# Patient Record
Sex: MALE | Race: BLACK OR AFRICAN AMERICAN | NOT HISPANIC OR LATINO
[De-identification: names, ages, dates, MRNs, and addresses within clinical notes are randomized per-mention and may not be internally consistent; named-entity substitution may affect disease eponyms.]

---

## 2017-01-09 ENCOUNTER — APPOINTMENT (OUTPATIENT)
Dept: PODIATRY | Facility: CLINIC | Age: 67
End: 2017-01-09

## 2017-02-27 ENCOUNTER — APPOINTMENT (OUTPATIENT)
Dept: PODIATRY | Facility: CLINIC | Age: 67
End: 2017-02-27

## 2017-05-05 ENCOUNTER — APPOINTMENT (OUTPATIENT)
Dept: GASTROENTEROLOGY | Facility: CLINIC | Age: 67
End: 2017-05-05

## 2017-05-08 ENCOUNTER — OUTPATIENT (OUTPATIENT)
Dept: OUTPATIENT SERVICES | Facility: HOSPITAL | Age: 67
LOS: 1 days | Discharge: HOME | End: 2017-05-08

## 2017-05-08 ENCOUNTER — APPOINTMENT (OUTPATIENT)
Dept: PODIATRY | Facility: CLINIC | Age: 67
End: 2017-05-08

## 2017-05-08 VITALS
HEIGHT: 71.5 IN | WEIGHT: 216 LBS | BODY MASS INDEX: 29.58 KG/M2 | SYSTOLIC BLOOD PRESSURE: 182 MMHG | HEART RATE: 88 BPM | DIASTOLIC BLOOD PRESSURE: 64 MMHG

## 2017-05-26 ENCOUNTER — APPOINTMENT (OUTPATIENT)
Dept: GASTROENTEROLOGY | Facility: CLINIC | Age: 67
End: 2017-05-26

## 2017-06-05 ENCOUNTER — APPOINTMENT (OUTPATIENT)
Dept: PODIATRY | Facility: CLINIC | Age: 67
End: 2017-06-05

## 2017-06-23 ENCOUNTER — APPOINTMENT (OUTPATIENT)
Dept: GASTROENTEROLOGY | Facility: CLINIC | Age: 67
End: 2017-06-23

## 2017-06-23 ENCOUNTER — OUTPATIENT (OUTPATIENT)
Dept: OUTPATIENT SERVICES | Facility: HOSPITAL | Age: 67
LOS: 1 days | Discharge: HOME | End: 2017-06-23

## 2017-06-23 DIAGNOSIS — R10.9 UNSPECIFIED ABDOMINAL PAIN: ICD-10-CM

## 2017-06-23 DIAGNOSIS — E11.9 TYPE 2 DIABETES MELLITUS WITHOUT COMPLICATIONS: ICD-10-CM

## 2017-06-23 DIAGNOSIS — E11.40 TYPE 2 DIABETES MELLITUS WITH DIABETIC NEUROPATHY, UNSPECIFIED: ICD-10-CM

## 2017-06-23 DIAGNOSIS — E78.5 HYPERLIPIDEMIA, UNSPECIFIED: ICD-10-CM

## 2017-06-23 DIAGNOSIS — E11.621 TYPE 2 DIABETES MELLITUS WITH FOOT ULCER: ICD-10-CM

## 2017-06-23 DIAGNOSIS — I10 ESSENTIAL (PRIMARY) HYPERTENSION: ICD-10-CM

## 2017-06-28 DIAGNOSIS — B35.1 TINEA UNGUIUM: ICD-10-CM

## 2017-06-28 DIAGNOSIS — M79.672 PAIN IN LEFT FOOT: ICD-10-CM

## 2017-06-28 DIAGNOSIS — E11.40 TYPE 2 DIABETES MELLITUS WITH DIABETIC NEUROPATHY, UNSPECIFIED: ICD-10-CM

## 2017-06-28 DIAGNOSIS — L84 CORNS AND CALLOSITIES: ICD-10-CM

## 2017-06-28 DIAGNOSIS — M79.671 PAIN IN RIGHT FOOT: ICD-10-CM

## 2017-07-03 ENCOUNTER — APPOINTMENT (OUTPATIENT)
Dept: PODIATRY | Facility: CLINIC | Age: 67
End: 2017-07-03

## 2017-07-03 ENCOUNTER — OUTPATIENT (OUTPATIENT)
Dept: OUTPATIENT SERVICES | Facility: HOSPITAL | Age: 67
LOS: 1 days | Discharge: HOME | End: 2017-07-03

## 2017-07-03 VITALS
SYSTOLIC BLOOD PRESSURE: 114 MMHG | WEIGHT: 216 LBS | HEART RATE: 81 BPM | DIASTOLIC BLOOD PRESSURE: 62 MMHG | HEIGHT: 71.5 IN | BODY MASS INDEX: 29.58 KG/M2

## 2017-07-03 DIAGNOSIS — E11.40 TYPE 2 DIABETES MELLITUS WITH DIABETIC NEUROPATHY, UNSPECIFIED: ICD-10-CM

## 2017-07-03 DIAGNOSIS — I10 ESSENTIAL (PRIMARY) HYPERTENSION: ICD-10-CM

## 2017-07-03 DIAGNOSIS — E11.9 TYPE 2 DIABETES MELLITUS WITHOUT COMPLICATIONS: ICD-10-CM

## 2017-07-03 DIAGNOSIS — E78.5 HYPERLIPIDEMIA, UNSPECIFIED: ICD-10-CM

## 2017-07-03 DIAGNOSIS — E11.621 TYPE 2 DIABETES MELLITUS WITH FOOT ULCER: ICD-10-CM

## 2017-07-11 DIAGNOSIS — L85.3 XEROSIS CUTIS: ICD-10-CM

## 2017-07-11 DIAGNOSIS — E11.40 TYPE 2 DIABETES MELLITUS WITH DIABETIC NEUROPATHY, UNSPECIFIED: ICD-10-CM

## 2017-07-11 DIAGNOSIS — M79.671 PAIN IN RIGHT FOOT: ICD-10-CM

## 2017-07-11 DIAGNOSIS — B35.1 TINEA UNGUIUM: ICD-10-CM

## 2017-07-11 DIAGNOSIS — M79.672 PAIN IN LEFT FOOT: ICD-10-CM

## 2017-09-15 ENCOUNTER — APPOINTMENT (OUTPATIENT)
Dept: ENDOCRINOLOGY | Facility: CLINIC | Age: 67
End: 2017-09-15

## 2017-09-18 ENCOUNTER — APPOINTMENT (OUTPATIENT)
Dept: PODIATRY | Facility: CLINIC | Age: 67
End: 2017-09-18

## 2017-09-18 ENCOUNTER — OUTPATIENT (OUTPATIENT)
Dept: OUTPATIENT SERVICES | Facility: HOSPITAL | Age: 67
LOS: 1 days | Discharge: HOME | End: 2017-09-18

## 2017-09-18 VITALS
SYSTOLIC BLOOD PRESSURE: 157 MMHG | HEIGHT: 71.5 IN | DIASTOLIC BLOOD PRESSURE: 79 MMHG | BODY MASS INDEX: 29.58 KG/M2 | HEART RATE: 76 BPM | WEIGHT: 216 LBS

## 2017-09-18 DIAGNOSIS — E11.40 TYPE 2 DIABETES MELLITUS WITH DIABETIC NEUROPATHY, UNSPECIFIED: ICD-10-CM

## 2017-09-18 DIAGNOSIS — E11.621 TYPE 2 DIABETES MELLITUS WITH FOOT ULCER: ICD-10-CM

## 2017-09-18 DIAGNOSIS — E78.5 HYPERLIPIDEMIA, UNSPECIFIED: ICD-10-CM

## 2017-09-18 DIAGNOSIS — E11.9 TYPE 2 DIABETES MELLITUS WITHOUT COMPLICATIONS: ICD-10-CM

## 2017-09-18 DIAGNOSIS — I10 ESSENTIAL (PRIMARY) HYPERTENSION: ICD-10-CM

## 2017-09-19 DIAGNOSIS — M79.671 PAIN IN RIGHT FOOT: ICD-10-CM

## 2017-09-19 DIAGNOSIS — B35.1 TINEA UNGUIUM: ICD-10-CM

## 2017-09-19 DIAGNOSIS — E11.40 TYPE 2 DIABETES MELLITUS WITH DIABETIC NEUROPATHY, UNSPECIFIED: ICD-10-CM

## 2017-09-19 DIAGNOSIS — L85.3 XEROSIS CUTIS: ICD-10-CM

## 2017-09-19 DIAGNOSIS — M79.672 PAIN IN LEFT FOOT: ICD-10-CM

## 2017-09-22 ENCOUNTER — OUTPATIENT (OUTPATIENT)
Dept: OUTPATIENT SERVICES | Facility: HOSPITAL | Age: 67
LOS: 1 days | Discharge: HOME | End: 2017-09-22

## 2017-09-22 ENCOUNTER — APPOINTMENT (OUTPATIENT)
Dept: GASTROENTEROLOGY | Facility: CLINIC | Age: 67
End: 2017-09-22

## 2017-09-22 VITALS
HEIGHT: 71.5 IN | WEIGHT: 216 LBS | DIASTOLIC BLOOD PRESSURE: 99 MMHG | BODY MASS INDEX: 29.58 KG/M2 | HEART RATE: 80 BPM | SYSTOLIC BLOOD PRESSURE: 192 MMHG

## 2017-09-22 DIAGNOSIS — E11.9 TYPE 2 DIABETES MELLITUS WITHOUT COMPLICATIONS: ICD-10-CM

## 2017-09-22 DIAGNOSIS — E11.621 TYPE 2 DIABETES MELLITUS WITH FOOT ULCER: ICD-10-CM

## 2017-09-22 DIAGNOSIS — E78.5 HYPERLIPIDEMIA, UNSPECIFIED: ICD-10-CM

## 2017-09-22 DIAGNOSIS — E11.40 TYPE 2 DIABETES MELLITUS WITH DIABETIC NEUROPATHY, UNSPECIFIED: ICD-10-CM

## 2017-09-22 DIAGNOSIS — I10 ESSENTIAL (PRIMARY) HYPERTENSION: ICD-10-CM

## 2017-10-16 ENCOUNTER — APPOINTMENT (OUTPATIENT)
Dept: PODIATRY | Facility: CLINIC | Age: 67
End: 2017-10-16

## 2017-10-26 ENCOUNTER — APPOINTMENT (OUTPATIENT)
Dept: PODIATRY | Facility: CLINIC | Age: 67
End: 2017-10-26

## 2017-10-26 ENCOUNTER — OUTPATIENT (OUTPATIENT)
Dept: OUTPATIENT SERVICES | Facility: HOSPITAL | Age: 67
LOS: 1 days | Discharge: HOME | End: 2017-10-26

## 2017-10-26 VITALS
HEART RATE: 74 BPM | BODY MASS INDEX: 30.24 KG/M2 | WEIGHT: 216 LBS | HEIGHT: 71 IN | DIASTOLIC BLOOD PRESSURE: 70 MMHG | SYSTOLIC BLOOD PRESSURE: 133 MMHG

## 2017-10-26 DIAGNOSIS — E11.621 TYPE 2 DIABETES MELLITUS WITH FOOT ULCER: ICD-10-CM

## 2017-10-26 DIAGNOSIS — E78.5 HYPERLIPIDEMIA, UNSPECIFIED: ICD-10-CM

## 2017-10-26 DIAGNOSIS — E11.40 TYPE 2 DIABETES MELLITUS WITH DIABETIC NEUROPATHY, UNSPECIFIED: ICD-10-CM

## 2017-10-26 DIAGNOSIS — I10 ESSENTIAL (PRIMARY) HYPERTENSION: ICD-10-CM

## 2017-10-26 DIAGNOSIS — E11.9 TYPE 2 DIABETES MELLITUS WITHOUT COMPLICATIONS: ICD-10-CM

## 2017-10-27 DIAGNOSIS — L85.3 XEROSIS CUTIS: ICD-10-CM

## 2017-10-27 DIAGNOSIS — E11.40 TYPE 2 DIABETES MELLITUS WITH DIABETIC NEUROPATHY, UNSPECIFIED: ICD-10-CM

## 2017-10-27 DIAGNOSIS — M79.672 PAIN IN LEFT FOOT: ICD-10-CM

## 2017-10-27 DIAGNOSIS — M79.671 PAIN IN RIGHT FOOT: ICD-10-CM

## 2017-10-27 DIAGNOSIS — B35.1 TINEA UNGUIUM: ICD-10-CM

## 2017-10-27 DIAGNOSIS — S91.209A UNSPECIFIED OPEN WOUND OF UNSPECIFIED TOE(S) WITH DAMAGE TO NAIL, INITIAL ENCOUNTER: ICD-10-CM

## 2017-11-09 ENCOUNTER — OUTPATIENT (OUTPATIENT)
Dept: OUTPATIENT SERVICES | Facility: HOSPITAL | Age: 67
LOS: 1 days | Discharge: HOME | End: 2017-11-09

## 2017-11-09 ENCOUNTER — APPOINTMENT (OUTPATIENT)
Dept: PODIATRY | Facility: CLINIC | Age: 67
End: 2017-11-09

## 2017-11-09 VITALS
BODY MASS INDEX: 22.4 KG/M2 | HEART RATE: 70 BPM | HEIGHT: 71 IN | WEIGHT: 160 LBS | SYSTOLIC BLOOD PRESSURE: 107 MMHG | DIASTOLIC BLOOD PRESSURE: 67 MMHG

## 2017-11-09 DIAGNOSIS — E78.5 HYPERLIPIDEMIA, UNSPECIFIED: ICD-10-CM

## 2017-11-09 DIAGNOSIS — I10 ESSENTIAL (PRIMARY) HYPERTENSION: ICD-10-CM

## 2017-11-09 DIAGNOSIS — E11.9 TYPE 2 DIABETES MELLITUS WITHOUT COMPLICATIONS: ICD-10-CM

## 2017-11-09 DIAGNOSIS — E11.40 TYPE 2 DIABETES MELLITUS WITH DIABETIC NEUROPATHY, UNSPECIFIED: ICD-10-CM

## 2017-11-09 DIAGNOSIS — E11.621 TYPE 2 DIABETES MELLITUS WITH FOOT ULCER: ICD-10-CM

## 2017-11-10 DIAGNOSIS — E11.40 TYPE 2 DIABETES MELLITUS WITH DIABETIC NEUROPATHY, UNSPECIFIED: ICD-10-CM

## 2017-11-10 DIAGNOSIS — S91.209D: ICD-10-CM

## 2017-11-10 DIAGNOSIS — M79.671 PAIN IN RIGHT FOOT: ICD-10-CM

## 2017-11-16 ENCOUNTER — APPOINTMENT (OUTPATIENT)
Dept: PODIATRY | Facility: CLINIC | Age: 67
End: 2017-11-16

## 2017-11-16 ENCOUNTER — OUTPATIENT (OUTPATIENT)
Dept: OUTPATIENT SERVICES | Facility: HOSPITAL | Age: 67
LOS: 1 days | Discharge: HOME | End: 2017-11-16

## 2017-11-16 VITALS
SYSTOLIC BLOOD PRESSURE: 198 MMHG | DIASTOLIC BLOOD PRESSURE: 97 MMHG | WEIGHT: 216 LBS | HEART RATE: 75 BPM | BODY MASS INDEX: 30.24 KG/M2 | HEIGHT: 71 IN

## 2017-11-16 DIAGNOSIS — E11.621 TYPE 2 DIABETES MELLITUS WITH FOOT ULCER: ICD-10-CM

## 2017-11-16 DIAGNOSIS — I10 ESSENTIAL (PRIMARY) HYPERTENSION: ICD-10-CM

## 2017-11-16 DIAGNOSIS — E78.5 HYPERLIPIDEMIA, UNSPECIFIED: ICD-10-CM

## 2017-11-16 DIAGNOSIS — E11.9 TYPE 2 DIABETES MELLITUS WITHOUT COMPLICATIONS: ICD-10-CM

## 2017-11-16 DIAGNOSIS — E11.40 TYPE 2 DIABETES MELLITUS WITH DIABETIC NEUROPATHY, UNSPECIFIED: ICD-10-CM

## 2017-11-20 DIAGNOSIS — E11.40 TYPE 2 DIABETES MELLITUS WITH DIABETIC NEUROPATHY, UNSPECIFIED: ICD-10-CM

## 2017-11-20 DIAGNOSIS — S91.209D: ICD-10-CM

## 2017-11-20 DIAGNOSIS — M79.671 PAIN IN RIGHT FOOT: ICD-10-CM

## 2017-11-30 ENCOUNTER — OTHER (OUTPATIENT)
Age: 67
End: 2017-11-30

## 2017-11-30 ENCOUNTER — APPOINTMENT (OUTPATIENT)
Dept: PODIATRY | Facility: CLINIC | Age: 67
End: 2017-11-30

## 2017-11-30 ENCOUNTER — OUTPATIENT (OUTPATIENT)
Dept: OUTPATIENT SERVICES | Facility: HOSPITAL | Age: 67
LOS: 1 days | Discharge: HOME | End: 2017-11-30

## 2017-11-30 VITALS
HEART RATE: 76 BPM | WEIGHT: 216 LBS | SYSTOLIC BLOOD PRESSURE: 163 MMHG | BODY MASS INDEX: 30.24 KG/M2 | DIASTOLIC BLOOD PRESSURE: 84 MMHG | HEIGHT: 71 IN

## 2017-11-30 DIAGNOSIS — S91.209D UNSPECIFIED OPEN WOUND OF UNSPECIFIED TOE(S) WITH DAMAGE TO NAIL, SUBSEQUENT ENCOUNTER: ICD-10-CM

## 2017-11-30 DIAGNOSIS — E11.621 TYPE 2 DIABETES MELLITUS WITH FOOT ULCER: ICD-10-CM

## 2017-11-30 DIAGNOSIS — I10 ESSENTIAL (PRIMARY) HYPERTENSION: ICD-10-CM

## 2017-11-30 DIAGNOSIS — E11.40 TYPE 2 DIABETES MELLITUS WITH DIABETIC NEUROPATHY, UNSPECIFIED: ICD-10-CM

## 2017-11-30 DIAGNOSIS — E78.5 HYPERLIPIDEMIA, UNSPECIFIED: ICD-10-CM

## 2017-11-30 DIAGNOSIS — E11.9 TYPE 2 DIABETES MELLITUS WITHOUT COMPLICATIONS: ICD-10-CM

## 2017-12-08 ENCOUNTER — EMERGENCY (EMERGENCY)
Facility: HOSPITAL | Age: 67
LOS: 0 days | Discharge: HOME | End: 2017-12-08
Admitting: INTERNAL MEDICINE

## 2017-12-08 DIAGNOSIS — E11.9 TYPE 2 DIABETES MELLITUS WITHOUT COMPLICATIONS: ICD-10-CM

## 2017-12-08 DIAGNOSIS — R51 HEADACHE: ICD-10-CM

## 2017-12-08 DIAGNOSIS — E11.621 TYPE 2 DIABETES MELLITUS WITH FOOT ULCER: ICD-10-CM

## 2017-12-08 DIAGNOSIS — I10 ESSENTIAL (PRIMARY) HYPERTENSION: ICD-10-CM

## 2017-12-08 DIAGNOSIS — Z79.899 OTHER LONG TERM (CURRENT) DRUG THERAPY: ICD-10-CM

## 2017-12-08 DIAGNOSIS — E11.40 TYPE 2 DIABETES MELLITUS WITH DIABETIC NEUROPATHY, UNSPECIFIED: ICD-10-CM

## 2017-12-08 DIAGNOSIS — E78.5 HYPERLIPIDEMIA, UNSPECIFIED: ICD-10-CM

## 2017-12-08 DIAGNOSIS — Z79.4 LONG TERM (CURRENT) USE OF INSULIN: ICD-10-CM

## 2017-12-29 ENCOUNTER — APPOINTMENT (OUTPATIENT)
Dept: GASTROENTEROLOGY | Facility: CLINIC | Age: 67
End: 2017-12-29

## 2017-12-29 ENCOUNTER — OUTPATIENT (OUTPATIENT)
Dept: OUTPATIENT SERVICES | Facility: HOSPITAL | Age: 67
LOS: 1 days | Discharge: HOME | End: 2017-12-29

## 2017-12-29 VITALS
SYSTOLIC BLOOD PRESSURE: 174 MMHG | BODY MASS INDEX: 30.1 KG/M2 | DIASTOLIC BLOOD PRESSURE: 90 MMHG | HEART RATE: 73 BPM | HEIGHT: 71 IN | WEIGHT: 215 LBS

## 2017-12-29 DIAGNOSIS — I10 ESSENTIAL (PRIMARY) HYPERTENSION: ICD-10-CM

## 2017-12-29 DIAGNOSIS — E11.40 TYPE 2 DIABETES MELLITUS WITH DIABETIC NEUROPATHY, UNSPECIFIED: ICD-10-CM

## 2017-12-29 DIAGNOSIS — E11.621 TYPE 2 DIABETES MELLITUS WITH FOOT ULCER: ICD-10-CM

## 2017-12-29 DIAGNOSIS — E11.9 TYPE 2 DIABETES MELLITUS WITHOUT COMPLICATIONS: ICD-10-CM

## 2017-12-29 DIAGNOSIS — E78.5 HYPERLIPIDEMIA, UNSPECIFIED: ICD-10-CM

## 2017-12-29 RX ORDER — OMEPRAZOLE 20 MG/1
20 TABLET, DELAYED RELEASE ORAL
Qty: 120 | Refills: 0 | Status: DISCONTINUED | COMMUNITY
Start: 2017-09-22 | End: 2017-12-29

## 2017-12-29 RX ORDER — OMEPRAZOLE 20 MG/1
20 CAPSULE, DELAYED RELEASE ORAL
Qty: 90 | Refills: 0 | Status: DISCONTINUED | COMMUNITY
Start: 2017-04-01 | End: 2017-12-29

## 2018-01-04 ENCOUNTER — APPOINTMENT (OUTPATIENT)
Dept: PODIATRY | Facility: CLINIC | Age: 68
End: 2018-01-04

## 2018-01-18 ENCOUNTER — APPOINTMENT (OUTPATIENT)
Dept: PODIATRY | Facility: CLINIC | Age: 68
End: 2018-01-18

## 2018-01-18 ENCOUNTER — OUTPATIENT (OUTPATIENT)
Dept: OUTPATIENT SERVICES | Facility: HOSPITAL | Age: 68
LOS: 1 days | Discharge: HOME | End: 2018-01-18

## 2018-01-18 VITALS
BODY MASS INDEX: 30.1 KG/M2 | HEIGHT: 71 IN | HEART RATE: 72 BPM | SYSTOLIC BLOOD PRESSURE: 159 MMHG | DIASTOLIC BLOOD PRESSURE: 78 MMHG | WEIGHT: 215 LBS

## 2018-01-18 DIAGNOSIS — E11.621 TYPE 2 DIABETES MELLITUS WITH FOOT ULCER: ICD-10-CM

## 2018-01-18 DIAGNOSIS — I10 ESSENTIAL (PRIMARY) HYPERTENSION: ICD-10-CM

## 2018-01-18 DIAGNOSIS — E78.5 HYPERLIPIDEMIA, UNSPECIFIED: ICD-10-CM

## 2018-01-18 DIAGNOSIS — E11.40 TYPE 2 DIABETES MELLITUS WITH DIABETIC NEUROPATHY, UNSPECIFIED: ICD-10-CM

## 2018-01-18 DIAGNOSIS — E11.9 TYPE 2 DIABETES MELLITUS WITHOUT COMPLICATIONS: ICD-10-CM

## 2018-01-26 DIAGNOSIS — M79.672 PAIN IN LEFT FOOT: ICD-10-CM

## 2018-01-26 DIAGNOSIS — E11.40 TYPE 2 DIABETES MELLITUS WITH DIABETIC NEUROPATHY, UNSPECIFIED: ICD-10-CM

## 2018-01-26 DIAGNOSIS — M79.671 PAIN IN RIGHT FOOT: ICD-10-CM

## 2018-01-26 DIAGNOSIS — B35.1 TINEA UNGUIUM: ICD-10-CM

## 2018-01-26 DIAGNOSIS — L85.3 XEROSIS CUTIS: ICD-10-CM

## 2018-02-22 ENCOUNTER — APPOINTMENT (OUTPATIENT)
Dept: PODIATRY | Facility: CLINIC | Age: 68
End: 2018-02-22
Payer: MEDICARE

## 2018-02-22 ENCOUNTER — OUTPATIENT (OUTPATIENT)
Dept: OUTPATIENT SERVICES | Facility: HOSPITAL | Age: 68
LOS: 1 days | Discharge: HOME | End: 2018-02-22

## 2018-02-22 VITALS
WEIGHT: 216 LBS | HEIGHT: 71 IN | HEART RATE: 84 BPM | BODY MASS INDEX: 30.24 KG/M2 | SYSTOLIC BLOOD PRESSURE: 186 MMHG | DIASTOLIC BLOOD PRESSURE: 74 MMHG

## 2018-02-22 PROCEDURE — ZZZZZ: CPT

## 2018-02-28 DIAGNOSIS — M79.671 PAIN IN RIGHT FOOT: ICD-10-CM

## 2018-02-28 DIAGNOSIS — B35.1 TINEA UNGUIUM: ICD-10-CM

## 2018-02-28 DIAGNOSIS — L85.3 XEROSIS CUTIS: ICD-10-CM

## 2018-02-28 DIAGNOSIS — M79.672 PAIN IN LEFT FOOT: ICD-10-CM

## 2018-02-28 DIAGNOSIS — E11.40 TYPE 2 DIABETES MELLITUS WITH DIABETIC NEUROPATHY, UNSPECIFIED: ICD-10-CM

## 2018-03-22 ENCOUNTER — OUTPATIENT (OUTPATIENT)
Dept: OUTPATIENT SERVICES | Facility: HOSPITAL | Age: 68
LOS: 1 days | Discharge: HOME | End: 2018-03-22

## 2018-03-22 ENCOUNTER — APPOINTMENT (OUTPATIENT)
Dept: PODIATRY | Facility: CLINIC | Age: 68
End: 2018-03-22
Payer: MEDICARE

## 2018-03-22 VITALS
HEIGHT: 71 IN | BODY MASS INDEX: 30.24 KG/M2 | DIASTOLIC BLOOD PRESSURE: 81 MMHG | WEIGHT: 216 LBS | HEART RATE: 83 BPM | SYSTOLIC BLOOD PRESSURE: 137 MMHG

## 2018-03-22 PROCEDURE — 11721 DEBRIDE NAIL 6 OR MORE: CPT

## 2018-04-02 DIAGNOSIS — B35.1 TINEA UNGUIUM: ICD-10-CM

## 2018-04-02 DIAGNOSIS — E11.42 TYPE 2 DIABETES MELLITUS WITH DIABETIC POLYNEUROPATHY: ICD-10-CM

## 2018-04-02 DIAGNOSIS — L85.3 XEROSIS CUTIS: ICD-10-CM

## 2018-04-26 ENCOUNTER — APPOINTMENT (OUTPATIENT)
Dept: PODIATRY | Facility: CLINIC | Age: 68
End: 2018-04-26
Payer: MEDICARE

## 2018-04-26 ENCOUNTER — OUTPATIENT (OUTPATIENT)
Dept: OUTPATIENT SERVICES | Facility: HOSPITAL | Age: 68
LOS: 1 days | Discharge: HOME | End: 2018-04-26

## 2018-04-26 VITALS — SYSTOLIC BLOOD PRESSURE: 177 MMHG | DIASTOLIC BLOOD PRESSURE: 84 MMHG | HEART RATE: 72 BPM

## 2018-04-26 VITALS — BODY MASS INDEX: 30.24 KG/M2 | WEIGHT: 216 LBS | HEIGHT: 71 IN

## 2018-04-26 PROCEDURE — 99212 OFFICE O/P EST SF 10 MIN: CPT

## 2018-04-26 PROCEDURE — 11055 PARING/CUTG B9 HYPRKER LES 1: CPT

## 2018-04-26 PROCEDURE — 11721 DEBRIDE NAIL 6 OR MORE: CPT | Mod: 59

## 2018-05-03 DIAGNOSIS — B35.1 TINEA UNGUIUM: ICD-10-CM

## 2018-05-03 DIAGNOSIS — E11.42 TYPE 2 DIABETES MELLITUS WITH DIABETIC POLYNEUROPATHY: ICD-10-CM

## 2018-05-03 DIAGNOSIS — L97.426 NON-PRESSURE CHRONIC ULCER OF LEFT HEEL AND MIDFOOT WITH BONE INVOLVEMENT WITHOUT EVIDENCE OF NECROSIS: ICD-10-CM

## 2018-05-17 ENCOUNTER — OUTPATIENT (OUTPATIENT)
Dept: OUTPATIENT SERVICES | Facility: HOSPITAL | Age: 68
LOS: 1 days | Discharge: HOME | End: 2018-05-17

## 2018-05-17 ENCOUNTER — APPOINTMENT (OUTPATIENT)
Dept: PODIATRY | Facility: CLINIC | Age: 68
End: 2018-05-17
Payer: MEDICARE

## 2018-05-17 VITALS
HEIGHT: 71 IN | SYSTOLIC BLOOD PRESSURE: 139 MMHG | BODY MASS INDEX: 30.24 KG/M2 | DIASTOLIC BLOOD PRESSURE: 78 MMHG | WEIGHT: 216 LBS

## 2018-05-17 DIAGNOSIS — S91.209A UNSPECIFIED OPEN WOUND OF UNSPECIFIED TOE(S) WITH DAMAGE TO NAIL, INITIAL ENCOUNTER: ICD-10-CM

## 2018-05-17 PROCEDURE — 99213 OFFICE O/P EST LOW 20 MIN: CPT | Mod: 25

## 2018-05-17 PROCEDURE — 11042 DBRDMT SUBQ TIS 1ST 20SQCM/<: CPT

## 2018-05-17 PROCEDURE — 11730 AVULSION NAIL PLATE SIMPLE 1: CPT

## 2018-05-24 ENCOUNTER — APPOINTMENT (OUTPATIENT)
Dept: PODIATRY | Facility: CLINIC | Age: 68
End: 2018-05-24
Payer: MEDICARE

## 2018-05-24 ENCOUNTER — OUTPATIENT (OUTPATIENT)
Dept: OUTPATIENT SERVICES | Facility: HOSPITAL | Age: 68
LOS: 1 days | Discharge: HOME | End: 2018-05-24

## 2018-05-24 VITALS
SYSTOLIC BLOOD PRESSURE: 131 MMHG | DIASTOLIC BLOOD PRESSURE: 69 MMHG | WEIGHT: 216 LBS | HEART RATE: 101 BPM | BODY MASS INDEX: 30.24 KG/M2 | HEIGHT: 71 IN

## 2018-05-24 PROCEDURE — 11042 DBRDMT SUBQ TIS 1ST 20SQCM/<: CPT

## 2018-05-24 PROCEDURE — 99212 OFFICE O/P EST SF 10 MIN: CPT | Mod: 25

## 2018-05-27 PROBLEM — S91.209A AVULSION OF TOENAIL, INITIAL ENCOUNTER: Status: ACTIVE | Noted: 2017-10-26

## 2018-05-30 DIAGNOSIS — S91.209A UNSPECIFIED OPEN WOUND OF UNSPECIFIED TOE(S) WITH DAMAGE TO NAIL, INITIAL ENCOUNTER: ICD-10-CM

## 2018-05-30 DIAGNOSIS — L60.0 INGROWING NAIL: ICD-10-CM

## 2018-05-30 DIAGNOSIS — L97.519 NON-PRESSURE CHRONIC ULCER OF OTHER PART OF RIGHT FOOT WITH UNSPECIFIED SEVERITY: ICD-10-CM

## 2018-05-30 DIAGNOSIS — E11.42 TYPE 2 DIABETES MELLITUS WITH DIABETIC POLYNEUROPATHY: ICD-10-CM

## 2018-05-31 ENCOUNTER — APPOINTMENT (OUTPATIENT)
Dept: PODIATRY | Facility: CLINIC | Age: 68
End: 2018-05-31
Payer: MEDICARE

## 2018-05-31 ENCOUNTER — OUTPATIENT (OUTPATIENT)
Dept: OUTPATIENT SERVICES | Facility: HOSPITAL | Age: 68
LOS: 1 days | Discharge: HOME | End: 2018-05-31

## 2018-05-31 VITALS
HEIGHT: 71 IN | HEART RATE: 83 BPM | WEIGHT: 216 LBS | DIASTOLIC BLOOD PRESSURE: 81 MMHG | BODY MASS INDEX: 30.24 KG/M2 | SYSTOLIC BLOOD PRESSURE: 185 MMHG

## 2018-05-31 PROCEDURE — 11042 DBRDMT SUBQ TIS 1ST 20SQCM/<: CPT

## 2018-06-01 ENCOUNTER — APPOINTMENT (OUTPATIENT)
Dept: GASTROENTEROLOGY | Facility: CLINIC | Age: 68
End: 2018-06-01

## 2018-06-05 DIAGNOSIS — M79.672 PAIN IN LEFT FOOT: ICD-10-CM

## 2018-06-05 DIAGNOSIS — E11.42 TYPE 2 DIABETES MELLITUS WITH DIABETIC POLYNEUROPATHY: ICD-10-CM

## 2018-06-05 DIAGNOSIS — E11.621 TYPE 2 DIABETES MELLITUS WITH FOOT ULCER: ICD-10-CM

## 2018-06-07 ENCOUNTER — OUTPATIENT (OUTPATIENT)
Dept: OUTPATIENT SERVICES | Facility: HOSPITAL | Age: 68
LOS: 1 days | Discharge: HOME | End: 2018-06-07

## 2018-06-07 ENCOUNTER — APPOINTMENT (OUTPATIENT)
Dept: PODIATRY | Facility: CLINIC | Age: 68
End: 2018-06-07
Payer: MEDICARE

## 2018-06-07 VITALS
DIASTOLIC BLOOD PRESSURE: 80 MMHG | BODY MASS INDEX: 30.1 KG/M2 | SYSTOLIC BLOOD PRESSURE: 160 MMHG | WEIGHT: 215 LBS | HEIGHT: 71 IN

## 2018-06-07 PROCEDURE — 11042 DBRDMT SUBQ TIS 1ST 20SQCM/<: CPT

## 2018-06-14 ENCOUNTER — OUTPATIENT (OUTPATIENT)
Dept: OUTPATIENT SERVICES | Facility: HOSPITAL | Age: 68
LOS: 1 days | Discharge: HOME | End: 2018-06-14

## 2018-06-14 ENCOUNTER — APPOINTMENT (OUTPATIENT)
Dept: PODIATRY | Facility: CLINIC | Age: 68
End: 2018-06-14
Payer: MEDICARE

## 2018-06-14 VITALS
HEART RATE: 79 BPM | BODY MASS INDEX: 29.96 KG/M2 | WEIGHT: 214 LBS | SYSTOLIC BLOOD PRESSURE: 112 MMHG | DIASTOLIC BLOOD PRESSURE: 68 MMHG | HEIGHT: 71 IN

## 2018-06-14 PROCEDURE — 99212 OFFICE O/P EST SF 10 MIN: CPT | Mod: 25

## 2018-06-14 PROCEDURE — 11042 DBRDMT SUBQ TIS 1ST 20SQCM/<: CPT

## 2018-06-21 ENCOUNTER — APPOINTMENT (OUTPATIENT)
Dept: PODIATRY | Facility: CLINIC | Age: 68
End: 2018-06-21
Payer: MEDICARE

## 2018-06-21 ENCOUNTER — OUTPATIENT (OUTPATIENT)
Dept: OUTPATIENT SERVICES | Facility: HOSPITAL | Age: 68
LOS: 1 days | Discharge: HOME | End: 2018-06-21

## 2018-06-21 VITALS
HEIGHT: 71 IN | DIASTOLIC BLOOD PRESSURE: 75 MMHG | SYSTOLIC BLOOD PRESSURE: 129 MMHG | HEART RATE: 70 BPM | WEIGHT: 214 LBS | BODY MASS INDEX: 29.96 KG/M2

## 2018-06-21 PROCEDURE — 99212 OFFICE O/P EST SF 10 MIN: CPT

## 2018-06-28 ENCOUNTER — OUTPATIENT (OUTPATIENT)
Dept: OUTPATIENT SERVICES | Facility: HOSPITAL | Age: 68
LOS: 1 days | Discharge: HOME | End: 2018-06-28

## 2018-06-28 ENCOUNTER — APPOINTMENT (OUTPATIENT)
Dept: PODIATRY | Facility: CLINIC | Age: 68
End: 2018-06-28
Payer: MEDICARE

## 2018-06-28 VITALS
HEART RATE: 74 BPM | SYSTOLIC BLOOD PRESSURE: 150 MMHG | DIASTOLIC BLOOD PRESSURE: 75 MMHG | BODY MASS INDEX: 29.96 KG/M2 | HEIGHT: 71 IN | WEIGHT: 214 LBS

## 2018-06-28 PROCEDURE — 97597 DBRDMT OPN WND 1ST 20 CM/<: CPT

## 2018-06-29 DIAGNOSIS — E11.42 TYPE 2 DIABETES MELLITUS WITH DIABETIC POLYNEUROPATHY: ICD-10-CM

## 2018-06-29 DIAGNOSIS — E11.621 TYPE 2 DIABETES MELLITUS WITH FOOT ULCER: ICD-10-CM

## 2018-07-05 ENCOUNTER — APPOINTMENT (OUTPATIENT)
Dept: PODIATRY | Facility: CLINIC | Age: 68
End: 2018-07-05
Payer: MEDICARE

## 2018-07-05 ENCOUNTER — OUTPATIENT (OUTPATIENT)
Dept: OUTPATIENT SERVICES | Facility: HOSPITAL | Age: 68
LOS: 1 days | Discharge: HOME | End: 2018-07-05

## 2018-07-05 VITALS
BODY MASS INDEX: 29.96 KG/M2 | HEART RATE: 78 BPM | DIASTOLIC BLOOD PRESSURE: 72 MMHG | WEIGHT: 214 LBS | SYSTOLIC BLOOD PRESSURE: 138 MMHG | HEIGHT: 71 IN

## 2018-07-05 PROCEDURE — 97597 DBRDMT OPN WND 1ST 20 CM/<: CPT

## 2018-07-12 ENCOUNTER — OUTPATIENT (OUTPATIENT)
Dept: OUTPATIENT SERVICES | Facility: HOSPITAL | Age: 68
LOS: 1 days | Discharge: HOME | End: 2018-07-12

## 2018-07-12 ENCOUNTER — APPOINTMENT (OUTPATIENT)
Dept: PODIATRY | Facility: CLINIC | Age: 68
End: 2018-07-12
Payer: MEDICARE

## 2018-07-12 VITALS
HEART RATE: 67 BPM | BODY MASS INDEX: 28.7 KG/M2 | DIASTOLIC BLOOD PRESSURE: 78 MMHG | WEIGHT: 205 LBS | SYSTOLIC BLOOD PRESSURE: 148 MMHG | HEIGHT: 71 IN

## 2018-07-12 PROCEDURE — 99213 OFFICE O/P EST LOW 20 MIN: CPT | Mod: 25

## 2018-07-12 PROCEDURE — 11042 DBRDMT SUBQ TIS 1ST 20SQCM/<: CPT

## 2018-07-17 DIAGNOSIS — L97.519 NON-PRESSURE CHRONIC ULCER OF OTHER PART OF RIGHT FOOT WITH UNSPECIFIED SEVERITY: ICD-10-CM

## 2018-07-17 DIAGNOSIS — E11.621 TYPE 2 DIABETES MELLITUS WITH FOOT ULCER: ICD-10-CM

## 2018-07-17 DIAGNOSIS — L89.892 PRESSURE ULCER OF OTHER SITE, STAGE 2: ICD-10-CM

## 2018-07-17 DIAGNOSIS — E11.42 TYPE 2 DIABETES MELLITUS WITH DIABETIC POLYNEUROPATHY: ICD-10-CM

## 2018-07-19 ENCOUNTER — APPOINTMENT (OUTPATIENT)
Dept: PODIATRY | Facility: CLINIC | Age: 68
End: 2018-07-19
Payer: MEDICARE

## 2018-07-19 ENCOUNTER — OUTPATIENT (OUTPATIENT)
Dept: OUTPATIENT SERVICES | Facility: HOSPITAL | Age: 68
LOS: 1 days | Discharge: HOME | End: 2018-07-19

## 2018-07-19 VITALS
SYSTOLIC BLOOD PRESSURE: 151 MMHG | DIASTOLIC BLOOD PRESSURE: 79 MMHG | WEIGHT: 205 LBS | HEIGHT: 71 IN | HEART RATE: 76 BPM | BODY MASS INDEX: 28.7 KG/M2

## 2018-07-19 DIAGNOSIS — M79.672 PAIN IN LEFT FOOT: ICD-10-CM

## 2018-07-19 DIAGNOSIS — E11.621 TYPE 2 DIABETES MELLITUS WITH FOOT ULCER: ICD-10-CM

## 2018-07-19 PROCEDURE — 97597 DBRDMT OPN WND 1ST 20 CM/<: CPT

## 2018-07-26 ENCOUNTER — OUTPATIENT (OUTPATIENT)
Dept: OUTPATIENT SERVICES | Facility: HOSPITAL | Age: 68
LOS: 1 days | Discharge: HOME | End: 2018-07-26

## 2018-07-26 ENCOUNTER — APPOINTMENT (OUTPATIENT)
Dept: PODIATRY | Facility: CLINIC | Age: 68
End: 2018-07-26
Payer: MEDICARE

## 2018-07-26 VITALS
BODY MASS INDEX: 28.7 KG/M2 | SYSTOLIC BLOOD PRESSURE: 125 MMHG | WEIGHT: 205 LBS | DIASTOLIC BLOOD PRESSURE: 75 MMHG | HEIGHT: 71 IN | HEART RATE: 74 BPM

## 2018-07-26 DIAGNOSIS — M79.672 PAIN IN LEFT FOOT: ICD-10-CM

## 2018-07-26 DIAGNOSIS — Z02.9 ENCOUNTER FOR ADMINISTRATIVE EXAMINATIONS, UNSPECIFIED: ICD-10-CM

## 2018-07-26 DIAGNOSIS — E11.621 TYPE 2 DIABETES MELLITUS WITH FOOT ULCER: ICD-10-CM

## 2018-07-26 DIAGNOSIS — L89.892 PRESSURE ULCER OF OTHER SITE, STAGE 2: ICD-10-CM

## 2018-07-26 DIAGNOSIS — E11.42 TYPE 2 DIABETES MELLITUS WITH DIABETIC POLYNEUROPATHY: ICD-10-CM

## 2018-07-26 DIAGNOSIS — M79.671 PAIN IN RIGHT FOOT: ICD-10-CM

## 2018-07-26 PROCEDURE — 99213 OFFICE O/P EST LOW 20 MIN: CPT

## 2018-08-06 ENCOUNTER — APPOINTMENT (OUTPATIENT)
Dept: PODIATRY | Facility: CLINIC | Age: 68
End: 2018-08-06

## 2018-08-07 ENCOUNTER — OUTPATIENT (OUTPATIENT)
Dept: OUTPATIENT SERVICES | Facility: HOSPITAL | Age: 68
LOS: 1 days | Discharge: HOME | End: 2018-08-07

## 2018-08-07 ENCOUNTER — APPOINTMENT (OUTPATIENT)
Dept: PODIATRY | Facility: CLINIC | Age: 68
End: 2018-08-07
Payer: MEDICARE

## 2018-08-07 VITALS
BODY MASS INDEX: 28.7 KG/M2 | SYSTOLIC BLOOD PRESSURE: 141 MMHG | WEIGHT: 205 LBS | DIASTOLIC BLOOD PRESSURE: 74 MMHG | HEART RATE: 75 BPM | HEIGHT: 71 IN

## 2018-08-07 PROCEDURE — 99213 OFFICE O/P EST LOW 20 MIN: CPT

## 2018-08-10 DIAGNOSIS — M79.672 PAIN IN LEFT FOOT: ICD-10-CM

## 2018-08-10 DIAGNOSIS — E11.621 TYPE 2 DIABETES MELLITUS WITH FOOT ULCER: ICD-10-CM

## 2018-09-06 ENCOUNTER — OUTPATIENT (OUTPATIENT)
Dept: OUTPATIENT SERVICES | Facility: HOSPITAL | Age: 68
LOS: 1 days | Discharge: HOME | End: 2018-09-06

## 2018-09-06 ENCOUNTER — APPOINTMENT (OUTPATIENT)
Dept: PODIATRY | Facility: CLINIC | Age: 68
End: 2018-09-06
Payer: MEDICARE

## 2018-09-06 VITALS
BODY MASS INDEX: 28.7 KG/M2 | HEART RATE: 73 BPM | DIASTOLIC BLOOD PRESSURE: 50 MMHG | SYSTOLIC BLOOD PRESSURE: 150 MMHG | HEIGHT: 71 IN | WEIGHT: 205 LBS

## 2018-09-06 DIAGNOSIS — B35.1 TINEA UNGUIUM: ICD-10-CM

## 2018-09-06 DIAGNOSIS — M79.672 PAIN IN LEFT FOOT: ICD-10-CM

## 2018-09-06 DIAGNOSIS — M79.671 PAIN IN RIGHT FOOT: ICD-10-CM

## 2018-09-06 PROCEDURE — 11055 PARING/CUTG B9 HYPRKER LES 1: CPT

## 2018-09-12 DIAGNOSIS — Z89.419 ACQUIRED ABSENCE OF UNSPECIFIED GREAT TOE: ICD-10-CM

## 2018-09-12 DIAGNOSIS — E11.40 TYPE 2 DIABETES MELLITUS WITH DIABETIC NEUROPATHY, UNSPECIFIED: ICD-10-CM

## 2018-09-12 DIAGNOSIS — L85.1 ACQUIRED KERATOSIS [KERATODERMA] PALMARIS ET PLANTARIS: ICD-10-CM

## 2018-09-24 ENCOUNTER — EMERGENCY (EMERGENCY)
Facility: HOSPITAL | Age: 68
LOS: 0 days | Discharge: HOME | End: 2018-09-24
Attending: EMERGENCY MEDICINE | Admitting: EMERGENCY MEDICINE

## 2018-09-24 VITALS
HEIGHT: 71 IN | RESPIRATION RATE: 18 BRPM | TEMPERATURE: 98 F | SYSTOLIC BLOOD PRESSURE: 196 MMHG | OXYGEN SATURATION: 98 % | WEIGHT: 216.05 LBS | DIASTOLIC BLOOD PRESSURE: 91 MMHG | HEART RATE: 83 BPM

## 2018-09-24 VITALS
DIASTOLIC BLOOD PRESSURE: 78 MMHG | OXYGEN SATURATION: 98 % | HEART RATE: 86 BPM | SYSTOLIC BLOOD PRESSURE: 138 MMHG | RESPIRATION RATE: 18 BRPM

## 2018-09-24 DIAGNOSIS — I12.9 HYPERTENSIVE CHRONIC KIDNEY DISEASE WITH STAGE 1 THROUGH STAGE 4 CHRONIC KIDNEY DISEASE, OR UNSPECIFIED CHRONIC KIDNEY DISEASE: ICD-10-CM

## 2018-09-24 DIAGNOSIS — N18.9 CHRONIC KIDNEY DISEASE, UNSPECIFIED: ICD-10-CM

## 2018-09-24 DIAGNOSIS — R79.9 ABNORMAL FINDING OF BLOOD CHEMISTRY, UNSPECIFIED: ICD-10-CM

## 2018-09-24 DIAGNOSIS — E11.65 TYPE 2 DIABETES MELLITUS WITH HYPERGLYCEMIA: ICD-10-CM

## 2018-09-24 LAB
ALBUMIN SERPL ELPH-MCNC: 4.6 G/DL — SIGNIFICANT CHANGE UP (ref 3.5–5.2)
ALP SERPL-CCNC: 90 U/L — SIGNIFICANT CHANGE UP (ref 30–115)
ALT FLD-CCNC: 23 U/L — SIGNIFICANT CHANGE UP (ref 0–41)
ANION GAP SERPL CALC-SCNC: 11 MMOL/L — SIGNIFICANT CHANGE UP (ref 7–14)
AST SERPL-CCNC: 15 U/L — SIGNIFICANT CHANGE UP (ref 0–41)
BASOPHILS # BLD AUTO: 0.02 K/UL — SIGNIFICANT CHANGE UP (ref 0–0.2)
BASOPHILS NFR BLD AUTO: 0.3 % — SIGNIFICANT CHANGE UP (ref 0–1)
BILIRUB SERPL-MCNC: 0.7 MG/DL — SIGNIFICANT CHANGE UP (ref 0.2–1.2)
BUN SERPL-MCNC: 18 MG/DL — SIGNIFICANT CHANGE UP (ref 10–20)
CALCIUM SERPL-MCNC: 9.6 MG/DL — SIGNIFICANT CHANGE UP (ref 8.5–10.1)
CHLORIDE SERPL-SCNC: 98 MMOL/L — SIGNIFICANT CHANGE UP (ref 98–110)
CO2 SERPL-SCNC: 28 MMOL/L — SIGNIFICANT CHANGE UP (ref 17–32)
CREAT SERPL-MCNC: 1.2 MG/DL — SIGNIFICANT CHANGE UP (ref 0.7–1.5)
EOSINOPHIL # BLD AUTO: 0.05 K/UL — SIGNIFICANT CHANGE UP (ref 0–0.7)
EOSINOPHIL NFR BLD AUTO: 0.8 % — SIGNIFICANT CHANGE UP (ref 0–8)
GAS PNL BLDV: SIGNIFICANT CHANGE UP
GLUCOSE SERPL-MCNC: 350 MG/DL — HIGH (ref 70–99)
HCT VFR BLD CALC: 38.7 % — LOW (ref 42–52)
HGB BLD-MCNC: 13.1 G/DL — LOW (ref 14–18)
IMM GRANULOCYTES NFR BLD AUTO: 0.7 % — HIGH (ref 0.1–0.3)
LYMPHOCYTES # BLD AUTO: 1.05 K/UL — LOW (ref 1.2–3.4)
LYMPHOCYTES # BLD AUTO: 17.2 % — LOW (ref 20.5–51.1)
MCHC RBC-ENTMCNC: 29.2 PG — SIGNIFICANT CHANGE UP (ref 27–31)
MCHC RBC-ENTMCNC: 33.9 G/DL — SIGNIFICANT CHANGE UP (ref 32–37)
MCV RBC AUTO: 86.4 FL — SIGNIFICANT CHANGE UP (ref 80–94)
MONOCYTES # BLD AUTO: 0.47 K/UL — SIGNIFICANT CHANGE UP (ref 0.1–0.6)
MONOCYTES NFR BLD AUTO: 7.7 % — SIGNIFICANT CHANGE UP (ref 1.7–9.3)
NEUTROPHILS # BLD AUTO: 4.48 K/UL — SIGNIFICANT CHANGE UP (ref 1.4–6.5)
NEUTROPHILS NFR BLD AUTO: 73.3 % — SIGNIFICANT CHANGE UP (ref 42.2–75.2)
PLATELET # BLD AUTO: 204 K/UL — SIGNIFICANT CHANGE UP (ref 130–400)
POTASSIUM SERPL-MCNC: 4.6 MMOL/L — SIGNIFICANT CHANGE UP (ref 3.5–5)
POTASSIUM SERPL-SCNC: 4.6 MMOL/L — SIGNIFICANT CHANGE UP (ref 3.5–5)
PROT SERPL-MCNC: 6.5 G/DL — SIGNIFICANT CHANGE UP (ref 6–8)
RBC # BLD: 4.48 M/UL — LOW (ref 4.7–6.1)
RBC # FLD: 12.8 % — SIGNIFICANT CHANGE UP (ref 11.5–14.5)
SODIUM SERPL-SCNC: 137 MMOL/L — SIGNIFICANT CHANGE UP (ref 135–146)
TROPONIN T SERPL-MCNC: <0.01 NG/ML — SIGNIFICANT CHANGE UP
WBC # BLD: 6.11 K/UL — SIGNIFICANT CHANGE UP (ref 4.8–10.8)
WBC # FLD AUTO: 6.11 K/UL — SIGNIFICANT CHANGE UP (ref 4.8–10.8)

## 2018-09-24 NOTE — ED PROVIDER NOTE - PROGRESS NOTE DETAILS
67 y/o M with PMHx CKD, type-2 diabetes, and HTN p/w potassium measured at 6.2. Pt is asymptomatic. PE: normal HEENT. Heart RRR. Lungs CTAB. Abdomen soft NTND. Extremities 2+ b/l pulses intact. Neuro normal speech, strength 5/5 all extremities, sensation grossly intact. A/P: eval for hyperkalemia, kidney injury or other electrolyte abnormalities.

## 2018-09-24 NOTE — ED PROVIDER NOTE - NS ED ROS FT
Constitutional: See HPI.  Eyes: No visual changes, eye pain or discharge. No Photophobia  ENMT: No neck pain or stiffness. No limited ROM  Cardiac: No SOB or edema. No chest pain with exertion.  Respiratory: No cough or respiratory distress. No hemoptysis.   GI: No nausea, vomiting, diarrhea or abdominal pain.  : No dysuria, frequency or burning. No Discharge  MS: No myalgia, muscle weakness, joint pain or back pain.  Neuro: No headache or weakness. No LOC.  Skin: No skin rash.  Except as documented in the HPI, all other systems are negative.

## 2018-09-24 NOTE — ED ADULT NURSE NOTE - NSIMPLEMENTINTERV_GEN_ALL_ED
Implemented All [employer]:  [place] to call system. Call bell, personal items and telephone within reach. Instruct patient to call for assistance. Room bathroom lighting operational. Non-slip footwear when patient is off stretcher. Physically safe environment: no spills, clutter or unnecessary equipment. Stretcher in lowest position, wheels locked, appropriate side rails in place.

## 2018-09-24 NOTE — ED PROVIDER NOTE - PHYSICAL EXAMINATION
AOx4, Non toxic appearing, NAD, speaking in full sentences. Skin  warm and dry, no acute rash. Head normocephalic, atraumatic. PERRLA/EOMI, conjunctiva and sclera clear. MM moist, no nasal discharge.  Pharynx unremarkable.  No mastoid or temporal ttp. Neck supple nt, no meningeal signs. Heart RRR s1s2 nl, no rub/murmur. Lungs- No retractions, BS equal, CTAB. Abdomen soft ntnd no r/g. Extremities- moves all, +equal distal pulses, brisk cap refill, sensation wnl, normal ROM. No LE edema, calves nttp b/l.

## 2018-09-24 NOTE — ED PROVIDER NOTE - OBJECTIVE STATEMENT
69 yo M with a hx of IDDM, HTN, neuropathy, CKD, presents from clinic with hyperkalemia and hyperglycemia. Showed on routine lab work. Per note sent in with patient, K was 6.2 and glucose was 460. Patient endorses not being compliant with diet. Patient has no complaints. Denies CP, palpitations, weakness, SOB, dysuria, abd pain, NVCD.

## 2018-09-27 ENCOUNTER — APPOINTMENT (OUTPATIENT)
Dept: PODIATRY | Facility: CLINIC | Age: 68
End: 2018-09-27

## 2018-10-02 ENCOUNTER — APPOINTMENT (OUTPATIENT)
Dept: PODIATRY | Facility: CLINIC | Age: 68
End: 2018-10-02
Payer: MEDICARE

## 2018-10-02 ENCOUNTER — OUTPATIENT (OUTPATIENT)
Dept: OUTPATIENT SERVICES | Facility: HOSPITAL | Age: 68
LOS: 1 days | Discharge: HOME | End: 2018-10-02

## 2018-10-02 VITALS
TEMPERATURE: 85 F | BODY MASS INDEX: 28.7 KG/M2 | HEIGHT: 71 IN | WEIGHT: 205 LBS | DIASTOLIC BLOOD PRESSURE: 89 MMHG | SYSTOLIC BLOOD PRESSURE: 164 MMHG

## 2018-10-02 PROCEDURE — 11055 PARING/CUTG B9 HYPRKER LES 1: CPT

## 2018-10-09 DIAGNOSIS — Z89.419 ACQUIRED ABSENCE OF UNSPECIFIED GREAT TOE: ICD-10-CM

## 2018-10-09 DIAGNOSIS — E11.40 TYPE 2 DIABETES MELLITUS WITH DIABETIC NEUROPATHY, UNSPECIFIED: ICD-10-CM

## 2018-10-09 DIAGNOSIS — L85.1 ACQUIRED KERATOSIS [KERATODERMA] PALMARIS ET PLANTARIS: ICD-10-CM

## 2018-10-18 ENCOUNTER — APPOINTMENT (OUTPATIENT)
Dept: PODIATRY | Facility: CLINIC | Age: 68
End: 2018-10-18
Payer: MEDICARE

## 2018-10-18 ENCOUNTER — OUTPATIENT (OUTPATIENT)
Dept: OUTPATIENT SERVICES | Facility: HOSPITAL | Age: 68
LOS: 1 days | Discharge: HOME | End: 2018-10-18

## 2018-10-18 VITALS — DIASTOLIC BLOOD PRESSURE: 79 MMHG | SYSTOLIC BLOOD PRESSURE: 146 MMHG | HEART RATE: 75 BPM

## 2018-10-18 PROCEDURE — 11056 PARNG/CUTG B9 HYPRKR LES 2-4: CPT

## 2018-11-01 ENCOUNTER — OUTPATIENT (OUTPATIENT)
Dept: OUTPATIENT SERVICES | Facility: HOSPITAL | Age: 68
LOS: 1 days | Discharge: HOME | End: 2018-11-01

## 2018-11-01 ENCOUNTER — APPOINTMENT (OUTPATIENT)
Dept: PODIATRY | Facility: CLINIC | Age: 68
End: 2018-11-01
Payer: MEDICARE

## 2018-11-01 VITALS
HEART RATE: 79 BPM | BODY MASS INDEX: 28.7 KG/M2 | WEIGHT: 205 LBS | HEIGHT: 71 IN | SYSTOLIC BLOOD PRESSURE: 108 MMHG | DIASTOLIC BLOOD PRESSURE: 67 MMHG

## 2018-11-01 PROCEDURE — 11056 PARNG/CUTG B9 HYPRKR LES 2-4: CPT | Mod: Q7

## 2018-11-06 DIAGNOSIS — Z89.419 ACQUIRED ABSENCE OF UNSPECIFIED GREAT TOE: ICD-10-CM

## 2018-11-06 DIAGNOSIS — L84 CORNS AND CALLOSITIES: ICD-10-CM

## 2018-11-06 DIAGNOSIS — E11.40 TYPE 2 DIABETES MELLITUS WITH DIABETIC NEUROPATHY, UNSPECIFIED: ICD-10-CM

## 2018-11-13 ENCOUNTER — APPOINTMENT (OUTPATIENT)
Dept: PODIATRY | Facility: CLINIC | Age: 68
End: 2018-11-13

## 2018-11-15 ENCOUNTER — APPOINTMENT (OUTPATIENT)
Dept: PODIATRY | Facility: CLINIC | Age: 68
End: 2018-11-15

## 2018-12-26 ENCOUNTER — INPATIENT (INPATIENT)
Facility: HOSPITAL | Age: 68
LOS: 5 days | Discharge: HOME HEALTH | End: 2019-01-01
Attending: INTERNAL MEDICINE | Admitting: INTERNAL MEDICINE
Payer: MEDICARE

## 2018-12-26 VITALS
SYSTOLIC BLOOD PRESSURE: 163 MMHG | HEART RATE: 78 BPM | DIASTOLIC BLOOD PRESSURE: 102 MMHG | RESPIRATION RATE: 20 BRPM | OXYGEN SATURATION: 99 % | TEMPERATURE: 98 F

## 2018-12-26 DIAGNOSIS — Z89.421 ACQUIRED ABSENCE OF OTHER RIGHT TOE(S): Chronic | ICD-10-CM

## 2018-12-26 LAB
ALBUMIN SERPL ELPH-MCNC: 4.5 G/DL — SIGNIFICANT CHANGE UP (ref 3.5–5.2)
ALP SERPL-CCNC: 91 U/L — SIGNIFICANT CHANGE UP (ref 30–115)
ALT FLD-CCNC: 29 U/L — SIGNIFICANT CHANGE UP (ref 0–41)
ANION GAP SERPL CALC-SCNC: 18 MMOL/L — HIGH (ref 7–14)
APTT BLD: 30.7 SEC — SIGNIFICANT CHANGE UP (ref 27–39.2)
AST SERPL-CCNC: 23 U/L — SIGNIFICANT CHANGE UP (ref 0–41)
BASE EXCESS BLDV CALC-SCNC: 1.6 MMOL/L — SIGNIFICANT CHANGE UP (ref -2–2)
BASOPHILS # BLD AUTO: 0.02 K/UL — SIGNIFICANT CHANGE UP (ref 0–0.2)
BASOPHILS NFR BLD AUTO: 0.2 % — SIGNIFICANT CHANGE UP (ref 0–1)
BILIRUB SERPL-MCNC: 0.6 MG/DL — SIGNIFICANT CHANGE UP (ref 0.2–1.2)
BUN SERPL-MCNC: 14 MG/DL — SIGNIFICANT CHANGE UP (ref 10–20)
CA-I SERPL-SCNC: 1.23 MMOL/L — SIGNIFICANT CHANGE UP (ref 1.12–1.3)
CALCIUM SERPL-MCNC: 9.5 MG/DL — SIGNIFICANT CHANGE UP (ref 8.5–10.1)
CHLORIDE SERPL-SCNC: 100 MMOL/L — SIGNIFICANT CHANGE UP (ref 98–110)
CO2 SERPL-SCNC: 24 MMOL/L — SIGNIFICANT CHANGE UP (ref 17–32)
CREAT SERPL-MCNC: 1.1 MG/DL — SIGNIFICANT CHANGE UP (ref 0.7–1.5)
EOSINOPHIL # BLD AUTO: 0.04 K/UL — SIGNIFICANT CHANGE UP (ref 0–0.7)
EOSINOPHIL NFR BLD AUTO: 0.4 % — SIGNIFICANT CHANGE UP (ref 0–8)
GAS PNL BLDV: 142 MMOL/L — SIGNIFICANT CHANGE UP (ref 136–145)
GAS PNL BLDV: SIGNIFICANT CHANGE UP
GLUCOSE SERPL-MCNC: 166 MG/DL — HIGH (ref 70–99)
HCO3 BLDV-SCNC: 29 MMOL/L — SIGNIFICANT CHANGE UP (ref 22–29)
HCT VFR BLD CALC: 37.7 % — LOW (ref 42–52)
HCT VFR BLDA CALC: 41.8 % — SIGNIFICANT CHANGE UP (ref 34–44)
HGB BLD CALC-MCNC: 13.6 G/DL — LOW (ref 14–18)
HGB BLD-MCNC: 12.7 G/DL — LOW (ref 14–18)
IMM GRANULOCYTES NFR BLD AUTO: 0.6 % — HIGH (ref 0.1–0.3)
INR BLD: 0.94 RATIO — SIGNIFICANT CHANGE UP (ref 0.65–1.3)
LACTATE BLDV-MCNC: 2.1 MMOL/L — HIGH (ref 0.5–1.6)
LYMPHOCYTES # BLD AUTO: 0.96 K/UL — LOW (ref 1.2–3.4)
LYMPHOCYTES # BLD AUTO: 9.4 % — LOW (ref 20.5–51.1)
MCHC RBC-ENTMCNC: 29.6 PG — SIGNIFICANT CHANGE UP (ref 27–31)
MCHC RBC-ENTMCNC: 33.7 G/DL — SIGNIFICANT CHANGE UP (ref 32–37)
MCV RBC AUTO: 87.9 FL — SIGNIFICANT CHANGE UP (ref 80–94)
MONOCYTES # BLD AUTO: 0.86 K/UL — HIGH (ref 0.1–0.6)
MONOCYTES NFR BLD AUTO: 8.4 % — SIGNIFICANT CHANGE UP (ref 1.7–9.3)
NEUTROPHILS # BLD AUTO: 8.29 K/UL — HIGH (ref 1.4–6.5)
NEUTROPHILS NFR BLD AUTO: 81 % — HIGH (ref 42.2–75.2)
NRBC # BLD: 0 /100 WBCS — SIGNIFICANT CHANGE UP (ref 0–0)
PCO2 BLDV: 55 MMHG — HIGH (ref 41–51)
PH BLDV: 7.33 — SIGNIFICANT CHANGE UP (ref 7.26–7.43)
PLATELET # BLD AUTO: 204 K/UL — SIGNIFICANT CHANGE UP (ref 130–400)
PO2 BLDV: 22 MMHG — SIGNIFICANT CHANGE UP (ref 20–40)
POTASSIUM BLDV-SCNC: 4.4 MMOL/L — SIGNIFICANT CHANGE UP (ref 3.3–5.6)
POTASSIUM SERPL-MCNC: 5 MMOL/L — SIGNIFICANT CHANGE UP (ref 3.5–5)
POTASSIUM SERPL-SCNC: 5 MMOL/L — SIGNIFICANT CHANGE UP (ref 3.5–5)
PROT SERPL-MCNC: 6.5 G/DL — SIGNIFICANT CHANGE UP (ref 6–8)
PROTHROM AB SERPL-ACNC: 10.8 SEC — SIGNIFICANT CHANGE UP (ref 9.95–12.87)
RBC # BLD: 4.29 M/UL — LOW (ref 4.7–6.1)
RBC # FLD: 13.1 % — SIGNIFICANT CHANGE UP (ref 11.5–14.5)
SAO2 % BLDV: 28 % — SIGNIFICANT CHANGE UP
SODIUM SERPL-SCNC: 142 MMOL/L — SIGNIFICANT CHANGE UP (ref 135–146)
WBC # BLD: 10.23 K/UL — SIGNIFICANT CHANGE UP (ref 4.8–10.8)
WBC # FLD AUTO: 10.23 K/UL — SIGNIFICANT CHANGE UP (ref 4.8–10.8)

## 2018-12-26 RX ORDER — PIPERACILLIN AND TAZOBACTAM 4; .5 G/20ML; G/20ML
4.5 INJECTION, POWDER, LYOPHILIZED, FOR SOLUTION INTRAVENOUS ONCE
Qty: 0 | Refills: 0 | Status: COMPLETED | OUTPATIENT
Start: 2018-12-26 | End: 2018-12-26

## 2018-12-26 RX ORDER — SODIUM CHLORIDE 9 MG/ML
1000 INJECTION INTRAMUSCULAR; INTRAVENOUS; SUBCUTANEOUS ONCE
Qty: 0 | Refills: 0 | Status: COMPLETED | OUTPATIENT
Start: 2018-12-26 | End: 2018-12-26

## 2018-12-26 RX ADMIN — SODIUM CHLORIDE 1000 MILLILITER(S): 9 INJECTION INTRAMUSCULAR; INTRAVENOUS; SUBCUTANEOUS at 20:55

## 2018-12-26 RX ADMIN — SODIUM CHLORIDE 1000 MILLILITER(S): 9 INJECTION INTRAMUSCULAR; INTRAVENOUS; SUBCUTANEOUS at 22:03

## 2018-12-26 RX ADMIN — PIPERACILLIN AND TAZOBACTAM 4.5 GRAM(S): 4; .5 INJECTION, POWDER, LYOPHILIZED, FOR SOLUTION INTRAVENOUS at 22:03

## 2018-12-26 RX ADMIN — PIPERACILLIN AND TAZOBACTAM 200 GRAM(S): 4; .5 INJECTION, POWDER, LYOPHILIZED, FOR SOLUTION INTRAVENOUS at 21:43

## 2018-12-26 NOTE — ED ADULT NURSE NOTE - NSIMPLEMENTINTERV_GEN_ALL_ED
Implemented All [employer]:  [place] to call system. Call bell, personal items and telephone within reach. Instruct patient to call for assistance. Room bathroom lighting operational. Non-slip footwear when patient is off stretcher. Physically safe environment: no spills, clutter or unnecessary equipment. Stretcher in lowest position, wheels locked, appropriate side rails in place.

## 2018-12-26 NOTE — ED ADULT NURSE NOTE - OBJECTIVE STATEMENT
Pt c/o left toe pain. Pt states he had a blister since last night and skin peeled off, showing black around wound. Denies n/v/d, denies fevers/chills. Pt h/o DM and h/o toe amputation

## 2018-12-27 LAB
ALBUMIN SERPL ELPH-MCNC: 3.7 G/DL — SIGNIFICANT CHANGE UP (ref 3.5–5.2)
ALP SERPL-CCNC: 90 U/L — SIGNIFICANT CHANGE UP (ref 30–115)
ALT FLD-CCNC: 23 U/L — SIGNIFICANT CHANGE UP (ref 0–41)
ANION GAP SERPL CALC-SCNC: 13 MMOL/L — SIGNIFICANT CHANGE UP (ref 7–14)
AST SERPL-CCNC: 16 U/L — SIGNIFICANT CHANGE UP (ref 0–41)
BASOPHILS # BLD AUTO: 0.02 K/UL — SIGNIFICANT CHANGE UP (ref 0–0.2)
BASOPHILS NFR BLD AUTO: 0.2 % — SIGNIFICANT CHANGE UP (ref 0–1)
BILIRUB SERPL-MCNC: 0.7 MG/DL — SIGNIFICANT CHANGE UP (ref 0.2–1.2)
BLD GP AB SCN SERPL QL: SIGNIFICANT CHANGE UP
BUN SERPL-MCNC: 13 MG/DL — SIGNIFICANT CHANGE UP (ref 10–20)
CALCIUM SERPL-MCNC: 8.6 MG/DL — SIGNIFICANT CHANGE UP (ref 8.5–10.1)
CHLORIDE SERPL-SCNC: 102 MMOL/L — SIGNIFICANT CHANGE UP (ref 98–110)
CO2 SERPL-SCNC: 26 MMOL/L — SIGNIFICANT CHANGE UP (ref 17–32)
CREAT SERPL-MCNC: 1.1 MG/DL — SIGNIFICANT CHANGE UP (ref 0.7–1.5)
EOSINOPHIL # BLD AUTO: 0.07 K/UL — SIGNIFICANT CHANGE UP (ref 0–0.7)
EOSINOPHIL NFR BLD AUTO: 0.8 % — SIGNIFICANT CHANGE UP (ref 0–8)
ERYTHROCYTE [SEDIMENTATION RATE] IN BLOOD: 13 MM/HR — HIGH (ref 0–10)
ESTIMATED AVERAGE GLUCOSE: 275 MG/DL — HIGH (ref 68–114)
GLUCOSE BLDC GLUCOMTR-MCNC: 117 MG/DL — HIGH (ref 70–99)
GLUCOSE BLDC GLUCOMTR-MCNC: 128 MG/DL — HIGH (ref 70–99)
GLUCOSE BLDC GLUCOMTR-MCNC: 219 MG/DL — HIGH (ref 70–99)
GLUCOSE BLDC GLUCOMTR-MCNC: 227 MG/DL — HIGH (ref 70–99)
GLUCOSE SERPL-MCNC: 275 MG/DL — HIGH (ref 70–99)
HBA1C BLD-MCNC: 11.2 % — HIGH (ref 4–5.6)
HCT VFR BLD CALC: 36 % — LOW (ref 42–52)
HGB BLD-MCNC: 11.8 G/DL — LOW (ref 14–18)
IMM GRANULOCYTES NFR BLD AUTO: 0.5 % — HIGH (ref 0.1–0.3)
LACTATE SERPL-SCNC: 1.2 MMOL/L — SIGNIFICANT CHANGE UP (ref 0.5–2.2)
LYMPHOCYTES # BLD AUTO: 1.12 K/UL — LOW (ref 1.2–3.4)
LYMPHOCYTES # BLD AUTO: 13 % — LOW (ref 20.5–51.1)
MAGNESIUM SERPL-MCNC: 1.7 MG/DL — LOW (ref 1.8–2.4)
MCHC RBC-ENTMCNC: 28.8 PG — SIGNIFICANT CHANGE UP (ref 27–31)
MCHC RBC-ENTMCNC: 32.8 G/DL — SIGNIFICANT CHANGE UP (ref 32–37)
MCV RBC AUTO: 87.8 FL — SIGNIFICANT CHANGE UP (ref 80–94)
MONOCYTES # BLD AUTO: 0.7 K/UL — HIGH (ref 0.1–0.6)
MONOCYTES NFR BLD AUTO: 8.1 % — SIGNIFICANT CHANGE UP (ref 1.7–9.3)
NEUTROPHILS # BLD AUTO: 6.68 K/UL — HIGH (ref 1.4–6.5)
NEUTROPHILS NFR BLD AUTO: 77.4 % — HIGH (ref 42.2–75.2)
NRBC # BLD: 0 /100 WBCS — SIGNIFICANT CHANGE UP (ref 0–0)
PLATELET # BLD AUTO: 176 K/UL — SIGNIFICANT CHANGE UP (ref 130–400)
POTASSIUM SERPL-MCNC: 5.2 MMOL/L — HIGH (ref 3.5–5)
POTASSIUM SERPL-SCNC: 5.2 MMOL/L — HIGH (ref 3.5–5)
PROT SERPL-MCNC: 6 G/DL — SIGNIFICANT CHANGE UP (ref 6–8)
RBC # BLD: 4.1 M/UL — LOW (ref 4.7–6.1)
RBC # FLD: 12.9 % — SIGNIFICANT CHANGE UP (ref 11.5–14.5)
SODIUM SERPL-SCNC: 141 MMOL/L — SIGNIFICANT CHANGE UP (ref 135–146)
TYPE + AB SCN PNL BLD: SIGNIFICANT CHANGE UP
WBC # BLD: 8.63 K/UL — SIGNIFICANT CHANGE UP (ref 4.8–10.8)
WBC # FLD AUTO: 8.63 K/UL — SIGNIFICANT CHANGE UP (ref 4.8–10.8)

## 2018-12-27 PROCEDURE — 99221 1ST HOSP IP/OBS SF/LOW 40: CPT

## 2018-12-27 PROCEDURE — 99222 1ST HOSP IP/OBS MODERATE 55: CPT

## 2018-12-27 PROCEDURE — 93925 LOWER EXTREMITY STUDY: CPT | Mod: 26

## 2018-12-27 RX ORDER — GLUCAGON INJECTION, SOLUTION 0.5 MG/.1ML
1 INJECTION, SOLUTION SUBCUTANEOUS ONCE
Qty: 0 | Refills: 0 | Status: DISCONTINUED | OUTPATIENT
Start: 2018-12-27 | End: 2018-12-28

## 2018-12-27 RX ORDER — DEXTROSE 50 % IN WATER 50 %
25 SYRINGE (ML) INTRAVENOUS ONCE
Qty: 0 | Refills: 0 | Status: DISCONTINUED | OUTPATIENT
Start: 2018-12-27 | End: 2018-12-28

## 2018-12-27 RX ORDER — INSULIN GLARGINE 100 [IU]/ML
45 INJECTION, SOLUTION SUBCUTANEOUS AT BEDTIME
Qty: 0 | Refills: 0 | Status: DISCONTINUED | OUTPATIENT
Start: 2018-12-27 | End: 2018-12-28

## 2018-12-27 RX ORDER — ENOXAPARIN SODIUM 100 MG/ML
40 INJECTION SUBCUTANEOUS DAILY
Qty: 0 | Refills: 0 | Status: DISCONTINUED | OUTPATIENT
Start: 2018-12-27 | End: 2018-12-28

## 2018-12-27 RX ORDER — INSULIN LISPRO 100/ML
15 VIAL (ML) SUBCUTANEOUS
Qty: 0 | Refills: 0 | Status: DISCONTINUED | OUTPATIENT
Start: 2018-12-27 | End: 2018-12-28

## 2018-12-27 RX ORDER — SODIUM CHLORIDE 9 MG/ML
1000 INJECTION, SOLUTION INTRAVENOUS
Qty: 0 | Refills: 0 | Status: DISCONTINUED | OUTPATIENT
Start: 2018-12-27 | End: 2018-12-28

## 2018-12-27 RX ORDER — MAGNESIUM SULFATE 500 MG/ML
2 VIAL (ML) INJECTION ONCE
Qty: 0 | Refills: 0 | Status: DISCONTINUED | OUTPATIENT
Start: 2018-12-27 | End: 2018-12-27

## 2018-12-27 RX ORDER — INSULIN GLARGINE 100 [IU]/ML
20 INJECTION, SOLUTION SUBCUTANEOUS ONCE
Qty: 0 | Refills: 0 | Status: COMPLETED | OUTPATIENT
Start: 2018-12-27 | End: 2018-12-27

## 2018-12-27 RX ORDER — CHLORHEXIDINE GLUCONATE 213 G/1000ML
1 SOLUTION TOPICAL
Qty: 0 | Refills: 0 | Status: DISCONTINUED | OUTPATIENT
Start: 2018-12-27 | End: 2018-12-28

## 2018-12-27 RX ORDER — INSULIN LISPRO 100/ML
VIAL (ML) SUBCUTANEOUS
Qty: 0 | Refills: 0 | Status: DISCONTINUED | OUTPATIENT
Start: 2018-12-27 | End: 2018-12-28

## 2018-12-27 RX ORDER — MAGNESIUM SULFATE 500 MG/ML
2 VIAL (ML) INJECTION ONCE
Qty: 0 | Refills: 0 | Status: COMPLETED | OUTPATIENT
Start: 2018-12-27 | End: 2018-12-27

## 2018-12-27 RX ORDER — DEXTROSE 50 % IN WATER 50 %
15 SYRINGE (ML) INTRAVENOUS ONCE
Qty: 0 | Refills: 0 | Status: DISCONTINUED | OUTPATIENT
Start: 2018-12-27 | End: 2018-12-28

## 2018-12-27 RX ORDER — PIPERACILLIN AND TAZOBACTAM 4; .5 G/20ML; G/20ML
3.38 INJECTION, POWDER, LYOPHILIZED, FOR SOLUTION INTRAVENOUS EVERY 8 HOURS
Qty: 0 | Refills: 0 | Status: DISCONTINUED | OUTPATIENT
Start: 2018-12-27 | End: 2018-12-27

## 2018-12-27 RX ORDER — INFLUENZA VIRUS VACCINE 15; 15; 15; 15 UG/.5ML; UG/.5ML; UG/.5ML; UG/.5ML
0.5 SUSPENSION INTRAMUSCULAR ONCE
Qty: 0 | Refills: 0 | Status: DISCONTINUED | OUTPATIENT
Start: 2018-12-27 | End: 2019-01-01

## 2018-12-27 RX ORDER — DEXTROSE 50 % IN WATER 50 %
12.5 SYRINGE (ML) INTRAVENOUS ONCE
Qty: 0 | Refills: 0 | Status: DISCONTINUED | OUTPATIENT
Start: 2018-12-27 | End: 2018-12-28

## 2018-12-27 RX ORDER — SODIUM POLYSTYRENE SULFONATE 4.1 MEQ/G
30 POWDER, FOR SUSPENSION ORAL ONCE
Qty: 0 | Refills: 0 | Status: COMPLETED | OUTPATIENT
Start: 2018-12-27 | End: 2018-12-27

## 2018-12-27 RX ADMIN — Medication 15 UNIT(S): at 17:20

## 2018-12-27 RX ADMIN — INSULIN GLARGINE 20 UNIT(S): 100 INJECTION, SOLUTION SUBCUTANEOUS at 21:42

## 2018-12-27 RX ADMIN — Medication 2: at 12:51

## 2018-12-27 RX ADMIN — PIPERACILLIN AND TAZOBACTAM 25 GRAM(S): 4; .5 INJECTION, POWDER, LYOPHILIZED, FOR SOLUTION INTRAVENOUS at 05:36

## 2018-12-27 RX ADMIN — Medication 15 UNIT(S): at 08:06

## 2018-12-27 RX ADMIN — Medication 2: at 08:05

## 2018-12-27 RX ADMIN — Medication 50 GRAM(S): at 16:12

## 2018-12-27 RX ADMIN — INSULIN GLARGINE 45 UNIT(S): 100 INJECTION, SOLUTION SUBCUTANEOUS at 08:06

## 2018-12-27 RX ADMIN — ENOXAPARIN SODIUM 40 MILLIGRAM(S): 100 INJECTION SUBCUTANEOUS at 12:50

## 2018-12-27 RX ADMIN — SODIUM POLYSTYRENE SULFONATE 30 GRAM(S): 4.1 POWDER, FOR SUSPENSION ORAL at 21:36

## 2018-12-27 RX ADMIN — CHLORHEXIDINE GLUCONATE 1 APPLICATION(S): 213 SOLUTION TOPICAL at 05:36

## 2018-12-27 RX ADMIN — Medication 15 UNIT(S): at 12:51

## 2018-12-27 NOTE — ED PROVIDER NOTE - ATTENDING CONTRIBUTION TO CARE
69 y/o male with h/o DM, s/p R big toe amputation in ER with c/o pain, blisters, and skin discoloration to toes on L foot.  Pt denies any trauma to area. Pt noted some blisters to bottom of L toes, today skin fell off L big toe and pt noted that skin on plantar surface toe was blackened.  no f/c.  no foot or calf pain/swelling.  no cp/sob.  no abd pain.  no n/v/d.  no ha/dizziness/loc.  PE - nad, nc/at, eomi, perrl, op - clear, cta b/l, no w/r/r, rrr, abd - soft, nt/nd, nabs, RLE - no swelling, no erythema, 2+ dp pulse, s/p R big toe amputation, LLE - no calf/foot swelling, foot warm, cap refill < 2 sec, + DP/PT pulse by doppler,  big toe with plantar ulceration ~ 3 x 5 cm with blackish discoloration just to plantar surface, no surrounding erythema, no drainage, + intact blisters to 2nd, 3rd, 4th, 5th toes, no open lesions.  -iv abx, podiatry consult.

## 2018-12-27 NOTE — CONSULT NOTE ADULT - ASSESSMENT
67 yo male with PMHx of DM2, HTN, DLD presenting for one day of left big toe ulceration. Patient states yesterday about 2pm he was removing his sock and his skin on the big toe came off along with it and he noticed the underlying skin was black so decided to come to the ED. Prior to that, he had no ulceration or discoloration. Diminished pulses on the left lower extremity  Vascular surgery called to evaluate DFU and r/o PVD    - please, obtain arterial duplex    SPECTRA [id number]
IMPRESSION:  Non infected autoamputation of left hallux  pressure related bullous changes along tip of 2-5 th toes with no evidence of infection  Hemorrhagic bullae along medial aspect of 2nd digit.    RECOMMENDATIONS:  F/u with podiatry  No ABx  Recall prn please

## 2018-12-27 NOTE — CONSULT NOTE ADULT - EXTREMITIES COMMENTS
Left hallux with dry necrotic eschar with no erythema/ drainage.  2-5th toe tips with bullous changes  no pulses

## 2018-12-27 NOTE — H&P ADULT - FAMILY HISTORY
Mother  Still living? Unknown  Family history of lung cancer, Age at diagnosis: Age Unknown     Father  Still living? Unknown  Family history of ischemic heart disease (IHD), Age at diagnosis: Age Unknown

## 2018-12-27 NOTE — CONSULT NOTE ADULT - ATTENDING COMMENTS
68 year old with left foot DFU.     Patient has non palpable pulse on the left. Will need arterial studies to eval. Will follow.

## 2018-12-27 NOTE — CONSULT NOTE ADULT - SUBJECTIVE AND OBJECTIVE BOX
JOSÉ MANUEL PORTER  68y, Male  Allergy: No Known Allergies      HPI:  69 yo male with PMHx of DM2, HTN, DLD presenting for one day of left big toe ulceration. Patient states yesterday about 2pm he was removing his sock and his skin on the big toe came off along with it and he noticed the underlying skin was black so decided to come to the ED. Prior to that, he had no ulceration or discoloration. Last week he missed a step and fell however unsure if he hit his feet at the time, otherwise denies trauma. Denies fevers, chills, nausea/vomiting, headache, erythema or swelling around the area. Had some clear fluid drainage at the time of skin tear, denies pus or bleeding. Admits to minimal pain around the area.     In the ED, afebrile HR 78 /102 O2sat 99% on room air. Given 1L NS and Zosyn 4.5g x1 dose. (27 Dec 2018 02:37)    FAMILY HISTORY:    PAST MEDICAL & SURGICAL HISTORY:  Dyslipidemia  HTN (hypertension)  DM (diabetes mellitus)  Toe amputation status, right: (2008)        VITALS:  T(F): 98.3, Max: 98.4 (12-27-18 @ 01:45)  HR: 79  BP: 162/92  RR: 18Vital Signs Last 24 Hrs  T(C): 36.8 (27 Dec 2018 04:46), Max: 36.9 (27 Dec 2018 01:45)  T(F): 98.3 (27 Dec 2018 04:46), Max: 98.4 (27 Dec 2018 01:45)  HR: 79 (27 Dec 2018 04:46) (77 - 79)  BP: 162/92 (27 Dec 2018 04:46) (134/75 - 186/86)  BP(mean): --  RR: 18 (27 Dec 2018 04:46) (18 - 20)  SpO2: 100% (26 Dec 2018 23:40) (99% - 100%)    TESTS & MEASUREMENTS:                        11.8   8.63  )-----------( 176      ( 27 Dec 2018 06:07 )             36.0     12-27    141  |  102  |  13  ----------------------------<  275<H>  5.2<H>   |  26  |  1.1    Ca    8.6      27 Dec 2018 06:07  Mg     1.7     12-27    TPro  6.0  /  Alb  3.7  /  TBili  0.7  /  DBili  x   /  AST  16  /  ALT  23  /  AlkPhos  90  12-27    LIVER FUNCTIONS - ( 27 Dec 2018 06:07 )  Alb: 3.7 g/dL / Pro: 6.0 g/dL / ALK PHOS: 90 U/L / ALT: 23 U/L / AST: 16 U/L / GGT: x                   RADIOLOGY & ADDITIONAL TESTS:    ANTIBIOTICS:  piperacillin/tazobactam IVPB. 3.375 Gram(s) IV Intermittent every 8 hours

## 2018-12-27 NOTE — H&P ADULT - NSHPLABSRESULTS_GEN_ALL_CORE
12.7   10.23 )-----------( 204      ( 26 Dec 2018 20:44 )             37.7     12-26    142  |  100  |  14  ----------------------------<  166<H>  5.0   |  24  |  1.1    Ca    9.5      26 Dec 2018 20:44    TPro  6.5  /  Alb  4.5  /  TBili  0.6  /  DBili  x   /  AST  23  /  ALT  29  /  AlkPhos  91  12-26      Lactate 12.7   10.23 )-----------( 204      ( 26 Dec 2018 20:44 )             37.7     12-26    142  |  100  |  14  ----------------------------<  166<H>  5.0   |  24  |  1.1    Ca    9.5      26 Dec 2018 20:44    TPro  6.5  /  Alb  4.5  /  TBili  0.6  /  DBili  x   /  AST  23  /  ALT  29  /  AlkPhos  91  12-26      Lactate 2.1 12.7   10.23 )-----------( 204      ( 26 Dec 2018 20:44 )             37.7     12-26    142  |  100  |  14  ----------------------------<  166<H>  5.0   |  24  |  1.1    Ca    9.5      26 Dec 2018 20:44    TPro  6.5  /  Alb  4.5  /  TBili  0.6  /  DBili  x   /  AST  23  /  ALT  29  /  AlkPhos  91  12-26      Lactate 2.1  < from: Xray Chest 1 View- PORTABLE-Routine (12.27.18 @ 13:25) >    No radiographic evidence of acute cardiopulmonary disease.    < end of copied text >    < from: 12 Lead ECG (09.24.18 @ 15:38) >      Diagnosis Line Normal sinus rhythm  Nonspecific T wave abnormality  Abnormal ECG    < end of copied text >

## 2018-12-27 NOTE — PRE-ANESTHESIA EVALUATION ADULT - NSANTHOSAYNRD_GEN_A_CORE
No. MACHELLE screening performed.  STOP BANG Legend: 0-2 = LOW Risk; 3-4 = INTERMEDIATE Risk; 5-8 = HIGH Risk

## 2018-12-27 NOTE — H&P ADULT - ATTENDING COMMENTS
patient seen and examined , agree with pgy 3 assesment and plan except as indicated above,   GEN Lying in no acute distress  HEENT Pupils equal and reactive to light and accommodationSupple Neck  PULM Clear to auscultation bilaterally  CV s1s2 regular rate and rhythm  GI + bowel sounds nontnender  EXT left toe skin tear with discoloration  PSYCH awake alert and oriented x 3  INTEG No Lesions  NEURO [place]  #Nonspecific T wave abnormality no acute intervention   #CKD 2 monitor   #DM monitor uncontrolled outpatient with elevated hga1c , will need insulin education upon dc ,  #Anemia no acute intervention   #Hyperkalemia repeat bmp   #Hypomagnesemia replete and check in am   DW HS

## 2018-12-27 NOTE — ED PROVIDER NOTE - OBJECTIVE STATEMENT
67 yo male hx of DM and right big toe amputation present c/o left big toe discoloration/pain. patient reported he noticed the skin was changing color yesterday and the skin fell off today. noticed "black color" to the bottom of left big toe so he came to ED for evaluation. denies injury and trauma. admitted he is a poor controlled DM.   Denies fever/chill/HA/dizziness/chest pain/palpitation/sob/abd pain/n/v/d/ black stool/bloody stool/urinary sxs

## 2018-12-27 NOTE — H&P ADULT - ASSESSMENT
67 yo male with PMHx of DM2, HTN, DLD presenting for one day of left big toe wound    # Left big toe wound  -No clinical signs of infection on exam, afebrile, no leukocytosis. S/p one dose of Zosyn in the ED  -Podiatry consult  -XR of left foot performed - f/u read  -Diminished pulses bilaterally, f/u arterial duplex - vascular consult if needed    # Diabetes mellitus  -On insulin 70/30 at home 50 units in AM, 40 units in PM, metformin  -Will start Lantus 45 units qHS, 15 units TID, sliding scale inpatient   -Monitor FS, adjust PRN  -F/u HbA1C    # HTN  -Unsure of his medications, wife will bring list in AM    # GI ppx  -DASH, carb consistent diet    # DVT ppx  -Lovenox subQ    # Disposition  -From home, lives with wife  -Code status: FULL CODE 69 yo male with PMHx of DM2, HTN, DLD presenting for one day of left big toe ulceration    # Left big toe wound/ulceration  -No drainage on exam, afebrile, no leukocytosis, vitals stable. Continue with Zosyn 3.375g q8h for now  -Podiatry consult  -XR of left foot performed - f/u read  -Diminished pulses bilaterally, f/u arterial duplex and vascular consult    # Diabetes mellitus  -On insulin 70/30 at home 50 units in AM, 40 units in PM, metformin  -Will start Lantus 45 units qHS, 15 units TID, sliding scale inpatient   -Monitor FS, adjust PRN  -F/u HbA1C    # HTN  -Unsure of his medications, wife will bring list in AM otherwise [facility]    # GI ppx  -DASH, carb consistent diet    # DVT ppx  -Lovenox subQ    # Disposition  -From home, lives with wife  -Code status: FULL CODE

## 2018-12-27 NOTE — CONSULT NOTE ADULT - ATTENDING COMMENTS
necrotic skin changes on plantar surface of the left hallux with some maceration in the 1st interspace. Blistering noted on the distal toes 2-5 with bluish-purple discoloration. xrays shows foreign body on the second toe. No open portals of entry appreciated. No erythema noted. Patient has faintly palpable pulses. Arterial duplex is pending. Patient is tentatively booked for surgery tomorrow at 11:30; however, removal of foreign body is non-urgent and if vascular surgery plans for any intervention, case will be rescheduled at a later time. NPO midnight. Please provide medical clearance for surgery. necrotic skin changes on plantar surface of the left hallux with some maceration in the 1st interspace. Blistering noted on the distal toes 2-5 with bluish-purple discoloration. xrays shows foreign body on the second toe. No open portals of entry appreciated. No erythema noted. Patient has faintly palpable pulses. Arterial duplex is pending. Patient is tentatively booked for surgery tomorrow at 11:30; however, removal of foreign body is non-urgent and if vascular surgery plans for any intervention, case will be rescheduled at a later time. NPO midnight. Please provide medical clearance for surgery.    Arterial duplex studies shows patient posterior tibial and peroneal arteries. Case was discussed with Dr. Espinosa. Patient has tibial occlusive disease and Vascular will likely perform an angio next week; however, patient is ok to proceed with tomorrows removal of foreign body.

## 2018-12-27 NOTE — H&P ADULT - NSHPPHYSICALEXAM_GEN_ALL_CORE
GENERAL: NAD, well-developed, AAOx3  HEENT: Atraumatic, Normocephalic. EOMI, No JVD  PULMONARY: Clear to auscultation bilaterally; No wheezing or rales  CARDIOVASCULAR: Regular rate and rhythm; No murmurs appreciated  GASTROINTESTINAL: Soft, Nontender, Nondistended; Bowel sounds present  EXTREMITIES: 1+ PT, DP pulses bilaterally. Right big toe s/p amputation. Left big toe skin removed with clearly demarcated borders. Blacked, hyperkeratotic skin underneath without erythema or edema surrounding the wound. No visible bone, no drainage, no foul odor. No sensation to wound and decreased sensation in all toes.  NEUROLOGY: No focal weakness. Decreased sensation bilateral LE to ankles GENERAL: NAD, well-developed, AAOx3  HEENT: Atraumatic, Normocephalic. EOMI, No JVD  PULMONARY: Clear to auscultation bilaterally; No wheezing or rales  CARDIOVASCULAR: Regular rate and rhythm; No murmurs appreciated  GASTROINTESTINAL: Soft, Nontender, Nondistended; Bowel sounds present  EXTREMITIES: 1+ PT, DP pulses bilaterally. Right big toe s/p amputation. Left big toe skin removed with clearly demarcated borders. Blacked, hyperkeratotic skin underneath without erythema or edema surrounding the wound. No visible bone, no drainage, no foul odor. No sensation to wound and decreased sensation in all toes. Skin appears to be peeling in rest of toes on left as well.  NEUROLOGY: No focal weakness. Decreased sensation bilateral LE to ankles

## 2018-12-27 NOTE — CONSULT NOTE ADULT - SUBJECTIVE AND OBJECTIVE BOX
PODIATRY CONSULT   JOSÉ MANUEL PORTER is a pleasant well-nourished, well developed 68y Male in no acute distress, alert awake, and oriented to person, place and time.   Patient is a 68y old  Male who presents with a chief complaint of Left big toe skin tear (27 Dec 2018 09:48)    HPI:  67 yo male with PMHx of DM2, HTN, DLD presenting for one day of left big toe ulceration. Patient states yesterday about 2pm he was removing his sock and his skin on the big toe came off along with it and he noticed the underlying skin was black so decided to come to the ED. Prior to that, he had no ulceration or discoloration. Last week he missed a step and fell however unsure if he hit his feet at the time, otherwise denies trauma. Denies fevers, chills, nausea/vomiting, headache, erythema or swelling around the area. Had some clear fluid drainage at the time of skin tear, denies pus or bleeding. Admits to minimal pain around the area.     In the ED, afebrile HR 78 /102 O2sat 99% on room air. Given 1L NS and Zosyn 4.5g x1 dose. (27 Dec 2018 02:37)    DIABETIC FOOT ULCER  Dyslipidemia  HTN (hypertension)  DM (diabetes mellitus)      PMH: DIABETIC FOOT ULCER  Dyslipidemia  HTN (hypertension)  DM (diabetes mellitus)  h/o right 1st hallux amputation.    PSH: Toe amputation status, right    Medication   Allergy: No Known Allergies        Labs:                        11.8   8.63  )-----------( 176      ( 27 Dec 2018 06:07 )             36.0     PT/INR - ( 26 Dec 2018 20:44 )   PT: 10.80 sec;   INR: 0.94 ratio         PTT - ( 26 Dec 2018 20:44 )  PTT:30.7 sec  12-27    141  |  102  |  13  ----------------------------<  275<H>  5.2<H>   |  26  |  1.1    Ca    8.6      27 Dec 2018 06:07  Mg     1.7     12-27    TPro  6.0  /  Alb  3.7  /  TBili  0.7  /  DBili  x   /  AST  16  /  ALT  23  /  AlkPhos  90  12-27      < from: Xray Foot AP + Lateral + Oblique, Left (12.26.18 @ 20:16) >  EXAM:  XR FOOT COMP MIN 3 VIEWS LT            PROCEDURE DATE:  12/26/2018            INTERPRETATION:  Clinical History / Reason for exam: Pain.    3 views.    Comparison: June 12, 2014.    Findings/  impression: Plantar second proximal phalangeal 1.2 cm x 0.5 cm metallic   foreign body. No acute fracture or dislocation. Stable degenerative   changes and cortical thickening.. Hallux valgus. Plantar calcaneal 3 mm   spur. Calcaneal Achilles tendon enthesophyte. Soft tissue vascular   calcification.                  NGUYEN BAEZ M.D., ATTENDING RADIOLOGIST  This document has been electronically signed. Dec 27 2018  9:44AM    < end of copied text >        O:   Derm: dried black discoloration right plantar hallux, + hyperkeratotic border, wound base Necrotic patches/ , wound size (3 cm X 5 cm ) - edema, - sly-wound erythema, - purulence, - fluctuance, - tracking/tunneling, - probe to bone. - streaking erythema. - xerosis, - hemosiderin deposits. - active sign of infection    Blister noted 2/3/4/5 distal plantar digits right foot, no signs of infection. no open lesion.  surgical scar tissue noted right 1st medial foot, stable.    Vascular: Dorsalis Pedis and Posterior Tibial pulses 0/4.  Capillary refill time delayed 2/2 blister R foot.  B/L feet warm to touch  Neuro: Protective sensation diminished to the level of the digits bilateral.  MSK: Muscle strength 5/5 all major muscle groups bilateral.        Assessment and Plan:   Friction blister R 1/2/3/4/5 foot, stable  Incidental finding on x-Ray; foreign body right plantar foot.    Chart reviewed and Patient evaluated  Discussed diagnosis and treatment with patient    Applied betadine with dry sterile dressing  recommend DARCO shoe left foot  vascular on board  X-rays reviewed: Plantar second proximal phalangeal 1.2 cm x 0.5 cm metallic   foreign body.   Vascular on board, waiting arterial duplex  ID on board: no abx  Arterial duplex ordered  Surgery scheduled tomorrow at 11:30am foreign body removal left foot  NPO MN  Optimize preop labs for tomorrow sx  Attending updated PODIATRY CONSULT   JOSÉ MANUEL PORTER is a pleasant well-nourished, well developed 68y Male in no acute distress, alert awake, and oriented to person, place and time.   Patient is a 68y old  Male who presents with a chief complaint of Left big toe skin tear (27 Dec 2018 09:48)    HPI:  69 yo male with PMHx of DM2, HTN, DLD presenting for one day of left big toe ulceration. Patient states yesterday about 2pm he was removing his sock and his skin on the big toe came off along with it and he noticed the underlying skin was black so decided to come to the ED. Prior to that, he had no ulceration or discoloration. Last week he missed a step and fell however unsure if he hit his feet at the time, otherwise denies trauma. Denies fevers, chills, nausea/vomiting, headache, erythema or swelling around the area. Had some clear fluid drainage at the time of skin tear, denies pus or bleeding. Admits to minimal pain around the area.     In the ED, afebrile HR 78 /102 O2sat 99% on room air. Given 1L NS and Zosyn 4.5g x1 dose. (27 Dec 2018 02:37)    DIABETIC FOOT ULCER  Dyslipidemia  HTN (hypertension)  DM (diabetes mellitus)      PMH: DIABETIC FOOT ULCER  Dyslipidemia  HTN (hypertension)  DM (diabetes mellitus)  h/o right 1st hallux amputation.    PSH: Toe amputation status, right    Medication   Allergy: No Known Allergies        Labs:                        11.8   8.63  )-----------( 176      ( 27 Dec 2018 06:07 )             36.0     PT/INR - ( 26 Dec 2018 20:44 )   PT: 10.80 sec;   INR: 0.94 ratio         PTT - ( 26 Dec 2018 20:44 )  PTT:30.7 sec  12-27    141  |  102  |  13  ----------------------------<  275<H>  5.2<H>   |  26  |  1.1    Ca    8.6      27 Dec 2018 06:07  Mg     1.7     12-27    TPro  6.0  /  Alb  3.7  /  TBili  0.7  /  DBili  x   /  AST  16  /  ALT  23  /  AlkPhos  90  12-27      < from: Xray Foot AP + Lateral + Oblique, Left (12.26.18 @ 20:16) >  EXAM:  XR FOOT COMP MIN 3 VIEWS LT            PROCEDURE DATE:  12/26/2018            INTERPRETATION:  Clinical History / Reason for exam: Pain.    3 views.    Comparison: June 12, 2014.    Findings/  impression: Plantar second proximal phalangeal 1.2 cm x 0.5 cm metallic   foreign body. No acute fracture or dislocation. Stable degenerative   changes and cortical thickening.. Hallux valgus. Plantar calcaneal 3 mm   spur. Calcaneal Achilles tendon enthesophyte. Soft tissue vascular   calcification.                  NGUYEN BAEZ M.D., ATTENDING RADIOLOGIST  This document has been electronically signed. Dec 27 2018  9:44AM    < end of copied text >        O:   Derm: dried black discoloration right plantar hallux, + hyperkeratotic border, wound base Necrotic patches/ , wound size (3 cm X 5 cm ) - edema, - sly-wound erythema, - purulence, - fluctuance, - tracking/tunneling, - probe to bone. - streaking erythema. - xerosis, - hemosiderin deposits. - active sign of infection    Blister noted 2/3/4/5 distal plantar digits right foot, no signs of infection. no open lesion.  surgical scar tissue noted right 1st medial foot, stable.    Vascular: Dorsalis Pedis and Posterior Tibial pulses 0/4.  Capillary refill time delayed 2/2 blister R foot.  B/L feet warm to touch  Neuro: Protective sensation diminished to the level of the digits bilateral.  MSK: Muscle strength 5/5 all major muscle groups bilateral.        Assessment and Plan:   Friction blister R 1/2/3/4/5 foot, stable  Incidental finding on x-Ray; foreign body right plantar foot.    Chart reviewed and Patient evaluated  Discussed diagnosis and treatment with patient    Applied betadine with dry sterile dressing  recommend DARCO shoe left foot  vascular on board  X-rays reviewed: Plantar second proximal phalangeal 1.2 cm x 0.5 cm metallic   foreign body.   Vascular on board, waiting arterial duplex  ID on board: no abx  Arterial duplex ordered  Surgery scheduled tomorrow at 11:30am foreign body removal left foot  NPO MN  Optimize preop labs for tomorrow sx  potassium 5.2, needs to be <5 for the OR  Attending updated

## 2018-12-27 NOTE — CONSULT NOTE ADULT - SUBJECTIVE AND OBJECTIVE BOX
VASCULAR SURGERY CONSULT NOTE      HPI:  67 yo male with PMHx of DM2, HTN, DLD presenting for one day of left big toe ulceration. Patient states yesterday about 2pm he was removing his sock and his skin on the big toe came off along with it and he noticed the underlying skin was black so decided to come to the ED. Prior to that, he had no ulceration or discoloration. Last week he missed a step and fell however unsure if he hit his feet at the time, otherwise denies trauma. Denies fevers, chills, nausea/vomiting, headache, erythema or swelling around the area. Had some clear fluid drainage at the time of skin tear, denies pus or bleeding. Admits to minimal pain around the area.     In the ED, afebrile HR 78 /102 O2sat 99% on room air. Given 1L NS and Zosyn 4.5g x1 dose. (27 Dec 2018 02:37)        PAST MEDICAL & SURGICAL HISTORY:  Dyslipidemia  HTN (hypertension)  DM (diabetes mellitus)  Toe amputation status, right: (2008)    No Known Allergies    Home Medications:  Glucophage 1000 mg oral tablet: 1 tab(s) orally 2 times a day (27 Dec 2018 02:59)  HumuLIN 70/30 KwikPen 70 units-30 units/mL subcutaneous suspension: 50 unit(s) subcutaneous once a day before breakfast (27 Dec 2018 02:59)  HumuLIN 70/30 subcutaneous suspension: 40 unit(s) subcutaneous once a day (at bedtime) (27 Dec 2018 02:59)    No permtinent family history of PVD    REVIEW OF SYSTEMS:  GENERAL:                                         negative  SKIN:                                                 see HPI  OPTHALMOLOGIC:                          negative  ENMT:                                               negative  RESPIRATORY AND THORAX:        negative  CARDIOVASCULAR:                       see HPI  GASTROINTESTINAL:                       negative  NEPHROLOGY:                                  negative  MUSCULOSKELETAL:                       negative  NEUROLOGIC:                                   negative  PSYCHIATRIC:                                    negative  HEMATOLOGY/LYMPHATICS:         negative  ENDOCRINE:                                 see HPI  ALLERGIC/IMMUNOLOGIC:            negative    12 point ROS otherwise normal except as stated in HPI    PHYSICAL EXAM  Vital Signs Last 24 Hrs  T(C): 36.8 (27 Dec 2018 04:46), Max: 36.9 (27 Dec 2018 01:45)  T(F): 98.3 (27 Dec 2018 04:46), Max: 98.4 (27 Dec 2018 01:45)  HR: 79 (27 Dec 2018 04:46) (77 - 79)  BP: 162/92 (27 Dec 2018 04:46) (134/75 - 186/86)  BP(mean): --  RR: 18 (27 Dec 2018 04:46) (18 - 20)  SpO2: 100% (26 Dec 2018 23:40) (99% - 100%)    Appearance: Normal	  HEENT:   Normal oral mucosa, PERRL, EOMI	  Neck: Supple, - JVD; Carotid Bruit   Cardiovascular: Normal S1 S2, No JVD, No murmurs,   Respiratory: Lungs clear to auscultation, No Rales, Rhonchi, Wheezing	  Gastrointestinal:  Soft, Non-tender, positive BS	  Skin: No rashes, No ecchymoses, No cyanosis  Extremities: Normal range of motion, No clubbing, cyanosis or edema  Right: s/p hallux amp  Left hallux dorsal skin sloughing, erythema  Neurologic: Non-focal  Psychiatry: A & O x 3, Mood & affect appropriate      PULSES:  Femoral:  Popliteal:  Dorsal Pedal: Right palpable, left dopplerable  Posterior Tibial: Right palpable, left dopplerable  Capillary:    MEDICATIONS:   MEDICATIONS  (STANDING):  chlorhexidine 4% Liquid 1 Application(s) Topical <User Schedule>  dextrose 5%. 1000 milliLiter(s) (50 mL/Hr) IV Continuous <Continuous>  dextrose 50% Injectable 12.5 Gram(s) IV Push once  dextrose 50% Injectable 25 Gram(s) IV Push once  dextrose 50% Injectable 25 Gram(s) IV Push once  enoxaparin Injectable 40 milliGRAM(s) SubCutaneous daily  influenza   Vaccine 0.5 milliLiter(s) IntraMuscular once  insulin glargine Injectable (LANTUS) 45 Unit(s) SubCutaneous at bedtime  insulin lispro (HumaLOG) corrective regimen sliding scale   SubCutaneous three times a day before meals  insulin lispro Injectable (HumaLOG) 15 Unit(s) SubCutaneous three times a day before meals  magnesium sulfate  IVPB 2 Gram(s) IV Intermittent once  piperacillin/tazobactam IVPB. 3.375 Gram(s) IV Intermittent every 8 hours    MEDICATIONS  (PRN):  dextrose 40% Gel 15 Gram(s) Oral once PRN Blood Glucose LESS THAN 70 milliGRAM(s)/deciliter  glucagon  Injectable 1 milliGRAM(s) IntraMuscular once PRN Glucose LESS THAN 70 milligrams/deciliter      LAB/STUDIES:                        11.8   8.63  )-----------( 176      ( 27 Dec 2018 06:07 )             36.0     12-27    141  |  102  |  13  ----------------------------<  275<H>  5.2<H>   |  26  |  1.1    Ca    8.6      27 Dec 2018 06:07  Mg     1.7     12-27    TPro  6.0  /  Alb  3.7  /  TBili  0.7  /  DBili  x   /  AST  16  /  ALT  23  /  AlkPhos  90  12-27    PT/INR - ( 26 Dec 2018 20:44 )   PT: 10.80 sec;   INR: 0.94 ratio         PTT - ( 26 Dec 2018 20:44 )  PTT:30.7 sec  LIVER FUNCTIONS - ( 27 Dec 2018 06:07 )  Alb: 3.7 g/dL / Pro: 6.0 g/dL / ALK PHOS: 90 U/L / ALT: 23 U/L / AST: 16 U/L / GGT: x                             IMAGING:  pending arterial duplex

## 2018-12-27 NOTE — ED PROVIDER NOTE - MEDICAL DECISION MAKING DETAILS
Pt with h/o DM with c/o L toe discoloration after blister skin tore off.  no f/c.  + dp/pt pulses by doppler.  pt given iv abx, case d/w podiatry, will see on consult.  arterial duplex pending on admission.

## 2018-12-27 NOTE — ED PROVIDER NOTE - PHYSICAL EXAMINATION
CONSTITUTIONAL: Well-appearing; well-nourished; in no apparent distress.   NECK: Supple; non-tender; no cervical lymphadenopathy. No JVD.   CARDIOVASCULAR: Normal S1, S2; no murmurs, rubs, or gallops.   RESPIRATORY: Normal chest excursion with respiration; breath sounds clear and equal bilaterally; no wheezes, rhonchi, or rales.  GI/: Normal bowel sounds; non-distended; non-tender; no palpable organomegaly.   MS: left foot/ankle with good ROM. faint left DP pulses which are detectable with doppler. left foot warm to touch with normal cap refill. ambulate with steady gait.   SKIN: + ulcer to left big toe with dark discoloration noted to plantar aspect of left big toe. + blisters noted to tip of left 2nd to 5th toe. + onychomycosis to nails.   NEURO/PSYCH: A & O x 4; grossly unremarkable. mood and manner are appropriate.

## 2018-12-27 NOTE — PROGRESS NOTE ADULT - SUBJECTIVE AND OBJECTIVE BOX
Podiatry Pre-Operative Note   JOSÉ MANUEL PORTER is a 68y year old Male patient presenting to the operating room for surgical management of the left foot. The patient has failed all conservative measures and requires surgical intervention at this time.       PAST MEDICAL & SURGICAL HISTORY:  Dyslipidemia  HTN (hypertension)  DM (diabetes mellitus)  Toe amputation status, right: (2008)      Medications:   chlorhexidine 4% Liquid 1 Application(s) Topical <User Schedule>  dextrose 40% Gel 15 Gram(s) Oral once PRN  dextrose 5%. 1000 milliLiter(s) IV Continuous <Continuous>  dextrose 50% Injectable 12.5 Gram(s) IV Push once  dextrose 50% Injectable 25 Gram(s) IV Push once  dextrose 50% Injectable 25 Gram(s) IV Push once  enoxaparin Injectable 40 milliGRAM(s) SubCutaneous daily  glucagon  Injectable 1 milliGRAM(s) IntraMuscular once PRN  influenza   Vaccine 0.5 milliLiter(s) IntraMuscular once  insulin glargine Injectable (LANTUS) 45 Unit(s) SubCutaneous at bedtime  insulin lispro (HumaLOG) corrective regimen sliding scale   SubCutaneous three times a day before meals  insulin lispro Injectable (HumaLOG) 15 Unit(s) SubCutaneous three times a day before meals    Allergies    No Known Allergies    Intolerances        Consent [x_]  H&P [x_]  Medical Clearance [pending_]  EKG [x_]  CXR [x_]  UCG [n/a_]  T&S [x_]                          11.8   8.63  )-----------( 176      ( 27 Dec 2018 06:07 )             36.0     12-27    141  |  102  |  13  ----------------------------<  275<H>  5.2<H>   |  26  |  1.1    Ca    8.6      27 Dec 2018 06:07  Mg     1.7     12-27    TPro  6.0  /  Alb  3.7  /  TBili  0.7  /  DBili  x   /  AST  16  /  ALT  23  /  AlkPhos  90  12-27    PT/INR - ( 26 Dec 2018 20:44 )   PT: 10.80 sec;   INR: 0.94 ratio         PTT - ( 26 Dec 2018 20:44 )  PTT:30.7 sec    CAPILLARY BLOOD GLUCOSE      POCT Blood Glucose.: 117 mg/dL (27 Dec 2018 16:52)  POCT Blood Glucose.: 219 mg/dL (27 Dec 2018 12:03)  POCT Blood Glucose.: 227 mg/dL (27 Dec 2018 07:52)            T(C): 35.9 (12-27-18 @ 18:01), Max: 36.9 (12-27-18 @ 01:45)  HR: 84 (12-27-18 @ 18:01) (77 - 84)  BP: 141/70 (12-27-18 @ 18:01) (134/75 - 186/86)  RR: 18 (12-27-18 @ 18:01) (18 - 20)  SpO2: 100% (12-26-18 @ 23:40) (100% - 100%)    Surgeon: Dr. Bhatia  Assistant (s): [facility] residents  Pre Operative Diagnosis: foreign body left foot   Planned Procedure: removal of foreign body left foot     The patient has beenn educated on the above procedure, with all risks and benefits described. No guarentees were given or implied for the aforementioned procedure.  The above assistant(s) have introduced themselves to the patient. The patient consents to their participation in the procedure listed above. Podiatry Pre-Operative Note   JOSÉ MANUEL PORTER is a 68y year old Male patient presenting to the operating room for surgical management of the left foot foreign body. Procedure is indicated prevent formation of infective granuloma      PAST MEDICAL & SURGICAL HISTORY:  Dyslipidemia  HTN (hypertension)  DM (diabetes mellitus)  Toe amputation status, right: (2008)      Medications:   chlorhexidine 4% Liquid 1 Application(s) Topical <User Schedule>  dextrose 40% Gel 15 Gram(s) Oral once PRN  dextrose 5%. 1000 milliLiter(s) IV Continuous <Continuous>  dextrose 50% Injectable 12.5 Gram(s) IV Push once  dextrose 50% Injectable 25 Gram(s) IV Push once  dextrose 50% Injectable 25 Gram(s) IV Push once  enoxaparin Injectable 40 milliGRAM(s) SubCutaneous daily  glucagon  Injectable 1 milliGRAM(s) IntraMuscular once PRN  influenza   Vaccine 0.5 milliLiter(s) IntraMuscular once  insulin glargine Injectable (LANTUS) 45 Unit(s) SubCutaneous at bedtime  insulin lispro (HumaLOG) corrective regimen sliding scale   SubCutaneous three times a day before meals  insulin lispro Injectable (HumaLOG) 15 Unit(s) SubCutaneous three times a day before meals    Allergies    No Known Allergies    Intolerances        Consent [x_]  H&P [x_]  Medical Clearance [pending_]  EKG [x_]  CXR [x_]  UCG [n/a_]  T&S [x_]                          11.8   8.63  )-----------( 176      ( 27 Dec 2018 06:07 )             36.0     12-27    141  |  102  |  13  ----------------------------<  275<H>  5.2<H>   |  26  |  1.1    Ca    8.6      27 Dec 2018 06:07  Mg     1.7     12-27    TPro  6.0  /  Alb  3.7  /  TBili  0.7  /  DBili  x   /  AST  16  /  ALT  23  /  AlkPhos  90  12-27    PT/INR - ( 26 Dec 2018 20:44 )   PT: 10.80 sec;   INR: 0.94 ratio         PTT - ( 26 Dec 2018 20:44 )  PTT:30.7 sec    CAPILLARY BLOOD GLUCOSE      POCT Blood Glucose.: 117 mg/dL (27 Dec 2018 16:52)  POCT Blood Glucose.: 219 mg/dL (27 Dec 2018 12:03)  POCT Blood Glucose.: 227 mg/dL (27 Dec 2018 07:52)            T(C): 35.9 (12-27-18 @ 18:01), Max: 36.9 (12-27-18 @ 01:45)  HR: 84 (12-27-18 @ 18:01) (77 - 84)  BP: 141/70 (12-27-18 @ 18:01) (134/75 - 186/86)  RR: 18 (12-27-18 @ 18:01) (18 - 20)  SpO2: 100% (12-26-18 @ 23:40) (100% - 100%)    Surgeon: Dr. Bhatia  Assistant (s): [facility] residents  Pre Operative Diagnosis: foreign body left foot   Planned Procedure: removal of foreign body left foot     The patient has beenn educated on the above procedure, with all risks and benefits described. No guarentees were given or implied for the aforementioned procedure.  The above assistant(s) have introduced themselves to the patient. The patient consents to their participation in the procedure listed above.

## 2018-12-27 NOTE — H&P ADULT - HISTORY OF PRESENT ILLNESS
67 yo male with PMHx of DM2, HTN, DLD presenting for one day of left big toe ulceration. Patient states yesterday about 2pm he was removing his sock and his skin on the big toe came off along with it and he noticed the underlying skin was black so decided to come to the ED. Prior to that, he had no ulceration or discoloration. Last week he missed a step and fell however unsure if he hit his feet at the time, otherwise denies trauma. Denies fevers, chills, nausea/vomiting, headache, erythema or swelling around the area. Had some clear fluid drainage at the time of skin tear, denies pus or bleeding. Admits to minimal pain around the area.     In the ED, afebrile HR 78 /102 O2sat 99% on room air. Given 1L NS and Zosyn 4.5g x1 dose.

## 2018-12-28 ENCOUNTER — RESULT REVIEW (OUTPATIENT)
Age: 68
End: 2018-12-28

## 2018-12-28 LAB
ALBUMIN SERPL ELPH-MCNC: 3.7 G/DL — SIGNIFICANT CHANGE UP (ref 3.5–5.2)
ALP SERPL-CCNC: 80 U/L — SIGNIFICANT CHANGE UP (ref 30–115)
ALT FLD-CCNC: 21 U/L — SIGNIFICANT CHANGE UP (ref 0–41)
ANION GAP SERPL CALC-SCNC: 14 MMOL/L — SIGNIFICANT CHANGE UP (ref 7–14)
AST SERPL-CCNC: 17 U/L — SIGNIFICANT CHANGE UP (ref 0–41)
BASOPHILS # BLD AUTO: 0.01 K/UL — SIGNIFICANT CHANGE UP (ref 0–0.2)
BASOPHILS NFR BLD AUTO: 0.1 % — SIGNIFICANT CHANGE UP (ref 0–1)
BILIRUB SERPL-MCNC: 0.6 MG/DL — SIGNIFICANT CHANGE UP (ref 0.2–1.2)
BLD GP AB SCN SERPL QL: SIGNIFICANT CHANGE UP
BUN SERPL-MCNC: 11 MG/DL — SIGNIFICANT CHANGE UP (ref 10–20)
CALCIUM SERPL-MCNC: 8.9 MG/DL — SIGNIFICANT CHANGE UP (ref 8.5–10.1)
CHLORIDE SERPL-SCNC: 101 MMOL/L — SIGNIFICANT CHANGE UP (ref 98–110)
CO2 SERPL-SCNC: 24 MMOL/L — SIGNIFICANT CHANGE UP (ref 17–32)
CREAT SERPL-MCNC: 0.9 MG/DL — SIGNIFICANT CHANGE UP (ref 0.7–1.5)
EOSINOPHIL # BLD AUTO: 0.06 K/UL — SIGNIFICANT CHANGE UP (ref 0–0.7)
EOSINOPHIL NFR BLD AUTO: 0.8 % — SIGNIFICANT CHANGE UP (ref 0–8)
GLUCOSE BLDC GLUCOMTR-MCNC: 204 MG/DL — HIGH (ref 70–99)
GLUCOSE BLDC GLUCOMTR-MCNC: 337 MG/DL — HIGH (ref 70–99)
GLUCOSE BLDC GLUCOMTR-MCNC: 388 MG/DL — HIGH (ref 70–99)
GLUCOSE SERPL-MCNC: 224 MG/DL — HIGH (ref 70–99)
HCT VFR BLD CALC: 35.7 % — LOW (ref 42–52)
HGB BLD-MCNC: 12.2 G/DL — LOW (ref 14–18)
IMM GRANULOCYTES NFR BLD AUTO: 0.4 % — HIGH (ref 0.1–0.3)
LYMPHOCYTES # BLD AUTO: 0.94 K/UL — LOW (ref 1.2–3.4)
LYMPHOCYTES # BLD AUTO: 12.9 % — LOW (ref 20.5–51.1)
MAGNESIUM SERPL-MCNC: 1.9 MG/DL — SIGNIFICANT CHANGE UP (ref 1.8–2.4)
MCHC RBC-ENTMCNC: 29.6 PG — SIGNIFICANT CHANGE UP (ref 27–31)
MCHC RBC-ENTMCNC: 34.2 G/DL — SIGNIFICANT CHANGE UP (ref 32–37)
MCV RBC AUTO: 86.7 FL — SIGNIFICANT CHANGE UP (ref 80–94)
MONOCYTES # BLD AUTO: 0.66 K/UL — HIGH (ref 0.1–0.6)
MONOCYTES NFR BLD AUTO: 9.1 % — SIGNIFICANT CHANGE UP (ref 1.7–9.3)
NEUTROPHILS # BLD AUTO: 5.57 K/UL — SIGNIFICANT CHANGE UP (ref 1.4–6.5)
NEUTROPHILS NFR BLD AUTO: 76.7 % — HIGH (ref 42.2–75.2)
NRBC # BLD: 0 /100 WBCS — SIGNIFICANT CHANGE UP (ref 0–0)
PLATELET # BLD AUTO: 186 K/UL — SIGNIFICANT CHANGE UP (ref 130–400)
POTASSIUM SERPL-MCNC: 4.5 MMOL/L — SIGNIFICANT CHANGE UP (ref 3.5–5)
POTASSIUM SERPL-SCNC: 4.5 MMOL/L — SIGNIFICANT CHANGE UP (ref 3.5–5)
PROT SERPL-MCNC: 5.9 G/DL — LOW (ref 6–8)
RBC # BLD: 4.12 M/UL — LOW (ref 4.7–6.1)
RBC # FLD: 13.1 % — SIGNIFICANT CHANGE UP (ref 11.5–14.5)
SODIUM SERPL-SCNC: 139 MMOL/L — SIGNIFICANT CHANGE UP (ref 135–146)
TYPE + AB SCN PNL BLD: SIGNIFICANT CHANGE UP
WBC # BLD: 7.27 K/UL — SIGNIFICANT CHANGE UP (ref 4.8–10.8)
WBC # FLD AUTO: 7.27 K/UL — SIGNIFICANT CHANGE UP (ref 4.8–10.8)

## 2018-12-28 PROCEDURE — 28192 REMOVAL OF FOOT FOREIGN BODY: CPT

## 2018-12-28 RX ORDER — PANTOPRAZOLE SODIUM 20 MG/1
40 TABLET, DELAYED RELEASE ORAL
Qty: 0 | Refills: 0 | Status: DISCONTINUED | OUTPATIENT
Start: 2018-12-28 | End: 2019-01-01

## 2018-12-28 RX ORDER — SERTRALINE 25 MG/1
50 TABLET, FILM COATED ORAL DAILY
Qty: 0 | Refills: 0 | Status: DISCONTINUED | OUTPATIENT
Start: 2018-12-28 | End: 2019-01-01

## 2018-12-28 RX ORDER — SODIUM CHLORIDE 9 MG/ML
1000 INJECTION INTRAMUSCULAR; INTRAVENOUS; SUBCUTANEOUS
Qty: 0 | Refills: 0 | Status: DISCONTINUED | OUTPATIENT
Start: 2018-12-28 | End: 2018-12-28

## 2018-12-28 RX ORDER — INSULIN LISPRO 100/ML
8 VIAL (ML) SUBCUTANEOUS ONCE
Qty: 0 | Refills: 0 | Status: COMPLETED | OUTPATIENT
Start: 2018-12-28 | End: 2018-12-28

## 2018-12-28 RX ORDER — CHLORHEXIDINE GLUCONATE 213 G/1000ML
1 SOLUTION TOPICAL
Qty: 0 | Refills: 0 | Status: DISCONTINUED | OUTPATIENT
Start: 2018-12-28 | End: 2019-01-01

## 2018-12-28 RX ORDER — ATORVASTATIN CALCIUM 80 MG/1
40 TABLET, FILM COATED ORAL AT BEDTIME
Qty: 0 | Refills: 0 | Status: DISCONTINUED | OUTPATIENT
Start: 2018-12-28 | End: 2019-01-01

## 2018-12-28 RX ORDER — SERTRALINE 25 MG/1
50 TABLET, FILM COATED ORAL DAILY
Qty: 0 | Refills: 0 | Status: DISCONTINUED | OUTPATIENT
Start: 2018-12-28 | End: 2018-12-28

## 2018-12-28 RX ORDER — DEXTROSE 50 % IN WATER 50 %
15 SYRINGE (ML) INTRAVENOUS ONCE
Qty: 0 | Refills: 0 | Status: DISCONTINUED | OUTPATIENT
Start: 2018-12-28 | End: 2019-01-01

## 2018-12-28 RX ORDER — INSULIN LISPRO 100/ML
15 VIAL (ML) SUBCUTANEOUS
Qty: 0 | Refills: 0 | Status: DISCONTINUED | OUTPATIENT
Start: 2018-12-28 | End: 2019-01-01

## 2018-12-28 RX ORDER — PANTOPRAZOLE SODIUM 20 MG/1
40 TABLET, DELAYED RELEASE ORAL
Qty: 0 | Refills: 0 | Status: DISCONTINUED | OUTPATIENT
Start: 2018-12-28 | End: 2018-12-28

## 2018-12-28 RX ORDER — LACTULOSE 10 G/15ML
20 SOLUTION ORAL ONCE
Qty: 0 | Refills: 0 | Status: DISCONTINUED | OUTPATIENT
Start: 2018-12-28 | End: 2018-12-28

## 2018-12-28 RX ORDER — ATORVASTATIN CALCIUM 80 MG/1
40 TABLET, FILM COATED ORAL AT BEDTIME
Qty: 0 | Refills: 0 | Status: DISCONTINUED | OUTPATIENT
Start: 2018-12-28 | End: 2018-12-28

## 2018-12-28 RX ORDER — GLUCAGON INJECTION, SOLUTION 0.5 MG/.1ML
1 INJECTION, SOLUTION SUBCUTANEOUS ONCE
Qty: 0 | Refills: 0 | Status: DISCONTINUED | OUTPATIENT
Start: 2018-12-28 | End: 2019-01-01

## 2018-12-28 RX ORDER — LACTULOSE 10 G/15ML
20 SOLUTION ORAL ONCE
Qty: 0 | Refills: 0 | Status: DISCONTINUED | OUTPATIENT
Start: 2018-12-28 | End: 2019-01-01

## 2018-12-28 RX ORDER — MORPHINE SULFATE 50 MG/1
2 CAPSULE, EXTENDED RELEASE ORAL
Qty: 0 | Refills: 0 | Status: DISCONTINUED | OUTPATIENT
Start: 2018-12-28 | End: 2018-12-28

## 2018-12-28 RX ORDER — SODIUM CHLORIDE 9 MG/ML
1000 INJECTION, SOLUTION INTRAVENOUS
Qty: 0 | Refills: 0 | Status: DISCONTINUED | OUTPATIENT
Start: 2018-12-28 | End: 2019-01-01

## 2018-12-28 RX ORDER — DEXTROSE 50 % IN WATER 50 %
25 SYRINGE (ML) INTRAVENOUS ONCE
Qty: 0 | Refills: 0 | Status: DISCONTINUED | OUTPATIENT
Start: 2018-12-28 | End: 2019-01-01

## 2018-12-28 RX ORDER — ENOXAPARIN SODIUM 100 MG/ML
40 INJECTION SUBCUTANEOUS DAILY
Qty: 0 | Refills: 0 | Status: DISCONTINUED | OUTPATIENT
Start: 2018-12-28 | End: 2019-01-01

## 2018-12-28 RX ORDER — ONDANSETRON 8 MG/1
4 TABLET, FILM COATED ORAL ONCE
Qty: 0 | Refills: 0 | Status: DISCONTINUED | OUTPATIENT
Start: 2018-12-28 | End: 2018-12-28

## 2018-12-28 RX ORDER — MORPHINE SULFATE 50 MG/1
4 CAPSULE, EXTENDED RELEASE ORAL
Qty: 0 | Refills: 0 | Status: DISCONTINUED | OUTPATIENT
Start: 2018-12-28 | End: 2018-12-28

## 2018-12-28 RX ORDER — INSULIN LISPRO 100/ML
VIAL (ML) SUBCUTANEOUS
Qty: 0 | Refills: 0 | Status: DISCONTINUED | OUTPATIENT
Start: 2018-12-28 | End: 2019-01-01

## 2018-12-28 RX ORDER — DEXTROSE 50 % IN WATER 50 %
12.5 SYRINGE (ML) INTRAVENOUS ONCE
Qty: 0 | Refills: 0 | Status: DISCONTINUED | OUTPATIENT
Start: 2018-12-28 | End: 2019-01-01

## 2018-12-28 RX ORDER — INSULIN GLARGINE 100 [IU]/ML
45 INJECTION, SOLUTION SUBCUTANEOUS AT BEDTIME
Qty: 0 | Refills: 0 | Status: DISCONTINUED | OUTPATIENT
Start: 2018-12-28 | End: 2019-01-01

## 2018-12-28 RX ADMIN — CHLORHEXIDINE GLUCONATE 1 APPLICATION(S): 213 SOLUTION TOPICAL at 05:40

## 2018-12-28 RX ADMIN — Medication 5: at 19:00

## 2018-12-28 RX ADMIN — INSULIN GLARGINE 45 UNIT(S): 100 INJECTION, SOLUTION SUBCUTANEOUS at 22:18

## 2018-12-28 RX ADMIN — ATORVASTATIN CALCIUM 40 MILLIGRAM(S): 80 TABLET, FILM COATED ORAL at 22:19

## 2018-12-28 RX ADMIN — SODIUM CHLORIDE 100 MILLILITER(S): 9 INJECTION INTRAMUSCULAR; INTRAVENOUS; SUBCUTANEOUS at 12:48

## 2018-12-28 RX ADMIN — Medication 8 UNIT(S): at 23:08

## 2018-12-28 RX ADMIN — Medication 15 UNIT(S): at 18:59

## 2018-12-28 RX ADMIN — PANTOPRAZOLE SODIUM 40 MILLIGRAM(S): 20 TABLET, DELAYED RELEASE ORAL at 22:19

## 2018-12-28 RX ADMIN — SERTRALINE 50 MILLIGRAM(S): 25 TABLET, FILM COATED ORAL at 22:19

## 2018-12-28 NOTE — PROGRESS NOTE ADULT - SUBJECTIVE AND OBJECTIVE BOX
SUBJECTIVE:    Patient is a 68y old Male who presents with a chief complaint of Left big toe skin tear (27 Dec 2018 20:23)    Currently admitted to medicine with the primary diagnosis of Diabetic foot ulcer     Today is hospital day 1d. Overnight no event. Dec sensation in LLE. Denied LLE pain, CP, SOB, abd pain, urinary issues. Last BM yesterday.    PAST MEDICAL & SURGICAL HISTORY  Dyslipidemia  HTN (hypertension)  DM (diabetes mellitus)  Toe amputation status, right: (2008)        ALLERGIES:  No Known Allergies    MEDICATIONS:  STANDING MEDICATIONS  chlorhexidine 4% Liquid 1 Application(s) Topical <User Schedule>  dextrose 5%. 1000 milliLiter(s) IV Continuous <Continuous>  dextrose 50% Injectable 12.5 Gram(s) IV Push once  dextrose 50% Injectable 25 Gram(s) IV Push once  dextrose 50% Injectable 25 Gram(s) IV Push once  enoxaparin Injectable 40 milliGRAM(s) SubCutaneous daily  influenza   Vaccine 0.5 milliLiter(s) IntraMuscular once  insulin glargine Injectable (LANTUS) 45 Unit(s) SubCutaneous at bedtime  insulin lispro (HumaLOG) corrective regimen sliding scale   SubCutaneous three times a day before meals  insulin lispro Injectable (HumaLOG) 15 Unit(s) SubCutaneous three times a day before meals  lactulose Syrup 20 Gram(s) Oral once    PRN MEDICATIONS  dextrose 40% Gel 15 Gram(s) Oral once PRN  glucagon  Injectable 1 milliGRAM(s) IntraMuscular once PRN    VITALS:   T(F): 98.6  HR: 76  BP: 138/63  RR: 18  SpO2: 99%    LABS:                        11.8   8.63  )-----------( 176      ( 27 Dec 2018 06:07 )             36.0     12-27    141  |  102  |  13  ----------------------------<  275<H>  5.2<H>   |  26  |  1.1    Ca    8.6      27 Dec 2018 06:07  Mg     1.7     12-27    TPro  6.0  /  Alb  3.7  /  TBili  0.7  /  DBili  x   /  AST  16  /  ALT  23  /  AlkPhos  90  12-27    PT/INR - ( 26 Dec 2018 20:44 )   PT: 10.80 sec;   INR: 0.94 ratio         PTT - ( 26 Dec 2018 20:44 )  PTT:30.7 sec          Culture - Blood (collected 26 Dec 2018 19:28)  Source: .Blood Blood  Preliminary Report (28 Dec 2018 02:06):    No growth to date.    Culture - Blood (collected 26 Dec 2018 19:28)  Source: .Blood Blood  Preliminary Report (28 Dec 2018 02:06):    No growth to date.          RADIOLOGY:    PHYSICAL EXAM:  GENERAL: NAD, well-developed, AAOx3  	HEENT: Atraumatic, Normocephalic. EOMI, No JVD  	PULMONARY: Clear to auscultation bilaterally; No wheezing or rales  	CARDIOVASCULAR: Regular rate and rhythm; No murmurs appreciated  	GASTROINTESTINAL: Soft, Nontender, Nondistended; Bowel sounds present  	EXTREMITIES: 1+ PT, DP pulses bilaterally. Right big toe s/p amputation. Left big toe skin removed with clearly demarcated borders. Blacked, hyperkeratotic skin underneath without erythema or edema surrounding the wound. No visible bone, no drainage, no foul odor. No sensation to wound and decreased sensation in all toes. Skin appears to be peeling in rest of toes on left as well. Bleb on 2nd left toe.  NEUROLOGY: No focal weakness. Decreased sensation bilateral LE to ankles    < from: Xray Foot AP + Lateral + Oblique, Left (12.26.18 @ 20:16) >  Plantar second proximal phalangeal 1.2 cm x 0.5 cm metallic   foreign body. No acute fracture or dislocation. Stable degenerative   changes and cortical thickening.. Hallux valgus. Plantar calcaneal 3 mm   spur. Calcaneal Achilles tendon enthesophyte. Soft tissue vascular   calcification.      < end of copied text >    < from: VA Duplex Lower Extrem Arterial, Bilat (12.27.18 @ 14:59) >  Left tibial artery occlusive disease    < end of copied text >

## 2018-12-28 NOTE — CHART NOTE - NSCHARTNOTEFT_GEN_A_CORE
PACU ANESTHESIA ADMISSION NOTE      Procedure: Foreign body removal: Left foot    Post op diagnosis:  Foreign body (FB) in soft tissue      ____  Intubated  TV:______       Rate: ______      FiO2: ______    _x___  Patent Airway    _x___  Full return of protective reflexes    _x___  Full recovery from anesthesia / back to baseline status    Vitals:    See anesthesia record      Mental Status:  ____ Awake   _____ Alert   ____x_ Drowsy   _____ Sedated    Nausea/Vomiting:  _x___  NO       ______Yes,   See Post - Op Orders         Pain Scale (0-10):  __0___    Treatment: _x___ None    ____ See Post - Op/PCA Orders    Post - Operative Fluids:   __x__ Oral   ____ See Post - Op Orders    Plan: Discharge:   ____Home       ___x__Floor     _____Critical Care    _____  Other:_________________    Comments:  No anesthesia issues or complications noted.  Discharge when criteria met.

## 2018-12-28 NOTE — PROGRESS NOTE ADULT - ASSESSMENT
67 yo male with PMHx of DM2, HTN, DLD presenting for one day of left big toe ulceration    # Left big toe wound/ulceration  -XR of left foot- Plantar second proximal phalangeal 1.2 cm x 0.5 cm metallic foreign body. No acute fracture or dislocation. Stable degenerative   changes and cortical thickening. Hallux valgus. Plantar calcaneal 3 mm spur. Calcaneal Achilles tendon enthesophyte. Soft tissue vascular   calcification.  -No drainage on exam, afebrile, no leukocytosis, vitals stable.   -ID- d/c abx, no infection  -Podiatry- OR 12/28 to remove metallic foreign body  -Arterial duplex- Left anterior tibial artery occlusive disease  -Vascular f/u    # Diabetes mellitus uncontrolled  -HbA1C 11.2  -On insulin 70/30 at home 50 units in AM, 40 units in PM, metformin  -c/w Lantus 45 units qHS, 15 units TID, sliding scale inpatient   -Monitor FS, adjust PRN      # HTN  benazepril at home. K 5.2. Hold BP systolic in 130s and 140s today    # GI ppx  -DASH, carb consistent diet    # DVT ppx  -Lovenox subQ    # Disposition  -From home, lives with wife  -Code status: FULL CODE  Home meds confirmed with [facility] today. 67 yo male with PMHx of DM2, HTN, DLD presenting for one day of left big toe ulceration    # Left big toe wound/ulceration  -XR of left foot- Plantar second proximal phalangeal 1.2 cm x 0.5 cm metallic foreign body. No acute fracture or dislocation. Stable degenerative   changes and cortical thickening. Hallux valgus. Plantar calcaneal 3 mm spur. Calcaneal Achilles tendon enthesophyte. Soft tissue vascular   calcification.  -No drainage on exam, afebrile, no leukocytosis, vitals stable.   -ID- d/c abx, no infection  -Podiatry- OR 12/28 to remove metallic foreign body  -Arterial duplex- Left anterior tibial artery occlusive disease  -Vascular f/u- called resident today    # Diabetes mellitus uncontrolled  -HbA1C 11.2  -On insulin 70/30 at home 50 units in AM, 40 units in PM, metformin  -c/w Lantus 45 units qHS, 15 units TID, sliding scale inpatient   -Monitor FS, adjust PRN      # HTN  benazepril at home. K 5.2. Hold. BP systolic in 130s and 140s today    # GI ppx  -DASH, carb consistent diet    # DVT ppx  -Lovenox subQ    # Disposition  -From home, lives with wife  -Code status: FULL CODE  Home meds confirmed with [facility] today.

## 2018-12-28 NOTE — PROGRESS NOTE ADULT - ASSESSMENT
Left toe discoloration with concern of foreign body on imaging going for removal debridement  with left tibial artery oclusive disease  Class 2 risk  via revised goldmann risk assessment , thus moderate risk for intermediate risk procedure   no antibioitics   will await podiatry recommendations post procedure   vascular consult    HTN (hypertension)controlled  DM (diabetes mellitus)  CAPILLARY BLOOD GLUCOSE      POCT Blood Glucose.: 204 mg/dL (28 Dec 2018 08:08)  POCT Blood Glucose.: 128 mg/dL (27 Dec 2018 21:30)  POCT Blood Glucose.: 117 mg/dL (27 Dec 2018 16:52)  POCT Blood Glucose.: 219 mg/dL (27 Dec 2018 12:03)  controlled inpatient , but uncontrolled prior , needs extensive education prior to dc     Hyperkalemia   monitor   awaiting am labs    CKD 2     DW HS and podiatry

## 2018-12-28 NOTE — BRIEF OPERATIVE NOTE - PROCEDURE
<<-----Click on this checkbox to enter Procedure Foreign body removal  12/28/2018  Left foot  Active  KACY

## 2018-12-28 NOTE — PROGRESS NOTE ADULT - SUBJECTIVE AND OBJECTIVE BOX
CHARLOTTE JOSÉ MANUEL  68y  [facility] T2-3A 020 B      Patient is a 68y old  Male who presents with a chief complaint of Left big toe skin tear (27 Dec 2018 20:23)      INTERVAL HPI/OVERNIGHT EVENTS:  npo for or today      REVIEW OF SYSTEMS:  CONSTITUTIONAL: No fever, weight loss, or fatigue  EYES: No eye pain, visual disturbances, or discharge  ENMT:  No difficulty hearing, tinnitus, vertigo; No sinus or throat pain  NECK: No pain or stiffness  BREASTS: No pain, masses, or nipple discharge  RESPIRATORY: No cough, wheezing, chills or hemoptysis; No shortness of breath  CARDIOVASCULAR: No chest pain, palpitations, dizziness, or leg swelling  GASTROINTESTINAL: No abdominal or epigastric pain. No nausea, vomiting, or hematemesis; No diarrhea or constipation. No melena or hematochezia.  GENITOURINARY: No dysuria, frequency, hematuria, or incontinence  NEUROLOGICAL: No headaches, memory loss, loss of strength, numbness, or tremors  SKIN: No itching, burning, rashes, or lesions   LYMPH NODES: No enlarged glands  ENDOCRINE: No heat or cold intolerance; No hair loss  MUSCULOSKELETAL: No joint pain or swelling; No muscle, back, or extremity pain  PSYCHIATRIC: No depression, anxiety, mood swings, or difficulty sleeping  HEME/LYMPH: No easy bruising, or bleeding gums  ALLERY AND IMMUNOLOGIC: No hives or eczema  FAMILY HISTORY:  Family history of ischemic heart disease (IHD) (Father)  Family history of lung cancer (Mother)    T(C): 37 (12-28-18 @ 10:20), Max: 37 (12-28-18 @ 05:00)  HR: 76 (12-28-18 @ 10:20) (75 - 84)  BP: 138/63 (12-28-18 @ 10:20) (138/63 - 148/62)  RR: 18 (12-28-18 @ 10:20) (18 - 18)  SpO2: 99% (12-28-18 @ 10:20) (99% - 99%)  Wt(kg): --Vital Signs Last 24 Hrs  T(C): 37 (28 Dec 2018 10:20), Max: 37 (28 Dec 2018 05:00)  T(F): 98.6 (28 Dec 2018 10:20), Max: 98.6 (28 Dec 2018 05:00)  HR: 76 (28 Dec 2018 10:20) (75 - 84)  BP: 138/63 (28 Dec 2018 10:20) (138/63 - 148/62)  BP(mean): --  RR: 18 (28 Dec 2018 10:20) (18 - 18)  SpO2: 99% (28 Dec 2018 10:20) (99% - 99%)    PHYSICAL EXAM:  GEN Lying in no acute distress  HEENT Pupils equal and reactive to light and accommodationSupple Neck  PULM Clear to auscultation bilaterally  CV s1s2 regular rate and rhythm  GI + bowel sounds nontnender  EXT left toe skin tear with discoloration  PSYCH awake alert and oriented x 3  INTEG No Lesions  NEURO [place]  Consultant(s) Notes Reviewed:  [x ] YES  [ ] NO  Care Discussed with Consultants/Other Providers [ x] YES  [ ] NO    LABS:                            11.8   8.63  )-----------( 176      ( 27 Dec 2018 06:07 )             36.0   12-27    141  |  102  |  13  ----------------------------<  275<H>  5.2<H>   |  26  |  1.1    Ca    8.6      27 Dec 2018 06:07  Mg     1.7     12-27    TPro  6.0  /  Alb  3.7  /  TBili  0.7  /  DBili  x   /  AST  16  /  ALT  23  /  AlkPhos  90  12-27            Culture - Blood (collected 26 Dec 2018 19:28)  Source: .Blood Blood  Preliminary Report (28 Dec 2018 02:06):    No growth to date.    Culture - Blood (collected 26 Dec 2018 19:28)  Source: .Blood Blood  Preliminary Report (28 Dec 2018 02:06):    No growth to date.      chlorhexidine 4% Liquid 1 Application(s) Topical <User Schedule>  dextrose 40% Gel 15 Gram(s) Oral once PRN  dextrose 5%. 1000 milliLiter(s) IV Continuous <Continuous>  dextrose 50% Injectable 12.5 Gram(s) IV Push once  dextrose 50% Injectable 25 Gram(s) IV Push once  dextrose 50% Injectable 25 Gram(s) IV Push once  enoxaparin Injectable 40 milliGRAM(s) SubCutaneous daily  glucagon  Injectable 1 milliGRAM(s) IntraMuscular once PRN  influenza   Vaccine 0.5 milliLiter(s) IntraMuscular once  insulin glargine Injectable (LANTUS) 45 Unit(s) SubCutaneous at bedtime  insulin lispro (HumaLOG) corrective regimen sliding scale   SubCutaneous three times a day before meals  insulin lispro Injectable (HumaLOG) 15 Unit(s) SubCutaneous three times a day before meals  lactulose Syrup 20 Gram(s) Oral once      HEALTH ISSUES - PROBLEM Dx:          Case Discussed with House Staff   45 minutes spent on total encounter; more than 50% of the visit was spent counseling and/or coordinating care by the attending physician.   Spectra [phone]

## 2018-12-29 ENCOUNTER — TRANSCRIPTION ENCOUNTER (OUTPATIENT)
Age: 68
End: 2018-12-29

## 2018-12-29 LAB
ALBUMIN SERPL ELPH-MCNC: 3.9 G/DL — SIGNIFICANT CHANGE UP (ref 3.5–5.2)
ALP SERPL-CCNC: 90 U/L — SIGNIFICANT CHANGE UP (ref 30–115)
ALT FLD-CCNC: 18 U/L — SIGNIFICANT CHANGE UP (ref 0–41)
ANION GAP SERPL CALC-SCNC: 15 MMOL/L — HIGH (ref 7–14)
AST SERPL-CCNC: 14 U/L — SIGNIFICANT CHANGE UP (ref 0–41)
BASOPHILS # BLD AUTO: 0.02 K/UL — SIGNIFICANT CHANGE UP (ref 0–0.2)
BASOPHILS NFR BLD AUTO: 0.3 % — SIGNIFICANT CHANGE UP (ref 0–1)
BILIRUB SERPL-MCNC: 1 MG/DL — SIGNIFICANT CHANGE UP (ref 0.2–1.2)
BUN SERPL-MCNC: 14 MG/DL — SIGNIFICANT CHANGE UP (ref 10–20)
CALCIUM SERPL-MCNC: 9.1 MG/DL — SIGNIFICANT CHANGE UP (ref 8.5–10.1)
CHLORIDE SERPL-SCNC: 100 MMOL/L — SIGNIFICANT CHANGE UP (ref 98–110)
CO2 SERPL-SCNC: 26 MMOL/L — SIGNIFICANT CHANGE UP (ref 17–32)
CREAT SERPL-MCNC: 1 MG/DL — SIGNIFICANT CHANGE UP (ref 0.7–1.5)
EOSINOPHIL # BLD AUTO: 0.03 K/UL — SIGNIFICANT CHANGE UP (ref 0–0.7)
EOSINOPHIL NFR BLD AUTO: 0.4 % — SIGNIFICANT CHANGE UP (ref 0–8)
GLUCOSE BLDC GLUCOMTR-MCNC: 111 MG/DL — HIGH (ref 70–99)
GLUCOSE BLDC GLUCOMTR-MCNC: 198 MG/DL — HIGH (ref 70–99)
GLUCOSE BLDC GLUCOMTR-MCNC: 207 MG/DL — HIGH (ref 70–99)
GLUCOSE BLDC GLUCOMTR-MCNC: 215 MG/DL — HIGH (ref 70–99)
GLUCOSE BLDC GLUCOMTR-MCNC: 76 MG/DL — SIGNIFICANT CHANGE UP (ref 70–99)
GLUCOSE SERPL-MCNC: 141 MG/DL — HIGH (ref 70–99)
HCT VFR BLD CALC: 37.2 % — LOW (ref 42–52)
HGB BLD-MCNC: 12.3 G/DL — LOW (ref 14–18)
IMM GRANULOCYTES NFR BLD AUTO: 0.4 % — HIGH (ref 0.1–0.3)
LYMPHOCYTES # BLD AUTO: 0.78 K/UL — LOW (ref 1.2–3.4)
LYMPHOCYTES # BLD AUTO: 9.8 % — LOW (ref 20.5–51.1)
MAGNESIUM SERPL-MCNC: 1.9 MG/DL — SIGNIFICANT CHANGE UP (ref 1.8–2.4)
MCHC RBC-ENTMCNC: 28.7 PG — SIGNIFICANT CHANGE UP (ref 27–31)
MCHC RBC-ENTMCNC: 33.1 G/DL — SIGNIFICANT CHANGE UP (ref 32–37)
MCV RBC AUTO: 86.9 FL — SIGNIFICANT CHANGE UP (ref 80–94)
MONOCYTES # BLD AUTO: 0.65 K/UL — HIGH (ref 0.1–0.6)
MONOCYTES NFR BLD AUTO: 8.2 % — SIGNIFICANT CHANGE UP (ref 1.7–9.3)
NEUTROPHILS # BLD AUTO: 6.44 K/UL — SIGNIFICANT CHANGE UP (ref 1.4–6.5)
NEUTROPHILS NFR BLD AUTO: 80.9 % — HIGH (ref 42.2–75.2)
NRBC # BLD: 0 /100 WBCS — SIGNIFICANT CHANGE UP (ref 0–0)
PLATELET # BLD AUTO: 203 K/UL — SIGNIFICANT CHANGE UP (ref 130–400)
POTASSIUM SERPL-MCNC: 4.3 MMOL/L — SIGNIFICANT CHANGE UP (ref 3.5–5)
POTASSIUM SERPL-SCNC: 4.3 MMOL/L — SIGNIFICANT CHANGE UP (ref 3.5–5)
PROT SERPL-MCNC: 6.2 G/DL — SIGNIFICANT CHANGE UP (ref 6–8)
RBC # BLD: 4.28 M/UL — LOW (ref 4.7–6.1)
RBC # FLD: 13 % — SIGNIFICANT CHANGE UP (ref 11.5–14.5)
SODIUM SERPL-SCNC: 141 MMOL/L — SIGNIFICANT CHANGE UP (ref 135–146)
WBC # BLD: 7.95 K/UL — SIGNIFICANT CHANGE UP (ref 4.8–10.8)
WBC # FLD AUTO: 7.95 K/UL — SIGNIFICANT CHANGE UP (ref 4.8–10.8)

## 2018-12-29 RX ORDER — POVIDONE-IODINE 5 %
1 AEROSOL (ML) TOPICAL
Qty: 1 | Refills: 0
Start: 2018-12-29 | End: 2019-01-12

## 2018-12-29 RX ORDER — POLYETHYLENE GLYCOL 3350 17 G/17G
17 POWDER, FOR SOLUTION ORAL ONCE
Qty: 0 | Refills: 0 | Status: COMPLETED | OUTPATIENT
Start: 2018-12-29 | End: 2018-12-29

## 2018-12-29 RX ORDER — POVIDONE-IODINE 5 %
1 AEROSOL (ML) TOPICAL
Qty: 1 | Refills: 0 | OUTPATIENT
Start: 2018-12-29 | End: 2019-01-12

## 2018-12-29 RX ORDER — POVIDONE-IODINE 5 %
1 AEROSOL (ML) TOPICAL
Qty: 0 | Refills: 0 | Status: DISCONTINUED | OUTPATIENT
Start: 2018-12-29 | End: 2019-01-01

## 2018-12-29 RX ORDER — LISINOPRIL 2.5 MG/1
40 TABLET ORAL DAILY
Qty: 0 | Refills: 0 | Status: DISCONTINUED | OUTPATIENT
Start: 2018-12-29 | End: 2019-01-01

## 2018-12-29 RX ADMIN — LISINOPRIL 40 MILLIGRAM(S): 2.5 TABLET ORAL at 12:19

## 2018-12-29 RX ADMIN — ATORVASTATIN CALCIUM 40 MILLIGRAM(S): 80 TABLET, FILM COATED ORAL at 21:42

## 2018-12-29 RX ADMIN — INSULIN GLARGINE 45 UNIT(S): 100 INJECTION, SOLUTION SUBCUTANEOUS at 21:43

## 2018-12-29 RX ADMIN — Medication 15 UNIT(S): at 11:52

## 2018-12-29 RX ADMIN — Medication 1: at 17:11

## 2018-12-29 RX ADMIN — PANTOPRAZOLE SODIUM 40 MILLIGRAM(S): 20 TABLET, DELAYED RELEASE ORAL at 05:04

## 2018-12-29 RX ADMIN — ENOXAPARIN SODIUM 40 MILLIGRAM(S): 100 INJECTION SUBCUTANEOUS at 11:15

## 2018-12-29 RX ADMIN — Medication 2: at 11:53

## 2018-12-29 RX ADMIN — Medication 15 UNIT(S): at 17:11

## 2018-12-29 RX ADMIN — SERTRALINE 50 MILLIGRAM(S): 25 TABLET, FILM COATED ORAL at 11:15

## 2018-12-29 NOTE — PROGRESS NOTE ADULT - SUBJECTIVE AND OBJECTIVE BOX
PROGRESS NOTE   Pt seen at bedside during AM rounds. s/p Day 1 Foreign body removal left foot. Dressing clean and intact. Denies n/v/c/d/f/sob      Vital Signs Last 24 Hrs  T(C): 37.1 (29 Dec 2018 05:25), Max: 37.1 (29 Dec 2018 05:25)  T(F): 98.8 (29 Dec 2018 05:25), Max: 98.8 (29 Dec 2018 05:25)  HR: 76 (29 Dec 2018 05:25) (60 - 78)  BP: 139/63 (29 Dec 2018 05:25) (78/46 - 140/64)  BP(mean): --  RR: 18 (29 Dec 2018 05:25) (8 - 18)  SpO2: 100% (28 Dec 2018 13:58) (97% - 100%)                          12.2   7.27  )-----------( 186      ( 28 Dec 2018 09:46 )             35.7               12-28    139  |  101  |  11  ----------------------------<  224<H>  4.5   |  24  |  0.9    Ca    8.9      28 Dec 2018 09:46  Mg     1.9     12-28    TPro  5.9<L>  /  Alb  3.7  /  TBili  0.6  /  DBili  x   /  AST  17  /  ALT  21  /  AlkPhos  80  12-28      PHYSICAL EXAM  LE Focused:  Surgical site plantar aspect of the Left foot,  clean and sutures intact. No dehiscence. No pus. Small redness and swelling due to the procedure. Mild pain on palpation. No signs of infection    < from: VA Duplex Lower Extrem Arterial, Bilat (12.27.18 @ 14:59) >    EXAM:  ART DUPLEX LOWER EXT BILATERAL            PROCEDURE DATE:  12/27/2018            INTERPRETATION:  Clinical History / Reason for exam: This patient is a   68-year-old male with left foot wound. Lower extremity arterial duplex   ultrasound wasperformed to assess for arterial occlusive disease.    Bilateral common femoral, deep femoral, superficial femoral, popliteal,   anterior tibial, posterior tibial and peroneal arteries were visualized.      Right:    The right common femoral, deep femoral, superficial femoral, popliteal,   posterior tibial, peroneal and anterior tibial arteries are patent with   no evidence of hemodynamically significant disease.    Left:    The left common femoral, superficial femoral, popliteal arteries are   patent. The anterior tibial artery demonstrates diminished flow with peak   systolic velocity of 48.5 cm/s. The posterior tibial and peroneal   arteries are patent distally.      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    ICD-10: I70.262                  MP RUIZ M.D., ATTENDING VASCULAR  This document has been electronically signed. Dec 27 2018  5:01PM              < end of copied text >    < from: Xray Foot AP + Lateral + Oblique, Left (12.28.18 @ 14:37) >    EXAM:  XR FOOT COMP MIN 3 VIEWS LT            PROCEDURE DATE:  12/28/2018            INTERPRETATION:  Clinical History / Reason for exam: Foreign body removal    3 views of the left foot.    Comparison: December 26, 2018    Findings:    There is been interval removal of the 1.2 cm metallic foreign body at the   plantar aspect of the forefoot since prior exam. Postsurgical changes   including soft tissue swelling is noted at the plantar forefoot region.    No acute displaced fracture or dislocation.    Degenerative changes of the forefoot, midfoot and hindfoot joints, stable.    Vascular calcifications again noted.    Impression:    Interval removal of metallic foreign body at the plantar aspect of the   forefoot.      VIVIANE MORRELL, ATTENDING RADIOLOGIST  This document has been electronically signed. Dec 28 2018  4:36PM        < end of copied text >    A:   s/p Day 1 Foreign body removal plantar left foot, Stable, No signs of infection  P:  Pt evaluated @ bedside  Dressed with Betadine/DSD  Wound Care orders: Betadine/DSD/kerlix - Daily L foot  WB to the heel left foot on surgical (darco) Shoe  F/U with Dr. Bhatia as o/p in 1 week after D/C  Will Discuss plan with attending. PROGRESS NOTE   Pt seen at bedside during AM rounds. s/p Day 1 Foreign body removal left foot. Dressing clean and intact. Denies n/v/c/d/f/sob      Vital Signs Last 24 Hrs  T(C): 37.1 (29 Dec 2018 05:25), Max: 37.1 (29 Dec 2018 05:25)  T(F): 98.8 (29 Dec 2018 05:25), Max: 98.8 (29 Dec 2018 05:25)  HR: 76 (29 Dec 2018 05:25) (60 - 78)  BP: 139/63 (29 Dec 2018 05:25) (78/46 - 140/64)  BP(mean): --  RR: 18 (29 Dec 2018 05:25) (8 - 18)  SpO2: 100% (28 Dec 2018 13:58) (97% - 100%)                          12.2   7.27  )-----------( 186      ( 28 Dec 2018 09:46 )             35.7               12-28    139  |  101  |  11  ----------------------------<  224<H>  4.5   |  24  |  0.9    Ca    8.9      28 Dec 2018 09:46  Mg     1.9     12-28    TPro  5.9<L>  /  Alb  3.7  /  TBili  0.6  /  DBili  x   /  AST  17  /  ALT  21  /  AlkPhos  80  12-28      PHYSICAL EXAM  LE Focused:  Surgical site plantar aspect of the Left foot,  clean and sutures intact. No dehiscence. No pus. Small redness and swelling due to the procedure. Mild pain on palpation. No signs of infection    < from: VA Duplex Lower Extrem Arterial, Bilat (12.27.18 @ 14:59) >    EXAM:  ART DUPLEX LOWER EXT BILATERAL            PROCEDURE DATE:  12/27/2018            INTERPRETATION:  Clinical History / Reason for exam: This patient is a   68-year-old male with left foot wound. Lower extremity arterial duplex   ultrasound wasperformed to assess for arterial occlusive disease.    Bilateral common femoral, deep femoral, superficial femoral, popliteal,   anterior tibial, posterior tibial and peroneal arteries were visualized.      Right:    The right common femoral, deep femoral, superficial femoral, popliteal,   posterior tibial, peroneal and anterior tibial arteries are patent with   no evidence of hemodynamically significant disease.    Left:    The left common femoral, superficial femoral, popliteal arteries are   patent. The anterior tibial artery demonstrates diminished flow with peak   systolic velocity of 48.5 cm/s. The posterior tibial and peroneal   arteries are patent distally.      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    ICD-10: I70.262                  MP RUIZ M.D., ATTENDING VASCULAR  This document has been electronically signed. Dec 27 2018  5:01PM              < end of copied text >    < from: Xray Foot AP + Lateral + Oblique, Left (12.28.18 @ 14:37) >    EXAM:  XR FOOT COMP MIN 3 VIEWS LT            PROCEDURE DATE:  12/28/2018            INTERPRETATION:  Clinical History / Reason for exam: Foreign body removal    3 views of the left foot.    Comparison: December 26, 2018    Findings:    There is been interval removal of the 1.2 cm metallic foreign body at the   plantar aspect of the forefoot since prior exam. Postsurgical changes   including soft tissue swelling is noted at the plantar forefoot region.    No acute displaced fracture or dislocation.    Degenerative changes of the forefoot, midfoot and hindfoot joints, stable.    Vascular calcifications again noted.    Impression:    Interval removal of metallic foreign body at the plantar aspect of the   forefoot.      VIVIANE MORRELL, ATTENDING RADIOLOGIST  This document has been electronically signed. Dec 28 2018  4:36PM        < end of copied text >    A:   s/p Day 1 Foreign body removal plantar left foot, Stable, No signs of infection  P:  Pt evaluated @ bedside  Dressed with Betadine/DSD  Wound Care orders: Betadine/DSD/kerlix - Daily L foot  WB to the heel left foot on surgical (darco) Shoe  F/U with Dr. Bhatia as o/p in 1 week after D/C  Pt stable for D/C per Podiatry  Plan Discussed with attending.

## 2018-12-29 NOTE — PROGRESS NOTE ADULT - ASSESSMENT
67 yo male with PMHx of DM2, HTN, DLD presenting for one day of left big toe ulceration    # Left big toe wound/ulceration  -XR of left foot- Plantar second proximal phalangeal 1.2 cm x 0.5 cm metallic foreign body. No acute fracture or dislocation. Stable degenerative   changes and cortical thickening. Hallux valgus. Plantar calcaneal 3 mm spur. Calcaneal Achilles tendon enthesophyte. Soft tissue vascular   calcification.  -No drainage on exam, afebrile, no leukocytosis, vitals stable.   -ID- d/c abx, no infection  -Podiatry- OR 12/28 to remove metallic foreign body  Dressed with Betadine/DSD  Wound Care orders: Betadine/DSD/kerlix - Daily L foot  WB to the heel left foot on surgical (darco) Shoe  F/U with Dr. Bahtia as o/p in 1 week after D/C  Pt stable for D/C per   -Arterial duplex- Left anterior tibial artery occlusive disease  -Vascular f/u- called resident today 12/29. Attending will see pt today.    # Diabetes mellitus uncontrolled  -HbA1C 11.2  -On insulin 70/30 at home 50 units in AM, 40 units in PM, metformin  -c/w Lantus 45 units qHS, 15 units TID, sliding scale inpatient   -Monitor FS, adjust PRN      # HTN  benazepril at home. K 5.2. Hold. BP systolic in 130s and 140s today. K 4.3 today    # GI ppx  -DASH, carb consistent diet    # DVT ppx  -Lovenox subQ    #constipation  miralax once  senna, colace    # Disposition  -From home, lives with wife  -Code status: FULL CODE 67 yo male with PMHx of DM2, HTN, DLD presenting for one day of left big toe ulceration    # Left big toe wound/ulceration  -XR of left foot- Plantar second proximal phalangeal 1.2 cm x 0.5 cm metallic foreign body. No acute fracture or dislocation. Stable degenerative   changes and cortical thickening. Hallux valgus. Plantar calcaneal 3 mm spur. Calcaneal Achilles tendon enthesophyte. Soft tissue vascular   calcification.  -ID- d/c abx, no infection  -s/p OR on 12/28 to remove metallic foreign body  Dressed with Betadine/DSD  Wound Care orders: Betadine/DSD/kerlix - Daily L foot  WB to the heel left foot on surgical (darco) Shoe  F/U with Dr. Bhatia as o/p in 1 week after D/C  Pt stable for D/C per podiatry  -Arterial duplex- Left anterior tibial artery occlusive disease  -Vascular f/u- called resident today 12/29. Attending will see pt today.    # Diabetes mellitus uncontrolled  -HbA1C 11.2  -On insulin 70/30 at home 50 units in AM, 40 units in PM, metformin  -c/w Lantus 45 units qHS, 15 units TID, sliding scale inpatient   -Monitor FS, adjust PRN  - outpt f/u with PMD      # HTN  resume ACE-I today    # GI ppx  -DASH, carb consistent diet    # DVT ppx  -Lovenox subQ    #constipation  miralax once  senna, colace    # Disposition  -From home, lives with wife  -Code status: FULL CODE

## 2018-12-29 NOTE — PROGRESS NOTE ADULT - SUBJECTIVE AND OBJECTIVE BOX
SUBJECTIVE:    Patient is a 68y old Male who presents with a chief complaint of Left big toe skin tear (29 Dec 2018 08:45)    Currently admitted to medicine with the primary diagnosis of Diabetic foot ulcer     Today is hospital day 2d. Overnight no event. Denied pain. Report last BM >2 days ago    PAST MEDICAL & SURGICAL HISTORY  Dyslipidemia  HTN (hypertension)  DM (diabetes mellitus)  Toe amputation status, right: (2008)        ALLERGIES:  No Known Allergies    MEDICATIONS:  STANDING MEDICATIONS  atorvastatin 40 milliGRAM(s) Oral at bedtime  chlorhexidine 4% Liquid 1 Application(s) Topical <User Schedule>  dextrose 5%. 1000 milliLiter(s) IV Continuous <Continuous>  dextrose 50% Injectable 12.5 Gram(s) IV Push once  dextrose 50% Injectable 25 Gram(s) IV Push once  dextrose 50% Injectable 25 Gram(s) IV Push once  enoxaparin Injectable 40 milliGRAM(s) SubCutaneous daily  influenza   Vaccine 0.5 milliLiter(s) IntraMuscular once  insulin glargine Injectable (LANTUS) 45 Unit(s) SubCutaneous at bedtime  insulin lispro (HumaLOG) corrective regimen sliding scale   SubCutaneous three times a day before meals  insulin lispro Injectable (HumaLOG) 15 Unit(s) SubCutaneous three times a day before meals  lactulose Syrup 20 Gram(s) Oral once  pantoprazole    Tablet 40 milliGRAM(s) Oral before breakfast  polyethylene glycol 3350 17 Gram(s) Oral once  sertraline 50 milliGRAM(s) Oral daily    PRN MEDICATIONS  dextrose 40% Gel 15 Gram(s) Oral once PRN  glucagon  Injectable 1 milliGRAM(s) IntraMuscular once PRN    VITALS:   T(F): 98.8  HR: 76  BP: 139/63  RR: 18  SpO2: 100%    LABS:                        12.3   7.95  )-----------( 203      ( 29 Dec 2018 08:55 )             37.2     12-29    141  |  100  |  14  ----------------------------<  141<H>  4.3   |  26  |  1.0    Ca    9.1      29 Dec 2018 08:55  Mg     1.9     12-29    TPro  6.2  /  Alb  3.9  /  TBili  1.0  /  DBili  x   /  AST  14  /  ALT  18  /  AlkPhos  90  12-29              Culture - Blood (collected 26 Dec 2018 19:28)  Source: .Blood Blood  Preliminary Report (28 Dec 2018 02:06):    No growth to date.    Culture - Blood (collected 26 Dec 2018 19:28)  Source: .Blood Blood  Preliminary Report (28 Dec 2018 02:06):    No growth to date.          RADIOLOGY:    PHYSICAL EXAM:  GENERAL: NAD, well-developed, AAOx3  	HEENT: Atraumatic, Normocephalic. EOMI, No JVD  	PULMONARY: Clear to auscultation bilaterally; No wheezing or rales  	CARDIOVASCULAR: Regular rate and rhythm; No murmurs appreciated  	GASTROINTESTINAL: Soft, Nontender, Nondistended; Bowel sounds present  	EXTREMITIES: 1+ PT, DP pulses bilaterally. Right big toe s/p amputation. Left big toe skin removed with clearly demarcated borders. Blacked, hyperkeratotic skin underneath without erythema or edema surrounding the wound. No visible bone, no drainage, no foul odor. No sensation to wound and decreased sensation in all toes. Skin appears to be peeling in rest of toes on left as well. Bleb on 2nd left toe. incision from foreign body removal.  NEUROLOGY: No focal weakness. Decreased sensation bilateral LE to ankles

## 2018-12-29 NOTE — DISCHARGE NOTE ADULT - HOSPITAL COURSE
67 yo male with PMHx of DM2, HTN, DLD presenting for one day of left big toe ulceration. Patient states he was removing his sock and his skin on the big toe came off along with it and he noticed the underlying skin was black so decided to come to the ED. Prior to that, he had no ulceration or discoloration. Last week he missed a step and fell however unsure if he hit his feet at the time, otherwise denies trauma. Denies fevers, chills, nausea/vomiting, headache, erythema or swelling around the area. Had some clear fluid drainage at the time of skin tear, denies pus or bleeding. Admits to minimal pain around the area. In the ED, afebrile HR 78 /102 O2sat 99% on room air. Given 1L NS and Zosyn 4.5g x1 dose.  XR left foot showed plantar second proximal phalangeal 1.2 cm x 0.5 cm metallic foreign body. No acute fracture or dislocation. ID rec d/c abx, no infection. On 12/28, metallic foreign body was removed. Podiatry rec dress wound with Betadine/DSD/kerlix daily. WB to the heel left foot on surgical (darco) Shoe. F/U with Dr. Bhatia as o/p in 1 week after D/C. Diabetes mellitus uncontrolled with HbA1C 11.2. Need outpt f/u with PMD  Arterial duplex showed left anterior tibial artery occlusive disease. Per vascular..............  Patient stable and d/c home. 67 yo male with PMHx of DM2, HTN, DLD presenting for one day of left big toe ulceration. Patient states he was removing his sock and his skin on the big toe came off along with it and he noticed the underlying skin was black so decided to come to the ED. Prior to that, he had no ulceration or discoloration. Last week he missed a step and fell however unsure if he hit his feet at the time, otherwise denies trauma. Denies fevers, chills, nausea/vomiting, headache, erythema or swelling around the area. Had some clear fluid drainage at the time of skin tear, denies pus or bleeding. Admits to minimal pain around the area. In the ED, afebrile HR 78 /102 O2sat 99% on room air. Given 1L NS and Zosyn 4.5g x1 dose.  XR left foot showed plantar second proximal phalangeal 1.2 cm x 0.5 cm metallic foreign body. No acute fracture or dislocation. ID rec d/c abx, no infection. On 12/28, metallic foreign body was removed. Podiatry rec dress wound with Betadine/DSD/kerlix daily. WB to the heel left foot on surgical (darco) Shoe. F/U with Dr. Bhatia as o/p in 1 week after D/C. Diabetes mellitus uncontrolled with HbA1C 11.2. Need outpt f/u with PMD  Arterial duplex showed left anterior tibial artery occlusive disease. Per vascular surgery, pt may have outpatient angiogram.  Patient stable and discharged home.

## 2018-12-29 NOTE — DISCHARGE NOTE ADULT - CARE PLAN
Principal Discharge DX:	Foreign body (FB) in soft tissue  Goal:	Treat and prevent complications of foreign body in left foot  Assessment and plan of treatment:	You had a foreign body removed from your left foot.  For your wound, apply Betadine, dry sterile dressing and kerlix daily to left foot.  Weight bearing to the heel left foot on surgical (darco) shoe.  Follow up with podiatry Dr. Bhatia and primary care Dr. Garnett in 1 week.  Your hemoglobin A1C was 11.2. Please follow up with Dr. Garnett to adjust diabetic medications as needed. Please control your diabetes with low carbohydrate diet. Take medication as prescribed.  Return to ED if worsening symptoms including leg pain, blue foot, or weakness.  Secondary Diagnosis:	Peripheral arterial disease  Goal:	Treat and prevent complications of peripheral artery disease  Assessment and plan of treatment:	Follow up with vascular Dr. Espinosa. Principal Discharge DX:	Foreign body (FB) in soft tissue  Goal:	Treat and prevent complications of foreign body in left foot  Assessment and plan of treatment:	You had a foreign body removed from your left foot.  For your wound, apply Betadine, dry sterile dressing and kerlix daily to left foot.  Weight bearing to the heel left foot on surgical (darco) shoe.  Follow up with podiatry Dr. Bhatia and primary care Dr. Garnett in 1 week.  Your hemoglobin A1C was 11.2. Please follow up with Dr. Garnett to adjust diabetic medications as needed. Please control your diabetes with low carbohydrate diet. Take medication as prescribed.  Return to ED if worsening symptoms including leg pain, blue foot, or weakness.  Secondary Diagnosis:	Peripheral arterial disease  Goal:	Treat and prevent complications of peripheral artery disease  Assessment and plan of treatment:	Follow up with vascular Dr. Espinosa for an outpatient angiogram

## 2018-12-29 NOTE — DISCHARGE NOTE ADULT - PLAN OF CARE
Treat and prevent complications of foreign body in left foot You had a foreign body removed from your left foot.  For your wound, apply Betadine, dry sterile dressing and kerlix daily to left foot.  Weight bearing to the heel left foot on surgical (darco) shoe.  Follow up with podiatry Dr. Bhatia and primary care Dr. Garnett in 1 week.  Your hemoglobin A1C was 11.2. Please follow up with Dr. Garnett to adjust diabetic medications as needed. Please control your diabetes with low carbohydrate diet. Take medication as prescribed.  Return to ED if worsening symptoms including leg pain, blue foot, or weakness. Treat and prevent complications of peripheral artery disease Follow up with vascular Dr. Espinosa. Follow up with vascular Dr. Espinosa for an outpatient angiogram

## 2018-12-29 NOTE — DISCHARGE NOTE ADULT - MEDICATION SUMMARY - MEDICATIONS TO TAKE
I will START or STAY ON the medications listed below when I get home from the hospital:    benazepril 40 mg oral tablet  -- 1 tab(s) by mouth once a day  -- Indication: For Hypertension    sertraline 50 mg oral tablet  -- 1 tab(s) by mouth once a day  -- Indication: For mood    Janumet 50 mg-1000 mg oral tablet  -- 1 tab(s) by mouth 2 times a day  -- Indication: For DIABETes    HumuLIN 70/30 KwikPen 70 units-30 units/mL subcutaneous suspension  -- 50 unit(s) subcutaneous once a day before breakfast  -- Indication: For DIABETes    HumuLIN 70/30 subcutaneous suspension  -- 40 unit(s) subcutaneous once a day (at bedtime)  -- Indication: For DIABETes    Crestor 10 mg oral tablet  -- 1 tab(s) by mouth once a day (at bedtime)  -- Indication: For Hyperlipidemia    povidone iodine 10% topical solution  -- Apply on skin to affected area once a day   -- Indication: For foot wound    omeprazole 20 mg oral delayed release capsule  -- 1 cap(s) by mouth once a day  -- Indication: For GERD

## 2018-12-29 NOTE — DISCHARGE NOTE ADULT - CARE PROVIDERS DIRECT ADDRESSES
[email or web address]

## 2018-12-29 NOTE — DISCHARGE NOTE ADULT - PATIENT PORTAL LINK FT
You can access the [facility] Patient Portal, offered by [facility], by registering with the following website: [email or web address]

## 2018-12-29 NOTE — DISCHARGE NOTE ADULT - CARE PROVIDER_API CALL
Royal Bhatia (DPJAIRO), Podiatric Medicine  [address]  [place] [address]  Phone: [phone]  Fax: [phone]    Shane Garnett), Internal Medicine  [address] [place] [address]  Phone: [phone]  Fax: [phone]    Jabari Espinosa), Surgery; Vascular Surgery  [address]  [place] [address]  Phone: [phone]  Fax: [phone]

## 2018-12-30 LAB
ALBUMIN SERPL ELPH-MCNC: 3.6 G/DL — SIGNIFICANT CHANGE UP (ref 3.5–5.2)
ALP SERPL-CCNC: 67 U/L — SIGNIFICANT CHANGE UP (ref 30–115)
ALT FLD-CCNC: 15 U/L — SIGNIFICANT CHANGE UP (ref 0–41)
ANION GAP SERPL CALC-SCNC: 15 MMOL/L — HIGH (ref 7–14)
AST SERPL-CCNC: 13 U/L — SIGNIFICANT CHANGE UP (ref 0–41)
BASOPHILS # BLD AUTO: 0.01 K/UL — SIGNIFICANT CHANGE UP (ref 0–0.2)
BASOPHILS NFR BLD AUTO: 0.2 % — SIGNIFICANT CHANGE UP (ref 0–1)
BILIRUB SERPL-MCNC: 0.7 MG/DL — SIGNIFICANT CHANGE UP (ref 0.2–1.2)
BUN SERPL-MCNC: 15 MG/DL — SIGNIFICANT CHANGE UP (ref 10–20)
CALCIUM SERPL-MCNC: 8.7 MG/DL — SIGNIFICANT CHANGE UP (ref 8.5–10.1)
CHLORIDE SERPL-SCNC: 100 MMOL/L — SIGNIFICANT CHANGE UP (ref 98–110)
CO2 SERPL-SCNC: 25 MMOL/L — SIGNIFICANT CHANGE UP (ref 17–32)
CREAT SERPL-MCNC: 0.9 MG/DL — SIGNIFICANT CHANGE UP (ref 0.7–1.5)
EOSINOPHIL # BLD AUTO: 0.05 K/UL — SIGNIFICANT CHANGE UP (ref 0–0.7)
EOSINOPHIL NFR BLD AUTO: 0.8 % — SIGNIFICANT CHANGE UP (ref 0–8)
GLUCOSE BLDC GLUCOMTR-MCNC: 134 MG/DL — HIGH (ref 70–99)
GLUCOSE BLDC GLUCOMTR-MCNC: 147 MG/DL — HIGH (ref 70–99)
GLUCOSE BLDC GLUCOMTR-MCNC: 241 MG/DL — HIGH (ref 70–99)
GLUCOSE BLDC GLUCOMTR-MCNC: 65 MG/DL — LOW (ref 70–99)
GLUCOSE BLDC GLUCOMTR-MCNC: 82 MG/DL — SIGNIFICANT CHANGE UP (ref 70–99)
GLUCOSE SERPL-MCNC: 169 MG/DL — HIGH (ref 70–99)
HCT VFR BLD CALC: 33.8 % — LOW (ref 42–52)
HGB BLD-MCNC: 11.1 G/DL — LOW (ref 14–18)
IMM GRANULOCYTES NFR BLD AUTO: 0.5 % — HIGH (ref 0.1–0.3)
LYMPHOCYTES # BLD AUTO: 0.98 K/UL — LOW (ref 1.2–3.4)
LYMPHOCYTES # BLD AUTO: 15.1 % — LOW (ref 20.5–51.1)
MAGNESIUM SERPL-MCNC: 1.8 MG/DL — SIGNIFICANT CHANGE UP (ref 1.8–2.4)
MCHC RBC-ENTMCNC: 28.5 PG — SIGNIFICANT CHANGE UP (ref 27–31)
MCHC RBC-ENTMCNC: 32.8 G/DL — SIGNIFICANT CHANGE UP (ref 32–37)
MCV RBC AUTO: 86.9 FL — SIGNIFICANT CHANGE UP (ref 80–94)
MONOCYTES # BLD AUTO: 0.62 K/UL — HIGH (ref 0.1–0.6)
MONOCYTES NFR BLD AUTO: 9.6 % — HIGH (ref 1.7–9.3)
NEUTROPHILS # BLD AUTO: 4.79 K/UL — SIGNIFICANT CHANGE UP (ref 1.4–6.5)
NEUTROPHILS NFR BLD AUTO: 73.8 % — SIGNIFICANT CHANGE UP (ref 42.2–75.2)
NRBC # BLD: 0 /100 WBCS — SIGNIFICANT CHANGE UP (ref 0–0)
PLATELET # BLD AUTO: 180 K/UL — SIGNIFICANT CHANGE UP (ref 130–400)
POTASSIUM SERPL-MCNC: 4.1 MMOL/L — SIGNIFICANT CHANGE UP (ref 3.5–5)
POTASSIUM SERPL-SCNC: 4.1 MMOL/L — SIGNIFICANT CHANGE UP (ref 3.5–5)
PROT SERPL-MCNC: 5.5 G/DL — LOW (ref 6–8)
RBC # BLD: 3.89 M/UL — LOW (ref 4.7–6.1)
RBC # FLD: 12.9 % — SIGNIFICANT CHANGE UP (ref 11.5–14.5)
SODIUM SERPL-SCNC: 140 MMOL/L — SIGNIFICANT CHANGE UP (ref 135–146)
WBC # BLD: 6.48 K/UL — SIGNIFICANT CHANGE UP (ref 4.8–10.8)
WBC # FLD AUTO: 6.48 K/UL — SIGNIFICANT CHANGE UP (ref 4.8–10.8)

## 2018-12-30 RX ADMIN — Medication 1 APPLICATION(S): at 05:31

## 2018-12-30 RX ADMIN — CHLORHEXIDINE GLUCONATE 1 APPLICATION(S): 213 SOLUTION TOPICAL at 05:31

## 2018-12-30 RX ADMIN — INSULIN GLARGINE 45 UNIT(S): 100 INJECTION, SOLUTION SUBCUTANEOUS at 22:06

## 2018-12-30 RX ADMIN — ATORVASTATIN CALCIUM 40 MILLIGRAM(S): 80 TABLET, FILM COATED ORAL at 22:06

## 2018-12-30 RX ADMIN — SERTRALINE 50 MILLIGRAM(S): 25 TABLET, FILM COATED ORAL at 14:50

## 2018-12-30 RX ADMIN — ENOXAPARIN SODIUM 40 MILLIGRAM(S): 100 INJECTION SUBCUTANEOUS at 13:01

## 2018-12-30 RX ADMIN — PANTOPRAZOLE SODIUM 40 MILLIGRAM(S): 20 TABLET, DELAYED RELEASE ORAL at 05:30

## 2018-12-30 RX ADMIN — Medication 15 UNIT(S): at 08:32

## 2018-12-30 RX ADMIN — Medication 15 UNIT(S): at 17:12

## 2018-12-30 NOTE — DIETITIAN INITIAL EVALUATION ADULT. - OTHER INFO
Reason for RD referral (Education). A 69y/o male with pmhx listed below presented with 1 day of left big toe ulceration. Found to have a foreign body in left foot. S/p removal on (12/28). Cleared by podiatry for outpatient f/u in 1 week. 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 The vascular attending will see the patient (12/31) to determine if an angiogram is needed. Skin is intact. (Vinnie Score-18).

## 2018-12-30 NOTE — DIETITIAN INITIAL EVALUATION ADULT. - PHYSICAL APPEARANCE
overweight/BMI-28; Based on nutrition focused physical examination, the patient appears well nourished with no physical signs of muscle and subcutaneous fat wasting.

## 2018-12-30 NOTE — DIETITIAN INITIAL EVALUATION ADULT. - PERTINENT LABORATORY DATA
(12/29) Na-141, K-4.3, CL-100, BUN-14, Cr-1, Glucose-141mg/dL, POC-82, 111, 198, 215, 76, 207, 337, 388mg/dL, Ca-9.1, H/H-12.3/37.2

## 2018-12-30 NOTE — DIETITIAN INITIAL EVALUATION ADULT. - ENERGY NEEDS
Estimated Calorie Needs: MSJ-1,746 x AF 1.1-1.2=1,921-2,095kcal/day   Estimated Protein Needs: 94-104grams/day (1-1.1grams/kg of admit weight)  Estimated Fluid Needs: 1,921-2,095mL/day (1mL/kcal)

## 2018-12-30 NOTE — PROGRESS NOTE ADULT - SUBJECTIVE AND OBJECTIVE BOX
JOSÉ MANUEL PORTER  68y Male    INTERVAL HPI/OVERNIGHT EVENTS:    No complaints. Pt seen with vascular surgery fellow who states that Dr. Espinosa will see the pt tomorrow.    T(F): 99.2 (12-30-18 @ 06:00), Max: 99.2 (12-30-18 @ 06:00)  HR: 74 (12-30-18 @ 06:00) (71 - 74)  BP: 103/54 (12-30-18 @ 06:00) (103/54 - 138/68)  RR: 16 (12-30-18 @ 06:00) (16 - 16)  SpO2: --  I&O's Summary    CAPILLARY BLOOD GLUCOSE      POCT Blood Glucose.: 65 mg/dL (30 Dec 2018 12:08)  POCT Blood Glucose.: 82 mg/dL (30 Dec 2018 07:54)  POCT Blood Glucose.: 111 mg/dL (29 Dec 2018 21:36)  POCT Blood Glucose.: 198 mg/dL (29 Dec 2018 16:46)        PHYSICAL EXAM:  GENERAL: NAD  HEAD:  Normocephalic  EYES:  conjunctiva and sclera clear  ENMT: Moist mucous membranes  NECK: Supple  NERVOUS SYSTEM:  Alert & Oriented X3, Good concentration  CHEST/LUNG: Clear to percussion bilaterally; No rales, rhonchi, wheezing  HEART: Regular rate and rhythm; No murmurs  ABDOMEN: Soft, Nontender, Nondistended; Bowel sounds present  EXTREMITIES:   Left foot with dressing  plantar area with stitches in place  left hallux with dry gangrene, discolored toes, foot not cold      Consultant(s) Notes Reviewed:  [x ] YES  [ ] NO  Care Discussed with Consultants/Other Providers [ x] YES  [ ] NO    MEDICATIONS  (STANDING):  atorvastatin 40 milliGRAM(s) Oral at bedtime  chlorhexidine 4% Liquid 1 Application(s) Topical <User Schedule>  dextrose 5%. 1000 milliLiter(s) (50 mL/Hr) IV Continuous <Continuous>  dextrose 50% Injectable 12.5 Gram(s) IV Push once  dextrose 50% Injectable 25 Gram(s) IV Push once  dextrose 50% Injectable 25 Gram(s) IV Push once  enoxaparin Injectable 40 milliGRAM(s) SubCutaneous daily  influenza   Vaccine 0.5 milliLiter(s) IntraMuscular once  insulin glargine Injectable (LANTUS) 45 Unit(s) SubCutaneous at bedtime  insulin lispro (HumaLOG) corrective regimen sliding scale   SubCutaneous three times a day before meals  insulin lispro Injectable (HumaLOG) 15 Unit(s) SubCutaneous three times a day before meals  lactulose Syrup 20 Gram(s) Oral once  lisinopril 40 milliGRAM(s) Oral daily  pantoprazole    Tablet 40 milliGRAM(s) Oral before breakfast  povidone iodine 10% Solution 1 Application(s) Topical <User Schedule>  sertraline 50 milliGRAM(s) Oral daily    MEDICATIONS  (PRN):  dextrose 40% Gel 15 Gram(s) Oral once PRN Blood Glucose LESS THAN 70 milliGRAM(s)/deciliter  glucagon  Injectable 1 milliGRAM(s) IntraMuscular once PRN Glucose LESS THAN 70 milligrams/deciliter      LABS:                        11.1   6.48  )-----------( 180      ( 30 Dec 2018 09:05 )             33.8     12-30    140  |  100  |  15  ----------------------------<  169<H>  4.1   |  25  |  0.9    Ca    8.7      30 Dec 2018 09:05  Mg     1.8     12-30    TPro  5.5<L>  /  Alb  3.6  /  TBili  0.7  /  DBili  x   /  AST  13  /  ALT  15  /  AlkPhos  67  12-30            Care discussed with pt

## 2018-12-30 NOTE — PROGRESS NOTE ADULT - ASSESSMENT
69 yo man with PMH of DM type 2, HTN and dyslipidemia presented with 1 day of left big toe ulceration    1. Foreign body in left foot s/p removal on 12/28  cleared by Podiatry for outpt f/u in 1 week  wound care per Podiatry  no abx needed  WB to the heel left foot on surgical (darco) Shoe    2. Left anterior tibial artery disease - pt with discolored toes and dry gangrene of hallux  vascular attending to see pt tomorrow to determine if inpt angiogram is needed    3. Diabetes mellitus uncontrolled at times  HbA1C 11.2  On insulin 70/30 at home 50 units in AM, 40 units in PM, metformin  c/w Lantus 45 units qHS, 15 units qAC  Monitor FS  outpt f/u with PMD    4. HTN  resumed ACE-I    5. DVT ppx  Lovenox subQ    6. Disposition- home when cleared by Vascular surgery

## 2018-12-30 NOTE — DIETITIAN INITIAL EVALUATION ADULT. - ADHERENCE
The patient reports following a regular diet at home; consumed two meals at 100%; took a One A Day supplement daily./good

## 2018-12-31 LAB
GLUCOSE BLDC GLUCOMTR-MCNC: 162 MG/DL — HIGH (ref 70–99)
GLUCOSE BLDC GLUCOMTR-MCNC: 187 MG/DL — HIGH (ref 70–99)
GLUCOSE BLDC GLUCOMTR-MCNC: 266 MG/DL — HIGH (ref 70–99)
SURGICAL PATHOLOGY STUDY: SIGNIFICANT CHANGE UP

## 2018-12-31 RX ADMIN — ENOXAPARIN SODIUM 40 MILLIGRAM(S): 100 INJECTION SUBCUTANEOUS at 12:08

## 2018-12-31 RX ADMIN — Medication 3: at 17:36

## 2018-12-31 RX ADMIN — Medication 15 UNIT(S): at 17:36

## 2018-12-31 RX ADMIN — PANTOPRAZOLE SODIUM 40 MILLIGRAM(S): 20 TABLET, DELAYED RELEASE ORAL at 05:49

## 2018-12-31 RX ADMIN — Medication 15 UNIT(S): at 12:08

## 2018-12-31 RX ADMIN — LISINOPRIL 40 MILLIGRAM(S): 2.5 TABLET ORAL at 05:49

## 2018-12-31 RX ADMIN — SERTRALINE 50 MILLIGRAM(S): 25 TABLET, FILM COATED ORAL at 12:09

## 2018-12-31 RX ADMIN — Medication 1: at 12:08

## 2018-12-31 RX ADMIN — Medication 1 APPLICATION(S): at 05:49

## 2018-12-31 RX ADMIN — Medication 15 UNIT(S): at 08:46

## 2018-12-31 RX ADMIN — INSULIN GLARGINE 45 UNIT(S): 100 INJECTION, SOLUTION SUBCUTANEOUS at 21:40

## 2018-12-31 RX ADMIN — Medication 1: at 08:47

## 2018-12-31 RX ADMIN — ATORVASTATIN CALCIUM 40 MILLIGRAM(S): 80 TABLET, FILM COATED ORAL at 21:40

## 2018-12-31 RX ADMIN — CHLORHEXIDINE GLUCONATE 1 APPLICATION(S): 213 SOLUTION TOPICAL at 05:48

## 2018-12-31 NOTE — PROGRESS NOTE ADULT - SUBJECTIVE AND OBJECTIVE BOX
JOSÉ MANUEL PORTER  68y Male    INTERVAL HPI/OVERNIGHT EVENTS:    No complaints. Awaiting vascular surgery eval.    T(F): 97.1 (12-31-18 @ 12:22), Max: 98.5 (12-31-18 @ 06:52)  HR: 77 (12-31-18 @ 12:22) (67 - 77)  BP: 131/64 (12-31-18 @ 12:22) (131/64 - 142/67)  RR: 19 (12-31-18 @ 12:22) (18 - 19)  SpO2: --  I&O's Summary    CAPILLARY BLOOD GLUCOSE      POCT Blood Glucose.: 162 mg/dL (31 Dec 2018 12:00)  POCT Blood Glucose.: 187 mg/dL (31 Dec 2018 08:05)  POCT Blood Glucose.: 241 mg/dL (30 Dec 2018 21:41)  POCT Blood Glucose.: 147 mg/dL (30 Dec 2018 16:49)  POCT Blood Glucose.: 134 mg/dL (30 Dec 2018 15:00)        PHYSICAL EXAM:  GENERAL: NAD  HEAD:  Normocephalic  EYES:  conjunctiva and sclera clear  ENMT: Moist mucous membranes  NECK: Supple  NERVOUS SYSTEM:  Alert & Oriented X3, Good concentration  CHEST/LUNG: Clear to percussion bilaterally; No rales, rhonchi, wheezing  HEART: Regular rate and rhythm; No murmurs  ABDOMEN: Soft, Nontender, Nondistended  EXTREMITIES:   Right foot covered in betadine and dressing       Consultant(s) Notes Reviewed:  [x ] YES  [ ] NO  Care Discussed with Consultants/Other Providers [ x] YES  [ ] NO    MEDICATIONS  (STANDING):  atorvastatin 40 milliGRAM(s) Oral at bedtime  chlorhexidine 4% Liquid 1 Application(s) Topical <User Schedule>  dextrose 5%. 1000 milliLiter(s) (50 mL/Hr) IV Continuous <Continuous>  dextrose 50% Injectable 12.5 Gram(s) IV Push once  dextrose 50% Injectable 25 Gram(s) IV Push once  dextrose 50% Injectable 25 Gram(s) IV Push once  enoxaparin Injectable 40 milliGRAM(s) SubCutaneous daily  influenza   Vaccine 0.5 milliLiter(s) IntraMuscular once  insulin glargine Injectable (LANTUS) 45 Unit(s) SubCutaneous at bedtime  insulin lispro (HumaLOG) corrective regimen sliding scale   SubCutaneous three times a day before meals  insulin lispro Injectable (HumaLOG) 15 Unit(s) SubCutaneous three times a day before meals  lactulose Syrup 20 Gram(s) Oral once  lisinopril 40 milliGRAM(s) Oral daily  pantoprazole    Tablet 40 milliGRAM(s) Oral before breakfast  povidone iodine 10% Solution 1 Application(s) Topical <User Schedule>  sertraline 50 milliGRAM(s) Oral daily    MEDICATIONS  (PRN):  dextrose 40% Gel 15 Gram(s) Oral once PRN Blood Glucose LESS THAN 70 milliGRAM(s)/deciliter  glucagon  Injectable 1 milliGRAM(s) IntraMuscular once PRN Glucose LESS THAN 70 milligrams/deciliter      LABS:                        11.1   6.48  )-----------( 180      ( 30 Dec 2018 09:05 )             33.8     12-30    140  |  100  |  15  ----------------------------<  169<H>  4.1   |  25  |  0.9    Ca    8.7      30 Dec 2018 09:05  Mg     1.8     12-30    TPro  5.5<L>  /  Alb  3.6  /  TBili  0.7  /  DBili  x   /  AST  13  /  ALT  15  /  AlkPhos  67  12-30          Case discussed with resident    Care discussed with pt

## 2018-12-31 NOTE — PROGRESS NOTE ADULT - ASSESSMENT
67 yo male with PMHx of DM2, HTN, DLD presenting for one day of left big toe ulceration    # Left big toe wound/ulceration  -XR of left foot- Plantar second proximal phalangeal 1.2 cm x 0.5 cm metallic foreign body. No acute fracture or dislocation. Stable degenerative   changes and cortical thickening. Hallux valgus. Plantar calcaneal 3 mm spur. Calcaneal Achilles tendon enthesophyte. Soft tissue vascular   calcification.  -ID- d/c abx, no infection  -s/p OR on 12/28 to remove metallic foreign body  Dressed with Betadine/DSD  Wound Care orders: Betadine/DSD/kerlix - Daily L foot  WB to the heel left foot on surgical (darco) Shoe  F/U with Dr. Bhatia as o/p in 1 week after D/C  Pt stable for D/C per podiatry  -Arterial duplex- Left anterior tibial artery occlusive disease  -Can be discharged when cleared by Vascular (Dr. Espinosa); f/u [phone]    # Diabetes mellitus uncontrolled  -HbA1C 11.2  -On insulin 70/30 at home 50 units in AM, 40 units in PM, metformin  -c/w Lantus 45 units qHS, 15 units TID, sliding scale inpatient   -Monitor FS, adjust PRN  - outpt f/u with PMD      # HTN  resume ACE-I today    # GI ppx  -DASH, carb consistent diet    # DVT ppx  -Lovenox subQ    #constipation  -miralax once  -c/w senna, colace    # Disposition  -From home, lives with wife  -Code status: FULL CODE

## 2018-12-31 NOTE — PROGRESS NOTE ADULT - ASSESSMENT
69 yo man with PMH of DM type 2, HTN and dyslipidemia presented with 1 day of left big toe ulceration    1. Foreign body in left foot s/p removal on 12/28  cleared by Podiatry for outpt f/u in 1 week  wound care per Podiatry  no abx needed  WB to the heel left foot with surgical (darco) Shoe    2. Left anterior tibial artery disease - pt with discolored toes 2 -5 and dry gangrene of hallux  vascular attending to see pt today to determine if inpt angiogram is needed    3. Diabetes mellitus uncontrolled at times - better controlled today  HbA1C 11.2  On insulin 70/30 at home 50 units in AM, 40 units in PM, metformin  c/w Lantus 45 units qHS, 15 units qAC  Monitor FS  outpt f/u with PMD    4. HTN  resumed ACE-I    5. DVT ppx  Lovenox subQ    6. Disposition- home when cleared by Vascular surgery

## 2018-12-31 NOTE — PROGRESS NOTE ADULT - ATTENDING COMMENTS
Post op surgical site is well coapted and sutures intact-->pt ok to d/c from podiatric stand point with outpatient follow up. Post op instructions: heel weightbearing. Left digits 1-5 with cyanotic changes; vascular recommendations appreciated.
Pt seen and examined independently earlier today. He had no complaints.   left foot with dressing (not removed by me).  s/p foreign body removal yesterday.   No abx needed per ID or podiatry.  Wound care per podiatry.  Pt can go home if cleared by vascular surgery for outpt f/u (has left PVD).  DM with neuropathy - uncontrolled - pt on insulin  outpt f/u with PMD, Podiatry and vascular surgery.  Resume ACE-I on discharge.    I discussed the case with the resident and I reviewed her note. Agree with the physical exam, assessment and plan as outlined except as noted above.

## 2018-12-31 NOTE — PROGRESS NOTE ADULT - SUBJECTIVE AND OBJECTIVE BOX
PROGRESS NOTE   Pt seen at bedside during AM rounds with attending . s/p Day 3 Foreign body removal left foot. Dressing clean and intact. Denies n/v/c/d/f/sob    Vital Signs Last 24 Hrs  T(C): 36.9 (31 Dec 2018 06:52), Max: 36.9 (31 Dec 2018 06:52)  T(F): 98.5 (31 Dec 2018 06:52), Max: 98.5 (31 Dec 2018 06:52)  HR: 71 (31 Dec 2018 06:52) (67 - 71)  BP: 135/65 (31 Dec 2018 06:52) (135/65 - 142/67)  BP(mean): --  RR: 18 (31 Dec 2018 06:52) (18 - 18)  SpO2: --                          11.1   6.48  )-----------( 180      ( 30 Dec 2018 09:05 )             33.8               12-30    140  |  100  |  15  ----------------------------<  169<H>  4.1   |  25  |  0.9    Ca    8.7      30 Dec 2018 09:05  Mg     1.8     12-30    TPro  5.5<L>  /  Alb  3.6  /  TBili  0.7  /  DBili  x   /  AST  13  /  ALT  15  /  AlkPhos  67  12-30      PHYSICAL EXAM  LE Focused: Surgical site plantar aspect of the Left foot,  clean and sutures intact. No dehiscence. No pus. No redness or swelling. No signs of infection  Toes 2-5 Dusky blisters.   No palpable pules.     A:   s/p Day 3 Foreign body removal plantar left foot, Stable, No signs of infection  P:  Pt evaluated @ bedside  Dressed with Betadine/DSD  Wound Care orders: Betadine/DSD/kerlix - Daily L foot covering the toes and interdigital spaces  WB to the heel left foot on surgical (darco) Shoe  F/U with Dr. Bhatia as o/p in 1 week after D/C  Pt stable for D/C per Podiatry  Plan Discussed with attending.

## 2019-01-01 VITALS
SYSTOLIC BLOOD PRESSURE: 120 MMHG | TEMPERATURE: 97 F | DIASTOLIC BLOOD PRESSURE: 74 MMHG | RESPIRATION RATE: 18 BRPM | HEART RATE: 64 BPM

## 2019-01-01 LAB
CULTURE RESULTS: SIGNIFICANT CHANGE UP
CULTURE RESULTS: SIGNIFICANT CHANGE UP
GLUCOSE BLDC GLUCOMTR-MCNC: 170 MG/DL — HIGH (ref 70–99)
GLUCOSE BLDC GLUCOMTR-MCNC: 227 MG/DL — HIGH (ref 70–99)
SPECIMEN SOURCE: SIGNIFICANT CHANGE UP
SPECIMEN SOURCE: SIGNIFICANT CHANGE UP

## 2019-01-01 RX ADMIN — ENOXAPARIN SODIUM 40 MILLIGRAM(S): 100 INJECTION SUBCUTANEOUS at 12:20

## 2019-01-01 RX ADMIN — SERTRALINE 50 MILLIGRAM(S): 25 TABLET, FILM COATED ORAL at 12:20

## 2019-01-01 RX ADMIN — Medication 15 UNIT(S): at 08:20

## 2019-01-01 RX ADMIN — Medication 15 UNIT(S): at 12:19

## 2019-01-01 RX ADMIN — Medication 1: at 12:19

## 2019-01-01 RX ADMIN — Medication 1 APPLICATION(S): at 06:00

## 2019-01-01 RX ADMIN — LISINOPRIL 40 MILLIGRAM(S): 2.5 TABLET ORAL at 05:59

## 2019-01-01 RX ADMIN — Medication 2: at 08:21

## 2019-01-01 RX ADMIN — PANTOPRAZOLE SODIUM 40 MILLIGRAM(S): 20 TABLET, DELAYED RELEASE ORAL at 06:00

## 2019-01-01 NOTE — PROGRESS NOTE ADULT - PROVIDER SPECIALTY LIST ADULT
Hospitalist
Internal Medicine
Podiatry
Hospitalist

## 2019-01-01 NOTE — PROGRESS NOTE ADULT - ASSESSMENT
67 yo man with PMH of DM type 2, HTN and dyslipidemia presented with 1 day of left big toe ulceration    1. Foreign body in left foot s/p removal on 12/28  cleared by Podiatry for outpt f/u in 1 week  wound care per Podiatry  no abx needed  WB to the heel left foot with surgical (darco) Shoe    2. Left anterior tibial artery disease - pt with discolored toes 2 -5 and dry gangrene of hallux  awaiting vascular attending recommendations re: if inpt angiogram is needed    3. Diabetes mellitus uncontrolled at times   HbA1C 11.2  On insulin 70/30 at home 50 units in AM, 40 units in PM, metformin  c/w Lantus 45 units qHS, 15 units qAC  Monitor FS  outpt f/u with PMD    4. HTN  resumed ACE-I    5. DVT ppx  Lovenox subQ    6. Disposition- home when cleared by Vascular surgery 67 yo man with PMH of DM type 2, HTN and dyslipidemia presented with 1 day of left big toe ulceration    1. Foreign body in left foot s/p removal on 12/28  cleared by Podiatry for outpt f/u in 1 week  wound care per Podiatry  no abx needed  WB to the heel left foot with surgical (darco) Shoe    2. Left anterior tibial artery disease - pt with discolored toes 2 -5 and dry gangrene of hallux  awaiting vascular attending recommendations re: if inpt angiogram is needed    3. Diabetes mellitus uncontrolled at times   HbA1C 11.2  On insulin 70/30 at home 50 units in AM, 40 units in PM, metformin  c/w Lantus 45 units qHS, 15 units qAC  Monitor FS  outpt f/u with PMD    4. HTN  resumed ACE-I    5. DVT ppx  Lovenox subQ    6. Disposition- home when cleared by Vascular surgery    Addendum: spoke to Vascular who states that pt can f/u as outpt for angiogram since the earliest inpt angiogram can only be scheduled for Friday and there is no guarantee that it may be postponed until next week.   Spoke to the pt and his family and they are comfortable going home today and following up as an outpt.   Pt is noncompliant with his DM management per his wife - doesn't check FS regularly and doesn't take insulin as prescribed.   The importance of good DM control and the consequences of uncontrolled DM was discussed with the pt.  Pt discharged home today.

## 2019-01-01 NOTE — PROGRESS NOTE ADULT - REASON FOR ADMISSION
Left big toe skin tear

## 2019-01-01 NOTE — PROGRESS NOTE ADULT - SUBJECTIVE AND OBJECTIVE BOX
JOSÉ MANUEL PORTER  68y Male    INTERVAL HPI/OVERNIGHT EVENTS:    No complaints. Called vascular resident today.     T(F): 97 (01-01-19 @ 12:16), Max: 99 (01-01-19 @ 05:46)  HR: 64 (01-01-19 @ 12:16) (64 - 75)  BP: 120/74 (01-01-19 @ 12:16) (113/74 - 157/75)  RR: 18 (01-01-19 @ 12:16) (18 - 18)  SpO2: 98% (12-31-18 @ 20:04) (98% - 98%)  I&O's Summary    CAPILLARY BLOOD GLUCOSE      POCT Blood Glucose.: 170 mg/dL (01 Jan 2019 11:38)  POCT Blood Glucose.: 227 mg/dL (01 Jan 2019 07:28)  POCT Blood Glucose.: 266 mg/dL (31 Dec 2018 21:38)        PHYSICAL EXAM:  GENERAL: NAD  HEAD:  Normocephalic  EYES:  conjunctiva and sclera clear  ENMT: Moist mucous membranes  NECK: Supple  NERVOUS SYSTEM:  Alert & Oriented X3, Good concentration  CHEST/LUNG: Clear to percussion bilaterally  HEART: Regular rate and rhythm  ABDOMEN: Soft, Nontender, Nondistended  EXTREMITIES:   Left foot with dressing      Consultant(s) Notes Reviewed:  [x ] YES  [ ] NO  Care Discussed with Consultants/Other Providers [ x] YES  [ ] NO    MEDICATIONS  (STANDING):  atorvastatin 40 milliGRAM(s) Oral at bedtime  chlorhexidine 4% Liquid 1 Application(s) Topical <User Schedule>  dextrose 5%. 1000 milliLiter(s) (50 mL/Hr) IV Continuous <Continuous>  dextrose 50% Injectable 12.5 Gram(s) IV Push once  dextrose 50% Injectable 25 Gram(s) IV Push once  dextrose 50% Injectable 25 Gram(s) IV Push once  enoxaparin Injectable 40 milliGRAM(s) SubCutaneous daily  influenza   Vaccine 0.5 milliLiter(s) IntraMuscular once  insulin glargine Injectable (LANTUS) 45 Unit(s) SubCutaneous at bedtime  insulin lispro (HumaLOG) corrective regimen sliding scale   SubCutaneous three times a day before meals  insulin lispro Injectable (HumaLOG) 15 Unit(s) SubCutaneous three times a day before meals  lactulose Syrup 20 Gram(s) Oral once  lisinopril 40 milliGRAM(s) Oral daily  pantoprazole    Tablet 40 milliGRAM(s) Oral before breakfast  povidone iodine 10% Solution 1 Application(s) Topical <User Schedule>  sertraline 50 milliGRAM(s) Oral daily    MEDICATIONS  (PRN):  dextrose 40% Gel 15 Gram(s) Oral once PRN Blood Glucose LESS THAN 70 milliGRAM(s)/deciliter  glucagon  Injectable 1 milliGRAM(s) IntraMuscular once PRN Glucose LESS THAN 70 milligrams/deciliter        Care discussed with pt

## 2019-01-02 ENCOUNTER — INBOUND DOCUMENT (OUTPATIENT)
Age: 69
End: 2019-01-02

## 2019-01-02 LAB — GLUCOSE BLDC GLUCOMTR-MCNC: 265 MG/DL — HIGH (ref 70–99)

## 2019-01-03 PROBLEM — E78.5 HYPERLIPIDEMIA, UNSPECIFIED: Chronic | Status: ACTIVE | Noted: 2018-12-27

## 2019-01-03 PROBLEM — I10 ESSENTIAL (PRIMARY) HYPERTENSION: Chronic | Status: ACTIVE | Noted: 2018-12-27

## 2019-01-04 ENCOUNTER — OUTPATIENT (OUTPATIENT)
Dept: OUTPATIENT SERVICES | Facility: HOSPITAL | Age: 69
LOS: 1 days | Discharge: HOME | End: 2019-01-04

## 2019-01-04 ENCOUNTER — APPOINTMENT (OUTPATIENT)
Dept: PODIATRY | Facility: CLINIC | Age: 69
End: 2019-01-04
Payer: MEDICARE

## 2019-01-04 VITALS
DIASTOLIC BLOOD PRESSURE: 70 MMHG | SYSTOLIC BLOOD PRESSURE: 110 MMHG | BODY MASS INDEX: 29.4 KG/M2 | HEART RATE: 80 BPM | WEIGHT: 210 LBS | HEIGHT: 71 IN

## 2019-01-04 DIAGNOSIS — Z89.421 ACQUIRED ABSENCE OF OTHER RIGHT TOE(S): Chronic | ICD-10-CM

## 2019-01-04 DIAGNOSIS — L98.8 OTHER SPECIFIED DISORDERS OF THE SKIN AND SUBCUTANEOUS TISSUE: ICD-10-CM

## 2019-01-04 PROCEDURE — 99024 POSTOP FOLLOW-UP VISIT: CPT

## 2019-01-05 DIAGNOSIS — Z91.81 HISTORY OF FALLING: ICD-10-CM

## 2019-01-05 DIAGNOSIS — E11.22 TYPE 2 DIABETES MELLITUS WITH DIABETIC CHRONIC KIDNEY DISEASE: ICD-10-CM

## 2019-01-05 DIAGNOSIS — E87.5 HYPERKALEMIA: ICD-10-CM

## 2019-01-05 DIAGNOSIS — I12.9 HYPERTENSIVE CHRONIC KIDNEY DISEASE WITH STAGE 1 THROUGH STAGE 4 CHRONIC KIDNEY DISEASE, OR UNSPECIFIED CHRONIC KIDNEY DISEASE: ICD-10-CM
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
## 2019-01-05 DIAGNOSIS — E11.40 TYPE 2 DIABETES MELLITUS WITH DIABETIC NEUROPATHY, UNSPECIFIED: ICD-10-CM

## 2019-01-05 DIAGNOSIS — K59.00 CONSTIPATION, UNSPECIFIED: ICD-10-CM

## 2019-01-05 DIAGNOSIS — Z79.4 LONG TERM (CURRENT) USE OF INSULIN: ICD-10-CM

## 2019-01-05 DIAGNOSIS — E11.52 TYPE 2 DIABETES MELLITUS WITH DIABETIC PERIPHERAL ANGIOPATHY WITH GANGRENE: ICD-10-CM

## 2019-01-05 DIAGNOSIS — Z28.21 IMMUNIZATION NOT CARRIED OUT BECAUSE OF PATIENT REFUSAL: ICD-10-CM

## 2019-01-05 DIAGNOSIS — M79.5 RESIDUAL FOREIGN BODY IN SOFT TISSUE: ICD-10-CM

## 2019-01-05 DIAGNOSIS — Z91.19 PATIENT'S NONCOMPLIANCE WITH OTHER MEDICAL TREATMENT AND REGIMEN: ICD-10-CM

## 2019-01-05 DIAGNOSIS — S90.425A BLISTER (NONTHERMAL), LEFT LESSER TOE(S), INITIAL ENCOUNTER: ICD-10-CM

## 2019-01-05 DIAGNOSIS — B35.1 TINEA UNGUIUM: ICD-10-CM

## 2019-01-05 DIAGNOSIS — L97.529 NON-PRESSURE CHRONIC ULCER OF OTHER PART OF LEFT FOOT WITH UNSPECIFIED SEVERITY: ICD-10-CM

## 2019-01-05 DIAGNOSIS — Z89.421 ACQUIRED ABSENCE OF OTHER RIGHT TOE(S): ICD-10-CM

## 2019-01-05 DIAGNOSIS — E78.5 HYPERLIPIDEMIA, UNSPECIFIED: ICD-10-CM

## 2019-01-05 DIAGNOSIS — E11.621 TYPE 2 DIABETES MELLITUS WITH FOOT ULCER: ICD-10-CM

## 2019-01-05 DIAGNOSIS — Z18.10 RETAINED METAL FRAGMENTS, UNSPECIFIED: ICD-10-CM
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
## 2019-01-05 DIAGNOSIS — N18.2 CHRONIC KIDNEY DISEASE, STAGE 2 (MILD): ICD-10-CM

## 2019-01-08 ENCOUNTER — APPOINTMENT (OUTPATIENT)
Dept: PODIATRY | Facility: CLINIC | Age: 69
End: 2019-01-08

## 2019-01-08 DIAGNOSIS — I96 GANGRENE, NOT ELSEWHERE CLASSIFIED: ICD-10-CM

## 2019-01-08 DIAGNOSIS — R21 RASH AND OTHER NONSPECIFIC SKIN ERUPTION: ICD-10-CM

## 2019-01-08 DIAGNOSIS — L98.8 OTHER SPECIFIED DISORDERS OF THE SKIN AND SUBCUTANEOUS TISSUE: ICD-10-CM

## 2019-01-10 ENCOUNTER — APPOINTMENT (OUTPATIENT)
Dept: VASCULAR SURGERY | Facility: CLINIC | Age: 69
End: 2019-01-10
Payer: MEDICARE

## 2019-01-10 VITALS
SYSTOLIC BLOOD PRESSURE: 110 MMHG | HEIGHT: 71 IN | DIASTOLIC BLOOD PRESSURE: 70 MMHG | WEIGHT: 208 LBS | BODY MASS INDEX: 29.12 KG/M2

## 2019-01-10 DIAGNOSIS — Z78.9 OTHER SPECIFIED HEALTH STATUS: ICD-10-CM

## 2019-01-10 PROCEDURE — 99214 OFFICE O/P EST MOD 30 MIN: CPT

## 2019-01-10 NOTE — HISTORY OF PRESENT ILLNESS
[FreeTextEntry1] : 67 y/o gentleman with h/o DM, presents for nonhealing wounds to left foot for the past 3 weeks, underwent removal of foreign body by podiatry few weeks ago. He denies any pain. Has h/o right 1st toe amputation.

## 2019-01-10 NOTE — ASSESSMENT
[FreeTextEntry1] : 67 y/o gentleman with h/o DM, presents for nonhealing wounds to left foot for the past 3 weeks, underwent removal of foreign body by podiatry few weeks ago. He denies any pain. Has h/o right 1st toe amputation. I reviewed the arterial duplex done at [facility] that showed left tibial vessel disease. He has gangrene to left foot with absent pulses on exam. I have offered him a left lower extremity angiogram and he wants to proceed. He is scheduled for the procedure on 1/18/19, and will require medical clearance prior to the procedure. He should continue with local wound care and Podiatry followup.

## 2019-01-10 NOTE — CONSULT LETTER
[Dear  ___] : Dear  [unfilled], [Consult Letter:] : I had the pleasure of evaluating your patient, [unfilled]. [Please see my note below.] : Please see my note below. [FreeTextEntry2] : Dear Dr. Tariq Cody,

## 2019-01-10 NOTE — DATA REVIEWED
[FreeTextEntry1] : I reviewed the arterial duplex done at [facility] that showed left tibial vessel disease.

## 2019-01-14 ENCOUNTER — APPOINTMENT (OUTPATIENT)
Dept: VASCULAR SURGERY | Facility: CLINIC | Age: 69
End: 2019-01-14

## 2019-01-15 ENCOUNTER — OUTPATIENT (OUTPATIENT)
Dept: OUTPATIENT SERVICES | Facility: HOSPITAL | Age: 69
LOS: 1 days | Discharge: HOME | End: 2019-01-15

## 2019-01-15 VITALS
HEIGHT: 72 IN | SYSTOLIC BLOOD PRESSURE: 172 MMHG | OXYGEN SATURATION: 97 % | RESPIRATION RATE: 16 BRPM | DIASTOLIC BLOOD PRESSURE: 84 MMHG | HEART RATE: 68 BPM | WEIGHT: 209.44 LBS | TEMPERATURE: 97 F

## 2019-01-15 DIAGNOSIS — I73.9 PERIPHERAL VASCULAR DISEASE, UNSPECIFIED: ICD-10-CM

## 2019-01-15 DIAGNOSIS — Z01.818 ENCOUNTER FOR OTHER PREPROCEDURAL EXAMINATION: ICD-10-CM

## 2019-01-15 DIAGNOSIS — Z89.421 ACQUIRED ABSENCE OF OTHER RIGHT TOE(S): Chronic | ICD-10-CM

## 2019-01-15 NOTE — H&P PST ADULT - PMH
Depression    Diabetic foot ulcer    DM (diabetes mellitus)  12/27/18 hgb aic  11.2  Dyslipidemia    GERD (gastroesophageal reflux disease)    HTN (hypertension)

## 2019-01-15 NOTE — H&P PST ADULT - ADDITIONAL PE
no jad no loose teeth tmf=d > 3 fbd neck from diabetic foot ulcer left toe under tx dsg d and I sx shoe in place s/p rt great toe amputation 2010 for ulcer contact iso vre bone

## 2019-01-15 NOTE — H&P PST ADULT - PRESSURE ULCER
lt toe ulcer under tx pod clinic [facility] s/p amputation rt great toe ulcer 2010 contact iso for vre bone

## 2019-01-15 NOTE — H&P PST ADULT - REASON FOR ADMISSION
68 yr old man to past for left lower extremity angiogram possible endovascular revascularization h/o pvd and diabetic foot ulcers currently under tx rt foot ulcer  at podiatry cliinc no antibiotics at this time and s/p  2010 rt great toe amputation for ulcer  contact iso vre bone no fever no uti uri cp palp sob pain at this time exercise tolerance is 1-2 flights slow no changes no jad screen revd

## 2019-01-16 DIAGNOSIS — I10 ESSENTIAL (PRIMARY) HYPERTENSION: ICD-10-CM

## 2019-01-16 DIAGNOSIS — E11.9 TYPE 2 DIABETES MELLITUS WITHOUT COMPLICATIONS: ICD-10-CM

## 2019-01-16 DIAGNOSIS — I25.10 ATHEROSCLEROTIC HEART DISEASE OF NATIVE CORONARY ARTERY WITHOUT ANGINA PECTORIS: ICD-10-CM

## 2019-01-17 ENCOUNTER — OUTPATIENT (OUTPATIENT)
Dept: OUTPATIENT SERVICES | Facility: HOSPITAL | Age: 69
LOS: 1 days | Discharge: HOME | End: 2019-01-17

## 2019-01-17 ENCOUNTER — APPOINTMENT (OUTPATIENT)
Dept: ENDOCRINOLOGY | Facility: CLINIC | Age: 69
End: 2019-01-17

## 2019-01-17 VITALS
SYSTOLIC BLOOD PRESSURE: 154 MMHG | WEIGHT: 208 LBS | HEIGHT: 71 IN | BODY MASS INDEX: 29.12 KG/M2 | HEART RATE: 88 BPM | DIASTOLIC BLOOD PRESSURE: 86 MMHG

## 2019-01-17 DIAGNOSIS — Z89.421 ACQUIRED ABSENCE OF OTHER RIGHT TOE(S): Chronic | ICD-10-CM

## 2019-01-17 PROBLEM — E11.621 TYPE 2 DIABETES MELLITUS WITH FOOT ULCER: Chronic | Status: ACTIVE | Noted: 2019-01-15

## 2019-01-17 PROBLEM — K21.9 GASTRO-ESOPHAGEAL REFLUX DISEASE WITHOUT ESOPHAGITIS: Chronic | Status: ACTIVE | Noted: 2019-01-15

## 2019-01-17 PROBLEM — F32.9 MAJOR DEPRESSIVE DISORDER, SINGLE EPISODE, UNSPECIFIED: Chronic | Status: ACTIVE | Noted: 2019-01-15

## 2019-01-17 LAB — GLUCOSE BLDC GLUCOMTR-MCNC: 176 MG/DL — HIGH (ref 70–99)

## 2019-01-17 NOTE — PHYSICAL EXAM
[No Acute Distress] : no acute distress [Normal Sclera/Conjunctiva] : normal sclera/conjunctiva [Normal Outer Ear/Nose] : the outer ears and nose were normal in appearance [No JVD] : no jugular venous distention [Normal Rate] : normal rate  [Soft] : abdomen soft [Non Tender] : non-tender [Normal Bowel Sounds] : normal bowel sounds

## 2019-01-17 NOTE — ASSESSMENT
[FreeTextEntry1] : DM: trulicity 0.75 once a week, pioglitazone 30 once a day.\par       f/u in 3 months

## 2019-01-17 NOTE — PLAN
[FreeTextEntry1] : patient is somewhat depressed, and has not been doing blood glucose monitoring, but he should continue with planned vascilar procedures.no lipd profile, or urine microalbumin cheked

## 2019-01-17 NOTE — HISTORY OF PRESENT ILLNESS
[FreeTextEntry1] : uncontrolled type 2 diabetes. [de-identified] : 69 yo male with PMH of DM type 2, comes for follow up visit.\par s/p rt great toe amputation.\par FB in left foot removed by podiatry, gangrene in left toes, followed by vascular, planned for intervention in left tibial artery, needs medical clearance and optimum glucose control.\par no new complaints.

## 2019-01-18 ENCOUNTER — OUTPATIENT (OUTPATIENT)
Dept: OUTPATIENT SERVICES | Facility: HOSPITAL | Age: 69
LOS: 1 days | Discharge: HOME | End: 2019-01-18

## 2019-01-18 ENCOUNTER — APPOINTMENT (OUTPATIENT)
Dept: VASCULAR SURGERY | Facility: HOSPITAL | Age: 69
End: 2019-01-18
Payer: MEDICARE

## 2019-01-18 VITALS
HEART RATE: 93 BPM | WEIGHT: 207.9 LBS | TEMPERATURE: 98 F | RESPIRATION RATE: 20 BRPM | DIASTOLIC BLOOD PRESSURE: 90 MMHG | HEIGHT: 71 IN | SYSTOLIC BLOOD PRESSURE: 158 MMHG

## 2019-01-18 VITALS
SYSTOLIC BLOOD PRESSURE: 147 MMHG | DIASTOLIC BLOOD PRESSURE: 93 MMHG | HEART RATE: 87 BPM | RESPIRATION RATE: 16 BRPM | OXYGEN SATURATION: 97 %

## 2019-01-18 DIAGNOSIS — Z89.421 ACQUIRED ABSENCE OF OTHER RIGHT TOE(S): Chronic | ICD-10-CM

## 2019-01-18 LAB — GLUCOSE BLDC GLUCOMTR-MCNC: 235 MG/DL — HIGH (ref 70–99)

## 2019-01-18 PROCEDURE — 37228: CPT | Mod: LT

## 2019-01-18 PROCEDURE — 36247 INS CATH ABD/L-EXT ART 3RD: CPT | Mod: 59,LT

## 2019-01-18 PROCEDURE — 76937 US GUIDE VASCULAR ACCESS: CPT | Mod: 26

## 2019-01-18 PROCEDURE — 75710 ARTERY X-RAYS ARM/LEG: CPT | Mod: 26,59

## 2019-01-18 RX ORDER — ONDANSETRON 8 MG/1
4 TABLET, FILM COATED ORAL ONCE
Qty: 0 | Refills: 0 | Status: DISCONTINUED | OUTPATIENT
Start: 2019-01-18 | End: 2019-02-02

## 2019-01-18 RX ORDER — SODIUM CHLORIDE 9 MG/ML
1000 INJECTION, SOLUTION INTRAVENOUS
Qty: 0 | Refills: 0 | Status: DISCONTINUED | OUTPATIENT
Start: 2019-01-18 | End: 2019-02-02

## 2019-01-18 RX ORDER — HYDROMORPHONE HYDROCHLORIDE 2 MG/ML
0.25 INJECTION INTRAMUSCULAR; INTRAVENOUS; SUBCUTANEOUS
Qty: 0 | Refills: 0 | Status: DISCONTINUED | OUTPATIENT
Start: 2019-01-18 | End: 2019-01-18

## 2019-01-18 RX ORDER — OXYCODONE AND ACETAMINOPHEN 5; 325 MG/1; MG/1
1 TABLET ORAL ONCE
Qty: 0 | Refills: 0 | Status: DISCONTINUED | OUTPATIENT
Start: 2019-01-18 | End: 2019-01-18

## 2019-01-18 RX ORDER — HYDROMORPHONE HYDROCHLORIDE 2 MG/ML
0.5 INJECTION INTRAMUSCULAR; INTRAVENOUS; SUBCUTANEOUS
Qty: 0 | Refills: 0 | Status: DISCONTINUED | OUTPATIENT
Start: 2019-01-18 | End: 2019-01-18

## 2019-01-18 RX ORDER — ASPIRIN/CALCIUM CARB/MAGNESIUM 324 MG
81 TABLET ORAL ONCE
Qty: 0 | Refills: 0 | Status: COMPLETED | OUTPATIENT
Start: 2019-01-18 | End: 2019-01-18

## 2019-01-18 RX ORDER — ASPIRIN/CALCIUM CARB/MAGNESIUM 324 MG
1 TABLET ORAL
Qty: 90 | Refills: 3
Start: 2019-01-18 | End: 2020-01-12

## 2019-01-18 RX ADMIN — SODIUM CHLORIDE 100 MILLILITER(S): 9 INJECTION, SOLUTION INTRAVENOUS at 15:09

## 2019-01-18 RX ADMIN — Medication 81 MILLIGRAM(S): at 15:38

## 2019-01-18 NOTE — BRIEF OPERATIVE NOTE - PROCEDURE
<<-----Click on this checkbox to enter Procedure Angiogram, lower extremity, with angioplasty  01/18/2019  Left lower limb angiogram, PTA balloon angioplasty  Active  CHRISTIANO

## 2019-01-18 NOTE — CHART NOTE - NSCHARTNOTEFT_GEN_A_CORE
s/p Angio LLE 1/18/19 by Dr. Fletcher    pt was concerned about his left foot wounds and dr. fletcher contacted Dr. aguilar for evaluation of his left foot wounds.    macerated periwound left 1st hallux medially and left 2nd digit laterally which rubbing against each other.  dry gangrene left plantar hallux, 3/4/5 digit stable. suture from the left submet 2 removed, s/p foreign body left foot.  local wound care: betadine soaked 4x4 guaze in between each interspace with kerlix daily  instruction given to wife how to change the dressing.  pt states that he has an appt with Dr. Wolf next friday.  if any other issues and concerns pt to  come to the ED.

## 2019-01-18 NOTE — CHART NOTE - NSCHARTNOTEFT_GEN_A_CORE
PACU ANESTHESIA ADMISSION NOTE      Procedure:   Post op diagnosis:  Arterial insufficiency with ischemic ulcer      ____  Intubated  TV:______       Rate: ______      FiO2: ______    _x___  Patent Airway    _x___  Full return of protective reflexes    _x___  Full recovery from anesthesia / back to baseline status    Vitals:    See anesthesia record      Mental Status:  _x___ Awake   _____ Alert   _____ Drowsy   _____ Sedated    Nausea/Vomiting:  _x___  NO       ______Yes,   See Post - Op Orders         Pain Scale (0-10):  __0___    Treatment: _x___ None    ____ See Post - Op/PCA Orders    Post - Operative Fluids:   __x__ Oral   ____ See Post - Op Orders    Plan: Discharge:   _x___Home       _____Floor     _____Critical Care    _____  Other:_________________    Comments:  No anesthesia issues or complications noted.  Discharge when criteria met.

## 2019-01-18 NOTE — BRIEF OPERATIVE NOTE - OPERATION/FINDINGS
No significant disease in the SFA and popliteal artereis.  Multilevel disease note in the posterior tibial and anterior tibial arteries.  Balloon angioplasty of the PTA done.  not able to cross STAR.

## 2019-01-25 ENCOUNTER — OUTPATIENT (OUTPATIENT)
Dept: OUTPATIENT SERVICES | Facility: HOSPITAL | Age: 69
LOS: 1 days | Discharge: HOME | End: 2019-01-25

## 2019-01-25 ENCOUNTER — APPOINTMENT (OUTPATIENT)
Dept: PODIATRY | Facility: CLINIC | Age: 69
End: 2019-01-25
Payer: MEDICARE

## 2019-01-25 VITALS
HEIGHT: 71 IN | BODY MASS INDEX: 29.12 KG/M2 | HEART RATE: 80 BPM | RESPIRATION RATE: 18 BRPM | WEIGHT: 208 LBS | SYSTOLIC BLOOD PRESSURE: 145 MMHG | DIASTOLIC BLOOD PRESSURE: 78 MMHG

## 2019-01-25 DIAGNOSIS — Z89.421 ACQUIRED ABSENCE OF OTHER RIGHT TOE(S): Chronic | ICD-10-CM

## 2019-01-25 DIAGNOSIS — S90.852D SUPERFICIAL FOREIGN BODY, LEFT FOOT, SUBSEQUENT ENCOUNTER: ICD-10-CM

## 2019-01-25 PROCEDURE — 99213 OFFICE O/P EST LOW 20 MIN: CPT | Mod: 24

## 2019-01-25 PROCEDURE — 99024 POSTOP FOLLOW-UP VISIT: CPT

## 2019-01-26 PROBLEM — S90.852D FOREIGN BODY IN LEFT FOOT, SUBSEQUENT ENCOUNTER: Status: ACTIVE | Noted: 2019-01-26

## 2019-01-28 DIAGNOSIS — E78.5 HYPERLIPIDEMIA, UNSPECIFIED: ICD-10-CM

## 2019-01-28 DIAGNOSIS — I70.245 ATHEROSCLEROSIS OF NATIVE ARTERIES OF LEFT LEG WITH ULCERATION OF OTHER PART OF FOOT: ICD-10-CM

## 2019-01-28 DIAGNOSIS — L97.529 NON-PRESSURE CHRONIC ULCER OF OTHER PART OF LEFT FOOT WITH UNSPECIFIED SEVERITY: ICD-10-CM

## 2019-01-28 DIAGNOSIS — F32.9 MAJOR DEPRESSIVE DISORDER, SINGLE EPISODE, UNSPECIFIED: ICD-10-CM

## 2019-01-28 DIAGNOSIS — I10 ESSENTIAL (PRIMARY) HYPERTENSION: ICD-10-CM

## 2019-01-28 DIAGNOSIS — E11.9 TYPE 2 DIABETES MELLITUS WITHOUT COMPLICATIONS: ICD-10-CM

## 2019-01-28 DIAGNOSIS — E66.9 OBESITY, UNSPECIFIED: ICD-10-CM

## 2019-02-08 DIAGNOSIS — E11.42 TYPE 2 DIABETES MELLITUS WITH DIABETIC POLYNEUROPATHY: ICD-10-CM

## 2019-02-08 DIAGNOSIS — Z98.890 OTHER SPECIFIED POSTPROCEDURAL STATES: ICD-10-CM

## 2019-02-08 DIAGNOSIS — I96 GANGRENE, NOT ELSEWHERE CLASSIFIED: ICD-10-CM

## 2019-02-08 DIAGNOSIS — S90.852D SUPERFICIAL FOREIGN BODY, LEFT FOOT, SUBSEQUENT ENCOUNTER: ICD-10-CM

## 2019-02-12 ENCOUNTER — OUTPATIENT (OUTPATIENT)
Dept: OUTPATIENT SERVICES | Facility: HOSPITAL | Age: 69
LOS: 1 days | Discharge: HOME | End: 2019-02-12

## 2019-02-12 ENCOUNTER — APPOINTMENT (OUTPATIENT)
Dept: PODIATRY | Facility: CLINIC | Age: 69
End: 2019-02-12
Payer: MEDICARE

## 2019-02-12 VITALS
BODY MASS INDEX: 29.12 KG/M2 | DIASTOLIC BLOOD PRESSURE: 74 MMHG | SYSTOLIC BLOOD PRESSURE: 164 MMHG | HEART RATE: 79 BPM | HEIGHT: 71 IN | WEIGHT: 208 LBS

## 2019-02-12 DIAGNOSIS — Z89.421 ACQUIRED ABSENCE OF OTHER RIGHT TOE(S): Chronic | ICD-10-CM

## 2019-02-12 PROCEDURE — 99024 POSTOP FOLLOW-UP VISIT: CPT

## 2019-02-12 PROCEDURE — 11042 DBRDMT SUBQ TIS 1ST 20SQCM/<: CPT | Mod: 79

## 2019-02-15 ENCOUNTER — APPOINTMENT (OUTPATIENT)
Dept: PODIATRY | Facility: CLINIC | Age: 69
End: 2019-02-15

## 2019-02-20 ENCOUNTER — OUTPATIENT (OUTPATIENT)
Dept: OUTPATIENT SERVICES | Facility: HOSPITAL | Age: 69
LOS: 1 days | Discharge: HOME | End: 2019-02-20

## 2019-02-20 ENCOUNTER — APPOINTMENT (OUTPATIENT)
Dept: PODIATRY | Facility: CLINIC | Age: 69
End: 2019-02-20
Payer: MEDICARE

## 2019-02-20 VITALS — HEART RATE: 89 BPM | DIASTOLIC BLOOD PRESSURE: 83 MMHG | SYSTOLIC BLOOD PRESSURE: 156 MMHG

## 2019-02-20 DIAGNOSIS — Z89.421 ACQUIRED ABSENCE OF OTHER RIGHT TOE(S): Chronic | ICD-10-CM

## 2019-02-20 PROCEDURE — 11042 DBRDMT SUBQ TIS 1ST 20SQCM/<: CPT | Mod: 78

## 2019-02-26 DIAGNOSIS — I96 GANGRENE, NOT ELSEWHERE CLASSIFIED: ICD-10-CM

## 2019-02-26 DIAGNOSIS — E11.40 TYPE 2 DIABETES MELLITUS WITH DIABETIC NEUROPATHY, UNSPECIFIED: ICD-10-CM

## 2019-02-26 DIAGNOSIS — Z98.890 OTHER SPECIFIED POSTPROCEDURAL STATES: ICD-10-CM

## 2019-02-27 ENCOUNTER — OUTPATIENT (OUTPATIENT)
Dept: OUTPATIENT SERVICES | Facility: HOSPITAL | Age: 69
LOS: 1 days | Discharge: HOME | End: 2019-02-27

## 2019-02-27 ENCOUNTER — APPOINTMENT (OUTPATIENT)
Dept: PODIATRY | Facility: CLINIC | Age: 69
End: 2019-02-27
Payer: MEDICARE

## 2019-02-27 VITALS
HEIGHT: 71 IN | BODY MASS INDEX: 29.12 KG/M2 | SYSTOLIC BLOOD PRESSURE: 154 MMHG | DIASTOLIC BLOOD PRESSURE: 88 MMHG | WEIGHT: 208 LBS | HEART RATE: 90 BPM

## 2019-02-27 DIAGNOSIS — Z89.421 ACQUIRED ABSENCE OF OTHER RIGHT TOE(S): Chronic | ICD-10-CM

## 2019-02-27 PROCEDURE — 11042 DBRDMT SUBQ TIS 1ST 20SQCM/<: CPT | Mod: 79

## 2019-02-28 ENCOUNTER — APPOINTMENT (OUTPATIENT)
Dept: VASCULAR SURGERY | Facility: CLINIC | Age: 69
End: 2019-02-28
Payer: MEDICARE

## 2019-02-28 PROCEDURE — 99213 OFFICE O/P EST LOW 20 MIN: CPT

## 2019-02-28 PROCEDURE — 93926 LOWER EXTREMITY STUDY: CPT

## 2019-02-28 NOTE — HISTORY OF PRESENT ILLNESS
[FreeTextEntry1] : 70 y/o gentleman with h/o DM, with PVD, nonhealing wounds to left foot underwent left PTA angioplasty on 1/18/19. He states that the wounds are improving.

## 2019-02-28 NOTE — DATA REVIEWED
[FreeTextEntry1] : I performed an arterial duplex which was medically necessary to evaluate for patency of the angioplasty site. It showed patent PT artery without any restenosis.\par

## 2019-02-28 NOTE — ASSESSMENT
[FreeTextEntry1] : 70 y/o gentleman with h/o DM, with PVD, nonhealing wounds to left foot underwent left PTA angioplasty on 1/18/19. He states that the wounds are improving. I performed an arterial duplex which was medically necessary to evaluate for patency of the angioplasty site. It showed patent PT artery without any restenosis. I recommend that he continue with local wound care (with Santyl) and follow up with Podiatry. I will see him back in three months time for follow up.

## 2019-03-07 ENCOUNTER — OUTPATIENT (OUTPATIENT)
Dept: OUTPATIENT SERVICES | Facility: HOSPITAL | Age: 69
LOS: 1 days | Discharge: HOME | End: 2019-03-07

## 2019-03-07 ENCOUNTER — APPOINTMENT (OUTPATIENT)
Dept: PODIATRY | Facility: CLINIC | Age: 69
End: 2019-03-07
Payer: MEDICARE

## 2019-03-07 VITALS
SYSTOLIC BLOOD PRESSURE: 159 MMHG | BODY MASS INDEX: 29.12 KG/M2 | HEART RATE: 85 BPM | HEIGHT: 71 IN | WEIGHT: 208 LBS | DIASTOLIC BLOOD PRESSURE: 80 MMHG

## 2019-03-07 DIAGNOSIS — Z89.421 ACQUIRED ABSENCE OF OTHER RIGHT TOE(S): Chronic | ICD-10-CM

## 2019-03-07 PROCEDURE — 11042 DBRDMT SUBQ TIS 1ST 20SQCM/<: CPT | Mod: 78

## 2019-03-14 ENCOUNTER — OUTPATIENT (OUTPATIENT)
Dept: OUTPATIENT SERVICES | Facility: HOSPITAL | Age: 69
LOS: 1 days | Discharge: HOME | End: 2019-03-14

## 2019-03-14 ENCOUNTER — APPOINTMENT (OUTPATIENT)
Dept: PODIATRY | Facility: CLINIC | Age: 69
End: 2019-03-14
Payer: MEDICARE

## 2019-03-14 VITALS
HEART RATE: 80 BPM | DIASTOLIC BLOOD PRESSURE: 72 MMHG | HEIGHT: 71 IN | BODY MASS INDEX: 29.12 KG/M2 | SYSTOLIC BLOOD PRESSURE: 137 MMHG | WEIGHT: 208 LBS

## 2019-03-14 DIAGNOSIS — Z89.421 ACQUIRED ABSENCE OF OTHER RIGHT TOE(S): Chronic | ICD-10-CM

## 2019-03-14 PROCEDURE — 11042 DBRDMT SUBQ TIS 1ST 20SQCM/<: CPT | Mod: 78

## 2019-03-28 ENCOUNTER — OUTPATIENT (OUTPATIENT)
Dept: OUTPATIENT SERVICES | Facility: HOSPITAL | Age: 69
LOS: 1 days | Discharge: HOME | End: 2019-03-28

## 2019-03-28 ENCOUNTER — APPOINTMENT (OUTPATIENT)
Dept: PODIATRY | Facility: CLINIC | Age: 69
End: 2019-03-28
Payer: MEDICARE

## 2019-03-28 DIAGNOSIS — Z89.421 ACQUIRED ABSENCE OF OTHER RIGHT TOE(S): Chronic | ICD-10-CM

## 2019-03-28 PROCEDURE — 11042 DBRDMT SUBQ TIS 1ST 20SQCM/<: CPT | Mod: 78

## 2019-03-29 DIAGNOSIS — I96 GANGRENE, NOT ELSEWHERE CLASSIFIED: ICD-10-CM

## 2019-04-04 ENCOUNTER — OUTPATIENT (OUTPATIENT)
Dept: OUTPATIENT SERVICES | Facility: HOSPITAL | Age: 69
LOS: 1 days | Discharge: HOME | End: 2019-04-04

## 2019-04-04 ENCOUNTER — APPOINTMENT (OUTPATIENT)
Dept: ENDOCRINOLOGY | Facility: CLINIC | Age: 69
End: 2019-04-04

## 2019-04-04 VITALS
SYSTOLIC BLOOD PRESSURE: 132 MMHG | HEIGHT: 71 IN | DIASTOLIC BLOOD PRESSURE: 76 MMHG | BODY MASS INDEX: 29.12 KG/M2 | HEART RATE: 75 BPM | WEIGHT: 208 LBS

## 2019-04-04 DIAGNOSIS — Z89.421 ACQUIRED ABSENCE OF OTHER RIGHT TOE(S): Chronic | ICD-10-CM

## 2019-04-04 NOTE — ASSESSMENT
[Carbohydrate Consistent Diet] : carbohydrate consistent diet [Diabetes Foot Care] : diabetes foot care [Long Term Vascular Complications] : long term vascular complications of diabetes [Importance of Diet and Exercise] : importance of diet and exercise to improve glycemic control, achieve weight loss and improve cardiovascular health [FreeTextEntry1] : try 1.5 mg. weekly of trulicity.

## 2019-04-11 ENCOUNTER — OUTPATIENT (OUTPATIENT)
Dept: OUTPATIENT SERVICES | Facility: HOSPITAL | Age: 69
LOS: 1 days | Discharge: HOME | End: 2019-04-11

## 2019-04-11 ENCOUNTER — APPOINTMENT (OUTPATIENT)
Dept: PODIATRY | Facility: CLINIC | Age: 69
End: 2019-04-11
Payer: MEDICARE

## 2019-04-11 VITALS — WEIGHT: 208 LBS | HEIGHT: 71 IN | BODY MASS INDEX: 29.12 KG/M2

## 2019-04-11 DIAGNOSIS — Z89.421 ACQUIRED ABSENCE OF OTHER RIGHT TOE(S): Chronic | ICD-10-CM

## 2019-04-11 PROCEDURE — 11042 DBRDMT SUBQ TIS 1ST 20SQCM/<: CPT

## 2019-04-25 ENCOUNTER — APPOINTMENT (OUTPATIENT)
Dept: PODIATRY | Facility: CLINIC | Age: 69
End: 2019-04-25
Payer: MEDICARE

## 2019-04-25 ENCOUNTER — OUTPATIENT (OUTPATIENT)
Dept: OUTPATIENT SERVICES | Facility: HOSPITAL | Age: 69
LOS: 1 days | Discharge: HOME | End: 2019-04-25

## 2019-04-25 DIAGNOSIS — Z89.421 ACQUIRED ABSENCE OF OTHER RIGHT TOE(S): Chronic | ICD-10-CM

## 2019-04-25 PROCEDURE — 11042 DBRDMT SUBQ TIS 1ST 20SQCM/<: CPT

## 2019-05-09 ENCOUNTER — APPOINTMENT (OUTPATIENT)
Dept: PODIATRY | Facility: CLINIC | Age: 69
End: 2019-05-09
Payer: MEDICARE

## 2019-05-09 ENCOUNTER — OUTPATIENT (OUTPATIENT)
Dept: OUTPATIENT SERVICES | Facility: HOSPITAL | Age: 69
LOS: 1 days | Discharge: HOME | End: 2019-05-09

## 2019-05-09 VITALS
DIASTOLIC BLOOD PRESSURE: 69 MMHG | SYSTOLIC BLOOD PRESSURE: 162 MMHG | WEIGHT: 210 LBS | BODY MASS INDEX: 29.4 KG/M2 | HEIGHT: 71 IN

## 2019-05-09 DIAGNOSIS — Z89.421 ACQUIRED ABSENCE OF OTHER RIGHT TOE(S): Chronic | ICD-10-CM

## 2019-05-09 PROCEDURE — 11042 DBRDMT SUBQ TIS 1ST 20SQCM/<: CPT

## 2019-05-23 ENCOUNTER — APPOINTMENT (OUTPATIENT)
Dept: PODIATRY | Facility: CLINIC | Age: 69
End: 2019-05-23
Payer: MEDICARE

## 2019-05-23 ENCOUNTER — OUTPATIENT (OUTPATIENT)
Dept: OUTPATIENT SERVICES | Facility: HOSPITAL | Age: 69
LOS: 1 days | Discharge: HOME | End: 2019-05-23

## 2019-05-23 VITALS
SYSTOLIC BLOOD PRESSURE: 135 MMHG | HEIGHT: 71 IN | HEART RATE: 79 BPM | WEIGHT: 210 LBS | BODY MASS INDEX: 29.4 KG/M2 | DIASTOLIC BLOOD PRESSURE: 84 MMHG

## 2019-05-23 DIAGNOSIS — Z89.421 ACQUIRED ABSENCE OF OTHER RIGHT TOE(S): Chronic | ICD-10-CM

## 2019-05-23 PROCEDURE — 11042 DBRDMT SUBQ TIS 1ST 20SQCM/<: CPT

## 2019-05-30 ENCOUNTER — APPOINTMENT (OUTPATIENT)
Dept: VASCULAR SURGERY | Facility: CLINIC | Age: 69
End: 2019-05-30
Payer: MEDICARE

## 2019-05-30 VITALS
DIASTOLIC BLOOD PRESSURE: 86 MMHG | SYSTOLIC BLOOD PRESSURE: 140 MMHG | BODY MASS INDEX: 29.4 KG/M2 | HEIGHT: 71 IN | WEIGHT: 210 LBS

## 2019-05-30 PROCEDURE — 99213 OFFICE O/P EST LOW 20 MIN: CPT

## 2019-05-30 NOTE — HISTORY OF PRESENT ILLNESS
[FreeTextEntry1] : 68 y/o gentleman with h/o DM, with PVD, nonhealing wounds to left foot underwent left PTA angioplasty on 1/18/19. He states that the wounds are improving.

## 2019-05-30 NOTE — CONSULT LETTER
[Dear  ___] : Dear  [unfilled], [Courtesy Letter:] : I had the pleasure of seeing your patient, [unfilled], in my office today. [Please see my note below.] : Please see my note below. [FreeTextEntry2] : Dear Dr. Tariq Cody,

## 2019-05-30 NOTE — ASSESSMENT
[FreeTextEntry1] : 68 y/o gentleman with h/o DM, with PVD, nonhealing wounds to left foot underwent left PTA angioplasty on 1/18/19. He states that the wounds are improving. I recommend that he continue with local wound care (with Santyl) and follow up with Podiatry. I will see him back in six months time for follow up and repeat the arterial duplex at the time.

## 2019-06-06 ENCOUNTER — MOBILE ON CALL (OUTPATIENT)
Age: 69
End: 2019-06-06

## 2019-06-06 ENCOUNTER — APPOINTMENT (OUTPATIENT)
Dept: PODIATRY | Facility: CLINIC | Age: 69
End: 2019-06-06
Payer: MEDICARE

## 2019-06-06 ENCOUNTER — OUTPATIENT (OUTPATIENT)
Dept: OUTPATIENT SERVICES | Facility: HOSPITAL | Age: 69
LOS: 1 days | Discharge: HOME | End: 2019-06-06

## 2019-06-06 VITALS
BODY MASS INDEX: 29.12 KG/M2 | HEIGHT: 71 IN | DIASTOLIC BLOOD PRESSURE: 83 MMHG | SYSTOLIC BLOOD PRESSURE: 141 MMHG | WEIGHT: 208 LBS

## 2019-06-06 DIAGNOSIS — Z89.421 ACQUIRED ABSENCE OF OTHER RIGHT TOE(S): Chronic | ICD-10-CM

## 2019-06-06 PROCEDURE — 11042 DBRDMT SUBQ TIS 1ST 20SQCM/<: CPT

## 2019-06-07 NOTE — HISTORY OF PRESENT ILLNESS
[FreeTextEntry1] : A 68 y/o male with PMH DM2 returns for management of dry gangrene left foot. Digits 3-5 have healed well. Gangrenous eschar has since healed with santyl application and debridement. Pt is doing well. Pt has been applying medihoney. no new complaints

## 2019-06-07 NOTE — PROCEDURE
[FreeTextEntry1] : \par  #15 blade and curette used to debrided hallux and down level of subcutaneous tissue w/o incidentt\par -continue  applying hydrogel \par -photo documentation taken \par - RTC 2 weeks\par

## 2019-06-07 NOTE — PHYSICAL EXAM
[General Appearance - Alert] : alert [General Appearance - In No Acute Distress] : in no acute distress [Left Foot Was Examined] : The left foot was examined [Normal in Appearance] : normal in appearance [Normal] : normal [1+] : 1+ in the dorsalis pedis [Oriented To Time, Place, And Person] : oriented to person, place, and time [Swelling] : not swollen [Erythema] : not erythematous [Delayed in the Left Toes] : normal in the toes [FreeTextEntry1] : plantar incision site has healed well, no signs of infection. necrotic patch left 1st and 2nd plantar digit. IImproved in macerated tissue. Remaining digits are healing well

## 2019-06-07 NOTE — PHYSICAL EXAM
[General Appearance - Alert] : alert [General Appearance - In No Acute Distress] : in no acute distress [Left Foot Was Examined] : The left foot was examined [Normal] : normal [1+] : 1+ in the dorsalis pedis [Oriented To Time, Place, And Person] : oriented to person, place, and time [Swelling] : not swollen [Erythema] : not erythematous [Delayed in the Left Toes] : normal in the toes [FreeTextEntry1] : left hallux and 2nd digit fibrotic tissue changes (improved form last visit)

## 2019-06-07 NOTE — PROCEDURE
[FreeTextEntry1] : - pt seen/evaluated w/ attending \par - #15 blade and curette used to bedrid hallux and 2nd digit ulceration down to level of subcutaneous tissue w/o incident\par -Photo documentation taken\par - dressed w duoderm and medical honey.  \par - pt advised to c/w LWC Q24 (duoderm q48)\par - RTC 2 weeks\par

## 2019-06-07 NOTE — HISTORY OF PRESENT ILLNESS
[FreeTextEntry1] : A 68 y/o male with PMH DM2 returns for management of dry gangrene left  foot. Digits 3-5 have healed well. Gangrenous eschar has since healed with santyl application and debridement. Pt is doing well.

## 2019-06-07 NOTE — PROCEDURE
[FreeTextEntry1] : - pt seen/evaluated w/ attending \par - #15 blade and curette used to debedrid hallux and 2nd digit ulceration down to level of subcutaneous tissue w/o incident\par -Photo documentation taken\par - dressed w silvadene and gauze dressing\par - pt has been applying medihoney. \par - RTC 4 weeks\par

## 2019-06-07 NOTE — HISTORY OF PRESENT ILLNESS
[FreeTextEntry1] : A 68 y/o male with PMH DM2 returns for management of dry gangrene left 1/2/3/4/5 digits and s/p foreign body removal 12/28. Patient has been applying santyl on the necrotic patches q24. Today pt reports no changes to digits and has no other pedal complaints at this time. Denies n/v/f/c/sob

## 2019-06-07 NOTE — PROCEDURE
[FreeTextEntry1] : -s/p foreign body removal left submet 2 12/28, suture intact.\par -Dry gangrene left toes 1-2nd\par -cross hatching of gangrene skin on the 1st and 2nd toes\par -continue betadine soaked dsd, kerlix daily\par -paper work filled out for dressing supplies that will be done by his wife now on.\par -applied soaked betadine guaze dsd kerlix daily\par - Will switch to Santyl/DSD/Kelrix one week before coming to office\par -f/u with vascular for circulation b/l LE\par -control sugar\par -RTC in 2 weeks\par

## 2019-06-07 NOTE — HISTORY OF PRESENT ILLNESS
[FreeTextEntry1] : A 68 y/o male with PMH DM2 returns for management of left hallux ulcer. patient as been applying santyl. no new complaints

## 2019-06-07 NOTE — PROCEDURE
[FreeTextEntry1] : \par -Sharp excisional debridement of fibrotic patches down to subcutaneous region. Pt to continue with santyl application daily\par -continue betadine soaked dsd in left 1st interspace \par -RTC in 1 week\par

## 2019-06-07 NOTE — PROCEDURE
[FreeTextEntry1] : -s/p foreign body removal left submet 2 12/28, suture intact.\par -Dry gangrene left toes 1-2nd\par -Sharp debridement of necrotic patches. there is underlying yellow fibrotic tissue. Pt to continue with santyl application daily\par -continue betadine soaked dsd in left 1st interspace \par -paper work filled out for dressing supplies that will be done by his wife now on.\par -f/u with vascular for circulation b/l LE\par -control sugar\par -RTC in 1 week\par

## 2019-06-07 NOTE — PHYSICAL EXAM
[General Appearance - Alert] : alert [General Appearance - In No Acute Distress] : in no acute distress [Sclera] : the sclera and conjunctiva were normal [PERRL With Normal Accommodation] : pupils were equal in size, round, and reactive to light [Left Foot Was Examined] : The left foot was examined [Normal] : normal [1+] : 1+ in the dorsalis pedis [Oriented To Time, Place, And Person] : oriented to person, place, and time [Swelling] : not swollen [Erythema] : not erythematous [Delayed in the Left Toes] : normal in the toes [FreeTextEntry1] : left hallux and 2nd digit fibrotic tissue changes (improved form last visit)

## 2019-06-07 NOTE — HISTORY OF PRESENT ILLNESS
[FreeTextEntry1] : A 70 y/o male with PMH DM2 returns for management of dry gangrene left  foot. Digits 3-5 have healed well. Gangrenous eschar has since healed with santyl application and debridement. Pt is doing well. Pt has been applying medihoney. no new complaints

## 2019-06-07 NOTE — PROCEDURE
[FreeTextEntry1] : \par - #15 blade and curette used to debrided hallux down to level of subcutaneous tissue w/o incident\par -id/c santyl and apply hydrogel\par -photo documentation taken \par - RTC 2 weeks\par

## 2019-06-07 NOTE — REASON FOR VISIT
[Follow-Up] : a follow-up visit [Spouse] : spouse [FreeTextEntry1] : gangrenous changes to the left  forefoot. s/p foreign body removal left submet 2  12/28, Pt is here today for treatment of unrelated condition to the post op period

## 2019-06-07 NOTE — PHYSICAL EXAM
[General Appearance - Alert] : alert [General Appearance - In No Acute Distress] : in no acute distress [Left Foot Was Examined] : The left foot was examined [Normal] : normal [1+] : 1+ in the dorsalis pedis [Oriented To Time, Place, And Person] : oriented to person, place, and time [Impaired Insight] : insight and judgment were intact [Swelling] : not swollen [Erythema] : not erythematous [Delayed in the Left Toes] : normal in the toes [FreeTextEntry1] : left hallux ulcer is healing well. beefy red granular base. left 2nd toe has healed.

## 2019-06-07 NOTE — PHYSICAL EXAM
[General Appearance - Alert] : alert [General Appearance - In No Acute Distress] : in no acute distress [Sclera] : the sclera and conjunctiva were normal [PERRL With Normal Accommodation] : pupils were equal in size, round, and reactive to light [Left Foot Was Examined] : The left foot was examined [Normal] : normal [1+] : 1+ in the dorsalis pedis [Oriented To Time, Place, And Person] : oriented to person, place, and time [Impaired Insight] : insight and judgment were intact [Swelling] : not swollen [Erythema] : not erythematous [Delayed in the Left Toes] : normal in the toes [FreeTextEntry1] : left hallux and 2nd digit fibrotic tissue changes (improved form last visit)

## 2019-06-07 NOTE — PHYSICAL EXAM
[General Appearance - Alert] : alert [General Appearance - In No Acute Distress] : in no acute distress [Left Foot Was Examined] : The left foot was examined [Normal] : normal [1+] : 1+ in the posterior tibialis [Impaired Insight] : insight and judgment were intact [Oriented To Time, Place, And Person] : oriented to person, place, and time [Swelling] : not swollen [Erythema] : not erythematous [FreeTextEntry1] : left hallux ulcer is healing well. beefy red granular base. left 2nd toe has healed.  [Delayed in the Left Toes] : normal in the toes

## 2019-06-07 NOTE — PROCEDURE
[FreeTextEntry1] : - pt seen/evaluated w/ attending \par - #15 blade and curette used to debrided hallux and down level of subcutaneous tissue w/o incident\par - d/c medihoney and begin applying hydrogel \par - RTC 2 weeks\par

## 2019-06-07 NOTE — HISTORY OF PRESENT ILLNESS
[FreeTextEntry1] : A 68 y/o male with PMH DM2 returns for management of dry gangrene left 1/2/3/4/5 digits and s/p foreign body removal 12/28 with suture removed. Patient has been applying santyl on the necrotic patches

## 2019-06-07 NOTE — REASON FOR VISIT
[Follow-Up] : a follow-up visit [Spouse] : spouse [FreeTextEntry1] : gangrenous changes to the left  forefoot. s/p foreign body removal left submet 2  12/28. Pt is here today for treatment of unrelated condition to the post op period

## 2019-06-07 NOTE — REASON FOR VISIT
[Spouse] : spouse [Follow-Up] : a follow-up visit [FreeTextEntry1] : gangrenous changes to the left forefoot. s/p foreign body removal left  submet 2  12/28

## 2019-06-07 NOTE — HISTORY OF PRESENT ILLNESS
[FreeTextEntry1] : A 68 y/o male with PMH DM2 returns for management of dry gangrene left foot. Digits 3-5 have healed well. Gangrenous eschar has since healed with santyl application and debridement. Pt is doing well. Pt has been applying silvadene. no new complaints

## 2019-06-07 NOTE — HISTORY OF PRESENT ILLNESS
[FreeTextEntry1] : A 68 y/o male with PMH DM2 presents to clinic for dry gangrene left  1/2/3/4/5 digits and s/p foreign body removal 12/28 with suture intact.\par he states that nurse come in every day to change his dressing. he denies any n/v/f/c/sob

## 2019-06-07 NOTE — PROCEDURE
[FreeTextEntry1] : - pt seen/evaluated w/ attending \par - #15 blade and curette used to debride hallux and 2nd digit ulceration down to level of subcutaneous tissue w/o incident\par - hallux wound: 3.5 x 2.5cm and 2nd digit wound 2 x 1.5cm\par - Rx: DM shoes\par - dressed w/ santyl/dsd in office \par - pt advised to c/w LWC Q24 (santyl/DSD)\par - RTC 1 week \par

## 2019-06-07 NOTE — PROCEDURE
[FreeTextEntry1] : - pt seen/evaluated w/ attending \par - #15 blade and curette used to bedrid hallux and 2nd digit ulceration down to level of subcutaneous tissue w/o incident\par - dressed w duoderm and medical honey.  \par - pt advised to c/w LWC Q24 (duoderm q48)\par - RTC 2 weeks\par

## 2019-06-07 NOTE — REASON FOR VISIT
[Follow-Up] : a follow-up visit [Spouse] : spouse [FreeTextEntry1] : gangrenous changes to the left foot

## 2019-06-07 NOTE — REASON FOR VISIT
[Follow-Up] : a follow-up visit [Spouse] : spouse [FreeTextEntry1] : gangrenous changes to the left  forefoot. s/p foreign body removal left submet 2  12/28 , Pt is here today for treatment of unrelated condition to the post op period

## 2019-06-07 NOTE — PROCEDURE
[FreeTextEntry1] : \par -Sharp excisional debridement of fibrotic tissue  down to subcutaneous region.\par - Pt to continue with santyl application daily\par -RTC in 1 week\par

## 2019-06-07 NOTE — END OF VISIT
[] : Resident [Resident] : Resident [FreeTextEntry3] : Ulcer significantly improving. hallux mostly granular tissue, 2nd digit 80% fibrotic tissue. Photo documented in chart. Ulcer debrided with #15 blade. down to subcutaneous layer. continue santyl.

## 2019-06-07 NOTE — REASON FOR VISIT
[Follow-Up] : a follow-up visit [Spouse] : spouse [FreeTextEntry1] : gangrenous changes to the left  forefoot. s/p foreign body removal left submet 2  12/28

## 2019-06-07 NOTE — PHYSICAL EXAM
[General Appearance - Alert] : alert [General Appearance - In No Acute Distress] : in no acute distress [Left Foot Was Examined] : The left foot was examined [Normal] : normal [1+] : 1+ in the dorsalis pedis [Oriented To Time, Place, And Person] : oriented to person, place, and time [Impaired Insight] : insight and judgment were intact [Swelling] : not swollen [Erythema] : not erythematous [FreeTextEntry1] : left hallux ulcer with increased yellow fibrotic tissue.  [Delayed in the Left Toes] : normal in the toes

## 2019-06-07 NOTE — PHYSICAL EXAM
[General Appearance - Alert] : alert [General Appearance - In No Acute Distress] : in no acute distress [Left Foot Was Examined] : The left foot was examined [Normal] : normal [1+] : 1+ in the dorsalis pedis [Oriented To Time, Place, And Person] : oriented to person, place, and time [Impaired Insight] : insight and judgment were intact [Swelling] : not swollen [Delayed in the Left Toes] : normal in the toes [Erythema] : not erythematous [FreeTextEntry1] : left hallux and 2nd digit with minimal fibrotic tissue changes continues to improve since last visit

## 2019-06-07 NOTE — PHYSICAL EXAM
[General Appearance - Alert] : alert [General Appearance - In No Acute Distress] : in no acute distress [Left Foot Was Examined] : The left foot was examined [Normal] : normal [Impaired Insight] : insight and judgment were intact [Oriented To Time, Place, And Person] : oriented to person, place, and time [1+] : 1+ in the dorsalis pedis [Swelling] : not swollen [Erythema] : not erythematous [Delayed in the Left Toes] : normal in the toes [FreeTextEntry1] : left hallux ulcer improved. increase granulation tissue

## 2019-06-07 NOTE — HISTORY OF PRESENT ILLNESS
[FreeTextEntry1] : A 70 y/o male with PMH DM2 returns for management of dry gangrene left t 1/2/3/4/5 digits and s/p foreign body removal 12/28 with suture removed. Patient has been applying santyl on the necrotic patches

## 2019-06-07 NOTE — PHYSICAL EXAM
[General Appearance - Alert] : alert [General Appearance - In No Acute Distress] : in no acute distress [Sclera] : the sclera and conjunctiva were normal [PERRL With Normal Accommodation] : pupils were equal in size, round, and reactive to light [Left Foot Was Examined] : The left foot was examined [Normal] : normal [1+] : 1+ in the dorsalis pedis [Oriented To Time, Place, And Person] : oriented to person, place, and time [Impaired Insight] : insight and judgment were intact [Swelling] : not swollen [Erythema] : not erythematous [Delayed in the Left Toes] : normal in the toes [FreeTextEntry1] : left hallux and 2nd digit fibrotic tissue changes continues to improve

## 2019-06-07 NOTE — PHYSICAL EXAM
[General Appearance - Alert] : alert [General Appearance - In No Acute Distress] : in no acute distress [Left Foot Was Examined] : The left foot was examined [Normal] : normal [1+] : 1+ in the dorsalis pedis [Oriented To Time, Place, And Person] : oriented to person, place, and time [Swelling] : not swollen [Erythema] : not erythematous [Delayed in the Left Toes] : normal in the toes [FreeTextEntry1] : plantar incision site has healed well, no signs of infection. necrotic patches hallux and 2nd digit removed. Maceration in left 1st webspace

## 2019-06-07 NOTE — PROCEDURE
[FreeTextEntry1] : \par - #15 blade and curette used to debrided hallux and down level of subcutaneous tissue w/o incident\par -iRx santyl with instructions on application  \par -photo documentation taken \par -Patient given a prescription for diabetic shoes. Patient would benefit from supportive diabetic shoes that  Molded inserts are indicated to reduce pressure on the plantar foot to prevent ulcerations. Custom molded inserts are indicated, as patient has h/o of right foot amputations and needs a toe-fill insert\par \par - RTC 2 weeks\par

## 2019-06-20 ENCOUNTER — OUTPATIENT (OUTPATIENT)
Dept: OUTPATIENT SERVICES | Facility: HOSPITAL | Age: 69
LOS: 1 days | Discharge: HOME | End: 2019-06-20

## 2019-06-20 ENCOUNTER — APPOINTMENT (OUTPATIENT)
Dept: PODIATRY | Facility: CLINIC | Age: 69
End: 2019-06-20
Payer: MEDICARE

## 2019-06-20 VITALS
HEART RATE: 86 BPM | DIASTOLIC BLOOD PRESSURE: 65 MMHG | HEIGHT: 71 IN | WEIGHT: 208 LBS | SYSTOLIC BLOOD PRESSURE: 170 MMHG | BODY MASS INDEX: 29.12 KG/M2

## 2019-06-20 DIAGNOSIS — Z89.421 ACQUIRED ABSENCE OF OTHER RIGHT TOE(S): Chronic | ICD-10-CM

## 2019-06-20 PROCEDURE — 99212 OFFICE O/P EST SF 10 MIN: CPT

## 2019-06-20 NOTE — HISTORY OF PRESENT ILLNESS
[FreeTextEntry1] : A 68 y/o male with PMH DM2 returns for management of left hallux ulcer. patient as been applying hydrogel. no new complaints

## 2019-06-20 NOTE — PHYSICAL EXAM
[General Appearance - Alert] : alert [General Appearance - In No Acute Distress] : in no acute distress [Left Foot Was Examined] : The left foot was examined [Normal] : normal [1+] : 1+ in the posterior tibialis [Impaired Insight] : insight and judgment were intact [Oriented To Time, Place, And Person] : oriented to person, place, and time [Swelling] : not swollen [Erythema] : not erythematous [Delayed in the Left Toes] : normal in the toes [FreeTextEntry1] : left hallux ulcer improved. increase granulation tissue. 1.8cm x 1.5cm

## 2019-07-11 ENCOUNTER — OUTPATIENT (OUTPATIENT)
Dept: OUTPATIENT SERVICES | Facility: HOSPITAL | Age: 69
LOS: 1 days | Discharge: HOME | End: 2019-07-11

## 2019-07-11 ENCOUNTER — APPOINTMENT (OUTPATIENT)
Dept: PODIATRY | Facility: CLINIC | Age: 69
End: 2019-07-11
Payer: MEDICARE

## 2019-07-11 VITALS
HEART RATE: 76 BPM | DIASTOLIC BLOOD PRESSURE: 83 MMHG | BODY MASS INDEX: 29.12 KG/M2 | SYSTOLIC BLOOD PRESSURE: 134 MMHG | HEIGHT: 71 IN | WEIGHT: 208 LBS

## 2019-07-11 DIAGNOSIS — Z89.421 ACQUIRED ABSENCE OF OTHER RIGHT TOE(S): Chronic | ICD-10-CM

## 2019-07-11 PROCEDURE — 11042 DBRDMT SUBQ TIS 1ST 20SQCM/<: CPT

## 2019-07-15 ENCOUNTER — OUTPATIENT (OUTPATIENT)
Dept: OUTPATIENT SERVICES | Facility: HOSPITAL | Age: 69
LOS: 1 days | Discharge: HOME | End: 2019-07-15

## 2019-07-15 DIAGNOSIS — Z89.421 ACQUIRED ABSENCE OF OTHER RIGHT TOE(S): Chronic | ICD-10-CM

## 2019-07-15 DIAGNOSIS — E11.65 TYPE 2 DIABETES MELLITUS WITH HYPERGLYCEMIA: ICD-10-CM

## 2019-07-15 NOTE — HISTORY OF PRESENT ILLNESS
[FreeTextEntry1] : A 70 y/o male with PMH DM2 returns for management of left hallux ulcer. patient as been applying hydrogel. no new complaints

## 2019-07-15 NOTE — PROCEDURE
[FreeTextEntry1] : -15 blade and curette used to debrided hallux down to level of subcutaneous tissue w/o incident\par -continue hydrogel\par - RTC 2 weeks\par

## 2019-07-15 NOTE — PHYSICAL EXAM
[General Appearance - Alert] : alert [General Appearance - In No Acute Distress] : in no acute distress [Left Foot Was Examined] : The left foot was examined [Normal] : normal [1+] : 1+ in the dorsalis pedis [Oriented To Time, Place, And Person] : oriented to person, place, and time [Impaired Insight] : insight and judgment were intact [Swelling] : not swollen [Erythema] : not erythematous [Delayed in the Left Toes] : normal in the toes [FreeTextEntry1] : left hallux ulcer  2cm m x 2.5cm granular base

## 2019-07-25 ENCOUNTER — OUTPATIENT (OUTPATIENT)
Dept: OUTPATIENT SERVICES | Facility: HOSPITAL | Age: 69
LOS: 1 days | Discharge: HOME | End: 2019-07-25

## 2019-07-25 ENCOUNTER — APPOINTMENT (OUTPATIENT)
Dept: PODIATRY | Facility: CLINIC | Age: 69
End: 2019-07-25
Payer: MEDICARE

## 2019-07-25 VITALS
HEIGHT: 71 IN | HEART RATE: 84 BPM | BODY MASS INDEX: 29.12 KG/M2 | WEIGHT: 208 LBS | DIASTOLIC BLOOD PRESSURE: 83 MMHG | SYSTOLIC BLOOD PRESSURE: 154 MMHG

## 2019-07-25 DIAGNOSIS — Z89.421 ACQUIRED ABSENCE OF OTHER RIGHT TOE(S): Chronic | ICD-10-CM

## 2019-07-25 PROCEDURE — 11042 DBRDMT SUBQ TIS 1ST 20SQCM/<: CPT | Mod: RT

## 2019-07-26 NOTE — PROCEDURE
[FreeTextEntry1] : -15 blade and curette used to debrided hallux down to level of subcutaneous tissue w/o incident\par -continue Santyl/DSD\par - RTC  2 weeks\par

## 2019-07-26 NOTE — HISTORY OF PRESENT ILLNESS
[FreeTextEntry1] : A 70 y/o male with PMH DM2 returns for management of left hallux ulcer. patient as been applying santyl . no new complaints

## 2019-07-26 NOTE — PHYSICAL EXAM
[General Appearance - Alert] : alert [General Appearance - In No Acute Distress] : in no acute distress [Left Foot Was Examined] : The left foot was examined [Normal] : normal [1+] : 1+ in the dorsalis pedis [Oriented To Time, Place, And Person] : oriented to person, place, and time [Impaired Insight] : insight and judgment were intact [Swelling] : not swollen [Erythema] : not erythematous [Delayed in the Left Toes] : normal in the toes [FreeTextEntry1] : left hallux ulcer  1.5cm m x 2cm granular base

## 2019-08-08 ENCOUNTER — APPOINTMENT (OUTPATIENT)
Dept: PODIATRY | Facility: CLINIC | Age: 69
End: 2019-08-08
Payer: MEDICARE

## 2019-08-08 ENCOUNTER — OUTPATIENT (OUTPATIENT)
Dept: OUTPATIENT SERVICES | Facility: HOSPITAL | Age: 69
LOS: 1 days | Discharge: HOME | End: 2019-08-08

## 2019-08-08 VITALS
HEIGHT: 71 IN | SYSTOLIC BLOOD PRESSURE: 146 MMHG | WEIGHT: 206 LBS | DIASTOLIC BLOOD PRESSURE: 76 MMHG | BODY MASS INDEX: 28.84 KG/M2 | HEART RATE: 86 BPM

## 2019-08-08 DIAGNOSIS — Z89.421 ACQUIRED ABSENCE OF OTHER RIGHT TOE(S): Chronic | ICD-10-CM

## 2019-08-08 PROCEDURE — 11042 DBRDMT SUBQ TIS 1ST 20SQCM/<: CPT

## 2019-08-11 NOTE — PROCEDURE
[FreeTextEntry1] : -ulcer  excisionally debrided of fibrotic/devitalized tissue down to subcutaneous layer. Performed under sterile conditions with curette and scalpel.\par -continue Santyl/DSD (instructed to bring on his follow up visit)\par - RTC  1 week\par

## 2019-08-11 NOTE — PHYSICAL EXAM
[General Appearance - Alert] : alert [General Appearance - In No Acute Distress] : in no acute distress [Left Foot Was Examined] : The left foot was examined [Normal] : normal [1+] : 1+ in the dorsalis pedis [Oriented To Time, Place, And Person] : oriented to person, place, and time [Impaired Insight] : insight and judgment were intact [Swelling] : not swollen [Erythema] : not erythematous [FreeTextEntry1] : left hallux ulcer  1.5cm m x 2cm fibrotic base (worse from previous visit) [Delayed in the Left Toes] : normal in the toes

## 2019-08-15 ENCOUNTER — APPOINTMENT (OUTPATIENT)
Dept: PODIATRY | Facility: CLINIC | Age: 69
End: 2019-08-15
Payer: MEDICARE

## 2019-08-15 ENCOUNTER — OUTPATIENT (OUTPATIENT)
Dept: OUTPATIENT SERVICES | Facility: HOSPITAL | Age: 69
LOS: 1 days | Discharge: HOME | End: 2019-08-15

## 2019-08-15 VITALS
DIASTOLIC BLOOD PRESSURE: 78 MMHG | BODY MASS INDEX: 28.84 KG/M2 | HEART RATE: 96 BPM | HEIGHT: 71 IN | SYSTOLIC BLOOD PRESSURE: 165 MMHG | WEIGHT: 206 LBS

## 2019-08-15 DIAGNOSIS — L89.894 PRESSURE ULCER OF OTHER SITE, STAGE 4: ICD-10-CM

## 2019-08-15 DIAGNOSIS — Z89.421 ACQUIRED ABSENCE OF OTHER RIGHT TOE(S): Chronic | ICD-10-CM

## 2019-08-15 PROCEDURE — 99213 OFFICE O/P EST LOW 20 MIN: CPT

## 2019-08-19 ENCOUNTER — INPATIENT (INPATIENT)
Facility: HOSPITAL | Age: 69
LOS: 1 days | Discharge: HOME HEALTH | End: 2019-08-21
Attending: FAMILY MEDICINE | Admitting: FAMILY MEDICINE
Payer: MEDICARE

## 2019-08-19 VITALS
DIASTOLIC BLOOD PRESSURE: 69 MMHG | TEMPERATURE: 97 F | HEART RATE: 101 BPM | SYSTOLIC BLOOD PRESSURE: 146 MMHG | RESPIRATION RATE: 18 BRPM | OXYGEN SATURATION: 97 %

## 2019-08-19 DIAGNOSIS — Z89.421 ACQUIRED ABSENCE OF OTHER RIGHT TOE(S): Chronic | ICD-10-CM

## 2019-08-19 PROBLEM — L89.894 PRESSURE INJURY OF LEFT FOOT, STAGE 4: Status: ACTIVE | Noted: 2019-08-19

## 2019-08-19 LAB
ALBUMIN SERPL ELPH-MCNC: 3.8 G/DL — SIGNIFICANT CHANGE UP (ref 3.5–5.2)
ALP SERPL-CCNC: 103 U/L — SIGNIFICANT CHANGE UP (ref 30–115)
ALT FLD-CCNC: 14 U/L — SIGNIFICANT CHANGE UP (ref 0–41)
ANION GAP SERPL CALC-SCNC: 17 MMOL/L — HIGH (ref 7–14)
APTT BLD: 32.9 SEC — SIGNIFICANT CHANGE UP (ref 27–39.2)
AST SERPL-CCNC: 11 U/L — SIGNIFICANT CHANGE UP (ref 0–41)
BASOPHILS # BLD AUTO: 0.02 K/UL — SIGNIFICANT CHANGE UP (ref 0–0.2)
BASOPHILS NFR BLD AUTO: 0.2 % — SIGNIFICANT CHANGE UP (ref 0–1)
BILIRUB SERPL-MCNC: 0.6 MG/DL — SIGNIFICANT CHANGE UP (ref 0.2–1.2)
BLD GP AB SCN SERPL QL: SIGNIFICANT CHANGE UP
BUN SERPL-MCNC: 22 MG/DL — HIGH (ref 10–20)
CALCIUM SERPL-MCNC: 9.1 MG/DL — SIGNIFICANT CHANGE UP (ref 8.5–10.1)
CHLORIDE SERPL-SCNC: 99 MMOL/L — SIGNIFICANT CHANGE UP (ref 98–110)
CO2 SERPL-SCNC: 24 MMOL/L — SIGNIFICANT CHANGE UP (ref 17–32)
CREAT SERPL-MCNC: 1.3 MG/DL — SIGNIFICANT CHANGE UP (ref 0.7–1.5)
EOSINOPHIL # BLD AUTO: 0.04 K/UL — SIGNIFICANT CHANGE UP (ref 0–0.7)
EOSINOPHIL NFR BLD AUTO: 0.4 % — SIGNIFICANT CHANGE UP (ref 0–8)
ERYTHROCYTE [SEDIMENTATION RATE] IN BLOOD: 61 MM/HR — HIGH (ref 0–10)
GLUCOSE BLDC GLUCOMTR-MCNC: 159 MG/DL — HIGH (ref 70–99)
GLUCOSE BLDC GLUCOMTR-MCNC: 188 MG/DL — HIGH (ref 70–99)
GLUCOSE SERPL-MCNC: 312 MG/DL — HIGH (ref 70–99)
HCT VFR BLD CALC: 31.5 % — LOW (ref 42–52)
HGB BLD-MCNC: 10.5 G/DL — LOW (ref 14–18)
IMM GRANULOCYTES NFR BLD AUTO: 1 % — HIGH (ref 0.1–0.3)
INR BLD: 1.1 RATIO — SIGNIFICANT CHANGE UP (ref 0.65–1.3)
LYMPHOCYTES # BLD AUTO: 0.58 K/UL — LOW (ref 1.2–3.4)
LYMPHOCYTES # BLD AUTO: 6.2 % — LOW (ref 20.5–51.1)
MCHC RBC-ENTMCNC: 28.7 PG — SIGNIFICANT CHANGE UP (ref 27–31)
MCHC RBC-ENTMCNC: 33.3 G/DL — SIGNIFICANT CHANGE UP (ref 32–37)
MCV RBC AUTO: 86.1 FL — SIGNIFICANT CHANGE UP (ref 80–94)
MONOCYTES # BLD AUTO: 0.55 K/UL — SIGNIFICANT CHANGE UP (ref 0.1–0.6)
MONOCYTES NFR BLD AUTO: 5.9 % — SIGNIFICANT CHANGE UP (ref 1.7–9.3)
NEUTROPHILS # BLD AUTO: 8.08 K/UL — HIGH (ref 1.4–6.5)
NEUTROPHILS NFR BLD AUTO: 86.3 % — HIGH (ref 42.2–75.2)
NRBC # BLD: 0 /100 WBCS — SIGNIFICANT CHANGE UP (ref 0–0)
PLATELET # BLD AUTO: 260 K/UL — SIGNIFICANT CHANGE UP (ref 130–400)
POTASSIUM SERPL-MCNC: 4.2 MMOL/L — SIGNIFICANT CHANGE UP (ref 3.5–5)
POTASSIUM SERPL-SCNC: 4.2 MMOL/L — SIGNIFICANT CHANGE UP (ref 3.5–5)
PROT SERPL-MCNC: 6.6 G/DL — SIGNIFICANT CHANGE UP (ref 6–8)
PROTHROM AB SERPL-ACNC: 12.6 SEC — SIGNIFICANT CHANGE UP (ref 9.95–12.87)
RBC # BLD: 3.66 M/UL — LOW (ref 4.7–6.1)
RBC # FLD: 13.1 % — SIGNIFICANT CHANGE UP (ref 11.5–14.5)
SODIUM SERPL-SCNC: 140 MMOL/L — SIGNIFICANT CHANGE UP (ref 135–146)
WBC # BLD: 9.36 K/UL — SIGNIFICANT CHANGE UP (ref 4.8–10.8)
WBC # FLD AUTO: 9.36 K/UL — SIGNIFICANT CHANGE UP (ref 4.8–10.8)

## 2019-08-19 PROCEDURE — 99285 EMERGENCY DEPT VISIT HI MDM: CPT | Mod: GC

## 2019-08-19 PROCEDURE — 93925 LOWER EXTREMITY STUDY: CPT | Mod: 26

## 2019-08-19 PROCEDURE — 73630 X-RAY EXAM OF FOOT: CPT | Mod: 26,LT

## 2019-08-19 RX ORDER — DEXTROSE 50 % IN WATER 50 %
15 SYRINGE (ML) INTRAVENOUS ONCE
Refills: 0 | Status: DISCONTINUED | OUTPATIENT
Start: 2019-08-19 | End: 2019-08-20

## 2019-08-19 RX ORDER — INSULIN GLARGINE 100 [IU]/ML
45 INJECTION, SOLUTION SUBCUTANEOUS AT BEDTIME
Refills: 0 | Status: DISCONTINUED | OUTPATIENT
Start: 2019-08-19 | End: 2019-08-20

## 2019-08-19 RX ORDER — SODIUM CHLORIDE 9 MG/ML
1000 INJECTION INTRAMUSCULAR; INTRAVENOUS; SUBCUTANEOUS
Refills: 0 | Status: DISCONTINUED | OUTPATIENT
Start: 2019-08-19 | End: 2019-08-20

## 2019-08-19 RX ORDER — INSULIN LISPRO 100/ML
VIAL (ML) SUBCUTANEOUS
Refills: 0 | Status: DISCONTINUED | OUTPATIENT
Start: 2019-08-19 | End: 2019-08-20

## 2019-08-19 RX ORDER — ASPIRIN/CALCIUM CARB/MAGNESIUM 324 MG
81 TABLET ORAL DAILY
Refills: 0 | Status: DISCONTINUED | OUTPATIENT
Start: 2019-08-19 | End: 2019-08-20

## 2019-08-19 RX ORDER — INSULIN LISPRO 100/ML
15 VIAL (ML) SUBCUTANEOUS
Refills: 0 | Status: DISCONTINUED | OUTPATIENT
Start: 2019-08-19 | End: 2019-08-20

## 2019-08-19 RX ORDER — GLUCAGON INJECTION, SOLUTION 0.5 MG/.1ML
1 INJECTION, SOLUTION SUBCUTANEOUS ONCE
Refills: 0 | Status: DISCONTINUED | OUTPATIENT
Start: 2019-08-19 | End: 2019-08-20

## 2019-08-19 RX ORDER — HEPARIN SODIUM 5000 [USP'U]/ML
5000 INJECTION INTRAVENOUS; SUBCUTANEOUS ONCE
Refills: 0 | Status: COMPLETED | OUTPATIENT
Start: 2019-08-19 | End: 2019-08-19

## 2019-08-19 RX ORDER — DEXTROSE 50 % IN WATER 50 %
12.5 SYRINGE (ML) INTRAVENOUS ONCE
Refills: 0 | Status: DISCONTINUED | OUTPATIENT
Start: 2019-08-19 | End: 2019-08-20

## 2019-08-19 RX ORDER — VANCOMYCIN HCL 1 G
1000 VIAL (EA) INTRAVENOUS EVERY 12 HOURS
Refills: 0 | Status: DISCONTINUED | OUTPATIENT
Start: 2019-08-19 | End: 2019-08-20

## 2019-08-19 RX ORDER — VANCOMYCIN HCL 1 G
1000 VIAL (EA) INTRAVENOUS ONCE
Refills: 0 | Status: COMPLETED | OUTPATIENT
Start: 2019-08-19 | End: 2019-08-19

## 2019-08-19 RX ORDER — DEXTROSE 50 % IN WATER 50 %
25 SYRINGE (ML) INTRAVENOUS ONCE
Refills: 0 | Status: DISCONTINUED | OUTPATIENT
Start: 2019-08-19 | End: 2019-08-20

## 2019-08-19 RX ORDER — SERTRALINE 25 MG/1
50 TABLET, FILM COATED ORAL DAILY
Refills: 0 | Status: DISCONTINUED | OUTPATIENT
Start: 2019-08-19 | End: 2019-08-20

## 2019-08-19 RX ORDER — PIPERACILLIN AND TAZOBACTAM 4; .5 G/20ML; G/20ML
3.38 INJECTION, POWDER, LYOPHILIZED, FOR SOLUTION INTRAVENOUS EVERY 8 HOURS
Refills: 0 | Status: DISCONTINUED | OUTPATIENT
Start: 2019-08-19 | End: 2019-08-19

## 2019-08-19 RX ORDER — SODIUM CHLORIDE 9 MG/ML
1000 INJECTION, SOLUTION INTRAVENOUS
Refills: 0 | Status: DISCONTINUED | OUTPATIENT
Start: 2019-08-19 | End: 2019-08-20

## 2019-08-19 RX ORDER — PANTOPRAZOLE SODIUM 20 MG/1
40 TABLET, DELAYED RELEASE ORAL
Refills: 0 | Status: DISCONTINUED | OUTPATIENT
Start: 2019-08-19 | End: 2019-08-20

## 2019-08-19 RX ORDER — LISINOPRIL 2.5 MG/1
40 TABLET ORAL DAILY
Refills: 0 | Status: DISCONTINUED | OUTPATIENT
Start: 2019-08-19 | End: 2019-08-20

## 2019-08-19 RX ORDER — SODIUM CHLORIDE 9 MG/ML
1000 INJECTION, SOLUTION INTRAVENOUS ONCE
Refills: 0 | Status: COMPLETED | OUTPATIENT
Start: 2019-08-19 | End: 2019-08-19

## 2019-08-19 RX ORDER — ATORVASTATIN CALCIUM 80 MG/1
40 TABLET, FILM COATED ORAL AT BEDTIME
Refills: 0 | Status: DISCONTINUED | OUTPATIENT
Start: 2019-08-19 | End: 2019-08-20

## 2019-08-19 RX ORDER — PIPERACILLIN AND TAZOBACTAM 4; .5 G/20ML; G/20ML
3.38 INJECTION, POWDER, LYOPHILIZED, FOR SOLUTION INTRAVENOUS ONCE
Refills: 0 | Status: COMPLETED | OUTPATIENT
Start: 2019-08-19 | End: 2019-08-19

## 2019-08-19 RX ADMIN — Medication 250 MILLIGRAM(S): at 17:58

## 2019-08-19 RX ADMIN — INSULIN GLARGINE 45 UNIT(S): 100 INJECTION, SOLUTION SUBCUTANEOUS at 23:11

## 2019-08-19 RX ADMIN — ATORVASTATIN CALCIUM 40 MILLIGRAM(S): 80 TABLET, FILM COATED ORAL at 23:10

## 2019-08-19 RX ADMIN — SODIUM CHLORIDE 1000 MILLILITER(S): 9 INJECTION, SOLUTION INTRAVENOUS at 15:09

## 2019-08-19 RX ADMIN — PIPERACILLIN AND TAZOBACTAM 200 GRAM(S): 4; .5 INJECTION, POWDER, LYOPHILIZED, FOR SOLUTION INTRAVENOUS at 15:25

## 2019-08-19 RX ADMIN — SODIUM CHLORIDE 75 MILLILITER(S): 9 INJECTION INTRAMUSCULAR; INTRAVENOUS; SUBCUTANEOUS at 21:22

## 2019-08-19 RX ADMIN — SODIUM CHLORIDE 1000 MILLILITER(S): 9 INJECTION, SOLUTION INTRAVENOUS at 17:58

## 2019-08-19 RX ADMIN — PIPERACILLIN AND TAZOBACTAM 3.38 GRAM(S): 4; .5 INJECTION, POWDER, LYOPHILIZED, FOR SOLUTION INTRAVENOUS at 17:38

## 2019-08-19 RX ADMIN — HEPARIN SODIUM 5000 UNIT(S): 5000 INJECTION INTRAVENOUS; SUBCUTANEOUS at 20:57

## 2019-08-19 NOTE — PHYSICAL EXAM
[General Appearance - Alert] : alert [General Appearance - In No Acute Distress] : in no acute distress [Left Foot Was Examined] : The left foot was examined [Normal] : normal [1+] : 1+ in the dorsalis pedis [Oriented To Time, Place, And Person] : oriented to person, place, and time [Impaired Insight] : insight and judgment were intact [Swelling] : not swollen [Erythema] : not erythematous [FreeTextEntry1] : left hallux ulcer  with exposed bone. no purulent drainage, malodorous  [Delayed in the Left Toes] : normal in the toes

## 2019-08-19 NOTE — PROCEDURE
[FreeTextEntry1] : -68 y/o male with worsening of left hallux ulcer. Exposed bone. patient urged to go the ED for admission, IV antibiotics and surgical amputation. Patient was hesitant to go. I explained the risks of delayed care including worsening of infection, sepsis, need for proximal amputation. Patient was prescribed Augmentin in case patient fails to go the ER\par -On admission, needs xrays, arterial duplex, ID consult \par

## 2019-08-19 NOTE — ED PROVIDER NOTE - ATTENDING CONTRIBUTION TO CARE
69M PMH DM HTN HL, sent by Dr Cantrell his podiatrist for partial amputation L 1st toe infection. states he's been on oral antibiotics x 2 days doesn't remember name. reports pain, discharge, foul odor. no trauma. no numbness, weakness. no fever. no n/v. no cp, sob. told he's scheduled for surgery tomorrow. 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

## 2019-08-19 NOTE — H&P ADULT - NSICDXPASTMEDICALHX_GEN_ALL_CORE_FT
PAST MEDICAL HISTORY:  Depression     Diabetic foot ulcer     DM (diabetes mellitus) 12/27/18 hgb aic  11.2    Dyslipidemia     GERD (gastroesophageal reflux disease)     HTN (hypertension)

## 2019-08-19 NOTE — CONSULT NOTE ADULT - ASSESSMENT
Assessment:      Plan: 68 y/o M with PMH of insulin-dependent diabetes mellitus, DFU, PAD and R great toe amputation presenting with infected left great toe infection evaluated for pre-op cardiac clearance for OR for left great toe amputation    Assessment:  No known cardiac disease, no prior cardiac evaluation  Risk factors of DM, HTN, DLD, Family history of premature CAD  METS= 4, no symptoms  Previous EKG from Jan 2019, no abnormalities    Plan:  Obtain EKG  Moderate cardiac risk for left great toe amputation under general anesthesia in OR

## 2019-08-19 NOTE — CONSULT NOTE ADULT - SUBJECTIVE AND OBJECTIVE BOX
Date of Admission: 08-19-19    CHIEF COMPLAINT: Patient is a 69y old  Male who presents with a chief complaint of left great toe infection (19 Aug 2019 20:14)      HPI:  70yo M with PMH of insulin-dependent diabetes mellitus , DFU, and R great toe amputation presenting with infected left great toe infection.   Patient was sent by his podiatrist Dr. Bhatia for admission for IV abx, pre-op eval for amputation of left great toe. Patient denies any known fevers, n/v, CP, SOB, abd pain, or injuries/traumas/falls. Patient has had his wound since 10/18 and has been receiving regular wound care. Has foul odor and left great toe pain.     In ED pt had tachycardia but no fever, WBC nl, Cr 1.3, resolved after he received vancomycin and zosyn and 1 L LR. (19 Aug 2019 20:14)      PAST MEDICAL & SURGICAL HISTORY:  GERD (gastroesophageal reflux disease)  Depression  Diabetic foot ulcer  Dyslipidemia  HTN (hypertension)  DM (diabetes mellitus): 12/27/18 hgb aic  11.2  Toe amputation status, right: (2008)        FAMILY HISTORY:  Family history of ischemic heart disease (IHD) (Father)  Family history of lung cancer (Mother)    [ ] no pertinent family history of premature cardiovascular disease in first degree relatives.    SOCIAL HISTORY:    [  ] Non-smoker  [  ] Ex-Smoker  [x] No alcohol use  [x] No illicit drug use    Allergies: No Known Allergies	    REVIEW OF SYSTEMS:  CONSTITUTIONAL: No fever, weight loss, or fatigue  CARDIOLOGY: No chest pain, shortness of breath or syncopal episodes.   RESPIRATORY: No shortness of breath, wheezing.   NEUROLOGICAL: No weakness, no focal deficits to report.  GI: No BRBPR, no N,V,diarrhea.    PSYCHIATRY: Normal mood and affect.  HEENT: No nasal discharge, no ecchymosis  SKIN: No ecchymosis, no breakdown  MUSCULOSKELETAL: Full range of motion x4.   EXTREM: No leg swelling or erythema.    PHYSICAL EXAM:  T(C): 37.1 (08-19-19 @ 16:51), Max: 37.1 (08-19-19 @ 16:51)  HR: 67 (08-19-19 @ 20:04) (65 - 101)  BP: 157/78 (08-19-19 @ 20:04) (134/63 - 157/78)  RR: 17 (08-19-19 @ 20:04) (17 - 20)  SpO2: 99% (08-19-19 @ 20:04) (97% - 99%)  Wt(kg): --  I&O's Summary      General Appearance: Well appearing, normal for age and gender. 	  Neck: Normal JVP, no bruit.   Eyes: No xanthomalasia, Extra Ocular muscles intact.   Cardiovascular: Regular rate and rhythm S1 S2, No JVD, No murmurs, No edema  Respiratory: Lungs clear to auscultation	  Psychiatry: Alert and oriented x 3, Mood & affect appropriate  Gastrointestinal:  Soft, Non-tender  Skin/Integumen: No rashes, No ecchymoses, No cyanosis	  Neurologic: Non-focal deficits.  Musculoskeletal/ extremities: Normal range of motion, No clubbing, cyanosis or edema  Vascular: Peripheral pulses palpable 2+ bilaterally    LABS:	 	                        10.5   9.36  )-----------( 260      ( 19 Aug 2019 14:58 )             31.5     08-19    140  |  99  |  22<H>  ----------------------------<  312<H>  4.2   |  24  |  1.3    Ca    9.1      19 Aug 2019 14:58    TPro  6.6  /  Alb  3.8  /  TBili  0.6  /  DBili  x   /  AST  11  /  ALT  14  /  AlkPhos  103  08-19        PT/INR - ( 19 Aug 2019 16:55 )   PT: 12.60 sec;   INR: 1.10 ratio         PTT - ( 19 Aug 2019 16:55 )  PTT:32.9 sec    CARDIAC MARKERS:            TELEMETRY EVENTS: 	    ECG:  < from: 12 Lead ECG (01.15.19 @ 16:08) >  Diagnosis Line Normal sinus rhythm  Normal ECG    RADIOLOGY:  OTHER: 	    PREVIOUS DIAGNOSTIC TESTING:    [ ] Echocardiogram:  [ ] Catheterization:  [ ] Stress Test:  	  	  Home Medications:  benazepril 40 mg oral tablet: 1 tab(s) orally once a day (18 Jan 2019 09:51)  Crestor 10 mg oral tablet: 1 tab(s) orally once a day (at bedtime) (18 Jan 2019 09:51)  HumuLIN 70/30 KwikPen 70 units-30 units/mL subcutaneous suspension: 50 unit(s) subcutaneous once a day before breakfast (18 Jan 2019 09:51)  HumuLIN 70/30 subcutaneous suspension: 40 unit(s) subcutaneous once a day (at bedtime) (18 Jan 2019 09:51)  Janumet 50 mg-1000 mg oral tablet: 1 tab(s) orally 2 times a day (18 Jan 2019 09:51)  omeprazole 20 mg oral delayed release capsule: 1 cap(s) orally once a day (18 Jan 2019 09:51)  sertraline 50 mg oral tablet: 1 tab(s) orally once a day (18 Jan 2019 09:51)    MEDICATIONS  (STANDING):  aspirin enteric coated 81 milliGRAM(s) Oral daily  atorvastatin 40 milliGRAM(s) Oral at bedtime  dextrose 5%. 1000 milliLiter(s) (50 mL/Hr) IV Continuous <Continuous>  dextrose 50% Injectable 12.5 Gram(s) IV Push once  dextrose 50% Injectable 25 Gram(s) IV Push once  dextrose 50% Injectable 25 Gram(s) IV Push once  insulin glargine Injectable (LANTUS) 45 Unit(s) SubCutaneous at bedtime  insulin lispro (HumaLOG) corrective regimen sliding scale   SubCutaneous three times a day before meals  insulin lispro Injectable (HumaLOG) 15 Unit(s) SubCutaneous three times a day before meals  lisinopril 40 milliGRAM(s) Oral daily  pantoprazole    Tablet 40 milliGRAM(s) Oral before breakfast  sertraline 50 milliGRAM(s) Oral daily  sodium chloride 0.9%. 1000 milliLiter(s) (75 mL/Hr) IV Continuous <Continuous>  vancomycin  IVPB 1000 milliGRAM(s) IV Intermittent every 12 hours    MEDICATIONS  (PRN):  dextrose 40% Gel 15 Gram(s) Oral once PRN Blood Glucose LESS THAN 70 milliGRAM(s)/deciliter  glucagon  Injectable 1 milliGRAM(s) IntraMuscular once PRN Glucose LESS THAN 70 milligrams/deciliter Date of Admission: 08-19-19    CHIEF COMPLAINT: Patient is a 69y old  Male who presents with a chief complaint of left great toe infection (19 Aug 2019 20:14)      HPI:  70 y/o M with PMH of insulin-dependent diabetes mellitus, DFU, PAD and R great toe amputation presenting with infected left great toe infection.   Patient was sent by his podiatrist Dr. Bhatia for admission for IV abx, pre-op eval for amputation of left great toe. Patient denies any known fevers, n/v, CP, SOB, abd pain, or injuries/traumas/falls. Patient has had his wound since 10/18 and has been receiving regular wound care. Has foul odor and left great toe pain.     In ED pt had tachycardia but no fever, WBC nl, Cr 1.3, resolved after he received vancomycin and zosyn and 1 L LR. (19 Aug 2019 20:14)    Patient denies previous cardiac history nor having seen cardiologist outpatient. Pt denies chest pain, dyspnea on exertion, palpitations on exertion. Patient states that he can walk a few blocks and go up 2 flights of stair without symptoms.    PAST MEDICAL & SURGICAL HISTORY:  GERD (gastroesophageal reflux disease)  Depression  Diabetic foot ulcer  Dyslipidemia  HTN (hypertension)  DM (diabetes mellitus): 12/27/18 hgb aic  11.2  Toe amputation status, right: (2008)        FAMILY HISTORY:  Family history of ischemic heart disease (IHD) (Father), had massive heart attack at age 44  Family history of lung cancer (Mother)      SOCIAL HISTORY:    [x] Non-smoker  [x] No alcohol use  [x] No illicit drug use    Allergies: No Known Allergies	    REVIEW OF SYSTEMS:  CONSTITUTIONAL: No fever, weight loss, or fatigue  CARDIOLOGY: No chest pain, shortness of breath or syncopal episodes.   RESPIRATORY: No shortness of breath, wheezing.   NEUROLOGICAL: No weakness, no focal deficits to report.  GI: No BRBPR, no N,V,diarrhea.    PSYCHIATRY: Normal mood and affect.  HEENT: No nasal discharge, no ecchymosis  SKIN: No ecchymosis, no breakdown  MUSCULOSKELETAL: Full range of motion x4. Left toe wound. s/p R toe amputation.  EXTREM: No leg swelling or erythema.    PHYSICAL EXAM:  T(C): 37.1 (08-19-19 @ 16:51), Max: 37.1 (08-19-19 @ 16:51)  HR: 67 (08-19-19 @ 20:04) (65 - 101)  BP: 157/78 (08-19-19 @ 20:04) (134/63 - 157/78)  RR: 17 (08-19-19 @ 20:04) (17 - 20)  SpO2: 99% (08-19-19 @ 20:04) (97% - 99%)  Wt(kg): --  I&O's Summary      General Appearance: Well appearing, normal for age and gender. 	  Neck: Normal JVP, no bruit.   Eyes: No xanthomalasia, Extra Ocular muscles intact.   Cardiovascular: Regular rate and rhythm S1 S2, No JVD, No murmurs, No edema  Respiratory: Lungs clear to auscultation	  Psychiatry: Alert and oriented x 3, Mood & affect appropriate  Gastrointestinal:  Soft, Non-tender  Skin/Integumen: No rashes, No ecchymoses, No cyanosis	  Neurologic: Non-focal deficits.  Musculoskeletal/ extremities: Normal range of motion, No clubbing, cyanosis or edema.  Left toe wound. s/p R toe amputation.  Vascular: R LE tibial pulses palpable. L LE unable to palpate due to dressing (known PAD)    LABS:	 	                        10.5   9.36  )-----------( 260      ( 19 Aug 2019 14:58 )             31.5     08-19    140  |  99  |  22<H>  ----------------------------<  312<H>  4.2   |  24  |  1.3    Ca    9.1      19 Aug 2019 14:58    TPro  6.6  /  Alb  3.8  /  TBili  0.6  /  DBili  x   /  AST  11  /  ALT  14  /  AlkPhos  103  08-19        PT/INR - ( 19 Aug 2019 16:55 )   PT: 12.60 sec;   INR: 1.10 ratio         PTT - ( 19 Aug 2019 16:55 )  PTT:32.9 sec    CARDIAC MARKERS:            TELEMETRY EVENTS: 	    ECG:  < from: 12 Lead ECG (01.15.19 @ 16:08) >  Diagnosis Line Normal sinus rhythm  Normal ECG    RADIOLOGY:   OTHER: 	    PREVIOUS DIAGNOSTIC TESTING:    [ ] Echocardiogram:  [ ] Catheterization:  [ ] Stress Test:  	  	  Home Medications:  benazepril 40 mg oral tablet: 1 tab(s) orally once a day (18 Jan 2019 09:51)  Crestor 10 mg oral tablet: 1 tab(s) orally once a day (at bedtime) (18 Jan 2019 09:51)  HumuLIN 70/30 KwikPen 70 units-30 units/mL subcutaneous suspension: 50 unit(s) subcutaneous once a day before breakfast (18 Jan 2019 09:51)  HumuLIN 70/30 subcutaneous suspension: 40 unit(s) subcutaneous once a day (at bedtime) (18 Jan 2019 09:51)  Janumet 50 mg-1000 mg oral tablet: 1 tab(s) orally 2 times a day (18 Jan 2019 09:51)  omeprazole 20 mg oral delayed release capsule: 1 cap(s) orally once a day (18 Jan 2019 09:51)  sertraline 50 mg oral tablet: 1 tab(s) orally once a day (18 Jan 2019 09:51)    MEDICATIONS  (STANDING):  aspirin enteric coated 81 milliGRAM(s) Oral daily  atorvastatin 40 milliGRAM(s) Oral at bedtime  dextrose 5%. 1000 milliLiter(s) (50 mL/Hr) IV Continuous <Continuous>  dextrose 50% Injectable 12.5 Gram(s) IV Push once  dextrose 50% Injectable 25 Gram(s) IV Push once  dextrose 50% Injectable 25 Gram(s) IV Push once  insulin glargine Injectable (LANTUS) 45 Unit(s) SubCutaneous at bedtime  insulin lispro (HumaLOG) corrective regimen sliding scale   SubCutaneous three times a day before meals  insulin lispro Injectable (HumaLOG) 15 Unit(s) SubCutaneous three times a day before meals  lisinopril 40 milliGRAM(s) Oral daily  pantoprazole    Tablet 40 milliGRAM(s) Oral before breakfast  sertraline 50 milliGRAM(s) Oral daily  sodium chloride 0.9%. 1000 milliLiter(s) (75 mL/Hr) IV Continuous <Continuous>  vancomycin  IVPB 1000 milliGRAM(s) IV Intermittent every 12 hours    MEDICATIONS  (PRN):  dextrose 40% Gel 15 Gram(s) Oral once PRN Blood Glucose LESS THAN 70 milliGRAM(s)/deciliter  glucagon  Injectable 1 milliGRAM(s) IntraMuscular once PRN Glucose LESS THAN 70 milligrams/deciliter Date of Admission: 08-19-19    CHIEF COMPLAINT: Patient is a 69y old  Male who presents with a chief complaint of left great toe infection (19 Aug 2019 20:14)      HPI:  70 y/o M with PMH of insulin-dependent diabetes mellitus, DFU, PAD and R great toe amputation presenting with infected left great toe infection.   Patient was sent by his podiatrist Dr. Bhatia for admission for IV abx, pre-op eval for amputation of left great toe. Patient denies any known fevers, n/v, CP, SOB, abd pain, or injuries/traumas/falls. Patient has had his wound since 10/18 and has been receiving regular wound care. Has foul odor and left great toe pain.     In ED pt had tachycardia but no fever, WBC nl, Cr 1.3, resolved after he received vancomycin and zosyn and 1 L LR. (19 Aug 2019 20:14)    Patient denies previous cardiac history nor having seen cardiologist outpatient. Pt denies chest pain, dyspnea on exertion, palpitations on exertion. Patient states that he can walk a few blocks and go up 2 flights of stair without symptoms.    PAST MEDICAL & SURGICAL HISTORY:  GERD (gastroesophageal reflux disease)  Depression  Diabetic foot ulcer  Dyslipidemia  HTN (hypertension)  DM (diabetes mellitus): 12/27/18 hgb aic  11.2  Toe amputation status, right: (2008)      FAMILY HISTORY:  Family history of ischemic heart disease (IHD) (Father), had massive heart attack at age 44  Family history of lung cancer (Mother)      SOCIAL HISTORY:    [x] Non-smoker  [x] No alcohol use  [x] No illicit drug use    Allergies: No Known Allergies	    REVIEW OF SYSTEMS:  CONSTITUTIONAL: No fever, weight loss, or fatigue  CARDIOLOGY: No chest pain, shortness of breath or syncopal episodes.   RESPIRATORY: No shortness of breath, wheezing.   NEUROLOGICAL: No weakness, no focal deficits to report.  GI: No BRBPR, no N,V,diarrhea.    PSYCHIATRY: Normal mood and affect.  HEENT: No nasal discharge, no ecchymosis  SKIN: No ecchymosis, no breakdown  MUSCULOSKELETAL: Full range of motion x4. Left toe wound. s/p R toe amputation.  EXTREM: No leg swelling or erythema.    PHYSICAL EXAM:  T(C): 37.1 (08-19-19 @ 16:51), Max: 37.1 (08-19-19 @ 16:51)  HR: 67 (08-19-19 @ 20:04) (65 - 101)  BP: 157/78 (08-19-19 @ 20:04) (134/63 - 157/78)  RR: 17 (08-19-19 @ 20:04) (17 - 20)  SpO2: 99% (08-19-19 @ 20:04) (97% - 99%)  Wt(kg): --  I&O's Summary      General Appearance: Well appearing, normal for age and gender. 	  Neck: Normal JVP, no bruit.   Eyes: No xanthomalasia, Extra Ocular muscles intact.   Cardiovascular: Regular rate and rhythm S1 S2, No JVD, No murmurs, No edema  Respiratory: Lungs clear to auscultation	  Psychiatry: Alert and oriented x 3, Mood & affect appropriate  Gastrointestinal:  Soft, Non-tender  Skin/Integumen: No rashes, No ecchymoses, No cyanosis	  Neurologic: Non-focal deficits.  Musculoskeletal/ extremities: Normal range of motion, No clubbing, cyanosis or edema.  Left toe wound. s/p R toe amputation.  Vascular: R LE tibial pulses palpable. L LE unable to palpate due to dressing (known PAD)    LABS:	 	                        10.5   9.36  )-----------( 260      ( 19 Aug 2019 14:58 )             31.5     08-19    140  |  99  |  22<H>  ----------------------------<  312<H>  4.2   |  24  |  1.3    Ca    9.1      19 Aug 2019 14:58    TPro  6.6  /  Alb  3.8  /  TBili  0.6  /  DBili  x   /  AST  11  /  ALT  14  /  AlkPhos  103  08-19        PT/INR - ( 19 Aug 2019 16:55 )   PT: 12.60 sec;   INR: 1.10 ratio         PTT - ( 19 Aug 2019 16:55 )  PTT:32.9 sec    CARDIAC MARKERS:            TELEMETRY EVENTS: 	    ECG:  < from: 12 Lead ECG (01.15.19 @ 16:08) >  Diagnosis Line Normal sinus rhythm  Normal ECG    RADIOLOGY:   OTHER: 	    PREVIOUS DIAGNOSTIC TESTING:    [ ] Echocardiogram:  [ ] Catheterization:  [ ] Stress Test:  	  	  Home Medications:  benazepril 40 mg oral tablet: 1 tab(s) orally once a day (18 Jan 2019 09:51)  Crestor 10 mg oral tablet: 1 tab(s) orally once a day (at bedtime) (18 Jan 2019 09:51)  HumuLIN 70/30 KwikPen 70 units-30 units/mL subcutaneous suspension: 50 unit(s) subcutaneous once a day before breakfast (18 Jan 2019 09:51)  HumuLIN 70/30 subcutaneous suspension: 40 unit(s) subcutaneous once a day (at bedtime) (18 Jan 2019 09:51)  Janumet 50 mg-1000 mg oral tablet: 1 tab(s) orally 2 times a day (18 Jan 2019 09:51)  omeprazole 20 mg oral delayed release capsule: 1 cap(s) orally once a day (18 Jan 2019 09:51)  sertraline 50 mg oral tablet: 1 tab(s) orally once a day (18 Jan 2019 09:51)    MEDICATIONS  (STANDING):  aspirin enteric coated 81 milliGRAM(s) Oral daily  atorvastatin 40 milliGRAM(s) Oral at bedtime  dextrose 5%. 1000 milliLiter(s) (50 mL/Hr) IV Continuous <Continuous>  dextrose 50% Injectable 12.5 Gram(s) IV Push once  dextrose 50% Injectable 25 Gram(s) IV Push once  dextrose 50% Injectable 25 Gram(s) IV Push once  insulin glargine Injectable (LANTUS) 45 Unit(s) SubCutaneous at bedtime  insulin lispro (HumaLOG) corrective regimen sliding scale   SubCutaneous three times a day before meals  insulin lispro Injectable (HumaLOG) 15 Unit(s) SubCutaneous three times a day before meals  lisinopril 40 milliGRAM(s) Oral daily  pantoprazole    Tablet 40 milliGRAM(s) Oral before breakfast  sertraline 50 milliGRAM(s) Oral daily  sodium chloride 0.9%. 1000 milliLiter(s) (75 mL/Hr) IV Continuous <Continuous>  vancomycin  IVPB 1000 milliGRAM(s) IV Intermittent every 12 hours    MEDICATIONS  (PRN):  dextrose 40% Gel 15 Gram(s) Oral once PRN Blood Glucose LESS THAN 70 milliGRAM(s)/deciliter  glucagon  Injectable 1 milliGRAM(s) IntraMuscular once PRN Glucose LESS THAN 70 milligrams/deciliter

## 2019-08-19 NOTE — ED PROVIDER NOTE - PHYSICAL EXAMINATION
PHYSICAL EXAM: I have reviewed current vital signs.  GENERAL: NAD.  HEAD:  Normocephalic, atraumatic.  EYES: Conjunctiva and sclera clear.  ENT: MMM.  NECK: Supple, full ROM.  CHEST/LUNG: Clear to auscultation bilaterally; no wheezes, rales, or rhonchi.  HEART: Regular rate and rhythm, normal S1 and S2; no murmurs, rubs, or gallops.  ABDOMEN: Soft, nontender, nondistended.  EXTREMITIES:  2+ peripheral pulses; left great toe- 2.5x2.5cm wound to plantar aspect, foul smelling, appears necrotic, foot warm to the touch. S/p R great toe amputation.  PSYCH: Cooperative, appropriate, normal mood and affect.  NEUROLOGY: A&O x 3. Motor 5/5. No focal neurological deficits.   SKIN: Warm and dry. PHYSICAL EXAM: I have reviewed current vital signs.  GENERAL: NAD.  HEAD:  Normocephalic, atraumatic.  EYES: Conjunctiva and sclera clear.  ENT: MMM.  NECK: Supple, full ROM.  CHEST/LUNG: Clear to auscultation bilaterally; no wheezes, rales, or rhonchi.  HEART: Regular rate and rhythm, normal S1 and S2; no murmurs, rubs, or gallops.  ABDOMEN: Soft, nontender, nondistended.  EXTREMITIES:  2+ peripheral pulses; left great toe- 2.5x2.5cm wound to plantar aspect, foul smelling, appears necrotic, foot warm to the touch. S/p R great toe amputation. Palpable DPs bilaterally.  PSYCH: Cooperative, appropriate, normal mood and affect.  NEUROLOGY: A&O x 3. Motor 5/5. No focal neurological deficits.   SKIN: Warm and dry.

## 2019-08-19 NOTE — CONSULT NOTE ADULT - SUBJECTIVE AND OBJECTIVE BOX
JOSÉ MANUEL PORTER [id number]  69y Male      HPI:  68yo M with PMH of DM (insulin dependent), DFU, and R great toe amputation presenting with infected left great toe wound. Patient was sent by his podiatrist Dr. Bhatia for admission for IV abx, pre-op eval, and future amputation. Patient denies any known fevers, n/v, CP, SOB, abd pain, or injuries/traumas/falls. Patient has had his wound since 10/18 and has been receiving regular wound care. Has foul odor and left great toe pain. Podiatrist would like vascular eval as well. No cardiac hx.    PAST MEDICAL & SURGICAL HISTORY:  GERD (gastroesophageal reflux disease)  Depression  Diabetic foot ulcer  Dyslipidemia  HTN (hypertension)  DM (diabetes mellitus): 12/27/18 hgb aic  11.2  Toe amputation status, right: (2008)        MEDICATIONS  (STANDING):    MEDICATIONS  (PRN):      Allergies    No Known Allergies    Intolerances        REVIEW OF SYSTEMS    [x] A ten-point review of systems was otherwise negative except as noted.  [ ] Due to altered mental status/intubation, subjective information were not able to be obtained from the patient. History was obtained, to the extent possible, from review of the chart and collateral sources of information.      Vital Signs Last 24 Hrs  T(C): 37.1 (19 Aug 2019 16:51), Max: 37.1 (19 Aug 2019 16:51)  T(F): 98.8 (19 Aug 2019 16:51), Max: 98.8 (19 Aug 2019 16:51)  HR: 65 (19 Aug 2019 16:51) (65 - 101)  BP: 134/63 (19 Aug 2019 16:51) (134/63 - 146/69)  BP(mean): --  RR: 20 (19 Aug 2019 16:51) (18 - 20)  SpO2: 99% (19 Aug 2019 16:51) (97% - 99%)    PHYSICAL EXAM:  GENERAL: NAD, well-appearing  CHEST/LUNG: Clear to auscultation bilaterally  HEART: Regular rate and rhythm  ABDOMEN: Soft, Nontender, Nondistended;   EXTREMITIES:  Left great toe gangrene  PULSES: palpable DP, PT      LABS:  Labs:  CAPILLARY BLOOD GLUCOSE                              10.5   9.36  )-----------( 260      ( 19 Aug 2019 14:58 )             31.5       Auto Neutrophil %: 86.3 % (08-19-19 @ 14:58)  Auto Immature Granulocyte %: 1.0 % (08-19-19 @ 14:58)    08-19    140  |  99  |  22<H>  ----------------------------<  312<H>  4.2   |  24  |  1.3      Calcium, Total Serum: 9.1 mg/dL (08-19-19 @ 14:58)      LFTs:             6.6  | 0.6  | 11       ------------------[103     ( 19 Aug 2019 14:58 )  3.8  | x    | 14          Lipase:x      Amylase:x             Coags:     12.60  ----< 1.10    ( 19 Aug 2019 16:55 )     32.9                        RADIOLOGY & ADDITIONAL STUDIES:

## 2019-08-19 NOTE — H&P ADULT - ATTENDING COMMENTS
70yo M with PMH of insulin-dependent diabetes mellitus , DFU, and R great toe amputation presenting with infected left great toe infection.   Patient was sent by his podiatrist Dr. Bhatia for admission for IV abx, pre-op eval for amputation of left great toe. Patient denies any known fevers, n/v, CP, SOB, abd pain, or injuries/traumas/falls. Patient has had his wound since 10/18 and has been receiving regular wound care. Has foul odor and left great toe pain.     In ED pt had tachycardia but no fever, WBC nl, Cr 1.3, resolved after he received vancomycin and zosyn and 1 L LR.    REVIEW OF SYSTEMS: see cc/HPI  CONSTITUTIONAL: No weakness, fevers or chills  EYES/ENT: No visual changes;  No vertigo or throat pain   NECK: No pain or stiffness  RESPIRATORY: No cough, wheezing, hemoptysis; No shortness of breath  CARDIOVASCULAR: No chest pain or palpitations  GASTROINTESTINAL: No abdominal or epigastric pain. No nausea, vomiting, or hematemesis; No diarrhea or constipation. No melena or hematochezia.  GENITOURINARY: No dysuria, frequency or hematuria  NEUROLOGICAL: No numbness or weakness  SKIN: No itching, rashes, see cc/HPI    Physical Exam:  General: WN/WD NAD  Neurology: A&Ox3, nonfocal, follows commands  Eyes: PERRLA/ EOMI  ENT/Neck: Neck supple, trachea midline, No JVD  Respiratory: CTA B/L, No wheezing, rales, rhonchi  CV: Normal rate regular rhythm, S1S2, no murmurs, rubs or gallops  Abdominal: Soft, NT, ND +BS,   Extremities: No edema, + peripheral pulses  Skin: No Rashes, Hematoma, Ecchymosis  Incisions: n/a  Tubes: n/a    A/P  Infected Left Hallus/Osteomyelitis scheduled for L partial hallux amputation  -f/u w/ Vascular recs  -IV Abx and ID eval   -NPO and IV fluids for OR    DM type II / Dyslipidemia   -check HgA1c and F/S   -insulin sliding scale   -statin    HTN  -c/w ACEI 70yo M with PMH of insulin-dependent diabetes mellitus , DFU, and R great toe amputation presenting with infected left great toe infection.   Patient was sent by his podiatrist Dr. Bhatia for admission for IV abx, pre-op eval for amputation of left great toe. Patient denies any known fevers, n/v, CP, SOB, abd pain, or injuries/traumas/falls. Patient has had his wound since 10/18 and has been receiving regular wound care. Has foul odor and left great toe pain.     In ED pt had tachycardia but no fever, WBC nl, Cr 1.3, resolved after he received vancomycin and zosyn and 1 L LR.    EKG - NSR @ 83 w/o acute changes ( Jan15th, 2019)    EKG - 8/20/19 -pending   REVIEW OF SYSTEMS: see cc/HPI  CONSTITUTIONAL: No weakness, fevers or chills  EYES/ENT: No visual changes;  No vertigo or throat pain   NECK: No pain or stiffness  RESPIRATORY: No cough, wheezing, hemoptysis; No shortness of breath  CARDIOVASCULAR: No chest pain or palpitations  GASTROINTESTINAL: No abdominal or epigastric pain. No nausea, vomiting, or hematemesis; No diarrhea or constipation. No melena or hematochezia.  GENITOURINARY: No dysuria, frequency or hematuria  NEUROLOGICAL: No numbness or weakness  SKIN: No itching, rashes, see cc/HPI    Physical Exam:  General: WN/WD NAD  Neurology: A&Ox3, nonfocal, follows commands  Eyes: PERRLA/ EOMI  ENT/Neck: Neck supple, trachea midline, No JVD  Respiratory: CTA B/L, No wheezing, rales, rhonchi  CV: Normal rate regular rhythm, S1S2, no murmurs, rubs or gallops  Abdominal: Soft, NT, ND +BS,   Extremities: No edema, + peripheral pulses  Skin: No Rashes, Hematoma, Ecchymosis  Incisions: n/a  Tubes: n/a    A/P  Infected Left Hallus/Osteomyelitis scheduled for L partial hallux amputation  -f/u w/ Vascular recs  -IV Abx and ID eval   -NPO and IV fluids for OR  -Podiatry referred and on case --> OR     DM type II / Dyslipidemia   -check HgA1c and F/S monitoring   -insulin sliding scale   -statin    HTN  -c/w ACEI    DVT prophylaxis held for OR    Patient w/ good exercise tolerance, > 4 METS and no contraindication to surgery. May proceed, patient is low risk for per-op event. 70yo M with PMH of insulin-dependent diabetes mellitus , DFU, and R great toe amputation presenting with infected left great toe infection.   Patient was sent by his podiatrist Dr. Bhatia for admission for IV abx, pre-op eval for amputation of left great toe. Patient denies any known fevers, n/v, CP, SOB, abd pain, or injuries/traumas/falls. Patient has had his wound since 10/18 and has been receiving regular wound care. Has foul odor and left great toe pain.     In ED pt had tachycardia but no fever, WBC nl, Cr 1.3, resolved after he received vancomycin and zosyn and 1 L LR.    EKG - NSR @ 83 w/o acute changes ( Jan15th, 2019)    EKG - NSR @72 w/ NS T wave flattening     REVIEW OF SYSTEMS: see cc/HPI  CONSTITUTIONAL: No weakness, fevers or chills  EYES/ENT: No visual changes;  No vertigo or throat pain   NECK: No pain or stiffness  RESPIRATORY: No cough, wheezing, hemoptysis; No shortness of breath  CARDIOVASCULAR: No chest pain or palpitations  GASTROINTESTINAL: No abdominal or epigastric pain. No nausea, vomiting, or hematemesis; No diarrhea or constipation. No melena or hematochezia.  GENITOURINARY: No dysuria, frequency or hematuria  NEUROLOGICAL: No numbness or weakness  SKIN: No itching, rashes, see cc/HPI    Physical Exam:  General: WN/WD NAD  Neurology: A&Ox3, nonfocal, follows commands  Eyes: PERRLA/ EOMI  ENT/Neck: Neck supple, trachea midline, No JVD  Respiratory: CTA B/L, No wheezing, rales, rhonchi  CV: Normal rate regular rhythm, S1S2, no murmurs, rubs or gallops  Abdominal: Soft, NT, ND +BS,   Extremities: No edema, + peripheral pulses, LLE - distal leg /foot discoloration, L hallux swollen , erythematous and foul smelling discharge  Skin: No Rashes, Hematoma, Ecchymosis, LLE hallux - DFU  Incisions: n/a  Tubes: n/a    A/P  Infected Left Hallus/Osteomyelitis scheduled for L partial hallux amputation  -f/u w/ Vascular recs  -IV Abx and ID eval   -NPO and IV fluids for OR  -Podiatry referred and on case --> OR     DM type II / Dyslipidemia   -check HgA1c and F/S monitoring   -insulin sliding scale   -statin    HTN  -c/w ACEI    DVT prophylaxis held for OR    Patient w/ good exercise tolerance, > 4 METS and no contraindication to surgery. May proceed, patient is low risk for per-op event.

## 2019-08-19 NOTE — CONSULT NOTE ADULT - SUBJECTIVE AND OBJECTIVE BOX
Podiatry Consult Note    Subjective:   JOSÉ MANUEL PORTER is a pleasant well-nourished, well developed 69y Male in no acute distress, alert awake, and oriented to person, place and time.   Patient is a 69y old  Male who presents with a chief complaint of Necrotic Left Hallux.     Past Medical History and Surgical History  GERD (gastroesophageal reflux disease)  Depression  Diabetic foot ulcer  Dyslipidemia  HTN (hypertension)  DM (diabetes mellitus): 12/27/18 hgb aic  11.2  Toe amputation status, right: (2008)     Review of Systems:   Constitutional: No weakness, fevers or chills  Eyes / ENT: No visual changes; No vertigo or throat pain   Neck: No pain or stiffness  Respiratory: No cough, wheezing, hemoptysis; No shortness of breath  Cardiovascular: No chest pain or palpitations  Gastrointestinal: No abdominal or epigastric pain. No nausea, vomiting, or hematemesis; No diarrhea or constipation. No melena or hematochezia.  Genitourinary: No dysuria, frequency or hematuria  Neurological: No numbness or weakness  Skin: Necrotic Left 2nd Hallux     Objective:  Vital Signs Last 24 Hrs  T(C): 36.1 (19 Aug 2019 13:08), Max: 36.1 (19 Aug 2019 13:08)  T(F): 97 (19 Aug 2019 13:08), Max: 97 (19 Aug 2019 13:08)  HR: 101 (19 Aug 2019 13:08) (101 - 101)  BP: 146/69 (19 Aug 2019 13:08) (146/69 - 146/69)  BP(mean): --  RR: 18 (19 Aug 2019 13:08) (18 - 18)  SpO2: 97% (19 Aug 2019 13:08) (97% - 97%)    Physical Exam - Lower Extremity Focused:   Derm:   Necrotic Left Hallux   Macerated wound edges w/ no clear margins   Mild drainage w/ mal odor present  Probes to bone     Vascular:   DP and PT Pulses diminished Left     Neuro:  Protective sensation mildly intact     MSK:   Manual Muscle strength 3/5 in all muscle compartments B/L    Assessment:   Necrotic / Gangrenous Left Hallux     Plan:  Chart reviewed and Patient evaluated. All questions and concerns were addressed answered.   Wound irrigated w/ Peroxide and flushed w/ Normal Saline; Betadine / DSD / Kerlix   Wound Culture Obtained; Sent to Pathology; Pending   XR Imaging Ordered; Pending   A-Duplex Ordered; Pending; If results are abnormal please Consult Vascular   Cont IV. Abx; as per ED   Request Medical / Cardio Clearance and OR Stratification  Surgery Scheduled for Wednesday 8/21 @ 7:30AM w/ Dr. Bhatia for Partial Hallux Amputation Left Foot   NPO @ Midnight Tuesday 8/20   Attending Updated and Aware Podiatry Consult Note    Subjective:   JOSÉ MANUEL PORTER is a pleasant well-nourished, well developed 69y Male in no acute distress, alert awake, and oriented to person, place and time.   Patient is a 69y old  Male who presents with a chief complaint of Necrotic Left Hallux.     Past Medical History and Surgical History  GERD (gastroesophageal reflux disease)  Depression  Diabetic foot ulcer  Dyslipidemia  HTN (hypertension)  DM (diabetes mellitus): 12/27/18 hgb aic  11.2  Toe amputation status, right: (2008)     Review of Systems:   Constitutional: No weakness, fevers or chills  Eyes / ENT: No visual changes; No vertigo or throat pain   Neck: No pain or stiffness  Respiratory: No cough, wheezing, hemoptysis; No shortness of breath  Cardiovascular: No chest pain or palpitations  Gastrointestinal: No abdominal or epigastric pain. No nausea, vomiting, or hematemesis; No diarrhea or constipation. No melena or hematochezia.  Genitourinary: No dysuria, frequency or hematuria  Neurological: No numbness or weakness  Skin: Necrotic Left 2nd Hallux     Objective:  Vital Signs Last 24 Hrs  T(C): 36.1 (19 Aug 2019 13:08), Max: 36.1 (19 Aug 2019 13:08)  T(F): 97 (19 Aug 2019 13:08), Max: 97 (19 Aug 2019 13:08)  HR: 101 (19 Aug 2019 13:08) (101 - 101)  BP: 146/69 (19 Aug 2019 13:08) (146/69 - 146/69)  BP(mean): --  RR: 18 (19 Aug 2019 13:08) (18 - 18)  SpO2: 97% (19 Aug 2019 13:08) (97% - 97%)    Physical Exam - Lower Extremity Focused:   Derm:   Necrotic Left Hallux   Macerated wound edges w/ no clear margins   Mild drainage w/ mal odor present  Probes to bone     Vascular:   DP and PT Pulses diminished Left     Neuro:  Protective sensation mildly intact     MSK:   Manual Muscle strength 3/5 in all muscle compartments B/L    Assessment:   Necrotic / Gangrenous Left Hallux     Plan:  Chart reviewed and Patient evaluated. All questions and concerns were addressed answered.   Wound irrigated w/ Peroxide and flushed w/ Normal Saline; Betadine / DSD / Kerlix   Wound Culture Obtained; Sent to Pathology; Pending   XR Imaging Ordered; Pending   A-Duplex Ordered; Pending; If results are abnormal please Consult Vascular   Cont IV. Abx; as per ED   Request Medical / Cardio Clearance and OR Stratification  Surgery Scheduled for Wednesday 8/21 @ 7:30AM w/ Dr. Bhatia for Partial Hallux Amputation Left Foot    Attending Updated and Aware Podiatry Consult Note    Subjective:   JOSÉ MANUEL PORTER is a pleasant well-nourished, well developed 69y Male in no acute distress, alert awake, and oriented to person, place and time.   Patient is a 69y old  Male who presents with a chief complaint of Necrotic Left Hallux.     Past Medical History and Surgical History  GERD (gastroesophageal reflux disease)  Depression  Diabetic foot ulcer  Dyslipidemia  HTN (hypertension)  DM (diabetes mellitus): 12/27/18 hgb aic  11.2  Toe amputation status, right: (2008)     Review of Systems:   Constitutional: No weakness, fevers or chills  Eyes / ENT: No visual changes; No vertigo or throat pain   Neck: No pain or stiffness  Respiratory: No cough, wheezing, hemoptysis; No shortness of breath  Cardiovascular: No chest pain or palpitations  Gastrointestinal: No abdominal or epigastric pain. No nausea, vomiting, or hematemesis; No diarrhea or constipation. No melena or hematochezia.  Genitourinary: No dysuria, frequency or hematuria  Neurological: No numbness or weakness  Skin: Necrotic Left 2nd Hallux     Objective:  Vital Signs Last 24 Hrs  T(C): 36.1 (19 Aug 2019 13:08), Max: 36.1 (19 Aug 2019 13:08)  T(F): 97 (19 Aug 2019 13:08), Max: 97 (19 Aug 2019 13:08)  HR: 101 (19 Aug 2019 13:08) (101 - 101)  BP: 146/69 (19 Aug 2019 13:08) (146/69 - 146/69)  BP(mean): --  RR: 18 (19 Aug 2019 13:08) (18 - 18)  SpO2: 97% (19 Aug 2019 13:08) (97% - 97%)    Physical Exam - Lower Extremity Focused:   Derm:   Necrotic Left Hallux   Macerated wound edges w/ no clear margins   Mild drainage w/ mal odor present  Probes to bone     Vascular:   DP and PT Pulses diminished Left     Neuro:  Protective sensation mildly intact     MSK:   Manual Muscle strength 3/5 in all muscle compartments B/L    Assessment:   Necrotic / Gangrenous Left Hallux     Plan:  Chart reviewed and Patient evaluated. All questions and concerns were addressed answered.   Wound irrigated w/ Peroxide and flushed w/ Normal Saline; Betadine / DSD / Kerlix   Wound Culture Obtained; Sent to Pathology; Pending   XR Imaging Ordered; Pending   A-Duplex Ordered; Pending; If results are abnormal please Consult Vascular   Cont IV. Abx; as per ED   Request Medical / Cardio Clearance and OR Stratification  Surgery Scheduled for Tuesday 8/20 @ 7:30AM w/ Dr. Bhatia for Partial Hallux Amputation Left Foot    Attending Updated and Aware

## 2019-08-19 NOTE — H&P ADULT - NSHPPHYSICALEXAM_GEN_ALL_CORE
PHYSICAL EXAM:    T(C): 37.1 (08-19-19 @ 16:51), Max: 37.1 (08-19-19 @ 16:51)  HR: 67 (08-19-19 @ 20:04) (65 - 101)  BP: 157/78 (08-19-19 @ 20:04) (134/63 - 157/78)  RR: 17 (08-19-19 @ 20:04) (17 - 20)  SpO2: 99% (08-19-19 @ 20:04) (97% - 99%)    Constitutional:  HEENT: Neck supple, No oral lesions, no throat redness or swelling  Respiratory: Breath sounds  CTA b/l, no accessory muscle use  Cardiovascular: regular rate and rhythm, normal S1 S2, no murmurs  Gastrointestinal: soft non-tender no hepatosplenomegaly, normal BS  Genitourinary: no lesions, no discharge  Extremities: positive peripheral pulses no extremity edema  Neurological: AAO4 no focal motor deficit  Skin: left great toe erythema and foul odor  Musculoskeletal: no joint swelling or tenderness

## 2019-08-19 NOTE — ED ADULT NURSE NOTE - OBJECTIVE STATEMENT
Patient present to ED with complains of left big toe diabetic ulcer for about 1 year and worsening, patient was sent in by podiatry for admission and IV abx, denies fever, chills, nausea, vomiting, and diarrhea. Dressing and betadine applied to left big toe, noted mild foul smell.

## 2019-08-19 NOTE — ED PROVIDER NOTE - PROGRESS NOTE DETAILS
Spoke with podiatry. Aware of patient's need for admission. Will assess and agree with w/u. Spoke with vascular, Dr. Fitch. Will see patient. Arterial vascular study ordered. Signed out to MAR. Accepts admission. Patient understand plan of care.

## 2019-08-19 NOTE — ED PROVIDER NOTE - NS ED ROS FT
Constitutional:  No fevers or chills.  Eyes:  No visual changes, eye pain, or discharge.  ENT:  No sore throat.  Neck:  No neck pain.  Cardiac:  No CP or edema.  Resp:  No cough or SOB.   GI:  No nausea, vomiting, diarrhea, or abdominal pain.  :  No dysuria, frequency, or hematuria.  MSK:  No myalgias, left great toe pain with chronic DFU.  Neuro:  No headache, dizziness, or weakness.  Skin:  No skin rash.

## 2019-08-19 NOTE — H&P ADULT - ASSESSMENT
70yo M with PMH of insulin-dependent diabetes mellitus , DFU, and R great toe amputation presenting with infected left great toe infection.     # infected left hallux:  - source of infection needs control with IV atbx and surgery  - wound care per podiatry  - f/u foot xray left, prelim no gas seen  - vascular c/s appreciated , f/u [facility] dupplex  - IV vanc and unasyn  - NPO after midnight , IVF NS @ 75 cc/ hr  - Surgery Scheduled for Tuesday 8/20 @ 7:30AM w/ Dr. Bhatia   - left foot partial hallux amputation     # insulin-dependent diabetes mellitus:  - FS and insulin b/b    # HTN: c/w ACE    # DLD: c/w statin    # DVT ppx: heparin SQ tonight, resume post op  Diet DASH CC  AAT  dispo: from home

## 2019-08-19 NOTE — H&P ADULT - HISTORY OF PRESENT ILLNESS
68yo M with PMH of insulin-dependent diabetes mellitus , DFU, and R great toe amputation presenting with infected left great toe wound.   Patient was sent by his podiatrist Dr. Bhatia for admission for IV abx, pre-op eval for amputation of left great toe. Patient denies any known fevers, n/v, CP, SOB, abd pain, or injuries/traumas/falls. Patient has had his wound since 10/18 and has been receiving regular wound care. Has foul odor and left great toe pain.     In ED pt had tachycardia but no fever, WBC nl, Cr 1.3, resolved after he received vancomycin and zosyn and 1 L LR. 70yo M with PMH of insulin-dependent diabetes mellitus , DFU, and R great toe amputation presenting with infected left great toe infection.   Patient was sent by his podiatrist Dr. Bhatia for admission for IV abx, pre-op eval for amputation of left great toe. Patient denies any known fevers, n/v, CP, SOB, abd pain, or injuries/traumas/falls. Patient has had his wound since 10/18 and has been receiving regular wound care. Has foul odor and left great toe pain.     In ED pt had tachycardia but no fever, WBC nl, Cr 1.3, resolved after he received vancomycin and zosyn and 1 L LR.

## 2019-08-19 NOTE — CONSULT NOTE ADULT - ATTENDING COMMENTS
left toe ulcer and tibial arterial disease in the past. Would benefit from angiogram in left leg. will schedule. d/w patient. Had tibial angioplasty in Jan 2019.
Pt scheduled for partial hallux amputation with debridement of tissue
68 yo M with h/o Uncontrolled IDDM, PAD s/p R toe amputation and L DFU, METs 4    EKG: NSR with nonspecific T wave abnormalities    Moderate-risk for intermediate-risk surgery

## 2019-08-19 NOTE — H&P ADULT - NSICDXFAMILYHX_GEN_ALL_CORE_FT
FAMILY HISTORY:  Father  Still living? Unknown  Family history of ischemic heart disease (IHD), Age at diagnosis: Age Unknown    Mother  Still living? Unknown  Family history of lung cancer, Age at diagnosis: Age Unknown

## 2019-08-19 NOTE — CONSULT NOTE ADULT - ASSESSMENT
70 yo M with PMH of uncontrolled DM who presented after referral from his podiatrist is found to have Left great toe gangrene    PLAN:  -F/U podiatry  -F/U LE arterial duplex

## 2019-08-19 NOTE — HISTORY OF PRESENT ILLNESS
[FreeTextEntry1] : A 70 y/o male with PMH DM2 returns for management of left hallux ulcer. patient has not been applying santyl. relates mild pain to the left hallux.

## 2019-08-19 NOTE — ED PROVIDER NOTE - CLINICAL SUMMARY MEDICAL DECISION MAKING FREE TEXT BOX
a/p; Infected DFU w exposed bone, preop labs, XR, vascular and podiatry consult, iv abx, admit for amputation.

## 2019-08-19 NOTE — ED PROVIDER NOTE - OBJECTIVE STATEMENT
68yo M with PMH of DM (insulin dependent), DFU, and R great toe amputation presenting with infected left great toe wound. Patient was sent by his podiatrist Dr. Bhatia for admission for IV abx, pre-op eval, and potential amputation. Patient denies any known fevers, n/v, CP, SOB, abd pain, or injuries/traumas/falls. Patient has had his wound since 10/18 and has been receiving regular wound care. Has foul odor and left great toe pain. Podiatrist would like vascular eval as well. 68yo M with PMH of DM (insulin dependent), DFU, and R great toe amputation presenting with infected left great toe wound. Patient was sent by his podiatrist Dr. Bhatia for admission for IV abx, pre-op eval, and future amputation. Patient denies any known fevers, n/v, CP, SOB, abd pain, or injuries/traumas/falls. Patient has had his wound since 10/18 and has been receiving regular wound care. Has foul odor and left great toe pain. Podiatrist would like vascular eval as well. No cardiac hx.

## 2019-08-19 NOTE — H&P ADULT - NSHPREVIEWOFSYSTEMS_GEN_ALL_CORE
REVIEW OF SYSTEMS    CONSTITUTIONAL: No weakness, fevers or chills  EYES/ENT: No visual changes;  No vertigo or throat pain   NECK: No pain or stiffness. No cervical midline tenderness.  RESPIRATORY: No difficulty breathing, cough, wheezing.  CARDIOVASCULAR: No chest pain or palpitations  GASTROINTESTINAL: No abdominal or epigastric pain. No nausea, vomiting, or hematemesis. No diarrhea or constipation. No melena, BRBPR, or hematochezia.  GENITOURINARY: No dysuria, frequency or hematuria.  NEUROLOGICAL: No headache. No numbness or weakness.  MSK: left great toe redness and swelling

## 2019-08-20 ENCOUNTER — TRANSCRIPTION ENCOUNTER (OUTPATIENT)
Age: 69
End: 2019-08-20

## 2019-08-20 ENCOUNTER — RESULT REVIEW (OUTPATIENT)
Age: 69
End: 2019-08-20

## 2019-08-20 LAB
CRP SERPL-MCNC: 7.72 MG/DL — HIGH (ref 0–0.4)
GLUCOSE BLDC GLUCOMTR-MCNC: 187 MG/DL — HIGH (ref 70–99)
GLUCOSE BLDC GLUCOMTR-MCNC: 193 MG/DL — HIGH (ref 70–99)
GLUCOSE BLDC GLUCOMTR-MCNC: 200 MG/DL — HIGH (ref 70–99)
GLUCOSE BLDC GLUCOMTR-MCNC: 89 MG/DL — SIGNIFICANT CHANGE UP (ref 70–99)

## 2019-08-20 PROCEDURE — 88304 TISSUE EXAM BY PATHOLOGIST: CPT | Mod: 26

## 2019-08-20 PROCEDURE — 88311 DECALCIFY TISSUE: CPT | Mod: 26

## 2019-08-20 PROCEDURE — 28825 PARTIAL AMPUTATION OF TOE: CPT

## 2019-08-20 PROCEDURE — 73630 X-RAY EXAM OF FOOT: CPT | Mod: 26,LT

## 2019-08-20 PROCEDURE — 99223 1ST HOSP IP/OBS HIGH 75: CPT | Mod: AI

## 2019-08-20 PROCEDURE — 99222 1ST HOSP IP/OBS MODERATE 55: CPT

## 2019-08-20 PROCEDURE — 20240 BONE BIOPSY OPEN SUPERFICIAL: CPT

## 2019-08-20 PROCEDURE — 99221 1ST HOSP IP/OBS SF/LOW 40: CPT

## 2019-08-20 PROCEDURE — 93010 ELECTROCARDIOGRAM REPORT: CPT

## 2019-08-20 RX ORDER — INSULIN LISPRO 100/ML
15 VIAL (ML) SUBCUTANEOUS
Refills: 0 | Status: DISCONTINUED | OUTPATIENT
Start: 2019-08-20 | End: 2019-08-21

## 2019-08-20 RX ORDER — SODIUM CHLORIDE 9 MG/ML
1000 INJECTION INTRAMUSCULAR; INTRAVENOUS; SUBCUTANEOUS
Refills: 0 | Status: DISCONTINUED | OUTPATIENT
Start: 2019-08-20 | End: 2019-08-21

## 2019-08-20 RX ORDER — ATORVASTATIN CALCIUM 80 MG/1
40 TABLET, FILM COATED ORAL AT BEDTIME
Refills: 0 | Status: DISCONTINUED | OUTPATIENT
Start: 2019-08-20 | End: 2019-08-21

## 2019-08-20 RX ORDER — SERTRALINE 25 MG/1
50 TABLET, FILM COATED ORAL DAILY
Refills: 0 | Status: DISCONTINUED | OUTPATIENT
Start: 2019-08-20 | End: 2019-08-21

## 2019-08-20 RX ORDER — INSULIN LISPRO 100/ML
VIAL (ML) SUBCUTANEOUS
Refills: 0 | Status: DISCONTINUED | OUTPATIENT
Start: 2019-08-20 | End: 2019-08-21

## 2019-08-20 RX ORDER — SODIUM CHLORIDE 9 MG/ML
1000 INJECTION, SOLUTION INTRAVENOUS
Refills: 0 | Status: DISCONTINUED | OUTPATIENT
Start: 2019-08-20 | End: 2019-08-20

## 2019-08-20 RX ORDER — DEXTROSE 50 % IN WATER 50 %
25 SYRINGE (ML) INTRAVENOUS ONCE
Refills: 0 | Status: DISCONTINUED | OUTPATIENT
Start: 2019-08-20 | End: 2019-08-21

## 2019-08-20 RX ORDER — GLUCAGON INJECTION, SOLUTION 0.5 MG/.1ML
1 INJECTION, SOLUTION SUBCUTANEOUS ONCE
Refills: 0 | Status: DISCONTINUED | OUTPATIENT
Start: 2019-08-20 | End: 2019-08-21

## 2019-08-20 RX ORDER — AMPICILLIN SODIUM AND SULBACTAM SODIUM 250; 125 MG/ML; MG/ML
3 INJECTION, POWDER, FOR SUSPENSION INTRAMUSCULAR; INTRAVENOUS EVERY 6 HOURS
Refills: 0 | Status: DISCONTINUED | OUTPATIENT
Start: 2019-08-20 | End: 2019-08-21

## 2019-08-20 RX ORDER — SODIUM CHLORIDE 9 MG/ML
1000 INJECTION, SOLUTION INTRAVENOUS
Refills: 0 | Status: DISCONTINUED | OUTPATIENT
Start: 2019-08-20 | End: 2019-08-21

## 2019-08-20 RX ORDER — ASPIRIN/CALCIUM CARB/MAGNESIUM 324 MG
81 TABLET ORAL DAILY
Refills: 0 | Status: DISCONTINUED | OUTPATIENT
Start: 2019-08-20 | End: 2019-08-21

## 2019-08-20 RX ORDER — DEXTROSE 50 % IN WATER 50 %
15 SYRINGE (ML) INTRAVENOUS ONCE
Refills: 0 | Status: DISCONTINUED | OUTPATIENT
Start: 2019-08-20 | End: 2019-08-21

## 2019-08-20 RX ORDER — PANTOPRAZOLE SODIUM 20 MG/1
40 TABLET, DELAYED RELEASE ORAL
Refills: 0 | Status: DISCONTINUED | OUTPATIENT
Start: 2019-08-20 | End: 2019-08-21

## 2019-08-20 RX ORDER — MORPHINE SULFATE 50 MG/1
4 CAPSULE, EXTENDED RELEASE ORAL
Refills: 0 | Status: DISCONTINUED | OUTPATIENT
Start: 2019-08-20 | End: 2019-08-20

## 2019-08-20 RX ORDER — INSULIN GLARGINE 100 [IU]/ML
45 INJECTION, SOLUTION SUBCUTANEOUS AT BEDTIME
Refills: 0 | Status: DISCONTINUED | OUTPATIENT
Start: 2019-08-20 | End: 2019-08-21

## 2019-08-20 RX ORDER — DEXTROSE 50 % IN WATER 50 %
12.5 SYRINGE (ML) INTRAVENOUS ONCE
Refills: 0 | Status: DISCONTINUED | OUTPATIENT
Start: 2019-08-20 | End: 2019-08-21

## 2019-08-20 RX ORDER — HEPARIN SODIUM 5000 [USP'U]/ML
5000 INJECTION INTRAVENOUS; SUBCUTANEOUS EVERY 8 HOURS
Refills: 0 | Status: DISCONTINUED | OUTPATIENT
Start: 2019-08-20 | End: 2019-08-21

## 2019-08-20 RX ORDER — LISINOPRIL 2.5 MG/1
40 TABLET ORAL DAILY
Refills: 0 | Status: DISCONTINUED | OUTPATIENT
Start: 2019-08-20 | End: 2019-08-21

## 2019-08-20 RX ORDER — AMPICILLIN SODIUM AND SULBACTAM SODIUM 250; 125 MG/ML; MG/ML
3 INJECTION, POWDER, FOR SUSPENSION INTRAMUSCULAR; INTRAVENOUS EVERY 6 HOURS
Refills: 0 | Status: DISCONTINUED | OUTPATIENT
Start: 2019-08-20 | End: 2019-08-20

## 2019-08-20 RX ADMIN — Medication 15 UNIT(S): at 13:02

## 2019-08-20 RX ADMIN — HEPARIN SODIUM 5000 UNIT(S): 5000 INJECTION INTRAVENOUS; SUBCUTANEOUS at 21:32

## 2019-08-20 RX ADMIN — LISINOPRIL 40 MILLIGRAM(S): 2.5 TABLET ORAL at 05:52

## 2019-08-20 RX ADMIN — AMPICILLIN SODIUM AND SULBACTAM SODIUM 200 GRAM(S): 250; 125 INJECTION, POWDER, FOR SUSPENSION INTRAMUSCULAR; INTRAVENOUS at 13:00

## 2019-08-20 RX ADMIN — Medication 15 UNIT(S): at 17:35

## 2019-08-20 RX ADMIN — SODIUM CHLORIDE 75 MILLILITER(S): 9 INJECTION INTRAMUSCULAR; INTRAVENOUS; SUBCUTANEOUS at 11:50

## 2019-08-20 RX ADMIN — AMPICILLIN SODIUM AND SULBACTAM SODIUM 200 GRAM(S): 250; 125 INJECTION, POWDER, FOR SUSPENSION INTRAMUSCULAR; INTRAVENOUS at 18:00

## 2019-08-20 RX ADMIN — Medication 2: at 17:35

## 2019-08-20 RX ADMIN — ATORVASTATIN CALCIUM 40 MILLIGRAM(S): 80 TABLET, FILM COATED ORAL at 21:33

## 2019-08-20 RX ADMIN — INSULIN GLARGINE 45 UNIT(S): 100 INJECTION, SOLUTION SUBCUTANEOUS at 21:34

## 2019-08-20 RX ADMIN — AMPICILLIN SODIUM AND SULBACTAM SODIUM 200 GRAM(S): 250; 125 INJECTION, POWDER, FOR SUSPENSION INTRAMUSCULAR; INTRAVENOUS at 05:52

## 2019-08-20 RX ADMIN — Medication 2: at 13:01

## 2019-08-20 RX ADMIN — SODIUM CHLORIDE 75 MILLILITER(S): 9 INJECTION INTRAMUSCULAR; INTRAVENOUS; SUBCUTANEOUS at 23:52

## 2019-08-20 RX ADMIN — AMPICILLIN SODIUM AND SULBACTAM SODIUM 200 GRAM(S): 250; 125 INJECTION, POWDER, FOR SUSPENSION INTRAMUSCULAR; INTRAVENOUS at 23:40

## 2019-08-20 RX ADMIN — Medication 81 MILLIGRAM(S): at 13:03

## 2019-08-20 RX ADMIN — SERTRALINE 50 MILLIGRAM(S): 25 TABLET, FILM COATED ORAL at 13:03

## 2019-08-20 RX ADMIN — PANTOPRAZOLE SODIUM 40 MILLIGRAM(S): 20 TABLET, DELAYED RELEASE ORAL at 05:52

## 2019-08-20 NOTE — CHART NOTE - NSCHARTNOTEFT_GEN_A_CORE
69y Male    left foot surgery    HPI:  70yo M with PMH of insulin-dependent diabetes mellitus , DFU, and R great toe amputation presenting with infected left great toe infection.   Patient was sent by his podiatrist Dr. Bhatia for admission for IV abx, pre-op eval for amputation of left great toe. Patient denies any known fevers, n/v, CP, SOB, abd pain, or injuries/traumas/falls. Patient has had his wound since 10/18 and has been receiving regular wound care. Has foul odor and left great toe pain.     In ED pt had tachycardia but no fever, WBC nl, Cr 1.3, resolved after he received vancomycin and zosyn and 1 L LR. (19 Aug 2019 20:14)      PAST MEDICAL & SURGICAL HISTORY:  GERD (gastroesophageal reflux disease)  Depression  Diabetic foot ulcer  Dyslipidemia  HTN (hypertension)  DM (diabetes mellitus): 12/27/18 hgb aic  11.2  Toe amputation status, right: (2008)        MEDICATIONS  (STANDING):  ampicillin/sulbactam  IVPB 3 Gram(s) IV Intermittent every 6 hours  aspirin enteric coated 81 milliGRAM(s) Oral daily  atorvastatin 40 milliGRAM(s) Oral at bedtime  dextrose 5%. 1000 milliLiter(s) (50 mL/Hr) IV Continuous <Continuous>  dextrose 50% Injectable 12.5 Gram(s) IV Push once  dextrose 50% Injectable 25 Gram(s) IV Push once  dextrose 50% Injectable 25 Gram(s) IV Push once  insulin glargine Injectable (LANTUS) 45 Unit(s) SubCutaneous at bedtime  insulin lispro (HumaLOG) corrective regimen sliding scale   SubCutaneous three times a day before meals  insulin lispro Injectable (HumaLOG) 15 Unit(s) SubCutaneous three times a day before meals  lisinopril 40 milliGRAM(s) Oral daily  pantoprazole    Tablet 40 milliGRAM(s) Oral before breakfast  sertraline 50 milliGRAM(s) Oral daily  sodium chloride 0.9%. 1000 milliLiter(s) (75 mL/Hr) IV Continuous <Continuous>  vancomycin  IVPB 1000 milliGRAM(s) IV Intermittent every 12 hours    MEDICATIONS  (PRN):  dextrose 40% Gel 15 Gram(s) Oral once PRN Blood Glucose LESS THAN 70 milliGRAM(s)/deciliter  glucagon  Injectable 1 milliGRAM(s) IntraMuscular once PRN Glucose LESS THAN 70 milligrams/deciliter    Home Medications:  benazepril 40 mg oral tablet: 1 tab(s) orally once a day (18 Jan 2019 09:51)  Crestor 10 mg oral tablet: 1 tab(s) orally once a day (at bedtime) (18 Jan 2019 09:51)  HumuLIN 70/30 KwikPen 70 units-30 units/mL subcutaneous suspension: 50 unit(s) subcutaneous once a day before breakfast (18 Jan 2019 09:51)  HumuLIN 70/30 subcutaneous suspension: 40 unit(s) subcutaneous once a day (at bedtime) (18 Jan 2019 09:51)  Janumet 50 mg-1000 mg oral tablet: 1 tab(s) orally 2 times a day (18 Jan 2019 09:51)  omeprazole 20 mg oral delayed release capsule: 1 cap(s) orally once a day (18 Jan 2019 09:51)  sertraline 50 mg oral tablet: 1 tab(s) orally once a day (18 Jan 2019 09:51)      Allergies    No Known Allergies    Intolerances        SOCIAL HISTORY: NSNDND    FAMILY HISTORY:  Family history of ischemic heart disease (IHD) (Father)  Family history of lung cancer (Mother)      Vital Signs Last 24 Hrs  T(C): 36.7 (20 Aug 2019 05:00), Max: 37.1 (19 Aug 2019 16:51)  T(F): 98 (20 Aug 2019 05:00), Max: 98.8 (19 Aug 2019 16:51)  HR: 73 (20 Aug 2019 05:00) (65 - 101)  BP: 138/63 (20 Aug 2019 05:00) (134/63 - 167/72)  BP(mean): --  RR: 18 (20 Aug 2019 05:00) (17 - 20)  SpO2: 98% (19 Aug 2019 22:00) (97% - 99%)    NPO: _x___ Yes  ____ No    Exam:  Drug Dosing Weight  Height (cm): 180.34 (20 Aug 2019 07:17)  Weight (kg): 93.4 (20 Aug 2019 07:17)  BMI (kg/m2): 28.7 (20 Aug 2019 07:17)  BSA (m2): 2.14 (20 Aug 2019 07:17)    MP: III  Teeth: missing  Mouth opening:    LABS:                        10.5   9.36  )-----------( 260      ( 19 Aug 2019 14:58 )             31.5         08-19    140  |  99  |  22<H>  ----------------------------<  312<H>  4.2   |  24  |  1.3    Ca    9.1      19 Aug 2019 14:58    TPro  6.6  /  Alb  3.8  /  TBili  0.6  /  DBili  x   /  AST  11  /  ALT  14  /  AlkPhos  103  08-19    PT/INR - ( 19 Aug 2019 16:55 )   PT: 12.60 sec;   INR: 1.10 ratio         PTT - ( 19 Aug 2019 16:55 )  PTT:32.9 sec      RADIOLOGY & ADDITIONAL STUDIES:      ASA _III___,  R/B/A discussed with: __x__ patient, ____ guardian, Understand and Accepts, Medicine note appreciated, EKG pending  Question Answered.

## 2019-08-20 NOTE — CONSULT NOTE ADULT - ASSESSMENT
ASSESSMENT  68yo M with PMH of insulin-dependent diabetes mellitus , DFU, and R great toe amputation presenting with infected left great toe infection.   s/p amputation resected bone, proximal clearing margin path and culture (head of prox phalanx)    IMPRESSION  #DFU s/p amputation    Sepsis ruled out on admission     RECOMMENDATIONS  - F/u OR cultures and path  - cont unasyn, as good margins, d/c on po augmentin x 3 days (tailor abx to cultures)    Spectra [id number]

## 2019-08-20 NOTE — DISCHARGE NOTE NURSING/CASE MANAGEMENT/SOCIAL WORK - NSDCDPATPORTLINK_GEN_ALL_CORE
You can access the [facility] Portal, offered by [facility], by registering with the following website: [email or web address]

## 2019-08-20 NOTE — BRIEF OPERATIVE NOTE - NSICDXBRIEFPREOP_GEN_ALL_CORE_FT
PRE-OP DIAGNOSIS:  Cellulitis, toe 20-Aug-2019 09:02:10  Royal Bhatia  Osteomyelitis 20-Aug-2019 09:01:49  Royal Bhatia

## 2019-08-20 NOTE — CONSULT NOTE ADULT - SUBJECTIVE AND OBJECTIVE BOX
JOSÉ MANUEL PORTER  69y, Male  Allergy: No Known Allergies      CHIEF COMPLAINT: left great toe infection (20 Aug 2019 05:49)      HPI:  70yo M with PMH of insulin-dependent diabetes mellitus , DFU, and R great toe amputation presenting with infected left great toe infection.   Patient was sent by his podiatrist Dr. Bhatia for admission for IV abx, pre-op eval for amputation of left great toe. Patient denies any known fevers, n/v, CP, SOB, abd pain, or injuries/traumas/falls. Patient has had his wound since 10/18 and has been receiving regular wound care. Has foul odor and left great toe pain.     In ED pt had tachycardia but no fever, WBC nl, Cr 1.3, resolved after he received vancomycin and zosyn and 1 L LR. (19 Aug 2019 20:14)    s/p amputation resected bone, proximal clearing margin path and culture (head of prox phalanx)    PAST MEDICAL & SURGICAL HISTORY:  GERD (gastroesophageal reflux disease)  Depression  Diabetic foot ulcer  Dyslipidemia  HTN (hypertension)  DM (diabetes mellitus): 12/27/18 hgb aic  11.2  Toe amputation status, right: (2008)      FAMILY HISTORY  Family history of ischemic heart disease (IHD) (Father)  Family history of lung cancer (Mother)      SOCIAL HISTORY    Pt denies any current heavy ETOH use, IVDU. No recent travel outside the [place].     ROS  General: Denies rigors, nightsweats  HEENT: Denies headache, rhinorrhea, sore throat, eye pain  CV: Denies CP, palpitations  PULM: Denies SOB, wheezing  GI: Denies abdominal pain, hematochezia/melena  : Denies dysuria, hematuria  MSK: Denies arthralgias, myalgias  SKIN: Denies rash, lesions  NEURO: Denies paresthesias, weakness  PSYCH: Denies depression, anxiety    VITALS:  T(F): 98.8, Max: 98.8 (08-19-19 @ 16:51)  HR: 69  BP: 141/75  RR: 18Vital Signs Last 24 Hrs  T(C): 37.1 (20 Aug 2019 10:15), Max: 37.1 (19 Aug 2019 16:51)  T(F): 98.8 (20 Aug 2019 10:15), Max: 98.8 (19 Aug 2019 16:51)  HR: 69 (20 Aug 2019 10:15) (63 - 101)  BP: 141/75 (20 Aug 2019 10:15) (134/63 - 167/72)  BP(mean): --  RR: 18 (20 Aug 2019 10:15) (14 - 20)  SpO2: 99% (20 Aug 2019 10:15) (97% - 100%)    PHYSICAL EXAM:  Gen: NAD, resting in bed  HEENT: Normocephalic, atraumatic  Neck: supple, no lymphadenopathy  CV: Regular rate & regular rhythm  Lungs: decreased BS at bases, no fremitus  Abdomen: Soft, BS present  Ext: Warm, well perfused  Neuro: non focal, awake  Skin: no rash, no erythema  Lines: no phlebitis    TESTS & MEASUREMENTS:                        10.5   9.36  )-----------( 260      ( 19 Aug 2019 14:58 )             31.5     08-19    140  |  99  |  22<H>  ----------------------------<  312<H>  4.2   |  24  |  1.3    Ca    9.1      19 Aug 2019 14:58    TPro  6.6  /  Alb  3.8  /  TBili  0.6  /  DBili  x   /  AST  11  /  ALT  14  /  AlkPhos  103  08-19    eGFR if Non African American: 55 mL/min/1.73M2 (08-19-19 @ 14:58)  eGFR if : 64 mL/min/1.73M2 (08-19-19 @ 14:58)    LIVER FUNCTIONS - ( 19 Aug 2019 14:58 )  Alb: 3.8 g/dL / Pro: 6.6 g/dL / ALK PHOS: 103 U/L / ALT: 14 U/L / AST: 11 U/L / GGT: x                     INFECTIOUS DISEASES TESTING      RADIOLOGY & ADDITIONAL TESTS:  I have personally reviewed the last Chest xray  CXR      CT      CARDIOLOGY TESTING  12 Lead ECG:   Ventricular Rate 72 BPM    Atrial Rate 72 BPM    P-R Interval 140 ms    QRS Duration 68 ms    Q-T Interval 386 ms    QTC Calculation(Bezet) 422 ms    P Axis 52 degrees    R Axis 4 degrees    T Axis 6 degrees    Diagnosis Line Normal sinus rhythm  Nonspecific T wave abnormality  Abnormal ECG    Confirmed by DAVE COWAN MD ([id number]) on 8/20/2019 9:01:52 AM (08-20-19 @ 07:45)      MEDICATIONS  ampicillin/sulbactam  IVPB 3  aspirin enteric coated 81  atorvastatin 40  dextrose 5%. 1000  dextrose 50% Injectable 12.5  dextrose 50% Injectable 25  dextrose 50% Injectable 25  insulin glargine Injectable (LANTUS) 45  insulin lispro (HumaLOG) corrective regimen sliding scale   insulin lispro Injectable (HumaLOG) 15  lisinopril 40  pantoprazole    Tablet 40  sertraline 50  sodium chloride 0.9%. 1000      ANTIBIOTICS:  ampicillin/sulbactam  IVPB 3 Gram(s) IV Intermittent every 6 hours      ALLERGIES:  No Known Allergies

## 2019-08-20 NOTE — PROGRESS NOTE ADULT - SUBJECTIVE AND OBJECTIVE BOX
Podiatry Pre-Operative Note     JOSÉ MANUEL PORTER is a 69y year old Male patient presenting to the operating room for surgical management of the Left Foot. The patient has failed all conservative measures and requires surgical intervention at this time.    PAST MEDICAL & SURGICAL HISTORY:  GERD (gastroesophageal reflux disease)  Depression  Diabetic foot ulcer  Dyslipidemia  HTN (hypertension)  DM (diabetes mellitus): 12/27/18 hgb aic  11.2  Toe amputation status, right: (2008)      Medications:   ampicillin/sulbactam  IVPB 3 Gram(s) IV Intermittent every 6 hours  aspirin enteric coated 81 milliGRAM(s) Oral daily  atorvastatin 40 milliGRAM(s) Oral at bedtime  dextrose 40% Gel 15 Gram(s) Oral once PRN  dextrose 5%. 1000 milliLiter(s) IV Continuous <Continuous>  dextrose 50% Injectable 12.5 Gram(s) IV Push once  dextrose 50% Injectable 25 Gram(s) IV Push once  dextrose 50% Injectable 25 Gram(s) IV Push once  glucagon  Injectable 1 milliGRAM(s) IntraMuscular once PRN  insulin glargine Injectable (LANTUS) 45 Unit(s) SubCutaneous at bedtime  insulin lispro (HumaLOG) corrective regimen sliding scale   SubCutaneous three times a day before meals  insulin lispro Injectable (HumaLOG) 15 Unit(s) SubCutaneous three times a day before meals  lisinopril 40 milliGRAM(s) Oral daily  pantoprazole    Tablet 40 milliGRAM(s) Oral before breakfast  sertraline 50 milliGRAM(s) Oral daily  sodium chloride 0.9%. 1000 milliLiter(s) IV Continuous <Continuous>  vancomycin  IVPB 1000 milliGRAM(s) IV Intermittent every 12 hours    Aspirin Enteric Coated 81 mg oral delayed release tablet: 1 tab(s) orally once a day   benazepril 40 mg oral tablet: 1 tab(s) orally once a day  Crestor 10 mg oral tablet: 1 tab(s) orally once a day (at bedtime)  HumuLIN 70/30 KwikPen 70 units-30 units/mL subcutaneous suspension: 50 unit(s) subcutaneous once a day before breakfast  HumuLIN 70/30 subcutaneous suspension: 40 unit(s) subcutaneous once a day (at bedtime)  Janumet 50 mg-1000 mg oral tablet: 1 tab(s) orally 2 times a day  omeprazole 20 mg oral delayed release capsule: 1 cap(s) orally once a day  povidone iodine 10% topical solution: Apply topically to affected area once a day   sertraline 50 mg oral tablet: 1 tab(s) orally once a day    Allergies    No Known Allergies    Intolerances                              10.5   9.36  )-----------( 260      ( 19 Aug 2019 14:58 )             31.5     08-19    140  |  99  |  22<H>  ----------------------------<  312<H>  4.2   |  24  |  1.3    Ca    9.1      19 Aug 2019 14:58    TPro  6.6  /  Alb  3.8  /  TBili  0.6  /  DBili  x   /  AST  11  /  ALT  14  /  AlkPhos  103  08-19    PT/INR - ( 19 Aug 2019 16:55 )   PT: 12.60 sec;   INR: 1.10 ratio         PTT - ( 19 Aug 2019 16:55 )  PTT:32.9 sec    CAPILLARY BLOOD GLUCOSE      POCT Blood Glucose.: 188 mg/dL (19 Aug 2019 23:00)  POCT Blood Glucose.: 159 mg/dL (19 Aug 2019 21:07)        T(C): 36.6 (08-19-19 @ 22:00), Max: 37.1 (08-19-19 @ 16:51)  HR: 79 (08-19-19 @ 22:00) (65 - 101)  BP: 167/72 (08-19-19 @ 22:00) (134/63 - 167/72)  RR: 18 (08-19-19 @ 22:00) (17 - 20)  SpO2: 98% (08-19-19 @ 22:00) (97% - 99%)    Surgeon: Dr. Royal Bhatia  Assistant (s): [facility]idents  Pre Operative Diagnosis: Osteomyelitic Hallux Left Foot  Planned Procedure: Excisional Debridement of Soft Tissue and Bone w/ Possible Hallux Amputation Left Foot     The patient has been educated on the above procedure, with all risks and benefits described. No Guarantees were given or implied for the aforementioned procedure. The above assistant(s) have introduced themselves to the patient. The patient consents to their participation in the procedure listed above.   08-20-19 @ 05:50

## 2019-08-20 NOTE — BRIEF OPERATIVE NOTE - NSICDXBRIEFPROCEDURE_GEN_ALL_CORE_FT
PROCEDURES:  Open biopsy, bone, toe 20-Aug-2019 09:01:36  Royal Bhatia  Amputation, toe, partial 20-Aug-2019 09:01:17  Royal Bhatia

## 2019-08-20 NOTE — PROGRESS NOTE ADULT - SUBJECTIVE AND OBJECTIVE BOX
JOSÉ MANUEL PORTER  [facility] T3-3B 011 [address] ([facility] T3-3B)      Patient is a 69y old  Male who presents with a chief complaint of left great toe infection (20 Aug 2019 12:24)      HPI:  70yo M with PMH of insulin-dependent diabetes mellitus , DFU, and R great toe amputation presenting with infected left great toe infection.   Patient was sent by his podiatrist Dr. Bhatia for admission for IV abx, pre-op eval for amputation of left great toe. Patient denies any known fevers, n/v, CP, SOB, abd pain, or injuries/traumas/falls. Patient has had his wound since 10/18 and has been receiving regular wound care. Has foul odor and left great toe pain.     In ED pt had tachycardia but no fever, WBC nl, Cr 1.3, resolved after he received vancomycin and zosyn and 1 L LR. (19 Aug 2019 20:14)    Interval Events:  Patient seen and examined at bedside. This morning, he had partial amputation of left great toe. He currently has no complaints. Denies fever/chills/nausea/vomiting/cp/sob.     -PMHx: GERD (gastroesophageal reflux disease) [Active]  Depression [Active]  Diabetic foot ulcer [Active]  Dyslipidemia [Active]  HTN (hypertension) [Active]  DM (diabetes mellitus) [Active]    -PSHx:Toe amputation status, right    -FAMILY HISTORY:  Family history of ischemic heart disease (IHD) (Father)  Family history of lung cancer (Mother)      REVIEW OF SYSTEMS:  CONSTITUTIONAL: No fever, weight loss, or fatigue  RESPIRATORY: No cough, wheezing, chills or hemoptysis; No shortness of breath  CARDIOVASCULAR: No chest pain, palpitations, dizziness, or leg swelling  GASTROINTESTINAL: No abdominal or epigastric pain. No nausea, vomiting, or hematemesis; No diarrhea or constipation. No melena or hematochezia.  NEUROLOGICAL: No headaches  LYMPH NODES: No enlarged glands  MUSCULOSKELETAL: No joint pain or swelling; No muscle, back, or extremity pain      T(C): , Max: 37.1 (08-19-19 @ 16:51)  HR: 68 (08-20-19 @ 13:29)  BP: 124/58 (08-20-19 @ 13:29)  RR: 20 (08-20-19 @ 13:29)  SpO2: 99% (08-20-19 @ 10:15)  CAPILLARY BLOOD GLUCOSE      POCT Blood Glucose.: 200 mg/dL (20 Aug 2019 12:26)  POCT Blood Glucose.: 193 mg/dL (20 Aug 2019 06:27)  POCT Blood Glucose.: 188 mg/dL (19 Aug 2019 23:00)  POCT Blood Glucose.: 159 mg/dL (19 Aug 2019 21:07)      PHYSICAL EXAM:  Gen: NAD, resting in bed  HEENT: Normocephalic, atraumatic  Neck: supple, no lymphadenopathy  CV: Regular rate & regular rhythm  Lungs: decreased BS at bases, no fremitus  Abdomen: Soft, BS present  Ext: Warm, well perfused. LLE wrapped, no saturation through gauze  Neuro: non focal, awake  Skin: no rash, no erythema  Lines: no phlebitis    LABS:          10.5  9.36  )-------(260          31.5  N=86.3  L=6.2  MCV=86.1    140|99|22<H>  ------------------<312<H>  4.2|24|1.3  eGFR:55<L>  Ca:9.1      PT/INR - ( 19 Aug 2019 16:55 )   PT: 12.60 sec;   INR: 1.10 ratio         PTT - ( 19 Aug 2019 16:55 )  PTT:32.9 sec      Microbiology:      RADIOLOGY & ADDITIONAL TESTS:  < from: VA Duplex Lower Extrem Arterial, Bilat (08.19.19 @ 17:40) >  Impression:    Posterior tibial artery with high-grade stenosis.    < end of copied text >    < from: Xray Foot AP + Lateral + Oblique, Left (08.19.19 @ 16:35) >    Impression:  Evidence of acute osteomyelitis involving the first distal phalanx.    < end of copied text >        Medications:  ampicillin/sulbactam  IVPB 3 Gram(s) IV Intermittent every 6 hours  aspirin enteric coated 81 milliGRAM(s) Oral daily  atorvastatin 40 milliGRAM(s) Oral at bedtime  dextrose 40% Gel 15 Gram(s) Oral once PRN  dextrose 5%. 1000 milliLiter(s) IV Continuous <Continuous>  dextrose 50% Injectable 12.5 Gram(s) IV Push once  dextrose 50% Injectable 25 Gram(s) IV Push once  dextrose 50% Injectable 25 Gram(s) IV Push once  glucagon  Injectable 1 milliGRAM(s) IntraMuscular once PRN  insulin glargine Injectable (LANTUS) 45 Unit(s) SubCutaneous at bedtime  insulin lispro (HumaLOG) corrective regimen sliding scale   SubCutaneous three times a day before meals  insulin lispro Injectable (HumaLOG) 15 Unit(s) SubCutaneous three times a day before meals  lisinopril 40 milliGRAM(s) Oral daily  pantoprazole    Tablet 40 milliGRAM(s) Oral before breakfast  sertraline 50 milliGRAM(s) Oral daily  sodium chloride 0.9%. 1000 milliLiter(s) IV Continuous <Continuous>

## 2019-08-20 NOTE — PROGRESS NOTE ADULT - ASSESSMENT
68yo M with PMH of insulin-dependent diabetes mellitus , DFU, and R great toe amputation presenting with infected left great toe infection.     #DFU  -s/p amputation resected bone, proximal clearing margin path and culture (head of prox phalanx)  -Per ID, continue on Unasyn  -Good margins  -On d/c, po augmentin x 3 days (tailor abx to cultures) 70yo M with PMH of insulin-dependent diabetes mellitus , DFU, and R great toe amputation presenting with infected left great toe infection.     #DFU  -s/p amputation resected bone, proximal clearing margin path and culture (head of prox phalanx)  -Per ID, continue on Unasyn  -Good margins  -On d/c, po augmentin x 3 days (tailor abx to cultures)    #DM  - Lantus 45U qhs  - Lispro 15U tid  - sliding scale    #Diet  - Consistent carbohydrate    #DVT ppx  - Heparin 5000U sc q8h    #Dispo  - Home tomorrow

## 2019-08-21 ENCOUNTER — TRANSCRIPTION ENCOUNTER (OUTPATIENT)
Age: 69
End: 2019-08-21

## 2019-08-21 ENCOUNTER — INBOUND DOCUMENT (OUTPATIENT)
Age: 69
End: 2019-08-21

## 2019-08-21 VITALS
SYSTOLIC BLOOD PRESSURE: 134 MMHG | RESPIRATION RATE: 18 BRPM | HEART RATE: 83 BPM | DIASTOLIC BLOOD PRESSURE: 66 MMHG | TEMPERATURE: 98 F

## 2019-08-21 LAB
-  AMIKACIN: SIGNIFICANT CHANGE UP
-  AMIKACIN: SIGNIFICANT CHANGE UP
-  AMOXICILLIN/CLAVULANIC ACID: SIGNIFICANT CHANGE UP
-  AMOXICILLIN/CLAVULANIC ACID: SIGNIFICANT CHANGE UP
-  AMPICILLIN/SULBACTAM: SIGNIFICANT CHANGE UP
-  AMPICILLIN/SULBACTAM: SIGNIFICANT CHANGE UP
-  AMPICILLIN: SIGNIFICANT CHANGE UP
-  AMPICILLIN: SIGNIFICANT CHANGE UP
-  AZTREONAM: SIGNIFICANT CHANGE UP
-  AZTREONAM: SIGNIFICANT CHANGE UP
-  CEFAZOLIN: SIGNIFICANT CHANGE UP
-  CEFAZOLIN: SIGNIFICANT CHANGE UP
-  CEFEPIME: SIGNIFICANT CHANGE UP
-  CEFEPIME: SIGNIFICANT CHANGE UP
-  CEFOXITIN: SIGNIFICANT CHANGE UP
-  CEFOXITIN: SIGNIFICANT CHANGE UP
-  CEFTRIAXONE: SIGNIFICANT CHANGE UP
-  CEFTRIAXONE: SIGNIFICANT CHANGE UP
-  CIPROFLOXACIN: SIGNIFICANT CHANGE UP
-  CIPROFLOXACIN: SIGNIFICANT CHANGE UP
-  ERTAPENEM: SIGNIFICANT CHANGE UP
-  ERTAPENEM: SIGNIFICANT CHANGE UP
-  GENTAMICIN: SIGNIFICANT CHANGE UP
-  GENTAMICIN: SIGNIFICANT CHANGE UP
-  IMIPENEM: SIGNIFICANT CHANGE UP
-  IMIPENEM: SIGNIFICANT CHANGE UP
-  LEVOFLOXACIN: SIGNIFICANT CHANGE UP
-  LEVOFLOXACIN: SIGNIFICANT CHANGE UP
-  MEROPENEM: SIGNIFICANT CHANGE UP
-  MEROPENEM: SIGNIFICANT CHANGE UP
-  PIPERACILLIN/TAZOBACTAM: SIGNIFICANT CHANGE UP
-  PIPERACILLIN/TAZOBACTAM: SIGNIFICANT CHANGE UP
-  TOBRAMYCIN: SIGNIFICANT CHANGE UP
-  TOBRAMYCIN: SIGNIFICANT CHANGE UP
-  TRIMETHOPRIM/SULFAMETHOXAZOLE: SIGNIFICANT CHANGE UP
-  TRIMETHOPRIM/SULFAMETHOXAZOLE: SIGNIFICANT CHANGE UP
ANION GAP SERPL CALC-SCNC: 8 MMOL/L — SIGNIFICANT CHANGE UP (ref 7–14)
BUN SERPL-MCNC: 8 MG/DL — LOW (ref 10–20)
CALCIUM SERPL-MCNC: 8.7 MG/DL — SIGNIFICANT CHANGE UP (ref 8.5–10.1)
CHLORIDE SERPL-SCNC: 106 MMOL/L — SIGNIFICANT CHANGE UP (ref 98–110)
CO2 SERPL-SCNC: 26 MMOL/L — SIGNIFICANT CHANGE UP (ref 17–32)
CREAT SERPL-MCNC: 0.9 MG/DL — SIGNIFICANT CHANGE UP (ref 0.7–1.5)
GLUCOSE BLDC GLUCOMTR-MCNC: 120 MG/DL — HIGH (ref 70–99)
GLUCOSE BLDC GLUCOMTR-MCNC: 79 MG/DL — SIGNIFICANT CHANGE UP (ref 70–99)
GLUCOSE SERPL-MCNC: 146 MG/DL — HIGH (ref 70–99)
HCT VFR BLD CALC: 29.8 % — LOW (ref 42–52)
HGB BLD-MCNC: 9.7 G/DL — LOW (ref 14–18)
MAGNESIUM SERPL-MCNC: 1.4 MG/DL — LOW (ref 1.8–2.4)
MCHC RBC-ENTMCNC: 28.1 PG — SIGNIFICANT CHANGE UP (ref 27–31)
MCHC RBC-ENTMCNC: 32.6 G/DL — SIGNIFICANT CHANGE UP (ref 32–37)
MCV RBC AUTO: 86.4 FL — SIGNIFICANT CHANGE UP (ref 80–94)
METHOD TYPE: SIGNIFICANT CHANGE UP
METHOD TYPE: SIGNIFICANT CHANGE UP
NRBC # BLD: 0 /100 WBCS — SIGNIFICANT CHANGE UP (ref 0–0)
PLATELET # BLD AUTO: 267 K/UL — SIGNIFICANT CHANGE UP (ref 130–400)
POTASSIUM SERPL-MCNC: 4.3 MMOL/L — SIGNIFICANT CHANGE UP (ref 3.5–5)
POTASSIUM SERPL-SCNC: 4.3 MMOL/L — SIGNIFICANT CHANGE UP (ref 3.5–5)
RBC # BLD: 3.45 M/UL — LOW (ref 4.7–6.1)
RBC # FLD: 13.1 % — SIGNIFICANT CHANGE UP (ref 11.5–14.5)
SODIUM SERPL-SCNC: 140 MMOL/L — SIGNIFICANT CHANGE UP (ref 135–146)
WBC # BLD: 8.12 K/UL — SIGNIFICANT CHANGE UP (ref 4.8–10.8)
WBC # FLD AUTO: 8.12 K/UL — SIGNIFICANT CHANGE UP (ref 4.8–10.8)

## 2019-08-21 PROCEDURE — 99239 HOSP IP/OBS DSCHRG MGMT >30: CPT

## 2019-08-21 RX ADMIN — Medication 15 UNIT(S): at 08:39

## 2019-08-21 RX ADMIN — AMPICILLIN SODIUM AND SULBACTAM SODIUM 200 GRAM(S): 250; 125 INJECTION, POWDER, FOR SUSPENSION INTRAMUSCULAR; INTRAVENOUS at 12:52

## 2019-08-21 RX ADMIN — SERTRALINE 50 MILLIGRAM(S): 25 TABLET, FILM COATED ORAL at 12:52

## 2019-08-21 RX ADMIN — Medication 0: at 08:38

## 2019-08-21 RX ADMIN — AMPICILLIN SODIUM AND SULBACTAM SODIUM 200 GRAM(S): 250; 125 INJECTION, POWDER, FOR SUSPENSION INTRAMUSCULAR; INTRAVENOUS at 05:32

## 2019-08-21 RX ADMIN — HEPARIN SODIUM 5000 UNIT(S): 5000 INJECTION INTRAVENOUS; SUBCUTANEOUS at 05:32

## 2019-08-21 RX ADMIN — Medication 81 MILLIGRAM(S): at 12:52

## 2019-08-21 RX ADMIN — PANTOPRAZOLE SODIUM 40 MILLIGRAM(S): 20 TABLET, DELAYED RELEASE ORAL at 08:39

## 2019-08-21 RX ADMIN — LISINOPRIL 40 MILLIGRAM(S): 2.5 TABLET ORAL at 05:32

## 2019-08-21 RX ADMIN — HEPARIN SODIUM 5000 UNIT(S): 5000 INJECTION INTRAVENOUS; SUBCUTANEOUS at 13:22

## 2019-08-21 RX ADMIN — Medication 15 UNIT(S): at 12:52

## 2019-08-21 NOTE — DISCHARGE NOTE PROVIDER - CARE PROVIDERS DIRECT ADDRESSES
[email or web address]

## 2019-08-21 NOTE — DISCHARGE NOTE PROVIDER - NSDCCPCAREPLAN_GEN_ALL_CORE_FT
PRINCIPAL DISCHARGE DIAGNOSIS  Diagnosis: Osteomyelitis  Assessment and Plan of Treatment: You had osteomyelitis in your left big toe, which is an infection in the bone, most likely secondary to diabetes. The toe was necrotic and gangrenous, and you had a partial amputation of the left big toe. You were placed on IV antibiotics in the hospital, with no evidence of infection. You will be discharged on Augmentin by mouth 875mg twice a day for 5 days. You also had imaging done on the arteries of both of your legs, which revealed a severe narrowing in the arteries of both legs. You have an appointment at the hospital for an angiogram on Tuesday, 8/2719. You need to refrain from eating and drinking starting at 11:59PM on Monday, 8/26/19. You also need to follow up with Dr. Evangelista, the vascular surgeon, before Tuesday 8/27.      SECONDARY DISCHARGE DIAGNOSES  Diagnosis: Diabetes mellitus  Assessment and Plan of Treatment: You have diabetes, which has been unctrolled. You need to resume Humulin 70/30 and Janumet on discharge, and must follow up with your endocrinologist as an outpatient. PRINCIPAL DISCHARGE DIAGNOSIS  Diagnosis: Osteomyelitis  Assessment and Plan of Treatment: You had osteomyelitis in your left big toe, which is an infection in the bone, most likely secondary to diabetes. The toe was necrotic and gangrenous, and you had a partial amputation of the left big toe. You were placed on IV antibiotics in the hospital, with no evidence of infection. You will be discharged on Augmentin by mouth 875mg twice a day for 5 days. You also had imaging done on the arteries of both of your legs, which revealed a severe narrowing in the arteries of both legs. You have an appointment at the hospital for an angiogram on Tuesday, 8/2719. You need to refrain from eating and drinking starting at 11:59PM on Monday, 8/26/19. You also need to follow up with Dr. Evangelista, the vascular surgeon, before Tuesday 8/27. Continue to wear Darco boot for protection, as well as walker to help with ambulation.      SECONDARY DISCHARGE DIAGNOSES  Diagnosis: Diabetes mellitus  Assessment and Plan of Treatment: You have diabetes, which has been unctrolled. You need to resume Humulin 70/30 and Janumet on discharge, and must follow up with your primary care doctor and endocrinologist as an outpatient. PRINCIPAL DISCHARGE DIAGNOSIS  Diagnosis: Osteomyelitis  Assessment and Plan of Treatment: You had osteomyelitis in your left big toe, which is an infection in the bone, most likely secondary to diabetes. The toe was necrotic and gangrenous, and you had a partial amputation of the left big toe. You were placed on IV antibiotics in the hospital, with no evidence of infection. You will be discharged on Augmentin by mouth 875mg twice a day for 4 more days. You also had imaging done on the arteries of both of your legs, which revealed a severe narrowing in the arteries of both legs. You have an appointment at the hospital for an angiogram on Tuesday, 8/2719. You need to refrain from eating and drinking starting at 11:59PM on Monday, 8/26/19. You also need to follow up with Dr. Evangelista, the vascular surgeon, before Tuesday 8/27. Continue to wear Darco boot for protection, as well as walker to help with ambulation.      SECONDARY DISCHARGE DIAGNOSES  Diagnosis: Diabetes mellitus  Assessment and Plan of Treatment: You have diabetes, which has been unctrolled. You need to resume Humulin 70/30 and Janumet on discharge, and must follow up with your primary care doctor and endocrinologist as an outpatient.

## 2019-08-21 NOTE — PROGRESS NOTE ADULT - REASON FOR ADMISSION
left great toe infection

## 2019-08-21 NOTE — PROGRESS NOTE ADULT - SUBJECTIVE AND OBJECTIVE BOX
JOSÉ MANUEL PORTER  69y  Male  ***My note supersedes ALL resident notes that I sign.  My corrections for their notes are in my note.***    I can be reached directly on [employer] [id number]. My office number is [phone]. My personal cell number is [phone].    INTERVAL EVENTS: Here for f/u of DFU. Pt s/p debridement and partial amp. Pt has some pain in foot w/ wt bearing. He was requesting a walker to help offset his wt on his left foot, which is reasonable.    T(F): 98.6 (08-21-19 @ 04:50), Max: 98.6 (08-21-19 @ 04:50)  HR: 73 (08-21-19 @ 04:50) (68 - 73)  BP: 169/77 (08-21-19 @ 04:50) (124/58 - 169/77)  RR: 18 (08-21-19 @ 04:50) (18 - 20)  SpO2: --    Gen: NAD  neck: no nodes, no JVD  Lungs: clr  Hrt: s1 s2 rrr  and soft NT/ND  Ext rt 1st toe amp; left foot in dressing; no edema, no c/c    LABS:                        9.7     (    86.4   8.12  )-----------( ---------      267      ( 21 Aug 2019 07:30 )             29.8    (    13.1     140   (   106   (   146      08-21-19 @ 07:30  ----------------------               4.3   (   26   (   8                             -----                        0.9  Ca  8.7   Mg  1.4    P   --     PT/INR - ( 19 Aug 2019 16:55 )   PT: 12.60 sec;   INR: 1.10 ratio    PTT - ( 19 Aug 2019 16:55 )  PTT:32.9 sec    CAPILLARY BLOOD GLUCOSE  POCT Blood Glucose.: 79 (08-21-19 @ 11:26)  POCT Blood Glucose.: 120 (08-21-19 @ 08:16)  POCT Blood Glucose.: 89 (08-20-19 @ 21:02)  POCT Blood Glucose.: 187 (08-20-19 @ 17:02)  POCT Blood Glucose.: 200 (08-20-19 @ 12:26)  POCT Blood Glucose.: 193 (08-20-19 @ 06:27)  POCT Blood Glucose.: 188 (08-19-19 @ 23:00)  POCT Blood Glucose.: 159 (08-19-19 @ 21:07)    Culture - Tissue with Gram Stain (collected 08-20-19 @ 09:00)  Source: .Tissue None  Gram Stain (08-21-19 @ 04:56):    No polymorphonuclear cells seen per low power field    Rare Gram Negative Rods seen per oil power field    Culture - Other (collected 08-19-19 @ 16:22)  Source: .Other Left Hallux  Preliminary Report (08-21-19 @ 00:01):    Numerous Escherichia coli    Numerous Klebsiella variicola    Moderate Enterococcus faecalis    RADIOLOGY & ADDITIONAL TESTS:  < from: Xray Foot AP + Lateral + Oblique, Left (08.20.19 @ 12:30) >  IMPRESSION:    Status post partial great toe amputation with surrounding soft tissue gas   and swelling, likely postsurgical.    No evidence of new acute fracture or dislocation.    Small posterior calcaneal spur.    Vascular calcifications.    < end of copied text >      < from: VA Duplex Lower Extrem Arterial, Bilat (08.19.19 @ 17:40) >  Impression:    Posterior tibial artery with high-grade stenosis.    < end of copied text >      MEDICATIONS:  ampicillin/sulbactam  IVPB 3 Gram(s) IV Intermittent every 6 hours    aspirin enteric coated 81 milliGRAM(s) Oral daily  atorvastatin 40 milliGRAM(s) Oral at bedtime  heparin  Injectable 5000 Unit(s) SubCutaneous every 8 hours  insulin glargine Injectable (LANTUS) 45 Unit(s) SubCutaneous at bedtime  insulin lispro (HumaLOG) corrective regimen sliding scale   SubCutaneous three times a day before meals  insulin lispro Injectable (HumaLOG) 15 Unit(s) SubCutaneous three times a day before meals  lisinopril 40 milliGRAM(s) Oral daily  pantoprazole    Tablet 40 milliGRAM(s) Oral before breakfast  sertraline 50 milliGRAM(s) Oral daily  sodium chloride 0.9%. 1000 milliLiter(s) IV Continuous <Continuous>

## 2019-08-21 NOTE — PROGRESS NOTE ADULT - ASSESSMENT
70yo M with PMH of insulin-dependent diabetes mellitus , DFU, and R great toe amputation presenting with infected left great toe infection.     # infected left hallux; DFU s/p partial amp 1st toe and debridement; clean margins obtained (see ID note)  abx: augmentin 875mg po q12 x 5 days from day of surgery  wound care per podiatry  Darco shoe b/l  walker  outpt pod f/u    # PAD - + A duplex w/ stenosis post-tib art on left  cont asa  outpt vasc eval - poss angio    # insulin-dependent diabetes mellitus:  lant 45 and lisp 15 while here  can resume combo of orals and insulin as outpt (home meds and doses)  f/w w/ PMD    # HTN: reasonable control for now  c/w ACE    # DLD: c/w statin    # DVT ppx: hep sc can change to LMWH as Cr is nl ( pt going home anyway)    # activity: WBAT to both feet; Darco shoes; use walker (gave Rx to CM)    dispo: d/c home today

## 2019-08-21 NOTE — DISCHARGE NOTE PROVIDER - CARE PROVIDER_API CALL
Javid Evangelista)  Surgery; Vascular Surgery  [address] [place] [address], [address]  [place] [address]  Phone: [phone]  Fax: [phone]  Follow Up Time: 1-3 days Javid Evangelista)  Surgery; Vascular Surgery  [address] [place] [address], [address]  [place] [address]  Phone: [phone]  Fax: [phone]  Follow Up Time: 1-3 [place] [address]  Phone: [phone]  Fax: (   )    -  Follow Up Time: 1 week

## 2019-08-21 NOTE — CHART NOTE - NSCHARTNOTEFT_GEN_A_CORE
<<<RESIDENT DISCHARGE NOTE>>>     JOSÉ MANUEL PORTER  [id number]    VITAL SIGNS:  T(F): 97.6 (08-21-19 @ 13:34), Max: 98.6 (08-21-19 @ 04:50)  HR: 83 (08-21-19 @ 13:34)  BP: 134/66 (08-21-19 @ 13:34)  SpO2: --      PHYSICAL EXAMINATION:  Gen: NAD  neck: no nodes, no JVD  Lungs: clr  Hrt: s1 s2 rrr  and soft NT/ND  Ext rt 1st toe amp; left foot in dressing; no edema, no c/c  TEST RESULTS:                        9.7    8.12  )-----------( 267      ( 21 Aug 2019 07:30 )             29.8       08-21    140  |  106  |  8<L>  ----------------------------<  146<H>  4.3   |  26  |  0.9    Ca    8.7      21 Aug 2019 07:30  Mg     1.4     08-21    TPro  6.6  /  Alb  3.8  /  TBili  0.6  /  DBili  x   /  AST  11  /  ALT  14  /  AlkPhos  103  08-19      FINAL DISCHARGE INTERVIEW:  Resident(s) Present: (Name:Dr. Newell), RN Present: (Name:  ___________)    DISCHARGE MEDICATION RECONCILIATION  reviewed with Attending (Name:_Dr. Finch_)    DISPOSITION:   [  ] Home,    [ x ] Home with Visiting Nursing Services,   [    ]  SNF/ NH,    [   ] Acute Rehab (4A),   [   ] Other (Specify:_________)

## 2019-08-21 NOTE — DISCHARGE NOTE PROVIDER - PROVIDER TOKENS
PROVIDER:[TOKEN:[[id number]],FOLLOWUP:[1-3 days]] PROVIDER:[TOKEN:[[id number]],FOLLOWUP:[1-3 days]],FREE:[LAST:[[employer]],PHONE:[phone]],FAX:[(   )    -],ADDRESS:[[address] [place] [address]],FOLLOWUP:[1 week]]

## 2019-08-21 NOTE — PROGRESS NOTE ADULT - SUBJECTIVE AND OBJECTIVE BOX
Podiatry Progress Note    Subjective:   JOSÉ MANUEL PORTER is a pleasant well-nourished, well developed 69y Male in no acute distress, alert awake, and oriented to person, place and time.   Patient is a 69y old  Male who presents with a chief complaint of left great toe infection (21 Aug 2019 09:11)  Patient was seen bedside today during AM Rounds w/ Attending. Patient says he is feeling better and is curious what the next steps and plan was for him. Patient currently denies F/N/V and shortness of breath.    HPI:  68yo M with PMH of insulin-dependent diabetes mellitus , DFU, and R great toe amputation presenting with infected left great toe infection.   Patient was sent by his podiatrist Dr. Bhatia for admission for IV abx, pre-op eval for amputation of left great toe. Patient denies any known fevers, n/v, CP, SOB, abd pain, or injuries/traumas/falls. Patient has had his wound since 10/18 and has been receiving regular wound care. Has foul odor and left great toe pain.     In ED pt had tachycardia but no fever, WBC nl, Cr 1.3, resolved after he received vancomycin and zosyn and 1 L LR. (19 Aug 2019 20:14)      Past Medical History and Surgical History  GERD (gastroesophageal reflux disease)  Depression  Diabetic foot ulcer  Dyslipidemia  HTN (hypertension)  DM (diabetes mellitus): 12/27/18 hgb aic  11.2  Toe amputation status, right: (2008)     Review of Systems:   Constitutional: No weakness, fevers or chills  Eyes / ENT: No visual changes; No vertigo or throat pain   Neck: No pain or stiffness  Respiratory: No cough, wheezing, hemoptysis; No shortness of breath  Cardiovascular: No chest pain or palpitations  Gastrointestinal: No abdominal or epigastric pain. No nausea, vomiting, or hematemesis; No diarrhea or constipation. No melena or hematochezia.  Genitourinary: No dysuria, frequency or hematuria  Neurological: No numbness or weakness  Skin: Left Hallux Surgical Site s/p Left Hallux Amputation     Objective:  Vital Signs Last 24 Hrs  T(C): 37 (21 Aug 2019 04:50), Max: 37.1 (20 Aug 2019 10:15)  T(F): 98.6 (21 Aug 2019 04:50), Max: 98.8 (20 Aug 2019 10:15)  HR: 73 (21 Aug 2019 04:50) (63 - 73)  BP: 169/77 (21 Aug 2019 04:50) (124/58 - 169/77)  BP(mean): --  RR: 18 (21 Aug 2019 04:50) (14 - 20)  SpO2: 99% (20 Aug 2019 10:15) (99% - 100%)                        9.7    8.12  )-----------( 267      ( 21 Aug 2019 07:30 )             29.8                 08-21    140  |  106  |  8<L>  ----------------------------<  146<H>  4.3   |  26  |  0.9    Ca    8.7      21 Aug 2019 07:30  Mg     1.4     08-21    TPro  6.6  /  Alb  3.8  /  TBili  0.6  /  DBili  x   /  AST  11  /  ALT  14  /  AlkPhos  103  08-19    Physical Exam - Lower Extremity Focused:  Left Foot   Derm:   Dressing was dry, clean, intact w/o malodor. Mild Serosanguinous drainage noted   Sutures intact, wound edges are well coapted, and no signs of wound dehiscences    Vascular:   DP and PT Pulses diminished Left Foot     Assessment:   s/p Partial Hallux Amputation Left Foot 8/20  Necrotic / Gangrenous Left Hallux     Plan:  Chart reviewed and Patient evaluated. All questions and concerns were addressed answered.   Wound irrigated w/ Normal Saline; Packed w/ 1/4 Iodoform / Betadine / DSD / Kerlix   Local Wound Care: Irrigate w/ Normal Saline; 1/4 Iodoform Packing / Betadine on incision site / DSD / Kerlix   A-Duplex; High grade Stenosis B/L Tibial Arterials; Consult Vascular   POXR-L; No Discrepancies or Abnormalities   Cont IV. Abx; as per ID  Weight Bearing Status; WBAT w/ Heel Touch w/ Darco Shoe   Attending Updated and Aware Podiatry Progress Note    Subjective:   JOSÉ MANUEL PORTER is a pleasant well-nourished, well developed 69y Male in no acute distress, alert awake, and oriented to person, place and time.   Patient is a 69y old  Male who presents with a chief complaint of left great toe infection (21 Aug 2019 09:11)  Patient was seen bedside today during AM Rounds w/ Attending. Patient says he is feeling better and is curious what the next steps and plan was for him. Patient currently denies F/N/V and shortness of breath.    HPI:  68yo M with PMH of insulin-dependent diabetes mellitus , DFU, and R great toe amputation presenting with infected left great toe infection.   Patient was sent by his podiatrist Dr. Bhatia for admission for IV abx, pre-op eval for amputation of left great toe. Patient denies any known fevers, n/v, CP, SOB, abd pain, or injuries/traumas/falls. Patient has had his wound since 10/18 and has been receiving regular wound care. Has foul odor and left great toe pain.     In ED pt had tachycardia but no fever, WBC nl, Cr 1.3, resolved after he received vancomycin and zosyn and 1 L LR. (19 Aug 2019 20:14)      Past Medical History and Surgical History  GERD (gastroesophageal reflux disease)  Depression  Diabetic foot ulcer  Dyslipidemia  HTN (hypertension)  DM (diabetes mellitus): 12/27/18 hgb aic  11.2  Toe amputation status, right: (2008)     Review of Systems:   Constitutional: No weakness, fevers or chills  Eyes / ENT: No visual changes; No vertigo or throat pain   Neck: No pain or stiffness  Respiratory: No cough, wheezing, hemoptysis; No shortness of breath  Cardiovascular: No chest pain or palpitations  Gastrointestinal: No abdominal or epigastric pain. No nausea, vomiting, or hematemesis; No diarrhea or constipation. No melena or hematochezia.  Genitourinary: No dysuria, frequency or hematuria  Neurological: No numbness or weakness  Skin: Left Hallux Surgical Site s/p Left Hallux Amputation     Objective:  Vital Signs Last 24 Hrs  T(C): 37 (21 Aug 2019 04:50), Max: 37.1 (20 Aug 2019 10:15)  T(F): 98.6 (21 Aug 2019 04:50), Max: 98.8 (20 Aug 2019 10:15)  HR: 73 (21 Aug 2019 04:50) (63 - 73)  BP: 169/77 (21 Aug 2019 04:50) (124/58 - 169/77)  BP(mean): --  RR: 18 (21 Aug 2019 04:50) (14 - 20)  SpO2: 99% (20 Aug 2019 10:15) (99% - 100%)                        9.7    8.12  )-----------( 267      ( 21 Aug 2019 07:30 )             29.8                 08-21    140  |  106  |  8<L>  ----------------------------<  146<H>  4.3   |  26  |  0.9    Ca    8.7      21 Aug 2019 07:30  Mg     1.4     08-21    TPro  6.6  /  Alb  3.8  /  TBili  0.6  /  DBili  x   /  AST  11  /  ALT  14  /  AlkPhos  103  08-19    Physical Exam - Lower Extremity Focused:  Left Foot   Derm:   Dressing was dry, clean, intact w/o malodor. Mild Serosanguinous drainage noted   Sutures intact, wound edges are well coapted, and no signs of wound dehiscences    Vascular:   DP and PT Pulses diminished Left Foot     Assessment:   s/p Partial Hallux Amputation Left Foot 8/20  Necrotic / Gangrenous Left Hallux     Plan:  Chart reviewed and Patient evaluated. All questions and concerns were addressed answered.   Wound irrigated w/ Normal Saline; Packed w/ 1/4 Iodoform / Betadine / DSD / Kerlix   Local Wound Care: Irrigate w/ Normal Saline; 1/4 Iodoform Packing / Betadine on incision site / DSD / Kerlix   A-Duplex; High grade Stenosis B/L Tibial Arterials; Request Vascular Consult    POXR-L; No Discrepancies or Abnormalities   Cont IV. Abx; as per ID  Weight Bearing Status; WBAT w/ Heel Touch w/ Darco Shoe   Attending Updated and Aware Podiatry Progress Note    Subjective:   JOSÉ MANUEL PORTER is a pleasant well-nourished, well developed 69y Male in no acute distress, alert awake, and oriented to person, place and time.   Patient is a 69y old  Male who presents with a chief complaint of left great toe infection (21 Aug 2019 09:11)  Patient was seen bedside today during AM Rounds w/ Attending. Patient says he is feeling better and is curious what the next steps and plan was for him. Patient currently denies F/N/V and shortness of breath.    HPI:  68yo M with PMH of insulin-dependent diabetes mellitus , DFU, and R great toe amputation presenting with infected left great toe infection.   Patient was sent by his podiatrist Dr. Bhatia for admission for IV abx, pre-op eval for amputation of left great toe. Patient denies any known fevers, n/v, CP, SOB, abd pain, or injuries/traumas/falls. Patient has had his wound since 10/18 and has been receiving regular wound care. Has foul odor and left great toe pain.     In ED pt had tachycardia but no fever, WBC nl, Cr 1.3, resolved after he received vancomycin and zosyn and 1 L LR. (19 Aug 2019 20:14)      Past Medical History and Surgical History  GERD (gastroesophageal reflux disease)  Depression  Diabetic foot ulcer  Dyslipidemia  HTN (hypertension)  DM (diabetes mellitus): 12/27/18 hgb aic  11.2  Toe amputation status, right: (2008)     Review of Systems:   Constitutional: No weakness, fevers or chills  Eyes / ENT: No visual changes; No vertigo or throat pain   Neck: No pain or stiffness  Respiratory: No cough, wheezing, hemoptysis; No shortness of breath  Cardiovascular: No chest pain or palpitations  Gastrointestinal: No abdominal or epigastric pain. No nausea, vomiting, or hematemesis; No diarrhea or constipation. No melena or hematochezia.  Genitourinary: No dysuria, frequency or hematuria  Neurological: No numbness or weakness  Skin: Left Hallux Surgical Site s/p Left Hallux Amputation     Objective:  Vital Signs Last 24 Hrs  T(C): 37 (21 Aug 2019 04:50), Max: 37.1 (20 Aug 2019 10:15)  T(F): 98.6 (21 Aug 2019 04:50), Max: 98.8 (20 Aug 2019 10:15)  HR: 73 (21 Aug 2019 04:50) (63 - 73)  BP: 169/77 (21 Aug 2019 04:50) (124/58 - 169/77)  BP(mean): --  RR: 18 (21 Aug 2019 04:50) (14 - 20)  SpO2: 99% (20 Aug 2019 10:15) (99% - 100%)                        9.7    8.12  )-----------( 267      ( 21 Aug 2019 07:30 )             29.8                 08-21    140  |  106  |  8<L>  ----------------------------<  146<H>  4.3   |  26  |  0.9    Ca    8.7      21 Aug 2019 07:30  Mg     1.4     08-21    TPro  6.6  /  Alb  3.8  /  TBili  0.6  /  DBili  x   /  AST  11  /  ALT  14  /  AlkPhos  103  08-19    Physical Exam - Lower Extremity Focused:  Left Foot   Derm:   Dressing was dry, clean, intact w/o malodor. Mild Serosanguinous drainage noted   Sutures intact, wound edges are well coapted, and no signs of wound dehiscences    Vascular:   DP and PT Pulses diminished Left Foot     Assessment:   s/p Partial Hallux Amputation Left Foot 8/20  Necrotic / Gangrenous Left Hallux     Plan:  Chart reviewed and Patient evaluated. All questions and concerns were addressed answered.   Wound irrigated w/ Normal Saline; Packed w/ 1/4 Iodoform / Betadine / DSD / Kerlix   Local Wound Care: Irrigate w/ Normal Saline; 1/4 Iodoform Packing / Betadine on incision site / DSD / Kerlix     A-Duplex; High grade Stenosis B/L Tibial Arterials;   Resident spoke w/ Dr. Evangelista and will follow up as an outpatient for possible Angio     Patient stable for Discharge from Podiatry Standpoint; Require Visiting Nurse Service for dressing changes Q48  Wound Care Orders: Flush w/ Normal Saline; Betadine soaked Adaptic / DSD / Kerlix Q48   Weight Bearing Status: WBAT w/ Heel Touch w/ Darco Shoe on Discharge     Attending Updated and Aware

## 2019-08-21 NOTE — PROGRESS NOTE ADULT - SUBJECTIVE AND OBJECTIVE BOX
JOSÉ MANUEL PORTER  69y, Male  Allergy: No Known Allergies      CHIEF COMPLAINT: left great toe infection (20 Aug 2019 14:38)      INTERVAL EVENTS/HPI  - No acute events overnight  WCX   Numerous Escherichia coli    Numerous Klebsiella variicola    Moderate Enterococcus faecalis  - T(F): , Max: 98.8 (08-20-19 @ 10:15)  - Denies any worsening symptoms  - Tolerating medication  - WBC Count: 8.12 K/uL (08-21-19 @ 07:30)      ROS  General: Denies rigors, nightsweats  HEENT: Denies headache, rhinorrhea, sore throat, eye pain  CV: Denies CP, palpitations  PULM: Denies SOB, wheezing  GI: Denies abdominal pain, hematochezia/melena  : Denies dysuria, hematuria  MSK: Denies arthralgias, myalgias  SKIN: Denies rash, lesions  NEURO: Denies paresthesias, weakness  PSYCH: Denies depression, anxiety    VITALS:  T(F): 98.6, Max: 98.8 (08-20-19 @ 10:15)  HR: 73  BP: 169/77  RR: 18Vital Signs Last 24 Hrs  T(C): 37 (21 Aug 2019 04:50), Max: 37.1 (20 Aug 2019 10:15)  T(F): 98.6 (21 Aug 2019 04:50), Max: 98.8 (20 Aug 2019 10:15)  HR: 73 (21 Aug 2019 04:50) (63 - 75)  BP: 169/77 (21 Aug 2019 04:50) (124/58 - 169/77)  BP(mean): --  RR: 18 (21 Aug 2019 04:50) (14 - 20)  SpO2: 99% (20 Aug 2019 10:15) (99% - 100%)    PHYSICAL EXAM:  Gen: NAD, resting in bed  HEENT: Normocephalic, atraumatic  Neck: supple, no lymphadenopathy  CV: Regular rate & regular rhythm  Lungs: decreased BS at bases, no fremitus  Abdomen: Soft, BS present  Ext: Warm, well perfused  Neuro: non focal, awake  Skin: no rash, no erythema  Lines: no phlebitis    FH: Non-contributory  Social Hx: Non-contributory    TESTS & MEASUREMENTS:                        9.7    8.12  )-----------( 267      ( 21 Aug 2019 07:30 )             29.8     08-21    140  |  106  |  8<L>  ----------------------------<  146<H>  4.3   |  26  |  0.9    Ca    8.7      21 Aug 2019 07:30  Mg     1.4     08-21    TPro  6.6  /  Alb  3.8  /  TBili  0.6  /  DBili  x   /  AST  11  /  ALT  14  /  AlkPhos  103  08-19    eGFR if Non African American: 86 mL/min/1.73M2 (08-21-19 @ 07:30)  eGFR if : 99 mL/min/1.73M2 (08-21-19 @ 07:30)    LIVER FUNCTIONS - ( 19 Aug 2019 14:58 )  Alb: 3.8 g/dL / Pro: 6.6 g/dL / ALK PHOS: 103 U/L / ALT: 14 U/L / AST: 11 U/L / GGT: x               Culture - Tissue with Gram Stain (collected 08-20-19 @ 09:00)  Source: .Tissue None  Gram Stain (08-21-19 @ 04:56):    No polymorphonuclear cells seen per low power field    Rare Gram Negative Rods seen per oil power field    Culture - Other (collected 08-19-19 @ 16:22)  Source: .Other Left Hallux  Preliminary Report (08-21-19 @ 00:01):    Numerous Escherichia coli    Numerous Klebsiella variicola    Moderate Enterococcus faecalis            INFECTIOUS DISEASES TESTING      RADIOLOGY & ADDITIONAL TESTS:  I have personally reviewed the last Chest xray  CXR      CT      CARDIOLOGY TESTING  12 Lead ECG:   Ventricular Rate 72 BPM    Atrial Rate 72 BPM    P-R Interval 140 ms    QRS Duration 68 ms    Q-T Interval 386 ms    QTC Calculation(Bezet) 422 ms    P Axis 52 degrees    R Axis 4 degrees    T Axis 6 degrees    Diagnosis Line Normal sinus rhythm  Nonspecific T wave abnormality  Abnormal ECG    Confirmed by DAVE COWAN MD ([id number]) on 8/20/2019 9:01:52 AM (08-20-19 @ 07:45)      MEDICATIONS  ampicillin/sulbactam  IVPB 3  aspirin enteric coated 81  atorvastatin 40  dextrose 5%. 1000  dextrose 50% Injectable 12.5  dextrose 50% Injectable 25  dextrose 50% Injectable 25  heparin  Injectable 5000  insulin glargine Injectable (LANTUS) 45  insulin lispro (HumaLOG) corrective regimen sliding scale   insulin lispro Injectable (HumaLOG) 15  lisinopril 40  pantoprazole    Tablet 40  sertraline 50  sodium chloride 0.9%. 1000      ANTIBIOTICS:  ampicillin/sulbactam  IVPB 3 Gram(s) IV Intermittent every 6 hours      All available historical records have been reviewed

## 2019-08-21 NOTE — PROGRESS NOTE ADULT - ASSESSMENT
ASSESSMENT  68yo M with PMH of insulin-dependent diabetes mellitus , DFU, and R great toe amputation presenting with infected left great toe infection.   s/p amputation resected bone, proximal clearing margin path and culture (head of prox phalanx)    IMPRESSION  #DFU s/p amputation    Sepsis ruled out on admission   8/19 WCX   Numerous Escherichia coli    Numerous Klebsiella variicola    Moderate Enterococcus faecalis    RECOMMENDATIONS  - F/u final OR cultures and path, GNR sensitivities   - cont unasyn while in-house, as good margins in the OR, d/c on po augmentin 875mg BIDx 5 days (tailor abx to cultures)    Spectra [id number]

## 2019-08-22 LAB
-  AMPICILLIN: SIGNIFICANT CHANGE UP
-  TETRACYCLINE: SIGNIFICANT CHANGE UP
-  VANCOMYCIN: SIGNIFICANT CHANGE UP
CULTURE RESULTS: SIGNIFICANT CHANGE UP
METHOD TYPE: SIGNIFICANT CHANGE UP
ORGANISM # SPEC MICROSCOPIC CNT: SIGNIFICANT CHANGE UP
SPECIMEN SOURCE: SIGNIFICANT CHANGE UP
SURGICAL PATHOLOGY STUDY: SIGNIFICANT CHANGE UP

## 2019-08-26 ENCOUNTER — APPOINTMENT (OUTPATIENT)
Dept: VASCULAR SURGERY | Facility: CLINIC | Age: 69
End: 2019-08-26
Payer: MEDICARE

## 2019-08-26 VITALS
HEIGHT: 71 IN | SYSTOLIC BLOOD PRESSURE: 140 MMHG | DIASTOLIC BLOOD PRESSURE: 90 MMHG | BODY MASS INDEX: 28.84 KG/M2 | WEIGHT: 206 LBS

## 2019-08-26 PROCEDURE — 99213 OFFICE O/P EST LOW 20 MIN: CPT

## 2019-08-26 NOTE — DATA REVIEWED
[FreeTextEntry1] : Left leg arterial duplex last week showed possible left posterior tibial artery stenosis \par

## 2019-08-26 NOTE — HISTORY OF PRESENT ILLNESS
[FreeTextEntry1] : 70 y/o gentleman with h/o DM, with PVD, nonhealing wounds to left foot underwent left PTA angioplasty on 1/18/19. He  was recently admitted to [facility] and had a left foot 1st toe amputation. He had tibial vessel disease present on duplex. The patient presents today for evaluation and discussion  for a left leg angiogram.\par \par The left first toe amp site seems to be healing with stitches in.

## 2019-08-26 NOTE — ASSESSMENT
[FreeTextEntry1] : The patient is a 68 yo male with tibial vessels disease and 1st  toe amputation and 4th and 5th toe wounds. the patient should follow up in 3 weeks to see if the wounds are healing. At that time I will reassess the wounds and discuss the possibility of an left leg angiogram. He will f/u with podiatry.

## 2019-08-28 ENCOUNTER — OUTPATIENT (OUTPATIENT)
Dept: OUTPATIENT SERVICES | Facility: HOSPITAL | Age: 69
LOS: 1 days | Discharge: HOME | End: 2019-08-28

## 2019-08-28 ENCOUNTER — APPOINTMENT (OUTPATIENT)
Dept: PODIATRY | Facility: CLINIC | Age: 69
End: 2019-08-28
Payer: MEDICARE

## 2019-08-28 VITALS
HEIGHT: 71 IN | BODY MASS INDEX: 28.84 KG/M2 | DIASTOLIC BLOOD PRESSURE: 70 MMHG | WEIGHT: 206 LBS | SYSTOLIC BLOOD PRESSURE: 119 MMHG | HEART RATE: 73 BPM

## 2019-08-28 DIAGNOSIS — E11.69 TYPE 2 DIABETES MELLITUS WITH OTHER SPECIFIED COMPLICATION: ICD-10-CM

## 2019-08-28 DIAGNOSIS — I10 ESSENTIAL (PRIMARY) HYPERTENSION: ICD-10-CM

## 2019-08-28 DIAGNOSIS — Z89.421 ACQUIRED ABSENCE OF OTHER RIGHT TOE(S): Chronic | ICD-10-CM

## 2019-08-28 DIAGNOSIS — M86.9 OSTEOMYELITIS, UNSPECIFIED: ICD-10-CM

## 2019-08-28 DIAGNOSIS — F32.9 MAJOR DEPRESSIVE DISORDER, SINGLE EPISODE, UNSPECIFIED: ICD-10-CM

## 2019-08-28 DIAGNOSIS — L03.032 CELLULITIS OF LEFT TOE: ICD-10-CM

## 2019-08-28 PROCEDURE — 99024 POSTOP FOLLOW-UP VISIT: CPT

## 2019-08-28 NOTE — PHYSICAL EXAM
[General Appearance - Alert] : alert [General Appearance - In No Acute Distress] : in no acute distress [FreeTextEntry1] : sutures intact. skin well reapproximated. no necroitc skin edges. left 4th digit persistent eschar

## 2019-08-28 NOTE — HISTORY OF PRESENT ILLNESS
[FreeTextEntry1] : pt doing well. receives home vns with local wound care consisting of betainde dressing

## 2019-09-12 ENCOUNTER — APPOINTMENT (OUTPATIENT)
Dept: PODIATRY | Facility: CLINIC | Age: 69
End: 2019-09-12
Payer: MEDICARE

## 2019-09-12 ENCOUNTER — OUTPATIENT (OUTPATIENT)
Dept: OUTPATIENT SERVICES | Facility: HOSPITAL | Age: 69
LOS: 1 days | Discharge: HOME | End: 2019-09-12
Payer: MEDICARE

## 2019-09-12 ENCOUNTER — OUTPATIENT (OUTPATIENT)
Dept: OUTPATIENT SERVICES | Facility: HOSPITAL | Age: 69
LOS: 1 days | Discharge: HOME | End: 2019-09-12

## 2019-09-12 DIAGNOSIS — Z89.421 ACQUIRED ABSENCE OF OTHER RIGHT TOE(S): Chronic | ICD-10-CM

## 2019-09-12 DIAGNOSIS — T14.8XXA OTHER INJURY OF UNSPECIFIED BODY REGION, INITIAL ENCOUNTER: ICD-10-CM

## 2019-09-12 PROCEDURE — 99213 OFFICE O/P EST LOW 20 MIN: CPT | Mod: 24

## 2019-09-12 PROCEDURE — 73630 X-RAY EXAM OF FOOT: CPT | Mod: 26,RT

## 2019-09-13 NOTE — PHYSICAL EXAM
[General Appearance - Alert] : alert [General Appearance - In No Acute Distress] : in no acute distress [Oriented To Time, Place, And Person] : oriented to person, place, and time [FreeTextEntry1] : sutures intact. skin well reapproximated. no necroitc skin edges (amputation stump). Right plantar midfoot puncture wound with macerated periwound. No necrotic or devitalized tissue appreciated. No drainage. No erythema.

## 2019-09-13 NOTE — HISTORY OF PRESENT ILLNESS
[FreeTextEntry1] : Patient relates a new injury. Stepped on a nail with his right  foot 3 days ago. patient did not see medical attention. States last tetanus shot was 2-3 years ago. Denies any pain in the left foot

## 2019-09-13 NOTE — ASSESSMENT
[FreeTextEntry1] : -removal of sutures from the amputation stump. application of betadine and gauze dressing\par -right plantar wound washed and cleansed\par -Rx augmentin and cipro\par -referred for right foot xrays\par -return one week

## 2019-09-16 ENCOUNTER — APPOINTMENT (OUTPATIENT)
Dept: VASCULAR SURGERY | Facility: CLINIC | Age: 69
End: 2019-09-16
Payer: MEDICARE

## 2019-09-16 DIAGNOSIS — Z02.9 ENCOUNTER FOR ADMINISTRATIVE EXAMINATIONS, UNSPECIFIED: ICD-10-CM

## 2019-09-16 PROCEDURE — 99213 OFFICE O/P EST LOW 20 MIN: CPT

## 2019-09-16 NOTE — ASSESSMENT
[FreeTextEntry1] : 70 y/o gentleman with h/o DM, with PVD, nonhealing wounds to left foot underwent left PTA angioplasty on 1/18/19. He  was recently admitted to [facility] and had a left foot 1st toe amputation. 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 \par The left first toe amputation site seems to be healing well, sutures were removed with Podiatry. He stepped on a nail and developed a right plantar foot wound and was prescribed oral antibiotics by podiatry. The wounds are healing well on bilateral feet and no need for endovascular revascularization at this time. He will f/u with podiatry and see me back in 3 weeks for follow up and wound check.

## 2019-09-16 NOTE — HISTORY OF PRESENT ILLNESS
[FreeTextEntry1] : 70 y/o gentleman with h/o DM, with PVD, nonhealing wounds to left foot underwent left PTA angioplasty on 1/18/19. He  was recently admitted to [facility] and had a left foot 1st toe amputation. 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 \par The left first toe amputation site seems to be healing well, sutures were removed with Podiatry. He stepped on a nail and developed a right plantar foot wound and was prescribed oral antibiotics by podiatry.

## 2019-09-19 ENCOUNTER — OUTPATIENT (OUTPATIENT)
Dept: OUTPATIENT SERVICES | Facility: HOSPITAL | Age: 69
LOS: 1 days | Discharge: HOME | End: 2019-09-19

## 2019-09-19 ENCOUNTER — APPOINTMENT (OUTPATIENT)
Dept: PODIATRY | Facility: CLINIC | Age: 69
End: 2019-09-19
Payer: MEDICARE

## 2019-09-19 DIAGNOSIS — T14.8XXA OTHER INJURY OF UNSPECIFIED BODY REGION, INITIAL ENCOUNTER: ICD-10-CM

## 2019-09-19 DIAGNOSIS — Z89.421 ACQUIRED ABSENCE OF OTHER RIGHT TOE(S): Chronic | ICD-10-CM

## 2019-09-19 DIAGNOSIS — L97.519 NON-PRESSURE CHRONIC ULCER OF OTHER PART OF RIGHT FOOT WITH UNSPECIFIED SEVERITY: ICD-10-CM

## 2019-09-19 PROCEDURE — 11042 DBRDMT SUBQ TIS 1ST 20SQCM/<: CPT | Mod: 79,RT

## 2019-09-22 NOTE — PHYSICAL EXAM
[General Appearance - Alert] : alert [General Appearance - In No Acute Distress] : in no acute distress [Oriented To Time, Place, And Person] : oriented to person, place, and time [FreeTextEntry1] : left halux amputation stump has healed. right plantar wound has increased in size 1cm x 1cm. no probe to bone no erythema. no drainage. fibrotic tissue base. surround macerated skin secondary to occlusive bandages applied by patient

## 2019-09-22 NOTE — ASSESSMENT
[FreeTextEntry1] : -xrays reviewed. no bone infection appreciated\par -ulcer  excisionally debrided of fibrotic/devitalized tissue down to subcutaneous layer. Performed under sterile conditions with curette and scalpel. right foot\par -Patient was instructed to reduce pressure on the foot and encouraged to remain NWB.\par -left amputation stump has healed. no dressing needed. pt can bath foot\par -Dressed with Silvadene and DSD\par -home LWC: daily sanyl and gauze dressing. pt instructed not overapply tape as it will cause more maceraiton\par -return one week\par \par \par

## 2019-09-22 NOTE — HISTORY OF PRESENT ILLNESS
[FreeTextEntry1] : Patient is following up for right plantar wound after stepping on a nail. Pt is taking antibiotics and has been performing daily dressing changes. Patient has VNS for surgical dressing changes s/p left hallux amputation

## 2019-09-26 ENCOUNTER — OUTPATIENT (OUTPATIENT)
Dept: OUTPATIENT SERVICES | Facility: HOSPITAL | Age: 69
LOS: 1 days | Discharge: HOME | End: 2019-09-26

## 2019-09-26 ENCOUNTER — APPOINTMENT (OUTPATIENT)
Dept: PODIATRY | Facility: CLINIC | Age: 69
End: 2019-09-26
Payer: MEDICARE

## 2019-09-26 DIAGNOSIS — T14.8XXA OTHER INJURY OF UNSPECIFIED BODY REGION, INITIAL ENCOUNTER: ICD-10-CM

## 2019-09-26 DIAGNOSIS — Z89.421 ACQUIRED ABSENCE OF OTHER RIGHT TOE(S): Chronic | ICD-10-CM

## 2019-09-26 PROCEDURE — 11042 DBRDMT SUBQ TIS 1ST 20SQCM/<: CPT

## 2019-09-27 PROBLEM — T14.8XXA PUNCTURE WOUND: Status: ACTIVE | Noted: 2019-09-12

## 2019-09-27 NOTE — ASSESSMENT
[FreeTextEntry1] : -xrays reviewed. no bone infection appreciated\par -ulcer  excisionally debrided of fibrotic/devitalized tissue down to subcutaneous layer. Performed under sterile conditions with curette and scalpel. right foot\par -Dressed with Silvadene and DSD\par -home LWC: daily sanyl and gauze dressing.\par -encouraged to use forefoot wedge shoe. given last visit\par -return one week\par \par \par

## 2019-09-27 NOTE — PHYSICAL EXAM
[General Appearance - In No Acute Distress] : in no acute distress [General Appearance - Alert] : alert [Oriented To Time, Place, And Person] : oriented to person, place, and time [FreeTextEntry1] : right plantar wound  measures 1cm x 1cm. no probe to bone no erythema. no drainage. maerated sly-ulcerative skin fibrotic tissue base.  surround macerated skin secondary to occlusive bandages applied by patient

## 2019-10-03 ENCOUNTER — OUTPATIENT (OUTPATIENT)
Dept: OUTPATIENT SERVICES | Facility: HOSPITAL | Age: 69
LOS: 1 days | Discharge: HOME | End: 2019-10-03

## 2019-10-03 ENCOUNTER — APPOINTMENT (OUTPATIENT)
Dept: PODIATRY | Facility: CLINIC | Age: 69
End: 2019-10-03
Payer: MEDICARE

## 2019-10-03 ENCOUNTER — APPOINTMENT (OUTPATIENT)
Dept: ENDOCRINOLOGY | Facility: CLINIC | Age: 69
End: 2019-10-03
Payer: MEDICARE

## 2019-10-03 VITALS
HEART RATE: 70 BPM | WEIGHT: 206 LBS | SYSTOLIC BLOOD PRESSURE: 120 MMHG | HEIGHT: 71 IN | BODY MASS INDEX: 28.84 KG/M2 | DIASTOLIC BLOOD PRESSURE: 79 MMHG

## 2019-10-03 DIAGNOSIS — E11.40 TYPE 2 DIABETES MELLITUS WITH DIABETIC NEUROPATHY, UNSPECIFIED: ICD-10-CM

## 2019-10-03 DIAGNOSIS — Z89.421 ACQUIRED ABSENCE OF OTHER RIGHT TOE(S): Chronic | ICD-10-CM

## 2019-10-03 DIAGNOSIS — L97.519 NON-PRESSURE CHRONIC ULCER OF OTHER PART OF RIGHT FOOT WITH UNSPECIFIED SEVERITY: ICD-10-CM

## 2019-10-03 PROCEDURE — 11042 DBRDMT SUBQ TIS 1ST 20SQCM/<: CPT | Mod: RT

## 2019-10-04 NOTE — HISTORY OF PRESENT ILLNESS
[FreeTextEntry1] : Patient is following up for right plantar wound after stepping on a nail. Patient still not using forefoot wedge shoe. Declines total contact cast

## 2019-10-04 NOTE — PHYSICAL EXAM
[General Appearance - Alert] : alert [General Appearance - In No Acute Distress] : in no acute distress [Oriented To Time, Place, And Person] : oriented to person, place, and time [FreeTextEntry1] : right plantar wound  measures 1cm x 1cm. no probe to bone no erythema. no drainage. fibrotic tissue base. approx 0.5cm undermining 10 o'clock

## 2019-10-04 NOTE — ASSESSMENT
[FreeTextEntry1] : \par -ulcer  excisionally debrided of fibrotic/devitalized tissue down to subcutaneous layer. Performed under sterile conditions with curette and scalpel. right foot\par -Dressed with Silvadene and DSD\par -home LWC: daily sanyl and gauze dressing.\par -Rx Cam walker\par -referred to [employer] for fabrication of dispersion inserts with CAM walker\par -return one week\par \par \par

## 2019-10-07 ENCOUNTER — APPOINTMENT (OUTPATIENT)
Dept: VASCULAR SURGERY | Facility: CLINIC | Age: 69
End: 2019-10-07

## 2019-10-28 ENCOUNTER — APPOINTMENT (OUTPATIENT)
Dept: PODIATRY | Facility: CLINIC | Age: 69
End: 2019-10-28

## 2019-11-01 ENCOUNTER — APPOINTMENT (OUTPATIENT)
Dept: PODIATRY | Facility: CLINIC | Age: 69
End: 2019-11-01
Payer: MEDICARE

## 2019-11-01 ENCOUNTER — OUTPATIENT (OUTPATIENT)
Dept: OUTPATIENT SERVICES | Facility: HOSPITAL | Age: 69
LOS: 1 days | Discharge: HOME | End: 2019-11-01

## 2019-11-01 VITALS
SYSTOLIC BLOOD PRESSURE: 166 MMHG | HEIGHT: 71 IN | HEART RATE: 93 BPM | DIASTOLIC BLOOD PRESSURE: 93 MMHG | WEIGHT: 206 LBS | BODY MASS INDEX: 28.84 KG/M2

## 2019-11-01 DIAGNOSIS — Z89.421 ACQUIRED ABSENCE OF OTHER RIGHT TOE(S): Chronic | ICD-10-CM

## 2019-11-01 PROCEDURE — 11042 DBRDMT SUBQ TIS 1ST 20SQCM/<: CPT | Mod: 79,RT

## 2019-11-06 NOTE — END OF VISIT
[] : Resident [FreeTextEntry3] : right plantar wound progressing well \par discussed TCC-->pt declined

## 2019-11-06 NOTE — ASSESSMENT
[FreeTextEntry1] : -ulcer  excisionally debrided of fibrotic/devitalized tissue down to subcutaneous layer. Performed under sterile conditions with curette and scalpel. right foot\par -Dressed with Silvadene and DSD\par -home LWC: daily sanyl and gauze dressing.\par -Wear darco shoe anterior wedge shoe \par -return one week\par \par \par

## 2019-11-06 NOTE — PHYSICAL EXAM
[General Appearance - Alert] : alert [General Appearance - In No Acute Distress] : in no acute distress [Nail Clubbing] : no clubbing  or cyanosis of the fingernails [Oriented To Time, Place, And Person] : oriented to person, place, and time [FreeTextEntry1] : right plantar wound  measures 0.9cm x 0.9 1cm x 0.7 cm. no probe to bone no erythema. no drainage. fibrotic tissue base.

## 2019-11-06 NOTE — HISTORY OF PRESENT ILLNESS
[FreeTextEntry1] : Patient is following up for right plantar wound after stepping on a nail. Patient still not using forefoot wedge shoe. Declines total contact cast. Continues LWC.

## 2019-11-07 ENCOUNTER — OUTPATIENT (OUTPATIENT)
Dept: OUTPATIENT SERVICES | Facility: HOSPITAL | Age: 69
LOS: 1 days | Discharge: HOME | End: 2019-11-07

## 2019-11-07 ENCOUNTER — APPOINTMENT (OUTPATIENT)
Dept: PODIATRY | Facility: CLINIC | Age: 69
End: 2019-11-07
Payer: MEDICARE

## 2019-11-07 VITALS
SYSTOLIC BLOOD PRESSURE: 156 MMHG | BODY MASS INDEX: 28.84 KG/M2 | WEIGHT: 206 LBS | HEIGHT: 71 IN | DIASTOLIC BLOOD PRESSURE: 81 MMHG | HEART RATE: 87 BPM

## 2019-11-07 DIAGNOSIS — Z89.421 ACQUIRED ABSENCE OF OTHER RIGHT TOE(S): Chronic | ICD-10-CM

## 2019-11-07 PROCEDURE — 11042 DBRDMT SUBQ TIS 1ST 20SQCM/<: CPT | Mod: 58

## 2019-11-11 NOTE — PHYSICAL EXAM
[General Appearance - Alert] : alert [General Appearance - In No Acute Distress] : in no acute distress [Nail Clubbing] : no clubbing  or cyanosis of the fingernails [Oriented To Time, Place, And Person] : oriented to person, place, and time [FreeTextEntry1] : Diminished pulses DP and PT R foot

## 2019-11-11 NOTE — ASSESSMENT
[FreeTextEntry1] : -ulcer  excisionally debrided of fibrotic/devitalized tissue down to subcutaneous layer. Performed under sterile conditions with curette and scalpel. right foot\par -Dressed with medihoney and DSD\par -home LWC: daily sanyl and gauze dressing.\par - recommned Wear darco shoe anterior wedge shoe \par -return one week to see [facility] for off loading \par \par \par

## 2019-11-14 ENCOUNTER — OUTPATIENT (OUTPATIENT)
Dept: OUTPATIENT SERVICES | Facility: HOSPITAL | Age: 69
LOS: 1 days | Discharge: HOME | End: 2019-11-14

## 2019-11-14 ENCOUNTER — INPATIENT (INPATIENT)
Facility: HOSPITAL | Age: 69
LOS: 6 days | Discharge: HOME HEALTH | End: 2019-11-21
Attending: INTERNAL MEDICINE | Admitting: INTERNAL MEDICINE
Payer: MEDICARE

## 2019-11-14 ENCOUNTER — APPOINTMENT (OUTPATIENT)
Dept: PODIATRY | Facility: CLINIC | Age: 69
End: 2019-11-14
Payer: MEDICARE

## 2019-11-14 VITALS
TEMPERATURE: 101 F | RESPIRATION RATE: 18 BRPM | SYSTOLIC BLOOD PRESSURE: 196 MMHG | OXYGEN SATURATION: 100 % | HEART RATE: 109 BPM | DIASTOLIC BLOOD PRESSURE: 89 MMHG

## 2019-11-14 DIAGNOSIS — Z89.421 ACQUIRED ABSENCE OF OTHER RIGHT TOE(S): Chronic | ICD-10-CM

## 2019-11-14 LAB
ALBUMIN SERPL ELPH-MCNC: 4 G/DL — SIGNIFICANT CHANGE UP (ref 3.5–5.2)
ALP SERPL-CCNC: 112 U/L — SIGNIFICANT CHANGE UP (ref 30–115)
ALT FLD-CCNC: 44 U/L — HIGH (ref 0–41)
ANION GAP SERPL CALC-SCNC: 15 MMOL/L — HIGH (ref 7–14)
AST SERPL-CCNC: 41 U/L — SIGNIFICANT CHANGE UP (ref 0–41)
BASE EXCESS BLDV CALC-SCNC: 3.8 MMOL/L — HIGH (ref -2–2)
BASOPHILS # BLD AUTO: 0 K/UL — SIGNIFICANT CHANGE UP (ref 0–0.2)
BASOPHILS NFR BLD AUTO: 0 % — SIGNIFICANT CHANGE UP (ref 0–1)
BILIRUB SERPL-MCNC: 0.9 MG/DL — SIGNIFICANT CHANGE UP (ref 0.2–1.2)
BUN SERPL-MCNC: 13 MG/DL — SIGNIFICANT CHANGE UP (ref 10–20)
CA-I SERPL-SCNC: 1.18 MMOL/L — SIGNIFICANT CHANGE UP (ref 1.12–1.3)
CALCIUM SERPL-MCNC: 8.9 MG/DL — SIGNIFICANT CHANGE UP (ref 8.5–10.1)
CHLORIDE SERPL-SCNC: 100 MMOL/L — SIGNIFICANT CHANGE UP (ref 98–110)
CO2 SERPL-SCNC: 24 MMOL/L — SIGNIFICANT CHANGE UP (ref 17–32)
CREAT SERPL-MCNC: 1 MG/DL — SIGNIFICANT CHANGE UP (ref 0.7–1.5)
EOSINOPHIL # BLD AUTO: 0 K/UL — SIGNIFICANT CHANGE UP (ref 0–0.7)
EOSINOPHIL NFR BLD AUTO: 0 % — SIGNIFICANT CHANGE UP (ref 0–8)
ERYTHROCYTE [SEDIMENTATION RATE] IN BLOOD: 49 MM/HR — HIGH (ref 0–10)
GAS PNL BLDV: 143 MMOL/L — SIGNIFICANT CHANGE UP (ref 136–145)
GAS PNL BLDV: SIGNIFICANT CHANGE UP
GIANT PLATELETS BLD QL SMEAR: PRESENT — SIGNIFICANT CHANGE UP
GLUCOSE BLDC GLUCOMTR-MCNC: 133 MG/DL — HIGH (ref 70–99)
GLUCOSE SERPL-MCNC: 189 MG/DL — HIGH (ref 70–99)
HCO3 BLDV-SCNC: 29 MMOL/L — SIGNIFICANT CHANGE UP (ref 22–29)
HCT VFR BLD CALC: 34.3 % — LOW (ref 42–52)
HCT VFR BLDA CALC: 35.8 % — SIGNIFICANT CHANGE UP (ref 34–44)
HGB BLD CALC-MCNC: 11.7 G/DL — LOW (ref 14–18)
HGB BLD-MCNC: 11.3 G/DL — LOW (ref 14–18)
LACTATE BLDV-MCNC: 1.8 MMOL/L — HIGH (ref 0.5–1.6)
LYMPHOCYTES # BLD AUTO: 0.45 K/UL — LOW (ref 1.2–3.4)
LYMPHOCYTES # BLD AUTO: 3.5 % — LOW (ref 20.5–51.1)
MANUAL SMEAR VERIFICATION: SIGNIFICANT CHANGE UP
MCHC RBC-ENTMCNC: 29.2 PG — SIGNIFICANT CHANGE UP (ref 27–31)
MCHC RBC-ENTMCNC: 32.9 G/DL — SIGNIFICANT CHANGE UP (ref 32–37)
MCV RBC AUTO: 88.6 FL — SIGNIFICANT CHANGE UP (ref 80–94)
MONOCYTES # BLD AUTO: 0.89 K/UL — HIGH (ref 0.1–0.6)
MONOCYTES NFR BLD AUTO: 6.9 % — SIGNIFICANT CHANGE UP (ref 1.7–9.3)
NEUTROPHILS # BLD AUTO: 11.43 K/UL — HIGH (ref 1.4–6.5)
NEUTROPHILS NFR BLD AUTO: 88.7 % — HIGH (ref 42.2–75.2)
PCO2 BLDV: 47 MMHG — SIGNIFICANT CHANGE UP (ref 41–51)
PH BLDV: 7.4 — SIGNIFICANT CHANGE UP (ref 7.26–7.43)
PLAT MORPH BLD: NORMAL — SIGNIFICANT CHANGE UP
PLATELET # BLD AUTO: 242 K/UL — SIGNIFICANT CHANGE UP (ref 130–400)
PO2 BLDV: 26 MMHG — SIGNIFICANT CHANGE UP (ref 20–40)
POIKILOCYTOSIS BLD QL AUTO: SLIGHT — SIGNIFICANT CHANGE UP
POTASSIUM BLDV-SCNC: 4 MMOL/L — SIGNIFICANT CHANGE UP (ref 3.3–5.6)
POTASSIUM SERPL-MCNC: 4.5 MMOL/L — SIGNIFICANT CHANGE UP (ref 3.5–5)
POTASSIUM SERPL-SCNC: 4.5 MMOL/L — SIGNIFICANT CHANGE UP (ref 3.5–5)
PROT SERPL-MCNC: 6.8 G/DL — SIGNIFICANT CHANGE UP (ref 6–8)
RBC # BLD: 3.87 M/UL — LOW (ref 4.7–6.1)
RBC # FLD: 13 % — SIGNIFICANT CHANGE UP (ref 11.5–14.5)
RBC BLD AUTO: ABNORMAL
SAO2 % BLDV: 44 % — SIGNIFICANT CHANGE UP
SODIUM SERPL-SCNC: 139 MMOL/L — SIGNIFICANT CHANGE UP (ref 135–146)
VARIANT LYMPHS # BLD: 0.9 % — SIGNIFICANT CHANGE UP (ref 0–5)
WBC # BLD: 12.89 K/UL — HIGH (ref 4.8–10.8)
WBC # FLD AUTO: 12.89 K/UL — HIGH (ref 4.8–10.8)

## 2019-11-14 PROCEDURE — 99024 POSTOP FOLLOW-UP VISIT: CPT

## 2019-11-14 PROCEDURE — 99285 EMERGENCY DEPT VISIT HI MDM: CPT

## 2019-11-14 PROCEDURE — 71045 X-RAY EXAM CHEST 1 VIEW: CPT | Mod: 26

## 2019-11-14 RX ORDER — LISINOPRIL 2.5 MG/1
40 TABLET ORAL DAILY
Refills: 0 | Status: DISCONTINUED | OUTPATIENT
Start: 2019-11-14 | End: 2019-11-20

## 2019-11-14 RX ORDER — VANCOMYCIN HCL 1 G
1250 VIAL (EA) INTRAVENOUS EVERY 12 HOURS
Refills: 0 | Status: DISCONTINUED | OUTPATIENT
Start: 2019-11-14 | End: 2019-11-17

## 2019-11-14 RX ORDER — SODIUM CHLORIDE 9 MG/ML
3000 INJECTION, SOLUTION INTRAVENOUS ONCE
Refills: 0 | Status: COMPLETED | OUTPATIENT
Start: 2019-11-14 | End: 2019-11-14

## 2019-11-14 RX ORDER — ASPIRIN/CALCIUM CARB/MAGNESIUM 324 MG
81 TABLET ORAL DAILY
Refills: 0 | Status: DISCONTINUED | OUTPATIENT
Start: 2019-11-14 | End: 2019-11-18

## 2019-11-14 RX ORDER — SERTRALINE 25 MG/1
50 TABLET, FILM COATED ORAL DAILY
Refills: 0 | Status: DISCONTINUED | OUTPATIENT
Start: 2019-11-14 | End: 2019-11-20

## 2019-11-14 RX ORDER — DEXTROSE 50 % IN WATER 50 %
15 SYRINGE (ML) INTRAVENOUS ONCE
Refills: 0 | Status: DISCONTINUED | OUTPATIENT
Start: 2019-11-14 | End: 2019-11-20

## 2019-11-14 RX ORDER — INSULIN GLARGINE 100 [IU]/ML
45 INJECTION, SOLUTION SUBCUTANEOUS AT BEDTIME
Refills: 0 | Status: DISCONTINUED | OUTPATIENT
Start: 2019-11-14 | End: 2019-11-15

## 2019-11-14 RX ORDER — INSULIN LISPRO 100/ML
15 VIAL (ML) SUBCUTANEOUS
Refills: 0 | Status: DISCONTINUED | OUTPATIENT
Start: 2019-11-14 | End: 2019-11-15

## 2019-11-14 RX ORDER — PANTOPRAZOLE SODIUM 20 MG/1
40 TABLET, DELAYED RELEASE ORAL
Refills: 0 | Status: DISCONTINUED | OUTPATIENT
Start: 2019-11-14 | End: 2019-11-20

## 2019-11-14 RX ORDER — NIFEDIPINE 30 MG
60 TABLET, EXTENDED RELEASE 24 HR ORAL DAILY
Refills: 0 | Status: DISCONTINUED | OUTPATIENT
Start: 2019-11-14 | End: 2019-11-20

## 2019-11-14 RX ORDER — CHLORHEXIDINE GLUCONATE 213 G/1000ML
1 SOLUTION TOPICAL
Refills: 0 | Status: DISCONTINUED | OUTPATIENT
Start: 2019-11-14 | End: 2019-11-20

## 2019-11-14 RX ORDER — ENOXAPARIN SODIUM 100 MG/ML
40 INJECTION SUBCUTANEOUS DAILY
Refills: 0 | Status: DISCONTINUED | OUTPATIENT
Start: 2019-11-14 | End: 2019-11-19

## 2019-11-14 RX ORDER — ATORVASTATIN CALCIUM 80 MG/1
20 TABLET, FILM COATED ORAL AT BEDTIME
Refills: 0 | Status: DISCONTINUED | OUTPATIENT
Start: 2019-11-14 | End: 2019-11-20

## 2019-11-14 RX ORDER — DEXTROSE 50 % IN WATER 50 %
25 SYRINGE (ML) INTRAVENOUS ONCE
Refills: 0 | Status: DISCONTINUED | OUTPATIENT
Start: 2019-11-14 | End: 2019-11-20

## 2019-11-14 RX ORDER — DEXTROSE 50 % IN WATER 50 %
12.5 SYRINGE (ML) INTRAVENOUS ONCE
Refills: 0 | Status: DISCONTINUED | OUTPATIENT
Start: 2019-11-14 | End: 2019-11-20

## 2019-11-14 RX ORDER — CEFEPIME 1 G/1
2000 INJECTION, POWDER, FOR SOLUTION INTRAMUSCULAR; INTRAVENOUS EVERY 8 HOURS
Refills: 0 | Status: DISCONTINUED | OUTPATIENT
Start: 2019-11-14 | End: 2019-11-19

## 2019-11-14 RX ORDER — PIPERACILLIN AND TAZOBACTAM 4; .5 G/20ML; G/20ML
3.38 INJECTION, POWDER, LYOPHILIZED, FOR SOLUTION INTRAVENOUS ONCE
Refills: 0 | Status: COMPLETED | OUTPATIENT
Start: 2019-11-14 | End: 2019-11-14

## 2019-11-14 RX ORDER — INSULIN LISPRO 100/ML
VIAL (ML) SUBCUTANEOUS
Refills: 0 | Status: DISCONTINUED | OUTPATIENT
Start: 2019-11-14 | End: 2019-11-20

## 2019-11-14 RX ORDER — VANCOMYCIN HCL 1 G
1500 VIAL (EA) INTRAVENOUS ONCE
Refills: 0 | Status: COMPLETED | OUTPATIENT
Start: 2019-11-14 | End: 2019-11-14

## 2019-11-14 RX ORDER — GLUCAGON INJECTION, SOLUTION 0.5 MG/.1ML
1 INJECTION, SOLUTION SUBCUTANEOUS ONCE
Refills: 0 | Status: DISCONTINUED | OUTPATIENT
Start: 2019-11-14 | End: 2019-11-20

## 2019-11-14 RX ORDER — SODIUM CHLORIDE 9 MG/ML
1000 INJECTION, SOLUTION INTRAVENOUS
Refills: 0 | Status: DISCONTINUED | OUTPATIENT
Start: 2019-11-14 | End: 2019-11-20

## 2019-11-14 RX ADMIN — ENOXAPARIN SODIUM 40 MILLIGRAM(S): 100 INJECTION SUBCUTANEOUS at 22:29

## 2019-11-14 RX ADMIN — CEFEPIME 100 MILLIGRAM(S): 1 INJECTION, POWDER, FOR SOLUTION INTRAMUSCULAR; INTRAVENOUS at 23:12

## 2019-11-14 RX ADMIN — LISINOPRIL 40 MILLIGRAM(S): 2.5 TABLET ORAL at 22:29

## 2019-11-14 RX ADMIN — Medication 60 MILLIGRAM(S): at 22:28

## 2019-11-14 RX ADMIN — PIPERACILLIN AND TAZOBACTAM 200 GRAM(S): 4; .5 INJECTION, POWDER, LYOPHILIZED, FOR SOLUTION INTRAVENOUS at 17:44

## 2019-11-14 RX ADMIN — Medication 300 MILLIGRAM(S): at 19:02

## 2019-11-14 RX ADMIN — ATORVASTATIN CALCIUM 20 MILLIGRAM(S): 80 TABLET, FILM COATED ORAL at 22:28

## 2019-11-14 RX ADMIN — SODIUM CHLORIDE 3000 MILLILITER(S): 9 INJECTION, SOLUTION INTRAVENOUS at 17:43

## 2019-11-14 RX ADMIN — INSULIN GLARGINE 45 UNIT(S): 100 INJECTION, SOLUTION SUBCUTANEOUS at 23:31

## 2019-11-14 NOTE — CONSULT NOTE ADULT - SUBJECTIVE AND OBJECTIVE BOX
Podiatry Consult Note    Subjective:   JOSÉ MANUEL PORTER is a pleasant well-nourished, well developed 69y Male in no acute distress, alert awake, and oriented to person, place and time.   Patient is a 69y old  Male who presents with a chief complaint of DFU Right Plantar Foot. Patient says he first noticed it a few weeks ago and subsequently got worse over the next few days w/ Swelling, drainage and redness.   Patient subsequently went to see Dr. Bhatia in his clinic who later sent him to the ED. Currently patient denies F/N/V and shortness of breath    Past Medical History and Surgical History  GERD (gastroesophageal reflux disease)  Depression  Diabetic foot ulcer  Dyslipidemia  HTN (hypertension)  DM (diabetes mellitus): 12/27/18 hgb aic  11.2  Toe amputation status, right: (2008)     Review of Systems:   Constitutional: No weakness, fevers or chills  Eyes / ENT: No visual changes; No vertigo or throat pain   Neck: No pain or stiffness  Respiratory: No cough, wheezing, hemoptysis; No shortness of breath  Cardiovascular: No chest pain or palpitations  Gastrointestinal: No abdominal or epigastric pain. No nausea, vomiting, or hematemesis; No diarrhea or constipation. No melena or hematochezia.  Genitourinary: No dysuria, frequency or hematuria  Neurological: No numbness or weakness  Skin: Pressure Ulcer Right Foot     Objective:  Vital Signs Last 24 Hrs  T(C): 35.7 (14 Nov 2019 17:30), Max: 38.2 (14 Nov 2019 16:14)  T(F): 96.2 (14 Nov 2019 17:30), Max: 100.7 (14 Nov 2019 16:14)  HR: 96 (14 Nov 2019 17:30) (96 - 109)  BP: 187/84 (14 Nov 2019 17:30) (187/84 - 196/89)  BP(mean): --  RR: 18 (14 Nov 2019 17:30) (18 - 18)  SpO2: 99% (14 Nov 2019 17:30) (99% - 100%)                        11.3   12.89 )-----------( 242      ( 14 Nov 2019 17:03 )             34.3                 11-14    139  |  100  |  13  ----------------------------<  189<H>  4.5   |  24  |  1.0    Ca    8.9      14 Nov 2019 17:03    TPro  6.8  /  Alb  4.0  /  TBili  0.9  /  DBili  x   /  AST  41  /  ALT  44<H>  /  AlkPhos  112  11-14    ___________________________________________________________________________    EXAM:  XR FOOT COMP MIN 3 VIEWS RT          PROCEDURE DATE:  09/12/2019      INTERPRETATION:  CLINICAL HISTORY: Puncture wound. Pain.    COMPARISON: 1/14/2014.    TECHNIQUE: 3 views of the right foot.    FINDINGS:    Soft tissue irregularity along the forefoot in the region of the MTP   joints consistent with given history of wound. No definite radiographic   evidence for osteomyelitis.    No acute fracture or dislocation. Post amputation of the first digit to   the distal metatarsal with adjacent clips, unchanged. Degenerative change   in the remaining MTP joints, midfoot, and talonavicular joint. Calcaneal   heel spur and Achilles enthesophyte. Second and third hammertoe   deformities.    Vascular calcifications.    IMPRESSION:    1. Soft tissue irregularity in the forefoot consistent with given history   of open wound. No radiographic evidence of osteomyelitis.    2. Chronic/degenerative change as above.    MARIA A PRECIADO M.D., ATTENDING RADIOLOGIST  This document has been electronically signed. Sep 13 2019 10:09AM  ___________________________________________________________________________    Physical Exam - Lower Extremity Focused:   Derm:   Plantar Ulcer Right Foot Between Submet 2 and 3  Macerated Periwound w/ Fibrogranular Tissue   Wound probes Deep to Capsule and Possibly Bone  Mild Erythema and Edema w/ Drainage w/ Mild Odor    Vascular:   Non-Palpable Pulses on the Right Foot   Foot was warm to warm to the touch   Capillary Refill < 3 Seconds     Neuro: Protective Sensation Mildly Intact     Assessment:   Right Foot Pressure Ulcer w/ Fibrogranular Tissue   Pressure Ulcer Probes to Capsule / Bone  Diabetic    Plan:  Chart reviewed and Patient evaluated. All questions and concerns were addressed and answered   Discussed diagnosis and treatment with patient  Wound Flushed w/ Peroxide and Irrigated w/ Normal Saline; Applied Betadine Soaked Gauze / DSD / Kerlix   Obtained Wound Culture; Sent to Pathology; Pending Results  XR Imaging; No Radiographic Evidence of Osteomyelitis; See Report Above  Cont IV Abx; As Per ED  Request ID Consult; WBC: 12.89 / ESR 49   Attending to Follow Up Tomorrow 11/15  Discussed w/ Dr. Bhatia Podiatry Consult Note    Subjective:   JOSÉ MANUEL PORTER is a pleasant well-nourished, well developed 69y Male in no acute distress, alert awake, and oriented to person, place and time.   Patient is a 69y old  Male who presents with a chief complaint of DFU Right Plantar Foot. Patient says he first noticed it a few weeks ago and subsequently got worse over the next few days w/ Swelling, drainage and redness.   Patient subsequently went to see Dr. Bhatia in his clinic who later sent him to the ED. Currently patient denies F/N/V and shortness of breath    Past Medical History and Surgical History  GERD (gastroesophageal reflux disease)  Depression  Diabetic foot ulcer  Dyslipidemia  HTN (hypertension)  DM (diabetes mellitus): 12/27/18 hgb aic  11.2  Toe amputation status, right: (2008)     Review of Systems:   Constitutional: No weakness, fevers or chills  Eyes / ENT: No visual changes; No vertigo or throat pain   Neck: No pain or stiffness  Respiratory: No cough, wheezing, hemoptysis; No shortness of breath  Cardiovascular: No chest pain or palpitations  Gastrointestinal: No abdominal or epigastric pain. No nausea, vomiting, or hematemesis; No diarrhea or constipation. No melena or hematochezia.  Genitourinary: No dysuria, frequency or hematuria  Neurological: No numbness or weakness  Skin: Pressure Ulcer Right Foot     Objective:  Vital Signs Last 24 Hrs  T(C): 35.7 (14 Nov 2019 17:30), Max: 38.2 (14 Nov 2019 16:14)  T(F): 96.2 (14 Nov 2019 17:30), Max: 100.7 (14 Nov 2019 16:14)  HR: 96 (14 Nov 2019 17:30) (96 - 109)  BP: 187/84 (14 Nov 2019 17:30) (187/84 - 196/89)  BP(mean): --  RR: 18 (14 Nov 2019 17:30) (18 - 18)  SpO2: 99% (14 Nov 2019 17:30) (99% - 100%)                        11.3   12.89 )-----------( 242      ( 14 Nov 2019 17:03 )             34.3                 11-14    139  |  100  |  13  ----------------------------<  189<H>  4.5   |  24  |  1.0    Ca    8.9      14 Nov 2019 17:03    TPro  6.8  /  Alb  4.0  /  TBili  0.9  /  DBili  x   /  AST  41  /  ALT  44<H>  /  AlkPhos  112  11-14    ___________________________________________________________________________      Physical Exam - Lower Extremity Focused:   Derm:   Plantar Ulcer Right Foot Between Submet 2 and 3  Macerated Periwound w/ Fibrogranular Tissue   Wound probes Deep to Capsule and Possibly Bone  Mild Erythema and Edema w/ Drainage w/ Mild Odor    Vascular:   Non-Palpable Pulses on the Right Foot   Foot was warm to warm to the touch   Capillary Refill < 3 Seconds     Neuro: Protective Sensation Mildly Intact     Assessment:   Right Foot Pressure Ulcer w/ Fibrogranular Tissue   Pressure Ulcer Probes to Capsule / Bone  Diabetic    Plan:  Chart reviewed and Patient evaluated. All questions and concerns were addressed and answered   Discussed diagnosis and treatment with patient  Wound Flushed w/ Peroxide and Irrigated w/ Normal Saline; Applied Betadine Soaked Gauze / DSD / Kerlix   Obtained Wound Culture; Sent to Pathology; Pending Results  Cont IV Abx; As Per ED  Request ID Consult; WBC: 12.89 / ESR 49   Attending to Follow Up Tomorrow 11/15

## 2019-11-14 NOTE — ED PROVIDER NOTE - OBJECTIVE STATEMENT
pt is a 68 y/o male with hx of DM, HTN, GERD, recurrent DFU's presents to ED for pain, swelling, redness to right foot that got worse this week, moderate, constant, no change since onset. Pt was sent in by podiatry for admission. + fever today.

## 2019-11-14 NOTE — ED PROVIDER NOTE - PHYSICAL EXAMINATION
Constitutional: Well developed, well nourished. NAD.  Head: Normocephalic, atraumatic.  Eyes: PERRL. EOMI.  ENT: No nasal discharge. Mucous membranes moist.  Neck: Supple. Painless ROM.  Cardiovascular: Normal S1, S2. Regular rate and rhythm. No murmurs, rubs, or gallops.  Pulmonary: Normal respiratory rate and effort. Lungs clear to auscultation bilaterally. No wheezing, rales, or rhonchi.  Abdominal: Soft. Nondistended. Nontender. No rebound, guarding, rigidity.  Extremities. Pelvis stable. No lower extremity edema, symmetric calves. + right foot DFU with infection.  Skin: No rashes, cyanosis.  Neuro: AAOx3. No focal neurological deficits.  Psych: Normal mood. Normal affect.

## 2019-11-14 NOTE — ED PROVIDER NOTE - CLINICAL SUMMARY MEDICAL DECISION MAKING FREE TEXT BOX
70 y/o male with hx of DM, presented to ED for infected DFU, sent in by podiatry. Sepsis labs, fluids, abx obtained/administered. Podiatry paged. Will admit for further inpatient medical management.

## 2019-11-14 NOTE — ED PROVIDER NOTE - NS ED ROS FT
Constitutional: + fever, chills.  Eyes: No visual changes.  ENT: No hearing changes. No sore throat.  Neck: No neck pain or stiffness.  Cardiovascular: No chest pain, palpitations, edema.  Pulmonary: No SOB, cough. No hemoptysis.  Abdominal: No abdominal pain, nausea, vomiting, diarrhea.  : No dysuria, frequency.  Neuro: No headache, syncope, dizziness.  MS: No back pain. No calf pain/swelling. + right foot pain, ulcer  Psych: No suicidal ideations.

## 2019-11-14 NOTE — H&P ADULT - NSHPPHYSICALEXAM_GEN_ALL_CORE
PHYSICAL EXAM:  GENERAL: NAD, lying in bed comfortably  HEAD:  Atraumatic, Normocephalic  EYES: EOMI, PERRLA, conjunctiva and sclera clear  ENT: Moist mucous membranes  NECK: Supple, No JVD  CHEST/LUNG: Clear to auscultation bilaterally; No rales, rhonchi, wheezing, or rubs. Unlabored respirations  HEART: Regular rate and rhythm; No murmurs, rubs, or gallops  ABDOMEN: Bowel sounds present; Soft, Nontender, Nondistended. No hepatomegally  EXTREMITIES:  2+ Peripheral Pulses, brisk capillary refill. No clubbing, cyanosis, or edema  NERVOUS SYSTEM:  Alert & Oriented X3, speech clear. No deficits   MSK: FROM all 4 extremities, full and equal strength  SKIN: No rashes or lesions GENERAL: NAD, lying in bed comfortably  CHEST/LUNG: Clear to auscultation bilaterally; No rales, rhonchi, wheezing, or rubs.   HEART: Regular rate and rhythm; No murmurs, rubs, or gallops  ABDOMEN: Bowel sounds present; Soft, Nontender, Nondistended.   EXTREMITIES:  2+ Peripheral Pulses, rt foot big toe amputation, plantar surface medial ulcer with minimal discharge.   NERVOUS SYSTEM:  Alert & Oriented X3, speech clear. No deficits

## 2019-11-14 NOTE — H&P ADULT - NSHPLABSRESULTS_GEN_ALL_CORE
Complete Blood Count + Automated Diff (11.14.19 @ 17:03)    WBC Count: 12.89 K/uL    RBC Count: 3.87 M/uL    Hemoglobin: 11.3 g/dL    Hematocrit: 34.3 %    Mean Cell Volume: 88.6 fL    Mean Cell Hemoglobin: 29.2 pg    Mean Cell Hemoglobin Conc: 32.9 g/dL    Red Cell Distrib Width: 13.0 %    Platelet Count - Automated: 242 K/uL    Auto Neutrophil #: 11.43 K/uL    Auto Lymphocyte #: 0.45 K/uL    Auto Monocyte #: 0.89 K/uL    Auto Eosinophil #: 0.00 K/uL    Auto Basophil #: 0.00 K/uL    Auto Neutrophil %: 88.7: Differential percentages must be correlated with absolute numbers for  clinical significance. %    Auto Lymphocyte %: 3.5 %    Auto Monocyte %: 6.9 %    Auto Eosinophil %: 0.0 %    Auto Basophil %: 0.0 %    Comprehensive Metabolic Panel (11.14.19 @ 17:03)    Sodium, Serum: 139 mmol/L    Potassium, Serum: 4.5: Hemolyzed. Interpret with caution mmol/L    Chloride, Serum: 100 mmol/L    Carbon Dioxide, Serum: 24 mmol/L    Anion Gap, Serum: 15 mmol/L    Blood Urea Nitrogen, Serum: 13 mg/dL    Creatinine, Serum: 1.0 mg/dL    Glucose, Serum: 189 mg/dL    Calcium, Total Serum: 8.9 mg/dL    Protein Total, Serum: 6.8 g/dL    Albumin, Serum: 4.0 g/dL    Bilirubin Total, Serum: 0.9 mg/dL    Alkaline Phosphatase, Serum: 112 U/L    Aspartate Aminotransferase (AST/SGOT): 41: Hemolyzed. Interpret with caution U/L    Alanine Aminotransferase (ALT/SGPT): 44: Hemolyzed. Interpret with caution U/L    eGFR if Non : 75: Interpretative comment  The units for eGFR are mL/min/1.73M2 (normalized body surface area). The  eGFR is calculated from a serum creatinine using the CKD-EPI equation.  Other variables required for calculation are race, age and sex. Among  patients with chronic kidney disease (CKD), the eGFR is useful in  determining the stage of disease according to KDOQI CKD classification.  All eGFR results are reported numerically with the following  interpretation.          GFR                    With                 Without     (ml/min/1.73 m2)    Kidney Damage       Kidney Damage        >= 90                    Stage 1                     Normal        60-89                    Stage 2                     Decreased GFR        30-59     Stage 3                     Stage 3        15-29                    Stage 4                     Stage 4        < 15                      Stage 5                     Stage 5  Each stage of CKD assumes that the associated GFR level has been in  effect for at least 3 months. Determination of stages one and two (with  eGFR > 59 ml/min/m2) requires estimation of kidney damage for at least 3  months as defined by structural or functional abnormalities.  Limitations: All estimates of GFR will be less accurate for patients at  extremes of muscle mass (including but not limited to frail elderly,  critically ill, or cancer patients), those with unusual diets, and those  with conditions associated with reduced secretion or extrarenal  elimination of creatinine. The eGFR equation is not recommended for use  in patients with unstable creatinine levels. mL/min/1.73M2    eGFR if African American: 87 mL/min/1.73M2    < from: Xray Foot AP + Lateral + Oblique, Right (09.12.19 @ 16:29) >    IMPRESSION:    1. Soft tissue irregularity in the forefoot consistent with given history   of open wound. No radiographic evidence of osteomyelitis.    2. Chronic/degenerative change as above.    < end of copied text >

## 2019-11-14 NOTE — H&P ADULT - HISTORY OF PRESENT ILLNESS
70yo M with PMH of insulin-dependent diabetes mellitus , Diabetic foot ulcer s/p R great toe amputation and Partial Hallux Amputation Left Foot was sent to the ED from podiatry office Dr. Bhatia for worsening right lower ext diabetic foot ulcer.        In ED pt had tachycardia 109, febrile Tmax 100.7, /89.  patient received 3 L LR , zosyn and vanco. 68 yo M with PMH of insulin-dependent diabetes mellitus , Diabetic foot ulcer s/p R great toe amputation and Partial Hallux Amputation Left Foot was sent to the ED from podiatry office Dr. Bhatia for worsening right lower ext diabetic foot ulcer.        In ED pt had tachycardia 109, febrile Tmax 100.7, /89.  patient received 3 L LR , zosyn and vanco. 68 yo M with PMH of insulin-dependent diabetes mellitus , Diabetic foot ulcer s/p R great toe amputation and Partial Hallux Amputation Left Foot was sent to the ED from podiatry office Dr. Bhatia for worsening right lower ext diabetic foot ulcer. patient reports he had pain and feeling warm for only 1 day. he went to his podiatrist for follow up who upon evaluation referred the patient to the ED. patient also reports he stepped over a screw 3 weeks ago. denies discharge, fever , chills, nausea, vomiting or change in bowel or urinary habits.     In ED pt had tachycardia 109, febrile Tmax 100.7, /89.  patient received 3 L LR , zosyn and vanco. 70 yo M with PMH of insulin-dependent diabetes mellitus , Diabetic foot ulcer s/p R great toe amputation and Partial Hallux Amputation Left Foot was sent to the ED from podiatry office Dr. Bhatia for worsening right lower ext diabetic foot ulcer. patient reports he had pain and feeling warm for only 1 day. he went to his podiatrist for follow up who upon evaluation referred the patient to the ED. patient also reports he stepped over a screw 3 weeks ago. denies discharge, fever , chills, nausea, vomiting or change in bowel or urinary habits.     In ED pt had tachycardia 109, febrile Tmax 100.7, /89.  patient received 3 L LR , zosyn and vanco.     Attd: here for eval of rt foot ulcer, which is getting larger in size; pt developed this after stepping on screw 3 wks ago (got Td vac); pt says rt foot and lower leg were very hot; he developed fever; his pod sent him in for IV abx and further eval for sx; pt is otherwise fine; no CP or SOB

## 2019-11-14 NOTE — HISTORY OF PRESENT ILLNESS
[FreeTextEntry1] : Patient is following up for right plantar wound after stepping on a nail. Patient still not using forefoot wedge shoe. Declines total contact cast. Continues LWC. still not using ortho wedge

## 2019-11-14 NOTE — ED ADULT TRIAGE NOTE - CHIEF COMPLAINT QUOTE
right foot swelling, possible infection, sent in by MD. pt states, "I stepped on nail one month ago"

## 2019-11-14 NOTE — ED ADULT NURSE NOTE - OBJECTIVE STATEMENT
pt alert and oriented x4 complaining of R foot swelling. states he stepped on a nail 1 month ago. sent by podiatrist. denies nausea, vomiting, diarrhea.

## 2019-11-14 NOTE — CONSULT NOTE ADULT - ATTENDING COMMENTS
patient seen on 11/15    xrays ordered--> f/u  will follow up with recs after xrays-->patient wishes to avoid surgery  will discuss with ID-->6 weeks of antibiotics, total contact cast and hyperbaric therapy? vs surgical resection

## 2019-11-14 NOTE — PHYSICAL EXAM
[General Appearance - Alert] : alert [General Appearance - In No Acute Distress] : in no acute distress [Nail Clubbing] : no clubbing  or cyanosis of the fingernails [Oriented To Time, Place, And Person] : oriented to person, place, and time [FreeTextEntry1] : right plantar wound  measures probes to capsule, maloderous, right foot warm to touch, +edema

## 2019-11-15 DIAGNOSIS — Z02.9 ENCOUNTER FOR ADMINISTRATIVE EXAMINATIONS, UNSPECIFIED: ICD-10-CM

## 2019-11-15 LAB
ANION GAP SERPL CALC-SCNC: 13 MMOL/L — SIGNIFICANT CHANGE UP (ref 7–14)
APPEARANCE UR: ABNORMAL
BACTERIA # UR AUTO: NEGATIVE — SIGNIFICANT CHANGE UP
BASOPHILS # BLD AUTO: 0.01 K/UL — SIGNIFICANT CHANGE UP (ref 0–0.2)
BASOPHILS NFR BLD AUTO: 0.1 % — SIGNIFICANT CHANGE UP (ref 0–1)
BILIRUB UR-MCNC: NEGATIVE — SIGNIFICANT CHANGE UP
BUN SERPL-MCNC: 11 MG/DL — SIGNIFICANT CHANGE UP (ref 10–20)
CALCIUM SERPL-MCNC: 8.7 MG/DL — SIGNIFICANT CHANGE UP (ref 8.5–10.1)
CHLORIDE SERPL-SCNC: 100 MMOL/L — SIGNIFICANT CHANGE UP (ref 98–110)
CO2 SERPL-SCNC: 25 MMOL/L — SIGNIFICANT CHANGE UP (ref 17–32)
COLOR SPEC: YELLOW — SIGNIFICANT CHANGE UP
CREAT SERPL-MCNC: 1 MG/DL — SIGNIFICANT CHANGE UP (ref 0.7–1.5)
CRP SERPL-MCNC: 15.52 MG/DL — HIGH (ref 0–0.4)
DIFF PNL FLD: NEGATIVE — SIGNIFICANT CHANGE UP
EOSINOPHIL # BLD AUTO: 0.13 K/UL — SIGNIFICANT CHANGE UP (ref 0–0.7)
EOSINOPHIL NFR BLD AUTO: 1.2 % — SIGNIFICANT CHANGE UP (ref 0–8)
EPI CELLS # UR: 1 /HPF — SIGNIFICANT CHANGE UP (ref 0–5)
ESTIMATED AVERAGE GLUCOSE: 223 MG/DL — HIGH (ref 68–114)
GLUCOSE BLDC GLUCOMTR-MCNC: 131 MG/DL — HIGH (ref 70–99)
GLUCOSE BLDC GLUCOMTR-MCNC: 179 MG/DL — HIGH (ref 70–99)
GLUCOSE BLDC GLUCOMTR-MCNC: 221 MG/DL — HIGH (ref 70–99)
GLUCOSE BLDC GLUCOMTR-MCNC: 252 MG/DL — HIGH (ref 70–99)
GLUCOSE BLDC GLUCOMTR-MCNC: 65 MG/DL — LOW (ref 70–99)
GLUCOSE SERPL-MCNC: 252 MG/DL — HIGH (ref 70–99)
GLUCOSE UR QL: ABNORMAL
HBA1C BLD-MCNC: 9.4 % — HIGH (ref 4–5.6)
HCT VFR BLD CALC: 32.2 % — LOW (ref 42–52)
HCV AB S/CO SERPL IA: 0.06 S/CO — SIGNIFICANT CHANGE UP (ref 0–0.99)
HCV AB SERPL-IMP: SIGNIFICANT CHANGE UP
HGB BLD-MCNC: 10.4 G/DL — LOW (ref 14–18)
HYALINE CASTS # UR AUTO: 3 /LPF — SIGNIFICANT CHANGE UP (ref 0–7)
IMM GRANULOCYTES NFR BLD AUTO: 0.6 % — HIGH (ref 0.1–0.3)
KETONES UR-MCNC: SIGNIFICANT CHANGE UP
LEUKOCYTE ESTERASE UR-ACNC: NEGATIVE — SIGNIFICANT CHANGE UP
LYMPHOCYTES # BLD AUTO: 0.95 K/UL — LOW (ref 1.2–3.4)
LYMPHOCYTES # BLD AUTO: 8.8 % — LOW (ref 20.5–51.1)
MAGNESIUM SERPL-MCNC: 1.5 MG/DL — LOW (ref 1.8–2.4)
MCHC RBC-ENTMCNC: 28.7 PG — SIGNIFICANT CHANGE UP (ref 27–31)
MCHC RBC-ENTMCNC: 32.3 G/DL — SIGNIFICANT CHANGE UP (ref 32–37)
MCV RBC AUTO: 88.7 FL — SIGNIFICANT CHANGE UP (ref 80–94)
MONOCYTES # BLD AUTO: 1.09 K/UL — HIGH (ref 0.1–0.6)
MONOCYTES NFR BLD AUTO: 10.1 % — HIGH (ref 1.7–9.3)
NEUTROPHILS # BLD AUTO: 8.6 K/UL — HIGH (ref 1.4–6.5)
NEUTROPHILS NFR BLD AUTO: 79.2 % — HIGH (ref 42.2–75.2)
NITRITE UR-MCNC: NEGATIVE — SIGNIFICANT CHANGE UP
NRBC # BLD: 0 /100 WBCS — SIGNIFICANT CHANGE UP (ref 0–0)
PH UR: 6 — SIGNIFICANT CHANGE UP (ref 5–8)
PLATELET # BLD AUTO: 204 K/UL — SIGNIFICANT CHANGE UP (ref 130–400)
POTASSIUM SERPL-MCNC: 3.8 MMOL/L — SIGNIFICANT CHANGE UP (ref 3.5–5)
POTASSIUM SERPL-SCNC: 3.8 MMOL/L — SIGNIFICANT CHANGE UP (ref 3.5–5)
PROT UR-MCNC: ABNORMAL
RBC # BLD: 3.63 M/UL — LOW (ref 4.7–6.1)
RBC # FLD: 12.9 % — SIGNIFICANT CHANGE UP (ref 11.5–14.5)
RBC CASTS # UR COMP ASSIST: 3 /HPF — SIGNIFICANT CHANGE UP (ref 0–4)
SODIUM SERPL-SCNC: 138 MMOL/L — SIGNIFICANT CHANGE UP (ref 135–146)
SP GR SPEC: 1.02 — SIGNIFICANT CHANGE UP (ref 1.01–1.02)
UROBILINOGEN FLD QL: SIGNIFICANT CHANGE UP
WBC # BLD: 10.84 K/UL — HIGH (ref 4.8–10.8)
WBC # FLD AUTO: 10.84 K/UL — HIGH (ref 4.8–10.8)
WBC UR QL: 3 /HPF — SIGNIFICANT CHANGE UP (ref 0–5)

## 2019-11-15 PROCEDURE — 73719 MRI LOWER EXTREMITY W/DYE: CPT | Mod: 26,RT

## 2019-11-15 PROCEDURE — 93925 LOWER EXTREMITY STUDY: CPT | Mod: 26

## 2019-11-15 PROCEDURE — 99222 1ST HOSP IP/OBS MODERATE 55: CPT | Mod: 24

## 2019-11-15 PROCEDURE — 99223 1ST HOSP IP/OBS HIGH 75: CPT | Mod: AI

## 2019-11-15 PROCEDURE — 73630 X-RAY EXAM OF FOOT: CPT | Mod: 26,RT

## 2019-11-15 RX ORDER — INSULIN LISPRO 100/ML
12 VIAL (ML) SUBCUTANEOUS
Refills: 0 | Status: DISCONTINUED | OUTPATIENT
Start: 2019-11-15 | End: 2019-11-16

## 2019-11-15 RX ORDER — METRONIDAZOLE 500 MG
500 TABLET ORAL ONCE
Refills: 0 | Status: COMPLETED | OUTPATIENT
Start: 2019-11-15 | End: 2019-11-15

## 2019-11-15 RX ORDER — METRONIDAZOLE 500 MG
500 TABLET ORAL EVERY 8 HOURS
Refills: 0 | Status: DISCONTINUED | OUTPATIENT
Start: 2019-11-15 | End: 2019-11-18

## 2019-11-15 RX ORDER — METRONIDAZOLE 500 MG
TABLET ORAL
Refills: 0 | Status: DISCONTINUED | OUTPATIENT
Start: 2019-11-15 | End: 2019-11-18

## 2019-11-15 RX ORDER — MAGNESIUM OXIDE 400 MG ORAL TABLET 241.3 MG
400 TABLET ORAL DAILY
Refills: 0 | Status: DISCONTINUED | OUTPATIENT
Start: 2019-11-15 | End: 2019-11-20

## 2019-11-15 RX ORDER — INSULIN GLARGINE 100 [IU]/ML
48 INJECTION, SOLUTION SUBCUTANEOUS AT BEDTIME
Refills: 0 | Status: DISCONTINUED | OUTPATIENT
Start: 2019-11-15 | End: 2019-11-16

## 2019-11-15 RX ADMIN — Medication 81 MILLIGRAM(S): at 11:40

## 2019-11-15 RX ADMIN — Medication 100 MILLIGRAM(S): at 23:11

## 2019-11-15 RX ADMIN — Medication 15 UNIT(S): at 08:55

## 2019-11-15 RX ADMIN — PANTOPRAZOLE SODIUM 40 MILLIGRAM(S): 20 TABLET, DELAYED RELEASE ORAL at 05:51

## 2019-11-15 RX ADMIN — ATORVASTATIN CALCIUM 20 MILLIGRAM(S): 80 TABLET, FILM COATED ORAL at 23:12

## 2019-11-15 RX ADMIN — MAGNESIUM OXIDE 400 MG ORAL TABLET 400 MILLIGRAM(S): 241.3 TABLET ORAL at 17:34

## 2019-11-15 RX ADMIN — CEFEPIME 100 MILLIGRAM(S): 1 INJECTION, POWDER, FOR SOLUTION INTRAMUSCULAR; INTRAVENOUS at 13:09

## 2019-11-15 RX ADMIN — INSULIN GLARGINE 48 UNIT(S): 100 INJECTION, SOLUTION SUBCUTANEOUS at 23:21

## 2019-11-15 RX ADMIN — LISINOPRIL 40 MILLIGRAM(S): 2.5 TABLET ORAL at 05:51

## 2019-11-15 RX ADMIN — SERTRALINE 50 MILLIGRAM(S): 25 TABLET, FILM COATED ORAL at 11:40

## 2019-11-15 RX ADMIN — CEFEPIME 100 MILLIGRAM(S): 1 INJECTION, POWDER, FOR SOLUTION INTRAMUSCULAR; INTRAVENOUS at 23:11

## 2019-11-15 RX ADMIN — ENOXAPARIN SODIUM 40 MILLIGRAM(S): 100 INJECTION SUBCUTANEOUS at 11:40

## 2019-11-15 RX ADMIN — Medication 166.67 MILLIGRAM(S): at 17:34

## 2019-11-15 RX ADMIN — Medication 3: at 08:55

## 2019-11-15 RX ADMIN — Medication 166.67 MILLIGRAM(S): at 05:52

## 2019-11-15 RX ADMIN — Medication 100 MILLIGRAM(S): at 11:40

## 2019-11-15 RX ADMIN — CEFEPIME 100 MILLIGRAM(S): 1 INJECTION, POWDER, FOR SOLUTION INTRAMUSCULAR; INTRAVENOUS at 05:18

## 2019-11-15 RX ADMIN — Medication 1: at 17:35

## 2019-11-15 NOTE — CONSULT NOTE ADULT - SUBJECTIVE AND OBJECTIVE BOX
JOSÉ MANUEL PORTER  69y, Male  Allergy: No Known Allergies      CHIEF COMPLAINT: diabetic foot ulcer (15 Nov 2019 09:01)      HPI:  70 yo M with PMH of insulin-dependent diabetes mellitus , Diabetic foot ulcer s/p R great toe amputation and Partial Hallux Amputation Left Foot was sent to the ED from podiatry office Dr. Bhatia for worsening right lower ext diabetic foot ulcer. patient reports he had pain and feeling warm for only 1 day. he went to his podiatrist for follow up who upon evaluation referred the patient to the ED. patient also reports he stepped over a screw 3 weeks ago. denies discharge, fever , chills, nausea, vomiting or change in bowel or urinary habits.     In ED pt had tachycardia 109, febrile Tmax 100.7, /89.  patient received 3 L LR , zosyn and vanco. (14 Nov 2019 21:20)      INFECTIOUS DISEASE HISTORY:    PAST MEDICAL & SURGICAL HISTORY:  GERD (gastroesophageal reflux disease)  Depression  Diabetic foot ulcer  Dyslipidemia  HTN (hypertension)  DM (diabetes mellitus): 12/27/18 hgb aic  11.2  Toe amputation status, right: (2008)      FAMILY HISTORY  Family history of ischemic heart disease (IHD) (Father)  Family history of lung cancer (Mother)      SOCIAL HISTORY    Pt denies any current heavy ETOH use, IVDU. No recent travel outside the [place].     ROS  General: Denies rigors, nightsweats  HEENT: Denies headache, rhinorrhea, sore throat, eye pain  CV: Denies CP, palpitations  PULM: Denies wheezing, hemoptysis  GI: Denies hematemesis, hematochezia, melena  : Denies discharge, hematuria  MSK: Denies arthralgias, myalgias  SKIN: Denies rash, lesions  NEURO: Denies paresthesias, weakness  PSYCH: Denies depression, anxiety    VITALS:  T(F): 97.5, Max: 100.7 (11-14-19 @ 16:14)  HR: 97  BP: 134/60  RR: 20Vital Signs Last 24 Hrs  T(C): 36.4 (15 Nov 2019 04:45), Max: 38.2 (14 Nov 2019 16:14)  T(F): 97.5 (15 Nov 2019 04:45), Max: 100.7 (14 Nov 2019 16:14)  HR: 97 (15 Nov 2019 04:45) (95 - 109)  BP: 134/60 (15 Nov 2019 04:45) (134/60 - 196/89)  BP(mean): --  RR: 20 (15 Nov 2019 04:45) (18 - 20)  SpO2: 98% (15 Nov 2019 08:19) (98% - 100%)    PHYSICAL EXAM:  Gen: NAD, resting in bed  HEENT: Normocephalic, atraumatic  Neck: supple, no lymphadenopathy  CV: Regular rate & regular rhythm  Lungs: decreased BS at bases, no fremitus  Abdomen: Soft, BS present  Ext: Warm, well perfused, R plantar ulcer btwn submet 2 and 3  Neuro: non focal, awake  Skin: no rash, no erythema  Lines: no phlebitis    TESTS & MEASUREMENTS:                        10.4   10.84 )-----------( 204      ( 15 Nov 2019 06:00 )             32.2     11-15    138  |  100  |  11  ----------------------------<  252<H>  3.8   |  25  |  1.0    Ca    8.7      15 Nov 2019 06:00  Mg     1.5     11-15    TPro  6.8  /  Alb  4.0  /  TBili  0.9  /  DBili  x   /  AST  41  /  ALT  44<H>  /  AlkPhos  112  11-14    eGFR if Non African American: 75 mL/min/1.73M2 (11-15-19 @ 06:00)  eGFR if : 87 mL/min/1.73M2 (11-15-19 @ 06:00)  eGFR if Non African American: 75 mL/min/1.73M2 (11-14-19 @ 17:03)  eGFR if : 87 mL/min/1.73M2 (11-14-19 @ 17:03)    LIVER FUNCTIONS - ( 14 Nov 2019 17:03 )  Alb: 4.0 g/dL / Pro: 6.8 g/dL / ALK PHOS: 112 U/L / ALT: 44 U/L / AST: 41 U/L / GGT: x                 Blood Gas Venous - Lactate: 1.8 mmoL/L (11-14-19 @ 18:12)      INFECTIOUS DISEASES TESTING      RADIOLOGY & ADDITIONAL TESTS:  I have personally reviewed the last Chest xray  CXR      CT      CARDIOLOGY TESTING      MEDICATIONS  aspirin enteric coated 81  atorvastatin 20  cefepime   IVPB 2000  chlorhexidine 4% Liquid 1  dextrose 5%. 1000  dextrose 50% Injectable 12.5  dextrose 50% Injectable 25  dextrose 50% Injectable 25  enoxaparin Injectable 40  insulin glargine Injectable (LANTUS) 45  insulin lispro (HumaLOG) corrective regimen sliding scale   insulin lispro Injectable (HumaLOG) 15  lisinopril 40  NIFEdipine XL 60  pantoprazole    Tablet 40  sertraline 50  vancomycin  IVPB 1250      ANTIBIOTICS:  cefepime   IVPB 2000 milliGRAM(s) IV Intermittent every 8 hours  vancomycin  IVPB 1250 milliGRAM(s) IV Intermittent every 12 hours      ALLERGIES:  No Known Allergies JOSÉ MANUEL PORTER  69y, Male  Allergy: No Known Allergies      CHIEF COMPLAINT: diabetic foot ulcer (15 Nov 2019 09:01)      HPI:  68 yo M with PMH of insulin-dependent diabetes mellitus , Diabetic foot ulcer s/p R great toe amputation and Partial Hallux Amputation Left Foot was sent to the ED from podiatry office Dr. Bhatia for worsening right lower ext diabetic foot ulcer. patient reports he had pain and feeling warm for only 1 day. he went to his podiatrist for follow up who upon evaluation referred the patient to the ED. patient also reports he stepped over a screw 3 weeks ago. denies discharge, fever , chills, nausea, vomiting or change in bowel or urinary habits.     In ED pt had tachycardia 109, febrile Tmax 100.7, /89.  patient received 3 L LR , zosyn and vanco. (14 Nov 2019 21:20)      INFECTIOUS DISEASE HISTORY:  Pt stepped on a screw 3 weeks ago  Reports some chills at home    PAST MEDICAL & SURGICAL HISTORY:  GERD (gastroesophageal reflux disease)  Depression  Diabetic foot ulcer  Dyslipidemia  HTN (hypertension)  DM (diabetes mellitus): 12/27/18 hgb aic  11.2  Toe amputation status, right: (2008)      FAMILY HISTORY  Family history of ischemic heart disease (IHD) (Father)  Family history of lung cancer (Mother)      SOCIAL HISTORY    Pt denies any current heavy ETOH use, IVDU. No recent travel outside the [place].     ROS  General: Denies rigors, nightsweats  HEENT: Denies headache, rhinorrhea, sore throat, eye pain  CV: Denies CP, palpitations  PULM: Denies wheezing, hemoptysis  GI: Denies hematemesis, hematochezia, melena  : Denies discharge, hematuria  MSK: Denies arthralgias, myalgias  SKIN: Denies rash, lesions  NEURO: Denies paresthesias, weakness  PSYCH: Denies depression, anxiety    VITALS:  T(F): 97.5, Max: 100.7 (11-14-19 @ 16:14)  HR: 97  BP: 134/60  RR: 20Vital Signs Last 24 Hrs  T(C): 36.4 (15 Nov 2019 04:45), Max: 38.2 (14 Nov 2019 16:14)  T(F): 97.5 (15 Nov 2019 04:45), Max: 100.7 (14 Nov 2019 16:14)  HR: 97 (15 Nov 2019 04:45) (95 - 109)  BP: 134/60 (15 Nov 2019 04:45) (134/60 - 196/89)  BP(mean): --  RR: 20 (15 Nov 2019 04:45) (18 - 20)  SpO2: 98% (15 Nov 2019 08:19) (98% - 100%)    PHYSICAL EXAM:  Gen: NAD, resting in bed  HEENT: Normocephalic, atraumatic  Neck: supple, no lymphadenopathy  CV: Regular rate & regular rhythm  Lungs: decreased BS at bases, no fremitus  Abdomen: Soft, BS present  Ext: Warm, well perfused, R plantar ulcer btwn submet 2 and 3, some purulence   Neuro: non focal, awake  Skin: no rash, no erythema  Lines: no phlebitis    TESTS & MEASUREMENTS:                        10.4   10.84 )-----------( 204      ( 15 Nov 2019 06:00 )             32.2     11-15    138  |  100  |  11  ----------------------------<  252<H>  3.8   |  25  |  1.0    Ca    8.7      15 Nov 2019 06:00  Mg     1.5     11-15    TPro  6.8  /  Alb  4.0  /  TBili  0.9  /  DBili  x   /  AST  41  /  ALT  44<H>  /  AlkPhos  112  11-14    eGFR if Non African American: 75 mL/min/1.73M2 (11-15-19 @ 06:00)  eGFR if : 87 mL/min/1.73M2 (11-15-19 @ 06:00)  eGFR if Non African American: 75 mL/min/1.73M2 (11-14-19 @ 17:03)  eGFR if : 87 mL/min/1.73M2 (11-14-19 @ 17:03)    LIVER FUNCTIONS - ( 14 Nov 2019 17:03 )  Alb: 4.0 g/dL / Pro: 6.8 g/dL / ALK PHOS: 112 U/L / ALT: 44 U/L / AST: 41 U/L / GGT: x                 Blood Gas Venous - Lactate: 1.8 mmoL/L (11-14-19 @ 18:12)      INFECTIOUS DISEASES TESTING      RADIOLOGY & ADDITIONAL TESTS:  I have personally reviewed the last Chest xray  CXR      CT      CARDIOLOGY TESTING      MEDICATIONS  aspirin enteric coated 81  atorvastatin 20  cefepime   IVPB 2000  chlorhexidine 4% Liquid 1  dextrose 5%. 1000  dextrose 50% Injectable 12.5  dextrose 50% Injectable 25  dextrose 50% Injectable 25  enoxaparin Injectable 40  insulin glargine Injectable (LANTUS) 45  insulin lispro (HumaLOG) corrective regimen sliding scale   insulin lispro Injectable (HumaLOG) 15  lisinopril 40  NIFEdipine XL 60  pantoprazole    Tablet 40  sertraline 50  vancomycin  IVPB 1250      ANTIBIOTICS:  cefepime   IVPB 2000 milliGRAM(s) IV Intermittent every 8 hours  vancomycin  IVPB 1250 milliGRAM(s) IV Intermittent every 12 hours      ALLERGIES:  No Known Allergies

## 2019-11-15 NOTE — CONSULT NOTE ADULT - ASSESSMENT
ASSESSMENT  68 yo M with PMH of insulin-dependent diabetes mellitus , Diabetic foot ulcer s/p R great toe amputation and Partial Hallux Amputation Left Foot admitted for DFU    IMPRESSION  #R plantar DFU    xray Soft tissue irregularity in the forefoot consistent with given history of open wound. No radiographic evidence of osteomyelitis.    Sedimentation Rate, Erythrocyte: 49 mm/Hr (11.14.19 @ 17:03) <-- 61 8/2019    CRP 15    8/2019 tissue cx ecoli/kleb panS  #Tm 100.7 P>90 WBC 12.89  #Obesity BMI (kg/m2): 30.1  #DM Hemoglobin A1C, Whole Blood: 11.2 (12-27-18 @ 06:07)    RECOMMENDATIONS  - This is a pended note, all recommendations to follow.    - f/u WCX, BCX    Spectra [id number] ASSESSMENT  70 yo M with PMH of insulin-dependent diabetes mellitus , Diabetic foot ulcer s/p R great toe amputation and Partial Hallux Amputation Left Foot admitted for DFU    IMPRESSION  #R plantar DFU after stepping on a screw 3 weeks ago, r/o OM     xray Soft tissue irregularity in the forefoot consistent with given history of open wound. No radiographic evidence of osteomyelitis.    Sedimentation Rate, Erythrocyte: 49 mm/Hr (11.14.19 @ 17:03) <-- 61 8/2019    CRP 15    8/2019 tissue cx ecoli/kleb panS  #Tm 100.7 P>90 WBC 12.89, sepsis ruled out  #Obesity BMI (kg/m2): 30.1  #DM Hemoglobin A1C, Whole Blood: 11.2 (12-27-18 @ 06:07)    RECOMMENDATIONS  - MRI r/o OM  - f/u WCX, BCX  - ADD flagyl 500mg q8h IV for anaerobic coverage  - Cefepime 2g q8h IV  - Vanc 1.25 q12h IV   - Please check vanc trough 30 min prior to 4th dose      Spectra [phone]

## 2019-11-15 NOTE — PROGRESS NOTE ADULT - SUBJECTIVE AND OBJECTIVE BOX
SUBJECTIVE:    Patient is a 69y old Male who presents with a chief complaint of diabetic foot ulcer (14 Nov 2019 21:20)    Currently admitted to medicine with the primary diagnosis of Diabetic foot ulcer     Today is hospital day 1d. This morning he is resting comfortably in bed and reports no new issues or overnight events.     PAST MEDICAL & SURGICAL HISTORY  GERD (gastroesophageal reflux disease)  Depression  Diabetic foot ulcer  Dyslipidemia  HTN (hypertension)  DM (diabetes mellitus): 12/27/18 hgb aic  11.2  Toe amputation status, right: (2008)    SOCIAL HISTORY:  Negative for smoking/alcohol/drug use.     ALLERGIES:  No Known Allergies    MEDICATIONS:  STANDING MEDICATIONS  aspirin enteric coated 81 milliGRAM(s) Oral daily  atorvastatin 20 milliGRAM(s) Oral at bedtime  cefepime   IVPB 2000 milliGRAM(s) IV Intermittent every 8 hours  chlorhexidine 4% Liquid 1 Application(s) Topical <User Schedule>  dextrose 5%. 1000 milliLiter(s) IV Continuous <Continuous>  dextrose 50% Injectable 12.5 Gram(s) IV Push once  dextrose 50% Injectable 25 Gram(s) IV Push once  dextrose 50% Injectable 25 Gram(s) IV Push once  enoxaparin Injectable 40 milliGRAM(s) SubCutaneous daily  insulin glargine Injectable (LANTUS) 45 Unit(s) SubCutaneous at bedtime  insulin lispro (HumaLOG) corrective regimen sliding scale   SubCutaneous three times a day before meals  insulin lispro Injectable (HumaLOG) 15 Unit(s) SubCutaneous three times a day before meals  lisinopril 40 milliGRAM(s) Oral daily  NIFEdipine XL 60 milliGRAM(s) Oral daily  pantoprazole    Tablet 40 milliGRAM(s) Oral before breakfast  sertraline 50 milliGRAM(s) Oral daily  vancomycin  IVPB 1250 milliGRAM(s) IV Intermittent every 12 hours    PRN MEDICATIONS  dextrose 40% Gel 15 Gram(s) Oral once PRN  glucagon  Injectable 1 milliGRAM(s) IntraMuscular once PRN    VITALS:   T(F): 97.5  HR: 97  BP: 134/60  RR: 20  SpO2: 98%    LABS:                        10.4   10.84 )-----------( 204      ( 15 Nov 2019 06:00 )             32.2     11-15    138  |  100  |  11  ----------------------------<  252<H>  3.8   |  25  |  1.0    Ca    8.7      15 Nov 2019 06:00  Mg     1.5     11-15    TPro  6.8  /  Alb  4.0  /  TBili  0.9  /  DBili  x   /  AST  41  /  ALT  44<H>  /  AlkPhos  112  11-14          Sedimentation Rate, Erythrocyte: 49 mm/Hr <H> (11-14-19 @ 17:03)          RADIOLOGY:    PHYSICAL EXAM:  GEN: No acute distress  LUNGS: Clear to auscultation bilaterally   HEART: Regular  ABD: Soft, non-tender, non-distended.  EXT: NC/NC/NE/2+PP/[place]/Skin Intact.   NEURO: AAOX3    Intravenous access:   NG tube:   Basilio Catheter: SUBJECTIVE:    Patient is a 69y old Male who presents with a chief complaint of diabetic foot ulcer (14 Nov 2019 21:20)    Currently admitted to medicine with the primary diagnosis of Diabetic foot ulcer     Today is hospital day 1d. This morning he is resting comfortably in bed and reports no new issues or overnight events.     PAST MEDICAL & SURGICAL HISTORY  GERD (gastroesophageal reflux disease)  Depression  Diabetic foot ulcer  Dyslipidemia  HTN (hypertension)  DM (diabetes mellitus): 12/27/18 hgb aic  11.2  Toe amputation status, right: (2008)    SOCIAL HISTORY:  Negative for smoking/alcohol/drug use.     ALLERGIES:  No Known Allergies    MEDICATIONS:  STANDING MEDICATIONS  aspirin enteric coated 81 milliGRAM(s) Oral daily  atorvastatin 20 milliGRAM(s) Oral at bedtime  cefepime   IVPB 2000 milliGRAM(s) IV Intermittent every 8 hours  chlorhexidine 4% Liquid 1 Application(s) Topical <User Schedule>  dextrose 5%. 1000 milliLiter(s) IV Continuous <Continuous>  dextrose 50% Injectable 12.5 Gram(s) IV Push once  dextrose 50% Injectable 25 Gram(s) IV Push once  dextrose 50% Injectable 25 Gram(s) IV Push once  enoxaparin Injectable 40 milliGRAM(s) SubCutaneous daily  insulin glargine Injectable (LANTUS) 45 Unit(s) SubCutaneous at bedtime  insulin lispro (HumaLOG) corrective regimen sliding scale   SubCutaneous three times a day before meals  insulin lispro Injectable (HumaLOG) 15 Unit(s) SubCutaneous three times a day before meals  lisinopril 40 milliGRAM(s) Oral daily  NIFEdipine XL 60 milliGRAM(s) Oral daily  pantoprazole    Tablet 40 milliGRAM(s) Oral before breakfast  sertraline 50 milliGRAM(s) Oral daily  vancomycin  IVPB 1250 milliGRAM(s) IV Intermittent every 12 hours    PRN MEDICATIONS  dextrose 40% Gel 15 Gram(s) Oral once PRN  glucagon  Injectable 1 milliGRAM(s) IntraMuscular once PRN    VITALS:   T(F): 97.5  HR: 97  BP: 134/60  RR: 20  SpO2: 98%    LABS:                        10.4   10.84 )-----------( 204      ( 15 Nov 2019 06:00 )             32.2     11-15    138  |  100  |  11  ----------------------------<  252<H>  3.8   |  25  |  1.0    Ca    8.7      15 Nov 2019 06:00  Mg     1.5     11-15    TPro  6.8  /  Alb  4.0  /  TBili  0.9  /  DBili  x   /  AST  41  /  ALT  44<H>  /  AlkPhos  112  11-14          Sedimentation Rate, Erythrocyte: 49 mm/Hr <H> (11-14-19 @ 17:03)          RADIOLOGY:    PHYSICAL EXAM:  GEN: No acute distress  LUNGS: Clear to auscultation bilaterally   HEART: Regular  ABD: Soft, non-tender, non-distended.  EXT: Left partial Halux amputation, Right foot dressed  NEURO: AAOX3

## 2019-11-15 NOTE — PROGRESS NOTE ADULT - ASSESSMENT
70 yo M with PMH of insulin-dependent diabetes mellitus , Diabetic foot ulcer s/p R great toe amputation and Partial Hallux Amputation Left Foot was sent to the ED from podiatry office Dr. Bhatia for worsening right lower ext diabetic foot ulcer.     # Sepsis on admission secondary to right diabetic planter foot ulcer:  - WBC: 12.89, fever 100.7 and tachycardia 109  - ESR 49 , pending CRP  - podiatry is following the patient, Wound Flushed w/ Peroxide and Irrigated w/ Normal Saline; Applied Betadine Soaked Gauze / DSD / Kerlix   - follow up Wound Culture   - XR Imaging; No Radiographic Evidence of Osteomyelitis   - wound culture from last admission from left foot wound: 8/13/2019  Numerous Escherichia coli sensitive to cefepime, Numerous Klebsiella variicola sensitive to cefepime,  Moderate Enterococcus faecalis sensitive to Unasyn and vanco  - will give cefepime per culture results, and to cover pseudomonas in patient with diabetes and planter trumatic foot injury   - will also give vanco to cover for E. fecalis on previous wound culture  - ID consult     # insulin-dependent diabetes mellitus:- FS and insulin b/b  # HTN urgency: c/w ACE, will add procardia, adjust accordingly   # DLD: c/w statin    DVT ppx: Lovenox  GI: PPI   CHO consistent  dispo: from home 70 yo M with PMH of insulin-dependent diabetes mellitus , Diabetic foot ulcer s/p R great toe amputation and Partial Hallux Amputation Left Foot was sent to the ED from podiatry office Dr. Bhatia for worsening right lower ext diabetic foot ulcer.     # Sepsis on admission secondary to right diabetic planter foot ulcer:  - WBC: 12.89, fever 100.7 and tachycardia 109  - ESR 49 , CRP 15.52  - podiatry is following the patient, will decide Sx vs Abx based on imaging (patient wishes to avoid Sx)  - follow up Wound Culture   - XR Imaging; Plantar ulcer at the forefoot region, without definite radiographic evidence of osteomyelitis. Consider MRI of the right forefoot for further evaluation.   - wound culture from last admission from left foot wound: 8/13/2019  Numerous Escherichia coli sensitive to cefepime, Numerous Klebsiella variicola sensitive to cefepime,  Moderate Enterococcus faecalis sensitive to Unasyn and vanco  - will give cefepime per culture results, and to cover pseudomonas in patient with diabetes and planter traumatic foot injury   - Patient received tetanus shot on previous admission  - will also give vanco to cover for E. fecalis on previous wound culture  - ID consult: MRI to r/o OM, check Vanc trough prior to 4th dose  - Vascular consult placed, follows vascular as Outpt, Was recommended angiogram as Outpt after previous d/c in Aug. (EKG and CXR for possible clearance if procedure indicated)  - Duplex repeated > awaiting report    # insulin-dependent diabetes mellitus:  - FS and insulin b/b  - Will decrease dose as sugars running low    # HTN urgency:  - c/w ACE, will add procardia, adjust accordingly     # DLD:  - c/w statin    DVT ppx: Lovenox  GI: PPI   CHO consistent  dispo: from home

## 2019-11-16 LAB
ALBUMIN SERPL ELPH-MCNC: 3.2 G/DL — LOW (ref 3.5–5.2)
ALP SERPL-CCNC: 93 U/L — SIGNIFICANT CHANGE UP (ref 30–115)
ALT FLD-CCNC: 22 U/L — SIGNIFICANT CHANGE UP (ref 0–41)
ANION GAP SERPL CALC-SCNC: 11 MMOL/L — SIGNIFICANT CHANGE UP (ref 7–14)
AST SERPL-CCNC: 15 U/L — SIGNIFICANT CHANGE UP (ref 0–41)
BASOPHILS # BLD AUTO: 0.02 K/UL — SIGNIFICANT CHANGE UP (ref 0–0.2)
BASOPHILS NFR BLD AUTO: 0.2 % — SIGNIFICANT CHANGE UP (ref 0–1)
BILIRUB SERPL-MCNC: 0.7 MG/DL — SIGNIFICANT CHANGE UP (ref 0.2–1.2)
BLD GP AB SCN SERPL QL: SIGNIFICANT CHANGE UP
BUN SERPL-MCNC: 11 MG/DL — SIGNIFICANT CHANGE UP (ref 10–20)
CALCIUM SERPL-MCNC: 8.3 MG/DL — LOW (ref 8.5–10.1)
CHLORIDE SERPL-SCNC: 101 MMOL/L — SIGNIFICANT CHANGE UP (ref 98–110)
CO2 SERPL-SCNC: 27 MMOL/L — SIGNIFICANT CHANGE UP (ref 17–32)
CREAT SERPL-MCNC: 0.9 MG/DL — SIGNIFICANT CHANGE UP (ref 0.7–1.5)
CULTURE RESULTS: NO GROWTH — SIGNIFICANT CHANGE UP
EOSINOPHIL # BLD AUTO: 0.1 K/UL — SIGNIFICANT CHANGE UP (ref 0–0.7)
EOSINOPHIL NFR BLD AUTO: 1 % — SIGNIFICANT CHANGE UP (ref 0–8)
GLUCOSE BLDC GLUCOMTR-MCNC: 178 MG/DL — HIGH (ref 70–99)
GLUCOSE BLDC GLUCOMTR-MCNC: 199 MG/DL — HIGH (ref 70–99)
GLUCOSE BLDC GLUCOMTR-MCNC: 211 MG/DL — HIGH (ref 70–99)
GLUCOSE BLDC GLUCOMTR-MCNC: 261 MG/DL — HIGH (ref 70–99)
GLUCOSE BLDC GLUCOMTR-MCNC: 72 MG/DL — SIGNIFICANT CHANGE UP (ref 70–99)
GLUCOSE SERPL-MCNC: 226 MG/DL — HIGH (ref 70–99)
HCT VFR BLD CALC: 29.8 % — LOW (ref 42–52)
HGB BLD-MCNC: 10 G/DL — LOW (ref 14–18)
IMM GRANULOCYTES NFR BLD AUTO: 0.5 % — HIGH (ref 0.1–0.3)
INR BLD: 1.45 RATIO — HIGH (ref 0.65–1.3)
LYMPHOCYTES # BLD AUTO: 0.51 K/UL — LOW (ref 1.2–3.4)
LYMPHOCYTES # BLD AUTO: 5.2 % — LOW (ref 20.5–51.1)
MAGNESIUM SERPL-MCNC: 1.7 MG/DL — LOW (ref 1.8–2.4)
MCHC RBC-ENTMCNC: 29.4 PG — SIGNIFICANT CHANGE UP (ref 27–31)
MCHC RBC-ENTMCNC: 33.6 G/DL — SIGNIFICANT CHANGE UP (ref 32–37)
MCV RBC AUTO: 87.6 FL — SIGNIFICANT CHANGE UP (ref 80–94)
MONOCYTES # BLD AUTO: 0.89 K/UL — HIGH (ref 0.1–0.6)
MONOCYTES NFR BLD AUTO: 9 % — SIGNIFICANT CHANGE UP (ref 1.7–9.3)
NEUTROPHILS # BLD AUTO: 8.33 K/UL — HIGH (ref 1.4–6.5)
NEUTROPHILS NFR BLD AUTO: 84.1 % — HIGH (ref 42.2–75.2)
NRBC # BLD: 0 /100 WBCS — SIGNIFICANT CHANGE UP (ref 0–0)
PLATELET # BLD AUTO: 230 K/UL — SIGNIFICANT CHANGE UP (ref 130–400)
POTASSIUM SERPL-MCNC: 4.4 MMOL/L — SIGNIFICANT CHANGE UP (ref 3.5–5)
POTASSIUM SERPL-SCNC: 4.4 MMOL/L — SIGNIFICANT CHANGE UP (ref 3.5–5)
PROT SERPL-MCNC: 5.6 G/DL — LOW (ref 6–8)
PROTHROM AB SERPL-ACNC: 16.6 SEC — HIGH (ref 9.95–12.87)
RBC # BLD: 3.4 M/UL — LOW (ref 4.7–6.1)
RBC # FLD: 12.9 % — SIGNIFICANT CHANGE UP (ref 11.5–14.5)
SODIUM SERPL-SCNC: 139 MMOL/L — SIGNIFICANT CHANGE UP (ref 135–146)
SPECIMEN SOURCE: SIGNIFICANT CHANGE UP
VANCOMYCIN TROUGH SERPL-MCNC: 14.1 UG/ML — HIGH (ref 5–10)
WBC # BLD: 9.9 K/UL — SIGNIFICANT CHANGE UP (ref 4.8–10.8)
WBC # FLD AUTO: 9.9 K/UL — SIGNIFICANT CHANGE UP (ref 4.8–10.8)

## 2019-11-16 PROCEDURE — 99232 SBSQ HOSP IP/OBS MODERATE 35: CPT

## 2019-11-16 RX ORDER — INSULIN GLARGINE 100 [IU]/ML
45 INJECTION, SOLUTION SUBCUTANEOUS AT BEDTIME
Refills: 0 | Status: DISCONTINUED | OUTPATIENT
Start: 2019-11-16 | End: 2019-11-19

## 2019-11-16 RX ORDER — INSULIN LISPRO 100/ML
15 VIAL (ML) SUBCUTANEOUS
Refills: 0 | Status: DISCONTINUED | OUTPATIENT
Start: 2019-11-16 | End: 2019-11-17

## 2019-11-16 RX ADMIN — Medication 166.67 MILLIGRAM(S): at 18:20

## 2019-11-16 RX ADMIN — Medication 60 MILLIGRAM(S): at 05:06

## 2019-11-16 RX ADMIN — Medication 100 MILLIGRAM(S): at 21:19

## 2019-11-16 RX ADMIN — Medication 81 MILLIGRAM(S): at 12:07

## 2019-11-16 RX ADMIN — CEFEPIME 100 MILLIGRAM(S): 1 INJECTION, POWDER, FOR SOLUTION INTRAMUSCULAR; INTRAVENOUS at 13:18

## 2019-11-16 RX ADMIN — Medication 100 MILLIGRAM(S): at 05:53

## 2019-11-16 RX ADMIN — Medication 15 UNIT(S): at 17:47

## 2019-11-16 RX ADMIN — ENOXAPARIN SODIUM 40 MILLIGRAM(S): 100 INJECTION SUBCUTANEOUS at 12:07

## 2019-11-16 RX ADMIN — PANTOPRAZOLE SODIUM 40 MILLIGRAM(S): 20 TABLET, DELAYED RELEASE ORAL at 06:03

## 2019-11-16 RX ADMIN — SERTRALINE 50 MILLIGRAM(S): 25 TABLET, FILM COATED ORAL at 12:07

## 2019-11-16 RX ADMIN — Medication 12 UNIT(S): at 09:13

## 2019-11-16 RX ADMIN — Medication 1: at 17:48

## 2019-11-16 RX ADMIN — CEFEPIME 100 MILLIGRAM(S): 1 INJECTION, POWDER, FOR SOLUTION INTRAMUSCULAR; INTRAVENOUS at 05:06

## 2019-11-16 RX ADMIN — ATORVASTATIN CALCIUM 20 MILLIGRAM(S): 80 TABLET, FILM COATED ORAL at 21:19

## 2019-11-16 RX ADMIN — Medication 2: at 09:13

## 2019-11-16 RX ADMIN — Medication 166.67 MILLIGRAM(S): at 05:06

## 2019-11-16 RX ADMIN — MAGNESIUM OXIDE 400 MG ORAL TABLET 400 MILLIGRAM(S): 241.3 TABLET ORAL at 12:07

## 2019-11-16 RX ADMIN — CEFEPIME 100 MILLIGRAM(S): 1 INJECTION, POWDER, FOR SOLUTION INTRAMUSCULAR; INTRAVENOUS at 21:18

## 2019-11-16 RX ADMIN — INSULIN GLARGINE 45 UNIT(S): 100 INJECTION, SOLUTION SUBCUTANEOUS at 22:07

## 2019-11-16 RX ADMIN — CHLORHEXIDINE GLUCONATE 1 APPLICATION(S): 213 SOLUTION TOPICAL at 05:53

## 2019-11-16 RX ADMIN — LISINOPRIL 40 MILLIGRAM(S): 2.5 TABLET ORAL at 05:06

## 2019-11-16 RX ADMIN — Medication 100 MILLIGRAM(S): at 13:18

## 2019-11-16 NOTE — PROGRESS NOTE ADULT - ASSESSMENT
68 yo M with PMH of insulin-dependent diabetes mellitus, Diabetic foot ulcer s/p R great toe amputation and Partial Hallux Amputation Left Foot was sent to the ED from podiatry office Dr. Bhatia for worsening right lower ext diabetic foot ulcer.     # right diabetic planter foot ulcer after trauma by screw; no pain  sepsis criteria were met on admission (WBC 12.9, , RR 20, T 100.7) (I disagree w/ ID attd note)  sepsis now resolved  ESR 49 , CRP 15.5  ID noted  abx vanco/cefepime/flagyl; check vanco tr today (d/w PGY 2 - Dr Vasquez)  Wound Culture - nl devendra  - wound culture from left foot: 8/13/2019: Escherichia coli sensitive to cefepime, Klebsiella variicola sensitive to cefepime,  Enterococcus faecalis sensitive to Unasyn and vanco  xray noted - no OM; + degen changes  will discuss w/ team further plan after MRI rt foot w/ gado resulted (done)  Pod noted  Wound Care (per pod team): Flush w/ Peroxide and Irrigated w/ Normal Saline; Applied Betadine Soaked Gauze / DSD / Kerlix / ACE q24  Pod might do contact casting; pt might need Darco shoe    # PVD  had + duplex last admit and on this admit for rt tib dz  vasc eval - for angio on Wed (IM will do pre-op eval on Mon) - obtain new ECG (ordered)  cont asa 81mg po q24    # insulin-dependent diabetes mellitus T2; not controlled - A1c 9.4  FS qac/HS and keep btw 100-180 - control is up and down, needs further adjustments  Lantus 45 and lisp 15/meal w/ +2 CF scale  diabetic diet    # hypomagnesemia  Mg Ox 400mg po q24  recheck lvl in few days    # HTN: controlled  c/w ACE, procardia    # DLD: c/w statin    # depression  on SSRI    # normo an of chr dz  no further inpt w/u    # DVT ppx: Lovenox    # GI: PPI     dispo: f/u results for MRI rt foot; f/u w/ POD, ID; check vanco tr;  f/u VASC for angio Wed; pre-op eval for Mon; adjust tx for DM and HTN as needed

## 2019-11-16 NOTE — CONSULT NOTE ADULT - SUBJECTIVE AND OBJECTIVE BOX
Consultation Note  =====================================================    HPI: 69y Male  HPI:  68 yo M with PMH of insulin-dependent diabetes mellitus , Diabetic foot ulcer s/p R great toe amputation and Partial Hallux Amputation Left Foot was sent to the ED from podiatry office Dr. Bhatia for worsening right lower ext diabetic foot ulcer. patient reports he had pain and feeling warm for only 1 day. he went to his podiatrist for follow up who upon evaluation referred the patient to the ED. patient also reports he stepped over a screw 3 weeks ago. denies discharge, fever , chills, nausea, vomiting or change in bowel or urinary habits.     Vascular: Dr. Espinosa did a LLE angio on 19. Vascular consulted for a nonhealing diabetic foot ulcer.    In ED pt had tachycardia 109, febrile Tmax 100.7, /89.  patient received 3 L LR , zosyn and vanco.     Attd: here for eval of rt foot ulcer, which is getting larger in size; pt developed this after stepping on screw 3 wks ago (got Td vac); pt says rt foot and lower leg were very hot; he developed fever; his pod sent him in for IV abx and further eval for sx; pt is otherwise fine; no CP or SOB (2019 21:20)      PAST MEDICAL & SURGICAL HISTORY:  GERD (gastroesophageal reflux disease)  Depression  Diabetic foot ulcer  Dyslipidemia  HTN (hypertension)  DM (diabetes mellitus): 18 hgb aic  11.2  Toe amputation status, right: ()    Home Meds: Home Medications:  benazepril 40 mg oral tablet: 1 tab(s) orally once a day (2019 09:51)  Crestor 10 mg oral tablet: 1 tab(s) orally once a day (at bedtime) (2019 09:51)  HumuLIN 70/30 KwikPen 70 units-30 units/mL subcutaneous suspension: 50 unit(s) subcutaneous once a day before breakfast (2019 09:51)  HumuLIN 70/30 subcutaneous suspension: 40 unit(s) subcutaneous once a day (at bedtime) (2019 09:51)  Janumet 50 mg-1000 mg oral tablet: 1 tab(s) orally 2 times a day (2019 09:51)  omeprazole 20 mg oral delayed release capsule: 1 cap(s) orally once a day (2019 09:51)  sertraline 50 mg oral tablet: 1 tab(s) orally once a day (2019 09:51)    Allergies: Allergies    No Known Allergies    Intolerances      Soc:   Denies Tobacco/EtOH/Substance use  Tobacco: [  ] yes  EtOH: [  ] yes  Substance use: [  ] yes  Advanced Directives: Presumed Full Code     ROS:    REVIEW OF SYSTEMS    [ ] A ten-point review of systems was otherwise negative except as noted.  [ ] Due to altered mental status/intubation, subjective information were not able to be obtained from the patient. History was obtained, to the extent possible, from review of the chart and collateral sources of information.      CURRENT MEDICATIONS:   --------------------------------------------------------------------------------------  Neurologic Medications  sertraline 50 milliGRAM(s) Oral daily    Respiratory Medications    Cardiovascular Medications  lisinopril 40 milliGRAM(s) Oral daily  NIFEdipine XL 60 milliGRAM(s) Oral daily    Gastrointestinal Medications  dextrose 5%. 1000 milliLiter(s) IV Continuous <Continuous>  magnesium oxide 400 milliGRAM(s) Oral daily  pantoprazole    Tablet 40 milliGRAM(s) Oral before breakfast    Genitourinary Medications    Hematologic/Oncologic Medications  aspirin enteric coated 81 milliGRAM(s) Oral daily  enoxaparin Injectable 40 milliGRAM(s) SubCutaneous daily    Antimicrobial/Immunologic Medications  cefepime   IVPB 2000 milliGRAM(s) IV Intermittent every 8 hours  metroNIDAZOLE  IVPB      metroNIDAZOLE  IVPB 500 milliGRAM(s) IV Intermittent every 8 hours  vancomycin  IVPB 1250 milliGRAM(s) IV Intermittent every 12 hours    Endocrine/Metabolic Medications  atorvastatin 20 milliGRAM(s) Oral at bedtime  dextrose 40% Gel 15 Gram(s) Oral once PRN Blood Glucose LESS THAN 70 milliGRAM(s)/deciliter  dextrose 50% Injectable 12.5 Gram(s) IV Push once  dextrose 50% Injectable 25 Gram(s) IV Push once  dextrose 50% Injectable 25 Gram(s) IV Push once  glucagon  Injectable 1 milliGRAM(s) IntraMuscular once PRN Glucose LESS THAN 70 milligrams/deciliter  insulin glargine Injectable (LANTUS) 48 Unit(s) SubCutaneous at bedtime  insulin lispro (HumaLOG) corrective regimen sliding scale   SubCutaneous three times a day before meals  insulin lispro Injectable (HumaLOG) 12 Unit(s) SubCutaneous three times a day before meals    Topical/Other Medications  chlorhexidine 4% Liquid 1 Application(s) Topical <User Schedule>    --------------------------------------------------------------------------------------    VITAL SIGNS, INS/OUTS (last 24 hours):  --------------------------------------------------------------------------------------  ICU Vital Signs Last 24 Hrs  T(C): 37 (15 Nov 2019 19:42), Max: 37.2 (15 Nov 2019 12:52)  T(F): 98.6 (15 Nov 2019 19:42), Max: 99 (15 Nov 2019 12:52)  HR: 97 (15 Nov 2019 19:42) (93 - 97)  BP: 141/64 (15 Nov 2019 19:42) (116/56 - 141/64)  BP(mean): --  ABP: --  ABP(mean): --  RR: 18 (15 Nov 2019 19:42) (18 - 20)  SpO2: 98% (15 Nov 2019 08:19) (98% - 98%)    I&O's Summary    --------------------------------------------------------------------------------------  PHYSICAL EXAM  General: NAD, AAOx3, calm and cooperative  HEENT: NCAT, ELIZABETH, EOMI, Trachea ML, Neck supple  Cardiac: RRR S1, S2, no Murmurs, rubs or gallops  Respiratory: CTAB, normal respiratory effort  Abdomen: Soft, non-distended, non-tender  Musculoskeletal: Strength 5/5 BL UE/LE, ROM intact, compartments soft  Neuro: Sensation grossly intact  Vascular: Dopplerable pulses DP / PT bilaterally      LABS  --------------------------------------------------------------------------------------  Labs:  CAPILLARY BLOOD GLUCOSE      POCT Blood Glucose.: 221 mg/dL (15 Nov 2019 23:18)  POCT Blood Glucose.: 179 mg/dL (15 Nov 2019 16:58)  POCT Blood Glucose.: 65 mg/dL (15 Nov 2019 11:51)  POCT Blood Glucose.: 131 mg/dL (15 Nov 2019 10:12)  POCT Blood Glucose.: 252 mg/dL (15 Nov 2019 07:49)                          10.4   10.84 )-----------( 204      ( 15 Nov 2019 06:00 )             32.2       Auto Neutrophil %: 79.2 % (11-15-19 @ 06:00)  Auto Immature Granulocyte %: 0.6 % (11-15-19 @ 06:00)    11-15    138  |  100  |  11  ----------------------------<  252<H>  3.8   |  25  |  1.0      Calcium, Total Serum: 8.7 mg/dL (11-15-19 @ 06:00)      LFTs:             6.8  | 0.9  | 41       ------------------[112     ( 2019 17:03 )  4.0  | x    | 44          Lipase:x      Amylase:x         Blood Gas Venous - Lactate: 1.8 mmoL/L (19 @ 18:12)    Urinalysis Basic - ( 15 Nov 2019 10:20 )    Color: Yellow / Appearance: Slightly Turbid / S.022 / pH: x  Gluc: x / Ketone: Trace  / Bili: Negative / Urobili: <2 mg/dL   Blood: x / Protein: 30 mg/dL / Nitrite: Negative   Leuk Esterase: Negative / RBC: 3 /HPF / WBC 3 /HPF   Sq Epi: x / Non Sq Epi: 1 /HPF / Bacteria: Negative        Culture - Blood (collected 2019 17:03)  Source: .Blood Blood  Preliminary Report (15 Nov 2019 23:02):    No growth to date.    Culture - Blood (collected 2019 17:03)  Source: .Blood Blood  Preliminary Report (15 Nov 2019 23:02):    No growth to date.

## 2019-11-16 NOTE — CONSULT NOTE ADULT - ASSESSMENT
ASSESSMENT:  68 yo M with PMH of insulin-dependent diabetes mellitus, Diabetic foot ulcer s/p R great toe amputation and Partial Hallux Amputation Left Foot was sent to the ED from podiatry office Dr. Bhatia for worsening right lower ext diabetic foot ulcer.     Plan:  No acute vascular intervention  F/u arterial duplex  For Right Lower Extremity angiogram on WED 11/20  plan discussed with attending

## 2019-11-16 NOTE — PROGRESS NOTE ADULT - SUBJECTIVE AND OBJECTIVE BOX
JOSÉ MANUEL PORTER  69y, Male    All available historical data reviewed    OVERNIGHT EVENTS:  no fvers    ROS:  General: Denies rigors, nightsweats  HEENT: Denies headache, rhinorrhea, sore throat, eye pain  CV: Denies CP, palpitations  PULM: Denies wheezing, hemoptysis  GI: Denies hematemesis, hematochezia, melena  : Denies discharge, hematuria  MSK: Denies arthralgias, myalgias, no pain right foot  SKIN: Denies rash, lesions  NEURO: Denies paresthesias, weakness  PSYCH: Denies depression, anxiety    VITALS:  T(F): 97, Max: 99 (11-15- @ 12:52)  HR: 89  BP: 166/86  RR: 18Vital Signs Last 24 Hrs  T(C): 36.1 (2019 04:54), Max: 37.2 (15 Nov 2019 12:52)  T(F): 97 (2019 04:54), Max: 99 (15 Nov 2019 12:52)  HR: 89 (2019 04:54) (89 - 97)  BP: 166/86 (2019 04:59) (116/56 - 166/86)  BP(mean): --  RR: 18 (2019 04:59) (18 - 18)  SpO2: 94% (2019 08:18) (93% - 94%)    TESTS & MEASUREMENTS:                        10.0   9.90  )-----------( 230      ( 2019 08:34 )             29.8         139  |  101  |  11  ----------------------------<  226<H>  4.4   |  27  |  0.9    Ca    8.3<L>      2019 08:34  Mg     1.7         TPro  5.6<L>  /  Alb  3.2<L>  /  TBili  0.7  /  DBili  x   /  AST  15  /  ALT  22  /  AlkPhos  93      LIVER FUNCTIONS - ( 2019 08:34 )  Alb: 3.2 g/dL / Pro: 5.6 g/dL / ALK PHOS: 93 U/L / ALT: 22 U/L / AST: 15 U/L / GGT: x             Culture - Other (collected 19 @ 20:40)  Source: .Other Right Foot Wound  Preliminary Report (19 @ 07:00):    Normal skin devendra isolated    Culture - Blood (collected 19 @ 17:03)  Source: .Blood Blood  Preliminary Report (11-15-19 @ 23:02):    No growth to date.    Culture - Blood (collected 19 @ 17:03)  Source: .Blood Blood  Preliminary Report (11-15-19 @ 23:02):    No growth to date.      Urinalysis Basic - ( 15 Nov 2019 10:20 )    Color: Yellow / Appearance: Slightly Turbid / S.022 / pH: x  Gluc: x / Ketone: Trace  / Bili: Negative / Urobili: <2 mg/dL   Blood: x / Protein: 30 mg/dL / Nitrite: Negative   Leuk Esterase: Negative / RBC: 3 /HPF / WBC 3 /HPF   Sq Epi: x / Non Sq Epi: 1 /HPF / Bacteria: Negative          RADIOLOGY & ADDITIONAL TESTS:  Personal review of radiological diagnostics performed  Echo and EKG results noted when applicable.     ANTIBIOTICS:  cefepime   IVPB 2000 milliGRAM(s) IV Intermittent every 8 hours  metroNIDAZOLE  IVPB      metroNIDAZOLE  IVPB 500 milliGRAM(s) IV Intermittent every 8 hours  vancomycin  IVPB 1250 milliGRAM(s) IV Intermittent every 12 hours

## 2019-11-16 NOTE — PROGRESS NOTE ADULT - SUBJECTIVE AND OBJECTIVE BOX
PROGRESS NOTE   Pt seen @ bedside during AM rounds. Dressing +Clean, +Dry, + Intact      Vital Signs Last 24 Hrs  T(C): 36.1 (16 Nov 2019 04:54), Max: 37.2 (15 Nov 2019 12:52)  T(F): 97 (16 Nov 2019 04:54), Max: 99 (15 Nov 2019 12:52)  HR: 89 (16 Nov 2019 04:54) (89 - 97)  BP: 166/86 (16 Nov 2019 04:59) (116/56 - 166/86)  BP(mean): --  RR: 18 (16 Nov 2019 04:59) (18 - 18)  SpO2: 93% (16 Nov 2019 01:45) (93% - 98%)                          10.4   10.84 )-----------( 204      ( 15 Nov 2019 06:00 )             32.2               11-15    138  |  100  |  11  ----------------------------<  252<H>  3.8   |  25  |  1.0    Ca    8.7      15 Nov 2019 06:00  Mg     1.5     11-15    TPro  6.8  /  Alb  4.0  /  TBili  0.9  /  DBili  x   /  AST  41  /  ALT  44<H>  /  AlkPhos  112  11-14      Derm:   Plantar Ulcer Right Foot Between Submet 2 and 3  Macerated Periwound w/ Fibrotic Tissue   Wound probes Deep to Capsule and Possibly Bone  Mild Erythema and Edema w/ Drainage w/ Mild Odor    Vascular:   Non-Palpable Pulses on the Right Foot   Foot was warm to warm to the touch   Capillary Refill < 3 Seconds     Neuro: Protective Sensation Mildly Intact     < from: Xray Foot AP + Lateral + Oblique, Right (11.15.19 @ 08:32) >    EXAM:  XR FOOT COMP MIN 3 VIEWS RT            PROCEDURE DATE:  11/15/2019            INTERPRETATION:  Clinical History / Reason for exam: New ulcer plantar to   the second metatarsophalangeal joint.    3 views of the right foot.    Comparison: September 12, 2019    Findings:    Again seen is patient post transmetatarsal amputation of the first ray,   with heterotopic bone formation at the amputation stump, unchanged, and a   few surgical clips at the amputation stump.    Soft tissue abnormalityat the plantar forefoot region on the lateral   view is consistent with with given history of ulcer.    There is no definite radiographic evidence of osteomyelitis. Degenerative   changes of the second and third metatarsophalangeal joints are noted.    Degenerative changes of the tarsometatarsal joints and talonavicular   joint are again noted.    There are vascular calcifications.    Impression:    Plantar ulcer at the forefoot region, without definite radiographic   evidence of osteomyelitis. Consider MRI of the right forefoot for further   evaluation.    Degenerative changes of the second and third MTP joints, and   tarsometatarsal, talonavicular joints.        VIVIANE LEE M.D., ATTENDING RADIOLOGIST  This document has been electronically signed. Nov 15 2019 12:59PM           < end of copied text >      Assessment:   Right Foot DFU  Pressure Ulcer Probes to Capsule / Bone  Diabetic    Plan:  Chart reviewed and Patient evaluated.  Discussed diagnosis and treatment with patient  Wound Flushed w/ Peroxide and Irrigated w/ Normal Saline; Applied Betadine Soaked Gauze / DSD / Kerlix   Xray reviewed as above  Pending MRI read  Depending on the results - 6 weeks of antibiotics, total contact cast and hyperbaric therapy vs surgical resection .   Cont IV Abx; As Per ID

## 2019-11-16 NOTE — PROGRESS NOTE ADULT - ASSESSMENT
ASSESSMENT  68 yo M with PMH of insulin-dependent diabetes mellitus , Diabetic foot ulcer s/p R great toe amputation and Partial Hallux Amputation Left Foot admitted for DFU    IMPRESSION  #R plantar DFU after stepping on a screw 3 weeks ago, r/o OM     xray Soft tissue irregularity in the forefoot consistent with given history of open wound. No radiographic evidence of osteomyelitis.    Sedimentation Rate, Erythrocyte: 49 mm/Hr (11.14.19 @ 17:03) <-- 61 8/2019    CRP 15    8/2019 tissue cx ecoli/kleb panS  #Tm 100.7 P>90 WBC 12.89, sepsis ruled out  #Obesity BMI (kg/m2): 30.1  #DM Hemoglobin A1C, Whole Blood: 11.2 (12-27-18 @ 06:07)    RECOMMENDATIONS  - MRI r/o OM  - f/u WCX, BCX  - ADD flagyl 500mg q8h IV for anaerobic coverage  - Cefepime 2g q8h IV  - Vanc 1.25 q12h IV   - Please check vanc trough 30 min prior to 4th dose

## 2019-11-16 NOTE — PROGRESS NOTE ADULT - SUBJECTIVE AND OBJECTIVE BOX
CHARLOTTE JOSÉ MANUEL  69y  Male  ***My note supersedes ALL resident notes that I sign.  My corrections for their notes are in my note.***    I can be reached directly on [id number]. My office number is [phone]. My personal cell number is [phone].    INTERVAL EVENTS: Here for f/u of DFU. Pt doing well. No pain. No complaints.    T(F): 97 (19 @ 04:54), Max: 99 (11-15-19 @ 12:52)  HR: 89 (19 @ 04:54) (89 - 97)  BP: 166/86 (19 @ 04:59) (116/56 - 166/86)  RR: 18 (19 @ 04:59) (18 - 18)  SpO2: 94% (19 @ 08:18) (93% - 94%)    Gen: NAD  HEENT: WNL  neck: no JVD; no nodes; thyroid nl  lungs: clr  hrt: s1 s2 rrr  abd soft NT/ND  ext: no edema, no c/c; rt foot in dressing; rt leg not warm; no asc erythema    LABS:                        10.0    (    87.6   9.90  )-----------( ---------      230      ( 2019 08:34 )             29.8    (    12.9     139   (   101   (   226      19 @ 08:34  ----------------------               4.4   (   27   (   11                             -----                        0.9  Ca  8.3   Mg  1.7    P   --     LFT  5.6  (  0.7  (  15       19 @ 08:34  -------------------------  3.2  (  93  (  22    PT/INR - ( 2019 08:34 )   PT: 16.60 sec;   INR: 1.45 ratio      Urinalysis Basic - ( 15 Nov 2019 10:20 )    Color: Yellow / Appearance: Slightly Turbid / S.022 / pH: x  Gluc: x / Ketone: Trace  / Bili: Negative / Urobili: <2 mg/dL   Blood: x / Protein: 30 mg/dL / Nitrite: Negative   Leuk Esterase: Negative / RBC: 3 /HPF / WBC 3 /HPF   Sq Epi: x / Non Sq Epi: 1 /HPF / Bacteria: Negative    CAPILLARY BLOOD GLUCOSE  POCT Blood Glucose.: 72 (19 @ 11:14)  POCT Blood Glucose.: 211 (19 @ 07:46)  POCT Blood Glucose.: 261 (19 @ 02:01)  POCT Blood Glucose.: 221 (11-15-19 @ 23:18)  POCT Blood Glucose.: 179 (11-15-19 @ 16:58)  POCT Blood Glucose.: 65 (11-15-19 @ 11:51)  POCT Blood Glucose.: 131 (11-15-19 @ 10:12)  POCT Blood Glucose.: 252 (11-15-19 @ 07:49)    Culture - Other (collected 19 @ 20:40)  Source: .Other Right Foot Wound  Preliminary Report (19 @ 07:00):    Normal skin devendra isolated    Culture - Blood (collected 19 @ 17:03)  Source: .Blood Blood  Preliminary Report (11-15-19 @ 23:02):    No growth to date.    Culture - Blood (collected 19 @ 17:03)  Source: .Blood Blood  Preliminary Report (11-15-19 @ 23:02):    No growth to date.    RADIOLOGY & ADDITIONAL TESTS:      MEDICATIONS:  cefepime   IVPB 2000 milliGRAM(s) IV Intermittent every 8 hours  metroNIDAZOLE  IVPB      metroNIDAZOLE  IVPB 500 milliGRAM(s) IV Intermittent every 8 hours  vancomycin  IVPB 1250 milliGRAM(s) IV Intermittent every 12 hours    aspirin enteric coated 81 milliGRAM(s) Oral daily  atorvastatin 20 milliGRAM(s) Oral at bedtime  chlorhexidine 4% Liquid 1 Application(s) Topical <User Schedule>  enoxaparin Injectable 40 milliGRAM(s) SubCutaneous daily  insulin glargine Injectable (LANTUS) 45 Unit(s) SubCutaneous at bedtime  insulin lispro (HumaLOG) corrective regimen sliding scale   SubCutaneous three times a day before meals  insulin lispro Injectable (HumaLOG) 15 Unit(s) SubCutaneous three times a day before meals  lisinopril 40 milliGRAM(s) Oral daily  magnesium oxide 400 milliGRAM(s) Oral daily  NIFEdipine XL 60 milliGRAM(s) Oral daily  pantoprazole    Tablet 40 milliGRAM(s) Oral before breakfast  sertraline 50 milliGRAM(s) Oral daily

## 2019-11-17 LAB
ALBUMIN SERPL ELPH-MCNC: 3 G/DL — LOW (ref 3.5–5.2)
ALP SERPL-CCNC: 94 U/L — SIGNIFICANT CHANGE UP (ref 30–115)
ALT FLD-CCNC: 17 U/L — SIGNIFICANT CHANGE UP (ref 0–41)
ANION GAP SERPL CALC-SCNC: 13 MMOL/L — SIGNIFICANT CHANGE UP (ref 7–14)
AST SERPL-CCNC: 14 U/L — SIGNIFICANT CHANGE UP (ref 0–41)
BASOPHILS # BLD AUTO: 0.02 K/UL — SIGNIFICANT CHANGE UP (ref 0–0.2)
BASOPHILS NFR BLD AUTO: 0.2 % — SIGNIFICANT CHANGE UP (ref 0–1)
BILIRUB SERPL-MCNC: 0.5 MG/DL — SIGNIFICANT CHANGE UP (ref 0.2–1.2)
BUN SERPL-MCNC: 14 MG/DL — SIGNIFICANT CHANGE UP (ref 10–20)
CALCIUM SERPL-MCNC: 8.5 MG/DL — SIGNIFICANT CHANGE UP (ref 8.5–10.1)
CHLORIDE SERPL-SCNC: 102 MMOL/L — SIGNIFICANT CHANGE UP (ref 98–110)
CO2 SERPL-SCNC: 25 MMOL/L — SIGNIFICANT CHANGE UP (ref 17–32)
CREAT SERPL-MCNC: 1.2 MG/DL — SIGNIFICANT CHANGE UP (ref 0.7–1.5)
EOSINOPHIL # BLD AUTO: 0.13 K/UL — SIGNIFICANT CHANGE UP (ref 0–0.7)
EOSINOPHIL NFR BLD AUTO: 1.3 % — SIGNIFICANT CHANGE UP (ref 0–8)
GLUCOSE BLDC GLUCOMTR-MCNC: 138 MG/DL — HIGH (ref 70–99)
GLUCOSE BLDC GLUCOMTR-MCNC: 138 MG/DL — HIGH (ref 70–99)
GLUCOSE BLDC GLUCOMTR-MCNC: 155 MG/DL — HIGH (ref 70–99)
GLUCOSE BLDC GLUCOMTR-MCNC: 71 MG/DL — SIGNIFICANT CHANGE UP (ref 70–99)
GLUCOSE SERPL-MCNC: 127 MG/DL — HIGH (ref 70–99)
HCT VFR BLD CALC: 30.9 % — LOW (ref 42–52)
HGB BLD-MCNC: 10.1 G/DL — LOW (ref 14–18)
IMM GRANULOCYTES NFR BLD AUTO: 0.7 % — HIGH (ref 0.1–0.3)
INR BLD: 1.43 RATIO — HIGH (ref 0.65–1.3)
LYMPHOCYTES # BLD AUTO: 0.73 K/UL — LOW (ref 1.2–3.4)
LYMPHOCYTES # BLD AUTO: 7 % — LOW (ref 20.5–51.1)
MAGNESIUM SERPL-MCNC: 1.7 MG/DL — LOW (ref 1.8–2.4)
MCHC RBC-ENTMCNC: 28.5 PG — SIGNIFICANT CHANGE UP (ref 27–31)
MCHC RBC-ENTMCNC: 32.7 G/DL — SIGNIFICANT CHANGE UP (ref 32–37)
MCV RBC AUTO: 87 FL — SIGNIFICANT CHANGE UP (ref 80–94)
MONOCYTES # BLD AUTO: 0.92 K/UL — HIGH (ref 0.1–0.6)
MONOCYTES NFR BLD AUTO: 8.8 % — SIGNIFICANT CHANGE UP (ref 1.7–9.3)
NEUTROPHILS # BLD AUTO: 8.53 K/UL — HIGH (ref 1.4–6.5)
NEUTROPHILS NFR BLD AUTO: 82 % — HIGH (ref 42.2–75.2)
NRBC # BLD: 0 /100 WBCS — SIGNIFICANT CHANGE UP (ref 0–0)
PLATELET # BLD AUTO: 263 K/UL — SIGNIFICANT CHANGE UP (ref 130–400)
POTASSIUM SERPL-MCNC: 4.2 MMOL/L — SIGNIFICANT CHANGE UP (ref 3.5–5)
POTASSIUM SERPL-SCNC: 4.2 MMOL/L — SIGNIFICANT CHANGE UP (ref 3.5–5)
PROT SERPL-MCNC: 5.4 G/DL — LOW (ref 6–8)
PROTHROM AB SERPL-ACNC: 16.4 SEC — HIGH (ref 9.95–12.87)
RBC # BLD: 3.55 M/UL — LOW (ref 4.7–6.1)
RBC # FLD: 12.9 % — SIGNIFICANT CHANGE UP (ref 11.5–14.5)
SODIUM SERPL-SCNC: 140 MMOL/L — SIGNIFICANT CHANGE UP (ref 135–146)
WBC # BLD: 10.4 K/UL — SIGNIFICANT CHANGE UP (ref 4.8–10.8)
WBC # FLD AUTO: 10.4 K/UL — SIGNIFICANT CHANGE UP (ref 4.8–10.8)

## 2019-11-17 PROCEDURE — 99232 SBSQ HOSP IP/OBS MODERATE 35: CPT

## 2019-11-17 RX ORDER — VANCOMYCIN HCL 1 G
1250 VIAL (EA) INTRAVENOUS EVERY 12 HOURS
Refills: 0 | Status: DISCONTINUED | OUTPATIENT
Start: 2019-11-17 | End: 2019-11-18

## 2019-11-17 RX ORDER — INSULIN LISPRO 100/ML
13 VIAL (ML) SUBCUTANEOUS
Refills: 0 | Status: DISCONTINUED | OUTPATIENT
Start: 2019-11-17 | End: 2019-11-20

## 2019-11-17 RX ORDER — VANCOMYCIN HCL 1 G
1500 VIAL (EA) INTRAVENOUS EVERY 12 HOURS
Refills: 0 | Status: DISCONTINUED | OUTPATIENT
Start: 2019-11-17 | End: 2019-11-17

## 2019-11-17 RX ORDER — LACTULOSE 10 G/15ML
20 SOLUTION ORAL ONCE
Refills: 0 | Status: COMPLETED | OUTPATIENT
Start: 2019-11-17 | End: 2019-11-17

## 2019-11-17 RX ADMIN — CEFEPIME 100 MILLIGRAM(S): 1 INJECTION, POWDER, FOR SOLUTION INTRAMUSCULAR; INTRAVENOUS at 05:40

## 2019-11-17 RX ADMIN — Medication 13 UNIT(S): at 17:38

## 2019-11-17 RX ADMIN — Medication 60 MILLIGRAM(S): at 05:41

## 2019-11-17 RX ADMIN — INSULIN GLARGINE 45 UNIT(S): 100 INJECTION, SOLUTION SUBCUTANEOUS at 21:22

## 2019-11-17 RX ADMIN — Medication 81 MILLIGRAM(S): at 12:30

## 2019-11-17 RX ADMIN — ATORVASTATIN CALCIUM 20 MILLIGRAM(S): 80 TABLET, FILM COATED ORAL at 21:21

## 2019-11-17 RX ADMIN — Medication 100 MILLIGRAM(S): at 13:13

## 2019-11-17 RX ADMIN — Medication 100 MILLIGRAM(S): at 05:40

## 2019-11-17 RX ADMIN — Medication 166.67 MILLIGRAM(S): at 17:38

## 2019-11-17 RX ADMIN — LISINOPRIL 40 MILLIGRAM(S): 2.5 TABLET ORAL at 05:41

## 2019-11-17 RX ADMIN — LACTULOSE 20 GRAM(S): 10 SOLUTION ORAL at 12:30

## 2019-11-17 RX ADMIN — Medication 100 MILLIGRAM(S): at 21:23

## 2019-11-17 RX ADMIN — Medication 166.67 MILLIGRAM(S): at 05:41

## 2019-11-17 RX ADMIN — MAGNESIUM OXIDE 400 MG ORAL TABLET 400 MILLIGRAM(S): 241.3 TABLET ORAL at 12:30

## 2019-11-17 RX ADMIN — ENOXAPARIN SODIUM 40 MILLIGRAM(S): 100 INJECTION SUBCUTANEOUS at 12:30

## 2019-11-17 RX ADMIN — CEFEPIME 100 MILLIGRAM(S): 1 INJECTION, POWDER, FOR SOLUTION INTRAMUSCULAR; INTRAVENOUS at 13:13

## 2019-11-17 RX ADMIN — PANTOPRAZOLE SODIUM 40 MILLIGRAM(S): 20 TABLET, DELAYED RELEASE ORAL at 07:04

## 2019-11-17 RX ADMIN — SERTRALINE 50 MILLIGRAM(S): 25 TABLET, FILM COATED ORAL at 12:30

## 2019-11-17 RX ADMIN — CHLORHEXIDINE GLUCONATE 1 APPLICATION(S): 213 SOLUTION TOPICAL at 05:40

## 2019-11-17 RX ADMIN — Medication 15 UNIT(S): at 08:31

## 2019-11-17 RX ADMIN — CEFEPIME 100 MILLIGRAM(S): 1 INJECTION, POWDER, FOR SOLUTION INTRAMUSCULAR; INTRAVENOUS at 21:23

## 2019-11-17 NOTE — PROGRESS NOTE ADULT - SUBJECTIVE AND OBJECTIVE BOX
JOSÉ MANUEL PORTER  69y  Male  ***My note supersedes ALL resident notes that I sign.  My corrections for their notes are in my note.***    I can be reached directly on [employer] 1908. My office number is [phone]. My personal cell number is [phone].    INTERVAL EVENTS: Here for f/u of DFU. Pt doing well. No complaints.    T(F): 96.9 (19 @ 05:00), Max: 97.7 (19 @ 20:00)  HR: 86 (19 @ 05:00) (82 - 90)  BP: 136/62 (19 @ 05:00) (115/57 - 136/62)  RR: 18 (19 @ 05:00) (18 - 18)  SpO2: 94% (19 @ 08:00) (94% - 94%)    Gen: NAD  HEENT: WNL  neck: no JVD; no nodes; thyroid nl  lungs: clr  hrt: s1 s2 rrr  abd soft NT/ND  ext: no edema, no c/c; rt foot in dressing; rt leg not warm; no asc erythema    LABS:                        10.1    (    87.0   10.40 )-----------( ---------      263      ( 2019 07:45 )             30.9    (    12.9     140   (   102   (   127      19 @ 07:45  ----------------------               4.2   (   25   (   14                             -----                        1.2  Ca  8.5   Mg  1.7    P   --     LFT  5.4  (  0.5  (  14       19 @ 07:45  -------------------------  3.0  (  94  (  17    PT/INR - ( 2019 07:45 )   PT: 16.40 sec;   INR: 1.43 ratio      Urinalysis Basic - ( 15 Nov 2019 10:20 )    Color: Yellow / Appearance: Slightly Turbid / S.022 / pH: x  Gluc: x / Ketone: Trace  / Bili: Negative / Urobili: <2 mg/dL   Blood: x / Protein: 30 mg/dL / Nitrite: Negative   Leuk Esterase: Negative / RBC: 3 /HPF / WBC 3 /HPF   Sq Epi: x / Non Sq Epi: 1 /HPF / Bacteria: Negative    CAPILLARY BLOOD GLUCOSE  POCT Blood Glucose.: 138 (19 @ 07:53)  POCT Blood Glucose.: 178 (19 @ 21:50)  POCT Blood Glucose.: 199 (19 @ 16:46)  POCT Blood Glucose.: 72 (19 @ 11:14)  POCT Blood Glucose.: 211 (19 @ 07:46)  POCT Blood Glucose.: 261 (19 @ 02:01)  POCT Blood Glucose.: 221 (11-15-19 @ 23:18)  POCT Blood Glucose.: 179 (11-15-19 @ 16:58)    Culture - Urine (collected 11-15-19 @ 10:20)  Source: .Urine Clean Catch (Midstream)  Final Report (19 @ 16:39):    No growth    Culture - Other (collected 19 @ 20:40)  Source: .Other Right Foot Wound  Preliminary Report (19 @ 07:00):    Normal skin devendra isolated    Culture - Blood (collected 19 @ 17:03)  Source: .Blood Blood  Preliminary Report (11-15-19 @ 23:02):    No growth to date.    Culture - Blood (collected 19 @ 17:03)  Source: .Blood Blood  Preliminary Report (11-15-19 @ 23:02):    No growth to date.    RADIOLOGY & ADDITIONAL TESTS:      MEDICATIONS:  cefepime   IVPB 2000 milliGRAM(s) IV Intermittent every 8 hours  metroNIDAZOLE  IVPB      metroNIDAZOLE  IVPB 500 milliGRAM(s) IV Intermittent every 8 hours  vancomycin  IVPB 1500 milliGRAM(s) IV Intermittent every 12 hours    aspirin enteric coated 81 milliGRAM(s) Oral daily  atorvastatin 20 milliGRAM(s) Oral at bedtime  chlorhexidine 4% Liquid 1 Application(s) Topical <User Schedule>  enoxaparin Injectable 40 milliGRAM(s) SubCutaneous daily  insulin glargine Injectable (LANTUS) 45 Unit(s) SubCutaneous at bedtime  insulin lispro (HumaLOG) corrective regimen sliding scale   SubCutaneous three times a day before meals  insulin lispro Injectable (HumaLOG) 15 Unit(s) SubCutaneous three times a day before meals  lisinopril 40 milliGRAM(s) Oral daily  magnesium oxide 400 milliGRAM(s) Oral daily  NIFEdipine XL 60 milliGRAM(s) Oral daily  pantoprazole    Tablet 40 milliGRAM(s) Oral before breakfast  sertraline 50 milliGRAM(s) Oral daily

## 2019-11-17 NOTE — PROGRESS NOTE ADULT - ASSESSMENT
68 yo M with PMH of insulin-dependent diabetes mellitus , Diabetic foot ulcer s/p R great toe amputation and Partial Hallux Amputation Left Foot was sent to the ED from podiatry office Dr. Bhatia for worsening right lower ext diabetic foot ulcer. In the ED he was febrile to 100.7 and tachycardic to 109. Labs revealed leukocytosis to 12.89 and elevated inflammatory markers with ESR 49 and CRP 15.52.    # Sepsis on admission secondary to RIGHT diabetic planter foot ulcer s/p trauma by screw:  - Leukocytosis resolved now  - Prelim Wound Cx growing rare staph aureus with normal skin devendra > f/u final report  - Wound culture from left foot: 8/13/2019: Escherichia coli sensitive to cefepime, Klebsiella varicola sensitive to cefepime, Enterococcus faecalis sensitive to Unasyn and vanco  - Blood Cx negative  - MRI report from 11/15 reveals metatarsal osteomyelitis and septic arthritis  - podiatry is following the patient, will decide Sx vs Abx based on imaging (patient wishes to avoid Sx)  - C/w cefepime, vancomycin and flagyl as per ID  - Vanc trough 14.1 > continue with same dose  - Patient received tetanus shot on previous admission    # PVD  - Follows vascular as Outpt, Was recommended angiogram as Outpt after previous d/c in Aug.   - EKG and CXR preformed for clearance  - Duplex repeated 11/15 > moderate right tibial disease with no evidence of hemodynamically significant disease in LLE  - RLE angiogram scheduled for Wednesday 11/20    # Constipation  - Patient has not passed stool since Thursday  - Lactulose PO ordered stat > F/u response    # Normocytic Anemia  - Likely Anemia of Chronic Disease  - Hb 10.1 today  - F/u AM Iron Studies    # Insulin-dependent diabetes mellitus:  - On Lantus 45 and Lispro 13 along with sliding scale  - I reduced dose based on low glucose reading  - F/u FS    # HTN urgency:  - c/w Lisinopril and Nifedipine    # DLD:  - c/w statin    DVT ppx: Lovenox  GI: PPI   CHO consistent  dispo: from home

## 2019-11-17 NOTE — PROGRESS NOTE ADULT - ASSESSMENT
68 yo M with PMH of insulin-dependent diabetes mellitus, Diabetic foot ulcer s/p R great toe amputation and Partial Hallux Amputation Left Foot was sent to the ED from podiatry office Dr. Bhatia for worsening right lower ext diabetic foot ulcer.     # right diabetic planter foot ulcer after trauma by screw; no pain  sepsis criteria were met on admission (WBC 12.9, , RR 20, T 100.7) (I disagree w/ initial ID attd note)  sepsis now resolved  ESR 49 , CRP 15.5  ID noted  abx vanco/cefepime/flagyl; vanco tr 14.1 - no change in dose  Wound Culture - nl devendra  - wound culture from left foot: 8/13/2019: Escherichia coli sensitive to cefepime, Klebsiella variicola sensitive to cefepime,  Enterococcus faecalis sensitive to Unasyn and vanco  xray noted - no OM; + degen changes  will discuss w/ team further plan after MRI (done) rt foot w/ gado is resulted   Pod noted  Wound Care (per pod team): Flush w/ Peroxide and Irrigated w/ Normal Saline; Applied Betadine Soaked Gauze / DSD / Kerlix / ACE q24  Pod might do contact casting; pt might need Darco shoe    # PVD  had + duplex last admit and on this admit for rt tib dz  vasc eval - for angio on Wed (11/20) (IM will do pre-op eval on Mon (11/18)) - f/u new ECG (ordered)  cont asa 81mg po q24    # insulin-dependent diabetes mellitus T2; not controlled - A1c 9.4  FS qac/HS and keep btw 100-180 - control is better  c/w Lantus 45 and lisp 15/meal w/ +2 CF scale  diabetic diet    # hypomagnesemia  c/w Mg Ox 400mg po q24  recheck lvl in few days    # HTN: controlled  c/w ACE, procardia    # DLD: c/w statin    # depression  on SSRI    # normo an of chr dz  no further inpt w/u    # DVT ppx: Lovenox    # GI: PPI     dispo: f/u results for MRI rt foot; f/u w/ POD, ID; f/u VASC for angio Wed; need pre-op eval on Mon;

## 2019-11-17 NOTE — PROGRESS NOTE ADULT - SUBJECTIVE AND OBJECTIVE BOX
Patient Information:  JOSÉ MANUEL PROTER / 69y / Male / MRN#:[id number]    Hospital Day: 3d    HPI:  68 yo M with PMH of insulin-dependent diabetes mellitus , Diabetic foot ulcer s/p R great toe amputation and Partial Hallux Amputation Left Foot was sent to the ED from podiatry office Dr. Bhatia for worsening right lower ext diabetic foot ulcer. patient reports he had pain and feeling warm for only 1 day. he went to his podiatrist for follow up who upon evaluation referred the patient to the ED. patient also reports he stepped over a screw 3 weeks ago. denies discharge, fever , chills, nausea, vomiting or change in bowel or urinary habits.     In ED pt had tachycardia 109, febrile Tmax 100.7, /89.  patient received 3 L LR , zosyn and vanco.     Attd: here for eval of rt foot ulcer, which is getting larger in size; pt developed this after stepping on screw 3 wks ago (got Td vac); pt says rt foot and lower leg were very hot; he developed fever; his pod sent him in for IV abx and further eval for sx; pt is otherwise fine; no CP or SOB    Interval History:  Patient seen and examined at bedside. No acute events overnight. No pain or fever. Complaining of constipation since Thursday.    Past Medical History:  GERD (gastroesophageal reflux disease)  Depression  Diabetic foot ulcer  Dyslipidemia  HTN (hypertension)  DM (diabetes mellitus)    Past Surgical History:  Toe amputation status, right    Allergies:  No Known Allergies    Medications:  PRN:  dextrose 40% Gel 15 Gram(s) Oral once PRN Blood Glucose LESS THAN 70 milliGRAM(s)/deciliter  glucagon  Injectable 1 milliGRAM(s) IntraMuscular once PRN Glucose LESS THAN 70 milligrams/deciliter    Standing:  aspirin enteric coated 81 milliGRAM(s) Oral daily  atorvastatin 20 milliGRAM(s) Oral at bedtime  cefepime   IVPB 2000 milliGRAM(s) IV Intermittent every 8 hours  chlorhexidine 4% Liquid 1 Application(s) Topical <User Schedule>  dextrose 5%. 1000 milliLiter(s) (50 mL/Hr) IV Continuous <Continuous>  dextrose 50% Injectable 12.5 Gram(s) IV Push once  dextrose 50% Injectable 25 Gram(s) IV Push once  dextrose 50% Injectable 25 Gram(s) IV Push once  enoxaparin Injectable 40 milliGRAM(s) SubCutaneous daily  insulin glargine Injectable (LANTUS) 45 Unit(s) SubCutaneous at bedtime  insulin lispro (HumaLOG) corrective regimen sliding scale   SubCutaneous three times a day before meals  insulin lispro Injectable (HumaLOG) 13 Unit(s) SubCutaneous three times a day before meals  lisinopril 40 milliGRAM(s) Oral daily  magnesium oxide 400 milliGRAM(s) Oral daily  metroNIDAZOLE  IVPB      metroNIDAZOLE  IVPB 500 milliGRAM(s) IV Intermittent every 8 hours  NIFEdipine XL 60 milliGRAM(s) Oral daily  pantoprazole    Tablet 40 milliGRAM(s) Oral before breakfast  sertraline 50 milliGRAM(s) Oral daily  vancomycin  IVPB 1500 milliGRAM(s) IV Intermittent every 12 hours    Home:  benazepril 40 mg oral tablet: 1 tab(s) orally once a day  Crestor 10 mg oral tablet: 1 tab(s) orally once a day (at bedtime)  HumuLIN 70/30 KwikPen 70 units-30 units/mL subcutaneous suspension: 50 unit(s) subcutaneous once a day before breakfast  HumuLIN 70/30 subcutaneous suspension: 40 unit(s) subcutaneous once a day (at bedtime)  Janumet 50 mg-1000 mg oral tablet: 1 tab(s) orally 2 times a day  omeprazole 20 mg oral delayed release capsule: 1 cap(s) orally once a day  sertraline 50 mg oral tablet: 1 tab(s) orally once a day    Vitals:  T(C): 35.9, Max: 36.5 (11-16-19 @ 20:00)  T(F): 96.6, Max: 97.7 (11-16-19 @ 20:00)  HR: 83 (83 - 90)  BP: 611/61 (133/63 - 611/61)  RR: 18 (18 - 18)  SpO2: 94% (94% - 94%)    Physical Exam:  General: Awake, Alert. Not in acute distress.  Heart: Regular rate and rhythm; S1, S2; No murmurs.  Lungs: Clear to auscultation bilaterally.  Abdomen: Soft, nontender, nondistended.  Extremities: Left partial Hallux amputation, Right foot dressed  Neuro: AAOx3, No focal deficits.    Labs:  CBC (11-17 @ 07:45)                        Hgb: 10.1   WBC: 10.40 )-----------------( Plts: 263                              Hct: 30.9     Chem (11-17 @ 07:45)  Na: 140  |     Cl: 102     |  BUN: 14  -----------------------------------------< Gluc: 127    K: 4.2   |    HCO3: 25  |  Cr: 1.2    Ca 8.5 (11-17 @ 07:45)  Mg 1.7 (11-17 @ 07:45)    LFTs (11-17 @ 07:45)  TPro 5.4  /  Alb 3.0  TBili 0.5  /  DBili     AST 14  /  ALT 17  /  AlkPhos 94    PT/INR (11-17 @ 07:45)  PT: 16.40; INR: 1.43   PTT:                    Microbiology:  Culture - Urine (collected 11-15 @ 10:20)  Source: .Urine Clean Catch (Midstream)  Final Report (11-16 @ 16:39):    No growth    Culture - Other (collected 11-14 @ 20:40)  Source: .Other Right Foot Wound  Preliminary Report (11-17 @ 11:32):    Rare Staphylococcus aureus    Normal skin devendra isolated    Culture - Blood (collected 11-14 @ 17:03)  Source: .Blood Blood  Preliminary Report (11-15 @ 23:02):    No growth to date.    Culture - Blood (collected 11-14 @ 17:03)  Source: .Blood Blood  Preliminary Report (11-15 @ 23:02):    No growth to date.        Radiology:  < from: MR Foot w/ IV Cont, Right (11.15.19 @ 22:49) >  IMPRESSION:  1. Medial and metatarsal fat pad ulcer with adjacent phlegmon/tissue   ischemia as detailed above  2.Third MTP septic arthritis, third metatarsal head and proximal   phalangeal base osteomyelitis  3. Second metatarsal neck osteomyelitis; second metatarsal head chronic   AVN  4. Third distal flexor tenosynovitis with enhancement, consistent with   septic arthritis    < end of copied text >    < from: VA Duplex Lower Extrem Arterial, Bilat (11.15.19 @ 16:11) >  Impression:    Moderate right tibial disease.    No evidence for hemodynamically significant disease in the left lower   extremity.    < end of copied text >

## 2019-11-18 LAB
-  AMPICILLIN/SULBACTAM: SIGNIFICANT CHANGE UP
-  CEFAZOLIN: SIGNIFICANT CHANGE UP
-  CLINDAMYCIN: SIGNIFICANT CHANGE UP
-  ERYTHROMYCIN: SIGNIFICANT CHANGE UP
-  GENTAMICIN: SIGNIFICANT CHANGE UP
-  OXACILLIN: SIGNIFICANT CHANGE UP
-  PENICILLIN: SIGNIFICANT CHANGE UP
-  RIFAMPIN: SIGNIFICANT CHANGE UP
-  TETRACYCLINE: SIGNIFICANT CHANGE UP
-  TRIMETHOPRIM/SULFAMETHOXAZOLE: SIGNIFICANT CHANGE UP
-  VANCOMYCIN: SIGNIFICANT CHANGE UP
ALBUMIN SERPL ELPH-MCNC: 3.1 G/DL — LOW (ref 3.5–5.2)
ALP SERPL-CCNC: 98 U/L — SIGNIFICANT CHANGE UP (ref 30–115)
ALT FLD-CCNC: 15 U/L — SIGNIFICANT CHANGE UP (ref 0–41)
ANION GAP SERPL CALC-SCNC: 15 MMOL/L — HIGH (ref 7–14)
AST SERPL-CCNC: 16 U/L — SIGNIFICANT CHANGE UP (ref 0–41)
BASOPHILS # BLD AUTO: 0.02 K/UL — SIGNIFICANT CHANGE UP (ref 0–0.2)
BASOPHILS NFR BLD AUTO: 0.2 % — SIGNIFICANT CHANGE UP (ref 0–1)
BILIRUB SERPL-MCNC: 0.8 MG/DL — SIGNIFICANT CHANGE UP (ref 0.2–1.2)
BUN SERPL-MCNC: 13 MG/DL — SIGNIFICANT CHANGE UP (ref 10–20)
CALCIUM SERPL-MCNC: 8.3 MG/DL — LOW (ref 8.5–10.1)
CHLORIDE SERPL-SCNC: 102 MMOL/L — SIGNIFICANT CHANGE UP (ref 98–110)
CO2 SERPL-SCNC: 22 MMOL/L — SIGNIFICANT CHANGE UP (ref 17–32)
CREAT SERPL-MCNC: 1.1 MG/DL — SIGNIFICANT CHANGE UP (ref 0.7–1.5)
CULTURE RESULTS: SIGNIFICANT CHANGE UP
EOSINOPHIL # BLD AUTO: 0.13 K/UL — SIGNIFICANT CHANGE UP (ref 0–0.7)
EOSINOPHIL NFR BLD AUTO: 1.2 % — SIGNIFICANT CHANGE UP (ref 0–8)
GLUCOSE BLDC GLUCOMTR-MCNC: 129 MG/DL — HIGH (ref 70–99)
GLUCOSE BLDC GLUCOMTR-MCNC: 137 MG/DL — HIGH (ref 70–99)
GLUCOSE BLDC GLUCOMTR-MCNC: 241 MG/DL — HIGH (ref 70–99)
GLUCOSE BLDC GLUCOMTR-MCNC: 92 MG/DL — SIGNIFICANT CHANGE UP (ref 70–99)
GLUCOSE SERPL-MCNC: 63 MG/DL — LOW (ref 70–99)
HCT VFR BLD CALC: 30.3 % — LOW (ref 42–52)
HGB BLD-MCNC: 10 G/DL — LOW (ref 14–18)
IMM GRANULOCYTES NFR BLD AUTO: 0.8 % — HIGH (ref 0.1–0.3)
INR BLD: 1.48 RATIO — HIGH (ref 0.65–1.3)
LYMPHOCYTES # BLD AUTO: 0.71 K/UL — LOW (ref 1.2–3.4)
LYMPHOCYTES # BLD AUTO: 6.5 % — LOW (ref 20.5–51.1)
MAGNESIUM SERPL-MCNC: 1.7 MG/DL — LOW (ref 1.8–2.4)
MCHC RBC-ENTMCNC: 28.7 PG — SIGNIFICANT CHANGE UP (ref 27–31)
MCHC RBC-ENTMCNC: 33 G/DL — SIGNIFICANT CHANGE UP (ref 32–37)
MCV RBC AUTO: 87.1 FL — SIGNIFICANT CHANGE UP (ref 80–94)
METHOD TYPE: SIGNIFICANT CHANGE UP
MONOCYTES # BLD AUTO: 1.13 K/UL — HIGH (ref 0.1–0.6)
MONOCYTES NFR BLD AUTO: 10.3 % — HIGH (ref 1.7–9.3)
NEUTROPHILS # BLD AUTO: 8.88 K/UL — HIGH (ref 1.4–6.5)
NEUTROPHILS NFR BLD AUTO: 81 % — HIGH (ref 42.2–75.2)
NRBC # BLD: 0 /100 WBCS — SIGNIFICANT CHANGE UP (ref 0–0)
ORGANISM # SPEC MICROSCOPIC CNT: SIGNIFICANT CHANGE UP
ORGANISM # SPEC MICROSCOPIC CNT: SIGNIFICANT CHANGE UP
PLATELET # BLD AUTO: 273 K/UL — SIGNIFICANT CHANGE UP (ref 130–400)
POTASSIUM SERPL-MCNC: 4.3 MMOL/L — SIGNIFICANT CHANGE UP (ref 3.5–5)
POTASSIUM SERPL-SCNC: 4.3 MMOL/L — SIGNIFICANT CHANGE UP (ref 3.5–5)
PROT SERPL-MCNC: 5.4 G/DL — LOW (ref 6–8)
PROTHROM AB SERPL-ACNC: 16.9 SEC — HIGH (ref 9.95–12.87)
RBC # BLD: 3.48 M/UL — LOW (ref 4.7–6.1)
RBC # FLD: 13.1 % — SIGNIFICANT CHANGE UP (ref 11.5–14.5)
SODIUM SERPL-SCNC: 139 MMOL/L — SIGNIFICANT CHANGE UP (ref 135–146)
SPECIMEN SOURCE: SIGNIFICANT CHANGE UP
WBC # BLD: 10.96 K/UL — HIGH (ref 4.8–10.8)
WBC # FLD AUTO: 10.96 K/UL — HIGH (ref 4.8–10.8)

## 2019-11-18 PROCEDURE — 99232 SBSQ HOSP IP/OBS MODERATE 35: CPT | Mod: 24

## 2019-11-18 PROCEDURE — 99232 SBSQ HOSP IP/OBS MODERATE 35: CPT

## 2019-11-18 RX ORDER — MAGNESIUM SULFATE 500 MG/ML
2 VIAL (ML) INJECTION ONCE
Refills: 0 | Status: COMPLETED | OUTPATIENT
Start: 2019-11-18 | End: 2019-11-18

## 2019-11-18 RX ORDER — METRONIDAZOLE 500 MG
500 TABLET ORAL EVERY 8 HOURS
Refills: 0 | Status: DISCONTINUED | OUTPATIENT
Start: 2019-11-18 | End: 2019-11-19

## 2019-11-18 RX ADMIN — Medication 100 MILLIGRAM(S): at 21:41

## 2019-11-18 RX ADMIN — Medication 81 MILLIGRAM(S): at 11:37

## 2019-11-18 RX ADMIN — Medication 2: at 16:44

## 2019-11-18 RX ADMIN — Medication 60 MILLIGRAM(S): at 05:07

## 2019-11-18 RX ADMIN — PANTOPRAZOLE SODIUM 40 MILLIGRAM(S): 20 TABLET, DELAYED RELEASE ORAL at 05:07

## 2019-11-18 RX ADMIN — Medication 50 GRAM(S): at 11:29

## 2019-11-18 RX ADMIN — LISINOPRIL 40 MILLIGRAM(S): 2.5 TABLET ORAL at 05:07

## 2019-11-18 RX ADMIN — CEFEPIME 100 MILLIGRAM(S): 1 INJECTION, POWDER, FOR SOLUTION INTRAMUSCULAR; INTRAVENOUS at 21:41

## 2019-11-18 RX ADMIN — Medication 13 UNIT(S): at 12:07

## 2019-11-18 RX ADMIN — Medication 166.67 MILLIGRAM(S): at 05:06

## 2019-11-18 RX ADMIN — Medication 100 MILLIGRAM(S): at 14:02

## 2019-11-18 RX ADMIN — MAGNESIUM OXIDE 400 MG ORAL TABLET 400 MILLIGRAM(S): 241.3 TABLET ORAL at 11:38

## 2019-11-18 RX ADMIN — INSULIN GLARGINE 45 UNIT(S): 100 INJECTION, SOLUTION SUBCUTANEOUS at 21:41

## 2019-11-18 RX ADMIN — ATORVASTATIN CALCIUM 20 MILLIGRAM(S): 80 TABLET, FILM COATED ORAL at 21:41

## 2019-11-18 RX ADMIN — CHLORHEXIDINE GLUCONATE 1 APPLICATION(S): 213 SOLUTION TOPICAL at 05:08

## 2019-11-18 RX ADMIN — CEFEPIME 100 MILLIGRAM(S): 1 INJECTION, POWDER, FOR SOLUTION INTRAMUSCULAR; INTRAVENOUS at 15:35

## 2019-11-18 RX ADMIN — Medication 13 UNIT(S): at 16:44

## 2019-11-18 RX ADMIN — ENOXAPARIN SODIUM 40 MILLIGRAM(S): 100 INJECTION SUBCUTANEOUS at 11:37

## 2019-11-18 RX ADMIN — SERTRALINE 50 MILLIGRAM(S): 25 TABLET, FILM COATED ORAL at 11:38

## 2019-11-18 RX ADMIN — Medication 100 MILLIGRAM(S): at 05:06

## 2019-11-18 RX ADMIN — CEFEPIME 100 MILLIGRAM(S): 1 INJECTION, POWDER, FOR SOLUTION INTRAMUSCULAR; INTRAVENOUS at 05:07

## 2019-11-18 NOTE — PROGRESS NOTE ADULT - SUBJECTIVE AND OBJECTIVE BOX
Patient Information:  JOSÉ MANUEL PORTER / 69y / Male / MRN#:[id number]    Hospital Day: 4d    HPI:  68 yo M with PMH of insulin-dependent diabetes mellitus , Diabetic foot ulcer s/p R great toe amputation and Partial Hallux Amputation Left Foot was sent to the ED from podiatry office Dr. Bhatia for worsening right lower ext diabetic foot ulcer. patient reports he had pain and feeling warm for only 1 day. he went to his podiatrist for follow up who upon evaluation referred the patient to the ED. patient also reports he stepped over a screw 3 weeks ago. denies discharge, fever , chills, nausea, vomiting or change in bowel or urinary habits.     In ED pt had tachycardia 109, febrile Tmax 100.7, /89.  patient received 3 L LR , zosyn and vanco.     Attd: here for eval of rt foot ulcer, which is getting larger in size; pt developed this after stepping on screw 3 wks ago (got Td vac); pt says rt foot and lower leg were very hot; he developed fever; his pod sent him in for IV abx and further eval for sx; pt is otherwise fine; no CP or SOB    Interval History:  Patient seen and examined at bedside. No acute events overnight.    Past Medical History:  GERD (gastroesophageal reflux disease)  Depression  Diabetic foot ulcer  Dyslipidemia  HTN (hypertension)  DM (diabetes mellitus)    Past Surgical History:  Toe amputation status, right    Allergies:  No Known Allergies    Medications:  PRN:  dextrose 40% Gel 15 Gram(s) Oral once PRN Blood Glucose LESS THAN 70 milliGRAM(s)/deciliter  glucagon  Injectable 1 milliGRAM(s) IntraMuscular once PRN Glucose LESS THAN 70 milligrams/deciliter    Standing:  aspirin enteric coated 81 milliGRAM(s) Oral daily  atorvastatin 20 milliGRAM(s) Oral at bedtime  cefepime   IVPB 2000 milliGRAM(s) IV Intermittent every 8 hours  chlorhexidine 4% Liquid 1 Application(s) Topical <User Schedule>  dextrose 5%. 1000 milliLiter(s) (50 mL/Hr) IV Continuous <Continuous>  dextrose 50% Injectable 12.5 Gram(s) IV Push once  dextrose 50% Injectable 25 Gram(s) IV Push once  dextrose 50% Injectable 25 Gram(s) IV Push once  enoxaparin Injectable 40 milliGRAM(s) SubCutaneous daily  insulin glargine Injectable (LANTUS) 45 Unit(s) SubCutaneous at bedtime  insulin lispro (HumaLOG) corrective regimen sliding scale   SubCutaneous three times a day before meals  insulin lispro Injectable (HumaLOG) 13 Unit(s) SubCutaneous three times a day before meals  lisinopril 40 milliGRAM(s) Oral daily  magnesium oxide 400 milliGRAM(s) Oral daily  metroNIDAZOLE  IVPB 500 milliGRAM(s) IV Intermittent every 8 hours  NIFEdipine XL 60 milliGRAM(s) Oral daily  pantoprazole    Tablet 40 milliGRAM(s) Oral before breakfast  sertraline 50 milliGRAM(s) Oral daily  vancomycin  IVPB 1250 milliGRAM(s) IV Intermittent every 12 hours    Home:  benazepril 40 mg oral tablet: 1 tab(s) orally once a day  Crestor 10 mg oral tablet: 1 tab(s) orally once a day (at bedtime)  HumuLIN 70/30 KwikPen 70 units-30 units/mL subcutaneous suspension: 50 unit(s) subcutaneous once a day before breakfast  HumuLIN 70/30 subcutaneous suspension: 40 unit(s) subcutaneous once a day (at bedtime)  Janumet 50 mg-1000 mg oral tablet: 1 tab(s) orally 2 times a day  omeprazole 20 mg oral delayed release capsule: 1 cap(s) orally once a day  sertraline 50 mg oral tablet: 1 tab(s) orally once a day    Vitals:  T(C): 35.6, Max: 38.2 (11-17-19 @ 20:00)  T(F): 96, Max: 100.8 (11-17-19 @ 20:00)  HR: 80 (80 - 91)  BP: 131/60 (126/60 - 611/61)  RR: 18 (18 - 18)  SpO2: --    Physical Exam:  General: Awake, Alert. Not in acute distress.  Heart: Regular rate and rhythm; S1, S2; No murmurs.  Lungs: Clear to auscultation bilaterally.  Abdomen: Soft, nontender, nondistended.  Extremities: No edema in upper or lower extremities.  Neuro: AAOx3, No focal deficits.    Labs:  CBC (11-17 @ 07:45)                        Hgb: 10.1   WBC: 10.40 )-----------------( Plts: 263                              Hct: 30.9     Chem (11-17 @ 07:45)  Na: 140  |     Cl: 102     |  BUN: 14  -----------------------------------------< Gluc: 127    K: 4.2   |    HCO3: 25  |  Cr: 1.2    Ca 8.5 (11-17 @ 07:45)  Mg 1.7 (11-17 @ 07:45)    LFTs (11-17 @ 07:45)  TPro 5.4  /  Alb 3.0  TBili 0.5  /  DBili     AST 14  /  ALT 17  /  AlkPhos 94    PT/INR (11-17 @ 07:45)  PT: 16.40; INR: 1.43   PTT:                    Microbiology:  Culture - Urine (collected 11-15 @ 10:20)  Source: .Urine Clean Catch (Midstream)  Final Report (11-16 @ 16:39):    No growth    Culture - Other (collected 11-14 @ 20:40)  Source: .Other Right Foot Wound  Preliminary Report (11-17 @ 11:32):    Rare Staphylococcus aureus    Normal skin devendra isolated    Culture - Blood (collected 11-14 @ 17:03)  Source: .Blood Blood  Preliminary Report (11-15 @ 23:02):    No growth to date.    Culture - Blood (collected 11-14 @ 17:03)  Source: .Blood Blood  Preliminary Report (11-15 @ 23:02):    No growth to date.        Radiology: Patient Information:  JOSÉ MANUEL PORTER / 69y / Male / MRN#:[id number]    Hospital Day: 4d    HPI:  68 yo M with PMH of insulin-dependent diabetes mellitus , Diabetic foot ulcer s/p R great toe amputation and Partial Hallux Amputation Left Foot was sent to the ED from podiatry office Dr. Bhatia for worsening right lower ext diabetic foot ulcer. patient reports he had pain and feeling warm for only 1 day. he went to his podiatrist for follow up who upon evaluation referred the patient to the ED. patient also reports he stepped over a screw 3 weeks ago. denies discharge, fever , chills, nausea, vomiting or change in bowel or urinary habits.     In ED pt had tachycardia 109, febrile Tmax 100.7, /89.  patient received 3 L LR , zosyn and vanco.     Attd: here for eval of rt foot ulcer, which is getting larger in size; pt developed this after stepping on screw 3 wks ago (got Td vac); pt says rt foot and lower leg were very hot; he developed fever; his pod sent him in for IV abx and further eval for sx; pt is otherwise fine; no CP or SOB    Interval History:  Patient seen and examined at bedside. No acute events overnight.    Past Medical History:  GERD (gastroesophageal reflux disease)  Depression  Diabetic foot ulcer  Dyslipidemia  HTN (hypertension)  DM (diabetes mellitus)    Past Surgical History:  Toe amputation status, right    Allergies:  No Known Allergies    Medications:  PRN:  dextrose 40% Gel 15 Gram(s) Oral once PRN Blood Glucose LESS THAN 70 milliGRAM(s)/deciliter  glucagon  Injectable 1 milliGRAM(s) IntraMuscular once PRN Glucose LESS THAN 70 milligrams/deciliter    Standing:  aspirin enteric coated 81 milliGRAM(s) Oral daily  atorvastatin 20 milliGRAM(s) Oral at bedtime  cefepime   IVPB 2000 milliGRAM(s) IV Intermittent every 8 hours  chlorhexidine 4% Liquid 1 Application(s) Topical <User Schedule>  dextrose 5%. 1000 milliLiter(s) (50 mL/Hr) IV Continuous <Continuous>  dextrose 50% Injectable 12.5 Gram(s) IV Push once  dextrose 50% Injectable 25 Gram(s) IV Push once  dextrose 50% Injectable 25 Gram(s) IV Push once  enoxaparin Injectable 40 milliGRAM(s) SubCutaneous daily  insulin glargine Injectable (LANTUS) 45 Unit(s) SubCutaneous at bedtime  insulin lispro (HumaLOG) corrective regimen sliding scale   SubCutaneous three times a day before meals  insulin lispro Injectable (HumaLOG) 13 Unit(s) SubCutaneous three times a day before meals  lisinopril 40 milliGRAM(s) Oral daily  magnesium oxide 400 milliGRAM(s) Oral daily  magnesium sulfate  IVPB 2 Gram(s) IV Intermittent once  metroNIDAZOLE  IVPB 500 milliGRAM(s) IV Intermittent every 8 hours  NIFEdipine XL 60 milliGRAM(s) Oral daily  pantoprazole    Tablet 40 milliGRAM(s) Oral before breakfast  sertraline 50 milliGRAM(s) Oral daily  vancomycin  IVPB 1250 milliGRAM(s) IV Intermittent every 12 hours    Home:  benazepril 40 mg oral tablet: 1 tab(s) orally once a day  Crestor 10 mg oral tablet: 1 tab(s) orally once a day (at bedtime)  HumuLIN 70/30 KwikPen 70 units-30 units/mL subcutaneous suspension: 50 unit(s) subcutaneous once a day before breakfast  HumuLIN 70/30 subcutaneous suspension: 40 unit(s) subcutaneous once a day (at bedtime)  Janumet 50 mg-1000 mg oral tablet: 1 tab(s) orally 2 times a day  omeprazole 20 mg oral delayed release capsule: 1 cap(s) orally once a day  sertraline 50 mg oral tablet: 1 tab(s) orally once a day    Vitals:  T(C): 35.6, Max: 38.2 (11-17-19 @ 20:00)  T(F): 96, Max: 100.8 (11-17-19 @ 20:00)  HR: 80 (80 - 91)  BP: 131/60 (126/60 - 611/61)  RR: 18 (18 - 18)  SpO2: --    Physical Exam:  General: Awake, Alert. Not in acute distress.  Heart: Regular rate and rhythm; S1, S2; No murmurs.  Lungs: Clear to auscultation bilaterally.  Abdomen: Soft, nontender, nondistended.  Extremities: Left partial Hallux amputation, Right foot dressed  Neuro: AAOx3, No focal deficits.    Labs:  CBC (11-18 @ 06:50)                        Hgb: 10.0   WBC: 10.96 )-----------------( Plts: 273                              Hct: 30.3     Chem (11-18 @ 06:50)  Na: 139  |     Cl: 102     |  BUN: 13  -----------------------------------------< Gluc: 63    K: 4.3   |    HCO3: 22  |  Cr: 1.1    Ca 8.3 (11-18 @ 06:50)  Mg 1.7 (11-18 @ 06:50)    LFTs (11-18 @ 06:50)  TPro 5.4  /  Alb 3.1  TBili 0.8  /  DBili     AST 16  /  ALT 15  /  AlkPhos 98    PT/INR (11-18 @ 06:50)  PT: 16.90; INR: 1.48   PTT:                    Microbiology:  Culture - Urine (collected 11-15 @ 10:20)  Source: .Urine Clean Catch (Midstream)  Final Report (11-16 @ 16:39):    No growth    Culture - Other (collected 11-14 @ 20:40)  Source: .Other Right Foot Wound  Preliminary Report (11-17 @ 11:32):    Rare Staphylococcus aureus    Normal skin devendra isolated    Culture - Blood (collected 11-14 @ 17:03)  Source: .Blood Blood  Preliminary Report (11-15 @ 23:02):    No growth to date.    Culture - Blood (collected 11-14 @ 17:03)  Source: .Blood Blood  Preliminary Report (11-15 @ 23:02):    No growth to date.        Radiology: Patient Information:  JOSÉ MANUEL PORTER / 69y / Male / MRN#:[id number]    Hospital Day: 4d    HPI:  68 yo M with PMH of insulin-dependent diabetes mellitus , Diabetic foot ulcer s/p R great toe amputation and Partial Hallux Amputation Left Foot was sent to the ED from podiatry office Dr. Bhatia for worsening right lower ext diabetic foot ulcer. patient reports he had pain and feeling warm for only 1 day. he went to his podiatrist for follow up who upon evaluation referred the patient to the ED. patient also reports he stepped over a screw 3 weeks ago. denies discharge, fever , chills, nausea, vomiting or change in bowel or urinary habits.     In ED pt had tachycardia 109, febrile Tmax 100.7, /89.  patient received 3 L LR , zosyn and vanco.     Attd: here for eval of rt foot ulcer, which is getting larger in size; pt developed this after stepping on screw 3 wks ago (got Td vac); pt says rt foot and lower leg were very hot; he developed fever; his pod sent him in for IV abx and further eval for sx; pt is otherwise fine; no CP or SOB    Interval History:  Patient seen and examined at bedside. No acute events overnight.  no cp, sob, abd pain +fever  R foot pain, controlled with medications, no radiation, sharp, worsen to touch    Past Medical History:  GERD (gastroesophageal reflux disease)  Depression  Diabetic foot ulcer  Dyslipidemia  HTN (hypertension)  DM (diabetes mellitus)    Past Surgical History:  Toe amputation status, right    Allergies:  No Known Allergies    Medications:  PRN:  dextrose 40% Gel 15 Gram(s) Oral once PRN Blood Glucose LESS THAN 70 milliGRAM(s)/deciliter  glucagon  Injectable 1 milliGRAM(s) IntraMuscular once PRN Glucose LESS THAN 70 milligrams/deciliter    Standing:  aspirin enteric coated 81 milliGRAM(s) Oral daily  atorvastatin 20 milliGRAM(s) Oral at bedtime  cefepime   IVPB 2000 milliGRAM(s) IV Intermittent every 8 hours  chlorhexidine 4% Liquid 1 Application(s) Topical <User Schedule>  dextrose 5%. 1000 milliLiter(s) (50 mL/Hr) IV Continuous <Continuous>  dextrose 50% Injectable 12.5 Gram(s) IV Push once  dextrose 50% Injectable 25 Gram(s) IV Push once  dextrose 50% Injectable 25 Gram(s) IV Push once  enoxaparin Injectable 40 milliGRAM(s) SubCutaneous daily  insulin glargine Injectable (LANTUS) 45 Unit(s) SubCutaneous at bedtime  insulin lispro (HumaLOG) corrective regimen sliding scale   SubCutaneous three times a day before meals  insulin lispro Injectable (HumaLOG) 13 Unit(s) SubCutaneous three times a day before meals  lisinopril 40 milliGRAM(s) Oral daily  magnesium oxide 400 milliGRAM(s) Oral daily  magnesium sulfate  IVPB 2 Gram(s) IV Intermittent once  metroNIDAZOLE  IVPB 500 milliGRAM(s) IV Intermittent every 8 hours  NIFEdipine XL 60 milliGRAM(s) Oral daily  pantoprazole    Tablet 40 milliGRAM(s) Oral before breakfast  sertraline 50 milliGRAM(s) Oral daily  vancomycin  IVPB 1250 milliGRAM(s) IV Intermittent every 12 hours    Home:  benazepril 40 mg oral tablet: 1 tab(s) orally once a day  Crestor 10 mg oral tablet: 1 tab(s) orally once a day (at bedtime)  HumuLIN 70/30 KwikPen 70 units-30 units/mL subcutaneous suspension: 50 unit(s) subcutaneous once a day before breakfast  HumuLIN 70/30 subcutaneous suspension: 40 unit(s) subcutaneous once a day (at bedtime)  Janumet 50 mg-1000 mg oral tablet: 1 tab(s) orally 2 times a day  omeprazole 20 mg oral delayed release capsule: 1 cap(s) orally once a day  sertraline 50 mg oral tablet: 1 tab(s) orally once a day    Vitals:  T(C): 35.6, Max: 38.2 (11-17-19 @ 20:00)  T(F): 96, Max: 100.8 (11-17-19 @ 20:00)  HR: 80 (80 - 91)  BP: 131/60 (126/60 - 611/61)  RR: 18 (18 - 18)  SpO2: --    Physical Exam:  General: Awake, Alert. Not in acute distress.  Heart: Regular rate and rhythm; S1, S2; No murmurs.  Lungs: Clear to auscultation bilaterally.  Abdomen: Soft, nontender, nondistended.  Extremities: Left partial Hallux amputation, Right foot dressed  Neuro: AAOx3, No focal deficits.    Labs:  CBC (11-18 @ 06:50)                        Hgb: 10.0   WBC: 10.96 )-----------------( Plts: 273                              Hct: 30.3     Chem (11-18 @ 06:50)  Na: 139  |     Cl: 102     |  BUN: 13  -----------------------------------------< Gluc: 63    K: 4.3   |    HCO3: 22  |  Cr: 1.1    Ca 8.3 (11-18 @ 06:50)  Mg 1.7 (11-18 @ 06:50)    LFTs (11-18 @ 06:50)  TPro 5.4  /  Alb 3.1  TBili 0.8  /  DBili     AST 16  /  ALT 15  /  AlkPhos 98    PT/INR (11-18 @ 06:50)  PT: 16.90; INR: 1.48   PTT:                    Microbiology:  Culture - Urine (collected 11-15 @ 10:20)  Source: .Urine Clean Catch (Midstream)  Final Report (11-16 @ 16:39):    No growth    Culture - Other (collected 11-14 @ 20:40)  Source: .Other Right Foot Wound  Preliminary Report (11-17 @ 11:32):    Rare Staphylococcus aureus    Normal skin devendra isolated    Culture - Blood (collected 11-14 @ 17:03)  Source: .Blood Blood  Preliminary Report (11-15 @ 23:02):    No growth to date.    Culture - Blood (collected 11-14 @ 17:03)  Source: .Blood Blood  Preliminary Report (11-15 @ 23:02):    No growth to date.        Radiology:

## 2019-11-18 NOTE — PROGRESS NOTE ADULT - SUBJECTIVE AND OBJECTIVE BOX
JOSÉ MANUEL PORTER  69y, Male  Allergy: No Known Allergies      CHIEF COMPLAINT: diabetic foot ulcer (18 Nov 2019 08:19)      INTERVAL EVENTS/HPI  - T(F): , Max: 100.8 (11-17-19 @ 20:00)  - Denies any worsening symptoms  - Tolerating medication  - WBC Count: 10.96 (11-18-19 @ 06:50)  WBC Count: 10.40 (11-17-19 @ 07:45)  - Creatinine, Serum: 1.1 (11-18-19 @ 06:50)  Creatinine, Serum: 1.2 (11-17-19 @ 07:45)       ROS  General: Denies rigors, nightsweats  HEENT: Denies headache, rhinorrhea, sore throat, eye pain  CV: Denies CP, palpitations  PULM: Denies wheezing, hemoptysis  GI: Denies hematemesis, hematochezia, melena  : Denies discharge, hematuria  MSK: Denies arthralgias, myalgias  SKIN: Denies rash, lesions  NEURO: Denies paresthesias, weakness  PSYCH: Denies depression, anxiety    VITALS:  T(F): 96, Max: 100.8 (11-17-19 @ 20:00)  HR: 80  BP: 131/60  RR: 18Vital Signs Last 24 Hrs  T(C): 35.6 (18 Nov 2019 05:02), Max: 38.2 (17 Nov 2019 20:00)  T(F): 96 (18 Nov 2019 05:02), Max: 100.8 (17 Nov 2019 20:00)  HR: 80 (18 Nov 2019 05:02) (80 - 91)  BP: 131/60 (18 Nov 2019 05:02) (126/60 - 611/61)  BP(mean): --  RR: 18 (18 Nov 2019 05:02) (18 - 18)  SpO2: --    PHYSICAL EXAM:  Gen: NAD, resting in bed  HEENT: Normocephalic, atraumatic  Neck: supple, no lymphadenopathy  CV: Regular rate & regular rhythm  Lungs: decreased BS at bases, no fremitus  Abdomen: Soft, BS present  Ext: Warm, well perfused, RLE dressings  Neuro: non focal, awake  Skin: no rash, no erythema  Lines: no phlebitis    FH: Non-contributory  Social Hx: Non-contributory    TESTS & MEASUREMENTS:                        10.0   10.96 )-----------( 273      ( 18 Nov 2019 06:50 )             30.3     11-18    139  |  102  |  13  ----------------------------<  63<L>  4.3   |  22  |  1.1    Ca    8.3<L>      18 Nov 2019 06:50  Mg     1.7     11-18    TPro  5.4<L>  /  Alb  3.1<L>  /  TBili  0.8  /  DBili  x   /  AST  16  /  ALT  15  /  AlkPhos  98  11-18    eGFR if Non African American: 67 mL/min/1.73M2 (11-18-19 @ 06:50)  eGFR if African American: 78 mL/min/1.73M2 (11-18-19 @ 06:50)    LIVER FUNCTIONS - ( 18 Nov 2019 06:50 )  Alb: 3.1 g/dL / Pro: 5.4 g/dL / ALK PHOS: 98 U/L / ALT: 15 U/L / AST: 16 U/L / GGT: x               Culture - Urine (collected 11-15-19 @ 10:20)  Source: .Urine Clean Catch (Midstream)  Final Report (11-16-19 @ 16:39):    No growth    Culture - Other (collected 11-14-19 @ 20:40)  Source: .Other Right Foot Wound  Preliminary Report (11-17-19 @ 11:32):    Rare Staphylococcus aureus    Normal skin devendra isolated    Culture - Blood (collected 11-14-19 @ 17:03)  Source: .Blood Blood  Preliminary Report (11-15-19 @ 23:02):    No growth to date.    Culture - Blood (collected 11-14-19 @ 17:03)  Source: .Blood Blood  Preliminary Report (11-15-19 @ 23:02):    No growth to date.        Blood Gas Venous - Lactate: 1.8 mmoL/L (11-14-19 @ 18:12)      INFECTIOUS DISEASES TESTING      RADIOLOGY & ADDITIONAL TESTS:  I have personally reviewed the last Chest xray  CXR      CT      CARDIOLOGY TESTING      MEDICATIONS  aspirin enteric coated 81  atorvastatin 20  cefepime   IVPB 2000  chlorhexidine 4% Liquid 1  dextrose 5%. 1000  dextrose 50% Injectable 12.5  dextrose 50% Injectable 25  dextrose 50% Injectable 25  enoxaparin Injectable 40  insulin glargine Injectable (LANTUS) 45  insulin lispro (HumaLOG) corrective regimen sliding scale   insulin lispro Injectable (HumaLOG) 13  lisinopril 40  magnesium oxide 400  magnesium sulfate  IVPB 2  metroNIDAZOLE  IVPB 500  NIFEdipine XL 60  pantoprazole    Tablet 40  sertraline 50  vancomycin  IVPB 1250      ANTIBIOTICS:  cefepime   IVPB 2000 milliGRAM(s) IV Intermittent every 8 hours  metroNIDAZOLE  IVPB 500 milliGRAM(s) IV Intermittent every 8 hours  vancomycin  IVPB 1250 milliGRAM(s) IV Intermittent every 12 hours      All available historical records have been reviewed

## 2019-11-18 NOTE — PROGRESS NOTE ADULT - ASSESSMENT
ASSESSMENT  70 yo M with PMH of insulin-dependent diabetes mellitus , Diabetic foot ulcer s/p R great toe amputation and Partial Hallux Amputation Left Foot admitted for DFU    IMPRESSION  #R plantar DFU after stepping on a screw 3 weeks ago, r/o OM     WCX   Rare Staphylococcus aureus    MRI 1. Medial and metatarsal fat pad ulcer with adjacent phlegmon/tissue ischemia as detailed above  2.Third MTP septic arthritis, third metatarsal head and proximal phalangeal base osteomyelitis3. Second metatarsal neck osteomyelitis; second metatarsal head chronic AVN 4. Third distal flexor tenosynovitis with enhancement, consistent with septic arthritis    Sedimentation Rate, Erythrocyte: 49 mm/Hr (11.14.19 @ 17:03) <-- 61 8/2019    CRP 15    BCX NG    8/2019 tissue cx ecoli/kleb panS  #Tm 100.7 P>90 WBC 12.89, sepsis ruled out  #Obesity BMI (kg/m2): 30.1  #DM Hemoglobin A1C, Whole Blood: 11.2 (12-27-18 @ 06:07)    RECOMMENDATIONS  - Plan for PICC as no plan for surgical intervention per Podiatry  - f/u s. aureus sensitivities , if MSSA d/c vanc   -  flagyl 500mg q8h IV  - Cefepime 2g q8h IV  - Vanc 1.25 q12h IV  Vancomycin Level, Trough: 14.1:(11.16.19 @ 18:16)- keep current dose  - Please check vanc trough 30 min prior to 4th dose      Spectra [id number]

## 2019-11-18 NOTE — PROGRESS NOTE ADULT - SUBJECTIVE AND OBJECTIVE BOX
Podiatry Progress Note    Subjective:   JOSÉ MANUEL PORTER is a pleasant well-nourished, well developed 69y Male in no acute distress, alert awake, and oriented to person, place and time.   Patient is a 69y old  Male who presents with a chief complaint of diabetic foot ulcer (17 Nov 2019 14:37)    Objective:  Vital Signs Last 24 Hrs  T(C): 35.6 (18 Nov 2019 05:02), Max: 38.2 (17 Nov 2019 20:00)  T(F): 96 (18 Nov 2019 05:02), Max: 100.8 (17 Nov 2019 20:00)  HR: 80 (18 Nov 2019 05:02) (80 - 91)  BP: 131/60 (18 Nov 2019 05:02) (126/60 - 611/61)  BP(mean): --  RR: 18 (18 Nov 2019 05:02) (18 - 18)  SpO2: --                        10.1   10.40 )-----------( 263      ( 17 Nov 2019 07:45 )             30.9                 11-17    140  |  102  |  14  ----------------------------<  127<H>  4.2   |  25  |  1.2    Ca    8.5      17 Nov 2019 07:45  Mg     1.7     11-17    TPro  5.4<L>  /  Alb  3.0<L>  /  TBili  0.5  /  DBili  x   /  AST  14  /  ALT  17  /  AlkPhos  94  11-17    Derm:   Plantar Ulcer Right Foot Between Submet 2 and 3  Macerated Periwound w/ Fibrotic Tissue   Wound probes Deep to Capsule and Possibly Bone  Mild Erythema and Edema w/ Drainage w/ Mild Odor    Vascular:   Non-Palpable Pulses on the Right Foot   Foot was warm to warm to the touch   Capillary Refill < 3 Seconds     Neuro: Protective Sensation Mildly Intact     Assessment:   Right Foot DFU  Osteomyelitis   Diabetic    Plan:  Chart reviewed and Patient evaluated. All Questions and concerns were addressed and answered   Discussed diagnosis and treatment with patient  Wound Flushed w/ Peroxide and Irrigated w/ Normal Saline; Applied Betadine Soaked Gauze / DSD / Kerlix   MR Imaging Right Foot; 3rd Metatarsal Head and Base and 2nd Metatarsal Neck w/ Osteomyelitis   WoundCx (11/14); Grew Rare Staph Aureus   Cont IV ABx; As Per ID   Will Discuss Plan w/ Infectious Disease; Patient would rather not have Surgery and Would Prefer a Conservative Treatment.   Attending Aware and updated Podiatry Progress Note    Subjective:   JOSÉ MANUEL PORTER is a pleasant well-nourished, well developed 69y Male in no acute distress, alert awake, and oriented to person, place and time.   Patient is a 69y old  Male who presents with a chief complaint of diabetic foot ulcer (17 Nov 2019 14:37)    Objective:  Vital Signs Last 24 Hrs  T(C): 35.6 (18 Nov 2019 05:02), Max: 38.2 (17 Nov 2019 20:00)  T(F): 96 (18 Nov 2019 05:02), Max: 100.8 (17 Nov 2019 20:00)  HR: 80 (18 Nov 2019 05:02) (80 - 91)  BP: 131/60 (18 Nov 2019 05:02) (126/60 - 611/61)  BP(mean): --  RR: 18 (18 Nov 2019 05:02) (18 - 18)  SpO2: --                        10.1   10.40 )-----------( 263      ( 17 Nov 2019 07:45 )             30.9                 11-17    140  |  102  |  14  ----------------------------<  127<H>  4.2   |  25  |  1.2    Ca    8.5      17 Nov 2019 07:45  Mg     1.7     11-17    TPro  5.4<L>  /  Alb  3.0<L>  /  TBili  0.5  /  DBili  x   /  AST  14  /  ALT  17  /  AlkPhos  94  11-17  ____________________________________________________________________________  PROCEDURE DATE:  11/15/2019      INTERPRETATION:  Clinical History / Reason for exam: Diabetic foot ulcer    TECHNIQUE: Multiplanar multi sequential water and fat weighted   precontrast sequencing is obtained through the right foot. Following   intravenous gadolinium injection of 10 cc with 0 discard postcontrast   imaging is obtained with subtraction sequence in the short axis plane.    Comparison right foot x-rays 9/12/2019 11/15/2019    FINDINGS:     At plantar medial metatarsal fat pad deep to the second MTP joint there   is a soft tissue ulcer measuring 3.3 AP by 1.4 mL by 0.4 cm cc.     Osteomyelitis present along the medial border of third metatarsal head   with marrow infiltration, bony erosion and enhancement (series 101 image   28, series 9 image 16). There is third MTP enhancing joint effusion and   infiltration along the lateral border of third proximal phalangeal base.   (Series 13 image 13)    Second metatarsal enhancement or marrow replacement along the head neck   junction at plantar aspect consistent with superimposed osteomyelitis.   Freiberg's infraction present (series 11 image 29, series 6 image 29) No   enhancing second MTP joint effusion present.    Status post great toe, sesamoid and partial metatarsal head amputation   with metallic surgical clips creating artifact decreasing the overall   specificity of exam. No residual or recurrent osteomyelitis at the first   ray.    Postcontrast imaging demonstrates geographic region of metatarsal fat pad   nonenhancement with elevated STIR signal dissecting from the subsecond   metatarsal head ulcer into the second interspace (series 7 image 28,   series 101 image 28, series 6 image 28). This suggests mixed pattern of   tissue ischemia and phlegmon. No rim-enhancing abscess identified.    Enhancing third flexor tenosynovitis present at level of distal diaphysis   (series 101 image 24, series 7 image 24), likely septic. Otherwise there   is no significant enhancement along the flexor or extensor tendons.   Artifact adjacent to soft tissue surrounding hallux MTP joint confounds   assessment of this area.     There is elevated T1 and T2 signal present within the intrinsic muscles   of the heel consistent with neuritis pattern.  Remaining bony structures   demonstrate toe clawing compatible with neuropathic osteoarthropathy.   Midfoot alignment intact with subchondral cyst at base of fourth   metatarsal dorsal lateral subcutaneous soft tissue swelling present.    IMPRESSION:  1. Medial and metatarsal fat pad ulcer with adjacent phlegmon/tissue   ischemia as detailed above  2.Third MTP septic arthritis, third metatarsal head and proximal   phalangeal base osteomyelitis  3. Second metatarsal neck osteomyelitis; second metatarsal head chronic   AVN  4. Third distal flexor tenosynovitis with enhancement, consistent with   septic arthritis    KATHY BENITEZ M.D., ATTENDING RADIOLOGIST  ____________________________________________________________________________    Physical:  Derm  Plantar Ulcer Right Foot Between Submet 2 and 3  Macerated Periwound w/ Fibrotic Tissue   Wound probes Deep to Capsule and Possibly Bone  Mild Erythema and Edema w/ Drainage w/ Mild Odor    Vascular:   Non-Palpable Pulses on the Right Foot   Foot was warm to warm to the touch   Capillary Refill < 3 Seconds     Neuro: Protective Sensation Mildly Intact     Assessment:   Right Foot DFU  Osteomyelitis   Diabetic    Plan:  Chart reviewed and Patient evaluated. All Questions and concerns were addressed and answered   Discussed diagnosis and treatment with patient  Wound Flushed w/ Peroxide and Irrigated w/ Normal Saline; Applied Betadine Soaked Gauze / DSD / Kerlix   MR Imaging Right Foot; 3rd Metatarsal Head and Base and 2nd Metatarsal Neck w/ Osteomyelitis   WoundCx (11/14); Grew Rare Staph Aureus   Cont IV ABx; As Per ID   Will Discuss Plan w/ Infectious Disease; Patient would rather not have Surgery and Would Prefer a Conservative Treatment.   Attending Aware and updated Podiatry Progress Note    Subjective:   JOSÉ MANUEL PORTER is a pleasant well-nourished, well developed 69y Male in no acute distress, alert awake, and oriented to person, place and time.   Patient is a 69y old  Male who presents with a chief complaint of diabetic foot ulcer (17 Nov 2019 14:37)    Objective:  Vital Signs Last 24 Hrs  T(C): 35.6 (18 Nov 2019 05:02), Max: 38.2 (17 Nov 2019 20:00)  T(F): 96 (18 Nov 2019 05:02), Max: 100.8 (17 Nov 2019 20:00)  HR: 80 (18 Nov 2019 05:02) (80 - 91)  BP: 131/60 (18 Nov 2019 05:02) (126/60 - 611/61)  BP(mean): --  RR: 18 (18 Nov 2019 05:02) (18 - 18)  SpO2: --                        10.1   10.40 )-----------( 263      ( 17 Nov 2019 07:45 )             30.9                 11-17    140  |  102  |  14  ----------------------------<  127<H>  4.2   |  25  |  1.2    Ca    8.5      17 Nov 2019 07:45  Mg     1.7     11-17    TPro  5.4<L>  /  Alb  3.0<L>  /  TBili  0.5  /  DBili  x   /  AST  14  /  ALT  17  /  AlkPhos  94  11-17  ____________________________________________________________________________  PROCEDURE DATE:  11/15/2019      INTERPRETATION:  Clinical History / Reason for exam: Diabetic foot ulcer    TECHNIQUE: Multiplanar multi sequential water and fat weighted   precontrast sequencing is obtained through the right foot. Following   intravenous gadolinium injection of 10 cc with 0 discard postcontrast   imaging is obtained with subtraction sequence in the short axis plane.    Comparison right foot x-rays 9/12/2019 11/15/2019    FINDINGS:     At plantar medial metatarsal fat pad deep to the second MTP joint there   is a soft tissue ulcer measuring 3.3 AP by 1.4 mL by 0.4 cm cc.     Osteomyelitis present along the medial border of third metatarsal head   with marrow infiltration, bony erosion and enhancement (series 101 image   28, series 9 image 16). There is third MTP enhancing joint effusion and   infiltration along the lateral border of third proximal phalangeal base.   (Series 13 image 13)    Second metatarsal enhancement or marrow replacement along the head neck   junction at plantar aspect consistent with superimposed osteomyelitis.   Freiberg's infraction present (series 11 image 29, series 6 image 29) No   enhancing second MTP joint effusion present.    Status post great toe, sesamoid and partial metatarsal head amputation   with metallic surgical clips creating artifact decreasing the overall   specificity of exam. No residual or recurrent osteomyelitis at the first   ray.    Postcontrast imaging demonstrates geographic region of metatarsal fat pad   nonenhancement with elevated STIR signal dissecting from the subsecond   metatarsal head ulcer into the second interspace (series 7 image 28,   series 101 image 28, series 6 image 28). This suggests mixed pattern of   tissue ischemia and phlegmon. No rim-enhancing abscess identified.    Enhancing third flexor tenosynovitis present at level of distal diaphysis   (series 101 image 24, series 7 image 24), likely septic. Otherwise there   is no significant enhancement along the flexor or extensor tendons.   Artifact adjacent to soft tissue surrounding hallux MTP joint confounds   assessment of this area.     There is elevated T1 and T2 signal present within the intrinsic muscles   of the heel consistent with neuritis pattern.  Remaining bony structures   demonstrate toe clawing compatible with neuropathic osteoarthropathy.   Midfoot alignment intact with subchondral cyst at base of fourth   metatarsal dorsal lateral subcutaneous soft tissue swelling present.    IMPRESSION:  1. Medial and metatarsal fat pad ulcer with adjacent phlegmon/tissue   ischemia as detailed above  2.Third MTP septic arthritis, third metatarsal head and proximal   phalangeal base osteomyelitis  3. Second metatarsal neck osteomyelitis; second metatarsal head chronic   AVN  4. Third distal flexor tenosynovitis with enhancement, consistent with   septic arthritis    KATHY BENITEZ M.D., ATTENDING RADIOLOGIST  ____________________________________________________________________________    Physical:  Derm  Plantar Ulcer Right Foot Between Submet 2 and 3  Macerated Periwound w/ Fibrotic Tissue   Wound probes Deep to Capsule and Possibly Bone  Mild Erythema and Edema w/ Drainage w/ Mild Odor    Vascular:   Non-Palpable Pulses on the Right Foot   Foot was warm to warm to the touch   Capillary Refill < 3 Seconds     Neuro: Protective Sensation Mildly Intact     Assessment:   Right Foot DFU  Osteomyelitis   Diabetic    Plan:  Chart reviewed and Patient evaluated. All Questions and concerns were addressed and answered   Discussed diagnosis and treatment with patient  Wound Flushed w/ Peroxide and Irrigated w/ Normal Saline; Applied Betadine Soaked Gauze / DSD / Kerlix   MR Imaging Right Foot; 3rd Metatarsal Head and Base and 2nd Metatarsal Neck w/ Osteomyelitis   WoundCx (11/14); Grew Rare Staph Aureus   Cont IV ABx; As Per ID   Will Discuss Plan w/ Infectious Disease; Patient would rather not have Surgery and Would Prefer a Conservative Treatment.   Attending Aware and updated: see attestation

## 2019-11-18 NOTE — PROGRESS NOTE ADULT - ASSESSMENT
68 yo M with PMH of insulin-dependent diabetes mellitus , Diabetic foot ulcer s/p R great toe amputation and Partial Hallux Amputation Left Foot was sent to the ED from podiatry office Dr. Bhatia for worsening right lower ext diabetic foot ulcer. In the ED he was febrile to 100.7 and tachycardic to 109. Labs revealed leukocytosis to 12.89 and elevated inflammatory markers with ESR 49 and CRP 15.52.    # Sepsis on admission secondary to RIGHT diabetic planter foot ulcer s/p trauma by screw:  - Leukocytosis resolved now  - Prelim Wound Cx growing rare staph aureus with normal skin devendra > f/u final report  - Wound culture from left foot: 8/13/2019: Escherichia coli sensitive to cefepime, Klebsiella varicola sensitive to cefepime, Enterococcus faecalis sensitive to Unasyn and vanco  - Blood Cx negative  - MRI report from 11/15 reveals metatarsal osteomyelitis and septic arthritis  - podiatry is following the patient, will decide Sx vs Abx based on imaging (patient wishes to avoid Sx)  - C/w cefepime, vancomycin and flagyl as per ID  - Vanc trough 14.1 > continue with same dose  - Patient received tetanus shot on previous admission    # PVD  - Follows vascular as Outpt, Was recommended angiogram as Outpt after previous d/c in Aug.   - EKG and CXR preformed for clearance  - Duplex repeated 11/15 > moderate right tibial disease with no evidence of hemodynamically significant disease in LLE  - RLE angiogram scheduled for Wednesday 11/20    # Constipation  - Patient has not passed stool since Thursday  - Lactulose PO ordered stat > F/u response    # Normocytic Anemia  - Likely Anemia of Chronic Disease  - Hb 10.1 today  - F/u AM Iron Studies    # Insulin-dependent diabetes mellitus:  - On Lantus 45 and Lispro 13 along with sliding scale  - I reduced dose based on low glucose reading  - F/u FS    # HTN urgency:  - c/w Lisinopril and Nifedipine    # DLD:  - c/w statin    DVT ppx: Lovenox  GI: PPI   CHO consistent  dispo: from home 68 yo M with PMH of insulin-dependent diabetes mellitus , Diabetic foot ulcer s/p R great toe amputation and Partial Hallux Amputation Left Foot was sent to the ED from podiatry office Dr. Bhatia for worsening right lower ext diabetic foot ulcer. In the ED he was febrile to 100.7 and tachycardic to 109. Labs revealed leukocytosis to 12.89 and elevated inflammatory markers with ESR 49 and CRP 15.52.    # Sepsis on admission secondary to RIGHT diabetic planter foot ulcer s/p trauma by screw:  - WBC elevated again today   - Prelim Wound Cx growing rare staph aureus with normal skin devendra > f/u final report  - Wound culture from left foot: 8/13/2019: Escherichia coli sensitive to cefepime, Klebsiella varicola sensitive to cefepime, Enterococcus faecalis sensitive to Unasyn and vanco  - Blood Cx negative  - MRI report from 11/15 reveals metatarsal osteomyelitis and septic arthritis  - podiatry is following the patient, will decide Sx vs Abx based on imaging (patient wishes to avoid Sx)  - C/w cefepime, vancomycin and flagyl as per ID  - Vanc trough 14.1 > continue with same dose  - Patient received tetanus shot on previous admission    # PVD  - Follows vascular as Outpt, Was recommended angiogram as Outpt after previous d/c in Aug.   - EKG and CXR preformed for clearance  - Duplex repeated 11/15 > moderate right tibial disease with no evidence of hemodynamically significant disease in LLE  - RLE angiogram scheduled for Wednesday 11/20    # Hypomagnesemia  - 1.7 today  - Supplementing  - F/u tomorrow    # Constipation  - Patient had not passed stool since Thursday  - Lactulose PO ordered stat > Passed stool yesterday    # Normocytic Anemia  - Likely Anemia of Chronic Disease  - Hb 10.1 today  - F/u AM Iron Studies    # Insulin-dependent diabetes mellitus:  - On Lantus 45 and Lispro 13 along with sliding scale  - I reduced dose based on low glucose reading  - F/u FS    # HTN urgency:  - c/w Lisinopril and Nifedipine    # DLD:  - c/w statin    DVT ppx: Lovenox  GI: PPI   CHO consistent  dispo: from home 70 yo M with PMH of insulin-dependent diabetes mellitus , Diabetic foot ulcer s/p R great toe amputation and Partial Hallux Amputation Left Foot was sent to the ED from podiatry office Dr. Bhatia for worsening right lower ext diabetic foot ulcer. In the ED he was febrile to 100.7 and tachycardic to 109. Labs revealed leukocytosis to 12.89 and elevated inflammatory markers with ESR 49 and CRP 15.52.    # Sepsis on admission secondary to RIGHT diabetic planter foot ulcer s/p trauma by screw:  - WBC mildly  elevated again today   - Prelim Wound Cx growing rare staph aureus with normal skin devendra > f/u final report  - Wound culture from left foot on previous admission: 8/13/2019: Escherichia coli sensitive to cefepime, Klebsiella varicola sensitive to cefepime, Enterococcus faecalis sensitive to Unasyn and vanco  - Blood Cx negative  - MRI report from 11/15 reveals metatarsal osteomyelitis and septic arthritis  - podiatry is following: No surgical intervention  - ID: Plan for PICC  - C/w cefepime, vancomycin and flagyl  - Vanc trough 11/16 14.1 > continue with same dose, Repeat Trough Today 11/18  - Patient received tetanus shot on previous admission    # PVD  - Follows vascular as Outpt, Was recommended angiogram as Outpt after previous d/c in Aug.   - EKG and CXR preformed for clearance  - Duplex repeated 11/15 > moderate right tibial disease with no evidence of hemodynamically significant disease in LLE  - RLE angiogram scheduled for Wednesday 11/20    # Hypomagnesemia  - 1.7 today 11/18  - Supplementing  - F/u tomorrow    # Constipation  - Patient had not passed stool since Thursday  - Lactulose PO ordered stat > Passed stool yesterday    # Normocytic Anemia  - Likely Anemia of Chronic Disease  - Hb 10.1 today    # Insulin-dependent diabetes mellitus:  - On Lantus 45 and Lispro 13 along with sliding scale  - Reduced dose based on low glucose reading  - FS good    # HTN urgency:  - Controlled  - c/w Lisinopril and Nifedipine    # DLD:  - c/w statin    DVT ppx: Lovenox  GI: PPI   Diet: CHO consistent  Dispo: from home

## 2019-11-19 LAB
ALBUMIN SERPL ELPH-MCNC: 3 G/DL — LOW (ref 3.5–5.2)
ALP SERPL-CCNC: 103 U/L — SIGNIFICANT CHANGE UP (ref 30–115)
ALT FLD-CCNC: 15 U/L — SIGNIFICANT CHANGE UP (ref 0–41)
ANION GAP SERPL CALC-SCNC: 12 MMOL/L — SIGNIFICANT CHANGE UP (ref 7–14)
ANION GAP SERPL CALC-SCNC: 12 MMOL/L — SIGNIFICANT CHANGE UP (ref 7–14)
APTT BLD: 31.4 SEC — SIGNIFICANT CHANGE UP (ref 27–39.2)
AST SERPL-CCNC: 20 U/L — SIGNIFICANT CHANGE UP (ref 0–41)
BASOPHILS # BLD AUTO: 0.02 K/UL — SIGNIFICANT CHANGE UP (ref 0–0.2)
BASOPHILS # BLD AUTO: 0.03 K/UL — SIGNIFICANT CHANGE UP (ref 0–0.2)
BASOPHILS NFR BLD AUTO: 0.2 % — SIGNIFICANT CHANGE UP (ref 0–1)
BASOPHILS NFR BLD AUTO: 0.3 % — SIGNIFICANT CHANGE UP (ref 0–1)
BILIRUB SERPL-MCNC: 0.3 MG/DL — SIGNIFICANT CHANGE UP (ref 0.2–1.2)
BLD GP AB SCN SERPL QL: SIGNIFICANT CHANGE UP
BUN SERPL-MCNC: 17 MG/DL — SIGNIFICANT CHANGE UP (ref 10–20)
BUN SERPL-MCNC: 19 MG/DL — SIGNIFICANT CHANGE UP (ref 10–20)
CALCIUM SERPL-MCNC: 8.5 MG/DL — SIGNIFICANT CHANGE UP (ref 8.5–10.1)
CALCIUM SERPL-MCNC: 8.6 MG/DL — SIGNIFICANT CHANGE UP (ref 8.5–10.1)
CHLORIDE SERPL-SCNC: 102 MMOL/L — SIGNIFICANT CHANGE UP (ref 98–110)
CHLORIDE SERPL-SCNC: 102 MMOL/L — SIGNIFICANT CHANGE UP (ref 98–110)
CO2 SERPL-SCNC: 26 MMOL/L — SIGNIFICANT CHANGE UP (ref 17–32)
CO2 SERPL-SCNC: 27 MMOL/L — SIGNIFICANT CHANGE UP (ref 17–32)
CREAT SERPL-MCNC: 1.1 MG/DL — SIGNIFICANT CHANGE UP (ref 0.7–1.5)
CREAT SERPL-MCNC: 1.2 MG/DL — SIGNIFICANT CHANGE UP (ref 0.7–1.5)
CULTURE RESULTS: SIGNIFICANT CHANGE UP
CULTURE RESULTS: SIGNIFICANT CHANGE UP
EOSINOPHIL # BLD AUTO: 0.09 K/UL — SIGNIFICANT CHANGE UP (ref 0–0.7)
EOSINOPHIL # BLD AUTO: 0.12 K/UL — SIGNIFICANT CHANGE UP (ref 0–0.7)
EOSINOPHIL NFR BLD AUTO: 0.8 % — SIGNIFICANT CHANGE UP (ref 0–8)
EOSINOPHIL NFR BLD AUTO: 1.1 % — SIGNIFICANT CHANGE UP (ref 0–8)
GLUCOSE BLDC GLUCOMTR-MCNC: 101 MG/DL — HIGH (ref 70–99)
GLUCOSE BLDC GLUCOMTR-MCNC: 124 MG/DL — HIGH (ref 70–99)
GLUCOSE BLDC GLUCOMTR-MCNC: 133 MG/DL — HIGH (ref 70–99)
GLUCOSE BLDC GLUCOMTR-MCNC: 190 MG/DL — HIGH (ref 70–99)
GLUCOSE BLDC GLUCOMTR-MCNC: 70 MG/DL — SIGNIFICANT CHANGE UP (ref 70–99)
GLUCOSE SERPL-MCNC: 212 MG/DL — HIGH (ref 70–99)
GLUCOSE SERPL-MCNC: 76 MG/DL — SIGNIFICANT CHANGE UP (ref 70–99)
HCT VFR BLD CALC: 30.7 % — LOW (ref 42–52)
HCT VFR BLD CALC: 34.1 % — LOW (ref 42–52)
HGB BLD-MCNC: 10.1 G/DL — LOW (ref 14–18)
HGB BLD-MCNC: 11 G/DL — LOW (ref 14–18)
IMM GRANULOCYTES NFR BLD AUTO: 1 % — HIGH (ref 0.1–0.3)
IMM GRANULOCYTES NFR BLD AUTO: 1.6 % — HIGH (ref 0.1–0.3)
INR BLD: 1.49 RATIO — HIGH (ref 0.65–1.3)
LYMPHOCYTES # BLD AUTO: 0.69 K/UL — LOW (ref 1.2–3.4)
LYMPHOCYTES # BLD AUTO: 0.83 K/UL — LOW (ref 1.2–3.4)
LYMPHOCYTES # BLD AUTO: 5.9 % — LOW (ref 20.5–51.1)
LYMPHOCYTES # BLD AUTO: 7.8 % — LOW (ref 20.5–51.1)
MAGNESIUM SERPL-MCNC: 2 MG/DL — SIGNIFICANT CHANGE UP (ref 1.8–2.4)
MAGNESIUM SERPL-MCNC: 2.1 MG/DL — SIGNIFICANT CHANGE UP (ref 1.8–2.4)
MCHC RBC-ENTMCNC: 28.6 PG — SIGNIFICANT CHANGE UP (ref 27–31)
MCHC RBC-ENTMCNC: 28.9 PG — SIGNIFICANT CHANGE UP (ref 27–31)
MCHC RBC-ENTMCNC: 32.3 G/DL — SIGNIFICANT CHANGE UP (ref 32–37)
MCHC RBC-ENTMCNC: 32.9 G/DL — SIGNIFICANT CHANGE UP (ref 32–37)
MCV RBC AUTO: 87.7 FL — SIGNIFICANT CHANGE UP (ref 80–94)
MCV RBC AUTO: 88.8 FL — SIGNIFICANT CHANGE UP (ref 80–94)
MONOCYTES # BLD AUTO: 0.92 K/UL — HIGH (ref 0.1–0.6)
MONOCYTES # BLD AUTO: 1.02 K/UL — HIGH (ref 0.1–0.6)
MONOCYTES NFR BLD AUTO: 7.8 % — SIGNIFICANT CHANGE UP (ref 1.7–9.3)
MONOCYTES NFR BLD AUTO: 9.6 % — HIGH (ref 1.7–9.3)
NEUTROPHILS # BLD AUTO: 8.52 K/UL — HIGH (ref 1.4–6.5)
NEUTROPHILS # BLD AUTO: 9.8 K/UL — HIGH (ref 1.4–6.5)
NEUTROPHILS NFR BLD AUTO: 80.3 % — HIGH (ref 42.2–75.2)
NEUTROPHILS NFR BLD AUTO: 83.6 % — HIGH (ref 42.2–75.2)
NRBC # BLD: 0 /100 WBCS — SIGNIFICANT CHANGE UP (ref 0–0)
NRBC # BLD: 0 /100 WBCS — SIGNIFICANT CHANGE UP (ref 0–0)
PHOSPHATE SERPL-MCNC: 3.3 MG/DL — SIGNIFICANT CHANGE UP (ref 2.1–4.9)
PLATELET # BLD AUTO: 303 K/UL — SIGNIFICANT CHANGE UP (ref 130–400)
PLATELET # BLD AUTO: 349 K/UL — SIGNIFICANT CHANGE UP (ref 130–400)
POTASSIUM SERPL-MCNC: 4.5 MMOL/L — SIGNIFICANT CHANGE UP (ref 3.5–5)
POTASSIUM SERPL-MCNC: 4.9 MMOL/L — SIGNIFICANT CHANGE UP (ref 3.5–5)
POTASSIUM SERPL-SCNC: 4.5 MMOL/L — SIGNIFICANT CHANGE UP (ref 3.5–5)
POTASSIUM SERPL-SCNC: 4.9 MMOL/L — SIGNIFICANT CHANGE UP (ref 3.5–5)
PROT SERPL-MCNC: 5.4 G/DL — LOW (ref 6–8)
PROTHROM AB SERPL-ACNC: 17.1 SEC — HIGH (ref 9.95–12.87)
RBC # BLD: 3.5 M/UL — LOW (ref 4.7–6.1)
RBC # BLD: 3.84 M/UL — LOW (ref 4.7–6.1)
RBC # FLD: 13.1 % — SIGNIFICANT CHANGE UP (ref 11.5–14.5)
RBC # FLD: 13.1 % — SIGNIFICANT CHANGE UP (ref 11.5–14.5)
SODIUM SERPL-SCNC: 140 MMOL/L — SIGNIFICANT CHANGE UP (ref 135–146)
SODIUM SERPL-SCNC: 141 MMOL/L — SIGNIFICANT CHANGE UP (ref 135–146)
SPECIMEN SOURCE: SIGNIFICANT CHANGE UP
SPECIMEN SOURCE: SIGNIFICANT CHANGE UP
WBC # BLD: 10.62 K/UL — SIGNIFICANT CHANGE UP (ref 4.8–10.8)
WBC # BLD: 11.72 K/UL — HIGH (ref 4.8–10.8)
WBC # FLD AUTO: 10.62 K/UL — SIGNIFICANT CHANGE UP (ref 4.8–10.8)
WBC # FLD AUTO: 11.72 K/UL — HIGH (ref 4.8–10.8)

## 2019-11-19 PROCEDURE — 93010 ELECTROCARDIOGRAM REPORT: CPT

## 2019-11-19 PROCEDURE — 99232 SBSQ HOSP IP/OBS MODERATE 35: CPT

## 2019-11-19 PROCEDURE — 71045 X-RAY EXAM CHEST 1 VIEW: CPT | Mod: 26

## 2019-11-19 PROCEDURE — 36573 INSJ PICC RS&I 5 YR+: CPT

## 2019-11-19 RX ORDER — INSULIN GLARGINE 100 [IU]/ML
38 INJECTION, SOLUTION SUBCUTANEOUS AT BEDTIME
Refills: 0 | Status: DISCONTINUED | OUTPATIENT
Start: 2019-11-19 | End: 2019-11-20

## 2019-11-19 RX ORDER — CEFTRIAXONE 500 MG/1
2000 INJECTION, POWDER, FOR SOLUTION INTRAMUSCULAR; INTRAVENOUS EVERY 24 HOURS
Refills: 0 | Status: DISCONTINUED | OUTPATIENT
Start: 2019-11-19 | End: 2019-11-20

## 2019-11-19 RX ORDER — ASPIRIN/CALCIUM CARB/MAGNESIUM 324 MG
81 TABLET ORAL DAILY
Refills: 0 | Status: DISCONTINUED | OUTPATIENT
Start: 2019-11-19 | End: 2019-11-20

## 2019-11-19 RX ORDER — METRONIDAZOLE 500 MG
500 TABLET ORAL EVERY 12 HOURS
Refills: 0 | Status: DISCONTINUED | OUTPATIENT
Start: 2019-11-19 | End: 2019-11-20

## 2019-11-19 RX ADMIN — Medication 1: at 17:07

## 2019-11-19 RX ADMIN — Medication 13 UNIT(S): at 12:12

## 2019-11-19 RX ADMIN — SERTRALINE 50 MILLIGRAM(S): 25 TABLET, FILM COATED ORAL at 12:24

## 2019-11-19 RX ADMIN — MAGNESIUM OXIDE 400 MG ORAL TABLET 400 MILLIGRAM(S): 241.3 TABLET ORAL at 12:23

## 2019-11-19 RX ADMIN — Medication 13 UNIT(S): at 17:07

## 2019-11-19 RX ADMIN — Medication 100 MILLIGRAM(S): at 13:04

## 2019-11-19 RX ADMIN — Medication 100 MILLIGRAM(S): at 05:40

## 2019-11-19 RX ADMIN — CEFEPIME 100 MILLIGRAM(S): 1 INJECTION, POWDER, FOR SOLUTION INTRAMUSCULAR; INTRAVENOUS at 13:04

## 2019-11-19 RX ADMIN — LISINOPRIL 40 MILLIGRAM(S): 2.5 TABLET ORAL at 05:58

## 2019-11-19 RX ADMIN — ATORVASTATIN CALCIUM 20 MILLIGRAM(S): 80 TABLET, FILM COATED ORAL at 23:53

## 2019-11-19 RX ADMIN — CEFTRIAXONE 100 MILLIGRAM(S): 500 INJECTION, POWDER, FOR SOLUTION INTRAMUSCULAR; INTRAVENOUS at 17:01

## 2019-11-19 RX ADMIN — Medication 60 MILLIGRAM(S): at 05:58

## 2019-11-19 RX ADMIN — INSULIN GLARGINE 38 UNIT(S): 100 INJECTION, SOLUTION SUBCUTANEOUS at 23:53

## 2019-11-19 RX ADMIN — Medication 81 MILLIGRAM(S): at 12:23

## 2019-11-19 RX ADMIN — CHLORHEXIDINE GLUCONATE 1 APPLICATION(S): 213 SOLUTION TOPICAL at 05:41

## 2019-11-19 RX ADMIN — PANTOPRAZOLE SODIUM 40 MILLIGRAM(S): 20 TABLET, DELAYED RELEASE ORAL at 06:11

## 2019-11-19 RX ADMIN — CEFEPIME 100 MILLIGRAM(S): 1 INJECTION, POWDER, FOR SOLUTION INTRAMUSCULAR; INTRAVENOUS at 05:40

## 2019-11-19 NOTE — PROGRESS NOTE ADULT - ASSESSMENT
ASSESSMENT  70 yo M with PMH of insulin-dependent diabetes mellitus , Diabetic foot ulcer s/p R great toe amputation and Partial Hallux Amputation Left Foot admitted for DFU    IMPRESSION  #R plantar DFU after stepping on a screw 3 weeks ago, r/o OM     WCX MSSA    MRI 1. Medial and metatarsal fat pad ulcer with adjacent phlegmon/tissue ischemia as detailed above  2.Third MTP septic arthritis, third metatarsal head and proximal phalangeal base osteomyelitis3. Second metatarsal neck osteomyelitis; second metatarsal head chronic AVN 4. Third distal flexor tenosynovitis with enhancement, consistent with septic arthritis    Sedimentation Rate, Erythrocyte: 49 mm/Hr (11.14.19 @ 17:03) <-- 61 8/2019    CRP 15    BCX NG    8/2019 tissue cx ecoli/kleb panS  #Tm 100.7 P>90 WBC 12.89, sepsis ruled out  #Obesity BMI (kg/m2): 30.1  #DM Hemoglobin A1C, Whole Blood: 11.2 (12-27-18 @ 06:07)    RECOMMENDATIONS  - Plan for PICC as no plan for surgical intervention per Podiatry x 6 weeks to end 12/27  - D/C VANC  - CHANGE cefepime to Ceftriaxone 2g q24h IV   - CHANGE to PO flagyl 500mg BID   - Weekly CBC, BMP  - ID follow-up with America Turpin 12/10       [id number]       [phone] (call to make an appointment, walk-ins Tuesdays 10:30 AM)      Fax [phone]       Spectra [phone]

## 2019-11-19 NOTE — PROGRESS NOTE ADULT - ASSESSMENT
68 yo M with PMH of insulin-dependent diabetes mellitus, Diabetic foot ulcer s/p R great toe amputation and Partial Hallux Amputation Left Foot was sent to the ED from podiatry office Dr. Bhatia for worsening right lower ext diabetic foot ulcer.     Plan:   - Angiogram of RLE tomorrow   - pre-op today - 4pm cbc, bmp, mg, phos, coag, type and screen, cxr, ecg, npo after midnight

## 2019-11-19 NOTE — PROGRESS NOTE ADULT - SUBJECTIVE AND OBJECTIVE BOX
Patient Information:  JOSÉ MANUEL PORTER / 69y / Male / MRN#:[id number]    Hospital Day: 5d    HPI:  70 yo M with PMH of insulin-dependent diabetes mellitus , Diabetic foot ulcer s/p R great toe amputation and Partial Hallux Amputation Left Foot was sent to the ED from podiatry office Dr. Bhatia for worsening right lower ext diabetic foot ulcer. patient reports he had pain and feeling warm for only 1 day. he went to his podiatrist for follow up who upon evaluation referred the patient to the ED. patient also reports he stepped over a screw 3 weeks ago. denies discharge, fever , chills, nausea, vomiting or change in bowel or urinary habits.     In ED pt had tachycardia 109, febrile Tmax 100.7, /89.  patient received 3 L LR , zosyn and vanco.     Attd: here for eval of rt foot ulcer, which is getting larger in size; pt developed this after stepping on screw 3 wks ago (got Td vac); pt says rt foot and lower leg were very hot; he developed fever; his pod sent him in for IV abx and further eval for sx; pt is otherwise fine; no CP or SOB    Interval History:  Patient seen and examined at bedside. No acute events overnight.    Past Medical History:  GERD (gastroesophageal reflux disease)  Depression  Diabetic foot ulcer  Dyslipidemia  HTN (hypertension)  DM (diabetes mellitus)    Past Surgical History:  Toe amputation status, right    Allergies:  No Known Allergies    Medications:  PRN:  dextrose 40% Gel 15 Gram(s) Oral once PRN Blood Glucose LESS THAN 70 milliGRAM(s)/deciliter  glucagon  Injectable 1 milliGRAM(s) IntraMuscular once PRN Glucose LESS THAN 70 milligrams/deciliter    Standing:  aspirin enteric coated 81 milliGRAM(s) Oral daily  atorvastatin 20 milliGRAM(s) Oral at bedtime  cefTRIAXone   IVPB 2000 milliGRAM(s) IV Intermittent every 24 hours  chlorhexidine 4% Liquid 1 Application(s) Topical <User Schedule>  dextrose 5%. 1000 milliLiter(s) (50 mL/Hr) IV Continuous <Continuous>  dextrose 50% Injectable 12.5 Gram(s) IV Push once  dextrose 50% Injectable 25 Gram(s) IV Push once  dextrose 50% Injectable 25 Gram(s) IV Push once  insulin glargine Injectable (LANTUS) 38 Unit(s) SubCutaneous at bedtime  insulin lispro (HumaLOG) corrective regimen sliding scale   SubCutaneous three times a day before meals  insulin lispro Injectable (HumaLOG) 13 Unit(s) SubCutaneous three times a day before meals  lisinopril 40 milliGRAM(s) Oral daily  magnesium oxide 400 milliGRAM(s) Oral daily  metroNIDAZOLE    Tablet 500 milliGRAM(s) Oral every 12 hours  NIFEdipine XL 60 milliGRAM(s) Oral daily  pantoprazole    Tablet 40 milliGRAM(s) Oral before breakfast  sertraline 50 milliGRAM(s) Oral daily    Home:  benazepril 40 mg oral tablet: 1 tab(s) orally once a day  Crestor 10 mg oral tablet: 1 tab(s) orally once a day (at bedtime)  HumuLIN 70/30 KwikPen 70 units-30 units/mL subcutaneous suspension: 50 unit(s) subcutaneous once a day before breakfast  HumuLIN 70/30 subcutaneous suspension: 40 unit(s) subcutaneous once a day (at bedtime)  Janumet 50 mg-1000 mg oral tablet: 1 tab(s) orally 2 times a day  omeprazole 20 mg oral delayed release capsule: 1 cap(s) orally once a day  sertraline 50 mg oral tablet: 1 tab(s) orally once a day    Vitals:  T(C): 36.6, Max: 37.8 (11-18-19 @ 19:28)  T(F): 97.9, Max: 100 (11-18-19 @ 19:28)  HR: 84 (74 - 91)  BP: 127/60 (98/49 - 141/68)  RR: 18 (18 - 18)  SpO2: 93% (93% - 93%)    Physical Exam:  General: Awake, Alert. Not in acute distress.  Heart: Regular rate and rhythm; S1, S2; No murmurs.  Lungs: Clear to auscultation bilaterally.  Abdomen: Soft, nontender, nondistended.  Extremities: No edema in upper or lower extremities.  Neuro: AAOx3, No focal deficits.    Labs:  CBC (11-19 @ 06:32)                        Hgb: 10.1   WBC: 10.62 )-----------------( Plts: 303                              Hct: 30.7     Chem (11-19 @ 06:32)  Na: 141  |     Cl: 102     |  BUN: 17  -----------------------------------------< Gluc: 76    K: 4.5   |    HCO3: 27  |  Cr: 1.1    Ca 8.5 (11-19 @ 06:32)  Mg 2.0 (11-19 @ 06:32)    LFTs (11-19 @ 06:32)  TPro 5.4  /  Alb 3.0  TBili 0.3  /  DBili     AST 20  /  ALT 15  /  AlkPhos 103    PT/INR (11-18 @ 06:50)  PT: 16.90; INR: 1.48   PTT:                    Microbiology:  Culture - Urine (collected 11-15 @ 10:20)  Source: .Urine Clean Catch (Midstream)  Final Report (11-16 @ 16:39):    No growth    Culture - Other (collected 11-14 @ 20:40)  Source: .Other Right Foot Wound  Final Report (11-18 @ 15:20):    Rare Staphylococcus aureus    Normal skin devendra isolated  Organism: Staphylococcus aureus (11-18 @ 15:20)  Organism: Staphylococcus aureus (11-18 @ 15:20)    Culture - Blood (collected 11-14 @ 17:03)  Source: .Blood Blood  Preliminary Report (11-15 @ 23:02):    No growth to date.    Culture - Blood (collected 11-14 @ 17:03)  Source: .Blood Blood  Preliminary Report (11-15 @ 23:02):    No growth to date.        Radiology:

## 2019-11-19 NOTE — PROGRESS NOTE ADULT - SUBJECTIVE AND OBJECTIVE BOX
GENERAL SURGERY PROGRESS NOTE     JOSÉ MANUEL PORTER  69y  Male  Hospital day :5d  POD:  Procedure:   OVERNIGHT EVENTS: No acute events.     T(F): 99.7 (11-19-19 @ 04:36), Max: 100 (11-18-19 @ 19:28)  HR: 74 (11-19-19 @ 04:36) (74 - 91)  BP: 98/49 (11-19-19 @ 04:36) (98/49 - 141/92)  ABP: --  ABP(mean): --  RR: 18 (11-19-19 @ 04:36) (18 - 18)  SpO2: 93% (11-18-19 @ 20:29) (93% - 93%)    DIET/FLUIDS: dextrose 5%. 1000 milliLiter(s) IV Continuous <Continuous>  magnesium oxide 400 milliGRAM(s) Oral daily       GI proph:  pantoprazole    Tablet 40 milliGRAM(s) Oral before breakfast    AC/ proph:   ABx: cefepime   IVPB 2000 milliGRAM(s) IV Intermittent every 8 hours  metroNIDAZOLE  IVPB 500 milliGRAM(s) IV Intermittent every 8 hours      PHYSICAL EXAM:  GENERAL: NAD, well-appearing  CHEST/LUNG: Clear to auscultation bilaterally  HEART: Regular rate and rhythm  ABDOMEN: Soft, Nontender, Nondistended;   EXTREMITIES:  Dopplerable pulses DP / PT bilaterally, No clubbing, cyanosis, or edema      LABS  Labs:  CAPILLARY BLOOD GLUCOSE      POCT Blood Glucose.: 101 mg/dL (19 Nov 2019 01:58)  POCT Blood Glucose.: 129 mg/dL (18 Nov 2019 21:22)  POCT Blood Glucose.: 241 mg/dL (18 Nov 2019 16:29)  POCT Blood Glucose.: 137 mg/dL (18 Nov 2019 11:30)  POCT Blood Glucose.: 92 mg/dL (18 Nov 2019 08:15)                          10.0   10.96 )-----------( 273      ( 18 Nov 2019 06:50 )             30.3       Auto Neutrophil %: 81.0 % (11-18-19 @ 06:50)  Auto Immature Granulocyte %: 0.8 % (11-18-19 @ 06:50)    11-18    139  |  102  |  13  ----------------------------<  63<L>  4.3   |  22  |  1.1      Calcium, Total Serum: 8.3 mg/dL (11-18-19 @ 06:50)      LFTs:             5.4  | 0.8  | 16       ------------------[98      ( 18 Nov 2019 06:50 )  3.1  | x    | 15          Lipase:x      Amylase:x             Coags:     16.90  ----< 1.48    ( 18 Nov 2019 06:50 )     x               RADIOLOGY & ADDITIONAL TESTS:  < from: MR Foot w/ IV Cont, Right (11.15.19 @ 22:49) >  IMPRESSION:  1. Medial and metatarsal fat pad ulcer with adjacent phlegmon/tissue   ischemia as detailed above  2.Third MTP septic arthritis, third metatarsal head and proximal   phalangeal base osteomyelitis  3. Second metatarsal neck osteomyelitis; second metatarsal head chronic   AVN  4. Third distal flexor tenosynovitis with enhancement, consistent with   septic arthritis      < end of copied text >

## 2019-11-19 NOTE — PROCEDURE NOTE - NSPOSTPRCRAD_GEN_A_CORE
line in appropriate postion/depth of insertion/fluoroscopy/line adjusted to depth of insertion/chest radiograph

## 2019-11-19 NOTE — PROGRESS NOTE ADULT - SUBJECTIVE AND OBJECTIVE BOX
JOSÉ MANUEL PORTER  69y, Male  Allergy: No Known Allergies      CHIEF COMPLAINT: diabetic foot ulcer (19 Nov 2019 05:40)      INTERVAL EVENTS/HPI  - No acute events overnight, WCX MSSA  - T(F): , Max: 100 (11-18-19 @ 19:28)  - Denies any worsening symptoms  - Tolerating medication  - WBC Count: 10.62 (11-19-19 @ 06:32)  WBC Count: 10.96 (11-18-19 @ 06:50)  - Creatinine, Serum: 1.1 (11-19-19 @ 06:32)  Creatinine, Serum: 1.1 (11-18-19 @ 06:50)       ROS  General: Denies rigors, nightsweats  HEENT: Denies headache, rhinorrhea, sore throat, eye pain  CV: Denies CP, palpitations  PULM: Denies wheezing, hemoptysis  GI: Denies hematemesis, hematochezia, melena  : Denies discharge, hematuria  MSK: Denies arthralgias, myalgias  SKIN: Denies rash, lesions  NEURO: Denies paresthesias, weakness  PSYCH: Denies depression, anxiety    VITALS:  T(F): 99.7, Max: 100 (11-18-19 @ 19:28)  HR: 76  BP: 129/60  RR: 18Vital Signs Last 24 Hrs  T(C): 37.6 (19 Nov 2019 04:36), Max: 37.8 (18 Nov 2019 19:28)  T(F): 99.7 (19 Nov 2019 04:36), Max: 100 (18 Nov 2019 19:28)  HR: 76 (19 Nov 2019 05:50) (74 - 91)  BP: 129/60 (19 Nov 2019 05:50) (98/49 - 141/92)  BP(mean): --  RR: 18 (19 Nov 2019 04:36) (18 - 18)  SpO2: 93% (18 Nov 2019 20:29) (93% - 93%)    PHYSICAL EXAM:  Gen: NAD, resting in bed  HEENT: Normocephalic, atraumatic  Neck: supple, no lymphadenopathy  CV: Regular rate & regular rhythm  Lungs: decreased BS at bases, no fremitus  Abdomen: Soft, BS present  Ext: Warm, well perfused, RLE dressings  Neuro: non focal, awake  Skin: no rash, no erythema  Lines: no phlebitis      FH: Non-contributory  Social Hx: Non-contributory    TESTS & MEASUREMENTS:                        10.1   10.62 )-----------( 303      ( 19 Nov 2019 06:32 )             30.7     11-19    141  |  102  |  17  ----------------------------<  76  4.5   |  27  |  1.1    Ca    8.5      19 Nov 2019 06:32  Mg     2.0     11-19    TPro  5.4<L>  /  Alb  3.0<L>  /  TBili  0.3  /  DBili  x   /  AST  20  /  ALT  15  /  AlkPhos  103  11-19    eGFR if Non African American: 67 mL/min/1.73M2 (11-19-19 @ 06:32)  eGFR if African American: 78 mL/min/1.73M2 (11-19-19 @ 06:32)    LIVER FUNCTIONS - ( 19 Nov 2019 06:32 )  Alb: 3.0 g/dL / Pro: 5.4 g/dL / ALK PHOS: 103 U/L / ALT: 15 U/L / AST: 20 U/L / GGT: x               Culture - Urine (collected 11-15-19 @ 10:20)  Source: .Urine Clean Catch (Midstream)  Final Report (11-16-19 @ 16:39):    No growth    Culture - Other (collected 11-14-19 @ 20:40)  Source: .Other Right Foot Wound  Final Report (11-18-19 @ 15:20):    Rare Staphylococcus aureus    Normal skin devendra isolated  Organism: Staphylococcus aureus (11-18-19 @ 15:20)  Organism: Staphylococcus aureus (11-18-19 @ 15:20)      -  Ampicillin/Sulbactam: I 16/8      -  Cefazolin: S <=4      -  Clindamycin: S 0.5      -  Erythromycin: R >4      -  Gentamicin: S <=1 Should not be used as monotherapy      -  Oxacillin: S 1      -  Penicillin: R >8      -  RIF- Rifampin: S <=1 Should not be used as monotherapy      -  Tetra/Doxy: S <=1      -  Trimethoprim/Sulfamethoxazole: S <=0.5/9.5      -  Vancomycin: S 2      Method Type: LINDSEY    Culture - Blood (collected 11-14-19 @ 17:03)  Source: .Blood Blood  Preliminary Report (11-15-19 @ 23:02):    No growth to date.    Culture - Blood (collected 11-14-19 @ 17:03)  Source: .Blood Blood  Preliminary Report (11-15-19 @ 23:02):    No growth to date.        Blood Gas Venous - Lactate: 1.8 mmoL/L (11-14-19 @ 18:12)      INFECTIOUS DISEASES TESTING      RADIOLOGY & ADDITIONAL TESTS:  I have personally reviewed the last Chest xray  CXR      CT      CARDIOLOGY TESTING      MEDICATIONS  aspirin enteric coated 81  atorvastatin 20  cefepime   IVPB 2000  chlorhexidine 4% Liquid 1  dextrose 5%. 1000  dextrose 50% Injectable 12.5  dextrose 50% Injectable 25  dextrose 50% Injectable 25  insulin glargine Injectable (LANTUS) 45  insulin lispro (HumaLOG) corrective regimen sliding scale   insulin lispro Injectable (HumaLOG) 13  lisinopril 40  magnesium oxide 400  metroNIDAZOLE  IVPB 500  NIFEdipine XL 60  pantoprazole    Tablet 40  sertraline 50      ANTIBIOTICS:  cefepime   IVPB 2000 milliGRAM(s) IV Intermittent every 8 hours  metroNIDAZOLE  IVPB 500 milliGRAM(s) IV Intermittent every 8 hours      All available historical records have been reviewed

## 2019-11-19 NOTE — PROGRESS NOTE ADULT - ASSESSMENT
70 yo M with PMH of insulin-dependent diabetes mellitus , Diabetic foot ulcer s/p R great toe amputation and Partial Hallux Amputation Left Foot was sent to the ED from podiatry office Dr. Bhatia for worsening right lower ext diabetic foot ulcer. In the ED he was febrile to 100.7 and tachycardic to 109. Labs revealed leukocytosis to 12.89 and elevated inflammatory markers with ESR 49 and CRP 15.52.    # Sepsis on admission secondary to RIGHT diabetic planter foot ulcer s/p trauma by screw:  - MRI report from 11/15 reveals metatarsal osteomyelitis and septic arthritis   - Wound Cx growing rare staph aureus with normal skin devendra > sensitive to Oxacillin  - Wound culture from left foot on previous admission: 8/13/2019: Escherichia coli sensitive to cefepime, Klebsiella varicola sensitive to cefepime, Enterococcus faecalis sensitive to Unasyn and vanco  - Blood Cx negative  - Podiatry is following: No surgical intervention  - ID: Plan for PICC as no plan for surgical intervention per Podiatry x 6 weeks to end 12/27  - D/c cefepime and vancomycin, Switch to oral flagyl and IV ceftriaxone, Weekly CBC, BMP, ID follow-up with America Turpin 12/10   - Patient received tetanus shot on previous admission    # PVD  - Follows vascular as Outpt, Was recommended angiogram as Outpt after previous d/c in Aug.   - EKG and CXR preformed for clearance  - Duplex repeated 11/15 > moderate right tibial disease with no evidence of hemodynamically significant disease in LLE  - RLE angiogram scheduled for tomorrow, pre-op today, NPO after midnight, CXR & ECG done  - F/u 4pm cbc, bmp, mg, phos, coag, type and screen    # Hypomagnesemia  - 1.7 yesterday 11/18  - Supplemented   - 2 today    # Constipation  - Patient had not passed stool since Thursday  - Lactulose PO ordered stat > Passed stool yesterday    # Normocytic Anemia  - Likely Anemia of Chronic Disease  - Hb 10.1 today    # Insulin-dependent diabetes mellitus:  - Decreased to Lantus 38 and Lispro 13 along with sliding scale  - F/u FS    # HTN urgency:  - Controlled  - c/w Lisinopril and Nifedipine    # DLD:  - c/w statin    DVT ppx: Lovenox  GI: PPI   Diet: CHO consistent  Dispo: from home 68 yo M with PMH of insulin-dependent diabetes mellitus , Diabetic foot ulcer s/p R great toe amputation and Partial Hallux Amputation Left Foot was sent to the ED from podiatry office Dr. Bhatia for worsening right lower ext diabetic foot ulcer. In the ED he was febrile to 100.7 and tachycardic to 109. Labs revealed leukocytosis to 12.89 and elevated inflammatory markers with ESR 49 and CRP 15.52.    # Sepsis on admission secondary to RIGHT diabetic planter foot ulcer s/p trauma by screw:  - MRI report from 11/15 reveals metatarsal osteomyelitis and septic arthritis   - Wound Cx growing rare staph aureus with normal skin devendra > sensitive to Oxacillin  - Wound culture from left foot on previous admission: 8/13/2019: Escherichia coli sensitive to cefepime, Klebsiella varicola sensitive to cefepime, Enterococcus faecalis sensitive to Unasyn and vanco  - Blood Cx negative  - Podiatry is following: No surgical intervention  - ID: Plan for PICC as no plan for surgical intervention per Podiatry x 6 weeks to end 12/27  - D/c cefepime and vancomycin, Switch to oral flagyl and IV ceftriaxone, Weekly CBC, BMP, ID follow-up with America Turpin 12/10   - Consult for PICC placed 11/18/19  - Patient received tetanus shot on previous admission    # PVD  - Follows vascular as Outpt, Was recommended angiogram as Outpt after previous d/c in Aug.   - EKG and CXR preformed for clearance  - Duplex repeated 11/15 > moderate right tibial disease with no evidence of hemodynamically significant disease in LLE  - RLE angiogram scheduled for tomorrow, pre-op today, NPO after midnight, CXR & ECG done  - F/u 4pm cbc, bmp, mg, phos, coag, type and screen    # Hypomagnesemia  - 1.7 yesterday 11/18  - Supplemented   - 2 today    # Constipation  - Patient had not passed stool since Thursday  - Lactulose PO ordered stat > Passed stool yesterday    # Normocytic Anemia  - Likely Anemia of Chronic Disease  - Hb 10.1 today    # Insulin-dependent diabetes mellitus:  - Decreased to Lantus 38 and Lispro 13 along with sliding scale  - F/u FS    # HTN urgency:  - Controlled  - c/w Lisinopril and Nifedipine    # DLD:  - c/w statin    DVT ppx: Lovenox  GI: PPI   Diet: CHO consistent  Dispo: from home

## 2019-11-20 LAB
GLUCOSE BLDC GLUCOMTR-MCNC: 101 MG/DL — HIGH (ref 70–99)
GLUCOSE BLDC GLUCOMTR-MCNC: 102 MG/DL — HIGH (ref 70–99)
GLUCOSE BLDC GLUCOMTR-MCNC: 113 MG/DL — HIGH (ref 70–99)
GLUCOSE BLDC GLUCOMTR-MCNC: 129 MG/DL — HIGH (ref 70–99)
GLUCOSE BLDC GLUCOMTR-MCNC: 242 MG/DL — HIGH (ref 70–99)

## 2019-11-20 PROCEDURE — 75710 ARTERY X-RAYS ARM/LEG: CPT | Mod: 26

## 2019-11-20 PROCEDURE — 76937 US GUIDE VASCULAR ACCESS: CPT | Mod: 26

## 2019-11-20 PROCEDURE — 36247 INS CATH ABD/L-EXT ART 3RD: CPT

## 2019-11-20 PROCEDURE — 99232 SBSQ HOSP IP/OBS MODERATE 35: CPT

## 2019-11-20 RX ORDER — SODIUM CHLORIDE 9 MG/ML
1000 INJECTION, SOLUTION INTRAVENOUS
Refills: 0 | Status: DISCONTINUED | OUTPATIENT
Start: 2019-11-20 | End: 2019-11-21

## 2019-11-20 RX ORDER — SERTRALINE 25 MG/1
50 TABLET, FILM COATED ORAL DAILY
Refills: 0 | Status: DISCONTINUED | OUTPATIENT
Start: 2019-11-20 | End: 2019-11-21

## 2019-11-20 RX ORDER — GLUCAGON INJECTION, SOLUTION 0.5 MG/.1ML
1 INJECTION, SOLUTION SUBCUTANEOUS ONCE
Refills: 0 | Status: DISCONTINUED | OUTPATIENT
Start: 2019-11-20 | End: 2019-11-21

## 2019-11-20 RX ORDER — CHLORHEXIDINE GLUCONATE 213 G/1000ML
1 SOLUTION TOPICAL
Refills: 0 | Status: DISCONTINUED | OUTPATIENT
Start: 2019-11-20 | End: 2019-11-21

## 2019-11-20 RX ORDER — NIFEDIPINE 30 MG
60 TABLET, EXTENDED RELEASE 24 HR ORAL DAILY
Refills: 0 | Status: DISCONTINUED | OUTPATIENT
Start: 2019-11-20 | End: 2019-11-21

## 2019-11-20 RX ORDER — LISINOPRIL 2.5 MG/1
40 TABLET ORAL DAILY
Refills: 0 | Status: DISCONTINUED | OUTPATIENT
Start: 2019-11-20 | End: 2019-11-21

## 2019-11-20 RX ORDER — MAGNESIUM OXIDE 400 MG ORAL TABLET 241.3 MG
400 TABLET ORAL DAILY
Refills: 0 | Status: DISCONTINUED | OUTPATIENT
Start: 2019-11-20 | End: 2019-11-21

## 2019-11-20 RX ORDER — ASPIRIN/CALCIUM CARB/MAGNESIUM 324 MG
81 TABLET ORAL DAILY
Refills: 0 | Status: DISCONTINUED | OUTPATIENT
Start: 2019-11-20 | End: 2019-11-21

## 2019-11-20 RX ORDER — PANTOPRAZOLE SODIUM 20 MG/1
40 TABLET, DELAYED RELEASE ORAL
Refills: 0 | Status: DISCONTINUED | OUTPATIENT
Start: 2019-11-20 | End: 2019-11-21

## 2019-11-20 RX ORDER — CEFTRIAXONE 500 MG/1
1000 INJECTION, POWDER, FOR SOLUTION INTRAMUSCULAR; INTRAVENOUS EVERY 24 HOURS
Refills: 0 | Status: DISCONTINUED | OUTPATIENT
Start: 2019-11-20 | End: 2019-11-21

## 2019-11-20 RX ORDER — METRONIDAZOLE 500 MG
500 TABLET ORAL EVERY 12 HOURS
Refills: 0 | Status: DISCONTINUED | OUTPATIENT
Start: 2019-11-20 | End: 2019-11-21

## 2019-11-20 RX ORDER — MORPHINE SULFATE 50 MG/1
2 CAPSULE, EXTENDED RELEASE ORAL
Refills: 0 | Status: DISCONTINUED | OUTPATIENT
Start: 2019-11-20 | End: 2019-11-20

## 2019-11-20 RX ORDER — ATORVASTATIN CALCIUM 80 MG/1
20 TABLET, FILM COATED ORAL AT BEDTIME
Refills: 0 | Status: DISCONTINUED | OUTPATIENT
Start: 2019-11-20 | End: 2019-11-21

## 2019-11-20 RX ORDER — SODIUM CHLORIDE 9 MG/ML
1000 INJECTION INTRAMUSCULAR; INTRAVENOUS; SUBCUTANEOUS
Refills: 0 | Status: DISCONTINUED | OUTPATIENT
Start: 2019-11-20 | End: 2019-11-20

## 2019-11-20 RX ORDER — ONDANSETRON 8 MG/1
4 TABLET, FILM COATED ORAL ONCE
Refills: 0 | Status: DISCONTINUED | OUTPATIENT
Start: 2019-11-20 | End: 2019-11-20

## 2019-11-20 RX ADMIN — MAGNESIUM OXIDE 400 MG ORAL TABLET 400 MILLIGRAM(S): 241.3 TABLET ORAL at 16:30

## 2019-11-20 RX ADMIN — LISINOPRIL 40 MILLIGRAM(S): 2.5 TABLET ORAL at 05:18

## 2019-11-20 RX ADMIN — CHLORHEXIDINE GLUCONATE 1 APPLICATION(S): 213 SOLUTION TOPICAL at 05:19

## 2019-11-20 RX ADMIN — Medication 81 MILLIGRAM(S): at 16:31

## 2019-11-20 RX ADMIN — SERTRALINE 50 MILLIGRAM(S): 25 TABLET, FILM COATED ORAL at 16:31

## 2019-11-20 RX ADMIN — CEFTRIAXONE 100 MILLIGRAM(S): 500 INJECTION, POWDER, FOR SOLUTION INTRAMUSCULAR; INTRAVENOUS at 16:31

## 2019-11-20 RX ADMIN — Medication 500 MILLIGRAM(S): at 05:18

## 2019-11-20 RX ADMIN — ATORVASTATIN CALCIUM 20 MILLIGRAM(S): 80 TABLET, FILM COATED ORAL at 22:36

## 2019-11-20 RX ADMIN — SODIUM CHLORIDE 75 MILLILITER(S): 9 INJECTION INTRAMUSCULAR; INTRAVENOUS; SUBCUTANEOUS at 11:29

## 2019-11-20 RX ADMIN — Medication 60 MILLIGRAM(S): at 05:18

## 2019-11-20 RX ADMIN — Medication 500 MILLIGRAM(S): at 16:30

## 2019-11-20 NOTE — PROGRESS NOTE ADULT - SUBJECTIVE AND OBJECTIVE BOX
Patient Information:  JOSÉ MANUEL PORTER / 69y / Male / MRN#:[id number]    Hospital Day: 6d    HPI:  70 yo M with PMH of insulin-dependent diabetes mellitus , Diabetic foot ulcer s/p R great toe amputation and Partial Hallux Amputation Left Foot was sent to the ED from podiatry office Dr. Bhatia for worsening right lower ext diabetic foot ulcer. patient reports he had pain and feeling warm for only 1 day. he went to his podiatrist for follow up who upon evaluation referred the patient to the ED. patient also reports he stepped over a screw 3 weeks ago. denies discharge, fever , chills, nausea, vomiting or change in bowel or urinary habits.     In ED pt had tachycardia 109, febrile Tmax 100.7, /89.  patient received 3 L LR , zosyn and vanco.     Attd: here for eval of rt foot ulcer, which is getting larger in size; pt developed this after stepping on screw 3 wks ago (got Td vac); pt says rt foot and lower leg were very hot; he developed fever; his pod sent him in for IV abx and further eval for sx; pt is otherwise fine; no CP or SOB    Interval History:  Patient seen and examined at bedside. No acute events overnight.    Past Medical History:  GERD (gastroesophageal reflux disease)  Depression  Diabetic foot ulcer  Dyslipidemia  HTN (hypertension)  DM (diabetes mellitus)    Past Surgical History:  Toe amputation status, right    Allergies:  No Known Allergies    Medications:  PRN:    Standing:    Home:  benazepril 40 mg oral tablet: 1 tab(s) orally once a day  Crestor 10 mg oral tablet: 1 tab(s) orally once a day (at bedtime)  HumuLIN 70/30 KwikPen 70 units-30 units/mL subcutaneous suspension: 50 unit(s) subcutaneous once a day before breakfast  HumuLIN 70/30 subcutaneous suspension: 40 unit(s) subcutaneous once a day (at bedtime)  Janumet 50 mg-1000 mg oral tablet: 1 tab(s) orally 2 times a day  omeprazole 20 mg oral delayed release capsule: 1 cap(s) orally once a day  sertraline 50 mg oral tablet: 1 tab(s) orally once a day    Vitals:  T(C): 37.8, Max: 37.8 (11-20-19 @ 04:45)  T(F): 100, Max: 100 (11-20-19 @ 04:45)  HR: 71 (71 - 84)  BP: 136/63 (126/100 - 136/63)  RR: 18 (18 - 18)  SpO2: 92% (92% - 92%)    Physical Exam:  General: Awake, Alert. Not in acute distress.  Heart: Regular rate and rhythm; S1, S2; No murmurs.  Lungs: Clear to auscultation bilaterally.  Abdomen: Soft, nontender, nondistended.  Extremities: R LE Dressed, Partial Hallux amputation on left  Neuro: AAOx3, No focal deficits.    Labs:  CBC (11-19 @ 17:50)                        Hgb: 11.0   WBC: 11.72 )-----------------( Plts: 349                              Hct: 34.1     Chem (11-19 @ 17:50)  Na: 140  |     Cl: 102     |  BUN: 19  -----------------------------------------< Gluc: 212    K: 4.9   |    HCO3: 26  |  Cr: 1.2    Ca 8.6 (11-19 @ 17:50)  Phos 3.3 (11-19 @ 17:50)  Mg 2.1 (11-19 @ 17:50)    LFTs (11-19 @ 06:32)  TPro 5.4  /  Alb 3.0  TBili 0.3  /  DBili     AST 20  /  ALT 15  /  AlkPhos 103    PT/INR (11-19 @ 17:50)  PT: 17.10; INR: 1.49   PTT: 31.4               Microbiology:  Culture - Urine (collected 11-15 @ 10:20)  Source: .Urine Clean Catch (Midstream)  Final Report (11-16 @ 16:39):    No growth    Culture - Other (collected 11-14 @ 20:40)  Source: .Other Right Foot Wound  Final Report (11-18 @ 15:20):    Rare Staphylococcus aureus    Normal skin devendra isolated  Organism: Staphylococcus aureus (11-18 @ 15:20)  Organism: Staphylococcus aureus (11-18 @ 15:20)    Culture - Blood (collected 11-14 @ 17:03)  Source: .Blood Blood  Final Report (11-19 @ 23:01):    No growth at 5 days.    Culture - Blood (collected 11-14 @ 17:03)  Source: .Blood Blood  Final Report (11-19 @ 23:01):    No growth at 5 days.        Radiology:    < from: MR Foot w/ IV Cont, Right (11.15.19 @ 22:49) >  IMPRESSION:  1. Medial and metatarsal fat pad ulcer with adjacent phlegmon/tissue   ischemia as detailed above  2.Third MTP septic arthritis, third metatarsal head and proximal   phalangeal base osteomyelitis  3. Second metatarsal neck osteomyelitis; second metatarsal head chronic   AVN  4. Third distal flexor tenosynovitis with enhancement, consistent with   septic arthritis    < end of copied text >

## 2019-11-20 NOTE — CHART NOTE - NSCHARTNOTEFT_GEN_A_CORE
PACU ANESTHESIA ADMISSION NOTE      Procedure: Angiogram, extremity, right    Post op diagnosis:      ____  Intubated  TV:______       Rate: ______      FiO2: ______    _x___  Patent Airway    _x___  Full return of protective reflexes    _x___  Full recovery from anesthesia / back to baseline status    Vitals:  T-98.5  HR:84  BP:102/54  RR: 21  SpO2: 99    Mental Status:  _x___ Awake   _____ Alert   _____ Drowsy   _____ Sedated    Nausea/Vomiting:  _x___  NO       ______Yes,   See Post - Op Orders         Pain Scale (0-10):  __0___    Treatment: _x___ None    ____ See Post - Op/PCA Orders    Post - Operative Fluids:   __x__ Oral   ____ See Post - Op Orders    Plan: Discharge:   ____Home       _____xFloor     _____Critical Care    _____  Other:_________________    Comments: Pt bought to RR spontaneously breathing, hemodynamically stable    No anesthesia issues or complications noted.  Discharge when criteria met.

## 2019-11-20 NOTE — DIETITIAN INITIAL EVALUATION ADULT. - FEEDING SKILL
independent/Pt currently NPO as he just came back from angiogram; however prior to that was consuming 100% of his meal trays w/o any issues. Eagerly awaiting for diet order to be changed; RN aware.

## 2019-11-20 NOTE — DIETITIAN INITIAL EVALUATION ADULT. - NAME AND PHONE
Pt to maintain 100% po intake of meals and snacks over the next 7 days. RD to monitor energy intake, glucose profile, body composition, nutrition focused physical findings.

## 2019-11-20 NOTE — PROGRESS NOTE ADULT - ATTENDING COMMENTS
will see patient on monday morning rounds
no surgical intervention at this time; resection off 2nd and 3rd met heads will rapidly lead to ulcerations underneath the 4th metatarsal. If surgical intervention is considered, patient would fare better with a transmetetarsal amputation    non-operative care with IV antibiotics, total contact cast and hyperbaric oxygen.  will monitor patient with serial xrays
patient seen and examined independently on morning rounds for the first time today, chart reviewed and discussed with the medicine resident and on interdisciplinary rounds and agree with the above resident progress note with the following addendum:    no overnight events---patient appears comfortable on the phone- awaiting IR Picc placement today and then plan for RLE angiogram tomorrow (11/20/19)    PE:  GEN-NAD, AAOx3  PULM- fair air entry  CVS- +s1/s2 RRR no murmurs  GI- soft NT obese ND +bs  EXT- RLE dressing    labs/radiology reviewed    a/p:  #Sepsis 2/2 DFU (plantar) after trauma (stepped on screw) and PVD  -MRI shows metatarsal OM and septic arthritis  -awaiting PICC placement (IR today) for long term abx (6 weeks)---ID following--cont iv ceftriaxone- will need to monitor outpatient labs weekly  -no plan for surgical debridement- f/u with podiatry  -awatiing angiogram 11/20--f/u with vascular team for PVD  -local wound care--leg elevation    continue current mangament of chronic med issues  DVT/GI ppx    guarded prognosis
patient seen and examined independently on rounds after returning from OR (went for angiogram), chart reviewed and discussed with the medicine resident and on interdisciplinary rounds and agree with the above resident progress note with the following addendum:    no overnight events---feels well- RUE picc line working well--site c/d/i- returned to floor after angiogram--awaiting f/u from vascular team---anticipate likely dc home tomorrow on 6 wks iv abx     PE:  GEN-NAD, AAOx3  PULM- fair air entry  CVS- +s1/s2 RRR no murmurs  GI- soft NT obese ND +bs  EXT- RLE dressing, RUE picc site c/d/i    labs/radiology reviewed    a/p:  #Sepsis 2/2 DFU (plantar) after trauma (stepped on screw) and PVD  -MRI shows metatarsal OM and septic arthritis  -s/p PICC placement  for long term abx (6 weeks)---ID following--cont iv ceftriaxone- will need to monitor outpatient labs weekly  -no plan for surgical debridement- f/u with podiatry  -s/p angiogram today 11/20--f/u with vascular team  -local wound care--leg elevation    continue current mangament of chronic med issues  DVT/GI ppx    guarded prognosis   anticipate likely dc home in next 24-48 hrs (likely in am once home abx delivered and hc arrangements made)
#Sepsis, present on admission, due to diabetic foot ulcer R c/b osteo, septic arthritis  febrile overnight, repeat bcx  wcx staph aureus, pending sensitivities  vanco, cefepime, flagyl per id  plan for picc today  no surgical interventions planned  PT  pain control  #PVD  R tibial stenosis noted on arterial duplex  plan for angio wednesday  f/u vasc    #Progress Note Handoff:  Pending (specify):  Consults_________, Tests________, Test Results_______, Other_____picc____  Family discussion:d/w pt at bedside re: treatment plan, primary dx  Disposition: Home___/SNF___/Other________/Unknown at this time_____x___

## 2019-11-20 NOTE — DIETITIAN INITIAL EVALUATION ADULT. - ENERGY NEEDS
Calories: 3577-1938 kcal/day (MSJ x 1-1.1 AF)--obese BMI considered  Protein: 78-94 gm/day (1-1.2 gm/kg IBW)  Fluid: 1 ml/kcal

## 2019-11-20 NOTE — PROGRESS NOTE ADULT - ASSESSMENT
68 yo M with PMH of insulin-dependent diabetes mellitus , Diabetic foot ulcer s/p R great toe amputation and Partial Hallux Amputation Left Foot was sent to the ED from podiatry office Dr. Bhatai for worsening right lower ext diabetic foot ulcer after stepping on screw. In the ED he was febrile to 100.7 and tachycardic to 109. Labs revealed leukocytosis to 12.89 and elevated inflammatory markers with ESR 49 and CRP 15.52.    # Sepsis on admission secondary to RIGHT diabetic planter foot ulcer s/p trauma by screw:  - MRI report from 11/15 reveals metatarsal osteomyelitis and septic arthritis   - Wound Cx growing rare staph aureus with normal skin devendra > sensitive to Oxacillin  - Wound culture from left foot on previous admission: 8/13/2019: Escherichia coli sensitive to cefepime, Klebsiella varicola sensitive to cefepime, Enterococcus faecalis sensitive to Unasyn and vanco  - Blood Cx negative  - Podiatry is following: No surgical intervention  - ID: Plan for PICC as no plan for surgical intervention per Podiatry x 6 weeks to end 12/27 > PICC placed yesterday  - D/c cefepime and vancomycin, Switch to oral flagyl and IV ceftriaxone, Weekly CBC, BMP, ID follow-up with david Turpin 12/10   - Patient received tetanus shot on previous admission    # PVD  - Follows vascular as Outpt, Was recommended angiogram as Outpt after previous d/c in Aug.   - Duplex repeated 11/15 > moderate right tibial disease with no evidence of hemodynamically significant disease in LLE  - RLE angiogram scheduled for today    # Hypomagnesemia  - Supplemented     # Constipation  - Patient had not passed stool since Thursday  - Lactulose PO ordered stat > Passed stool yesterday    # Normocytic Anemia  - Likely Anemia of Chronic Disease  - Hb 10.1 today    # Insulin-dependent diabetes mellitus:  - Decreased to Lantus 38 and Lispro 13 along with sliding scale  - FS improved    # HTN urgency:  - Controlled  - c/w Lisinopril and Nifedipine    # DLD:  - c/w statin    DVT ppx: Lovenox  GI: PPI   Diet: CHO consistent  Dispo: from home 68 yo M with PMH of insulin-dependent diabetes mellitus , Diabetic foot ulcer s/p R great toe amputation and Partial Hallux Amputation Left Foot was sent to the ED from podiatry office Dr. Bhatia for worsening right lower ext diabetic foot ulcer after stepping on screw. In the ED he was febrile to 100.7 and tachycardic to 109. Labs revealed leukocytosis to 12.89 and elevated inflammatory markers with ESR 49 and CRP 15.52.    # Sepsis on admission secondary to RIGHT diabetic planter foot ulcer s/p trauma by screw:  - MRI report from 11/15 reveals metatarsal osteomyelitis and septic arthritis   - Wound Cx growing rare staph aureus with normal skin devendra > sensitive to Oxacillin  - Wound culture from left foot on previous admission: 8/13/2019: Escherichia coli sensitive to cefepime, Klebsiella varicola sensitive to cefepime, Enterococcus faecalis sensitive to Unasyn and vanco  - Blood Cx negative  - Podiatry is following: No surgical intervention  - ID: Plan for PICC as no plan for surgical intervention per Podiatry x 6 weeks to end 12/27 > PICC placed yesterday  - D/c cefepime and vancomycin, Switch to oral flagyl and IV ceftriaxone, Weekly CBC, BMP, ID follow-up with david Turpin 12/10   - Patient received tetanus shot on previous admission    # PVD  - Follows vascular as Outpt, Was recommended angiogram as Outpt after previous d/c in Aug.   - Duplex repeated 11/15 > moderate right tibial disease with no evidence of hemodynamically significant disease in LLE  - s/p RLE angiogram today    # Hypomagnesemia  - Supplemented     # Constipation  - Patient had not passed stool since Thursday  - Lactulose PO ordered stat > Passed stool yesterday    # Normocytic Anemia  - Likely Anemia of Chronic Disease  - Hb 10.1 today    # Insulin-dependent diabetes mellitus:  - Decreased to Lantus 38 and Lispro 13 along with sliding scale  - FS improved    # HTN urgency:  - Controlled  - c/w Lisinopril and Nifedipine    # DLD:  - c/w statin    DVT ppx: Lovenox  GI: PPI   Diet: CHO consistent  Dispo: from home

## 2019-11-20 NOTE — DIETITIAN INITIAL EVALUATION ADULT. - PHYSICAL APPEARANCE
obese/alert and oriented. BMI: 31.1, IBW: 172#, UBW: ~216#, denies any recent wt loss. no edema noted, R DFU

## 2019-11-20 NOTE — PRE-OP CHECKLIST - SELECT TESTS ORDERED
Hepatic Function/Type and Screen/EKG/CXR/POCT Blood Glucose/BMP/PT/PTT/INR/CBC/Urinalysis 101 mg/dl at 0800/Hepatic Function/Urinalysis/EKG/BMP/CBC/INR/POCT Blood Glucose/PT/PTT/Type and Screen/CXR

## 2019-11-20 NOTE — DIETITIAN INITIAL EVALUATION ADULT. - OTHER INFO
Nutrition Hx PTA: Pt with excellent appetite/po intake, typically consumes 3 meals + snacks daily and denies use of oral nutrition supplements. Pt has no cultural/Congregation restrictions and has NKFA. Pt states he closely watches what he eats as well as practices portion control as to not overeat.    Pt was sent to the ED from podiatry office Dr. Bhatia for worsening right lower ext diabetic foot ulcer. Sepsis on admission secondary to R diabetic planter foot ulcer s/p trauma by screw: MRI report from 11/15 reveals metatarsal osteomyelitis and septic arthritis. ID: Plan for PICC as no plan for surgical intervention per Podiatry. Pt s/p RLE angiogram today.

## 2019-11-21 ENCOUNTER — TRANSCRIPTION ENCOUNTER (OUTPATIENT)
Age: 69
End: 2019-11-21

## 2019-11-21 ENCOUNTER — APPOINTMENT (OUTPATIENT)
Dept: PODIATRY | Facility: CLINIC | Age: 69
End: 2019-11-21

## 2019-11-21 VITALS
DIASTOLIC BLOOD PRESSURE: 61 MMHG | SYSTOLIC BLOOD PRESSURE: 129 MMHG | TEMPERATURE: 99 F | HEART RATE: 91 BPM | RESPIRATION RATE: 18 BRPM

## 2019-11-21 LAB
ALBUMIN SERPL ELPH-MCNC: 3.4 G/DL — LOW (ref 3.5–5.2)
ALP SERPL-CCNC: 125 U/L — HIGH (ref 30–115)
ALT FLD-CCNC: 14 U/L — SIGNIFICANT CHANGE UP (ref 0–41)
ANION GAP SERPL CALC-SCNC: 14 MMOL/L — SIGNIFICANT CHANGE UP (ref 7–14)
AST SERPL-CCNC: 23 U/L — SIGNIFICANT CHANGE UP (ref 0–41)
BASOPHILS # BLD AUTO: 0.04 K/UL — SIGNIFICANT CHANGE UP (ref 0–0.2)
BASOPHILS NFR BLD AUTO: 0.3 % — SIGNIFICANT CHANGE UP (ref 0–1)
BILIRUB SERPL-MCNC: 0.3 MG/DL — SIGNIFICANT CHANGE UP (ref 0.2–1.2)
BUN SERPL-MCNC: 17 MG/DL — SIGNIFICANT CHANGE UP (ref 10–20)
CALCIUM SERPL-MCNC: 8.8 MG/DL — SIGNIFICANT CHANGE UP (ref 8.5–10.1)
CHLORIDE SERPL-SCNC: 99 MMOL/L — SIGNIFICANT CHANGE UP (ref 98–110)
CO2 SERPL-SCNC: 24 MMOL/L — SIGNIFICANT CHANGE UP (ref 17–32)
CREAT SERPL-MCNC: 1.1 MG/DL — SIGNIFICANT CHANGE UP (ref 0.7–1.5)
EOSINOPHIL # BLD AUTO: 0.1 K/UL — SIGNIFICANT CHANGE UP (ref 0–0.7)
EOSINOPHIL NFR BLD AUTO: 0.8 % — SIGNIFICANT CHANGE UP (ref 0–8)
GLUCOSE BLDC GLUCOMTR-MCNC: 164 MG/DL — HIGH (ref 70–99)
GLUCOSE BLDC GLUCOMTR-MCNC: 181 MG/DL — HIGH (ref 70–99)
GLUCOSE BLDC GLUCOMTR-MCNC: 247 MG/DL — HIGH (ref 70–99)
GLUCOSE SERPL-MCNC: 176 MG/DL — HIGH (ref 70–99)
HCT VFR BLD CALC: 34.7 % — LOW (ref 42–52)
HGB BLD-MCNC: 11.1 G/DL — LOW (ref 14–18)
IMM GRANULOCYTES NFR BLD AUTO: 1.9 % — HIGH (ref 0.1–0.3)
LYMPHOCYTES # BLD AUTO: 1.07 K/UL — LOW (ref 1.2–3.4)
LYMPHOCYTES # BLD AUTO: 8.3 % — LOW (ref 20.5–51.1)
MAGNESIUM SERPL-MCNC: 2 MG/DL — SIGNIFICANT CHANGE UP (ref 1.8–2.4)
MCHC RBC-ENTMCNC: 28.2 PG — SIGNIFICANT CHANGE UP (ref 27–31)
MCHC RBC-ENTMCNC: 32 G/DL — SIGNIFICANT CHANGE UP (ref 32–37)
MCV RBC AUTO: 88.3 FL — SIGNIFICANT CHANGE UP (ref 80–94)
MONOCYTES # BLD AUTO: 1.07 K/UL — HIGH (ref 0.1–0.6)
MONOCYTES NFR BLD AUTO: 8.3 % — SIGNIFICANT CHANGE UP (ref 1.7–9.3)
NEUTROPHILS # BLD AUTO: 10.39 K/UL — HIGH (ref 1.4–6.5)
NEUTROPHILS NFR BLD AUTO: 80.4 % — HIGH (ref 42.2–75.2)
NRBC # BLD: 0 /100 WBCS — SIGNIFICANT CHANGE UP (ref 0–0)
PLATELET # BLD AUTO: 398 K/UL — SIGNIFICANT CHANGE UP (ref 130–400)
POTASSIUM SERPL-MCNC: 5 MMOL/L — SIGNIFICANT CHANGE UP (ref 3.5–5)
POTASSIUM SERPL-SCNC: 5 MMOL/L — SIGNIFICANT CHANGE UP (ref 3.5–5)
PROT SERPL-MCNC: 6.1 G/DL — SIGNIFICANT CHANGE UP (ref 6–8)
RBC # BLD: 3.93 M/UL — LOW (ref 4.7–6.1)
RBC # FLD: 13.2 % — SIGNIFICANT CHANGE UP (ref 11.5–14.5)
SODIUM SERPL-SCNC: 137 MMOL/L — SIGNIFICANT CHANGE UP (ref 135–146)
WBC # BLD: 12.91 K/UL — HIGH (ref 4.8–10.8)
WBC # FLD AUTO: 12.91 K/UL — HIGH (ref 4.8–10.8)

## 2019-11-21 PROCEDURE — 99239 HOSP IP/OBS DSCHRG MGMT >30: CPT

## 2019-11-21 RX ORDER — CEFTRIAXONE 500 MG/1
1 INJECTION, POWDER, FOR SOLUTION INTRAMUSCULAR; INTRAVENOUS
Qty: 37 | Refills: 0
Start: 2019-11-21 | End: 2019-12-27

## 2019-11-21 RX ORDER — CEFTRIAXONE 500 MG/1
1000 INJECTION, POWDER, FOR SOLUTION INTRAMUSCULAR; INTRAVENOUS ONCE
Refills: 0 | Status: COMPLETED | OUTPATIENT
Start: 2019-11-21 | End: 2019-11-21

## 2019-11-21 RX ORDER — NIFEDIPINE 30 MG
1 TABLET, EXTENDED RELEASE 24 HR ORAL
Qty: 30 | Refills: 1
Start: 2019-11-21 | End: 2020-01-19

## 2019-11-21 RX ORDER — CEFTRIAXONE 500 MG/1
2 INJECTION, POWDER, FOR SOLUTION INTRAMUSCULAR; INTRAVENOUS
Qty: 74 | Refills: 0
Start: 2019-11-21 | End: 2019-12-27

## 2019-11-21 RX ORDER — POLYETHYLENE GLYCOL 3350 17 G/17G
17 POWDER, FOR SOLUTION ORAL ONCE
Refills: 0 | Status: COMPLETED | OUTPATIENT
Start: 2019-11-21 | End: 2019-11-21

## 2019-11-21 RX ORDER — METRONIDAZOLE 500 MG
1 TABLET ORAL
Qty: 74 | Refills: 0
Start: 2019-11-21 | End: 2019-12-27

## 2019-11-21 RX ADMIN — PANTOPRAZOLE SODIUM 40 MILLIGRAM(S): 20 TABLET, DELAYED RELEASE ORAL at 06:14

## 2019-11-21 RX ADMIN — Medication 81 MILLIGRAM(S): at 11:21

## 2019-11-21 RX ADMIN — LISINOPRIL 40 MILLIGRAM(S): 2.5 TABLET ORAL at 05:19

## 2019-11-21 RX ADMIN — Medication 500 MILLIGRAM(S): at 05:19

## 2019-11-21 RX ADMIN — CHLORHEXIDINE GLUCONATE 1 APPLICATION(S): 213 SOLUTION TOPICAL at 05:20

## 2019-11-21 RX ADMIN — Medication 60 MILLIGRAM(S): at 05:19

## 2019-11-21 RX ADMIN — POLYETHYLENE GLYCOL 3350 17 GRAM(S): 17 POWDER, FOR SOLUTION ORAL at 11:21

## 2019-11-21 RX ADMIN — SERTRALINE 50 MILLIGRAM(S): 25 TABLET, FILM COATED ORAL at 11:21

## 2019-11-21 RX ADMIN — MAGNESIUM OXIDE 400 MG ORAL TABLET 400 MILLIGRAM(S): 241.3 TABLET ORAL at 11:21

## 2019-11-21 RX ADMIN — CEFTRIAXONE 100 MILLIGRAM(S): 500 INJECTION, POWDER, FOR SOLUTION INTRAMUSCULAR; INTRAVENOUS at 16:29

## 2019-11-21 RX ADMIN — Medication 500 MILLIGRAM(S): at 16:30

## 2019-11-21 NOTE — DISCHARGE NOTE PROVIDER - CARE PROVIDERS DIRECT ADDRESSES
[email or web address]

## 2019-11-21 NOTE — DISCHARGE NOTE PROVIDER - NSDCMRMEDTOKEN_GEN_ALL_CORE_FT
Aspirin Enteric Coated 81 mg oral delayed release tablet: 1 tab(s) orally once a day   Augmentin 875 mg-125 mg oral tablet: 1 tab(s) orally 2 times a day   benazepril 40 mg oral tablet: 1 tab(s) orally once a day  Crestor 10 mg oral tablet: 1 tab(s) orally once a day (at bedtime)  HumuLIN 70/30 KwikPen 70 units-30 units/mL subcutaneous suspension: 50 unit(s) subcutaneous once a day before breakfast  HumuLIN 70/30 subcutaneous suspension: 40 unit(s) subcutaneous once a day (at bedtime)  Janumet 50 mg-1000 mg oral tablet: 1 tab(s) orally 2 times a day  omeprazole 20 mg oral delayed release capsule: 1 cap(s) orally once a day  povidone iodine 10% topical solution: Apply topically to affected area once a day   sertraline 50 mg oral tablet: 1 tab(s) orally once a day Aspirin Enteric Coated 81 mg oral delayed release tablet: 1 tab(s) orally once a day   benazepril 40 mg oral tablet: 1 tab(s) orally once a day  Crestor 10 mg oral tablet: 1 tab(s) orally once a day (at bedtime)  HumuLIN 70/30 KwikPen 70 units-30 units/mL subcutaneous suspension: 50 unit(s) subcutaneous once a day before breakfast  HumuLIN 70/30 subcutaneous suspension: 40 unit(s) subcutaneous once a day (at bedtime)  Janumet 50 mg-1000 mg oral tablet: 1 tab(s) orally 2 times a day  metroNIDAZOLE 500 mg oral tablet: 1 tab(s) orally every 12 hours until 12/27/2019  NIFEdipine 60 mg oral tablet, extended release: 1 tab(s) orally once a day  omeprazole 20 mg oral delayed release capsule: 1 cap(s) orally once a day  povidone iodine 10% topical solution: Apply topically to affected area once a day   sertraline 50 mg oral tablet: 1 tab(s) orally once a day

## 2019-11-21 NOTE — DISCHARGE NOTE PROVIDER - NSDCFUSCHEDAPPT_GEN_ALL_CORE_FT
JOSÉ MANUEL PORTER ; 11/21/2019 ; [facility] [address] ; 12/05/2019 ; [facility] Surg Vasc [facility] JOSÉ MANUEL PORTER ; 11/21/2019 ; [facility] Pod[facility] [address] ; 12/05/2019 ; [facility] Surg Vasc [facility] JOSÉ MANUEL PORTER ; 11/21/2019 ; [facility] Podiatry [address] ; 12/05/2019 ; [facility] Surg Vasc [facility] JOSÉ MANUEL PORTER ; 11/21/2019 ; [facility] Podiatry [address] ; 12/05/2019 ; [facility] Surg Vasc [facility]

## 2019-11-21 NOTE — DISCHARGE NOTE PROVIDER - HOSPITAL COURSE
68 yo M with PMH of insulin-dependent diabetes mellitus , Diabetic foot ulcer s/p R great toe amputation and Partial Hallux Amputation Left Foot was sent to the ED from podiatry office Dr. Bhatia for worsening right lower ext diabetic foot ulcer. patient reports he had pain and feeling warm for only 1 day. he went to his podiatrist for follow up who upon evaluation referred the patient to the ED. patient also reports he stepped over a screw 3 weeks ago. denies discharge, fever , chills, nausea, vomiting or change in bowel or urinary habits.         68 yo M with PMH of insulin-dependent diabetes mellitus , Diabetic foot ulcer s/p R great toe amputation and Partial Hallux Amputation Left Foot was sent to the ED from podiatry office Dr. Bhatia for worsening right lower ext diabetic foot ulcer after stepping on screw 3 weeks ago. In the ED he was severely hypertensive, febrile to 100.7 and tachycardic to 109. Labs revealed leukocytosis to 12.89 and elevated inflammatory markers with ESR 49 and CRP 15.52. MRI from 11/15 revealed metatarsal osteomyelitis and septic arthritis and Wound Cx grew rare staph aureus with normal skin devendra that was sensitive to Oxacillin. Podiatry recommended non-operative care with IV antibiotics, total contact cast and hyperbaric oxygen today as outpatient. ID recommended Abx via PICC for 6 weeks to end 12/27. Patient is being d/c on oral flagyl and IV ceftriaxone, with weekly CBC, BMP, and ID follow-up with Dr. Gary Turpin on Tuesday 12/10/2019.         The patient also has PVD and follows vascular as Outpt. Duplex was repeated on 11/15 and showed moderate right tibial disease with no evidence of hemodynamically significant disease in LLE. He had an angiogram yesterday which revealed two vessel run off (PT and Peroneal), and occluded Right AT. 70 yo M with PMH of insulin-dependent diabetes mellitus , Diabetic foot ulcer s/p R great toe amputation and Partial Hallux Amputation Left Foot was sent to the ED from podiatry office Dr. Bhatia for worsening right lower ext diabetic foot ulcer after stepping on screw 3 weeks ago. In the ED he was severely hypertensive, febrile to 100.7 and tachycardic to 109. Labs revealed leukocytosis to 12.89 and elevated inflammatory markers with ESR 49 and CRP 15.52. MRI from 11/15 revealed metatarsal osteomyelitis and septic arthritis and Wound Cx grew rare staph aureus with normal skin devendra that was sensitive to Oxacillin. Podiatry recommended non-operative care with IV antibiotics, total contact cast and hyperbaric oxygen today as outpatient. ID recommended Abx via PICC for 6 weeks to end 12/27. Patient is being d/c on oral flagyl and IV ceftriaxone, with weekly CBC, BMP, and ID follow-up with Dr. Gary Turpin on Tuesday 12/10/2019.         The patient also has PVD and follows vascular as Outpt. Duplex was repeated on 11/15 and showed moderate right tibial disease with no evidence of hemodynamically significant disease in LLE. He had an angiogram yesterday which revealed two vessel run off (PT and Peroneal), and occluded Right AT. Needs outpatient f/u with Dr. Espinosa in 2 weeks.        Patient is stable and ready for d/c.

## 2019-11-21 NOTE — DISCHARGE NOTE NURSING/CASE MANAGEMENT/SOCIAL WORK - PATIENT PORTAL LINK FT
You can access the FollowMyHealth Patient Portal offered by [facility] by registering at the following website: [email or web address]. By joining [facility]’s FollowMyHealth portal, you will also be able to view your health information using other applications (apps) compatible with our system.

## 2019-11-21 NOTE — DISCHARGE NOTE PROVIDER - PROVIDER TOKENS
PROVIDER:[TOKEN:[[id number]],FOLLOWUP:[1-3 days]],PROVIDER:[TOKEN:[[id number]],SCHEDULEDAPPT:[12/05/2019]],PROVIDER:[TOKEN:[[id number]],FOLLOWUP:[2 weeks]]

## 2019-11-21 NOTE — DISCHARGE NOTE PROVIDER - NSDCCPCAREPLAN_GEN_ALL_CORE_FT
PRINCIPAL DISCHARGE DIAGNOSIS  Diagnosis: Diabetic foot ulcer  Assessment and Plan of Treatment: You came to the hospital with worsening diabetic foot ulcer on right foot which developed after stepping on a screw 3 weeks ago. Imaging revealed infection of the bones and joint and wound culture grew Staph aureus. Podiatry recommended non-operative care with intravenous antibiotics, total contact cast and hyperbaric oxygen. Please see Dr. Bhatia as outpatient.  Infectious Disease recommended antibiotics via PICC line for 6 weeks to end on 12/27/2019. You are being discharged on oral flagyl and IV ceftriaxone, with weekly blood tests, and Infectious Disease follow-up with Dr. Gary Turpin on Tuesday 12/10/2019.   Please take all medications as prescribed and present to the hospital if you experience worsening of your symptoms.      SECONDARY DISCHARGE DIAGNOSES  Diagnosis: Hypertension  Assessment and Plan of Treatment: Please continue taking your medications as prescribed and monitor your blood pressure at home.    Diagnosis: Diabetes mellitus  Assessment and Plan of Treatment: Please continue taking your medications as prescribed and monitor your blood sugars at home.    Diagnosis: Peripheral vascular disease  Assessment and Plan of Treatment: You also have PVD. Duplex was repeated on showed moderate disease on the right lower extremity. You had an angiogram which showed an occluded Right Anterior Tibial Artery. You need to follow up as outpatient with Dr. Espinosa in 2 weeks. PRINCIPAL DISCHARGE DIAGNOSIS  Diagnosis: Diabetic foot ulcer  Assessment and Plan of Treatment: You came to the hospital with worsening diabetic foot ulcer on right foot which developed after stepping on a screw 3 weeks ago. Imaging revealed infection of the bones and joint and wound culture grew Staph aureus. Podiatry recommended non-operative care with intravenous antibiotics, total contact cast and hyperbaric oxygen. Please see Dr. Bhatia as outpatient.  Infectious Disease recommended antibiotics via PICC line for 6 weeks to end on 12/27/2019. You are being discharged on oral flagyl and IV ceftriaxone, with weekly blood tests, and Infectious Disease follow-up with Dr. Gary Turpin on Tuesday 12/10/2019.   Please take all medications as prescribed and present to the hospital if you experience worsening of your symptoms.      SECONDARY DISCHARGE DIAGNOSES  Diagnosis: Hypertension  Assessment and Plan of Treatment: Please continue taking your medications as prescribed and monitor your blood pressure at home. Follow up with your PCP as outpatient.    Diagnosis: Diabetes mellitus  Assessment and Plan of Treatment: Please continue taking your medications as prescribed and monitor your blood sugars at home. Follow up with your PCP as outpatient.    Diagnosis: Peripheral vascular disease  Assessment and Plan of Treatment: You also have PVD. Duplex was repeated on showed moderate disease on the right lower extremity. You had an angiogram which showed an occluded Right Anterior Tibial Artery. You need to follow up as outpatient with Dr. Espinosa in 2 weeks.

## 2019-11-21 NOTE — DISCHARGE NOTE PROVIDER - CARE PROVIDER_API CALL
Royal Bhatia (DPJAIRO)  Podiatric Medicine  [address] [place] [address]  Phone: [phone]  Fax: [phone]  Follow Up Time: 1-3 days    Jabari Espinosa)  Surgery; Vascular Surgery  [address] [place] [address]  Phone: [phone]  Fax: [phone]  Scheduled Appointment: 12/05/2019    Gary Turpin)  Infectious Disease; Internal Medicine  [address] [place] [address]  Phone: [phone]  Fax: [phone]  Follow Up Time: 2 weeks

## 2019-11-21 NOTE — DISCHARGE NOTE PROVIDER - NSDCCAREPROVSEEN_GEN_ALL_CORE_FT
Rosalind Rowe  ID Team at [facility]  IR Team at [facility]  Vascular Surgery Team at [facility]

## 2019-11-22 ENCOUNTER — INBOUND DOCUMENT (OUTPATIENT)
Age: 69
End: 2019-11-22

## 2019-11-24 LAB
CULTURE RESULTS: SIGNIFICANT CHANGE UP
CULTURE RESULTS: SIGNIFICANT CHANGE UP
SPECIMEN SOURCE: SIGNIFICANT CHANGE UP
SPECIMEN SOURCE: SIGNIFICANT CHANGE UP

## 2019-11-26 ENCOUNTER — APPOINTMENT (OUTPATIENT)
Dept: PODIATRY | Facility: CLINIC | Age: 69
End: 2019-11-26
Payer: MEDICARE

## 2019-11-26 ENCOUNTER — OUTPATIENT (OUTPATIENT)
Dept: OUTPATIENT SERVICES | Facility: HOSPITAL | Age: 69
LOS: 1 days | Discharge: HOME | End: 2019-11-26

## 2019-11-26 VITALS
HEIGHT: 71 IN | WEIGHT: 206 LBS | DIASTOLIC BLOOD PRESSURE: 77 MMHG | HEART RATE: 70 BPM | BODY MASS INDEX: 28.84 KG/M2 | SYSTOLIC BLOOD PRESSURE: 122 MMHG

## 2019-11-26 DIAGNOSIS — Z79.82 LONG TERM (CURRENT) USE OF ASPIRIN: ICD-10-CM

## 2019-11-26 DIAGNOSIS — Z82.49 FAMILY HISTORY OF ISCHEMIC HEART DISEASE AND OTHER DISEASES OF THE CIRCULATORY SYSTEM: ICD-10-CM

## 2019-11-26 DIAGNOSIS — E78.5 HYPERLIPIDEMIA, UNSPECIFIED: ICD-10-CM

## 2019-11-26 DIAGNOSIS — I73.9 PERIPHERAL VASCULAR DISEASE, UNSPECIFIED: ICD-10-CM

## 2019-11-26 DIAGNOSIS — Z79.4 LONG TERM (CURRENT) USE OF INSULIN: ICD-10-CM

## 2019-11-26 DIAGNOSIS — E11.69 TYPE 2 DIABETES MELLITUS WITH OTHER SPECIFIED COMPLICATION: ICD-10-CM

## 2019-11-26 DIAGNOSIS — Z80.1 FAMILY HISTORY OF MALIGNANT NEOPLASM OF TRACHEA, BRONCHUS AND LUNG: ICD-10-CM

## 2019-11-26 DIAGNOSIS — I16.0 HYPERTENSIVE URGENCY: ICD-10-CM

## 2019-11-26 DIAGNOSIS — E11.621 TYPE 2 DIABETES MELLITUS WITH FOOT ULCER: ICD-10-CM

## 2019-11-26 DIAGNOSIS — K21.9 GASTRO-ESOPHAGEAL REFLUX DISEASE WITHOUT ESOPHAGITIS: ICD-10-CM

## 2019-11-26 DIAGNOSIS — E83.42 HYPOMAGNESEMIA: ICD-10-CM

## 2019-11-26 DIAGNOSIS — M86.9 OSTEOMYELITIS, UNSPECIFIED: ICD-10-CM

## 2019-11-26 DIAGNOSIS — M00.9 PYOGENIC ARTHRITIS, UNSPECIFIED: ICD-10-CM

## 2019-11-26 DIAGNOSIS — D63.8 ANEMIA IN OTHER CHRONIC DISEASES CLASSIFIED ELSEWHERE: ICD-10-CM

## 2019-11-26 DIAGNOSIS — F32.9 MAJOR DEPRESSIVE DISORDER, SINGLE EPISODE, UNSPECIFIED: ICD-10-CM

## 2019-11-26 DIAGNOSIS — L97.919 NON-PRESSURE CHRONIC ULCER OF UNSPECIFIED PART OF RIGHT LOWER LEG WITH UNSPECIFIED SEVERITY: ICD-10-CM

## 2019-11-26 DIAGNOSIS — Z89.421 ACQUIRED ABSENCE OF OTHER RIGHT TOE(S): Chronic | ICD-10-CM

## 2019-11-26 DIAGNOSIS — K59.00 CONSTIPATION, UNSPECIFIED: ICD-10-CM

## 2019-11-26 DIAGNOSIS — E66.9 OBESITY, UNSPECIFIED: ICD-10-CM

## 2019-11-26 DIAGNOSIS — I10 ESSENTIAL (PRIMARY) HYPERTENSION: ICD-10-CM

## 2019-11-26 DIAGNOSIS — Z89.411 ACQUIRED ABSENCE OF RIGHT GREAT TOE: ICD-10-CM

## 2019-11-26 DIAGNOSIS — A41.9 SEPSIS, UNSPECIFIED ORGANISM: ICD-10-CM

## 2019-11-26 DIAGNOSIS — E11.51 TYPE 2 DIABETES MELLITUS WITH DIABETIC PERIPHERAL ANGIOPATHY WITHOUT GANGRENE: ICD-10-CM

## 2019-11-26 PROCEDURE — 99214 OFFICE O/P EST MOD 30 MIN: CPT

## 2019-11-27 NOTE — ASSESSMENT
[FreeTextEntry1] : Pt. seen and evaluated\par Discussed treatment and condition w/ patient\par Ulcer  excisionally debrided of fibrotic/devitalized tissue down to subcutaneous layer w/ a #15 blade w/o incident. Performed under sterile conditions with curette and scalpel. right foot \par Pt. to RTC in 1 week\par \par

## 2019-11-27 NOTE — PHYSICAL EXAM
[General Appearance - Alert] : alert [General Appearance - In No Acute Distress] : in no acute distress [Oriented To Time, Place, And Person] : oriented to person, place, and time [Impaired Insight] : insight and judgment were intact [Affect] : the affect was normal [FreeTextEntry1] : Protective sensation  diminished

## 2019-11-27 NOTE — END OF VISIT
[] : Resident [Time Spent: ___ minutes] : I have spent [unfilled] minutes of face to face time with the patient [>50% of Time Spent on Coordination of Care for  ___] : Greater than 50% of the encounter time was spent on coordination of care for [unfilled] [FreeTextEntry3] : Pt recently admitted to [facility] for worsening ulcer and cellulitis. MRI revealed osteomyelitis in parts of the 2nd and 3rd metatarsal heads and 3rd proximal phalanx. Patient  has a history of right 1st ray amputation and additional limited bone resections of the 2nd and 3rd metatarsal will lead to overloading of the lateral column, further breakdown and additional surgery; rather a transmetataral amputation would be the optimal choice. Patient wanted to a trial of conservative care with PICC line, TCC and HBOT. During patients hospital stay, an angiogram was performed which revealed patent PT and peroneal arteries. Today was patients first visit after discharge. Presents with new onset of ischemic changes to the 3rd toe with blistering of skin. \par -patient already on antibiotics\par -home VNS for wound care ordered: betadine + gauze dressing \par -I spoke with Dr. Espinosa during patients visit regarding new onset of ischemic changes-->no additional vascular surgical intervention needed.\par -Await for line of demarcation-->will likely need additional surgery\par -follow up one week

## 2019-11-27 NOTE — HISTORY OF PRESENT ILLNESS
[FreeTextEntry1] : Patient returns to the clinic for a follow-up visit of his ulcer on the plantar aspect of his right foot. Pt. stated that he noticed the discoloration of his second digit. Pt. states that he has been receiving his IV abx.  denies any recent n/f/v/c/sob. Pt. denies any other pedal complaints at this time.

## 2019-12-03 ENCOUNTER — APPOINTMENT (OUTPATIENT)
Dept: PODIATRY | Facility: CLINIC | Age: 69
End: 2019-12-03

## 2019-12-03 ENCOUNTER — INPATIENT (INPATIENT)
Facility: HOSPITAL | Age: 69
LOS: 15 days | Discharge: NURSING, REHAB OR LONG-TERM CARE | End: 2019-12-19
Attending: INTERNAL MEDICINE | Admitting: INTERNAL MEDICINE
Payer: MEDICARE

## 2019-12-03 VITALS
HEIGHT: 71 IN | OXYGEN SATURATION: 100 % | WEIGHT: 216.05 LBS | RESPIRATION RATE: 17 BRPM | HEART RATE: 91 BPM | SYSTOLIC BLOOD PRESSURE: 150 MMHG | TEMPERATURE: 98 F | DIASTOLIC BLOOD PRESSURE: 60 MMHG

## 2019-12-03 DIAGNOSIS — Z89.421 ACQUIRED ABSENCE OF OTHER RIGHT TOE(S): Chronic | ICD-10-CM

## 2019-12-03 LAB
ALBUMIN SERPL ELPH-MCNC: 3.3 G/DL — LOW (ref 3.5–5.2)
ALP SERPL-CCNC: 206 U/L — HIGH (ref 30–115)
ALT FLD-CCNC: 9 U/L — SIGNIFICANT CHANGE UP (ref 0–41)
ANION GAP SERPL CALC-SCNC: 15 MMOL/L — HIGH (ref 7–14)
APTT BLD: 37.4 SEC — SIGNIFICANT CHANGE UP (ref 27–39.2)
AST SERPL-CCNC: 13 U/L — SIGNIFICANT CHANGE UP (ref 0–41)
BASOPHILS # BLD AUTO: 0.03 K/UL — SIGNIFICANT CHANGE UP (ref 0–0.2)
BASOPHILS NFR BLD AUTO: 0.2 % — SIGNIFICANT CHANGE UP (ref 0–1)
BILIRUB SERPL-MCNC: 0.4 MG/DL — SIGNIFICANT CHANGE UP (ref 0.2–1.2)
BLD GP AB SCN SERPL QL: SIGNIFICANT CHANGE UP
BUN SERPL-MCNC: 19 MG/DL — SIGNIFICANT CHANGE UP (ref 10–20)
CALCIUM SERPL-MCNC: 8.5 MG/DL — SIGNIFICANT CHANGE UP (ref 8.5–10.1)
CHLORIDE SERPL-SCNC: 100 MMOL/L — SIGNIFICANT CHANGE UP (ref 98–110)
CO2 SERPL-SCNC: 21 MMOL/L — SIGNIFICANT CHANGE UP (ref 17–32)
CREAT SERPL-MCNC: 1.1 MG/DL — SIGNIFICANT CHANGE UP (ref 0.7–1.5)
EOSINOPHIL # BLD AUTO: 0.01 K/UL — SIGNIFICANT CHANGE UP (ref 0–0.7)
EOSINOPHIL NFR BLD AUTO: 0.1 % — SIGNIFICANT CHANGE UP (ref 0–8)
ERYTHROCYTE [SEDIMENTATION RATE] IN BLOOD: 70 MM/HR — HIGH (ref 0–10)
GLUCOSE BLDC GLUCOMTR-MCNC: 204 MG/DL — HIGH (ref 70–99)
GLUCOSE BLDC GLUCOMTR-MCNC: 221 MG/DL — HIGH (ref 70–99)
GLUCOSE BLDC GLUCOMTR-MCNC: 235 MG/DL — HIGH (ref 70–99)
GLUCOSE SERPL-MCNC: 217 MG/DL — HIGH (ref 70–99)
HCT VFR BLD CALC: 30.4 % — LOW (ref 42–52)
HGB BLD-MCNC: 9.5 G/DL — LOW (ref 14–18)
IMM GRANULOCYTES NFR BLD AUTO: 0.5 % — HIGH (ref 0.1–0.3)
INR BLD: 1.74 RATIO — HIGH (ref 0.65–1.3)
LYMPHOCYTES # BLD AUTO: 0.63 K/UL — LOW (ref 1.2–3.4)
LYMPHOCYTES # BLD AUTO: 4.8 % — LOW (ref 20.5–51.1)
MCHC RBC-ENTMCNC: 28.3 PG — SIGNIFICANT CHANGE UP (ref 27–31)
MCHC RBC-ENTMCNC: 31.3 G/DL — LOW (ref 32–37)
MCV RBC AUTO: 90.5 FL — SIGNIFICANT CHANGE UP (ref 80–94)
MONOCYTES # BLD AUTO: 1.07 K/UL — HIGH (ref 0.1–0.6)
MONOCYTES NFR BLD AUTO: 8.1 % — SIGNIFICANT CHANGE UP (ref 1.7–9.3)
NEUTROPHILS # BLD AUTO: 11.43 K/UL — HIGH (ref 1.4–6.5)
NEUTROPHILS NFR BLD AUTO: 86.3 % — HIGH (ref 42.2–75.2)
NRBC # BLD: 0 /100 WBCS — SIGNIFICANT CHANGE UP (ref 0–0)
PLATELET # BLD AUTO: 301 K/UL — SIGNIFICANT CHANGE UP (ref 130–400)
POTASSIUM SERPL-MCNC: 4.3 MMOL/L — SIGNIFICANT CHANGE UP (ref 3.5–5)
POTASSIUM SERPL-SCNC: 4.3 MMOL/L — SIGNIFICANT CHANGE UP (ref 3.5–5)
PROT SERPL-MCNC: 6.5 G/DL — SIGNIFICANT CHANGE UP (ref 6–8)
PROTHROM AB SERPL-ACNC: 19.9 SEC — HIGH (ref 9.95–12.87)
RBC # BLD: 3.36 M/UL — LOW (ref 4.7–6.1)
RBC # FLD: 13.7 % — SIGNIFICANT CHANGE UP (ref 11.5–14.5)
SODIUM SERPL-SCNC: 136 MMOL/L — SIGNIFICANT CHANGE UP (ref 135–146)
WBC # BLD: 13.24 K/UL — HIGH (ref 4.8–10.8)
WBC # FLD AUTO: 13.24 K/UL — HIGH (ref 4.8–10.8)

## 2019-12-03 PROCEDURE — 73630 X-RAY EXAM OF FOOT: CPT | Mod: 26,RT

## 2019-12-03 PROCEDURE — 99285 EMERGENCY DEPT VISIT HI MDM: CPT | Mod: GC

## 2019-12-03 PROCEDURE — 71045 X-RAY EXAM CHEST 1 VIEW: CPT | Mod: 26

## 2019-12-03 PROCEDURE — 99222 1ST HOSP IP/OBS MODERATE 55: CPT | Mod: 24

## 2019-12-03 PROCEDURE — 93010 ELECTROCARDIOGRAM REPORT: CPT

## 2019-12-03 RX ORDER — NIFEDIPINE 30 MG
60 TABLET, EXTENDED RELEASE 24 HR ORAL DAILY
Refills: 0 | Status: DISCONTINUED | OUTPATIENT
Start: 2019-12-03 | End: 2019-12-04

## 2019-12-03 RX ORDER — SERTRALINE 25 MG/1
1 TABLET, FILM COATED ORAL
Qty: 0 | Refills: 0 | DISCHARGE

## 2019-12-03 RX ORDER — DEXTROSE 50 % IN WATER 50 %
25 SYRINGE (ML) INTRAVENOUS ONCE
Refills: 0 | Status: DISCONTINUED | OUTPATIENT
Start: 2019-12-03 | End: 2019-12-04

## 2019-12-03 RX ORDER — ASPIRIN/CALCIUM CARB/MAGNESIUM 324 MG
81 TABLET ORAL DAILY
Refills: 0 | Status: DISCONTINUED | OUTPATIENT
Start: 2019-12-03 | End: 2019-12-04

## 2019-12-03 RX ORDER — METRONIDAZOLE 500 MG
500 TABLET ORAL EVERY 8 HOURS
Refills: 0 | Status: DISCONTINUED | OUTPATIENT
Start: 2019-12-03 | End: 2019-12-04

## 2019-12-03 RX ORDER — INSULIN LISPRO 100/ML
8 VIAL (ML) SUBCUTANEOUS
Refills: 0 | Status: DISCONTINUED | OUTPATIENT
Start: 2019-12-03 | End: 2019-12-03

## 2019-12-03 RX ORDER — LISINOPRIL 2.5 MG/1
40 TABLET ORAL DAILY
Refills: 0 | Status: DISCONTINUED | OUTPATIENT
Start: 2019-12-03 | End: 2019-12-04

## 2019-12-03 RX ORDER — CHLORHEXIDINE GLUCONATE 213 G/1000ML
1 SOLUTION TOPICAL
Refills: 0 | Status: DISCONTINUED | OUTPATIENT
Start: 2019-12-03 | End: 2019-12-04

## 2019-12-03 RX ORDER — INSULIN GLARGINE 100 [IU]/ML
25 INJECTION, SOLUTION SUBCUTANEOUS AT BEDTIME
Refills: 0 | Status: COMPLETED | OUTPATIENT
Start: 2019-12-03 | End: 2019-12-03

## 2019-12-03 RX ORDER — INSULIN LISPRO 100/ML
13 VIAL (ML) SUBCUTANEOUS
Refills: 0 | Status: DISCONTINUED | OUTPATIENT
Start: 2019-12-03 | End: 2019-12-04

## 2019-12-03 RX ORDER — PANTOPRAZOLE SODIUM 20 MG/1
40 TABLET, DELAYED RELEASE ORAL
Refills: 0 | Status: DISCONTINUED | OUTPATIENT
Start: 2019-12-03 | End: 2019-12-04

## 2019-12-03 RX ORDER — DEXTROSE 50 % IN WATER 50 %
12.5 SYRINGE (ML) INTRAVENOUS ONCE
Refills: 0 | Status: DISCONTINUED | OUTPATIENT
Start: 2019-12-03 | End: 2019-12-04

## 2019-12-03 RX ORDER — INSULIN LISPRO 100/ML
VIAL (ML) SUBCUTANEOUS
Refills: 0 | Status: DISCONTINUED | OUTPATIENT
Start: 2019-12-03 | End: 2019-12-04

## 2019-12-03 RX ORDER — INSULIN GLARGINE 100 [IU]/ML
40 INJECTION, SOLUTION SUBCUTANEOUS AT BEDTIME
Refills: 0 | Status: DISCONTINUED | OUTPATIENT
Start: 2019-12-04 | End: 2019-12-04

## 2019-12-03 RX ORDER — INFLUENZA VIRUS VACCINE 15; 15; 15; 15 UG/.5ML; UG/.5ML; UG/.5ML; UG/.5ML
0.5 SUSPENSION INTRAMUSCULAR ONCE
Refills: 0 | Status: COMPLETED | OUTPATIENT
Start: 2019-12-03 | End: 2019-12-03

## 2019-12-03 RX ORDER — CEFTRIAXONE 500 MG/1
2000 INJECTION, POWDER, FOR SOLUTION INTRAMUSCULAR; INTRAVENOUS ONCE
Refills: 0 | Status: COMPLETED | OUTPATIENT
Start: 2019-12-03 | End: 2019-12-03

## 2019-12-03 RX ORDER — DEXTROSE 50 % IN WATER 50 %
15 SYRINGE (ML) INTRAVENOUS ONCE
Refills: 0 | Status: DISCONTINUED | OUTPATIENT
Start: 2019-12-03 | End: 2019-12-04

## 2019-12-03 RX ORDER — METRONIDAZOLE 500 MG
500 TABLET ORAL ONCE
Refills: 0 | Status: COMPLETED | OUTPATIENT
Start: 2019-12-03 | End: 2019-12-03

## 2019-12-03 RX ORDER — SODIUM CHLORIDE 9 MG/ML
1000 INJECTION, SOLUTION INTRAVENOUS
Refills: 0 | Status: DISCONTINUED | OUTPATIENT
Start: 2019-12-03 | End: 2019-12-04

## 2019-12-03 RX ORDER — METRONIDAZOLE 500 MG
500 TABLET ORAL EVERY 12 HOURS
Refills: 0 | Status: DISCONTINUED | OUTPATIENT
Start: 2019-12-03 | End: 2019-12-03

## 2019-12-03 RX ORDER — CEFTRIAXONE 500 MG/1
2000 INJECTION, POWDER, FOR SOLUTION INTRAMUSCULAR; INTRAVENOUS EVERY 24 HOURS
Refills: 0 | Status: DISCONTINUED | OUTPATIENT
Start: 2019-12-04 | End: 2019-12-04

## 2019-12-03 RX ORDER — INSULIN GLARGINE 100 [IU]/ML
8 INJECTION, SOLUTION SUBCUTANEOUS AT BEDTIME
Refills: 0 | Status: DISCONTINUED | OUTPATIENT
Start: 2019-12-03 | End: 2019-12-03

## 2019-12-03 RX ORDER — ACETAMINOPHEN 500 MG
650 TABLET ORAL EVERY 6 HOURS
Refills: 0 | Status: DISCONTINUED | OUTPATIENT
Start: 2019-12-03 | End: 2019-12-04

## 2019-12-03 RX ORDER — ATORVASTATIN CALCIUM 80 MG/1
40 TABLET, FILM COATED ORAL AT BEDTIME
Refills: 0 | Status: DISCONTINUED | OUTPATIENT
Start: 2019-12-03 | End: 2019-12-04

## 2019-12-03 RX ORDER — GLUCAGON INJECTION, SOLUTION 0.5 MG/.1ML
1 INJECTION, SOLUTION SUBCUTANEOUS ONCE
Refills: 0 | Status: DISCONTINUED | OUTPATIENT
Start: 2019-12-03 | End: 2019-12-04

## 2019-12-03 RX ADMIN — INSULIN GLARGINE 25 UNIT(S): 100 INJECTION, SOLUTION SUBCUTANEOUS at 22:22

## 2019-12-03 RX ADMIN — LISINOPRIL 40 MILLIGRAM(S): 2.5 TABLET ORAL at 18:35

## 2019-12-03 RX ADMIN — Medication 100 MILLIGRAM(S): at 12:38

## 2019-12-03 RX ADMIN — Medication 60 MILLIGRAM(S): at 18:35

## 2019-12-03 RX ADMIN — CEFTRIAXONE 100 MILLIGRAM(S): 500 INJECTION, POWDER, FOR SOLUTION INTRAMUSCULAR; INTRAVENOUS at 12:40

## 2019-12-03 RX ADMIN — Medication 100 MILLIGRAM(S): at 22:23

## 2019-12-03 RX ADMIN — Medication 2: at 18:33

## 2019-12-03 RX ADMIN — ATORVASTATIN CALCIUM 40 MILLIGRAM(S): 80 TABLET, FILM COATED ORAL at 22:22

## 2019-12-03 RX ADMIN — Medication 13 UNIT(S): at 18:32

## 2019-12-03 RX ADMIN — Medication 81 MILLIGRAM(S): at 18:35

## 2019-12-03 NOTE — ED ADULT TRIAGE NOTE - CHIEF COMPLAINT QUOTE
right DFU, d/c from hospital last week on IV abx (GLENROY PICC) As per wife "his toe (right 3rd) turned black" Pt c/o weakness and pain to right foot.

## 2019-12-03 NOTE — H&P ADULT - NSHPLABSRESULTS_GEN_ALL_CORE
9.5    13.24 )-----------( 301      ( 03 Dec 2019 11:00 )             30.4     12-03    136  |  100  |  19  ----------------------------<  217<H>  4.3   |  21  |  1.1    Ca    8.5      03 Dec 2019 11:00    TPro  6.5  /  Alb  3.3<L>  /  TBili  0.4  /  DBili  x   /  AST  13  /  ALT  9   /  AlkPhos  206<H>  12-03    PT/INR - ( 03 Dec 2019 11:00 )   PT: 19.90 sec;   INR: 1.74 ratio    PTT - ( 03 Dec 2019 11:00 )  PTT:37.4 sec    ESR: 70    XR R foot AP/Lateral/Oblique: pending  CXR: pending

## 2019-12-03 NOTE — H&P ADULT - ATTENDING COMMENTS
Pt seen and examined independently at bedside. Pt currently denies SOB. Pt apprehensive about TMA but is considering it over right third toe amputation. ID consult pending. C/w IV antibiotics, follow cultures. Pt has hypomagnesemia. Needs repletion. Pt has slightly elevated INR, could be related to nutritional deficiencies. Will continue to monitor.

## 2019-12-03 NOTE — ED ADULT NURSE NOTE - OBJECTIVE STATEMENT
Pt c/o pain to R foot. Pt being treated for DFU with infusion therapy at home. AYDEN PICC line dressing dated 11/27/2019.

## 2019-12-03 NOTE — CONSULT NOTE ADULT - SUBJECTIVE AND OBJECTIVE BOX
PODIATRY CONSULT   JOSÉ MANUEL PORTER is a 69y Male Patient is a 69y old  Male who presents with a chief complaint of dark ischemic R 3rd toe  HPI:  70yo M with PMH of depression, DFU s/p amputation of 1/2 of left great toe, full amputation of R great toe, HLD, GERD, and HTN present to ED with ischemic R 3rd toe and DFU R foot. Pt's wife noticed R 3rd toe getting darker. DFU R foot with daily dressing changes. PICC jaspal placed on last admission. No pain. Denies n/v/c/d/sob.     GERD (gastroesophageal reflux disease)  Depression  Diabetic foot ulcer  Dyslipidemia  HTN (hypertension)  DM (diabetes mellitus)      PMH: GERD (gastroesophageal reflux disease)  Depression  Diabetic foot ulcer  Dyslipidemia  HTN (hypertension)  DM (diabetes mellitus)    PSH: Toe amputation status, right    Medication   Allergy: No Known Allergies        Labs:                        9.5    13.24 )-----------( 301      ( 03 Dec 2019 11:00 )             30.4     PT/INR - ( 03 Dec 2019 11:00 )   PT: 19.90 sec;   INR: 1.74 ratio         PTT - ( 03 Dec 2019 11:00 )  PTT:37.4 sec  12-03    136  |  100  |  19  ----------------------------<  217<H>  4.3   |  21  |  1.1    Ca    8.5      03 Dec 2019 11:00    TPro  6.5  /  Alb  3.3<L>  /  TBili  0.4  /  DBili  x   /  AST  13  /  ALT  9   /  AlkPhos  206<H>  12-03            ROS:  [ ]A ten point review of system was otherwise negative except as noted    Physical Exam - Lower Extremity Focused:   Derm:   Ulceration Right SUB met 2  - Wound Edges  + hyperkeratotic border, +Undermining  - Wound base Granular/Fibrotic, wound size (2.2 cm X 0.6 cm X 1.6cm), - fluctuance, + probe to deep tissue and capsule but not to bone , +Tunneling,   - Rose-wound skin - Erythema, - Streaking erythema, + Warmth  + Edema,  - No Drainage   Ischemic changes 2nd toe with overlying blister  Necrotic 3rd toe - No drainage  Vascular:   DP and PT pulses 0/4 R  Neuro:  - Protective sensation diminished.   MSK:   Manual Muscle strength 5/5 in all muscle compartments R foot. Amp R hallux      Assessment:   Dry Gangrene R 3rd toe  Ischemic changes R 2nd toe  DFU R Sub Met 2  PAD     Plan:  Chart reviewed and Patient evaluated  Discussed diagnosis and treatment with patient  Wound Care Orders: Flush with saline and apply Betadine Soaked gauze to the toes and wound.   X-rays ordered  Recommend ID consult  Continue Abx as per ID  Recommend Vascular Consult - Pt of Dr. Espinosa (Vascular team aware)  Podiatry will follow while in house.  Discussed care plan  with  Attending  Pt to OR for debridement/amputation of the 3rd toe - Wednesday 12/4   Medical clearance with stratification requested  NPO @[place]  Please optimize all labs prior to surgery, K<5 and H>8 PODIATRY CONSULT   JOSÉ MANUEL PORTER is a 69y Male Patient is a 69y old  Male who presents with a chief complaint of dark ischemic R 3rd toe  HPI:  68yo M with PMH of depression, DFU s/p amputation of 1st and 2nd of left great toe, full amputation of R great toe, HLD, GERD, and HTN present to ED with ischemic R 3rd toe and DFU R foot. Pt's wife noticed R 3rd toe getting darker. DFU R foot with daily dressing changes. PICC jaspal placed on last admission. No pain. Denies n/v/c/d/sob.     GERD (gastroesophageal reflux disease)  Depression  Diabetic foot ulcer  Dyslipidemia  HTN (hypertension)  DM (diabetes mellitus)      PMH: GERD (gastroesophageal reflux disease)  Depression  Diabetic foot ulcer  Dyslipidemia  HTN (hypertension)  DM (diabetes mellitus)    PSH: Toe amputation status, right    Medication   Allergy: No Known Allergies        Labs:                        9.5    13.24 )-----------( 301      ( 03 Dec 2019 11:00 )             30.4     PT/INR - ( 03 Dec 2019 11:00 )   PT: 19.90 sec;   INR: 1.74 ratio         PTT - ( 03 Dec 2019 11:00 )  PTT:37.4 sec  12-03    136  |  100  |  19  ----------------------------<  217<H>  4.3   |  21  |  1.1    Ca    8.5      03 Dec 2019 11:00    TPro  6.5  /  Alb  3.3<L>  /  TBili  0.4  /  DBili  x   /  AST  13  /  ALT  9   /  AlkPhos  206<H>  12-03            ROS:  [ ]A ten point review of system was otherwise negative except as noted    Physical Exam - Lower Extremity Focused:   Derm:   Ulceration Right SUB met 2  - Wound Edges  + hyperkeratotic border, +Undermining  - Wound base Granular/Fibrotic, wound size (2.2 cm X 0.6 cm X 1.6cm), - fluctuance, + probe to deep tissue and capsule but not to bone , +Tunneling,   - Rose-wound skin - Erythema, - Streaking erythema, + Warmth  + Edema,  - No Drainage   Ischemic changes 2nd toe with overlying blister  Necrotic 3rd toe - No drainage  Vascular:   DP and PT pulses 0/4 R  Neuro:  - Protective sensation diminished.   MSK:   Manual Muscle strength 5/5 in all muscle compartments R foot. Amp R hallux      Assessment:   Dry Gangrene R 3rd toe  Ischemic changes R 2nd toe  DFU R Sub Met 2  PAD   osteomyelitis     Plan:  Chart reviewed and Patient evaluated  Discussed diagnosis and treatment with patient  Wound Care Orders: Flush with saline and apply Betadine Soaked gauze to the toes and wound.   X-rays ordered  Recommend ID consult  Continue Abx as per ID  Recommend Vascular Consult - Pt of Dr. Espinosa (Vascular team aware)  Podiatry will follow while in house.  Discussed care plan  with  Attending  Pt to OR for debridement/amputation of the 3rd toe - Wednesday 12/4   Medical clearance with stratification requested  NPO @[place]  Please optimize all labs prior to surgery, K<5 and H>8

## 2019-12-03 NOTE — H&P ADULT - NSHPPHYSICALEXAM_GEN_ALL_CORE
Constitutional:  Eyes:  ENMT:  Neck:  Back:  Respiratory:  Cardiovascular:  Gastrointestinal:  Extremities:  Vascular:  Neurological:  Skin:  Musculoskeletal: Constitutional: NAD, well-built, well-appearing  Eyes: EOMI, PERRLA, no scleral icterus  ENMT: trachea midline, tongue midline and pink  Respiratory: CTA b/l, no rales, rhonchi, or wheezes  Cardiovascular: RRR, + 2/6 systolic murmur, no rubs or gallops  Gastrointestinal: soft, non-tender, +BS, no rebound or guarding  Extremities: RLE in ace bandage at foot (did not personally view foot under bandage)  Vascular: 0/4 R DP/PT pulses per podiatriac exam  Neurological: no focal deficits  Skin: no rashes identified Constitutional: NAD, well-built, well-appearing  Eyes: EOMI, PERRLA, no scleral icterus  ENMT: trachea midline, tongue midline and pink  Respiratory: CTA b/l, no rales, rhonchi, or wheezes  Cardiovascular: RRR, + 2/6 systolic murmur, no rubs or gallops  Gastrointestinal: soft, non-tender, +BS, no rebound or guarding  Extremities: RLE in ace bandage at foot (did not personally view foot under bandage)  Vascular: 0/4 R DP/PT pulses per podiatriac exam  Neurological: no focal deficits  Skin: no rashes identified  Psych: AAOx3

## 2019-12-03 NOTE — ED PROVIDER NOTE - NS ED ROS FT
Constitutional:  No fevers or chills.  Eyes:  No visual changes.  ENT:  No sore throat.  Neck:  No neck pain.  Cardiac:  No CP or edema.  Resp:  No cough or SOB.  GI:  No nausea, vomiting, diarrhea, or abdominal pain.  :  No dysuria, frequency, or hematuria.  MSK:  +R foot pain.  Neuro:  No headache. +Generalized weakness.  Skin:  No skin rash. Constitutional:  No fevers or chills.  Eyes:  No visual changes.  ENT:  No sore throat.  Neck:  No neck pain.  Cardiac:  No CP or edema.  Resp:  No cough or SOB.  GI:  No nausea, vomiting, diarrhea, or abdominal pain.  :  No dysuria, frequency, or hematuria.  MSK:  +R foot pain/R toe pain.  Neuro:  No headache. +Generalized weakness.  Skin:  No skin rash.

## 2019-12-03 NOTE — ED CLERICAL - NS ED CLERK NOTE PRE-ARRIVAL INFORMATION; ADDITIONAL PRE-ARRIVAL INFORMATION
This patient is enrolled in the [facility] Transitional Care Management/ Next Gen ACO readmission reduction program.   This patient will be followed up by the care navigation team within 24 hours.  To arrange close follow-up or to obtain additional clinical information about this patient, please call the contact number above.

## 2019-12-03 NOTE — ED ADULT NURSE NOTE - PAIN: PRESENCE, MLM
Patient called to let us know that she woke up at 4:30 this morning and was out of rhythm. She took two Flecainide. She went back into NSR but her HR was still 112. So she took an Ativan to calm herself down. Her HR then went down into the 70's and 80's and has stayed there since. She also thinks it could be due to having a viral upper respiratory infection and a cold. She will let us know if it happens again so that she can have an EKG.   complains of pain/discomfort

## 2019-12-03 NOTE — ED PROVIDER NOTE - PHYSICAL EXAMINATION
PHYSICAL EXAM: I have reviewed current vital signs.  GENERAL: NAD, well-nourished; well-developed.  HEAD:  Normocephalic, atraumatic.  EYES: Conjunctiva and sclera clear.  ENT: MMM, no erythema/exudates.  NECK: Supple, full ROM.  CHEST/LUNG: Clear to auscultation bilaterally; no wheezes, rales, or rhonchi.  HEART: Regular rate and rhythm, normal S1 and S2; no murmurs, rubs, or gallops.  ABDOMEN: Soft, nontender, nondistended.  EXTREMITIES:  2+ peripheral pulses; FROM. Necrotic/gangrenous R third toe.  NEUROLOGY: A&O x 3. Motor 5/5. No focal neurological deficits.   SKIN: Warm and dry.

## 2019-12-03 NOTE — ED PROVIDER NOTE - OBJECTIVE STATEMENT
70yo M with PMH of depression, DFU s/p amputation of 1/2 of left great toe, full amputation of R great toe, HLD, GERD, and HTN 68yo M with PMH of depression, DFU s/p amputation of 1/2 of left great toe, full amputation of R great toe, HLD, GERD, and HTN, and distal R third toe wound x 5 weeks presenting to ED with increased R third toe pain/necrosis/drainage. Endorses generalized weakness. Denies any f/c, CP, SOB, abd pain, n/v/d, or injuries/traumas/falls. Patient was recently discharged from hospital 11/21/19 with PICC line, was receiving Ceftriaxone 2g and Flagyl 500mg IV daily since for toe infection. Despite abx, infection of toe is worsening. Podiatrist is Dr. Bhatia.

## 2019-12-03 NOTE — ED ADULT NURSE NOTE - NSIMPLEMENTINTERV_GEN_ALL_ED
Implemented All Fall Risk Interventions:  [place] to call system. Call bell, personal items and telephone within reach. Instruct patient to call for assistance. Room bathroom lighting operational. Non-slip footwear when patient is off stretcher. Physically safe environment: no spills, clutter or unnecessary equipment. Stretcher in lowest position, wheels locked, appropriate side rails in place. Provide visual cue, wrist band, yellow gown, etc. Monitor gait and stability. Monitor for mental status changes and reorient to person, place, and time. Review medications for side effects contributing to fall risk. Reinforce activity limits and safety measures with patient and family.

## 2019-12-03 NOTE — ED PROVIDER NOTE - PROGRESS NOTE DETAILS
Attending Note: I personally evaluated the patient. I reviewed the Resident’s note, and agree with the findings and plan except as documented in my note.  70 yo M PMH of DM, HTN p/w R 3rd toe discoloration and light gangrene. Pt was on ABX in the past, HX of picc line in place. Physical exam: Pt non-toxic, well appearing. Pink conjunctiva, anicteric. PERRL, EOMI. MMM, no exudates. Neck supple, no crepitus. RRR, no murmur. Lungs CTAB. Abdomen soft, NT/ND, no rash. No calf tenderness or edema. No focal neuro deficits. Plan: Labs, podiatry eval, reassess. Informed patient and wife of lab/radiology results. Agree with plan for admission. Given abx recommended by ID per last admission. Podiatry evaluated. Attending Note: I personally evaluated the patient. I reviewed the Resident’s note, and agree with the findings and plan except as documented in my note.  68 yo M PMH of DM, HTN p/w R 3rd toe discoloration and light gangrene. Pt was on ABX in the past, HX of picc line in place. Physical exam: Pt non-toxic, well appearing. Pink conjunctiva, anicteric. PERRL, EOMI. MMM, no exudates. Neck supple, no crepitus. RRR, no murmur. Lungs CTAB. Abdomen soft, NT/ND No calf tenderness or edema. No focal neuro deficits. Plan: Labs, podiatry eval, reassess.

## 2019-12-03 NOTE — H&P ADULT - ASSESSMENT
69 year-old male with PMH of insulin-dependent DM c/b diabetic foot ulcers s/p amputations of half of left great toe and entire right great toe, PVD, HTN, HLD, GERD, and depression who p/w worsening of a previously diagnosed R third toe wound found to have worsening gangrene requiring amputation of R third toe.    # Right third toe DFU w/worsening gangrene (recent OM in same foot) with PVD in RLE  - Podiatry following. Plan for OR Wednesday, 12/4 for R third toe amputation. Will make NPO after midnight and send pre-op labs  - Vascular surgery (Dr. Espinosa) consulted. Patient recently discharged with follow-up scheduled with Dr. Espinosa after angiogram revealed moderate R tibial disease  - ID (Dr. Gary Turpin) consulted. Had PICC line placed prior to discharge on last admission (discharged 11/21) receiving IV Rocephin 2 g qD and PO Flagyl 500 mg qD. Will continue for now    # Insulin-dependent T2DM    # Hypertension    # Hyperlipidemia    # GERD    # Depression    #DVT ppx:  #GI ppx:   #Diet: DASH/TLC consistent carb. NPO after midnight  #Activity: OOBTC  #Code status: FULL CODE  #Dispo: Med-surg. From home  #Follow-up: ________Podiatry OR 12/4, Vascular consult, ID consult____________________ 69 year-old male with PMH of insulin-dependent DM c/b diabetic foot ulcers s/p amputations of half of left great toe and entire right great toe, PVD, HTN, HLD, GERD, and depression who p/w worsening of a previously diagnosed R third toe wound found to have worsening gangrene requiring amputation of R third toe.    # Right third toe DFU w/worsening gangrene (recent OM in same foot) with PVD in RLE  - Podiatry following. Plan for OR Wednesday, 12/4 for R third toe amputation and possible second toe intervention. Will make NPO after midnight and send pre-op labs. F/u Podiatry recs post-procedure  - Vascular surgery (Dr. Espinosa) consulted. Patient recently discharged with follow-up scheduled with Dr. Espinosa after angiogram revealed moderate R tibial disease  - ID (Dr. Gary Turpin) consulted. Had PICC line placed prior to discharge on last admission (discharged 11/21) receiving IV Rocephin 2 g qD and PO Flagyl 500 mg qD. Will continue for now  - Will c/w IV Rocephin and PO Flagyl for now. F/u ID recs    # Insulin-dependent T2DM  - Takes Humalin 70/30 50U at breakfast/40U at night, Trulicity 0.5 weekly, and Janumet  BID outpatient  - TDD insulin: 24U Lantus at bedtime, 8U Lispro with meals. Will give 1/3 of Lantus tonight as will be NPO for procedure tomorrow. Insulin sliding scale ordered for additional coverage  - Monitor fingersticks with meals and at bedtime    # Hypertension  - Takes Benazepril 40 mg qD at home  - C/w Lisinopril 40 mg qD for now  - Monitor BP    # Hyperlipidemia  - Takes Crestor 10 mg qHS at home  - C/w Atorvastatin 40 mg qHS for now    # GERD  - Takes Omeprazole 20 mg qD at home  - C/w PO Protonix 40 mg qD for now    # Depression  - Was taking Sertraline 50 mg qD. Per patient and pharmacy, has not been taking medication since August  - Follow-up outpatient for resumption of medication  - Monitor for any SI/HI while inpatient    #DVT ppx: SCDs   #GI ppx: Protonix  #Diet: DASH/TLC consistent carb. NPO after midnight  #Activity: OOBTC  #Code status: FULL CODE  #Dispo: Med-surg. From home  #Follow-up: ________Podiatry OR 12/4, Vascular consult, ID consult____________________ 69 year-old male with PMH of insulin-dependent DM c/b diabetic foot ulcers s/p amputations of half of left great toe and entire right great toe, PVD, HTN, HLD, GERD, and depression who p/w worsening of a previously diagnosed R third toe wound found to have worsening gangrene requiring amputation of R third toe.    # Right third toe DFU w/worsening gangrene (recent OM in same foot) with PVD in RLE  - Afebrile on admission. TMax in .0 F. + Leukocytosis (13.2) with neutrophilic predominance. Tylenol PRN  - Podiatry following. Plan for OR Wednesday, 12/4 for R third toe amputation and possible second toe intervention. Will make NPO after midnight and send pre-op labs. F/u Podiatry recs post-procedure  - Vascular surgery (Dr. Espinosa) consulted. Patient recently discharged with follow-up scheduled with Dr. Espinosa after angiogram revealed moderate R tibial disease  - ID (Dr. Gary Turpin) consulted. Had PICC line placed prior to discharge on last admission (discharged 11/21) receiving IV Rocephin 2 g qD and PO Flagyl 500 mg qD. Will continue for now  - Will c/w IV Rocephin and PO Flagyl for now. F/u ID recs  - R foot XR pending. F/u results    # Dyspnea with fatigue, loss of appetite x3 days  - Characterizes dyspnea on exertion. Denies orthopnea or PND. No prior history of CHF. TTE ordered. F/u results  - Hgb 9.5 (baseline ~10.0). Monitor AM CBC. Transfuse <7. Keep active T+S  - CXR ordered. When compared with prior, no obvious focal consolidation but with right lower lobe dependent atelectasis as previously mentioned. F/u official read  - States sx are improving since being admitted  - Blood cultures ordered. F/u results    # Insulin-dependent T2DM  - Takes Humalin 70/30 50U at breakfast/40U at night, Trulicity 0.5 weekly, and Janumet  BID outpatient  - TDD insulin: 24U Lantus at bedtime, 8U Lispro with meals. Will give 1/3 of Lantus tonight as will be NPO for procedure tomorrow. Insulin sliding scale ordered for additional coverage  - Monitor fingersticks with meals and at bedtime    # Hypertension  - Takes Benazepril 40 mg qD at home  - C/w Lisinopril 40 mg qD for now  - Monitor BP    # Hyperlipidemia  - Takes Crestor 10 mg qHS at home  - C/w Atorvastatin 40 mg qHS for now    # GERD  - Takes Omeprazole 20 mg qD at home  - C/w PO Protonix 40 mg qD for now    # Depression  - Was taking Sertraline 50 mg qD. Per patient and pharmacy, has not been taking medication since August  - Follow-up outpatient for resumption of medication  - Monitor for any SI/HI while inpatient    #DVT ppx: SCDs   #GI ppx: Protonix  #Diet: DASH/TLC consistent carb. NPO after midnight  #Activity: OOBTC  #Code status: FULL CODE  #Dispo: Med-surg. From home  #Follow-up: ________Podiatry OR 12/4, Vascular consult, ID consult____________________ 69 year-old male with PMH of insulin-dependent DM c/b diabetic foot ulcers s/p amputations of half of left great toe and entire right great toe, PVD, HTN, HLD, GERD, and depression who p/w worsening of a previously diagnosed R third toe wound found to have worsening gangrene requiring amputation of R third toe.    # Right third toe DFU w/worsening gangrene (recent OM in same foot) with PVD in RLE  - Afebrile on admission. TMax in .0 F. + Leukocytosis (13.2) with neutrophilic predominance. Tylenol PRN  - Podiatry following. Plan for OR Wednesday, 12/4 for R third toe amputation and possible second toe intervention. Will make NPO after midnight and send pre-op labs. F/u Podiatry recs post-procedure  - Vascular surgery (Dr. Espinosa) consulted. Patient recently discharged with follow-up scheduled with Dr. Espinosa after angiogram revealed moderate R tibial disease  - ID (Dr. Gary Turpin) consulted. Had PICC line placed prior to discharge on last admission (discharged 11/21) receiving IV Rocephin 2 g qD and PO Flagyl 500 mg qD. Will continue for now  - Will c/w IV Rocephin and PO Flagyl for now. F/u ID recs  - R foot XR pending. F/u results  - ESR elevated at 70. CRP pending    # Dyspnea with fatigue, loss of appetite x3 days  - Characterizes dyspnea on exertion. Denies orthopnea or PND. No prior history of CHF. TTE ordered. F/u results  - Hgb 9.5 (baseline ~10.0). Monitor AM CBC. Transfuse <7. Keep active T+S  - CXR ordered. When compared with prior, no obvious focal consolidation but with right lower lobe dependent atelectasis as previously mentioned. F/u official read  - States sx are improving since being admitted  - Blood cultures ordered. F/u results    # Insulin-dependent T2DM  - Takes Humalin 70/30 50U at breakfast/40U at night, Trulicity 0.5 weekly, and Janumet  BID outpatient  - TDD insulin: 24U Lantus at bedtime, 8U Lispro with meals. Will give 1/3 of Lantus tonight as will be NPO for procedure tomorrow. Insulin sliding scale ordered for additional coverage  - Monitor fingersticks with meals and at bedtime    # Hypertension  - Takes Benazepril 40 mg qD at home  - C/w Lisinopril 40 mg qD for now  - Monitor BP    # Hyperlipidemia  - Takes Crestor 10 mg qHS at home  - C/w Atorvastatin 40 mg qHS for now    # GERD  - Takes Omeprazole 20 mg qD at home  - C/w PO Protonix 40 mg qD for now    # Depression  - Was taking Sertraline 50 mg qD. Per patient and pharmacy, has not been taking medication since August  - Follow-up outpatient for resumption of medication  - Monitor for any SI/HI while inpatient    #DVT ppx: SCDs   #GI ppx: Protonix  #Diet: DASH/TLC consistent carb. NPO after midnight  #Activity: OOBTC  #Code status: FULL CODE  #Dispo: Med-surg. From home  #Follow-up: ________Podiatry OR 12/4, Vascular consult, ID consult____________________ 69 year-old male with PMH of insulin-dependent DM c/b diabetic foot ulcers s/p amputations of half of left great toe and entire right great toe, PVD, HTN, HLD, GERD, and depression who p/w worsening of a previously diagnosed R third toe wound found to have worsening gangrene requiring amputation of R third toe.    # Right third toe DFU w/worsening gangrene (recent OM in same foot) with PVD in RLE  - Afebrile on admission. TMax in .0 F. + Leukocytosis (13.2) with neutrophilic predominance. Tylenol PRN  - Podiatry following. Plan for OR Wednesday, 12/4 for R third toe amputation and possible second toe intervention. Will make NPO after midnight and send pre-op labs. F/u Podiatry recs post-procedure  - Vascular surgery (Dr. Espinosa) consulted. Patient recently discharged with follow-up scheduled with Dr. Espinosa after angiogram revealed moderate R tibial disease  - ID (Dr. Gary Turpin) consulted. Had PICC line placed prior to discharge on last admission (discharged 11/21) receiving IV Rocephin 2 g qD and PO Flagyl 500 mg qD. Will continue for now  - Will c/w IV Rocephin 2 g qD and IV Flagyl 500 mg TID for now. F/u ID recs  - R foot XR pending. F/u results  - ESR elevated at 70. CRP pending    # Dyspnea with fatigue, loss of appetite x3 days  - Characterizes dyspnea on exertion. Denies orthopnea or PND. No prior history of CHF. TTE ordered. F/u results  - Hgb 9.5 (baseline ~10.0). Monitor AM CBC. Transfuse <7. Keep active T+S  - CXR ordered. When compared with prior, no obvious focal consolidation but with right lower lobe dependent atelectasis as previously mentioned. F/u official read  - States sx are improving since being admitted  - Blood cultures ordered. F/u results    # Insulin-dependent T2DM  - Takes Humalin 70/30 50U at breakfast/40U at night, Trulicity 0.5 weekly, and Janumet  BID outpatient  - TDD insulin: 40U Lantus at bedtime, 13U Lispro with meals. Will give 25U of Lantus tonight as will be NPO for procedure tomorrow. Insulin sliding scale ordered for additional coverage  - Monitor fingersticks with meals and at bedtime    # Hypertension  - Takes Benazepril 40 mg qD at home  - C/w Lisinopril 40 mg qD for now  - Monitor BP    # Hyperlipidemia  - Takes Crestor 10 mg qHS at home  - C/w Atorvastatin 40 mg qHS for now    # GERD  - Takes Omeprazole 20 mg qD at home  - C/w PO Protonix 40 mg qD for now    # Depression  - Was taking Sertraline 50 mg qD. Per patient and pharmacy, has not been taking medication since August  - Follow-up outpatient for resumption of medication  - Monitor for any SI/HI while inpatient    #DVT ppx: SCDs   #GI ppx: Protonix  #Diet: DASH/TLC consistent carb. NPO after midnight  #Activity: OOBTC  #Code status: FULL CODE  #Dispo: Med-surg. From home  #Follow-up: ________Podiatry OR 12/4, Vascular consult, ID consult____________________

## 2019-12-04 ENCOUNTER — TRANSCRIPTION ENCOUNTER (OUTPATIENT)
Age: 69
End: 2019-12-04

## 2019-12-04 ENCOUNTER — RESULT REVIEW (OUTPATIENT)
Age: 69
End: 2019-12-04

## 2019-12-04 LAB
ANION GAP SERPL CALC-SCNC: 13 MMOL/L — SIGNIFICANT CHANGE UP (ref 7–14)
APTT BLD: 34.4 SEC — SIGNIFICANT CHANGE UP (ref 27–39.2)
BASOPHILS # BLD AUTO: 0 K/UL — SIGNIFICANT CHANGE UP (ref 0–0.2)
BASOPHILS # BLD AUTO: 0.02 K/UL — SIGNIFICANT CHANGE UP (ref 0–0.2)
BASOPHILS NFR BLD AUTO: 0 % — SIGNIFICANT CHANGE UP (ref 0–1)
BASOPHILS NFR BLD AUTO: 0.2 % — SIGNIFICANT CHANGE UP (ref 0–1)
BUN SERPL-MCNC: 25 MG/DL — HIGH (ref 10–20)
CALCIUM SERPL-MCNC: 8.5 MG/DL — SIGNIFICANT CHANGE UP (ref 8.5–10.1)
CHLORIDE SERPL-SCNC: 101 MMOL/L — SIGNIFICANT CHANGE UP (ref 98–110)
CO2 SERPL-SCNC: 23 MMOL/L — SIGNIFICANT CHANGE UP (ref 17–32)
CREAT SERPL-MCNC: 1.2 MG/DL — SIGNIFICANT CHANGE UP (ref 0.7–1.5)
CRP SERPL-MCNC: 6.93 MG/DL — HIGH (ref 0–0.4)
EOSINOPHIL # BLD AUTO: 0 K/UL — SIGNIFICANT CHANGE UP (ref 0–0.7)
EOSINOPHIL # BLD AUTO: 0.04 K/UL — SIGNIFICANT CHANGE UP (ref 0–0.7)
EOSINOPHIL NFR BLD AUTO: 0 % — SIGNIFICANT CHANGE UP (ref 0–8)
EOSINOPHIL NFR BLD AUTO: 0.4 % — SIGNIFICANT CHANGE UP (ref 0–8)
GIANT PLATELETS BLD QL SMEAR: PRESENT — SIGNIFICANT CHANGE UP
GLUCOSE BLDC GLUCOMTR-MCNC: 179 MG/DL — HIGH (ref 70–99)
GLUCOSE BLDC GLUCOMTR-MCNC: 195 MG/DL — HIGH (ref 70–99)
GLUCOSE BLDC GLUCOMTR-MCNC: 234 MG/DL — HIGH (ref 70–99)
GLUCOSE BLDC GLUCOMTR-MCNC: 311 MG/DL — HIGH (ref 70–99)
GLUCOSE SERPL-MCNC: 203 MG/DL — HIGH (ref 70–99)
HCT VFR BLD CALC: 26.4 % — LOW (ref 42–52)
HCT VFR BLD CALC: 27.9 % — LOW (ref 42–52)
HGB BLD-MCNC: 8.3 G/DL — LOW (ref 14–18)
HGB BLD-MCNC: 8.8 G/DL — LOW (ref 14–18)
HYPOCHROMIA BLD QL: SLIGHT — SIGNIFICANT CHANGE UP
IMM GRANULOCYTES NFR BLD AUTO: 0.4 % — HIGH (ref 0.1–0.3)
INR BLD: 1.85 RATIO — HIGH (ref 0.65–1.3)
LYMPHOCYTES # BLD AUTO: 0.12 K/UL — LOW (ref 1.2–3.4)
LYMPHOCYTES # BLD AUTO: 0.64 K/UL — LOW (ref 1.2–3.4)
LYMPHOCYTES # BLD AUTO: 0.9 % — LOW (ref 20.5–51.1)
LYMPHOCYTES # BLD AUTO: 7.1 % — LOW (ref 20.5–51.1)
MAGNESIUM SERPL-MCNC: 1.6 MG/DL — LOW (ref 1.8–2.4)
MANUAL SMEAR VERIFICATION: SIGNIFICANT CHANGE UP
MCHC RBC-ENTMCNC: 28 PG — SIGNIFICANT CHANGE UP (ref 27–31)
MCHC RBC-ENTMCNC: 28.1 PG — SIGNIFICANT CHANGE UP (ref 27–31)
MCHC RBC-ENTMCNC: 31.4 G/DL — LOW (ref 32–37)
MCHC RBC-ENTMCNC: 31.5 G/DL — LOW (ref 32–37)
MCV RBC AUTO: 88.9 FL — SIGNIFICANT CHANGE UP (ref 80–94)
MCV RBC AUTO: 89.5 FL — SIGNIFICANT CHANGE UP (ref 80–94)
MONOCYTES # BLD AUTO: 0.73 K/UL — HIGH (ref 0.1–0.6)
MONOCYTES # BLD AUTO: 0.87 K/UL — HIGH (ref 0.1–0.6)
MONOCYTES NFR BLD AUTO: 5.3 % — SIGNIFICANT CHANGE UP (ref 1.7–9.3)
MONOCYTES NFR BLD AUTO: 9.7 % — HIGH (ref 1.7–9.3)
NEUTROPHILS # BLD AUTO: 12.99 K/UL — HIGH (ref 1.4–6.5)
NEUTROPHILS # BLD AUTO: 7.35 K/UL — HIGH (ref 1.4–6.5)
NEUTROPHILS NFR BLD AUTO: 82.2 % — HIGH (ref 42.2–75.2)
NEUTROPHILS NFR BLD AUTO: 93.8 % — HIGH (ref 42.2–75.2)
NRBC # BLD: 0 /100 WBCS — SIGNIFICANT CHANGE UP (ref 0–0)
PLAT MORPH BLD: ABNORMAL
PLATELET # BLD AUTO: 272 K/UL — SIGNIFICANT CHANGE UP (ref 130–400)
PLATELET # BLD AUTO: 292 K/UL — SIGNIFICANT CHANGE UP (ref 130–400)
POIKILOCYTOSIS BLD QL AUTO: SLIGHT — SIGNIFICANT CHANGE UP
POLYCHROMASIA BLD QL SMEAR: SLIGHT — SIGNIFICANT CHANGE UP
POTASSIUM SERPL-MCNC: 5 MMOL/L — SIGNIFICANT CHANGE UP (ref 3.5–5)
POTASSIUM SERPL-SCNC: 5 MMOL/L — SIGNIFICANT CHANGE UP (ref 3.5–5)
PROTHROM AB SERPL-ACNC: 21.1 SEC — HIGH (ref 9.95–12.87)
RBC # BLD: 2.95 M/UL — LOW (ref 4.7–6.1)
RBC # BLD: 3.14 M/UL — LOW (ref 4.7–6.1)
RBC # FLD: 13.4 % — SIGNIFICANT CHANGE UP (ref 11.5–14.5)
RBC # FLD: 13.6 % — SIGNIFICANT CHANGE UP (ref 11.5–14.5)
RBC BLD AUTO: ABNORMAL
SODIUM SERPL-SCNC: 137 MMOL/L — SIGNIFICANT CHANGE UP (ref 135–146)
WBC # BLD: 13.85 K/UL — HIGH (ref 4.8–10.8)
WBC # BLD: 8.96 K/UL — SIGNIFICANT CHANGE UP (ref 4.8–10.8)
WBC # FLD AUTO: 13.85 K/UL — HIGH (ref 4.8–10.8)
WBC # FLD AUTO: 8.96 K/UL — SIGNIFICANT CHANGE UP (ref 4.8–10.8)

## 2019-12-04 PROCEDURE — 97597 DBRDMT OPN WND 1ST 20 CM/<: CPT | Mod: 59

## 2019-12-04 PROCEDURE — 99223 1ST HOSP IP/OBS HIGH 75: CPT | Mod: AI

## 2019-12-04 PROCEDURE — 93306 TTE W/DOPPLER COMPLETE: CPT | Mod: 26

## 2019-12-04 PROCEDURE — 88304 TISSUE EXAM BY PATHOLOGIST: CPT | Mod: 26

## 2019-12-04 PROCEDURE — 88311 DECALCIFY TISSUE: CPT | Mod: 26

## 2019-12-04 PROCEDURE — 73630 X-RAY EXAM OF FOOT: CPT | Mod: 26,RT

## 2019-12-04 PROCEDURE — 28810 AMPUTATION TOE & METATARSAL: CPT

## 2019-12-04 RX ORDER — MAGNESIUM SULFATE 500 MG/ML
2 VIAL (ML) INJECTION ONCE
Refills: 0 | Status: COMPLETED | OUTPATIENT
Start: 2019-12-04 | End: 2019-12-04

## 2019-12-04 RX ORDER — MORPHINE SULFATE 50 MG/1
2 CAPSULE, EXTENDED RELEASE ORAL EVERY 6 HOURS
Refills: 0 | Status: DISCONTINUED | OUTPATIENT
Start: 2019-12-04 | End: 2019-12-10

## 2019-12-04 RX ORDER — CEFTRIAXONE 500 MG/1
2000 INJECTION, POWDER, FOR SOLUTION INTRAMUSCULAR; INTRAVENOUS EVERY 24 HOURS
Refills: 0 | Status: DISCONTINUED | OUTPATIENT
Start: 2019-12-04 | End: 2019-12-06

## 2019-12-04 RX ORDER — MORPHINE SULFATE 50 MG/1
4 CAPSULE, EXTENDED RELEASE ORAL EVERY 6 HOURS
Refills: 0 | Status: DISCONTINUED | OUTPATIENT
Start: 2019-12-04 | End: 2019-12-10

## 2019-12-04 RX ORDER — INSULIN GLARGINE 100 [IU]/ML
40 INJECTION, SOLUTION SUBCUTANEOUS AT BEDTIME
Refills: 0 | Status: DISCONTINUED | OUTPATIENT
Start: 2019-12-04 | End: 2019-12-10

## 2019-12-04 RX ORDER — NIFEDIPINE 30 MG
60 TABLET, EXTENDED RELEASE 24 HR ORAL DAILY
Refills: 0 | Status: DISCONTINUED | OUTPATIENT
Start: 2019-12-04 | End: 2019-12-10

## 2019-12-04 RX ORDER — ONDANSETRON 8 MG/1
4 TABLET, FILM COATED ORAL ONCE
Refills: 0 | Status: DISCONTINUED | OUTPATIENT
Start: 2019-12-04 | End: 2019-12-04

## 2019-12-04 RX ORDER — ASPIRIN/CALCIUM CARB/MAGNESIUM 324 MG
81 TABLET ORAL DAILY
Refills: 0 | Status: DISCONTINUED | OUTPATIENT
Start: 2019-12-04 | End: 2019-12-10

## 2019-12-04 RX ORDER — INSULIN LISPRO 100/ML
VIAL (ML) SUBCUTANEOUS
Refills: 0 | Status: DISCONTINUED | OUTPATIENT
Start: 2019-12-04 | End: 2019-12-10

## 2019-12-04 RX ORDER — HYDROMORPHONE HYDROCHLORIDE 2 MG/ML
0.5 INJECTION INTRAMUSCULAR; INTRAVENOUS; SUBCUTANEOUS
Refills: 0 | Status: DISCONTINUED | OUTPATIENT
Start: 2019-12-04 | End: 2019-12-04

## 2019-12-04 RX ORDER — INSULIN LISPRO 100/ML
13 VIAL (ML) SUBCUTANEOUS
Refills: 0 | Status: DISCONTINUED | OUTPATIENT
Start: 2019-12-04 | End: 2019-12-04

## 2019-12-04 RX ORDER — ATORVASTATIN CALCIUM 80 MG/1
40 TABLET, FILM COATED ORAL AT BEDTIME
Refills: 0 | Status: DISCONTINUED | OUTPATIENT
Start: 2019-12-04 | End: 2019-12-10

## 2019-12-04 RX ORDER — HYDROMORPHONE HYDROCHLORIDE 2 MG/ML
1 INJECTION INTRAMUSCULAR; INTRAVENOUS; SUBCUTANEOUS
Refills: 0 | Status: DISCONTINUED | OUTPATIENT
Start: 2019-12-04 | End: 2019-12-04

## 2019-12-04 RX ORDER — GLUCAGON INJECTION, SOLUTION 0.5 MG/.1ML
1 INJECTION, SOLUTION SUBCUTANEOUS ONCE
Refills: 0 | Status: DISCONTINUED | OUTPATIENT
Start: 2019-12-04 | End: 2019-12-09

## 2019-12-04 RX ORDER — DEXTROSE 50 % IN WATER 50 %
25 SYRINGE (ML) INTRAVENOUS ONCE
Refills: 0 | Status: DISCONTINUED | OUTPATIENT
Start: 2019-12-04 | End: 2019-12-09

## 2019-12-04 RX ORDER — DEXTROSE 50 % IN WATER 50 %
15 SYRINGE (ML) INTRAVENOUS ONCE
Refills: 0 | Status: DISCONTINUED | OUTPATIENT
Start: 2019-12-04 | End: 2019-12-09

## 2019-12-04 RX ORDER — PANTOPRAZOLE SODIUM 20 MG/1
40 TABLET, DELAYED RELEASE ORAL
Refills: 0 | Status: DISCONTINUED | OUTPATIENT
Start: 2019-12-04 | End: 2019-12-10

## 2019-12-04 RX ORDER — CHLORHEXIDINE GLUCONATE 213 G/1000ML
1 SOLUTION TOPICAL
Refills: 0 | Status: DISCONTINUED | OUTPATIENT
Start: 2019-12-04 | End: 2019-12-10

## 2019-12-04 RX ORDER — LISINOPRIL 2.5 MG/1
40 TABLET ORAL DAILY
Refills: 0 | Status: DISCONTINUED | OUTPATIENT
Start: 2019-12-04 | End: 2019-12-10

## 2019-12-04 RX ORDER — SODIUM CHLORIDE 9 MG/ML
1000 INJECTION INTRAMUSCULAR; INTRAVENOUS; SUBCUTANEOUS
Refills: 0 | Status: DISCONTINUED | OUTPATIENT
Start: 2019-12-04 | End: 2019-12-04

## 2019-12-04 RX ORDER — INSULIN LISPRO 100/ML
13 VIAL (ML) SUBCUTANEOUS
Refills: 0 | Status: DISCONTINUED | OUTPATIENT
Start: 2019-12-04 | End: 2019-12-09

## 2019-12-04 RX ORDER — ACETAMINOPHEN 500 MG
650 TABLET ORAL EVERY 6 HOURS
Refills: 0 | Status: DISCONTINUED | OUTPATIENT
Start: 2019-12-04 | End: 2019-12-10

## 2019-12-04 RX ORDER — SODIUM CHLORIDE 9 MG/ML
1000 INJECTION, SOLUTION INTRAVENOUS
Refills: 0 | Status: DISCONTINUED | OUTPATIENT
Start: 2019-12-04 | End: 2019-12-09

## 2019-12-04 RX ORDER — METRONIDAZOLE 500 MG
500 TABLET ORAL EVERY 8 HOURS
Refills: 0 | Status: DISCONTINUED | OUTPATIENT
Start: 2019-12-04 | End: 2019-12-09

## 2019-12-04 RX ORDER — DEXTROSE 50 % IN WATER 50 %
12.5 SYRINGE (ML) INTRAVENOUS ONCE
Refills: 0 | Status: DISCONTINUED | OUTPATIENT
Start: 2019-12-04 | End: 2019-12-09

## 2019-12-04 RX ADMIN — CEFTRIAXONE 100 MILLIGRAM(S): 500 INJECTION, POWDER, FOR SOLUTION INTRAMUSCULAR; INTRAVENOUS at 05:04

## 2019-12-04 RX ADMIN — Medication 100 MILLIGRAM(S): at 13:22

## 2019-12-04 RX ADMIN — SODIUM CHLORIDE 100 MILLILITER(S): 9 INJECTION INTRAMUSCULAR; INTRAVENOUS; SUBCUTANEOUS at 16:17

## 2019-12-04 RX ADMIN — Medication 50 GRAM(S): at 08:58

## 2019-12-04 RX ADMIN — Medication 100 MILLIGRAM(S): at 21:47

## 2019-12-04 RX ADMIN — Medication 60 MILLIGRAM(S): at 05:04

## 2019-12-04 RX ADMIN — LISINOPRIL 40 MILLIGRAM(S): 2.5 TABLET ORAL at 05:04

## 2019-12-04 RX ADMIN — ATORVASTATIN CALCIUM 40 MILLIGRAM(S): 80 TABLET, FILM COATED ORAL at 21:43

## 2019-12-04 RX ADMIN — INSULIN GLARGINE 40 UNIT(S): 100 INJECTION, SOLUTION SUBCUTANEOUS at 21:41

## 2019-12-04 RX ADMIN — Medication 100 MILLIGRAM(S): at 05:05

## 2019-12-04 RX ADMIN — Medication 650 MILLIGRAM(S): at 18:58

## 2019-12-04 RX ADMIN — Medication 650 MILLIGRAM(S): at 18:28

## 2019-12-04 RX ADMIN — PANTOPRAZOLE SODIUM 40 MILLIGRAM(S): 20 TABLET, DELAYED RELEASE ORAL at 05:04

## 2019-12-04 NOTE — PROGRESS NOTE ADULT - SUBJECTIVE AND OBJECTIVE BOX
PROGRESS NOTE   Pt seen @ bedside during AM rounds, w/ Attending. Dressing +Clean, +Dry, +Intact. Pt. denies any recent n/f/v/c/sob. Pt. denies any other pedal complaints at this time.      Vital Signs Last 24 Hrs  T(C): 37.4 (04 Dec 2019 04:52), Max: 37.8 (03 Dec 2019 13:18)  T(F): 99.3 (04 Dec 2019 04:52), Max: 100 (03 Dec 2019 13:18)  HR: 84 (04 Dec 2019 04:52) (78 - 90)  BP: 111/58 (04 Dec 2019 04:52) (111/58 - 181/81)  BP(mean): --  RR: 18 (04 Dec 2019 04:52) (17 - 18)  SpO2: 98% (04 Dec 2019 00:12) (98% - 100%)                          8.8    8.96  )-----------( 292      ( 04 Dec 2019 05:31 )             27.9               12-04    137  |  101  |  25<H>  ----------------------------<  203<H>  5.0   |  23  |  1.2    Ca    8.5      04 Dec 2019 05:31  Mg     1.6     12-04    TPro  6.5  /  Alb  3.3<L>  /  TBili  0.4  /  DBili  x   /  AST  13  /  ALT  9   /  AlkPhos  206<H>  12-03      PHYSICAL EXAM  Right LE Focused:  Derm:   Ulceration Right SUB met 2  - Wound Edges  + hyperkeratotic border, +Undermining  - Wound base Granular/Fibrotic, wound size (2.2 cm X 0.6 cm X 1.6cm), - fluctuance, + probe to deep tissue and capsule but not to bone , +Tunneling,   - Rose-wound skin - Erythema, - Streaking erythema, + Warmth  + Edema,  - No Drainage   Ischemic changes 2nd toe with overlying blister  Necrotic 3rd toe - No drainage  Vascular:   DP and PT pulses 0/4 R  Neuro:  - Protective sensation diminished.   MSK:   Manual Muscle strength 5/5 in all muscle compartments R foot. Amp R hallux    Assessment:  Dry Gangrene R 3rd toe  Ischemic changes R 2nd toe  DFU R Sub Met 2  PAD   osteomyelitis     Plan:  Pt. was seen and evaluated.  Discussed treatment and condition w/ patient  Discussed w/ patient that possible sx will be warranted in the near future for further amputation given the extent of the infection;if pt still proceeds to undergo only amputation of the 3rd ray as opposed to  transmetatarsal amputation of the right foot.  Pt. agrees to thinking over today's procedure and will possibly proceed on having the TMA procedure done today instead of 3rd ray resection  Applied/betadine/DSD/kerlix to right foot.

## 2019-12-04 NOTE — CONSULT NOTE ADULT - CONSULT REASON
[facility]  Clinic Note    Tiki Cheung  : 10/25/1929  PCP: Mary Baxter MD    Reason for Consultation:  Chief Complaint   Patient presents with   • Follow-up     Atrial fibrillation         Assessment/Plan:  Tiki Cheung is a 89 year old woman with sick sinus, status post ppm implant. She denies chest pain, dizziness or syncope.     Pacer check is stable, normal measurements, no significant mode switch episodes.       1) Routine follow up in the pacer clinic.     2) Continue current medications.           Follow up: 6 months    History of Present Illness:  Tiki Cheung is a 89 year old woman with PMHx of follow-up visit for a 89-year-old female with a history of sinus node dysfunction status post dual-chamber pacemaker.  Pacemaker interrogation demonstrates normal pacing and sensing function, no episodes of mode switching.     She reports some degree of fatigue, and echo within the last year demonstrates preserved systolic function.  Her primary care physician told her she had mild anemia, she is increased her red meat consumption.     She had a recent fall, she walks with the aid of a walker.     PMHx:  Past Medical History:   Diagnosis Date   • Essential hypertension    • Paroxysmal atrial fibrillation (CMS/HCC)    • Spinal stenosis    • TIA (transient ischemic attack)     ?TIA        PSHx:  Past Surgical History:   Procedure Laterality Date   • Cardiac device check - in clinic       permanent pacemaker   • Other surgical history      [email or web address]       Family Hx:  Family History   Problem Relation Age of Onset   • Coronary Artery Disease Neg Hx         Negative for premature CAD   • Aneurysm Neg Hx         Negative for AAA       Social Hx:  she  reports that she has never smoked. She has never used smokeless tobacco.    Allergies:  ALLERGIES:   Allergen Reactions   • Catapres RASH     Oral   • Sulfamethoxazole-Trimethoprim VOMITING     Oral   • Thiazide-Type Diuretics  Other (See Comments)     CLASS       Medications:  Current Outpatient Medications   Medication Sig Dispense Refill   • sotalol (BETAPACE) 80 MG tablet TAKE 1/2 TABLET BY MOUTH 3 TIMES A  tablet 0   • calcium carbonate-vitamin D (CALCIUM 500 + D) 500-125 MG-UNIT tablet 1 tablet 2 times daily.     • Unable to Find Medication Fish Oil, 1000MG, 1 tab twice daily     • gabapentin (GRALISE) 300 MG extended-release tablet 1 tablet twice daily     • Unable to Find Medication [employer] [facility], 1 TABLET DAILY     • warfarin (COUMADIN) 5 MG tablet as directed     • amLODIPine (NORVASC) 5 MG tablet 1 tablet 2 times daily.     • lisinopril (PRINIVIL,ZESTRIL) 40 MG tablet Take 1 tablet by mouth every morning.     • Multiple Vitamins-Minerals (OCUVITE PO) 1 tablet two times daily       No current facility-administered medications for this visit.        Review of Systems:  All systems reviewed and negative.     Physical Exam:  Visit Vitals  /60   Pulse 62   Ht 5' 4\" (1.626 m)   Wt 71.2 kg (157 lb)   BMI 26.95 kg/m²       Gen: no acute distress  Neuro: alert and oriented x 3  HEENT:Normal  CV: normal S1, S2  Pulm: CTAB  GI: soft, non-tender, non-distended  Ext: warm, well perfused, no edema  Skin: warm, dry      Labs:  No visits with results within 3 Month(s) from this visit.   Latest known visit with results is:   Prior Original Records on 09/18/2018   Component Date Value Ref Range Status   • INR 09/18/2018 0.00   Final   ]        Data:        Diagnosis:  There is no problem list on file for this patient.      Jacqueline Donohue MD   Gangrenous Right 3rd Toe w/ Plantar Ulcer

## 2019-12-04 NOTE — CONSULT NOTE ADULT - SUBJECTIVE AND OBJECTIVE BOX
JOSÉ MANUEL PORTER  69y, Male  Allergy: No Known Allergies    CHIEF COMPLAINT: Worsening Right Diabetic Foot Ulcer (04 Dec 2019 09:41)    HPI:  69 year-old male with PMH of insulin-dependent DM c/b diabetic foot ulcers s/p amputations of half of left great toe and entire right great toe, PVD, HTN, HLD, GERD, and depression who presents with worsening of a previously diagnosed R third toe wound. Patient was recently admitted to [facility] from 11/14-11/21 for worsening of the same diabetic foot ulcer of right foot with OM with cultures growing MSSA; patient had PICC line placed prior to discharge and was receiving PO Flagyl 500 mg BID and IV Rocephin 2 g qD with scheduled ID (Dr. Gary Turpin) follow-up.    Today, patient presents with mild pain and numbness in right third toe. Per patient and wife at bedside, he had appointment with Vascular Surgery scheduled for today but decided to come to the ED due to worsening appearance of toe (was starting to appear darker) in addition to feeling short of breath, fatigue, and loss of appetite for past three days. + Pain in R foot, + loss of appetite, + fatigue, + shortness of breath x3 days. Denies fever/chills/HA/change in vision/rhinorrhea/sore throat/cough/chest pain/abdominal pain/constipation/diarrhea. (03 Dec 2019 13:34)    FAMILY HISTORY:  Family history of ischemic heart disease (IHD) (Father)  Family history of lung cancer (Mother)    PAST MEDICAL & SURGICAL HISTORY:  GERD (gastroesophageal reflux disease)  Depression  Diabetic foot ulcer  Dyslipidemia  HTN (hypertension)  DM (diabetes mellitus): 12/27/18 hgb aic  11.2  Toe amputation status, right: (2008)    SOCIAL HISTORY    Substance Use (  ) never used  (  ) IVDU (  ) Other:  Tobacco Usage:  (   ) never smoked   (   ) former smoker   (   ) current smoker   Alcohol Usage: (   ) social  (   ) daily use (   ) denies  Sexual History:     ROS  General: Denies rigors, nightsweats  HEENT: Denies headache, rhinorrhea, sore throat, eye pain  CV: Denies CP, palpitations  PULM: Denies wheezing, hemoptysis  GI: Denies hematemesis, hematochezia, melena  : Denies discharge, hematuria  MSK: Denies arthralgias, myalgias  SKIN: Denies rash, lesions  NEURO: Denies paresthesias, weakness  PSYCH: Denies depression, anxiety    VITALS:  T(F): 99.3, Max: 100 (12-03-19 @ 13:18)  HR: 84  BP: 111/58  RR: 18Vital Signs Last 24 Hrs  T(C): 37.4 (04 Dec 2019 04:52), Max: 37.8 (03 Dec 2019 13:18)  T(F): 99.3 (04 Dec 2019 04:52), Max: 100 (03 Dec 2019 13:18)  HR: 84 (04 Dec 2019 04:52) (78 - 90)  BP: 111/58 (04 Dec 2019 04:52) (111/58 - 181/81)  BP(mean): --  RR: 18 (04 Dec 2019 04:52) (17 - 18)  SpO2: 98% (04 Dec 2019 00:12) (98% - 100%)    PHYSICAL EXAM:  Gen: NAD, resting in bed  HEENT: Normocephalic, atraumatic  Neck: supple, no lymphadenopathy  CV: Regular rate & regular rhythm  Lungs: decreased BS at bases  Abdomen: Soft, BS present  Ext: Warm, well perfused  Neuro: non focal, awake  Skin: no rash, no erythema    TESTS & MEASUREMENTS:                        8.8    8.96  )-----------( 292      ( 04 Dec 2019 05:31 )             27.9     12-04    137  |  101  |  25<H>  ----------------------------<  203<H>  5.0   |  23  |  1.2    Ca    8.5      04 Dec 2019 05:31  Mg     1.6     12-04    TPro  6.5  /  Alb  3.3<L>  /  TBili  0.4  /  DBili  x   /  AST  13  /  ALT  9   /  AlkPhos  206<H>  12-03    eGFR if Non African American: 60 mL/min/1.73M2 (12-04-19 @ 05:31)  eGFR if African American: 70 mL/min/1.73M2 (12-04-19 @ 05:31)    LIVER FUNCTIONS - ( 03 Dec 2019 11:00 )  Alb: 3.3 g/dL / Pro: 6.5 g/dL / ALK PHOS: 206 U/L / ALT: 9 U/L / AST: 13 U/L / GGT: x           INFECTIOUS DISEASES TESTING    RADIOLOGY & ADDITIONAL TESTS:  I have personally reviewed the last Chest xray  CXR  Xray Chest 1 View- PORTABLE-Urgent:   EXAM:  XR CHEST PORTABLE URGENT 1V          PROCEDURE DATE:  12/03/2019      INTERPRETATION:  Clinical History / Reason for exam: Preop.    Comparison : Chest radiograph 11/19/2019.    Technique/Positioning: AP portable chest radiograph, satisfactory.    Findings:    Support devices: Right-sided central PICC line terminating at the   atriocaval junction.    Cardiac/mediastinum/hilum: Unchanged.    Lung parenchyma/Pleura: No evidence of pleural effusion, pneumothorax or   focal opacity. Stable minimal thickening of the right fissure.    Skeleton/soft tissues: Unchanged    Impression:    Right-sided PICC line  No radiographic evidence of acute cardiopulmonary disease.    GERARD DAY M.D., RESIDENT RADIOLOGIST  This document has been electronically signed.  KATHY BENITEZ M.D., ATTENDING RADIOLOGIST  This document has been electronically signed. Dec  4 2019 10:03AM       (12-03-19 @ 16:22)    CT    CARDIOLOGY TESTING  12 Lead ECG:   Ventricular Rate 87 BPM    Atrial Rate 87 BPM    P-R Interval 132 ms    QRS Duration 66 ms    Q-T Interval 372 ms    QTC Calculation(Bezet) 447 ms    P Axis 64 degrees    R Axis 24 degrees    T Axis 51 degrees    Diagnosis Line Normal sinus rhythm  Nonspecific T wave abnormality  Abnormal ECG    Confirmed by DAVE COWAN MD ([id number]) on 12/3/2019 3:27:36 PM (12-03-19 @ 11:55)      All available historical records have been reviewed    MEDICATIONS  aspirin enteric coated 81  atorvastatin 40  cefTRIAXone   IVPB 2000  chlorhexidine 4% Liquid 1  dextrose 5%. 1000  dextrose 50% Injectable 12.5  dextrose 50% Injectable 25  dextrose 50% Injectable 25  influenza   Vaccine 0.5  insulin glargine Injectable (LANTUS) 40  insulin lispro (HumaLOG) corrective regimen sliding scale   insulin lispro Injectable (HumaLOG) 13  insulin lispro Injectable (HumaLOG) 13  insulin lispro Injectable (HumaLOG) 13  lisinopril 40  metroNIDAZOLE  IVPB 500  NIFEdipine XL 60  pantoprazole    Tablet 40    ANTIBIOTICS:  cefTRIAXone   IVPB 2000 milliGRAM(s) IV Intermittent every 24 hours  metroNIDAZOLE  IVPB 500 milliGRAM(s) IV Intermittent every 8 hours    All available historical data has been reviewed    ASSESSMENT  70 y/o Male with a PMH of DM, Previous DFU's, PVD, HTN, HLD, GERD, Depresion. s/p Right Hallux Amputation    IMPRESSION  #Gangrenous Right 3rd Digit w/ Chronic Diabetic Pressure Ulcer Plantar Aspect  #Previous Admission; Grew MSSA; (PICC 11/14-11/21); Ceftriaxone 2g QD and Flagyl 0.5g BID  #Podiatry; Surgery Scheduled for Right 3rd Digit Amputation w/ Possible 2nd Digit     RECOMMENDATIONS  - Follow Up w/ OR Wound Cultures and Bone Cultures  - Continue w/ Ceftriaxone 2g q24 and Flagyl 0.5g Q8     This is a pended note. All final recommendations to follow pending discussion with ID Attending JOSÉ MANUEL PORTER  69y, Male  Allergy: No Known Allergies    CHIEF COMPLAINT: Worsening Right Diabetic Foot Ulcer (04 Dec 2019 09:41)    HPI:  69 year-old male with PMH of insulin-dependent DM c/b diabetic foot ulcers s/p amputations of half of left great toe and entire right great toe, PVD, HTN, HLD, GERD, and depression who presents with worsening of a previously diagnosed R third toe wound. Patient was recently admitted to [facility] from 11/14-11/21 for worsening of the same diabetic foot ulcer of right foot with OM with cultures growing MSSA; patient had PICC line placed prior to discharge and was receiving PO Flagyl 500 mg BID and IV Rocephin 2 g qD with scheduled ID (Dr. Gary Turpin) follow-up.    Today, patient presents with mild pain and numbness in right third toe. Per patient and wife at bedside, he had appointment with Vascular Surgery scheduled for today but decided to come to the ED due to worsening appearance of toe (was starting to appear darker) in addition to feeling short of breath, fatigue, and loss of appetite for past three days. + Pain in R foot, + loss of appetite, + fatigue, + shortness of breath x3 days. Denies fever/chills/HA/change in vision/rhinorrhea/sore throat/cough/chest pain/abdominal pain/constipation/diarrhea. (03 Dec 2019 13:34)    FAMILY HISTORY:  Family history of ischemic heart disease (IHD) (Father)  Family history of lung cancer (Mother)    PAST MEDICAL & SURGICAL HISTORY:  GERD (gastroesophageal reflux disease)  Depression  Diabetic foot ulcer  Dyslipidemia  HTN (hypertension)  DM (diabetes mellitus): 12/27/18 hgb aic  11.2  Toe amputation status, right: (2008)    SOCIAL HISTORY    Substance Use (  ) never used  (  ) IVDU (  ) Other:  Tobacco Usage:  (   ) never smoked   (   ) former smoker   (   ) current smoker   Alcohol Usage: (   ) social  (   ) daily use (   ) denies  Sexual History:     ROS  General: Denies rigors, nightsweats  HEENT: Denies headache, rhinorrhea, sore throat, eye pain  CV: Denies CP, palpitations  PULM: Denies wheezing, hemoptysis  GI: Denies hematemesis, hematochezia, melena  : Denies discharge, hematuria  MSK: Denies arthralgias, myalgias  SKIN: Denies rash, lesions  NEURO: Denies paresthesias, weakness  PSYCH: Denies depression, anxiety    VITALS:  T(F): 99.3, Max: 100 (12-03-19 @ 13:18)  HR: 84  BP: 111/58  RR: 18Vital Signs Last 24 Hrs  T(C): 37.4 (04 Dec 2019 04:52), Max: 37.8 (03 Dec 2019 13:18)  T(F): 99.3 (04 Dec 2019 04:52), Max: 100 (03 Dec 2019 13:18)  HR: 84 (04 Dec 2019 04:52) (78 - 90)  BP: 111/58 (04 Dec 2019 04:52) (111/58 - 181/81)  BP(mean): --  RR: 18 (04 Dec 2019 04:52) (17 - 18)  SpO2: 98% (04 Dec 2019 00:12) (98% - 100%)    PHYSICAL EXAM:  Gen: NAD, resting in bed  HEENT: Normocephalic, atraumatic  Neck: supple, no lymphadenopathy  CV: Regular rate & regular rhythm  Lungs: decreased BS at bases  Abdomen: Soft, BS present  Ext: Warm, well perfused  Neuro: non focal, awake  Skin: no rash, no erythema    TESTS & MEASUREMENTS:                        8.8    8.96  )-----------( 292      ( 04 Dec 2019 05:31 )             27.9     12-04    137  |  101  |  25<H>  ----------------------------<  203<H>  5.0   |  23  |  1.2    Ca    8.5      04 Dec 2019 05:31  Mg     1.6     12-04    TPro  6.5  /  Alb  3.3<L>  /  TBili  0.4  /  DBili  x   /  AST  13  /  ALT  9   /  AlkPhos  206<H>  12-03    eGFR if Non African American: 60 mL/min/1.73M2 (12-04-19 @ 05:31)  eGFR if African American: 70 mL/min/1.73M2 (12-04-19 @ 05:31)    LIVER FUNCTIONS - ( 03 Dec 2019 11:00 )  Alb: 3.3 g/dL / Pro: 6.5 g/dL / ALK PHOS: 206 U/L / ALT: 9 U/L / AST: 13 U/L / GGT: x           INFECTIOUS DISEASES TESTING    RADIOLOGY & ADDITIONAL TESTS:  I have personally reviewed the last Chest xray  CXR  Xray Chest 1 View- PORTABLE-Urgent:   EXAM:  XR CHEST PORTABLE URGENT 1V          PROCEDURE DATE:  12/03/2019      INTERPRETATION:  Clinical History / Reason for exam: Preop.    Comparison : Chest radiograph 11/19/2019.    Technique/Positioning: AP portable chest radiograph, satisfactory.    Findings:    Support devices: Right-sided central PICC line terminating at the   atriocaval junction.    Cardiac/mediastinum/hilum: Unchanged.    Lung parenchyma/Pleura: No evidence of pleural effusion, pneumothorax or   focal opacity. Stable minimal thickening of the right fissure.    Skeleton/soft tissues: Unchanged    Impression:    Right-sided PICC line  No radiographic evidence of acute cardiopulmonary disease.    GERARD DAY M.D., RESIDENT RADIOLOGIST  This document has been electronically signed.  KATHY BENITEZ M.D., ATTENDING RADIOLOGIST  This document has been electronically signed. Dec  4 2019 10:03AM       (12-03-19 @ 16:22)    CT    CARDIOLOGY TESTING  12 Lead ECG:   Ventricular Rate 87 BPM    Atrial Rate 87 BPM    P-R Interval 132 ms    QRS Duration 66 ms    Q-T Interval 372 ms    QTC Calculation(Bezet) 447 ms    P Axis 64 degrees    R Axis 24 degrees    T Axis 51 degrees    Diagnosis Line Normal sinus rhythm  Nonspecific T wave abnormality  Abnormal ECG    Confirmed by DAVE COWAN MD ([id number]) on 12/3/2019 3:27:36 PM (12-03-19 @ 11:55)      All available historical records have been reviewed    MEDICATIONS  aspirin enteric coated 81  atorvastatin 40  cefTRIAXone   IVPB 2000  chlorhexidine 4% Liquid 1  dextrose 5%. 1000  dextrose 50% Injectable 12.5  dextrose 50% Injectable 25  dextrose 50% Injectable 25  influenza   Vaccine 0.5  insulin glargine Injectable (LANTUS) 40  insulin lispro (HumaLOG) corrective regimen sliding scale   insulin lispro Injectable (HumaLOG) 13  insulin lispro Injectable (HumaLOG) 13  insulin lispro Injectable (HumaLOG) 13  lisinopril 40  metroNIDAZOLE  IVPB 500  NIFEdipine XL 60  pantoprazole    Tablet 40    ANTIBIOTICS:  cefTRIAXone   IVPB 2000 milliGRAM(s) IV Intermittent every 24 hours  metroNIDAZOLE  IVPB 500 milliGRAM(s) IV Intermittent every 8 hours    All available historical data has been reviewed    ASSESSMENT  68 y/o Male with a PMH of DM, Previous DFU's, PVD, HTN, HLD, GERD, Depresion. s/p Right Hallux Amputation    IMPRESSION  #Gangrenous Right 3rd Digit w/ Chronic Diabetic Pressure Ulcer Plantar Aspect  #Previous Admission; Grew MSSA; (PICC 11/14-11/21); Ceftriaxone 2g QD and Flagyl 0.5g BID  #Podiatry; Surgery Scheduled for Right 3rd Digit Amputation w/ Possible 2nd Digit     RECOMMENDATIONS  - Follow Up w/ OR Cultures  - Continue w/ Ceftriaxone 2g q24 and Flagyl 0.5g Q8     This is a pended note. All final recommendations to follow pending discussion with ID Attending JOSÉ MANUEL PORTER  69y, Male  Allergy: No Known Allergies    CHIEF COMPLAINT: Worsening Right Diabetic Foot Ulcer (04 Dec 2019 09:41)    HPI:  69 year-old male with PMH of insulin-dependent DM c/b diabetic foot ulcers s/p amputations of half of left great toe and entire right great toe, PVD, HTN, HLD, GERD, and depression who presents with worsening of a previously diagnosed R third toe wound. Patient was recently admitted to [facility] from 11/14-11/21 for worsening of the same diabetic foot ulcer of right foot with OM with cultures growing MSSA; patient had PICC line placed prior to discharge and was receiving PO Flagyl 500 mg BID and IV Rocephin 2 g qD with scheduled ID (Dr. Gary Turpin) follow-up.    Today, patient presents with mild pain and numbness in right third toe. Per patient and wife at bedside, he had appointment with Vascular Surgery scheduled for today but decided to come to the ED due to worsening appearance of toe (was starting to appear darker) in addition to feeling short of breath, fatigue, and loss of appetite for past three days. + Pain in R foot, + loss of appetite, + fatigue, + shortness of breath x3 days. Denies fever/chills/HA/change in vision/rhinorrhea/sore throat/cough/chest pain/abdominal pain/constipation/diarrhea. (03 Dec 2019 13:34)    FAMILY HISTORY:  Family history of ischemic heart disease (IHD) (Father)  Family history of lung cancer (Mother)    PAST MEDICAL & SURGICAL HISTORY:  GERD (gastroesophageal reflux disease)  Depression  Diabetic foot ulcer  Dyslipidemia  HTN (hypertension)  DM (diabetes mellitus): 12/27/18 hgb aic  11.2  Toe amputation status, right: (2008)    SOCIAL HISTORY  Pt denies any current heavy ETOH use, IVDU. No recent travel outside the [place].     ROS  General: Denies rigors, nightsweats  HEENT: Denies headache, rhinorrhea, sore throat, eye pain  CV: Denies CP, palpitations  PULM: Denies wheezing, hemoptysis  GI: Denies hematemesis, hematochezia, melena  : Denies discharge, hematuria  MSK: Denies arthralgias, myalgias  SKIN: Denies rash, lesions  NEURO: Denies paresthesias, weakness  PSYCH: Denies depression, anxiety    VITALS:  T(F): 99.3, Max: 100 (12-03-19 @ 13:18)  HR: 84  BP: 111/58  RR: 18Vital Signs Last 24 Hrs  T(C): 37.4 (04 Dec 2019 04:52), Max: 37.8 (03 Dec 2019 13:18)  T(F): 99.3 (04 Dec 2019 04:52), Max: 100 (03 Dec 2019 13:18)  HR: 84 (04 Dec 2019 04:52) (78 - 90)  BP: 111/58 (04 Dec 2019 04:52) (111/58 - 181/81)  BP(mean): --  RR: 18 (04 Dec 2019 04:52) (17 - 18)  SpO2: 98% (04 Dec 2019 00:12) (98% - 100%)    PHYSICAL EXAM:  Gen: NAD, resting in bed  HEENT: Normocephalic, atraumatic  Neck: supple, no lymphadenopathy  CV: Regular rate & regular rhythm  Lungs: decreased BS at bases  Abdomen: Soft, BS present  Ext: Warm, well perfused, R plantar ulcer, fibrinous tissue, no surrounding erythema, R 3rd toe necrosis, some wet area plantar, 2nd toe some necrosis, shallow ulcer  Neuro: non focal, awake  Skin: no rash, no erythema    TESTS & MEASUREMENTS:                        8.8    8.96  )-----------( 292      ( 04 Dec 2019 05:31 )             27.9     12-04    137  |  101  |  25<H>  ----------------------------<  203<H>  5.0   |  23  |  1.2    Ca    8.5      04 Dec 2019 05:31  Mg     1.6     12-04    TPro  6.5  /  Alb  3.3<L>  /  TBili  0.4  /  DBili  x   /  AST  13  /  ALT  9   /  AlkPhos  206<H>  12-03    eGFR if Non African American: 60 mL/min/1.73M2 (12-04-19 @ 05:31)  eGFR if African American: 70 mL/min/1.73M2 (12-04-19 @ 05:31)    LIVER FUNCTIONS - ( 03 Dec 2019 11:00 )  Alb: 3.3 g/dL / Pro: 6.5 g/dL / ALK PHOS: 206 U/L / ALT: 9 U/L / AST: 13 U/L / GGT: x           INFECTIOUS DISEASES TESTING    RADIOLOGY & ADDITIONAL TESTS:  I have personally reviewed the last Chest xray  CXR  Xray Chest 1 View- PORTABLE-Urgent:   EXAM:  XR CHEST PORTABLE URGENT 1V          PROCEDURE DATE:  12/03/2019      INTERPRETATION:  Clinical History / Reason for exam: Preop.    Comparison : Chest radiograph 11/19/2019.    Technique/Positioning: AP portable chest radiograph, satisfactory.    Findings:    Support devices: Right-sided central PICC line terminating at the   atriocaval junction.    Cardiac/mediastinum/hilum: Unchanged.    Lung parenchyma/Pleura: No evidence of pleural effusion, pneumothorax or   focal opacity. Stable minimal thickening of the right fissure.    Skeleton/soft tissues: Unchanged    Impression:    Right-sided PICC line  No radiographic evidence of acute cardiopulmonary disease.    GERARD DAY M.D., RESIDENT RADIOLOGIST  This document has been electronically signed.  KATHY BENITEZ M.D., ATTENDING RADIOLOGIST  This document has been electronically signed. Dec  4 2019 10:03AM       (12-03-19 @ 16:22)    CT    CARDIOLOGY TESTING  12 Lead ECG:   Ventricular Rate 87 BPM    Atrial Rate 87 BPM    P-R Interval 132 ms    QRS Duration 66 ms    Q-T Interval 372 ms    QTC Calculation(Bezet) 447 ms    P Axis 64 degrees    R Axis 24 degrees    T Axis 51 degrees    Diagnosis Line Normal sinus rhythm  Nonspecific T wave abnormality  Abnormal ECG    Confirmed by DAVE COWAN MD ([id number]) on 12/3/2019 3:27:36 PM (12-03-19 @ 11:55)      All available historical records have been reviewed    MEDICATIONS  aspirin enteric coated 81  atorvastatin 40  cefTRIAXone   IVPB 2000  chlorhexidine 4% Liquid 1  dextrose 5%. 1000  dextrose 50% Injectable 12.5  dextrose 50% Injectable 25  dextrose 50% Injectable 25  influenza   Vaccine 0.5  insulin glargine Injectable (LANTUS) 40  insulin lispro (HumaLOG) corrective regimen sliding scale   insulin lispro Injectable (HumaLOG) 13  insulin lispro Injectable (HumaLOG) 13  insulin lispro Injectable (HumaLOG) 13  lisinopril 40  metroNIDAZOLE  IVPB 500  NIFEdipine XL 60  pantoprazole    Tablet 40    ANTIBIOTICS:  cefTRIAXone   IVPB 2000 milliGRAM(s) IV Intermittent every 24 hours  metroNIDAZOLE  IVPB 500 milliGRAM(s) IV Intermittent every 8 hours    All available historical data has been reviewed

## 2019-12-04 NOTE — PROGRESS NOTE ADULT - SUBJECTIVE AND OBJECTIVE BOX
SUBJECTIVE:    Patient is a 69y old Male who presents with a chief complaint of Worsening Right Diabetic Foot Ulcer (03 Dec 2019 13:34)    Currently admitted to medicine with the primary diagnosis of Diabetic foot ulcer     Today is hospital day 1d. This morning he is resting comfortably in bed and reports no new issues or overnight events.     ROS:   CONSTITUTIONAL: No weakness, fevers or chills   EYES/ENT: No visual changes; No vertigo or throat pain   NECK: No pain or stiffness   RESPIRATORY: No cough, wheezing, hemoptysis; No shortness of breath   CARDIOVASCULAR: No chest pain or palpitations   GASTROINTESTINAL: No abdominal or epigastric pain. No nausea, vomiting, or hematemesis; No diarrhea or constipation. No melena or hematochezia.  GENITOURINARY: No dysuria, frequency or hematuria  NEUROLOGICAL: No numbness or weakness  SKIN: No itching, rashes      PAST MEDICAL & SURGICAL HISTORY  GERD (gastroesophageal reflux disease)  Depression  Diabetic foot ulcer  Dyslipidemia  HTN (hypertension)  DM (diabetes mellitus): 12/27/18 hgb aic  11.2  Toe amputation status, right: (2008)    SOCIAL HISTORY:  Negative for smoking/alcohol/drug use.   ALLERGIES:  No Known Allergies    MEDICATIONS:  STANDING MEDICATIONS  aspirin enteric coated 81 milliGRAM(s) Oral daily  atorvastatin 40 milliGRAM(s) Oral at bedtime  cefTRIAXone   IVPB 2000 milliGRAM(s) IV Intermittent every 24 hours  chlorhexidine 4% Liquid 1 Application(s) Topical <User Schedule>  dextrose 5%. 1000 milliLiter(s) IV Continuous <Continuous>  dextrose 50% Injectable 12.5 Gram(s) IV Push once  dextrose 50% Injectable 25 Gram(s) IV Push once  dextrose 50% Injectable 25 Gram(s) IV Push once  influenza   Vaccine 0.5 milliLiter(s) IntraMuscular once  insulin glargine Injectable (LANTUS) 40 Unit(s) SubCutaneous at bedtime  insulin lispro (HumaLOG) corrective regimen sliding scale   SubCutaneous three times a day before meals  insulin lispro Injectable (HumaLOG) 13 Unit(s) SubCutaneous before breakfast  insulin lispro Injectable (HumaLOG) 13 Unit(s) SubCutaneous before lunch  insulin lispro Injectable (HumaLOG) 13 Unit(s) SubCutaneous before dinner  lisinopril 40 milliGRAM(s) Oral daily  metroNIDAZOLE  IVPB 500 milliGRAM(s) IV Intermittent every 8 hours  NIFEdipine XL 60 milliGRAM(s) Oral daily  pantoprazole    Tablet 40 milliGRAM(s) Oral before breakfast    PRN MEDICATIONS  acetaminophen   Tablet .. 650 milliGRAM(s) Oral every 6 hours PRN  dextrose 40% Gel 15 Gram(s) Oral once PRN  glucagon  Injectable 1 milliGRAM(s) IntraMuscular once PRN    VITALS:   T(F): 99.3  HR: 84  BP: 111/58  RR: 18  SpO2: 98%    LABS:                          8.8    8.96  )-----------( 292      ( 04 Dec 2019 05:31 )             27.9     12-04    137  |  101  |  25<H>  ----------------------------<  203<H>  5.0   |  23  |  1.2    Ca    8.5      04 Dec 2019 05:31  Mg     1.6     12-04    TPro  6.5  /  Alb  3.3<L>  /  TBili  0.4  /  DBili  x   /  AST  13  /  ALT  9   /  AlkPhos  206<H>  12-03    PT/INR - ( 04 Dec 2019 05:31 )   PT: 21.10 sec;   INR: 1.85 ratio         PTT - ( 04 Dec 2019 05:31 )  PTT:34.4 sec      Sedimentation Rate, Erythrocyte: 70 mm/Hr <H> (12-03-19 @ 11:00)    RADIOLOGY:  < from: MR Foot w/ IV Cont, Right (11.15.19 @ 22:49) >  IMPRESSION:  1. Medial and metatarsal fat pad ulcer with adjacent phlegmon/tissue   ischemia as detailed above  2.Third MTP septic arthritis, third metatarsal head and proximal   phalangeal base osteomyelitis  3. Second metatarsal neck osteomyelitis; second metatarsal head chronic   AVN  4. Third distal flexor tenosynovitis with enhancement, consistent with   septic arthritis    < end of copied text >    PHYSICAL EXAM:  GEN: No acute distress  HEENT: normocephalic, atraumatic, aniceteric  LUNGS: Clear to auscultation bilaterally, no rales/wheezing/ rhonchi  HEART: S1/S2 present. RRR, no murmurs  ABD: Soft, non-tender, non-distended. Bowel sounds present  EXT: right foot dressing clean dry and intact  NEURO: AAOX3, normal affect      ASSESSMENT AND PLAN:    69 year-old male with PMH of insulin-dependent DM c/b diabetic foot ulcers s/p amputations of half of left great toe and entire right great toe, PVD, HTN, HLD, GERD, and depression who p/w worsening of a previously diagnosed R third toe wound found to have worsening gangrene requiring amputation of R third toe.    # Right third toe DFU w/worsening gangrene (recent OM in same foot) with PVD in RLE  - Afebrile on admission. TMax in .0 F. + Leukocytosis (13.2) with neutrophilic predominance. Tylenol PRN  - ESR elevated at 70. CRP pending  - f/u blood cultures   - R foot XR pending. F/u results  - Podiatry following. OR TODAY, R third toe amputation   - Vascular surgery (Dr. Espinosa) consult f/u:  discharged with follow-up scheduled with Dr. Espinosa after angiogram revealed moderate R tibial disease  - ID  consulted. Had PICC line placed prior to discharge on last admission (discharged 11/21) receiving IV Rocephin 2 g qD and PO Flagyl 500 mg qD      # Dyspnea with fatigue, loss of appetite x3 days - improving  - Characterizes dyspnea on exertion. Denies orthopnea or PND. No prior history of CHF  - f/u TTE, CXR f/u   - Hgb 9.5 (baseline ~10.0). Monitor AM CBC. Transfuse <7.  Ative T+S    # Insulin-dependent T2DM  - Monitor fingersticks with meals and at bedtime  - lispro, lantus    # Hypertension - well controlled  - Takes Benazepril 40 mg qD at home  - C/w Lisinopril 40 mg qD for now  - Monitor BP    # Hyperlipidemia  - C/w Atorvastatin 40 mg qHS for now    # GERD  - C/w PO Protonix 40 mg qD for now    # Depression  - Was taking Sertraline 50 mg qD. Per patient and pharmacy, has not been taking medication since August  - Follow-up outpatient for resumption of medication  - Monitor for any SI/HI while inpatient    #DVT ppx: SCDs   #GI ppx: Protonix  #Diet:  NPO for OR today    FULL CODE    Dispo: acute  Handoff: f/u id, podiatry, vascular, OR today

## 2019-12-04 NOTE — DISCHARGE NOTE NURSING/CASE MANAGEMENT/SOCIAL WORK - PATIENT PORTAL LINK FT
You can access the FollowMyHealth Patient Portal offered by [facility] by registering at the following website: [email or web address]. By joining [employer]’s FollowMyHealth portal, you will also be able to view your health information using other applications (apps) compatible with our system.

## 2019-12-04 NOTE — CONSULT NOTE ADULT - ATTENDING COMMENTS
-worsening gangrene right 3rd toe  -questionable early ischemic changes to the 2nd  -Pt seen on last admission for worsening ulceration and osteomyelitis. Conservative care vs transmetatarsal amputation was discussed.   -Will take patient to the OR tomorrow for right 3rd toe amputation.  -I discussed with patient that if the 2nd digit shows signs of progressive gangrenous changes, that patient would benefit from a transmetataral amputation vs limited resection of the 3rd/2nd toe  -NPO midnight  -medical clearance appreciated   -vascular input appreciated
I have personally examined the patient and reviewed the documentation above.  Corrections and edits were made wherever needed.

## 2019-12-05 ENCOUNTER — APPOINTMENT (OUTPATIENT)
Dept: VASCULAR SURGERY | Facility: CLINIC | Age: 69
End: 2019-12-05

## 2019-12-05 LAB
ALBUMIN SERPL ELPH-MCNC: 2.9 G/DL — LOW (ref 3.5–5.2)
ALP SERPL-CCNC: 180 U/L — HIGH (ref 30–115)
ALT FLD-CCNC: 7 U/L — SIGNIFICANT CHANGE UP (ref 0–41)
ANION GAP SERPL CALC-SCNC: 12 MMOL/L — SIGNIFICANT CHANGE UP (ref 7–14)
ANION GAP SERPL CALC-SCNC: 14 MMOL/L — SIGNIFICANT CHANGE UP (ref 7–14)
AST SERPL-CCNC: 9 U/L — SIGNIFICANT CHANGE UP (ref 0–41)
BASOPHILS # BLD AUTO: 0.02 K/UL — SIGNIFICANT CHANGE UP (ref 0–0.2)
BASOPHILS NFR BLD AUTO: 0.2 % — SIGNIFICANT CHANGE UP (ref 0–1)
BILIRUB SERPL-MCNC: 0.3 MG/DL — SIGNIFICANT CHANGE UP (ref 0.2–1.2)
BLD GP AB SCN SERPL QL: SIGNIFICANT CHANGE UP
BUN SERPL-MCNC: 32 MG/DL — HIGH (ref 10–20)
BUN SERPL-MCNC: 36 MG/DL — HIGH (ref 10–20)
CALCIUM SERPL-MCNC: 8 MG/DL — LOW (ref 8.5–10.1)
CALCIUM SERPL-MCNC: 8.1 MG/DL — LOW (ref 8.5–10.1)
CHLORIDE SERPL-SCNC: 101 MMOL/L — SIGNIFICANT CHANGE UP (ref 98–110)
CHLORIDE SERPL-SCNC: 99 MMOL/L — SIGNIFICANT CHANGE UP (ref 98–110)
CO2 SERPL-SCNC: 22 MMOL/L — SIGNIFICANT CHANGE UP (ref 17–32)
CO2 SERPL-SCNC: 24 MMOL/L — SIGNIFICANT CHANGE UP (ref 17–32)
CREAT SERPL-MCNC: 1.4 MG/DL — SIGNIFICANT CHANGE UP (ref 0.7–1.5)
CREAT SERPL-MCNC: 1.7 MG/DL — HIGH (ref 0.7–1.5)
EOSINOPHIL # BLD AUTO: 0.01 K/UL — SIGNIFICANT CHANGE UP (ref 0–0.7)
EOSINOPHIL NFR BLD AUTO: 0.1 % — SIGNIFICANT CHANGE UP (ref 0–8)
GLUCOSE BLDC GLUCOMTR-MCNC: 178 MG/DL — HIGH (ref 70–99)
GLUCOSE BLDC GLUCOMTR-MCNC: 211 MG/DL — HIGH (ref 70–99)
GLUCOSE BLDC GLUCOMTR-MCNC: 294 MG/DL — HIGH (ref 70–99)
GLUCOSE BLDC GLUCOMTR-MCNC: 323 MG/DL — HIGH (ref 70–99)
GLUCOSE SERPL-MCNC: 183 MG/DL — HIGH (ref 70–99)
GLUCOSE SERPL-MCNC: 402 MG/DL — HIGH (ref 70–99)
HCT VFR BLD CALC: 29.1 % — LOW (ref 42–52)
HGB BLD-MCNC: 9.3 G/DL — LOW (ref 14–18)
IMM GRANULOCYTES NFR BLD AUTO: 0.4 % — HIGH (ref 0.1–0.3)
LYMPHOCYTES # BLD AUTO: 0.41 K/UL — LOW (ref 1.2–3.4)
LYMPHOCYTES # BLD AUTO: 3.5 % — LOW (ref 20.5–51.1)
MCHC RBC-ENTMCNC: 28.5 PG — SIGNIFICANT CHANGE UP (ref 27–31)
MCHC RBC-ENTMCNC: 32 G/DL — SIGNIFICANT CHANGE UP (ref 32–37)
MCV RBC AUTO: 89.3 FL — SIGNIFICANT CHANGE UP (ref 80–94)
MONOCYTES # BLD AUTO: 0.78 K/UL — HIGH (ref 0.1–0.6)
MONOCYTES NFR BLD AUTO: 6.7 % — SIGNIFICANT CHANGE UP (ref 1.7–9.3)
NEUTROPHILS # BLD AUTO: 10.45 K/UL — HIGH (ref 1.4–6.5)
NEUTROPHILS NFR BLD AUTO: 89.1 % — HIGH (ref 42.2–75.2)
NRBC # BLD: 0 /100 WBCS — SIGNIFICANT CHANGE UP (ref 0–0)
PLATELET # BLD AUTO: 279 K/UL — SIGNIFICANT CHANGE UP (ref 130–400)
POTASSIUM SERPL-MCNC: 4.1 MMOL/L — SIGNIFICANT CHANGE UP (ref 3.5–5)
POTASSIUM SERPL-MCNC: 5.4 MMOL/L — HIGH (ref 3.5–5)
POTASSIUM SERPL-SCNC: 4.1 MMOL/L — SIGNIFICANT CHANGE UP (ref 3.5–5)
POTASSIUM SERPL-SCNC: 5.4 MMOL/L — HIGH (ref 3.5–5)
PROT SERPL-MCNC: 5.8 G/DL — LOW (ref 6–8)
RBC # BLD: 3.26 M/UL — LOW (ref 4.7–6.1)
RBC # FLD: 13.4 % — SIGNIFICANT CHANGE UP (ref 11.5–14.5)
SODIUM SERPL-SCNC: 135 MMOL/L — SIGNIFICANT CHANGE UP (ref 135–146)
SODIUM SERPL-SCNC: 137 MMOL/L — SIGNIFICANT CHANGE UP (ref 135–146)
WBC # BLD: 11.72 K/UL — HIGH (ref 4.8–10.8)
WBC # FLD AUTO: 11.72 K/UL — HIGH (ref 4.8–10.8)

## 2019-12-05 PROCEDURE — 99233 SBSQ HOSP IP/OBS HIGH 50: CPT

## 2019-12-05 RX ORDER — SODIUM POLYSTYRENE SULFONATE 4.1 MEQ/G
15 POWDER, FOR SUSPENSION ORAL ONCE
Refills: 0 | Status: COMPLETED | OUTPATIENT
Start: 2019-12-05 | End: 2019-12-05

## 2019-12-05 RX ORDER — INSULIN LISPRO 100/ML
2 VIAL (ML) SUBCUTANEOUS ONCE
Refills: 0 | Status: COMPLETED | OUTPATIENT
Start: 2019-12-05 | End: 2019-12-05

## 2019-12-05 RX ORDER — SODIUM CHLORIDE 9 MG/ML
1000 INJECTION INTRAMUSCULAR; INTRAVENOUS; SUBCUTANEOUS
Refills: 0 | Status: DISCONTINUED | OUTPATIENT
Start: 2019-12-05 | End: 2019-12-07

## 2019-12-05 RX ADMIN — Medication 4: at 08:42

## 2019-12-05 RX ADMIN — Medication 81 MILLIGRAM(S): at 11:06

## 2019-12-05 RX ADMIN — LISINOPRIL 40 MILLIGRAM(S): 2.5 TABLET ORAL at 05:14

## 2019-12-05 RX ADMIN — SODIUM POLYSTYRENE SULFONATE 15 GRAM(S): 4.1 POWDER, FOR SUSPENSION ORAL at 17:04

## 2019-12-05 RX ADMIN — Medication 100 MILLIGRAM(S): at 05:15

## 2019-12-05 RX ADMIN — Medication 60 MILLIGRAM(S): at 05:14

## 2019-12-05 RX ADMIN — Medication 650 MILLIGRAM(S): at 15:40

## 2019-12-05 RX ADMIN — SODIUM CHLORIDE 50 MILLILITER(S): 9 INJECTION INTRAMUSCULAR; INTRAVENOUS; SUBCUTANEOUS at 13:41

## 2019-12-05 RX ADMIN — PANTOPRAZOLE SODIUM 40 MILLIGRAM(S): 20 TABLET, DELAYED RELEASE ORAL at 05:14

## 2019-12-05 RX ADMIN — Medication 3: at 11:12

## 2019-12-05 RX ADMIN — Medication 100 MILLIGRAM(S): at 13:26

## 2019-12-05 RX ADMIN — Medication 13 UNIT(S): at 11:12

## 2019-12-05 RX ADMIN — CEFTRIAXONE 100 MILLIGRAM(S): 500 INJECTION, POWDER, FOR SOLUTION INTRAMUSCULAR; INTRAVENOUS at 05:14

## 2019-12-05 RX ADMIN — Medication 2 UNIT(S): at 16:03

## 2019-12-05 RX ADMIN — Medication 13 UNIT(S): at 08:42

## 2019-12-05 RX ADMIN — Medication 13 UNIT(S): at 16:03

## 2019-12-05 RX ADMIN — Medication 650 MILLIGRAM(S): at 17:05

## 2019-12-05 RX ADMIN — Medication 100 MILLIGRAM(S): at 21:27

## 2019-12-05 RX ADMIN — ATORVASTATIN CALCIUM 40 MILLIGRAM(S): 80 TABLET, FILM COATED ORAL at 21:27

## 2019-12-05 RX ADMIN — INSULIN GLARGINE 40 UNIT(S): 100 INJECTION, SOLUTION SUBCUTANEOUS at 21:28

## 2019-12-05 RX ADMIN — Medication 2: at 16:03

## 2019-12-05 NOTE — PROGRESS NOTE ADULT - SUBJECTIVE AND OBJECTIVE BOX
SUBJECTIVE:    Patient is a 69y old Male who presents with a chief complaint of Worsening Right Diabetic Foot Ulcer (04 Dec 2019 11:46)    Currently admitted to medicine with the primary diagnosis of Diabetic foot ulcer     Today is hospital day 2d. This morning he is resting comfortably in bed    Overnight: patient spiked fever post-op , blood cultures were sent    ROS:   CONSTITUTIONAL: No weakness, fevers or chills   EYES/ENT: No visual changes; No vertigo or throat pain   NECK: No pain or stiffness   RESPIRATORY: No cough, wheezing, hemoptysis; No shortness of breath   CARDIOVASCULAR: No chest pain or palpitations   GASTROINTESTINAL: No abdominal or epigastric pain. No nausea, vomiting, or hematemesis; No diarrhea or constipation. No melena or hematochezia.  GENITOURINARY: No dysuria, frequency or hematuria  NEUROLOGICAL: No numbness or weakness  SKIN: No itching, rashes      PAST MEDICAL & SURGICAL HISTORY  GERD (gastroesophageal reflux disease)  Depression  Diabetic foot ulcer  Dyslipidemia  HTN (hypertension)  DM (diabetes mellitus): 12/27/18 hgb aic  11.2  Toe amputation status, right: (2008)    SOCIAL HISTORY:  Negative for smoking/alcohol/drug use.   ALLERGIES:  No Known Allergies    MEDICATIONS:  STANDING MEDICATIONS  aspirin enteric coated 81 milliGRAM(s) Oral daily  atorvastatin 40 milliGRAM(s) Oral at bedtime  cefTRIAXone   IVPB 2000 milliGRAM(s) IV Intermittent every 24 hours  chlorhexidine 4% Liquid 1 Application(s) Topical <User Schedule>  dextrose 5%. 1000 milliLiter(s) IV Continuous <Continuous>  dextrose 50% Injectable 12.5 Gram(s) IV Push once  dextrose 50% Injectable 25 Gram(s) IV Push once  dextrose 50% Injectable 25 Gram(s) IV Push once  influenza   Vaccine 0.5 milliLiter(s) IntraMuscular once  insulin glargine Injectable (LANTUS) 40 Unit(s) SubCutaneous at bedtime  insulin lispro (HumaLOG) corrective regimen sliding scale   SubCutaneous three times a day before meals  insulin lispro Injectable (HumaLOG) 13 Unit(s) SubCutaneous three times a day before meals  lisinopril 40 milliGRAM(s) Oral daily  metroNIDAZOLE  IVPB 500 milliGRAM(s) IV Intermittent every 8 hours  NIFEdipine XL 60 milliGRAM(s) Oral daily  pantoprazole    Tablet 40 milliGRAM(s) Oral before breakfast    PRN MEDICATIONS  acetaminophen   Tablet .. 650 milliGRAM(s) Oral every 6 hours PRN  dextrose 40% Gel 15 Gram(s) Oral once PRN  glucagon  Injectable 1 milliGRAM(s) IntraMuscular once PRN  morphine  - Injectable 2 milliGRAM(s) IV Push every 6 hours PRN  morphine  - Injectable 4 milliGRAM(s) IV Push every 6 hours PRN    VITALS:   T(F): 98.5  HR: 85  BP: 115/59  RR: 18  SpO2: 95%    LABS:                          9.3    11.72 )-----------( 279      ( 05 Dec 2019 04:53 )             29.1     12-05    135  |  99  |  32<H>  ----------------------------<  402<H>  5.4<H>   |  22  |  1.4    Ca    8.0<L>      05 Dec 2019 04:53  Mg     1.6     12-04    TPro  5.8<L>  /  Alb  2.9<L>  /  TBili  0.3  /  DBili  x   /  AST  9   /  ALT  7   /  AlkPhos  180<H>  12-05    PT/INR - ( 04 Dec 2019 05:31 )   PT: 21.10 sec;   INR: 1.85 ratio       PTT - ( 04 Dec 2019 05:31 )  PTT:34.4 sec    Culture - Blood (collected 03 Dec 2019 16:27)  Source: .Blood None  Preliminary Report (05 Dec 2019 02:01):    No growth to date.      RADIOLOGY:  < from: Xray Foot AP + Lateral + Oblique, Right (12.03.19 @ 16:54) >  Impression:  1. Since 11/15/2019, worsening third MTP septic arthritis with   osteomyelitis involving metatarsal head and proximal phalangeal base  2. Third toe tip soft tissue ulcers with tissue loss consistent with   gangrene  3. Second MTP septic arthritis superimposed on second metatarsal head   AVN, worse since prior  4. Status post partial first ray and toe resection with recurrent soft   tissue ulcer and no radiographic evidence of recurrent osteomyelitis.   Consider MRI of the forefoot as clinically warranted      < end of copied text >    PHYSICAL EXAM:  GEN: No acute distress  HEENT: normocephalic, atraumatic, aniceteric  LUNGS: Clear to auscultation bilaterally, no rales/wheezing/ rhonchi  HEART: S1/S2 present. RRR, no murmurs  ABD: Soft, non-tender, non-distended. Bowel sounds present  EXT: right foot dressing clean dry and intact  NEURO: AAOX3, normal affect      ASSESSMENT AND PLAN:    69 year-old male with PMH of insulin-dependent DM c/b diabetic foot ulcers s/p amputations of half of left great toe and entire right great toe, PVD, HTN, HLD, GERD, and depression who p/w worsening of a previously diagnosed R third toe wound found to have worsening gangrene requiring amputation of R third toe.    # Right third toe gangrene/ OM/ septic arthritis in setting of PVD in RLE  # 2nd MTP septic arthritis   - s/p right third toe ray amputation 12/4/2019 - f/u OR cultures   - Afebrile on admission. TMax in .0 F. + Leukocytosis (13.2) with neutrophilic predominance. Tylenol PRN  - ESR elevated at 70. CRP pending  - blood cultures negative x 2 , follow up repeat blood cultures s/p OR yesterday as patient spiked a fever   - R foot XR : gangrene R 3rd toe and recurrent OM, worsening R 3rd  MTP septic arthritis and OM; R 2nd MTP worsened septic arthritis    - Vascular surgery (Dr. Espinosa) consult f/u:  discharged with follow-up scheduled with Dr. Espinosa after angiogram revealed moderate R tibial disease  - Antibiotics per ID. Had PICC line placed prior to discharge on last admission (discharged 11/21) c/w  IV Rocephin 2 g qD and IV Flagyl 500 mg qD    # TYLER  - Baseline Cr 0.9-1.1 , Cr 1.4 today  - gentle hydration     # Dyspnea with fatigue, loss of appetite x3 days - resolved  - Characterizes dyspnea on exertion. Denies orthopnea or PND. No prior history of CHF  - TTE: G1DD 55-60% EF, borderline pulmonary hypertension  - CXR negative   - Hgb 9.3 (baseline ~10.0).  Transfuse <7.  Ative T+S    # Insulin-dependent T2DM  - Monitor fingersticks with meals and at bedtime  - lispro, lantus    # Hypertension - well controlled  - Takes Benazepril 40 mg qD at home  - C/w Lisinopril 40 mg qD for now  - Monitor BP    # Hyperlipidemia  - C/w Atorvastatin 40 mg qHS for now    # GERD  - C/w PO Protonix 40 mg qD for now    # Depression  - Was taking Sertraline 50 mg qD. Per patient and pharmacy, has not been taking medication since August  - Follow-up outpatient for resumption of medication  - Monitor for any SI/HI while inpatient    #DVT ppx: SCDs   #GI ppx: Protonix  #Diet: dash/tlc    FULL CODE    Dispo: acute   Handoff: f/u id, podiatry, vascular, OR today

## 2019-12-05 NOTE — PROGRESS NOTE ADULT - SUBJECTIVE AND OBJECTIVE BOX
PROGRESS NOTE   Pt seen @ bedside during AM rounds w/ Attending. S/p 3rd ray resection, right foot (DOS: 12/4/2019). Dressing +Clean, +Dry, +Intact. Pt. denies any recent n/f/v/c/sob. Pt. denies any other pedal complaints at this time.      Vital Signs Last 24 Hrs  T(C): 36.9 (05 Dec 2019 04:58), Max: 39.2 (04 Dec 2019 19:30)  T(F): 98.5 (05 Dec 2019 04:58), Max: 102.5 (04 Dec 2019 19:30)  HR: 85 (05 Dec 2019 04:58) (85 - 101)  BP: 115/59 (05 Dec 2019 04:58) (115/59 - 133/62)  BP(mean): --  RR: 18 (05 Dec 2019 04:58) (15 - 18)  SpO2: 92% (05 Dec 2019 10:00) (92% - 100%)                          9.3    11.72 )-----------( 279      ( 05 Dec 2019 04:53 )             29.1               12-05    135  |  99  |  32<H>  ----------------------------<  402<H>  5.4<H>   |  22  |  1.4    Ca    8.0<L>      05 Dec 2019 04:53  Mg     1.6     12-04    TPro  5.8<L>  /  Alb  2.9<L>  /  TBili  0.3  /  DBili  x   /  AST  9   /  ALT  7   /  AlkPhos  180<H>  12-05      PHYSICAL EXAM  Right LE Focused:  DERM: Wound base at surgical site appears about 90% necrotic and 10% fibrotic. Mild maceration noted at the plantar aspect of the foot.    Assessment:  S/p 3rd ray resection, right foot (DOS: 12/4/2019)      Plan:  Pt. seen and evaluated  Discussed treatment and condition w/ patient  Applied WTD and dressed foot w/ DSD/Kerlix  Wound Vac ordered  Possible wound vac application tomorrow, 12/6  Sx scheduled for Tuesday, 12/10 for 2nd, 4th, 5th ray Amp, w/ possible closure right foot at 7:30am  Please optimize all pre-surgical labs prior to surgery

## 2019-12-06 LAB
-  CEFTAZIDIME: SIGNIFICANT CHANGE UP
-  LEVOFLOXACIN: SIGNIFICANT CHANGE UP
-  TRIMETHOPRIM/SULFAMETHOXAZOLE: SIGNIFICANT CHANGE UP
ALBUMIN SERPL ELPH-MCNC: 2.9 G/DL — LOW (ref 3.5–5.2)
ALP SERPL-CCNC: 184 U/L — HIGH (ref 30–115)
ALT FLD-CCNC: 7 U/L — SIGNIFICANT CHANGE UP (ref 0–41)
ANION GAP SERPL CALC-SCNC: 15 MMOL/L — HIGH (ref 7–14)
AST SERPL-CCNC: 11 U/L — SIGNIFICANT CHANGE UP (ref 0–41)
BASOPHILS # BLD AUTO: 0.01 K/UL — SIGNIFICANT CHANGE UP (ref 0–0.2)
BASOPHILS NFR BLD AUTO: 0.1 % — SIGNIFICANT CHANGE UP (ref 0–1)
BILIRUB SERPL-MCNC: 0.2 MG/DL — SIGNIFICANT CHANGE UP (ref 0.2–1.2)
BUN SERPL-MCNC: 30 MG/DL — HIGH (ref 10–20)
CALCIUM SERPL-MCNC: 8.2 MG/DL — LOW (ref 8.5–10.1)
CHLORIDE SERPL-SCNC: 102 MMOL/L — SIGNIFICANT CHANGE UP (ref 98–110)
CO2 SERPL-SCNC: 22 MMOL/L — SIGNIFICANT CHANGE UP (ref 17–32)
CREAT SERPL-MCNC: 1.3 MG/DL — SIGNIFICANT CHANGE UP (ref 0.7–1.5)
EOSINOPHIL # BLD AUTO: 0.02 K/UL — SIGNIFICANT CHANGE UP (ref 0–0.7)
EOSINOPHIL NFR BLD AUTO: 0.2 % — SIGNIFICANT CHANGE UP (ref 0–8)
GLUCOSE BLDC GLUCOMTR-MCNC: 136 MG/DL — HIGH (ref 70–99)
GLUCOSE BLDC GLUCOMTR-MCNC: 157 MG/DL — HIGH (ref 70–99)
GLUCOSE BLDC GLUCOMTR-MCNC: 166 MG/DL — HIGH (ref 70–99)
GLUCOSE BLDC GLUCOMTR-MCNC: 197 MG/DL — HIGH (ref 70–99)
GLUCOSE SERPL-MCNC: 154 MG/DL — HIGH (ref 70–99)
HCT VFR BLD CALC: 28.4 % — LOW (ref 42–52)
HGB BLD-MCNC: 9.1 G/DL — LOW (ref 14–18)
IMM GRANULOCYTES NFR BLD AUTO: 0.4 % — HIGH (ref 0.1–0.3)
LYMPHOCYTES # BLD AUTO: 0.67 K/UL — LOW (ref 1.2–3.4)
LYMPHOCYTES # BLD AUTO: 7.2 % — LOW (ref 20.5–51.1)
MAGNESIUM SERPL-MCNC: 2 MG/DL — SIGNIFICANT CHANGE UP (ref 1.8–2.4)
MCHC RBC-ENTMCNC: 28 PG — SIGNIFICANT CHANGE UP (ref 27–31)
MCHC RBC-ENTMCNC: 32 G/DL — SIGNIFICANT CHANGE UP (ref 32–37)
MCV RBC AUTO: 87.4 FL — SIGNIFICANT CHANGE UP (ref 80–94)
METHOD TYPE: SIGNIFICANT CHANGE UP
MONOCYTES # BLD AUTO: 0.82 K/UL — HIGH (ref 0.1–0.6)
MONOCYTES NFR BLD AUTO: 8.8 % — SIGNIFICANT CHANGE UP (ref 1.7–9.3)
NEUTROPHILS # BLD AUTO: 7.79 K/UL — HIGH (ref 1.4–6.5)
NEUTROPHILS NFR BLD AUTO: 83.3 % — HIGH (ref 42.2–75.2)
NRBC # BLD: 0 /100 WBCS — SIGNIFICANT CHANGE UP (ref 0–0)
PLATELET # BLD AUTO: 297 K/UL — SIGNIFICANT CHANGE UP (ref 130–400)
POTASSIUM SERPL-MCNC: 3.9 MMOL/L — SIGNIFICANT CHANGE UP (ref 3.5–5)
POTASSIUM SERPL-SCNC: 3.9 MMOL/L — SIGNIFICANT CHANGE UP (ref 3.5–5)
PROT SERPL-MCNC: 5.8 G/DL — LOW (ref 6–8)
RBC # BLD: 3.25 M/UL — LOW (ref 4.7–6.1)
RBC # FLD: 13.4 % — SIGNIFICANT CHANGE UP (ref 11.5–14.5)
SODIUM SERPL-SCNC: 139 MMOL/L — SIGNIFICANT CHANGE UP (ref 135–146)
WBC # BLD: 9.35 K/UL — SIGNIFICANT CHANGE UP (ref 4.8–10.8)
WBC # FLD AUTO: 9.35 K/UL — SIGNIFICANT CHANGE UP (ref 4.8–10.8)

## 2019-12-06 PROCEDURE — 99233 SBSQ HOSP IP/OBS HIGH 50: CPT

## 2019-12-06 RX ORDER — MUPIROCIN 20 MG/G
1 OINTMENT TOPICAL
Refills: 0 | Status: DISCONTINUED | OUTPATIENT
Start: 2019-12-06 | End: 2019-12-08

## 2019-12-06 RX ORDER — CEFEPIME 1 G/1
INJECTION, POWDER, FOR SOLUTION INTRAMUSCULAR; INTRAVENOUS
Refills: 0 | Status: DISCONTINUED | OUTPATIENT
Start: 2019-12-06 | End: 2019-12-09

## 2019-12-06 RX ORDER — CEFEPIME 1 G/1
2000 INJECTION, POWDER, FOR SOLUTION INTRAMUSCULAR; INTRAVENOUS EVERY 12 HOURS
Refills: 0 | Status: DISCONTINUED | OUTPATIENT
Start: 2019-12-07 | End: 2019-12-09

## 2019-12-06 RX ORDER — CEFEPIME 1 G/1
2000 INJECTION, POWDER, FOR SOLUTION INTRAMUSCULAR; INTRAVENOUS ONCE
Refills: 0 | Status: COMPLETED | OUTPATIENT
Start: 2019-12-06 | End: 2019-12-06

## 2019-12-06 RX ORDER — ENOXAPARIN SODIUM 100 MG/ML
40 INJECTION SUBCUTANEOUS DAILY
Refills: 0 | Status: DISCONTINUED | OUTPATIENT
Start: 2019-12-06 | End: 2019-12-10

## 2019-12-06 RX ADMIN — Medication 1: at 11:56

## 2019-12-06 RX ADMIN — Medication 13 UNIT(S): at 08:28

## 2019-12-06 RX ADMIN — LISINOPRIL 40 MILLIGRAM(S): 2.5 TABLET ORAL at 05:02

## 2019-12-06 RX ADMIN — Medication 13 UNIT(S): at 17:11

## 2019-12-06 RX ADMIN — INSULIN GLARGINE 40 UNIT(S): 100 INJECTION, SOLUTION SUBCUTANEOUS at 21:18

## 2019-12-06 RX ADMIN — Medication 100 MILLIGRAM(S): at 05:01

## 2019-12-06 RX ADMIN — Medication 81 MILLIGRAM(S): at 11:57

## 2019-12-06 RX ADMIN — ENOXAPARIN SODIUM 40 MILLIGRAM(S): 100 INJECTION SUBCUTANEOUS at 11:57

## 2019-12-06 RX ADMIN — PANTOPRAZOLE SODIUM 40 MILLIGRAM(S): 20 TABLET, DELAYED RELEASE ORAL at 05:02

## 2019-12-06 RX ADMIN — Medication 60 MILLIGRAM(S): at 05:02

## 2019-12-06 RX ADMIN — Medication 100 MILLIGRAM(S): at 21:19

## 2019-12-06 RX ADMIN — Medication 13 UNIT(S): at 11:55

## 2019-12-06 RX ADMIN — Medication 100 MILLIGRAM(S): at 13:44

## 2019-12-06 RX ADMIN — ATORVASTATIN CALCIUM 40 MILLIGRAM(S): 80 TABLET, FILM COATED ORAL at 21:18

## 2019-12-06 RX ADMIN — CEFEPIME 100 MILLIGRAM(S): 1 INJECTION, POWDER, FOR SOLUTION INTRAMUSCULAR; INTRAVENOUS at 16:02

## 2019-12-06 RX ADMIN — CEFTRIAXONE 100 MILLIGRAM(S): 500 INJECTION, POWDER, FOR SOLUTION INTRAMUSCULAR; INTRAVENOUS at 05:01

## 2019-12-06 RX ADMIN — Medication 1: at 17:11

## 2019-12-06 NOTE — PROGRESS NOTE ADULT - SUBJECTIVE AND OBJECTIVE BOX
SUBJECTIVE:    Patient is a 69y old Male who presents with a chief complaint of Worsening Right Diabetic Foot Ulcer (05 Dec 2019 12:50)    Currently admitted to medicine with the primary diagnosis of Diabetic foot ulcer     Today is hospital day 3d. This morning he is resting comfortably in bed and reports no new issues or overnight events.     ROS:   CONSTITUTIONAL: No weakness, fevers or chills   EYES/ENT: No visual changes; No vertigo or throat pain   NECK: No pain or stiffness   RESPIRATORY: No cough, wheezing, hemoptysis; No shortness of breath   CARDIOVASCULAR: No chest pain or palpitations   GASTROINTESTINAL: No abdominal or epigastric pain. No nausea, vomiting, or hematemesis; No diarrhea or constipation. No melena or hematochezia.  GENITOURINARY: No dysuria, frequency or hematuria  NEUROLOGICAL: No numbness or weakness  SKIN: No itching, rashes      PAST MEDICAL & SURGICAL HISTORY  GERD (gastroesophageal reflux disease)  Depression  Diabetic foot ulcer  Dyslipidemia  HTN (hypertension)  DM (diabetes mellitus): 12/27/18 hgb aic  11.2  Toe amputation status, right: (2008)    SOCIAL HISTORY:  Negative for smoking/alcohol/drug use.   ALLERGIES:  No Known Allergies    MEDICATIONS:  STANDING MEDICATIONS  aspirin enteric coated 81 milliGRAM(s) Oral daily  atorvastatin 40 milliGRAM(s) Oral at bedtime  cefTRIAXone   IVPB 2000 milliGRAM(s) IV Intermittent every 24 hours  chlorhexidine 4% Liquid 1 Application(s) Topical <User Schedule>  dextrose 5%. 1000 milliLiter(s) IV Continuous <Continuous>  dextrose 50% Injectable 12.5 Gram(s) IV Push once  dextrose 50% Injectable 25 Gram(s) IV Push once  dextrose 50% Injectable 25 Gram(s) IV Push once  insulin glargine Injectable (LANTUS) 40 Unit(s) SubCutaneous at bedtime  insulin lispro (HumaLOG) corrective regimen sliding scale   SubCutaneous three times a day before meals  insulin lispro Injectable (HumaLOG) 13 Unit(s) SubCutaneous three times a day before meals  lisinopril 40 milliGRAM(s) Oral daily  metroNIDAZOLE  IVPB 500 milliGRAM(s) IV Intermittent every 8 hours  NIFEdipine XL 60 milliGRAM(s) Oral daily  pantoprazole    Tablet 40 milliGRAM(s) Oral before breakfast  sodium chloride 0.9%. 1000 milliLiter(s) IV Continuous <Continuous>    PRN MEDICATIONS  acetaminophen   Tablet .. 650 milliGRAM(s) Oral every 6 hours PRN  dextrose 40% Gel 15 Gram(s) Oral once PRN  glucagon  Injectable 1 milliGRAM(s) IntraMuscular once PRN  morphine  - Injectable 2 milliGRAM(s) IV Push every 6 hours PRN  morphine  - Injectable 4 milliGRAM(s) IV Push every 6 hours PRN    VITALS:   T(F): 99.1  HR: 88  BP: 117/59  RR: 18  SpO2: 92%    LABS:                          9.1    9.35  )-----------( 297      ( 06 Dec 2019 07:31 )             28.4     12-05    137  |  101  |  36<H>  ----------------------------<  183<H>  4.1   |  24  |  1.7<H>    Ca    8.1<L>      05 Dec 2019 20:46    TPro  5.8<L>  /  Alb  2.9<L>  /  TBili  0.3  /  DBili  x   /  AST  9   /  ALT  7   /  AlkPhos  180<H>  12-05              Culture - Blood (collected 04 Dec 2019 22:04)  Source: .Blood None  Preliminary Report (06 Dec 2019 02:14):    No growth to date.    Culture - Tissue with Gram Stain (collected 04 Dec 2019 21:00)  Source: .Tissue None  Gram Stain (05 Dec 2019 12:57):    Numerous Gram Negative Rods seen per oil power field    No polymorphonuclear leukocytes seen per low power field    Culture - Fungal, Other (collected 04 Dec 2019 21:00)  Source: .Other None  Preliminary Report (06 Dec 2019 06:24):    Testing in progress    Culture - Abscess with Gram Stain (collected 04 Dec 2019 14:20)  Source: .Abscess toe left  Preliminary Report (05 Dec 2019 18:10):    Numerous Stenotrophomonas maltophilia    Culture - Blood (collected 03 Dec 2019 16:27)  Source: .Blood None  Preliminary Report (05 Dec 2019 02:01):    No growth to date.          RADIOLOGY:    PHYSICAL EXAM:  GEN: No acute distress  HEENT: normocephalic, atraumatic, aniceteric  LUNGS: Clear to auscultation bilaterally, no rales/wheezing/ rhonchi  HEART: S1/S2 present. RRR, no murmurs  ABD: Soft, non-tender, non-distended. Bowel sounds present  EXT: NC/NC/NE/2+PP/[place]  NEURO: AAOX3, normal affect      ASSESSMENT AND PLAN: SUBJECTIVE:    Patient is a 69y old Male who presents with a chief complaint of Worsening Right Diabetic Foot Ulcer (05 Dec 2019 12:50)    Currently admitted to medicine with the primary diagnosis of Diabetic foot ulcer     Today is hospital day 3d. This morning he is resting comfortably in bed and reports no new issues or overnight events.     ROS:   CONSTITUTIONAL: No weakness, fevers or chills   EYES/ENT: No visual changes; No vertigo or throat pain   NECK: No pain or stiffness   RESPIRATORY: No cough, wheezing, hemoptysis; No shortness of breath   CARDIOVASCULAR: No chest pain or palpitations   GASTROINTESTINAL: No abdominal or epigastric pain. No nausea, vomiting, or hematemesis; No diarrhea or constipation. No melena or hematochezia.  GENITOURINARY: No dysuria, frequency or hematuria, patient reports urinating without a problem and denies retention   NEUROLOGICAL: No numbness or weakness  SKIN: No itching, rashes      PAST MEDICAL & SURGICAL HISTORY  GERD (gastroesophageal reflux disease)  Depression  Diabetic foot ulcer  Dyslipidemia  HTN (hypertension)  DM (diabetes mellitus): 12/27/18 hgb aic  11.2  Toe amputation status, right: (2008)    SOCIAL HISTORY:  Negative for smoking/alcohol/drug use.   ALLERGIES:  No Known Allergies    MEDICATIONS:  STANDING MEDICATIONS  aspirin enteric coated 81 milliGRAM(s) Oral daily  atorvastatin 40 milliGRAM(s) Oral at bedtime  cefTRIAXone   IVPB 2000 milliGRAM(s) IV Intermittent every 24 hours  chlorhexidine 4% Liquid 1 Application(s) Topical <User Schedule>  dextrose 5%. 1000 milliLiter(s) IV Continuous <Continuous>  dextrose 50% Injectable 12.5 Gram(s) IV Push once  dextrose 50% Injectable 25 Gram(s) IV Push once  dextrose 50% Injectable 25 Gram(s) IV Push once  insulin glargine Injectable (LANTUS) 40 Unit(s) SubCutaneous at bedtime  insulin lispro (HumaLOG) corrective regimen sliding scale   SubCutaneous three times a day before meals  insulin lispro Injectable (HumaLOG) 13 Unit(s) SubCutaneous three times a day before meals  lisinopril 40 milliGRAM(s) Oral daily  metroNIDAZOLE  IVPB 500 milliGRAM(s) IV Intermittent every 8 hours  NIFEdipine XL 60 milliGRAM(s) Oral daily  pantoprazole    Tablet 40 milliGRAM(s) Oral before breakfast  sodium chloride 0.9%. 1000 milliLiter(s) IV Continuous <Continuous>    PRN MEDICATIONS  acetaminophen   Tablet .. 650 milliGRAM(s) Oral every 6 hours PRN  dextrose 40% Gel 15 Gram(s) Oral once PRN  glucagon  Injectable 1 milliGRAM(s) IntraMuscular once PRN  morphine  - Injectable 2 milliGRAM(s) IV Push every 6 hours PRN  morphine  - Injectable 4 milliGRAM(s) IV Push every 6 hours PRN    VITALS:   T(F): 99.1  HR: 88  BP: 117/59  RR: 18  SpO2: 92%    LABS:                          9.1    9.35  )-----------( 297      ( 06 Dec 2019 07:31 )             28.4     12-05    137  |  101  |  36<H>  ----------------------------<  183<H>  4.1   |  24  |  1.7<H>    Ca    8.1<L>      05 Dec 2019 20:46    TPro  5.8<L>  /  Alb  2.9<L>  /  TBili  0.3  /  DBili  x   /  AST  9   /  ALT  7   /  AlkPhos  180<H>  12-05              Culture - Blood (collected 04 Dec 2019 22:04)  Source: .Blood None  Preliminary Report (06 Dec 2019 02:14):    No growth to date.    Culture - Tissue with Gram Stain (collected 04 Dec 2019 21:00)  Source: .Tissue None  Gram Stain (05 Dec 2019 12:57):    Numerous Gram Negative Rods seen per oil power field    No polymorphonuclear leukocytes seen per low power field    Culture - Fungal, Other (collected 04 Dec 2019 21:00)  Source: .Other None  Preliminary Report (06 Dec 2019 06:24):    Testing in progress    Culture - Abscess with Gram Stain (collected 04 Dec 2019 14:20)  Source: .Abscess toe left  Preliminary Report (05 Dec 2019 18:10):    Numerous Stenotrophomonas maltophilia    Culture - Blood (collected 03 Dec 2019 16:27)  Source: .Blood None  Preliminary Report (05 Dec 2019 02:01):    No growth to date.          RADIOLOGY:    PHYSICAL EXAM:  GEN: No acute distress  HEENT: normocephalic, atraumatic, aniceteric  LUNGS: Clear to auscultation bilaterally, no rales/wheezing/ rhonchi  HEART: S1/S2 present. RRR, no murmurs  ABD: Soft, non-tender, non-distended. Bowel sounds present  EXT: right foot dressing clean dry and intact  NEURO: AAOX3, normal affect      ASSESSMENT AND PLAN:  69 year-old male with PMH of insulin-dependent DM c/b diabetic foot ulcers s/p amputations of half of left great toe and entire right great toe, PVD, HTN, HLD, GERD, and depression who p/w worsening of a previously diagnosed R third toe wound found to have worsening gangrene requiring amputation of R third toe.    # Right third toe gangrene/ OM/ septic arthritis in setting of PVD in RLE  # 2nd MTP septic arthritis   - s/p right third toe ray amputation 12/4/2019   - OR cultures: Gram Stain: Numerous Gram Negative Rods seen per oil power field  - Afebrile on admission. TMax in .0 F. + Leukocytosis (13.2) with neutrophilic predominance. Tylenol PRN  - ESR elevated at 70. CRP pending  - blood cultures negative x 2 , follow up repeat blood cultures s/p OR yesterday as patient spiked a fever   - R foot XR : gangrene R 3rd toe and recurrent OM, worsening R 3rd  MTP septic arthritis and OM; R 2nd MTP worsened septic arthritis    - Vascular surgery (Dr. Espinosa) consult f/u:  discharged with follow-up scheduled with Dr. Espinosa after angiogram revealed moderate R tibial disease  - Antibiotics per ID. Had PICC line placed prior to discharge on last admission (discharged 11/21) c/w  IV Rocephin 2 g qD and IV Flagyl 500 mg qD    # TYLER   - Baseline Cr 0.9-1.1   - Cr trend 1.7 today <--1.4<--1.1  - gentle hydration started yesterday, patient did not improve  - r/o intra-renal cause , bladder scan f/u     # Dyspnea with fatigue, loss of appetite x3 days - resolved  - Characterizes dyspnea on exertion. Denies orthopnea or PND. No prior history of CHF  - TTE: G1DD 55-60% EF, borderline pulmonary hypertension  - CXR negative   - Hgb 9.3 (baseline ~10.0).  Transfuse <7.  Ative T+S    # Insulin-dependent T2DM  - Monitor fingersticks with meals and at bedtime  - lispro, lantus    # Hypertension - well controlled  - Takes Benazepril 40 mg qD at home  - C/w Lisinopril 40 mg qD for now  - Monitor BP    # Hyperlipidemia  - C/w Atorvastatin 40 mg qHS for now    # GERD  - C/w PO Protonix 40 mg qD for now    # Depression  - Was taking Sertraline 50 mg qD. Per patient and pharmacy, has not been taking medication since August  - Follow-up outpatient for resumption of medication  - Monitor for any SI/HI while inpatient    #DVT ppx: SCDs   #GI ppx: Protonix  #Diet: dash/tlc    FULL CODE    Handoff: podiatry OR next week for R 2nd , 4th, 5th ray amputation on 12/10 ; wound vac placement 12/6 pm SUBJECTIVE:    Patient is a 69y old Male who presents with a chief complaint of Worsening Right Diabetic Foot Ulcer (05 Dec 2019 12:50)    Currently admitted to medicine with the primary diagnosis of Diabetic foot ulcer     Today is hospital day 3d. This morning he is resting comfortably in bed and reports no new issues or overnight events.     ROS:   CONSTITUTIONAL: No weakness, fevers or chills   EYES/ENT: No visual changes; No vertigo or throat pain   NECK: No pain or stiffness   RESPIRATORY: No cough, wheezing, hemoptysis; No shortness of breath   CARDIOVASCULAR: No chest pain or palpitations   GASTROINTESTINAL: No abdominal or epigastric pain. No nausea, vomiting, or hematemesis; No diarrhea or constipation. No melena or hematochezia.  GENITOURINARY: No dysuria, frequency or hematuria, patient reports urinating without a problem and denies retention   NEUROLOGICAL: No numbness or weakness  SKIN: No itching, rashes      PAST MEDICAL & SURGICAL HISTORY  GERD (gastroesophageal reflux disease)  Depression  Diabetic foot ulcer  Dyslipidemia  HTN (hypertension)  DM (diabetes mellitus): 12/27/18 hgb aic  11.2  Toe amputation status, right: (2008)    SOCIAL HISTORY:  Negative for smoking/alcohol/drug use.   ALLERGIES:  No Known Allergies    MEDICATIONS:  STANDING MEDICATIONS  aspirin enteric coated 81 milliGRAM(s) Oral daily  atorvastatin 40 milliGRAM(s) Oral at bedtime  cefTRIAXone   IVPB 2000 milliGRAM(s) IV Intermittent every 24 hours  chlorhexidine 4% Liquid 1 Application(s) Topical <User Schedule>  dextrose 5%. 1000 milliLiter(s) IV Continuous <Continuous>  dextrose 50% Injectable 12.5 Gram(s) IV Push once  dextrose 50% Injectable 25 Gram(s) IV Push once  dextrose 50% Injectable 25 Gram(s) IV Push once  insulin glargine Injectable (LANTUS) 40 Unit(s) SubCutaneous at bedtime  insulin lispro (HumaLOG) corrective regimen sliding scale   SubCutaneous three times a day before meals  insulin lispro Injectable (HumaLOG) 13 Unit(s) SubCutaneous three times a day before meals  lisinopril 40 milliGRAM(s) Oral daily  metroNIDAZOLE  IVPB 500 milliGRAM(s) IV Intermittent every 8 hours  NIFEdipine XL 60 milliGRAM(s) Oral daily  pantoprazole    Tablet 40 milliGRAM(s) Oral before breakfast  sodium chloride 0.9%. 1000 milliLiter(s) IV Continuous <Continuous>    PRN MEDICATIONS  acetaminophen   Tablet .. 650 milliGRAM(s) Oral every 6 hours PRN  dextrose 40% Gel 15 Gram(s) Oral once PRN  glucagon  Injectable 1 milliGRAM(s) IntraMuscular once PRN  morphine  - Injectable 2 milliGRAM(s) IV Push every 6 hours PRN  morphine  - Injectable 4 milliGRAM(s) IV Push every 6 hours PRN    VITALS:   T(F): 99.1  HR: 88  BP: 117/59  RR: 18  SpO2: 92%    LABS:                          9.1    9.35  )-----------( 297      ( 06 Dec 2019 07:31 )             28.4     12-05    137  |  101  |  36<H>  ----------------------------<  183<H>  4.1   |  24  |  1.7<H>    Ca    8.1<L>      05 Dec 2019 20:46    TPro  5.8<L>  /  Alb  2.9<L>  /  TBili  0.3  /  DBili  x   /  AST  9   /  ALT  7   /  AlkPhos  180<H>  12-05              Culture - Blood (collected 04 Dec 2019 22:04)  Source: .Blood None  Preliminary Report (06 Dec 2019 02:14):    No growth to date.    Culture - Tissue with Gram Stain (collected 04 Dec 2019 21:00)  Source: .Tissue None  Gram Stain (05 Dec 2019 12:57):    Numerous Gram Negative Rods seen per oil power field    No polymorphonuclear leukocytes seen per low power field    Culture - Fungal, Other (collected 04 Dec 2019 21:00)  Source: .Other None  Preliminary Report (06 Dec 2019 06:24):    Testing in progress    Culture - Abscess with Gram Stain (collected 04 Dec 2019 14:20)  Source: .Abscess toe left  Preliminary Report (05 Dec 2019 18:10):    Numerous Stenotrophomonas maltophilia    Culture - Blood (collected 03 Dec 2019 16:27)  Source: .Blood None  Preliminary Report (05 Dec 2019 02:01):    No growth to date.          RADIOLOGY:    PHYSICAL EXAM:  GEN: No acute distress  HEENT: normocephalic, atraumatic, aniceteric  LUNGS: Clear to auscultation bilaterally, no rales/wheezing/ rhonchi  HEART: S1/S2 present. RRR, no murmurs  ABD: Soft, non-tender, non-distended. Bowel sounds present  EXT: right foot dressing clean dry and intact  NEURO: AAOX3, normal affect      ASSESSMENT AND PLAN:  69 year-old male with PMH of insulin-dependent DM c/b diabetic foot ulcers s/p amputations of half of left great toe and entire right great toe, PVD, HTN, HLD, GERD, and depression who p/w worsening of a previously diagnosed R third toe wound found to have worsening gangrene requiring amputation of R third toe.    # Right third toe gangrene/ OM/ septic arthritis in setting of PVD in RLE  # 2nd MTP septic arthritis   - s/p right third toe ray amputation 12/4/2019   - OR cultures: Gram Stain: Numerous Gram Negative Rods seen per oil power field  - Afebrile on admission. TMax in .0 F. + Leukocytosis (13.2) with neutrophilic predominance. Tylenol PRN  - ESR elevated at 70. CRP pending  - blood cultures negative x 2 , follow up repeat blood cultures s/p OR yesterday as patient spiked a fever   - R foot XR : gangrene R 3rd toe and recurrent OM, worsening R 3rd  MTP septic arthritis and OM; R 2nd MTP worsened septic arthritis    - Vascular surgery (Dr. Espinosa) consult f/u:  discharged with follow-up scheduled with Dr. Espinosa after angiogram revealed moderate R tibial disease  - Antibiotics per ID. Had PICC line placed prior to discharge on last admission (discharged 11/21) c/w  IV Rocephin 2 g qD and IV Flagyl 500 mg qD    # TYLER   - Baseline Cr 0.9-1.1   - Cr trend 1.3 today <--1.4<--1.1  - ivf since yesterday, will continue today      # Dyspnea with fatigue, loss of appetite x3 days - resolved  - Characterizes dyspnea on exertion. Denies orthopnea or PND. No prior history of CHF  - TTE: G1DD 55-60% EF, borderline pulmonary hypertension  - CXR negative   - Hgb 9.3 (baseline ~10.0).  Transfuse <7.  Ative T+S    # Insulin-dependent T2DM  - Monitor fingersticks with meals and at bedtime  - lispro, lantus    # Hypertension - well controlled  - Takes Benazepril 40 mg qD at home  - C/w Lisinopril 40 mg qD for now  - Monitor BP    # Hyperlipidemia  - C/w Atorvastatin 40 mg qHS for now    # GERD  - C/w PO Protonix 40 mg qD for now    # Depression  - Was taking Sertraline 50 mg qD. Per patient and pharmacy, has not been taking medication since August  - Follow-up outpatient for resumption of medication  - Monitor for any SI/HI while inpatient    #DVT ppx: SCDs   #GI ppx: Protonix  #Diet: dash/tlc    FULL CODE    Handoff: podiatry OR next week for R 2nd , 4th, 5th ray amputation on 12/10 ; wound vac placement 12/6 pm

## 2019-12-06 NOTE — PROGRESS NOTE ADULT - ASSESSMENT
ASSESSMENT  70 y/o Male with a PMH of DM, Previous DFU's, PVD, HTN, HLD, GERD, Depression. s/p Right Hallux Amputation, recent admission for plantar ulcer after stepping on a screw, d/c with PICC    IMPRESSION  #Gangrenous Right 3rd Digit w/ Chronic Diabetic Pressure Ulcer Plantar Aspect    3rd toe cx   Numerous Stenotrophomonas maltophilia    OR cx GNR    12/5 s/p Ray amputation of third toe of right foot   #Previous Admission; Grew MSSA; (PICC 11/14-11/21); Ceftriaxone 2g QD and Flagyl 0.5g BID  #DM    RECOMMENDATIONS  - BCX as fever overnight   - Broaden Ceftriaxone to Cefepime 2g q12h IV while awaiting GNR  - ADD levaquin 750mg daily PO for Stenotrophomonas   - Continue Flagyl IV   - has a picc line, clean

## 2019-12-06 NOTE — PROGRESS NOTE ADULT - SUBJECTIVE AND OBJECTIVE BOX
PROGRESS NOTE   Pt seen @ bedside during AM rounds, w/ Attending. S/p 3rd ray resection, right foot. Dressing +Clean, +Dry, +Intact. Pt. denies any recent n/f/v/c/sob. Pt. denies any other pedal complaints at this time.      Vital Signs Last 24 Hrs  T(C): 37.3 (06 Dec 2019 06:35), Max: 38.3 (05 Dec 2019 15:40)  T(F): 99.1 (06 Dec 2019 06:35), Max: 101 (05 Dec 2019 15:40)  HR: 88 (06 Dec 2019 04:58) (81 - 94)  BP: 117/59 (06 Dec 2019 04:58) (112/55 - 121/63)  BP(mean): --  RR: 18 (06 Dec 2019 04:58) (18 - 18)  SpO2: 92% (05 Dec 2019 10:00) (92% - 92%)                          9.1    9.35  )-----------( 297      ( 06 Dec 2019 07:31 )             28.4               12-05    137  |  101  |  36<H>  ----------------------------<  183<H>  4.1   |  24  |  1.7<H>    Ca    8.1<L>      05 Dec 2019 20:46    TPro  5.8<L>  /  Alb  2.9<L>  /  TBili  0.3  /  DBili  x   /  AST  9   /  ALT  7   /  AlkPhos  180<H>  12-05      PHYSICAL EXAM  Right LE Focused:  DERM: Increase gangrenous changes to the second toe. Wound base at surgical site appears about 90% necrotic and 10% fibrotic. Mild maceration noted at the plantar aspect of the foot.      Assesment:  S/p 3rd ray resection, right foot (DOS: 12/4/2019)        Plan:  Pt. seen and evaluated.  Discussed treatment and condition w/ patient.  Exc. dbx of fibrotic tissue w/ #15 blade down to and including subcutaneous tissue w/o incident  Applied Wet to dry w/ normal saline/DSD/Kerlix  Possible wound vac application, later today 12/6  Sx scheduled for Tuesday, 12/10 for 2nd, 4th, 5th ray Amp, w/ possible closure right foot at 7:30am  Please optimize all pre-surgical labs prior to surgery

## 2019-12-06 NOTE — PROGRESS NOTE ADULT - SUBJECTIVE AND OBJECTIVE BOX
JOSÉ MANUEL PORTER  69y, Male  Allergy: No Known Allergies      CHIEF COMPLAINT: Worsening Right Diabetic Foot Ulcer (06 Dec 2019 08:27)      INTERVAL EVENTS/HPI  - No acute events overnight, WCX GNR, stenotrophomonas  - T(F): , Max: 101 (12-05-19 @ 15:40)  - Denies any worsening symptoms  - Tolerating medication  - WBC Count: 9.35 (12-06-19 @ 07:31)  WBC Count: 11.72 (12-05-19 @ 04:53)  - Creatinine, Serum: 1.3 (12-06-19 @ 07:31)  Creatinine, Serum: 1.7 (12-05-19 @ 20:46)       ROS  General: Denies rigors, nightsweats  HEENT: Denies headache, rhinorrhea, sore throat, eye pain  CV: Denies CP, palpitations  PULM: Denies wheezing, hemoptysis  GI: Denies hematemesis, hematochezia, melena  : Denies discharge, hematuria  MSK: Denies arthralgias, myalgias  SKIN: Denies rash, lesions  NEURO: Denies paresthesias, weakness  PSYCH: Denies depression, anxiety    VITALS:  T(F): 99.1, Max: 101 (12-05-19 @ 15:40)  HR: 88  BP: 117/59  RR: 18Vital Signs Last 24 Hrs  T(C): 37.3 (06 Dec 2019 06:35), Max: 38.3 (05 Dec 2019 15:40)  T(F): 99.1 (06 Dec 2019 06:35), Max: 101 (05 Dec 2019 15:40)  HR: 88 (06 Dec 2019 04:58) (81 - 94)  BP: 117/59 (06 Dec 2019 04:58) (112/55 - 121/63)  BP(mean): --  RR: 18 (06 Dec 2019 04:58) (18 - 18)  SpO2: --    PHYSICAL EXAM:  Gen: NAD, resting in bed  HEENT: Normocephalic, atraumatic  Neck: supple, no lymphadenopathy  CV: Regular rate & regular rhythm  Lungs: decreased BS at bases  Abdomen: Soft, BS present  Ext: Warm, well perfused, R dressings  Neuro: non focal, awake  Skin: no rash, no erythema      FH: Non-contributory  Social Hx: Non-contributory    TESTS & MEASUREMENTS:                        9.1    9.35  )-----------( 297      ( 06 Dec 2019 07:31 )             28.4     12-06    139  |  102  |  30<H>  ----------------------------<  154<H>  3.9   |  22  |  1.3    Ca    8.2<L>      06 Dec 2019 07:31  Mg     2.0     12-06    TPro  5.8<L>  /  Alb  2.9<L>  /  TBili  0.2  /  DBili  x   /  AST  11  /  ALT  7   /  AlkPhos  184<H>  12-06    eGFR if Non African American: 55 mL/min/1.73M2 (12-06-19 @ 07:31)  eGFR if African American: 64 mL/min/1.73M2 (12-06-19 @ 07:31)  eGFR if Non African American: 40 mL/min/1.73M2 (12-05-19 @ 20:46)  eGFR if : 46 mL/min/1.73M2 (12-05-19 @ 20:46)    LIVER FUNCTIONS - ( 06 Dec 2019 07:31 )  Alb: 2.9 g/dL / Pro: 5.8 g/dL / ALK PHOS: 184 U/L / ALT: 7 U/L / AST: 11 U/L / GGT: x               Culture - Blood (collected 12-04-19 @ 22:04)  Source: .Blood None  Preliminary Report (12-06-19 @ 02:14):    No growth to date.    Culture - Tissue with Gram Stain (collected 12-04-19 @ 21:00)  Source: .Tissue None  Gram Stain (12-05-19 @ 12:57):    Numerous Gram Negative Rods seen per oil power field    No polymorphonuclear leukocytes seen per low power field    Culture - Fungal, Other (collected 12-04-19 @ 21:00)  Source: .Other None  Preliminary Report (12-06-19 @ 06:24):    Testing in progress    Culture - Abscess with Gram Stain (collected 12-04-19 @ 14:20)  Source: .Abscess toe left  Preliminary Report (12-05-19 @ 18:10):    Numerous Stenotrophomonas maltophilia    Culture - Blood (collected 12-03-19 @ 16:27)  Source: .Blood None  Preliminary Report (12-05-19 @ 02:01):    No growth to date.            INFECTIOUS DISEASES TESTING      RADIOLOGY & ADDITIONAL TESTS:  I have personally reviewed the last Chest xray  CXR  Xray Chest 1 View- PORTABLE-Urgent:   EXAM:  XR CHEST PORTABLE URGENT 1V            PROCEDURE DATE:  12/03/2019            INTERPRETATION:  Clinical History / Reason for exam: Preop.    Comparison : Chest radiograph 11/19/2019.    Technique/Positioning: AP portable chest radiograph, satisfactory.    Findings:    Support devices: Right-sided central PICC line terminating at the   atriocaval junction.    Cardiac/mediastinum/hilum: Unchanged.    Lung parenchyma/Pleura: No evidence of pleural effusion, pneumothorax or   focal opacity. Stable minimal thickening of the right fissure.    Skeleton/soft tissues: Unchanged    Impression:    Right-sided PICC line  No radiographic evidence of acute cardiopulmonary disease.                  GERARD DAY M.D., RESIDENT RADIOLOGIST  This document has been electronically signed.  KATHY BENITEZ M.D., ATTENDING RADIOLOGIST  This document has been electronically signed. Dec  4 2019 10:03AM             (12-03-19 @ 16:22)      CT      CARDIOLOGY TESTING  Transthoracic Echocardiogram:    EXAM:  2-D ECHO (TTE) COMPLETE        PROCEDURE DATE:  12/04/2019      INTERPRETATION:  REPORT:    TRANSTHORACIC ECHOCARDIOGRAM REPORT         Patient Name:   JOSÉ MANUEL PORTER Accession #: [id number]  Medical Rec #:  [id number]       Height:      71.5 in 181.6 cm  YOB: 1950      Weight:      216.0 lb 97.98 kg  Patient Age:    69 years       BSA:         2.19 m²  Patient Gender: M              BP:          111/58 mmHg       Date of Exam:        12/4/2019 12:23:18 PM  Referring Physician: [id number] ED UNASSIGNED  Sonographer:         Andrea Albright  Reading Physician:   Misha Saeed M.D.    Procedure: 2D Echo/Doppler/Color Doppler Complete.  Diagnosis: Shortness of breath - R06.02         Summary:   1. Left ventricular ejection fraction, by visual estimation, is 55 to   60%.   2. Spectral Doppler shows impaired relaxation pattern of left   ventricular myocardial filling (Grade I diastolic dysfunction).   3. Estimated pulmonary artery systolic pressure is 36.1 mmHg assuming a   right atrial pressure of 3 mmHg, which is consistent with borderline   pulmonary hypertension.    PHYSICIAN INTERPRETATION:  Left Ventricle: The left ventricular internal cavity size is normal. Left   ventricular wall thickness is normal. Left ventricular ejection fraction,   by visual estimation, is 55 to 60%. Spectral Doppler shows impaired   relaxation pattern of left ventricular myocardial filling (Grade I   diastolic dysfunction).  Right Ventricle: Normal right ventricular size and function.  Left Atrium: Normal left atrial size.  Right Atrium: Normal right atrial size.  Pericardium: There is no evidence of pericardial effusion.  Mitral Valve: Structurally normal mitral valve, with normal leaflet   excursion. The mitral valve is normal in structure. No evidence of mitral   valve regurgitation is seen.  Tricuspid Valve: Structurally normal tricuspid valve, with normal leaflet   excursion. The tricuspid valve is normal in structure. No tricuspid   regurgitation is visualized. Estimated pulmonary artery systolic pressure   is 36.1 mmHg assuming a right atrial pressure of 3 mmHg, which is   consistent with borderline pulmonary hypertension.  Aortic Valve: Normal trileaflet aortic valve with normal opening. The   aortic valve is normal. No evidence of aortic valve regurgitation is seen.  Pulmonic Valve: Structurally normal pulmonic valve, with normal leaflet   excursion. The pulmonic valve is normal. No indication of pulmonic valve   regurgitation.  Aorta: The aortic root and ascending aorta are structurally normal, with   no evidence of dilitation.  Pulmonary Artery: The main pulmonary artery is normal in size.       2D AND M-MODE MEASUREMENTS (normal ranges within parentheses):  Left                  Normal   Aorta/Left             Normal  Ventricle:                     Atrium:  IVSd (2D):  0.97 cm  (0.7-1.1) AoV Cusp       2.23  (1.5-2.6)  LVPWd       1.48 cm  (0.7-1.1) Separation:     cm  (2D):                          Left Atrium    4.55  (1.9-4.0)  LVIDd       3.95 cm  (3.4-5.7) (Mmode):        cm  (2D):                          LA Volume      18.8  LVIDs       2.83 cm            Index         ml/m²  (2D):                          Right  LV FS       28.3 %    (>25%)   Ventricle:  (2D):                          RVd (2D):    3.64 cm  IVSd        0.86 cm  (0.7-1.1)  (Mmode):  LVPWd       0.99 cm  (0.7-1.1)  (Mmode):  LVIDd       4.96 cm  (3.4-5.7)  (Mmode):  LVIDs       2.99 cm  (Mmode):  LV FS       39.7 %    (>25%)  (Mmode):  Relative     0.75     (<0.42)  Wall  Thickness  Rel. Wall    0.40     (<0.42)  Thickness  Mm  LV Mass    73.7 g/m²  Index:  Mmode    SPECTRAL DOPPLER ANALYSIS:  LV DIASTOLIC FUNCTION:  MV Peak E: 1.05 m/s Decel Time: 163 msec  MV Peak A: 0.94 m/s  E/A Ratio: 1.11    Aortic Valve:  AoV VMax:   1.32 m/s AoV Area, Vmax: 2.92 cm² Vmax Indx: 1.33 cm²/m²  AoV Pk Grad: 7.0 mmHg    LVOT Vmax: 1.09 m/s  LVOT VTI:  0.25 m  LVOT Diam: 2.13 cm    Mitral Valve:  MV P1/2 Time: 47.32 msec  MV Area, PHT: 4.65 cm²    Tricuspid Valve and PA/RV Systolic Pressure: TR Max Velocity: 2.88 m/s RA   Pressure: 3 mmHg RVSP/PASP: 36.1 mmHg    Pulmonic Valve:  PV Max Velocity: 0.90 m/s PV Max PG: 3.3 mmHg PV Mean PG:       [id number] Misha Saeed M.D., Electronically signed on 12/4/2019 at 2:35:33   PM              *** Final ***                    MISHA SAEED MD  This document has been electronically signed. Dec  4 2019 12:23PM             (12-04-19 @ 12:23)  12 Lead ECG:   Ventricular Rate 87 BPM    Atrial Rate 87 BPM    P-R Interval 132 ms    QRS Duration 66 ms    Q-T Interval 372 ms    QTC Calculation(Bezet) 447 ms    P Axis 64 degrees    R Axis 24 degrees    T Axis 51 degrees    Diagnosis Line Normal sinus rhythm  Nonspecific T wave abnormality  Abnormal ECG    Confirmed by DAVE COWAN MD ([id number]) on 12/3/2019 3:27:36 PM (12-03-19 @ 11:55)      MEDICATIONS  aspirin enteric coated 81  atorvastatin 40  cefTRIAXone   IVPB 2000  chlorhexidine 4% Liquid 1  dextrose 5%. 1000  dextrose 50% Injectable 12.5  dextrose 50% Injectable 25  dextrose 50% Injectable 25  insulin glargine Injectable (LANTUS) 40  insulin lispro (HumaLOG) corrective regimen sliding scale   insulin lispro Injectable (HumaLOG) 13  lisinopril 40  metroNIDAZOLE  IVPB 500  mupirocin 2% Nasal 1  NIFEdipine XL 60  pantoprazole    Tablet 40  sodium chloride 0.9%. 1000      ANTIBIOTICS:  cefTRIAXone   IVPB 2000 milliGRAM(s) IV Intermittent every 24 hours  metroNIDAZOLE  IVPB 500 milliGRAM(s) IV Intermittent every 8 hours      All available historical records have been reviewed

## 2019-12-07 LAB
-  AMIKACIN: SIGNIFICANT CHANGE UP
-  AZTREONAM: SIGNIFICANT CHANGE UP
-  CEFEPIME: SIGNIFICANT CHANGE UP
-  CEFTAZIDIME: SIGNIFICANT CHANGE UP
-  CIPROFLOXACIN: SIGNIFICANT CHANGE UP
-  GENTAMICIN: SIGNIFICANT CHANGE UP
-  IMIPENEM: SIGNIFICANT CHANGE UP
-  LEVOFLOXACIN: SIGNIFICANT CHANGE UP
-  MEROPENEM: SIGNIFICANT CHANGE UP
-  PIPERACILLIN/TAZOBACTAM: SIGNIFICANT CHANGE UP
-  TOBRAMYCIN: SIGNIFICANT CHANGE UP
ALBUMIN SERPL ELPH-MCNC: 2.7 G/DL — LOW (ref 3.5–5.2)
ALP SERPL-CCNC: 184 U/L — HIGH (ref 30–115)
ALT FLD-CCNC: 6 U/L — SIGNIFICANT CHANGE UP (ref 0–41)
ANION GAP SERPL CALC-SCNC: 11 MMOL/L — SIGNIFICANT CHANGE UP (ref 7–14)
AST SERPL-CCNC: 12 U/L — SIGNIFICANT CHANGE UP (ref 0–41)
BASOPHILS # BLD AUTO: 0.02 K/UL — SIGNIFICANT CHANGE UP (ref 0–0.2)
BASOPHILS NFR BLD AUTO: 0.2 % — SIGNIFICANT CHANGE UP (ref 0–1)
BILIRUB SERPL-MCNC: 0.2 MG/DL — SIGNIFICANT CHANGE UP (ref 0.2–1.2)
BUN SERPL-MCNC: 23 MG/DL — HIGH (ref 10–20)
CALCIUM SERPL-MCNC: 7.9 MG/DL — LOW (ref 8.5–10.1)
CHLORIDE SERPL-SCNC: 104 MMOL/L — SIGNIFICANT CHANGE UP (ref 98–110)
CO2 SERPL-SCNC: 25 MMOL/L — SIGNIFICANT CHANGE UP (ref 17–32)
CREAT SERPL-MCNC: 1 MG/DL — SIGNIFICANT CHANGE UP (ref 0.7–1.5)
CULTURE RESULTS: SIGNIFICANT CHANGE UP
EOSINOPHIL # BLD AUTO: 0.04 K/UL — SIGNIFICANT CHANGE UP (ref 0–0.7)
EOSINOPHIL NFR BLD AUTO: 0.5 % — SIGNIFICANT CHANGE UP (ref 0–8)
GLUCOSE BLDC GLUCOMTR-MCNC: 122 MG/DL — HIGH (ref 70–99)
GLUCOSE BLDC GLUCOMTR-MCNC: 143 MG/DL — HIGH (ref 70–99)
GLUCOSE BLDC GLUCOMTR-MCNC: 147 MG/DL — HIGH (ref 70–99)
GLUCOSE BLDC GLUCOMTR-MCNC: 84 MG/DL — SIGNIFICANT CHANGE UP (ref 70–99)
GLUCOSE SERPL-MCNC: 123 MG/DL — HIGH (ref 70–99)
HCT VFR BLD CALC: 25.9 % — LOW (ref 42–52)
HGB BLD-MCNC: 8.2 G/DL — LOW (ref 14–18)
IMM GRANULOCYTES NFR BLD AUTO: 0.6 % — HIGH (ref 0.1–0.3)
LYMPHOCYTES # BLD AUTO: 0.67 K/UL — LOW (ref 1.2–3.4)
LYMPHOCYTES # BLD AUTO: 7.9 % — LOW (ref 20.5–51.1)
MCHC RBC-ENTMCNC: 28 PG — SIGNIFICANT CHANGE UP (ref 27–31)
MCHC RBC-ENTMCNC: 31.7 G/DL — LOW (ref 32–37)
MCV RBC AUTO: 88.4 FL — SIGNIFICANT CHANGE UP (ref 80–94)
METHOD TYPE: SIGNIFICANT CHANGE UP
MONOCYTES # BLD AUTO: 0.86 K/UL — HIGH (ref 0.1–0.6)
MONOCYTES NFR BLD AUTO: 10.1 % — HIGH (ref 1.7–9.3)
NEUTROPHILS # BLD AUTO: 6.88 K/UL — HIGH (ref 1.4–6.5)
NEUTROPHILS NFR BLD AUTO: 80.7 % — HIGH (ref 42.2–75.2)
NRBC # BLD: 0 /100 WBCS — SIGNIFICANT CHANGE UP (ref 0–0)
ORGANISM # SPEC MICROSCOPIC CNT: SIGNIFICANT CHANGE UP
PLATELET # BLD AUTO: 274 K/UL — SIGNIFICANT CHANGE UP (ref 130–400)
POTASSIUM SERPL-MCNC: 4.1 MMOL/L — SIGNIFICANT CHANGE UP (ref 3.5–5)
POTASSIUM SERPL-SCNC: 4.1 MMOL/L — SIGNIFICANT CHANGE UP (ref 3.5–5)
PROT SERPL-MCNC: 5.7 G/DL — LOW (ref 6–8)
RBC # BLD: 2.93 M/UL — LOW (ref 4.7–6.1)
RBC # FLD: 13.4 % — SIGNIFICANT CHANGE UP (ref 11.5–14.5)
SODIUM SERPL-SCNC: 140 MMOL/L — SIGNIFICANT CHANGE UP (ref 135–146)
SPECIMEN SOURCE: SIGNIFICANT CHANGE UP
WBC # BLD: 8.52 K/UL — SIGNIFICANT CHANGE UP (ref 4.8–10.8)
WBC # FLD AUTO: 8.52 K/UL — SIGNIFICANT CHANGE UP (ref 4.8–10.8)

## 2019-12-07 PROCEDURE — 99233 SBSQ HOSP IP/OBS HIGH 50: CPT

## 2019-12-07 RX ADMIN — Medication 100 MILLIGRAM(S): at 21:36

## 2019-12-07 RX ADMIN — Medication 13 UNIT(S): at 17:08

## 2019-12-07 RX ADMIN — ENOXAPARIN SODIUM 40 MILLIGRAM(S): 100 INJECTION SUBCUTANEOUS at 11:20

## 2019-12-07 RX ADMIN — Medication 100 MILLIGRAM(S): at 15:12

## 2019-12-07 RX ADMIN — CHLORHEXIDINE GLUCONATE 1 APPLICATION(S): 213 SOLUTION TOPICAL at 05:23

## 2019-12-07 RX ADMIN — CEFEPIME 100 MILLIGRAM(S): 1 INJECTION, POWDER, FOR SOLUTION INTRAMUSCULAR; INTRAVENOUS at 05:25

## 2019-12-07 RX ADMIN — Medication 100 MILLIGRAM(S): at 05:30

## 2019-12-07 RX ADMIN — CEFEPIME 100 MILLIGRAM(S): 1 INJECTION, POWDER, FOR SOLUTION INTRAMUSCULAR; INTRAVENOUS at 17:07

## 2019-12-07 RX ADMIN — PANTOPRAZOLE SODIUM 40 MILLIGRAM(S): 20 TABLET, DELAYED RELEASE ORAL at 05:24

## 2019-12-07 RX ADMIN — Medication 81 MILLIGRAM(S): at 11:20

## 2019-12-07 RX ADMIN — ATORVASTATIN CALCIUM 40 MILLIGRAM(S): 80 TABLET, FILM COATED ORAL at 21:36

## 2019-12-07 RX ADMIN — Medication 60 MILLIGRAM(S): at 05:24

## 2019-12-07 RX ADMIN — Medication 13 UNIT(S): at 08:30

## 2019-12-07 RX ADMIN — Medication 13 UNIT(S): at 12:23

## 2019-12-07 RX ADMIN — LISINOPRIL 40 MILLIGRAM(S): 2.5 TABLET ORAL at 05:24

## 2019-12-07 NOTE — PROGRESS NOTE ADULT - SUBJECTIVE AND OBJECTIVE BOX
PODIATRY PROGRESS NOTE   [id number] JOSÉ MANUEL PORTER is a pleasant well-nourished, well developed 69y Male in no acute distress, alert awake, and oriented to person, place and time.   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 Patient was recently admitted to [facility] from 11/14-11/21 for worsening of the same diabetic foot ulcer of right foot with OM with cultures growing MSSA; patient had PICC line placed prior to discharge and was receiving PO Flagyl 500 mg BID and IV Rocephin 2 g qD with scheduled ID (Dr. Gary Turpin) follow-up.    Today, patient presents with mild pain and numbness in right third toe. Per patient and wife at bedside, he had appointment with Vascular Surgery scheduled for today but decided to come to the ED due to worsening appearance of toe (was starting to appear darker) in addition to feeling short of breath, fatigue, and loss of appetite for past three days. + Pain in R foot, + loss of appetite, + fatigue, + shortness of breath x3 days. Denies fever/chills/HA/change in vision/rhinorrhea/sore throat/cough/chest pain/abdominal pain/constipation/diarrhea. (03 Dec 2019 13:34)    pt seen and assessed am rounds at bedside.  pt dressing clean dry intact but has not been changed since yesterday as per patient.      Vital Signs Last 24 Hrs  T(C): 37.1 (07 Dec 2019 05:41), Max: 37.1 (07 Dec 2019 05:41)  T(F): 98.7 (07 Dec 2019 05:41), Max: 98.7 (07 Dec 2019 05:41)  HR: 81 (07 Dec 2019 05:41) (81 - 85)  BP: 126/58 (07 Dec 2019 05:41) (108/56 - 126/58)  BP(mean): --  RR: 18 (07 Dec 2019 05:41) (18 - 18)  SpO2: --                        8.2    8.52  )-----------( 274      ( 07 Dec 2019 04:30 )             25.9                 12-07    140  |  104  |  23<H>  ----------------------------<  123<H>  4.1   |  25  |  1.0    Ca    7.9<L>      07 Dec 2019 04:30  Mg     2.0     12-06    TPro  5.7<L>  /  Alb  2.7<L>  /  TBili  0.2  /  DBili  x   /  AST  12  /  ALT  6   /  AlkPhos  184<H>  12-07    < from: Xray Foot AP + Lateral + Oblique, Right (12.04.19 @ 17:01) >    EXAM:  XR FOOT COMP MIN 3 VIEWS RT            PROCEDURE DATE:  12/04/2019            INTERPRETATION:  CLINICAL HISTORY: Postoperative.    COMPARISON: 12/3/2019.    TECHNIQUE: 3 views of the right foot.      FINDINGS:    Interval amputation of the third digit to the distal metatarsal shaft. No   radiographic evidence for residual osteomyelitis.    Unchanged lucencies/erosions throughout the second metatarsal head and   base of the second proximal phalanx. Stable amputation of the first digit   to the distal metatarsal. Degenerative change throughout the hindfoot and   midfoot, stable.    IMPRESSION:  1. Post amputation of the third digit; no radiographic evidence for   residual osteomyelitis in the third digit.  2. Unchanged lucencies/erosions at the second MTP joint, worrisome for   septic arthritis.    MARIA A PRECIADO M.D., ATTENDING RADIOLOGIST  This document has been electronically signed. Dec  5 2019  8:34AM       < end of copied text >      Culture - Tissue with Gram Stain (12.04.19 @ 21:00)    -  Aztreonam: R >16    -  Amoxicillin/Clavulanic Acid: R >16/8    -  Ampicillin/Sulbactam: R >16/8 Enterobacter, Citrobacter, and Serratia may develop resistance during prolonged therapy (3-4 days)    Gram Stain:   Numerous Gram Negative Rods seen per oil power field  No polymorphonuclear leukocytes seen per low power field    -  Ampicillin: R >16 These ampicillin results predict results for amoxicillin    -  Amikacin: S <=16    -  Ciprofloxacin: S <=1    -  Ceftriaxone: R >32 Enterobacter, Citrobacter, and Serratia may develop resistance during prolonged therapy    -  Ertapenem: I 1    -  Gentamicin: S <=2    -  Cefepime: R >16    -  Cefazolin: R >16 Enterobacter, Citrobacter, and Serratia may develop resistance during prolonged therapy (3-4 days)    -  Cefoxitin: R >16    -  Tobramycin: S <=2    -  Meropenem: S <=1    -  Levofloxacin: S <=2    -  Imipenem: S <=1    -  Trimethoprim/Sulfamethoxazole: S <=2/38    -  Piperacillin/Tazobactam: R >64    Specimen Source: .Tissue None    Culture Results:   Numerous Enterobacter cloacae complex    Organism Identification: Enterobacter cloacae complex    Organism: Enterobacter cloacae complex    Method Type: LINDSEY    Culture - Fungal, Other (12.04.19 @ 21:00)    Specimen Source: .Other None    Culture Results:   Testing in progress        PHYSICAL EXAM  Right LE Focused:  DERM: Increase gangrenous changes to the second toe. Wound base at surgical site appears about 90% necrotic and 10% fibrotic.   Open plantar wound base fibrogranular tracking dorsally  Musk: hx of amputation left hallux      Assesment:  S/p 3rd ray resection, right foot (DOS: 12/4/2019)  DM  PVD      Plan:  Pt. seen and evaluated.  Discussed treatment and condition w/ patient.  wound flushed with NS, applied wound vac at 75mmhg cont  post op xray as above  f/u surgical wound culture/bone/pathology    cont IV abx as per ID  consult vascular   Sx scheduled for Tuesday, 12/10 for 2nd, 4th, 5th ray Amp, w/ possible closure right foot at 7:30am  Please optimize all pre-surgical labs prior to surgery  f/u with attending  12-07-19 @ 09:28

## 2019-12-07 NOTE — PROGRESS NOTE ADULT - SUBJECTIVE AND OBJECTIVE BOX
SUBJECTIVE:    Patient is a 69y old Male who presents with a chief complaint of Worsening Right Diabetic Foot Ulcer (06 Dec 2019 10:14)    Currently admitted to medicine with the primary diagnosis of Diabetic foot ulcer     Today is hospital day 4d. This morning he is resting comfortably in bed and reports no new issues or overnight events.     ROS:   CONSTITUTIONAL: No weakness, fevers or chills   EYES/ENT: No visual changes; No vertigo or throat pain   NECK: No pain or stiffness   RESPIRATORY: No cough, wheezing, hemoptysis; No shortness of breath   CARDIOVASCULAR: No chest pain or palpitations   GASTROINTESTINAL: No abdominal or epigastric pain. No nausea, vomiting, or hematemesis; No diarrhea or constipation. No melena or hematochezia.  GENITOURINARY: No dysuria, frequency or hematuria  NEUROLOGICAL: No numbness or weakness  SKIN: No itching, rashes      PAST MEDICAL & SURGICAL HISTORY  GERD (gastroesophageal reflux disease)  Depression  Diabetic foot ulcer  Dyslipidemia  HTN (hypertension)  DM (diabetes mellitus): 12/27/18 hgb aic  11.2  Toe amputation status, right: (2008)    SOCIAL HISTORY:  Negative for smoking/alcohol/drug use.     ALLERGIES:  No Known Allergies    MEDICATIONS:  STANDING MEDICATIONS  aspirin enteric coated 81 milliGRAM(s) Oral daily  atorvastatin 40 milliGRAM(s) Oral at bedtime  cefepime   IVPB      cefepime   IVPB 2000 milliGRAM(s) IV Intermittent every 12 hours  chlorhexidine 4% Liquid 1 Application(s) Topical <User Schedule>  dextrose 5%. 1000 milliLiter(s) IV Continuous <Continuous>  dextrose 50% Injectable 12.5 Gram(s) IV Push once  dextrose 50% Injectable 25 Gram(s) IV Push once  dextrose 50% Injectable 25 Gram(s) IV Push once  enoxaparin Injectable 40 milliGRAM(s) SubCutaneous daily  insulin glargine Injectable (LANTUS) 40 Unit(s) SubCutaneous at bedtime  insulin lispro (HumaLOG) corrective regimen sliding scale   SubCutaneous three times a day before meals  insulin lispro Injectable (HumaLOG) 13 Unit(s) SubCutaneous three times a day before meals  levoFLOXacin IVPB      levoFLOXacin IVPB 750 milliGRAM(s) IV Intermittent every 24 hours  lisinopril 40 milliGRAM(s) Oral daily  metroNIDAZOLE  IVPB 500 milliGRAM(s) IV Intermittent every 8 hours  mupirocin 2% Nasal 1 Application(s) Nasal two times a day  NIFEdipine XL 60 milliGRAM(s) Oral daily  pantoprazole    Tablet 40 milliGRAM(s) Oral before breakfast  sodium chloride 0.9%. 1000 milliLiter(s) IV Continuous <Continuous>    PRN MEDICATIONS  acetaminophen   Tablet .. 650 milliGRAM(s) Oral every 6 hours PRN  dextrose 40% Gel 15 Gram(s) Oral once PRN  glucagon  Injectable 1 milliGRAM(s) IntraMuscular once PRN  morphine  - Injectable 2 milliGRAM(s) IV Push every 6 hours PRN  morphine  - Injectable 4 milliGRAM(s) IV Push every 6 hours PRN    VITALS:   T(F): 98.7  HR: 81  BP: 126/58  RR: 18  SpO2: --    LABS:                        8.2    8.52  )-----------( 274      ( 07 Dec 2019 04:30 )             25.9     12-06    139  |  102  |  30<H>  ----------------------------<  154<H>  3.9   |  22  |  1.3    Ca    8.2<L>      06 Dec 2019 07:31  Mg     2.0     12-06    TPro  5.8<L>  /  Alb  2.9<L>  /  TBili  0.2  /  DBili  x   /  AST  11  /  ALT  7   /  AlkPhos  184<H>  12-06    Culture - Blood (collected 04 Dec 2019 22:04)  Source: .Blood None  Preliminary Report (06 Dec 2019 02:14):    No growth to date.    Culture - Tissue with Gram Stain (collected 04 Dec 2019 21:00)  Source: .Tissue None  Gram Stain (05 Dec 2019 12:57):    Numerous Gram Negative Rods seen per oil power field    No polymorphonuclear leukocytes seen per low power field  Preliminary Report (06 Dec 2019 12:11):    Numerous Enterobacter cloacae complex    Culture - Fungal, Other (collected 04 Dec 2019 21:00)  Source: .Other None  Preliminary Report (06 Dec 2019 06:24):    Testing in progress    Culture - Surgical Swab (collected 04 Dec 2019 21:00)  Source: .Surgical Swab None  Preliminary Report (06 Dec 2019 14:51):    Numerous Enterobacter cloacae complex    Numerous Stenotrophomonas maltophilia    Few Corynebacterium species    Culture - Abscess with Gram Stain (collected 04 Dec 2019 14:20)  Source: .Abscess toe left  Preliminary Report (06 Dec 2019 20:54):    Culture yields >4 types of aerobic and/or anaerobic bacteria    Call client services within 7 days if further workup is clinically    indicated.    Culture includes    Numerous Stenotrophomonas maltophilia    Numerous Pseudomonas aeruginosa  Organism: Stenotrophomonas maltophilia (06 Dec 2019 20:50)  Organism: Stenotrophomonas maltophilia (06 Dec 2019 20:50)    RADIOLOGY:    PHYSICAL EXAM:    GEN: No acute distress  HEENT: normocephalic, atraumatic, aniceteric  LUNGS: Clear to auscultation bilaterally, no rales/wheezing/ rhonchi  HEART: S1/S2 present. RRR, no murmurs  ABD: Soft, non-tender, non-distended. Bowel sounds present  EXT: right foot dressing clean dry and intact  NEURO: AAOX3, normal affect    ASSESSMENT AND PLAN:    69 year-old male with PMH of insulin-dependent DM c/b diabetic foot ulcers s/p amputations of half of left great toe and entire right great toe, PVD, HTN, HLD, GERD, and depression who p/w worsening of a previously diagnosed R third toe wound found to have worsening gangrene requiring amputation of R third toe.    # Right third toe gangrene/ OM/ septic arthritis in setting of PVD in RLE  # 2nd MTP septic arthritis   - s/p right third toe ray amputation 12/4/2019   - OR cultures: Gram Stain: Numerous Gram Negative Rods seen per oil power field  - Afebrile on admission. TMax in .0 F. + Leukocytosis (13.2) with neutrophilic predominance. Tylenol PRN  - ESR elevated at 70. CRP pending  - blood cultures negative x 2 , follow up repeat blood cultures s/p OR yesterday as patient spiked a fever   - R foot XR : gangrene R 3rd toe and recurrent OM, worsening R 3rd  MTP septic arthritis and OM; R 2nd MTP worsened septic arthritis    -Vascular surgery (Dr. Espinosa) consult f/u:  discharged with follow-up scheduled with Dr. Espinosa after angiogram revealed moderate R tibial disease  - Antibiotics per ID. Had PICC line placed prior to discharge on last admission (discharged 11/21) : Cefepime 2 g IV q12h, Levofloxacin 750 mg IV q24h, Flagyl 500 mg IV q8h    # TYLER -resolved  - Baseline Cr 0.9-1.1   - Cr trend 1.3 today <--1.4<--1.1  - ivf since yesterday, will continue today    # Dyspnea with fatigue, loss of appetite x3 days - resolved  - Characterizes dyspnea on exertion. Denies orthopnea or PND. No prior history of CHF  - TTE: G1DD 55-60% EF, borderline pulmonary hypertension  - CXR negative   - Hgb 9.3 (baseline ~10.0).  Transfuse <7.  Ative T+S    # Insulin-dependent T2DM  - Monitor fingersticks with meals and at bedtime  - lispro, lantus    # Hypertension - well controlled  - Takes Benazepril 40 mg qD at home  - C/w Lisinopril 40 mg qD for now  - Monitor BP    # Hyperlipidemia  - C/w Atorvastatin 40 mg qHS for now    # GERD  - C/w PO Protonix 40 mg qD for now  # Depression  - Was taking Sertraline 50 mg qD. Per patient and pharmacy, has not been taking medication since August  - Follow-up outpatient for resumption of medication  - Monitor for any SI/HI while inpatient    #DVT ppx: SCDs   #GI ppx: Protonix  #Diet: dash/tlc    FULL CODE    Handoff: podiatry OR next week for R 2nd , 4th, 5th ray amputation on 12/10 ; wound vac placement was planned for  12/6 SUBJECTIVE:    Patient is a 69y old Male who presents with a chief complaint of Worsening Right Diabetic Foot Ulcer (06 Dec 2019 10:14)    Currently admitted to medicine with the primary diagnosis of Diabetic foot ulcer     Today is hospital day 4d. This morning he is resting comfortably in bed and reports no new issues or overnight events.     ROS:   CONSTITUTIONAL: No weakness, fevers or chills   EYES/ENT: No visual changes; No vertigo or throat pain   NECK: No pain or stiffness   RESPIRATORY: No cough, wheezing, hemoptysis; No shortness of breath   CARDIOVASCULAR: No chest pain or palpitations   GASTROINTESTINAL: No abdominal or epigastric pain. No nausea, vomiting, or hematemesis; No diarrhea or constipation. No melena or hematochezia.  GENITOURINARY: No dysuria, frequency or hematuria  NEUROLOGICAL: No numbness or weakness  SKIN: No itching, rashes      PAST MEDICAL & SURGICAL HISTORY  GERD (gastroesophageal reflux disease)  Depression  Diabetic foot ulcer  Dyslipidemia  HTN (hypertension)  DM (diabetes mellitus): 12/27/18 hgb aic  11.2  Toe amputation status, right: (2008)    SOCIAL HISTORY:  Negative for smoking/alcohol/drug use.     ALLERGIES:  No Known Allergies    MEDICATIONS:  STANDING MEDICATIONS  aspirin enteric coated 81 milliGRAM(s) Oral daily  atorvastatin 40 milliGRAM(s) Oral at bedtime  cefepime   IVPB      cefepime   IVPB 2000 milliGRAM(s) IV Intermittent every 12 hours  chlorhexidine 4% Liquid 1 Application(s) Topical <User Schedule>  dextrose 5%. 1000 milliLiter(s) IV Continuous <Continuous>  dextrose 50% Injectable 12.5 Gram(s) IV Push once  dextrose 50% Injectable 25 Gram(s) IV Push once  dextrose 50% Injectable 25 Gram(s) IV Push once  enoxaparin Injectable 40 milliGRAM(s) SubCutaneous daily  insulin glargine Injectable (LANTUS) 40 Unit(s) SubCutaneous at bedtime  insulin lispro (HumaLOG) corrective regimen sliding scale   SubCutaneous three times a day before meals  insulin lispro Injectable (HumaLOG) 13 Unit(s) SubCutaneous three times a day before meals  levoFLOXacin IVPB      levoFLOXacin IVPB 750 milliGRAM(s) IV Intermittent every 24 hours  lisinopril 40 milliGRAM(s) Oral daily  metroNIDAZOLE  IVPB 500 milliGRAM(s) IV Intermittent every 8 hours  mupirocin 2% Nasal 1 Application(s) Nasal two times a day  NIFEdipine XL 60 milliGRAM(s) Oral daily  pantoprazole    Tablet 40 milliGRAM(s) Oral before breakfast  sodium chloride 0.9%. 1000 milliLiter(s) IV Continuous <Continuous>    PRN MEDICATIONS  acetaminophen   Tablet .. 650 milliGRAM(s) Oral every 6 hours PRN  dextrose 40% Gel 15 Gram(s) Oral once PRN  glucagon  Injectable 1 milliGRAM(s) IntraMuscular once PRN  morphine  - Injectable 2 milliGRAM(s) IV Push every 6 hours PRN  morphine  - Injectable 4 milliGRAM(s) IV Push every 6 hours PRN    VITALS:   T(F): 98.7  HR: 81  BP: 126/58  RR: 18  SpO2: --    LABS:                        8.2    8.52  )-----------( 274      ( 07 Dec 2019 04:30 )             25.9     12-06    139  |  102  |  30<H>  ----------------------------<  154<H>  3.9   |  22  |  1.3    Ca    8.2<L>      06 Dec 2019 07:31  Mg     2.0     12-06    TPro  5.8<L>  /  Alb  2.9<L>  /  TBili  0.2  /  DBili  x   /  AST  11  /  ALT  7   /  AlkPhos  184<H>  12-06    Culture - Blood (collected 04 Dec 2019 22:04)  Source: .Blood None  Preliminary Report (06 Dec 2019 02:14):    No growth to date.    Culture - Tissue with Gram Stain (collected 04 Dec 2019 21:00)  Source: .Tissue None  Gram Stain (05 Dec 2019 12:57):    Numerous Gram Negative Rods seen per oil power field    No polymorphonuclear leukocytes seen per low power field  Preliminary Report (06 Dec 2019 12:11):    Numerous Enterobacter cloacae complex    Culture - Fungal, Other (collected 04 Dec 2019 21:00)  Source: .Other None  Preliminary Report (06 Dec 2019 06:24):    Testing in progress    Culture - Surgical Swab (collected 04 Dec 2019 21:00)  Source: .Surgical Swab None  Preliminary Report (06 Dec 2019 14:51):    Numerous Enterobacter cloacae complex    Numerous Stenotrophomonas maltophilia    Few Corynebacterium species    Culture - Abscess with Gram Stain (collected 04 Dec 2019 14:20)  Source: .Abscess toe left  Preliminary Report (06 Dec 2019 20:54):    Culture yields >4 types of aerobic and/or anaerobic bacteria    Call client services within 7 days if further workup is clinically    indicated.    Culture includes    Numerous Stenotrophomonas maltophilia    Numerous Pseudomonas aeruginosa  Organism: Stenotrophomonas maltophilia (06 Dec 2019 20:50)  Organism: Stenotrophomonas maltophilia (06 Dec 2019 20:50)    RADIOLOGY:    PHYSICAL EXAM:    GEN: No acute distress  HEENT: normocephalic, atraumatic, aniceteric  LUNGS: Clear to auscultation bilaterally, no rales/wheezing/ rhonchi  HEART: S1/S2 present. RRR, no murmurs  ABD: Soft, non-tender, non-distended. Bowel sounds present  EXT: right foot dressing clean dry and intact  NEURO: AAOX3, normal affect    ASSESSMENT AND PLAN:    69 year-old male with PMH of insulin-dependent DM c/b diabetic foot ulcers s/p amputations of half of left great toe and entire right great toe, PVD, HTN, HLD, GERD, and depression who p/w worsening of a previously diagnosed R third toe wound found to have worsening gangrene requiring amputation of R third toe.    # Right third toe gangrene/ OM/ septic arthritis in setting of PVD in RLE  # 2nd MTP septic arthritis   - s/p right third toe ray amputation 12/4/2019   - OR cultures: Gram Stain: Numerous Gram Negative Rods seen per oil power field  - Afebrile on admission. TMax in .0 F. + Leukocytosis (13.2) with neutrophilic predominance. Tylenol PRN  - ESR elevated at 70. CRP pending  - blood cultures negative x 2 , follow up repeat blood cultures s/p OR yesterday as patient spiked a fever   - R foot XR : gangrene R 3rd toe and recurrent OM, worsening R 3rd  MTP septic arthritis and OM; R 2nd MTP worsened septic arthritis    -Vascular surgery (Dr. Espinosa) consult f/u:  discharged with follow-up scheduled with Dr. Espinosa after angiogram revealed moderate R tibial disease  - Antibiotics per ID. Had PICC line placed prior to discharge on last admission (discharged 11/21) : Cefepime 2 g IV q12h, Levofloxacin 750 mg IV q24h, Flagyl 500 mg IV q8h    # TYLER -resolved  - Baseline Cr 0.9-1.1   - Cr trend 1.3 today <--1.4<--1.1  - d/c ivf today     # Dyspnea with fatigue, loss of appetite x3 days - resolved  - Characterizes dyspnea on exertion. Denies orthopnea or PND. No prior history of CHF  - TTE: G1DD 55-60% EF, borderline pulmonary hypertension  - CXR negative   - Hgb 9.3 (baseline ~10.0).  Transfuse <7.  Ative T+S    # Insulin-dependent T2DM  - Monitor fingersticks with meals and at bedtime  - lispro, lantus    # Hypertension - well controlled  - Takes Benazepril 40 mg qD at home  - C/w Lisinopril 40 mg qD for now  - Monitor BP    # Hyperlipidemia  - C/w Atorvastatin 40 mg qHS for now    # GERD  - C/w PO Protonix 40 mg qD for now  # Depression  - Was taking Sertraline 50 mg qD. Per patient and pharmacy, has not been taking medication since August  - Follow-up outpatient for resumption of medication  - Monitor for any SI/HI while inpatient    #DVT ppx: SCDs   #GI ppx: Protonix  #Diet: dash/tlc    FULL CODE    Handoff: podiatry OR next week for R 2nd , 4th, 5th ray amputation on 12/10 ; wound vac placement was planned for  12/6

## 2019-12-08 LAB
ALBUMIN SERPL ELPH-MCNC: 2.8 G/DL — LOW (ref 3.5–5.2)
ALP SERPL-CCNC: 213 U/L — HIGH (ref 30–115)
ALT FLD-CCNC: 6 U/L — SIGNIFICANT CHANGE UP (ref 0–41)
ANION GAP SERPL CALC-SCNC: 13 MMOL/L — SIGNIFICANT CHANGE UP (ref 7–14)
AST SERPL-CCNC: 11 U/L — SIGNIFICANT CHANGE UP (ref 0–41)
BASOPHILS # BLD AUTO: 0.01 K/UL — SIGNIFICANT CHANGE UP (ref 0–0.2)
BASOPHILS NFR BLD AUTO: 0.1 % — SIGNIFICANT CHANGE UP (ref 0–1)
BILIRUB SERPL-MCNC: 0.3 MG/DL — SIGNIFICANT CHANGE UP (ref 0.2–1.2)
BLD GP AB SCN SERPL QL: SIGNIFICANT CHANGE UP
BUN SERPL-MCNC: 15 MG/DL — SIGNIFICANT CHANGE UP (ref 10–20)
CALCIUM SERPL-MCNC: 8.3 MG/DL — LOW (ref 8.5–10.1)
CHLORIDE SERPL-SCNC: 104 MMOL/L — SIGNIFICANT CHANGE UP (ref 98–110)
CO2 SERPL-SCNC: 22 MMOL/L — SIGNIFICANT CHANGE UP (ref 17–32)
CREAT SERPL-MCNC: 1 MG/DL — SIGNIFICANT CHANGE UP (ref 0.7–1.5)
EOSINOPHIL # BLD AUTO: 0.05 K/UL — SIGNIFICANT CHANGE UP (ref 0–0.7)
EOSINOPHIL NFR BLD AUTO: 0.7 % — SIGNIFICANT CHANGE UP (ref 0–8)
GLUCOSE BLDC GLUCOMTR-MCNC: 114 MG/DL — HIGH (ref 70–99)
GLUCOSE BLDC GLUCOMTR-MCNC: 182 MG/DL — HIGH (ref 70–99)
GLUCOSE BLDC GLUCOMTR-MCNC: 225 MG/DL — HIGH (ref 70–99)
GLUCOSE BLDC GLUCOMTR-MCNC: 229 MG/DL — HIGH (ref 70–99)
GLUCOSE SERPL-MCNC: 112 MG/DL — HIGH (ref 70–99)
HCT VFR BLD CALC: 27.2 % — LOW (ref 42–52)
HGB BLD-MCNC: 8.8 G/DL — LOW (ref 14–18)
IMM GRANULOCYTES NFR BLD AUTO: 1 % — HIGH (ref 0.1–0.3)
LYMPHOCYTES # BLD AUTO: 0.67 K/UL — LOW (ref 1.2–3.4)
LYMPHOCYTES # BLD AUTO: 9.3 % — LOW (ref 20.5–51.1)
MAGNESIUM SERPL-MCNC: 1.8 MG/DL — SIGNIFICANT CHANGE UP (ref 1.8–2.4)
MCHC RBC-ENTMCNC: 28.4 PG — SIGNIFICANT CHANGE UP (ref 27–31)
MCHC RBC-ENTMCNC: 32.4 G/DL — SIGNIFICANT CHANGE UP (ref 32–37)
MCV RBC AUTO: 87.7 FL — SIGNIFICANT CHANGE UP (ref 80–94)
MONOCYTES # BLD AUTO: 0.57 K/UL — SIGNIFICANT CHANGE UP (ref 0.1–0.6)
MONOCYTES NFR BLD AUTO: 7.9 % — SIGNIFICANT CHANGE UP (ref 1.7–9.3)
NEUTROPHILS # BLD AUTO: 5.83 K/UL — SIGNIFICANT CHANGE UP (ref 1.4–6.5)
NEUTROPHILS NFR BLD AUTO: 81 % — HIGH (ref 42.2–75.2)
NRBC # BLD: 0 /100 WBCS — SIGNIFICANT CHANGE UP (ref 0–0)
PLATELET # BLD AUTO: 315 K/UL — SIGNIFICANT CHANGE UP (ref 130–400)
POTASSIUM SERPL-MCNC: 4.2 MMOL/L — SIGNIFICANT CHANGE UP (ref 3.5–5)
POTASSIUM SERPL-SCNC: 4.2 MMOL/L — SIGNIFICANT CHANGE UP (ref 3.5–5)
PROT SERPL-MCNC: 5.8 G/DL — LOW (ref 6–8)
RBC # BLD: 3.1 M/UL — LOW (ref 4.7–6.1)
RBC # FLD: 13.5 % — SIGNIFICANT CHANGE UP (ref 11.5–14.5)
SODIUM SERPL-SCNC: 139 MMOL/L — SIGNIFICANT CHANGE UP (ref 135–146)
WBC # BLD: 7.2 K/UL — SIGNIFICANT CHANGE UP (ref 4.8–10.8)
WBC # FLD AUTO: 7.2 K/UL — SIGNIFICANT CHANGE UP (ref 4.8–10.8)

## 2019-12-08 PROCEDURE — 99232 SBSQ HOSP IP/OBS MODERATE 35: CPT

## 2019-12-08 RX ORDER — MUPIROCIN 20 MG/G
1 OINTMENT TOPICAL
Refills: 0 | Status: DISCONTINUED | OUTPATIENT
Start: 2019-12-08 | End: 2019-12-10

## 2019-12-08 RX ADMIN — Medication 1: at 12:07

## 2019-12-08 RX ADMIN — Medication 81 MILLIGRAM(S): at 11:16

## 2019-12-08 RX ADMIN — INSULIN GLARGINE 40 UNIT(S): 100 INJECTION, SOLUTION SUBCUTANEOUS at 21:49

## 2019-12-08 RX ADMIN — Medication 13 UNIT(S): at 08:03

## 2019-12-08 RX ADMIN — Medication 100 MILLIGRAM(S): at 21:49

## 2019-12-08 RX ADMIN — Medication 13 UNIT(S): at 12:07

## 2019-12-08 RX ADMIN — Medication 2: at 17:05

## 2019-12-08 RX ADMIN — CHLORHEXIDINE GLUCONATE 1 APPLICATION(S): 213 SOLUTION TOPICAL at 05:05

## 2019-12-08 RX ADMIN — Medication 100 MILLIGRAM(S): at 14:57

## 2019-12-08 RX ADMIN — MUPIROCIN 1 APPLICATION(S): 20 OINTMENT TOPICAL at 17:08

## 2019-12-08 RX ADMIN — LISINOPRIL 40 MILLIGRAM(S): 2.5 TABLET ORAL at 05:05

## 2019-12-08 RX ADMIN — Medication 100 MILLIGRAM(S): at 05:05

## 2019-12-08 RX ADMIN — CEFEPIME 100 MILLIGRAM(S): 1 INJECTION, POWDER, FOR SOLUTION INTRAMUSCULAR; INTRAVENOUS at 05:05

## 2019-12-08 RX ADMIN — Medication 13 UNIT(S): at 17:05

## 2019-12-08 RX ADMIN — ATORVASTATIN CALCIUM 40 MILLIGRAM(S): 80 TABLET, FILM COATED ORAL at 21:49

## 2019-12-08 RX ADMIN — CEFEPIME 100 MILLIGRAM(S): 1 INJECTION, POWDER, FOR SOLUTION INTRAMUSCULAR; INTRAVENOUS at 17:08

## 2019-12-08 RX ADMIN — Medication 60 MILLIGRAM(S): at 05:05

## 2019-12-08 RX ADMIN — ENOXAPARIN SODIUM 40 MILLIGRAM(S): 100 INJECTION SUBCUTANEOUS at 11:17

## 2019-12-08 RX ADMIN — PANTOPRAZOLE SODIUM 40 MILLIGRAM(S): 20 TABLET, DELAYED RELEASE ORAL at 05:05

## 2019-12-08 NOTE — PROGRESS NOTE ADULT - ASSESSMENT
69 year-old male with PMH of insulin-dependent DM c/b diabetic foot ulcers s/p amputations of half of left great toe and entire right great toe, PVD, HTN, HLD, GERD, and depression who p/w worsening of a previously diagnosed R third toe wound found to have worsening gangrene requiring amputation of R third toe.    # Right third toe gangrene/ OM/ septic arthritis in setting of PVD in RLE  # 2nd MTP septic arthritis   SEPSIS RULED OUT ON ADMISSION  - s/p right third toe ray amputation 12/4/2019 - OR cultures showing multiple bacteria. On IV Abx per ID.   Blood cultures hitherto negative.  - R foot XR : gangrene R 3rd toe and recurrent OM, worsening R 3rd  MTP septic arthritis and OM; R 2nd MTP worsened septic arthritis   Podiatry now planning Right 2nd, 4th, 5th ray amputation 12/10  Podiatry Requests Vascular consult as well  Last time Vascular (Dr. Espinosa) saw him in Nov. for a RLE angiogram that revealed moderate R tibial disease.      # TYLER  - resolved  D/jenny IVfs     # Insulin-dependent T2DM  - Monitor fingersticks with meals and at bedtime  - lispro, lantus    # Hypertension - well controlled  - Takes Benazepril 40 mg qD at home  - C/w Lisinopril 40 mg qD for now  - Monitor BP    # Hyperlipidemia  - C/w Atorvastatin 40 mg qHS for now    # GERD  - C/w PO Protonix 40 mg qD for now    # Depression  - Was taking Sertraline 50 mg qD. Per patient and pharmacy, has not been taking medication since August  - Follow-up outpatient for resumption of medication  - Monitor for any SI/HI while inpatient    #DVT ppx: SCDs   #GI ppx: Protonix  #Diet: dash/tlc    FULL CODE    Dispo: acute .

## 2019-12-08 NOTE — PROGRESS NOTE ADULT - SUBJECTIVE AND OBJECTIVE BOX
S: No new events/complaints      All other pertinent ROS negative.      Vital Signs Last 24 Hrs  T(C): 36 (08 Dec 2019 13:22), Max: 36.9 (08 Dec 2019 04:56)  T(F): 96.8 (08 Dec 2019 13:22), Max: 98.5 (08 Dec 2019 04:56)  HR: 78 (08 Dec 2019 13:22) (78 - 82)  BP: 116/58 (08 Dec 2019 13:22) (116/58 - 134/64)  BP(mean): --  RR: 18 (08 Dec 2019 13:22) (18 - 18)  SpO2: --  PHYSICAL EXAM:    no change from prior    MEDICATIONS:  MEDICATIONS  (STANDING):  aspirin enteric coated 81 milliGRAM(s) Oral daily  atorvastatin 40 milliGRAM(s) Oral at bedtime  cefepime   IVPB      cefepime   IVPB 2000 milliGRAM(s) IV Intermittent every 12 hours  chlorhexidine 4% Liquid 1 Application(s) Topical <User Schedule>  dextrose 5%. 1000 milliLiter(s) (50 mL/Hr) IV Continuous <Continuous>  dextrose 50% Injectable 12.5 Gram(s) IV Push once  dextrose 50% Injectable 25 Gram(s) IV Push once  dextrose 50% Injectable 25 Gram(s) IV Push once  enoxaparin Injectable 40 milliGRAM(s) SubCutaneous daily  insulin glargine Injectable (LANTUS) 40 Unit(s) SubCutaneous at bedtime  insulin lispro (HumaLOG) corrective regimen sliding scale   SubCutaneous three times a day before meals  insulin lispro Injectable (HumaLOG) 13 Unit(s) SubCutaneous three times a day before meals  levoFLOXacin IVPB      levoFLOXacin IVPB 750 milliGRAM(s) IV Intermittent every 24 hours  lisinopril 40 milliGRAM(s) Oral daily  metroNIDAZOLE  IVPB 500 milliGRAM(s) IV Intermittent every 8 hours  mupirocin 2% Ointment 1 Application(s) Topical two times a day  NIFEdipine XL 60 milliGRAM(s) Oral daily  pantoprazole    Tablet 40 milliGRAM(s) Oral before breakfast      LABS: All Labs Reviewed:                        8.8    7.20  )-----------( 315      ( 08 Dec 2019 07:09 )             27.2     12-08    139  |  104  |  15  ----------------------------<  112<H>  4.2   |  22  |  1.0    Ca    8.3<L>      08 Dec 2019 07:09  Mg     1.8     12-08    TPro  5.8<L>  /  Alb  2.8<L>  /  TBili  0.3  /  DBili  x   /  AST  11  /  ALT  6   /  AlkPhos  213<H>  12-08          Blood Culture: 12-04 @ 22:04  Organism --  Gram Stain Blood -- Gram Stain --  Specimen Source .Blood None  Culture-Blood --    12-04 @ 21:00  Organism Enterobacter cloacae complex  Gram Stain Blood -- Gram Stain   Numerous Gram Negative Rods seen per oil power field  No polymorphonuclear leukocytes seen per low power field  Specimen Source .Surgical Swab None  Culture-Blood --    12-04 @ 14:20  Organism Stenotrophomonas maltophilia  Gram Stain Blood -- Gram Stain --  Specimen Source .Abscess toe left  Culture-Blood --    12-03 @ 16:27  Organism --  Gram Stain Blood -- Gram Stain --  Specimen Source .Blood None  Culture-Blood --        Radiology: reviewed

## 2019-12-09 LAB
ALBUMIN SERPL ELPH-MCNC: 2.8 G/DL — LOW (ref 3.5–5.2)
ALP SERPL-CCNC: 234 U/L — HIGH (ref 30–115)
ALT FLD-CCNC: 6 U/L — SIGNIFICANT CHANGE UP (ref 0–41)
ANION GAP SERPL CALC-SCNC: 12 MMOL/L — SIGNIFICANT CHANGE UP (ref 7–14)
AST SERPL-CCNC: 12 U/L — SIGNIFICANT CHANGE UP (ref 0–41)
BASOPHILS # BLD AUTO: 0.02 K/UL — SIGNIFICANT CHANGE UP (ref 0–0.2)
BASOPHILS NFR BLD AUTO: 0.3 % — SIGNIFICANT CHANGE UP (ref 0–1)
BILIRUB SERPL-MCNC: 0.3 MG/DL — SIGNIFICANT CHANGE UP (ref 0.2–1.2)
BUN SERPL-MCNC: 15 MG/DL — SIGNIFICANT CHANGE UP (ref 10–20)
CALCIUM SERPL-MCNC: 8.2 MG/DL — LOW (ref 8.5–10.1)
CHLORIDE SERPL-SCNC: 105 MMOL/L — SIGNIFICANT CHANGE UP (ref 98–110)
CO2 SERPL-SCNC: 24 MMOL/L — SIGNIFICANT CHANGE UP (ref 17–32)
CREAT SERPL-MCNC: 1 MG/DL — SIGNIFICANT CHANGE UP (ref 0.7–1.5)
CULTURE RESULTS: SIGNIFICANT CHANGE UP
EOSINOPHIL # BLD AUTO: 0.09 K/UL — SIGNIFICANT CHANGE UP (ref 0–0.7)
EOSINOPHIL NFR BLD AUTO: 1.2 % — SIGNIFICANT CHANGE UP (ref 0–8)
GLUCOSE BLDC GLUCOMTR-MCNC: 104 MG/DL — HIGH (ref 70–99)
GLUCOSE BLDC GLUCOMTR-MCNC: 144 MG/DL — HIGH (ref 70–99)
GLUCOSE BLDC GLUCOMTR-MCNC: 183 MG/DL — HIGH (ref 70–99)
GLUCOSE BLDC GLUCOMTR-MCNC: 246 MG/DL — HIGH (ref 70–99)
GLUCOSE SERPL-MCNC: 168 MG/DL — HIGH (ref 70–99)
HCT VFR BLD CALC: 28.3 % — LOW (ref 42–52)
HGB BLD-MCNC: 8.8 G/DL — LOW (ref 14–18)
IMM GRANULOCYTES NFR BLD AUTO: 1.2 % — HIGH (ref 0.1–0.3)
LYMPHOCYTES # BLD AUTO: 0.73 K/UL — LOW (ref 1.2–3.4)
LYMPHOCYTES # BLD AUTO: 10.1 % — LOW (ref 20.5–51.1)
MAGNESIUM SERPL-MCNC: 1.7 MG/DL — LOW (ref 1.8–2.4)
MCHC RBC-ENTMCNC: 27.3 PG — SIGNIFICANT CHANGE UP (ref 27–31)
MCHC RBC-ENTMCNC: 31.1 G/DL — LOW (ref 32–37)
MCV RBC AUTO: 87.9 FL — SIGNIFICANT CHANGE UP (ref 80–94)
MONOCYTES # BLD AUTO: 0.71 K/UL — HIGH (ref 0.1–0.6)
MONOCYTES NFR BLD AUTO: 9.8 % — HIGH (ref 1.7–9.3)
NEUTROPHILS # BLD AUTO: 5.62 K/UL — SIGNIFICANT CHANGE UP (ref 1.4–6.5)
NEUTROPHILS NFR BLD AUTO: 77.4 % — HIGH (ref 42.2–75.2)
NRBC # BLD: 0 /100 WBCS — SIGNIFICANT CHANGE UP (ref 0–0)
PLATELET # BLD AUTO: 349 K/UL — SIGNIFICANT CHANGE UP (ref 130–400)
POTASSIUM SERPL-MCNC: 4.5 MMOL/L — SIGNIFICANT CHANGE UP (ref 3.5–5)
POTASSIUM SERPL-SCNC: 4.5 MMOL/L — SIGNIFICANT CHANGE UP (ref 3.5–5)
PROT SERPL-MCNC: 6 G/DL — SIGNIFICANT CHANGE UP (ref 6–8)
RBC # BLD: 3.22 M/UL — LOW (ref 4.7–6.1)
RBC # FLD: 13.5 % — SIGNIFICANT CHANGE UP (ref 11.5–14.5)
SODIUM SERPL-SCNC: 141 MMOL/L — SIGNIFICANT CHANGE UP (ref 135–146)
SPECIMEN SOURCE: SIGNIFICANT CHANGE UP
SURGICAL PATHOLOGY STUDY: SIGNIFICANT CHANGE UP
WBC # BLD: 7.26 K/UL — SIGNIFICANT CHANGE UP (ref 4.8–10.8)
WBC # FLD AUTO: 7.26 K/UL — SIGNIFICANT CHANGE UP (ref 4.8–10.8)

## 2019-12-09 PROCEDURE — 99233 SBSQ HOSP IP/OBS HIGH 50: CPT

## 2019-12-09 RX ORDER — INSULIN LISPRO 100/ML
15 VIAL (ML) SUBCUTANEOUS
Refills: 0 | Status: DISCONTINUED | OUTPATIENT
Start: 2019-12-09 | End: 2019-12-10

## 2019-12-09 RX ORDER — MEROPENEM 1 G/30ML
1000 INJECTION INTRAVENOUS ONCE
Refills: 0 | Status: COMPLETED | OUTPATIENT
Start: 2019-12-09 | End: 2019-12-09

## 2019-12-09 RX ORDER — INSULIN GLARGINE 100 [IU]/ML
20 INJECTION, SOLUTION SUBCUTANEOUS ONCE
Refills: 0 | Status: COMPLETED | OUTPATIENT
Start: 2019-12-09 | End: 2019-12-09

## 2019-12-09 RX ORDER — MAGNESIUM SULFATE 500 MG/ML
1 VIAL (ML) INJECTION ONCE
Refills: 0 | Status: COMPLETED | OUTPATIENT
Start: 2019-12-09 | End: 2019-12-09

## 2019-12-09 RX ORDER — MEROPENEM 1 G/30ML
1000 INJECTION INTRAVENOUS EVERY 8 HOURS
Refills: 0 | Status: DISCONTINUED | OUTPATIENT
Start: 2019-12-09 | End: 2019-12-10

## 2019-12-09 RX ORDER — MEROPENEM 1 G/30ML
INJECTION INTRAVENOUS
Refills: 0 | Status: DISCONTINUED | OUTPATIENT
Start: 2019-12-09 | End: 2019-12-10

## 2019-12-09 RX ORDER — MAGNESIUM SULFATE 500 MG/ML
2 VIAL (ML) INJECTION ONCE
Refills: 0 | Status: COMPLETED | OUTPATIENT
Start: 2019-12-09 | End: 2019-12-09

## 2019-12-09 RX ADMIN — ATORVASTATIN CALCIUM 40 MILLIGRAM(S): 80 TABLET, FILM COATED ORAL at 21:10

## 2019-12-09 RX ADMIN — LISINOPRIL 40 MILLIGRAM(S): 2.5 TABLET ORAL at 05:19

## 2019-12-09 RX ADMIN — MEROPENEM 100 MILLIGRAM(S): 1 INJECTION INTRAVENOUS at 12:18

## 2019-12-09 RX ADMIN — Medication 15 UNIT(S): at 12:24

## 2019-12-09 RX ADMIN — Medication 15 UNIT(S): at 17:12

## 2019-12-09 RX ADMIN — CHLORHEXIDINE GLUCONATE 1 APPLICATION(S): 213 SOLUTION TOPICAL at 05:19

## 2019-12-09 RX ADMIN — Medication 2: at 17:13

## 2019-12-09 RX ADMIN — Medication 60 MILLIGRAM(S): at 05:19

## 2019-12-09 RX ADMIN — MUPIROCIN 1 APPLICATION(S): 20 OINTMENT TOPICAL at 17:14

## 2019-12-09 RX ADMIN — Medication 100 MILLIGRAM(S): at 05:20

## 2019-12-09 RX ADMIN — Medication 13 UNIT(S): at 08:35

## 2019-12-09 RX ADMIN — Medication 50 GRAM(S): at 08:50

## 2019-12-09 RX ADMIN — MUPIROCIN 1 APPLICATION(S): 20 OINTMENT TOPICAL at 05:19

## 2019-12-09 RX ADMIN — MEROPENEM 100 MILLIGRAM(S): 1 INJECTION INTRAVENOUS at 22:00

## 2019-12-09 RX ADMIN — CEFEPIME 100 MILLIGRAM(S): 1 INJECTION, POWDER, FOR SOLUTION INTRAMUSCULAR; INTRAVENOUS at 05:20

## 2019-12-09 RX ADMIN — ENOXAPARIN SODIUM 40 MILLIGRAM(S): 100 INJECTION SUBCUTANEOUS at 11:52

## 2019-12-09 RX ADMIN — INSULIN GLARGINE 20 UNIT(S): 100 INJECTION, SOLUTION SUBCUTANEOUS at 22:22

## 2019-12-09 RX ADMIN — Medication 81 MILLIGRAM(S): at 11:51

## 2019-12-09 RX ADMIN — PANTOPRAZOLE SODIUM 40 MILLIGRAM(S): 20 TABLET, DELAYED RELEASE ORAL at 05:19

## 2019-12-09 NOTE — PROGRESS NOTE ADULT - ASSESSMENT
ASSESSMENT  68 y/o Male with a PMH of DM, Previous DFU's, PVD, HTN, HLD, GERD, Depression. s/p Right Hallux Amputation, recent admission for plantar ulcer after stepping on a screw, d/c with PICC    IMPRESSION  #Gangrenous Right 3rd Digit w/ Chronic Diabetic Pressure Ulcer Plantar Aspect    3rd toe cx   Numerous Stenotrophomonas maltophilia    OR cx Enterobacter (R cefepime, zosyn, S fluoro/bactrim)    12/5 s/p Ray amputation of third toe of right foot   #Previous Admission; Grew MSSA; (PICC 11/14-11/21); Ceftriaxone 2g QD and Flagyl 0.5g BID  #DM    RECOMMENDATIONS  - CHANGE cefepime/flagyl to toni 1g q8h IV as enterobacter is R to cefepime  - levaquin 750mg daily PO for Stenotrophomonas   - has a picc line, clean  - Plan for D/C with ertapenem 1g q24h IV and PO levaquin 750 daily x 4-6 weeks  - Weekly CBC, BMP  - ID follow-up with America Turpin or Thurs Dr. Mohsena Amin      [id number] [facility]       [phone] (call to make an appointment, walk-ins Tuesdays 10:30 AM)      Fax [phone]

## 2019-12-09 NOTE — PROGRESS NOTE ADULT - SUBJECTIVE AND OBJECTIVE BOX
Patient Information:  JOSÉ MANUEL PORTER / 69y / Male / MRN#:[id number]    Hospital Day: 6d    HPI:  69 year-old male with PMH of insulin-dependent DM c/b diabetic foot ulcers s/p amputations of half of left great toe and entire right great toe, PVD, HTN, HLD, GERD, and depression who presents with worsening of a previously diagnosed R third toe wound. Patient was recently admitted to [facility] from 11/14-11/21 for worsening of the same diabetic foot ulcer of right foot with OM with cultures growing MSSA; patient had PICC line placed prior to discharge and was receiving PO Flagyl 500 mg BID and IV Rocephin 2 g qD with scheduled ID (Dr. Gary Turpin) follow-up.    Today, patient presents with mild pain and numbness in right third toe. Per patient and wife at bedside, he had appointment with Vascular Surgery scheduled for today but decided to come to the ED due to worsening appearance of toe (was starting to appear darker) in addition to feeling short of breath, fatigue, and loss of appetite for past three days. + Pain in R foot, + loss of appetite, + fatigue, + shortness of breath x3 days. Denies fever/chills/HA/change in vision/rhinorrhea/sore throat/cough/chest pain/abdominal pain/constipation/diarrhea.    Interval History:  Patient seen and examined at bedside. No acute events overnight.    Past Medical History:  GERD (gastroesophageal reflux disease)  Depression  Diabetic foot ulcer  Dyslipidemia  HTN (hypertension)  DM (diabetes mellitus)    Past Surgical History:  Toe amputation status, right    Allergies:  No Known Allergies    Medications:  PRN:  acetaminophen   Tablet .. 650 milliGRAM(s) Oral every 6 hours PRN Temp greater or equal to 38C (100.4F)  dextrose 40% Gel 15 Gram(s) Oral once PRN Blood Glucose LESS THAN 70 milliGRAM(s)/deciliter  glucagon  Injectable 1 milliGRAM(s) IntraMuscular once PRN Glucose LESS THAN 70 milligrams/deciliter  morphine  - Injectable 2 milliGRAM(s) IV Push every 6 hours PRN Moderate Pain (4 - 6)  morphine  - Injectable 4 milliGRAM(s) IV Push every 6 hours PRN Severe Pain (7 - 10)    Standing:  aspirin enteric coated 81 milliGRAM(s) Oral daily  atorvastatin 40 milliGRAM(s) Oral at bedtime  cefepime   IVPB      cefepime   IVPB 2000 milliGRAM(s) IV Intermittent every 12 hours  chlorhexidine 4% Liquid 1 Application(s) Topical <User Schedule>  dextrose 5%. 1000 milliLiter(s) (50 mL/Hr) IV Continuous <Continuous>  dextrose 50% Injectable 12.5 Gram(s) IV Push once  dextrose 50% Injectable 25 Gram(s) IV Push once  dextrose 50% Injectable 25 Gram(s) IV Push once  enoxaparin Injectable 40 milliGRAM(s) SubCutaneous daily  insulin glargine Injectable (LANTUS) 40 Unit(s) SubCutaneous at bedtime  insulin lispro (HumaLOG) corrective regimen sliding scale   SubCutaneous three times a day before meals  insulin lispro Injectable (HumaLOG) 15 Unit(s) SubCutaneous three times a day before meals  levoFLOXacin IVPB      levoFLOXacin IVPB 750 milliGRAM(s) IV Intermittent every 24 hours  lisinopril 40 milliGRAM(s) Oral daily  magnesium sulfate  IVPB 1 Gram(s) IV Intermittent once  metroNIDAZOLE  IVPB 500 milliGRAM(s) IV Intermittent every 8 hours  mupirocin 2% Ointment 1 Application(s) Topical two times a day  NIFEdipine XL 60 milliGRAM(s) Oral daily  pantoprazole    Tablet 40 milliGRAM(s) Oral before breakfast    Home:  benazepril 40 mg oral tablet: 1 tab(s) orally once a day  Crestor 10 mg oral tablet: 1 tab(s) orally once a day (at bedtime)  HumuLIN 70/30 KwikPen 70 units-30 units/mL subcutaneous suspension: 50 unit(s) subcutaneous once a day before breakfast  HumuLIN 70/30 subcutaneous suspension: 40 unit(s) subcutaneous once a day (at bedtime)  Janumet 50 mg-1000 mg oral tablet: 1 tab(s) orally 2 times a day  omeprazole 20 mg oral delayed release capsule: 1 cap(s) orally once a day    Vitals:  T(C): 36.6, Max: 36.9 (12-08-19 @ 20:25)  T(F): 97.9, Max: 98.5 (12-08-19 @ 20:25)  HR: 90 (78 - 91)  BP: 141/78 (116/58 - 141/78)  RR: 18 (18 - 18)  SpO2: --    Physical Exam:  General: Awake, Alert. Not in acute distress.  Heart: Regular rate and rhythm; S1, S2; No murmurs.  Lungs: Clear to auscultation bilaterally.  Abdomen: Soft, nontender, nondistended.  Extremities: Right foot wrapped with wound vac  Neuro: AAOx3, No focal deficits.    Labs:  CBC (12-09 @ 05:41)                        Hgb: 8.8    WBC: 7.26  )-----------------( Plts: 349                              Hct: 28.3     Chem (12-09 @ 05:41)  Na: 141  |     Cl: 105     |  BUN: 15  -----------------------------------------< Gluc: 168    K: 4.5   |    HCO3: 24  |  Cr: 1.0    Ca 8.2 (12-09 @ 05:41)  Mg 1.7 (12-09 @ 05:41)    LFTs (12-09 @ 05:41)  TPro 6.0  /  Alb 2.8  TBili 0.3  /  DBili     AST 12  /  ALT 6   /  AlkPhos 234    Microbiology:  Culture - Blood (collected 12-04 @ 22:04)  Source: .Blood None  Preliminary Report (12-06 @ 02:14):    No growth to date.    Culture - Tissue with Gram Stain (collected 12-04 @ 21:00)  Source: .Tissue None  Gram Stain (12-05 @ 12:57):    Numerous Gram Negative Rods seen per oil power field    No polymorphonuclear leukocytes seen per low power field  Preliminary Report (12-08 @ 08:38):    Numerous Enterobacter cloacae complex    Few Corynebacterium species "Susceptibilities not performed"  Organism: Enterobacter cloacae complex (12-07 @ 09:10)  Organism: Enterobacter cloacae complex (12-07 @ 09:10)    Culture - Fungal, Other (collected 12-04 @ 21:00)  Source: .Other None  Preliminary Report (12-06 @ 06:24):    Testing in progress    Culture - Surgical Swab (collected 12-04 @ 21:00)  Source: .Surgical Swab None  Preliminary Report (12-07 @ 09:46):    Numerous Enterobacter cloacae complex    Numerous Stenotrophomonas maltophilia    Few Corynebacterium species "Susceptibilities not performed"  Organism: Enterobacter cloacae complex  Stenotrophomonas maltophilia (12-07 @ 09:24)  Organism: Stenotrophomonas maltophilia (12-07 @ 09:24)  Organism: Enterobacter cloacae complex (12-07 @ 09:23)    Culture - Abscess with Gram Stain (collected 12-04 @ 14:20)  Source: .Abscess toe left  Final Report (12-07 @ 18:01):    Culture yields >4 types of aerobic and/or anaerobic bacteria    Call client services within 7 days if further workup is clinically    indicated.    Culture includes    Numerous Stenotrophomonas maltophilia    Numerous Pseudomonas aeruginosa  Organism: Stenotrophomonas maltophilia  Pseudomonas aeruginosa (12-07 @ 18:01)  Organism: Pseudomonas aeruginosa (12-07 @ 18:01)  Organism: Stenotrophomonas maltophilia (12-07 @ 18:01)    Culture - Blood (collected 12-03 @ 16:27)  Source: .Blood None  Final Report (12-09 @ 02:14):    No growth at 5 days.

## 2019-12-09 NOTE — PROGRESS NOTE ADULT - SUBJECTIVE AND OBJECTIVE BOX
PROGRESS NOTE   Pt seen @ bedside during AM rounds.      Vital Signs Last 24 Hrs  T(C): 36.6 (09 Dec 2019 04:55), Max: 36.9 (08 Dec 2019 20:25)  T(F): 97.9 (09 Dec 2019 04:55), Max: 98.5 (08 Dec 2019 20:25)  HR: 90 (09 Dec 2019 04:55) (78 - 91)  BP: 141/78 (09 Dec 2019 04:55) (116/58 - 141/78)  BP(mean): --  RR: 18 (08 Dec 2019 20:25) (18 - 18)  SpO2: --                          8.8    7.26  )-----------( 349      ( 09 Dec 2019 05:41 )             28.3               12-09    141  |  105  |  15  ----------------------------<  168<H>  4.5   |  24  |  1.0    Ca    8.2<L>      09 Dec 2019 05:41  Mg     1.7     12-09    TPro  6.0  /  Alb  2.8<L>  /  TBili  0.3  /  DBili  x   /  AST  12  /  ALT  6   /  AlkPhos  234<H>  12-09      PHYSICAL EXAM  Right LE Focused:  DERM: Increase gangrenous changes to the second toe. Wound base at surgical site appears fibro/granular. No drainage   Open plantar wound base fibrogranular tracking dorsally  Musk: hx of amputation left hallux      Assesment:  S/p 3rd ray resection, right foot (DOS: 12/4/2019)  DM  PVD      Plan:  Pt. seen and evaluated.  D/C of the wound vac   washed with soap and water and applied Wet to dry R foot   Sx scheduled for Tuesday 7:30am, 12/10 for 2nd, 4th, 5th ray Amp, w/ possible closure right foot   NPO @MN  Please optimize all pre-surgical labs prior to surgery  Pt aware of the procedure  f/u with attending

## 2019-12-09 NOTE — PROGRESS NOTE ADULT - SUBJECTIVE AND OBJECTIVE BOX
JOSÉ MANUEL PORTER  69y, Male  Allergy: No Known Allergies      CHIEF COMPLAINT: Worsening Right Diabetic Foot Ulcer (09 Dec 2019 09:23)      INTERVAL EVENTS/HPI  - No acute events overnight  - T(F): , Max: 98.5 (12-08-19 @ 20:25)  - Denies any worsening symptoms  - Tolerating medication  - WBC Count: 7.26 (12-09-19 @ 05:41)  WBC Count: 7.20 (12-08-19 @ 07:09)  - Creatinine, Serum: 1.0 (12-09-19 @ 05:41)  Creatinine, Serum: 1.0 (12-08-19 @ 07:09)       ROS  General: Denies rigors, nightsweats  HEENT: Denies headache, rhinorrhea, sore throat, eye pain  CV: Denies CP, palpitations  PULM: Denies wheezing, hemoptysis  GI: Denies hematemesis, hematochezia, melena  : Denies discharge, hematuria  MSK: Denies arthralgias, myalgias  SKIN: Denies rash, lesions  NEURO: Denies paresthesias, weakness  PSYCH: Denies depression, anxiety    VITALS:  T(F): 97.9, Max: 98.5 (12-08-19 @ 20:25)  HR: 90  BP: 141/78  RR: 18Vital Signs Last 24 Hrs  T(C): 36.6 (09 Dec 2019 04:55), Max: 36.9 (08 Dec 2019 20:25)  T(F): 97.9 (09 Dec 2019 04:55), Max: 98.5 (08 Dec 2019 20:25)  HR: 90 (09 Dec 2019 04:55) (78 - 91)  BP: 141/78 (09 Dec 2019 04:55) (116/58 - 141/78)  BP(mean): --  RR: 18 (08 Dec 2019 20:25) (18 - 18)  SpO2: --    PHYSICAL EXAM:  Gen: NAD, resting in bed  HEENT: Normocephalic, atraumatic  Neck: supple, no lymphadenopathy  CV: Regular rate & regular rhythm  Lungs: decreased BS at bases  Abdomen: Soft, BS present  Ext: Warm, well perfused, R dressings  Neuro: non focal, awake  Skin: no rash, no erythema    FH: Non-contributory  Social Hx: Non-contributory    TESTS & MEASUREMENTS:                        8.8    7.26  )-----------( 349      ( 09 Dec 2019 05:41 )             28.3     12-09    141  |  105  |  15  ----------------------------<  168<H>  4.5   |  24  |  1.0    Ca    8.2<L>      09 Dec 2019 05:41  Mg     1.7     12-09    TPro  6.0  /  Alb  2.8<L>  /  TBili  0.3  /  DBili  x   /  AST  12  /  ALT  6   /  AlkPhos  234<H>  12-09    eGFR if Non African American: 75 mL/min/1.73M2 (12-09-19 @ 05:41)  eGFR if : 87 mL/min/1.73M2 (12-09-19 @ 05:41)    LIVER FUNCTIONS - ( 09 Dec 2019 05:41 )  Alb: 2.8 g/dL / Pro: 6.0 g/dL / ALK PHOS: 234 U/L / ALT: 6 U/L / AST: 12 U/L / GGT: x               Culture - Blood (collected 12-04-19 @ 22:04)  Source: .Blood None  Preliminary Report (12-06-19 @ 02:14):    No growth to date.    Culture - Tissue with Gram Stain (collected 12-04-19 @ 21:00)  Source: .Tissue None  Gram Stain (12-05-19 @ 12:57):    Numerous Gram Negative Rods seen per oil power field    No polymorphonuclear leukocytes seen per low power field  Preliminary Report (12-08-19 @ 08:38):    Numerous Enterobacter cloacae complex    Few Corynebacterium species "Susceptibilities not performed"  Organism: Enterobacter cloacae complex (12-07-19 @ 09:10)  Organism: Enterobacter cloacae complex (12-07-19 @ 09:10)      -  Amikacin: S <=16      -  Amoxicillin/Clavulanic Acid: R >16/8      -  Ampicillin: R >16 These ampicillin results predict results for amoxicillin      -  Ampicillin/Sulbactam: R >16/8 Enterobacter, Citrobacter, and Serratia may develop resistance during prolonged therapy (3-4 days)      -  Aztreonam: R >16      -  Cefazolin: R >16 Enterobacter, Citrobacter, and Serratia may develop resistance during prolonged therapy (3-4 days)      -  Cefepime: R >16      -  Cefoxitin: R >16      -  Ceftriaxone: R >32 Enterobacter, Citrobacter, and Serratia may develop resistance during prolonged therapy      -  Ciprofloxacin: S <=1      -  Ertapenem: I 1      -  Gentamicin: S <=2      -  Imipenem: S <=1      -  Levofloxacin: S <=2      -  Meropenem: S <=1      -  Piperacillin/Tazobactam: R >64      -  Tobramycin: S <=2      -  Trimethoprim/Sulfamethoxazole: S <=2/38      Method Type: LINDSEY    Culture - Fungal, Other (collected 12-04-19 @ 21:00)  Source: .Other None  Preliminary Report (12-06-19 @ 06:24):    Testing in progress    Culture - Surgical Swab (collected 12-04-19 @ 21:00)  Source: .Surgical Swab None  Preliminary Report (12-07-19 @ 09:46):    Numerous Enterobacter cloacae complex    Numerous Stenotrophomonas maltophilia    Few Corynebacterium species "Susceptibilities not performed"  Organism: Enterobacter cloacae complex  Stenotrophomonas maltophilia (12-07-19 @ 09:24)  Organism: Stenotrophomonas maltophilia (12-07-19 @ 09:24)      -  Ceftazidime: I 16      -  Levofloxacin: S <=2      -  Trimethoprim/Sulfamethoxazole: S <=2/38      Method Type: LINDSEY  Organism: Enterobacter cloacae complex (12-07-19 @ 09:23)      -  Amikacin: S <=16      -  Amoxicillin/Clavulanic Acid: R >16/8      -  Ampicillin: R >16 These ampicillin results predict results for amoxicillin      -  Ampicillin/Sulbactam: R >16/8 Enterobacter, Citrobacter, and Serratia may develop resistance during prolonged therapy (3-4 days)      -  Aztreonam: R >16      -  Cefazolin: R >16 Enterobacter, Citrobacter, and Serratia may develop resistance during prolonged therapy (3-4 days)      -  Cefepime: R >16      -  Cefoxitin: R >16      -  Ceftriaxone: R >32 Enterobacter, Citrobacter, and Serratia may develop resistance during prolonged therapy      -  Ciprofloxacin: S <=1      -  Ertapenem: I 1      -  Gentamicin: S <=2      -  Imipenem: S <=1      -  Levofloxacin: S <=2      -  Meropenem: S <=1      -  Piperacillin/Tazobactam: R >64      -  Tobramycin: S <=2      -  Trimethoprim/Sulfamethoxazole: S <=2/38      Method Type: LINDSEY    Culture - Abscess with Gram Stain (collected 12-04-19 @ 14:20)  Source: .Abscess toe left  Final Report (12-07-19 @ 18:01):    Culture yields >4 types of aerobic and/or anaerobic bacteria    Call client services within 7 days if further workup is clinically    indicated.    Culture includes    Numerous Stenotrophomonas maltophilia    Numerous Pseudomonas aeruginosa  Organism: Stenotrophomonas maltophilia  Pseudomonas aeruginosa (12-07-19 @ 18:01)  Organism: Pseudomonas aeruginosa (12-07-19 @ 18:01)      -  Amikacin: S <=16      -  Aztreonam: S <=4      -  Cefepime: S <=2      -  Ceftazidime: S 4      -  Ciprofloxacin: S <=1      -  Gentamicin: S <=2      -  Imipenem: S 2      -  Levofloxacin: S <=2      -  Meropenem: S <=1      -  Piperacillin/Tazobactam: S <=8      -  Tobramycin: S <=2      Method Type: LINDSEY  Organism: Stenotrophomonas maltophilia (12-07-19 @ 18:01)      -  Ceftazidime: R >16      -  Levofloxacin: S <=2      -  Trimethoprim/Sulfamethoxazole: S <=2/38      Method Type: LINDSEY    Culture - Blood (collected 12-03-19 @ 16:27)  Source: .Blood None  Final Report (12-09-19 @ 02:14):    No growth at 5 days.            INFECTIOUS DISEASES TESTING      RADIOLOGY & ADDITIONAL TESTS:  I have personally reviewed the last Chest xray  CXR      CT      CARDIOLOGY TESTING  Transthoracic Echocardiogram:    EXAM:  2-D ECHO (TTE) COMPLETE        PROCEDURE DATE:  12/04/2019      INTERPRETATION:  REPORT:    TRANSTHORACIC ECHOCARDIOGRAM REPORT         Patient Name:   JOSÉ MANUEL PORTER Accession #: [id number]  Medical Rec #:  [id number]       Height:      71.5 in 181.6 cm  YOB: 1950      Weight:      216.0 lb 97.98 kg  Patient Age:    69 years       BSA:         2.19 m²  Patient Gender: M              BP:          111/58 mmHg       Date of Exam:        12/4/2019 12:23:18 PM  Referring Physician: [id number] ED UNASSIGNED  Sonographer:         Andrea Albright  Reading Physician:   Misha Saeed M.D.    Procedure: 2D Echo/Doppler/Color Doppler Complete.  Diagnosis: Shortness of breath - R06.02         Summary:   1. Left ventricular ejection fraction, by visual estimation, is 55 to   60%.   2. Spectral Doppler shows impaired relaxation pattern of left   ventricular myocardial filling (Grade I diastolic dysfunction).   3. Estimated pulmonary artery systolic pressure is 36.1 mmHg assuming a   right atrial pressure of 3 mmHg, which is consistent with borderline   pulmonary hypertension.    PHYSICIAN INTERPRETATION:  Left Ventricle: The left ventricular internal cavity size is normal. Left   ventricular wall thickness is normal. Left ventricular ejection fraction,   by visual estimation, is 55 to 60%. Spectral Doppler shows impaired   relaxation pattern of left ventricular myocardial filling (Grade I   diastolic dysfunction).  Right Ventricle: Normal right ventricular size and function.  Left Atrium: Normal left atrial size.  Right Atrium: Normal right atrial size.  Pericardium: There is no evidence of pericardial effusion.  Mitral Valve: Structurally normal mitral valve, with normal leaflet   excursion. The mitral valve is normal in structure. No evidence of mitral   valve regurgitation is seen.  Tricuspid Valve: Structurally normal tricuspid valve, with normal leaflet   excursion. The tricuspid valve is normal in structure. No tricuspid   regurgitation is visualized. Estimated pulmonary artery systolic pressure   is 36.1 mmHg assuming a right atrial pressure of 3 mmHg, which is   consistent with borderline pulmonary hypertension.  Aortic Valve: Normal trileaflet aortic valve with normal opening. The   aortic valve is normal. No evidence of aortic valve regurgitation is seen.  Pulmonic Valve: Structurally normal pulmonic valve, with normal leaflet   excursion. The pulmonic valve is normal. No indication of pulmonic valve   regurgitation.  Aorta: The aortic root and ascending aorta are structurally normal, with   no evidence of dilitation.  Pulmonary Artery: The main pulmonary artery is normal in size.       2D AND M-MODE MEASUREMENTS (normal ranges within parentheses):  Left                  Normal   Aorta/Left             Normal  Ventricle:                     Atrium:  IVSd (2D):  0.97 cm  (0.7-1.1) AoV Cusp       2.23  (1.5-2.6)  LVPWd       1.48 cm  (0.7-1.1) Separation:     cm  (2D):                          Left Atrium    4.55  (1.9-4.0)  LVIDd       3.95 cm  (3.4-5.7) (Mmode):        cm  (2D):                          LA Volume      18.8  LVIDs       2.83 cm            Index         ml/m²  (2D):                          Right  LV FS       28.3 %    (>25%)   Ventricle:  (2D):                          RVd (2D):    3.64 cm  IVSd        0.86 cm  (0.7-1.1)  (Mmode):  LVPWd       0.99 cm  (0.7-1.1)  (Mmode):  LVIDd       4.96 cm  (3.4-5.7)  (Mmode):  LVIDs       2.99 cm  (Mmode):  LV FS       39.7 %    (>25%)  (Mmode):  Relative     0.75     (<0.42)  Wall  Thickness  Rel. Wall    0.40     (<0.42)  Thickness  Mm  LV Mass    73.7 g/m²  Index:  Mmode    SPECTRAL DOPPLER ANALYSIS:  LV DIASTOLIC FUNCTION:  MV Peak E: 1.05 m/s Decel Time: 163 msec  MV Peak A: 0.94 m/s  E/A Ratio: 1.11    Aortic Valve:  AoV VMax:   1.32 m/s AoV Area, Vmax: 2.92 cm² Vmax Indx: 1.33 cm²/m²  AoV Pk Grad: 7.0 mmHg    LVOT Vmax: 1.09 m/s  LVOT VTI:  0.25 m  LVOT Diam: 2.13 cm    Mitral Valve:  MV P1/2 Time: 47.32 msec  MV Area, PHT: 4.65 cm²    Tricuspid Valve and PA/RV Systolic Pressure: TR Max Velocity: 2.88 m/s RA   Pressure: 3 mmHg RVSP/PASP: 36.1 mmHg    Pulmonic Valve:  PV Max Velocity: 0.90 m/s PV Max PG: 3.3 mmHg PV Mean PG:       [id number] Misha Saeed M.D., Electronically signed on 12/4/2019 at 2:35:33   PM              *** Final ***                    MISHA SAEED MD  This document has been electronically signed. Dec  4 2019 12:23PM             (12-04-19 @ 12:23)  12 Lead ECG:   Ventricular Rate 87 BPM    Atrial Rate 87 BPM    P-R Interval 132 ms    QRS Duration 66 ms    Q-T Interval 372 ms    QTC Calculation(Bezet) 447 ms    P Axis 64 degrees    R Axis 24 degrees    T Axis 51 degrees    Diagnosis Line Normal sinus rhythm  Nonspecific T wave abnormality  Abnormal ECG    Confirmed by DAVE COWAN MD ([id number]) on 12/3/2019 3:27:36 PM (12-03-19 @ 11:55)      MEDICATIONS  aspirin enteric coated 81  atorvastatin 40  cefepime   IVPB   cefepime   IVPB 2000  chlorhexidine 4% Liquid 1  dextrose 5%. 1000  dextrose 50% Injectable 12.5  dextrose 50% Injectable 25  dextrose 50% Injectable 25  enoxaparin Injectable 40  insulin glargine Injectable (LANTUS) 40  insulin lispro (HumaLOG) corrective regimen sliding scale   insulin lispro Injectable (HumaLOG) 15  levoFLOXacin IVPB   levoFLOXacin IVPB 750  lisinopril 40  magnesium sulfate  IVPB 1  metroNIDAZOLE  IVPB 500  mupirocin 2% Ointment 1  NIFEdipine XL 60  pantoprazole    Tablet 40      ANTIBIOTICS:  cefepime   IVPB      cefepime   IVPB 2000 milliGRAM(s) IV Intermittent every 12 hours  levoFLOXacin IVPB      levoFLOXacin IVPB 750 milliGRAM(s) IV Intermittent every 24 hours  metroNIDAZOLE  IVPB 500 milliGRAM(s) IV Intermittent every 8 hours      All available historical records have been reviewed

## 2019-12-09 NOTE — PROGRESS NOTE ADULT - ASSESSMENT
69 year-old male with PMH of insulin-dependent DM c/b diabetic foot ulcers s/p amputations of half of left great toe and entire right great toe, PVD, HTN, HLD, GERD, and depression who p/w worsening of a previously diagnosed R third toe wound found to have worsening gangrene requiring amputation of R third toe.    # Right third toe gangrene/ OM/ septic arthritis in setting of PVD in RLE  # 2nd MTP septic arthritis   - s/p right third toe ray amputation 12/4/2019   - OR cultures: Gram Stain: Numerous Gram Negative Rods seen per oil power field  - Afebrile on admission. TMax in .0 F. + Leukocytosis (13.2) with neutrophilic predominance. Tylenol PRN  - ESR elevated at 70. CRP pending  - blood cultures negative x 2 , follow up repeat blood cultures s/p OR yesterday as patient spiked a fever   - R foot XR : gangrene R 3rd toe and recurrent OM, worsening R 3rd  MTP septic arthritis and OM; R 2nd MTP worsened septic arthritis    -Vascular surgery (Dr. Espinosa) consult f/u:  discharged with follow-up scheduled with Dr. Espinosa after angiogram revealed moderate R tibial disease  - Antibiotics per ID. Had PICC line placed prior to discharge on last admission (discharged 11/21) : Cefepime 2 g IV q12h, Levofloxacin 750 mg IV q24h, Flagyl 500 mg IV q8h    # TYLER -resolved  - Baseline Cr 0.9-1.1   - Cr trend 1.3 today <--1.4<--1.1  - d/c ivf today     # Dyspnea with fatigue, loss of appetite x3 days - resolved  - Characterizes dyspnea on exertion. Denies orthopnea or PND. No prior history of CHF  - TTE: G1DD 55-60% EF, borderline pulmonary hypertension  - CXR negative   - Hgb 9.3 (baseline ~10.0).  Transfuse <7.  Ative T+S    # Insulin-dependent T2DM  - Monitor fingersticks with meals and at bedtime  - lispro, lantus    # Hypertension - well controlled  - Takes Benazepril 40 mg qD at home  - C/w Lisinopril 40 mg qD for now  - Monitor BP    # Hyperlipidemia  - C/w Atorvastatin 40 mg qHS for now    # GERD  - C/w PO Protonix 40 mg qD for now  # Depression  - Was taking Sertraline 50 mg qD. Per patient and pharmacy, has not been taking medication since August  - Follow-up outpatient for resumption of medication  - Monitor for any SI/HI while inpatient    #DVT ppx: SCDs   #GI ppx: Protonix  #Diet: dash/tlc    FULL CODE 69 year-old male with PMH of insulin-dependent DM c/b diabetic foot ulcers s/p amputations of half of left great toe and entire right great toe, PVD, HTN, HLD, GERD, and depression who p/w worsening of a previously diagnosed R third toe wound found to have worsening gangrene requiring amputation of R third toe.    # Right third toe gangrene/ OM/ septic arthritis in setting of PVD in RLE  # 2nd MTP septic arthritis   - s/p right third toe ray amputation 12/4/2019   - OR cultures: Gram Stain: Numerous Gram Negative Rods seen per oil power field  - Afebrile on admission. TMax in .0 F. + Leukocytosis (13.2) with neutrophilic predominance. Tylenol PRN  - ESR elevated at 70. CRP pending  - blood cultures negative x 2 , follow up repeat blood cultures s/p OR yesterday as patient spiked a fever   - R foot XR : gangrene R 3rd toe and recurrent OM, worsening R 3rd  MTP septic arthritis and OM; R 2nd MTP worsened septic arthritis    -Vascular surgery (Dr. Espinosa) consult f/u:  discharged with follow-up scheduled with Dr. Espinosa after angiogram revealed moderate R tibial disease  - Antibiotics per ID. Had PICC line placed prior to discharge on last admission (discharged 11/21) : Cefepime 2 g IV q12h, Levofloxacin 750 mg IV q24h, Flagyl 500 mg IV q8h    #TYLER -resolved  - Baseline Cr 0.9-1.1   - d/c ivf today    #Dyspnea with fatigue, loss of appetite x3 days - resolved  - Characterizes dyspnea on exertion. Denies orthopnea or PND. No prior history of CHF  - TTE: G1DD 55-60% EF, borderline pulmonary hypertension  - CXR negative   - Hgb 9.3 (baseline ~10.0).  Transfuse <7.  Ative T+S    #Insulin-dependent T2DM  - Monitor fingersticks with meals and at bedtime  - lispro, lantus    #Hypertension - well controlled  - Takes Benazepril 40 mg qD at home  - C/w Lisinopril 40 mg qD for now  - Monitor BP    #Hyperlipidemia  - Continue Atorvastatin 40mg qHS    #GERD  - Continue Protonix    #Depression  - Was taking Sertraline 50mg Daily. Per patient and pharmacy, has not been taking medication since August  - Follow-up outpatient to resume meds  - Monitor for any SI/HI while inpatient    #Pending: Podiatry procedure tomorrow 12/10  #Disposition: From home  #Diet: DASH/TLC, NPO at midnight  #GI Prophylaxis: Protonix 40mg PO Daily  #DVT Prophylaxis: Lovenox 40mg SubQ qHS  #Activity: Ambulate as tolerated  #Code Status: Full Code 69 year-old male with PMH of insulin-dependent DM c/b diabetic foot ulcers s/p amputations of half of left great toe and entire right great toe, PVD, HTN, HLD, GERD, and depression who p/w worsening of a previously diagnosed R third toe wound found to have worsening gangrene requiring amputation of R third toe.    #Right third toe gangrene/ OM/ septic arthritis in setting of PVD in RLE  #2nd MTP septic arthritis   - s/p right third toe ray amputation 12/4/2019   - OR cultures: Gram Stain: Numerous Gram Negative Rods seen per oil power field  - Afebrile on admission. TMax in .0 F. + Leukocytosis (13.2) with neutrophilic predominance. Tylenol PRN  - ESR elevated at 70. CRP pending  - blood cultures negative x 2 , follow up repeat blood cultures s/p OR yesterday as patient spiked a fever   - R foot XR : gangrene R 3rd toe and recurrent OM, worsening R 3rd  MTP septic arthritis and OM; R 2nd MTP worsened septic arthritis    -Vascular surgery (Dr. Espinosa) consult f/u:  discharged with follow-up scheduled with Dr. Espinosa after angiogram revealed moderate R tibial disease  - Antibiotics per ID. Had PICC line placed prior to discharge on last admission (discharged 11/21) : Cefepime 2 g IV q12h, Levofloxacin 750 mg IV q24h, Flagyl 500 mg IV q8h  - Baseline Hgb ~10, Monitor CBC, Transfuse if <7, Keep active Type and Screen  - For Podiatry procedure t    #Insulin-dependent T2DM  - Monitor fingersticks with meals and at bedtime  - Continue Lantus 40 qHS  - Increased Lispro to 15 TID AC    #Dyspnea with fatigue, loss of appetite x3 days - resolved  - Characterizes dyspnea on exertion. Denies orthopnea or PND. No prior history of CHF  - TTE: EF 55-60%, Grade 1 diastolic dysfunction, borderline pulmonary hypertension  - CXR negative     #TYLER -resolved  - Baseline Cr 0.9-1.1     #Hypertension -well controlled  - Takes Benazepril 40mg Daily at home  - Continue Lisinopril 40mg Daily  - Monitor BP    #Hyperlipidemia  - Continue Atorvastatin 40mg qHS    #GERD  - Continue Protonix    #Depression  - Was taking Sertraline 50mg Daily. Per patient and pharmacy, has not been taking medication since August  - Follow-up outpatient to resume meds  - Monitor for any SI/HI while inpatient    #Pending: Podiatry procedure tomorrow 12/10  #Disposition: From home  #Diet: DASH/TLC, NPO at midnight  #GI Prophylaxis: Protonix 40mg PO Daily  #DVT Prophylaxis: Lovenox 40mg SubQ qHS  #Activity: Ambulate as tolerated  #Code Status: Full Code 69 year-old male with PMH of insulin-dependent DM c/b diabetic foot ulcers s/p amputations of half of left great toe and entire right great toe, PVD, HTN, HLD, GERD, and depression who p/w worsening of a previously diagnosed R third toe wound found to have worsening gangrene requiring amputation of R third toe.    #Right third toe gangrene/ OM/ septic arthritis in setting of PVD in RLE  #2nd MTP septic arthritis   - On admission: Afebrile (Tmax 100.0F), WBC 13.2  - ESR 70, CRP 6.93  - Blood cultures negative, OR culture growing enterobacter (R cefepime, zosyn, S fluoro/bactrim)  - XR Right foot: Gangrene right 3rd toe and recurrent osteomyelitis, worsening right 3rd MTP septic arthritis and osteomyelitis, right 2nd MTP worsened septic arthritis  - S/p Amputation of right third toe on 12/4  - Vascular follow up as outpatient with Dr. Espinosa  - Baseline Hgb ~10, Monitor CBC, Transfuse if <7, Keep active Type and Screen  - Patient has PICC line from previous admission  - Per ID, changed Cefepime/Flagyl to Meropenem 1g IV q8H  - Continue Levaquin 750mg IV Daily  - For Podiatry procedure tomorrow 12/10, NPO at midnight    #Insulin-dependent T2DM  - Monitor fingersticks with meals and at bedtime  - Continue Lantus 40 qHS  - Increased Lispro to 15 TID AC    #Dyspnea with fatigue, loss of appetite x3 days - resolved  - Characterizes dyspnea on exertion. Denies orthopnea or PND. No prior history of CHF  - TTE: EF 55-60%, Grade 1 diastolic dysfunction, borderline pulmonary hypertension  - CXR negative     #TYLER -resolved  - Baseline Cr 0.9-1.1     #Hypertension -well controlled  - Takes Benazepril 40mg Daily at home  - Continue Lisinopril 40mg Daily  - Monitor BP    #Hyperlipidemia  - Continue Atorvastatin 40mg qHS    #GERD  - Continue Protonix    #Depression  - Was taking Sertraline 50mg Daily. Per patient and pharmacy, has not been taking medication since August  - Follow-up outpatient to resume meds  - Monitor for any SI/HI while inpatient    #Pending: Podiatry procedure tomorrow 12/10  #Disposition: From home  #Diet: DASH/TLC, NPO at midnight  #GI Prophylaxis: Protonix 40mg PO Daily  #DVT Prophylaxis: Lovenox 40mg SubQ qHS  #Activity: Ambulate as tolerated  #Code Status: Full Code

## 2019-12-10 LAB
ALBUMIN SERPL ELPH-MCNC: 2.9 G/DL — LOW (ref 3.5–5.2)
ALP SERPL-CCNC: 272 U/L — HIGH (ref 30–115)
ALT FLD-CCNC: 7 U/L — SIGNIFICANT CHANGE UP (ref 0–41)
ANION GAP SERPL CALC-SCNC: 14 MMOL/L — SIGNIFICANT CHANGE UP (ref 7–14)
APTT BLD: 37.2 SEC — SIGNIFICANT CHANGE UP (ref 27–39.2)
AST SERPL-CCNC: 14 U/L — SIGNIFICANT CHANGE UP (ref 0–41)
BASOPHILS # BLD AUTO: 0.05 K/UL — SIGNIFICANT CHANGE UP (ref 0–0.2)
BASOPHILS NFR BLD AUTO: 0.7 % — SIGNIFICANT CHANGE UP (ref 0–1)
BILIRUB SERPL-MCNC: 0.3 MG/DL — SIGNIFICANT CHANGE UP (ref 0.2–1.2)
BLD GP AB SCN SERPL QL: SIGNIFICANT CHANGE UP
BUN SERPL-MCNC: 14 MG/DL — SIGNIFICANT CHANGE UP (ref 10–20)
CALCIUM SERPL-MCNC: 8.4 MG/DL — LOW (ref 8.5–10.1)
CHLORIDE SERPL-SCNC: 105 MMOL/L — SIGNIFICANT CHANGE UP (ref 98–110)
CO2 SERPL-SCNC: 22 MMOL/L — SIGNIFICANT CHANGE UP (ref 17–32)
CREAT SERPL-MCNC: 1 MG/DL — SIGNIFICANT CHANGE UP (ref 0.7–1.5)
CULTURE RESULTS: SIGNIFICANT CHANGE UP
EOSINOPHIL # BLD AUTO: 0.1 K/UL — SIGNIFICANT CHANGE UP (ref 0–0.7)
EOSINOPHIL NFR BLD AUTO: 1.5 % — SIGNIFICANT CHANGE UP (ref 0–8)
GLUCOSE BLDC GLUCOMTR-MCNC: 202 MG/DL — HIGH (ref 70–99)
GLUCOSE BLDC GLUCOMTR-MCNC: 209 MG/DL — HIGH (ref 70–99)
GLUCOSE BLDC GLUCOMTR-MCNC: 239 MG/DL — HIGH (ref 70–99)
GLUCOSE BLDC GLUCOMTR-MCNC: 274 MG/DL — HIGH (ref 70–99)
GLUCOSE BLDC GLUCOMTR-MCNC: 321 MG/DL — HIGH (ref 70–99)
GLUCOSE SERPL-MCNC: 229 MG/DL — HIGH (ref 70–99)
HCT VFR BLD CALC: 28.9 % — LOW (ref 42–52)
HGB BLD-MCNC: 9.2 G/DL — LOW (ref 14–18)
IMM GRANULOCYTES NFR BLD AUTO: 1.6 % — HIGH (ref 0.1–0.3)
INR BLD: 1.54 RATIO — HIGH (ref 0.65–1.3)
LYMPHOCYTES # BLD AUTO: 0.76 K/UL — LOW (ref 1.2–3.4)
LYMPHOCYTES # BLD AUTO: 11.2 % — LOW (ref 20.5–51.1)
MAGNESIUM SERPL-MCNC: 1.8 MG/DL — SIGNIFICANT CHANGE UP (ref 1.8–2.4)
MCHC RBC-ENTMCNC: 28.1 PG — SIGNIFICANT CHANGE UP (ref 27–31)
MCHC RBC-ENTMCNC: 31.8 G/DL — LOW (ref 32–37)
MCV RBC AUTO: 88.4 FL — SIGNIFICANT CHANGE UP (ref 80–94)
MONOCYTES # BLD AUTO: 0.65 K/UL — HIGH (ref 0.1–0.6)
MONOCYTES NFR BLD AUTO: 9.6 % — HIGH (ref 1.7–9.3)
NEUTROPHILS # BLD AUTO: 5.11 K/UL — SIGNIFICANT CHANGE UP (ref 1.4–6.5)
NEUTROPHILS NFR BLD AUTO: 75.4 % — HIGH (ref 42.2–75.2)
NRBC # BLD: 0 /100 WBCS — SIGNIFICANT CHANGE UP (ref 0–0)
PLATELET # BLD AUTO: 378 K/UL — SIGNIFICANT CHANGE UP (ref 130–400)
POTASSIUM SERPL-MCNC: 4.7 MMOL/L — SIGNIFICANT CHANGE UP (ref 3.5–5)
POTASSIUM SERPL-SCNC: 4.7 MMOL/L — SIGNIFICANT CHANGE UP (ref 3.5–5)
PROT SERPL-MCNC: 5.8 G/DL — LOW (ref 6–8)
PROTHROM AB SERPL-ACNC: 17.6 SEC — HIGH (ref 9.95–12.87)
RBC # BLD: 3.27 M/UL — LOW (ref 4.7–6.1)
RBC # FLD: 13.6 % — SIGNIFICANT CHANGE UP (ref 11.5–14.5)
SODIUM SERPL-SCNC: 141 MMOL/L — SIGNIFICANT CHANGE UP (ref 135–146)
SPECIMEN SOURCE: SIGNIFICANT CHANGE UP
WBC # BLD: 6.78 K/UL — SIGNIFICANT CHANGE UP (ref 4.8–10.8)
WBC # FLD AUTO: 6.78 K/UL — SIGNIFICANT CHANGE UP (ref 4.8–10.8)

## 2019-12-10 PROCEDURE — 99233 SBSQ HOSP IP/OBS HIGH 50: CPT

## 2019-12-10 RX ORDER — HYDROMORPHONE HYDROCHLORIDE 2 MG/ML
1 INJECTION INTRAMUSCULAR; INTRAVENOUS; SUBCUTANEOUS
Refills: 0 | Status: DISCONTINUED | OUTPATIENT
Start: 2019-12-10 | End: 2019-12-10

## 2019-12-10 RX ORDER — INSULIN LISPRO 100/ML
15 VIAL (ML) SUBCUTANEOUS
Refills: 0 | Status: DISCONTINUED | OUTPATIENT
Start: 2019-12-10 | End: 2019-12-11

## 2019-12-10 RX ORDER — INSULIN LISPRO 100/ML
VIAL (ML) SUBCUTANEOUS
Refills: 0 | Status: DISCONTINUED | OUTPATIENT
Start: 2019-12-10 | End: 2019-12-10

## 2019-12-10 RX ORDER — ASPIRIN/CALCIUM CARB/MAGNESIUM 324 MG
81 TABLET ORAL DAILY
Refills: 0 | Status: DISCONTINUED | OUTPATIENT
Start: 2019-12-10 | End: 2019-12-11

## 2019-12-10 RX ORDER — PANTOPRAZOLE SODIUM 20 MG/1
40 TABLET, DELAYED RELEASE ORAL
Refills: 0 | Status: DISCONTINUED | OUTPATIENT
Start: 2019-12-10 | End: 2019-12-11

## 2019-12-10 RX ORDER — ACETAMINOPHEN 500 MG
650 TABLET ORAL EVERY 6 HOURS
Refills: 0 | Status: DISCONTINUED | OUTPATIENT
Start: 2019-12-10 | End: 2019-12-11

## 2019-12-10 RX ORDER — INSULIN GLARGINE 100 [IU]/ML
40 INJECTION, SOLUTION SUBCUTANEOUS AT BEDTIME
Refills: 0 | Status: DISCONTINUED | OUTPATIENT
Start: 2019-12-10 | End: 2019-12-10

## 2019-12-10 RX ORDER — ATORVASTATIN CALCIUM 80 MG/1
40 TABLET, FILM COATED ORAL AT BEDTIME
Refills: 0 | Status: DISCONTINUED | OUTPATIENT
Start: 2019-12-10 | End: 2019-12-11

## 2019-12-10 RX ORDER — INSULIN LISPRO 100/ML
VIAL (ML) SUBCUTANEOUS
Refills: 0 | Status: DISCONTINUED | OUTPATIENT
Start: 2019-12-10 | End: 2019-12-11

## 2019-12-10 RX ORDER — INSULIN GLARGINE 100 [IU]/ML
50 INJECTION, SOLUTION SUBCUTANEOUS AT BEDTIME
Refills: 0 | Status: DISCONTINUED | OUTPATIENT
Start: 2019-12-10 | End: 2019-12-11

## 2019-12-10 RX ORDER — NIFEDIPINE 30 MG
60 TABLET, EXTENDED RELEASE 24 HR ORAL DAILY
Refills: 0 | Status: DISCONTINUED | OUTPATIENT
Start: 2019-12-10 | End: 2019-12-11

## 2019-12-10 RX ORDER — MEROPENEM 1 G/30ML
1000 INJECTION INTRAVENOUS EVERY 8 HOURS
Refills: 0 | Status: DISCONTINUED | OUTPATIENT
Start: 2019-12-10 | End: 2019-12-11

## 2019-12-10 RX ORDER — INSULIN LISPRO 100/ML
5 VIAL (ML) SUBCUTANEOUS ONCE
Refills: 0 | Status: COMPLETED | OUTPATIENT
Start: 2019-12-10 | End: 2019-12-10

## 2019-12-10 RX ORDER — LISINOPRIL 2.5 MG/1
40 TABLET ORAL DAILY
Refills: 0 | Status: DISCONTINUED | OUTPATIENT
Start: 2019-12-10 | End: 2019-12-11

## 2019-12-10 RX ORDER — ENOXAPARIN SODIUM 100 MG/ML
40 INJECTION SUBCUTANEOUS DAILY
Refills: 0 | Status: DISCONTINUED | OUTPATIENT
Start: 2019-12-10 | End: 2019-12-10

## 2019-12-10 RX ORDER — HYDROMORPHONE HYDROCHLORIDE 2 MG/ML
0.5 INJECTION INTRAMUSCULAR; INTRAVENOUS; SUBCUTANEOUS
Refills: 0 | Status: DISCONTINUED | OUTPATIENT
Start: 2019-12-10 | End: 2019-12-10

## 2019-12-10 RX ORDER — SODIUM CHLORIDE 9 MG/ML
1000 INJECTION, SOLUTION INTRAVENOUS
Refills: 0 | Status: DISCONTINUED | OUTPATIENT
Start: 2019-12-10 | End: 2019-12-10

## 2019-12-10 RX ADMIN — MEROPENEM 100 MILLIGRAM(S): 1 INJECTION INTRAVENOUS at 21:26

## 2019-12-10 RX ADMIN — INSULIN GLARGINE 50 UNIT(S): 100 INJECTION, SOLUTION SUBCUTANEOUS at 22:30

## 2019-12-10 RX ADMIN — ATORVASTATIN CALCIUM 40 MILLIGRAM(S): 80 TABLET, FILM COATED ORAL at 21:26

## 2019-12-10 RX ADMIN — PANTOPRAZOLE SODIUM 40 MILLIGRAM(S): 20 TABLET, DELAYED RELEASE ORAL at 05:13

## 2019-12-10 RX ADMIN — Medication 5 UNIT(S): at 09:46

## 2019-12-10 RX ADMIN — LISINOPRIL 40 MILLIGRAM(S): 2.5 TABLET ORAL at 05:13

## 2019-12-10 RX ADMIN — Medication 2: at 12:02

## 2019-12-10 RX ADMIN — Medication 60 MILLIGRAM(S): at 05:13

## 2019-12-10 RX ADMIN — SODIUM CHLORIDE 100 MILLILITER(S): 9 INJECTION, SOLUTION INTRAVENOUS at 08:09

## 2019-12-10 RX ADMIN — Medication 15 UNIT(S): at 17:23

## 2019-12-10 RX ADMIN — SODIUM CHLORIDE 100 MILLILITER(S): 9 INJECTION, SOLUTION INTRAVENOUS at 10:45

## 2019-12-10 RX ADMIN — MEROPENEM 100 MILLIGRAM(S): 1 INJECTION INTRAVENOUS at 05:13

## 2019-12-10 RX ADMIN — MUPIROCIN 1 APPLICATION(S): 20 OINTMENT TOPICAL at 05:13

## 2019-12-10 RX ADMIN — CHLORHEXIDINE GLUCONATE 1 APPLICATION(S): 213 SOLUTION TOPICAL at 05:11

## 2019-12-10 RX ADMIN — Medication 4: at 17:23

## 2019-12-10 RX ADMIN — MEROPENEM 100 MILLIGRAM(S): 1 INJECTION INTRAVENOUS at 13:51

## 2019-12-10 RX ADMIN — Medication 81 MILLIGRAM(S): at 11:07

## 2019-12-10 NOTE — PROGRESS NOTE ADULT - ASSESSMENT
69 year-old male with PMH of insulin-dependent DM c/b diabetic foot ulcers s/p amputations of half of left great toe and entire right great toe, PVD, HTN, HLD, GERD, and depression who p/w worsening of a previously diagnosed R third toe wound found to have worsening gangrene requiring amputation of R third toe.    #Right third toe gangrene/ OM/ septic arthritis in setting of PVD in RLE  #2nd MTP septic arthritis   - On admission: Afebrile (Tmax 100.0F), WBC 13.2  - ESR 70, CRP 6.93  - Blood cultures negative, OR culture growing enterobacter (R cefepime, zosyn, S fluoro/bactrim)  - XR Right foot: Gangrene right 3rd toe and recurrent osteomyelitis, worsening right 3rd MTP septic arthritis and osteomyelitis, right 2nd MTP worsened septic arthritis  - S/p Amputation of right third toe on 12/4  - Vascular follow up as outpatient with Dr. Espinosa  - Baseline Hgb ~10, Monitor CBC, Transfuse if <7, Keep active Type and Screen  - Patient has PICC line from previous admission  - Continue Levaquin 750mg IV Daily  - Continue Meropenem 1g IV q8H  - Podiatry procedure today    #Insulin-dependent T2DM  - Monitor fingersticks with meals and at bedtime  - Continue Lantus 40 qHS, Lispro 15 TID AC    #Dyspnea with fatigue, loss of appetite x3 days - resolved  - Characterizes dyspnea on exertion. Denies orthopnea or PND. No prior history of CHF  - TTE: EF 55-60%, Grade 1 diastolic dysfunction, borderline pulmonary hypertension  - CXR negative     #TYLER -resolved  - Baseline Cr 0.9-1.1     #Hypertension -well controlled  - Takes Benazepril 40mg Daily at home  - Continue Lisinopril 40mg Daily  - Monitor BP    #Hyperlipidemia  - Continue Atorvastatin 40mg qHS    #GERD  - Continue Protonix    #Depression  - Was taking Sertraline 50mg Daily. Per patient and pharmacy, has not been taking medication since August  - Follow-up outpatient to resume meds  - Monitor for any SI/HI while inpatient    #Pending: Podiatry procedure today  #Disposition: From home  #Diet: DASH/TLC  #GI Prophylaxis: Protonix 40mg PO Daily  #DVT Prophylaxis: Lovenox 40mg SubQ qHS  #Activity: Ambulate as tolerated  #Code Status: Full Code

## 2019-12-10 NOTE — PROGRESS NOTE ADULT - SUBJECTIVE AND OBJECTIVE BOX
Patient Information:  JOSÉ MANUEL PORTER / 69y / Male / MRN#:[id number]    Hospital Day: 7d    HPI:  69 year-old male with PMH of insulin-dependent DM c/b diabetic foot ulcers s/p amputations of half of left great toe and entire right great toe, PVD, HTN, HLD, GERD, and depression who presents with worsening of a previously diagnosed R third toe wound. Patient was recently admitted to [facility] from 11/14-11/21 for worsening of the same diabetic foot ulcer of right foot with OM with cultures growing MSSA; patient had PICC line placed prior to discharge and was receiving PO Flagyl 500 mg BID and IV Rocephin 2 g qD with scheduled ID (Dr. Gary Turpin) follow-up.    Today, patient presents with mild pain and numbness in right third toe. Per patient and wife at bedside, he had appointment with Vascular Surgery scheduled for today but decided to come to the ED due to worsening appearance of toe (was starting to appear darker) in addition to feeling short of breath, fatigue, and loss of appetite for past three days. + Pain in R foot, + loss of appetite, + fatigue, + shortness of breath x3 days. Denies fever/chills/HA/change in vision/rhinorrhea/sore throat/cough/chest pain/abdominal pain/constipation/diarrhea.    Interval History:  Patient seen and examined at bedside. No acute events overnight.    Past Medical History:  GERD (gastroesophageal reflux disease)  Depression  Diabetic foot ulcer  Dyslipidemia  HTN (hypertension)  DM (diabetes mellitus)    Past Surgical History:  Toe amputation status, right    Allergies:  No Known Allergies    Medications:  PRN:    Standing:    Home:  benazepril 40 mg oral tablet: 1 tab(s) orally once a day  Crestor 10 mg oral tablet: 1 tab(s) orally once a day (at bedtime)  HumuLIN 70/30 KwikPen 70 units-30 units/mL subcutaneous suspension: 50 unit(s) subcutaneous once a day before breakfast  HumuLIN 70/30 subcutaneous suspension: 40 unit(s) subcutaneous once a day (at bedtime)  Janumet 50 mg-1000 mg oral tablet: 1 tab(s) orally 2 times a day  omeprazole 20 mg oral delayed release capsule: 1 cap(s) orally once a day    Vitals:  T(C): 36.7, Max: 37 (12-09-19 @ 21:00)  T(F): 98.1, Max: 98.6 (12-09-19 @ 21:00)  HR: 91 (86 - 91)  BP: 137/60 (127/60 - 143/69)  RR: 16 (16 - 18)  SpO2: 96% (96% - 96%)    Physical Exam:  General: Awake, Alert. Not in acute distress.  Heart: Regular rate and rhythm; S1, S2; No murmurs.  Lungs: Clear to auscultation bilaterally.  Abdomen: Soft, nontender, nondistended.  Neuro: AAOx3, No focal deficits.    Labs:  CBC (12-10 @ 05:26)                        Hgb: 9.2    WBC: 6.78  )-----------------( Plts: 378                              Hct: 28.9     Chem (12-09 @ 05:41)  Na: 141  |     Cl: 105     |  BUN: 15  -----------------------------------------< Gluc: 168    K: 4.5   |    HCO3: 24  |  Cr: 1.0    Ca 8.2 (12-09 @ 05:41)  Mg 1.7 (12-09 @ 05:41)    LFTs (12-09 @ 05:41)  TPro 6.0  /  Alb 2.8  TBili 0.3  /  DBili     AST 12  /  ALT 6   /  AlkPhos 234    PT/INR (12-10 @ 05:26)  PT: 17.60; INR: 1.54   PTT: 37.2      Microbiology:  Culture - Blood (collected 12-04 @ 22:04)  Source: .Blood None  Final Report (12-10 @ 02:11):    No growth at 5 days.    Culture - Tissue with Gram Stain (collected 12-04 @ 21:00)  Source: .Tissue None  Gram Stain (12-05 @ 12:57):    Numerous Gram Negative Rods seen per oil power field    No polymorphonuclear leukocytes seen per low power field  Preliminary Report (12-08 @ 08:38):    Numerous Enterobacter cloacae complex    Few Corynebacterium species "Susceptibilities not performed"  Organism: Enterobacter cloacae complex (12-07 @ 09:10)  Organism: Enterobacter cloacae complex (12-07 @ 09:10)    Culture - Fungal, Other (collected 12-04 @ 21:00)  Source: .Other None  Preliminary Report (12-06 @ 06:24):    Testing in progress    Culture - Surgical Swab (collected 12-04 @ 21:00)  Source: .Surgical Swab None  Preliminary Report (12-07 @ 09:46):    Numerous Enterobacter cloacae complex    Numerous Stenotrophomonas maltophilia    Few Corynebacterium species "Susceptibilities not performed"  Organism: Enterobacter cloacae complex  Stenotrophomonas maltophilia (12-07 @ 09:24)  Organism: Stenotrophomonas maltophilia (12-07 @ 09:24)  Organism: Enterobacter cloacae complex (12-07 @ 09:23)    Culture - Abscess with Gram Stain (collected 12-04 @ 14:20)  Source: .Abscess toe left  Final Report (12-07 @ 18:01):    Culture yields >4 types of aerobic and/or anaerobic bacteria    Call client services within 7 days if further workup is clinically    indicated.    Culture includes    Numerous Stenotrophomonas maltophilia    Numerous Pseudomonas aeruginosa  Organism: Stenotrophomonas maltophilia  Pseudomonas aeruginosa (12-07 @ 18:01)  Organism: Pseudomonas aeruginosa (12-07 @ 18:01)  Organism: Stenotrophomonas maltophilia (12-07 @ 18:01)    Culture - Blood (collected 12-03 @ 16:27)  Source: .Blood None  Final Report (12-09 @ 02:14):    No growth at 5 days.

## 2019-12-10 NOTE — CHART NOTE - NSCHARTNOTEFT_GEN_A_CORE
PACU ANESTHESIA ADMISSION NOTE      Procedure: Ray amputation of third toe of right foot    Post op diagnosis:  Abscess  Gangrene of toe of right foot      ____  Intubated  TV:______       Rate: ______      FiO2: ______    ___X_  Patent Airway    ___X_  Full return of protective reflexes    ___X_  Full recovery from anesthesia / back to baseline     Vitals:   T:           R:                  BP:                  Sat:                   P:   SEE ANESTHESIA  RECORD    Mental Status:  ___X_ Awake   __X___ Alert   _____ Drowsy   _____ Sedated    Nausea/Vomiting:  ____ NO  ___X___Yes,   See Post - Op Orders          Pain Scale (0-10):  _____    Treatment: ____ None    ____ See Post - Op/PCA Orders    Post - Operative Fluids:   ____ Oral   ___C_ See Post - Op Orders    Plan: Discharge:   ____Home       ____X_Floor     _____Critical Care    _____  Other:_________________    Comments: CASE ABORTED, AT OR PT VOMITED 250 CC OF CLEAR FLUIDS , SUCTIONED , PT HAD INTACT COUGH REFLEX AT THE TIME, D/W SURGEON , CASE ELECTED TO BE ABORTED, FOR THE RISK OF ASPIRATION

## 2019-12-10 NOTE — CHART NOTE - NSCHARTNOTEFT_GEN_A_CORE
Pt going to OR for Toe amputation Tomorrow afternoon/Early evening.  NPO @MN  Pt aware of the procedure

## 2019-12-11 ENCOUNTER — RESULT REVIEW (OUTPATIENT)
Age: 69
End: 2019-12-11

## 2019-12-11 LAB
ALBUMIN SERPL ELPH-MCNC: 3.1 G/DL — LOW (ref 3.5–5.2)
ALP SERPL-CCNC: 286 U/L — HIGH (ref 30–115)
ALT FLD-CCNC: 8 U/L — SIGNIFICANT CHANGE UP (ref 0–41)
ANION GAP SERPL CALC-SCNC: 14 MMOL/L — SIGNIFICANT CHANGE UP (ref 7–14)
AST SERPL-CCNC: 13 U/L — SIGNIFICANT CHANGE UP (ref 0–41)
BASOPHILS # BLD AUTO: 0.03 K/UL — SIGNIFICANT CHANGE UP (ref 0–0.2)
BASOPHILS NFR BLD AUTO: 0.4 % — SIGNIFICANT CHANGE UP (ref 0–1)
BILIRUB SERPL-MCNC: 0.2 MG/DL — SIGNIFICANT CHANGE UP (ref 0.2–1.2)
BUN SERPL-MCNC: 15 MG/DL — SIGNIFICANT CHANGE UP (ref 10–20)
CALCIUM SERPL-MCNC: 8.8 MG/DL — SIGNIFICANT CHANGE UP (ref 8.5–10.1)
CHLORIDE SERPL-SCNC: 104 MMOL/L — SIGNIFICANT CHANGE UP (ref 98–110)
CO2 SERPL-SCNC: 24 MMOL/L — SIGNIFICANT CHANGE UP (ref 17–32)
CREAT SERPL-MCNC: 0.9 MG/DL — SIGNIFICANT CHANGE UP (ref 0.7–1.5)
EOSINOPHIL # BLD AUTO: 0.1 K/UL — SIGNIFICANT CHANGE UP (ref 0–0.7)
EOSINOPHIL NFR BLD AUTO: 1.3 % — SIGNIFICANT CHANGE UP (ref 0–8)
GGT SERPL-CCNC: 402 U/L — HIGH (ref 1–40)
GLUCOSE BLDC GLUCOMTR-MCNC: 130 MG/DL — HIGH (ref 70–99)
GLUCOSE BLDC GLUCOMTR-MCNC: 157 MG/DL — HIGH (ref 70–99)
GLUCOSE BLDC GLUCOMTR-MCNC: 157 MG/DL — HIGH (ref 70–99)
GLUCOSE SERPL-MCNC: 153 MG/DL — HIGH (ref 70–99)
HCT VFR BLD CALC: 31.2 % — LOW (ref 42–52)
HGB BLD-MCNC: 9.6 G/DL — LOW (ref 14–18)
IMM GRANULOCYTES NFR BLD AUTO: 2.6 % — HIGH (ref 0.1–0.3)
LYMPHOCYTES # BLD AUTO: 1.02 K/UL — LOW (ref 1.2–3.4)
LYMPHOCYTES # BLD AUTO: 13.4 % — LOW (ref 20.5–51.1)
MAGNESIUM SERPL-MCNC: 1.7 MG/DL — LOW (ref 1.8–2.4)
MCHC RBC-ENTMCNC: 27.7 PG — SIGNIFICANT CHANGE UP (ref 27–31)
MCHC RBC-ENTMCNC: 30.8 G/DL — LOW (ref 32–37)
MCV RBC AUTO: 89.9 FL — SIGNIFICANT CHANGE UP (ref 80–94)
MONOCYTES # BLD AUTO: 0.57 K/UL — SIGNIFICANT CHANGE UP (ref 0.1–0.6)
MONOCYTES NFR BLD AUTO: 7.5 % — SIGNIFICANT CHANGE UP (ref 1.7–9.3)
NEUTROPHILS # BLD AUTO: 5.71 K/UL — SIGNIFICANT CHANGE UP (ref 1.4–6.5)
NEUTROPHILS NFR BLD AUTO: 74.8 % — SIGNIFICANT CHANGE UP (ref 42.2–75.2)
NRBC # BLD: 0 /100 WBCS — SIGNIFICANT CHANGE UP (ref 0–0)
PLATELET # BLD AUTO: 448 K/UL — HIGH (ref 130–400)
POTASSIUM SERPL-MCNC: 4.8 MMOL/L — SIGNIFICANT CHANGE UP (ref 3.5–5)
POTASSIUM SERPL-SCNC: 4.8 MMOL/L — SIGNIFICANT CHANGE UP (ref 3.5–5)
PROT SERPL-MCNC: 6.7 G/DL — SIGNIFICANT CHANGE UP (ref 6–8)
RBC # BLD: 3.47 M/UL — LOW (ref 4.7–6.1)
RBC # FLD: 13.8 % — SIGNIFICANT CHANGE UP (ref 11.5–14.5)
SODIUM SERPL-SCNC: 142 MMOL/L — SIGNIFICANT CHANGE UP (ref 135–146)
WBC # BLD: 7.63 K/UL — SIGNIFICANT CHANGE UP (ref 4.8–10.8)
WBC # FLD AUTO: 7.63 K/UL — SIGNIFICANT CHANGE UP (ref 4.8–10.8)

## 2019-12-11 PROCEDURE — 88305 TISSUE EXAM BY PATHOLOGIST: CPT | Mod: 26

## 2019-12-11 PROCEDURE — 99233 SBSQ HOSP IP/OBS HIGH 50: CPT

## 2019-12-11 PROCEDURE — 73630 X-RAY EXAM OF FOOT: CPT | Mod: 26,RT

## 2019-12-11 PROCEDURE — 13160 SEC CLSR SURG WND/DEHSN XTN: CPT | Mod: 59,58

## 2019-12-11 PROCEDURE — 88302 TISSUE EXAM BY PATHOLOGIST: CPT | Mod: 26

## 2019-12-11 PROCEDURE — 28810 AMPUTATION TOE & METATARSAL: CPT | Mod: 58

## 2019-12-11 RX ORDER — MAGNESIUM SULFATE 500 MG/ML
2 VIAL (ML) INJECTION ONCE
Refills: 0 | Status: COMPLETED | OUTPATIENT
Start: 2019-12-11 | End: 2019-12-11

## 2019-12-11 RX ORDER — INSULIN LISPRO 100/ML
15 VIAL (ML) SUBCUTANEOUS
Refills: 0 | Status: DISCONTINUED | OUTPATIENT
Start: 2019-12-11 | End: 2019-12-17

## 2019-12-11 RX ORDER — LISINOPRIL 2.5 MG/1
40 TABLET ORAL DAILY
Refills: 0 | Status: DISCONTINUED | OUTPATIENT
Start: 2019-12-11 | End: 2019-12-17

## 2019-12-11 RX ORDER — ATORVASTATIN CALCIUM 80 MG/1
40 TABLET, FILM COATED ORAL AT BEDTIME
Refills: 0 | Status: DISCONTINUED | OUTPATIENT
Start: 2019-12-11 | End: 2019-12-17

## 2019-12-11 RX ORDER — SODIUM CHLORIDE 9 MG/ML
1000 INJECTION, SOLUTION INTRAVENOUS
Refills: 0 | Status: DISCONTINUED | OUTPATIENT
Start: 2019-12-11 | End: 2019-12-11

## 2019-12-11 RX ORDER — INSULIN GLARGINE 100 [IU]/ML
50 INJECTION, SOLUTION SUBCUTANEOUS AT BEDTIME
Refills: 0 | Status: DISCONTINUED | OUTPATIENT
Start: 2019-12-11 | End: 2019-12-13

## 2019-12-11 RX ORDER — OXYCODONE AND ACETAMINOPHEN 5; 325 MG/1; MG/1
1 TABLET ORAL ONCE
Refills: 0 | Status: DISCONTINUED | OUTPATIENT
Start: 2019-12-11 | End: 2019-12-11

## 2019-12-11 RX ORDER — MORPHINE SULFATE 50 MG/1
2 CAPSULE, EXTENDED RELEASE ORAL
Refills: 0 | Status: DISCONTINUED | OUTPATIENT
Start: 2019-12-11 | End: 2019-12-11

## 2019-12-11 RX ORDER — ASPIRIN/CALCIUM CARB/MAGNESIUM 324 MG
81 TABLET ORAL DAILY
Refills: 0 | Status: DISCONTINUED | OUTPATIENT
Start: 2019-12-11 | End: 2019-12-17

## 2019-12-11 RX ORDER — NIFEDIPINE 30 MG
60 TABLET, EXTENDED RELEASE 24 HR ORAL DAILY
Refills: 0 | Status: DISCONTINUED | OUTPATIENT
Start: 2019-12-11 | End: 2019-12-17

## 2019-12-11 RX ORDER — ACETAMINOPHEN 500 MG
650 TABLET ORAL EVERY 6 HOURS
Refills: 0 | Status: DISCONTINUED | OUTPATIENT
Start: 2019-12-11 | End: 2019-12-13

## 2019-12-11 RX ORDER — PANTOPRAZOLE SODIUM 20 MG/1
40 TABLET, DELAYED RELEASE ORAL
Refills: 0 | Status: DISCONTINUED | OUTPATIENT
Start: 2019-12-11 | End: 2019-12-17

## 2019-12-11 RX ORDER — INSULIN LISPRO 100/ML
VIAL (ML) SUBCUTANEOUS
Refills: 0 | Status: DISCONTINUED | OUTPATIENT
Start: 2019-12-11 | End: 2019-12-13

## 2019-12-11 RX ORDER — MEROPENEM 1 G/30ML
1000 INJECTION INTRAVENOUS EVERY 8 HOURS
Refills: 0 | Status: DISCONTINUED | OUTPATIENT
Start: 2019-12-11 | End: 2019-12-17

## 2019-12-11 RX ADMIN — LISINOPRIL 40 MILLIGRAM(S): 2.5 TABLET ORAL at 05:47

## 2019-12-11 RX ADMIN — INSULIN GLARGINE 50 UNIT(S): 100 INJECTION, SOLUTION SUBCUTANEOUS at 21:55

## 2019-12-11 RX ADMIN — MEROPENEM 100 MILLIGRAM(S): 1 INJECTION INTRAVENOUS at 21:57

## 2019-12-11 RX ADMIN — Medication 81 MILLIGRAM(S): at 13:27

## 2019-12-11 RX ADMIN — PANTOPRAZOLE SODIUM 40 MILLIGRAM(S): 20 TABLET, DELAYED RELEASE ORAL at 06:28

## 2019-12-11 RX ADMIN — Medication 300 UNIT(S): at 23:14

## 2019-12-11 RX ADMIN — MEROPENEM 100 MILLIGRAM(S): 1 INJECTION INTRAVENOUS at 13:40

## 2019-12-11 RX ADMIN — SODIUM CHLORIDE 75 MILLILITER(S): 9 INJECTION, SOLUTION INTRAVENOUS at 19:05

## 2019-12-11 RX ADMIN — MEROPENEM 100 MILLIGRAM(S): 1 INJECTION INTRAVENOUS at 06:28

## 2019-12-11 RX ADMIN — Medication 60 MILLIGRAM(S): at 05:47

## 2019-12-11 RX ADMIN — Medication 50 GRAM(S): at 13:27

## 2019-12-11 RX ADMIN — ATORVASTATIN CALCIUM 40 MILLIGRAM(S): 80 TABLET, FILM COATED ORAL at 21:51

## 2019-12-11 NOTE — DIETITIAN INITIAL EVALUATION ADULT. - ADD RECOMMEND
Recommendation: (1) Add Consistent Carbohydrate diet modifier. (2) Consider adding a bowel regimen as pt c/o constipation at this time.

## 2019-12-11 NOTE — PROGRESS NOTE ADULT - SUBJECTIVE AND OBJECTIVE BOX
Patient Information:  JOSÉ MANUEL PORTER / 69y / Male / MRN#:[id number]    Hospital Day: 8d    HPI:  69 year-old male with PMH of insulin-dependent DM c/b diabetic foot ulcers s/p amputations of half of left great toe and entire right great toe, PVD, HTN, HLD, GERD, and depression who presents with worsening of a previously diagnosed R third toe wound. Patient was recently admitted to [facility] from 11/14-11/21 for worsening of the same diabetic foot ulcer of right foot with OM with cultures growing MSSA; patient had PICC line placed prior to discharge and was receiving PO Flagyl 500 mg BID and IV Rocephin 2 g qD with scheduled ID (Dr. Gary Turpin) follow-up.    Today, patient presents with mild pain and numbness in right third toe. Per patient and wife at bedside, he had appointment with Vascular Surgery scheduled for today but decided to come to the ED due to worsening appearance of toe (was starting to appear darker) in addition to feeling short of breath, fatigue, and loss of appetite for past three days. + Pain in R foot, + loss of appetite, + fatigue, + shortness of breath x3 days. Denies fever/chills/HA/change in vision/rhinorrhea/sore throat/cough/chest pain/abdominal pain/constipation/diarrhea.    Interval History:  Patient seen and examined at bedside. No acute events overnight.    Past Medical History:  GERD (gastroesophageal reflux disease)  Depression  Diabetic foot ulcer  Dyslipidemia  HTN (hypertension)  DM (diabetes mellitus)    Past Surgical History:  Toe amputation status, right    Allergies:  No Known Allergies    Medications:  PRN:  acetaminophen   Tablet .. 650 milliGRAM(s) Oral every 6 hours PRN Temp greater or equal to 38C (100.4F)    Standing:  aspirin enteric coated 81 milliGRAM(s) Oral daily  atorvastatin 40 milliGRAM(s) Oral at bedtime  insulin glargine Injectable (LANTUS) 50 Unit(s) SubCutaneous at bedtime  insulin lispro (HumaLOG) corrective regimen sliding scale   SubCutaneous three times a day before meals  insulin lispro Injectable (HumaLOG) 15 Unit(s) SubCutaneous three times a day before meals  levoFLOXacin IVPB      levoFLOXacin IVPB 750 milliGRAM(s) IV Intermittent every 24 hours  lisinopril 40 milliGRAM(s) Oral daily  magnesium sulfate  IVPB 2 Gram(s) IV Intermittent once  meropenem  IVPB 1000 milliGRAM(s) IV Intermittent every 8 hours  NIFEdipine XL 60 milliGRAM(s) Oral daily  pantoprazole    Tablet 40 milliGRAM(s) Oral before breakfast    Home:  benazepril 40 mg oral tablet: 1 tab(s) orally once a day  Crestor 10 mg oral tablet: 1 tab(s) orally once a day (at bedtime)  HumuLIN 70/30 KwikPen 70 units-30 units/mL subcutaneous suspension: 50 unit(s) subcutaneous once a day before breakfast  HumuLIN 70/30 subcutaneous suspension: 40 unit(s) subcutaneous once a day (at bedtime)  Janumet 50 mg-1000 mg oral tablet: 1 tab(s) orally 2 times a day  omeprazole 20 mg oral delayed release capsule: 1 cap(s) orally once a day    Vitals:  T(C): 36.8, Max: 37.2 (12-10-19 @ 21:00)  T(F): 98.3, Max: 99 (12-10-19 @ 21:00)  HR: 82 (82 - 95)  BP: 129/62 (129/62 - 134/66)  RR: 18 (18 - 18)  SpO2: 100% (100% - 100%)    Physical Exam:  General: Awake, Alert. Not in acute distress.  Heart: Regular rate and rhythm; S1, S2; No murmurs.  Lungs: Clear to auscultation bilaterally.  Abdomen: Soft, nontender, nondistended.  Extremities: No edema in upper or lower extremities.  Neuro: AAOx3, No focal deficits.    Labs:  CBC (12-11 @ 06:27)                        Hgb: 9.6    WBC: 7.63  )-----------------( Plts: 448                              Hct: 31.2     Chem (12-11 @ 06:27)  Na: 142  |     Cl: 104     |  BUN: 15  -----------------------------------------< Gluc: 153    K: 4.8   |    HCO3: 24  |  Cr: 0.9    Ca 8.8 (12-11 @ 06:27)  Mg 1.7 (12-11 @ 06:27)    LFTs (12-11 @ 06:27)  TPro 6.7  /  Alb 3.1  TBili 0.2  /  DBili     AST 13  /  ALT 8   /  AlkPhos 286    PT/INR (12-10 @ 05:26)  PT: 17.60; INR: 1.54   PTT: 37.2      Microbiology:  Culture - Blood (collected 12-04 @ 22:04)  Source: .Blood None  Final Report (12-10 @ 02:11):    No growth at 5 days.    Culture - Tissue with Gram Stain (collected 12-04 @ 21:00)  Source: .Tissue None  Gram Stain (12-05 @ 12:57):    Numerous Gram Negative Rods seen per oil power field    No polymorphonuclear leukocytes seen per low power field  Preliminary Report (12-08 @ 08:38):    Numerous Enterobacter cloacae complex    Few Corynebacterium species "Susceptibilities not performed"  Organism: Enterobacter cloacae complex (12-07 @ 09:10)  Organism: Enterobacter cloacae complex (12-07 @ 09:10)    Culture - Fungal, Other (collected 12-04 @ 21:00)  Source: .Other None  Preliminary Report (12-06 @ 06:24):    Testing in progress    Culture - Surgical Swab (collected 12-04 @ 21:00)  Source: .Surgical Swab None  Preliminary Report (12-07 @ 09:46):    Numerous Enterobacter cloacae complex    Numerous Stenotrophomonas maltophilia    Few Corynebacterium species "Susceptibilities not performed"  Organism: Enterobacter cloacae complex  Stenotrophomonas maltophilia (12-07 @ 09:24)  Organism: Stenotrophomonas maltophilia (12-07 @ 09:24)  Organism: Enterobacter cloacae complex (12-07 @ 09:23)    Culture - Abscess with Gram Stain (collected 12-04 @ 14:20)  Source: .Abscess toe left  Final Report (12-07 @ 18:01):    Culture yields >4 types of aerobic and/or anaerobic bacteria    Call client services within 7 days if further workup is clinically    indicated.    Culture includes    Numerous Stenotrophomonas maltophilia    Numerous Pseudomonas aeruginosa  Organism: Stenotrophomonas maltophilia  Pseudomonas aeruginosa (12-07 @ 18:01)  Organism: Pseudomonas aeruginosa (12-07 @ 18:01)  Organism: Stenotrophomonas maltophilia (12-07 @ 18:01)

## 2019-12-11 NOTE — DIETITIAN INITIAL EVALUATION ADULT. - PERTINENT LABORATORY DATA
12/11: RBC-3.47, H/H-9.6/31.2, gluc-153, Mg-1.7, POCT gluc-157 (12:03) vs. 157 (8:11); per previous admit records: 11/15/19 [id number]%

## 2019-12-11 NOTE — PRE-ANESTHESIA EVALUATION ADULT - NSPROPOSEDPROCEDFT_GEN_ALL_CORE
2,3,4 th toe amputation
3rd digit right toe resection
second, fourth, fifth ray amputation possible closure Rt foot

## 2019-12-11 NOTE — PROGRESS NOTE ADULT - ASSESSMENT
69 year-old male with PMH of insulin-dependent DM c/b diabetic foot ulcers s/p amputations of half of left great toe and entire right great toe, PVD, HTN, HLD, GERD, and depression who p/w worsening of a previously diagnosed R third toe wound found to have worsening gangrene requiring amputation of R third toe.    #Right third toe gangrene/ OM/ septic arthritis in setting of PVD in RLE  #2nd MTP septic arthritis   - On admission: Afebrile (Tmax 100.0F), WBC 13.2  - ESR 70, CRP 6.93  - Blood cultures negative, OR culture growing enterobacter (R cefepime, zosyn, S fluoro/bactrim)  - XR Right foot: Gangrene right 3rd toe and recurrent osteomyelitis, worsening right 3rd MTP septic arthritis and osteomyelitis, right 2nd MTP worsened septic arthritis  - S/p Amputation of right third toe on 12/4  - Vascular follow up as outpatient with Dr. Espinosa  - Baseline Hgb ~10, Monitor CBC, Transfuse if <7, Keep active Type and Screen  - Patient has PICC line from previous admission  - Continue Levaquin 750mg IV Daily  - Continue Meropenem 1g IV q8H  - Podiatry procedure today    #Insulin-dependent T2DM  - Monitor fingersticks with meals and at bedtime  - Continue Lantus 50 qHS, Lispro 15 TID AC, Sliding scale x2    #Dyspnea with fatigue, loss of appetite x3 days - resolved  - Characterizes dyspnea on exertion. Denies orthopnea or PND. No prior history of CHF  - TTE: EF 55-60%, Grade 1 diastolic dysfunction, borderline pulmonary hypertension  - CXR negative     #TYLER -resolved  - Baseline Cr 0.9-1.1     #Hypertension -well controlled  - Takes Benazepril 40mg Daily at home  - Continue Lisinopril 40mg Daily  - Monitor BP    #Hyperlipidemia  - Continue Atorvastatin 40mg qHS    #GERD  - Continue Protonix    #Depression  - Was taking Sertraline 50mg Daily. Per patient and pharmacy, has not been taking medication since August  - Follow-up outpatient to resume meds  - Monitor for any SI/HI while inpatient    #Pending: Podiatry procedure today  #Disposition: From home  #Diet: DASH/TLC  #GI Prophylaxis: Protonix 40mg PO Daily  #DVT Prophylaxis: Lovenox 40mg SubQ qHS  #Activity: Ambulate as tolerated  #Code Status: Full Code

## 2019-12-11 NOTE — DIETITIAN INITIAL EVALUATION ADULT. - PHYSICAL APPEARANCE
BMI: 29.9. Alert & oriented. Last BM 12/8. c/o constipation - LIP notified. No chewing/swallowing difficulty reported. Skin: R DFU.

## 2019-12-11 NOTE — DIETITIAN INITIAL EVALUATION ADULT. - ENERGY NEEDS
Energy: 0554-4551 kcal/day (MSJx1.1-1.2 AF) d/t BMI borderline 30    Protein: 78-94 g/day (1-1.2 g/kg IBW) - as above    Fluids: 1 mL/kcal

## 2019-12-11 NOTE — PROGRESS NOTE ADULT - ASSESSMENT
ASSESSMENT  68 y/o Male with a PMH of DM, Previous DFU's, PVD, HTN, HLD, GERD, Depression. s/p Right Hallux Amputation, recent admission for plantar ulcer after stepping on a screw, d/c with PICC    IMPRESSION  #Gangrenous Right 3rd Digit w/ Chronic Diabetic Pressure Ulcer Plantar Aspect    3rd toe cx   Numerous Stenotrophomonas maltophilia    OR cx Enterobacter (R cefepime, zosyn, S fluoro/bactrim)    12/5 s/p Ray amputation of third toe of right foot   #Previous Admission; Grew MSSA; (PICC 11/14-11/21); Ceftriaxone 2g QD and Flagyl 0.5g BID  #DM    RECOMMENDATIONS  - pending amputation  - Belen 1g q8h IV  - levaquin 750mg daily PO for Stenotrophomonas   - has a picc line, clean  - Plan for D/C with ertapenem 1g q24h IV and PO levaquin 750 daily x 4-6 weeks  - Weekly CBC, BMP  - ID follow-up with America Turpin or Thurs Dr. Mohsena Amin      [address] [facility]       [phone] (call to make an appointment, walk-ins Tuesdays 10:30 AM)      Fax [phone]

## 2019-12-11 NOTE — PROGRESS NOTE ADULT - SUBJECTIVE AND OBJECTIVE BOX
JOSÉ MANUEL PORTER  69y, Male  Allergy: No Known Allergies      CHIEF COMPLAINT: Worsening Right Diabetic Foot Ulcer (11 Dec 2019 12:13)      INTERVAL EVENTS/HPI  - No acute events overnight, pending OR  - T(F): , Max: 99 (12-10-19 @ 21:00)  - Denies any worsening symptoms  - Tolerating medication  - WBC Count: 7.63 (12-11-19 @ 06:27)  WBC Count: 6.78 (12-10-19 @ 05:26)   - Creatinine, Serum: 0.9 (12-11-19 @ 06:27)  Creatinine, Serum: 1.0 (12-10-19 @ 05:26)       ROS  General: Denies rigors, nightsweats  HEENT: Denies headache, rhinorrhea, sore throat, eye pain  CV: Denies CP, palpitations  PULM: Denies wheezing, hemoptysis  GI: Denies hematemesis, hematochezia, melena  : Denies discharge, hematuria  MSK: Denies arthralgias, myalgias  SKIN: Denies rash, lesions  NEURO: Denies paresthesias, weakness  PSYCH: Denies depression, anxiety    VITALS:  T(F): 96.9, Max: 99 (12-10-19 @ 21:00)  HR: 90  BP: 133/70  RR: 18Vital Signs Last 24 Hrs  T(C): 36.1 (11 Dec 2019 15:24), Max: 37.2 (10 Dec 2019 21:00)  T(F): 96.9 (11 Dec 2019 14:45), Max: 99 (10 Dec 2019 21:00)  HR: 90 (11 Dec 2019 15:24) (82 - 95)  BP: 133/70 (11 Dec 2019 15:24) (129/62 - 133/70)  BP(mean): --  RR: 18 (11 Dec 2019 15:24) (18 - 18)  SpO2: 100% (11 Dec 2019 15:24) (100% - 100%)    PHYSICAL EXAM:  Gen: NAD, resting in bed  HEENT: Normocephalic, atraumatic  Neck: supple, no lymphadenopathy  CV: Regular rate & regular rhythm  Lungs: decreased BS at bases  Abdomen: Soft, BS present  Ext: Warm, well perfused, R dressings  Neuro: non focal, awake  Skin: no rash, no erythema    FH: Non-contributory  Social Hx: Non-contributory    TESTS & MEASUREMENTS:                        9.6    7.63  )-----------( 448      ( 11 Dec 2019 06:27 )             31.2     12-11    142  |  104  |  15  ----------------------------<  153<H>  4.8   |  24  |  0.9    Ca    8.8      11 Dec 2019 06:27  Mg     1.7     12-11    TPro  6.7  /  Alb  3.1<L>  /  TBili  0.2  /  DBili  x   /  AST  13  /  ALT  8   /  AlkPhos  286<H>  12-11    eGFR if Non African American: 86 mL/min/1.73M2 (12-11-19 @ 06:27)  eGFR if : 99 mL/min/1.73M2 (12-11-19 @ 06:27)    LIVER FUNCTIONS - ( 11 Dec 2019 06:27 )  Alb: 3.1 g/dL / Pro: 6.7 g/dL / ALK PHOS: 286 U/L / ALT: 8 U/L / AST: 13 U/L / GGT: 402 U/L           Culture - Blood (collected 12-04-19 @ 22:04)  Source: .Blood None  Final Report (12-10-19 @ 02:11):    No growth at 5 days.    Culture - Tissue with Gram Stain (collected 12-04-19 @ 21:00)  Source: .Tissue None  Gram Stain (12-05-19 @ 12:57):    Numerous Gram Negative Rods seen per oil power field    No polymorphonuclear leukocytes seen per low power field  Preliminary Report (12-08-19 @ 08:38):    Numerous Enterobacter cloacae complex    Few Corynebacterium species "Susceptibilities not performed"  Organism: Enterobacter cloacae complex (12-07-19 @ 09:10)  Organism: Enterobacter cloacae complex (12-07-19 @ 09:10)      -  Amikacin: S <=16      -  Amoxicillin/Clavulanic Acid: R >16/8      -  Ampicillin: R >16 These ampicillin results predict results for amoxicillin      -  Ampicillin/Sulbactam: R >16/8 Enterobacter, Citrobacter, and Serratia may develop resistance during prolonged therapy (3-4 days)      -  Aztreonam: R >16      -  Cefazolin: R >16 Enterobacter, Citrobacter, and Serratia may develop resistance during prolonged therapy (3-4 days)      -  Cefepime: R >16      -  Cefoxitin: R >16      -  Ceftriaxone: R >32 Enterobacter, Citrobacter, and Serratia may develop resistance during prolonged therapy      -  Ciprofloxacin: S <=1      -  Ertapenem: I 1      -  Gentamicin: S <=2      -  Imipenem: S <=1      -  Levofloxacin: S <=2      -  Meropenem: S <=1      -  Piperacillin/Tazobactam: R >64      -  Tobramycin: S <=2      -  Trimethoprim/Sulfamethoxazole: S <=2/38      Method Type: LINDSEY    Culture - Fungal, Other (collected 12-04-19 @ 21:00)  Source: .Other None  Preliminary Report (12-06-19 @ 06:24):    Testing in progress    Culture - Surgical Swab (collected 12-04-19 @ 21:00)  Source: .Surgical Swab None  Final Report (12-11-19 @ 12:51):    Numerous Enterobacter cloacae complex    Numerous Stenotrophomonas maltophilia    Few Corynebacterium species "Susceptibilities not performed"    Few Anaerobic Gram Negative Berhane Unable to Indentify further    "Susceptibilities not performed"  Organism: Enterobacter cloacae complex  Stenotrophomonas maltophilia (12-11-19 @ 12:51)  Organism: Stenotrophomonas maltophilia (12-11-19 @ 12:51)      -  Ceftazidime: I 16      -  Levofloxacin: S <=2      -  Trimethoprim/Sulfamethoxazole: S <=2/38      Method Type: LINDSEY  Organism: Enterobacter cloacae complex (12-11-19 @ 12:51)      -  Amikacin: S <=16      -  Amoxicillin/Clavulanic Acid: R >16/8      -  Ampicillin: R >16 These ampicillin results predict results for amoxicillin      -  Ampicillin/Sulbactam: R >16/8 Enterobacter, Citrobacter, and Serratia may develop resistance during prolonged therapy (3-4 days)      -  Aztreonam: R >16      -  Cefazolin: R >16 Enterobacter, Citrobacter, and Serratia may develop resistance during prolonged therapy (3-4 days)      -  Cefepime: R >16      -  Cefoxitin: R >16      -  Ceftriaxone: R >32 Enterobacter, Citrobacter, and Serratia may develop resistance during prolonged therapy      -  Ciprofloxacin: S <=1      -  Ertapenem: I 1      -  Gentamicin: S <=2      -  Imipenem: S <=1      -  Levofloxacin: S <=2      -  Meropenem: S <=1      -  Piperacillin/Tazobactam: R >64      -  Tobramycin: S <=2      -  Trimethoprim/Sulfamethoxazole: S <=2/38      Method Type: LINDSEY            INFECTIOUS DISEASES TESTING      RADIOLOGY & ADDITIONAL TESTS:  I have personally reviewed the last Chest xray  CXR      CT      CARDIOLOGY TESTING  Transthoracic Echocardiogram:    EXAM:  2-D ECHO (TTE) COMPLETE        PROCEDURE DATE:  12/04/2019      INTERPRETATION:  REPORT:    TRANSTHORACIC ECHOCARDIOGRAM REPORT         Patient Name:   JOSÉ MANUEL PORTER Accession #: [id number]  Medical Rec #:  [id number]       Height:      71.5 in 181.6 cm  YOB: 1950      Weight:      216.0 lb 97.98 kg  Patient Age:    69 years       BSA:         2.19 m²  Patient Gender: M              BP:          111/58 mmHg       Date of Exam:        12/4/2019 12:23:18 PM  Referring Physician: [id number] ED UNASSIGNED  Sonographer:         Andrea Albright  Reading Physician:   Misha Saeed M.D.    Procedure: 2D Echo/Doppler/Color Doppler Complete.  Diagnosis: Shortness of breath - R06.02         Summary:   1. Left ventricular ejection fraction, by visual estimation, is 55 to   60%.   2. Spectral Doppler shows impaired relaxation pattern of left   ventricular myocardial filling (Grade I diastolic dysfunction).   3. Estimated pulmonary artery systolic pressure is 36.1 mmHg assuming a   right atrial pressure of 3 mmHg, which is consistent with borderline   pulmonary hypertension.    PHYSICIAN INTERPRETATION:  Left Ventricle: The left ventricular internal cavity size is normal. Left   ventricular wall thickness is normal. Left ventricular ejection fraction,   by visual estimation, is 55 to 60%. Spectral Doppler shows impaired   relaxation pattern of left ventricular myocardial filling (Grade I   diastolic dysfunction).  Right Ventricle: Normal right ventricular size and function.  Left Atrium: Normal left atrial size.  Right Atrium: Normal right atrial size.  Pericardium: There is no evidence of pericardial effusion.  Mitral Valve: Structurally normal mitral valve, with normal leaflet   excursion. The mitral valve is normal in structure. No evidence of mitral   valve regurgitation is seen.  Tricuspid Valve: Structurally normal tricuspid valve, with normal leaflet   excursion. The tricuspid valve is normal in structure. No tricuspid   regurgitation is visualized. Estimated pulmonary artery systolic pressure   is 36.1 mmHg assuming a right atrial pressure of 3 mmHg, which is   consistent with borderline pulmonary hypertension.  Aortic Valve: Normal trileaflet aortic valve with normal opening. The   aortic valve is normal. No evidence of aortic valve regurgitation is seen.  Pulmonic Valve: Structurally normal pulmonic valve, with normal leaflet   excursion. The pulmonic valve is normal. No indication of pulmonic valve   regurgitation.  Aorta: The aortic root and ascending aorta are structurally normal, with   no evidence of dilitation.  Pulmonary Artery: The main pulmonary artery is normal in size.       2D AND M-MODE MEASUREMENTS (normal ranges within parentheses):  Left                  Normal   Aorta/Left             Normal  Ventricle:                     Atrium:  IVSd (2D):  0.97 cm  (0.7-1.1) AoV Cusp       2.23  (1.5-2.6)  LVPWd       1.48 cm  (0.7-1.1) Separation:     cm  (2D):                          Left Atrium    4.55  (1.9-4.0)  LVIDd       3.95 cm  (3.4-5.7) (Mmode):        cm  (2D):                          LA Volume      18.8  LVIDs       2.83 cm            Index         ml/m²  (2D):                          Right  LV FS       28.3 %    (>25%)   Ventricle:  (2D):                          RVd (2D):    3.64 cm  IVSd        0.86 cm  (0.7-1.1)  (Mmode):  LVPWd       0.99 cm  (0.7-1.1)  (Mmode):  LVIDd       4.96 cm  (3.4-5.7)  (Mmode):  LVIDs       2.99 cm  (Mmode):  LV FS       39.7 %    (>25%)  (Mmode):  Relative     0.75     (<0.42)  Wall  Thickness  Rel. Wall    0.40     (<0.42)  Thickness  Mm  LV Mass    73.7 g/m²  Index:  Mmode    SPECTRAL DOPPLER ANALYSIS:  LV DIASTOLIC FUNCTION:  MV Peak E: 1.05 m/s Decel Time: 163 msec  MV Peak A: 0.94 m/s  E/A Ratio: 1.11    Aortic Valve:  AoV VMax:   1.32 m/s AoV Area, Vmax: 2.92 cm² Vmax Indx: 1.33 cm²/m²  AoV Pk Grad: 7.0 mmHg    LVOT Vmax: 1.09 m/s  LVOT VTI:  0.25 m  LVOT Diam: 2.13 cm    Mitral Valve:  MV P1/2 Time: 47.32 msec  MV Area, PHT: 4.65 cm²    Tricuspid Valve and PA/RV Systolic Pressure: TR Max Velocity: 2.88 m/s RA   Pressure: 3 mmHg RVSP/PASP: 36.1 mmHg    Pulmonic Valve:  PV Max Velocity: 0.90 m/s PV Max PG: 3.3 mmHg PV Mean PG:       [id number] Misha Saeed M.D., Electronically signed on 12/4/2019 at 2:35:33   PM              *** Final ***                    MISHA SAEED MD  This document has been electronically signed. Dec  4 2019 12:23PM             (12-04-19 @ 12:23)  12 Lead ECG:   Ventricular Rate 87 BPM    Atrial Rate 87 BPM    P-R Interval 132 ms    QRS Duration 66 ms    Q-T Interval 372 ms    QTC Calculation(Bezet) 447 ms    P Axis 64 degrees    R Axis 24 degrees    T Axis 51 degrees    Diagnosis Line Normal sinus rhythm  Nonspecific T wave abnormality  Abnormal ECG    Confirmed by DAVE COWAN MD ([id number]) on 12/3/2019 3:27:36 PM (12-03-19 @ 11:55)      MEDICATIONS      ANTIBIOTICS:      All available historical records have been reviewed

## 2019-12-11 NOTE — DIETITIAN INITIAL EVALUATION ADULT. - RD TO REMAIN AVAILABLE
Nutrition Intervention: Meals & Snacks, Nutrition Related Medication Management. Monitor: Diet order, energy intake, glucose profile, renal profile, nutrition focused physical findings, body composition./yes

## 2019-12-11 NOTE — DIETITIAN INITIAL EVALUATION ADULT. - OTHER INFO
Pertinent Medical Information: p/w worsening of a previously diagnosed R third toe wound. Septic arthritis in setting of PVD in RLE noted. s/p Amputation of right third toe on 12/4. Dyspnea with fatigue, loss of appetite x3 days - resolved per progress notes. TYLER - noted resolved. HTN noted well controlled.    Pertinent Subjective Information: Reports good appetite & po intake PTP. Heart healthy & low sugar diet PTP reported. NKFA. No supplements. No preferences reported. Demonstrates basic knowledge of DM & Heart Healthy nutrition concepts. Reports previously receiving nutrition education from RD during a previous admission. Declined additional nutrition education at this time. No known h/o unintentional wt loss.

## 2019-12-12 ENCOUNTER — TRANSCRIPTION ENCOUNTER (OUTPATIENT)
Age: 69
End: 2019-12-12

## 2019-12-12 LAB
ALBUMIN SERPL ELPH-MCNC: 3 G/DL — LOW (ref 3.5–5.2)
ALP SERPL-CCNC: 242 U/L — HIGH (ref 30–115)
ALT FLD-CCNC: 7 U/L — SIGNIFICANT CHANGE UP (ref 0–41)
ANION GAP SERPL CALC-SCNC: 11 MMOL/L — SIGNIFICANT CHANGE UP (ref 7–14)
APPEARANCE UR: CLEAR — SIGNIFICANT CHANGE UP
AST SERPL-CCNC: 12 U/L — SIGNIFICANT CHANGE UP (ref 0–41)
BASOPHILS # BLD AUTO: 0.03 K/UL — SIGNIFICANT CHANGE UP (ref 0–0.2)
BASOPHILS NFR BLD AUTO: 0.3 % — SIGNIFICANT CHANGE UP (ref 0–1)
BILIRUB SERPL-MCNC: 0.3 MG/DL — SIGNIFICANT CHANGE UP (ref 0.2–1.2)
BILIRUB UR-MCNC: NEGATIVE — SIGNIFICANT CHANGE UP
BUN SERPL-MCNC: 11 MG/DL — SIGNIFICANT CHANGE UP (ref 10–20)
CALCIUM SERPL-MCNC: 8.7 MG/DL — SIGNIFICANT CHANGE UP (ref 8.5–10.1)
CHLORIDE SERPL-SCNC: 101 MMOL/L — SIGNIFICANT CHANGE UP (ref 98–110)
CO2 SERPL-SCNC: 25 MMOL/L — SIGNIFICANT CHANGE UP (ref 17–32)
COLOR SPEC: SIGNIFICANT CHANGE UP
CREAT SERPL-MCNC: 0.8 MG/DL — SIGNIFICANT CHANGE UP (ref 0.7–1.5)
DIFF PNL FLD: NEGATIVE — SIGNIFICANT CHANGE UP
EOSINOPHIL # BLD AUTO: 0.01 K/UL — SIGNIFICANT CHANGE UP (ref 0–0.7)
EOSINOPHIL NFR BLD AUTO: 0.1 % — SIGNIFICANT CHANGE UP (ref 0–8)
GLUCOSE BLDC GLUCOMTR-MCNC: 100 MG/DL — HIGH (ref 70–99)
GLUCOSE BLDC GLUCOMTR-MCNC: 145 MG/DL — HIGH (ref 70–99)
GLUCOSE BLDC GLUCOMTR-MCNC: 171 MG/DL — HIGH (ref 70–99)
GLUCOSE BLDC GLUCOMTR-MCNC: 173 MG/DL — HIGH (ref 70–99)
GLUCOSE SERPL-MCNC: 184 MG/DL — HIGH (ref 70–99)
GLUCOSE UR QL: NEGATIVE — SIGNIFICANT CHANGE UP
HCT VFR BLD CALC: 28.4 % — LOW (ref 42–52)
HGB BLD-MCNC: 8.9 G/DL — LOW (ref 14–18)
IMM GRANULOCYTES NFR BLD AUTO: 1.1 % — HIGH (ref 0.1–0.3)
KETONES UR-MCNC: SIGNIFICANT CHANGE UP
LEUKOCYTE ESTERASE UR-ACNC: NEGATIVE — SIGNIFICANT CHANGE UP
LYMPHOCYTES # BLD AUTO: 0.47 K/UL — LOW (ref 1.2–3.4)
LYMPHOCYTES # BLD AUTO: 4.7 % — LOW (ref 20.5–51.1)
MAGNESIUM SERPL-MCNC: 1.7 MG/DL — LOW (ref 1.8–2.4)
MCHC RBC-ENTMCNC: 27.7 PG — SIGNIFICANT CHANGE UP (ref 27–31)
MCHC RBC-ENTMCNC: 31.3 G/DL — LOW (ref 32–37)
MCV RBC AUTO: 88.5 FL — SIGNIFICANT CHANGE UP (ref 80–94)
MONOCYTES # BLD AUTO: 0.79 K/UL — HIGH (ref 0.1–0.6)
MONOCYTES NFR BLD AUTO: 7.8 % — SIGNIFICANT CHANGE UP (ref 1.7–9.3)
NEUTROPHILS # BLD AUTO: 8.67 K/UL — HIGH (ref 1.4–6.5)
NEUTROPHILS NFR BLD AUTO: 86 % — HIGH (ref 42.2–75.2)
NITRITE UR-MCNC: NEGATIVE — SIGNIFICANT CHANGE UP
NRBC # BLD: 0 /100 WBCS — SIGNIFICANT CHANGE UP (ref 0–0)
PH UR: 6 — SIGNIFICANT CHANGE UP (ref 5–8)
PLATELET # BLD AUTO: 416 K/UL — HIGH (ref 130–400)
POTASSIUM SERPL-MCNC: 4.7 MMOL/L — SIGNIFICANT CHANGE UP (ref 3.5–5)
POTASSIUM SERPL-SCNC: 4.7 MMOL/L — SIGNIFICANT CHANGE UP (ref 3.5–5)
PROT SERPL-MCNC: 6.2 G/DL — SIGNIFICANT CHANGE UP (ref 6–8)
PROT UR-MCNC: SIGNIFICANT CHANGE UP
RBC # BLD: 3.21 M/UL — LOW (ref 4.7–6.1)
RBC # FLD: 13.8 % — SIGNIFICANT CHANGE UP (ref 11.5–14.5)
SODIUM SERPL-SCNC: 137 MMOL/L — SIGNIFICANT CHANGE UP (ref 135–146)
SP GR SPEC: 1.01 — SIGNIFICANT CHANGE UP (ref 1.01–1.02)
UROBILINOGEN FLD QL: SIGNIFICANT CHANGE UP
WBC # BLD: 10.08 K/UL — SIGNIFICANT CHANGE UP (ref 4.8–10.8)
WBC # FLD AUTO: 10.08 K/UL — SIGNIFICANT CHANGE UP (ref 4.8–10.8)

## 2019-12-12 PROCEDURE — 99233 SBSQ HOSP IP/OBS HIGH 50: CPT

## 2019-12-12 RX ORDER — INSULIN GLARGINE 100 [IU]/ML
45 INJECTION, SOLUTION SUBCUTANEOUS ONCE
Refills: 0 | Status: COMPLETED | OUTPATIENT
Start: 2019-12-12 | End: 2019-12-12

## 2019-12-12 RX ORDER — MAGNESIUM SULFATE 500 MG/ML
2 VIAL (ML) INJECTION ONCE
Refills: 0 | Status: COMPLETED | OUTPATIENT
Start: 2019-12-12 | End: 2019-12-12

## 2019-12-12 RX ADMIN — MEROPENEM 100 MILLIGRAM(S): 1 INJECTION INTRAVENOUS at 22:05

## 2019-12-12 RX ADMIN — PANTOPRAZOLE SODIUM 40 MILLIGRAM(S): 20 TABLET, DELAYED RELEASE ORAL at 05:09

## 2019-12-12 RX ADMIN — Medication 50 GRAM(S): at 09:46

## 2019-12-12 RX ADMIN — Medication 2: at 17:07

## 2019-12-12 RX ADMIN — Medication 2: at 08:10

## 2019-12-12 RX ADMIN — MEROPENEM 100 MILLIGRAM(S): 1 INJECTION INTRAVENOUS at 05:10

## 2019-12-12 RX ADMIN — Medication 15 UNIT(S): at 12:03

## 2019-12-12 RX ADMIN — MEROPENEM 100 MILLIGRAM(S): 1 INJECTION INTRAVENOUS at 13:44

## 2019-12-12 RX ADMIN — ATORVASTATIN CALCIUM 40 MILLIGRAM(S): 80 TABLET, FILM COATED ORAL at 22:06

## 2019-12-12 RX ADMIN — Medication 81 MILLIGRAM(S): at 11:58

## 2019-12-12 RX ADMIN — Medication 15 UNIT(S): at 08:07

## 2019-12-12 RX ADMIN — INSULIN GLARGINE 45 UNIT(S): 100 INJECTION, SOLUTION SUBCUTANEOUS at 22:07

## 2019-12-12 RX ADMIN — LISINOPRIL 40 MILLIGRAM(S): 2.5 TABLET ORAL at 05:10

## 2019-12-12 RX ADMIN — Medication 15 UNIT(S): at 17:07

## 2019-12-12 RX ADMIN — Medication 60 MILLIGRAM(S): at 05:10

## 2019-12-12 NOTE — PROGRESS NOTE ADULT - SUBJECTIVE AND OBJECTIVE BOX
Patient Information:  JOSÉ MANUEL PORTER / 69y / Male / MRN#:[id number]    Hospital Day: 9d    HPI:  69 year-old male with PMH of insulin-dependent DM c/b diabetic foot ulcers s/p amputations of half of left great toe and entire right great toe, PVD, HTN, HLD, GERD, and depression who presents with worsening of a previously diagnosed R third toe wound. Patient was recently admitted to [facility] from 11/14-11/21 for worsening of the same diabetic foot ulcer of right foot with OM with cultures growing MSSA; patient had PICC line placed prior to discharge and was receiving PO Flagyl 500 mg BID and IV Rocephin 2 g qD with scheduled ID (Dr. Gary Turpin) follow-up.    Today, patient presents with mild pain and numbness in right third toe. Per patient and wife at bedside, he had appointment with Vascular Surgery scheduled for today but decided to come to the ED due to worsening appearance of toe (was starting to appear darker) in addition to feeling short of breath, fatigue, and loss of appetite for past three days. + Pain in R foot, + loss of appetite, + fatigue, + shortness of breath x3 days. Denies fever/chills/HA/change in vision/rhinorrhea/sore throat/cough/chest pain/abdominal pain/constipation/diarrhea.    Interval History:  Patient seen and examined at bedside. No acute events overnight.    Past Medical History:  GERD (gastroesophageal reflux disease)  Depression  Diabetic foot ulcer  Dyslipidemia  HTN (hypertension)  DM (diabetes mellitus)    Past Surgical History:  Toe amputation status, right    Allergies:  No Known Allergies    Medications:  PRN:  acetaminophen   Tablet .. 650 milliGRAM(s) Oral every 6 hours PRN Temp greater or equal to 38C (100.4F)    Standing:  aspirin enteric coated 81 milliGRAM(s) Oral daily  atorvastatin 40 milliGRAM(s) Oral at bedtime  insulin glargine Injectable (LANTUS) 50 Unit(s) SubCutaneous at bedtime  insulin lispro (HumaLOG) corrective regimen sliding scale   SubCutaneous three times a day before meals  insulin lispro Injectable (HumaLOG) 15 Unit(s) SubCutaneous three times a day before meals  levoFLOXacin IVPB 750 milliGRAM(s) IV Intermittent every 24 hours  lisinopril 40 milliGRAM(s) Oral daily  meropenem  IVPB 1000 milliGRAM(s) IV Intermittent every 8 hours  NIFEdipine XL 60 milliGRAM(s) Oral daily  pantoprazole    Tablet 40 milliGRAM(s) Oral before breakfast    Home:  benazepril 40 mg oral tablet: 1 tab(s) orally once a day  Crestor 10 mg oral tablet: 1 tab(s) orally once a day (at bedtime)  HumuLIN 70/30 KwikPen 70 units-30 units/mL subcutaneous suspension: 50 unit(s) subcutaneous once a day before breakfast  HumuLIN 70/30 subcutaneous suspension: 40 unit(s) subcutaneous once a day (at bedtime)  Janumet 50 mg-1000 mg oral tablet: 1 tab(s) orally 2 times a day  omeprazole 20 mg oral delayed release capsule: 1 cap(s) orally once a day    Vitals:  T(C): 37.8, Max: 37.8 (12-12-19 @ 04:46)  T(F): 100, Max: 100 (12-12-19 @ 04:46)  HR: 108 (86 - 108)  BP: 162/81 (133/70 - 162/81)  RR: 18 (14 - 20)  SpO2: 98% (97% - 100%)    Physical Exam:  General: Awake, Alert. Not in acute distress.  Heart: Regular rate and rhythm; S1, S2; No murmurs.  Lungs: Clear to auscultation bilaterally.  Abdomen: Soft, nontender, nondistended.  Extremities: Right foot wrapped  Neuro: AAOx3, No focal deficits.    Labs:  CBC (12-12 @ 06:09)                        Hgb: 8.9    WBC: 10.08 )-----------------( Plts: 416                              Hct: 28.4     Chem (12-12 @ 06:09)  Na: 137  |     Cl: 101     |  BUN: 11  -----------------------------------------< Gluc: 184    K: 4.7   |    HCO3: 25  |  Cr: 0.8    Ca 8.7 (12-12 @ 06:09)  Mg 1.7 (12-12 @ 06:09)    LFTs (12-12 @ 06:09)  TPro 6.2  /  Alb 3.0  TBili 0.3  /  DBili     AST 12  /  ALT 7   /  AlkPhos 242 Patient Information:  JOSÉ MANUEL PORTER / 69y / Male / MRN#:[id number]    Hospital Day: 9d    HPI:  69 year-old male with PMH of insulin-dependent DM c/b diabetic foot ulcers s/p amputations of half of left great toe and entire right great toe, PVD, HTN, HLD, GERD, and depression who presents with worsening of a previously diagnosed R third toe wound. Patient was recently admitted to [facility] from 11/14-11/21 for worsening of the same diabetic foot ulcer of right foot with OM with cultures growing MSSA; patient had PICC line placed prior to discharge and was receiving PO Flagyl 500 mg BID and IV Rocephin 2 g qD with scheduled ID (Dr. Gary Turpin) follow-up.    Today, patient presents with mild pain and numbness in right third toe. Per patient and wife at bedside, he had appointment with Vascular Surgery scheduled for today but decided to come to the ED due to worsening appearance of toe (was starting to appear darker) in addition to feeling short of breath, fatigue, and loss of appetite for past three days. + Pain in R foot, + loss of appetite, + fatigue, + shortness of breath x3 days. Denies fever/chills/HA/change in vision/rhinorrhea/sore throat/cough/chest pain/abdominal pain/constipation/diarrhea.    Interval History:  Patient seen and examined at bedside. No acute events overnight. Patient complaining of increased urinary frequency and dysuria, UA ordered.    Past Medical History:  GERD (gastroesophageal reflux disease)  Depression  Diabetic foot ulcer  Dyslipidemia  HTN (hypertension)  DM (diabetes mellitus)    Past Surgical History:  Toe amputation status, right    Allergies:  No Known Allergies    Medications:  PRN:  acetaminophen   Tablet .. 650 milliGRAM(s) Oral every 6 hours PRN Temp greater or equal to 38C (100.4F)    Standing:  aspirin enteric coated 81 milliGRAM(s) Oral daily  atorvastatin 40 milliGRAM(s) Oral at bedtime  insulin glargine Injectable (LANTUS) 50 Unit(s) SubCutaneous at bedtime  insulin lispro (HumaLOG) corrective regimen sliding scale   SubCutaneous three times a day before meals  insulin lispro Injectable (HumaLOG) 15 Unit(s) SubCutaneous three times a day before meals  levoFLOXacin IVPB 750 milliGRAM(s) IV Intermittent every 24 hours  lisinopril 40 milliGRAM(s) Oral daily  meropenem  IVPB 1000 milliGRAM(s) IV Intermittent every 8 hours  NIFEdipine XL 60 milliGRAM(s) Oral daily  pantoprazole    Tablet 40 milliGRAM(s) Oral before breakfast    Home:  benazepril 40 mg oral tablet: 1 tab(s) orally once a day  Crestor 10 mg oral tablet: 1 tab(s) orally once a day (at bedtime)  HumuLIN 70/30 KwikPen 70 units-30 units/mL subcutaneous suspension: 50 unit(s) subcutaneous once a day before breakfast  HumuLIN 70/30 subcutaneous suspension: 40 unit(s) subcutaneous once a day (at bedtime)  Janumet 50 mg-1000 mg oral tablet: 1 tab(s) orally 2 times a day  omeprazole 20 mg oral delayed release capsule: 1 cap(s) orally once a day    Vitals:  T(C): 37.8, Max: 37.8 (12-12-19 @ 04:46)  T(F): 100, Max: 100 (12-12-19 @ 04:46)  HR: 108 (86 - 108)  BP: 162/81 (133/70 - 162/81)  RR: 18 (14 - 20)  SpO2: 98% (97% - 100%)    Physical Exam:  General: Awake, Alert. Not in acute distress.  Heart: Regular rate and rhythm; S1, S2; No murmurs.  Lungs: Clear to auscultation bilaterally.  Abdomen: Soft, nontender, nondistended.  Extremities: Right foot wrapped  Neuro: AAOx3, No focal deficits.    Labs:  CBC (12-12 @ 06:09)                        Hgb: 8.9    WBC: 10.08 )-----------------( Plts: 416                              Hct: 28.4     Chem (12-12 @ 06:09)  Na: 137  |     Cl: 101     |  BUN: 11  -----------------------------------------< Gluc: 184    K: 4.7   |    HCO3: 25  |  Cr: 0.8    Ca 8.7 (12-12 @ 06:09)  Mg 1.7 (12-12 @ 06:09)    LFTs (12-12 @ 06:09)  TPro 6.2  /  Alb 3.0  TBili 0.3  /  DBili     AST 12  /  ALT 7   /  AlkPhos 242 Patient Information:  JOSÉ MANUEL PORTER / 69y / Male / MRN#:[id number]    Hospital Day: 9d    HPI:  69 year-old male with PMH of insulin-dependent DM c/b diabetic foot ulcers s/p amputations of half of left great toe and entire right great toe, PVD, HTN, HLD, GERD, and depression who presents with worsening of a previously diagnosed R third toe wound. Patient was recently admitted to [facility] from 11/14-11/21 for worsening of the same diabetic foot ulcer of right foot with OM with cultures growing MSSA; patient had PICC line placed prior to discharge and was receiving PO Flagyl 500 mg BID and IV Rocephin 2 g qD with scheduled ID (Dr. Gary Turpin) follow-up.    Today, patient presents with mild pain and numbness in right third toe. Per patient and wife at bedside, he had appointment with Vascular Surgery scheduled for today but decided to come to the ED due to worsening appearance of toe (was starting to appear darker) in addition to feeling short of breath, fatigue, and loss of appetite for past three days. + Pain in R foot, + loss of appetite, + fatigue, + shortness of breath x3 days. Denies fever/chills/HA/change in vision/rhinorrhea/sore throat/cough/chest pain/abdominal pain/constipation/diarrhea.    Interval History:  Patient seen and examined at bedside. No acute events overnight. Patient complaining of increased urinary frequency and dysuria, UA ordered. S/p podiatry procedure yesterday, pending dispo recommendations.    Past Medical History:  GERD (gastroesophageal reflux disease)  Depression  Diabetic foot ulcer  Dyslipidemia  HTN (hypertension)  DM (diabetes mellitus)    Past Surgical History:  Toe amputation status, right    Allergies:  No Known Allergies    Medications:  PRN:  acetaminophen   Tablet .. 650 milliGRAM(s) Oral every 6 hours PRN Temp greater or equal to 38C (100.4F)    Standing:  aspirin enteric coated 81 milliGRAM(s) Oral daily  atorvastatin 40 milliGRAM(s) Oral at bedtime  insulin glargine Injectable (LANTUS) 50 Unit(s) SubCutaneous at bedtime  insulin lispro (HumaLOG) corrective regimen sliding scale   SubCutaneous three times a day before meals  insulin lispro Injectable (HumaLOG) 15 Unit(s) SubCutaneous three times a day before meals  levoFLOXacin IVPB 750 milliGRAM(s) IV Intermittent every 24 hours  lisinopril 40 milliGRAM(s) Oral daily  meropenem  IVPB 1000 milliGRAM(s) IV Intermittent every 8 hours  NIFEdipine XL 60 milliGRAM(s) Oral daily  pantoprazole    Tablet 40 milliGRAM(s) Oral before breakfast    Home:  benazepril 40 mg oral tablet: 1 tab(s) orally once a day  Crestor 10 mg oral tablet: 1 tab(s) orally once a day (at bedtime)  HumuLIN 70/30 KwikPen 70 units-30 units/mL subcutaneous suspension: 50 unit(s) subcutaneous once a day before breakfast  HumuLIN 70/30 subcutaneous suspension: 40 unit(s) subcutaneous once a day (at bedtime)  Janumet 50 mg-1000 mg oral tablet: 1 tab(s) orally 2 times a day  omeprazole 20 mg oral delayed release capsule: 1 cap(s) orally once a day    Vitals:  T(C): 37.8, Max: 37.8 (12-12-19 @ 04:46)  T(F): 100, Max: 100 (12-12-19 @ 04:46)  HR: 108 (86 - 108)  BP: 162/81 (133/70 - 162/81)  RR: 18 (14 - 20)  SpO2: 98% (97% - 100%)    Physical Exam:  General: Awake, Alert. Not in acute distress.  Heart: Regular rate and rhythm; S1, S2; No murmurs.  Lungs: Clear to auscultation bilaterally.  Abdomen: Soft, nontender, nondistended.  Extremities: Right foot wrapped  Neuro: AAOx3, No focal deficits.    Labs:  CBC (12-12 @ 06:09)                        Hgb: 8.9    WBC: 10.08 )-----------------( Plts: 416                              Hct: 28.4     Chem (12-12 @ 06:09)  Na: 137  |     Cl: 101     |  BUN: 11  -----------------------------------------< Gluc: 184    K: 4.7   |    HCO3: 25  |  Cr: 0.8    Ca 8.7 (12-12 @ 06:09)  Mg 1.7 (12-12 @ 06:09)    LFTs (12-12 @ 06:09)  TPro 6.2  /  Alb 3.0  TBili 0.3  /  DBili     AST 12  /  ALT 7   /  AlkPhos 242

## 2019-12-12 NOTE — CONSULT NOTE ADULT - SUBJECTIVE AND OBJECTIVE BOX
HPI:  69 year-old male with PMH of insulin-dependent DM c/b diabetic foot ulcers s/p amputations of half of left great toe and entire right great toe, PVD, HTN, HLD, GERD, and depression who presents with worsening of a previously diagnosed R third toe wound. Patient was recently admitted to [facility] from 11/14-11/21 for worsening of the same diabetic foot ulcer of right foot with OM with cultures growing MSSA; patient had PICC line placed prior to discharge and was receiving PO Flagyl 500 mg BID and IV Rocephin 2 g qD with scheduled ID (Dr. Gary Turpin) follow-up.    Today, patient presents with mild pain and numbness in right third toe. Per patient and wife at bedside, he had appointment with Vascular Surgery scheduled for today but decided to come to the ED due to worsening appearance of toe (was starting to appear darker) in addition to feeling short of breath, fatigue, and loss of appetite for past three days. + Pain in R foot, + loss of appetite, + fatigue, + shortness of breath x3 days. Denies fever/chills/HA/change in vision/rhinorrhea/sore throat/cough/chest pain/abdominal pain/constipation/diarrhea. (03 Dec 2019 13:34)        PAST MEDICAL & SURGICAL HISTORY:  GERD (gastroesophageal reflux disease)  Depression  Diabetic foot ulcer  Dyslipidemia  HTN (hypertension)  DM (diabetes mellitus): 12/27/18 hgb aic  11.2  Toe amputation status, right: (2008)      Hospital Course: s/p Rt 4th ray partial toe amputation 12/11    TODAY'S SUBJECTIVE & REVIEW OF SYMPTOMS:     Constitutional WNL   Cardio WNL   Resp WNL   GI WNL  Heme WNL  Endo WNL  Skin WNL  MSK WNL  Neuro WNL  Cognitive WNL  Psych WNL      MEDICATIONS  (STANDING):  aspirin enteric coated 81 milliGRAM(s) Oral daily  atorvastatin 40 milliGRAM(s) Oral at bedtime  insulin glargine Injectable (LANTUS) 50 Unit(s) SubCutaneous at bedtime  insulin lispro (HumaLOG) corrective regimen sliding scale   SubCutaneous three times a day before meals  insulin lispro Injectable (HumaLOG) 15 Unit(s) SubCutaneous three times a day before meals  levoFLOXacin IVPB 750 milliGRAM(s) IV Intermittent every 24 hours  lisinopril 40 milliGRAM(s) Oral daily  meropenem  IVPB 1000 milliGRAM(s) IV Intermittent every 8 hours  NIFEdipine XL 60 milliGRAM(s) Oral daily  pantoprazole    Tablet 40 milliGRAM(s) Oral before breakfast    MEDICATIONS  (PRN):  acetaminophen   Tablet .. 650 milliGRAM(s) Oral every 6 hours PRN Temp greater or equal to 38C (100.4F)      FAMILY HISTORY:  Family history of ischemic heart disease (IHD) (Father)  Family history of lung cancer (Mother)      Allergies    No Known Allergies    Intolerances        SOCIAL HISTORY:    [  ] Etoh  [  ] Smoking  [  ] Substance abuse     Home Environment:  [  ] Home Alone  [  ] Lives with Family  [  ] Home Health Aid    Dwelling:  [  ] Apartment  [x  ] Private House  [  ] Adult Home  [  ] Skilled Nursing Facility      [  ] Short Term  [  ] Long Term  [  ] Stairs       Elevator [  ]    FUNCTIONAL STATUS PTA: (Check all that apply)  Ambulation: [ x  ]Independent    [  ] Dependent     [  ] Non-Ambulatory  Assistive Device: [  ] SA Cane  [  ]  Q Cane  [  ] Walker  [ x ]  Wheelchair  ADL : [  ] Independent  [  ]  Dependent       Vital Signs Last 24 Hrs  T(C): 37.5 (12 Dec 2019 13:46), Max: 37.8 (12 Dec 2019 04:46)  T(F): 99.5 (12 Dec 2019 13:46), Max: 100 (12 Dec 2019 04:46)  HR: 95 (12 Dec 2019 13:46) (86 - 108)  BP: 108/61 (12 Dec 2019 13:46) (108/61 - 162/81)  BP(mean): --  RR: 18 (12 Dec 2019 13:46) (14 - 20)  SpO2: 98% (11 Dec 2019 20:05) (97% - 100%)      PHYSICAL EXAM: Alert & Oriented X3  GENERAL: NAD, well-groomed, well-developed  HEAD:  Atraumatic, Normocephalic  EYES: EOMI  CHEST/LUNG: Clear  HEART: Regular rate and rhythm;  EXTREMITIES: rt foot with post op dressing..    NERVOUS SYSTEM:  Cranial Nerves 2-12 intact [x  ] Abnormal  [  ]  ROM: WFL all extremities [x  ]  Abnormal [  ]  Motor Strength: WFL all extremities  [ x ]  Abnormal [  ]  Sensation: intact to light touch [  ] Abnormal [  ]  Reflexes: Symmetric [  ]  Abnormal [  ]    FUNCTIONAL STATUS:  Bed Mobility: Independent [x  ]  Supervision [  ]  Needs Assistance [  ]  N/A [  ]  Transfers: Independent [x  ]  Supervision [  ]  Needs Assistance [  ]  N/A [  ]   Ambulation: Independent [ x ]  Supervision [  ]  Needs Assistance [  ]  N/A [  ]  ADL: Independent [ x ] Requires Assistance [  ] N/A [  ]      LABS:                        8.9    10.08 )-----------( 416      ( 12 Dec 2019 06:09 )             28.4     12-12    137  |  101  |  11  ----------------------------<  184<H>  4.7   |  25  |  0.8    Ca    8.7      12 Dec 2019 06:09  Mg     1.7     12-12    TPro  6.2  /  Alb  3.0<L>  /  TBili  0.3  /  DBili  x   /  AST  12  /  ALT  7   /  AlkPhos  242<H>  12-12          RADIOLOGY & ADDITIONAL STUDIES:    Assesment:

## 2019-12-12 NOTE — DISCHARGE NOTE PROVIDER - CARE PROVIDER_API CALL
Royal Bhatia (DPJAIRO)  Podiatric Medicine  [address] [facility] [place] [address]  Phone: [phone]  Fax: [phone]  Follow Up Time: 1 week    Jabari Espinosa)  Surgery; Vascular Surgery  [address] [place] [address]  Phone: [phone]  Fax: [phone]  Follow Up Time: 1 week

## 2019-12-12 NOTE — PROGRESS NOTE ADULT - SUBJECTIVE AND OBJECTIVE BOX
JOSÉ MANUEL PORTER  69y, Male  Allergy: No Known Allergies      CHIEF COMPLAINT: Worsening Right Diabetic Foot Ulcer (12 Dec 2019 10:14)      INTERVAL EVENTS/HPI  - No acute events overnight, s/p TMA  - T(F): , Max: 100 (12-12-19 @ 04:46)  - Denies any worsening symptoms  - Tolerating medication  - WBC Count: 10.08 (12-12-19 @ 06:09)  WBC Count: 7.63 (12-11-19 @ 06:27)  - Creatinine, Serum: 0.8 (12-12-19 @ 06:09)  Creatinine, Serum: 0.9 (12-11-19 @ 06:27)       ROS  General: Denies rigors, nightsweats  HEENT: Denies headache, rhinorrhea, sore throat, eye pain  CV: Denies CP, palpitations  PULM: Denies wheezing, hemoptysis  GI: Denies hematemesis, hematochezia, melena  : Denies discharge, hematuria  MSK: Denies arthralgias, myalgias  SKIN: Denies rash, lesions  NEURO: Denies paresthesias, weakness  PSYCH: Denies depression, anxiety    VITALS:  T(F): 100, Max: 100 (12-12-19 @ 04:46)  HR: 108  BP: 162/81  RR: 18Vital Signs Last 24 Hrs  T(C): 37.8 (12 Dec 2019 04:46), Max: 37.8 (12 Dec 2019 04:46)  T(F): 100 (12 Dec 2019 04:46), Max: 100 (12 Dec 2019 04:46)  HR: 108 (12 Dec 2019 04:46) (86 - 108)  BP: 162/81 (12 Dec 2019 04:46) (133/70 - 162/81)  BP(mean): --  RR: 18 (12 Dec 2019 04:46) (14 - 20)  SpO2: 98% (11 Dec 2019 20:05) (97% - 100%)    PHYSICAL EXAM:  Gen: NAD, resting in bed  HEENT: Normocephalic, atraumatic  Neck: supple, no lymphadenopathy  CV: Regular rate & regular rhythm  Lungs: decreased BS at bases  Abdomen: Soft, BS present  Ext: Warm, well perfused, R dressings  Neuro: non focal, awake  Skin: no rash, no erythema    FH: Non-contributory  Social Hx: Non-contributory    TESTS & MEASUREMENTS:                        8.9    10.08 )-----------( 416      ( 12 Dec 2019 06:09 )             28.4     12-12    137  |  101  |  11  ----------------------------<  184<H>  4.7   |  25  |  0.8    Ca    8.7      12 Dec 2019 06:09  Mg     1.7     12-12    TPro  6.2  /  Alb  3.0<L>  /  TBili  0.3  /  DBili  x   /  AST  12  /  ALT  7   /  AlkPhos  242<H>  12-12    eGFR if Non African American: 90 mL/min/1.73M2 (12-12-19 @ 06:09)  eGFR if : 104 mL/min/1.73M2 (12-12-19 @ 06:09)    LIVER FUNCTIONS - ( 12 Dec 2019 06:09 )  Alb: 3.0 g/dL / Pro: 6.2 g/dL / ALK PHOS: 242 U/L / ALT: 7 U/L / AST: 12 U/L / GGT: x                     INFECTIOUS DISEASES TESTING      RADIOLOGY & ADDITIONAL TESTS:  I have personally reviewed the last Chest xray  CXR      CT      CARDIOLOGY TESTING  Transthoracic Echocardiogram:    EXAM:  2-D ECHO (TTE) COMPLETE        PROCEDURE DATE:  12/04/2019      INTERPRETATION:  REPORT:    TRANSTHORACIC ECHOCARDIOGRAM REPORT         Patient Name:   JOSÉ MANUEL PORTER Accession #: [id number]  Medical Rec #:  [id number]       Height:      71.5 in 181.6 cm  YOB: 1950      Weight:      216.0 lb 97.98 kg  Patient Age:    69 years       BSA:         2.19 m²  Patient Gender: M              BP:          111/58 mmHg       Date of Exam:        12/4/2019 12:23:18 PM  Referring Physician: [id number] ED UNASSIGNED  Sonographer:         Andrea Albright  Reading Physician:   Misha Saeed M.D.    Procedure: 2D Echo/Doppler/Color Doppler Complete.  Diagnosis: Shortness of breath - R06.02         Summary:   1. Left ventricular ejection fraction, by visual estimation, is 55 to   60%.   2. Spectral Doppler shows impaired relaxation pattern of left   ventricular myocardial filling (Grade I diastolic dysfunction).   3. Estimated pulmonary artery systolic pressure is 36.1 mmHg assuming a   right atrial pressure of 3 mmHg, which is consistent with borderline   pulmonary hypertension.    PHYSICIAN INTERPRETATION:  Left Ventricle: The left ventricular internal cavity size is normal. Left   ventricular wall thickness is normal. Left ventricular ejection fraction,   by visual estimation, is 55 to 60%. Spectral Doppler shows impaired   relaxation pattern of left ventricular myocardial filling (Grade I   diastolic dysfunction).  Right Ventricle: Normal right ventricular size and function.  Left Atrium: Normal left atrial size.  Right Atrium: Normal right atrial size.  Pericardium: There is no evidence of pericardial effusion.  Mitral Valve: Structurally normal mitral valve, with normal leaflet   excursion. The mitral valve is normal in structure. No evidence of mitral   valve regurgitation is seen.  Tricuspid Valve: Structurally normal tricuspid valve, with normal leaflet   excursion. The tricuspid valve is normal in structure. No tricuspid   regurgitation is visualized. Estimated pulmonary artery systolic pressure   is 36.1 mmHg assuming a right atrial pressure of 3 mmHg, which is   consistent with borderline pulmonary hypertension.  Aortic Valve: Normal trileaflet aortic valve with normal opening. The   aortic valve is normal. No evidence of aortic valve regurgitation is seen.  Pulmonic Valve: Structurally normal pulmonic valve, with normal leaflet   excursion. The pulmonic valve is normal. No indication of pulmonic valve   regurgitation.  Aorta: The aortic root and ascending aorta are structurally normal, with   no evidence of dilitation.  Pulmonary Artery: The main pulmonary artery is normal in size.       2D AND M-MODE MEASUREMENTS (normal ranges within parentheses):  Left                  Normal   Aorta/Left             Normal  Ventricle:                     Atrium:  IVSd (2D):  0.97 cm  (0.7-1.1) AoV Cusp       2.23  (1.5-2.6)  LVPWd       1.48 cm  (0.7-1.1) Separation:     cm  (2D):                          Left Atrium    4.55  (1.9-4.0)  LVIDd       3.95 cm  (3.4-5.7) (Mmode):        cm  (2D):                          LA Volume      18.8  LVIDs       2.83 cm            Index         ml/m²  (2D):                          Right  LV FS       28.3 %    (>25%)   Ventricle:  (2D):                          RVd (2D):    3.64 cm  IVSd        0.86 cm  (0.7-1.1)  (Mmode):  LVPWd       0.99 cm  (0.7-1.1)  (Mmode):  LVIDd       4.96 cm  (3.4-5.7)  (Mmode):  LVIDs       2.99 cm  (Mmode):  LV FS       39.7 %    (>25%)  (Mmode):  Relative     0.75     (<0.42)  Wall  Thickness  Rel. Wall    0.40     (<0.42)  Thickness  Mm  LV Mass    73.7 g/m²  Index:  Mmode    SPECTRAL DOPPLER ANALYSIS:  LV DIASTOLIC FUNCTION:  MV Peak E: 1.05 m/s Decel Time: 163 msec  MV Peak A: 0.94 m/s  E/A Ratio: 1.11    Aortic Valve:  AoV VMax:   1.32 m/s AoV Area, Vmax: 2.92 cm² Vmax Indx: 1.33 cm²/m²  AoV Pk Grad: 7.0 mmHg    LVOT Vmax: 1.09 m/s  LVOT VTI:  0.25 m  LVOT Diam: 2.13 cm    Mitral Valve:  MV P1/2 Time: 47.32 msec  MV Area, PHT: 4.65 cm²    Tricuspid Valve and PA/RV Systolic Pressure: TR Max Velocity: 2.88 m/s RA   Pressure: 3 mmHg RVSP/PASP: 36.1 mmHg    Pulmonic Valve:  PV Max Velocity: 0.90 m/s PV Max PG: 3.3 mmHg PV Mean PG:       [id number] Misha Saeed M.D., Electronically signed on 12/4/2019 at 2:35:33   PM              *** Final ***                    MISHA SAEED MD  This document has been electronically signed. Dec  4 2019 12:23PM             (12-04-19 @ 12:23)  12 Lead ECG:   Ventricular Rate 87 BPM    Atrial Rate 87 BPM    P-R Interval 132 ms    QRS Duration 66 ms    Q-T Interval 372 ms    QTC Calculation(Bezet) 447 ms    P Axis 64 degrees    R Axis 24 degrees    T Axis 51 degrees    Diagnosis Line Normal sinus rhythm  Nonspecific T wave abnormality  Abnormal ECG    Confirmed by DAVE COWAN MD ([id number]) on 12/3/2019 3:27:36 PM (12-03-19 @ 11:55)      MEDICATIONS  aspirin enteric coated 81  atorvastatin 40  insulin glargine Injectable (LANTUS) 50  insulin lispro (HumaLOG) corrective regimen sliding scale   insulin lispro Injectable (HumaLOG) 15  levoFLOXacin IVPB 750  lisinopril 40  meropenem  IVPB 1000  NIFEdipine XL 60  pantoprazole    Tablet 40      ANTIBIOTICS:  levoFLOXacin IVPB 750 milliGRAM(s) IV Intermittent every 24 hours  meropenem  IVPB 1000 milliGRAM(s) IV Intermittent every 8 hours      All available historical records have been reviewed

## 2019-12-12 NOTE — CONSULT NOTE ADULT - ASSESSMENT
IMPRESSION: Rehab of right 4th toe partial amputation/ debridement for osteomyelitis. He is s/p recent 3 rd toe amputation and has been home and ambulating RLE non- weight bearing w/out difficulty. PT will eval tomorrow and he should be able to go home as before ambulating with a walker.    PRECAUTIONS: [  ] Cardiac  [  ] Respiratory  [  ] Seizures [  ] Contact Isolation  [  ] Droplet Isolation  [  ] Other    Weight Bearing Status: NWB Rt leg    RECOMMENDATION:    Out of Bed to Chair     DVT/Decubiti Prophylaxis    REHAB PLAN:     [x   ] Bedside P/T 3-5 times a week   [   ]   Bedside O/T  2-3 times a week             [   ] No Rehab Therapy Indicated                   [   ]  Speech Therapy   Conditioning/ROM                                    ADL  Bed Mobility                                               Conditioning/ROM  Transfers                                                     Bed Mobility  Sitting /Standing Balance                         Transfers                                        Gait Training                                               Sitting/Standing Balance  Stair Training [   ]Applicable                    Home equipment Eval                                                                        Splinting  [   ] Only      GOALS:   ADL   [   ]   Independent                    Transfers  [   ] Independent                          Ambulation  [   ] Independent     [    ] With device                            [   ]  CG                                                         [   ]  CG                                                                  [   ] CG                            [    ] Min A                                                   [   ] Min A                                                              [   ] Min  A          DISCHARGE PLAN:   [   ]  Good candidate for Intensive Rehabilitation/Hospital based-4A [facility]                                             Will tolerate 3hrs Intensive Rehab Daily                                       [    ]  Short Term Rehab in Skilled Nursing Facility                                       [  x  ]  Home with VN services                                         [    ]  Possible Candidate for Intensive Hospital based Rehab
ASSESSMENT  70 y/o Male with a PMH of DM, Previous DFU's, PVD, HTN, HLD, GERD, Depression. s/p Right Hallux Amputation, recent admission for plantar ulcer after stepping on a screw, d/c with PICC    IMPRESSION  #Gangrenous Right 3rd Digit w/ Chronic Diabetic Pressure Ulcer Plantar Aspect  #Previous Admission; Grew MSSA; (PICC 11/14-11/21); Ceftriaxone 2g QD and Flagyl 0.5g BID  #DM    RECOMMENDATIONS  - Follow Up w/ OR Cultures  - Continue w/ Ceftriaxone 2g q24 and Flagyl 500mg Q8   - has a picc line, clean  - Podiatry; Surgery Scheduled for Right 3rd Digit Amputation w/ Possible 2nd Digit

## 2019-12-12 NOTE — PROGRESS NOTE ADULT - ASSESSMENT
ASSESSMENT  68 y/o Male with a PMH of DM, Previous DFU's, PVD, HTN, HLD, GERD, Depression. s/p Right Hallux Amputation, recent admission for plantar ulcer after stepping on a screw, d/c with PICC    IMPRESSION  #Gangrenous Right 3rd Digit w/ Chronic Diabetic Pressure Ulcer Plantar Aspect    Closure, wound, delayed 11-Dec-2019 19:04:50 Partial amputation of fifth ray of right foot by open approach 11-Dec-2019 19:04:43 Partial amputation of fourth ray of right foot by open approach 11-Dec-2019 19:04:25 Partial amputation of second ray of right foot by open approach 11-Dec-2019    3rd toe cx   Numerous Stenotrophomonas maltophilia    OR cx Enterobacter (R cefepime, zosyn, S fluoro/bactrim)    12/5 s/p Ray amputation of third toe of right foot   #Previous Admission; Grew MSSA; (PICC 11/14-11/21); Ceftriaxone 2g QD and Flagyl 0.5g BID  #DM    RECOMMENDATIONS  - Belen 1g q8h IV  - levaquin 750mg daily PO for Stenotrophomonas   - has a picc line, clean  - as good margins, prior to d/c can remove the PICC and change to PO levaquin/flagyl , likely plan for 7-14 days post-op pending clinical course

## 2019-12-12 NOTE — DISCHARGE NOTE PROVIDER - NSDCMRMEDTOKEN_GEN_ALL_CORE_FT
Aspirin Enteric Coated 81 mg oral delayed release tablet: 1 tab(s) orally once a day   benazepril 40 mg oral tablet: 1 tab(s) orally once a day  Crestor 10 mg oral tablet: 1 tab(s) orally once a day (at bedtime)  HumuLIN 70/30 KwikPen 70 units-30 units/mL subcutaneous suspension: 50 unit(s) subcutaneous once a day before breakfast  HumuLIN 70/30 subcutaneous suspension: 40 unit(s) subcutaneous once a day (at bedtime)  Janumet 50 mg-1000 mg oral tablet: 1 tab(s) orally 2 times a day  metroNIDAZOLE 500 mg oral tablet: 1 tab(s) orally every 12 hours until 12/27/2019  NIFEdipine 60 mg oral tablet, extended release: 1 tab(s) orally once a day  omeprazole 20 mg oral delayed release capsule: 1 cap(s) orally once a day Aspirin Enteric Coated 81 mg oral delayed release tablet: 1 tab(s) orally once a day   benazepril 40 mg oral tablet: 1 tab(s) orally once a day  Crestor 10 mg oral tablet: 1 tab(s) orally once a day (at bedtime)  HumuLIN 70/30 KwikPen 70 units-30 units/mL subcutaneous suspension: 50 unit(s) subcutaneous once a day before breakfast  HumuLIN 70/30 subcutaneous suspension: 40 unit(s) subcutaneous once a day (at bedtime)  Janumet 50 mg-1000 mg oral tablet: 1 tab(s) orally 2 times a day  levoFLOXacin 750 mg oral tablet: 1 tab(s) orally every 24 hours  metroNIDAZOLE 500 mg oral tablet: 1 tab(s) orally 2 times a day  NIFEdipine 60 mg oral tablet, extended release: 1 tab(s) orally once a day  omeprazole 20 mg oral delayed release capsule: 1 cap(s) orally once a day Aspirin Enteric Coated 81 mg oral delayed release tablet: 1 tab(s) orally once a day   benazepril 40 mg oral tablet: 1 tab(s) orally once a day  Crestor 10 mg oral tablet: 1 tab(s) orally once a day (at bedtime)  insulin glargine: 30 unit(s) subcutaneous once a day (at bedtime)  insulin lispro: 15 unit(s) subcutaneous 3 times a day (with meals)   Janumet 50 mg-1000 mg oral tablet: 1 tab(s) orally 2 times a day  levoFLOXacin 750 mg oral tablet: 1 tab(s) orally every 24 hours  metroNIDAZOLE 500 mg oral tablet: 1 tab(s) orally 2 times a day  NIFEdipine 60 mg oral tablet, extended release: 1 tab(s) orally once a day  omeprazole 20 mg oral delayed release capsule: 1 cap(s) orally once a day  polyethylene glycol 3350 oral powder for reconstitution: 17 gram(s) orally once a day, As Needed  senna oral tablet: 2 tab(s) orally once a day (at bedtime)

## 2019-12-12 NOTE — PROGRESS NOTE ADULT - ASSESSMENT
69 year-old male with PMH of insulin-dependent DM c/b diabetic foot ulcers s/p amputations of half of left great toe and entire right great toe, PVD, HTN, HLD, GERD, and depression who p/w worsening of a previously diagnosed R third toe wound found to have worsening gangrene requiring amputation of R third toe.    #Right third toe gangrene/ OM/ septic arthritis in setting of PVD in RLE  #2nd MTP septic arthritis   - On admission: Afebrile (Tmax 100.0F), WBC 13.2  - ESR 70, CRP 6.93  - Blood cultures negative, OR culture growing enterobacter (R cefepime, zosyn, S fluoro/bactrim)  - XR Right foot: Gangrene right 3rd toe and recurrent osteomyelitis, worsening right 3rd MTP septic arthritis and osteomyelitis, right 2nd MTP worsened septic arthritis  - S/p Amputation of right third toe on 12/4  - Vascular follow up as outpatient with Dr. Espinosa  - Baseline Hgb ~10, Monitor CBC, Transfuse if <7, Keep active Type and Screen  - Patient has PICC line from previous admission  - Continue Levaquin 750mg IV Daily  - Continue Meropenem 1g IV q8H  - S/p podiatry procedure 12/11    #Insulin-dependent T2DM  - Monitor fingersticks with meals and at bedtime  - Continue Lantus 50 qHS, Lispro 15 TID AC, Sliding scale x2    #Dyspnea with fatigue, loss of appetite x3 days - resolved  - Characterizes dyspnea on exertion. Denies orthopnea or PND. No prior history of CHF  - TTE: EF 55-60%, Grade 1 diastolic dysfunction, borderline pulmonary hypertension  - CXR negative     #TYLER -resolved  - Baseline Cr 0.9-1.1     #Hypertension -well controlled  - Takes Benazepril 40mg Daily at home  - Continue Lisinopril 40mg Daily  - Monitor BP    #Hyperlipidemia  - Continue Atorvastatin 40mg qHS    #GERD  - Continue Protonix    #Depression  - Was taking Sertraline 50mg Daily. Per patient and pharmacy, has not been taking medication since August  - Follow-up outpatient to resume meds  - Monitor for any SI/HI while inpatient    #Pending: Podiatry recommendations  #Disposition: From home  #Diet: DASH/TLC  #GI Prophylaxis: Protonix 40mg PO Daily  #DVT Prophylaxis: Lovenox 40mg SubQ qHS  #Activity: Ambulate as tolerated  #Code Status: Full Code 69 year-old male with PMH of insulin-dependent DM c/b diabetic foot ulcers s/p amputations of half of left great toe and entire right great toe, PVD, HTN, HLD, GERD, and depression who p/w worsening of a previously diagnosed R third toe wound found to have worsening gangrene requiring amputation of R third toe.    #Right third toe gangrene/ OM/ septic arthritis in setting of PVD in RLE  #2nd MTP septic arthritis   - On admission: Afebrile (Tmax 100.0F), WBC 13.2  - ESR 70, CRP 6.93  - Blood cultures negative, OR culture growing enterobacter (R cefepime, zosyn, S fluoro/bactrim)  - XR Right foot: Gangrene right 3rd toe and recurrent osteomyelitis, worsening right 3rd MTP septic arthritis and osteomyelitis, right 2nd MTP worsened septic arthritis  - S/p Amputation of right third toe on 12/4  - Vascular follow up as outpatient with Dr. Espinosa  - Baseline Hgb ~10, Monitor CBC, Transfuse if <7, Keep active Type and Screen  - Patient has PICC line from previous admission  - Continue Levaquin 750mg IV Daily  - Continue Meropenem 1g IV q8H  - S/p podiatry procedure 12/11    #UTI  - Increased frequency, positive dysuria  - Follow up [facility]    #Insulin-dependent T2DM  - Monitor fingersticks with meals and at bedtime  - Continue Lantus 50 qHS, Lispro 15 TID AC, Sliding scale x2    #Dyspnea with fatigue, loss of appetite x3 days - resolved  - Characterizes dyspnea on exertion. Denies orthopnea or PND. No prior history of CHF  - TTE: EF 55-60%, Grade 1 diastolic dysfunction, borderline pulmonary hypertension  - CXR negative     #TYLER -resolved  - Baseline Cr 0.9-1.1     #Hypertension -well controlled  - Takes Benazepril 40mg Daily at home  - Continue Lisinopril 40mg Daily  - Monitor BP    #Hyperlipidemia  - Continue Atorvastatin 40mg qHS    #GERD  - Continue Protonix    #Depression  - Was taking Sertraline 50mg Daily. Per patient and pharmacy, has not been taking medication since August  - Follow-up outpatient to resume meds  - Monitor for any SI/HI while inpatient    #Pending: Podiatry recommendations  #Disposition: From home  #Diet: DASH/TLC  #GI Prophylaxis: Protonix 40mg PO Daily  #DVT Prophylaxis: Lovenox 40mg SubQ qHS  #Activity: Ambulate as tolerated  #Code Status: Full Code

## 2019-12-12 NOTE — DISCHARGE NOTE PROVIDER - PROVIDER TOKENS
PROVIDER:[TOKEN:[[id number]],FOLLOWUP:[1 week]],PROVIDER:[TOKEN:[[id number]],FOLLOWUP:[1 week]]

## 2019-12-12 NOTE — PROGRESS NOTE ADULT - SUBJECTIVE AND OBJECTIVE BOX
Please update patient activities if patient may get out of bed and ambulate nwb rt leg, for therapist to be able to ambulate patient tomorrow. Thank you.

## 2019-12-12 NOTE — PROGRESS NOTE ADULT - SUBJECTIVE AND OBJECTIVE BOX
PROGRESS NOTE   Pt seen @ bedside during AM rounds, w/ Attending. S/p TMA, right foot. (DOS: 12/11/2019). Dressing +Clean, +Dry, +Intact. Pt. denies any recent n/f/c/v/sob. Pt. denies any other pedal complaints at this time.      Vital Signs Last 24 Hrs  T(C): 37.8 (12 Dec 2019 04:46), Max: 37.8 (12 Dec 2019 04:46)  T(F): 100 (12 Dec 2019 04:46), Max: 100 (12 Dec 2019 04:46)  HR: 108 (12 Dec 2019 04:46) (86 - 108)  BP: 162/81 (12 Dec 2019 04:46) (133/70 - 162/81)  BP(mean): --  RR: 18 (12 Dec 2019 04:46) (14 - 20)  SpO2: 98% (11 Dec 2019 20:05) (97% - 100%)                          8.9    10.08 )-----------( 416      ( 12 Dec 2019 06:09 )             28.4               12-12    137  |  101  |  11  ----------------------------<  184<H>  4.7   |  25  |  0.8    Ca    8.7      12 Dec 2019 06:09  Mg     1.7     12-12    TPro  6.2  /  Alb  3.0<L>  /  TBili  0.3  /  DBili  x   /  AST  12  /  ALT  7   /  AlkPhos  242<H>  12-12      PHYSICAL EXAM  Right LE Focused:  DERM: Sutures well coapted and intact. No signs of wound dehiscence. No signs of drainage.    Assessment:  S/p TMA, right foot. (DOS: 12/11/2019)    Plan:  Pt. seen and evaluated.  Discussed treatment and condition w/ patient  Changed TLS drain. Applied xeroform/DSD/Kerlix  3cc's of blood drainage noted.  Will re-evaluate drainage again later today.  Req. VNS for dressing changes  Will cont. to monitor surgical site.

## 2019-12-12 NOTE — DISCHARGE NOTE PROVIDER - HOSPITAL COURSE
69 year-old male with PMH of insulin-dependent DM c/b diabetic foot ulcers s/p amputations of half of left great toe and entire right great toe, PVD, HTN, HLD, GERD, and depression who presents with worsening of a previously diagnosed R third toe wound. Patient was recently admitted to [facility] from 11/14-11/21 for worsening of the same diabetic foot ulcer of right foot with OM with cultures growing MSSA; patient had PICC line placed prior to discharge and was receiving PO Flagyl 500 mg BID and IV Rocephin 2 g qD with scheduled ID (Dr. Gary Turpin) follow-up.        Today, patient presents with mild pain and numbness in right third toe. Per patient and wife at bedside, he had appointment with Vascular Surgery scheduled for today but decided to come to the ED due to worsening appearance of toe (was starting to appear darker) in addition to feeling short of breath, fatigue, and loss of appetite for past three days. + Pain in R foot, + loss of appetite, + fatigue, + shortness of breath x3 days. Denies fever/chills/HA/change in vision/rhinorrhea/sore throat/cough/chest pain/abdominal pain/constipation/diarrhea.        Patient was admitted for right third toe gangrene/ OM/ septic arthritis in setting of PVD and RLE#2nd MTP septic arthritis     On admission: Afebrile (Tmax 100.0F), WBC 13.2    ESR 70, CRP 6.93    Blood cultures negative, OR culture growing enterobacter (R cefepime, zosyn, S fluoro/bactrim)    XR Right foot: Gangrene right 3rd toe and recurrent osteomyelitis, worsening right 3rd MTP septic arthritis and osteomyelitis, right 2nd MTP worsened septic arthritis    S/p Amputation of right third toe on 12/4    Vascular follow up as outpatient with Dr. Espinosa    Baseline Hgb ~10    Patient has PICC line from previous admission    Levaquin 750mg IV Daily, Meropenem 1g IV q8H    S/p podiatry procedure 12/11 amputation of 2nd, 4th, 5th right toe    Per ID, can be discharged on PO Levaquin/Flagyl 69 year-old male with PMH of insulin-dependent DM c/b diabetic foot ulcers s/p amputations of half of left great toe and entire right great toe, PVD, HTN, HLD, GERD, and depression who presents with worsening of a previously diagnosed R third toe wound. Patient was recently admitted to [facility] from 11/14-11/21 for worsening of the same diabetic foot ulcer of right foot with OM with cultures growing MSSA; patient had PICC line placed prior to discharge and was receiving PO Flagyl 500 mg BID and IV Rocephin 2 g qD with scheduled ID (Dr. Gary Turpin) follow-up.        Today, patient presents with mild pain and numbness in right third toe. Per patient and wife at bedside, he had appointment with Vascular Surgery scheduled for today but decided to come to the ED due to worsening appearance of toe (was starting to appear darker) in addition to feeling short of breath, fatigue, and loss of appetite for past three days. + Pain in R foot, + loss of appetite, + fatigue, + shortness of breath x3 days. Denies fever/chills/HA/change in vision/rhinorrhea/sore throat/cough/chest pain/abdominal pain/constipation/diarrhea.        Patient was admitted for right third toe gangrene/ OM/ septic arthritis in setting of PVD and RLE#2nd MTP septic arthritis     On admission: Afebrile (Tmax 100.0F), WBC 13.2    ESR 70, CRP 6.93    Blood cultures negative, OR culture growing enterobacter (R cefepime, zosyn, S fluoro/bactrim)    XR Right foot: Gangrene right 3rd toe and recurrent osteomyelitis, worsening right 3rd MTP septic arthritis and osteomyelitis, right 2nd MTP worsened septic arthritis    S/p Amputation of right third toe on 12/4    Vascular follow up as outpatient with Dr. Espinosa    Baseline Hgb ~10    Patient has PICC line from previous admission    Levaquin 750mg IV Daily, Meropenem 1g IV q8H    S/p podiatry procedure 12/11 amputation of 2nd, 4th, 5th right toe    Per ID, can be discharged on PO Levaquin/Flagyl for 7 days from Washout/Closure on 12/18 (to end 12/24) 69 year-old male with PMH of insulin-dependent DM c/b diabetic foot ulcers s/p amputations of half of left great toe and entire right great toe, PVD, HTN, HLD, GERD, and depression who presents with worsening of a previously diagnosed R third toe wound. Patient was recently admitted to [facility] from 11/14-11/21 for worsening of the same diabetic foot ulcer of right foot with OM with cultures growing MSSA; patient had PICC line placed prior to discharge and was receiving PO Flagyl 500 mg BID and IV Rocephin 2 g qD with scheduled ID (Dr. Gary Turpin) follow-up.        Today, patient presents with mild pain and numbness in right third toe. Per patient and wife at bedside, he had appointment with Vascular Surgery scheduled for today but decided to come to the ED due to worsening appearance of toe (was starting to appear darker) in addition to feeling short of breath, fatigue, and loss of appetite for past three days. + Pain in R foot, + loss of appetite, + fatigue, + shortness of breath x3 days. Denies fever/chills/HA/change in vision/rhinorrhea/sore throat/cough/chest pain/abdominal pain/constipation/diarrhea.        Patient was admitted for right third toe gangrene/ OM/ septic arthritis in setting of PVD and RLE#2nd MTP septic arthritis     On admission: Afebrile (Tmax 100.0F), WBC 13.2    ESR 70, CRP 6.93    Blood cultures negative, OR culture growing enterobacter (R cefepime, zosyn, S fluoro/bactrim)    XR Right foot: Gangrene right 3rd toe and recurrent osteomyelitis, worsening right 3rd MTP septic arthritis and osteomyelitis, right 2nd MTP worsened septic arthritis    S/p Amputation of right third toe on 12/4    Vascular follow up as outpatient with Dr. Espinosa    Baseline Hgb ~10    Patient has PICC line from previous admission    Levaquin 750mg IV Daily, Meropenem 1g IV q8H    S/p podiatry procedure 12/11 amputation of 2nd, 4th, 5th right toe    Per ID, can be discharged on PO Levaquin/Flagyl for 7 days from Washout/Closure on 12/18 (to end 12/24)            Attending Note:        Patient seen and examined independently. I personally had a face-to-face encounter with the patient, examined the patient myself and reviewed the plan of care with the housestaff. Agree with Resident's note but my note supersedes the resident's note in matters hereby listed.         S : No CP/SOB    Other ROS neg.        Corroborate with above exam.        Labs/Rad : Reviewed.         Patient s/p procedures for DFU/OM/Septic arthritis of right 2,4,5 toes.     D/c today on PO Abx. f/u podiatry, PMD.     PICC d/jenny.     STR.

## 2019-12-12 NOTE — DISCHARGE NOTE PROVIDER - CARE PROVIDERS DIRECT ADDRESSES
[email or web address] of [email or web address]

## 2019-12-12 NOTE — DISCHARGE NOTE PROVIDER - NSDCCPCAREPLAN_GEN_ALL_CORE_FT
PRINCIPAL DISCHARGE DIAGNOSIS  Diagnosis: Diabetic foot ulcer  Assessment and Plan of Treatment: You were admitted for diabetic foot ulcer of your right foot. While in the hospital you were treated with IV antibiotics Levaquin and Meropenem. You went for multiple surgical procedures with Podiatry including amputation of the 2nd, 4th, and 5th toes on your right foot. You will be discharged on oral Levaquin and Flagyl. Please follow up with Podiatry and Vascular after you are discharged from the hospital. PRINCIPAL DISCHARGE DIAGNOSIS  Diagnosis: Diabetic foot ulcer  Assessment and Plan of Treatment: You were admitted for diabetic foot ulcer of your right foot. While in the hospital you were treated with IV antibiotics Levaquin and Meropenem. You went for multiple surgical procedures with Podiatry including amputation of the 2nd, 4th, and 5th toes on your right foot. You will be discharged on oral Levaquin 750mg daily and Flagyl 500mg twice a day until Dec 24th. Please follow up with Podiatry and Vascular after you are discharged from the hospital.      SECONDARY DISCHARGE DIAGNOSES  Diagnosis: Diabetes  Assessment and Plan of Treatment: Your previous insulin regimen prior to admission was Humalin 70/30, 50 units before breakfast and 40 units at bedtime, and Janumet 50mg-1000mg twice a day. In the hospital you have been receiving Lantus 30 units at bedtime and Lispro 15 units three times a day with meals. Please continue current regimen while in the nursing home, and reassess upon discharge to home.

## 2019-12-13 LAB
ALBUMIN SERPL ELPH-MCNC: 2.9 G/DL — LOW (ref 3.5–5.2)
ALP SERPL-CCNC: 212 U/L — HIGH (ref 30–115)
ALT FLD-CCNC: 6 U/L — SIGNIFICANT CHANGE UP (ref 0–41)
ANION GAP SERPL CALC-SCNC: 12 MMOL/L — SIGNIFICANT CHANGE UP (ref 7–14)
AST SERPL-CCNC: 10 U/L — SIGNIFICANT CHANGE UP (ref 0–41)
BASOPHILS # BLD AUTO: 0.03 K/UL — SIGNIFICANT CHANGE UP (ref 0–0.2)
BASOPHILS NFR BLD AUTO: 0.3 % — SIGNIFICANT CHANGE UP (ref 0–1)
BILIRUB SERPL-MCNC: 0.3 MG/DL — SIGNIFICANT CHANGE UP (ref 0.2–1.2)
BUN SERPL-MCNC: 11 MG/DL — SIGNIFICANT CHANGE UP (ref 10–20)
CALCIUM SERPL-MCNC: 8.9 MG/DL — SIGNIFICANT CHANGE UP (ref 8.5–10.1)
CHLORIDE SERPL-SCNC: 106 MMOL/L — SIGNIFICANT CHANGE UP (ref 98–110)
CO2 SERPL-SCNC: 26 MMOL/L — SIGNIFICANT CHANGE UP (ref 17–32)
CREAT SERPL-MCNC: 0.9 MG/DL — SIGNIFICANT CHANGE UP (ref 0.7–1.5)
EOSINOPHIL # BLD AUTO: 0.02 K/UL — SIGNIFICANT CHANGE UP (ref 0–0.7)
EOSINOPHIL NFR BLD AUTO: 0.2 % — SIGNIFICANT CHANGE UP (ref 0–8)
GLUCOSE BLDC GLUCOMTR-MCNC: 172 MG/DL — HIGH (ref 70–99)
GLUCOSE BLDC GLUCOMTR-MCNC: 213 MG/DL — HIGH (ref 70–99)
GLUCOSE BLDC GLUCOMTR-MCNC: 260 MG/DL — HIGH (ref 70–99)
GLUCOSE BLDC GLUCOMTR-MCNC: 52 MG/DL — LOW (ref 70–99)
GLUCOSE BLDC GLUCOMTR-MCNC: 82 MG/DL — SIGNIFICANT CHANGE UP (ref 70–99)
GLUCOSE SERPL-MCNC: 57 MG/DL — LOW (ref 70–99)
HCT VFR BLD CALC: 27.4 % — LOW (ref 42–52)
HGB BLD-MCNC: 8.7 G/DL — LOW (ref 14–18)
IMM GRANULOCYTES NFR BLD AUTO: 0.9 % — HIGH (ref 0.1–0.3)
LYMPHOCYTES # BLD AUTO: 0.82 K/UL — LOW (ref 1.2–3.4)
LYMPHOCYTES # BLD AUTO: 8.5 % — LOW (ref 20.5–51.1)
MAGNESIUM SERPL-MCNC: 2 MG/DL — SIGNIFICANT CHANGE UP (ref 1.8–2.4)
MCHC RBC-ENTMCNC: 28.2 PG — SIGNIFICANT CHANGE UP (ref 27–31)
MCHC RBC-ENTMCNC: 31.8 G/DL — LOW (ref 32–37)
MCV RBC AUTO: 88.7 FL — SIGNIFICANT CHANGE UP (ref 80–94)
MONOCYTES # BLD AUTO: 0.94 K/UL — HIGH (ref 0.1–0.6)
MONOCYTES NFR BLD AUTO: 9.7 % — HIGH (ref 1.7–9.3)
NEUTROPHILS # BLD AUTO: 7.75 K/UL — HIGH (ref 1.4–6.5)
NEUTROPHILS NFR BLD AUTO: 80.4 % — HIGH (ref 42.2–75.2)
NRBC # BLD: 0 /100 WBCS — SIGNIFICANT CHANGE UP (ref 0–0)
PLATELET # BLD AUTO: 389 K/UL — SIGNIFICANT CHANGE UP (ref 130–400)
POTASSIUM SERPL-MCNC: 4.5 MMOL/L — SIGNIFICANT CHANGE UP (ref 3.5–5)
POTASSIUM SERPL-SCNC: 4.5 MMOL/L — SIGNIFICANT CHANGE UP (ref 3.5–5)
PROT SERPL-MCNC: 6.1 G/DL — SIGNIFICANT CHANGE UP (ref 6–8)
RBC # BLD: 3.09 M/UL — LOW (ref 4.7–6.1)
RBC # FLD: 13.9 % — SIGNIFICANT CHANGE UP (ref 11.5–14.5)
SODIUM SERPL-SCNC: 144 MMOL/L — SIGNIFICANT CHANGE UP (ref 135–146)
SURGICAL PATHOLOGY STUDY: SIGNIFICANT CHANGE UP
WBC # BLD: 9.65 K/UL — SIGNIFICANT CHANGE UP (ref 4.8–10.8)
WBC # FLD AUTO: 9.65 K/UL — SIGNIFICANT CHANGE UP (ref 4.8–10.8)

## 2019-12-13 PROCEDURE — 99233 SBSQ HOSP IP/OBS HIGH 50: CPT

## 2019-12-13 RX ORDER — ENOXAPARIN SODIUM 100 MG/ML
40 INJECTION SUBCUTANEOUS AT BEDTIME
Refills: 0 | Status: DISCONTINUED | OUTPATIENT
Start: 2019-12-13 | End: 2019-12-17

## 2019-12-13 RX ORDER — INSULIN LISPRO 100/ML
VIAL (ML) SUBCUTANEOUS
Refills: 0 | Status: DISCONTINUED | OUTPATIENT
Start: 2019-12-13 | End: 2019-12-17

## 2019-12-13 RX ORDER — ALTEPLASE 100 MG
2 KIT INTRAVENOUS ONCE
Refills: 0 | Status: COMPLETED | OUTPATIENT
Start: 2019-12-13 | End: 2019-12-13

## 2019-12-13 RX ORDER — ACETAMINOPHEN 500 MG
650 TABLET ORAL EVERY 6 HOURS
Refills: 0 | Status: DISCONTINUED | OUTPATIENT
Start: 2019-12-13 | End: 2019-12-17

## 2019-12-13 RX ORDER — INSULIN GLARGINE 100 [IU]/ML
45 INJECTION, SOLUTION SUBCUTANEOUS AT BEDTIME
Refills: 0 | Status: DISCONTINUED | OUTPATIENT
Start: 2019-12-13 | End: 2019-12-16

## 2019-12-13 RX ADMIN — ATORVASTATIN CALCIUM 40 MILLIGRAM(S): 80 TABLET, FILM COATED ORAL at 22:00

## 2019-12-13 RX ADMIN — MEROPENEM 100 MILLIGRAM(S): 1 INJECTION INTRAVENOUS at 14:11

## 2019-12-13 RX ADMIN — ENOXAPARIN SODIUM 40 MILLIGRAM(S): 100 INJECTION SUBCUTANEOUS at 22:00

## 2019-12-13 RX ADMIN — Medication 15 UNIT(S): at 17:22

## 2019-12-13 RX ADMIN — Medication 15 UNIT(S): at 11:33

## 2019-12-13 RX ADMIN — Medication 650 MILLIGRAM(S): at 23:20

## 2019-12-13 RX ADMIN — INSULIN GLARGINE 45 UNIT(S): 100 INJECTION, SOLUTION SUBCUTANEOUS at 22:00

## 2019-12-13 RX ADMIN — MEROPENEM 100 MILLIGRAM(S): 1 INJECTION INTRAVENOUS at 21:59

## 2019-12-13 RX ADMIN — Medication 650 MILLIGRAM(S): at 22:46

## 2019-12-13 RX ADMIN — PANTOPRAZOLE SODIUM 40 MILLIGRAM(S): 20 TABLET, DELAYED RELEASE ORAL at 05:27

## 2019-12-13 RX ADMIN — Medication 3: at 11:33

## 2019-12-13 RX ADMIN — Medication 60 MILLIGRAM(S): at 05:27

## 2019-12-13 RX ADMIN — MEROPENEM 100 MILLIGRAM(S): 1 INJECTION INTRAVENOUS at 05:26

## 2019-12-13 RX ADMIN — Medication 1: at 17:22

## 2019-12-13 RX ADMIN — LISINOPRIL 40 MILLIGRAM(S): 2.5 TABLET ORAL at 05:27

## 2019-12-13 RX ADMIN — Medication 81 MILLIGRAM(S): at 11:32

## 2019-12-13 NOTE — PHYSICAL THERAPY INITIAL EVALUATION ADULT - MANUAL MUSCLE TESTING RESULTS, REHAB EVAL
B UE's at least 3+/5. R LE at least 3/5 did not test further 2* to NWB precautions. L LE at least 3/5.

## 2019-12-13 NOTE — PROGRESS NOTE ADULT - ASSESSMENT
69 year-old male with PMH of insulin-dependent DM c/b diabetic foot ulcers s/p amputations of half of left great toe and entire right great toe, PVD, HTN, HLD, GERD, and depression who p/w worsening of a previously diagnosed R third toe wound found to have worsening gangrene requiring amputation of R third toe.    #Right third toe gangrene/ OM/ septic arthritis in setting of PVD in RLE  #2nd MTP septic arthritis   - On admission: Afebrile (Tmax 100.0F), WBC 13.2  - ESR 70, CRP 6.93  - Blood cultures negative, OR culture growing enterobacter (R cefepime, zosyn, S fluoro/bactrim)  - XR Right foot: Gangrene right 3rd toe and recurrent osteomyelitis, worsening right 3rd MTP septic arthritis and osteomyelitis, right 2nd MTP worsened septic arthritis  - S/p Amputation of right third toe on 12/4  - Vascular follow up as outpatient with Dr. Espinosa  - Baseline Hgb ~10, Monitor CBC, Transfuse if <7, Keep active Type and Screen  - Patient has PICC line from previous admission, can be removed prior to discharge  - S/p podiatry procedure 12/11  - Per ID, continue Levaquin 750mg IV Daily and Meropenem 1g IV q8H, can be discharged on PO Levaquin/Flagyl    #UTI  - Increased frequency, positive dysuria  - UA negative    #Insulin-dependent T2DM  - Monitor fingersticks with meals and at bedtime  - Continue Lantus 50 qHS, Lispro 15 TID AC, Sliding scale x2    #Dyspnea with fatigue, loss of appetite x3 days - resolved  - Characterizes dyspnea on exertion. Denies orthopnea or PND. No prior history of CHF  - TTE: EF 55-60%, Grade 1 diastolic dysfunction, borderline pulmonary hypertension  - CXR negative     #TYLER -resolved  - Baseline Cr 0.9-1.1     #Hypertension -well controlled  - Takes Benazepril 40mg Daily at home  - Continue Lisinopril 40mg Daily  - Monitor BP    #Hyperlipidemia  - Continue Atorvastatin 40mg qHS    #GERD  - Continue Protonix    #Depression  - Was taking Sertraline 50mg Daily. Per patient and pharmacy, has not been taking medication since August  - Follow-up outpatient to resume meds  - Monitor for any SI/HI while inpatient    #Pending: Podiatry recommendations, CELE auth  #Disposition: From home, discharge to [facility]  #Diet: DASH/TLC  #GI Prophylaxis: Protonix 40mg PO Daily  #DVT Prophylaxis: Lovenox 40mg SubQ qHS  #Activity: Ambulate as tolerated, NWB RLE  #Code Status: Full Code 69 year-old male with PMH of insulin-dependent DM c/b diabetic foot ulcers s/p amputations of half of left great toe and entire right great toe, PVD, HTN, HLD, GERD, and depression who p/w worsening of a previously diagnosed R third toe wound found to have worsening gangrene requiring amputation of R third toe.    #Right third toe gangrene/ OM/ septic arthritis in setting of PVD in RLE  #2nd MTP septic arthritis   - On admission: Afebrile (Tmax 100.0F), WBC 13.2  - ESR 70, CRP 6.93  - Blood cultures negative, OR culture growing enterobacter (R cefepime, zosyn, S fluoro/bactrim)  - XR Right foot: Gangrene right 3rd toe and recurrent osteomyelitis, worsening right 3rd MTP septic arthritis and osteomyelitis, right 2nd MTP worsened septic arthritis  - S/p Amputation of right third toe on 12/4  - Vascular follow up as outpatient with Dr. Espinosa  - Baseline Hgb ~10, Monitor CBC, Transfuse if <7, Keep active Type and Screen  - Patient has PICC line from previous admission, can be removed prior to discharge  - S/p podiatry procedure 12/11  - Per ID, continue Levaquin 750mg IV Daily and Meropenem 1g IV q8H, can be discharged on PO Levaquin/Flagyl    #UTI  - Increased frequency, positive dysuria  - UA negative    #Insulin-dependent T2DM  - Monitor fingersticks with meals and at bedtime  - Continue Lantus 45 qHS, Lispro 15 TID AC, Sliding scale x1    #Dyspnea with fatigue, loss of appetite x3 days - resolved  - Characterizes dyspnea on exertion. Denies orthopnea or PND. No prior history of CHF  - TTE: EF 55-60%, Grade 1 diastolic dysfunction, borderline pulmonary hypertension  - CXR negative     #TYLER -resolved  - Baseline Cr 0.9-1.1     #Hypertension -well controlled  - Takes Benazepril 40mg Daily at home  - Continue Lisinopril 40mg Daily  - Monitor BP    #Hyperlipidemia  - Continue Atorvastatin 40mg qHS    #GERD  - Continue Protonix    #Depression  - Was taking Sertraline 50mg Daily. Per patient and pharmacy, has not been taking medication since August  - Follow-up outpatient to resume meds  - Monitor for any SI/HI while inpatient    #Pending: Podiatry recommendations, CELE auth  #Disposition: From home, discharge to [facility]  #Diet: DASH/TLC  #GI Prophylaxis: Protonix 40mg PO Daily  #DVT Prophylaxis: Lovenox 40mg SubQ qHS  #Activity: Ambulate as tolerated, NWB RLE  #Code Status: Full Code

## 2019-12-13 NOTE — PHYSICAL THERAPY INITIAL EVALUATION ADULT - CRITERIA FOR SKILLED THERAPEUTIC INTERVENTIONS
therapy frequency/rehab potential/predicted duration of therapy intervention/impairments found/risk reduction/prevention/functional limitations in following categories

## 2019-12-13 NOTE — PHARMACOTHERAPY INTERVENTION NOTE - COMMENTS
DVT prophylaxis recommended
Nasal Bactroban will be changed to a formulary topical mupirocin
left a note for medical rounding team to consider to add heparin sc for dvy prophylaxis

## 2019-12-13 NOTE — PROGRESS NOTE ADULT - SUBJECTIVE AND OBJECTIVE BOX
PROGRESS NOTE   Pt seen @ bedside during AM rounds. S/p TMA, right foot. (DOS: 12/11/2019). Dressing +Clean, +Dry, +Intact. Pt. denies any recent n/f/c/v/sob. Pt. denies any other pedal complaints at this time.        Vital Signs Last 24 Hrs  T(C): 37.1 (13 Dec 2019 06:04), Max: 37.5 (12 Dec 2019 13:46)  T(F): 98.7 (13 Dec 2019 06:04), Max: 99.5 (12 Dec 2019 13:46)  HR: 78 (13 Dec 2019 06:04) (78 - 95)  BP: 126/67 (13 Dec 2019 06:04) (108/61 - 126/67)  BP(mean): --  RR: 18 (13 Dec 2019 06:04) (18 - 18)  SpO2: --                          8.9    10.08 )-----------( 416      ( 12 Dec 2019 06:09 )             28.4               12-12    137  |  101  |  11  ----------------------------<  184<H>  4.7   |  25  |  0.8    Ca    8.7      12 Dec 2019 06:09  Mg     1.7     12-12    TPro  6.2  /  Alb  3.0<L>  /  TBili  0.3  /  DBili  x   /  AST  12  /  ALT  7   /  AlkPhos  242<H>  12-12      PHYSICAL EXAM  Right LE Focused:  DERM: Sutures well coapted and intact. No signs of wound dehiscence. No signs of drainage. No blood in TLC drain container.     Assessment:  S/p TMA, right foot. (DOS: 12/11/2019)    Plan:  Pt. seen and evaluated.  Discussed treatment and condition w/ patient  Changed TLS drain. Applied xeroform/DSD/Kerlix  NO  blood drainage noted.  NWB RLE   Req. VNS for dressing changes  Will cont. to monitor surgical site. PROGRESS NOTE   Pt seen @ bedside during AM rounds. S/p TMA, right foot. (DOS: 12/11/2019). Dressing +Clean, +Dry, +Intact. Pt. denies any recent n/f/c/v/sob. Pt. denies any other pedal complaints at this time.        Vital Signs Last 24 Hrs  T(C): 37.1 (13 Dec 2019 06:04), Max: 37.5 (12 Dec 2019 13:46)  T(F): 98.7 (13 Dec 2019 06:04), Max: 99.5 (12 Dec 2019 13:46)  HR: 78 (13 Dec 2019 06:04) (78 - 95)  BP: 126/67 (13 Dec 2019 06:04) (108/61 - 126/67)  BP(mean): --  RR: 18 (13 Dec 2019 06:04) (18 - 18)  SpO2: --                          8.9    10.08 )-----------( 416      ( 12 Dec 2019 06:09 )             28.4               12-12    137  |  101  |  11  ----------------------------<  184<H>  4.7   |  25  |  0.8    Ca    8.7      12 Dec 2019 06:09  Mg     1.7     12-12    TPro  6.2  /  Alb  3.0<L>  /  TBili  0.3  /  DBili  x   /  AST  12  /  ALT  7   /  AlkPhos  242<H>  12-12      PHYSICAL EXAM  Right LE Focused:  DERM: Sutures well coapted and intact. No signs of wound dehiscence. No signs of drainage. No blood in TLC drain container.     Assessment:  S/p TMA, right foot. (DOS: 12/11/2019)    Plan:  Pt. seen and evaluated.  Discussed treatment and condition w/ patient  Changed TLS drain. Applied xeroform/DSD/Kerlix  Minimal  blood drainage noted.  NWB RLE   Req. VNS for dressing changes  Will cont. to monitor surgical site.  see attending attestation

## 2019-12-13 NOTE — PROGRESS NOTE ADULT - SUBJECTIVE AND OBJECTIVE BOX
Patient Information:  JOSÉ MANUEL PORTER / 69y / Male / MRN#:[id number]    Hospital Day: 10d    HPI:  69 year-old male with PMH of insulin-dependent DM c/b diabetic foot ulcers s/p amputations of half of left great toe and entire right great toe, PVD, HTN, HLD, GERD, and depression who presents with worsening of a previously diagnosed R third toe wound. Patient was recently admitted to [facility] from 11/14-11/21 for worsening of the same diabetic foot ulcer of right foot with OM with cultures growing MSSA; patient had PICC line placed prior to discharge and was receiving PO Flagyl 500 mg BID and IV Rocephin 2 g qD with scheduled ID (Dr. Gary Turpin) follow-up.    Today, patient presents with mild pain and numbness in right third toe. Per patient and wife at bedside, he had appointment with Vascular Surgery scheduled for today but decided to come to the ED due to worsening appearance of toe (was starting to appear darker) in addition to feeling short of breath, fatigue, and loss of appetite for past three days. + Pain in R foot, + loss of appetite, + fatigue, + shortness of breath x3 days. Denies fever/chills/HA/change in vision/rhinorrhea/sore throat/cough/chest pain/abdominal pain/constipation/diarrhea.    Interval History:  Patient seen and examined at bedside. No acute events overnight.    Past Medical History:  GERD (gastroesophageal reflux disease)  Depression  Diabetic foot ulcer  Dyslipidemia  HTN (hypertension)  DM (diabetes mellitus)    Past Surgical History:  Toe amputation status, right    Allergies:  No Known Allergies    Medications:  PRN:  acetaminophen   Tablet .. 650 milliGRAM(s) Oral every 6 hours PRN Temp greater or equal to 38C (100.4F)    Standing:  aspirin enteric coated 81 milliGRAM(s) Oral daily  atorvastatin 40 milliGRAM(s) Oral at bedtime  insulin glargine Injectable (LANTUS) 50 Unit(s) SubCutaneous at bedtime  insulin lispro (HumaLOG) corrective regimen sliding scale   SubCutaneous three times a day before meals  insulin lispro Injectable (HumaLOG) 15 Unit(s) SubCutaneous three times a day before meals  levoFLOXacin IVPB 750 milliGRAM(s) IV Intermittent every 24 hours  lisinopril 40 milliGRAM(s) Oral daily  meropenem  IVPB 1000 milliGRAM(s) IV Intermittent every 8 hours  NIFEdipine XL 60 milliGRAM(s) Oral daily  pantoprazole    Tablet 40 milliGRAM(s) Oral before breakfast    Home:  benazepril 40 mg oral tablet: 1 tab(s) orally once a day  Crestor 10 mg oral tablet: 1 tab(s) orally once a day (at bedtime)  HumuLIN 70/30 KwikPen 70 units-30 units/mL subcutaneous suspension: 50 unit(s) subcutaneous once a day before breakfast  HumuLIN 70/30 subcutaneous suspension: 40 unit(s) subcutaneous once a day (at bedtime)  Janumet 50 mg-1000 mg oral tablet: 1 tab(s) orally 2 times a day  omeprazole 20 mg oral delayed release capsule: 1 cap(s) orally once a day    Vitals:  T(C): 37.1, Max: 37.5 (12-12-19 @ 13:46)  T(F): 98.7, Max: 99.5 (12-12-19 @ 13:46)  HR: 78 (78 - 95)  BP: 126/67 (108/61 - 126/67)  RR: 18 (18 - 18)  SpO2: --    Physical Exam:  General: Awake, Alert. Not in acute distress.  Heart: Regular rate and rhythm; S1, S2; No murmurs.  Lungs: Clear to auscultation bilaterally.  Abdomen: Soft, nontender, nondistended.  Extremities: Right foot wrapped  Neuro: AAOx3, No focal deficits.    Labs:  CBC (12-12 @ 06:09)                        Hgb: 8.9    WBC: 10.08 )-----------------( Plts: 416                              Hct: 28.4     Chem (12-12 @ 06:09)  Na: 137  |     Cl: 101     |  BUN: 11  -----------------------------------------< Gluc: 184    K: 4.7   |    HCO3: 25  |  Cr: 0.8    Ca 8.7 (12-12 @ 06:09)  Mg 1.7 (12-12 @ 06:09)    LFTs (12-12 @ 06:09)  TPro 6.2  /  Alb 3.0  TBili 0.3  /  DBili     AST 12  /  ALT 7   /  AlkPhos 242

## 2019-12-13 NOTE — PHYSICAL THERAPY INITIAL EVALUATION ADULT - DIAGNOSIS, PT EVAL
Rehab gait dysfuntion 2* to right 4th toe partial amputation/ debridement for osteomyelitis. He is s/p recent 3 rd toe amputation.

## 2019-12-14 LAB
ALBUMIN SERPL ELPH-MCNC: 2.7 G/DL — LOW (ref 3.5–5.2)
ALP SERPL-CCNC: 210 U/L — HIGH (ref 30–115)
ALT FLD-CCNC: 8 U/L — SIGNIFICANT CHANGE UP (ref 0–41)
ANION GAP SERPL CALC-SCNC: 12 MMOL/L — SIGNIFICANT CHANGE UP (ref 7–14)
AST SERPL-CCNC: 14 U/L — SIGNIFICANT CHANGE UP (ref 0–41)
BASOPHILS # BLD AUTO: 0.02 K/UL — SIGNIFICANT CHANGE UP (ref 0–0.2)
BASOPHILS NFR BLD AUTO: 0.3 % — SIGNIFICANT CHANGE UP (ref 0–1)
BILIRUB SERPL-MCNC: 0.2 MG/DL — SIGNIFICANT CHANGE UP (ref 0.2–1.2)
BUN SERPL-MCNC: 15 MG/DL — SIGNIFICANT CHANGE UP (ref 10–20)
CALCIUM SERPL-MCNC: 8.6 MG/DL — SIGNIFICANT CHANGE UP (ref 8.5–10.1)
CHLORIDE SERPL-SCNC: 105 MMOL/L — SIGNIFICANT CHANGE UP (ref 98–110)
CO2 SERPL-SCNC: 26 MMOL/L — SIGNIFICANT CHANGE UP (ref 17–32)
CREAT SERPL-MCNC: 0.9 MG/DL — SIGNIFICANT CHANGE UP (ref 0.7–1.5)
EOSINOPHIL # BLD AUTO: 0.12 K/UL — SIGNIFICANT CHANGE UP (ref 0–0.7)
EOSINOPHIL NFR BLD AUTO: 1.5 % — SIGNIFICANT CHANGE UP (ref 0–8)
GLUCOSE BLDC GLUCOMTR-MCNC: 108 MG/DL — HIGH (ref 70–99)
GLUCOSE BLDC GLUCOMTR-MCNC: 208 MG/DL — HIGH (ref 70–99)
GLUCOSE BLDC GLUCOMTR-MCNC: 224 MG/DL — HIGH (ref 70–99)
GLUCOSE BLDC GLUCOMTR-MCNC: 247 MG/DL — HIGH (ref 70–99)
GLUCOSE BLDC GLUCOMTR-MCNC: 274 MG/DL — HIGH (ref 70–99)
GLUCOSE SERPL-MCNC: 138 MG/DL — HIGH (ref 70–99)
HCT VFR BLD CALC: 28.1 % — LOW (ref 42–52)
HGB BLD-MCNC: 8.8 G/DL — LOW (ref 14–18)
IMM GRANULOCYTES NFR BLD AUTO: 1 % — HIGH (ref 0.1–0.3)
LYMPHOCYTES # BLD AUTO: 1.18 K/UL — LOW (ref 1.2–3.4)
LYMPHOCYTES # BLD AUTO: 15 % — LOW (ref 20.5–51.1)
MAGNESIUM SERPL-MCNC: 1.9 MG/DL — SIGNIFICANT CHANGE UP (ref 1.8–2.4)
MCHC RBC-ENTMCNC: 27.8 PG — SIGNIFICANT CHANGE UP (ref 27–31)
MCHC RBC-ENTMCNC: 31.3 G/DL — LOW (ref 32–37)
MCV RBC AUTO: 88.6 FL — SIGNIFICANT CHANGE UP (ref 80–94)
MONOCYTES # BLD AUTO: 0.83 K/UL — HIGH (ref 0.1–0.6)
MONOCYTES NFR BLD AUTO: 10.5 % — HIGH (ref 1.7–9.3)
NEUTROPHILS # BLD AUTO: 5.64 K/UL — SIGNIFICANT CHANGE UP (ref 1.4–6.5)
NEUTROPHILS NFR BLD AUTO: 71.7 % — SIGNIFICANT CHANGE UP (ref 42.2–75.2)
NRBC # BLD: 0 /100 WBCS — SIGNIFICANT CHANGE UP (ref 0–0)
PLATELET # BLD AUTO: 385 K/UL — SIGNIFICANT CHANGE UP (ref 130–400)
POTASSIUM SERPL-MCNC: 4.7 MMOL/L — SIGNIFICANT CHANGE UP (ref 3.5–5)
POTASSIUM SERPL-SCNC: 4.7 MMOL/L — SIGNIFICANT CHANGE UP (ref 3.5–5)
PROT SERPL-MCNC: 5.8 G/DL — LOW (ref 6–8)
RBC # BLD: 3.17 M/UL — LOW (ref 4.7–6.1)
RBC # FLD: 13.7 % — SIGNIFICANT CHANGE UP (ref 11.5–14.5)
SODIUM SERPL-SCNC: 143 MMOL/L — SIGNIFICANT CHANGE UP (ref 135–146)
WBC # BLD: 7.87 K/UL — SIGNIFICANT CHANGE UP (ref 4.8–10.8)
WBC # FLD AUTO: 7.87 K/UL — SIGNIFICANT CHANGE UP (ref 4.8–10.8)

## 2019-12-14 PROCEDURE — 99233 SBSQ HOSP IP/OBS HIGH 50: CPT

## 2019-12-14 RX ORDER — POLYETHYLENE GLYCOL 3350 17 G/17G
17 POWDER, FOR SOLUTION ORAL DAILY
Refills: 0 | Status: DISCONTINUED | OUTPATIENT
Start: 2019-12-14 | End: 2019-12-17

## 2019-12-14 RX ORDER — SENNA PLUS 8.6 MG/1
2 TABLET ORAL AT BEDTIME
Refills: 0 | Status: DISCONTINUED | OUTPATIENT
Start: 2019-12-14 | End: 2019-12-17

## 2019-12-14 RX ORDER — LACTULOSE 10 G/15ML
20 SOLUTION ORAL DAILY
Refills: 0 | Status: DISCONTINUED | OUTPATIENT
Start: 2019-12-14 | End: 2019-12-17

## 2019-12-14 RX ADMIN — Medication 60 MILLIGRAM(S): at 05:11

## 2019-12-14 RX ADMIN — ENOXAPARIN SODIUM 40 MILLIGRAM(S): 100 INJECTION SUBCUTANEOUS at 21:44

## 2019-12-14 RX ADMIN — MEROPENEM 100 MILLIGRAM(S): 1 INJECTION INTRAVENOUS at 05:11

## 2019-12-14 RX ADMIN — Medication 15 UNIT(S): at 17:09

## 2019-12-14 RX ADMIN — Medication 15 UNIT(S): at 11:46

## 2019-12-14 RX ADMIN — ALTEPLASE 2 MILLIGRAM(S): KIT at 00:38

## 2019-12-14 RX ADMIN — MEROPENEM 100 MILLIGRAM(S): 1 INJECTION INTRAVENOUS at 14:39

## 2019-12-14 RX ADMIN — Medication 81 MILLIGRAM(S): at 11:47

## 2019-12-14 RX ADMIN — SENNA PLUS 2 TABLET(S): 8.6 TABLET ORAL at 21:44

## 2019-12-14 RX ADMIN — Medication 2: at 11:46

## 2019-12-14 RX ADMIN — INSULIN GLARGINE 45 UNIT(S): 100 INJECTION, SOLUTION SUBCUTANEOUS at 21:53

## 2019-12-14 RX ADMIN — POLYETHYLENE GLYCOL 3350 17 GRAM(S): 17 POWDER, FOR SOLUTION ORAL at 11:46

## 2019-12-14 RX ADMIN — ATORVASTATIN CALCIUM 40 MILLIGRAM(S): 80 TABLET, FILM COATED ORAL at 21:44

## 2019-12-14 RX ADMIN — PANTOPRAZOLE SODIUM 40 MILLIGRAM(S): 20 TABLET, DELAYED RELEASE ORAL at 05:14

## 2019-12-14 RX ADMIN — Medication 650 MILLIGRAM(S): at 22:30

## 2019-12-14 RX ADMIN — MEROPENEM 100 MILLIGRAM(S): 1 INJECTION INTRAVENOUS at 21:44

## 2019-12-14 RX ADMIN — LACTULOSE 20 GRAM(S): 10 SOLUTION ORAL at 11:46

## 2019-12-14 RX ADMIN — Medication 650 MILLIGRAM(S): at 21:53

## 2019-12-14 RX ADMIN — Medication 2: at 17:09

## 2019-12-14 RX ADMIN — LISINOPRIL 40 MILLIGRAM(S): 2.5 TABLET ORAL at 05:11

## 2019-12-14 NOTE — PROGRESS NOTE ADULT - SUBJECTIVE AND OBJECTIVE BOX
Patient Information:  JOSÉ MANUEL PORTER / 69y / Male / MRN#:[id number]    Hospital Day: 11d    HPI:  69 year-old male with PMH of insulin-dependent DM c/b diabetic foot ulcers s/p amputations of half of left great toe and entire right great toe, PVD, HTN, HLD, GERD, and depression who presents with worsening of a previously diagnosed R third toe wound. Patient was recently admitted to [facility] from 11/14-11/21 for worsening of the same diabetic foot ulcer of right foot with OM with cultures growing MSSA; patient had PICC line placed prior to discharge and was receiving PO Flagyl 500 mg BID and IV Rocephin 2 g qD with scheduled ID (Dr. Gary Turpin) follow-up.    Today, patient presents with mild pain and numbness in right third toe. Per patient and wife at bedside, he had appointment with Vascular Surgery scheduled for today but decided to come to the ED due to worsening appearance of toe (was starting to appear darker) in addition to feeling short of breath, fatigue, and loss of appetite for past three days. + Pain in R foot, + loss of appetite, + fatigue, + shortness of breath x3 days. Denies fever/chills/HA/change in vision/rhinorrhea/sore throat/cough/chest pain/abdominal pain/constipation/diarrhea.    Interval History:  Patient seen and examined at bedside. No acute events overnight. Only complaint this morning is constipation, ordered bowel regimen.    Past Medical History:  GERD (gastroesophageal reflux disease)  Depression  Diabetic foot ulcer  Dyslipidemia  HTN (hypertension)  DM (diabetes mellitus)    Past Surgical History:  Toe amputation status, right    Allergies:  No Known Allergies    Medications:  PRN:  acetaminophen   Tablet .. 650 milliGRAM(s) Oral every 6 hours PRN Mild Pain (1 - 3)    Standing:  aspirin enteric coated 81 milliGRAM(s) Oral daily  atorvastatin 40 milliGRAM(s) Oral at bedtime  enoxaparin Injectable 40 milliGRAM(s) SubCutaneous at bedtime  insulin glargine Injectable (LANTUS) 45 Unit(s) SubCutaneous at bedtime  insulin lispro (HumaLOG) corrective regimen sliding scale   SubCutaneous three times a day before meals  insulin lispro Injectable (HumaLOG) 15 Unit(s) SubCutaneous three times a day before meals  lactulose Syrup 20 Gram(s) Oral daily  levoFLOXacin IVPB 750 milliGRAM(s) IV Intermittent every 24 hours  lisinopril 40 milliGRAM(s) Oral daily  meropenem  IVPB 1000 milliGRAM(s) IV Intermittent every 8 hours  NIFEdipine XL 60 milliGRAM(s) Oral daily  pantoprazole    Tablet 40 milliGRAM(s) Oral before breakfast  polyethylene glycol 3350 17 Gram(s) Oral daily  senna 2 Tablet(s) Oral at bedtime    Home:  benazepril 40 mg oral tablet: 1 tab(s) orally once a day  Crestor 10 mg oral tablet: 1 tab(s) orally once a day (at bedtime)  HumuLIN 70/30 KwikPen 70 units-30 units/mL subcutaneous suspension: 50 unit(s) subcutaneous once a day before breakfast  HumuLIN 70/30 subcutaneous suspension: 40 unit(s) subcutaneous once a day (at bedtime)  Janumet 50 mg-1000 mg oral tablet: 1 tab(s) orally 2 times a day  omeprazole 20 mg oral delayed release capsule: 1 cap(s) orally once a day    Vitals:  T(C): 36.3, Max: 37.1 (12-13-19 @ 21:26)  T(F): 97.4, Max: 98.7 (12-13-19 @ 21:26)  HR: 78 (78 - 89)  BP: 110/54 (110/54 - 128/61)  RR: 18 (18 - 18)  SpO2: 98% (98% - 98%)    Physical Exam:  General: Awake, Alert. Not in acute distress.  Heart: Regular rate and rhythm; S1, S2; No murmurs.  Lungs: Clear to auscultation bilaterally.  Abdomen: Soft, nontender, nondistended.  Extremities: Right foot wrapped  Neuro: AAOx3, No focal deficits.    Labs:  CBC (12-14 @ 06:20)                        Hgb: 8.8    WBC: 7.87  )-----------------( Plts: 385                              Hct: 28.1     Chem (12-14 @ 06:20)  Na: 143  |     Cl: 105     |  BUN: 15  -----------------------------------------< Gluc: 138    K: 4.7   |    HCO3: 26  |  Cr: 0.9    Ca 8.6 (12-14 @ 06:20)  Mg 1.9 (12-14 @ 06:20)    LFTs (12-14 @ 06:20)  TPro 5.8  /  Alb 2.7  TBili 0.2  /  DBili     AST 14  /  ALT 8   /  AlkPhos 210          Microbiology:    Radiology: Patient Information:  JOSÉ MANUEL PORTER / 69y / Male / MRN#:[id number]    Hospital Day: 11d    HPI:  69 year-old male with PMH of insulin-dependent DM c/b diabetic foot ulcers s/p amputations of half of left great toe and entire right great toe, PVD, HTN, HLD, GERD, and depression who presents with worsening of a previously diagnosed R third toe wound. Patient was recently admitted to [facility] from 11/14-11/21 for worsening of the same diabetic foot ulcer of right foot with OM with cultures growing MSSA; patient had PICC line placed prior to discharge and was receiving PO Flagyl 500 mg BID and IV Rocephin 2 g qD with scheduled ID (Dr. Gary Turpin) follow-up.    Today, patient presents with mild pain and numbness in right third toe. Per patient and wife at bedside, he had appointment with Vascular Surgery scheduled for today but decided to come to the ED due to worsening appearance of toe (was starting to appear darker) in addition to feeling short of breath, fatigue, and loss of appetite for past three days. + Pain in R foot, + loss of appetite, + fatigue, + shortness of breath x3 days. Denies fever/chills/HA/change in vision/rhinorrhea/sore throat/cough/chest pain/abdominal pain/constipation/diarrhea.    Interval History:  Patient seen and examined at bedside. No acute events overnight. Only complaint this morning is constipation, ordered bowel regimen.    Past Medical History:  GERD (gastroesophageal reflux disease)  Depression  Diabetic foot ulcer  Dyslipidemia  HTN (hypertension)  DM (diabetes mellitus)    Past Surgical History:  Toe amputation status, right    Allergies:  No Known Allergies    Medications:  PRN:  acetaminophen   Tablet .. 650 milliGRAM(s) Oral every 6 hours PRN Mild Pain (1 - 3)    Standing:  aspirin enteric coated 81 milliGRAM(s) Oral daily  atorvastatin 40 milliGRAM(s) Oral at bedtime  enoxaparin Injectable 40 milliGRAM(s) SubCutaneous at bedtime  insulin glargine Injectable (LANTUS) 45 Unit(s) SubCutaneous at bedtime  insulin lispro (HumaLOG) corrective regimen sliding scale   SubCutaneous three times a day before meals  insulin lispro Injectable (HumaLOG) 15 Unit(s) SubCutaneous three times a day before meals  lactulose Syrup 20 Gram(s) Oral daily  levoFLOXacin IVPB 750 milliGRAM(s) IV Intermittent every 24 hours  lisinopril 40 milliGRAM(s) Oral daily  meropenem  IVPB 1000 milliGRAM(s) IV Intermittent every 8 hours  NIFEdipine XL 60 milliGRAM(s) Oral daily  pantoprazole    Tablet 40 milliGRAM(s) Oral before breakfast  polyethylene glycol 3350 17 Gram(s) Oral daily  senna 2 Tablet(s) Oral at bedtime    Home:  benazepril 40 mg oral tablet: 1 tab(s) orally once a day  Crestor 10 mg oral tablet: 1 tab(s) orally once a day (at bedtime)  HumuLIN 70/30 KwikPen 70 units-30 units/mL subcutaneous suspension: 50 unit(s) subcutaneous once a day before breakfast  HumuLIN 70/30 subcutaneous suspension: 40 unit(s) subcutaneous once a day (at bedtime)  Janumet 50 mg-1000 mg oral tablet: 1 tab(s) orally 2 times a day  omeprazole 20 mg oral delayed release capsule: 1 cap(s) orally once a day    Vitals:  T(C): 36.3, Max: 37.1 (12-13-19 @ 21:26)  T(F): 97.4, Max: 98.7 (12-13-19 @ 21:26)  HR: 78 (78 - 89)  BP: 110/54 (110/54 - 128/61)  RR: 18 (18 - 18)  SpO2: 98% (98% - 98%)    Physical Exam:  General: Awake, Alert. Not in acute distress.  Heart: Regular rate and rhythm; S1, S2; No murmurs.  Lungs: Clear to auscultation bilaterally.  Abdomen: Soft, nontender, nondistended.  Extremities: Right foot wrapped  Neuro: AAOx3, No focal deficits.    Labs:  CBC (12-14 @ 06:20)                        Hgb: 8.8    WBC: 7.87  )-----------------( Plts: 385                              Hct: 28.1     Chem (12-14 @ 06:20)  Na: 143  |     Cl: 105     |  BUN: 15  -----------------------------------------< Gluc: 138    K: 4.7   |    HCO3: 26  |  Cr: 0.9    Ca 8.6 (12-14 @ 06:20)  Mg 1.9 (12-14 @ 06:20)    LFTs (12-14 @ 06:20)  TPro 5.8  /  Alb 2.7  TBili 0.2  /  DBili     AST 14  /  ALT 8   /  AlkPhos 210

## 2019-12-14 NOTE — PROGRESS NOTE ADULT - ASSESSMENT
69 year-old male with PMH of insulin-dependent DM c/b diabetic foot ulcers s/p amputations of half of left great toe and entire right great toe, PVD, HTN, HLD, GERD, and depression who p/w worsening of a previously diagnosed R third toe wound found to have worsening gangrene requiring amputation of R third toe.    #Right third toe gangrene/ OM/ septic arthritis in setting of PVD in RLE  #2nd MTP septic arthritis   - On admission: Afebrile (Tmax 100.0F), WBC 13.2  - ESR 70, CRP 6.93  - Blood cultures negative, OR culture growing enterobacter (R cefepime, zosyn, S fluoro/bactrim)  - XR Right foot: Gangrene right 3rd toe and recurrent osteomyelitis, worsening right 3rd MTP septic arthritis and osteomyelitis, right 2nd MTP worsened septic arthritis  - S/p Amputation of right third toe on 12/4  - Vascular follow up as outpatient with Dr. Espinosa  - Baseline Hgb ~10, Monitor CBC, Transfuse if <7, Keep active Type and Screen  - Patient has PICC line from previous admission, can be removed prior to discharge  - S/p podiatry procedure 12/11  - Per ID, continue Levaquin 750mg IV Daily and Meropenem 1g IV q8H, can be discharged on PO Levaquin/Flagyl  - Per Podiatry, for OR washout tentatively Tuesday 12/17    #UTI  - Increased frequency, positive dysuria  - UA negative    #Insulin-dependent T2DM  - Monitor fingersticks with meals and at bedtime  - Continue Lantus 45 qHS, Lispro 15 TID AC, Sliding scale x1    #Dyspnea with fatigue, loss of appetite x3 days - resolved  - Characterizes dyspnea on exertion. Denies orthopnea or PND. No prior history of CHF  - TTE: EF 55-60%, Grade 1 diastolic dysfunction, borderline pulmonary hypertension  - CXR negative     #TYLER -resolved  - Baseline Cr 0.9-1.1     #Hypertension -well controlled  - Takes Benazepril 40mg Daily at home  - Continue Lisinopril 40mg Daily  - Monitor BP    #Hyperlipidemia  - Continue Atorvastatin 40mg qHS    #GERD  - Continue Protonix    #Depression  - Was taking Sertraline 50mg Daily. Per patient and pharmacy, has not been taking medication since August  - Follow-up outpatient to resume meds  - Monitor for any SI/HI while inpatient    #Pending: Podiatry OR for washout Tuesday  #Disposition: From home, discharge to [facility]  #Diet: DASH/TLC  #GI Prophylaxis: Protonix 40mg PO Daily  #DVT Prophylaxis: Lovenox 40mg SubQ qHS  #Activity: Ambulate as tolerated, NWB RLE  #Code Status: Full Code

## 2019-12-14 NOTE — CHART NOTE - NSCHARTNOTEFT_GEN_A_CORE
Registered Dietitian Follow-Up     ****Scroll for RD Recommendations at bottom of note****    Patient Profile Reviewed                           Yes [x]   No []     Nutrition History Previously Obtained        Yes []  No []       Pertinent Subjective Information: Pt reports [id number]% of meals consistently.     Pertinent Medical Interventions: Pt is s/p podiatry procedure 12/11. Plan for OR washout tentatively Tuesday 12/17. TYLER resolved.     Diet order: DASH/TLC, CHO consistent     Anthropometrics:  - Ht. 181.61cm  - Wt. 12/11 98kg no new wts  - %wt change  - BMI 29.7  - IBW~78.2kg+/-10%     Pertinent Lab Data: (12/14) H/H 8.8/28.1, s gluc 138, alk phos 210.  recent  108 213 172 260 82 52     Pertinent Meds: lovenox, abx, lantus, humalog, lactulose, miralax, senna, lipitor, zestril, procardia     Physical Findings:  - Appearance: WDL, A&O, no edema  - GI function: last BM 12/11, reports somewhat constipation, on bowel regimen, RN aware  - Tubes:  - Oral/Mouth cavity: WDL  - Skin: no pressure injuries     Nutrition Requirements  Weight Used: Wt: 98 kg. IBW: ~78.2 kg.     Energy: 0516-2940 kcal/day (MSJx1.1-1.2 AF) d/t BMI borderline 30    Protein: 78-94 g/day (1-1.2 g/kg IBW) - as above    Fluids: 1 mL/kcal     Nutrient Intake: likely meeting needs, 100% typically        [] Previous Nutrition Diagnosis: altered nutrition-related values            [x] Ongoing    -- however, pt on appropriate diet      [] Resolved    [] No active nutrition diagnosis identified at this time    Nutrition Intervention meals and snacks     Goal/Expected Outcome: Pt to maintain PO intake >75% of meal trays within 7 days     Indicator/Monitoring: RD to monitor diet order, energy intake, NFPF, body comp, glucose and renal profile.     Pt not at risk f/u 7 days    RECS: Continue DASH/TLC, CHO consistent diet modifiers. Continue insulin regimen. No further diet interventions.

## 2019-12-14 NOTE — PROGRESS NOTE ADULT - SUBJECTIVE AND OBJECTIVE BOX
PODIATRY PROGRESS NOTE   [id number] JOSÉ MANUEL PORTER is a pleasant well-nourished, well developed 69y Male in no acute distress, alert awake, and oriented to person, place and time.   Patient is a 69y old  Male who presents with a chief complaint of Worsening Right Diabetic Foot Ulcer (13 Dec 2019 07:35)    HPI:  69 year-old male with PMH of insulin-dependent DM c/b diabetic foot ulcers s/p amputations of half of left great toe and entire right great toe, PVD, HTN, HLD, GERD, and depression who presents with worsening of a previously diagnosed R third toe wound. Patient was recently admitted to [facility] from 11/14-11/21 for worsening of the same diabetic foot ulcer of right foot with OM with cultures growing MSSA; patient had PICC line placed prior to discharge and was receiving PO Flagyl 500 mg BID and IV Rocephin 2 g qD with scheduled ID (Dr. Gary Turpin) follow-up.    Today, patient presents with mild pain and numbness in right third toe. Per patient and wife at bedside, he had appointment with Vascular Surgery scheduled for today but decided to come to the ED due to worsening appearance of toe (was starting to appear darker) in addition to feeling short of breath, fatigue, and loss of appetite for past three days. + Pain in R foot, + loss of appetite, + fatigue, + shortness of breath x3 days. Denies fever/chills/HA/change in vision/rhinorrhea/sore throat/cough/chest pain/abdominal pain/constipation/diarrhea. (03 Dec 2019 13:34)      pt seen and assessed am rounds at bedside.  dressing clean dry intact.     Vital Signs Last 24 Hrs  T(C): 36.3 (14 Dec 2019 05:52), Max: 37.1 (13 Dec 2019 21:26)  T(F): 97.4 (14 Dec 2019 05:52), Max: 98.7 (13 Dec 2019 21:26)  HR: 78 (14 Dec 2019 05:52) (78 - 89)  BP: 110/54 (14 Dec 2019 05:52) (110/54 - 128/61)  BP(mean): --  RR: 18 (14 Dec 2019 05:52) (18 - 18)  SpO2: --                        8.8    7.87  )-----------( 385      ( 14 Dec 2019 06:20 )             28.1                 12-14    143  |  105  |  15  ----------------------------<  138<H>  4.7   |  26  |  0.9    Ca    8.6      14 Dec 2019 06:20  Mg     1.9     12-14    TPro  5.8<L>  /  Alb  2.7<L>  /  TBili  0.2  /  DBili  x   /  AST  14  /  ALT  8   /  AlkPhos  210<H>  12-14    Surgical Pathology Report (12.11.19 @ 16:40)    Surgical Pathology Report:   ACCESSION No:  [id number]    JOSÉ MANUEL PORTER 2        Surgical Final Report          Final Diagnosis  1. Second digit, right foot, amputation:  - Gangrenous ulcer of skin and subcutis.  - The underlying bone with necrosis.  - The bone at amputation margin showing osteomyelitis.    2. Second metatarsal of right foot:  - Bone showing marked acute and chronic osteomyelitis.    3. Resection bone, 4th and 5th phalangeal and metatarsal, right  foot:  - Fragments of articular bone with sclerosis, appearing free of  osteomyelitis.  - Adjacent skin and subcutis appears viable.    Verified by: Raghavendra Hunt M.D.  (Electronic Signature)  Reported on: 12/13/19 17:40 [facility], [address] [place] [address]  Phone: [phone]   Fax: [phone]  _________________________________________________________________    Clinical History  2nd, 4th, 5th ray amputation right foot with closure    Specimen(s) Submitted  1.    Second digit right foot  2.    Second metatarsal right foot  3.    Resected bone 4th and 5th phalangeal and metatarsal right  foot    Gross Description  1.  The specimen is received in formalin, labeled "second digit  right foot" and consists of a gangrenous black toe measuring 4 x  2 x 2 cm as well as additional piece of cylindrical tan brown  bone measuring 3.5 cm long x 1 cm in diameter.  Representative  sections are submitted.    Summary of Sections:  1A-1B- gangrenous two sections- 1  1C- sections from separate cylindrical piece of wound- 1    Total:  3 blocks    2.  The specimen is received in formalin, labeled "second  metatarsal right foot" and consists of two pieces of bone with  tightly attached soft tissue patch measuring 2 x 1 x 1 cm and 2 x  1 x 1 cm respectively.  One of the pieces of            JOSÉ MANUEL PORTER                       2        Surgical Final Report          bone has an articular surface.  Representative sections are  submitted after decalcification.  (2)    3.  The specimen is received in formalin, labeled "resected bone  4th and 5th phalangeal and metatarsal right foot" and consists of  fragments of bone and tendon measuring 5.5 x 5 x 1 cm in  aggregate.  Two of the pieces of bone have articular surface.  Also received one toe measuring 5 x 2 x 1.5 cm.  Representative  sections are submitted.  (3 blocks)    Specimen was received and underwent gross examination at [facility], [address] [place] [address].    12/12/19 09:48 ia    Perioperative Diagnosis  Osteomyelitis        PHYSICAL EXAM  Right LE Focused:  DERM: Sutures well coapted and intact. No signs of wound dehiscence. No signs of drainage. mild macerated skin edges.  superficial ulcer dorsal left foot, stable    Assessment:  S/p TMA, right foot. (DOS: 12/11/2019)  s/p TLS drain removed 12/13    Plan:  Pt. seen and evaluated.  Discussed treatment and condition w/ patient  Applied betadine soaked adaptic dsd kerlix  NWB RLE   Req. VNS for dressing changes  sx pathology as above  cont iv abx as per ID  sx tentatively booked next Tuesday for possible dbx and washout, Will cont. to monitor surgical site.  f/u with attending  12-14-19 @ 09:40

## 2019-12-15 LAB
GLUCOSE BLDC GLUCOMTR-MCNC: 111 MG/DL — HIGH (ref 70–99)
GLUCOSE BLDC GLUCOMTR-MCNC: 128 MG/DL — HIGH (ref 70–99)
GLUCOSE BLDC GLUCOMTR-MCNC: 136 MG/DL — HIGH (ref 70–99)
GLUCOSE BLDC GLUCOMTR-MCNC: 248 MG/DL — HIGH (ref 70–99)
GLUCOSE BLDC GLUCOMTR-MCNC: 57 MG/DL — LOW (ref 70–99)
GLUCOSE BLDC GLUCOMTR-MCNC: 57 MG/DL — LOW (ref 70–99)
GLUCOSE BLDC GLUCOMTR-MCNC: 83 MG/DL — SIGNIFICANT CHANGE UP (ref 70–99)

## 2019-12-15 PROCEDURE — 99233 SBSQ HOSP IP/OBS HIGH 50: CPT

## 2019-12-15 RX ORDER — INSULIN GLARGINE 100 [IU]/ML
33 INJECTION, SOLUTION SUBCUTANEOUS ONCE
Refills: 0 | Status: COMPLETED | OUTPATIENT
Start: 2019-12-15 | End: 2019-12-15

## 2019-12-15 RX ADMIN — SENNA PLUS 2 TABLET(S): 8.6 TABLET ORAL at 21:48

## 2019-12-15 RX ADMIN — ENOXAPARIN SODIUM 40 MILLIGRAM(S): 100 INJECTION SUBCUTANEOUS at 21:48

## 2019-12-15 RX ADMIN — Medication 81 MILLIGRAM(S): at 11:43

## 2019-12-15 RX ADMIN — LISINOPRIL 40 MILLIGRAM(S): 2.5 TABLET ORAL at 06:00

## 2019-12-15 RX ADMIN — ATORVASTATIN CALCIUM 40 MILLIGRAM(S): 80 TABLET, FILM COATED ORAL at 21:48

## 2019-12-15 RX ADMIN — LACTULOSE 20 GRAM(S): 10 SOLUTION ORAL at 11:43

## 2019-12-15 RX ADMIN — Medication 15 UNIT(S): at 11:42

## 2019-12-15 RX ADMIN — PANTOPRAZOLE SODIUM 40 MILLIGRAM(S): 20 TABLET, DELAYED RELEASE ORAL at 06:00

## 2019-12-15 RX ADMIN — MEROPENEM 100 MILLIGRAM(S): 1 INJECTION INTRAVENOUS at 21:52

## 2019-12-15 RX ADMIN — Medication 2: at 11:43

## 2019-12-15 RX ADMIN — INSULIN GLARGINE 33 UNIT(S): 100 INJECTION, SOLUTION SUBCUTANEOUS at 22:00

## 2019-12-15 RX ADMIN — POLYETHYLENE GLYCOL 3350 17 GRAM(S): 17 POWDER, FOR SOLUTION ORAL at 11:43

## 2019-12-15 RX ADMIN — Medication 60 MILLIGRAM(S): at 06:00

## 2019-12-15 RX ADMIN — Medication 15 UNIT(S): at 17:12

## 2019-12-15 RX ADMIN — MEROPENEM 100 MILLIGRAM(S): 1 INJECTION INTRAVENOUS at 13:46

## 2019-12-15 RX ADMIN — MEROPENEM 100 MILLIGRAM(S): 1 INJECTION INTRAVENOUS at 06:00

## 2019-12-15 NOTE — PROGRESS NOTE ADULT - SUBJECTIVE AND OBJECTIVE BOX
JOSÉ MANUEL PORTER  [facility] T2-3A 017 B ([facility] T2-3A)            Patient was evaluated and examined  by bedside, reports feeling mild pain in right foot.                REVIEW OF SYSTEMS:  please see pertinent positives mentioned above, all other 12 ROS negative      T(C): , Max: 36.6 (12-14-19 @ 21:00)  HR: 88 (12-15-19 @ 04:56)  BP: 147/70 (12-15-19 @ 04:56)  RR: 18 (12-15-19 @ 04:56)  SpO2: --  CAPILLARY BLOOD GLUCOSE      POCT Blood Glucose.: 83 mg/dL (15 Dec 2019 08:17)  POCT Blood Glucose.: 57 mg/dL (15 Dec 2019 08:02)  POCT Blood Glucose.: 57 mg/dL (15 Dec 2019 07:46)  POCT Blood Glucose.: 128 mg/dL (15 Dec 2019 02:35)  POCT Blood Glucose.: 208 mg/dL (14 Dec 2019 23:48)  POCT Blood Glucose.: 274 mg/dL (14 Dec 2019 21:49)  POCT Blood Glucose.: 247 mg/dL (14 Dec 2019 16:48)  POCT Blood Glucose.: 224 mg/dL (14 Dec 2019 11:35)      PHYSICAL EXAM:  General: NAD, AAOX3, patient is laying comfortably in bed  HEENT: AT, NC, Supple, NO JVD, NO CB  Lungs: CTA B/L, no wheezing, no rhonchi  CVS: normal S1, S2, RRR, NO M/G/R  Abdomen: soft, bowel sounds present, non-tender, non-distended  Extremities: no edema, no clubbing, no cyanosis, positive peripheral pulses b/l  Neuro: no acute focal neurological deficits  Skin: right foot post op changes with wound care dressing      LAB  CBC  Date: 12-14-19 @ 06:20  Mean cell Lqkdxxajby60.8  Mean cell Hemoglobin Conc31.3  Mean cell Volum 88.6  Platelet count-Automate 385  RBC Count 3.17  Red Cell Distrib Width13.7  WBC Count7.87  % Albumin, Urine--  Hematocrit 28.1  Hemoglobin 8.8  CBC  Date: 12-13-19 @ 04:30  Mean cell Eddeseuypi11.2  Mean cell Hemoglobin Conc31.8  Mean cell Volum 88.7  Platelet count-Automate 389  RBC Count 3.09  Red Cell Distrib Width13.9  WBC Count9.65  % Albumin, Urine--  Hematocrit 27.4  Hemoglobin 8.7  CBC  Date: 12-12-19 @ 06:09  Mean cell Nxjtoaotsq63.7  Mean cell Hemoglobin Conc31.3  Mean cell Volum 88.5  Platelet count-Automate 416  RBC Count 3.21  Red Cell Distrib Width13.8  WBC Count10.08  % Albumin, Urine--  Hematocrit 28.4  Hemoglobin 8.9  CBC  Date: 12-11-19 @ 06:27  Mean cell Ywrqwhzkxt00.7  Mean cell Hemoglobin Conc30.8  Mean cell Volum 89.9  Platelet count-Automate 448  RBC Count 3.47  Red Cell Distrib Width13.8  WBC Count7.63  % Albumin, Urine--  Hematocrit 31.2  Hemoglobin 9.6  CBC  Date: 12-10-19 @ 05:26  Mean cell Waugtoaraw55.1  Mean cell Hemoglobin Conc31.8  Mean cell Volum 88.4  Platelet count-Automate 378  RBC Count 3.27  Red Cell Distrib Width13.6  WBC Count6.78  % Albumin, Urine--  Hematocrit 28.9  Hemoglobin 9.2  CBC  Date: 12-09-19 @ 05:41  Mean cell Vjpspkmeqm66.3  Mean cell Hemoglobin Conc31.1  Mean cell Volum 87.9  Platelet count-Automate 349  RBC Count 3.22  Red Cell Distrib Width13.5  WBC Count7.26  % Albumin, Urine--  Hematocrit 28.3  Hemoglobin 8.8    [facility]  12-14-19 @ 06:20  Blood Gas Arterial-Calcium,Ionized--  Blood Urea Nitrogen, Serum 15 mg/dL [10 - 20]  Carbon Dioxide, Serum26 mmol/L [17 - 32]  Chloride, Rwlyh328 mmol/L [98 - 110]  Creatinie, Serum0.9 mg/dL [0.7 - 1.5]  Glucose, Jdjtl743 mg/dL<H> [70 - 99]  Potassium, Serum4.7 mmol/L [3.5 - 5.0]  Sodium, Serum 143 mmol/L [135 - 146]  [facility]  12-13-19 @ 04:30  Blood Gas Arterial-Calcium,Ionized--  Blood Urea Nitrogen, Serum 11 mg/dL [10 - 20]  Carbon Dioxide, Serum26 mmol/L [17 - 32]  Chloride, Tjjpz239 mmol/L [98 - 110]  Creatinie, Serum0.9 mg/dL [0.7 - 1.5]  Glucose, Serum57 mg/dL<L> [70 - 99]  Potassium, Serum4.5 mmol/L [3.5 - 5.0]  Sodium, Serum 144 mmol/L [135 - 146]  BMP  12-12-19 @ 06:09  Blood Gas Arterial-Calcium,Ionized--  Blood Urea Nitrogen, Serum 11 mg/dL [10 - 20]  Carbon Dioxide, Serum25 mmol/L [17 - 32]  Chloride, Wgjoz470 mmol/L [98 - 110]  Creatinie, Serum0.8 mg/dL [0.7 - 1.5]  Glucose, Fayhk100 mg/dL<H> [70 - 99]  Potassium, Serum4.7 mmol/L [3.5 - 5.0]  Sodium, Serum 137 mmol/L [135 - 146]  BMP  12-11-19 @ 06:27  Blood Gas Arterial-Calcium,Ionized--  Blood Urea Nitrogen, Serum 15 mg/dL [10 - 20]  Carbon Dioxide, Serum24 mmol/L [17 - 32]  Chloride, Tbisd538 mmol/L [98 - 110]  Creatinie, Serum0.9 mg/dL [0.7 - 1.5]  Glucose, Mxrcf323 mg/dL<H> [70 - 99]  Potassium, Serum4.8 mmol/L [3.5 - 5.0]  Sodium, Serum 142 mmol/L [135 - 146]              Microbiology:    Culture - Blood (collected 12-04-19 @ 22:04)  Source: .Blood None  Final Report (12-10-19 @ 02:11):    No growth at 5 days.    Culture - Tissue with Gram Stain (collected 12-04-19 @ 21:00)  Source: .Tissue None  Gram Stain (12-05-19 @ 12:57):    Numerous Gram Negative Rods seen per oil power field    No polymorphonuclear leukocytes seen per low power field  Final Report (12-12-19 @ 12:42):    Numerous Enterobacter cloacae complex    Few Corynebacterium species "Susceptibilities not performed"    Rare Stenotrophomonas maltophilia  Organism: Enterobacter cloacae complex  Stenotrophomonas maltophilia (12-12-19 @ 12:42)  Organism: Stenotrophomonas maltophilia (12-12-19 @ 12:42)      -  Ceftazidime: S 8      -  Levofloxacin: S <=2      -  Trimethoprim/Sulfamethoxazole: S <=2/38      Method Type: LINDSEY  Organism: Enterobacter cloacae complex (12-12-19 @ 12:42)      -  Amikacin: S <=16      -  Amoxicillin/Clavulanic Acid: R >16/8      -  Ampicillin: R >16 These ampicillin results predict results for amoxicillin      -  Ampicillin/Sulbactam: R >16/8 Enterobacter, Citrobacter, and Serratia may develop resistance during prolonged therapy (3-4 days)      -  Aztreonam: R >16      -  Cefazolin: R >16 Enterobacter, Citrobacter, and Serratia may develop resistance during prolonged therapy (3-4 days)      -  Cefepime: R >16      -  Cefoxitin: R >16      -  Ceftriaxone: R >32 Enterobacter, Citrobacter, and Serratia may develop resistance during prolonged therapy      -  Ciprofloxacin: S <=1      -  Ertapenem: I 1      -  Gentamicin: S <=2      -  Imipenem: S <=1      -  Levofloxacin: S <=2      -  Meropenem: S <=1      -  Piperacillin/Tazobactam: R >64      -  Tobramycin: S <=2      -  Trimethoprim/Sulfamethoxazole: S <=2/38      Method Type: LINDSEY    Culture - Fungal, Other (collected 12-04-19 @ 21:00)  Source: .Other None  Preliminary Report (12-14-19 @ 15:02):    No growth    Culture - Surgical Swab (collected 12-04-19 @ 21:00)  Source: .Surgical Swab None  Final Report (12-11-19 @ 12:51):    Numerous Enterobacter cloacae complex    Numerous Stenotrophomonas maltophilia    Few Corynebacterium species "Susceptibilities not performed"    Few Anaerobic Gram Negative Berhane Unable to Indentify further    "Susceptibilities not performed"  Organism: Enterobacter cloacae complex  Stenotrophomonas maltophilia (12-11-19 @ 12:51)  Organism: Stenotrophomonas maltophilia (12-11-19 @ 12:51)      -  Ceftazidime: I 16      -  Levofloxacin: S <=2      -  Trimethoprim/Sulfamethoxazole: S <=2/38      Method Type: LINDSEY  Organism: Enterobacter cloacae complex (12-11-19 @ 12:51)      -  Amikacin: S <=16      -  Amoxicillin/Clavulanic Acid: R >16/8      -  Ampicillin: R >16 These ampicillin results predict results for amoxicillin      -  Ampicillin/Sulbactam: R >16/8 Enterobacter, Citrobacter, and Serratia may develop resistance during prolonged therapy (3-4 days)      -  Aztreonam: R >16      -  Cefazolin: R >16 Enterobacter, Citrobacter, and Serratia may develop resistance during prolonged therapy (3-4 days)      -  Cefepime: R >16      -  Cefoxitin: R >16      -  Ceftriaxone: R >32 Enterobacter, Citrobacter, and Serratia may develop resistance during prolonged therapy      -  Ciprofloxacin: S <=1      -  Ertapenem: I 1      -  Gentamicin: S <=2      -  Imipenem: S <=1      -  Levofloxacin: S <=2      -  Meropenem: S <=1      -  Piperacillin/Tazobactam: R >64      -  Tobramycin: S <=2      -  Trimethoprim/Sulfamethoxazole: S <=2/38      Method Type: LINDSEY    Culture - Abscess with Gram Stain (collected 12-04-19 @ 14:20)  Source: .Abscess toe left  Final Report (12-07-19 @ 18:01):    Culture yields >4 types of aerobic and/or anaerobic bacteria    Call client services within 7 days if further workup is clinically    indicated.    Culture includes    Numerous Stenotrophomonas maltophilia    Numerous Pseudomonas aeruginosa  Organism: Stenotrophomonas maltophilia  Pseudomonas aeruginosa (12-07-19 @ 18:01)  Organism: Pseudomonas aeruginosa (12-07-19 @ 18:01)      -  Amikacin: S <=16      -  Aztreonam: S <=4      -  Cefepime: S <=2      -  Ceftazidime: S 4      -  Ciprofloxacin: S <=1      -  Gentamicin: S <=2      -  Imipenem: S 2      -  Levofloxacin: S <=2      -  Meropenem: S <=1      -  Piperacillin/Tazobactam: S <=8      -  Tobramycin: S <=2      Method Type: LINDSEY  Organism: Stenotrophomonas maltophilia (12-07-19 @ 18:01)      -  Ceftazidime: R >16      -  Levofloxacin: S <=2      -  Trimethoprim/Sulfamethoxazole: S <=2/38      Method Type: LINDSEY    Culture - Blood (collected 12-03-19 @ 16:27)  Source: .Blood None  Final Report (12-09-19 @ 02:14):    No growth at 5 days.    Culture - Blood (collected 11-19-19 @ 06:32)  Source: .Blood None  Final Report (11-24-19 @ 13:00):    No growth at 5 days.    Culture - Blood (collected 11-19-19 @ 06:32)  Source: .Blood None  Final Report (11-24-19 @ 13:00):    No growth at 5 days.        Medications:  acetaminophen   Tablet .. 650 milliGRAM(s) Oral every 6 hours PRN  aspirin enteric coated 81 milliGRAM(s) Oral daily  atorvastatin 40 milliGRAM(s) Oral at bedtime  enoxaparin Injectable 40 milliGRAM(s) SubCutaneous at bedtime  insulin glargine Injectable (LANTUS) 45 Unit(s) SubCutaneous at bedtime  insulin lispro (HumaLOG) corrective regimen sliding scale   SubCutaneous three times a day before meals  insulin lispro Injectable (HumaLOG) 15 Unit(s) SubCutaneous three times a day before meals  lactulose Syrup 20 Gram(s) Oral daily  levoFLOXacin IVPB 750 milliGRAM(s) IV Intermittent every 24 hours  lisinopril 40 milliGRAM(s) Oral daily  meropenem  IVPB 1000 milliGRAM(s) IV Intermittent every 8 hours  NIFEdipine XL 60 milliGRAM(s) Oral daily  pantoprazole    Tablet 40 milliGRAM(s) Oral before breakfast  polyethylene glycol 3350 17 Gram(s) Oral daily  senna 2 Tablet(s) Oral at bedtime        Assessment and Plan:  #Right foot toe gangrene, OM, septic arthritis- patient is s/p surgical amputation of 2nd,4th, 5th toes, continue IV abx. tx.- Meropenem,levaquin   -pain management  -patient has a picc line.  -as per Podiatry surgery recommendations, there is planned another washout of right foot post op area on tuesday.  #Dysuria- obtain u/a, urine cxs.  # DM 2- continue insulin tx. with bsfs monitoring  #HTN - continue current meds.  # h/o GERD, Depression, Dyslipidemia-  stable, resumed on home regimen tx.   # daily dvt proph.     #Progress Note Handoff: patient planned for 2nd washout post op on tuesday. continue IV abx. tx.  Family discussion: medical plan of tx. was d/w pt. by bedside Disposition: STR possibly on wednesday.

## 2019-12-15 NOTE — PROGRESS NOTE ADULT - SUBJECTIVE AND OBJECTIVE BOX
Podiatry Progress Note    Subjective:   JOSÉ MANUEL PORTER is a pleasant well-nourished, well developed 69y Male in no acute distress, alert awake, and oriented to person, place and time.   Patient is a 69y old  Male who presents with a chief complaint of Worsening Right Diabetic Foot Ulcer (15 Dec 2019 11:01)    HPI:  69 year-old male with PMH of insulin-dependent DM c/b diabetic foot ulcers s/p amputations of half of left great toe and entire right great toe, PVD, HTN, HLD, GERD, and depression who presents with worsening of a previously diagnosed R third toe wound. Patient was recently admitted to [facility] from 11/14-11/21 for worsening of the same diabetic foot ulcer of right foot with OM with cultures growing MSSA; patient had PICC line placed prior to discharge and was receiving PO Flagyl 500 mg BID and IV Rocephin 2 g qD with scheduled ID (Dr. Gary Turpin) follow-up.    Today, patient presents with mild pain and numbness in right third toe. Per patient and wife at bedside, he had appointment with Vascular Surgery scheduled for today but decided to come to the ED due to worsening appearance of toe (was starting to appear darker) in addition to feeling short of breath, fatigue, and loss of appetite for past three days. + Pain in R foot, + loss of appetite, + fatigue, + shortness of breath x3 days. Denies fever/chills/HA/change in vision/rhinorrhea/sore throat/cough/chest pain/abdominal pain/constipation/diarrhea. (03 Dec 2019 13:34)      PAST MEDICAL & SURGICAL HISTORY:  GERD (gastroesophageal reflux disease)  Depression  Diabetic foot ulcer  Dyslipidemia  HTN (hypertension)  DM (diabetes mellitus): 12/27/18 hgb aic  11.2  Toe amputation status, right: (2008)     Objective:  Vital Signs Last 24 Hrs  T(C): 36.6 (15 Dec 2019 14:31), Max: 36.6 (14 Dec 2019 21:00)  T(F): 97.8 (15 Dec 2019 14:31), Max: 97.9 (14 Dec 2019 21:00)  HR: 94 (15 Dec 2019 14:31) (88 - 95)  BP: 134/63 (15 Dec 2019 14:31) (113/67 - 147/70)  BP(mean): --  RR: 18 (15 Dec 2019 14:31) (18 - 18)  SpO2: --                        8.8    7.87  )-----------( 385      ( 14 Dec 2019 06:20 )             28.1                 12-14    143  |  105  |  15  ----------------------------<  138<H>  4.7   |  26  |  0.9    Ca    8.6      14 Dec 2019 06:20  Mg     1.9     12-14    TPro  5.8<L>  /  Alb  2.7<L>  /  TBili  0.2  /  DBili  x   /  AST  14  /  ALT  8   /  AlkPhos  210<H>  12-14    PHYSICAL EXAM  Right LE Focused:  DERM:   Sutures Intact and wound edges are well coapted w/o signs of Infection Along Suture Site and wound Dehiscences   No signs of drainage w/ Mildly Macerated Wound Edges   Superficial Ulcer Dorsal Left Foot - Stable     Assessment:  S/p TMA, right foot. (DOS: 12/11/2019)  s/p TLS drain removed 12/13  Sutures Site Stable; No Clinical Signs of Infection     Plan:  Patient seen and evaluated. All questions and concerns were addressed and answered   Discussed treatment and condition w/ patient  Surgical Site Flushed w/ Peroxide and Irrigated w/ Normal Saline; Applied 1/2 Packing / DSD / Kerlix / Light Ace Wrap  Weight Bearing Status; Continue Non-Weight Bearing Right Foot   Request VNS for Continued Dressing Changes; Post Discharge  Surgery Currently Scheduled for Tuesday 12/17 for Possible Debridement and Washout  Podiatry Will Continue to Monitor Surgical Site  Attending Updated and Aware

## 2019-12-16 LAB
ALBUMIN SERPL ELPH-MCNC: 3.2 G/DL — LOW (ref 3.5–5.2)
ALP SERPL-CCNC: 259 U/L — HIGH (ref 30–115)
ALT FLD-CCNC: 15 U/L — SIGNIFICANT CHANGE UP (ref 0–41)
ANION GAP SERPL CALC-SCNC: 14 MMOL/L — SIGNIFICANT CHANGE UP (ref 7–14)
AST SERPL-CCNC: 19 U/L — SIGNIFICANT CHANGE UP (ref 0–41)
BASOPHILS # BLD AUTO: 0.03 K/UL — SIGNIFICANT CHANGE UP (ref 0–0.2)
BASOPHILS NFR BLD AUTO: 0.3 % — SIGNIFICANT CHANGE UP (ref 0–1)
BILIRUB SERPL-MCNC: 0.3 MG/DL — SIGNIFICANT CHANGE UP (ref 0.2–1.2)
BUN SERPL-MCNC: 14 MG/DL — SIGNIFICANT CHANGE UP (ref 10–20)
CALCIUM SERPL-MCNC: 9.4 MG/DL — SIGNIFICANT CHANGE UP (ref 8.5–10.1)
CHLORIDE SERPL-SCNC: 104 MMOL/L — SIGNIFICANT CHANGE UP (ref 98–110)
CO2 SERPL-SCNC: 25 MMOL/L — SIGNIFICANT CHANGE UP (ref 17–32)
CREAT SERPL-MCNC: 0.9 MG/DL — SIGNIFICANT CHANGE UP (ref 0.7–1.5)
EOSINOPHIL # BLD AUTO: 0.06 K/UL — SIGNIFICANT CHANGE UP (ref 0–0.7)
EOSINOPHIL NFR BLD AUTO: 0.7 % — SIGNIFICANT CHANGE UP (ref 0–8)
GLUCOSE BLDC GLUCOMTR-MCNC: 127 MG/DL — HIGH (ref 70–99)
GLUCOSE BLDC GLUCOMTR-MCNC: 189 MG/DL — HIGH (ref 70–99)
GLUCOSE BLDC GLUCOMTR-MCNC: 201 MG/DL — HIGH (ref 70–99)
GLUCOSE BLDC GLUCOMTR-MCNC: 79 MG/DL — SIGNIFICANT CHANGE UP (ref 70–99)
GLUCOSE SERPL-MCNC: 79 MG/DL — SIGNIFICANT CHANGE UP (ref 70–99)
HCT VFR BLD CALC: 30.6 % — LOW (ref 42–52)
HGB BLD-MCNC: 9.5 G/DL — LOW (ref 14–18)
IMM GRANULOCYTES NFR BLD AUTO: 1.2 % — HIGH (ref 0.1–0.3)
LYMPHOCYTES # BLD AUTO: 0.85 K/UL — LOW (ref 1.2–3.4)
LYMPHOCYTES # BLD AUTO: 9.3 % — LOW (ref 20.5–51.1)
MAGNESIUM SERPL-MCNC: 1.8 MG/DL — SIGNIFICANT CHANGE UP (ref 1.8–2.4)
MCHC RBC-ENTMCNC: 27.7 PG — SIGNIFICANT CHANGE UP (ref 27–31)
MCHC RBC-ENTMCNC: 31 G/DL — LOW (ref 32–37)
MCV RBC AUTO: 89.2 FL — SIGNIFICANT CHANGE UP (ref 80–94)
MONOCYTES # BLD AUTO: 0.76 K/UL — HIGH (ref 0.1–0.6)
MONOCYTES NFR BLD AUTO: 8.3 % — SIGNIFICANT CHANGE UP (ref 1.7–9.3)
NEUTROPHILS # BLD AUTO: 7.35 K/UL — HIGH (ref 1.4–6.5)
NEUTROPHILS NFR BLD AUTO: 80.2 % — HIGH (ref 42.2–75.2)
NRBC # BLD: 0 /100 WBCS — SIGNIFICANT CHANGE UP (ref 0–0)
PLATELET # BLD AUTO: 447 K/UL — HIGH (ref 130–400)
POTASSIUM SERPL-MCNC: 4.9 MMOL/L — SIGNIFICANT CHANGE UP (ref 3.5–5)
POTASSIUM SERPL-SCNC: 4.9 MMOL/L — SIGNIFICANT CHANGE UP (ref 3.5–5)
PROT SERPL-MCNC: 6.7 G/DL — SIGNIFICANT CHANGE UP (ref 6–8)
RBC # BLD: 3.43 M/UL — LOW (ref 4.7–6.1)
RBC # FLD: 13.7 % — SIGNIFICANT CHANGE UP (ref 11.5–14.5)
SODIUM SERPL-SCNC: 143 MMOL/L — SIGNIFICANT CHANGE UP (ref 135–146)
WBC # BLD: 9.16 K/UL — SIGNIFICANT CHANGE UP (ref 4.8–10.8)
WBC # FLD AUTO: 9.16 K/UL — SIGNIFICANT CHANGE UP (ref 4.8–10.8)

## 2019-12-16 PROCEDURE — 99232 SBSQ HOSP IP/OBS MODERATE 35: CPT

## 2019-12-16 RX ORDER — INSULIN GLARGINE 100 [IU]/ML
30 INJECTION, SOLUTION SUBCUTANEOUS AT BEDTIME
Refills: 0 | Status: DISCONTINUED | OUTPATIENT
Start: 2019-12-16 | End: 2019-12-17

## 2019-12-16 RX ORDER — INSULIN GLARGINE 100 [IU]/ML
15 INJECTION, SOLUTION SUBCUTANEOUS ONCE
Refills: 0 | Status: COMPLETED | OUTPATIENT
Start: 2019-12-16 | End: 2019-12-16

## 2019-12-16 RX ADMIN — Medication 15 UNIT(S): at 12:16

## 2019-12-16 RX ADMIN — Medication 1: at 17:47

## 2019-12-16 RX ADMIN — POLYETHYLENE GLYCOL 3350 17 GRAM(S): 17 POWDER, FOR SOLUTION ORAL at 11:17

## 2019-12-16 RX ADMIN — LISINOPRIL 40 MILLIGRAM(S): 2.5 TABLET ORAL at 06:14

## 2019-12-16 RX ADMIN — Medication 15 UNIT(S): at 17:47

## 2019-12-16 RX ADMIN — PANTOPRAZOLE SODIUM 40 MILLIGRAM(S): 20 TABLET, DELAYED RELEASE ORAL at 06:14

## 2019-12-16 RX ADMIN — MEROPENEM 100 MILLIGRAM(S): 1 INJECTION INTRAVENOUS at 06:14

## 2019-12-16 RX ADMIN — MEROPENEM 100 MILLIGRAM(S): 1 INJECTION INTRAVENOUS at 22:52

## 2019-12-16 RX ADMIN — SENNA PLUS 2 TABLET(S): 8.6 TABLET ORAL at 22:49

## 2019-12-16 RX ADMIN — Medication 60 MILLIGRAM(S): at 06:14

## 2019-12-16 RX ADMIN — Medication 2: at 12:16

## 2019-12-16 RX ADMIN — LACTULOSE 20 GRAM(S): 10 SOLUTION ORAL at 11:16

## 2019-12-16 RX ADMIN — Medication 81 MILLIGRAM(S): at 11:21

## 2019-12-16 RX ADMIN — MEROPENEM 100 MILLIGRAM(S): 1 INJECTION INTRAVENOUS at 15:37

## 2019-12-16 RX ADMIN — INSULIN GLARGINE 15 UNIT(S): 100 INJECTION, SOLUTION SUBCUTANEOUS at 22:49

## 2019-12-16 RX ADMIN — ENOXAPARIN SODIUM 40 MILLIGRAM(S): 100 INJECTION SUBCUTANEOUS at 22:50

## 2019-12-16 RX ADMIN — ATORVASTATIN CALCIUM 40 MILLIGRAM(S): 80 TABLET, FILM COATED ORAL at 22:50

## 2019-12-16 NOTE — PROGRESS NOTE ADULT - ASSESSMENT
69 year-old male with PMH of insulin-dependent DM c/b diabetic foot ulcers s/p amputations of half of left great toe and entire right great toe, PVD, HTN, HLD, GERD, and depression who p/w worsening of a previously diagnosed R third toe wound found to have worsening gangrene requiring amputation of R third toe.    #Right third toe gangrene/ OM/ septic arthritis in setting of PVD in RLE  #2nd MTP septic arthritis   - On admission: Afebrile (Tmax 100.0F), WBC 13.2  - ESR 70, CRP 6.93  - Blood cultures negative, OR culture growing enterobacter (R cefepime, zosyn, S fluoro/bactrim)  - XR Right foot: Gangrene right 3rd toe and recurrent osteomyelitis, worsening right 3rd MTP septic arthritis and osteomyelitis, right 2nd MTP worsened septic arthritis  - S/p Amputation of right third toe on 12/4  - Vascular follow up as outpatient with Dr. Espinosa  - Baseline Hgb ~10, Monitor CBC, Transfuse if <7, Keep active Type and Screen  - Patient has PICC line from previous admission, can be removed prior to discharge  - S/p podiatry procedure 12/11  - Per ID, continue Levaquin 750mg IV Daily and Meropenem 1g IV q8H, can be discharged on PO Levaquin/Flagyl  - Per Podiatry, for OR washout tomorrow 12/17    #UTI  - Increased frequency, positive dysuria  - UA negative    #Insulin-dependent T2DM  - Monitor fingersticks with meals and at bedtime  - AM Glucose low, decrease Lantus to 30 qHS  - Continue Lispro 15 TID AC, Sliding scale x1    #Dyspnea with fatigue, loss of appetite x3 days - resolved  - Characterizes dyspnea on exertion. Denies orthopnea or PND. No prior history of CHF  - TTE: EF 55-60%, Grade 1 diastolic dysfunction, borderline pulmonary hypertension  - CXR negative     #TYLER -resolved  - Baseline Cr 0.9-1.1     #Hypertension -well controlled  - Takes Benazepril 40mg Daily at home  - Continue Lisinopril 40mg Daily  - Monitor BP    #Hyperlipidemia  - Continue Atorvastatin 40mg qHS    #GERD  - Continue Protonix    #Depression  - Was taking Sertraline 50mg Daily. Per patient and pharmacy, has not been taking medication since August  - Follow-up outpatient to resume meds  - Monitor for any SI/HI while inpatient    #Pending: Podiatry OR for washout tomorrow 12/17  #Disposition: From home, discharge to [facility]  #Diet: DASH/TLC  #GI Prophylaxis: Protonix 40mg PO Daily  #DVT Prophylaxis: Lovenox 40mg SubQ qHS  #Activity: Ambulate as tolerated, NWENEIDA FRAUSTO  #Code Status: Full Code

## 2019-12-16 NOTE — CHART NOTE - NSCHARTNOTEFT_GEN_A_CORE
-Spoke to medicine resident regarding coags and Type and screen. Initially was cancelled for 16:00 collection  -Will put in order for 4:30am collection  -Please optimize all labs prior to surgery tomorrow, 12/17 K < 5.0, H> 8.0 and BG < 250mg/dl

## 2019-12-16 NOTE — PROGRESS NOTE ADULT - SUBJECTIVE AND OBJECTIVE BOX
Patient Information:  JOSÉ MANUEL PORTER / 69y / Male / MRN#:[id number]    Hospital Day: 13d    HPI:  69 year-old male with PMH of insulin-dependent DM c/b diabetic foot ulcers s/p amputations of half of left great toe and entire right great toe, PVD, HTN, HLD, GERD, and depression who presents with worsening of a previously diagnosed R third toe wound. Patient was recently admitted to [facility] from 11/14-11/21 for worsening of the same diabetic foot ulcer of right foot with OM with cultures growing MSSA; patient had PICC line placed prior to discharge and was receiving PO Flagyl 500 mg BID and IV Rocephin 2 g qD with scheduled ID (Dr. Gary Turpin) follow-up.    Today, patient presents with mild pain and numbness in right third toe. Per patient and wife at bedside, he had appointment with Vascular Surgery scheduled for today but decided to come to the ED due to worsening appearance of toe (was starting to appear darker) in addition to feeling short of breath, fatigue, and loss of appetite for past three days. + Pain in R foot, + loss of appetite, + fatigue, + shortness of breath x3 days. Denies fever/chills/HA/change in vision/rhinorrhea/sore throat/cough/chest pain/abdominal pain/constipation/diarrhea.    Interval History:  Patient seen and examined at bedside. No acute events overnight. For OR washout with podiatry tomorrow 12/17.    Past Medical History:  GERD (gastroesophageal reflux disease)  Depression  Diabetic foot ulcer  Dyslipidemia  HTN (hypertension)  DM (diabetes mellitus)    Past Surgical History:  Toe amputation status, right    Allergies:  No Known Allergies    Medications:  PRN:  acetaminophen   Tablet .. 650 milliGRAM(s) Oral every 6 hours PRN Mild Pain (1 - 3)    Standing:  aspirin enteric coated 81 milliGRAM(s) Oral daily  atorvastatin 40 milliGRAM(s) Oral at bedtime  enoxaparin Injectable 40 milliGRAM(s) SubCutaneous at bedtime  insulin glargine Injectable (LANTUS) 30 Unit(s) SubCutaneous at bedtime  insulin lispro (HumaLOG) corrective regimen sliding scale   SubCutaneous three times a day before meals  insulin lispro Injectable (HumaLOG) 15 Unit(s) SubCutaneous three times a day before meals  lactulose Syrup 20 Gram(s) Oral daily  levoFLOXacin IVPB 750 milliGRAM(s) IV Intermittent every 24 hours  lisinopril 40 milliGRAM(s) Oral daily  meropenem  IVPB 1000 milliGRAM(s) IV Intermittent every 8 hours  NIFEdipine XL 60 milliGRAM(s) Oral daily  pantoprazole    Tablet 40 milliGRAM(s) Oral before breakfast  polyethylene glycol 3350 17 Gram(s) Oral daily  senna 2 Tablet(s) Oral at bedtime    Home:  benazepril 40 mg oral tablet: 1 tab(s) orally once a day  Crestor 10 mg oral tablet: 1 tab(s) orally once a day (at bedtime)  HumuLIN 70/30 KwikPen 70 units-30 units/mL subcutaneous suspension: 50 unit(s) subcutaneous once a day before breakfast  HumuLIN 70/30 subcutaneous suspension: 40 unit(s) subcutaneous once a day (at bedtime)  Janumet 50 mg-1000 mg oral tablet: 1 tab(s) orally 2 times a day  omeprazole 20 mg oral delayed release capsule: 1 cap(s) orally once a day    Vitals:  T(C): 36.4, Max: 36.6 (12-15-19 @ 14:31)  T(F): 97.6, Max: 97.8 (12-15-19 @ 14:31)  HR: 84 (84 - 94)  BP: 149/74 (134/63 - 149/74)  RR: 18 (18 - 18)  SpO2: --    Physical Exam:  General: Awake, Alert. Not in acute distress.  Heart: Regular rate and rhythm; S1, S2; No murmurs.  Lungs: Clear to auscultation bilaterally.  Abdomen: Soft, nontender, nondistended.  Extremities: RLE wrapped  Neuro: AAOx3, No focal deficits.    Labs:  CBC (12-16 @ 06:50)                        Hgb: 9.5    WBC: 9.16  )-----------------( Plts: 447                              Hct: 30.6     Chem (12-16 @ 06:50)  Na: 143  |     Cl: 104     |  BUN: 14  -----------------------------------------< Gluc: 79    K: 4.9   |    HCO3: 25  |  Cr: 0.9    Ca 9.4 (12-16 @ 06:50)  Mg 1.8 (12-16 @ 06:50)    LFTs (12-16 @ 06:50)  TPro 6.7  /  Alb 3.2  TBili 0.3  /  DBili     AST 19  /  ALT 15  /  AlkPhos 259

## 2019-12-16 NOTE — PROGRESS NOTE ADULT - SUBJECTIVE AND OBJECTIVE BOX
PROGRESS NOTE   Pt seen @ bedside during AM rounds with attending present .  Dressing +Clean, +Dry, +Intact      Vital Signs Last 24 Hrs  T(C): 36.4 (16 Dec 2019 05:00), Max: 36.6 (15 Dec 2019 14:31)  T(F): 97.6 (16 Dec 2019 05:00), Max: 97.8 (15 Dec 2019 14:31)  HR: 84 (16 Dec 2019 05:00) (84 - 94)  BP: 149/74 (16 Dec 2019 05:00) (134/63 - 149/74)  BP(mean): --  RR: 18 (16 Dec 2019 05:00) (18 - 18)  SpO2: --                          9.5    9.16  )-----------( 447      ( 16 Dec 2019 06:50 )             30.6               12-16    143  |  104  |  14  ----------------------------<  79  4.9   |  25  |  0.9    Ca    9.4      16 Dec 2019 06:50  Mg     1.8     12-16    TPro  6.7  /  Alb  3.2<L>  /  TBili  0.3  /  DBili  x   /  AST  19  /  ALT  15  /  AlkPhos  259<H>  12-16      PHYSICAL EXAM  Right LE Focused:  DERM:   Surgical site R foot - Sutures Intact and wound edges are well coapted w/o signs of Infection Along Suture Site and wound Dehiscences   No signs of drainage   Superficial Ulcer Dorsal Left Foot - Stable     Assessment:  S/p TMA, right foot. (DOS: 12/11/2019)  s/p TLS drain removed 12/13  Sutures Site Stable; No Clinical Signs of Infection     Plan:  Patient seen and evaluated. All questions and concerns were addressed and answered   Weight Bearing Status; Continue Non-Weight Bearing Right Foot   Request VNS for Continued Dressing Changes; Post Discharge  Surgery Currently Scheduled for Tuesday 12/17 4pm  for Possible Debridement and Washout  NPO after breakfast   Please optimize all labs  Attending Updated and Aware

## 2019-12-17 LAB
ALBUMIN SERPL ELPH-MCNC: 3.2 G/DL — LOW (ref 3.5–5.2)
ALP SERPL-CCNC: 241 U/L — HIGH (ref 30–115)
ALT FLD-CCNC: 14 U/L — SIGNIFICANT CHANGE UP (ref 0–41)
ANION GAP SERPL CALC-SCNC: 12 MMOL/L — SIGNIFICANT CHANGE UP (ref 7–14)
ANION GAP SERPL CALC-SCNC: 12 MMOL/L — SIGNIFICANT CHANGE UP (ref 7–14)
APTT BLD: 40.5 SEC — HIGH (ref 27–39.2)
AST SERPL-CCNC: 18 U/L — SIGNIFICANT CHANGE UP (ref 0–41)
BASOPHILS # BLD AUTO: 0.03 K/UL — SIGNIFICANT CHANGE UP (ref 0–0.2)
BASOPHILS NFR BLD AUTO: 0.4 % — SIGNIFICANT CHANGE UP (ref 0–1)
BILIRUB SERPL-MCNC: 0.3 MG/DL — SIGNIFICANT CHANGE UP (ref 0.2–1.2)
BLD GP AB SCN SERPL QL: SIGNIFICANT CHANGE UP
BUN SERPL-MCNC: 15 MG/DL — SIGNIFICANT CHANGE UP (ref 10–20)
BUN SERPL-MCNC: 16 MG/DL — SIGNIFICANT CHANGE UP (ref 10–20)
CALCIUM SERPL-MCNC: 9.3 MG/DL — SIGNIFICANT CHANGE UP (ref 8.5–10.1)
CALCIUM SERPL-MCNC: 9.3 MG/DL — SIGNIFICANT CHANGE UP (ref 8.5–10.1)
CHLORIDE SERPL-SCNC: 100 MMOL/L — SIGNIFICANT CHANGE UP (ref 98–110)
CHLORIDE SERPL-SCNC: 102 MMOL/L — SIGNIFICANT CHANGE UP (ref 98–110)
CO2 SERPL-SCNC: 26 MMOL/L — SIGNIFICANT CHANGE UP (ref 17–32)
CO2 SERPL-SCNC: 26 MMOL/L — SIGNIFICANT CHANGE UP (ref 17–32)
CREAT SERPL-MCNC: 1 MG/DL — SIGNIFICANT CHANGE UP (ref 0.7–1.5)
CREAT SERPL-MCNC: 1.2 MG/DL — SIGNIFICANT CHANGE UP (ref 0.7–1.5)
EOSINOPHIL # BLD AUTO: 0.05 K/UL — SIGNIFICANT CHANGE UP (ref 0–0.7)
EOSINOPHIL NFR BLD AUTO: 0.6 % — SIGNIFICANT CHANGE UP (ref 0–8)
GLUCOSE BLDC GLUCOMTR-MCNC: 115 MG/DL — HIGH (ref 70–99)
GLUCOSE BLDC GLUCOMTR-MCNC: 116 MG/DL — HIGH (ref 70–99)
GLUCOSE BLDC GLUCOMTR-MCNC: 153 MG/DL — HIGH (ref 70–99)
GLUCOSE BLDC GLUCOMTR-MCNC: 162 MG/DL — HIGH (ref 70–99)
GLUCOSE SERPL-MCNC: 114 MG/DL — HIGH (ref 70–99)
GLUCOSE SERPL-MCNC: 206 MG/DL — HIGH (ref 70–99)
HCT VFR BLD CALC: 29.5 % — LOW (ref 42–52)
HGB BLD-MCNC: 9.3 G/DL — LOW (ref 14–18)
IMM GRANULOCYTES NFR BLD AUTO: 0.8 % — HIGH (ref 0.1–0.3)
INR BLD: 1.29 RATIO — SIGNIFICANT CHANGE UP (ref 0.65–1.3)
LYMPHOCYTES # BLD AUTO: 1.31 K/UL — SIGNIFICANT CHANGE UP (ref 1.2–3.4)
LYMPHOCYTES # BLD AUTO: 15.4 % — LOW (ref 20.5–51.1)
MAGNESIUM SERPL-MCNC: 2 MG/DL — SIGNIFICANT CHANGE UP (ref 1.8–2.4)
MCHC RBC-ENTMCNC: 27.7 PG — SIGNIFICANT CHANGE UP (ref 27–31)
MCHC RBC-ENTMCNC: 31.5 G/DL — LOW (ref 32–37)
MCV RBC AUTO: 87.8 FL — SIGNIFICANT CHANGE UP (ref 80–94)
MONOCYTES # BLD AUTO: 0.66 K/UL — HIGH (ref 0.1–0.6)
MONOCYTES NFR BLD AUTO: 7.8 % — SIGNIFICANT CHANGE UP (ref 1.7–9.3)
NEUTROPHILS # BLD AUTO: 6.37 K/UL — SIGNIFICANT CHANGE UP (ref 1.4–6.5)
NEUTROPHILS NFR BLD AUTO: 75 % — SIGNIFICANT CHANGE UP (ref 42.2–75.2)
NRBC # BLD: 0 /100 WBCS — SIGNIFICANT CHANGE UP (ref 0–0)
PLATELET # BLD AUTO: 468 K/UL — HIGH (ref 130–400)
POTASSIUM SERPL-MCNC: 5 MMOL/L — SIGNIFICANT CHANGE UP (ref 3.5–5)
POTASSIUM SERPL-MCNC: 5.1 MMOL/L — HIGH (ref 3.5–5)
POTASSIUM SERPL-SCNC: 5 MMOL/L — SIGNIFICANT CHANGE UP (ref 3.5–5)
POTASSIUM SERPL-SCNC: 5.1 MMOL/L — HIGH (ref 3.5–5)
PROT SERPL-MCNC: 7 G/DL — SIGNIFICANT CHANGE UP (ref 6–8)
PROTHROM AB SERPL-ACNC: 14.8 SEC — HIGH (ref 9.95–12.87)
RBC # BLD: 3.36 M/UL — LOW (ref 4.7–6.1)
RBC # FLD: 13.7 % — SIGNIFICANT CHANGE UP (ref 11.5–14.5)
SODIUM SERPL-SCNC: 138 MMOL/L — SIGNIFICANT CHANGE UP (ref 135–146)
SODIUM SERPL-SCNC: 140 MMOL/L — SIGNIFICANT CHANGE UP (ref 135–146)
WBC # BLD: 8.49 K/UL — SIGNIFICANT CHANGE UP (ref 4.8–10.8)
WBC # FLD AUTO: 8.49 K/UL — SIGNIFICANT CHANGE UP (ref 4.8–10.8)

## 2019-12-17 PROCEDURE — 97597 DBRDMT OPN WND 1ST 20 CM/<: CPT | Mod: 59

## 2019-12-17 PROCEDURE — 99232 SBSQ HOSP IP/OBS MODERATE 35: CPT

## 2019-12-17 PROCEDURE — 11043 DBRDMT MUSC&/FSCA 1ST 20/<: CPT | Mod: 58,59

## 2019-12-17 PROCEDURE — 12020 TX SUPFC WND DEHSN SMPL CLSR: CPT | Mod: 58

## 2019-12-17 RX ORDER — INSULIN LISPRO 100/ML
VIAL (ML) SUBCUTANEOUS
Refills: 0 | Status: DISCONTINUED | OUTPATIENT
Start: 2019-12-17 | End: 2019-12-19

## 2019-12-17 RX ORDER — HYDROMORPHONE HYDROCHLORIDE 2 MG/ML
0.25 INJECTION INTRAMUSCULAR; INTRAVENOUS; SUBCUTANEOUS
Refills: 0 | Status: DISCONTINUED | OUTPATIENT
Start: 2019-12-17 | End: 2019-12-18

## 2019-12-17 RX ORDER — POLYETHYLENE GLYCOL 3350 17 G/17G
17 POWDER, FOR SOLUTION ORAL DAILY
Refills: 0 | Status: DISCONTINUED | OUTPATIENT
Start: 2019-12-17 | End: 2019-12-19

## 2019-12-17 RX ORDER — ONDANSETRON 8 MG/1
4 TABLET, FILM COATED ORAL ONCE
Refills: 0 | Status: DISCONTINUED | OUTPATIENT
Start: 2019-12-17 | End: 2019-12-18

## 2019-12-17 RX ORDER — HYDROMORPHONE HYDROCHLORIDE 2 MG/ML
0.5 INJECTION INTRAMUSCULAR; INTRAVENOUS; SUBCUTANEOUS
Refills: 0 | Status: DISCONTINUED | OUTPATIENT
Start: 2019-12-17 | End: 2019-12-18

## 2019-12-17 RX ORDER — ASPIRIN/CALCIUM CARB/MAGNESIUM 324 MG
81 TABLET ORAL DAILY
Refills: 0 | Status: DISCONTINUED | OUTPATIENT
Start: 2019-12-17 | End: 2019-12-19

## 2019-12-17 RX ORDER — DEXTROSE 10 % IN WATER 10 %
250 INTRAVENOUS SOLUTION INTRAVENOUS ONCE
Refills: 0 | Status: COMPLETED | OUTPATIENT
Start: 2019-12-17 | End: 2019-12-17

## 2019-12-17 RX ORDER — ENOXAPARIN SODIUM 100 MG/ML
40 INJECTION SUBCUTANEOUS AT BEDTIME
Refills: 0 | Status: DISCONTINUED | OUTPATIENT
Start: 2019-12-17 | End: 2019-12-19

## 2019-12-17 RX ORDER — SENNA PLUS 8.6 MG/1
2 TABLET ORAL AT BEDTIME
Refills: 0 | Status: DISCONTINUED | OUTPATIENT
Start: 2019-12-17 | End: 2019-12-19

## 2019-12-17 RX ORDER — OXYCODONE AND ACETAMINOPHEN 5; 325 MG/1; MG/1
1 TABLET ORAL ONCE
Refills: 0 | Status: DISCONTINUED | OUTPATIENT
Start: 2019-12-17 | End: 2019-12-18

## 2019-12-17 RX ORDER — LISINOPRIL 2.5 MG/1
40 TABLET ORAL DAILY
Refills: 0 | Status: DISCONTINUED | OUTPATIENT
Start: 2019-12-17 | End: 2019-12-19

## 2019-12-17 RX ORDER — SODIUM CHLORIDE 9 MG/ML
1000 INJECTION, SOLUTION INTRAVENOUS
Refills: 0 | Status: DISCONTINUED | OUTPATIENT
Start: 2019-12-17 | End: 2019-12-18

## 2019-12-17 RX ORDER — MEPERIDINE HYDROCHLORIDE 50 MG/ML
12.5 INJECTION INTRAMUSCULAR; INTRAVENOUS; SUBCUTANEOUS
Refills: 0 | Status: DISCONTINUED | OUTPATIENT
Start: 2019-12-17 | End: 2019-12-18

## 2019-12-17 RX ORDER — LACTULOSE 10 G/15ML
20 SOLUTION ORAL DAILY
Refills: 0 | Status: DISCONTINUED | OUTPATIENT
Start: 2019-12-17 | End: 2019-12-19

## 2019-12-17 RX ORDER — NIFEDIPINE 30 MG
60 TABLET, EXTENDED RELEASE 24 HR ORAL DAILY
Refills: 0 | Status: DISCONTINUED | OUTPATIENT
Start: 2019-12-17 | End: 2019-12-19

## 2019-12-17 RX ORDER — INSULIN GLARGINE 100 [IU]/ML
30 INJECTION, SOLUTION SUBCUTANEOUS AT BEDTIME
Refills: 0 | Status: DISCONTINUED | OUTPATIENT
Start: 2019-12-17 | End: 2019-12-19

## 2019-12-17 RX ORDER — INSULIN HUMAN 100 [IU]/ML
10 INJECTION, SOLUTION SUBCUTANEOUS ONCE
Refills: 0 | Status: COMPLETED | OUTPATIENT
Start: 2019-12-17 | End: 2019-12-17

## 2019-12-17 RX ORDER — MEROPENEM 1 G/30ML
1000 INJECTION INTRAVENOUS EVERY 8 HOURS
Refills: 0 | Status: DISCONTINUED | OUTPATIENT
Start: 2019-12-17 | End: 2019-12-19

## 2019-12-17 RX ORDER — ATORVASTATIN CALCIUM 80 MG/1
40 TABLET, FILM COATED ORAL AT BEDTIME
Refills: 0 | Status: DISCONTINUED | OUTPATIENT
Start: 2019-12-17 | End: 2019-12-19

## 2019-12-17 RX ORDER — INSULIN LISPRO 100/ML
15 VIAL (ML) SUBCUTANEOUS
Refills: 0 | Status: DISCONTINUED | OUTPATIENT
Start: 2019-12-17 | End: 2019-12-19

## 2019-12-17 RX ORDER — PANTOPRAZOLE SODIUM 20 MG/1
40 TABLET, DELAYED RELEASE ORAL
Refills: 0 | Status: DISCONTINUED | OUTPATIENT
Start: 2019-12-17 | End: 2019-12-19

## 2019-12-17 RX ADMIN — INSULIN HUMAN 10 UNIT(S): 100 INJECTION, SOLUTION SUBCUTANEOUS at 10:46

## 2019-12-17 RX ADMIN — MEROPENEM 100 MILLIGRAM(S): 1 INJECTION INTRAVENOUS at 14:56

## 2019-12-17 RX ADMIN — Medication 3000 MILLILITER(S): at 10:49

## 2019-12-17 RX ADMIN — Medication 15 UNIT(S): at 08:26

## 2019-12-17 RX ADMIN — MEROPENEM 100 MILLIGRAM(S): 1 INJECTION INTRAVENOUS at 06:33

## 2019-12-17 RX ADMIN — PANTOPRAZOLE SODIUM 40 MILLIGRAM(S): 20 TABLET, DELAYED RELEASE ORAL at 06:33

## 2019-12-17 RX ADMIN — Medication 81 MILLIGRAM(S): at 11:11

## 2019-12-17 RX ADMIN — ENOXAPARIN SODIUM 40 MILLIGRAM(S): 100 INJECTION SUBCUTANEOUS at 22:31

## 2019-12-17 RX ADMIN — INSULIN GLARGINE 30 UNIT(S): 100 INJECTION, SOLUTION SUBCUTANEOUS at 22:30

## 2019-12-17 RX ADMIN — LISINOPRIL 40 MILLIGRAM(S): 2.5 TABLET ORAL at 06:33

## 2019-12-17 RX ADMIN — MEROPENEM 100 MILLIGRAM(S): 1 INJECTION INTRAVENOUS at 22:47

## 2019-12-17 RX ADMIN — ATORVASTATIN CALCIUM 40 MILLIGRAM(S): 80 TABLET, FILM COATED ORAL at 22:05

## 2019-12-17 RX ADMIN — Medication 60 MILLIGRAM(S): at 06:33

## 2019-12-17 NOTE — PRE-OP CHECKLIST - TO WHOM
Gretel Knowles RN
Frank CHRISTENSEN
JOSEPHINE Walker RN
BIN MARTINEZ RN
positive S1/positive S2/murmur

## 2019-12-17 NOTE — PROGRESS NOTE ADULT - SUBJECTIVE AND OBJECTIVE BOX
Patient Information:  JOSÉ MANUEL PORTER / 69y / Male / MRN#:[id number]    Hospital Day: 14d    HPI:  69 year-old male with PMH of insulin-dependent DM c/b diabetic foot ulcers s/p amputations of half of left great toe and entire right great toe, PVD, HTN, HLD, GERD, and depression who presents with worsening of a previously diagnosed R third toe wound. Patient was recently admitted to [facility] from 11/14-11/21 for worsening of the same diabetic foot ulcer of right foot with OM with cultures growing MSSA; patient had PICC line placed prior to discharge and was receiving PO Flagyl 500 mg BID and IV Rocephin 2 g qD with scheduled ID (Dr. Gary Turpin) follow-up.    Today, patient presents with mild pain and numbness in right third toe. Per patient and wife at bedside, he had appointment with Vascular Surgery scheduled for today but decided to come to the ED due to worsening appearance of toe (was starting to appear darker) in addition to feeling short of breath, fatigue, and loss of appetite for past three days. + Pain in R foot, + loss of appetite, + fatigue, + shortness of breath x3 days. Denies fever/chills/HA/change in vision/rhinorrhea/sore throat/cough/chest pain/abdominal pain/constipation/diarrhea.    Interval History:  Patient seen and examined at bedside. No acute events overnight. Patient NPO after breakfast for washout with Podiatry today.    Past Medical History:  GERD (gastroesophageal reflux disease)  Depression  Diabetic foot ulcer  Dyslipidemia  HTN (hypertension)  DM (diabetes mellitus)    Past Surgical History:  Toe amputation status, right    Allergies:  No Known Allergies    Medications:  PRN:  acetaminophen   Tablet .. 650 milliGRAM(s) Oral every 6 hours PRN Mild Pain (1 - 3)    Standing:  aspirin enteric coated 81 milliGRAM(s) Oral daily  atorvastatin 40 milliGRAM(s) Oral at bedtime  dextrose 10% Bolus 250 milliLiter(s) IV Bolus once  enoxaparin Injectable 40 milliGRAM(s) SubCutaneous at bedtime  insulin glargine Injectable (LANTUS) 30 Unit(s) SubCutaneous at bedtime  insulin lispro (HumaLOG) corrective regimen sliding scale   SubCutaneous three times a day before meals  insulin lispro Injectable (HumaLOG) 15 Unit(s) SubCutaneous three times a day before meals  insulin regular  human recombinant. 10 Unit(s) IV Push once  lactulose Syrup 20 Gram(s) Oral daily  levoFLOXacin IVPB 750 milliGRAM(s) IV Intermittent every 24 hours  lisinopril 40 milliGRAM(s) Oral daily  meropenem  IVPB 1000 milliGRAM(s) IV Intermittent every 8 hours  NIFEdipine XL 60 milliGRAM(s) Oral daily  pantoprazole    Tablet 40 milliGRAM(s) Oral before breakfast  polyethylene glycol 3350 17 Gram(s) Oral daily  senna 2 Tablet(s) Oral at bedtime    Home:  benazepril 40 mg oral tablet: 1 tab(s) orally once a day  Crestor 10 mg oral tablet: 1 tab(s) orally once a day (at bedtime)  HumuLIN 70/30 KwikPen 70 units-30 units/mL subcutaneous suspension: 50 unit(s) subcutaneous once a day before breakfast  HumuLIN 70/30 subcutaneous suspension: 40 unit(s) subcutaneous once a day (at bedtime)  Janumet 50 mg-1000 mg oral tablet: 1 tab(s) orally 2 times a day  omeprazole 20 mg oral delayed release capsule: 1 cap(s) orally once a day    Vitals:  T(C): 36.9, Max: 36.9 (12-17-19 @ 06:02)  T(F): 98.4, Max: 98.4 (12-17-19 @ 06:02)  HR: 80 (80 - 99)  BP: 120/61 (120/61 - 142/63)  RR: 18 (18 - 18)  SpO2: 98% (98% - 98%)    Physical Exam:  General: Awake, Alert. Not in acute distress.  Heart: Regular rate and rhythm; S1, S2; No murmurs.  Lungs: Clear to auscultation bilaterally.  Abdomen: Soft, nontender, nondistended.  Extremities: Right foot wrapped  Neuro: AAOx3, No focal deficits.    Labs:  CBC (12-17 @ 07:02)                        Hgb: 9.3    WBC: 8.49  )-----------------( Plts: 468                              Hct: 29.5     Chem (12-17 @ 07:02)  Na: 140  |     Cl: 102     |  BUN: 15  -----------------------------------------< Gluc: 114    K: 5.1   |    HCO3: 26  |  Cr: 1.0    Ca 9.3 (12-17 @ 07:02)  Mg 2.0 (12-17 @ 07:02)    LFTs (12-17 @ 07:02)  TPro 7.0  /  Alb 3.2  TBili 0.3  /  DBili     AST 18  /  ALT 14  /  AlkPhos 241    PT/INR (12-17 @ 07:02)  PT: 14.80; INR: 1.29   PTT: 40.5

## 2019-12-17 NOTE — PROGRESS NOTE ADULT - ASSESSMENT
69 year-old male with PMH of insulin-dependent DM c/b diabetic foot ulcers s/p amputations of half of left great toe and entire right great toe, PVD, HTN, HLD, GERD, and depression who p/w worsening of a previously diagnosed R third toe wound found to have worsening gangrene requiring amputation of R third toe.    #Right third toe gangrene/ OM/ septic arthritis in setting of PVD in RLE  #2nd MTP septic arthritis   - On admission: Afebrile (Tmax 100.0F), WBC 13.2  - ESR 70, CRP 6.93  - Blood cultures negative, OR culture growing enterobacter (R cefepime, zosyn, S fluoro/bactrim)  - XR Right foot: Gangrene right 3rd toe and recurrent osteomyelitis, worsening right 3rd MTP septic arthritis and osteomyelitis, right 2nd MTP worsened septic arthritis  - S/p Amputation of right third toe on 12/4  - Vascular follow up as outpatient with Dr. Espinosa  - Baseline Hgb ~10, Monitor CBC, Transfuse if <7, Keep active Type and Screen  - Patient has PICC line from previous admission, can be removed prior to discharge  - S/p podiatry procedure 12/11  - Per ID, continue Levaquin 750mg IV Daily and Meropenem 1g IV q8H, can be discharged on PO Levaquin/Flagyl  - Per Podiatry, for OR washout today 12/17    #UTI  - Increased frequency, positive dysuria  - UA negative    #Insulin-dependent T2DM  - Monitor fingersticks with meals and at bedtime  - Continue Lantus 30 qHS, Lispro 15 TID AC, Sliding scale x1    #Dyspnea with fatigue, loss of appetite x3 days - resolved  - Characterizes dyspnea on exertion. Denies orthopnea or PND. No prior history of CHF  - TTE: EF 55-60%, Grade 1 diastolic dysfunction, borderline pulmonary hypertension  - CXR negative     #TYLER -resolved  - Baseline Cr 0.9-1.1     #Hypertension -well controlled  - Takes Benazepril 40mg Daily at home  - Continue Lisinopril 40mg Daily  - Monitor BP    #Hyperlipidemia  - Continue Atorvastatin 40mg qHS    #GERD  - Continue Protonix    #Depression  - Was taking Sertraline 50mg Daily. Per patient and pharmacy, has not been taking medication since August  - Follow-up outpatient to resume meds  - Monitor for any SI/HI while inpatient    #Pending: Podiatry OR for washout today 12/17  #Disposition: From home, discharge to [facility]  #Diet: DASH/TLC  #GI Prophylaxis: Protonix 40mg PO Daily  #DVT Prophylaxis: Lovenox 40mg SubQ qHS  #Activity: Ambulate as tolerated, NWB RLE  #Code Status: Full Code

## 2019-12-17 NOTE — PRE-ANESTHESIA EVALUATION ADULT - NSANTHAPLANRD_GEN_ALL_CORE
monitored anesthesia care (MAC)
general
monitored anesthesia care (MAC)
monitored anesthesia care (MAC)

## 2019-12-17 NOTE — BRIEF OPERATIVE NOTE - NSICDXBRIEFPROCEDURE_GEN_ALL_CORE_FT
PROCEDURES:  Closure, wound, delayed 11-Dec-2019 19:04:50  Royal Bhatia  Partial amputation of fifth ray of right foot by open approach 11-Dec-2019 19:04:43  Royal Bhatia  Partial amputation of fourth ray of right foot by open approach 11-Dec-2019 19:04:25  Royal Bhatia  Partial amputation of second ray of right foot by open approach 11-Dec-2019 19:03:37  Royal Bhatia
PROCEDURES:  Pulsed irrigation of wound 17-Dec-2019 19:46:54  Royal Bhatia  Treatment of superficial wound dehiscence by simple closure 17-Dec-2019 19:46:37  Royal Bhatia  Debridement of fascia 17-Dec-2019 19:46:12  Royal Bhatia  Closure, wound, delayed 11-Dec-2019 19:04:50  Royal Bhatia
PROCEDURES:  Ray amputation of third toe of right foot 04-Dec-2019 16:07:01  Royal Bhatia

## 2019-12-17 NOTE — BRIEF OPERATIVE NOTE - NSICDXBRIEFPREOP_GEN_ALL_CORE_FT
PRE-OP DIAGNOSIS:  Osteomyelitis 11-Dec-2019 19:05:30  Royal Bhatia  Gangrene of toe of right foot 04-Dec-2019 16:07:26  Royal Bhatia
PRE-OP DIAGNOSIS:  Wound dehiscence 17-Dec-2019 19:48:46  Royal Bhatia  Wound dehiscence 17-Dec-2019 19:48:25  Royal Bhatia
PRE-OP DIAGNOSIS:  Gangrene of toe of right foot 04-Dec-2019 16:07:26  Royal Bhatia

## 2019-12-17 NOTE — PRE-ANESTHESIA EVALUATION ADULT - MALLAMPATI CLASS
Class III - visualization of the soft palate and the base of the uvula
Class III - visualization of the soft palate and the base of the uvula
Class II - visualization of the soft palate, fauces, and uvula
Class II - visualization of the soft palate, fauces, and uvula

## 2019-12-17 NOTE — PROGRESS NOTE ADULT - ASSESSMENT
69M w/    #Right foot toe gangrene, OM, septic arthritis- patient is s/p surgical amputation of 2nd,4th, 5th toes, continue IV abx. tx.- Meropenem,levaquin via PICC.  pain mgmt.  washout of right foot post op area today  #Dysuria - UA neg, on abx  # DM 2- continue insulin tx, well controlled  #HTN - continue current meds, BP ok  # h/o GERD, Depression, Dyslipidemia-  stable, resumed on home regimen tx.   # PPx - lovenox    Progress Note Handoff  Pending:  washout today  Patient/Family discussion: discussed plan w/ pt  Disposition: pt says he will likely go to [facility] in [place] where his daughter lives, 24-48h

## 2019-12-17 NOTE — PROGRESS NOTE ADULT - SUBJECTIVE AND OBJECTIVE BOX
CHARLOTTE JOSÉ MANUEL  69y, Male  Allergy: No Known Allergies    Hospital Day: 14d    Patient seen and examined earlier today.  No complaints.    PMH/PSH:  PAST MEDICAL & SURGICAL HISTORY:  GERD (gastroesophageal reflux disease)  Depression  Diabetic foot ulcer  Dyslipidemia  HTN (hypertension)  DM (diabetes mellitus): 12/27/18 hgb aic  11.2  Toe amputation status, right: (2008)      VITALS:  T(F): 98.4 (12-17-19 @ 06:02), Max: 98.4 (12-17-19 @ 06:02)  HR: 80 (12-17-19 @ 06:02)  BP: 120/61 (12-17-19 @ 06:02) (120/61 - 142/63)  RR: 18 (12-17-19 @ 06:02)  SpO2: 98% (12-17-19 @ 08:08)    PHYSICAL EXAM:  GENERAL: NAD  HEENT: MMM  CVS:  RRR  CHEST/LUNG: Good air entry, no wheeze  ABD:  soft, NT/ND +bs  EXTREMITIES:  Rt foot wrapped  NEURO: AOx3    TESTS & MEASUREMENTS:                          9.3    8.49  )-----------( 468      ( 17 Dec 2019 07:02 )             29.5     PT/INR - ( 17 Dec 2019 07:02 )   PT: 14.80 sec;   INR: 1.29 ratio         PTT - ( 17 Dec 2019 07:02 )  PTT:40.5 sec  12-17    140  |  102  |  15  ----------------------------<  114<H>  5.1<H>   |  26  |  1.0    Ca    9.3      17 Dec 2019 07:02  Mg     2.0     12-17    TPro  7.0  /  Alb  3.2<L>  /  TBili  0.3  /  DBili  x   /  AST  18  /  ALT  14  /  AlkPhos  241<H>  12-17    LIVER FUNCTIONS - ( 17 Dec 2019 07:02 )  Alb: 3.2 g/dL / Pro: 7.0 g/dL / ALK PHOS: 241 U/L / ALT: 14 U/L / AST: 18 U/L / GGT: x             RADIOLOGY & ADDITIONAL TESTS:  < from: Xray Foot AP + Lateral + Oblique, Right (12.11.19 @ 21:15) >  IMPRESSION:    Interval amputation of the second, fourth, and fifth digits to the distal   metatarsals. No radiographic evidence for residual osteomyelitis.   Degenerative change in the midfoot and hindfoot. Vascular calcifications.     < end of copied text >      MEDICATIONS:  MEDICATIONS  (STANDING):  aspirin enteric coated 81 milliGRAM(s) Oral daily  atorvastatin 40 milliGRAM(s) Oral at bedtime  enoxaparin Injectable 40 milliGRAM(s) SubCutaneous at bedtime  insulin glargine Injectable (LANTUS) 30 Unit(s) SubCutaneous at bedtime  insulin lispro (HumaLOG) corrective regimen sliding scale   SubCutaneous three times a day before meals  insulin lispro Injectable (HumaLOG) 15 Unit(s) SubCutaneous three times a day before meals  lactulose Syrup 20 Gram(s) Oral daily  levoFLOXacin IVPB 750 milliGRAM(s) IV Intermittent every 24 hours  lisinopril 40 milliGRAM(s) Oral daily  meropenem  IVPB 1000 milliGRAM(s) IV Intermittent every 8 hours  NIFEdipine XL 60 milliGRAM(s) Oral daily  pantoprazole    Tablet 40 milliGRAM(s) Oral before breakfast  polyethylene glycol 3350 17 Gram(s) Oral daily  senna 2 Tablet(s) Oral at bedtime    MEDICATIONS  (PRN):  acetaminophen   Tablet .. 650 milliGRAM(s) Oral every 6 hours PRN Mild Pain (1 - 3)      HOME MEDICATIONS:  benazepril 40 mg oral tablet (12-03)  Crestor 10 mg oral tablet (12-03)  HumuLIN 70/30 KwikPen 70 units-30 units/mL subcutaneous suspension (12-03)  HumuLIN 70/30 subcutaneous suspension (12-03)  Janumet 50 mg-1000 mg oral tablet (12-03)  omeprazole 20 mg oral delayed release capsule (12-03)

## 2019-12-17 NOTE — BRIEF OPERATIVE NOTE - NSICDXBRIEFPOSTOP_GEN_ALL_CORE_FT
POST-OP DIAGNOSIS:  Osteomyelitis 11-Dec-2019 19:05:43  Royal Bhatia  Gangrene of toe of right foot 04-Dec-2019 16:07:57  Royal Bhatia
POST-OP DIAGNOSIS:  Wound dehiscence 17-Dec-2019 19:49:38  Royal Bhatia
POST-OP DIAGNOSIS:  Abscess 04-Dec-2019 16:08:09  Royal Bhatia  Gangrene of toe of right foot 04-Dec-2019 16:07:57  Royal Bhtaia

## 2019-12-17 NOTE — CHART NOTE - NSCHARTNOTEFT_GEN_A_CORE
PACU ANESTHESIA ADMISSION NOTE      Procedure: Pulsed irrigation of wound  Treatment of superficial wound dehiscence by simple closure  Debridement of fascia  Closure, wound, delayed  Partial amputation of fifth ray of right foot by open approach  Partial amputation of fourth ray of right foot by open approach  Partial amputation of second ray of right foot by open approach  Ray amputation of third toe of right foot    Post op diagnosis:  Wound dehiscence  Osteomyelitis  Abscess  Gangrene of toe of right foot      ____  Intubated  TV:______       Rate: ______      FiO2: ______    _x___  Patent Airway    _x___  Full return of protective reflexes    _x___  Full recovery from anesthesia / back to baseline status    Vitals:            T:     98.4           BP :    139/73           R:   16           Sat: 100%              P: 80      Mental Status:  _x___ Awake   _____ Alert   _____ Drowsy   _____ Sedated    Nausea/Vomiting:  _x___  NO       ______Yes,   See Post - Op Orders         Pain Scale (0-10):  __0___    Treatment: ___ None    __x__ See Post - Op/PCA Orders    Post - Operative Fluids:   __x__ Oral   ____ See Post - Op Orders    Plan: Discharge:   ___Home       __x___Floor     _____Critical Care    _____  Other:_________________    Comments:  No anesthesia issues or complications noted.  Discharge when criteria met.

## 2019-12-18 LAB
ALBUMIN SERPL ELPH-MCNC: 3.2 G/DL — LOW (ref 3.5–5.2)
ALP SERPL-CCNC: 230 U/L — HIGH (ref 30–115)
ALT FLD-CCNC: 13 U/L — SIGNIFICANT CHANGE UP (ref 0–41)
ANION GAP SERPL CALC-SCNC: 13 MMOL/L — SIGNIFICANT CHANGE UP (ref 7–14)
AST SERPL-CCNC: 14 U/L — SIGNIFICANT CHANGE UP (ref 0–41)
BASOPHILS # BLD AUTO: 0.04 K/UL — SIGNIFICANT CHANGE UP (ref 0–0.2)
BASOPHILS NFR BLD AUTO: 0.5 % — SIGNIFICANT CHANGE UP (ref 0–1)
BILIRUB SERPL-MCNC: 0.4 MG/DL — SIGNIFICANT CHANGE UP (ref 0.2–1.2)
BUN SERPL-MCNC: 18 MG/DL — SIGNIFICANT CHANGE UP (ref 10–20)
CALCIUM SERPL-MCNC: 9.1 MG/DL — SIGNIFICANT CHANGE UP (ref 8.5–10.1)
CHLORIDE SERPL-SCNC: 103 MMOL/L — SIGNIFICANT CHANGE UP (ref 98–110)
CO2 SERPL-SCNC: 25 MMOL/L — SIGNIFICANT CHANGE UP (ref 17–32)
CREAT SERPL-MCNC: 1 MG/DL — SIGNIFICANT CHANGE UP (ref 0.7–1.5)
EOSINOPHIL # BLD AUTO: 0.02 K/UL — SIGNIFICANT CHANGE UP (ref 0–0.7)
EOSINOPHIL NFR BLD AUTO: 0.3 % — SIGNIFICANT CHANGE UP (ref 0–8)
GLUCOSE BLDC GLUCOMTR-MCNC: 136 MG/DL — HIGH (ref 70–99)
GLUCOSE BLDC GLUCOMTR-MCNC: 140 MG/DL — HIGH (ref 70–99)
GLUCOSE BLDC GLUCOMTR-MCNC: 182 MG/DL — HIGH (ref 70–99)
GLUCOSE BLDC GLUCOMTR-MCNC: 272 MG/DL — HIGH (ref 70–99)
GLUCOSE SERPL-MCNC: 325 MG/DL — HIGH (ref 70–99)
HCT VFR BLD CALC: 30.3 % — LOW (ref 42–52)
HGB BLD-MCNC: 9.4 G/DL — LOW (ref 14–18)
IMM GRANULOCYTES NFR BLD AUTO: 0.8 % — HIGH (ref 0.1–0.3)
LYMPHOCYTES # BLD AUTO: 0.69 K/UL — LOW (ref 1.2–3.4)
LYMPHOCYTES # BLD AUTO: 8.9 % — LOW (ref 20.5–51.1)
MAGNESIUM SERPL-MCNC: 1.9 MG/DL — SIGNIFICANT CHANGE UP (ref 1.8–2.4)
MCHC RBC-ENTMCNC: 27.6 PG — SIGNIFICANT CHANGE UP (ref 27–31)
MCHC RBC-ENTMCNC: 31 G/DL — LOW (ref 32–37)
MCV RBC AUTO: 89.1 FL — SIGNIFICANT CHANGE UP (ref 80–94)
MONOCYTES # BLD AUTO: 0.7 K/UL — HIGH (ref 0.1–0.6)
MONOCYTES NFR BLD AUTO: 9.1 % — SIGNIFICANT CHANGE UP (ref 1.7–9.3)
NEUTROPHILS # BLD AUTO: 6.2 K/UL — SIGNIFICANT CHANGE UP (ref 1.4–6.5)
NEUTROPHILS NFR BLD AUTO: 80.4 % — HIGH (ref 42.2–75.2)
NRBC # BLD: 0 /100 WBCS — SIGNIFICANT CHANGE UP (ref 0–0)
PLATELET # BLD AUTO: 422 K/UL — HIGH (ref 130–400)
POTASSIUM SERPL-MCNC: 5.4 MMOL/L — HIGH (ref 3.5–5)
POTASSIUM SERPL-SCNC: 5.4 MMOL/L — HIGH (ref 3.5–5)
PROT SERPL-MCNC: 6.9 G/DL — SIGNIFICANT CHANGE UP (ref 6–8)
RBC # BLD: 3.4 M/UL — LOW (ref 4.7–6.1)
RBC # FLD: 13.7 % — SIGNIFICANT CHANGE UP (ref 11.5–14.5)
SODIUM SERPL-SCNC: 141 MMOL/L — SIGNIFICANT CHANGE UP (ref 135–146)
WBC # BLD: 7.71 K/UL — SIGNIFICANT CHANGE UP (ref 4.8–10.8)
WBC # FLD AUTO: 7.71 K/UL — SIGNIFICANT CHANGE UP (ref 4.8–10.8)

## 2019-12-18 PROCEDURE — 93010 ELECTROCARDIOGRAM REPORT: CPT

## 2019-12-18 PROCEDURE — 99233 SBSQ HOSP IP/OBS HIGH 50: CPT

## 2019-12-18 RX ORDER — SODIUM POLYSTYRENE SULFONATE 4.1 MEQ/G
15 POWDER, FOR SUSPENSION ORAL ONCE
Refills: 0 | Status: COMPLETED | OUTPATIENT
Start: 2019-12-18 | End: 2019-12-18

## 2019-12-18 RX ADMIN — LISINOPRIL 40 MILLIGRAM(S): 2.5 TABLET ORAL at 06:04

## 2019-12-18 RX ADMIN — Medication 81 MILLIGRAM(S): at 11:02

## 2019-12-18 RX ADMIN — Medication 60 MILLIGRAM(S): at 05:33

## 2019-12-18 RX ADMIN — INSULIN GLARGINE 30 UNIT(S): 100 INJECTION, SOLUTION SUBCUTANEOUS at 21:15

## 2019-12-18 RX ADMIN — ATORVASTATIN CALCIUM 40 MILLIGRAM(S): 80 TABLET, FILM COATED ORAL at 21:12

## 2019-12-18 RX ADMIN — Medication 3: at 08:47

## 2019-12-18 RX ADMIN — Medication 1: at 12:34

## 2019-12-18 RX ADMIN — Medication 15 UNIT(S): at 12:33

## 2019-12-18 RX ADMIN — ENOXAPARIN SODIUM 40 MILLIGRAM(S): 100 INJECTION SUBCUTANEOUS at 21:15

## 2019-12-18 RX ADMIN — PANTOPRAZOLE SODIUM 40 MILLIGRAM(S): 20 TABLET, DELAYED RELEASE ORAL at 06:04

## 2019-12-18 RX ADMIN — MEROPENEM 100 MILLIGRAM(S): 1 INJECTION INTRAVENOUS at 06:26

## 2019-12-18 RX ADMIN — MEROPENEM 100 MILLIGRAM(S): 1 INJECTION INTRAVENOUS at 21:16

## 2019-12-18 RX ADMIN — MEROPENEM 100 MILLIGRAM(S): 1 INJECTION INTRAVENOUS at 14:16

## 2019-12-18 RX ADMIN — SENNA PLUS 2 TABLET(S): 8.6 TABLET ORAL at 21:12

## 2019-12-18 RX ADMIN — SODIUM POLYSTYRENE SULFONATE 15 GRAM(S): 4.1 POWDER, FOR SUSPENSION ORAL at 11:02

## 2019-12-18 RX ADMIN — Medication 15 UNIT(S): at 17:30

## 2019-12-18 RX ADMIN — Medication 15 UNIT(S): at 08:47

## 2019-12-18 NOTE — PROGRESS NOTE ADULT - ASSESSMENT
69 year-old male with PMH of insulin-dependent DM c/b diabetic foot ulcers s/p amputations of half of left great toe and entire right great toe, PVD, HTN, HLD, GERD, and depression who p/w worsening of a previously diagnosed R third toe wound found to have worsening gangrene requiring amputation of R third toe.    #Right third toe gangrene/ OM/ septic arthritis in setting of PVD in RLE  #2nd MTP septic arthritis   - On admission: Afebrile (Tmax 100.0F), WBC 13.2  - ESR 70, CRP 6.93  - Blood cultures negative, OR culture growing enterobacter (R cefepime, zosyn, S fluoro/bactrim)  - XR Right foot: Gangrene right 3rd toe and recurrent osteomyelitis, worsening right 3rd MTP septic arthritis and osteomyelitis, right 2nd MTP worsened septic arthritis  - S/p Amputation of right third toe on 12/4  - Vascular follow up as outpatient with Dr. Espinosa  - Baseline Hgb ~10, Monitor CBC, Transfuse if <7, Keep active Type and Screen  - Patient has PICC line from previous admission, can be removed prior to discharge  - S/p podiatry procedure 12/11  - Per ID, continue Levaquin 750mg IV Daily and Meropenem 1g IV q8H, can be discharged on PO Levaquin/Flagyl  - Podiatry, s/p washout and closure yesterday 12/17    #UTI  - Increased frequency, positive dysuria  - UA negative    #Insulin-dependent T2DM  - Monitor fingersticks with meals and at bedtime  - Continue Lantus 30 qHS, Lispro 15 TID AC, Sliding scale x1    #Dyspnea with fatigue, loss of appetite x3 days - resolved  - Characterizes dyspnea on exertion. Denies orthopnea or PND. No prior history of CHF  - TTE: EF 55-60%, Grade 1 diastolic dysfunction, borderline pulmonary hypertension  - CXR negative     #TYLER -resolved  - Baseline Cr 0.9-1.1     #Hypertension -well controlled  - Takes Benazepril 40mg Daily at home  - Continue Lisinopril 40mg Daily  - Monitor BP    #Hyperlipidemia  - Continue Atorvastatin 40mg qHS    #GERD  - Continue Protonix    #Depression  - Was taking Sertraline 50mg Daily. Per patient and pharmacy, has not been taking medication since August  - Follow-up outpatient to resume meds  - Monitor for any SI/HI while inpatient    #Pending: Podiatry recommendations s/p washout on 12/17  #Disposition: From home, discharge to [facility]  #Diet: DASH/TLC  #GI Prophylaxis: Protonix 40mg PO Daily  #DVT Prophylaxis: Lovenox 40mg SubQ qHS  #Activity: Ambulate as tolerated, NWB RLE  #Code Status: Full Code 69 year-old male with PMH of insulin-dependent DM c/b diabetic foot ulcers s/p amputations of half of left great toe and entire right great toe, PVD, HTN, HLD, GERD, and depression who p/w worsening of a previously diagnosed R third toe wound found to have worsening gangrene requiring amputation of R third toe.    #Right third toe gangrene/ OM/ septic arthritis in setting of PVD in RLE  #2nd MTP septic arthritis   - On admission: Afebrile (Tmax 100.0F), WBC 13.2  - ESR 70, CRP 6.93  - Blood cultures negative, OR culture growing enterobacter (R cefepime, zosyn, S fluoro/bactrim)  - XR Right foot: Gangrene right 3rd toe and recurrent osteomyelitis, worsening right 3rd MTP septic arthritis and osteomyelitis, right 2nd MTP worsened septic arthritis  - S/p Amputation of right third toe on 12/4  - Vascular follow up as outpatient with Dr. Espinosa  - Baseline Hgb ~10, Monitor CBC, Transfuse if <7, Keep active Type and Screen  - Patient has PICC line from previous admission, can be removed prior to discharge  - S/p podiatry procedure 12/11  - Per ID, continue Levaquin 750mg IV Daily and Meropenem 1g IV q8H, can be discharged on PO Levaquin/Flagyl  - Podiatry, s/p washout and closure yesterday 12/17    #UTI  - Increased frequency, positive dysuria  - UA negative    #Insulin-dependent T2DM  - Monitor fingersticks with meals and at bedtime  - Continue Lantus 30 qHS, Lispro 15 TID AC, Sliding scale x1    #Dyspnea with fatigue, loss of appetite x3 days - resolved  - Characterizes dyspnea on exertion. Denies orthopnea or PND. No prior history of CHF  - TTE: EF 55-60%, Grade 1 diastolic dysfunction, borderline pulmonary hypertension  - CXR negative     #TYLER -resolved  - Baseline Cr 0.9-1.1     #Hypertension -well controlled  - Takes Benazepril 40mg Daily at home  - Continue Lisinopril 40mg Daily  - Monitor BP    #Hyperlipidemia  - Continue Atorvastatin 40mg qHS    #GERD  - Continue Protonix    #Depression  - Was taking Sertraline 50mg Daily. Per patient and pharmacy, has not been taking medication since August  - Follow-up outpatient to resume meds  - Monitor for any SI/HI while inpatient    #Pending: Discharge planning  #Disposition: From home, discharge to [facility]  #Diet: DASH/TLC  #GI Prophylaxis: Protonix 40mg PO Daily  #DVT Prophylaxis: Lovenox 40mg SubQ qHS  #Activity: Ambulate as tolerated, NWB RLANGLE  #Code Status: Full Code

## 2019-12-18 NOTE — PROGRESS NOTE ADULT - SUBJECTIVE AND OBJECTIVE BOX
PROGRESS NOTE   Pt seen @ bedside during AM rounds with attending present .  Dressing +Clean, +Dry, +intact      Vital Signs Last 24 Hrs  T(C): 37.2 (18 Dec 2019 05:08), Max: 37.2 (18 Dec 2019 05:08)  T(F): 98.9 (18 Dec 2019 05:08), Max: 98.9 (18 Dec 2019 05:08)  HR: 83 (18 Dec 2019 05:08) (79 - 91)  BP: 122/61 (18 Dec 2019 05:08) (109/56 - 156/72)  BP(mean): --  RR: 18 (18 Dec 2019 05:08) (12 - 20)  SpO2: 100% (18 Dec 2019 07:21) (97% - 100%)                          9.4    7.71  )-----------( 422      ( 18 Dec 2019 06:16 )             30.3               12-18    141  |  103  |  18  ----------------------------<  325<H>  5.4<H>   |  25  |  1.0    Ca    9.1      18 Dec 2019 06:16  Mg     1.9     12-18    TPro  6.9  /  Alb  3.2<L>  /  TBili  0.4  /  DBili  x   /  AST  14  /  ALT  13  /  AlkPhos  230<H>  12-18      PHYSICAL EXAM  Right LE Focused:  DERM:   Surgical site R foot - Sutures Intact and wound edges are well coapted w/o signs of Infection Along Suture Site and No wound Dehiscences   No signs of drainage. Mild post op edema  Superficial Ulcer Dorsal Left Foot - Stable     Assessment:  S/p TMA, right foot. (DOS: 12/11/2019)  s/p Dbx surgical site R foot, closed 12/17/19  Sutures Site Stable; No Clinical Signs of Infection     Plan:  Patient seen and evaluated. All questions and concerns were addressed and answered   Stable for D/C per Podiatry with the following recommendations:   Non-Weight Bearing Right Foot   Wound care: Betadine/DSD/Kerlix distal surgical site and xeroform/DSD/Kerlix dorsal and plantar wounds - Daily   F/u with Dr. Bhatia in office 1 week after D/C  Abx as per ID  Attending Updated and Aware PROGRESS NOTE   Pt seen @ bedside during AM rounds with attending present .  Dressing +Clean, +Dry, +intact      Vital Signs Last 24 Hrs  T(C): 37.2 (18 Dec 2019 05:08), Max: 37.2 (18 Dec 2019 05:08)  T(F): 98.9 (18 Dec 2019 05:08), Max: 98.9 (18 Dec 2019 05:08)  HR: 83 (18 Dec 2019 05:08) (79 - 91)  BP: 122/61 (18 Dec 2019 05:08) (109/56 - 156/72)  BP(mean): --  RR: 18 (18 Dec 2019 05:08) (12 - 20)  SpO2: 100% (18 Dec 2019 07:21) (97% - 100%)                          9.4    7.71  )-----------( 422      ( 18 Dec 2019 06:16 )             30.3               12-18    141  |  103  |  18  ----------------------------<  325<H>  5.4<H>   |  25  |  1.0    Ca    9.1      18 Dec 2019 06:16  Mg     1.9     12-18    TPro  6.9  /  Alb  3.2<L>  /  TBili  0.4  /  DBili  x   /  AST  14  /  ALT  13  /  AlkPhos  230<H>  12-18      PHYSICAL EXAM  Right LE Focused:  DERM:   Surgical site R foot - Sutures Intact and wound edges are well coapted w/o signs of Infection Along Suture Site and No wound Dehiscences   No signs of drainage. Mild post op edema  Superficial Ulcer Dorsal Left Foot - Stable     Assessment:  S/p TMA, right foot. (DOS: 12/11/2019)  s/p Dbx surgical site R foot, closed 12/17/19  Sutures Site Stable; No Clinical Signs of Infection     Plan:  Patient seen and evaluated. All questions and concerns were addressed and answered   Stable for D/C per Podiatry with the following recommendations:   Non-Weight Bearing Right Foot   Wound care: Betadine/DSD/Kerlix distal surgical site and xeroform/DSD/Kerlix on plantar side - Daily   F/u with Dr. Bhatia in office 1 week after D/C  Abx as per ID  Attending Updated and Aware

## 2019-12-18 NOTE — PROGRESS NOTE ADULT - ATTENDING COMMENTS
A user error has taken place: encounter opened in error, closed for administrative reasons.  
69 year-old male with PMH of insulin-dependent DM c/b diabetic foot ulcers s/p amputations of half of left great toe and entire right great toe, PVD, HTN, HLD, GERD, and depression who p/w worsening of a previously diagnosed R third toe wound found to have worsening gangrene requiring amputation of R third toe.    # Right third toe gangrene/ OM/ septic arthritis in setting of PVD in RLE  # 2nd MTP septic arthritis   - s/p right third toe ray amputation 12/4/2019 - f/u OR cultures   - Afebrile on admission. TMax in .0 F. + Leukocytosis (13.2) with neutrophilic predominance. Tylenol PRN.  SEPSIS RULED OUT ON ADMISSION  - ESR elevated at 70. CRP pending  - blood cultures negative x 2 ,   -repeated blood cultures on 12/4 since patient spiked a fever post op.    - R foot XR : gangrene R 3rd toe and recurrent OM, worsening R 3rd  MTP septic arthritis and OM; R 2nd MTP worsened septic arthritis   Podiatry now planning Right 2nd, 4th, 5th ray amputation in the coming week.     - Vascular surgery (Dr. Espinosa) consult f/u:  discharged with follow-up scheduled with Dr. Espinosa after angiogram revealed moderate R tibial disease  - Antibiotics per ID. Had PICC line placed prior to discharge on last admission (discharged 11/21) c/w  IV Rocephin 2 g qD and IV Flagyl 500 mg qD    # TYLER  - Baseline Cr 0.9-1.1 , Cr 1.4 today  - gentle hydration     # Dyspnea with fatigue, loss of appetite x3 days - resolved  - Characterizes dyspnea on exertion. Denies orthopnea or PND. No prior history of CHF  - TTE: G1DD 55-60% EF, borderline pulmonary hypertension  - CXR negative   - Hgb 9.3 (baseline ~10.0).  Transfuse <7.  Ative T+S    # Insulin-dependent T2DM  - Monitor fingersticks with meals and at bedtime  - lispro, lantus    # Hypertension - well controlled  - Takes Benazepril 40 mg qD at home  - C/w Lisinopril 40 mg qD for now  - Monitor BP    # Hyperlipidemia  - C/w Atorvastatin 40 mg qHS for now    # GERD  - C/w PO Protonix 40 mg qD for now    # Depression  - Was taking Sertraline 50 mg qD. Per patient and pharmacy, has not been taking medication since August  - Follow-up outpatient for resumption of medication  - Monitor for any SI/HI while inpatient    #DVT ppx: SCDs   #GI ppx: Protonix  #Diet: dash/tlc    FULL CODE    Dispo: acute
EKG and labs reviewed. Mg repleted. METs > 4 rcri 1, moderate risk for intermitediate risk surgery (right toe amputation
increase gangrenous changes to the second toe  needs further surgery-->OR next week for  2nd, 4th, 5th ray Amp,
light dehiscence at the medial apex of the amputation stump  OR tomorrow for wash out and debridement
pt scheduled for OR today with right 3rd ray resection  right 2nd toe, lateral aspect has early gangrenous changes   Explained to the patient I am pessimistic about viability of the 2nd digit  I again discussed a transmetatarsal amputation-->pt will consider
will apply wound vac once available  progressive gangrene to 2nd toe
will continue to follow patient  persistent output into drain  skin well re-approximated
69 year-old male with PMH of insulin-dependent DM c/b diabetic foot ulcers s/p amputations of half of left great toe and entire right great toe, PVD, HTN, HLD, GERD, and depression who p/w worsening of a previously diagnosed R third toe wound found to have worsening gangrene requiring amputation of R third toe.    # Right third toe gangrene/ OM/ septic arthritis in setting of PVD in RLE  # 2nd MTP septic arthritis   SEPSIS RULED OUT ON ADMISSION  - s/p right third toe ray amputation 12/4/2019 - OR cultures showing multiple bacteria. On IV Abx per ID.   Blood cultures hitherto negative.  - R foot XR : gangrene R 3rd toe and recurrent OM, worsening R 3rd  MTP septic arthritis and OM; R 2nd MTP worsened septic arthritis   Podiatry now planning Right 2nd, 4th, 5th ray amputation 12/10  Podiatry Requests Vascular consult as well  Last time Vascular (Dr. Espinosa) saw him in Nov. for a RLE angiogram that revealed moderate R tibial disease.      # TYLER  - resolved  D/jenny IVfs     # Insulin-dependent T2DM  - Monitor fingersticks with meals and at bedtime  - lispro, lantus    # Hypertension - well controlled  - Takes Benazepril 40 mg qD at home  - C/w Lisinopril 40 mg qD for now  - Monitor BP    # Hyperlipidemia  - C/w Atorvastatin 40 mg qHS for now    # GERD  - C/w PO Protonix 40 mg qD for now    # Depression  - Was taking Sertraline 50 mg qD. Per patient and pharmacy, has not been taking medication since August  - Follow-up outpatient for resumption of medication  - Monitor for any SI/HI while inpatient    #DVT ppx: SCDs   #GI ppx: Protonix  #Diet: dash/tlc    FULL CODE    Dispo: acute .
69 year-old male with PMH of insulin-dependent DM c/b diabetic foot ulcers s/p amputations of half of left great toe and entire right great toe, PVD, HTN, HLD, GERD, and depression who p/w worsening of a previously diagnosed R third toe wound found to have worsening gangrene requiring amputation of R third toe.    # Right third toe gangrene/ OM/ septic arthritis in setting of PVD in RLE  # 2nd MTP septic arthritis   SEPSIS RULED OUT ON ADMISSION  - s/p right third toe ray amputation 12/4/2019 - OR cultures showing multiple bacteria. On IV Abx per ID.   Blood cultures hitherto negative.  - R foot XR : gangrene R 3rd toe and recurrent OM, worsening R 3rd  MTP septic arthritis and OM; R 2nd MTP worsened septic arthritis   Podiatry now planning Right 2nd, 4th, 5th ray amputation 12/10  Podiatry Requests Vascular consult as well  Last time Vascular (Dr. Espinosa) saw him in Nov. for a RLE angiogram that revealed moderate R tibial disease.      # TYLER  - resolved  D/jenny IVfs     # Insulin-dependent T2DM  - Monitor fingersticks with meals and at bedtime  - lispro, lantus    # Hypertension - well controlled  - Takes Benazepril 40 mg qD at home  - C/w Lisinopril 40 mg qD for now  - Monitor BP    # Hyperlipidemia  - C/w Atorvastatin 40 mg qHS for now    # GERD  - C/w PO Protonix 40 mg qD for now    # Depression  - Was taking Sertraline 50 mg qD. Per patient and pharmacy, has not been taking medication since August  - Follow-up outpatient for resumption of medication  - Monitor for any SI/HI while inpatient    #DVT ppx: SCDs   #GI ppx: Protonix  #Diet: dash/tlc. NPO after midnight for sx tomorrow.    FULL CODE    Dispo: acute .
69 year-old male with PMH of insulin-dependent DM c/b diabetic foot ulcers s/p amputations of half of left great toe and entire right great toe, PVD, HTN, HLD, GERD, and depression who p/w worsening of a previously diagnosed R third toe wound found to have worsening gangrene requiring amputation of R third toe.    # Right third toe gangrene/ OM/ septic arthritis in setting of PVD in RLE  # 2nd MTP septic arthritis   SEPSIS RULED OUT ON ADMISSION  - s/p right third toe ray amputation 12/4/2019 - OR cultures showing multiple bacteria. On IV Abx per ID.   Blood cultures hitherto negative.  - R foot XR : gangrene R 3rd toe and recurrent OM, worsening R 3rd  MTP septic arthritis and OM; R 2nd MTP worsened septic arthritis   Podiatry now planning Right 2nd, 4th, 5th ray amputation 12/10 - surgery deferred for evening since patient threw up in morning when they tried sedating him.    Podiatry Requests Vascular consult as well  Last time Vascular (Dr. Espinosa) saw him in Nov. for a RLE angiogram that revealed moderate R tibial disease.      # TYLER  - resolved  D/jenny IVfs     # Insulin-dependent T2DM  - Monitor fingersticks with meals and at bedtime  - lispro, lantus    # Hypertension - well controlled  - Takes Benazepril 40 mg qD at home  - C/w Lisinopril 40 mg qD for now  - Monitor BP    # Hyperlipidemia  - C/w Atorvastatin 40 mg qHS for now    # GERD  - C/w PO Protonix 40 mg qD for now    # Depression  - Was taking Sertraline 50 mg qD. Per patient and pharmacy, has not been taking medication since August  - Follow-up outpatient for resumption of medication  - Monitor for any SI/HI while inpatient    #DVT ppx: SCDs   #GI ppx: Protonix  #Diet: dash/tlc.     FULL CODE    Dispo: acute .
I saw and evaluated patient  by bedside, npo for right foot gangrenous toes amputation , remains afebrile. c/o right leg edema.    All labs, radiology studies, VS was reviewed  I have reviewed the resident's note and agree with documented findings and  plan of care.  #Right foot toe gangrene, OM, septic arthritis- patient is for surgical amputation of affected toes, continue IV abx. tx.   -pain management  -patient has a picc line.  # DM 2- continue insulin tx. with bsfs monitoring  #HTN - continue current meds.  # h/o GERD, Depression, Dyslipidemia-  stable, resumed on home regimen tx.   # daily dvt proph.     #Progress Note Handoff: f/up patient post surgical debridement today, post op pain management, IV abx. tx.   Family discussion: medical plan of tx. was d/w pt. by bedside Disposition: Home__vs STR
Persistent  serous fluid oozing from the medial amputation stump  minimal drainage into drain  concerning from dehiscence-->will tentatively book for tuesday for washout, possible debridement  will continue to monitor over the weekend
pt seen and examined independently  doing well, no complaints.     #Right foot toe gangrene, OM, septic arthritis- patient is s/p surgical amputation of 2nd,4th, 5th toes, continue IV abx. tx.- Meropenem,levaquin via PICC.  pain mgmt.  washout of right foot post op area Tomorrow  #Dysuria - UA neg, on abx  # DM 2- continue insulin tx. with bsfs monitoring  #HTN - continue current meds.  # h/o GERD, Depression, Dyslipidemia-  stable, resumed on home regimen tx.   # PPx - lovenox    Progress Note Handoff  Pending:  washout on Tuesday  Patient/Family discussion: discussed plan w/ pt  Disposition: STR likely wednesday
69 year-old male with PMH of insulin-dependent DM c/b diabetic foot ulcers s/p amputations of half of left great toe and entire right great toe, PVD, HTN, HLD, GERD, and depression who p/w worsening of a previously diagnosed R third toe wound found to have worsening gangrene requiring amputation of R third toe.    # Right third toe gangrene/ OM/ septic arthritis in setting of PVD in RLE  # 2nd MTP septic arthritis   - s/p right third toe ray amputation 12/4/2019 - f/u OR cultures   - Afebrile on admission. TMax in .0 F. + Leukocytosis (13.2) with neutrophilic predominance. Tylenol PRN.  SEPSIS RULED OUT ON ADMISSION  - ESR elevated at 70. CRP pending  - blood cultures negative x 2 ,   -repeated blood cultures on 12/4 since patient spiked a fever post op.    - R foot XR : gangrene R 3rd toe and recurrent OM, worsening R 3rd  MTP septic arthritis and OM; R 2nd MTP worsened septic arthritis   Podiatry now planning Right 2nd, 4th, 5th ray amputation in the coming week.     - Vascular surgery (Dr. Espinosa) consult f/u:  discharged with follow-up scheduled with Dr. Espinosa after angiogram revealed moderate R tibial disease  - Antibiotics per ID. Had PICC line placed prior to discharge on last admission (discharged 11/21) c/w  IV Rocephin 2 g qD and IV Flagyl 500 mg qD    # TYLER  - resolving.   D/c IVfs tomorrow    # Dyspnea with fatigue, loss of appetite x3 days - resolved  - Characterizes dyspnea on exertion. Denies orthopnea or PND. No prior history of CHF  - TTE: G1DD 55-60% EF, borderline pulmonary hypertension  - CXR negative   - Hgb 9.3 (baseline ~10.0).  Transfuse <7.  Ative T+S    # Insulin-dependent T2DM  - Monitor fingersticks with meals and at bedtime  - lispro, lantus    # Hypertension - well controlled  - Takes Benazepril 40 mg qD at home  - C/w Lisinopril 40 mg qD for now  - Monitor BP    # Hyperlipidemia  - C/w Atorvastatin 40 mg qHS for now    # GERD  - C/w PO Protonix 40 mg qD for now    # Depression  - Was taking Sertraline 50 mg qD. Per patient and pharmacy, has not been taking medication since August  - Follow-up outpatient for resumption of medication  - Monitor for any SI/HI while inpatient    #DVT ppx: SCDs   #GI ppx: Protonix  #Diet: dash/tlc    FULL CODE    Dispo: acute .
I saw and evaluated patient  by bedside, no dysuria today,  remains afebrile. right leg edema has resolved.  All labs, radiology studies, VS was reviewed  I have reviewed the resident's note and agree with documented findings and  plan of care.  #Right foot toe gangrene, OM, septic arthritis- patient is s/p surgical amputation of 2nd,4th, 5th toes, continue IV abx. tx.   -pain management  -patient has a picc line.  #Dysuria- obtain u/a, urine cxs.  # DM 2- continue insulin tx. with bsfs monitoring  #HTN - continue current meds.  # h/o GERD, Depression, Dyslipidemia-  stable, resumed on home regimen tx.   # daily dvt proph.     #Progress Note Handoff: post op care as per surgical specialist, pain management, f/up with ID for abx. choice and duration  Family discussion: medical plan of tx. was d/w pt. by bedside Disposition: Home__vs STR  in 24 hours.
I saw and evaluated patient  by bedside, no dysuria today,  remains afebrile. right leg edema has resolved.  All labs, radiology studies, VS was reviewed  I have reviewed the resident's note and agree with documented findings and  plan of care.  #Right foot toe gangrene, OM, septic arthritis- patient is s/p surgical amputation of 2nd,4th, 5th toes, continue IV abx. tx.   -pain management  -patient has a picc line.  -as per Podiatry surgery recommendations, there is planned another washout of right foot post op area on tuesday.  #Dysuria- obtain u/a, urine cxs.  # DM 2- continue insulin tx. with bsfs monitoring  #HTN - continue current meds.  # h/o GERD, Depression, Dyslipidemia-  stable, resumed on home regimen tx.   # daily dvt proph.     #Progress Note Handoff: patient planned for 2nd washout post op on tuesday. continue IV abx. tx.  Family discussion: medical plan of tx. was d/w pt. by bedside Disposition: STR possibly on wednesday.
69M w/    #Right foot toe gangrene, OM, septic arthritis- patient is s/p surgical amputation of 2nd,4th, 5th toes, continue IV abx. tx.- Meropenem,levaquin via PICC.  pain mgmt.  washout of right foot post op area 12/17. f/u podiatry. possible discharge to Home w/ [facility] tomorrow.  #Dysuria - UA neg, on abx  # DM 2- continue insulin tx, well controlled  #HTN - continue current meds, BP ok  # h/o GERD, Depression, Dyslipidemia-  stable, resumed on home regimen tx.   # PPx - lovenox    Dispo: anticipate d/c to home w/ [facility] tomorrow.
I saw and evaluated patient  by bedside, c/o burning sensation during urination,  remains afebrile. c/o right foot mild pain.  All labs, radiology studies, VS was reviewed  I have reviewed the resident's note and agree with documented findings and  plan of care.  #Right foot toe gangrene, OM, septic arthritis- patient is s/p surgical amputation of 2nd,4th, 5th toes, continue IV abx. tx.   -pain management  -patient has a picc line.  #Dysuria- obtain u/a, urine cxs.  # DM 2- continue insulin tx. with bsfs monitoring  #HTN - continue current meds.  # h/o GERD, Depression, Dyslipidemia-  stable, resumed on home regimen tx.   # daily dvt proph.     #Progress Note Handoff: post op care as per surgical specialist, pain management, u/a , urine cxs to follow.  Family discussion: medical plan of tx. was d/w pt. by bedside Disposition: Home__vs STR  in 48 hours.

## 2019-12-18 NOTE — PROGRESS NOTE ADULT - SUBJECTIVE AND OBJECTIVE BOX
Patient Information:  JOSÉ MANUEL PORTER / 69y / Male / MRN#:[id number]    Hospital Day: 15d    HPI:  69 year-old male with PMH of insulin-dependent DM c/b diabetic foot ulcers s/p amputations of half of left great toe and entire right great toe, PVD, HTN, HLD, GERD, and depression who presents with worsening of a previously diagnosed R third toe wound. Patient was recently admitted to [facility] from 11/14-11/21 for worsening of the same diabetic foot ulcer of right foot with OM with cultures growing MSSA; patient had PICC line placed prior to discharge and was receiving PO Flagyl 500 mg BID and IV Rocephin 2 g qD with scheduled ID (Dr. Gary Turpin) follow-up.    Today, patient presents with mild pain and numbness in right third toe. Per patient and wife at bedside, he had appointment with Vascular Surgery scheduled for today but decided to come to the ED due to worsening appearance of toe (was starting to appear darker) in addition to feeling short of breath, fatigue, and loss of appetite for past three days. + Pain in R foot, + loss of appetite, + fatigue, + shortness of breath x3 days. Denies fever/chills/HA/change in vision/rhinorrhea/sore throat/cough/chest pain/abdominal pain/constipation/diarrhea.    Interval History:  Patient seen and examined at bedside. No acute events overnight. Went for washout and closure with podiatry yesterday. Pending podiatry recommendations for discharge planning.    Past Medical History:  GERD (gastroesophageal reflux disease)  Depression  Diabetic foot ulcer  Dyslipidemia  HTN (hypertension)  DM (diabetes mellitus)    Past Surgical History:  Toe amputation status, right    Allergies:  No Known Allergies    Medications:  PRN:    Standing:  aspirin enteric coated 81 milliGRAM(s) Oral daily  atorvastatin 40 milliGRAM(s) Oral at bedtime  enoxaparin Injectable 40 milliGRAM(s) SubCutaneous at bedtime  insulin glargine Injectable (LANTUS) 30 Unit(s) SubCutaneous at bedtime  insulin lispro (HumaLOG) corrective regimen sliding scale   SubCutaneous three times a day before meals  insulin lispro Injectable (HumaLOG) 15 Unit(s) SubCutaneous three times a day before meals  lactulose Syrup 20 Gram(s) Oral daily  levoFLOXacin IVPB 750 milliGRAM(s) IV Intermittent every 24 hours  lisinopril 40 milliGRAM(s) Oral daily  meropenem  IVPB 1000 milliGRAM(s) IV Intermittent every 8 hours  NIFEdipine XL 60 milliGRAM(s) Oral daily  pantoprazole    Tablet 40 milliGRAM(s) Oral before breakfast  polyethylene glycol 3350 17 Gram(s) Oral daily  senna 2 Tablet(s) Oral at bedtime    Home:  benazepril 40 mg oral tablet: 1 tab(s) orally once a day  Crestor 10 mg oral tablet: 1 tab(s) orally once a day (at bedtime)  HumuLIN 70/30 KwikPen 70 units-30 units/mL subcutaneous suspension: 50 unit(s) subcutaneous once a day before breakfast  HumuLIN 70/30 subcutaneous suspension: 40 unit(s) subcutaneous once a day (at bedtime)  Janumet 50 mg-1000 mg oral tablet: 1 tab(s) orally 2 times a day  omeprazole 20 mg oral delayed release capsule: 1 cap(s) orally once a day    Vitals:  T(C): 37.2, Max: 37.2 (12-18-19 @ 05:08)  T(F): 98.9, Max: 98.9 (12-18-19 @ 05:08)  HR: 83 (79 - 91)  BP: 122/61 (109/56 - 156/72)  RR: 18 (12 - 20)  SpO2: 100% (97% - 100%)    Physical Exam:  General: Awake, Alert. Not in acute distress.  Heart: Regular rate and rhythm; S1, S2; No murmurs.  Lungs: Clear to auscultation bilaterally.  Abdomen: Soft, nontender, nondistended.  Extremities: Right foot wrapped  Neuro: AAOx3, No focal deficits.    Labs:  CBC (12-18 @ 06:16)                        Hgb: 9.4    WBC: 7.71  )-----------------( Plts: 422                              Hct: 30.3     Chem (12-18 @ 06:16)  Na: 141  |     Cl: 103     |  BUN: 18  -----------------------------------------< Gluc: 325    K: 5.4   |    HCO3: 25  |  Cr: 1.0    Ca 9.1 (12-18 @ 06:16)  Mg 1.9 (12-18 @ 06:16)    LFTs (12-18 @ 06:16)  TPro 6.9  /  Alb 3.2  TBili 0.4  /  DBili     AST 14  /  ALT 13  /  AlkPhos 230    PT/INR (12-17 @ 07:02)  PT: 14.80; INR: 1.29   PTT: 40.5 Patient Information:  JOSÉ MANUEL PORTER / 69y / Male / MRN#:[id number]    Hospital Day: 15d    HPI:  69 year-old male with PMH of insulin-dependent DM c/b diabetic foot ulcers s/p amputations of half of left great toe and entire right great toe, PVD, HTN, HLD, GERD, and depression who presents with worsening of a previously diagnosed R third toe wound. Patient was recently admitted to [facility] from 11/14-11/21 for worsening of the same diabetic foot ulcer of right foot with OM with cultures growing MSSA; patient had PICC line placed prior to discharge and was receiving PO Flagyl 500 mg BID and IV Rocephin 2 g qD with scheduled ID (Dr. Gary Turpin) follow-up.    Today, patient presents with mild pain and numbness in right third toe. Per patient and wife at bedside, he had appointment with Vascular Surgery scheduled for today but decided to come to the ED due to worsening appearance of toe (was starting to appear darker) in addition to feeling short of breath, fatigue, and loss of appetite for past three days. + Pain in R foot, + loss of appetite, + fatigue, + shortness of breath x3 days. Denies fever/chills/HA/change in vision/rhinorrhea/sore throat/cough/chest pain/abdominal pain/constipation/diarrhea.    Interval History:  Patient seen and examined at bedside. No acute events overnight. Went for washout and closure with podiatry yesterday. Per Podiatry, cleared for discharge.    Past Medical History:  GERD (gastroesophageal reflux disease)  Depression  Diabetic foot ulcer  Dyslipidemia  HTN (hypertension)  DM (diabetes mellitus)    Past Surgical History:  Toe amputation status, right    Allergies:  No Known Allergies    Medications:  PRN:    Standing:  aspirin enteric coated 81 milliGRAM(s) Oral daily  atorvastatin 40 milliGRAM(s) Oral at bedtime  enoxaparin Injectable 40 milliGRAM(s) SubCutaneous at bedtime  insulin glargine Injectable (LANTUS) 30 Unit(s) SubCutaneous at bedtime  insulin lispro (HumaLOG) corrective regimen sliding scale   SubCutaneous three times a day before meals  insulin lispro Injectable (HumaLOG) 15 Unit(s) SubCutaneous three times a day before meals  lactulose Syrup 20 Gram(s) Oral daily  levoFLOXacin IVPB 750 milliGRAM(s) IV Intermittent every 24 hours  lisinopril 40 milliGRAM(s) Oral daily  meropenem  IVPB 1000 milliGRAM(s) IV Intermittent every 8 hours  NIFEdipine XL 60 milliGRAM(s) Oral daily  pantoprazole    Tablet 40 milliGRAM(s) Oral before breakfast  polyethylene glycol 3350 17 Gram(s) Oral daily  senna 2 Tablet(s) Oral at bedtime    Home:  benazepril 40 mg oral tablet: 1 tab(s) orally once a day  Crestor 10 mg oral tablet: 1 tab(s) orally once a day (at bedtime)  HumuLIN 70/30 KwikPen 70 units-30 units/mL subcutaneous suspension: 50 unit(s) subcutaneous once a day before breakfast  HumuLIN 70/30 subcutaneous suspension: 40 unit(s) subcutaneous once a day (at bedtime)  Janumet 50 mg-1000 mg oral tablet: 1 tab(s) orally 2 times a day  omeprazole 20 mg oral delayed release capsule: 1 cap(s) orally once a day    Vitals:  T(C): 37.2, Max: 37.2 (12-18-19 @ 05:08)  T(F): 98.9, Max: 98.9 (12-18-19 @ 05:08)  HR: 83 (79 - 91)  BP: 122/61 (109/56 - 156/72)  RR: 18 (12 - 20)  SpO2: 100% (97% - 100%)    Physical Exam:  General: Awake, Alert. Not in acute distress.  Heart: Regular rate and rhythm; S1, S2; No murmurs.  Lungs: Clear to auscultation bilaterally.  Abdomen: Soft, nontender, nondistended.  Extremities: Right foot wrapped  Neuro: AAOx3, No focal deficits.    Labs:  CBC (12-18 @ 06:16)                        Hgb: 9.4    WBC: 7.71  )-----------------( Plts: 422                              Hct: 30.3     Chem (12-18 @ 06:16)  Na: 141  |     Cl: 103     |  BUN: 18  -----------------------------------------< Gluc: 325    K: 5.4   |    HCO3: 25  |  Cr: 1.0    Ca 9.1 (12-18 @ 06:16)  Mg 1.9 (12-18 @ 06:16)    LFTs (12-18 @ 06:16)  TPro 6.9  /  Alb 3.2  TBili 0.4  /  DBili     AST 14  /  ALT 13  /  AlkPhos 230    PT/INR (12-17 @ 07:02)  PT: 14.80; INR: 1.29   PTT: 40.5

## 2019-12-18 NOTE — ANESTHESIA FOLLOW-UP NOTE - NSEVALATION_GEN_ALL_CORE
No apparent complications or complaints regarding anesthesia care at this time
No apparent complications or complaints regarding anesthesia care at this time/All questions were answered
No apparent complications or complaints regarding anesthesia care at this time/All questions were answered

## 2019-12-19 VITALS
SYSTOLIC BLOOD PRESSURE: 122 MMHG | HEART RATE: 88 BPM | RESPIRATION RATE: 18 BRPM | TEMPERATURE: 98 F | DIASTOLIC BLOOD PRESSURE: 62 MMHG

## 2019-12-19 LAB
ANION GAP SERPL CALC-SCNC: 13 MMOL/L — SIGNIFICANT CHANGE UP (ref 7–14)
BUN SERPL-MCNC: 18 MG/DL — SIGNIFICANT CHANGE UP (ref 10–20)
CALCIUM SERPL-MCNC: 9 MG/DL — SIGNIFICANT CHANGE UP (ref 8.5–10.1)
CHLORIDE SERPL-SCNC: 102 MMOL/L — SIGNIFICANT CHANGE UP (ref 98–110)
CO2 SERPL-SCNC: 27 MMOL/L — SIGNIFICANT CHANGE UP (ref 17–32)
CREAT SERPL-MCNC: 0.9 MG/DL — SIGNIFICANT CHANGE UP (ref 0.7–1.5)
GLUCOSE BLDC GLUCOMTR-MCNC: 305 MG/DL — HIGH (ref 70–99)
GLUCOSE BLDC GLUCOMTR-MCNC: 94 MG/DL — SIGNIFICANT CHANGE UP (ref 70–99)
GLUCOSE SERPL-MCNC: 90 MG/DL — SIGNIFICANT CHANGE UP (ref 70–99)
POTASSIUM SERPL-MCNC: 4.4 MMOL/L — SIGNIFICANT CHANGE UP (ref 3.5–5)
POTASSIUM SERPL-SCNC: 4.4 MMOL/L — SIGNIFICANT CHANGE UP (ref 3.5–5)
SODIUM SERPL-SCNC: 142 MMOL/L — SIGNIFICANT CHANGE UP (ref 135–146)

## 2019-12-19 PROCEDURE — 99239 HOSP IP/OBS DSCHRG MGMT >30: CPT

## 2019-12-19 RX ORDER — INSULIN NPH HUM/REG INSULIN HM 70-30/ML
40 VIAL (ML) SUBCUTANEOUS
Qty: 0 | Refills: 0 | DISCHARGE

## 2019-12-19 RX ORDER — INSULIN NPH HUM/REG INSULIN HM 70-30/ML
50 VIAL (ML) SUBCUTANEOUS
Qty: 0 | Refills: 0 | DISCHARGE

## 2019-12-19 RX ORDER — METRONIDAZOLE 500 MG
500 TABLET ORAL
Refills: 0 | Status: DISCONTINUED | OUTPATIENT
Start: 2019-12-19 | End: 2019-12-19

## 2019-12-19 RX ORDER — INSULIN GLARGINE 100 [IU]/ML
30 INJECTION, SOLUTION SUBCUTANEOUS
Qty: 0 | Refills: 0 | DISCHARGE
Start: 2019-12-19

## 2019-12-19 RX ORDER — METRONIDAZOLE 500 MG
1 TABLET ORAL
Qty: 0 | Refills: 0 | DISCHARGE
Start: 2019-12-19

## 2019-12-19 RX ORDER — CIPROFLOXACIN LACTATE 400MG/40ML
1 VIAL (ML) INTRAVENOUS
Qty: 0 | Refills: 0 | DISCHARGE
Start: 2019-12-19

## 2019-12-19 RX ORDER — INSULIN LISPRO 100/ML
7 VIAL (ML) SUBCUTANEOUS ONCE
Refills: 0 | Status: COMPLETED | OUTPATIENT
Start: 2019-12-19 | End: 2019-12-19

## 2019-12-19 RX ORDER — INSULIN LISPRO 100/ML
15 VIAL (ML) SUBCUTANEOUS
Qty: 450 | Refills: 0
Start: 2019-12-19

## 2019-12-19 RX ORDER — SENNA PLUS 8.6 MG/1
2 TABLET ORAL
Qty: 0 | Refills: 0 | DISCHARGE
Start: 2019-12-19

## 2019-12-19 RX ORDER — POLYETHYLENE GLYCOL 3350 17 G/17G
17 POWDER, FOR SOLUTION ORAL
Qty: 0 | Refills: 0 | DISCHARGE
Start: 2019-12-19

## 2019-12-19 RX ADMIN — LACTULOSE 20 GRAM(S): 10 SOLUTION ORAL at 12:20

## 2019-12-19 RX ADMIN — Medication 4: at 12:18

## 2019-12-19 RX ADMIN — LISINOPRIL 40 MILLIGRAM(S): 2.5 TABLET ORAL at 05:35

## 2019-12-19 RX ADMIN — POLYETHYLENE GLYCOL 3350 17 GRAM(S): 17 POWDER, FOR SOLUTION ORAL at 12:20

## 2019-12-19 RX ADMIN — Medication 81 MILLIGRAM(S): at 12:20

## 2019-12-19 RX ADMIN — PANTOPRAZOLE SODIUM 40 MILLIGRAM(S): 20 TABLET, DELAYED RELEASE ORAL at 06:06

## 2019-12-19 RX ADMIN — Medication 7 UNIT(S): at 09:35

## 2019-12-19 RX ADMIN — Medication 15 UNIT(S): at 12:18

## 2019-12-19 RX ADMIN — MEROPENEM 100 MILLIGRAM(S): 1 INJECTION INTRAVENOUS at 05:35

## 2019-12-19 RX ADMIN — Medication 60 MILLIGRAM(S): at 05:35

## 2019-12-19 NOTE — PROGRESS NOTE ADULT - REASON FOR ADMISSION
Worsening Right Diabetic Foot Ulcer

## 2019-12-19 NOTE — PROGRESS NOTE ADULT - NSHPATTENDINGPLANDISCUSS_GEN_ALL_CORE
house staff
resident
Medicine resident
house staff
resident
house staff

## 2019-12-19 NOTE — PROGRESS NOTE ADULT - SUBJECTIVE AND OBJECTIVE BOX
PROGRESS NOTE   Pt seen @ bedside during AM rounds. S/p Dbx of st and washout, right foot (DOS: 12/17/2019). Dressing +Clean, +Dry, +Intact. Pt. denies any recent n/f/v/c/sob. Pt. denies any other pedal complaints at this time.      Vital Signs Last 24 Hrs  T(C): 36.8 (19 Dec 2019 13:37), Max: 36.8 (19 Dec 2019 13:37)  T(F): 98.2 (19 Dec 2019 13:37), Max: 98.2 (19 Dec 2019 13:37)  HR: 88 (19 Dec 2019 13:37) (81 - 95)  BP: 122/62 (19 Dec 2019 13:37) (113/58 - 128/61)  BP(mean): --  RR: 18 (19 Dec 2019 13:37) (17 - 18)  SpO2: --                          9.4    7.71  )-----------( 422      ( 18 Dec 2019 06:16 )             30.3               12-19    142  |  102  |  18  ----------------------------<  90  4.4   |  27  |  0.9    Ca    9.0      19 Dec 2019 06:43  Mg     1.9     12-18    TPro  6.9  /  Alb  3.2<L>  /  TBili  0.4  /  DBili  x   /  AST  14  /  ALT  13  /  AlkPhos  230<H>  12-18      PHYSICAL EXAM  LE Focused:  Derm: Surgical site R foot - Sutures Intact and wound edges are well coapted w/o signs of Infection Along Suture Site and No wound Dehiscences   No signs of drainage. Mild post op edema  Superficial Ulcer Dorsal Left Foot - Stable       Assessment:  S/p Dbx surgical site R foot, closed (DOS:12/17/19)  S/p TMA, right foot. (DOS: 12/11/2019)      Plan:  Pt. seen and evaluated.  Discussed treatment and condition w/ patient.  Stable for D/C per Podiatry with the following recommendations:   Non-Weight Bearing Right Foot   Wound care: Betadine/DSD/Kerlix distal surgical site and xeroform/DSD/Kerlix on plantar side - Daily   F/u with Dr. Bhatia in office 1 week after D/C  Attending aware

## 2019-12-19 NOTE — PROGRESS NOTE ADULT - SUBJECTIVE AND OBJECTIVE BOX
JOSÉ MANUEL PORTER  69y, Male  Allergy: No Known Allergies      CHIEF COMPLAINT: Worsening Right Diabetic Foot Ulcer (18 Dec 2019 09:26)      INTERVAL EVENTS/HPI  - No acute events overnight, s/p OR yesterday  - T(F): , Max: 98.6 (12-18-19 @ 13:49)  - Denies any worsening symptoms  - Tolerating medication  - WBC Count: 7.71 (12-18-19 @ 06:16)  WBC Count: 8.49 (12-17-19 @ 07:02)  - Creatinine, Serum: 0.9 (12-19-19 @ 06:43)  Creatinine, Serum: 1.0 (12-18-19 @ 06:16)       ROS  General: Denies rigors, nightsweats  HEENT: Denies headache, rhinorrhea, sore throat, eye pain  CV: Denies CP, palpitations  PULM: Denies wheezing, hemoptysis  GI: Denies hematemesis, hematochezia, melena  : Denies discharge, hematuria  MSK: Denies arthralgias, myalgias  SKIN: Denies rash, lesions  NEURO: Denies paresthesias, weakness  PSYCH: Denies depression, anxiety    VITALS:  T(F): 96, Max: 98.6 (12-18-19 @ 13:49)  HR: 81  BP: 113/58  RR: 17Vital Signs Last 24 Hrs  T(C): 35.6 (19 Dec 2019 06:03), Max: 37 (18 Dec 2019 13:49)  T(F): 96 (19 Dec 2019 06:03), Max: 98.6 (18 Dec 2019 13:49)  HR: 81 (19 Dec 2019 06:03) (81 - 95)  BP: 113/58 (19 Dec 2019 06:03) (113/58 - 128/61)  BP(mean): --  RR: 17 (19 Dec 2019 06:03) (17 - 18)  SpO2: --    PHYSICAL EXAM:  Gen: NAD, resting in bed  HEENT: Normocephalic, atraumatic  Neck: supple, no lymphadenopathy  CV: Regular rate & regular rhythm  Lungs: decreased BS at bases  Abdomen: Soft, BS present  Ext: Warm, well perfused, R dressings  Neuro: non focal, awake  Skin: no rash, no erythema    FH: Non-contributory  Social Hx: Non-contributory    TESTS & MEASUREMENTS:                        9.4    7.71  )-----------( 422      ( 18 Dec 2019 06:16 )             30.3     12-19    142  |  102  |  18  ----------------------------<  90  4.4   |  27  |  0.9    Ca    9.0      19 Dec 2019 06:43  Mg     1.9     12-18    TPro  6.9  /  Alb  3.2<L>  /  TBili  0.4  /  DBili  x   /  AST  14  /  ALT  13  /  AlkPhos  230<H>  12-18    eGFR if Non African American: 86 mL/min/1.73M2 (12-19-19 @ 06:43)  eGFR if : 99 mL/min/1.73M2 (12-19-19 @ 06:43)    LIVER FUNCTIONS - ( 18 Dec 2019 06:16 )  Alb: 3.2 g/dL / Pro: 6.9 g/dL / ALK PHOS: 230 U/L / ALT: 13 U/L / AST: 14 U/L / GGT: x                     INFECTIOUS DISEASES TESTING      RADIOLOGY & ADDITIONAL TESTS:  I have personally reviewed the last Chest xray  CXR      CT      CARDIOLOGY TESTING  12 Lead ECG:   Ventricular Rate 91 BPM    Atrial Rate 91 BPM    P-R Interval 130 ms    QRS Duration 66 ms    Q-T Interval 344 ms    QTC Calculation(Bezet) 423 ms    P Axis 54 degrees    R Axis -10 degrees    T Axis 71 degrees    Diagnosis Line Normal sinus rhythm  Nonspecific T wave abnormality  Abnormal ECG    Confirmed by Isra Connolly ([id number]) on 12/19/2019 6:22:48 AM (12-18-19 @ 15:26)      MEDICATIONS  aspirin enteric coated 81  atorvastatin 40  enoxaparin Injectable 40  insulin glargine Injectable (LANTUS) 30  insulin lispro (HumaLOG) corrective regimen sliding scale   insulin lispro Injectable (HumaLOG) 15  insulin lispro Injectable (HumaLOG) 7  lactulose Syrup 20  lisinopril 40  NIFEdipine XL 60  pantoprazole    Tablet 40  polyethylene glycol 3350 17  senna 2      ANTIBIOTICS:      All available historical records have been reviewed

## 2019-12-19 NOTE — PROGRESS NOTE ADULT - ASSESSMENT
ASSESSMENT  68 y/o Male with a PMH of DM, Previous DFU's, PVD, HTN, HLD, GERD, Depression. s/p Right Hallux Amputation, recent admission for plantar ulcer after stepping on a screw, d/c with PICC    IMPRESSION  #Gangrenous Right 3rd Digit w/ Chronic Diabetic Pressure Ulcer Plantar Aspect    Pulsed irrigation of wound 17-Dec-2019 19:46:54 Treatment of superficial wound dehiscence by simple closure ,Debridement of fascia "yellow fibrinous tissue"    Closure, wound, delayed 11-Dec-2019 19:04:50 Partial amputation of fifth ray of right foot by open approach 11-Dec-2019 19:04:43 Partial amputation of fourth ray of right foot by open approach 11-Dec-2019 19:04:25 Partial amputation of second ray of right foot by open approach 11-Dec-2019    3rd toe cx   Numerous Stenotrophomonas maltophilia    OR cx Enterobacter (R cefepime, zosyn, S fluoro/bactrim)    12/5 s/p Ray amputation of third toe of right foot   #Previous Admission; Grew MSSA; (PICC 11/14-11/21); Ceftriaxone 2g QD and Flagyl 0.5g BID  #DM    RECOMMENDATIONS  - This is an incomplete pended note, all final recommendations to follow. *** ASSESSMENT  68 y/o Male with a PMH of DM, Previous DFU's, PVD, HTN, HLD, GERD, Depression. s/p Right Hallux Amputation, recent admission for plantar ulcer after stepping on a screw, d/c with PICC    IMPRESSION  #Gangrenous Right 3rd Digit w/ Chronic Diabetic Pressure Ulcer Plantar Aspect    Pulsed irrigation of wound 17-Dec-2019 19:46:54 Treatment of superficial wound dehiscence by simple closure ,Debridement of fascia "yellow fibrinous tissue"    Closure, wound, delayed 11-Dec-2019 19:04:50 Partial amputation of fifth ray of right foot by open approach 11-Dec-2019 19:04:43 Partial amputation of fourth ray of right foot by open approach 11-Dec-2019 19:04:25 Partial amputation of second ray of right foot by open approach 11-Dec-2019    3rd toe cx   Numerous Stenotrophomonas maltophilia    OR cx Enterobacter (R cefepime, zosyn, S fluoro/bactrim)    12/5 s/p Ray amputation of third toe of right foot   #Previous Admission; Grew MSSA; (PICC 11/14-11/21); Ceftriaxone 2g QD and Flagyl 0.5g BID  #DM    RECOMMENDATIONS  -discontinue the PICC  -po levaquin 750mg daily and po flagyl 500mg BID to complete 7 days from OR end 12/24  QTC Calculation(Bezet) 423 ms

## 2019-12-19 NOTE — PROGRESS NOTE ADULT - SUBJECTIVE AND OBJECTIVE BOX
<<<RESIDENT DISCHARGE NOTE>>>     JOSÉ MANUEL PORTER  MRN-[id number]    Vital Signs:  T(C): 36.8, Max: 36.8 (12-19 @ 13:37)  T(F): 98.2, Max: 98.2 (12-19 @ 13:37)  HR: 88 (81 - 95)  BP: 122/62 (113/58 - 128/61)  RR: 18 (17 - 18)  SpO2: --    Physical Exam:  General: Awake, Alert. Not in acute distress.  Heart: Regular rate and rhythm; S1, S2; No murmurs.  Lungs: Clear to auscultation bilaterally.  Abdomen: Soft, nontender, nondistended.  Extremities: Right foot wrapped  Neuro: AAOx3, No focal deficits.    TEST RESULTS:  CBC (12-18 @ 06:16)                        Hgb: 9.4    WBC: 7.71  )-----------------( Plts: 422                              Hct: 30.3     Chem (12-19 @ 06:43)  Na: 142  |     Cl: 102     |  BUN: 18  -----------------------------------------< Gluc: 90    K: 4.4   |    HCO3: 27  |  Cr: 0.9    Ca 9.0 (12-19 @ 06:43)  Mg 1.9 (12-18 @ 06:16)    LFTs (12-18 @ 06:16)  TPro 6.9  /  Alb 3.2  TBili 0.4  /  DBili     AST 14  /  ALT 13  /  AlkPhos 230      FINAL DISCHARGE INTERVIEW:    Resident Present: (Name: Roel Huang MD)   RN Present: (Name: Colleen Dacosta RN)    DISCHARGE MEDICATION RECONCILIATION    Reviewed with Attending (Name: GA Burns)    DISPOSITION:     [X]  SNF/ NH

## 2019-12-20 ENCOUNTER — INBOUND DOCUMENT (OUTPATIENT)
Age: 69
End: 2019-12-20

## 2019-12-20 ENCOUNTER — OUTPATIENT (OUTPATIENT)
Dept: OUTPATIENT SERVICES | Facility: HOSPITAL | Age: 69
LOS: 1 days | Discharge: HOME | End: 2019-12-20

## 2019-12-20 DIAGNOSIS — Z89.421 ACQUIRED ABSENCE OF OTHER RIGHT TOE(S): Chronic | ICD-10-CM

## 2019-12-22 DIAGNOSIS — T81.31XA DISRUPTION OF EXTERNAL OPERATION (SURGICAL) WOUND, NOT ELSEWHERE CLASSIFIED, INITIAL ENCOUNTER: ICD-10-CM
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
## 2019-12-22 DIAGNOSIS — E11.69 TYPE 2 DIABETES MELLITUS WITH OTHER SPECIFIED COMPLICATION: ICD-10-CM

## 2019-12-22 DIAGNOSIS — E83.42 HYPOMAGNESEMIA: ICD-10-CM

## 2019-12-22 DIAGNOSIS — K21.9 GASTRO-ESOPHAGEAL REFLUX DISEASE WITHOUT ESOPHAGITIS: ICD-10-CM

## 2019-12-22 DIAGNOSIS — N17.9 ACUTE KIDNEY FAILURE, UNSPECIFIED: ICD-10-CM

## 2019-12-22 DIAGNOSIS — Z89.421 ACQUIRED ABSENCE OF OTHER RIGHT TOE(S): ICD-10-CM

## 2019-12-22 DIAGNOSIS — B96.89 OTHER SPECIFIED BACTERIAL AGENTS AS THE CAUSE OF DISEASES CLASSIFIED ELSEWHERE: ICD-10-CM

## 2019-12-22 DIAGNOSIS — E11.621 TYPE 2 DIABETES MELLITUS WITH FOOT ULCER: ICD-10-CM

## 2019-12-22 DIAGNOSIS — I10 ESSENTIAL (PRIMARY) HYPERTENSION: ICD-10-CM

## 2019-12-22 DIAGNOSIS — L97.519 NON-PRESSURE CHRONIC ULCER OF OTHER PART OF RIGHT FOOT WITH UNSPECIFIED SEVERITY: ICD-10-CM

## 2019-12-22 DIAGNOSIS — N39.0 URINARY TRACT INFECTION, SITE NOT SPECIFIED: ICD-10-CM

## 2019-12-22 DIAGNOSIS — E11.52 TYPE 2 DIABETES MELLITUS WITH DIABETIC PERIPHERAL ANGIOPATHY WITH GANGRENE: ICD-10-CM

## 2019-12-22 DIAGNOSIS — Z79.899 OTHER LONG TERM (CURRENT) DRUG THERAPY: ICD-10-CM

## 2019-12-22 DIAGNOSIS — E78.5 HYPERLIPIDEMIA, UNSPECIFIED: ICD-10-CM

## 2019-12-22 DIAGNOSIS — Z79.82 LONG TERM (CURRENT) USE OF ASPIRIN: ICD-10-CM

## 2019-12-22 DIAGNOSIS — F32.9 MAJOR DEPRESSIVE DISORDER, SINGLE EPISODE, UNSPECIFIED: ICD-10-CM

## 2019-12-22 DIAGNOSIS — M86.9 OSTEOMYELITIS, UNSPECIFIED: ICD-10-CM

## 2019-12-22 DIAGNOSIS — M00.9 PYOGENIC ARTHRITIS, UNSPECIFIED: ICD-10-CM
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
## 2019-12-22 DIAGNOSIS — Z79.4 LONG TERM (CURRENT) USE OF INSULIN: ICD-10-CM

## 2019-12-26 ENCOUNTER — APPOINTMENT (OUTPATIENT)
Dept: VASCULAR SURGERY | Facility: CLINIC | Age: 69
End: 2019-12-26
Payer: MEDICARE

## 2019-12-26 PROCEDURE — 99213 OFFICE O/P EST LOW 20 MIN: CPT

## 2019-12-26 NOTE — HISTORY OF PRESENT ILLNESS
[FreeTextEntry1] : 68 y/o gentleman with h/o DM, with PVD, nonhealing wounds to left foot underwent left PTA angioplasty on 1/18/19. He  was recently admitted to [facility] for worsening of  right foot wound and gangrene, underwent TMA on 12/17/19. He had a right lower extremity angiogram on 11/20/19  that showed patent right femoral and popliteal arteries and two vessel run-off to the foot, no interventions performed.

## 2019-12-26 NOTE — ASSESSMENT
[FreeTextEntry1] : 68 y/o gentleman with h/o DM, with PVD, nonhealing wounds to left foot underwent left PTA angioplasty on 1/18/19. He  was recently admitted to [facility] for worsening of  right foot wound and gangrene, underwent TMA on 12/17/19. He had a right lower extremity angiogram on 11/20/19  that showed patent right femoral and popliteal arteries and two vessel run-off to the foot, no interventions performed.\par The wounds are healing well. He will f/u with podiatry and see me back in 6 months time.

## 2019-12-30 ENCOUNTER — OUTPATIENT (OUTPATIENT)
Dept: OUTPATIENT SERVICES | Facility: HOSPITAL | Age: 69
LOS: 1 days | Discharge: HOME | End: 2019-12-30

## 2019-12-30 ENCOUNTER — APPOINTMENT (OUTPATIENT)
Dept: PODIATRY | Facility: CLINIC | Age: 69
End: 2019-12-30
Payer: MEDICAID

## 2019-12-30 DIAGNOSIS — Z89.421 ACQUIRED ABSENCE OF OTHER RIGHT TOE(S): Chronic | ICD-10-CM

## 2019-12-30 DIAGNOSIS — T81.31XS DISRUPTION OF EXTERNAL OPERATION (SURGICAL) WOUND, NOT ELSEWHERE CLASSIFIED, SEQUELA: ICD-10-CM

## 2019-12-30 PROCEDURE — 99024 POSTOP FOLLOW-UP VISIT: CPT | Mod: NC

## 2019-12-31 PROBLEM — T81.31XS POSTOPERATIVE WOUND DEHISCENCE, SEQUELA: Status: ACTIVE | Noted: 2019-12-31

## 2019-12-31 NOTE — PHYSICAL EXAM
[General Appearance - Alert] : alert [General Appearance - In No Acute Distress] : in no acute distress [FreeTextEntry1] : planter skin appears coapted. There some dehiscence at the lateral and medial aspects of the TMA site. no signs of infection

## 2019-12-31 NOTE — ASSESSMENT
[FreeTextEntry1] : planter skin appears coapted. There some dehiscence at the lateral and medial aspects of the TMA site. no signs of infection.\par \par -debrided tissue at the dehisced site\par -daily Betadine gauze dressing\par -strict non-weight bearing\par -will consider hyperbarics on follow up visit\par -glycemic control. last a1c 9.4% 12/20\par -return one week

## 2019-12-31 NOTE — HISTORY OF PRESENT ILLNESS
[FreeTextEntry1] : 70 y/o male s/p right 3rd ray resection 12/4 and 2nd, 4th and 5th ray resection 12/11. patient subsequently developed dehiscence and underwent revision of dehisced site on 12/17. Pt currently in a rehab facility. patient has been weightbearing to the right foot.  Pt discharged on po levaquin 750mg daily and po flagyl 500mg, as per infectious disease

## 2020-01-07 ENCOUNTER — APPOINTMENT (OUTPATIENT)
Dept: PODIATRY | Facility: CLINIC | Age: 70
End: 2020-01-07
Payer: MEDICARE

## 2020-01-07 ENCOUNTER — OUTPATIENT (OUTPATIENT)
Dept: OUTPATIENT SERVICES | Facility: HOSPITAL | Age: 70
LOS: 1 days | Discharge: HOME | End: 2020-01-07

## 2020-01-07 DIAGNOSIS — Z89.421 ACQUIRED ABSENCE OF OTHER RIGHT TOE(S): Chronic | ICD-10-CM

## 2020-01-07 PROCEDURE — 99024 POSTOP FOLLOW-UP VISIT: CPT

## 2020-01-09 NOTE — PHYSICAL EXAM
[General Appearance - Alert] : alert [General Appearance - In No Acute Distress] : in no acute distress [FreeTextEntry1] : right foot planter skin appears coapted. There is some dehiscence at the lateral and medial aspects of the TMA site, stable; no changes from last visit. no signs of infection

## 2020-01-09 NOTE — ASSESSMENT
[FreeTextEntry1] : planter skin appears coapted. There some dehiscence at the lateral and medial aspects of the TMA site. no signs of infection.\par \par -debrided tissue at the dehisced site\par -daily Betadine gauze dressing\par -strict non-weight bearing\par -sutures removed\par -Discussed hyperbaric therapy with patient. He is willing to receive treatment. Will coordinate with center. \par -glycemic control. last a1c 9.4% 12/20\par -return one week

## 2020-01-09 NOTE — HISTORY OF PRESENT ILLNESS
[FreeTextEntry1] : 70 y/o male s/p right 3rd ray resection 12/4 and 2nd, 4th and 5th ray resection 12/11. patient subsequently developed dehiscence and underwent revision of dehisced site on 12/17. Pt currently in a rehab facility. patient has been weightbearing to the right foot.  Pt discharged on po levaquin 750mg daily and po flagyl 500mg, as per infectious disease.

## 2020-01-14 ENCOUNTER — APPOINTMENT (OUTPATIENT)
Dept: PODIATRY | Facility: CLINIC | Age: 70
End: 2020-01-14
Payer: MEDICARE

## 2020-01-14 ENCOUNTER — OUTPATIENT (OUTPATIENT)
Dept: OUTPATIENT SERVICES | Facility: HOSPITAL | Age: 70
LOS: 1 days | Discharge: HOME | End: 2020-01-14

## 2020-01-14 DIAGNOSIS — Z89.421 ACQUIRED ABSENCE OF OTHER RIGHT TOE(S): Chronic | ICD-10-CM

## 2020-01-14 PROCEDURE — 11042 DBRDMT SUBQ TIS 1ST 20SQCM/<: CPT | Mod: RT,58

## 2020-01-14 NOTE — PHYSICAL EXAM
[General Appearance - Alert] : alert [General Appearance - In No Acute Distress] : in no acute distress [Oriented To Time, Place, And Person] : oriented to person, place, and time [Impaired Insight] : insight and judgment were intact [FreeTextEntry1] : right foot planter skin appears coapted. There is some dehiscence at the lateral and medial aspects of the TMA site, stable; no changes from last visit. no signs of infection

## 2020-01-14 NOTE — END OF VISIT
[] : Resident [Resident] : Resident [FreeTextEntry3] : as per medicare guidelines (informed by hyperbaric facility). pt can is covered for either a rehab facility or HBOT within first 90 days. Once d/c from rehab facility. pt will be referred for HBOT [FreeTextEntry2] : dehisced sites excisionally debrided down to subcutaneous tissue

## 2020-01-14 NOTE — ASSESSMENT
[FreeTextEntry1] : planter skin appears coapted. There some dehiscence at the lateral and medial aspects of the TMA site. no signs of infection.\par \par -debrided tissue at the dehisced site\par -daily Betadine gauze dressing\par -encouraged to remain non-weight bearing. heel WB ok for transfer \par -Discussed hyperbaric therapy with patient - will start after D/C from facility \par -glycemic control. last a1c 9.4% 12/20\par -return one week

## 2020-01-14 NOTE — HISTORY OF PRESENT ILLNESS
[FreeTextEntry1] : 70 y/o male s/p right 3rd ray resection 12/4 and 2nd, 4th and 5th ray resection 12/11. patient subsequently developed dehiscence and underwent revision of dehisced site on 12/17. Pt currently in a rehab facility. patient has been non weightbearing to the right foot.  Pt discharged on po levaquin 750mg daily and po flagyl 500mg, as per infectious disease.

## 2020-01-23 ENCOUNTER — APPOINTMENT (OUTPATIENT)
Dept: PODIATRY | Facility: CLINIC | Age: 70
End: 2020-01-23
Payer: MEDICARE

## 2020-01-23 ENCOUNTER — OUTPATIENT (OUTPATIENT)
Dept: OUTPATIENT SERVICES | Facility: HOSPITAL | Age: 70
LOS: 1 days | Discharge: HOME | End: 2020-01-23

## 2020-01-23 DIAGNOSIS — Z89.421 ACQUIRED ABSENCE OF OTHER RIGHT TOE(S): Chronic | ICD-10-CM

## 2020-01-23 PROCEDURE — 11042 DBRDMT SUBQ TIS 1ST 20SQCM/<: CPT | Mod: 58

## 2020-01-24 NOTE — HISTORY OF PRESENT ILLNESS
[FreeTextEntry1] : 71 y/o male s/p right 3rd ray resection 12/4 and 2nd, 4th and 5th ray resection 12/11. patient subsequently developed dehiscence and underwent revision of dehisced site on 12/17. Pt currently in a rehab facility. patient has been  weightbearing to the right heel.

## 2020-01-24 NOTE — ASSESSMENT
[FreeTextEntry1] : planter skin appears coapted. There some dehiscence at the lateral and medial aspects of the TMA site. no signs of infection.\par \par -debrided tissue at the dehisced site down to subcutaneous tissue with 15 blade \par -daily Betadine gauze dressing\par -encouraged to remain heel WB ok for transfer \par -Discussed hyperbaric therapy with patient - will start after D/C from facility \par -glycemic control. last a1c 9.4% 12/20\par -return one week

## 2020-01-24 NOTE — PHYSICAL EXAM
[General Appearance - In No Acute Distress] : in no acute distress [General Appearance - Alert] : alert [Oriented To Time, Place, And Person] : oriented to person, place, and time [Impaired Insight] : insight and judgment were intact [FreeTextEntry1] : right foot: dehiscence at the lateral and medial aspects of the TMA site, stable; improved from last visit. no signs of infection. Fibrotic tissue present.

## 2020-01-30 ENCOUNTER — OUTPATIENT (OUTPATIENT)
Dept: OUTPATIENT SERVICES | Facility: HOSPITAL | Age: 70
LOS: 1 days | Discharge: HOME | End: 2020-01-30

## 2020-01-30 ENCOUNTER — APPOINTMENT (OUTPATIENT)
Dept: PODIATRY | Facility: CLINIC | Age: 70
End: 2020-01-30
Payer: MEDICARE

## 2020-01-30 DIAGNOSIS — Z89.421 ACQUIRED ABSENCE OF OTHER RIGHT TOE(S): Chronic | ICD-10-CM

## 2020-01-30 PROCEDURE — 11042 DBRDMT SUBQ TIS 1ST 20SQCM/<: CPT | Mod: 58

## 2020-01-30 NOTE — ASSESSMENT
[FreeTextEntry1] : planter skin appears coapted. There some dehiscence at the lateral and medial aspects of the TMA site. no signs of infection.\par \par -debrided tissue at the dehisced site down to subcutaneous tissue with 15 blade \par -daily Vashti/DSD/Klerlix\par -WBAT R foot\par -Discussed hyperbaric therapy with patient - will start after D/C from facility \par -glycemic control. last a1c 9.4% 12/20\par -return one week

## 2020-01-30 NOTE — END OF VISIT
[FreeTextEntry3] : debrided tissue at the dehisced site down to subcutaneous tissue with 15 blade  [Resident] : Resident [FreeTextEntry2] : right foot debrided tissue at the dehisced site down to subcutaneous tissue with 15 blade

## 2020-01-30 NOTE — PHYSICAL EXAM
[General Appearance - Alert] : alert [General Appearance - In No Acute Distress] : in no acute distress [Oriented To Time, Place, And Person] : oriented to person, place, and time [Impaired Insight] : insight and judgment were intact [FreeTextEntry1] : non palpable pulses R foot

## 2020-01-30 NOTE — HISTORY OF PRESENT ILLNESS
[FreeTextEntry1] : 69 y/o male s/p right 3rd ray resection 12/4 and 2nd, 4th and 5th ray resection 12/11. patient subsequently developed dehiscence and underwent revision of dehisced site on 12/17. Pt currently in a rehab facility. patient has been  weightbearing as tolerated

## 2020-02-06 ENCOUNTER — APPOINTMENT (OUTPATIENT)
Dept: PODIATRY | Facility: CLINIC | Age: 70
End: 2020-02-06
Payer: MEDICARE

## 2020-02-06 ENCOUNTER — OUTPATIENT (OUTPATIENT)
Dept: OUTPATIENT SERVICES | Facility: HOSPITAL | Age: 70
LOS: 1 days | Discharge: HOME | End: 2020-02-06

## 2020-02-06 DIAGNOSIS — Z89.421 ACQUIRED ABSENCE OF OTHER RIGHT TOE(S): Chronic | ICD-10-CM

## 2020-02-06 PROCEDURE — 11042 DBRDMT SUBQ TIS 1ST 20SQCM/<: CPT | Mod: RT,58

## 2020-02-06 NOTE — PROGRESS NOTE ADULT - PROVIDER SPECIALTY LIST ADULT
"[facility] Call Center    Phone Message    May a detailed message be left on voicemail: yes     Reason for Call: Other: The pt states she still having R side pain, she is taking Flomax-hoping to pass the stone. She states \"With her history she proably won't be able to pass the stone. Does there pt need a CT scan or ultrasound before her appt 4.7.2020. Pl;ease call the pt to discuss. Thanks    Action Taken: Message routed to:  [facility] ([facility]): valeria uro    Travel Screening: Not Applicable                                                                        " Podiatry

## 2020-02-07 NOTE — PHYSICAL EXAM
[General Appearance - Alert] : alert [General Appearance - In No Acute Distress] : in no acute distress [Oriented To Time, Place, And Person] : oriented to person, place, and time [Impaired Insight] : insight and judgment were intact [FreeTextEntry1] : right foot: mild dehiscence at the lateral and medial aspects of the TMA site, stable; continues to improve. no signs of infection. Fibrotic tissue present.

## 2020-02-07 NOTE — HISTORY OF PRESENT ILLNESS
[FreeTextEntry1] : 71 y/o male s/p right 3rd ray resection 12/4 and 2nd, 4th and 5th ray resection 12/11. patient subsequently developed dehiscence and underwent revision of dehisced site on 12/17. Pt currently in a rehab facility. patient has been  weightbearing as tolerated. patient is planned for d/c from rehab today

## 2020-02-07 NOTE — ASSESSMENT
[FreeTextEntry1] : planter skin appears coapted. There some dehiscence at the lateral and medial aspects of the TMA site. no signs of infection.\par \par -debrided tissue at the dehisced site down to subcutaneous tissue with 15 blade \par -daily Vashti/DSD/Klerlix\par -WBAT R foot\par -referral for hyperbaric therapy \par -glycemic control. last a1c 9.4% 12/20\par -return one week\par -will submit  to [facility] for wound dressing

## 2020-02-13 ENCOUNTER — APPOINTMENT (OUTPATIENT)
Dept: PODIATRY | Facility: CLINIC | Age: 70
End: 2020-02-13
Payer: MEDICARE

## 2020-02-13 ENCOUNTER — OUTPATIENT (OUTPATIENT)
Dept: OUTPATIENT SERVICES | Facility: HOSPITAL | Age: 70
LOS: 1 days | Discharge: HOME | End: 2020-02-13

## 2020-02-13 DIAGNOSIS — Z89.421 ACQUIRED ABSENCE OF OTHER RIGHT TOE(S): Chronic | ICD-10-CM

## 2020-02-13 PROCEDURE — 11042 DBRDMT SUBQ TIS 1ST 20SQCM/<: CPT | Mod: 58

## 2020-02-19 NOTE — HISTORY OF PRESENT ILLNESS
[FreeTextEntry1] : 69 y/o male s/p right 3rd ray resection 12/4 and 2nd, 4th and 5th ray resection 12/11. patient subsequently developed dehiscence and underwent revision of dehisced site on 12/17. Pt currently at home and has VNS come to home for dressings changes daily. Patient has been weightbearing as tolerated.Pt. denies any recent n/f/v/c/sob. Pt. denies any other pedal complaints at this time.

## 2020-02-19 NOTE — ASSESSMENT
[FreeTextEntry1] : planter skin appears coapted. There some dehiscence at the lateral and medial aspects of the TMA site. no signs of infection.\par \par -Pt. seen and evaluated\par -Discussed treatment and condition w/ patient\par -debrided tissue at the dehisced site down to subcutaneous tissue with dermal curette\par -daily Santyl/DSD/Klerlix\par -WBAT R foot\par -referral for hyperbaric therapy \par -Pt. to RTC in 2 weeks\par

## 2020-02-19 NOTE — END OF VISIT
[] : Resident [FreeTextEntry2] : dehisced surgical site  excisionally debrided down to subQ tissue [FreeTextEntry3] : pt getting d/c from rehab today. Pt to start HBOT

## 2020-02-27 ENCOUNTER — OUTPATIENT (OUTPATIENT)
Dept: OUTPATIENT SERVICES | Facility: HOSPITAL | Age: 70
LOS: 1 days | Discharge: HOME | End: 2020-02-27

## 2020-02-27 ENCOUNTER — APPOINTMENT (OUTPATIENT)
Dept: PODIATRY | Facility: CLINIC | Age: 70
End: 2020-02-27
Payer: MEDICARE

## 2020-02-27 DIAGNOSIS — Z89.421 ACQUIRED ABSENCE OF OTHER RIGHT TOE(S): Chronic | ICD-10-CM

## 2020-02-27 PROCEDURE — 11042 DBRDMT SUBQ TIS 1ST 20SQCM/<: CPT | Mod: 58

## 2020-02-29 NOTE — ASSESSMENT
[FreeTextEntry1] : planter skin appears coapted. There is  some dehiscence at the lateral and medial aspects of the TMA site. no signs of infection.\par \par -Pt. seen and evaluated\par -Discussed treatment and condition w/ patient\par -debrided tissue at the dehisced site down to subcutaneous tissue with dermal curette\par -daily Santyl/DSD/Klerlix\par -WBAT R foot\par -continue with hyperbaric therapy \par Patient given a prescription for diabetic shoes. Patient would benefit from supportive diabetic shoes that would support accommodate for transmetatarsal amputation.  . Shoes are indicated based on patients diagnosis of peripheral neuropathy with transmetatarsal amputation\par \par -Pt. to RTC in 2 weeks\par

## 2020-02-29 NOTE — PHYSICAL EXAM
[General Appearance - Alert] : alert [General Appearance - In No Acute Distress] : in no acute distress [Impaired Insight] : insight and judgment were intact [Oriented To Time, Place, And Person] : oriented to person, place, and time [FreeTextEntry1] : right foot: dehiscence at the lateral and medial aspects of the TMA site, stable and improving. no signs of infection. Fibrotic tissue present.

## 2020-02-29 NOTE — HISTORY OF PRESENT ILLNESS
[FreeTextEntry1] : 71 y/o male s/p right 3rd ray resection 12/4 and 2nd, 4th and 5th ray resection 12/11. patient subsequently developed dehiscence and underwent revision of dehisced site on 12/17. Pt currently at home and has VNS come to home for dressings changes daily. Patient has been weightbearing as tolerated.Pt. denies any recent n/f/v/c/sob. Pt. denies any other pedal complaints at this time. Patient started HBOT

## 2020-03-01 ENCOUNTER — OUTPATIENT (OUTPATIENT)
Dept: OUTPATIENT SERVICES | Facility: HOSPITAL | Age: 70
LOS: 1 days | End: 2020-03-01
Payer: MEDICARE

## 2020-03-01 DIAGNOSIS — Z89.421 ACQUIRED ABSENCE OF OTHER RIGHT TOE(S): Chronic | ICD-10-CM

## 2020-03-01 PROCEDURE — G9001: CPT

## 2020-03-12 ENCOUNTER — OUTPATIENT (OUTPATIENT)
Dept: OUTPATIENT SERVICES | Facility: HOSPITAL | Age: 70
LOS: 1 days | Discharge: HOME | End: 2020-03-12
Payer: MEDICARE

## 2020-03-12 ENCOUNTER — APPOINTMENT (OUTPATIENT)
Dept: PODIATRY | Facility: CLINIC | Age: 70
End: 2020-03-12
Payer: MEDICARE

## 2020-03-12 ENCOUNTER — INPATIENT (INPATIENT)
Facility: HOSPITAL | Age: 70
LOS: 10 days | Discharge: HOME HEALTH | End: 2020-03-23
Attending: HOSPITALIST | Admitting: HOSPITALIST
Payer: MEDICARE

## 2020-03-12 VITALS
DIASTOLIC BLOOD PRESSURE: 91 MMHG | RESPIRATION RATE: 17 BRPM | OXYGEN SATURATION: 100 % | TEMPERATURE: 97 F | HEART RATE: 99 BPM | SYSTOLIC BLOOD PRESSURE: 195 MMHG

## 2020-03-12 VITALS
HEIGHT: 71 IN | DIASTOLIC BLOOD PRESSURE: 76 MMHG | SYSTOLIC BLOOD PRESSURE: 160 MMHG | WEIGHT: 206 LBS | BODY MASS INDEX: 28.84 KG/M2 | HEART RATE: 94 BPM

## 2020-03-12 DIAGNOSIS — Z89.421 ACQUIRED ABSENCE OF OTHER RIGHT TOE(S): Chronic | ICD-10-CM

## 2020-03-12 DIAGNOSIS — L03.115 CELLULITIS OF RIGHT LOWER LIMB: ICD-10-CM

## 2020-03-12 LAB
ALBUMIN SERPL ELPH-MCNC: 4.2 G/DL — SIGNIFICANT CHANGE UP (ref 3.5–5.2)
ALP SERPL-CCNC: 124 U/L — HIGH (ref 30–115)
ALT FLD-CCNC: 29 U/L — SIGNIFICANT CHANGE UP (ref 0–41)
ANION GAP SERPL CALC-SCNC: 12 MMOL/L — SIGNIFICANT CHANGE UP (ref 7–14)
APTT BLD: 20.6 SEC — CRITICAL LOW (ref 27–39.2)
AST SERPL-CCNC: 24 U/L — SIGNIFICANT CHANGE UP (ref 0–41)
BASOPHILS # BLD AUTO: 0.01 K/UL — SIGNIFICANT CHANGE UP (ref 0–0.2)
BASOPHILS NFR BLD AUTO: 0.1 % — SIGNIFICANT CHANGE UP (ref 0–1)
BILIRUB SERPL-MCNC: 0.6 MG/DL — SIGNIFICANT CHANGE UP (ref 0.2–1.2)
BLD GP AB SCN SERPL QL: SIGNIFICANT CHANGE UP
BUN SERPL-MCNC: 17 MG/DL — SIGNIFICANT CHANGE UP (ref 10–20)
CALCIUM SERPL-MCNC: 9.6 MG/DL — SIGNIFICANT CHANGE UP (ref 8.5–10.1)
CHLORIDE SERPL-SCNC: 99 MMOL/L — SIGNIFICANT CHANGE UP (ref 98–110)
CO2 SERPL-SCNC: 25 MMOL/L — SIGNIFICANT CHANGE UP (ref 17–32)
CREAT SERPL-MCNC: 1.1 MG/DL — SIGNIFICANT CHANGE UP (ref 0.7–1.5)
EOSINOPHIL # BLD AUTO: 0.04 K/UL — SIGNIFICANT CHANGE UP (ref 0–0.7)
EOSINOPHIL NFR BLD AUTO: 0.4 % — SIGNIFICANT CHANGE UP (ref 0–8)
GLUCOSE BLDC GLUCOMTR-MCNC: 288 MG/DL — HIGH (ref 70–99)
GLUCOSE SERPL-MCNC: 394 MG/DL — HIGH (ref 70–99)
HCT VFR BLD CALC: 34.7 % — LOW (ref 42–52)
HGB BLD-MCNC: 11.7 G/DL — LOW (ref 14–18)
IMM GRANULOCYTES NFR BLD AUTO: 0.6 % — HIGH (ref 0.1–0.3)
INR BLD: 1.07 RATIO — SIGNIFICANT CHANGE UP (ref 0.65–1.3)
LYMPHOCYTES # BLD AUTO: 0.65 K/UL — LOW (ref 1.2–3.4)
LYMPHOCYTES # BLD AUTO: 6.3 % — LOW (ref 20.5–51.1)
MCHC RBC-ENTMCNC: 30.1 PG — SIGNIFICANT CHANGE UP (ref 27–31)
MCHC RBC-ENTMCNC: 33.7 G/DL — SIGNIFICANT CHANGE UP (ref 32–37)
MCV RBC AUTO: 89.2 FL — SIGNIFICANT CHANGE UP (ref 80–94)
MONOCYTES # BLD AUTO: 0.74 K/UL — HIGH (ref 0.1–0.6)
MONOCYTES NFR BLD AUTO: 7.2 % — SIGNIFICANT CHANGE UP (ref 1.7–9.3)
NEUTROPHILS # BLD AUTO: 8.81 K/UL — HIGH (ref 1.4–6.5)
NEUTROPHILS NFR BLD AUTO: 85.4 % — HIGH (ref 42.2–75.2)
NRBC # BLD: 0 /100 WBCS — SIGNIFICANT CHANGE UP (ref 0–0)
PLATELET # BLD AUTO: 283 K/UL — SIGNIFICANT CHANGE UP (ref 130–400)
POTASSIUM SERPL-MCNC: 5.4 MMOL/L — HIGH (ref 3.5–5)
POTASSIUM SERPL-SCNC: 5.4 MMOL/L — HIGH (ref 3.5–5)
PROT SERPL-MCNC: 7.6 G/DL — SIGNIFICANT CHANGE UP (ref 6–8)
PROTHROM AB SERPL-ACNC: 12.3 SEC — SIGNIFICANT CHANGE UP (ref 9.95–12.87)
RBC # BLD: 3.89 M/UL — LOW (ref 4.7–6.1)
RBC # FLD: 14.7 % — HIGH (ref 11.5–14.5)
SODIUM SERPL-SCNC: 136 MMOL/L — SIGNIFICANT CHANGE UP (ref 135–146)
WBC # BLD: 10.31 K/UL — SIGNIFICANT CHANGE UP (ref 4.8–10.8)
WBC # FLD AUTO: 10.31 K/UL — SIGNIFICANT CHANGE UP (ref 4.8–10.8)

## 2020-03-12 PROCEDURE — 93926 LOWER EXTREMITY STUDY: CPT | Mod: 26,RT

## 2020-03-12 PROCEDURE — 93970 EXTREMITY STUDY: CPT | Mod: 26

## 2020-03-12 PROCEDURE — 99213 OFFICE O/P EST LOW 20 MIN: CPT

## 2020-03-12 PROCEDURE — 73620 X-RAY EXAM OF FOOT: CPT | Mod: 26,RT

## 2020-03-12 PROCEDURE — 99285 EMERGENCY DEPT VISIT HI MDM: CPT | Mod: GC

## 2020-03-12 RX ORDER — INSULIN LISPRO 100/ML
VIAL (ML) SUBCUTANEOUS
Refills: 0 | Status: DISCONTINUED | OUTPATIENT
Start: 2020-03-12 | End: 2020-03-16

## 2020-03-12 RX ORDER — CHLORHEXIDINE GLUCONATE 213 G/1000ML
1 SOLUTION TOPICAL
Refills: 0 | Status: DISCONTINUED | OUTPATIENT
Start: 2020-03-12 | End: 2020-03-16

## 2020-03-12 RX ORDER — LISINOPRIL 2.5 MG/1
40 TABLET ORAL DAILY
Refills: 0 | Status: DISCONTINUED | OUTPATIENT
Start: 2020-03-12 | End: 2020-03-16

## 2020-03-12 RX ORDER — MEROPENEM 1 G/30ML
1000 INJECTION INTRAVENOUS EVERY 8 HOURS
Refills: 0 | Status: DISCONTINUED | OUTPATIENT
Start: 2020-03-12 | End: 2020-03-13

## 2020-03-12 RX ORDER — NIFEDIPINE 30 MG
60 TABLET, EXTENDED RELEASE 24 HR ORAL DAILY
Refills: 0 | Status: DISCONTINUED | OUTPATIENT
Start: 2020-03-12 | End: 2020-03-16

## 2020-03-12 RX ORDER — DEXTROSE 50 % IN WATER 50 %
15 SYRINGE (ML) INTRAVENOUS ONCE
Refills: 0 | Status: DISCONTINUED | OUTPATIENT
Start: 2020-03-12 | End: 2020-03-16

## 2020-03-12 RX ORDER — INSULIN LISPRO 100/ML
6 VIAL (ML) SUBCUTANEOUS
Refills: 0 | Status: DISCONTINUED | OUTPATIENT
Start: 2020-03-12 | End: 2020-03-13

## 2020-03-12 RX ORDER — DEXTROSE 50 % IN WATER 50 %
25 SYRINGE (ML) INTRAVENOUS ONCE
Refills: 0 | Status: DISCONTINUED | OUTPATIENT
Start: 2020-03-12 | End: 2020-03-16

## 2020-03-12 RX ORDER — HEPARIN SODIUM 5000 [USP'U]/ML
5000 INJECTION INTRAVENOUS; SUBCUTANEOUS EVERY 12 HOURS
Refills: 0 | Status: DISCONTINUED | OUTPATIENT
Start: 2020-03-12 | End: 2020-03-16

## 2020-03-12 RX ORDER — ASPIRIN/CALCIUM CARB/MAGNESIUM 324 MG
81 TABLET ORAL DAILY
Refills: 0 | Status: DISCONTINUED | OUTPATIENT
Start: 2020-03-12 | End: 2020-03-16

## 2020-03-12 RX ORDER — PANTOPRAZOLE SODIUM 20 MG/1
40 TABLET, DELAYED RELEASE ORAL
Refills: 0 | Status: DISCONTINUED | OUTPATIENT
Start: 2020-03-12 | End: 2020-03-16

## 2020-03-12 RX ORDER — INSULIN GLARGINE 100 [IU]/ML
30 INJECTION, SOLUTION SUBCUTANEOUS EVERY MORNING
Refills: 0 | Status: DISCONTINUED | OUTPATIENT
Start: 2020-03-13 | End: 2020-03-14

## 2020-03-12 RX ORDER — GLUCAGON INJECTION, SOLUTION 0.5 MG/.1ML
1 INJECTION, SOLUTION SUBCUTANEOUS ONCE
Refills: 0 | Status: DISCONTINUED | OUTPATIENT
Start: 2020-03-12 | End: 2020-03-16

## 2020-03-12 RX ORDER — METRONIDAZOLE 500 MG
500 TABLET ORAL ONCE
Refills: 0 | Status: COMPLETED | OUTPATIENT
Start: 2020-03-12 | End: 2020-03-12

## 2020-03-12 RX ORDER — SODIUM CHLORIDE 9 MG/ML
1000 INJECTION, SOLUTION INTRAVENOUS
Refills: 0 | Status: DISCONTINUED | OUTPATIENT
Start: 2020-03-12 | End: 2020-03-16

## 2020-03-12 RX ORDER — CEFTRIAXONE 500 MG/1
2000 INJECTION, POWDER, FOR SOLUTION INTRAMUSCULAR; INTRAVENOUS ONCE
Refills: 0 | Status: COMPLETED | OUTPATIENT
Start: 2020-03-12 | End: 2020-03-12

## 2020-03-12 RX ORDER — ATORVASTATIN CALCIUM 80 MG/1
40 TABLET, FILM COATED ORAL AT BEDTIME
Refills: 0 | Status: DISCONTINUED | OUTPATIENT
Start: 2020-03-12 | End: 2020-03-16

## 2020-03-12 RX ORDER — DEXTROSE 50 % IN WATER 50 %
12.5 SYRINGE (ML) INTRAVENOUS ONCE
Refills: 0 | Status: DISCONTINUED | OUTPATIENT
Start: 2020-03-12 | End: 2020-03-16

## 2020-03-12 RX ADMIN — Medication 100 MILLIGRAM(S): at 17:16

## 2020-03-12 RX ADMIN — Medication 2 TABLET(S): at 23:01

## 2020-03-12 RX ADMIN — LISINOPRIL 40 MILLIGRAM(S): 2.5 TABLET ORAL at 23:02

## 2020-03-12 RX ADMIN — CEFTRIAXONE 100 MILLIGRAM(S): 500 INJECTION, POWDER, FOR SOLUTION INTRAMUSCULAR; INTRAVENOUS at 17:10

## 2020-03-12 RX ADMIN — ATORVASTATIN CALCIUM 40 MILLIGRAM(S): 80 TABLET, FILM COATED ORAL at 23:02

## 2020-03-12 RX ADMIN — MEROPENEM 100 MILLIGRAM(S): 1 INJECTION INTRAVENOUS at 23:01

## 2020-03-12 NOTE — PATIENT PROFILE ADULT - NSPROIMPLANTSMEDDEV_GEN_A_NUR
None Bilobed Flap Text: The defect edges were debeveled with a #15 scalpel blade.  Given the location of the defect and the proximity to free margins a bilobe flap was deemed most appropriate.  Using a sterile surgical marker, an appropriate bilobe flap drawn around the defect.    The area thus outlined was incised deep to adipose tissue with a #15 scalpel blade.  The skin margins were undermined to an appropriate distance in all directions utilizing iris scissors.

## 2020-03-12 NOTE — ED PROVIDER NOTE - NS ED ROS FT
Eyes:  No visual changes, eye pain or discharge.  ENMT:  No hearing changes, pain, discharge or infections. No neck pain or stiffness.  Cardiac:  No chest pain, SOB or edema. No chest pain with exertion.  Respiratory:  No cough or respiratory distress. No hemoptysis. No history of asthma or RAD.  GI:  No nausea, vomiting, diarrhea or abdominal pain.  :  No dysuria, frequency or burning.  MS: R foot infection, weeping from prior amputation site. No myalgia, muscle weakness, joint pain or back pain.  Neuro:  No headache or weakness.  No LOC.  Skin: Erythema and weeping around R foot toe amputation.   Endocrine: No history of thyroid disease. +DM.

## 2020-03-12 NOTE — H&P ADULT - ASSESSMENT
69 year-old male with PMH of insulin-dependent DM c/b diabetic foot ulcers s/p amputations of half of left great toe and entire right great toe, PVD, HTN, HLD, GERD, depression, recently amputated right 3rd toe (Dec 4th) right 4th toe (Dec 11) s/p debridement on Dec 11th presented to the Ed for right foot discharge since yesterday.     In the ED, pt is hemodynamically stable. xray right foot done, not read yet.    # Diabetic foot Ulcer: Right foot  no evidence of sepsis on admission  cw meropenem and bactrim  follow up xray official read   fu podiatry eval  NPO after midnight for possible debridement in the morning  fu blood cultures  fu vascular and ID eval  fu esr / crp    # Insulin-dependent T2DM  -Takes Humalin 30 units at breakfast, Trulicity 0.5 weekly, and Janumet  BID outpatient  -cw insulin  -Monitor fingersticks with meals and at bedtime    # Hypertension  -Takes Benazepril 40 mg qD at home  -C/w Lisinopril 40 mg qD for now  -Monitor BP    # Hyperlipidemia  -Takes Crestor 10 mg qHS at home  -C/w Atorvastatin 40 mg qHS for now    # GERD  -cw PPI    # Depression  -not on any med  -fu outpt psych    DVT ppx: Heparin, hold the morning dose  GI ppx: Protonix  Diet: DASH/TLC consistent carb. NPO after midnight  Activity: OOBTC  Code status: FULL CODE  Dispo: Med-surg. From home  Follow-up: Podiatry / Vascular / ID consult

## 2020-03-12 NOTE — H&P ADULT - HISTORY OF PRESENT ILLNESS
69 year-old male with PMH of insulin-dependent DM c/b diabetic foot ulcers s/p amputations of half of left great toe and entire right great toe, PVD, HTN, HLD, GERD, and depression who presents with worsening of a previously diagnosed R third toe wound. Patient was recently admitted to [facility] from 11/14-11/21 for worsening of the same diabetic foot ulcer of right foot with OM with cultures growing MSSA; patient had PICC line placed prior to discharge and was receiving PO Flagyl 500 mg BID and IV Rocephin 2 g qD with scheduled ID (Dr. Gary Turpin) follow-up.    Today, patient presents with mild pain and numbness in right third toe. Per patient and wife at bedside, he had appointment with Vascular Surgery scheduled for today but decided to come to the ED due to worsening appearance of toe (was starting to appear darker) in addition to feeling short of breath, fatigue, and loss of appetite for past three days. + Pain in R foot, + loss of appetite, + fatigue, + shortness of breath x3 days. Denies fever/chills/HA/change in vision/rhinorrhea/sore throat/cough/chest pain/abdominal pain/constipation/diarrhea. 69 year-old male with PMH of insulin-dependent DM c/b diabetic foot ulcers s/p amputations of half of left great toe and entire right great toe, PVD, HTN, HLD, GERD, depression, recently amputated right 3rd toe (Dec 4th) right 4th toe (Dec 11) s/p debridement on Dec 11th presented to the Ed for right foot discharge since yesterday.     As per the pt, he has been having daily dressing changes since Dec but since yesterday he started noticing slight discharge since last night, had an appointment with podiatry this [facility] and was advised to come to the hospital for further evaluation and possible debridement. Pt denies any active complaints. Denies fever, shortness of breath, cough, rash on the body, recent travel, sick contact, nausea, vomiting, diarrhea, constipation.    In the ED, pt is hemodynamically stable. xray right foot done, not read yet.

## 2020-03-12 NOTE — HISTORY OF PRESENT ILLNESS
[FreeTextEntry1] : 71 y/o male s/p right 3rd ray resection 12/4 and 2nd, 4th and 5th ray resection 12/11. patient subsequently developed dehiscence and underwent revision of dehisced site on 12/17. Pt currently at home and has VNS come to home for dressings changes daily. Patient has been weightbearing as tolerated. Reports increased drainage from TMA site that started last week. States his blood surgar was >500 yesterday and was around 200 today.

## 2020-03-12 NOTE — ED PROVIDER NOTE - OBJECTIVE STATEMENT
70 y M pmh dm, diabetic foot ulcer s/p R toe amputation, hld, gerd, htn pw foot infection. Seen by podiatrist today and noted to have weeping around site of prior toe amputation. Podiatrist sent in to have debridement of R foot. Denies fever, chills, foot pain, cp, sob, abd pain, back pain, n/v.

## 2020-03-12 NOTE — PATIENT PROFILE ADULT - INFORMATION PROVIDED TO:
This medication ([id number]) not listed on her Epic Med list and was last filled at [facility], the RX is being transferred to us. Please address  Thank you    EVER AARON [facility]     patient

## 2020-03-12 NOTE — PHYSICAL EXAM
[General Appearance - Alert] : alert [General Appearance - In No Acute Distress] : in no acute distress [Oriented To Time, Place, And Person] : oriented to person, place, and time [Impaired Insight] : insight and judgment were intact [FreeTextEntry1] : right foot: dehiscence at the lateral aspect of TMA with some healing. Purulent drainage from dehisced site. increased skin temperature w/ erythema

## 2020-03-12 NOTE — ED ADULT NURSE NOTE - NSIMPLEMENTINTERV_GEN_ALL_ED
Implemented All Fall Risk Interventions:  [place] to call system. Call bell, personal items and telephone within reach. Instruct patient to call for assistance. Room bathroom lighting operational. Non-slip footwear when patient is off stretcher. Physically safe environment: no spills, clutter or unnecessary equipment. Stretcher in lowest position, wheels locked, appropriate side rails in place. Provide visual cue, wrist band, yellow gown, etc. Monitor gait and stability. Monitor for mental status changes and reorient to person, place, and time. Review medications for side effects contributing to fall risk. Reinforce activity limits and safety measures with patient and family.

## 2020-03-12 NOTE — ED PROVIDER NOTE - ATTENDING CONTRIBUTION TO CARE
70yM DM w/ known R DFU presents from podiatry for IV abx.  Pt has been seeing podiatry for chronic R foot wound, s/p toe amputation w/ ongoing drainage.  Pt denies fevers, worsening swelling but admits that wound is not getting better w/ PO abx.  Was sent in by podiatry for IV abx and potential further OR debridement.

## 2020-03-12 NOTE — ASSESSMENT
[FreeTextEntry1] : planter skin appears coapted. There is  some dehiscence at the lateral and medial aspects of the TMA site. no signs of infection.\par \par -Pt. seen and evaluated\par -Discussed treatment and condition w/ patient\par -C&S of drainage taken \par -pt sent to ED for admission, IV antibiotics. pt  may need debridement of tissue\par

## 2020-03-12 NOTE — ED ADULT NURSE NOTE - OBJECTIVE STATEMENT
Pt was sent in by his podiatrist due to infected infected diabetic foot ulcer by amputation site on right foot. Pt assessed, a&ox4, vss, pt denies any pain/discomfort. Pt ambulates with a walker. Pt denies any fever, n/v/d. Will continue to monitor and assess.

## 2020-03-12 NOTE — ED PROVIDER NOTE - PHYSICAL EXAMINATION
CONSTITUTIONAL: Well-developed; well-nourished; in no acute distress.   SKIN: warm, dry  HEAD: Normocephalic; atraumatic.  EYES: PERRL, EOMI, normal sclera and conjunctiva   ENT: No nasal discharge; airway clear.  NECK: Supple; non tender.  CARD: S1, S2 normal; no murmurs, gallops, or rubs. Regular rate and rhythm.   RESP: No wheezes, rales or rhonchi.  ABD: soft ntnd  EXT: Normal ROM. Dp/pt intact and equal bilaterally. R toes amputated with erythematous and weeping area over surgical site. No crepitus. No ttp over feet. No purulent drainage.   LYMPH: No acute cervical adenopathy.  NEURO: Alert, oriented, grossly unremarkable  PSYCH: Cooperative, appropriate.

## 2020-03-12 NOTE — ED PROVIDER NOTE - CLINICAL SUMMARY MEDICAL DECISION MAKING FREE TEXT BOX
72yM poorly controlled DM p/w ongoing R diabetic foot ulcer, sent in by podiatry for IV abx and possible debridement. Pt well appearing, with chronic R DFU w/ ongoing drainage.  Xray w/o apparent OM or SQ gas.  Labs sent.  IV abx started.  Ok for floor.

## 2020-03-13 ENCOUNTER — TRANSCRIPTION ENCOUNTER (OUTPATIENT)
Age: 70
End: 2020-03-13

## 2020-03-13 DIAGNOSIS — Z02.9 ENCOUNTER FOR ADMINISTRATIVE EXAMINATIONS, UNSPECIFIED: ICD-10-CM

## 2020-03-13 LAB
ALBUMIN SERPL ELPH-MCNC: 3.4 G/DL — LOW (ref 3.5–5.2)
ALP SERPL-CCNC: 98 U/L — SIGNIFICANT CHANGE UP (ref 30–115)
ALT FLD-CCNC: 19 U/L — SIGNIFICANT CHANGE UP (ref 0–41)
ANION GAP SERPL CALC-SCNC: 10 MMOL/L — SIGNIFICANT CHANGE UP (ref 7–14)
AST SERPL-CCNC: 11 U/L — SIGNIFICANT CHANGE UP (ref 0–41)
BASOPHILS # BLD AUTO: 0.02 K/UL — SIGNIFICANT CHANGE UP (ref 0–0.2)
BASOPHILS NFR BLD AUTO: 0.3 % — SIGNIFICANT CHANGE UP (ref 0–1)
BILIRUB SERPL-MCNC: 0.4 MG/DL — SIGNIFICANT CHANGE UP (ref 0.2–1.2)
BLD GP AB SCN SERPL QL: SIGNIFICANT CHANGE UP
BUN SERPL-MCNC: 15 MG/DL — SIGNIFICANT CHANGE UP (ref 10–20)
CALCIUM SERPL-MCNC: 9 MG/DL — SIGNIFICANT CHANGE UP (ref 8.5–10.1)
CHLORIDE SERPL-SCNC: 100 MMOL/L — SIGNIFICANT CHANGE UP (ref 98–110)
CHOLEST SERPL-MCNC: 174 MG/DL — SIGNIFICANT CHANGE UP (ref 100–200)
CO2 SERPL-SCNC: 25 MMOL/L — SIGNIFICANT CHANGE UP (ref 17–32)
CREAT SERPL-MCNC: 0.9 MG/DL — SIGNIFICANT CHANGE UP (ref 0.7–1.5)
EOSINOPHIL # BLD AUTO: 0.08 K/UL — SIGNIFICANT CHANGE UP (ref 0–0.7)
EOSINOPHIL NFR BLD AUTO: 1.1 % — SIGNIFICANT CHANGE UP (ref 0–8)
ERYTHROCYTE [SEDIMENTATION RATE] IN BLOOD: 54 MM/HR — HIGH (ref 0–10)
ESTIMATED AVERAGE GLUCOSE: 186 MG/DL — HIGH (ref 68–114)
GLUCOSE BLDC GLUCOMTR-MCNC: 121 MG/DL — HIGH (ref 70–99)
GLUCOSE BLDC GLUCOMTR-MCNC: 200 MG/DL — HIGH (ref 70–99)
GLUCOSE BLDC GLUCOMTR-MCNC: 298 MG/DL — HIGH (ref 70–99)
GLUCOSE SERPL-MCNC: 309 MG/DL — HIGH (ref 70–99)
HBA1C BLD-MCNC: 8.1 % — HIGH (ref 4–5.6)
HCT VFR BLD CALC: 29.9 % — LOW (ref 42–52)
HDLC SERPL-MCNC: 44 MG/DL — SIGNIFICANT CHANGE UP
HGB BLD-MCNC: 9.8 G/DL — LOW (ref 14–18)
IMM GRANULOCYTES NFR BLD AUTO: 0.8 % — HIGH (ref 0.1–0.3)
LIPID PNL WITH DIRECT LDL SERPL: 111 MG/DL — SIGNIFICANT CHANGE UP (ref 4–129)
LYMPHOCYTES # BLD AUTO: 0.67 K/UL — LOW (ref 1.2–3.4)
LYMPHOCYTES # BLD AUTO: 9.5 % — LOW (ref 20.5–51.1)
MAGNESIUM SERPL-MCNC: 1.8 MG/DL — SIGNIFICANT CHANGE UP (ref 1.8–2.4)
MCHC RBC-ENTMCNC: 28.7 PG — SIGNIFICANT CHANGE UP (ref 27–31)
MCHC RBC-ENTMCNC: 32.8 G/DL — SIGNIFICANT CHANGE UP (ref 32–37)
MCV RBC AUTO: 87.7 FL — SIGNIFICANT CHANGE UP (ref 80–94)
MONOCYTES # BLD AUTO: 0.69 K/UL — HIGH (ref 0.1–0.6)
MONOCYTES NFR BLD AUTO: 9.8 % — HIGH (ref 1.7–9.3)
NEUTROPHILS # BLD AUTO: 5.55 K/UL — SIGNIFICANT CHANGE UP (ref 1.4–6.5)
NEUTROPHILS NFR BLD AUTO: 78.5 % — HIGH (ref 42.2–75.2)
NRBC # BLD: 0 /100 WBCS — SIGNIFICANT CHANGE UP (ref 0–0)
PLATELET # BLD AUTO: 271 K/UL — SIGNIFICANT CHANGE UP (ref 130–400)
POTASSIUM SERPL-MCNC: 4.5 MMOL/L — SIGNIFICANT CHANGE UP (ref 3.5–5)
POTASSIUM SERPL-SCNC: 4.5 MMOL/L — SIGNIFICANT CHANGE UP (ref 3.5–5)
PROT SERPL-MCNC: 6.1 G/DL — SIGNIFICANT CHANGE UP (ref 6–8)
RBC # BLD: 3.41 M/UL — LOW (ref 4.7–6.1)
RBC # FLD: 14.6 % — HIGH (ref 11.5–14.5)
SODIUM SERPL-SCNC: 135 MMOL/L — SIGNIFICANT CHANGE UP (ref 135–146)
TOTAL CHOLESTEROL/HDL RATIO MEASUREMENT: 4 RATIO — SIGNIFICANT CHANGE UP (ref 4–5.5)
TRIGL SERPL-MCNC: 82 MG/DL — SIGNIFICANT CHANGE UP (ref 10–149)
WBC # BLD: 7.07 K/UL — SIGNIFICANT CHANGE UP (ref 4.8–10.8)
WBC # FLD AUTO: 7.07 K/UL — SIGNIFICANT CHANGE UP (ref 4.8–10.8)

## 2020-03-13 PROCEDURE — 99222 1ST HOSP IP/OBS MODERATE 55: CPT

## 2020-03-13 PROCEDURE — 99223 1ST HOSP IP/OBS HIGH 75: CPT

## 2020-03-13 RX ORDER — INSULIN LISPRO 100/ML
8 VIAL (ML) SUBCUTANEOUS
Refills: 0 | Status: DISCONTINUED | OUTPATIENT
Start: 2020-03-13 | End: 2020-03-16

## 2020-03-13 RX ADMIN — LISINOPRIL 40 MILLIGRAM(S): 2.5 TABLET ORAL at 05:59

## 2020-03-13 RX ADMIN — HEPARIN SODIUM 5000 UNIT(S): 5000 INJECTION INTRAVENOUS; SUBCUTANEOUS at 17:29

## 2020-03-13 RX ADMIN — INSULIN GLARGINE 30 UNIT(S): 100 INJECTION, SOLUTION SUBCUTANEOUS at 10:42

## 2020-03-13 RX ADMIN — PANTOPRAZOLE SODIUM 40 MILLIGRAM(S): 20 TABLET, DELAYED RELEASE ORAL at 06:00

## 2020-03-13 RX ADMIN — Medication 2 TABLET(S): at 05:59

## 2020-03-13 RX ADMIN — Medication 81 MILLIGRAM(S): at 13:18

## 2020-03-13 RX ADMIN — MEROPENEM 100 MILLIGRAM(S): 1 INJECTION INTRAVENOUS at 13:16

## 2020-03-13 RX ADMIN — Medication 6 UNIT(S): at 08:51

## 2020-03-13 RX ADMIN — MEROPENEM 100 MILLIGRAM(S): 1 INJECTION INTRAVENOUS at 06:00

## 2020-03-13 RX ADMIN — Medication 1: at 11:58

## 2020-03-13 RX ADMIN — Medication 3: at 08:50

## 2020-03-13 RX ADMIN — Medication 8 UNIT(S): at 17:27

## 2020-03-13 RX ADMIN — ATORVASTATIN CALCIUM 40 MILLIGRAM(S): 80 TABLET, FILM COATED ORAL at 22:40

## 2020-03-13 RX ADMIN — Medication 8 UNIT(S): at 11:59

## 2020-03-13 RX ADMIN — Medication 60 MILLIGRAM(S): at 05:59

## 2020-03-13 NOTE — PROGRESS NOTE ADULT - SUBJECTIVE AND OBJECTIVE BOX
SUBJECTIVE:    Patient is a 70y old Male who presents with a chief complaint of Diabetic foot ulcer (13 Mar 2020 08:39)    Currently admitted to medicine with the primary diagnosis of Diabetic foot ulcer     Today is hospital day 1d. This morning he is resting comfortably in bed and reports no new issues or overnight events. Denies CP, palpitations, SOB, abdominal pain.  No foot pain at this time.     PAST MEDICAL & SURGICAL HISTORY  GERD (gastroesophageal reflux disease)  Depression  Diabetic foot ulcer  Dyslipidemia  HTN (hypertension)  DM (diabetes mellitus): 12/27/18 hgb aic  11.2  Toe amputation status, right: (2008)    SOCIAL HISTORY:  Negative for smoking/alcohol/drug use.     ALLERGIES:  No Known Allergies    MEDICATIONS:  STANDING MEDICATIONS  aspirin enteric coated 81 milliGRAM(s) Oral daily  atorvastatin 40 milliGRAM(s) Oral at bedtime  chlorhexidine 4% Liquid 1 Application(s) Topical <User Schedule>  dextrose 40% Gel 15 Gram(s) Oral once PRN Blood Glucose LESS THAN 70 milliGRAM(s)/deciliter  dextrose 5%. 1000 milliLiter(s) (50 mL/Hr) IV Continuous <Continuous>  dextrose 50% Injectable 12.5 Gram(s) IV Push once  dextrose 50% Injectable 25 Gram(s) IV Push once  dextrose 50% Injectable 25 Gram(s) IV Push once  glucagon  Injectable 1 milliGRAM(s) IntraMuscular once PRN Glucose LESS THAN 70 milligrams/deciliter  heparin  Injectable 5000 Unit(s) SubCutaneous every 12 hours  insulin glargine Injectable (LANTUS) 30 Unit(s) SubCutaneous every morning  insulin lispro (HumaLOG) corrective regimen sliding scale   SubCutaneous three times a day before meals  insulin lispro Injectable (HumaLOG) 8 Unit(s) SubCutaneous three times a day before meals  lisinopril 40 milliGRAM(s) Oral daily  meropenem  IVPB 1000 milliGRAM(s) IV Intermittent every 8 hours  NIFEdipine XL 60 milliGRAM(s) Oral daily  pantoprazole    Tablet 40 milliGRAM(s) Oral before breakfast  trimethoprim  160 mG/sulfamethoxazole 800 mG 2 Tablet(s) Oral two times a day    PRN MEDICATIONS  dextrose 40% Gel 15 Gram(s) Oral once PRN  glucagon  Injectable 1 milliGRAM(s) IntraMuscular once PRN    VITALS:   T(F): 99.2  HR: 76 (72 - 99)  BP: 142/68 (142/68 - 196/95)  RR: 18 (17 - 18)  SpO2: 100% (100% - 100%)    LABS:                        9.8    7.07  )-----------( 271      ( 13 Mar 2020 07:36 )             29.9     03-13    135  |  100  |  15  ----------------------------<  309<H>  4.5   |  25  |  0.9    Ca    9.0      13 Mar 2020 07:36  Mg     1.8     03-13    TPro  6.1  /  Alb  3.4<L>  /  TBili  0.4  /  DBili  x   /  AST  11  /  ALT  19  /  AlkPhos  98  03-13    PT/INR - ( 12 Mar 2020 16:18 )   PT: 12.30 sec;   INR: 1.07 ratio         PTT - ( 12 Mar 2020 16:18 )  PTT:20.6 sec      Sedimentation Rate, Erythrocyte: 54 mm/Hr <H> (03-13-20 @ 07:36)    PHYSICAL EXAM:  GEN: In no acute distress. Pt. is awake in bed able to have a conversation.  LUNGS: CTABL, Symmetrical inspiration, no increased work of breathing  HEART: +S1,S2, RRR, No murmurs, Rubs, Gallops   ABD: Bowel Sounds Present, Soft, non tender, non distended, no guarding, no rebound.   EXT: R foot in dressing - CDI. no calf tenderness bl  NEURO: AAOX3. No focal deficits.

## 2020-03-13 NOTE — CONSULT NOTE ADULT - ASSESSMENT
69 year-old male with PMH of insulin-dependent DM c/b diabetic foot ulcers s/p amputations of half of left great toe and entire right great toe, PVD, HTN, HLD, GERD, depression, recently amputated right 3rd toe (Dec 4th) right 4th toe (Dec 11) s/p debridement on Dec 11th presented to the Ed for right foot discharge since yesterday. .  Vascular surgery is consulted for right foot ulcer. Patient is s/p RLE angiogram on 11/20 by Dr. Espinosa, he 69 year-old male with PMH of insulin-dependent DM c/b diabetic foot ulcers s/p amputations of half of left great toe and entire right great toe, PVD, HTN, HLD, GERD, depression, recently amputated right 3rd toe (Dec 4th) right 4th toe (Dec 11) s/p debridement on Dec 11th presented to the Ed for right foot discharge since yesterday. .  Vascular surgery is consulted for right foot ulcer. Patient is s/p RLE angiogram on 11/20 by Dr. Espinosa, he had two vessel PT and Peroneal runoff. Today, patient had dopplerable DP pulses bilaterally, his LE motor and sensation are intact. He is ambulating, he has no resting leg pain or leg claudication symptoms. his LE arterial duplex on 11/15 showed moderate right tibial disease with no LLE arterial disease    Plan  - No acute vascular surgery intervention  - repeat arterial duplex today  -Outpatient f/u 69 year-old male with PMH of insulin-dependent DM c/b diabetic foot ulcers s/p amputations of half of left great toe and entire right great toe, PVD, HTN, HLD, GERD, depression, recently amputated right 3rd toe (Dec 4th) right 4th toe (Dec 11) s/p debridement on Dec 11th presented to the Ed for right foot discharge since yesterday. .  Vascular surgery is consulted for right foot ulcer. Patient is s/p RLE angiogram on 11/20 by Dr. Espinosa, he had two vessel PT and Peroneal runoff. Today, patient had dopplerable DP pulses bilaterally, his LE motor and sensation are intact. He is ambulating, he has no resting leg pain or leg claudication symptoms. his LE arterial duplex on 11/15 showed moderate right tibial disease with no LLE arterial disease. repeat arterial duplex on 3/12 revealed moderate PT artery disease    Plan  - No acute vascular surgery intervention  -Outpatient f/u 69 year-old male with PMH of insulin-dependent DM c/b diabetic foot ulcers s/p amputations of half of left great toe and entire right great toe, PVD, HTN, HLD, GERD, depression, recently amputated right 3rd toe (Dec 4th) right 4th toe (Dec 11) s/p debridement on Dec 11th presented to the Ed for right foot discharge since yesterday. .  Vascular surgery is consulted for right foot ulcer. Patient is s/p RLE angiogram on 11/20 by Dr. Epsinosa, he had two vessel PT and Peroneal runoff. Today, patient had dopplerable DP pulses bilaterally, his LE motor and sensation are intact. He is ambulating, he has no resting leg pain or leg claudication symptoms. his LE arterial duplex on 11/15 showed moderate right tibial disease with no LLE arterial disease. repeat arterial duplex on 3/12 revealed mild stenosis of the mid PT artery    Plan  - No acute vascular surgery intervention  -Outpatient f/u

## 2020-03-13 NOTE — CONSULT NOTE ADULT - SUBJECTIVE AND OBJECTIVE BOX
PODIATRY CONSULT   JOSÉ MANUEL PORTER is a pleasant well-nourished, well developed 70y Male in no acute distress, alert awake, and oriented to person, place and time.   Patient is a 70y old  Male who presents with a chief complaint of Diabetic foot ulcer (12 Mar 2020 19:00)    HPI:  69 year-old male with PMH of insulin-dependent DM c/b diabetic foot ulcers s/p amputations of half of left great toe and entire right great toe, PVD, HTN, HLD, GERD, depression, recently amputated right 3rd toe (Dec 4th) right 4th toe (Dec 11) s/p debridement on Dec 11th presented to the Ed for right foot discharge since yesterday.     As per the pt, he has been having daily dressing changes since Dec but since yesterday he started noticing slight discharge since last night, had an appointment with podiatry this mornign and was advised to come to the hospital for further evaluation and possible debridement. Pt denies any active complaints. Denies fever, shortness of breath, cough, rash on the body, recent travel, sick contact, nausea, vomiting, diarrhea, constipation.    In the ED, pt is hemodynamically stable. xray right foot done, not read yet. (12 Mar 2020 19:00)    pt seen and assessed am rounds with attending Dr. Bhatia.     DIABETIC FOOT ULCER  GERD (gastroesophageal reflux disease)  Depression  Diabetic foot ulcer  Dyslipidemia  HTN (hypertension)  DM (diabetes mellitus)      PMH: DIABETIC FOOT ULCER  GERD (gastroesophageal reflux disease)  Depression  Diabetic foot ulcer  Dyslipidemia  HTN (hypertension)  DM (diabetes mellitus)    PSH: Toe amputation status, right    Medication meropenem  IVPB 1000 milliGRAM(s) IV Intermittent every 8 hours  trimethoprim  160 mG/sulfamethoxazole 800 mG 2 Tablet(s) Oral two times a day    Allergy: No Known Allergies        Labs:                        11.7   10.31 )-----------( 283      ( 12 Mar 2020 16:18 )             34.7     PT/INR - ( 12 Mar 2020 16:18 )   PT: 12.30 sec;   INR: 1.07 ratio         PTT - ( 12 Mar 2020 16:18 )  PTT:20.6 sec  03-12    136  |  99  |  17  ----------------------------<  394<H>  5.4<H>   |  25  |  1.1    Ca    9.6      12 Mar 2020 16:18    TPro  7.6  /  Alb  4.2  /  TBili  0.6  /  DBili  x   /  AST  24  /  ALT  29  /  AlkPhos  124<H>  03-12      REVIEW OF SYSTEMS:    CONSTITUTIONAL: No weakness, fevers or chills  EYES/ENT: No visual changes;  No vertigo or throat pain   NECK: No pain or stiffness  RESPIRATORY: No cough, wheezing, hemoptysis; No shortness of breath  CARDIOVASCULAR: No chest pain or palpitations  GASTROINTESTINAL: No abdominal or epigastric pain. No nausea, vomiting, or hematemesis; No diarrhea or constipation. No melena or hematochezia.  GENITOURINARY: No dysuria, frequency or hematuria  NEUROLOGICAL: No numbness or weakness  SKIN: No itching, burning, rashes, or lesions   All other review of systems is negative unless indicated above.  < from: Xray Foot AP + Lateral, Right (03.12.20 @ 17:04) >    EXAM:  XR FOOT 2 VIEWS RT            PROCEDURE DATE:  03/12/2020            INTERPRETATION:  Clinical history:Osteomyelitis, infection, pain  Technique: XR FOOT 2 VIEWS RIGHT  Comparison: 12/3/2019    Findings:    In the interim, there is transmetatarsal amputation of all toes. There are erosive changes involving the lateral aspect of the fifth metatarsal shaft, distal 3 cm. Questionable erosive tract is noted involving the distal aspect of the third metatarsal shaft. First, second and fourth metatarsal stumps are appropriate in appearance. There are midfoot degenerative changes. Multiple staples are noted there is extensive atherosclerosis.    Impression:    Findings are compatible with osteomyelitis involving fifth metatarsal shaft. Questionable involvement of the third metatarsal.        LUKE WING M.D., ATTENDING RADIOLOGIST  This document has been electronically signed. Mar 12 2020 10:20PM                < end of copied text >      O:   Derm: ulceration TMA right laterl forefoot, - hyperkeratotic border , wound size (2 cm X 0.3 cm X 0.5cm) - edema, - sly-wound erythema, + purulence, - fluctuance, + tracking/tunneling, - streaking erythema. + xerosis, + hemosiderin deposits. + active sign of infection.  superficial scab right foot  superficial scab left 2nd/3rd digit  Vascular: Dorsalis Pedis and Posterior Tibial pulses 0/4.  plantar flap CFT WNL, warm to touch right foot  Neuro: Protective sensation diminished to the level of the digits right foot  MSK: mild pain on palpation right foot  h/o of left hallux amp.         Assessment and Plan:   nonhealing TMA right lateral forefoot with pus  superficial scab left 2nd/3rd digit stable    Chart reviewed and Patient evaluated  Discussed diagnosis and treatment with patient  Wound flush with normal saline  Applied iodoform packing with dry sterile dressing  Obtained wound culture to be sent to Pathology  xray right foot reviewed as above  ESR/CRP  vascular following  ID consult  Continue IV abx as per ID  sx booked Monday for 5th met shaft resection with wash out right foot  pre op labs  f/u with attending    spectra [id number] PODIATRY CONSULT   JOSÉ MANUEL PORTER is a pleasant well-nourished, well developed 70y Male in no acute distress, alert awake, and oriented to person, place and time.   Patient is a 70y old  Male who presents with a chief complaint of Diabetic foot ulcer (12 Mar 2020 19:00)    HPI:  69 year-old male with PMH of insulin-dependent DM c/b diabetic foot ulcers s/p amputations of half of left great toe and entire right great toe, PVD, HTN, HLD, GERD, depression, recently amputated right 3rd toe (Dec 4th) right 4th toe (Dec 11) s/p debridement on Dec 11th presented to the Ed for right foot discharge since yesterday.     As per the pt, he has been having daily dressing changes since Dec but since yesterday he started noticing slight discharge since last night, had an appointment with podiatry this mornign and was advised to come to the hospital for further evaluation and possible debridement. Pt denies any active complaints. Denies fever, shortness of breath, cough, rash on the body, recent travel, sick contact, nausea, vomiting, diarrhea, constipation.    In the ED, pt is hemodynamically stable. xray right foot done, not read yet. (12 Mar 2020 19:00)    pt seen and assessed am rounds with attending Dr. Bhatia.     DIABETIC FOOT ULCER  GERD (gastroesophageal reflux disease)  Depression  Diabetic foot ulcer  Dyslipidemia  HTN (hypertension)  DM (diabetes mellitus)      PMH: DIABETIC FOOT ULCER  GERD (gastroesophageal reflux disease)  Depression  Diabetic foot ulcer  Dyslipidemia  HTN (hypertension)  DM (diabetes mellitus)    PSH: Toe amputation status, right    Medication meropenem  IVPB 1000 milliGRAM(s) IV Intermittent every 8 hours  trimethoprim  160 mG/sulfamethoxazole 800 mG 2 Tablet(s) Oral two times a day    Allergy: No Known Allergies        Labs:                        11.7   10.31 )-----------( 283      ( 12 Mar 2020 16:18 )             34.7     PT/INR - ( 12 Mar 2020 16:18 )   PT: 12.30 sec;   INR: 1.07 ratio         PTT - ( 12 Mar 2020 16:18 )  PTT:20.6 sec  03-12    136  |  99  |  17  ----------------------------<  394<H>  5.4<H>   |  25  |  1.1    Ca    9.6      12 Mar 2020 16:18    TPro  7.6  /  Alb  4.2  /  TBili  0.6  /  DBili  x   /  AST  24  /  ALT  29  /  AlkPhos  124<H>  03-12      REVIEW OF SYSTEMS:    CONSTITUTIONAL: No weakness, fevers or chills  EYES/ENT: No visual changes;  No vertigo or throat pain   NECK: No pain or stiffness  RESPIRATORY: No cough, wheezing, hemoptysis; No shortness of breath  CARDIOVASCULAR: No chest pain or palpitations  GASTROINTESTINAL: No abdominal or epigastric pain. No nausea, vomiting, or hematemesis; No diarrhea or constipation. No melena or hematochezia.  GENITOURINARY: No dysuria, frequency or hematuria  NEUROLOGICAL: No numbness or weakness  SKIN: No itching, burning, rashes, or lesions   All other review of systems is negative unless indicated above.  < from: Xray Foot AP + Lateral, Right (03.12.20 @ 17:04) >    EXAM:  XR FOOT 2 VIEWS RT            PROCEDURE DATE:  03/12/2020            INTERPRETATION:  Clinical history:Osteomyelitis, infection, pain  Technique: XR FOOT 2 VIEWS RIGHT  Comparison: 12/3/2019    Findings:    In the interim, there is transmetatarsal amputation of all toes. There are erosive changes involving the lateral aspect of the fifth metatarsal shaft, distal 3 cm. Questionable erosive tract is noted involving the distal aspect of the third metatarsal shaft. First, second and fourth metatarsal stumps are appropriate in appearance. There are midfoot degenerative changes. Multiple staples are noted there is extensive atherosclerosis.    Impression:    Findings are compatible with osteomyelitis involving fifth metatarsal shaft. Questionable involvement of the third metatarsal.        LUKE WING M.D., ATTENDING RADIOLOGIST  This document has been electronically signed. Mar 12 2020 10:20PM                < end of copied text >      O:   Derm: ulceration TMA right laterl forefoot, - hyperkeratotic border , wound size (2 cm X 0.3 cm X 0.5cm) - edema, - sly-wound erythema, + purulence, - fluctuance, + tracking/tunneling, - streaking erythema. + xerosis, + hemosiderin deposits. + active sign of infection.  superficial scab right foot  superficial scab left 2nd/3rd digit  Vascular: Dorsalis Pedis and Posterior Tibial pulses 0/4.  plantar flap CFT WNL, warm to touch right foot  Neuro: Protective sensation diminished to the level of the digits right foot  MSK: mild pain on palpation right foot  h/o of left hallux amp.         Assessment and Plan:   nonhealing TMA right lateral forefoot with pus  superficial scab left 2nd/3rd digit stable    Chart reviewed and Patient evaluated  Discussed diagnosis and treatment with patient  Wound flush with normal saline  Applied iodoform packing with dry sterile dressing  Obtained wound culture to be sent to Pathology  xray right foot reviewed as above  ESR/CRP  vascular following  ID consult  Continue IV abx as per ID  Sx Scheduled for Tuesday 3/17 @ PM for Debridement w/ Wound Washout Right Foot   NPO @ Midnight Monday 3/16; Pre-Op Labs; Optimization   Request Medical Clearance and OR Stratification   Discussed Plan w/ Dr. Jannette Carcamo;  PODIATRY CONSULT   JOSÉ MANUEL PORTER is a pleasant well-nourished, well developed 70y Male in no acute distress, alert awake, and oriented to person, place and time.   Patient is a 70y old  Male who presents with a chief complaint of Diabetic foot ulcer (12 Mar 2020 19:00)    HPI:  69 year-old male with PMH of insulin-dependent DM c/b diabetic foot ulcers s/p amputations of half of left great toe and entire right great toe, PVD, HTN, HLD, GERD, depression, recently amputated right 3rd toe (Dec 4th) right 4th toe (Dec 11) s/p debridement on Dec 11th presented to the Ed for right foot discharge since yesterday.     As per the pt, he has been having daily dressing changes since Dec but since yesterday he started noticing slight discharge since last night, had an appointment with podiatry this mornign and was advised to come to the hospital for further evaluation and possible debridement. Pt denies any active complaints. Denies fever, shortness of breath, cough, rash on the body, recent travel, sick contact, nausea, vomiting, diarrhea, constipation.    In the ED, pt is hemodynamically stable. xray right foot done, not read yet. (12 Mar 2020 19:00)    pt seen and assessed am rounds with attending Dr. Bhatia.     DIABETIC FOOT ULCER  GERD (gastroesophageal reflux disease)  Depression  Diabetic foot ulcer  Dyslipidemia  HTN (hypertension)  DM (diabetes mellitus)      PMH: DIABETIC FOOT ULCER  GERD (gastroesophageal reflux disease)  Depression  Diabetic foot ulcer  Dyslipidemia  HTN (hypertension)  DM (diabetes mellitus)    PSH: Toe amputation status, right    Medication meropenem  IVPB 1000 milliGRAM(s) IV Intermittent every 8 hours  trimethoprim  160 mG/sulfamethoxazole 800 mG 2 Tablet(s) Oral two times a day    Allergy: No Known Allergies        Labs:                        11.7   10.31 )-----------( 283      ( 12 Mar 2020 16:18 )             34.7     PT/INR - ( 12 Mar 2020 16:18 )   PT: 12.30 sec;   INR: 1.07 ratio         PTT - ( 12 Mar 2020 16:18 )  PTT:20.6 sec  03-12    136  |  99  |  17  ----------------------------<  394<H>  5.4<H>   |  25  |  1.1    Ca    9.6      12 Mar 2020 16:18    TPro  7.6  /  Alb  4.2  /  TBili  0.6  /  DBili  x   /  AST  24  /  ALT  29  /  AlkPhos  124<H>  03-12      REVIEW OF SYSTEMS:    CONSTITUTIONAL: No weakness, fevers or chills  EYES/ENT: No visual changes;  No vertigo or throat pain   NECK: No pain or stiffness  RESPIRATORY: No cough, wheezing, hemoptysis; No shortness of breath  CARDIOVASCULAR: No chest pain or palpitations  GASTROINTESTINAL: No abdominal or epigastric pain. No nausea, vomiting, or hematemesis; No diarrhea or constipation. No melena or hematochezia.  GENITOURINARY: No dysuria, frequency or hematuria  NEUROLOGICAL: No numbness or weakness  SKIN: No itching, burning, rashes, or lesions   All other review of systems is negative unless indicated above.  < from: Xray Foot AP + Lateral, Right (03.12.20 @ 17:04) >    EXAM:  XR FOOT 2 VIEWS RT            PROCEDURE DATE:  03/12/2020            INTERPRETATION:  Clinical history:Osteomyelitis, infection, pain  Technique: XR FOOT 2 VIEWS RIGHT  Comparison: 12/3/2019    Findings:    In the interim, there is transmetatarsal amputation of all toes. There are erosive changes involving the lateral aspect of the fifth metatarsal shaft, distal 3 cm. Questionable erosive tract is noted involving the distal aspect of the third metatarsal shaft. First, second and fourth metatarsal stumps are appropriate in appearance. There are midfoot degenerative changes. Multiple staples are noted there is extensive atherosclerosis.    Impression:    Findings are compatible with osteomyelitis involving fifth metatarsal shaft. Questionable involvement of the third metatarsal.        LUKE WING M.D., ATTENDING RADIOLOGIST  This document has been electronically signed. Mar 12 2020 10:20PM                < end of copied text >      O:   Derm: ulceration TMA right laterl forefoot, - hyperkeratotic border , wound size (2 cm X 0.3 cm X 0.5cm) - edema, - sly-wound erythema, + purulence, - fluctuance, + tracking/tunneling, - streaking erythema. + xerosis, + hemosiderin deposits. + active sign of infection.  superficial scab right foot  superficial scab left 2nd/3rd digit  Vascular: Dorsalis Pedis and Posterior Tibial pulses 0/4.  plantar flap CFT WNL, warm to touch right foot  Neuro: Protective sensation diminished to the level of the digits right foot  MSK: mild pain on palpation right foot  h/o of left hallux amp.         Assessment and Plan:   nonhealing TMA right lateral forefoot with pus  superficial scab left 2nd/3rd digit stable    Chart reviewed and Patient evaluated  Discussed diagnosis and treatment with patient  Wound flush with normal saline  Applied iodoform packing with dry sterile dressing  Obtained wound culture to be sent to Pathology  xray right foot reviewed as above  ESR/CRP  vascular following  ID consult  Continue IV abx as per ID  Sx Scheduled for Monday 3/16 @ 11:00AM for Debridement w/ Wound Washout Right Foot   NPO @ Midnight [place] 3/15; Pre-Op Labs; Optimization   Request Medical Clearance and OR Stratification   Discussed Plan w/ Dr. Jannette Carcamo;

## 2020-03-13 NOTE — CONSULT NOTE ADULT - ASSESSMENT
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    IMPRESSION  #Osteomyelitis of 5th right metatarsal; possible osteomyelitis of 3rd right metatarsal, so systemic symptoms.     Hx enterobacter & stenotrophomonas 12/2019   Sedimentation Rate, Erythrocyte: 54 mm/Hr (03.13.20 @ 07:36)  #Sepsis ruled out on admission     RECOMMENDATIONS  - Withhold antibiotics until bone cultures are resulted for organism and sensitivity  - Podiatry following

## 2020-03-13 NOTE — PROGRESS NOTE ADULT - ASSESSMENT
69 year-old male with PMH of insulin-dependent DM c/b diabetic foot ulcers s/p amputations of half of left great toe and entire right great toe, PVD, HTN, HLD, GERD, depression, recently amputated right 3rd toe (Dec 4th) right 4th toe (Dec 11) s/p debridement on Dec 11th presented to the Ed for right foot discharge since yesterday.     # Diabetic foot Ulcer: Right foot  -no evidence of sepsis on admission  -cw meropenem and bactrim - pending ID eval   -XR R foot: OM w 5th metatarsal; questionable 3rd metatarsal involvement  -     < end of copied text >    -fu podiatry eval  NPO after midnight for possible debridement in the morning  fu blood cultures  fu vascular and ID eval  fu esr / crp    # Insulin-dependent T2DM  -Takes Humalin 30 units at breakfast, Trulicity 0.5 weekly, and Janumet  BID outpatient  -cw insulin  -Monitor fingersticks with meals and at bedtime    # Hypertension  -Takes Benazepril 40 mg qD at home  -C/w Lisinopril 40 mg qD for now  -Monitor BP    # Hyperlipidemia  -Takes Crestor 10 mg qHS at home  -C/w Atorvastatin 40 mg qHS for now    # GERD  -cw PPI    # Depression  -not on any med  -fu outpt psych    DVT ppx: Heparin, hold the morning dose  GI ppx: Protonix  Diet: DASH/TLC consistent carb. NPO after midnight  Activity: OOBTC  Code status: FULL CODE  Dispo: Med-surg. From home  Follow-up: Podiatry / Vascular / ID consult 69 year-old male with PMH of insulin-dependent DM c/b diabetic foot ulcers s/p amputations of half of left great toe and entire right great toe, PVD, HTN, HLD, GERD, depression, recently amputated right 3rd toe (Dec 4th) right 4th toe (Dec 11) s/p debridement on Dec 11th presented to the Ed for right foot discharge since yesterday.     # Diabetic foot Ulcer: Right foot  -no evidence of sepsis on admission  -cw meropenem and bactrim - pending ID eval   -XR R foot: OM w 5th metatarsal; questionable 3rd metatarsal involvement  - Arterial Duplex R: minimal stenosis of tibial artery  - Venous Duplex BL: no DVT  - Vascular on board: no intervention  - Podiatry: plan for OR monday - will keep NPO Sun after midnight   - fu blood cultures  - esr 54/ crp pend    # Insulin-dependent T2DM  -Takes Humalin 30 units at breakfast, Trulicity 0.5 weekly, and Janumet  BID outpatient  -cw basal bolus insulin at this time  -Monitor fingersticks with meals and at bedtime    # Hypertension  -Takes Benazepril 40 mg qD at home  -C/w Lisinopril 40 mg qD for now  -Monitor BP    # Hyperlipidemia  -Takes Crestor 10 mg qHS at home  -C/w Atorvastatin 40 mg qHS for now    # GERD  -cw PPI    # Depression  -not on any med  -fu outpt psych    DVT ppx: Heparin   GI ppx: Protonix  Diet: DASH/TLC consistent carb.   Activity: OOBTC  Code status: FULL CODE  Dispo: Med-surg. From home  Follow-up: Podiatry / ID consult

## 2020-03-13 NOTE — CONSULT NOTE ADULT - SUBJECTIVE AND OBJECTIVE BOX
JOSÉ MANUEL PORTER [id number]  70y Male  1d    HPI:  69 year-old male with PMH of insulin-dependent DM c/b diabetic foot ulcers s/p amputations of half of left great toe and entire right great toe, PVD, HTN, HLD, GERD, depression, recently amputated right 3rd toe (Dec 4th) right 4th toe (Dec 11) s/p debridement on Dec 11th presented to the Ed for right foot discharge since yesterday.     As per the pt, he has been having daily dressing changes since Dec but since yesterday he started noticing slight discharge since last night, had an appointment with podiatry this [facility] and was advised to come to the hospital for further evaluation and possible debridement. Pt denies any active complaints. Denies fever, shortness of breath, cough, rash on the body, recent travel, sick contact, nausea, vomiting, diarrhea, constipation.    In the ED, pt is hemodynamically stable. xray right foot done, not read yet. (12 Mar 2020 19:00)    Vascular surgery is consulted for non healing right foot ulcer      PAST MEDICAL & SURGICAL HISTORY:  GERD (gastroesophageal reflux disease)  Depression  Diabetic foot ulcer  Dyslipidemia  HTN (hypertension)  DM (diabetes mellitus): 12/27/18 hgb aic  11.2  Toe amputation status, right: (2008)        MEDICATIONS  (STANDING):  aspirin enteric coated 81 milliGRAM(s) Oral daily  atorvastatin 40 milliGRAM(s) Oral at bedtime  chlorhexidine 4% Liquid 1 Application(s) Topical <User Schedule>  dextrose 5%. 1000 milliLiter(s) (50 mL/Hr) IV Continuous <Continuous>  dextrose 50% Injectable 12.5 Gram(s) IV Push once  dextrose 50% Injectable 25 Gram(s) IV Push once  dextrose 50% Injectable 25 Gram(s) IV Push once  heparin  Injectable 5000 Unit(s) SubCutaneous every 12 hours  insulin glargine Injectable (LANTUS) 30 Unit(s) SubCutaneous every morning  insulin lispro (HumaLOG) corrective regimen sliding scale   SubCutaneous three times a day before meals  insulin lispro Injectable (HumaLOG) 6 Unit(s) SubCutaneous three times a day before meals  lisinopril 40 milliGRAM(s) Oral daily  meropenem  IVPB 1000 milliGRAM(s) IV Intermittent every 8 hours  NIFEdipine XL 60 milliGRAM(s) Oral daily  pantoprazole    Tablet 40 milliGRAM(s) Oral before breakfast  trimethoprim  160 mG/sulfamethoxazole 800 mG 2 Tablet(s) Oral two times a day    MEDICATIONS  (PRN):  dextrose 40% Gel 15 Gram(s) Oral once PRN Blood Glucose LESS THAN 70 milliGRAM(s)/deciliter  glucagon  Injectable 1 milliGRAM(s) IntraMuscular once PRN Glucose LESS THAN 70 milligrams/deciliter      Allergies    No Known Allergies    Intolerances        REVIEW OF SYSTEMS    [x] A ten-point review of systems was otherwise negative except as noted.  [ ] Due to altered mental status/intubation, subjective information were not able to be obtained from the patient. History was obtained, to the extent possible, from review of the chart and collateral sources of information.      Vital Signs Last 24 Hrs  T(C): 37.3 (13 Mar 2020 05:00), Max: 37.3 (13 Mar 2020 05:00)  T(F): 99.2 (13 Mar 2020 05:00), Max: 99.2 (13 Mar 2020 05:00)  HR: 76 (13 Mar 2020 05:00) (72 - 99)  BP: 142/68 (13 Mar 2020 05:00) (142/68 - 196/95)  BP(mean): --  RR: 18 (13 Mar 2020 05:00) (17 - 18)  SpO2: 100% (12 Mar 2020 21:54) (100% - 100%)    PHYSICAL EXAM:  GENERAL: NAD, well-appearing  CHEST/LUNG: Clear to auscultation bilaterally  HEART: Regular rate and rhythm  ABDOMEN: Soft, Nontender, Nondistended;   EXTREMITIES:  No clubbing, cyanosis, or edema. Right foot dressing in place, dopplerable DP pulses bilaterally.      LABS:  Labs:  CAPILLARY BLOOD GLUCOSE      POCT Blood Glucose.: 298 mg/dL (13 Mar 2020 07:55)  POCT Blood Glucose.: 288 mg/dL (12 Mar 2020 22:05)                          11.7   10.31 )-----------( 283      ( 12 Mar 2020 16:18 )             34.7       Auto Neutrophil %: 85.4 % (03-12-20 @ 16:18)  Auto Immature Granulocyte %: 0.6 % (03-12-20 @ 16:18)    03-12    136  |  99  |  17  ----------------------------<  394<H>  5.4<H>   |  25  |  1.1      Calcium, Total Serum: 9.6 mg/dL (03-12-20 @ 16:18)      LFTs:             7.6  | 0.6  | 24       ------------------[124     ( 12 Mar 2020 16:18 )  4.2  | x    | 29          Lipase:x      Amylase:x             Coags:     12.30  ----< 1.07    ( 12 Mar 2020 16:18 )     20.6     RADIOLOGY & ADDITIONAL STUDIES:

## 2020-03-13 NOTE — CONSULT NOTE ADULT - SUBJECTIVE AND OBJECTIVE BOX
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    The patient was seen by his podiatrist on March 12th and was noted to have weeping from the site of his previous amputation. He was then sent to the ED for debridement.   Infectious Diseases History:  Old Micro Data/Cultures:     FAMILY HISTORY:  Family history of ischemic heart disease (IHD) (Father)  Family history of lung cancer (Mother)    PAST MEDICAL & SURGICAL HISTORY:  GERD (gastroesophageal reflux disease)  Depression  Diabetic foot ulcer  Dyslipidemia  HTN (hypertension)  DM (diabetes mellitus): 12/27/18 hgb aic  11.2  Toe amputation status, right: (2008)      SOCIAL HISTORY  Social History:  Lives with friend.  denies using illict drugs, smoking cigarettes or Etoh (12 Mar 2020 19:00)      Recent Travel:  Other Exposures:     ROS  General: Denies rigors, nightsweats  HEENT: Denies headache, rhinorrhea, sore throat, eye pain  CV: Denies CP, palpitations  PULM: Denies wheezing, hemoptysis  GI: Denies hematemesis, hematochezia, melena  : Denies discharge, hematuria  MSK: Denies arthralgias, myalgias  SKIN: Denies rash, lesions  NEURO: Denies paresthesias, weakness  PSYCH: Denies depression, anxiety    VITALS:  T(F): 99.2, Max: 99.2 (03-13-20 @ 05:00)  HR: 76  BP: 142/68  RR: 18Vital Signs Last 24 Hrs  T(C): 37.3 (13 Mar 2020 05:00), Max: 37.3 (13 Mar 2020 05:00)  T(F): 99.2 (13 Mar 2020 05:00), Max: 99.2 (13 Mar 2020 05:00)  HR: 76 (13 Mar 2020 05:00) (72 - 99)  BP: 142/68 (13 Mar 2020 05:00) (142/68 - 196/95)  BP(mean): --  RR: 18 (13 Mar 2020 05:00) (17 - 18)  SpO2: 100% (12 Mar 2020 21:54) (100% - 100%)    PHYSICAL EXAM:  Gen: NAD, resting in bed  HEENT: Normocephalic, atraumatic  Neck: supple, no lymphadenopathy  CV: Regular rate & regular rhythm  Lungs: CTAB  Abdomen: Soft, BS present  Ext: Warm, well perfused  Neuro: non focal, awake  Skin: no rash, no lesions  Lines: no phlebitis    TESTS & MEASUREMENTS:                        9.8    7.07  )-----------( 271      ( 13 Mar 2020 07:36 )             29.9     03-13    135  |  100  |  15  ----------------------------<  309<H>  4.5   |  25  |  0.9    Ca    9.0      13 Mar 2020 07:36  Mg     1.8     03-13    TPro  6.1  /  Alb  3.4<L>  /  TBili  0.4  /  DBili  x   /  AST  11  /  ALT  19  /  AlkPhos  98  03-13    eGFR if Non African American: 85 mL/min/1.73M2 (03-13-20 @ 07:36)  eGFR if : 99 mL/min/1.73M2 (03-13-20 @ 07:36)  eGFR if Non African American: 67 mL/min/1.73M2 (03-12-20 @ 16:18)  eGFR if : 77 mL/min/1.73M2 (03-12-20 @ 16:18)    LIVER FUNCTIONS - ( 13 Mar 2020 07:36 )  Alb: 3.4 g/dL / Pro: 6.1 g/dL / ALK PHOS: 98 U/L / ALT: 19 U/L / AST: 11 U/L / GGT: x                     INFECTIOUS DISEASES TESTING      RADIOLOGY & ADDITIONAL TESTS:  < from: Xray Foot AP + Lateral, Right (03.12.20 @ 17:04) >  EXAM:  XR FOOT 2 VIEWS RT            PROCEDURE DATE:  03/12/2020            INTERPRETATION:  Clinical history:Osteomyelitis, infection, pain  Technique: XR FOOT 2 VIEWS RIGHT  Comparison: 12/3/2019    Findings:    In the interim, there is transmetatarsal amputation of all toes. There are erosive changes involving the lateral aspect of the fifth metatarsal shaft, distal 3 cm. Questionable erosive tract is noted involving the distal aspect of the third metatarsal shaft. First, second and fourth metatarsal stumps are appropriate in appearance. There are midfoot degenerative changes. Multiple staples are noted there is extensive atherosclerosis.    Impression:    Findings are compatible with osteomyelitis involving fifth metatarsal shaft. Questionable involvement of the third metatarsal.                  LUKE WING M.D., ATTENDING RADIOLOGIST  This document has been electronically signed. Mar 12 2020 10:20PM        < end of copied text >    < from: [facility] Duplex Lower Ext Vein Scan, Bilat (03.12.20 @ 20:18) >  EXAM:  DUPLEX SCAN EXT VEINS LOWER BI            PROCEDURE DATE:  03/12/2020            INTERPRETATION:  Clinical History / Reason for exam: The patient is a 70-year-old male with lower extremity swelling. A venous duplex examination was performed to evaluate the patient for deep venous thrombosis of the lower extremities.    The common femoral, great saphenous, femoral, popliteal and small saphenous veins were visualized bilaterally with no evidence of deep venous thrombosis    All veins were fully compressible.  There was presence of spontaneous flow, augmentation with distal compression and phasicity.    The anterior tibial veins were  patent     The posterior tibial veins were  patent      The peroneal veins were patent.    Impression:    No evidence of deep venous thrombosis or superficial thrombophlebitis in the bilateral lower extremities.    ICD-10:M79.89              ERVIN GOETZ M.D., RESIDENT RADIOLOGIST  This document has been electronically signed.  MP RUIZ M.D., ATTENDING VASCULAR  This document has been electronically signed. Mar 13 2020  9:36AM    < end of copied text >      < from: [facility] Duplex Low Ext Arterial, Ltd, Right (03.12.20 @ 20:20) >  EXAM:  DUPLEX LOW ARTERIES UNI LTD RT            PROCEDURE DATE:  03/12/2020            INTERPRETATION:  Clinical History / Reason for exam: This patient is a 70-year-old male with foot ulcer. Lower extremity arterial duplex ultrasound was performed to assess for arterial occlusive disease.    Right common femoral, deep femoral, superficial femoral, popliteal, anterior tibial, posterior tibial and peroneal arteries were visualized.    Elevated velocities in the right posterior tibial artery  Impression:  1. Minimal stenosis of the mid posterior tibial artery    ICD-10:I77.1              ERVIN GOETZ M.D., RESIDENT RADIOLOGIST  This document has been electronically signed.  MP RUIZ M.D., ATTENDING VASCULAR  This document has been electronically signed. Mar 13 2020  9:36AM    < end of copied text >      CARDIOLOGY TESTING        All available historical records have been reviewed    MEDICATIONS  aspirin enteric coated 81  atorvastatin 40  chlorhexidine 4% Liquid 1  dextrose 5%. 1000  dextrose 50% Injectable 12.5  dextrose 50% Injectable 25  dextrose 50% Injectable 25  heparin  Injectable 5000  insulin glargine Injectable (LANTUS) 30  insulin lispro (HumaLOG) corrective regimen sliding scale   insulin lispro Injectable (HumaLOG) 8  lisinopril 40  meropenem  IVPB 1000  NIFEdipine XL 60  pantoprazole    Tablet 40  trimethoprim  160 mG/sulfamethoxazole 800 mG 2      ANTIBIOTICS:  meropenem  IVPB 1000 milliGRAM(s) IV Intermittent every 8 hours  trimethoprim  160 mG/sulfamethoxazole 800 mG 2 Tablet(s) Oral two times a day      All available historical data has been reviewed    ASSESSMENT  70yMale with a PMH of Diabetes with diabetic foot ulcers 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    IMPRESSION    RECOMMENDATIONS    This is a pended note. 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    The patient was seen by his podiatrist on March 12th and was noted to have weeping from the right lateral side of his right foot. He was then sent to the ED for debridement.     The patient states that he only felt pain from the site last night, and that today he has no acute complaints. He denies chills, fever, body aches, SOB, cough, N/V/D abdominal pain.    The patient has no acute complaints.   Infectious Diseases History:  Old Micro Data/Cultures:   Surgical swab RIGHT FOOT THIRD METATARSAL 12/04/2019 poly microbial   Enterobacter cloacae ESBL resistant; Stenotrophomonas maltophilia sensitive to levofloxacin and bactrim        FAMILY HISTORY:  Family history of ischemic heart disease (IHD) (Father)  Family history of lung cancer (Mother)    PAST MEDICAL & SURGICAL HISTORY:  GERD (gastroesophageal reflux disease)  Depression  Diabetic foot ulcer  Dyslipidemia  HTN (hypertension)  DM (diabetes mellitus): 12/27/18 hgb aic  11.2  Toe amputation status, right: (2008)      SOCIAL HISTORY  Social History:  Lives with friend.  denies using illict drugs, smoking cigarettes or Etoh (12 Mar 2020 19:00)      ROS  all systems WNL unless otherwise referred to in HPI    VITALS:  T(F): 99.2, Max: 99.2 (03-13-20 @ 05:00)  HR: 76  BP: 142/68  RR: 18Vital Signs Last 24 Hrs  T(C): 37.3 (13 Mar 2020 05:00), Max: 37.3 (13 Mar 2020 05:00)  T(F): 99.2 (13 Mar 2020 05:00), Max: 99.2 (13 Mar 2020 05:00)  HR: 76 (13 Mar 2020 05:00) (72 - 99)  BP: 142/68 (13 Mar 2020 05:00) (142/68 - 196/95)  BP(mean): --  RR: 18 (13 Mar 2020 05:00) (17 - 18)  SpO2: 100% (12 Mar 2020 21:54) (100% - 100%)    PHYSICAL EXAM:  Gen: NAD, resting in bed  HEENT: Normocephalic, atraumatic  Neck: supple, no lymphadenopathy  CV: Regular rate & regular rhythm  Lungs: CTAB  Abdomen: Soft, BS present  Ext: +1 pulses in both extremities, no pitting edema. Total amputation of phalanges on right foot. Packed open sinus lateral to 5th metatarsal,  not currently draining. No erythema or tenderness.   Neuro: non focal, awake  Skin: no rash, no lesions  Lines: no phlebitis    TESTS & MEASUREMENTS:                        9.8    7.07  )-----------( 271      ( 13 Mar 2020 07:36 )             29.9     03-13    135  |  100  |  15  ----------------------------<  309<H>  4.5   |  25  |  0.9    Ca    9.0      13 Mar 2020 07:36  Mg     1.8     03-13    TPro  6.1  /  Alb  3.4<L>  /  TBili  0.4  /  DBili  x   /  AST  11  /  ALT  19  /  AlkPhos  98  03-13    eGFR if Non African American: 85 mL/min/1.73M2 (03-13-20 @ 07:36)  eGFR if : 99 mL/min/1.73M2 (03-13-20 @ 07:36)  eGFR if Non African American: 67 mL/min/1.73M2 (03-12-20 @ 16:18)  eGFR if : 77 mL/min/1.73M2 (03-12-20 @ 16:18)    LIVER FUNCTIONS - ( 13 Mar 2020 07:36 )  Alb: 3.4 g/dL / Pro: 6.1 g/dL / ALK PHOS: 98 U/L / ALT: 19 U/L / AST: 11 U/L / GGT: x                     INFECTIOUS DISEASES TESTING      RADIOLOGY & ADDITIONAL TESTS:  < from: Xray Foot AP + Lateral, Right (03.12.20 @ 17:04) >  EXAM:  XR FOOT 2 VIEWS RT            PROCEDURE DATE:  03/12/2020            INTERPRETATION:  Clinical history:Osteomyelitis, infection, pain  Technique: XR FOOT 2 VIEWS RIGHT  Comparison: 12/3/2019    Findings:    In the interim, there is transmetatarsal amputation of all toes. There are erosive changes involving the lateral aspect of the fifth metatarsal shaft, distal 3 cm. Questionable erosive tract is noted involving the distal aspect of the third metatarsal shaft. First, second and fourth metatarsal stumps are appropriate in appearance. There are midfoot degenerative changes. Multiple staples are noted there is extensive atherosclerosis.    Impression:    Findings are compatible with osteomyelitis involving fifth metatarsal shaft. Questionable involvement of the third metatarsal.                  LUKE WING M.D., ATTENDING RADIOLOGIST  This document has been electronically signed. Mar 12 2020 10:20PM        < end of copied text >    < from: [facility] Duplex Lower Ext Vein Scan, Bilat (03.12.20 @ 20:18) >  EXAM:  DUPLEX SCAN EXT VEINS LOWER BI            PROCEDURE DATE:  03/12/2020            INTERPRETATION:  Clinical History / Reason for exam: The patient is a 70-year-old male with lower extremity swelling. A venous duplex examination was performed to evaluate the patient for deep venous thrombosis of the lower extremities.    The common femoral, great saphenous, femoral, popliteal and small saphenous veins were visualized bilaterally with no evidence of deep venous thrombosis    All veins were fully compressible.  There was presence of spontaneous flow, augmentation with distal compression and phasicity.    The anterior tibial veins were  patent     The posterior tibial veins were  patent      The peroneal veins were patent.    Impression:    No evidence of deep venous thrombosis or superficial thrombophlebitis in the bilateral lower extremities.    ICD-10:M79.89              ERVIN GOETZ M.D., RESIDENT RADIOLOGIST  This document has been electronically signed.  MP RUIZ M.D., ATTENDING VASCULAR  This document has been electronically signed. Mar 13 2020  9:36AM    < end of copied text >      < from: [facility] Duplex Low Ext Arterial, Ltd, Right (03.12.20 @ 20:20) >  EXAM:  DUPLEX LOW ARTERIES UNI LTD RT            PROCEDURE DATE:  03/12/2020            INTERPRETATION:  Clinical History / Reason for exam: This patient is a 70-year-old male with foot ulcer. Lower extremity arterial duplex ultrasound was performed to assess for arterial occlusive disease.    Right common femoral, deep femoral, superficial femoral, popliteal, anterior tibial, posterior tibial and peroneal arteries were visualized.    Elevated velocities in the right posterior tibial artery  Impression:  1. Minimal stenosis of the mid posterior tibial artery    ICD-10:I77.1              ERVIN GOETZ M.D., RESIDENT RADIOLOGIST  This document has been electronically signed.  MP RUIZ M.D., ATTENDING VASCULAR  This document has been electronically signed. Mar 13 2020  9:36AM    < end of copied text >      CARDIOLOGY TESTING        All available historical records have been reviewed    MEDICATIONS  aspirin enteric coated 81  atorvastatin 40  chlorhexidine 4% Liquid 1  dextrose 5%. 1000  dextrose 50% Injectable 12.5  dextrose 50% Injectable 25  dextrose 50% Injectable 25  heparin  Injectable 5000  insulin glargine Injectable (LANTUS) 30  insulin lispro (HumaLOG) corrective regimen sliding scale   insulin lispro Injectable (HumaLOG) 8  lisinopril 40  meropenem  IVPB 1000  NIFEdipine XL 60  pantoprazole    Tablet 40  trimethoprim  160 mG/sulfamethoxazole 800 mG 2      ANTIBIOTICS:  meropenem  IVPB 1000 milliGRAM(s) IV Intermittent every 8 hours  trimethoprim  160 mG/sulfamethoxazole 800 mG 2 Tablet(s) Oral two times a day      All available historical data has been reviewed    ASSESSMENT  70yMale with a PMH of Diabetes with diabetic foot ulcers 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    IMPRESSION  Osteomyelitis of 5th right metatarsal; possible osteomyelitis of 3rd right metatarsal, so systemic symptoms.   RECOMMENDATIONS  Withhold antibiotics until bone cultures are resulted for organism and sensitivity      This is a pended note. 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    The patient was seen by his podiatrist on March 12th and was noted to have weeping from the right lateral side of his right foot. He was then sent to the ED for debridement.     The patient states that he only felt pain from the site last night, and that today he has no acute complaints. He denies chills, fever, body aches, SOB, cough, N/V/D abdominal pain.    The patient has no acute complaints.   Infectious Diseases History:  Old Micro Data/Cultures:   Surgical swab RIGHT FOOT THIRD METATARSAL 12/04/2019 poly microbial   Enterobacter cloacae ESBL resistant; Stenotrophomonas maltophilia sensitive to levofloxacin and bactrim        FAMILY HISTORY:  Family history of ischemic heart disease (IHD) (Father)  Family history of lung cancer (Mother)    PAST MEDICAL & SURGICAL HISTORY:  GERD (gastroesophageal reflux disease)  Depression  Diabetic foot ulcer  Dyslipidemia  HTN (hypertension)  DM (diabetes mellitus): 12/27/18 hgb aic  11.2  Toe amputation status, right: (2008)      SOCIAL HISTORY  Social History:  Lives with friend.  denies using illict drugs, smoking cigarettes or Etoh (12 Mar 2020 19:00)      ROS  all systems WNL unless otherwise referred to in HPI    VITALS:  T(F): 99.2, Max: 99.2 (03-13-20 @ 05:00)  HR: 76  BP: 142/68  RR: 18Vital Signs Last 24 Hrs  T(C): 37.3 (13 Mar 2020 05:00), Max: 37.3 (13 Mar 2020 05:00)  T(F): 99.2 (13 Mar 2020 05:00), Max: 99.2 (13 Mar 2020 05:00)  HR: 76 (13 Mar 2020 05:00) (72 - 99)  BP: 142/68 (13 Mar 2020 05:00) (142/68 - 196/95)  BP(mean): --  RR: 18 (13 Mar 2020 05:00) (17 - 18)  SpO2: 100% (12 Mar 2020 21:54) (100% - 100%)    PHYSICAL EXAM:  Gen: NAD, resting in bed  HEENT: Normocephalic, atraumatic  Neck: supple, no lymphadenopathy  CV: Regular rate & regular rhythm  Lungs: CTAB  Abdomen: Soft, BS present  Ext: +1 pulses in both extremities, no pitting edema. Total amputation of phalanges on right foot. Packed open sinus lateral to 5th metatarsal,  not currently draining. No erythema or tenderness.   Neuro: non focal, awake  Skin: no rash, no lesions  Lines: no phlebitis    TESTS & MEASUREMENTS:                        9.8    7.07  )-----------( 271      ( 13 Mar 2020 07:36 )             29.9     03-13    135  |  100  |  15  ----------------------------<  309<H>  4.5   |  25  |  0.9    Ca    9.0      13 Mar 2020 07:36  Mg     1.8     03-13    TPro  6.1  /  Alb  3.4<L>  /  TBili  0.4  /  DBili  x   /  AST  11  /  ALT  19  /  AlkPhos  98  03-13    eGFR if Non African American: 85 mL/min/1.73M2 (03-13-20 @ 07:36)  eGFR if : 99 mL/min/1.73M2 (03-13-20 @ 07:36)  eGFR if Non African American: 67 mL/min/1.73M2 (03-12-20 @ 16:18)  eGFR if : 77 mL/min/1.73M2 (03-12-20 @ 16:18)    LIVER FUNCTIONS - ( 13 Mar 2020 07:36 )  Alb: 3.4 g/dL / Pro: 6.1 g/dL / ALK PHOS: 98 U/L / ALT: 19 U/L / AST: 11 U/L / GGT: x                     INFECTIOUS DISEASES TESTING      RADIOLOGY & ADDITIONAL TESTS:  < from: Xray Foot AP + Lateral, Right (03.12.20 @ 17:04) >  EXAM:  XR FOOT 2 VIEWS RT            PROCEDURE DATE:  03/12/2020            INTERPRETATION:  Clinical history:Osteomyelitis, infection, pain  Technique: XR FOOT 2 VIEWS RIGHT  Comparison: 12/3/2019    Findings:    In the interim, there is transmetatarsal amputation of all toes. There are erosive changes involving the lateral aspect of the fifth metatarsal shaft, distal 3 cm. Questionable erosive tract is noted involving the distal aspect of the third metatarsal shaft. First, second and fourth metatarsal stumps are appropriate in appearance. There are midfoot degenerative changes. Multiple staples are noted there is extensive atherosclerosis.    Impression:    Findings are compatible with osteomyelitis involving fifth metatarsal shaft. Questionable involvement of the third metatarsal.                  LUKE WING M.D., ATTENDING RADIOLOGIST  This document has been electronically signed. Mar 12 2020 10:20PM        < end of copied text >    < from: [facility] Duplex Lower Ext Vein Scan, Bilat (03.12.20 @ 20:18) >  EXAM:  DUPLEX SCAN EXT VEINS LOWER BI            PROCEDURE DATE:  03/12/2020            INTERPRETATION:  Clinical History / Reason for exam: The patient is a 70-year-old male with lower extremity swelling. A venous duplex examination was performed to evaluate the patient for deep venous thrombosis of the lower extremities.    The common femoral, great saphenous, femoral, popliteal and small saphenous veins were visualized bilaterally with no evidence of deep venous thrombosis    All veins were fully compressible.  There was presence of spontaneous flow, augmentation with distal compression and phasicity.    The anterior tibial veins were  patent     The posterior tibial veins were  patent      The peroneal veins were patent.    Impression:    No evidence of deep venous thrombosis or superficial thrombophlebitis in the bilateral lower extremities.    ICD-10:M79.89              ERVIN GOETZ M.D., RESIDENT RADIOLOGIST  This document has been electronically signed.  MP RUIZ M.D., ATTENDING VASCULAR  This document has been electronically signed. Mar 13 2020  9:36AM    < end of copied text >      < from: [facility] Duplex Low Ext Arterial, Ltd, Right (03.12.20 @ 20:20) >  EXAM:  DUPLEX LOW ARTERIES UNI LTD RT            PROCEDURE DATE:  03/12/2020            INTERPRETATION:  Clinical History / Reason for exam: This patient is a 70-year-old male with foot ulcer. Lower extremity arterial duplex ultrasound was performed to assess for arterial occlusive disease.    Right common femoral, deep femoral, superficial femoral, popliteal, anterior tibial, posterior tibial and peroneal arteries were visualized.    Elevated velocities in the right posterior tibial artery  Impression:  1. Minimal stenosis of the mid posterior tibial artery    ICD-10:I77.1              ERVIN GOETZ M.D., RESIDENT RADIOLOGIST  This document has been electronically signed.  MP RUIZ M.D., ATTENDING VASCULAR  This document has been electronically signed. Mar 13 2020  9:36AM    < end of copied text >      CARDIOLOGY TESTING        All available historical records have been reviewed    MEDICATIONS  aspirin enteric coated 81  atorvastatin 40  chlorhexidine 4% Liquid 1  dextrose 5%. 1000  dextrose 50% Injectable 12.5  dextrose 50% Injectable 25  dextrose 50% Injectable 25  heparin  Injectable 5000  insulin glargine Injectable (LANTUS) 30  insulin lispro (HumaLOG) corrective regimen sliding scale   insulin lispro Injectable (HumaLOG) 8  lisinopril 40  meropenem  IVPB 1000  NIFEdipine XL 60  pantoprazole    Tablet 40  trimethoprim  160 mG/sulfamethoxazole 800 mG 2      ANTIBIOTICS:  meropenem  IVPB 1000 milliGRAM(s) IV Intermittent every 8 hours  trimethoprim  160 mG/sulfamethoxazole 800 mG 2 Tablet(s) Oral two times a day      All available historical data has been reviewed

## 2020-03-13 NOTE — DISCHARGE NOTE NURSING/CASE MANAGEMENT/SOCIAL WORK - PATIENT PORTAL LINK FT
You can access the FollowMyHealth Patient Portal offered by [facility] [place] by registering at the following website: [email or web address]. By joining [facility]’s FollowMyHealth portal, you will also be able to view your health information using other applications (apps) compatible with our system.

## 2020-03-13 NOTE — CONSULT NOTE ADULT - ATTENDING COMMENTS
acute onset of purulent drainage from lateral aspect of TMA  Osteo of 5th possible 3rd metatarsal  OR monday for debridement of tissue/bone, metatarsal resection possible bone biopsy  f/u cultures  continue antibiotics   glycemic control  med clearance
I have personally examined the patient and reviewed the documentation above.  Corrections and edits were made wherever needed.
known to me with history of PVD and DFU  continue care per podiatry  has 2 vessel runoff  can follow as outpt

## 2020-03-13 NOTE — PROGRESS NOTE ADULT - ATTENDING COMMENTS
69 year-old male with PMH of insulin-dependent DM c/b diabetic foot ulcers s/p amputations of half of left great toe and entire right great toe, PVD, HTN, HLD, GERD, depression, recently amputated right 3rd toe (Dec 4th) right 4th toe (Dec 11) s/p debridement on Dec 11th presented to the Ed for right foot discharge since yesterday.     # Diabetic foot Ulcer: Right foot  #Osteomyelitis  -no evidence of sepsis on admission  -s/p meropenem and bactrim - ID wants to hold off Abx for now pending Dbx.   -XR R foot: OM w 5th metatarsal; questionable 3rd metatarsal involvement  - Arterial Duplex R: minimal stenosis of tibial artery  - Venous Duplex BL: no DVT  - Vascular on board: no intervention  - Podiatry: plan for OR monday - will keep NPO Sun after midnight   - fu blood cultures  - esr 54/ crp pend    # Insulin-dependent T2DM  -Takes Humalin 30 units at breakfast, Trulicity 0.5 weekly, and Janumet  BID outpatient  -cw basal bolus insulin at this time  -Monitor fingersticks with meals and at bedtime    # Hypertension  -Takes Benazepril 40 mg qD at home  -C/w Lisinopril 40 mg qD for now  -Monitor BP    # Hyperlipidemia  -Takes Crestor 10 mg qHS at home  -C/w Atorvastatin 40 mg qHS for now    # GERD  -cw PPI    # Depression  -not on any med  -fu outpt psych    DVT ppx: Heparin   GI ppx: Protonix  Diet: DASH/TLC consistent carb.   Activity: OOBTC  Code status: FULL CODE  Dispo: Med-surg. From home  Follow-up: Podiatry / ID consult .

## 2020-03-13 NOTE — DISCHARGE NOTE NURSING/CASE MANAGEMENT/SOCIAL WORK - NSDCFUADDAPPT_GEN_ALL_CORE_FT
- ID follow-up with America Turpin 3/24 11AM      [address] [facility] [place]       [phone] (call to make an appointment, walk-ins Tuesdays 10:30 AM)      Fax [phone]

## 2020-03-14 LAB
ANION GAP SERPL CALC-SCNC: 11 MMOL/L — SIGNIFICANT CHANGE UP (ref 7–14)
BUN SERPL-MCNC: 23 MG/DL — HIGH (ref 10–20)
CALCIUM SERPL-MCNC: 8.9 MG/DL — SIGNIFICANT CHANGE UP (ref 8.5–10.1)
CHLORIDE SERPL-SCNC: 104 MMOL/L — SIGNIFICANT CHANGE UP (ref 98–110)
CO2 SERPL-SCNC: 24 MMOL/L — SIGNIFICANT CHANGE UP (ref 17–32)
CREAT SERPL-MCNC: 1.3 MG/DL — SIGNIFICANT CHANGE UP (ref 0.7–1.5)
CRP SERPL-MCNC: 7.31 MG/DL — HIGH (ref 0–0.4)
GLUCOSE BLDC GLUCOMTR-MCNC: 159 MG/DL — HIGH (ref 70–99)
GLUCOSE BLDC GLUCOMTR-MCNC: 169 MG/DL — HIGH (ref 70–99)
GLUCOSE BLDC GLUCOMTR-MCNC: 194 MG/DL — HIGH (ref 70–99)
GLUCOSE BLDC GLUCOMTR-MCNC: 205 MG/DL — HIGH (ref 70–99)
GLUCOSE BLDC GLUCOMTR-MCNC: 248 MG/DL — HIGH (ref 70–99)
GLUCOSE SERPL-MCNC: 225 MG/DL — HIGH (ref 70–99)
HCT VFR BLD CALC: 30.3 % — LOW (ref 42–52)
HGB BLD-MCNC: 9.9 G/DL — LOW (ref 14–18)
MAGNESIUM SERPL-MCNC: 2 MG/DL — SIGNIFICANT CHANGE UP (ref 1.8–2.4)
MCHC RBC-ENTMCNC: 28.7 PG — SIGNIFICANT CHANGE UP (ref 27–31)
MCHC RBC-ENTMCNC: 32.7 G/DL — SIGNIFICANT CHANGE UP (ref 32–37)
MCV RBC AUTO: 87.8 FL — SIGNIFICANT CHANGE UP (ref 80–94)
NRBC # BLD: 0 /100 WBCS — SIGNIFICANT CHANGE UP (ref 0–0)
PLATELET # BLD AUTO: 290 K/UL — SIGNIFICANT CHANGE UP (ref 130–400)
POTASSIUM SERPL-MCNC: 4.1 MMOL/L — SIGNIFICANT CHANGE UP (ref 3.5–5)
POTASSIUM SERPL-SCNC: 4.1 MMOL/L — SIGNIFICANT CHANGE UP (ref 3.5–5)
RBC # BLD: 3.45 M/UL — LOW (ref 4.7–6.1)
RBC # FLD: 14.8 % — HIGH (ref 11.5–14.5)
SODIUM SERPL-SCNC: 139 MMOL/L — SIGNIFICANT CHANGE UP (ref 135–146)
TSH SERPL-MCNC: 1.12 UIU/ML — SIGNIFICANT CHANGE UP (ref 0.27–4.2)
WBC # BLD: 6.23 K/UL — SIGNIFICANT CHANGE UP (ref 4.8–10.8)
WBC # FLD AUTO: 6.23 K/UL — SIGNIFICANT CHANGE UP (ref 4.8–10.8)

## 2020-03-14 PROCEDURE — 99231 SBSQ HOSP IP/OBS SF/LOW 25: CPT

## 2020-03-14 RX ORDER — INSULIN GLARGINE 100 [IU]/ML
33 INJECTION, SOLUTION SUBCUTANEOUS EVERY MORNING
Refills: 0 | Status: DISCONTINUED | OUTPATIENT
Start: 2020-03-14 | End: 2020-03-16

## 2020-03-14 RX ORDER — INSULIN LISPRO 100/ML
3 VIAL (ML) SUBCUTANEOUS ONCE
Refills: 0 | Status: COMPLETED | OUTPATIENT
Start: 2020-03-14 | End: 2020-03-14

## 2020-03-14 RX ADMIN — PANTOPRAZOLE SODIUM 40 MILLIGRAM(S): 20 TABLET, DELAYED RELEASE ORAL at 05:43

## 2020-03-14 RX ADMIN — Medication 1: at 17:38

## 2020-03-14 RX ADMIN — Medication 60 MILLIGRAM(S): at 05:43

## 2020-03-14 RX ADMIN — Medication 8 UNIT(S): at 17:37

## 2020-03-14 RX ADMIN — Medication 2: at 07:49

## 2020-03-14 RX ADMIN — Medication 81 MILLIGRAM(S): at 12:08

## 2020-03-14 RX ADMIN — Medication 8 UNIT(S): at 12:07

## 2020-03-14 RX ADMIN — ATORVASTATIN CALCIUM 40 MILLIGRAM(S): 80 TABLET, FILM COATED ORAL at 21:15

## 2020-03-14 RX ADMIN — HEPARIN SODIUM 5000 UNIT(S): 5000 INJECTION INTRAVENOUS; SUBCUTANEOUS at 05:43

## 2020-03-14 RX ADMIN — HEPARIN SODIUM 5000 UNIT(S): 5000 INJECTION INTRAVENOUS; SUBCUTANEOUS at 17:38

## 2020-03-14 RX ADMIN — LISINOPRIL 40 MILLIGRAM(S): 2.5 TABLET ORAL at 05:44

## 2020-03-14 RX ADMIN — INSULIN GLARGINE 30 UNIT(S): 100 INJECTION, SOLUTION SUBCUTANEOUS at 07:49

## 2020-03-14 RX ADMIN — Medication 8 UNIT(S): at 07:49

## 2020-03-14 RX ADMIN — Medication 3 UNIT(S): at 02:10

## 2020-03-14 RX ADMIN — Medication 1: at 12:08

## 2020-03-14 RX ADMIN — CHLORHEXIDINE GLUCONATE 1 APPLICATION(S): 213 SOLUTION TOPICAL at 05:42

## 2020-03-14 NOTE — PROGRESS NOTE ADULT - SUBJECTIVE AND OBJECTIVE BOX
S: No new events/complaints      All other pertinent ROS negative.      Vital Signs Last 24 Hrs  T(C): 35.6 (14 Mar 2020 14:32), Max: 36.6 (13 Mar 2020 20:44)  T(F): 96.1 (14 Mar 2020 14:32), Max: 97.8 (13 Mar 2020 20:44)  HR: 72 (14 Mar 2020 14:32) (72 - 76)  BP: 128/64 (14 Mar 2020 14:32) (101/48 - 128/64)  BP(mean): --  RR: 18 (14 Mar 2020 14:32) (18 - 18)  SpO2: --  PHYSICAL EXAM:    Constitutional: NAD, awake and alert, well-developed  HEENT: PERR, EOMI, Normal Hearing, MMM  Neck: Soft and supple, No LAD, No JVD  Respiratory: Breath sounds are clear bilaterally, No wheezing, rales or rhonchi  Cardiovascular: S1 and S2, regular rate and rhythm, no Murmurs, gallops or rubs  Gastrointestinal: Bowel Sounds present, soft, nontender, nondistended, no guarding, no rebound  Extremities: foot dressed. slight oozing of blood.       MEDICATIONS:  MEDICATIONS  (STANDING):  aspirin enteric coated 81 milliGRAM(s) Oral daily  atorvastatin 40 milliGRAM(s) Oral at bedtime  chlorhexidine 4% Liquid 1 Application(s) Topical <User Schedule>  dextrose 5%. 1000 milliLiter(s) (50 mL/Hr) IV Continuous <Continuous>  dextrose 50% Injectable 12.5 Gram(s) IV Push once  dextrose 50% Injectable 25 Gram(s) IV Push once  dextrose 50% Injectable 25 Gram(s) IV Push once  heparin  Injectable 5000 Unit(s) SubCutaneous every 12 hours  insulin glargine Injectable (LANTUS) 33 Unit(s) SubCutaneous every morning  insulin lispro (HumaLOG) corrective regimen sliding scale   SubCutaneous three times a day before meals  insulin lispro Injectable (HumaLOG) 8 Unit(s) SubCutaneous three times a day before meals  lisinopril 40 milliGRAM(s) Oral daily  NIFEdipine XL 60 milliGRAM(s) Oral daily  pantoprazole    Tablet 40 milliGRAM(s) Oral before breakfast      LABS: All Labs Reviewed:                        9.9    6.23  )-----------( 290      ( 14 Mar 2020 07:19 )             30.3     03-14    139  |  104  |  23<H>  ----------------------------<  225<H>  4.1   |  24  |  1.3    Ca    8.9      14 Mar 2020 07:19  Mg     2.0     03-14    TPro  6.1  /  Alb  3.4<L>  /  TBili  0.4  /  DBili  x   /  AST  11  /  ALT  19  /  AlkPhos  98  03-13    PT/INR - ( 12 Mar 2020 16:18 )   PT: 12.30 sec;   INR: 1.07 ratio         PTT - ( 12 Mar 2020 16:18 )  PTT:20.6 sec      Blood Culture: 03-13 @ 07:36  Organism --  Gram Stain Blood -- Gram Stain --  Specimen Source .Blood None  Culture-Blood --        Radiology: reviewed

## 2020-03-15 LAB
ALBUMIN SERPL ELPH-MCNC: 3.2 G/DL — LOW (ref 3.5–5.2)
ALP SERPL-CCNC: 83 U/L — SIGNIFICANT CHANGE UP (ref 30–115)
ALT FLD-CCNC: 12 U/L — SIGNIFICANT CHANGE UP (ref 0–41)
ANION GAP SERPL CALC-SCNC: 10 MMOL/L — SIGNIFICANT CHANGE UP (ref 7–14)
AST SERPL-CCNC: 9 U/L — SIGNIFICANT CHANGE UP (ref 0–41)
BASOPHILS # BLD AUTO: 0.02 K/UL — SIGNIFICANT CHANGE UP (ref 0–0.2)
BASOPHILS NFR BLD AUTO: 0.4 % — SIGNIFICANT CHANGE UP (ref 0–1)
BILIRUB SERPL-MCNC: 0.2 MG/DL — SIGNIFICANT CHANGE UP (ref 0.2–1.2)
BUN SERPL-MCNC: 22 MG/DL — HIGH (ref 10–20)
CALCIUM SERPL-MCNC: 9 MG/DL — SIGNIFICANT CHANGE UP (ref 8.5–10.1)
CHLORIDE SERPL-SCNC: 107 MMOL/L — SIGNIFICANT CHANGE UP (ref 98–110)
CO2 SERPL-SCNC: 22 MMOL/L — SIGNIFICANT CHANGE UP (ref 17–32)
CREAT SERPL-MCNC: 1.2 MG/DL — SIGNIFICANT CHANGE UP (ref 0.7–1.5)
EOSINOPHIL # BLD AUTO: 0.13 K/UL — SIGNIFICANT CHANGE UP (ref 0–0.7)
EOSINOPHIL NFR BLD AUTO: 2.3 % — SIGNIFICANT CHANGE UP (ref 0–8)
GLUCOSE BLDC GLUCOMTR-MCNC: 161 MG/DL — HIGH (ref 70–99)
GLUCOSE BLDC GLUCOMTR-MCNC: 163 MG/DL — HIGH (ref 70–99)
GLUCOSE BLDC GLUCOMTR-MCNC: 213 MG/DL — HIGH (ref 70–99)
GLUCOSE BLDC GLUCOMTR-MCNC: 318 MG/DL — HIGH (ref 70–99)
GLUCOSE SERPL-MCNC: 221 MG/DL — HIGH (ref 70–99)
HCT VFR BLD CALC: 31.7 % — LOW (ref 42–52)
HGB BLD-MCNC: 10.6 G/DL — LOW (ref 14–18)
IMM GRANULOCYTES NFR BLD AUTO: 0.5 % — HIGH (ref 0.1–0.3)
LYMPHOCYTES # BLD AUTO: 0.87 K/UL — LOW (ref 1.2–3.4)
LYMPHOCYTES # BLD AUTO: 15.5 % — LOW (ref 20.5–51.1)
MAGNESIUM SERPL-MCNC: 2 MG/DL — SIGNIFICANT CHANGE UP (ref 1.8–2.4)
MCHC RBC-ENTMCNC: 29.7 PG — SIGNIFICANT CHANGE UP (ref 27–31)
MCHC RBC-ENTMCNC: 33.4 G/DL — SIGNIFICANT CHANGE UP (ref 32–37)
MCV RBC AUTO: 88.8 FL — SIGNIFICANT CHANGE UP (ref 80–94)
MONOCYTES # BLD AUTO: 0.54 K/UL — SIGNIFICANT CHANGE UP (ref 0.1–0.6)
MONOCYTES NFR BLD AUTO: 9.6 % — HIGH (ref 1.7–9.3)
NEUTROPHILS # BLD AUTO: 4.01 K/UL — SIGNIFICANT CHANGE UP (ref 1.4–6.5)
NEUTROPHILS NFR BLD AUTO: 71.7 % — SIGNIFICANT CHANGE UP (ref 42.2–75.2)
NRBC # BLD: 0 /100 WBCS — SIGNIFICANT CHANGE UP (ref 0–0)
PLATELET # BLD AUTO: 277 K/UL — SIGNIFICANT CHANGE UP (ref 130–400)
POTASSIUM SERPL-MCNC: 4.5 MMOL/L — SIGNIFICANT CHANGE UP (ref 3.5–5)
POTASSIUM SERPL-SCNC: 4.5 MMOL/L — SIGNIFICANT CHANGE UP (ref 3.5–5)
PROT SERPL-MCNC: 6.1 G/DL — SIGNIFICANT CHANGE UP (ref 6–8)
RBC # BLD: 3.57 M/UL — LOW (ref 4.7–6.1)
RBC # FLD: 14.6 % — HIGH (ref 11.5–14.5)
SODIUM SERPL-SCNC: 139 MMOL/L — SIGNIFICANT CHANGE UP (ref 135–146)
WBC # BLD: 5.6 K/UL — SIGNIFICANT CHANGE UP (ref 4.8–10.8)
WBC # FLD AUTO: 5.6 K/UL — SIGNIFICANT CHANGE UP (ref 4.8–10.8)

## 2020-03-15 PROCEDURE — 99232 SBSQ HOSP IP/OBS MODERATE 35: CPT | Mod: GC

## 2020-03-15 RX ADMIN — LISINOPRIL 40 MILLIGRAM(S): 2.5 TABLET ORAL at 05:38

## 2020-03-15 RX ADMIN — INSULIN GLARGINE 33 UNIT(S): 100 INJECTION, SOLUTION SUBCUTANEOUS at 08:14

## 2020-03-15 RX ADMIN — HEPARIN SODIUM 5000 UNIT(S): 5000 INJECTION INTRAVENOUS; SUBCUTANEOUS at 17:25

## 2020-03-15 RX ADMIN — Medication 8 UNIT(S): at 17:23

## 2020-03-15 RX ADMIN — Medication 8 UNIT(S): at 11:57

## 2020-03-15 RX ADMIN — Medication 8 UNIT(S): at 08:14

## 2020-03-15 RX ADMIN — Medication 60 MILLIGRAM(S): at 05:38

## 2020-03-15 RX ADMIN — Medication 2: at 08:15

## 2020-03-15 RX ADMIN — ATORVASTATIN CALCIUM 40 MILLIGRAM(S): 80 TABLET, FILM COATED ORAL at 21:06

## 2020-03-15 RX ADMIN — PANTOPRAZOLE SODIUM 40 MILLIGRAM(S): 20 TABLET, DELAYED RELEASE ORAL at 05:38

## 2020-03-15 RX ADMIN — Medication 1: at 17:23

## 2020-03-15 RX ADMIN — Medication 81 MILLIGRAM(S): at 11:33

## 2020-03-15 RX ADMIN — HEPARIN SODIUM 5000 UNIT(S): 5000 INJECTION INTRAVENOUS; SUBCUTANEOUS at 05:38

## 2020-03-15 RX ADMIN — Medication 4: at 11:57

## 2020-03-15 NOTE — PROGRESS NOTE ADULT - ASSESSMENT
69 year-old male with PMH of insulin-dependent DM c/b diabetic foot ulcers s/p amputations of half of left great toe and entire right great toe, PVD, HTN, HLD, GERD, depression, recently amputated right 3rd toe (Dec 4th) right 4th toe (Dec 11) s/p debridement on Dec 11th presented to the Ed for right foot discharge since yesterday.     # Diabetic foot Ulcer: Right foot  -no evidence of sepsis on admission  -ID eval: will withold antibiotics until bone cultures resulted  -XR R foot: OM w 5th metatarsal; questionable 3rd metatarsal involvement  - Arterial Duplex R: minimal stenosis of tibial artery  - Venous Duplex BL: no DVT  - Vascular on board: no intervention  - Podiatry: plan for OR monday - will keep NPO Sun after midnight   - blood cultures - negative   - esr 54/ crp 7.31    # Insulin-dependent T2DM  -Takes Humalin 30 units at breakfast, Trulicity 0.5 weekly, and Janumet  BID outpatient  -cw basal bolus insulin at this time  -Monitor fingersticks with meals and at bedtime    # Hypertension  -Takes Benazepril 40 mg qD at home  -C/w Lisinopril 40 mg qD for now  -Monitor BP    # Hyperlipidemia  -Takes Crestor 10 mg qHS at home  -C/w Atorvastatin 40 mg qHS for now    # GERD  -cw PPI    # Depression  -not on any med  -fu outpt psych    DVT ppx: Heparin   GI ppx: Protonix  Diet: DASH/TLC consistent carb; NPO after midnight  Activity: OOBTC  Code status: FULL CODE  Dispo: From home   Follow-up: Podiatry for sx

## 2020-03-15 NOTE — PROGRESS NOTE ADULT - SUBJECTIVE AND OBJECTIVE BOX
PODIATRY PROGRESS NOTE   [id number] JSOÉ MANUEL PORTER is a pleasant well-nourished, well developed 70y Male in no acute distress, alert awake, and oriented to person, place and time.   Patient is a 70y old  Male who presents with a chief complaint of Diabetic foot ulcer (15 Mar 2020 07:42)    HPI:  69 year-old male with PMH of insulin-dependent DM c/b diabetic foot ulcers s/p amputations of half of left great toe and entire right great toe, PVD, HTN, HLD, GERD, depression, recently amputated right 3rd toe (Dec 4th) right 4th toe (Dec 11) s/p debridement on Dec 11th presented to the Ed for right foot discharge since yesterday.     As per the pt, he has been having daily dressing changes since Dec but since yesterday he started noticing slight discharge since last night, had an appointment with podiatry this mornign and was advised to come to the hospital for further evaluation and possible debridement. Pt denies any active complaints. Denies fever, shortness of breath, cough, rash on the body, recent travel, sick contact, nausea, vomiting, diarrhea, constipation.    In the ED, pt is hemodynamically stable. xray right foot done, not read yet. (12 Mar 2020 19:00)    pt seen and assessed am rounds at bedside.  pt dressing clean dry intact.      Vital Signs Last 24 Hrs  T(C): 36.5 (15 Mar 2020 05:20), Max: 36.5 (15 Mar 2020 05:20)  T(F): 97.7 (15 Mar 2020 05:20), Max: 97.7 (15 Mar 2020 05:20)  HR: 75 (15 Mar 2020 05:20) (72 - 75)  BP: 126/60 (15 Mar 2020 05:20) (126/60 - 128/64)  BP(mean): --  RR: 18 (15 Mar 2020 05:20) (18 - 18)  SpO2: --                        10.6   5.60  )-----------( 277      ( 15 Mar 2020 07:28 )             31.7                 03-15    139  |  107  |  22<H>  ----------------------------<  221<H>  4.5   |  22  |  1.2    Ca    9.0      15 Mar 2020 07:28  Mg     2.0     03-15    TPro  6.1  /  Alb  3.2<L>  /  TBili  0.2  /  DBili  x   /  AST  9   /  ALT  12  /  AlkPhos  83  03-15    REVIEW OF SYSTEMS:    CONSTITUTIONAL: No weakness, fevers or chills  EYES/ENT: No visual changes;  No vertigo or throat pain   NECK: No pain or stiffness  RESPIRATORY: No cough, wheezing, hemoptysis; No shortness of breath  CARDIOVASCULAR: No chest pain or palpitations  GASTROINTESTINAL: No abdominal or epigastric pain. No nausea, vomiting, or hematemesis; No diarrhea or constipation. No melena or hematochezia.  GENITOURINARY: No dysuria, frequency or hematuria  NEUROLOGICAL: No numbness or weakness  SKIN: No itching, burning, rashes, or lesions   All other review of systems is negative unless indicated above.  < from: Xray Foot AP + Lateral, Right (03.12.20 @ 17:04) >    EXAM:  XR FOOT 2 VIEWS RT            PROCEDURE DATE:  03/12/2020            INTERPRETATION:  Clinical history:Osteomyelitis, infection, pain  Technique: XR FOOT 2 VIEWS RIGHT  Comparison: 12/3/2019    Findings:    In the interim, there is transmetatarsal amputation of all toes. There are erosive changes involving the lateral aspect of the fifth metatarsal shaft, distal 3 cm. Questionable erosive tract is noted involving the distal aspect of the third metatarsal shaft. First, second and fourth metatarsal stumps are appropriate in appearance. There are midfoot degenerative changes. Multiple staples are noted there is extensive atherosclerosis.    Impression:    Findings are compatible with osteomyelitis involving fifth metatarsal shaft. Questionable involvement of the third metatarsal.        LUKE WING M.D., ATTENDING RADIOLOGIST  This document has been electronically signed. Mar 12 2020 10:20PM                < end of copied text >        O:   Derm: ulceration TMA right laterl forefoot, - hyperkeratotic border , wound size (2 cm X 0.3 cm X 0.5cm) - edema, - sly-wound erythema, + purulence, - fluctuance, + tracking/tunneling, - streaking erythema. + xerosis, + hemosiderin deposits. + active sign of infection.  superficial scab right foot  superficial scab left 2nd/3rd digit  Vascular: Dorsalis Pedis and Posterior Tibial pulses 0/4.  plantar flap CFT WNL, warm to touch right foot  Neuro: Protective sensation diminished to the level of the digits right foot  MSK: mild pain on palpation right foot  h/o of left hallux amp.         Assessment and Plan:   nonhealing TMA right lateral forefoot with pus  superficial scab left 2nd/3rd digit stable    Chart reviewed and Patient evaluated  Discussed diagnosis and treatment with patient  Wound flush with normal saline  Applied iodoform packing with dry sterile dressing  Obtained wound culture to be sent to Pathology  xray right foot reviewed as above  ESR 54  CRP 7.31  vascular no sx intervention  Sx Scheduled for Monday 3/16 @ 11:00AM for Debridement w/ Wound Washout Right Foot   NPO MN, optimization, pre op labs   Request Medical Clearance and OR Stratification   f/u w attending    Spectra;      03-15-20 @ 08:24

## 2020-03-15 NOTE — PROGRESS NOTE ADULT - SUBJECTIVE AND OBJECTIVE BOX
SUBJECTIVE:    Patient is a 70y old Male who presents with a chief complaint of Diabetic foot ulcer (14 Mar 2020 16:08)    Currently admitted to medicine with the primary diagnosis of Diabetic foot ulcer.     Today is hospital day 3d. This morning he is resting comfortably in bed and reports no new issues or overnight events.   Pt. denies HA, Cp, Abd Pain or discomfort.**  Pt. is urinating and stooling appropriately.**    PAST MEDICAL & SURGICAL HISTORY  GERD (gastroesophageal reflux disease)  Depression  Diabetic foot ulcer  Dyslipidemia  HTN (hypertension)  DM (diabetes mellitus): 12/27/18 hgb aic  11.2  Toe amputation status, right: (2008)    SOCIAL HISTORY:  Negative for smoking/alcohol/drug use.     ALLERGIES:  No Known Allergies    MEDICATIONS:  STANDING MEDICATIONS  aspirin enteric coated 81 milliGRAM(s) Oral daily  atorvastatin 40 milliGRAM(s) Oral at bedtime  chlorhexidine 4% Liquid 1 Application(s) Topical <User Schedule>  dextrose 40% Gel 15 Gram(s) Oral once PRN Blood Glucose LESS THAN 70 milliGRAM(s)/deciliter  dextrose 5%. 1000 milliLiter(s) (50 mL/Hr) IV Continuous <Continuous>  dextrose 50% Injectable 12.5 Gram(s) IV Push once  dextrose 50% Injectable 25 Gram(s) IV Push once  dextrose 50% Injectable 25 Gram(s) IV Push once  glucagon  Injectable 1 milliGRAM(s) IntraMuscular once PRN Glucose LESS THAN 70 milligrams/deciliter  heparin  Injectable 5000 Unit(s) SubCutaneous every 12 hours  insulin glargine Injectable (LANTUS) 33 Unit(s) SubCutaneous every morning  insulin lispro (HumaLOG) corrective regimen sliding scale   SubCutaneous three times a day before meals  insulin lispro Injectable (HumaLOG) 8 Unit(s) SubCutaneous three times a day before meals  lisinopril 40 milliGRAM(s) Oral daily  NIFEdipine XL 60 milliGRAM(s) Oral daily  pantoprazole    Tablet 40 milliGRAM(s) Oral before breakfast    PRN MEDICATIONS  dextrose 40% Gel 15 Gram(s) Oral once PRN  glucagon  Injectable 1 milliGRAM(s) IntraMuscular once PRN    VITALS:   T(F): 97.7  HR: 75 (72 - 75)  BP: 126/60 (126/60 - 128/64)  RR: 18 (18 - 18)  SpO2: --    LABS:                        9.9    6.23  )-----------( 290      ( 14 Mar 2020 07:19 )             30.3     03-14    139  |  104  |  23<H>  ----------------------------<  225<H>  4.1   |  24  |  1.3    Ca    8.9      14 Mar 2020 07:19  Mg     2.0     03-14      Culture - Blood (collected 13 Mar 2020 07:36)  Source: .Blood None  Preliminary Report (14 Mar 2020 14:01):    No growth to date.    RADIOLOGY:    PHYSICAL EXAM:  GEN: In no acute distress. Pt. is awake in bed able to have a conversation.  LUNGS: CTABL, Symmetrical inspiration, no incresed work of breathing  HEART: +S1,S2, RRR, No murmurs, Rubs, Gallops   ABD: Bowel Sounds Present, Soft, non tender, non distended, no guarding, no rebound.   EXT: 2+ peripheral Pulses, no clubbing, no cyanosis, no Edema.   NEURO: AAOX3. No focal deficits. CN 2-12 Grossly intact. SUBJECTIVE:    Patient is a 70y old Male who presents with a chief complaint of Diabetic foot ulcer (14 Mar 2020 16:08)    Currently admitted to medicine with the primary diagnosis of Diabetic foot ulcer.     Today is hospital day 3d. This morning he is resting comfortably in bed and reports no new issues or overnight events. He denies any leg pain, foot pain, SOB, chest pain.     PAST MEDICAL & SURGICAL HISTORY  GERD (gastroesophageal reflux disease)  Depression  Diabetic foot ulcer  Dyslipidemia  HTN (hypertension)  DM (diabetes mellitus): 12/27/18 hgb aic  11.2  Toe amputation status, right: (2008)    SOCIAL HISTORY:  Negative for smoking/alcohol/drug use.     ALLERGIES:  No Known Allergies    MEDICATIONS:  STANDING MEDICATIONS  aspirin enteric coated 81 milliGRAM(s) Oral daily  atorvastatin 40 milliGRAM(s) Oral at bedtime  chlorhexidine 4% Liquid 1 Application(s) Topical <User Schedule>  dextrose 40% Gel 15 Gram(s) Oral once PRN Blood Glucose LESS THAN 70 milliGRAM(s)/deciliter  dextrose 5%. 1000 milliLiter(s) (50 mL/Hr) IV Continuous <Continuous>  dextrose 50% Injectable 12.5 Gram(s) IV Push once  dextrose 50% Injectable 25 Gram(s) IV Push once  dextrose 50% Injectable 25 Gram(s) IV Push once  glucagon  Injectable 1 milliGRAM(s) IntraMuscular once PRN Glucose LESS THAN 70 milligrams/deciliter  heparin  Injectable 5000 Unit(s) SubCutaneous every 12 hours  insulin glargine Injectable (LANTUS) 33 Unit(s) SubCutaneous every morning  insulin lispro (HumaLOG) corrective regimen sliding scale   SubCutaneous three times a day before meals  insulin lispro Injectable (HumaLOG) 8 Unit(s) SubCutaneous three times a day before meals  lisinopril 40 milliGRAM(s) Oral daily  NIFEdipine XL 60 milliGRAM(s) Oral daily  pantoprazole    Tablet 40 milliGRAM(s) Oral before breakfast    PRN MEDICATIONS  dextrose 40% Gel 15 Gram(s) Oral once PRN  glucagon  Injectable 1 milliGRAM(s) IntraMuscular once PRN    VITALS:   T(F): 97.7  HR: 75 (72 - 75)  BP: 126/60 (126/60 - 128/64)  RR: 18 (18 - 18)  SpO2: --    LABS:                        9.9    6.23  )-----------( 290      ( 14 Mar 2020 07:19 )             30.3     03-14    139  |  104  |  23<H>  ----------------------------<  225<H>  4.1   |  24  |  1.3    Ca    8.9      14 Mar 2020 07:19  Mg     2.0     03-14      Culture - Blood (collected 13 Mar 2020 07:36)  Source: .Blood None  Preliminary Report (14 Mar 2020 14:01):    No growth to date.    PHYSICAL EXAM:  GEN: In no acute distress. Pt. is awake in bed able to have a conversation.  LUNGS: CTABL, Symmetrical inspiration, no increased work of breathing  HEART: +S1,S2, RRR, No murmurs, Rubs, Gallops   ABD: Bowel Sounds Present, Soft, non tender, non distended, no guarding, no rebound.   EXT: RLE wrapped in kerlix, dressing CDI; BL calves, non tender, nonerythematous, non edematous.   NEURO: AAOX3. No focal deficits.

## 2020-03-16 ENCOUNTER — RESULT REVIEW (OUTPATIENT)
Age: 70
End: 2020-03-16

## 2020-03-16 LAB
ANION GAP SERPL CALC-SCNC: 8 MMOL/L — SIGNIFICANT CHANGE UP (ref 7–14)
APTT BLD: 32.1 SEC — SIGNIFICANT CHANGE UP (ref 27–39.2)
BLD GP AB SCN SERPL QL: SIGNIFICANT CHANGE UP
BUN SERPL-MCNC: 24 MG/DL — HIGH (ref 10–20)
CALCIUM SERPL-MCNC: 8.5 MG/DL — SIGNIFICANT CHANGE UP (ref 8.5–10.1)
CHLORIDE SERPL-SCNC: 105 MMOL/L — SIGNIFICANT CHANGE UP (ref 98–110)
CO2 SERPL-SCNC: 24 MMOL/L — SIGNIFICANT CHANGE UP (ref 17–32)
CREAT SERPL-MCNC: 1 MG/DL — SIGNIFICANT CHANGE UP (ref 0.7–1.5)
GLUCOSE BLDC GLUCOMTR-MCNC: 235 MG/DL — HIGH (ref 70–99)
GLUCOSE BLDC GLUCOMTR-MCNC: 242 MG/DL — HIGH (ref 70–99)
GLUCOSE BLDC GLUCOMTR-MCNC: 246 MG/DL — HIGH (ref 70–99)
GLUCOSE BLDC GLUCOMTR-MCNC: 252 MG/DL — HIGH (ref 70–99)
GLUCOSE BLDC GLUCOMTR-MCNC: 254 MG/DL — HIGH (ref 70–99)
GLUCOSE BLDC GLUCOMTR-MCNC: 290 MG/DL — HIGH (ref 70–99)
GLUCOSE SERPL-MCNC: 275 MG/DL — HIGH (ref 70–99)
HCT VFR BLD CALC: 30.3 % — LOW (ref 42–52)
HGB BLD-MCNC: 9.8 G/DL — LOW (ref 14–18)
INR BLD: 1.01 RATIO — SIGNIFICANT CHANGE UP (ref 0.65–1.3)
MAGNESIUM SERPL-MCNC: 1.9 MG/DL — SIGNIFICANT CHANGE UP (ref 1.8–2.4)
MCHC RBC-ENTMCNC: 28.7 PG — SIGNIFICANT CHANGE UP (ref 27–31)
MCHC RBC-ENTMCNC: 32.3 G/DL — SIGNIFICANT CHANGE UP (ref 32–37)
MCV RBC AUTO: 88.9 FL — SIGNIFICANT CHANGE UP (ref 80–94)
NRBC # BLD: 0 /100 WBCS — SIGNIFICANT CHANGE UP (ref 0–0)
PLATELET # BLD AUTO: 300 K/UL — SIGNIFICANT CHANGE UP (ref 130–400)
POTASSIUM SERPL-MCNC: 4.6 MMOL/L — SIGNIFICANT CHANGE UP (ref 3.5–5)
POTASSIUM SERPL-SCNC: 4.6 MMOL/L — SIGNIFICANT CHANGE UP (ref 3.5–5)
PROTHROM AB SERPL-ACNC: 11.6 SEC — SIGNIFICANT CHANGE UP (ref 9.95–12.87)
RBC # BLD: 3.41 M/UL — LOW (ref 4.7–6.1)
RBC # FLD: 14.6 % — HIGH (ref 11.5–14.5)
SODIUM SERPL-SCNC: 137 MMOL/L — SIGNIFICANT CHANGE UP (ref 135–146)
WBC # BLD: 5.21 K/UL — SIGNIFICANT CHANGE UP (ref 4.8–10.8)
WBC # FLD AUTO: 5.21 K/UL — SIGNIFICANT CHANGE UP (ref 4.8–10.8)

## 2020-03-16 PROCEDURE — 99232 SBSQ HOSP IP/OBS MODERATE 35: CPT | Mod: GC

## 2020-03-16 PROCEDURE — 28140 REMOVAL OF METATARSAL: CPT | Mod: 59

## 2020-03-16 PROCEDURE — 20220 BONE BIOPSY TROCAR/NDL SUPFC: CPT | Mod: 59

## 2020-03-16 PROCEDURE — 73630 X-RAY EXAM OF FOOT: CPT | Mod: 26,RT

## 2020-03-16 PROCEDURE — 97597 DBRDMT OPN WND 1ST 20 CM/<: CPT | Mod: 59

## 2020-03-16 PROCEDURE — 88311 DECALCIFY TISSUE: CPT | Mod: 26

## 2020-03-16 PROCEDURE — 88304 TISSUE EXAM BY PATHOLOGIST: CPT | Mod: 26

## 2020-03-16 PROCEDURE — 88305 TISSUE EXAM BY PATHOLOGIST: CPT | Mod: 26

## 2020-03-16 RX ORDER — DEXTROSE 50 % IN WATER 50 %
12.5 SYRINGE (ML) INTRAVENOUS ONCE
Refills: 0 | Status: DISCONTINUED | OUTPATIENT
Start: 2020-03-16 | End: 2020-03-23

## 2020-03-16 RX ORDER — NIFEDIPINE 30 MG
60 TABLET, EXTENDED RELEASE 24 HR ORAL DAILY
Refills: 0 | Status: DISCONTINUED | OUTPATIENT
Start: 2020-03-16 | End: 2020-03-23

## 2020-03-16 RX ORDER — ONDANSETRON 8 MG/1
4 TABLET, FILM COATED ORAL ONCE
Refills: 0 | Status: DISCONTINUED | OUTPATIENT
Start: 2020-03-16 | End: 2020-03-16

## 2020-03-16 RX ORDER — CHLORHEXIDINE GLUCONATE 213 G/1000ML
1 SOLUTION TOPICAL
Refills: 0 | Status: DISCONTINUED | OUTPATIENT
Start: 2020-03-16 | End: 2020-03-23

## 2020-03-16 RX ORDER — DEXTROSE 50 % IN WATER 50 %
25 SYRINGE (ML) INTRAVENOUS ONCE
Refills: 0 | Status: DISCONTINUED | OUTPATIENT
Start: 2020-03-16 | End: 2020-03-23

## 2020-03-16 RX ORDER — LISINOPRIL 2.5 MG/1
40 TABLET ORAL DAILY
Refills: 0 | Status: DISCONTINUED | OUTPATIENT
Start: 2020-03-16 | End: 2020-03-23

## 2020-03-16 RX ORDER — ATORVASTATIN CALCIUM 80 MG/1
40 TABLET, FILM COATED ORAL AT BEDTIME
Refills: 0 | Status: DISCONTINUED | OUTPATIENT
Start: 2020-03-16 | End: 2020-03-23

## 2020-03-16 RX ORDER — HEPARIN SODIUM 5000 [USP'U]/ML
5000 INJECTION INTRAVENOUS; SUBCUTANEOUS EVERY 12 HOURS
Refills: 0 | Status: DISCONTINUED | OUTPATIENT
Start: 2020-03-16 | End: 2020-03-23

## 2020-03-16 RX ORDER — INSULIN LISPRO 100/ML
VIAL (ML) SUBCUTANEOUS
Refills: 0 | Status: DISCONTINUED | OUTPATIENT
Start: 2020-03-16 | End: 2020-03-23

## 2020-03-16 RX ORDER — HYDROMORPHONE HYDROCHLORIDE 2 MG/ML
0.5 INJECTION INTRAMUSCULAR; INTRAVENOUS; SUBCUTANEOUS
Refills: 0 | Status: DISCONTINUED | OUTPATIENT
Start: 2020-03-16 | End: 2020-03-16

## 2020-03-16 RX ORDER — SODIUM CHLORIDE 9 MG/ML
1000 INJECTION, SOLUTION INTRAVENOUS
Refills: 0 | Status: DISCONTINUED | OUTPATIENT
Start: 2020-03-16 | End: 2020-03-23

## 2020-03-16 RX ORDER — PANTOPRAZOLE SODIUM 20 MG/1
40 TABLET, DELAYED RELEASE ORAL
Refills: 0 | Status: DISCONTINUED | OUTPATIENT
Start: 2020-03-16 | End: 2020-03-23

## 2020-03-16 RX ORDER — INSULIN LISPRO 100/ML
8 VIAL (ML) SUBCUTANEOUS
Refills: 0 | Status: DISCONTINUED | OUTPATIENT
Start: 2020-03-16 | End: 2020-03-23

## 2020-03-16 RX ORDER — DEXTROSE 50 % IN WATER 50 %
15 SYRINGE (ML) INTRAVENOUS ONCE
Refills: 0 | Status: DISCONTINUED | OUTPATIENT
Start: 2020-03-16 | End: 2020-03-23

## 2020-03-16 RX ORDER — ASPIRIN/CALCIUM CARB/MAGNESIUM 324 MG
81 TABLET ORAL DAILY
Refills: 0 | Status: DISCONTINUED | OUTPATIENT
Start: 2020-03-16 | End: 2020-03-23

## 2020-03-16 RX ORDER — GLUCAGON INJECTION, SOLUTION 0.5 MG/.1ML
1 INJECTION, SOLUTION SUBCUTANEOUS ONCE
Refills: 0 | Status: DISCONTINUED | OUTPATIENT
Start: 2020-03-16 | End: 2020-03-23

## 2020-03-16 RX ORDER — INSULIN GLARGINE 100 [IU]/ML
33 INJECTION, SOLUTION SUBCUTANEOUS EVERY MORNING
Refills: 0 | Status: DISCONTINUED | OUTPATIENT
Start: 2020-03-16 | End: 2020-03-23

## 2020-03-16 RX ORDER — SODIUM CHLORIDE 9 MG/ML
1000 INJECTION, SOLUTION INTRAVENOUS
Refills: 0 | Status: DISCONTINUED | OUTPATIENT
Start: 2020-03-16 | End: 2020-03-16

## 2020-03-16 RX ADMIN — HEPARIN SODIUM 5000 UNIT(S): 5000 INJECTION INTRAVENOUS; SUBCUTANEOUS at 05:26

## 2020-03-16 RX ADMIN — Medication 8 UNIT(S): at 16:50

## 2020-03-16 RX ADMIN — HEPARIN SODIUM 5000 UNIT(S): 5000 INJECTION INTRAVENOUS; SUBCUTANEOUS at 17:28

## 2020-03-16 RX ADMIN — Medication 81 MILLIGRAM(S): at 14:22

## 2020-03-16 RX ADMIN — Medication 2: at 14:38

## 2020-03-16 RX ADMIN — LISINOPRIL 40 MILLIGRAM(S): 2.5 TABLET ORAL at 05:26

## 2020-03-16 RX ADMIN — ATORVASTATIN CALCIUM 40 MILLIGRAM(S): 80 TABLET, FILM COATED ORAL at 21:41

## 2020-03-16 RX ADMIN — PANTOPRAZOLE SODIUM 40 MILLIGRAM(S): 20 TABLET, DELAYED RELEASE ORAL at 05:26

## 2020-03-16 RX ADMIN — INSULIN GLARGINE 33 UNIT(S): 100 INJECTION, SOLUTION SUBCUTANEOUS at 08:35

## 2020-03-16 RX ADMIN — Medication 3: at 16:50

## 2020-03-16 RX ADMIN — Medication 60 MILLIGRAM(S): at 05:26

## 2020-03-16 NOTE — PROGRESS NOTE ADULT - ASSESSMENT
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    IMPRESSION  #Osteomyelitis of 5th right metatarsal; possible osteomyelitis of 3rd right metatarsal, so systemic symptoms.     Hx enterobacter & stenotrophomonas 12/2019   Sedimentation Rate, Erythrocyte: 54 mm/Hr (03.13.20 @ 07:36)    BCx 3/13 NG  #Sepsis ruled out on admission     RECOMMENDATIONS  - Withhold antibiotics until bone cultures are resulted for organism and sensitivity  - Podiatry following

## 2020-03-16 NOTE — PROGRESS NOTE ADULT - ATTENDING COMMENTS
69 year-old male with PMH of insulin-dependent DM c/b diabetic foot ulcers s/p amputations of half of left great toe and entire right great toe, PVD, HTN, HLD, GERD, depression, recently amputated right 3rd toe (Dec 4th) right 4th toe (Dec 11) s/p debridement on Dec 11th presented to the Ed for right foot discharge since yesterday.     # Diabetic foot Ulcer: Right foot  #Osteomyelitis  -no evidence of sepsis on admission  -s/p meropenem and bactrim - ID wants to hold off Abx for now pending Dbx.   -XR R foot: OM w 5th metatarsal; questionable 3rd metatarsal involvement  - Arterial Duplex R: minimal stenosis of tibial artery  - Venous Duplex BL: no DVT  - Vascular on board: no intervention  - Podiatry: Had Dbx 3/16. Awaiting Cxs.  - fu blood cultures  - esr 54/ crp pend    # Insulin-dependent T2DM  -Takes Humalin 30 units at breakfast, Trulicity 0.5 weekly, and Janumet  BID outpatient  -cw basal bolus insulin at this time  -Monitor fingersticks with meals and at bedtime    # Hypertension  -Takes Benazepril 40 mg qD at home  -C/w Lisinopril 40 mg qD for now  -Monitor BP    # Hyperlipidemia  -Takes Crestor 10 mg qHS at home  -C/w Atorvastatin 40 mg qHS for now    # GERD  -cw PPI    # Depression  -not on any med  -fu outpt psych    DVT ppx: Heparin   GI ppx: Protonix  Diet: DASH/TLC consistent carb.   Activity: OOBTC  Code status: FULL CODE  Dispo: Med-surg. From home  pending bone cultures.

## 2020-03-16 NOTE — BRIEF OPERATIVE NOTE - NSICDXBRIEFPOSTOP_GEN_ALL_CORE_FT
POST-OP DIAGNOSIS:  Cellulitis of foot 16-Mar-2020 13:08:55  Royal Bhatia  Osteomyelitis 16-Mar-2020 13:08:23  Royal Bhatia

## 2020-03-16 NOTE — BRIEF OPERATIVE NOTE - NSICDXBRIEFPREOP_GEN_ALL_CORE_FT
PRE-OP DIAGNOSIS:  Cellulitis in diabetic foot 16-Mar-2020 13:08:09  Royal Bhatia  Osteomyelitis 16-Mar-2020 13:07:51  Royal Bhatia

## 2020-03-16 NOTE — PROGRESS NOTE ADULT - SUBJECTIVE AND OBJECTIVE BOX
SUBJECTIVE:    Patient is a 70y old Male who presents with a chief complaint of Diabetic foot ulcer (16 Mar 2020 09:03)    Overnight Events: Patient is stable, no acute events overnight.  VS are stable, he has no complaints.     PAST MEDICAL & SURGICAL HISTORY  GERD (gastroesophageal reflux disease)  Depression  Diabetic foot ulcer  Dyslipidemia  HTN (hypertension)  DM (diabetes mellitus): 12/27/18 hgb aic  11.2  Toe amputation status, right: (2008)    SOCIAL HISTORY:  Negative for smoking/alcohol/drug use.     ALLERGIES:  No Known Allergies    MEDICATIONS:  STANDING MEDICATIONS  aspirin enteric coated 81 milliGRAM(s) Oral daily  atorvastatin 40 milliGRAM(s) Oral at bedtime  chlorhexidine 4% Liquid 1 Application(s) Topical <User Schedule>  dextrose 5%. 1000 milliLiter(s) IV Continuous <Continuous>  dextrose 50% Injectable 12.5 Gram(s) IV Push once  dextrose 50% Injectable 25 Gram(s) IV Push once  dextrose 50% Injectable 25 Gram(s) IV Push once  heparin  Injectable 5000 Unit(s) SubCutaneous every 12 hours  insulin glargine Injectable (LANTUS) 33 Unit(s) SubCutaneous every morning  insulin lispro (HumaLOG) corrective regimen sliding scale   SubCutaneous three times a day before meals  insulin lispro Injectable (HumaLOG) 8 Unit(s) SubCutaneous three times a day before meals  lisinopril 40 milliGRAM(s) Oral daily  NIFEdipine XL 60 milliGRAM(s) Oral daily  pantoprazole    Tablet 40 milliGRAM(s) Oral before breakfast    PRN MEDICATIONS  dextrose 40% Gel 15 Gram(s) Oral once PRN  glucagon  Injectable 1 milliGRAM(s) IntraMuscular once PRN    VITALS:   T(F): 97.6, Max: 97.8 (03-15-20 @ 21:18)  HR: 73 (73 - 80)  BP: 148/68 (108/58 - 148/68)  RR: 18 (18 - 18)  SpO2: --    LABS:                        9.8    5.21  )-----------( 300      ( 16 Mar 2020 06:28 )             30.3     03-16    137  |  105  |  24<H>  ----------------------------<  275<H>  4.6   |  24  |  1.0    Ca    8.5      16 Mar 2020 06:28  Mg     1.9     03-16    TPro  6.1  /  Alb  3.2<L>  /  TBili  0.2  /  DBili  x   /  AST  9   /  ALT  12  /  AlkPhos  83  03-15    PT/INR - ( 16 Mar 2020 06:28 )   PT: 11.60 sec;   INR: 1.01 ratio         PTT - ( 16 Mar 2020 06:28 )  PTT:32.1 sec                      IMAGING/EKG:  < from: VA Duplex Lower Ext Vein Scan, Bilat (03.12.20 @ 20:18) >  Impression:    No evidence of deep venous thrombosis or superficial thrombophlebitis in the bilateral lower extremities.    < end of copied text >    PHYSICAL EXAM:  GEN: NAD, comfortable  LUNGS: CTAB, no w/r/r  HEART: RRR, s1 and s2 appreciated, no m/r/g  ABD: soft, NT/ND, +BS  EXT: no edema, PP b/l  NEURO: AAOX3

## 2020-03-16 NOTE — BRIEF OPERATIVE NOTE - NSICDXBRIEFPROCEDURE_GEN_ALL_CORE_FT
PROCEDURES:  Pulsed irrigation of wound 16-Mar-2020 13:07:19  Royal Bhatia  Biopsy, bone, toe, open 16-Mar-2020 13:06:54  Royal Bhatia  Resection, bone, metatarsal 16-Mar-2020 13:05:45  Royal Bhatia

## 2020-03-16 NOTE — PROGRESS NOTE ADULT - ASSESSMENT
69 year-old male with PMH of insulin-dependent DM c/b diabetic foot ulcers s/p amputations of half of left great toe and entire right great toe, PVD, HTN, HLD, GERD, depression, recently amputated right 3rd toe (Dec 4th) right 4th toe (Dec 11) s/p debridement on Dec 11th presented to the Ed for right foot discharge since yesterday.     # Diabetic foot Ulcer: Right foot  -no evidence of sepsis on admission  -ID eval: will withold antibiotics until bone cultures resulted  -XR R foot: OM w 5th metatarsal; questionable 3rd metatarsal involvement  - Arterial Duplex R: minimal stenosis of tibial artery  - Venous Duplex BL: no DVT  - Vascular on board: no intervention  - Podiatry: plan for OR for debridement and wound washout today  - blood cultures - negative   - esr 54/ crp 7.31    # Insulin-dependent T2DM  -Takes Humalin 30 units at breakfast, Trulicity 0.5 weekly, and Janumet  BID outpatient  -cw basal bolus insulin at this time  -Monitor fingersticks with meals and at bedtime    # Hypertension  -Takes Benazepril 40 mg qD at home  -C/w Lisinopril 40 mg qD for now  -Monitor BP    # Hyperlipidemia  -Takes Crestor 10 mg qHS at home  -C/w Atorvastatin 40 mg qHS for now    # GERD  -cw PPI    # Depression  -not on any med  -fu outpt psych    DVT ppx: Heparin   GI ppx: Protonix  Diet: DASH/TLC consistent carb  Activity: OOBTC  Code status: FULL CODE  Dispo: From home, pending bone culture

## 2020-03-16 NOTE — PROGRESS NOTE ADULT - SUBJECTIVE AND OBJECTIVE BOX
JOSÉ MANUEL PORTER  70y, Male    All available historical data reviewed    OVERNIGHT EVENTS:    no fevers  no complaints  ROS:  General: Denies rigors, night sweats  HEENT: Denies headache, rhinorrhea, sore throat, eye pain  CV: Denies CP, palpitations  PULM: Denies wheezing, hemoptysis  GI: Denies hematemesis, hematochezia, melena  : Denies discharge, hematuria  MSK: Denies arthralgias, myalgias  SKIN: Denies rash, lesions  NEURO: Denies paresthesias, weakness  PSYCH: Denies depression, anxiety    VITALS:  T(F): 97.6, Max: 97.8 (03-15-20 @ 21:18)  HR: 73  BP: 148/68  RR: 18Vital Signs Last 24 Hrs  T(C): 36.4 (16 Mar 2020 06:27), Max: 36.6 (15 Mar 2020 21:18)  T(F): 97.6 (16 Mar 2020 06:27), Max: 97.8 (15 Mar 2020 21:18)  HR: 73 (16 Mar 2020 06:27) (73 - 80)  BP: 148/68 (16 Mar 2020 06:27) (108/58 - 148/68)  BP(mean): --  RR: 18 (16 Mar 2020 06:27) (18 - 18)  SpO2: --    TESTS & MEASUREMENTS:                        9.8    5.21  )-----------( 300      ( 16 Mar 2020 06:28 )             30.3     03-16    137  |  105  |  24<H>  ----------------------------<  275<H>  4.6   |  24  |  1.0    Ca    8.5      16 Mar 2020 06:28  Mg     1.9     03-16    TPro  6.1  /  Alb  3.2<L>  /  TBili  0.2  /  DBili  x   /  AST  9   /  ALT  12  /  AlkPhos  83  03-15    LIVER FUNCTIONS - ( 15 Mar 2020 07:28 )  Alb: 3.2 g/dL / Pro: 6.1 g/dL / ALK PHOS: 83 U/L / ALT: 12 U/L / AST: 9 U/L / GGT: x             Culture - Blood (collected 03-13-20 @ 07:36)  Source: .Blood None  Preliminary Report (03-14-20 @ 14:01):    No growth to date.            RADIOLOGY & ADDITIONAL TESTS:  Personal review of radiological diagnostics performed  Echo and EKG results noted when applicable.     MEDICATIONS:  aspirin enteric coated 81 milliGRAM(s) Oral daily  atorvastatin 40 milliGRAM(s) Oral at bedtime  chlorhexidine 4% Liquid 1 Application(s) Topical <User Schedule>  dextrose 40% Gel 15 Gram(s) Oral once PRN  dextrose 5%. 1000 milliLiter(s) IV Continuous <Continuous>  dextrose 50% Injectable 12.5 Gram(s) IV Push once  dextrose 50% Injectable 25 Gram(s) IV Push once  dextrose 50% Injectable 25 Gram(s) IV Push once  glucagon  Injectable 1 milliGRAM(s) IntraMuscular once PRN  heparin  Injectable 5000 Unit(s) SubCutaneous every 12 hours  insulin glargine Injectable (LANTUS) 33 Unit(s) SubCutaneous every morning  insulin lispro (HumaLOG) corrective regimen sliding scale   SubCutaneous three times a day before meals  insulin lispro Injectable (HumaLOG) 8 Unit(s) SubCutaneous three times a day before meals  lisinopril 40 milliGRAM(s) Oral daily  NIFEdipine XL 60 milliGRAM(s) Oral daily  pantoprazole    Tablet 40 milliGRAM(s) Oral before breakfast      ANTIBIOTICS:

## 2020-03-17 LAB
ANION GAP SERPL CALC-SCNC: 10 MMOL/L — SIGNIFICANT CHANGE UP (ref 7–14)
BASOPHILS # BLD AUTO: 0.02 K/UL — SIGNIFICANT CHANGE UP (ref 0–0.2)
BASOPHILS NFR BLD AUTO: 0.2 % — SIGNIFICANT CHANGE UP (ref 0–1)
BUN SERPL-MCNC: 22 MG/DL — HIGH (ref 10–20)
CALCIUM SERPL-MCNC: 8.6 MG/DL — SIGNIFICANT CHANGE UP (ref 8.5–10.1)
CHLORIDE SERPL-SCNC: 104 MMOL/L — SIGNIFICANT CHANGE UP (ref 98–110)
CO2 SERPL-SCNC: 26 MMOL/L — SIGNIFICANT CHANGE UP (ref 17–32)
CREAT SERPL-MCNC: 1 MG/DL — SIGNIFICANT CHANGE UP (ref 0.7–1.5)
EOSINOPHIL # BLD AUTO: 0.09 K/UL — SIGNIFICANT CHANGE UP (ref 0–0.7)
EOSINOPHIL NFR BLD AUTO: 1.1 % — SIGNIFICANT CHANGE UP (ref 0–8)
GLUCOSE BLDC GLUCOMTR-MCNC: 115 MG/DL — HIGH (ref 70–99)
GLUCOSE BLDC GLUCOMTR-MCNC: 129 MG/DL — HIGH (ref 70–99)
GLUCOSE BLDC GLUCOMTR-MCNC: 140 MG/DL — HIGH (ref 70–99)
GLUCOSE BLDC GLUCOMTR-MCNC: 182 MG/DL — HIGH (ref 70–99)
GLUCOSE SERPL-MCNC: 197 MG/DL — HIGH (ref 70–99)
HCT VFR BLD CALC: 30 % — LOW (ref 42–52)
HGB BLD-MCNC: 9.8 G/DL — LOW (ref 14–18)
IMM GRANULOCYTES NFR BLD AUTO: 0.6 % — HIGH (ref 0.1–0.3)
LYMPHOCYTES # BLD AUTO: 0.89 K/UL — LOW (ref 1.2–3.4)
LYMPHOCYTES # BLD AUTO: 10.6 % — LOW (ref 20.5–51.1)
MCHC RBC-ENTMCNC: 29.3 PG — SIGNIFICANT CHANGE UP (ref 27–31)
MCHC RBC-ENTMCNC: 32.7 G/DL — SIGNIFICANT CHANGE UP (ref 32–37)
MCV RBC AUTO: 89.8 FL — SIGNIFICANT CHANGE UP (ref 80–94)
MONOCYTES # BLD AUTO: 0.7 K/UL — HIGH (ref 0.1–0.6)
MONOCYTES NFR BLD AUTO: 8.3 % — SIGNIFICANT CHANGE UP (ref 1.7–9.3)
NEUTROPHILS # BLD AUTO: 6.64 K/UL — HIGH (ref 1.4–6.5)
NEUTROPHILS NFR BLD AUTO: 79.2 % — HIGH (ref 42.2–75.2)
NRBC # BLD: 0 /100 WBCS — SIGNIFICANT CHANGE UP (ref 0–0)
PLATELET # BLD AUTO: 290 K/UL — SIGNIFICANT CHANGE UP (ref 130–400)
POTASSIUM SERPL-MCNC: 4.6 MMOL/L — SIGNIFICANT CHANGE UP (ref 3.5–5)
POTASSIUM SERPL-SCNC: 4.6 MMOL/L — SIGNIFICANT CHANGE UP (ref 3.5–5)
RBC # BLD: 3.34 M/UL — LOW (ref 4.7–6.1)
RBC # FLD: 14.6 % — HIGH (ref 11.5–14.5)
SODIUM SERPL-SCNC: 140 MMOL/L — SIGNIFICANT CHANGE UP (ref 135–146)
WBC # BLD: 8.39 K/UL — SIGNIFICANT CHANGE UP (ref 4.8–10.8)
WBC # FLD AUTO: 8.39 K/UL — SIGNIFICANT CHANGE UP (ref 4.8–10.8)

## 2020-03-17 PROCEDURE — 99232 SBSQ HOSP IP/OBS MODERATE 35: CPT | Mod: GC

## 2020-03-17 RX ORDER — MEROPENEM 1 G/30ML
1000 INJECTION INTRAVENOUS EVERY 8 HOURS
Refills: 0 | Status: DISCONTINUED | OUTPATIENT
Start: 2020-03-17 | End: 2020-03-18

## 2020-03-17 RX ADMIN — Medication 8 UNIT(S): at 17:22

## 2020-03-17 RX ADMIN — MEROPENEM 100 MILLIGRAM(S): 1 INJECTION INTRAVENOUS at 15:13

## 2020-03-17 RX ADMIN — MEROPENEM 100 MILLIGRAM(S): 1 INJECTION INTRAVENOUS at 21:34

## 2020-03-17 RX ADMIN — INSULIN GLARGINE 33 UNIT(S): 100 INJECTION, SOLUTION SUBCUTANEOUS at 08:44

## 2020-03-17 RX ADMIN — Medication 8 UNIT(S): at 08:17

## 2020-03-17 RX ADMIN — HEPARIN SODIUM 5000 UNIT(S): 5000 INJECTION INTRAVENOUS; SUBCUTANEOUS at 05:34

## 2020-03-17 RX ADMIN — LISINOPRIL 40 MILLIGRAM(S): 2.5 TABLET ORAL at 05:33

## 2020-03-17 RX ADMIN — HEPARIN SODIUM 5000 UNIT(S): 5000 INJECTION INTRAVENOUS; SUBCUTANEOUS at 17:22

## 2020-03-17 RX ADMIN — ATORVASTATIN CALCIUM 40 MILLIGRAM(S): 80 TABLET, FILM COATED ORAL at 21:34

## 2020-03-17 RX ADMIN — Medication 8 UNIT(S): at 12:00

## 2020-03-17 RX ADMIN — Medication 1: at 08:16

## 2020-03-17 RX ADMIN — PANTOPRAZOLE SODIUM 40 MILLIGRAM(S): 20 TABLET, DELAYED RELEASE ORAL at 05:33

## 2020-03-17 RX ADMIN — Medication 81 MILLIGRAM(S): at 11:59

## 2020-03-17 RX ADMIN — CHLORHEXIDINE GLUCONATE 1 APPLICATION(S): 213 SOLUTION TOPICAL at 05:34

## 2020-03-17 RX ADMIN — Medication 3: at 11:59

## 2020-03-17 RX ADMIN — Medication 60 MILLIGRAM(S): at 05:33

## 2020-03-17 NOTE — PROGRESS NOTE ADULT - SUBJECTIVE AND OBJECTIVE BOX
SUBJECTIVE:    Patient is a 70y old Male who presents with a chief complaint of Diabetic foot ulcer (17 Mar 2020 09:37)    Overnight Events: Patient is stable, no acute events overnight.  He went for surgery yesterday with podiatry, no complications.  VS are stable, no major complaints.    PAST MEDICAL & SURGICAL HISTORY  GERD (gastroesophageal reflux disease)  Depression  Diabetic foot ulcer  Dyslipidemia  HTN (hypertension)  DM (diabetes mellitus): 12/27/18 hgb aic  11.2  Toe amputation status, right: (2008)    SOCIAL HISTORY:  Negative for smoking/alcohol/drug use.     ALLERGIES:  No Known Allergies    MEDICATIONS:  STANDING MEDICATIONS  aspirin enteric coated 81 milliGRAM(s) Oral daily  atorvastatin 40 milliGRAM(s) Oral at bedtime  chlorhexidine 4% Liquid 1 Application(s) Topical <User Schedule>  dextrose 5%. 1000 milliLiter(s) IV Continuous <Continuous>  dextrose 50% Injectable 12.5 Gram(s) IV Push once  dextrose 50% Injectable 25 Gram(s) IV Push once  dextrose 50% Injectable 25 Gram(s) IV Push once  heparin  Injectable 5000 Unit(s) SubCutaneous every 12 hours  insulin glargine Injectable (LANTUS) 33 Unit(s) SubCutaneous every morning  insulin lispro (HumaLOG) corrective regimen sliding scale   SubCutaneous three times a day before meals  insulin lispro Injectable (HumaLOG) 8 Unit(s) SubCutaneous three times a day before meals  lisinopril 40 milliGRAM(s) Oral daily  meropenem  IVPB 1000 milliGRAM(s) IV Intermittent every 8 hours  NIFEdipine XL 60 milliGRAM(s) Oral daily  pantoprazole    Tablet 40 milliGRAM(s) Oral before breakfast    PRN MEDICATIONS  dextrose 40% Gel 15 Gram(s) Oral once PRN  glucagon  Injectable 1 milliGRAM(s) IntraMuscular once PRN    VITALS:   T(F): 98.1, Max: 98.3 (03-16-20 @ 20:02)  HR: 77 (59 - 94)  BP: 137/66 (117/58 - 159/73)  RR: 18 (18 - 19)  SpO2: 99% (99% - 99%)    LABS:                        9.8    8.39  )-----------( 290      ( 17 Mar 2020 07:13 )             30.0     03-17    140  |  104  |  22<H>  ----------------------------<  197<H>  4.6   |  26  |  1.0    Ca    8.6      17 Mar 2020 07:13  Mg     1.9     03-16      PT/INR - ( 16 Mar 2020 06:28 )   PT: 11.60 sec;   INR: 1.01 ratio         PTT - ( 16 Mar 2020 06:28 )  PTT:32.1 sec            PHYSICAL EXAM:  GEN: NAD, comfortable  LUNGS: CTAB, no w/r/r  HEART: RRR, s1 and s2 appreciated, no m/r/g  ABD: soft, NT/ND, +BS  EXT: no edema  NEURO: AAOX3

## 2020-03-17 NOTE — PROGRESS NOTE ADULT - ASSESSMENT
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    IMPRESSION  #Osteomyelitis of 5th right metatarsal; possible osteomyelitis of 3rd right metatarsal, so systemic symptoms.     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    IMPRESSION  #Osteomyelitis of 5th right metatarsal; possible osteomyelitis of 3rd right metatarsal, so systemic symptoms.     s/p Pulsed irrigation of wound 16-Mar-2020 Biopsy, bone, toe, open 16-Mar-2020 13:06:54 Resection, bone, metatarsal     Per Pod 5th met healthy bone, 3rd met-- pending biopsy, soft bone     Hx enterobacter & stenotrophomonas 12/2019   Sedimentation Rate, Erythrocyte: 54 mm/Hr (03.13.20 @ 07:36) C-Reactive Protein, Serum: 7.31 mg/dL (03.13.20 @ 07:36)    BCx 3/13 NG  #Sepsis ruled out on admission     RECOMMENDATIONS  - toni 1g q8h iv   - f/u OR cultures , path   - Podiatry following  - like plan for midline x ertapenem 1g q24h IV x 14 days and if path of 3rd Met is + will prolong another 14 days then PO.   - Weekly CBC, BMP  - ID follow-up with Tues Dr. Gary Turpin 3/24 11AM      [address]       [phone] (call to make an appointment, walk-ins Tuesdays 10:30 AM)      Fax [phone]

## 2020-03-17 NOTE — PROGRESS NOTE ADULT - SUBJECTIVE AND OBJECTIVE BOX
CHARLOTTEJOSÉ MANUEL  70y, Male  Allergy: No Known Allergies      LOS  5d    CHIEF COMPLAINT: Diabetic foot ulcer (17 Mar 2020 10:35)      INTERVAL EVENTS/HPI  - No acute events overnight  - T(F): , Max: 98.3 (03-16-20 @ 20:02)  - Denies any worsening symptoms  - Tolerating medication  - WBC Count: 8.39 (03-17-20 @ 07:13)  WBC Count: 5.21 (03-16-20 @ 06:28)  - Creatinine, Serum: 1.0 (03-17-20 @ 07:13)  Creatinine, Serum: 1.0 (03-16-20 @ 06:28)       ROS  General: Denies rigors, nightsweats  HEENT: Denies headache, rhinorrhea, sore throat, eye pain  CV: Denies CP, palpitations  PULM: Denies wheezing, hemoptysis  GI: Denies hematemesis, hematochezia, melena  : Denies discharge, hematuria  MSK: Denies arthralgias, myalgias  SKIN: Denies rash, lesions  NEURO: Denies paresthesias, weakness  PSYCH: Denies depression, anxiety    VITALS:  T(F): 97.6, Max: 98.3 (03-16-20 @ 20:02)  HR: 83  BP: 117/61  RR: 18Vital Signs Last 24 Hrs  T(C): 36.4 (17 Mar 2020 12:14), Max: 36.8 (16 Mar 2020 13:15)  T(F): 97.6 (17 Mar 2020 12:14), Max: 98.3 (16 Mar 2020 20:02)  HR: 83 (17 Mar 2020 12:14) (59 - 94)  BP: 117/61 (17 Mar 2020 12:14) (117/58 - 159/73)  BP(mean): --  RR: 18 (17 Mar 2020 12:14) (18 - 19)  SpO2: 99% (16 Mar 2020 14:15) (99% - 99%)    PHYSICAL EXAM:  Gen: NAD, resting in bed  HEENT: Normocephalic, atraumatic  Neck: supple, no lymphadenopathy  CV: Regular rate & regular rhythm  Lungs: decreased BS at bases, no fremitus  Abdomen: Soft, BS present  Ext: Warm, well perfused, R foot stump clean stitches  Neuro: non focal, awake  Skin: no rash, no erythema  Lines: no phlebitis    FH: Non-contributory  Social Hx: Non-contributory    TESTS & MEASUREMENTS:                        9.8    8.39  )-----------( 290      ( 17 Mar 2020 07:13 )             30.0     03-17    140  |  104  |  22<H>  ----------------------------<  197<H>  4.6   |  26  |  1.0    Ca    8.6      17 Mar 2020 07:13  Mg     1.9     03-16      eGFR if Non African American: 75 mL/min/1.73M2 (03-17-20 @ 07:13)  eGFR if : 87 mL/min/1.73M2 (03-17-20 @ 07:13)          Culture - Blood (collected 03-13-20 @ 07:36)  Source: .Blood None  Preliminary Report (03-14-20 @ 14:01):    No growth to date.            INFECTIOUS DISEASES TESTING      RADIOLOGY & ADDITIONAL TESTS:  I have personally reviewed the last available Chest xray  CXR      CT      CARDIOLOGY TESTING      MEDICATIONS  aspirin enteric coated 81  atorvastatin 40  chlorhexidine 4% Liquid 1  dextrose 5%. 1000  dextrose 50% Injectable 12.5  dextrose 50% Injectable 25  dextrose 50% Injectable 25  heparin  Injectable 5000  insulin glargine Injectable (LANTUS) 33  insulin lispro (HumaLOG) corrective regimen sliding scale   insulin lispro Injectable (HumaLOG) 8  lisinopril 40  meropenem  IVPB 1000  NIFEdipine XL 60  pantoprazole    Tablet 40      WEIGHT  Weight (kg): 92.1 (03-16-20 @ 11:01)  Creatinine, Serum: 1.0 mg/dL (03-17-20 @ 07:13)      ANTIBIOTICS:  meropenem  IVPB 1000 milliGRAM(s) IV Intermittent every 8 hours      All available historical records have been reviewed

## 2020-03-17 NOTE — PROGRESS NOTE ADULT - ATTENDING COMMENTS
69 year-old male with PMH of insulin-dependent DM c/b diabetic foot ulcers s/p amputations of half of left great toe and entire right great toe, PVD, HTN, HLD, GERD, depression, recently amputated right 3rd toe (Dec 4th) right 4th toe (Dec 11) s/p debridement on Dec 11th presented to the Ed for right foot discharge since yesterday.     # Diabetic foot Ulcer: Right foot  #Osteomyelitis  -no evidence of sepsis on admission  -s/p meropenem and bactrim - ID wants to hold off Abx for now pending Dbx.   -XR R foot: OM w 5th metatarsal; questionable 3rd metatarsal involvement  - Arterial Duplex R: minimal stenosis of tibial artery  - Venous Duplex BL: no DVT  - Vascular on board: no intervention  - Podiatry: Had Dbx 3/16. Awaiting Cxs. IV meropenem interim.  - fu blood cultures  - esr 54/ crp pend    # Insulin-dependent T2DM  -Takes Humalin 30 units at breakfast, Trulicity 0.5 weekly, and Janumet  BID outpatient  -cw basal bolus insulin at this time  -Monitor fingersticks with meals and at bedtime    # Hypertension  -Takes Benazepril 40 mg qD at home  -C/w Lisinopril 40 mg qD for now  -Monitor BP    # Hyperlipidemia  -Takes Crestor 10 mg qHS at home  -C/w Atorvastatin 40 mg qHS for now    # GERD  -cw PPI    # Depression  -not on any med  -fu outpt psych    DVT ppx: Heparin   GI ppx: Protonix  Diet: DASH/TLC consistent carb.   Activity: OOBTC  Code status: FULL CODE  Dispo: Med-surg. From home  pending bone cultures.

## 2020-03-17 NOTE — PROGRESS NOTE ADULT - ATTENDING COMMENTS
surgical site looks well reapproximated   local dressing with betadine and gauze dressing  would benefit from vising nurse q48h due to history of poor compliance   will discuss w/ ID regarding antibiotics

## 2020-03-17 NOTE — PROGRESS NOTE ADULT - SUBJECTIVE AND OBJECTIVE BOX
PODIATRY PROGRESS NOTE   [id number] JOSÉ MANUEL PORTER is a pleasant well-nourished, well developed 70y Male in no acute distress, alert awake, and oriented to person, place and time.   Patient is a 70y old  Male who presents with a chief complaint of Diabetic foot ulcer (16 Mar 2020 10:40)    HPI:  69 year-old male with PMH of insulin-dependent DM c/b diabetic foot ulcers s/p amputations of half of left great toe and entire right great toe, PVD, HTN, HLD, GERD, depression, recently amputated right 3rd toe (Dec 4th) right 4th toe (Dec 11) s/p debridement on Dec 11th presented to the Ed for right foot discharge since yesterday.     As per the pt, he has been having daily dressing changes since Dec but since yesterday he started noticing slight discharge since last night, had an appointment with podiatry this mornign and was advised to come to the hospital for further evaluation and possible debridement. Pt denies any active complaints. Denies fever, shortness of breath, cough, rash on the body, recent travel, sick contact, nausea, vomiting, diarrhea, constipation.    In the ED, pt is hemodynamically stable. xray right foot done, not read yet. (12 Mar 2020 19:00)    pt seen and assessed am rounds at bedside.  pt dressing clean dry intact.  s/p partial 5th met head resection with bone biopsy 3rd met head right foot 3/16    Vital Signs Last 24 Hrs  T(C): 36.7 (17 Mar 2020 06:03), Max: 36.8 (16 Mar 2020 13:15)  T(F): 98.1 (17 Mar 2020 06:03), Max: 98.3 (16 Mar 2020 20:02)  HR: 77 (17 Mar 2020 06:03) (59 - 94)  BP: 137/66 (17 Mar 2020 06:03) (117/58 - 159/73)  BP(mean): --  RR: 18 (17 Mar 2020 06:03) (18 - 19)  SpO2: 99% (16 Mar 2020 14:15) (99% - 99%)                        9.8    8.39  )-----------( 290      ( 17 Mar 2020 07:13 )             30.0                 03-17    140  |  104  |  22<H>  ----------------------------<  197<H>  4.6   |  26  |  1.0    Ca    8.6      17 Mar 2020 07:13  Mg     1.9     03-16    REVIEW OF SYSTEMS:    CONSTITUTIONAL: No weakness, fevers or chills  EYES/ENT: No visual changes;  No vertigo or throat pain   NECK: No pain or stiffness  RESPIRATORY: No cough, wheezing, hemoptysis; No shortness of breath  CARDIOVASCULAR: No chest pain or palpitations  GASTROINTESTINAL: No abdominal or epigastric pain. No nausea, vomiting, or hematemesis; No diarrhea or constipation. No melena or hematochezia.  GENITOURINARY: No dysuria, frequency or hematuria  NEUROLOGICAL: No numbness or weakness  SKIN: No itching, burning, rashes, or lesions   All other review of systems is negative unless indicated above.  < from: Xray Foot AP + Lateral, Right (03.12.20 @ 17:04) >    EXAM:  XR FOOT 2 VIEWS RT            PROCEDURE DATE:  03/12/2020            INTERPRETATION:  Clinical history:Osteomyelitis, infection, pain  Technique: XR FOOT 2 VIEWS RIGHT  Comparison: 12/3/2019    Findings:    In the interim, there is transmetatarsal amputation of all toes. There are erosive changes involving the lateral aspect of the fifth metatarsal shaft, distal 3 cm. Questionable erosive tract is noted involving the distal aspect of the third metatarsal shaft. First, second and fourth metatarsal stumps are appropriate in appearance. There are midfoot degenerative changes. Multiple staples are noted there is extensive atherosclerosis.    Impression:    Findings are compatible with osteomyelitis involving fifth metatarsal shaft. Questionable involvement of the third metatarsal.        LUKE WING M.D., ATTENDING RADIOLOGIST  This document has been electronically signed. Mar 12 2020 10:20PM        < end of copied text >    < from: Xray Foot AP + Lateral + Oblique, Right (03.16.20 @ 14:35) >    EXAM:  XR FOOT COMP MIN 3 VIEWS RT            PROCEDURE DATE:  03/16/2020            INTERPRETATION:  Clinical History / Reason for exam: Postoperative evaluation    Technique: 3 views the right foot    Comparison 3/12/2020    Findings/  impression: Since prior patient is status post partial fifth ray amputation sparing the 3 cm proximal fifth metatarsal base. Otherwise there are unchanged transmetatarsal amputations of first through fourth with surgical clips overlying the hallux metatarsal stump. Subcutaneous locules of emphysema are present at the postsurgical site. No proximal emphysematous change. Unchanged midfoot neuropathic osteoarthropathy and vascular calcification.      KATHY BENITEZ M.D., ATTENDING RADIOLOGIST  This document has been electronically signed. Mar 16 2020  3:46PM    < end of copied text >      O:   Derm: suture intact, no dehiscence at the surgical site. mild edema, no drainage.   superficial scab right foot  superficial scab left 2nd/3rd digit  Vascular: Dorsalis Pedis and Posterior Tibial pulses 0/4.  plantar flap CFT WNL, warm to touch right foot  Neuro: Protective sensation diminished to the level of the digits right foot  MSK: mild pain on palpation right foot  h/o of left hallux amp.         Assessment and Plan:   nonhealing TMA right lateral forefoot with pus  superficial scab left 2nd/3rd digit stable  s/p partial 5th met head resection with bone biopsy 3rd met head right foot 3/16    Chart reviewed and Patient evaluated  Discussed diagnosis and treatment with patient  Applied adaptic betadine dsd abd kerlix  local wound care: wash with soap and water, apply adaptic soak betadine dsd kerlix right foot  f/u sx cultures  post op xray right foot reviewed as above  f/u with ID for abx regimen  pt stable to dc from podiatry standpoint  wbat heel touch right foot  dispense darco shoe  f/u o/p in 1 week at [address]  f/u with attending    Spectra;      03-17-20 @ 09:38

## 2020-03-17 NOTE — PROGRESS NOTE ADULT - ASSESSMENT
69 year-old male with PMH of insulin-dependent DM c/b diabetic foot ulcers s/p amputations of half of left great toe and entire right great toe, PVD, HTN, HLD, GERD, depression, recently amputated right 3rd toe (Dec 4th) right 4th toe (Dec 11) s/p debridement on Dec 11th presented to the Ed for right foot discharge since yesterday.     # Diabetic foot Ulcer: Right foot  -no evidence of sepsis on admission  -XR R foot: OM w 5th metatarsal; questionable 3rd metatarsal involvement  -s/p OR for debridement and wound washout on 3/16  -ID eval: will withold antibiotics until bone cultures, started on meropenem on 3/17/20  - f/u bone cultures and adjust abx accordingly  - Arterial Duplex R: minimal stenosis of tibial artery  - Venous Duplex BL: no DVT  - Vascular on board: no intervention    - blood cultures - negative   - esr 54/ crp 7.31    # Insulin-dependent T2DM  -Takes Humalin 30 units at breakfast, Trulicity 0.5 weekly, and Janumet  BID outpatient  -cw basal bolus insulin at this time  -Monitor fingersticks with meals and at bedtime    # Hypertension  -Takes Benazepril 40 mg qD at home  -C/w Lisinopril 40 mg qD for now  -Monitor BP    # Hyperlipidemia  -Takes Crestor 10 mg qHS at home  -C/w Atorvastatin 40 mg qHS for now    # GERD  -cw PPI    # Depression  -not on any med  -fu outpt psych    DVT ppx: Heparin   GI ppx: Protonix  Diet: DASH/TLC consistent carb  Activity: OOBTC  Code status: FULL CODE  Dispo: From home, pending bone culture

## 2020-03-18 ENCOUNTER — TRANSCRIPTION ENCOUNTER (OUTPATIENT)
Age: 70
End: 2020-03-18

## 2020-03-18 LAB
ANION GAP SERPL CALC-SCNC: 13 MMOL/L — SIGNIFICANT CHANGE UP (ref 7–14)
BASOPHILS # BLD AUTO: 0.03 K/UL — SIGNIFICANT CHANGE UP (ref 0–0.2)
BASOPHILS NFR BLD AUTO: 0.5 % — SIGNIFICANT CHANGE UP (ref 0–1)
BUN SERPL-MCNC: 25 MG/DL — HIGH (ref 10–20)
CALCIUM SERPL-MCNC: 8.9 MG/DL — SIGNIFICANT CHANGE UP (ref 8.5–10.1)
CHLORIDE SERPL-SCNC: 104 MMOL/L — SIGNIFICANT CHANGE UP (ref 98–110)
CO2 SERPL-SCNC: 25 MMOL/L — SIGNIFICANT CHANGE UP (ref 17–32)
CREAT SERPL-MCNC: 1 MG/DL — SIGNIFICANT CHANGE UP (ref 0.7–1.5)
CULTURE RESULTS: SIGNIFICANT CHANGE UP
EOSINOPHIL # BLD AUTO: 0.19 K/UL — SIGNIFICANT CHANGE UP (ref 0–0.7)
EOSINOPHIL NFR BLD AUTO: 3.2 % — SIGNIFICANT CHANGE UP (ref 0–8)
GLUCOSE BLDC GLUCOMTR-MCNC: 121 MG/DL — HIGH (ref 70–99)
GLUCOSE BLDC GLUCOMTR-MCNC: 154 MG/DL — HIGH (ref 70–99)
GLUCOSE BLDC GLUCOMTR-MCNC: 163 MG/DL — HIGH (ref 70–99)
GLUCOSE BLDC GLUCOMTR-MCNC: 216 MG/DL — HIGH (ref 70–99)
GLUCOSE SERPL-MCNC: 157 MG/DL — HIGH (ref 70–99)
HCT VFR BLD CALC: 30.2 % — LOW (ref 42–52)
HGB BLD-MCNC: 9.9 G/DL — LOW (ref 14–18)
IMM GRANULOCYTES NFR BLD AUTO: 0.7 % — HIGH (ref 0.1–0.3)
LYMPHOCYTES # BLD AUTO: 0.95 K/UL — LOW (ref 1.2–3.4)
LYMPHOCYTES # BLD AUTO: 15.8 % — LOW (ref 20.5–51.1)
MCHC RBC-ENTMCNC: 29.6 PG — SIGNIFICANT CHANGE UP (ref 27–31)
MCHC RBC-ENTMCNC: 32.8 G/DL — SIGNIFICANT CHANGE UP (ref 32–37)
MCV RBC AUTO: 90.4 FL — SIGNIFICANT CHANGE UP (ref 80–94)
MONOCYTES # BLD AUTO: 0.48 K/UL — SIGNIFICANT CHANGE UP (ref 0.1–0.6)
MONOCYTES NFR BLD AUTO: 8 % — SIGNIFICANT CHANGE UP (ref 1.7–9.3)
NEUTROPHILS # BLD AUTO: 4.32 K/UL — SIGNIFICANT CHANGE UP (ref 1.4–6.5)
NEUTROPHILS NFR BLD AUTO: 71.8 % — SIGNIFICANT CHANGE UP (ref 42.2–75.2)
NRBC # BLD: 0 /100 WBCS — SIGNIFICANT CHANGE UP (ref 0–0)
PLATELET # BLD AUTO: 298 K/UL — SIGNIFICANT CHANGE UP (ref 130–400)
POTASSIUM SERPL-MCNC: 4.5 MMOL/L — SIGNIFICANT CHANGE UP (ref 3.5–5)
POTASSIUM SERPL-SCNC: 4.5 MMOL/L — SIGNIFICANT CHANGE UP (ref 3.5–5)
RBC # BLD: 3.34 M/UL — LOW (ref 4.7–6.1)
RBC # FLD: 14.5 % — SIGNIFICANT CHANGE UP (ref 11.5–14.5)
SODIUM SERPL-SCNC: 142 MMOL/L — SIGNIFICANT CHANGE UP (ref 135–146)
SPECIMEN SOURCE: SIGNIFICANT CHANGE UP
WBC # BLD: 6.01 K/UL — SIGNIFICANT CHANGE UP (ref 4.8–10.8)
WBC # FLD AUTO: 6.01 K/UL — SIGNIFICANT CHANGE UP (ref 4.8–10.8)

## 2020-03-18 PROCEDURE — 99233 SBSQ HOSP IP/OBS HIGH 50: CPT

## 2020-03-18 RX ORDER — ERTAPENEM SODIUM 1 G/1
1 INJECTION, POWDER, LYOPHILIZED, FOR SOLUTION INTRAMUSCULAR; INTRAVENOUS
Qty: 14 | Refills: 0
Start: 2020-03-18 | End: 2020-03-31

## 2020-03-18 RX ORDER — ERTAPENEM SODIUM 1 G/1
1000 INJECTION, POWDER, LYOPHILIZED, FOR SOLUTION INTRAMUSCULAR; INTRAVENOUS EVERY 24 HOURS
Refills: 0 | Status: DISCONTINUED | OUTPATIENT
Start: 2020-03-19 | End: 2020-03-23

## 2020-03-18 RX ADMIN — Medication 60 MILLIGRAM(S): at 05:02

## 2020-03-18 RX ADMIN — MEROPENEM 100 MILLIGRAM(S): 1 INJECTION INTRAVENOUS at 05:01

## 2020-03-18 RX ADMIN — HEPARIN SODIUM 5000 UNIT(S): 5000 INJECTION INTRAVENOUS; SUBCUTANEOUS at 05:03

## 2020-03-18 RX ADMIN — LISINOPRIL 40 MILLIGRAM(S): 2.5 TABLET ORAL at 05:02

## 2020-03-18 RX ADMIN — HEPARIN SODIUM 5000 UNIT(S): 5000 INJECTION INTRAVENOUS; SUBCUTANEOUS at 17:05

## 2020-03-18 RX ADMIN — MEROPENEM 100 MILLIGRAM(S): 1 INJECTION INTRAVENOUS at 13:00

## 2020-03-18 RX ADMIN — Medication 1: at 07:58

## 2020-03-18 RX ADMIN — ATORVASTATIN CALCIUM 40 MILLIGRAM(S): 80 TABLET, FILM COATED ORAL at 21:03

## 2020-03-18 RX ADMIN — PANTOPRAZOLE SODIUM 40 MILLIGRAM(S): 20 TABLET, DELAYED RELEASE ORAL at 05:02

## 2020-03-18 RX ADMIN — Medication 0: at 17:04

## 2020-03-18 RX ADMIN — Medication 2: at 11:37

## 2020-03-18 RX ADMIN — Medication 8 UNIT(S): at 11:37

## 2020-03-18 RX ADMIN — Medication 8 UNIT(S): at 07:58

## 2020-03-18 RX ADMIN — Medication 81 MILLIGRAM(S): at 11:38

## 2020-03-18 RX ADMIN — INSULIN GLARGINE 33 UNIT(S): 100 INJECTION, SOLUTION SUBCUTANEOUS at 07:59

## 2020-03-18 RX ADMIN — Medication 8 UNIT(S): at 17:04

## 2020-03-18 RX ADMIN — CHLORHEXIDINE GLUCONATE 1 APPLICATION(S): 213 SOLUTION TOPICAL at 05:01

## 2020-03-18 NOTE — PROGRESS NOTE ADULT - SUBJECTIVE AND OBJECTIVE BOX
Podiatry Progress Note    Subjective:   JOSÉ MANUEL PORTER is a pleasant well-nourished, well developed 70y Male in no acute distress, alert awake, and oriented to person, place and time.  Patient is a 70y old  Male who presents with a chief complaint of Diabetic foot ulcer (18 Mar 2020 08:06)    PAST MEDICAL & SURGICAL HISTORY:  GERD (gastroesophageal reflux disease)  Depression  Diabetic foot ulcer  Dyslipidemia  HTN (hypertension)  DM (diabetes mellitus): 12/27/18 hgb aic  11.2  Toe amputation status, right: (2008)     Objective:  Vital Signs Last 24 Hrs  T(C): 36.4 (18 Mar 2020 05:02), Max: 36.4 (17 Mar 2020 12:14)  T(F): 97.6 (18 Mar 2020 05:02), Max: 97.6 (17 Mar 2020 12:14)  HR: 72 (18 Mar 2020 05:02) (72 - 83)  BP: 130/62 (18 Mar 2020 05:02) (117/61 - 130/72)  BP(mean): --  RR: 18 (17 Mar 2020 21:34) (18 - 18)  SpO2: 98% (17 Mar 2020 19:59) (98% - 98%)                        9.9    6.01  )-----------( 298      ( 18 Mar 2020 06:41 )             30.2                 03-18    142  |  x   |  x   ----------------------------<  x   x    |  x   |  x     Ca    8.6      17 Mar 2020 07:13    Derm:   Sutures Intact; Wound Edges Well Coapted; w/o signs of wound dehiscences     Vascular: DP and PT Pulses Diminished; Foot is warm to warm to the touch   Neuro: Protective sensation diminished to the level of the digits right foot    Assessment:   Sutures Intact; Wound Edges are well coapted; No Signs of Wound Dehiscences   s/p 5th Metatarsal Resection Right Foot    Plan:   Chart reviewed and Patient evaluated. All Questions and concerns were addressed and answered   Discussed diagnosis and treatment with patient  Wound Care Orders; Flush w/ Normal Saline; Apply Betadine Soaked Adaptic / DSD / Kerlix  Continue w/ Local Wound Care; Dressing Changes Q24 / Betadine Adaptic   Weight Bearing Status; NWB Right Foot; Can Apply Pressure to Heel to Transition w/ Surgical Shoe  Patient Is Stable Per Podiatry Standpoint; Can Follow Up as Outpatient; One Week Post Discharge  Follow Up w/ ID; Discharge ABx Regiment  Discussed Plan w/ Dr. Bhatia     Spectra;

## 2020-03-18 NOTE — DISCHARGE NOTE PROVIDER - NSDCCPCAREPLAN_GEN_ALL_CORE_FT
PRINCIPAL DISCHARGE DIAGNOSIS  Diagnosis: Diabetic foot ulcer  Assessment and Plan of Treatment: You presented for right diabetic foot ulcer. XR R foot: OM w 5th metatarsal and questionable 3rd metatarsal involvement. Arterial Duplex R: minimal stenosis of tibial artery.  you went with podiatry for debridement in the OR and wound washout on 3/16/20. Bone culture was taken.  you were started on antibotics after the procedure and will be discharged on iv antibiotics for 14 days. You will follow up with podiatry in the clinics and with infectious disease regarding the culture result and in case antibioitcs regimen needs to be adjusted. You should walk wearing the shoe and apply pressure as tolerated to the right heel only on the right leg.

## 2020-03-18 NOTE — DIETITIAN INITIAL EVALUATION ADULT. - ENERGY NEEDS
estimated energy needs: 2035-2210kcal (MSJ x1.2-1.3AF)  estimated protein needs: 92kg (1.0g/kg) considers borderline bmi  estimated fluid needs: 1ml/kcal

## 2020-03-18 NOTE — DISCHARGE NOTE PROVIDER - CARE PROVIDER_API CALL
Gary Turpin (MD)  Infectious Disease; Internal Medicine  [address] [place] [address]  Phone: [phone]  Fax: [phone]  Scheduled Appointment: 03/24/2020 11:00 AM    Royal Bhatia ([facility])  Podiatric Medicine  [address] [place] [address] [address] [place] [address]  Phone: [phone]  Fax: [phone]  Follow Up Time: 1 week

## 2020-03-18 NOTE — PHYSICAL THERAPY INITIAL EVALUATION ADULT - GAIT DEVIATIONS NOTED, PT EVAL
decreased stride length/Unequal step length; Patient ambulated step to with LLE and swing through with RLE; forward flexed posture/decreased step length

## 2020-03-18 NOTE — DISCHARGE NOTE PROVIDER - HOSPITAL COURSE
69 year-old male with PMH of insulin-dependent DM c/b diabetic foot ulcers s/p amputations of half of left great toe and entire right great toe, PVD, HTN, HLD, GERD, depression, recently amputated right 3rd toe (Dec 4th) right 4th toe (Dec 11) s/p debridement on Dec 11th presented to the Ed for right foot discharge.    patient had no evidence of sepsis on admission. XR R foot: OM w 5th metatarsal; questionable 3rd metatarsal involvement. - Arterial Duplex R: minimal stenosis of tibial artery    He alea with podiatry for debridement in the OR and wound washout on 3/16. Bone culture was taken.    Patient was started on meropenem after the procedure. He will be discharged on ertapenem for 14 days.    He will follow up with podiatry and ID in the clinics for culture results. 69 year-old male with PMH of insulin-dependent DM c/b diabetic foot ulcers s/p amputations of half of left great toe and entire right great toe, PVD, HTN, HLD, GERD, depression, recently amputated right 3rd toe (Dec 4th) right 4th toe (Dec 11) s/p debridement on Dec 11th presented to the Ed for right foot discharge.    patient had no evidence of sepsis on admission. XR R foot: OM w 5th metatarsal; questionable 3rd metatarsal involvement. - Arterial Duplex R: minimal stenosis of tibial artery    He went with podiatry for debridement in the OR and wound washout on 3/16. Bone culture was taken.    Patient was started on meropenem after the procedure. He will be discharged on ertapenem for 14 days.    He will follow up with podiatry and ID in the clinics for culture results.

## 2020-03-18 NOTE — PROGRESS NOTE ADULT - SUBJECTIVE AND OBJECTIVE BOX
CHARLOTTEJOSÉ MANUEL  70y, Male  Allergy: No Known Allergies      LOS  6d    CHIEF COMPLAINT: Diabetic foot ulcer (18 Mar 2020 10:36)      INTERVAL EVENTS/HPI  - No acute events overnight  - T(F): , Max: 97.6 (03-17-20 @ 21:34)  - Denies any worsening symptoms  - Tolerating medication  - WBC Count: 6.01 (03-18-20 @ 06:41)  WBC Count: 8.39 (03-17-20 @ 07:13)  - Creatinine, Serum: 1.0 (03-18-20 @ 06:41)  Creatinine, Serum: 1.0 (03-17-20 @ 07:13)       ROS  General: Denies rigors, nightsweats  HEENT: Denies headache, rhinorrhea, sore throat, eye pain  CV: Denies CP, palpitations  PULM: Denies wheezing, hemoptysis  GI: Denies hematemesis, hematochezia, melena  : Denies discharge, hematuria  MSK: Denies arthralgias, myalgias  SKIN: Denies rash, lesions  NEURO: Denies paresthesias, weakness  PSYCH: Denies depression, anxiety    VITALS:  T(F): 97.6, Max: 97.6 (03-17-20 @ 21:34)  HR: 72  BP: 130/62  RR: 18Vital Signs Last 24 Hrs  T(C): 36.4 (18 Mar 2020 05:02), Max: 36.4 (17 Mar 2020 21:34)  T(F): 97.6 (18 Mar 2020 05:02), Max: 97.6 (17 Mar 2020 21:34)  HR: 72 (18 Mar 2020 05:02) (72 - 82)  BP: 130/62 (18 Mar 2020 05:02) (130/62 - 130/72)  BP(mean): --  RR: 18 (17 Mar 2020 21:34) (18 - 18)  SpO2: 98% (17 Mar 2020 19:59) (98% - 98%)    PHYSICAL EXAM:  Gen: NAD, resting in bed  HEENT: Normocephalic, atraumatic  Neck: supple, no lymphadenopathy  CV: Regular rate & regular rhythm  Lungs: decreased BS at bases, no fremitus  Abdomen: Soft, BS present  Ext: Warm, well perfused, R foot stump clean stitches  Neuro: non focal, awake  Skin: no rash, no erythema  Lines: no phlebitis      FH: Non-contributory  Social Hx: Non-contributory    TESTS & MEASUREMENTS:                        9.9    6.01  )-----------( 298      ( 18 Mar 2020 06:41 )             30.2     03-18    142  |  104  |  25<H>  ----------------------------<  157<H>  4.5   |  25  |  1.0    Ca    8.9      18 Mar 2020 06:41      eGFR if Non African American: 75 mL/min/1.73M2 (03-18-20 @ 06:41)  eGFR if : 87 mL/min/1.73M2 (03-18-20 @ 06:41)          Culture - Surgical Swab (collected 03-16-20 @ 12:26)  Source: .Surgical Swab right foot  Preliminary Report (03-18-20 @ 08:53):    Numerous Streptococcus agalactiae (Group B) isolated    Group B streptococci are susceptible to ampicillin,    penicillin and cefazolin, but may be resistant to    erythromycin and clindamycin.    Recommendations for intrapartum prophylaxis for Group B    streptococci are penicillin or ampicillin.    Culture - Blood (collected 03-13-20 @ 07:36)  Source: .Blood None  Final Report (03-18-20 @ 14:00):    No growth at 5 days.            INFECTIOUS DISEASES TESTING      INFLAMMATORY MARKERS  Sedimentation Rate, Erythrocyte: 54 mm/Hr (03-13-20 @ 07:36)  C-Reactive Protein, Serum: 7.31 mg/dL (03-13-20 @ 07:36)  Sedimentation Rate, Erythrocyte: 70 mm/Hr (12-03-19 @ 11:00)  C-Reactive Protein, Serum: 6.93 mg/dL (12-03-19 @ 11:00)      RADIOLOGY & ADDITIONAL TESTS:  I have personally reviewed the last available Chest xray  CXR      CT      CARDIOLOGY TESTING      MEDICATIONS  aspirin enteric coated 81  atorvastatin 40  chlorhexidine 4% Liquid 1  dextrose 5%. 1000  dextrose 50% Injectable 12.5  dextrose 50% Injectable 25  dextrose 50% Injectable 25  heparin  Injectable 5000  insulin glargine Injectable (LANTUS) 33  insulin lispro (HumaLOG) corrective regimen sliding scale   insulin lispro Injectable (HumaLOG) 8  lisinopril 40  NIFEdipine XL 60  pantoprazole    Tablet 40      WEIGHT  Weight (kg): 92.1 (03-16-20 @ 11:01)  Creatinine, Serum: 1.0 mg/dL (03-18-20 @ 06:41)      ANTIBIOTICS:      All available historical records have been reviewed

## 2020-03-18 NOTE — DIETITIAN INITIAL EVALUATION ADULT. - DIET TYPE
RECOMMENDATIONS: Continue DASH/TLC, CHO consistent diet modifier. RD attempted diet education but pt is not receptive, reports receiving diet education previously. Discussed portion control and outpt RD.

## 2020-03-18 NOTE — PHYSICAL THERAPY INITIAL EVALUATION ADULT - GENERAL OBSERVATIONS, REHAB EVAL
Patient seen from 9:00-10:15. Pt encountered in bed upon PT arrival in no apparent distress. + IV lock + dressing R foot. Patient agreeable to evaluation.

## 2020-03-18 NOTE — PROCEDURE NOTE - NSPROCDETAILS_GEN_ALL_CORE
ultrasound assessment/ultrasound guidance/sterile dressing applied/sterile technique, catheter placed/location identified, draped/prepped, sterile technique used/supine position

## 2020-03-18 NOTE — DISCHARGE NOTE PROVIDER - NSDCHHNEEDSERVICE_GEN_ALL_CORE
Wound care and assessment/Medication teaching and assessment/Observation and assessment/Teaching and training

## 2020-03-18 NOTE — DISCHARGE NOTE PROVIDER - PROVIDER TOKENS
PROVIDER:[TOKEN:[[id number]],SCHEDULEDAPPT:[03/24/2020],SCHEDULEDAPPTTIME:[11:00 AM]],PROVIDER:[TOKEN:[[id number]],FOLLOWUP:[1 week]]

## 2020-03-18 NOTE — DIETITIAN INITIAL EVALUATION ADULT. - PHYSICAL APPEARANCE
R DFU noted. No edema. Pt reports intentional wt loss PTA. Reports #. #/92.1kg. BMI: 28.3. IBW: 78.2kg+/-10%.

## 2020-03-18 NOTE — PROGRESS NOTE ADULT - SUBJECTIVE AND OBJECTIVE BOX
SUBJECTIVE:    Patient is a 70y old Male who presents with a chief complaint of Diabetic foot ulcer (17 Mar 2020 12:50)    Overnight Events: Patient is stable, no acute events overnight.  VS are stable, he has no complaints.     PAST MEDICAL & SURGICAL HISTORY  GERD (gastroesophageal reflux disease)  Depression  Diabetic foot ulcer  Dyslipidemia  HTN (hypertension)  DM (diabetes mellitus): 12/27/18 hgb aic  11.2  Toe amputation status, right: (2008)    SOCIAL HISTORY:  Negative for smoking/alcohol/drug use.     ALLERGIES:  No Known Allergies    MEDICATIONS:  STANDING MEDICATIONS  aspirin enteric coated 81 milliGRAM(s) Oral daily  atorvastatin 40 milliGRAM(s) Oral at bedtime  chlorhexidine 4% Liquid 1 Application(s) Topical <User Schedule>  dextrose 5%. 1000 milliLiter(s) IV Continuous <Continuous>  dextrose 50% Injectable 12.5 Gram(s) IV Push once  dextrose 50% Injectable 25 Gram(s) IV Push once  dextrose 50% Injectable 25 Gram(s) IV Push once  heparin  Injectable 5000 Unit(s) SubCutaneous every 12 hours  insulin glargine Injectable (LANTUS) 33 Unit(s) SubCutaneous every morning  insulin lispro (HumaLOG) corrective regimen sliding scale   SubCutaneous three times a day before meals  insulin lispro Injectable (HumaLOG) 8 Unit(s) SubCutaneous three times a day before meals  lisinopril 40 milliGRAM(s) Oral daily  meropenem  IVPB 1000 milliGRAM(s) IV Intermittent every 8 hours  NIFEdipine XL 60 milliGRAM(s) Oral daily  pantoprazole    Tablet 40 milliGRAM(s) Oral before breakfast    PRN MEDICATIONS  dextrose 40% Gel 15 Gram(s) Oral once PRN  glucagon  Injectable 1 milliGRAM(s) IntraMuscular once PRN    VITALS:   T(F): 97.6, Max: 97.6 (03-17-20 @ 12:14)  HR: 72 (72 - 83)  BP: 130/62 (117/61 - 130/72)  RR: 18 (18 - 18)  SpO2: 98% (98% - 98%)    LABS:                        9.9    6.01  )-----------( 298      ( 18 Mar 2020 06:41 )             30.2     03-17    140  |  104  |  22<H>  ----------------------------<  197<H>  4.6   |  26  |  1.0    Ca    8.6      17 Mar 2020 07:13                        03-17-20 @ 07:01  -  03-18-20 @ 07:00  --------------------------------------------------------  IN: 0 mL / OUT: 1 mL / NET: -1 mL      PHYSICAL EXAM:  GEN: NAD, comfortable  LUNGS: CTAB, no w/r/r  HEART: RRR, s1 and s2 appreciated, no m/r/g  ABD: soft, NT/ND, +BS  EXT: no edema, PP b/l  NEURO: AAOX3

## 2020-03-18 NOTE — PROGRESS NOTE ADULT - ATTENDING COMMENTS
I saw and evaluated patient  by bedside, no active complains, tolerating diet well.    All labs, radiology studies, VS was reviewed  I have reviewed the resident's note and agree with documented findings and  plan of care.  a/p:  69 year-old male with PMH of insulin-dependent DM c/b diabetic foot ulcers s/p amputations of half of left great toe and entire right great toe, PVD, HTN, HLD, GERD, depression, recently amputated right 3rd toe (Dec 4th) right 4th toe (Dec 11) s/p debridement on Dec 11th presented to the Ed for right foot discharge.     # Diabetic foot Ulcer: Right foot  #Acute Osteomyelitis  -no evidence of sepsis on admission  -s/p meropenem and bactrim   -XR R foot: OM w 5th metatarsal; questionable 3rd metatarsal involvement  - Arterial Duplex R: minimal stenosis of tibial artery  - Venous Duplex BL: no DVT  - Vascular on board: no intervention  - Podiatry: Had Dbx 3/16. Awaiting Cxs. IV meropenem interim.  - fu blood cultures- no growth  - esr 54  -s/o surgical debridement, as per Podiatry patient can be d/c home on IV abx. with outpatient Podiatry f/up.   -as per ID patient will be d/c with midline to receive IV Ertapenem tx. and f/up repeated cxs results post dc home.     # Insulin-dependent T2DM  -Takes Humalin 30 units at breakfast, Trulicity 0.5 weekly, and Janumet  BID outpatient  -cw basal bolus insulin at this time  -Monitor fingersticks with meals and at bedtime    # Hypertension  -Takes Benazepril 40 mg qD at home  -C/w Lisinopril 40 mg qD for now      # Hyperlipidemia  -Takes Crestor 10 mg qHS at home  -C/w Atorvastatin 40 mg qHS for now    # GERD  -cw PPI    # Depression  -not on any med  -fu outpt psych    #Progress Note Handoff: Pending  d/c arrangement to home with home care once IV abx. tx. arranged by case management.   Family discussion: medical plan of tx. was d/w pt. by bedside Disposition: Home__with home care

## 2020-03-18 NOTE — DISCHARGE NOTE PROVIDER - NSDCMRMEDTOKEN_GEN_ALL_CORE_FT
Aspirin Enteric Coated 81 mg oral delayed release tablet: 1 tab(s) orally once a day   benazepril 40 mg oral tablet: 1 tab(s) orally once a day  Crestor 10 mg oral tablet: 1 tab(s) orally once a day (at bedtime)  Janumet 50 mg-1000 mg oral tablet: 1 tab(s) orally 2 times a day  Lantus 100 units/mL subcutaneous solution: 30 unit(s) subcutaneous once a day in the morning  NIFEdipine 60 mg oral tablet, extended release: 1 tab(s) orally once a day  omeprazole 20 mg oral delayed release capsule: 1 cap(s) orally once a day  Trulicity Pen 0.75 mg/0.5 mL subcutaneous solution: Aspirin Enteric Coated 81 mg oral delayed release tablet: 1 tab(s) orally once a day   benazepril 40 mg oral tablet: 1 tab(s) orally once a day  Crestor 10 mg oral tablet: 1 tab(s) orally once a day (at bedtime)  Janumet 50 mg-1000 mg oral tablet: 1 tab(s) orally 2 times a day  Lantus 100 units/mL subcutaneous solution: 30 unit(s) subcutaneous once a day in the morning  Levaquin 750 mg oral tablet: 1 tab(s) orally once a day   NIFEdipine 60 mg oral tablet, extended release: 1 tab(s) orally once a day  omeprazole 20 mg oral delayed release capsule: 1 cap(s) orally once a day  Trulicity Pen 0.75 mg/0.5 mL subcutaneous solution: Aspirin Enteric Coated 81 mg oral delayed release tablet: 1 tab(s) orally once a day   benazepril 40 mg oral tablet: 1 tab(s) orally once a day  Crestor 10 mg oral tablet: 1 tab(s) orally once a day (at bedtime)  ertapenem 1 g injection: 1 gram(s) intravenously once a day   end date : 3/30/2020  Janumet 50 mg-1000 mg oral tablet: 1 tab(s) orally 2 times a day  Lantus 100 units/mL subcutaneous solution: 30 unit(s) subcutaneous once a day in the morning  NIFEdipine 60 mg oral tablet, extended release: 1 tab(s) orally once a day  omeprazole 20 mg oral delayed release capsule: 1 cap(s) orally once a day  Trulicity Pen 0.75 mg/0.5 mL subcutaneous solution:

## 2020-03-18 NOTE — PHYSICAL THERAPY INITIAL EVALUATION ADULT - PERTINENT HX OF CURRENT PROBLEM, REHAB EVAL
Patient is a 70 year old male admitted for diabetic foot ulcers s/p amputations of half of left great toe and entire right great toe, PVD, HTN, HLD, GERD, depression, recently amputated right 3rd toe (Dec 4th) right 4th toe (Dec 11) s/p debridement on Dec 11th presented to the Ed for right foot discharge since yesterday.

## 2020-03-18 NOTE — DIETITIAN INITIAL EVALUATION ADULT. - OTHER INFO
Pt presented to ED for R foot discharge x1 day PTA. DFU of R foot, s/p OR for debridement and wound wash out 3/16. Pt with h x of amputations of half of great L toe and entire R great toe, and R third and fourth toes. Pt is an insulin-dependent T2DM.

## 2020-03-18 NOTE — PROGRESS NOTE ADULT - ASSESSMENT
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    IMPRESSION  #Osteomyelitis of 5th right metatarsal; possible osteomyelitis of 3rd right metatarsal, so systemic symptoms.     s/p Pulsed irrigation of wound 16-Mar-2020 Biopsy, bone, toe, open 16-Mar-2020 13:06:54 Resection, bone, metatarsal     3/16 OR swab  Numerous Streptococcus agalactiae (Group B)     Per Pod 5th met healthy bone, 3rd met soft bone concerning for possible OM-- pending biopsy, soft bone     Hx enterobacter & stenotrophomonas 12/2019   Sedimentation Rate, Erythrocyte: 54 mm/Hr (03.13.20 @ 07:36) C-Reactive Protein, Serum: 7.31 mg/dL (03.13.20 @ 07:36)    BCx 3/13 NG  #Sepsis ruled out on admission     RECOMMENDATIONS   - f/u OR cultures , path pending   - Podiatry following  - plan for midline x ertapenem 1g q24h IV (given hx GNRs) x 14 days from OR and if path of 3rd Met is + will prolong another 14 days then possible levaquin PO.   - Weekly CBC, BMP  - ID follow-up with America Turpin 3/24 12:30 PM       [address]       [phone] (call to make an appointment, walk-ins Tuesdays 10:30 AM)      Fax [phone]

## 2020-03-18 NOTE — DISCHARGE NOTE PROVIDER - NSDCFUADDAPPT_GEN_ALL_CORE_FT
ID follow-up with America Turpin 3/24 11AM      [address] [facility]       [phone] (call to make an appointment, walk-ins Tuesdays 10:30 AM)      Fax [phone]

## 2020-03-18 NOTE — PROGRESS NOTE ADULT - ASSESSMENT
69 year-old male with PMH of insulin-dependent DM c/b diabetic foot ulcers s/p amputations of half of left great toe and entire right great toe, PVD, HTN, HLD, GERD, depression, recently amputated right 3rd toe (Dec 4th) right 4th toe (Dec 11) s/p debridement on Dec 11th presented to the Ed for right foot discharge since yesterday.     # Diabetic foot Ulcer: Right foot  -no evidence of sepsis on admission  -XR R foot: OM w 5th metatarsal; questionable 3rd metatarsal involvement  -s/p OR for debridement and wound washout on 3/16  -ID eval: will withold antibiotics until bone cultures taken, started on meropenem on 3/17/20  -like plan for midline x ertapenem 1g q24h IV x 14 days and if path of 3rd Met is + will prolong another 14 days then PO.   - f/u bone cultures and adjust abx accordingly  - Arterial Duplex R: minimal stenosis of tibial artery  - Venous Duplex BL: no DVT  - Vascular on board: no intervention    - blood cultures - negative   - esr 54/ crp 7.31    # Insulin-dependent T2DM  -Takes Humalin 30 units at breakfast, Trulicity 0.5 weekly, and Janumet  BID outpatient  -cw basal bolus insulin at this time  -Monitor fingersticks with meals and at bedtime    # Hypertension  -Takes Benazepril 40 mg qD at home  -C/w Lisinopril 40 mg qD for now  -Monitor BP    # Hyperlipidemia  -Takes Crestor 10 mg qHS at home  -C/w Atorvastatin 40 mg qHS for now    # GERD  -cw PPI    # Depression  -not on any med  -fu outpt psych    DVT ppx: Heparin   GI ppx: Protonix  Diet: DASH/TLC consistent carb  Activity: OOBTC  Code status: FULL CODE  Dispo: From home, pending bone culture

## 2020-03-18 NOTE — DIETITIAN INITIAL EVALUATION ADULT. - FACTORS AFF FOOD INTAKE
Pt reports good appetite and PO intake, consistent consuming % of meals. Last BM 3/15, denies GI s/s. NKFA. Denies chew/swallow difficulty. PTA pt supplements vit A. Pt reports receiving diet education in the past, RD provided brief diet education for portion control in setting of DM. Pt declines full diet educations. RD encouraged outpt RD f/u.

## 2020-03-19 LAB
ANION GAP SERPL CALC-SCNC: 10 MMOL/L — SIGNIFICANT CHANGE UP (ref 7–14)
BACTERIA WND CULT: ABNORMAL
BASOPHILS # BLD AUTO: 0.02 K/UL — SIGNIFICANT CHANGE UP (ref 0–0.2)
BASOPHILS NFR BLD AUTO: 0.3 % — SIGNIFICANT CHANGE UP (ref 0–1)
BUN SERPL-MCNC: 21 MG/DL — HIGH (ref 10–20)
CALCIUM SERPL-MCNC: 8.9 MG/DL — SIGNIFICANT CHANGE UP (ref 8.5–10.1)
CHLORIDE SERPL-SCNC: 105 MMOL/L — SIGNIFICANT CHANGE UP (ref 98–110)
CO2 SERPL-SCNC: 24 MMOL/L — SIGNIFICANT CHANGE UP (ref 17–32)
CREAT SERPL-MCNC: 1.1 MG/DL — SIGNIFICANT CHANGE UP (ref 0.7–1.5)
EOSINOPHIL # BLD AUTO: 0.17 K/UL — SIGNIFICANT CHANGE UP (ref 0–0.7)
EOSINOPHIL NFR BLD AUTO: 2.9 % — SIGNIFICANT CHANGE UP (ref 0–8)
GLUCOSE BLDC GLUCOMTR-MCNC: 144 MG/DL — HIGH (ref 70–99)
GLUCOSE BLDC GLUCOMTR-MCNC: 150 MG/DL — HIGH (ref 70–99)
GLUCOSE BLDC GLUCOMTR-MCNC: 155 MG/DL — HIGH (ref 70–99)
GLUCOSE BLDC GLUCOMTR-MCNC: 237 MG/DL — HIGH (ref 70–99)
GLUCOSE SERPL-MCNC: 156 MG/DL — HIGH (ref 70–99)
HCT VFR BLD CALC: 31.3 % — LOW (ref 42–52)
HGB BLD-MCNC: 10.1 G/DL — LOW (ref 14–18)
IMM GRANULOCYTES NFR BLD AUTO: 0.8 % — HIGH (ref 0.1–0.3)
LYMPHOCYTES # BLD AUTO: 0.9 K/UL — LOW (ref 1.2–3.4)
LYMPHOCYTES # BLD AUTO: 15.1 % — LOW (ref 20.5–51.1)
MCHC RBC-ENTMCNC: 28.5 PG — SIGNIFICANT CHANGE UP (ref 27–31)
MCHC RBC-ENTMCNC: 32.3 G/DL — SIGNIFICANT CHANGE UP (ref 32–37)
MCV RBC AUTO: 88.4 FL — SIGNIFICANT CHANGE UP (ref 80–94)
MONOCYTES # BLD AUTO: 0.54 K/UL — SIGNIFICANT CHANGE UP (ref 0.1–0.6)
MONOCYTES NFR BLD AUTO: 9.1 % — SIGNIFICANT CHANGE UP (ref 1.7–9.3)
NEUTROPHILS # BLD AUTO: 4.28 K/UL — SIGNIFICANT CHANGE UP (ref 1.4–6.5)
NEUTROPHILS NFR BLD AUTO: 71.8 % — SIGNIFICANT CHANGE UP (ref 42.2–75.2)
NRBC # BLD: 0 /100 WBCS — SIGNIFICANT CHANGE UP (ref 0–0)
PLATELET # BLD AUTO: 296 K/UL — SIGNIFICANT CHANGE UP (ref 130–400)
POTASSIUM SERPL-MCNC: 5.1 MMOL/L — HIGH (ref 3.5–5)
POTASSIUM SERPL-SCNC: 5.1 MMOL/L — HIGH (ref 3.5–5)
RBC # BLD: 3.54 M/UL — LOW (ref 4.7–6.1)
RBC # FLD: 14.3 % — SIGNIFICANT CHANGE UP (ref 11.5–14.5)
SODIUM SERPL-SCNC: 139 MMOL/L — SIGNIFICANT CHANGE UP (ref 135–146)
WBC # BLD: 5.96 K/UL — SIGNIFICANT CHANGE UP (ref 4.8–10.8)
WBC # FLD AUTO: 5.96 K/UL — SIGNIFICANT CHANGE UP (ref 4.8–10.8)

## 2020-03-19 PROCEDURE — 99233 SBSQ HOSP IP/OBS HIGH 50: CPT

## 2020-03-19 PROCEDURE — 99239 HOSP IP/OBS DSCHRG MGMT >30: CPT

## 2020-03-19 RX ORDER — ERTAPENEM SODIUM 1 G/1
1 INJECTION, POWDER, LYOPHILIZED, FOR SOLUTION INTRAMUSCULAR; INTRAVENOUS
Qty: 14 | Refills: 0
Start: 2020-03-19 | End: 2020-04-01

## 2020-03-19 RX ADMIN — LISINOPRIL 40 MILLIGRAM(S): 2.5 TABLET ORAL at 05:28

## 2020-03-19 RX ADMIN — Medication 2: at 11:55

## 2020-03-19 RX ADMIN — Medication 8 UNIT(S): at 11:55

## 2020-03-19 RX ADMIN — ATORVASTATIN CALCIUM 40 MILLIGRAM(S): 80 TABLET, FILM COATED ORAL at 21:19

## 2020-03-19 RX ADMIN — ERTAPENEM SODIUM 120 MILLIGRAM(S): 1 INJECTION, POWDER, LYOPHILIZED, FOR SOLUTION INTRAMUSCULAR; INTRAVENOUS at 05:40

## 2020-03-19 RX ADMIN — HEPARIN SODIUM 5000 UNIT(S): 5000 INJECTION INTRAVENOUS; SUBCUTANEOUS at 17:33

## 2020-03-19 RX ADMIN — Medication 60 MILLIGRAM(S): at 05:27

## 2020-03-19 RX ADMIN — Medication 8 UNIT(S): at 17:33

## 2020-03-19 RX ADMIN — INSULIN GLARGINE 33 UNIT(S): 100 INJECTION, SOLUTION SUBCUTANEOUS at 10:38

## 2020-03-19 RX ADMIN — Medication 1: at 08:39

## 2020-03-19 RX ADMIN — PANTOPRAZOLE SODIUM 40 MILLIGRAM(S): 20 TABLET, DELAYED RELEASE ORAL at 05:27

## 2020-03-19 RX ADMIN — Medication 8 UNIT(S): at 08:39

## 2020-03-19 RX ADMIN — HEPARIN SODIUM 5000 UNIT(S): 5000 INJECTION INTRAVENOUS; SUBCUTANEOUS at 05:28

## 2020-03-19 RX ADMIN — Medication 81 MILLIGRAM(S): at 11:55

## 2020-03-19 NOTE — PROGRESS NOTE ADULT - SUBJECTIVE AND OBJECTIVE BOX
JOSÉ MANUEL PORTER  [facility] 014 A ([facility])            Patient was evaluated and examined  by bedside, no active complains            REVIEW OF SYSTEMS:  please see pertinent positives mentioned above, all other 12 ROS negative      T(C): , Max: 36.6 (03-18-20 @ 22:00)  HR: 77 (03-19-20 @ 07:30)  BP: 155/74 (03-19-20 @ 07:30)  RR: 18 (03-19-20 @ 07:30)  SpO2: --  CAPILLARY BLOOD GLUCOSE      POCT Blood Glucose.: 155 mg/dL (19 Mar 2020 07:57)  POCT Blood Glucose.: 163 mg/dL (18 Mar 2020 21:50)  POCT Blood Glucose.: 121 mg/dL (18 Mar 2020 16:35)  POCT Blood Glucose.: 216 mg/dL (18 Mar 2020 11:24)      PHYSICAL EXAM:  General: NAD, AAOX3, patient is laying comfortably in bed  HEENT: AT, NC, Supple, NO JVD, NO CB  Lungs: CTA B/L, no wheezing, no rhonchi  CVS: normal S1, S2, RRR, NO M/G/R  Abdomen: soft, bowel sounds present, non-tender, non-distended  Extremities: right foot- incision with sutures is well healing, no edema, no clubbing, no cyanosis, positive peripheral pulses b/l  Neuro: no acute focal neurological deficits  Skin: no rash, no ecchymosis      LAB  CBC  Date: 03-19-20 @ 04:30  Mean cell Uffxwlqlrf79.5  Mean cell Hemoglobin Conc32.3  Mean cell Volum 88.4  Platelet count-Automate 296  RBC Count 3.54  Red Cell Distrib Width14.3  WBC Count5.96  % Albumin, Urine--  Hematocrit 31.3  Hemoglobin 10.1  CBC  Date: 03-18-20 @ 06:41  Mean cell Bszevwhfbd68.6  Mean cell Hemoglobin Conc32.8  Mean cell Volum 90.4  Platelet count-Automate 298  RBC Count 3.34  Red Cell Distrib Width14.5  WBC Count6.01  % Albumin, Urine--  Hematocrit 30.2  Hemoglobin 9.9  CBC  Date: 03-17-20 @ 07:13  Mean cell Ndiqrnkybi15.3  Mean cell Hemoglobin Conc32.7  Mean cell Volum 89.8  Platelet count-Automate 290  RBC Count 3.34  Red Cell Distrib Width14.6  WBC Count8.39  % Albumin, Urine--  Hematocrit 30.0  Hemoglobin 9.8  CBC  Date: 03-16-20 @ 06:28  Mean cell Qhdgbfnpxw62.7  Mean cell Hemoglobin Conc32.3  Mean cell Volum 88.9  Platelet count-Automate 300  RBC Count 3.41  Red Cell Distrib Width14.6  WBC Count5.21  % Albumin, Urine--  Hematocrit 30.3  Hemoglobin 9.8  CBC  Date: 03-15-20 @ 07:28  Mean cell Qwnqtjxfyt98.7  Mean cell Hemoglobin Conc33.4  Mean cell Volum 88.8  Platelet count-Automate 277  RBC Count 3.57  Red Cell Distrib Width14.6  WBC Count5.60  % Albumin, Urine--  Hematocrit 31.7  Hemoglobin 10.6  CBC  Date: 03-14-20 @ 07:19  Mean cell Umzxpoetqf12.7  Mean cell Hemoglobin Conc32.7  Mean cell Volum 87.8  Platelet count-Automate 290  RBC Count 3.45  Red Cell Distrib Width14.8  WBC Count6.23  % Albumin, Urine--  Hematocrit 30.3  Hemoglobin 9.9  CBC  Date: 03-13-20 @ 07:36  Mean cell Jtwzktptup63.7  Mean cell Hemoglobin Conc32.8  Mean cell Volum 87.7  Platelet count-Automate 271  RBC Count 3.41  Red Cell Distrib Width14.6  WBC Count7.07  % Albumin, Urine--  Hematocrit 29.9  Hemoglobin 9.8  CBC  Date: 03-12-20 @ 16:18  Mean cell Nuivmgyxnu45.1  Mean cell Hemoglobin Conc33.7  Mean cell Volum 89.2  Platelet count-Automate 283  RBC Count 3.89  Red Cell Distrib Width14.7  WBC Count10.31  % Albumin, Urine--  Hematocrit 34.7  Hemoglobin 11.7    [facility] [place]  03-19-20 @ 04:30  Blood Gas Arterial-Calcium,Ionized--  Blood Urea Nitrogen, Serum 21 mg/dL<H> [10 - 20]  Carbon Dioxide, Serum24 mmol/L [17 - 32]  Chloride, Mtpjv037 mmol/L [98 - 110]  Creatinie, Serum1.1 mg/dL [0.7 - 1.5]  Glucose, Cvuva342 mg/dL<H> [70 - 99]  Potassium, Serum5.1 mmol/L<H> [3.5 - 5.0] [Slighty Hemolyzed use with Caution]  Sodium, Serum 139 mmol/L [135 - 146]  BMP  03-18-20 @ 06:41  Blood Gas Arterial-Calcium,Ionized--  Blood Urea Nitrogen, Serum 25 mg/dL<H> [10 - 20]  Carbon Dioxide, Serum25 mmol/L [17 - 32]  Chloride, Uqeat672 mmol/L [98 - 110]  Creatinie, Serum1.0 mg/dL [0.7 - 1.5]  Glucose, Mbouq216 mg/dL<H> [70 - 99]  Potassium, Serum4.5 mmol/L [3.5 - 5.0]  Sodium, Serum 142 mmol/L [135 - 146]  [facility] [place]  03-17-20 @ 07:13  Blood Gas Arterial-Calcium,Ionized--  Blood Urea Nitrogen, Serum 22 mg/dL<H> [10 - 20]  Carbon Dioxide, Serum26 mmol/L [17 - 32]  Chloride, Qxlgt803 mmol/L [98 - 110]  Creatinie, Serum1.0 mg/dL [0.7 - 1.5]  Glucose, Tevza366 mg/dL<H> [70 - 99]  Potassium, Serum4.6 mmol/L [3.5 - 5.0]  Sodium, Serum 140 mmol/L [135 - 146]  [facility] [place]  03-16-20 @ 06:28  Blood Gas Arterial-Calcium,Ionized--  Blood Urea Nitrogen, Serum 24 mg/dL<H> [10 - 20]  Carbon Dioxide, Serum24 mmol/L [17 - 32]  Chloride, Fxyec482 mmol/L [98 - 110]  Creatinie, Serum1.0 mg/dL [0.7 - 1.5]  Glucose, Nduyn717 mg/dL<H> [70 - 99]  Potassium, Serum4.6 mmol/L [3.5 - 5.0]  Sodium, Serum 137 mmol/L [135 - 146]  [facility] [place]  03-15-20 @ 07:28  Blood Gas Arterial-Calcium,Ionized--  Blood Urea Nitrogen, Serum 22 mg/dL<H> [10 - 20]  Carbon Dioxide, Serum22 mmol/L [17 - 32]  Chloride, Msmqw623 mmol/L [98 - 110]  Creatinie, Serum1.2 mg/dL [0.7 - 1.5]  Glucose, Ksnri614 mg/dL<H> [70 - 99]  Potassium, Serum4.5 mmol/L [3.5 - 5.0]  Sodium, Serum 139 mmol/L [135 - 146]              Microbiology:    Culture - Surgical Swab (collected 03-16-20 @ 12:26)  Source: .Surgical Swab right foot  Preliminary Report (03-18-20 @ 08:53):    Numerous Streptococcus agalactiae (Group B) isolated    Group B streptococci are susceptible to ampicillin,    penicillin and cefazolin, but may be resistant to    erythromycin and clindamycin.    Recommendations for intrapartum prophylaxis for Group B    streptococci are penicillin or ampicillin.    Culture - Blood (collected 03-13-20 @ 07:36)  Source: .Blood None  Final Report (03-18-20 @ 14:00):    No growth at 5 days.      Medications:  aspirin enteric coated 81 milliGRAM(s) Oral daily  atorvastatin 40 milliGRAM(s) Oral at bedtime  chlorhexidine 4% Liquid 1 Application(s) Topical <User Schedule>  dextrose 40% Gel 15 Gram(s) Oral once PRN  dextrose 5%. 1000 milliLiter(s) IV Continuous <Continuous>  dextrose 50% Injectable 12.5 Gram(s) IV Push once  dextrose 50% Injectable 25 Gram(s) IV Push once  dextrose 50% Injectable 25 Gram(s) IV Push once  ertapenem  IVPB 1000 milliGRAM(s) IV Intermittent every 24 hours  glucagon  Injectable 1 milliGRAM(s) IntraMuscular once PRN  heparin  Injectable 5000 Unit(s) SubCutaneous every 12 hours  insulin glargine Injectable (LANTUS) 33 Unit(s) SubCutaneous every morning  insulin lispro (HumaLOG) corrective regimen sliding scale   SubCutaneous three times a day before meals  insulin lispro Injectable (HumaLOG) 8 Unit(s) SubCutaneous three times a day before meals  lisinopril 40 milliGRAM(s) Oral daily  NIFEdipine XL 60 milliGRAM(s) Oral daily  pantoprazole    Tablet 40 milliGRAM(s) Oral before breakfast        Assessment and Plan:  69 year-old male with PMH of insulin-dependent DM c/b diabetic foot ulcers s/p amputations of half of left great toe and entire right great toe, PVD, HTN, HLD, GERD, depression, recently amputated right 3rd toe (Dec 4th) right 4th toe (Dec 11) s/p debridement on Dec 11th presented to the Ed for right foot discharge.     # Diabetic foot Ulcer: Right foot  #Acute Osteomyelitis  -no evidence of sepsis on admission  -s/p meropenem and bactrim   -XR R foot: OM w 5th metatarsal; questionable 3rd metatarsal involvement  - Arterial Duplex R: minimal stenosis of tibial artery  - Venous Duplex BL: no DVT  - Vascular on board: no intervention  - Podiatry: Had Dbx 3/16- grew strep. agalactae.  - fu blood cultures- no growth  - esr 54  -s/p surgical debridement, as per Podiatry patient can be d/c home on IV abx. with outpatient Podiatry f/up.   -as per ID patient will be d/c with midline to receive IV Ertapenem tx. and f/up repeated cxs results post dc home.     # Insulin-dependent T2DM  -Takes Humalin 30 units at breakfast, Trulicity 0.5 weekly, and Janumet  BID outpatient  -cw basal bolus insulin at this time  -Monitor fingersticks with meals and at bedtime    # Hypertension  -Takes Benazepril 40 mg qD at home  -C/w Lisinopril 40 mg qD for now      # Hyperlipidemia  -Takes Crestor 10 mg qHS at home  -C/w Atorvastatin 40 mg qHS for now    # GERD  -cw PPI    # Depression  -not on any med  -fu outpt psych    #Progress Note Handoff: Pending  d/c arrangement to home with home care once IV abx. tx. arranged by case management.   Family discussion: medical plan of tx. was d/w pt. by bedside Disposition: Home__with home care possibly today .

## 2020-03-19 NOTE — CHART NOTE - NSCHARTNOTEFT_GEN_A_CORE
<<<RESIDENT DISCHARGE NOTE>>>     JOSÉ MANUEL PORTER  MRN-[id number]    VITAL SIGNS:  T(F): 97.5 (03-19-20 @ 07:30), Max: 97.8 (03-18-20 @ 22:00)  HR: 77 (03-19-20 @ 07:30)  BP: 155/74 (03-19-20 @ 07:30)    PHYSICAL EXAMINATION:  General: NAD, AAOX3, patient is laying comfortably in bed  HEENT: AT, NC, Supple, NO JVD, NO CB  Lungs: CTA B/L, no wheezing, no rhonchi  CVS: normal S1, S2, RRR, NO M/G/R  Abdomen: soft, bowel sounds present, non-tender, non-distended  Extremities: right foot- incision with sutures is well healing, no edema, no clubbing, no cyanosis, positive peripheral pulses b/l  Neuro: no acute focal neurological deficits  Skin: no rash, no ecchymosis  TEST RESULTS:                        10.1   5.96  )-----------( 296      ( 19 Mar 2020 04:30 )             31.3       03-19    139  |  105  |  21<H>  ----------------------------<  156<H>  5.1<H>   |  24  |  1.1    Ca    8.9      19 Mar 2020 04:30        FINAL DISCHARGE INTERVIEW:  Resident(s) Present: (Name:_Dr. Matthews____________), RN Present: (Name:  ___________)    DISCHARGE MEDICATION RECONCILIATION  reviewed with Attending (Name:_Dr. Quezada__________)    DISPOSITION:   [  ] Home,    [ x ] Home with Visiting Nursing Services,   [    ]  SNF/ NH,    [   ] Acute Rehab (4A),   [   ] Other (Specify:_________)

## 2020-03-20 LAB
ANION GAP SERPL CALC-SCNC: 12 MMOL/L — SIGNIFICANT CHANGE UP (ref 7–14)
BASOPHILS # BLD AUTO: 0.02 K/UL — SIGNIFICANT CHANGE UP (ref 0–0.2)
BASOPHILS NFR BLD AUTO: 0.3 % — SIGNIFICANT CHANGE UP (ref 0–1)
BUN SERPL-MCNC: 20 MG/DL — SIGNIFICANT CHANGE UP (ref 10–20)
CALCIUM SERPL-MCNC: 9.6 MG/DL — SIGNIFICANT CHANGE UP (ref 8.5–10.1)
CHLORIDE SERPL-SCNC: 101 MMOL/L — SIGNIFICANT CHANGE UP (ref 98–110)
CO2 SERPL-SCNC: 27 MMOL/L — SIGNIFICANT CHANGE UP (ref 17–32)
CREAT SERPL-MCNC: 0.9 MG/DL — SIGNIFICANT CHANGE UP (ref 0.7–1.5)
EOSINOPHIL # BLD AUTO: 0.12 K/UL — SIGNIFICANT CHANGE UP (ref 0–0.7)
EOSINOPHIL NFR BLD AUTO: 1.8 % — SIGNIFICANT CHANGE UP (ref 0–8)
GLUCOSE BLDC GLUCOMTR-MCNC: 139 MG/DL — HIGH (ref 70–99)
GLUCOSE BLDC GLUCOMTR-MCNC: 178 MG/DL — HIGH (ref 70–99)
GLUCOSE BLDC GLUCOMTR-MCNC: 198 MG/DL — HIGH (ref 70–99)
GLUCOSE BLDC GLUCOMTR-MCNC: 243 MG/DL — HIGH (ref 70–99)
GLUCOSE SERPL-MCNC: 214 MG/DL — HIGH (ref 70–99)
HCT VFR BLD CALC: 32.4 % — LOW (ref 42–52)
HGB BLD-MCNC: 10.7 G/DL — LOW (ref 14–18)
IMM GRANULOCYTES NFR BLD AUTO: 0.7 % — HIGH (ref 0.1–0.3)
LYMPHOCYTES # BLD AUTO: 1.17 K/UL — LOW (ref 1.2–3.4)
LYMPHOCYTES # BLD AUTO: 17.5 % — LOW (ref 20.5–51.1)
MAGNESIUM SERPL-MCNC: 2 MG/DL — SIGNIFICANT CHANGE UP (ref 1.8–2.4)
MCHC RBC-ENTMCNC: 29.6 PG — SIGNIFICANT CHANGE UP (ref 27–31)
MCHC RBC-ENTMCNC: 33 G/DL — SIGNIFICANT CHANGE UP (ref 32–37)
MCV RBC AUTO: 89.5 FL — SIGNIFICANT CHANGE UP (ref 80–94)
MONOCYTES # BLD AUTO: 0.55 K/UL — SIGNIFICANT CHANGE UP (ref 0.1–0.6)
MONOCYTES NFR BLD AUTO: 8.2 % — SIGNIFICANT CHANGE UP (ref 1.7–9.3)
NEUTROPHILS # BLD AUTO: 4.76 K/UL — SIGNIFICANT CHANGE UP (ref 1.4–6.5)
NEUTROPHILS NFR BLD AUTO: 71.5 % — SIGNIFICANT CHANGE UP (ref 42.2–75.2)
NRBC # BLD: 0 /100 WBCS — SIGNIFICANT CHANGE UP (ref 0–0)
PLATELET # BLD AUTO: 333 K/UL — SIGNIFICANT CHANGE UP (ref 130–400)
POTASSIUM SERPL-MCNC: 4.9 MMOL/L — SIGNIFICANT CHANGE UP (ref 3.5–5)
POTASSIUM SERPL-SCNC: 4.9 MMOL/L — SIGNIFICANT CHANGE UP (ref 3.5–5)
RBC # BLD: 3.62 M/UL — LOW (ref 4.7–6.1)
RBC # FLD: 14.3 % — SIGNIFICANT CHANGE UP (ref 11.5–14.5)
SODIUM SERPL-SCNC: 140 MMOL/L — SIGNIFICANT CHANGE UP (ref 135–146)
WBC # BLD: 6.67 K/UL — SIGNIFICANT CHANGE UP (ref 4.8–10.8)
WBC # FLD AUTO: 6.67 K/UL — SIGNIFICANT CHANGE UP (ref 4.8–10.8)

## 2020-03-20 PROCEDURE — 99233 SBSQ HOSP IP/OBS HIGH 50: CPT

## 2020-03-20 RX ADMIN — HEPARIN SODIUM 5000 UNIT(S): 5000 INJECTION INTRAVENOUS; SUBCUTANEOUS at 05:30

## 2020-03-20 RX ADMIN — Medication 1: at 08:22

## 2020-03-20 RX ADMIN — Medication 81 MILLIGRAM(S): at 12:15

## 2020-03-20 RX ADMIN — PANTOPRAZOLE SODIUM 40 MILLIGRAM(S): 20 TABLET, DELAYED RELEASE ORAL at 08:22

## 2020-03-20 RX ADMIN — Medication 2: at 12:16

## 2020-03-20 RX ADMIN — Medication 8 UNIT(S): at 12:16

## 2020-03-20 RX ADMIN — HEPARIN SODIUM 5000 UNIT(S): 5000 INJECTION INTRAVENOUS; SUBCUTANEOUS at 17:23

## 2020-03-20 RX ADMIN — Medication 60 MILLIGRAM(S): at 05:30

## 2020-03-20 RX ADMIN — INSULIN GLARGINE 33 UNIT(S): 100 INJECTION, SOLUTION SUBCUTANEOUS at 08:21

## 2020-03-20 RX ADMIN — LISINOPRIL 40 MILLIGRAM(S): 2.5 TABLET ORAL at 05:30

## 2020-03-20 RX ADMIN — ATORVASTATIN CALCIUM 40 MILLIGRAM(S): 80 TABLET, FILM COATED ORAL at 21:35

## 2020-03-20 RX ADMIN — Medication 8 UNIT(S): at 08:21

## 2020-03-20 RX ADMIN — Medication 8 UNIT(S): at 17:22

## 2020-03-20 RX ADMIN — ERTAPENEM SODIUM 120 MILLIGRAM(S): 1 INJECTION, POWDER, LYOPHILIZED, FOR SOLUTION INTRAMUSCULAR; INTRAVENOUS at 05:30

## 2020-03-20 RX ADMIN — Medication 1: at 17:21

## 2020-03-20 NOTE — PROGRESS NOTE ADULT - SUBJECTIVE AND OBJECTIVE BOX
JOSÉ MANUEL PORTER 70y Male  MRN#: [id number]   CODE STATUS: FULL      SUBJECTIVE  Patient is a 70y old Male who presents with a chief complaint of Diabetic foot ulcer (19 Mar 2020 09:18)    Currently admitted to medicine with the primary diagnosis of Osteomyelitis of 5th right metatarsal    Today is hospital day 8d,   OVERNIGHT EVENTS:  none  This morning he is laying in bed comfortably .   Denies any foot pain, dressing was changed today morning.     Urinating and stooling appropriately.    Present Today:           Basilio Catheter (x)No/ ()Yes?   Indication:             Central Line (x)No/ ()Yes?   Indication:          IV Fluids (x)No/ ()Yes? Type:  Rate:  Indication:    OBJECTIVE  PAST MEDICAL & SURGICAL HISTORY  GERD (gastroesophageal reflux disease)  Depression  Diabetic foot ulcer  Dyslipidemia  HTN (hypertension)  DM (diabetes mellitus): 12/27/18 hgb aic  11.2  Toe amputation status, right: (2008)    ALLERGIES:  No Known Allergies    HOME MEDICATIONS:  Home Medications:  benazepril 40 mg oral tablet: 1 tab(s) orally once a day (12 Mar 2020 19:24)  Crestor 10 mg oral tablet: 1 tab(s) orally once a day (at bedtime) (12 Mar 2020 19:24)  Janumet 50 mg-1000 mg oral tablet: 1 tab(s) orally 2 times a day (12 Mar 2020 19:24)  Lantus 100 units/mL subcutaneous solution: 30 unit(s) subcutaneous once a day in the morning (12 Mar 2020 19:24)  omeprazole 20 mg oral delayed release capsule: 1 cap(s) orally once a day (12 Mar 2020 19:24)  Trulicity Pen 0.75 mg/0.5 mL subcutaneous solution:  (12 Mar 2020 19:24)    MEDICATIONS:  STANDING MEDICATIONS  aspirin enteric coated 81 milliGRAM(s) Oral daily  atorvastatin 40 milliGRAM(s) Oral at bedtime  chlorhexidine 4% Liquid 1 Application(s) Topical <User Schedule>  dextrose 5%. 1000 milliLiter(s) (50 mL/Hr) IV Continuous <Continuous>  dextrose 50% Injectable 12.5 Gram(s) IV Push once  dextrose 50% Injectable 25 Gram(s) IV Push once  dextrose 50% Injectable 25 Gram(s) IV Push once  ertapenem  IVPB 1000 milliGRAM(s) IV Intermittent every 24 hours  heparin  Injectable 5000 Unit(s) SubCutaneous every 12 hours  insulin glargine Injectable (LANTUS) 33 Unit(s) SubCutaneous every morning  insulin lispro (HumaLOG) corrective regimen sliding scale   SubCutaneous three times a day before meals  insulin lispro Injectable (HumaLOG) 8 Unit(s) SubCutaneous three times a day before meals  lisinopril 40 milliGRAM(s) Oral daily  NIFEdipine XL 60 milliGRAM(s) Oral daily  pantoprazole    Tablet 40 milliGRAM(s) Oral before breakfast    PRN MEDICATIONS  dextrose 40% Gel 15 Gram(s) Oral once PRN  glucagon  Injectable 1 milliGRAM(s) IntraMuscular once PRN      VITAL SIGNS: Last 24 Hours  T(C): 36.1 (20 Mar 2020 07:59), Max: 36.4 (19 Mar 2020 16:00)  T(F): 96.9 (20 Mar 2020 07:59), Max: 97.6 (19 Mar 2020 16:00)  HR: 72 (20 Mar 2020 07:59) (72 - 82)  BP: 133/83 (20 Mar 2020 07:59) (133/83 - 165/72)  RR: 18 (20 Mar 2020 07:59) (14 - 18)  LABS:                        10.7   6.67  )-----------( 333      ( 20 Mar 2020 06:11 )             32.4     03-20    140  |  101  |  20  ----------------------------<  214<H>  4.9   |  27  |  0.9    Ca    9.6      20 Mar 2020 06:11  Mg     2.0     03-20    RADIOLOGY:          ECHO:      PHYSICAL EXAM:    GENERAL: NAD, well-developed, AAOx3  HEENT:  Atraumatic, Normocephalic. EOMI, PERRLA, conjunctiva and sclera clear, No JVD  PULMONARY: Clear to auscultation bilaterally; No wheeze  CARDIOVASCULAR: Regular rate and rhythm; No murmurs, rubs, or gallops  GASTROINTESTINAL: Soft, Nontender, Nondistended; Bowel sounds present  MUSCULOSKELETAL: right foot incision with sutures is well healing, no edema, no clubbing, no cyanosis, positive peripheral pulses b/l  NEUROLOGY: non-focal  SKIN: No rashes or lesions    ASSESSMENT & PLAN    69 year-old male with PMH of insulin-dependent DM c/b diabetic foot ulcers s/p amputations of half of left great toe and entire right great toe, PVD, HTN, HLD, GERD, depression, recently amputated right 3rd toe (Dec 4th) right 4th toe (Dec 11) s/p debridement on Dec 11th presented to the Ed for right foot discharge since yesterday.     # Osteomyelitis of 5th right metatarsal; possible osteomyelitis of 3rd right metatarsal, so systemic symptoms. s/p 5th Metatarsal Resection Right Foot    s/p Pulsed irrigation of wound 16-Mar-2020 Biopsy, bone, toe, open 16-Mar-2020 13:06:54 Resection, bone, metatarsal     3/16 OR swab  Numerous Streptococcus agalactiae (Group B)     Per Pod 5th met healthy bone, 3rd met soft bone concerning for possible OM-- pending biopsy, soft bone     Hx enterobacter & stenotrophomonas 12/2019   Sedimentation Rate, Erythrocyte: 54 mm/Hr (03.13.20 @ 07:36) C-Reactive Protein, Serum: 7.31 mg/dL (03.13.20 @ 07:36)    BCx 3/13 NG  -no evidence of sepsis on admission  -XR R foot: OM w 5th metatarsal; questionable 3rd metatarsal involvement  - Arterial Duplex R: minimal stenosis of tibial artery  - Venous Duplex BL: no DVT  - Vascular on board: no intervention  - blood cultures - negative   - esr 54/ crp 7.31  -s/p OR for debridement and wound washout on 3/16  -ID eval: will withold antibiotics until bone cultures taken, started on meropenem on 3/17/20  Plan:  f/u OR cultures , path pending  plan for midline x ertapenem 1g q24h IV (given hx GNRs) x 14 days from OR and if path of 3rd Met is + will prolong another 14 days then possible levaquin PO.   - Weekly CBC, BMP  - ID follow-up with Tues Dr. Gary Turpin 3/24 12:30 PM       [address]       [phone] (call to make an appointment, walk-ins Tuesdays 10:30 AM)      Fax [phone]    - f/u bone cultures and adjust abx accordingly  - podiatry:  Wound Care Orders; Flush w/ Normal Saline; Apply Betadine Soaked Adaptic / DSD / Kerlix  Continue w/ Local Wound Care; Dressing Changes Q24 / Betadine Adaptic   Weight Bearing Status; NWB Right Foot; Can Apply Pressure to Heel to Transition w/ Surgical Shoe  - PT- walked 250ft, rec: home with assistance  - scripts given for IV Abx and wound care dressing    # Insulin-dependent T2DM  -Takes Humalin 30 units at breakfast, Trulicity 0.5 weekly, and Janumet  BID outpatient  -cw basal bolus insulin at this time  -Monitor fingersticks with meals and at bedtime    # Hypertension  -Takes Benazepril 40 mg qD at home  -C/w Lisinopril 40 mg qD for now  -Monitor BP    # Hyperlipidemia  -Takes Crestor 10 mg qHS at home  -C/w Atorvastatin 40 mg qHS for now    # GERD  -cw PPI    # Depression  -not on any med  -fu outpt psych    DVT ppx: Heparin   GI ppx: Protonix  Diet: DASH/TLC consistent carb  Activity: OOBTC  Code status: FULL CODE  Dispo: From home,   Pending  d/c arrangement to home with home care once IV abx. tx. arranged by case management.

## 2020-03-20 NOTE — PROGRESS NOTE ADULT - ATTENDING COMMENTS
69 year-old male with PMH of insulin-dependent DM c/b diabetic foot ulcers s/p amputations of half of left great toe and entire right great toe, PVD, HTN, HLD, GERD, depression, recently amputated right 3rd toe (Dec 4th) right 4th toe (Dec 11) s/p debridement on Dec 11th presented to the Ed for right foot discharge.     # Diabetic foot Ulcer: Right foot  #Acute Osteomyelitis  -no evidence of sepsis on admission  -s/p meropenem and bactrim   -XR R foot: OM w 5th metatarsal; questionable 3rd metatarsal involvement  - Arterial Duplex R: minimal stenosis of tibial artery  - Venous Duplex BL: no DVT  - Vascular on board: no intervention  - Podiatry: Had Dbx 3/16- grew strep. agalactae.  - fu blood cultures- no growth  - esr 54  -s/p surgical debridement, as per Podiatry patient can be d/c home on IV abx. with outpatient Podiatry f/up.   -as per ID patient will be d/c with midline to receive IV Ertapenem tx. and f/up repeated cxs results post dc home.     # Insulin-dependent T2DM  -Takes Humalin 30 units at breakfast, Trulicity 0.5 weekly, and Janumet  BID outpatient  -cw basal bolus insulin at this time  -Monitor fingersticks with meals and at bedtime    # Hypertension  -Takes Benazepril 40 mg qD at home  -C/w Lisinopril 40 mg qD for now      # Hyperlipidemia  -Takes Crestor 10 mg qHS at home  -C/w Atorvastatin 40 mg qHS for now    # GERD  -cw PPI    # Depression  -not on any med  -fu outpt psych    #Progress Note Handoff: Pending  d/c arrangement to home with home care once IV abx. tx. arranged by case management.   Family discussion: medical plan of tx. was d/w pt. by bedside Disposition: Home__with home care possibly today .

## 2020-03-21 LAB
GLUCOSE BLDC GLUCOMTR-MCNC: 129 MG/DL — HIGH (ref 70–99)
GLUCOSE BLDC GLUCOMTR-MCNC: 149 MG/DL — HIGH (ref 70–99)
GLUCOSE BLDC GLUCOMTR-MCNC: 161 MG/DL — HIGH (ref 70–99)
GLUCOSE BLDC GLUCOMTR-MCNC: 266 MG/DL — HIGH (ref 70–99)

## 2020-03-21 PROCEDURE — 99232 SBSQ HOSP IP/OBS MODERATE 35: CPT

## 2020-03-21 RX ADMIN — HEPARIN SODIUM 5000 UNIT(S): 5000 INJECTION INTRAVENOUS; SUBCUTANEOUS at 05:16

## 2020-03-21 RX ADMIN — Medication 1: at 17:14

## 2020-03-21 RX ADMIN — INSULIN GLARGINE 33 UNIT(S): 100 INJECTION, SOLUTION SUBCUTANEOUS at 08:06

## 2020-03-21 RX ADMIN — Medication 3: at 12:19

## 2020-03-21 RX ADMIN — Medication 8 UNIT(S): at 12:19

## 2020-03-21 RX ADMIN — PANTOPRAZOLE SODIUM 40 MILLIGRAM(S): 20 TABLET, DELAYED RELEASE ORAL at 05:17

## 2020-03-21 RX ADMIN — ERTAPENEM SODIUM 120 MILLIGRAM(S): 1 INJECTION, POWDER, LYOPHILIZED, FOR SOLUTION INTRAMUSCULAR; INTRAVENOUS at 05:17

## 2020-03-21 RX ADMIN — Medication 8 UNIT(S): at 08:06

## 2020-03-21 RX ADMIN — Medication 81 MILLIGRAM(S): at 12:20

## 2020-03-21 RX ADMIN — LISINOPRIL 40 MILLIGRAM(S): 2.5 TABLET ORAL at 05:17

## 2020-03-21 RX ADMIN — HEPARIN SODIUM 5000 UNIT(S): 5000 INJECTION INTRAVENOUS; SUBCUTANEOUS at 17:15

## 2020-03-21 RX ADMIN — Medication 8 UNIT(S): at 17:14

## 2020-03-21 RX ADMIN — Medication 60 MILLIGRAM(S): at 05:17

## 2020-03-21 RX ADMIN — ATORVASTATIN CALCIUM 40 MILLIGRAM(S): 80 TABLET, FILM COATED ORAL at 21:37

## 2020-03-21 NOTE — PROGRESS NOTE ADULT - SUBJECTIVE AND OBJECTIVE BOX
JOSÉ MANUEL PORTER 70y Male  MRN#: [id number]   CODE STATUS: FULL      SUBJECTIVE  Patient is a 70y old Male who presents with a chief complaint of Diabetic foot ulcer (20 Mar 2020 12:46)    Currently admitted to medicine with the primary diagnosis of Osteomyelitis of 5th right metatarsal      Today is hospital day 9d,   OVERNIGHT EVENTS: none. Waiting to go home. Agrees to stay until monday.       Present Today:           Basilio Catheter (x)No/ ()Yes?   Indication:             Central Line (x)No/ ()Yes?   Indication:          IV Fluids (x)No/ ()Yes? Type:  Rate:  Indication:    OBJECTIVE  PAST MEDICAL & SURGICAL HISTORY  GERD (gastroesophageal reflux disease)  Depression  Diabetic foot ulcer  Dyslipidemia  HTN (hypertension)  DM (diabetes mellitus): 12/27/18 hgb aic  11.2  Toe amputation status, right: (2008)    ALLERGIES:  No Known Allergies    HOME MEDICATIONS:  Home Medications:  benazepril 40 mg oral tablet: 1 tab(s) orally once a day (12 Mar 2020 19:24)  Crestor 10 mg oral tablet: 1 tab(s) orally once a day (at bedtime) (12 Mar 2020 19:24)  Janumet 50 mg-1000 mg oral tablet: 1 tab(s) orally 2 times a day (12 Mar 2020 19:24)  Lantus 100 units/mL subcutaneous solution: 30 unit(s) subcutaneous once a day in the morning (12 Mar 2020 19:24)  omeprazole 20 mg oral delayed release capsule: 1 cap(s) orally once a day (12 Mar 2020 19:24)  Trulicity Pen 0.75 mg/0.5 mL subcutaneous solution:  (12 Mar 2020 19:24)    MEDICATIONS:  STANDING MEDICATIONS  aspirin enteric coated 81 milliGRAM(s) Oral daily  atorvastatin 40 milliGRAM(s) Oral at bedtime  chlorhexidine 4% Liquid 1 Application(s) Topical <User Schedule>  dextrose 5%. 1000 milliLiter(s) (50 mL/Hr) IV Continuous <Continuous>  dextrose 50% Injectable 12.5 Gram(s) IV Push once  dextrose 50% Injectable 25 Gram(s) IV Push once  dextrose 50% Injectable 25 Gram(s) IV Push once  ertapenem  IVPB 1000 milliGRAM(s) IV Intermittent every 24 hours  heparin  Injectable 5000 Unit(s) SubCutaneous every 12 hours  insulin glargine Injectable (LANTUS) 33 Unit(s) SubCutaneous every morning  insulin lispro (HumaLOG) corrective regimen sliding scale   SubCutaneous three times a day before meals  insulin lispro Injectable (HumaLOG) 8 Unit(s) SubCutaneous three times a day before meals  lisinopril 40 milliGRAM(s) Oral daily  NIFEdipine XL 60 milliGRAM(s) Oral daily  pantoprazole    Tablet 40 milliGRAM(s) Oral before breakfast    PRN MEDICATIONS  dextrose 40% Gel 15 Gram(s) Oral once PRN  glucagon  Injectable 1 milliGRAM(s) IntraMuscular once PRN      VITAL SIGNS: Last 24 Hours  T(C): 36.2 (21 Mar 2020 07:58), Max: 36.6 (21 Mar 2020 00:00)  T(F): 97.2 (21 Mar 2020 07:58), Max: 97.8 (21 Mar 2020 00:00)  HR: 77 (21 Mar 2020 07:58) (73 - 77)  BP: 121/58 (21 Mar 2020 07:58) (121/58 - 132/60)  RR: 18 (21 Mar 2020 07:58) (18 - 18)    LABS:                        10.7   6.67  )-----------( 333      ( 20 Mar 2020 06:11 )             32.4     03-20    140  |  101  |  20  ----------------------------<  214<H>  4.9   |  27  |  0.9    Ca    9.6      20 Mar 2020 06:11  Mg     2.0     03-20    RADIOLOGY:          ECHO:      PHYSICAL EXAM:    GENERAL: NAD, well-developed, AAOx3  HEENT:  Atraumatic, Normocephalic. EOMI, PERRLA, conjunctiva and sclera clear, No JVD  PULMONARY: Clear to auscultation bilaterally; No wheeze  CARDIOVASCULAR: Regular rate and rhythm; No murmurs, rubs, or gallops  GASTROINTESTINAL: Soft, Nontender, Nondistended; Bowel sounds present  MUSCULOSKELETAL: right foot incision with sutures is well healing, no edema, no clubbing, no cyanosis, positive peripheral pulses b/l  NEUROLOGY: non-focal  SKIN: No rashes or lesions    ASSESSMENT & PLAN    69 year-old male with PMH of insulin-dependent DM c/b diabetic foot ulcers s/p amputations of half of left great toe and entire right great toe, PVD, HTN, HLD, GERD, depression, recently amputated right 3rd toe (Dec 4th) right 4th toe (Dec 11) s/p debridement on Dec 11th presented to the Ed for right foot discharge since yesterday.     # Osteomyelitis of 5th right metatarsal; possible osteomyelitis of 3rd right metatarsal, so systemic symptoms. s/p 5th Metatarsal Resection Right Foot    s/p Pulsed irrigation of wound 16-Mar-2020 Biopsy, bone, toe, open 16-Mar-2020 13:06:54 Resection, bone, metatarsal     3/16 OR swab  Numerous Streptococcus agalactiae (Group B)     Per Pod 5th met healthy bone, 3rd met soft bone concerning for possible OM-- pending biopsy, soft bone     Hx enterobacter & stenotrophomonas 12/2019   Sedimentation Rate, Erythrocyte: 54 mm/Hr (03.13.20 @ 07:36) C-Reactive Protein, Serum: 7.31 mg/dL (03.13.20 @ 07:36)    BCx 3/13 NG  -no evidence of sepsis on admission  -XR R foot: OM w 5th metatarsal; questionable 3rd metatarsal involvement  - Arterial Duplex R: minimal stenosis of tibial artery  - Venous Duplex BL: no DVT  - Vascular on board: no intervention  - blood cultures - negative   - esr 54/ crp 7.31  -s/p OR for debridement and wound washout on 3/16  -ID eval: will withold antibiotics until bone cultures taken, started on meropenem on 3/17/20  Plan:  f/u OR cultures , path pending  plan for midline x ertapenem 1g q24h IV (given hx GNRs) x 14 days from OR and if path of 3rd Met is + will prolong another 14 days then possible levaquin PO.   - Weekly CBC, BMP  - ID follow-up with Tues Dr. Gary Turpin 3/24 12:30 PM       [address]       [phone] (call to make an appointment, walk-ins Tuesdays 10:30 AM)      Fax [phone]    - f/u bone cultures and adjust abx accordingly  - podiatry:  Wound Care Orders; Flush w/ Normal Saline; Apply Betadine Soaked Adaptic / DSD / Kerlix  Continue w/ Local Wound Care; Dressing Changes Q24 / Betadine Adaptic   Weight Bearing Status; NWB Right Foot; Can Apply Pressure to Heel to Transition w/ Surgical Shoe  - PT- walked 250ft, rec: home with assistance  - scripts given for IV Abx and wound care dressing    # Insulin-dependent T2DM  -Takes Humalin 30 units at breakfast, Trulicity 0.5 weekly, and Janumet  BID outpatient  -cw basal bolus insulin at this time  -Monitor fingersticks with meals and at bedtime    # Hypertension  -Takes Benazepril 40 mg qD at home  -C/w Lisinopril 40 mg qD for now  -Monitor BP    # Hyperlipidemia  -Takes Crestor 10 mg qHS at home  -C/w Atorvastatin 40 mg qHS for now    # GERD  -cw PPI    # Depression  -not on any med  -fu outpt psych    DVT ppx: Heparin   GI ppx: Protonix  Diet: DASH/TLC consistent carb  Activity: OOBTC  Code status: FULL CODE  Dispo: From home,   Pending  d/c arrangement to home with home care once IV abx. tx. arranged by case management. possible dc on moday as insurance company is closed over weekend

## 2020-03-21 NOTE — PROGRESS NOTE ADULT - ATTENDING COMMENTS
69 year-old male with PMH of insulin-dependent DM c/b diabetic foot ulcers s/p amputations of half of left great toe and entire right great toe, PVD, HTN, HLD, GERD, depression, recently amputated right 3rd toe (Dec 4th) right 4th toe (Dec 11) s/p debridement on Dec 11th presented to the Ed for right foot discharge.     # Diabetic foot Ulcer: Right foot  #Acute Osteomyelitis  -no evidence of sepsis on admission  -s/p meropenem and bactrim   -XR R foot: OM w 5th metatarsal; questionable 3rd metatarsal involvement  - Arterial Duplex R: minimal stenosis of tibial artery  - Venous Duplex BL: no DVT  - Vascular on board: no intervention  - Podiatry: Had Dbx 3/16- grew strep. agalactae.  - fu blood cultures- no growth  - esr 54  -s/p surgical debridement, as per Podiatry patient can be d/c home on IV abx. with outpatient Podiatry f/up.   -as per ID patient will be d/c with midline to receive IV Ertapenem tx. and f/up repeated cxs results post dc home.     # Insulin-dependent T2DM  -Takes Humalin 30 units at breakfast, Trulicity 0.5 weekly, and Janumet  BID outpatient  -cw basal bolus insulin at this time  -Monitor fingersticks with meals and at bedtime    # Hypertension  -Takes Benazepril 40 mg qD at home  -C/w Lisinopril 40 mg qD for now      # Hyperlipidemia  -Takes Crestor 10 mg qHS at home  -C/w Atorvastatin 40 mg qHS for now    # GERD  -cw PPI    # Depression  -not on any med  -fu outpt psych    #Progress Note Handoff: Pending  d/c arrangement to home with home care once IV abx. tx. arranged by case management.   Family discussion: medical plan of tx. was d/w pt. by bedside Disposition: Home__with home care possibly on monday.

## 2020-03-22 LAB
GLUCOSE BLDC GLUCOMTR-MCNC: 123 MG/DL — HIGH (ref 70–99)
GLUCOSE BLDC GLUCOMTR-MCNC: 148 MG/DL — HIGH (ref 70–99)
GLUCOSE BLDC GLUCOMTR-MCNC: 172 MG/DL — HIGH (ref 70–99)
GLUCOSE BLDC GLUCOMTR-MCNC: 201 MG/DL — HIGH (ref 70–99)

## 2020-03-22 PROCEDURE — 99232 SBSQ HOSP IP/OBS MODERATE 35: CPT

## 2020-03-22 RX ADMIN — LISINOPRIL 40 MILLIGRAM(S): 2.5 TABLET ORAL at 05:48

## 2020-03-22 RX ADMIN — Medication 8 UNIT(S): at 17:39

## 2020-03-22 RX ADMIN — Medication 60 MILLIGRAM(S): at 05:48

## 2020-03-22 RX ADMIN — HEPARIN SODIUM 5000 UNIT(S): 5000 INJECTION INTRAVENOUS; SUBCUTANEOUS at 17:38

## 2020-03-22 RX ADMIN — ATORVASTATIN CALCIUM 40 MILLIGRAM(S): 80 TABLET, FILM COATED ORAL at 21:33

## 2020-03-22 RX ADMIN — CHLORHEXIDINE GLUCONATE 1 APPLICATION(S): 213 SOLUTION TOPICAL at 05:48

## 2020-03-22 RX ADMIN — Medication 8 UNIT(S): at 08:21

## 2020-03-22 RX ADMIN — HEPARIN SODIUM 5000 UNIT(S): 5000 INJECTION INTRAVENOUS; SUBCUTANEOUS at 05:48

## 2020-03-22 RX ADMIN — Medication 81 MILLIGRAM(S): at 12:11

## 2020-03-22 RX ADMIN — PANTOPRAZOLE SODIUM 40 MILLIGRAM(S): 20 TABLET, DELAYED RELEASE ORAL at 05:48

## 2020-03-22 RX ADMIN — Medication 8 UNIT(S): at 12:11

## 2020-03-22 RX ADMIN — INSULIN GLARGINE 33 UNIT(S): 100 INJECTION, SOLUTION SUBCUTANEOUS at 08:21

## 2020-03-22 RX ADMIN — Medication 1: at 17:38

## 2020-03-22 RX ADMIN — ERTAPENEM SODIUM 120 MILLIGRAM(S): 1 INJECTION, POWDER, LYOPHILIZED, FOR SOLUTION INTRAMUSCULAR; INTRAVENOUS at 05:47

## 2020-03-22 NOTE — PROGRESS NOTE ADULT - SUBJECTIVE AND OBJECTIVE BOX
JOSÉ MANUEL PORTER  [facility] [address] ([facility])          Patient was evaluated and examined  by bedside, no active complains, patient awaiting to be d/c home once IV abx. is arranged.        REVIEW OF SYSTEMS:  please see pertinent positives mentioned above, all other 12 ROS negative      T(C): , Max: 36.6 (03-22-20 @ 00:00)  HR: 77 (03-22-20 @ 07:42)  BP: 111/58 (03-22-20 @ 07:42)  RR: 18 (03-22-20 @ 07:42)  SpO2: --  CAPILLARY BLOOD GLUCOSE      POCT Blood Glucose.: 123 mg/dL (22 Mar 2020 11:44)  POCT Blood Glucose.: 148 mg/dL (22 Mar 2020 08:10)  POCT Blood Glucose.: 149 mg/dL (21 Mar 2020 21:21)  POCT Blood Glucose.: 161 mg/dL (21 Mar 2020 17:04)      PHYSICAL EXAM:  General: NAD, AAOX3, patient is laying comfortably in bed  HEENT: AT, NC, Supple, NO JVD, NO CB  Lungs: CTA B/L, no wheezing, no rhonchi  CVS: normal S1, S2, RRR, NO M/G/R  Abdomen: soft, bowel sounds present, non-tender, non-distended  Extremities: right foot- incision with sutures is well healing, no edema, no clubbing, no cyanosis, positive peripheral pulses b/l  Neuro: no acute focal neurological deficits  Skin: no rash, no ecchymosis        LAB  CBC  Date: 03-20-20 @ 06:11  Mean cell Txvfhufdkr91.6  Mean cell Hemoglobin Conc33.0  Mean cell Volum 89.5  Platelet count-Automate 333  RBC Count 3.62  Red Cell Distrib Width14.3  WBC Count6.67  % Albumin, Urine--  Hematocrit 32.4  Hemoglobin 10.7  CBC  Date: 03-19-20 @ 04:30  Mean cell Zxegcfqsvg58.5  Mean cell Hemoglobin Conc32.3  Mean cell Volum 88.4  Platelet count-Automate 296  RBC Count 3.54  Red Cell Distrib Width14.3  WBC Count5.96  % Albumin, Urine--  Hematocrit 31.3  Hemoglobin 10.1  CBC  Date: 03-18-20 @ 06:41  Mean cell Xvtrjpachw07.6  Mean cell Hemoglobin Conc32.8  Mean cell Volum 90.4  Platelet count-Automate 298  RBC Count 3.34  Red Cell Distrib Width14.5  WBC Count6.01  % Albumin, Urine--  Hematocrit 30.2  Hemoglobin 9.9  CBC  Date: 03-17-20 @ 07:13  Mean cell Mfwhdwolzp01.3  Mean cell Hemoglobin Conc32.7  Mean cell Volum 89.8  Platelet count-Automate 290  RBC Count 3.34  Red Cell Distrib Width14.6  WBC Count8.39  % Albumin, Urine--  Hematocrit 30.0  Hemoglobin 9.8  CBC  Date: 03-16-20 @ 06:28  Mean cell Afjmwpqetv64.7  Mean cell Hemoglobin Conc32.3  Mean cell Volum 88.9  Platelet count-Automate 300  RBC Count 3.41  Red Cell Distrib Width14.6  WBC Count5.21  % Albumin, Urine--  Hematocrit 30.3  Hemoglobin 9.8    [facility]  03-20-20 @ 06:11  Blood Gas Arterial-Calcium,Ionized--  Blood Urea Nitrogen, Serum 20 mg/dL [10 - 20]  Carbon Dioxide, Serum27 mmol/L [17 - 32]  Chloride, Vnpey299 mmol/L [98 - 110]  Creatinie, Serum0.9 mg/dL [0.7 - 1.5]  Glucose, Tgyaf086 mg/dL<H> [70 - 99]  Potassium, Serum4.9 mmol/L [3.5 - 5.0]  Sodium, Serum 140 mmol/L [135 - 146]  [facility]  03-19-20 @ 04:30  Blood Gas Arterial-Calcium,Ionized--  Blood Urea Nitrogen, Serum 21 mg/dL<H> [10 - 20]  Carbon Dioxide, Serum24 mmol/L [17 - 32]  Chloride, Zxsbg953 mmol/L [98 - 110]  Creatinie, Serum1.1 mg/dL [0.7 - 1.5]  Glucose, Qjeei890 mg/dL<H> [70 - 99]  Potassium, Serum5.1 mmol/L<H> [3.5 - 5.0] [Slighty Hemolyzed use with Caution]  Sodium, Serum 139 mmol/L [135 - 146]  [facility]  03-18-20 @ 06:41  Blood Gas Arterial-Calcium,Ionized--  Blood Urea Nitrogen, Serum 25 mg/dL<H> [10 - 20]  Carbon Dioxide, Serum25 mmol/L [17 - 32]  Chloride, Zwmur521 mmol/L [98 - 110]  Creatinie, Serum1.0 mg/dL [0.7 - 1.5]  Glucose, Vpljm147 mg/dL<H> [70 - 99]  Potassium, Serum4.5 mmol/L [3.5 - 5.0]  Sodium, Serum 142 mmol/L [135 - 146]              Microbiology:    Culture - Surgical Swab (collected 03-16-20 @ 12:26)  Source: .Surgical Swab right foot  Final Report (03-22-20 @ 09:27):    Numerous Streptococcus agalactiae (Group B) isolated    Group B streptococci are susceptible to ampicillin,    penicillin and cefazolin, but may be resistant to    erythromycin and clindamycin.    Recommendations for intrapartum prophylaxis for Group B    streptococci are penicillin or ampicillin.    Culture - Blood (collected 03-13-20 @ 07:36)  Source: .Blood None  Final Report (03-18-20 @ 14:00):    No growth at 5 days.        Medications:  aspirin enteric coated 81 milliGRAM(s) Oral daily  atorvastatin 40 milliGRAM(s) Oral at bedtime  chlorhexidine 4% Liquid 1 Application(s) Topical <User Schedule>  dextrose 40% Gel 15 Gram(s) Oral once PRN  dextrose 5%. 1000 milliLiter(s) IV Continuous <Continuous>  dextrose 50% Injectable 12.5 Gram(s) IV Push once  dextrose 50% Injectable 25 Gram(s) IV Push once  dextrose 50% Injectable 25 Gram(s) IV Push once  ertapenem  IVPB 1000 milliGRAM(s) IV Intermittent every 24 hours  glucagon  Injectable 1 milliGRAM(s) IntraMuscular once PRN  heparin  Injectable 5000 Unit(s) SubCutaneous every 12 hours  insulin glargine Injectable (LANTUS) 33 Unit(s) SubCutaneous every morning  insulin lispro (HumaLOG) corrective regimen sliding scale   SubCutaneous three times a day before meals  insulin lispro Injectable (HumaLOG) 8 Unit(s) SubCutaneous three times a day before meals  lisinopril 40 milliGRAM(s) Oral daily  NIFEdipine XL 60 milliGRAM(s) Oral daily  pantoprazole    Tablet 40 milliGRAM(s) Oral before breakfast        Assessment and Plan:  # Diabetic foot Ulcer: Right foot  #Acute Osteomyelitis  -no evidence of sepsis on admission  -s/p meropenem and bactrim   -XR R foot: OM w 5th metatarsal; questionable 3rd metatarsal involvement  - Arterial Duplex R: minimal stenosis of tibial artery  - Venous Duplex BL: no DVT  - Vascular on board: no intervention  - Podiatry: Had Dbx 3/16- grew strep. agalactae.  -  blood cultures- no growth  - esr 54  -s/p surgical debridement, as per Podiatry patient can be d/c home on IV abx. with outpatient Podiatry f/up.   -as per ID patient will be d/c with midline to receive IV Ertapenem tx. and f/up repeated cxs results post dc home.     # Insulin-dependent T2DM  -Takes Humalin 30 units at breakfast, Trulicity 0.5 weekly, and Janumet  BID outpatient  -cw basal bolus insulin at this time  -Monitor fingersticks with meals and at bedtime    # Hypertension  -Takes Benazepril 40 mg qD at home  -C/w Lisinopril 40 mg qD for now      # Hyperlipidemia  -Takes Crestor 10 mg qHS at home  -C/w Atorvastatin 40 mg qHS for now    # GERD  -cw PPI    # Depression  -not on any med  -fu outpt psych    #Progress Note Handoff: Pending  d/c arrangement to home with home care once IV abx. tx. arranged by case management.   Family discussion: medical plan of tx. was d/w pt. by bedside Disposition: Home__with home care possibly on monday

## 2020-03-23 VITALS
TEMPERATURE: 97 F | HEART RATE: 91 BPM | SYSTOLIC BLOOD PRESSURE: 173 MMHG | RESPIRATION RATE: 18 BRPM | DIASTOLIC BLOOD PRESSURE: 72 MMHG

## 2020-03-23 LAB
GLUCOSE BLDC GLUCOMTR-MCNC: 132 MG/DL — HIGH (ref 70–99)
GLUCOSE BLDC GLUCOMTR-MCNC: 264 MG/DL — HIGH (ref 70–99)
GLUCOSE BLDC GLUCOMTR-MCNC: 265 MG/DL — HIGH (ref 70–99)
SURGICAL PATHOLOGY STUDY: SIGNIFICANT CHANGE UP

## 2020-03-23 PROCEDURE — 99239 HOSP IP/OBS DSCHRG MGMT >30: CPT

## 2020-03-23 RX ADMIN — Medication 8 UNIT(S): at 17:25

## 2020-03-23 RX ADMIN — INSULIN GLARGINE 33 UNIT(S): 100 INJECTION, SOLUTION SUBCUTANEOUS at 08:12

## 2020-03-23 RX ADMIN — HEPARIN SODIUM 5000 UNIT(S): 5000 INJECTION INTRAVENOUS; SUBCUTANEOUS at 17:25

## 2020-03-23 RX ADMIN — Medication 81 MILLIGRAM(S): at 12:28

## 2020-03-23 RX ADMIN — ERTAPENEM SODIUM 120 MILLIGRAM(S): 1 INJECTION, POWDER, LYOPHILIZED, FOR SOLUTION INTRAMUSCULAR; INTRAVENOUS at 05:41

## 2020-03-23 RX ADMIN — HEPARIN SODIUM 5000 UNIT(S): 5000 INJECTION INTRAVENOUS; SUBCUTANEOUS at 05:41

## 2020-03-23 RX ADMIN — Medication 8 UNIT(S): at 08:11

## 2020-03-23 RX ADMIN — Medication 3: at 08:11

## 2020-03-23 RX ADMIN — LISINOPRIL 40 MILLIGRAM(S): 2.5 TABLET ORAL at 05:41

## 2020-03-23 RX ADMIN — Medication 3: at 12:27

## 2020-03-23 RX ADMIN — PANTOPRAZOLE SODIUM 40 MILLIGRAM(S): 20 TABLET, DELAYED RELEASE ORAL at 05:41

## 2020-03-23 RX ADMIN — Medication 8 UNIT(S): at 12:27

## 2020-03-23 RX ADMIN — Medication 60 MILLIGRAM(S): at 05:41

## 2020-03-23 RX ADMIN — CHLORHEXIDINE GLUCONATE 1 APPLICATION(S): 213 SOLUTION TOPICAL at 05:41

## 2020-03-23 NOTE — CHART NOTE - NSCHARTNOTEFT_GEN_A_CORE
<<<RESIDENT DISCHARGE NOTE>>>     JOSÉ MANUEL PORTER  MRN-[id number]    VITAL SIGNS:  T(F): 97 (03-23-20 @ 15:56), Max: 97.8 (03-23-20 @ 07:44)  HR: 91 (03-23-20 @ 15:56)  BP: 173/72 (03-23-20 @ 15:56)      PHYSICAL EXAMINATION:  GENERAL: NAD, well-developed, AAOx3  HEENT:  Atraumatic, Normocephalic. EOMI, PERRLA, conjunctiva and sclera clear, No JVD  PULMONARY: Clear to auscultation bilaterally; No wheeze  CARDIOVASCULAR: Regular rate and rhythm; No murmurs, rubs, or gallops  GASTROINTESTINAL: Soft, Nontender, Nondistended; Bowel sounds present  MUSCULOSKELETAL: right foot incision with sutures is well healing, no edema, no clubbing, no cyanosis, positive peripheral pulses b/l  NEUROLOGY: non-focal  SKIN: No rashes or lesions      FINAL DISCHARGE INTERVIEW:  Resident(s) Present: (Name:Dr. Matthews_____________), RN Present: (Name:  ___________)    DISCHARGE MEDICATION RECONCILIATION  reviewed with Attending (Name:___________)    DISPOSITION:   [  ] Home,    [x  ] Home with Visiting Nursing Services,   [    ]  SNF/ NH,    [   ] Acute Rehab (4A),   [   ] Other (Specify:_________)

## 2020-03-23 NOTE — PROGRESS NOTE ADULT - ATTENDING COMMENTS
Patient seen and examined independently. Agree with resident note.  # OM of 5th toe with 3rd toe showing chr inflammatory cells on ertapenem for 14 days--3/30  # DM continue insulin, trulicity and janumet  # HTN-- BP is a little high --continue benazepril at this time.    Dc plan to SNF for iv ABX-- spent more than 30mins. Patient seen and examined independently. Agree with resident note.  # OM of 5th toe with 3rd toe showing chr inflammatory cells on ertapenem for 14 days--3/30  # DM continue insulin, trulicity and janumet  # HTN-- BP is a little high --continue benazepril and nifedipine XL 60mg at this time.    Dc plan to SNF for iv ABX-- spent more than 30mins.

## 2020-03-23 NOTE — PROGRESS NOTE ADULT - SUBJECTIVE AND OBJECTIVE BOX
PROGRESS NOTE   Pt seen @ bedside during AM rounds. S/p 5th met head resection.  Dressing +Clean, +Dry, + Intact.       Vital Signs Last 24 Hrs  T(C): 36.6 (23 Mar 2020 07:44), Max: 36.6 (23 Mar 2020 07:44)  T(F): 97.8 (23 Mar 2020 07:44), Max: 97.8 (23 Mar 2020 07:44)  HR: 82 (23 Mar 2020 07:44) (69 - 82)  BP: 128/64 (23 Mar 2020 07:44) (110/53 - 129/61)  BP(mean): --  RR: 18 (23 Mar 2020 07:44) (18 - 18)  SpO2: --                        PHYSICAL EXAM  Derm:   Sutures Intact; Wound Edges Well Coapted; w/o signs of wound dehiscences. NO drainage.     Vascular: DP and PT Pulses Diminished; Foot is warm to warm to the touch   Neuro: Protective sensation diminished to the level of the digits right foot    Assessment:   Sutures Intact; Wound Edges are well coapted; No Signs of Wound Dehiscences   s/p 5th Metatarsal Resection Right Foot    Plan:   Chart reviewed and Patient evaluated. All Questions and concerns were addressed and answered   Discussed diagnosis and treatment with patient  Wound Care Orders; Flush w/ Normal Saline; Apply Betadinec / DSD / Kerlix  Continue w/ Local Wound Care; Dressing Changes Q24 / Betadine/DSD   Weight Bearing Status; NWB Right Foot; Can Apply Pressure to Heel to Transition w/ Surgical Shoe  Patient Is Stable Per Podiatry Standpoint; Can Follow Up as Outpatient; two Weeks Post Discharge  Follow Up w/ ID; Discharge ABx Regiment  Discussed Plan w/ Dr. Bhatia     Spectra;

## 2020-03-23 NOTE — PROGRESS NOTE ADULT - REASON FOR ADMISSION
Diabetic foot ulcer

## 2020-03-23 NOTE — PROGRESS NOTE ADULT - SUBJECTIVE AND OBJECTIVE BOX
JOSÉ MANUEL PORTER 70y Male  MRN#: [id number]   CODE STATUS: FULL      SUBJECTIVE  Patient is a 70y old Male who presents with a chief complaint of Diabetic foot ulcer (23 Mar 2020 08:40)    Currently admitted to medicine with the primary diagnosis of Osteomyelitis of 5th right metatarsal      Today is hospital day 11d,   OVERNIGHT EVENTS: none, waiting for insurance authorization , podiatry changed dressing today.   This morning he is laying in bed comfortably .   Denies chest pain, shortness of breath, abdoimal pain, nausea, vomiting or changes in bowel habits.  He  has no complaints today.       Present Today:           Basilio Catheter (x)No/ ()Yes?   Indication:             Central Line (x)No/ ()Yes?   Indication:          IV Fluids (x)No/ ()Yes? Type:  Rate:  Indication:    OBJECTIVE  PAST MEDICAL & SURGICAL HISTORY  GERD (gastroesophageal reflux disease)  Depression  Diabetic foot ulcer  Dyslipidemia  HTN (hypertension)  DM (diabetes mellitus): 12/27/18 hgb aic  11.2  Toe amputation status, right: (2008)    ALLERGIES:  No Known Allergies    HOME MEDICATIONS:  Home Medications:  benazepril 40 mg oral tablet: 1 tab(s) orally once a day (12 Mar 2020 19:24)  Crestor 10 mg oral tablet: 1 tab(s) orally once a day (at bedtime) (12 Mar 2020 19:24)  Janumet 50 mg-1000 mg oral tablet: 1 tab(s) orally 2 times a day (12 Mar 2020 19:24)  Lantus 100 units/mL subcutaneous solution: 30 unit(s) subcutaneous once a day in the morning (12 Mar 2020 19:24)  omeprazole 20 mg oral delayed release capsule: 1 cap(s) orally once a day (12 Mar 2020 19:24)  Trulicity Pen 0.75 mg/0.5 mL subcutaneous solution:  (12 Mar 2020 19:24)    MEDICATIONS:  STANDING MEDICATIONS  aspirin enteric coated 81 milliGRAM(s) Oral daily  atorvastatin 40 milliGRAM(s) Oral at bedtime  chlorhexidine 4% Liquid 1 Application(s) Topical <User Schedule>  dextrose 5%. 1000 milliLiter(s) (50 mL/Hr) IV Continuous <Continuous>  dextrose 50% Injectable 12.5 Gram(s) IV Push once  dextrose 50% Injectable 25 Gram(s) IV Push once  dextrose 50% Injectable 25 Gram(s) IV Push once  ertapenem  IVPB 1000 milliGRAM(s) IV Intermittent every 24 hours  heparin  Injectable 5000 Unit(s) SubCutaneous every 12 hours  insulin glargine Injectable (LANTUS) 33 Unit(s) SubCutaneous every morning  insulin lispro (HumaLOG) corrective regimen sliding scale   SubCutaneous three times a day before meals  insulin lispro Injectable (HumaLOG) 8 Unit(s) SubCutaneous three times a day before meals  lisinopril 40 milliGRAM(s) Oral daily  NIFEdipine XL 60 milliGRAM(s) Oral daily  pantoprazole    Tablet 40 milliGRAM(s) Oral before breakfast    PRN MEDICATIONS  dextrose 40% Gel 15 Gram(s) Oral once PRN  glucagon  Injectable 1 milliGRAM(s) IntraMuscular once PRN      VITAL SIGNS: Last 24 Hours  T(C): 36.6 (23 Mar 2020 07:44), Max: 36.6 (23 Mar 2020 07:44)  T(F): 97.8 (23 Mar 2020 07:44), Max: 97.8 (23 Mar 2020 07:44)  HR: 82 (23 Mar 2020 07:44) (69 - 82)  BP: 128/64 (23 Mar 2020 07:44) (110/53 - 128/64)  RR: 18 (23 Mar 2020 07:44) (18 - 18)    LABS:    RADIOLOGY:          ECHO:      PHYSICAL EXAM:    GENERAL: NAD, well-developed, AAOx3  HEENT:  Atraumatic, Normocephalic. EOMI, PERRLA, conjunctiva and sclera clear, No JVD  PULMONARY: Clear to auscultation bilaterally; No wheeze  CARDIOVASCULAR: Regular rate and rhythm; No murmurs, rubs, or gallops  GASTROINTESTINAL: Soft, Nontender, Nondistended; Bowel sounds present  MUSCULOSKELETAL: right foot incision with sutures is well healing, no edema, no clubbing, no cyanosis, positive peripheral pulses b/l  NEUROLOGY: non-focal  SKIN: No rashes or lesions    ASSESSMENT & PLAN  69 year-old male with PMH of insulin-dependent DM c/b diabetic foot ulcers s/p amputations of half of left great toe and entire right great toe, PVD, HTN, HLD, GERD, depression, recently amputated right 3rd toe (Dec 4th) right 4th toe (Dec 11) s/p debridement on Dec 11th presented to the Ed for right foot discharge since yesterday.     # Osteomyelitis of 5th right metatarsal; possible osteomyelitis of 3rd right metatarsal, so systemic symptoms. s/p 5th Metatarsal Resection Right Foot    s/p Pulsed irrigation of wound 16-Mar-2020 Biopsy, bone, toe, open 16-Mar-2020 13:06:54 Resection, bone, metatarsal     3/16 OR swab  Numerous Streptococcus agalactiae (Group B)     Per Pod 5th met healthy bone, 3rd met soft bone concerning for possible OM-- pending biopsy, soft bone     Hx enterobacter & stenotrophomonas 12/2019   Sedimentation Rate, Erythrocyte: 54 mm/Hr (03.13.20 @ 07:36) C-Reactive Protein, Serum: 7.31 mg/dL (03.13.20 @ 07:36)    BCx 3/13 NG  -no evidence of sepsis on admission  -XR R foot: OM w 5th metatarsal; questionable 3rd metatarsal involvement  - Arterial Duplex R: minimal stenosis of tibial artery  - Venous Duplex BL: no DVT  - Vascular on board: no intervention  - blood cultures - negative   - esr 54/ crp 7.31  -s/p OR for debridement and wound washout on 3/16  -ID eval: will withold antibiotics until bone cultures taken, started on meropenem on 3/17/20  Plan:  - or cultures grew group B agalactiae   plan for midline x ertapenem 1g q24h IV (given hx GNRs) x 14 days from OR and if path of 3rd Met is + will prolong another 14 days then possible levaquin PO.   - Weekly CBC, BMP  - ID follow-up with Tues Dr. Gary Turpin 3/24 12:30 PM       [id number] [facility]       [phone] (call to make an appointment, walk-ins Tuesdays 10:30 AM)      Fax [phone]    - f/u bone cultures and adjust abx accordingly  - podiatry:  Wound Care Orders; Flush w/ Normal Saline; Apply Betadine Soaked Adaptic / DSD / Kerlix  Continue w/ Local Wound Care; Dressing Changes Q24 / Betadine Adaptic   Weight Bearing Status; NWB Right Foot; Can Apply Pressure to Heel to Transition w/ Surgical Shoe  - PT- walked 250ft, rec: home with assistance  - scripts given for IV Abx and wound care dressing    # Insulin-dependent T2DM  -Takes Humalin 30 units at breakfast, Trulicity 0.5 weekly, and Janumet  BID outpatient  -cw basal bolus insulin at this time  -Monitor fingersticks with meals and at bedtime    # Hypertension  -Takes Benazepril 40 mg qD at home  -C/w Lisinopril 40 mg qD for now  -Monitor BP    # Hyperlipidemia  -Takes Crestor 10 mg qHS at home  -C/w Atorvastatin 40 mg qHS for now    # GERD  -cw PPI    # Depression  -not on any med  -fu outpt psych    DVT ppx: Heparin   GI ppx: Protonix  Diet: DASH/TLC consistent carb  Activity: OOBTC  Code status: FULL CODE  Dispo: From home,   Pending  d/c arrangement to home with home care once IV abx. tx. arranged by case management.

## 2020-03-25 DIAGNOSIS — K21.9 GASTRO-ESOPHAGEAL REFLUX DISEASE WITHOUT ESOPHAGITIS: ICD-10-CM

## 2020-03-25 DIAGNOSIS — E11.51 TYPE 2 DIABETES MELLITUS WITH DIABETIC PERIPHERAL ANGIOPATHY WITHOUT GANGRENE: ICD-10-CM

## 2020-03-25 DIAGNOSIS — Z89.421 ACQUIRED ABSENCE OF OTHER RIGHT TOE(S): ICD-10-CM

## 2020-03-25 DIAGNOSIS — Z79.4 LONG TERM (CURRENT) USE OF INSULIN: ICD-10-CM

## 2020-03-25 DIAGNOSIS — E11.69 TYPE 2 DIABETES MELLITUS WITH OTHER SPECIFIED COMPLICATION: ICD-10-CM

## 2020-03-25 DIAGNOSIS — L97.519 NON-PRESSURE CHRONIC ULCER OF OTHER PART OF RIGHT FOOT WITH UNSPECIFIED SEVERITY: ICD-10-CM

## 2020-03-25 DIAGNOSIS — Z89.411 ACQUIRED ABSENCE OF RIGHT GREAT TOE: ICD-10-CM

## 2020-03-25 DIAGNOSIS — E11.621 TYPE 2 DIABETES MELLITUS WITH FOOT ULCER: ICD-10-CM

## 2020-03-25 DIAGNOSIS — M86.171 OTHER ACUTE OSTEOMYELITIS, RIGHT ANKLE AND FOOT: ICD-10-CM

## 2020-03-25 DIAGNOSIS — I10 ESSENTIAL (PRIMARY) HYPERTENSION: ICD-10-CM

## 2020-03-25 DIAGNOSIS — F32.9 MAJOR DEPRESSIVE DISORDER, SINGLE EPISODE, UNSPECIFIED: ICD-10-CM

## 2020-03-25 DIAGNOSIS — E78.5 HYPERLIPIDEMIA, UNSPECIFIED: ICD-10-CM

## 2020-04-08 ENCOUNTER — APPOINTMENT (OUTPATIENT)
Dept: PODIATRY | Facility: CLINIC | Age: 70
End: 2020-04-08

## 2020-04-09 DIAGNOSIS — Z71.89 OTHER SPECIFIED COUNSELING: ICD-10-CM

## 2020-04-20 ENCOUNTER — APPOINTMENT (OUTPATIENT)
Dept: PODIATRY | Facility: CLINIC | Age: 70
End: 2020-04-20

## 2020-04-27 ENCOUNTER — OUTPATIENT (OUTPATIENT)
Dept: OUTPATIENT SERVICES | Facility: HOSPITAL | Age: 70
LOS: 1 days | Discharge: HOME | End: 2020-04-27

## 2020-04-27 ENCOUNTER — APPOINTMENT (OUTPATIENT)
Dept: PODIATRY | Facility: CLINIC | Age: 70
End: 2020-04-27
Payer: MEDICARE

## 2020-04-27 DIAGNOSIS — Z89.421 ACQUIRED ABSENCE OF OTHER RIGHT TOE(S): Chronic | ICD-10-CM

## 2020-04-27 PROCEDURE — 99024 POSTOP FOLLOW-UP VISIT: CPT

## 2020-04-27 NOTE — HISTORY OF PRESENT ILLNESS
[FreeTextEntry1] : 69 y/o male s/p right 3rd ray resection 12/4 and 2nd, 4th and 5th ray resection 12/11. patient subsequently developed dehiscence and underwent revision of dehisced site on 12/17. Pt currently at home and has VNS come to home for dressings changes daily. Patient has been weightbearing as tolerated. States his blood sugar was 160 yesterday and was around 125 today.

## 2020-04-27 NOTE — END OF VISIT
[] : Resident [FreeTextEntry3] : improvement in drainage\par wound healing well\par wash w/ soap and water + apply paulo

## 2020-04-27 NOTE — PHYSICAL EXAM
[General Appearance - Alert] : alert [General Appearance - In No Acute Distress] : in no acute distress [Oriented To Time, Place, And Person] : oriented to person, place, and time [Impaired Insight] : insight and judgment were intact [FreeTextEntry1] : right foot: dehiscence at the lateral aspect of TMA with some healing, suture intact, mild macerated wound edges.

## 2020-04-27 NOTE — ASSESSMENT
[FreeTextEntry1] : planter skin appears coapted. There is  some dehiscence at the lateral  aspects of the TMA site. no signs of infection.\par \par -Pt. seen and evaluated\par -Discussed treatment and condition w/ patient\par -finished picc line\par -suture removed and wound debrided using dermal curette and #15 blade down to subcutaneous, granular wound base measures about 3cm x 1 cm x 0.2 cm. applied primsa moist to dry kerlix ace\par -two darco shoes given to patient\par -new wound care prescription given to patient\par -pt to f/u in 3 weeks\par

## 2020-05-18 ENCOUNTER — APPOINTMENT (OUTPATIENT)
Dept: PODIATRY | Facility: CLINIC | Age: 70
End: 2020-05-18
Payer: MEDICARE

## 2020-05-18 ENCOUNTER — OUTPATIENT (OUTPATIENT)
Dept: OUTPATIENT SERVICES | Facility: HOSPITAL | Age: 70
LOS: 1 days | Discharge: HOME | End: 2020-05-18

## 2020-05-18 VITALS
WEIGHT: 204 LBS | BODY MASS INDEX: 28.56 KG/M2 | TEMPERATURE: 97.8 F | HEIGHT: 71 IN | DIASTOLIC BLOOD PRESSURE: 77 MMHG | SYSTOLIC BLOOD PRESSURE: 124 MMHG | HEART RATE: 87 BPM

## 2020-05-18 DIAGNOSIS — Z89.421 ACQUIRED ABSENCE OF OTHER RIGHT TOE(S): Chronic | ICD-10-CM

## 2020-05-18 DIAGNOSIS — T14.8XXD OTHER INJURY OF UNSPECIFIED BODY REGION, SUBSEQUENT ENCOUNTER: ICD-10-CM

## 2020-05-18 PROCEDURE — 99213 OFFICE O/P EST LOW 20 MIN: CPT | Mod: 25

## 2020-05-18 PROCEDURE — 11042 DBRDMT SUBQ TIS 1ST 20SQCM/<: CPT | Mod: 58

## 2020-05-18 NOTE — END OF VISIT
[>50% of the face to face encounter time was spent on counseling and/or coordination of care for ___] : Greater than 50% of the face to face encounter time was spent on counseling and/or coordination of care for [unfilled] [Time Spent: ___ minutes] : I have spent [unfilled] minutes of time on the encounter. [FreeTextEntry3] : d/c Vashti dressing for\par Rx santyl-->instructed to apply daily\par pt to follow up with Hbot therapy. D/w Dr. Todd @ HBOT center \par return one week [FreeTextEntry2] : excisional debridement of wound with #15 blade down to subcutaneous tissue

## 2020-05-18 NOTE — ASSESSMENT
[FreeTextEntry1] : planter skin appears coapted. There is  some dehiscence at the lateral  aspects of the TMA site. no signs of infection.\par \par -Pt. seen and evaluated\par -Discussed treatment and condition w/ patient\par -finished picc line\par -callus debrided using dermal curette and #15 blade down to subcutaneous, granular wound base measures about 3cm x 0.5 cm x 0.2 cm, mild serous fluid noted; wound culture was taken right foot, applied medi honey dsd kerlix ace\par local wound care: wash with soap and water, apply santyl moist to dry dsd kerlix \par -cont darco \par -new wound care prescription given to patient\par -f/u wound culture right foot\par -santyl\par -pt to f/u in 1 week\par

## 2020-05-18 NOTE — PHYSICAL EXAM
[General Appearance - Alert] : alert [General Appearance - In No Acute Distress] : in no acute distress [Oriented To Time, Place, And Person] : oriented to person, place, and time [Impaired Insight] : insight and judgment were intact [FreeTextEntry1] : lateral aspect of the right TMA stump, persistant wound w/ fibrotic base w/ overlying callus formation. mixed serous -purulant drainage. non-odorous. no surrounding erythema. no increase in pedal temperature

## 2020-05-18 NOTE — HISTORY OF PRESENT ILLNESS
[FreeTextEntry1] : 71 y/o male s/p right 3rd ray resection 12/4 and 2nd, 4th and 5th ray resection 12/11. patient subsequently developed dehiscence and underwent revision of dehisced site on 12/17. Pt currently at home and has VNS come to home for dressings changes daily. Patient has been weightbearing as tolerated. States his blood sugar was 125 today.

## 2020-05-20 ENCOUNTER — APPOINTMENT (OUTPATIENT)
Dept: ENDOCRINOLOGY | Facility: CLINIC | Age: 70
End: 2020-05-20

## 2020-05-20 ENCOUNTER — OUTPATIENT (OUTPATIENT)
Dept: OUTPATIENT SERVICES | Facility: HOSPITAL | Age: 70
LOS: 1 days | Discharge: HOME | End: 2020-05-20

## 2020-05-20 VITALS
TEMPERATURE: 97.6 F | BODY MASS INDEX: 30.24 KG/M2 | WEIGHT: 216 LBS | HEIGHT: 71 IN | DIASTOLIC BLOOD PRESSURE: 81 MMHG | HEART RATE: 70 BPM | SYSTOLIC BLOOD PRESSURE: 138 MMHG

## 2020-05-20 DIAGNOSIS — Z89.421 ACQUIRED ABSENCE OF OTHER RIGHT TOE(S): Chronic | ICD-10-CM

## 2020-05-20 NOTE — PHYSICAL EXAM
[Alert] : alert [Well Nourished] : well nourished [No Acute Distress] : no acute distress [Well Developed] : well developed [Normal Sclera/Conjunctiva] : normal sclera/conjunctiva [EOMI] : extra ocular movement intact [No Proptosis] : no proptosis [Normal Oropharynx] : the oropharynx was normal [Thyroid Not Enlarged] : the thyroid was not enlarged [No Thyroid Nodules] : no palpable thyroid nodules [No Respiratory Distress] : no respiratory distress [No Accessory Muscle Use] : no accessory muscle use [Clear to Auscultation] : lungs were clear to auscultation bilaterally [Normal S1, S2] : normal S1 and S2 [Normal Rate] : heart rate was normal [Regular Rhythm] : with a regular rhythm [No Edema] : no peripheral edema [Pedal Pulses Normal] : the pedal pulses are present [Normal Bowel Sounds] : normal bowel sounds [Not Tender] : non-tender [Not Distended] : not distended [Soft] : abdomen soft [Normal Anterior Cervical Nodes] : no anterior cervical lymphadenopathy [Normal Posterior Cervical Nodes] : no posterior cervical lymphadenopathy [Spine Straight] : spine straight [No Spinal Tenderness] : no spinal tenderness [No Stigmata of Cushings Syndrome] : no stigmata of Cushings Syndrome [Normal Gait] : normal gait [No Rash] : no rash [Normal Strength/Tone] : muscle strength and tone were normal [Acanthosis Nigricans] : no acanthosis nigricans [Normal Reflexes] : deep tendon reflexes were 2+ and symmetric [No Tremors] : no tremors [Oriented x3] : oriented to person, place, and time

## 2020-05-20 NOTE — REASON FOR VISIT
[Follow - Up] : a follow-up visit [DM Type 2] : DM Type 2 [Weight Management/Obesity] : weight management/obesity

## 2020-05-20 NOTE — HISTORY OF PRESENT ILLNESS
[FreeTextEntry1] : non compliant not seen for more than 1 year, stopped all medications dr saavedra prescribed.

## 2020-05-20 NOTE — ASSESSMENT
[FreeTextEntry1] : no home blood glucose monitoring, needs meter, and test strips, add pioglitazone, add trulicity 1.5 mg. weekly, continue 70/30 insulin for now. badly needs diabetes education. does not ever follow up. poor prognosis. [Diabetes Foot Care] : diabetes foot care [Long Term Vascular Complications] : long term vascular complications of diabetes [Carbohydrate Consistent Diet] : carbohydrate consistent diet [Importance of Diet and Exercise] : importance of diet and exercise to improve glycemic control, achieve weight loss and improve cardiovascular health [Hypoglycemia Management] : hypoglycemia management [Action and use of Insulin] : action and use of short and long-acting insulin [Retinopathy Screening] : Patient was referred to ophthalmology for retinopathy screening

## 2020-05-20 NOTE — DATA REVIEWED
[No studies available for review at this time.] : No studies available for review at this time. [FreeTextEntry1] : very high glucoses.

## 2020-05-22 RX ORDER — LANCETS
EACH MISCELLANEOUS
Qty: 100 | Refills: 1 | Status: DISCONTINUED | COMMUNITY
Start: 2019-01-17 | End: 2020-05-22

## 2020-05-22 RX ORDER — LANCING DEVICE/LANCETS
KIT MISCELLANEOUS
Qty: 1 | Refills: 0 | Status: DISCONTINUED | COMMUNITY
Start: 2019-01-17 | End: 2020-05-22

## 2020-05-22 RX ORDER — BLOOD SUGAR DIAGNOSTIC
STRIP MISCELLANEOUS 3 TIMES DAILY
Qty: 1 | Refills: 5 | Status: DISCONTINUED | COMMUNITY
Start: 2019-01-17 | End: 2020-05-22

## 2020-05-26 DIAGNOSIS — E11.65 TYPE 2 DIABETES MELLITUS WITH HYPERGLYCEMIA: ICD-10-CM

## 2020-05-27 ENCOUNTER — OUTPATIENT (OUTPATIENT)
Dept: OUTPATIENT SERVICES | Facility: HOSPITAL | Age: 70
LOS: 1 days | Discharge: HOME | End: 2020-05-27

## 2020-05-27 ENCOUNTER — APPOINTMENT (OUTPATIENT)
Dept: PODIATRY | Facility: CLINIC | Age: 70
End: 2020-05-27
Payer: MEDICARE

## 2020-05-27 DIAGNOSIS — Z89.421 ACQUIRED ABSENCE OF OTHER RIGHT TOE(S): Chronic | ICD-10-CM

## 2020-05-27 PROCEDURE — 11042 DBRDMT SUBQ TIS 1ST 20SQCM/<: CPT | Mod: 58

## 2020-05-28 NOTE — END OF VISIT
[Resident] : Resident [FreeTextEntry2] : excisional debridement performed with curette and #15 blade down to subcutaneous, right TMA stump\par pt never picked up antibiotics that were sent\par resent p.o antibiotics\par will add fluconazole, based on culture results \par continue with HBOT\par return one week

## 2020-05-28 NOTE — HISTORY OF PRESENT ILLNESS
[FreeTextEntry1] : 71 y/o male s/p right 3rd ray resection 12/4 and 2nd, 4th and 5th ray resection 12/11. patient subsequently developed dehiscence and underwent revision of dehisced site on 12/17. Pt currently at home and has VNS come to home for dressings changes daily. Patient has been weightbearing as tolerated.

## 2020-05-28 NOTE — PHYSICAL EXAM
[General Appearance - Alert] : alert [General Appearance - In No Acute Distress] : in no acute distress [Oriented To Time, Place, And Person] : oriented to person, place, and time [Impaired Insight] : insight and judgment were intact [FreeTextEntry1] : lateral aspect of the right TMA stump, persistent wound w/ fibrotic base w/ overlying callus formation. mixed serous -purulant drainage. non-odorous. no surrounding erythema. no increase in pedal temperature

## 2020-05-28 NOTE — ASSESSMENT
[FreeTextEntry1] : planter skin appears coapted. There is  some dehiscence at the lateral  aspects of the TMA site. no signs of infection.\par \par -Pt. seen and evaluated\par -Discussed treatment and condition w/ patient\par -finished picc line\par -dermal curette and #15 blade down to subcutaneous, granular wound base measures about 3cm x 0.5 cm x 0.2 cm, mild serous fluid noted; wound culture was taken right foot, applied medi honey dsd kerlix ace\par local wound care: wash with soap and water, apply santyl moist to dry dsd kerlix \par -cont darco \par -cont LWC\par -f/u wound culture right foot: rare coag - staphy, mod candida, rx cefadroxil 500mg q12h x7 days\par -santyl\par -pt to f/u in 1 week\par

## 2020-06-01 LAB — BACTERIA WND CULT: ABNORMAL

## 2020-06-03 ENCOUNTER — APPOINTMENT (OUTPATIENT)
Dept: PODIATRY | Facility: CLINIC | Age: 70
End: 2020-06-03

## 2020-06-04 ENCOUNTER — OUTPATIENT (OUTPATIENT)
Dept: OUTPATIENT SERVICES | Facility: HOSPITAL | Age: 70
LOS: 1 days | Discharge: HOME | End: 2020-06-04

## 2020-06-04 ENCOUNTER — APPOINTMENT (OUTPATIENT)
Dept: PODIATRY | Facility: CLINIC | Age: 70
End: 2020-06-04
Payer: MEDICARE

## 2020-06-04 DIAGNOSIS — Z89.421 ACQUIRED ABSENCE OF OTHER RIGHT TOE(S): Chronic | ICD-10-CM

## 2020-06-04 PROCEDURE — 11042 DBRDMT SUBQ TIS 1ST 20SQCM/<: CPT | Mod: 58

## 2020-06-06 NOTE — ASSESSMENT
[FreeTextEntry1] : planter skin appears coapted. There is  some dehiscence at the lateral  aspects of the TMA site. no signs of infection.\par \par -Pt. seen and evaluated\par -Discussed treatment and condition w/ patient\par -finished picc line\par -exisional debridement right foot #15 blade down to subcutaneous, granular wound base measures about 2cm x 0.5 cm x 0.2 cm, mild serous fluid noted; wound culture was taken right foot, applied medi honey dsd kerlix ace\par local wound care: wash with soap and water, apply santyl moist to dry dsd kerlix \par -cont darco \par -cont LWC\par -pt to f/u in 1 week\par

## 2020-06-06 NOTE — HISTORY OF PRESENT ILLNESS
[FreeTextEntry1] : 69 y/o male s/p right 3rd ray resection 12/4 and 2nd, 4th and 5th ray resection 12/11. patient subsequently developed dehiscence and underwent revision of dehisced site on 12/17. Pt currently at home and has VNS come to home for dressings changes daily. Patient has been weightbearing as tolerated.

## 2020-06-11 ENCOUNTER — APPOINTMENT (OUTPATIENT)
Dept: PODIATRY | Facility: CLINIC | Age: 70
End: 2020-06-11
Payer: MEDICARE

## 2020-06-11 ENCOUNTER — OUTPATIENT (OUTPATIENT)
Dept: OUTPATIENT SERVICES | Facility: HOSPITAL | Age: 70
LOS: 1 days | Discharge: HOME | End: 2020-06-11

## 2020-06-11 DIAGNOSIS — Z89.421 ACQUIRED ABSENCE OF OTHER RIGHT TOE(S): Chronic | ICD-10-CM

## 2020-06-11 PROCEDURE — 12002 RPR S/N/AX/GEN/TRNK2.6-7.5CM: CPT | Mod: 58

## 2020-06-12 NOTE — HISTORY OF PRESENT ILLNESS
[FreeTextEntry1] : s/p right 3rd ray resection 12/4 and 2nd, 4th and 5th ray resection 12/11. \par patient subsequently developed dehiscence and underwent revision of dehisced site on 12/17. \par Pt currently at home and has VNS come to home for dressings changes daily and HBO therapy.\par Patient has been weightbearing as tolerated.

## 2020-06-12 NOTE — END OF VISIT
[Resident] : Resident [FreeTextEntry2] : excisional debridement of wound base performed down to subcutaneous bleeding tissue. skin edges refreshed. simple closure of wound dehiscence performed\par continue with betadine dressing and HBOT

## 2020-06-12 NOTE — ASSESSMENT
[FreeTextEntry1] : planter skin appears coapted. There is  some dehiscence at the lateral  aspects of the TMA site. no signs of infection.\par \par -Pt. seen and evaluated\par -Discussed treatment and condition w/ patient\par -Dbx of the surgical site down to the subcutaneous tissue measuring 2.8cm by 0.6 x 0.5cm . primary closure with 3.0 nylon.  \par local wound care: Betadine/DSD \par -cont darco \par -cont LWC\par -pt to f/u in 1 week\par

## 2020-06-12 NOTE — PHYSICAL EXAM
[General Appearance - Alert] : alert [General Appearance - In No Acute Distress] : in no acute distress [Oriented To Time, Place, And Person] : oriented to person, place, and time [Impaired Insight] : insight and judgment were intact [FreeTextEntry1] : lateral aspect of the right TMA stump, persistent wound w/ fibrotic base w/ overlying callus formation. No drainage. non-odorous. no surrounding erythema. no increase in pedal temperature

## 2020-06-17 ENCOUNTER — APPOINTMENT (OUTPATIENT)
Dept: ENDOCRINOLOGY | Facility: CLINIC | Age: 70
End: 2020-06-17

## 2020-06-17 ENCOUNTER — APPOINTMENT (OUTPATIENT)
Dept: PODIATRY | Facility: CLINIC | Age: 70
End: 2020-06-17

## 2020-06-17 DIAGNOSIS — L97.529 NON-PRESSURE CHRONIC ULCER OF OTHER PART OF LEFT FOOT WITH UNSPECIFIED SEVERITY: ICD-10-CM

## 2020-06-17 DIAGNOSIS — E11.9 TYPE 2 DIABETES MELLITUS WITHOUT COMPLICATIONS: ICD-10-CM

## 2020-06-17 DIAGNOSIS — L02.612 CUTANEOUS ABSCESS OF LEFT FOOT: ICD-10-CM

## 2020-06-18 ENCOUNTER — OUTPATIENT (OUTPATIENT)
Dept: OUTPATIENT SERVICES | Facility: HOSPITAL | Age: 70
LOS: 1 days | Discharge: HOME | End: 2020-06-18

## 2020-06-18 ENCOUNTER — APPOINTMENT (OUTPATIENT)
Dept: PODIATRY | Facility: CLINIC | Age: 70
End: 2020-06-18
Payer: MEDICARE

## 2020-06-18 ENCOUNTER — APPOINTMENT (OUTPATIENT)
Dept: ENDOCRINOLOGY | Facility: CLINIC | Age: 70
End: 2020-06-18
Payer: MEDICARE

## 2020-06-18 DIAGNOSIS — Z89.421 ACQUIRED ABSENCE OF OTHER RIGHT TOE(S): Chronic | ICD-10-CM

## 2020-06-18 PROCEDURE — 11042 DBRDMT SUBQ TIS 1ST 20SQCM/<: CPT | Mod: RT

## 2020-06-18 NOTE — PHYSICAL EXAM
[Alert] : alert [Well Nourished] : well nourished [Normal Sclera/Conjunctiva] : normal sclera/conjunctiva [No Acute Distress] : no acute distress [Well Developed] : well developed [EOMI] : extra ocular movement intact [No Proptosis] : no proptosis [Normal Oropharynx] : the oropharynx was normal [Thyroid Not Enlarged] : the thyroid was not enlarged [No Accessory Muscle Use] : no accessory muscle use [No Thyroid Nodules] : no palpable thyroid nodules [No Respiratory Distress] : no respiratory distress [Normal S1, S2] : normal S1 and S2 [Normal Rate] : heart rate was normal [Clear to Auscultation] : lungs were clear to auscultation bilaterally [Regular Rhythm] : with a regular rhythm [No Edema] : no peripheral edema [Normal Bowel Sounds] : normal bowel sounds [Not Tender] : non-tender [Pedal Pulses Normal] : the pedal pulses are present [Not Distended] : not distended [Normal Anterior Cervical Nodes] : no anterior cervical lymphadenopathy [Soft] : abdomen soft [Spine Straight] : spine straight [Normal Posterior Cervical Nodes] : no posterior cervical lymphadenopathy [No Spinal Tenderness] : no spinal tenderness [No Stigmata of Cushings Syndrome] : no stigmata of Cushings Syndrome [Normal Strength/Tone] : muscle strength and tone were normal [No Rash] : no rash [Normal Gait] : normal gait [Acanthosis Nigricans] : no acanthosis nigricans [Normal Reflexes] : deep tendon reflexes were 2+ and symmetric [No Tremors] : no tremors [Oriented x3] : oriented to person, place, and time

## 2020-06-18 NOTE — ASSESSMENT
[FreeTextEntry1] : STOP 70/30, TRY tresiba u-200, add jardiance in AM, continue all other meds. [Diabetes Foot Care] : diabetes foot care [Long Term Vascular Complications] : long term vascular complications of diabetes [Carbohydrate Consistent Diet] : carbohydrate consistent diet [Importance of Diet and Exercise] : importance of diet and exercise to improve glycemic control, achieve weight loss and improve cardiovascular health

## 2020-06-18 NOTE — REASON FOR VISIT
[Weight Management/Obesity] : weight management/obesity [Follow - Up] : a follow-up visit [DM Type 2] : DM Type 2

## 2020-06-22 DIAGNOSIS — E78.01 FAMILIAL HYPERCHOLESTEROLEMIA: ICD-10-CM

## 2020-06-22 DIAGNOSIS — E11.65 TYPE 2 DIABETES MELLITUS WITH HYPERGLYCEMIA: ICD-10-CM

## 2020-06-22 NOTE — PHYSICAL EXAM
[General Appearance - Alert] : alert [General Appearance - In No Acute Distress] : in no acute distress [Impaired Insight] : insight and judgment were intact [Oriented To Time, Place, And Person] : oriented to person, place, and time [FreeTextEntry1] : lateral aspect of the right TMA stump, persistent wound w/ fibrotic base w/ overlying callus formation. No drainage. non-odorous. no surrounding erythema. no increase in pedal temperature

## 2020-06-22 NOTE — ASSESSMENT
[FreeTextEntry1] : There is  some dehiscence at the lateral  aspects of the TMA site with macerated skin edges. suture removed today.\par \par -Pt. seen and evaluated\par -Discussed treatment and condition w/ patient\par -Dbx of the surgical site down to the subcutaneous tissue measuring 2.5cm by 1cm x 0.5cm .\par cont HBOT\par local wound care: mehihoney dsd abd kerlix \par rx medihoney\par -cont darco \par -cont LWC\par -pt to f/u in 2 weeks\par

## 2020-06-22 NOTE — END OF VISIT
[Resident] : Resident [FreeTextEntry2] : on last visit simple reapproximation of ulcer edges was performed.  on todays visit there was noted surrounding maceration around the skin edges. \par Sutures were removed and ulcer was excisionally debrided down to subcutaneous tissue w/ 15 blade. Continue with HBOT and application of medihoney. return 2 weeks

## 2020-06-29 DIAGNOSIS — T14.8XXD OTHER INJURY OF UNSPECIFIED BODY REGION, SUBSEQUENT ENCOUNTER: ICD-10-CM

## 2020-07-02 ENCOUNTER — APPOINTMENT (OUTPATIENT)
Dept: PODIATRY | Facility: CLINIC | Age: 70
End: 2020-07-02
Payer: MEDICARE

## 2020-07-02 ENCOUNTER — OUTPATIENT (OUTPATIENT)
Dept: OUTPATIENT SERVICES | Facility: HOSPITAL | Age: 70
LOS: 1 days | Discharge: HOME | End: 2020-07-02

## 2020-07-02 DIAGNOSIS — Z89.421 ACQUIRED ABSENCE OF OTHER RIGHT TOE(S): Chronic | ICD-10-CM

## 2020-07-02 PROCEDURE — 11042 DBRDMT SUBQ TIS 1ST 20SQCM/<: CPT

## 2020-07-04 NOTE — ASSESSMENT
[FreeTextEntry1] : There is  some dehiscence at the lateral  aspects of the TMA site with macerated skin edges. \par \par -Pt. seen and evaluated\par -Discussed treatment and condition w/ patient\par -Dbx of the surgical site down to the subcutaneous tissue measuring 2.5cm by 1cm x 0.5cm .\par cont HBOT\par local wound care: elihoney dsd abd kerlix \par rx medihoney\par -cont darco \par -cont LWC\par -pt to f/u in 2 weeks\par

## 2020-07-04 NOTE — PHYSICAL EXAM
[Oriented To Time, Place, And Person] : oriented to person, place, and time [General Appearance - Alert] : alert [General Appearance - In No Acute Distress] : in no acute distress [Impaired Insight] : insight and judgment were intact [FreeTextEntry1] : lateral aspect of the right TMA stump, persistent wound w/ granular base w/ overlying callus formation. No drainage. non-odorous. no surrounding erythema. no increase in pedal temperature, 2cm x 0.8cm

## 2020-07-16 ENCOUNTER — APPOINTMENT (OUTPATIENT)
Dept: PODIATRY | Facility: CLINIC | Age: 70
End: 2020-07-16
Payer: MEDICARE

## 2020-07-16 ENCOUNTER — OUTPATIENT (OUTPATIENT)
Dept: OUTPATIENT SERVICES | Facility: HOSPITAL | Age: 70
LOS: 1 days | Discharge: HOME | End: 2020-07-16

## 2020-07-16 DIAGNOSIS — T14.8XXD OTHER INJURY OF UNSPECIFIED BODY REGION, SUBSEQUENT ENCOUNTER: ICD-10-CM

## 2020-07-16 DIAGNOSIS — T81.31XA DISRUPTION OF EXTERNAL OPERATION (SURGICAL) WOUND, NOT ELSEWHERE CLASSIFIED, INITIAL ENCOUNTER: ICD-10-CM

## 2020-07-16 DIAGNOSIS — Z89.421 ACQUIRED ABSENCE OF OTHER RIGHT TOE(S): Chronic | ICD-10-CM

## 2020-07-16 PROCEDURE — 99213 OFFICE O/P EST LOW 20 MIN: CPT

## 2020-07-17 NOTE — ASSESSMENT
[FreeTextEntry1] : There is  some dehiscence at the lateral  aspects of the TMA site with macerated skin edges. \par \par -Pt. seen and evaluated\par -Discussed treatment and condition w/ patient\par -Dbx of the surgical site down to the subcutaneous tissue measuring 2.5cm by 1cm x 0.5cm .\par cont HBOT\par local wound care: betadine\par -cont darco \par -patient had similar presentations at surgical site back in may. cultures of drainage at that time grew Gram Negative stap and candida. p.o antifungal and antibiotics helped resolve drainage. Renewed medication\par -pt to f/u one week\par \par -glycemic control (last a1c was 11.5 5/2020)\par

## 2020-07-17 NOTE — PHYSICAL EXAM
[General Appearance - Alert] : alert [General Appearance - In No Acute Distress] : in no acute distress [Oriented To Time, Place, And Person] : oriented to person, place, and time [Impaired Insight] : insight and judgment were intact [FreeTextEntry1] : lateral aspect of the right TMA stump, persistent wound w/ granular base w/ overlying callus formation. No drainage. non-odorous. no surrounding erythema. no increase in pedal temperature, 2cm x 0.8cm

## 2020-07-23 ENCOUNTER — OUTPATIENT (OUTPATIENT)
Dept: OUTPATIENT SERVICES | Facility: HOSPITAL | Age: 70
LOS: 1 days | Discharge: HOME | End: 2020-07-23

## 2020-07-23 ENCOUNTER — APPOINTMENT (OUTPATIENT)
Dept: PODIATRY | Facility: CLINIC | Age: 70
End: 2020-07-23
Payer: MEDICARE

## 2020-07-23 VITALS
BODY MASS INDEX: 30.24 KG/M2 | DIASTOLIC BLOOD PRESSURE: 81 MMHG | HEIGHT: 71 IN | HEART RATE: 83 BPM | WEIGHT: 216 LBS | SYSTOLIC BLOOD PRESSURE: 141 MMHG

## 2020-07-23 DIAGNOSIS — Z89.421 ACQUIRED ABSENCE OF OTHER RIGHT TOE(S): Chronic | ICD-10-CM

## 2020-07-23 DIAGNOSIS — T14.8XXD OTHER INJURY OF UNSPECIFIED BODY REGION, SUBSEQUENT ENCOUNTER: ICD-10-CM

## 2020-07-23 DIAGNOSIS — T81.31XA DISRUPTION OF EXTERNAL OPERATION (SURGICAL) WOUND, NOT ELSEWHERE CLASSIFIED, INITIAL ENCOUNTER: ICD-10-CM

## 2020-07-23 PROCEDURE — 99213 OFFICE O/P EST LOW 20 MIN: CPT

## 2020-07-24 NOTE — END OF VISIT
[FreeTextEntry3] : -significant improvement since last visit (since starting p.o antibiotics and antifungals) [] : Resident

## 2020-07-24 NOTE — HISTORY OF PRESENT ILLNESS
[FreeTextEntry1] : s/p right 3rd ray resection 12/4 and 2nd, 4th and 5th ray resection 12/11. \par patient subsequently developed dehiscence and underwent revision of dehisced site on 12/17. \par Pt currently at home and has VNS come to home for dressings changes daily and HBO therapy.\par Patient has been weightbearing as tolerated. Was given antibiotics and antifungals  on last visit.

## 2020-07-24 NOTE — ASSESSMENT
[FreeTextEntry1] : -significant improvement since last visit (since starting p.o antibiotics and antifungals)\par -continue local wound care\par -continue HBOT\par -light debridement of skin edges w/ curette \par -glycemic control (last a1c was 11.5 5/2020)\par

## 2020-07-24 NOTE — PHYSICAL EXAM
[General Appearance - Alert] : alert [General Appearance - In No Acute Distress] : in no acute distress [Impaired Insight] : insight and judgment were intact [Oriented To Time, Place, And Person] : oriented to person, place, and time [FreeTextEntry1] : lateral aspect of the right TMA stump, persistent wound w/ granular base. significant improved since last visit. minimal tissue maceration, skin edges are close to reapproximating (~0.2cm between skin edges)

## 2020-08-06 ENCOUNTER — APPOINTMENT (OUTPATIENT)
Dept: PODIATRY | Facility: CLINIC | Age: 70
End: 2020-08-06

## 2020-08-13 ENCOUNTER — OUTPATIENT (OUTPATIENT)
Dept: OUTPATIENT SERVICES | Facility: HOSPITAL | Age: 70
LOS: 1 days | Discharge: HOME | End: 2020-08-13

## 2020-08-13 ENCOUNTER — APPOINTMENT (OUTPATIENT)
Dept: PODIATRY | Facility: CLINIC | Age: 70
End: 2020-08-13
Payer: MEDICARE

## 2020-08-13 DIAGNOSIS — Z89.421 ACQUIRED ABSENCE OF OTHER RIGHT TOE(S): Chronic | ICD-10-CM

## 2020-08-13 DIAGNOSIS — T81.31XA DISRUPTION OF EXTERNAL OPERATION (SURGICAL) WOUND, NOT ELSEWHERE CLASSIFIED, INITIAL ENCOUNTER: ICD-10-CM

## 2020-08-13 PROCEDURE — 97597 DBRDMT OPN WND 1ST 20 CM/<: CPT

## 2020-08-14 PROBLEM — T81.31XA SURGICAL WOUND DEHISCENCE: Status: ACTIVE | Noted: 2020-07-17

## 2020-08-14 NOTE — END OF VISIT
[] : Resident [Resident] : Resident [FreeTextEntry2] : excisional debridement performed of the right surgical wound, down to dermal layer  [FreeTextEntry3] : [facility]

## 2020-08-14 NOTE — ASSESSMENT
[FreeTextEntry1] : s/p TMA Right foot\par \par Patient seen and evaluated\par All findings discussed with patient \par Macerated tissue to Right lateral wound debrided with curette. Flushed with saline and dressed with paulo, dsd, kerlix, ace.\par Debrided 2nd digit distal aspect with a #15 blade. Pt tolerated the procedure well \par Continue local wound care\par Follow up in clinic in 1 week\par

## 2020-08-14 NOTE — HISTORY OF PRESENT ILLNESS
[FreeTextEntry1] : s/p right 3rd ray resection 12/4 and 2nd, 4th and 5th ray resection 12/11. \par patient subsequently developed dehiscence and underwent revision of dehisced site on 12/17. \par Pt currently at home and has VNS come to home for dressings changes daily. Patient reports that HBO therapy was stopped a week ago. He has been wearing a surgical shoe to the Right foot. Denies N/V/F/C/shortness of breath. Denies any other pedal complaints.

## 2020-08-14 NOTE — PHYSICAL EXAM
[General Appearance - Alert] : alert [General Appearance - In No Acute Distress] : in no acute distress [Oriented To Time, Place, And Person] : oriented to person, place, and time [Impaired Insight] : insight and judgment were intact [Ankle Swelling (On Exam)] : not present [FreeTextEntry3] : D [FreeTextEntry1] : Open wound to lateral aspect of Right foot; filling in well. wound down to dermal layer . Macerated wound edges. No drainage, no malodor noted. Hyperkeratotic tissue noted to distal aspect of left 2nd digit.

## 2020-08-20 ENCOUNTER — OUTPATIENT (OUTPATIENT)
Dept: OUTPATIENT SERVICES | Facility: HOSPITAL | Age: 70
LOS: 1 days | Discharge: HOME | End: 2020-08-20

## 2020-08-20 ENCOUNTER — APPOINTMENT (OUTPATIENT)
Dept: PODIATRY | Facility: CLINIC | Age: 70
End: 2020-08-20
Payer: MEDICARE

## 2020-08-20 DIAGNOSIS — Z89.421 ACQUIRED ABSENCE OF OTHER RIGHT TOE(S): Chronic | ICD-10-CM

## 2020-08-20 PROCEDURE — 99212 OFFICE O/P EST SF 10 MIN: CPT

## 2020-08-21 NOTE — PHYSICAL EXAM
[General Appearance - In No Acute Distress] : in no acute distress [General Appearance - Alert] : alert [Impaired Insight] : insight and judgment were intact [Oriented To Time, Place, And Person] : oriented to person, place, and time [Ankle Swelling (On Exam)] : not present [de-identified] : TMA on the Right Foot; Partial Amputation of Hallux and 2nd Digit Left Foot;  [FreeTextEntry1] : Closed Wound on the Right Lateral Midfoot; Hyperkeratotic Skin; No Drainage or Active Bleeding Noted;

## 2020-08-21 NOTE — ASSESSMENT
[FreeTextEntry1] : s/p Partial Amputation; Hallux and 2nd Digit; Left Foot; \par s/p TMA Right foot\par \par Patient seen and evaluated\par Hyperkeratotic Lesions Debrided w/ Dermal Curette; Excess Bleeding Cauterize w/ Silver Nitrate; \par Continue local wound care; Applied Bacitracin / DSD / Kerlix; First Day; Then Apply A&D Ointment; on Right Foot; \par RX'd A&D Ointment; \par Follow Up w/ Clinic in Two Weeks; \par

## 2020-08-21 NOTE — HISTORY OF PRESENT ILLNESS
[FreeTextEntry1] : s/p right 3rd ray resection 12/4 and 2nd, 4th and 5th ray resection 12/11. \par patient subsequently developed dehiscence and underwent revision of dehisced site on 12/17. Returns today for management of open wound

## 2020-08-21 NOTE — END OF VISIT
[] : Resident [Resident] : Resident [FreeTextEntry2] : excisional debridement performed of hyperkeratotic tissue\par recommend OTC vitamin A&D  ointment. No surgical dressing needed

## 2020-09-03 ENCOUNTER — APPOINTMENT (OUTPATIENT)
Dept: PODIATRY | Facility: CLINIC | Age: 70
End: 2020-09-03
Payer: MEDICARE

## 2020-09-03 ENCOUNTER — OUTPATIENT (OUTPATIENT)
Dept: OUTPATIENT SERVICES | Facility: HOSPITAL | Age: 70
LOS: 1 days | Discharge: HOME | End: 2020-09-03

## 2020-09-03 DIAGNOSIS — Z89.421 ACQUIRED ABSENCE OF OTHER RIGHT TOE(S): Chronic | ICD-10-CM

## 2020-09-03 PROCEDURE — 99212 OFFICE O/P EST SF 10 MIN: CPT

## 2020-09-04 NOTE — ASSESSMENT
[FreeTextEntry1] : s/p Partial Amputation; Hallux and 2nd Digit; Left Foot; \par s/p TMA Right foot\par \par Patient seen and evaluated\par Hyperkeratotic Lesions Debrided w/ Dermal Curette; b/l feet\par apply vitamin A&D ointment to right foot \par Follow Up w/ Clinic in 4weeks\par \par Class A\par -non-traumatic amputation of foot/digit\par

## 2020-09-04 NOTE — HISTORY OF PRESENT ILLNESS
[FreeTextEntry1] : s/p right 3rd ray resection 12/4 and 2nd, 4th and 5th ray resection 12/11. \par patient subsequently developed dehiscence and underwent revision of dehisced site on 12/17.

## 2020-09-04 NOTE — PHYSICAL EXAM
[General Appearance - Alert] : alert [General Appearance - In No Acute Distress] : in no acute distress [Oriented To Time, Place, And Person] : oriented to person, place, and time [Impaired Insight] : insight and judgment were intact [Ankle Swelling (On Exam)] : not present [de-identified] : TMA on the Right Foot; Partial Amputation of Hallux and 2nd Digit Left Foot;  [FreeTextEntry1] : Closed Wound on the Right Lateral Midfoot; Hyperkeratotic Skin; No Drainage or Active Bleeding Noted; callus on distal aspect of left 3rd toe

## 2020-09-16 DIAGNOSIS — Z89.431 ACQUIRED ABSENCE OF RIGHT FOOT: ICD-10-CM

## 2020-09-16 DIAGNOSIS — E11.40 TYPE 2 DIABETES MELLITUS WITH DIABETIC NEUROPATHY, UNSPECIFIED: ICD-10-CM

## 2020-09-16 DIAGNOSIS — Z89.419 ACQUIRED ABSENCE OF UNSPECIFIED GREAT TOE: ICD-10-CM

## 2020-09-17 ENCOUNTER — APPOINTMENT (OUTPATIENT)
Dept: ENDOCRINOLOGY | Facility: CLINIC | Age: 70
End: 2020-09-17

## 2020-09-22 ENCOUNTER — OUTPATIENT (OUTPATIENT)
Dept: OUTPATIENT SERVICES | Facility: HOSPITAL | Age: 70
LOS: 1 days | Discharge: HOME | End: 2020-09-22

## 2020-09-22 ENCOUNTER — APPOINTMENT (OUTPATIENT)
Dept: PODIATRY | Facility: CLINIC | Age: 70
End: 2020-09-22
Payer: MEDICARE

## 2020-09-22 DIAGNOSIS — L85.1 ACQUIRED KERATOSIS [KERATODERMA] PALMARIS ET PLANTARIS: ICD-10-CM

## 2020-09-22 DIAGNOSIS — E11.40 TYPE 2 DIABETES MELLITUS WITH DIABETIC NEUROPATHY, UNSPECIFIED: ICD-10-CM

## 2020-09-22 DIAGNOSIS — L89.892 PRESSURE ULCER OF OTHER SITE, STAGE 2: ICD-10-CM

## 2020-09-22 DIAGNOSIS — M20.42 OTHER HAMMER TOE(S) (ACQUIRED), LEFT FOOT: ICD-10-CM

## 2020-09-22 DIAGNOSIS — Z89.421 ACQUIRED ABSENCE OF OTHER RIGHT TOE(S): Chronic | ICD-10-CM

## 2020-09-22 DIAGNOSIS — Z89.419 ACQUIRED ABSENCE OF UNSPECIFIED GREAT TOE: ICD-10-CM

## 2020-09-22 PROCEDURE — 99212 OFFICE O/P EST SF 10 MIN: CPT

## 2020-09-25 PROBLEM — M20.42 HAMMER TOE OF LEFT FOOT: Status: ACTIVE | Noted: 2020-09-25

## 2020-09-25 NOTE — ASSESSMENT
[FreeTextEntry1] : s/p Partial Amputation; Hallux and 2nd Digit; Left Foot; \par s/p TMA Right foot\par \par Patient seen and evaluated\par Hyperkeratotic Lesions Debrided w/ Dermal Curette; b/l feet\par apply vitamin A&D ointment to right foot \par Follow Up w/ Clinic in 4weeks\par Referred to vascular surgery to evaluate peripheral flow to LLE\par \par Class A\par -non-traumatic amputation of foot/digit\par

## 2020-09-25 NOTE — PHYSICAL EXAM
[Delayed in the Left Toes] : capillary refills delayed in the left toes [1+] : left foot dorsalis pedis 1+ [General Appearance - Alert] : alert [General Appearance - In No Acute Distress] : in no acute distress [Oriented To Time, Place, And Person] : oriented to person, place, and time [Impaired Insight] : insight and judgment were intact [Ankle Swelling (On Exam)] : not present [FreeTextEntry3] : left foot cool to touch [de-identified] : TMA on the Right Foot; Partial Amputation of Hallux and 2nd Digit Left Foot;  [FreeTextEntry1] : Closed Wound on the Right Lateral Midfoot; Hyperkeratotic Skin; No Drainage or Active Bleeding Noted; callus on distal aspect of left 3rd toe; no erythema, local swelling to the digit, no drainage, no increase in skin temperature. left 3rd toe in a contracted position, that’s partially reducible

## 2020-09-25 NOTE — HISTORY OF PRESENT ILLNESS
[FreeTextEntry1] : s/p right 3rd ray resection 12/4 and 2nd, 4th and 5th ray resection 12/11.  patient subsequently developed dehiscence and underwent revision of dehisced site on 12/17.

## 2020-10-01 ENCOUNTER — APPOINTMENT (OUTPATIENT)
Dept: VASCULAR SURGERY | Facility: CLINIC | Age: 70
End: 2020-10-01

## 2020-10-12 ENCOUNTER — APPOINTMENT (OUTPATIENT)
Dept: VASCULAR SURGERY | Facility: CLINIC | Age: 70
End: 2020-10-12
Payer: MEDICARE

## 2020-10-12 VITALS
DIASTOLIC BLOOD PRESSURE: 83 MMHG | HEIGHT: 71 IN | SYSTOLIC BLOOD PRESSURE: 154 MMHG | TEMPERATURE: 97 F | WEIGHT: 216 LBS | BODY MASS INDEX: 30.24 KG/M2

## 2020-10-12 PROCEDURE — 99213 OFFICE O/P EST LOW 20 MIN: CPT

## 2020-10-12 PROCEDURE — 93926 LOWER EXTREMITY STUDY: CPT

## 2020-10-12 NOTE — DATA REVIEWED
[FreeTextEntry1] : I performed an arterial duplex which was medically necessary to evaluate for arterial disease. It showed diffuse atherosclerotic disease in the left femoropopliteal and tibial arteries without any flow limiting lesions, patent left PT artery.\par

## 2020-10-12 NOTE — HISTORY OF PRESENT ILLNESS
[FreeTextEntry1] : 71 y/o gentleman with h/o DM, PVD, underwent left PT artery angioplasty in 2019, and now with left second toe wound. \par He underwent right TMA on 12/17/19. He had a right lower extremity angiogram on 11/20/19 that showed patent right femoral and popliteal arteries and two vessel run-off to the foot, no interventions performed. \par  \par

## 2020-10-15 ENCOUNTER — RX RENEWAL (OUTPATIENT)
Age: 70
End: 2020-10-15

## 2020-10-19 ENCOUNTER — OUTPATIENT (OUTPATIENT)
Dept: OUTPATIENT SERVICES | Facility: HOSPITAL | Age: 70
LOS: 1 days | Discharge: HOME | End: 2020-10-19

## 2020-10-19 ENCOUNTER — APPOINTMENT (OUTPATIENT)
Dept: NUTRITION | Facility: CLINIC | Age: 70
End: 2020-10-19

## 2020-10-19 ENCOUNTER — NON-APPOINTMENT (OUTPATIENT)
Age: 70
End: 2020-10-19

## 2020-10-19 ENCOUNTER — OUTPATIENT (OUTPATIENT)
Dept: OUTPATIENT SERVICES | Facility: HOSPITAL | Age: 70
LOS: 1 days | Discharge: HOME | End: 2020-10-19
Payer: MEDICARE

## 2020-10-19 VITALS
SYSTOLIC BLOOD PRESSURE: 126 MMHG | DIASTOLIC BLOOD PRESSURE: 70 MMHG | HEART RATE: 86 BPM | RESPIRATION RATE: 16 BRPM | TEMPERATURE: 98 F | OXYGEN SATURATION: 98 % | WEIGHT: 207.01 LBS | HEIGHT: 71 IN

## 2020-10-19 DIAGNOSIS — Z89.421 ACQUIRED ABSENCE OF OTHER RIGHT TOE(S): Chronic | ICD-10-CM

## 2020-10-19 DIAGNOSIS — I70.245 ATHEROSCLEROSIS OF NATIVE ARTERIES OF LEFT LEG WITH ULCERATION OF OTHER PART OF FOOT: ICD-10-CM

## 2020-10-19 DIAGNOSIS — Z01.818 ENCOUNTER FOR OTHER PREPROCEDURAL EXAMINATION: ICD-10-CM

## 2020-10-19 DIAGNOSIS — Z89.429 ACQUIRED ABSENCE OF OTHER TOE(S), UNSPECIFIED SIDE: Chronic | ICD-10-CM

## 2020-10-19 LAB
ALBUMIN SERPL ELPH-MCNC: 4.9 G/DL — SIGNIFICANT CHANGE UP (ref 3.5–5.2)
ALP SERPL-CCNC: 99 U/L — SIGNIFICANT CHANGE UP (ref 30–115)
ALT FLD-CCNC: 16 U/L — SIGNIFICANT CHANGE UP (ref 0–41)
ANION GAP SERPL CALC-SCNC: 13 MMOL/L — SIGNIFICANT CHANGE UP (ref 7–14)
APTT BLD: 30.8 SEC — SIGNIFICANT CHANGE UP (ref 27–39.2)
AST SERPL-CCNC: 14 U/L — SIGNIFICANT CHANGE UP (ref 0–41)
BASOPHILS # BLD AUTO: 0.02 K/UL — SIGNIFICANT CHANGE UP (ref 0–0.2)
BASOPHILS NFR BLD AUTO: 0.3 % — SIGNIFICANT CHANGE UP (ref 0–1)
BILIRUB SERPL-MCNC: 0.9 MG/DL — SIGNIFICANT CHANGE UP (ref 0.2–1.2)
BUN SERPL-MCNC: 34 MG/DL — HIGH (ref 10–20)
CALCIUM SERPL-MCNC: 9.8 MG/DL — SIGNIFICANT CHANGE UP (ref 8.5–10.1)
CHLORIDE SERPL-SCNC: 104 MMOL/L — SIGNIFICANT CHANGE UP (ref 98–110)
CO2 SERPL-SCNC: 23 MMOL/L — SIGNIFICANT CHANGE UP (ref 17–32)
CREAT SERPL-MCNC: 1.4 MG/DL — SIGNIFICANT CHANGE UP (ref 0.7–1.5)
EOSINOPHIL # BLD AUTO: 0.02 K/UL — SIGNIFICANT CHANGE UP (ref 0–0.7)
EOSINOPHIL NFR BLD AUTO: 0.3 % — SIGNIFICANT CHANGE UP (ref 0–8)
GLUCOSE SERPL-MCNC: 217 MG/DL — HIGH (ref 70–99)
HCT VFR BLD CALC: 40.6 % — LOW (ref 42–52)
HGB BLD-MCNC: 13.3 G/DL — LOW (ref 14–18)
IMM GRANULOCYTES NFR BLD AUTO: 0.5 % — HIGH (ref 0.1–0.3)
INR BLD: 0.87 RATIO — SIGNIFICANT CHANGE UP (ref 0.65–1.3)
LYMPHOCYTES # BLD AUTO: 1.17 K/UL — LOW (ref 1.2–3.4)
LYMPHOCYTES # BLD AUTO: 14.8 % — LOW (ref 20.5–51.1)
MCHC RBC-ENTMCNC: 29.2 PG — SIGNIFICANT CHANGE UP (ref 27–31)
MCHC RBC-ENTMCNC: 32.8 G/DL — SIGNIFICANT CHANGE UP (ref 32–37)
MCV RBC AUTO: 89 FL — SIGNIFICANT CHANGE UP (ref 80–94)
MONOCYTES # BLD AUTO: 0.51 K/UL — SIGNIFICANT CHANGE UP (ref 0.1–0.6)
MONOCYTES NFR BLD AUTO: 6.4 % — SIGNIFICANT CHANGE UP (ref 1.7–9.3)
NEUTROPHILS # BLD AUTO: 6.15 K/UL — SIGNIFICANT CHANGE UP (ref 1.4–6.5)
NEUTROPHILS NFR BLD AUTO: 77.7 % — HIGH (ref 42.2–75.2)
NRBC # BLD: 0 /100 WBCS — SIGNIFICANT CHANGE UP (ref 0–0)
PLATELET # BLD AUTO: 213 K/UL — SIGNIFICANT CHANGE UP (ref 130–400)
POTASSIUM SERPL-MCNC: 4.4 MMOL/L — SIGNIFICANT CHANGE UP (ref 3.5–5)
POTASSIUM SERPL-SCNC: 4.4 MMOL/L — SIGNIFICANT CHANGE UP (ref 3.5–5)
PROT SERPL-MCNC: 7.7 G/DL — SIGNIFICANT CHANGE UP (ref 6–8)
PROTHROM AB SERPL-ACNC: 10 SEC — SIGNIFICANT CHANGE UP (ref 9.95–12.87)
RBC # BLD: 4.56 M/UL — LOW (ref 4.7–6.1)
RBC # FLD: 13.2 % — SIGNIFICANT CHANGE UP (ref 11.5–14.5)
SODIUM SERPL-SCNC: 140 MMOL/L — SIGNIFICANT CHANGE UP (ref 135–146)
WBC # BLD: 7.91 K/UL — SIGNIFICANT CHANGE UP (ref 4.8–10.8)
WBC # FLD AUTO: 7.91 K/UL — SIGNIFICANT CHANGE UP (ref 4.8–10.8)

## 2020-10-19 PROCEDURE — 93010 ELECTROCARDIOGRAM REPORT: CPT

## 2020-10-19 NOTE — H&P PST ADULT - NS MD HP PULSE RADIAL
CKD (chronic kidney disease) CKD (chronic kidney disease) CKD (chronic kidney disease) CKD (chronic kidney disease) CKD (chronic kidney disease) CKD (chronic kidney disease) CKD (chronic kidney disease) CKD (chronic kidney disease) CKD (chronic kidney disease) left normal/right normal

## 2020-10-19 NOTE — H&P PST ADULT - NSICDXPASTSURGICALHX_GEN_ALL_CORE_FT
PAST SURGICAL HISTORY:  History of amputation of toe LT -partial 1st toe    Toe amputation status, right (2008)

## 2020-10-19 NOTE — H&P PST ADULT - NS ABD PE RECTAL EXAM
Pre op instructions reviewed with patient per phone:    To confirm, Your surgeon has instructed you:  Surgery is scheduled 02/18/19at 0700.      Please report to [facility] 1st floor main lobby by 0530.   Pre admit office to Friday afternoon prior to surgery with final arrival time      INSTRUCTIONS IMPORTANT!!!  ¨ Do not eat, drink, or smoke after 12 midnight-including water. OK to brush teeth, no gum, candy or mints!    ¨ Take only these medicines with a small swallow of water-morning of surgery.  Crestor      ____  Do not wear makeup, including mascara.  ____  No powder, lotions or creams to surgical area.  ____  Please remove all jewelry, including piercings and leave at home.  ____  No money or valuables needed. Please leave at home.  ____  Please bring identification and insurance information to hospital.  ____  If going home the same day, arrange for a ride home. You will not be able to   drive if Anesthesia was used.  ____  Children, under 12 years old, must remain in the waiting room with an adult.  They are not allowed in patient areas.  ____  Wear loose fitting clothing. Allow for dressings, bandages.  ____  Stop Aspirin, Ibuprofen, Motrin and Aleve at least 5-7 days before surgery, unless otherwise instructed by your doctor, or the nurse.   You MAY use Tylenol/acetaminophen until day of surgery.  ____  If you take diabetic medication, do not take am of surgery unless instructed by   Doctor.  ____ Stop taking any Fish Oil supplement or any Vitamins that contain Vitamin E at least 5 days prior to surgery.          Bathing Instructions-- The night before surgery and the morning prior to coming to the hospital:   -Do not shave the surgical area.   -Shower and wash your hair and body as usual with anti-bacterial  soap and shampoo.   -Rinse your hair and body completely.   -Use one packet of hibiclens to wash the surgical site (using your hand) gently for 5 minutes.  Do not scrub you  skin too hard.   -Do not use hibiclens on your head, face, or genitals.   -Do not wash with anti-bacterial soap after you use the hibiclens.   -Rinse your body thoroughly.   -Dry with clean, soft towel.  Do not use lotion, cream, deodorant, or powders on   the surgical site.    Use antibacterial soap in place of hibiclens if your surgery is on the head, face or genitals.         Surgical Site Infection    Prevention of surgical site infections:     -Keep incisions clean and dry.   -Do not soak/submerge incisions in water until completely healed.   -Do not apply lotions, powders, creams, or deodorants to site.   -Always make sure hands are cleaned with antibacterial soap/ alcohol-based   prior to touching the surgical site.  (This includes doctors, nurses, staff, and yourself.)    Signs and symptoms:   -Redness and pain around the area where you had surgery   -Drainage of cloudy fluid from your surgical wound   -Fever over 100.4  I have read or had read and explained to me, and understand the above information.         not examined

## 2020-10-19 NOTE — H&P PST ADULT - HISTORY OF PRESENT ILLNESS
70YR old male states "the Dr is going to check my circulation in my LEFT leg, may be he saw something, every now and then I get a cramp in my legs when I walk" for about 2-3 months-presents to pretesting for Left lower extremity angiogram , possible endovascular revascularization.  Denies COVID S/S. Denies sick contacts. Advised to follow preventive measures for COVID- Verbalized understanding of instructions. Exercise claire 2-3 blocks slowly.  Anesthesia Alert  NO--Difficult Airway  NO--History of neck surgery or radiation  YES--Limited ROM of neck  NO--History of Malignant hyperthermia  NO--Personal or family history of Pseudocholinesterase deficiency  NO--Prior Anesthesia Complication  NO--Latex Allergy  NO--Loose teeth  NO--History of Rheumatoid Arthritis  NO--MACHELLE  NO--Other_____

## 2020-10-19 NOTE — H&P PST ADULT - NSICDXPASTMEDICALHX_GEN_ALL_CORE_FT
PAST MEDICAL HISTORY:  Depression     Diabetes     Diabetic foot ulcer     DM (diabetes mellitus) 12/27/18 hgb aic  11.2    Dyslipidemia     GERD (gastroesophageal reflux disease)     HTN (hypertension)

## 2020-10-22 ENCOUNTER — RESULT REVIEW (OUTPATIENT)
Age: 70
End: 2020-10-22

## 2020-10-22 ENCOUNTER — APPOINTMENT (OUTPATIENT)
Dept: PODIATRY | Facility: CLINIC | Age: 70
End: 2020-10-22
Payer: MEDICARE

## 2020-10-22 ENCOUNTER — OUTPATIENT (OUTPATIENT)
Dept: OUTPATIENT SERVICES | Facility: HOSPITAL | Age: 70
LOS: 1 days | Discharge: HOME | End: 2020-10-22
Payer: MEDICARE

## 2020-10-22 ENCOUNTER — OUTPATIENT (OUTPATIENT)
Dept: OUTPATIENT SERVICES | Facility: HOSPITAL | Age: 70
LOS: 1 days | Discharge: HOME | End: 2020-10-22

## 2020-10-22 DIAGNOSIS — Z89.429 ACQUIRED ABSENCE OF OTHER TOE(S), UNSPECIFIED SIDE: Chronic | ICD-10-CM

## 2020-10-22 DIAGNOSIS — E11.42 TYPE 2 DIABETES MELLITUS WITH DIABETIC POLYNEUROPATHY: ICD-10-CM

## 2020-10-22 DIAGNOSIS — Z89.421 ACQUIRED ABSENCE OF OTHER RIGHT TOE(S): Chronic | ICD-10-CM

## 2020-10-22 PROCEDURE — 73630 X-RAY EXAM OF FOOT: CPT | Mod: 26,LT

## 2020-10-22 PROCEDURE — 99213 OFFICE O/P EST LOW 20 MIN: CPT

## 2020-10-23 NOTE — HISTORY OF PRESENT ILLNESS
[FreeTextEntry1] : s/p right 3rd ray resection 12/4 and 2nd, 4th and 5th ray resection 12/11.  patient subsequently developed dehiscence and underwent revision of dehisced site on 12/17. Presents w/ a new complaint of ulceration on the left 2nd toe ulceration

## 2020-10-23 NOTE — ASSESSMENT
[FreeTextEntry1] : left 2nd toe ulcer with local swelling. no drainage. no active signs of infection\par referred for xrays to r/o OM\par will follow up with patient after xrays\par

## 2020-10-23 NOTE — PHYSICAL EXAM
[Delayed in the Left Toes] : capillary refills delayed in the left toes [1+] : left foot dorsalis pedis 1+ [General Appearance - Alert] : alert [General Appearance - In No Acute Distress] : in no acute distress [Oriented To Time, Place, And Person] : oriented to person, place, and time [Impaired Insight] : insight and judgment were intact [Ankle Swelling (On Exam)] : not present [FreeTextEntry3] : left foot cool to touch [de-identified] : TMA on the Right Foot; Partial Amputation of Hallux and 2nd Digit Left Foot;  [TWNoteComboBox1] : Left [TWNoteComboBox2] : 2 [TWNoteComboBox4] : None

## 2020-10-26 DIAGNOSIS — Z02.9 ENCOUNTER FOR ADMINISTRATIVE EXAMINATIONS, UNSPECIFIED: ICD-10-CM

## 2020-10-26 DIAGNOSIS — L89.892 PRESSURE ULCER OF OTHER SITE, STAGE 2: ICD-10-CM

## 2020-10-26 DIAGNOSIS — E11.42 TYPE 2 DIABETES MELLITUS WITH DIABETIC POLYNEUROPATHY: ICD-10-CM

## 2020-10-27 ENCOUNTER — LABORATORY RESULT (OUTPATIENT)
Age: 70
End: 2020-10-27

## 2020-10-27 ENCOUNTER — OUTPATIENT (OUTPATIENT)
Dept: OUTPATIENT SERVICES | Facility: HOSPITAL | Age: 70
LOS: 1 days | Discharge: HOME | End: 2020-10-27

## 2020-10-27 DIAGNOSIS — Z11.59 ENCOUNTER FOR SCREENING FOR OTHER VIRAL DISEASES: ICD-10-CM

## 2020-10-27 DIAGNOSIS — Z89.429 ACQUIRED ABSENCE OF OTHER TOE(S), UNSPECIFIED SIDE: Chronic | ICD-10-CM

## 2020-10-27 DIAGNOSIS — Z89.421 ACQUIRED ABSENCE OF OTHER RIGHT TOE(S): Chronic | ICD-10-CM

## 2020-10-30 ENCOUNTER — RESULT REVIEW (OUTPATIENT)
Age: 70
End: 2020-10-30

## 2020-10-30 ENCOUNTER — APPOINTMENT (OUTPATIENT)
Dept: VASCULAR SURGERY | Facility: HOSPITAL | Age: 70
End: 2020-10-30

## 2020-10-30 ENCOUNTER — OUTPATIENT (OUTPATIENT)
Dept: OUTPATIENT SERVICES | Facility: HOSPITAL | Age: 70
LOS: 1 days | Discharge: HOME | End: 2020-10-30
Payer: MEDICARE

## 2020-10-30 VITALS
SYSTOLIC BLOOD PRESSURE: 151 MMHG | HEART RATE: 84 BPM | WEIGHT: 210.1 LBS | DIASTOLIC BLOOD PRESSURE: 76 MMHG | TEMPERATURE: 98 F | HEIGHT: 71.5 IN | RESPIRATION RATE: 18 BRPM

## 2020-10-30 VITALS
DIASTOLIC BLOOD PRESSURE: 80 MMHG | RESPIRATION RATE: 20 BRPM | HEART RATE: 68 BPM | SYSTOLIC BLOOD PRESSURE: 158 MMHG | OXYGEN SATURATION: 100 %

## 2020-10-30 DIAGNOSIS — Z89.421 ACQUIRED ABSENCE OF OTHER RIGHT TOE(S): Chronic | ICD-10-CM

## 2020-10-30 DIAGNOSIS — Z89.429 ACQUIRED ABSENCE OF OTHER TOE(S), UNSPECIFIED SIDE: Chronic | ICD-10-CM

## 2020-10-30 LAB — GLUCOSE BLDC GLUCOMTR-MCNC: 135 MG/DL — HIGH (ref 70–99)

## 2020-10-30 PROCEDURE — 37229: CPT

## 2020-10-30 PROCEDURE — 28825 PARTIAL AMPUTATION OF TOE: CPT

## 2020-10-30 PROCEDURE — 88311 DECALCIFY TISSUE: CPT | Mod: 26

## 2020-10-30 PROCEDURE — 76937 US GUIDE VASCULAR ACCESS: CPT | Mod: 26

## 2020-10-30 PROCEDURE — 20240 BONE BIOPSY OPEN SUPERFICIAL: CPT | Mod: 59

## 2020-10-30 PROCEDURE — 88304 TISSUE EXAM BY PATHOLOGIST: CPT | Mod: 26

## 2020-10-30 PROCEDURE — 75710 ARTERY X-RAYS ARM/LEG: CPT | Mod: 26,59

## 2020-10-30 PROCEDURE — 36247 INS CATH ABD/L-EXT ART 3RD: CPT | Mod: 59

## 2020-10-30 PROCEDURE — 73630 X-RAY EXAM OF FOOT: CPT | Mod: 26,LT

## 2020-10-30 RX ORDER — OXYCODONE AND ACETAMINOPHEN 5; 325 MG/1; MG/1
1 TABLET ORAL
Qty: 10 | Refills: 0
Start: 2020-10-30

## 2020-10-30 RX ORDER — OXYCODONE AND ACETAMINOPHEN 5; 325 MG/1; MG/1
1 TABLET ORAL ONCE
Refills: 0 | Status: DISCONTINUED | OUTPATIENT
Start: 2020-10-30 | End: 2020-10-30

## 2020-10-30 RX ORDER — ACETAMINOPHEN 500 MG
1 TABLET ORAL
Qty: 30 | Refills: 0
Start: 2020-10-30 | End: 2020-11-08

## 2020-10-30 RX ORDER — MORPHINE SULFATE 50 MG/1
2 CAPSULE, EXTENDED RELEASE ORAL
Refills: 0 | Status: DISCONTINUED | OUTPATIENT
Start: 2020-10-30 | End: 2020-10-30

## 2020-10-30 NOTE — ASU PATIENT PROFILE, ADULT - PMH
Depression    Diabetes    Diabetic foot ulcer    DM (diabetes mellitus)  12/27/18 hgb aic  11.2  Dyslipidemia    GERD (gastroesophageal reflux disease)    HTN (hypertension)

## 2020-10-30 NOTE — BRIEF OPERATIVE NOTE - NSICDXBRIEFPROCEDURE_GEN_ALL_CORE_FT
PROCEDURES:  Biopsy, bone, toe, open 30-Oct-2020 12:20:05  Royal Bhatia  Amputation, toe, left, single 30-Oct-2020 12:19:26  Royal Bhatia  
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  
PROCEDURES:  Femoropopliteal arterial percutaneous transluminal angioplasty (PTA) of left lower extremity with atherectomy 30-Oct-2020 12:00:49  Jabari Espinosa

## 2020-10-30 NOTE — ASU DISCHARGE PLAN (ADULT/PEDIATRIC) - PROCEDURE
Femoropopliteal arterial percutaneous transluminal angioplasty (PTA) of left lower extremity with atherectomy

## 2020-10-30 NOTE — BRIEF OPERATIVE NOTE - OPERATION/FINDINGS
middle phalanx cartilage appeared healthy; however middle phalanx had poor cortical bone integrity
AT occlusion  PT and peroneal patent to the foot
Non-viable necrotic left 2nd digit distal phalanx

## 2020-10-30 NOTE — BRIEF OPERATIVE NOTE - NSICDXBRIEFPOSTOP_GEN_ALL_CORE_FT
POST-OP DIAGNOSIS:  Osteomyelitis 30-Oct-2020 12:19:47  Royal Bhatia  
POST-OP DIAGNOSIS:  Atherosclerosis of artery of extremity with ulceration 30-Oct-2020 12:01:24  Jabari Espinosa  
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

## 2020-10-30 NOTE — BRIEF OPERATIVE NOTE - NSICDXBRIEFPREOP_GEN_ALL_CORE_FT
PRE-OP DIAGNOSIS:  Osteomyelitis 30-Oct-2020 12:19:36  Royal Bhatia  
PRE-OP DIAGNOSIS:  Atherosclerosis of artery of extremity with ulceration 30-Oct-2020 12:01:07  Jabari Espinosa  
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

## 2020-10-30 NOTE — CHART NOTE - NSCHARTNOTEFT_GEN_A_CORE
Podiatry;     Patient has acute osteomyelitis of distal phalanx of left 2nd digit;  s/p left 2nd digit partial amputation 10/30;  Will send P.O. Cefadroxil 500mg BID  Will follow up with post-op pathology report for clear bone margin;  If osteomyelitis presents on proximal margin, will refer to ID;

## 2020-10-30 NOTE — ASU DISCHARGE PLAN (ADULT/PEDIATRIC) - CALL YOUR DOCTOR IF YOU HAVE ANY OF THE FOLLOWING:
Excessive diarrhea/Increased irritability or sluggishness/Inability to tolerate liquids or foods/Bleeding that does not stop/Swelling that gets worse/Fever greater than (need to indicate Fahrenheit or Celsius)/Numbness, tingling, color or temperature change to extremity/Wound/Surgical Site with redness, or foul smelling discharge or pus/Nausea and vomiting that does not stop/Unable to urinate/Pain not relieved by Medications

## 2020-10-30 NOTE — ASU DISCHARGE PLAN (ADULT/PEDIATRIC) - ASU DC SPECIAL INSTRUCTIONSFT
PLEASE follow with Dr Espinosa in 2-3 weeks as recommended PLEASE follow with Dr Espinosa in 2-3 weeks as recommended    podiatry recommendation    Patient has acute osteomyelitis of distal phalanx of left 2nd digit;  s/p left 2nd digit partial amputation 10/30;  Will send P.O. Cefadroxil 500mg BID  Will follow up with post-op pathology report for clear bone margin;  If osteomyelitis presents on proximal margin, will refer to ID;  fu with DR Pandey as outpt

## 2020-10-31 LAB
GRAM STN FLD: SIGNIFICANT CHANGE UP
SPECIMEN SOURCE: SIGNIFICANT CHANGE UP

## 2020-11-02 LAB
-  AMPICILLIN/SULBACTAM: SIGNIFICANT CHANGE UP
-  CEFAZOLIN: SIGNIFICANT CHANGE UP
-  CLINDAMYCIN: SIGNIFICANT CHANGE UP
-  ERYTHROMYCIN: SIGNIFICANT CHANGE UP
-  GENTAMICIN: SIGNIFICANT CHANGE UP
-  OXACILLIN: SIGNIFICANT CHANGE UP
-  PENICILLIN: SIGNIFICANT CHANGE UP
-  RIFAMPIN: SIGNIFICANT CHANGE UP
-  TETRACYCLINE: SIGNIFICANT CHANGE UP
-  TRIMETHOPRIM/SULFAMETHOXAZOLE: SIGNIFICANT CHANGE UP
-  VANCOMYCIN: SIGNIFICANT CHANGE UP
CULTURE RESULTS: SIGNIFICANT CHANGE UP
METHOD TYPE: SIGNIFICANT CHANGE UP
ORGANISM # SPEC MICROSCOPIC CNT: SIGNIFICANT CHANGE UP
ORGANISM # SPEC MICROSCOPIC CNT: SIGNIFICANT CHANGE UP
SPECIMEN SOURCE: SIGNIFICANT CHANGE UP

## 2020-11-04 LAB — SURGICAL PATHOLOGY STUDY: SIGNIFICANT CHANGE UP

## 2020-11-05 DIAGNOSIS — E11.51 TYPE 2 DIABETES MELLITUS WITH DIABETIC PERIPHERAL ANGIOPATHY WITHOUT GANGRENE: ICD-10-CM

## 2020-11-05 DIAGNOSIS — I10 ESSENTIAL (PRIMARY) HYPERTENSION: ICD-10-CM

## 2020-11-05 DIAGNOSIS — E11.621 TYPE 2 DIABETES MELLITUS WITH FOOT ULCER: ICD-10-CM

## 2020-11-05 DIAGNOSIS — F32.9 MAJOR DEPRESSIVE DISORDER, SINGLE EPISODE, UNSPECIFIED: ICD-10-CM

## 2020-11-05 DIAGNOSIS — L97.529 NON-PRESSURE CHRONIC ULCER OF OTHER PART OF LEFT FOOT WITH UNSPECIFIED SEVERITY: ICD-10-CM

## 2020-11-05 DIAGNOSIS — Z79.82 LONG TERM (CURRENT) USE OF ASPIRIN: ICD-10-CM

## 2020-11-05 DIAGNOSIS — I70.245 ATHEROSCLEROSIS OF NATIVE ARTERIES OF LEFT LEG WITH ULCERATION OF OTHER PART OF FOOT: ICD-10-CM

## 2020-11-05 DIAGNOSIS — Z79.4 LONG TERM (CURRENT) USE OF INSULIN: ICD-10-CM

## 2020-11-05 DIAGNOSIS — E78.00 PURE HYPERCHOLESTEROLEMIA, UNSPECIFIED: ICD-10-CM

## 2020-11-09 ENCOUNTER — OUTPATIENT (OUTPATIENT)
Dept: OUTPATIENT SERVICES | Facility: HOSPITAL | Age: 70
LOS: 1 days | Discharge: HOME | End: 2020-11-09

## 2020-11-09 ENCOUNTER — APPOINTMENT (OUTPATIENT)
Dept: PODIATRY | Facility: CLINIC | Age: 70
End: 2020-11-09
Payer: MEDICARE

## 2020-11-09 DIAGNOSIS — Z89.429 ACQUIRED ABSENCE OF OTHER TOE(S), UNSPECIFIED SIDE: Chronic | ICD-10-CM

## 2020-11-09 DIAGNOSIS — Z89.421 ACQUIRED ABSENCE OF OTHER RIGHT TOE(S): Chronic | ICD-10-CM

## 2020-11-09 PROCEDURE — 99024 POSTOP FOLLOW-UP VISIT: CPT

## 2020-11-11 NOTE — PHYSICAL EXAM
[1+] : left foot dorsalis pedis 1+ [General Appearance - Alert] : alert [General Appearance - In No Acute Distress] : in no acute distress [Oriented To Time, Place, And Person] : oriented to person, place, and time [Impaired Insight] : insight and judgment were intact [Ankle Swelling (On Exam)] : not present [de-identified] : TMA on the Right Foot; Partial Amputation of Hallux and 2nd Digit Left Foot;  [FreeTextEntry1] : Sutures intact. no dehiscence, no signs of acute infection L 2nd toe  [TWNoteComboBox1] : False [TWNoteComboBox2] : False [TWNoteComboBox4] : None

## 2020-11-11 NOTE — ASSESSMENT
[FreeTextEntry1] : S/p L 2nd toe partail amp \par Cont Abx\par cont dressing with Betadine\par RTO 1 week to remove stitches \par

## 2020-11-11 NOTE — REASON FOR VISIT
[Follow-Up Visit] : a follow-up visit for [Follow-Up] : a follow-up visit [FreeTextEntry1] : S/p Partial L 2nd toe amp

## 2020-11-16 ENCOUNTER — APPOINTMENT (OUTPATIENT)
Dept: PODIATRY | Facility: CLINIC | Age: 70
End: 2020-11-16
Payer: MEDICARE

## 2020-11-16 ENCOUNTER — OUTPATIENT (OUTPATIENT)
Dept: OUTPATIENT SERVICES | Facility: HOSPITAL | Age: 70
LOS: 1 days | Discharge: HOME | End: 2020-11-16

## 2020-11-16 DIAGNOSIS — Z89.429 ACQUIRED ABSENCE OF OTHER TOE(S), UNSPECIFIED SIDE: Chronic | ICD-10-CM

## 2020-11-16 DIAGNOSIS — Z89.421 ACQUIRED ABSENCE OF OTHER RIGHT TOE(S): Chronic | ICD-10-CM

## 2020-11-16 PROCEDURE — 99024 POSTOP FOLLOW-UP VISIT: CPT

## 2020-11-16 NOTE — ASSESSMENT
[FreeTextEntry1] : S/p L 2nd toe partail amp \par suture removed\par skin well coapted\par no dressing needed\par return 4 weeks

## 2020-11-16 NOTE — PHYSICAL EXAM
[1+] : left foot dorsalis pedis 1+ [General Appearance - Alert] : alert [General Appearance - In No Acute Distress] : in no acute distress [Oriented To Time, Place, And Person] : oriented to person, place, and time [Impaired Insight] : insight and judgment were intact [Ankle Swelling (On Exam)] : not present [de-identified] : TMA on the Right Foot; Partial Amputation of Hallux and 2nd Digit Left Foot;  [FreeTextEntry1] : Sutures intact. no dehiscence, no signs of acute infection L 2nd toe  [TWNoteComboBox4] : False

## 2020-11-25 NOTE — ED ADULT NURSE NOTE - CAS DISCH ACCOMP BY
Tissue Cultured Epidermal Autograft Text: The defect edges were debeveled with a #15 scalpel blade.  Given the location of the defect, shape of the defect and the proximity to free margins a tissue cultured epidermal autograft was deemed most appropriate.  The graft was then trimmed to fit the size of the defect.  The graft was then placed in the primary defect and oriented appropriately. Transport

## 2020-12-06 NOTE — ED ADULT NURSE NOTE - NSFALLRSKOUTCOME_ED_ALL_ED
PROGRESS NOTE  Subjective: used . Chelita Gamino MD [employer]

## 2020-12-17 ENCOUNTER — OUTPATIENT (OUTPATIENT)
Dept: OUTPATIENT SERVICES | Facility: HOSPITAL | Age: 70
LOS: 1 days | Discharge: HOME | End: 2020-12-17

## 2020-12-17 ENCOUNTER — APPOINTMENT (OUTPATIENT)
Dept: PODIATRY | Facility: CLINIC | Age: 70
End: 2020-12-17
Payer: MEDICARE

## 2020-12-17 DIAGNOSIS — Z89.421 ACQUIRED ABSENCE OF OTHER RIGHT TOE(S): Chronic | ICD-10-CM

## 2020-12-17 DIAGNOSIS — Z89.429 ACQUIRED ABSENCE OF OTHER TOE(S), UNSPECIFIED SIDE: Chronic | ICD-10-CM

## 2020-12-17 PROCEDURE — 99024 POSTOP FOLLOW-UP VISIT: CPT

## 2020-12-18 NOTE — HISTORY OF PRESENT ILLNESS
[FreeTextEntry1] : Patient returns back to clinic for a s/p left 2nd digit partial amputation;\par Patient says the wounds are all resolved and currently denies and F/N/V and shortness of breath;\par

## 2020-12-18 NOTE — ASSESSMENT
[FreeTextEntry1] : Left 2nd Digit Partial Amputation Stump; Wound Resolved;\par \par Patient was seen and evaluated;\par All Wounds are Resolved; Currently No Open Wounds;\par Advised Patient to follow up w/ Shoe Insert\par Nails Debrided on the Left Foot; \par Advised Patient to return back in 3 months; To be Cautious or suspicous of any wounds; \par \par Class A\par -non-traumatic amputation of foot/digit\par

## 2020-12-18 NOTE — END OF VISIT
[] : Resident [FreeTextEntry3] : no open wounds. no signs of foot infection\par amputation sites remain closed\par pt to follow up regarding DM shoes with custom inserts w/ toe fill\par instructed to check his feet daily\par return 3 months

## 2020-12-18 NOTE — PHYSICAL EXAM
[1+] : left foot dorsalis pedis 1+ [General Appearance - Alert] : alert [General Appearance - In No Acute Distress] : in no acute distress [Oriented To Time, Place, And Person] : oriented to person, place, and time [Impaired Insight] : insight and judgment were intact [Ankle Swelling (On Exam)] : not present [de-identified] : TMA on the Right Foot; Partial Amputation of Hallux and 2nd Digit Left Foot;  [FreeTextEntry1] : No open Wounds or Lesions Noted;

## 2021-02-09 ENCOUNTER — APPOINTMENT (OUTPATIENT)
Dept: PODIATRY | Facility: CLINIC | Age: 71
End: 2021-02-09
Payer: MEDICARE

## 2021-02-09 ENCOUNTER — OUTPATIENT (OUTPATIENT)
Dept: OUTPATIENT SERVICES | Facility: HOSPITAL | Age: 71
LOS: 1 days | Discharge: HOME | End: 2021-02-09

## 2021-02-09 ENCOUNTER — OUTPATIENT (OUTPATIENT)
Dept: OUTPATIENT SERVICES | Facility: HOSPITAL | Age: 71
LOS: 1 days | Discharge: HOME | End: 2021-02-09
Payer: MEDICARE

## 2021-02-09 DIAGNOSIS — Z89.421 ACQUIRED ABSENCE OF OTHER RIGHT TOE(S): Chronic | ICD-10-CM

## 2021-02-09 DIAGNOSIS — Z89.429 ACQUIRED ABSENCE OF OTHER TOE(S), UNSPECIFIED SIDE: Chronic | ICD-10-CM

## 2021-02-09 DIAGNOSIS — L97.529 NON-PRESSURE CHRONIC ULCER OF OTHER PART OF LEFT FOOT WITH UNSPECIFIED SEVERITY: ICD-10-CM

## 2021-02-09 PROCEDURE — 99213 OFFICE O/P EST LOW 20 MIN: CPT | Mod: 25

## 2021-02-09 PROCEDURE — 73660 X-RAY EXAM OF TOE(S): CPT | Mod: 26,LT

## 2021-02-09 PROCEDURE — 97597 DBRDMT OPN WND 1ST 20 CM/<: CPT

## 2021-02-09 NOTE — HISTORY OF PRESENT ILLNESS
[FreeTextEntry1] : \par Mr. Ceja is a 71 year old male patient who presents with left 2nd digit distal tip dorsum ulcer, which he noticed several days ago.

## 2021-02-09 NOTE — ASSESSMENT
[FreeTextEntry1] : Assessment: left 2nd digit distal tip dorsal necrotic ulcer;\par \par Plan\par -Patient was seen, evaluated and treated with all questions and concerns addressed\par -Left 2nd digit necrotic tissue has been debrided to the level of subcutaneous tissue; no more necrotic tissue base; no active bleeding post debridement; granular tissue base;\par -Dressed with xeroform / Toe cover stablized with medical grade tape;\par -Dispensed toe cover and medical grade tape to patient;\par -Rx Xray of left toe to rule out osteomyelitis;\par -F/u post Xray

## 2021-02-09 NOTE — PHYSICAL EXAM
[General Appearance - Alert] : alert [General Appearance - In No Acute Distress] : in no acute distress [General Appearance - Well Nourished] : well nourished [General Appearance - Well Developed] : well developed [General Appearance - Well-Appearing] : healthy appearing [] : normal voice and communication [1+] : left foot dorsalis pedis 1+ [Ankle Swelling (On Exam)] : not present [de-identified] : No pain on left 2nd ulcer, no pain on palpation [FreeTextEntry1] : 2nd toe  [TWNoteComboBox1] : Left [FreeTextEntry2] : 0.5cm x 0.5cm  [TWNoteComboBox4] : None [TWNoteComboBox5] : No [de-identified] : No [de-identified] : None [de-identified] : 100% [de-identified] : False [Diminished Throughout Right Foot] : normal sensation with monofilament testing throughout right foot [Diminished Throughout Left Foot] : normal sensation with monofilament testing throughout left foot

## 2021-02-11 ENCOUNTER — APPOINTMENT (OUTPATIENT)
Dept: PODIATRY | Facility: CLINIC | Age: 71
End: 2021-02-11

## 2021-02-18 ENCOUNTER — APPOINTMENT (OUTPATIENT)
Dept: PODIATRY | Facility: CLINIC | Age: 71
End: 2021-02-18

## 2021-02-18 DIAGNOSIS — L97.529 NON-PRESSURE CHRONIC ULCER OF OTHER PART OF LEFT FOOT WITH UNSPECIFIED SEVERITY: ICD-10-CM

## 2021-02-18 DIAGNOSIS — Z02.9 ENCOUNTER FOR ADMINISTRATIVE EXAMINATIONS, UNSPECIFIED: ICD-10-CM

## 2021-02-18 DIAGNOSIS — L89.892 PRESSURE ULCER OF OTHER SITE, STAGE 2: ICD-10-CM

## 2021-02-23 ENCOUNTER — OUTPATIENT (OUTPATIENT)
Dept: OUTPATIENT SERVICES | Facility: HOSPITAL | Age: 71
LOS: 1 days | Discharge: HOME | End: 2021-02-23

## 2021-02-23 ENCOUNTER — APPOINTMENT (OUTPATIENT)
Dept: PODIATRY | Facility: CLINIC | Age: 71
End: 2021-02-23
Payer: MEDICARE

## 2021-02-23 DIAGNOSIS — Z89.421 ACQUIRED ABSENCE OF OTHER RIGHT TOE(S): Chronic | ICD-10-CM

## 2021-02-23 DIAGNOSIS — Z89.429 ACQUIRED ABSENCE OF OTHER TOE(S), UNSPECIFIED SIDE: Chronic | ICD-10-CM

## 2021-02-23 PROCEDURE — 99213 OFFICE O/P EST LOW 20 MIN: CPT

## 2021-02-23 NOTE — ASSESSMENT
[FreeTextEntry1] : Assessment: left 2nd digit distal tip dorsal necrotic ulcer;\par \par dorsal eschar removed and macerated tissue excised. There is underlying healthy granular tissue. No obvious opens/simus tracking appreciate\par suspicious for OM-->instructed to follow up with an MRI this week\par will call for appointment after reviewing MRI. pt may need a partial amputation

## 2021-02-23 NOTE — HISTORY OF PRESENT ILLNESS
[FreeTextEntry1] : \par Mr. Ceja is a 71 year old male patient who presents with left 2nd digit distal tip dorsum ulcer. patient had not followed up with an MRI

## 2021-02-23 NOTE — PHYSICAL EXAM
[General Appearance - Alert] : alert [General Appearance - In No Acute Distress] : in no acute distress [General Appearance - Well Nourished] : well nourished [General Appearance - Well Developed] : well developed [General Appearance - Well-Appearing] : healthy appearing [] : normal voice and communication [1+] : left foot dorsalis pedis 1+ [Ankle Swelling (On Exam)] : not present [de-identified] : No pain on left 2nd ulcer, no pain on palpation [FreeTextEntry1] : 2nd toe  [FreeTextEntry2] : 0.5cm x 0.5cm  [TWNoteComboBox1] : Left [TWNoteComboBox4] : None [TWNoteComboBox5] : No [de-identified] : No [de-identified] : Macerated [de-identified] : None [de-identified] : 100% [Diminished Throughout Right Foot] : normal sensation with monofilament testing throughout right foot [Diminished Throughout Left Foot] : normal sensation with monofilament testing throughout left foot

## 2021-03-03 ENCOUNTER — RESULT REVIEW (OUTPATIENT)
Age: 71
End: 2021-03-03

## 2021-03-03 ENCOUNTER — OUTPATIENT (OUTPATIENT)
Dept: OUTPATIENT SERVICES | Facility: HOSPITAL | Age: 71
LOS: 1 days | Discharge: HOME | End: 2021-03-03
Payer: MEDICARE

## 2021-03-03 DIAGNOSIS — L97.529 NON-PRESSURE CHRONIC ULCER OF OTHER PART OF LEFT FOOT WITH UNSPECIFIED SEVERITY: ICD-10-CM

## 2021-03-03 DIAGNOSIS — Z89.421 ACQUIRED ABSENCE OF OTHER RIGHT TOE(S): Chronic | ICD-10-CM

## 2021-03-03 DIAGNOSIS — M79.672 PAIN IN LEFT FOOT: ICD-10-CM

## 2021-03-03 DIAGNOSIS — Z89.429 ACQUIRED ABSENCE OF OTHER TOE(S), UNSPECIFIED SIDE: Chronic | ICD-10-CM

## 2021-03-03 PROCEDURE — 73718 MRI LOWER EXTREMITY W/O DYE: CPT | Mod: 26,LT

## 2021-03-04 ENCOUNTER — OUTPATIENT (OUTPATIENT)
Dept: OUTPATIENT SERVICES | Facility: HOSPITAL | Age: 71
LOS: 1 days | Discharge: HOME | End: 2021-03-04

## 2021-03-04 ENCOUNTER — APPOINTMENT (OUTPATIENT)
Dept: PODIATRY | Facility: CLINIC | Age: 71
End: 2021-03-04
Payer: MEDICARE

## 2021-03-04 DIAGNOSIS — Z89.429 ACQUIRED ABSENCE OF OTHER TOE(S), UNSPECIFIED SIDE: Chronic | ICD-10-CM

## 2021-03-04 DIAGNOSIS — Z89.421 ACQUIRED ABSENCE OF OTHER RIGHT TOE(S): Chronic | ICD-10-CM

## 2021-03-04 PROCEDURE — 99213 OFFICE O/P EST LOW 20 MIN: CPT

## 2021-03-04 NOTE — ASSESSMENT
[FreeTextEntry1] : Assessment: left 2nd digit distal tip dorsal necrotic ulcer;\par \par dorsal eschar removed and macerated tissue excised. There is underlying healthy granular tissue. No obvious opens/simus tracking appreciate\par MRI-->OM of the left 2nd middle and proximal phalanx\par MRI discussed with patient\par will need a 2nd toe amputation, as patient has a poor soft tissue envelope of the 2nd toe and toe is non- salvageable. Discussed risks of surgery, including pain, bleeding, non-healing amputation stump, and ulceration to the 3rd toe. May elect to perform flexor tenotomy to the third toe to prevent further contraction.  \par pt sent to the ED for admission. Will schedule for OR once admitted

## 2021-03-04 NOTE — PHYSICAL EXAM
[General Appearance - Alert] : alert [General Appearance - In No Acute Distress] : in no acute distress [General Appearance - Well Nourished] : well nourished [General Appearance - Well Developed] : well developed [General Appearance - Well-Appearing] : healthy appearing [] : normal voice and communication [1+] : left foot dorsalis pedis 1+ [Ankle Swelling (On Exam)] : not present [de-identified] : No pain on left 2nd ulcer, no pain on palpation [de-identified] : local swelling to the second toe w/ a dorsal necrotic patch [FreeTextEntry1] : 2nd toe  [FreeTextEntry2] : 0.5cm x 0.5cm  [TWNoteComboBox1] : Left [TWNoteComboBox4] : None [TWNoteComboBox5] : No [de-identified] : No [de-identified] : Macerated [de-identified] : None [de-identified] : 100% [Diminished Throughout Right Foot] : normal sensation with monofilament testing throughout right foot [Diminished Throughout Left Foot] : normal sensation with monofilament testing throughout left foot

## 2021-03-04 NOTE — HISTORY OF PRESENT ILLNESS
[FreeTextEntry1] : \par Mr. Ceja is a 71 year old male patient who presents with left 2nd digit distal tip dorsum ulcer.

## 2021-03-08 ENCOUNTER — INPATIENT (INPATIENT)
Facility: HOSPITAL | Age: 71
LOS: 3 days | Discharge: HOME HEALTH | End: 2021-03-12
Attending: HOSPITALIST | Admitting: HOSPITALIST
Payer: MEDICARE

## 2021-03-08 VITALS
OXYGEN SATURATION: 99 % | TEMPERATURE: 98 F | DIASTOLIC BLOOD PRESSURE: 88 MMHG | HEART RATE: 92 BPM | HEIGHT: 71.5 IN | RESPIRATION RATE: 20 BRPM | SYSTOLIC BLOOD PRESSURE: 194 MMHG

## 2021-03-08 DIAGNOSIS — Z89.421 ACQUIRED ABSENCE OF OTHER RIGHT TOE(S): Chronic | ICD-10-CM

## 2021-03-08 DIAGNOSIS — Z89.429 ACQUIRED ABSENCE OF OTHER TOE(S), UNSPECIFIED SIDE: Chronic | ICD-10-CM

## 2021-03-08 LAB
ALBUMIN SERPL ELPH-MCNC: 4.6 G/DL — SIGNIFICANT CHANGE UP (ref 3.5–5.2)
ALP SERPL-CCNC: 99 U/L — SIGNIFICANT CHANGE UP (ref 30–115)
ALT FLD-CCNC: 29 U/L — SIGNIFICANT CHANGE UP (ref 0–41)
ANION GAP SERPL CALC-SCNC: 13 MMOL/L — SIGNIFICANT CHANGE UP (ref 7–14)
APTT BLD: 33.7 SEC — SIGNIFICANT CHANGE UP (ref 27–39.2)
AST SERPL-CCNC: 24 U/L — SIGNIFICANT CHANGE UP (ref 0–41)
BASOPHILS # BLD AUTO: 0.02 K/UL — SIGNIFICANT CHANGE UP (ref 0–0.2)
BASOPHILS NFR BLD AUTO: 0.2 % — SIGNIFICANT CHANGE UP (ref 0–1)
BILIRUB SERPL-MCNC: 0.4 MG/DL — SIGNIFICANT CHANGE UP (ref 0.2–1.2)
BLD GP AB SCN SERPL QL: SIGNIFICANT CHANGE UP
BUN SERPL-MCNC: 27 MG/DL — HIGH (ref 10–20)
CALCIUM SERPL-MCNC: 9.5 MG/DL — SIGNIFICANT CHANGE UP (ref 8.5–10.1)
CHLORIDE SERPL-SCNC: 104 MMOL/L — SIGNIFICANT CHANGE UP (ref 98–110)
CO2 SERPL-SCNC: 23 MMOL/L — SIGNIFICANT CHANGE UP (ref 17–32)
CREAT SERPL-MCNC: 1.5 MG/DL — SIGNIFICANT CHANGE UP (ref 0.7–1.5)
EOSINOPHIL # BLD AUTO: 0.04 K/UL — SIGNIFICANT CHANGE UP (ref 0–0.7)
EOSINOPHIL NFR BLD AUTO: 0.4 % — SIGNIFICANT CHANGE UP (ref 0–8)
GLUCOSE BLDC GLUCOMTR-MCNC: 155 MG/DL — HIGH (ref 70–99)
GLUCOSE SERPL-MCNC: 202 MG/DL — HIGH (ref 70–99)
HCT VFR BLD CALC: 39.2 % — LOW (ref 42–52)
HGB BLD-MCNC: 12.7 G/DL — LOW (ref 14–18)
IMM GRANULOCYTES NFR BLD AUTO: 0.4 % — HIGH (ref 0.1–0.3)
INR BLD: 0.97 RATIO — SIGNIFICANT CHANGE UP (ref 0.65–1.3)
LYMPHOCYTES # BLD AUTO: 0.77 K/UL — LOW (ref 1.2–3.4)
LYMPHOCYTES # BLD AUTO: 8.2 % — LOW (ref 20.5–51.1)
MCHC RBC-ENTMCNC: 29.3 PG — SIGNIFICANT CHANGE UP (ref 27–31)
MCHC RBC-ENTMCNC: 32.4 G/DL — SIGNIFICANT CHANGE UP (ref 32–37)
MCV RBC AUTO: 90.3 FL — SIGNIFICANT CHANGE UP (ref 80–94)
MONOCYTES # BLD AUTO: 0.64 K/UL — HIGH (ref 0.1–0.6)
MONOCYTES NFR BLD AUTO: 6.8 % — SIGNIFICANT CHANGE UP (ref 1.7–9.3)
NEUTROPHILS # BLD AUTO: 7.92 K/UL — HIGH (ref 1.4–6.5)
NEUTROPHILS NFR BLD AUTO: 84 % — HIGH (ref 42.2–75.2)
NRBC # BLD: 0 /100 WBCS — SIGNIFICANT CHANGE UP (ref 0–0)
PLATELET # BLD AUTO: 199 K/UL — SIGNIFICANT CHANGE UP (ref 130–400)
POTASSIUM SERPL-MCNC: 5.2 MMOL/L — HIGH (ref 3.5–5)
POTASSIUM SERPL-SCNC: 5.2 MMOL/L — HIGH (ref 3.5–5)
PROT SERPL-MCNC: 7.1 G/DL — SIGNIFICANT CHANGE UP (ref 6–8)
PROTHROM AB SERPL-ACNC: 11.2 SEC — SIGNIFICANT CHANGE UP (ref 9.95–12.87)
RBC # BLD: 4.34 M/UL — LOW (ref 4.7–6.1)
RBC # FLD: 13.2 % — SIGNIFICANT CHANGE UP (ref 11.5–14.5)
SARS-COV-2 RNA SPEC QL NAA+PROBE: SIGNIFICANT CHANGE UP
SODIUM SERPL-SCNC: 140 MMOL/L — SIGNIFICANT CHANGE UP (ref 135–146)
WBC # BLD: 9.43 K/UL — SIGNIFICANT CHANGE UP (ref 4.8–10.8)
WBC # FLD AUTO: 9.43 K/UL — SIGNIFICANT CHANGE UP (ref 4.8–10.8)

## 2021-03-08 PROCEDURE — 93010 ELECTROCARDIOGRAM REPORT: CPT

## 2021-03-08 PROCEDURE — 99285 EMERGENCY DEPT VISIT HI MDM: CPT | Mod: CS,GC

## 2021-03-08 PROCEDURE — 73630 X-RAY EXAM OF FOOT: CPT | Mod: 26,LT

## 2021-03-08 PROCEDURE — 71045 X-RAY EXAM CHEST 1 VIEW: CPT | Mod: 26

## 2021-03-08 PROCEDURE — 99223 1ST HOSP IP/OBS HIGH 75: CPT

## 2021-03-08 PROCEDURE — 99221 1ST HOSP IP/OBS SF/LOW 40: CPT

## 2021-03-08 RX ORDER — INSULIN LISPRO 100/ML
8 VIAL (ML) SUBCUTANEOUS
Refills: 0 | Status: DISCONTINUED | OUTPATIENT
Start: 2021-03-08 | End: 2021-03-10

## 2021-03-08 RX ORDER — ATORVASTATIN CALCIUM 80 MG/1
40 TABLET, FILM COATED ORAL AT BEDTIME
Refills: 0 | Status: DISCONTINUED | OUTPATIENT
Start: 2021-03-08 | End: 2021-03-10

## 2021-03-08 RX ORDER — LISINOPRIL 2.5 MG/1
40 TABLET ORAL DAILY
Refills: 0 | Status: DISCONTINUED | OUTPATIENT
Start: 2021-03-08 | End: 2021-03-10

## 2021-03-08 RX ORDER — VANCOMYCIN HCL 1 G
1000 VIAL (EA) INTRAVENOUS EVERY 12 HOURS
Refills: 0 | Status: DISCONTINUED | OUTPATIENT
Start: 2021-03-08 | End: 2021-03-10

## 2021-03-08 RX ORDER — INSULIN GLARGINE 100 [IU]/ML
33 INJECTION, SOLUTION SUBCUTANEOUS AT BEDTIME
Refills: 0 | Status: DISCONTINUED | OUTPATIENT
Start: 2021-03-08 | End: 2021-03-10

## 2021-03-08 RX ORDER — CEFEPIME 1 G/1
2000 INJECTION, POWDER, FOR SOLUTION INTRAMUSCULAR; INTRAVENOUS EVERY 8 HOURS
Refills: 0 | Status: DISCONTINUED | OUTPATIENT
Start: 2021-03-08 | End: 2021-03-10

## 2021-03-08 RX ORDER — METRONIDAZOLE 500 MG
500 TABLET ORAL EVERY 8 HOURS
Refills: 0 | Status: DISCONTINUED | OUTPATIENT
Start: 2021-03-08 | End: 2021-03-10

## 2021-03-08 RX ORDER — VANCOMYCIN HCL 1 G
1000 VIAL (EA) INTRAVENOUS ONCE
Refills: 0 | Status: COMPLETED | OUTPATIENT
Start: 2021-03-08 | End: 2021-03-08

## 2021-03-08 RX ORDER — ASPIRIN/CALCIUM CARB/MAGNESIUM 324 MG
81 TABLET ORAL DAILY
Refills: 0 | Status: DISCONTINUED | OUTPATIENT
Start: 2021-03-08 | End: 2021-03-10

## 2021-03-08 RX ORDER — ENOXAPARIN SODIUM 100 MG/ML
40 INJECTION SUBCUTANEOUS DAILY
Refills: 0 | Status: DISCONTINUED | OUTPATIENT
Start: 2021-03-08 | End: 2021-03-10

## 2021-03-08 RX ORDER — PIPERACILLIN AND TAZOBACTAM 4; .5 G/20ML; G/20ML
3.38 INJECTION, POWDER, LYOPHILIZED, FOR SOLUTION INTRAVENOUS ONCE
Refills: 0 | Status: COMPLETED | OUTPATIENT
Start: 2021-03-08 | End: 2021-03-08

## 2021-03-08 RX ORDER — NIFEDIPINE 30 MG
60 TABLET, EXTENDED RELEASE 24 HR ORAL DAILY
Refills: 0 | Status: DISCONTINUED | OUTPATIENT
Start: 2021-03-08 | End: 2021-03-10

## 2021-03-08 RX ORDER — PANTOPRAZOLE SODIUM 20 MG/1
40 TABLET, DELAYED RELEASE ORAL
Refills: 0 | Status: DISCONTINUED | OUTPATIENT
Start: 2021-03-08 | End: 2021-03-08

## 2021-03-08 RX ORDER — INSULIN LISPRO 100/ML
VIAL (ML) SUBCUTANEOUS
Refills: 0 | Status: DISCONTINUED | OUTPATIENT
Start: 2021-03-08 | End: 2021-03-10

## 2021-03-08 RX ADMIN — ATORVASTATIN CALCIUM 40 MILLIGRAM(S): 80 TABLET, FILM COATED ORAL at 21:19

## 2021-03-08 RX ADMIN — Medication 100 MILLIGRAM(S): at 21:19

## 2021-03-08 RX ADMIN — Medication 250 MILLIGRAM(S): at 18:27

## 2021-03-08 RX ADMIN — INSULIN GLARGINE 33 UNIT(S): 100 INJECTION, SOLUTION SUBCUTANEOUS at 21:19

## 2021-03-08 RX ADMIN — PIPERACILLIN AND TAZOBACTAM 200 GRAM(S): 4; .5 INJECTION, POWDER, LYOPHILIZED, FOR SOLUTION INTRAVENOUS at 17:49

## 2021-03-08 RX ADMIN — CEFEPIME 100 MILLIGRAM(S): 1 INJECTION, POWDER, FOR SOLUTION INTRAMUSCULAR; INTRAVENOUS at 21:21

## 2021-03-08 NOTE — ED PROVIDER NOTE - ATTENDING CONTRIBUTION TO CARE
71M PMH DM HTN HL, sent for admission by his podiatrist for L 2nd toe amputation for osteomyelitis. no pain. no discharge. no fever, chills. no cp, sob. not currently on abx. no complaints.     on exam, AFVSS, well jessica nad, ncat, eomi, perrla, mmm, lctab, rrr nl s1s2 no mrg, abd soft ntnd, aaox3, no focal deficits, no le edema or calf ttp, L foot dressing in place after seen by podiatry    a/p; OM, preop labs, XR, iv abx, admit to medicine

## 2021-03-08 NOTE — H&P ADULT - HISTORY OF PRESENT ILLNESS
Patient is a 72yo male with PMH of insulin-dependent diabetes mellitus complicated by diabetic foot ulcers s/p amputation of half of left great toe, entire right great toe, right 3rd toe, right 4th toe, peripheral vascular disease, hypertension, hyperlipidemia, GERD, and depression who was instructed to come to the ED by his podiatrist Marko Bhatia for osteomyelitis of the left 2nd toe with possible amputation. The patient is unsure of when his foot ulcer began, but knows that he has been treated for them for quite some time. He denies any other symptoms at this time, including fever, chills, chest pain, palpitations, shortness of breath, or abdominal pain.    In the ED, vital signs were Tmax 98.3F, HR 92, /88, RR 20, SpO2 99% on room air. Patient was given vancomycin 1g IV and Zosyn 3.375mg IV. Patient was evaluated by the podiatry team in the ED.

## 2021-03-08 NOTE — H&P ADULT - ASSESSMENT
Patient is a 72yo male with PMH of insulin-dependent diabetes mellitus complicated by diabetic foot ulcers s/p amputation of half of left great toe, entire right great toe, right 3rd toe, right 4th toe, peripheral vascular disease, hypertension, hyperlipidemia, GERD, and depression who was instructed to come to the ED by his podiatrist Marko Bhatia for osteomyelitis of the left 2nd toe with possible amputation.    #Gangrene of left 2nd toe  - Podiatry consult appreciated    #Diabetes mellitus type 2  - Hemoglobin A1c:   - Continue with insulin glargine __ units SQ QHS  - Continue with insulin lispro __ units SQ TID  - Insulin sliding scale coverage  - Monitor fingerstick glucose  - If fingerstick glucose >180, will start basal-bolus insulin     #Hypertension, uncontrolled    #Peripheral vascular disease Patient is a 70yo male with PMH of insulin-dependent diabetes mellitus complicated by diabetic foot ulcers s/p amputation of half of left great toe, entire right great toe, right 3rd toe, right 4th toe, peripheral vascular disease, hypertension, hyperlipidemia, GERD, and depression who was instructed to come to the ED by his podiatrist Marko Bhatia for osteomyelitis of the left 2nd toe with possible amputation.    PATIENT DOES NOT RECALL HIS MEDICATIONS. [facility] CLOSED AT THIS TIME. WIFE WILL BRING MEDICATIONS TO HOSPITAL TOMORROW. PLEASE COMPLETE RECONCILIATION IN AM.    #Gangrene of left 2nd toe  - No sepsis present on admission (WBC 9, afebrile, HR >90, no tachypnea)  - Podiatry consult appreciated  - Wound care per podiatry  - Patient scheduled for OR for debridement and possible amputation on 3/11/2021  - Given history of MDRO in past requiring IV ertapenem on discharge, will consult ID  - Start vancomycin 1g IV Q12H, cefepime 2g IV Q8H, and metronidazole 500mg IV Q8H  - Get vancomycin trough before 4th dose  - Follow blood cultures  - MRSA nares  - Discontinue vancomycin if MRSA nares negative    #Diabetes mellitus type 2  - Hemoglobin A1c 3/13/2021  - Patient takes insulin 70/30 50 units in AM and 40 units in PM, as well as other PO medications  - Will resume insulin regimen from previous admission  - Start insulin glargine 33 units SQ QHS  - Start insulin lispro 8 units SQ TID  - Insulin sliding scale coverage  - Monitor fingerstick glucose  - Repeat A1c in AM     #Hypertension, uncontrolled  - Continue with nifedipine 60mg PO once daily and lisinopril 40mg PO once daily     #Peripheral vascular disease  - Continue with atorvastatin 40mg PO at bedtime and aspirin 81mg PO once daily     #Hyperlipidemia  - Continue with atorvastatin 40mg PO at bedtime     #Misc  - DVT Prophylaxis: Lovenox 40mg SQ once daily   - GI Prophylaxis: pantoprazole 40mg PO once daily   - Diet: DASH/TLC, consistent carbohydrate  - Activity: ambulate as tolerated, WBAT to left lower extremity  - IV Fluids: not indicated   - Code Status: Full Code    Dispo: admit to medicine, OR on Thursday

## 2021-03-08 NOTE — CONSULT NOTE ADULT - SUBJECTIVE AND OBJECTIVE BOX
Podiatry Consult Note    Subjective:  JOSÉ MANUEL PORTER is a pleasant well-nourished, well developed 71y Male in no acute distress, alert awake, and oriented to person, place and time.   Patient is a 71y old  Male who presents with a chief complaint of left 2nd digit gangrene;  HPI:  Sent by Dr. Bhatia for sx intervention on left 2nd digit gangrene; evaluated left foot 2nd digit wound;     PAST MEDICAL & SURGICAL HISTORY:  Diabetes  GERD (gastroesophageal reflux disease)  Depression  Diabetic foot ulcer  Dyslipidemia  HTN (hypertension)  DM (diabetes mellitus)  12/27/18 hgb aic  11.2  History of amputation of toe  LT -partial 1st toe  Toe amputation status, right  (2008)    Objective:  Vital Signs Last 24 Hrs  T(C): 36.8 (08 Mar 2021 13:57), Max: 36.8 (08 Mar 2021 13:57)  T(F): 98.3 (08 Mar 2021 13:57), Max: 98.3 (08 Mar 2021 13:57)  HR: 92 (08 Mar 2021 13:57) (92 - 92)  BP: 194/88 (08 Mar 2021 13:57) (194/88 - 194/88)  BP(mean): --  RR: 20 (08 Mar 2021 13:57) (20 - 20)  SpO2: 99% (08 Mar 2021 13:57) (99% - 99%)               Physical Exam - Lower Extremity Focused:   Derm:   Left 2nd digit gangrene;  Vascular: DP and PT Pulses Diminished;  Neuro: light touch diminished    Assessment:  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 All Questions and Concerns Addressed and Answered  Discussed diagnosis and treatment with patient  Wound Flushed w/ normal saline; Betadine soaked 4x4 / DSD / Kerlix;  Local Wound Care; As Stated Above; q24  XR Imaging Left foot; will follow up with result;   Sx scheduled on Thursday 3/11 @ 4:30PM; excisional debridement of soft tissue and bone with possible 2nd digit amputation of left foot;  Request medical clearance;  Request pre-lab optimization and OR stratification;  NPO @ [facility] on Wed 3/10;   Weight bearing status; WBAT to left foot;   Discussed Plan w/ Dr. Bhatia    Podiatry

## 2021-03-08 NOTE — ED PROVIDER NOTE - OBJECTIVE STATEMENT
71 y.o M w/ pmhx DM, PVD s/p stents, HTN, HLD, sent by Dr. Bhatia podiatry for admission for osteo of L 2nd toe and amputation. Pt otherwise in his normal state of health. No fever, no n/v. Pt ambulating.

## 2021-03-08 NOTE — H&P ADULT - NSHPPHYSICALEXAM_GEN_ALL_CORE
CONSTITUTIONAL: No acute distress, well-developed, well-groomed, AAOx3  HEAD: Atraumatic, normocephalic  EYES: EOM intact, PERRLA, conjunctiva and sclera clear  ENT: Supple, no masses, no thyromegaly, no bruits, no JVD; moist mucous membranes  PULMONARY: Clear to auscultation bilaterally; no wheezes, rales, or rhonchi  CARDIOVASCULAR: Regular rate and rhythm; no murmurs, rubs, or gallops  GASTROINTESTINAL: Soft, non-tender, non-distended; bowel sounds present  MUSCULOSKELETAL: diminished peripheral pulses; no clubbing, no cyanosis, no edema  NEUROLOGY: decreased pin-prick sensation in bilateral lower extremities  SKIN: gangrene of 2nd left toe, amputations of multiple toes bilaterally Vital Signs Last 24 Hrs  T(C): 36.2 (08 Mar 2021 19:27), Max: 36.8 (08 Mar 2021 13:57)  T(F): 97.1 (08 Mar 2021 19:27), Max: 98.3 (08 Mar 2021 13:57)  HR: 75 (08 Mar 2021 19:27) (75 - 92)  BP: 191/86 (08 Mar 2021 19:27) (191/86 - 194/88)  BP(mean): --  RR: 18 (08 Mar 2021 19:27) (18 - 20)  SpO2: 98% (08 Mar 2021 19:27) (98% - 99%)    CONSTITUTIONAL: No acute distress, well-developed, well-groomed, AAOx3  HEAD: Atraumatic, normocephalic  EYES: EOM intact, PERRLA, conjunctiva and sclera clear  ENT: Supple, no masses, no thyromegaly, no bruits, no JVD; moist mucous membranes  PULMONARY: Clear to auscultation bilaterally; no wheezes, rales, or rhonchi  CARDIOVASCULAR: Regular rate and rhythm; no murmurs, rubs, or gallops  GASTROINTESTINAL: Soft, non-tender, non-distended; bowel sounds present  MUSCULOSKELETAL: diminished peripheral pulses; no clubbing, no cyanosis, no edema  NEUROLOGY: decreased pin-prick sensation in bilateral lower extremities  SKIN: gangrene of 2nd left toe, amputations of multiple toes bilaterally

## 2021-03-08 NOTE — H&P ADULT - NSHPSOCIALHISTORY_GEN_ALL_CORE
Marital Status:   Living Situation: lives at home with wife  Occupation: retired, form hospital   Tobacco Use: denies  Alcohol Use: denies  Drug Use: denies  Sexual History: declined to answer  Functional Status: fully functional in all ADLs and IADLs

## 2021-03-08 NOTE — ED PROVIDER NOTE - PHYSICAL EXAMINATION
CONSTITUTIONAL: well-appearing, in NAD  SKIN: Warm dry, normal skin turgor  HEAD: NCAT  EYES: EOMI, PERRLA, no scleral icterus, conjunctiva pink  ENT: normal pharynx with no erythema or exudates  NECK: Supple; non tender. Full ROM.  CARD: RRR, no murmurs.  RESP: clear to ausculation b/l. No crackles or wheezing.  ABD: soft, non-tender, non-distended, no rebound or guarding.  EXT: Full ROM, tenderness of L 2nd toe with overlying ulcer, amputation of L 1st toe. Warm extremity.   NEURO: normal motor. normal sensory. Normal gait.  PSYCH: Cooperative, appropriate.

## 2021-03-08 NOTE — ED PROVIDER NOTE - NS ED ROS FT
Constitutional:  (-) fever, (-) chills, (-) lethargy  Eyes:  (-) eye pain (-) visual changes  ENMT: (-) nasal discharge, (-) sore throat. (-) neck pain or stiffness  Cardiac: (-) chest pain (-) palpitations  Respiratory:  (-) cough (-) respiratory distress.   GI:  (-) nausea (-) vomiting (-) diarrhea (-) abdominal pain.  :  (-) dysuria (-) frequency (-) burning.  MS:  (-) back pain (-) joint pain.  Neuro:  (-) headache (-) numbness (-) tingling (-) focal weakness  Skin:  (+) ulcer  Except as documented in the HPI,  all other systems are negative

## 2021-03-09 ENCOUNTER — TRANSCRIPTION ENCOUNTER (OUTPATIENT)
Age: 71
End: 2021-03-09

## 2021-03-09 LAB
A1C WITH ESTIMATED AVERAGE GLUCOSE RESULT: 10.3 % — HIGH (ref 4–5.6)
ANION GAP SERPL CALC-SCNC: 13 MMOL/L — SIGNIFICANT CHANGE UP (ref 7–14)
BASOPHILS # BLD AUTO: 0.03 K/UL — SIGNIFICANT CHANGE UP (ref 0–0.2)
BASOPHILS NFR BLD AUTO: 0.3 % — SIGNIFICANT CHANGE UP (ref 0–1)
BUN SERPL-MCNC: 23 MG/DL — HIGH (ref 10–20)
CALCIUM SERPL-MCNC: 9 MG/DL — SIGNIFICANT CHANGE UP (ref 8.5–10.1)
CHLORIDE SERPL-SCNC: 102 MMOL/L — SIGNIFICANT CHANGE UP (ref 98–110)
CO2 SERPL-SCNC: 22 MMOL/L — SIGNIFICANT CHANGE UP (ref 17–32)
CREAT SERPL-MCNC: 1.2 MG/DL — SIGNIFICANT CHANGE UP (ref 0.7–1.5)
EOSINOPHIL # BLD AUTO: 0.12 K/UL — SIGNIFICANT CHANGE UP (ref 0–0.7)
EOSINOPHIL NFR BLD AUTO: 1.3 % — SIGNIFICANT CHANGE UP (ref 0–8)
ESTIMATED AVERAGE GLUCOSE: 249 MG/DL — HIGH (ref 68–114)
GLUCOSE BLDC GLUCOMTR-MCNC: 189 MG/DL — HIGH (ref 70–99)
GLUCOSE BLDC GLUCOMTR-MCNC: 199 MG/DL — HIGH (ref 70–99)
GLUCOSE BLDC GLUCOMTR-MCNC: 264 MG/DL — HIGH (ref 70–99)
GLUCOSE BLDC GLUCOMTR-MCNC: 75 MG/DL — SIGNIFICANT CHANGE UP (ref 70–99)
GLUCOSE SERPL-MCNC: 220 MG/DL — HIGH (ref 70–99)
HCT VFR BLD CALC: 40.6 % — LOW (ref 42–52)
HGB BLD-MCNC: 13.1 G/DL — LOW (ref 14–18)
IMM GRANULOCYTES NFR BLD AUTO: 0.5 % — HIGH (ref 0.1–0.3)
LYMPHOCYTES # BLD AUTO: 0.79 K/UL — LOW (ref 1.2–3.4)
LYMPHOCYTES # BLD AUTO: 8.5 % — LOW (ref 20.5–51.1)
MAGNESIUM SERPL-MCNC: 2.3 MG/DL — SIGNIFICANT CHANGE UP (ref 1.8–2.4)
MCHC RBC-ENTMCNC: 29.2 PG — SIGNIFICANT CHANGE UP (ref 27–31)
MCHC RBC-ENTMCNC: 32.3 G/DL — SIGNIFICANT CHANGE UP (ref 32–37)
MCV RBC AUTO: 90.6 FL — SIGNIFICANT CHANGE UP (ref 80–94)
MONOCYTES # BLD AUTO: 0.76 K/UL — HIGH (ref 0.1–0.6)
MONOCYTES NFR BLD AUTO: 8.2 % — SIGNIFICANT CHANGE UP (ref 1.7–9.3)
MRSA PCR RESULT.: NEGATIVE — SIGNIFICANT CHANGE UP
NEUTROPHILS # BLD AUTO: 7.52 K/UL — HIGH (ref 1.4–6.5)
NEUTROPHILS NFR BLD AUTO: 81.2 % — HIGH (ref 42.2–75.2)
NRBC # BLD: 0 /100 WBCS — SIGNIFICANT CHANGE UP (ref 0–0)
PLATELET # BLD AUTO: 158 K/UL — SIGNIFICANT CHANGE UP (ref 130–400)
POTASSIUM SERPL-MCNC: 4.6 MMOL/L — SIGNIFICANT CHANGE UP (ref 3.5–5)
POTASSIUM SERPL-SCNC: 4.6 MMOL/L — SIGNIFICANT CHANGE UP (ref 3.5–5)
RBC # BLD: 4.48 M/UL — LOW (ref 4.7–6.1)
RBC # FLD: 13.2 % — SIGNIFICANT CHANGE UP (ref 11.5–14.5)
SODIUM SERPL-SCNC: 137 MMOL/L — SIGNIFICANT CHANGE UP (ref 135–146)
WBC # BLD: 9.27 K/UL — SIGNIFICANT CHANGE UP (ref 4.8–10.8)
WBC # FLD AUTO: 9.27 K/UL — SIGNIFICANT CHANGE UP (ref 4.8–10.8)

## 2021-03-09 PROCEDURE — 99232 SBSQ HOSP IP/OBS MODERATE 35: CPT

## 2021-03-09 PROCEDURE — 93925 LOWER EXTREMITY STUDY: CPT | Mod: 26

## 2021-03-09 RX ORDER — PANTOPRAZOLE SODIUM 20 MG/1
40 TABLET, DELAYED RELEASE ORAL
Refills: 0 | Status: DISCONTINUED | OUTPATIENT
Start: 2021-03-09 | End: 2021-03-10

## 2021-03-09 RX ORDER — LACTOBACILLUS ACIDOPHILUS 100MM CELL
1 CAPSULE ORAL DAILY
Refills: 0 | Status: DISCONTINUED | OUTPATIENT
Start: 2021-03-09 | End: 2021-03-10

## 2021-03-09 RX ADMIN — Medication 250 MILLIGRAM(S): at 17:31

## 2021-03-09 RX ADMIN — Medication 81 MILLIGRAM(S): at 11:32

## 2021-03-09 RX ADMIN — LISINOPRIL 40 MILLIGRAM(S): 2.5 TABLET ORAL at 06:10

## 2021-03-09 RX ADMIN — Medication 100 MILLIGRAM(S): at 06:30

## 2021-03-09 RX ADMIN — ENOXAPARIN SODIUM 40 MILLIGRAM(S): 100 INJECTION SUBCUTANEOUS at 11:32

## 2021-03-09 RX ADMIN — CEFEPIME 100 MILLIGRAM(S): 1 INJECTION, POWDER, FOR SOLUTION INTRAMUSCULAR; INTRAVENOUS at 14:07

## 2021-03-09 RX ADMIN — CEFEPIME 100 MILLIGRAM(S): 1 INJECTION, POWDER, FOR SOLUTION INTRAMUSCULAR; INTRAVENOUS at 21:25

## 2021-03-09 RX ADMIN — ATORVASTATIN CALCIUM 40 MILLIGRAM(S): 80 TABLET, FILM COATED ORAL at 21:30

## 2021-03-09 RX ADMIN — CEFEPIME 100 MILLIGRAM(S): 1 INJECTION, POWDER, FOR SOLUTION INTRAMUSCULAR; INTRAVENOUS at 05:01

## 2021-03-09 RX ADMIN — Medication 1 TABLET(S): at 11:32

## 2021-03-09 RX ADMIN — Medication 2: at 17:30

## 2021-03-09 RX ADMIN — Medication 100 MILLIGRAM(S): at 14:08

## 2021-03-09 RX ADMIN — INSULIN GLARGINE 33 UNIT(S): 100 INJECTION, SOLUTION SUBCUTANEOUS at 21:42

## 2021-03-09 RX ADMIN — Medication 8 UNIT(S): at 08:00

## 2021-03-09 RX ADMIN — Medication 8 UNIT(S): at 17:31

## 2021-03-09 RX ADMIN — Medication 100 MILLIGRAM(S): at 21:25

## 2021-03-09 RX ADMIN — Medication 250 MILLIGRAM(S): at 05:24

## 2021-03-09 RX ADMIN — Medication 60 MILLIGRAM(S): at 06:10

## 2021-03-09 RX ADMIN — Medication 6: at 07:59

## 2021-03-09 NOTE — CONSULT NOTE ADULT - ATTENDING COMMENTS
osteomyelitis of the left 2nd toe, will plan for OR this week
I have personally examined the patient and reviewed the documentation above.  Corrections and edits were made wherever needed.

## 2021-03-09 NOTE — CONSULT NOTE ADULT - ASSESSMENT
71 M with PMH of insulin-dependent diabetes mellitus complicated by diabetic foot ulcers s/p amputation of half of left 1st toe, entire right 1st toe, right 3rd toe, right 4th toe, peripheral vascular disease, hypertension, hyperlipidemia, GERD, and depression who was instructed to come to the ED by his podiatrist Marko Bhatia for osteomyelitis of the left 2nd toe.    #Diabetic foot ulcer  #Osteomyelitis  #Left 2nd toe gangrene  - Sepsis not present; afebrile, no white count  - MRI foot on 3/3:   1. Status post partial second toe amputation with recurrent osteomyelitis at the middle phalanx and proximal phalanx with PIP septic arthritis  2. Nonspecific small second MTP joint effusion without erosions, likely represents nonspecific synovitis  3. Status post great toe partial amputation with minimal osteitis, nonspecific. Correlate clinically for developing ulcer  - Had multiple debridements and amputations in the past, wound infections with multiple organisms (enterobacter, stenotrophomonas pseudomonas, group B strep, corynebacterium, E. coli)  - Planned for excisional debridement of soft tissue and bone with possible 2nd digit amputation of left foot on Thursday 3/11  - Received 1 time dose of Zosyn in ED, currently on vancomycin 1 g q12, metronidazole 500 mg q8, cefepime 2 g q8  - f/u MRSA nares (no MRSA in the past)  - f/u vanc trough prior to 4th dose  - f/u BCx  - f/u podiatry, surgical cultures      ------INCOMPLETE------- 71 M with PMH of insulin-dependent diabetes mellitus complicated by diabetic foot ulcers s/p amputation of half of left 1st toe, entire right 1st toe, right 3rd toe, right 4th toe, peripheral vascular disease, hypertension, hyperlipidemia, GERD, and depression who was instructed to come to the ED by his podiatrist Marko Bhatia for osteomyelitis of the left 2nd toe.    #Diabetic foot ulcer  #Osteomyelitis?  #Left 2nd toe gangrene  - Sepsis not present; afebrile, no white count  - MRI foot on 3/3:   1. Status post partial second toe amputation with recurrent osteomyelitis at the middle phalanx and proximal phalanx with PIP septic arthritis  2. Nonspecific small second MTP joint effusion without erosions, likely represents nonspecific synovitis  3. Status post great toe partial amputation with minimal osteitis, nonspecific. Correlate clinically for developing ulcer  - Had multiple debridements and amputations in the past, wound infections with multiple organisms (enterobacter, stenotrophomonas pseudomonas, group B strep, corynebacterium, E. coli)  - No evidence of soft tissue infection on examination  - MRI findings may be due to chronic inflammation  - Planned for excisional debridement of soft tissue and bone with possible 2nd digit amputation of left foot on Thursday 3/11  - If proceeding with surgery, please obtain bone histopathology and cultures  - Can d/c antibiotics  - f/u BCx  - f/u podiatry 71 M with PMH of insulin-dependent diabetes mellitus complicated by diabetic foot ulcers s/p amputation of half of left 1st toe, entire right 1st toe, right 3rd toe, right 4th toe, peripheral vascular disease, hypertension, hyperlipidemia, GERD, and depression who was instructed to come to the ED by his podiatrist Marko Bhatia for osteomyelitis of the left 2nd toe.    #Diabetic foot ulcer  #Osteomyelitis?  #Left 2nd toe gangrene  - Sepsis not present; afebrile, no white count  - MRI foot on 3/3:   1. Status post partial second toe amputation with recurrent osteomyelitis at the middle phalanx and proximal phalanx with PIP septic arthritis  2. Nonspecific small second MTP joint effusion without erosions, likely represents nonspecific synovitis  3. Status post great toe partial amputation with minimal osteitis, nonspecific. Correlate clinically for developing ulcer  - Had multiple debridements and amputations in the past, wound infections with multiple organisms (enterobacter, stenotrophomonas pseudomonas, group B strep, corynebacterium, E. coli)  - No evidence of soft tissue infection on examination  - MRI findings may be due to chronic inflammation  - Planned for excisional debridement of soft tissue and bone with possible 2nd digit amputation of left foot on Thursday 3/11    Recommendations  - If proceeding with surgery, please obtain bone histopathology and cultures  - Can d/c antibiotics  - f/u BCx 71 M with PMH of insulin-dependent diabetes mellitus complicated by diabetic foot ulcers s/p amputation of half of left 1st toe, entire right 1st toe, right 3rd toe, right 4th toe, peripheral vascular disease, hypertension, hyperlipidemia, GERD, and depression who was instructed to come to the ED by his podiatrist Marko Bhatia for osteomyelitis of the left 2nd toe.    #Diabetic foot ulcer  #Osteomyelitis?  #Left 2nd toe gangrene  - Sepsis not present; afebrile, no white count  - MRI foot on 3/3:   1. Status post partial second toe amputation with recurrent osteomyelitis at the middle phalanx and proximal phalanx with PIP septic arthritis  2. Nonspecific small second MTP joint effusion without erosions, likely represents nonspecific synovitis  3. Status post great toe partial amputation with minimal osteitis, nonspecific. Correlate clinically for developing ulcer  - Had multiple debridements and amputations in the past, wound infections with multiple organisms (enterobacter, stenotrophomonas pseudomonas, group B strep, corynebacterium, E. coli)  - No evidence of soft tissue infection on examination  - MRI findings may be due to chronic inflammation  - Planned for excisional debridement of soft tissue and bone with possible 2nd digit amputation of left foot on Thursday 3/11    Recommendations  - If proceeding with surgery, please obtain bone histopathology and cultures  - Can d/c antibiotics  for now  - f/u BCx

## 2021-03-09 NOTE — CHART NOTE - NSCHARTNOTEFT_GEN_A_CORE
Podiatry;    Sx schedule changed from Thursday 3/11 @ 3:00PM to Wednesday 3/10 @ 1:30PM; Left 2nd digit amputation;  Will follow up with Medical clearance;  Request pre-lab optimization and OR stratification prior to sx  NPO @ [facility] Tuesday 3/9;  Please discontinue NPO order for Wednesday 3/10 [facility];    Podiatry

## 2021-03-09 NOTE — CONSULT NOTE ADULT - SUBJECTIVE AND OBJECTIVE BOX
JOSÉ MANUEL PORTER  71y, Male  Allergy: No Known Allergies      CHIEF COMPLAINT: diabetic foot ulcer (09 Mar 2021 11:31)      HPI:  Patient is a 72yo male with PMH of insulin-dependent diabetes mellitus complicated by diabetic foot ulcers s/p amputation of half of left great toe, entire right great toe, right 3rd toe, right 4th toe, peripheral vascular disease, hypertension, hyperlipidemia, GERD, and depression who was instructed to come to the ED by his podiatrist Marko Bhatia for osteomyelitis of the left 2nd toe with possible amputation. The patient is unsure of when his foot ulcer began, but knows that he has been treated for them for quite some time. He denies any other symptoms at this time, including fever, chills, chest pain, palpitations, shortness of breath, or abdominal pain.    In the ED, vital signs were Tmax 98.3F, HR 92, /88, RR 20, SpO2 99% on room air. Patient was given vancomycin 1g IV and Zosyn 3.375mg IV. Patient was evaluated by the podiatry team in the ED.      Infectious Diseases History:  Old Micro Data/Cultures:   Culture - Tissue with Gram Stain (10.30.20 @ 11:53)   - Vancomycin: S 1   - Oxacillin: S <=0.25   - Penicillin: R 2   - RIF- Rifampin: S <=1 Should not be used as monotherapy   - Tetra/Doxy: S <=1   - Trimethoprim/Sulfamethoxazole: S <=0.5/9.5   Gram Stain:   Rare polymorphonuclear leukocytes seen per low power field   No organisms seen per oil power field   - Ampicillin/Sulbactam: S <=8/4   - Cefazolin: S <=4   - Clindamycin: S <=0.25   - Erythromycin: S <=0.25   - Gentamicin: S <=1 Should not be used as monotherapy   Specimen Source: .Tissue None   Culture Results:   Rare Coag Negative Staphylococcus   Organism Identification: Coag Negative Staphylococcus   Organism: Coag Negative Staphylococcus   Method Type: LINDSEY       Historical Values  Culture - Tissue with Gram Stain (10.30.20 @ 11:53)   - Vancomycin: S 1   - Oxacillin: S <=0.25   - Penicillin: R 2   - RIF- Rifampin: S <=1 Should not be used as monotherapy   - Tetra/Doxy: S <=1   - Trimethoprim/Sulfamethoxazole: S <=0.5/9.5   Gram Stain:   Rare polymorphonuclear leukocytes seen per low power field   No organisms seen per oil power field   - Ampicillin/Sulbactam: S <=8/4   - Cefazolin: S <=4   - Clindamycin: S <=0.25   - Erythromycin: S <=0.25   - Gentamicin: S <=1 Should not be used as monotherapy   Specimen Source: .Tissue None   Culture Results:   Rare Coag Negative Staphylococcus   Organism Identification: Coag Negative Staphylococcus   Organism: Coag Negative Staphylococcus   Method Type: LINDSEY   Culture - Tissue with Gram Stain (12.04.19 @ 21:00)   - Trimethoprim/Sulfamethoxazole: S <=2/38   - Tobramycin: S <=2   - Trimethoprim/Sulfamethoxazole: S <=2/38   - Piperacillin/Tazobactam: R >64   - Meropenem: S <=1   - Amikacin: S <=16   Gram Stain:   Numerous Gram Negative Rods seen per oil power field   No polymorphonuclear leukocytes seen per low power field   - Amoxicillin/Clavulanic Acid: R >16/8   - Ampicillin: R >16 These ampicillin results predict results for amoxicillin   - Gentamicin: S <=2   - Ertapenem: I 1   - Imipenem: S <=1   - Levofloxacin: S <=2   - Levofloxacin: S <=2   - Cefazolin: R >16 Enterobacter, Citrobacter, and Serratia may develop resistance during prolonged therapy (3-4 days)   - Ampicillin/Sulbactam: R >16/8 Enterobacter, Citrobacter, and Serratia may develop resistance during prolonged therapy (3-4 days)   - Aztreonam: R >16   - Cefepime: R >16   - Ceftazidime: S 8   - Cefoxitin: R >16   - Ceftriaxone: R >32 Enterobacter, Citrobacter, and Serratia may develop resistance during prolonged therapy   - Ciprofloxacin: S <=1   Specimen Source: .Tissue None   Culture Results:   Numerous Enterobacter cloacae complex   Few Corynebacterium species "Susceptibilities not performed"   Rare Stenotrophomonas maltophilia   Organism Identification: Enterobacter cloacae complex   Stenotrophomonas maltophilia   Organism: Enterobacter cloacae complex   Organism: Stenotrophomonas maltophilia   Method Type: LINDSEY   Method Type: LINDSEY   Culture - Tissue with Gram Stain (08.20.19 @ 09:00)   - Vancomycin: S 2   - Trimethoprim/Sulfamethoxazole: S <=2/38   - Trimethoprim/Sulfamethoxazole: S <=2/38   - Tobramycin: S <=2   - Tobramycin: S <=2   - Meropenem: S <=1   - Meropenem: S <=1   - Tetra/Doxy: R >8   - Piperacillin/Tazobactam: S <=8   - Piperacillin/Tazobactam: S <=8   Gram Stain:   No polymorphonuclear cells seen per low power field   Rare Gram Negative Rods seen per oil power field   - Amikacin: S <=16   - Amikacin: S <=16   - Amoxicillin/Clavulanic Acid: S <=8/4   - Amoxicillin/Clavulanic Acid: S <=8/4   - Ampicillin: R 16 These ampicillin results predict results for amoxicillin   - Ampicillin: S <=8 These ampicillin results predict results for amoxicillin   - Ampicillin: S <=2 Predicts results to ampicillin/sulbactam, amoxacillin-clavulanate and piperacillin-tazobactam.   - Gentamicin: S <=2   - Gentamicin: S <=2   - Ceftriaxone: S <=1 Enterobacter, Citrobacter, and Serratia may develop resistance during prolonged therapy   - Ceftriaxone: S <=1 Enterobacter, Citrobacter, and Serratia may develop resistance during prolonged therapy   - Ertapenem: S <=0.5   - Ertapenem: S <=0.5   - Imipenem: S <=1   - Imipenem: S <=1   - Levofloxacin: S <=2   - Levofloxacin: S <=2   - Aztreonam: S <=4   - Aztreonam: S <=4   - Cefazolin: S <=2 Enterobacter, Citrobacter, and Serratia may develop resistance during prolonged therapy (3-4 days)   - Cefazolin: S <=2 Enterobacter, Citrobacter, and Serratia may develop resistance during prolonged therapy (3-4 days)   - Cefepime: S <=2   - Cefepime: S <=2   - Ampicillin/Sulbactam: S <=4/2 Enterobacter, Citrobacter, and Serratia may develop resistance during prolonged therapy (3-4 days)   - Ampicillin/Sulbactam: S <=4/2 Enterobacter, Citrobacter, and Serratia may develop resistance during prolonged therapy (3-4 days)   - Cefoxitin: S <=8   - Cefoxitin: S <=8   - Ciprofloxacin: S <=1   - Ciprofloxacin: S <=1   Specimen Source: .Tissue None   Culture Results:   Few Enterococcus faecalis   Few Klebsiella variicola   Rare Escherichia coli   Organism Identification: Enterococcus faecalis   Klebsiella variicola   Escherichia coli   Organism: Enterococcus faecalis   Organism: Klebsiella variicola   Organism: Escherichia coli       FAMILY HISTORY:  Family history of ischemic heart disease (IHD) (Father)    Family history of lung cancer (Mother)      PAST MEDICAL & SURGICAL HISTORY:  Diabetes    GERD (gastroesophageal reflux disease)    Depression    Diabetic foot ulcer    Dyslipidemia    HTN (hypertension)    DM (diabetes mellitus)  12/27/18 hgb aic  11.2    History of amputation of toe  LT -partial 1st toe    Toe amputation status, right  (2008)        SOCIAL HISTORY  Social History:  Marital Status:   Living Situation: lives at home with wife  Occupation: retired, form hospital   Tobacco Use: denies  Alcohol Use: denies  Drug Use: denies  Sexual History: declined to answer  Functional Status: fully functional in all ADLs and IADLs (08 Mar 2021 20:17)      Recent Travel:  Other Exposures:     ROS  General: Denies rigors, nightsweats  CV: Denies CP, palpitations  PULM: Denies wheezing, hemoptysis  : Denies discharge, hematuria  MSK: b/l LE pain  NEURO: Denies paresthesias, weakness    VITALS:  T(F): 97.3, Max: 98.3 (03-08-21 @ 13:57)  HR: 85  BP: 122/57  RR: 18Vital Signs Last 24 Hrs  T(C): 36.3 (09 Mar 2021 03:58), Max: 36.8 (08 Mar 2021 13:57)  T(F): 97.3 (09 Mar 2021 03:58), Max: 98.3 (08 Mar 2021 13:57)  HR: 85 (09 Mar 2021 03:58) (75 - 92)  BP: 122/57 (09 Mar 2021 03:58) (122/57 - 194/88)  BP(mean): --  RR: 18 (09 Mar 2021 03:58) (18 - 20)  SpO2: 96% (09 Mar 2021 08:08) (96% - 99%)    PHYSICAL EXAM:  Gen: NAD, appears comfortable  HEENT: Normocephalic, atraumatic  CV: Regular rate & regular rhythm  Lungs: CTAB  Abdomen: Soft, BS present  Ext: b/l feet wrapped  Neuro: non focal, awake  Skin: no rash, no lesions  Lines: no phlebitis    TESTS & MEASUREMENTS:                        13.1   9.27  )-----------( 158      ( 09 Mar 2021 06:16 )             40.6     03-09    137  |  102  |  23<H>  ----------------------------<  220<H>  4.6   |  22  |  1.2    Ca    9.0      09 Mar 2021 06:16  Mg     2.3     03-09    TPro  7.1  /  Alb  4.6  /  TBili  0.4  /  DBili  x   /  AST  24  /  ALT  29  /  AlkPhos  99  03-08    eGFR if Non African American: 60 mL/min/1.73M2 (03-09-21 @ 06:16)  eGFR if : 69 mL/min/1.73M2 (03-09-21 @ 06:16)  eGFR if Non African American: 46 mL/min/1.73M2 (03-08-21 @ 14:25)  eGFR if African American: 53 mL/min/1.73M2 (03-08-21 @ 14:25)    LIVER FUNCTIONS - ( 08 Mar 2021 14:25 )  Alb: 4.6 g/dL / Pro: 7.1 g/dL / ALK PHOS: 99 U/L / ALT: 29 U/L / AST: 24 U/L / GGT: x                     INFECTIOUS DISEASES TESTING      RADIOLOGY & ADDITIONAL TESTS:  I have personally reviewed the last available Chest xray  CXR  Xray Chest 1 View- PORTABLE-Urgent:   EXAM:  XR CHEST PORTABLE URGENT 1V            PROCEDURE DATE:  03/08/2021            INTERPRETATION:  Clinical History / Reason for exam: Fever, sepsis.    Comparison : Chest radiograph December 3, 2019.    Technique/Positioning: Frontal portable.    Findings:    Support devices: None.    Cardiac/mediastinum/hilum: Unremarkable.    Lung parenchyma/Pleura: Within normal limits.    Skeleton/soft tissues: Unremarkable.    Impression:    No radiographic evidence of acute cardiopulmonary disease.                  MP HAYES MD; Attending Interventional Radiologist  This document has been electronically signed. Mar  8 2021  4:11PM (03-08-21 @ 16:02)      CT      CARDIOLOGY TESTING  12 Lead ECG:   Ventricular Rate 81 BPM    Atrial Rate 81 BPM    P-R Interval 138 ms    QRS Duration 72 ms    Q-T Interval 376 ms    QTC Calculation(Bazett) 436 ms    P Axis 68 degrees    R Axis 13 degrees    T Axis 18 degrees    Diagnosis Line Normal sinus rhythm  Inferior infarct , age undetermined  Abnormal ECG    Confirmed by DAVE COWAN MD ([id number]) on 3/8/2021 4:26:53 PM (03-08-21 @ 14:41)      All available historical records have been reviewed    MEDICATIONS  aspirin enteric coated 81  atorvastatin 40  cefepime   IVPB 2000  enoxaparin Injectable 40  insulin glargine Injectable (LANTUS) 33  insulin lispro (ADMELOG) corrective regimen sliding scale   insulin lispro Injectable (ADMELOG) 8  lactobacillus acidophilus 1  lisinopril 40  metroNIDAZOLE  IVPB 500  NIFEdipine XL 60  pantoprazole    Tablet 40  vancomycin  IVPB 1000      ANTIBIOTICS:  cefepime   IVPB 2000 milliGRAM(s) IV Intermittent every 8 hours  metroNIDAZOLE  IVPB 500 milliGRAM(s) IV Intermittent every 8 hours  vancomycin  IVPB 1000 milliGRAM(s) IV Intermittent every 12 hours      All available historical data has been reviewed     JOSÉ MANUEL PORTER  71y, Male  Allergy: No Known Allergies      CHIEF COMPLAINT: diabetic foot ulcer (09 Mar 2021 11:31)      HPI:  Patient is a 70yo male with PMH of insulin-dependent diabetes mellitus complicated by diabetic foot ulcers s/p amputation of half of left great toe, entire right great toe, right 3rd toe, right 4th toe, peripheral vascular disease, hypertension, hyperlipidemia, GERD, and depression who was instructed to come to the ED by his podiatrist Marko Bhatia for osteomyelitis of the left 2nd toe with possible amputation. The patient is unsure of when his foot ulcer began, but knows that he has been treated for them for quite some time. He denies any other symptoms at this time, including fever, chills, chest pain, palpitations, shortness of breath, or abdominal pain.    In the ED, vital signs were Tmax 98.3F, HR 92, /88, RR 20, SpO2 99% on room air. Patient was given vancomycin 1g IV and Zosyn 3.375mg IV. Patient was evaluated by the podiatry team in the ED.      Infectious Diseases History:  Old Micro Data/Cultures:   Culture - Tissue with Gram Stain (10.30.20 @ 11:53)   - Vancomycin: S 1   - Oxacillin: S <=0.25   - Penicillin: R 2   - RIF- Rifampin: S <=1 Should not be used as monotherapy   - Tetra/Doxy: S <=1   - Trimethoprim/Sulfamethoxazole: S <=0.5/9.5   Gram Stain:   Rare polymorphonuclear leukocytes seen per low power field   No organisms seen per oil power field   - Ampicillin/Sulbactam: S <=8/4   - Cefazolin: S <=4   - Clindamycin: S <=0.25   - Erythromycin: S <=0.25   - Gentamicin: S <=1 Should not be used as monotherapy   Specimen Source: .Tissue None   Culture Results:   Rare Coag Negative Staphylococcus   Organism Identification: Coag Negative Staphylococcus   Organism: Coag Negative Staphylococcus   Method Type: LINDSEY       Historical Values  Culture - Tissue with Gram Stain (10.30.20 @ 11:53)   - Vancomycin: S 1   - Oxacillin: S <=0.25   - Penicillin: R 2   - RIF- Rifampin: S <=1 Should not be used as monotherapy   - Tetra/Doxy: S <=1   - Trimethoprim/Sulfamethoxazole: S <=0.5/9.5   Gram Stain:   Rare polymorphonuclear leukocytes seen per low power field   No organisms seen per oil power field   - Ampicillin/Sulbactam: S <=8/4   - Cefazolin: S <=4   - Clindamycin: S <=0.25   - Erythromycin: S <=0.25   - Gentamicin: S <=1 Should not be used as monotherapy   Specimen Source: .Tissue None   Culture Results:   Rare Coag Negative Staphylococcus   Organism Identification: Coag Negative Staphylococcus   Organism: Coag Negative Staphylococcus   Method Type: LINDSEY   Culture - Tissue with Gram Stain (12.04.19 @ 21:00)   - Trimethoprim/Sulfamethoxazole: S <=2/38   - Tobramycin: S <=2   - Trimethoprim/Sulfamethoxazole: S <=2/38   - Piperacillin/Tazobactam: R >64   - Meropenem: S <=1   - Amikacin: S <=16   Gram Stain:   Numerous Gram Negative Rods seen per oil power field   No polymorphonuclear leukocytes seen per low power field   - Amoxicillin/Clavulanic Acid: R >16/8   - Ampicillin: R >16 These ampicillin results predict results for amoxicillin   - Gentamicin: S <=2   - Ertapenem: I 1   - Imipenem: S <=1   - Levofloxacin: S <=2   - Levofloxacin: S <=2   - Cefazolin: R >16 Enterobacter, Citrobacter, and Serratia may develop resistance during prolonged therapy (3-4 days)   - Ampicillin/Sulbactam: R >16/8 Enterobacter, Citrobacter, and Serratia may develop resistance during prolonged therapy (3-4 days)   - Aztreonam: R >16   - Cefepime: R >16   - Ceftazidime: S 8   - Cefoxitin: R >16   - Ceftriaxone: R >32 Enterobacter, Citrobacter, and Serratia may develop resistance during prolonged therapy   - Ciprofloxacin: S <=1   Specimen Source: .Tissue None   Culture Results:   Numerous Enterobacter cloacae complex   Few Corynebacterium species "Susceptibilities not performed"   Rare Stenotrophomonas maltophilia   Organism Identification: Enterobacter cloacae complex   Stenotrophomonas maltophilia   Organism: Enterobacter cloacae complex   Organism: Stenotrophomonas maltophilia   Method Type: LINDSEY   Method Type: LINDSEY   Culture - Tissue with Gram Stain (08.20.19 @ 09:00)   - Vancomycin: S 2   - Trimethoprim/Sulfamethoxazole: S <=2/38   - Trimethoprim/Sulfamethoxazole: S <=2/38   - Tobramycin: S <=2   - Tobramycin: S <=2   - Meropenem: S <=1   - Meropenem: S <=1   - Tetra/Doxy: R >8   - Piperacillin/Tazobactam: S <=8   - Piperacillin/Tazobactam: S <=8   Gram Stain:   No polymorphonuclear cells seen per low power field   Rare Gram Negative Rods seen per oil power field   - Amikacin: S <=16   - Amikacin: S <=16   - Amoxicillin/Clavulanic Acid: S <=8/4   - Amoxicillin/Clavulanic Acid: S <=8/4   - Ampicillin: R 16 These ampicillin results predict results for amoxicillin   - Ampicillin: S <=8 These ampicillin results predict results for amoxicillin   - Ampicillin: S <=2 Predicts results to ampicillin/sulbactam, amoxacillin-clavulanate and piperacillin-tazobactam.   - Gentamicin: S <=2   - Gentamicin: S <=2   - Ceftriaxone: S <=1 Enterobacter, Citrobacter, and Serratia may develop resistance during prolonged therapy   - Ceftriaxone: S <=1 Enterobacter, Citrobacter, and Serratia may develop resistance during prolonged therapy   - Ertapenem: S <=0.5   - Ertapenem: S <=0.5   - Imipenem: S <=1   - Imipenem: S <=1   - Levofloxacin: S <=2   - Levofloxacin: S <=2   - Aztreonam: S <=4   - Aztreonam: S <=4   - Cefazolin: S <=2 Enterobacter, Citrobacter, and Serratia may develop resistance during prolonged therapy (3-4 days)   - Cefazolin: S <=2 Enterobacter, Citrobacter, and Serratia may develop resistance during prolonged therapy (3-4 days)   - Cefepime: S <=2   - Cefepime: S <=2   - Ampicillin/Sulbactam: S <=4/2 Enterobacter, Citrobacter, and Serratia may develop resistance during prolonged therapy (3-4 days)   - Ampicillin/Sulbactam: S <=4/2 Enterobacter, Citrobacter, and Serratia may develop resistance during prolonged therapy (3-4 days)   - Cefoxitin: S <=8   - Cefoxitin: S <=8   - Ciprofloxacin: S <=1   - Ciprofloxacin: S <=1   Specimen Source: .Tissue None   Culture Results:   Few Enterococcus faecalis   Few Klebsiella variicola   Rare Escherichia coli   Organism Identification: Enterococcus faecalis   Klebsiella variicola   Escherichia coli   Organism: Enterococcus faecalis   Organism: Klebsiella variicola   Organism: Escherichia coli       FAMILY HISTORY:  Family history of ischemic heart disease (IHD) (Father)    Family history of lung cancer (Mother)      PAST MEDICAL & SURGICAL HISTORY:  Diabetes    GERD (gastroesophageal reflux disease)    Depression    Diabetic foot ulcer    Dyslipidemia    HTN (hypertension)    DM (diabetes mellitus)  12/27/18 hgb aic  11.2    History of amputation of toe  LT -partial 1st toe    Toe amputation status, right  (2008)        SOCIAL HISTORY  Social History:  Marital Status:   Living Situation: lives at home with wife  Occupation: retired, form hospital   Tobacco Use: denies  Alcohol Use: denies  Drug Use: denies  Sexual History: declined to answer  Functional Status: fully functional in all ADLs and IADLs (08 Mar 2021 20:17)      Recent Travel:  Other Exposures:     ROS  General: Denies rigors, nightsweats  CV: Denies CP, palpitations  PULM: Denies wheezing, hemoptysis  : Denies discharge, hematuria  MSK: b/l LE pain  NEURO: Denies paresthesias, weakness    VITALS:  T(F): 97.3, Max: 98.3 (03-08-21 @ 13:57)  HR: 85  BP: 122/57  RR: 18Vital Signs Last 24 Hrs  T(C): 36.3 (09 Mar 2021 03:58), Max: 36.8 (08 Mar 2021 13:57)  T(F): 97.3 (09 Mar 2021 03:58), Max: 98.3 (08 Mar 2021 13:57)  HR: 85 (09 Mar 2021 03:58) (75 - 92)  BP: 122/57 (09 Mar 2021 03:58) (122/57 - 194/88)  BP(mean): --  RR: 18 (09 Mar 2021 03:58) (18 - 20)  SpO2: 96% (09 Mar 2021 08:08) (96% - 99%)    PHYSICAL EXAM:  Gen: NAD, appears comfortable  HEENT: Normocephalic, atraumatic  CV: Regular rate & regular rhythm  Lungs: CTAB  Abdomen: Soft, BS present  Ext: Lt 2nd toe with distal phalanx amputated, no drainage. Multiple toes with phalanx amputations  Neuro: non focal, awake  Skin: no rash, no lesions  Lines: no phlebitis    TESTS & MEASUREMENTS:                        13.1   9.27  )-----------( 158      ( 09 Mar 2021 06:16 )             40.6     03-09    137  |  102  |  23<H>  ----------------------------<  220<H>  4.6   |  22  |  1.2    Ca    9.0      09 Mar 2021 06:16  Mg     2.3     03-09    TPro  7.1  /  Alb  4.6  /  TBili  0.4  /  DBili  x   /  AST  24  /  ALT  29  /  AlkPhos  99  03-08    eGFR if Non African American: 60 mL/min/1.73M2 (03-09-21 @ 06:16)  eGFR if : 69 mL/min/1.73M2 (03-09-21 @ 06:16)  eGFR if Non African American: 46 mL/min/1.73M2 (03-08-21 @ 14:25)  eGFR if African American: 53 mL/min/1.73M2 (03-08-21 @ 14:25)    LIVER FUNCTIONS - ( 08 Mar 2021 14:25 )  Alb: 4.6 g/dL / Pro: 7.1 g/dL / ALK PHOS: 99 U/L / ALT: 29 U/L / AST: 24 U/L / GGT: x                     INFECTIOUS DISEASES TESTING      RADIOLOGY & ADDITIONAL TESTS:  I have personally reviewed the last available Chest xray  CXR  Xray Chest 1 View- PORTABLE-Urgent:   EXAM:  XR CHEST PORTABLE URGENT 1V            PROCEDURE DATE:  03/08/2021            INTERPRETATION:  Clinical History / Reason for exam: Fever, sepsis.    Comparison : Chest radiograph December 3, 2019.    Technique/Positioning: Frontal portable.    Findings:    Support devices: None.    Cardiac/mediastinum/hilum: Unremarkable.    Lung parenchyma/Pleura: Within normal limits.    Skeleton/soft tissues: Unremarkable.    Impression:    No radiographic evidence of acute cardiopulmonary disease.                  MP HAYES MD; Attending Interventional Radiologist  This document has been electronically signed. Mar  8 2021  4:11PM (03-08-21 @ 16:02)      CT      CARDIOLOGY TESTING  12 Lead ECG:   Ventricular Rate 81 BPM    Atrial Rate 81 BPM    P-R Interval 138 ms    QRS Duration 72 ms    Q-T Interval 376 ms    QTC Calculation(Bazett) 436 ms    P Axis 68 degrees    R Axis 13 degrees    T Axis 18 degrees    Diagnosis Line Normal sinus rhythm  Inferior infarct , age undetermined  Abnormal ECG    Confirmed by DAVE COWAN MD ([id number]) on 3/8/2021 4:26:53 PM (03-08-21 @ 14:41)      All available historical records have been reviewed    MEDICATIONS  aspirin enteric coated 81  atorvastatin 40  cefepime   IVPB 2000  enoxaparin Injectable 40  insulin glargine Injectable (LANTUS) 33  insulin lispro (ADMELOG) corrective regimen sliding scale   insulin lispro Injectable (ADMELOG) 8  lactobacillus acidophilus 1  lisinopril 40  metroNIDAZOLE  IVPB 500  NIFEdipine XL 60  pantoprazole    Tablet 40  vancomycin  IVPB 1000      ANTIBIOTICS:  cefepime   IVPB 2000 milliGRAM(s) IV Intermittent every 8 hours  metroNIDAZOLE  IVPB 500 milliGRAM(s) IV Intermittent every 8 hours  vancomycin  IVPB 1000 milliGRAM(s) IV Intermittent every 12 hours      All available historical data has been reviewed

## 2021-03-09 NOTE — DISCHARGE NOTE NURSING/CASE MANAGEMENT/SOCIAL WORK - PATIENT PORTAL LINK FT
You can access the FollowMyHealth Patient Portal offered by [facility] by registering at the following website: [email or web address]. By joining [facility]’s FollowMyHealth portal, you will also be able to view your health information using other applications (apps) compatible with our system.

## 2021-03-10 ENCOUNTER — RESULT REVIEW (OUTPATIENT)
Age: 71
End: 2021-03-10

## 2021-03-10 LAB
ALBUMIN SERPL ELPH-MCNC: 3.7 G/DL — SIGNIFICANT CHANGE UP (ref 3.5–5.2)
ALP SERPL-CCNC: 72 U/L — SIGNIFICANT CHANGE UP (ref 30–115)
ALT FLD-CCNC: 20 U/L — SIGNIFICANT CHANGE UP (ref 0–41)
ANION GAP SERPL CALC-SCNC: 9 MMOL/L — SIGNIFICANT CHANGE UP (ref 7–14)
AST SERPL-CCNC: 21 U/L — SIGNIFICANT CHANGE UP (ref 0–41)
BASOPHILS # BLD AUTO: 0.01 K/UL — SIGNIFICANT CHANGE UP (ref 0–0.2)
BASOPHILS NFR BLD AUTO: 0.2 % — SIGNIFICANT CHANGE UP (ref 0–1)
BILIRUB SERPL-MCNC: 0.5 MG/DL — SIGNIFICANT CHANGE UP (ref 0.2–1.2)
BUN SERPL-MCNC: 27 MG/DL — HIGH (ref 10–20)
CALCIUM SERPL-MCNC: 8.8 MG/DL — SIGNIFICANT CHANGE UP (ref 8.5–10.1)
CHLORIDE SERPL-SCNC: 108 MMOL/L — SIGNIFICANT CHANGE UP (ref 98–110)
CO2 SERPL-SCNC: 25 MMOL/L — SIGNIFICANT CHANGE UP (ref 17–32)
CREAT SERPL-MCNC: 1.1 MG/DL — SIGNIFICANT CHANGE UP (ref 0.7–1.5)
EOSINOPHIL # BLD AUTO: 0.1 K/UL — SIGNIFICANT CHANGE UP (ref 0–0.7)
EOSINOPHIL NFR BLD AUTO: 2 % — SIGNIFICANT CHANGE UP (ref 0–8)
GLUCOSE BLDC GLUCOMTR-MCNC: 100 MG/DL — HIGH (ref 70–99)
GLUCOSE BLDC GLUCOMTR-MCNC: 108 MG/DL — HIGH (ref 70–99)
GLUCOSE BLDC GLUCOMTR-MCNC: 115 MG/DL — HIGH (ref 70–99)
GLUCOSE BLDC GLUCOMTR-MCNC: 167 MG/DL — HIGH (ref 70–99)
GLUCOSE SERPL-MCNC: 82 MG/DL — SIGNIFICANT CHANGE UP (ref 70–99)
HCT VFR BLD CALC: 36.2 % — LOW (ref 42–52)
HGB BLD-MCNC: 11.7 G/DL — LOW (ref 14–18)
IMM GRANULOCYTES NFR BLD AUTO: 0.6 % — HIGH (ref 0.1–0.3)
LYMPHOCYTES # BLD AUTO: 0.9 K/UL — LOW (ref 1.2–3.4)
LYMPHOCYTES # BLD AUTO: 18.4 % — LOW (ref 20.5–51.1)
MAGNESIUM SERPL-MCNC: 2.3 MG/DL — SIGNIFICANT CHANGE UP (ref 1.8–2.4)
MCHC RBC-ENTMCNC: 29.1 PG — SIGNIFICANT CHANGE UP (ref 27–31)
MCHC RBC-ENTMCNC: 32.3 G/DL — SIGNIFICANT CHANGE UP (ref 32–37)
MCV RBC AUTO: 90 FL — SIGNIFICANT CHANGE UP (ref 80–94)
MONOCYTES # BLD AUTO: 0.57 K/UL — SIGNIFICANT CHANGE UP (ref 0.1–0.6)
MONOCYTES NFR BLD AUTO: 11.7 % — HIGH (ref 1.7–9.3)
NEUTROPHILS # BLD AUTO: 3.27 K/UL — SIGNIFICANT CHANGE UP (ref 1.4–6.5)
NEUTROPHILS NFR BLD AUTO: 67.1 % — SIGNIFICANT CHANGE UP (ref 42.2–75.2)
NRBC # BLD: 0 /100 WBCS — SIGNIFICANT CHANGE UP (ref 0–0)
PLATELET # BLD AUTO: 173 K/UL — SIGNIFICANT CHANGE UP (ref 130–400)
POTASSIUM SERPL-MCNC: 4.7 MMOL/L — SIGNIFICANT CHANGE UP (ref 3.5–5)
POTASSIUM SERPL-SCNC: 4.7 MMOL/L — SIGNIFICANT CHANGE UP (ref 3.5–5)
PROT SERPL-MCNC: 5.8 G/DL — LOW (ref 6–8)
RBC # BLD: 4.02 M/UL — LOW (ref 4.7–6.1)
RBC # FLD: 13.1 % — SIGNIFICANT CHANGE UP (ref 11.5–14.5)
SODIUM SERPL-SCNC: 142 MMOL/L — SIGNIFICANT CHANGE UP (ref 135–146)
WBC # BLD: 4.88 K/UL — SIGNIFICANT CHANGE UP (ref 4.8–10.8)
WBC # FLD AUTO: 4.88 K/UL — SIGNIFICANT CHANGE UP (ref 4.8–10.8)

## 2021-03-10 PROCEDURE — 88311 DECALCIFY TISSUE: CPT | Mod: 26

## 2021-03-10 PROCEDURE — 99232 SBSQ HOSP IP/OBS MODERATE 35: CPT | Mod: GC

## 2021-03-10 PROCEDURE — 88305 TISSUE EXAM BY PATHOLOGIST: CPT | Mod: 26

## 2021-03-10 PROCEDURE — 28010 INCISION OF TOE TENDON: CPT | Mod: T2

## 2021-03-10 PROCEDURE — 20220 BONE BIOPSY TROCAR/NDL SUPFC: CPT | Mod: 59

## 2021-03-10 PROCEDURE — 93306 TTE W/DOPPLER COMPLETE: CPT | Mod: 26

## 2021-03-10 PROCEDURE — 73630 X-RAY EXAM OF FOOT: CPT | Mod: 26,LT

## 2021-03-10 PROCEDURE — 88302 TISSUE EXAM BY PATHOLOGIST: CPT | Mod: 26

## 2021-03-10 PROCEDURE — 28820 AMPUTATION OF TOE: CPT | Mod: T1

## 2021-03-10 RX ORDER — CEFEPIME 1 G/1
2000 INJECTION, POWDER, FOR SOLUTION INTRAMUSCULAR; INTRAVENOUS EVERY 8 HOURS
Refills: 0 | Status: DISCONTINUED | OUTPATIENT
Start: 2021-03-10 | End: 2021-03-11

## 2021-03-10 RX ORDER — PANTOPRAZOLE SODIUM 20 MG/1
40 TABLET, DELAYED RELEASE ORAL
Refills: 0 | Status: DISCONTINUED | OUTPATIENT
Start: 2021-03-10 | End: 2021-03-11

## 2021-03-10 RX ORDER — ENOXAPARIN SODIUM 100 MG/ML
40 INJECTION SUBCUTANEOUS DAILY
Refills: 0 | Status: DISCONTINUED | OUTPATIENT
Start: 2021-03-10 | End: 2021-03-12

## 2021-03-10 RX ORDER — INSULIN LISPRO 100/ML
8 VIAL (ML) SUBCUTANEOUS
Refills: 0 | Status: DISCONTINUED | OUTPATIENT
Start: 2021-03-10 | End: 2021-03-12

## 2021-03-10 RX ORDER — INSULIN LISPRO 100/ML
VIAL (ML) SUBCUTANEOUS
Refills: 0 | Status: DISCONTINUED | OUTPATIENT
Start: 2021-03-10 | End: 2021-03-12

## 2021-03-10 RX ORDER — INSULIN GLARGINE 100 [IU]/ML
33 INJECTION, SOLUTION SUBCUTANEOUS AT BEDTIME
Refills: 0 | Status: DISCONTINUED | OUTPATIENT
Start: 2021-03-10 | End: 2021-03-12

## 2021-03-10 RX ORDER — LISINOPRIL 2.5 MG/1
40 TABLET ORAL DAILY
Refills: 0 | Status: DISCONTINUED | OUTPATIENT
Start: 2021-03-10 | End: 2021-03-12

## 2021-03-10 RX ORDER — NIFEDIPINE 30 MG
60 TABLET, EXTENDED RELEASE 24 HR ORAL DAILY
Refills: 0 | Status: DISCONTINUED | OUTPATIENT
Start: 2021-03-10 | End: 2021-03-12

## 2021-03-10 RX ORDER — HYDROMORPHONE HYDROCHLORIDE 2 MG/ML
1 INJECTION INTRAMUSCULAR; INTRAVENOUS; SUBCUTANEOUS
Refills: 0 | Status: DISCONTINUED | OUTPATIENT
Start: 2021-03-10 | End: 2021-03-10

## 2021-03-10 RX ORDER — ATORVASTATIN CALCIUM 80 MG/1
40 TABLET, FILM COATED ORAL AT BEDTIME
Refills: 0 | Status: DISCONTINUED | OUTPATIENT
Start: 2021-03-10 | End: 2021-03-12

## 2021-03-10 RX ORDER — ASPIRIN/CALCIUM CARB/MAGNESIUM 324 MG
81 TABLET ORAL DAILY
Refills: 0 | Status: DISCONTINUED | OUTPATIENT
Start: 2021-03-10 | End: 2021-03-12

## 2021-03-10 RX ORDER — LACTOBACILLUS ACIDOPHILUS 100MM CELL
1 CAPSULE ORAL DAILY
Refills: 0 | Status: DISCONTINUED | OUTPATIENT
Start: 2021-03-10 | End: 2021-03-12

## 2021-03-10 RX ORDER — SODIUM CHLORIDE 9 MG/ML
1000 INJECTION, SOLUTION INTRAVENOUS
Refills: 0 | Status: DISCONTINUED | OUTPATIENT
Start: 2021-03-10 | End: 2021-03-10

## 2021-03-10 RX ORDER — HYDROMORPHONE HYDROCHLORIDE 2 MG/ML
0.5 INJECTION INTRAMUSCULAR; INTRAVENOUS; SUBCUTANEOUS
Refills: 0 | Status: DISCONTINUED | OUTPATIENT
Start: 2021-03-10 | End: 2021-03-10

## 2021-03-10 RX ORDER — METRONIDAZOLE 500 MG
500 TABLET ORAL EVERY 8 HOURS
Refills: 0 | Status: DISCONTINUED | OUTPATIENT
Start: 2021-03-10 | End: 2021-03-11

## 2021-03-10 RX ADMIN — Medication 60 MILLIGRAM(S): at 05:01

## 2021-03-10 RX ADMIN — Medication 250 MILLIGRAM(S): at 06:06

## 2021-03-10 RX ADMIN — CEFEPIME 100 MILLIGRAM(S): 1 INJECTION, POWDER, FOR SOLUTION INTRAMUSCULAR; INTRAVENOUS at 13:03

## 2021-03-10 RX ADMIN — Medication 100 MILLIGRAM(S): at 13:10

## 2021-03-10 RX ADMIN — CEFEPIME 100 MILLIGRAM(S): 1 INJECTION, POWDER, FOR SOLUTION INTRAMUSCULAR; INTRAVENOUS at 21:40

## 2021-03-10 RX ADMIN — Medication 100 MILLIGRAM(S): at 05:01

## 2021-03-10 RX ADMIN — Medication 1 TABLET(S): at 13:03

## 2021-03-10 RX ADMIN — Medication 100 MILLIGRAM(S): at 21:40

## 2021-03-10 RX ADMIN — INSULIN GLARGINE 33 UNIT(S): 100 INJECTION, SOLUTION SUBCUTANEOUS at 22:00

## 2021-03-10 RX ADMIN — ENOXAPARIN SODIUM 40 MILLIGRAM(S): 100 INJECTION SUBCUTANEOUS at 13:03

## 2021-03-10 RX ADMIN — Medication 81 MILLIGRAM(S): at 13:03

## 2021-03-10 RX ADMIN — CEFEPIME 100 MILLIGRAM(S): 1 INJECTION, POWDER, FOR SOLUTION INTRAMUSCULAR; INTRAVENOUS at 05:01

## 2021-03-10 RX ADMIN — PANTOPRAZOLE SODIUM 40 MILLIGRAM(S): 20 TABLET, DELAYED RELEASE ORAL at 05:06

## 2021-03-10 RX ADMIN — ATORVASTATIN CALCIUM 40 MILLIGRAM(S): 80 TABLET, FILM COATED ORAL at 22:00

## 2021-03-10 RX ADMIN — LISINOPRIL 40 MILLIGRAM(S): 2.5 TABLET ORAL at 05:01

## 2021-03-10 NOTE — CHART NOTE - NSCHARTNOTEFT_GEN_A_CORE
PACU ANESTHESIA ADMISSION NOTE      Procedure: Percutaneous needle biopsy of metatarsal bone    Tenotomy, flexor, toe, open    Amputation, toe, left, single      Post op diagnosis:  Hammer toe of left foot    Osteomyelitis        ____  Intubated  TV:______       Rate: ______      FiO2: ______    ___X_  Patent Airway    ___X_  Full return of protective reflexes    ____  Full recovery from anesthesia / back to baseline     Vitals:   T:           R:                  BP:                  Sat:                   P:   SEE ANESTHESIA RECORD    Mental Status:  ___X_ Awake   ____X_ Alert   _____ Drowsy   _____ Sedated    Nausea/Vomiting:  ____ NO  ____X__Yes,   See Post - Op Orders          Pain Scale (0-10):  _____    Treatment: ____ None    ___X_ See Post - Op/PCA Orders    Post - Operative Fluids:   ____ Oral   ___X_ See Post - Op Orders    Plan: Discharge:   ____Home       __X___Floor     _____Critical Care    _____  Other:_________________    Comments:

## 2021-03-10 NOTE — BRIEF OPERATIVE NOTE - NSICDXBRIEFPOSTOP_GEN_ALL_CORE_FT
POST-OP DIAGNOSIS:  Hammer toe of left foot 10-Mar-2021 16:03:24  Royal Bhatia  Osteomyelitis 10-Mar-2021 16:02:53  Royal Bhatia

## 2021-03-10 NOTE — BRIEF OPERATIVE NOTE - NSICDXBRIEFPREOP_GEN_ALL_CORE_FT
PRE-OP DIAGNOSIS:  Hammer toe of left foot 10-Mar-2021 16:02:40  Royal Bhatia  Osteomyelitis 10-Mar-2021 16:02:32  Royal Bhatia

## 2021-03-10 NOTE — BRIEF OPERATIVE NOTE - NSICDXBRIEFPROCEDURE_GEN_ALL_CORE_FT
PROCEDURES:  Percutaneous needle biopsy of metatarsal bone 10-Mar-2021 16:02:19  Royal Bhatia  Tenotomy, flexor, toe, open 10-Mar-2021 16:01:57  Royal Bhatia  Amputation, toe, left, single 10-Mar-2021 16:01:37  Royal Bhatia

## 2021-03-10 NOTE — PRE-ANESTHESIA EVALUATION ADULT - NSANTHOSAYNRD_GEN_A_CORE
No. MACHELLE screening performed.  STOP BANG Legend: 0-2 = LOW Risk; 3-4 = INTERMEDIATE Risk; 5-8 = HIGH Risk
No. MACHELLE screening performed.  STOP BANG Legend: 0-2 = LOW Risk; 3-4 = INTERMEDIATE Risk; 5-8 = HIGH Risk

## 2021-03-11 ENCOUNTER — TRANSCRIPTION ENCOUNTER (OUTPATIENT)
Age: 71
End: 2021-03-11

## 2021-03-11 LAB
ANION GAP SERPL CALC-SCNC: 14 MMOL/L — SIGNIFICANT CHANGE UP (ref 7–14)
BASOPHILS # BLD AUTO: 0.01 K/UL — SIGNIFICANT CHANGE UP (ref 0–0.2)
BASOPHILS NFR BLD AUTO: 0.1 % — SIGNIFICANT CHANGE UP (ref 0–1)
BUN SERPL-MCNC: 31 MG/DL — HIGH (ref 10–20)
CALCIUM SERPL-MCNC: 9.5 MG/DL — SIGNIFICANT CHANGE UP (ref 8.5–10.1)
CHLORIDE SERPL-SCNC: 104 MMOL/L — SIGNIFICANT CHANGE UP (ref 98–110)
CO2 SERPL-SCNC: 24 MMOL/L — SIGNIFICANT CHANGE UP (ref 17–32)
CREAT SERPL-MCNC: 1.3 MG/DL — SIGNIFICANT CHANGE UP (ref 0.7–1.5)
EOSINOPHIL # BLD AUTO: 0 K/UL — SIGNIFICANT CHANGE UP (ref 0–0.7)
EOSINOPHIL NFR BLD AUTO: 0 % — SIGNIFICANT CHANGE UP (ref 0–8)
GLUCOSE BLDC GLUCOMTR-MCNC: 109 MG/DL — HIGH (ref 70–99)
GLUCOSE BLDC GLUCOMTR-MCNC: 261 MG/DL — HIGH (ref 70–99)
GLUCOSE BLDC GLUCOMTR-MCNC: 294 MG/DL — HIGH (ref 70–99)
GLUCOSE BLDC GLUCOMTR-MCNC: 380 MG/DL — HIGH (ref 70–99)
GLUCOSE SERPL-MCNC: 292 MG/DL — HIGH (ref 70–99)
GRAM STN FLD: SIGNIFICANT CHANGE UP
HCT VFR BLD CALC: 39.2 % — LOW (ref 42–52)
HGB BLD-MCNC: 13 G/DL — LOW (ref 14–18)
IMM GRANULOCYTES NFR BLD AUTO: 0.5 % — HIGH (ref 0.1–0.3)
LYMPHOCYTES # BLD AUTO: 0.58 K/UL — LOW (ref 1.2–3.4)
LYMPHOCYTES # BLD AUTO: 6.8 % — LOW (ref 20.5–51.1)
MAGNESIUM SERPL-MCNC: 2.3 MG/DL — SIGNIFICANT CHANGE UP (ref 1.8–2.4)
MCHC RBC-ENTMCNC: 29.5 PG — SIGNIFICANT CHANGE UP (ref 27–31)
MCHC RBC-ENTMCNC: 33.2 G/DL — SIGNIFICANT CHANGE UP (ref 32–37)
MCV RBC AUTO: 89.1 FL — SIGNIFICANT CHANGE UP (ref 80–94)
MONOCYTES # BLD AUTO: 0.6 K/UL — SIGNIFICANT CHANGE UP (ref 0.1–0.6)
MONOCYTES NFR BLD AUTO: 7.1 % — SIGNIFICANT CHANGE UP (ref 1.7–9.3)
NEUTROPHILS # BLD AUTO: 7.24 K/UL — HIGH (ref 1.4–6.5)
NEUTROPHILS NFR BLD AUTO: 85.5 % — HIGH (ref 42.2–75.2)
NRBC # BLD: 0 /100 WBCS — SIGNIFICANT CHANGE UP (ref 0–0)
PLATELET # BLD AUTO: 209 K/UL — SIGNIFICANT CHANGE UP (ref 130–400)
POTASSIUM SERPL-MCNC: 5.4 MMOL/L — HIGH (ref 3.5–5)
POTASSIUM SERPL-SCNC: 5.4 MMOL/L — HIGH (ref 3.5–5)
RBC # BLD: 4.4 M/UL — LOW (ref 4.7–6.1)
RBC # FLD: 13.1 % — SIGNIFICANT CHANGE UP (ref 11.5–14.5)
SODIUM SERPL-SCNC: 142 MMOL/L — SIGNIFICANT CHANGE UP (ref 135–146)
SPECIMEN SOURCE: SIGNIFICANT CHANGE UP
WBC # BLD: 8.47 K/UL — SIGNIFICANT CHANGE UP (ref 4.8–10.8)
WBC # FLD AUTO: 8.47 K/UL — SIGNIFICANT CHANGE UP (ref 4.8–10.8)

## 2021-03-11 PROCEDURE — 99232 SBSQ HOSP IP/OBS MODERATE 35: CPT | Mod: GC

## 2021-03-11 RX ORDER — PANTOPRAZOLE SODIUM 20 MG/1
40 TABLET, DELAYED RELEASE ORAL
Refills: 0 | Status: DISCONTINUED | OUTPATIENT
Start: 2021-03-12 | End: 2021-03-12

## 2021-03-11 RX ORDER — SODIUM ZIRCONIUM CYCLOSILICATE 10 G/10G
5 POWDER, FOR SUSPENSION ORAL ONCE
Refills: 0 | Status: COMPLETED | OUTPATIENT
Start: 2021-03-11 | End: 2021-03-11

## 2021-03-11 RX ADMIN — ATORVASTATIN CALCIUM 40 MILLIGRAM(S): 80 TABLET, FILM COATED ORAL at 23:09

## 2021-03-11 RX ADMIN — Medication 1 TABLET(S): at 11:30

## 2021-03-11 RX ADMIN — Medication 8 UNIT(S): at 08:19

## 2021-03-11 RX ADMIN — Medication 8 UNIT(S): at 12:22

## 2021-03-11 RX ADMIN — Medication 100 MILLIGRAM(S): at 05:47

## 2021-03-11 RX ADMIN — Medication 6: at 08:19

## 2021-03-11 RX ADMIN — INSULIN GLARGINE 33 UNIT(S): 100 INJECTION, SOLUTION SUBCUTANEOUS at 23:09

## 2021-03-11 RX ADMIN — SODIUM ZIRCONIUM CYCLOSILICATE 5 GRAM(S): 10 POWDER, FOR SUSPENSION ORAL at 12:10

## 2021-03-11 RX ADMIN — CEFEPIME 100 MILLIGRAM(S): 1 INJECTION, POWDER, FOR SOLUTION INTRAMUSCULAR; INTRAVENOUS at 05:46

## 2021-03-11 RX ADMIN — Medication 10: at 12:22

## 2021-03-11 RX ADMIN — Medication 81 MILLIGRAM(S): at 11:30

## 2021-03-11 RX ADMIN — LISINOPRIL 40 MILLIGRAM(S): 2.5 TABLET ORAL at 05:46

## 2021-03-11 RX ADMIN — PANTOPRAZOLE SODIUM 40 MILLIGRAM(S): 20 TABLET, DELAYED RELEASE ORAL at 05:47

## 2021-03-11 RX ADMIN — ENOXAPARIN SODIUM 40 MILLIGRAM(S): 100 INJECTION SUBCUTANEOUS at 11:30

## 2021-03-11 RX ADMIN — Medication 8 UNIT(S): at 17:33

## 2021-03-11 RX ADMIN — Medication 60 MILLIGRAM(S): at 05:46

## 2021-03-11 NOTE — DISCHARGE NOTE PROVIDER - HOSPITAL COURSE
Patient is a 72yo male with PMH of insulin-dependent diabetes mellitus complicated by diabetic foot ulcers s/p amputation of half of left great toe, entire right great toe, right 3rd toe, right 4th toe, peripheral vascular disease, hypertension, hyperlipidemia, GERD, and depression who was instructed to come to the ED by his podiatrist Marko Bhatia for osteomyelitis of the left 2nd toe with possible amputation. The patient is unsure of when his foot ulcer began, but knows that he has been treated for them for quite some time. He denies any other symptoms at this time, including fever, chills, chest pain, palpitations, shortness of breath, or abdominal pain.    In the ED, vital signs were Tmax 98.3F, HR 92, /88, RR 20, SpO2 99% on room air. Patient was given vancomycin 1g IV and Zosyn 3.375mg IV. Patient was evaluated by the podiatry team in the ED. Patient is POD 1 after left toe amputation and tenotomy. Patient is stable for discharge and to follow up with podiatry team outpatient. Patient is a 70yo male with PMH of insulin-dependent diabetes mellitus complicated by diabetic foot ulcers s/p amputation of half of left great toe, entire right great toe, right 3rd toe, right 4th toe, peripheral vascular disease, hypertension, hyperlipidemia, GERD, and depression who was instructed to come to the ED by his podiatrist Marko Bhatia for osteomyelitis of the left 2nd toe with possible amputation. The patient is unsure of when his foot ulcer began, but knows that he has been treated for them for quite some time. He denies any other symptoms at this time, including fever, chills, chest pain, palpitations, shortness of breath, or abdominal pain.    In the ED, vital signs were Tmax 98.3F, HR 92, /88, RR 20, SpO2 99% on room air. Patient was given vancomycin 1g IV and Zosyn 3.375mg IV. Patient was evaluated by the podiatry team in the ED. Patient is POD 1 after left toe amputation and tenotomy. Patient is stable for discharge and to follow up with podiatry team outpatient.       Attending addendum: Patient seen and examined. Stable for discharge. Med rec reviewed. C/w ACEi, noted mild hyperkalemia, will continue since K is  stable and less than 5.5.

## 2021-03-11 NOTE — DISCHARGE NOTE PROVIDER - CARE PROVIDER_API CALL
Khris Shelton)  [address]  [address] [place] [address]  Phone: [phone]  Fax: [phone]  Established Patient  Follow Up Time: 1 week    Royal Bhatia ([facility])  Podiatric Medicine  [address] [place] [address], [address] [place] [address]  Phone: [phone]  Fax: [phone]  Established Patient  Follow Up Time: 1 week

## 2021-03-11 NOTE — DISCHARGE NOTE PROVIDER - CARE PROVIDERS DIRECT ADDRESSES
[email or web address]

## 2021-03-11 NOTE — PROGRESS NOTE ADULT - ATTENDING COMMENTS
Pt was seen and examined at the bedside independently, pt c/o DFU on left, has h/o partial foot amputation on the right.  He was uncontrolled DM, HA1C is more that 13, need counseling and education regarding diet, medications compliance, will monitor finger stick and c/w Insulin, he needs tight glucemic control to promote wound healing.  On admission he was started on triple Abx, ID consulted today, can d/c Abx for now and send surgical Cx ( bone histopathology and wound Cx), will resume Abx after Sx.   Pt has a h/o PVD, get LLE arterial Doppler to evaluate perfusion, consider vascular surgery evaluation if indicated.     He denies any cardiac history in the past, repeat 2Decho is pending ( 2Decho from remote past reviewed), he has uncontrolled DM, based on his risk factors he has intermediate risk for Sx.     I agree with medical resident's findings, assessment and plan above, podiatry is planning Sx in AM, keep NPO after midnight, hold Lovenox tonight.    #Progress Note Handoff  Pending (specify):  d/c Abx , NPO after midnight for debridement in AM, hold PM and AM doses of Lovenox, send bone histopathology and Cx from OR, get arterial Doppler, consider vascular surgery eval if indicated. Pt needs education regarding carb consistent diet, medications compliance, has uncontrolled DM.   Family discussion: I spoke with pt, he agreed with a plan of care   Disposition: Home___/SNF___/Other________/Unknown at this time_____x___
#Progress Note Handoff  Pending (specify):  amputation today   Family discussion: N/A  Disposition: TBD
Patient seen and examined. Patient cleared for discharge from ID and podiatry standpoint, pending delivery of surgical shoes. Dc home in AM.

## 2021-03-11 NOTE — DISCHARGE NOTE PROVIDER - NSDCMRMEDTOKEN_GEN_ALL_CORE_FT
acetaminophen 650 mg oral tablet, extended release: 1 tab(s) orally 3 times a day, As Needed  Aspirin Enteric Coated 81 mg oral delayed release tablet: 1 tab(s) orally once a day   benazepril 40 mg oral tablet: 1 tab(s) orally once a day  Crestor 10 mg oral tablet: 1 tab(s) orally once a day (at bedtime)  Janumet 50 mg-1000 mg oral tablet: 1 tab(s) orally 2 times a day  Lantus 100 units/mL subcutaneous solution: 30 unit(s) subcutaneous once a day in the morning  NIFEdipine 60 mg oral tablet, extended release: 1 tab(s) orally once a day  omeprazole 20 mg oral delayed release capsule: 1 cap(s) orally once a day  Trulicity Pen 0.75 mg/0.5 mL subcutaneous solution:    acetaminophen 650 mg oral tablet, extended release: 1 tab(s) orally 3 times a day, As Needed  amoxicillin-clavulanate 875 mg-125 mg oral tablet: take one tablet every 12 hours for a total duration of 7 days   Aspirin Enteric Coated 81 mg oral delayed release tablet: 1 tab(s) orally once a day   benazepril 40 mg oral tablet: 1 tab(s) orally once a day  Crestor 10 mg oral tablet: 1 tab(s) orally once a day (at bedtime)  Janumet 50 mg-1000 mg oral tablet: 1 tab(s) orally 2 times a day  Lantus 100 units/mL subcutaneous solution: 30 unit(s) subcutaneous once a day in the morning  NIFEdipine 60 mg oral tablet, extended release: 1 tab(s) orally once a day  omeprazole 20 mg oral delayed release capsule: 1 cap(s) orally once a day  Trulicity Pen 0.75 mg/0.5 mL subcutaneous solution:

## 2021-03-11 NOTE — DISCHARGE NOTE PROVIDER - PROVIDER TOKENS
PROVIDER:[TOKEN:[[id number]],FOLLOWUP:[1 week],ESTABLISHEDPATIENT:[T]],PROVIDER:[TOKEN:[[id number]],FOLLOWUP:[1 week],ESTABLISHEDPATIENT:[T]]

## 2021-03-11 NOTE — DISCHARGE NOTE PROVIDER - NSDCCPCAREPLAN_GEN_ALL_CORE_FT
PRINCIPAL DISCHARGE DIAGNOSIS  Diagnosis: Osteomyelitis of left foot, unspecified type  Assessment and Plan of Treatment: You were admitted because your left foot/toe had in infection which progressed to gangrene in which the podiatry team had to due an amputation of that area. Please continue taking the prescribed medications and antibiotic (augmentin 875 mg every 12 hours for a total duration of 7 days). Please follow up with your podiatrist outpatient Dr. Bhatia at [address] [place] [address] Podiatry Clinic in 1 week and wear  your bilateral surgical shoe. Please also follow up with your  primary care physician in 1-2 weeks.   Please take appropriate foot care and please control your diet/sugars to better monitor your diabetes.

## 2021-03-12 VITALS
TEMPERATURE: 98 F | RESPIRATION RATE: 18 BRPM | SYSTOLIC BLOOD PRESSURE: 131 MMHG | HEART RATE: 76 BPM | DIASTOLIC BLOOD PRESSURE: 59 MMHG

## 2021-03-12 LAB
ANION GAP SERPL CALC-SCNC: 10 MMOL/L — SIGNIFICANT CHANGE UP (ref 7–14)
ANION GAP SERPL CALC-SCNC: 10 MMOL/L — SIGNIFICANT CHANGE UP (ref 7–14)
BASOPHILS # BLD AUTO: 0.02 K/UL — SIGNIFICANT CHANGE UP (ref 0–0.2)
BASOPHILS # BLD AUTO: 0.03 K/UL — SIGNIFICANT CHANGE UP (ref 0–0.2)
BASOPHILS NFR BLD AUTO: 0.3 % — SIGNIFICANT CHANGE UP (ref 0–1)
BASOPHILS NFR BLD AUTO: 0.4 % — SIGNIFICANT CHANGE UP (ref 0–1)
BUN SERPL-MCNC: 37 MG/DL — HIGH (ref 10–20)
BUN SERPL-MCNC: 42 MG/DL — HIGH (ref 10–20)
CALCIUM SERPL-MCNC: 8.5 MG/DL — SIGNIFICANT CHANGE UP (ref 8.5–10.1)
CALCIUM SERPL-MCNC: 9.2 MG/DL — SIGNIFICANT CHANGE UP (ref 8.5–10.1)
CHLORIDE SERPL-SCNC: 107 MMOL/L — SIGNIFICANT CHANGE UP (ref 98–110)
CHLORIDE SERPL-SCNC: 107 MMOL/L — SIGNIFICANT CHANGE UP (ref 98–110)
CO2 SERPL-SCNC: 21 MMOL/L — SIGNIFICANT CHANGE UP (ref 17–32)
CO2 SERPL-SCNC: 23 MMOL/L — SIGNIFICANT CHANGE UP (ref 17–32)
CREAT SERPL-MCNC: 1.5 MG/DL — SIGNIFICANT CHANGE UP (ref 0.7–1.5)
CREAT SERPL-MCNC: 1.6 MG/DL — HIGH (ref 0.7–1.5)
EOSINOPHIL # BLD AUTO: 0.11 K/UL — SIGNIFICANT CHANGE UP (ref 0–0.7)
EOSINOPHIL # BLD AUTO: 0.11 K/UL — SIGNIFICANT CHANGE UP (ref 0–0.7)
EOSINOPHIL NFR BLD AUTO: 1.5 % — SIGNIFICANT CHANGE UP (ref 0–8)
EOSINOPHIL NFR BLD AUTO: 1.6 % — SIGNIFICANT CHANGE UP (ref 0–8)
GLUCOSE BLDC GLUCOMTR-MCNC: 100 MG/DL — HIGH (ref 70–99)
GLUCOSE BLDC GLUCOMTR-MCNC: 262 MG/DL — HIGH (ref 70–99)
GLUCOSE BLDC GLUCOMTR-MCNC: 298 MG/DL — HIGH (ref 70–99)
GLUCOSE SERPL-MCNC: 140 MG/DL — HIGH (ref 70–99)
GLUCOSE SERPL-MCNC: 318 MG/DL — HIGH (ref 70–99)
HCT VFR BLD CALC: 37.4 % — LOW (ref 42–52)
HCT VFR BLD CALC: 40.8 % — LOW (ref 42–52)
HGB BLD-MCNC: 11.9 G/DL — LOW (ref 14–18)
HGB BLD-MCNC: 12.9 G/DL — LOW (ref 14–18)
IMM GRANULOCYTES NFR BLD AUTO: 0.4 % — HIGH (ref 0.1–0.3)
IMM GRANULOCYTES NFR BLD AUTO: 0.6 % — HIGH (ref 0.1–0.3)
LYMPHOCYTES # BLD AUTO: 1.07 K/UL — LOW (ref 1.2–3.4)
LYMPHOCYTES # BLD AUTO: 1.1 K/UL — LOW (ref 1.2–3.4)
LYMPHOCYTES # BLD AUTO: 15 % — LOW (ref 20.5–51.1)
LYMPHOCYTES # BLD AUTO: 16 % — LOW (ref 20.5–51.1)
MAGNESIUM SERPL-MCNC: 2.2 MG/DL — SIGNIFICANT CHANGE UP (ref 1.8–2.4)
MCHC RBC-ENTMCNC: 29.1 PG — SIGNIFICANT CHANGE UP (ref 27–31)
MCHC RBC-ENTMCNC: 29.4 PG — SIGNIFICANT CHANGE UP (ref 27–31)
MCHC RBC-ENTMCNC: 31.6 G/DL — LOW (ref 32–37)
MCHC RBC-ENTMCNC: 31.8 G/DL — LOW (ref 32–37)
MCV RBC AUTO: 92.1 FL — SIGNIFICANT CHANGE UP (ref 80–94)
MCV RBC AUTO: 92.3 FL — SIGNIFICANT CHANGE UP (ref 80–94)
MONOCYTES # BLD AUTO: 0.71 K/UL — HIGH (ref 0.1–0.6)
MONOCYTES # BLD AUTO: 0.78 K/UL — HIGH (ref 0.1–0.6)
MONOCYTES NFR BLD AUTO: 10.3 % — HIGH (ref 1.7–9.3)
MONOCYTES NFR BLD AUTO: 10.9 % — HIGH (ref 1.7–9.3)
NEUTROPHILS # BLD AUTO: 4.91 K/UL — SIGNIFICANT CHANGE UP (ref 1.4–6.5)
NEUTROPHILS # BLD AUTO: 5.12 K/UL — SIGNIFICANT CHANGE UP (ref 1.4–6.5)
NEUTROPHILS NFR BLD AUTO: 71.4 % — SIGNIFICANT CHANGE UP (ref 42.2–75.2)
NEUTROPHILS NFR BLD AUTO: 71.6 % — SIGNIFICANT CHANGE UP (ref 42.2–75.2)
NRBC # BLD: 0 /100 WBCS — SIGNIFICANT CHANGE UP (ref 0–0)
NRBC # BLD: 0 /100 WBCS — SIGNIFICANT CHANGE UP (ref 0–0)
PLATELET # BLD AUTO: 100 K/UL — LOW (ref 130–400)
PLATELET # BLD AUTO: 184 K/UL — SIGNIFICANT CHANGE UP (ref 130–400)
POTASSIUM SERPL-MCNC: 5.4 MMOL/L — HIGH (ref 3.5–5)
POTASSIUM SERPL-MCNC: 5.6 MMOL/L — HIGH (ref 3.5–5)
POTASSIUM SERPL-SCNC: 5.4 MMOL/L — HIGH (ref 3.5–5)
POTASSIUM SERPL-SCNC: 5.6 MMOL/L — HIGH (ref 3.5–5)
RBC # BLD: 4.05 M/UL — LOW (ref 4.7–6.1)
RBC # BLD: 4.43 M/UL — LOW (ref 4.7–6.1)
RBC # FLD: 13.2 % — SIGNIFICANT CHANGE UP (ref 11.5–14.5)
RBC # FLD: 13.2 % — SIGNIFICANT CHANGE UP (ref 11.5–14.5)
SODIUM SERPL-SCNC: 138 MMOL/L — SIGNIFICANT CHANGE UP (ref 135–146)
SODIUM SERPL-SCNC: 140 MMOL/L — SIGNIFICANT CHANGE UP (ref 135–146)
WBC # BLD: 6.88 K/UL — SIGNIFICANT CHANGE UP (ref 4.8–10.8)
WBC # BLD: 7.15 K/UL — SIGNIFICANT CHANGE UP (ref 4.8–10.8)
WBC # FLD AUTO: 6.88 K/UL — SIGNIFICANT CHANGE UP (ref 4.8–10.8)
WBC # FLD AUTO: 7.15 K/UL — SIGNIFICANT CHANGE UP (ref 4.8–10.8)

## 2021-03-12 RX ORDER — SODIUM CHLORIDE 9 MG/ML
1000 INJECTION INTRAMUSCULAR; INTRAVENOUS; SUBCUTANEOUS ONCE
Refills: 0 | Status: COMPLETED | OUTPATIENT
Start: 2021-03-12 | End: 2021-03-12

## 2021-03-12 RX ADMIN — Medication 8 UNIT(S): at 08:20

## 2021-03-12 RX ADMIN — Medication 8 UNIT(S): at 12:40

## 2021-03-12 RX ADMIN — LISINOPRIL 40 MILLIGRAM(S): 2.5 TABLET ORAL at 05:44

## 2021-03-12 RX ADMIN — PANTOPRAZOLE SODIUM 40 MILLIGRAM(S): 20 TABLET, DELAYED RELEASE ORAL at 05:44

## 2021-03-12 RX ADMIN — Medication 6: at 12:40

## 2021-03-12 RX ADMIN — Medication 60 MILLIGRAM(S): at 05:44

## 2021-03-12 RX ADMIN — Medication 1 TABLET(S): at 17:38

## 2021-03-12 RX ADMIN — Medication 1 TABLET(S): at 11:41

## 2021-03-12 RX ADMIN — Medication 6: at 08:20

## 2021-03-12 RX ADMIN — Medication 81 MILLIGRAM(S): at 11:41

## 2021-03-12 RX ADMIN — SODIUM CHLORIDE 1000 MILLILITER(S): 9 INJECTION INTRAMUSCULAR; INTRAVENOUS; SUBCUTANEOUS at 11:26

## 2021-03-12 RX ADMIN — Medication 8 UNIT(S): at 17:38

## 2021-03-12 RX ADMIN — ENOXAPARIN SODIUM 40 MILLIGRAM(S): 100 INJECTION SUBCUTANEOUS at 11:41

## 2021-03-12 NOTE — PROGRESS NOTE ADULT - ASSESSMENT
Patient is a 72yo male with PMH of insulin-dependent diabetes mellitus complicated by diabetic foot ulcers s/p amputation of half of left great toe, entire right great toe, right 3rd toe, right 4th toe, peripheral vascular disease, hypertension, hyperlipidemia, GERD, and depression who was instructed to come to the ED by his podiatrist Marko Bhatia for osteomyelitis of the left 2nd toe with possible amputation.      #Gangrene of left 2nd toe  - No sepsis present on admission (WBC 9, afebrile, HR >90, no tachypnea)  - Podiatry consult appreciated  - Wound care per podiatry  - Given history of MDRO in past requiring IV ertapenem on discharge, will consult ID  - Had multiple debridements and amputations in the past, wound infections with multiple organisms (enterobacter, stenotrophomonas pseudomonas, group B strep, corynebacterium, E. coli)  - No evidence of soft tissue infection on examination  - MRI findings may be due to chronic inflammation  - s/p POD 1 left toe amputation/tenotomy/percutaneous needle biopsy   - patient is cleared for discharge as per podiatry and to follow up with  at [address] [place] [address] [facility]ry [facility] a week post discharge;  - As per ID patient cleared to go home on augmentin 875 mg BID x 7 days   awaiting bilateral surgical shoe to be delivered on bilateral feet prior to discharge    #Diabetes mellitus type 2  - Hemoglobin A1c 3/13/2021  - Patient takes insulin 70/30 50 units in AM and 40 units in PM, as well as other PO medications  - Will resume insulin regimen from previous admission  - Start insulin glargine 33 units SQ QHS  - Start insulin lispro 8 units SQ TID  - Insulin sliding scale coverage  - Monitor fingerstick glucose  - Repeat A1c in AM     #Hypertension, uncontrolled  - Continue with nifedipine 60mg PO once daily and lisinopril 40mg PO once daily     #Peripheral vascular disease  - Continue with atorvastatin 40mg PO at bedtime and aspirin 81mg PO once daily     #Hyperlipidemia  - Continue with atorvastatin 40mg PO at bedtime     #Misc  - DVT Prophylaxis: Lovenox 40mg SQ once daily   - GI Prophylaxis: pantoprazole 40mg PO once daily   - Diet: DASH/TLC, consistent carbohydrate  - Activity: ambulate as tolerated, WBAT to left lower extremity  - Code Status: Full Code  Dispo: d/c on augmentin after patient receives bilateral surgical shoes 
Patient is a 72yo male with PMH of insulin-dependent diabetes mellitus complicated by diabetic foot ulcers s/p amputation of half of left great toe, entire right great toe, right 3rd toe, right 4th toe, peripheral vascular disease, hypertension, hyperlipidemia, GERD, and depression who was instructed to come to the ED by his podiatrist Marko Bhatia for osteomyelitis of the left 2nd toe with possible amputation.      #Gangrene of left 2nd toe  - No sepsis present on admission (WBC 9, afebrile, HR >90, no tachypnea)  - Podiatry consult appreciated  - Wound care per podiatry  - Given history of MDRO in past requiring IV ertapenem on discharge, will consult ID  - Had multiple debridements and amputations in the past, wound infections with multiple organisms (enterobacter, stenotrophomonas pseudomonas, group B strep, corynebacterium, E. coli)  - No evidence of soft tissue infection on examination  - MRI findings may be due to chronic inflammation  - Patient scheduled for OR for debridement and possible amputation on 3/10/2021  - was on vancomycin 1g IV Q12H, cefepime 2g IV Q8H, and metronidazole 500mg IV Q8H  - f/u blood cultures  - f/u MRSA nares= NEGATIVE  - As per ID hold abx until debridement/amuptuation  - f/u bone histopathology and cultures   - VA Duplex Low Ext Arterial: Minimal stenosis of the mid posterior tibial artery          #Diabetes mellitus type 2  - Hemoglobin A1c 3/13/2021  - Patient takes insulin 70/30 50 units in AM and 40 units in PM, as well as other PO medications  - Will resume insulin regimen from previous admission  - Start insulin glargine 33 units SQ QHS  - Start insulin lispro 8 units SQ TID  - Insulin sliding scale coverage  - Monitor fingerstick glucose  - Repeat A1c in AM     #Hypertension, uncontrolled  - Continue with nifedipine 60mg PO once daily and lisinopril 40mg PO once daily     #Peripheral vascular disease  - Continue with atorvastatin 40mg PO at bedtime and aspirin 81mg PO once daily     #Hyperlipidemia  - Continue with atorvastatin 40mg PO at bedtime     #Misc  - DVT Prophylaxis: Lovenox 40mg SQ once daily   - GI Prophylaxis: pantoprazole 40mg PO once daily   - Diet: DASH/TLC, consistent carbohydrate  - Activity: ambulate as tolerated, WBAT to left lower extremity  - Code Status: Full Code  Dispo: d/c after OR after debridement and possible amputation on Thursday 
Patient is a 72yo male with PMH of insulin-dependent diabetes mellitus complicated by diabetic foot ulcers s/p amputation of half of left great toe, entire right great toe, right 3rd toe, right 4th toe, peripheral vascular disease, hypertension, hyperlipidemia, GERD, and depression who was instructed to come to the ED by his podiatrist Marko Bhatia for osteomyelitis of the left 2nd toe with possible amputation.      #Gangrene of left 2nd toe  - No sepsis present on admission (WBC 9, afebrile, HR >90, no tachypnea)  - Podiatry consult appreciated  - Wound care per podiatry  - Given history of MDRO in past requiring IV ertapenem on discharge, will consult ID  - Had multiple debridements and amputations in the past, wound infections with multiple organisms (enterobacter, stenotrophomonas pseudomonas, group B strep, corynebacterium, E. coli)  - No evidence of soft tissue infection on examination  - MRI findings may be due to chronic inflammation  - s/p POD 2 left toe amputation/tenotomy/percutaneous needle biopsy   - patient is cleared for discharge as per podiatry and to follow up with  at [address] [place] [address] Podiatry Clinic a week post discharge;  - As per ID patient cleared to go home on augmentin 875 mg BID x 7 days   - Patient pending discharge yesterday as bilateral surgical shoes was delivered late,  today morning creatinine uptrending to 1.6 -> will give 1 L NS and recheck BMP -> if improving will consider d/c       #TYLER 2/2 to prerenal etiology poor po intake  - baseline creat fluctuates to 1-1.5  - recent creat today morning 1.6  - currently receiving 1 L of NS -> will check BMP and if stable can be d/c     #Diabetes mellitus type 2  - Hemoglobin A1c 3/13/2021  - Patient takes insulin 70/30 50 units in AM and 40 units in PM, as well as other PO medications  - Will resume insulin regimen from previous admission  - Start insulin glargine 33 units SQ QHS  - Start insulin lispro 8 units SQ TID  - Insulin sliding scale coverage  - Monitor fingerstick glucose  - Repeat A1c in AM     #Hypertension, uncontrolled  - Continue with nifedipine 60mg PO once daily and lisinopril 40mg PO once daily     #Peripheral vascular disease  - Continue with atorvastatin 40mg PO at bedtime and aspirin 81mg PO once daily     #Hyperlipidemia  - Continue with atorvastatin 40mg PO at bedtime     #Misc  - DVT Prophylaxis: Lovenox 40mg SQ once daily   - GI Prophylaxis: pantoprazole 40mg PO once daily   - Diet: DASH/TLC, consistent carbohydrate  - Activity: ambulate as tolerated, WBAT to left lower extremity  - Code Status: Full Code  Dispo: d/c after repeat BMP stable 
sammy is a 72yo male with PMH of insulin-dependent diabetes mellitus complicated by diabetic foot ulcers s/p amputation of half of left great toe, entire right great toe, right 3rd toe, right 4th toe, peripheral vascular disease, hypertension, hyperlipidemia, GERD, and depression who was instructed to come to the ED by his podiatrist Marko Bhatia for osteomyelitis of the left 2nd toe with possible amputation.      #Gangrene of left 2nd toe  - No sepsis present on admission (WBC 9, afebrile, HR >90, no tachypnea)  - Podiatry consult appreciated  - Wound care per podiatry  - Given history of MDRO in past requiring IV ertapenem on discharge, will consult ID  - Patient scheduled for OR for debridement and possible amputation on 3/11/2021  - on vancomycin 1g IV Q12H, cefepime 2g IV Q8H, and metronidazole 500mg IV Q8H  - Get vancomycin trough before 4th dose (recent creat 1.2)  - f/u blood cultures  - f/u MRSA nares  - Discontinue vancomycin if MRSA nares negative  - f/u ID recommendations     #Diabetes mellitus type 2  - Hemoglobin A1c 3/13/2021  - Patient takes insulin 70/30 50 units in AM and 40 units in PM, as well as other PO medications  - Will resume insulin regimen from previous admission  - Start insulin glargine 33 units SQ QHS  - Start insulin lispro 8 units SQ TID  - Insulin sliding scale coverage  - Monitor fingerstick glucose  - Repeat A1c in AM     #Hypertension, uncontrolled  - Continue with nifedipine 60mg PO once daily and lisinopril 40mg PO once daily     #Peripheral vascular disease  - Continue with atorvastatin 40mg PO at bedtime and aspirin 81mg PO once daily     #Hyperlipidemia  - Continue with atorvastatin 40mg PO at bedtime     #Misc  - DVT Prophylaxis: Lovenox 40mg SQ once daily   - GI Prophylaxis: pantoprazole 40mg PO once daily   - Diet: DASH/TLC, consistent carbohydrate  - Activity: ambulate as tolerated, WBAT to left lower extremity  - Code Status: Full Code  Dispo: d/c after OR after debridement and possible amputation on Thursday 
· Assessment      71 M with PMH of insulin-dependent diabetes mellitus complicated by diabetic foot ulcers s/p amputation of half of left 1st toe, entire right 1st toe, right 3rd toe, right 4th toe, peripheral vascular disease, hypertension, hyperlipidemia, GERD, and depression who was instructed to come to the ED by his podiatrist Marko Bhatia for osteomyelitis of the left 2nd toe.  IMPRESSION;  Left foot 2nd toe with ischemic gangrenous changes with chronic underlying OM  No cellulitis  - MRI foot on 3/3:   1. Status post partial second toe amputation with recurrent osteomyelitis at the middle phalanx and proximal phalanx with PIP septic arthritis  2. Nonspecific small second MTP joint effusion without erosions, likely represents nonspecific synovitis  3. Status post great toe partial amputation with minimal osteitis, nonspecific. Correlate clinically for developing ulcer  - Had multiple debridements and amputations in the past, wound infections with multiple organisms (enterobacter, stenotrophomonas pseudomonas, group B strep, corynebacterium, E. coli)    RECOMMENDATIONS;  Deep tissue cultures       
· Assessment	  71 M with PMH of insulin-dependent diabetes mellitus complicated by diabetic foot ulcers s/p amputation of half of left 1st toe, entire right 1st toe, right 3rd toe, right 4th toe, peripheral vascular disease, hypertension, hyperlipidemia, GERD, and depression who was instructed to come to the ED by his podiatrist Marko Bhatia for osteomyelitis of the left 2nd toe.  IMPRESSION;  Left foot 2nd toe with ischemic gangrenous changes with chronic underlying OM  No cellulitis  - MRI foot on 3/3:   1. Status post partial second toe amputation with recurrent osteomyelitis at the middle phalanx and proximal phalanx with PIP septic arthritis  2. Nonspecific small second MTP joint effusion without erosions, likely represents nonspecific synovitis  3. Status post great toe partial amputation with minimal osteitis, nonspecific. Correlate clinically for developing ulcer  - Had multiple debridements and amputations in the past, wound infections with multiple organisms (enterobacter, stenotrophomonas pseudomonas, group B strep, corynebacterium, E. coli)  S/p amputation of toe 3/9  Tissue : no PMNs, no org    RECOMMENDATIONS;  po Augmentin 875 mg q12h for 7 days  recall prn please

## 2021-03-12 NOTE — PROGRESS NOTE ADULT - REASON FOR ADMISSION
diabetic foot ulcer

## 2021-03-12 NOTE — PROGRESS NOTE ADULT - SUBJECTIVE AND OBJECTIVE BOX
----------Daily Progress Note----------    HISTORY OF PRESENT ILLNESS:  Patient is a 71y old Male who presents with a chief complaint of diabetic foot ulcer (09 Mar 2021 12:28)    Currently admitted to medicine with the primary diagnosis of Osteomyelitis of left foot, unspecified type       Today is hospital day 2d.     INTERVAL HOSPITAL COURSE / OVERNIGHT EVENTS:    Patient was examined and seen at bedside. This morning he is resting comfortably in bed and reports no new issues or overnight events.     Review of Systems: Otherwise unremarkable     <<<<<PAST MEDICAL & SURGICAL HISTORY>>>>>  Diabetes    GERD (gastroesophageal reflux disease)    Depression    Diabetic foot ulcer    Dyslipidemia    HTN (hypertension)    DM (diabetes mellitus)  12/27/18 hgb aic  11.2    History of amputation of toe  LT -partial 1st toe    Toe amputation status, right  (2008)      ALLERGIES  No Known Allergies    MEDICATIONS  STANDING MEDICATIONS  aspirin enteric coated 81 milliGRAM(s) Oral daily  atorvastatin 40 milliGRAM(s) Oral at bedtime  cefepime   IVPB 2000 milliGRAM(s) IV Intermittent every 8 hours  enoxaparin Injectable 40 milliGRAM(s) SubCutaneous daily  insulin glargine Injectable (LANTUS) 33 Unit(s) SubCutaneous at bedtime  insulin lispro (ADMELOG) corrective regimen sliding scale   SubCutaneous three times a day before meals  insulin lispro Injectable (ADMELOG) 8 Unit(s) SubCutaneous three times a day before meals  lactobacillus acidophilus 1 Tablet(s) Oral daily  lisinopril 40 milliGRAM(s) Oral daily  metroNIDAZOLE  IVPB 500 milliGRAM(s) IV Intermittent every 8 hours  NIFEdipine XL 60 milliGRAM(s) Oral daily  pantoprazole    Tablet 40 milliGRAM(s) Oral before breakfast  vancomycin  IVPB 1000 milliGRAM(s) IV Intermittent every 12 hours    PRN MEDICATIONS    VITALS:  T(F): 97.4  HR: 68  BP: 136/63  RR: 17  SpO2: 96%    <<<<<LABS>>>>>                        11.7   4.88  )-----------( 173      ( 10 Mar 2021 06:40 )             36.2     03-10    142  |  108  |  27<H>  ----------------------------<  82  4.7   |  25  |  1.1    Ca    8.8      10 Mar 2021 06:40  Mg     2.3     03-10    TPro  5.8<L>  /  Alb  3.7  /  TBili  0.5  /  DBili  x   /  AST  21  /  ALT  20  /  AlkPhos  72  03-10    PT/INR - ( 08 Mar 2021 14:25 )   PT: 11.20 sec;   INR: 0.97 ratio         PTT - ( 08 Mar 2021 14:25 )  PTT:33.7 sec          Culture - Blood (collected 08 Mar 2021 14:25)  Source: .Blood Blood-Peripheral  Preliminary Report (09 Mar 2021 22:02):    No growth to date.    Culture - Blood (collected 08 Mar 2021 14:25)  Source: .Blood Blood-Peripheral  Preliminary Report (09 Mar 2021 22:02):    No growth to date.    [id number]        <<<<<RADIOLOGY>>>>>    CONSTITUTIONAL: No acute distress, well-developed, well-groomed, AAOx3  HEAD: Atraumatic, normocephalic  EYES: EOM intact, PERRLA, conjunctiva and sclera clear  ENT: Supple, no masses, no thyromegaly, no bruits, no JVD; moist mucous membranes  PULMONARY: Clear to auscultation bilaterally; no wheezes, rales, or rhonchi  CARDIOVASCULAR: Regular rate and rhythm; no murmurs, rubs, or gallops  GASTROINTESTINAL: Soft, non-tender, non-distended; bowel sounds present  MUSCULOSKELETAL: diminished peripheral pulses; no clubbing, no cyanosis, no edema  NEUROLOGY: decreased pin-prick sensation in bilateral lower extremities  SKIN: gangrene of 2nd left toe, amputations of multiple toes bilaterally      -----------------------------------------------------------------------------------------------------------------------------------------------------------------------------------------------
----------Daily Progress Note----------    HISTORY OF PRESENT ILLNESS:  Patient is a 71y old Male who presents with a chief complaint of diabetic foot ulcer (11 Mar 2021 15:21)    Currently admitted to medicine with the primary diagnosis of Osteomyelitis of left foot, unspecified type       Today is hospital day 4d.     INTERVAL HOSPITAL COURSE / OVERNIGHT EVENTS:    Patient was examined and seen at bedside. This morning he is resting comfortably in bed and reports no new issues or overnight events. Patient was anticipated for discharge yesterday however was waiting for bilateral surgical shoes to be delivered to patient at bedside.     Review of Systems: Otherwise unremarkable     <<<<<PAST MEDICAL & SURGICAL HISTORY>>>>>  Diabetes    GERD (gastroesophageal reflux disease)    Depression    Diabetic foot ulcer    Dyslipidemia    HTN (hypertension)    DM (diabetes mellitus)  12/27/18 hgb aic  11.2    History of amputation of toe  LT -partial 1st toe    Toe amputation status, right  (2008)      ALLERGIES  No Known Allergies    MEDICATIONS  STANDING MEDICATIONS  amoxicillin  875 milliGRAM(s)/clavulanate 1 Tablet(s) Oral two times a day  aspirin enteric coated 81 milliGRAM(s) Oral daily  atorvastatin 40 milliGRAM(s) Oral at bedtime  enoxaparin Injectable 40 milliGRAM(s) SubCutaneous daily  insulin glargine Injectable (LANTUS) 33 Unit(s) SubCutaneous at bedtime  insulin lispro (ADMELOG) corrective regimen sliding scale   SubCutaneous three times a day before meals  insulin lispro Injectable (ADMELOG) 8 Unit(s) SubCutaneous three times a day before meals  lactobacillus acidophilus 1 Tablet(s) Oral daily  lisinopril 40 milliGRAM(s) Oral daily  NIFEdipine XL 60 milliGRAM(s) Oral daily  pantoprazole    Tablet 40 milliGRAM(s) Oral before breakfast    PRN MEDICATIONS    VITALS:  T(F): 97.6  HR: 76  BP: 131/59  RR: 18  SpO2: --    <<<<<LABS>>>>>                        11.9   6.88  )-----------( 100      ( 12 Mar 2021 06:38 )             37.4     03-12    138  |  107  |  42<H>  ----------------------------<  318<H>  5.6<H>   |  21  |  1.6<H>    Ca    8.5      12 Mar 2021 06:38  Mg     2.2     03-12                Culture - Tissue with Gram Stain (collected 10 Mar 2021 21:36)  Source: .Tissue None  Gram Stain (11 Mar 2021 04:59):    No polymorphonuclear cells seen per low power field    No organisms seen per oil power field  Preliminary Report (12 Mar 2021 09:15):    No growth    [id number]        <<<<<RADIOLOGY>>>>>    <<<<<PHYSICAL EXAM>>>>>  GENERAL: Well developed, well nourished and in no acute distress. Resting comfortably in bed.  HEENT: Normocephalic, atraumatic, mucous membranes moist, EOMI, PERRLA, bilateral sclera anicteric, no conjunctival injection  Neck: Supple, non-tender, no lymphadenopathy.  PULMONARY: Clear to auscultation bilaterally. No rales, rhonchi, or wheezing.  CARDIOVASCULAR: Regular rate and rhythm, S1-S2, no murmurs    -----------------------------------------------------------------------------------------------------------------------------------------------------------------------------------------------
Podiatry Progress Note    Subjective:   JOSÉ MANUEL PORTER is a pleasant well-nourished, well developed 71y Male in no acute distress, alert awake, and oriented to person, place and time.  Patient is a 71y old  Male who presents with a chief complaint of diabetic foot ulcer (08 Mar 2021 20:17)  Seen by bedside w/ attending; informed about sx procedure on Thursday 3/11;    PAST MEDICAL & SURGICAL HISTORY:  Diabetes  GERD (gastroesophageal reflux disease)  Depression  Diabetic foot ulcer  Dyslipidemia  HTN (hypertension)  DM (diabetes mellitus)  12/27/18 hgb aic  11.2  History of amputation of toe  LT -partial 1st toe  Toe amputation status, right  (2008)    Objective:  Vital Signs Last 24 Hrs  T(C): 36.3 (09 Mar 2021 03:58), Max: 36.8 (08 Mar 2021 13:57)  T(F): 97.3 (09 Mar 2021 03:58), Max: 98.3 (08 Mar 2021 13:57)  HR: 85 (09 Mar 2021 03:58) (75 - 92)  BP: 122/57 (09 Mar 2021 03:58) (122/57 - 194/88)  BP(mean): --  RR: 18 (09 Mar 2021 03:58) (18 - 20)  SpO2: 98% (08 Mar 2021 19:27) (98% - 99%)                        12.7   9.43  )-----------( 199      ( 08 Mar 2021 14:25 )             39.2                 03-08    140  |  104  |  27<H>  ----------------------------<  202<H>  5.2<H>   |  23  |  1.5    Ca    9.5      08 Mar 2021 14:25    TPro  7.1  /  Alb  4.6  /  TBili  0.4  /  DBili  x   /  AST  24  /  ALT  29  /  AlkPhos  99  03-08    Physical Exam - Lower Extremity Focused:   Derm:   Left 2nd digit gangrene;  Vascular: DP and PT Pulses Diminished;  Neuro: light touch diminished    Assessment:  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 All Questions and Concerns Addressed and Answered  Discussed diagnosis and treatment with patient  Wound Flushed w/ normal saline; Betadine soaked 4x4 / DSD / Kerlix;  Local Wound Care; As Stated Above; q24  XR Imaging Left foot; will follow up with result;   Sx scheduled on Thursday 3/11 @ 4:30PM; excisional debridement of soft tissue and bone with possible 2nd digit amputation of left foot;  Request medical clearance;  Request pre-lab optimization and OR stratification;  NPO @ [facility] on Wed 3/10;   Weight bearing status; WBAT to left foot;   Discussed Plan w/ Dr. Bhatia    Podiatry   
----------Daily Progress Note----------    HISTORY OF PRESENT ILLNESS:  Patient is a 71y old Male who presents with a chief complaint of diabetic foot ulcer (09 Mar 2021 07:29)    Currently admitted to medicine with the primary diagnosis of Osteomyelitis of left foot, unspecified type    Today is hospital day 1d.     INTERVAL HOSPITAL COURSE / OVERNIGHT EVENTS:    Patient was examined and seen at bedside. This morning he is resting comfortably in bed and reports no new issues or overnight events.     Review of Systems: Otherwise unremarkable     <<<<<PAST MEDICAL & SURGICAL HISTORY>>>>>  Diabetes    GERD (gastroesophageal reflux disease)    Depression    Diabetic foot ulcer    Dyslipidemia    HTN (hypertension)    DM (diabetes mellitus)  12/27/18 hgb aic  11.2    History of amputation of toe  LT -partial 1st toe    Toe amputation status, right  (2008)      ALLERGIES  No Known Allergies    MEDICATIONS  STANDING MEDICATIONS  aspirin enteric coated 81 milliGRAM(s) Oral daily  atorvastatin 40 milliGRAM(s) Oral at bedtime  cefepime   IVPB 2000 milliGRAM(s) IV Intermittent every 8 hours  enoxaparin Injectable 40 milliGRAM(s) SubCutaneous daily  insulin glargine Injectable (LANTUS) 33 Unit(s) SubCutaneous at bedtime  insulin lispro (ADMELOG) corrective regimen sliding scale   SubCutaneous three times a day before meals  insulin lispro Injectable (ADMELOG) 8 Unit(s) SubCutaneous three times a day before meals  lactobacillus acidophilus 1 Tablet(s) Oral daily  lisinopril 40 milliGRAM(s) Oral daily  metroNIDAZOLE  IVPB 500 milliGRAM(s) IV Intermittent every 8 hours  NIFEdipine XL 60 milliGRAM(s) Oral daily  pantoprazole    Tablet 40 milliGRAM(s) Oral before breakfast  vancomycin  IVPB 1000 milliGRAM(s) IV Intermittent every 12 hours    PRN MEDICATIONS    VITALS:  T(F): 97.3  HR: 85  BP: 122/57  RR: 18  SpO2: 96%    <<<<<LABS>>>>>                        13.1   9.27  )-----------( 158      ( 09 Mar 2021 06:16 )             40.6     03-09    137  |  102  |  23<H>  ----------------------------<  220<H>  4.6   |  22  |  1.2    Ca    9.0      09 Mar 2021 06:16  Mg     2.3     03-09    TPro  7.1  /  Alb  4.6  /  TBili  0.4  /  DBili  x   /  AST  24  /  ALT  29  /  AlkPhos  99  03-08    PT/INR - ( 08 Mar 2021 14:25 )   PT: 11.20 sec;   INR: 0.97 ratio         PTT - ( 08 Mar 2021 14:25 )  PTT:33.7 sec        [id number]        <<<<<RADIOLOGY>>>>>    CONSTITUTIONAL: No acute distress, well-developed, well-groomed, AAOx3  HEAD: Atraumatic, normocephalic  EYES: EOM intact, PERRLA, conjunctiva and sclera clear  ENT: Supple, no masses, no thyromegaly, no bruits, no JVD; moist mucous membranes  PULMONARY: Clear to auscultation bilaterally; no wheezes, rales, or rhonchi  CARDIOVASCULAR: Regular rate and rhythm; no murmurs, rubs, or gallops  GASTROINTESTINAL: Soft, non-tender, non-distended; bowel sounds present  MUSCULOSKELETAL: diminished peripheral pulses; no clubbing, no cyanosis, no edema  NEUROLOGY: decreased pin-prick sensation in bilateral lower extremities  SKIN: gangrene of 2nd left toe, amputations of multiple toes bilaterally      -----------------------------------------------------------------------------------------------------------------------------------------------------------------------------------------------
----------Daily Progress Note----------    HISTORY OF PRESENT ILLNESS:  Patient is a 71y old Male who presents with a chief complaint of diabetic foot ulcer (11 Mar 2021 12:56)    Currently admitted to medicine with the primary diagnosis of Osteomyelitis of left foot, unspecified type       Today is hospital day 3d.     INTERVAL HOSPITAL COURSE / OVERNIGHT EVENTS:    Patient was examined and seen at bedside. Patient POD 1 s/p left toe amputation/tenotomy, doing well. This morning he is resting comfortably in bed and reports no new issues or overnight events.     Review of Systems: Otherwise unremarkable     <<<<<PAST MEDICAL & SURGICAL HISTORY>>>>>  Diabetes    GERD (gastroesophageal reflux disease)    Depression    Diabetic foot ulcer    Dyslipidemia    HTN (hypertension)    DM (diabetes mellitus)  12/27/18 hgb aic  11.2    History of amputation of toe  LT -partial 1st toe    Toe amputation status, right  (2008)      ALLERGIES  No Known Allergies    MEDICATIONS  STANDING MEDICATIONS  aspirin enteric coated 81 milliGRAM(s) Oral daily  atorvastatin 40 milliGRAM(s) Oral at bedtime  enoxaparin Injectable 40 milliGRAM(s) SubCutaneous daily  insulin glargine Injectable (LANTUS) 33 Unit(s) SubCutaneous at bedtime  insulin lispro (ADMELOG) corrective regimen sliding scale   SubCutaneous three times a day before meals  insulin lispro Injectable (ADMELOG) 8 Unit(s) SubCutaneous three times a day before meals  lactobacillus acidophilus 1 Tablet(s) Oral daily  lisinopril 40 milliGRAM(s) Oral daily  NIFEdipine XL 60 milliGRAM(s) Oral daily  pantoprazole    Tablet 40 milliGRAM(s) Oral before breakfast    PRN MEDICATIONS    VITALS:  T(F): 96.9  HR: 78  BP: 136/74  RR: 18  SpO2: 99%    <<<<<LABS>>>>>                        13.0   8.47  )-----------( 209      ( 11 Mar 2021 07:04 )             39.2     03-11    142  |  104  |  31<H>  ----------------------------<  292<H>  5.4<H>   |  24  |  1.3    Ca    9.5      11 Mar 2021 07:04  Mg     2.3     03-11    TPro  5.8<L>  /  Alb  3.7  /  TBili  0.5  /  DBili  x   /  AST  21  /  ALT  20  /  AlkPhos  72  03-10              Culture - Tissue with Gram Stain (collected 10 Mar 2021 21:36)  Source: .Tissue None  Gram Stain (11 Mar 2021 04:59):    No polymorphonuclear cells seen per low power field    No organisms seen per oil power field    [id number]        <<<<<RADIOLOGY>>>>>    <<<<<PHYSICAL EXAM>>>>>  GENERAL: Well developed, well nourished and in no acute distress. Resting comfortably in bed.  HEENT: Normocephalic, atraumatic, mucous membranes moist, EOMI, PERRLA, bilateral sclera anicteric, no conjunctival injection  Neck: Supple, non-tender, no lymphadenopathy.  PULMONARY: Clear to auscultation bilaterally. No rales, rhonchi, or wheezing.  CARDIOVASCULAR: Regular rate and rhythm, S1-S2, no murmurs  GASTROINTESTINAL: Soft, non-tender, non-distended, no guarding.  RENAL: No CVA tenderness.  SKIN/EXTREMITIES: dressing applied on both lower extremities   NEUROLOGIC/MUSCULOSKELETAL: AOx4, grossly moving all extremities, no focal deficits.      -----------------------------------------------------------------------------------------------------------------------------------------------------------------------------------------------
Podiatry Progress Note    Subjective:   JOSÉ MANUEL PORTER is a pleasant well-nourished, well developed 71y Male in no acute distress, alert awake, and oriented to person, place and time.  Patient is a 71y old  Male who presents with a chief complaint of diabetic foot ulcer (10 Mar 2021 11:01)  Seen by bedside; evaluated left foot sx wound site;     PAST MEDICAL & SURGICAL HISTORY:  Diabetes  GERD (gastroesophageal reflux disease)  Depression  Diabetic foot ulcer  Dyslipidemia  HTN (hypertension)  DM (diabetes mellitus)  12/27/18 hgb aic  11.2  History of amputation of toe  LT -partial 1st toe  Toe amputation status, right  (2008)    Objective:  Vital Signs Last 24 Hrs  T(C): 35.8 (11 Mar 2021 04:45), Max: 36.6 (10 Mar 2021 14:30)  T(F): 96.5 (11 Mar 2021 04:45), Max: 97.9 (10 Mar 2021 14:30)  HR: 76 (11 Mar 2021 04:45) (76 - 109)  BP: 129/60 (11 Mar 2021 04:45) (121/62 - 185/87)  BP(mean): --  RR: 20 (11 Mar 2021 04:45) (14 - 20)  SpO2: 99% (10 Mar 2021 18:48) (96% - 100%)                        11.7   4.88  )-----------( 173      ( 10 Mar 2021 06:40 )             36.2                 03-10    142  |  108  |  27<H>  ----------------------------<  82  4.7   |  25  |  1.1    Ca    8.8      10 Mar 2021 06:40  Mg     2.3     03-10    TPro  5.8<L>  /  Alb  3.7  /  TBili  0.5  /  DBili  x   /  AST  21  /  ALT  20  /  AlkPhos  72  03-10    Culture - Tissue with Gram Stain (collected 03-10-21 @ 21:36)  Source: .Tissue None  Gram Stain (03-11-21 @ 04:59):    No polymorphonuclear cells seen per low power field    No organisms seen per oil power field    Culture - Tissue with Gram Stain (collected 03-10-21 @ 21:36)  Source: .Tissue None  Gram Stain (03-11-21 @ 04:59):    No polymorphonuclear cells seen per low power field    No organisms seen per oil power field    Physical Exam - Lower Extremity Focused:   Derm:   Left 2nd digit amputation and 3rd digit flexor tenotomy wound site; no active bleeding, no drainage, no malodor; suture nicely intact without any signs of ischemia; stable;   Vascular: DP and PT Pulses palpable  Neuro: light touch diminished     Assessment:   s/p 2nd digit amputation with closure and 3rd digit flexor tenotomy of left foot 3/10;     Plan:  Chart reviewed and Patient evaluated. All Questions and Concerns Addressed and Answered  Discussed diagnosis and treatment with patient  Wound Flushed w/ normal saline; Xeroform / DSD / Kerlix / ACE;   Continue Local Wound Care; As Stated Above; q24  Patient is stable from podiatry standpoint; follow up as an outpatient to Dr. Bhatia at [address] [place] [address] [facility] a week post discharge;  Ordered bilateral surgical shoe to be applied on bilateral feet prior to discharge;  Weight bearing status; WBAT w/ heel touch on left foot;  Discussed Plan w/ attending;     Podiatry     
  JOSÉ MANUEL PORTER  71y, Male    All available historical data reviewed    OVERNIGHT EVENTS:  no fevers  feels well and has no complaints     ROS:  General: Denies rigors, nightsweats  HEENT: Denies headache, rhinorrhea, sore throat, eye pain  CV: Denies CP, palpitations  PULM: Denies wheezing, hemoptysis  GI: Denies hematemesis, hematochezia, melena  : Denies discharge, hematuria  MSK: Denies arthralgias, myalgias  SKIN: Denies rash, lesions  NEURO: Denies paresthesias, weakness  PSYCH: Denies depression, anxiety    VITALS:  T(F): 97.4, Max: 97.7 (03-09-21 @ 13:52)  HR: 68  BP: 136/63  RR: 17Vital Signs Last 24 Hrs  T(C): 36.3 (10 Mar 2021 05:50), Max: 36.5 (09 Mar 2021 13:52)  T(F): 97.4 (10 Mar 2021 05:50), Max: 97.7 (09 Mar 2021 13:52)  HR: 68 (10 Mar 2021 05:50) (66 - 76)  BP: 136/63 (10 Mar 2021 05:50) (104/59 - 145/69)  BP(mean): --  RR: 17 (10 Mar 2021 05:50) (17 - 18)  SpO2: 96% (10 Mar 2021 05:50) (96% - 96%)    TESTS & MEASUREMENTS:                        11.7   4.88  )-----------( 173      ( 10 Mar 2021 06:40 )             36.2     03-10    142  |  108  |  27<H>  ----------------------------<  82  4.7   |  25  |  1.1    Ca    8.8      10 Mar 2021 06:40  Mg     2.3     03-10    TPro  5.8<L>  /  Alb  3.7  /  TBili  0.5  /  DBili  x   /  AST  21  /  ALT  20  /  AlkPhos  72  03-10    LIVER FUNCTIONS - ( 10 Mar 2021 06:40 )  Alb: 3.7 g/dL / Pro: 5.8 g/dL / ALK PHOS: 72 U/L / ALT: 20 U/L / AST: 21 U/L / GGT: x             Culture - Blood (collected 03-08-21 @ 14:25)  Source: .Blood Blood-Peripheral  Preliminary Report (03-09-21 @ 22:02):    No growth to date.    Culture - Blood (collected 03-08-21 @ 14:25)  Source: .Blood Blood-Peripheral  Preliminary Report (03-09-21 @ 22:02):    No growth to date.            RADIOLOGY & ADDITIONAL TESTS:  Personal review of radiological diagnostics performed  Echo and EKG results noted when applicable.     MEDICATIONS:  aspirin enteric coated 81 milliGRAM(s) Oral daily  atorvastatin 40 milliGRAM(s) Oral at bedtime  cefepime   IVPB 2000 milliGRAM(s) IV Intermittent every 8 hours  enoxaparin Injectable 40 milliGRAM(s) SubCutaneous daily  insulin glargine Injectable (LANTUS) 33 Unit(s) SubCutaneous at bedtime  insulin lispro (ADMELOG) corrective regimen sliding scale   SubCutaneous three times a day before meals  insulin lispro Injectable (ADMELOG) 8 Unit(s) SubCutaneous three times a day before meals  lactobacillus acidophilus 1 Tablet(s) Oral daily  lisinopril 40 milliGRAM(s) Oral daily  metroNIDAZOLE  IVPB 500 milliGRAM(s) IV Intermittent every 8 hours  NIFEdipine XL 60 milliGRAM(s) Oral daily  pantoprazole    Tablet 40 milliGRAM(s) Oral before breakfast  vancomycin  IVPB 1000 milliGRAM(s) IV Intermittent every 12 hours      ANTIBIOTICS:  cefepime   IVPB 2000 milliGRAM(s) IV Intermittent every 8 hours  metroNIDAZOLE  IVPB 500 milliGRAM(s) IV Intermittent every 8 hours  vancomycin  IVPB 1000 milliGRAM(s) IV Intermittent every 12 hours    
  JOSÉ MANUEL PORTER  71y, Male    All available historical data reviewed    OVERNIGHT EVENTS:  no fevers  feels well and has no complaints  Percutaneous needle biopsy of metatarsal bone 10-Mar-2021 16:02:19  Royal Bhatia  Tenotomy, flexor, toe, open 10-Mar-2021 16:01:57  Royal Bhatia  Amputation, toe, left, single     ROS:  General: Denies rigors, nightsweats  HEENT: Denies headache, rhinorrhea, sore throat, eye pain  CV: Denies CP, palpitations  PULM: Denies wheezing, hemoptysis  GI: Denies hematemesis, hematochezia, melena  : Denies discharge, hematuria  MSK: Denies arthralgias, myalgias  SKIN: Denies rash, lesions  NEURO: Denies paresthesias, weakness  PSYCH: Denies depression, anxiety    VITALS:  T(F): 96.5, Max: 97.9 (03-10-21 @ 14:30)  HR: 76  BP: 129/60  RR: 20Vital Signs Last 24 Hrs  T(C): 35.8 (11 Mar 2021 04:45), Max: 36.6 (10 Mar 2021 14:30)  T(F): 96.5 (11 Mar 2021 04:45), Max: 97.9 (10 Mar 2021 14:30)  HR: 76 (11 Mar 2021 04:45) (76 - 109)  BP: 129/60 (11 Mar 2021 04:45) (121/62 - 185/87)  BP(mean): --  RR: 20 (11 Mar 2021 04:45) (14 - 20)  SpO2: 99% (10 Mar 2021 18:48) (96% - 100%)    TESTS & MEASUREMENTS:                        13.0   8.47  )-----------( 209      ( 11 Mar 2021 07:04 )             39.2     03-11    142  |  104  |  31<H>  ----------------------------<  292<H>  5.4<H>   |  24  |  1.3    Ca    9.5      11 Mar 2021 07:04  Mg     2.3     03-11    TPro  5.8<L>  /  Alb  3.7  /  TBili  0.5  /  DBili  x   /  AST  21  /  ALT  20  /  AlkPhos  72  03-10    LIVER FUNCTIONS - ( 10 Mar 2021 06:40 )  Alb: 3.7 g/dL / Pro: 5.8 g/dL / ALK PHOS: 72 U/L / ALT: 20 U/L / AST: 21 U/L / GGT: x             Culture - Tissue with Gram Stain (collected 03-10-21 @ 21:36)  Source: .Tissue None  Gram Stain (03-11-21 @ 04:59):    No polymorphonuclear cells seen per low power field    No organisms seen per oil power field    Culture - Blood (collected 03-08-21 @ 14:25)  Source: .Blood Blood-Peripheral  Preliminary Report (03-09-21 @ 22:02):    No growth to date.    Culture - Blood (collected 03-08-21 @ 14:25)  Source: .Blood Blood-Peripheral  Preliminary Report (03-09-21 @ 22:02):    No growth to date.            RADIOLOGY & ADDITIONAL TESTS:  Personal review of radiological diagnostics performed  Echo and EKG results noted when applicable.     MEDICATIONS:  aspirin enteric coated 81 milliGRAM(s) Oral daily  atorvastatin 40 milliGRAM(s) Oral at bedtime  cefepime   IVPB 2000 milliGRAM(s) IV Intermittent every 8 hours  enoxaparin Injectable 40 milliGRAM(s) SubCutaneous daily  insulin glargine Injectable (LANTUS) 33 Unit(s) SubCutaneous at bedtime  insulin lispro (ADMELOG) corrective regimen sliding scale   SubCutaneous three times a day before meals  insulin lispro Injectable (ADMELOG) 8 Unit(s) SubCutaneous three times a day before meals  lactobacillus acidophilus 1 Tablet(s) Oral daily  lisinopril 40 milliGRAM(s) Oral daily  metroNIDAZOLE  IVPB 500 milliGRAM(s) IV Intermittent every 8 hours  NIFEdipine XL 60 milliGRAM(s) Oral daily  pantoprazole    Tablet 40 milliGRAM(s) Oral before breakfast      ANTIBIOTICS:  cefepime   IVPB 2000 milliGRAM(s) IV Intermittent every 8 hours  metroNIDAZOLE  IVPB 500 milliGRAM(s) IV Intermittent every 8 hours

## 2021-03-12 NOTE — PROGRESS NOTE ADULT - PROVIDER SPECIALTY LIST ADULT
Internal Medicine
Podiatry
Podiatry
Internal Medicine
Infectious Disease
Infectious Disease

## 2021-03-16 LAB
CULTURE RESULTS: NO GROWTH — SIGNIFICANT CHANGE UP
SPECIMEN SOURCE: SIGNIFICANT CHANGE UP
SURGICAL PATHOLOGY STUDY: SIGNIFICANT CHANGE UP

## 2021-03-19 ENCOUNTER — APPOINTMENT (OUTPATIENT)
Dept: PODIATRY | Facility: CLINIC | Age: 71
End: 2021-03-19
Payer: MEDICARE

## 2021-03-19 ENCOUNTER — OUTPATIENT (OUTPATIENT)
Dept: OUTPATIENT SERVICES | Facility: HOSPITAL | Age: 71
LOS: 1 days | Discharge: HOME | End: 2021-03-19

## 2021-03-19 DIAGNOSIS — Z89.429 ACQUIRED ABSENCE OF OTHER TOE(S), UNSPECIFIED SIDE: Chronic | ICD-10-CM

## 2021-03-19 DIAGNOSIS — Z89.421 ACQUIRED ABSENCE OF OTHER RIGHT TOE(S): Chronic | ICD-10-CM

## 2021-03-19 PROCEDURE — 99024 POSTOP FOLLOW-UP VISIT: CPT

## 2021-03-19 NOTE — PHYSICAL EXAM
[General Appearance - Alert] : alert [General Appearance - In No Acute Distress] : in no acute distress [General Appearance - Well Nourished] : well nourished [General Appearance - Well Developed] : well developed [General Appearance - Well-Appearing] : healthy appearing [] : normal voice and communication [1+] : left foot dorsalis pedis 1+ [Ankle Swelling (On Exam)] : not present [de-identified] : L 2nd digit amputated [de-identified] : local swelling to the second toe w/ a dorsal necrotic patch [FreeTextEntry1] : 2nd toe  [FreeTextEntry2] : 0.5cm x 0.5cm  [TWNoteComboBox1] : Left [TWNoteComboBox4] : None [TWNoteComboBox5] : No [de-identified] : No [de-identified] : Macerated [de-identified] : None [de-identified] : 100% [Diminished Throughout Right Foot] : normal sensation with monofilament testing throughout right foot [Diminished Throughout Left Foot] : normal sensation with monofilament testing throughout left foot

## 2021-03-19 NOTE — HISTORY OF PRESENT ILLNESS
[FreeTextEntry1] : Mr. Ceja is a 71 year old male patient who presents with s/p L 2nd digit amputation; evaluated L foot sx site

## 2021-03-19 NOTE — ASSESSMENT
[FreeTextEntry1] : Assessment: s/p left 2nd digit amputation\par \par Plan:\par -Patient was seen, evaluated and treated with all questions and concerns addressed\par -L 2nd digit amputation site healing as expected; Xeroform / DSD / Kerlix / ACE; q24\par -L hallux dorsum hyperkeratotic soft tissue debridement;\par -Educated patient Left 3rd digit dorsum darkening is not gangrene and stable from podiatry standpoint;\par -Will remove suture when patient comes back next Friday; will follow up with L 3rd digit dorsum discoloration as well\par -F/u in a week

## 2021-03-23 DIAGNOSIS — Z89.411 ACQUIRED ABSENCE OF RIGHT GREAT TOE: ICD-10-CM

## 2021-03-23 DIAGNOSIS — M86.672 OTHER CHRONIC OSTEOMYELITIS, LEFT ANKLE AND FOOT: ICD-10-CM

## 2021-03-23 DIAGNOSIS — E78.5 HYPERLIPIDEMIA, UNSPECIFIED: ICD-10-CM

## 2021-03-23 DIAGNOSIS — Z79.4 LONG TERM (CURRENT) USE OF INSULIN: ICD-10-CM

## 2021-03-23 DIAGNOSIS — M65.872 OTHER SYNOVITIS AND TENOSYNOVITIS, LEFT ANKLE AND FOOT: ICD-10-CM

## 2021-03-23 DIAGNOSIS — Z89.412 ACQUIRED ABSENCE OF LEFT GREAT TOE: ICD-10-CM

## 2021-03-23 DIAGNOSIS — I10 ESSENTIAL (PRIMARY) HYPERTENSION: ICD-10-CM

## 2021-03-23 DIAGNOSIS — E11.69 TYPE 2 DIABETES MELLITUS WITH OTHER SPECIFIED COMPLICATION: ICD-10-CM

## 2021-03-23 DIAGNOSIS — Z79.899 OTHER LONG TERM (CURRENT) DRUG THERAPY: ICD-10-CM

## 2021-03-23 DIAGNOSIS — L97.526 NON-PRESSURE CHRONIC ULCER OF OTHER PART OF LEFT FOOT WITH BONE INVOLVEMENT WITHOUT EVIDENCE OF NECROSIS: ICD-10-CM

## 2021-03-23 DIAGNOSIS — M20.42 OTHER HAMMER TOE(S) (ACQUIRED), LEFT FOOT: ICD-10-CM

## 2021-03-23 DIAGNOSIS — N17.9 ACUTE KIDNEY FAILURE, UNSPECIFIED: ICD-10-CM

## 2021-03-23 DIAGNOSIS — E11.621 TYPE 2 DIABETES MELLITUS WITH FOOT ULCER: ICD-10-CM

## 2021-03-23 DIAGNOSIS — K21.9 GASTRO-ESOPHAGEAL REFLUX DISEASE WITHOUT ESOPHAGITIS: ICD-10-CM

## 2021-03-23 DIAGNOSIS — Z89.421 ACQUIRED ABSENCE OF OTHER RIGHT TOE(S): ICD-10-CM

## 2021-03-23 DIAGNOSIS — Z79.82 LONG TERM (CURRENT) USE OF ASPIRIN: ICD-10-CM

## 2021-03-23 DIAGNOSIS — E87.5 HYPERKALEMIA: ICD-10-CM

## 2021-03-23 DIAGNOSIS — E11.52 TYPE 2 DIABETES MELLITUS WITH DIABETIC PERIPHERAL ANGIOPATHY WITH GANGRENE: ICD-10-CM

## 2021-03-29 ENCOUNTER — APPOINTMENT (OUTPATIENT)
Dept: PODIATRY | Facility: CLINIC | Age: 71
End: 2021-03-29
Payer: MEDICARE

## 2021-03-29 ENCOUNTER — OUTPATIENT (OUTPATIENT)
Dept: OUTPATIENT SERVICES | Facility: HOSPITAL | Age: 71
LOS: 1 days | Discharge: HOME | End: 2021-03-29

## 2021-03-29 DIAGNOSIS — Z89.421 ACQUIRED ABSENCE OF OTHER RIGHT TOE(S): Chronic | ICD-10-CM

## 2021-03-29 DIAGNOSIS — Z89.429 ACQUIRED ABSENCE OF OTHER TOE(S), UNSPECIFIED SIDE: Chronic | ICD-10-CM

## 2021-03-29 PROCEDURE — 99024 POSTOP FOLLOW-UP VISIT: CPT

## 2021-03-30 NOTE — PHYSICAL EXAM
[General Appearance - Alert] : alert [General Appearance - In No Acute Distress] : in no acute distress [General Appearance - Well Nourished] : well nourished [General Appearance - Well Developed] : well developed [General Appearance - Well-Appearing] : healthy appearing [] : normal voice and communication [1+] : left foot dorsalis pedis 1+ [Ankle Swelling (On Exam)] : not present [de-identified] : L 2nd digit amputated [de-identified] : local swelling to the second toe w/ a dorsal necrotic patch [FreeTextEntry1] : 2nd toe  [TWNoteComboBox1] : Left [TWNoteComboBox4] : None [TWNoteComboBox5] : No [de-identified] : No [de-identified] : Normal [de-identified] : None [de-identified] : 100% [Diminished Throughout Right Foot] : normal sensation with monofilament testing throughout right foot [Diminished Throughout Left Foot] : normal sensation with monofilament testing throughout left foot

## 2021-03-30 NOTE — ASSESSMENT
[FreeTextEntry1] : suture removal left 2nd digit, stable\par applied bacitracin dsd kerlix ace\par f/u 1 week\par cont LWC\par

## 2021-04-02 ENCOUNTER — NON-APPOINTMENT (OUTPATIENT)
Age: 71
End: 2021-04-02

## 2021-04-05 ENCOUNTER — APPOINTMENT (OUTPATIENT)
Dept: NUTRITION | Facility: CLINIC | Age: 71
End: 2021-04-05

## 2021-04-08 ENCOUNTER — OUTPATIENT (OUTPATIENT)
Dept: OUTPATIENT SERVICES | Facility: HOSPITAL | Age: 71
LOS: 1 days | Discharge: HOME | End: 2021-04-08

## 2021-04-08 ENCOUNTER — APPOINTMENT (OUTPATIENT)
Dept: PODIATRY | Facility: CLINIC | Age: 71
End: 2021-04-08
Payer: MEDICARE

## 2021-04-08 DIAGNOSIS — Z89.429 ACQUIRED ABSENCE OF OTHER TOE(S), UNSPECIFIED SIDE: Chronic | ICD-10-CM

## 2021-04-08 DIAGNOSIS — Z89.421 ACQUIRED ABSENCE OF OTHER RIGHT TOE(S): Chronic | ICD-10-CM

## 2021-04-08 PROCEDURE — 99024 POSTOP FOLLOW-UP VISIT: CPT

## 2021-04-08 NOTE — ASSESSMENT
[FreeTextEntry1] : surgical site has healed. no wound dressing needed\par recommended OTC toe sleeves

## 2021-04-08 NOTE — PHYSICAL EXAM
[General Appearance - Alert] : alert [General Appearance - In No Acute Distress] : in no acute distress [General Appearance - Well Nourished] : well nourished [General Appearance - Well Developed] : well developed [General Appearance - Well-Appearing] : healthy appearing [] : normal voice and communication [1+] : left foot dorsalis pedis 1+ [Ankle Swelling (On Exam)] : not present [de-identified] : L 2nd digit amputated [de-identified] : local swelling to the second toe w/ a dorsal necrotic patch [FreeTextEntry1] : 2nd toe  [TWNoteComboBox1] : Left [TWNoteComboBox4] : None [TWNoteComboBox5] : No [de-identified] : No [de-identified] : Normal [de-identified] : None [de-identified] : 100% [Diminished Throughout Right Foot] : normal sensation with monofilament testing throughout right foot [Diminished Throughout Left Foot] : normal sensation with monofilament testing throughout left foot

## 2021-04-15 ENCOUNTER — OUTPATIENT (OUTPATIENT)
Dept: OUTPATIENT SERVICES | Facility: HOSPITAL | Age: 71
LOS: 1 days | Discharge: HOME | End: 2021-04-15

## 2021-04-15 ENCOUNTER — APPOINTMENT (OUTPATIENT)
Dept: PODIATRY | Facility: CLINIC | Age: 71
End: 2021-04-15
Payer: MEDICARE

## 2021-04-15 DIAGNOSIS — Z89.429 ACQUIRED ABSENCE OF OTHER TOE(S), UNSPECIFIED SIDE: Chronic | ICD-10-CM

## 2021-04-15 DIAGNOSIS — Z89.421 ACQUIRED ABSENCE OF OTHER RIGHT TOE(S): Chronic | ICD-10-CM

## 2021-04-15 PROCEDURE — 97763 ORTHC/PROSTC MGMT SBSQ ENC: CPT | Mod: GP

## 2021-04-16 DIAGNOSIS — Z89.419 ACQUIRED ABSENCE OF UNSPECIFIED GREAT TOE: ICD-10-CM

## 2021-04-16 DIAGNOSIS — L85.3 XEROSIS CUTIS: ICD-10-CM

## 2021-04-16 DIAGNOSIS — Z89.431 ACQUIRED ABSENCE OF RIGHT FOOT: ICD-10-CM

## 2021-04-16 DIAGNOSIS — E11.40 TYPE 2 DIABETES MELLITUS WITH DIABETIC NEUROPATHY, UNSPECIFIED: ICD-10-CM

## 2021-04-16 NOTE — END OF VISIT
[Resident] : Resident [FreeTextEntry2] : modification of insert indicated to reduce pressure point at TMA stump\par \par \par \par \par \par -claudication symptoms, temperature changes (toes are cool to touch), Paresthesias, Burning, Edema\par  [FreeTextEntry3] : skin hemorrhage noted on lateral aspect of the right TMA stump, secondary to pressure point\par \par Class A\par -non-traumatic amputation of foot/digit

## 2021-04-19 ENCOUNTER — OUTPATIENT (OUTPATIENT)
Dept: OUTPATIENT SERVICES | Facility: HOSPITAL | Age: 71
LOS: 1 days | Discharge: HOME | End: 2021-04-19

## 2021-04-19 ENCOUNTER — APPOINTMENT (OUTPATIENT)
Dept: PODIATRY | Facility: CLINIC | Age: 71
End: 2021-04-19
Payer: MEDICARE

## 2021-04-19 DIAGNOSIS — Z89.429 ACQUIRED ABSENCE OF OTHER TOE(S), UNSPECIFIED SIDE: Chronic | ICD-10-CM

## 2021-04-19 DIAGNOSIS — Z98.890 OTHER SPECIFIED POSTPROCEDURAL STATES: ICD-10-CM

## 2021-04-19 DIAGNOSIS — Z89.421 ACQUIRED ABSENCE OF OTHER RIGHT TOE(S): Chronic | ICD-10-CM

## 2021-04-19 PROCEDURE — 99024 POSTOP FOLLOW-UP VISIT: CPT

## 2021-04-19 NOTE — PROCEDURE
[FreeTextEntry1] : mild debridement of callous ,nails clipped. modification of existing orthotics. went over wearing instructions with pt. pt will break in shoes slowly, check often for redness, rubbing. pt to return here in 2 weeks. [] : A mold was applied.

## 2021-04-20 PROBLEM — Z98.890 S/P FOOT SURGERY, LEFT: Status: ACTIVE | Noted: 2019-01-26

## 2021-04-20 NOTE — HISTORY OF PRESENT ILLNESS
[FreeTextEntry1] : Mr. Ceja is a 71 year old male patient who presents with s/p L 2nd digit amputation; evaluated L foot sx site\par \par 4/19/21;\par Patient presents to clinic of concern of amputation site; Patient says his wife noticed something off about the surgical site;\par Patient denies f/n/v and shortness of breath;

## 2021-04-20 NOTE — PROCEDURE
[FreeTextEntry1] : s/p 2nd Digit amputation; \par Concern For Drainage; \par \par Patient was seen and evaluated;\par No Signs of Active Infection; No Drainage Noted;\par Patient Advised to follow up w/ Jamshid in two weeks

## 2021-04-20 NOTE — END OF VISIT
[] : Resident [FreeTextEntry3] : 2nd toe amp stump is healed. no openings, no signs of infection\par no dressing needed  [FreeTextEntry2] : \par \par \par \par \par \par -claudication symptoms, temperature changes (toes are cool to touch), Paresthesias, Burning, Edema\par

## 2021-05-04 ENCOUNTER — APPOINTMENT (OUTPATIENT)
Dept: PODIATRY | Facility: CLINIC | Age: 71
End: 2021-05-04
Payer: MEDICARE

## 2021-05-04 DIAGNOSIS — M79.672 PAIN IN LEFT FOOT: ICD-10-CM

## 2021-05-04 DIAGNOSIS — G89.29 PAIN IN LEFT FOOT: ICD-10-CM

## 2021-05-04 DIAGNOSIS — M79.671 PAIN IN RIGHT FOOT: ICD-10-CM

## 2021-05-04 DIAGNOSIS — L84 CORNS AND CALLOSITIES: ICD-10-CM

## 2021-05-04 DIAGNOSIS — G89.29 PAIN IN RIGHT FOOT: ICD-10-CM

## 2021-05-04 PROCEDURE — 97763 ORTHC/PROSTC MGMT SBSQ ENC: CPT | Mod: GP

## 2021-05-05 PROBLEM — M79.672 CHRONIC FOOT PAIN, LEFT: Status: ACTIVE | Noted: 2017-02-27

## 2021-05-05 PROBLEM — M79.671 CHRONIC FOOT PAIN, RIGHT: Status: ACTIVE | Noted: 2017-02-27

## 2021-05-05 PROBLEM — L84 CALLUS: Status: ACTIVE | Noted: 2017-05-08

## 2021-05-05 NOTE — END OF VISIT
[FreeTextEntry2] : insert modified to reduce pressure to the foot \par Class A\par -non-traumatic amputation of foot/digit\par \par \par

## 2021-05-05 NOTE — PROCEDURE
[] : A mold was applied. [FreeTextEntry1] : good skin integrity throughout. ppt added to pt's inserts. toe fillers applied to inserts to take up room from amputated digits. felt added to tongue of shoes to tighten fit. went over wearing precautions with pt. pt expressed comfort with changes. pt will return in approximately 3 weeks to see podiatry.

## 2021-05-26 ENCOUNTER — NON-APPOINTMENT (OUTPATIENT)
Age: 71
End: 2021-05-26

## 2021-06-10 ENCOUNTER — APPOINTMENT (OUTPATIENT)
Dept: PODIATRY | Facility: CLINIC | Age: 71
End: 2021-06-10
Payer: MEDICARE

## 2021-06-10 ENCOUNTER — NON-APPOINTMENT (OUTPATIENT)
Age: 71
End: 2021-06-10

## 2021-06-10 ENCOUNTER — OUTPATIENT (OUTPATIENT)
Dept: OUTPATIENT SERVICES | Facility: HOSPITAL | Age: 71
LOS: 1 days | Discharge: HOME | End: 2021-06-10

## 2021-06-10 DIAGNOSIS — Z89.429 ACQUIRED ABSENCE OF OTHER TOE(S), UNSPECIFIED SIDE: Chronic | ICD-10-CM

## 2021-06-10 DIAGNOSIS — Z89.421 ACQUIRED ABSENCE OF OTHER RIGHT TOE(S): Chronic | ICD-10-CM

## 2021-06-10 PROCEDURE — 99213 OFFICE O/P EST LOW 20 MIN: CPT

## 2021-06-11 ENCOUNTER — NON-APPOINTMENT (OUTPATIENT)
Age: 71
End: 2021-06-11

## 2021-06-11 NOTE — PHYSICAL EXAM
[Skin Lesions] : no skin lesions [General Appearance - Alert] : alert [General Appearance - In No Acute Distress] : in no acute distress [1+] : left foot dorsalis pedis 1+ [Vibration Dec.] : diminished vibratory sensation at the level of the toes [Oriented To Time, Place, And Person] : oriented to person, place, and time [Impaired Insight] : insight and judgment were intact [de-identified] : right  TMA, left partial hallux and 2nd toe amputation  [FreeTextEntry1] : minimal skin hemorrhaging present on the lateral aspect of the TMA, right foot [Diminished Throughout Right Foot] : normal sensation with monofilament testing throughout right foot [Diminished Throughout Left Foot] : normal sensation with monofilament testing throughout left foot

## 2021-06-11 NOTE — PROCEDURE
[] : A mold was applied. [FreeTextEntry1] : good skin integrity throughout. Pt. currently wearing regular shoe gear\par Dbx of callous on left foot down to and including healthy epidermal tissue\par

## 2021-06-11 NOTE — END OF VISIT
[] : Resident [FreeTextEntry3] : -Loss of protective sensation: yes \par -Presence of foot deformity:  yes   \par -presence of pre-ulcerative lesion: yes \par   -hemorrhage into callus: no\par -atrophy of heel or metatarsal fat pads: no\par \par -Diabetic neurovascular foot assessment  performed. \par -Discussed with patient diabetic foot hygiene.Patient instructed to regularly check the bottom of the feet\par -pt to follow up for modification of forefoot fill to offload lateral pressure point area\par -Return 3 month (appt w/ Jamshid in 2 weeks)\par \par Class A\par -non-traumatic amputation of foot/digit\par Class B\par

## 2021-06-11 NOTE — HISTORY OF PRESENT ILLNESS
[FreeTextEntry1] : 71 year old M  presents for a diabetic foot evaluation. Mr. PORTER denies any tingling burning in his feet. \par

## 2021-06-28 ENCOUNTER — OUTPATIENT (OUTPATIENT)
Dept: OUTPATIENT SERVICES | Facility: HOSPITAL | Age: 71
LOS: 1 days | Discharge: HOME | End: 2021-06-28

## 2021-06-28 ENCOUNTER — APPOINTMENT (OUTPATIENT)
Dept: PODIATRY | Facility: CLINIC | Age: 71
End: 2021-06-28
Payer: MEDICARE

## 2021-06-28 DIAGNOSIS — Z89.421 ACQUIRED ABSENCE OF OTHER RIGHT TOE(S): Chronic | ICD-10-CM

## 2021-06-28 DIAGNOSIS — Z89.429 ACQUIRED ABSENCE OF OTHER TOE(S), UNSPECIFIED SIDE: Chronic | ICD-10-CM

## 2021-06-28 PROCEDURE — 97763 ORTHC/PROSTC MGMT SBSQ ENC: CPT | Mod: GP

## 2021-06-29 DIAGNOSIS — Z89.419 ACQUIRED ABSENCE OF UNSPECIFIED GREAT TOE: ICD-10-CM

## 2021-06-29 DIAGNOSIS — Z89.431 ACQUIRED ABSENCE OF RIGHT FOOT: ICD-10-CM

## 2021-06-29 DIAGNOSIS — E11.40 TYPE 2 DIABETES MELLITUS WITH DIABETIC NEUROPATHY, UNSPECIFIED: ICD-10-CM

## 2021-06-29 NOTE — PROCEDURE
[] : A mold was applied. [FreeTextEntry1] : mild debridement of callosities to right foot. good skin integrity throughout. orthotic fabricated for new boot, left insert modified to increase cushioning. went over wearing instructions/precautions. pt will return in several weeks.

## 2021-08-18 NOTE — ED ADULT NURSE NOTE - PAIN: PRESENCE, MLM
complains of pain/discomfort/R foot Cimzia Pregnancy And Lactation Text: This medication crosses the placenta but can be considered safe in certain situations. Cimzia may be excreted in breast milk.

## 2021-08-20 NOTE — ED ADULT TRIAGE NOTE - HEIGHT IN INCHES
11
Gen: Well appearing in NAD  Head: NC/AT  Neck: trachea midline  cv: rrr  Resp:  No distress  abd nontender   Ext: no deformities  Neuro:  A&O appears non focal  Skin:  Warm and dry as visualized  Psych:  Normal affect and mood

## 2021-08-24 ENCOUNTER — INPATIENT (INPATIENT)
Facility: HOSPITAL | Age: 71
LOS: 15 days | Discharge: NURSING, REHAB OR LONG-TERM CARE | End: 2021-09-09
Attending: HOSPITALIST | Admitting: HOSPITALIST
Payer: MEDICARE

## 2021-08-24 VITALS
SYSTOLIC BLOOD PRESSURE: 157 MMHG | OXYGEN SATURATION: 98 % | TEMPERATURE: 98 F | DIASTOLIC BLOOD PRESSURE: 70 MMHG | RESPIRATION RATE: 16 BRPM | HEART RATE: 109 BPM

## 2021-08-24 DIAGNOSIS — Z89.421 ACQUIRED ABSENCE OF OTHER RIGHT TOE(S): Chronic | ICD-10-CM

## 2021-08-24 DIAGNOSIS — Z89.429 ACQUIRED ABSENCE OF OTHER TOE(S), UNSPECIFIED SIDE: Chronic | ICD-10-CM

## 2021-08-24 LAB
ALBUMIN SERPL ELPH-MCNC: 4 G/DL — SIGNIFICANT CHANGE UP (ref 3.5–5.2)
ALBUMIN SERPL ELPH-MCNC: 4.7 G/DL — SIGNIFICANT CHANGE UP (ref 3.5–5.2)
ALP SERPL-CCNC: 150 U/L — HIGH (ref 30–115)
ALP SERPL-CCNC: 95 U/L — SIGNIFICANT CHANGE UP (ref 30–115)
ALT FLD-CCNC: 22 U/L — SIGNIFICANT CHANGE UP (ref 0–41)
ALT FLD-CCNC: 22 U/L — SIGNIFICANT CHANGE UP (ref 0–41)
ANION GAP SERPL CALC-SCNC: 12 MMOL/L — SIGNIFICANT CHANGE UP (ref 7–14)
ANION GAP SERPL CALC-SCNC: 22 MMOL/L — HIGH (ref 7–14)
APPEARANCE UR: CLEAR — SIGNIFICANT CHANGE UP
APPEARANCE UR: CLEAR — SIGNIFICANT CHANGE UP
APTT BLD: 29.1 SEC — SIGNIFICANT CHANGE UP (ref 27–39.2)
AST SERPL-CCNC: 22 U/L — SIGNIFICANT CHANGE UP (ref 0–41)
AST SERPL-CCNC: 27 U/L — SIGNIFICANT CHANGE UP (ref 0–41)
B-OH-BUTYR SERPL-SCNC: 1.2 MMOL/L — HIGH
BASE EXCESS BLDA CALC-SCNC: 4.2 MMOL/L — HIGH (ref -2–2)
BASE EXCESS BLDV CALC-SCNC: -3.5 MMOL/L — LOW (ref -2–3)
BASOPHILS # BLD AUTO: 0.02 K/UL — SIGNIFICANT CHANGE UP (ref 0–0.2)
BASOPHILS NFR BLD AUTO: 0.3 % — SIGNIFICANT CHANGE UP (ref 0–1)
BILIRUB SERPL-MCNC: 0.5 MG/DL — SIGNIFICANT CHANGE UP (ref 0.2–1.2)
BILIRUB SERPL-MCNC: 0.7 MG/DL — SIGNIFICANT CHANGE UP (ref 0.2–1.2)
BILIRUB UR-MCNC: NEGATIVE — SIGNIFICANT CHANGE UP
BILIRUB UR-MCNC: NEGATIVE — SIGNIFICANT CHANGE UP
BUN SERPL-MCNC: 25 MG/DL — HIGH (ref 10–20)
BUN SERPL-MCNC: 30 MG/DL — HIGH (ref 10–20)
CA-I SERPL-SCNC: 1.28 MMOL/L — SIGNIFICANT CHANGE UP (ref 1.15–1.33)
CALCIUM SERPL-MCNC: 9.4 MG/DL — SIGNIFICANT CHANGE UP (ref 8.5–10.1)
CALCIUM SERPL-MCNC: 9.7 MG/DL — SIGNIFICANT CHANGE UP (ref 8.5–10.1)
CHLORIDE SERPL-SCNC: 102 MMOL/L — SIGNIFICANT CHANGE UP (ref 98–110)
CHLORIDE SERPL-SCNC: 87 MMOL/L — LOW (ref 98–110)
CK SERPL-CCNC: 108 U/L — SIGNIFICANT CHANGE UP (ref 0–225)
CO2 SERPL-SCNC: 19 MMOL/L — SIGNIFICANT CHANGE UP (ref 17–32)
CO2 SERPL-SCNC: 26 MMOL/L — SIGNIFICANT CHANGE UP (ref 17–32)
COLOR SPEC: COLORLESS — SIGNIFICANT CHANGE UP
COLOR SPEC: SIGNIFICANT CHANGE UP
CREAT SERPL-MCNC: 1.4 MG/DL — SIGNIFICANT CHANGE UP (ref 0.7–1.5)
CREAT SERPL-MCNC: 1.7 MG/DL — HIGH (ref 0.7–1.5)
DIFF PNL FLD: NEGATIVE — SIGNIFICANT CHANGE UP
DIFF PNL FLD: SIGNIFICANT CHANGE UP
EOSINOPHIL # BLD AUTO: 0.01 K/UL — SIGNIFICANT CHANGE UP (ref 0–0.7)
EOSINOPHIL NFR BLD AUTO: 0.1 % — SIGNIFICANT CHANGE UP (ref 0–8)
GAS PNL BLDA: SIGNIFICANT CHANGE UP
GAS PNL BLDV: 130 MMOL/L — LOW (ref 136–145)
GAS PNL BLDV: SIGNIFICANT CHANGE UP
GLUCOSE BLDC GLUCOMTR-MCNC: 130 MG/DL — HIGH (ref 70–99)
GLUCOSE BLDC GLUCOMTR-MCNC: 136 MG/DL — HIGH (ref 70–99)
GLUCOSE BLDC GLUCOMTR-MCNC: 144 MG/DL — HIGH (ref 70–99)
GLUCOSE BLDC GLUCOMTR-MCNC: 146 MG/DL — HIGH (ref 70–99)
GLUCOSE BLDC GLUCOMTR-MCNC: 160 MG/DL — HIGH (ref 70–99)
GLUCOSE BLDC GLUCOMTR-MCNC: 164 MG/DL — HIGH (ref 70–99)
GLUCOSE BLDC GLUCOMTR-MCNC: 165 MG/DL — HIGH (ref 70–99)
GLUCOSE BLDC GLUCOMTR-MCNC: 165 MG/DL — HIGH (ref 70–99)
GLUCOSE BLDC GLUCOMTR-MCNC: 174 MG/DL — HIGH (ref 70–99)
GLUCOSE BLDC GLUCOMTR-MCNC: 177 MG/DL — HIGH (ref 70–99)
GLUCOSE BLDC GLUCOMTR-MCNC: 192 MG/DL — HIGH (ref 70–99)
GLUCOSE BLDC GLUCOMTR-MCNC: 194 MG/DL — HIGH (ref 70–99)
GLUCOSE BLDC GLUCOMTR-MCNC: 221 MG/DL — HIGH (ref 70–99)
GLUCOSE BLDC GLUCOMTR-MCNC: 245 MG/DL — HIGH (ref 70–99)
GLUCOSE BLDC GLUCOMTR-MCNC: 267 MG/DL — HIGH (ref 70–99)
GLUCOSE BLDC GLUCOMTR-MCNC: 315 MG/DL — HIGH (ref 70–99)
GLUCOSE BLDC GLUCOMTR-MCNC: 347 MG/DL — HIGH (ref 70–99)
GLUCOSE BLDC GLUCOMTR-MCNC: 508 MG/DL — CRITICAL HIGH (ref 70–99)
GLUCOSE BLDC GLUCOMTR-MCNC: 90 MG/DL — SIGNIFICANT CHANGE UP (ref 70–99)
GLUCOSE SERPL-MCNC: 1052 MG/DL — CRITICAL HIGH (ref 70–99)
GLUCOSE SERPL-MCNC: 147 MG/DL — HIGH (ref 70–99)
GLUCOSE UR QL: ABNORMAL
GLUCOSE UR QL: ABNORMAL
HCO3 BLDA-SCNC: 28 MMOL/L — SIGNIFICANT CHANGE UP (ref 21–29)
HCO3 BLDV-SCNC: 22 MMOL/L — SIGNIFICANT CHANGE UP (ref 22–29)
HCT VFR BLD CALC: 42.2 % — SIGNIFICANT CHANGE UP (ref 42–52)
HCT VFR BLDA CALC: 41 % — SIGNIFICANT CHANGE UP (ref 39–51)
HGB BLD CALC-MCNC: 13.8 G/DL — SIGNIFICANT CHANGE UP (ref 12.6–17.4)
HGB BLD-MCNC: 13.4 G/DL — LOW (ref 14–18)
IMM GRANULOCYTES NFR BLD AUTO: 1.2 % — HIGH (ref 0.1–0.3)
INR BLD: 0.89 RATIO — SIGNIFICANT CHANGE UP (ref 0.65–1.3)
KETONES UR-MCNC: ABNORMAL
KETONES UR-MCNC: ABNORMAL
LACTATE BLDV-MCNC: 7.9 MMOL/L — CRITICAL HIGH (ref 0.5–2)
LACTATE SERPL-SCNC: 1.3 MMOL/L — SIGNIFICANT CHANGE UP (ref 0.7–2)
LEUKOCYTE ESTERASE UR-ACNC: NEGATIVE — SIGNIFICANT CHANGE UP
LEUKOCYTE ESTERASE UR-ACNC: NEGATIVE — SIGNIFICANT CHANGE UP
LITHIUM SERPL-MCNC: <0.05 MMOL/L — LOW (ref 0.6–1.2)
LYMPHOCYTES # BLD AUTO: 0.65 K/UL — LOW (ref 1.2–3.4)
LYMPHOCYTES # BLD AUTO: 8.6 % — LOW (ref 20.5–51.1)
MAGNESIUM SERPL-MCNC: 2.1 MG/DL — SIGNIFICANT CHANGE UP (ref 1.8–2.4)
MCHC RBC-ENTMCNC: 29.1 PG — SIGNIFICANT CHANGE UP (ref 27–31)
MCHC RBC-ENTMCNC: 31.8 G/DL — LOW (ref 32–37)
MCV RBC AUTO: 91.7 FL — SIGNIFICANT CHANGE UP (ref 80–94)
MONOCYTES # BLD AUTO: 0.47 K/UL — SIGNIFICANT CHANGE UP (ref 0.1–0.6)
MONOCYTES NFR BLD AUTO: 6.2 % — SIGNIFICANT CHANGE UP (ref 1.7–9.3)
NEUTROPHILS # BLD AUTO: 6.3 K/UL — SIGNIFICANT CHANGE UP (ref 1.4–6.5)
NEUTROPHILS NFR BLD AUTO: 83.6 % — HIGH (ref 42.2–75.2)
NITRITE UR-MCNC: NEGATIVE — SIGNIFICANT CHANGE UP
NITRITE UR-MCNC: NEGATIVE — SIGNIFICANT CHANGE UP
NRBC # BLD: 0 /100 WBCS — SIGNIFICANT CHANGE UP (ref 0–0)
OSMOLALITY SERPL: 298 MOS/KG — SIGNIFICANT CHANGE UP (ref 280–301)
PCO2 BLDA: 38 MMHG — SIGNIFICANT CHANGE UP (ref 38–42)
PCO2 BLDV: 38 MMHG — LOW (ref 42–55)
PH BLDA: 7.47 — HIGH (ref 7.38–7.42)
PH BLDV: 7.36 — SIGNIFICANT CHANGE UP (ref 7.32–7.43)
PH UR: 6.5 — SIGNIFICANT CHANGE UP (ref 5–8)
PH UR: 6.5 — SIGNIFICANT CHANGE UP (ref 5–8)
PLATELET # BLD AUTO: 180 K/UL — SIGNIFICANT CHANGE UP (ref 130–400)
PO2 BLDA: 78 MMHG — SIGNIFICANT CHANGE UP (ref 78–95)
PO2 BLDV: 81 MMHG — SIGNIFICANT CHANGE UP
POTASSIUM BLDV-SCNC: 5.8 MMOL/L — HIGH (ref 3.5–5.1)
POTASSIUM SERPL-MCNC: 4.3 MMOL/L — SIGNIFICANT CHANGE UP (ref 3.5–5)
POTASSIUM SERPL-MCNC: 5.5 MMOL/L — HIGH (ref 3.5–5)
POTASSIUM SERPL-SCNC: 4.3 MMOL/L — SIGNIFICANT CHANGE UP (ref 3.5–5)
POTASSIUM SERPL-SCNC: 5.5 MMOL/L — HIGH (ref 3.5–5)
PROT SERPL-MCNC: 5.8 G/DL — LOW (ref 6–8)
PROT SERPL-MCNC: 7 G/DL — SIGNIFICANT CHANGE UP (ref 6–8)
PROT UR-MCNC: NEGATIVE — SIGNIFICANT CHANGE UP
PROT UR-MCNC: SIGNIFICANT CHANGE UP
PROTHROM AB SERPL-ACNC: 10.2 SEC — SIGNIFICANT CHANGE UP (ref 9.95–12.87)
RBC # BLD: 4.6 M/UL — LOW (ref 4.7–6.1)
RBC # FLD: 13.3 % — SIGNIFICANT CHANGE UP (ref 11.5–14.5)
SAO2 % BLDA: 96.8 % — HIGH (ref 92–96)
SAO2 % BLDV: 97.3 % — SIGNIFICANT CHANGE UP
SARS-COV-2 RNA SPEC QL NAA+PROBE: SIGNIFICANT CHANGE UP
SODIUM SERPL-SCNC: 128 MMOL/L — LOW (ref 135–146)
SODIUM SERPL-SCNC: 140 MMOL/L — SIGNIFICANT CHANGE UP (ref 135–146)
SP GR SPEC: 1.03 — SIGNIFICANT CHANGE UP (ref 1.01–1.03)
SP GR SPEC: 1.05 — HIGH (ref 1.01–1.03)
TROPONIN T SERPL-MCNC: <0.01 NG/ML — SIGNIFICANT CHANGE UP
UROBILINOGEN FLD QL: SIGNIFICANT CHANGE UP
UROBILINOGEN FLD QL: SIGNIFICANT CHANGE UP
WBC # BLD: 7.54 K/UL — SIGNIFICANT CHANGE UP (ref 4.8–10.8)
WBC # FLD AUTO: 7.54 K/UL — SIGNIFICANT CHANGE UP (ref 4.8–10.8)

## 2021-08-24 PROCEDURE — 99291 CRITICAL CARE FIRST HOUR: CPT

## 2021-08-24 PROCEDURE — 71045 X-RAY EXAM CHEST 1 VIEW: CPT | Mod: 26,77

## 2021-08-24 PROCEDURE — 71045 X-RAY EXAM CHEST 1 VIEW: CPT | Mod: 26

## 2021-08-24 PROCEDURE — 93010 ELECTROCARDIOGRAM REPORT: CPT

## 2021-08-24 PROCEDURE — 93010 ELECTROCARDIOGRAM REPORT: CPT | Mod: 76,77

## 2021-08-24 PROCEDURE — 93010 ELECTROCARDIOGRAM REPORT: CPT | Mod: 77

## 2021-08-24 PROCEDURE — 70450 CT HEAD/BRAIN W/O DYE: CPT | Mod: 26,59,MA

## 2021-08-24 PROCEDURE — 70496 CT ANGIOGRAPHY HEAD: CPT | Mod: 26,MA

## 2021-08-24 PROCEDURE — 93306 TTE W/DOPPLER COMPLETE: CPT | Mod: 26

## 2021-08-24 PROCEDURE — 99222 1ST HOSP IP/OBS MODERATE 55: CPT

## 2021-08-24 PROCEDURE — 93970 EXTREMITY STUDY: CPT | Mod: 26

## 2021-08-24 PROCEDURE — 0042T: CPT

## 2021-08-24 PROCEDURE — 70498 CT ANGIOGRAPHY NECK: CPT | Mod: 26,MA

## 2021-08-24 RX ORDER — MIDAZOLAM HYDROCHLORIDE 1 MG/ML
4 INJECTION, SOLUTION INTRAMUSCULAR; INTRAVENOUS ONCE
Refills: 0 | Status: DISCONTINUED | OUTPATIENT
Start: 2021-08-24 | End: 2021-08-24

## 2021-08-24 RX ORDER — ASPIRIN/CALCIUM CARB/MAGNESIUM 324 MG
81 TABLET ORAL DAILY
Refills: 0 | Status: DISCONTINUED | OUTPATIENT
Start: 2021-08-24 | End: 2021-09-09

## 2021-08-24 RX ORDER — DEXTROSE MONOHYDRATE, SODIUM CHLORIDE, AND POTASSIUM CHLORIDE 50; .745; 4.5 G/1000ML; G/1000ML; G/1000ML
1000 INJECTION, SOLUTION INTRAVENOUS
Refills: 0 | Status: DISCONTINUED | OUTPATIENT
Start: 2021-08-24 | End: 2021-08-24

## 2021-08-24 RX ORDER — MIDAZOLAM HYDROCHLORIDE 1 MG/ML
0.02 INJECTION, SOLUTION INTRAMUSCULAR; INTRAVENOUS
Qty: 100 | Refills: 0 | Status: DISCONTINUED | OUTPATIENT
Start: 2021-08-24 | End: 2021-08-24

## 2021-08-24 RX ORDER — SODIUM CHLORIDE 9 MG/ML
50 INJECTION, SOLUTION INTRAVENOUS ONCE
Refills: 0 | Status: DISCONTINUED | OUTPATIENT
Start: 2021-08-24 | End: 2021-08-24

## 2021-08-24 RX ORDER — CEFTRIAXONE 500 MG/1
1000 INJECTION, POWDER, FOR SOLUTION INTRAMUSCULAR; INTRAVENOUS ONCE
Refills: 0 | Status: DISCONTINUED | OUTPATIENT
Start: 2021-08-24 | End: 2021-08-27

## 2021-08-24 RX ORDER — CEFTRIAXONE 500 MG/1
INJECTION, POWDER, FOR SOLUTION INTRAMUSCULAR; INTRAVENOUS
Refills: 0 | Status: DISCONTINUED | OUTPATIENT
Start: 2021-08-24 | End: 2021-08-27

## 2021-08-24 RX ORDER — PROPOFOL 10 MG/ML
15 INJECTION, EMULSION INTRAVENOUS
Qty: 1000 | Refills: 0 | Status: DISCONTINUED | OUTPATIENT
Start: 2021-08-24 | End: 2021-08-24

## 2021-08-24 RX ORDER — LEVETIRACETAM 250 MG/1
1000 TABLET, FILM COATED ORAL EVERY 12 HOURS
Refills: 0 | Status: DISCONTINUED | OUTPATIENT
Start: 2021-08-24 | End: 2021-08-24

## 2021-08-24 RX ORDER — CHLORHEXIDINE GLUCONATE 213 G/1000ML
15 SOLUTION TOPICAL EVERY 12 HOURS
Refills: 0 | Status: DISCONTINUED | OUTPATIENT
Start: 2021-08-24 | End: 2021-09-02

## 2021-08-24 RX ORDER — LEVETIRACETAM 250 MG/1
1000 TABLET, FILM COATED ORAL ONCE
Refills: 0 | Status: COMPLETED | OUTPATIENT
Start: 2021-08-24 | End: 2021-08-24

## 2021-08-24 RX ORDER — SODIUM CHLORIDE 9 MG/ML
500 INJECTION, SOLUTION INTRAVENOUS ONCE
Refills: 0 | Status: COMPLETED | OUTPATIENT
Start: 2021-08-24 | End: 2021-08-24

## 2021-08-24 RX ORDER — ASPIRIN/CALCIUM CARB/MAGNESIUM 324 MG
81 TABLET ORAL DAILY
Refills: 0 | Status: DISCONTINUED | OUTPATIENT
Start: 2021-08-24 | End: 2021-08-24

## 2021-08-24 RX ORDER — ACETAMINOPHEN 500 MG
1000 TABLET ORAL ONCE
Refills: 0 | Status: COMPLETED | OUTPATIENT
Start: 2021-08-24 | End: 2021-08-24

## 2021-08-24 RX ORDER — SODIUM CHLORIDE 9 MG/ML
1000 INJECTION, SOLUTION INTRAVENOUS
Refills: 0 | Status: DISCONTINUED | OUTPATIENT
Start: 2021-08-24 | End: 2021-08-25

## 2021-08-24 RX ORDER — CEFTRIAXONE 500 MG/1
1000 INJECTION, POWDER, FOR SOLUTION INTRAMUSCULAR; INTRAVENOUS EVERY 24 HOURS
Refills: 0 | Status: DISCONTINUED | OUTPATIENT
Start: 2021-08-25 | End: 2021-08-27

## 2021-08-24 RX ORDER — SODIUM CHLORIDE 9 MG/ML
1000 INJECTION, SOLUTION INTRAVENOUS ONCE
Refills: 0 | Status: COMPLETED | OUTPATIENT
Start: 2021-08-24 | End: 2021-08-24

## 2021-08-24 RX ORDER — NOREPINEPHRINE BITARTRATE/D5W 8 MG/250ML
0.05 PLASTIC BAG, INJECTION (ML) INTRAVENOUS
Qty: 32 | Refills: 0 | Status: DISCONTINUED | OUTPATIENT
Start: 2021-08-24 | End: 2021-08-24

## 2021-08-24 RX ORDER — MIDAZOLAM HYDROCHLORIDE 1 MG/ML
2 INJECTION, SOLUTION INTRAMUSCULAR; INTRAVENOUS ONCE
Refills: 0 | Status: DISCONTINUED | OUTPATIENT
Start: 2021-08-24 | End: 2021-08-24

## 2021-08-24 RX ORDER — ACETAMINOPHEN 500 MG
650 TABLET ORAL EVERY 6 HOURS
Refills: 0 | Status: DISCONTINUED | OUTPATIENT
Start: 2021-08-24 | End: 2021-08-26

## 2021-08-24 RX ORDER — MIDAZOLAM HYDROCHLORIDE 1 MG/ML
0.2 INJECTION, SOLUTION INTRAMUSCULAR; INTRAVENOUS
Qty: 100 | Refills: 0 | Status: DISCONTINUED | OUTPATIENT
Start: 2021-08-24 | End: 2021-08-24

## 2021-08-24 RX ORDER — LEVETIRACETAM 250 MG/1
750 TABLET, FILM COATED ORAL EVERY 12 HOURS
Refills: 0 | Status: DISCONTINUED | OUTPATIENT
Start: 2021-08-24 | End: 2021-08-25

## 2021-08-24 RX ORDER — ATORVASTATIN CALCIUM 80 MG/1
20 TABLET, FILM COATED ORAL AT BEDTIME
Refills: 0 | Status: DISCONTINUED | OUTPATIENT
Start: 2021-08-24 | End: 2021-09-04

## 2021-08-24 RX ORDER — VASOPRESSIN 20 [USP'U]/ML
0.1 INJECTION INTRAVENOUS
Qty: 50 | Refills: 0 | Status: DISCONTINUED | OUTPATIENT
Start: 2021-08-24 | End: 2021-08-26

## 2021-08-24 RX ORDER — MIDAZOLAM HYDROCHLORIDE 1 MG/ML
1 INJECTION, SOLUTION INTRAMUSCULAR; INTRAVENOUS
Qty: 100 | Refills: 0 | Status: DISCONTINUED | OUTPATIENT
Start: 2021-08-24 | End: 2021-08-26

## 2021-08-24 RX ORDER — LEVETIRACETAM 250 MG/1
2000 TABLET, FILM COATED ORAL ONCE
Refills: 0 | Status: COMPLETED | OUTPATIENT
Start: 2021-08-24 | End: 2021-08-24

## 2021-08-24 RX ORDER — NOREPINEPHRINE BITARTRATE/D5W 8 MG/250ML
0.05 PLASTIC BAG, INJECTION (ML) INTRAVENOUS
Qty: 8 | Refills: 0 | Status: DISCONTINUED | OUTPATIENT
Start: 2021-08-24 | End: 2021-08-27

## 2021-08-24 RX ORDER — INSULIN HUMAN 100 [IU]/ML
8.8 INJECTION, SOLUTION SUBCUTANEOUS
Qty: 100 | Refills: 0 | Status: DISCONTINUED | OUTPATIENT
Start: 2021-08-24 | End: 2021-08-26

## 2021-08-24 RX ORDER — FENTANYL CITRATE 50 UG/ML
0.5 INJECTION INTRAVENOUS
Qty: 2500 | Refills: 0 | Status: DISCONTINUED | OUTPATIENT
Start: 2021-08-24 | End: 2021-08-25

## 2021-08-24 RX ORDER — PROPOFOL 10 MG/ML
30 INJECTION, EMULSION INTRAVENOUS
Qty: 1000 | Refills: 0 | Status: DISCONTINUED | OUTPATIENT
Start: 2021-08-24 | End: 2021-08-24

## 2021-08-24 RX ORDER — SODIUM CHLORIDE 9 MG/ML
250 INJECTION, SOLUTION INTRAVENOUS ONCE
Refills: 0 | Status: COMPLETED | OUTPATIENT
Start: 2021-08-24 | End: 2021-08-24

## 2021-08-24 RX ORDER — PROPOFOL 10 MG/ML
10 INJECTION, EMULSION INTRAVENOUS ONCE
Refills: 0 | Status: DISCONTINUED | OUTPATIENT
Start: 2021-08-24 | End: 2021-08-24

## 2021-08-24 RX ADMIN — SODIUM CHLORIDE 500 MILLILITER(S): 9 INJECTION, SOLUTION INTRAVENOUS at 11:53

## 2021-08-24 RX ADMIN — Medication 400 MILLIGRAM(S): at 10:38

## 2021-08-24 RX ADMIN — SODIUM CHLORIDE 1000 MILLILITER(S): 9 INJECTION, SOLUTION INTRAVENOUS at 12:40

## 2021-08-24 RX ADMIN — SODIUM CHLORIDE 50 MILLILITER(S): 9 INJECTION, SOLUTION INTRAVENOUS at 18:04

## 2021-08-24 RX ADMIN — LEVETIRACETAM 400 MILLIGRAM(S): 250 TABLET, FILM COATED ORAL at 17:29

## 2021-08-24 RX ADMIN — LEVETIRACETAM 400 MILLIGRAM(S): 250 TABLET, FILM COATED ORAL at 01:42

## 2021-08-24 RX ADMIN — CHLORHEXIDINE GLUCONATE 15 MILLILITER(S): 213 SOLUTION TOPICAL at 17:29

## 2021-08-24 RX ADMIN — SODIUM CHLORIDE 1000 MILLILITER(S): 9 INJECTION, SOLUTION INTRAVENOUS at 01:43

## 2021-08-24 RX ADMIN — LEVETIRACETAM 1000 MILLIGRAM(S): 250 TABLET, FILM COATED ORAL at 04:24

## 2021-08-24 RX ADMIN — PROPOFOL 16 MICROGRAM(S)/KG/MIN: 10 INJECTION, EMULSION INTRAVENOUS at 10:40

## 2021-08-24 RX ADMIN — INSULIN HUMAN 8.8 UNIT(S)/HR: 100 INJECTION, SOLUTION SUBCUTANEOUS at 02:44

## 2021-08-24 RX ADMIN — SODIUM CHLORIDE 1000 MILLILITER(S): 9 INJECTION, SOLUTION INTRAVENOUS at 04:24

## 2021-08-24 RX ADMIN — Medication 81 MILLIGRAM(S): at 12:05

## 2021-08-24 RX ADMIN — Medication 650 MILLIGRAM(S): at 14:30

## 2021-08-24 RX ADMIN — MIDAZOLAM HYDROCHLORIDE 1.76 MG/KG/HR: 1 INJECTION, SOLUTION INTRAMUSCULAR; INTRAVENOUS at 13:09

## 2021-08-24 RX ADMIN — ATORVASTATIN CALCIUM 20 MILLIGRAM(S): 80 TABLET, FILM COATED ORAL at 22:51

## 2021-08-24 RX ADMIN — MIDAZOLAM HYDROCHLORIDE 2 MILLIGRAM(S): 1 INJECTION, SOLUTION INTRAMUSCULAR; INTRAVENOUS at 01:52

## 2021-08-24 RX ADMIN — INSULIN HUMAN 8.8 UNIT(S)/HR: 100 INJECTION, SOLUTION SUBCUTANEOUS at 10:40

## 2021-08-24 RX ADMIN — Medication 650 MILLIGRAM(S): at 14:00

## 2021-08-24 RX ADMIN — Medication 2 MILLIGRAM(S): at 00:42

## 2021-08-24 RX ADMIN — MIDAZOLAM HYDROCHLORIDE 4 MILLIGRAM(S): 1 INJECTION, SOLUTION INTRAMUSCULAR; INTRAVENOUS at 17:29

## 2021-08-24 RX ADMIN — MIDAZOLAM HYDROCHLORIDE 4 MILLIGRAM(S): 1 INJECTION, SOLUTION INTRAMUSCULAR; INTRAVENOUS at 17:40

## 2021-08-24 RX ADMIN — Medication 8.27 MICROGRAM(S)/KG/MIN: at 13:10

## 2021-08-24 RX ADMIN — Medication 1000 MILLIGRAM(S): at 10:53

## 2021-08-24 RX ADMIN — Medication 2 MILLIGRAM(S): at 01:42

## 2021-08-24 NOTE — PROCEDURE NOTE - NSPOSTPRCRAD_GEN_A_CORE
central line located in the superior vena cava/line adjusted to depth of insertion/post-procedure radiography performed

## 2021-08-24 NOTE — PROCEDURE NOTE - ADDITIONAL PROCEDURE DETAILS
pt pre oxygenated 100 NRBM non responsive  /84 RR 20 o2 sat 100. propofol 200mg IV rocuronium 50 mg IV given glidescope mac 3 used grade 1 view noted quick easy intubation.  /90 on vent o2 sat 100.

## 2021-08-24 NOTE — ED PROVIDER NOTE - ATTENDING CONTRIBUTION TO CARE
71-year-old male presents to the ED with acute onset confusion.  Patient symptoms started 1.5 hours prior to arrival.  On that initial evaluation by family, patient was noted to have right-sided weakness with clonus activity subsequently.  EMS noted patient's mental status to deteriorate enroute.  On my evaluation patient ANO x0, eyes opening spontaneously, will not follow commands.  Unable to obtain proper neurological exam.  Fingerstick greater than 600.  Stroke code activated.  Patient taken to CT.  In CT patient noted to have a tonic-clonic seizure episode.  Ativan 2 mg given and brought back to the critical care area.  Patient subsequently noted to be agitated, restrained given additional Ativan.  Initial CT reading negative for acute intracranial hemorrhage, infarct, or mass.  Neurology provider Gary at bedside, case conveyed to telestroke attending- Not a TPA candidate.    Initial vitals hypertensive, tachycardic, afebrile rectally    Physical exam unremarkable, unable to obtain a full neurological exam. perrl,mmm,Tachycardic,ctab,abd softntnd,fromx4    Case signed out to Dr. Marin pending remainder of the stroke CTs, labs, urine and final disposition.    Stroke versus metabolic

## 2021-08-24 NOTE — H&P ADULT - NSHPPHYSICALEXAM_GEN_ALL_CORE
VITALS:   T(C): 37.2 (08-24-21 @ 04:21), Max: 37.2 (08-24-21 @ 04:21)  HR: 118 (08-24-21 @ 05:04) (109 - 123)  BP: 168/90 (08-24-21 @ 05:04) (125/80 - 168/90)  RR: 16 (08-24-21 @ 05:04) (16 - 19)  SpO2: 97% (08-24-21 @ 05:04) (94% - 98%)    GENERAL: unconscious   HEAD:  Atraumatic, normocephalic  EYES: EOMI, PERRLA, conjunctiva and sclera clear  ENT: Moist mucous membranes  NECK: Supple, no JVD  HEART: Regular rate and rhythm, no murmurs, rubs, or gallops  LUNGS: Unlabored respirations.  Clear to auscultation bilaterally, no crackles, wheezing, or rhonchi  ABDOMEN: Soft, nontender, nondistended, +BS  EXTREMITIES: 2+ peripheral pulses bilaterally. No clubbing, cyanosis, or edema  NERVOUS SYSTEM:  A&Ox0, grimace to painful stimuli   SKIN: No rashes or lesions

## 2021-08-24 NOTE — ED PROVIDER NOTE - OBJECTIVE STATEMENT
70 y/o M with pmh of dm, osteomyelitis, gerd, depression, hld, htn presenting to the ED for R sided weakness. Symptoms started about 1.5 hours PTA according to pt's wife. Patient was speaking and alert/oriented upon EMS arrival to home. Per EMS, patient became more altered during transport- started looking around, not answering questions, seemed more confused. FS on scene >600. Did not take dm medications today. 72 y/o M with pmh of dm, osteomyelitis, gerd, depression, hld, htn presenting to the ED for R sided weakness. Symptoms started about 1.5 hours PTA according to pt's wife. Patient was speaking and alert/oriented upon EMS arrival to home. Per EMS, patient became more altered during transport- started looking around, not answering questions, seemed more confused. FS on scene >600. Did not take dm medications today.    Further history obtained from wife at bedside: patient seemed sleepier than usual today, sat in chair in front of tv and slept most of the day. Went to [place] and came home then started experiencing RUE weakness, started shaking his R arm around. Speech slurred and seemed confused, was not making sense to her. Family then called EMS.

## 2021-08-24 NOTE — ED ADULT NURSE NOTE - NSIMPLEMENTINTERV_GEN_ALL_ED
Implemented All Fall with Harm Risk Interventions:  [place] to call system. Call bell, personal items and telephone within reach. Instruct patient to call for assistance. Room bathroom lighting operational. Non-slip footwear when patient is off stretcher. Physically safe environment: no spills, clutter or unnecessary equipment. Stretcher in lowest position, wheels locked, appropriate side rails in place. Provide visual cue, wrist band, yellow gown, etc. Monitor gait and stability. Monitor for mental status changes and reorient to person, place, and time. Review medications for side effects contributing to fall risk. Reinforce activity limits and safety measures with patient and family. Provide visual clues: red socks.

## 2021-08-24 NOTE — CONSULT NOTE ADULT - ASSESSMENT
71y Male with PMHx of of insulin-dependent diabetes mellitus complicated by diabetic foot ulcers s/p amputation of half of left great toe, entire right great toe, right 3rd toe, right 4th toe, peripheral vascular disease, hypertension, hyperlipidemia, GERD, and depression who presented to ED as a code stroke for AMS, aphasia and dysarthria followed with abnormal shaking of right upper extremity. NIHSS 9. Had 2 episodes of witnessed seizure in ED.   In CT scan patient had 2 witnessed seizure episodes, one first over the left leg that lasted few seconds, then he had GTC seizure that lasted for >1 min requiring Ativan 2 mg x2. Patient noted to have urinary incontinence. CTA done was non diagnostic. Labs significant for glucose 1052, Creat 1.7,  (corrected to glucose), beta hydroxy butyrate 1.2. Patient was loaded with Keppra 40 mg/kg.    Impression:   71y Male with PMHx of insulin-dependent diabetes mellitus complicated by diabetic foot ulcers s/p amputation of half of left great toe, entire right great toe, right 3rd toe, right 4th toe, peripheral vascular disease, hypertension, hyperlipidemia, GERD, and depression who presented to ED as a code stroke for AMS, aphasia and dysarthria followed with abnormal shaking of right upper extremity. NIHSS 9. Had 2 episodes of witnessed seizure in ED.  CTH negative, CTA head and neck was non diagnostic. Patient was loaded with Keppra 40 mg/kg. ED attending spoke to tele stroke attending - Not a TPA candidate.    Impression:  Patient with AMS, aphasia and dysarthria with new onset of seizure. Seizure is likely triggered by an underlying metabolic process (glucose >1000).  -Will load with additional 2g of Keppra as patient might be in status  -CTH and CTA negative  -VEEG  -MR brain non contrast  -c/w ASA 81mg and statin  -Keep Mg>2  -Seizure precautions  -Neuro critical care on board   71y Male with PMHx of insulin-dependent diabetes mellitus complicated by diabetic foot ulcers s/p amputation of half of left great toe, entire right great toe, right 3rd toe, right 4th toe, peripheral vascular disease, hypertension, hyperlipidemia, GERD, and depression who presented to ED as a code stroke for AMS, aphasia and dysarthria followed with abnormal shaking of right upper extremity. NIHSS 9. Had 2 episodes of witnessed seizure in ED.  CTH negative, CTA head and neck was non diagnostic. Patient was loaded with Keppra 40 mg/kg. ED attending spoke to tele stroke attending - Not a TPA candidate.    Impression:  Patient with AMS, aphasia and dysarthria with new onset of seizure. Seizure is likely triggered by an underlying metabolic process (glucose >1000), though concurrent neurologic event such as stroke cannot be excluded given presentation.  -Will load with additional 2g of Keppra (for total of 3 g) as patient might be in status  -CTH and CTA negative  -VEEG  -MR brain non contrast  -c/w ASA 81mg and statin  -Keep Mg>2  -Seizure precautions     71y Male with PMHx of insulin-dependent diabetes mellitus complicated by diabetic foot ulcers s/p amputation of half of left great toe, entire right great toe, right 3rd toe, right 4th toe, peripheral vascular disease, hypertension, hyperlipidemia, GERD, and depression who presented to ED as a code stroke for AMS, aphasia and dysarthria followed with abnormal shaking of right upper extremity. NIHSS 9. Had 2 episodes of witnessed seizure in ED.  CTH negative, CTA head and neck was non diagnostic. Patient was loaded with Keppra 40 mg/kg. ED attending spoke to tele stroke attending - Not a TPA candidate.    Impression:  Patient with AMS, aphasia and dysarthria with new onset of seizure. Seizure is likely triggered by an underlying metabolic process (glucose >1000), though concurrent neurologic event such as stroke cannot be excluded given presentation.  -Will load with additional 2g of Keppra (for total of 3 g) as patient might be in status  -CTH and CTA negative  -VEEG in am   -MR brain non contrast  -c/w ASA 81mg and statin  -Keep Mg>2  -Seizure precautions

## 2021-08-24 NOTE — H&P ADULT - ATTENDING COMMENTS
Render Risk Assessment In Note?: no Detail Level: Simple Additional Notes: No charge visit.  The dark spots will fade over time. events noted, sp seizure, intubation for airway protection Hyperglycemia/ HHS/ renal failure    - Neuro:  propofol/ MRI/ Keppra/ Neuro f/up VEEG  - PUl : 450/16/50/5 abg stat  - CV: trend LA, IVF, echo  - GI: feeding  - ID: pancx, procal dc abx  - f/up CMP  - MICU

## 2021-08-24 NOTE — H&P ADULT - NSHPLABSRESULTS_GEN_ALL_CORE
LABS:                          13.4   7.54  )-----------( 180      ( 24 Aug 2021 01:15 )             42.2     Hb Trend: 13.4<--  WBC Trend: 7.54<--  Plt Trend: 180<--              128<L>  |  87<L>  |  30<H>  ----------------------------<  1052<HH>  5.5<H>   |  19  |  1.7<H>    Ca    9.7      24 Aug 2021 01:15    TPro  7.0  /  Alb  4.7  /  TBili  0.7  /  DBili  x   /  AST  22  /  ALT  22  /  AlkPhos  150<H>      Troponin T, Serum: <0.01 ng/mL (21 @ 01:15)    PT/INR - ( 24 Aug 2021 01:15 )   PT: 10.20 sec;   INR: 0.89 ratio         PTT - ( 24 Aug 2021 01:15 )  PTT:29.1 sec  Urinalysis Basic - ( 24 Aug 2021 02:47 )    Color: Colorless / Appearance: Clear / S.028 / pH: x  Gluc: x / Ketone: Small  / Bili: Negative / Urobili: <2 mg/dL   Blood: x / Protein: Negative / Nitrite: Negative   Leuk Esterase: Negative / RBC: x / WBC x   Sq Epi: x / Non Sq Epi: x / Bacteria: x      CAPILLARY BLOOD GLUCOSE  600 (24 Aug 2021 01:35)      POCT Blood Glucose.: 508 mg/dL (24 Aug 2021 04:30)  POCT Blood Glucose.: >600 mg/dL (24 Aug 2021 03:32)  POCT Blood Glucose.: >600 mg/dL (24 Aug 2021 00:42)          IMAGING:

## 2021-08-24 NOTE — H&P ADULT - HISTORY OF PRESENT ILLNESS
70 y/o M with pmh of dm, osteomyelitis, gerd, depression, hld, htn presenting to the ED for above cc.   the patient was doing last night until he was unable to talk and walk independently. As per his ex wife the right sided then started shaking. She called 911.   On presentation the patient had status epilepticus and he received 4 mg of lorazepam.   CT brain done and an acute stroke could not be excluded because of technical difficulties.  His blood glucose was found to be more than 600.   he is admitted for HHS   70 y/o M with pmh of dm, osteomyelitis, gerd, depression, hld, htn presenting to the ED for above cc.   the patient was doing last night until he was unable to talk and walk independently. As per his ex wife the right sided then started shaking. She called 911.   On presentation the patient had status epilepticus and he received 4 mg of lorazepam.   CT brain done and an acute stroke could not be excluded because of technical difficulties.  His blood glucose was found to be more than 600.   he is admitted for [facility]    the ex wife said that he has bipolar and he was on a medication, although this is not found in the chart. She also does not recall his meds 72 y/o M with pmh of dm, osteomyelitis, gerd, depression, hld, htn presenting to the ED for above cc.   the patient was doing last night until he was unable to talk and walk independently. As per his ex wife the right sided then started shaking. She called 911.   On presentation the patient had status epilepticus and he received 4 mg of lorazepam.   CT brain done and an acute stroke could not be excluded because of technical difficulties.  His blood glucose was found to be more than 600.   he is admitted for [facility]    the ex wife said that he has bipolar and he was on a medication, although this is not found in the chart. She also does not recall his meds  sp intubation for airway protection, on propofol, INSULI, 1/2 ns 150 cc/h

## 2021-08-24 NOTE — CONSULT NOTE ADULT - SUBJECTIVE AND OBJECTIVE BOX
**STROKE CODE CONSULT NOTE**    Last known well time/Time of onset of symptoms: 10:38 PM    HPI: 71y Male with PMHx of of insulin-dependent diabetes mellitus complicated by diabetic foot ulcers s/p amputation of half of left great toe, entire right great toe, right 3rd toe, right 4th toe, peripheral vascular disease, hypertension, hyperlipidemia, GERD, and depression who presented to ED as a code stroke for AMS, and aphasia. History taken from wife at the bedside, patient was in his usual state of health sitting in the chair after they came back from [place] when the wife noted that he is slurring his speech, weaker over the right UE, and then he started to have abnormal shaking of his right hand that lasted for about a minute, afterwards patient was sleepy, and confused. He was able to answer few questions but his speech was slurred, couldn't get up so EMS were called. On presentation NIHSS 9. In CT scan patient had 2 witnessed seizure episodes, one first over the left leg that lasted few seconds, then he had GTC seizure that lasted for >1 min requiring Ativan 2 mg x2. Patient noted to have urinary incontinence. CTA done was non diagnostic. Labs significant for glucose 1052, Creat 1.7,  (corrected to glucose), beta hydroxy butyrate 1.2. Patient was loaded with Keppra 40 mg/kg.  As per the wife, patient had no headache, no recent illness, no fever, chills, numbness, urinary symptoms.    T(C): 36.6 (08-24-21 @ 01:22), Max: 36.6 (08-24-21 @ 01:22)  HR: 109 (08-24-21 @ 01:22) (109 - 109)  BP: 157/70 (08-24-21 @ 01:22) (157/70 - 157/70)  RR: 16 (08-24-21 @ 01:22) (16 - 16)  SpO2: 98% (08-24-21 @ 01:22) (98% - 98%)    PAST MEDICAL & SURGICAL HISTORY:  DM (diabetes mellitus)  12/27/18 hgb aic  11.2    HTN (hypertension)    Dyslipidemia    Diabetic foot ulcer    Depression    GERD (gastroesophageal reflux disease)    Diabetes    Toe amputation status, right  (2008)    History of amputation of toe  LT -partial 1st toe        FAMILY HISTORY:  Family history of lung cancer (Mother)    Family history of ischemic heart disease (IHD) (Father)        SOCIAL HISTORY:   Patient lives with wife  Smoking status: None  Drinking: none  Drug Use: None    ROS:   Constitutional: No fever, weight loss or fatigue  Eyes: No eye pain, visual disturbances, or discharge  ENMT:  No difficulty hearing, tinnitus; Throat pain  Neck: No pain or stiffness  Respiratory: No cough, wheezing, chills or hemoptysis  Cardiovascular: No chest pain, palpitations, shortness of breath, or leg swelling  Gastrointestinal: No abdominal pain. No nausea, vomiting or hematemesis; No diarrhea or constipation. Nohematochezia.  Genitourinary: No dysuria, frequency, hematuria or incontinence  Neurological: As per HPI, Recent memory loss  Skin: No itching, burning, rashes or lesions   Endocrine: No heat or cold intolerance; No hair loss  Musculoskeletal: No joint pain or swelling; No muscle, back or extremity pain  Heme/Lymph: No easy bruising or bleeding gums    MEDICATIONS  (STANDING):  insulin regular Infusion 8.8 Unit(s)/Hr (8.8 mL/Hr) IV Continuous <Continuous>    MEDICATIONS  (PRN):    Allergies  No Known Allergies  Intolerances      Vital Signs Last 24 Hrs  T(C): 36.6 (24 Aug 2021 01:22), Max: 36.6 (24 Aug 2021 01:22)  T(F): 97.8 (24 Aug 2021 01:22), Max: 97.8 (24 Aug 2021 01:22)  HR: 109 (24 Aug 2021 01:22) (109 - 109)  BP: 157/70 (24 Aug 2021 01:22) (157/70 - 157/70)  BP(mean): --  RR: 16 (24 Aug 2021 01:22) (16 - 16)  SpO2: 98% (24 Aug 2021 01:22) (98% - 98%)    Physical exam:  Constitutional: Sleepy, somnolent but arousal to painful stimuli, moving all extremities, non focal    Neurologic:  -Mental status: Somnolent, arousable by painful stimuli. Doesn't follow commands aphasic  -Cranial nerves:   III, IV, VI: Intact pupillary reflex  VII: No facial asymmetry noted  VIII: Hearing is bilaterally intact to finger rub  Motor: Normal bulk, moving all extremities . Non focal      NIHSS: 9 ASPECT Score: 9    Fingerstick Blood Glucose: CAPILLARY BLOOD GLUCOSE  600 (24 Aug 2021 01:35)      POCT Blood Glucose.: >600 mg/dL (24 Aug 2021 00:42)    LABS:                        13.4   7.54  )-----------( 180      ( 24 Aug 2021 01:15 )             42.2     08-24    128<L>  |  87<L>  |  30<H>  ----------------------------<  1052<HH>  5.5<H>   |  19  |  1.7<H>    Ca    9.7      24 Aug 2021 01:15    TPro  7.0  /  Alb  4.7  /  TBili  0.7  /  DBili  x   /  AST  22  /  ALT  22  /  AlkPhos  150<H>  08-24    PT/INR - ( 24 Aug 2021 01:15 )   PT: 10.20 sec;   INR: 0.89 ratio         PTT - ( 24 Aug 2021 01:15 )  PTT:29.1 sec  CARDIAC MARKERS ( 24 Aug 2021 01:15 )  x     / <0.01 ng/mL / x     / x     / x              RADIOLOGY & ADDITIONAL STUDIES:    CT Brain Stroke Protocol (08.24.21 @ 00:59) >  No CT evidence for acute intracranial pathology.    CTA head and Neck : No LVO ( Pending official read)            -----------------------------------------------------------------------------------------------------------------  IV-tPA (Y/N):    No                               Reason IV-tPA not given: As per tele stroke attending : Not TPA candidate (No focal sensory/ motor defecit)    **STROKE CODE CONSULT NOTE**    Last known well time/Time of onset of symptoms: 10:38 PM    HPI: 71y Male with PMHx of of insulin-dependent diabetes mellitus complicated by diabetic foot ulcers s/p amputation of half of left great toe, entire right great toe, right 3rd toe, right 4th toe, peripheral vascular disease, hypertension, hyperlipidemia, GERD, and depression who presented to ED as a code stroke for AMS, and aphasia. History taken from wife at the bedside, patient was in his usual state of health sitting in the chair after they came back from [place] when the wife noted that he could not speak and was weaker over the right UE also confirmed by EMS, and then he started to have abnormal shaking that lasted for about a minute, afterwards patient was sleepy, and confused. He was able to answer few questions but his speech was slurred, couldn't get up so EMS were called. On presentation NIHSS 9. In CT scan patient had 2 witnessed seizure episodes, one first over the left leg that lasted few seconds, then he had GTC seizure that lasted for >2 min requiring Ativan 2 mg x2. Patient noted to have urinary incontinence. CTA done with no LVO and CTP was non diagnostic. Labs significant for glucose 1052, Creat 1.7,  (corrected to glucose), beta hydroxy butyrate 1.2. Patient was loaded with Keppra 40 mg/kg.  As per the wife, patient had no headache, no recent illness, no fever, chills, numbness, urinary symptoms.    T(C): 36.6 (08-24-21 @ 01:22), Max: 36.6 (08-24-21 @ 01:22)  HR: 109 (08-24-21 @ 01:22) (109 - 109)  BP: 157/70 (08-24-21 @ 01:22) (157/70 - 157/70)  RR: 16 (08-24-21 @ 01:22) (16 - 16)  SpO2: 98% (08-24-21 @ 01:22) (98% - 98%)    PAST MEDICAL & SURGICAL HISTORY:  DM (diabetes mellitus)  12/27/18 hgb aic  11.2    HTN (hypertension)    Dyslipidemia    Diabetic foot ulcer    Depression    GERD (gastroesophageal reflux disease)    Diabetes    Toe amputation status, right  (2008)    History of amputation of toe  LT -partial 1st toe        FAMILY HISTORY:  Family history of lung cancer (Mother)    Family history of ischemic heart disease (IHD) (Father)        SOCIAL HISTORY:   Patient lives with wife  Smoking status: None  Drinking: none  Drug Use: None    ROS:   Constitutional: No fever, weight loss or fatigue  Eyes: No eye pain, visual disturbances, or discharge  ENMT:  No difficulty hearing, tinnitus; Throat pain  Neck: No pain or stiffness  Respiratory: No cough, wheezing, chills or hemoptysis  Cardiovascular: No chest pain, palpitations, shortness of breath, or leg swelling  Gastrointestinal: No abdominal pain. No nausea, vomiting or hematemesis; No diarrhea or constipation. Nohematochezia.  Genitourinary: No dysuria, frequency, hematuria or incontinence  Neurological: As per HPI, Recent memory loss  Skin: No itching, burning, rashes or lesions   Endocrine: No heat or cold intolerance; No hair loss  Musculoskeletal: No joint pain or swelling; No muscle, back or extremity pain  Heme/Lymph: No easy bruising or bleeding gums    MEDICATIONS  (STANDING):  insulin regular Infusion 8.8 Unit(s)/Hr (8.8 mL/Hr) IV Continuous <Continuous>    MEDICATIONS  (PRN):    Allergies  No Known Allergies  Intolerances      Vital Signs Last 24 Hrs  T(C): 36.6 (24 Aug 2021 01:22), Max: 36.6 (24 Aug 2021 01:22)  T(F): 97.8 (24 Aug 2021 01:22), Max: 97.8 (24 Aug 2021 01:22)  HR: 109 (24 Aug 2021 01:22) (109 - 109)  BP: 157/70 (24 Aug 2021 01:22) (157/70 - 157/70)  BP(mean): --  RR: 16 (24 Aug 2021 01:22) (16 - 16)  SpO2: 98% (24 Aug 2021 01:22) (98% - 98%)    Physical exam:  Constitutional: Sleepy, somnolent but arousal to painful stimuli, moving all extremities, non focal    Neurologic:  -Mental status: Somnolent, arousable by painful stimuli. Doesn't follow commands aphasic  -Cranial nerves:   III, IV, VI: Intact pupillary reflex  VII: No facial asymmetry noted  VIII: Hearing is bilaterally intact to finger rub  Motor: Normal bulk, moving all extremities . Non focal      NIHSS: 9 ASPECT Score: 9    Fingerstick Blood Glucose: CAPILLARY BLOOD GLUCOSE  600 (24 Aug 2021 01:35)      POCT Blood Glucose.: >600 mg/dL (24 Aug 2021 00:42)    LABS:                        13.4   7.54  )-----------( 180      ( 24 Aug 2021 01:15 )             42.2     08-24    128<L>  |  87<L>  |  30<H>  ----------------------------<  1052<HH>  5.5<H>   |  19  |  1.7<H>    Ca    9.7      24 Aug 2021 01:15    TPro  7.0  /  Alb  4.7  /  TBili  0.7  /  DBili  x   /  AST  22  /  ALT  22  /  AlkPhos  150<H>  08-24    PT/INR - ( 24 Aug 2021 01:15 )   PT: 10.20 sec;   INR: 0.89 ratio         PTT - ( 24 Aug 2021 01:15 )  PTT:29.1 sec  CARDIAC MARKERS ( 24 Aug 2021 01:15 )  x     / <0.01 ng/mL / x     / x     / x              RADIOLOGY & ADDITIONAL STUDIES:    CT Brain Stroke Protocol (08.24.21 @ 00:59) >  No CT evidence for acute intracranial pathology.    CTA head and Neck : No LVO ( Pending official read)            -----------------------------------------------------------------------------------------------------------------  IV-tPA (Y/N):    No                               Reason IV-tPA not given: As per tele stroke attending : Not TPA candidate (No focal sensory/ motor defecit)

## 2021-08-24 NOTE — ED PROVIDER NOTE - PHYSICAL EXAMINATION
Physical exam unremarkable, unable to obtain a full neurological exam. perrl,mmm,Tachycardic,ctab,abd softntnd,fromx4

## 2021-08-24 NOTE — ED ADULT NURSE NOTE - OBJECTIVE STATEMENT
BIBA from home after wife noticed he was altered and shaking. Patient wife also reports slurred and difficulty walking. FS in triage >600 and patient is a known diabetic

## 2021-08-24 NOTE — CHART NOTE - NSCHARTNOTEFT_GEN_A_CORE
Was notified by MICU that patient had an active seizure, which has resolved after Versed push 4mg X2  No electrographic SE on VEEG    Recommended to increase Versed drip to 0.5-1mg as needed  Continue Keppra  Give Magnesium, keep >2  Call [phone] if clinical seizures noted        Plan was discussed with neuroattending Dr. Ontiveros Was notified by MICU that patient had an active seizure, which has resolved after Versed push 4mg X2    Recommended to increase Versed drip to 1.5 or 2 mg/kg/hour  Increase Keppra to 1000 mg BID   Propofol gtt in case of recurrence of seizure   Give Magnesium, keep >2  Call [phone] if clinical seizures noted        Plan was discussed with neuroattending Dr. Ontiveros

## 2021-08-24 NOTE — ED PROVIDER NOTE - CLINICAL SUMMARY MEDICAL DECISION MAKING FREE TEXT BOX
Pt beta hydroxy +, fluids given, VBG WNL, Neurocritical care spoken to, will admit to ICU for further evaluation.

## 2021-08-24 NOTE — H&P ADULT - ASSESSMENT
IMPRESSION:  HHS  melissa ochoa r/o stroke    PLAN:    CNS: Avoid Sedatives, VEEG, Neurology consult,MRI of brain to r/o stroke, keppra     HEENT: Oral care, HOB at 45,     PULMONARY: head of bed elevation    CARDIOVASCULAR: hold blood pressure meds    GI: GI prophylaxis,      RENAL: I=0, monitor electrolytes and replete as needed,    INFECTIOUS DISEASE: no signs of infection     HEMATOLOGICAL:  DVT prophylaxis, repeat coagulation profile, Keep Hb above 7, Active type and screen,    ENDOCRINE:  insulin infusion for hhs, hydration       TOXICOLOGY: Urine toxicology,       Basilio  Arterial line  Peripheral   Code status= full code     IMPRESSION:    HHS  seizure sp intubation for airway protection  cannot r/o stroke  renal failure    PLAN:    CNS: propofol, VEEG, Neurology consult ,MRI of brain to r/o stroke, keppra     HEENT: Oral care, HOB at 45,     PULMONARY: head of bed elevation    CARDIOVASCULAR: hold blood pressure meds    GI: GI prophylaxis,      RENAL: I=0, monitor electrolytes and replete as needed,    INFECTIOUS DISEASE: pancx    HEMATOLOGICAL:  DVT prophylaxis, repeat coagulation profile, Keep Hb above 7, Active type and screen,    ENDOCRINE:  insulin infusion for hhs, hydration       TOXICOLOGY: Urine toxicology,       Basilio  Arterial line  Peripheral   Code status= full code

## 2021-08-24 NOTE — ED PROVIDER NOTE - UNABLE TO OBTAIN
I am unable to obtain a comprehensive history, review of systems, past medical history, due to constraints imposed by the urgency of the patient's clinical condition and/or mental status. Severe Illness/Injury

## 2021-08-24 NOTE — ED PROVIDER NOTE - PROGRESS NOTE DETAILS
JK: patient having focal LLE rhythmic movements while at CT, incontinent of urine. JK: while at CT, patient had 2 episodes of tonic clonic activity, about 2 minutes in total. 2mg of ativan given x2 (4mg in total). Will re-attempt CT scan. JK: while at CT, patient had 2 episodes of tonic clonic activity, about 2 minutes in total. 2mg of ativan given. Patient noted to be agitated, soft restraints applied. Additional 2mg of ativan administered. Will re-attempt CT scan. JK: patient went back to CT scan, administered 2mg of versed for agitation.  Pending labs, VBG. Will continue to monitor.  Patient's wife currently at bedside.

## 2021-08-25 LAB
ALBUMIN SERPL ELPH-MCNC: 3.4 G/DL — LOW (ref 3.5–5.2)
ALP SERPL-CCNC: 92 U/L — SIGNIFICANT CHANGE UP (ref 30–115)
ALT FLD-CCNC: 19 U/L — SIGNIFICANT CHANGE UP (ref 0–41)
ANION GAP SERPL CALC-SCNC: 9 MMOL/L — SIGNIFICANT CHANGE UP (ref 7–14)
AST SERPL-CCNC: 25 U/L — SIGNIFICANT CHANGE UP (ref 0–41)
BILIRUB SERPL-MCNC: 0.8 MG/DL — SIGNIFICANT CHANGE UP (ref 0.2–1.2)
BUN SERPL-MCNC: 22 MG/DL — HIGH (ref 10–20)
CALCIUM SERPL-MCNC: 9.1 MG/DL — SIGNIFICANT CHANGE UP (ref 8.5–10.1)
CHLORIDE SERPL-SCNC: 105 MMOL/L — SIGNIFICANT CHANGE UP (ref 98–110)
CO2 SERPL-SCNC: 27 MMOL/L — SIGNIFICANT CHANGE UP (ref 17–32)
COVID-19 SPIKE DOMAIN AB INTERP: POSITIVE
COVID-19 SPIKE DOMAIN ANTIBODY RESULT: 75 U/ML — HIGH
CREAT SERPL-MCNC: 1.1 MG/DL — SIGNIFICANT CHANGE UP (ref 0.7–1.5)
CULTURE RESULTS: NO GROWTH — SIGNIFICANT CHANGE UP
CULTURE RESULTS: NO GROWTH — SIGNIFICANT CHANGE UP
GLUCOSE BLDC GLUCOMTR-MCNC: 133 MG/DL — HIGH (ref 70–99)
GLUCOSE BLDC GLUCOMTR-MCNC: 141 MG/DL — HIGH (ref 70–99)
GLUCOSE BLDC GLUCOMTR-MCNC: 148 MG/DL — HIGH (ref 70–99)
GLUCOSE BLDC GLUCOMTR-MCNC: 158 MG/DL — HIGH (ref 70–99)
GLUCOSE BLDC GLUCOMTR-MCNC: 159 MG/DL — HIGH (ref 70–99)
GLUCOSE BLDC GLUCOMTR-MCNC: 163 MG/DL — HIGH (ref 70–99)
GLUCOSE BLDC GLUCOMTR-MCNC: 168 MG/DL — HIGH (ref 70–99)
GLUCOSE BLDC GLUCOMTR-MCNC: 168 MG/DL — HIGH (ref 70–99)
GLUCOSE BLDC GLUCOMTR-MCNC: 171 MG/DL — HIGH (ref 70–99)
GLUCOSE BLDC GLUCOMTR-MCNC: 189 MG/DL — HIGH (ref 70–99)
GLUCOSE BLDC GLUCOMTR-MCNC: 319 MG/DL — HIGH (ref 70–99)
GLUCOSE SERPL-MCNC: 177 MG/DL — HIGH (ref 70–99)
GRAM STN FLD: SIGNIFICANT CHANGE UP
HCT VFR BLD CALC: 34.2 % — LOW (ref 42–52)
HGB BLD-MCNC: 11.3 G/DL — LOW (ref 14–18)
MAGNESIUM SERPL-MCNC: 2.1 MG/DL — SIGNIFICANT CHANGE UP (ref 1.8–2.4)
MCHC RBC-ENTMCNC: 29.3 PG — SIGNIFICANT CHANGE UP (ref 27–31)
MCHC RBC-ENTMCNC: 33 G/DL — SIGNIFICANT CHANGE UP (ref 32–37)
MCV RBC AUTO: 88.6 FL — SIGNIFICANT CHANGE UP (ref 80–94)
NRBC # BLD: 0 /100 WBCS — SIGNIFICANT CHANGE UP (ref 0–0)
PLATELET # BLD AUTO: 132 K/UL — SIGNIFICANT CHANGE UP (ref 130–400)
POTASSIUM SERPL-MCNC: 3.8 MMOL/L — SIGNIFICANT CHANGE UP (ref 3.5–5)
POTASSIUM SERPL-SCNC: 3.8 MMOL/L — SIGNIFICANT CHANGE UP (ref 3.5–5)
PROCALCITONIN SERPL-MCNC: 0.24 NG/ML — HIGH (ref 0.02–0.1)
PROT SERPL-MCNC: 4.9 G/DL — LOW (ref 6–8)
RBC # BLD: 3.86 M/UL — LOW (ref 4.7–6.1)
RBC # FLD: 13.3 % — SIGNIFICANT CHANGE UP (ref 11.5–14.5)
SARS-COV-2 IGG+IGM SERPL QL IA: 75 U/ML — HIGH
SARS-COV-2 IGG+IGM SERPL QL IA: POSITIVE
SODIUM SERPL-SCNC: 141 MMOL/L — SIGNIFICANT CHANGE UP (ref 135–146)
SPECIMEN SOURCE: SIGNIFICANT CHANGE UP
TROPONIN T SERPL-MCNC: <0.01 NG/ML — SIGNIFICANT CHANGE UP
TSH SERPL-MCNC: 0.14 UIU/ML — LOW (ref 0.27–4.2)
WBC # BLD: 9.56 K/UL — SIGNIFICANT CHANGE UP (ref 4.8–10.8)
WBC # FLD AUTO: 9.56 K/UL — SIGNIFICANT CHANGE UP (ref 4.8–10.8)

## 2021-08-25 PROCEDURE — 95720 EEG PHY/QHP EA INCR W/VEEG: CPT

## 2021-08-25 PROCEDURE — 99291 CRITICAL CARE FIRST HOUR: CPT

## 2021-08-25 PROCEDURE — 99232 SBSQ HOSP IP/OBS MODERATE 35: CPT

## 2021-08-25 PROCEDURE — 71045 X-RAY EXAM CHEST 1 VIEW: CPT | Mod: 26

## 2021-08-25 PROCEDURE — 93010 ELECTROCARDIOGRAM REPORT: CPT

## 2021-08-25 RX ORDER — ENOXAPARIN SODIUM 100 MG/ML
40 INJECTION SUBCUTANEOUS DAILY
Refills: 0 | Status: DISCONTINUED | OUTPATIENT
Start: 2021-08-25 | End: 2021-09-08

## 2021-08-25 RX ORDER — INSULIN GLARGINE 100 [IU]/ML
24 INJECTION, SOLUTION SUBCUTANEOUS EVERY MORNING
Refills: 0 | Status: DISCONTINUED | OUTPATIENT
Start: 2021-08-26 | End: 2021-08-26

## 2021-08-25 RX ORDER — MIDAZOLAM HYDROCHLORIDE 1 MG/ML
4 INJECTION, SOLUTION INTRAMUSCULAR; INTRAVENOUS ONCE
Refills: 0 | Status: DISCONTINUED | OUTPATIENT
Start: 2021-08-25 | End: 2021-08-25

## 2021-08-25 RX ORDER — FENTANYL CITRATE 50 UG/ML
1 INJECTION INTRAVENOUS
Qty: 2500 | Refills: 0 | Status: DISCONTINUED | OUTPATIENT
Start: 2021-08-25 | End: 2021-08-26

## 2021-08-25 RX ORDER — INSULIN LISPRO 100/ML
6 VIAL (ML) SUBCUTANEOUS EVERY 6 HOURS
Refills: 0 | Status: DISCONTINUED | OUTPATIENT
Start: 2021-08-25 | End: 2021-08-26

## 2021-08-25 RX ORDER — PANTOPRAZOLE SODIUM 20 MG/1
40 TABLET, DELAYED RELEASE ORAL DAILY
Refills: 0 | Status: DISCONTINUED | OUTPATIENT
Start: 2021-08-25 | End: 2021-08-30

## 2021-08-25 RX ORDER — PANTOPRAZOLE SODIUM 20 MG/1
40 TABLET, DELAYED RELEASE ORAL DAILY
Refills: 0 | Status: DISCONTINUED | OUTPATIENT
Start: 2021-08-25 | End: 2021-08-25

## 2021-08-25 RX ORDER — INSULIN GLARGINE 100 [IU]/ML
24 INJECTION, SOLUTION SUBCUTANEOUS ONCE
Refills: 0 | Status: COMPLETED | OUTPATIENT
Start: 2021-08-25 | End: 2021-08-25

## 2021-08-25 RX ORDER — CHLORHEXIDINE GLUCONATE 213 G/1000ML
1 SOLUTION TOPICAL
Refills: 0 | Status: DISCONTINUED | OUTPATIENT
Start: 2021-08-25 | End: 2021-09-09

## 2021-08-25 RX ORDER — MIDAZOLAM HYDROCHLORIDE 1 MG/ML
5 INJECTION, SOLUTION INTRAMUSCULAR; INTRAVENOUS ONCE
Refills: 0 | Status: DISCONTINUED | OUTPATIENT
Start: 2021-08-25 | End: 2021-08-25

## 2021-08-25 RX ORDER — LEVETIRACETAM 250 MG/1
1000 TABLET, FILM COATED ORAL
Refills: 0 | Status: DISCONTINUED | OUTPATIENT
Start: 2021-08-25 | End: 2021-09-02

## 2021-08-25 RX ORDER — SENNA PLUS 8.6 MG/1
2 TABLET ORAL AT BEDTIME
Refills: 0 | Status: DISCONTINUED | OUTPATIENT
Start: 2021-08-25 | End: 2021-09-09

## 2021-08-25 RX ORDER — MIDAZOLAM HYDROCHLORIDE 1 MG/ML
4 INJECTION, SOLUTION INTRAMUSCULAR; INTRAVENOUS ONCE
Refills: 0 | Status: DISCONTINUED | OUTPATIENT
Start: 2021-08-25 | End: 2021-08-27

## 2021-08-25 RX ORDER — INSULIN LISPRO 100/ML
VIAL (ML) SUBCUTANEOUS EVERY 6 HOURS
Refills: 0 | Status: DISCONTINUED | OUTPATIENT
Start: 2021-08-25 | End: 2021-08-26

## 2021-08-25 RX ORDER — POLYETHYLENE GLYCOL 3350 17 G/17G
17 POWDER, FOR SOLUTION ORAL DAILY
Refills: 0 | Status: DISCONTINUED | OUTPATIENT
Start: 2021-08-25 | End: 2021-08-28

## 2021-08-25 RX ADMIN — ENOXAPARIN SODIUM 40 MILLIGRAM(S): 100 INJECTION SUBCUTANEOUS at 17:27

## 2021-08-25 RX ADMIN — MIDAZOLAM HYDROCHLORIDE 4 MILLIGRAM(S): 1 INJECTION, SOLUTION INTRAMUSCULAR; INTRAVENOUS at 14:55

## 2021-08-25 RX ADMIN — MIDAZOLAM HYDROCHLORIDE 88.2 MG/KG/HR: 1 INJECTION, SOLUTION INTRAMUSCULAR; INTRAVENOUS at 23:32

## 2021-08-25 RX ADMIN — CHLORHEXIDINE GLUCONATE 15 MILLILITER(S): 213 SOLUTION TOPICAL at 05:37

## 2021-08-25 RX ADMIN — LEVETIRACETAM 400 MILLIGRAM(S): 250 TABLET, FILM COATED ORAL at 05:36

## 2021-08-25 RX ADMIN — SENNA PLUS 2 TABLET(S): 8.6 TABLET ORAL at 22:30

## 2021-08-25 RX ADMIN — LEVETIRACETAM 400 MILLIGRAM(S): 250 TABLET, FILM COATED ORAL at 14:14

## 2021-08-25 RX ADMIN — CEFTRIAXONE 100 MILLIGRAM(S): 500 INJECTION, POWDER, FOR SOLUTION INTRAMUSCULAR; INTRAVENOUS at 05:36

## 2021-08-25 RX ADMIN — Medication 81 MILLIGRAM(S): at 14:13

## 2021-08-25 RX ADMIN — PANTOPRAZOLE SODIUM 40 MILLIGRAM(S): 20 TABLET, DELAYED RELEASE ORAL at 14:13

## 2021-08-25 RX ADMIN — CHLORHEXIDINE GLUCONATE 15 MILLILITER(S): 213 SOLUTION TOPICAL at 17:13

## 2021-08-25 RX ADMIN — INSULIN GLARGINE 24 UNIT(S): 100 INJECTION, SOLUTION SUBCUTANEOUS at 17:28

## 2021-08-25 RX ADMIN — Medication 6 UNIT(S): at 22:29

## 2021-08-25 RX ADMIN — CHLORHEXIDINE GLUCONATE 1 APPLICATION(S): 213 SOLUTION TOPICAL at 05:37

## 2021-08-25 RX ADMIN — Medication 8: at 22:29

## 2021-08-25 RX ADMIN — ATORVASTATIN CALCIUM 20 MILLIGRAM(S): 80 TABLET, FILM COATED ORAL at 22:30

## 2021-08-25 RX ADMIN — MIDAZOLAM HYDROCHLORIDE 88.2 MG/KG/HR: 1 INJECTION, SOLUTION INTRAMUSCULAR; INTRAVENOUS at 21:08

## 2021-08-25 RX ADMIN — MIDAZOLAM HYDROCHLORIDE 88.2 MG/KG/HR: 1 INJECTION, SOLUTION INTRAMUSCULAR; INTRAVENOUS at 22:21

## 2021-08-25 RX ADMIN — POLYETHYLENE GLYCOL 3350 17 GRAM(S): 17 POWDER, FOR SOLUTION ORAL at 14:14

## 2021-08-25 RX ADMIN — LEVETIRACETAM 400 MILLIGRAM(S): 250 TABLET, FILM COATED ORAL at 22:31

## 2021-08-25 RX ADMIN — MIDAZOLAM HYDROCHLORIDE 4 MILLIGRAM(S): 1 INJECTION, SOLUTION INTRAMUSCULAR; INTRAVENOUS at 14:20

## 2021-08-25 NOTE — CONSULT NOTE ADULT - ASSESSMENT
IMP:  - seizure - r/o stroke, r/o withdrawal  - intubated for airway protection  - DKA improving    est REE ~ 2140 kcal & protein needs per CCM ~ 135 gm/d  agree with use of Glucerna 1.2 as Rx  increase Glucerna 1.2 to 70 ml/h and add 3 packets Prosource TF per day --> 132 gm pro & 2115 k/d  (note pt NOT on propofol)  check phos with am labs, joseph post large dose insulin treatments

## 2021-08-25 NOTE — PROGRESS NOTE ADULT - SUBJECTIVE AND OBJECTIVE BOX
Pt is a 71y M, w/ PMHx of   Allergies: No Known Allergies  .  Pt came in for .  Admitted for .     Hospital Stay: 1d.    SUBJECTIVE:  Overnight events:    REVIEW OF SYSTEMS:    OBJECTIVE:  VITALS:  T(F): 96.8 (08-25-21 @ 08:00), Max: 100.8 (08-24-21 @ 12:00)  HR: 60 (08-25-21 @ 10:15) (60 - 116)  BP: 98/52 (08-25-21 @ 10:15) (67/40 - 170/81)  ABP: --  ABP(mean): --  RR: 16 (08-25-21 @ 10:15) (0 - 35)  SpO2: 100% (08-25-21 @ 10:15) (99% - 100%)    Vent Settings  Device: Avea, Mode: AC/ CMV (Assist Control/ Continuous Mandatory Ventilation), RR (machine): 16, TV (machine): 420, FiO2: 35, PEEP: 5, ITime: 1, MAP: 9, PIP: 19    Blood Gas  08-25-21 @ 02:00  pH 7.48 | pCO2 37 | pO2 115 | HCO3 28 | O2 Sat 99.1  08-24-21 @ 11:29  pH 7.47 | pCO2 38 | pO2 78 | HCO3 28 | O2 Sat 96.8      Drips      I&Os:    08-24-21 @ 07:01  -  08-25-21 @ 07:00  --------------------------------------------------------  IN: 3941.9 mL / OUT: 1675 mL / NET: 2266.9 mL    08-25-21 @ 07:01  -  08-25-21 @ 10:47  --------------------------------------------------------  IN: 769.1 mL / OUT: 125 mL / NET: 644.1 mL        PHYSICAL EXAM:    ACTIVE MEDICATIONS:  acetaminophen   Tablet .. 650 milliGRAM(s) Oral every 6 hours PRN  aspirin  chewable 81 milliGRAM(s) Oral daily  atorvastatin 20 milliGRAM(s) Oral at bedtime  cefTRIAXone   IVPB 1000 milliGRAM(s) IV Intermittent once  cefTRIAXone   IVPB 1000 milliGRAM(s) IV Intermittent every 24 hours  cefTRIAXone   IVPB      chlorhexidine 0.12% Liquid 15 milliLiter(s) Oral Mucosa every 12 hours  chlorhexidine 4% Liquid 1 Application(s) Topical <User Schedule>  fentaNYL   Infusion. 1 MICROgram(s)/kG/Hr (8.82 mL/Hr) IV Continuous <Continuous>  insulin regular Infusion 8.8 Unit(s)/Hr (8.8 mL/Hr) IV Continuous <Continuous>  lactated ringers. 1000 milliLiter(s) (50 mL/Hr) IV Continuous <Continuous>  levETIRAcetam  IVPB 750 milliGRAM(s) IV Intermittent every 12 hours  midazolam Infusion. 0.5 mG/kG/Hr (44.1 mL/Hr) IV Continuous <Continuous>  norepinephrine Infusion 0.05 MICROgram(s)/kG/Min (8.27 mL/Hr) IV Continuous <Continuous>  pantoprazole   Suspension 40 milliGRAM(s) Oral daily  polyethylene glycol 3350 17 Gram(s) Oral daily  senna 2 Tablet(s) Oral at bedtime  vasopressin Infusion 0.1 Unit(s)/Min (6 mL/Hr) IV Continuous <Continuous>      RESULTS:  08-25-21 @ 04:30  WBC 9.56, H/H 11.3<L>/34.2<L>, plt 132  Na 141, K 3.8, Cl 105, CO2 27, BUN 22<H>, Cr 1.1, Glu 177<H>    Ca 9.1, Mg 2.1  TP 4.9<L>, Alb 3.4<L>, T. Bili 0.8  AST 25, ALT 19, Alk phos 92      08-24-21 @ 14:45  Na 140, K 4.3, Cl 102, CO2 26, BUN 25<H>, Cr 1.4, Glu 147<H>    Ca 9.4, Mg 2.1  TP 5.8<L>, Alb 4.0, T. Bili 0.5, D. Bili --  AST 27, ALT 22, Alk phos 95      Culture - Sputum (collected 08-24-21 @ 14:30)  Source: .Sputum Deep tracheal intubation  Gram Stain (08-25-21 @ 06:58):    Numerous polymorphonuclear leukocytes per low power field    No Squamous epithelial cells per low power field    Numerous Gram Positive Cocci in Pairs and Chains seen per oil power field    Few Gram Negative Rods seen per oil power field    Culture - Urine (collected 08-24-21 @ 06:11)  Source: Catheterized Catheterized  Final Report (08-25-21 @ 10:40):    No growth    Culture - Urine (collected 08-24-21 @ 02:47)  Source: Clean Catch Clean Catch (Midstream)  Final Report (08-25-21 @ 05:30):    No growth    PENDING RESULTS:  Procalcitonin, Serum: AM Sched. Collection: 27-Aug-2021 04:30 (08-25-21 @ 10:40)  Procalcitonin, Serum: Repeat From: 27-Aug-2021 00:00 To: 12-Sep-2021 23:59, Every 2 day(s) (08-25-21 @ 10:40)  Culture - Blood: STAT  Specimen Source: Blood-Peripheral (08-25-21 @ 10:40)  Comprehensive Metabolic Panel: AM Sched. Collection: 26-Aug-2021 04:30 (08-25-21 @ 10:40)  Comprehensive Metabolic Panel: Repeat From: 26-Aug-2021 00:00 To: 12-Sep-2021 23:59, Every 1 day(s) (08-25-21 @ 10:40)  Complete Blood Count + Automated Diff: AM Sched. Collection: 26-Aug-2021 04:30 (08-25-21 @ 10:40)  Complete Blood Count + Automated Diff: Repeat From: 26-Aug-2021 04:30 To: 12-Sep-2021 23:59, Every 1 day(s) (08-25-21 @ 10:40)  Osmolality, Serum: AM Sched. Collection: 25-Aug-2021 04:30 (08-24-21 @ 13:46)  Procalcitonin, Serum: AM Sched. Collection: 25-Aug-2021 04:30 (08-24-21 @ 13:45)  EEG w/ Video Set Up Patient Education Min 8 Channels   (08-24-21 @ 12:33)      IMAGING:  Latest imaging reviewed.    EXAM:  DUPLEX SCAN EXT VEINS LOWER BI        PROCEDURE DATE:  08/24/2021    Impression:  No evidence of deep venous thrombosis or superficial thrombophlebitis in the bilateral lower extremities.  --- End of Report ---    EXAM:  CT ANGIO BRAIN (W)AW IC        EXAM:  CT ANGIO NECK (W)AW IC        EXAM:  CT PERFUSION W MAPS IC        PROCEDURE DATE:  08/24/2021    IMPRESSION:  PERFUSION: Nondiagnostic perfusion exam secondary to a software error as detailed above.  CTA HEAD: No evidence of flow-limiting stenosis, occlusion or aneurysm.  CTA NECK: No evidence of flow-limiting stenosis, occlusion or aneurysm..  --- End of Report ---      EXAM:  CT BRAIN STROKE PROTOCOL       PROCEDURE DATE:  08/24/2021    IMPRESSION:  No CT evidence for acute intracranial pathology.  --- End of Report ---    ECHO:  EXAM:  ECHO TTE WO CON COMP W DOPP    *** ADDENDUM 08/24/2021  ***  Date of Exam:        8/24/2021 1:10:34 PM  Summary:   1. Technically suboptimal study.   2. Normal global left ventricular systolic function.   3. Left ventricular ejection fraction, by visual estimation, is 55 to 60%.   4. Normal right ventricular size and function.   5. LA volume Index is 19.0 ml/m² ml/m2.   6. Normal leftventricular internal cavity size.   7. The left ventricular diastolic function could not be assessed in this study.    PHYSICIAN INTERPRETATION:  Left Ventricle: The left ventricular internal cavity size is normal. Left ventricular wall thickness is normal. There is no left ventricular hypertrophy. Global LV systolic function was normal. Left ventricular ejection fraction, by visual estimation, is 55 to 60%. The left ventricular diastolic function could not be assessed in this study.      LV WallScoring:  All segments are normal.    *** Final (Updated) ***  *** END OF [id number]/24/2021  ***  *** Final ***    EEG  VEEG, Report 8/25 @10:45   in the last 24 hours: intubated and sedated  Background------------  very low amplitude reaching frequencies in the range of 304 hz and showing minimal reactivity. The recording also shows bursts of diffusely expressed sharply contoured theta with shifting asymmetry admixed with sharp transients and perhaps a preceding    low amplitude spike  Focal and generalized slowing------moderate to severe generalized slwoin  Interictal activity------------- as above  Events-none  Seizures----none  Impression:  abnormal as above   Plan -   discussed  with the team Pt is a 71y M, w/ PMHx of DM, HTN, DLD,   Allergies: No Known Allergies  Pt came in for .  Admitted for .     Attending Contribution to Care: 71-year-old male presents to the ED with acute onset confusion.  Patient symptoms started 1.5 hours prior to arrival.  On that initial evaluation by family, patient was noted to have right-sided weakness with clonus activity subsequently.  EMS noted patient's mental status to deteriorate enroute.  On ED evaluation patient ANO x0, eyes opening spontaneously, not following commands.  Unable to obtain proper neurological exam.  Fingerstick greater than 600.  Stroke code activated.  Patient taken to CT.  In CT patient noted to have a tonic-clonic seizure episode.  Ativan 2 mg given and brought back to the critical care area.  Patient subsequently noted to be agitated, restrained given additional Ativan.  Initial CT reading negative for acute intracranial hemorrhage, infarct, or mass.  Neurology provider Gary at bedside, case conveyed to telestroke attending- Not a TPA candidate.  Initial vitals hypertensive, tachycardic, afebrile rectally.   Physical exam unremarkable, unable to obtain a full neurological exam. perrl,mmm,Tachycardic,ctab,abd softntnd,fromx4  Stroke versus metabolic.  is admitted for HHS    the ex wife said that he has bipolar and he was on a medication, although this is not found in the chart. She also does not recall his meds  sp intubation for airway protection, on propofol, INSULI, 1/2 ns 150 cc/h      Hospital Stay: 1d.    SUBJECTIVE:  Overnight events:    REVIEW OF SYSTEMS:    OBJECTIVE:  VITALS:  T(F): 96.8 (08-25-21 @ 08:00), Max: 100.8 (08-24-21 @ 12:00)  HR: 60 (08-25-21 @ 10:15) (60 - 116)  BP: 98/52 (08-25-21 @ 10:15) (67/40 - 170/81)  ABP: --  ABP(mean): --  RR: 16 (08-25-21 @ 10:15) (0 - 35)  SpO2: 100% (08-25-21 @ 10:15) (99% - 100%)    Vent Settings  Device: Avea, Mode: AC/ CMV (Assist Control/ Continuous Mandatory Ventilation), RR (machine): 16, TV (machine): 420, FiO2: 35, PEEP: 5, ITime: 1, MAP: 9, PIP: 19    Blood Gas  08-25-21 @ 02:00  pH 7.48 | pCO2 37 | pO2 115 | HCO3 28 | O2 Sat 99.1  08-24-21 @ 11:29  pH 7.47 | pCO2 38 | pO2 78 | HCO3 28 | O2 Sat 96.8      Drips      I&Os:    08-24-21 @ 07:01  -  08-25-21 @ 07:00  --------------------------------------------------------  IN: 3941.9 mL / OUT: 1675 mL / NET: 2266.9 mL    08-25-21 @ 07:01  -  08-25-21 @ 10:47  --------------------------------------------------------  IN: 769.1 mL / OUT: 125 mL / NET: 644.1 mL        PHYSICAL EXAM:    ACTIVE MEDICATIONS:  acetaminophen   Tablet .. 650 milliGRAM(s) Oral every 6 hours PRN  aspirin  chewable 81 milliGRAM(s) Oral daily  atorvastatin 20 milliGRAM(s) Oral at bedtime  cefTRIAXone   IVPB 1000 milliGRAM(s) IV Intermittent once  cefTRIAXone   IVPB 1000 milliGRAM(s) IV Intermittent every 24 hours  cefTRIAXone   IVPB      chlorhexidine 0.12% Liquid 15 milliLiter(s) Oral Mucosa every 12 hours  chlorhexidine 4% Liquid 1 Application(s) Topical <User Schedule>  fentaNYL   Infusion. 1 MICROgram(s)/kG/Hr (8.82 mL/Hr) IV Continuous <Continuous>  insulin regular Infusion 8.8 Unit(s)/Hr (8.8 mL/Hr) IV Continuous <Continuous>  lactated ringers. 1000 milliLiter(s) (50 mL/Hr) IV Continuous <Continuous>  levETIRAcetam  IVPB 750 milliGRAM(s) IV Intermittent every 12 hours  midazolam Infusion. 0.5 mG/kG/Hr (44.1 mL/Hr) IV Continuous <Continuous>  norepinephrine Infusion 0.05 MICROgram(s)/kG/Min (8.27 mL/Hr) IV Continuous <Continuous>  pantoprazole   Suspension 40 milliGRAM(s) Oral daily  polyethylene glycol 3350 17 Gram(s) Oral daily  senna 2 Tablet(s) Oral at bedtime  vasopressin Infusion 0.1 Unit(s)/Min (6 mL/Hr) IV Continuous <Continuous>      RESULTS:  08-25-21 @ 04:30  WBC 9.56, H/H 11.3<L>/34.2<L>, plt 132  Na 141, K 3.8, Cl 105, CO2 27, BUN 22<H>, Cr 1.1, Glu 177<H>    Ca 9.1, Mg 2.1  TP 4.9<L>, Alb 3.4<L>, T. Bili 0.8  AST 25, ALT 19, Alk phos 92      08-24-21 @ 14:45  Na 140, K 4.3, Cl 102, CO2 26, BUN 25<H>, Cr 1.4, Glu 147<H>    Ca 9.4, Mg 2.1  TP 5.8<L>, Alb 4.0, T. Bili 0.5, D. Bili --  AST 27, ALT 22, Alk phos 95      Culture - Sputum (collected 08-24-21 @ 14:30)  Source: .Sputum Deep tracheal intubation  Gram Stain (08-25-21 @ 06:58):    Numerous polymorphonuclear leukocytes per low power field    No Squamous epithelial cells per low power field    Numerous Gram Positive Cocci in Pairs and Chains seen per oil power field    Few Gram Negative Rods seen per oil power field    Culture - Urine (collected 08-24-21 @ 06:11)  Source: Catheterized Catheterized  Final Report (08-25-21 @ 10:40):    No growth    Culture - Urine (collected 08-24-21 @ 02:47)  Source: Clean Catch Clean Catch (Midstream)  Final Report (08-25-21 @ 05:30):    No growth    PENDING RESULTS:  Procalcitonin, Serum: AM Sched. Collection: 27-Aug-2021 04:30 (08-25-21 @ 10:40)  Procalcitonin, Serum: Repeat From: 27-Aug-2021 00:00 To: 12-Sep-2021 23:59, Every 2 day(s) (08-25-21 @ 10:40)  Culture - Blood: STAT  Specimen Source: Blood-Peripheral (08-25-21 @ 10:40)  Comprehensive Metabolic Panel: AM Sched. Collection: 26-Aug-2021 04:30 (08-25-21 @ 10:40)  Comprehensive Metabolic Panel: Repeat From: 26-Aug-2021 00:00 To: 12-Sep-2021 23:59, Every 1 day(s) (08-25-21 @ 10:40)  Complete Blood Count + Automated Diff: AM Sched. Collection: 26-Aug-2021 04:30 (08-25-21 @ 10:40)  Complete Blood Count + Automated Diff: Repeat From: 26-Aug-2021 04:30 To: 12-Sep-2021 23:59, Every 1 day(s) (08-25-21 @ 10:40)  Osmolality, Serum: AM Sched. Collection: 25-Aug-2021 04:30 (08-24-21 @ 13:46)  Procalcitonin, Serum: AM Sched. Collection: 25-Aug-2021 04:30 (08-24-21 @ 13:45)  EEG w/ Video Set Up Patient Education Min 8 Channels   (08-24-21 @ 12:33)      IMAGING:  Latest imaging reviewed.    EXAM:  DUPLEX SCAN EXT VEINS LOWER BI        PROCEDURE DATE:  08/24/2021    Impression:  No evidence of deep venous thrombosis or superficial thrombophlebitis in the bilateral lower extremities.  --- End of Report ---    EXAM:  CT ANGIO BRAIN (W)AW IC        EXAM:  CT ANGIO NECK (W)AW IC        EXAM:  CT PERFUSION W MAPS IC        PROCEDURE DATE:  08/24/2021    IMPRESSION:  PERFUSION: Nondiagnostic perfusion exam secondary to a software error as detailed above.  CTA HEAD: No evidence of flow-limiting stenosis, occlusion or aneurysm.  CTA NECK: No evidence of flow-limiting stenosis, occlusion or aneurysm..  --- End of Report ---      EXAM:  CT BRAIN STROKE PROTOCOL       PROCEDURE DATE:  08/24/2021    IMPRESSION:  No CT evidence for acute intracranial pathology.  --- End of Report ---    ECHO:  EXAM:  ECHO TTE WO CON COMP W DOPP    *** ADDENDUM 08/24/2021  ***  Date of Exam:        8/24/2021 1:10:34 PM  Summary:   1. Technically suboptimal study.   2. Normal global left ventricular systolic function.   3. Left ventricular ejection fraction, by visual estimation, is 55 to 60%.   4. Normal right ventricular size and function.   5. LA volume Index is 19.0 ml/m² ml/m2.   6. Normal leftventricular internal cavity size.   7. The left ventricular diastolic function could not be assessed in this study.    PHYSICIAN INTERPRETATION:  Left Ventricle: The left ventricular internal cavity size is normal. Left ventricular wall thickness is normal. There is no left ventricular hypertrophy. Global LV systolic function was normal. Left ventricular ejection fraction, by visual estimation, is 55 to 60%. The left ventricular diastolic function could not be assessed in this study.      LV WallScoring:  All segments are normal.    *** Final (Updated) ***  *** END OF [id number]/24/2021  ***  *** Final ***    EEG  VEEG, Report 8/25 @10:45   in the last 24 hours: intubated and sedated  Background------------  very low amplitude reaching frequencies in the range of 304 hz and showing minimal reactivity. 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 No Known Allergies  Pt came in for acute onset confusion, 1.5 hours prior to arrival.   On the initial evaluation by family, patient was noted to have right-sided weakness with clonus activity subsequently.  EMS noted patient's mental status to deteriorate en route.   On ED evaluation patient ANOx0, eyes opening spontaneously, not following commands.  Unable to obtain proper neurological exam. Stroke code activated.  Patient taken to CT.  In CT patient noted to have a tonic-clonic seizure episode.  Ativan 2 mg given and brought back to the critical care area.  Patient subsequently noted to be agitated, restrained given additional Ativan.  Initial CT reading negative for acute intracranial hemorrhage, infarct, or mass. With neurology provider at bedside, the case conveyed to tele-stroke attending; pt was not a TPA candidate. Pt was intubated for airway protection.   Initial vitals hypertensive, tachycardic, afebrile rectally, fingerstick >600.  Admitted for stroke versus metabolic encephalopathy, and HHS.     Hospital Stay: 1d.    SUBJECTIVE:  Overnight events: noted    REVIEW OF SYSTEMS: Unable to obtain    OBJECTIVE:  VITALS:  T(F): 96.8 (08-25-21 @ 08:00), Max: 100.8 (08-24-21 @ 12:00)  HR: 60 (08-25-21 @ 10:15) (60 - 116)  BP: 98/52 (08-25-21 @ 10:15) (67/40 - 170/81)  RR: 16 (08-25-21 @ 10:15) (0 - 35)  SpO2: 100% (08-25-21 @ 10:15) (99% - 100%)    Vent Settings  Device: Avea, Mode: AC/ CMV (Assist Control/ Continuous Mandatory Ventilation), RR (machine): 16, TV (machine): 420, FiO2: 35, PEEP: 5, ITime: 1, MAP: 9, PIP: 19    Blood Gas  08-25-21 @ 02:00  pH 7.48 | pCO2 37 | pO2 115 | HCO3 28 | O2 Sat 99.1  08-24-21 @ 11:29  pH 7.47 | pCO2 38 | pO2 78 | HCO3 28 | O2 Sat 96.8      Drips      I&Os:    08-24-21 @ 07:01  -  08-25-21 @ 07:00  --------------------------------------------------------  IN: 3941.9 mL / OUT: 1675 mL / NET: 2266.9 mL    08-25-21 @ 07:01  - 08-25-21 @ 10:47  --------------------------------------------------------  IN: 769.1 mL / OUT: 125 mL / NET: 644.1 mL      PHYSICAL EXAM:  GEN: Sedated, NAD  SKIN: Warm, dry  HEAD: NCAT  EYES: PER B/L, non-icteric, no conjunctival pallor  ENT: Patent airway  NECK: Midline trachea  CARDIO: RRR, sinus; no JVD  CHEST: B/L chest rise and fall, CTAB  ABD: Soft, NT, ND, BS+  NEURO:Sedated    ACTIVE MEDICATIONS:  acetaminophen   Tablet .. 650 milliGRAM(s) Oral every 6 hours PRN  aspirin  chewable 81 milliGRAM(s) Oral daily  atorvastatin 20 milliGRAM(s) Oral at bedtime  cefTRIAXone   IVPB 1000 milliGRAM(s) IV Intermittent once  cefTRIAXone   IVPB 1000 milliGRAM(s) IV Intermittent every 24 hours  cefTRIAXone   IVPB      chlorhexidine 0.12% Liquid 15 milliLiter(s) Oral Mucosa every 12 hours  chlorhexidine 4% Liquid 1 Application(s) Topical <User Schedule>  fentaNYL   Infusion. 1 MICROgram(s)/kG/Hr (8.82 mL/Hr) IV Continuous <Continuous>  insulin regular Infusion 8.8 Unit(s)/Hr (8.8 mL/Hr) IV Continuous <Continuous>  lactated ringers. 1000 milliLiter(s) (50 mL/Hr) IV Continuous <Continuous>  levETIRAcetam  IVPB 750 milliGRAM(s) IV Intermittent every 12 hours  midazolam Infusion. 0.5 mG/kG/Hr (44.1 mL/Hr) IV Continuous <Continuous>  norepinephrine Infusion 0.05 MICROgram(s)/kG/Min (8.27 mL/Hr) IV Continuous <Continuous>  pantoprazole   Suspension 40 milliGRAM(s) Oral daily  polyethylene glycol 3350 17 Gram(s) Oral daily  senna 2 Tablet(s) Oral at bedtime  vasopressin Infusion 0.1 Unit(s)/Min (6 mL/Hr) IV Continuous <Continuous>      RESULTS:  08-25-21 @ 04:30  WBC 9.56 | H/H 11.3<L>/34.2<L> | Plt 132  Na 141 | K 3.8 | Cl 105 | CO2 27 | BUN 22<H> | Cr 1.1 | Glu 177<H>  Ca 9.1 | Mg 2.1    AST 25 | ALT 19 | Alk Phos 92  Alb 3.4<L> | TP 4.9<L> | T. Bili 0.8 | D. Bili --    Procalcitonin 0.24<H>    TSH 0.14<L>    08-24-21 @ 14:45  Na 140 | K 4.3 | Cl 102 | CO2 26 | BUN 25<H> | Cr 1.4 | Glu 147<H>  Ca 9.4 | Mg 2.1    AST 27 | ALT 22 | Alk Phos 95  Alb 4.0 | TP 5.8<L> | T. Bili 0.5 | D. Bili --    Culture - Sputum (collected 08-24-21 @ 14:30)  Source: .Sputum Deep tracheal intubation  Gram Stain:    Numerous polymorphonuclear leukocytes per low power field    No Squamous epithelial cells per low power field    Numerous Gram Positive Cocci in Pairs and Chains seen per oil power field    Few Gram Negative Rods seen per oil power field  Preliminary Report:    Normal Respiratory Chelsy present    Culture - Urine (collected 08-24-21 @ 06:11)  Source: Catheterized Catheterized  Final Report:    No growth    Culture - Urine (collected 08-24-21 @ 02:47)  Source: Clean Catch Clean Catch (Midstream)  Final Report:    No growth        PENDING RESULTS:  insulin glargine Injectable (LANTUS):   24 Unit(s), SubCutaneous, every morning  Administration Instructions: refrigerate  NOT to be mixed with other insulins  Dispose unused medication in BLACK bin.  This is a High Alert Medication. (08-25-21 @ 14:58)  A1C with Estimated Average Glucose: AM Sched. Collection: 26-Aug-2021 04:30 (08-25-21 @ 14:58)  Procalcitonin, Serum: AM Sched. Collection: 27-Aug-2021 04:30 (08-25-21 @ 10:40)  Procalcitonin, Serum: Repeat From: 27-Aug-2021 00:00 To: 12-Sep-2021 23:59, Every 2 day(s) (08-25-21 @ 10:40)  Culture - Blood: STAT  Specimen Source: Blood-Peripheral (08-25-21 @ 10:40)  Comprehensive Metabolic Panel: AM Sched. Collection: 26-Aug-2021 04:30 (08-25-21 @ 10:40)  Comprehensive Metabolic Panel: Repeat From: 26-Aug-2021 00:00 To: 12-Sep-2021 23:59, Every 1 day(s) (08-25-21 @ 10:40)  Complete Blood Count + Automated Diff: AM Sched. Collection: 26-Aug-2021 04:30 (08-25-21 @ 10:40)  Complete Blood Count + Automated Diff: Repeat From: 26-Aug-2021 04:30 To: 12-Sep-2021 23:59, Every 1 day(s) (08-25-21 @ 10:40)    RESULTS:  08-25-21 @ 04:30  WBC 9.56, H/H 11.3<L>/34.2<L>, plt 132  Na 141, K 3.8, Cl 105, CO2 27, BUN 22<H>, Cr 1.1, Glu 177<H>    Ca 9.1, Mg 2.1  TP 4.9<L>, Alb 3.4<L>, T. Bili 0.8  AST 25, ALT 19, Alk phos 92      08-24-21 @ 14:45  Na 140, K 4.3, Cl 102, CO2 26, BUN 25<H>, Cr 1.4, Glu 147<H>    Ca 9.4, Mg 2.1  TP 5.8<L>, Alb 4.0, T. Bili 0.5, D. Bili --  AST 27, ALT 22, Alk phos 95      Culture - Sputum (collected 08-24-21 @ 14:30)  Source: .Sputum Deep tracheal intubation  Gram Stain (08-25-21 @ 06:58):    Numerous polymorphonuclear leukocytes per low power field    No Squamous epithelial cells per low power field    Numerous Gram Positive Cocci in Pairs and Chains seen per oil power field    Few Gram Negative Rods seen per oil power field    Culture - Urine (collected 08-24-21 @ 06:11)  Source: Catheterized Catheterized  Final Report (08-25-21 @ 10:40):    No growth    Culture - Urine (collected 08-24-21 @ 02:47)  Source: Clean Catch Clean Catch (Midstream)  Final Report (08-25-21 @ 05:30):    No growth    PENDING RESULTS:  Procalcitonin, Serum: AM Sched. Collection: 27-Aug-2021 04:30 (08-25-21 @ 10:40)  Procalcitonin, Serum: Repeat From: 27-Aug-2021 00:00 To: 12-Sep-2021 23:59, Every 2 day(s) (08-25-21 @ 10:40)  Culture - Blood: STAT  Specimen Source: Blood-Peripheral (08-25-21 @ 10:40)  Comprehensive Metabolic Panel: AM Sched. Collection: 26-Aug-2021 04:30 (08-25-21 @ 10:40)  Comprehensive Metabolic Panel: Repeat From: 26-Aug-2021 00:00 To: 12-Sep-2021 23:59, Every 1 day(s) (08-25-21 @ 10:40)  Complete Blood Count + Automated Diff: AM Sched. Collection: 26-Aug-2021 04:30 (08-25-21 @ 10:40)  Complete Blood Count + Automated Diff: Repeat From: 26-Aug-2021 04:30 To: 12-Sep-2021 23:59, Every 1 day(s) (08-25-21 @ 10:40)  Osmolality, Serum: AM Sched. Collection: 25-Aug-2021 04:30 (08-24-21 @ 13:46)  Procalcitonin, Serum: AM Sched. Collection: 25-Aug-2021 04:30 (08-24-21 @ 13:45)  EEG w/ Video Set Up Patient Education Min 8 Channels   (08-24-21 @ 12:33)      IMAGING:  Latest imaging reviewed.    EXAM:  DUPLEX SCAN EXT VEINS LOWER BI        PROCEDURE DATE:  08/24/2021    Impression:  No evidence of deep venous thrombosis or superficial thrombophlebitis in the bilateral lower extremities.  --- End of Report ---    EXAM:  CT ANGIO BRAIN (W)AW IC        EXAM:  CT ANGIO NECK (W)AW IC        EXAM:  CT PERFUSION W MAPS IC        PROCEDURE DATE:  08/24/2021    IMPRESSION:  PERFUSION: Nondiagnostic perfusion exam secondary to a software error as detailed above.  CTA HEAD: No evidence of flow-limiting stenosis, occlusion or aneurysm.  CTA NECK: No evidence of flow-limiting stenosis, occlusion or aneurysm..  --- End of Report ---      EXAM:  CT BRAIN STROKE PROTOCOL       PROCEDURE DATE:  08/24/2021    IMPRESSION:  No CT evidence for acute intracranial pathology.  --- End of Report ---    ECHO:  EXAM:  ECHO TTE WO CON COMP W DOPP    *** ADDENDUM 08/24/2021  ***  Date of Exam:        8/24/2021 1:10:34 PM  Summary:   1. Technically suboptimal study.   2. Normal global left ventricular systolic function.   3. Left ventricular ejection fraction, by visual estimation, is 55 to 60%.   4. Normal right ventricular size and function.   5. LA volume Index is 19.0 ml/m² ml/m2.   6. Normal leftventricular internal cavity size.   7. The left ventricular diastolic function could not be assessed in this study.    PHYSICIAN INTERPRETATION:  Left Ventricle: The left ventricular internal cavity size is normal. Left ventricular wall thickness is normal. There is no left ventricular hypertrophy. Global LV systolic function was normal. Left ventricular ejection fraction, by visual estimation, is 55 to 60%. The left ventricular diastolic function could not be assessed in this study.      LV WallScoring:  All segments are normal.    *** Final (Updated) ***  *** END OF CFAJKXKT96/24/2021  ***  *** Final ***    EEG  VEEG, Report 8/25 @10:45   in the last 24 hours: intubated and sedated  Background------------  very low amplitude reaching frequencies in the range of 304 hz and showing minimal reactivity. The recording also shows bursts of diffusely expressed sharply contoured theta with shifting asymmetry admixed with sharp transients and perhaps a preceding    low amplitude spike  Focal and generalized slowing------moderate to severe generalized slwoin  Interictal activity------------- as above  Events-none  Seizures----none  Impression:  abnormal as above   Plan -   discussed  with the team

## 2021-08-25 NOTE — CHART NOTE - NSCHARTNOTEFT_GEN_A_CORE
Spoke with patient's daughter Echo Arroyo on the phone ([phone]). She is aware he has osteomyelitis and diabetes but says he has no seizure history. She does not think he uses any illicit substances but says he seemed more stressed out recently. She has two more sisters (Van, Martha) and a brother. She does not communicate with the ex-wife (not her mother) who has been at bedside. Per ex-wife, patient uses benzos/other street drugs. Utox is pending. Unclear who is HCP.

## 2021-08-25 NOTE — PROGRESS NOTE ADULT - ASSESSMENT
Intubated on 8/24  A-line || TLC: yes, 8/24  Basilio    #HHS insulin drip  #status epilepticus sp intubation for airway protection  #cannot r/o stroke  #renal failure  #doubt meningoencephalitis    PLAN:    - versed VEEG, Neurology f/up ,MRI of brain to r/o stroke, keppra , LP per neuro  - taper pressors  - tube feeding  - f/u Ucx, BCx, sputum cx  f/u procal pancx, abx per ID, procal  - insulin infusion for hhs, hydration   - Urine toxicology,      ASSESSMENT:    On the initial evaluation by family, patient was noted to have right-sided weakness with clonus activity subsequently.  EMS noted patient's mental status to deteriorate en route.   On ED evaluation patient ANOx0, eyes opening spontaneously, not following commands.  Unable to obtain proper neurological exam. Stroke code activated.  Patient taken to CT.  In CT patient noted to have a tonic-clonic seizure episode.  Ativan 2 mg given and brought back to the critical care area.  Patient subsequently noted to be agitated, restrained given additional Ativan.  Initial CT reading negative for acute intracranial hemorrhage, infarct, or mass. With neurology provider at bedside, the case conveyed to tele-stroke attending; pt was not a TPA candidate. Pt was intubated for airway protection.   Initial vitals hypertensive, tachycardic, afebrile rectally, fingerstick >600.    LOS: 1d.    Intubated on 8/24  TLC: yes, 8/24    #HHS   - Glucose improved, continue to monitor FS, keep 140-180  - F/u A1c    #Status epilepticus (s/p intubation for airway protection)  - Patient still on vEEG, neuro critical care team following  - Keppra increased to 1g q8  - MRI brain w/o contrast pending  - Continuing coma/pupil checks  - Pending further vEEG reviews  - LP?  - Will wean off sedation as tolerated (h/o substance abuse as per wife at bedside)    #Encephalopathy  - F/u Ucx, sputum cx  - Draw Bcx  - F/u urine toxicology    #Cannot r/o stroke - f/u MRI  #TYLER - improving    DVT ppx - Lovenox  GI ppx  Diet - Tube feeds  Code - Full  Dispo - Pending

## 2021-08-25 NOTE — PROGRESS NOTE ADULT - ASSESSMENT
IMPRESSION:    HHS insulin drip  status epilepticus sp intubation for airway protection  cannot r/o stroke  renal failure  doubt meningoencephalitis    PLAN:    CNS: versed VEEG, Neurology f/up ,MRI of brain to r/o stroke, keppra , LP per neuro    HEENT: Oral care, HOB at 45,     PULMONARY: head of bed elevation, no changes in vent settings    CARDIOVASCULAR: echo reviewed, taper pressors    GI: GI prophylaxis,  feeding    RENAL: I=0, monitor electrolytes and replete as needed,    INFECTIOUS DISEASE: pancx, abx per ID, procal    HEMATOLOGICAL:  DVT prophylaxis    ENDOCRINE:  insulin infusion for hhs, hydration       TOXICOLOGY: Urine toxicology,       Basilio  Arterial line  Peripheral   Code status= full code

## 2021-08-25 NOTE — PROGRESS NOTE ADULT - SUBJECTIVE AND OBJECTIVE BOX
Neurology Follow up note  Patient seen and examined at bedside , he is intubated sedated on versed at 0.5mmg/kg/min Fentanyl off since 1am . Patient not responsive to commands. No brainstem reflex except for sluggish corneal reflex bilaterally. VEEG in progress. No clinical seizures overnight. Tmax 103.2        HPI:  70 y/o M with pmh of dm, osteomyelitis, gerd, depression, hld, htn presenting to the ED for above cc. the patient was doing last night until he was unable to talk and walk independently. As per his ex wife the right sided then started shaking. She called 911. On presentation the patient had status epilepticus and he received 4 mg of lorazepam. CT brain done and an acute stroke could not be excluded because of technical difficulties.His blood glucose was found to be more than 600. he is admitted for [facility]  the ex wife said that he has bipolar and he was on a medication, although this is not found in the chart. She also does not recall his meds sp intubation for airway protection, on propofol, INSULI, 1/2 ns 150 cc/h           Vital Signs Last 24 Hrs  T(C): 35.9 (25 Aug 2021 04:00), Max: 39.6 (24 Aug 2021 08:00)  T(F): 96.7 (25 Aug 2021 04:00), Max: 103.2 (24 Aug 2021 08:00)  HR: 70 (25 Aug 2021 04:30) (64 - 122)  BP: 115/59 (25 Aug 2021 04:30) (67/40 - 176/88)  BP(mean): 74 (25 Aug 2021 04:30) (44 - 121)  RR: 16 (25 Aug 2021 04:30) (0 - 35)  SpO2: 100% (25 Aug 2021 04:30) (98% - 100%)          Neurological Exam:   Mental status: Intubated and sedated not following commands  Cranial nerves: Pupils pinpoint, eyes midline, sluggish corneal reflex, absent gag  Motor:  No spontaneous extremity mvmts              No abnormal mvmts  Sensation: No response to pain in all 4 exts  Gait: deferred      Medications  acetaminophen   Tablet .. 650 milligram Oral every 6 hours PRN  aspirin  chewable 81 milligrams Oral daily  atorvastatin 20 milligram Oral at bedtime  Ceftriaxone   IVPB 1000 milligram IV Intermittent once  Ceftriaxone   IVPB 1000 milligram IV Intermittent every 24 hours  Ceftriaxone   IVPB      chlorhexidine 0.12% Liquid 15 millimeter Oral Mucosa every 12 hours  chlorhexidine 4% Liquid 1 Application(s) Topical <User Schedule>  Fentanyl   Infusion. 0.5 Microgram's IV Continuous <Continuous>  insulin regular Infusion 8.8 Unit(s)/Hr IV Continuous <Continuous>  lactated ringers. 1000 milliliter IV Continuous <Continuous>  Levetiracetam  IVPB 750 milligrams IV Intermittent every 12 hours  midazolam Infusion. 0.5 mg/kg /hr IV Continuous <Continuous>  norepinephrine Infusion 0.05 Micrograms' IV Continuous <Continuous>  pantoprazole  Injectable 40 milligrams IV Push daily  vasopressin Infusion 0.1 Unit(s)/Min IV Continuous <Continuous>      Lab                        11.3   9.56  )-----------( 132      ( 25 Aug 2021 04:30 )             34.2     08-25    141  |  105  |  22<H>  ----------------------------<  177<H>  3.8   |  27  |  1.1    Ca    9.1      25 Aug 2021 04:30  Mg     2.1     08-25    TPro  4.9<L>  /  Alb  3.4<L>  /  TBili  0.8  /  DBili  x   /  AST  25  /  ALT  19  /  AlkPhos  92  08-25        Radiology

## 2021-08-25 NOTE — EEG REPORT - NS EEG TEXT BOX
Epilepsy Attending Note:     JOSÉ MANUEL PORTER    71y Male  MRN [id number]    Vital Signs Last 24 Hrs  T(C): 36 (25 Aug 2021 08:00), Max: 38.2 (24 Aug 2021 12:00)  T(F): 96.8 (25 Aug 2021 08:00), Max: 100.8 (24 Aug 2021 12:00)  HR: 60 (25 Aug 2021 10:15) (60 - 116)  BP: 98/52 (25 Aug 2021 10:15) (67/40 - 170/81)  BP(mean): 65 (25 Aug 2021 10:15) (44 - 105)  RR: 16 (25 Aug 2021 10:15) (0 - 35)  SpO2: 100% (25 Aug 2021 10:15) (99% - 100%)                          11.3   9.56  )-----------( 132      ( 25 Aug 2021 04:30 )             34.2       08-25    141  |  105  |  22<H>  ----------------------------<  177<H>  3.8   |  27  |  1.1    Ca    9.1      25 Aug 2021 04:30  Mg     2.1     08-25    TPro  4.9<L>  /  Alb  3.4<L>  /  TBili  0.8  /  DBili  x   /  AST  25  /  ALT  19  /  AlkPhos  92  08-25      MEDICATIONS  (STANDING):  aspirin  chewable 81 milliGRAM(s) Oral daily  atorvastatin 20 milliGRAM(s) Oral at bedtime  cefTRIAXone   IVPB 1000 milliGRAM(s) IV Intermittent once  cefTRIAXone   IVPB 1000 milliGRAM(s) IV Intermittent every 24 hours  cefTRIAXone   IVPB      chlorhexidine 0.12% Liquid 15 milliLiter(s) Oral Mucosa every 12 hours  chlorhexidine 4% Liquid 1 Application(s) Topical <User Schedule>  fentaNYL   Infusion. 1 MICROgram(s)/kG/Hr (8.82 mL/Hr) IV Continuous <Continuous>  insulin regular Infusion 8.8 Unit(s)/Hr (8.8 mL/Hr) IV Continuous <Continuous>  lactated ringers. 1000 milliLiter(s) (50 mL/Hr) IV Continuous <Continuous>  levETIRAcetam  IVPB 750 milliGRAM(s) IV Intermittent every 12 hours  midazolam Infusion. 0.5 mG/kG/Hr (44.1 mL/Hr) IV Continuous <Continuous>  norepinephrine Infusion 0.05 MICROgram(s)/kG/Min (8.27 mL/Hr) IV Continuous <Continuous>  pantoprazole   Suspension 40 milliGRAM(s) Oral daily  polyethylene glycol 3350 17 Gram(s) Oral daily  senna 2 Tablet(s) Oral at bedtime  vasopressin Infusion 0.1 Unit(s)/Min (6 mL/Hr) IV Continuous <Continuous>    MEDICATIONS  (PRN):  acetaminophen   Tablet .. 650 milliGRAM(s) Oral every 6 hours PRN Temp greater or equal to 38C (100.4F)            VEEG in the last 24 hours: intubated and sedated    Background------------  very low amplitude reaching frequencies in the range of 304 hz and showing minimal reactivity. The recording also shows bursts of diffusely expressed sharply contoured theta with shifting asymmetry admixed with sharp transients and perhaps a preceding    low amplitude spike    Focal and generalized slowing------moderate to severe generalized slwoin    Interictal activity------------- as above    Events-none    Seizures----none    Impression:  abnormal as above     Plan -   discussed  with the team

## 2021-08-25 NOTE — PROGRESS NOTE ADULT - ASSESSMENT
71y Male with PMHx of insulin-dependent diabetes mellitus complicated by diabetic foot ulcers s/p amputation of half of left great toe, entire right great toe, right 3rd toe, right 4th toe, PVD, HTN, HLD, GERD, and depression who presented as a code stroke for AMS, aphasia and dysarthria followed with seizure like activity. NIHSS 9. Had 2 episodes of witnessed seizure in ED. CTH negative, CTA head and neck was non diagnostic. Currently intubated sedated on versed , not following any commands and except for sluggish corneals there is absence of brain stem response currently. Veeg in progress, no acute overnight events.    Plan  Continue VEEG monitoring , will follow up with results.  Continue Keppra 750 mg bid  -MR brain non contrast after VEEG  -c/w ASA 81mg and statin  -Keep Mg>2  -Seizure precautions

## 2021-08-25 NOTE — CHART NOTE - NSCHARTNOTEFT_GEN_A_CORE
d/w general neurology regarding patient care- decision made to transfer consultation from general neuro to neurocritical care team    -advised increase of keppra to 1g q8 for now, obtain MR brain for structural evaluation of alternate source of seizure; suspect withdrawal  -continue coma/pupils checks  -would limit other sedation as tolerates  -vEEG read reviewed; now vEEG attenuated

## 2021-08-25 NOTE — CONSULT NOTE ADULT - SUBJECTIVE AND OBJECTIVE BOX
71y Male with PMHx of of insulin-dependent diabetes mellitus complicated by diabetic foot ulcers s/p amputation of half of left great toe, entire right great toe, right 3rd toe, right 4th toe, peripheral vascular disease, hypertension, hyperlipidemia, GERD, and depression who presented to ED as a code stroke for AMS, and aphasia. History taken from wife at the bedside, patient was in his usual state of health sitting in the chair after they came back from [place] when the wife noted that he could not speak and was weaker over the right UE also confirmed by EMS, and then he started to have abnormal shaking that lasted for about a minute, afterwards patient was sleepy, and confused. He was able to answer few questions but his speech was slurred, couldn't get up so EMS were called. On presentation NIHSS 9. In CT scan patient had 2 witnessed seizure episodes, one first over the left leg that lasted few seconds, then he had GTC seizure that lasted for >2 min requiring Ativan 2 mg x2. Patient noted to have urinary incontinence. CTA done with no LVO and CTP was non diagnostic. Labs significant for glucose 1052, Creat 1.7,  (corrected to glucose), beta hydroxy butyrate 1.2. Patient was loaded with Keppra 40 mg/kg.  As per the wife, patient had no headache, no recent illness, no fever, chills, numbness, urinary symptoms.  pt intubated for airway protection. Propofol given initially, as was versed - both are off now.  pt treated for status epilepticus, DKA, and stroke can not yet be ruled out.    Vital Signs Last 24 Hrs  T(C): 36.3 (25 Aug 2021 17:00), Max: 37.2 (24 Aug 2021 20:00)  T(F): 97.3 (25 Aug 2021 17:00), Max: 99 (24 Aug 2021 20:00)    - T 103 on admission  HR: 64 (25 Aug 2021 17:15) (58 - 80)  BP: 112/56 (25 Aug 2021 17:15) (79/53 - 147/81)  BP(mean): 76 (25 Aug 2021 17:15) (57 - 112)  RR: 16 (25 Aug 2021 17:15) (0 - 35)  SpO2: 100% (25 Aug 2021 17:15) (99% - 100%)  Drug Dosing Weight  Height (cm): 175.3 (24 Aug 2021 06:35)  Weight (kg): 88.2 (24 Aug 2021 06:35)  BMI (kg/m2): 28.7 (24 Aug 2021 06:35)  BSA (m2): 2.04 (24 Aug 2021 06:35)  pt intubated, sedated with fentanyl when seen this morning  on insulin drip at 4 u/h  requiring levo  video EEG in progress  + OG Desha sump being used for feeding  + Basilio with clear urine output  abd large, soft, ND - no BM yet since admission  no LE or scrotal/ sacral edema  + right IJ TLC    MEDICATIONS  (STANDING):  aspirin  chewable 81 milliGRAM(s) Oral daily  atorvastatin 20 milliGRAM(s) Oral at bedtime  cefTRIAXone   IVPB 1000 milliGRAM(s) IV Intermittent once  cefTRIAXone   IVPB 1000 milliGRAM(s) IV Intermittent every 24 hours  chlorhexidine 0.12% Liquid 15 milliLiter(s) Oral Mucosa every 12 hours  chlorhexidine 4% Liquid 1 Application(s) Topical <User Schedule>  enoxaparin Injectable 40 milliGRAM(s) SubCutaneous daily  fentaNYL   Infusion. 1 MICROgram(s)/kG/Hr (8.82 mL/Hr) IV Continuous <Continuous>  insulin glargine Injectable (LANTUS) 24 Unit(s) SubCutaneous once  insulin lispro (ADMELOG) corrective regimen sliding scale   SubCutaneous every 6 hours  insulin lispro Injectable (ADMELOG) 6 Unit(s) SubCutaneous every 6 hours  insulin regular Infusion 8.8 Unit(s)/Hr (8.8 mL/Hr) IV Continuous <Continuous>  levETIRAcetam  IVPB 1000 milliGRAM(s) IV Intermittent <User Schedule>  midazolam Infusion. 1 mG/kG/Hr (88.2 mL/Hr) IV Continuous <Continuous>  midazolam Injectable 4 milliGRAM(s) IV Push once  norepinephrine Infusion 0.05 MICROgram(s)/kG/Min (8.27 mL/Hr) IV Continuous <Continuous>  pantoprazole   Suspension 40 milliGRAM(s) Oral daily  polyethylene glycol 3350 17 Gram(s) Oral daily  senna 2 Tablet(s) Oral at bedtime  vasopressin Infusion 0.1 Unit(s)/Min (6 mL/Hr) IV Continuous <Continuous>                        11.3   9.56  )-----------( 132      ( 25 Aug 2021 04:30 )             34.2   08-25    141  |  105  |  22<H>  ----------------------------<  177<H>             BUN 30/1.7 with K 5.5 on admission  3.8   |  27  |  1.1    Ca    9.1      25 Aug 2021 04:30  Mg     2.1     08-25    TPro  4.9<L>  /  Alb  3.4<L>  /  TBili  0.8  /  DBili  x   /  AST  25  /  ALT  19  /  AlkPhos  92  08-25  Mode: AC/ CMV (Assist Control/ Continuous Mandatory Ventilation)  RR (machine): 16  TV (machine): 420  FiO2: 35  PEEP: 5  ITime: 1  MAP: 9  PIP: 19  ABG - ( 25 Aug 2021 02:00 )  pH, Arterial: 7.48  pH, Blood: x     /  pCO2: 37    /  pO2: 115   / HCO3: 28    / Base Excess: 4.0   /  SaO2: 99.1      < from: Xray Chest 1 View- PORTABLE-Urgent (Xray Chest 1 View- PORTABLE-Urgent .) (08.25.21 @ 12:22) >  Support devices: ET tube, enteric tube are stable. Unchanged right IJ central venous catheter terminating at the cavoatrial junction.    Cardiac/mediastinum/hilum: Stable.    Lung parenchyma/Pleura: Low lung volumes. No pneumothorax.    < end of copied text >  < from: CT Angio Neck w/ IV Cont (08.24.21 @ 01:56) >  HEAD CTA:    The internal carotid arteries demonstrate normal enhancement to the intracranial circulation and [place] of Ingram.    Anterior and middle cerebral arteries demonstrate normal enhancement and arborization without intraluminal filling defect, stenosis, occlusion, aneurysm or vascular malformation.    There is a dominant right vertebral artery. The left vertebral artery predominantly terminates in the pancreatic, with a hypoplastic distal V4 segment thinning to the basilar artery. Branch vasculature of the posterior circulation are within normal limits. The posterior cerebral arteries demonstrate normal enhancement and arborization without evidence for aneurysm, stenosis, occlusion or vascular malformation. Bilateral posterior communicating arteries are present.    Visualized dural venous sinuses and deep cerebral venous structures demonstrate normal enhancement without evidence for filling defect.    < end of copied text >  < from: CT Brain Stroke Protocol (08.24.21 @ 00:59) >    Parenchymal volume is appropriate for patient age. No hydrocephalus.    No large territorial infarct, intracranial hemorrhage, extra-axial fluid collection, or midline shift.    Status post left cataract surgery. No acute osseous abnormality.    Imaged paranasal sinuses and mastoid air cavities are well aerated.    Stable left basal ganglion calcifications.    < end of copied text >

## 2021-08-25 NOTE — PROGRESS NOTE ADULT - SUBJECTIVE AND OBJECTIVE BOX
Over Night Events: events noted, still intubated, ventilated, seizure, versed 0.5, fentanyl, LR , insulin no pressors, levophed 0.02    PHYSICAL EXAM    ICU Vital Signs Last 24 Hrs  T(C): 35.9 (25 Aug 2021 04:00), Max: 38.2 (24 Aug 2021 12:00)  T(F): 96.7 (25 Aug 2021 04:00), Max: 100.8 (24 Aug 2021 12:00)  HR: 61 (25 Aug 2021 07:48) (61 - 116)  BP: 79/53 (25 Aug 2021 07:00) (67/40 - 170/81)  BP(mean): 59 (25 Aug 2021 07:00) (44 - 121)  RR: 16 (25 Aug 2021 07:00) (0 - 35)  SpO2: 100% (25 Aug 2021 07:48) (99% - 100%)      General: ill looking  HEENT: ELIZABETH             Lymph Nodes: No cervical LN , ETT  Lungs: Bilateral rhonchi  Cardiovascular: Regular   Abdomen: Soft, Positive BS  Extremities: No clubbing   Skin: Warm  Neurological: Non focal       21 @ 07:01  -  21 @ 07:00  --------------------------------------------------------  IN:    Enteral Tube Flush: 50 mL    FentaNYL: 95.8 mL    FentaNYL: 17.6 mL    Free Water: 500 mL    Glucerna: 1020 mL    Insulin: 114 mL    IV PiggyBack: 250 mL    Lactated Ringers: 700 mL    Lactated Ringers Bolus: 500 mL    Midazolam: 14.4 mL    Midazolam (Status Epilepticus): 573.3 mL    Norepinephrine: 35.3 mL    Propofol: 7.9 mL    Propofol (Status Epilepticus): 63.6 mL  Total IN: 3941.9 mL    OUT:    Indwelling Catheter - Urethral (mL): 1075 mL    Nasogastric/Oral tube (mL): 600 mL    Norepinephrine: 0 mL    Propofol: 0 mL    sodium chloride 0.45% w/ Additives: 0 mL    Vasopressin: 0 mL  Total OUT: 1675 mL    Total NET: 2266.9 mL          LABS:                          11.3   9.56  )-----------( 132      ( 25 Aug 2021 04:30 )             34.2                                               08-25    141  |  105  |  22<H>  ----------------------------<  177<H>  3.8   |  27  |  1.1    Ca    9.1      25 Aug 2021 04:30  Mg     2.1     08-25    TPro  4.9<L>  /  Alb  3.4<L>  /  TBili  0.8  /  DBili  x   /  AST  25  /  ALT  19  /  AlkPhos  92  08-25      PT/INR - ( 24 Aug 2021 01:15 )   PT: 10.20 sec;   INR: 0.89 ratio         PTT - ( 24 Aug 2021 01:15 )  PTT:29.1 sec                                       Urinalysis Basic - ( 24 Aug 2021 06:11 )    Color: Light Yellow / Appearance: Clear / S.046 / pH: x  Gluc: x / Ketone: Small  / Bili: Negative / Urobili: <2 mg/dL   Blood: x / Protein: Trace / Nitrite: Negative   Leuk Esterase: Negative / RBC: x / WBC x   Sq Epi: x / Non Sq Epi: x / Bacteria: x        CARDIAC MARKERS ( 25 Aug 2021 04:30 )  x     / <0.01 ng/mL / x     / x     / x      CARDIAC MARKERS ( 24 Aug 2021 14:45 )  x     / x     / 108 U/L / x     / x      CARDIAC MARKERS ( 24 Aug 2021 01:15 )  x     / <0.01 ng/mL / x     / x     / x                                                LIVER FUNCTIONS - ( 25 Aug 2021 04:30 )  Alb: 3.4 g/dL / Pro: 4.9 g/dL / ALK PHOS: 92 U/L / ALT: 19 U/L / AST: 25 U/L / GGT: x                                                  Culture - Sputum (collected 24 Aug 2021 14:30)  Source: .Sputum Deep tracheal intubation  Gram Stain (25 Aug 2021 06:58):    Numerous polymorphonuclear leukocytes per low power field    No Squamous epithelial cells per low power field    Numerous Gram Positive Cocci in Pairs and Chains seen per oil power field    Few Gram Negative Rods seen per oil power field    Culture - Urine (collected 24 Aug 2021 02:47)  Source: Clean Catch Clean Catch (Midstream)  Final Report (25 Aug 2021 05:30):    No growth                                                   Mode: AC/ CMV (Assist Control/ Continuous Mandatory Ventilation)  RR (machine): 16  TV (machine): 420  FiO2: 35  PEEP: 5  ITime: 1  MAP: 9  PIP: 19                                      ABG - ( 25 Aug 2021 02:00 )  pH, Arterial: 7.48  pH, Blood: x     /  pCO2: 37    /  pO2: 115   / HCO3: 28    / Base Excess: 4.0   /  SaO2: 99.1                MEDICATIONS  (STANDING):  aspirin  chewable 81 milliGRAM(s) Oral daily  atorvastatin 20 milliGRAM(s) Oral at bedtime  cefTRIAXone   IVPB 1000 milliGRAM(s) IV Intermittent once  cefTRIAXone   IVPB 1000 milliGRAM(s) IV Intermittent every 24 hours  cefTRIAXone   IVPB      chlorhexidine 0.12% Liquid 15 milliLiter(s) Oral Mucosa every 12 hours  chlorhexidine 4% Liquid 1 Application(s) Topical <User Schedule>  fentaNYL   Infusion. 1 MICROgram(s)/kG/Hr (8.82 mL/Hr) IV Continuous <Continuous>  insulin regular Infusion 8.8 Unit(s)/Hr (8.8 mL/Hr) IV Continuous <Continuous>  lactated ringers. 1000 milliLiter(s) (50 mL/Hr) IV Continuous <Continuous>  levETIRAcetam  IVPB 750 milliGRAM(s) IV Intermittent every 12 hours  midazolam Infusion. 0.5 mG/kG/Hr (44.1 mL/Hr) IV Continuous <Continuous>  norepinephrine Infusion 0.05 MICROgram(s)/kG/Min (8.27 mL/Hr) IV Continuous <Continuous>  pantoprazole  Injectable 40 milliGRAM(s) IV Push daily  vasopressin Infusion 0.1 Unit(s)/Min (6 mL/Hr) IV Continuous <Continuous>    MEDICATIONS  (PRN):  acetaminophen   Tablet .. 650 milliGRAM(s) Oral every 6 hours PRN Temp greater or equal to 38C (100.4F)      x ray reviewed

## 2021-08-26 LAB
A1C WITH ESTIMATED AVERAGE GLUCOSE RESULT: 5.7 % — HIGH (ref 4–5.6)
ALBUMIN SERPL ELPH-MCNC: 2.3 G/DL — LOW (ref 3.5–5.2)
ALBUMIN SERPL ELPH-MCNC: 2.9 G/DL — LOW (ref 3.5–5.2)
ALP SERPL-CCNC: 326 U/L — HIGH (ref 30–115)
ALP SERPL-CCNC: 98 U/L — SIGNIFICANT CHANGE UP (ref 30–115)
ALT FLD-CCNC: 16 U/L — SIGNIFICANT CHANGE UP (ref 0–41)
ALT FLD-CCNC: 536 U/L — HIGH (ref 0–41)
ANION GAP SERPL CALC-SCNC: 13 MMOL/L — SIGNIFICANT CHANGE UP (ref 7–14)
ANION GAP SERPL CALC-SCNC: 8 MMOL/L — SIGNIFICANT CHANGE UP (ref 7–14)
AST SERPL-CCNC: 15 U/L — SIGNIFICANT CHANGE UP (ref 0–41)
AST SERPL-CCNC: 242 U/L — HIGH (ref 0–41)
BASE EXCESS BLDA CALC-SCNC: 1.5 MMOL/L — SIGNIFICANT CHANGE UP (ref -2–3)
BASOPHILS # BLD AUTO: 0.04 K/UL — SIGNIFICANT CHANGE UP (ref 0–0.2)
BASOPHILS NFR BLD AUTO: 0.6 % — SIGNIFICANT CHANGE UP (ref 0–1)
BILIRUB SERPL-MCNC: 0.5 MG/DL — SIGNIFICANT CHANGE UP (ref 0.2–1.2)
BILIRUB SERPL-MCNC: 4 MG/DL — HIGH (ref 0.2–1.2)
BUN SERPL-MCNC: 13 MG/DL — SIGNIFICANT CHANGE UP (ref 10–20)
BUN SERPL-MCNC: 16 MG/DL — SIGNIFICANT CHANGE UP (ref 10–20)
CALCIUM SERPL-MCNC: 7.4 MG/DL — LOW (ref 8.5–10.1)
CALCIUM SERPL-MCNC: 8.5 MG/DL — SIGNIFICANT CHANGE UP (ref 8.5–10.1)
CHLORIDE SERPL-SCNC: 109 MMOL/L — SIGNIFICANT CHANGE UP (ref 98–110)
CHLORIDE SERPL-SCNC: 97 MMOL/L — LOW (ref 98–110)
CO2 SERPL-SCNC: 22 MMOL/L — SIGNIFICANT CHANGE UP (ref 17–32)
CO2 SERPL-SCNC: 26 MMOL/L — SIGNIFICANT CHANGE UP (ref 17–32)
CREAT SERPL-MCNC: 0.5 MG/DL — LOW (ref 0.7–1.5)
CREAT SERPL-MCNC: 1.1 MG/DL — SIGNIFICANT CHANGE UP (ref 0.7–1.5)
EOSINOPHIL # BLD AUTO: 0.17 K/UL — SIGNIFICANT CHANGE UP (ref 0–0.7)
EOSINOPHIL NFR BLD AUTO: 2.4 % — SIGNIFICANT CHANGE UP (ref 0–8)
ESTIMATED AVERAGE GLUCOSE: 117 MG/DL — HIGH (ref 68–114)
GLUCOSE BLDC GLUCOMTR-MCNC: 115 MG/DL — HIGH (ref 70–99)
GLUCOSE BLDC GLUCOMTR-MCNC: 159 MG/DL — HIGH (ref 70–99)
GLUCOSE BLDC GLUCOMTR-MCNC: 168 MG/DL — HIGH (ref 70–99)
GLUCOSE BLDC GLUCOMTR-MCNC: 190 MG/DL — HIGH (ref 70–99)
GLUCOSE BLDC GLUCOMTR-MCNC: 192 MG/DL — HIGH (ref 70–99)
GLUCOSE BLDC GLUCOMTR-MCNC: 213 MG/DL — HIGH (ref 70–99)
GLUCOSE BLDC GLUCOMTR-MCNC: 223 MG/DL — HIGH (ref 70–99)
GLUCOSE BLDC GLUCOMTR-MCNC: 265 MG/DL — HIGH (ref 70–99)
GLUCOSE BLDC GLUCOMTR-MCNC: 281 MG/DL — HIGH (ref 70–99)
GLUCOSE BLDC GLUCOMTR-MCNC: 286 MG/DL — HIGH (ref 70–99)
GLUCOSE BLDC GLUCOMTR-MCNC: 287 MG/DL — HIGH (ref 70–99)
GLUCOSE BLDC GLUCOMTR-MCNC: 306 MG/DL — HIGH (ref 70–99)
GLUCOSE BLDC GLUCOMTR-MCNC: 326 MG/DL — HIGH (ref 70–99)
GLUCOSE BLDC GLUCOMTR-MCNC: 329 MG/DL — HIGH (ref 70–99)
GLUCOSE BLDC GLUCOMTR-MCNC: 54 MG/DL — CRITICAL LOW (ref 70–99)
GLUCOSE SERPL-MCNC: 334 MG/DL — HIGH (ref 70–99)
GLUCOSE SERPL-MCNC: 343 MG/DL — HIGH (ref 70–99)
HCO3 BLDA-SCNC: 27 MMOL/L — SIGNIFICANT CHANGE UP (ref 21–28)
HCT VFR BLD CALC: 34.6 % — LOW (ref 42–52)
HGB BLD-MCNC: 11.2 G/DL — LOW (ref 14–18)
HOROWITZ INDEX BLDA+IHG-RTO: 35 — SIGNIFICANT CHANGE UP
IMM GRANULOCYTES NFR BLD AUTO: 1.1 % — HIGH (ref 0.1–0.3)
LYMPHOCYTES # BLD AUTO: 1.52 K/UL — SIGNIFICANT CHANGE UP (ref 1.2–3.4)
LYMPHOCYTES # BLD AUTO: 21.2 % — SIGNIFICANT CHANGE UP (ref 20.5–51.1)
MCHC RBC-ENTMCNC: 30.9 PG — SIGNIFICANT CHANGE UP (ref 27–31)
MCHC RBC-ENTMCNC: 32.4 G/DL — SIGNIFICANT CHANGE UP (ref 32–37)
MCV RBC AUTO: 95.6 FL — HIGH (ref 80–94)
MONOCYTES # BLD AUTO: 0.89 K/UL — HIGH (ref 0.1–0.6)
MONOCYTES NFR BLD AUTO: 12.4 % — HIGH (ref 1.7–9.3)
MRSA PCR RESULT.: NEGATIVE — SIGNIFICANT CHANGE UP
NEUTROPHILS # BLD AUTO: 4.47 K/UL — SIGNIFICANT CHANGE UP (ref 1.4–6.5)
NEUTROPHILS NFR BLD AUTO: 62.3 % — SIGNIFICANT CHANGE UP (ref 42.2–75.2)
NRBC # BLD: 0 /100 WBCS — SIGNIFICANT CHANGE UP (ref 0–0)
OSMOLALITY SERPL: 306 MOS/KG — HIGH (ref 280–301)
OSMOLALITY UR: 435 MOS/KG — SIGNIFICANT CHANGE UP (ref 50–1200)
PCO2 BLDA: 43 MMHG — SIGNIFICANT CHANGE UP (ref 35–48)
PH BLDA: 7.4 — SIGNIFICANT CHANGE UP (ref 7.35–7.45)
PLATELET # BLD AUTO: 106 K/UL — LOW (ref 130–400)
PO2 BLDA: 76 MMHG — LOW (ref 83–108)
POTASSIUM SERPL-MCNC: 4.2 MMOL/L — SIGNIFICANT CHANGE UP (ref 3.5–5)
POTASSIUM SERPL-MCNC: 4.5 MMOL/L — SIGNIFICANT CHANGE UP (ref 3.5–5)
POTASSIUM SERPL-SCNC: 4.2 MMOL/L — SIGNIFICANT CHANGE UP (ref 3.5–5)
POTASSIUM SERPL-SCNC: 4.5 MMOL/L — SIGNIFICANT CHANGE UP (ref 3.5–5)
PROT SERPL-MCNC: 4.5 G/DL — LOW (ref 6–8)
PROT SERPL-MCNC: 4.7 G/DL — LOW (ref 6–8)
RBC # BLD: 3.62 M/UL — LOW (ref 4.7–6.1)
RBC # FLD: 16.3 % — HIGH (ref 11.5–14.5)
SAO2 % BLDA: 97.7 % — SIGNIFICANT CHANGE UP (ref 94–98)
SODIUM SERPL-SCNC: 132 MMOL/L — LOW (ref 135–146)
SODIUM SERPL-SCNC: 143 MMOL/L — SIGNIFICANT CHANGE UP (ref 135–146)
SODIUM UR-SCNC: 65 MMOL/L — SIGNIFICANT CHANGE UP
WBC # BLD: 7.17 K/UL — SIGNIFICANT CHANGE UP (ref 4.8–10.8)
WBC # FLD AUTO: 7.17 K/UL — SIGNIFICANT CHANGE UP (ref 4.8–10.8)

## 2021-08-26 PROCEDURE — 99221 1ST HOSP IP/OBS SF/LOW 40: CPT

## 2021-08-26 PROCEDURE — 74018 RADEX ABDOMEN 1 VIEW: CPT | Mod: 26

## 2021-08-26 PROCEDURE — 95720 EEG PHY/QHP EA INCR W/VEEG: CPT

## 2021-08-26 PROCEDURE — 71045 X-RAY EXAM CHEST 1 VIEW: CPT | Mod: 26

## 2021-08-26 PROCEDURE — 99232 SBSQ HOSP IP/OBS MODERATE 35: CPT

## 2021-08-26 PROCEDURE — 70551 MRI BRAIN STEM W/O DYE: CPT | Mod: 26

## 2021-08-26 RX ORDER — THIAMINE MONONITRATE (VIT B1) 100 MG
500 TABLET ORAL EVERY 8 HOURS
Refills: 0 | Status: DISCONTINUED | OUTPATIENT
Start: 2021-08-26 | End: 2021-08-28

## 2021-08-26 RX ORDER — MIDAZOLAM HYDROCHLORIDE 1 MG/ML
0.1 INJECTION, SOLUTION INTRAMUSCULAR; INTRAVENOUS
Qty: 100 | Refills: 0 | Status: DISCONTINUED | OUTPATIENT
Start: 2021-08-26 | End: 2021-08-27

## 2021-08-26 RX ORDER — FENTANYL CITRATE 50 UG/ML
50 INJECTION INTRAVENOUS ONCE
Refills: 0 | Status: DISCONTINUED | OUTPATIENT
Start: 2021-08-26 | End: 2021-08-27

## 2021-08-26 RX ORDER — INSULIN LISPRO 100/ML
VIAL (ML) SUBCUTANEOUS EVERY 4 HOURS
Refills: 0 | Status: DISCONTINUED | OUTPATIENT
Start: 2021-08-26 | End: 2021-08-27

## 2021-08-26 RX ORDER — MIDAZOLAM HYDROCHLORIDE 1 MG/ML
4 INJECTION, SOLUTION INTRAMUSCULAR; INTRAVENOUS ONCE
Refills: 0 | Status: DISCONTINUED | OUTPATIENT
Start: 2021-08-26 | End: 2021-08-27

## 2021-08-26 RX ORDER — INSULIN HUMAN 100 [IU]/ML
1 INJECTION, SOLUTION SUBCUTANEOUS
Qty: 100 | Refills: 0 | Status: DISCONTINUED | OUTPATIENT
Start: 2021-08-26 | End: 2021-08-27

## 2021-08-26 RX ADMIN — CEFTRIAXONE 100 MILLIGRAM(S): 500 INJECTION, POWDER, FOR SOLUTION INTRAMUSCULAR; INTRAVENOUS at 05:56

## 2021-08-26 RX ADMIN — MIDAZOLAM HYDROCHLORIDE 44.1 MG/KG/HR: 1 INJECTION, SOLUTION INTRAMUSCULAR; INTRAVENOUS at 12:15

## 2021-08-26 RX ADMIN — CHLORHEXIDINE GLUCONATE 15 MILLILITER(S): 213 SOLUTION TOPICAL at 17:19

## 2021-08-26 RX ADMIN — MIDAZOLAM HYDROCHLORIDE 88.2 MG/KG/HR: 1 INJECTION, SOLUTION INTRAMUSCULAR; INTRAVENOUS at 08:00

## 2021-08-26 RX ADMIN — MIDAZOLAM HYDROCHLORIDE 88.2 MG/KG/HR: 1 INJECTION, SOLUTION INTRAMUSCULAR; INTRAVENOUS at 06:46

## 2021-08-26 RX ADMIN — LEVETIRACETAM 400 MILLIGRAM(S): 250 TABLET, FILM COATED ORAL at 06:24

## 2021-08-26 RX ADMIN — CHLORHEXIDINE GLUCONATE 15 MILLILITER(S): 213 SOLUTION TOPICAL at 05:55

## 2021-08-26 RX ADMIN — ATORVASTATIN CALCIUM 20 MILLIGRAM(S): 80 TABLET, FILM COATED ORAL at 22:57

## 2021-08-26 RX ADMIN — Medication 8: at 06:07

## 2021-08-26 RX ADMIN — MIDAZOLAM HYDROCHLORIDE 88.2 MG/KG/HR: 1 INJECTION, SOLUTION INTRAMUSCULAR; INTRAVENOUS at 00:39

## 2021-08-26 RX ADMIN — INSULIN GLARGINE 24 UNIT(S): 100 INJECTION, SOLUTION SUBCUTANEOUS at 08:00

## 2021-08-26 RX ADMIN — LEVETIRACETAM 400 MILLIGRAM(S): 250 TABLET, FILM COATED ORAL at 13:04

## 2021-08-26 RX ADMIN — MIDAZOLAM HYDROCHLORIDE 88.2 MG/KG/HR: 1 INJECTION, SOLUTION INTRAMUSCULAR; INTRAVENOUS at 05:28

## 2021-08-26 RX ADMIN — MIDAZOLAM HYDROCHLORIDE 88.2 MG/KG/HR: 1 INJECTION, SOLUTION INTRAMUSCULAR; INTRAVENOUS at 09:00

## 2021-08-26 RX ADMIN — MIDAZOLAM HYDROCHLORIDE 88.2 MG/KG/HR: 1 INJECTION, SOLUTION INTRAMUSCULAR; INTRAVENOUS at 07:00

## 2021-08-26 RX ADMIN — LEVETIRACETAM 400 MILLIGRAM(S): 250 TABLET, FILM COATED ORAL at 22:58

## 2021-08-26 RX ADMIN — MIDAZOLAM HYDROCHLORIDE 44.1 MG/KG/HR: 1 INJECTION, SOLUTION INTRAMUSCULAR; INTRAVENOUS at 13:00

## 2021-08-26 RX ADMIN — Medication 8.27 MICROGRAM(S)/KG/MIN: at 07:00

## 2021-08-26 RX ADMIN — Medication 81 MILLIGRAM(S): at 12:54

## 2021-08-26 RX ADMIN — POLYETHYLENE GLYCOL 3350 17 GRAM(S): 17 POWDER, FOR SOLUTION ORAL at 12:54

## 2021-08-26 RX ADMIN — Medication 6 UNIT(S): at 06:07

## 2021-08-26 RX ADMIN — PANTOPRAZOLE SODIUM 40 MILLIGRAM(S): 20 TABLET, DELAYED RELEASE ORAL at 12:54

## 2021-08-26 RX ADMIN — INSULIN HUMAN 1 UNIT(S)/HR: 100 INJECTION, SOLUTION SUBCUTANEOUS at 10:00

## 2021-08-26 RX ADMIN — MIDAZOLAM HYDROCHLORIDE 88.2 MG/KG/HR: 1 INJECTION, SOLUTION INTRAMUSCULAR; INTRAVENOUS at 01:50

## 2021-08-26 RX ADMIN — MIDAZOLAM HYDROCHLORIDE 88.2 MG/KG/HR: 1 INJECTION, SOLUTION INTRAMUSCULAR; INTRAVENOUS at 04:15

## 2021-08-26 RX ADMIN — SENNA PLUS 2 TABLET(S): 8.6 TABLET ORAL at 22:58

## 2021-08-26 RX ADMIN — CHLORHEXIDINE GLUCONATE 1 APPLICATION(S): 213 SOLUTION TOPICAL at 05:55

## 2021-08-26 RX ADMIN — ENOXAPARIN SODIUM 40 MILLIGRAM(S): 100 INJECTION SUBCUTANEOUS at 12:52

## 2021-08-26 RX ADMIN — FENTANYL CITRATE 8.82 MICROGRAM(S)/KG/HR: 50 INJECTION INTRAVENOUS at 07:00

## 2021-08-26 NOTE — PROGRESS NOTE ADULT - SUBJECTIVE AND OBJECTIVE BOX
Over Night Events: still intubated, ventilated, on versed 1 mg/kg/h, levophed 0.003, fentanyl,    PHYSICAL EXAM    ICU Vital Signs Last 24 Hrs  T(C): 36.9 (26 Aug 2021 06:00), Max: 36.9 (25 Aug 2021 20:00)  T(F): 98.4 (26 Aug 2021 06:00), Max: 98.4 (25 Aug 2021 20:00)  HR: 66 (26 Aug 2021 07:40) (58 - 80)  BP: 125/60 (26 Aug 2021 07:00) (86/50 - 147/81)  BP(mean): 88 (26 Aug 2021 07:00) (57 - 112)  RR: 17 (26 Aug 2021 07:00) (15 - 23)  SpO2: 100% (26 Aug 2021 07:40) (98% - 100%)      General: sedated  HEENT: ELIZABETH             Lymph Nodes: No cervical LN   Lungs: Bilateral BS  Cardiovascular: Regular   Abdomen: Soft, Positive BS  Extremities: No clubbing   Skin: Warm  Neurological: Non focal       08-25-21 @ 07:01  -  08-26-21 @ 07:00  --------------------------------------------------------  IN:    FentaNYL: 118.8 mL    Free Water: 500 mL    Glucerna: 1570 mL    Insulin: 44 mL    IV PiggyBack: 200 mL    Lactated Ringers: 350 mL    Midazolam (Status Epilepticus): 352.8 mL    Midazolam (Status Epilepticus): 1411.2 mL    Norepinephrine: 82.6 mL  Total IN: 4629.4 mL    OUT:    Indwelling Catheter - Urethral (mL): 1865 mL    Vasopressin: 0 mL  Total OUT: 1865 mL    Total NET: 2764.4 mL          LABS:                          11.2   7.17  )-----------( 106      ( 26 Aug 2021 04:30 )             34.6                                               08-26    132<L>  |  97<L>  |  13  ----------------------------<  343<H>  4.2   |  22  |  0.5<L>    Ca    7.4<L>      26 Aug 2021 04:30  Mg     2.1     08-25    TPro  4.5<L>  /  Alb  2.3<L>  /  TBili  4.0<H>  /  DBili  x   /  AST  242<H>  /  ALT  536<H>  /  AlkPhos  326<H>  08-26                                                 CARDIAC MARKERS ( 25 Aug 2021 04:30 )  x     / <0.01 ng/mL / x     / x     / x      CARDIAC MARKERS ( 24 Aug 2021 14:45 )  x     / x     / 108 U/L / x     / x                                                LIVER FUNCTIONS - ( 26 Aug 2021 04:30 )  Alb: 2.3 g/dL / Pro: 4.5 g/dL / ALK PHOS: 326 U/L / ALT: 536 U/L / AST: 242 U/L / GGT: x                                                  Culture - Sputum (collected 24 Aug 2021 14:30)  Source: .Sputum Deep tracheal intubation  Gram Stain (25 Aug 2021 06:58):    Numerous polymorphonuclear leukocytes per low power field    No Squamous epithelial cells per low power field    Numerous Gram Positive Cocci in Pairs and Chains seen per oil power field    Few Gram Negative Rods seen per oil power field  Preliminary Report (25 Aug 2021 18:32):    Normal Respiratory Chelsy present    Culture - Urine (collected 24 Aug 2021 06:11)  Source: Catheterized Catheterized  Final Report (25 Aug 2021 10:40):    No growth    Culture - Urine (collected 24 Aug 2021 02:47)  Source: Clean Catch Clean Catch (Midstream)  Final Report (25 Aug 2021 05:30):    No growth                                                   Mode: AC/ CMV (Assist Control/ Continuous Mandatory Ventilation)  RR (machine): 16  TV (machine): 420  FiO2: 35  PEEP: 5  ITime: 1  MAP: 8  PIP: 23                                      ABG - ( 26 Aug 2021 03:59 )  pH, Arterial: 7.40  pH, Blood: x     /  pCO2: 43    /  pO2: 76    / HCO3: 27    / Base Excess: 1.5   /  SaO2: 97.7                MEDICATIONS  (STANDING):  aspirin  chewable 81 milliGRAM(s) Oral daily  atorvastatin 20 milliGRAM(s) Oral at bedtime  cefTRIAXone   IVPB 1000 milliGRAM(s) IV Intermittent once  cefTRIAXone   IVPB 1000 milliGRAM(s) IV Intermittent every 24 hours  cefTRIAXone   IVPB      chlorhexidine 0.12% Liquid 15 milliLiter(s) Oral Mucosa every 12 hours  chlorhexidine 4% Liquid 1 Application(s) Topical <User Schedule>  enoxaparin Injectable 40 milliGRAM(s) SubCutaneous daily  fentaNYL   Infusion. 1 MICROgram(s)/kG/Hr (8.82 mL/Hr) IV Continuous <Continuous>  levETIRAcetam  IVPB 1000 milliGRAM(s) IV Intermittent <User Schedule>  midazolam Infusion. 1 mG/kG/Hr (88.2 mL/Hr) IV Continuous <Continuous>  midazolam Injectable 4 milliGRAM(s) IV Push once  norepinephrine Infusion 0.05 MICROgram(s)/kG/Min (8.27 mL/Hr) IV Continuous <Continuous>  pantoprazole   Suspension 40 milliGRAM(s) Oral daily  polyethylene glycol 3350 17 Gram(s) Oral daily  senna 2 Tablet(s) Oral at bedtime  vasopressin Infusion 0.1 Unit(s)/Min (6 mL/Hr) IV Continuous <Continuous>    MEDICATIONS  (PRN):  acetaminophen   Tablet .. 650 milliGRAM(s) Oral every 6 hours PRN Temp greater or equal to 38C (100.4F)      CXR reviewed

## 2021-08-26 NOTE — PROGRESS NOTE ADULT - ASSESSMENT
IMPRESSION:    [facility] keep insulin drip  status epilepticus sp intubation for airway protection  cannot r/o stroke/ MRI P  renal failure resolved  doubt meningoencephalitis    PLAN:    CNS: versed VEEG, Neurology f/up ,MRI of brain to r/o stroke, keppra     HEENT: Oral care, HOB at 45,     PULMONARY: head of bed elevation, no changes in vent settings    CARDIOVASCULAR: echo reviewed, taper pressors    GI: GI prophylaxis,  feeding    RENAL: I=0, monitor electrolytes and replete as needed, dc free water    INFECTIOUS DISEASE: pancx, abx per ID, procal    HEMATOLOGICAL:  DVT prophylaxis    ENDOCRINE:  insulin infusion for hhs      Lubin  Arterial line  Peripheral   Code status= full code    poor prognosis  r ij 8/24  lubin 8/24

## 2021-08-26 NOTE — PROGRESS NOTE ADULT - ASSESSMENT
ASSESSMENT:    On the initial evaluation by family, patient was noted to have right-sided weakness with clonus activity subsequently.  EMS noted patient's mental status to deteriorate en route.   On ED evaluation patient ANOx0, eyes opening spontaneously, not following commands.  Unable to obtain proper neurological exam. Stroke code activated.  Patient taken to CT.  In CT patient noted to have a tonic-clonic seizure episode.  Ativan 2 mg given and brought back to the critical care area.  Patient subsequently noted to be agitated, restrained given additional Ativan.  Initial CT reading negative for acute intracranial hemorrhage, infarct, or mass. With neurology provider at bedside, the case conveyed to tele-stroke attending; pt was not a TPA candidate. Pt was intubated for airway protection.   Initial vitals hypertensive, tachycardic, afebrile rectally, fingerstick >600.    LOS: 2d.    Intubated on 8/24  TLC: yes, 8/24  No A-line    #HHS   - Glucose improved, yesterday, insulin drip bridged with subq, then pt hyperglycemic overnight to 300s despite sliding scale (306 s/p Lispo 14 units on standing Lantus 24 units) -> re-started insulin drip on 8/26 with goal -180  - Continue to monitor FS, keep - F/u A1c    #Status epilepticus (s/p intubation for airway protection)  - Patient still on vEEG, neuro critical care team following  - No notable seizure activity seen on vEEG  - Keppra increased to 1g q8, on Versed 1 mg/kg as per ??? down to 0.5 as per Dr. Schwab on 8/26  - MRI brain w/o contrast pending; called MRI tech, pt on many meds, tech needs to speak with nurses about their availability to switch the meds onto the MRI-compatible devices  - Continuing coma/pupil checks  - LP?   - Will wean off sedation as tolerated (h/o substance abuse as per wife at bedside), f/u tox screens    #Encephalopathy  - F/u Ucx, sputum cx  - F/u arterial Bcx (sent 8/26)  - F/u urine toxicology    #Cannot r/o stroke - f/u MRI  #TYLER - improving    DVT ppx - Lovenox  GI ppx  Diet - Tube feeds  Code - Full  Dispo - Pending

## 2021-08-26 NOTE — PROGRESS NOTE ADULT - SUBJECTIVE AND OBJECTIVE BOX
NEUROCRITICAL CARE CONSULT    70 yo diabetic, admitted with HHS/DKA s/p clinical tonic-clonic seizure x2 qualifying for status epilepticus on keppra 3g/d and versed gtt, course c/b respiratory failure, distributive shock, encephalopathy, metabolic derangements  NCCU consulted for continued status epilepticus management and given resolving metabolic abnormalities will proceed to assume care as primary team on 8/27.     exam:  neuro- sedated on versed gtt of 1mg/kg/hr, pupils 2 equal sluggish, midline gaze, no gross facial, good motor tone on all extremities, withdraws on all four; +cough/gag/corneal/doll's/respiratory drive    -pupil/coma checks  -MR brain  -weaned versed gtt to 0.5, will monitor on vEEG  -continue keppra 1g q8; if pt requires additional AED, would consider vimpat

## 2021-08-26 NOTE — PROGRESS NOTE ADULT - SUBJECTIVE AND OBJECTIVE BOX
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 No Known Allergies  Pt came in for acute onset confusion, 1.5 hours prior to arrival.   On the initial evaluation by family, patient was noted to have right-sided weakness with clonus activity subsequently.  EMS noted patient's mental status to deteriorate en route.   On ED evaluation patient ANOx0, eyes opening spontaneously, not following commands.  Unable to obtain proper neurological exam. Stroke code activated.  Patient taken to CT.  In CT patient noted to have a tonic-clonic seizure episode.  Ativan 2 mg given and brought back to the critical care area.  Patient subsequently noted to be agitated, restrained given additional Ativan.  Initial CT reading negative for acute intracranial hemorrhage, infarct, or mass. With neurology provider at bedside, the case conveyed to tele-stroke attending; pt was not a TPA candidate. Pt was intubated for airway protection.   Initial vitals hypertensive, tachycardic, afebrile rectally, fingerstick >600.  Admitted for stroke versus metabolic encephalopathy, and HHS.     Hospital Stay: 2d.    SUBJECTIVE:  Overnight events: noted    REVIEW OF SYSTEMS: Unable to obtain    OBJECTIVE:  VITALS:  T(F): 97.6 (08-26-21 @ 08:00), Max: 98.4 (08-25-21 @ 20:00)  HR: 60 (08-26-21 @ 10:00) (58 - 80)  BP: 131/60 (08-26-21 @ 10:00) (91/47 - 140/66)  ABP: --  ABP(mean): --  RR: 16 (08-26-21 @ 10:00) (9 - 23)  SpO2: 100% (08-26-21 @ 10:00) (98% - 100%)    Vent Settings  Device: Avea, Mode: AC/ CMV (Assist Control/ Continuous Mandatory Ventilation), RR (machine): 16, TV (machine): 420, FiO2: 35, PEEP: 5, ITime: 1, MAP: 8, PIP: 23    Blood Gas  08-26-21 @ 03:59  pH 7.40 | pCO2 43 | pO2 76 | HCO3 27 | O2 Sat 97.7      Drips  insulin regular Infusion IV Continuous <Continuous>  norepinephrine Infusion 0.02 IV Continuous <Continuous>  midazolam Infusion. 1 mG/kG/Hr (88.2 mL/Hr) IV Continuous <Continuous>    I&Os:    08-25-21 @ 07:01  -  08-26-21 @ 07:00  --------------------------------------------------------  IN: 4629.4 mL / OUT: 1865 mL / NET: 2764.4 mL    08-26-21 @ 07:01  -  08-26-21 @ 12:08  --------------------------------------------------------  IN: 493.6 mL / OUT: 300 mL / NET: 193.6 mL    PHYSICAL EXAM:  GEN: Sedated, NAD  SKIN: Warm, dry  HEAD: NCAT  EYES: PER B/L, no conjunctival pallor  ENT: Patent airway  NECK: Midline trachea  CARDIO: RRR, sinus; no JVD  CHEST: B/L chest rise and fall, CTAB  ABD: Soft, NT, ND, BS+  NEURO:Sedated    ACTIVE MEDICATIONS:  aspirin  chewable 81 milliGRAM(s) Oral daily  atorvastatin 20 milliGRAM(s) Oral at bedtime  cefTRIAXone   IVPB 1000 milliGRAM(s) IV Intermittent once  cefTRIAXone   IVPB 1000 milliGRAM(s) IV Intermittent every 24 hours  cefTRIAXone   IVPB      chlorhexidine 0.12% Liquid 15 milliLiter(s) Oral Mucosa every 12 hours  chlorhexidine 4% Liquid 1 Application(s) Topical <User Schedule>  enoxaparin Injectable 40 milliGRAM(s) SubCutaneous daily  levETIRAcetam  IVPB 1000 milliGRAM(s) IV Intermittent <User Schedule>  midazolam Injectable 4 milliGRAM(s) IV Push once  pantoprazole   Suspension 40 milliGRAM(s) Oral daily  polyethylene glycol 3350 17 Gram(s) Oral daily  senna 2 Tablet(s) Oral at bedtime    RESULTS:  08-26-21 @ 11:10  Na 143, K 4.5, Cl 109, CO2 26, BUN 16, Cr 1.1, Glu 334<H>    Ca 8.5  TP 4.7<L>, Alb 2.9<L>, T. Bili 0.5, D. Bili --  AST 15, ALT 16, Alk phos 98    08-26-21 @ 04:30  WBC 7.17, H/H 11.2<L>/34.6<L>, plt 106<L>  Na 132<L>, K 4.2, Cl 97<L>, CO2 22, BUN 13, Cr 0.5<L>, Glu 343<H>    Ca 7.4<L>  TP 4.5<L>, Alb 2.3<L>, T. Bili 4.0<H>, D. Bili --  <H>, <H>, Alk phos 326<H>    Culture - Sputum (collected 08-24-21 @ 14:30)  Source: .Sputum Deep tracheal intubation  Gram Stain (08-25-21 @ 06:58):    Numerous polymorphonuclear leukocytes per low power field    No Squamous epithelial cells per low power field    Numerous Gram Positive Cocci in Pairs and Chains seen per oil power field    Few Gram Negative Rods seen per oil power field  Preliminary Report (08-25-21 @ 18:32):    Normal Respiratory Chelsy present    Culture - Urine (collected 08-24-21 @ 06:11)  Source: Catheterized Catheterized  Final Report (08-25-21 @ 10:40):    No growth    Culture - Urine (collected 08-24-21 @ 02:47)  Source: Clean Catch Clean Catch (Midstream)  Final Report (08-25-21 @ 05:30):    No growth      PENDING RESULTS:  Culture - Blood: STAT  Specimen Source: Blood-Arterial (08-26-21 @ 11:39)  Osmolality, Serum: 11:00 (08-26-21 @ 09:19)  MRSA/MSSA PCR: Routine (08-26-21 @ 05:57)  Procalcitonin, Serum: AM Sched. Collection: 26-Aug-2021 04:30 (08-25-21 @ 20:53)  A1C with Estimated Average Glucose: AM Sched. Collection: 26-Aug-2021 04:30 (08-25-21 @ 14:58)    IMAGING:  Latest imaging reviewed.    EXAM:  XR CHEST PORTABLE ROUTINE 1V        PROCEDURE DATE:  08/26/2021    Impression:  Low lung volumes without consolidation, effusion or pneumothorax.  --- End of Report ---    EXAM:  DUPLEX SCAN EXT VEINS LOWER BI        PROCEDURE DATE:  08/24/2021    Impression:  No evidence of deep venous thrombosis or superficial thrombophlebitis in the bilateral lower extremities.  --- End of Report ---    EXAM:  CT ANGIO BRAIN (W)AW IC        EXAM:  CT ANGIO NECK (W)AW IC        EXAM:  CT PERFUSION W MAPS IC        PROCEDURE DATE:  08/24/2021    IMPRESSION:  PERFUSION: Nondiagnostic perfusion exam secondary to a software error as detailed above.  CTA HEAD: No evidence of flow-limiting stenosis, occlusion or aneurysm.  CTA NECK: No evidence of flow-limiting stenosis, occlusion or aneurysm..  --- End of Report ---    EXAM:  CT BRAIN STROKE PROTOCOL       PROCEDURE DATE:  08/24/2021    IMPRESSION:  No CT evidence for acute intracranial pathology.  --- End of Report ---    ECHO:  EXAM:  ECHO TTE WO CON COMP W DOPP    *** ADDENDUM 08/24/2021  ***  Date of Exam:        8/24/2021 1:10:34 PM  Summary:   1. Technically suboptimal study.   2. Normal global left ventricular systolic function.   3. Left ventricular ejection fraction, by visual estimation, is 55 to 60%.   4. Normal right ventricular size and function.   5. LA volume Index is 19.0 ml/m² ml/m2.   6. Normal leftventricular internal cavity size.   7. The left ventricular diastolic function could not be assessed in this study.    PHYSICIAN INTERPRETATION:  Left Ventricle: The left ventricular internal cavity size is normal. Left ventricular wall thickness is normal. There is no left ventricular hypertrophy. Global LV systolic function was normal. Left ventricular ejection fraction, by visual estimation, is 55 to 60%. The left ventricular diastolic function could not be assessed in this study.      LV WallScoring:  All segments are normal.    *** Final (Updated) ***  *** END OF WUOHPLOS74/24/2021  ***  *** Final ***    EEG  vEEG, Report 8/25 @10:45   in the last 24 hours: intubated and sedated  Background------------  very low amplitude reaching frequencies in the range of 304 hz and showing minimal reactivity. The recording also shows bursts of diffusely expressed sharply contoured theta with shifting asymmetry admixed with sharp transients and perhaps a preceding    low amplitude spike  Focal and generalized slowing------moderate to severe generalized slwoin  Interictal activity------------- as above  Events-none  Seizures----none  Impression:  abnormal as above   Plan -   discussed  with the team    Amando, Report 8/26 @10:43  Background-----------continues, slightly higher amplitude and mor reactive reaching frequencies in the range of 5-6 hz   Focal and generalized slowing-----moderate generalized slowing  Interictal activity----------- rare bifrontal-FP sharp transients  Events-----------  none  Seizures--------  none  Impression:   abnormal as above  Plan - as/NCC team

## 2021-08-26 NOTE — EEG REPORT - NS EEG TEXT BOX
Epilepsy Attending Note:     JOSÉ MANUEL PORTER    71y Male  MRN [id number]    Vital Signs Last 24 Hrs  T(C): 36.4 (26 Aug 2021 08:00), Max: 36.9 (25 Aug 2021 20:00)  T(F): 97.6 (26 Aug 2021 08:00), Max: 98.4 (25 Aug 2021 20:00)  HR: 60 (26 Aug 2021 10:00) (58 - 80)  BP: 131/60 (26 Aug 2021 10:00) (86/50 - 147/81)  BP(mean): 90 (26 Aug 2021 10:00) (57 - 112)  RR: 16 (26 Aug 2021 10:00) (9 - 23)  SpO2: 100% (26 Aug 2021 10:00) (98% - 100%)                          11.2   7.17  )-----------( 106      ( 26 Aug 2021 04:30 )             34.6       08-26    132<L>  |  97<L>  |  13  ----------------------------<  343<H>  4.2   |  22  |  0.5<L>    Ca    7.4<L>      26 Aug 2021 04:30  Mg     2.1     08-25    TPro  4.5<L>  /  Alb  2.3<L>  /  TBili  4.0<H>  /  DBili  x   /  AST  242<H>  /  ALT  536<H>  /  AlkPhos  326<H>  08-26      MEDICATIONS  (STANDING):  aspirin  chewable 81 milliGRAM(s) Oral daily  atorvastatin 20 milliGRAM(s) Oral at bedtime  cefTRIAXone   IVPB 1000 milliGRAM(s) IV Intermittent once  cefTRIAXone   IVPB 1000 milliGRAM(s) IV Intermittent every 24 hours  cefTRIAXone   IVPB      chlorhexidine 0.12% Liquid 15 milliLiter(s) Oral Mucosa every 12 hours  chlorhexidine 4% Liquid 1 Application(s) Topical <User Schedule>  enoxaparin Injectable 40 milliGRAM(s) SubCutaneous daily  insulin regular Infusion 1 Unit(s)/Hr (1 mL/Hr) IV Continuous <Continuous>  levETIRAcetam  IVPB 1000 milliGRAM(s) IV Intermittent <User Schedule>  midazolam Infusion. 1 mG/kG/Hr (88.2 mL/Hr) IV Continuous <Continuous>  midazolam Injectable 4 milliGRAM(s) IV Push once  norepinephrine Infusion 0.05 MICROgram(s)/kG/Min (8.27 mL/Hr) IV Continuous <Continuous>  pantoprazole   Suspension 40 milliGRAM(s) Oral daily  polyethylene glycol 3350 17 Gram(s) Oral daily  senna 2 Tablet(s) Oral at bedtime  vasopressin Infusion 0.1 Unit(s)/Min (6 mL/Hr) IV Continuous <Continuous>    MEDICATIONS  (PRN):            VEEG in the last 24 hours:    Background-----------continues, slightly higher amplitude and mor reactive reaching frequencies in the range of 5-6 hz     Focal and generalized slowing-----moderate generalized slowing    Interictal activity----------- rare bifrontal-FP sharp transients    Events-----------  none    Seizures--------  none    Impression:   abnormal as above    Plan - as/NCC team

## 2021-08-27 LAB
A1C WITH ESTIMATED AVERAGE GLUCOSE RESULT: >15.5 % — HIGH (ref 4–5.6)
ALBUMIN SERPL ELPH-MCNC: 2.9 G/DL — LOW (ref 3.5–5.2)
ALP SERPL-CCNC: 118 U/L — HIGH (ref 30–115)
ALT FLD-CCNC: 22 U/L — SIGNIFICANT CHANGE UP (ref 0–41)
AMMONIA BLD-MCNC: 23 UMOL/L — SIGNIFICANT CHANGE UP (ref 11–55)
ANION GAP SERPL CALC-SCNC: 9 MMOL/L — SIGNIFICANT CHANGE UP (ref 7–14)
AST SERPL-CCNC: 26 U/L — SIGNIFICANT CHANGE UP (ref 0–41)
BASE EXCESS BLDA CALC-SCNC: -0.6 MMOL/L — SIGNIFICANT CHANGE UP (ref -2–3)
BASOPHILS # BLD AUTO: 0.02 K/UL — SIGNIFICANT CHANGE UP (ref 0–0.2)
BASOPHILS NFR BLD AUTO: 0.3 % — SIGNIFICANT CHANGE UP (ref 0–1)
BILIRUB SERPL-MCNC: 0.3 MG/DL — SIGNIFICANT CHANGE UP (ref 0.2–1.2)
BUN SERPL-MCNC: 17 MG/DL — SIGNIFICANT CHANGE UP (ref 10–20)
CALCIUM SERPL-MCNC: 8.8 MG/DL — SIGNIFICANT CHANGE UP (ref 8.5–10.1)
CHLORIDE SERPL-SCNC: 107 MMOL/L — SIGNIFICANT CHANGE UP (ref 98–110)
CO2 SERPL-SCNC: 25 MMOL/L — SIGNIFICANT CHANGE UP (ref 17–32)
CREAT SERPL-MCNC: 1.1 MG/DL — SIGNIFICANT CHANGE UP (ref 0.7–1.5)
CULTURE RESULTS: SIGNIFICANT CHANGE UP
EOSINOPHIL # BLD AUTO: 0.02 K/UL — SIGNIFICANT CHANGE UP (ref 0–0.7)
EOSINOPHIL NFR BLD AUTO: 0.3 % — SIGNIFICANT CHANGE UP (ref 0–8)
ESTIMATED AVERAGE GLUCOSE: >398 MG/DL — HIGH (ref 68–114)
GLUCOSE BLDC GLUCOMTR-MCNC: 107 MG/DL — HIGH (ref 70–99)
GLUCOSE BLDC GLUCOMTR-MCNC: 132 MG/DL — HIGH (ref 70–99)
GLUCOSE BLDC GLUCOMTR-MCNC: 152 MG/DL — HIGH (ref 70–99)
GLUCOSE BLDC GLUCOMTR-MCNC: 160 MG/DL — HIGH (ref 70–99)
GLUCOSE BLDC GLUCOMTR-MCNC: 225 MG/DL — HIGH (ref 70–99)
GLUCOSE BLDC GLUCOMTR-MCNC: 246 MG/DL — HIGH (ref 70–99)
GLUCOSE BLDC GLUCOMTR-MCNC: 264 MG/DL — HIGH (ref 70–99)
GLUCOSE BLDC GLUCOMTR-MCNC: 273 MG/DL — HIGH (ref 70–99)
GLUCOSE BLDC GLUCOMTR-MCNC: 280 MG/DL — HIGH (ref 70–99)
GLUCOSE BLDC GLUCOMTR-MCNC: 284 MG/DL — HIGH (ref 70–99)
GLUCOSE BLDC GLUCOMTR-MCNC: 300 MG/DL — HIGH (ref 70–99)
GLUCOSE BLDC GLUCOMTR-MCNC: 308 MG/DL — HIGH (ref 70–99)
GLUCOSE BLDC GLUCOMTR-MCNC: 322 MG/DL — HIGH (ref 70–99)
GLUCOSE BLDC GLUCOMTR-MCNC: 324 MG/DL — HIGH (ref 70–99)
GLUCOSE BLDC GLUCOMTR-MCNC: 344 MG/DL — HIGH (ref 70–99)
GLUCOSE SERPL-MCNC: 352 MG/DL — HIGH (ref 70–99)
HCO3 BLDA-SCNC: 25 MMOL/L — SIGNIFICANT CHANGE UP (ref 21–28)
HCT VFR BLD CALC: 33.1 % — LOW (ref 42–52)
HGB BLD-MCNC: 10.7 G/DL — LOW (ref 14–18)
HOROWITZ INDEX BLDA+IHG-RTO: 35 — SIGNIFICANT CHANGE UP
IMM GRANULOCYTES NFR BLD AUTO: 1.6 % — HIGH (ref 0.1–0.3)
LYMPHOCYTES # BLD AUTO: 0.37 K/UL — LOW (ref 1.2–3.4)
LYMPHOCYTES # BLD AUTO: 4.6 % — LOW (ref 20.5–51.1)
MAGNESIUM SERPL-MCNC: 2.1 MG/DL — SIGNIFICANT CHANGE UP (ref 1.8–2.4)
MCHC RBC-ENTMCNC: 29.3 PG — SIGNIFICANT CHANGE UP (ref 27–31)
MCHC RBC-ENTMCNC: 32.3 G/DL — SIGNIFICANT CHANGE UP (ref 32–37)
MCV RBC AUTO: 90.7 FL — SIGNIFICANT CHANGE UP (ref 80–94)
MONOCYTES # BLD AUTO: 0.42 K/UL — SIGNIFICANT CHANGE UP (ref 0.1–0.6)
MONOCYTES NFR BLD AUTO: 5.3 % — SIGNIFICANT CHANGE UP (ref 1.7–9.3)
NEUTROPHILS # BLD AUTO: 7.01 K/UL — HIGH (ref 1.4–6.5)
NEUTROPHILS NFR BLD AUTO: 87.9 % — HIGH (ref 42.2–75.2)
NRBC # BLD: 0 /100 WBCS — SIGNIFICANT CHANGE UP (ref 0–0)
PCO2 BLDA: 43 MMHG — SIGNIFICANT CHANGE UP (ref 35–48)
PH BLDA: 7.37 — SIGNIFICANT CHANGE UP (ref 7.35–7.45)
PLATELET # BLD AUTO: 123 K/UL — LOW (ref 130–400)
PO2 BLDA: 94 MMHG — SIGNIFICANT CHANGE UP (ref 83–108)
POTASSIUM SERPL-MCNC: 5.1 MMOL/L — HIGH (ref 3.5–5)
POTASSIUM SERPL-SCNC: 5.1 MMOL/L — HIGH (ref 3.5–5)
PROCALCITONIN SERPL-MCNC: 0.11 NG/ML — HIGH (ref 0.02–0.1)
PROCALCITONIN SERPL-MCNC: 19 NG/ML — HIGH (ref 0.02–0.1)
PROT SERPL-MCNC: 4.8 G/DL — LOW (ref 6–8)
RBC # BLD: 3.65 M/UL — LOW (ref 4.7–6.1)
RBC # FLD: 13.7 % — SIGNIFICANT CHANGE UP (ref 11.5–14.5)
SAO2 % BLDA: 98.5 % — HIGH (ref 94–98)
SODIUM SERPL-SCNC: 141 MMOL/L — SIGNIFICANT CHANGE UP (ref 135–146)
SPECIMEN SOURCE: SIGNIFICANT CHANGE UP
WBC # BLD: 7.97 K/UL — SIGNIFICANT CHANGE UP (ref 4.8–10.8)
WBC # FLD AUTO: 7.97 K/UL — SIGNIFICANT CHANGE UP (ref 4.8–10.8)

## 2021-08-27 PROCEDURE — 71045 X-RAY EXAM CHEST 1 VIEW: CPT | Mod: 26

## 2021-08-27 PROCEDURE — 99291 CRITICAL CARE FIRST HOUR: CPT | Mod: 25

## 2021-08-27 PROCEDURE — 95720 EEG PHY/QHP EA INCR W/VEEG: CPT

## 2021-08-27 PROCEDURE — 36620 INSERTION CATHETER ARTERY: CPT

## 2021-08-27 RX ORDER — INSULIN HUMAN 100 [IU]/ML
6 INJECTION, SOLUTION SUBCUTANEOUS ONCE
Refills: 0 | Status: COMPLETED | OUTPATIENT
Start: 2021-08-27 | End: 2021-08-27

## 2021-08-27 RX ORDER — DEXMEDETOMIDINE HYDROCHLORIDE IN 0.9% SODIUM CHLORIDE 4 UG/ML
0.2 INJECTION INTRAVENOUS
Qty: 200 | Refills: 0 | Status: DISCONTINUED | OUTPATIENT
Start: 2021-08-27 | End: 2021-08-28

## 2021-08-27 RX ORDER — ACETYLCYSTEINE 200 MG/ML
3 VIAL (ML) MISCELLANEOUS THREE TIMES A DAY
Refills: 0 | Status: COMPLETED | OUTPATIENT
Start: 2021-08-27 | End: 2021-08-28

## 2021-08-27 RX ORDER — SODIUM CHLORIDE 9 MG/ML
1000 INJECTION, SOLUTION INTRAVENOUS
Refills: 0 | Status: DISCONTINUED | OUTPATIENT
Start: 2021-08-27 | End: 2021-08-30

## 2021-08-27 RX ORDER — DEXMEDETOMIDINE HYDROCHLORIDE IN 0.9% SODIUM CHLORIDE 4 UG/ML
0.2 INJECTION INTRAVENOUS
Qty: 200 | Refills: 0 | Status: DISCONTINUED | OUTPATIENT
Start: 2021-08-27 | End: 2021-08-27

## 2021-08-27 RX ORDER — ALBUTEROL 90 UG/1
2 AEROSOL, METERED ORAL EVERY 6 HOURS
Refills: 0 | Status: DISCONTINUED | OUTPATIENT
Start: 2021-08-27 | End: 2021-08-31

## 2021-08-27 RX ORDER — HUMAN INSULIN 100 [IU]/ML
2 INJECTION, SUSPENSION SUBCUTANEOUS ONCE
Refills: 0 | Status: DISCONTINUED | OUTPATIENT
Start: 2021-08-27 | End: 2021-08-27

## 2021-08-27 RX ORDER — GLUCAGON INJECTION, SOLUTION 0.5 MG/.1ML
1 INJECTION, SOLUTION SUBCUTANEOUS ONCE
Refills: 0 | Status: DISCONTINUED | OUTPATIENT
Start: 2021-08-27 | End: 2021-08-29

## 2021-08-27 RX ORDER — FENTANYL CITRATE 50 UG/ML
25 INJECTION INTRAVENOUS ONCE
Refills: 0 | Status: DISCONTINUED | OUTPATIENT
Start: 2021-08-27 | End: 2021-08-27

## 2021-08-27 RX ORDER — HUMAN INSULIN 100 [IU]/ML
15 INJECTION, SUSPENSION SUBCUTANEOUS EVERY 6 HOURS
Refills: 0 | Status: DISCONTINUED | OUTPATIENT
Start: 2021-08-27 | End: 2021-08-28

## 2021-08-27 RX ORDER — ACETYLCYSTEINE 200 MG/ML
4 VIAL (ML) MISCELLANEOUS EVERY 6 HOURS
Refills: 0 | Status: DISCONTINUED | OUTPATIENT
Start: 2021-08-27 | End: 2021-08-27

## 2021-08-27 RX ORDER — MULTIVIT WITH MIN/MFOLATE/K2 340-15/3 G
1 POWDER (GRAM) ORAL ONCE
Refills: 0 | Status: COMPLETED | OUTPATIENT
Start: 2021-08-27 | End: 2021-08-27

## 2021-08-27 RX ORDER — LACOSAMIDE 50 MG/1
200 TABLET ORAL ONCE
Refills: 0 | Status: DISCONTINUED | OUTPATIENT
Start: 2021-08-27 | End: 2021-08-27

## 2021-08-27 RX ORDER — INSULIN LISPRO 100/ML
VIAL (ML) SUBCUTANEOUS
Refills: 0 | Status: DISCONTINUED | OUTPATIENT
Start: 2021-08-27 | End: 2021-08-27

## 2021-08-27 RX ORDER — LACOSAMIDE 50 MG/1
100 TABLET ORAL EVERY 12 HOURS
Refills: 0 | Status: DISCONTINUED | OUTPATIENT
Start: 2021-08-27 | End: 2021-08-28

## 2021-08-27 RX ORDER — HUMAN INSULIN 100 [IU]/ML
15 INJECTION, SUSPENSION SUBCUTANEOUS EVERY 6 HOURS
Refills: 0 | Status: DISCONTINUED | OUTPATIENT
Start: 2021-08-27 | End: 2021-08-27

## 2021-08-27 RX ORDER — MIDAZOLAM HYDROCHLORIDE 1 MG/ML
0.2 INJECTION, SOLUTION INTRAMUSCULAR; INTRAVENOUS
Qty: 100 | Refills: 0 | Status: DISCONTINUED | OUTPATIENT
Start: 2021-08-27 | End: 2021-08-27

## 2021-08-27 RX ORDER — INSULIN LISPRO 100/ML
VIAL (ML) SUBCUTANEOUS EVERY 6 HOURS
Refills: 0 | Status: DISCONTINUED | OUTPATIENT
Start: 2021-08-27 | End: 2021-08-28

## 2021-08-27 RX ADMIN — PANTOPRAZOLE SODIUM 40 MILLIGRAM(S): 20 TABLET, DELAYED RELEASE ORAL at 12:07

## 2021-08-27 RX ADMIN — MIDAZOLAM HYDROCHLORIDE 17.6 MG/KG/HR: 1 INJECTION, SOLUTION INTRAMUSCULAR; INTRAVENOUS at 05:33

## 2021-08-27 RX ADMIN — INSULIN HUMAN 1 UNIT(S)/HR: 100 INJECTION, SOLUTION SUBCUTANEOUS at 05:05

## 2021-08-27 RX ADMIN — Medication 4: at 00:00

## 2021-08-27 RX ADMIN — FENTANYL CITRATE 50 MICROGRAM(S): 50 INJECTION INTRAVENOUS at 15:31

## 2021-08-27 RX ADMIN — Medication 105 MILLIGRAM(S): at 14:46

## 2021-08-27 RX ADMIN — HUMAN INSULIN 15 UNIT(S): 100 INJECTION, SUSPENSION SUBCUTANEOUS at 15:01

## 2021-08-27 RX ADMIN — LEVETIRACETAM 400 MILLIGRAM(S): 250 TABLET, FILM COATED ORAL at 14:24

## 2021-08-27 RX ADMIN — LEVETIRACETAM 400 MILLIGRAM(S): 250 TABLET, FILM COATED ORAL at 21:00

## 2021-08-27 RX ADMIN — ATORVASTATIN CALCIUM 20 MILLIGRAM(S): 80 TABLET, FILM COATED ORAL at 21:00

## 2021-08-27 RX ADMIN — MIDAZOLAM HYDROCHLORIDE 8.82 MG/KG/HR: 1 INJECTION, SOLUTION INTRAMUSCULAR; INTRAVENOUS at 00:33

## 2021-08-27 RX ADMIN — Medication 8: at 03:54

## 2021-08-27 RX ADMIN — FENTANYL CITRATE 25 MICROGRAM(S): 50 INJECTION INTRAVENOUS at 20:59

## 2021-08-27 RX ADMIN — ALBUTEROL 2 PUFF(S): 90 AEROSOL, METERED ORAL at 21:20

## 2021-08-27 RX ADMIN — FENTANYL CITRATE 25 MICROGRAM(S): 50 INJECTION INTRAVENOUS at 21:30

## 2021-08-27 RX ADMIN — INSULIN HUMAN 6 UNIT(S): 100 INJECTION, SOLUTION SUBCUTANEOUS at 02:39

## 2021-08-27 RX ADMIN — Medication 3 MILLILITER(S): at 15:01

## 2021-08-27 RX ADMIN — Medication 10 MILLIGRAM(S): at 07:11

## 2021-08-27 RX ADMIN — POLYETHYLENE GLYCOL 3350 17 GRAM(S): 17 POWDER, FOR SOLUTION ORAL at 12:09

## 2021-08-27 RX ADMIN — Medication 105 MILLIGRAM(S): at 06:34

## 2021-08-27 RX ADMIN — Medication 1 BOTTLE: at 15:01

## 2021-08-27 RX ADMIN — Medication 105 MILLIGRAM(S): at 00:01

## 2021-08-27 RX ADMIN — ENOXAPARIN SODIUM 40 MILLIGRAM(S): 100 INJECTION SUBCUTANEOUS at 12:07

## 2021-08-27 RX ADMIN — ALBUTEROL 2 PUFF(S): 90 AEROSOL, METERED ORAL at 15:01

## 2021-08-27 RX ADMIN — Medication 81 MILLIGRAM(S): at 12:05

## 2021-08-27 RX ADMIN — CHLORHEXIDINE GLUCONATE 15 MILLILITER(S): 213 SOLUTION TOPICAL at 05:29

## 2021-08-27 RX ADMIN — SENNA PLUS 2 TABLET(S): 8.6 TABLET ORAL at 21:00

## 2021-08-27 RX ADMIN — Medication 2 MILLIGRAM(S): at 22:12

## 2021-08-27 RX ADMIN — CHLORHEXIDINE GLUCONATE 15 MILLILITER(S): 213 SOLUTION TOPICAL at 18:31

## 2021-08-27 RX ADMIN — Medication 8: at 02:40

## 2021-08-27 RX ADMIN — Medication 105 MILLIGRAM(S): at 21:00

## 2021-08-27 RX ADMIN — LEVETIRACETAM 400 MILLIGRAM(S): 250 TABLET, FILM COATED ORAL at 05:33

## 2021-08-27 RX ADMIN — CHLORHEXIDINE GLUCONATE 1 APPLICATION(S): 213 SOLUTION TOPICAL at 05:29

## 2021-08-27 RX ADMIN — HUMAN INSULIN 15 UNIT(S): 100 INJECTION, SUSPENSION SUBCUTANEOUS at 21:36

## 2021-08-27 NOTE — OCCUPATIONAL THERAPY INITIAL EVALUATION ADULT - SPLINT FABRICATED DETAIL, OT EVAL
Pt provided bue resting hand splints and ble heel protection and postioning splints to maintain rom and decrease risk of contracture. RN Jose educated re wearing schedule of as tolerated.

## 2021-08-27 NOTE — PROCEDURE NOTE - NSINDICATIONS_GEN_A_CORE
airway protection
arterial puncture to obtain ABG's/critical patient/monitoring purposes
critical illness/emergency venous access/hypertonic/irritant infusion/venous access/volume resuscitation

## 2021-08-27 NOTE — PROGRESS NOTE ADULT - SUBJECTIVE AND OBJECTIVE BOX
pt seen this morning in the ICU.  pt remains intubated, sedated, on video EEG  right IJ TLC c/d/i  insulin drip at 14 u/h when seen, feeds reduced to 50 ml/h  off pressors  abd soft, ND, large  no LE or sacral edema  Vital Signs Last 24 Hrs  T(C): 36.3 (27 Aug 2021 13:00), Max: 37.7 (27 Aug 2021 00:00)  T(F): 97.3 (27 Aug 2021 13:00), Max: 99.9 (27 Aug 2021 00:00)  HR: 68 (27 Aug 2021 15:00) (66 - 82)  BP: 115/59 (27 Aug 2021 15:00) (100/48 - 149/65)  BP(mean): 79 (27 Aug 2021 15:00) (62 - 97)  RR: 16 (27 Aug 2021 15:00) (16 - 31)  SpO2: 100% (27 Aug 2021 15:00) (86% - 100%)    MEDICATIONS  (STANDING):  acetylcysteine 10%  Inhalation 3 milliLiter(s) Inhalation three times a day  acetylcysteine 20%  Inhalation 4 milliLiter(s) Inhalation every 6 hours  ALBUTerol    90 MICROgram(s) HFA Inhaler 2 Puff(s) Inhalation every 6 hours  aspirin  chewable 81 milliGRAM(s) Oral daily  atorvastatin 20 milliGRAM(s) Oral at bedtime  chlorhexidine 0.12% Liquid 15 milliLiter(s) Oral Mucosa every 12 hours  chlorhexidine 4% Liquid 1 Application(s) Topical <User Schedule>  dextrose 5%. 1000 milliLiter(s) (100 mL/Hr) IV Continuous <Continuous>  enoxaparin Injectable 40 milliGRAM(s) SubCutaneous daily  glucagon  Injectable 1 milliGRAM(s) IntraMuscular once  insulin NPH human recombinant 15 Unit(s) SubCutaneous every 6 hours  insulin regular Infusion 1 Unit(s)/Hr (1 mL/Hr) IV Continuous <Continuous>  levETIRAcetam  IVPB 1000 milliGRAM(s) IV Intermittent <User Schedule>  midazolam Infusion. 0.2 mG/kG/Hr (17.6 mL/Hr) IV Continuous <Continuous>  midazolam Injectable 4 milliGRAM(s) IV Push once  pantoprazole   Suspension 40 milliGRAM(s) Oral daily  polyethylene glycol 3350 17 Gram(s) Oral daily  senna 2 Tablet(s) Oral at bedtime  thiamine IVPB 500 milliGRAM(s) IV Intermittent every 8 hours                        10.7   7.97  )-----------( 123      ( 27 Aug 2021 04:30 )             33.1   08-27    141  |  107  |  17  ----------------------------<  352<H>  5.1<H>   |  25  |  1.1    Ca    8.8      27 Aug 2021 04:30  Mg     2.1     08-27    TPro  4.8<L>  /  Alb  2.9<L>  /  TBili  0.3  /  DBili  x   /  AST  26  /  ALT  22  /  AlkPhos  118<H>  08-27  Mode: AC/ CMV (Assist Control/ Continuous Mandatory Ventilation)  RR (machine): 16  TV (machine): 420  FiO2: 35  PEEP: 5  ITime: 1  MAP: 9  PIP: 17  ABG - ( 27 Aug 2021 03:13 )  pH, Arterial: 7.37  pH, Blood: x     /  pCO2: 43    /  pO2: 94    / HCO3: 25    / Base Excess: -0.6  /  SaO2: 98.5      no phos level

## 2021-08-27 NOTE — EEG REPORT - NS EEG TEXT BOX
Epilepsy Attending Note:     JOSÉ MANUEL PORTER    71y Male  MRN [id number]    Vital Signs Last 24 Hrs  T(C): 36.7 (27 Aug 2021 08:00), Max: 37.7 (27 Aug 2021 00:00)  T(F): 98.1 (27 Aug 2021 08:00), Max: 99.9 (27 Aug 2021 00:00)  HR: 72 (27 Aug 2021 08:00) (60 - 82)  BP: 118/58 (27 Aug 2021 08:00) (100/48 - 149/65)  BP(mean): 81 (27 Aug 2021 08:00) (65 - 97)  RR: 17 (27 Aug 2021 08:00) (16 - 31)  SpO2: 100% (27 Aug 2021 08:00) (86% - 100%)                          10.7   7.97  )-----------( 123      ( 27 Aug 2021 04:30 )             33.1       08-27    141  |  107  |  17  ----------------------------<  352<H>  5.1<H>   |  25  |  1.1    Ca    8.8      27 Aug 2021 04:30  Mg     2.1     08-27    TPro  4.8<L>  /  Alb  2.9<L>  /  TBili  0.3  /  DBili  x   /  AST  26  /  ALT  22  /  AlkPhos  118<H>  08-27      MEDICATIONS  (STANDING):  aspirin  chewable 81 milliGRAM(s) Oral daily  atorvastatin 20 milliGRAM(s) Oral at bedtime  chlorhexidine 0.12% Liquid 15 milliLiter(s) Oral Mucosa every 12 hours  chlorhexidine 4% Liquid 1 Application(s) Topical <User Schedule>  dextrose 5%. 1000 milliLiter(s) (100 mL/Hr) IV Continuous <Continuous>  enoxaparin Injectable 40 milliGRAM(s) SubCutaneous daily  glucagon  Injectable 1 milliGRAM(s) IntraMuscular once  insulin NPH human recombinant 15 Unit(s) SubCutaneous every 6 hours  insulin regular Infusion 1 Unit(s)/Hr (1 mL/Hr) IV Continuous <Continuous>  levETIRAcetam  IVPB 1000 milliGRAM(s) IV Intermittent <User Schedule>  midazolam Infusion. 0.2 mG/kG/Hr (17.6 mL/Hr) IV Continuous <Continuous>  midazolam Injectable 4 milliGRAM(s) IV Push once  norepinephrine Infusion 0.05 MICROgram(s)/kG/Min (8.27 mL/Hr) IV Continuous <Continuous>  pantoprazole   Suspension 40 milliGRAM(s) Oral daily  polyethylene glycol 3350 17 Gram(s) Oral daily  senna 2 Tablet(s) Oral at bedtime  thiamine IVPB 500 milliGRAM(s) IV Intermittent every 8 hours    MEDICATIONS  (PRN):  fentaNYL    Injectable 50 MICROGram(s) IV Push once PRN pain  midazolam Injectable 4 milliGRAM(s) IV Push once PRN anxiety            VEEG in the last 24 hours:  sedated and intubated    Background----------------continues, low amplitude , sleep recording  with minimal reactivity    Focal and generalized slowing.-----------  no focal slowing. Moderate generalized slowing    Interictal activity------------  none    Events------------  none    Seizures------------  none    Impression:   abnormal due to the generalized slowing perhaps secondary to toxic metabolic cause     Plan -  as/NCC team

## 2021-08-27 NOTE — PROGRESS NOTE ADULT - ASSESSMENT
70 yo diabetic, admitted with HHS/DKA s/p clinical tonic-clonic seizure x2 qualifying for status epilepticus on keppra 3g/d and versed gtt, course c/b respiratory failure, distributive shock, encephalopathy, metabolic derangements, currently remains mechanically ventilated.    Plan:    Neurological:  - Neuro Checks Q4H (coma/pupils)  - Activate a CODE Stroke with change in Neuro exam  - Turn Versed off this AM  - Continue vEEG  - Continue Keppra 1g Q8H, if patient has seizure, will add Vimpat  - Continue Thiamine  - Daily Statin  - Continue ASA 81 mg daily    Cardiovascular:  - SBP goal: Normotension  - Titrate levophed to MAP > 65  - Normovolemia  - Normothermia    - EKG today    Pulmonary:  - Vent Settings: 16 420 35 5   - HOB 45  - Recommend Plateau pressure < 30 to maintain venous drainage  - Recommend PRN Fentanyl bolus for vent synchrony  - CXR/ABG daily     GI/:  - Diet: Glucerna 1.2 continuous   - Bowel Regimen: Senna/Colace  - Strict Is/Os  - Continue Basilio catheter  - PPI daily    Electrolytes:  - Replete as needed.  - Mg > 2  - Normoglycemia- Continue insulin drip  - Start nighttime Lantus  - Hyperkalemia- Will continue to monitor given patient is on insulin gtt  - FW Q6H (sodium downtrending)    ID:  - Trend Fever curve and WBC  - Off Rocephin    Hematology:  - SCDs and SQ Lovenox    FLUIDS/ELECTROLYTES/NUTRITION  -IVF: No needs  -Monitor, Replete to K>4 and Mg>2  -Diet: EN  PROPHYLAXIS  -DVT: SQ  -GI: PPI    Code Status: F    Dispo: ICU    Discussed with Attending Physician: [id number]        Lines:    PIV [x]  CVC [x] Day # 3  Arterial Line [ ] Day #  Basilio [x] Day #3  MV [x] Day #3  EVD [ ] Day #     72 yo diabetic, admitted with HHS/DKA s/p clinical tonic-clonic seizure x2 qualifying for status epilepticus on keppra 3g/d and versed gtt, course c/b respiratory failure, distributive shock, encephalopathy, metabolic derangements, currently remains mechanically ventilated.    Plan:    Neurological:  - Neuro Checks Q2H. OK for general neuro checks  - Activate a CODE Stroke with change in Neuro exam  - Turn Versed off this AM. Allow patient to clear out of system for more accurate neuro exam  - Continue vEEG  - Continue Keppra 1g Q8H, if patient has seizure, will add Vimpat  - Continue Thiamine 500   - Daily Statin  - Continue ASA 81 mg daily    Cardiovascular:  - SBP goal: Normotension  - Titrate levophed to MAP > 65  - Normovolemia  - Normothermia    - EKG today    Pulmonary:  - Vent Settings: 16 420 35 5   - HOB 45  - Recommend Plateau pressure < 30 to maintain venous drainage  - Recommend PRN Fentanyl bolus for vent synchrony  - CXR/ABG daily   - Avoid all sedation. Precedex OK for agitation PRN  -Saline bullets as needed for copious secretions    GI/:  - Diet: Glucerna 1.2 continuous   - Bowel Regimen: Senna/Colace  - Strict Is/Os  - Continue Basilio catheter  - PPI daily  - Last BM? Miralax BID--> Mg+ citrate   - Ammonia f/u     Electrolytes:  - Replete as needed  - Mg > 2  - Normoglycemia- Continue insulin drip  - Start nighttime Lantus  - Hyperkalemia- Will continue to monitor given patient is on insulin gtt  - FW Q6H (discontinue)    ID:  - Trend Fever curve and WBC- afebrile  - Off Rocephin    Hematology:  - SCDs and SQ Lovenox    FLUIDS/ELECTROLYTES/NUTRITION  -IVF: No needs  -Monitor, Replete to K>4 and Mg>2  -Diet: EN  PROPHYLAXIS  -DVT: SQ  -GI: PPI    Code Status: F    Dispo: ICU    Discussed with Attending Physician: Schwab -Juda [facility]urndorfer [id number]        Lines:    PIV [x]  CVC [x] Day # 3  Arterial Line [ ] Day #  Basilio [x] Day #3  MV [x] Day #3  EVD [ ] Day #     72 yo diabetic, admitted with HHS/DKA s/p clinical tonic-clonic seizure x2 qualifying for status epilepticus on keppra 3g/d and versed gtt, course c/b respiratory failure, distributive shock, encephalopathy, metabolic derangements, currently remains mechanically ventilated.    status epilepticus  HHS/DKA  acute encephalopathy  acute respiratory failure      Plan:    Neurological:  - Neuro Checks Q2H  - Continue vEEG  - Continue Keppra 1g Q8H, versed gtt off- still lingering in system and pt comatose, will monitor on vEEG  - Continue Thiamine 500 IV  - Daily Statin  - Continue ASA 81 mg daily  -PT/OT, splints on all four  -MR with small acute L occipital infarct, prior vessel imaging stable  -prn precedex/fentanyl iv for agitation    Cardiovascular:   off pressors, MAP >65  -TTe with normal BiV function  -ASA    Pulmonary:  - vent support, lung protective settings  - HOB 45  - Recommend Plateau pressure < 30 to maintain venous drainage  - CXR reviewed  - Avoid all sedation. Precedex OK for agitation PRN  -pulmonary toilet- albuterol/mucomyst    GI/:  - Diet: Glucerna 1.2 continuous   - Bowel Regimen: senna/miralax/mag citrate   - Strict Is/Os  - Continue Basilio catheter  - PPI daily    Electrolytes:  - Replete as needed  - Normoglycemia- Continue insulin drip, to be weaned off  -NPH q6  - Hyperkalemia- Will continue to monitor given patient is on insulin gtt    ID:  - Trend Fever curve and WBC- afebrile  - completed course of Rocephin    Hematology:  - SCDs and SQ Lovenox    FLUIDS/ELECTROLYTES/NUTRITION  -IVF: No needs  -Monitor, Replete to K>4 and Mg>2  -Diet: EN  PROPHYLAXIS  -DVT: SQ  -GI: PPI    Code Status: F    Dispo: ICU    Discussed with Attending Physician: Schwab -Juda Zurndorfer [facility]  #[id number]        Lines:    PIV [x]  CVC [x] Day # 3  Arterial Line [ ] Day #  [place]/bladder scan q6  MV [x] Day #3  EVD [ ] Day #

## 2021-08-27 NOTE — PROCEDURE NOTE - NSPROCDETAILS_GEN_ALL_CORE
patient pre-oxygenated, tube inserted, placement confirmed
location identified, draped/prepped, sterile technique used, needle inserted/introduced/positive blood return obtained via catheter/connected to a pressurized flush line/sutured in place/hemostasis with direct pressure, dressing applied/all materials/supplies accounted for at end of procedure
guidewire recovered/lumen(s) aspirated and flushed/sterile dressing applied/ultrasound guidance with use of sterile gel and probe cove

## 2021-08-27 NOTE — PROGRESS NOTE ADULT - ASSESSMENT
IMP:  - status epilepticus  - intubated for airway protection  - DKA     est REE ~ 2140 kcal & protein needs per CCM ~ 135 gm/d  agree with use of Glucerna 1.2 as Rx  increase Glucerna 1.2 to 70 ml/h and add 3 packets Prosource TF per day --> 132 gm pro & 2115 k/d  (note pt NOT on propofol)  check phos with am labs, joseph post large dose insulin treatments

## 2021-08-27 NOTE — PROGRESS NOTE ADULT - SUBJECTIVE AND OBJECTIVE BOX
SUMMARY:   72 y/o M with pmh of dm, osteomyelitis, gerd, depression, hld, htn presenting to the ED for above cc.   the patient was doing last night until he was unable to talk and walk independently. As per his ex wife the right sided then started shaking. She called 911.   On presentation the patient had status epilepticus and he received 4 mg of lorazepam.   CT brain done and an acute stroke could not be excluded because of technical difficulties.  His blood glucose was found to be more than 600.   he is admitted for [facility]    the ex wife said that he has bipolar and he was on a medication, although this is not found in the chart. She also does not recall his meds  sp intubation for airway protection, on propofol, INSULI, 1/2 ns 150 cc/h (24 Aug 2021 04:34)      OVERNIGHT EVENTS: Patient remained on Versed gtt, taken off pressors, insulin drip needed to be restarted.     SEDATION SCORES:  RASS: -5 CAM-ICU: 0    REVIEW OF SYSTEMS: Negative except for that of HPI    VITALS: [x] Reviewed    ICU Vital Signs Last 24 Hrs  T(C): 37.3 (27 Aug 2021 02:00), Max: 37.7 (27 Aug 2021 00:00)  T(F): 99.1 (27 Aug 2021 02:00), Max: 99.9 (27 Aug 2021 00:00)  HR: 72 (27 Aug 2021 07:00) (60 - 82)  BP: 111/54 (27 Aug 2021 06:00) (91/47 - 149/65)  BP(mean): 73 (27 Aug 2021 06:00) (58 - 97)  ABP: --  ABP(mean): --  RR: 16 (27 Aug 2021 07:00) (16 - 31)  SpO2: 100% (27 Aug 2021 07:00) (86% - 100%)    EXAMINATION:  General: No acute distress  HEENT: Anicteric sclerae  Cardiac: [id number]  Lungs: Clear  Abdomen: Soft, non-tender, +BS  Extremities: No c/c/e  Skin/Incision Site: Clean, dry and intact  Neurologic: GSC 3, eyes midline, - blink to threat, +gag/cough, face symmetric, tongue midline, no response to noxious stimuli throughout, no spontaneous movement,    LABS:                          10.7   7.97  )-----------( 123      ( 27 Aug 2021 04:30 )             33.1   08-27    141  |  107  |  17  ----------------------------<  352<H>  5.1<H>   |  25  |  1.1    Ca    8.8      27 Aug 2021 04:30  Mg     2.1     08-27    TPro  4.8<L>  /  Alb  2.9<L>  /  TBili  0.3  /  DBili  x   /  AST  26  /  ALT  22  /  AlkPhos  118<H>  08-27    CAPILLARY BLOOD GLUCOSE      POCT Blood Glucose.: 246 mg/dL (27 Aug 2021 08:11)    ABG - ( 27 Aug 2021 03:13 )  pH, Arterial: 7.37  pH, Blood: x     /  pCO2: 43    /  pO2: 94    / HCO3: 25    / Base Excess: -0.6  /  SaO2: 98.5      Mode: AC/ CMV (Assist Control/ Continuous Mandatory Ventilation)  RR (machine): 16  TV (machine): 420  FiO2: 35  PEEP: 5  ITime: 1  MAP: 9  PIP: 23    Drug Levels: [] N/A    CULTURES:  Culture Results:   Normal Respiratory Chelsy present (08-24 @ 14:30)  Culture Results:   No growth (08-24 @ 06:11)      IMAGING/DATA: [x] Reviewed    < from: MR Head No Cont (08.26.21 @ 16:15) >  EXAM:  MR BRAIN            PROCEDURE DATE:  08/26/2021      < end of copied text >  < from: MR Head No Cont (08.26.21 @ 16:15) >  IMPRESSION:  Punctate acute infarct involving the left occipital cortex. No evidence of hemorrhage.    Mild chronic microvascular changes.    < end of copied text >    EEG REPORT:   EEG Report:  · EEG Report	  Epilepsy Attending Note:Background-----------continues, slightly higher amplitude and mor reactive reaching frequencies in the range of 5-6 hz     Focal and generalized slowing-----moderate generalized slowing    Interictal activity----------- rare bifrontal-FP sharp transients    Events-----------  none    Seizures--------  none    Impression:   abnormal as above    IVF FLUIDS/MEDICATIONS: [] Reviewed    ALLERGIES: Allergies    No Known Allergies    Intolerances    MEDICATIONS  (STANDING):  aspirin  chewable 81 milliGRAM(s) Oral daily  atorvastatin 20 milliGRAM(s) Oral at bedtime  chlorhexidine 0.12% Liquid 15 milliLiter(s) Oral Mucosa every 12 hours  chlorhexidine 4% Liquid 1 Application(s) Topical <User Schedule>  dextrose 5%. 1000 milliLiter(s) (100 mL/Hr) IV Continuous <Continuous>  enoxaparin Injectable 40 milliGRAM(s) SubCutaneous daily  glucagon  Injectable 1 milliGRAM(s) IntraMuscular once  insulin NPH human recombinant 15 Unit(s) SubCutaneous every 6 hours  insulin regular Infusion 1 Unit(s)/Hr (1 mL/Hr) IV Continuous <Continuous>  levETIRAcetam  IVPB 1000 milliGRAM(s) IV Intermittent <User Schedule>  midazolam Infusion. 0.2 mG/kG/Hr (17.6 mL/Hr) IV Continuous <Continuous>  midazolam Injectable 4 milliGRAM(s) IV Push once  norepinephrine Infusion 0.05 MICROgram(s)/kG/Min (8.27 mL/Hr) IV Continuous <Continuous>  pantoprazole   Suspension 40 milliGRAM(s) Oral daily  polyethylene glycol 3350 17 Gram(s) Oral daily  senna 2 Tablet(s) Oral at bedtime  thiamine IVPB 500 milliGRAM(s) IV Intermittent every 8 hours    MEDICATIONS  (PRN):  fentaNYL    Injectable 50 MICROGram(s) IV Push once PRN pain  midazolam Injectable 4 milliGRAM(s) IV Push once PRN anxiety      DEVICES:   [] Restraints [x] PIVs [x] ET tube [x] central line [] PICC [] arterial line [x] lubin [x] NGT/OGT [] EVD [] LD [] EVANGELISTA/HMV [] LiCOX [] ICP monitor [] Trach [] PEG [] Chest Tube [] other:     SUMMARY:   72 y/o M with pmh of dm, osteomyelitis, gerd, depression, hld, htn presenting to the ED for above cc.   the patient was doing last night until he was unable to talk and walk independently. As per his ex wife the right sided then started shaking. She called 911.   On presentation the patient had status epilepticus and he received 4 mg of lorazepam.   CT brain done and an acute stroke could not be excluded because of technical difficulties.  His blood glucose was found to be more than 600.   he is admitted for [facility]    the ex wife said that he has bipolar and he was on a medication, although this is not found in the chart. She also does not recall his meds  sp intubation for airway protection, on propofol, INSULI, 1/2 ns 150 cc/h (24 Aug 2021 04:34)      OVERNIGHT EVENTS: Patient remained on Versed gtt, taken off pressors, insulin drip needed to be restarted.     REVIEW OF SYSTEMS: Negative except for that of HPI    EXAMINATION:  General: No acute distress, sedated and intubated  HEENT: Anicteric sclerae  Cardiac: [id number]  Lungs: Clear  Abdomen: Soft, non-tender, +BS  Extremities: No c/c/e  Skin/Incision Site: Clean, dry and intact  Neurologic: under effects of versed (comatose) eyes midline, perrl miotic, - blink to threat, +gag/cough, face symmetric, tongue midline, no response to noxious stimuli throughout, no spontaneous movement,      ICU Vital Signs Last 24 Hrs  T(C): 36.7 (27 Aug 2021 08:00), Max: 37.7 (27 Aug 2021 00:00)  T(F): 98.1 (27 Aug 2021 08:00), Max: 99.9 (27 Aug 2021 00:00)  HR: 68 (27 Aug 2021 08:51) (62 - 82)  BP: 118/58 (27 Aug 2021 08:00) (100/48 - 149/65)  BP(mean): 81 (27 Aug 2021 08:00) (65 - 97)  ABP: --  ABP(mean): --  RR: 17 (27 Aug 2021 08:00) (16 - 31)  SpO2: 100% (27 Aug 2021 08:51) (86% - 100%)      08-26-21 @ 07:01 - 08-27-21 @ 07:00  --------------------------------------------------------  IN: 2881.3 mL / OUT: 1995 mL / NET: 886.3 mL    08-27-21 @ 07:01  -  08-27-21 @ 12:33  --------------------------------------------------------  IN: 111 mL / OUT: 125 mL / NET: -14 mL        Mode: AC/ CMV (Assist Control/ Continuous Mandatory Ventilation), RR (machine): 16, TV (machine): 420, FiO2: 35, PEEP: 5, ITime: 1, MAP: 9, PIP: 17    aspirin  chewable 81 milliGRAM(s) Oral daily  atorvastatin 20 milliGRAM(s) Oral at bedtime  chlorhexidine 0.12% Liquid 15 milliLiter(s) Oral Mucosa every 12 hours  chlorhexidine 4% Liquid 1 Application(s) Topical <User Schedule>  dextrose 5%. 1000 milliLiter(s) (100 mL/Hr) IV Continuous <Continuous>  enoxaparin Injectable 40 milliGRAM(s) SubCutaneous daily  fentaNYL    Injectable 50 MICROGram(s) IV Push once PRN  glucagon  Injectable 1 milliGRAM(s) IntraMuscular once  insulin NPH human recombinant 15 Unit(s) SubCutaneous every 6 hours  insulin regular Infusion 1 Unit(s)/Hr (1 mL/Hr) IV Continuous <Continuous>  levETIRAcetam  IVPB 1000 milliGRAM(s) IV Intermittent <User Schedule>  magnesium citrate Oral Solution 1 Bottle Oral once  midazolam Infusion. 0.2 mG/kG/Hr (17.6 mL/Hr) IV Continuous <Continuous>  midazolam Injectable 4 milliGRAM(s) IV Push once PRN  midazolam Injectable 4 milliGRAM(s) IV Push once  pantoprazole   Suspension 40 milliGRAM(s) Oral daily  polyethylene glycol 3350 17 Gram(s) Oral daily  senna 2 Tablet(s) Oral at bedtime  thiamine IVPB 500 milliGRAM(s) IV Intermittent every 8 hours      LABS:  Na: 141 (08-27 @ 04:30), 143 (08-26 @ 11:10), 132 (08-26 @ 04:30), 141 (08-25 @ 04:30), 140 (08-24 @ 14:45)  K: 5.1 (08-27 @ 04:30), 4.5 (08-26 @ 11:10), 4.2 (08-26 @ 04:30), 3.8 (08-25 @ 04:30), 4.3 (08-24 @ 14:45)  Cl: 107 (08-27 @ 04:30), 109 (08-26 @ 11:10), 97 (08-26 @ 04:30), 105 (08-25 @ 04:30), 102 (08-24 @ 14:45)  CO2: 25 (08-27 @ 04:30), 26 (08-26 @ 11:10), 22 (08-26 @ 04:30), 27 (08-25 @ 04:30), 26 (08-24 @ 14:45)  BUN: 17 (08-27 @ 04:30), 16 (08-26 @ 11:10), 13 (08-26 @ 04:30), 22 (08-25 @ 04:30), 25 (08-24 @ 14:45)  Cr: 1.1 (08-27 @ 04:30), 1.1 (08-26 @ 11:10), 0.5 (08-26 @ 04:30), 1.1 (08-25 @ 04:30), 1.4 (08-24 @ 14:45)  Glu: 352(08-27 @ 04:30), 334(08-26 @ 11:10), 343(08-26 @ 04:30), 177(08-25 @ 04:30), 147(08-24 @ 14:45)    Hgb: 10.7 (08-27 @ 04:30), 11.2 (08-26 @ 04:30), 11.3 (08-25 @ 04:30)  Hct: 33.1 (08-27 @ 04:30), 34.6 (08-26 @ 04:30), 34.2 (08-25 @ 04:30)  WBC: 7.97 (08-27 @ 04:30), 7.17 (08-26 @ 04:30), 9.56 (08-25 @ 04:30)  Plt: 123 (08-27 @ 04:30), 106 (08-26 @ 04:30), 132 (08-25 @ 04:30)    INR:   PTT:           LIVER FUNCTIONS - ( 27 Aug 2021 04:30 )  Alb: 2.9 g/dL / Pro: 4.8 g/dL / ALK PHOS: 118 U/L / ALT: 22 U/L / AST: 26 U/L / GGT: x           ABG - ( 27 Aug 2021 03:13 )  pH, Arterial: 7.37  pH, Blood: x     /  pCO2: 43    /  pO2: 94    / HCO3: 25    / Base Excess: -0.6  /  SaO2: 98.5                      IMAGING/DATA: [x] Reviewed    < from: MR Head No Cont (08.26.21 @ 16:15) >  EXAM:  MR BRAIN            PROCEDURE DATE:  08/26/2021      < end of copied text >  < from: MR Head No Cont (08.26.21 @ 16:15) >  IMPRESSION:  Punctate acute infarct involving the left occipital cortex. No evidence of hemorrhage.    Mild chronic microvascular changes.    < end of copied text >    EEG REPORT:   EEG Report:  · EEG Report	  Epilepsy Attending Note:Background-----------continues, slightly higher amplitude and mor reactive reaching frequencies in the range of 5-6 hz     Focal and generalized slowing-----moderate generalized slowing    Interictal activity----------- rare bifrontal-FP sharp transients    Events-----------  none    Seizures--------  none    Impression:   abnormal as above

## 2021-08-28 LAB
ALBUMIN SERPL ELPH-MCNC: 3 G/DL — LOW (ref 3.5–5.2)
ALP SERPL-CCNC: 109 U/L — SIGNIFICANT CHANGE UP (ref 30–115)
ALT FLD-CCNC: 35 U/L — SIGNIFICANT CHANGE UP (ref 0–41)
AMPHET UR-MCNC: NEGATIVE — SIGNIFICANT CHANGE UP
ANION GAP SERPL CALC-SCNC: 10 MMOL/L — SIGNIFICANT CHANGE UP (ref 7–14)
ANION GAP SERPL CALC-SCNC: 8 MMOL/L — SIGNIFICANT CHANGE UP (ref 7–14)
ANION GAP SERPL CALC-SCNC: 9 MMOL/L — SIGNIFICANT CHANGE UP (ref 7–14)
APPEARANCE UR: CLEAR — SIGNIFICANT CHANGE UP
AST SERPL-CCNC: 40 U/L — SIGNIFICANT CHANGE UP (ref 0–41)
BACTERIA # UR AUTO: NEGATIVE — SIGNIFICANT CHANGE UP
BARBITURATES UR SCN-MCNC: NEGATIVE — SIGNIFICANT CHANGE UP
BASE EXCESS BLDA CALC-SCNC: 4.3 MMOL/L — HIGH (ref -2–3)
BENZODIAZ UR-MCNC: POSITIVE
BILIRUB SERPL-MCNC: 0.3 MG/DL — SIGNIFICANT CHANGE UP (ref 0.2–1.2)
BILIRUB UR-MCNC: NEGATIVE — SIGNIFICANT CHANGE UP
BUN SERPL-MCNC: 20 MG/DL — SIGNIFICANT CHANGE UP (ref 10–20)
BUN SERPL-MCNC: 21 MG/DL — HIGH (ref 10–20)
BUN SERPL-MCNC: 22 MG/DL — HIGH (ref 10–20)
CALCIUM SERPL-MCNC: 8.5 MG/DL — SIGNIFICANT CHANGE UP (ref 8.5–10.1)
CALCIUM SERPL-MCNC: 8.7 MG/DL — SIGNIFICANT CHANGE UP (ref 8.5–10.1)
CALCIUM SERPL-MCNC: 8.7 MG/DL — SIGNIFICANT CHANGE UP (ref 8.5–10.1)
CHLORIDE SERPL-SCNC: 108 MMOL/L — SIGNIFICANT CHANGE UP (ref 98–110)
CHLORIDE SERPL-SCNC: 108 MMOL/L — SIGNIFICANT CHANGE UP (ref 98–110)
CHLORIDE SERPL-SCNC: 109 MMOL/L — SIGNIFICANT CHANGE UP (ref 98–110)
CO2 SERPL-SCNC: 26 MMOL/L — SIGNIFICANT CHANGE UP (ref 17–32)
CO2 SERPL-SCNC: 27 MMOL/L — SIGNIFICANT CHANGE UP (ref 17–32)
CO2 SERPL-SCNC: 27 MMOL/L — SIGNIFICANT CHANGE UP (ref 17–32)
COCAINE METAB.OTHER UR-MCNC: NEGATIVE — SIGNIFICANT CHANGE UP
COLOR SPEC: YELLOW — SIGNIFICANT CHANGE UP
CREAT SERPL-MCNC: 1 MG/DL — SIGNIFICANT CHANGE UP (ref 0.7–1.5)
DIFF PNL FLD: ABNORMAL
EPI CELLS # UR: 1 /HPF — SIGNIFICANT CHANGE UP (ref 0–5)
GAS PNL BLDA: SIGNIFICANT CHANGE UP
GLUCOSE BLDC GLUCOMTR-MCNC: 176 MG/DL — HIGH (ref 70–99)
GLUCOSE BLDC GLUCOMTR-MCNC: 208 MG/DL — HIGH (ref 70–99)
GLUCOSE BLDC GLUCOMTR-MCNC: 218 MG/DL — HIGH (ref 70–99)
GLUCOSE BLDC GLUCOMTR-MCNC: 219 MG/DL — HIGH (ref 70–99)
GLUCOSE BLDC GLUCOMTR-MCNC: 223 MG/DL — HIGH (ref 70–99)
GLUCOSE BLDC GLUCOMTR-MCNC: 226 MG/DL — HIGH (ref 70–99)
GLUCOSE BLDC GLUCOMTR-MCNC: 233 MG/DL — HIGH (ref 70–99)
GLUCOSE BLDC GLUCOMTR-MCNC: 260 MG/DL — HIGH (ref 70–99)
GLUCOSE BLDC GLUCOMTR-MCNC: 265 MG/DL — HIGH (ref 70–99)
GLUCOSE BLDC GLUCOMTR-MCNC: 270 MG/DL — HIGH (ref 70–99)
GLUCOSE BLDC GLUCOMTR-MCNC: 273 MG/DL — HIGH (ref 70–99)
GLUCOSE BLDC GLUCOMTR-MCNC: 283 MG/DL — HIGH (ref 70–99)
GLUCOSE BLDC GLUCOMTR-MCNC: 285 MG/DL — HIGH (ref 70–99)
GLUCOSE SERPL-MCNC: 222 MG/DL — HIGH (ref 70–99)
GLUCOSE SERPL-MCNC: 223 MG/DL — HIGH (ref 70–99)
GLUCOSE SERPL-MCNC: 295 MG/DL — HIGH (ref 70–99)
GLUCOSE UR QL: ABNORMAL
HCO3 BLDA-SCNC: 30 MMOL/L — HIGH (ref 21–28)
HCT VFR BLD CALC: 32.9 % — LOW (ref 42–52)
HGB BLD-MCNC: 10.6 G/DL — LOW (ref 14–18)
HOROWITZ INDEX BLDA+IHG-RTO: 35 — SIGNIFICANT CHANGE UP
HYALINE CASTS # UR AUTO: 0 /LPF — SIGNIFICANT CHANGE UP (ref 0–7)
KETONES UR-MCNC: SIGNIFICANT CHANGE UP
LEUKOCYTE ESTERASE UR-ACNC: NEGATIVE — SIGNIFICANT CHANGE UP
LEVETIRACETAM SERPL-MCNC: 35.8 UG/ML — SIGNIFICANT CHANGE UP (ref 10–40)
LIDOCAIN IGE QN: 55 U/L — SIGNIFICANT CHANGE UP (ref 7–60)
MAGNESIUM SERPL-MCNC: 2.4 MG/DL — SIGNIFICANT CHANGE UP (ref 1.8–2.4)
MCHC RBC-ENTMCNC: 28.9 PG — SIGNIFICANT CHANGE UP (ref 27–31)
MCHC RBC-ENTMCNC: 32.2 G/DL — SIGNIFICANT CHANGE UP (ref 32–37)
MCV RBC AUTO: 89.6 FL — SIGNIFICANT CHANGE UP (ref 80–94)
METHADONE UR-MCNC: NEGATIVE — SIGNIFICANT CHANGE UP
NITRITE UR-MCNC: NEGATIVE — SIGNIFICANT CHANGE UP
NRBC # BLD: 0 /100 WBCS — SIGNIFICANT CHANGE UP (ref 0–0)
OPIATES UR-MCNC: NEGATIVE — SIGNIFICANT CHANGE UP
PCO2 BLDA: 47 MMHG — SIGNIFICANT CHANGE UP (ref 35–48)
PCP SPEC-MCNC: SIGNIFICANT CHANGE UP
PH BLDA: 7.41 — SIGNIFICANT CHANGE UP (ref 7.35–7.45)
PH UR: 6 — SIGNIFICANT CHANGE UP (ref 5–8)
PHOSPHATE SERPL-MCNC: 4.3 MG/DL — SIGNIFICANT CHANGE UP (ref 2.1–4.9)
PLATELET # BLD AUTO: 143 K/UL — SIGNIFICANT CHANGE UP (ref 130–400)
PO2 BLDA: 88 MMHG — SIGNIFICANT CHANGE UP (ref 83–108)
POTASSIUM SERPL-MCNC: 4.8 MMOL/L — SIGNIFICANT CHANGE UP (ref 3.5–5)
POTASSIUM SERPL-MCNC: 4.9 MMOL/L — SIGNIFICANT CHANGE UP (ref 3.5–5)
POTASSIUM SERPL-MCNC: 5.4 MMOL/L — HIGH (ref 3.5–5)
POTASSIUM SERPL-SCNC: 4.8 MMOL/L — SIGNIFICANT CHANGE UP (ref 3.5–5)
POTASSIUM SERPL-SCNC: 4.9 MMOL/L — SIGNIFICANT CHANGE UP (ref 3.5–5)
POTASSIUM SERPL-SCNC: 5.4 MMOL/L — HIGH (ref 3.5–5)
PROPOXYPHENE QUALITATIVE URINE RESULT: NEGATIVE — SIGNIFICANT CHANGE UP
PROT SERPL-MCNC: 4.9 G/DL — LOW (ref 6–8)
PROT UR-MCNC: ABNORMAL
RBC # BLD: 3.67 M/UL — LOW (ref 4.7–6.1)
RBC # FLD: 14.1 % — SIGNIFICANT CHANGE UP (ref 11.5–14.5)
RBC CASTS # UR COMP ASSIST: 18 /HPF — HIGH (ref 0–4)
SAO2 % BLDA: 99.1 % — HIGH (ref 94–98)
SODIUM SERPL-SCNC: 143 MMOL/L — SIGNIFICANT CHANGE UP (ref 135–146)
SODIUM SERPL-SCNC: 144 MMOL/L — SIGNIFICANT CHANGE UP (ref 135–146)
SODIUM SERPL-SCNC: 145 MMOL/L — SIGNIFICANT CHANGE UP (ref 135–146)
SP GR SPEC: 1.03 — SIGNIFICANT CHANGE UP (ref 1.01–1.03)
UROBILINOGEN FLD QL: SIGNIFICANT CHANGE UP
WBC # BLD: 7.22 K/UL — SIGNIFICANT CHANGE UP (ref 4.8–10.8)
WBC # FLD AUTO: 7.22 K/UL — SIGNIFICANT CHANGE UP (ref 4.8–10.8)
WBC UR QL: 4 /HPF — SIGNIFICANT CHANGE UP (ref 0–5)

## 2021-08-28 PROCEDURE — 93010 ELECTROCARDIOGRAM REPORT: CPT

## 2021-08-28 PROCEDURE — 99291 CRITICAL CARE FIRST HOUR: CPT

## 2021-08-28 PROCEDURE — 70450 CT HEAD/BRAIN W/O DYE: CPT | Mod: 26

## 2021-08-28 PROCEDURE — 95720 EEG PHY/QHP EA INCR W/VEEG: CPT

## 2021-08-28 PROCEDURE — 71045 X-RAY EXAM CHEST 1 VIEW: CPT | Mod: 26

## 2021-08-28 RX ORDER — HUMAN INSULIN 100 [IU]/ML
20 INJECTION, SUSPENSION SUBCUTANEOUS EVERY 6 HOURS
Refills: 0 | Status: DISCONTINUED | OUTPATIENT
Start: 2021-08-28 | End: 2021-08-28

## 2021-08-28 RX ORDER — FENTANYL CITRATE 50 UG/ML
25 INJECTION INTRAVENOUS
Refills: 0 | Status: DISCONTINUED | OUTPATIENT
Start: 2021-08-28 | End: 2021-08-31

## 2021-08-28 RX ORDER — INSULIN LISPRO 100/ML
6 VIAL (ML) SUBCUTANEOUS ONCE
Refills: 0 | Status: COMPLETED | OUTPATIENT
Start: 2021-08-28 | End: 2021-08-28

## 2021-08-28 RX ORDER — LABETALOL HCL 100 MG
10 TABLET ORAL ONCE
Refills: 0 | Status: COMPLETED | OUTPATIENT
Start: 2021-08-28 | End: 2021-08-28

## 2021-08-28 RX ORDER — LACOSAMIDE 50 MG/1
100 TABLET ORAL EVERY 12 HOURS
Refills: 0 | Status: DISCONTINUED | OUTPATIENT
Start: 2021-08-28 | End: 2021-08-28

## 2021-08-28 RX ORDER — INSULIN LISPRO 100/ML
3 VIAL (ML) SUBCUTANEOUS ONCE
Refills: 0 | Status: COMPLETED | OUTPATIENT
Start: 2021-08-28 | End: 2021-08-28

## 2021-08-28 RX ORDER — ACETYLCYSTEINE 200 MG/ML
3 VIAL (ML) MISCELLANEOUS THREE TIMES A DAY
Refills: 0 | Status: DISCONTINUED | OUTPATIENT
Start: 2021-08-28 | End: 2021-08-28

## 2021-08-28 RX ORDER — INSULIN LISPRO 100/ML
VIAL (ML) SUBCUTANEOUS EVERY 4 HOURS
Refills: 0 | Status: DISCONTINUED | OUTPATIENT
Start: 2021-08-28 | End: 2021-08-29

## 2021-08-28 RX ORDER — SODIUM ZIRCONIUM CYCLOSILICATE 10 G/10G
10 POWDER, FOR SUSPENSION ORAL ONCE
Refills: 0 | Status: COMPLETED | OUTPATIENT
Start: 2021-08-28 | End: 2021-08-28

## 2021-08-28 RX ORDER — ACETYLCYSTEINE 200 MG/ML
3 VIAL (ML) MISCELLANEOUS EVERY 6 HOURS
Refills: 0 | Status: COMPLETED | OUTPATIENT
Start: 2021-08-28 | End: 2021-08-30

## 2021-08-28 RX ORDER — HUMAN INSULIN 100 [IU]/ML
23 INJECTION, SUSPENSION SUBCUTANEOUS EVERY 6 HOURS
Refills: 0 | Status: DISCONTINUED | OUTPATIENT
Start: 2021-08-28 | End: 2021-08-29

## 2021-08-28 RX ORDER — THIAMINE MONONITRATE (VIT B1) 100 MG
500 TABLET ORAL EVERY 8 HOURS
Refills: 0 | Status: COMPLETED | OUTPATIENT
Start: 2021-08-28 | End: 2021-08-29

## 2021-08-28 RX ORDER — ACETAMINOPHEN 500 MG
650 TABLET ORAL EVERY 6 HOURS
Refills: 0 | Status: DISCONTINUED | OUTPATIENT
Start: 2021-08-28 | End: 2021-09-02

## 2021-08-28 RX ORDER — DEXMEDETOMIDINE HYDROCHLORIDE IN 0.9% SODIUM CHLORIDE 4 UG/ML
0.2 INJECTION INTRAVENOUS
Qty: 400 | Refills: 0 | Status: DISCONTINUED | OUTPATIENT
Start: 2021-08-28 | End: 2021-08-31

## 2021-08-28 RX ADMIN — Medication 3 UNIT(S): at 02:38

## 2021-08-28 RX ADMIN — CHLORHEXIDINE GLUCONATE 15 MILLILITER(S): 213 SOLUTION TOPICAL at 05:21

## 2021-08-28 RX ADMIN — Medication 105 MILLIGRAM(S): at 13:28

## 2021-08-28 RX ADMIN — Medication 10 MILLIGRAM(S): at 08:00

## 2021-08-28 RX ADMIN — Medication 650 MILLIGRAM(S): at 18:29

## 2021-08-28 RX ADMIN — Medication 105 MILLIGRAM(S): at 21:15

## 2021-08-28 RX ADMIN — Medication 4: at 18:28

## 2021-08-28 RX ADMIN — LEVETIRACETAM 400 MILLIGRAM(S): 250 TABLET, FILM COATED ORAL at 13:28

## 2021-08-28 RX ADMIN — Medication 3 MILLILITER(S): at 20:31

## 2021-08-28 RX ADMIN — ALBUTEROL 2 PUFF(S): 90 AEROSOL, METERED ORAL at 03:15

## 2021-08-28 RX ADMIN — HUMAN INSULIN 15 UNIT(S): 100 INJECTION, SUSPENSION SUBCUTANEOUS at 02:40

## 2021-08-28 RX ADMIN — ENOXAPARIN SODIUM 40 MILLIGRAM(S): 100 INJECTION SUBCUTANEOUS at 13:25

## 2021-08-28 RX ADMIN — HUMAN INSULIN 20 UNIT(S): 100 INJECTION, SUSPENSION SUBCUTANEOUS at 13:29

## 2021-08-28 RX ADMIN — CHLORHEXIDINE GLUCONATE 1 APPLICATION(S): 213 SOLUTION TOPICAL at 05:21

## 2021-08-28 RX ADMIN — ALBUTEROL 2 PUFF(S): 90 AEROSOL, METERED ORAL at 20:32

## 2021-08-28 RX ADMIN — Medication 81 MILLIGRAM(S): at 13:25

## 2021-08-28 RX ADMIN — FENTANYL CITRATE 25 MICROGRAM(S): 50 INJECTION INTRAVENOUS at 14:11

## 2021-08-28 RX ADMIN — Medication 3 MILLILITER(S): at 08:20

## 2021-08-28 RX ADMIN — Medication 6: at 05:14

## 2021-08-28 RX ADMIN — PANTOPRAZOLE SODIUM 40 MILLIGRAM(S): 20 TABLET, DELAYED RELEASE ORAL at 13:26

## 2021-08-28 RX ADMIN — LEVETIRACETAM 400 MILLIGRAM(S): 250 TABLET, FILM COATED ORAL at 21:14

## 2021-08-28 RX ADMIN — SODIUM ZIRCONIUM CYCLOSILICATE 10 GRAM(S): 10 POWDER, FOR SUSPENSION ORAL at 08:46

## 2021-08-28 RX ADMIN — LACOSAMIDE 120 MILLIGRAM(S): 50 TABLET ORAL at 05:31

## 2021-08-28 RX ADMIN — Medication 6: at 00:20

## 2021-08-28 RX ADMIN — ATORVASTATIN CALCIUM 20 MILLIGRAM(S): 80 TABLET, FILM COATED ORAL at 21:14

## 2021-08-28 RX ADMIN — Medication 6 UNIT(S): at 07:11

## 2021-08-28 RX ADMIN — LACOSAMIDE 140 MILLIGRAM(S): 50 TABLET ORAL at 00:18

## 2021-08-28 RX ADMIN — LEVETIRACETAM 400 MILLIGRAM(S): 250 TABLET, FILM COATED ORAL at 05:22

## 2021-08-28 RX ADMIN — FENTANYL CITRATE 25 MICROGRAM(S): 50 INJECTION INTRAVENOUS at 15:28

## 2021-08-28 RX ADMIN — CHLORHEXIDINE GLUCONATE 15 MILLILITER(S): 213 SOLUTION TOPICAL at 18:28

## 2021-08-28 RX ADMIN — SENNA PLUS 2 TABLET(S): 8.6 TABLET ORAL at 21:14

## 2021-08-28 RX ADMIN — HUMAN INSULIN 20 UNIT(S): 100 INJECTION, SUSPENSION SUBCUTANEOUS at 18:29

## 2021-08-28 RX ADMIN — Medication 6: at 22:26

## 2021-08-28 RX ADMIN — Medication 3 MILLILITER(S): at 14:49

## 2021-08-28 RX ADMIN — Medication 105 MILLIGRAM(S): at 05:22

## 2021-08-28 RX ADMIN — ALBUTEROL 2 PUFF(S): 90 AEROSOL, METERED ORAL at 08:20

## 2021-08-28 RX ADMIN — Medication 2: at 13:29

## 2021-08-28 RX ADMIN — Medication 2 MILLIGRAM(S): at 02:38

## 2021-08-28 RX ADMIN — Medication 650 MILLIGRAM(S): at 17:44

## 2021-08-28 RX ADMIN — ALBUTEROL 2 PUFF(S): 90 AEROSOL, METERED ORAL at 14:48

## 2021-08-28 NOTE — PROGRESS NOTE ADULT - SUBJECTIVE AND OBJECTIVE BOX
SUMMARY:   72 y/o M with pmh of dm, osteomyelitis, gerd, depression, hld, htn presenting to the ED for above cc.   the patient was doing last night until he was unable to talk and walk independently. As per his ex wife the right sided then started shaking. She called 911.   On presentation the patient had status epilepticus and he received 4 mg of lorazepam.   CT brain done and an acute stroke could not be excluded because of technical difficulties.  His blood glucose was found to be more than 600.   he is admitted for [facility]    the ex wife said that he has bipolar and he was on a medication, although this is not found in the chart. She also does not recall his meds  sp intubation for airway protection, on propofol, INSULI, 1/2 ns 150 cc/h (24 Aug 2021 04:34)    OVERNIGHT EVENTS: Patient remained on Video EEG, possible seizure activity. Ativan 2mg IV x2 given. Elevated Potassium = 5.4, Lokelma given x1, pending repeat potassium value. No ECG changes noted. Unequal pupils L>R, stat CTH completed.     REVIEW OF SYSTEMS: Negative except for that of HPI    EXAMINATION:  General: ill-appearing, mechanically vented, male of appropriately stated age, on no sedation, lying supine in bed. NAD  Head: Atraumatic, normocephalic  ENT: No tonsillar erythema, exudates, or enlargement; Moist mucous membranes, Good dentition, No lesions  Eyes: EOMI, PERRLA, conjunctiva and sclera clear  NECK: Supple, No JVD  Chest/Lungs: B/L good air entry, B/L CTA; No rales, rhonchi, or wheezing.   Heart: S1S2 normal, no S3, Regular rate and rhythm; No murmurs, rubs, or gallops  Abdomen: Soft, Nontender, Nondistended; Bowel sounds present  Extremities:  2+ Peripheral Pulses, No clubbing, cyanosis, or edema  Lymph: No lymphadenopathy noted  Skin: No rashes or lesions    Neurological:  Mental status: Mechanically vented, no sedation. Does not open eyes to voice or noxious stimuli. Does not track to voice. Does not follow simple or complex commands  Cranial Nerves: Pupils left 4mm sluggish, right 2mm sluggish. (+) blink to threat bilaterally. No gaze preference or nystagmus noted. (+) corneal reflex bilaterally (+) oculocephalic reflex (+) cough reflex (+) gag reflex. Face grossly symmetrical with no loss of nasolabial folds.   Motor: Grossly deconditioned. No movement to noxious stimuli bilaterally throughout.   Sensation: Facial grimacing noted to noxious stimuli bilaterally throughout.     ICU Vital Signs Last 24 Hrs:  T(C): 36.2 (28 Aug 2021 04:00), Max: 37.1 (27 Aug 2021 20:00)  T(F): 97.1 (28 Aug 2021 04:00), Max: 98.8 (27 Aug 2021 20:00)  HR: 78 (28 Aug 2021 07:00) (58 - 86)  BP: 100/54 (27 Aug 2021 16:00) (100/49 - 116/57)  BP(mean): 65 (27 Aug 2021 16:00) (62 - 79)  ABP: 174/68 (28 Aug 2021 07:00) (104/48 - 202/60)  ABP(mean): 108 (28 Aug 2021 07:00) (68 - 108)  RR: 23 (28 Aug 2021 07:00) (15 - 27)  SpO2: 100% (28 Aug 2021 07:00) (100% - 100%)    Ventilator:  Mode: AC/ CMV (Assist Control/ Continuous Mandatory Ventilation)  RR (machine): 16  TV (machine): 420  FiO2: 35  PEEP: 5  ITime: 1  MAP: 10  PIP: 20    I&O's Detail:  27 Aug 2021 07:01  -  28 Aug 2021 07:00  --------------------------------------------------------  IN:    Dexmedetomidine: 15.4 mL    Dexmedetomidine: 30.8 mL    Glucerna: 1420 mL    Insulin: 56 mL    IV PiggyBack: 350 mL  Total IN: 1872.2 mL    OUT:    Indwelling Catheter - Urethral (mL): 575 mL    Intermittent Catheterization - Urethral (mL): 685 mL  Total OUT: 1260 mL    Total NET: 612.2 mL    Labs:  08-28    144  |  108  |  20  ----------------------------<  295<H>  5.4<H>   |  27  |  1.0    Ca    8.7      28 Aug 2021 03:18  Phos  4.3     08-28  Mg     2.4     08-28    TPro  4.9<L>  /  Alb  3.0<L>  /  TBili  0.3  /  DBili  x   /  AST  40  /  ALT  35  /  AlkPhos  109  08-28               10.6   7.22  )-----------( 143      ( 28 Aug 2021 03:18 )             32.9     LIVER FUNCTIONS - ( 28 Aug 2021 03:18 )  Alb: 3.0 g/dL / Pro: 4.9 g/dL / ALK PHOS: 109 U/L / ALT: 35 U/L / AST: 40 U/L / GGT: x           ABG - ( 28 Aug 2021 03:29 )  pH, Arterial: 7.41  pH, Blood: x     /  pCO2: 47    /  pO2: 88    / HCO3: 30    / Base Excess: 4.3   /  SaO2: 99.1      Microbiology:  Culture - Blood (collected 26 Aug 2021 11:48)  Source: .Blood Blood-Arterial  Preliminary Report (27 Aug 2021 23:02):    No growth to date.    IMAGING/DATA: [x] Reviewed    < from: MR Head No Cont (08.26.21 @ 16:15) >  EXAM:  MR BRAIN            PROCEDURE DATE:  08/26/2021               < end of copied text >  < from: MR Head No Cont (08.26.21 @ 16:15) >  IMPRESSION:  Punctate acute infarct involving the left occipital cortex. No evidence of hemorrhage.    Mild chronic microvascular changes.    < end of copied text >    EEG REPORT:   EEG Report:  · EEG Report	  Epilepsy Attending Note:Background-----------continues, slightly higher amplitude and mor reactive reaching frequencies in the range of 5-6 hz     Focal and generalized slowing-----moderate generalized slowing    Interictal activity----------- rare bifrontal-FP sharp transients    Events-----------  none    Seizures--------  none    Impression:   abnormal as above   SUMMARY:   70 y/o M with pmh of dm, osteomyelitis, gerd, depression, hld, htn presenting to the ED for above cc.   the patient was doing last night until he was unable to talk and walk independently. As per his ex wife the right sided then started shaking. She called 911.   On presentation the patient had status epilepticus and he received 4 mg of lorazepam.   CT brain done and an acute stroke could not be excluded because of technical difficulties.  His blood glucose was found to be more than 600.   he is admitted for [facility]    the ex wife said that he has bipolar and he was on a medication, although this is not found in the chart. She also does not recall his meds  sp intubation for airway protection, on propofol, INSULI, 1/2 ns 150 cc/h (24 Aug 2021 04:34)    OVERNIGHT EVENTS: Patient remained on Video EEG, possible seizure activity. Ativan 2mg IV x2 given. Elevated Potassium = 5.4, Lokelma given x1, pending repeat potassium value. No ECG changes noted. Unequal pupils L>R, stat CTH completed and stable     REVIEW OF SYSTEMS: Negative except for that of HPI    EXAMINATION:  General: ill-appearing, mechanically vented, male of appropriately stated age, on no sedation, lying supine in bed. NAD  Head: Atraumatic, normocephalic  ENT: No tonsillar erythema, exudates, or enlargement; Moist mucous membranes, Good dentition, No lesions  Eyes: EOMI, PERRLA, conjunctiva and sclera clear  NECK: Supple, No JVD  Chest/Lungs: B/L good air entry, B/L CTA; No rales, rhonchi, or wheezing.   Heart: S1S2 normal, no S3, Regular rate and rhythm; No murmurs, rubs, or gallops  Abdomen: Soft, Nontender, Nondistended; Bowel sounds present  Extremities:  2+ Peripheral Pulses, No clubbing, cyanosis, or edema  Lymph: No lymphadenopathy noted  Skin: No rashes or lesions    Neurological:  Mental status: Mechanically vented, no sedation. Does not open eyes to voice or noxious stimuli. Does not track to voice. Does not follow simple or complex commands  Cranial Nerves: Pupils left 4mm sluggish, right 2mm sluggish. (+) blink to threat bilaterally. No gaze preference or nystagmus noted. (+) corneal reflex bilaterally (+) oculocephalic reflex (+) cough reflex (+) gag reflex. Face grossly symmetrical with no loss of nasolabial folds.   Motor: Grossly deconditioned. No movement to noxious stimuli bilaterally throughout.   Sensation: Facial grimacing noted to noxious stimuli bilaterally throughout.     ICU Vital Signs Last 24 Hrs:  T(C): 36.2 (28 Aug 2021 04:00), Max: 37.1 (27 Aug 2021 20:00)  T(F): 97.1 (28 Aug 2021 04:00), Max: 98.8 (27 Aug 2021 20:00)  HR: 78 (28 Aug 2021 07:00) (58 - 86)  BP: 100/54 (27 Aug 2021 16:00) (100/49 - 116/57)  BP(mean): 65 (27 Aug 2021 16:00) (62 - 79)  ABP: 174/68 (28 Aug 2021 07:00) (104/48 - 202/60)  ABP(mean): 108 (28 Aug 2021 07:00) (68 - 108)  RR: 23 (28 Aug 2021 07:00) (15 - 27)  SpO2: 100% (28 Aug 2021 07:00) (100% - 100%)    Ventilator:  Mode: AC/ CMV (Assist Control/ Continuous Mandatory Ventilation)  RR (machine): 16  TV (machine): 420  FiO2: 35  PEEP: 5  ITime: 1  MAP: 10  PIP: 20    I&O's Detail:  27 Aug 2021 07:01  -  28 Aug 2021 07:00  --------------------------------------------------------  IN:    Dexmedetomidine: 15.4 mL    Dexmedetomidine: 30.8 mL    Glucerna: 1420 mL    Insulin: 56 mL    IV PiggyBack: 350 mL  Total IN: 1872.2 mL    OUT:    Indwelling Catheter - Urethral (mL): 575 mL    Intermittent Catheterization - Urethral (mL): 685 mL  Total OUT: 1260 mL    Total NET: 612.2 mL    Labs:  08-28    144  |  108  |  20  ----------------------------<  295<H>  5.4<H>   |  27  |  1.0    Ca    8.7      28 Aug 2021 03:18  Phos  4.3     08-28  Mg     2.4     08-28    TPro  4.9<L>  /  Alb  3.0<L>  /  TBili  0.3  /  DBili  x   /  AST  40  /  ALT  35  /  AlkPhos  109  08-28               10.6   7.22  )-----------( 143      ( 28 Aug 2021 03:18 )             32.9     LIVER FUNCTIONS - ( 28 Aug 2021 03:18 )  Alb: 3.0 g/dL / Pro: 4.9 g/dL / ALK PHOS: 109 U/L / ALT: 35 U/L / AST: 40 U/L / GGT: x           ABG - ( 28 Aug 2021 03:29 )  pH, Arterial: 7.41  pH, Blood: x     /  pCO2: 47    /  pO2: 88    / HCO3: 30    / Base Excess: 4.3   /  SaO2: 99.1      Microbiology:  Culture - Blood (collected 26 Aug 2021 11:48)  Source: .Blood Blood-Arterial  Preliminary Report (27 Aug 2021 23:02):    No growth to date.    IMAGING/DATA: [x] Reviewed    < from: MR Head No Cont (08.26.21 @ 16:15) >  EXAM:  MR BRAIN            PROCEDURE DATE:  08/26/2021               < end of copied text >  < from: MR Head No Cont (08.26.21 @ 16:15) >  IMPRESSION:  Punctate acute infarct involving the left occipital cortex. No evidence of hemorrhage.    Mild chronic microvascular changes.    < end of copied text >    EEG REPORT:   EEG Report:  · EEG Report	  Epilepsy Attending Note:Background-----------continues, slightly higher amplitude and mor reactive reaching frequencies in the range of 5-6 hz     Focal and generalized slowing-----moderate generalized slowing    Interictal activity----------- rare bifrontal-FP sharp transients    Events-----------  none    Seizures--------  none    Impression:   abnormal as above

## 2021-08-28 NOTE — PROGRESS NOTE ADULT - ASSESSMENT
Assessment: 71-year-old diabetic, admitted with HHS/DKA s/p clinical tonic-clonic seizure x2 qualifying for status epilepticus on keppra 3g/d and versed gtt, course c/b respiratory failure, distributive shock, encephalopathy, metabolic derangements, currently remains mechanically ventilated.    status epilepticus  HHS/DKA  acute encephalopathy  acute respiratory failure      Plan:  Neurological:  - Neuro Checks Q2H  - Repeat CTH overnight, F/U official read  - Continue vEEG, F/U read   - Continue Keppra 1g Q8H, versed gtt off- still lingering in system and pt comatose, will monitor on vEEG  - Continue Thiamine 500 IV  - Daily Statin  - Continue ASA 81 mg daily  - PT/OT, splints on all four  - MR with small acute L occipital infarct, prior vessel imaging stable  - Precedex/Fentanyl IV prn for agitation    Cardiovascular:  - off pressors, MAP >65  - TTE, 8/24, with normal BiV function  - ASA    Pulmonary:  - Vent support, lung protective settings  - HOB 45  - Recommend Plateau pressure < 30 to maintain venous drainage  - CXR reviewed  - Avoid all sedation. Precedex OK for agitation PRN  - Pulmonary toilet- Albuterol/Mucomyst    GI/:  - Diet: Glucerna 1.2 continuous   - Bowel Regimen: senna/miralax/mag citrate   - Strict Is/Os  - Continue Basilio catheter  - PPI daily  - Last BM: 8/28    Electrolytes:  - Replete as needed  - Keep normoglycemia  - NPH q6hrs w/ corrective sliding scale  - Hyperkalemia- Will continue to monitor given patient is on insulin gtt    ID:  - Trend Fever curve and WBC- afebrile  - Completed course of Rocephin    Hematology:  - SCDs and SQ Lovenox    FLUIDS/ELECTROLYTES/NUTRITION  -IVF: No needs  -Monitor, Replete to K>4 and Mg>2  -Diet: EN  PROPHYLAXIS  -DVT: SQ  -GI: PPI    Lines:  PIV [x] x2  CVC [x] Right IJ MLC, 8/24, Day #4  Arterial Line [X] Right radial Arterial Line, 8/27, Day #1  [place]/bladder scan q6hrs  MV [x] ETT 7.5, Day #4  EVD [ ] Day #    Code Status: Full    Dispo: ICU    Discussed with Attending Physician: Schwab Jennifer Holtzbach, [facility]  #[id number]           Assessment: 71-year-old diabetic, admitted with HHS/DKA s/p clinical tonic-clonic seizure x2 qualifying for status epilepticus on keppra 3g/d and versed gtt, course c/b respiratory failure, distributive shock, encephalopathy, metabolic derangements, currently remains mechanically ventilated.    status epilepticus  HHS/DKA  acute encephalopathy  acute respiratory failure      Plan:  Neurological:  - Neuro Checks Q2H  - Repeat CTH overnight- stable  - Continue vEEG, as versed gtt washes out of system, c/w keppra 3/d  - Continue Thiamine 500 IV   - Daily Statin  - Continue ASA 81 mg daily  - PT/OT, splints on all four  - MR with small acute L occipital infarct, prior vessel imaging stable  - Precedex/Fentanyl IV prn for agitation    Cardiovascular:  - off pressors, MAP >65  - TTE, 8/24, with normal BiV function  - ASA, statin    Pulmonary:  - Vent support, lung protective settings; PSV trials  -etco2  - HOB 45  - Recommend Plateau pressure < 30 to maintain venous drainage  - CXR reviewed  - Pulmonary toilet- Albuterol/Mucomyst    GI/:  - Diet: Glucerna 1.2 continuous   - Bowel Regimen: senna/miralax/mag citrate   - Strict Is/Os  - Continue Basilio catheter  - PPI daily  - Last BM: 8/28    Electrolytes:  - Replete as needed  - Keep normoglycemia  - NPH q6hrs w/ corrective sliding scale  -hyperK s/p lokelma, repeat bmp    Endo:  -nph q6, correction ISS, goal FSBS 140-180  a1c >15.5    ID:  - Trend Fever curve and WBC- afebrile  - Completed course of Rocephin  -pct 0.1    Hematology:  - SCDs and SQ Lovenox    FLUIDS/ELECTROLYTES/NUTRITION  -IVF: No needs  -Monitor, Replete to K>4 and Mg>2  -Diet: EN  PROPHYLAXIS  -DVT: SQ  -GI: PPI    Lines:  PIV [x] x2  CVC [x] Right IJ MLC, 8/24, Day #4  Arterial Line [X] Right radial Arterial Line, 8/27, Day #1  [place]/bladder scan q6hrs  MV [x] ETT 7.5, Day #5  EVD [ ] Day #    Code Status: Full    Dispo: ICU    Discussed with Attending Physician: Schwab Jennifer Holtzbach, [facility]  #[id number]

## 2021-08-28 NOTE — EEG REPORT - NS EEG TEXT BOX
Epilepsy Attending Note:     JOSÉ MANUEL PORTER    71y Male  MRN [id number]    Vital Signs Last 24 Hrs  T(C): 36.8 (28 Aug 2021 08:00), Max: 37.1 (27 Aug 2021 20:00)  T(F): 98.2 (28 Aug 2021 08:00), Max: 98.8 (27 Aug 2021 20:00)  HR: 78 (28 Aug 2021 10:35) (58 - 90)  BP: 100/54 (27 Aug 2021 16:00) (100/54 - 115/59)  BP(mean): 65 (27 Aug 2021 16:00) (65 - 79)  RR: 17 (28 Aug 2021 09:00) (15 - 27)  SpO2: 100% (28 Aug 2021 10:35) (100% - 100%)                          10.6   7.22  )-----------( 143      ( 28 Aug 2021 03:18 )             32.9       08-28    145  |  109  |  22<H>  ----------------------------<  222<H>  4.8   |  26  |  1.0    Ca    8.7      28 Aug 2021 09:02  Phos  4.3     08-28  Mg     2.4     08-28    TPro  4.9<L>  /  Alb  3.0<L>  /  TBili  0.3  /  DBili  x   /  AST  40  /  ALT  35  /  AlkPhos  109  08-28      MEDICATIONS  (STANDING):  acetylcysteine 10%  Inhalation 3 milliLiter(s) Inhalation every 6 hours  ALBUTerol    90 MICROgram(s) HFA Inhaler 2 Puff(s) Inhalation every 6 hours  aspirin  chewable 81 milliGRAM(s) Oral daily  atorvastatin 20 milliGRAM(s) Oral at bedtime  chlorhexidine 0.12% Liquid 15 milliLiter(s) Oral Mucosa every 12 hours  chlorhexidine 4% Liquid 1 Application(s) Topical <User Schedule>  dexMEDEtomidine Infusion 0.2 MICROgram(s)/kG/Hr (4.41 mL/Hr) IV Continuous <Continuous>  dextrose 5%. 1000 milliLiter(s) (100 mL/Hr) IV Continuous <Continuous>  enoxaparin Injectable 40 milliGRAM(s) SubCutaneous daily  glucagon  Injectable 1 milliGRAM(s) IntraMuscular once  insulin lispro (ADMELOG) corrective regimen sliding scale   SubCutaneous every 6 hours  insulin NPH human recombinant 20 Unit(s) SubCutaneous every 6 hours  levETIRAcetam  IVPB 1000 milliGRAM(s) IV Intermittent <User Schedule>  pantoprazole   Suspension 40 milliGRAM(s) Oral daily  senna 2 Tablet(s) Oral at bedtime  thiamine IVPB 500 milliGRAM(s) IV Intermittent every 8 hours    MEDICATIONS  (PRN):  fentaNYL    Injectable 25 MICROGram(s) IV Push every 2 hours PRN Agitation, vent dyssynchrony            VEEG in the last 24 hours:    intubated partially sedated      Background------------   symmetrical, low amplitude, showing different , fluctuating level of reactivity  ( ? of different level of sedation)    Focal and generalized slowing-------moderate generalized slowing     Interictal activity------------none    Events-----------  at 10 pm one event was reported. This was not associated with epileptiform changes from the baseline EEG. Also on video review  no behavioral changes suggestive of a clinical seizure was seen.    Seizures-----------  none    Impression:     abnormal due to the presence of generalized slowing    Plan -   as/NCC team

## 2021-08-29 LAB
ALBUMIN SERPL ELPH-MCNC: 2.7 G/DL — LOW (ref 3.5–5.2)
ALP SERPL-CCNC: 123 U/L — HIGH (ref 30–115)
ALT FLD-CCNC: 39 U/L — SIGNIFICANT CHANGE UP (ref 0–41)
ANION GAP SERPL CALC-SCNC: 8 MMOL/L — SIGNIFICANT CHANGE UP (ref 7–14)
AST SERPL-CCNC: 43 U/L — HIGH (ref 0–41)
BASE EXCESS BLDA CALC-SCNC: 4.3 MMOL/L — HIGH (ref -2–3)
BASE EXCESS BLDA CALC-SCNC: 7.2 MMOL/L — HIGH (ref -2–3)
BASOPHILS # BLD AUTO: 0 K/UL — SIGNIFICANT CHANGE UP (ref 0–0.2)
BASOPHILS NFR BLD AUTO: 0 % — SIGNIFICANT CHANGE UP (ref 0–1)
BILIRUB SERPL-MCNC: 0.2 MG/DL — SIGNIFICANT CHANGE UP (ref 0.2–1.2)
BUN SERPL-MCNC: 28 MG/DL — HIGH (ref 10–20)
CALCIUM SERPL-MCNC: 8.8 MG/DL — SIGNIFICANT CHANGE UP (ref 8.5–10.1)
CHLORIDE SERPL-SCNC: 104 MMOL/L — SIGNIFICANT CHANGE UP (ref 98–110)
CO2 SERPL-SCNC: 27 MMOL/L — SIGNIFICANT CHANGE UP (ref 17–32)
CREAT SERPL-MCNC: 0.9 MG/DL — SIGNIFICANT CHANGE UP (ref 0.7–1.5)
EOSINOPHIL # BLD AUTO: 0.32 K/UL — SIGNIFICANT CHANGE UP (ref 0–0.7)
EOSINOPHIL NFR BLD AUTO: 3.5 % — SIGNIFICANT CHANGE UP (ref 0–8)
GAS PNL BLDA: SIGNIFICANT CHANGE UP
GAS PNL BLDA: SIGNIFICANT CHANGE UP
GIANT PLATELETS BLD QL SMEAR: PRESENT — SIGNIFICANT CHANGE UP
GLUCOSE BLDC GLUCOMTR-MCNC: 215 MG/DL — HIGH (ref 70–99)
GLUCOSE BLDC GLUCOMTR-MCNC: 220 MG/DL — HIGH (ref 70–99)
GLUCOSE BLDC GLUCOMTR-MCNC: 221 MG/DL — HIGH (ref 70–99)
GLUCOSE BLDC GLUCOMTR-MCNC: 227 MG/DL — HIGH (ref 70–99)
GLUCOSE BLDC GLUCOMTR-MCNC: 236 MG/DL — HIGH (ref 70–99)
GLUCOSE BLDC GLUCOMTR-MCNC: 244 MG/DL — HIGH (ref 70–99)
GLUCOSE SERPL-MCNC: 242 MG/DL — HIGH (ref 70–99)
GRAM STN FLD: SIGNIFICANT CHANGE UP
HCO3 BLDA-SCNC: 30 MMOL/L — HIGH (ref 21–28)
HCO3 BLDA-SCNC: 33 MMOL/L — HIGH (ref 21–28)
HCT VFR BLD CALC: 32.3 % — LOW (ref 42–52)
HGB BLD-MCNC: 10.1 G/DL — LOW (ref 14–18)
HOROWITZ INDEX BLDA+IHG-RTO: 30 — SIGNIFICANT CHANGE UP
HOROWITZ INDEX BLDA+IHG-RTO: 35 — SIGNIFICANT CHANGE UP
HYPOCHROMIA BLD QL: SLIGHT — SIGNIFICANT CHANGE UP
LYMPHOCYTES # BLD AUTO: 0.47 K/UL — LOW (ref 1.2–3.4)
LYMPHOCYTES # BLD AUTO: 5.2 % — LOW (ref 20.5–51.1)
MAGNESIUM SERPL-MCNC: 2.5 MG/DL — HIGH (ref 1.8–2.4)
MANUAL SMEAR VERIFICATION: SIGNIFICANT CHANGE UP
MCHC RBC-ENTMCNC: 28.7 PG — SIGNIFICANT CHANGE UP (ref 27–31)
MCHC RBC-ENTMCNC: 31.3 G/DL — LOW (ref 32–37)
MCV RBC AUTO: 91.8 FL — SIGNIFICANT CHANGE UP (ref 80–94)
MONOCYTES # BLD AUTO: 0.55 K/UL — SIGNIFICANT CHANGE UP (ref 0.1–0.6)
MONOCYTES NFR BLD AUTO: 6.1 % — SIGNIFICANT CHANGE UP (ref 1.7–9.3)
NEUTROPHILS # BLD AUTO: 7.68 K/UL — HIGH (ref 1.4–6.5)
NEUTROPHILS NFR BLD AUTO: 85.2 % — HIGH (ref 42.2–75.2)
PCO2 BLDA: 47 MMHG — SIGNIFICANT CHANGE UP (ref 35–48)
PCO2 BLDA: 48 MMHG — SIGNIFICANT CHANGE UP (ref 35–48)
PH BLDA: 7.41 — SIGNIFICANT CHANGE UP (ref 7.35–7.45)
PH BLDA: 7.44 — SIGNIFICANT CHANGE UP (ref 7.35–7.45)
PHOSPHATE SERPL-MCNC: 4.6 MG/DL — SIGNIFICANT CHANGE UP (ref 2.1–4.9)
PLAT MORPH BLD: NORMAL — SIGNIFICANT CHANGE UP
PLATELET # BLD AUTO: 160 K/UL — SIGNIFICANT CHANGE UP (ref 130–400)
PO2 BLDA: 111 MMHG — HIGH (ref 83–108)
PO2 BLDA: 94 MMHG — SIGNIFICANT CHANGE UP (ref 83–108)
POLYCHROMASIA BLD QL SMEAR: SLIGHT — SIGNIFICANT CHANGE UP
POTASSIUM SERPL-MCNC: 4.9 MMOL/L — SIGNIFICANT CHANGE UP (ref 3.5–5)
POTASSIUM SERPL-SCNC: 4.9 MMOL/L — SIGNIFICANT CHANGE UP (ref 3.5–5)
PROCALCITONIN SERPL-MCNC: 0.13 NG/ML — HIGH (ref 0.02–0.1)
PROT SERPL-MCNC: 4.9 G/DL — LOW (ref 6–8)
RBC # BLD: 3.52 M/UL — LOW (ref 4.7–6.1)
RBC # FLD: 14 % — SIGNIFICANT CHANGE UP (ref 11.5–14.5)
RBC BLD AUTO: ABNORMAL
SAO2 % BLDA: 98.5 % — HIGH (ref 94–98)
SAO2 % BLDA: 98.9 % — HIGH (ref 94–98)
SODIUM SERPL-SCNC: 139 MMOL/L — SIGNIFICANT CHANGE UP (ref 135–146)
SPECIMEN SOURCE: SIGNIFICANT CHANGE UP
WBC # BLD: 9.01 K/UL — SIGNIFICANT CHANGE UP (ref 4.8–10.8)
WBC # FLD AUTO: 9.01 K/UL — SIGNIFICANT CHANGE UP (ref 4.8–10.8)

## 2021-08-29 PROCEDURE — 95720 EEG PHY/QHP EA INCR W/VEEG: CPT

## 2021-08-29 PROCEDURE — 99291 CRITICAL CARE FIRST HOUR: CPT

## 2021-08-29 RX ORDER — LACOSAMIDE 50 MG/1
100 TABLET ORAL EVERY 12 HOURS
Refills: 0 | Status: DISCONTINUED | OUTPATIENT
Start: 2021-08-29 | End: 2021-09-02

## 2021-08-29 RX ORDER — DOXAZOSIN MESYLATE 4 MG
2 TABLET ORAL AT BEDTIME
Refills: 0 | Status: DISCONTINUED | OUTPATIENT
Start: 2021-08-29 | End: 2021-09-01

## 2021-08-29 RX ORDER — LACOSAMIDE 50 MG/1
200 TABLET ORAL ONCE
Refills: 0 | Status: DISCONTINUED | OUTPATIENT
Start: 2021-08-29 | End: 2021-08-29

## 2021-08-29 RX ORDER — HUMAN INSULIN 100 [IU]/ML
26 INJECTION, SUSPENSION SUBCUTANEOUS EVERY 6 HOURS
Refills: 0 | Status: DISCONTINUED | OUTPATIENT
Start: 2021-08-29 | End: 2021-08-29

## 2021-08-29 RX ORDER — INSULIN HUMAN 100 [IU]/ML
INJECTION, SOLUTION SUBCUTANEOUS EVERY 6 HOURS
Refills: 0 | Status: DISCONTINUED | OUTPATIENT
Start: 2021-08-29 | End: 2021-08-31

## 2021-08-29 RX ORDER — HUMAN INSULIN 100 [IU]/ML
30 INJECTION, SUSPENSION SUBCUTANEOUS EVERY 6 HOURS
Refills: 0 | Status: DISCONTINUED | OUTPATIENT
Start: 2021-08-29 | End: 2021-08-30

## 2021-08-29 RX ADMIN — Medication 4: at 02:15

## 2021-08-29 RX ADMIN — LEVETIRACETAM 400 MILLIGRAM(S): 250 TABLET, FILM COATED ORAL at 05:16

## 2021-08-29 RX ADMIN — CHLORHEXIDINE GLUCONATE 1 APPLICATION(S): 213 SOLUTION TOPICAL at 05:47

## 2021-08-29 RX ADMIN — FENTANYL CITRATE 25 MICROGRAM(S): 50 INJECTION INTRAVENOUS at 12:17

## 2021-08-29 RX ADMIN — CHLORHEXIDINE GLUCONATE 15 MILLILITER(S): 213 SOLUTION TOPICAL at 05:16

## 2021-08-29 RX ADMIN — Medication 3 MILLILITER(S): at 14:11

## 2021-08-29 RX ADMIN — Medication 2 MILLIGRAM(S): at 21:08

## 2021-08-29 RX ADMIN — Medication 105 MILLIGRAM(S): at 05:16

## 2021-08-29 RX ADMIN — Medication 3 MILLILITER(S): at 02:12

## 2021-08-29 RX ADMIN — INSULIN HUMAN 4: 100 INJECTION, SOLUTION SUBCUTANEOUS at 18:57

## 2021-08-29 RX ADMIN — HUMAN INSULIN 26 UNIT(S): 100 INJECTION, SUSPENSION SUBCUTANEOUS at 13:46

## 2021-08-29 RX ADMIN — LEVETIRACETAM 400 MILLIGRAM(S): 250 TABLET, FILM COATED ORAL at 13:05

## 2021-08-29 RX ADMIN — CHLORHEXIDINE GLUCONATE 15 MILLILITER(S): 213 SOLUTION TOPICAL at 18:19

## 2021-08-29 RX ADMIN — HUMAN INSULIN 26 UNIT(S): 100 INJECTION, SUSPENSION SUBCUTANEOUS at 06:04

## 2021-08-29 RX ADMIN — ENOXAPARIN SODIUM 40 MILLIGRAM(S): 100 INJECTION SUBCUTANEOUS at 12:17

## 2021-08-29 RX ADMIN — ATORVASTATIN CALCIUM 20 MILLIGRAM(S): 80 TABLET, FILM COATED ORAL at 21:08

## 2021-08-29 RX ADMIN — FENTANYL CITRATE 25 MICROGRAM(S): 50 INJECTION INTRAVENOUS at 13:41

## 2021-08-29 RX ADMIN — INSULIN HUMAN 4: 100 INJECTION, SOLUTION SUBCUTANEOUS at 13:46

## 2021-08-29 RX ADMIN — Medication 105 MILLIGRAM(S): at 14:41

## 2021-08-29 RX ADMIN — LEVETIRACETAM 400 MILLIGRAM(S): 250 TABLET, FILM COATED ORAL at 21:08

## 2021-08-29 RX ADMIN — ALBUTEROL 2 PUFF(S): 90 AEROSOL, METERED ORAL at 20:14

## 2021-08-29 RX ADMIN — ALBUTEROL 2 PUFF(S): 90 AEROSOL, METERED ORAL at 14:12

## 2021-08-29 RX ADMIN — LACOSAMIDE 140 MILLIGRAM(S): 50 TABLET ORAL at 19:41

## 2021-08-29 RX ADMIN — Medication 3 MILLILITER(S): at 08:40

## 2021-08-29 RX ADMIN — Medication 81 MILLIGRAM(S): at 12:17

## 2021-08-29 RX ADMIN — Medication 2 MILLIGRAM(S): at 05:47

## 2021-08-29 RX ADMIN — Medication 4: at 05:47

## 2021-08-29 RX ADMIN — Medication 3 MILLILITER(S): at 20:11

## 2021-08-29 RX ADMIN — HUMAN INSULIN 26 UNIT(S): 100 INJECTION, SUSPENSION SUBCUTANEOUS at 18:57

## 2021-08-29 RX ADMIN — ALBUTEROL 2 PUFF(S): 90 AEROSOL, METERED ORAL at 08:40

## 2021-08-29 RX ADMIN — HUMAN INSULIN 23 UNIT(S): 100 INJECTION, SUSPENSION SUBCUTANEOUS at 00:29

## 2021-08-29 RX ADMIN — ALBUTEROL 2 PUFF(S): 90 AEROSOL, METERED ORAL at 02:12

## 2021-08-29 RX ADMIN — SENNA PLUS 2 TABLET(S): 8.6 TABLET ORAL at 21:08

## 2021-08-29 RX ADMIN — PANTOPRAZOLE SODIUM 40 MILLIGRAM(S): 20 TABLET, DELAYED RELEASE ORAL at 12:17

## 2021-08-29 NOTE — PROGRESS NOTE ADULT - ASSESSMENT
Assessment: 71-year-old diabetic, admitted with HHS/DKA s/p clinical tonic-clonic seizure x2 qualifying for status epilepticus on keppra 3g/d and versed gtt, course c/b respiratory failure, distributive shock, encephalopathy, metabolic derangements, currently remains mechanically ventilated.    status epilepticus  HHS/DKA  acute encephalopathy  acute respiratory failure      Plan:  Neurological:  - Neuro Checks Q2H  - Repeat CTH overnight- stable  - Continue vEEG, as versed gtt washes out of system, c/w keppra 3/d  - Continue Thiamine 500 IV   - Daily Statin  - Continue ASA 81 mg daily  - PT/OT, splints on all four  - MR with small acute L occipital infarct, prior vessel imaging stable  - Precedex/Fentanyl IV prn for agitation    Cardiovascular:  - off pressors, MAP >65  - TTE, 8/24, with normal BiV function  - ASA, statin    Pulmonary:  - Vent support, lung protective settings; PSV trials   - Etco2  - HOB 45  - Recommend Plateau pressure < 30 to maintain venous drainage  - CXR reviewed  - Pulmonary toilet- Albuterol/Mucomyst    GI/:  - Diet: Glucerna 1.2 continuous   - Bowel Regimen: senna/miralax/mag citrate   - Strict Is/Os  - Basilio placed this morning keep for 24 hour and then continue seriel straight cath   - Continue Basilio catheter  - PPI daily  - Last BM: 8/28    Electrolytes:  - Replete as needed  - Keep normoglycemia  - NPH q6hrs w/ corrective sliding scale  -hyperK s/p lokelma, repeat bmp    Endo:  -nph q6, correction ISS, goal FSBS 140-180  a1c >15.5    ID:  - Trend Fever curve and WBC- afebrile  - Completed course of Rocephin  -pct 0.1    Hematology:  - SCDs and SQ Lovenox    FLUIDS/ELECTROLYTES/NUTRITION  -IVF: No needs  -Monitor, Replete to K>4 and Mg>2  -Diet: EN  PROPHYLAXIS  -DVT: SQ  -GI: PPI      Code Status: Full    Dispo: ICU             Assessment: 71-year-old diabetic, admitted with HHS/DKA s/p clinical tonic-clonic seizure x2 qualifying for status epilepticus on keppra 3g/d and versed gtt, course c/b respiratory failure, distributive shock, encephalopathy, metabolic derangements, currently remains mechanically ventilated.    status epilepticus  HHS/DKA  acute encephalopathy  acute respiratory failure      Plan:  Neurological:  - Neuro Checks Q4H  - Continue vEEG  - Continue Keppra   - Continue Thiamine 500 IV   - Daily Statin  - Continue ASA 81 mg daily  - PT/OT, splints on all four  - MR with small acute L occipital infarct, prior vessel imaging stable  - Precedex/Fentanyl IV prn for agitation    Cardiovascular:  - off pressors, MAP >65  - TTE, 8/24, with normal BiV function  - ASA, statin  - propranolol 10 mg every 8 hrs for episodic sympathetic surges with stimulation   - home dose of ACE not restarted due to mild hyperkalemia     Pulmonary:  - Vent support, lung protective settings; PSV trials   - Etco2  - HOB 45  - Recommend Plateau pressure < 30 to maintain venous drainage  - CXR reviewed  - Pulmonary toilet- Albuterol/Mucomyst    GI/:  - Diet: Glucerna 1.2 continuous   - Bowel Regimen: senna/miralax  - Strict Is/Os  - Basilio placed this morning keep for 24 hour and then continue seriel straight cath   - Cardura started   - PPI daily  - Last BM: 8/29    Electrolytes:  - Replete as needed  - Keep normoglycemia  - hyperK s/p lokelma    Endo:  - a1c >15.5  - NPH 26 units q6hrs w/ corrective sliding scale q 6  - FS q 6   - glucose goal at 140-180     ID:  - Trend Fever curve and WBC- afebrile  - Completed course of Rocephin  - pct 0.1  - monitor of AB     Hematology:  - SCDs and SQ Lovenox      Code Status: Full    Dispo: ICU             Assessment: 71-year-old diabetic, admitted with HHS/DKA s/p clinical tonic-clonic seizure x2 qualifying for status epilepticus on keppra 3g/d and versed gtt, course c/b respiratory failure, distributive shock, encephalopathy, metabolic derangements, currently remains mechanically ventilated.    status epilepticus  HHS/DKA  acute encephalopathy  acute respiratory failure      Plan:  Neurological:  - Neuro Checks Q4H  - Continue vEEG with stimulus induced triphasic morphology  - Continue Keppra   - Continue Thiamine 500 IV completed  - Daily Statin  - Continue ASA 81 mg daily  - PT/OT, splints on all four  - MR with small acute L occipital infarct, prior vessel imaging stable  - Precedex/Fentanyl IV prn for agitation    Cardiovascular:  - off pressors, MAP >65  - TTE, 8/24, with normal BiV function  - ASA, statin  - propranolol 10 mg every 8 hrs for episodic sympathetic surges with stimulation   - home dose of ACE not restarted due to mild hyperkalemia     Pulmonary:  - Vent support, lung protective settings; PSV trials; will broach trach with family this week pending pt clinical status  - Etco2  - HOB 45  - Recommend Plateau pressure < 30 to maintain venous drainage  - CXR reviewed  - Pulmonary toilet- Albuterol/Mucomyst    GI/:  - Diet: Glucerna 1.2 continuous   - Bowel Regimen: senna/miralax  - Strict Is/Os  - Basilio placed this morning keep for 24 hour and then continue serial straight cath   - Cardura started for urinary retention  - PPI daily  - Last BM: 8/29    Electrolytes:  - Replete as needed  - Keep normoglycemia  - hyperK s/p lokelma    Endo:  - a1c >15.5  - NPH units q6hrs w/ corrective sliding scale q 6  - FS q 6   - glucose goal at 140-180     ID:  - Trend Fever curve and WBC- afebrile  - Completed course of Rocephin  - pct 0.1  - monitor of AB     Hematology:  - SCDs and SQ Lovenox      Code Status: Full    Dispo: ICU

## 2021-08-29 NOTE — PROGRESS NOTE ADULT - SUBJECTIVE AND OBJECTIVE BOX
SUMMARY:    OVERNIGHT EVENTS:       REVIEW OF SYSTEMS:    VITALS: [x] Reviewed    IMAGING/DATA: [x] Reviewed    IVF FLUIDS/MEDICATIONS: [x] Reviewed    ALLERGIES: No Known Allergies      DEVICES:   [x] Restraints [x] PIVs [] ET tube [x] central line [] PICC [x] arterial line [] lubin [x] NGT/OGT [] EVD [] LD [] EVANGELISTA/HMV [] LiCOX [] ICP monitor [] Trach [] PEG [] Chest Tube [] other:    EXAMINATION:  General: No acute distress  HEENT: Anicteric sclerae  Cardiac: [id number]  Lungs: Clear, intubated   Abdomen: Soft, non-tender, +BS  Extremities: No c/c/e  Neurologic: Mechanically vented, no sedation. Does not open eyes to voice or noxious stimuli. Does not track to voice. Does not follow simple or complex commands  Cranial Nerves: Pupils left 4mm sluggish, right 2mm brisk. No gaze preference or nystagmus noted. (+) corneal reflex bilaterally (+) oculocephalic reflex (+) cough reflex (+) gag reflex. Face grossly symmetrical with no loss of nasolabial folds.   Motor: No movement to noxious stimuli bilaterally throughout.   Sensation: Facial grimacing noted when oral suctioning       ICU Vital Signs Last 24 Hrs  T(C): 37.2 (29 Aug 2021 08:00), Max: 38.1 (28 Aug 2021 17:39)  T(F): 99 (29 Aug 2021 08:00), Max: 100.6 (28 Aug 2021 17:39)  HR: 72 (29 Aug 2021 10:00) (66 - 96)  BP: --  BP(mean): --  ABP: 164/58 (29 Aug 2021 10:00) (108/46 - 192/64)  ABP(mean): 94 (29 Aug 2021 10:00) (46 - 106)  RR: 20 (29 Aug 2021 10:00) (16 - 30)  SpO2: 100% (29 Aug 2021 10:00) (98% - 100%)      08-28-21 @ 07:01  -  08-29-21 @ 07:00  --------------------------------------------------------  IN: 2412.6 mL / OUT: 1375 mL / NET: 1037.6 mL    08-29-21 @ 07:01  -  08-29-21 @ 10:57  --------------------------------------------------------  IN: 324 mL / OUT: 175 mL / NET: 149 mL        Mode: AC/ CMV (Assist Control/ Continuous Mandatory Ventilation), RR (machine): 16, TV (machine): 420, FiO2: 30, PEEP: 5    acetaminophen   Tablet .. 650 milliGRAM(s) Oral every 6 hours PRN  acetylcysteine 10%  Inhalation 3 milliLiter(s) Inhalation every 6 hours  ALBUTerol    90 MICROgram(s) HFA Inhaler 2 Puff(s) Inhalation every 6 hours  aspirin  chewable 81 milliGRAM(s) Oral daily  atorvastatin 20 milliGRAM(s) Oral at bedtime  chlorhexidine 0.12% Liquid 15 milliLiter(s) Oral Mucosa every 12 hours  chlorhexidine 4% Liquid 1 Application(s) Topical <User Schedule>  dexMEDEtomidine Infusion 0.2 MICROgram(s)/kG/Hr (4.41 mL/Hr) IV Continuous <Continuous>  dextrose 5%. 1000 milliLiter(s) (100 mL/Hr) IV Continuous <Continuous>  doxazosin 2 milliGRAM(s) Oral at bedtime  enoxaparin Injectable 40 milliGRAM(s) SubCutaneous daily  fentaNYL    Injectable 25 MICROGram(s) IV Push every 2 hours PRN  glucagon  Injectable 1 milliGRAM(s) IntraMuscular once  insulin lispro (ADMELOG) corrective regimen sliding scale   SubCutaneous every 4 hours  insulin NPH human recombinant 26 Unit(s) SubCutaneous every 6 hours  levETIRAcetam  IVPB 1000 milliGRAM(s) IV Intermittent <User Schedule>  pantoprazole   Suspension 40 milliGRAM(s) Oral daily  senna 2 Tablet(s) Oral at bedtime  thiamine IVPB 500 milliGRAM(s) IV Intermittent every 8 hours      LABS:  Na: 139 (08-29 @ 03:24), 143 (08-28 @ 14:10), 145 (08-28 @ 09:02), 144 (08-28 @ 03:18), 141 (08-27 @ 04:30), 143 (08-26 @ 11:10)  K: 4.9 (08-29 @ 03:24), 4.9 (08-28 @ 14:10), 4.8 (08-28 @ 09:02), 5.4 (08-28 @ 03:18), 5.1 (08-27 @ 04:30), 4.5 (08-26 @ 11:10)  Cl: 104 (08-29 @ 03:24), 108 (08-28 @ 14:10), 109 (08-28 @ 09:02), 108 (08-28 @ 03:18), 107 (08-27 @ 04:30), 109 (08-26 @ 11:10)  CO2: 27 (08-29 @ 03:24), 27 (08-28 @ 14:10), 26 (08-28 @ 09:02), 27 (08-28 @ 03:18), 25 (08-27 @ 04:30), 26 (08-26 @ 11:10)  BUN: 28 (08-29 @ 03:24), 21 (08-28 @ 14:10), 22 (08-28 @ 09:02), 20 (08-28 @ 03:18), 17 (08-27 @ 04:30), 16 (08-26 @ 11:10)  Cr: 0.9 (08-29 @ 03:24), 1.0 (08-28 @ 14:10), 1.0 (08-28 @ 09:02), 1.0 (08-28 @ 03:18), 1.1 (08-27 @ 04:30), 1.1 (08-26 @ 11:10)  Glu: 242(08-29 @ 03:24), 223(08-28 @ 14:10), 222(08-28 @ 09:02), 295(08-28 @ 03:18), 352(08-27 @ 04:30), 334(08-26 @ 11:10)    Hgb: 10.1 (08-29 @ 03:24), 10.6 (08-28 @ 03:18), 10.7 (08-27 @ 04:30)  Hct: 32.3 (08-29 @ 03:24), 32.9 (08-28 @ 03:18), 33.1 (08-27 @ 04:30)  WBC: 9.01 (08-29 @ 03:24), 7.22 (08-28 @ 03:18), 7.97 (08-27 @ 04:30)  Plt: 160 (08-29 @ 03:24), 143 (08-28 @ 03:18), 123 (08-27 @ 04:30)    LIVER FUNCTIONS - ( 29 Aug 2021 03:24 )  Alb: 2.7 g/dL / Pro: 4.9 g/dL / ALK PHOS: 123 U/L / ALT: 39 U/L / AST: 43 U/L / GGT: x           ABG - ( 29 Aug 2021 03:01 )  pH, Arterial: 7.41  pH, Blood: x     /  pCO2: 47    /  pO2: 111   / HCO3: 30    / Base Excess: 4.3   /  SaO2: 98.9                 SUMMARY: 70 y/o M with pmh of dm, osteomyelitis, gerd, depression, hld, htn presenting to the ED for above cc.   the patient was doing last night until he was unable to talk and walk independently. As per his ex wife the right sided then started shaking. She called [phone].   On presentation the patient had status epilepticus and he received 4 mg of lorazepam.   CT brain done and an acute stroke could not be excluded because of technical difficulties.  His blood glucose was found to be more than 600.   he is admitted for [facility]    the ex wife said that he has bipolar and he was on a medication, although this is not found in the chart. She also does not recall his meds  sp intubation for airway protection, on propofol, INSULI, 1/2 ns 150 cc/h (24 Aug 2021 04:34)      OVERNIGHT EVENTS: tolerated psv for 2 hours yesterday, low grade temp,       REVIEW OF SYSTEMS:    VITALS: [x] Reviewed    IMAGING/DATA: [x] Reviewed    IVF FLUIDS/MEDICATIONS: [x] Reviewed    ALLERGIES: No Known Allergies      DEVICES:   [x] Restraints [x] PIVs [] ET tube [x] central line [] PICC [x] arterial line [] lubin [x] NGT/OGT [] EVD [] LD [] EVANGELISTA/HMV [] LiCOX [] ICP monitor [] Trach [] PEG [] Chest Tube [] other:    EXAMINATION:  General: No acute distress  HEENT: Anicteric sclerae  Cardiac: [id number]  Lungs: Clear, intubated   Abdomen: Soft, non-tender, +BS  Extremities: No c/c/e  Neurologic: Mechanically vented, no sedation. Does not open eyes to voice or noxious stimuli. Does not track to voice. Does not follow simple or complex commands  Cranial Nerves: Pupils left 4mm sluggish, right 2mm brisk. No gaze preference or nystagmus noted. (+) corneal reflex bilaterally (+) oculocephalic reflex (+) cough reflex (+) gag reflex. Face grossly symmetrical with no loss of nasolabial folds.   Motor: No movement to noxious stimuli bilaterally throughout.   Sensation: Facial grimacing noted when oral suctioning       ICU Vital Signs Last 24 Hrs  T(C): 37.2 (29 Aug 2021 08:00), Max: 38.1 (28 Aug 2021 17:39)  T(F): 99 (29 Aug 2021 08:00), Max: 100.6 (28 Aug 2021 17:39)  HR: 72 (29 Aug 2021 10:00) (66 - 96)  BP: --  BP(mean): --  ABP: 164/58 (29 Aug 2021 10:00) (108/46 - 192/64)  ABP(mean): 94 (29 Aug 2021 10:00) (46 - 106)  RR: 20 (29 Aug 2021 10:00) (16 - 30)  SpO2: 100% (29 Aug 2021 10:00) (98% - 100%)      08-28-21 @ 07:01  -  08-29-21 @ 07:00  --------------------------------------------------------  IN: 2412.6 mL / OUT: 1375 mL / NET: 1037.6 mL    08-29-21 @ 07:01  -  08-29-21 @ 10:57  --------------------------------------------------------  IN: 324 mL / OUT: 175 mL / NET: 149 mL        Mode: AC/ CMV (Assist Control/ Continuous Mandatory Ventilation), RR (machine): 16, TV (machine): 420, FiO2: 30, PEEP: 5    acetaminophen   Tablet .. 650 milliGRAM(s) Oral every 6 hours PRN  acetylcysteine 10%  Inhalation 3 milliLiter(s) Inhalation every 6 hours  ALBUTerol    90 MICROgram(s) HFA Inhaler 2 Puff(s) Inhalation every 6 hours  aspirin  chewable 81 milliGRAM(s) Oral daily  atorvastatin 20 milliGRAM(s) Oral at bedtime  chlorhexidine 0.12% Liquid 15 milliLiter(s) Oral Mucosa every 12 hours  chlorhexidine 4% Liquid 1 Application(s) Topical <User Schedule>  dexMEDEtomidine Infusion 0.2 MICROgram(s)/kG/Hr (4.41 mL/Hr) IV Continuous <Continuous>  dextrose 5%. 1000 milliLiter(s) (100 mL/Hr) IV Continuous <Continuous>  doxazosin 2 milliGRAM(s) Oral at bedtime  enoxaparin Injectable 40 milliGRAM(s) SubCutaneous daily  fentaNYL    Injectable 25 MICROGram(s) IV Push every 2 hours PRN  glucagon  Injectable 1 milliGRAM(s) IntraMuscular once  insulin lispro (ADMELOG) corrective regimen sliding scale   SubCutaneous every 4 hours  insulin NPH human recombinant 26 Unit(s) SubCutaneous every 6 hours  levETIRAcetam  IVPB 1000 milliGRAM(s) IV Intermittent <User Schedule>  pantoprazole   Suspension 40 milliGRAM(s) Oral daily  senna 2 Tablet(s) Oral at bedtime  thiamine IVPB 500 milliGRAM(s) IV Intermittent every 8 hours      LABS:  Na: 139 (08-29 @ 03:24), 143 (08-28 @ 14:10), 145 (08-28 @ 09:02), 144 (08-28 @ 03:18), 141 (08-27 @ 04:30), 143 (08-26 @ 11:10)  K: 4.9 (08-29 @ 03:24), 4.9 (08-28 @ 14:10), 4.8 (08-28 @ 09:02), 5.4 (08-28 @ 03:18), 5.1 (08-27 @ 04:30), 4.5 (08-26 @ 11:10)  Cl: 104 (08-29 @ 03:24), 108 (08-28 @ 14:10), 109 (08-28 @ 09:02), 108 (08-28 @ 03:18), 107 (08-27 @ 04:30), 109 (08-26 @ 11:10)  CO2: 27 (08-29 @ 03:24), 27 (08-28 @ 14:10), 26 (08-28 @ 09:02), 27 (08-28 @ 03:18), 25 (08-27 @ 04:30), 26 (08-26 @ 11:10)  BUN: 28 (08-29 @ 03:24), 21 (08-28 @ 14:10), 22 (08-28 @ 09:02), 20 (08-28 @ 03:18), 17 (08-27 @ 04:30), 16 (08-26 @ 11:10)  Cr: 0.9 (08-29 @ 03:24), 1.0 (08-28 @ 14:10), 1.0 (08-28 @ 09:02), 1.0 (08-28 @ 03:18), 1.1 (08-27 @ 04:30), 1.1 (08-26 @ 11:10)  Glu: 242(08-29 @ 03:24), 223(08-28 @ 14:10), 222(08-28 @ 09:02), 295(08-28 @ 03:18), 352(08-27 @ 04:30), 334(08-26 @ 11:10)    Hgb: 10.1 (08-29 @ 03:24), 10.6 (08-28 @ 03:18), 10.7 (08-27 @ 04:30)  Hct: 32.3 (08-29 @ 03:24), 32.9 (08-28 @ 03:18), 33.1 (08-27 @ 04:30)  WBC: 9.01 (08-29 @ 03:24), 7.22 (08-28 @ 03:18), 7.97 (08-27 @ 04:30)  Plt: 160 (08-29 @ 03:24), 143 (08-28 @ 03:18), 123 (08-27 @ 04:30)    LIVER FUNCTIONS - ( 29 Aug 2021 03:24 )  Alb: 2.7 g/dL / Pro: 4.9 g/dL / ALK PHOS: 123 U/L / ALT: 39 U/L / AST: 43 U/L / GGT: x           ABG - ( 29 Aug 2021 03:01 )  pH, Arterial: 7.41  pH, Blood: x     /  pCO2: 47    /  pO2: 111   / HCO3: 30    / Base Excess: 4.3   /  SaO2: 98.9

## 2021-08-29 NOTE — EEG REPORT - NS EEG TEXT BOX
Epilepsy Attending Note:     JOSÉ MANUEL PORTER    71y Male  MRN [id number]    Vital Signs Last 24 Hrs  T(C): 37.2 (29 Aug 2021 08:00), Max: 38.1 (28 Aug 2021 17:39)  T(F): 99 (29 Aug 2021 08:00), Max: 100.6 (28 Aug 2021 17:39)  HR: 66 (29 Aug 2021 09:00) (66 - 96)  BP: --  BP(mean): --  RR: 17 (29 Aug 2021 09:00) (16 - 30)  SpO2: 100% (29 Aug 2021 09:00) (98% - 100%)                          10.1   9.01  )-----------( 160      ( 29 Aug 2021 03:24 )             32.3       08-29    139  |  104  |  28<H>  ----------------------------<  242<H>  4.9   |  27  |  0.9    Ca    8.8      29 Aug 2021 03:24  Phos  4.6     08-29  Mg     2.5     08-29    TPro  4.9<L>  /  Alb  2.7<L>  /  TBili  0.2  /  DBili  x   /  AST  43<H>  /  ALT  39  /  AlkPhos  123<H>  08-29      MEDICATIONS  (STANDING):  acetylcysteine 10%  Inhalation 3 milliLiter(s) Inhalation every 6 hours  ALBUTerol    90 MICROgram(s) HFA Inhaler 2 Puff(s) Inhalation every 6 hours  aspirin  chewable 81 milliGRAM(s) Oral daily  atorvastatin 20 milliGRAM(s) Oral at bedtime  chlorhexidine 0.12% Liquid 15 milliLiter(s) Oral Mucosa every 12 hours  chlorhexidine 4% Liquid 1 Application(s) Topical <User Schedule>  dexMEDEtomidine Infusion 0.2 MICROgram(s)/kG/Hr (4.41 mL/Hr) IV Continuous <Continuous>  dextrose 5%. 1000 milliLiter(s) (100 mL/Hr) IV Continuous <Continuous>  doxazosin 2 milliGRAM(s) Oral at bedtime  enoxaparin Injectable 40 milliGRAM(s) SubCutaneous daily  glucagon  Injectable 1 milliGRAM(s) IntraMuscular once  insulin lispro (ADMELOG) corrective regimen sliding scale   SubCutaneous every 4 hours  insulin NPH human recombinant 26 Unit(s) SubCutaneous every 6 hours  levETIRAcetam  IVPB 1000 milliGRAM(s) IV Intermittent <User Schedule>  pantoprazole   Suspension 40 milliGRAM(s) Oral daily  senna 2 Tablet(s) Oral at bedtime  thiamine IVPB 500 milliGRAM(s) IV Intermittent every 8 hours    MEDICATIONS  (PRN):  acetaminophen   Tablet .. 650 milliGRAM(s) Oral every 6 hours PRN Temp greater or equal to 38C (100.4F)  fentaNYL    Injectable 25 MICROGram(s) IV Push every 2 hours PRN Agitation, vent dyssynchrony            VEEG in the last 24 hours:  intubated     Background----------- low amplitude  , showing reactivity     Focal and generalized slowing--------  moderate generalized and mild to moderate bifrontal slowing    Interictal activity----------   the recording  shows rare isolated   more  bifrontal/ FP  and few stimulus dependent   runs of periodic  < 2.5 hz generalized triphasic sharps. This activity is not clearly epileptiform in nature     Events-----  stimulus dependent activity as above    Seizures------none    Impression:  abnormal as above. The recording is C/W :   Encephalopathy  . The II activity as described above could be suggestive of an irritable focus     Plan - as/NCC team

## 2021-08-30 LAB
ALBUMIN SERPL ELPH-MCNC: 2.7 G/DL — LOW (ref 3.5–5.2)
ALP SERPL-CCNC: 164 U/L — HIGH (ref 30–115)
ALT FLD-CCNC: 74 U/L — HIGH (ref 0–41)
ANION GAP SERPL CALC-SCNC: 13 MMOL/L — SIGNIFICANT CHANGE UP (ref 7–14)
ANION GAP SERPL CALC-SCNC: 9 MMOL/L — SIGNIFICANT CHANGE UP (ref 7–14)
AST SERPL-CCNC: 81 U/L — HIGH (ref 0–41)
BASE EXCESS BLDA CALC-SCNC: 6.4 MMOL/L — HIGH (ref -2–3)
BASE EXCESS BLDA CALC-SCNC: 7.8 MMOL/L — HIGH (ref -2–3)
BILIRUB SERPL-MCNC: <0.2 MG/DL — SIGNIFICANT CHANGE UP (ref 0.2–1.2)
BUN SERPL-MCNC: 29 MG/DL — HIGH (ref 10–20)
BUN SERPL-MCNC: 31 MG/DL — HIGH (ref 10–20)
CALCIUM SERPL-MCNC: 8.8 MG/DL — SIGNIFICANT CHANGE UP (ref 8.5–10.1)
CALCIUM SERPL-MCNC: 9 MG/DL — SIGNIFICANT CHANGE UP (ref 8.5–10.1)
CHLORIDE SERPL-SCNC: 102 MMOL/L — SIGNIFICANT CHANGE UP (ref 98–110)
CHLORIDE SERPL-SCNC: 105 MMOL/L — SIGNIFICANT CHANGE UP (ref 98–110)
CO2 SERPL-SCNC: 27 MMOL/L — SIGNIFICANT CHANGE UP (ref 17–32)
CO2 SERPL-SCNC: 29 MMOL/L — SIGNIFICANT CHANGE UP (ref 17–32)
CREAT ?TM UR-MCNC: 52 MG/DL — SIGNIFICANT CHANGE UP
CREAT SERPL-MCNC: 0.9 MG/DL — SIGNIFICANT CHANGE UP (ref 0.7–1.5)
CREAT SERPL-MCNC: 0.9 MG/DL — SIGNIFICANT CHANGE UP (ref 0.7–1.5)
GAS PNL BLDA: SIGNIFICANT CHANGE UP
GAS PNL BLDA: SIGNIFICANT CHANGE UP
GLUCOSE BLDC GLUCOMTR-MCNC: 228 MG/DL — HIGH (ref 70–99)
GLUCOSE BLDC GLUCOMTR-MCNC: 234 MG/DL — HIGH (ref 70–99)
GLUCOSE BLDC GLUCOMTR-MCNC: 234 MG/DL — HIGH (ref 70–99)
GLUCOSE BLDC GLUCOMTR-MCNC: 236 MG/DL — HIGH (ref 70–99)
GLUCOSE BLDC GLUCOMTR-MCNC: 238 MG/DL — HIGH (ref 70–99)
GLUCOSE SERPL-MCNC: 213 MG/DL — HIGH (ref 70–99)
GLUCOSE SERPL-MCNC: 244 MG/DL — HIGH (ref 70–99)
HCO3 BLDA-SCNC: 32 MMOL/L — HIGH (ref 21–28)
HCO3 BLDA-SCNC: 32 MMOL/L — HIGH (ref 21–28)
HCT VFR BLD CALC: 31.1 % — LOW (ref 42–52)
HGB BLD-MCNC: 10 G/DL — LOW (ref 14–18)
HOROWITZ INDEX BLDA+IHG-RTO: 30 — SIGNIFICANT CHANGE UP
HOROWITZ INDEX BLDA+IHG-RTO: 30 — SIGNIFICANT CHANGE UP
MAGNESIUM SERPL-MCNC: 2.3 MG/DL — SIGNIFICANT CHANGE UP (ref 1.8–2.4)
MAGNESIUM UR-MCNC: 17.8 MG/DL — SIGNIFICANT CHANGE UP
MCHC RBC-ENTMCNC: 29.3 PG — SIGNIFICANT CHANGE UP (ref 27–31)
MCHC RBC-ENTMCNC: 32.2 G/DL — SIGNIFICANT CHANGE UP (ref 32–37)
MCV RBC AUTO: 91.2 FL — SIGNIFICANT CHANGE UP (ref 80–94)
NRBC # BLD: 0 /100 WBCS — SIGNIFICANT CHANGE UP (ref 0–0)
PCO2 BLDA: 42 MMHG — SIGNIFICANT CHANGE UP (ref 35–48)
PCO2 BLDA: 48 MMHG — SIGNIFICANT CHANGE UP (ref 35–48)
PH BLDA: 7.43 — SIGNIFICANT CHANGE UP (ref 7.35–7.45)
PH BLDA: 7.49 — HIGH (ref 7.35–7.45)
PHOSPHATE SERPL-MCNC: 3.9 MG/DL — SIGNIFICANT CHANGE UP (ref 2.1–4.9)
PHOSPHATE SERPL-MCNC: 4 MG/DL — SIGNIFICANT CHANGE UP (ref 2.1–4.9)
PLATELET # BLD AUTO: 162 K/UL — SIGNIFICANT CHANGE UP (ref 130–400)
PO2 BLDA: 69 MMHG — LOW (ref 83–108)
PO2 BLDA: 79 MMHG — LOW (ref 83–108)
POTASSIUM SERPL-MCNC: 4.9 MMOL/L — SIGNIFICANT CHANGE UP (ref 3.5–5)
POTASSIUM SERPL-MCNC: 5 MMOL/L — SIGNIFICANT CHANGE UP (ref 3.5–5)
POTASSIUM SERPL-SCNC: 4.9 MMOL/L — SIGNIFICANT CHANGE UP (ref 3.5–5)
POTASSIUM SERPL-SCNC: 5 MMOL/L — SIGNIFICANT CHANGE UP (ref 3.5–5)
PROT SERPL-MCNC: 5 G/DL — LOW (ref 6–8)
RBC # BLD: 3.41 M/UL — LOW (ref 4.7–6.1)
RBC # FLD: 13.8 % — SIGNIFICANT CHANGE UP (ref 11.5–14.5)
SAO2 % BLDA: 96.1 % — SIGNIFICANT CHANGE UP (ref 94–98)
SAO2 % BLDA: 97.3 % — SIGNIFICANT CHANGE UP (ref 94–98)
SODIUM SERPL-SCNC: 142 MMOL/L — SIGNIFICANT CHANGE UP (ref 135–146)
SODIUM SERPL-SCNC: 143 MMOL/L — SIGNIFICANT CHANGE UP (ref 135–146)
SODIUM UR-SCNC: 154 MMOL/L — SIGNIFICANT CHANGE UP
WBC # BLD: 9.21 K/UL — SIGNIFICANT CHANGE UP (ref 4.8–10.8)
WBC # FLD AUTO: 9.21 K/UL — SIGNIFICANT CHANGE UP (ref 4.8–10.8)

## 2021-08-30 PROCEDURE — 99291 CRITICAL CARE FIRST HOUR: CPT

## 2021-08-30 RX ORDER — NOREPINEPHRINE BITARTRATE/D5W 8 MG/250ML
0.05 PLASTIC BAG, INJECTION (ML) INTRAVENOUS
Qty: 8 | Refills: 0 | Status: DISCONTINUED | OUTPATIENT
Start: 2021-08-30 | End: 2021-08-31

## 2021-08-30 RX ORDER — HYDRALAZINE HCL 50 MG
100 TABLET ORAL EVERY 8 HOURS
Refills: 0 | Status: DISCONTINUED | OUTPATIENT
Start: 2021-08-30 | End: 2021-08-31

## 2021-08-30 RX ORDER — LABETALOL HCL 100 MG
10 TABLET ORAL EVERY 6 HOURS
Refills: 0 | Status: DISCONTINUED | OUTPATIENT
Start: 2021-08-30 | End: 2021-08-31

## 2021-08-30 RX ORDER — PANTOPRAZOLE SODIUM 20 MG/1
40 TABLET, DELAYED RELEASE ORAL DAILY
Refills: 0 | Status: DISCONTINUED | OUTPATIENT
Start: 2021-08-31 | End: 2021-09-09

## 2021-08-30 RX ORDER — PANTOPRAZOLE SODIUM 20 MG/1
40 TABLET, DELAYED RELEASE ORAL
Refills: 0 | Status: DISCONTINUED | OUTPATIENT
Start: 2021-08-30 | End: 2021-08-30

## 2021-08-30 RX ORDER — HUMAN INSULIN 100 [IU]/ML
33 INJECTION, SUSPENSION SUBCUTANEOUS EVERY 6 HOURS
Refills: 0 | Status: DISCONTINUED | OUTPATIENT
Start: 2021-08-30 | End: 2021-08-31

## 2021-08-30 RX ORDER — LABETALOL HCL 100 MG
10 TABLET ORAL ONCE
Refills: 0 | Status: COMPLETED | OUTPATIENT
Start: 2021-08-30 | End: 2021-08-30

## 2021-08-30 RX ADMIN — ALBUTEROL 2 PUFF(S): 90 AEROSOL, METERED ORAL at 02:58

## 2021-08-30 RX ADMIN — HUMAN INSULIN 30 UNIT(S): 100 INJECTION, SUSPENSION SUBCUTANEOUS at 23:31

## 2021-08-30 RX ADMIN — CHLORHEXIDINE GLUCONATE 1 APPLICATION(S): 213 SOLUTION TOPICAL at 05:30

## 2021-08-30 RX ADMIN — Medication 8.27 MICROGRAM(S)/KG/MIN: at 22:33

## 2021-08-30 RX ADMIN — ATORVASTATIN CALCIUM 20 MILLIGRAM(S): 80 TABLET, FILM COATED ORAL at 21:27

## 2021-08-30 RX ADMIN — INSULIN HUMAN 4: 100 INJECTION, SOLUTION SUBCUTANEOUS at 23:31

## 2021-08-30 RX ADMIN — Medication 100 MILLIGRAM(S): at 13:47

## 2021-08-30 RX ADMIN — HUMAN INSULIN 30 UNIT(S): 100 INJECTION, SUSPENSION SUBCUTANEOUS at 11:37

## 2021-08-30 RX ADMIN — INSULIN HUMAN 4: 100 INJECTION, SOLUTION SUBCUTANEOUS at 05:30

## 2021-08-30 RX ADMIN — LEVETIRACETAM 400 MILLIGRAM(S): 250 TABLET, FILM COATED ORAL at 13:55

## 2021-08-30 RX ADMIN — Medication 650 MILLIGRAM(S): at 18:21

## 2021-08-30 RX ADMIN — ENOXAPARIN SODIUM 40 MILLIGRAM(S): 100 INJECTION SUBCUTANEOUS at 13:14

## 2021-08-30 RX ADMIN — INSULIN HUMAN 4: 100 INJECTION, SOLUTION SUBCUTANEOUS at 00:45

## 2021-08-30 RX ADMIN — Medication 81 MILLIGRAM(S): at 13:15

## 2021-08-30 RX ADMIN — Medication 10 MILLIGRAM(S): at 13:48

## 2021-08-30 RX ADMIN — Medication 3 MILLILITER(S): at 02:58

## 2021-08-30 RX ADMIN — LEVETIRACETAM 400 MILLIGRAM(S): 250 TABLET, FILM COATED ORAL at 21:27

## 2021-08-30 RX ADMIN — ALBUTEROL 2 PUFF(S): 90 AEROSOL, METERED ORAL at 19:56

## 2021-08-30 RX ADMIN — HUMAN INSULIN 30 UNIT(S): 100 INJECTION, SUSPENSION SUBCUTANEOUS at 00:44

## 2021-08-30 RX ADMIN — HUMAN INSULIN 30 UNIT(S): 100 INJECTION, SUSPENSION SUBCUTANEOUS at 05:30

## 2021-08-30 RX ADMIN — SENNA PLUS 2 TABLET(S): 8.6 TABLET ORAL at 21:27

## 2021-08-30 RX ADMIN — Medication 650 MILLIGRAM(S): at 18:00

## 2021-08-30 RX ADMIN — ALBUTEROL 2 PUFF(S): 90 AEROSOL, METERED ORAL at 14:26

## 2021-08-30 RX ADMIN — CHLORHEXIDINE GLUCONATE 15 MILLILITER(S): 213 SOLUTION TOPICAL at 05:31

## 2021-08-30 RX ADMIN — Medication 2 MILLIGRAM(S): at 21:27

## 2021-08-30 RX ADMIN — CHLORHEXIDINE GLUCONATE 15 MILLILITER(S): 213 SOLUTION TOPICAL at 17:09

## 2021-08-30 RX ADMIN — DEXMEDETOMIDINE HYDROCHLORIDE IN 0.9% SODIUM CHLORIDE 4.41 MICROGRAM(S)/KG/HR: 4 INJECTION INTRAVENOUS at 17:36

## 2021-08-30 RX ADMIN — ALBUTEROL 2 PUFF(S): 90 AEROSOL, METERED ORAL at 07:37

## 2021-08-30 RX ADMIN — PANTOPRAZOLE SODIUM 40 MILLIGRAM(S): 20 TABLET, DELAYED RELEASE ORAL at 13:14

## 2021-08-30 RX ADMIN — LACOSAMIDE 120 MILLIGRAM(S): 50 TABLET ORAL at 17:36

## 2021-08-30 RX ADMIN — INSULIN HUMAN 4: 100 INJECTION, SOLUTION SUBCUTANEOUS at 11:37

## 2021-08-30 RX ADMIN — Medication 3 MILLILITER(S): at 07:36

## 2021-08-30 RX ADMIN — INSULIN HUMAN 4: 100 INJECTION, SOLUTION SUBCUTANEOUS at 17:09

## 2021-08-30 RX ADMIN — LEVETIRACETAM 400 MILLIGRAM(S): 250 TABLET, FILM COATED ORAL at 05:31

## 2021-08-30 RX ADMIN — Medication 100 MILLIGRAM(S): at 21:27

## 2021-08-30 RX ADMIN — LACOSAMIDE 120 MILLIGRAM(S): 50 TABLET ORAL at 06:33

## 2021-08-30 RX ADMIN — HUMAN INSULIN 30 UNIT(S): 100 INJECTION, SUSPENSION SUBCUTANEOUS at 17:08

## 2021-08-30 NOTE — PROGRESS NOTE ADULT - ASSESSMENT
71-year-old diabetic, admitted with HHS/DKA s/p clinical tonic-clonic seizure x2 qualifying for status epilepticus on keppra 3g/d and versed gtt, course c/b respiratory failure, distributive shock, encephalopathy, metabolic derangements, currently remains mechanically ventilated.    -status epilepticus  -HHS/DKA  -acute encephalopathy  -acute respiratory failure    Plan:  Neurological:  - Neuro Checks Q4H  - Continue vEEG with stimulus induced triphasic morphology  - Continue Keppra   - Continue Thiamine 500 IV-->completed  - Daily Statin  - Continue ASA 81 mg daily  - PT/OT, splints on all four  - MR with small acute L occipital infarct, prior vessel imaging stable  - Precedex/Fentanyl IV prn for agitation    Cardiovascular:  - off pressors, MAP >65  - TTE, 8/24, with normal BiV function  - ASA, statin  - propranolol 10 mg every 8 hrs for episodic sympathetic surges with stimulation   - home dose of ACE not restarted due to mild hyperkalemia     Pulmonary:  - Vent support, lung protective settings; PSV trials; will broach trach with family this week pending pt clinical status  - Etco2  - HOB 45  - Recommend Plateau pressure < 30 to maintain venous drainage  - CXR reviewed  - Pulmonary toilet- Albuterol/Mucomyst    GI/:  - Diet: Glucerna 1.2 continuous   - Bowel Regimen: senna/miralax  - Strict Is/Os  - Basilio placed this morning keep for 24 hour and then continue serial straight cath   - Cardura started for urinary retention  - PPI daily  - Last BM: 8/29    Electrolytes:  - Replete as needed  - Keep normoglycemia  - hyperK s/p lokelma    Endo:  - a1c >15.5  - NPH units q6hrs w/ corrective sliding scale q 6  - FS q 6   - glucose goal at 140-180     ID:  - Trend Fever curve and WBC- afebrile  - Completed course of Rocephin  - pct 0.1  - monitor of AB     Hematology:  - SCDs and SQ Lovenox       71-year-old diabetic, admitted with HHS/DKA s/p clinical tonic-clonic seizure x2 qualifying for status epilepticus on keppra 3g/d and versed gtt, course c/b respiratory failure, distributive shock, encephalopathy, metabolic derangements, currently remains mechanically ventilated.    -status epilepticus  -HHS/DKA  -acute encephalopathy  -acute respiratory failure    Plan:  Neurological:  - Neuro Checks Q4H  - Continue vEEG with stimulus induced triphasic morphology  - Video EEG, 8/30: moderate bifrontal focal slowing, moderate-severe generalized slowing, bifrontal/FP sharps  - Continue Keppra   - Continue Vimpat  - Seizure precautions   - Continue Thiamine 500 IV-->completed  - Daily Statin  - Continue ASA 81 mg daily  - PT/OT, splints on all four  - MR with small acute L occipital infarct, prior vessel imaging stable  - Precedex/Fentanyl IV prn for agitation    Cardiovascular:  - off pressors, MAP >65  - TTE, 8/24, with normal BiV function  - ASA, statin  - D/C propranolol   - Start Hydralazine 100mg q8hrs  - home dose of ACE not restarted due to mild hyperkalemia     Pulmonary:  - Vent support, lung protective settings; PSV trials; will broach trach with family this week pending pt clinical status  - Etco2, Keep 35-45  - HOB 45  - Recommend Plateau pressure < 30 to maintain venous drainage  - CXR reviewed  - Chest PT/Pulmonary toilet- Albuterol/Mucomyst    GI/:  - Diet: Glucerna 1.2 continuous   - Bowel Regimen: Senna  - Strict Is/Os  - Basilio placed this morning keep for 24 hour and then continue serial straight cath   - Cardura started for urinary retention  - TOV tomorrow, 8/31  - PPI daily  - Last BM: 8/29  - Transaminitis, AST/ALT 81/74, uptrending, Trend liver enzymes  - Pre-renal BUN/creat 31/0.9, trend BMP, fluid bolus if needed      Electrolytes:  - Replete as needed  - Keep normoglycemia  - hyperK s/p lokelma, resolved     Endo:  - a1c >15.5  - NPH units q6hrs w/ corrective sliding scale q 6  - FS q4hrs   - glucose goal at 140-180     ID:  - Trend Fever curve and WBC- afebrile  - Completed course of Rocephin  - Procalcitonin 8/30: 0.13  - monitor of ABTx  - F/U blood cultures, drawn 8/28  - U/A and sputum, 8/28, negative     Hematology:  - SCDs and SQ Lovenox    Code: Full  Dispo: ICU      Yolanda Hernández, [facility]  [phone]

## 2021-08-30 NOTE — PROGRESS NOTE ADULT - ATTENDING COMMENTS
History, events, data and scans reviewed, patient examined at bedside on 08/30/2021. Assessment and plan of care outlined above was formulated/implemented under my supervision and approval.   Problems:   1) Status epilepticus, likely due to osmotic shift in setting of hyperglycemia: possible bifrontal seizure foci (as reported), Clinically/electrographically controlled, maintain on current AEDs,   2) Depressed level of consciousness: likely due to residual effect of recent midazolam infusion for status and now improving, continue to minimize sedation, will consider spinal tap if MS does not improves (addendum: MS improved to following commands by early evening hours)  3) On MV for airway protection during status: Tolerating spontaneous breathing trial, will asses for weaning once MS improves off sedation  4) Uncontrolled DM, high insulin requirement, increase NPH standing dose, moderate correction sliding scale  5) Volume depletion, pre-renal, replete IV volume with isotonic saline  6) GI/DVT prophylaxis as above

## 2021-08-30 NOTE — EEG REPORT - NS EEG TEXT BOX
Epilepsy Attending Note:     JOSÉ MANUEL PORTER    71y Male  MRN [id number]    Vital Signs Last 24 Hrs  T(C): 37.6 (30 Aug 2021 08:00), Max: 37.6 (30 Aug 2021 08:00)  T(F): 99.7 (30 Aug 2021 08:00), Max: 99.7 (30 Aug 2021 08:00)  HR: 74 (30 Aug 2021 09:00) (64 - 81)  BP: --  BP(mean): --  RR: 23 (30 Aug 2021 09:00) (17 - 26)  SpO2: 100% (30 Aug 2021 09:00) (100% - 100%)                          10.0   9.21  )-----------( 162      ( 30 Aug 2021 04:15 )             31.1       08-30    143  |  105  |  31<H>  ----------------------------<  244<H>  4.9   |  29  |  0.9    Ca    8.8      30 Aug 2021 04:15  Phos  4.0     08-30  Mg     2.3     08-30    TPro  5.0<L>  /  Alb  2.7<L>  /  TBili  <0.2  /  DBili  x   /  AST  81<H>  /  ALT  74<H>  /  AlkPhos  164<H>  08-30      MEDICATIONS  (STANDING):  ALBUTerol    90 MICROgram(s) HFA Inhaler 2 Puff(s) Inhalation every 6 hours  aspirin  chewable 81 milliGRAM(s) Oral daily  atorvastatin 20 milliGRAM(s) Oral at bedtime  chlorhexidine 0.12% Liquid 15 milliLiter(s) Oral Mucosa every 12 hours  chlorhexidine 4% Liquid 1 Application(s) Topical <User Schedule>  dexMEDEtomidine Infusion 0.2 MICROgram(s)/kG/Hr (4.41 mL/Hr) IV Continuous <Continuous>  dextrose 5%. 1000 milliLiter(s) (100 mL/Hr) IV Continuous <Continuous>  doxazosin 2 milliGRAM(s) Oral at bedtime  enoxaparin Injectable 40 milliGRAM(s) SubCutaneous daily  insulin NPH human recombinant 30 Unit(s) SubCutaneous every 6 hours  insulin regular  human corrective regimen sliding scale   SubCutaneous every 6 hours  lacosamide IVPB 100 milliGRAM(s) IV Intermittent every 12 hours  levETIRAcetam  IVPB 1000 milliGRAM(s) IV Intermittent <User Schedule>  pantoprazole   Suspension 40 milliGRAM(s) Oral daily  propranolol 10 milliGRAM(s) Oral every 8 hours  senna 2 Tablet(s) Oral at bedtime    MEDICATIONS  (PRN):  acetaminophen   Tablet .. 650 milliGRAM(s) Oral every 6 hours PRN Temp greater or equal to 38C (100.4F)  fentaNYL    Injectable 25 MICROGram(s) IV Push every 2 hours PRN Agitation, vent dyssynchrony            VEEG in the last 24 hours:    Background------------continues , low amplitude, reactive and more so towards the latter part of the recording and showing eye opening     Focal and generalized slowing--------moderate to severe generalized slowing. moderate bifrontal focal slowing    Interictal activity---------------  small number of bifrontal/FP sharp waves that are probably epileptiform in nature    Events-----------------------  none    Seizures-------------------  none    Impression: ----------abnormal as above    Plan - as/NCC team

## 2021-08-30 NOTE — PROGRESS NOTE ADULT - SUBJECTIVE AND OBJECTIVE BOX
pt seen this morning, d/w neuro crit team on am rounds, d/w RN at bedside  pt remains vent dependent, on min precedex, on video EEG when seen  abd soft, ND, OG feeds tolerated, + large soft brown BM this morning  + large volume oral secretions  + fever  pt moves ext spontaneously and randomly, but not in response to touch or commands  Vital Signs Last 24 Hrs  T(C): 38 (30 Aug 2021 18:00), Max: 38 (30 Aug 2021 18:00)  T(F): 100.4 (30 Aug 2021 18:00), Max: 100.4 (30 Aug 2021 18:00)  HR: 82 (30 Aug 2021 19:00) (64 - 94)  BP: 114/45 (30 Aug 2021 18:00) (114/45 - 114/45)  BP(mean): 66 (30 Aug 2021 18:00) (66 - 66)  RR: 22 (30 Aug 2021 19:00) (17 - 30)  SpO2: 100% (30 Aug 2021 19:00) (94% - 100%)    MEDICATIONS  (STANDING):  ALBUTerol    90 MICROgram(s) HFA Inhaler 2 Puff(s) Inhalation every 6 hours  aspirin  chewable 81 milliGRAM(s) Oral daily  atorvastatin 20 milliGRAM(s) Oral at bedtime  chlorhexidine 0.12% Liquid 15 milliLiter(s) Oral Mucosa every 12 hours  chlorhexidine 4% Liquid 1 Application(s) Topical <User Schedule>  dexMEDEtomidine Infusion 0.2 MICROgram(s)/kG/Hr (4.41 mL/Hr) IV Continuous <Continuous>  doxazosin 2 milliGRAM(s) Oral at bedtime  enoxaparin Injectable 40 milliGRAM(s) SubCutaneous daily  hydrALAZINE 100 milliGRAM(s) Oral every 8 hours  insulin NPH human recombinant 30 Unit(s) SubCutaneous every 6 hours  insulin regular  human corrective regimen sliding scale   SubCutaneous every 6 hours  lacosamide IVPB 100 milliGRAM(s) IV Intermittent every 12 hours  levETIRAcetam  IVPB 1000 milliGRAM(s) IV Intermittent <User Schedule>  pantoprazole    Tablet 40 milliGRAM(s) Oral before breakfast  pantoprazole   Suspension 40 milliGRAM(s) Oral daily  senna 2 Tablet(s) Oral at bedtime                        10.0   9.21  )-----------( 162      ( 30 Aug 2021 04:15 )             31.1   08-30    142  |  102  |  29<H>  ----------------------------<  213<H>  5.0   |  27  |  0.9    Ca    9.0      30 Aug 2021 14:50  Phos  3.9     08-30  Mg     2.3     08-30    TPro  5.0<L>  /  Alb  2.7<L>  /  TBili  <0.2  /  DBili  x   /  AST  81<H>  /  ALT  74<H>  /  AlkPhos  164<H>  08-30  Mode: AC/ CMV (Assist Control/ Continuous Mandatory Ventilation)  RR (machine): 16  TV (machine): 420  FiO2: 40  PEEP: 5  ITime: 1  ABG - ( 30 Aug 2021 15:33 )  pH, Arterial: 7.49  pH, Blood: x     /  pCO2: 42    /  pO2: 69    / HCO3: 32    / Base Excess: 7.8   /  SaO2: 96.1      POCT Blood Glucose.: 234 mg/dL (30 Aug 2021 17:04)  POCT Blood Glucose.: 234 mg/dL (30 Aug 2021 11:35)  POCT Blood Glucose.: 228 mg/dL (30 Aug 2021 05:23)  POCT Blood Glucose.: 236 mg/dL (30 Aug 2021 00:18)

## 2021-08-30 NOTE — PROGRESS NOTE ADULT - SUBJECTIVE AND OBJECTIVE BOX
SUMMARY: Brought in by EMS for AMS, R HP, and aphasia s/p seizure like activity at home w/ no reported seizure hx. On arrival to ED, stroke code called for NIHSS 9. x2 more witnessed seizures were noted along w/ a GTC seizure lasting >2 min. Responded to 2gr Ativan and loaded with Keppra 40 mg/kg. CTH unremarkable. CTA/CTP w/ no aneurysm/AVM/LVO. Patient noted to be in HHS w/ glucose >600. Admitted to MICU and started on an insulin gtt. Course c/b respiratory failure, distributive shock, encephalopathy, metabolic derangements. *NCCU to assume care as primary team (8/27) for continued status epilepticus management and given resolving metabolic abnormalities*    OVERNIGHT EVENTS: Remains intubated. No sedation needed. Afebrile. Hemodynamically stable, no pressors. On video EEG with no reports of clinical seizures.     REVIEW OF SYSTEMS: [X] A ten-point ROS was otherwise negative except as noted in HPI    VITALS: [x] Reviewed    IMAGING/DATA: [x] Reviewed    IVF FLUIDS/MEDICATIONS: [x] Reviewed    No Known Allergies    DEVICES:   [] Restraints [] PIVs [x] ET tube [x] central line [] PICC [x] arterial line [x] lubin [x] NGT/OGT [] EVD [] LD [] EVANGELISTA/HMV [] LiCOX [] ICP monitor [] Trach [] PEG [] Chest Tube [] other:    EXAMINATION:  General: No acute distress  HEENT: Anicteric sclerae  Cardiac: [id number]  Lungs: Clear. Intubated   Abdomen: Soft, non-tender, +BS  Extremities: No c/c/e  Skin: Clean, dry and intact  Neurologic:         ICU Vital Signs Last 24 Hrs  T(C): 37.1 (29 Aug 2021 20:00), Max: 37.3 (29 Aug 2021 12:00)  T(F): 98.8 (29 Aug 2021 20:00), Max: 99.1 (29 Aug 2021 12:00)  HR: 72 (30 Aug 2021 00:00) (64 - 78)  BP: --  BP(mean): --  ABP: 138/54 (30 Aug 2021 00:00) (120/52 - 180/64)  ABP(mean): 84 (30 Aug 2021 00:00) (74 - 106)  RR: 18 (30 Aug 2021 00:00) (17 - 30)  SpO2: 100% (30 Aug 2021 00:00) (100% - 100%)      08-28-21 @ 07:01  -  08-29-21 @ 07:00  --------------------------------------------------------  IN: 2412.6 mL / OUT: 1375 mL / NET: 1037.6 mL    08-29-21 @ 07:01  -  08-30-21 @ 03:27  --------------------------------------------------------  IN: 1863 mL / OUT: 1125 mL / NET: 738 mL    Mode: AC/ CMV (Assist Control/ Continuous Mandatory Ventilation), RR (machine): 16, TV (machine): 420, FiO2: 30, PEEP: 5, ITime: 1, MAP: 10, PIP: 21    acetaminophen   Tablet .. 650 milliGRAM(s) Oral every 6 hours PRN  acetylcysteine 10%  Inhalation 3 milliLiter(s) Inhalation every 6 hours  ALBUTerol    90 MICROgram(s) HFA Inhaler 2 Puff(s) Inhalation every 6 hours  aspirin  chewable 81 milliGRAM(s) Oral daily  atorvastatin 20 milliGRAM(s) Oral at bedtime  chlorhexidine 0.12% Liquid 15 milliLiter(s) Oral Mucosa every 12 hours  chlorhexidine 4% Liquid 1 Application(s) Topical <User Schedule>  dexMEDEtomidine Infusion 0.2 MICROgram(s)/kG/Hr (4.41 mL/Hr) IV Continuous <Continuous>  dextrose 5%. 1000 milliLiter(s) (100 mL/Hr) IV Continuous <Continuous>  doxazosin 2 milliGRAM(s) Oral at bedtime  enoxaparin Injectable 40 milliGRAM(s) SubCutaneous daily  fentaNYL    Injectable 25 MICROGram(s) IV Push every 2 hours PRN  insulin NPH human recombinant 30 Unit(s) SubCutaneous every 6 hours  insulin regular  human corrective regimen sliding scale   SubCutaneous every 6 hours  lacosamide IVPB 100 milliGRAM(s) IV Intermittent every 12 hours  levETIRAcetam  IVPB 1000 milliGRAM(s) IV Intermittent <User Schedule>  pantoprazole   Suspension 40 milliGRAM(s) Oral daily  propranolol 10 milliGRAM(s) Oral every 8 hours  senna 2 Tablet(s) Oral at bedtime    LABS:  Na: 139 (08-29 @ 03:24), 143 (08-28 @ 14:10), 145 (08-28 @ 09:02), 144 (08-28 @ 03:18), 141 (08-27 @ 04:30)  K: 4.9 (08-29 @ 03:24), 4.9 (08-28 @ 14:10), 4.8 (08-28 @ 09:02), 5.4 (08-28 @ 03:18), 5.1 (08-27 @ 04:30)  Cl: 104 (08-29 @ 03:24), 108 (08-28 @ 14:10), 109 (08-28 @ 09:02), 108 (08-28 @ 03:18), 107 (08-27 @ 04:30)  CO2: 27 (08-29 @ 03:24), 27 (08-28 @ 14:10), 26 (08-28 @ 09:02), 27 (08-28 @ 03:18), 25 (08-27 @ 04:30)  BUN: 28 (08-29 @ 03:24), 21 (08-28 @ 14:10), 22 (08-28 @ 09:02), 20 (08-28 @ 03:18), 17 (08-27 @ 04:30)  Cr: 0.9 (08-29 @ 03:24), 1.0 (08-28 @ 14:10), 1.0 (08-28 @ 09:02), 1.0 (08-28 @ 03:18), 1.1 (08-27 @ 04:30)  Glu: 242(08-29 @ 03:24), 223(08-28 @ 14:10), 222(08-28 @ 09:02), 295(08-28 @ 03:18), 352(08-27 @ 04:30)    Hgb: 10.1 (08-29 @ 03:24), 10.6 (08-28 @ 03:18), 10.7 (08-27 @ 04:30)  Hct: 32.3 (08-29 @ 03:24), 32.9 (08-28 @ 03:18), 33.1 (08-27 @ 04:30)  WBC: 9.01 (08-29 @ 03:24), 7.22 (08-28 @ 03:18), 7.97 (08-27 @ 04:30)  Plt: 160 (08-29 @ 03:24), 143 (08-28 @ 03:18), 123 (08-27 @ 04:30)    LIVER FUNCTIONS - ( 29 Aug 2021 03:24 )  Alb: 2.7 g/dL / Pro: 4.9 g/dL / ALK PHOS: 123 U/L / ALT: 39 U/L / AST: 43 U/L / GGT: x           ABG - ( 29 Aug 2021 18:16 )  pH, Arterial: 7.44  pH, Blood: x     /  pCO2: 48    /  pO2: 94    / HCO3: 33    / Base Excess: 7.2   /  SaO2: 98.5                 SUMMARY: Brought in by EMS for AMS, R HP, and aphasia s/p seizure like activity at home w/ no reported seizure hx. On arrival to ED, stroke code called for NIHSS 9. x2 more witnessed seizures were noted along w/ a GTC seizure lasting >2 min. Responded to 2gr Ativan and loaded with Keppra 40 mg/kg. CTH unremarkable. CTA/CTP w/ no aneurysm/AVM/LVO. Patient noted to be in HHS w/ glucose >600. Admitted to MICU and started on an insulin gtt. Course c/b respiratory failure, distributive shock, encephalopathy, metabolic derangements. *NCCU to assume care as primary team (8/27) for continued status epilepticus management and given resolving metabolic abnormalities*    OVERNIGHT EVENTS: Remains intubated. No sedation needed. Afebrile. Hemodynamically stable, no pressors. On video EEG with no reports of clinical seizures.     REVIEW OF SYSTEMS: [X] A ten-point ROS was otherwise negative except as noted in HPI    VITALS: [x] Reviewed    IMAGING/DATA: [x] Reviewed    IVF FLUIDS/MEDICATIONS: [x] Reviewed    No Known Allergies    DEVICES:   [] Restraints [] PIVs [x] ET tube [x] central line [] PICC [x] arterial line [x] lubin [x] NGT/OGT [] EVD [] LD [] EVANGELISTA/HMV [] LiCOX [] ICP monitor [] Trach [] PEG [] Chest Tube [] other:    EXAMINATION:  General: No acute distress  HEENT: Anicteric sclerae  Cardiac: [id number]  Lungs: Clear. Intubated   Abdomen: Soft, non-tender, +BS  Extremities: No c/c/e  Skin: Clean, dry and intact  Neurologic: Patient mechanically vented, sedated on Precedex @ 0.4mcg, opens eyes to voice and noxious stimuli, able to track to voice w/ notable left gaze preference. Does not follow commands. (+) corneal (+) blink to threat (+) cough/gag. Able to lift B/L UE AG and move B/L LE in plane of bed spontaneously, however, not reproducible w/ noxious stimuli Hyporeflexic. Sensation intact.     ICU Vital Signs Last 24 Hrs  T(C): 37.7 (30 Aug 2021 12:00), Max: 37.7 (30 Aug 2021 12:00)  T(F): 99.9 (30 Aug 2021 12:00), Max: 99.9 (30 Aug 2021 12:00)  HR: 90 (30 Aug 2021 11:00) (64 - 90)  BP: --  BP(mean): --  ABP: 212/82 (30 Aug 2021 11:00) (120/52 - 212/82)  ABP(mean): 134 (30 Aug 2021 11:00) (78 - 134)  RR: 30 (30 Aug 2021 11:00) (17 - 30)  SpO2: 100% (30 Aug 2021 11:00) (100% - 100%)    08-28-21 @ 07:01  -  08-29-21 @ 07:00  --------------------------------------------------------  IN: 2412.6 mL / OUT: 1375 mL / NET: 1037.6 mL    08-29-21 @ 07:01  -  08-30-21 @ 03:27  --------------------------------------------------------  IN: 1863 mL / OUT: 1125 mL / NET: 738 mL    Mode: AC/ CMV (Assist Control/ Continuous Mandatory Ventilation), RR (machine): 16, TV (machine): 420, FiO2: 30, PEEP: 5, ITime: 1, MAP: 10, PIP: 21    acetaminophen   Tablet .. 650 milliGRAM(s) Oral every 6 hours PRN  acetylcysteine 10%  Inhalation 3 milliLiter(s) Inhalation every 6 hours  ALBUTerol    90 MICROgram(s) HFA Inhaler 2 Puff(s) Inhalation every 6 hours  aspirin  chewable 81 milliGRAM(s) Oral daily  atorvastatin 20 milliGRAM(s) Oral at bedtime  chlorhexidine 0.12% Liquid 15 milliLiter(s) Oral Mucosa every 12 hours  chlorhexidine 4% Liquid 1 Application(s) Topical <User Schedule>  dexMEDEtomidine Infusion 0.2 MICROgram(s)/kG/Hr (4.41 mL/Hr) IV Continuous <Continuous>  dextrose 5%. 1000 milliLiter(s) (100 mL/Hr) IV Continuous <Continuous>  doxazosin 2 milliGRAM(s) Oral at bedtime  enoxaparin Injectable 40 milliGRAM(s) SubCutaneous daily  fentaNYL    Injectable 25 MICROGram(s) IV Push every 2 hours PRN  insulin NPH human recombinant 30 Unit(s) SubCutaneous every 6 hours  insulin regular  human corrective regimen sliding scale   SubCutaneous every 6 hours  lacosamide IVPB 100 milliGRAM(s) IV Intermittent every 12 hours  levETIRAcetam  IVPB 1000 milliGRAM(s) IV Intermittent <User Schedule>  pantoprazole   Suspension 40 milliGRAM(s) Oral daily  propranolol 10 milliGRAM(s) Oral every 8 hours  senna 2 Tablet(s) Oral at bedtime    LABS:  Na: 139 (08-29 @ 03:24), 143 (08-28 @ 14:10), 145 (08-28 @ 09:02), 144 (08-28 @ 03:18), 141 (08-27 @ 04:30)  K: 4.9 (08-29 @ 03:24), 4.9 (08-28 @ 14:10), 4.8 (08-28 @ 09:02), 5.4 (08-28 @ 03:18), 5.1 (08-27 @ 04:30)  Cl: 104 (08-29 @ 03:24), 108 (08-28 @ 14:10), 109 (08-28 @ 09:02), 108 (08-28 @ 03:18), 107 (08-27 @ 04:30)  CO2: 27 (08-29 @ 03:24), 27 (08-28 @ 14:10), 26 (08-28 @ 09:02), 27 (08-28 @ 03:18), 25 (08-27 @ 04:30)  BUN: 28 (08-29 @ 03:24), 21 (08-28 @ 14:10), 22 (08-28 @ 09:02), 20 (08-28 @ 03:18), 17 (08-27 @ 04:30)  Cr: 0.9 (08-29 @ 03:24), 1.0 (08-28 @ 14:10), 1.0 (08-28 @ 09:02), 1.0 (08-28 @ 03:18), 1.1 (08-27 @ 04:30)  Glu: 242(08-29 @ 03:24), 223(08-28 @ 14:10), 222(08-28 @ 09:02), 295(08-28 @ 03:18), 352(08-27 @ 04:30)    Hgb: 10.1 (08-29 @ 03:24), 10.6 (08-28 @ 03:18), 10.7 (08-27 @ 04:30)  Hct: 32.3 (08-29 @ 03:24), 32.9 (08-28 @ 03:18), 33.1 (08-27 @ 04:30)  WBC: 9.01 (08-29 @ 03:24), 7.22 (08-28 @ 03:18), 7.97 (08-27 @ 04:30)  Plt: 160 (08-29 @ 03:24), 143 (08-28 @ 03:18), 123 (08-27 @ 04:30)    LIVER FUNCTIONS - ( 29 Aug 2021 03:24 )  Alb: 2.7 g/dL / Pro: 4.9 g/dL / ALK PHOS: 123 U/L / ALT: 39 U/L / AST: 43 U/L / GGT: x           ABG - ( 29 Aug 2021 18:16 )  pH, Arterial: 7.44  pH, Blood: x     /  pCO2: 48    /  pO2: 94    / HCO3: 33    / Base Excess: 7.2   /  SaO2: 98.5

## 2021-08-31 LAB
ALBUMIN SERPL ELPH-MCNC: 2.7 G/DL — LOW (ref 3.5–5.2)
ALBUMIN SERPL ELPH-MCNC: 3 G/DL — LOW (ref 3.5–5.2)
ALP SERPL-CCNC: 178 U/L — HIGH (ref 30–115)
ALP SERPL-CCNC: 207 U/L — HIGH (ref 30–115)
ALT FLD-CCNC: 111 U/L — HIGH (ref 0–41)
ALT FLD-CCNC: 94 U/L — HIGH (ref 0–41)
AMMONIA BLD-MCNC: 19 UMOL/L — SIGNIFICANT CHANGE UP (ref 11–55)
AMYLASE P1 CFR SERPL: 127 U/L — HIGH (ref 25–115)
ANION GAP SERPL CALC-SCNC: 10 MMOL/L — SIGNIFICANT CHANGE UP (ref 7–14)
ANION GAP SERPL CALC-SCNC: 10 MMOL/L — SIGNIFICANT CHANGE UP (ref 7–14)
APPEARANCE UR: CLEAR — SIGNIFICANT CHANGE UP
AST SERPL-CCNC: 59 U/L — HIGH (ref 0–41)
AST SERPL-CCNC: 88 U/L — HIGH (ref 0–41)
BASE EXCESS BLDA CALC-SCNC: 5.6 MMOL/L — HIGH (ref -2–3)
BASE EXCESS BLDA CALC-SCNC: 5.8 MMOL/L — HIGH (ref -2–3)
BASE EXCESS BLDA CALC-SCNC: 5.8 MMOL/L — HIGH (ref -2–3)
BILIRUB SERPL-MCNC: 0.2 MG/DL — SIGNIFICANT CHANGE UP (ref 0.2–1.2)
BILIRUB SERPL-MCNC: 0.3 MG/DL — SIGNIFICANT CHANGE UP (ref 0.2–1.2)
BILIRUB UR-MCNC: NEGATIVE — SIGNIFICANT CHANGE UP
BUN SERPL-MCNC: 40 MG/DL — HIGH (ref 10–20)
BUN SERPL-MCNC: 40 MG/DL — HIGH (ref 10–20)
CALCIUM SERPL-MCNC: 8.7 MG/DL — SIGNIFICANT CHANGE UP (ref 8.5–10.1)
CALCIUM SERPL-MCNC: 9.2 MG/DL — SIGNIFICANT CHANGE UP (ref 8.5–10.1)
CHLORIDE SERPL-SCNC: 103 MMOL/L — SIGNIFICANT CHANGE UP (ref 98–110)
CHLORIDE SERPL-SCNC: 99 MMOL/L — SIGNIFICANT CHANGE UP (ref 98–110)
CO2 SERPL-SCNC: 28 MMOL/L — SIGNIFICANT CHANGE UP (ref 17–32)
CO2 SERPL-SCNC: 29 MMOL/L — SIGNIFICANT CHANGE UP (ref 17–32)
COLOR SPEC: SIGNIFICANT CHANGE UP
CREAT SERPL-MCNC: 1.1 MG/DL — SIGNIFICANT CHANGE UP (ref 0.7–1.5)
CREAT SERPL-MCNC: 1.1 MG/DL — SIGNIFICANT CHANGE UP (ref 0.7–1.5)
CULTURE RESULTS: SIGNIFICANT CHANGE UP
CULTURE RESULTS: SIGNIFICANT CHANGE UP
DIFF PNL FLD: SIGNIFICANT CHANGE UP
GAS PNL BLDA: SIGNIFICANT CHANGE UP
GLUCOSE BLDC GLUCOMTR-MCNC: 135 MG/DL — HIGH (ref 70–99)
GLUCOSE BLDC GLUCOMTR-MCNC: 140 MG/DL — HIGH (ref 70–99)
GLUCOSE BLDC GLUCOMTR-MCNC: 169 MG/DL — HIGH (ref 70–99)
GLUCOSE BLDC GLUCOMTR-MCNC: 177 MG/DL — HIGH (ref 70–99)
GLUCOSE BLDC GLUCOMTR-MCNC: 204 MG/DL — HIGH (ref 70–99)
GLUCOSE BLDC GLUCOMTR-MCNC: 271 MG/DL — HIGH (ref 70–99)
GLUCOSE SERPL-MCNC: 232 MG/DL — HIGH (ref 70–99)
GLUCOSE SERPL-MCNC: 253 MG/DL — HIGH (ref 70–99)
GLUCOSE UR QL: NEGATIVE — SIGNIFICANT CHANGE UP
GRAM STN FLD: SIGNIFICANT CHANGE UP
HCO3 BLDA-SCNC: 30 MMOL/L — HIGH (ref 21–28)
HCO3 BLDA-SCNC: 30 MMOL/L — HIGH (ref 21–28)
HCO3 BLDA-SCNC: 31 MMOL/L — HIGH (ref 21–28)
HCT VFR BLD CALC: 32.2 % — LOW (ref 42–52)
HGB BLD-MCNC: 10.4 G/DL — LOW (ref 14–18)
HOROWITZ INDEX BLDA+IHG-RTO: 40 — SIGNIFICANT CHANGE UP
KETONES UR-MCNC: NEGATIVE — SIGNIFICANT CHANGE UP
LACTATE SERPL-SCNC: 1.9 MMOL/L — SIGNIFICANT CHANGE UP (ref 0.7–2)
LEUKOCYTE ESTERASE UR-ACNC: NEGATIVE — SIGNIFICANT CHANGE UP
LIDOCAIN IGE QN: 12 U/L — SIGNIFICANT CHANGE UP (ref 7–60)
MAGNESIUM SERPL-MCNC: 2.4 MG/DL — SIGNIFICANT CHANGE UP (ref 1.8–2.4)
MCHC RBC-ENTMCNC: 29.1 PG — SIGNIFICANT CHANGE UP (ref 27–31)
MCHC RBC-ENTMCNC: 32.3 G/DL — SIGNIFICANT CHANGE UP (ref 32–37)
MCV RBC AUTO: 90.2 FL — SIGNIFICANT CHANGE UP (ref 80–94)
NITRITE UR-MCNC: NEGATIVE — SIGNIFICANT CHANGE UP
NRBC # BLD: 0 /100 WBCS — SIGNIFICANT CHANGE UP (ref 0–0)
PCO2 BLDA: 40 MMHG — SIGNIFICANT CHANGE UP (ref 35–48)
PCO2 BLDA: 41 MMHG — SIGNIFICANT CHANGE UP (ref 35–48)
PCO2 BLDA: 44 MMHG — SIGNIFICANT CHANGE UP (ref 35–48)
PH BLDA: 7.45 — SIGNIFICANT CHANGE UP (ref 7.35–7.45)
PH BLDA: 7.47 — HIGH (ref 7.35–7.45)
PH BLDA: 7.48 — HIGH (ref 7.35–7.45)
PH UR: 6 — SIGNIFICANT CHANGE UP (ref 5–8)
PHOSPHATE SERPL-MCNC: 4.6 MG/DL — SIGNIFICANT CHANGE UP (ref 2.1–4.9)
PLATELET # BLD AUTO: 221 K/UL — SIGNIFICANT CHANGE UP (ref 130–400)
PO2 BLDA: 114 MMHG — HIGH (ref 83–108)
PO2 BLDA: 144 MMHG — HIGH (ref 83–108)
PO2 BLDA: 75 MMHG — LOW (ref 83–108)
POTASSIUM SERPL-MCNC: 4.6 MMOL/L — SIGNIFICANT CHANGE UP (ref 3.5–5)
POTASSIUM SERPL-MCNC: 4.7 MMOL/L — SIGNIFICANT CHANGE UP (ref 3.5–5)
POTASSIUM SERPL-SCNC: 4.6 MMOL/L — SIGNIFICANT CHANGE UP (ref 3.5–5)
POTASSIUM SERPL-SCNC: 4.7 MMOL/L — SIGNIFICANT CHANGE UP (ref 3.5–5)
PROT SERPL-MCNC: 5.1 G/DL — LOW (ref 6–8)
PROT SERPL-MCNC: 5.4 G/DL — LOW (ref 6–8)
PROT UR-MCNC: SIGNIFICANT CHANGE UP
RBC # BLD: 3.57 M/UL — LOW (ref 4.7–6.1)
RBC # FLD: 13.7 % — SIGNIFICANT CHANGE UP (ref 11.5–14.5)
SAO2 % BLDA: 96.9 % — SIGNIFICANT CHANGE UP (ref 94–98)
SAO2 % BLDA: 99.1 % — HIGH (ref 94–98)
SAO2 % BLDA: 99.8 % — HIGH (ref 94–98)
SODIUM SERPL-SCNC: 138 MMOL/L — SIGNIFICANT CHANGE UP (ref 135–146)
SODIUM SERPL-SCNC: 141 MMOL/L — SIGNIFICANT CHANGE UP (ref 135–146)
SP GR SPEC: 1.02 — SIGNIFICANT CHANGE UP (ref 1.01–1.03)
SPECIMEN SOURCE: SIGNIFICANT CHANGE UP
UROBILINOGEN FLD QL: SIGNIFICANT CHANGE UP
WBC # BLD: 13.59 K/UL — HIGH (ref 4.8–10.8)
WBC # FLD AUTO: 13.59 K/UL — HIGH (ref 4.8–10.8)

## 2021-08-31 PROCEDURE — 95720 EEG PHY/QHP EA INCR W/VEEG: CPT

## 2021-08-31 PROCEDURE — 71045 X-RAY EXAM CHEST 1 VIEW: CPT | Mod: 26

## 2021-08-31 PROCEDURE — 76705 ECHO EXAM OF ABDOMEN: CPT | Mod: 26

## 2021-08-31 PROCEDURE — 71045 X-RAY EXAM CHEST 1 VIEW: CPT | Mod: 26,77

## 2021-08-31 PROCEDURE — 99291 CRITICAL CARE FIRST HOUR: CPT

## 2021-08-31 RX ORDER — SODIUM CHLORIDE 9 MG/ML
500 INJECTION INTRAMUSCULAR; INTRAVENOUS; SUBCUTANEOUS ONCE
Refills: 0 | Status: COMPLETED | OUTPATIENT
Start: 2021-08-31 | End: 2021-08-31

## 2021-08-31 RX ORDER — VANCOMYCIN HCL 1 G
VIAL (EA) INTRAVENOUS
Refills: 0 | Status: DISCONTINUED | OUTPATIENT
Start: 2021-08-31 | End: 2021-08-31

## 2021-08-31 RX ORDER — PIPERACILLIN AND TAZOBACTAM 4; .5 G/20ML; G/20ML
4.5 INJECTION, POWDER, LYOPHILIZED, FOR SOLUTION INTRAVENOUS ONCE
Refills: 0 | Status: COMPLETED | OUTPATIENT
Start: 2021-08-31 | End: 2021-08-31

## 2021-08-31 RX ORDER — CEFEPIME 1 G/1
INJECTION, POWDER, FOR SOLUTION INTRAMUSCULAR; INTRAVENOUS
Refills: 0 | Status: DISCONTINUED | OUTPATIENT
Start: 2021-08-31 | End: 2021-08-31

## 2021-08-31 RX ORDER — CEFEPIME 1 G/1
2000 INJECTION, POWDER, FOR SOLUTION INTRAMUSCULAR; INTRAVENOUS EVERY 8 HOURS
Refills: 0 | Status: DISCONTINUED | OUTPATIENT
Start: 2021-08-31 | End: 2021-09-01

## 2021-08-31 RX ORDER — VANCOMYCIN HCL 1 G
1000 VIAL (EA) INTRAVENOUS EVERY 12 HOURS
Refills: 0 | Status: DISCONTINUED | OUTPATIENT
Start: 2021-08-31 | End: 2021-08-31

## 2021-08-31 RX ORDER — PIPERACILLIN AND TAZOBACTAM 4; .5 G/20ML; G/20ML
3.38 INJECTION, POWDER, LYOPHILIZED, FOR SOLUTION INTRAVENOUS EVERY 8 HOURS
Refills: 0 | Status: DISCONTINUED | OUTPATIENT
Start: 2021-08-31 | End: 2021-08-31

## 2021-08-31 RX ORDER — PIPERACILLIN AND TAZOBACTAM 4; .5 G/20ML; G/20ML
4.5 INJECTION, POWDER, LYOPHILIZED, FOR SOLUTION INTRAVENOUS EVERY 8 HOURS
Refills: 0 | Status: DISCONTINUED | OUTPATIENT
Start: 2021-08-31 | End: 2021-08-31

## 2021-08-31 RX ORDER — CEFEPIME 1 G/1
INJECTION, POWDER, FOR SOLUTION INTRAMUSCULAR; INTRAVENOUS
Refills: 0 | Status: DISCONTINUED | OUTPATIENT
Start: 2021-08-31 | End: 2021-09-01

## 2021-08-31 RX ORDER — VANCOMYCIN HCL 1 G
1250 VIAL (EA) INTRAVENOUS EVERY 12 HOURS
Refills: 0 | Status: DISCONTINUED | OUTPATIENT
Start: 2021-08-31 | End: 2021-08-31

## 2021-08-31 RX ORDER — VANCOMYCIN HCL 1 G
1000 VIAL (EA) INTRAVENOUS ONCE
Refills: 0 | Status: COMPLETED | OUTPATIENT
Start: 2021-08-31 | End: 2021-08-31

## 2021-08-31 RX ORDER — SODIUM CHLORIDE 9 MG/ML
3 INJECTION INTRAMUSCULAR; INTRAVENOUS; SUBCUTANEOUS EVERY 6 HOURS
Refills: 0 | Status: DISCONTINUED | OUTPATIENT
Start: 2021-08-31 | End: 2021-09-02

## 2021-08-31 RX ORDER — LABETALOL HCL 100 MG
10 TABLET ORAL EVERY 6 HOURS
Refills: 0 | Status: DISCONTINUED | OUTPATIENT
Start: 2021-08-31 | End: 2021-08-31

## 2021-08-31 RX ORDER — VANCOMYCIN HCL 1 G
VIAL (EA) INTRAVENOUS
Refills: 0 | Status: DISCONTINUED | OUTPATIENT
Start: 2021-08-31 | End: 2021-09-05

## 2021-08-31 RX ORDER — HUMAN INSULIN 100 [IU]/ML
35 INJECTION, SUSPENSION SUBCUTANEOUS EVERY 6 HOURS
Refills: 0 | Status: DISCONTINUED | OUTPATIENT
Start: 2021-08-31 | End: 2021-08-31

## 2021-08-31 RX ORDER — IPRATROPIUM/ALBUTEROL SULFATE 18-103MCG
3 AEROSOL WITH ADAPTER (GRAM) INHALATION ONCE
Refills: 0 | Status: COMPLETED | OUTPATIENT
Start: 2021-08-31 | End: 2021-08-31

## 2021-08-31 RX ORDER — VANCOMYCIN HCL 1 G
1000 VIAL (EA) INTRAVENOUS
Refills: 0 | Status: DISCONTINUED | OUTPATIENT
Start: 2021-09-01 | End: 2021-09-05

## 2021-08-31 RX ORDER — SODIUM CHLORIDE 9 MG/ML
1000 INJECTION INTRAMUSCULAR; INTRAVENOUS; SUBCUTANEOUS ONCE
Refills: 0 | Status: COMPLETED | OUTPATIENT
Start: 2021-08-31 | End: 2021-08-31

## 2021-08-31 RX ORDER — SODIUM CHLORIDE 9 MG/ML
1000 INJECTION INTRAMUSCULAR; INTRAVENOUS; SUBCUTANEOUS
Refills: 0 | Status: DISCONTINUED | OUTPATIENT
Start: 2021-08-31 | End: 2021-09-01

## 2021-08-31 RX ORDER — PIPERACILLIN AND TAZOBACTAM 4; .5 G/20ML; G/20ML
3.38 INJECTION, POWDER, LYOPHILIZED, FOR SOLUTION INTRAVENOUS ONCE
Refills: 0 | Status: DISCONTINUED | OUTPATIENT
Start: 2021-08-31 | End: 2021-08-31

## 2021-08-31 RX ORDER — HUMAN INSULIN 100 [IU]/ML
25 INJECTION, SUSPENSION SUBCUTANEOUS ONCE
Refills: 0 | Status: COMPLETED | OUTPATIENT
Start: 2021-08-31 | End: 2021-08-31

## 2021-08-31 RX ORDER — IPRATROPIUM/ALBUTEROL SULFATE 18-103MCG
3 AEROSOL WITH ADAPTER (GRAM) INHALATION EVERY 6 HOURS
Refills: 0 | Status: DISCONTINUED | OUTPATIENT
Start: 2021-08-31 | End: 2021-09-09

## 2021-08-31 RX ORDER — VANCOMYCIN HCL 1 G
2000 VIAL (EA) INTRAVENOUS ONCE
Refills: 0 | Status: DISCONTINUED | OUTPATIENT
Start: 2021-08-31 | End: 2021-08-31

## 2021-08-31 RX ORDER — LABETALOL HCL 100 MG
10 TABLET ORAL EVERY 6 HOURS
Refills: 0 | Status: DISCONTINUED | OUTPATIENT
Start: 2021-08-31 | End: 2021-09-09

## 2021-08-31 RX ORDER — CEFEPIME 1 G/1
1000 INJECTION, POWDER, FOR SOLUTION INTRAMUSCULAR; INTRAVENOUS EVERY 12 HOURS
Refills: 0 | Status: DISCONTINUED | OUTPATIENT
Start: 2021-08-31 | End: 2021-08-31

## 2021-08-31 RX ORDER — CEFEPIME 1 G/1
2000 INJECTION, POWDER, FOR SOLUTION INTRAMUSCULAR; INTRAVENOUS ONCE
Refills: 0 | Status: COMPLETED | OUTPATIENT
Start: 2021-08-31 | End: 2021-08-31

## 2021-08-31 RX ORDER — CEFEPIME 1 G/1
1000 INJECTION, POWDER, FOR SOLUTION INTRAMUSCULAR; INTRAVENOUS ONCE
Refills: 0 | Status: DISCONTINUED | OUTPATIENT
Start: 2021-08-31 | End: 2021-08-31

## 2021-08-31 RX ORDER — INSULIN HUMAN 100 [IU]/ML
INJECTION, SOLUTION SUBCUTANEOUS EVERY 4 HOURS
Refills: 0 | Status: DISCONTINUED | OUTPATIENT
Start: 2021-08-31 | End: 2021-09-02

## 2021-08-31 RX ADMIN — LEVETIRACETAM 400 MILLIGRAM(S): 250 TABLET, FILM COATED ORAL at 13:07

## 2021-08-31 RX ADMIN — Medication 10 MILLIGRAM(S): at 23:25

## 2021-08-31 RX ADMIN — FENTANYL CITRATE 25 MICROGRAM(S): 50 INJECTION INTRAVENOUS at 02:08

## 2021-08-31 RX ADMIN — ALBUTEROL 2 PUFF(S): 90 AEROSOL, METERED ORAL at 07:59

## 2021-08-31 RX ADMIN — Medication 2 MILLIGRAM(S): at 23:24

## 2021-08-31 RX ADMIN — CHLORHEXIDINE GLUCONATE 15 MILLILITER(S): 213 SOLUTION TOPICAL at 05:26

## 2021-08-31 RX ADMIN — FENTANYL CITRATE 25 MICROGRAM(S): 50 INJECTION INTRAVENOUS at 01:29

## 2021-08-31 RX ADMIN — ATORVASTATIN CALCIUM 20 MILLIGRAM(S): 80 TABLET, FILM COATED ORAL at 23:23

## 2021-08-31 RX ADMIN — INSULIN HUMAN 6: 100 INJECTION, SOLUTION SUBCUTANEOUS at 06:19

## 2021-08-31 RX ADMIN — LACOSAMIDE 120 MILLIGRAM(S): 50 TABLET ORAL at 06:20

## 2021-08-31 RX ADMIN — CEFEPIME 100 MILLIGRAM(S): 1 INJECTION, POWDER, FOR SOLUTION INTRAMUSCULAR; INTRAVENOUS at 17:04

## 2021-08-31 RX ADMIN — PIPERACILLIN AND TAZOBACTAM 200 GRAM(S): 4; .5 INJECTION, POWDER, LYOPHILIZED, FOR SOLUTION INTRAVENOUS at 11:40

## 2021-08-31 RX ADMIN — Medication 3 MILLILITER(S): at 10:30

## 2021-08-31 RX ADMIN — SENNA PLUS 2 TABLET(S): 8.6 TABLET ORAL at 23:24

## 2021-08-31 RX ADMIN — HUMAN INSULIN 33 UNIT(S): 100 INJECTION, SUSPENSION SUBCUTANEOUS at 06:19

## 2021-08-31 RX ADMIN — CHLORHEXIDINE GLUCONATE 15 MILLILITER(S): 213 SOLUTION TOPICAL at 17:05

## 2021-08-31 RX ADMIN — Medication 250 MILLIGRAM(S): at 17:06

## 2021-08-31 RX ADMIN — FENTANYL CITRATE 25 MICROGRAM(S): 50 INJECTION INTRAVENOUS at 06:34

## 2021-08-31 RX ADMIN — CHLORHEXIDINE GLUCONATE 1 APPLICATION(S): 213 SOLUTION TOPICAL at 05:26

## 2021-08-31 RX ADMIN — LACOSAMIDE 120 MILLIGRAM(S): 50 TABLET ORAL at 18:05

## 2021-08-31 RX ADMIN — HUMAN INSULIN 25 UNIT(S): 100 INJECTION, SUSPENSION SUBCUTANEOUS at 12:50

## 2021-08-31 RX ADMIN — PANTOPRAZOLE SODIUM 40 MILLIGRAM(S): 20 TABLET, DELAYED RELEASE ORAL at 13:07

## 2021-08-31 RX ADMIN — CEFEPIME 100 MILLIGRAM(S): 1 INJECTION, POWDER, FOR SOLUTION INTRAMUSCULAR; INTRAVENOUS at 23:24

## 2021-08-31 RX ADMIN — Medication 3 MILLILITER(S): at 19:42

## 2021-08-31 RX ADMIN — SODIUM CHLORIDE 1000 MILLILITER(S): 9 INJECTION INTRAMUSCULAR; INTRAVENOUS; SUBCUTANEOUS at 08:00

## 2021-08-31 RX ADMIN — Medication 3 MILLILITER(S): at 14:21

## 2021-08-31 RX ADMIN — LEVETIRACETAM 400 MILLIGRAM(S): 250 TABLET, FILM COATED ORAL at 23:24

## 2021-08-31 RX ADMIN — Medication 81 MILLIGRAM(S): at 13:07

## 2021-08-31 RX ADMIN — Medication 650 MILLIGRAM(S): at 06:34

## 2021-08-31 RX ADMIN — SODIUM CHLORIDE 500 MILLILITER(S): 9 INJECTION INTRAMUSCULAR; INTRAVENOUS; SUBCUTANEOUS at 07:02

## 2021-08-31 RX ADMIN — LEVETIRACETAM 400 MILLIGRAM(S): 250 TABLET, FILM COATED ORAL at 05:25

## 2021-08-31 RX ADMIN — ALBUTEROL 2 PUFF(S): 90 AEROSOL, METERED ORAL at 02:57

## 2021-08-31 RX ADMIN — SODIUM CHLORIDE 75 MILLILITER(S): 9 INJECTION INTRAMUSCULAR; INTRAVENOUS; SUBCUTANEOUS at 13:08

## 2021-08-31 RX ADMIN — ENOXAPARIN SODIUM 40 MILLIGRAM(S): 100 INJECTION SUBCUTANEOUS at 11:41

## 2021-08-31 NOTE — SWALLOW BEDSIDE ASSESSMENT ADULT - PHARYNGEAL PHASE
+odynophagia/Delayed pharyngeal swallow/Wet vocal quality post oral intake/Cough post oral intake/Throat clear post oral intake/Multiple swallows +odynophagia/Multiple swallows

## 2021-08-31 NOTE — EEG REPORT - NS EEG TEXT BOX
Epilepsy Attending Note:     JOSÉ MANUEL PORTER    71y Male  MRN [id number]    Vital Signs Last 24 Hrs  T(C): 37.7 (31 Aug 2021 08:00), Max: 38.2 (31 Aug 2021 06:00)  T(F): 99.9 (31 Aug 2021 08:00), Max: 100.8 (31 Aug 2021 06:00)  HR: 94 (31 Aug 2021 08:00) (74 - 130)  BP: 114/45 (30 Aug 2021 18:00) (114/45 - 114/45)  BP(mean): 66 (30 Aug 2021 18:00) (66 - 66)  RR: 21 (31 Aug 2021 08:00) (17 - 32)  SpO2: 100% (31 Aug 2021 08:00) (82% - 100%)                          10.4   13.59 )-----------( 221      ( 31 Aug 2021 03:04 )             32.2       08-31    138  |  99  |  40<H>  ----------------------------<  253<H>  4.7   |  29  |  1.1    Ca    9.2      31 Aug 2021 03:04  Phos  4.6     08-31  Mg     2.4     08-31    TPro  5.4<L>  /  Alb  3.0<L>  /  TBili  0.3  /  DBili  x   /  AST  88<H>  /  ALT  111<H>  /  AlkPhos  207<H>  08-31      MEDICATIONS  (STANDING):  ALBUTerol    90 MICROgram(s) HFA Inhaler 2 Puff(s) Inhalation every 6 hours  aspirin  chewable 81 milliGRAM(s) Oral daily  atorvastatin 20 milliGRAM(s) Oral at bedtime  chlorhexidine 0.12% Liquid 15 milliLiter(s) Oral Mucosa every 12 hours  chlorhexidine 4% Liquid 1 Application(s) Topical <User Schedule>  doxazosin 2 milliGRAM(s) Oral at bedtime  enoxaparin Injectable 40 milliGRAM(s) SubCutaneous daily  insulin NPH human recombinant 35 Unit(s) SubCutaneous every 6 hours  insulin regular  human corrective regimen sliding scale   SubCutaneous every 4 hours  lacosamide IVPB 100 milliGRAM(s) IV Intermittent every 12 hours  levETIRAcetam  IVPB 1000 milliGRAM(s) IV Intermittent <User Schedule>  norepinephrine Infusion 0.05 MICROgram(s)/kG/Min (8.27 mL/Hr) IV Continuous <Continuous>  pantoprazole   Suspension 40 milliGRAM(s) Oral daily  senna 2 Tablet(s) Oral at bedtime  sodium chloride 0.9% Bolus 500 milliLiter(s) IV Bolus once    MEDICATIONS  (PRN):  acetaminophen   Tablet .. 650 milliGRAM(s) Oral every 6 hours PRN Temp greater or equal to 38C (100.4F)  fentaNYL    Injectable 25 MICROGram(s) IV Push every 2 hours PRN Agitation, vent dyssynchrony            VEEG in the last 24 hours:    Background---------------  very low amplitude  reaching frequencies in the range of low amplitude delta. It shows minimal  reactivity and evidence for eye opening and arousal    Focal and generalized slowing----------  moderate to severe generalized slowing. Mild to moderate bifrontal  slowing    Interictal activity-------------  rare bifrontal sharp waves    Events---------none    Seizures-----------  none    Impression:   abnormal due to the focal and generalized slowing and interictal as described above    Plan - As/NCC team

## 2021-08-31 NOTE — PHARMACOTHERAPY INTERVENTION NOTE - NSPHARMCOMMASP
ASP - Renal dose adjustment
ASP - Therapy recommended/ Alternative therapy
ASP - Dose optimization/Non-Renal dose adjustment

## 2021-08-31 NOTE — PROGRESS NOTE ADULT - ASSESSMENT
· Assessment	  71-year-old diabetic, admitted with HHS/DKA s/p clinical tonic-clonic seizure x2 qualifying for status epilepticus on keppra 3g/d and versed gtt, course c/b respiratory failure, distributive shock, encephalopathy, metabolic derangements, currently remains mechanically ventilated.    -status epilepticus  -HHS/DKA  -acute encephalopathy  -acute respiratory failure  - Transaminitis  - Leukocytosis    Plan:  Neurological:  - Neuro Checks Q4H  - Continue vEEG with stimulus induced triphasic morphology  - Video EEG, 8/30: moderate bifrontal focal slowing, moderate-severe generalized slowing, bifrontal/FP sharps  - Continue Keppra   - Continue Vimpat  - Seizure precautions   - Continue Thiamine 500 IV-->completed  - Daily Statin  - Continue ASA 81 mg daily  - PT/OT, splints on all four  - MR with small acute L occipital infarct, prior vessel imaging stable  - Precedex/Fentanyl IV prn for agitation    Cardiovascular:  - Titrate pressors to MAP >65  - TTE, 8/24, with normal BiV function  - ASA, statin  - D/C propranolol   - D/C Hydralazine  - home dose of ACE not restarted due to mild hyperkalemia     Pulmonary:  - Vent support, lung protective settings; PSV trials; will broach trach with family this week pending pt clinical status  - Etco2, Keep 35-45  - HOB 45  - Recommend Plateau pressure < 30 to maintain venous drainage  - CXR reviewed  - Chest PT/Pulmonary toilet- Albuterol/Mucomyst  - Wean Precedex to vent synchrony    GI/:  - Diet: Glucerna 1.2 continuous   - Bowel Regimen: Senna  - Strict Is/Os  - Basilio placed this morning keep for 24 hour and then continue serial straight cath   - Cardura started for urinary retention  - TOV tomorrow, 8/31  - PPI daily  - Last BM: 8/29  - Transaminitis, uptrending, Trend liver enzymes  - RUQ US  - Intra-renal TYLER uptrending (FeNa 2.4%) , trend BMP    Electrolytes:  - Replete as needed  - Keep normoglycemia  - hyperK s/p lokelma, resolved     Endo:  - a1c >15.5  - NPH increased to 33 units q6hrs w/ corrective sliding scale q 6  - FS q4hrs   - glucose goal at 140-180     ID:  - Trend Fever curve and WBC- Borderline febrile this AM  - New Lactate (2.1)  - Respiratory and Blood cx this AM  - Completed course of Rocephin  - Procalcitonin 8/30: 0.13  - monitor off ABTx  - F/U blood cultures, drawn 8/28  - U/A and sputum, 8/28, negative     Hematology:  - SCDs and SQ Lovenox    Code: Full  Dispo: ICU

## 2021-08-31 NOTE — SWALLOW BEDSIDE ASSESSMENT ADULT - SLP PERTINENT HISTORY OF CURRENT PROBLEM
71-year-old diabetic, admitted with HHS/DKA s/p clinical tonic-clonic seizure x2 qualifying for status epilepticus on keppra 3g/d and versed gtt, course c/b respiratory failure, distributive shock, encephalopathy, and metabolic derangements; Status epilepticus, likely due to osmotic shift in setting of hyperglycemia. pt s/p extubation earlier today 8/31. MRI 8/26-> Punctate acute infarct involving the left occipital cortex.

## 2021-08-31 NOTE — PROGRESS NOTE ADULT - ATTENDING COMMENTS
History, events, data and scans reviewed, patient examined at bedside. Assessment and plan of care outlined above was formulated/implemented under my supervision and approval.   Problems:   1) Status epilepticus, likely due to osmotic shift in setting of hyperglycemia: Clinically/electrographically controlled, maintain on current AEDs, d/c VEEG monitoring  2) Depressed level of consciousness: likely due to residual effect of recent midazolam infusion for status and now improving, continue to minimize sedation  3) On MV for airway protection during status: Now MS is improved, patient has good weening parameters, therefore successfully extubated in my presence and supervision. Chest PT/Incentive spirometry  4) Uncontrolled DM, high insulin requirement, will change NPH to lentus q HS, high correction sliding scale  5) Fever, minimal but purulent secretions: increased WBC, on emperic abx coverage for possible evolving VAP, follow pro-calcitonin/pancultures  6) Hypotension, single episode, likely due to hypovolemia : responded to volume repalcement, monitor i's/O's and maintain euvolemia  7) GI/DVT prophylaxis as above    Discussed with patient and his cousin at bedside.

## 2021-08-31 NOTE — SWALLOW BEDSIDE ASSESSMENT ADULT - SLP GENERAL OBSERVATIONS
pt received in bed awake alert some confusion w/o c/o pain. +4L NC; patient s/p chest PT w/ +audible congestion and productive cough pt received in bed awake alert some confusion w/o c/o pain. +4L NC; patient s/p chest PT w/ +audible congestion and productive cough; +dysphonia; daughters at bedside

## 2021-08-31 NOTE — PROGRESS NOTE ADULT - ASSESSMENT
71-year-old diabetic, admitted with HHS/DKA s/p clinical tonic-clonic seizure x2 qualifying for status epilepticus on keppra 3g/d and versed gtt, course c/b respiratory failure, distributive shock, encephalopathy, metabolic derangements, currently remains mechanically ventilated.    -status epilepticus  -HHS/DKA  -acute encephalopathy  -acute respiratory failure  - Transaminitis  - Leukocytosis    Plan:  Neurological:  - Neuro Checks Q4H  - Continue vEEG with stimulus induced triphasic morphology  - Video EEG, 8/30: moderate bifrontal focal slowing, moderate-severe generalized slowing, bifrontal/FP sharps  - Continue Keppra   - Continue Vimpat  - Seizure precautions   - Continue Thiamine 500 IV-->completed  - Daily Statin  - Continue ASA 81 mg daily  - PT/OT, splints on all four  - MR with small acute L occipital infarct, prior vessel imaging stable  - Precedex/Fentanyl IV prn for agitation    Cardiovascular:  - Titrate pressors to MAP >65  - TTE, 8/24, with normal BiV function  - ASA, statin  - D/C propranolol   - D/C Hydralazine  - home dose of ACE not restarted due to mild hyperkalemia     Pulmonary:  - Vent support, lung protective settings; PSV trials; will broach trach with family this week pending pt clinical status  - Etco2, Keep 35-45  - HOB 45  - Recommend Plateau pressure < 30 to maintain venous drainage  - CXR reviewed  - Chest PT/Pulmonary toilet- Albuterol/Mucomyst  - Wean Precedex to vent synchrony    GI/:  - Diet: Glucerna 1.2 continuous   - Bowel Regimen: Senna  - Strict Is/Os  - Basilio placed this morning keep for 24 hour and then continue serial straight cath   - Cardura started for urinary retention  - TOV tomorrow, 8/31  - PPI daily  - Last BM: 8/29  - Transaminitis, uptrending, Trend liver enzymes  - RUQ US  - Intra-renal TYLER uptrending (FeNa 2.4%) , trend BMP    Electrolytes:  - Replete as needed  - Keep normoglycemia  - hyperK s/p lokelma, resolved     Endo:  - a1c >15.5  - NPH increased to 33 units q6hrs w/ corrective sliding scale q 6  - FS q4hrs   - glucose goal at 140-180     ID:  - Trend Fever curve and WBC- Borderline febrile this AM  - New Lactate (2.1)  - Respiratory and Blood cx this AM  - Completed course of Rocephin  - Procalcitonin 8/30: 0.13  - monitor off ABTx  - F/U blood cultures, drawn 8/28  - U/A and sputum, 8/28, negative     Hematology:  - SCDs and SQ Lovenox    Code: Full  Dispo: ICU        Please call with any patient concerns or questions.  Amish Jaime NP  #[id number]     71-year-old diabetic, admitted with HHS/DKA s/p clinical tonic-clonic seizure x2 qualifying for status epilepticus on keppra 3g/d and versed gtt, course c/b respiratory failure, distributive shock, encephalopathy, metabolic derangements,     -status epilepticus  -HHS/DKA  -acute encephalopathy  -acute respiratory failure  - Transaminitis  - Leukocytosis    Plan:  Neurological:  - Neuro Checks Q4H  - Continue vEEG with stimulus induced triphasic morphology  - Video EEG, 8/30: moderate bifrontal focal slowing, moderate-severe generalized slowing, bifrontal/FP sharps  - Continue Keppra   - Continue Vimpat  - Seizure precautions   - Continue Thiamine 500 IV-->completed  - Daily Statin  - Continue ASA 81 mg daily  - PT/OT, splints on all four  - MR with small acute L occipital infarct, prior vessel imaging stable  - Precedex/Fentanyl IV prn for agitation    Cardiovascular:  - Titrate pressors to MAP >65  - TTE, 8/24, with normal BiV function  - ASA, statin  - D/C propranolol   - D/C Hydralazine  - home dose of ACE not restarted due to mild hyperkalemia     Pulmonary:  - Vent support, lung protective settings; PSV trials; will broach trach with family this week pending pt clinical status  - Etco2, Keep 35-45  - HOB 45  - Recommend Plateau pressure < 30 to maintain venous drainage  - CXR reviewed  - Chest PT/Pulmonary toilet- Albuterol/Mucomyst  - Wean Precedex to vent synchrony    GI/:  - Diet: Glucerna 1.2 continuous   - Bowel Regimen: Senna  - Strict Is/Os  - Basilio placed this morning keep for 24 hour and then continue serial straight cath   - Cardura started for urinary retention  - TOV tomorrow, 8/31  - PPI daily  - Last BM: 8/29  - Transaminitis, uptrending, Trend liver enzymes  - RUQ US  - Intra-renal TYLER uptrending (FeNa 2.4%) , trend BMP    Electrolytes:  - Replete as needed  - Keep normoglycemia  - hyperK s/p lokelma, resolved     Endo:  - a1c >15.5  - NPH increased to 33 units q6hrs w/ corrective sliding scale q 6  - FS q4hrs   - glucose goal at 140-180     ID:  - Trend Fever curve and WBC- Borderline febrile this AM  - New Lactate (2.1)  - Respiratory and Blood cx this AM  - Completed course of Rocephin  - Procalcitonin 8/30: 0.13  - monitor off ABTx  - F/U blood cultures, drawn 8/28  - U/A and sputum, 8/28, negative     Hematology:  - SCDs and SQ Lovenox    Code: Full  Dispo: ICU        Please call with any patient concerns or questions.  Amish Jaime NP  #[id number]     71-year-old diabetic, admitted with HHS/DKA s/p clinical tonic-clonic seizure x2 qualifying for status epilepticus on keppra 3g/d and versed gtt, course c/b respiratory failure, distributive shock, encephalopathy, metabolic derangements, extubated 8/31.     -status epilepticus  -HHS/DKA  -acute encephalopathy  -acute respiratory failure  - Transaminitis  - Leukocytosis    Plan:  Neurological:  - Neuro Checks Q2H  - Continue vEEG with stimulus induced triphasic morphology  - Video EEG, 8/30: moderate bifrontal focal slowing, moderate-severe generalized slowing, bifrontal/FP sharps, 8/31 rare bifrontal sharp waves  - Continue Keppra   - Continue Vimpat  - Seizure precautions   - Continue Thiamine 500 IV-->completed  - Daily Statin  - Continue ASA 81 mg daily  - PT/OT, splints on all four  - MR with small acute L occipital infarct, prior vessel imaging stable      Cardiovascular:  - Titrate pressors to MAP >65  - TTE, 8/24, with normal BiV function  - ASA, statin  - labetolol ivp prn sbp>160  - home dose of ACE not restarted due to mild hyperkalemia     Pulmonary:  - Extubated after successful PS trial   - HOB 45  - Chest pt  - incentive spironmeter   - duoneb and hypertonic inhalation q 4  - CXR, new LL opacity     GI/:  - NPO except meds, failed formal speech and swallow   - will plan for ngt and tube feeds   - Bowel Regimen: Senna  - Strict Is/Os  - Basilio discontinued, TOV   - Cardura started for urinary retention  - PPI daily  - Last BM: 8/29  - Transaminitis, uptrending, Trend liver enzymes  - RUQ US= normal   - Intra-renal TYLER uptrending (FeNa 2.4%) , trend BMP    Electrolytes:  - Replete as needed  - Keep normoglycemia  - hyperK s/p lokelma, resolved     Endo:  - a1c >15.5  - NPH 18:00   - sliding scale q 4 hours  - FS q4hrs   - lantus 50 units HS   - glucose goal at 140-180     ID:  - Trend Fever curve and WBC- Borderline febrile this AM  - New Lactate (2.1)  - Sputum and Blood cx this AM  - Completed course of Rocephin  - Procalcitonin 8/30: 0.13  - U/A  - Vanco and cefepime   - f/u vanco trough tomorrow pm, 1 hour before 4th dose     Hematology:  - SCDs and SQ Lovenox    Code: Full  Dispo: ICU      Please call with any patient concerns or questions.  Jovita Jim NP  #[id number]

## 2021-08-31 NOTE — PROGRESS NOTE ADULT - SUBJECTIVE AND OBJECTIVE BOX
SUMMARY: Brought in by EMS for AMS, R HP, and aphasia s/p seizure like activity at home w/ no reported seizure hx. On arrival to ED, stroke code called for NIHSS 9. x2 more witnessed seizures were noted along w/ a GTC seizure lasting >2 min. Responded to 2gr Ativan and loaded with Keppra 40 mg/kg. CTH unremarkable. CTA/CTP w/ no aneurysm/AVM/LVO. Patient noted to be in HHS w/ glucose >600. Admitted to MICU and started on an insulin gtt. Course c/b respiratory failure, distributive shock, encephalopathy, metabolic derangements. *[facility] to assume care as primary team (8/27) for continued status epilepticus management and given resolving metabolic abnormalities*    OVERNIGHT EVENTS: Remains intubated. On video EEG with no reports of clinical seizures. Required vasopressors secondary to sedation vs warm stage of sepsis. New leukocytosis, transaminitis worsening.     REVIEW OF SYSTEMS: [X] A ten-point ROS was otherwise negative except as noted in HPI    VITALS: [x] Reviewed    IMAGING/DATA: [x] Reviewed    IVF FLUIDS/MEDICATIONS: [x] Reviewed    No Known Allergies    DEVICES:   [] Restraints [] PIVs [x] ET tube [x] central line [] PICC [x] arterial line [x] lubin [x] NGT/OGT [] EVD [] LD [] EVANGELISTA/HMV [] LiCOX [] ICP monitor [] Trach [] PEG [] Chest Tube [] other:    EXAMINATION:  General: No acute distress  HEENT: Anicteric sclerae  Cardiac: [id number]  Lungs: Clear. Intubated   Abdomen: Soft, non-tender, +BS  Extremities: No c/c/e  Skin: Clean, dry and intact  Neurologic: Patient mechanically vented, sedated on Precedex @ 0.4mcg, opens eyes to voice and noxious stimuli, able to track to voice w/ notable left gaze preference. Follows simple commands (shows thumbs up). (+) corneal (+) blink to threat (+) cough/gag. Able to lift B/L UE AG and move B/L LE in plane of bed spontaneously, however, not reproducible w/ noxious stimuli Hyporeflexic. Sensation intact.     24 Hour Vital Signs:  T(C): 37.5 (08-31-21 @ 04:00), Max: 38 (08-30-21 @ 18:00)  T(F): 99.5 (08-31-21 @ 04:00), Max: 100.4 (08-30-21 @ 18:00)  HR: 116 (08-31-21 @ 05:00) (72 - 130)  BP: 114/45 (08-30-21 @ 18:00) (114/45 - 114/45)  ABP: 136/52 (08-31-21 @ 05:00) (86/30 - 212/82)  ABP(mean): 84 (08-31-21 @ 05:00) (48 - 134)  RR: 23 (08-31-21 @ 05:00) (18 - 32)  SpO2: 100% (08-31-21 @ 05:00) (82% - 100%)    I&O's Summary    29 Aug 2021 07:01  -  30 Aug 2021 07:00  --------------------------------------------------------  IN: 2499 mL / OUT: 1605 mL / NET: 894 mL    30 Aug 2021 07:01  -  31 Aug 2021 05:48  --------------------------------------------------------  IN: 2019.1 mL / OUT: 1230 mL / NET: 789.1 mL      Mode: AC/ CMV (Assist Control/ Continuous Mandatory Ventilation), RR (machine): 16, TV (machine): 420, FiO2: 30, PEEP: 5, ITime: 1, MAP: 10, PIP: 21    acetaminophen   Tablet .. 650 milliGRAM(s) Oral every 6 hours PRN  acetylcysteine 10%  Inhalation 3 milliLiter(s) Inhalation every 6 hours  ALBUTerol    90 MICROgram(s) HFA Inhaler 2 Puff(s) Inhalation every 6 hours  aspirin  chewable 81 milliGRAM(s) Oral daily  atorvastatin 20 milliGRAM(s) Oral at bedtime  chlorhexidine 0.12% Liquid 15 milliLiter(s) Oral Mucosa every 12 hours  chlorhexidine 4% Liquid 1 Application(s) Topical <User Schedule>  dexMEDEtomidine Infusion 0.2 MICROgram(s)/kG/Hr (4.41 mL/Hr) IV Continuous <Continuous>  dextrose 5%. 1000 milliLiter(s) (100 mL/Hr) IV Continuous <Continuous>  doxazosin 2 milliGRAM(s) Oral at bedtime  enoxaparin Injectable 40 milliGRAM(s) SubCutaneous daily  fentaNYL    Injectable 25 MICROGram(s) IV Push every 2 hours PRN  insulin NPH human recombinant 30 Unit(s) SubCutaneous every 6 hours  insulin regular  human corrective regimen sliding scale   SubCutaneous every 6 hours  lacosamide IVPB 100 milliGRAM(s) IV Intermittent every 12 hours  levETIRAcetam  IVPB 1000 milliGRAM(s) IV Intermittent <User Schedule>  pantoprazole   Suspension 40 milliGRAM(s) Oral daily  propranolol 10 milliGRAM(s) Oral every 8 hours  senna 2 Tablet(s) Oral at bedtime    LABS:                          10.4   13.59 )-----------( 221      ( 31 Aug 2021 03:04 )             32.2     08-31    138  |  99  |  40<H>  ----------------------------<  253<H>  4.7   |  29  |  1.1    Ca    9.2      31 Aug 2021 03:04  Phos  4.6     08-31  Mg     2.4     08-31    TPro  5.4<L>  /  Alb  3.0<L>  /  TBili  0.3  /  DBili  x   /  AST  88<H>  /  ALT  111<H>  /  AlkPhos  207<H>  08-31      LIVER FUNCTIONS - ( 29 Aug 2021 03:24 )  Alb: 2.7 g/dL / Pro: 4.9 g/dL / ALK PHOS: 123 U/L / ALT: 39 U/L / AST: 43 U/L / GGT: x           ABG - ( 29 Aug 2021 18:16 )  pH, Arterial: 7.44  pH, Blood: x     /  pCO2: 48    /  pO2: 94    / HCO3: 33    / Base Excess: 7.2   /  SaO2: 98.5

## 2021-08-31 NOTE — PROGRESS NOTE ADULT - SUBJECTIVE AND OBJECTIVE BOX
71yMale    HPI:  72 y/o M with pmh of dm, osteomyelitis, gerd, depression, hld, htn presenting to the ED for above cc.   the patient was doing last night until he was unable to talk and walk independently. As per his ex wife the right sided then started shaking. She called 911.   On presentation the patient had status epilepticus and he received 4 mg of lorazepam.   CT brain done and an acute stroke could not be excluded because of technical difficulties.  His blood glucose was found to be more than 600.   he is admitted for [facility]    the ex wife said that he has bipolar and he was on a medication, although this is not found in the chart. She also does not recall his meds  sp intubation for airway protection, on propofol, INSULI, 1/2 ns 150 cc/h (24 Aug 2021 04:34)      PAST MEDICAL & SURGICAL HISTORY:  DM (diabetes mellitus)  12/27/18 hgb aic  11.2    HTN (hypertension)    Dyslipidemia    Diabetic foot ulcer    Depression    GERD (gastroesophageal reflux disease)    Diabetes    Toe amputation status, right  (2008)    History of amputation of toe  LT -partial 1st toe        Last 24 hour updates:      Outside Neurologist:     Home Medications:  amoxicillin-clavulanate 875 mg-125 mg oral tablet: 1 tab(s) orally 2 times a day (12 Mar 2021 10:42)  benazepril 40 mg oral tablet: 1 tab(s) orally once a day (08 Mar 2021 20:15)  Crestor 10 mg oral tablet: 1 tab(s) orally once a day (at bedtime) (08 Mar 2021 20:15)  Janumet 50 mg-1000 mg oral tablet: 1 tab(s) orally 2 times a day (08 Mar 2021 20:15)  Lantus 100 units/mL subcutaneous solution: 30 unit(s) subcutaneous once a day in the morning (08 Mar 2021 20:15)  omeprazole 20 mg oral delayed release capsule: 1 cap(s) orally once a day (08 Mar 2021 20:15)  Trulicity Pen 0.75 mg/0.5 mL subcutaneous solution:  (08 Mar 2021 20:15)      ALBUTerol    90 MICROgram(s) HFA Inhaler 2 Puff(s) Inhalation every 6 hours  aspirin  chewable 81 milliGRAM(s) Oral daily  atorvastatin 20 milliGRAM(s) Oral at bedtime  chlorhexidine 0.12% Liquid 15 milliLiter(s) Oral Mucosa every 12 hours  chlorhexidine 4% Liquid 1 Application(s) Topical <User Schedule>  doxazosin 2 milliGRAM(s) Oral at bedtime  enoxaparin Injectable 40 milliGRAM(s) SubCutaneous daily  insulin NPH human recombinant 35 Unit(s) SubCutaneous every 6 hours  insulin regular  human corrective regimen sliding scale   SubCutaneous every 6 hours  lacosamide IVPB 100 milliGRAM(s) IV Intermittent every 12 hours  levETIRAcetam  IVPB 1000 milliGRAM(s) IV Intermittent <User Schedule>  norepinephrine Infusion 0.05 MICROgram(s)/kG/Min IV Continuous <Continuous>  pantoprazole   Suspension 40 milliGRAM(s) Oral daily  senna 2 Tablet(s) Oral at bedtime  sodium chloride 0.9% Bolus 500 milliLiter(s) IV Bolus once  sodium chloride 0.9% Bolus 500 milliLiter(s) IV Bolus once  sodium chloride 0.9% Bolus 1000 milliLiter(s) IV Bolus once            T(F): 100.8 (08-31-21 @ 06:00), Max: 100.8 (08-31-21 @ 06:00)  HR: 118 (08-31-21 @ 07:00) (74 - 130)  BP: 114/45 (08-30-21 @ 18:00) (114/45 - 114/45)  RR: 23 (08-31-21 @ 07:00) (17 - 32)  SpO2: 100% (08-31-21 @ 07:00) (82% - 100%)                        10.4   13.59 )-----------( 221      ( 31 Aug 2021 03:04 )             32.2     08-31    138  |  99  |  40<H>  ----------------------------<  253<H>  4.7   |  29  |  1.1    Ca    9.2      31 Aug 2021 03:04  Phos  4.6     08-31  Mg     2.4     08-31    TPro  5.4<L>  /  Alb  3.0<L>  /  TBili  0.3  /  DBili  x   /  AST  88<H>  /  ALT  111<H>  /  AlkPhos  207<H>  08-31        LIVER FUNCTIONS - ( 31 Aug 2021 03:04 )  Alb: 3.0 g/dL / Pro: 5.4 g/dL / ALK PHOS: 207 U/L / ALT: 111 U/L / AST: 88 U/L / GGT: x                     I&O's Detail    30 Aug 2021 07:01  -  31 Aug 2021 07:00  --------------------------------------------------------  IN:    Dexmedetomidine: 256.4 mL    Glucerna: 1680 mL    IV PiggyBack: 300 mL    Norepinephrine: 115.6 mL  Total IN: 2352 mL    OUT:    Indwelling Catheter - Urethral (mL): 1455 mL  Total OUT: 1455 mL    Total NET: 897 mL          NIHSS:    Neuro Exam:    Neurological:  Mental Status: Alert and Oriented to person, place, and time, cooperative, pleasant. Affect appropriate, thought, speech, and language coherent. Short- and long-term memory, abstract thinking and calculation intact.  Cranial Nerves:  I: intact  II, III, IV, VI: PERRLA, EOM intact. Linder intact by confrontation. No cranial nerve palsy or nystagmus noted.  V: facial sensation intact; masseter/ temporal muscles intact, corneal reflex intact bilaterally  VII: face symmetrical at rest and with expression  VIII: intact to finger rub in the right ear and left ear  IX and X: no hoarseness, gag intact, palate/ uvula rise symmetrically  XI: SCM/ trapezius strength intact bilateral  XII: no dysarthria, tongue weakness/fasiculation   Motor: Normal muscle bulk/tone. Good posture. Strength 5+/5+ upper & lower extremities  Sensory: Sensation to light touch, pain, vibration, proprioception, and stereognosis intact to trunk and bilaterally to both upper and lower extremities. Normal two point discrimination.   Cerebellar Function: Normal gait including tandem, heel and toe walking. Romberg and pronator drift negative. Normal rapid alternating movements and point to point test.  Reflexes: No clonus      EVD ICP:             Level(cm):          24hr(ml):                             CSF:   W:      R:     C:    P:     G:     LA:      Last CTH:    Last CTA/MRA:    Last CTP:    Last MRI:    Last EEG:    Last Echo:        ABG:ABG - ( 31 Aug 2021 03:04 )  pH, Arterial: 7.47  pH, Blood: x     /  pCO2: 41    /  pO2: 114   / HCO3: 30    / Base Excess: 5.6   /  SaO2: 99.1                  Mode: AC/ CMV (Assist Control/ Continuous Mandatory Ventilation)  RR (machine): 16  TV (machine): 420  FiO2: 40  PEEP: 5  ITime: 1  MAP: 9  PIP: 24        Prophalaxis:     GI:   DVT:

## 2021-09-01 LAB
ALBUMIN SERPL ELPH-MCNC: 2.9 G/DL — LOW (ref 3.5–5.2)
ALP SERPL-CCNC: 169 U/L — HIGH (ref 30–115)
ALT FLD-CCNC: 66 U/L — HIGH (ref 0–41)
AMPHET UR-MCNC: NEGATIVE — SIGNIFICANT CHANGE UP
ANION GAP SERPL CALC-SCNC: 9 MMOL/L — SIGNIFICANT CHANGE UP (ref 7–14)
APTT BLD: 31.8 SEC — SIGNIFICANT CHANGE UP (ref 27–39.2)
AST SERPL-CCNC: 31 U/L — SIGNIFICANT CHANGE UP (ref 0–41)
BARBITURATES, URINE.: NEGATIVE — SIGNIFICANT CHANGE UP
BENZODIAZ UR-MCNC: SIGNIFICANT CHANGE UP
BILIRUB SERPL-MCNC: 0.4 MG/DL — SIGNIFICANT CHANGE UP (ref 0.2–1.2)
BUN SERPL-MCNC: 26 MG/DL — HIGH (ref 10–20)
CALCIUM SERPL-MCNC: 8.5 MG/DL — SIGNIFICANT CHANGE UP (ref 8.5–10.1)
CHLORIDE SERPL-SCNC: 104 MMOL/L — SIGNIFICANT CHANGE UP (ref 98–110)
CO2 SERPL-SCNC: 27 MMOL/L — SIGNIFICANT CHANGE UP (ref 17–32)
COCAINE METAB.OTHER UR-MCNC: NEGATIVE — SIGNIFICANT CHANGE UP
CREAT SERPL-MCNC: 0.8 MG/DL — SIGNIFICANT CHANGE UP (ref 0.7–1.5)
CREATININE, URINE THERAPEUTIC: 72.4 MG/DL — SIGNIFICANT CHANGE UP
GLUCOSE BLDC GLUCOMTR-MCNC: 108 MG/DL — HIGH (ref 70–99)
GLUCOSE BLDC GLUCOMTR-MCNC: 205 MG/DL — HIGH (ref 70–99)
GLUCOSE BLDC GLUCOMTR-MCNC: 239 MG/DL — HIGH (ref 70–99)
GLUCOSE BLDC GLUCOMTR-MCNC: 247 MG/DL — HIGH (ref 70–99)
GLUCOSE BLDC GLUCOMTR-MCNC: 259 MG/DL — HIGH (ref 70–99)
GLUCOSE BLDC GLUCOMTR-MCNC: 347 MG/DL — HIGH (ref 70–99)
GLUCOSE SERPL-MCNC: 219 MG/DL — HIGH (ref 70–99)
HCT VFR BLD CALC: 30.2 % — LOW (ref 42–52)
HGB BLD-MCNC: 9.5 G/DL — LOW (ref 14–18)
INR BLD: 1.5 RATIO — HIGH (ref 0.65–1.3)
MAGNESIUM SERPL-MCNC: 2.2 MG/DL — SIGNIFICANT CHANGE UP (ref 1.8–2.4)
MCHC RBC-ENTMCNC: 28.7 PG — SIGNIFICANT CHANGE UP (ref 27–31)
MCHC RBC-ENTMCNC: 31.5 G/DL — LOW (ref 32–37)
MCV RBC AUTO: 91.2 FL — SIGNIFICANT CHANGE UP (ref 80–94)
METHADONE UR-MCNC: NEGATIVE — SIGNIFICANT CHANGE UP
METHAQUALONE UR QL: NEGATIVE — SIGNIFICANT CHANGE UP
METHAQUALONE UR-MCNC: NEGATIVE — SIGNIFICANT CHANGE UP
NRBC # BLD: 0 /100 WBCS — SIGNIFICANT CHANGE UP (ref 0–0)
OPIATES UR-MCNC: NEGATIVE — SIGNIFICANT CHANGE UP
PCP UR-MCNC: NEGATIVE — SIGNIFICANT CHANGE UP
PHOSPHATE SERPL-MCNC: 3.5 MG/DL — SIGNIFICANT CHANGE UP (ref 2.1–4.9)
PLATELET # BLD AUTO: 229 K/UL — SIGNIFICANT CHANGE UP (ref 130–400)
POTASSIUM SERPL-MCNC: 4.3 MMOL/L — SIGNIFICANT CHANGE UP (ref 3.5–5)
POTASSIUM SERPL-SCNC: 4.3 MMOL/L — SIGNIFICANT CHANGE UP (ref 3.5–5)
PROCALCITONIN SERPL-MCNC: 0.24 NG/ML — HIGH (ref 0.02–0.1)
PROCALCITONIN SERPL-MCNC: 0.25 NG/ML — HIGH (ref 0.02–0.1)
PROPOXYPH UR QL: NEGATIVE — SIGNIFICANT CHANGE UP
PROT SERPL-MCNC: 5.1 G/DL — LOW (ref 6–8)
PROTHROM AB SERPL-ACNC: 17.2 SEC — HIGH (ref 9.95–12.87)
RBC # BLD: 3.31 M/UL — LOW (ref 4.7–6.1)
RBC # FLD: 13.8 % — SIGNIFICANT CHANGE UP (ref 11.5–14.5)
SODIUM SERPL-SCNC: 140 MMOL/L — SIGNIFICANT CHANGE UP (ref 135–146)
THC UR QL: NEGATIVE — SIGNIFICANT CHANGE UP
VANCOMYCIN TROUGH SERPL-MCNC: 7.4 UG/ML — SIGNIFICANT CHANGE UP (ref 5–10)
WBC # BLD: 13.34 K/UL — HIGH (ref 4.8–10.8)
WBC # FLD AUTO: 13.34 K/UL — HIGH (ref 4.8–10.8)

## 2021-09-01 PROCEDURE — 99232 SBSQ HOSP IP/OBS MODERATE 35: CPT

## 2021-09-01 PROCEDURE — 71045 X-RAY EXAM CHEST 1 VIEW: CPT | Mod: 26

## 2021-09-01 RX ORDER — LABETALOL HCL 100 MG
10 TABLET ORAL ONCE
Refills: 0 | Status: COMPLETED | OUTPATIENT
Start: 2021-09-01 | End: 2021-09-01

## 2021-09-01 RX ORDER — INSULIN GLARGINE 100 [IU]/ML
20 INJECTION, SOLUTION SUBCUTANEOUS ONCE
Refills: 0 | Status: COMPLETED | OUTPATIENT
Start: 2021-09-01 | End: 2021-09-01

## 2021-09-01 RX ORDER — SODIUM CHLORIDE 9 MG/ML
1000 INJECTION INTRAMUSCULAR; INTRAVENOUS; SUBCUTANEOUS
Refills: 0 | Status: DISCONTINUED | OUTPATIENT
Start: 2021-09-01 | End: 2021-09-02

## 2021-09-01 RX ORDER — LISINOPRIL 2.5 MG/1
10 TABLET ORAL DAILY
Refills: 0 | Status: DISCONTINUED | OUTPATIENT
Start: 2021-09-01 | End: 2021-09-09

## 2021-09-01 RX ORDER — HUMAN INSULIN 100 [IU]/ML
30 INJECTION, SUSPENSION SUBCUTANEOUS ONCE
Refills: 0 | Status: COMPLETED | OUTPATIENT
Start: 2021-09-01 | End: 2021-09-01

## 2021-09-01 RX ORDER — AZTREONAM 2 G
1000 VIAL (EA) INJECTION EVERY 8 HOURS
Refills: 0 | Status: DISCONTINUED | OUTPATIENT
Start: 2021-09-01 | End: 2021-09-07

## 2021-09-01 RX ORDER — DOXAZOSIN MESYLATE 4 MG
4 TABLET ORAL AT BEDTIME
Refills: 0 | Status: DISCONTINUED | OUTPATIENT
Start: 2021-09-01 | End: 2021-09-09

## 2021-09-01 RX ADMIN — LACOSAMIDE 120 MILLIGRAM(S): 50 TABLET ORAL at 07:14

## 2021-09-01 RX ADMIN — Medication 4 MILLIGRAM(S): at 21:41

## 2021-09-01 RX ADMIN — LACOSAMIDE 120 MILLIGRAM(S): 50 TABLET ORAL at 17:35

## 2021-09-01 RX ADMIN — Medication 81 MILLIGRAM(S): at 13:38

## 2021-09-01 RX ADMIN — ENOXAPARIN SODIUM 40 MILLIGRAM(S): 100 INJECTION SUBCUTANEOUS at 15:04

## 2021-09-01 RX ADMIN — CEFEPIME 100 MILLIGRAM(S): 1 INJECTION, POWDER, FOR SOLUTION INTRAMUSCULAR; INTRAVENOUS at 07:14

## 2021-09-01 RX ADMIN — Medication 3 MILLILITER(S): at 21:11

## 2021-09-01 RX ADMIN — Medication 3 MILLILITER(S): at 13:18

## 2021-09-01 RX ADMIN — INSULIN HUMAN 4: 100 INJECTION, SOLUTION SUBCUTANEOUS at 03:15

## 2021-09-01 RX ADMIN — LEVETIRACETAM 400 MILLIGRAM(S): 250 TABLET, FILM COATED ORAL at 06:21

## 2021-09-01 RX ADMIN — LEVETIRACETAM 400 MILLIGRAM(S): 250 TABLET, FILM COATED ORAL at 15:10

## 2021-09-01 RX ADMIN — SODIUM CHLORIDE 3 MILLILITER(S): 9 INJECTION INTRAMUSCULAR; INTRAVENOUS; SUBCUTANEOUS at 21:11

## 2021-09-01 RX ADMIN — LISINOPRIL 10 MILLIGRAM(S): 2.5 TABLET ORAL at 15:11

## 2021-09-01 RX ADMIN — INSULIN HUMAN 8: 100 INJECTION, SOLUTION SUBCUTANEOUS at 15:08

## 2021-09-01 RX ADMIN — Medication 3 MILLILITER(S): at 07:46

## 2021-09-01 RX ADMIN — PANTOPRAZOLE SODIUM 40 MILLIGRAM(S): 20 TABLET, DELAYED RELEASE ORAL at 15:10

## 2021-09-01 RX ADMIN — CHLORHEXIDINE GLUCONATE 1 APPLICATION(S): 213 SOLUTION TOPICAL at 06:21

## 2021-09-01 RX ADMIN — Medication 250 MILLIGRAM(S): at 19:59

## 2021-09-01 RX ADMIN — Medication 50 MILLIGRAM(S): at 15:07

## 2021-09-01 RX ADMIN — INSULIN HUMAN 4: 100 INJECTION, SOLUTION SUBCUTANEOUS at 06:36

## 2021-09-01 RX ADMIN — Medication 250 MILLIGRAM(S): at 06:22

## 2021-09-01 RX ADMIN — Medication 50 MILLIGRAM(S): at 22:44

## 2021-09-01 RX ADMIN — SENNA PLUS 2 TABLET(S): 8.6 TABLET ORAL at 21:42

## 2021-09-01 RX ADMIN — SODIUM CHLORIDE 3 MILLILITER(S): 9 INJECTION INTRAMUSCULAR; INTRAVENOUS; SUBCUTANEOUS at 07:45

## 2021-09-01 RX ADMIN — LEVETIRACETAM 400 MILLIGRAM(S): 250 TABLET, FILM COATED ORAL at 22:43

## 2021-09-01 RX ADMIN — ATORVASTATIN CALCIUM 20 MILLIGRAM(S): 80 TABLET, FILM COATED ORAL at 21:42

## 2021-09-01 RX ADMIN — INSULIN HUMAN 6: 100 INJECTION, SOLUTION SUBCUTANEOUS at 17:34

## 2021-09-01 RX ADMIN — Medication 10 MILLIGRAM(S): at 03:59

## 2021-09-01 RX ADMIN — HUMAN INSULIN 30 UNIT(S): 100 INJECTION, SUSPENSION SUBCUTANEOUS at 13:38

## 2021-09-01 RX ADMIN — Medication 3 MILLILITER(S): at 02:50

## 2021-09-01 RX ADMIN — SODIUM CHLORIDE 3 MILLILITER(S): 9 INJECTION INTRAMUSCULAR; INTRAVENOUS; SUBCUTANEOUS at 13:18

## 2021-09-01 RX ADMIN — INSULIN HUMAN 4: 100 INJECTION, SOLUTION SUBCUTANEOUS at 12:15

## 2021-09-01 NOTE — PROGRESS NOTE ADULT - SUBJECTIVE AND OBJECTIVE BOX
SUMMARY: Brought in by EMS for AMS, R HP, and aphasia s/p seizure like activity at home w/ no reported seizure hx. On arrival to ED, stroke code called for NIHSS 9. x2 more witnessed seizures were noted along w/ a GTC seizure lasting >2 min. Responded to 2gr Ativan and loaded with Keppra 40 mg/kg. CTH unremarkable. CTA/CTP w/ no aneurysm/AVM/LVO. Patient noted to be in HHS w/ glucose >600. Admitted to MICU and started on an insulin gtt. Course c/b respiratory failure, distributive shock, encephalopathy, metabolic derangements. *NCCU to assume care as primary team () for continued status epilepticus management and given resolving metabolic abnormalities*    OVERNIGHT EVENTS: Video EEG D/C, no clinical seizures appreciated. Restarted enteral tube feeds 2/2 failed speech/swallow evaluation. Low-grade temp, Tmax 99.9, F/U cultures. No acute distress noted. Remains extubated on 4LNC, SaO2 100%. Failed TOV, lubin catheter re-inserted, increased dose Cardura    REVIEW OF SYSTEMS: [X] A ten-point ROS was otherwise negative except as noted in HPI    VITALS: [x] Reviewed    IMAGING/DATA: [x] Reviewed    IVF FLUIDS/MEDICATIONS: [x] Reviewed    No Known Allergies    DEVICES:   [X] Restraints, B/L unsecured mittens [] PIVs [] ET tube [] central line [] PICC [x] arterial line [x] lubin [x] NGT/OGT [] EVD [] LD [] EVANGELISTA/HMV [] LiCOX [] ICP monitor [] Trach [] PEG [] Chest Tube [] other:    EXAMINATION:  General: No acute distress  HEENT: Anicteric sclerae  Cardiac: [id number], sinus tachycardia   Lungs: Clear. Intubated   Abdomen: Soft, non-tender, +BS  Extremities: No c/c/e  Skin: Clean, dry and intact  Neurologic:  Mental status: Awake, alert, oriented to person, however disoriented to place and time. Able to name objects, follows commands. Attention/concentration intact. Mild dysarthria, no aphasia.  Cranial Nerves:  II: Visual fields are full to confrontation. Pupils equally round and reactive to light, 3mm sluggish bilaterally  III, IV, VI: Left gaze preference that can be overcome. EOMI without nystagmus  V:  V1-V3 sensation intact bilaterally   VII: No facial droop, face is symmetric with normal eye closure and smile, no loss of nasolabial folds  XII: Tongue midline with no deviation   Motor: Normal bulk and tone. Able to lift B/U UE and LE antigravity, drift, hit bed. Generalized weakness B/L UE/LE  Sensation: Intact to light touch. No neglect.    ICU Vital Signs Last 24 Hrs:  T(C): 37.6 (01 Sep 2021 00:00), Max: 38.2 (31 Aug 2021 06:00)  T(F): 99.7 (01 Sep 2021 00:00), Max: 100.8 (31 Aug 2021 06:00)  HR: 116 (01 Sep 2021 03:00) (94 - 128)  BP: 172/65 (31 Aug 2021 17:00) (125/72 - 179/86)  BP(mean): 96 (31 Aug 2021 17:00) (88 - 126)  ABP: 168/62 (01 Sep 2021 03:00) (96/40 - 192/66)  ABP(mean): 106 (01 Sep 2021 03:00) (58 - 112)  RR: 22 (01 Sep 2021 03:00) (16 - 29)  SpO2: 100% (01 Sep 2021 03:00) (100% - 100%)    I&O's Detail:  30 Aug 2021 07:01  -  31 Aug 2021 07:00  --------------------------------------------------------  IN:    Dexmedetomidine: 256.4 mL    Glucerna: 1680 mL    IV PiggyBack: 300 mL    Norepinephrine: 115.6 mL  Total IN: 2352 mL    OUT:    Indwelling Catheter - Urethral (mL): 1455 mL  Total OUT: 1455 mL    Total NET: 897 mL    31 Aug 2021 07:01  -  01 Sep 2021 03:24  --------------------------------------------------------  IN:    Enteral Tube Flush: 150 mL    Glucerna: 240 mL    IV PiggyBack: 700 mL    Oral Fluid: 120 mL    sodium chloride 0.9%: 1125 mL    Sodium Chloride 0.9% Bolus: 500 mL  Total IN: 2835 mL    OUT:    Dexmedetomidine: 0 mL    Indwelling Catheter - Urethral (mL): 940 mL    Intermittent Catheterization - Urethral (mL): 1050 mL    Norepinephrine: 0 mL  Total OUT: 1990 mL    Total NET: 845 mL    Labs:      141  |  103  |  40<H>  ----------------------------<  232<H>  4.6   |  28  |  1.1    Ca    8.7      31 Aug 2021 08:31  Phos  4.6       Mg     2.4         TPro  5.1<L>  /  Alb  2.7<L>  /  TBili  0.2  /  DBili  x   /  AST  59<H>  /  ALT  94<H>  /  AlkPhos  178<H>                            10.4   13.59 )-----------( 221      ( 31 Aug 2021 03:04 )             32.2     LIVER FUNCTIONS - ( 31 Aug 2021 08:31 )  Alb: 2.7 g/dL / Pro: 5.1 g/dL / ALK PHOS: 178 U/L / ALT: 94 U/L / AST: 59 U/L / GGT: x           ABG - ( 31 Aug 2021 10:59 )  pH, Arterial: 7.45  pH, Blood: x     /  pCO2: 44    /  pO2: 75    / HCO3: 31    / Base Excess: 5.8   /  SaO2: 96.9      Microbiology:  Culture - Sputum (collected 31 Aug 2021 06:30)  Source: .Sputum Sputum  Gram Stain (31 Aug 2021 22:10):    Numerous polymorphonuclear leukocytes per low power field    Rare Squamous epithelial cells per low power field    Numerous Gram positive cocci in pairs per oil power field    Rare Gram Negative Rods per oil power field    Culture - Sputum (collected 29 Aug 2021 03:25)  Source: .Sputum Sputum trap  Gram Stain (29 Aug 2021 14:18):    Moderate polymorphonuclear leukocytes per low power field    No Squamous epithelial cells per low power field    Rare Gram Negative Rods per oil power field  Final Report (31 Aug 2021 10:13):    Normal Respiratory Chelsy present    Urinalysis Basic - ( 31 Aug 2021 16:16 )  Color: Light Yellow / Appearance: Clear / S.020 / pH: x  Gluc: x / Ketone: Negative  / Bili: Negative / Urobili: <2 mg/dL   Blood: x / Protein: Trace / Nitrite: Negative   Leuk Esterase: Negative / RBC: x / WBC x   Sq Epi: x / Non Sq Epi: x / Bacteria: x  Culture - Blood (21 @ 20:29): Specimen Source: .Blood None; Culture Results: No growth to date.   Medications Current and PRN:  MEDICATIONS  (STANDING):  albuterol/ipratropium for Nebulization 3 milliLiter(s) Nebulizer every 6 hours  aspirin  chewable 81 milliGRAM(s) Oral daily  atorvastatin 20 milliGRAM(s) Oral at bedtime  cefepime   IVPB 2000 milliGRAM(s) IV Intermittent every 8 hours  cefepime   IVPB      chlorhexidine 0.12% Liquid 15 milliLiter(s) Oral Mucosa every 12 hours  chlorhexidine 4% Liquid 1 Application(s) Topical <User Schedule>  doxazosin 4 milliGRAM(s) Oral at bedtime  enoxaparin Injectable 40 milliGRAM(s) SubCutaneous daily  insulin regular  human corrective regimen sliding scale   SubCutaneous every 4 hours  lacosamide IVPB 100 milliGRAM(s) IV Intermittent every 12 hours  levETIRAcetam  IVPB 1000 milliGRAM(s) IV Intermittent <User Schedule>  pantoprazole   Suspension 40 milliGRAM(s) Oral daily  senna 2 Tablet(s) Oral at bedtime  sodium chloride 0.9%. 1000 milliLiter(s) (75 mL/Hr) IV Continuous <Continuous>  sodium chloride 3%  Inhalation 3 milliLiter(s) Inhalation every 6 hours  vancomycin  IVPB      vancomycin  IVPB 1000 milliGRAM(s) IV Intermittent two times a day    MEDICATIONS  (PRN):  acetaminophen   Tablet .. 650 milliGRAM(s) Oral every 6 hours PRN Temp greater or equal to 38C (100.4F)  labetalol Injectable 10 milliGRAM(s) IV Push every 6 hours PRN Systolic blood pressure >    Imaging:  EXAM:  CT BRAIN (2021):    IMPRESSION:  In comparison with the prior CT scan of the brain dated 2021:  The current examination is limited due to streak artifact from the EEG leads.  Otherwise stable examination.  Please see the MRI of the brain report dated 2021 for further information.   SUMMARY: Brought in by EMS for AMS, R HP, and aphasia s/p seizure like activity at home w/ no reported seizure hx. On arrival to ED, stroke code called for NIHSS 9. x2 more witnessed seizures were noted along w/ a GTC seizure lasting >2 min. Responded to 2gr Ativan and loaded with Keppra 40 mg/kg. CTH unremarkable. CTA/CTP w/ no aneurysm/AVM/LVO. Patient noted to be in HHS w/ glucose >600. Admitted to MICU and started on an insulin gtt. Course c/b respiratory failure, distributive shock, encephalopathy, metabolic derangements. *NCCU to assume care as primary team () for continued status epilepticus management and given resolving metabolic abnormalities*    OVERNIGHT EVENTS: Video EEG D/C, no clinical seizures appreciated. Restarted enteral tube feeds 2/2 failed speech/swallow evaluation. Low-grade temp, Tmax 99.9, F/U cultures. No acute distress noted. Remains extubated on 4LNC, SaO2 100%. Failed TOV, lubin catheter re-inserted, increased dose Cardura    REVIEW OF SYSTEMS: [X] A ten-point ROS was otherwise negative except as noted in HPI    VITALS: [x] Reviewed    IMAGING/DATA: [x] Reviewed    IVF FLUIDS/MEDICATIONS: [x] Reviewed    No Known Allergies    DEVICES:   [X] Restraints [x] PIVs [] ET tube [] central line [] PICC [x] arterial line [x] lubin [x] NGT/OGT [] EVD [] LD [] EVANGELISTA/HMV [] LiCOX [] ICP monitor [] Trach [] PEG [] Chest Tube [] other:    EXAMINATION:  General: No acute distress  HEENT: Anicteric sclerae  Cardiac: [id number], sinus tachycardia   Lungs: Clear. Intubated   Abdomen: Soft, non-tender, +BS  Extremities: No c/c/e  Skin: Clean, dry and intact  Neurologic:  Mental status: Awake, alert, oriented to person, place however disoriented to time. Able to name objects, follows commands. Attention/concentration intact. No aphasia.  Cranial Nerves:  II: Visual fields are full to confrontation. Pupils equally round and reactive to light, 3mm sluggish bilaterally  III, IV, VI: No gaze preference. EOMI without nystagmus  V:  V1-V3 sensation intact bilaterally   VII: No facial droop, face is symmetric with normal eye closure and smile, no loss of nasolabial folds  XII: Tongue midline with no deviation   Motor: Normal bulk and tone. Able to lift B/U UE and LE antigravity, no drift noted   Sensation: Intact to light touch. No neglect.    ICU Vital Signs Last 24 Hrs:  T(C): 37.6 (01 Sep 2021 00:00), Max: 38.2 (31 Aug 2021 06:00)  T(F): 99.7 (01 Sep 2021 00:00), Max: 100.8 (31 Aug 2021 06:00)  HR: 116 (01 Sep 2021 03:00) (94 - 128)  BP: 172/65 (31 Aug 2021 17:00) (125/72 - 179/86)  BP(mean): 96 (31 Aug 2021 17:00) (88 - 126)  ABP: 168/62 (01 Sep 2021 03:00) (96/40 - 192/66)  ABP(mean): 106 (01 Sep 2021 03:) (58 - 112)  RR: 22 (01 Sep 2021 03:00) (16 - 29)  SpO2: 100% (01 Sep 2021 03:00) (100% - 100%)    I&O's Detail:  30 Aug 2021 07:01  -  31 Aug 2021 07:00  --------------------------------------------------------  IN:    Dexmedetomidine: 256.4 mL    Glucerna: 1680 mL    IV PiggyBack: 300 mL    Norepinephrine: 115.6 mL  Total IN: 2352 mL    OUT:    Indwelling Catheter - Urethral (mL): 1455 mL  Total OUT: 1455 mL    Total NET: 897 mL    31 Aug 2021 07:01  -  01 Sep 2021 03:24  --------------------------------------------------------  IN:    Enteral Tube Flush: 150 mL    Glucerna: 240 mL    IV PiggyBack: 700 mL    Oral Fluid: 120 mL    sodium chloride 0.9%: 1125 mL    Sodium Chloride 0.9% Bolus: 500 mL  Total IN: 2835 mL    OUT:    Dexmedetomidine: 0 mL    Indwelling Catheter - Urethral (mL): 940 mL    Intermittent Catheterization - Urethral (mL): 1050 mL    Norepinephrine: 0 mL  Total OUT: 1990 mL    Total NET: 845 mL    Labs:      141  |  103  |  40<H>  ----------------------------<  232<H>  4.6   |  28  |  1.1    Ca    8.7      31 Aug 2021 08:31  Phos  4.6       Mg     2.4         TPro  5.1<L>  /  Alb  2.7<L>  /  TBili  0.2  /  DBili  x   /  AST  59<H>  /  ALT  94<H>  /  AlkPhos  178<H>                            10.4   13.59 )-----------( 221      ( 31 Aug 2021 03:04 )             32.2     LIVER FUNCTIONS - ( 31 Aug 2021 08:31 )  Alb: 2.7 g/dL / Pro: 5.1 g/dL / ALK PHOS: 178 U/L / ALT: 94 U/L / AST: 59 U/L / GGT: x           ABG - ( 31 Aug 2021 10:59 )  pH, Arterial: 7.45  pH, Blood: x     /  pCO2: 44    /  pO2: 75    / HCO3: 31    / Base Excess: 5.8   /  SaO2: 96.9      Microbiology:  Culture - Sputum (collected 31 Aug 2021 06:30)  Source: .Sputum Sputum  Gram Stain (31 Aug 2021 22:10):    Numerous polymorphonuclear leukocytes per low power field    Rare Squamous epithelial cells per low power field    Numerous Gram positive cocci in pairs per oil power field    Rare Gram Negative Rods per oil power field    Culture - Sputum (collected 29 Aug 2021 03:25)  Source: .Sputum Sputum trap  Gram Stain (29 Aug 2021 14:18):    Moderate polymorphonuclear leukocytes per low power field    No Squamous epithelial cells per low power field    Rare Gram Negative Rods per oil power field  Final Report (31 Aug 2021 10:13):    Normal Respiratory Chelsy present    Urinalysis Basic - ( 31 Aug 2021 16:16 )  Color: Light Yellow / Appearance: Clear / S.020 / pH: x  Gluc: x / Ketone: Negative  / Bili: Negative / Urobili: <2 mg/dL   Blood: x / Protein: Trace / Nitrite: Negative   Leuk Esterase: Negative / RBC: x / WBC x   Sq Epi: x / Non Sq Epi: x / Bacteria: x  Culture - Blood (21 @ 20:29): Specimen Source: .Blood None; Culture Results: No growth to date.   Medications Current and PRN:  MEDICATIONS  (STANDING):  albuterol/ipratropium for Nebulization 3 milliLiter(s) Nebulizer every 6 hours  aspirin  chewable 81 milliGRAM(s) Oral daily  atorvastatin 20 milliGRAM(s) Oral at bedtime  cefepime   IVPB 2000 milliGRAM(s) IV Intermittent every 8 hours  cefepime   IVPB      chlorhexidine 0.12% Liquid 15 milliLiter(s) Oral Mucosa every 12 hours  chlorhexidine 4% Liquid 1 Application(s) Topical <User Schedule>  doxazosin 4 milliGRAM(s) Oral at bedtime  enoxaparin Injectable 40 milliGRAM(s) SubCutaneous daily  insulin regular  human corrective regimen sliding scale   SubCutaneous every 4 hours  lacosamide IVPB 100 milliGRAM(s) IV Intermittent every 12 hours  levETIRAcetam  IVPB 1000 milliGRAM(s) IV Intermittent <User Schedule>  pantoprazole   Suspension 40 milliGRAM(s) Oral daily  senna 2 Tablet(s) Oral at bedtime  sodium chloride 0.9%. 1000 milliLiter(s) (75 mL/Hr) IV Continuous <Continuous>  sodium chloride 3%  Inhalation 3 milliLiter(s) Inhalation every 6 hours  vancomycin  IVPB      vancomycin  IVPB 1000 milliGRAM(s) IV Intermittent two times a day    MEDICATIONS  (PRN):  acetaminophen   Tablet .. 650 milliGRAM(s) Oral every 6 hours PRN Temp greater or equal to 38C (100.4F)  labetalol Injectable 10 milliGRAM(s) IV Push every 6 hours PRN Systolic blood pressure >    Imaging:  EXAM:  CT BRAIN (2021):    IMPRESSION:  In comparison with the prior CT scan of the brain dated 2021:  The current examination is limited due to streak artifact from the EEG leads.  Otherwise stable examination.  Please see the MRI of the brain report dated 2021 for further information.

## 2021-09-01 NOTE — PHYSICAL THERAPY INITIAL EVALUATION ADULT - PERTINENT HX OF CURRENT PROBLEM, REHAB EVAL
71-year-old diabetic, admitted with HHS/DKA s/p clinical tonic-clonic seizure x2 qualifying for status epilepticus on keppra 3g/d and versed gtt, course c/b respiratory failure, distributive shock, encephalopathy, metabolic derangements, extubated 8/31.

## 2021-09-01 NOTE — PHYSICAL THERAPY INITIAL EVALUATION ADULT - LEVEL OF INDEPENDENCE: CHAIR TO BED, REHAB EVAL
Spoke with patient and relayed below information. Verbalized understanding.    maximum assist (25% patients effort)

## 2021-09-01 NOTE — PHYSICAL THERAPY INITIAL EVALUATION ADULT - BED MOBILITY TRAINING, PT EVAL
Pt will participate in supine to sit and reverse using side rails with CGA by discharge to facilitate return to PLOF.

## 2021-09-01 NOTE — OCCUPATIONAL THERAPY INITIAL EVALUATION ADULT - LIVES WITH, PROFILE
pvt home, +JEISON, +stairs to bedroom, +bathtub- dtr provided insight into social history as pt is questionable historian/spouse

## 2021-09-01 NOTE — OCCUPATIONAL THERAPY INITIAL EVALUATION ADULT - GENERAL OBSERVATIONS, REHAB EVAL
Pt seen bedside for bue and ble splints
pt received seated in b/s chair b/l wrist restraints, +lubin, +tele, +BP cuff, +pulse oxi, +NG tube on hold during session, left seated in b/s chair in NAD, all andrae intact, SLP present

## 2021-09-01 NOTE — PROGRESS NOTE ADULT - ATTENDING COMMENTS
History, events, data and scans reviewed, patient examined at bedside. Assessment & plan of care as outlined above was formulated/implemented with my supervision and approval.

## 2021-09-01 NOTE — CHART NOTE - NSCHARTNOTEFT_GEN_A_CORE
T(F): 99 (21 @ 08:00), Max: 99.7 (21 @ 00:00)  HR: 108 (21 @ 09:00) (106 - 120)  BP: 172/65 (21 @ 17:00) (131/53 - 179/86)  RR: 23 (21 @ 09:00) (17 - 29)  SpO2: 100% (21 @ 10:13) (100% - 100%)    I&O's Detail    31 Aug 2021 07:01  -  01 Sep 2021 07:00  --------------------------------------------------------  IN:    Enteral Tube Flush: 150 mL    Glucerna: 150 mL    Glucerna: 240 mL    IV PiggyBack: 900 mL    Oral Fluid: 120 mL    sodium chloride 0.9%: 1175 mL    sodium chloride 0.9%: 150 mL    Sodium Chloride 0.9% Bolus: 500 mL  Total IN: 3385 mL    OUT:    Dexmedetomidine: 0 mL    Indwelling Catheter - Urethral (mL): 1365 mL    Intermittent Catheterization - Urethral (mL): 1050 mL    Norepinephrine: 0 mL  Total OUT: 2415 mL    Total NET: 970 mL    01 Sep 2021 07:01  -  01 Sep 2021 12:45  --------------------------------------------------------  IN:    Glucerna: 50 mL    sodium chloride 0.9%: 100 mL  Total IN: 150 mL    OUT:    Indwelling Catheter - Urethral (mL): 170 mL  Total OUT: 170 mL    Total NET: -20 mL        140  |  104  |  26<H>  ----------------------------<  219<H>  4.3   |  27  |  0.8    Ca    8.5      01 Sep 2021 03:07  Phos  3.5       Mg     2.2         TPro  5.1<L>  /  Alb  2.9<L>  /  TBili  0.4  /  DBili  x   /  AST  31  /  ALT  66<H>  /  AlkPhos  169<H>                         9.5    13.34 )-----------( 229      ( 01 Sep 2021 03:07 )             30.2     CAPILLARY BLOOD GLUCOSE  POCT Blood Glucose.: 239 mg/dL (01 Sep 2021 09:58)  POCT Blood Glucose.: 247 mg/dL (01 Sep 2021 06:26)  POCT Blood Glucose.: 205 mg/dL (01 Sep 2021 03:07)  POCT Blood Glucose.: 140 mg/dL (31 Aug 2021 22:41)  POCT Blood Glucose.: 135 mg/dL (31 Aug 2021 18:01)  POCT Blood Glucose.: 169 mg/dL (31 Aug 2021 13:41)    Daily Weight in k.3 (01 Sep 2021 09:00)    Diet, NPO with Tube Feed:   Tube Feeding Modality: Nasogastric  Glucerna 1.2 Moreno  Total Volume for 24 Hours (mL): 1200  Continuous  Until Goal Tube Feed Rate (mL per Hour): 50  Tube Feed Duration (in Hours): 24  Tube Feed Start Time: 18:00   Total Volume per Flush (mL): 100   Frequency: Every 4 Hours  No Carb Prosource TF     Qty per Day:  3 (21 @ 19:31)    IMP:  - status epilepticus  - intubated for airway protection  - DKA     est REE ~ 2140 kcal & protein needs per CCM ~ 135 gm/d    PLAN:  - cont Glucerna 1.2 to 70 ml/h with 3 packets Prosource TF per day --> 132 gm pro & 2115 k/d Extubated yesterday  OOB to chair, swallow eval in progress  Tm 99.7   NPO with NGT in place, tolerating feeds but infusing continuously (off for swallow eval)    T(F): 99 (21 @ 08:00), Max: 99.7 (21 @ 00:00)  HR: 108 (21 @ 09:00) (106 - 120)  BP: 172/65 (21 @ 17:00) (131/53 - 179/86)  RR: 23 (21 @ 09:00) (17 - 29)  SpO2: 100% (21 @ 10:13) (100% - 100%)    I&O's Detail    31 Aug 2021 07:01  -  01 Sep 2021 07:00  --------------------------------------------------------  IN:    Enteral Tube Flush: 150 mL    Glucerna: 150 mL    Glucerna: 240 mL    IV PiggyBack: 900 mL    Oral Fluid: 120 mL    sodium chloride 0.9%: 1175 mL    sodium chloride 0.9%: 150 mL    Sodium Chloride 0.9% Bolus: 500 mL  Total IN: 3385 mL    OUT:    Dexmedetomidine: 0 mL    Indwelling Catheter - Urethral (mL): 1365 mL    Intermittent Catheterization - Urethral (mL): 1050 mL    Norepinephrine: 0 mL  Total OUT: 2415 mL    Total NET: 970 mL    01 Sep 2021 07:01  -  01 Sep 2021 12:45  --------------------------------------------------------  IN:    Glucerna: 50 mL    sodium chloride 0.9%: 100 mL  Total IN: 150 mL    OUT:    Indwelling Catheter - Urethral (mL): 170 mL  Total OUT: 170 mL    Total NET: -20 mL        140  |  104  |  26<H>  ----------------------------<  219<H>  4.3   |  27  |  0.8    Ca    8.5      01 Sep 2021 03:07  Phos  3.5       Mg     2.2         TPro  5.1<L>  /  Alb  2.9<L>  /  TBili  0.4  /  DBili  x   /  AST  31  /  ALT  66<H>  /  AlkPhos  169<H>                         9.5    13.34 )-----------( 229      ( 01 Sep 2021 03:07 )             30.2     CAPILLARY BLOOD GLUCOSE  POCT Blood Glucose.: 239 mg/dL (01 Sep 2021 09:58)  POCT Blood Glucose.: 247 mg/dL (01 Sep 2021 06:26)  POCT Blood Glucose.: 205 mg/dL (01 Sep 2021 03:07)  POCT Blood Glucose.: 140 mg/dL (31 Aug 2021 22:41)  POCT Blood Glucose.: 135 mg/dL (31 Aug 2021 18:01)  POCT Blood Glucose.: 169 mg/dL (31 Aug 2021 13:41)    Daily Weight in k.3 (01 Sep 2021 09:00)    Diet, NPO with Tube Feed:   Tube Feeding Modality: Nasogastric  Glucerna 1.2 Moreno  Total Volume for 24 Hours (mL): 1200  Continuous  Until Goal Tube Feed Rate (mL per Hour): 50  Tube Feed Duration (in Hours): 24  Tube Feed Start Time: 18:00   Total Volume per Flush (mL): 100   Frequency: Every 4 Hours  No Carb Prosource TF     Qty per Day:  3 (21 @ 19:31)    IMP:  - status epilepticus  - intubated for airway protection  - DKA     est REE ~ 2140 kcal & protein needs per CCM ~ 135 gm/d    PLAN:  - change Glucerna 1.2 to intermittent feeding schedule of 240ml X 4 feeds/day given at meal times not q6hrs. After tolerating X 2 feeds increase to 360ml X   4 feeds/day  - if started on PO change NG feeds to pc and hs schedule until PO intake adequate  - glycemic control Extubated yesterday, off pressor today  pt awake, responds to commands, confused  OOB to chair, swallow eval in progress  Tm 99.7   NPO with NGT in place, tolerating feeds but infusing continuously (off for swallow eval) - but rate again decreased to below his goal    T(F): 99 (21 @ 08:00), Max: 99.7 (21 @ 00:00)  HR: 108 (21 @ 09:00) (106 - 120)  BP: 172/65 (21 @ 17:00) (131/53 - 179/86)  RR: 23 (21 @ 09:00) (17 - 29)  SpO2: 100% (21 @ 10:13) (100% - 100%)    I&O's Detail    31 Aug 2021 07:  -  01 Sep 2021 07:00  --------------------------------------------------------  IN:    Enteral Tube Flush: 150 mL    Glucerna: 150 mL    Glucerna: 240 mL    IV PiggyBack: 900 mL    Oral Fluid: 120 mL    sodium chloride 0.9%: 1175 mL    sodium chloride 0.9%: 150 mL    Sodium Chloride 0.9% Bolus: 500 mL  Total IN: 3385 mL    OUT:    Dexmedetomidine: 0 mL    Indwelling Catheter - Urethral (mL): 1365 mL    Intermittent Catheterization - Urethral (mL): 1050 mL    Norepinephrine: 0 mL  Total OUT: 2415 mL    Total NET: 970 mL    01 Sep 2021 07:  -  01 Sep 2021 12:45  --------------------------------------------------------  IN:    Glucerna: 50 mL    sodium chloride 0.9%: 100 mL  Total IN: 150 mL    OUT:    Indwelling Catheter - Urethral (mL): 170 mL  Total OUT: 170 mL    Total NET: -20 mL        140  |  104  |  26<H>  ----------------------------<  219<H>  4.3   |  27  |  0.8    Ca    8.5      01 Sep 2021 03:07  Phos  3.5       Mg     2.2         TPro  5.1<L>  /  Alb  2.9<L>  /  TBili  0.4  /  DBili  x   /  AST  31  /  ALT  66<H>  /  AlkPhos  169<H>                         9.5    13.34 )-----------( 229      ( 01 Sep 2021 03:07 )             30.2     CAPILLARY BLOOD GLUCOSE  POCT Blood Glucose.: 239 mg/dL (01 Sep 2021 09:58)  POCT Blood Glucose.: 247 mg/dL (01 Sep 2021 06:26)  POCT Blood Glucose.: 205 mg/dL (01 Sep 2021 03:07)  POCT Blood Glucose.: 140 mg/dL (31 Aug 2021 22:41)  POCT Blood Glucose.: 135 mg/dL (31 Aug 2021 18:01)  POCT Blood Glucose.: 169 mg/dL (31 Aug 2021 13:41)    Daily Weight in k.3 (01 Sep 2021 09:00)    Diet, NPO with Tube Feed:   Tube Feeding Modality: Nasogastric  Glucerna 1.2 Moreno  Total Volume for 24 Hours (mL): 1200  Continuous  Until Goal Tube Feed Rate (mL per Hour): 50  Tube Feed Duration (in Hours): 24  Tube Feed Start Time: 18:00   Total Volume per Flush (mL): 100   Frequency: Every 4 Hours  No Carb Prosource TF     Qty per Day:  3 (21 @ 19:31)    IMP:  - status epilepticus  - intubated for airway protection  - DKA     est REE ~ 2140 kcal & protein needs per CCM ~ 135 gm/d    PLAN:  - d/w neuro crit team   - change Glucerna 1.2 to intermittent feeding schedule of 240ml X 4 feeds/day given at meal times not q6hrs. After tolerating X 2 feeds increase to 360ml X   4 feeds/day  - if started on PO change NG feeds to pc and hs schedule until PO intake adequate  - glycemic control - once feeds are q6h then poc glucose and insulin treatments should also be pre-feed q6h

## 2021-09-01 NOTE — PROGRESS NOTE ADULT - ASSESSMENT
71-year-old diabetic, admitted with HHS/DKA s/p clinical tonic-clonic seizure x2 qualifying for status epilepticus on keppra 3g/d and versed gtt, course c/b respiratory failure, distributive shock, encephalopathy, metabolic derangements, extubated 8/31.     - Status epilepticus  - HHS/DKA  - Acute encephalopathy  - Acute respiratory failure  - Transaminitis  - Leukocytosis    Plan:  Neurological:  - Neuro Checks Q2H  - Video EEG D/C, 8/31  - Continue Keppra 1gram q8hrs  - Continue Vimpat 100mg q12hrs  - Seizure precautions   - Continue Thiamine 500 IV-->completed  - Daily Statin  - Continue ASA 81 mg daily  - PT/OT, splints on all four  - MR with small acute L occipital infarct, prior vessel imaging stable    Cardiovascular:  - Titrate pressors to MAP >65  - TTE, 8/24, with normal BiV function  - ASA, statin  - Labetolol IVP prn SBP>160  - Home dose of ACE not restarted due to mild hyperkalemia     Pulmonary:  - Extubated after successful PS trial   - HOB 45  - Chest pt  - Incentive spirometer   - Duoneb and hypertonic inhalation q 4  - CXR, new LL opacity     GI/:  - NPO except meds, failed formal speech and swallow   - Restart enteral TF Glucerna 1.2 to goal 50cc/hr  - Bowel Regimen: Senna  - Strict Is/Os  - Fail TOV, lubin catheter re-inserted, 9/1  - Cardura increased started for urinary retention  - PPI daily  - Last BM: 8/29  - Transaminitis, uptrending, Trend liver enzymes  - RUQ US= normal   - Intra-renal TYLER uptrending (FeNa 2.4%) , trend BMP    Electrolytes:  - Replete as needed  - Keep normoglycemia  - hyperK s/p lokelma, resolved     Endo:  - a1c >15.5  - NPH 18:00   - sliding scale q 4 hours  - FS q4hrs   - lantus 50 units HS   - glucose goal at 140-180     ID:  - Trend Fever curve and WBC- Borderline febrile this AM  - New Lactate (2.1)  - Sputum and Blood cx this AM  - Completed course of Rocephin  - Procalcitonin 8/30: 0.13  - U/A  - Vanco and cefepime   - f/u vanco trough tomorrow pm, 1 hour before 4th dose     Hematology:  - SCDs and SQ Lovenox    Code: Full  Dispo: ICU      Please call with any patient concerns or questions.    Yolanda Hernández, [facility]  #[id number]     71-year-old diabetic, admitted with HHS/DKA s/p clinical tonic-clonic seizure x2 qualifying for status epilepticus on keppra 3g/d and versed gtt, course c/b respiratory failure, distributive shock, encephalopathy, metabolic derangements, extubated 8/31.     - Status epilepticus  - HHS/DKA  - Acute encephalopathy  - Acute respiratory failure  - Transaminitis  - Leukocytosis    Plan:  Neurological:  - Neuro Checks Q4H  - Video EEG D/C, 8/31  - Continue Keppra 1gram q8hrs  - Continue Vimpat 100mg q12hrs  - Seizure precautions   - Continue Thiamine 500 IV-->completed  - Daily Statin  - Continue ASA 81 mg daily  - PT/OT  - MR with small acute L occipital infarct, prior vessel imaging stable    Cardiovascular:  - Titrate pressors to MAP >65  - TTE, 8/24, with normal BiV function  - ASA, statin  - Labetolol IVP prn SBP>160  - Lisinopril 10 mg Daily   - DCD a-line     Pulmonary:  - Extubated 8/31 after successful PS trial   - HOB 45  - Chest pt  - Incentive spirometer   - Duoneb and hypertonic inhalation q 4  - CXR, new LL opacity     GI/:  - NPO except meds, failed formal speech and swallow. speech and swallow will f/u today   - TF Glucerna 1.2 via ngt   - Bowel Regimen: Senna  - Strict Is/Os  - Fail TOV, lubin catheter re-inserted, 9/1  - Cardura increased started for urinary retention  - PPI daily  - Last BM: 8/29  - Transaminitis, downtrending   - RUQ US= normal, amylase and lipase WNL   - Intra-renal TYLER uptrending (FeNa 2.4%) , trend BMP    Electrolytes:  - Replete as needed  - Keep normoglycemia  - hyperK s/p lokelma, resolved     Endo:  - a1c >15.5  - NPH 30 units at noon 9/1  - sliding scale q 4 hours  - FS q4hrs   - lantus tonight HS approx 40 units, to be determined based on FS   - glucose goal at 140-180     ID:  - Trend Fever curve and WBC- Borderline febrile this AM  - Last Lactate (2.1)  - Sputum 8/31: gram moanlisa rods   - F/u Blood cx from 8/31  - UA /31 neg  - Completed course of Rocephin  - Procalcitonin 8/31: 0.2  - Vancomycin 1000 mg BID   - Changed Cefepime to Aztreonam 1000 mg every 8 hours   - F/u vanco trough tonight pm, 1 hour before 4th dose     Hematology:  - SCDs and SQ Lovenox    MSK: OOB, PT f/u     Code: Full  Dispo: ICU      Please call with any patient concerns or questions.    Jovita Jim NP   #[id number]

## 2021-09-01 NOTE — SWALLOW BEDSIDE ASSESSMENT ADULT - SLP GENERAL OBSERVATIONS
pt received OOB to chair awake alert +confused w/o c/o pain. +room air; vocal quality improved since yesterdays evaluation. +daughter at bedside

## 2021-09-02 LAB
ALBUMIN SERPL ELPH-MCNC: 2.6 G/DL — LOW (ref 3.5–5.2)
ALP SERPL-CCNC: 123 U/L — HIGH (ref 30–115)
ALT FLD-CCNC: 41 U/L — SIGNIFICANT CHANGE UP (ref 0–41)
ANION GAP SERPL CALC-SCNC: 10 MMOL/L — SIGNIFICANT CHANGE UP (ref 7–14)
APTT BLD: 18.1 SEC — CRITICAL LOW (ref 27–39.2)
AST SERPL-CCNC: 33 U/L — SIGNIFICANT CHANGE UP (ref 0–41)
BILIRUB SERPL-MCNC: 0.4 MG/DL — SIGNIFICANT CHANGE UP (ref 0.2–1.2)
BUN SERPL-MCNC: 19 MG/DL — SIGNIFICANT CHANGE UP (ref 10–20)
CALCIUM SERPL-MCNC: 8.1 MG/DL — LOW (ref 8.5–10.1)
CHLORIDE SERPL-SCNC: 106 MMOL/L — SIGNIFICANT CHANGE UP (ref 98–110)
CO2 SERPL-SCNC: 27 MMOL/L — SIGNIFICANT CHANGE UP (ref 17–32)
CREAT SERPL-MCNC: 1 MG/DL — SIGNIFICANT CHANGE UP (ref 0.7–1.5)
CULTURE RESULTS: SIGNIFICANT CHANGE UP
DRUG SCREEN, SERUM: SIGNIFICANT CHANGE UP
GLUCOSE BLDC GLUCOMTR-MCNC: 119 MG/DL — HIGH (ref 70–99)
GLUCOSE BLDC GLUCOMTR-MCNC: 137 MG/DL — HIGH (ref 70–99)
GLUCOSE BLDC GLUCOMTR-MCNC: 153 MG/DL — HIGH (ref 70–99)
GLUCOSE BLDC GLUCOMTR-MCNC: 168 MG/DL — HIGH (ref 70–99)
GLUCOSE BLDC GLUCOMTR-MCNC: 183 MG/DL — HIGH (ref 70–99)
GLUCOSE BLDC GLUCOMTR-MCNC: 201 MG/DL — HIGH (ref 70–99)
GLUCOSE SERPL-MCNC: 167 MG/DL — HIGH (ref 70–99)
HCT VFR BLD CALC: 28.4 % — LOW (ref 42–52)
HGB BLD-MCNC: 8.9 G/DL — LOW (ref 14–18)
INR BLD: 1.17 RATIO — SIGNIFICANT CHANGE UP (ref 0.65–1.3)
MCHC RBC-ENTMCNC: 28.9 PG — SIGNIFICANT CHANGE UP (ref 27–31)
MCHC RBC-ENTMCNC: 31.3 G/DL — LOW (ref 32–37)
MCV RBC AUTO: 92.2 FL — SIGNIFICANT CHANGE UP (ref 80–94)
NRBC # BLD: 0 /100 WBCS — SIGNIFICANT CHANGE UP (ref 0–0)
PLATELET # BLD AUTO: 207 K/UL — SIGNIFICANT CHANGE UP (ref 130–400)
POTASSIUM SERPL-MCNC: 4.1 MMOL/L — SIGNIFICANT CHANGE UP (ref 3.5–5)
POTASSIUM SERPL-SCNC: 4.1 MMOL/L — SIGNIFICANT CHANGE UP (ref 3.5–5)
PROT SERPL-MCNC: 4.8 G/DL — LOW (ref 6–8)
PROTHROM AB SERPL-ACNC: 13.4 SEC — HIGH (ref 9.95–12.87)
RBC # BLD: 3.08 M/UL — LOW (ref 4.7–6.1)
RBC # FLD: 13.6 % — SIGNIFICANT CHANGE UP (ref 11.5–14.5)
SARS-COV-2 RNA SPEC QL NAA+PROBE: SIGNIFICANT CHANGE UP
SODIUM SERPL-SCNC: 143 MMOL/L — SIGNIFICANT CHANGE UP (ref 135–146)
SPECIMEN SOURCE: SIGNIFICANT CHANGE UP
WBC # BLD: 11.08 K/UL — HIGH (ref 4.8–10.8)
WBC # FLD AUTO: 11.08 K/UL — HIGH (ref 4.8–10.8)

## 2021-09-02 PROCEDURE — 71045 X-RAY EXAM CHEST 1 VIEW: CPT | Mod: 26

## 2021-09-02 RX ORDER — LACOSAMIDE 50 MG/1
100 TABLET ORAL EVERY 12 HOURS
Refills: 0 | Status: DISCONTINUED | OUTPATIENT
Start: 2021-09-02 | End: 2021-09-02

## 2021-09-02 RX ORDER — LEVETIRACETAM 250 MG/1
1000 TABLET, FILM COATED ORAL
Refills: 0 | Status: DISCONTINUED | OUTPATIENT
Start: 2021-09-02 | End: 2021-09-03

## 2021-09-02 RX ORDER — ACETAMINOPHEN 500 MG
650 TABLET ORAL EVERY 6 HOURS
Refills: 0 | Status: DISCONTINUED | OUTPATIENT
Start: 2021-09-02 | End: 2021-09-09

## 2021-09-02 RX ORDER — LACOSAMIDE 50 MG/1
100 TABLET ORAL
Refills: 0 | Status: DISCONTINUED | OUTPATIENT
Start: 2021-09-02 | End: 2021-09-03

## 2021-09-02 RX ORDER — OLANZAPINE 15 MG/1
5 TABLET, FILM COATED ORAL AT BEDTIME
Refills: 0 | Status: DISCONTINUED | OUTPATIENT
Start: 2021-09-02 | End: 2021-09-09

## 2021-09-02 RX ORDER — INSULIN LISPRO 100/ML
VIAL (ML) SUBCUTANEOUS
Refills: 0 | Status: DISCONTINUED | OUTPATIENT
Start: 2021-09-02 | End: 2021-09-09

## 2021-09-02 RX ORDER — INSULIN LISPRO 100/ML
VIAL (ML) SUBCUTANEOUS EVERY 6 HOURS
Refills: 0 | Status: DISCONTINUED | OUTPATIENT
Start: 2021-09-02 | End: 2021-09-02

## 2021-09-02 RX ORDER — INSULIN GLARGINE 100 [IU]/ML
20 INJECTION, SOLUTION SUBCUTANEOUS AT BEDTIME
Refills: 0 | Status: DISCONTINUED | OUTPATIENT
Start: 2021-09-02 | End: 2021-09-09

## 2021-09-02 RX ADMIN — Medication 4 MILLIGRAM(S): at 21:14

## 2021-09-02 RX ADMIN — CHLORHEXIDINE GLUCONATE 1 APPLICATION(S): 213 SOLUTION TOPICAL at 05:31

## 2021-09-02 RX ADMIN — Medication 50 MILLIGRAM(S): at 05:29

## 2021-09-02 RX ADMIN — SENNA PLUS 2 TABLET(S): 8.6 TABLET ORAL at 21:14

## 2021-09-02 RX ADMIN — Medication 50 MILLIGRAM(S): at 21:28

## 2021-09-02 RX ADMIN — OLANZAPINE 5 MILLIGRAM(S): 15 TABLET, FILM COATED ORAL at 22:29

## 2021-09-02 RX ADMIN — ATORVASTATIN CALCIUM 20 MILLIGRAM(S): 80 TABLET, FILM COATED ORAL at 21:14

## 2021-09-02 RX ADMIN — LACOSAMIDE 100 MILLIGRAM(S): 50 TABLET ORAL at 17:13

## 2021-09-02 RX ADMIN — Medication 81 MILLIGRAM(S): at 12:20

## 2021-09-02 RX ADMIN — Medication 3 MILLILITER(S): at 15:25

## 2021-09-02 RX ADMIN — LEVETIRACETAM 1000 MILLIGRAM(S): 250 TABLET, FILM COATED ORAL at 21:14

## 2021-09-02 RX ADMIN — Medication 3 MILLILITER(S): at 03:50

## 2021-09-02 RX ADMIN — ENOXAPARIN SODIUM 40 MILLIGRAM(S): 100 INJECTION SUBCUTANEOUS at 12:21

## 2021-09-02 RX ADMIN — INSULIN GLARGINE 20 UNIT(S): 100 INJECTION, SOLUTION SUBCUTANEOUS at 01:57

## 2021-09-02 RX ADMIN — Medication 250 MILLIGRAM(S): at 08:35

## 2021-09-02 RX ADMIN — LEVETIRACETAM 400 MILLIGRAM(S): 250 TABLET, FILM COATED ORAL at 05:29

## 2021-09-02 RX ADMIN — SODIUM CHLORIDE 3 MILLILITER(S): 9 INJECTION INTRAMUSCULAR; INTRAVENOUS; SUBCUTANEOUS at 03:51

## 2021-09-02 RX ADMIN — LACOSAMIDE 120 MILLIGRAM(S): 50 TABLET ORAL at 05:29

## 2021-09-02 RX ADMIN — Medication 3 MILLILITER(S): at 20:25

## 2021-09-02 RX ADMIN — LEVETIRACETAM 1000 MILLIGRAM(S): 250 TABLET, FILM COATED ORAL at 15:31

## 2021-09-02 RX ADMIN — Medication 4: at 21:27

## 2021-09-02 RX ADMIN — PANTOPRAZOLE SODIUM 40 MILLIGRAM(S): 20 TABLET, DELAYED RELEASE ORAL at 15:32

## 2021-09-02 RX ADMIN — Medication 50 MILLIGRAM(S): at 15:30

## 2021-09-02 RX ADMIN — Medication 3 MILLILITER(S): at 08:37

## 2021-09-02 RX ADMIN — INSULIN GLARGINE 20 UNIT(S): 100 INJECTION, SOLUTION SUBCUTANEOUS at 22:29

## 2021-09-02 RX ADMIN — Medication 250 MILLIGRAM(S): at 21:13

## 2021-09-02 RX ADMIN — Medication 2: at 12:22

## 2021-09-02 RX ADMIN — Medication 2: at 16:56

## 2021-09-02 RX ADMIN — LISINOPRIL 10 MILLIGRAM(S): 2.5 TABLET ORAL at 05:29

## 2021-09-02 RX ADMIN — Medication 10 MILLIGRAM(S): at 04:15

## 2021-09-02 RX ADMIN — SODIUM CHLORIDE 3 MILLILITER(S): 9 INJECTION INTRAMUSCULAR; INTRAVENOUS; SUBCUTANEOUS at 08:37

## 2021-09-02 NOTE — PROGRESS NOTE ADULT - SUBJECTIVE AND OBJECTIVE BOX
Neurology Critical Care Note     HPI:  70 y/o M with pmh of dm, osteomyelitis, gerd, depression, hld, htn presenting to the ED for above cc.   the patient was doing last night until he was unable to talk and walk independently. As per his ex wife the right sided then started shaking. She called 911.   On presentation the patient had status epilepticus and he received 4 mg of lorazepam.   CT brain done and an acute stroke could not be excluded because of technical difficulties.  His blood glucose was found to be more than 600.   he is admitted for [facility]    the ex wife said that he has bipolar and he was on a medication, although this is not found in the chart. She also does not recall his meds  sp intubation for airway protection, on propofol, INSULI, 1/2 ns 150 cc/h (24 Aug 2021 04:34)    Overnight events: htn overnight labetolol x 1 ivp given, NAD     PAST MEDICAL & SURGICAL HISTORY:    DM (diabetes mellitus)  18 hgb aic  11.2    HTN (hypertension)    Dyslipidemia    Diabetic foot ulcer    Depression    GERD (gastroesophageal reflux disease)    Diabetes    Toe amputation status, right  ()    History of amputation of toe  LT -partial 1st toe        Home Medications:  amoxicillin-clavulanate 875 mg-125 mg oral tablet: 1 tab(s) orally 2 times a day (12 Mar 2021 10:42)  benazepril 40 mg oral tablet: 1 tab(s) orally once a day (08 Mar 2021 20:15)  Crestor 10 mg oral tablet: 1 tab(s) orally once a day (at bedtime) (08 Mar 2021 20:15)  Janumet 50 mg-1000 mg oral tablet: 1 tab(s) orally 2 times a day (08 Mar 2021 20:15)  Lantus 100 units/mL subcutaneous solution: 30 unit(s) subcutaneous once a day in the morning (08 Mar 2021 20:15)  omeprazole 20 mg oral delayed release capsule: 1 cap(s) orally once a day (08 Mar 2021 20:15)  Trulicity Pen 0.75 mg/0.5 mL subcutaneous solution:  (08 Mar 2021 20:15)    Current Medications   albuterol/ipratropium for Nebulization 3 milliLiter(s) Nebulizer every 6 hours  aspirin  chewable 81 milliGRAM(s) Oral daily  atorvastatin 20 milliGRAM(s) Oral at bedtime  aztreonam  IVPB 1000 milliGRAM(s) IV Intermittent every 8 hours  chlorhexidine 4% Liquid 1 Application(s) Topical <User Schedule>  doxazosin 4 milliGRAM(s) Oral at bedtime  enoxaparin Injectable 40 milliGRAM(s) SubCutaneous daily  insulin glargine Injectable (LANTUS) 20 Unit(s) SubCutaneous at bedtime  insulin lispro (ADMELOG) corrective regimen sliding scale   SubCutaneous every 6 hours  lacosamide 100 milliGRAM(s) Oral two times a day  levETIRAcetam 1000 milliGRAM(s) Oral <User Schedule>  lisinopril 10 milliGRAM(s) Oral daily  OLANZapine 5 milliGRAM(s) Oral at bedtime  pantoprazole   Suspension 40 milliGRAM(s) Oral daily  senna 2 Tablet(s) Oral at bedtime  vancomycin  IVPB      vancomycin  IVPB 1000 milliGRAM(s) IV Intermittent two times a day      T(F): 98.1 (21 @ 12:00), Max: 100.1 (21 @ 00:00)  HR: 98 (21 @ 12:00) (92 - 116)  BP: 138/75 (21 @ 12:00) (132/54 - 171/71)  RR: 24 (21 @ 12:00) (20 - 41)  SpO2: 96% (21 @ 12:00) (76% - 100%)                        9.5    13.34 )-----------( 229      ( 01 Sep 2021 03:07 )             30.2         140  |  104  |  26<H>  ----------------------------<  219<H>  4.3   |  27  |  0.8    Ca    8.5      01 Sep 2021 03:07  Phos  3.5       Mg     2.2         TPro  5.1<L>  /  Alb  2.9<L>  /  TBili  0.4  /  DBili  x   /  AST  31  /  ALT  66<H>  /  AlkPhos  169<H>      PT/INR - ( 01 Sep 2021 03:07 )   PT: 17.20 sec;   INR: 1.50 ratio         PTT - ( 01 Sep 2021 03:07 )  PTT:31.8 sec  Urinalysis Basic - ( 31 Aug 2021 16:16 )    Color: Light Yellow / Appearance: Clear / S.020 / pH: x  Gluc: x / Ketone: Negative  / Bili: Negative / Urobili: <2 mg/dL   Blood: x / Protein: Trace / Nitrite: Negative   Leuk Esterase: Negative / RBC: x / WBC x   Sq Epi: x / Non Sq Epi: x / Bacteria: x      LIVER FUNCTIONS - ( 01 Sep 2021 03:07 )  Alb: 2.9 g/dL / Pro: 5.1 g/dL / ALK PHOS: 169 U/L / ALT: 66 U/L / AST: 31 U/L / GGT: x                     I&O's Detail    01 Sep 2021 07:01  -  02 Sep 2021 07:00  --------------------------------------------------------  IN:    Enteral Tube Flush: 50 mL    Glucerna: 470 mL    IV PiggyBack: 350 mL    Oral Fluid: 480 mL    sodium chloride 0.9%: 1100 mL  Total IN: 2450 mL    OUT:    Indwelling Catheter - Urethral (mL): 2345 mL  Total OUT: 2345 mL    Total NET: 105 mL      02 Sep 2021 07:01  -  02 Sep 2021 12:19  --------------------------------------------------------  IN:    IV PiggyBack: 250 mL    Oral Fluid: 100 mL    sodium chloride 0.9%: 200 mL  Total IN: 550 mL    OUT:    Indwelling Catheter - Urethral (mL): 345 mL  Total OUT: 345 mL    Total NET: 205 mL        Neurologic:  Mental status: Awake, alert, oriented to person, however disoriented to place and time. Able to name objects, follows commands. Attention/concentration intact. No aphasia.  Cranial Nerves:  II: Visual fields are full to confrontation. Pupils equally round and reactive to light, 3mm sluggish bilaterally  III, IV, VI: No gaze preference. EOMI without nystagmus  V:  V1-V3 sensation intact bilaterally   VII: No facial droop, face is symmetric with normal eye closure and smile, no loss of nasolabial folds  XII: Tongue midline with no deviation   Motor: Normal bulk and tone. Able to lift B/U UE and LE antigravity, no drift noted   Sensation: Intact to light touch. No neglect.

## 2021-09-02 NOTE — PHARMACOTHERAPY INTERVENTION NOTE - COMMENTS
2 orders for lacosamide 100mg q12h po & IV, d/w neuro, will d/c IV
Zosyn 3.375g IV q8h, HAP, recommended increasing to 4.5g IV q8h
pt on Zosyn 4.5g IV q8h, new order for vancomycin 1.25g IV q12h   -d/w neuro team, Zosyn & vancomycin drug interaction, increased incidence of TYLER, recommended d/c Zosyn & utilizing cefepime 2g IV q8h
plan-continue levetiracetam, pt received load, not currently on maintenance, d/w team  -start levetiracetam 750mg IV q12h
d/w team, recommended changing pantoprazole to suspension
-vancomycin 1.25g IV q12h, SCr 1.1, recommended adjusting to 1g IV q12h

## 2021-09-02 NOTE — CONSULT NOTE ADULT - ASSESSMENT
IMPRESSION: Rehab of encephalopathy / DKA / seizure    PRECAUTIONS: [   ] Cardiac  [   ] Respiratory  [   ] Seizures [ x  ] Contact Isolation  [   ] Droplet Isolation  [   ] Other    Weight Bearing Status:     RECOMMENDATION:    Out of Bed to Chair     DVT/Decubiti Prophylaxis    REHAB PLAN:     [  x  ] Bedside P/T 3-5 times a week   [ x   ]   Bedside O/T  2-3 times a week             [    ] Speech Therapy               [    ]  No Rehab Therapy Indicated   Conditioning/ROM                                    ADL  Bed Mobility                                               Conditioning/ROM  Transfers                                                     Bed Mobility  Sitting /Standing Balance                         Transfers                                        Gait Training                                               Sitting/Standing Balance  Stair Training [   ]Applicable                    Home equipment Eval                                                                        Splinting  [   ] Only      GOALS:   ADL   [ x   ]   Independent                    Transfers  [ x   ] Independent                          Ambulation  [  x  ] Independent     [  x   ] With device                            [    ]  CG                                                         [    ]  CG                                                                  [    ] CG                            [    ] Min A                                                   [    ] Min A                                                              [    ] Min  A          DISCHARGE PLAN:   [    ]  Good candidate for Intensive Rehabilitation/Hospital based                                             Will tolerate 3hrs Intensive Rehab Daily                                       [     ]  Short Term Rehab in Skilled Nursing Facility                                       [     ]  Home with Outpatient or VN services                                         [ x    ]  Possible Candidate for Intensive Hospital based Rehab

## 2021-09-02 NOTE — PROGRESS NOTE ADULT - ASSESSMENT
71-year-old diabetic, admitted with HHS/DKA s/p clinical tonic-clonic seizure x2 qualifying for status epilepticus on keppra 3g/d and versed gtt, course c/b respiratory failure, distributive shock, encephalopathy, metabolic derangements, extubated 8/31.     - Status epilepticus  - HHS/DKA  - Acute encephalopathy  - Acute respiratory failure  - Transaminitis  - Leukocytosis    Plan:  Neurological:  - Neuro Checks Q8H  - Video EEG D/C, 8/31  - Continue Keppra 1gram q8hrs PO   - Continue Vimpat 100mg q12hrs PO   - Seizure precautions   - Continue Thiamine 500 IV-->completed  - Daily Statin  - Continue ASA 81 mg daily  - PT/OT  - MR with small acute L occipital infarct, prior vessel imaging stable    Cardiovascular:  - TTE, 8/24, with normal BiV function  - ASA, statin  - Labetolol IVP prn SBP>160  - Lisinopril 10 mg Daily     Pulmonary:  - Extubated 8/31 after successful PS trial   - HOB 45  - Chest pt  - Incentive spirometer   - Duoneb q 4  - CXR, new LL opacity     GI/:  - TF Glucerna 1.2 via ngt bolus in addition to PO intake until adequate calories   - Calorie count   - Bowel Regimen: Senna  - Strict Is/Os  - Fail TOV, lubin catheter re-inserted, 9/1  - Cardura increased started for urinary retention  - PPI daily  - Last BM: 8/29  - Transaminitis, downtrending   - RUQ US= normal, amylase and lipase WNL   - Intra-renal TYLER uptrending (FeNa 2.4%) , trend BMP    Electrolytes:  - Replete as needed  - Keep normoglycemia    Endo:  - a1c >15.5  - sliding scale q 6 hours  - FS q6hrs   - lantus 20 units HS  - glucose goal at 140-180     ID:  - Trend Fever curve and WBC- Borderline febrile this AM  - Last Lactate (2.1)  - Sputum 8/31: gram monalisa rods   - F/u Blood cx from 8/31  - UA /31 neg  - Completed course of Rocephin  - Procalcitonin 8/31: 0.2  - Vancomycin 1000 mg BID   - Aztreonam 1000 mg every 8 hours   - Next vanco trough 9/3 pm    Hematology:  - SCDs and SQ Lovenox    MSK: OOB, PT f/u     Code: Full  Dispo: ICU    Please call with any patient concerns or questions.    Jovita Jim NP   #[id number]

## 2021-09-02 NOTE — CHART NOTE - NSCHARTNOTEFT_GEN_A_CORE
ICU Transfer Note    Transfer from: Neuro ICU    Transfer to: ( x ) Medicine    (  ) Telemetry    (  ) RCU                               (  ) Palliative    (  ) Stroke Unit    (  ) MICU    (  ) __________________    Accepting Physician:    Signout given to:     HPI / ICU COURSE:    71-year-old diabetic, admitted with HHS/DKA s/p clinical tonic-clonic seizure x2 qualifying for status epilepticus on 8/24/21. Hx of DM, Diabetic foot ulcers, Amputation of toes, PVD, GERD, osteoarthritis, depressions, bipolar, HLD, HTN, and substance abuse. Patient initial glucose >600 pt started on insulin drip with resolution. Pt received Keppra 3g loading dose. Pt was subsequently intubated for airway protection and started on versed gtt, course c/b respiratory failure, distributive shock, encephalopathy, metabolic derangements. Tox + for benzos. Versed continous infusion was discontinued 8/27. Pt was extubated 8/31. During Neuro ICU admission, pt was placed on Veeg. Negative for seizures but vimpat was added to AEDs regime for bifrontal/ FP and stimulus dependent runs of periodic generalized triphasic sharps. CTH/CTA unremarkable. MRI on 8/26 revealing punctate acute infarct involving the left occipital cortex.      Pt spiking fevers on 8/31. Blood cx pre rausch neg, ua neg. New LLL oppacity. Pt started on AB.   PHYSICAL EXAM:    T(F): 98.5 (09-02-21 @ 08:00), Max: 100.1 (09-02-21 @ 00:00)  HR: 102 (09-02-21 @ 11:00) (92 - 116)  BP: 158/63 (09-02-21 @ 11:00) (132/54 - 171/71)  RR: 23 (09-02-21 @ 11:00) (20 - 41)  SpO2: 97% (09-02-21 @ 11:00) (76% - 100%)  GENERAL: NAD, well-developed, confused.   CHEST/LUNG: CTAB  HEART: RRR, no murmurs  ABDOMEN: Soft, Nontender, Nondistended; Bowel sounds present  EXTREMITIES:  2+ Peripheral Pulses, No clubbing, cyanosis, or edema, toe amputations b/l   NEURO: Awake, alert, confused. Pt is not able to state current location. Able to state the current year, his name. Naming intact. EOMS intact. No face droop. Pupils equal and reactive. B/L UE 5/5, b/l LE 5/5. No focal deficits.       I&O's Summary    01 Sep 2021 07:01  -  02 Sep 2021 07:00  --------------------------------------------------------  IN: 2450 mL / OUT: 2345 mL / NET: 105 mL    02 Sep 2021 07:01  -  02 Sep 2021 11:18  --------------------------------------------------------  IN: 500 mL / OUT: 160 mL / NET: 340 mL        LABS:                               9.5    13.34 )-----------( 229      ( 01 Sep 2021 03:07 )             30.2       09-01    140  |  104  |  26<H>  ----------------------------<  219<H>  4.3   |  27  |  0.8    Ca    8.5      01 Sep 2021 03:07  Phos  3.5     09-01  Mg     2.2     09-01    TPro  5.1<L>  /  Alb  2.9<L>  /  TBili  0.4  /  DBili  x   /  AST  31  /  ALT  66<H>  /  AlkPhos  169<H>  09-01      PT/INR - ( 01 Sep 2021 03:07 )   PT: 17.20 sec;   INR: 1.50 ratio         PTT - ( 01 Sep 2021 03:07 )  PTT:31.8 sec      Imaging:    CT Head No Cont (08.28.21 @ 03:09)     IMPRESSION:    In comparison with the prior CT scan of the brain dated August 24, 2021:    The current examination is limited due to streak artifact from the EEG leads.    Otherwise stable examination.    MR Head No Cont (08.26.21 @ 16:15)     IMPRESSION:  Punctate acute infarct involving the left occipital cortex. No evidence of hemorrhage.    Mild chronic microvascular changes.    VEEG 9/25/2021    Seizures-----------  none    Impression:   abnormal due to the focal and generalized slowing and interictal as described above      ASSESSMENT & PLAN:     71-year-old diabetic, admitted with HHS/DKA s/p clinical tonic-clonic seizure x2 qualifying for status epilepticus on keppra 3g/d and versed gtt, course c/b respiratory failure, distributive shock, encephalopathy, metabolic derangements, extubated 8/31.     #Status epilepticus- resolved   - veeg dcd 8/31  - Continue Keppra 1gram q8hrs  - Continue Vimpat 100mg q12hrs    #HHS/DKA- improved    #Acute encephalopathy    - Continue neuro checks q 8 hours   #Acute respiratory failure- resolved s/p extubation     #Transaminitis- improved     #Leukocytosis, LLL opacity   -C/w Vancomycin, last trough 9/1  - C/w aztreonam   - f/u final read of blood cx 8/31, and sputum sensitivities    - sputum 8/31 + gram neg rods     #Hypertension  - Lisinopril 10 mg PO daily started 9/1   #Small acute L occipital infarct  -C/w aspirin 81 mg daily   -C/w Statin    Full Code       FOR FOLLOW UP:  [ ]   [ ]   [ ] Continue PT/OT        Pulmonary:  - Extubated 8/31 after successful PS trial   - HOB 45  - Chest pt  - Incentive spirometer   - Duoneb and hypertonic inhalation q 4  - CXR, new LLL opacity     GI/:  - NPO except meds, failed formal speech and swallow. speech and swallow will f/u today   - TF Glucerna 1.2 via ngt   - Bowel Regimen: Senna  - Strict Is/Os  - Fail TOV, lubin catheter re-inserted, 9/1  - Cardura increased started for urinary retention  - PPI daily  - Last BM: 8/29  - Transaminitis, downtrending   - RUQ US= normal, amylase and lipase WNL   - Intra-renal TYLER uptrending (FeNa 2.4%) , trend BMP    Electrolytes:  - Replete as needed  - Keep normoglycemia  - hyperK s/p lokelma, resolved     Endo:  - a1c >15.5  - NPH 30 units at noon 9/1  - sliding scale q 4 hours  - FS q4hrs   - lantus tonight HS approx 40 units, to be determined based on FS   - glucose goal at 140-180     ID:  - Trend Fever curve and WBC- Borderline febrile this AM  - Last Lactate (2.1)  - Sputum 8/31: gram monalisa rods   - F/u Blood cx from 8/31  - UA /31 neg  - Completed course of Rocephin  - Procalcitonin 8/31: 0.2  - Vancomycin 1000 mg BID   - Changed Cefepime to Aztreonam 1000 mg every 8 hours   - F/u vanco trough tonight pm, 1 hour before 4th dose     Hematology:  - SCDs and SQ Lovenox    MSK: OOB, PT f/u     Code: Full                                                        Jovita Jim NP   ex [id number] ICU Transfer Note    Transfer from: Neuro ICU    Transfer to: ( x ) Medicine    (  ) Telemetry    (  ) RCU                               (  ) Palliative    (  ) Stroke Unit    (  ) MICU    (  ) __________________    Accepting Physician:    Signout given to:     HPI / ICU COURSE:    71-year-old diabetic, admitted with HHS/DKA s/p clinical tonic-clonic seizure x2 qualifying for status epilepticus on 8/24/21. Hx of DM, Diabetic foot ulcers, Amputation of toes, PVD, GERD, osteoarthritis, depressions, bipolar, HLD, HTN, and substance abuse. Patient initial glucose >600 pt started on insulin drip with resolution. Pt received Keppra 3g loading dose. Pt was subsequently intubated for airway protection and started on versed gtt, course c/b respiratory failure, distributive shock, encephalopathy, metabolic derangements. Tox + for benzos. Versed continous infusion was discontinued 8/27. Pt was extubated 8/31. During Neuro ICU admission, pt was placed on Veeg. Negative for seizures but vimpat was added to AEDs regime for bifrontal/ FP and stimulus dependent runs of periodic generalized triphasic sharps. CTH/CTA unremarkable. MRI on 8/26 revealing punctate acute infarct involving the left occipital cortex.      Pt spiking fevers on 8/31. Blood cx pre rausch neg, ua neg. New LLL oppacity. Pt started on AB.   PHYSICAL EXAM:    T(F): 98.5 (09-02-21 @ 08:00), Max: 100.1 (09-02-21 @ 00:00)  HR: 102 (09-02-21 @ 11:00) (92 - 116)  BP: 158/63 (09-02-21 @ 11:00) (132/54 - 171/71)  RR: 23 (09-02-21 @ 11:00) (20 - 41)  SpO2: 97% (09-02-21 @ 11:00) (76% - 100%)  GENERAL: NAD, well-developed, confused.   CHEST/LUNG: CTAB  HEART: RRR, no murmurs  ABDOMEN: Soft, Nontender, Nondistended; Bowel sounds present  EXTREMITIES:  2+ Peripheral Pulses, No clubbing, cyanosis, or edema, toe amputations b/l   NEURO: Awake, alert, confused. Pt is not able to state current location. Able to state the current year, his name. Naming intact. EOMS intact. No face droop. Pupils equal and reactive. B/L UE 5/5, b/l LE 5/5. No focal deficits.       I&O's Summary    01 Sep 2021 07:01  -  02 Sep 2021 07:00  --------------------------------------------------------  IN: 2450 mL / OUT: 2345 mL / NET: 105 mL    02 Sep 2021 07:01  -  02 Sep 2021 11:18  --------------------------------------------------------  IN: 500 mL / OUT: 160 mL / NET: 340 mL        LABS:                               9.5    13.34 )-----------( 229      ( 01 Sep 2021 03:07 )             30.2       09-01    140  |  104  |  26<H>  ----------------------------<  219<H>  4.3   |  27  |  0.8    Ca    8.5      01 Sep 2021 03:07  Phos  3.5     09-01  Mg     2.2     09-01    TPro  5.1<L>  /  Alb  2.9<L>  /  TBili  0.4  /  DBili  x   /  AST  31  /  ALT  66<H>  /  AlkPhos  169<H>  09-01      PT/INR - ( 01 Sep 2021 03:07 )   PT: 17.20 sec;   INR: 1.50 ratio         PTT - ( 01 Sep 2021 03:07 )  PTT:31.8 sec      Imaging:    CT Head No Cont (08.28.21 @ 03:09)     IMPRESSION:    In comparison with the prior CT scan of the brain dated August 24, 2021:    The current examination is limited due to streak artifact from the EEG leads.    Otherwise stable examination.    MR Head No Cont (08.26.21 @ 16:15)     IMPRESSION:  Punctate acute infarct involving the left occipital cortex. No evidence of hemorrhage.    Mild chronic microvascular changes.    VEEG 9/25/2021    Seizures-----------  none    Impression:   abnormal due to the focal and generalized slowing and interictal as described above      ASSESSMENT & PLAN:     71-year-old diabetic, admitted with HHS/DKA s/p clinical tonic-clonic seizure x2 qualifying for status epilepticus on keppra 3g/d and versed gtt, course c/b respiratory failure, distributive shock, encephalopathy, metabolic derangements, extubated 8/31.     #Status epilepticus- resolved   - veeg dcd 8/31  - Continue Keppra 1gram q8hrs  - Continue Vimpat 100mg q12hrs    #HHS/DKA- improved  - Passed speech and swallow on 9/1  - Continue bolus feeds until PO intake adequate via ngt  - calorie count x 2 days   - Sliding scale q 6 hours, lantus 20 units at night     #Acute encephalopathy  - Continue neuro checks q 8 hours     #Acute respiratory failure- resolved s/p extubation  #LLL opacity / PNA   -C/w Vancomycin, last trough 9/1  - C/w aztreonam   - f/u final read of blood cx 8/31, and sputum sensitivities    - sputum 8/31 + gram neg rods   - duoneb and chest pt q 4   - repeat cxr in 2 days     #Hypertension  - Lisinopril 10 mg PO daily started 9/1     #Small acute L occipital infarct  -C/w aspirin 81 mg daily   -C/w Statin    #Urinary Retention  -Failed TOV x3  -Will transfer with lubin catheter in place  -c/w cardura 4 mg daily   -attempt TOV in 2 days     Full Code       FOR FOLLOW UP:  [ ] F/u Blood cx final result from 8/31, and sputum cx   [ ] Continue monitoring blood glucose   [ ] Continue AB started 8/31  [ ] Continue PT/OT    Jovita Jim NP   ex [id number] ICU Transfer Note    Transfer from: Neuro ICU    Transfer to: ( x ) Medicine    (  ) Telemetry    (  ) RCU                               (  ) Palliative    (  ) Stroke Unit    (  ) MICU    (  ) __________________    Accepting Physician:    Signout given to:     HPI / ICU COURSE:    71-year-old diabetic, admitted with HHS/DKA s/p clinical tonic-clonic seizure x2 qualifying for status epilepticus on 8/24/21. Hx of DM, Diabetic foot ulcers, Amputation of toes, PVD, GERD, osteoarthritis, depressions, bipolar, HLD, HTN, and substance abuse. Patient initial glucose >600 pt started on insulin drip with resolution. Pt received Keppra 3g loading dose. Pt was subsequently intubated for airway protection and started on versed gtt, course c/b respiratory failure, distributive shock, encephalopathy, metabolic derangements. Tox + for benzos. Versed continous infusion was discontinued 8/27. Pt was extubated 8/31. During Neuro ICU admission, pt was placed on Veeg. Negative for seizures but vimpat was added to AEDs regime for bifrontal/ FP and stimulus dependent runs of periodic generalized triphasic sharps. CTH/CTA unremarkable. MRI on 8/26 revealing punctate acute infarct involving the left occipital cortex.      Pt spiking fevers on 8/31. Blood cx pre rausch neg, ua neg. New LLL oppacity. Pt started on AB.   PHYSICAL EXAM:    T(F): 98.5 (09-02-21 @ 08:00), Max: 100.1 (09-02-21 @ 00:00)  HR: 102 (09-02-21 @ 11:00) (92 - 116)  BP: 158/63 (09-02-21 @ 11:00) (132/54 - 171/71)  RR: 23 (09-02-21 @ 11:00) (20 - 41)  SpO2: 97% (09-02-21 @ 11:00) (76% - 100%)  GENERAL: NAD, well-developed, confused.   CHEST/LUNG: CTAB  HEART: RRR, no murmurs  ABDOMEN: Soft, Nontender, Nondistended; Bowel sounds present  EXTREMITIES:  2+ Peripheral Pulses, No clubbing, cyanosis, or edema, toe amputations b/l   NEURO: Awake, alert, confused. Pt is not able to state current location. Able to state the current year, his name. Naming intact. EOMS intact. No face droop. Pupils equal and reactive. B/L UE 5/5, b/l LE 5/5. No focal deficits.       I&O's Summary    01 Sep 2021 07:01  -  02 Sep 2021 07:00  --------------------------------------------------------  IN: 2450 mL / OUT: 2345 mL / NET: 105 mL    02 Sep 2021 07:01  -  02 Sep 2021 11:18  --------------------------------------------------------  IN: 500 mL / OUT: 160 mL / NET: 340 mL        LABS:                               9.5    13.34 )-----------( 229      ( 01 Sep 2021 03:07 )             30.2       09-01    140  |  104  |  26<H>  ----------------------------<  219<H>  4.3   |  27  |  0.8    Ca    8.5      01 Sep 2021 03:07  Phos  3.5     09-01  Mg     2.2     09-01    TPro  5.1<L>  /  Alb  2.9<L>  /  TBili  0.4  /  DBili  x   /  AST  31  /  ALT  66<H>  /  AlkPhos  169<H>  09-01      PT/INR - ( 01 Sep 2021 03:07 )   PT: 17.20 sec;   INR: 1.50 ratio         PTT - ( 01 Sep 2021 03:07 )  PTT:31.8 sec      Imaging:    CT Head No Cont (08.28.21 @ 03:09)     IMPRESSION:    In comparison with the prior CT scan of the brain dated August 24, 2021:    The current examination is limited due to streak artifact from the EEG leads.    Otherwise stable examination.    MR Head No Cont (08.26.21 @ 16:15)     IMPRESSION:  Punctate acute infarct involving the left occipital cortex. No evidence of hemorrhage.    Mild chronic microvascular changes.    VEEG 9/25/2021    Seizures-----------  none    Impression:   abnormal due to the focal and generalized slowing and interictal as described above      ASSESSMENT & PLAN:     71-year-old diabetic, admitted with HHS/DKA s/p clinical tonic-clonic seizure x2 qualifying for status epilepticus on keppra 3g/d and versed gtt, course c/b respiratory failure, distributive shock, encephalopathy, metabolic derangements, extubated 8/31.     #Status epilepticus- resolved   - veeg dcd 8/31  - Continue Keppra 1gram q8hrs  - Continue Vimpat 100mg q12hrs    #HHS/DKA- improved  - Passed speech and swallow on 9/1  - Continue bolus feeds until PO intake adequate via ngt  - calorie count x 2 days   - Sliding scale q 6 hours, lantus 20 units at night     #Acute encephalopathy  - Continue neuro checks q 8 hours     #Acute respiratory failure- resolved s/p extubation  #LLL opacity / PNA   -C/w Vancomycin, last trough 9/1  - C/w aztreonam   - f/u final read of blood cx 8/31, and sputum sensitivities    - sputum 8/31 + gram neg rods   - duoneb and chest pt q 4   - repeat cxr in 2 days     #Hypertension  - Lisinopril 10 mg PO daily started 9/1     #Small acute L occipital infarct  -C/w aspirin 81 mg daily   -C/w Statin    #Urinary Retention  -Failed TOV x3  -Will transfer with lubin catheter in place  -c/w cardura 4 mg daily   -attempt TOV in 2 days     Full Code       FOR FOLLOW UP:  [ ] F/u Blood cx final result from 8/31, and sputum cx   [ ] Continue monitoring blood glucose   [ ] Continue AB started 8/31  [ ] Continue PT/OT  [ ] Next vanco trough 9/3 17:00     Jovita Jim NP   ex [id number] ICU Transfer Note    Transfer from: Neuro ICU    Transfer to: ( x ) Medicine    (  ) Telemetry    (  ) RCU                               (  ) Palliative    (  ) Stroke Unit    (  ) MICU    (  ) __________________    Accepting Physician:    Signout given to:     HPI / ICU COURSE:    71-year-old diabetic, admitted with HHS/DKA s/p clinical tonic-clonic seizure x2 qualifying for status epilepticus on 8/24/21. Hx of DM, Diabetic foot ulcers, Amputation of toes, PVD, GERD, osteoarthritis, depressions, bipolar, HLD, HTN, and substance abuse. Patient initial glucose >600 pt started on insulin drip with resolution. Pt received Keppra 3g loading dose. Pt was subsequently intubated for airway protection and started on versed gtt, course c/b respiratory failure, distributive shock, encephalopathy, metabolic derangements. Tox + for benzos. Versed continous infusion was discontinued 8/27. Pt was extubated 8/31. During Neuro ICU admission, pt was placed on Veeg. Negative for seizures but vimpat was added to AEDs regime for bifrontal/ FP and stimulus dependent runs of periodic generalized triphasic sharps. CTH/CTA unremarkable. MRI on 8/26 revealing punctate acute infarct involving the left occipital cortex.      Pt spiking fevers on 8/31. Blood cx pre rausch neg, ua neg. New LLL oppacity. Pt started on AB.   PHYSICAL EXAM:    T(F): 98.5 (09-02-21 @ 08:00), Max: 100.1 (09-02-21 @ 00:00)  HR: 102 (09-02-21 @ 11:00) (92 - 116)  BP: 158/63 (09-02-21 @ 11:00) (132/54 - 171/71)  RR: 23 (09-02-21 @ 11:00) (20 - 41)  SpO2: 97% (09-02-21 @ 11:00) (76% - 100%)  GENERAL: NAD, well-developed, confused.   CHEST/LUNG: CTAB  HEART: RRR, no murmurs  ABDOMEN: Soft, Nontender, Nondistended; Bowel sounds present  EXTREMITIES:  2+ Peripheral Pulses, No clubbing, cyanosis, or edema, toe amputations b/l   NEURO: Awake, alert, confused. Pt is not able to state current location. Able to state the current year, his name. Naming intact. EOMS intact. No face droop. Pupils equal and reactive. B/L UE 5/5, b/l LE 5/5. No focal deficits.       I&O's Summary    01 Sep 2021 07:01  -  02 Sep 2021 07:00  --------------------------------------------------------  IN: 2450 mL / OUT: 2345 mL / NET: 105 mL    02 Sep 2021 07:01  -  02 Sep 2021 11:18  --------------------------------------------------------  IN: 500 mL / OUT: 160 mL / NET: 340 mL        LABS:                               9.5    13.34 )-----------( 229      ( 01 Sep 2021 03:07 )             30.2       09-01    140  |  104  |  26<H>  ----------------------------<  219<H>  4.3   |  27  |  0.8    Ca    8.5      01 Sep 2021 03:07  Phos  3.5     09-01  Mg     2.2     09-01    TPro  5.1<L>  /  Alb  2.9<L>  /  TBili  0.4  /  DBili  x   /  AST  31  /  ALT  66<H>  /  AlkPhos  169<H>  09-01      PT/INR - ( 01 Sep 2021 03:07 )   PT: 17.20 sec;   INR: 1.50 ratio         PTT - ( 01 Sep 2021 03:07 )  PTT:31.8 sec      Imaging:    CT Head No Cont (08.28.21 @ 03:09)     IMPRESSION:    In comparison with the prior CT scan of the brain dated August 24, 2021:    The current examination is limited due to streak artifact from the EEG leads.    Otherwise stable examination.    MR Head No Cont (08.26.21 @ 16:15)     IMPRESSION:  Punctate acute infarct involving the left occipital cortex. No evidence of hemorrhage.    Mild chronic microvascular changes.    VEEG 9/25/2021    Seizures-----------  none    Impression:   abnormal due to the focal and generalized slowing and interictal as described above      ASSESSMENT & PLAN:     71-year-old diabetic, admitted with HHS/DKA s/p clinical tonic-clonic seizure x2 qualifying for status epilepticus on keppra 3g/d and versed gtt, course c/b respiratory failure, distributive shock, encephalopathy, metabolic derangements, extubated 8/31.     #Status epilepticus- resolved   - veeg dcd 8/31  - Continue Keppra 1gram q8hrs  - Continue Vimpat 100mg q12hrs    #HHS/DKA- improved  - Passed speech and swallow on 9/1  - calorie count x 2 days   - Sliding scale q AC and HS  - lantus 20 units at night     #Acute encephalopathy  - Continue neuro checks q 8 hours     #Acute respiratory failure- resolved s/p extubation  #LLL opacity / PNA   -C/w Vancomycin, last trough 9/1  - C/w aztreonam   - f/u final read of blood cx 8/31, and sputum sensitivities    - sputum 8/31 + gram neg rods   - duoneb and chest pt q 4   - repeat cxr in 2 days     #Hypertension  - Lisinopril 10 mg PO daily started 9/1     #Small acute L occipital infarct  -C/w aspirin 81 mg daily   -C/w Statin    #Urinary Retention  -Failed TOV x3  -Will transfer with lubin catheter in place  -c/w cardura 4 mg daily   -attempt TOV in 2 days     Full Code       FOR FOLLOW UP:  [ ] F/u Blood cx final result from 8/31, and sputum cx   [ ] Continue monitoring blood glucose   [ ] Continue AB started 8/31  [ ] Continue PT/OT  [ ] Next vanco trough 9/3 17:00     Jovita Jim NP   ex [id number] ICU Transfer Note    Transfer from: Neuro ICU    Transfer to: ( x ) Medicine    (  ) Telemetry    (  ) RCU                               (  ) Palliative    (  ) Stroke Unit    (  ) MICU    (  ) __________________    Accepting Physician: signed out to hospitalist [id number] Dr. Chauhan     Signout given to: Dr. Vo ex [id number] at 15:06 pm    HPI / ICU COURSE:    71-year-old diabetic, admitted with HHS/DKA s/p clinical tonic-clonic seizure x2 qualifying for status epilepticus on 8/24/21. Hx of DM, Diabetic foot ulcers, Amputation of toes, PVD, GERD, osteoarthritis, depressions, bipolar, HLD, HTN, and substance abuse. Patient initial glucose >600 pt started on insulin drip with resolution. Pt received Keppra 3g loading dose. Pt was subsequently intubated for airway protection and started on versed gtt, course c/b respiratory failure, distributive shock, encephalopathy, metabolic derangements. Tox + for benzos. Versed continous infusion was discontinued 8/27. Pt was extubated 8/31. During Neuro ICU admission, pt was placed on Veeg. Negative for seizures but vimpat was added to AEDs regime for bifrontal/ FP and stimulus dependent runs of periodic generalized triphasic sharps. CTH/CTA unremarkable. MRI on 8/26 revealing punctate acute infarct involving the left occipital cortex.      Pt spiking fevers on 8/31. Blood cx pre rausch neg, ua neg. New LLL oppacity. Pt started on AB.   PHYSICAL EXAM:    T(F): 98.5 (09-02-21 @ 08:00), Max: 100.1 (09-02-21 @ 00:00)  HR: 102 (09-02-21 @ 11:00) (92 - 116)  BP: 158/63 (09-02-21 @ 11:00) (132/54 - 171/71)  RR: 23 (09-02-21 @ 11:00) (20 - 41)  SpO2: 97% (09-02-21 @ 11:00) (76% - 100%)  GENERAL: NAD, well-developed, confused.   CHEST/LUNG: CTAB  HEART: RRR, no murmurs  ABDOMEN: Soft, Nontender, Nondistended; Bowel sounds present  EXTREMITIES:  2+ Peripheral Pulses, No clubbing, cyanosis, or edema, toe amputations b/l   NEURO: Awake, alert, confused. Pt is not able to state current location. Able to state the current year, his name. Naming intact. EOMS intact. No face droop. Pupils equal and reactive. B/L UE 5/5, b/l LE 5/5. No focal deficits.       I&O's Summary    01 Sep 2021 07:01  -  02 Sep 2021 07:00  --------------------------------------------------------  IN: 2450 mL / OUT: 2345 mL / NET: 105 mL    02 Sep 2021 07:01  -  02 Sep 2021 11:18  --------------------------------------------------------  IN: 500 mL / OUT: 160 mL / NET: 340 mL        LABS:                               9.5    13.34 )-----------( 229      ( 01 Sep 2021 03:07 )             30.2       09-01    140  |  104  |  26<H>  ----------------------------<  219<H>  4.3   |  27  |  0.8    Ca    8.5      01 Sep 2021 03:07  Phos  3.5     09-01  Mg     2.2     09-01    TPro  5.1<L>  /  Alb  2.9<L>  /  TBili  0.4  /  DBili  x   /  AST  31  /  ALT  66<H>  /  AlkPhos  169<H>  09-01      PT/INR - ( 01 Sep 2021 03:07 )   PT: 17.20 sec;   INR: 1.50 ratio         PTT - ( 01 Sep 2021 03:07 )  PTT:31.8 sec      Imaging:    CT Head No Cont (08.28.21 @ 03:09)     IMPRESSION:    In comparison with the prior CT scan of the brain dated August 24, 2021:    The current examination is limited due to streak artifact from the EEG leads.    Otherwise stable examination.    MR Head No Cont (08.26.21 @ 16:15)     IMPRESSION:  Punctate acute infarct involving the left occipital cortex. No evidence of hemorrhage.    Mild chronic microvascular changes.    VEEG 9/25/2021    Seizures-----------  none    Impression:   abnormal due to the focal and generalized slowing and interictal as described above      ASSESSMENT & PLAN:     71-year-old diabetic, admitted with HHS/DKA s/p clinical tonic-clonic seizure x2 qualifying for status epilepticus on keppra 3g/d and versed gtt, course c/b respiratory failure, distributive shock, encephalopathy, metabolic derangements, extubated 8/31.     #Status epilepticus- resolved   - veeg dcd 8/31  - Continue Keppra 1gram q8hrs  - Continue Vimpat 100mg q12hrs    #HHS/DKA- improved  - Passed speech and swallow on 9/1  - calorie count x 2 days   - Sliding scale q AC and HS  - lantus 20 units at night     #Acute encephalopathy  - Continue neuro checks q 8 hours     #Acute respiratory failure- resolved s/p extubation  #LLL opacity / PNA   -C/w Vancomycin, last trough 9/1  - C/w aztreonam   - f/u final read of blood cx 8/31, and sputum sensitivities    - sputum 8/31 + gram neg rods   - duoneb and chest pt q 4   - repeat cxr in 2 days     #Hypertension  - Lisinopril 10 mg PO daily started 9/1     #Small acute L occipital infarct  -C/w aspirin 81 mg daily   -C/w Statin    #Urinary Retention  -Failed TOV x3  -Will transfer with lubin catheter in place  -c/w cardura 4 mg daily   -attempt TOV in 2 days     Full Code       FOR FOLLOW UP:  [ ] F/u Blood cx final result from 8/31, and sputum cx   [ ] Continue monitoring blood glucose   [ ] Continue AB started 8/31  [ ] Continue PT/OT  [ ] Next vanco trough 9/3 17:00     Jovita Jim NP   ex [phone]

## 2021-09-02 NOTE — PROGRESS NOTE ADULT - ATTENDING COMMENTS
History, events, data and scans reviewed, patient examined at bedside. I agree and approved plan of care as outlined above.  NEURO: Continue current AEDs, start on low dose Zyprexa for nocturnal agitation/ICU delerium, neurochecks q 8hr  CVS: On doxazocin (BPH and HTN) and Lisinopril: Monitor renal function, gradually normalize BP  RESP: Possible aspiration, purulent secretions/fever, now improving, continue emperic abx,   GI: Stable, tolerating PO, calorie count in progress, d/c NGT  ENDO: Blood sugar relatively controlled, on high correction sliding scale, change to AC & HS, + lentus:   Prophylaxis: GI/DVT as above.

## 2021-09-02 NOTE — CONSULT NOTE ADULT - SUBJECTIVE AND OBJECTIVE BOX
HPI:  72 y/o M with pmh of dm, osteomyelitis, gerd, depression, hld, htn presenting to the ED for AMS  the patient was doing last night until he was unable to talk and walk independently. As per his ex wife the right sided then started shaking. She called 911.   On presentation the patient had status epilepticus and he received 4 mg of lorazepam.   CT brain done and an acute stroke could not be excluded because of technical difficulties.  His blood glucose was found to be more than 600.   he is admitted for [facility]    the ex wife said that he has bipolar and he was on a medication, although this is not found in the chart. She also does not recall his meds  sp intubation for airway protection, on propofol, INSULI,  ns 150 cc/h. Pt extubated on       PAST MEDICAL & SURGICAL HISTORY:  DM (diabetes mellitus)  18 hgb aic  11.2    HTN (hypertension)    Dyslipidemia    Diabetic foot ulcer    Depression    GERD (gastroesophageal reflux disease)    Diabetes    Toe amputation status, right  ()    History of amputation of toe  LT -partial 1st toe        Hospital Course:    TODAY'S SUBJECTIVE & REVIEW OF SYMPTOMS:     Constitutional WNL   Cardio WNL   Resp WNL   GI WNL  Heme WNL  Endo WNL  Skin WNL  MSK weakness  Neuro WNL  Cognitive confused  Psych WNL      MEDICATIONS  (STANDING):  albuterol/ipratropium for Nebulization 3 milliLiter(s) Nebulizer every 6 hours  aspirin  chewable 81 milliGRAM(s) Oral daily  atorvastatin 20 milliGRAM(s) Oral at bedtime  aztreonam  IVPB 1000 milliGRAM(s) IV Intermittent every 8 hours  chlorhexidine 4% Liquid 1 Application(s) Topical <User Schedule>  doxazosin 4 milliGRAM(s) Oral at bedtime  enoxaparin Injectable 40 milliGRAM(s) SubCutaneous daily  insulin glargine Injectable (LANTUS) 20 Unit(s) SubCutaneous at bedtime  insulin lispro (ADMELOG) corrective regimen sliding scale   SubCutaneous Before meals and at bedtime  lacosamide 100 milliGRAM(s) Oral two times a day  levETIRAcetam 1000 milliGRAM(s) Oral <User Schedule>  lisinopril 10 milliGRAM(s) Oral daily  OLANZapine 5 milliGRAM(s) Oral at bedtime  pantoprazole   Suspension 40 milliGRAM(s) Oral daily  senna 2 Tablet(s) Oral at bedtime  vancomycin  IVPB      vancomycin  IVPB 1000 milliGRAM(s) IV Intermittent two times a day    MEDICATIONS  (PRN):  acetaminophen   Tablet .. 650 milliGRAM(s) Oral every 6 hours PRN Temp greater or equal to 38C (100.4F), Mild Pain (1 - 3), Moderate Pain (4 - 6), Severe Pain (7 - 10)  labetalol Injectable 10 milliGRAM(s) IV Push every 6 hours PRN Systolic blood pressure >      FAMILY HISTORY:  Family history of lung cancer (Mother)    Family history of ischemic heart disease (IHD) (Father)        Allergies    No Known Allergies    Intolerances        SOCIAL HISTORY:    [  ] Etoh  [  ] Smoking  [  ] Substance abuse     Home Environment:  [   ] Home Alone  [x   ] Lives with Family  [   ] Home Health Aid    Dwelling:  [   ] Apartment  [ x  ] Private House  [   ] Adult Home  [   ] Skilled Nursing Facility      [   ] Short Term  [   ] Long Term  [ x  ] Stairs       Elevator [   ]    FUNCTIONAL STATUS PTA: (Check all that apply)  Ambulation: [ x   ]Independent    [   ] Dependent     [   ] Non-Ambulatory  Assistive Device: [   ] SA Cane  [   ]  Q Cane  [   ] Walker  [   ]  Wheelchair  ADL : [ x  ] Independent  [    ]  Dependent       Vital Signs Last 24 Hrs  T(C): 36.7 (02 Sep 2021 12:00), Max: 37.8 (02 Sep 2021 00:00)  T(F): 98.1 (02 Sep 2021 12:00), Max: 100.1 (02 Sep 2021 00:00)  HR: 90 (02 Sep 2021 15:00) (90 - 116)  BP: 129/53 (02 Sep 2021 15:00) (115/46 - 171/71)  BP(mean): 74 (02 Sep 2021 15:00) (68 - 108)  RR: 32 (02 Sep 2021 15:00) (20 - 41)  SpO2: 96% (02 Sep 2021 15:00) (76% - 100%)      PHYSICAL EXAM: Awake & lethargic   GENERAL: NAD  HEAD:  Normocephalic  CHEST/LUNG: Clear   HEART: S1S2+  ABDOMEN: Soft, Nontender  EXTREMITIES:  no calf tenderness, batool wrist restrained    NERVOUS SYSTEM:  Cranial Nerves 2-12 intact [   ] Abnormal  [   ]  ROM: WFL all extremities [   ]  Abnormal [  x ]able to move all ext  Motor Strength: WFL all extremities  [   ]  Abnormal [ x  ]  Sensation: intact to light touch [ x  ] Abnormal [   ]    FUNCTIONAL STATUS:  Bed Mobility: Independent [   ]  Supervision [   ]  Needs Assistance [ x  ]  N/A [   ]  Transfers: Independent [   ]  Supervision [   ]  Needs Assistance [ x  ]  N/A [   ]   Ambulation: Independent [   ]  Supervision [   ]  Needs Assistance [   ]  N/A [   ]  ADL: Independent [   ] Requires Assistance [   ] N/A [   ]      LABS:                        8.9    11.08 )-----------( 207      ( 02 Sep 2021 14:31 )             28.4     09    143  |  106  |  19  ----------------------------<  167<H>  4.1   |  27  |  1.0    Ca    8.1<L>      02 Sep 2021 14:31  Phos  3.5       Mg     2.2         TPro  4.8<L>  /  Alb  2.6<L>  /  TBili  0.4  /  DBili  x   /  AST  33  /  ALT  41  /  AlkPhos  123<H>  02    PT/INR - ( 02 Sep 2021 14:31 )   PT: 13.40 sec;   INR: 1.17 ratio         PTT - ( 02 Sep 2021 14:31 )  PTT:18.1 sec  Urinalysis Basic - ( 31 Aug 2021 16:16 )    Color: Light Yellow / Appearance: Clear / S.020 / pH: x  Gluc: x / Ketone: Negative  / Bili: Negative / Urobili: <2 mg/dL   Blood: x / Protein: Trace / Nitrite: Negative   Leuk Esterase: Negative / RBC: x / WBC x   Sq Epi: x / Non Sq Epi: x / Bacteria: x        RADIOLOGY & ADDITIONAL STUDIES:

## 2021-09-03 DIAGNOSIS — F31.9 BIPOLAR DISORDER, UNSPECIFIED: ICD-10-CM

## 2021-09-03 DIAGNOSIS — F05 DELIRIUM DUE TO KNOWN PHYSIOLOGICAL CONDITION: ICD-10-CM

## 2021-09-03 LAB
CULTURE RESULTS: SIGNIFICANT CHANGE UP
GLUCOSE BLDC GLUCOMTR-MCNC: 111 MG/DL — HIGH (ref 70–99)
GLUCOSE BLDC GLUCOMTR-MCNC: 114 MG/DL — HIGH (ref 70–99)
GLUCOSE BLDC GLUCOMTR-MCNC: 123 MG/DL — HIGH (ref 70–99)
SPECIMEN SOURCE: SIGNIFICANT CHANGE UP

## 2021-09-03 PROCEDURE — 99233 SBSQ HOSP IP/OBS HIGH 50: CPT

## 2021-09-03 PROCEDURE — 99231 SBSQ HOSP IP/OBS SF/LOW 25: CPT

## 2021-09-03 PROCEDURE — 70450 CT HEAD/BRAIN W/O DYE: CPT | Mod: 26

## 2021-09-03 PROCEDURE — 99221 1ST HOSP IP/OBS SF/LOW 40: CPT

## 2021-09-03 RX ORDER — HALOPERIDOL DECANOATE 100 MG/ML
2 INJECTION INTRAMUSCULAR ONCE
Refills: 0 | Status: COMPLETED | OUTPATIENT
Start: 2021-09-03 | End: 2021-09-03

## 2021-09-03 RX ORDER — HALOPERIDOL DECANOATE 100 MG/ML
1 INJECTION INTRAMUSCULAR DAILY
Refills: 0 | Status: DISCONTINUED | OUTPATIENT
Start: 2021-09-03 | End: 2021-09-04

## 2021-09-03 RX ORDER — HALOPERIDOL DECANOATE 100 MG/ML
2 INJECTION INTRAMUSCULAR EVERY 8 HOURS
Refills: 0 | Status: DISCONTINUED | OUTPATIENT
Start: 2021-09-03 | End: 2021-09-09

## 2021-09-03 RX ORDER — LEVETIRACETAM 250 MG/1
1000 TABLET, FILM COATED ORAL EVERY 12 HOURS
Refills: 0 | Status: DISCONTINUED | OUTPATIENT
Start: 2021-09-03 | End: 2021-09-09

## 2021-09-03 RX ORDER — THIAMINE MONONITRATE (VIT B1) 100 MG
500 TABLET ORAL THREE TIMES A DAY
Refills: 0 | Status: DISCONTINUED | OUTPATIENT
Start: 2021-09-03 | End: 2021-09-03

## 2021-09-03 RX ORDER — LACOSAMIDE 50 MG/1
100 TABLET ORAL EVERY 12 HOURS
Refills: 0 | Status: DISCONTINUED | OUTPATIENT
Start: 2021-09-03 | End: 2021-09-09

## 2021-09-03 RX ORDER — HALOPERIDOL DECANOATE 100 MG/ML
2 INJECTION INTRAMUSCULAR
Refills: 0 | Status: DISCONTINUED | OUTPATIENT
Start: 2021-09-03 | End: 2021-09-04

## 2021-09-03 RX ADMIN — OLANZAPINE 5 MILLIGRAM(S): 15 TABLET, FILM COATED ORAL at 21:33

## 2021-09-03 RX ADMIN — Medication 50 MILLIGRAM(S): at 14:00

## 2021-09-03 RX ADMIN — Medication 50 MILLIGRAM(S): at 22:43

## 2021-09-03 RX ADMIN — SENNA PLUS 2 TABLET(S): 8.6 TABLET ORAL at 21:33

## 2021-09-03 RX ADMIN — LISINOPRIL 10 MILLIGRAM(S): 2.5 TABLET ORAL at 05:21

## 2021-09-03 RX ADMIN — LEVETIRACETAM 400 MILLIGRAM(S): 250 TABLET, FILM COATED ORAL at 18:25

## 2021-09-03 RX ADMIN — Medication 250 MILLIGRAM(S): at 09:26

## 2021-09-03 RX ADMIN — ATORVASTATIN CALCIUM 20 MILLIGRAM(S): 80 TABLET, FILM COATED ORAL at 21:33

## 2021-09-03 RX ADMIN — HALOPERIDOL DECANOATE 2 MILLIGRAM(S): 100 INJECTION INTRAMUSCULAR at 08:59

## 2021-09-03 RX ADMIN — HALOPERIDOL DECANOATE 2 MILLIGRAM(S): 100 INJECTION INTRAMUSCULAR at 16:57

## 2021-09-03 RX ADMIN — HALOPERIDOL DECANOATE 2 MILLIGRAM(S): 100 INJECTION INTRAMUSCULAR at 09:22

## 2021-09-03 RX ADMIN — LEVETIRACETAM 1000 MILLIGRAM(S): 250 TABLET, FILM COATED ORAL at 05:21

## 2021-09-03 RX ADMIN — LACOSAMIDE 120 MILLIGRAM(S): 50 TABLET ORAL at 18:25

## 2021-09-03 RX ADMIN — Medication 50 MILLIGRAM(S): at 05:22

## 2021-09-03 RX ADMIN — CHLORHEXIDINE GLUCONATE 1 APPLICATION(S): 213 SOLUTION TOPICAL at 05:21

## 2021-09-03 RX ADMIN — INSULIN GLARGINE 20 UNIT(S): 100 INJECTION, SOLUTION SUBCUTANEOUS at 21:31

## 2021-09-03 RX ADMIN — LACOSAMIDE 100 MILLIGRAM(S): 50 TABLET ORAL at 05:22

## 2021-09-03 RX ADMIN — Medication 250 MILLIGRAM(S): at 21:26

## 2021-09-03 RX ADMIN — Medication 4 MILLIGRAM(S): at 21:33

## 2021-09-03 NOTE — BEHAVIORAL HEALTH ASSESSMENT NOTE - NSBHCONSULTRECOMMENDOTHER_PSY_A_CORE FT
- Continue to treat underlying medical conditions.   - For delirium, haldol 2 mg PO at 6:00, 1 mg at 13:00, 2 mg before bedtime, with escalation to IM if pt refusing PO. If IM antipsychotic is administered, please perform follow-up ECG for QTc monitoring daily.  - For severe agitation not responding to behavioral intervention, may give haldol 2 mg po q8h prn with escalation to IM if patient refusing PO and remains an imminent danger to self or others. If IM antipsychotic is administered, please perform follow-up ECG for QTc monitoring daily. Please do not exceed total haldol dosage of 10 mg per day.   - Hold off on other psychotropic medications at this time.   - Nursing monitor 1:1 strongly recommended for pt being at potential fall risk, lack of PO intake and agitation risk.   - Psychiatry will follow-up tomorrow, 9/4/2021.

## 2021-09-03 NOTE — BEHAVIORAL HEALTH ASSESSMENT NOTE - ADULT OR CHILD PROTECTIVE SERVICES INVOLVEMENT
Neurology Progress Note    NAME:  Rocio Brian   :   1944   MRN:   [id number]     Date/Time:  2021  Subjective: Rocio Brian is a 76 y.o. female here today for follow-up for pain, numbness or tingling sensation leg pain and back pain, anxiety, neuropathy, improved. Patient was accompanied by  her sister to the visit. She says the ear pain has improved, patient said that the medication helped a lot. She does not feel the pain anymore. I told patient to finish up the Tegretol at hat she has by gradually   tapering off tegretol  She says she is generally doing fairly well. I encouraged patient to engage in some exercise program  The sharp pain in the head has decreased a lot. She says  once in a while she experiences a sharp head pain with dizziness but has not gotten any worse since. She says she continues to monitor her blood pressure which has been doing fairly well since the last visit. She says she occasionally experiences back pain, back pain is intermittent, sharp in nature, burning sensation that goes down to the legs causing weakness, numbness and tingling sensation of the legs. She however denies any fall, dysphagia or odynophagia.   Review of Systems - General ROS: positive for  - fatigue and sleep disturbance  Psychological ROS: positive for - anxiety, depression and sleep disturbances  Ophthalmic ROS: positive for - blurry vision and uses glasses  ENT ROS: positive for - headaches, tinnitus, vertigo and visual changes  Allergy and Immunology ROS: negative  Hematological and Lymphatic ROS: negative  Endocrine ROS: negative  Respiratory ROS: no cough, shortness of breath, or wheezing  Cardiovascular ROS: no chest pain or dyspnea on exertion  Gastrointestinal ROS: no abdominal pain, change in bowel habits, or black or bloody stools  Genito-Urinary ROS: no dysuria, trouble voiding, or hematuria  Musculoskeletal ROS: positive for - gait disturbance, joint pain, joint stiffness, joint swelling, muscle pain, muscular weakness and pain in back - bilateral and leg - bilateral  Neurological ROS: positive for - dizziness, gait disturbance, headaches, numbness/tingling and weakness  Dermatological ROS: negative           Medications reviewed:  Current Outpatient Medications   Medication Sig Dispense Refill    furosemide (LASIX) 20 mg tablet TAKE 1 TABLET BY MOUTH EVERY DAY 90 Tab 0    carBAMazepine XR (TEGretol XR) 100 mg SR tablet Take 1 Tab by mouth two (2) times a day. 60 Tab 1    potassium chloride SR (KLOR-CON 10) 10 mEq tablet TAKE 1 TABLET BY MOUTH EVERY DAY 90 Tab 0    escitalopram oxalate (LEXAPRO) 10 mg tablet Take 1 Tab by mouth daily. 90 Tab 2    verapamil ER (VERELAN) 120 mg ER capsule TAKE 1 CAPSULE BY MOUTH EVERY DAY 90 Cap 3    diazePAM (VALIUM) 10 mg tablet TAKE 1 TAB BY MOUTH EVERY TWELVE (12) HOURS AS NEEDED FOR ANXIETY. MAX DAILY AMOUNT: 20 MG. 60 Tab 2    tiotropium-olodateroL (STIOLTO RESPIMAT) 2.5-2.5 mcg/actuation inhaler Stiolto Respimat 2.5 mcg-2.5 mcg/actuation solution for inhalation      ascorbic acid (VITAMIN C PO) Take  by mouth as needed.  chlorthalidone (HYGROTEN) 25 mg tablet       folic acid (FOLVITE) 1 mg tablet Take 1 Tab by mouth daily. 30 Tab 3    OTHER Lumbar brace  Lumbar stenosis 1 Units 0    levothyroxine (SYNTHROID) 75 mcg tablet Take  by mouth daily. 0    cholecalciferol, vitamin D3, (VITAMIN D3) 2,000 unit tab Take  by mouth daily.  aspirin delayed-release 81 mg tablet Take  by mouth daily.  hydroxychloroquine (PLAQUENIL) 200 mg tablet Take 200 mg by mouth daily.  methotrexate (RHEUMATREX) 2.5 mg tablet Take  by mouth every Sunday. 6 capsules      Oxygen nightly.  2 liters      predniSONE (DELTASONE) 10 mg tablet TAKE 1 TABLET BY MOUTH TWICE A DAY 30 Tab 0    clobetasol (TEMOVATE) 0.05 % topical cream apply to affected area twice a day externally  0    levomefolate-B6-meB12-algal oil (METANX, ALGAL OIL,) 3 mg-35 mg-2 mg -90.314 mg cap Take 1 Cap by mouth daily. 30 Cap 3    lisinopril-hydroCHLOROthiazide (PRINZIDE, ZESTORETIC) 10-12.5 mg per tablet take 1 tablet by mouth once daily  0    diclofenac (VOLTAREN) 1 % gel   0    scopolamine (TRANSDERM-SCOP) 1 mg over 3 days pt3d 1 Patch by TransDERmal route every seventy-two (72) hours. 10 Patch 2    pregabalin (LYRICA) 75 mg capsule Take 1 Cap by mouth two (2) times a day. Max Daily Amount: 150 mg. 60 Cap 1    nortriptyline (PAMELOR) 10 mg capsule Take 1 Cap by mouth nightly. 30 Cap 1    HYDROcodone-acetaminophen (NORCO) 7.5-325 mg per tablet Take 1 Tab by mouth every eight (8) hours as needed for Pain. Max Daily Amount: 3 Tabs. 15 Tab 0    valACYclovir (VALTREX) 1 gram tablet Take 1 Tab by mouth daily. 30 Tab 1    meclizine (ANTIVERT) 25 mg tablet Take 1 Tab by mouth nightly. 60 Tab 3    TIOTROPIUM BROMIDE (SPIRIVA WITH HANDIHALER IN) Take 2 Puffs by inhalation daily.           Objective:   Vitals:  Vitals:    05/11/21 1012   BP: (!) 145/83   Pulse: 71   Resp: 18   SpO2: 96%   Weight: 218 lb 6.4 oz (99.1 kg)   PainSc:   0 - No pain               Lab Data Reviewed:  Lab Results   Component Value Date/Time    WBC 9.0 02/13/2019 11:52 AM    HCT 40.8 02/13/2019 11:52 AM    HGB 13.1 02/13/2019 11:52 AM    PLATELET 237 40/38/1216 11:52 AM       No results found for: NA, K, CL, CO2, GLU, BUN, CREA, CA    No components found for: TROPQUANT    No results found for: PAMELA      No results found for: HBA1C, HGBE8, LQU2SRCJ, CBR7FVAH     Lab Results   Component Value Date/Time    Vitamin B12 426 09/11/2018 10:38 AM       No results found for: PAMELA, ANARX, ANAIGG, XBANA    No results found for: CHOL, CHOLPOCT, CHOLX, CHLST, CHOLV, HDL, HDLPOC, HDLP, LDL, LDLCPOC, LDLC, DLDLP, VLDLC, VLDL, TGLX, TRIGL, TRIGP, TGLPOCT, CHHD, CHHDX      CT Results (recent):  Results from Hospital Encounter encounter on 02/08/21   CT NECK SOFT TISSUE W CONT    Narrative EXAM:  CT NECK SOFT TISSUE W CONT    CLINICAL INFORMATION: Acute left otalgia  COMPARISON:  Brain MRI of 7/20/2020     TECHNIQUE: Axial neck CT was performed  following the IV injection of 100 cc  Isovue-370. Coronal  and sagittal and sagittal reformatted images were  generated. CT dose reduction was achieved through the use of a standardized  protocol tailored for this examination and automatic exposure control for dose  modulation. FINDINGS:    VISUALIZED ORBITS, PARANASAL SINUSES, AND SKULL BASE: No abnormalities in the  visualized portion of the brain. Skull base intact. Well-circumscribed  extraconal mass in the inferior left orbit measuring approximately 11 mm AP, 14  mm transverse, and 8 mm craniocaudad. This can be identified retrospectively on  the previous brain MRIs and appeared cystic, without change since 2017. This is  likely benign and incidental. Mastoid air cells and tympanic cavities appear  clear. NASOPHARYNX:  Within normal limits. SUPRAHYOID NECK:  No significant abnormalities in the  oropharynx, oral cavity,  parapharyngeal space, and retropharyngeal space. INFRAHYOID NECK:  Normal appearing larynx and  hypopharynx. THYROID:  No nodule. SALIVARY GLANDS: Within normal limits. THORACIC INLET: No adenopathy. Focal pleural-based density in the anterior left  upper lobe possible area of parenchymal scarring although a focal mass cannot be  excluded. Dimensions of approximately 1.8 x 1.2 x 1.1 cm. Lung apices otherwise  clear. LYMPH NODES: No lymph node enlargement or heterogeneity. VASCULAR STRUCTURES:  Patent. BONES: No significant abnormalities. OTHER FINDINGS:  None. Impression 1. No etiology of left otalgia demonstrated. 2. Cystic lesion in the extraconal inferior left orbit, likely incidental.  3. Irregular density in the anterior left upper lobe, not characterized on this  study. Comparison with any prior cross-sectional imaging of the chest is  recommended.  If none is available, further evaluation is recommended. This could  be performed with PET CT. 23X           MRI Results (recent):  Results from [facility] encounter on 07/20/20   MRI BRAIN W WO CONT    Narrative Clinical history: Dizziness and giddiness  INDICATION:   Dizziness and giddiness    COMPARISON: 5/16/2018, 4/19/2017  TECHNIQUE: MR examination of the brain includes axial and sagittal T1 , axial  T2, axial FLAIR, axial gradient echo, axial DWI, coronal T1 . Pre and post  contrast axial T1-weighted imaging. Postcontrast T1-weighted imaging coronal  plane. CONTRAST: The patient was administered gadolinium-based contrast material,  subsequently axial and sagittal T1-weighted postcontrast imaging was obtained. FINDINGS:   There is no intracranial mass, hemorrhage or acute infarction. Confluent periventricular and scattered foci of increased T2 signal intensity  corona radiata consistent  Not significantly changed. Trace fluid in the inferior mastoid air cell on the  left. IACs are symmetric in size. The left vertebral artery slightly larger than  the right. Mild mucoperiosteal thickening in the right and left maxillary  sinuses. . There is no abnormal parenchymal enhancement. There is no abnormal  meningeal enhancement demonstrated. The brain architecture is normal. There is  no evidence of midline shift or mass-effect. The ventricles are normal in size,  position and configuration. There are no extra-axial fluid collections. Major  intracranial vascular flow-voids are unremarkable. The orbits are grossly  symmetric. Dural venous sinuses are grossly unremarkable. There is no Chiari or  syrinx. Pituitary and infundibulum grossly unremarkable. Cerebellopontine angles  are unremarkable. No skull base mass is identified. Cavernous sinuses are  symmetric. The mastoid air cells and are well pneumatized and clear. Impression IMPRESSION:  Mild/moderate chronic microvascular ischemic change.    Trace paranasal sinus disease. No intracranial mass, hemorrhage or evidence of acute infarction. IR Results (recent):  No results found for this or any previous visit. VAS/US Results (recent):  No results found for this or any previous visit. PHYSICAL EXAM:  General:    Alert, cooperative, no distress, appears stated age. Head:   Normocephalic, without obvious abnormality, atraumatic. Eyes:   Conjunctivae/corneas clear. PERRLA  Nose:  Nares normal. No drainage or sinus tenderness. Throat:    Lips, mucosa, and tongue normal.  No Thrush  Neck:  Supple, symmetrical,  no adenopathy, thyroid: non tender    no carotid bruit and no JVD. Back:    Symmetric, bilateral tenderness. Lungs:   Clear to auscultation bilaterally. No Wheezing or Rhonchi. No rales. Chest wall:  No tenderness or deformity. No Accessory muscle use. Heart:   Regular rate and rhythm,  no murmur, rub or gallop. Abdomen:   Soft, non-tender. Not distended. Bowel sounds normal. No masses  Extremities: Extremities normal, atraumatic, No cyanosis. No edema. No clubbing  Skin:     Texture, turgor normal. No rashes or lesions. Not Jaundiced  Lymph nodes: Cervical, supraclavicular normal.  Psych:  Good insight. Not depressed. Anxious. NEUROLOGICAL EXAM:  Appearance: The patient is well developed, well nourished, provides a coherent history and is in no acute distress. Mental Status: Oriented to time, place and person. Mood and affect appropriate. Cranial Nerves:   Intact visual fields. Fundi are benign. GITA, EOM's full, no nystagmus, no ptosis. Facial sensation is normal. Corneal reflexes are intact. Facial movement is symmetric. Hearing is normal bilaterally. Palate is midline with normal sternocleidomastoid and trapezius muscles are normal. Tongue is midline. Motor:  5/5 strength in upper extremity and 5-/5 lower extremity proximal and distal muscles. Normal bulk and tone. No fasciculations.    Reflexes:   Deep tendon reflexes 2+/4 and symmetrical.   Sensory:    Decreased sensation  to touch, pinprick and vibration bilateral lower extremity up to the knee upper extremity in glove distribution. Gait:  Normal gait. Tremor:    Mild tremor noted. Cerebellar:  No cerebellar signs present. Neurovascular:  Normal heart sounds and regular rhythm, peripheral pulses intact, and no carotid bruits. Assesment  1. Polyneuropathy  Continue Neurontin    2. Neuralgia of left glossopharyngeal nerve  Improved    3. Migraine without status migrainosus, not intractable, unspecified migraine type  Stable    4. Chronic bilateral low back pain without sciatica  Physical therapy    5. POORNIMA (generalized anxiety disorder)  Stable    6. Paresthesia  Stable    ___________________________________________________  PLAN: Medication and plan discussed with patient      ICD-10-CM ICD-9-CM    1. Polyneuropathy  G62.9 356.9    2. Neuralgia of left glossopharyngeal nerve  G52.1 352.1    3. Migraine without status migrainosus, not intractable, unspecified migraine type  G43.909 346.90    4. Chronic bilateral low back pain without sciatica  M54.5 724.2     G89.29 338.29    5. POORNIMA (generalized anxiety disorder)  F41.1 300.02    6. Paresthesia  R20.2 782.0      Follow-up and Dispositions    · Return in about 4 months (around 9/11/2021).            ___________________________________________________    Attending Physician: Petrona Velazco MD Unable to assess

## 2021-09-03 NOTE — BEHAVIORAL HEALTH ASSESSMENT NOTE - NSBHCONSULTOBSREASON_PSY_A_CORE FT
Nursing monitor 1:1 strongly recommended for pt being at potential fall risk, lack of PO intake and agitation risk.

## 2021-09-03 NOTE — BEHAVIORAL HEALTH ASSESSMENT NOTE - CASE SUMMARY
On evaluation, notable underlying hyperactive activities in association with delirium. The paranoid and accusatory behaviors that this patient exhibits are not components of bipolar symptoms exacerbation, but rather symptoms of delirium, more likely due to his underlying medical condition. He was observed to be physically aggressive and further threatened the team of either kicking if anyone was to approach closer. Despite his ability to threaten others, most of his statements were illogical with very poor insight into his current situation. There is no manic symptoms observed, but overt presence of poor memory, poor concentration and alteration of consciousness, all indicative of delirium. For now we recommend standing Haldol 2mg po Am, 1mg po at around 1 pm and 2 mg at around 9pm. This is to be given orally or IM to patient due to delirium and violent behavior. As needed doses 2mg po/IM  every 8hrs prn can be added, monitor QTc closely while on antipsychotic medications. If QTc is >500ms, please hold all psychotropic medications. Consider nursing 1:1 for this patient. Psychiatry service will follow up on 9/4/21.

## 2021-09-03 NOTE — BEHAVIORAL HEALTH ASSESSMENT NOTE - HPI (INCLUDE ILLNESS QUALITY, SEVERITY, DURATION, TIMING, CONTEXT, MODIFYING FACTORS, ASSOCIATED SIGNS AND SYMPTOMS)
Tariq Ceja is a 71 Black M , domiciled in a private residence, with past psychiatric history of bipolar disorder, as per chart review- past medical history of DM, osteomyelitis, amputation of left first toe and all right toes, GERD, HLD, HTN, and substance abuse disorder, who was brought to the ED for AMS, aphasia and dysarthria s/p a stroke (positive MRI). His blood glucose was found to be >600 and he was subsequently admitted for hyperglycemic hyperosmolar syndrome on 8/24/2021. The pt had 2 witnessed seizures the day of his admission and was given 2 doses of Ativan 2 mg and Keppra 150 mg IV q2h was initiated. Psychiatry was consulted for pt becoming agitated, paranoid and combative towards staff since yesterday (received Zyprexa 5 mg IM for agitation on 9/2, two doses of Haldol 2 mg today at 9:00 during a code grey).     Upon approach, the pt was lying in bed with 2-point soft restraints. His affect was labile and his gown was rolled up to his waist with a blanket covering his unclothed lower torso and extremities. He was minimally cooperative throughout the interview, and although verbal, his thought process was observed to be illogical. His responses were incomprehensible and inappropriate with the topics of the questions; at one point, he made utterances about "these kids... calling me a fool." He denied that he was speaking about children of his own, and when questioned about his medications, he muttered "3000 tablets." Pt appeared to have poor dentition, missing multiple teeth, and poorly enunciated his words. He was restless and rolled his head almost constantly, but made eye contact with the writer at some points of the interview. He was irritable, verbally aggressive (cursing at another staff member to leave him alone) and told the writer to "get out of his face." Although the pt was unable to report whether he endorsed delusions, feelings of paranoia, or AVH, it was ascertained that the patient endorsed overt suspicion towards anyone who approached him.    At a later evaluation, the pt was still agitated and aggressive, kicking at any staff who approached him. As per PCA, he kicked 3 staff members, sending one to the ED. As per medicine team, the pt had improved somewhat on Haldol, but is refusing PO medications, including Ativan for epilepsy. Collateral could not be obtained from his family members. Social history could not be obtained, but the pt was last dispensed Percocet on 11/2/2020. Urine toxicology was positive for benzodiazepines at 9:28 on 8/24/2020, but this was may be reflective of Versed ordered at 1:46 (reportedly administered at 13:00 and 18:00 as per Worklist Manager).    As per [facility] [phone], the pt has not received any psychotropic medications since 10/2020 (Zoloft 100 mg qd). The pt has received medications from this pharmacy since 1995, including his diabetic medications last month. Tariq Ceja is a 71 Black M , domiciled in a private residence, with past psychiatric history of bipolar disorder, as per chart review- past medical history of DM, osteomyelitis, amputation of left first toe and all right toes, GERD, HLD, HTN, and substance abuse disorder, who was brought to the ED for AMS, aphasia and dysarthria s/p a stroke (positive MRI). His blood glucose was found to be >600 and he was subsequently admitted for hyperglycemic hyperosmolar syndrome on 8/24/2021. The pt had 2 witnessed seizures the day of his admission and was given 2 doses of Ativan 2 mg and Keppra 150 mg IV q2h was initiated. Psychiatry was consulted for pt becoming agitated, paranoid and combative towards staff since yesterday (received Zyprexa 5 mg IM for agitation on 9/2, two doses of Haldol 2 mg today at 9:00 during a code grey).     Upon approach, the pt was lying in bed with 2-point soft restraints. His affect was labile and his gown was rolled up to his waist with a blanket covering his unclothed lower torso and extremities. He was minimally cooperative throughout the interview, and although verbal, his thought process was observed to be illogical. His responses were incomprehensible and inappropriate with the topics of the questions; at one point, he made utterances about "these kids... calling me a fool." He denied that he was speaking about children of his own, and when questioned about his medications, he muttered "3000 tablets." Pt appeared to have poor dentition, missing multiple teeth, and poorly enunciated his words. He was restless and rolled his head almost constantly, but made eye contact with the writer at some points of the interview. He was irritable, verbally aggressive (cursing at another staff member to leave him alone) and told the writer to "get out of his face." Although the pt was unable to report whether he endorsed delusions, feelings of paranoia, or auditory and visual hallucition it was ascertained that the patient endorsed overt suspicion towards anyone who approached him.    At a later evaluation, the pt was still agitated and aggressive, kicking at any staff who approached him. As per PCA, he kicked 3 staff members, sending one to the ED. As per medicine team, the pt had improved somewhat on Haldol, but is refusing PO medications, including Ativan for epilepsy. Collateral could not be obtained from his family members. Social history could not be obtained, but the pt was last dispensed Percocet on 11/2/2020. Urine toxicology was positive for benzodiazepines at 9:28 on 8/24/2020, but this was may be reflective of Versed ordered at 1:46 (reportedly administered at 13:00 and 18:00 as per Worklist Manager).    As per [facility] [phone], the pt has not received any psychotropic medications since 10/2020 (Zoloft 100 mg qd). The pt has received medications from this pharmacy since 1995, including his diabetic medications last month.

## 2021-09-03 NOTE — PROGRESS NOTE ADULT - ATTENDING COMMENTS
Pt was reportedly agitated, not cooperating with exam during ACP examination, however noted to be moving all extremities. Did not cooperate with visual fields testing.   On my examination pt is completely sedated, difficult to arouse.  MRI brain with small left occipital cortical infarct. Possibly cardioembolic in etiology. CTA head/neck with diminutive VB system, functional b/l fetal PCA, left VA smaller than right, occluded at terminus, basilar being fed predominately by right VA. No significant ICA stenosis appreciated.  Recommend ASA/statin. Consider loop recorder placement.

## 2021-09-03 NOTE — BEHAVIORAL HEALTH ASSESSMENT NOTE - SUICIDE RISK FACTORS
Alcohol/Substance abuse disorders/Agitation/Severe Anxiety/Panic/Unable to engage in safety planning/Mood Disorder current/past

## 2021-09-03 NOTE — BEHAVIORAL HEALTH ASSESSMENT NOTE - NSBHCHARTREVIEWVS_PSY_A_CORE FT
Vital Signs Last 24 Hrs  T(C): 37.1 (03 Sep 2021 05:00), Max: 37.1 (03 Sep 2021 05:00)  T(F): 98.8 (03 Sep 2021 05:00), Max: 98.8 (03 Sep 2021 05:00)  HR: 111 (03 Sep 2021 05:00) (90 - 111)  BP: 159/69 (03 Sep 2021 05:00) (117/54 - 159/69)  BP(mean): 74 (02 Sep 2021 15:00) (74 - 74)  RR: 20 (03 Sep 2021 05:00) (20 - 32)  SpO2: 96% (02 Sep 2021 15:00) (96% - 96%)

## 2021-09-03 NOTE — SWALLOW BEDSIDE ASSESSMENT ADULT - COMMENTS
SLP attempted to see pt for cognitive linguistic evaluation; pt became agitated and started to yell. Pt combative with PT this AM per PCA; pt not appropriate for therapy at this time.

## 2021-09-03 NOTE — BEHAVIORAL HEALTH ASSESSMENT NOTE - DIFFERENTIAL
Hyperactive delirium vs Post-ictal psychosis vs Bipolar Disorder exacerbation Hyperactive delirium vs Post-ictal psychosis   Bipolar Disorder by history.

## 2021-09-03 NOTE — PROGRESS NOTE ADULT - ASSESSMENT
71-year-old diabetic, admitted with HHS/DKA s/p clinical tonic-clonic seizure x2 qualifying for status epilepticus on keppra 3g/d and versed gtt, course c/b respiratory failure, distributive shock, encephalopathy, metabolic derangements, extubated 8/31.     #Encephalopathy ?etiology  - repeat REEG  - check ammonia  - psych c/s  - antipsychotics as per psych  -repeat CTH  -neuro f/u  -if no improvement, might need LP    #Status epilepticus- resolved   - veeg dcd 8/31  - Continue Keppra 1gram q8hrs  - Continue Vimpat 100mg q12hrs    #HHS/DKA- improved  - Passed speech and swallow on 9/1  - calorie count x 2 days   - Sliding scale q AC and HS  - lantus 20 units at night     #Acute respiratory failure- resolved s/p extubation  #LLL opacity / PNA   -C/w Vancomycin, last trough 9/1  - C/w aztreonam   - f/u final read of blood cx 8/31, and sputum sensitivities    - sputum 8/31 + gram neg rods   - duoneb and chest pt q 4   - repeat cxr in 2 days   - ID eval     #Hypertension  - Lisinopril 10 mg PO daily started 9/1     #Small acute L occipital infarct  -C/w aspirin 81 mg daily   -C/w Statin  -check LDL  -ILR once mental status improves    #Urinary Retention  -Failed TOV x3  -c/w cardura 4 mg daily   -attempt TOV in 2 days     Full Code     High risk pt. Px is guarded.    #Progress Note Handoff  Pending: Neuro, Psych,IDs_CTH, ?LP; Resolution of encephalopathy  Disposition: Home______/SNF_______/4A______/To be determined____x____    My note supersedes the residents note should a discrepancy arise.    Chart and notes personally reviewed.  Care Discussed with Consultants/Other Providers/ Housestaff [ x] YES [ ] NO   Radiology, labs, old records personally reviewed.    discussed w/ houstaff, nursing, case management, neuro team

## 2021-09-03 NOTE — CHART NOTE - NSCHARTNOTEFT_GEN_A_CORE
Pt combative earlier today  CT findings on 9/3 of mastoiditis  NGT out and lunch tray at bedside, ? adequacy of PO intake    T(F): 98.8 (09-03-21 @ 05:00), Max: 98.8 (09-03-21 @ 05:00)  HR: 111 (09-03-21 @ 05:00) (94 - 111)  BP: 159/69 (09-03-21 @ 05:00) (117/54 - 159/69)  RR: 20 (09-03-21 @ 05:00) (20 - 24)    I&O's Detail    02 Sep 2021 07:01  -  03 Sep 2021 07:00  --------------------------------------------------------  IN:    IV PiggyBack: 300 mL    Oral Fluid: 560 mL    sodium chloride 0.9%: 200 mL  Total IN: 1060 mL    OUT:    Indwelling Catheter - Urethral (mL): 580 mL  Total OUT: 580 mL    Total NET: 480 mL    03 Sep 2021 07:01  -  03 Sep 2021 15:15  --------------------------------------------------------  IN:  Total IN: 0 mL    OUT:    Indwelling Catheter - Urethral (mL): 300 mL  Total OUT: 300 mL    Total NET: -300 mL    09-02    143  |  106  |  19  ----------------------------<  167<H>  4.1   |  27  |  1.0    Ca    8.1<L>      02 Sep 2021 14:31    TPro  4.8<L>  /  Alb  2.6<L>  /  TBili  0.4  /  DBili  x   /  AST  33  /  ALT  41  /  AlkPhos  123<H>  09-02                        8.9    11.08 )-----------( 207      ( 02 Sep 2021 14:31 )             28.4     CAPILLARY BLOOD GLUCOSE  POCT Blood Glucose.: 201 mg/dL (02 Sep 2021 21:12)  POCT Blood Glucose.: 183 mg/dL (02 Sep 2021 16:39)     IMP:  - status epilepticus  - intubated for airway protection  - DKA     est REE ~ 2140 kcal & protein needs per CCM ~ 135 gm/d    PLAN:  - PO diet as tolerated  - check neville cts X 3d  - glycemic control, poc glucose and insulin treatments should also be pre-feed Pt combative earlier today, calmer when seen but confused  CT findings on 9/3 of mastoiditis  NGT out and lunch tray at bedside, ? adequacy of PO intake  abd soft, NT    T(F): 98.8 (09-03-21 @ 05:00), Max: 98.8 (09-03-21 @ 05:00)  HR: 111 (09-03-21 @ 05:00) (94 - 111)  BP: 159/69 (09-03-21 @ 05:00) (117/54 - 159/69)  RR: 20 (09-03-21 @ 05:00) (20 - 24)    I&O's Detail    02 Sep 2021 07:01  -  03 Sep 2021 07:00  --------------------------------------------------------  IN:    IV PiggyBack: 300 mL    Oral Fluid: 560 mL    sodium chloride 0.9%: 200 mL  Total IN: 1060 mL    OUT:    Indwelling Catheter - Urethral (mL): 580 mL  Total OUT: 580 mL    Total NET: 480 mL    03 Sep 2021 07:01  -  03 Sep 2021 15:15  --------------------------------------------------------  IN:  Total IN: 0 mL    OUT:    Indwelling Catheter - Urethral (mL): 300 mL  Total OUT: 300 mL    Total NET: -300 mL    09-02    143  |  106  |  19  ----------------------------<  167<H>  4.1   |  27  |  1.0    Ca    8.1<L>      02 Sep 2021 14:31    TPro  4.8<L>  /  Alb  2.6<L>  /  TBili  0.4  /  DBili  x   /  AST  33  /  ALT  41  /  AlkPhos  123<H>  09-02                        8.9    11.08 )-----------( 207      ( 02 Sep 2021 14:31 )             28.4     CAPILLARY BLOOD GLUCOSE  POCT Blood Glucose.: 201 mg/dL (02 Sep 2021 21:12)  POCT Blood Glucose.: 183 mg/dL (02 Sep 2021 16:39)     IMP:  - status epilepticus  - intubated for airway protection  - DKA     est REE ~ 2140 kcal & protein needs per CCM ~ 135 gm/d    PLAN:  - PO diet as tolerated  - check neville cts X 3d  - glycemic control, poc glucose and insulin treatments should also be pre-feed  - if po intake < 50% at  meals, should place small bore NG tube, continue to encourage po intake, then give post-prandial enteral feedings to meet needs

## 2021-09-03 NOTE — PROGRESS NOTE ADULT - ASSESSMENT
71-year-old diabetic, admitted with HHS/DKA s/p clinical tonic-clonic seizure x2 qualifying for status epilepticus on keppra 3g/d and versed gtt resolved, course c/b respiratory failure, distributive shock, encephalopathy, metabolic derangements, extubated 8/31. Currently he is delirious and agitated and not cooperative with exam.  MRI brain form 8/26 was noted for left occipital cortical infarct. Patient was on Aspirin 81 mg at home and Statin 20mg. No significant athero on CTA head and neck.    Plan:  -Add Plavix 75mg to Aspirin 81  -Increase Statin to 40mg  -Check lipid profile, HA1C  -Consider ILR when able  -Follow psych recs for agitation/delirium management      Plan discussed with Dr. Domingo

## 2021-09-03 NOTE — BEHAVIORAL HEALTH ASSESSMENT NOTE - NSBHCHARTREVIEWINVESTIGATE_PSY_A_CORE FT
< from: 12 Lead ECG (08.28.21 @ 05:54) >    Ventricular Rate 59 BPM    Atrial Rate 59 BPM    P-R Interval 150 ms    QRS Duration 84 ms    Q-T Interval 426 ms    QTC Calculation(Bazett) 421 ms

## 2021-09-03 NOTE — PROGRESS NOTE ADULT - SUBJECTIVE AND OBJECTIVE BOX
JOSÉ MANUEL PORTER 71y Male  MRN#: [id number]   Hospital Day: 10d    SUBJECTIVE  Patient is a 71y old Male who presents with a chief complaint of AMS (02 Sep 2021 15:48)  Currently admitted to medicine with the primary diagnosis of Hyperglycemia      INTERVAL HPI AND OVERNIGHT EVENTS:  Patient was examined and seen at bedside. This morning he is extremely combative. Reports that everyone is a , and not to come near him. significant CT findings on 9/3 of mastoiditis.     REVIEW OF SYMPTOMS:  Pt very combative, not willing to answer questions     OBJECTIVE  PAST MEDICAL & SURGICAL HISTORY  DM (diabetes mellitus)  12/27/18 hgb aic  11.2    HTN (hypertension)    Dyslipidemia    Diabetic foot ulcer    Depression    GERD (gastroesophageal reflux disease)    Diabetes    Toe amputation status, right  (2008)    History of amputation of toe  LT -partial 1st toe      ALLERGIES:  No Known Allergies    MEDICATIONS:  STANDING MEDICATIONS  albuterol/ipratropium for Nebulization 3 milliLiter(s) Nebulizer every 6 hours  aspirin  chewable 81 milliGRAM(s) Oral daily  atorvastatin 20 milliGRAM(s) Oral at bedtime  aztreonam  IVPB 1000 milliGRAM(s) IV Intermittent every 8 hours  chlorhexidine 4% Liquid 1 Application(s) Topical <User Schedule>  doxazosin 4 milliGRAM(s) Oral at bedtime  enoxaparin Injectable 40 milliGRAM(s) SubCutaneous daily  insulin glargine Injectable (LANTUS) 20 Unit(s) SubCutaneous at bedtime  insulin lispro (ADMELOG) corrective regimen sliding scale   SubCutaneous Before meals and at bedtime  lacosamide 100 milliGRAM(s) Oral two times a day  levETIRAcetam 1000 milliGRAM(s) Oral <User Schedule>  lisinopril 10 milliGRAM(s) Oral daily  OLANZapine 5 milliGRAM(s) Oral at bedtime  pantoprazole   Suspension 40 milliGRAM(s) Oral daily  senna 2 Tablet(s) Oral at bedtime  vancomycin  IVPB      vancomycin  IVPB 1000 milliGRAM(s) IV Intermittent two times a day    PRN MEDICATIONS  acetaminophen   Tablet .. 650 milliGRAM(s) Oral every 6 hours PRN  labetalol Injectable 10 milliGRAM(s) IV Push every 6 hours PRN      PHYSICAL EXAM:  Very limited exam; pt combative, violent;   S1 S2 appreciable, no murmurs.   Breath sounds (+) in all lung fields  RLE s/p transmetatarsal amputation   LLE s/p toe amputation   poor dentition  eyes anicteric     VITAL SIGNS: Last 24 Hours  T(C): 37.1 (03 Sep 2021 05:00), Max: 37.1 (03 Sep 2021 05:00)  T(F): 98.8 (03 Sep 2021 05:00), Max: 98.8 (03 Sep 2021 05:00)  HR: 111 (03 Sep 2021 05:00) (90 - 111)  BP: 159/69 (03 Sep 2021 05:00) (117/54 - 159/69)  BP(mean): 74 (02 Sep 2021 15:00) (74 - 74)  RR: 20 (03 Sep 2021 05:00) (20 - 32)  SpO2: 96% (02 Sep 2021 15:00) (96% - 96%)    LABS:                        8.9    11.08 )-----------( 207      ( 02 Sep 2021 14:31 )             28.4     09-02    143  |  106  |  19  ----------------------------<  167<H>  4.1   |  27  |  1.0    Ca    8.1<L>      02 Sep 2021 14:31    TPro  4.8<L>  /  Alb  2.6<L>  /  TBili  0.4  /  DBili  x   /  AST  33  /  ALT  41  /  AlkPhos  123<H>  09-02    PT/INR - ( 02 Sep 2021 14:31 )   PT: 13.40 sec;   INR: 1.17 ratio         PTT - ( 02 Sep 2021 14:31 )  PTT:18.1 sec              RADIOLOGY:    < from: CT Head No Cont (09.03.21 @ 10:48) >  IMPRESSION:    No acute intracranial pathology.    Stable mild chronic microvascular ischemic changes.    New moderate left/mild right mastoid effusion and partial effusion in the left middle ear cavity, nonspecific but can be seen with otomastoiditis.

## 2021-09-03 NOTE — PROGRESS NOTE ADULT - ASSESSMENT
HOSPITAL COURSE:    71-year-old diabetic, admitted with HHS/DKA s/p clinical tonic-clonic seizure x2 qualifying for status epilepticus on 8/24/21. Hx of DM, Diabetic foot ulcers, Amputation of toes, PVD, GERD, osteoarthritis, depressions, bipolar, HLD, HTN, and substance abuse. Patient initial glucose >600 pt started on insulin drip with resolution. Pt received Keppra 3g loading dose. Pt was subsequently intubated for airway protection and started on versed gtt, course c/b respiratory failure, distributive shock, encephalopathy, metabolic derangements. Tox + for benzos. Versed continous infusion was discontinued 8/27. Pt was extubated 8/31. During Neuro ICU admission, pt was placed on Veeg. Negative for seizures but vimpat was added to AEDs regime for bifrontal/ FP and stimulus dependent runs of periodic generalized triphasic sharps. CTH/CTA unremarkable. MRI on 8/26 revealing punctate acute infarct involving the left occipital cortex.      Pt spiking fevers on 8/31. Blood cx pre rausch neg, ua neg. New LLL oppacity. Pt started on AB.         ASSESSMENT & PLAN:     HOSPITAL COURSE:    70y M w/ hx of DMII, admitted due to seizure witnessed by wife. Pt admitted to ICU with DKA/HHS. Had seizures consistent with tonic-clonic seizure x2 in ED on 824. Pt was loaded with 3g Keppra and transferred to MICU where he was started on an insulin drip. He was subsequently intubated for airway protection and started on Versed gtt. Course was complicated by respiratory failure, pneumonia, encephalopathy, metabolic derangements. Tox screen was (_+) for benzos. Pt was extubated on . Pt was placed on VEEG which was negative for seizure. However, Vimpat was added to regime due to changes on EEG. Initial CTH/CTA was unremarkable. MRI on  revealed puncate acute infarcts involving L occipital cortex. CT Head on 9/3 revealed effusions consistent with mastoiditis.     ASSESSMENT & PLAN:    # Altered Mental Status vs. Delirium - likely new onset   - per wife, pt is fully functional at baseline   - pt very combative, kicking roommate and staff, not allowing people to come near him (9/3)   - as per psych, pt has delirium  - per psych:  Haldol Standinmg @ 6am, 1mg @ 1pm, 2mg in the evening. Additional PRN: 2mg q8h;   - Daily ECG      #Status epilepticus- resolved   - veeg dcd   - Continue Keppra 1gram q8hrs  - Continue Vimpat 100mg q12hrs    # Non-specific Mastoid Effusion   - CT Head (9/3): Moderate left/mild right mastoid effusion; partial effusion in left middle ear cavity; possibly consistent with otomastoiditis    # HHS/DKA- resolved   - Passed speech and swallow on   - calorie count x 2 days   - Sliding scale q AC and HS  - lantus 20 units at night     #Acute encephalopathy  - Continue neuro checks q 8 hours     # Pneumonia - likely from intubation; resolving   -C/w Vancomycin (last trough ; pt refusing additional troughs); 9/3 is day 4  - C/w aztreonam; 9/3 is day 3  - f/u final read of blood cx , and sputum sensitivities    - sputum  (+) gram neg rods and gram positive cocci   - duoneb and chest pt q 4   - repeat CXR on 9/3    #Small acute L occipital infarct  -C/w aspirin 81 mg daily   -C/w Statin    #Hypertension  - Lisinopril 10 mg PO daily started      #Urinary Retention  -Failed TOV x3; has lubin   -c/w cardura 4 mg daily   -attempt TOV when pt is no long combative/safer

## 2021-09-03 NOTE — BEHAVIORAL HEALTH ASSESSMENT NOTE - NSBHCONSULTMEDS_PSY_A_CORE FT
For delirium, haldol 2 mg PO at 6:00, 1 mg at 13:00, 2 mg before bedtime, with escalation to IM if pt refusing PO. If IM antipsychotic is administered, please perform follow-up ECG for QTc monitoring daily.

## 2021-09-03 NOTE — BEHAVIORAL HEALTH ASSESSMENT NOTE - NSBHCHARTREVIEWIMAGING_PSY_A_CORE FT
< from: MR Head No Cont (08.26.21 @ 16:15) >    Punctate acute infarct involving the left occipital cortex. No evidence of hemorrhage.

## 2021-09-03 NOTE — BEHAVIORAL HEALTH ASSESSMENT NOTE - AXIS III
As per chart review: DM, osteomyelitis, amputation of left first toe and all right toes, GERD, HLD, HTN, and substance abuse disorder.

## 2021-09-03 NOTE — BEHAVIORAL HEALTH ASSESSMENT NOTE - NSBHCONSULTMEDSEVERE_PSY_A_CORE FT
For severe agitation not responding to behavioral intervention, may give haldol 2 mg po q8h prn with escalation to IM if patient refusing PO and remains an imminent danger to self or others. If IM antipsychotic is administered, please perform follow-up ECG for QTc monitoring daily. Please do not exceed total haldol dosage of 10 mg per day.

## 2021-09-03 NOTE — PROGRESS NOTE ADULT - SUBJECTIVE AND OBJECTIVE BOX
71-year-old diabetic, admitted with HHS/DKA s/p clinical tonic-clonic seizure x2 qualifying for status epilepticus on 8/24/21. Hx of DM, Diabetic foot ulcers, Amputation of toes, PVD, GERD, osteoarthritis, depressions, bipolar, HLD, HTN, and substance abuse. Patient initial glucose >600 pt started on insulin drip with resolution. Pt received Keppra 3g loading dose. Pt was subsequently intubated for airway protection and started on versed gtt, course c/b respiratory failure, distributive shock, encephalopathy, metabolic derangements. Tox + for benzos. Versed continous infusion was discontinued 8/27. Pt was extubated 8/31. During Neuro ICU admission, pt was placed on Veeg. Negative for seizures but vimpat was added to AEDs regime for bifrontal/ FP and stimulus dependent runs of periodic generalized triphasic sharps. CTH/CTA unremarkable. MRI on 8/26 revealing punctate acute infarct involving the left occipital cortex. Pt spiking fevers on 8/31. Blood cx pre rausch neg, ua neg. New LLL oppacity. Pt started on AB.     INTERVAL HPI/OVERNIGHT EVENTS: This am was agitated, hit physical therapist and was code grey. Received Haldol. Currently mumbling unintelligibly.    PAST MEDICAL & SURGICAL HISTORY:  DM (diabetes mellitus)  12/27/18 hgb aic  11.2    HTN (hypertension)    Dyslipidemia    Diabetic foot ulcer    Depression    GERD (gastroesophageal reflux disease)    Toe amputation status, right  (2008)    History of amputation of toe  LT -partial 1st toe        General: NAD, alert but confused  HEENT:  EOMI, no LAD  CV: S1 S2  Resp: decreased breath sounds at bases  GI: NT/ND/S +BS  MS: no clubbing/cyanosis/edema, + pulses b/l  Neuro: [place]    MEDICATIONS  (STANDING):  albuterol/ipratropium for Nebulization 3 milliLiter(s) Nebulizer every 6 hours  aspirin  chewable 81 milliGRAM(s) Oral daily  atorvastatin 20 milliGRAM(s) Oral at bedtime  aztreonam  IVPB 1000 milliGRAM(s) IV Intermittent every 8 hours  chlorhexidine 4% Liquid 1 Application(s) Topical <User Schedule>  doxazosin 4 milliGRAM(s) Oral at bedtime  enoxaparin Injectable 40 milliGRAM(s) SubCutaneous daily  haloperidol    Injectable 2 milliGRAM(s) IntraMuscular two times a day  haloperidol    Injectable 1 milliGRAM(s) IntraMuscular daily  insulin glargine Injectable (LANTUS) 20 Unit(s) SubCutaneous at bedtime  insulin lispro (ADMELOG) corrective regimen sliding scale   SubCutaneous Before meals and at bedtime  lacosamide IVPB 100 milliGRAM(s) IV Intermittent every 12 hours  levETIRAcetam  IVPB 1000 milliGRAM(s) IV Intermittent every 12 hours  lisinopril 10 milliGRAM(s) Oral daily  OLANZapine 5 milliGRAM(s) Oral at bedtime  pantoprazole   Suspension 40 milliGRAM(s) Oral daily  senna 2 Tablet(s) Oral at bedtime  vancomycin  IVPB      vancomycin  IVPB 1000 milliGRAM(s) IV Intermittent two times a day    MEDICATIONS  (PRN):  acetaminophen   Tablet .. 650 milliGRAM(s) Oral every 6 hours PRN Temp greater or equal to 38C (100.4F), Mild Pain (1 - 3), Moderate Pain (4 - 6), Severe Pain (7 - 10)  haloperidol    Injectable 2 milliGRAM(s) IntraMuscular every 8 hours PRN agitation/combative/self destructive  labetalol Injectable 10 milliGRAM(s) IV Push every 6 hours PRN Systolic blood pressure >    Vital Signs Last 24 Hrs  T(C): 37.1 (03 Sep 2021 05:00), Max: 37.1 (03 Sep 2021 05:00)  T(F): 98.8 (03 Sep 2021 05:00), Max: 98.8 (03 Sep 2021 05:00)  HR: 111 (03 Sep 2021 05:00) (96 - 111)  BP: 159/69 (03 Sep 2021 05:00) (134/63 - 159/69)  BP(mean): --  RR: 20 (03 Sep 2021 05:00) (20 - 20)  SpO2: --  CAPILLARY BLOOD GLUCOSE      POCT Blood Glucose.: 111 mg/dL (03 Sep 2021 16:27)  POCT Blood Glucose.: 123 mg/dL (03 Sep 2021 10:59)  POCT Blood Glucose.: 201 mg/dL (02 Sep 2021 21:12)                          8.9    11.08 )-----------( 207      ( 02 Sep 2021 14:31 )             28.4     09-02    143  |  106  |  19  ----------------------------<  167<H>  4.1   |  27  |  1.0    Ca    8.1<L>      02 Sep 2021 14:31    TPro  4.8<L>  /  Alb  2.6<L>  /  TBili  0.4  /  DBili  x   /  AST  33  /  ALT  41  /  AlkPhos  123<H>  09-02    LIVER FUNCTIONS - ( 02 Sep 2021 14:31 )  Alb: 2.6 g/dL / Pro: 4.8 g/dL / ALK PHOS: 123 U/L / ALT: 41 U/L / AST: 33 U/L / GGT: x               PT/INR - ( 02 Sep 2021 14:31 )   PT: 13.40 sec;   INR: 1.17 ratio         PTT - ( 02 Sep 2021 14:31 )  PTT:18.1 sec            Chart, Consultant(s) Notes Reviewed:  [x ] YES  [ ] NO  Care Discussed with Consultants/Other Providers/ Housestaff [ x] YES  [ ] NO  Radiology, labs, old available records personally reviewed.

## 2021-09-03 NOTE — BEHAVIORAL HEALTH ASSESSMENT NOTE - RISK ASSESSMENT
Risk factors: impulsivity, affective dysregulation, current and past diagnoses, poor insight/judgment, recent history of aggression (both physical and verbal)   Protective factors: current treatment regimen Unable to determine Suicide Risk

## 2021-09-03 NOTE — BEHAVIORAL HEALTH ASSESSMENT NOTE - NSBHMEDSOTHERFT_PSY_A_CORE
As per [facility] [phone]: Nexium 20 mg PO qd, Actos (pioglitazone) 30 mg PO qd, Trulicity 1.5 mg injection/wk, Crestor 10 mg BID, Metformin 1000 mg BID, Nifedipine 90 mg qd, Tresiba 200 U/ml injection total (50 U/ml qd), Nystatin cream BID, Glimepiride 2 mg qd.

## 2021-09-03 NOTE — BEHAVIORAL HEALTH ASSESSMENT NOTE - ORIENTATION OTHER
Pt appeared to be aware that he was at the hospital and responded to his name, but unable to assess orientation beyond person and place. Pt appeared to be aware that he was at the hospital and responded to his name, however, overtly disoriented.

## 2021-09-03 NOTE — PROGRESS NOTE ADULT - SUBJECTIVE AND OBJECTIVE BOX
NV Progress Note    Interval History:  patient was downgraded from NCC to 3E, he is very confused and agitated and not cooperative with exam, spontaneously moves all extremities. f/u was requested by medicine.    HPI:  70 y/o M with pmh of dm, osteomyelitis, gerd, depression, hld, htn presenting to the ED for above cc.   the patient was doing last night until he was unable to talk and walk independently. As per his ex wife the right sided then started shaking. She called 911.   On presentation the patient had status epilepticus and he received 4 mg of lorazepam.   CT brain done and an acute stroke could not be excluded because of technical difficulties.  His blood glucose was found to be more than 600.   he is admitted for HHS    the ex wife said that he has bipolar and he was on a medication, although this is not found in the chart. She also does not recall his meds  sp intubation for airway protection, on propofol, INSULI, 1/2 ns 150 cc/h (24 Aug 2021 04:34)      PAST MEDICAL & SURGICAL HISTORY:  DM (diabetes mellitus)  12/27/18 hgb aic  11.2    HTN (hypertension)    Dyslipidemia    Diabetic foot ulcer    Depression    GERD (gastroesophageal reflux disease)    Diabetes    Toe amputation status, right  (2008)    History of amputation of toe  LT -partial 1st toe    < from: MR Head No Cont (08.26.21 @ 16:15) >    EXAM:  MR BRAIN            PROCEDURE DATE:  08/26/2021            INTERPRETATION:  INDICATIONS:  Rule out stroke. Aphasia with right-sided weakness.    TECHNIQUE:  Multiplanar imaging was performed using T1 weighted, T2 weighted and FLAIR sequences.  Diffusion weighted and GRE images were also obtained.    COMPARISON EXAMINATION:  CT head dated 8/24/2021.    FINDINGS:  There is a punctate focus of restricted diffusion in the left occipital cortical region.    There is no evidence of intracranial hemorrhage, mass effect or midline shift.    Ventricles and sulci are age-appropriate in size.    There is patchy periventricular and subcortical white matter T2 and FLAIR signal hyperintensities.    There is no extra-axial fluid collection.    Major intracranial flow-voids are preserved.    The visualized orbits are unremarkable. There is left cataract surgery.    The sellar and suprasellar structures, and craniocervical junction are unremarkable.    The calvarium signal intensity is within normal limits.    Visualized paranasal sinuses and tympanomastoid cavities are clear.    IMPRESSION:  Punctate acute infarct involving the left occipital cortex. No evidence of hemorrhage.    Mild chronic microvascular changes.      < from: CT Angio Head w/ IV Cont (08.24.21 @ 01:55) >    EXAM:  CT ANGIO BRAIN (W)AW IC        EXAM:  CT ANGIO NECK (W)AW IC        EXAM:  CT PERFUSION W MAPS IC            PROCEDURE DATE:  08/24/2021            INTERPRETATION:  Clinical History / Reason for exam: Stroke code. Right-sided weakness, a favor that resolved and patient then had a seizure.    TECHNIQUE: Following the intravenous administration of 50 cc of Omnipaque 350 IV contrast, serial axial images were obtained through the brain. The CT perfusion data set was post processed per [facility] protocol generating color maps of CBF (cerebral blood flow), CBV (cerebral blood volume), MTT (mean transit time), and Tmax.    Note: RAPID threshold for ischemic tissue volume is Tmax equal or greater than 6 seconds. Threshold for infarct core volume estimate is CBF equivalent less than 30 percent. Threshold for poor collateral flow or severe delayed perfusion is Tmax equal or greater greater than 10 seconds    CT angiography of the intracranial (head) and extracranial (neck) circulation was then performed after contrast administration. Multiple contiguous axial images from the skull base to vertex were acquired. Maximal intensity projection images were additionally included and reviewed.    Using a separate workstation, 3-D shadedsurface reformations were made of vasculature. 3-D reformations were reviewed and included in interpretation of the official report.    COMPARISON: None    FINDINGS:    PERFUSION:  CBF less than 30% volume: 0 mL.  Tmax greater than 6 seconds: 91 mL  Mismatch volume: 91 mL  Mismatch ratio: Infinite.    Technical quality:  Technical review reveals that the venous reference BRANDON may have partially been placed over a region of bone, resulting in artifact and inaccurate results. The perfusion portion of the exam is therefore nondiagnostic.    HEAD CTA:    The internal carotid arteries demonstrate normal enhancement to the intracranial circulation and [place].    Anterior and middle cerebral arteries demonstrate normal enhancement and arborization without intraluminal filling defect, stenosis, occlusion, aneurysm or vascular malformation.    There is a dominant right vertebral artery. The left vertebral artery predominantly terminates in the pancreatic, with a hypoplastic distal V4 segment thinning to the basilar artery. Branch vasculature of the posterior circulation are within normal limits. The posterior cerebral arteries demonstrate normal enhancement and arborization without evidence for aneurysm, stenosis, occlusion or vascular malformation. Bilateral posterior communicating arteries are present.    Visualized dural venous sinuses and deep cerebral venous structures demonstrate normal enhancement without evidence for filling defect.    NECK CTA:    Streak artifact mildly limits evaluation.    Bovine aortic arch.    Right:  The origin of the right common carotid artery is normal. The right common carotid artery is normal in course and caliber to the carotid bifurcation, and contains minimal atherosclerotic plaque. Thereis mild mixed atherosclerotic plaque at the carotid bulb. Origins of the internal and external carotid arteries are normal by NASCET criteria. The right internal carotid artery has normal course and caliber to the intracranial circulation.    There is mild to moderate stenosis secondary to atherosclerotic plaque at the origin of the right vertebral artery. The right vertebral artery is normal in course and dominant in caliber to the intracranial circulation.    Left: The origin of the left commoncarotid artery is normal. The left common carotid artery is normal in course and caliber to the carotid bifurcation. There is moderate mixed plaque at the carotid bulb without evidence of stenosis. Origins of the internal and external carotid arteries are normal by NASCET criteria. The left internal carotid artery has normal course and caliber to the intracranial circulation.    There is mild stenosis at the left vertebral artery origin secondary to atherosclerotic plaque. The left vertebral artery is normal in course and diminutive and caliber to the intracranial circulation.    IMPRESSION:    PERFUSION:  Nondiagnostic perfusion exam secondary to a software error as detailed above.    CTA HEAD:  No evidence of flow-limiting stenosis, occlusion or aneurysm.    CTA NECK:  No evidence of flow-limiting stenosis, occlusion or aneurysm..        < end of copied text >            < end of copied text >      Medications:  acetaminophen   Tablet .. 650 milliGRAM(s) Oral every 6 hours PRN  albuterol/ipratropium for Nebulization 3 milliLiter(s) Nebulizer every 6 hours  aspirin  chewable 81 milliGRAM(s) Oral daily  atorvastatin 20 milliGRAM(s) Oral at bedtime  aztreonam  IVPB 1000 milliGRAM(s) IV Intermittent every 8 hours  chlorhexidine 4% Liquid 1 Application(s) Topical <User Schedule>  doxazosin 4 milliGRAM(s) Oral at bedtime  enoxaparin Injectable 40 milliGRAM(s) SubCutaneous daily  haloperidol    Injectable 2 milliGRAM(s) IntraMuscular two times a day  haloperidol    Injectable 1 milliGRAM(s) IntraMuscular daily  haloperidol    Injectable 2 milliGRAM(s) IntraMuscular every 8 hours PRN  insulin glargine Injectable (LANTUS) 20 Unit(s) SubCutaneous at bedtime  insulin lispro (ADMELOG) corrective regimen sliding scale   SubCutaneous Before meals and at bedtime  labetalol Injectable 10 milliGRAM(s) IV Push every 6 hours PRN  lacosamide IVPB 100 milliGRAM(s) IV Intermittent every 12 hours  levETIRAcetam  IVPB 1000 milliGRAM(s) IV Intermittent every 12 hours  lisinopril 10 milliGRAM(s) Oral daily  OLANZapine 5 milliGRAM(s) Oral at bedtime  pantoprazole   Suspension 40 milliGRAM(s) Oral daily  senna 2 Tablet(s) Oral at bedtime  vancomycin  IVPB      vancomycin  IVPB 1000 milliGRAM(s) IV Intermittent two times a day      Vital Signs Last 24 Hrs  T(C): 37.1 (03 Sep 2021 05:00), Max: 37.1 (03 Sep 2021 05:00)  T(F): 98.8 (03 Sep 2021 05:00), Max: 98.8 (03 Sep 2021 05:00)  HR: 111 (03 Sep 2021 05:00) (94 - 111)  BP: 159/69 (03 Sep 2021 05:00) (117/54 - 159/69)  BP(mean): --  RR: 20 (03 Sep 2021 05:00) (20 - 24)  SpO2: --    Neurological Exam:   Mental status: A&Ox1 very agiatted and not cooperative, keeps saying "get away from me"  Cranial nerves: gaze is midline, tracks, no obvious facial assymetry  Motor: spontaneous movements x 4 observed, unable to test for muscle strength due to agitation  Sensation: UTT   Coordination: UTT      Labs:  CBC Full  -  ( 02 Sep 2021 14:31 )  WBC Count : 11.08 K/uL  RBC Count : 3.08 M/uL  Hemoglobin : 8.9 g/dL  Hematocrit : 28.4 %  Platelet Count - Automated : 207 K/uL  Mean Cell Volume : 92.2 fL  Mean Cell Hemoglobin : 28.9 pg  Mean Cell Hemoglobin Concentration : 31.3 g/dL  Auto Neutrophil # : x  Auto Lymphocyte # : x  Auto Monocyte # : x  Auto Eosinophil # : x  Auto Basophil # : x  Auto Neutrophil % : x  Auto Lymphocyte % : x  Auto Monocyte % : x  Auto Eosinophil % : x  Auto Basophil % : x    09-02    143  |  106  |  19  ----------------------------<  167<H>  4.1   |  27  |  1.0    Ca    8.1<L>      02 Sep 2021 14:31    TPro  4.8<L>  /  Alb  2.6<L>  /  TBili  0.4  /  DBili  x   /  AST  33  /  ALT  41  /  AlkPhos  123<H>  09-02    LIVER FUNCTIONS - ( 02 Sep 2021 14:31 )  Alb: 2.6 g/dL / Pro: 4.8 g/dL / ALK PHOS: 123 U/L / ALT: 41 U/L / AST: 33 U/L / GGT: x           PT/INR - ( 02 Sep 2021 14:31 )   PT: 13.40 sec;   INR: 1.17 ratio         PTT - ( 02 Sep 2021 14:31 )  PTT:18.1 sec      Assessment:  This is a 71y Male w/ h/o     Plan:

## 2021-09-03 NOTE — BEHAVIORAL HEALTH ASSESSMENT NOTE - OTHER
Pt has not been receiving medication of Bipolar Disorder, but it is unclear when was pt's last known mood episode. restlessness, head-rolling Unable to assess Deferred Unable to assess, however pt appeared to endorse paranoia towards staff. impacted by delirium pt appeared to endorse paranoia towards staff.

## 2021-09-03 NOTE — BEHAVIORAL HEALTH ASSESSMENT NOTE - SUMMARY
Tariq Ceja is a 71 Black M , domiciled in a private residence, with past psychiatric history of bipolar disorder, as per chart review- past medical history of DM, osteomyelitis, amputation of left first toe and all right toes, GERD, HLD, HTN, and substance abuse disorder, who was brought to the ED for AMS, aphasia and dysarthria s/p a stroke (positive MRI 8/28/21). The pt was admitted for HHS and experienced 2 seizures the same day for which he received 2 doses of Ativan 2 mg and Keppra 150 mg IV q2h. Psychiatry was consulted for pt becoming agitated, paranoid and combative towards staff since yesterday (received Zyprexa 5 mg IM for agitation on 9/2, two doses of Haldol 2 mg today at 9:00 during a code grey).    Based on evaluation, the pt exhibits disorganized thought patterns, impaired reasoning, disturbance in attention, awareness, and orientation, affective lability and psychomotor agitation- evidenced by irritability, unprovoked verbal aggression and paranoia towards staff. His most likely diagnosis is hyperactive delirium due to underlying metabolic processes, in lieu of recent HHS, stroke and seizures. He would benefit from routine antipsychotic administration, such as Haldol, during this hospitalization to treat delirium and ameliorate episodes of agitation. Secondary diagnosis of bipolar disorder by history cannot be ruled out, but is less likely given the pt's stability off mood-stabilizing medications for at least this past year, as well as limited evidence that the pt is currently manic or psychotic.     Recommendations:  - Continue to treat underlying medical conditions.   - For delirium, haldol 2 mg PO at 6:00, 1 mg at 13:00, 2 mg before bedtime, with escalation to IM if pt refusing PO. If IM antipsychotic is administered, please perform follow-up ECG for QTc monitoring daily.  - For severe agitation not responding to behavioral intervention, may give haldol 2 mg po q8h prn with escalation to IM if patient refusing PO and remains an imminent danger to self or others. If IM antipsychotic is administered, please perform follow-up ECG for QTc monitoring daily. Please do not exceed total haldol dosage of 10 mg per day.   - Hold off on other psychotropic medications at this time.   - Nursing monitor 1:1 strongly recommended for pt being at potential fall risk, lack of PO intake and agitation risk.   - Psychiatry will follow-up tomorrow, 9/4/2021. Tariq Ceja is a 71 Black M , domiciled in a private residence, with past psychiatric history of bipolar disorder, as per chart review- past medical history of DM, osteomyelitis, amputation of left first toe and all right toes, GERD, HLD, HTN, and substance abuse disorder, who was brought to the ED for AMS, aphasia and dysarthria s/p a stroke (positive MRI 8/28/21). The pt was admitted for HHS and experienced 2 seizures the same day for which he received 2 doses of Ativan 2 mg and Keppra 150 mg IV q2h. Psychiatry was consulted for pt becoming agitated, paranoid and combative towards staff since yesterday (received Zyprexa 5 mg IM for agitation on 9/2, two doses of Haldol 2 mg today at 9:00 during a code grey).    Based on evaluation, the pt exhibits disorganized thought patterns, impaired reasoning, disturbance in attention, awareness, and orientation, affective lability and psychomotor agitation- evidenced by irritability, unprovoked verbal aggression and paranoia towards staff. His most likely diagnosis is hyperactive delirium due to underlying metabolic processes, in lieu of recent HHS, stroke and seizures. He would benefit from routine antipsychotic administration, such as Haldol, during this hospitalization to treat delirium and ameliorate episodes of agitation. Secondary diagnosis of bipolar disorder by history cannot be ruled out, but is less likely given the pt's stability off mood-stabilizing medications for at least this past year, as well as limited evidence that the patient is currently manic or psychotic.     Recommendations:  - Continue to treat underlying medical conditions and ongoing delirium work up as per primary team and neuro  - For delirium: consider  haldol 2 mg PO at 6:00, 1 mg at 13:00, 2 mg before bedtime, with escalation to IM if pt refusing PO. If IM antipsychotic is administered, please perform follow-up ECG for QTc monitoring daily. This is to be used only while this patient is in the hospital  - For severe agitation not responding to behavioral intervention, may give haldol 2 mg po/IM  q8h prn with escalation to IM if patient refusing PO and remains an imminent danger to self or others. If IM antipsychotic is administered, please perform follow-up ECG for QTc monitoring daily. Please do not exceed total haldol dosage of 10 mg per day.   - Other than haldol, hold off on other psychotropic medications at this time  - Nursing monitor 1:1 strongly recommended for pt being at potential fall risk, lack of PO intake and agitation risk.   - Psychiatry will follow-up tomorrow, 9/4/2021.

## 2021-09-04 LAB
GLUCOSE BLDC GLUCOMTR-MCNC: 107 MG/DL — HIGH (ref 70–99)
GLUCOSE BLDC GLUCOMTR-MCNC: 169 MG/DL — HIGH (ref 70–99)
GLUCOSE BLDC GLUCOMTR-MCNC: 173 MG/DL — HIGH (ref 70–99)
GLUCOSE BLDC GLUCOMTR-MCNC: 226 MG/DL — HIGH (ref 70–99)

## 2021-09-04 PROCEDURE — 99233 SBSQ HOSP IP/OBS HIGH 50: CPT

## 2021-09-04 PROCEDURE — 99232 SBSQ HOSP IP/OBS MODERATE 35: CPT

## 2021-09-04 PROCEDURE — 99223 1ST HOSP IP/OBS HIGH 75: CPT

## 2021-09-04 PROCEDURE — 93010 ELECTROCARDIOGRAM REPORT: CPT

## 2021-09-04 RX ORDER — CLOPIDOGREL BISULFATE 75 MG/1
75 TABLET, FILM COATED ORAL DAILY
Refills: 0 | Status: DISCONTINUED | OUTPATIENT
Start: 2021-09-04 | End: 2021-09-09

## 2021-09-04 RX ORDER — ATORVASTATIN CALCIUM 80 MG/1
40 TABLET, FILM COATED ORAL AT BEDTIME
Refills: 0 | Status: DISCONTINUED | OUTPATIENT
Start: 2021-09-04 | End: 2021-09-09

## 2021-09-04 RX ADMIN — SENNA PLUS 2 TABLET(S): 8.6 TABLET ORAL at 22:03

## 2021-09-04 RX ADMIN — Medication 3 MILLILITER(S): at 21:03

## 2021-09-04 RX ADMIN — PANTOPRAZOLE SODIUM 40 MILLIGRAM(S): 20 TABLET, DELAYED RELEASE ORAL at 11:46

## 2021-09-04 RX ADMIN — Medication 4 MILLIGRAM(S): at 22:03

## 2021-09-04 RX ADMIN — LACOSAMIDE 120 MILLIGRAM(S): 50 TABLET ORAL at 06:05

## 2021-09-04 RX ADMIN — Medication 250 MILLIGRAM(S): at 08:19

## 2021-09-04 RX ADMIN — Medication 3 MILLILITER(S): at 16:21

## 2021-09-04 RX ADMIN — LEVETIRACETAM 400 MILLIGRAM(S): 250 TABLET, FILM COATED ORAL at 18:02

## 2021-09-04 RX ADMIN — Medication 2: at 11:47

## 2021-09-04 RX ADMIN — Medication 4: at 16:16

## 2021-09-04 RX ADMIN — LEVETIRACETAM 400 MILLIGRAM(S): 250 TABLET, FILM COATED ORAL at 06:16

## 2021-09-04 RX ADMIN — CHLORHEXIDINE GLUCONATE 1 APPLICATION(S): 213 SOLUTION TOPICAL at 06:12

## 2021-09-04 RX ADMIN — Medication 650 MILLIGRAM(S): at 06:11

## 2021-09-04 RX ADMIN — Medication 2: at 07:56

## 2021-09-04 RX ADMIN — LACOSAMIDE 120 MILLIGRAM(S): 50 TABLET ORAL at 18:33

## 2021-09-04 RX ADMIN — Medication 81 MILLIGRAM(S): at 11:45

## 2021-09-04 RX ADMIN — LISINOPRIL 10 MILLIGRAM(S): 2.5 TABLET ORAL at 06:10

## 2021-09-04 RX ADMIN — Medication 50 MILLIGRAM(S): at 07:02

## 2021-09-04 RX ADMIN — ENOXAPARIN SODIUM 40 MILLIGRAM(S): 100 INJECTION SUBCUTANEOUS at 11:47

## 2021-09-04 RX ADMIN — Medication 50 MILLIGRAM(S): at 22:04

## 2021-09-04 RX ADMIN — OLANZAPINE 5 MILLIGRAM(S): 15 TABLET, FILM COATED ORAL at 22:03

## 2021-09-04 RX ADMIN — CLOPIDOGREL BISULFATE 75 MILLIGRAM(S): 75 TABLET, FILM COATED ORAL at 11:46

## 2021-09-04 RX ADMIN — Medication 50 MILLIGRAM(S): at 14:05

## 2021-09-04 RX ADMIN — ATORVASTATIN CALCIUM 40 MILLIGRAM(S): 80 TABLET, FILM COATED ORAL at 22:03

## 2021-09-04 RX ADMIN — INSULIN GLARGINE 20 UNIT(S): 100 INJECTION, SOLUTION SUBCUTANEOUS at 22:03

## 2021-09-04 NOTE — PROGRESS NOTE ADULT - ASSESSMENT
HOSPITAL COURSE:    < from: CT Head No Cont (21 @ 10:48) >  IMPRESSION:    No acute intracranial pathology.    Stable mild chronic microvascular ischemic changes.    New moderate left/mild right mastoid effusion and partial effusion in the left middle ear cavity, nonspecific but can be seen with otomastoiditis.     ASSESSMENT & PLAN:    70y M w/ hx of DMII, admitted due to seizure witnessed by wife. Pt admitted to ICU with DKA/HHS. Had seizures consistent with tonic-clonic seizure x2 in ED on 824. Pt was loaded with 3g Keppra and transferred to MICU where he was started on an insulin drip. He was subsequently intubated for airway protection and started on Versed gtt. Course was complicated by respiratory failure, pneumonia, encephalopathy, metabolic derangements. Tox screen was (_+) for benzos. Pt was extubated on . Pt was placed on VEEG which was negative for seizure. However, Vimpat was added to regime due to changes on EEG. Initial CTH/CTA was unremarkable. MRI on  revealed puncate acute infarcts involving L occipital cortex. CT Head on 9/3 revealed effusions consistent with mastoiditis.     ASSESSMENT & PLAN:    # Altered Mental Status vs. Delirium - likely new onset   - per wife, pt is fully functional at baseline   - pt very combative, kicking roommate and staff, not allowing people to come near him (9/3)   - as per psych, pt has delirium  - per psych:  Haldol Stand[place]mg @ 6am, 1mg @ 1pm, 2mg in the evening. Additional PRN: 2mg q8h;   - Daily ECG      #Status epilepticus- resolved   - veeg dcd   - Continue Keppra 1gram q8hrs  - Continue Vimpat 100mg q12hrs    #Small acute L occipital infarct  -C/w aspirin 81 mg daily   -C/w Statin  - added plavix and increased statin to 40mg on  as per neuro  - Consider ILR when stable   - f/u Lipid panel     # Non-specific Mastoid Effusion   - CT Head (9/3): Moderate left/mild right mastoid effusion; partial effusion in left middle ear cavity; possibly consistent with otomastoiditis    # HHS/DKA- resolved   # DM II - controlled    - Sliding scale q AC and HS  - lantus 20 units at night   - A1C 5.7     #Acute encephalopathy  - Continue neuro checks q 8 hours     # Pneumonia - likely from intubation; resolving   -C/w Vancomycin (last trough ; pt refusing additional troughs);  is day 5  - C/w aztreonam;  is day 4  - f/u final read of blood cx , and sputum sensitivities    - sputum  (+) gram neg rods and gram positive cocci   - will plan for 7 day antibiotic course   - duoneb and chest pt q 4     #Hypertension  - Lisinopril 10 mg PO daily started      #Urinary Retention  -Failed TOV x3; has lubin   -c/w cardura 4 mg daily   -attempt TOV when pt is no long combative/safer
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
## 2021-09-04 NOTE — PROGRESS NOTE ADULT - ASSESSMENT
72 y/o M who was admitted for status epilepticus and HHA. s/p extubation and downgraded to floor. Patient has newly developed confusion and agitation, repeat CTH: no acute infract but showed B/L mastoiditis.  #Status epileticus s/p extubation, new onset agitation and delirium:  - c/w Keppra 1gm BID  - c/w Vimpat 100 mg BID  - repeat CTH reviewed.    #Acute left occipital infarct:  - has been evaluated by stroke attending  - Added Palvix in addition to ASA  - recs as per srtoek attending 72 y/o M who was admitted for status epilepticus and HHA. s/p extubation and downgraded to floor. Patient has newly developed confusion and agitation, repeat CTH: no acute infract but showed B/L mastoiditis.  #Status epileticus s/p extubation, new onset agitation and delirium( now resolved):  - c/w Keppra 1gm BID  - c/w Vimpat 100 mg BID  - repeat CTH reviewed.  - Was confused and agitated yesterday, but today he seems quiet, he remembers what happened yesterday and is embarrassed about it.   - Management of mastoiditis.  #Acute left occipital infarct:  - has been evaluated by stroke attending  - Added Palvix in addition to ASA  - recs as per stroke  attending

## 2021-09-04 NOTE — PROGRESS NOTE ADULT - SUBJECTIVE AND OBJECTIVE BOX
JOSÉ MANUEL PORTER 71y Male  MRN#: [id number]   Hospital Day: 11d    SUBJECTIVE  Patient is a 71y old Male who presents with a chief complaint of AMS (04 Sep 2021 09:11)  Currently admitted to medicine with the primary diagnosis of Hyperglycemia      INTERVAL HPI AND OVERNIGHT EVENTS:  Patient was examined and seen at bedside. This morning he is sleeping in bed. as per night team,     REVIEW OF SYMPTOMS:  CONSTITUTIONAL: No weakness, fevers or chills; No headaches  EYES: No visual changes, eye pain, or discharge  ENT: No vertigo; No ear pain or change in hearing; No sore throat or difficulty swallowing  NECK: No pain or stiffness  RESPIRATORY: No cough, wheezing, or hemoptysis; No shortness of breath  CARDIOVASCULAR: No chest pain or palpitations  GASTROINTESTINAL: No abdominal or epigastric pain; No nausea, vomiting, or hematemesis; No diarrhea or constipation; No melena or hematochezia  GENITOURINARY: No dysuria, frequency or hematuria  MUSCULOSKELETAL: No joint pain, no muscle pain, no weakness  NEUROLOGICAL: No numbness or weakness  SKIN: No itching or rashes    OBJECTIVE  PAST MEDICAL & SURGICAL HISTORY  DM (diabetes mellitus)  12/27/18 hgb aic  11.2    HTN (hypertension)    Dyslipidemia    Diabetic foot ulcer    Depression    GERD (gastroesophageal reflux disease)    Diabetes    Toe amputation status, right  (2008)    History of amputation of toe  LT -partial 1st toe      ALLERGIES:  No Known Allergies    MEDICATIONS:  STANDING MEDICATIONS  albuterol/ipratropium for Nebulization 3 milliLiter(s) Nebulizer every 6 hours  aspirin  chewable 81 milliGRAM(s) Oral daily  atorvastatin 40 milliGRAM(s) Oral at bedtime  aztreonam  IVPB 1000 milliGRAM(s) IV Intermittent every 8 hours  chlorhexidine 4% Liquid 1 Application(s) Topical <User Schedule>  clopidogrel Tablet 75 milliGRAM(s) Oral daily  doxazosin 4 milliGRAM(s) Oral at bedtime  enoxaparin Injectable 40 milliGRAM(s) SubCutaneous daily  haloperidol    Injectable 2 milliGRAM(s) IntraMuscular two times a day  haloperidol    Injectable 1 milliGRAM(s) IntraMuscular daily  insulin glargine Injectable (LANTUS) 20 Unit(s) SubCutaneous at bedtime  insulin lispro (ADMELOG) corrective regimen sliding scale   SubCutaneous Before meals and at bedtime  lacosamide IVPB 100 milliGRAM(s) IV Intermittent every 12 hours  levETIRAcetam  IVPB 1000 milliGRAM(s) IV Intermittent every 12 hours  lisinopril 10 milliGRAM(s) Oral daily  OLANZapine 5 milliGRAM(s) Oral at bedtime  pantoprazole   Suspension 40 milliGRAM(s) Oral daily  senna 2 Tablet(s) Oral at bedtime  vancomycin  IVPB      vancomycin  IVPB 1000 milliGRAM(s) IV Intermittent two times a day    PRN MEDICATIONS  acetaminophen   Tablet .. 650 milliGRAM(s) Oral every 6 hours PRN  haloperidol    Injectable 2 milliGRAM(s) IntraMuscular every 8 hours PRN  labetalol Injectable 10 milliGRAM(s) IV Push every 6 hours PRN      PHYSICAL EXAM:  CONSTITUTIONAL: No acute distress, well-developed, well-groomed, AAOx3  HEAD: Atraumatic, normocephalic  EYES: EOM intact, PERRLA, conjunctiva and sclera clear  ENT: Supple, no masses, no thyromegaly, no bruits, no JVD; moist mucous membranes  PULMONARY: Clear to auscultation bilaterally; no wheezes, rales, or rhonchi  CARDIOVASCULAR: Regular rate and rhythm; no murmurs, rubs, or gallops  GASTROINTESTINAL: Soft, non-tender, non-distended; bowel sounds present  MUSCULOSKELETAL: 2+ peripheral pulses; no clubbing, no cyanosis, no edema  NEUROLOGY: non-focal  SKIN: No rashes or lesions; warm and dry    VITAL SIGNS: Last 24 Hours  T(C): 35.9 (04 Sep 2021 08:00), Max: 36.8 (04 Sep 2021 05:44)  T(F): 96.7 (04 Sep 2021 08:00), Max: 98.2 (04 Sep 2021 05:44)  HR: 88 (04 Sep 2021 08:00) (76 - 97)  BP: 133/60 (04 Sep 2021 08:00) (123/63 - 180/94)  BP(mean): 95 (03 Sep 2021 21:17) (95 - 95)  RR: 17 (04 Sep 2021 08:00) (16 - 20)  SpO2: 98% (04 Sep 2021 08:00) (98% - 98%)    LABS:                        8.9    11.08 )-----------( 207      ( 02 Sep 2021 14:31 )             28.4     09-02    143  |  106  |  19  ----------------------------<  167<H>  4.1   |  27  |  1.0    Ca    8.1<L>      02 Sep 2021 14:31    TPro  4.8<L>  /  Alb  2.6<L>  /  TBili  0.4  /  DBili  x   /  AST  33  /  ALT  41  /  AlkPhos  123<H>  09-02    PT/INR - ( 02 Sep 2021 14:31 )   PT: 13.40 sec;   INR: 1.17 ratio         PTT - ( 02 Sep 2021 14:31 )  PTT:18.1 sec              RADIOLOGY:      HOSPITAL COURSE:    ASSESSMENT & PLAN:  Patient is a     .misc  JOSÉ MANUEL PORTER 71y Male  MRN#: [id number]   Hospital Day: 11d    SUBJECTIVE  Patient is a 71y old Male who presents with a chief complaint of AMS (04 Sep 2021 09:11)  Currently admitted to medicine with the primary diagnosis of Hyperglycemia      INTERVAL HPI AND OVERNIGHT EVENTS:  Patient was examined and seen at bedside. This morning he is sleeping in bed. as per night team, pt refused haldol on 9/4 AM. No other acute events    OBJECTIVE  PAST MEDICAL & SURGICAL HISTORY  DM (diabetes mellitus)  12/27/18 hgb aic  11.2    HTN (hypertension)    Dyslipidemia    Diabetic foot ulcer    Depression    GERD (gastroesophageal reflux disease)    Diabetes    Toe amputation status, right  (2008)    History of amputation of toe  LT -partial 1st toe      ALLERGIES:  No Known Allergies    MEDICATIONS:  STANDING MEDICATIONS  albuterol/ipratropium for Nebulization 3 milliLiter(s) Nebulizer every 6 hours  aspirin  chewable 81 milliGRAM(s) Oral daily  atorvastatin 40 milliGRAM(s) Oral at bedtime  aztreonam  IVPB 1000 milliGRAM(s) IV Intermittent every 8 hours  chlorhexidine 4% Liquid 1 Application(s) Topical <User Schedule>  clopidogrel Tablet 75 milliGRAM(s) Oral daily  doxazosin 4 milliGRAM(s) Oral at bedtime  enoxaparin Injectable 40 milliGRAM(s) SubCutaneous daily  haloperidol    Injectable 2 milliGRAM(s) IntraMuscular two times a day  haloperidol    Injectable 1 milliGRAM(s) IntraMuscular daily  insulin glargine Injectable (LANTUS) 20 Unit(s) SubCutaneous at bedtime  insulin lispro (ADMELOG) corrective regimen sliding scale   SubCutaneous Before meals and at bedtime  lacosamide IVPB 100 milliGRAM(s) IV Intermittent every 12 hours  levETIRAcetam  IVPB 1000 milliGRAM(s) IV Intermittent every 12 hours  lisinopril 10 milliGRAM(s) Oral daily  OLANZapine 5 milliGRAM(s) Oral at bedtime  pantoprazole   Suspension 40 milliGRAM(s) Oral daily  senna 2 Tablet(s) Oral at bedtime  vancomycin  IVPB      vancomycin  IVPB 1000 milliGRAM(s) IV Intermittent two times a day    PRN MEDICATIONS  acetaminophen   Tablet .. 650 milliGRAM(s) Oral every 6 hours PRN  haloperidol    Injectable 2 milliGRAM(s) IntraMuscular every 8 hours PRN  labetalol Injectable 10 milliGRAM(s) IV Push every 6 hours PRN      PHYSICAL EXAM:  CONSTITUTIONAL: sleeping; unarousable even with abdominal palpation and calling name   HEAD: Atraumatic, normocephalic  PULMONARY: Clear to auscultation bilaterally; no wheezes, rales, or rhonchi  CARDIOVASCULAR: Regular rate and rhythm; no murmurs, rubs, or gallops  GASTROINTESTINAL: Soft, non-tender, non-distended; bowel sounds present  EXTREMITIES: RLE s/p transmetatarsal amputation   SKIN: No rashes or lesions; warm and dry    VITAL SIGNS: Last 24 Hours  T(C): 35.9 (04 Sep 2021 08:00), Max: 36.8 (04 Sep 2021 05:44)  T(F): 96.7 (04 Sep 2021 08:00), Max: 98.2 (04 Sep 2021 05:44)  HR: 88 (04 Sep 2021 08:00) (76 - 97)  BP: 133/60 (04 Sep 2021 08:00) (123/63 - 180/94)  BP(mean): 95 (03 Sep 2021 21:17) (95 - 95)  RR: 17 (04 Sep 2021 08:00) (16 - 20)  SpO2: 98% (04 Sep 2021 08:00) (98% - 98%)    LABS:                        8.9    11.08 )-----------( 207      ( 02 Sep 2021 14:31 )             28.4     09-02    143  |  106  |  19  ----------------------------<  167<H>  4.1   |  27  |  1.0    Ca    8.1<L>      02 Sep 2021 14:31    TPro  4.8<L>  /  Alb  2.6<L>  /  TBili  0.4  /  DBili  x   /  AST  33  /  ALT  41  /  AlkPhos  123<H>  09-02    PT/INR - ( 02 Sep 2021 14:31 )   PT: 13.40 sec;   INR: 1.17 ratio         PTT - ( 02 Sep 2021 14:31 )  PTT:18.1 sec    RADIOLOGY:    < from: CT Head No Cont (09.03.21 @ 10:48) >  IMPRESSION:    No acute intracranial pathology.    Stable mild chronic microvascular ischemic changes.    New moderate left/mild right mastoid effusion and partial effusion in the left middle ear cavity, nonspecific but can be seen with otomastoiditis.    < end of copied text >
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
## 2021-09-04 NOTE — PROGRESS NOTE ADULT - ATTENDING COMMENTS
Patient examined on 9/4/21 and was conversant.  Has field defect from stroke and now on dual antiplatelet therapy.  No further reported seizures. On keppra 1 gram bid and lacosamide 100 mg bid.  ? hx of bipolar.  Was agitated day before  but behaviors improved on 9/5.  Okay to continue current anticonvulsant regimen, however if agitation and mood swings recur on this  regimen would give consideration to transitioning from keppra to depakote.

## 2021-09-04 NOTE — CONSULT NOTE ADULT - SUBJECTIVE AND OBJECTIVE BOX
Patient is a 71y old  Male who presents with a chief complaint of AMS (04 Sep 2021 10:17)        HPI:  70 y/o M with pmh of dm, osteomyelitis, gerd, depression, hld, htn presenting to the ED for above cc.   the patient was doing last night until he was unable to talk and walk independently. As per his ex wife the right sided then started shaking. She called 911.   On presentation the patient had status epilepticus and he received 4 mg of lorazepam.   CT brain done and an acute stroke could not be excluded because of technical difficulties.  His blood glucose was found to be more than 600.   he is admitted for [facility]    the ex wife said that he has bipolar and he was on a medication, although this is not found in the chart. She also does not recall his meds  sp intubation for airway protection, on propofol, INSULI, 1/2 ns 150 cc/h (24 Aug 2021 04:34)      Electrophysiology:  71y Male    REVIEW OF SYSTEMS    [ ] A ten-point review of systems was otherwise negative except as noted.  [ ] Due to altered mental status/intubation, subjective information were not able to be obtained from the patient. History was obtained, to the extent possible, from review of the chart and collateral sources of information.      PAST MEDICAL & SURGICAL HISTORY:  DM (diabetes mellitus)  18 hgb aic  11.2    HTN (hypertension)    Dyslipidemia    Diabetic foot ulcer    Depression    GERD (gastroesophageal reflux disease)    Diabetes    Toe amputation status, right  ()    History of amputation of toe  LT -partial 1st toe        Home Medications:  amoxicillin-clavulanate 875 mg-125 mg oral tablet: 1 tab(s) orally 2 times a day (12 Mar 2021 10:42)  benazepril 40 mg oral tablet: 1 tab(s) orally once a day (08 Mar 2021 20:15)  Crestor 10 mg oral tablet: 1 tab(s) orally once a day (at bedtime) (08 Mar 2021 20:15)  Janumet 50 mg-1000 mg oral tablet: 1 tab(s) orally 2 times a day (08 Mar 2021 20:15)  Lantus 100 units/mL subcutaneous solution: 30 unit(s) subcutaneous once a day in the morning (08 Mar 2021 20:15)  omeprazole 20 mg oral delayed release capsule: 1 cap(s) orally once a day (08 Mar 2021 20:15)  Trulicity Pen 0.75 mg/0.5 mL subcutaneous solution:  (08 Mar 2021 20:15)      Allergies:  No Known Allergies      FAMILY HISTORY:  Family history of lung cancer (Mother)    Family history of ischemic heart disease (IHD) (Father)        SOCIAL HISTORY:    CIGARETTES:  ALCOHOL:        PREVIOUS DIAGNOSTIC TESTING:      ECHO  FINDINGS:    STRESS  FINDINGS:    CATHETERIZATION  FINDINGS:    ELECTROPHYSIOLOGY STUDY  FINDINGS:    CAROTID ULTRASOUND:  FINDINGS    VENOUS DUPLEX SCAN:  FINDINGS:    CHEST CT PULMONARY ANGIO with IV Contrast:  FINDINGS:      MEDICATIONS  (STANDING):  albuterol/ipratropium for Nebulization 3 milliLiter(s) Nebulizer every 6 hours  aspirin  chewable 81 milliGRAM(s) Oral daily  atorvastatin 40 milliGRAM(s) Oral at bedtime  aztreonam  IVPB 1000 milliGRAM(s) IV Intermittent every 8 hours  chlorhexidine 4% Liquid 1 Application(s) Topical <User Schedule>  clopidogrel Tablet 75 milliGRAM(s) Oral daily  doxazosin 4 milliGRAM(s) Oral at bedtime  enoxaparin Injectable 40 milliGRAM(s) SubCutaneous daily  haloperidol    Injectable 2 milliGRAM(s) IntraMuscular two times a day  haloperidol    Injectable 1 milliGRAM(s) IntraMuscular daily  insulin glargine Injectable (LANTUS) 20 Unit(s) SubCutaneous at bedtime  insulin lispro (ADMELOG) corrective regimen sliding scale   SubCutaneous Before meals and at bedtime  lacosamide IVPB 100 milliGRAM(s) IV Intermittent every 12 hours  levETIRAcetam  IVPB 1000 milliGRAM(s) IV Intermittent every 12 hours  lisinopril 10 milliGRAM(s) Oral daily  OLANZapine 5 milliGRAM(s) Oral at bedtime  pantoprazole   Suspension 40 milliGRAM(s) Oral daily  senna 2 Tablet(s) Oral at bedtime  vancomycin  IVPB 1000 milliGRAM(s) IV Intermittent two times a day  vancomycin  IVPB        MEDICATIONS  (PRN):  acetaminophen   Tablet .. 650 milliGRAM(s) Oral every 6 hours PRN Temp greater or equal to 38C (100.4F), Mild Pain (1 - 3), Moderate Pain (4 - 6), Severe Pain (7 - 10)  haloperidol    Injectable 2 milliGRAM(s) IntraMuscular every 8 hours PRN agitation/combative/self destructive  labetalol Injectable 10 milliGRAM(s) IV Push every 6 hours PRN Systolic blood pressure >      Vital Signs Last 24 Hrs  T(C): 35.9 (04 Sep 2021 08:00), Max: 36.8 (04 Sep 2021 05:44)  T(F): 96.7 (04 Sep 2021 08:00), Max: 98.2 (04 Sep 2021 05:44)  HR: 88 (04 Sep 2021 08:00) (76 - 97)  BP: 133/60 (04 Sep 2021 08:00) (123/63 - 180/94)  BP(mean): 95 (03 Sep 2021 21:17) (95 - 95)  RR: 17 (04 Sep 2021 08:00) (16 - 20)  SpO2: 98% (04 Sep 2021 08:00) (98% - 98%)    PHYSICAL EXAM:    GENERAL: In no apparent distress, well nourished, and hydrated.  HEAD:  Atraumatic, Normocephalic  EYES: EOMI, PERRLA, conjunctiva and sclera clear  NECK: Supple and normal thyroid.  No JVD or carotid bruit.  Carotid pulse is 2+ bilaterally.  HEART: Regular rate and rhythm; No murmurs, rubs, or gallops.  PULMONARY: Clear to auscultation and perfusion.  No rales, wheezing, or rhonchi bilaterally.  ABDOMEN: Soft, Nontender, Nondistended; Bowel sounds present  EXTREMITIES:  2+ Peripheral Pulses, No clubbing, cyanosis, or edema  NEUROLOGICAL: Grossly nonfocal    I&O's Detail    03 Sep 2021 07:01  -  04 Sep 2021 07:00  --------------------------------------------------------  IN:  Total IN: 0 mL    OUT:    Indwelling Catheter - Urethral (mL): 900 mL  Total OUT: 900 mL    Total NET: -900 mL      04 Sep 2021 07:01  -  04 Sep 2021 13:13  --------------------------------------------------------  IN:    Oral Fluid: 300 mL  Total IN: 300 mL    OUT:    Indwelling Catheter - Urethral (mL): 600 mL  Total OUT: 600 mL    Total NET: -300 mL        Daily     Daily     INTERPRETATION OF TELEMETRY:    EC Lead ECG:   Ventricular Rate 85 BPM    Atrial Rate 85 BPM    P-R Interval 142 ms    QRS Duration 64 ms    Q-T Interval 368 ms    QTC Calculation(Bazett) 437 ms    P Axis 64 degrees    R Axis 23 degrees    T Axis -15 degrees    Diagnosis Line Normal sinus rhythm  Possible Inferior infarct , age undetermined  Nonspecific T wave abnormality  Abnormal ECG    Confirmed by Yolanda Salas MD ([id number]) on 2021 9:40:43 AM (21 @ 08:04)        LABS:                        8.9    11.08 )-----------( 207      ( 02 Sep 2021 14:31 )             28.4         143  |  106  |  19  ----------------------------<  167<H>  4.1   |  27  |  1.0    Ca    8.1<L>      02 Sep 2021 14:31    TPro  4.8<L>  /  Alb  2.6<L>  /  TBili  0.4  /  DBili  x   /  AST  33  /  ALT  41  /  AlkPhos  123<H>          PT/INR - ( 02 Sep 2021 14:31 )   PT: 13.40 sec;   INR: 1.17 ratio         PTT - ( 02 Sep 2021 14:31 )  PTT:18.1 sec    BNP      COVID-19 PCR: NotDetec (21 @ 14:45)        RADIOLOGY & ADDITIONAL STUDIES:       Patient is a 71y old  Male who presents with a chief complaint of AMS (04 Sep 2021 10:17)        HPI:  70 y/o M with pmh of dm, osteomyelitis, gerd, depression, hld, htn presenting to the ED for above cc.   the patient was doing last night until he was unable to talk and walk independently. As per his ex wife the right sided then started shaking. She called 911.   On presentation the patient had status epilepticus and he received 4 mg of lorazepam.   CT brain done and an acute stroke could not be excluded because of technical difficulties.  His blood glucose was found to be more than 600.   he is admitted for [facility]    the ex wife said that he has bipolar and he was on a medication, although this is not found in the chart. She also does not recall his meds  sp intubation for airway protection, on propofol, INSULI, 1/2 ns 150 cc/h (24 Aug 2021 04:34)      Electrophysiology:  71y Male with h/o DM, HTN, HLD, originally admitted  with stoke like symptoms, found to be in status epilepticus, was intubated on admission,  extubated . Work up revealed punctate acute infarct of left occipital cortex. No events on tele during admission. Was downgraded to regular neuro floor. Yesterday had episode of confusion and lethargy, Repeat study was negative for acute findings  EP consulted for long term cardiac monitoring      REVIEW OF SYSTEMS    [ ] A ten-point review of systems was otherwise negative except as noted.  [ ] Due to altered mental status/intubation, subjective information were not able to be obtained from the patient. History was obtained, to the extent possible, from review of the chart and collateral sources of information.      PAST MEDICAL & SURGICAL HISTORY:  DM (diabetes mellitus)  18 hgb aic  11.2    HTN (hypertension)    Dyslipidemia    Diabetic foot ulcer    Depression    GERD (gastroesophageal reflux disease)    Diabetes    Toe amputation status, right  ()    History of amputation of toe  LT -partial 1st toe        Home Medications:  amoxicillin-clavulanate 875 mg-125 mg oral tablet: 1 tab(s) orally 2 times a day (12 Mar 2021 10:42)  benazepril 40 mg oral tablet: 1 tab(s) orally once a day (08 Mar 2021 20:15)  Crestor 10 mg oral tablet: 1 tab(s) orally once a day (at bedtime) (08 Mar 2021 20:15)  Janumet 50 mg-1000 mg oral tablet: 1 tab(s) orally 2 times a day (08 Mar 2021 20:15)  Lantus 100 units/mL subcutaneous solution: 30 unit(s) subcutaneous once a day in the morning (08 Mar 2021 20:15)  omeprazole 20 mg oral delayed release capsule: 1 cap(s) orally once a day (08 Mar 2021 20:15)  Trulicity Pen 0.75 mg/0.5 mL subcutaneous solution:  (08 Mar 2021 20:15)      Allergies:  No Known Allergies      FAMILY HISTORY:  Family history of lung cancer (Mother)    Family history of ischemic heart disease (IHD) (Father)        SOCIAL HISTORY: denies tobacco / ETOH / illicit drug use     CIGARETTES:  ALCOHOL:        PREVIOUS DIAGNOSTIC TESTING:      ECHO  FINDINGS:  < from: TTE Echo Complete w/o Contrast w/ Doppler (21 @ 13:10) >  Summary:   1. Technically suboptimal study.   2. Normal global left ventricular systolic function.   3. Left ventricular ejection fraction, by visual estimation, is 55 to 60%.   4. Normal right ventricular size and function.   5. LA volume Index is 19.0 ml/m² ml/m2.   6. Normal leftventricular internal cavity size.   7. The left ventricular diastolic function could not be assessed in this study.    PHYSICIAN INTERPRETATION:  Left Ventricle: The left ventricular internal cavity size is normal. Left ventricular wall thickness is normal. There is no left ventricular hypertrophy. Global LV systolic function was normal. Left ventricular ejection fraction, by visual estimation, is 55 to 60%. The left ventricular diastolic function could not be assessed in this study.    < end of copied text >    < from: TTE Echo Complete w/o Contrast w/ Doppler (03.10.21 @ 10:42) >  Summary:   1. LV Ejection Fraction by Monahan's Method with a biplane EF of 55 %.   2. Spectral Doppler showsimpaired relaxation pattern of left ventricular myocardial filling (Grade I diastolic dysfunction).   3. Normal left atrial size.   4. Normal right atrial size.   5. Trace mitral valve regurgitation.   6. Intra-atrial septal aneurysm.    PHYSICIAN INTERPRETATION:  Left Ventricle: The left ventricular internal cavity size is normal. Left ventricular wall thickness is normal. Spectral Doppler shows impaired relaxation pattern of left ventricular myocardial filling (Grade I diastolic dysfunction).  Right Ventricle: Normal right ventricular size and function.  Left Atrium: Normal left atrial size. Intra-atrial septal aneurysm.  Right Atrium: Normal right atrial size.  Pericardium: There is no evidence of pericardial effusion.  Mitral Valve: No evidence of mitral stenosis. Trace mitral valve regurgitation is seen.  Tricuspid Valve: Trivial tricuspid regurgitation is visualized.  Aortic Valve: No evidence of aortic stenosis. Peak transaortic gradient equals 5.7 mmHg, mean transaortic gradient equals 3.2 mmHg, the calculated aortic valve area equals 2.71 cm² by the continuity equation consistent with normally opening aortic valve. No evidence of aortic valve regurgitation is seen.  Pulmonic Valve: Trace pulmonic valve regurgitation.  Aorta: The aortic root is normal in size and structure.    < end of copied text >      VENOUS DUPLEX SCAN:  FINDINGS:    < from: VA Duplex Lower Ext Vein Scan, Bilat (21 @ 14:46) >    No evidence of deep venous thrombosis or superficial thrombophlebitis in the bilateral lower extremities.    < end of copied text >          MEDICATIONS  (STANDING):  albuterol/ipratropium for Nebulization 3 milliLiter(s) Nebulizer every 6 hours  aspirin  chewable 81 milliGRAM(s) Oral daily  atorvastatin 40 milliGRAM(s) Oral at bedtime  aztreonam  IVPB 1000 milliGRAM(s) IV Intermittent every 8 hours  chlorhexidine 4% Liquid 1 Application(s) Topical <User Schedule>  clopidogrel Tablet 75 milliGRAM(s) Oral daily  doxazosin 4 milliGRAM(s) Oral at bedtime  enoxaparin Injectable 40 milliGRAM(s) SubCutaneous daily  haloperidol    Injectable 2 milliGRAM(s) IntraMuscular two times a day  haloperidol    Injectable 1 milliGRAM(s) IntraMuscular daily  insulin glargine Injectable (LANTUS) 20 Unit(s) SubCutaneous at bedtime  insulin lispro (ADMELOG) corrective regimen sliding scale   SubCutaneous Before meals and at bedtime  lacosamide IVPB 100 milliGRAM(s) IV Intermittent every 12 hours  levETIRAcetam  IVPB 1000 milliGRAM(s) IV Intermittent every 12 hours  lisinopril 10 milliGRAM(s) Oral daily  OLANZapine 5 milliGRAM(s) Oral at bedtime  pantoprazole   Suspension 40 milliGRAM(s) Oral daily  senna 2 Tablet(s) Oral at bedtime  vancomycin  IVPB 1000 milliGRAM(s) IV Intermittent two times a day  vancomycin  IVPB        MEDICATIONS  (PRN):  acetaminophen   Tablet .. 650 milliGRAM(s) Oral every 6 hours PRN Temp greater or equal to 38C (100.4F), Mild Pain (1 - 3), Moderate Pain (4 - 6), Severe Pain (7 - 10)  haloperidol    Injectable 2 milliGRAM(s) IntraMuscular every 8 hours PRN agitation/combative/self destructive  labetalol Injectable 10 milliGRAM(s) IV Push every 6 hours PRN Systolic blood pressure >      Vital Signs Last 24 Hrs  T(C): 35.9 (04 Sep 2021 08:00), Max: 36.8 (04 Sep 2021 05:44)  T(F): 96.7 (04 Sep 2021 08:00), Max: 98.2 (04 Sep 2021 05:44)  HR: 88 (04 Sep 2021 08:00) (76 - 97)  BP: 133/60 (04 Sep 2021 08:00) (123/63 - 180/94)  BP(mean): 95 (03 Sep 2021 21:17) (95 - 95)  RR: 17 (04 Sep 2021 08:00) (16 - 20)  SpO2: 98% (04 Sep 2021 08:00) (98% - 98%)    PHYSICAL EXAM:    GENERAL: In no apparent distress, well nourished, and hydrated.  HEAD:  Atraumatic, Normocephalic  EYES: EOMI, PERRLA, conjunctiva and sclera clear  NECK: Supple and normal thyroid.  No JVD or carotid bruit.  Carotid pulse is 2+ bilaterally.  HEART: Regular rate and rhythm; No murmurs, rubs, or gallops.  PULMONARY: Clear to auscultation and perfusion.  No rales, wheezing, or rhonchi bilaterally.  ABDOMEN: Soft, Nontender, Nondistended; Bowel sounds present  EXTREMITIES:  2+ Peripheral Pulses, No clubbing, cyanosis, or edema  NEUROLOGICAL: alert, oriented to person and place, somewhat to time     I&O's Detail    03 Sep 2021 07:01  -  04 Sep 2021 07:00  --------------------------------------------------------  IN:  Total IN: 0 mL    OUT:    Indwelling Catheter - Urethral (mL): 900 mL  Total OUT: 900 mL    Total NET: -900 mL      04 Sep 2021 07:01  -  04 Sep 2021 13:13  --------------------------------------------------------  IN:    Oral Fluid: 300 mL  Total IN: 300 mL    OUT:    Indwelling Catheter - Urethral (mL): 600 mL  Total OUT: 600 mL    Total NET: -300 mL        Daily     Daily     INTERPRETATION OF TELEMETRY:    EC Lead ECG:   Ventricular Rate 85 BPM    Atrial Rate 85 BPM    P-R Interval 142 ms    QRS Duration 64 ms    Q-T Interval 368 ms    QTC Calculation(Bazett) 437 ms    P Axis 64 degrees    R Axis 23 degrees    T Axis -15 degrees    Diagnosis Line Normal sinus rhythm  Possible Inferior infarct , age undetermined  Nonspecific T wave abnormality  Abnormal ECG    Confirmed by Yolanda Salas MD ([id number]) on 2021 9:40:43 AM (21 @ 08:04)    < from: 12 Lead ECG (21 @ 11:46) >  Ventricular Rate 139 BPM    Atrial Rate 298 BPM    QRS Duration 88 ms    Q-T Interval 383 ms    QTC Calculation(Bazett) 583 ms    R Axis 64 degrees    T Axis 50 degrees    Diagnosis Line Sinus rhythm with A-V dissociation  Nonspecific ST-T changes  Abnormal ECG    < end of copied text >      LABS:                        8.9    11.08 )-----------( 207      ( 02 Sep 2021 14:31 )             28.4     09-02    143  |  106  |  19  ----------------------------<  167<H>  4.1   |  27  |  1.0    Ca    8.1<L>      02 Sep 2021 14:31    TPro  4.8<L>  /  Alb  2.6<L>  /  TBili  0.4  /  DBili  x   /  AST  33  /  ALT  41  /  AlkPhos  123<H>  09-02        PT/INR - ( 02 Sep 2021 14:31 )   PT: 13.40 sec;   INR: 1.17 ratio         PTT - ( 02 Sep 2021 14:31 )  PTT:18.1 sec    BNP      COVID-19 PCR: NotDetec (21 @ 14:45)        RADIOLOGY & ADDITIONAL STUDIES:    < from: CT Brain Stroke Protocol (21 @ 00:59) >  FINDINGS:    Parenchymal volume is appropriate for patient age. No hydrocephalus.    No large territorial infarct, intracranial hemorrhage, extra-axial fluid collection, or midline shift.    Status post left cataract surgery. No acute osseous abnormality.    Imaged paranasal sinuses and mastoid air cavities are well aerated.    Stable left basal ganglion calcifications.    IMPRESSION:    No CT evidence for acute intracranial pathology.    < end of copied text >    < from: CT Perfusion w/ Maps w/ IV Cont (21 @ 01:49) >  IMPRESSION:    PERFUSION:  Nondiagnostic perfusion exam secondary to a software error as detailed above.    CTA HEAD:  No evidence of flow-limiting stenosis, occlusion or aneurysm.    CTA NECK:  No evidence of flow-limiting stenosis, occlusion or aneurysm..    < end of copied text >    < from: MR Head No Cont (21 @ 16:15) >  IMPRESSION:  Punctate acute infarct involving the left occipital cortex. No evidence of hemorrhage.    Mild chronic microvascular changes.    < end of copied text >    < from: CT Head No Cont (21 @ 03:09) >  IMPRESSION:    In comparison with the prior CT scan of the brain dated 2021:    The current examination is limited due to streak artifact from the EEG leads.    Otherwise stable examination.    < end of copied text >    < from: CT Head No Cont (21 @ 10:48) >  IMPRESSION:    No acute intracranial pathology.    Stable mild chronic microvascular ischemic changes.    New moderate left/mild right mastoid effusion and partial effusion in the left middle ear cavity, nonspecific but can be seen with otomastoiditis.    < end of copied text >

## 2021-09-04 NOTE — CONSULT NOTE ADULT - ASSESSMENT
71y Male with h/o DM, HTN, HLD, admitted with CVA and status epilepticus  intubated in the filed, extubated 8/31  AV dissociation noted on admission, resolved, likely due to hypoxia at the time of acute event  Intraseptal aneurysm noted on prior echocardiogram current study technically suboptimal    con't current management  consider repeating Echocardiogram  Patient is recommended for implantable loop recorder placement for long term cardiac monitoring  Risks and benefits of procedure discussed with patient and daughter at the bedside.  they will think about it  will follow up    Marquis Mccormack [phone]   71y Male with h/o DM, HTN, HLD, admitted with CVA and status epilepticus  intubated in the filed, extubated 8/31  AV dissociation noted on admission, resolved, likely due to hypoxia at the time of acute event  Intraseptal aneurysm noted on prior echocardiogram current study technically suboptimal    con't current management  consider repeating Echocardiogram  Patient is recommended for implantable loop recorder placement for long term cardiac monitoring  Risks and benefits of procedure discussed with patient and daughter at the bedside.  they will think about it  will follow up    Daughter Arlethisaialexandre Marcial [phone]

## 2021-09-05 LAB
ALBUMIN SERPL ELPH-MCNC: 2.8 G/DL — LOW (ref 3.5–5.2)
ALP SERPL-CCNC: 117 U/L — HIGH (ref 30–115)
ALT FLD-CCNC: 26 U/L — SIGNIFICANT CHANGE UP (ref 0–41)
ANION GAP SERPL CALC-SCNC: 9 MMOL/L — SIGNIFICANT CHANGE UP (ref 7–14)
AST SERPL-CCNC: 20 U/L — SIGNIFICANT CHANGE UP (ref 0–41)
BASOPHILS # BLD AUTO: 0.03 K/UL — SIGNIFICANT CHANGE UP (ref 0–0.2)
BASOPHILS NFR BLD AUTO: 0.5 % — SIGNIFICANT CHANGE UP (ref 0–1)
BILIRUB SERPL-MCNC: 0.4 MG/DL — SIGNIFICANT CHANGE UP (ref 0.2–1.2)
BUN SERPL-MCNC: 12 MG/DL — SIGNIFICANT CHANGE UP (ref 10–20)
CALCIUM SERPL-MCNC: 8.2 MG/DL — LOW (ref 8.5–10.1)
CHLORIDE SERPL-SCNC: 109 MMOL/L — SIGNIFICANT CHANGE UP (ref 98–110)
CO2 SERPL-SCNC: 28 MMOL/L — SIGNIFICANT CHANGE UP (ref 17–32)
CREAT SERPL-MCNC: 0.8 MG/DL — SIGNIFICANT CHANGE UP (ref 0.7–1.5)
CULTURE RESULTS: SIGNIFICANT CHANGE UP
EOSINOPHIL # BLD AUTO: 0.15 K/UL — SIGNIFICANT CHANGE UP (ref 0–0.7)
EOSINOPHIL NFR BLD AUTO: 2.5 % — SIGNIFICANT CHANGE UP (ref 0–8)
GLUCOSE BLDC GLUCOMTR-MCNC: 164 MG/DL — HIGH (ref 70–99)
GLUCOSE BLDC GLUCOMTR-MCNC: 171 MG/DL — HIGH (ref 70–99)
GLUCOSE BLDC GLUCOMTR-MCNC: 189 MG/DL — HIGH (ref 70–99)
GLUCOSE BLDC GLUCOMTR-MCNC: 255 MG/DL — HIGH (ref 70–99)
GLUCOSE SERPL-MCNC: 174 MG/DL — HIGH (ref 70–99)
HCT VFR BLD CALC: 28.8 % — LOW (ref 42–52)
HGB BLD-MCNC: 9 G/DL — LOW (ref 14–18)
IMM GRANULOCYTES NFR BLD AUTO: 4 % — HIGH (ref 0.1–0.3)
LYMPHOCYTES # BLD AUTO: 0.77 K/UL — LOW (ref 1.2–3.4)
LYMPHOCYTES # BLD AUTO: 12.8 % — LOW (ref 20.5–51.1)
MAGNESIUM SERPL-MCNC: 2.1 MG/DL — SIGNIFICANT CHANGE UP (ref 1.8–2.4)
MCHC RBC-ENTMCNC: 28.9 PG — SIGNIFICANT CHANGE UP (ref 27–31)
MCHC RBC-ENTMCNC: 31.3 G/DL — LOW (ref 32–37)
MCV RBC AUTO: 92.6 FL — SIGNIFICANT CHANGE UP (ref 80–94)
MONOCYTES # BLD AUTO: 0.67 K/UL — HIGH (ref 0.1–0.6)
MONOCYTES NFR BLD AUTO: 11.1 % — HIGH (ref 1.7–9.3)
NEUTROPHILS # BLD AUTO: 4.16 K/UL — SIGNIFICANT CHANGE UP (ref 1.4–6.5)
NEUTROPHILS NFR BLD AUTO: 69.1 % — SIGNIFICANT CHANGE UP (ref 42.2–75.2)
NRBC # BLD: 0 /100 WBCS — SIGNIFICANT CHANGE UP (ref 0–0)
PLATELET # BLD AUTO: 312 K/UL — SIGNIFICANT CHANGE UP (ref 130–400)
POTASSIUM SERPL-MCNC: 4.1 MMOL/L — SIGNIFICANT CHANGE UP (ref 3.5–5)
POTASSIUM SERPL-SCNC: 4.1 MMOL/L — SIGNIFICANT CHANGE UP (ref 3.5–5)
PROT SERPL-MCNC: 4.9 G/DL — LOW (ref 6–8)
RBC # BLD: 3.11 M/UL — LOW (ref 4.7–6.1)
RBC # FLD: 13.2 % — SIGNIFICANT CHANGE UP (ref 11.5–14.5)
SODIUM SERPL-SCNC: 146 MMOL/L — SIGNIFICANT CHANGE UP (ref 135–146)
SPECIMEN SOURCE: SIGNIFICANT CHANGE UP
VANCOMYCIN TROUGH SERPL-MCNC: 11.7 UG/ML — HIGH (ref 5–10)
VANCOMYCIN TROUGH SERPL-MCNC: 12.8 UG/ML — HIGH (ref 5–10)
WBC # BLD: 6.02 K/UL — SIGNIFICANT CHANGE UP (ref 4.8–10.8)
WBC # FLD AUTO: 6.02 K/UL — SIGNIFICANT CHANGE UP (ref 4.8–10.8)

## 2021-09-05 PROCEDURE — 99232 SBSQ HOSP IP/OBS MODERATE 35: CPT

## 2021-09-05 RX ORDER — VANCOMYCIN HCL 1 G
1250 VIAL (EA) INTRAVENOUS
Refills: 0 | Status: DISCONTINUED | OUTPATIENT
Start: 2021-09-05 | End: 2021-09-05

## 2021-09-05 RX ADMIN — Medication 81 MILLIGRAM(S): at 11:14

## 2021-09-05 RX ADMIN — CHLORHEXIDINE GLUCONATE 1 APPLICATION(S): 213 SOLUTION TOPICAL at 06:58

## 2021-09-05 RX ADMIN — CLOPIDOGREL BISULFATE 75 MILLIGRAM(S): 75 TABLET, FILM COATED ORAL at 11:14

## 2021-09-05 RX ADMIN — Medication 2: at 08:03

## 2021-09-05 RX ADMIN — OLANZAPINE 5 MILLIGRAM(S): 15 TABLET, FILM COATED ORAL at 22:49

## 2021-09-05 RX ADMIN — Medication 3 MILLILITER(S): at 20:22

## 2021-09-05 RX ADMIN — LEVETIRACETAM 400 MILLIGRAM(S): 250 TABLET, FILM COATED ORAL at 08:04

## 2021-09-05 RX ADMIN — Medication 2: at 22:49

## 2021-09-05 RX ADMIN — PANTOPRAZOLE SODIUM 40 MILLIGRAM(S): 20 TABLET, DELAYED RELEASE ORAL at 11:15

## 2021-09-05 RX ADMIN — Medication 3 MILLILITER(S): at 09:28

## 2021-09-05 RX ADMIN — ATORVASTATIN CALCIUM 40 MILLIGRAM(S): 80 TABLET, FILM COATED ORAL at 22:48

## 2021-09-05 RX ADMIN — LISINOPRIL 10 MILLIGRAM(S): 2.5 TABLET ORAL at 06:58

## 2021-09-05 RX ADMIN — LACOSAMIDE 120 MILLIGRAM(S): 50 TABLET ORAL at 06:58

## 2021-09-05 RX ADMIN — INSULIN GLARGINE 20 UNIT(S): 100 INJECTION, SOLUTION SUBCUTANEOUS at 22:48

## 2021-09-05 RX ADMIN — LACOSAMIDE 120 MILLIGRAM(S): 50 TABLET ORAL at 17:24

## 2021-09-05 RX ADMIN — SENNA PLUS 2 TABLET(S): 8.6 TABLET ORAL at 22:49

## 2021-09-05 RX ADMIN — ENOXAPARIN SODIUM 40 MILLIGRAM(S): 100 INJECTION SUBCUTANEOUS at 11:15

## 2021-09-05 RX ADMIN — Medication 50 MILLIGRAM(S): at 13:41

## 2021-09-05 RX ADMIN — Medication 50 MILLIGRAM(S): at 08:26

## 2021-09-05 RX ADMIN — LEVETIRACETAM 400 MILLIGRAM(S): 250 TABLET, FILM COATED ORAL at 20:16

## 2021-09-05 RX ADMIN — Medication 2: at 16:40

## 2021-09-05 RX ADMIN — Medication 50 MILLIGRAM(S): at 22:47

## 2021-09-05 RX ADMIN — Medication 6: at 11:38

## 2021-09-05 RX ADMIN — Medication 4 MILLIGRAM(S): at 22:48

## 2021-09-05 NOTE — PROGRESS NOTE ADULT - SUBJECTIVE AND OBJECTIVE BOX
Patient is a 71y old  Male who presents with a chief complaint of AMS (04 Sep 2021 13:13)      INTERVAL HPI/OVERNIGHT EVENTS: no acute overnight events noted.   Patient seen and examined at bedside.     REVIEW OF SYSTEMS: negative  Vital Signs Last 24 Hrs  T(C): 37 (05 Sep 2021 04:20), Max: 37 (05 Sep 2021 04:20)  T(F): 98.6 (05 Sep 2021 04:20), Max: 98.6 (05 Sep 2021 04:20)  HR: 91 (05 Sep 2021 04:20) (84 - 101)  BP: 158/71 (05 Sep 2021 04:20) (134/62 - 158/71)  BP(mean): 89 (04 Sep 2021 21:36) (89 - 89)  RR: 20 (05 Sep 2021 04:20) (18 - 20)  SpO2: 95% (05 Sep 2021 04:20) (95% - 95%)    PHYSICAL EXAM:   NAD; Normocephalic;   LUNGS - no wheezing  HEART: S1 S2+   ABDOMEN: Soft, Nontender, non distended  EXTREMITIES: no cyanosis; no edema  NERVOUS SYSTEM:  Awake and alert; no focal neuro deficits appreciated    LABS:                        9.0    6.02  )-----------( 312      ( 05 Sep 2021 06:43 )             28.8     09-05    146  |  109  |  12  ----------------------------<  174<H>  4.1   |  28  |  0.8    Ca    8.2<L>      05 Sep 2021 06:43  Mg     2.1     09-05    TPro  4.9<L>  /  Alb  2.8<L>  /  TBili  0.4  /  DBili  x   /  AST  20  /  ALT  26  /  AlkPhos  117<H>  09-05        CAPILLARY BLOOD GLUCOSE      POCT Blood Glucose.: 164 mg/dL (05 Sep 2021 07:40)  POCT Blood Glucose.: 107 mg/dL (04 Sep 2021 21:53)  POCT Blood Glucose.: 226 mg/dL (04 Sep 2021 16:13)  POCT Blood Glucose.: 169 mg/dL (04 Sep 2021 11:31)      Medications:  MEDICATIONS  (STANDING):  albuterol/ipratropium for Nebulization 3 milliLiter(s) Nebulizer every 6 hours  aspirin  chewable 81 milliGRAM(s) Oral daily  atorvastatin 40 milliGRAM(s) Oral at bedtime  aztreonam  IVPB 1000 milliGRAM(s) IV Intermittent every 8 hours  chlorhexidine 4% Liquid 1 Application(s) Topical <User Schedule>  clopidogrel Tablet 75 milliGRAM(s) Oral daily  doxazosin 4 milliGRAM(s) Oral at bedtime  enoxaparin Injectable 40 milliGRAM(s) SubCutaneous daily  insulin glargine Injectable (LANTUS) 20 Unit(s) SubCutaneous at bedtime  insulin lispro (ADMELOG) corrective regimen sliding scale   SubCutaneous Before meals and at bedtime  lacosamide IVPB 100 milliGRAM(s) IV Intermittent every 12 hours  levETIRAcetam  IVPB 1000 milliGRAM(s) IV Intermittent every 12 hours  lisinopril 10 milliGRAM(s) Oral daily  OLANZapine 5 milliGRAM(s) Oral at bedtime  pantoprazole   Suspension 40 milliGRAM(s) Oral daily  senna 2 Tablet(s) Oral at bedtime  vancomycin  IVPB 1250 milliGRAM(s) IV Intermittent two times a day    MEDICATIONS  (PRN):  acetaminophen   Tablet .. 650 milliGRAM(s) Oral every 6 hours PRN Temp greater or equal to 38C (100.4F), Mild Pain (1 - 3), Moderate Pain (4 - 6), Severe Pain (7 - 10)  haloperidol    Injectable 2 milliGRAM(s) IntraMuscular every 8 hours PRN agitation/combative/self destructive  labetalol Injectable 10 milliGRAM(s) IV Push every 6 hours PRN Systolic blood pressure >         A/P:-  Pt is seen and evaluated by bedside.     Provider Handoff:  Pending:   Dispo:   Family discussion:   Plan of care was discussed with patient ; all questions and concerns were addressed and care was aligned with patient's wishes.   Patient is a 71y old  Male who presents with a chief complaint of AMS (04 Sep 2021 13:13)      INTERVAL HPI/OVERNIGHT EVENTS: no acute overnight events noted.   Patient seen and examined at bedside.   patient is back to his baseline this morning   comfortably sitting in bed - reports feeling better but tired     REVIEW OF SYSTEMS: negative  Vital Signs Last 24 Hrs  T(C): 37 (05 Sep 2021 04:20), Max: 37 (05 Sep 2021 04:20)  T(F): 98.6 (05 Sep 2021 04:20), Max: 98.6 (05 Sep 2021 04:20)  HR: 91 (05 Sep 2021 04:20) (84 - 101)  BP: 158/71 (05 Sep 2021 04:20) (134/62 - 158/71)  BP(mean): 89 (04 Sep 2021 21:36) (89 - 89)  RR: 20 (05 Sep 2021 04:20) (18 - 20)  SpO2: 95% (05 Sep 2021 04:20) (95% - 95%)    PHYSICAL EXAM:   NAD; Normocephalic;   LUNGS - no wheezing  HEART: S1 S2+   ABDOMEN: Soft, Nontender, non distended  EXTREMITIES: no cyanosis; no edema  NERVOUS SYSTEM:  Awake and alert; no focal neuro deficits appreciated    LABS:                        9.0    6.02  )-----------( 312      ( 05 Sep 2021 06:43 )             28.8     09-05    146  |  109  |  12  ----------------------------<  174<H>  4.1   |  28  |  0.8    Ca    8.2<L>      05 Sep 2021 06:43  Mg     2.1     09-05    TPro  4.9<L>  /  Alb  2.8<L>  /  TBili  0.4  /  DBili  x   /  AST  20  /  ALT  26  /  AlkPhos  117<H>  09-05        CAPILLARY BLOOD GLUCOSE      POCT Blood Glucose.: 164 mg/dL (05 Sep 2021 07:40)  POCT Blood Glucose.: 107 mg/dL (04 Sep 2021 21:53)  POCT Blood Glucose.: 226 mg/dL (04 Sep 2021 16:13)  POCT Blood Glucose.: 169 mg/dL (04 Sep 2021 11:31)      Medications:  MEDICATIONS  (STANDING):  albuterol/ipratropium for Nebulization 3 milliLiter(s) Nebulizer every 6 hours  aspirin  chewable 81 milliGRAM(s) Oral daily  atorvastatin 40 milliGRAM(s) Oral at bedtime  aztreonam  IVPB 1000 milliGRAM(s) IV Intermittent every 8 hours  chlorhexidine 4% Liquid 1 Application(s) Topical <User Schedule>  clopidogrel Tablet 75 milliGRAM(s) Oral daily  doxazosin 4 milliGRAM(s) Oral at bedtime  enoxaparin Injectable 40 milliGRAM(s) SubCutaneous daily  insulin glargine Injectable (LANTUS) 20 Unit(s) SubCutaneous at bedtime  insulin lispro (ADMELOG) corrective regimen sliding scale   SubCutaneous Before meals and at bedtime  lacosamide IVPB 100 milliGRAM(s) IV Intermittent every 12 hours  levETIRAcetam  IVPB 1000 milliGRAM(s) IV Intermittent every 12 hours  lisinopril 10 milliGRAM(s) Oral daily  OLANZapine 5 milliGRAM(s) Oral at bedtime  pantoprazole   Suspension 40 milliGRAM(s) Oral daily  senna 2 Tablet(s) Oral at bedtime  vancomycin  IVPB 1250 milliGRAM(s) IV Intermittent two times a day    MEDICATIONS  (PRN):  acetaminophen   Tablet .. 650 milliGRAM(s) Oral every 6 hours PRN Temp greater or equal to 38C (100.4F), Mild Pain (1 - 3), Moderate Pain (4 - 6), Severe Pain (7 - 10)  haloperidol    Injectable 2 milliGRAM(s) IntraMuscular every 8 hours PRN agitation/combative/self destructive  labetalol Injectable 10 milliGRAM(s) IV Push every 6 hours PRN Systolic blood pressure >

## 2021-09-05 NOTE — CONSULT NOTE ADULT - TIME BILLING
I have personally seen and examined this patient.    I have reviewed all pertinent clinical information and reviewed all relevant imaging and diagnostic studies personally.   I counseled the patient about diagnostic testing and treatment plan. All questions were answered.   I discussed recommendations with the primary team.
Cryptogenic stroke

## 2021-09-05 NOTE — CONSULT NOTE ADULT - SUBJECTIVE AND OBJECTIVE BOX
JOSÉ MANUEL PORTER  71y, Male  Allergy: No Known Allergies      CHIEF COMPLAINT: AMS (04 Sep 2021 13:13)      LOS  12d    HPI:  72 y/o M with pmh of dm, osteomyelitis, gerd, depression, hld, htn presenting to the ED for above cc.  the patient was doing last night until he was unable to talk and walk independently. As per his ex wife the right sided then started shaking. She called 911.  On presentation the patient had status epilepticus and he received 4 mg of lorazepam.  CT brain done and an acute stroke could not be excluded because of technical difficulties.  His blood glucose was found to be more than 600.  he is admitted for [facility]    the ex wife said that he has bipolar and he was on a medication, although this is not found in the chart. She also does not recall his meds  sp intubation for airway protection, on propofol, INSULI, 1/2 ns 150 cc/h (24 Aug 2021 04:34)      INFECTIOUS DISEASE HISTORY:  History as above.  Found to be status epilepticus, intubated on admission, extubated on 8/31.  Imaging revealing acute infarct in left occipital cortex.  ID consulted for pneumonia  Started on vancomycin/zosyn 8/31 --> vancomycin cefepime 8/31-9/1 --> vancomycin/aztreoname 9/1-9/4  Discussed with hospitalist -- mental status with waxing and waning, however today appears well.   He is currently on 2L NC.   Has productive cough.   Denies any chest pain or shortness of breath.   Denieas nausea, vomiting, abdominal pain.     PAST MEDICAL & SURGICAL HISTORY:  DM (diabetes mellitus)  12/27/18 hgb aic  11.2    HTN (hypertension)    Dyslipidemia    Diabetic foot ulcer    Depression    GERD (gastroesophageal reflux disease)    Diabetes    Toe amputation status, right  (2008)    History of amputation of toe  LT -partial 1st toe        FAMILY HISTORY  Family history of lung cancer (Mother)    Family history of ischemic heart disease (IHD) (Father)        SOCIAL HISTORY  Social History:  non smoker  non alcoholic  ex wive states he is on drugs but she does not know what type (24 Aug 2021 04:34)        ROS  General: Denies rigors, nightsweats  HEENT: Denies headache, rhinorrhea, sore throat, eye pain  CV: Denies CP, palpitations  PULM: Denies wheezing, hemoptysis  GI: Denies hematemesis, hematochezia, melena  : Denies discharge, hematuria  MSK: Denies arthralgias, myalgias  SKIN: Denies rash, lesions  NEURO: Denies paresthesias, weakness  PSYCH: Denies depression, anxiety    VITALS:  T(F): 98.6, Max: 98.6 (09-05-21 @ 04:20)  HR: 91  BP: 158/71  RR: 20Vital Signs Last 24 Hrs  T(C): 37 (05 Sep 2021 04:20), Max: 37 (05 Sep 2021 04:20)  T(F): 98.6 (05 Sep 2021 04:20), Max: 98.6 (05 Sep 2021 04:20)  HR: 91 (05 Sep 2021 04:20) (84 - 101)  BP: 158/71 (05 Sep 2021 04:20) (133/60 - 158/71)  BP(mean): 89 (04 Sep 2021 21:36) (89 - 89)  RR: 20 (05 Sep 2021 04:20) (17 - 20)  SpO2: 95% (05 Sep 2021 04:20) (95% - 98%)    PHYSICAL EXAM:  Gen: NAD, resting in bed  HEENT: Normocephalic, atraumatic  Neck: supple, no lymphadenopathy  CV: Regular rate & regular rhythm  Lungs: decreased BS at bases, no fremitus  Abdomen: Soft, BS present  Ext: Warm, well perfused  Neuro: non focal, awake  Skin: no rash, no erythema  Lines: no phlebitis    TESTS & MEASUREMENTS:                  Culture - Blood (collected 08-31-21 @ 06:30)  Source: .Blood Blood  Preliminary Report (09-01-21 @ 14:01):    No growth to date.    Culture - Sputum (collected 08-31-21 @ 06:30)  Source: .Sputum Sputum  Gram Stain (08-31-21 @ 22:10):    Numerous polymorphonuclear leukocytes per low power field    Rare Squamous epithelial cells per low power field    Numerous Gram positive cocci in pairs per oil power field    Rare Gram Negative Rods per oil power field  Final Report (09-02-21 @ 21:31):    Normal Respiratory Chelsy present    Culture - Sputum (collected 08-29-21 @ 03:25)  Source: .Sputum Sputum trap  Gram Stain (08-29-21 @ 14:18):    Moderate polymorphonuclear leukocytes per low power field    No Squamous epithelial cells per low power field    Rare Gram Negative Rods per oil power field  Final Report (08-31-21 @ 10:13):    Normal Respiratory Chelsy present    Culture - Blood (collected 08-28-21 @ 20:29)  Source: .Blood None  Final Report (09-03-21 @ 13:00):    No Growth Final    Culture - Blood (collected 08-26-21 @ 11:48)  Source: .Blood Blood-Arterial  Final Report (08-31-21 @ 23:01):    No Growth Final    Culture - Sputum (collected 08-24-21 @ 14:30)  Source: .Sputum Deep tracheal intubation  Gram Stain (08-25-21 @ 06:58):    Numerous polymorphonuclear leukocytes per low power field    No Squamous epithelial cells per low power field    Numerous Gram Positive Cocci in Pairs and Chains seen per oil power field    Few Gram Negative Rods seen per oil power field  Final Report (08-27-21 @ 17:38):    Normal Respiratory Chelsy present    Culture - Urine (collected 08-24-21 @ 06:11)  Source: Catheterized Catheterized  Final Report (08-25-21 @ 10:40):    No growth    Culture - Urine (collected 08-24-21 @ 02:47)  Source: Clean Catch Clean Catch (Midstream)  Final Report (08-25-21 @ 05:30):    No growth    Culture - Tissue with Gram Stain (collected 03-10-21 @ 21:36)  Source: .Tissue None  Gram Stain (03-11-21 @ 04:59):    No polymorphonuclear cells seen per low power field    No organisms seen per oil power field  Final Report (03-16-21 @ 10:46):    No growth    Culture - Blood (collected 03-08-21 @ 14:25)  Source: .Blood Blood-Peripheral  Final Report (03-13-21 @ 22:01):    No Growth Final    Culture - Blood (collected 03-08-21 @ 14:25)  Source: .Blood Blood-Peripheral  Final Report (03-13-21 @ 22:01):    No Growth Final    Culture - Tissue with Gram Stain (collected 10-30-20 @ 11:53)  Source: .Tissue None  Gram Stain (10-31-20 @ 01:19):    Rare polymorphonuclear leukocytes seen per low power field    No organisms seen per oil power field  Final Report (11-02-20 @ 18:13):    Rare Coag Negative Staphylococcus  Organism: Coag Negative Staphylococcus (11-02-20 @ 18:13)  Organism: Coag Negative Staphylococcus (11-02-20 @ 18:13)      -  Ampicillin/Sulbactam: S <=8/4      -  Cefazolin: S <=4      -  Clindamycin: S <=0.25      -  Erythromycin: S <=0.25      -  Gentamicin: S <=1 Should not be used as monotherapy      -  Oxacillin: S <=0.25      -  Penicillin: R 2      -  RIF- Rifampin: S <=1 Should not be used as monotherapy      -  Tetra/Doxy: S <=1      -  Trimethoprim/Sulfamethoxazole: S <=0.5/9.5      -  Vancomycin: S 1      Method Type: LINDSEY        Lactate, Blood: 1.9 mmol/L (08-31-21 @ 08:31)      INFECTIOUS DISEASES TESTING  COVID-19 PCR: NotDetec (09-02-21 @ 14:45)  Procalcitonin, Serum: 0.25 ng/mL (08-31-21 @ 08:31)  Procalcitonin, Serum: 0.24 ng/mL (08-31-21 @ 06:30)  Procalcitonin, Serum: 0.13 ng/mL (08-28-21 @ 14:10)  Procalcitonin, Serum: 0.11 ng/mL (08-27-21 @ 04:30)  MRSA PCR Result.: Negative (08-26-21 @ 11:14)  Procalcitonin, Serum: 19.00 ng/mL (08-26-21 @ 04:30)  Procalcitonin, Serum: 0.24 ng/mL (08-25-21 @ 04:30)  COVID-19 PCR: NotDetec (08-24-21 @ 03:20)  MRSA PCR Result.: Negative (03-09-21 @ 09:20)  COVID-19 PCR: NotDetec (03-08-21 @ 17:00)      RADIOLOGY & ADDITIONAL TESTS:  I have personally reviewed the last Chest xray  CXR      CT      CARDIOLOGY TESTING  12 Lead ECG:  Ventricular Rate 85 BPM    Atrial Rate 85 BPM    P-R Interval 142 ms    QRS Duration 64 ms    Q-T Interval 368 ms    QTC Calculation(Bazett) 437 ms    P Axis 64 degrees    R Axis 23 degrees    T Axis -15 degrees    Diagnosis Line Normal sinus rhythm  Possible Inferior infarct , age undetermined  Nonspecific T wave abnormality  Abnormal ECG    Confirmed by Yolanda Salas MD ([id number]) on 9/4/2021 9:40:43 AM (09-04-21 @ 08:04)  12 Lead ECG:  Ventricular Rate 59 BPM    Atrial Rate 59 BPM    P-R Interval 150 ms    QRS Duration 84 ms    Q-T Interval 426 ms    QTC Calculation(Bazett) 421 ms    P Axis 48 degrees    R Axis 20 degrees    T Axis 46 degrees    Diagnosis Line Sinus bradycardia  Otherwise normal ECG    Confirmed by DAVE COWAN MD [id number]) on 8/29/2021 8:41:08 AM (08-28-21 @ 05:54)      MEDICATIONS  albuterol/ipratropium for Nebulization 3 Nebulizer every 6 hours  aspirin  chewable 81 Oral daily  atorvastatin 40 Oral at bedtime  aztreonam  IVPB 1000 IV Intermittent every 8 hours  chlorhexidine 4% Liquid 1 Topical <User Schedule>  clopidogrel Tablet 75 Oral daily  doxazosin 4 Oral at bedtime  enoxaparin Injectable 40 SubCutaneous daily  insulin glargine Injectable (LANTUS) 20 SubCutaneous at bedtime  insulin lispro (ADMELOG) corrective regimen sliding scale  SubCutaneous Before meals and at bedtime  lacosamide IVPB 100 IV Intermittent every 12 hours  levETIRAcetam  IVPB 1000 IV Intermittent every 12 hours  lisinopril 10 Oral daily  OLANZapine 5 Oral at bedtime  pantoprazole   Suspension 40 Oral daily  senna 2 Oral at bedtime      Weight  Weight (kg): 88.2 (08-24-21 @ 06:35)    ANTIBIOTICS:  aztreonam  IVPB 1000 milliGRAM(s) IV Intermittent every 8 hours      ALLERGIES:  No Known Allergies

## 2021-09-05 NOTE — PROGRESS NOTE ADULT - ASSESSMENT
70y M w/ hx of DMII, admitted due to seizure witnessed by wife. Pt admitted to ICU with DKA/HHS. Had seizures consistent with tonic-clonic seizure x2 in ED on 824. Pt was loaded with 3g Keppra and transferred to MICU where he was started on an insulin drip. He was subsequently intubated for airway protection and started on Versed gtt. Course was complicated by respiratory failure, pneumonia, encephalopathy, metabolic derangements. Tox screen was (_+) for benzos. Pt was extubated on . Pt was placed on VEEG which was negative for seizure. However, Vimpat was added to regime due to changes on EEG. Initial CTH/CTA was unremarkable. MRI on  revealed puncate acute infarcts involving L occipital cortex. CT Head on 9/3 revealed effusions consistent with mastoiditis.     Plan:   1. encephalopathy - delirium   2. pneumonia - aspiration vs VAP    3. acute L occipital infarct   4. DMII - HHS/DKA  5. status epilepticus   6. HTN   7. urinary retention    - pt is fully functional at baseline   - pt very combative, kicking roommate and staff, not allowing people to come near him (9/3)   - as per psych, pt has delirium  - per psych:  Haldol Standinmg @ 6am, 1mg @ 1pm, 2mg in the evening. Additional PRN: 2mg q8h;   - monitor QTc  - continue vanco and azactam --> trough 12  - sputum  (+) gram neg rods and gram positive cocci   - follow ID recs   - continue aspirin/plavix and atorvastatin   - loop recorder prior to discharge  - basal bolus insulin regimen   - A1c of 5.7  - continue keppra and vimpat   - continue lisinopril and cardura   - TOV when patient is more mobile   - lovenox subq for DVT proph .  70y M w/ hx of DMII, admitted due to seizure witnessed by wife. Pt admitted to ICU with DKA/HHS. Had seizures consistent with tonic-clonic seizure x2 in ED on 824. Pt was loaded with 3g Keppra and transferred to MICU where he was started on an insulin drip. He was subsequently intubated for airway protection and started on Versed gtt. Course was complicated by respiratory failure, pneumonia, encephalopathy, metabolic derangements. Tox screen was (_+) for benzos. Pt was extubated on . Pt was placed on VEEG which was negative for seizure. However, Vimpat was added to regime due to changes on EEG. Initial CTH/CTA was unremarkable. MRI on  revealed puncate acute infarcts involving L occipital cortex. CT Head on 9/3 revealed effusions consistent with mastoiditis.     Plan:   1. encephalopathy - delirium - resolved   2. pneumonia - aspiration vs VAP    3. acute L occipital infarct   4. DMII - HHS/DKA  5. status epilepticus   6. HTN   7. urinary retention    - pt is fully functional at baseline - mental status at baseline this morning - AAOx3 on exam   - pt very combative, kicking roommate and staff, not allowing people to come near him (9/3)   - as per psych, pt has delirium  - per psych:  Haldol Standinmg @ 6am, 1mg @ 1pm, 2mg in the evening. Additional PRN: 2mg q8h;   - monitor QTc  - continue azactam (start date ) - total 7 days of antibiotics   - dc vanco   - sputum  (+) gram neg rods and gram positive cocci   - ID recs noted   - continue aspirin/plavix and atorvastatin   - loop recorder prior to discharge  - basal bolus insulin regimen   - A1c of 5.7  - continue keppra and vimpat   - continue lisinopril and cardura   - TOV when patient is more mobile - will get PT and ambulate patient today - TOV tomorrow   - OOB  - lovenox subq for DVT proph .

## 2021-09-05 NOTE — CONSULT NOTE ADULT - ASSESSMENT
ASSESSMENT  72 y/o M with pmh of dm, osteomyelitis, gerd, depression, hld, htn presenting to the ED with Seizures    IMPRESSION  #Status epilepticus  #Acute L occipital infarct  #Possible penumonia - CXR with bilateral basilar opacities  - Sputum Cx 8/31 NG  - Procalcitonin, Serum: 0.25 (08.31.21 @ 08:31)      #Obesity BMI (kg/m2): 28.7  #Abx allergy: NKDA    Antibiotic Course  vancomycin/zosyn 8/31 --> vancomycin cefepime 8/31-9/1 --> vancomycin/aztreoname 9/1-9/4    RECOMMENDATIONS  - mental status seems improved   - can continue with aztreonam until 9/6 which would be total of 7 days  - recall PRN     Please call or message on [employer] Teams if with any questions.  Spectra [id number]

## 2021-09-05 NOTE — CONSULT NOTE ADULT - CONSULT REQUESTED DATE/TIME
25-Aug-2021 17:22
04-Sep-2021 13:13
24-Aug-2021 02:13
02-Sep-2021 15:48
05-Sep-2021 12:44
unable to perform

## 2021-09-06 LAB
ALBUMIN SERPL ELPH-MCNC: 2.7 G/DL — LOW (ref 3.5–5.2)
ALP SERPL-CCNC: 111 U/L — SIGNIFICANT CHANGE UP (ref 30–115)
ALT FLD-CCNC: 20 U/L — SIGNIFICANT CHANGE UP (ref 0–41)
ANION GAP SERPL CALC-SCNC: 8 MMOL/L — SIGNIFICANT CHANGE UP (ref 7–14)
AST SERPL-CCNC: 17 U/L — SIGNIFICANT CHANGE UP (ref 0–41)
BASOPHILS # BLD AUTO: 0.02 K/UL — SIGNIFICANT CHANGE UP (ref 0–0.2)
BASOPHILS NFR BLD AUTO: 0.3 % — SIGNIFICANT CHANGE UP (ref 0–1)
BILIRUB SERPL-MCNC: 0.3 MG/DL — SIGNIFICANT CHANGE UP (ref 0.2–1.2)
BUN SERPL-MCNC: 7 MG/DL — LOW (ref 10–20)
CALCIUM SERPL-MCNC: 8.1 MG/DL — LOW (ref 8.5–10.1)
CHLORIDE SERPL-SCNC: 108 MMOL/L — SIGNIFICANT CHANGE UP (ref 98–110)
CO2 SERPL-SCNC: 28 MMOL/L — SIGNIFICANT CHANGE UP (ref 17–32)
CREAT SERPL-MCNC: 0.7 MG/DL — SIGNIFICANT CHANGE UP (ref 0.7–1.5)
EOSINOPHIL # BLD AUTO: 0.08 K/UL — SIGNIFICANT CHANGE UP (ref 0–0.7)
EOSINOPHIL NFR BLD AUTO: 1.2 % — SIGNIFICANT CHANGE UP (ref 0–8)
GLUCOSE BLDC GLUCOMTR-MCNC: 172 MG/DL — HIGH (ref 70–99)
GLUCOSE BLDC GLUCOMTR-MCNC: 177 MG/DL — HIGH (ref 70–99)
GLUCOSE BLDC GLUCOMTR-MCNC: 210 MG/DL — HIGH (ref 70–99)
GLUCOSE BLDC GLUCOMTR-MCNC: 230 MG/DL — HIGH (ref 70–99)
GLUCOSE BLDC GLUCOMTR-MCNC: 254 MG/DL — HIGH (ref 70–99)
GLUCOSE SERPL-MCNC: 166 MG/DL — HIGH (ref 70–99)
HCT VFR BLD CALC: 26.7 % — LOW (ref 42–52)
HGB BLD-MCNC: 8.3 G/DL — LOW (ref 14–18)
IMM GRANULOCYTES NFR BLD AUTO: 4.4 % — HIGH (ref 0.1–0.3)
LYMPHOCYTES # BLD AUTO: 0.65 K/UL — LOW (ref 1.2–3.4)
LYMPHOCYTES # BLD AUTO: 10.1 % — LOW (ref 20.5–51.1)
MAGNESIUM SERPL-MCNC: 2 MG/DL — SIGNIFICANT CHANGE UP (ref 1.8–2.4)
MCHC RBC-ENTMCNC: 29.2 PG — SIGNIFICANT CHANGE UP (ref 27–31)
MCHC RBC-ENTMCNC: 31.1 G/DL — LOW (ref 32–37)
MCV RBC AUTO: 94 FL — SIGNIFICANT CHANGE UP (ref 80–94)
MONOCYTES # BLD AUTO: 0.6 K/UL — SIGNIFICANT CHANGE UP (ref 0.1–0.6)
MONOCYTES NFR BLD AUTO: 9.3 % — SIGNIFICANT CHANGE UP (ref 1.7–9.3)
NEUTROPHILS # BLD AUTO: 4.8 K/UL — SIGNIFICANT CHANGE UP (ref 1.4–6.5)
NEUTROPHILS NFR BLD AUTO: 74.7 % — SIGNIFICANT CHANGE UP (ref 42.2–75.2)
NRBC # BLD: 0 /100 WBCS — SIGNIFICANT CHANGE UP (ref 0–0)
PLATELET # BLD AUTO: 325 K/UL — SIGNIFICANT CHANGE UP (ref 130–400)
POTASSIUM SERPL-MCNC: 4.1 MMOL/L — SIGNIFICANT CHANGE UP (ref 3.5–5)
POTASSIUM SERPL-SCNC: 4.1 MMOL/L — SIGNIFICANT CHANGE UP (ref 3.5–5)
PROT SERPL-MCNC: 4.5 G/DL — LOW (ref 6–8)
RBC # BLD: 2.84 M/UL — LOW (ref 4.7–6.1)
RBC # FLD: 12.9 % — SIGNIFICANT CHANGE UP (ref 11.5–14.5)
SARS-COV-2 RNA SPEC QL NAA+PROBE: SIGNIFICANT CHANGE UP
SODIUM SERPL-SCNC: 144 MMOL/L — SIGNIFICANT CHANGE UP (ref 135–146)
WBC # BLD: 6.43 K/UL — SIGNIFICANT CHANGE UP (ref 4.8–10.8)
WBC # FLD AUTO: 6.43 K/UL — SIGNIFICANT CHANGE UP (ref 4.8–10.8)

## 2021-09-06 PROCEDURE — 99233 SBSQ HOSP IP/OBS HIGH 50: CPT | Mod: 57

## 2021-09-06 PROCEDURE — 99232 SBSQ HOSP IP/OBS MODERATE 35: CPT

## 2021-09-06 RX ADMIN — LISINOPRIL 10 MILLIGRAM(S): 2.5 TABLET ORAL at 06:21

## 2021-09-06 RX ADMIN — LEVETIRACETAM 400 MILLIGRAM(S): 250 TABLET, FILM COATED ORAL at 05:11

## 2021-09-06 RX ADMIN — ATORVASTATIN CALCIUM 40 MILLIGRAM(S): 80 TABLET, FILM COATED ORAL at 22:17

## 2021-09-06 RX ADMIN — Medication 4 MILLIGRAM(S): at 22:17

## 2021-09-06 RX ADMIN — LACOSAMIDE 120 MILLIGRAM(S): 50 TABLET ORAL at 05:25

## 2021-09-06 RX ADMIN — CHLORHEXIDINE GLUCONATE 1 APPLICATION(S): 213 SOLUTION TOPICAL at 05:26

## 2021-09-06 RX ADMIN — Medication 3 MILLILITER(S): at 10:05

## 2021-09-06 RX ADMIN — INSULIN GLARGINE 20 UNIT(S): 100 INJECTION, SOLUTION SUBCUTANEOUS at 22:16

## 2021-09-06 RX ADMIN — ENOXAPARIN SODIUM 40 MILLIGRAM(S): 100 INJECTION SUBCUTANEOUS at 11:27

## 2021-09-06 RX ADMIN — Medication 6: at 16:29

## 2021-09-06 RX ADMIN — PANTOPRAZOLE SODIUM 40 MILLIGRAM(S): 20 TABLET, DELAYED RELEASE ORAL at 11:25

## 2021-09-06 RX ADMIN — Medication 50 MILLIGRAM(S): at 14:10

## 2021-09-06 RX ADMIN — OLANZAPINE 5 MILLIGRAM(S): 15 TABLET, FILM COATED ORAL at 22:17

## 2021-09-06 RX ADMIN — Medication 2: at 07:54

## 2021-09-06 RX ADMIN — LEVETIRACETAM 400 MILLIGRAM(S): 250 TABLET, FILM COATED ORAL at 18:07

## 2021-09-06 RX ADMIN — Medication 50 MILLIGRAM(S): at 06:21

## 2021-09-06 RX ADMIN — Medication 4: at 22:17

## 2021-09-06 RX ADMIN — Medication 81 MILLIGRAM(S): at 11:25

## 2021-09-06 RX ADMIN — SENNA PLUS 2 TABLET(S): 8.6 TABLET ORAL at 22:17

## 2021-09-06 RX ADMIN — Medication 2: at 11:26

## 2021-09-06 RX ADMIN — LACOSAMIDE 120 MILLIGRAM(S): 50 TABLET ORAL at 18:07

## 2021-09-06 RX ADMIN — CLOPIDOGREL BISULFATE 75 MILLIGRAM(S): 75 TABLET, FILM COATED ORAL at 11:25

## 2021-09-06 RX ADMIN — Medication 50 MILLIGRAM(S): at 22:16

## 2021-09-06 NOTE — PROGRESS NOTE ADULT - SUBJECTIVE AND OBJECTIVE BOX
JOSÉ MANUEL PORTER 71y Male  MRN#: [id number]   CODE STATUS:________    SUBJECTIVE  Patient is a 71y old Male who presents with a chief complaint of AMS (05 Sep 2021 12:44)  Currently admitted to medicine with the primary diagnosis of Hyperglycemia     Today is hospital day 13d, and this morning he is resting comfortably and no overnight events    OBJECTIVE  PAST MEDICAL & SURGICAL HISTORY  DM (diabetes mellitus)  18 hgb aic  11.2    HTN (hypertension)    Dyslipidemia    Diabetic foot ulcer    Depression    GERD (gastroesophageal reflux disease)    Diabetes    Toe amputation status, right  ()    History of amputation of toe  LT -partial 1st toe      ALLERGIES:  No Known Allergies    MEDICATIONS:  STANDING MEDICATIONS  albuterol/ipratropium for Nebulization 3 milliLiter(s) Nebulizer every 6 hours  aspirin  chewable 81 milliGRAM(s) Oral daily  atorvastatin 40 milliGRAM(s) Oral at bedtime  aztreonam  IVPB 1000 milliGRAM(s) IV Intermittent every 8 hours  chlorhexidine 4% Liquid 1 Application(s) Topical <User Schedule>  clopidogrel Tablet 75 milliGRAM(s) Oral daily  doxazosin 4 milliGRAM(s) Oral at bedtime  enoxaparin Injectable 40 milliGRAM(s) SubCutaneous daily  insulin glargine Injectable (LANTUS) 20 Unit(s) SubCutaneous at bedtime  insulin lispro (ADMELOG) corrective regimen sliding scale   SubCutaneous Before meals and at bedtime  lacosamide IVPB 100 milliGRAM(s) IV Intermittent every 12 hours  levETIRAcetam  IVPB 1000 milliGRAM(s) IV Intermittent every 12 hours  lisinopril 10 milliGRAM(s) Oral daily  OLANZapine 5 milliGRAM(s) Oral at bedtime  pantoprazole   Suspension 40 milliGRAM(s) Oral daily  senna 2 Tablet(s) Oral at bedtime    PRN MEDICATIONS  acetaminophen   Tablet .. 650 milliGRAM(s) Oral every 6 hours PRN  haloperidol    Injectable 2 milliGRAM(s) IntraMuscular every 8 hours PRN  labetalol Injectable 10 milliGRAM(s) IV Push every 6 hours PRN      VITAL SIGNS: Last 24 Hours  T(C): 37.1 (06 Sep 2021 05:34), Max: 37.2 (05 Sep 2021 13:19)  T(F): 98.8 (06 Sep 2021 05:34), Max: 99 (05 Sep 2021 13:19)  HR: 88 (06 Sep 2021 05:34) (88 - 96)  BP: 156/72 (06 Sep 2021 05:34) (139/63 - 163/73)  BP(mean): 104 (06 Sep 2021 05:34) (90 - 105)  RR: 18 (05 Sep 2021 22:01) (18 - 20)  SpO2: 95% (05 Sep 2021 09:00) (95% - 95%)    LABS:                        9.0    6.02  )-----------( 312      ( 05 Sep 2021 06:43 )             28.8         146  |  109  |  12  ----------------------------<  174<H>  4.1   |  28  |  0.8    Ca    8.2<L>      05 Sep 2021 06:43  Mg     2.1         TPro  4.9<L>  /  Alb  2.8<L>  /  TBili  0.4  /  DBili  x   /  AST  20  /  ALT  26  /  AlkPhos  117<H>                    RADIOLOGY:    PHYSICAL EXAM:    GENERAL: NAD  HEENT:  Atraumatic, Normocephalic  PULMONARY: Clear to auscultation bilaterally; No wheeze  CARDIOVASCULAR: Regular rate and rhythm; No murmurs, rubs, or gallops  GASTROINTESTINAL: Soft, Nontender, Nondistended; Bowel sounds present  MUSCULOSKELETAL: No clubbing, cyanosis, or edema  NEUROLOGY: non-focal  SKIN: No rashes or lesions      ASSESSMENT & PLAN  70y M w/ hx of DMII, admitted due to seizure witnessed by wife. Pt admitted to ICU with DKA/HHS. Had seizures consistent with tonic-clonic seizure x2 in ED on 824. Pt was loaded with 3g Keppra and transferred to MICU where he was started on an insulin drip. He was subsequently intubated for airway protection and started on Versed gtt. Course was complicated by respiratory failure, pneumonia, encephalopathy, metabolic derangements. Tox screen was (_+) for benzos. Pt was extubated on . Pt was placed on VEEG which was negative for seizure. However, Vimpat was added to regime due to changes on EEG. Initial CTH/CTA was unremarkable. MRI on  revealed puncate acute infarcts involving L occipital cortex. CT Head on 9/3 revealed effusions consistent with mastoiditis.     # Altered Mental Status vs. Delirium - likely new onset   - per wife, pt is fully functional at baseline   - pt very combative, kicking roommate and staff, not allowing people to come near him (9/3)   - as per psych, pt has delirium  - per psych:  Haldol Standinmg @ 6am, 1mg @ 1pm, 2mg in the evening. Additional PRN: 2mg q8h;   - Daily ECG      #Status epilepticus- resolved   #AV dissociation  - veeg dcd   - Continue Keppra 1gram q8hrs  - Continue Vimpat 100mg q12hrs  - EP following, will need loop recorder and echo prior d/c  - echo ordered    #Small acute L occipital infarct  -C/w aspirin 81 mg daily   -C/w Statin  - added plavix and increased statin to 40mg on  as per neuro  - Consider ILR when stable   - f/u Lipid panel     # Non-specific Mastoid Effusion   - CT Head (9/3): Moderate left/mild right mastoid effusion; partial effusion in left middle ear cavity; possibly consistent with otomastoiditis    # HHS/DKA- resolved   # DM II - controlled    - Sliding scale q AC and HS  - lantus 20 units at night   - A1C 5.7     #Acute encephalopathy  - Continue neuro checks q 8 hours     # Pneumonia - likely from intubation; resolving   -C/w Vancomycin (last trough ; pt refusing additional troughs);  is day 5  - C/w aztreonam; last day today  - sputum  NG and blood clx negative  - duoneb and chest pt q 4     #Hypertension  - Lisinopril 10 mg PO daily started      #Urinary Retention  -Failed TOV x3; has lubin   -c/w cardura 4 mg daily   -attempt TOV when pt is no long combative/safer     #DVT ppx: lovenox  #GI ppx: not indicated  #Diet: regular  #Dispo: finish abx and TOV JOSÉ MANUEL PORTER 71y Male  MRN#: [id number]   CODE STATUS:________    SUBJECTIVE  Patient is a 71y old Male who presents with a chief complaint of AMS (05 Sep 2021 12:44)  Currently admitted to medicine with the primary diagnosis of Hyperglycemia     Today is hospital day 13d, and this morning he is resting comfortably and no overnight events    OBJECTIVE  PAST MEDICAL & SURGICAL HISTORY  DM (diabetes mellitus)  12/27/18 hgb aic  11.2    HTN (hypertension)    Dyslipidemia    Diabetic foot ulcer    Depression    GERD (gastroesophageal reflux disease)    Diabetes    Toe amputation status, right  (2008)    History of amputation of toe  LT -partial 1st toe      ALLERGIES:  No Known Allergies    MEDICATIONS:  STANDING MEDICATIONS  albuterol/ipratropium for Nebulization 3 milliLiter(s) Nebulizer every 6 hours  aspirin  chewable 81 milliGRAM(s) Oral daily  atorvastatin 40 milliGRAM(s) Oral at bedtime  aztreonam  IVPB 1000 milliGRAM(s) IV Intermittent every 8 hours  chlorhexidine 4% Liquid 1 Application(s) Topical <User Schedule>  clopidogrel Tablet 75 milliGRAM(s) Oral daily  doxazosin 4 milliGRAM(s) Oral at bedtime  enoxaparin Injectable 40 milliGRAM(s) SubCutaneous daily  insulin glargine Injectable (LANTUS) 20 Unit(s) SubCutaneous at bedtime  insulin lispro (ADMELOG) corrective regimen sliding scale   SubCutaneous Before meals and at bedtime  lacosamide IVPB 100 milliGRAM(s) IV Intermittent every 12 hours  levETIRAcetam  IVPB 1000 milliGRAM(s) IV Intermittent every 12 hours  lisinopril 10 milliGRAM(s) Oral daily  OLANZapine 5 milliGRAM(s) Oral at bedtime  pantoprazole   Suspension 40 milliGRAM(s) Oral daily  senna 2 Tablet(s) Oral at bedtime    PRN MEDICATIONS  acetaminophen   Tablet .. 650 milliGRAM(s) Oral every 6 hours PRN  haloperidol    Injectable 2 milliGRAM(s) IntraMuscular every 8 hours PRN  labetalol Injectable 10 milliGRAM(s) IV Push every 6 hours PRN      VITAL SIGNS: Last 24 Hours  T(C): 37.1 (06 Sep 2021 05:34), Max: 37.2 (05 Sep 2021 13:19)  T(F): 98.8 (06 Sep 2021 05:34), Max: 99 (05 Sep 2021 13:19)  HR: 88 (06 Sep 2021 05:34) (88 - 96)  BP: 156/72 (06 Sep 2021 05:34) (139/63 - 163/73)  BP(mean): 104 (06 Sep 2021 05:34) (90 - 105)  RR: 18 (05 Sep 2021 22:01) (18 - 20)  SpO2: 95% (05 Sep 2021 09:00) (95% - 95%)    LABS:                        9.0    6.02  )-----------( 312      ( 05 Sep 2021 06:43 )             28.8     09-05    146  |  109  |  12  ----------------------------<  174<H>  4.1   |  28  |  0.8    Ca    8.2<L>      05 Sep 2021 06:43  Mg     2.1     09-05    TPro  4.9<L>  /  Alb  2.8<L>  /  TBili  0.4  /  DBili  x   /  AST  20  /  ALT  26  /  AlkPhos  117<H>  09-05                  RADIOLOGY:    PHYSICAL EXAM:    GENERAL: NAD  HEENT:  Atraumatic, Normocephalic  PULMONARY: Clear to auscultation bilaterally; No wheeze  CARDIOVASCULAR: Regular rate and rhythm; No murmurs, rubs, or gallops  GASTROINTESTINAL: Soft, Nontender, Nondistended; Bowel sounds present  MUSCULOSKELETAL: No clubbing, cyanosis, or edema  NEUROLOGY: non-focal  SKIN: No rashes or lesions

## 2021-09-06 NOTE — PROGRESS NOTE ADULT - ASSESSMENT
71y Male with h/o DM, HTN, HLD, admitted with CVA and status epilepticus  intubated in the filed, extubated 8/31  AV dissociation noted on admission, resolved, likely due to hypoxia at the time of acute event  Intraseptal aneurysm noted on prior echocardiogram current study technically suboptimal  Being treated for PNA, last day of abx is today 9/6  Mental status improved    con't current management  consider repeating Echocardiogram  Patient is recommended for implantable loop recorder placement for long term cardiac monitoring  Risks and benefits of procedure discussed with patient at the bedside and daughter over the phone  they will decide tomorrow  repeat COVID PCR sent in case will agree for ILR tomorrow  will follow up    Daughter Amber Ceja [phone]

## 2021-09-06 NOTE — PROGRESS NOTE ADULT - SUBJECTIVE AND OBJECTIVE BOX
INTERVAL HPI/OVERNIGHT EVENTS:  patient is doing well   Mentla status improved  OOB to chair,   denies any complaints    MEDICATIONS  (STANDING):  albuterol/ipratropium for Nebulization 3 milliLiter(s) Nebulizer every 6 hours  aspirin  chewable 81 milliGRAM(s) Oral daily  atorvastatin 40 milliGRAM(s) Oral at bedtime  aztreonam  IVPB 1000 milliGRAM(s) IV Intermittent every 8 hours  chlorhexidine 4% Liquid 1 Application(s) Topical <User Schedule>  clopidogrel Tablet 75 milliGRAM(s) Oral daily  doxazosin 4 milliGRAM(s) Oral at bedtime  enoxaparin Injectable 40 milliGRAM(s) SubCutaneous daily  insulin glargine Injectable (LANTUS) 20 Unit(s) SubCutaneous at bedtime  insulin lispro (ADMELOG) corrective regimen sliding scale   SubCutaneous Before meals and at bedtime  lacosamide IVPB 100 milliGRAM(s) IV Intermittent every 12 hours  levETIRAcetam  IVPB 1000 milliGRAM(s) IV Intermittent every 12 hours  lisinopril 10 milliGRAM(s) Oral daily  OLANZapine 5 milliGRAM(s) Oral at bedtime  pantoprazole   Suspension 40 milliGRAM(s) Oral daily  senna 2 Tablet(s) Oral at bedtime    MEDICATIONS  (PRN):  acetaminophen   Tablet .. 650 milliGRAM(s) Oral every 6 hours PRN Temp greater or equal to 38C (100.4F), Mild Pain (1 - 3), Moderate Pain (4 - 6), Severe Pain (7 - 10)  haloperidol    Injectable 2 milliGRAM(s) IntraMuscular every 8 hours PRN agitation/combative/self destructive  labetalol Injectable 10 milliGRAM(s) IV Push every 6 hours PRN Systolic blood pressure >      Allergies    No Known Allergies    Intolerances        REVIEW OF SYSTEMS    [x ] A ten-point review of systems was otherwise negative except as noted.  [ ] Due to altered mental status/intubation, subjective information were not able to be obtained from the patient. History was obtained, to the extent possible, from review of the chart and collateral sources of information.      Vital Signs Last 24 Hrs  T(C): 36.5 (06 Sep 2021 07:35), Max: 37.2 (05 Sep 2021 13:19)  T(F): 97.7 (06 Sep 2021 07:35), Max: 99 (05 Sep 2021 13:19)  HR: 83 (06 Sep 2021 07:35) (83 - 96)  BP: 156/72 (06 Sep 2021 07:35) (139/63 - 163/73)  BP(mean): 103 (06 Sep 2021 07:35) (90 - 105)  RR: 18 (06 Sep 2021 07:35) (18 - 18)  SpO2: 100% (06 Sep 2021 07:35) (100% - 100%)    09-05-21 @ 07:01  -  09-06-21 @ 07:00  --------------------------------------------------------  IN: 150 mL / OUT: 1650 mL / NET: -1500 mL        PHYSICAL EXAM:    GENERAL: In no apparent distress, well nourished, and hydrated.  HEART: Regular rate and rhythm; No murmur; NO rubs, or gallops.  PULMONARY: Clear to auscultation and percussion.  Normal expansion/effort. No rales, wheezing, or rhonchi bilaterally.  ABDOMEN: Soft, Nontender, Nondistended; Bowel sounds present  EXTREMITIES:  Extremities warm, pink, well-perfused, 2+ Peripheral Pulses, No clubbing, cyanosis, or edema  NEUROLOGICAL: alert & oriented x 3, no focal deficits, PERRLA, EOMI    LABS:                        8.3    6.43  )-----------( 325      ( 06 Sep 2021 07:20 )             26.7     09-06    144  |  108  |  7<L>  ----------------------------<  166<H>  4.1   |  28  |  0.7    Ca    8.1<L>      06 Sep 2021 07:20  Mg     2.0     09-06    TPro  4.5<L>  /  Alb  2.7<L>  /  TBili  0.3  /  DBili  x   /  AST  17  /  ALT  20  /  AlkPhos  111  09-06        EKG NSR @72        RADIOLOGY & ADDITIONAL TESTS:

## 2021-09-06 NOTE — PROGRESS NOTE ADULT - ATTENDING COMMENTS
I have performed a history and physical exam of this patient and discussed the management with the resident. I have reviewed the resident note and agree with the documented findings and plan of care.    Patient will complete antibioric today  Monitor overnight and if remains stable  Prepare for discharge tomorrow.   PT eval  Consult case management for dispo planning I have performed a history and physical exam of this patient and discussed the management with the resident. I have reviewed the resident note and agree with the documented findings and plan of care.    Patient will complete antibiotic today  Monitor overnight and if remains stable  Prepare for discharge tomorrow.   PT eval  Consult case management for dispo planning

## 2021-09-06 NOTE — PROGRESS NOTE ADULT - ASSESSMENT
ASSESSMENT & PLAN  70y M w/ hx of DMII, admitted due to seizure witnessed by wife. Pt admitted to ICU with DKA/HHS. Had seizures consistent with tonic-clonic seizure x2 in ED on 824. Pt was loaded with 3g Keppra and transferred to MICU where he was started on an insulin drip. He was subsequently intubated for airway protection and started on Versed gtt. Course was complicated by respiratory failure, pneumonia, encephalopathy, metabolic derangements. Tox screen was (_+) for benzos. Pt was extubated on . Pt was placed on VEEG which was negative for seizure. However, Vimpat was added to regime due to changes on EEG. Initial CTH/CTA was unremarkable. MRI on  revealed puncate acute infarcts involving L occipital cortex. CT Head on 9/3 revealed effusions consistent with mastoiditis.     # Altered Mental Status vs. Delirium - likely new onset   - per wife, pt is fully functional at baseline   - pt very combative, kicking roommate and staff, not allowing people to come near him (9/3)   - as per psych, pt has delirium  - per psych:  Haldol Standinmg @ 6am, 1mg @ 1pm, 2mg in the evening. Additional PRN: 2mg q8h;   - Daily ECG      #Status epilepticus- resolved   #AV dissociation  - veeg dcd   - Continue Keppra 1gram q8hrs  - Continue Vimpat 100mg q12hrs  - EP following, will need loop recorder and echo prior d/c  - echo ordered    #Small acute L occipital infarct  -C/w aspirin 81 mg daily   -C/w Statin  - added plavix and increased statin to 40mg on  as per neuro  - Consider ILR when stable   - f/u Lipid panel     # Non-specific Mastoid Effusion   - CT Head (9/3): Moderate left/mild right mastoid effusion; partial effusion in left middle ear cavity; possibly consistent with otomastoiditis    # HHS/DKA- resolved   # DM II - controlled    - Sliding scale q AC and HS  - lantus 20 units at night   - A1C 5.7     #Acute encephalopathy  - Continue neuro checks q 8 hours     # Pneumonia - likely from intubation; resolving   -C/w Vancomycin (last trough ; pt refusing additional troughs); 9/4 is day 5  - C/w aztreonam; last day today  - sputum  NG and blood clx negative  - duoneb and chest pt q 4     #Hypertension  - Lisinopril 10 mg PO daily started      #Urinary Retention  -Failed TOV x3; has lubin   -c/w cardura 4 mg daily   -attempt TOV when pt is no long combative/safer     #DVT ppx: lovenox  #GI ppx: not indicated  #Diet: regular  #Dispo: finish abx and TOV

## 2021-09-07 LAB
ALBUMIN SERPL ELPH-MCNC: 3 G/DL — LOW (ref 3.5–5.2)
ALP SERPL-CCNC: 116 U/L — HIGH (ref 30–115)
ALT FLD-CCNC: 25 U/L — SIGNIFICANT CHANGE UP (ref 0–41)
ANION GAP SERPL CALC-SCNC: 11 MMOL/L — SIGNIFICANT CHANGE UP (ref 7–14)
AST SERPL-CCNC: 26 U/L — SIGNIFICANT CHANGE UP (ref 0–41)
BASOPHILS # BLD AUTO: 0.03 K/UL — SIGNIFICANT CHANGE UP (ref 0–0.2)
BASOPHILS NFR BLD AUTO: 0.5 % — SIGNIFICANT CHANGE UP (ref 0–1)
BILIRUB SERPL-MCNC: 0.3 MG/DL — SIGNIFICANT CHANGE UP (ref 0.2–1.2)
BUN SERPL-MCNC: 7 MG/DL — LOW (ref 10–20)
CALCIUM SERPL-MCNC: 8.2 MG/DL — LOW (ref 8.5–10.1)
CHLORIDE SERPL-SCNC: 108 MMOL/L — SIGNIFICANT CHANGE UP (ref 98–110)
CO2 SERPL-SCNC: 24 MMOL/L — SIGNIFICANT CHANGE UP (ref 17–32)
CREAT SERPL-MCNC: 0.8 MG/DL — SIGNIFICANT CHANGE UP (ref 0.7–1.5)
EOSINOPHIL # BLD AUTO: 0.09 K/UL — SIGNIFICANT CHANGE UP (ref 0–0.7)
EOSINOPHIL NFR BLD AUTO: 1.6 % — SIGNIFICANT CHANGE UP (ref 0–8)
GLUCOSE BLDC GLUCOMTR-MCNC: 117 MG/DL — HIGH (ref 70–99)
GLUCOSE BLDC GLUCOMTR-MCNC: 193 MG/DL — HIGH (ref 70–99)
GLUCOSE BLDC GLUCOMTR-MCNC: 230 MG/DL — HIGH (ref 70–99)
GLUCOSE BLDC GLUCOMTR-MCNC: 303 MG/DL — HIGH (ref 70–99)
GLUCOSE SERPL-MCNC: 122 MG/DL — HIGH (ref 70–99)
HCT VFR BLD CALC: 30.4 % — LOW (ref 42–52)
HGB BLD-MCNC: 9.5 G/DL — LOW (ref 14–18)
IMM GRANULOCYTES NFR BLD AUTO: 3.9 % — HIGH (ref 0.1–0.3)
LYMPHOCYTES # BLD AUTO: 0.63 K/UL — LOW (ref 1.2–3.4)
LYMPHOCYTES # BLD AUTO: 11.2 % — LOW (ref 20.5–51.1)
MAGNESIUM SERPL-MCNC: 1.9 MG/DL — SIGNIFICANT CHANGE UP (ref 1.8–2.4)
MCHC RBC-ENTMCNC: 29 PG — SIGNIFICANT CHANGE UP (ref 27–31)
MCHC RBC-ENTMCNC: 31.3 G/DL — LOW (ref 32–37)
MCV RBC AUTO: 92.7 FL — SIGNIFICANT CHANGE UP (ref 80–94)
MONOCYTES # BLD AUTO: 0.53 K/UL — SIGNIFICANT CHANGE UP (ref 0.1–0.6)
MONOCYTES NFR BLD AUTO: 9.4 % — HIGH (ref 1.7–9.3)
NEUTROPHILS # BLD AUTO: 4.12 K/UL — SIGNIFICANT CHANGE UP (ref 1.4–6.5)
NEUTROPHILS NFR BLD AUTO: 73.4 % — SIGNIFICANT CHANGE UP (ref 42.2–75.2)
NRBC # BLD: 0 /100 WBCS — SIGNIFICANT CHANGE UP (ref 0–0)
PLATELET # BLD AUTO: 325 K/UL — SIGNIFICANT CHANGE UP (ref 130–400)
POTASSIUM SERPL-MCNC: 4.2 MMOL/L — SIGNIFICANT CHANGE UP (ref 3.5–5)
POTASSIUM SERPL-SCNC: 4.2 MMOL/L — SIGNIFICANT CHANGE UP (ref 3.5–5)
PROT SERPL-MCNC: 5.2 G/DL — LOW (ref 6–8)
RBC # BLD: 3.28 M/UL — LOW (ref 4.7–6.1)
RBC # FLD: 13.1 % — SIGNIFICANT CHANGE UP (ref 11.5–14.5)
SODIUM SERPL-SCNC: 143 MMOL/L — SIGNIFICANT CHANGE UP (ref 135–146)
WBC # BLD: 5.62 K/UL — SIGNIFICANT CHANGE UP (ref 4.8–10.8)
WBC # FLD AUTO: 5.62 K/UL — SIGNIFICANT CHANGE UP (ref 4.8–10.8)

## 2021-09-07 PROCEDURE — 99232 SBSQ HOSP IP/OBS MODERATE 35: CPT

## 2021-09-07 PROCEDURE — 93306 TTE W/DOPPLER COMPLETE: CPT | Mod: 26

## 2021-09-07 RX ADMIN — LEVETIRACETAM 400 MILLIGRAM(S): 250 TABLET, FILM COATED ORAL at 05:08

## 2021-09-07 RX ADMIN — INSULIN GLARGINE 20 UNIT(S): 100 INJECTION, SOLUTION SUBCUTANEOUS at 21:37

## 2021-09-07 RX ADMIN — SENNA PLUS 2 TABLET(S): 8.6 TABLET ORAL at 21:40

## 2021-09-07 RX ADMIN — CHLORHEXIDINE GLUCONATE 1 APPLICATION(S): 213 SOLUTION TOPICAL at 05:08

## 2021-09-07 RX ADMIN — Medication 8: at 17:37

## 2021-09-07 RX ADMIN — LISINOPRIL 10 MILLIGRAM(S): 2.5 TABLET ORAL at 05:09

## 2021-09-07 RX ADMIN — CLOPIDOGREL BISULFATE 75 MILLIGRAM(S): 75 TABLET, FILM COATED ORAL at 12:58

## 2021-09-07 RX ADMIN — Medication 3 MILLILITER(S): at 09:08

## 2021-09-07 RX ADMIN — Medication 2: at 21:37

## 2021-09-07 RX ADMIN — OLANZAPINE 5 MILLIGRAM(S): 15 TABLET, FILM COATED ORAL at 21:39

## 2021-09-07 RX ADMIN — LACOSAMIDE 120 MILLIGRAM(S): 50 TABLET ORAL at 06:18

## 2021-09-07 RX ADMIN — ATORVASTATIN CALCIUM 40 MILLIGRAM(S): 80 TABLET, FILM COATED ORAL at 21:39

## 2021-09-07 RX ADMIN — LEVETIRACETAM 400 MILLIGRAM(S): 250 TABLET, FILM COATED ORAL at 17:35

## 2021-09-07 RX ADMIN — Medication 50 MILLIGRAM(S): at 05:09

## 2021-09-07 RX ADMIN — ENOXAPARIN SODIUM 40 MILLIGRAM(S): 100 INJECTION SUBCUTANEOUS at 12:58

## 2021-09-07 RX ADMIN — LACOSAMIDE 120 MILLIGRAM(S): 50 TABLET ORAL at 18:16

## 2021-09-07 RX ADMIN — Medication 4 MILLIGRAM(S): at 21:39

## 2021-09-07 RX ADMIN — Medication 81 MILLIGRAM(S): at 12:58

## 2021-09-07 NOTE — CHART NOTE - NSCHARTNOTEFT_GEN_A_CORE
71y Male with h/o DM, HTN, HLD, admitted with CVA and status epilepticus, intubated in the filed, extubated 8/31. AV dissociation noted on admission, resolved, likely due to hypoxia at the time of acute event. Intraseptal aneurysm noted on prior echocardiogram current study technically suboptimal  Being treated for PNA, last day of abx is today 9/6    Plan:  - Loop tomorrow  - Does not have to be NPO  - HOLD lovenox in AM until after procedure  - COVID expires 9/9    Plan discussed with daughter at bedside with patient  EPS [id number]

## 2021-09-07 NOTE — PROGRESS NOTE ADULT - SUBJECTIVE AND OBJECTIVE BOX
JOSÉ MANUEL PORTER 71y Male  MRN#: [id number]   CODE STATUS:________    SUBJECTIVE  Patient is a 71y old Male who presents with a chief complaint of AMS (06 Sep 2021 10:57)  Currently admitted to medicine with the primary diagnosis of Hyperglycemia     Today is hospital day 14d, and this morning he is resting comfortably and reports no overnight events.       OBJECTIVE  PAST MEDICAL & SURGICAL HISTORY  DM (diabetes mellitus)  18 hgb aic  11.2    HTN (hypertension)    Dyslipidemia    Diabetic foot ulcer    Depression    GERD (gastroesophageal reflux disease)    Diabetes    Toe amputation status, right  ()    History of amputation of toe  LT -partial 1st toe      ALLERGIES:  No Known Allergies    MEDICATIONS:  STANDING MEDICATIONS  albuterol/ipratropium for Nebulization 3 milliLiter(s) Nebulizer every 6 hours  aspirin  chewable 81 milliGRAM(s) Oral daily  atorvastatin 40 milliGRAM(s) Oral at bedtime  chlorhexidine 4% Liquid 1 Application(s) Topical <User Schedule>  clopidogrel Tablet 75 milliGRAM(s) Oral daily  doxazosin 4 milliGRAM(s) Oral at bedtime  enoxaparin Injectable 40 milliGRAM(s) SubCutaneous daily  insulin glargine Injectable (LANTUS) 20 Unit(s) SubCutaneous at bedtime  insulin lispro (ADMELOG) corrective regimen sliding scale   SubCutaneous Before meals and at bedtime  lacosamide IVPB 100 milliGRAM(s) IV Intermittent every 12 hours  levETIRAcetam  IVPB 1000 milliGRAM(s) IV Intermittent every 12 hours  lisinopril 10 milliGRAM(s) Oral daily  OLANZapine 5 milliGRAM(s) Oral at bedtime  pantoprazole   Suspension 40 milliGRAM(s) Oral daily  senna 2 Tablet(s) Oral at bedtime    PRN MEDICATIONS  acetaminophen   Tablet .. 650 milliGRAM(s) Oral every 6 hours PRN  haloperidol    Injectable 2 milliGRAM(s) IntraMuscular every 8 hours PRN  labetalol Injectable 10 milliGRAM(s) IV Push every 6 hours PRN      VITAL SIGNS: Last 24 Hours  T(C): 37.1 (07 Sep 2021 04:41), Max: 37.3 (06 Sep 2021 13:17)  T(F): 98.8 (07 Sep 2021 04:41), Max: 99.1 (06 Sep 2021 13:17)  HR: 81 (07 Sep 2021 04:41) (81 - 86)  BP: 124/57 (07 Sep 2021 04:41) (124/57 - 171/77)  BP(mean): --  RR: 18 (07 Sep 2021 04:41) (18 - 18)  SpO2: --    LABS:                        8.3    6.43  )-----------( 325      ( 06 Sep 2021 07:20 )             26.7     09-    144  |  108  |  7<L>  ----------------------------<  166<H>  4.1   |  28  |  0.7    Ca    8.1<L>      06 Sep 2021 07:20  Mg     2.0         TPro  4.5<L>  /  Alb  2.7<L>  /  TBili  0.3  /  DBili  x   /  AST  17  /  ALT  20  /  AlkPhos  111  -06                  RADIOLOGY:    PHYSICAL EXAM:    GENERAL: NAD  HEENT:  Atraumatic, Normocephalic  PULMONARY: Clear to auscultation bilaterally; No wheeze  CARDIOVASCULAR: Regular rate and rhythm; No murmurs, rubs, or gallops  GASTROINTESTINAL: Soft, Nontender, Nondistended; Bowel sounds present  MUSCULOSKELETAL: No clubbing, cyanosis, or edema  NEUROLOGY: non-focal  SKIN: No rashes or lesions      ASSESSMENT & PLAN  70y M w/ hx of DMII, admitted due to seizure witnessed by wife. Pt admitted to ICU with DKA/HHS. Had seizures consistent with tonic-clonic seizure x2 in ED on 824. Pt was loaded with 3g Keppra and transferred to MICU where he was started on an insulin drip. He was subsequently intubated for airway protection and started on Versed gtt. Course was complicated by respiratory failure, pneumonia, encephalopathy, metabolic derangements. Tox screen was (_+) for benzos. Pt was extubated on . Pt was placed on VEEG which was negative for seizure. However, Vimpat was added to regime due to changes on EEG. Initial CTH/CTA was unremarkable. MRI on  revealed puncate acute infarcts involving L occipital cortex. CT Head on 9/3 revealed effusions consistent with mastoiditis.     # Altered Mental Status vs. Delirium - likely new onset   - per wife, pt is fully functional at baseline   - pt very combative, kicking roommate and staff, not allowing people to come near him (9/3)   - as per psych, pt has delirium  - per psych:  Haldol Standinmg @ 6am, 1mg @ 1pm, 2mg in the evening. Additional PRN: 2mg q8h;   - Daily ECG      #Status epilepticus- resolved   #AV dissociation  - veeg dcd   - Continue Keppra 1gram q8hrs  - Continue Vimpat 100mg q12hrs  - EP following, will need loop recorder and echo prior d/c, possible ILR today if agreeable   - echo ordered    #Small acute L occipital infarct  -C/w aspirin 81 mg daily   -C/w Statin  -added plavix and increased statin to 40mg on  as per neuro    # Non-specific Mastoid Effusion   - CT Head (9/3): Moderate left/mild right mastoid effusion; partial effusion in left middle ear cavity; possibly consistent with otomastoiditis    # HHS/DKA- resolved   # DM II - controlled    - Sliding scale q AC and HS  - lantus 20 units at night   - A1C 5.7     #Acute encephalopathy  - Continue neuro checks q 8 hours     # Pneumonia - likely from intubation; resolving   -C/w Vancomycin (last trough ; pt refusing additional troughs);  is day 5  -finished aztreonam;  - sputum  NG and blood clx negative  - duoneb and chest pt q 4     #Hypertension  - Lisinopril 10 mg PO daily started      #Urinary Retention  -Failed TOV x3; has lubin   -c/w cardura 4 mg daily   -attempt TOV when pt is no long combative/safer     #DVT ppx: lovenox  #GI ppx: not indicated  #Diet: regular  #Dispo: TOV and ILR today possibly

## 2021-09-07 NOTE — SWALLOW BEDSIDE ASSESSMENT ADULT - SWALLOW EVAL: RECOMMENDED FEEDING/EATING TECHNIQUES
crush medication (when feasible)/position upright (90 degrees)/small sips/bites
oral hygiene/position upright (90 degrees)
crush medication (when feasible)

## 2021-09-08 LAB
ALBUMIN SERPL ELPH-MCNC: 2.8 G/DL — LOW (ref 3.5–5.2)
ALP SERPL-CCNC: 133 U/L — HIGH (ref 30–115)
ALT FLD-CCNC: 31 U/L — SIGNIFICANT CHANGE UP (ref 0–41)
ANION GAP SERPL CALC-SCNC: 7 MMOL/L — SIGNIFICANT CHANGE UP (ref 7–14)
AST SERPL-CCNC: 35 U/L — SIGNIFICANT CHANGE UP (ref 0–41)
BASOPHILS # BLD AUTO: 0.03 K/UL — SIGNIFICANT CHANGE UP (ref 0–0.2)
BASOPHILS NFR BLD AUTO: 0.4 % — SIGNIFICANT CHANGE UP (ref 0–1)
BILIRUB SERPL-MCNC: 0.3 MG/DL — SIGNIFICANT CHANGE UP (ref 0.2–1.2)
BUN SERPL-MCNC: 6 MG/DL — LOW (ref 10–20)
CALCIUM SERPL-MCNC: 8.3 MG/DL — LOW (ref 8.5–10.1)
CHLORIDE SERPL-SCNC: 109 MMOL/L — SIGNIFICANT CHANGE UP (ref 98–110)
CO2 SERPL-SCNC: 26 MMOL/L — SIGNIFICANT CHANGE UP (ref 17–32)
CREAT SERPL-MCNC: 0.7 MG/DL — SIGNIFICANT CHANGE UP (ref 0.7–1.5)
EOSINOPHIL # BLD AUTO: 0.09 K/UL — SIGNIFICANT CHANGE UP (ref 0–0.7)
EOSINOPHIL NFR BLD AUTO: 1.3 % — SIGNIFICANT CHANGE UP (ref 0–8)
GLUCOSE BLDC GLUCOMTR-MCNC: 183 MG/DL — HIGH (ref 70–99)
GLUCOSE BLDC GLUCOMTR-MCNC: 222 MG/DL — HIGH (ref 70–99)
GLUCOSE BLDC GLUCOMTR-MCNC: 229 MG/DL — HIGH (ref 70–99)
GLUCOSE BLDC GLUCOMTR-MCNC: 233 MG/DL — HIGH (ref 70–99)
GLUCOSE SERPL-MCNC: 207 MG/DL — HIGH (ref 70–99)
HCT VFR BLD CALC: 28.4 % — LOW (ref 42–52)
HGB BLD-MCNC: 8.8 G/DL — LOW (ref 14–18)
IMM GRANULOCYTES NFR BLD AUTO: 3.9 % — HIGH (ref 0.1–0.3)
LYMPHOCYTES # BLD AUTO: 0.8 K/UL — LOW (ref 1.2–3.4)
LYMPHOCYTES # BLD AUTO: 11.3 % — LOW (ref 20.5–51.1)
MAGNESIUM SERPL-MCNC: 1.8 MG/DL — SIGNIFICANT CHANGE UP (ref 1.8–2.4)
MCHC RBC-ENTMCNC: 28.5 PG — SIGNIFICANT CHANGE UP (ref 27–31)
MCHC RBC-ENTMCNC: 31 G/DL — LOW (ref 32–37)
MCV RBC AUTO: 91.9 FL — SIGNIFICANT CHANGE UP (ref 80–94)
MONOCYTES # BLD AUTO: 0.59 K/UL — SIGNIFICANT CHANGE UP (ref 0.1–0.6)
MONOCYTES NFR BLD AUTO: 8.3 % — SIGNIFICANT CHANGE UP (ref 1.7–9.3)
NEUTROPHILS # BLD AUTO: 5.3 K/UL — SIGNIFICANT CHANGE UP (ref 1.4–6.5)
NEUTROPHILS NFR BLD AUTO: 74.8 % — SIGNIFICANT CHANGE UP (ref 42.2–75.2)
NRBC # BLD: 0 /100 WBCS — SIGNIFICANT CHANGE UP (ref 0–0)
PLATELET # BLD AUTO: 356 K/UL — SIGNIFICANT CHANGE UP (ref 130–400)
POTASSIUM SERPL-MCNC: 4.1 MMOL/L — SIGNIFICANT CHANGE UP (ref 3.5–5)
POTASSIUM SERPL-SCNC: 4.1 MMOL/L — SIGNIFICANT CHANGE UP (ref 3.5–5)
PROT SERPL-MCNC: 4.9 G/DL — LOW (ref 6–8)
RBC # BLD: 3.09 M/UL — LOW (ref 4.7–6.1)
RBC # FLD: 13.2 % — SIGNIFICANT CHANGE UP (ref 11.5–14.5)
SODIUM SERPL-SCNC: 142 MMOL/L — SIGNIFICANT CHANGE UP (ref 135–146)
WBC # BLD: 7.09 K/UL — SIGNIFICANT CHANGE UP (ref 4.8–10.8)
WBC # FLD AUTO: 7.09 K/UL — SIGNIFICANT CHANGE UP (ref 4.8–10.8)

## 2021-09-08 PROCEDURE — 33285 INSJ SUBQ CAR RHYTHM MNTR: CPT

## 2021-09-08 PROCEDURE — 99232 SBSQ HOSP IP/OBS MODERATE 35: CPT

## 2021-09-08 RX ORDER — CEPHALEXIN 500 MG
500 CAPSULE ORAL ONCE
Refills: 0 | Status: COMPLETED | OUTPATIENT
Start: 2021-09-08 | End: 2021-09-08

## 2021-09-08 RX ADMIN — Medication 3 MILLILITER(S): at 13:26

## 2021-09-08 RX ADMIN — Medication 4: at 17:22

## 2021-09-08 RX ADMIN — Medication 3 MILLILITER(S): at 21:17

## 2021-09-08 RX ADMIN — OLANZAPINE 5 MILLIGRAM(S): 15 TABLET, FILM COATED ORAL at 21:23

## 2021-09-08 RX ADMIN — Medication 81 MILLIGRAM(S): at 12:06

## 2021-09-08 RX ADMIN — Medication 4 MILLIGRAM(S): at 21:24

## 2021-09-08 RX ADMIN — LISINOPRIL 10 MILLIGRAM(S): 2.5 TABLET ORAL at 05:58

## 2021-09-08 RX ADMIN — LACOSAMIDE 120 MILLIGRAM(S): 50 TABLET ORAL at 18:33

## 2021-09-08 RX ADMIN — CLOPIDOGREL BISULFATE 75 MILLIGRAM(S): 75 TABLET, FILM COATED ORAL at 12:06

## 2021-09-08 RX ADMIN — Medication 2: at 08:21

## 2021-09-08 RX ADMIN — SENNA PLUS 2 TABLET(S): 8.6 TABLET ORAL at 21:23

## 2021-09-08 RX ADMIN — ATORVASTATIN CALCIUM 40 MILLIGRAM(S): 80 TABLET, FILM COATED ORAL at 21:24

## 2021-09-08 RX ADMIN — LACOSAMIDE 120 MILLIGRAM(S): 50 TABLET ORAL at 05:58

## 2021-09-08 RX ADMIN — INSULIN GLARGINE 20 UNIT(S): 100 INJECTION, SOLUTION SUBCUTANEOUS at 21:24

## 2021-09-08 RX ADMIN — LEVETIRACETAM 400 MILLIGRAM(S): 250 TABLET, FILM COATED ORAL at 17:22

## 2021-09-08 RX ADMIN — LEVETIRACETAM 400 MILLIGRAM(S): 250 TABLET, FILM COATED ORAL at 05:53

## 2021-09-08 RX ADMIN — Medication 4: at 12:04

## 2021-09-08 RX ADMIN — PANTOPRAZOLE SODIUM 40 MILLIGRAM(S): 20 TABLET, DELAYED RELEASE ORAL at 12:07

## 2021-09-08 RX ADMIN — Medication 500 MILLIGRAM(S): at 09:34

## 2021-09-08 NOTE — SWALLOW BEDSIDE ASSESSMENT ADULT - SWALLOW EVAL: CURRENT DIET
Dysphagia 2 soft ground consistency w/ thin liquids
NPO w/ NGT
mechanical soft w/ thin liquids per MD orders
dys 2 w/ thin

## 2021-09-08 NOTE — PROGRESS NOTE ADULT - ASSESSMENT
70y M w/ hx of DMII, admitted due to seizure witnessed by wife. Pt admitted to ICU with DKA/HHS. Had seizures consistent with tonic-clonic seizure x2 in ED on 824. Pt was loaded with 3g Keppra and transferred to MICU where he was started on an insulin drip. He was subsequently intubated for airway protection and started on Versed gtt. Course was complicated by respiratory failure, pneumonia, encephalopathy, metabolic derangements. Tox screen was (_+) for benzos. Pt was extubated on . Pt was placed on VEEG which was negative for seizure. However, Vimpat was added to regime due to changes on EEG. Initial CTH/CTA was unremarkable. MRI on  revealed puncate acute infarcts involving L occipital cortex. CT Head on 9/3 revealed effusions consistent with mastoiditis.     # Altered Mental Status vs. Delirium - resolved ()  - per wife, pt is fully functional at baseline   - pt very combative, kicking roommate and staff, not allowing people to come near him (9/3)   - as per psych, pt has delirium  - s/p Haldol Standinmg @ 6am, 1mg @ 1pm, 2mg in the evening. Additional PRN: 2mg q8h;   - c/w olanzapine 5 mg PO at bedtime    #Status epilepticus- resolved   #AV dissociation  - veeg dcd   - Continue Keppra 1gram q8hrs  - Continue Vimpat 100mg q12hrs  - loop recorder placed today, f/u with EP outpt in 3-4 weeks  - echo WNL, mildly enlarged LA    #Small acute L occipital infarct  -C/w aspirin 81 mg daily   -C/w Statin  -added plavix and increased statin to 40mg on  as per neuro    # Non-specific Mastoid Effusion   - CT Head (9/3): Moderate left/mild right mastoid effusion; partial effusion in left middle ear cavity; possibly consistent with otomastoiditis    # HHS/DKA- resolved   # DM II - controlled    - Sliding scale q AC and HS  - lantus 20 units at night   - A1C 5.7     #Acute encephalopathy  - mental status improved    # Pneumonia - likely from intubation; resolving   - s/p vancomycin/zosyn  --> vancomycin cefepime - --> vancomycin/aztreoname -  -finished aztreonam ;  - sputum  NG and blood clx negative  - duoneb and chest pt q 4     #Hypertension  - Lisinopril 10 mg PO daily started      #Urinary Retention  -Failed TOV x3; has lubin   -c/w cardura 4 mg daily   -attempt TOV after PT sees him    #DVT ppx: lovenox  #GI ppx: not indicated  #Diet: dysphagia 3  #Dispo: pending PT eval and TOV for 4A

## 2021-09-08 NOTE — SWALLOW BEDSIDE ASSESSMENT ADULT - SWALLOW EVAL: DIAGNOSIS
improved toleration of soft solids, puree, and thin liquids w/o overt s/s aspiration/penetration
tolerated given trials, limited dentition
+overt s/s aspiration/penetration for thin and nectar-thick liquids; +min overt s/s aspiration/penetration w/ puree trials.
+toleration of thin liquids and mech soft solids w/o overt s/s of penetration/aspiration

## 2021-09-08 NOTE — PROGRESS NOTE ADULT - SUBJECTIVE AND OBJECTIVE BOX
Electrophysiology Brief Post-Op Note    I have personally seen and examined the patient.  I agree with the history and physical which I have reviewed and noted any changes below.  09-08-21 @ 09:49    PRE-OP DIAGNOSIS: Cryptogenic CVA    POST-OP DIAGNOSIS:Cryptogenic CVA    PROCEDURE: Loop Implant    Physician: Dr. Rocha  Assistant:  SANTOS Paris    ESTIMATED BLOOD LOSS:  2    mL    ANESTHESIA TYPE:  [  ]General Anesthesia  [  ] Sedation  [X  ] Local/Regional    CONDITION  [  ] Critical  [  ] Serious  [  ]Fair  [ X ]Good    SPECIMENS REMOVED (IF APPLICABLE):  none    IMPLANTS (IF APPLICABLE)  Loop Recorder (Medtronic)    FINDINGS  PLAN OF CARE  - F/U 3-4 weeks  - May remove bandaid tomorrow  - May shower in 48 hours

## 2021-09-08 NOTE — SWALLOW BEDSIDE ASSESSMENT ADULT - ASR SWALLOW DENTITION
bridge not at bedside, pt reports ill fitting, waiting to have dental appointment as an outpt/incomplete
uses dentures to eat/incomplete

## 2021-09-08 NOTE — SWALLOW BEDSIDE ASSESSMENT ADULT - SLP PERTINENT HISTORY OF CURRENT PROBLEM
71-year-old diabetic, admitted with HHS/DKA s/p clinical tonic-clonic seizure x2 qualifying for status epilepticus on keppra 3g/d and versed gtt, course c/b respiratory failure, distributive shock, encephalopathy, and metabolic derangements; Status epilepticus, likely due to osmotic shift in setting of hyperglycemia. pt s/p extubation earlier today 8/31. MRI 8/26-> Punctate acute infarct involving the left occipital cortex.  Pt s/p loop recorder placement this AM. 71-year-old diabetic, admitted with HHS/DKA s/p clinical tonic-clonic seizure x2 qualifying for status epilepticus on keppra 3g/d and versed gtt, course c/b respiratory failure, distributive shock, encephalopathy, and metabolic derangements; Status epilepticus, likely due to osmotic shift in setting of hyperglycemia. pt s/p extubation 8/31. MRI 8/26-> Punctate acute infarct involving the left occipital cortex.  Pt s/p loop recorder placement this AM.

## 2021-09-08 NOTE — SWALLOW BEDSIDE ASSESSMENT ADULT - SWALLOW EVAL: RECOMMENDED DIET
dysphagia 2 w/ thin liquids
Dysphagia 3 soft cut up consistency w/ thin liquids
dys 2 w/ thin
NPO X essential meds w/ apple sauce

## 2021-09-08 NOTE — SPEECH LANGUAGE PATHOLOGY EVALUATION - YES/NO QUESTIONS: CONCRETE
Push fluids  Return to the emergency department for new or worsening symptoms: Difficulty breathing, fever, not urinating normally, decreased urination or vomiting, or any other concerns  Please follow-up with pediatrician, you may use the provider above, or the patient connection for assistance with finding a primary care provider  Complete entire course of antibiotics     yes

## 2021-09-08 NOTE — SWALLOW BEDSIDE ASSESSMENT ADULT - NS ASR SWALLOW FINDINGS DISCUS
AMPARO [facility] [id number]/Physician/Nursing/Patient/Family
MD Maharaj, RN Cat aware/Physician/Nursing
Nursing/Patient
RN Shea TORRE [phone]/Physician/Nursing/Patient/Family

## 2021-09-08 NOTE — PROGRESS NOTE ADULT - ATTENDING COMMENTS
Patient seen and examined at bedside   no acute overnight events noted   comfortably sitting in chair     Plan:   1. encephalopathy - delirium - resolved   2. pneumonia - aspiration vs VAP    3. acute L occipital infarct   4. DMII - HHS/DKA  5. status epilepticus   6. HTN   7. urinary retention    - pt is fully functional at baseline - mental status at baseline this morning - AAOx3 on exam   - pt very combative, kicking roommate and staff, not allowing people to come near him (9/3)   - now back to baseline - s/p haldol standing and prn   - completed course of antibiotics for pneumonia   - sputum 8/31 (+) gram neg rods and gram positive cocci   - ID recs noted   - continue aspirin/plavix and atorvastatin   - loop recorder placed by EP today   - basal bolus insulin regimen   - A1c of 5.7  - continue keppra and vimpat   - continue lisinopril and cardura   - TOV prior to discharge   - OOB  - lovenox subq for DVT proph .    Dispo: DC planning to 4A rehab

## 2021-09-08 NOTE — SPEECH LANGUAGE PATHOLOGY EVALUATION - SLP DIAGNOSIS
mild receptive & expressive deficits related to cognitive impairment (including short term memory, mental manipulation, organization/sequencing deficits)

## 2021-09-08 NOTE — SWALLOW BEDSIDE ASSESSMENT ADULT - SWALLOW EVAL: FUNCTIONAL LEVEL AT TIME OF EVAL
awake, alert, hoarse vocal quality, s/p loop recorder placement this AM
pt was being taken off unit for testing therefore unable to assess for po diet upgrade

## 2021-09-08 NOTE — SWALLOW BEDSIDE ASSESSMENT ADULT - ORAL PREPARATORY PHASE
Within functional limits
Within functional limits
limited mastication ability 2' incomplete dentition
Within functional limits

## 2021-09-08 NOTE — CHART NOTE - NSCHARTNOTEFT_GEN_A_CORE
EP PROCEDURE NOTE    Date of Procedure: 09-08-21  EP Attending: Dr. Rocha  Assistant: SANTOS Paris  Referring Physician: Dr. Chauhan  Procedure: Loop Recorder Implant    Indication: 71y Male with history of CVA referred for implantable loop recorder.     Anesthesia: Local    EQUIPMENT IMPLANTED  : Medtronic Linq  Model Number: [id number]  Serial Number: [id number]    PROCEDURE DESCRIPTION  The patient was brought to the electrophysiology procedure area in a non-sedated state and received preoperative antibiotics. Informed, written consent was obtained prior to the procedure. The left anterior chest region was prepped and cleaned from the nipple to the upper left clavicular border with serial applications of Chlorhexidine. Patient was then covered with sterile drapes in the usual manner. Blood pressure, oxygenation, and level of comfort were stable throughout.     Following infiltration with local anesthetic, a 1-cm incision was made along the left sternal border at the fourth intercostal space. Using the tunneling device the implantable loop recorder was inserted into the subcutaneous tissue. Direct pressure was applied to the wound to obtain hemostasis. The wound was approximated with Dermabond. Steri-strips and a dry, sterile dressing were placed over the wound. R waves were noted to be 0.48 mV.     The patient tolerated the procedure well.     COMPLICATIONS  No immediate complications    CONCLUSIONS  Successful loop recorder implant    EBL: 2 cc

## 2021-09-08 NOTE — SPEECH LANGUAGE PATHOLOGY EVALUATION - CONFRONTATIONAL NAMING
Problem: Falls - Risk of:  Goal: Will remain free from falls  Description: Will remain free from falls  Outcome: Ongoing  Goal: Absence of physical injury  Description: Absence of physical injury  Outcome: Ongoing     Problem: Tobacco Use:  Goal: Inpatient tobacco use cessation counseling participation  Description: Inpatient tobacco use cessation counseling participation  Outcome: Ongoing     Problem: Altered Mood, Psychotic Behavior:  Goal: Able to demonstrate trust by eating, participating in treatment and following staff's direction  Description: Able to demonstrate trust by eating, participating in treatment and following staff's direction  Outcome: Ongoing  Goal: Able to verbalize reality based thinking  Description: Able to verbalize reality based thinking  Outcome: Ongoing  Goal: Compliance with prescribed medication regimen will improve  Description: Compliance with prescribed medication regimen will improve  Outcome: Ongoing     Problem: Substance Abuse:  Goal: Absence of drug withdrawal signs and symptoms  Description: Absence of drug withdrawal signs and symptoms  Outcome: Ongoing  Goal: Participates in care planning  Description: Participates in care planning  Outcome: Ongoing  Goal: Patient specific goal  Description: Patient specific goal  Outcome: Ongoing intact

## 2021-09-08 NOTE — CHART NOTE - NSCHARTNOTESELECT_GEN_ALL_CORE
EP/Event Note
Transfer Note
Calorie Count/Event Note
EPS
Event Note
Nutrition Support/Nutrition Services
Nutrition Support/Nutrition Services

## 2021-09-08 NOTE — PROGRESS NOTE ADULT - SUBJECTIVE AND OBJECTIVE BOX
HPI  Patient is a 71y old Male who presents with a chief complaint of AMS (08 Sep 2021 09:48)    Currently admitted to medicine with the primary diagnosis of Hyperglycemia       Today is hospital day 15d.     INTERVAL HPI / OVERNIGHT EVENTS:  Patient was examined and seen at bedside. This morning he is resting comfortably in bed and reports no new issues or overnight events. He reports a mild headache.     ROS: Otherwise unremarkable     PAST MEDICAL & SURGICAL HISTORY  DM (diabetes mellitus)  12/27/18 hgb aic  11.2    HTN (hypertension)    Dyslipidemia    Diabetic foot ulcer    Depression    GERD (gastroesophageal reflux disease)    Diabetes    Toe amputation status, right  (2008)    History of amputation of toe  LT -partial 1st toe      ALLERGIES  No Known Allergies    MEDICATIONS  STANDING MEDICATIONS  albuterol/ipratropium for Nebulization 3 milliLiter(s) Nebulizer every 6 hours  aspirin  chewable 81 milliGRAM(s) Oral daily  atorvastatin 40 milliGRAM(s) Oral at bedtime  chlorhexidine 4% Liquid 1 Application(s) Topical <User Schedule>  clopidogrel Tablet 75 milliGRAM(s) Oral daily  doxazosin 4 milliGRAM(s) Oral at bedtime  insulin glargine Injectable (LANTUS) 20 Unit(s) SubCutaneous at bedtime  insulin lispro (ADMELOG) corrective regimen sliding scale   SubCutaneous Before meals and at bedtime  lacosamide IVPB 100 milliGRAM(s) IV Intermittent every 12 hours  levETIRAcetam  IVPB 1000 milliGRAM(s) IV Intermittent every 12 hours  lisinopril 10 milliGRAM(s) Oral daily  OLANZapine 5 milliGRAM(s) Oral at bedtime  pantoprazole   Suspension 40 milliGRAM(s) Oral daily  senna 2 Tablet(s) Oral at bedtime    PRN MEDICATIONS  acetaminophen   Tablet .. 650 milliGRAM(s) Oral every 6 hours PRN  haloperidol    Injectable 2 milliGRAM(s) IntraMuscular every 8 hours PRN  labetalol Injectable 10 milliGRAM(s) IV Push every 6 hours PRN    VITALS:  T(F): 97.6  HR: 81  BP: 146/67  RR: 18  SpO2: 98%    PHYSICAL EXAM  GEN: NAD, Resting comfortably in bed, calm and cooperative  PULM: Clear to auscultation bilaterally, No wheezes, mild b/l rhonchi  CVS: Regular rate and rhythm, S1-S2, no murmurs  ABD: Soft, non-tender, non-distended, no guarding  EXT: No edema  NEURO: A&Ox3, no focal deficits    LABS                        8.8    7.09  )-----------( 356      ( 08 Sep 2021 04:30 )             28.4     09-08    142  |  109  |  6<L>  ----------------------------<  207<H>  4.1   |  26  |  0.7    Ca    8.3<L>      08 Sep 2021 04:30  Mg     1.8     09-08    TPro  4.9<L>  /  Alb  2.8<L>  /  TBili  0.3  /  DBili  x   /  AST  35  /  ALT  31  /  AlkPhos  133<H>  09-08                  RADIOLOGY    < from: TTE Echo Complete w/o Contrast w/ Doppler (09.07.21 @ 11:24) >  Summary:   1. Normal global left ventricular systolic function.   2. Mildly enlarged left atrium.   3. Normal right atrial size.    < end of copied text >   Fluence Units: J/cm2

## 2021-09-09 ENCOUNTER — TRANSCRIPTION ENCOUNTER (OUTPATIENT)
Age: 71
End: 2021-09-09

## 2021-09-09 ENCOUNTER — INPATIENT (INPATIENT)
Facility: HOSPITAL | Age: 71
LOS: 11 days | Discharge: HOME HEALTH | End: 2021-09-21
Attending: PHYSICAL MEDICINE & REHABILITATION | Admitting: PHYSICAL MEDICINE & REHABILITATION
Payer: MEDICARE

## 2021-09-09 VITALS — HEIGHT: 71 IN | WEIGHT: 181.44 LBS

## 2021-09-09 VITALS
HEART RATE: 87 BPM | DIASTOLIC BLOOD PRESSURE: 55 MMHG | RESPIRATION RATE: 18 BRPM | SYSTOLIC BLOOD PRESSURE: 111 MMHG | TEMPERATURE: 98 F

## 2021-09-09 DIAGNOSIS — Z89.429 ACQUIRED ABSENCE OF OTHER TOE(S), UNSPECIFIED SIDE: Chronic | ICD-10-CM

## 2021-09-09 DIAGNOSIS — I10 ESSENTIAL (PRIMARY) HYPERTENSION: ICD-10-CM

## 2021-09-09 DIAGNOSIS — G93.40 ENCEPHALOPATHY, UNSPECIFIED: ICD-10-CM

## 2021-09-09 DIAGNOSIS — G40.901 EPILEPSY, UNSPECIFIED, NOT INTRACTABLE, WITH STATUS EPILEPTICUS: ICD-10-CM

## 2021-09-09 DIAGNOSIS — J69.0 PNEUMONITIS DUE TO INHALATION OF FOOD AND VOMIT: ICD-10-CM

## 2021-09-09 DIAGNOSIS — I63.9 CEREBRAL INFARCTION, UNSPECIFIED: ICD-10-CM

## 2021-09-09 DIAGNOSIS — Z89.421 ACQUIRED ABSENCE OF OTHER RIGHT TOE(S): Chronic | ICD-10-CM

## 2021-09-09 DIAGNOSIS — E11.9 TYPE 2 DIABETES MELLITUS WITHOUT COMPLICATIONS: ICD-10-CM

## 2021-09-09 LAB
ALBUMIN SERPL ELPH-MCNC: 3.1 G/DL — LOW (ref 3.5–5.2)
ALP SERPL-CCNC: 128 U/L — HIGH (ref 30–115)
ALT FLD-CCNC: 28 U/L — SIGNIFICANT CHANGE UP (ref 0–41)
ANION GAP SERPL CALC-SCNC: 9 MMOL/L — SIGNIFICANT CHANGE UP (ref 7–14)
AST SERPL-CCNC: 26 U/L — SIGNIFICANT CHANGE UP (ref 0–41)
BILIRUB SERPL-MCNC: 0.3 MG/DL — SIGNIFICANT CHANGE UP (ref 0.2–1.2)
BUN SERPL-MCNC: 5 MG/DL — LOW (ref 10–20)
CALCIUM SERPL-MCNC: 8.6 MG/DL — SIGNIFICANT CHANGE UP (ref 8.5–10.1)
CHLORIDE SERPL-SCNC: 108 MMOL/L — SIGNIFICANT CHANGE UP (ref 98–110)
CO2 SERPL-SCNC: 28 MMOL/L — SIGNIFICANT CHANGE UP (ref 17–32)
CREAT SERPL-MCNC: 0.7 MG/DL — SIGNIFICANT CHANGE UP (ref 0.7–1.5)
GLUCOSE BLDC GLUCOMTR-MCNC: 175 MG/DL — HIGH (ref 70–99)
GLUCOSE BLDC GLUCOMTR-MCNC: 249 MG/DL — HIGH (ref 70–99)
GLUCOSE BLDC GLUCOMTR-MCNC: 252 MG/DL — HIGH (ref 70–99)
GLUCOSE SERPL-MCNC: 176 MG/DL — HIGH (ref 70–99)
HCT VFR BLD CALC: 29.1 % — LOW (ref 42–52)
HGB BLD-MCNC: 9 G/DL — LOW (ref 14–18)
MAGNESIUM SERPL-MCNC: 1.7 MG/DL — LOW (ref 1.8–2.4)
MCHC RBC-ENTMCNC: 28.2 PG — SIGNIFICANT CHANGE UP (ref 27–31)
MCHC RBC-ENTMCNC: 30.9 G/DL — LOW (ref 32–37)
MCV RBC AUTO: 91.2 FL — SIGNIFICANT CHANGE UP (ref 80–94)
NRBC # BLD: 0 /100 WBCS — SIGNIFICANT CHANGE UP (ref 0–0)
PLATELET # BLD AUTO: 347 K/UL — SIGNIFICANT CHANGE UP (ref 130–400)
POTASSIUM SERPL-MCNC: 4.2 MMOL/L — SIGNIFICANT CHANGE UP (ref 3.5–5)
POTASSIUM SERPL-SCNC: 4.2 MMOL/L — SIGNIFICANT CHANGE UP (ref 3.5–5)
PROT SERPL-MCNC: 5.1 G/DL — LOW (ref 6–8)
RBC # BLD: 3.19 M/UL — LOW (ref 4.7–6.1)
RBC # FLD: 13.2 % — SIGNIFICANT CHANGE UP (ref 11.5–14.5)
SODIUM SERPL-SCNC: 145 MMOL/L — SIGNIFICANT CHANGE UP (ref 135–146)
WBC # BLD: 6.2 K/UL — SIGNIFICANT CHANGE UP (ref 4.8–10.8)
WBC # FLD AUTO: 6.2 K/UL — SIGNIFICANT CHANGE UP (ref 4.8–10.8)

## 2021-09-09 PROCEDURE — 93010 ELECTROCARDIOGRAM REPORT: CPT

## 2021-09-09 PROCEDURE — 99239 HOSP IP/OBS DSCHRG MGMT >30: CPT

## 2021-09-09 PROCEDURE — 99232 SBSQ HOSP IP/OBS MODERATE 35: CPT

## 2021-09-09 RX ORDER — LACOSAMIDE 50 MG/1
100 TABLET ORAL EVERY 12 HOURS
Refills: 0 | Status: DISCONTINUED | OUTPATIENT
Start: 2021-09-09 | End: 2021-09-09

## 2021-09-09 RX ORDER — GLUCAGON INJECTION, SOLUTION 0.5 MG/.1ML
1 INJECTION, SOLUTION SUBCUTANEOUS ONCE
Refills: 0 | Status: DISCONTINUED | OUTPATIENT
Start: 2021-09-09 | End: 2021-09-21

## 2021-09-09 RX ORDER — BENAZEPRIL HYDROCHLORIDE 40 MG/1
1 TABLET ORAL
Qty: 0 | Refills: 0 | DISCHARGE

## 2021-09-09 RX ORDER — DEXTROSE 50 % IN WATER 50 %
25 SYRINGE (ML) INTRAVENOUS ONCE
Refills: 0 | Status: DISCONTINUED | OUTPATIENT
Start: 2021-09-09 | End: 2021-09-21

## 2021-09-09 RX ORDER — ENOXAPARIN SODIUM 100 MG/ML
40 INJECTION SUBCUTANEOUS DAILY
Refills: 0 | Status: DISCONTINUED | OUTPATIENT
Start: 2021-09-09 | End: 2021-09-21

## 2021-09-09 RX ORDER — ENOXAPARIN SODIUM 100 MG/ML
0 INJECTION SUBCUTANEOUS
Qty: 0 | Refills: 0 | DISCHARGE
Start: 2021-09-09

## 2021-09-09 RX ORDER — ROSUVASTATIN CALCIUM 5 MG/1
1 TABLET ORAL
Qty: 0 | Refills: 0 | DISCHARGE

## 2021-09-09 RX ORDER — LISINOPRIL 2.5 MG/1
10 TABLET ORAL DAILY
Refills: 0 | Status: DISCONTINUED | OUTPATIENT
Start: 2021-09-09 | End: 2021-09-11

## 2021-09-09 RX ORDER — DOXAZOSIN MESYLATE 4 MG
4 TABLET ORAL AT BEDTIME
Refills: 0 | Status: DISCONTINUED | OUTPATIENT
Start: 2021-09-09 | End: 2021-09-21

## 2021-09-09 RX ORDER — PANTOPRAZOLE SODIUM 20 MG/1
40 TABLET, DELAYED RELEASE ORAL DAILY
Refills: 0 | Status: DISCONTINUED | OUTPATIENT
Start: 2021-09-09 | End: 2021-09-21

## 2021-09-09 RX ORDER — LEVETIRACETAM 250 MG/1
1000 TABLET, FILM COATED ORAL
Refills: 0 | Status: DISCONTINUED | OUTPATIENT
Start: 2021-09-09 | End: 2021-09-21

## 2021-09-09 RX ORDER — INSULIN LISPRO 100/ML
VIAL (ML) SUBCUTANEOUS
Refills: 0 | Status: DISCONTINUED | OUTPATIENT
Start: 2021-09-09 | End: 2021-09-18

## 2021-09-09 RX ORDER — CLOPIDOGREL BISULFATE 75 MG/1
1 TABLET, FILM COATED ORAL
Qty: 0 | Refills: 0 | DISCHARGE
Start: 2021-09-09

## 2021-09-09 RX ORDER — ASPIRIN/CALCIUM CARB/MAGNESIUM 324 MG
81 TABLET ORAL DAILY
Refills: 0 | Status: DISCONTINUED | OUTPATIENT
Start: 2021-09-09 | End: 2021-09-21

## 2021-09-09 RX ORDER — INSULIN GLARGINE 100 [IU]/ML
20 INJECTION, SOLUTION SUBCUTANEOUS AT BEDTIME
Refills: 0 | Status: DISCONTINUED | OUTPATIENT
Start: 2021-09-09 | End: 2021-09-10

## 2021-09-09 RX ORDER — ATORVASTATIN CALCIUM 80 MG/1
1 TABLET, FILM COATED ORAL
Qty: 0 | Refills: 0 | DISCHARGE
Start: 2021-09-09

## 2021-09-09 RX ORDER — LACOSAMIDE 50 MG/1
100 TABLET ORAL EVERY 12 HOURS
Refills: 0 | Status: DISCONTINUED | OUTPATIENT
Start: 2021-09-09 | End: 2021-09-21

## 2021-09-09 RX ORDER — MAGNESIUM SULFATE 500 MG/ML
2 VIAL (ML) INJECTION ONCE
Refills: 0 | Status: COMPLETED | OUTPATIENT
Start: 2021-09-09 | End: 2021-09-09

## 2021-09-09 RX ORDER — IPRATROPIUM/ALBUTEROL SULFATE 18-103MCG
3 AEROSOL WITH ADAPTER (GRAM) INHALATION
Qty: 0 | Refills: 0 | DISCHARGE
Start: 2021-09-09

## 2021-09-09 RX ORDER — MAGNESIUM SULFATE 500 MG/ML
1 VIAL (ML) INJECTION ONCE
Refills: 0 | Status: COMPLETED | OUTPATIENT
Start: 2021-09-09 | End: 2021-09-09

## 2021-09-09 RX ORDER — ATORVASTATIN CALCIUM 80 MG/1
40 TABLET, FILM COATED ORAL AT BEDTIME
Refills: 0 | Status: DISCONTINUED | OUTPATIENT
Start: 2021-09-09 | End: 2021-09-21

## 2021-09-09 RX ORDER — OLANZAPINE 15 MG/1
5 TABLET, FILM COATED ORAL AT BEDTIME
Refills: 0 | Status: DISCONTINUED | OUTPATIENT
Start: 2021-09-09 | End: 2021-09-21

## 2021-09-09 RX ORDER — INSULIN GLARGINE 100 [IU]/ML
30 INJECTION, SOLUTION SUBCUTANEOUS
Qty: 0 | Refills: 0 | DISCHARGE

## 2021-09-09 RX ORDER — DEXTROSE 50 % IN WATER 50 %
15 SYRINGE (ML) INTRAVENOUS ONCE
Refills: 0 | Status: DISCONTINUED | OUTPATIENT
Start: 2021-09-09 | End: 2021-09-21

## 2021-09-09 RX ORDER — SODIUM CHLORIDE 9 MG/ML
1000 INJECTION, SOLUTION INTRAVENOUS
Refills: 0 | Status: DISCONTINUED | OUTPATIENT
Start: 2021-09-09 | End: 2021-09-21

## 2021-09-09 RX ORDER — ALBUTEROL 90 UG/1
2 AEROSOL, METERED ORAL EVERY 6 HOURS
Refills: 0 | Status: DISCONTINUED | OUTPATIENT
Start: 2021-09-09 | End: 2021-09-21

## 2021-09-09 RX ORDER — MAGNESIUM HYDROXIDE 400 MG/1
30 TABLET, CHEWABLE ORAL DAILY
Refills: 0 | Status: DISCONTINUED | OUTPATIENT
Start: 2021-09-09 | End: 2021-09-21

## 2021-09-09 RX ORDER — SENNA PLUS 8.6 MG/1
2 TABLET ORAL AT BEDTIME
Refills: 0 | Status: DISCONTINUED | OUTPATIENT
Start: 2021-09-09 | End: 2021-09-21

## 2021-09-09 RX ORDER — ENOXAPARIN SODIUM 100 MG/ML
40 INJECTION SUBCUTANEOUS DAILY
Refills: 0 | Status: DISCONTINUED | OUTPATIENT
Start: 2021-09-09 | End: 2021-09-09

## 2021-09-09 RX ORDER — LEVETIRACETAM 250 MG/1
1000 TABLET, FILM COATED ORAL EVERY 12 HOURS
Refills: 0 | Status: DISCONTINUED | OUTPATIENT
Start: 2021-09-09 | End: 2021-09-09

## 2021-09-09 RX ORDER — LEVETIRACETAM 250 MG/1
1 TABLET, FILM COATED ORAL
Qty: 60 | Refills: 0
Start: 2021-09-09 | End: 2021-10-08

## 2021-09-09 RX ORDER — OLANZAPINE 15 MG/1
1 TABLET, FILM COATED ORAL
Qty: 0 | Refills: 0 | DISCHARGE
Start: 2021-09-09

## 2021-09-09 RX ORDER — LISINOPRIL 2.5 MG/1
1 TABLET ORAL
Qty: 0 | Refills: 0 | DISCHARGE
Start: 2021-09-09

## 2021-09-09 RX ORDER — LANOLIN ALCOHOL/MO/W.PET/CERES
3 CREAM (GRAM) TOPICAL AT BEDTIME
Refills: 0 | Status: DISCONTINUED | OUTPATIENT
Start: 2021-09-09 | End: 2021-09-21

## 2021-09-09 RX ORDER — DOXAZOSIN MESYLATE 4 MG
1 TABLET ORAL
Qty: 0 | Refills: 0 | DISCHARGE
Start: 2021-09-09

## 2021-09-09 RX ORDER — ACETAMINOPHEN 500 MG
650 TABLET ORAL EVERY 6 HOURS
Refills: 0 | Status: DISCONTINUED | OUTPATIENT
Start: 2021-09-09 | End: 2021-09-21

## 2021-09-09 RX ORDER — LABETALOL HCL 100 MG
10 TABLET ORAL EVERY 6 HOURS
Refills: 0 | Status: DISCONTINUED | OUTPATIENT
Start: 2021-09-09 | End: 2021-09-21

## 2021-09-09 RX ORDER — LACOSAMIDE 50 MG/1
1 TABLET ORAL
Qty: 60 | Refills: 0
Start: 2021-09-09 | End: 2021-10-08

## 2021-09-09 RX ORDER — BENZOCAINE 10 %
1 GEL (GRAM) MUCOUS MEMBRANE
Qty: 0 | Refills: 0 | DISCHARGE
Start: 2021-09-09

## 2021-09-09 RX ORDER — ACETAMINOPHEN 500 MG
2 TABLET ORAL
Qty: 0 | Refills: 0 | DISCHARGE
Start: 2021-09-09

## 2021-09-09 RX ORDER — HALOPERIDOL DECANOATE 100 MG/ML
2 INJECTION INTRAMUSCULAR EVERY 8 HOURS
Refills: 0 | Status: DISCONTINUED | OUTPATIENT
Start: 2021-09-09 | End: 2021-09-09

## 2021-09-09 RX ORDER — TIOTROPIUM BROMIDE 18 UG/1
1 CAPSULE ORAL; RESPIRATORY (INHALATION) DAILY
Refills: 0 | Status: DISCONTINUED | OUTPATIENT
Start: 2021-09-09 | End: 2021-09-21

## 2021-09-09 RX ORDER — BENZOCAINE 10 %
1 GEL (GRAM) MUCOUS MEMBRANE EVERY 6 HOURS
Refills: 0 | Status: DISCONTINUED | OUTPATIENT
Start: 2021-09-09 | End: 2021-09-21

## 2021-09-09 RX ORDER — INSULIN LISPRO 100/ML
4 VIAL (ML) SUBCUTANEOUS
Refills: 0 | Status: DISCONTINUED | OUTPATIENT
Start: 2021-09-09 | End: 2021-09-11

## 2021-09-09 RX ORDER — ASPIRIN/CALCIUM CARB/MAGNESIUM 324 MG
1 TABLET ORAL
Qty: 0 | Refills: 0 | DISCHARGE
Start: 2021-09-09

## 2021-09-09 RX ORDER — LACOSAMIDE 50 MG/1
100 TABLET ORAL
Refills: 0 | Status: DISCONTINUED | OUTPATIENT
Start: 2021-09-09 | End: 2021-09-09

## 2021-09-09 RX ORDER — CLOPIDOGREL BISULFATE 75 MG/1
75 TABLET, FILM COATED ORAL DAILY
Refills: 0 | Status: DISCONTINUED | OUTPATIENT
Start: 2021-09-09 | End: 2021-09-21

## 2021-09-09 RX ORDER — BENZOCAINE 10 %
1 GEL (GRAM) MUCOUS MEMBRANE EVERY 6 HOURS
Refills: 0 | Status: DISCONTINUED | OUTPATIENT
Start: 2021-09-09 | End: 2021-09-09

## 2021-09-09 RX ADMIN — LACOSAMIDE 120 MILLIGRAM(S): 50 TABLET ORAL at 06:17

## 2021-09-09 RX ADMIN — Medication 650 MILLIGRAM(S): at 11:28

## 2021-09-09 RX ADMIN — LEVETIRACETAM 400 MILLIGRAM(S): 250 TABLET, FILM COATED ORAL at 17:02

## 2021-09-09 RX ADMIN — Medication 3 MILLILITER(S): at 08:22

## 2021-09-09 RX ADMIN — PANTOPRAZOLE SODIUM 40 MILLIGRAM(S): 20 TABLET, DELAYED RELEASE ORAL at 11:29

## 2021-09-09 RX ADMIN — LEVETIRACETAM 400 MILLIGRAM(S): 250 TABLET, FILM COATED ORAL at 05:40

## 2021-09-09 RX ADMIN — Medication 4: at 12:04

## 2021-09-09 RX ADMIN — Medication 2: at 08:06

## 2021-09-09 RX ADMIN — Medication 25 GRAM(S): at 15:01

## 2021-09-09 RX ADMIN — Medication 6: at 16:54

## 2021-09-09 RX ADMIN — Medication 100 GRAM(S): at 11:27

## 2021-09-09 RX ADMIN — Medication 1 MILLIGRAM(S): at 11:35

## 2021-09-09 RX ADMIN — LACOSAMIDE 100 MILLIGRAM(S): 50 TABLET ORAL at 17:05

## 2021-09-09 RX ADMIN — CLOPIDOGREL BISULFATE 75 MILLIGRAM(S): 75 TABLET, FILM COATED ORAL at 11:28

## 2021-09-09 RX ADMIN — ENOXAPARIN SODIUM 40 MILLIGRAM(S): 100 INJECTION SUBCUTANEOUS at 11:29

## 2021-09-09 RX ADMIN — Medication 81 MILLIGRAM(S): at 11:28

## 2021-09-09 RX ADMIN — Medication 3 MILLILITER(S): at 14:46

## 2021-09-09 RX ADMIN — CHLORHEXIDINE GLUCONATE 1 APPLICATION(S): 213 SOLUTION TOPICAL at 05:40

## 2021-09-09 RX ADMIN — LISINOPRIL 10 MILLIGRAM(S): 2.5 TABLET ORAL at 05:41

## 2021-09-09 NOTE — PROGRESS NOTE ADULT - ATTENDING COMMENTS
Patient seen and examined and agree with above except as noted.  Patients history, notes, labs, imaging, vitals and meds reviewed personally.  Called to see patient specifically for facial twitching.  When no smiling no twitching was seen and when smiling the left cheek was twtiching (Myokemia).  Appeared to be triggered with facial muscle contraction.    Plan as above (OK for dc to acute rehab)

## 2021-09-09 NOTE — H&P ADULT - NSICDXPASTMEDICALHX_GEN_ALL_CORE_FT
PAST MEDICAL HISTORY:  Depression     Diabetic foot ulcer     DM (diabetes mellitus) Type 2, 12/27/18 hgb aic  11.2    Dyslipidemia     GERD (gastroesophageal reflux disease)     HTN (hypertension)     Neuropathy, diabetic

## 2021-09-09 NOTE — PROGRESS NOTE ADULT - ASSESSMENT
70 y/o M who was admitted for status epilepticus and HHA. s/p extubation and downgraded to floor. Patient has having some facial twitching which is unlikely to be seizure, msot likely muscle twitches  - c/w Keppra 1gm BID  - c/w Vimpat 100 mg BID  - PAtient will benefit from STR.

## 2021-09-09 NOTE — DISCHARGE NOTE PROVIDER - NSDCMRMEDTOKEN_GEN_ALL_CORE_FT
acetaminophen 325 mg oral tablet: 2 tab(s) orally every 6 hours, As needed, Temp greater or equal to 38C (100.4F), Mild Pain (1 - 3), Moderate Pain (4 - 6), Severe Pain (7 - 10)  aspirin 81 mg oral tablet, chewable: 1 tab(s) orally once a day  atorvastatin 40 mg oral tablet: 1 tab(s) orally once a day (at bedtime)  clopidogrel 75 mg oral tablet: 1 tab(s) orally once a day  doxazosin 4 mg oral tablet: 1 tab(s) orally once a day (at bedtime)  ipratropium-albuterol 0.5 mg-2.5 mg/3 mL inhalation solution: 3 milliliter(s) inhaled every 6 hours  Janumet 50 mg-1000 mg oral tablet: 1 tab(s) orally 2 times a day  lacosamide 100 mg oral tablet: 1 tab(s) orally every 12 hours MDD:200 mg  Lantus 100 units/mL subcutaneous solution: 20 unit(s) subcutaneous once a day (at bedtime)  levETIRAcetam 1000 mg oral tablet: 1 tab(s) orally every 12 hours   lisinopril 10 mg oral tablet: 1 tab(s) orally once a day  OLANZapine 5 mg oral tablet: 1 tab(s) orally once a day (at bedtime)  omeprazole 20 mg oral delayed release capsule: 1 cap(s) orally once a day  Trulicity Pen 0.75 mg/0.5 mL subcutaneous solution:    acetaminophen 325 mg oral tablet: 2 tab(s) orally every 6 hours, As needed, Temp greater or equal to 38C (100.4F), Mild Pain (1 - 3), Moderate Pain (4 - 6), Severe Pain (7 - 10)  aspirin 81 mg oral tablet, chewable: 1 tab(s) orally once a day  atorvastatin 40 mg oral tablet: 1 tab(s) orally once a day (at bedtime)  benzocaine 20% topical spray: 1 spray(s) topically every 6 hours, As needed, sore throat  clopidogrel 75 mg oral tablet: 1 tab(s) orally once a day  doxazosin 4 mg oral tablet: 1 tab(s) orally once a day (at bedtime)  enoxaparin:   ipratropium-albuterol 0.5 mg-2.5 mg/3 mL inhalation solution: 3 milliliter(s) inhaled every 6 hours  Janumet 50 mg-1000 mg oral tablet: 1 tab(s) orally 2 times a day  lacosamide 100 mg oral tablet: 1 tab(s) orally every 12 hours MDD:200 mg  Lantus 100 units/mL subcutaneous solution: 20 unit(s) subcutaneous once a day (at bedtime)  levETIRAcetam 1000 mg oral tablet: 1 tab(s) orally every 12 hours   lisinopril 10 mg oral tablet: 1 tab(s) orally once a day  OLANZapine 5 mg oral tablet: 1 tab(s) orally once a day (at bedtime)  omeprazole 20 mg oral delayed release capsule: 1 cap(s) orally once a day  Trulicity Pen 0.75 mg/0.5 mL subcutaneous solution:

## 2021-09-09 NOTE — H&P ADULT - ASSESSMENT
ASSESSMENT/PLAN    Rehab of Acute Occipital Infarcts, Metabolic Encephalopathy, with gait disorder and mental status changes. Good acute rehab candidate. Requires acute inpatient rehab and hospital setting to monitor blood sugars, monitor for further seizure activity and monitor mental status with Neurology f/u as needed.    S/P Status Epilepticus  - continue Lacosamide.  - Monitor    DM 2, s/p DKA, HHS  - fair control on insulin  - noncompliant at home  - monitor FS glucose and adjust insulin    s/p Pneumonia, s/p Respiratory Failure  - likely aspiration  - s/p antibiotics    Urinary Retention  - Basilio Catheter in place  - TOV when ambulatory    HTN  - monitor on current meds    Diabetic Neuropathy    History of Osteomyelitis, likely Diabetic Ulcers  - s/p remote amputations right toes, and recently left 1st and 2nd toes    History of Bipolar Depression  - Neuropsych Eval  - Psychiatry consult if unstable    Anemia  - Likely chronic disease.   - check w/u    HLD  - Atorvastatin      -FEN - monitor lytes, intake    - Diet: soft, DASH, carb controlled           Precautions / PROPHYLAXIS:      - Falls    - Ortho: Weight bearing status: WBAT      - DVT prophylaxis:  Lovenox      MEDICAL PROGNOSIS: GOOD            REHAB POTENTIAL: GOOD             ESTIMATED DISPOSITION: HOME WITH HOME CARE       [ x ]  The goals of the IRF admission were discussed with the patient and or family member, who agreed             ELOS:  [  x   ] 7-14    [    ]  14-21    [    ]  Other    THERAPY ORDERS and INITIAL INDIVIDUALIZED PLAN OF CARE:  This initial individualized interdisciplinary plan of care, which was established by me (the attending physiatrist), is based on elements from the post admission evaluation. The interdisciplinary therapy program is to be at least 3 hrs a day, at least 5 days per week from from physical, occupational and/ or speech therapies as ordered by me below.      [ x  ] P.T. 90 mins. /day at least 5 out of 7 days:  [  x ] superficial  modalities prn, [ x  ] A/AAROM, [ x  ] PREs, [ x  ] transfer training,            [ x  ] progressive ambulation, [x   ] stairs                                               [ x  ] O.T. 90 mins. /day at least 5 out of 7 days::  [ x  ] modalities prn,  [ x  ]A/AAROM, [ x  ] PREs, [  x ] functional transfer training, [ x  ] ADL training           [  x ] cognitive/ perceptual eval and training, [   ] splint eval                                                  [ x  ] S.L.P:  [ x  ] speech eval, [ x  ] swallow eval     [  x ] Neuropsychology eval     [ x  ] Individualized rec. therapy      RATIONALE FOR INPATIENT ADMISSION - Patient demonstrates the following: (check all that apply)  [X] Medically appropriate for acute rehabilitation admission. Requires interdisiplinary therapy consisting of at least PT and OT, at least 3 hrs. a day at least 5 days a week  [X] Has attainable rehab goals with an appropriate initial discharge plan  [X] Has rehabilitation potential (expected to make a significant improvement within a reasonable period of time)  [X] Requires close medical management by a rehab physician, rehab nursing care,  and comprehensive interdisciplinary team (including PT, OT)    [X] Requires evaluation by a physiatrist at least 3 days a week to evaluate and manage and coordinate rehab and medical problems

## 2021-09-09 NOTE — PROGRESS NOTE ADULT - PROVIDER SPECIALTY LIST ADULT
Critical Care
Internal Medicine
Internal Medicine
Nutrition Support
Critical Care
Electrophysiology
Electrophysiology
Critical Care
Hospitalist
Internal Medicine
Neurology
Neurology
Nutrition Support
Critical Care
Critical Care
Neurology
Neurology
Critical Care
Internal Medicine

## 2021-09-09 NOTE — DISCHARGE NOTE PROVIDER - NSDCCPCAREPLAN_GEN_ALL_CORE_FT
PRINCIPAL DISCHARGE DIAGNOSIS  Diagnosis: Hyperglycemia  Assessment and Plan of Treatment: Diabetes is a chronic condition caused by high blood sugar levels. Your blood sugar levels become high because your body does not have enough insulin. Insulin helps move sugar out of the blood so it can be used for energy. Increased sugar in your blood can cause problems in several organs of your body including but not limited to your blood vessels, your kidneys, your brain, and your eyes. The lack of insulin forces your body to use fat instead of sugar for energy. This can be dangerous if not controlled.  Seek Medical Attention If:  You have a seizure.  You begin to breathe fast, or are short of breath.  You become weak and confused.  You are more drowsy than usual.  You have fruity, sweet breath.  You have severe, new stomach pain and are vomiting.  Your blood sugar level is lower or higher than your healthcare provider says it should be.  You have ketones in your blood or urine.  You have a fever or chills.  You are more thirsty than usual.  You are urinating more often than usual.  You have questions or concerns about your condition or care.  Insulin and diabetes medicine decreases the amount of sugar in your blood.  The best way to prevent problems from Diabetes is to control your blood sugars.  Monitor your blood sugar levels closely. Administer Insulin as directed by your physician.   Speak with your doctor and/or a nutritionist about the best way to change your lifestyle and dietary habits to avoid any problems from Diabetes in the future.      SECONDARY DISCHARGE DIAGNOSES  Diagnosis: Seizures  Assessment and Plan of Treatment: Seizure  A seizure is abnormal electrical activity in the brain; the specific cause may or may not be found. Prior to a seizure you may experience a warning sensation (aura) that may include fear, nausea, dizziness, and visual changes such as flashing lights of spots. Common symptoms during the seizure may include an altered mental status, rhythmic jerking movements, drooling, grunting, loss of bladder or bowel control, or tongue biting. After a seizure, you may feel confused and sleepy.   Do not swim, drive, operate machinery, or engage in any risky activity during which a seizure could cause further injury to you or others. Teach friends and family what to do if you HAVE a seizure which includes laying you on the ground with your head on a cushion and turning you to the side to keep your breathing passages clear in case of vomiting.  SEEK IMMEDIATE MEDICAL CARE IF YOU HAVE ANY OF THE FOLLOWING SYMPTOMS: seizure lasting over 5 minutes, not waking up or persistent altered mental status after the seizure, or more frequent or worsening seizures.

## 2021-09-09 NOTE — H&P ADULT - NSHPREVIEWOFSYSTEMS_GEN_ALL_CORE
Constiutional:    [ x  ] WNL           [   ] poor appetite   [   ] insomnia   [   ] tired   ENT:                     [   ]                   [ x ]   voice change since intubation   Cardio:                [ x  ] WNL           [   ] CP   [   ] PHILLIPS   [   ] palpitations               Resp:                   [   ] WNL           [ x  ] SOB   [   ] cough   [   ] wheezing   GI:                        [ x  ] WNL           [   ] constipation   [   ] diarrhea   [   ] abdominal pain   [   ] nausea   [   ] emesis                                :                      [   ] WNL           [ x  ] JIMENEZ  [   ] dysuria   [   ] difficulty voiding             Endo:                   [ x  ] WNL          [   ] polyuria   [   ] temperature intolerance                 Skin:                     [ x  ] WNL          [   ] pain   [   ] wound   [   ] rash   MSK:                    [   ] WNL          [   ] muscle pain   [   ] joint pain/ stiffness   [   ] muscle tenderness   [   ] swelling  [ x ] multiple toe amputations right foot 10 years ago, 1st 2 toes left foot this year   Neuro:                 [  x ] WNL          [   ] HA   [   ] change in vision   [   ] tremor   [   ] weakness   [   ]dysphagia              Cognitive:           [   ] WNL           [  x ]confusion      Psych:                  [   ] WNL           [   ] hallucinations   [   ]agitation   [   ] delusion   [ x  ] bipolar depression

## 2021-09-09 NOTE — H&P ADULT - NSHPLABSRESULTS_GEN_ALL_CORE
9.0    6.20  )-----------( 347      ( 09 Sep 2021 05:33 )             29.1     09-09    145  |  108  |  5<L>  ----------------------------<  176<H>  4.2   |  28  |  0.7    Ca    8.6      09 Sep 2021 05:33  Mg     1.7     09-09    TPro  5.1<L>  /  Alb  3.1<L>  /  TBili  0.3  /  DBili  x   /  AST  26  /  ALT  28  /  AlkPhos  128<H>  09-09        POCT Blood Glucose.: 249 mg/dL (09-09-21 @ 11:48)  POCT Blood Glucose.: 175 mg/dL (09-09-21 @ 07:48)  POCT Blood Glucose.: 222 mg/dL (09-08-21 @ 21:09)  POCT Blood Glucose.: 229 mg/dL (09-08-21 @ 17:03)  POCT Blood Glucose.: 233 mg/dL (09-08-21 @ 11:15)  POCT Blood Glucose.: 183 mg/dL (09-08-21 @ 07:57)  POCT Blood Glucose.: 193 mg/dL (09-07-21 @ 20:43)  POCT Blood Glucose.: 303 mg/dL (09-07-21 @ 17:14)  POCT Blood Glucose.: 117 mg/dL (09-07-21 @ 07:32)  POCT Blood Glucose.: 230 mg/dL (09-06-21 @ 22:09)  POCT Blood Glucose.: 210 mg/dL (09-06-21 @ 21:00)  POCT Blood Glucose.: 254 mg/dL (09-06-21 @ 16:12)

## 2021-09-09 NOTE — H&P ADULT - NSHPSOCIALHISTORY_GEN_ALL_CORE
Lives in house with steps with his ex-wife. States he was fully independent PTA without device. Plans to go to daughter's home in [place] after d/c  Denies ETOH, drug, or cigarette use

## 2021-09-09 NOTE — DISCHARGE NOTE PROVIDER - HOSPITAL COURSE
70y M w/ hx of DMII, admitted due to seizure witnessed by wife. Pt admitted to ICU with DKA/HHS. Had seizures consistent with tonic-clonic seizure x2 in ED on 824. Pt was loaded with 3g Keppra and transferred to MICU where he was started on an insulin drip. He was subsequently intubated for airway protection and started on Versed gtt. Course was complicated by respiratory failure, pneumonia, encephalopathy, metabolic derangements. Tox screen was (_+) for benzos. Pt was extubated on . Pt was placed on VEEG which was negative for seizure. However, Vimpat was added to regime due to changes on EEG. Initial CTH/CTA was unremarkable. MRI on  revealed puncate acute infarcts involving L occipital cortex. CT Head on 9/3 revealed effusions consistent with mastoiditis.     # Altered Mental Status vs. Delirium - resolved ()  - per wife, pt is fully functional at baseline   - pt was very combative, kicking roommate and staff, not allowing people to come near him (9/3)   - as per psych, pt has delirium  - s/p Haldol Standinmg @ 6am, 1mg @ 1pm, 2mg in the evening. Additional PRN: 2mg q8h;   - c/w olanzapine 5 mg PO at bedtime  - pt back to baseline, calm and cooperative    #Status epilepticus- resolved   #AV dissociation  - veeg dcd   - Continue Keppra 1gram q8hrs  - Continue Vimpat 100mg q12hrs  - s/p loop recorder , f/u with EP outpt in 3-4 weeks  - echo WNL, mildly enlarged LA    #Small acute L occipital infarct  -C/w aspirin 81 mg daily   -C/w Statin  -added plavix and increased statin to 40mg on  as per neuro  - outpatient cognitive therapy per speech    # Non-specific Mastoid Effusion   - CT Head (9/3): Moderate left/mild right mastoid effusion; partial effusion in left middle ear cavity; possibly consistent with otomastoiditis  - f/u outpatient    # HHS/DKA- resolved   # DM II - controlled    - Sliding scale q AC and HS  - lantus 20 units at night   - A1C 5.7     #Acute encephalopathy  - mental status improved    # Pneumonia - likely from intubation; resolving   - s/p vancomycin/zosyn  --> vancomycin cefepime - --> vancomycin/aztreoname -  - finished aztreonam ;  - sputum  NG and blood clx negative  - duoneb and chest pt q 4     #Hypertension  - Lisinopril 10 mg PO daily started      #Urinary Retention  -Failed TOV x3; has lubin   -c/w cardura 4 mg daily     #Diet  - Dysphagia 3

## 2021-09-09 NOTE — PROGRESS NOTE ADULT - SUBJECTIVE AND OBJECTIVE BOX
Patient is a 71y old  Male who presents with a chief complaint of AMS (09 Sep 2021 14:19)      INTERVAL HPI/OVERNIGHT EVENTS: reported fascial twitching     REVIEW OF SYSTEMS: negative  Vital Signs Last 24 Hrs  T(C): 36.4 (09 Sep 2021 14:00), Max: 37.4 (08 Sep 2021 21:00)  T(F): 97.5 (09 Sep 2021 14:00), Max: 99.4 (08 Sep 2021 21:00)  HR: 87 (09 Sep 2021 14:00) (79 - 87)  BP: 111/55 (09 Sep 2021 14:00) (111/55 - 183/79)  BP(mean): 100 (09 Sep 2021 05:43) (100 - 114)  RR: 18 (09 Sep 2021 14:00) (18 - 18)  SpO2: --    PHYSICAL EXAM:   NAD; Normocephalic;   LUNGS - no wheezing  HEART: S1 S2+   ABDOMEN: Soft, Nontender, non distended  EXTREMITIES: no cyanosis; no edema  NERVOUS SYSTEM:  Awake and alert; no focal neuro deficits appreciated    LABS:                        9.0    6.20  )-----------( 347      ( 09 Sep 2021 05:33 )             29.1     09-09    145  |  108  |  5<L>  ----------------------------<  176<H>  4.2   |  28  |  0.7    Ca    8.6      09 Sep 2021 05:33  Mg     1.7     09-09    TPro  5.1<L>  /  Alb  3.1<L>  /  TBili  0.3  /  DBili  x   /  AST  26  /  ALT  28  /  AlkPhos  128<H>  09-09        CAPILLARY BLOOD GLUCOSE      POCT Blood Glucose.: 252 mg/dL (09 Sep 2021 16:44)  POCT Blood Glucose.: 249 mg/dL (09 Sep 2021 11:48)  POCT Blood Glucose.: 175 mg/dL (09 Sep 2021 07:48)  POCT Blood Glucose.: 222 mg/dL (08 Sep 2021 21:09)      Medications:  MEDICATIONS  (STANDING):  albuterol/ipratropium for Nebulization 3 milliLiter(s) Nebulizer every 6 hours  aspirin  chewable 81 milliGRAM(s) Oral daily  atorvastatin 40 milliGRAM(s) Oral at bedtime  chlorhexidine 4% Liquid 1 Application(s) Topical <User Schedule>  clopidogrel Tablet 75 milliGRAM(s) Oral daily  doxazosin 4 milliGRAM(s) Oral at bedtime  enoxaparin Injectable 40 milliGRAM(s) SubCutaneous daily  insulin glargine Injectable (LANTUS) 20 Unit(s) SubCutaneous at bedtime  insulin lispro (ADMELOG) corrective regimen sliding scale   SubCutaneous Before meals and at bedtime  lacosamide 100 milliGRAM(s) Oral every 12 hours  levETIRAcetam  IVPB 1000 milliGRAM(s) IV Intermittent every 12 hours  lisinopril 10 milliGRAM(s) Oral daily  OLANZapine 5 milliGRAM(s) Oral at bedtime  pantoprazole   Suspension 40 milliGRAM(s) Oral daily  senna 2 Tablet(s) Oral at bedtime    MEDICATIONS  (PRN):  acetaminophen   Tablet .. 650 milliGRAM(s) Oral every 6 hours PRN Temp greater or equal to 38C (100.4F), Mild Pain (1 - 3), Moderate Pain (4 - 6), Severe Pain (7 - 10)  benzocaine 20% Spray 1 Spray(s) Topical every 6 hours PRN sore throat  haloperidol    Injectable 2 milliGRAM(s) IntraMuscular every 8 hours PRN agitation/combative/self destructive  labetalol Injectable 10 milliGRAM(s) IV Push every 6 hours PRN Systolic blood pressure >

## 2021-09-09 NOTE — PROGRESS NOTE ADULT - TIME BILLING
For clinical care, patient education and care coordination.
Cryptogenic stroke
For clinical care, patient education and care coordination.
time spent on review of labs and imaging studies, obtaining history, personally examining patient, counselling and communicating with patient/ family, ordering medications/tests, discussion with other health care providers, documentation in electronic health records, independent interpretation of labs, imaging results and procedure results and care coordination.
For clinical care, patient education and care coordination.
Review of imaging and chart; obtaining history; examination of pt; discussion and coordination of care.
For clinical care, patient education and care coordination.

## 2021-09-09 NOTE — DISCHARGE NOTE NURSING/CASE MANAGEMENT/SOCIAL WORK - NSDCPEFALRISK_GEN_ALL_CORE
For information on Fall & injury Prevention, visit [email or web address]

## 2021-09-09 NOTE — DISCHARGE NOTE PROVIDER - CARE PROVIDER_API CALL
Tim Wang)  Neurology  [address] [place] [address], [address]  [place] [address]  Phone: [phone]  Fax: [phone]  Follow Up Time:

## 2021-09-09 NOTE — PROGRESS NOTE ADULT - SUBJECTIVE AND OBJECTIVE BOX
Neurology Progress Note    Interval History:  Patient was initially admitted to Neuro ICU for status epilepticus, intubated for airway protection, Eventually extubated and downgraded to floor, found to ghave small left occipital lobe infarct. Currently on DAPT, and KEppra 1gm BID, VImpat 100mg BID.   Today patient was supposed to be discharged to [facility]. But patient developed facial twitching on both sides.         PAST MEDICAL & SURGICAL HISTORY:  DM (diabetes mellitus)  12/27/18 hgb aic  11.2    HTN (hypertension)    Dyslipidemia    Diabetic foot ulcer    Depression    GERD (gastroesophageal reflux disease)    Diabetes    Toe amputation status, right  (2008)    History of amputation of toe  LT -partial 1st toe            Medications:  acetaminophen   Tablet .. 650 milliGRAM(s) Oral every 6 hours PRN  albuterol/ipratropium for Nebulization 3 milliLiter(s) Nebulizer every 6 hours  aspirin  chewable 81 milliGRAM(s) Oral daily  atorvastatin 40 milliGRAM(s) Oral at bedtime  chlorhexidine 4% Liquid 1 Application(s) Topical <User Schedule>  clopidogrel Tablet 75 milliGRAM(s) Oral daily  doxazosin 4 milliGRAM(s) Oral at bedtime  enoxaparin Injectable 40 milliGRAM(s) SubCutaneous daily  haloperidol    Injectable 2 milliGRAM(s) IntraMuscular every 8 hours PRN  insulin glargine Injectable (LANTUS) 20 Unit(s) SubCutaneous at bedtime  insulin lispro (ADMELOG) corrective regimen sliding scale   SubCutaneous Before meals and at bedtime  labetalol Injectable 10 milliGRAM(s) IV Push every 6 hours PRN  lacosamide 100 milliGRAM(s) Oral every 12 hours  levETIRAcetam  IVPB 1000 milliGRAM(s) IV Intermittent every 12 hours  lisinopril 10 milliGRAM(s) Oral daily  magnesium sulfate  IVPB 2 Gram(s) IV Intermittent once  OLANZapine 5 milliGRAM(s) Oral at bedtime  pantoprazole   Suspension 40 milliGRAM(s) Oral daily  senna 2 Tablet(s) Oral at bedtime      Vital Signs Last 24 Hrs  T(C): 37.1 (09 Sep 2021 05:43), Max: 37.4 (08 Sep 2021 21:00)  T(F): 98.8 (09 Sep 2021 05:43), Max: 99.4 (08 Sep 2021 21:00)  HR: 79 (09 Sep 2021 05:43) (79 - 81)  BP: 146/71 (09 Sep 2021 05:43) (146/71 - 183/79)  BP(mean): 100 (09 Sep 2021 05:43) (100 - 114)  RR: 18 (09 Sep 2021 05:43) (18 - 18)  SpO2: --    Neurological Exam:   Mental status: Awake, alert and oriented x3. Attention/concentration intact.  No dysarthria, no aphasia.   Cranial nerves: Pupils equally round and reactive to light, visual fields full, no nystagmus, extraocular muscles intact, V1 through V3 intact bilaterally and symmetric, face symmetric  Motor:  MRC grading 5/5 b/l UE/LE.   strength 5/5.  Normal tone and bulk.  No abnormal movements.    Sensation: Intact to light touch, proprioception, and pinprick.   Coordination: No dysmetria on finger-to-nose.  Reflexes: 2+ in bilateral UE/LE, downgoing toes bilaterally  Gait: Deferred    Labs:  CBC Full  -  ( 09 Sep 2021 05:33 )  WBC Count : 6.20 K/uL  RBC Count : 3.19 M/uL  Hemoglobin : 9.0 g/dL  Hematocrit : 29.1 %  Platelet Count - Automated : 347 K/uL  Mean Cell Volume : 91.2 fL  Mean Cell Hemoglobin : 28.2 pg  Mean Cell Hemoglobin Concentration : 30.9 g/dL  Auto Neutrophil # : x  Auto Lymphocyte # : x  Auto Monocyte # : x  Auto Eosinophil # : x  Auto Basophil # : x  Auto Neutrophil % : x  Auto Lymphocyte % : x  Auto Monocyte % : x  Auto Eosinophil % : x  Auto Basophil % : x    09-09    145  |  108  |  5<L>  ----------------------------<  176<H>  4.2   |  28  |  0.7    Ca    8.6      09 Sep 2021 05:33  Mg     1.7     09-09    TPro  5.1<L>  /  Alb  3.1<L>  /  TBili  0.3  /  DBili  x   /  AST  26  /  ALT  28  /  AlkPhos  128<H>  09-09    LIVER FUNCTIONS - ( 09 Sep 2021 05:33 )  Alb: 3.1 g/dL / Pro: 5.1 g/dL / ALK PHOS: 128 U/L / ALT: 28 U/L / AST: 26 U/L / GGT: x

## 2021-09-09 NOTE — DISCHARGE NOTE NURSING/CASE MANAGEMENT/SOCIAL WORK - PATIENT PORTAL LINK FT
You can access the FollowMyHealth Patient Portal offered by [facility] by registering at the following website: [email or web address]. By joining [facility]’s FollowMyHealth portal, you will also be able to view your health information using other applications (apps) compatible with our system.

## 2021-09-09 NOTE — H&P ADULT - NSHPPHYSICALEXAM_GEN_ALL_CORE
PHYSICAL EXAMINATION   VItals: T(C): 36.4 (09-09-21 @ 14:00), Max: 37.4 (09-08-21 @ 21:00)  HR: 87 (09-09-21 @ 14:00) (79 - 87)  BP: 111/55 (09-09-21 @ 14:00) (111/55 - 183/79)  RR: 18 (09-09-21 @ 14:00) (18 - 18)  SpO2: --    General:  [ x  ] WD/ WN male, NAD, Resting Comfortable,   [   ] other:                                HEENT: [  x ] NC/AT, EOMI, PERRL , Normal Conjunctivae,   [   ] other:  Cardio: [ x  ] RRR, no murmer,   [   ] other:                              Pulm: [ x  ] No Respiratory Distress,  Lungs CTAB,   [   ] other:                       Abdomen: [ x  ]ND/NT, Soft,   [   ] other:    : [   ] NO JIMENEZ CATHETER, [ x ] JIMENEZ CATHETER- no meatal tear, no discharge, [   ] other:                                            MSK: [ x  ] No joint swelling, Full ROM,   [  x ] other:   s/p amputation all toes right foot, 1st two toes left foot                                    Ext: [ x  ]No C/C/E, No calf tenderness,   [   ]other:    Skin: [ x  ]intact,   [   ] other:                                                                   Neurological Examination:  Cognitive: [  x  ] AAO x 3,   [    ]  other:                                                                      Attention:  [    ] intact,   [  x  ]  other:  needs redirection, tangential                          Memory: [    ] intact,    [  x  ]  other:   poor immediate and STR, (0/3)  Mood/Affect: [    ] wnl,    [  x  ]  other:   blunted affect                                                                         Communication: [  x  ]Fluent, no dysarthria, following commands:  [  x  ] other: hypophonic, tangential speech, redirectable  CN II - XII:  [  x  ] intact,  [    ] other:                                                                                        Motor:   RIGHT UE: [ x  ] WNL,  [   ] other:  LEFT    UE: [ x  ] WNL,  [   ] other:  RIGHT LE: [ x  ] WNL,  [   ] other:   LEFT    LE: [ x  ] WNL,  [   ] other:    Tone: [  x  ] wnl,   [    ]  other:  DTRs: [  x ]symmetric, [   ] other:  Coordination:   [   x ] intact,   [    ] other:                                                                           Sensory: [    ] Intact to light touch,   [  x  ] other:  decreased in a stocking-glove distribution

## 2021-09-09 NOTE — PROGRESS NOTE ADULT - ASSESSMENT
Plan:  1. muscle twitches    2. encephalopathy - delirium - resolved   3. pneumonia - aspiration vs VAP    4. acute L occipital infarct   5. DMII - HHS/DKA  6. status epilepticus   7. HTN   8. urinary retention    - noted to have fascial twicthing this morning   - given history of Seizure and recent stroke neuro was consulted - does not look like neurological in origin    - pt is fully functional at baseline - mental status at baseline this morning - AAOx3 on exam   - pt very combative, kicking roommate and staff, not allowing people to come near him (9/3)   - now back to baseline - s/p haldol standing and prn   - completed course of antibiotics for pneumonia   - sputum 8/31 (+) gram neg rods and gram positive cocci   - ID recs noted   - continue aspirin/plavix and atorvastatin   - loop recorder placed by EP today   - basal bolus insulin regimen   - A1c of 5.7  - continue keppra and vimpat   - continue lisinopril and cardura   - TOV prior to discharge   - OOB  - lovenox subq for DVT proph .    stable for discharge to

## 2021-09-09 NOTE — H&P ADULT - HISTORY OF PRESENT ILLNESS
History of Present Illness:   70 y/o M with pmh of DM 2, osteomyelitis, gerd, depression, HLD, HTN presenting to the ED for altered mental status.  The patient was doing well the  night prior to admission, but on 8/24/21, was noted to be unable to talk and walk independently. As per his ex wife, the right sided then started shaking. She called 911.   On presentation the patient had status epilepticus and he received 4 mg of lorazepam.   CT brain was done and an acute stroke could not be excluded because of technical difficulties.  His blood glucose was found to be more than 600. He admits to eating 2 hot dogs, french fries and a milk shake prior to the episode, but does not remember being brought to the hospital.    Pt admitted to ICU with DKA/HHS. Had seizures consistent with tonic-clonic seizure x2 in ED on 8/24. Pt was loaded with 3g Keppra and transferred to MICU where he was started on an insulin drip. He was subsequently intubated for airway protection and started on Versed gtt. Course was complicated by respiratory failure, pneumonia, encephalopathy, metabolic derangements. A Basilio Catheter was placed for urinary retention. He was treated with Vanco and Aztreonam for presumed aspiration pneumonia from intubation. Tox screen was positive for Benzos, though he denies taking drugs. Pt was extubated on 8/31. Pt was placed on VEEG which was negative for seizure. However, Vimpat was added to regime due to changes on EEG. MRI on 8/26 revealed punctate acute infarcts involving L occipital cortex. CT Head on 9/3 revealed effusions consistent with mastoiditis. A Loop Recorder was placed.  His ex-wife said that he has Bipolar Depression and he was on a medication, although this is not found in his record. She also does not recall his meds.       History of Present Illness:   70 y/o M with pmh of DM 2, osteomyelitis, gerd, depression, HLD, HTN presenting to the ED for altered mental status.  The patient was doing well the  night prior to admission, but on 8/24/21, was noted to be unable to talk and walk independently. As per his ex wife, the right sided then started shaking. She called 911.   On presentation the patient had status epilepticus and he received 4 mg of lorazepam.   CT brain was done and an acute stroke could not be excluded because of technical difficulties.  His blood glucose was found to be more than 600. He admits to eating 2 hot dogs, french fries and a milk shake prior to the episode, but does not remember being brought to the hospital.    Pt admitted to ICU with DKA/HHS. Had seizures consistent with tonic-clonic seizure x2 in ED on 8/24. Pt was loaded with 3g Keppra and transferred to MICU where he was started on an insulin drip. He was subsequently intubated for airway protection and started on Versed gtt. Course was complicated by respiratory failure, pneumonia, encephalopathy, metabolic derangements. A Basilio Catheter was placed for urinary retention. He was treated with Vanco and Aztreonam for presumed aspiration pneumonia from intubation. Tox screen was positive for Benzos, though he denies taking drugs. Pt was extubated on 8/31. Pt was placed on VEEG which was negative for seizure. However, Vimpat was added to regime due to changes on EEG. MRI on 8/26 revealed punctate acute infarcts involving L occipital cortex. CT Head on 9/3 revealed effusions consistent with mastoiditis. A Loop Recorder was placed on 9/8/21.  His ex-wife said that he has Bipolar Depression and he was on a medication, although this is not found in his record. She also does not recall his meds.    He was seen by PT and OT on the medical service. He requires min assistance to transfer bed to chair and CG to ambulate short distances and requires mod to max assistance for most ADL activities. He was evaluated by me on the medical service and was found to be a good acute inpatient rehab candidate.

## 2021-09-10 LAB
ALBUMIN SERPL ELPH-MCNC: 3.5 G/DL — SIGNIFICANT CHANGE UP (ref 3.5–5.2)
ALP SERPL-CCNC: 136 U/L — HIGH (ref 30–115)
ALT FLD-CCNC: 27 U/L — SIGNIFICANT CHANGE UP (ref 0–41)
ANION GAP SERPL CALC-SCNC: 10 MMOL/L — SIGNIFICANT CHANGE UP (ref 7–14)
AST SERPL-CCNC: 24 U/L — SIGNIFICANT CHANGE UP (ref 0–41)
BASOPHILS # BLD AUTO: 0.02 K/UL — SIGNIFICANT CHANGE UP (ref 0–0.2)
BASOPHILS NFR BLD AUTO: 0.3 % — SIGNIFICANT CHANGE UP (ref 0–1)
BILIRUB SERPL-MCNC: 0.4 MG/DL — SIGNIFICANT CHANGE UP (ref 0.2–1.2)
BUN SERPL-MCNC: 5 MG/DL — LOW (ref 10–20)
CALCIUM SERPL-MCNC: 8.8 MG/DL — SIGNIFICANT CHANGE UP (ref 8.5–10.1)
CHLORIDE SERPL-SCNC: 107 MMOL/L — SIGNIFICANT CHANGE UP (ref 98–110)
CO2 SERPL-SCNC: 24 MMOL/L — SIGNIFICANT CHANGE UP (ref 17–32)
CREAT SERPL-MCNC: 0.8 MG/DL — SIGNIFICANT CHANGE UP (ref 0.7–1.5)
EOSINOPHIL # BLD AUTO: 0.1 K/UL — SIGNIFICANT CHANGE UP (ref 0–0.7)
EOSINOPHIL NFR BLD AUTO: 1.5 % — SIGNIFICANT CHANGE UP (ref 0–8)
GLUCOSE BLDC GLUCOMTR-MCNC: 196 MG/DL — HIGH (ref 70–99)
GLUCOSE BLDC GLUCOMTR-MCNC: 255 MG/DL — HIGH (ref 70–99)
GLUCOSE BLDC GLUCOMTR-MCNC: 302 MG/DL — HIGH (ref 70–99)
GLUCOSE BLDC GLUCOMTR-MCNC: 307 MG/DL — HIGH (ref 70–99)
GLUCOSE SERPL-MCNC: 282 MG/DL — HIGH (ref 70–99)
HCT VFR BLD CALC: 32.8 % — LOW (ref 42–52)
HGB BLD-MCNC: 10.3 G/DL — LOW (ref 14–18)
IMM GRANULOCYTES NFR BLD AUTO: 1.9 % — HIGH (ref 0.1–0.3)
IRON SATN MFR SERPL: 37 % — SIGNIFICANT CHANGE UP (ref 15–50)
IRON SATN MFR SERPL: 54 UG/DL — SIGNIFICANT CHANGE UP (ref 35–150)
LYMPHOCYTES # BLD AUTO: 0.58 K/UL — LOW (ref 1.2–3.4)
LYMPHOCYTES # BLD AUTO: 8.4 % — LOW (ref 20.5–51.1)
MAGNESIUM SERPL-MCNC: 1.9 MG/DL — SIGNIFICANT CHANGE UP (ref 1.8–2.4)
MCHC RBC-ENTMCNC: 29 PG — SIGNIFICANT CHANGE UP (ref 27–31)
MCHC RBC-ENTMCNC: 31.4 G/DL — LOW (ref 32–37)
MCV RBC AUTO: 92.4 FL — SIGNIFICANT CHANGE UP (ref 80–94)
MONOCYTES # BLD AUTO: 0.45 K/UL — SIGNIFICANT CHANGE UP (ref 0.1–0.6)
MONOCYTES NFR BLD AUTO: 6.5 % — SIGNIFICANT CHANGE UP (ref 1.7–9.3)
NEUTROPHILS # BLD AUTO: 5.61 K/UL — SIGNIFICANT CHANGE UP (ref 1.4–6.5)
NEUTROPHILS NFR BLD AUTO: 81.4 % — HIGH (ref 42.2–75.2)
NRBC # BLD: 0 /100 WBCS — SIGNIFICANT CHANGE UP (ref 0–0)
PLATELET # BLD AUTO: 363 K/UL — SIGNIFICANT CHANGE UP (ref 130–400)
POTASSIUM SERPL-MCNC: 4.8 MMOL/L — SIGNIFICANT CHANGE UP (ref 3.5–5)
POTASSIUM SERPL-SCNC: 4.8 MMOL/L — SIGNIFICANT CHANGE UP (ref 3.5–5)
PROT SERPL-MCNC: 5.9 G/DL — LOW (ref 6–8)
RBC # BLD: 3.55 M/UL — LOW (ref 4.7–6.1)
RBC # FLD: 13.5 % — SIGNIFICANT CHANGE UP (ref 11.5–14.5)
SODIUM SERPL-SCNC: 141 MMOL/L — SIGNIFICANT CHANGE UP (ref 135–146)
TIBC SERPL-MCNC: 145 UG/DL — LOW (ref 220–430)
TSH SERPL-MCNC: 1.18 UIU/ML — SIGNIFICANT CHANGE UP (ref 0.27–4.2)
UIBC SERPL-MCNC: 91 UG/DL — LOW (ref 110–370)
WBC # BLD: 6.89 K/UL — SIGNIFICANT CHANGE UP (ref 4.8–10.8)
WBC # FLD AUTO: 6.89 K/UL — SIGNIFICANT CHANGE UP (ref 4.8–10.8)

## 2021-09-10 RX ORDER — INSULIN GLARGINE 100 [IU]/ML
20 INJECTION, SOLUTION SUBCUTANEOUS AT BEDTIME
Refills: 0 | Status: DISCONTINUED | OUTPATIENT
Start: 2021-09-10 | End: 2021-09-11

## 2021-09-10 RX ADMIN — LACOSAMIDE 100 MILLIGRAM(S): 50 TABLET ORAL at 18:33

## 2021-09-10 RX ADMIN — ALBUTEROL 2 PUFF(S): 90 AEROSOL, METERED ORAL at 20:00

## 2021-09-10 RX ADMIN — Medication 4: at 07:45

## 2021-09-10 RX ADMIN — ATORVASTATIN CALCIUM 40 MILLIGRAM(S): 80 TABLET, FILM COATED ORAL at 22:22

## 2021-09-10 RX ADMIN — Medication 4: at 11:37

## 2021-09-10 RX ADMIN — LEVETIRACETAM 1000 MILLIGRAM(S): 250 TABLET, FILM COATED ORAL at 17:09

## 2021-09-10 RX ADMIN — OLANZAPINE 5 MILLIGRAM(S): 15 TABLET, FILM COATED ORAL at 22:21

## 2021-09-10 RX ADMIN — Medication 650 MILLIGRAM(S): at 17:15

## 2021-09-10 RX ADMIN — LEVETIRACETAM 1000 MILLIGRAM(S): 250 TABLET, FILM COATED ORAL at 05:49

## 2021-09-10 RX ADMIN — Medication 4 UNIT(S): at 11:38

## 2021-09-10 RX ADMIN — INSULIN GLARGINE 20 UNIT(S): 100 INJECTION, SOLUTION SUBCUTANEOUS at 22:22

## 2021-09-10 RX ADMIN — Medication 1: at 17:07

## 2021-09-10 RX ADMIN — Medication 81 MILLIGRAM(S): at 11:38

## 2021-09-10 RX ADMIN — ENOXAPARIN SODIUM 40 MILLIGRAM(S): 100 INJECTION SUBCUTANEOUS at 11:39

## 2021-09-10 RX ADMIN — PANTOPRAZOLE SODIUM 40 MILLIGRAM(S): 20 TABLET, DELAYED RELEASE ORAL at 11:39

## 2021-09-10 RX ADMIN — CLOPIDOGREL BISULFATE 75 MILLIGRAM(S): 75 TABLET, FILM COATED ORAL at 11:40

## 2021-09-10 RX ADMIN — Medication 4 UNIT(S): at 07:45

## 2021-09-10 RX ADMIN — SENNA PLUS 2 TABLET(S): 8.6 TABLET ORAL at 22:21

## 2021-09-10 RX ADMIN — Medication 4 MILLIGRAM(S): at 22:21

## 2021-09-10 RX ADMIN — LACOSAMIDE 100 MILLIGRAM(S): 50 TABLET ORAL at 05:49

## 2021-09-10 RX ADMIN — Medication 4 UNIT(S): at 17:07

## 2021-09-10 RX ADMIN — Medication 650 MILLIGRAM(S): at 17:09

## 2021-09-10 RX ADMIN — LISINOPRIL 10 MILLIGRAM(S): 2.5 TABLET ORAL at 05:49

## 2021-09-10 NOTE — PROGRESS NOTE ADULT - SUBJECTIVE AND OBJECTIVE BOX
Patient is a 71y old  Male who presents with a chief complaint of stroke (09 Sep 2021 15:18)    HPI:  History of Present Illness:   72 y/o M with pmh of DM 2, osteomyelitis, gerd, depression, HLD, HTN presenting to the ED for altered mental status.  The patient was doing well the  night prior to admission, but on 8/24/21, was noted to be unable to talk and walk independently. As per his ex wife, the right sided then started shaking. She called 911.   On presentation the patient had status epilepticus and he received 4 mg of lorazepam.   CT brain was done and an acute stroke could not be excluded because of technical difficulties.  His blood glucose was found to be more than 600. He admits to eating 2 hot dogs, french fries and a milk shake prior to the episode, but does not remember being brought to the hospital.    Pt admitted to ICU with DKA/HHS. Had seizures consistent with tonic-clonic seizure x2 in ED on 8/24. Pt was loaded with 3g Keppra and transferred to MICU where he was started on an insulin drip. He was subsequently intubated for airway protection and started on Versed gtt. Course was complicated by respiratory failure, pneumonia, encephalopathy, metabolic derangements. A Jimenez Catheter was placed for urinary retention. He was treated with Vanco and Aztreonam for presumed aspiration pneumonia from intubation. Tox screen was positive for Benzos, though he denies taking drugs. Pt was extubated on 8/31. Pt was placed on VEEG which was negative for seizure. However, Vimpat was added to regime due to changes on EEG. MRI on 8/26 revealed punctate acute infarcts involving L occipital cortex. CT Head on 9/3 revealed effusions consistent with mastoiditis. A Loop Recorder was placed on 9/8/21.  His ex-wife said that he has Bipolar Depression and he was on a medication, although this is not found in his record. She also does not recall his meds.    He was seen by PT and OT on the medical service. He requires min assistance to transfer bed to chair and CG to ambulate short distances and requires mod to max assistance for most ADL activities. He was evaluated by me on the medical service and was found to be a good acute inpatient rehab candidate.    I examined the patient and reviewed the chart. There have been no significant changes since my history and physical except where documented below.    TODAY'S SUBJECTIVE & REVIEW OF SYMPTOMS:  Patient seen and examined this AM with Attending physiatrist with Dr. Matta.   Declined medications overnight. Blood pressure and glucose elevated this am likely from missing medication doses.   No acute complaints.  Voiding and passing stool spontaneously. T  olerating oral diet. Tolerating physical and occupational therapy. Therapies going well.   Vitals reviewed. Hemodynamically stable.   AM labs reviewed.     ROS   Constiutional:    [ x  ] WNL           [   ] poor appetite   [   ] insomnia   [   ] tired   ENT:                     [   ]                   [ x ]   voice change since intubation   Cardio:                [ x  ] WNL           [   ] CP   [   ] PHILLIPS   [   ] palpitations               Resp:                   [ x  ] WNL           [   ] SOB   [   ] cough   [   ] wheezing   GI:                        [ x  ] WNL           [   ] constipation   [   ] diarrhea   [   ] abdominal pain   [   ] nausea   [   ] emesis                                :                      [   ] WNL           [ x  ] JIMENEZ  [   ] dysuria   [   ] difficulty voiding             Endo:                   [ x  ] WNL          [   ] polyuria   [   ] temperature intolerance                 Skin:                     [ x  ] WNL          [   ] pain   [   ] wound   [   ] rash   MSK:                    [   ] WNL          [   ] muscle pain   [   ] joint pain/ stiffness   [   ] muscle tenderness   [   ] swelling  [ x ] multiple toe amputations right foot 10 years ago, 1st 2 toes left foot this year   Neuro:                 [  x ] WNL          [   ] HA   [   ] change in vision   [   ] tremor   [   ] weakness   [   ]dysphagia              Cognitive:           [ x  ] WNL           [   ]confusion      Psych:                  [   ] WNL           [   ] hallucinations   [   ]agitation   [   ] delusion   [ x  ] bipolar depression    PHYSICAL EXAM    Vital Signs Last 24 Hrs  T(C): 36.5 (09-10-21 @ 05:24), Max: 36.6 (09-09-21 @ 18:46)  T(F): 97.7 (09-10-21 @ 05:24), Max: 97.8 (09-09-21 @ 18:46)  HR: 86 (09-10-21 @ 05:24) (82 - 87)  BP: 176/87 (09-10-21 @ 05:24) (111/55 - 176/87)  BP(mean): --  RR: 18 (09-10-21 @ 05:24) (18 - 18)  SpO2: --    General:  [ x  ] WD/ WN male, NAD, Resting Comfortable,   [   ] other:                                HEENT: [  x ] NC/AT, EOMI, PERRL , Normal Conjunctivae,   [   ] other:  Cardio: [ x  ] RRR, no murmer,   [   ] other:                              Pulm: [ x  ] No Respiratory Distress,  Lungs CTAB,   [   ] other:                       Abdomen: [ x  ]ND/NT, Soft,   [   ] other:    : [   ] NO JIMENEZ CATHETER, [ x ] JIMENEZ CATHETER- no meatal tear, no discharge, [   ] other:                                            MSK: [ x  ] No joint swelling, Full ROM,   [  x ] other:   s/p amputation all toes right foot, 1st two toes left foot                                    Ext: [ x  ]No C/C/E, No calf tenderness,   [   ]other:    Skin: [ x  ]intact,   [   ] other:                                                                   Neurological Examination:  Cognitive: [  x  ] AAO x 3,   [    ]  other:                                                                      Attention:  [    ] intact,   [  x  ]  other:  needs redirection, tangential                                                                                            Communication: [  x  ]Fluent, no dysarthria, following commands:  [  x  ] other: hypophonic, tangential speech, redirectable  CN II - XII:  [  x  ] intact,  [    ] other:                                                                                        Motor:   RIGHT UE: [ x  ] WNL,  [   ] other:  LEFT    UE: [ x  ] WNL,  [   ] other:  RIGHT LE: [ x  ] WNL,  [   ] other:   LEFT    LE: [ x  ] WNL,  [   ] other:    Tone: [  x  ] wnl,   [    ]  other:  DTRs: [  x ]symmetric, [   ] other:  Coordination:   [   x ] intact,   [    ] other:                                                                           Sensory: [    ] Intact to light touch,   [  x  ] other:  decreased in a stocking-glove distribution    Medications   MEDICATIONS  (STANDING):  ALBUTerol    90 MICROgram(s) HFA Inhaler 2 Puff(s) Inhalation every 6 hours  aspirin  chewable 81 milliGRAM(s) Oral daily  atorvastatin 40 milliGRAM(s) Oral at bedtime  clopidogrel Tablet 75 milliGRAM(s) Oral daily  dextrose 40% Gel 15 Gram(s) Oral once  dextrose 5%. 1000 milliLiter(s) (50 mL/Hr) IV Continuous <Continuous>  dextrose 5%. 1000 milliLiter(s) (100 mL/Hr) IV Continuous <Continuous>  dextrose 50% Injectable 25 Gram(s) IV Push once  doxazosin 4 milliGRAM(s) Oral at bedtime  enoxaparin Injectable 40 milliGRAM(s) SubCutaneous daily  glucagon  Injectable 1 milliGRAM(s) IntraMuscular once  insulin glargine Injectable (LANTUS) 20 Unit(s) SubCutaneous at bedtime  insulin lispro (ADMELOG) corrective regimen sliding scale   SubCutaneous three times a day before meals  insulin lispro Injectable (ADMELOG) 4 Unit(s) SubCutaneous three times a day before meals  lacosamide 100 milliGRAM(s) Oral every 12 hours  levETIRAcetam 1000 milliGRAM(s) Oral two times a day  lisinopril 10 milliGRAM(s) Oral daily  OLANZapine 5 milliGRAM(s) Oral at bedtime  pantoprazole   Suspension 40 milliGRAM(s) Oral daily  senna 2 Tablet(s) Oral at bedtime  tiotropium 18 MICROgram(s) Capsule 1 Capsule(s) Inhalation daily    MEDICATIONS  (PRN):  acetaminophen   Tablet .. 650 milliGRAM(s) Oral every 6 hours PRN Temp greater or equal to 38C (100.4F), Mild Pain (1 - 3)  aluminum hydroxide/magnesium hydroxide/simethicone Suspension 30 milliLiter(s) Oral every 4 hours PRN Dyspepsia  benzocaine 20% Spray 1 Spray(s) Topical every 6 hours PRN sore throat  labetalol Injectable 10 milliGRAM(s) IV Push every 6 hours PRN Systolic blood pressure >  magnesium hydroxide Suspension 30 milliLiter(s) Oral daily PRN Constipation  melatonin 3 milliGRAM(s) Oral at bedtime PRN Insomnia      RECENT LABS/IMAGING                        10.3   6.89  )-----------( 363      ( 10 Sep 2021 06:42 )             32.8     09-10    141  |  107  |  5<L>  ----------------------------<  282<H>  4.8   |  24  |  0.8    Ca    8.8      10 Sep 2021 06:42  Mg     1.9     09-10    TPro  5.9<L>  /  Alb  3.5  /  TBili  0.4  /  DBili  x   /  AST  24  /  ALT  27  /  AlkPhos  136<H>  09-10       Patient is a 71y old  Male who presents with a chief complaint of stroke (09 Sep 2021 15:18)    HPI:  History of Present Illness:   72 y/o M with pmh of DM 2, osteomyelitis, gerd, depression, HLD, HTN presenting to the ED for altered mental status.  The patient was doing well the night prior to admission, but on 8/24/21, was noted to be unable to talk and walk independently. As per his ex wife, the right sided then started shaking. She called 911.   On presentation the patient had status epilepticus and he received 4 mg of lorazepam.   CT brain was done and an acute stroke could not be excluded because of technical difficulties.  His blood glucose was found to be more than 600. He admits to eating 2 hot dogs, french fries and a milk shake prior to the episode, but does not remember being brought to the hospital.    Pt admitted to ICU with DKA/HHS. Had seizures consistent with tonic-clonic seizure x2 in ED on 8/24. Pt was loaded with 3g Keppra and transferred to MICU where he was started on an insulin drip. He was subsequently intubated for airway protection and started on Versed gtt. Course was complicated by respiratory failure, pneumonia, encephalopathy, metabolic derangements. A Jimenez Catheter was placed for urinary retention. He was treated with Vanco and Aztreonam for presumed aspiration pneumonia from intubation. Tox screen was positive for Benzos, though he denies taking drugs. Pt was extubated on 8/31. Pt was placed on VEEG which was negative for seizure. However, Vimpat was added to regime due to changes on EEG. MRI on 8/26 revealed punctate acute infarcts involving L occipital cortex. CT Head on 9/3 revealed effusions consistent with mastoiditis. A Loop Recorder was placed on 9/8/21.  His ex-wife said that he has Bipolar Depression and he was on a medication, although this is not found in his record. She also does not recall his meds.    He was seen by PT and OT on the medical service. He requires min assistance to transfer bed to chair and CG to ambulate short distances and requires mod to max assistance for most ADL activities. He was evaluated by me on the medical service and was found to be a good acute inpatient rehab candidate.    I examined the patient and reviewed the chart. There have been no significant changes since my history and physical except where documented below.    TODAY'S SUBJECTIVE & REVIEW OF SYMPTOMS:  Patient seen and examined this AM with Attending physiatrist with Dr. Matta.   Declined medications overnight. Blood pressure and glucose elevated this am likely from missing medication doses.  No acute complaints.  Voiding and passing stool spontaneously. T  Tolerating oral diet. Tolerating physical and occupational therapy. Therapies going well.   Vitals reviewed. Hemodynamically stable.   AM labs reviewed.     ROS   Constiutional:    [ x  ] WNL           [   ] poor appetite   [   ] insomnia   [   ] tired   ENT:                     [   ]                   [ x ]   voice change since intubation   Cardio:                [ x  ] WNL           [   ] CP   [   ] PHILLIPS   [   ] palpitations               Resp:                   [ x  ] WNL           [   ] SOB   [   ] cough   [   ] wheezing   GI:                        [ x  ] WNL           [   ] constipation   [   ] diarrhea   [   ] abdominal pain   [   ] nausea   [   ] emesis                                :                      [   ] WNL           [ x  ] JIMENEZ  [   ] dysuria   [   ] difficulty voiding             Endo:                   [ x  ] WNL          [   ] polyuria   [   ] temperature intolerance                 Skin:                     [ x  ] WNL          [   ] pain   [   ] wound   [   ] rash   MSK:                    [   ] WNL          [   ] muscle pain   [   ] joint pain/ stiffness   [   ] muscle tenderness   [   ] swelling  [ x ] multiple toe amputations right foot 10 years ago, 1st 2 toes left foot this year   Neuro:                 [  x ] WNL          [   ] HA   [   ] change in vision   [   ] tremor   [   ] weakness   [   ]dysphagia              Cognitive:           [ x  ] WNL           [   ]confusion      Psych:                  [   ] WNL           [   ] hallucinations   [   ]agitation   [   ] delusion   [ x  ] bipolar depression    PHYSICAL EXAM    Vital Signs Last 24 Hrs  T(C): 36.5 (09-10-21 @ 05:24), Max: 36.6 (09-09-21 @ 18:46)  T(F): 97.7 (09-10-21 @ 05:24), Max: 97.8 (09-09-21 @ 18:46)  HR: 86 (09-10-21 @ 05:24) (82 - 87)  BP: 176/87 (09-10-21 @ 05:24) (111/55 - 176/87)  BP(mean): --  RR: 18 (09-10-21 @ 05:24) (18 - 18)  SpO2: --    General:  [ x  ] WD/ WN male, NAD, Resting Comfortable,   [   ] other:                                HEENT: [  x ] NC/AT, EOMI, PERRL , Normal Conjunctivae,   [   ] other:  Cardio: [ x  ] RRR, no murmer,   [   ] other:                              Pulm: [ x  ] No Respiratory Distress,  Lungs CTAB,   [   ] other:                       Abdomen: [ x  ]ND/NT, Soft,   [   ] other:    : [   ] NO JIMENEZ CATHETER, [ x ] JIMENEZ CATHETER- no meatal tear, no discharge, [   ] other:                                            MSK: [ x  ] No joint swelling, Full ROM,   [  x ] other:   s/p amputation all toes right foot, 1st two toes left foot                                    Ext: [ x  ]No C/C/E, No calf tenderness,   [   ]other:    Skin: [ x  ]intact,   [   ] other:                                                                   Neurological Examination:  Cognitive: [  x  ] AAO x 3,   [    ]  other:                                                                      Attention:  [    ] intact,   [  x  ]  other:  needs redirection, tangential                                                                                            Communication: [  x  ]Fluent, no dysarthria, following commands:  [  x  ] other: hypophonic, tangential speech, redirectable  CN II - XII:  [  x  ] intact,  [    ] other:                                                                                        Motor:   RIGHT UE: [ x  ] WNL,  [   ] other:  LEFT    UE: [ x  ] WNL,  [   ] other:  RIGHT LE: [ x  ] WNL,  [   ] other:   LEFT    LE: [ x  ] WNL,  [   ] other:    Tone: [  x  ] wnl,   [    ]  other:  DTRs: [  x ]symmetric, [   ] other:  Coordination:   [   x ] intact,   [    ] other:                                                                           Sensory: [    ] Intact to light touch,   [  x  ] other:  decreased in a stocking-glove distribution    Medications   MEDICATIONS  (STANDING):  ALBUTerol    90 MICROgram(s) HFA Inhaler 2 Puff(s) Inhalation every 6 hours  aspirin  chewable 81 milliGRAM(s) Oral daily  atorvastatin 40 milliGRAM(s) Oral at bedtime  clopidogrel Tablet 75 milliGRAM(s) Oral daily  dextrose 40% Gel 15 Gram(s) Oral once  dextrose 5%. 1000 milliLiter(s) (50 mL/Hr) IV Continuous <Continuous>  dextrose 5%. 1000 milliLiter(s) (100 mL/Hr) IV Continuous <Continuous>  dextrose 50% Injectable 25 Gram(s) IV Push once  doxazosin 4 milliGRAM(s) Oral at bedtime  enoxaparin Injectable 40 milliGRAM(s) SubCutaneous daily  glucagon  Injectable 1 milliGRAM(s) IntraMuscular once  insulin glargine Injectable (LANTUS) 20 Unit(s) SubCutaneous at bedtime  insulin lispro (ADMELOG) corrective regimen sliding scale   SubCutaneous three times a day before meals  insulin lispro Injectable (ADMELOG) 4 Unit(s) SubCutaneous three times a day before meals  lacosamide 100 milliGRAM(s) Oral every 12 hours  levETIRAcetam 1000 milliGRAM(s) Oral two times a day  lisinopril 10 milliGRAM(s) Oral daily  OLANZapine 5 milliGRAM(s) Oral at bedtime  pantoprazole   Suspension 40 milliGRAM(s) Oral daily  senna 2 Tablet(s) Oral at bedtime  tiotropium 18 MICROgram(s) Capsule 1 Capsule(s) Inhalation daily    MEDICATIONS  (PRN):  acetaminophen   Tablet .. 650 milliGRAM(s) Oral every 6 hours PRN Temp greater or equal to 38C (100.4F), Mild Pain (1 - 3)  aluminum hydroxide/magnesium hydroxide/simethicone Suspension 30 milliLiter(s) Oral every 4 hours PRN Dyspepsia  benzocaine 20% Spray 1 Spray(s) Topical every 6 hours PRN sore throat  labetalol Injectable 10 milliGRAM(s) IV Push every 6 hours PRN Systolic blood pressure >  magnesium hydroxide Suspension 30 milliLiter(s) Oral daily PRN Constipation  melatonin 3 milliGRAM(s) Oral at bedtime PRN Insomnia      RECENT LABS/IMAGING                        10.3   6.89  )-----------( 363      ( 10 Sep 2021 06:42 )             32.8     09-10    141  |  107  |  5<L>  ----------------------------<  282<H>  4.8   |  24  |  0.8    Ca    8.8      10 Sep 2021 06:42  Mg     1.9     09-10    TPro  5.9<L>  /  Alb  3.5  /  TBili  0.4  /  DBili  x   /  AST  24  /  ALT  27  /  AlkPhos  136<H>  09-10

## 2021-09-10 NOTE — PROGRESS NOTE ADULT - ASSESSMENT
ASSESSMENT/PLAN    Rehab of Acute Occipital Infarcts, Metabolic Encephalopathy, with gait disorder and mental status changes. Good acute rehab candidate. Requires acute inpatient rehab and hospital setting to monitor blood sugars, monitor for further seizure activity and monitor mental status with Neurology f/u as needed.  - Patient requires 3 hrs daily of interdisciplinary inpatient rehab at least 5 days a week.   - c/w aspirin   - c/w plavix     S/P Status Epilepticus  - continue Lacosamide 100 mg bid and levetiracetam 1000 mg po bid   - Monitor    DM 2, s/p DKA, HHS  - fair control on insulin  - glargine 20 units   - lispor 4 units TID AC   - sliding scale prn   - noncompliant at home  - monitor FS glucose and adjust insulin    s/p Pneumonia, s/p Respiratory Failure  - likely aspiration  - s/p antibiotics    Urinary Retention  - Basilio Catheter in place  - TOV when ambulatory    HTN  - monitor on current meds    Diabetic Neuropathy    History of Osteomyelitis, likely Diabetic Ulcers  - s/p remote amputations right toes, and recently left 1st and 2nd toes    History of Bipolar Depression  - Neuropsych Eval  - c/w olanzapine 5 mg po qhs   - Psychiatry consult if unstable    Anemia  - Likely chronic disease.   - iron 54   - TIBC 145    HLD  - Atorvastatin    Maintenance   GI - pantoprazole  BM - senna 2 tabs po qhs, magnesium hydroxide 30 ml po qd prn for constipation   sleep- melatonin 3 mg po qhs prn for insomnia   -FEN - monitor lytes, intake  - Diet: soft, DASH, carb controlled    Precautions / PROPHYLAXIS:    - Falls  - Ortho: Weight bearing status: WBAT  - DVT prophylaxis:  Lovenox     ASSESSMENT/PLAN    Rehab of Acute Left Occipital Infarcts, Metabolic Encephalopathy, with gait disorder and mental status changes. Good acute rehab candidate. Requires acute inpatient rehab and hospital setting to monitor blood sugars, monitor for further seizure activity and monitor mental status with Neurology f/u as needed.  - Patient requires 3 hrs daily of interdisciplinary inpatient rehab at least 5 days a week.   - c/w aspirin   - c/w plavix     S/P Status Epilepticus  - continue Lacosamide 100 mg bid and levetiracetam 1000 mg po bid   - Monitor    DM 2, s/p DKA, HHS  - fair control on insulin  - glargine 20 units   - lispro 4 units TID AC   - sliding scale prn   - noncompliant at home  - monitor FS glucose and adjust insulin    s/p Pneumonia, s/p Respiratory Failure  - likely aspiration  - s/p antibiotics    Urinary Retention  - Basilio Catheter in place  - TOV when ambulatory    HTN  - monitor on current meds    Diabetic Neuropathy    History of Osteomyelitis, likely Diabetic Ulcers  - s/p remote amputations right toes, and recently left 1st and 2nd toes    History of Bipolar Depression  - Neuropsych Eval  - c/w olanzapine 5 mg po qhs   - Psychiatry consult if unstable    Anemia  - Likely chronic disease.   - iron 54   - TIBC 145  - Fe sat 37%    HLD  - Atorvastatin    Maintenance   GI - pantoprazole  BM - senna 2 tabs po qhs, magnesium hydroxide 30 ml po qd prn for constipation   sleep- melatonin 3 mg po qhs prn for insomnia   -FEN - monitor lytes, intake  - Diet: soft, DASH, carb controlled    Precautions / PROPHYLAXIS:    - Falls  - Ortho: Weight bearing status: WBAT  - DVT prophylaxis:  Lovenox

## 2021-09-10 NOTE — PROGRESS NOTE ADULT - ASSESSMENT
Pain: Indicated Location: Neck/Back/Leg   Ratin/10     Intervention: Patient declined intervention/medication  Orientation: Self /Place/ Event /Month/Year/Day/ Date/ Time  Arousal Level: Alert  Behavior: Pleasant and cooperative  Affect Range:  WFL     Needed: ? No   #: N/A  Attention: ? WFL   Insight into illness/deficits: Good  Treatment Session Focused on Mood/ Coping/Cognition    Patient was seen to follow-up on mood, coping, and transition to the rehabilitation unit. Patient indicated he was in good spirits, however, feels as though he would be more successful in physical therapy if his IV and catheter were removed. Patient is hopeful and motivated for a positive recovery indicating that both physical and speech therapy were successful today. Patient is relying on his [place] and belief in the power of God as a coping mechanism for both familial stress and recovery. Patient indicated again to not contact his wife for a family contact, rather his daughter. However, he was unable to provide her contact information and it is not seen in the EMR        Goals: ? Facilitate Positive Coping ? Family Support / Education ? Pain Relief ? Cognitive Assessment   Plan: 1-2 times a week 30-60 minutes

## 2021-09-10 NOTE — PROGRESS NOTE ADULT - ATTENDING COMMENTS
I reviewed the chart and examined the patient with the resident and we discussed the findings and treatment plan. I agree with the findings and treatment plan above, which I modified as indicated. The patient requires 3 hrs a day of acute inpatient rehab.   Patient is stable. Full rehab eval in progress. Refused meds last night. He has psychiatric Dx and requires frequent redirection and education, but has been cooperative with therapies.    I read, edited and agree with the Assessment.    Rehab of Acute Left Occipital Infarcts, Metabolic Encephalopathy, with gait disorder and mental status changes. Good acute rehab candidate. Requires acute inpatient rehab and hospital setting to monitor blood sugars, monitor for further seizure activity and monitor mental status with Neurology f/u as needed.  - Patient requires 3 hrs daily of interdisciplinary inpatient rehab at least 5 days a week.   - c/w aspirin   - c/w plavix     S/P Status Epilepticus  - continue Lacosamide 100 mg bid and levetiracetam 1000 mg po bid   - Monitor    DM 2, s/p DKA, HHS  - fair control on insulin  - glargine 20 units   - lispro 4 units TID AC   - sliding scale prn   - noncompliant at home  - monitor FS glucose and adjust insulin    s/p Pneumonia, s/p Respiratory Failure  - likely aspiration  - s/p antibiotics    Urinary Retention  - Basilio Catheter in place  - TOV when ambulatory    HTN  - monitor on current meds    Diabetic Neuropathy    History of Osteomyelitis, likely Diabetic Ulcers  - s/p remote amputations right toes, and recently left 1st and 2nd toes    History of Bipolar Depression  - Neuropsych Eval  - c/w olanzapine 5 mg po qhs   - Psychiatry consult if unstable    Anemia  - Likely chronic disease.   - iron 54   - TIBC 145  - Fe sat 37%    HLD  - Atorvastatin    Maintenance   GI - pantoprazole  BM - senna 2 tabs po qhs, magnesium hydroxide 30 ml po qd prn for constipation   sleep- melatonin 3 mg po qhs prn for insomnia   -FEN - monitor lytes, intake  - Diet: soft, DASH, carb controlled    Precautions / PROPHYLAXIS:    - Falls  - Ortho: Weight bearing status: WBAT  - DVT prophylaxis:  Lovenox

## 2021-09-11 LAB
AMPHET UR-MCNC: NEGATIVE — SIGNIFICANT CHANGE UP
BARBITURATES, URINE.: NEGATIVE — SIGNIFICANT CHANGE UP
BENZODIAZ UR-MCNC: SIGNIFICANT CHANGE UP
COCAINE METAB.OTHER UR-MCNC: NEGATIVE — SIGNIFICANT CHANGE UP
COVID-19 SPIKE DOMAIN AB INTERP: POSITIVE
COVID-19 SPIKE DOMAIN ANTIBODY RESULT: 87.9 U/ML — HIGH
CREATININE, URINE THERAPEUTIC: 54.6 MG/DL — SIGNIFICANT CHANGE UP
FOLATE SERPL-MCNC: 17.5 NG/ML — SIGNIFICANT CHANGE UP
GLUCOSE BLDC GLUCOMTR-MCNC: 177 MG/DL — HIGH (ref 70–99)
GLUCOSE BLDC GLUCOMTR-MCNC: 284 MG/DL — HIGH (ref 70–99)
GLUCOSE BLDC GLUCOMTR-MCNC: 285 MG/DL — HIGH (ref 70–99)
GLUCOSE BLDC GLUCOMTR-MCNC: 294 MG/DL — HIGH (ref 70–99)
METHADONE UR-MCNC: NEGATIVE — SIGNIFICANT CHANGE UP
METHAQUALONE UR QL: NEGATIVE — SIGNIFICANT CHANGE UP
METHAQUALONE UR-MCNC: NEGATIVE — SIGNIFICANT CHANGE UP
OPIATES UR-MCNC: NEGATIVE — SIGNIFICANT CHANGE UP
PCP UR-MCNC: NEGATIVE — SIGNIFICANT CHANGE UP
PROPOXYPH UR QL: NEGATIVE — SIGNIFICANT CHANGE UP
SARS-COV-2 IGG+IGM SERPL QL IA: 87.9 U/ML — HIGH
SARS-COV-2 IGG+IGM SERPL QL IA: POSITIVE
THC UR QL: NEGATIVE — SIGNIFICANT CHANGE UP
VIT B12 SERPL-MCNC: 975 PG/ML — SIGNIFICANT CHANGE UP (ref 232–1245)

## 2021-09-11 RX ORDER — INSULIN GLARGINE 100 [IU]/ML
25 INJECTION, SOLUTION SUBCUTANEOUS AT BEDTIME
Refills: 0 | Status: DISCONTINUED | OUTPATIENT
Start: 2021-09-11 | End: 2021-09-14

## 2021-09-11 RX ORDER — CHLORHEXIDINE GLUCONATE 213 G/1000ML
1 SOLUTION TOPICAL
Refills: 0 | Status: DISCONTINUED | OUTPATIENT
Start: 2021-09-11 | End: 2021-09-21

## 2021-09-11 RX ORDER — LISINOPRIL 2.5 MG/1
5 TABLET ORAL DAILY
Refills: 0 | Status: DISCONTINUED | OUTPATIENT
Start: 2021-09-11 | End: 2021-09-21

## 2021-09-11 RX ORDER — MAGNESIUM SULFATE 500 MG/ML
2 VIAL (ML) INJECTION EVERY 4 HOURS
Refills: 0 | Status: DISCONTINUED | OUTPATIENT
Start: 2021-09-11 | End: 2021-09-11

## 2021-09-11 RX ORDER — INSULIN LISPRO 100/ML
7 VIAL (ML) SUBCUTANEOUS
Refills: 0 | Status: DISCONTINUED | OUTPATIENT
Start: 2021-09-11 | End: 2021-09-18

## 2021-09-11 RX ORDER — MAGNESIUM OXIDE 400 MG ORAL TABLET 241.3 MG
400 TABLET ORAL
Refills: 0 | Status: DISCONTINUED | OUTPATIENT
Start: 2021-09-11 | End: 2021-09-13

## 2021-09-11 RX ADMIN — Medication 3: at 11:35

## 2021-09-11 RX ADMIN — Medication 4 UNIT(S): at 12:45

## 2021-09-11 RX ADMIN — CLOPIDOGREL BISULFATE 75 MILLIGRAM(S): 75 TABLET, FILM COATED ORAL at 12:44

## 2021-09-11 RX ADMIN — LACOSAMIDE 100 MILLIGRAM(S): 50 TABLET ORAL at 07:13

## 2021-09-11 RX ADMIN — Medication 7 UNIT(S): at 16:26

## 2021-09-11 RX ADMIN — OLANZAPINE 5 MILLIGRAM(S): 15 TABLET, FILM COATED ORAL at 23:39

## 2021-09-11 RX ADMIN — INSULIN GLARGINE 25 UNIT(S): 100 INJECTION, SOLUTION SUBCUTANEOUS at 23:39

## 2021-09-11 RX ADMIN — ENOXAPARIN SODIUM 40 MILLIGRAM(S): 100 INJECTION SUBCUTANEOUS at 12:45

## 2021-09-11 RX ADMIN — Medication 81 MILLIGRAM(S): at 12:44

## 2021-09-11 RX ADMIN — LISINOPRIL 10 MILLIGRAM(S): 2.5 TABLET ORAL at 06:38

## 2021-09-11 RX ADMIN — Medication 3: at 08:13

## 2021-09-11 RX ADMIN — LACOSAMIDE 100 MILLIGRAM(S): 50 TABLET ORAL at 17:07

## 2021-09-11 RX ADMIN — PANTOPRAZOLE SODIUM 40 MILLIGRAM(S): 20 TABLET, DELAYED RELEASE ORAL at 12:46

## 2021-09-11 RX ADMIN — SENNA PLUS 2 TABLET(S): 8.6 TABLET ORAL at 23:39

## 2021-09-11 RX ADMIN — Medication 4 UNIT(S): at 08:13

## 2021-09-11 RX ADMIN — LEVETIRACETAM 1000 MILLIGRAM(S): 250 TABLET, FILM COATED ORAL at 06:38

## 2021-09-11 RX ADMIN — TIOTROPIUM BROMIDE 1 CAPSULE(S): 18 CAPSULE ORAL; RESPIRATORY (INHALATION) at 08:10

## 2021-09-11 RX ADMIN — MAGNESIUM OXIDE 400 MG ORAL TABLET 400 MILLIGRAM(S): 241.3 TABLET ORAL at 17:07

## 2021-09-11 RX ADMIN — LEVETIRACETAM 1000 MILLIGRAM(S): 250 TABLET, FILM COATED ORAL at 17:08

## 2021-09-11 RX ADMIN — Medication 4 MILLIGRAM(S): at 23:39

## 2021-09-11 RX ADMIN — ATORVASTATIN CALCIUM 40 MILLIGRAM(S): 80 TABLET, FILM COATED ORAL at 23:39

## 2021-09-11 RX ADMIN — Medication 3: at 16:26

## 2021-09-11 NOTE — CHART NOTE - NSCHARTNOTEFT_GEN_A_CORE
72 y/o M with pmh of DM 2, osteomyelitis, gerd, bipolar depression, HLD, HTN presented to the ED on 8/24/21 for altered mental status.  On presentation the patient had status epilepticus and he received 4 mg of lorazepam.   CT brain was done and an acute stroke could not be excluded because of technical difficulties.  His blood glucose was found to be more than 600.  Pt was admitted to ICU with DKA/HHS. Had seizures consistent with tonic-clonic seizure x2 in ED on 8/24. Pt was loaded with 3g Keppra and transferred to MICU where he was started on an insulin drip. He was subsequently intubated for airway protection and started on Versed gtt. Course was complicated by respiratory failure, pneumonia, encephalopathy, metabolic derangements. A Lubin Catheter was placed for urinary retention. He was treated with Vanco and Aztreonam/cefepime/zosyn for presumed aspiration pneumonia from intubation.   Tox screen was positive for Benzos, though he denies taking drugs. Pt was extubated on 8/31. Pt was placed on VEEG which was negative for seizure. However, Vimpat was added to Keppra regimen due to changes on EEG. MRI on 8/26 revealed punctate acute infarcts involving L occipital cortex. CT Head on 9/3 revealed effusions consistent with mastoiditis. A Loop Recorder was placed on 9/8/21.  His ex-wife said that he has Bipolar Depression and he was on a medication, although this is not found in his record. She also does not recall his meds. He is taking Zyprexa 5mg po q hs in the hospital.    At home he was taking Lantus and Janumet; A1C = 5.7 so he was previously well-controlled.  He was admitted to Rehab medicine service on 9/9/21.  His FS have been running quite high on current insulin regimen--Lantus 20 units sc q HS and lispro 4 units SC q AC.  Insulin was increased today to 25 units Lantus sc q hs and Lispro was increased to 7 units SC q AC.  His magnesium is also low, on 9/9 = 1.7; per Neuro, Mg++ should be kept >2; Mg++=1.9 on 9/10;  will start po mag oxide 400mg TID with meals.  Check all labs on Monday 9/13.    MEDICATIONS  (STANDING):  ALBUTerol    90 MICROgram(s) HFA Inhaler 2 Puff(s) Inhalation every 6 hours  aspirin  chewable 81 milliGRAM(s) Oral daily  atorvastatin 40 milliGRAM(s) Oral at bedtime  chlorhexidine 4% Liquid 1 Application(s) Topical <User Schedule>  clopidogrel Tablet 75 milliGRAM(s) Oral daily  dextrose 40% Gel 15 Gram(s) Oral once  dextrose 5%. 1000 milliLiter(s) (50 mL/Hr) IV Continuous <Continuous>  dextrose 5%. 1000 milliLiter(s) (100 mL/Hr) IV Continuous <Continuous>  dextrose 50% Injectable 25 Gram(s) IV Push once  doxazosin 4 milliGRAM(s) Oral at bedtime  enoxaparin Injectable 40 milliGRAM(s) SubCutaneous daily  glucagon  Injectable 1 milliGRAM(s) IntraMuscular once  insulin glargine Injectable (LANTUS) 25 Unit(s) SubCutaneous at bedtime  insulin lispro (ADMELOG) corrective regimen sliding scale   SubCutaneous three times a day before meals  insulin lispro Injectable (ADMELOG) 7 Unit(s) SubCutaneous three times a day before meals  lacosamide 100 milliGRAM(s) Oral every 12 hours  levETIRAcetam 1000 milliGRAM(s) Oral two times a day  lisinopril 10 milliGRAM(s) Oral daily  magnesium oxide 400 milliGRAM(s) Oral three times a day with meals  OLANZapine 5 milliGRAM(s) Oral at bedtime  pantoprazole   Suspension 40 milliGRAM(s) Oral daily  senna 2 Tablet(s) Oral at bedtime  tiotropium 18 MICROgram(s) Capsule 1 Capsule(s) Inhalation daily    MEDICATIONS  (PRN):  acetaminophen   Tablet .. 650 milliGRAM(s) Oral every 6 hours PRN Temp greater or equal to 38C (100.4F), Mild Pain (1 - 3)  aluminum hydroxide/magnesium hydroxide/simethicone Suspension 30 milliLiter(s) Oral every 4 hours PRN Dyspepsia  benzocaine 20% Spray 1 Spray(s) Topical every 6 hours PRN sore throat  labetalol Injectable 10 milliGRAM(s) IV Push every 6 hours PRN Systolic blood pressure >  magnesium hydroxide Suspension 30 milliLiter(s) Oral daily PRN Constipation  melatonin 3 milliGRAM(s) Oral at bedtime PRN Insomnia    Vital Signs Last 24 Hrs  T(C): 37.6 (09-11-21 @ 13:19), Max: 37.6 (09-11-21 @ 13:19)  T(F): 99.6 (09-11-21 @ 13:19), Max: 99.6 (09-11-21 @ 13:19)  HR: 91 (09-11-21 @ 13:19) (79 - 91)  BP: 92/51 (09-11-21 @ 13:19) (80/46 - 133/88)  BP(mean): --  RR: 18 (09-11-21 @ 13:19) (18 - 18)  SpO2: --      LABS:              10.3   6.89  )-----------( 363      ( 10 Sep 2021 06:42 )             32.8     09-10    141  |  107  |  5<L>  ----------------------------<  282<H>  4.8   |  24  |  0.8    Ca    8.8      10 Sep 2021 06:42  Mg     1.9     09-10    TPro  5.9<L>  /  Alb  3.5  /  TBili  0.4  /  DBili  x   /  AST  24  /  ALT  27  /  AlkPhos  136<H>  09-10      Continue to monitor VS, labs, FS closely  Monitor for sx of Urosepsis--he is not febrile but temp was 99.6 earlier and he has a lubin catheter; WBC was normal yesterday--if he becomes febrile, will order blood and urine cultures, UA, get CBC, CMP, and start ABX  continue lubin care q8h and more often if needed 70 y/o M with pmh of DM 2, osteomyelitis, gerd, bipolar depression, HLD, HTN presented to the ED on 8/24/21 for altered mental status.  On presentation the patient had status epilepticus and he received 4 mg of lorazepam.   CT brain was done and an acute stroke could not be excluded because of technical difficulties.  His blood glucose was found to be more than 600.  Pt was admitted to ICU with DKA/HHS. Had seizures consistent with tonic-clonic seizure x2 in ED on 8/24. Pt was loaded with 3g Keppra and transferred to MICU where he was started on an insulin drip. He was subsequently intubated for airway protection and started on Versed gtt. Course was complicated by respiratory failure, pneumonia, encephalopathy, metabolic derangements. A Lubin Catheter was placed for urinary retention. He was treated with Vanco and Aztreonam/cefepime/zosyn for presumed aspiration pneumonia from intubation.   Tox screen was positive for Benzos, though he denies taking drugs. Pt was extubated on 8/31. Pt was placed on VEEG which was negative for seizure. However, Vimpat was added to Keppra regimen due to changes on EEG. MRI on 8/26 revealed punctate acute infarcts involving L occipital cortex. CT Head on 9/3 revealed effusions consistent with mastoiditis. A Loop Recorder was placed on 9/8/21.  His ex-wife said that he has Bipolar Depression and he was on a medication, although this is not found in his record. She also does not recall his meds. He is taking Zyprexa 5mg po q hs in the hospital.    At home he was taking Lantus and Janumet; A1C = 5.7 so he was previously well-controlled.  He was admitted to Rehab medicine service on 9/9/21.  His FS have been running quite high on current insulin regimen--Lantus 20 units sc q HS and lispro 4 units SC q AC.  Insulin was increased today to 25 units Lantus sc q hs and Lispro was increased to 7 units SC q AC.  His magnesium is also low, on 9/9 = 1.7; per Neuro, Mg++ should be kept >2; Mg++=1.9 on 9/10;  will start po mag oxide 400mg TID with meals.  Check all labs on Monday 9/13.    Allergies: NKA    MEDICATIONS  (STANDING):  ALBUTerol    90 MICROgram(s) HFA Inhaler 2 Puff(s) Inhalation every 6 hours  aspirin  chewable 81 milliGRAM(s) Oral daily  atorvastatin 40 milliGRAM(s) Oral at bedtime  chlorhexidine 4% Liquid 1 Application(s) Topical <User Schedule>  clopidogrel Tablet 75 milliGRAM(s) Oral daily  dextrose 40% Gel 15 Gram(s) Oral once  dextrose 5%. 1000 milliLiter(s) (50 mL/Hr) IV Continuous <Continuous>  dextrose 5%. 1000 milliLiter(s) (100 mL/Hr) IV Continuous <Continuous>  dextrose 50% Injectable 25 Gram(s) IV Push once  doxazosin 4 milliGRAM(s) Oral at bedtime  enoxaparin Injectable 40 milliGRAM(s) SubCutaneous daily  glucagon  Injectable 1 milliGRAM(s) IntraMuscular once  insulin glargine Injectable (LANTUS) 25 Unit(s) SubCutaneous at bedtime  insulin lispro (ADMELOG) corrective regimen sliding scale   SubCutaneous three times a day before meals  insulin lispro Injectable (ADMELOG) 7 Unit(s) SubCutaneous three times a day before meals  lacosamide 100 milliGRAM(s) Oral every 12 hours  levETIRAcetam 1000 milliGRAM(s) Oral two times a day  lisinopril 10 milliGRAM(s) Oral daily  magnesium oxide 400 milliGRAM(s) Oral three times a day with meals  OLANZapine 5 milliGRAM(s) Oral at bedtime  pantoprazole   Suspension 40 milliGRAM(s) Oral daily  senna 2 Tablet(s) Oral at bedtime  tiotropium 18 MICROgram(s) Capsule 1 Capsule(s) Inhalation daily    MEDICATIONS  (PRN):  acetaminophen   Tablet .. 650 milliGRAM(s) Oral every 6 hours PRN Temp greater or equal to 38C (100.4F), Mild Pain (1 - 3)  aluminum hydroxide/magnesium hydroxide/simethicone Suspension 30 milliLiter(s) Oral every 4 hours PRN Dyspepsia  benzocaine 20% Spray 1 Spray(s) Topical every 6 hours PRN sore throat  labetalol Injectable 10 milliGRAM(s) IV Push every 6 hours PRN Systolic blood pressure >  magnesium hydroxide Suspension 30 milliLiter(s) Oral daily PRN Constipation  melatonin 3 milliGRAM(s) Oral at bedtime PRN Insomnia    Vital Signs Last 24 Hrs  T(C): 37.6 (09-11-21 @ 13:19), Max: 37.6 (09-11-21 @ 13:19)  T(F): 99.6 (09-11-21 @ 13:19), Max: 99.6 (09-11-21 @ 13:19)  HR: 91 (09-11-21 @ 13:19) (79 - 91)  BP: 92/51 (09-11-21 @ 13:19) (80/46 - 133/88)  BP(mean): --  RR: 18 (09-11-21 @ 13:19) (18 - 18)  SpO2: --      LABS:              10.3   6.89  )-----------( 363      ( 10 Sep 2021 06:42 )             32.8     09-10    141  |  107  |  5<L>  ----------------------------<  282<H>  4.8   |  24  |  0.8    Ca    8.8      10 Sep 2021 06:42  Mg     1.9     09-10    TPro  5.9<L>  /  Alb  3.5  /  TBili  0.4  /  DBili  x   /  AST  24  /  ALT  27  /  AlkPhos  136<H>  09-10  TSH = 1.18 normal  B12 = 975  normal  folate = 17.5   normal  A1C = 5.7  COVID negative 9/6       -- Continue to monitor VS, labs, FS closely--   -- when FS improve, and patient remains stable, can resume home dose of metformin and januvia   -- Monitor for sx of Urosepsis--he is not febrile but temp was 99.6 earlier and he has a lubin catheter; WBC was normal yesterday--if he becomes febrile, will order blood and urine cultures, UA, get CBC, CMP, and start ABX   -- continue lubin care q8h and more often if needed 72 y/o M with pmh of DM 2, osteomyelitis, gerd, bipolar depression, HLD, HTN presented to the ED on 8/24/21 for altered mental status.  On presentation the patient had status epilepticus and he received 4 mg of lorazepam.   CT brain was done and an acute stroke could not be excluded because of technical difficulties.  His blood glucose was found to be more than 600.  Pt was admitted to ICU with DKA/HHS. Had seizures consistent with tonic-clonic seizure x2 in ED on 8/24. Pt was loaded with 3g Keppra and transferred to MICU where he was started on an insulin drip. He was subsequently intubated for airway protection and started on Versed gtt. Course was complicated by respiratory failure, pneumonia, encephalopathy, metabolic derangements. A Lubin Catheter was placed for urinary retention. He was treated with Vanco and Aztreonam/cefepime/zosyn for presumed aspiration pneumonia from intubation.   Tox screen was positive for Benzos, though he denies taking drugs. Pt was extubated on 8/31. Pt was placed on VEEG which was negative for seizure. However, Vimpat was added to Keppra regimen due to changes on EEG. MRI on 8/26 revealed punctate acute infarcts involving L occipital cortex. CT Head on 9/3 revealed effusions consistent with mastoiditis. A Loop Recorder was placed on 9/8/21.  His ex-wife said that he has Bipolar Depression and he was on a medication, although this is not found in his record. She also does not recall his meds. He is taking Zyprexa 5mg po q hs in the hospital.    At home he was taking Lantus and Janumet; A1C = 5.7 so he was previously well-controlled.  He was admitted to Rehab medicine service on 9/9/21.  His FS have been running quite high on current insulin regimen--Lantus 20 units sc q HS and lispro 4 units SC q AC.  Insulin was increased today to 25 units Lantus sc q hs and Lispro was increased to 7 units SC q AC.  His magnesium is also low, on 9/9 = 1.7; per Neuro, Mg++ should be kept >2; Mg++=1.9 on 9/10;  will start po mag oxide 400mg TID with meals.  Check all labs on Monday 9/13.    Allergies: NKA    MEDICATIONS  (STANDING):  ALBUTerol    90 MICROgram(s) HFA Inhaler 2 Puff(s) Inhalation every 6 hours  aspirin  chewable 81 milliGRAM(s) Oral daily  atorvastatin 40 milliGRAM(s) Oral at bedtime  chlorhexidine 4% Liquid 1 Application(s) Topical <User Schedule>  clopidogrel Tablet 75 milliGRAM(s) Oral daily  dextrose 40% Gel 15 Gram(s) Oral once  dextrose 5%. 1000 milliLiter(s) (50 mL/Hr) IV Continuous <Continuous>  dextrose 5%. 1000 milliLiter(s) (100 mL/Hr) IV Continuous <Continuous>  dextrose 50% Injectable 25 Gram(s) IV Push once  doxazosin 4 milliGRAM(s) Oral at bedtime  enoxaparin Injectable 40 milliGRAM(s) SubCutaneous daily  glucagon  Injectable 1 milliGRAM(s) IntraMuscular once  insulin glargine Injectable (LANTUS) 25 Unit(s) SubCutaneous at bedtime  insulin lispro (ADMELOG) corrective regimen sliding scale   SubCutaneous three times a day before meals  insulin lispro Injectable (ADMELOG) 7 Unit(s) SubCutaneous three times a day before meals  lacosamide 100 milliGRAM(s) Oral every 12 hours  levETIRAcetam 1000 milliGRAM(s) Oral two times a day  lisinopril 10 milliGRAM(s) Oral daily  magnesium oxide 400 milliGRAM(s) Oral three times a day with meals  OLANZapine 5 milliGRAM(s) Oral at bedtime  pantoprazole   Suspension 40 milliGRAM(s) Oral daily  senna 2 Tablet(s) Oral at bedtime  tiotropium 18 MICROgram(s) Capsule 1 Capsule(s) Inhalation daily    MEDICATIONS  (PRN):  acetaminophen   Tablet .. 650 milliGRAM(s) Oral every 6 hours PRN Temp greater or equal to 38C (100.4F), Mild Pain (1 - 3)  aluminum hydroxide/magnesium hydroxide/simethicone Suspension 30 milliLiter(s) Oral every 4 hours PRN Dyspepsia  benzocaine 20% Spray 1 Spray(s) Topical every 6 hours PRN sore throat  labetalol Injectable 10 milliGRAM(s) IV Push every 6 hours PRN Systolic blood pressure >  magnesium hydroxide Suspension 30 milliLiter(s) Oral daily PRN Constipation  melatonin 3 milliGRAM(s) Oral at bedtime PRN Insomnia    Vital Signs Last 24 Hrs  T(C): 37.6 (09-11-21 @ 13:19), Max: 37.6 (09-11-21 @ 13:19)  T(F): 99.6 (09-11-21 @ 13:19), Max: 99.6 (09-11-21 @ 13:19)  HR: 91 (09-11-21 @ 13:19) (79 - 91)  BP: 92/51 (09-11-21 @ 13:19) (80/46 - 133/88)  BP(mean): --  RR: 18 (09-11-21 @ 13:19) (18 - 18)  SpO2: --      LABS:              10.3   6.89  )-----------( 363      ( 10 Sep 2021 06:42 )             32.8     09-10    141  |  107  |  5<L>  ----------------------------<  282<H>  4.8   |  24  |  0.8    Ca    8.8      10 Sep 2021 06:42  Mg     1.9     09-10    TPro  5.9<L>  /  Alb  3.5  /  TBili  0.4  /  DBili  x   /  AST  24  /  ALT  27  /  AlkPhos  136<H>  09-10  TSH = 1.18 normal  B12 = 975  normal  folate = 17.5   normal  A1C = 5.7  COVID negative 9/6       -- Continue to monitor VS, labs, FS closely--   -- BP was low x2 today--decreased lisinopril from 10mg to 5mg po daily; if low BP persists, may need to decrease dose of Cardura to 2 mg po q hs (on it for urinary retention) or even d/c Cardura   -- when FS improve, and patient remains stable, can resume home dose of metformin and januvia   -- Monitor for sx of Urosepsis--he is not febrile but temp was 99.6 earlier and he has a lubin catheter; WBC was normal yesterday--if he becomes febrile, will order blood and urine cultures, UA, get CBC, CMP, and start ABX   -- continue lubin care q8h and more often if needed

## 2021-09-11 NOTE — PROGRESS NOTE ADULT - SUBJECTIVE AND OBJECTIVE BOX
T(C): 37.2 (09-11-21 @ 05:43), Max: 37.2 (09-11-21 @ 05:43)  HR: 79 (09-11-21 @ 05:43) (79 - 91)  BP: 117/58 (09-11-21 @ 05:43) (117/58 - 175/79)  RR: 18 (09-11-21 @ 05:43) (18 - 18)  SpO2: --      Patient was stable overnight and expresses no new complaints     PE:    Alert   LUNGS- clear  COR- RRR  ABD- SOFT, NT  EXTR- w/o edema  NEURO- stable    09-10    141  |  107  |  5<L>  ----------------------------<  282<H>  4.8   |  24  |  0.8    Ca    8.8      10 Sep 2021 06:42  Mg     1.9     09-10    TPro  5.9<L>  /  Alb  3.5  /  TBili  0.4  /  DBili  x   /  AST  24  /  ALT  27  /  AlkPhos  136<H>  09-10                            10.3   6.89  )-----------( 363      ( 10 Sep 2021 06:42 )             32.8

## 2021-09-12 LAB
GLUCOSE BLDC GLUCOMTR-MCNC: 123 MG/DL — HIGH (ref 70–99)
GLUCOSE BLDC GLUCOMTR-MCNC: 170 MG/DL — HIGH (ref 70–99)
GLUCOSE BLDC GLUCOMTR-MCNC: 212 MG/DL — HIGH (ref 70–99)
GLUCOSE BLDC GLUCOMTR-MCNC: 213 MG/DL — HIGH (ref 70–99)

## 2021-09-12 RX ORDER — BENZOCAINE AND MENTHOL 5; 1 G/100ML; G/100ML
1 LIQUID ORAL EVERY 4 HOURS
Refills: 0 | Status: DISCONTINUED | OUTPATIENT
Start: 2021-09-12 | End: 2021-09-12

## 2021-09-12 RX ADMIN — LISINOPRIL 5 MILLIGRAM(S): 2.5 TABLET ORAL at 06:38

## 2021-09-12 RX ADMIN — LEVETIRACETAM 1000 MILLIGRAM(S): 250 TABLET, FILM COATED ORAL at 06:38

## 2021-09-12 RX ADMIN — CLOPIDOGREL BISULFATE 75 MILLIGRAM(S): 75 TABLET, FILM COATED ORAL at 11:45

## 2021-09-12 RX ADMIN — SENNA PLUS 2 TABLET(S): 8.6 TABLET ORAL at 21:55

## 2021-09-12 RX ADMIN — PANTOPRAZOLE SODIUM 40 MILLIGRAM(S): 20 TABLET, DELAYED RELEASE ORAL at 11:51

## 2021-09-12 RX ADMIN — MAGNESIUM OXIDE 400 MG ORAL TABLET 400 MILLIGRAM(S): 241.3 TABLET ORAL at 11:50

## 2021-09-12 RX ADMIN — Medication 1: at 11:43

## 2021-09-12 RX ADMIN — Medication 1 TABLET(S): at 19:47

## 2021-09-12 RX ADMIN — MAGNESIUM OXIDE 400 MG ORAL TABLET 400 MILLIGRAM(S): 241.3 TABLET ORAL at 18:36

## 2021-09-12 RX ADMIN — Medication 2: at 08:00

## 2021-09-12 RX ADMIN — CHLORHEXIDINE GLUCONATE 1 APPLICATION(S): 213 SOLUTION TOPICAL at 06:38

## 2021-09-12 RX ADMIN — Medication 7 UNIT(S): at 16:29

## 2021-09-12 RX ADMIN — MAGNESIUM OXIDE 400 MG ORAL TABLET 400 MILLIGRAM(S): 241.3 TABLET ORAL at 08:30

## 2021-09-12 RX ADMIN — ENOXAPARIN SODIUM 40 MILLIGRAM(S): 100 INJECTION SUBCUTANEOUS at 11:44

## 2021-09-12 RX ADMIN — Medication 7 UNIT(S): at 08:00

## 2021-09-12 RX ADMIN — LACOSAMIDE 100 MILLIGRAM(S): 50 TABLET ORAL at 19:47

## 2021-09-12 RX ADMIN — LACOSAMIDE 100 MILLIGRAM(S): 50 TABLET ORAL at 06:54

## 2021-09-12 RX ADMIN — OLANZAPINE 5 MILLIGRAM(S): 15 TABLET, FILM COATED ORAL at 21:55

## 2021-09-12 RX ADMIN — INSULIN GLARGINE 25 UNIT(S): 100 INJECTION, SOLUTION SUBCUTANEOUS at 21:55

## 2021-09-12 RX ADMIN — Medication 81 MILLIGRAM(S): at 11:45

## 2021-09-12 RX ADMIN — Medication 7 UNIT(S): at 11:43

## 2021-09-12 RX ADMIN — Medication 4 MILLIGRAM(S): at 21:55

## 2021-09-12 RX ADMIN — LEVETIRACETAM 1000 MILLIGRAM(S): 250 TABLET, FILM COATED ORAL at 18:36

## 2021-09-12 RX ADMIN — ATORVASTATIN CALCIUM 40 MILLIGRAM(S): 80 TABLET, FILM COATED ORAL at 21:55

## 2021-09-12 NOTE — PROGRESS NOTE ADULT - SUBJECTIVE AND OBJECTIVE BOX
Patient is a 71y old  Male who presents with a chief complaint of stroke (09 Sep 2021 15:18)    HPI:  History of Present Illness:   70 y/o M with pmh of DM 2, osteomyelitis, gerd, depression, HLD, HTN presenting to the ED for altered mental status.  The patient was doing well the night prior to admission, but on 8/24/21, was noted to be unable to talk and walk independently. As per his ex wife, the right sided then started shaking. She called 911.   On presentation the patient had status epilepticus and he received 4 mg of lorazepam.   CT brain was done and an acute stroke could not be excluded because of technical difficulties.  His blood glucose was found to be more than 600. He admits to eating 2 hot dogs, french fries and a milk shake prior to the episode, but does not remember being brought to the hospital.    Pt admitted to ICU with DKA/HHS. Had seizures consistent with tonic-clonic seizure x2 in ED on 8/24. Pt was loaded with 3g Keppra and transferred to MICU where he was started on an insulin drip. He was subsequently intubated for airway protection and started on Versed gtt. Course was complicated by respiratory failure, pneumonia, encephalopathy, metabolic derangements. A Jimenez Catheter was placed for urinary retention. He was treated with Vanco and Aztreonam for presumed aspiration pneumonia from intubation. Tox screen was positive for Benzos, though he denies taking drugs. Pt was extubated on 8/31. Pt was placed on VEEG which was negative for seizure. However, Vimpat was added to regime due to changes on EEG. MRI on 8/26 revealed punctate acute infarcts involving L occipital cortex. CT Head on 9/3 revealed effusions consistent with mastoiditis. A Loop Recorder was placed on 9/8/21.  His ex-wife said that he has Bipolar Depression and he was on a medication, although this is not found in his record. She also does not recall his meds.    He was seen by PT and OT on the medical service. He requires min assistance to transfer bed to chair and CG to ambulate short distances and requires mod to max assistance for most ADL activities. He was evaluated by me on the medical service and was found to be a good acute inpatient rehab candidate.      TODAY'S SUBJECTIVE & REVIEW OF SYMPTOMS:  Patient seen and examined this AM   No acute complaints.  Voiding and passing stool spontaneously. T  Tolerating oral diet. Tolerating physical and occupational therapy. Therapies going well.   Vitals reviewed. Hemodynamically stable.     ROS   Constiutional:    [ x  ] WNL           [   ] poor appetite   [   ] insomnia   [   ] tired   ENT:                     [   ]                   [ x ]   voice change since intubation   Cardio:                [ x  ] WNL           [   ] CP   [   ] PHILLIPS   [   ] palpitations               Resp:                   [ x  ] WNL           [   ] SOB   [   ] cough   [   ] wheezing   GI:                        [ x  ] WNL           [   ] constipation   [   ] diarrhea   [   ] abdominal pain   [   ] nausea   [   ] emesis                                :                      [   ] WNL           [ x  ] JIMENEZ  [   ] dysuria   [   ] difficulty voiding             Endo:                   [ x  ] WNL          [   ] polyuria   [   ] temperature intolerance                 Skin:                     [ x  ] WNL          [   ] pain   [   ] wound   [   ] rash   MSK:                    [   ] WNL          [   ] muscle pain   [   ] joint pain/ stiffness   [   ] muscle tenderness   [   ] swelling  [ x ] multiple toe amputations right foot 10 years ago, 1st 2 toes left foot this year   Neuro:                 [  x ] WNL          [   ] HA   [   ] change in vision   [   ] tremor   [   ] weakness   [   ]dysphagia              Cognitive:           [ x  ] WNL           [   ]confusion      Psych:                  [   ] WNL           [   ] hallucinations   [   ]agitation   [   ] delusion   [ x  ] bipolar depression    PHYSICAL EXAM    Vital Signs Last 24 Hrs  T(C): 36.8 (12 Sep 2021 15:28), Max: 36.9 (12 Sep 2021 05:45)  T(F): 98.2 (12 Sep 2021 15:28), Max: 98.4 (12 Sep 2021 05:45)  HR: 86 (12 Sep 2021 15:28) (75 - 86)  BP: 129/62 (12 Sep 2021 15:28) (126/63 - 138/55)  BP(mean): --  RR: 18 (12 Sep 2021 15:28) (18 - 19)  SpO2: 98% (11 Sep 2021 22:45) (98% - 98%)    General:  [ x  ] WD/ WN male, NAD, Resting Comfortable,   [   ] other:                                HEENT: [  x ] NC/AT, EOMI, PERRL , Normal Conjunctivae,   [   ] other:  Cardio: [ x  ] RRR, no murmer,   [   ] other:                              Pulm: [ x  ] No Respiratory Distress,  Lungs CTAB,   [   ] other:                       Abdomen: [ x  ]ND/NT, Soft,   [   ] other:    : [   ] NO JIMENEZ CATHETER, [ x ] JIMENEZ CATHETER- no meatal tear, no discharge, [   ] other:                                            MSK: [ x  ] No joint swelling, Full ROM,   [  x ] other:   s/p amputation all toes right foot, 1st two toes left foot                                    Ext: [ x  ]No C/C/E, No calf tenderness,   [   ]other:    Skin: [ x  ]intact,   [   ] other:                                                                   Neurological Examination:  Cognitive: [  x  ] AAO x 3,   [    ]  other:                                                                      Attention:  [    ] intact,   [  x  ]  other:  needs redirection, tangential                                                                                            Communication: [  x  ]Fluent, no dysarthria, following commands:  [  x  ] other: hypophonic, tangential speech, redirectable  CN II - XII:  [  x  ] intact,  [    ] other:                                                                                        Motor:   RIGHT UE: [ x  ] WNL,  [   ] other:  LEFT    UE: [ x  ] WNL,  [   ] other:  RIGHT LE: [ x  ] WNL,  [   ] other:   LEFT    LE: [ x  ] WNL,  [   ] other:    Tone: [  x  ] wnl,   [    ]  other:  DTRs: [  x ]symmetric, [   ] other:  Coordination:   [   x ] intact,   [    ] other:                                                                           Sensory: [    ] Intact to light touch,   [  x  ] other:  decreased in a stocking-glove distribution    Medications   MEDICATIONS  (STANDING):  ALBUTerol    90 MICROgram(s) HFA Inhaler 2 Puff(s) Inhalation every 6 hours  aspirin  chewable 81 milliGRAM(s) Oral daily  atorvastatin 40 milliGRAM(s) Oral at bedtime  chlorhexidine 4% Liquid 1 Application(s) Topical <User Schedule>  clopidogrel Tablet 75 milliGRAM(s) Oral daily  dextrose 40% Gel 15 Gram(s) Oral once  dextrose 5%. 1000 milliLiter(s) (50 mL/Hr) IV Continuous <Continuous>  dextrose 5%. 1000 milliLiter(s) (100 mL/Hr) IV Continuous <Continuous>  dextrose 50% Injectable 25 Gram(s) IV Push once  doxazosin 4 milliGRAM(s) Oral at bedtime  enoxaparin Injectable 40 milliGRAM(s) SubCutaneous daily  glucagon  Injectable 1 milliGRAM(s) IntraMuscular once  insulin glargine Injectable (LANTUS) 25 Unit(s) SubCutaneous at bedtime  insulin lispro (ADMELOG) corrective regimen sliding scale   SubCutaneous three times a day before meals  insulin lispro Injectable (ADMELOG) 7 Unit(s) SubCutaneous three times a day before meals  lacosamide 100 milliGRAM(s) Oral every 12 hours  levETIRAcetam 1000 milliGRAM(s) Oral two times a day  lisinopril 5 milliGRAM(s) Oral daily  magnesium oxide 400 milliGRAM(s) Oral three times a day with meals  multivitamin 1 Tablet(s) Oral daily  OLANZapine 5 milliGRAM(s) Oral at bedtime  pantoprazole   Suspension 40 milliGRAM(s) Oral daily  senna 2 Tablet(s) Oral at bedtime  tiotropium 18 MICROgram(s) Capsule 1 Capsule(s) Inhalation daily    MEDICATIONS  (PRN):  acetaminophen   Tablet .. 650 milliGRAM(s) Oral every 6 hours PRN Temp greater or equal to 38C (100.4F), Mild Pain (1 - 3)  aluminum hydroxide/magnesium hydroxide/simethicone Suspension 30 milliLiter(s) Oral every 4 hours PRN Dyspepsia  benzocaine 20% Spray 1 Spray(s) Topical every 6 hours PRN sore throat  labetalol Injectable 10 milliGRAM(s) IV Push every 6 hours PRN Systolic blood pressure >  magnesium hydroxide Suspension 30 milliLiter(s) Oral daily PRN Constipation  melatonin 3 milliGRAM(s) Oral at bedtime PRN Insomnia        RECENT LABS/IMAGING                        10.3   6.89  )-----------( 363      ( 10 Sep 2021 06:42 )             32.8     09-10    141  |  107  |  5<L>  ----------------------------<  282<H>  4.8   |  24  |  0.8    Ca    8.8      10 Sep 2021 06:42  Mg     1.9     09-10    TPro  5.9<L>  /  Alb  3.5  /  TBili  0.4  /  DBili  x   /  AST  24  /  ALT  27  /  AlkPhos  136<H>  09-10    CAPILLARY BLOOD GLUCOSE      POCT Blood Glucose.: 170 mg/dL (12 Sep 2021 11:20)  POCT Blood Glucose.: 213 mg/dL (12 Sep 2021 07:22)  POCT Blood Glucose.: 177 mg/dL (11 Sep 2021 23:28)  POCT Blood Glucose.: 294 mg/dL (11 Sep 2021 16:09)

## 2021-09-12 NOTE — PROGRESS NOTE ADULT - ASSESSMENT
ASSESSMENT/PLAN    Rehab of Acute Left Occipital Infarcts, Metabolic Encephalopathy, with gait disorder and mental status changes. Good acute rehab candidate. Requires acute inpatient rehab and hospital setting to monitor blood sugars, monitor for further seizure activity and monitor mental status with Neurology f/u as needed.  - Patient requires 3 hrs daily of interdisciplinary inpatient rehab at least 5 days a week.   - c/w aspirin   - c/w plavix     S/P Status Epilepticus  - continue Lacosamide 100 mg bid and levetiracetam 1000 mg po bid   - Monitor    DM 2, s/p DKA, HHS  - fair control on insulin  - glargine 20 units   - lispro 4 units TID AC   - sliding scale prn   - noncompliant at home  - monitor FS glucose and adjust insulin    s/p Pneumonia, s/p Respiratory Failure  - likely aspiration  - s/p antibiotics    Urinary Retention  - Basilio Catheter in place  - TOV when ambulatory    HTN  - monitor on current meds    Diabetic Neuropathy    History of Osteomyelitis, likely Diabetic Ulcers  - s/p remote amputations right toes, and recently left 1st and 2nd toes    History of Bipolar Depression  - Neuropsych Eval  - c/w olanzapine 5 mg po qhs   - cooperating with therapies, though refuses meds or labs at times  - Psychiatry consult if unstable    Anemia  - Likely chronic disease.   - iron 54   - TIBC 145  - Fe sat 37%    HLD  - Atorvastatin    Maintenance   GI - pantoprazole  BM - senna 2 tabs po qhs, magnesium hydroxide 30 ml po qd prn for constipation   sleep- melatonin 3 mg po qhs prn for insomnia   -FEN - monitor lytes, intake  - Diet: soft, DASH, carb controlled    Precautions / PROPHYLAXIS:    - Falls  - Ortho: Weight bearing status: WBAT  - DVT prophylaxis:  Lovenox

## 2021-09-13 LAB
ALBUMIN SERPL ELPH-MCNC: 3.4 G/DL — LOW (ref 3.5–5.2)
ALP SERPL-CCNC: 114 U/L — SIGNIFICANT CHANGE UP (ref 30–115)
ALT FLD-CCNC: 16 U/L — SIGNIFICANT CHANGE UP (ref 0–41)
ANION GAP SERPL CALC-SCNC: 11 MMOL/L — SIGNIFICANT CHANGE UP (ref 7–14)
AST SERPL-CCNC: 13 U/L — SIGNIFICANT CHANGE UP (ref 0–41)
BASOPHILS # BLD AUTO: 0.02 K/UL — SIGNIFICANT CHANGE UP (ref 0–0.2)
BASOPHILS NFR BLD AUTO: 0.3 % — SIGNIFICANT CHANGE UP (ref 0–1)
BILIRUB SERPL-MCNC: 0.3 MG/DL — SIGNIFICANT CHANGE UP (ref 0.2–1.2)
BUN SERPL-MCNC: 12 MG/DL — SIGNIFICANT CHANGE UP (ref 10–20)
CALCIUM SERPL-MCNC: 8.6 MG/DL — SIGNIFICANT CHANGE UP (ref 8.5–10.1)
CHLORIDE SERPL-SCNC: 104 MMOL/L — SIGNIFICANT CHANGE UP (ref 98–110)
CO2 SERPL-SCNC: 25 MMOL/L — SIGNIFICANT CHANGE UP (ref 17–32)
CREAT SERPL-MCNC: 0.9 MG/DL — SIGNIFICANT CHANGE UP (ref 0.7–1.5)
EOSINOPHIL # BLD AUTO: 0.09 K/UL — SIGNIFICANT CHANGE UP (ref 0–0.7)
EOSINOPHIL NFR BLD AUTO: 1.2 % — SIGNIFICANT CHANGE UP (ref 0–8)
GLUCOSE BLDC GLUCOMTR-MCNC: 115 MG/DL — HIGH (ref 70–99)
GLUCOSE BLDC GLUCOMTR-MCNC: 188 MG/DL — HIGH (ref 70–99)
GLUCOSE BLDC GLUCOMTR-MCNC: 207 MG/DL — HIGH (ref 70–99)
GLUCOSE BLDC GLUCOMTR-MCNC: 225 MG/DL — HIGH (ref 70–99)
GLUCOSE BLDC GLUCOMTR-MCNC: 233 MG/DL — HIGH (ref 70–99)
GLUCOSE SERPL-MCNC: 212 MG/DL — HIGH (ref 70–99)
HCT VFR BLD CALC: 30.6 % — LOW (ref 42–52)
HGB BLD-MCNC: 9.3 G/DL — LOW (ref 14–18)
IMM GRANULOCYTES NFR BLD AUTO: 0.8 % — HIGH (ref 0.1–0.3)
LYMPHOCYTES # BLD AUTO: 0.71 K/UL — LOW (ref 1.2–3.4)
LYMPHOCYTES # BLD AUTO: 9.1 % — LOW (ref 20.5–51.1)
MAGNESIUM SERPL-MCNC: 1.8 MG/DL — SIGNIFICANT CHANGE UP (ref 1.8–2.4)
MCHC RBC-ENTMCNC: 28.2 PG — SIGNIFICANT CHANGE UP (ref 27–31)
MCHC RBC-ENTMCNC: 30.4 G/DL — LOW (ref 32–37)
MCV RBC AUTO: 92.7 FL — SIGNIFICANT CHANGE UP (ref 80–94)
MONOCYTES # BLD AUTO: 0.58 K/UL — SIGNIFICANT CHANGE UP (ref 0.1–0.6)
MONOCYTES NFR BLD AUTO: 7.4 % — SIGNIFICANT CHANGE UP (ref 1.7–9.3)
NEUTROPHILS # BLD AUTO: 6.35 K/UL — SIGNIFICANT CHANGE UP (ref 1.4–6.5)
NEUTROPHILS NFR BLD AUTO: 81.2 % — HIGH (ref 42.2–75.2)
NRBC # BLD: 0 /100 WBCS — SIGNIFICANT CHANGE UP (ref 0–0)
PLATELET # BLD AUTO: 343 K/UL — SIGNIFICANT CHANGE UP (ref 130–400)
POTASSIUM SERPL-MCNC: 4.3 MMOL/L — SIGNIFICANT CHANGE UP (ref 3.5–5)
POTASSIUM SERPL-SCNC: 4.3 MMOL/L — SIGNIFICANT CHANGE UP (ref 3.5–5)
PROT SERPL-MCNC: 5.8 G/DL — LOW (ref 6–8)
RBC # BLD: 3.3 M/UL — LOW (ref 4.7–6.1)
RBC # FLD: 13.8 % — SIGNIFICANT CHANGE UP (ref 11.5–14.5)
SARS-COV-2 RNA SPEC QL NAA+PROBE: SIGNIFICANT CHANGE UP
SODIUM SERPL-SCNC: 140 MMOL/L — SIGNIFICANT CHANGE UP (ref 135–146)
WBC # BLD: 7.81 K/UL — SIGNIFICANT CHANGE UP (ref 4.8–10.8)
WBC # FLD AUTO: 7.81 K/UL — SIGNIFICANT CHANGE UP (ref 4.8–10.8)

## 2021-09-13 RX ORDER — MAGNESIUM OXIDE 400 MG ORAL TABLET 241.3 MG
400 TABLET ORAL
Refills: 0 | Status: DISCONTINUED | OUTPATIENT
Start: 2021-09-13 | End: 2021-09-21

## 2021-09-13 RX ADMIN — MAGNESIUM OXIDE 400 MG ORAL TABLET 400 MILLIGRAM(S): 241.3 TABLET ORAL at 08:20

## 2021-09-13 RX ADMIN — Medication 7 UNIT(S): at 07:51

## 2021-09-13 RX ADMIN — MAGNESIUM OXIDE 400 MG ORAL TABLET 400 MILLIGRAM(S): 241.3 TABLET ORAL at 12:45

## 2021-09-13 RX ADMIN — Medication 7 UNIT(S): at 16:33

## 2021-09-13 RX ADMIN — LEVETIRACETAM 1000 MILLIGRAM(S): 250 TABLET, FILM COATED ORAL at 05:56

## 2021-09-13 RX ADMIN — CLOPIDOGREL BISULFATE 75 MILLIGRAM(S): 75 TABLET, FILM COATED ORAL at 11:28

## 2021-09-13 RX ADMIN — ENOXAPARIN SODIUM 40 MILLIGRAM(S): 100 INJECTION SUBCUTANEOUS at 12:46

## 2021-09-13 RX ADMIN — Medication 2: at 11:36

## 2021-09-13 RX ADMIN — LISINOPRIL 5 MILLIGRAM(S): 2.5 TABLET ORAL at 05:56

## 2021-09-13 RX ADMIN — OLANZAPINE 5 MILLIGRAM(S): 15 TABLET, FILM COATED ORAL at 21:41

## 2021-09-13 RX ADMIN — Medication 650 MILLIGRAM(S): at 11:24

## 2021-09-13 RX ADMIN — PANTOPRAZOLE SODIUM 40 MILLIGRAM(S): 20 TABLET, DELAYED RELEASE ORAL at 11:29

## 2021-09-13 RX ADMIN — Medication 1 TABLET(S): at 11:23

## 2021-09-13 RX ADMIN — Medication 81 MILLIGRAM(S): at 11:23

## 2021-09-13 RX ADMIN — CHLORHEXIDINE GLUCONATE 1 APPLICATION(S): 213 SOLUTION TOPICAL at 05:53

## 2021-09-13 RX ADMIN — Medication 650 MILLIGRAM(S): at 11:54

## 2021-09-13 RX ADMIN — ATORVASTATIN CALCIUM 40 MILLIGRAM(S): 80 TABLET, FILM COATED ORAL at 21:41

## 2021-09-13 RX ADMIN — MAGNESIUM OXIDE 400 MG ORAL TABLET 400 MILLIGRAM(S): 241.3 TABLET ORAL at 21:41

## 2021-09-13 RX ADMIN — LACOSAMIDE 100 MILLIGRAM(S): 50 TABLET ORAL at 05:56

## 2021-09-13 RX ADMIN — INSULIN GLARGINE 25 UNIT(S): 100 INJECTION, SOLUTION SUBCUTANEOUS at 21:41

## 2021-09-13 RX ADMIN — LEVETIRACETAM 1000 MILLIGRAM(S): 250 TABLET, FILM COATED ORAL at 18:41

## 2021-09-13 RX ADMIN — Medication 2: at 07:52

## 2021-09-13 RX ADMIN — Medication 4 MILLIGRAM(S): at 21:41

## 2021-09-13 RX ADMIN — ALBUTEROL 2 PUFF(S): 90 AEROSOL, METERED ORAL at 23:49

## 2021-09-13 RX ADMIN — LACOSAMIDE 100 MILLIGRAM(S): 50 TABLET ORAL at 18:41

## 2021-09-13 RX ADMIN — Medication 7 UNIT(S): at 11:36

## 2021-09-13 NOTE — PROGRESS NOTE ADULT - SUBJECTIVE AND OBJECTIVE BOX
Patient is a 71y old  Male who presents with a chief complaint of stroke (09 Sep 2021 15:18)    HPI:  History of Present Illness:   70 y/o M with pmh of DM 2, osteomyelitis, gerd, depression, HLD, HTN presenting to the ED for altered mental status.  The patient was doing well the night prior to admission, but on 8/24/21, was noted to be unable to talk and walk independently. As per his ex wife, the right sided then started shaking. She called 911.   On presentation the patient had status epilepticus and he received 4 mg of lorazepam.   CT brain was done and an acute stroke could not be excluded because of technical difficulties.  His blood glucose was found to be more than 600. He admits to eating 2 hot dogs, french fries and a milk shake prior to the episode, but does not remember being brought to the hospital.    Pt admitted to ICU with DKA/HHS. Had seizures consistent with tonic-clonic seizure x2 in ED on 8/24. Pt was loaded with 3g Keppra and transferred to MICU where he was started on an insulin drip. He was subsequently intubated for airway protection and started on Versed gtt. Course was complicated by respiratory failure, pneumonia, encephalopathy, metabolic derangements. A Jimenez Catheter was placed for urinary retention. He was treated with Vanco and Aztreonam for presumed aspiration pneumonia from intubation. Tox screen was positive for Benzos, though he denies taking drugs. Pt was extubated on 8/31. Pt was placed on VEEG which was negative for seizure. However, Vimpat was added to regime due to changes on EEG. MRI on 8/26 revealed punctate acute infarcts involving L occipital cortex. CT Head on 9/3 revealed effusions consistent with mastoiditis. A Loop Recorder was placed on 9/8/21.  His ex-wife said that he has Bipolar Depression and he was on a medication, although this is not found in his record. She also does not recall his meds.    He was seen by PT and OT on the medical service. He requires min assistance to transfer bed to chair and CG to ambulate short distances and requires mod to max assistance for most ADL activities. He was evaluated by me on the medical service and was found to be a good acute inpatient rehab candidate.      TODAY'S SUBJECTIVE & REVIEW OF SYMPTOMS:  Patient seen and examined this AM with Dr Matta. no new complaints Pt stable     ROS   Constiutional:    [ x  ] WNL           [   ] poor appetite   [   ] insomnia   [   ] tired   ENT:                     [   ]                   [ x ]   voice change since intubation   Cardio:                [ x  ] WNL           [   ] CP   [   ] PHILLIPS   [   ] palpitations               Resp:                   [ x  ] WNL           [   ] SOB   [   ] cough   [   ] wheezing   GI:                        [ x  ] WNL           [   ] constipation   [   ] diarrhea   [   ] abdominal pain   [   ] nausea   [   ] emesis                                :                      [   ] WNL           [ x  ] JIMENEZ  [   ] dysuria   [   ] difficulty voiding             Endo:                   [ x  ] WNL          [   ] polyuria   [   ] temperature intolerance                 Skin:                     [ x  ] WNL          [   ] pain   [   ] wound   [   ] rash   MSK:                    [   ] WNL          [   ] muscle pain   [   ] joint pain/ stiffness   [   ] muscle tenderness   [   ] swelling  [ x ] multiple toe amputations right foot 10 years ago, 1st 2 toes left foot this year   Neuro:                 [  x ] WNL          [   ] HA   [   ] change in vision   [   ] tremor   [   ] weakness   [   ]dysphagia              Cognitive:           [ x  ] WNL           [   ]confusion      Psych:                  [   ] WNL           [   ] hallucinations   [   ]agitation   [   ] delusion   [ x  ] bipolar depression    PHYSICAL EXAM  Vital Signs Last 24 Hrs  T(C): 37.3 (13 Sep 2021 05:34), Max: 37.3 (13 Sep 2021 05:34)  T(F): 99.2 (13 Sep 2021 05:34), Max: 99.2 (13 Sep 2021 05:34)  HR: 74 (13 Sep 2021 05:34) (74 - 86)  BP: 124/60 (13 Sep 2021 05:34) (124/60 - 148/65)  BP(mean): --  RR: 18 (13 Sep 2021 05:34) (18 - 18)  SpO2: --    General:  [ x  ] WD/ WN male, NAD, Resting Comfortable,   [   ] other:                                HEENT: [  x ] NC/AT, EOMI, PERRL , Normal Conjunctivae,   [   ] other:  Cardio: [ x  ] RRR, no murmer,   [   ] other:                              Pulm: [ x  ] No Respiratory Distress,  Lungs CTAB,   [   ] other:                       Abdomen: [ x  ]ND/NT, Soft,   [   ] other:    : [   ] NO JIMENEZ CATHETER, [ x ] JIMENEZ CATHETER- no meatal tear, no discharge, [   ] other:                                            MSK: [ x  ] No joint swelling, Full ROM,   [  x ] other:   s/p amputation all toes right foot, 1st two toes left foot                                    Ext: [ x  ]No C/C/E, No calf tenderness,   [   ]other:    Skin: [ x  ]intact,   [   ] other:                                                                   Neurological Examination:  Cognitive: [  x  ] AAO x 3,   [    ]  other:                                                                      Attention:  [    ] intact,   [  x  ]  other:  needs redirection, tangential                                                                                            Communication: [  x  ]Fluent, no dysarthria, following commands:  [  x  ] other: hypophonic, tangential speech, redirectable  CN II - XII:  [  x  ] intact,  [    ] other:                                                                                        Motor:   RIGHT UE: [ x  ] WNL,  [   ] other:  LEFT    UE: [ x  ] WNL,  [   ] other:  RIGHT LE: [ x  ] WNL,  [   ] other:   LEFT    LE: [ x  ] WNL,  [   ] other:    Tone: [  x  ] wnl,   [    ]  other:  DTRs: [  x ]symmetric, [   ] other:  Coordination:   [   x ] intact,   [    ] other:                                                                           Sensory: [    ] Intact to light touch,   [  x  ] other:  decreased in a stocking-glove distribution    Medications   MEDICATIONS  (STANDING):  ALBUTerol    90 MICROgram(s) HFA Inhaler 2 Puff(s) Inhalation every 6 hours  aspirin  chewable 81 milliGRAM(s) Oral daily  atorvastatin 40 milliGRAM(s) Oral at bedtime  chlorhexidine 4% Liquid 1 Application(s) Topical <User Schedule>  clopidogrel Tablet 75 milliGRAM(s) Oral daily  dextrose 40% Gel 15 Gram(s) Oral once  dextrose 5%. 1000 milliLiter(s) (50 mL/Hr) IV Continuous <Continuous>  dextrose 5%. 1000 milliLiter(s) (100 mL/Hr) IV Continuous <Continuous>  dextrose 50% Injectable 25 Gram(s) IV Push once  doxazosin 4 milliGRAM(s) Oral at bedtime  enoxaparin Injectable 40 milliGRAM(s) SubCutaneous daily  glucagon  Injectable 1 milliGRAM(s) IntraMuscular once  insulin glargine Injectable (LANTUS) 25 Unit(s) SubCutaneous at bedtime  insulin lispro (ADMELOG) corrective regimen sliding scale   SubCutaneous three times a day before meals  insulin lispro Injectable (ADMELOG) 7 Unit(s) SubCutaneous three times a day before meals  lacosamide 100 milliGRAM(s) Oral every 12 hours  levETIRAcetam 1000 milliGRAM(s) Oral two times a day  lisinopril 5 milliGRAM(s) Oral daily  magnesium oxide 400 milliGRAM(s) Oral three times a day with meals  multivitamin 1 Tablet(s) Oral daily  OLANZapine 5 milliGRAM(s) Oral at bedtime  pantoprazole   Suspension 40 milliGRAM(s) Oral daily  senna 2 Tablet(s) Oral at bedtime  tiotropium 18 MICROgram(s) Capsule 1 Capsule(s) Inhalation daily    MEDICATIONS  (PRN):  acetaminophen   Tablet .. 650 milliGRAM(s) Oral every 6 hours PRN Temp greater or equal to 38C (100.4F), Mild Pain (1 - 3)  aluminum hydroxide/magnesium hydroxide/simethicone Suspension 30 milliLiter(s) Oral every 4 hours PRN Dyspepsia  benzocaine 20% Spray 1 Spray(s) Topical every 6 hours PRN sore throat  labetalol Injectable 10 milliGRAM(s) IV Push every 6 hours PRN Systolic blood pressure >  magnesium hydroxide Suspension 30 milliLiter(s) Oral daily PRN Constipation  melatonin 3 milliGRAM(s) Oral at bedtime PRN Insomnia          RECENT LABS/IMAGING  POCT Blood Glucose.: 225 mg/dL (09-13-21 @ 11:06)  POCT Blood Glucose.: 233 mg/dL (09-13-21 @ 07:28)  POCT Blood Glucose.: 212 mg/dL (09-12-21 @ 21:26)  POCT Blood Glucose.: 123 mg/dL (09-12-21 @ 16:26)  POCT Blood Glucose.: 170 mg/dL (09-12-21 @ 11:20)  POCT Blood Glucose.: 213 mg/dL (09-12-21 @ 07:22)  POCT Blood Glucose.: 177 mg/dL (09-11-21 @ 23:28)  POCT Blood Glucose.: 294 mg/dL (09-11-21 @ 16:09)  POCT Blood Glucose.: 285 mg/dL (09-11-21 @ 11:35)  POCT Blood Glucose.: 284 mg/dL (09-11-21 @ 07:38)  POCT Blood Glucose.: 255 mg/dL (09-10-21 @ 21:15)  POCT Blood Glucose.: 196 mg/dL (09-10-21 @ 16:15)    CLOF: supervision for bed mobility and transfets  set up to touch assist for ADLs  ambulation:  150 ft with RW  or 150 ft with SC and TA   Patient is a 71y old  Male who presents with a chief complaint of stroke (09 Sep 2021 15:18)    HPI:  History of Present Illness:   72 y/o M with pmh of DM 2, osteomyelitis, gerd, depression, HLD, HTN presenting to the ED for altered mental status.  The patient was doing well the night prior to admission, but on 8/24/21, was noted to be unable to talk and walk independently. As per his ex wife, the right sided then started shaking. She called 911.   On presentation the patient had status epilepticus and he received 4 mg of lorazepam.   CT brain was done and an acute stroke could not be excluded because of technical difficulties.  His blood glucose was found to be more than 600. He admits to eating 2 hot dogs, french fries and a milk shake prior to the episode, but does not remember being brought to the hospital.    Pt admitted to ICU with DKA/HHS. Had seizures consistent with tonic-clonic seizure x2 in ED on 8/24. Pt was loaded with 3g Keppra and transferred to MICU where he was started on an insulin drip. He was subsequently intubated for airway protection and started on Versed gtt. Course was complicated by respiratory failure, pneumonia, encephalopathy, metabolic derangements. A Jimenez Catheter was placed for urinary retention. He was treated with Vanco and Aztreonam for presumed aspiration pneumonia from intubation. Tox screen was positive for Benzos, though he denies taking drugs. Pt was extubated on 8/31. Pt was placed on VEEG which was negative for seizure. However, Vimpat was added to regime due to changes on EEG. MRI on 8/26 revealed punctate acute infarcts involving L occipital cortex. CT Head on 9/3 revealed effusions consistent with mastoiditis. A Loop Recorder was placed on 9/8/21.  His ex-wife said that he has Bipolar Depression and he was on a medication, although this is not found in his record. She also does not recall his meds.    He was seen by PT and OT on the medical service. He requires min assistance to transfer bed to chair and CG to ambulate short distances and requires mod to max assistance for most ADL activities. He was evaluated by me on the medical service and was found to be a good acute inpatient rehab candidate.      TODAY'S SUBJECTIVE & REVIEW OF SYMPTOMS:  Patient seen and examined this AM with Dr Matta. no new complaints Pt stable     ROS   Constiutional:    [ x  ] WNL           [   ] poor appetite   [   ] insomnia   [   ] tired   ENT:                     [   ]                   [ x ]   voice change since intubation   Cardio:                [ x  ] WNL           [   ] CP   [   ] PHILLIPS   [   ] palpitations               Resp:                   [ x  ] WNL           [   ] SOB   [   ] cough   [   ] wheezing   GI:                        [ x  ] WNL           [   ] constipation   [   ] diarrhea   [   ] abdominal pain   [   ] nausea   [   ] emesis                                :                      [   ] WNL           [ x  ] JIMENEZ  [   ] dysuria   [   ] difficulty voiding             Endo:                   [ x  ] WNL          [   ] polyuria   [   ] temperature intolerance                 Skin:                     [ x  ] WNL          [   ] pain   [   ] wound   [   ] rash   MSK:                    [   ] WNL          [   ] muscle pain   [   ] joint pain/ stiffness   [   ] muscle tenderness   [   ] swelling  [ x ] multiple toe amputations right foot 10 years ago, 1st 2 toes left foot this year   Neuro:                 [  x ] WNL          [   ] HA   [   ] change in vision   [   ] tremor   [   ] weakness   [   ]dysphagia              Cognitive:           [ x  ] WNL           [   ]confusion      Psych:                  [   ] WNL           [   ] hallucinations   [   ]agitation   [   ] delusion   [ x  ] bipolar depression    PHYSICAL EXAM  Vital Signs Last 24 Hrs  T(C): 37.3 (13 Sep 2021 05:34), Max: 37.3 (13 Sep 2021 05:34)  T(F): 99.2 (13 Sep 2021 05:34), Max: 99.2 (13 Sep 2021 05:34)  HR: 74 (13 Sep 2021 05:34) (74 - 86)  BP: 124/60 (13 Sep 2021 05:34) (124/60 - 148/65)  BP(mean): --  RR: 18 (13 Sep 2021 05:34) (18 - 18)  SpO2: --    General:  [ x  ] WD/ WN male, NAD, Resting Comfortable,   [   ] other:                                HEENT: [  x ] NC/AT, EOMI, PERRL , Normal Conjunctivae,   [   ] other:  Cardio: [ x  ] RRR, no murmer,   [   ] other:                              Pulm: [ x  ] No Respiratory Distress,  Lungs CTAB,   [   ] other:                       Abdomen: [ x  ]ND/NT, Soft,   [   ] other:    : [   ] NO JIMENEZ CATHETER, [ x ] JIMENEZ CATHETER- no meatal tear, no discharge, [   ] other:                                            MSK: [ x  ] No joint swelling, Full ROM,   [  x ] other:   s/p amputation all toes right foot, 1st two toes left foot                                    Ext: [ x  ]No C/C/E, No calf tenderness,   [   ]other:    Skin: [ x  ]intact,   [   ] other:                                                                   Neurological Examination:  Cognitive: [  x  ] AAO x 3,   [    ]  other:                                                                      Attention:  [    ] intact,   [  x  ]  other:  needs redirection, tangential                                                                                            Communication: [  x  ]Fluent, no dysarthria, following commands:  [  x  ] other: hypophonic, tangential speech, redirectable  CN II - XII:  [  x  ] intact,  [    ] other:                                                                                        Motor:   RIGHT UE: [ x  ] WNL,  [   ] other:  LEFT    UE: [ x  ] WNL,  [   ] other:  RIGHT LE: [ x  ] WNL,  [   ] other:   LEFT    LE: [ x  ] WNL,  [   ] other:    Tone: [  x  ] wnl,   [    ]  other:  DTRs: [  x ]symmetric, [   ] other:  Coordination:   [   x ] intact,   [    ] other:                                                                           Sensory: [    ] Intact to light touch,   [  x  ] other:  decreased in a stocking-glove distribution    Medications   MEDICATIONS  (STANDING):  ALBUTerol    90 MICROgram(s) HFA Inhaler 2 Puff(s) Inhalation every 6 hours  aspirin  chewable 81 milliGRAM(s) Oral daily  atorvastatin 40 milliGRAM(s) Oral at bedtime  chlorhexidine 4% Liquid 1 Application(s) Topical <User Schedule>  clopidogrel Tablet 75 milliGRAM(s) Oral daily  dextrose 40% Gel 15 Gram(s) Oral once  dextrose 5%. 1000 milliLiter(s) (50 mL/Hr) IV Continuous <Continuous>  dextrose 5%. 1000 milliLiter(s) (100 mL/Hr) IV Continuous <Continuous>  dextrose 50% Injectable 25 Gram(s) IV Push once  doxazosin 4 milliGRAM(s) Oral at bedtime  enoxaparin Injectable 40 milliGRAM(s) SubCutaneous daily  glucagon  Injectable 1 milliGRAM(s) IntraMuscular once  insulin glargine Injectable (LANTUS) 25 Unit(s) SubCutaneous at bedtime  insulin lispro (ADMELOG) corrective regimen sliding scale   SubCutaneous three times a day before meals  insulin lispro Injectable (ADMELOG) 7 Unit(s) SubCutaneous three times a day before meals  lacosamide 100 milliGRAM(s) Oral every 12 hours  levETIRAcetam 1000 milliGRAM(s) Oral two times a day  lisinopril 5 milliGRAM(s) Oral daily  magnesium oxide 400 milliGRAM(s) Oral three times a day with meals  multivitamin 1 Tablet(s) Oral daily  OLANZapine 5 milliGRAM(s) Oral at bedtime  pantoprazole   Suspension 40 milliGRAM(s) Oral daily  senna 2 Tablet(s) Oral at bedtime  tiotropium 18 MICROgram(s) Capsule 1 Capsule(s) Inhalation daily    MEDICATIONS  (PRN):  acetaminophen   Tablet .. 650 milliGRAM(s) Oral every 6 hours PRN Temp greater or equal to 38C (100.4F), Mild Pain (1 - 3)  aluminum hydroxide/magnesium hydroxide/simethicone Suspension 30 milliLiter(s) Oral every 4 hours PRN Dyspepsia  benzocaine 20% Spray 1 Spray(s) Topical every 6 hours PRN sore throat  labetalol Injectable 10 milliGRAM(s) IV Push every 6 hours PRN Systolic blood pressure >  magnesium hydroxide Suspension 30 milliLiter(s) Oral daily PRN Constipation  melatonin 3 milliGRAM(s) Oral at bedtime PRN Insomnia          RECENT LABS/IMAGING  POCT Blood Glucose.: 225 mg/dL (09-13-21 @ 11:06)  POCT Blood Glucose.: 233 mg/dL (09-13-21 @ 07:28)  POCT Blood Glucose.: 212 mg/dL (09-12-21 @ 21:26)  POCT Blood Glucose.: 123 mg/dL (09-12-21 @ 16:26)  POCT Blood Glucose.: 170 mg/dL (09-12-21 @ 11:20)  POCT Blood Glucose.: 213 mg/dL (09-12-21 @ 07:22)  POCT Blood Glucose.: 177 mg/dL (09-11-21 @ 23:28)  POCT Blood Glucose.: 294 mg/dL (09-11-21 @ 16:09)  POCT Blood Glucose.: 285 mg/dL (09-11-21 @ 11:35)  POCT Blood Glucose.: 284 mg/dL (09-11-21 @ 07:38)  POCT Blood Glucose.: 255 mg/dL (09-10-21 @ 21:15)  POCT Blood Glucose.: 196 mg/dL (09-10-21 @ 16:15)    CLOF: supervision for bed mobility and transfets  set up to touch assist for ADLs  ambulation:  150 ft with RW CG

## 2021-09-13 NOTE — PROGRESS NOTE ADULT - ASSESSMENT
ASSESSMENT/PLAN    Rehab of Acute Left Occipital Infarcts, Metabolic Encephalopathy, with gait disorder and mental status changes. Good acute rehab candidate. Requires acute inpatient rehab and hospital setting to monitor blood sugars, monitor for further seizure activity and monitor mental status with Neurology f/u as needed.  - Patient requires 3 hrs daily of interdisciplinary inpatient rehab at least 5 days a week.   - c/w aspirin   - c/w plavix     S/P Status Epilepticus  - continue Lacosamide 100 mg bid and levetiracetam 1000 mg po bid   - Monitor    DM 2, s/p DKA, HHS  - fair control on insulin  - glargine 20 units   - lispro 4 units TID AC   - sliding scale prn   - noncompliant at home  - monitor FS glucose and adjust insulin    s/p Pneumonia, s/p Respiratory Failure  - likely aspiration  - s/p antibiotics    Urinary Retention  - Basilio Catheter in place  - TOV when ambulatory    HTN  - monitor on current meds    Diabetic Neuropathy    History of Osteomyelitis, likely Diabetic Ulcers  - s/p remote amputations right toes, and recently left 1st and 2nd toes    History of Bipolar Depression  - Neuropsych Eval  - c/w olanzapine 5 mg po qhs   - cooperating with therapies, though refuses meds or labs at times  - Psychiatry consult if unstable    Anemia  - Likely chronic disease.   - iron 54   - TIBC 145  - Fe sat 37%    HLD  - Atorvastatin    Maintenance   GI - pantoprazole  BM - senna 2 tabs po qhs, magnesium hydroxide 30 ml po qd prn for constipation   sleep- melatonin 3 mg po qhs prn for insomnia   -FEN - monitor lytes, intake  - Diet: soft, DASH, carb controlled    Precautions / PROPHYLAXIS:    - Falls  - Ortho: Weight bearing status: WBAT  - DVT prophylaxis:  Lovenox     ASSESSMENT/PLAN    Rehab of Acute Left Occipital Infarcts, Metabolic Encephalopathy s/p Status Epilepticus, with gait disorder and mental status changes. Good acute rehab candidate. Requires acute inpatient rehab and hospital setting to monitor blood sugars, monitor for further seizure activity and monitor mental status with Neurology f/u as needed.  - Patient requires 3 hrs daily of interdisciplinary inpatient rehab at least 5 days a week.   - c/w aspirin   - c/w plavix     S/P Status Epilepticus  - continue Lacosamide 100 mg bid and levetiracetam 1000 mg po bid   - Monitor  - no clinical sign of seizure. No recent facial twitching noted    DM 2, s/p DKA, HHS  - FS glucose in mid 200s on glargine 25 units and lispro 7 units TID AC   - sliding scale prn   - noncompliant at home  - Will look into home regimen (was on oral meds and HbA1C was controlled)  - Continue current insulin and SS for now    s/p Pneumonia, s/p Respiratory Failure  - likely aspiration  - s/p antibiotics    Urinary Retention  - Basilio Catheter in place  - walking 150 ft with CG now. Will D/C Basilio in am    HTN  - monitor on current meds  - controlled    Diabetic Neuropathy    History of Osteomyelitis, likely Diabetic Ulcers  - s/p remote amputations right toes, and recently left 1st and 2nd toes    History of Bipolar Depression  - Neuropsych Eval  - c/w olanzapine 5 mg po qhs   - cooperating with therapies, though refuses meds or labs at times  - Psychiatry consult if unstable    Anemia  - Likely chronic disease.   - iron 54   - TIBC 145  - Fe sat 37%    HLD  - Atorvastatin    Maintenance   GI - pantoprazole  BM - senna 2 tabs po qhs, magnesium hydroxide 30 ml po qd prn for constipation   sleep- melatonin 3 mg po qhs prn for insomnia   -FEN - monitor lytes, intake  - Diet: soft, DASH, carb controlled    Precautions / PROPHYLAXIS:    - Falls  - Ortho: Weight bearing status: WBAT  - DVT prophylaxis:  Lovenox

## 2021-09-13 NOTE — PROGRESS NOTE ADULT - ATTENDING COMMENTS
I reviewed the chart and examined the patient with the resident and we discussed the findings and treatment plan.  The patient is tolerating the rehab program well. I agree with the findings and treatment plan above, which I modified as indicated. The patient requires 3 hrs a day of acute inpatient rehab.   I read, edited and agree with the Assessment.   Rehab of Acute Left Occipital Infarcts, Metabolic Encephalopathy s/p Status Epilepticus, with gait disorder and mental status changes. Good acute rehab candidate. Requires acute inpatient rehab and hospital setting to monitor blood sugars, monitor for further seizure activity and monitor mental status with Neurology f/u as needed.  - Patient requires 3 hrs daily of interdisciplinary inpatient rehab at least 5 days a week.   - c/w aspirin   - c/w plavix     S/P Status Epilepticus  - continue Lacosamide 100 mg bid and levetiracetam 1000 mg po bid   - Monitor  - no clinical sign of seizure. No recent facial twitching noted    DM 2, s/p DKA, HHS  - FS glucose in mid 200s on glargine 25 units and lispro 7 units TID AC   - sliding scale prn   - noncompliant at home  - Will look into home regimen (was on oral meds and HbA1C was controlled)  - Continue current insulin and SS for now    s/p Pneumonia, s/p Respiratory Failure  - likely aspiration  - s/p antibiotics    Urinary Retention  - Basilio Catheter in place  - walking 150 ft with CG now. Will D/C Basilio in am    HTN  - monitor on current meds  - controlled    Diabetic Neuropathy    History of Osteomyelitis, likely Diabetic Ulcers  - s/p remote amputations right toes, and recently left 1st and 2nd toes    History of Bipolar Depression  - Neuropsych Eval  - c/w olanzapine 5 mg po qhs   - cooperating with therapies, though refuses meds or labs at times  - Psychiatry consult if unstable    Anemia  - Likely chronic disease.   - iron 54   - TIBC 145  - Fe sat 37%    HLD  - Atorvastatin    Maintenance   GI - pantoprazole  BM - senna 2 tabs po qhs, magnesium hydroxide 30 ml po qd prn for constipation   sleep- melatonin 3 mg po qhs prn for insomnia   -FEN - monitor lytes, intake  - Diet: soft, DASH, carb controlled    Precautions / PROPHYLAXIS:    - Falls  - Ortho: Weight bearing status: WBAT  - DVT prophylaxis:  Lovenox

## 2021-09-14 LAB
APPEARANCE UR: CLEAR — SIGNIFICANT CHANGE UP
BILIRUB UR-MCNC: NEGATIVE — SIGNIFICANT CHANGE UP
COLOR SPEC: SIGNIFICANT CHANGE UP
DIFF PNL FLD: NEGATIVE — SIGNIFICANT CHANGE UP
GLUCOSE BLDC GLUCOMTR-MCNC: 151 MG/DL — HIGH (ref 70–99)
GLUCOSE BLDC GLUCOMTR-MCNC: 225 MG/DL — HIGH (ref 70–99)
GLUCOSE BLDC GLUCOMTR-MCNC: 249 MG/DL — HIGH (ref 70–99)
GLUCOSE BLDC GLUCOMTR-MCNC: 270 MG/DL — HIGH (ref 70–99)
GLUCOSE UR QL: ABNORMAL
KETONES UR-MCNC: NEGATIVE — SIGNIFICANT CHANGE UP
LEUKOCYTE ESTERASE UR-ACNC: ABNORMAL
NITRITE UR-MCNC: NEGATIVE — SIGNIFICANT CHANGE UP
PH UR: 7.5 — SIGNIFICANT CHANGE UP (ref 5–8)
PROT UR-MCNC: SIGNIFICANT CHANGE UP
SP GR SPEC: 1.02 — SIGNIFICANT CHANGE UP (ref 1.01–1.03)
UROBILINOGEN FLD QL: SIGNIFICANT CHANGE UP

## 2021-09-14 RX ORDER — METFORMIN HYDROCHLORIDE 850 MG/1
1000 TABLET ORAL
Refills: 0 | Status: DISCONTINUED | OUTPATIENT
Start: 2021-09-14 | End: 2021-09-21

## 2021-09-14 RX ORDER — INSULIN GLARGINE 100 [IU]/ML
30 INJECTION, SOLUTION SUBCUTANEOUS AT BEDTIME
Refills: 0 | Status: DISCONTINUED | OUTPATIENT
Start: 2021-09-14 | End: 2021-09-21

## 2021-09-14 RX ADMIN — Medication 7 UNIT(S): at 12:57

## 2021-09-14 RX ADMIN — Medication 4 MILLIGRAM(S): at 21:46

## 2021-09-14 RX ADMIN — ALBUTEROL 2 PUFF(S): 90 AEROSOL, METERED ORAL at 19:52

## 2021-09-14 RX ADMIN — PANTOPRAZOLE SODIUM 40 MILLIGRAM(S): 20 TABLET, DELAYED RELEASE ORAL at 13:01

## 2021-09-14 RX ADMIN — Medication 3: at 17:42

## 2021-09-14 RX ADMIN — METFORMIN HYDROCHLORIDE 1000 MILLIGRAM(S): 850 TABLET ORAL at 17:45

## 2021-09-14 RX ADMIN — MAGNESIUM OXIDE 400 MG ORAL TABLET 400 MILLIGRAM(S): 241.3 TABLET ORAL at 21:46

## 2021-09-14 RX ADMIN — Medication 1: at 12:56

## 2021-09-14 RX ADMIN — MAGNESIUM OXIDE 400 MG ORAL TABLET 400 MILLIGRAM(S): 241.3 TABLET ORAL at 12:59

## 2021-09-14 RX ADMIN — ATORVASTATIN CALCIUM 40 MILLIGRAM(S): 80 TABLET, FILM COATED ORAL at 21:46

## 2021-09-14 RX ADMIN — Medication 81 MILLIGRAM(S): at 12:58

## 2021-09-14 RX ADMIN — MAGNESIUM OXIDE 400 MG ORAL TABLET 400 MILLIGRAM(S): 241.3 TABLET ORAL at 17:44

## 2021-09-14 RX ADMIN — LISINOPRIL 5 MILLIGRAM(S): 2.5 TABLET ORAL at 06:34

## 2021-09-14 RX ADMIN — LEVETIRACETAM 1000 MILLIGRAM(S): 250 TABLET, FILM COATED ORAL at 06:35

## 2021-09-14 RX ADMIN — CLOPIDOGREL BISULFATE 75 MILLIGRAM(S): 75 TABLET, FILM COATED ORAL at 13:03

## 2021-09-14 RX ADMIN — LACOSAMIDE 100 MILLIGRAM(S): 50 TABLET ORAL at 19:09

## 2021-09-14 RX ADMIN — LEVETIRACETAM 1000 MILLIGRAM(S): 250 TABLET, FILM COATED ORAL at 17:46

## 2021-09-14 RX ADMIN — Medication 7 UNIT(S): at 17:43

## 2021-09-14 RX ADMIN — Medication 7 UNIT(S): at 08:23

## 2021-09-14 RX ADMIN — OLANZAPINE 5 MILLIGRAM(S): 15 TABLET, FILM COATED ORAL at 21:47

## 2021-09-14 RX ADMIN — ALBUTEROL 2 PUFF(S): 90 AEROSOL, METERED ORAL at 14:35

## 2021-09-14 RX ADMIN — Medication 1 TABLET(S): at 13:00

## 2021-09-14 RX ADMIN — LACOSAMIDE 100 MILLIGRAM(S): 50 TABLET ORAL at 06:35

## 2021-09-14 RX ADMIN — MAGNESIUM OXIDE 400 MG ORAL TABLET 400 MILLIGRAM(S): 241.3 TABLET ORAL at 08:24

## 2021-09-14 RX ADMIN — ENOXAPARIN SODIUM 40 MILLIGRAM(S): 100 INJECTION SUBCUTANEOUS at 12:59

## 2021-09-14 RX ADMIN — ALBUTEROL 2 PUFF(S): 90 AEROSOL, METERED ORAL at 08:30

## 2021-09-14 RX ADMIN — Medication 2: at 08:21

## 2021-09-14 RX ADMIN — INSULIN GLARGINE 30 UNIT(S): 100 INJECTION, SOLUTION SUBCUTANEOUS at 21:47

## 2021-09-14 NOTE — PROGRESS NOTE ADULT - ATTENDING COMMENTS
I reviewed the chart and examined the patient with the resident and we discussed the findings and treatment plan.  The patient is tolerating the rehab program well. I agree with the findings and treatment plan above, which I modified as indicated. The patient requires 3 hrs a day of acute inpatient rehab. Doing well. Ambulates with CS with RW. Unsteady at times. VSS.  I read, edited and agree with the Assessment.   Rehab of Acute Left Occipital Infarcts, Metabolic Encephalopathy s/p Status Epilepticus, with gait disorder and mental status changes. Good acute rehab candidate. Requires acute inpatient rehab and hospital setting to monitor blood sugars, monitor for further seizure activity and monitor mental status with Neurology f/u as needed.  - Patient requires 3 hrs daily of interdisciplinary inpatient rehab at least 5 days a week.   - c/w aspirin   - c/w plavix     S/P Status Epilepticus  - continue Lacosamide 100 mg bid and levetiracetam 1000 mg po bid   - Monitor  - no clinical sign of seizure. No recent facial twitching noted    DM 2, s/p DKA, HHS  - FS glucose in mid 200s on glargine 25 units and lispro 7 units TID AC   - sliding scale prn   - lispro 7 u with meals    -glargine 30 qHs    s/p Pneumonia, s/p Respiratory Failure  - likely aspiration  - s/p antibiotics    Urinary Retention  - lubin out for voiding trial today -  in progress    HTN  - monitor on current meds  - controlled    Diabetic Neuropathy - stable    History of Osteomyelitis, likely Diabetic Ulcers  - s/p remote amputations right toes, and recently left 1st and 2nd toes    History of Bipolar Depression  - Neuropsych Eval  - c/w olanzapine 5 mg po qhs   - cooperating with therapies, though refuses meds or labs at times  - Psychiatry consult if unstable    Anemia  - chronic disease.   - iron 54   - TIBC 145  - Fe sat 37%    HLD  - Atorvastatin    Maintenance   GI - pantoprazole  BM - senna 2 tabs po qhs, magnesium hydroxide 30 ml po qd prn for constipation   sleep- melatonin 3 mg po qhs prn for insomnia   -FEN - monitor lytes, intake  - Diet: soft, DASH, carb controlled    Precautions / PROPHYLAXIS:    - Falls  - Ortho: Weight bearing status: WBAT  - DVT prophylaxis:  Lovenox

## 2021-09-14 NOTE — PROGRESS NOTE ADULT - SUBJECTIVE AND OBJECTIVE BOX
Patient is a 71y old  Male who presents with a chief complaint of stroke (09 Sep 2021 15:18)    HPI:  History of Present Illness:   72 y/o M with pmh of DM 2, osteomyelitis, gerd, depression, HLD, HTN presenting to the ED for altered mental status.  The patient was doing well the night prior to admission, but on 8/24/21, was noted to be unable to talk and walk independently. As per his ex wife, the right sided then started shaking. She called 911.   On presentation the patient had status epilepticus and he received 4 mg of lorazepam.   CT brain was done and an acute stroke could not be excluded because of technical difficulties.  His blood glucose was found to be more than 600. He admits to eating 2 hot dogs, french fries and a milk shake prior to the episode, but does not remember being brought to the hospital.    Pt admitted to ICU with DKA/HHS. Had seizures consistent with tonic-clonic seizure x2 in ED on 8/24. Pt was loaded with 3g Keppra and transferred to MICU where he was started on an insulin drip. He was subsequently intubated for airway protection and started on Versed gtt. Course was complicated by respiratory failure, pneumonia, encephalopathy, metabolic derangements. A Jimenez Catheter was placed for urinary retention. He was treated with Vanco and Aztreonam for presumed aspiration pneumonia from intubation. Tox screen was positive for Benzos, though he denies taking drugs. Pt was extubated on 8/31. Pt was placed on VEEG which was negative for seizure. However, Vimpat was added to regime due to changes on EEG. MRI on 8/26 revealed punctate acute infarcts involving L occipital cortex. CT Head on 9/3 revealed effusions consistent with mastoiditis. A Loop Recorder was placed on 9/8/21.  His ex-wife said that he has Bipolar Depression and he was on a medication, although this is not found in his record. She also does not recall his meds.    He was seen by PT and OT on the medical service. He requires min assistance to transfer bed to chair and CG to ambulate short distances and requires mod to max assistance for most ADL activities. He was evaluated by me on the medical service and was found to be a good acute inpatient rehab candidate.      TODAY'S SUBJECTIVE & REVIEW OF SYMPTOMS:  Patient seen and examined this AM. No new complaints Pt stable, voiding trail today.      ROS   Constiutional:    [ x  ] WNL           [   ] poor appetite   [   ] insomnia   [   ] tired   ENT:                     [   ]                   [ x ]   voice change since intubation   Cardio:                [ x  ] WNL           [   ] CP   [   ] PHILLIPS   [   ] palpitations               Resp:                   [ x  ] WNL           [   ] SOB   [   ] cough   [   ] wheezing   GI:                        [ x  ] WNL           [   ] constipation   [   ] diarrhea   [   ] abdominal pain   [   ] nausea   [   ] emesis                                :                      [   ] WNL           [ x  ] JIMENEZ  [   ] dysuria   [   ] difficulty voiding             Endo:                   [ x  ] WNL          [   ] polyuria   [   ] temperature intolerance                 Skin:                     [ x  ] WNL          [   ] pain   [   ] wound   [   ] rash   MSK:                    [   ] WNL          [   ] muscle pain   [   ] joint pain/ stiffness   [   ] muscle tenderness   [   ] swelling  [ x ] multiple toe amputations right foot 10 years ago, 1st 2 toes left foot this year   Neuro:                 [  x ] WNL          [   ] HA   [   ] change in vision   [   ] tremor   [   ] weakness   [   ]dysphagia              Cognitive:           [ x  ] WNL           [   ]confusion      Psych:                  [   ] WNL           [   ] hallucinations   [   ]agitation   [   ] delusion   [ x  ] bipolar depression    PHYSICAL EXAM  Vital Signs Last 24 Hrs  T(C): 37.1 (14 Sep 2021 05:12), Max: 37.1 (13 Sep 2021 22:35)  T(F): 98.8 (14 Sep 2021 05:12), Max: 98.8 (14 Sep 2021 05:12)  HR: 79 (14 Sep 2021 05:12) (73 - 79)  BP: 116/58 (14 Sep 2021 05:12) (116/58 - 150/67)  BP(mean): --  RR: 18 (14 Sep 2021 05:12) (18 - 18)  SpO2: --    General:  [ x  ] WD/ WN male, NAD, Resting Comfortable,   [   ] other:                                HEENT: [  x ] NC/AT, EOMI, PERRL , Normal Conjunctivae,   [   ] other:  Cardio: [ x  ] RRR, no murmer,   [   ] other:                              Pulm: [ x  ] No Respiratory Distress,  Lungs CTAB,   [   ] other:                       Abdomen: [ x  ]ND/NT, Soft,   [   ] other:    : [ x  ] NO JIMENEZ CATHETER, [ } JIMENEZ CATHETER- no meatal tear, no discharge, [   ] other:                                            MSK: [ x  ] No joint swelling, Full ROM,   [  x ] other:   s/p amputation all toes right foot, 1st two toes left foot                                    Ext: [ x  ]No C/C/E, No calf tenderness,   [   ]other:    Skin: [ x  ]intact,   [   ] other:                                                                   Neurological Examination:  Cognitive: [  x  ] AAO x 3,   [    ]  other:                                                                      Attention:  [    ] intact,   [  x  ]  other:  needs redirection, tangential                                                                                            Communication: [  x  ]Fluent, no dysarthria, following commands:  [  x  ] other: hypophonic, tangential speech, redirectable  CN II - XII:  [  x  ] intact,  [    ] other:                                                                                        Motor:   RIGHT UE: [ x  ] WNL,  [   ] other:  LEFT    UE: [ x  ] WNL,  [   ] other:  RIGHT LE: [ x  ] WNL,  [   ] other:   LEFT    LE: [ x  ] WNL,  [   ] other:    Tone: [  x  ] wnl,   [    ]  other:  DTRs: [  x ]symmetric, [   ] other:  Coordination:   [   x ] intact,   [    ] other:                                                                           Sensory: [    ] Intact to light touch,   [  x  ] other:  decreased in a stocking-glove distribution  MEDICATIONS  (STANDING):  ALBUTerol    90 MICROgram(s) HFA Inhaler 2 Puff(s) Inhalation every 6 hours  aspirin  chewable 81 milliGRAM(s) Oral daily  atorvastatin 40 milliGRAM(s) Oral at bedtime  chlorhexidine 4% Liquid 1 Application(s) Topical <User Schedule>  clopidogrel Tablet 75 milliGRAM(s) Oral daily  dextrose 40% Gel 15 Gram(s) Oral once  dextrose 5%. 1000 milliLiter(s) (50 mL/Hr) IV Continuous <Continuous>  dextrose 5%. 1000 milliLiter(s) (100 mL/Hr) IV Continuous <Continuous>  dextrose 50% Injectable 25 Gram(s) IV Push once  doxazosin 4 milliGRAM(s) Oral at bedtime  enoxaparin Injectable 40 milliGRAM(s) SubCutaneous daily  glucagon  Injectable 1 milliGRAM(s) IntraMuscular once  insulin glargine Injectable (LANTUS) 30 Unit(s) SubCutaneous at bedtime  insulin lispro (ADMELOG) corrective regimen sliding scale   SubCutaneous three times a day before meals  insulin lispro Injectable (ADMELOG) 7 Unit(s) SubCutaneous three times a day before meals  lacosamide 100 milliGRAM(s) Oral every 12 hours  levETIRAcetam 1000 milliGRAM(s) Oral two times a day  lisinopril 5 milliGRAM(s) Oral daily  magnesium oxide 400 milliGRAM(s) Oral <User Schedule>  metFORMIN 1000 milliGRAM(s) Oral two times a day with meals  multivitamin 1 Tablet(s) Oral daily  OLANZapine 5 milliGRAM(s) Oral at bedtime  pantoprazole   Suspension 40 milliGRAM(s) Oral daily  senna 2 Tablet(s) Oral at bedtime  sitaGLIPtin 50 milliGRAM(s) Oral <User Schedule>  tiotropium 18 MICROgram(s) Capsule 1 Capsule(s) Inhalation daily    MEDICATIONS  (PRN):  acetaminophen   Tablet .. 650 milliGRAM(s) Oral every 6 hours PRN Temp greater or equal to 38C (100.4F), Mild Pain (1 - 3)  aluminum hydroxide/magnesium hydroxide/simethicone Suspension 30 milliLiter(s) Oral every 4 hours PRN Dyspepsia  benzocaine 20% Spray 1 Spray(s) Topical every 6 hours PRN sore throat  labetalol Injectable 10 milliGRAM(s) IV Push every 6 hours PRN Systolic blood pressure >  magnesium hydroxide Suspension 30 milliLiter(s) Oral daily PRN Constipation  melatonin 3 milliGRAM(s) Oral at bedtime PRN Insomnia                          9.3    7.81  )-----------( 343      ( 13 Sep 2021 04:30 )             30.6     09-13    140  |  104  |  12  ----------------------------<  212<H>  4.3   |  25  |  0.9    Ca    8.6      13 Sep 2021 04:30  Mg     1.8     09-13    TPro  5.8<L>  /  Alb  3.4<L>  /  TBili  0.3  /  DBili  x   /  AST  13  /  ALT  16  /  AlkPhos  114  09-13        POCT Blood Glucose.: 151 mg/dL (09-14-21 @ 12:08)  POCT Blood Glucose.: 225 mg/dL (09-14-21 @ 07:21)  POCT Blood Glucose.: 207 mg/dL (09-13-21 @ 22:09)  POCT Blood Glucose.: 188 mg/dL (09-13-21 @ 20:40)  POCT Blood Glucose.: 115 mg/dL (09-13-21 @ 16:30)  POCT Blood Glucose.: 225 mg/dL (09-13-21 @ 11:06)  POCT Blood Glucose.: 233 mg/dL (09-13-21 @ 07:28)  POCT Blood Glucose.: 212 mg/dL (09-12-21 @ 21:26)  POCT Blood Glucose.: 123 mg/dL (09-12-21 @ 16:26)  POCT Blood Glucose.: 170 mg/dL (09-12-21 @ 11:20)  POCT Blood Glucose.: 213 mg/dL (09-12-21 @ 07:22)  POCT Blood Glucose.: 177 mg/dL (09-11-21 @ 23:28)    CLOF: supervision for bed mobility and transfets  set up to touch assist for ADLs, can shower himself   ambulation:  150 ft with RW supervision   Patient is a 71y old  Male who presents with a chief complaint of stroke (09 Sep 2021 15:18)    HPI:  History of Present Illness:   72 y/o M with pmh of DM 2, osteomyelitis, gerd, depression, HLD, HTN presenting to the ED for altered mental status.  The patient was doing well the night prior to admission, but on 8/24/21, was noted to be unable to talk and walk independently. As per his ex wife, the right sided then started shaking. She called 911.   On presentation the patient had status epilepticus and he received 4 mg of lorazepam.   CT brain was done and an acute stroke could not be excluded because of technical difficulties.  His blood glucose was found to be more than 600. He admits to eating 2 hot dogs, french fries and a milk shake prior to the episode, but does not remember being brought to the hospital.    Pt admitted to ICU with DKA/HHS. Had seizures consistent with tonic-clonic seizure x2 in ED on 8/24. Pt was loaded with 3g Keppra and transferred to MICU where he was started on an insulin drip. He was subsequently intubated for airway protection and started on Versed gtt. Course was complicated by respiratory failure, pneumonia, encephalopathy, metabolic derangements. A Jimenez Catheter was placed for urinary retention. He was treated with Vanco and Aztreonam for presumed aspiration pneumonia from intubation. Tox screen was positive for Benzos, though he denies taking drugs. Pt was extubated on 8/31. Pt was placed on VEEG which was negative for seizure. However, Vimpat was added to regime due to changes on EEG. MRI on 8/26 revealed punctate acute infarcts involving L occipital cortex. CT Head on 9/3 revealed effusions consistent with mastoiditis. A Loop Recorder was placed on 9/8/21.  His ex-wife said that he has Bipolar Depression and he was on a medication, although this is not found in his record. She also does not recall his meds.    He was seen by PT and OT on the medical service. He requires min assistance to transfer bed to chair and CG to ambulate short distances and requires mod to max assistance for most ADL activities. He was evaluated by me on the medical service and was found to be a good acute inpatient rehab candidate.      TODAY'S SUBJECTIVE & REVIEW OF SYMPTOMS:  Patient seen and examined this AM. No new complaints Pt stable, voiding trail today.      ROS   Constiutional:    [ x  ] WNL           [   ] poor appetite   [   ] insomnia   [   ] tired   ENT:                     [   ]                   [ x ]   voice change since intubation   Cardio:                [ x  ] WNL           [   ] CP   [   ] PHILLIPS   [   ] palpitations               Resp:                   [ x  ] WNL           [   ] SOB   [   ] cough   [   ] wheezing   GI:                        [ x  ] WNL           [   ] constipation   [   ] diarrhea   [   ] abdominal pain   [   ] nausea   [   ] emesis                                :                      [   ] WNL           [ x  ] JIMENEZ  [   ] dysuria   [   ] difficulty voiding             Endo:                   [ x  ] WNL          [   ] polyuria   [   ] temperature intolerance                 Skin:                     [ x  ] WNL          [   ] pain   [   ] wound   [   ] rash   MSK:                    [   ] WNL          [   ] muscle pain   [   ] joint pain/ stiffness   [   ] muscle tenderness   [   ] swelling  [ x ] multiple toe amputations right foot 10 years ago, 1st 2 toes left foot this year   Neuro:                 [  x ] WNL          [   ] HA   [   ] change in vision   [   ] tremor   [   ] weakness   [   ]dysphagia              Cognitive:           [ x  ] WNL           [   ]confusion      Psych:                  [   ] WNL           [   ] hallucinations   [   ]agitation   [   ] delusion   [ x  ] bipolar depression    PHYSICAL EXAM  Vital Signs Last 24 Hrs  T(C): 37.1 (14 Sep 2021 05:12), Max: 37.1 (13 Sep 2021 22:35)  T(F): 98.8 (14 Sep 2021 05:12), Max: 98.8 (14 Sep 2021 05:12)  HR: 79 (14 Sep 2021 05:12) (73 - 79)  BP: 116/58 (14 Sep 2021 05:12) (116/58 - 150/67)  BP(mean): --  RR: 18 (14 Sep 2021 05:12) (18 - 18)  SpO2: --    General:  [ x  ] WD/ WN male, NAD, Resting Comfortable,   [   ] other:                                HEENT: [  x ] NC/AT, EOMI, PERRL , Normal Conjunctivae,   [   ] other:  Cardio: [ x  ] RRR, no murmer,   [   ] other:                              Pulm: [ x  ] No Respiratory Distress,  Lungs CTAB,   [   ] other:                       Abdomen: [ x  ]ND/NT, Soft,   [   ] other:    : [ x  ] NO JIMENEZ CATHETER, [ } JIMENEZ CATHETER- no meatal tear, no discharge, [   ] other:                                            MSK: [ x  ] No joint swelling, Full ROM,   [  x ] other:   s/p amputation all toes right foot, 1st two toes left foot                                    Ext: [ x  ]No C/C/E, No calf tenderness,   [   ]other:    Skin: [ x  ]intact,   [   ] other:                                                                   Neurological Examination:  Cognitive: [  x  ] AAO x 3,   [    ]  other:                                                                      Attention:  [    ] intact,   [  x  ]  other:  needs redirection, tangential                                                                                            Communication: [  x  ]Fluent, no dysarthria, following commands:  [  x  ] other: hypophonic, tangential speech, redirectable  CN II - XII:  [  x  ] intact,  [    ] other:                                                                                        Motor:   RIGHT UE: [ x  ] WNL,  [   ] other:  LEFT    UE: [ x  ] WNL,  [   ] other:  RIGHT LE: [ x  ] WNL,  [   ] other:   LEFT    LE: [ x  ] WNL,  [   ] other:    Tone: [  x  ] wnl,   [    ]  other:  DTRs: [  x ]symmetric, [   ] other:  Coordination:   [   x ] intact,   [    ] other:                                                                           Sensory: [    ] Intact to light touch,   [  x  ] other:  decreased in a stocking-glove distribution  MEDICATIONS  (STANDING):  ALBUTerol    90 MICROgram(s) HFA Inhaler 2 Puff(s) Inhalation every 6 hours  aspirin  chewable 81 milliGRAM(s) Oral daily  atorvastatin 40 milliGRAM(s) Oral at bedtime  chlorhexidine 4% Liquid 1 Application(s) Topical <User Schedule>  clopidogrel Tablet 75 milliGRAM(s) Oral daily  dextrose 40% Gel 15 Gram(s) Oral once  dextrose 5%. 1000 milliLiter(s) (50 mL/Hr) IV Continuous <Continuous>  dextrose 5%. 1000 milliLiter(s) (100 mL/Hr) IV Continuous <Continuous>  dextrose 50% Injectable 25 Gram(s) IV Push once  doxazosin 4 milliGRAM(s) Oral at bedtime  enoxaparin Injectable 40 milliGRAM(s) SubCutaneous daily  glucagon  Injectable 1 milliGRAM(s) IntraMuscular once  insulin glargine Injectable (LANTUS) 30 Unit(s) SubCutaneous at bedtime  insulin lispro (ADMELOG) corrective regimen sliding scale   SubCutaneous three times a day before meals  insulin lispro Injectable (ADMELOG) 7 Unit(s) SubCutaneous three times a day before meals  lacosamide 100 milliGRAM(s) Oral every 12 hours  levETIRAcetam 1000 milliGRAM(s) Oral two times a day  lisinopril 5 milliGRAM(s) Oral daily  magnesium oxide 400 milliGRAM(s) Oral <User Schedule>  metFORMIN 1000 milliGRAM(s) Oral two times a day with meals  multivitamin 1 Tablet(s) Oral daily  OLANZapine 5 milliGRAM(s) Oral at bedtime  pantoprazole   Suspension 40 milliGRAM(s) Oral daily  senna 2 Tablet(s) Oral at bedtime  sitaGLIPtin 50 milliGRAM(s) Oral <User Schedule>  tiotropium 18 MICROgram(s) Capsule 1 Capsule(s) Inhalation daily    MEDICATIONS  (PRN):  acetaminophen   Tablet .. 650 milliGRAM(s) Oral every 6 hours PRN Temp greater or equal to 38C (100.4F), Mild Pain (1 - 3)  aluminum hydroxide/magnesium hydroxide/simethicone Suspension 30 milliLiter(s) Oral every 4 hours PRN Dyspepsia  benzocaine 20% Spray 1 Spray(s) Topical every 6 hours PRN sore throat  labetalol Injectable 10 milliGRAM(s) IV Push every 6 hours PRN Systolic blood pressure >  magnesium hydroxide Suspension 30 milliLiter(s) Oral daily PRN Constipation  melatonin 3 milliGRAM(s) Oral at bedtime PRN Insomnia                          9.3    7.81  )-----------( 343      ( 13 Sep 2021 04:30 )             30.6     09-13    140  |  104  |  12  ----------------------------<  212<H>  4.3   |  25  |  0.9    Ca    8.6      13 Sep 2021 04:30  Mg     1.8     09-13    TPro  5.8<L>  /  Alb  3.4<L>  /  TBili  0.3  /  DBili  x   /  AST  13  /  ALT  16  /  AlkPhos  114  09-13        POCT Blood Glucose.: 151 mg/dL (09-14-21 @ 12:08)  POCT Blood Glucose.: 225 mg/dL (09-14-21 @ 07:21)  POCT Blood Glucose.: 207 mg/dL (09-13-21 @ 22:09)  POCT Blood Glucose.: 188 mg/dL (09-13-21 @ 20:40)  POCT Blood Glucose.: 115 mg/dL (09-13-21 @ 16:30)  POCT Blood Glucose.: 225 mg/dL (09-13-21 @ 11:06)  POCT Blood Glucose.: 233 mg/dL (09-13-21 @ 07:28)  POCT Blood Glucose.: 212 mg/dL (09-12-21 @ 21:26)  POCT Blood Glucose.: 123 mg/dL (09-12-21 @ 16:26)  POCT Blood Glucose.: 170 mg/dL (09-12-21 @ 11:20)  POCT Blood Glucose.: 213 mg/dL (09-12-21 @ 07:22)  POCT Blood Glucose.: 177 mg/dL (09-11-21 @ 23:28)    CLOF: supervision for bed mobility and transfets  set up to touch assist for ADLs,   ambulation:  150 ft with RW close supervision

## 2021-09-14 NOTE — PROGRESS NOTE ADULT - ASSESSMENT
Primary Contact Name: Amber Ceja             Relationship: Daughter    Pertinent Social History: The daughter is noticing improvement since his admission into the hospital. She has noticed some new problems with short-term memory and slowed processing since his injury and admission. Mood has been positive in the hospital and he is hopeful for the future. Before the injury, the patient was living with his ex-wife. However, after discharge, the plan is for him to live with his daughter. She is confident that she can provide care for him as needed. She would also like him to continue with therapy, including psychotherapy, after his discharge from the inpatient unit.    Premorbid Functioning:  ADLs: Independent  IADLs: Independent  Psychiatric History/Treatment: Bipolar disorder with frequent depressive episodes. Not sure if he is taking medications for it. Unsure if he has attended therapy but suspects he has.  Previous Coping: Motorcycle and cars; he works on, restores them, and rides them.  Cognition: None.  Substance Use: None.    Current Status:  Mood Changes: ? Denied    Cognition Changes:  ? Yes (Memory and processing speed)  Behavior Changes: ? Denied    Patient’s Primary Language: English  a) Changes in understanding primary language after Neurological event. ? No     b) Preferred language for health care information? English  Family Education: Daughter was educated about the rehabilitation process, current status, and family education sessions. Discharge planning initiated.    Family Concerns: Cognition, notably memory and processing speed  Family Goals: Cognitive assessment and supporting mood/coping

## 2021-09-14 NOTE — CHART NOTE - NSCHARTNOTEFT_GEN_A_CORE
72 y/o M with pmh of DM 2, osteomyelitis, gerd, bipolar depression, HLD, HTN presented to the ED on 8/24/21 for altered mental status.  On presentation the patient had status epilepticus and he received 4 mg of lorazepam. MRI on 8/26 revealed punctate acute infarcts involving L occipital cortex. CT Head on 9/3 revealed effusions consistent with mastoiditis. A Loop Recorder was placed on 9/8/21.  His blood glucose was found to be more than 600 on admission.  Pt was admitted to ICU with DKA/HHS. Had seizures consistent with tonic-clonic seizure x2 in ED on 8/24. Pt was loaded with 3g Keppra and transferred to MICU where he was started on an insulin drip. He was subsequently intubated for airway protection and started on Versed gtt. Course was complicated by respiratory failure, pneumonia, encephalopathy, metabolic derangements. A Lubin Catheter was placed for urinary retention. He was treated with Vanco and Aztreonam/cefepime/zosyn for presumed aspiration pneumonia from intubation.   He is taking Keppra and Vimpat for seizures.    At home he was taking Lantus and Janumet and Trulicity; A1C = 5.7 so he was previously well-controlled.  FS glucoses have been running high since admission; he is currently getting Lantus 25 units SC q hs and Admelog 7 units SC q AC.  Now that he is more stable, his home dose of Januvia and Metformin will be resumed today (he takes janumet 50/1000 BID with meals) and will increase Lantus to his home dose of 30 units sc q HS.   Tomorrow will reduce Admelog back to 4 units sc q AC and once FS are better controlled, will stop prandial insulin.    MEDICATIONS  (STANDING):  ALBUTerol    90 MICROgram(s) HFA Inhaler 2 Puff(s) Inhalation every 6 hours  aspirin  chewable 81 milliGRAM(s) Oral daily  atorvastatin 40 milliGRAM(s) Oral at bedtime  chlorhexidine 4% Liquid 1 Application(s) Topical <User Schedule>  clopidogrel Tablet 75 milliGRAM(s) Oral daily  dextrose 40% Gel 15 Gram(s) Oral once  dextrose 5%. 1000 milliLiter(s) (50 mL/Hr) IV Continuous <Continuous>  dextrose 5%. 1000 milliLiter(s) (100 mL/Hr) IV Continuous <Continuous>  dextrose 50% Injectable 25 Gram(s) IV Push once  doxazosin 4 milliGRAM(s) Oral at bedtime  enoxaparin Injectable 40 milliGRAM(s) SubCutaneous daily  glucagon  Injectable 1 milliGRAM(s) IntraMuscular once  insulin glargine Injectable (LANTUS) 30 Unit(s) SubCutaneous at bedtime  insulin lispro (ADMELOG) corrective regimen sliding scale   SubCutaneous three times a day before meals  insulin lispro Injectable (ADMELOG) 7 Unit(s) SubCutaneous three times a day before meals (will decrease to 4 units SC q AC on 9/15, once januvia and metformin are resumed, and Lantus increased to 30 units)  lacosamide 100 milliGRAM(s) Oral every 12 hours  levETIRAcetam 1000 milliGRAM(s) Oral two times a day  lisinopril 5 milliGRAM(s) Oral daily  magnesium oxide 400 milliGRAM(s) Oral <User Schedule>  metFORMIN 1000 milliGRAM(s) Oral two times a day with meals  multivitamin 1 Tablet(s) Oral daily  OLANZapine 5 milliGRAM(s) Oral at bedtime  pantoprazole   Suspension 40 milliGRAM(s) Oral daily  senna 2 Tablet(s) Oral at bedtime  sitaGLIPtin 50 milliGRAM(s) Oral <User Schedule>twice a day with meals  tiotropium 18 MICROgram(s) Capsule 1 Capsule(s) Inhalation daily      MEDICATIONS  (PRN):  acetaminophen   Tablet .. 650 milliGRAM(s) Oral every 6 hours PRN Temp greater or equal to 38C (100.4F), Mild Pain (1 - 3)  aluminum hydroxide/magnesium hydroxide/simethicone Suspension 30 milliLiter(s) Oral every 4 hours PRN Dyspepsia  benzocaine 20% Spray 1 Spray(s) Topical every 6 hours PRN sore throat  labetalol Injectable 10 milliGRAM(s) IV Push every 6 hours PRN Systolic blood pressure >  magnesium hydroxide Suspension 30 milliLiter(s) Oral daily PRN Constipation  melatonin 3 milliGRAM(s) Oral at bedtime PRN Insomnia      Vital Signs Last 24 Hrs  T(C): 37.1 (14 Sep 2021 05:12), Max: 37.1 (13 Sep 2021 22:35)  T(F): 98.8 (14 Sep 2021 05:12), Max: 98.8 (14 Sep 2021 05:12)  HR: 79 (14 Sep 2021 05:12) (73 - 79)  BP: 116/58 (14 Sep 2021 05:12) (116/58 - 150/67)  BP(mean): --  RR: 18 (14 Sep 2021 05:12) (18 - 18)  SpO2: --    The patient is also undergoing a voiding trial today; lubin catheter was removed at 6AM today.

## 2021-09-14 NOTE — PROGRESS NOTE ADULT - ASSESSMENT
ASSESSMENT/PLAN    Rehab of Acute Left Occipital Infarcts, Metabolic Encephalopathy s/p Status Epilepticus, with gait disorder and mental status changes. Good acute rehab candidate. Requires acute inpatient rehab and hospital setting to monitor blood sugars, monitor for further seizure activity and monitor mental status with Neurology f/u as needed.  - Patient requires 3 hrs daily of interdisciplinary inpatient rehab at least 5 days a week.   - c/w aspirin   - c/w plavix     S/P Status Epilepticus  - continue Lacosamide 100 mg bid and levetiracetam 1000 mg po bid   - Monitor  - no clinical sign of seizure. No recent facial twitching noted    DM 2, s/p DKA, HHS  - FS glucose in mid 200s on glargine 25 units and lispro 7 units TID AC   - sliding scale prn   - lispro 7 u with meals    -glargine 30 qHs    s/p Pneumonia, s/p Respiratory Failure  - likely aspiration  - s/p antibiotics    Urinary Retention  - lubin out for voiding trial     HTN  - monitor on current meds  - controlled    Diabetic Neuropathy    History of Osteomyelitis, likely Diabetic Ulcers  - s/p remote amputations right toes, and recently left 1st and 2nd toes    History of Bipolar Depression  - Neuropsych Eval  - c/w olanzapine 5 mg po qhs   - cooperating with therapies, though refuses meds or labs at times  - Psychiatry consult if unstable    Anemia  - chronic disease.   - iron 54   - TIBC 145  - Fe sat 37%    HLD  - Atorvastatin    Maintenance   GI - pantoprazole  BM - senna 2 tabs po qhs, magnesium hydroxide 30 ml po qd prn for constipation   sleep- melatonin 3 mg po qhs prn for insomnia   -FEN - monitor lytes, intake  - Diet: soft, DASH, carb controlled    Precautions / PROPHYLAXIS:    - Falls  - Ortho: Weight bearing status: WBAT  - DVT prophylaxis:  Lovenox     ASSESSMENT/PLAN    Rehab of Acute Left Occipital Infarcts, Metabolic Encephalopathy s/p Status Epilepticus, with gait disorder and mental status changes. Good acute rehab candidate. Requires acute inpatient rehab and hospital setting to monitor blood sugars, monitor for further seizure activity and monitor mental status with Neurology f/u as needed.  - Patient requires 3 hrs daily of interdisciplinary inpatient rehab at least 5 days a week.   - c/w aspirin   - c/w plavix     S/P Status Epilepticus  - continue Lacosamide 100 mg bid and levetiracetam 1000 mg po bid   - Monitor  - no clinical sign of seizure. No recent facial twitching noted    DM 2, s/p DKA, HHS  - FS glucose in mid 200s on glargine 25 units and lispro 7 units TID AC   - sliding scale prn   - lispro 7 u with meals    -glargine 30 qHs    s/p Pneumonia, s/p Respiratory Failure  - likely aspiration  - s/p antibiotics    Urinary Retention  - lubin out for voiding trial today in progress    HTN  - monitor on current meds  - controlled    Diabetic Neuropathy    History of Osteomyelitis, likely Diabetic Ulcers  - s/p remote amputations right toes, and recently left 1st and 2nd toes    History of Bipolar Depression  - Neuropsych Eval  - c/w olanzapine 5 mg po qhs   - cooperating with therapies, though refuses meds or labs at times  - Psychiatry consult if unstable    Anemia  - chronic disease.   - iron 54   - TIBC 145  - Fe sat 37%    HLD  - Atorvastatin    Maintenance   GI - pantoprazole  BM - senna 2 tabs po qhs, magnesium hydroxide 30 ml po qd prn for constipation   sleep- melatonin 3 mg po qhs prn for insomnia   -FEN - monitor lytes, intake  - Diet: soft, DASH, carb controlled    Precautions / PROPHYLAXIS:    - Falls  - Ortho: Weight bearing status: WBAT  - DVT prophylaxis:  Lovenox

## 2021-09-15 LAB
BACTERIA # UR AUTO: NEGATIVE — SIGNIFICANT CHANGE UP
EPI CELLS # UR: 0 /HPF — SIGNIFICANT CHANGE UP (ref 0–5)
GLUCOSE BLDC GLUCOMTR-MCNC: 102 MG/DL — HIGH (ref 70–99)
GLUCOSE BLDC GLUCOMTR-MCNC: 119 MG/DL — HIGH (ref 70–99)
GLUCOSE BLDC GLUCOMTR-MCNC: 138 MG/DL — HIGH (ref 70–99)
GLUCOSE BLDC GLUCOMTR-MCNC: 158 MG/DL — HIGH (ref 70–99)
HYALINE CASTS # UR AUTO: 2 /LPF — SIGNIFICANT CHANGE UP (ref 0–7)
RBC CASTS # UR COMP ASSIST: 1 /HPF — SIGNIFICANT CHANGE UP (ref 0–4)
WBC UR QL: 28 /HPF — HIGH (ref 0–5)

## 2021-09-15 RX ADMIN — METFORMIN HYDROCHLORIDE 1000 MILLIGRAM(S): 850 TABLET ORAL at 08:05

## 2021-09-15 RX ADMIN — Medication 7 UNIT(S): at 17:12

## 2021-09-15 RX ADMIN — Medication 4 MILLIGRAM(S): at 21:40

## 2021-09-15 RX ADMIN — MAGNESIUM OXIDE 400 MG ORAL TABLET 400 MILLIGRAM(S): 241.3 TABLET ORAL at 21:40

## 2021-09-15 RX ADMIN — Medication 7 UNIT(S): at 08:05

## 2021-09-15 RX ADMIN — ATORVASTATIN CALCIUM 40 MILLIGRAM(S): 80 TABLET, FILM COATED ORAL at 21:40

## 2021-09-15 RX ADMIN — ENOXAPARIN SODIUM 40 MILLIGRAM(S): 100 INJECTION SUBCUTANEOUS at 11:54

## 2021-09-15 RX ADMIN — Medication 7 UNIT(S): at 11:53

## 2021-09-15 RX ADMIN — LISINOPRIL 5 MILLIGRAM(S): 2.5 TABLET ORAL at 06:24

## 2021-09-15 RX ADMIN — PANTOPRAZOLE SODIUM 40 MILLIGRAM(S): 20 TABLET, DELAYED RELEASE ORAL at 11:56

## 2021-09-15 RX ADMIN — METFORMIN HYDROCHLORIDE 1000 MILLIGRAM(S): 850 TABLET ORAL at 17:13

## 2021-09-15 RX ADMIN — LACOSAMIDE 100 MILLIGRAM(S): 50 TABLET ORAL at 06:24

## 2021-09-15 RX ADMIN — ALBUTEROL 2 PUFF(S): 90 AEROSOL, METERED ORAL at 11:58

## 2021-09-15 RX ADMIN — MAGNESIUM OXIDE 400 MG ORAL TABLET 400 MILLIGRAM(S): 241.3 TABLET ORAL at 17:13

## 2021-09-15 RX ADMIN — LEVETIRACETAM 1000 MILLIGRAM(S): 250 TABLET, FILM COATED ORAL at 06:24

## 2021-09-15 RX ADMIN — ALBUTEROL 2 PUFF(S): 90 AEROSOL, METERED ORAL at 08:06

## 2021-09-15 RX ADMIN — INSULIN GLARGINE 30 UNIT(S): 100 INJECTION, SOLUTION SUBCUTANEOUS at 21:40

## 2021-09-15 RX ADMIN — TIOTROPIUM BROMIDE 1 CAPSULE(S): 18 CAPSULE ORAL; RESPIRATORY (INHALATION) at 08:09

## 2021-09-15 RX ADMIN — MAGNESIUM OXIDE 400 MG ORAL TABLET 400 MILLIGRAM(S): 241.3 TABLET ORAL at 11:55

## 2021-09-15 RX ADMIN — LEVETIRACETAM 1000 MILLIGRAM(S): 250 TABLET, FILM COATED ORAL at 17:13

## 2021-09-15 RX ADMIN — LACOSAMIDE 100 MILLIGRAM(S): 50 TABLET ORAL at 18:46

## 2021-09-15 RX ADMIN — CLOPIDOGREL BISULFATE 75 MILLIGRAM(S): 75 TABLET, FILM COATED ORAL at 11:57

## 2021-09-15 RX ADMIN — MAGNESIUM OXIDE 400 MG ORAL TABLET 400 MILLIGRAM(S): 241.3 TABLET ORAL at 08:04

## 2021-09-15 RX ADMIN — OLANZAPINE 5 MILLIGRAM(S): 15 TABLET, FILM COATED ORAL at 21:40

## 2021-09-15 RX ADMIN — Medication 1 TABLET(S): at 11:55

## 2021-09-15 RX ADMIN — Medication 81 MILLIGRAM(S): at 11:53

## 2021-09-15 NOTE — PROGRESS NOTE ADULT - ASSESSMENT
ASSESSMENT/PLAN    Rehab of Acute Left Occipital Infarcts, Metabolic Encephalopathy s/p Status Epilepticus, with gait disorder and mental status changes. Good acute rehab candidate. Requires acute inpatient rehab and hospital setting to monitor blood sugars, monitor for further seizure activity and monitor mental status with Neurology f/u as needed.  - Patient requires 3 hrs daily of interdisciplinary inpatient rehab at least 5 days a week.   - c/w aspirin   - c/w plavix     S/P Status Epilepticus  - continue Lacosamide 100 mg bid and levetiracetam 1000 mg po bid   - Monitor  - no clinical sign of seizure. No recent facial twitching noted    DM 2, s/p DKA, HHS  - FS glucose in mid 200s on glargine 25 units and lispro 7 units TID AC   - sliding scale prn   - lispro 7 u with meals    -glargine 30 qHs    s/p Pneumonia, s/p Respiratory Failure  - likely aspiration  - s/p antibiotics    Urinary Retention  - lubin out 9/14/2021   - 9/15/2021: voided ~200 cc --> Bladder scan ~700cc, --> straight cath ~800cc. Continue q6hrs bladder scan and CIC   - on cardura 4mg po nightly     HTN  - monitor on current meds  - controlled    Diabetic Neuropathy    History of Osteomyelitis, likely Diabetic Ulcers  - s/p remote amputations right toes, and recently left 1st and 2nd toes    History of Bipolar Depression  - Neuropsych Eval  - c/w olanzapine 5 mg po qhs   - cooperating with therapies, though refuses meds or labs at times  - Psychiatry consult if unstable    Anemia  - chronic disease.   - iron 54   - TIBC 145  - Fe sat 37%    HLD  - Atorvastatin    Maintenance   GI - pantoprazole  BM - senna 2 tabs po qhs, magnesium hydroxide 30 ml po qd prn for constipation   sleep- melatonin 3 mg po qhs prn for insomnia   -FEN - monitor lytes, intake  - Diet: soft, DASH, carb controlled    Precautions / PROPHYLAXIS:    - Falls  - Ortho: Weight bearing status: WBAT  - DVT prophylaxis:  Lovenox     ASSESSMENT/PLAN    Rehab of Acute Left Occipital Infarcts, s/p  Metabolic Encephalopathy s/p Status Epilepticus, with gait disorder and mental status changes. Good acute rehab candidate. Requires acute inpatient rehab and hospital setting to monitor blood sugars, monitor for further seizure activity and monitor mental status with Neurology f/u as needed.  - Patient requires 3 hrs daily of interdisciplinary inpatient rehab at least 5 days a week.   - c/w aspirin   - c/w plavix     S/P Status Epilepticus  - continue Lacosamide 100 mg bid and levetiracetam 1000 mg po bid   - Monitor  - no clinical sign of seizure. No recent facial twitching noted    DM 2, s/p DKA, HHS  - FS glucose better today, 100s, on glargine 25 units and lispro 7 units TID AC   - sliding scale prn   - lispro 7 u with meals    -glargine 30 qHs    s/p Pneumonia, s/p Respiratory Failure  - likely aspiration  - s/p antibiotics    Urinary Retention  - lubin out 9/14/2021   - 9/15/2021: voided ~200 cc --> Bladder scan ~700cc, --> straight cath ~800cc. Continue q6hrs bladder scan and CIC   - on cardura 4mg po nightly   - Bladder Scan PVR q 6 hrs and strt cath if > than 350cc    HTN  - monitor on current meds  - controlled    Diabetic Neuropathy    History of Osteomyelitis, likely Diabetic Ulcers  - s/p remote amputations right toes, and recently left 1st and 2nd toes. Stable    History of Bipolar Depression  - Neuropsych Eval  - c/w olanzapine 5 mg po qhs   - cooperating with therapies, though refuses meds or labs at times  - Psychiatry consult if unstable    Anemia  - chronic disease.   - iron 54   - TIBC 145  - Fe sat 37%  - monitor    HLD  - Atorvastatin    Maintenance   GI - pantoprazole  BM - senna 2 tabs po qhs, magnesium hydroxide 30 ml po qd prn for constipation   sleep- melatonin 3 mg po qhs prn for insomnia   -FEN - monitor lytes, intake  - Diet: soft, DASH, carb controlled    Precautions / PROPHYLAXIS:    - Falls  - Ortho: Weight bearing status: WBAT  - DVT prophylaxis:  Lovenox

## 2021-09-15 NOTE — PROGRESS NOTE ADULT - ATTENDING COMMENTS
I reviewed the chart and examined the patient with the resident and we discussed the findings and treatment plan.  The patient is tolerating the rehab program well. I agree with the findings and treatment plan above, which I modified as indicated. The patient requires 3 hrs a day of acute inpatient rehab. Doing well. Ambulates with CS with RW. Unsteady at times. VSS.  I read, edited and agree with the Assessment.   Rehab of Acute Left Occipital Infarcts, Metabolic Encephalopathy s/p Status Epilepticus, with gait disorder and mental status changes. Good acute rehab candidate. Requires acute inpatient rehab and hospital setting to monitor blood sugars, monitor for further seizure activity and monitor mental status with Neurology f/u as needed.  - Patient requires 3 hrs daily of interdisciplinary inpatient rehab at least 5 days a week.   - c/w aspirin   - c/w plavix     S/P Status Epilepticus  - continue Lacosamide 100 mg bid and levetiracetam 1000 mg po bid   - Monitor  - no clinical sign of seizure. No recent facial twitching noted    DM 2, s/p DKA, HHS  - FS glucose in mid 200s on glargine 25 units and lispro 7 units TID AC   - sliding scale prn   - lispro 7 u with meals    -glargine 30 qHs    s/p Pneumonia, s/p Respiratory Failure  - likely aspiration  - s/p antibiotics    Urinary Retention  - lubin out for voiding trial today -  in progress    HTN  - monitor on current meds  - controlled    Diabetic Neuropathy - stable    History of Osteomyelitis, likely Diabetic Ulcers  - s/p remote amputations right toes, and recently left 1st and 2nd toes    History of Bipolar Depression  - Neuropsych Eval  - c/w olanzapine 5 mg po qhs   - cooperating with therapies, though refuses meds or labs at times  - Psychiatry consult if unstable    Anemia  - chronic disease.   - iron 54   - TIBC 145  - Fe sat 37%    HLD  - Atorvastatin    Maintenance   GI - pantoprazole  BM - senna 2 tabs po qhs, magnesium hydroxide 30 ml po qd prn for constipation   sleep- melatonin 3 mg po qhs prn for insomnia   -FEN - monitor lytes, intake  - Diet: soft, DASH, carb controlled    Precautions / PROPHYLAXIS:    - Falls  - Ortho: Weight bearing status: WBAT  - DVT prophylaxis:  Lovenox I reviewed the chart and examined the patient with the resident and we discussed the findings and treatment plan.  The patient is tolerating the rehab program well. I agree with the findings and treatment plan above, which I modified as indicated. The patient requires 3 hrs a day of acute inpatient rehab.   Neuro stable, VSS stable, BS controlled. 'tired today' and refused morning therapy. Encouraged to participate. Required strt cath for retention. On Cardura. Monitor.  I read, edited and agree with the Assessment.  Rehab of Acute Left Occipital Infarcts, s/p  Metabolic Encephalopathy s/p Status Epilepticus, with gait disorder and mental status changes. Good acute rehab candidate. Requires acute inpatient rehab and hospital setting to monitor blood sugars, monitor for further seizure activity and monitor mental status with Neurology f/u as needed.  - Patient requires 3 hrs daily of interdisciplinary inpatient rehab at least 5 days a week.   - c/w aspirin   - c/w plavix     S/P Status Epilepticus  - continue Lacosamide 100 mg bid and levetiracetam 1000 mg po bid   - Monitor  - no clinical sign of seizure. No recent facial twitching noted    DM 2, s/p DKA, HHS  - FS glucose better today, 100s, on glargine 25 units and lispro 7 units TID AC   - sliding scale prn   - lispro 7 u with meals    -glargine 30 qHs    s/p Pneumonia, s/p Respiratory Failure  - likely aspiration  - s/p antibiotics    Urinary Retention  - lubin out 9/14/2021   - 9/15/2021: voided ~200 cc --> Bladder scan ~700cc, --> straight cath ~800cc. Continue q6hrs bladder scan and CIC   - on cardura 4mg po nightly   - Bladder Scan PVR q 6 hrs and strt cath if > than 350cc    HTN  - monitor on current meds  - controlled    Diabetic Neuropathy    History of Osteomyelitis, likely Diabetic Ulcers  - s/p remote amputations right toes, and recently left 1st and 2nd toes. Stable    History of Bipolar Depression  - Neuropsych Eval  - c/w olanzapine 5 mg po qhs   - cooperating with therapies, though refuses meds or labs at times  - Psychiatry consult if unstable    Anemia  - chronic disease.   - iron 54   - TIBC 145  - Fe sat 37%  - monitor    HLD  - Atorvastatin    Maintenance   GI - pantoprazole  BM - senna 2 tabs po qhs, magnesium hydroxide 30 ml po qd prn for constipation   sleep- melatonin 3 mg po qhs prn for insomnia   -FEN - monitor lytes, intake  - Diet: soft, DASH, carb controlled    Precautions / PROPHYLAXIS:    - Falls  - Ortho: Weight bearing status: WBAT  - DVT prophylaxis:  Lovenox

## 2021-09-15 NOTE — PROGRESS NOTE ADULT - SUBJECTIVE AND OBJECTIVE BOX
Patient is a 71y old  Male who presents with a chief complaint of stroke (09 Sep 2021 15:18)    HPI:  History of Present Illness:   72 y/o M with pmh of DM 2, osteomyelitis, gerd, depression, HLD, HTN presenting to the ED for altered mental status.  The patient was doing well the night prior to admission, but on 21, was noted to be unable to talk and walk independently. As per his ex wife, the right sided then started shaking. She called 911.   On presentation the patient had status epilepticus and he received 4 mg of lorazepam.   CT brain was done and an acute stroke could not be excluded because of technical difficulties.  His blood glucose was found to be more than 600. He admits to eating 2 hot dogs, french fries and a milk shake prior to the episode, but does not remember being brought to the hospital.    Pt admitted to ICU with DKA/HHS. Had seizures consistent with tonic-clonic seizure x2 in ED on . Pt was loaded with 3g Keppra and transferred to MICU where he was started on an insulin drip. He was subsequently intubated for airway protection and started on Versed gtt. Course was complicated by respiratory failure, pneumonia, encephalopathy, metabolic derangements. A Jimenez Catheter was placed for urinary retention. He was treated with Vanco and Aztreonam for presumed aspiration pneumonia from intubation. Tox screen was positive for Benzos, though he denies taking drugs. Pt was extubated on . Pt was placed on VEEG which was negative for seizure. However, Vimpat was added to regime due to changes on EEG. MRI on  revealed punctate acute infarcts involving L occipital cortex. CT Head on 9/3 revealed effusions consistent with mastoiditis. A Loop Recorder was placed on 21.  His ex-wife said that he has Bipolar Depression and he was on a medication, although this is not found in his record. She also does not recall his meds.    He was seen by PT and OT on the medical service. He requires min assistance to transfer bed to chair and CG to ambulate short distances and requires mod to max assistance for most ADL activities. He was evaluated by me on the medical service and was found to be a good acute inpatient rehab candidate.      TODAY'S SUBJECTIVE & REVIEW OF SYMPTOMS:  Patient seen and examined this AM with Attending physiatrist Dr Matta   No acute events overnight. Reports that he feels weak this morning. States he didnt sleep well last night. denies fevers chills nausea vomitting chest pain sob abd pain, dysuria.    Voiding and passing stool spontaneously.   Per nursing voided 200 cc . bladder scan performed this am around 10 am. PVR of ~700. straight cath performed with output of ~800cc. continue bladder and scan and intermittent cath.   Tolerating oral diet. Tolerating physical and occupational therapy. Therapies going well.   Vitals reviewed. hemodynamically stable.     ROS   Constiutional:    [ x  ] WNL           [   ] poor appetite   [   ] insomnia   [ x  ] tired/weak   ENT:                     [   ]                   [ x ]   voice change since intubation   Cardio:                [ x  ] WNL           [   ] CP   [   ] PHILLIPS   [   ] palpitations               Resp:                   [ x  ] WNL           [   ] SOB   [   ] cough   [   ] wheezing   GI:                        [ x  ] WNL           [   ] constipation   [   ] diarrhea   [   ] abdominal pain   [   ] nausea   [   ] emesis                                :                      [   ] WNL           [ x  ] JIMENEZ  [   ] dysuria   [   ] difficulty voiding             Endo:                   [ x  ] WNL          [   ] polyuria   [   ] temperature intolerance                 Skin:                     [ x  ] WNL          [   ] pain   [   ] wound   [   ] rash   MSK:                    [   ] WNL          [   ] muscle pain   [   ] joint pain/ stiffness   [   ] muscle tenderness   [   ] swelling  [ x ] multiple toe amputations right foot 10 years ago, 1st 2 toes left foot this year   Neuro:                 [  x ] WNL          [   ] HA   [   ] change in vision   [   ] tremor   [   ] weakness   [   ]dysphagia              Cognitive:           [ x  ] WNL           [   ]confusion      Psych:                  [   ] WNL           [   ] hallucinations   [   ]agitation   [   ] delusion   [ x  ] bipolar depression    PHYSICAL EXAM  Vital Signs Last 24 Hrs  T(C): 35.6 (09-15-21 @ 04:55), Max: 36.7 (21 @ 22:19)  T(F): 96.1 (09-15-21 @ 04:55), Max: 98.1 (21 @ 22:19)  HR: 86 (09-15-21 @ 04:55) (83 - 86)  BP: 125/59 (09-15-21 @ 04:55) (125/59 - 159/70)  BP(mean): --  RR: 19 (09-15-21 @ 04:55) (18 - 19)  SpO2: --    General:  [ x  ] WD/ WN male, NAD, Resting Comfortable,   [   ] other:                                HEENT: [  x ] NC/AT, EOMI, PERRL , Normal Conjunctivae,   [   ] other:  Cardio: [ x  ] RRR, no murmer,   [   ] other:                              Pulm: [ x  ] No Respiratory Distress,  Lungs CTAB,   [   ] other:                       Abdomen: [ x  ]ND/NT, Soft,   [   ] other:    : [ x  ] NO JIMENEZ CATHETER, [ } JIMENEZ CATHETER- no meatal tear, no discharge, [   ] other:                                            MSK: [ x  ] No joint swelling, Full ROM,   [  x ] other:   s/p amputation all toes right foot, 1st two toes left foot                                    Ext: [ x  ]No C/C/E, No calf tenderness,   [   ]other:    Skin: [ x  ]intact,   [   ] other:                                                                   Neurological Examination:  Cognitive: [  x  ] AAO x 3,   [    ]  other:                                                                      Attention:  [    ] intact,   [  x  ]  other:  needs redirection, tangential                                                                                            Communication: [  x  ]Fluent, no dysarthria, following commands:  [  x  ] other: hypophonic, tangential speech, redirectable  CN II - XII:  [  x  ] intact,  [    ] other:                                                                                        Motor:   RIGHT UE: [ x  ] WNL,  [   ] other:  LEFT    UE: [ x  ] WNL,  [   ] other:  RIGHT LE: [ x  ] WNL,  [   ] other:   LEFT    LE: [ x  ] WNL,  [   ] other:    Tone: [  x  ] wnl,   [    ]  other:  DTRs: [  x ]symmetric, [   ] other:  Coordination:   [   x ] intact,   [    ] other:                                                                           Sensory: [    ] Intact to light touch,   [  x  ] other:  decreased in a stocking-glove distribution    CLOF   Rolling S   Sit to lying    Lying to edge of bed S   Sit to stand S   Car transfers TA   Gait: Amb 150 ft w RW   Footwear PA  Shower PA     MEDICATIONS  (STANDING):  ALBUTerol    90 MICROgram(s) HFA Inhaler 2 Puff(s) Inhalation every 6 hours  aspirin  chewable 81 milliGRAM(s) Oral daily  atorvastatin 40 milliGRAM(s) Oral at bedtime  chlorhexidine 4% Liquid 1 Application(s) Topical <User Schedule>  clopidogrel Tablet 75 milliGRAM(s) Oral daily  dextrose 40% Gel 15 Gram(s) Oral once  dextrose 5%. 1000 milliLiter(s) (50 mL/Hr) IV Continuous <Continuous>  dextrose 5%. 1000 milliLiter(s) (100 mL/Hr) IV Continuous <Continuous>  dextrose 50% Injectable 25 Gram(s) IV Push once  doxazosin 4 milliGRAM(s) Oral at bedtime  enoxaparin Injectable 40 milliGRAM(s) SubCutaneous daily  glucagon  Injectable 1 milliGRAM(s) IntraMuscular once  insulin glargine Injectable (LANTUS) 30 Unit(s) SubCutaneous at bedtime  insulin lispro (ADMELOG) corrective regimen sliding scale   SubCutaneous three times a day before meals  insulin lispro Injectable (ADMELOG) 7 Unit(s) SubCutaneous three times a day before meals  lacosamide 100 milliGRAM(s) Oral every 12 hours  levETIRAcetam 1000 milliGRAM(s) Oral two times a day  lisinopril 5 milliGRAM(s) Oral daily  magnesium oxide 400 milliGRAM(s) Oral <User Schedule>  metFORMIN 1000 milliGRAM(s) Oral two times a day with meals  multivitamin 1 Tablet(s) Oral daily  OLANZapine 5 milliGRAM(s) Oral at bedtime  pantoprazole   Suspension 40 milliGRAM(s) Oral daily  senna 2 Tablet(s) Oral at bedtime  sitaGLIPtin 50 milliGRAM(s) Oral <User Schedule>  tiotropium 18 MICROgram(s) Capsule 1 Capsule(s) Inhalation daily    MEDICATIONS  (PRN):  acetaminophen   Tablet .. 650 milliGRAM(s) Oral every 6 hours PRN Temp greater or equal to 38C (100.4F), Mild Pain (1 - 3)  aluminum hydroxide/magnesium hydroxide/simethicone Suspension 30 milliLiter(s) Oral every 4 hours PRN Dyspepsia  benzocaine 20% Spray 1 Spray(s) Topical every 6 hours PRN sore throat  labetalol Injectable 10 milliGRAM(s) IV Push every 6 hours PRN Systolic blood pressure >  magnesium hydroxide Suspension 30 milliLiter(s) Oral daily PRN Constipation  melatonin 3 milliGRAM(s) Oral at bedtime PRN Insomnia               Labs/Imaging   Urinalysis Basic - ( 14 Sep 2021 23:00 )    Color: Light Yellow / Appearance: Clear / S.021 / pH: x  Gluc: x / Ketone: Negative  / Bili: Negative / Urobili: <2 mg/dL   Blood: x / Protein: Trace / Nitrite: Negative   Leuk Esterase: Small / RBC: 1 /HPF / WBC 28 /HPF   Sq Epi: x / Non Sq Epi: 0 /HPF / Bacteria: Negative      POCT Blood Glucose.: 102 mg/dL (09-15-21 @ 10:10)  POCT Blood Glucose.: 138 mg/dL (09-15-21 @ 07:13)  POCT Blood Glucose.: 249 mg/dL (21 @ 20:53)  POCT Blood Glucose.: 270 mg/dL (21 @ 17:35)  POCT Blood Glucose.: 151 mg/dL (21 @ 12:08)  POCT Blood Glucose.: 225 mg/dL (21 @ 07:21)  POCT Blood Glucose.: 207 mg/dL (21 @ 22:09)  POCT Blood Glucose.: 188 mg/dL (21 @ 20:40)  POCT Blood Glucose.: 115 mg/dL (21 @ 16:30)  POCT Blood Glucose.: 225 mg/dL (21 @ 11:06)  POCT Blood Glucose.: 233 mg/dL (21 @ 07:28)  POCT Blood Glucose.: 212 mg/dL (21 @ 21:26)   Patient is a 71y old  Male who presents with a chief complaint of stroke (09 Sep 2021 15:18)    HPI:  History of Present Illness:   70 y/o M with pmh of DM 2, osteomyelitis, gerd, depression, HLD, HTN presenting to the ED for altered mental status.  The patient was doing well the night prior to admission, but on 21, was noted to be unable to talk and walk independently. As per his ex wife, the right sided then started shaking. She called 911.   On presentation the patient had status epilepticus and he received 4 mg of lorazepam.   CT brain was done and an acute stroke could not be excluded because of technical difficulties.  His blood glucose was found to be more than 600. He admits to eating 2 hot dogs, french fries and a milk shake prior to the episode, but does not remember being brought to the hospital.    Pt admitted to ICU with DKA/HHS. Had seizures consistent with tonic-clonic seizure x2 in ED on . Pt was loaded with 3g Keppra and transferred to MICU where he was started on an insulin drip. He was subsequently intubated for airway protection and started on Versed gtt. Course was complicated by respiratory failure, pneumonia, encephalopathy, metabolic derangements. A Jimenez Catheter was placed for urinary retention. He was treated with Vanco and Aztreonam for presumed aspiration pneumonia from intubation. Tox screen was positive for Benzos, though he denies taking drugs. Pt was extubated on . Pt was placed on VEEG which was negative for seizure. However, Vimpat was added to regime due to changes on EEG. MRI on  revealed punctate acute infarcts involving L occipital cortex. CT Head on 9/3 revealed effusions consistent with mastoiditis. A Loop Recorder was placed on 21.  His ex-wife said that he has Bipolar Depression and he was on a medication, although this is not found in his record. She also does not recall his meds.    He was seen by PT and OT on the medical service. He requires min assistance to transfer bed to chair and CG to ambulate short distances and requires mod to max assistance for most ADL activities. He was evaluated by me on the medical service and was found to be a good acute inpatient rehab candidate.      TODAY'S SUBJECTIVE & REVIEW OF SYMPTOMS:  Patient seen and examined this AM with Attending physiatrist Dr Matta   No acute events overnight. Reports that he feels weak this morning. States he didnt sleep well last night. denies fevers chills nausea vomitting chest pain sob abd pain, dysuria.    Voiding and passing stool spontaneously.   Per nursing voided 200 cc . bladder scan performed this am around 10 am. PVR of ~700. straight cath performed with output of ~800cc. continue bladder and scan and intermittent cath.   Tolerating oral diet. Tolerating physical and occupational therapy. Therapies going well.   Vitals reviewed. hemodynamically stable.     ROS   Constiutional:    [ x  ] WNL           [   ] poor appetite   [   ] insomnia   [ x  ] tired/weak   ENT:                     [   ]                   [ x ]   voice change since intubation   Cardio:                [ x  ] WNL           [   ] CP   [   ] PHILLIPS   [   ] palpitations               Resp:                   [ x  ] WNL           [   ] SOB   [   ] cough   [   ] wheezing   GI:                        [ x  ] WNL           [   ] constipation   [   ] diarrhea   [   ] abdominal pain   [   ] nausea   [   ] emesis                                :                      [   ] WNL           [   ] JIMENEZ  [   ] dysuria   [ x  ] difficulty voiding             Endo:                   [ x  ] WNL          [   ] polyuria   [   ] temperature intolerance                 Skin:                     [ x  ] WNL          [   ] pain   [   ] wound   [   ] rash   MSK:                    [   ] WNL          [   ] muscle pain   [   ] joint pain/ stiffness   [   ] muscle tenderness   [   ] swelling  [ x ] multiple toe amputations right foot 10 years ago, 1st 2 toes left foot this year   Neuro:                 [  x ] WNL          [   ] HA   [   ] change in vision   [   ] tremor   [   ] weakness   [   ]dysphagia              Cognitive:           [ x  ] WNL           [   ]confusion      Psych:                  [   ] WNL           [   ] hallucinations   [   ]agitation   [   ] delusion   [ x  ] bipolar depression    PHYSICAL EXAM  Vital Signs Last 24 Hrs  T(C): 35.6 (09-15-21 @ 04:55), Max: 36.7 (21 @ 22:19)  T(F): 96.1 (09-15-21 @ 04:55), Max: 98.1 (21 @ 22:19)  HR: 86 (09-15-21 @ 04:55) (83 - 86)  BP: 125/59 (09-15-21 @ 04:55) (125/59 - 159/70)  BP(mean): --  RR: 19 (09-15-21 @ 04:55) (18 - 19)  SpO2: --    General:  [ x  ] WD/ WN male, NAD, Resting Comfortable,   [   ] other:                                HEENT: [  x ] NC/AT, EOMI, PERRL , Normal Conjunctivae,   [   ] other:  Cardio: [ x  ] RRR, no murmer,   [   ] other:                              Pulm: [ x  ] No Respiratory Distress,  Lungs CTAB,   [   ] other:                       Abdomen: [ x  ]ND/NT, Soft,   [   ] other:    : [ x  ] NO JIMENEZ CATHETER, [ } JIMENEZ CATHETER- no meatal tear, no discharge, [   ] other:                                            MSK: [ x  ] No joint swelling, Full ROM,   [  x ] other:   s/p amputation all toes right foot, 1st two toes left foot                                    Ext: [ x  ]No C/C/E, No calf tenderness,   [   ]other:    Skin: [ x  ]intact,   [   ] other:                                                                   Neurological Examination:  Cognitive: [  x  ] AAO x 3,   [    ]  other:                                                                      Attention:  [    ] intact,   [  x  ]  other:  needs redirection, tangential                                                                                            Communication: [  x  ]Fluent, no dysarthria, following commands:  [  x  ] other: hypophonic, tangential speech, redirectable  CN II - XII:  [  x  ] intact,  [    ] other:                                                                                        Motor:   RIGHT UE: [ x  ] WNL,  [   ] other:  LEFT    UE: [ x  ] WNL,  [   ] other:  RIGHT LE: [ x  ] WNL,  [   ] other:   LEFT    LE: [ x  ] WNL,  [   ] other:    Tone: [  x  ] wnl,   [    ]  other:  DTRs: [  x ]symmetric, [   ] other:  Coordination:   [   x ] intact,   [    ] other:                                                                           Sensory: [    ] Intact to light touch,   [  x  ] other:  decreased in a stocking-glove distribution    CLOF   Rolling S   Sit to lying    Lying to edge of bed S   Sit to stand S   Car transfers TA   Gait: Amb 150 ft w RW CG  Footwear PA  Shower PA     MEDICATIONS  (STANDING):  ALBUTerol    90 MICROgram(s) HFA Inhaler 2 Puff(s) Inhalation every 6 hours  aspirin  chewable 81 milliGRAM(s) Oral daily  atorvastatin 40 milliGRAM(s) Oral at bedtime  chlorhexidine 4% Liquid 1 Application(s) Topical <User Schedule>  clopidogrel Tablet 75 milliGRAM(s) Oral daily  dextrose 40% Gel 15 Gram(s) Oral once  dextrose 5%. 1000 milliLiter(s) (50 mL/Hr) IV Continuous <Continuous>  dextrose 5%. 1000 milliLiter(s) (100 mL/Hr) IV Continuous <Continuous>  dextrose 50% Injectable 25 Gram(s) IV Push once  doxazosin 4 milliGRAM(s) Oral at bedtime  enoxaparin Injectable 40 milliGRAM(s) SubCutaneous daily  glucagon  Injectable 1 milliGRAM(s) IntraMuscular once  insulin glargine Injectable (LANTUS) 30 Unit(s) SubCutaneous at bedtime  insulin lispro (ADMELOG) corrective regimen sliding scale   SubCutaneous three times a day before meals  insulin lispro Injectable (ADMELOG) 7 Unit(s) SubCutaneous three times a day before meals  lacosamide 100 milliGRAM(s) Oral every 12 hours  levETIRAcetam 1000 milliGRAM(s) Oral two times a day  lisinopril 5 milliGRAM(s) Oral daily  magnesium oxide 400 milliGRAM(s) Oral <User Schedule>  metFORMIN 1000 milliGRAM(s) Oral two times a day with meals  multivitamin 1 Tablet(s) Oral daily  OLANZapine 5 milliGRAM(s) Oral at bedtime  pantoprazole   Suspension 40 milliGRAM(s) Oral daily  senna 2 Tablet(s) Oral at bedtime  sitaGLIPtin 50 milliGRAM(s) Oral <User Schedule>  tiotropium 18 MICROgram(s) Capsule 1 Capsule(s) Inhalation daily    MEDICATIONS  (PRN):  acetaminophen   Tablet .. 650 milliGRAM(s) Oral every 6 hours PRN Temp greater or equal to 38C (100.4F), Mild Pain (1 - 3)  aluminum hydroxide/magnesium hydroxide/simethicone Suspension 30 milliLiter(s) Oral every 4 hours PRN Dyspepsia  benzocaine 20% Spray 1 Spray(s) Topical every 6 hours PRN sore throat  labetalol Injectable 10 milliGRAM(s) IV Push every 6 hours PRN Systolic blood pressure >  magnesium hydroxide Suspension 30 milliLiter(s) Oral daily PRN Constipation  melatonin 3 milliGRAM(s) Oral at bedtime PRN Insomnia               Labs/Imaging   Urinalysis Basic - ( 14 Sep 2021 23:00 )    Color: Light Yellow / Appearance: Clear / S.021 / pH: x  Gluc: x / Ketone: Negative  / Bili: Negative / Urobili: <2 mg/dL   Blood: x / Protein: Trace / Nitrite: Negative   Leuk Esterase: Small / RBC: 1 /HPF / WBC 28 /HPF   Sq Epi: x / Non Sq Epi: 0 /HPF / Bacteria: Negative      POCT Blood Glucose.: 102 mg/dL (09-15-21 @ 10:10)  POCT Blood Glucose.: 138 mg/dL (09-15-21 @ 07:13)  POCT Blood Glucose.: 249 mg/dL (21 @ 20:53)  POCT Blood Glucose.: 270 mg/dL (21 @ 17:35)  POCT Blood Glucose.: 151 mg/dL (21 @ 12:08)  POCT Blood Glucose.: 225 mg/dL (21 @ 07:21)  POCT Blood Glucose.: 207 mg/dL (21 @ 22:09)  POCT Blood Glucose.: 188 mg/dL (21 @ 20:40)  POCT Blood Glucose.: 115 mg/dL (21 @ 16:30)  POCT Blood Glucose.: 225 mg/dL (21 @ 11:06)  POCT Blood Glucose.: 233 mg/dL (21 @ 07:28)  POCT Blood Glucose.: 212 mg/dL (21 @ 21:26)

## 2021-09-16 LAB
APPEARANCE UR: CLEAR — SIGNIFICANT CHANGE UP
BACTERIA # UR AUTO: NEGATIVE — SIGNIFICANT CHANGE UP
BILIRUB UR-MCNC: NEGATIVE — SIGNIFICANT CHANGE UP
COLOR SPEC: SIGNIFICANT CHANGE UP
DIFF PNL FLD: NEGATIVE — SIGNIFICANT CHANGE UP
EPI CELLS # UR: 1 /HPF — SIGNIFICANT CHANGE UP (ref 0–5)
GLUCOSE BLDC GLUCOMTR-MCNC: 106 MG/DL — HIGH (ref 70–99)
GLUCOSE BLDC GLUCOMTR-MCNC: 107 MG/DL — HIGH (ref 70–99)
GLUCOSE BLDC GLUCOMTR-MCNC: 171 MG/DL — HIGH (ref 70–99)
GLUCOSE BLDC GLUCOMTR-MCNC: 191 MG/DL — HIGH (ref 70–99)
GLUCOSE UR QL: NEGATIVE — SIGNIFICANT CHANGE UP
HYALINE CASTS # UR AUTO: 1 /LPF — SIGNIFICANT CHANGE UP (ref 0–7)
KETONES UR-MCNC: NEGATIVE — SIGNIFICANT CHANGE UP
LEUKOCYTE ESTERASE UR-ACNC: ABNORMAL
NITRITE UR-MCNC: NEGATIVE — SIGNIFICANT CHANGE UP
PH UR: 8 — SIGNIFICANT CHANGE UP (ref 5–8)
PROT UR-MCNC: SIGNIFICANT CHANGE UP
RBC CASTS # UR COMP ASSIST: 2 /HPF — SIGNIFICANT CHANGE UP (ref 0–4)
SARS-COV-2 RNA SPEC QL NAA+PROBE: SIGNIFICANT CHANGE UP
SP GR SPEC: 1.01 — SIGNIFICANT CHANGE UP (ref 1.01–1.03)
UROBILINOGEN FLD QL: SIGNIFICANT CHANGE UP
WBC UR QL: 15 /HPF — HIGH (ref 0–5)

## 2021-09-16 RX ADMIN — LACOSAMIDE 100 MILLIGRAM(S): 50 TABLET ORAL at 06:48

## 2021-09-16 RX ADMIN — LEVETIRACETAM 1000 MILLIGRAM(S): 250 TABLET, FILM COATED ORAL at 05:54

## 2021-09-16 RX ADMIN — MAGNESIUM OXIDE 400 MG ORAL TABLET 400 MILLIGRAM(S): 241.3 TABLET ORAL at 12:07

## 2021-09-16 RX ADMIN — Medication 7 UNIT(S): at 16:43

## 2021-09-16 RX ADMIN — METFORMIN HYDROCHLORIDE 1000 MILLIGRAM(S): 850 TABLET ORAL at 07:50

## 2021-09-16 RX ADMIN — ATORVASTATIN CALCIUM 40 MILLIGRAM(S): 80 TABLET, FILM COATED ORAL at 22:36

## 2021-09-16 RX ADMIN — Medication 1 TABLET(S): at 12:07

## 2021-09-16 RX ADMIN — MAGNESIUM OXIDE 400 MG ORAL TABLET 400 MILLIGRAM(S): 241.3 TABLET ORAL at 07:51

## 2021-09-16 RX ADMIN — ALBUTEROL 2 PUFF(S): 90 AEROSOL, METERED ORAL at 20:00

## 2021-09-16 RX ADMIN — INSULIN GLARGINE 30 UNIT(S): 100 INJECTION, SOLUTION SUBCUTANEOUS at 22:36

## 2021-09-16 RX ADMIN — CHLORHEXIDINE GLUCONATE 1 APPLICATION(S): 213 SOLUTION TOPICAL at 05:52

## 2021-09-16 RX ADMIN — ENOXAPARIN SODIUM 40 MILLIGRAM(S): 100 INJECTION SUBCUTANEOUS at 12:08

## 2021-09-16 RX ADMIN — MAGNESIUM OXIDE 400 MG ORAL TABLET 400 MILLIGRAM(S): 241.3 TABLET ORAL at 22:36

## 2021-09-16 RX ADMIN — Medication 7 UNIT(S): at 12:05

## 2021-09-16 RX ADMIN — Medication 4 MILLIGRAM(S): at 22:36

## 2021-09-16 RX ADMIN — CLOPIDOGREL BISULFATE 75 MILLIGRAM(S): 75 TABLET, FILM COATED ORAL at 12:09

## 2021-09-16 RX ADMIN — LEVETIRACETAM 1000 MILLIGRAM(S): 250 TABLET, FILM COATED ORAL at 18:03

## 2021-09-16 RX ADMIN — MAGNESIUM OXIDE 400 MG ORAL TABLET 400 MILLIGRAM(S): 241.3 TABLET ORAL at 18:03

## 2021-09-16 RX ADMIN — Medication 81 MILLIGRAM(S): at 12:07

## 2021-09-16 RX ADMIN — Medication 7 UNIT(S): at 07:49

## 2021-09-16 RX ADMIN — Medication 1: at 12:06

## 2021-09-16 RX ADMIN — Medication 1: at 07:49

## 2021-09-16 RX ADMIN — LACOSAMIDE 100 MILLIGRAM(S): 50 TABLET ORAL at 18:03

## 2021-09-16 RX ADMIN — METFORMIN HYDROCHLORIDE 1000 MILLIGRAM(S): 850 TABLET ORAL at 18:03

## 2021-09-16 RX ADMIN — SENNA PLUS 2 TABLET(S): 8.6 TABLET ORAL at 22:37

## 2021-09-16 RX ADMIN — OLANZAPINE 5 MILLIGRAM(S): 15 TABLET, FILM COATED ORAL at 22:37

## 2021-09-16 RX ADMIN — PANTOPRAZOLE SODIUM 40 MILLIGRAM(S): 20 TABLET, DELAYED RELEASE ORAL at 12:08

## 2021-09-16 RX ADMIN — LISINOPRIL 5 MILLIGRAM(S): 2.5 TABLET ORAL at 05:54

## 2021-09-16 NOTE — PROGRESS NOTE ADULT - ASSESSMENT
ASSESSMENT/PLAN    Rehab of Acute Left Occipital Infarcts, s/p  Metabolic Encephalopathy s/p Status Epilepticus, with gait disorder and mental status changes. Good acute rehab candidate. Requires acute inpatient rehab and hospital setting to monitor blood sugars, monitor for further seizure activity and monitor mental status with Neurology f/u as needed.  - Patient requires 3 hrs daily of interdisciplinary inpatient rehab at least 5 days a week.   - c/w aspirin   - c/w plavix     S/P Status Epilepticus  - continue Lacosamide 100 mg bid and levetiracetam 1000 mg po bid   - Monitor  - no clinical sign of seizure. No recent facial twitching noted    DM 2, s/p DKA, HHS  - FS glucose better today, 100s, on glargine 25 units and lispro 7 units TID AC   - sliding scale prn   - lispro 7 u with meals    -glargine 30 qHs    s/p Pneumonia, s/p Respiratory Failure  - likely aspiration  - s/p antibiotics    Urinary Retention  - lubin out 9/14/2021   - 9/15/2021: voided ~200 cc --> Bladder scan ~700cc, --> straight cath ~800cc. Continue q6hrs bladder scan and CIC   - on cardura 4mg po nightly   - Bladder Scan PVR q 6 hrs and strt cath if > than 350cc    HTN  - monitor on current meds  - controlled    Diabetic Neuropathy    History of Osteomyelitis, likely Diabetic Ulcers  - s/p remote amputations right toes, and recently left 1st and 2nd toes. Stable    History of Bipolar Depression  - Neuropsych Eval  - c/w olanzapine 5 mg po qhs   - cooperating with therapies, though refuses meds or labs at times  - Psychiatry consult if unstable    Anemia  - chronic disease.   - iron 54   - TIBC 145  - Fe sat 37%  - monitor    HLD  - Atorvastatin    Maintenance   GI - pantoprazole  BM - senna 2 tabs po qhs, magnesium hydroxide 30 ml po qd prn for constipation   sleep- melatonin 3 mg po qhs prn for insomnia   -FEN - monitor lytes, intake  - Diet: soft, DASH, carb controlled    Precautions / PROPHYLAXIS:    - Falls  - Ortho: Weight bearing status: WBAT  - DVT prophylaxis:  Lovenox

## 2021-09-16 NOTE — PROGRESS NOTE ADULT - ATTENDING COMMENTS
Patient seen, examined and discussed with the resident. I have directed the care. I edited the note. He requires acute rehab with 3 hours of therapies.   He is walking nicely with a rolling walker.  ehab of Acute Left Occipital Infarcts, s/p  Metabolic Encephalopathy s/p Status Epilepticus, with gait disorder and mental status changes. Good acute rehab candidate. Requires acute inpatient rehab and hospital setting to monitor blood sugars, monitor for further seizure activity and monitor mental status with Neurology f/u as needed.  - Patient requires 3 hrs daily of interdisciplinary inpatient rehab at least 5 days a week.   - c/w aspirin   - c/w plavix     S/P Status Epilepticus  - continue Lacosamide 100 mg bid and levetiracetam 1000 mg po bid   - Monitor  - no clinical sign of seizure. No recent facial twitching noted    DM 2, s/p DKA, HHS  - FS glucose better today, 100s, on glargine 25 units and lispro 7 units TID AC   - sliding scale prn   - lispro 7 u with meals    -glargine 30 qHs    s/p Pneumonia, s/p Respiratory Failure  - likely aspiration  - s/p antibiotics    Urinary Retention  - lubin out 9/14/2021   - 9/15/2021: voided ~200 cc --> Bladder scan ~700cc, --> straight cath ~800cc. Continue q6hrs bladder scan and CIC   - on cardura 4mg po nightly   - Bladder Scan PVR q 6 hrs and strt cath if > than 350cc    HTN  - monitor on current meds  - controlled    Diabetic Neuropathy    History of Osteomyelitis, likely Diabetic Ulcers  - s/p remote amputations right toes, and recently left 1st and 2nd toes. Stable    History of Bipolar Depression  - Neuropsych Eval  - c/w olanzapine 5 mg po qhs   - cooperating with therapies, though refuses meds or labs at times  - Psychiatry consult if unstable    Anemia  - chronic disease.   - iron 54   - TIBC 145  - Fe sat 37%  - monitor    HLD  - Atorvastatin    Maintenance   GI - pantoprazole  BM - senna 2 tabs po qhs, magnesium hydroxide 30 ml po qd prn for constipation   sleep- melatonin 3 mg po qhs prn for insomnia   -FEN - monitor lytes, intake  - Diet: soft, DASH, carb controlled    Precautions / PROPHYLAXIS:    - Falls  - Ortho: Weight bearing status: WBAT  - DVT prophylaxis:  Lovenox

## 2021-09-16 NOTE — PROGRESS NOTE ADULT - SUBJECTIVE AND OBJECTIVE BOX
Patient is a 71y old  Male who presents with a chief complaint of stroke (09 Sep 2021 15:18)    HPI:  History of Present Illness:   70 y/o M with pmh of DM 2, osteomyelitis, gerd, depression, HLD, HTN presenting to the ED for altered mental status.  The patient was doing well the night prior to admission, but on 21, was noted to be unable to talk and walk independently. As per his ex wife, the right sided then started shaking. She called 911.   On presentation the patient had status epilepticus and he received 4 mg of lorazepam.   CT brain was done and an acute stroke could not be excluded because of technical difficulties.  His blood glucose was found to be more than 600. He admits to eating 2 hot dogs, french fries and a milk shake prior to the episode, but does not remember being brought to the hospital.    Pt admitted to ICU with DKA/HHS. Had seizures consistent with tonic-clonic seizure x2 in ED on . Pt was loaded with 3g Keppra and transferred to MICU where he was started on an insulin drip. He was subsequently intubated for airway protection and started on Versed gtt. Course was complicated by respiratory failure, pneumonia, encephalopathy, metabolic derangements. A Jimenez Catheter was placed for urinary retention. He was treated with Vanco and Aztreonam for presumed aspiration pneumonia from intubation. Tox screen was positive for Benzos, though he denies taking drugs. Pt was extubated on . Pt was placed on VEEG which was negative for seizure. However, Vimpat was added to regime due to changes on EEG. MRI on  revealed punctate acute infarcts involving L occipital cortex. CT Head on 9/3 revealed effusions consistent with mastoiditis. A Loop Recorder was placed on 21.  His ex-wife said that he has Bipolar Depression and he was on a medication, although this is not found in his record. She also does not recall his meds.    He was seen by PT and OT on the medical service. He requires min assistance to transfer bed to chair and CG to ambulate short distances and requires mod to max assistance for most ADL activities. He was evaluated by me on the medical service and was found to be a good acute inpatient rehab candidate.      TODAY'S SUBJECTIVE & REVIEW OF SYMPTOMS:  Patient seen and examined this AM with Dr Goncalves. Pt reports feeling much better today.  He got good sleep last night and feels totally recovered.   ROS   Constiutional:    [ x  ] WNL           [   ] poor appetite   [   ] insomnia   [ x  ] tired/weak   ENT:                     [   ]                   [ x ]   voice change since intubation   Cardio:                [ x  ] WNL           [   ] CP   [   ] PHILLIPS   [   ] palpitations               Resp:                   [ x  ] WNL           [   ] SOB   [   ] cough   [   ] wheezing   GI:                        [ x  ] WNL           [   ] constipation   [   ] diarrhea   [   ] abdominal pain   [   ] nausea   [   ] emesis                                :                      [   ] WNL           [   ] JIMENEZ  [   ] dysuria   [ x  ] difficulty voiding             Endo:                   [ x  ] WNL          [   ] polyuria   [   ] temperature intolerance                 Skin:                     [ x  ] WNL          [   ] pain   [   ] wound   [   ] rash   MSK:                    [   ] WNL          [   ] muscle pain   [   ] joint pain/ stiffness   [   ] muscle tenderness   [   ] swelling  [ x ] multiple toe amputations right foot 10 years ago, 1st 2 toes left foot this year   Neuro:                 [  x ] WNL          [   ] HA   [   ] change in vision   [   ] tremor   [   ] weakness   [   ]dysphagia              Cognitive:           [ x  ] WNL           [   ]confusion      Psych:                  [   ] WNL           [   ] hallucinations   [   ]agitation   [   ] delusion   [ x  ] bipolar depression    PHYSICAL EXAM  Vital Signs Last 24 Hrs  T(C): 36.4 (16 Sep 2021 05:48), Max: 37 (15 Sep 2021 20:36)  T(F): 97.6 (16 Sep 2021 05:48), Max: 98.6 (15 Sep 2021 20:36)  HR: 67 (16 Sep 2021 05:48) (67 - 90)  BP: 116/57 (16 Sep 2021 05:48) (98/51 - 116/57)  BP(mean): --  RR: 20 (16 Sep 2021 05:48) (18 - 20)  SpO2: --    General:  [ x  ] WD/ WN male, NAD, Resting Comfortable,   [   ] other:                                HEENT: [  x ] NC/AT, EOMI, PERRL , Normal Conjunctivae,   [   ] other:  Cardio: [ x  ] RRR, no murmer,   [   ] other:                              Pulm: [ x  ] No Respiratory Distress,  Lungs CTAB,   [   ] other:                       Abdomen: [ x  ]ND/NT, Soft,   [   ] other:    : [ x  ] NO JIMENEZ CATHETER, [ } JIMENEZ CATHETER- no meatal tear, no discharge, [   ] other:                                            MSK: [ x  ] No joint swelling, Full ROM,   [  x ] other:   s/p amputation all toes right foot, 1st two toes left foot                                    Ext: [ x  ]No C/C/E, No calf tenderness,   [   ]other:    Skin: [ x  ]intact,   [   ] other:                                                                   Neurological Examination:  Cognitive: [  x  ] AAO x 3,   [    ]  other:                                                                      Attention:  [    ] intact,   [  x  ]  other:  needs redirection, tangential                                                                                            Communication: [  x  ]Fluent, no dysarthria, following commands:  [  x  ] other: hypophonic, tangential speech, redirectable  CN II - XII:  [  x  ] intact,  [    ] other:                                                                                        Motor:   RIGHT UE: [ x  ] WNL,  [   ] other:  LEFT    UE: [ x  ] WNL,  [   ] other:  RIGHT LE: [ x  ] WNL,  [   ] other:   LEFT    LE: [ x  ] WNL,  [   ] other:    Tone: [  x  ] wnl,   [    ]  other:  DTRs: [  x ]symmetric, [   ] other:  Coordination:   [   x ] intact,   [    ] other:                                                                           Sensory: [    ] Intact to light touch,   [  x  ] other:  decreased in a stocking-glove distribution    CLOF   Rolling S   Sit to lying    Lying to edge of bed S   Sit to stand S   Car transfers TA   Gait: Amb 150 ft w RW CG  Footwear PA  Shower PA     MEDICATIONS  (STANDING):  ALBUTerol    90 MICROgram(s) HFA Inhaler 2 Puff(s) Inhalation every 6 hours  aspirin  chewable 81 milliGRAM(s) Oral daily  atorvastatin 40 milliGRAM(s) Oral at bedtime  chlorhexidine 4% Liquid 1 Application(s) Topical <User Schedule>  clopidogrel Tablet 75 milliGRAM(s) Oral daily  dextrose 40% Gel 15 Gram(s) Oral once  dextrose 5%. 1000 milliLiter(s) (50 mL/Hr) IV Continuous <Continuous>  dextrose 5%. 1000 milliLiter(s) (100 mL/Hr) IV Continuous <Continuous>  dextrose 50% Injectable 25 Gram(s) IV Push once  doxazosin 4 milliGRAM(s) Oral at bedtime  enoxaparin Injectable 40 milliGRAM(s) SubCutaneous daily  glucagon  Injectable 1 milliGRAM(s) IntraMuscular once  insulin glargine Injectable (LANTUS) 30 Unit(s) SubCutaneous at bedtime  insulin lispro (ADMELOG) corrective regimen sliding scale   SubCutaneous three times a day before meals  insulin lispro Injectable (ADMELOG) 7 Unit(s) SubCutaneous three times a day before meals  lacosamide 100 milliGRAM(s) Oral every 12 hours  levETIRAcetam 1000 milliGRAM(s) Oral two times a day  lisinopril 5 milliGRAM(s) Oral daily  magnesium oxide 400 milliGRAM(s) Oral <User Schedule>  metFORMIN 1000 milliGRAM(s) Oral two times a day with meals  multivitamin 1 Tablet(s) Oral daily  OLANZapine 5 milliGRAM(s) Oral at bedtime  pantoprazole   Suspension 40 milliGRAM(s) Oral daily  senna 2 Tablet(s) Oral at bedtime  sitaGLIPtin 50 milliGRAM(s) Oral <User Schedule>  tiotropium 18 MICROgram(s) Capsule 1 Capsule(s) Inhalation daily    MEDICATIONS  (PRN):  acetaminophen   Tablet .. 650 milliGRAM(s) Oral every 6 hours PRN Temp greater or equal to 38C (100.4F), Mild Pain (1 - 3)  aluminum hydroxide/magnesium hydroxide/simethicone Suspension 30 milliLiter(s) Oral every 4 hours PRN Dyspepsia  benzocaine 20% Spray 1 Spray(s) Topical every 6 hours PRN sore throat  labetalol Injectable 10 milliGRAM(s) IV Push every 6 hours PRN Systolic blood pressure >  magnesium hydroxide Suspension 30 milliLiter(s) Oral daily PRN Constipation  melatonin 3 milliGRAM(s) Oral at bedtime PRN Insomnia      Labs/Imaging             Urinalysis Basic - ( 16 Sep 2021 06:30 )    Color: Light Yellow / Appearance: Clear / S.013 / pH: x  Gluc: x / Ketone: Negative  / Bili: Negative / Urobili: <2 mg/dL   Blood: x / Protein: Trace / Nitrite: Negative   Leuk Esterase: Small / RBC: 2 /HPF / WBC 15 /HPF   Sq Epi: x / Non Sq Epi: 1 /HPF / Bacteria: Negative      POCT Blood Glucose.: 171 mg/dL (21 @ 07:20)  POCT Blood Glucose.: 158 mg/dL (09-15-21 @ 21:05)  POCT Blood Glucose.: 119 mg/dL (09-15-21 @ 16:29)  POCT Blood Glucose.: 102 mg/dL (09-15-21 @ 10:10)  POCT Blood Glucose.: 138 mg/dL (09-15-21 @ 07:13)  POCT Blood Glucose.: 249 mg/dL (21 @ 20:53)  POCT Blood Glucose.: 270 mg/dL (21 @ 17:35)  POCT Blood Glucose.: 151 mg/dL (21 @ 12:08)  POCT Blood Glucose.: 225 mg/dL (21 @ 07:21)  POCT Blood Glucose.: 207 mg/dL (21 @ 22:09)  POCT Blood Glucose.: 188 mg/dL (21 @ 20:40)  POCT Blood Glucose.: 115 mg/dL (21 @ 16:30)

## 2021-09-17 LAB
GLUCOSE BLDC GLUCOMTR-MCNC: 125 MG/DL — HIGH (ref 70–99)
GLUCOSE BLDC GLUCOMTR-MCNC: 188 MG/DL — HIGH (ref 70–99)
GLUCOSE BLDC GLUCOMTR-MCNC: 68 MG/DL — LOW (ref 70–99)
GLUCOSE BLDC GLUCOMTR-MCNC: 74 MG/DL — SIGNIFICANT CHANGE UP (ref 70–99)
GLUCOSE BLDC GLUCOMTR-MCNC: 91 MG/DL — SIGNIFICANT CHANGE UP (ref 70–99)

## 2021-09-17 RX ADMIN — METFORMIN HYDROCHLORIDE 1000 MILLIGRAM(S): 850 TABLET ORAL at 08:02

## 2021-09-17 RX ADMIN — Medication 4 MILLIGRAM(S): at 22:26

## 2021-09-17 RX ADMIN — ALBUTEROL 2 PUFF(S): 90 AEROSOL, METERED ORAL at 02:47

## 2021-09-17 RX ADMIN — Medication 7 UNIT(S): at 08:02

## 2021-09-17 RX ADMIN — LEVETIRACETAM 1000 MILLIGRAM(S): 250 TABLET, FILM COATED ORAL at 18:16

## 2021-09-17 RX ADMIN — Medication 1 TABLET(S): at 11:59

## 2021-09-17 RX ADMIN — MAGNESIUM OXIDE 400 MG ORAL TABLET 400 MILLIGRAM(S): 241.3 TABLET ORAL at 12:00

## 2021-09-17 RX ADMIN — INSULIN GLARGINE 30 UNIT(S): 100 INJECTION, SOLUTION SUBCUTANEOUS at 22:30

## 2021-09-17 RX ADMIN — ATORVASTATIN CALCIUM 40 MILLIGRAM(S): 80 TABLET, FILM COATED ORAL at 22:26

## 2021-09-17 RX ADMIN — Medication 7 UNIT(S): at 11:55

## 2021-09-17 RX ADMIN — SENNA PLUS 2 TABLET(S): 8.6 TABLET ORAL at 22:27

## 2021-09-17 RX ADMIN — ENOXAPARIN SODIUM 40 MILLIGRAM(S): 100 INJECTION SUBCUTANEOUS at 11:59

## 2021-09-17 RX ADMIN — Medication 81 MILLIGRAM(S): at 11:59

## 2021-09-17 RX ADMIN — MAGNESIUM OXIDE 400 MG ORAL TABLET 400 MILLIGRAM(S): 241.3 TABLET ORAL at 22:26

## 2021-09-17 RX ADMIN — LACOSAMIDE 100 MILLIGRAM(S): 50 TABLET ORAL at 06:19

## 2021-09-17 RX ADMIN — METFORMIN HYDROCHLORIDE 1000 MILLIGRAM(S): 850 TABLET ORAL at 18:16

## 2021-09-17 RX ADMIN — LEVETIRACETAM 1000 MILLIGRAM(S): 250 TABLET, FILM COATED ORAL at 06:19

## 2021-09-17 RX ADMIN — CLOPIDOGREL BISULFATE 75 MILLIGRAM(S): 75 TABLET, FILM COATED ORAL at 12:01

## 2021-09-17 RX ADMIN — LACOSAMIDE 100 MILLIGRAM(S): 50 TABLET ORAL at 18:14

## 2021-09-17 RX ADMIN — MAGNESIUM OXIDE 400 MG ORAL TABLET 400 MILLIGRAM(S): 241.3 TABLET ORAL at 18:14

## 2021-09-17 RX ADMIN — MAGNESIUM OXIDE 400 MG ORAL TABLET 400 MILLIGRAM(S): 241.3 TABLET ORAL at 08:01

## 2021-09-17 RX ADMIN — PANTOPRAZOLE SODIUM 40 MILLIGRAM(S): 20 TABLET, DELAYED RELEASE ORAL at 12:00

## 2021-09-17 RX ADMIN — LISINOPRIL 5 MILLIGRAM(S): 2.5 TABLET ORAL at 06:19

## 2021-09-17 RX ADMIN — CHLORHEXIDINE GLUCONATE 1 APPLICATION(S): 213 SOLUTION TOPICAL at 06:20

## 2021-09-17 RX ADMIN — OLANZAPINE 5 MILLIGRAM(S): 15 TABLET, FILM COATED ORAL at 22:26

## 2021-09-17 NOTE — PROGRESS NOTE ADULT - ASSESSMENT
ASSESSMENT/PLAN    Rehab of Acute Left Occipital Infarcts, s/p  Metabolic Encephalopathy s/p Status Epilepticus, with gait disorder and mental status changes. Good acute rehab candidate. Requires acute inpatient rehab and hospital setting to monitor blood sugars, monitor for further seizure activity and monitor mental status with Neurology f/u as needed.  - Patient requires 3 hrs daily of interdisciplinary inpatient rehab at least 5 days a week.   - c/w aspirin   - c/w plavix     S/P Status Epilepticus  - continue Lacosamide 100 mg bid and levetiracetam 1000 mg po bid   - Monitor  - no clinical sign of seizure. No recent facial twitching noted    DM 2, s/p DKA, HHS  - FS glucose better today, 100s, on glargine 25 units and lispro 7 units TID AC   - sliding scale prn   - lispro 7 u with meals    -glargine 30 qHs    s/p Pneumonia, s/p Respiratory Failure  - likely aspiration  - s/p antibiotics    Urinary Retention  - lubin out 9/14/2021   - now urinating independently   - on cardura 4mg po nightly       HTN  - monitor on current meds  - controlled    Diabetic Neuropathy    History of Osteomyelitis, likely Diabetic Ulcers  - s/p remote amputations right toes, and recently left 1st and 2nd toes. Stable    History of Bipolar Depression  - Neuropsych Eval  - c/w olanzapine 5 mg po qhs   - cooperating with therapies, though refuses meds or labs at times  - Psychiatry consult if unstable    Anemia  - chronic disease.   - iron 54   - TIBC 145  - Fe sat 37%  - monitor    HLD  - Atorvastatin    Maintenance   GI - pantoprazole  BM - senna 2 tabs po qhs, magnesium hydroxide 30 ml po qd prn for constipation   sleep- melatonin 3 mg po qhs prn for insomnia   -FEN - monitor lytes, intake  - Diet: soft, DASH, carb controlled    Precautions / PROPHYLAXIS:    - Falls  - Ortho: Weight bearing status: WBAT  - DVT prophylaxis:  Lovenox

## 2021-09-17 NOTE — PROGRESS NOTE ADULT - SUBJECTIVE AND OBJECTIVE BOX
Patient is a 71y old  Male who presents with a chief complaint of stroke (09 Sep 2021 15:18)    HPI:  History of Present Illness:   72 y/o M with pmh of DM 2, osteomyelitis, gerd, depression, HLD, HTN presenting to the ED for altered mental status.  The patient was doing well the night prior to admission, but on 21, was noted to be unable to talk and walk independently. As per his ex wife, the right sided then started shaking. She called 911.   On presentation the patient had status epilepticus and he received 4 mg of lorazepam.   CT brain was done and an acute stroke could not be excluded because of technical difficulties.  His blood glucose was found to be more than 600. He admits to eating 2 hot dogs, french fries and a milk shake prior to the episode, but does not remember being brought to the hospital.    Pt admitted to ICU with DKA/HHS. Had seizures consistent with tonic-clonic seizure x2 in ED on . Pt was loaded with 3g Keppra and transferred to MICU where he was started on an insulin drip. He was subsequently intubated for airway protection and started on Versed gtt. Course was complicated by respiratory failure, pneumonia, encephalopathy, metabolic derangements. A Jimenez Catheter was placed for urinary retention. He was treated with Vanco and Aztreonam for presumed aspiration pneumonia from intubation. Tox screen was positive for Benzos, though he denies taking drugs. Pt was extubated on . Pt was placed on VEEG which was negative for seizure. However, Vimpat was added to regime due to changes on EEG. MRI on  revealed punctate acute infarcts involving L occipital cortex. CT Head on 9/3 revealed effusions consistent with mastoiditis. A Loop Recorder was placed on 21.  His ex-wife said that he has Bipolar Depression and he was on a medication, although this is not found in his record. She also does not recall his meds.    He was seen by PT and OT on the medical service. He requires min assistance to transfer bed to chair and CG to ambulate short distances and requires mod to max assistance for most ADL activities. He was evaluated by me on the medical service and was found to be a good acute inpatient rehab candidate.      TODAY'S SUBJECTIVE & REVIEW OF SYMPTOMS:  Patient seen and examined this AM with Dr Matta. pt voiding independently with no remaining volume on PVR.     ROS   Constiutional:    [ x  ] WNL           [   ] poor appetite   [   ] insomnia   [ x  ] tired/weak   ENT:                     [   ]                   [ x ]   voice change since intubation   Cardio:                [ x  ] WNL           [   ] CP   [   ] PHILLIPS   [   ] palpitations               Resp:                   [ x  ] WNL           [   ] SOB   [   ] cough   [   ] wheezing   GI:                        [ x  ] WNL           [   ] constipation   [   ] diarrhea   [   ] abdominal pain   [   ] nausea   [   ] emesis                                :                      [  x ] WNL           [   ] JIMENEZ  [   ] dysuria   [  ] difficulty voiding             Endo:                   [ x  ] WNL          [   ] polyuria   [   ] temperature intolerance                 Skin:                     [ x  ] WNL          [   ] pain   [   ] wound   [   ] rash   MSK:                    [   ] WNL          [   ] muscle pain   [   ] joint pain/ stiffness   [   ] muscle tenderness   [   ] swelling  [ x ] multiple toe amputations right foot 10 years ago, 1st 2 toes left foot this year   Neuro:                 [  x ] WNL          [   ] HA   [   ] change in vision   [   ] tremor   [   ] weakness   [   ]dysphagia              Cognitive:           [ x  ] WNL           [   ]confusion      Psych:                  [   ] WNL           [   ] hallucinations   [   ]agitation   [   ] delusion   [ x  ] bipolar depression    PHYSICAL EXAM  Vital Signs Last 24 Hrs  T(C): 36.7 (17 Sep 2021 06:20), Max: 36.7 (17 Sep 2021 06:20)  T(F): 98.1 (17 Sep 2021 06:20), Max: 98.1 (17 Sep 2021 06:20)  HR: 95 (17 Sep 2021 06:20) (85 - 95)  BP: 116/55 (17 Sep 2021 06:20) (116/55 - 134/60)  BP(mean): --  RR: 17 (17 Sep 2021 06:20) (17 - 18)  SpO2: --    General:  [ x  ] WD/ WN male, NAD, Resting Comfortable,   [   ] other:                                HEENT: [  x ] NC/AT, EOMI, PERRL , Normal Conjunctivae,   [   ] other:  Cardio: [ x  ] RRR, no murmur,   [   ] other:                              Pulm: [ x  ] No Respiratory Distress,  Lungs CTAB,   [   ] other:                       Abdomen: [ x  ]ND/NT, Soft,   [   ] other:    : [ x  ] NO JIMENEZ CATHETER, [ } JIMENEZ CATHETER- no meatal tear, no discharge, [   ] other:                                            MSK: [ x  ] No joint swelling, Full ROM,   [  x ] other:   s/p amputation all toes right foot, 1st two toes left foot                                    Ext: [ x  ]No C/C/E, No calf tenderness,   [   ]other:    Skin: [ x  ]intact,   [   ] other:                                                                   Neurological Examination:  Cognitive: [  x  ] AAO x 3,   [    ]  other:                                                                      Attention:  [    ] intact,   [  x  ]  other:  needs redirection, tangential                                                                                            Communication: [  x  ]Fluent, no dysarthria, following commands:  [  x  ] other: hypophonic, tangential speech, redirectable  CN II - XII:  [  x  ] intact,  [    ] other:                                                                                        Motor:   RIGHT UE: [ x  ] WNL,  [   ] other:  LEFT    UE: [ x  ] WNL,  [   ] other:  RIGHT LE: [ x  ] WNL,  [   ] other:   LEFT    LE: [ x  ] WNL,  [   ] other:    Tone: [  x  ] wnl,   [    ]  other:  DTRs: [  x ]symmetric, [   ] other:  Coordination:   [   x ] intact,   [    ] other:                                                                           Sensory: [    ] Intact to light touch,   [  x  ] other:  decreased in a stocking-glove distribution    CLOF   Rolling S   Sit to lying    Lying to edge of bed S   Sit to stand S   Car transfers TA   Gait: Amb 150 ft w RW CG  Footwear PA  Shower PA   MEDICATIONS  (STANDING):  ALBUTerol    90 MICROgram(s) HFA Inhaler 2 Puff(s) Inhalation every 6 hours  aspirin  chewable 81 milliGRAM(s) Oral daily  atorvastatin 40 milliGRAM(s) Oral at bedtime  chlorhexidine 4% Liquid 1 Application(s) Topical <User Schedule>  clopidogrel Tablet 75 milliGRAM(s) Oral daily  dextrose 40% Gel 15 Gram(s) Oral once  dextrose 5%. 1000 milliLiter(s) (50 mL/Hr) IV Continuous <Continuous>  dextrose 5%. 1000 milliLiter(s) (100 mL/Hr) IV Continuous <Continuous>  dextrose 50% Injectable 25 Gram(s) IV Push once  doxazosin 4 milliGRAM(s) Oral at bedtime  enoxaparin Injectable 40 milliGRAM(s) SubCutaneous daily  glucagon  Injectable 1 milliGRAM(s) IntraMuscular once  insulin glargine Injectable (LANTUS) 30 Unit(s) SubCutaneous at bedtime  insulin lispro (ADMELOG) corrective regimen sliding scale   SubCutaneous three times a day before meals  insulin lispro Injectable (ADMELOG) 7 Unit(s) SubCutaneous three times a day before meals  lacosamide 100 milliGRAM(s) Oral every 12 hours  levETIRAcetam 1000 milliGRAM(s) Oral two times a day  lisinopril 5 milliGRAM(s) Oral daily  magnesium oxide 400 milliGRAM(s) Oral <User Schedule>  metFORMIN 1000 milliGRAM(s) Oral two times a day with meals  multivitamin 1 Tablet(s) Oral daily  OLANZapine 5 milliGRAM(s) Oral at bedtime  pantoprazole   Suspension 40 milliGRAM(s) Oral daily  senna 2 Tablet(s) Oral at bedtime  sitaGLIPtin 50 milliGRAM(s) Oral <User Schedule>  tiotropium 18 MICROgram(s) Capsule 1 Capsule(s) Inhalation daily    MEDICATIONS  (PRN):  acetaminophen   Tablet .. 650 milliGRAM(s) Oral every 6 hours PRN Temp greater or equal to 38C (100.4F), Mild Pain (1 - 3)  aluminum hydroxide/magnesium hydroxide/simethicone Suspension 30 milliLiter(s) Oral every 4 hours PRN Dyspepsia  benzocaine 20% Spray 1 Spray(s) Topical every 6 hours PRN sore throat  labetalol Injectable 10 milliGRAM(s) IV Push every 6 hours PRN Systolic blood pressure >  magnesium hydroxide Suspension 30 milliLiter(s) Oral daily PRN Constipation  melatonin 3 milliGRAM(s) Oral at bedtime PRN Insomnia        Labs/Imaging   Urinalysis Basic - ( 16 Sep 2021 06:30 )    Color: Light Yellow / Appearance: Clear / S.013 / pH: x  Gluc: x / Ketone: Negative  / Bili: Negative / Urobili: <2 mg/dL   Blood: x / Protein: Trace / Nitrite: Negative   Leuk Esterase: Small / RBC: 2 /HPF / WBC 15 /HPF   Sq Epi: x / Non Sq Epi: 1 /HPF / Bacteria: Negative      POCT Blood Glucose.: 91 mg/dL (21 @ 07:23)  POCT Blood Glucose.: 107 mg/dL (21 @ 21:42)  POCT Blood Glucose.: 106 mg/dL (21 @ 15:55)  POCT Blood Glucose.: 191 mg/dL (21 @ 11:23)  POCT Blood Glucose.: 171 mg/dL (21 @ 07:20)  POCT Blood Glucose.: 158 mg/dL (09-15-21 @ 21:05)  POCT Blood Glucose.: 119 mg/dL (09-15-21 @ 16:29)  POCT Blood Glucose.: 102 mg/dL (09-15-21 @ 10:10)  POCT Blood Glucose.: 138 mg/dL (09-15-21 @ 07:13)  POCT Blood Glucose.: 249 mg/dL (21 @ 20:53)  POCT Blood Glucose.: 270 mg/dL (21 @ 17:35)  POCT Blood Glucose.: 151 mg/dL (21 @ 12:08)   Patient is a 71y old  Male who presents with a chief complaint of stroke (09 Sep 2021 15:18)    HPI:  History of Present Illness:   72 y/o M with pmh of DM 2, osteomyelitis, gerd, depression, HLD, HTN presenting to the ED for altered mental status.  The patient was doing well the night prior to admission, but on 21, was noted to be unable to talk and walk independently. As per his ex wife, the right sided then started shaking. She called 911.   On presentation the patient had status epilepticus and he received 4 mg of lorazepam.   CT brain was done and an acute stroke could not be excluded because of technical difficulties.  His blood glucose was found to be more than 600. He admits to eating 2 hot dogs, french fries and a milk shake prior to the episode, but does not remember being brought to the hospital.    Pt admitted to ICU with DKA/HHS. Had seizures consistent with tonic-clonic seizure x2 in ED on . Pt was loaded with 3g Keppra and transferred to MICU where he was started on an insulin drip. He was subsequently intubated for airway protection and started on Versed gtt. Course was complicated by respiratory failure, pneumonia, encephalopathy, metabolic derangements. A Jimenez Catheter was placed for urinary retention. He was treated with Vanco and Aztreonam for presumed aspiration pneumonia from intubation. Tox screen was positive for Benzos, though he denies taking drugs. Pt was extubated on . Pt was placed on VEEG which was negative for seizure. However, Vimpat was added to regime due to changes on EEG. MRI on  revealed punctate acute infarcts involving L occipital cortex. CT Head on 9/3 revealed effusions consistent with mastoiditis. A Loop Recorder was placed on 21.  His ex-wife said that he has Bipolar Depression and he was on a medication, although this is not found in his record. She also does not recall his meds.    He was seen by PT and OT on the medical service. He requires min assistance to transfer bed to chair and CG to ambulate short distances and requires mod to max assistance for most ADL activities. He was evaluated by me on the medical service and was found to be a good acute inpatient rehab candidate.      TODAY'S SUBJECTIVE & REVIEW OF SYMPTOMS:  Patient seen and examined this AM with Dr Matta. pt voiding independently with no remaining volume on PVR.     ROS   Constiutional:    [ x  ] WNL           [   ] poor appetite   [   ] insomnia   [ x  ] tired/weak   ENT:                     [   ]                   [ x ]   voice change since intubation   Cardio:                [ x  ] WNL           [   ] CP   [   ] PHILLIPS   [   ] palpitations               Resp:                   [ x  ] WNL           [   ] SOB   [   ] cough   [   ] wheezing   GI:                        [ x  ] WNL           [   ] constipation   [   ] diarrhea   [   ] abdominal pain   [   ] nausea   [   ] emesis                                :                      [  x ] WNL           [   ] JIMENEZ  [   ] dysuria   [  ] difficulty voiding             Endo:                   [ x  ] WNL          [   ] polyuria   [   ] temperature intolerance                 Skin:                     [ x  ] WNL          [   ] pain   [   ] wound   [   ] rash   MSK:                    [   ] WNL          [   ] muscle pain   [   ] joint pain/ stiffness   [   ] muscle tenderness   [   ] swelling  [ x ] multiple toe amputations right foot 10 years ago, 1st 2 toes left foot this year   Neuro:                 [  x ] WNL          [   ] HA   [   ] change in vision   [   ] tremor   [   ] weakness   [   ]dysphagia              Cognitive:           [ x  ] WNL           [   ]confusion      Psych:                  [   ] WNL           [   ] hallucinations   [   ]agitation   [   ] delusion   [ x  ] bipolar depression    PHYSICAL EXAM  Vital Signs Last 24 Hrs  T(C): 36.7 (17 Sep 2021 06:20), Max: 36.7 (17 Sep 2021 06:20)  T(F): 98.1 (17 Sep 2021 06:20), Max: 98.1 (17 Sep 2021 06:20)  HR: 95 (17 Sep 2021 06:20) (85 - 95)  BP: 116/55 (17 Sep 2021 06:20) (116/55 - 134/60)  BP(mean): --  RR: 17 (17 Sep 2021 06:20) (17 - 18)  SpO2: --    General:  [ x  ] WD/ WN male, NAD, Resting Comfortable,   [   ] other:                                HEENT: [  x ] NC/AT, EOMI, PERRL , Normal Conjunctivae,   [   ] other:  Cardio: [ x  ] RRR, no murmur,   [   ] other:                              Pulm: [ x  ] No Respiratory Distress,  Lungs CTAB,   [   ] other:                       Abdomen: [ x  ]ND/NT, Soft,   [   ] other:    : [ x  ] NO JIMENEZ CATHETER, [ } JIMENEZ CATHETER- no meatal tear, no discharge, [   ] other:                                            MSK: [ x  ] No joint swelling, Full ROM,   [  x ] other:   s/p amputation all toes right foot, 1st two toes left foot                                    Ext: [ x  ]No C/C/E, No calf tenderness,   [   ]other:    Skin: [ x  ]intact,   [   ] other:                                                                   Neurological Examination:  Cognitive: [  x  ] AAO x 3,   [    ]  other:                                                                      Attention:  [    ] intact,   [  x  ]  other:  needs redirection, tangential                                                                                            Communication: [  x  ]Fluent, no dysarthria, following commands:  [  x  ] other: hypophonic, tangential speech, redirectable  CN II - XII:  [  x  ] intact,  [    ] other:                                                                                        Motor:   RIGHT UE: [ x  ] WNL,  [   ] other:  LEFT    UE: [ x  ] WNL,  [   ] other:  RIGHT LE: [ x  ] WNL,  [   ] other:   LEFT    LE: [ x  ] WNL,  [   ] other:    Tone: [  x  ] wnl,   [    ]  other:  DTRs: [  x ]symmetric, [   ] other:  Coordination:   [   x ] intact,   [    ] other:                                                                           Sensory: [    ] Intact to light touch,   [  x  ] other:  decreased in a stocking-glove distribution    CLOF   bed mobility and transfers supervision   Gait: Amb 150 ft w RW supervision, 4 steps x2 touch assist   Footwear PA  Shower PA     MEDICATIONS  (STANDING):  ALBUTerol    90 MICROgram(s) HFA Inhaler 2 Puff(s) Inhalation every 6 hours  aspirin  chewable 81 milliGRAM(s) Oral daily  atorvastatin 40 milliGRAM(s) Oral at bedtime  chlorhexidine 4% Liquid 1 Application(s) Topical <User Schedule>  clopidogrel Tablet 75 milliGRAM(s) Oral daily  dextrose 40% Gel 15 Gram(s) Oral once  dextrose 5%. 1000 milliLiter(s) (50 mL/Hr) IV Continuous <Continuous>  dextrose 5%. 1000 milliLiter(s) (100 mL/Hr) IV Continuous <Continuous>  dextrose 50% Injectable 25 Gram(s) IV Push once  doxazosin 4 milliGRAM(s) Oral at bedtime  enoxaparin Injectable 40 milliGRAM(s) SubCutaneous daily  glucagon  Injectable 1 milliGRAM(s) IntraMuscular once  insulin glargine Injectable (LANTUS) 30 Unit(s) SubCutaneous at bedtime  insulin lispro (ADMELOG) corrective regimen sliding scale   SubCutaneous three times a day before meals  insulin lispro Injectable (ADMELOG) 7 Unit(s) SubCutaneous three times a day before meals  lacosamide 100 milliGRAM(s) Oral every 12 hours  levETIRAcetam 1000 milliGRAM(s) Oral two times a day  lisinopril 5 milliGRAM(s) Oral daily  magnesium oxide 400 milliGRAM(s) Oral <User Schedule>  metFORMIN 1000 milliGRAM(s) Oral two times a day with meals  multivitamin 1 Tablet(s) Oral daily  OLANZapine 5 milliGRAM(s) Oral at bedtime  pantoprazole   Suspension 40 milliGRAM(s) Oral daily  senna 2 Tablet(s) Oral at bedtime  sitaGLIPtin 50 milliGRAM(s) Oral <User Schedule>  tiotropium 18 MICROgram(s) Capsule 1 Capsule(s) Inhalation daily    MEDICATIONS  (PRN):  acetaminophen   Tablet .. 650 milliGRAM(s) Oral every 6 hours PRN Temp greater or equal to 38C (100.4F), Mild Pain (1 - 3)  aluminum hydroxide/magnesium hydroxide/simethicone Suspension 30 milliLiter(s) Oral every 4 hours PRN Dyspepsia  benzocaine 20% Spray 1 Spray(s) Topical every 6 hours PRN sore throat  labetalol Injectable 10 milliGRAM(s) IV Push every 6 hours PRN Systolic blood pressure >  magnesium hydroxide Suspension 30 milliLiter(s) Oral daily PRN Constipation  melatonin 3 milliGRAM(s) Oral at bedtime PRN Insomnia        Labs/Imaging   Urinalysis Basic - ( 16 Sep 2021 06:30 )    Color: Light Yellow / Appearance: Clear / S.013 / pH: x  Gluc: x / Ketone: Negative  / Bili: Negative / Urobili: <2 mg/dL   Blood: x / Protein: Trace / Nitrite: Negative   Leuk Esterase: Small / RBC: 2 /HPF / WBC 15 /HPF   Sq Epi: x / Non Sq Epi: 1 /HPF / Bacteria: Negative      POCT Blood Glucose.: 91 mg/dL (21 @ 07:23)  POCT Blood Glucose.: 107 mg/dL (21 @ 21:42)  POCT Blood Glucose.: 106 mg/dL (21 @ 15:55)  POCT Blood Glucose.: 191 mg/dL (21 @ 11:23)  POCT Blood Glucose.: 171 mg/dL (21 @ 07:20)  POCT Blood Glucose.: 158 mg/dL (09-15-21 @ 21:05)  POCT Blood Glucose.: 119 mg/dL (09-15-21 @ 16:29)  POCT Blood Glucose.: 102 mg/dL (09-15-21 @ 10:10)  POCT Blood Glucose.: 138 mg/dL (09-15-21 @ 07:13)  POCT Blood Glucose.: 249 mg/dL (21 @ 20:53)  POCT Blood Glucose.: 270 mg/dL (21 @ 17:35)  POCT Blood Glucose.: 151 mg/dL (21 @ 12:08)   Patient is a 71y old  Male who presents with a chief complaint of stroke (09 Sep 2021 15:18)    HPI:  History of Present Illness:   72 y/o M with pmh of DM 2, osteomyelitis, gerd, depression, HLD, HTN presenting to the ED for altered mental status.  The patient was doing well the night prior to admission, but on 21, was noted to be unable to talk and walk independently. As per his ex wife, the right sided then started shaking. She called 911.   On presentation the patient had status epilepticus and he received 4 mg of lorazepam.   CT brain was done and an acute stroke could not be excluded because of technical difficulties.  His blood glucose was found to be more than 600. He admits to eating 2 hot dogs, french fries and a milk shake prior to the episode, but does not remember being brought to the hospital.    Pt admitted to ICU with DKA/HHS. Had seizures consistent with tonic-clonic seizure x2 in ED on . Pt was loaded with 3g Keppra and transferred to MICU where he was started on an insulin drip. He was subsequently intubated for airway protection and started on Versed gtt. Course was complicated by respiratory failure, pneumonia, encephalopathy, metabolic derangements. A Jimenez Catheter was placed for urinary retention. He was treated with Vanco and Aztreonam for presumed aspiration pneumonia from intubation. Tox screen was positive for Benzos, though he denies taking drugs. Pt was extubated on . Pt was placed on VEEG which was negative for seizure. However, Vimpat was added to regime due to changes on EEG. MRI on  revealed punctate acute infarcts involving L occipital cortex. CT Head on 9/3 revealed effusions consistent with mastoiditis. A Loop Recorder was placed on 21.  His ex-wife said that he has Bipolar Depression and he was on a medication, although this is not found in his record. She also does not recall his meds.    He was seen by PT and OT on the medical service. He requires min assistance to transfer bed to chair and CG to ambulate short distances and requires mod to max assistance for most ADL activities. He was evaluated by me on the medical service and was found to be a good acute inpatient rehab candidate.      TODAY'S SUBJECTIVE & REVIEW OF SYMPTOMS:  Patient seen and examined this AM with Dr Matta. pt voiding independently with no remaining volume on PVR x 24 hrs.    ROS   Constiutional:    [ x  ] WNL           [   ] poor appetite   [   ] insomnia   [ x  ] tired/weak   ENT:                     [   ]                   [ x ]   voice change since intubation   Cardio:                [ x  ] WNL           [   ] CP   [   ] PHILLIPS   [   ] palpitations               Resp:                   [ x  ] WNL           [   ] SOB   [   ] cough   [   ] wheezing   GI:                        [ x  ] WNL           [   ] constipation   [   ] diarrhea   [   ] abdominal pain   [   ] nausea   [   ] emesis                                :                      [  x ] WNL           [   ] JIMENEZ  [   ] dysuria   [  ] difficulty voiding             Endo:                   [ x  ] WNL          [   ] polyuria   [   ] temperature intolerance                 Skin:                     [ x  ] WNL          [   ] pain   [   ] wound   [   ] rash   MSK:                    [   ] WNL          [   ] muscle pain   [   ] joint pain/ stiffness   [   ] muscle tenderness   [   ] swelling  [ x ] multiple toe amputations right foot 10 years ago, 1st 2 toes left foot this year   Neuro:                 [  x ] WNL          [   ] HA   [   ] change in vision   [   ] tremor   [   ] weakness   [   ]dysphagia              Cognitive:           [ x  ] WNL           [   ]confusion      Psych:                  [   ] WNL           [   ] hallucinations   [   ]agitation   [   ] delusion   [ x  ] bipolar depression    PHYSICAL EXAM  Vital Signs Last 24 Hrs  T(C): 36.7 (17 Sep 2021 06:20), Max: 36.7 (17 Sep 2021 06:20)  T(F): 98.1 (17 Sep 2021 06:20), Max: 98.1 (17 Sep 2021 06:20)  HR: 95 (17 Sep 2021 06:20) (85 - 95)  BP: 116/55 (17 Sep 2021 06:20) (116/55 - 134/60)  BP(mean): --  RR: 17 (17 Sep 2021 06:20) (17 - 18)  SpO2: --    General:  [ x  ] WD/ WN male, NAD, Resting Comfortable,   [   ] other:                                HEENT: [  x ] NC/AT, EOMI, PERRL , Normal Conjunctivae,   [   ] other:  Cardio: [ x  ] RRR, no murmur,   [   ] other:                              Pulm: [ x  ] No Respiratory Distress,  Lungs CTAB,   [   ] other:                       Abdomen: [ x  ] ND/NT, Soft,   [   ] other:    : [ x  ] NO JIMENEZ CATHETER, [ } JIMENEZ CATHETER- no meatal tear, no discharge, [   ] other:                                            MSK: [ x  ] No joint swelling, Full ROM,   [  x ] other:   s/p amputation all toes right foot, 1st two toes left foot                                    Ext: [ x  ]No C/C/E, No calf tenderness,   [   ]other:    Skin: [ x  ]intact,   [   ] other:                                                                   Neurological Examination:  Cognitive: [  x  ] AAO x 3,   [    ]  other:                                                                      Attention:  [    ] intact,   [  x  ]  other:  needs redirection, tangential                                                                                            Communication: [  x  ]Fluent, no dysarthria, following commands:  [  x  ] other: hypophonic, tangential speech, redirectable  CN II - XII:  [  x  ] intact,  [    ] other:                                                                                        Motor:   RIGHT UE: [ x  ] WNL,  [   ] other:  LEFT    UE: [ x  ] WNL,  [   ] other:  RIGHT LE: [ x  ] WNL,  [   ] other:   LEFT    LE: [ x  ] WNL,  [   ] other:    Tone: [  x  ] wnl,   [    ]  other:  DTRs: [  x ]symmetric, [   ] other:  Coordination:   [   x ] intact,   [    ] other:                                                                           Sensory: [    ] Intact to light touch,   [  x  ] other:  decreased in a stocking-glove distribution    CLOF   bed mobility and transfers supervision   Gait: Amb 150 ft w RW supervision, 4 steps x2 touch assist   Footwear PA  Shower PA     MEDICATIONS  (STANDING):  ALBUTerol    90 MICROgram(s) HFA Inhaler 2 Puff(s) Inhalation every 6 hours  aspirin  chewable 81 milliGRAM(s) Oral daily  atorvastatin 40 milliGRAM(s) Oral at bedtime  chlorhexidine 4% Liquid 1 Application(s) Topical <User Schedule>  clopidogrel Tablet 75 milliGRAM(s) Oral daily  dextrose 40% Gel 15 Gram(s) Oral once  dextrose 5%. 1000 milliLiter(s) (50 mL/Hr) IV Continuous <Continuous>  dextrose 5%. 1000 milliLiter(s) (100 mL/Hr) IV Continuous <Continuous>  dextrose 50% Injectable 25 Gram(s) IV Push once  doxazosin 4 milliGRAM(s) Oral at bedtime  enoxaparin Injectable 40 milliGRAM(s) SubCutaneous daily  glucagon  Injectable 1 milliGRAM(s) IntraMuscular once  insulin glargine Injectable (LANTUS) 30 Unit(s) SubCutaneous at bedtime  insulin lispro (ADMELOG) corrective regimen sliding scale   SubCutaneous three times a day before meals  insulin lispro Injectable (ADMELOG) 7 Unit(s) SubCutaneous three times a day before meals  lacosamide 100 milliGRAM(s) Oral every 12 hours  levETIRAcetam 1000 milliGRAM(s) Oral two times a day  lisinopril 5 milliGRAM(s) Oral daily  magnesium oxide 400 milliGRAM(s) Oral <User Schedule>  metFORMIN 1000 milliGRAM(s) Oral two times a day with meals  multivitamin 1 Tablet(s) Oral daily  OLANZapine 5 milliGRAM(s) Oral at bedtime  pantoprazole   Suspension 40 milliGRAM(s) Oral daily  senna 2 Tablet(s) Oral at bedtime  sitaGLIPtin 50 milliGRAM(s) Oral <User Schedule>  tiotropium 18 MICROgram(s) Capsule 1 Capsule(s) Inhalation daily    MEDICATIONS  (PRN):  acetaminophen   Tablet .. 650 milliGRAM(s) Oral every 6 hours PRN Temp greater or equal to 38C (100.4F), Mild Pain (1 - 3)  aluminum hydroxide/magnesium hydroxide/simethicone Suspension 30 milliLiter(s) Oral every 4 hours PRN Dyspepsia  benzocaine 20% Spray 1 Spray(s) Topical every 6 hours PRN sore throat  labetalol Injectable 10 milliGRAM(s) IV Push every 6 hours PRN Systolic blood pressure >  magnesium hydroxide Suspension 30 milliLiter(s) Oral daily PRN Constipation  melatonin 3 milliGRAM(s) Oral at bedtime PRN Insomnia        Labs/Imaging   Urinalysis Basic - ( 16 Sep 2021 06:30 )    Color: Light Yellow / Appearance: Clear / S.013 / pH: x  Gluc: x / Ketone: Negative  / Bili: Negative / Urobili: <2 mg/dL   Blood: x / Protein: Trace / Nitrite: Negative   Leuk Esterase: Small / RBC: 2 /HPF / WBC 15 /HPF   Sq Epi: x / Non Sq Epi: 1 /HPF / Bacteria: Negative      POCT Blood Glucose.: 91 mg/dL (21 @ 07:23)  POCT Blood Glucose.: 107 mg/dL (21 @ 21:42)  POCT Blood Glucose.: 106 mg/dL (21 @ 15:55)  POCT Blood Glucose.: 191 mg/dL (21 @ 11:23)  POCT Blood Glucose.: 171 mg/dL (21 @ 07:20)  POCT Blood Glucose.: 158 mg/dL (09-15-21 @ 21:05)  POCT Blood Glucose.: 119 mg/dL (09-15-21 @ 16:29)  POCT Blood Glucose.: 102 mg/dL (09-15-21 @ 10:10)  POCT Blood Glucose.: 138 mg/dL (09-15-21 @ 07:13)  POCT Blood Glucose.: 249 mg/dL (21 @ 20:53)  POCT Blood Glucose.: 270 mg/dL (21 @ 17:35)  POCT Blood Glucose.: 151 mg/dL (21 @ 12:08)

## 2021-09-17 NOTE — PROGRESS NOTE ADULT - ASSESSMENT
Pain: Denied Location: N/A   Ratin/10     Intervention: N/A  Orientation: Self /Place/ Event /Month/Year/Day/ Date/ Time  Arousal Level: Alert  Behavior: Pleasant and cooperative  Affect Range:  WFL     Needed: ? No   #: N/A  Attention: ? WFL   Insight into illness/deficits: Good  Treatment Session Focused on Mood/ Coping/Cognition    Patient was seen to follow-up on mood, coping, and attempt for cognitive testing. Patient declined cognitive testing, as he began his grooming routine and his daughter was visiting. Patient once again indicated good spirits, and reported a positive attitude towards his therapies and the progress he has made while in rehab. Patient indicated he specifically feels more confident in his speech; Patient was recognized to have a clearer pronunciation and speaking in a louder volume when compared to 9/10/2021. He also had reported some difficulty still with memory; however, he personally believes this has improved since his hospital admission. Patient’s daughter requested education regarding nutrition following his discharge. Patient and Daughter were provided with a reference page for the "mind diet" and instructed where to find additional information. Discussion was held emphasizing the diet is not all inclusive or preventative. Patient agreed to cognitive testing on 2021 and is scheduled to be discharged on 2021.     Goals: ? Facilitate Positive Coping ? Family Support / Education ? Cognitive Assessment   Plan: 1-2 times a week 30-60 minutes

## 2021-09-17 NOTE — PROGRESS NOTE ADULT - ATTENDING COMMENTS
I reviewed the chart and examined the patient with the resident and we discussed the findings and treatment plan.  The patient is tolerating the rehab program well. I agree with the findings and treatment plan above, which I modified as indicated. The patient requires 3 hrs a day of acute inpatient rehab. Transfers and ambulates with RW with CS. Needs assistance for ADL. Planning on d/c for early next week.  Patient w/o complaints. VSS. BP better controlled.  retention appears to have resolved. Need to monitor.  I read, edited and agree with the Assessment:   cervical myelopathy, Multilevel cervical spondylosis and stenosis, ataxia, central cord syndrome   - s/p C3-6 Posterior Decompression with C2-6 Stabilization and Fusion 9/10  - Admit to inpatient rehab in [facility] on  9/16/2021   - maintain hard c-collar     -   - reports difficulty initiating urinary stream. Monitor.     Neck pain  Tylenol prn; pain is better postop.    COPD   - ALBUTerol    0.083% 2.5 milliGRAM(s) Nebulizer every 6 hours  - budesonide 160 MICROgram(s)/formoterol 4.5 MICROgram(s) Inhaler 2 Puff(s) Inhalation two times a day  - tiotropium 18 MICROgram(s) Capsule 1 Capsule(s) Inhalation daily    HLD   - c/w atorvastatin 80 milliGRAM(s) Oral at bedtime    DM Type II   home meds   AISS  POC glucose TID AC   monitor glucose and adjust insulin prn     HTN   - BP goal 120-140  home meds amlodipine and losartan     CAD, s/p LAD stents in 2013    c/w home med ranolazine 500 milliGRAM(s) Oral two times a day for angina     -Pain control: tylenol prn, methocarbamol 500 mg po every 8 hours PRN, percocet prn   -GI/Bowel Mgmt: senna, miralax   -Bladder management: monitor   -Skin:No active issues at this time. monitor incision  -FEN: monitor electrolytes     Diet, DASH/TLC:   Sodium & Cholesterol Restricted  Consistent Carbohydrate No Snacks (09-16-21 @ 16:55) [Active]      Precautions / PROPHYLAXIS:    - Falls  - Weight bearing status: as tolerated   - DVT prophylaxis: SQ Heparin

## 2021-09-18 LAB
GLUCOSE BLDC GLUCOMTR-MCNC: 106 MG/DL — HIGH (ref 70–99)
GLUCOSE BLDC GLUCOMTR-MCNC: 123 MG/DL — HIGH (ref 70–99)
GLUCOSE BLDC GLUCOMTR-MCNC: 161 MG/DL — HIGH (ref 70–99)
GLUCOSE BLDC GLUCOMTR-MCNC: 71 MG/DL — SIGNIFICANT CHANGE UP (ref 70–99)

## 2021-09-18 RX ORDER — INSULIN LISPRO 100/ML
3 VIAL (ML) SUBCUTANEOUS
Refills: 0 | Status: DISCONTINUED | OUTPATIENT
Start: 2021-09-18 | End: 2021-09-21

## 2021-09-18 RX ADMIN — LACOSAMIDE 100 MILLIGRAM(S): 50 TABLET ORAL at 06:59

## 2021-09-18 RX ADMIN — MAGNESIUM OXIDE 400 MG ORAL TABLET 400 MILLIGRAM(S): 241.3 TABLET ORAL at 08:18

## 2021-09-18 RX ADMIN — LISINOPRIL 5 MILLIGRAM(S): 2.5 TABLET ORAL at 06:59

## 2021-09-18 RX ADMIN — LACOSAMIDE 100 MILLIGRAM(S): 50 TABLET ORAL at 17:55

## 2021-09-18 RX ADMIN — Medication 4 MILLIGRAM(S): at 22:37

## 2021-09-18 RX ADMIN — MAGNESIUM OXIDE 400 MG ORAL TABLET 400 MILLIGRAM(S): 241.3 TABLET ORAL at 11:26

## 2021-09-18 RX ADMIN — LEVETIRACETAM 1000 MILLIGRAM(S): 250 TABLET, FILM COATED ORAL at 16:53

## 2021-09-18 RX ADMIN — CLOPIDOGREL BISULFATE 75 MILLIGRAM(S): 75 TABLET, FILM COATED ORAL at 11:26

## 2021-09-18 RX ADMIN — ATORVASTATIN CALCIUM 40 MILLIGRAM(S): 80 TABLET, FILM COATED ORAL at 22:36

## 2021-09-18 RX ADMIN — CHLORHEXIDINE GLUCONATE 1 APPLICATION(S): 213 SOLUTION TOPICAL at 07:00

## 2021-09-18 RX ADMIN — LEVETIRACETAM 1000 MILLIGRAM(S): 250 TABLET, FILM COATED ORAL at 06:59

## 2021-09-18 RX ADMIN — MAGNESIUM OXIDE 400 MG ORAL TABLET 400 MILLIGRAM(S): 241.3 TABLET ORAL at 16:56

## 2021-09-18 RX ADMIN — PANTOPRAZOLE SODIUM 40 MILLIGRAM(S): 20 TABLET, DELAYED RELEASE ORAL at 11:26

## 2021-09-18 RX ADMIN — Medication 81 MILLIGRAM(S): at 11:26

## 2021-09-18 RX ADMIN — Medication 7 UNIT(S): at 08:19

## 2021-09-18 RX ADMIN — SENNA PLUS 2 TABLET(S): 8.6 TABLET ORAL at 22:36

## 2021-09-18 RX ADMIN — Medication 7 UNIT(S): at 16:53

## 2021-09-18 RX ADMIN — METFORMIN HYDROCHLORIDE 1000 MILLIGRAM(S): 850 TABLET ORAL at 16:52

## 2021-09-18 RX ADMIN — MAGNESIUM OXIDE 400 MG ORAL TABLET 400 MILLIGRAM(S): 241.3 TABLET ORAL at 22:36

## 2021-09-18 RX ADMIN — ENOXAPARIN SODIUM 40 MILLIGRAM(S): 100 INJECTION SUBCUTANEOUS at 11:26

## 2021-09-18 RX ADMIN — METFORMIN HYDROCHLORIDE 1000 MILLIGRAM(S): 850 TABLET ORAL at 08:18

## 2021-09-18 RX ADMIN — OLANZAPINE 5 MILLIGRAM(S): 15 TABLET, FILM COATED ORAL at 22:36

## 2021-09-18 RX ADMIN — INSULIN GLARGINE 30 UNIT(S): 100 INJECTION, SOLUTION SUBCUTANEOUS at 22:37

## 2021-09-18 RX ADMIN — Medication 1 TABLET(S): at 11:26

## 2021-09-18 NOTE — PROGRESS NOTE ADULT - SUBJECTIVE AND OBJECTIVE BOX
T(C): 36.4 (09-18-21 @ 05:54), Max: 36.8 (09-17-21 @ 13:11)  HR: 92 (09-18-21 @ 05:54) (87 - 92)  BP: 105/52 (09-18-21 @ 05:54) (105/52 - 150/65)  RR: 18 (09-18-21 @ 05:54) (18 - 18)  SpO2: --      Patient was stable overnight and expresses no new complaints     PE:    Alert   LUNGS- clear  COR- RRR  ABD- SOFT, NT  EXTR- w/o edema  NEURO- stable

## 2021-09-19 LAB
GLUCOSE BLDC GLUCOMTR-MCNC: 109 MG/DL — HIGH (ref 70–99)
GLUCOSE BLDC GLUCOMTR-MCNC: 130 MG/DL — HIGH (ref 70–99)
GLUCOSE BLDC GLUCOMTR-MCNC: 143 MG/DL — HIGH (ref 70–99)
GLUCOSE BLDC GLUCOMTR-MCNC: 188 MG/DL — HIGH (ref 70–99)

## 2021-09-19 RX ADMIN — Medication 1 TABLET(S): at 12:18

## 2021-09-19 RX ADMIN — LACOSAMIDE 100 MILLIGRAM(S): 50 TABLET ORAL at 18:15

## 2021-09-19 RX ADMIN — Medication 4 MILLIGRAM(S): at 22:09

## 2021-09-19 RX ADMIN — ALBUTEROL 2 PUFF(S): 90 AEROSOL, METERED ORAL at 19:30

## 2021-09-19 RX ADMIN — Medication 3 UNIT(S): at 08:09

## 2021-09-19 RX ADMIN — Medication 81 MILLIGRAM(S): at 12:18

## 2021-09-19 RX ADMIN — ENOXAPARIN SODIUM 40 MILLIGRAM(S): 100 INJECTION SUBCUTANEOUS at 12:18

## 2021-09-19 RX ADMIN — LEVETIRACETAM 1000 MILLIGRAM(S): 250 TABLET, FILM COATED ORAL at 18:15

## 2021-09-19 RX ADMIN — Medication 3 UNIT(S): at 12:18

## 2021-09-19 RX ADMIN — METFORMIN HYDROCHLORIDE 1000 MILLIGRAM(S): 850 TABLET ORAL at 18:15

## 2021-09-19 RX ADMIN — SENNA PLUS 2 TABLET(S): 8.6 TABLET ORAL at 22:09

## 2021-09-19 RX ADMIN — LEVETIRACETAM 1000 MILLIGRAM(S): 250 TABLET, FILM COATED ORAL at 06:36

## 2021-09-19 RX ADMIN — INSULIN GLARGINE 30 UNIT(S): 100 INJECTION, SOLUTION SUBCUTANEOUS at 22:10

## 2021-09-19 RX ADMIN — MAGNESIUM OXIDE 400 MG ORAL TABLET 400 MILLIGRAM(S): 241.3 TABLET ORAL at 12:18

## 2021-09-19 RX ADMIN — METFORMIN HYDROCHLORIDE 1000 MILLIGRAM(S): 850 TABLET ORAL at 08:09

## 2021-09-19 RX ADMIN — ATORVASTATIN CALCIUM 40 MILLIGRAM(S): 80 TABLET, FILM COATED ORAL at 22:09

## 2021-09-19 RX ADMIN — CHLORHEXIDINE GLUCONATE 1 APPLICATION(S): 213 SOLUTION TOPICAL at 06:35

## 2021-09-19 RX ADMIN — MAGNESIUM OXIDE 400 MG ORAL TABLET 400 MILLIGRAM(S): 241.3 TABLET ORAL at 22:09

## 2021-09-19 RX ADMIN — MAGNESIUM OXIDE 400 MG ORAL TABLET 400 MILLIGRAM(S): 241.3 TABLET ORAL at 18:15

## 2021-09-19 RX ADMIN — CLOPIDOGREL BISULFATE 75 MILLIGRAM(S): 75 TABLET, FILM COATED ORAL at 12:20

## 2021-09-19 RX ADMIN — MAGNESIUM OXIDE 400 MG ORAL TABLET 400 MILLIGRAM(S): 241.3 TABLET ORAL at 08:09

## 2021-09-19 RX ADMIN — LACOSAMIDE 100 MILLIGRAM(S): 50 TABLET ORAL at 06:36

## 2021-09-19 RX ADMIN — PANTOPRAZOLE SODIUM 40 MILLIGRAM(S): 20 TABLET, DELAYED RELEASE ORAL at 12:20

## 2021-09-19 RX ADMIN — LISINOPRIL 5 MILLIGRAM(S): 2.5 TABLET ORAL at 06:36

## 2021-09-19 RX ADMIN — OLANZAPINE 5 MILLIGRAM(S): 15 TABLET, FILM COATED ORAL at 22:09

## 2021-09-19 NOTE — PROGRESS NOTE ADULT - SUBJECTIVE AND OBJECTIVE BOX
T(C): 36.7 (09-19-21 @ 14:01), Max: 36.9 (09-19-21 @ 06:46)  HR: 92 (09-19-21 @ 14:01) (87 - 92)  BP: 117/60 (09-19-21 @ 14:01) (117/60 - 133/60)  RR: 20 (09-19-21 @ 14:01) (18 - 20)  SpO2: --      Patient was stable overnight and expresses no new complaints     PE:    Alert   LUNGS- clear  COR- RRR  ABD- SOFT, NT  EXTR- w/o edema  NEURO- stable                      Rehab of CVA    Continue acute rehab program.

## 2021-09-20 LAB
ALBUMIN SERPL ELPH-MCNC: 3.6 G/DL — SIGNIFICANT CHANGE UP (ref 3.5–5.2)
ALP SERPL-CCNC: 116 U/L — HIGH (ref 30–115)
ALT FLD-CCNC: 13 U/L — SIGNIFICANT CHANGE UP (ref 0–41)
ANION GAP SERPL CALC-SCNC: 10 MMOL/L — SIGNIFICANT CHANGE UP (ref 7–14)
AST SERPL-CCNC: 12 U/L — SIGNIFICANT CHANGE UP (ref 0–41)
BASOPHILS # BLD AUTO: 0.02 K/UL — SIGNIFICANT CHANGE UP (ref 0–0.2)
BASOPHILS NFR BLD AUTO: 0.4 % — SIGNIFICANT CHANGE UP (ref 0–1)
BILIRUB SERPL-MCNC: 0.3 MG/DL — SIGNIFICANT CHANGE UP (ref 0.2–1.2)
BUN SERPL-MCNC: 14 MG/DL — SIGNIFICANT CHANGE UP (ref 10–20)
CALCIUM SERPL-MCNC: 9 MG/DL — SIGNIFICANT CHANGE UP (ref 8.5–10.1)
CHLORIDE SERPL-SCNC: 107 MMOL/L — SIGNIFICANT CHANGE UP (ref 98–110)
CO2 SERPL-SCNC: 22 MMOL/L — SIGNIFICANT CHANGE UP (ref 17–32)
CREAT SERPL-MCNC: 0.8 MG/DL — SIGNIFICANT CHANGE UP (ref 0.7–1.5)
EOSINOPHIL # BLD AUTO: 0.09 K/UL — SIGNIFICANT CHANGE UP (ref 0–0.7)
EOSINOPHIL NFR BLD AUTO: 1.6 % — SIGNIFICANT CHANGE UP (ref 0–8)
GLUCOSE BLDC GLUCOMTR-MCNC: 124 MG/DL — HIGH (ref 70–99)
GLUCOSE BLDC GLUCOMTR-MCNC: 161 MG/DL — HIGH (ref 70–99)
GLUCOSE BLDC GLUCOMTR-MCNC: 166 MG/DL — HIGH (ref 70–99)
GLUCOSE BLDC GLUCOMTR-MCNC: 229 MG/DL — HIGH (ref 70–99)
GLUCOSE SERPL-MCNC: 237 MG/DL — HIGH (ref 70–99)
HCT VFR BLD CALC: 30.2 % — LOW (ref 42–52)
HGB BLD-MCNC: 9.3 G/DL — LOW (ref 14–18)
IMM GRANULOCYTES NFR BLD AUTO: 0.9 % — HIGH (ref 0.1–0.3)
LYMPHOCYTES # BLD AUTO: 0.74 K/UL — LOW (ref 1.2–3.4)
LYMPHOCYTES # BLD AUTO: 13.3 % — LOW (ref 20.5–51.1)
MAGNESIUM SERPL-MCNC: 1.8 MG/DL — SIGNIFICANT CHANGE UP (ref 1.8–2.4)
MCHC RBC-ENTMCNC: 28.7 PG — SIGNIFICANT CHANGE UP (ref 27–31)
MCHC RBC-ENTMCNC: 30.8 G/DL — LOW (ref 32–37)
MCV RBC AUTO: 93.2 FL — SIGNIFICANT CHANGE UP (ref 80–94)
MONOCYTES # BLD AUTO: 0.54 K/UL — SIGNIFICANT CHANGE UP (ref 0.1–0.6)
MONOCYTES NFR BLD AUTO: 9.7 % — HIGH (ref 1.7–9.3)
NEUTROPHILS # BLD AUTO: 4.14 K/UL — SIGNIFICANT CHANGE UP (ref 1.4–6.5)
NEUTROPHILS NFR BLD AUTO: 74.1 % — SIGNIFICANT CHANGE UP (ref 42.2–75.2)
NRBC # BLD: 0 /100 WBCS — SIGNIFICANT CHANGE UP (ref 0–0)
PLATELET # BLD AUTO: 248 K/UL — SIGNIFICANT CHANGE UP (ref 130–400)
POTASSIUM SERPL-MCNC: 4.9 MMOL/L — SIGNIFICANT CHANGE UP (ref 3.5–5)
POTASSIUM SERPL-SCNC: 4.9 MMOL/L — SIGNIFICANT CHANGE UP (ref 3.5–5)
PROT SERPL-MCNC: 5.8 G/DL — LOW (ref 6–8)
RBC # BLD: 3.24 M/UL — LOW (ref 4.7–6.1)
RBC # FLD: 13.7 % — SIGNIFICANT CHANGE UP (ref 11.5–14.5)
SARS-COV-2 RNA SPEC QL NAA+PROBE: SIGNIFICANT CHANGE UP
SODIUM SERPL-SCNC: 139 MMOL/L — SIGNIFICANT CHANGE UP (ref 135–146)
WBC # BLD: 5.58 K/UL — SIGNIFICANT CHANGE UP (ref 4.8–10.8)
WBC # FLD AUTO: 5.58 K/UL — SIGNIFICANT CHANGE UP (ref 4.8–10.8)

## 2021-09-20 RX ADMIN — ENOXAPARIN SODIUM 40 MILLIGRAM(S): 100 INJECTION SUBCUTANEOUS at 11:47

## 2021-09-20 RX ADMIN — LEVETIRACETAM 1000 MILLIGRAM(S): 250 TABLET, FILM COATED ORAL at 06:41

## 2021-09-20 RX ADMIN — LACOSAMIDE 100 MILLIGRAM(S): 50 TABLET ORAL at 19:12

## 2021-09-20 RX ADMIN — ALBUTEROL 2 PUFF(S): 90 AEROSOL, METERED ORAL at 20:03

## 2021-09-20 RX ADMIN — Medication 3 UNIT(S): at 12:08

## 2021-09-20 RX ADMIN — Medication 3 UNIT(S): at 17:22

## 2021-09-20 RX ADMIN — ATORVASTATIN CALCIUM 40 MILLIGRAM(S): 80 TABLET, FILM COATED ORAL at 22:03

## 2021-09-20 RX ADMIN — LEVETIRACETAM 1000 MILLIGRAM(S): 250 TABLET, FILM COATED ORAL at 17:21

## 2021-09-20 RX ADMIN — Medication 4 MILLIGRAM(S): at 22:03

## 2021-09-20 RX ADMIN — PANTOPRAZOLE SODIUM 40 MILLIGRAM(S): 20 TABLET, DELAYED RELEASE ORAL at 11:47

## 2021-09-20 RX ADMIN — Medication 81 MILLIGRAM(S): at 11:47

## 2021-09-20 RX ADMIN — LACOSAMIDE 100 MILLIGRAM(S): 50 TABLET ORAL at 06:42

## 2021-09-20 RX ADMIN — MAGNESIUM OXIDE 400 MG ORAL TABLET 400 MILLIGRAM(S): 241.3 TABLET ORAL at 22:03

## 2021-09-20 RX ADMIN — LISINOPRIL 5 MILLIGRAM(S): 2.5 TABLET ORAL at 06:41

## 2021-09-20 RX ADMIN — Medication 1 TABLET(S): at 11:47

## 2021-09-20 RX ADMIN — Medication 3 UNIT(S): at 08:32

## 2021-09-20 RX ADMIN — CLOPIDOGREL BISULFATE 75 MILLIGRAM(S): 75 TABLET, FILM COATED ORAL at 11:48

## 2021-09-20 RX ADMIN — CHLORHEXIDINE GLUCONATE 1 APPLICATION(S): 213 SOLUTION TOPICAL at 06:42

## 2021-09-20 RX ADMIN — MAGNESIUM OXIDE 400 MG ORAL TABLET 400 MILLIGRAM(S): 241.3 TABLET ORAL at 08:06

## 2021-09-20 RX ADMIN — METFORMIN HYDROCHLORIDE 1000 MILLIGRAM(S): 850 TABLET ORAL at 08:06

## 2021-09-20 RX ADMIN — INSULIN GLARGINE 30 UNIT(S): 100 INJECTION, SOLUTION SUBCUTANEOUS at 22:19

## 2021-09-20 RX ADMIN — MAGNESIUM OXIDE 400 MG ORAL TABLET 400 MILLIGRAM(S): 241.3 TABLET ORAL at 11:47

## 2021-09-20 RX ADMIN — MAGNESIUM OXIDE 400 MG ORAL TABLET 400 MILLIGRAM(S): 241.3 TABLET ORAL at 17:20

## 2021-09-20 RX ADMIN — OLANZAPINE 5 MILLIGRAM(S): 15 TABLET, FILM COATED ORAL at 22:03

## 2021-09-20 RX ADMIN — METFORMIN HYDROCHLORIDE 1000 MILLIGRAM(S): 850 TABLET ORAL at 17:20

## 2021-09-20 NOTE — PROGRESS NOTE ADULT - SUBJECTIVE AND OBJECTIVE BOX
Patient is a 71y old  Male who presents with a chief complaint of stroke (09 Sep 2021 15:18)    HPI:  History of Present Illness:   70 y/o M with pmh of DM 2, osteomyelitis, gerd, depression, HLD, HTN presenting to the ED for altered mental status.  The patient was doing well the night prior to admission, but on 8/24/21, was noted to be unable to talk and walk independently. As per his ex wife, the right sided then started shaking. She called [phone].   On presentation the patient had status epilepticus and he received 4 mg of lorazepam.   CT brain was done and an acute stroke could not be excluded because of technical difficulties.  His blood glucose was found to be more than 600. He admits to eating 2 hot dogs, french fries and a milk shake prior to the episode, but does not remember being brought to the hospital.    Pt admitted to ICU with DKA/HHS. Had seizures consistent with tonic-clonic seizure x2 in ED on 8/24. Pt was loaded with 3g Keppra and transferred to MICU where he was started on an insulin drip. He was subsequently intubated for airway protection and started on Versed gtt. Course was complicated by respiratory failure, pneumonia, encephalopathy, metabolic derangements. A Jimenez Catheter was placed for urinary retention. He was treated with Vanco and Aztreonam for presumed aspiration pneumonia from intubation. Tox screen was positive for Benzos, though he denies taking drugs. Pt was extubated on 8/31. Pt was placed on VEEG which was negative for seizure. However, Vimpat was added to regime due to changes on EEG. MRI on 8/26 revealed punctate acute infarcts involving L occipital cortex. CT Head on 9/3 revealed effusions consistent with mastoiditis. A Loop Recorder was placed on 9/8/21.  His ex-wife said that he has Bipolar Depression and he was on a medication, although this is not found in his record. She also does not recall his meds.    He was seen by PT and OT on the medical service. He requires min assistance to transfer bed to chair and CG to ambulate short distances and requires mod to max assistance for most ADL activities. He was evaluated by me on the medical service and was found to be a good acute inpatient rehab candidate.      TODAY'S SUBJECTIVE & REVIEW OF SYMPTOMS:  Patient seen and examined this AM with Dr Goncalves. pt is pleasant no complaints     ROS   Constiutional:    [ x  ] WNL           [   ] poor appetite   [   ] insomnia   [ x  ] tired/weak   ENT:                     [   ]                   [ x ]   voice change since intubation   Cardio:                [ x  ] WNL           [   ] CP   [   ] PHILLIPS   [   ] palpitations               Resp:                   [ x  ] WNL           [   ] SOB   [   ] cough   [   ] wheezing   GI:                        [ x  ] WNL           [   ] constipation   [   ] diarrhea   [   ] abdominal pain   [   ] nausea   [   ] emesis                                :                      [  x ] WNL           [   ] JIMENEZ  [   ] dysuria   [  ] difficulty voiding             Endo:                   [ x  ] WNL          [   ] polyuria   [   ] temperature intolerance                 Skin:                     [ x  ] WNL          [   ] pain   [   ] wound   [   ] rash   MSK:                    [   ] WNL          [   ] muscle pain   [   ] joint pain/ stiffness   [   ] muscle tenderness   [   ] swelling  [ x ] multiple toe amputations right foot 10 years ago, 1st 2 toes left foot this year   Neuro:                 [  x ] WNL          [   ] HA   [   ] change in vision   [   ] tremor   [   ] weakness   [   ]dysphagia              Cognitive:           [ x  ] WNL           [   ]confusion      Psych:                  [   ] WNL           [   ] hallucinations   [   ]agitation   [   ] delusion   [ x  ] bipolar depression    PHYSICAL EXAM  ICU Vital Signs Last 24 Hrs  T(C): 37 (20 Sep 2021 05:14), Max: 37 (20 Sep 2021 05:14)  T(F): 98.6 (20 Sep 2021 05:14), Max: 98.6 (20 Sep 2021 05:14)  HR: 88 (20 Sep 2021 05:14) (88 - 92)  BP: 106/54 (20 Sep 2021 05:14) (106/54 - 120/62)    General:  [ x  ] WD/ WN male, NAD, Resting Comfortable,   [   ] other:                                HEENT: [  x ] NC/AT, EOMI, PERRL , Normal Conjunctivae,   [   ] other:  Cardio: [ x  ] RRR, no murmur,   [   ] other:                              Pulm: [ x  ] No Respiratory Distress,  Lungs CTAB,   [   ] other:                       Abdomen: [ x  ] ND/NT, Soft,   [   ] other:    : [ x  ] NO JIMENEZ CATHETER, [ } JIMENEZ CATHETER- no meatal tear, no discharge, [   ] other:                                            MSK: [ x  ] No joint swelling, Full ROM,   [  x ] other:   s/p amputation all toes right foot, 1st two toes left foot                                    Ext: [ x  ]No C/C/E, No calf tenderness,   [   ]other:    Skin: [ x  ]intact,   [   ] other:                                                                   Neurological Examination:  Cognitive: [  x  ] AAO x 3,   [    ]  other:                                                                      Attention:  [    ] intact,   [  x  ]  other:  needs redirection, tangential                                                                                            Communication: [  x  ]Fluent, no dysarthria, following commands:  [  x  ] other: hypophonic, tangential speech, redirectable  CN II - XII:  [  x  ] intact,  [    ] other:                                                                                        Motor:   RIGHT UE: [ x  ] WNL,  [   ] other:  LEFT    UE: [ x  ] WNL,  [   ] other:  RIGHT LE: [ x  ] WNL,  [   ] other:   LEFT    LE: [ x  ] WNL,  [   ] other:    Tone: [  x  ] wnl,   [    ]  other:  DTRs: [  x ]symmetric, [   ] other:  Coordination:   [   x ] intact,   [    ] other:                                                                           Sensory: [    ] Intact to light touch,   [  x  ] other:  decreased in a stocking-glove distribution    CLOF   bed mobility  independent     transfers supervision   Ambulation 50 RW independent  150 ft w RW supervision, 4 steps x2 touch assist, curbs ramps touch assist      MEDICATIONS  (STANDING):  ALBUTerol    90 MICROgram(s) HFA Inhaler 2 Puff(s) Inhalation every 6 hours  aspirin  chewable 81 milliGRAM(s) Oral daily  atorvastatin 40 milliGRAM(s) Oral at bedtime  chlorhexidine 4% Liquid 1 Application(s) Topical <User Schedule>  clopidogrel Tablet 75 milliGRAM(s) Oral daily  dextrose 40% Gel 15 Gram(s) Oral once  dextrose 5%. 1000 milliLiter(s) (50 mL/Hr) IV Continuous <Continuous>  dextrose 5%. 1000 milliLiter(s) (100 mL/Hr) IV Continuous <Continuous>  dextrose 50% Injectable 25 Gram(s) IV Push once  doxazosin 4 milliGRAM(s) Oral at bedtime  enoxaparin Injectable 40 milliGRAM(s) SubCutaneous daily  glucagon  Injectable 1 milliGRAM(s) IntraMuscular once  insulin glargine Injectable (LANTUS) 30 Unit(s) SubCutaneous at bedtime  insulin lispro Injectable (ADMELOG) 3 Unit(s) SubCutaneous three times a day with meals  lacosamide 100 milliGRAM(s) Oral every 12 hours  levETIRAcetam 1000 milliGRAM(s) Oral two times a day  lisinopril 5 milliGRAM(s) Oral daily  magnesium oxide 400 milliGRAM(s) Oral <User Schedule>  metFORMIN 1000 milliGRAM(s) Oral two times a day with meals  multivitamin 1 Tablet(s) Oral daily  OLANZapine 5 milliGRAM(s) Oral at bedtime  pantoprazole   Suspension 40 milliGRAM(s) Oral daily  senna 2 Tablet(s) Oral at bedtime  sitaGLIPtin 50 milliGRAM(s) Oral <User Schedule>  tiotropium 18 MICROgram(s) Capsule 1 Capsule(s) Inhalation daily    MEDICATIONS  (PRN):  acetaminophen   Tablet .. 650 milliGRAM(s) Oral every 6 hours PRN Temp greater or equal to 38C (100.4F), Mild Pain (1 - 3)  aluminum hydroxide/magnesium hydroxide/simethicone Suspension 30 milliLiter(s) Oral every 4 hours PRN Dyspepsia  benzocaine 20% Spray 1 Spray(s) Topical every 6 hours PRN sore throat  labetalol Injectable 10 milliGRAM(s) IV Push every 6 hours PRN Systolic blood pressure >  magnesium hydroxide Suspension 30 milliLiter(s) Oral daily PRN Constipation  melatonin 3 milliGRAM(s) Oral at bedtime PRN Insomnia          Labs/Imaging                         9.3    5.58  )-----------( 248      ( 20 Sep 2021 08:14 )             30.2     09-20    139  |  107  |  14  ----------------------------<  237<H>  4.9   |  22  |  0.8    Ca    9.0      20 Sep 2021 08:14  Mg     1.8     09-20    TPro  5.8<L>  /  Alb  3.6  /  TBili  0.3  /  DBili  x   /  AST  12  /  ALT  13  /  AlkPhos  116<H>  09-20        POCT Blood Glucose.: 229 mg/dL (09-20-21 @ 07:53)  POCT Blood Glucose.: 188 mg/dL (09-19-21 @ 20:54)  POCT Blood Glucose.: 109 mg/dL (09-19-21 @ 17:40)  POCT Blood Glucose.: 143 mg/dL (09-19-21 @ 11:49)  POCT Blood Glucose.: 130 mg/dL (09-19-21 @ 07:05)  POCT Blood Glucose.: 161 mg/dL (09-18-21 @ 21:16)  POCT Blood Glucose.: 106 mg/dL (09-18-21 @ 15:55)  POCT Blood Glucose.: 71 mg/dL (09-18-21 @ 10:55)  POCT Blood Glucose.: 123 mg/dL (09-18-21 @ 07:24)  POCT Blood Glucose.: 188 mg/dL (09-17-21 @ 22:08)  POCT Blood Glucose.: 74 mg/dL (09-17-21 @ 17:33)  POCT Blood Glucose.: 68 mg/dL (09-17-21 @ 17:08)

## 2021-09-20 NOTE — PROGRESS NOTE ADULT - ATTENDING COMMENTS
Patient seen, examined and discussed with the resident. I have directed the care. He is improving. requires acute rehab with 3 hours of daily therapies.  Rehab of Acute Left Occipital Infarcts, s/p  Metabolic Encephalopathy s/p Status Epilepticus, with gait disorder and mental status changes. Good acute rehab candidate. Requires acute inpatient rehab and hospital setting to monitor blood sugars, monitor for further seizure activity and monitor mental status with Neurology f/u as needed.  - Patient requires 3 hrs daily of interdisciplinary inpatient rehab at least 5 days a week.   - c/w aspirin   - c/w plavix     S/P Status Epilepticus  - continue Lacosamide 100 mg bid and levetiracetam 1000 mg po bid   - Monitor  - no clinical sign of seizure. No recent facial twitching noted    DM 2, s/p DKA, HHS  - FS glucose better today, 100s, on glargine 25 units and lispro 7 units TID AC   - sliding scale prn   - lispro 7 u with meals    -glargine 30 qHs    s/p Pneumonia, s/p Respiratory Failure  - likely aspiration  - s/p antibiotics    Urinary Retention  - lubin out 9/14/2021   - now urinating independently   - on cardura 4mg po nightly       HTN  - monitor on current meds  - controlled    Diabetic Neuropathy    History of Osteomyelitis, likely Diabetic Ulcers  - s/p remote amputations right toes, and recently left 1st and 2nd toes. Stable    History of Bipolar Depression  - Neuropsych Eval  - c/w olanzapine 5 mg po qhs   - cooperating with therapies, though refuses meds or labs at times  - Psychiatry consult if unstable    Anemia  - chronic disease.   - iron 54   - TIBC 145  - Fe sat 37%  - monitor    HLD  - Atorvastatin    Maintenance   GI - pantoprazole  BM - senna 2 tabs po qhs, magnesium hydroxide 30 ml po qd prn for constipation   sleep- melatonin 3 mg po qhs prn for insomnia   -FEN - monitor lytes, intake  - Diet: soft, DASH, carb controlled    Precautions / PROPHYLAXIS:    - Falls  - Ortho: Weight bearing status: WBAT  - DVT prophylaxis:  Lovenox

## 2021-09-21 ENCOUNTER — TRANSCRIPTION ENCOUNTER (OUTPATIENT)
Age: 71
End: 2021-09-21

## 2021-09-21 VITALS
HEART RATE: 94 BPM | DIASTOLIC BLOOD PRESSURE: 57 MMHG | SYSTOLIC BLOOD PRESSURE: 109 MMHG | RESPIRATION RATE: 18 BRPM | TEMPERATURE: 98 F

## 2021-09-21 PROBLEM — E11.40 TYPE 2 DIABETES MELLITUS WITH DIABETIC NEUROPATHY, UNSPECIFIED: Chronic | Status: ACTIVE | Noted: 2021-09-09

## 2021-09-21 PROBLEM — E11.9 TYPE 2 DIABETES MELLITUS WITHOUT COMPLICATIONS: Chronic | Status: ACTIVE | Noted: 2018-12-26

## 2021-09-21 LAB
GLUCOSE BLDC GLUCOMTR-MCNC: 105 MG/DL — HIGH (ref 70–99)
GLUCOSE BLDC GLUCOMTR-MCNC: 112 MG/DL — HIGH (ref 70–99)

## 2021-09-21 RX ORDER — OMEPRAZOLE 10 MG/1
1 CAPSULE, DELAYED RELEASE ORAL
Qty: 30 | Refills: 0
Start: 2021-09-21 | End: 2021-10-20

## 2021-09-21 RX ORDER — SITAGLIPTIN AND METFORMIN HYDROCHLORIDE 500; 50 MG/1; MG/1
1 TABLET, FILM COATED ORAL
Qty: 60 | Refills: 0
Start: 2021-09-21 | End: 2021-10-20

## 2021-09-21 RX ORDER — ASPIRIN/CALCIUM CARB/MAGNESIUM 324 MG
1 TABLET ORAL
Qty: 100 | Refills: 0
Start: 2021-09-21

## 2021-09-21 RX ORDER — LANOLIN ALCOHOL/MO/W.PET/CERES
1 CREAM (GRAM) TOPICAL
Qty: 0 | Refills: 0 | DISCHARGE
Start: 2021-09-21

## 2021-09-21 RX ORDER — MAGNESIUM OXIDE 400 MG ORAL TABLET 241.3 MG
1 TABLET ORAL
Qty: 90 | Refills: 0
Start: 2021-09-21 | End: 2021-10-20

## 2021-09-21 RX ORDER — ATORVASTATIN CALCIUM 80 MG/1
1 TABLET, FILM COATED ORAL
Qty: 30 | Refills: 0
Start: 2021-09-21 | End: 2021-10-20

## 2021-09-21 RX ORDER — LISINOPRIL 2.5 MG/1
1 TABLET ORAL
Qty: 30 | Refills: 0
Start: 2021-09-21 | End: 2021-10-20

## 2021-09-21 RX ORDER — DOXAZOSIN MESYLATE 4 MG
1 TABLET ORAL
Qty: 30 | Refills: 0
Start: 2021-09-21 | End: 2021-10-20

## 2021-09-21 RX ORDER — OMEPRAZOLE 10 MG/1
1 CAPSULE, DELAYED RELEASE ORAL
Qty: 0 | Refills: 0 | DISCHARGE

## 2021-09-21 RX ORDER — INSULIN GLARGINE 100 [IU]/ML
30 INJECTION, SOLUTION SUBCUTANEOUS
Qty: 100 | Refills: 0
Start: 2021-09-21

## 2021-09-21 RX ORDER — INSULIN GLARGINE 100 [IU]/ML
20 INJECTION, SOLUTION SUBCUTANEOUS
Qty: 0 | Refills: 0 | DISCHARGE

## 2021-09-21 RX ORDER — CLOPIDOGREL BISULFATE 75 MG/1
1 TABLET, FILM COATED ORAL
Qty: 30 | Refills: 0
Start: 2021-09-21 | End: 2021-10-20

## 2021-09-21 RX ORDER — SENNA PLUS 8.6 MG/1
2 TABLET ORAL
Qty: 0 | Refills: 0 | DISCHARGE
Start: 2021-09-21

## 2021-09-21 RX ORDER — UMECLIDINIUM 62.5 UG/1
1 AEROSOL, POWDER ORAL
Qty: 30 | Refills: 0
Start: 2021-09-21 | End: 2021-10-20

## 2021-09-21 RX ORDER — INSULIN GLARGINE 100 [IU]/ML
30 INJECTION, SOLUTION SUBCUTANEOUS
Qty: 15 | Refills: 0
Start: 2021-09-21

## 2021-09-21 RX ORDER — TIOTROPIUM BROMIDE 18 UG/1
1 CAPSULE ORAL; RESPIRATORY (INHALATION)
Qty: 30 | Refills: 0
Start: 2021-09-21

## 2021-09-21 RX ORDER — DULAGLUTIDE 4.5 MG/.5ML
0 INJECTION, SOLUTION SUBCUTANEOUS
Qty: 0 | Refills: 0 | DISCHARGE

## 2021-09-21 RX ORDER — ALBUTEROL 90 UG/1
2 AEROSOL, METERED ORAL
Qty: 1 | Refills: 0
Start: 2021-09-21

## 2021-09-21 RX ORDER — DULAGLUTIDE 4.5 MG/.5ML
0.5 INJECTION, SOLUTION SUBCUTANEOUS
Qty: 2.1 | Refills: 0
Start: 2021-09-21 | End: 2021-10-20

## 2021-09-21 RX ORDER — SITAGLIPTIN AND METFORMIN HYDROCHLORIDE 500; 50 MG/1; MG/1
1 TABLET, FILM COATED ORAL
Qty: 0 | Refills: 0 | DISCHARGE

## 2021-09-21 RX ORDER — LACOSAMIDE 50 MG/1
1 TABLET ORAL
Qty: 60 | Refills: 0
Start: 2021-09-21 | End: 2021-10-20

## 2021-09-21 RX ORDER — LEVETIRACETAM 250 MG/1
1 TABLET, FILM COATED ORAL
Qty: 60 | Refills: 0
Start: 2021-09-21 | End: 2021-10-20

## 2021-09-21 RX ORDER — OLANZAPINE 15 MG/1
1 TABLET, FILM COATED ORAL
Qty: 30 | Refills: 0
Start: 2021-09-21 | End: 2021-10-20

## 2021-09-21 RX ADMIN — CHLORHEXIDINE GLUCONATE 1 APPLICATION(S): 213 SOLUTION TOPICAL at 05:40

## 2021-09-21 RX ADMIN — Medication 3 UNIT(S): at 08:07

## 2021-09-21 RX ADMIN — MAGNESIUM OXIDE 400 MG ORAL TABLET 400 MILLIGRAM(S): 241.3 TABLET ORAL at 08:02

## 2021-09-21 RX ADMIN — Medication 81 MILLIGRAM(S): at 12:13

## 2021-09-21 RX ADMIN — LEVETIRACETAM 1000 MILLIGRAM(S): 250 TABLET, FILM COATED ORAL at 05:40

## 2021-09-21 RX ADMIN — LACOSAMIDE 100 MILLIGRAM(S): 50 TABLET ORAL at 05:40

## 2021-09-21 RX ADMIN — Medication 1 TABLET(S): at 12:13

## 2021-09-21 RX ADMIN — MAGNESIUM OXIDE 400 MG ORAL TABLET 400 MILLIGRAM(S): 241.3 TABLET ORAL at 12:13

## 2021-09-21 RX ADMIN — PANTOPRAZOLE SODIUM 40 MILLIGRAM(S): 20 TABLET, DELAYED RELEASE ORAL at 12:13

## 2021-09-21 RX ADMIN — METFORMIN HYDROCHLORIDE 1000 MILLIGRAM(S): 850 TABLET ORAL at 08:02

## 2021-09-21 RX ADMIN — CLOPIDOGREL BISULFATE 75 MILLIGRAM(S): 75 TABLET, FILM COATED ORAL at 12:13

## 2021-09-21 RX ADMIN — LISINOPRIL 5 MILLIGRAM(S): 2.5 TABLET ORAL at 05:40

## 2021-09-21 NOTE — DISCHARGE NOTE PROVIDER - INSTRUCTIONS
Eat a high protein diabetic snack after dinner, about 2 hours before bedtime: either 1/2 sandwich or 4 ounces of plain low-fat/nonfat yogurt with fresh fruit  Eat foods that contain high levels of magnesium, such as salmon, sardines, green leafy vegetables such as spinach or kale (eat them cooked since you are still on a soft diet with no hard chewables), low fat dairy foods including yogurt

## 2021-09-21 NOTE — DISCHARGE NOTE PROVIDER - CARE PROVIDER_API CALL
Khris Shelton)  [address]  [address] [facility], [address]  [place] [address]  Phone: [phone]  Fax: [phone]  Established Patient  Follow Up Time: 2 weeks    Alec Ernst)  EEGEpilepsy; Neurology  [address] [place] [address], [address]  [place] [address]  Phone: [phone]  Fax: [phone]  Follow Up Time: 2 weeks    Te Rocha  Cardiology  [address] [place] [address]  Phone: [phone]  Fax: [phone]  Scheduled Appointment: 10/11/2021

## 2021-09-21 NOTE — PROGRESS NOTE ADULT - PROVIDER SPECIALTY LIST ADULT
Rehab Medicine
Physiatry
Neuropsychology
Neuropsychology
Physiatry
Physiatry
Rehab Medicine
Rehab Medicine
Neuropsychology
Physiatry
Neuropsychology
Physiatry

## 2021-09-21 NOTE — PROGRESS NOTE ADULT - REASON FOR ADMISSION
stroke

## 2021-09-21 NOTE — PROGRESS NOTE ADULT - ASSESSMENT
ASSESSMENT/PLAN    Rehab of Acute Left Occipital Infarcts, s/p  Metabolic Encephalopathy s/p Status Epilepticus, with gait disorder and mental status changes. Good acute rehab candidate. Requires acute inpatient rehab and hospital setting to monitor blood sugars, monitor for further seizure activity and monitor mental status with Neurology f/u as needed.  - Patient has completed in pt rehab   - c/w aspirin   - c/w plavix     S/P Status Epilepticus  - continue Lacosamide 100 mg bid and levetiracetam 1000 mg po bid   - Monitor  - no clinical sign of seizure. No recent facial twitching noted    DM 2, s/p DKA, HHS  - FS glucose better today, 100s, on glargine 25 units and lispro 7 units TID AC   - sliding scale prn   - lispro 7 u with meals    -glargine 30 qHs    s/p Pneumonia, s/p Respiratory Failure  - likely aspiration  - s/p antibiotics    Urinary Retention  - lubin out 9/14/2021   - now urinating independently   - on cardura 4mg po nightly       HTN  - monitor on current meds  - controlled    Diabetic Neuropathy    History of Osteomyelitis, likely Diabetic Ulcers  - s/p remote amputations right toes, and recently left 1st and 2nd toes. Stable    History of Bipolar Depression  - Neuropsych Eval  - c/w olanzapine 5 mg po qhs         Anemia  - chronic disease.   - iron 54   - TIBC 145  - Fe sat 37%  - monitor    HLD  - Atorvastatin    Maintenance   GI - pantoprazole  BM - senna 2 tabs po qhs, magnesium hydroxide 30 ml po qd prn for constipation   sleep- melatonin 3 mg po qhs prn for insomnia   -FEN - monitor lytes, intake  - Diet: soft, DASH, carb controlled    Precautions / PROPHYLAXIS:    - Falls  - Ortho: Weight bearing status: WBAT  - DVT prophylaxis:  Lovenox

## 2021-09-21 NOTE — DISCHARGE NOTE PROVIDER - CARE PROVIDERS DIRECT ADDRESSES
[email or web address]

## 2021-09-21 NOTE — PROGRESS NOTE ADULT - TIME BILLING
60 mins pt. and family contact
30 mins pt and family contact
Family psychotherapy without patient
60 mins patient contact

## 2021-09-21 NOTE — DISCHARGE NOTE PROVIDER - HOSPITAL COURSE
72 y/o M with pmh of DM 2, osteomyelitis, gerd, bipolar depression, HLD, HTN, presented to the ED on 8/24/21 for altered mental status.  On presentation the patient had status epilepticus and he received 4 mg of lorazepam. MRI on 8/26 revealed punctate acute infarcts involving L occipital cortex. CT Head on 9/3 revealed effusions consistent with mastoiditis. A Loop Recorder was placed on 9/8/21.  His blood glucose was found to be more than 600 on admission.  Pt was admitted to ICU with DKA/HHS. Had seizures consistent with tonic-clonic seizure x2 in ED on 8/24. Pt was loaded with 3g Keppra and transferred to MICU where he was started on an insulin drip. He was subsequently intubated for airway protection and started on Versed gtt. Course was complicated by respiratory failure, pneumonia, encephalopathy, metabolic derangements. A Lubin Catheter was placed for urinary retention. He was treated with Vanco and Aztreonam/cefepime/zosyn for presumed aspiration pneumonia from intubation.   He is taking Keppra and Vimpat for seizures.    At home he was taking Lantus, Trulicity and Janumet; A1C = 5.7 so he was previously well-controlled. Once he was stabilized, home meds were resumed, except for Trulicity, which is non-formulary at [facility]. FS improved  once home regimen was restarted. He will resume Trulicity as well when discharged home.    He was admitted to Rehab medicine service on 9/9/21, where he participated in 3 hours of interdisciplinary therapy daily at least 5 days a week.  [facility]   bed mobility  independent     transfers supervision   Ambulation 50 RW independent  150 ft w RW supervision, 4 steps x2 touch assist, curbs ramps touch assist      ASSESSMENT/PLAN:  Rehab of Acute Left Occipital Infarcts, s/p  Metabolic Encephalopathy s/p Status Epilepticus, with gait disorder and mental status changes. Good acute rehab candidate. Requires acute inpatient rehab and hospital setting to monitor blood sugars, monitor for further seizure activity and monitor mental status with Neurology f/u as needed.  - Patient has completed in pt rehab   - c/w aspirin   - c/w plavix     S/P Status Epilepticus  - continue Lacosamide 100 mg bid and levetiracetam 1000 mg po bid   - Monitor  - keep Mg++>2 as per Neuro (on mag oxide TID)  - no clinical sign of seizure. No recent facial twitching noted    DM 2, s/p DKA, HHS  - FS glucose better on home regimen:     -glargine 30 qHs, metformin 1000mg + januvia 50mg po BID with meals (resume Janumet when discharged)     -resume Trulicity 0.75mg/0.5ml SC weekly when discharged home    s/p Pneumonia, s/p Respiratory Failure  - likely aspiration  - s/p antibiotics    Urinary Retention (resolved)  - lubin out 9/14/2021; passed voiding trial  - now urinating independently   - on cardura 4mg po nightly     HTN  - monitor on current meds: lisinopril was decreased from 10 to 5mg po daily, also on Cardura 4mg po q hs  - controlled    Diabetic Neuropathy    History of Osteomyelitis, likely Diabetic Ulcers  - s/p remote amputations right toes, and recently left 1st and 2nd toes. Stable    History of Bipolar Depression  - Neuropsych Eval  - c/w olanzapine 5 mg po qhs     Anemia  - chronic disease.   - iron 54   - TIBC 145  - Fe sat 37%  - B12, folate, TSH all normal  - monitor    HLD  - Atorvastatin    Maintenance   GI - pantoprazole  BM - senna 2 tabs po qhs, magnesium hydroxide 30 ml po qd prn for constipation   sleep- melatonin 3 mg po qhs prn for insomnia   -FEN - monitor lytes, intake; keep on mag oxide 400mg po TID to keep mg++> 2 as per neurology  - Diet: soft, DASH, carb controlled    Precautions / PROPHYLAXIS:    - Falls  - Ortho: Weight bearing status: WBAT  - DVT prophylaxis:  Lovenox in hospital    The patient is being discharged home with home care on 9/21/21. He will be staying with his daughter. He will need to follow up with his PCP, Dr. Shelton, as well as with Neurology Dr. Neil, in the next 2 weeks. Message was left with Dr. Neil's office and they will contact the patient's daughter, Pamela at [phone], to arrange the appointment at [facility]roke [facility].

## 2021-09-21 NOTE — PROGRESS NOTE ADULT - SUBJECTIVE AND OBJECTIVE BOX
Patient is a 71y old  Male who presents with a chief complaint of stroke (09 Sep 2021 15:18)    HPI:  History of Present Illness:   70 y/o M with pmh of DM 2, osteomyelitis, gerd, depression, HLD, HTN presenting to the ED for altered mental status.  The patient was doing well the night prior to admission, but on 8/24/21, was noted to be unable to talk and walk independently. As per his ex wife, the right sided then started shaking. She called 911.   On presentation the patient had status epilepticus and he received 4 mg of lorazepam.   CT brain was done and an acute stroke could not be excluded because of technical difficulties.  His blood glucose was found to be more than 600. He admits to eating 2 hot dogs, french fries and a milk shake prior to the episode, but does not remember being brought to the hospital.    Pt admitted to ICU with DKA/HHS. Had seizures consistent with tonic-clonic seizure x2 in ED on 8/24. Pt was loaded with 3g Keppra and transferred to MICU where he was started on an insulin drip. He was subsequently intubated for airway protection and started on Versed gtt. Course was complicated by respiratory failure, pneumonia, encephalopathy, metabolic derangements. A Jimenez Catheter was placed for urinary retention. He was treated with Vanco and Aztreonam for presumed aspiration pneumonia from intubation. Tox screen was positive for Benzos, though he denies taking drugs. Pt was extubated on 8/31. Pt was placed on VEEG which was negative for seizure. However, Vimpat was added to regime due to changes on EEG. MRI on 8/26 revealed punctate acute infarcts involving L occipital cortex. CT Head on 9/3 revealed effusions consistent with mastoiditis. A Loop Recorder was placed on 9/8/21.  His ex-wife said that he has Bipolar Depression and he was on a medication, although this is not found in his record. She also does not recall his meds.    He was seen by PT and OT on the medical service. He requires min assistance to transfer bed to chair and CG to ambulate short distances and requires mod to max assistance for most ADL activities. He was evaluated by me on the medical service and was found to be a good acute inpatient rehab candidate.      TODAY'S SUBJECTIVE & REVIEW OF SYMPTOMS:  Patient seen and examined this AM with Dr Goncalves. Pt is excited and ready to go vegan    ROS   Constiutional:    [ x  ] WNL           [   ] poor appetite   [   ] insomnia   [ x  ] tired/weak   ENT:                     [   ]                   [ x ]   voice change since intubation   Cardio:                [ x  ] WNL           [   ] CP   [   ] PHILLIPS   [   ] palpitations               Resp:                   [ x  ] WNL           [   ] SOB   [   ] cough   [   ] wheezing   GI:                        [ x  ] WNL           [   ] constipation   [   ] diarrhea   [   ] abdominal pain   [   ] nausea   [   ] emesis                                :                      [  x ] WNL           [   ] JIMENEZ  [   ] dysuria   [  ] difficulty voiding             Endo:                   [ x  ] WNL          [   ] polyuria   [   ] temperature intolerance                 Skin:                     [ x  ] WNL          [   ] pain   [   ] wound   [   ] rash   MSK:                    [   ] WNL          [   ] muscle pain   [   ] joint pain/ stiffness   [   ] muscle tenderness   [   ] swelling  [ x ] multiple toe amputations right foot 10 years ago, 1st 2 toes left foot this year   Neuro:                 [  x ] WNL          [   ] HA   [   ] change in vision   [   ] tremor   [   ] weakness   [   ]dysphagia              Cognitive:           [ x  ] WNL           [   ]confusion      Psych:                  [   ] WNL           [   ] hallucinations   [   ]agitation   [   ] delusion   [ x  ] bipolar depression    PHYSICAL EXAM  Vital Signs Last 24 Hrs  T(C): 36.9 (21 Sep 2021 05:15), Max: 36.9 (20 Sep 2021 21:00)  T(F): 98.5 (21 Sep 2021 05:15), Max: 98.5 (20 Sep 2021 21:00)  HR: 94 (21 Sep 2021 05:15) (84 - 94)  BP: 109/57 (21 Sep 2021 05:15) (109/57 - 150/67)  BP(mean): --  RR: 18 (21 Sep 2021 05:15) (18 - 18)  SpO2: --    General:  [ x  ] WD/ WN male, NAD, Resting Comfortable,   [   ] other:                                HEENT: [  x ] NC/AT, EOMI, PERRL , Normal Conjunctivae,   [   ] other:  Cardio: [ x  ] RRR, no murmur,   [   ] other:                              Pulm: [ x  ] No Respiratory Distress,  Lungs CTAB,   [   ] other:                       Abdomen: [ x  ] ND/NT, Soft,   [   ] other:    : [ x  ] NO JIMENEZ CATHETER, [ } JIMENEZ CATHETER- no meatal tear, no discharge, [   ] other:                                            MSK: [ x  ] No joint swelling, Full ROM,   [  x ] other:   s/p amputation all toes right foot, 1st two toes left foot                                    Ext: [ x  ]No C/C/E, No calf tenderness,   [   ]other:    Skin: [ x  ]intact,   [   ] other:                                                                   Neurological Examination:  Cognitive: [  x  ] AAO x 3,   [    ]  other:                                                                      Attention:  [    ] intact,   [  x  ]  other:  needs redirection, tangential                                                                                            Communication: [  x  ]Fluent, no dysarthria, following commands:  [  x  ] other: hypophonic, tangential speech, redirectable  CN II - XII:  [  x  ] intact,  [    ] other:                                                                                        Motor:   RIGHT UE: [ x  ] WNL,  [   ] other:  LEFT    UE: [ x  ] WNL,  [   ] other:  RIGHT LE: [ x  ] WNL,  [   ] other:   LEFT    LE: [ x  ] WNL,  [   ] other:    Tone: [  x  ] wnl,   [    ]  other:  DTRs: [  x ]symmetric, [   ] other:  Coordination:   [   x ] intact,   [    ] other:                                                                           Sensory: [    ] Intact to light touch,   [  x  ] other:  decreased in a stocking-glove distribution    CLOF   bed mobility  independent     transfers supervision   Ambulation 50 RW independent  150 ft w RW supervision, 4 steps x2 touch assist, curbs ramps touch assist      MEDICATIONS  (STANDING):  ALBUTerol    90 MICROgram(s) HFA Inhaler 2 Puff(s) Inhalation every 6 hours  aspirin  chewable 81 milliGRAM(s) Oral daily  atorvastatin 40 milliGRAM(s) Oral at bedtime  chlorhexidine 4% Liquid 1 Application(s) Topical <User Schedule>  clopidogrel Tablet 75 milliGRAM(s) Oral daily  dextrose 40% Gel 15 Gram(s) Oral once  dextrose 5%. 1000 milliLiter(s) (50 mL/Hr) IV Continuous <Continuous>  dextrose 5%. 1000 milliLiter(s) (100 mL/Hr) IV Continuous <Continuous>  dextrose 50% Injectable 25 Gram(s) IV Push once  doxazosin 4 milliGRAM(s) Oral at bedtime  enoxaparin Injectable 40 milliGRAM(s) SubCutaneous daily  glucagon  Injectable 1 milliGRAM(s) IntraMuscular once  insulin glargine Injectable (LANTUS) 30 Unit(s) SubCutaneous at bedtime  insulin lispro Injectable (ADMELOG) 3 Unit(s) SubCutaneous three times a day with meals  lacosamide 100 milliGRAM(s) Oral every 12 hours  levETIRAcetam 1000 milliGRAM(s) Oral two times a day  lisinopril 5 milliGRAM(s) Oral daily  magnesium oxide 400 milliGRAM(s) Oral <User Schedule>  metFORMIN 1000 milliGRAM(s) Oral two times a day with meals  multivitamin 1 Tablet(s) Oral daily  OLANZapine 5 milliGRAM(s) Oral at bedtime  pantoprazole   Suspension 40 milliGRAM(s) Oral daily  senna 2 Tablet(s) Oral at bedtime  sitaGLIPtin 50 milliGRAM(s) Oral <User Schedule>  tiotropium 18 MICROgram(s) Capsule 1 Capsule(s) Inhalation daily    MEDICATIONS  (PRN):  acetaminophen   Tablet .. 650 milliGRAM(s) Oral every 6 hours PRN Temp greater or equal to 38C (100.4F), Mild Pain (1 - 3)  aluminum hydroxide/magnesium hydroxide/simethicone Suspension 30 milliLiter(s) Oral every 4 hours PRN Dyspepsia  benzocaine 20% Spray 1 Spray(s) Topical every 6 hours PRN sore throat  labetalol Injectable 10 milliGRAM(s) IV Push every 6 hours PRN Systolic blood pressure >  magnesium hydroxide Suspension 30 milliLiter(s) Oral daily PRN Constipation  melatonin 3 milliGRAM(s) Oral at bedtime PRN Insomnia          Labs/Imaging                         9.3    5.58  )-----------( 248      ( 20 Sep 2021 08:14 )             30.2     09-20    139  |  107  |  14  ----------------------------<  237<H>  4.9   |  22  |  0.8    Ca    9.0      20 Sep 2021 08:14  Mg     1.8     09-20    TPro  5.8<L>  /  Alb  3.6  /  TBili  0.3  /  DBili  x   /  AST  12  /  ALT  13  /  AlkPhos  116<H>  09-20        POCT Blood Glucose.: 112 mg/dL (09-21-21 @ 07:35)  POCT Blood Glucose.: 166 mg/dL (09-20-21 @ 22:08)  POCT Blood Glucose.: 124 mg/dL (09-20-21 @ 17:07)  POCT Blood Glucose.: 161 mg/dL (09-20-21 @ 11:47)  POCT Blood Glucose.: 229 mg/dL (09-20-21 @ 07:53)  POCT Blood Glucose.: 188 mg/dL (09-19-21 @ 20:54)  POCT Blood Glucose.: 109 mg/dL (09-19-21 @ 17:40)  POCT Blood Glucose.: 143 mg/dL (09-19-21 @ 11:49)  POCT Blood Glucose.: 130 mg/dL (09-19-21 @ 07:05)  POCT Blood Glucose.: 161 mg/dL (09-18-21 @ 21:16)  POCT Blood Glucose.: 106 mg/dL (09-18-21 @ 15:55)  POCT Blood Glucose.: 71 mg/dL (09-18-21 @ 10:55)

## 2021-09-21 NOTE — DISCHARGE NOTE PROVIDER - NSDCFUADDINST_GEN_ALL_CORE_FT
***PLEASE BRING THESE DISCHARGE PAPERS WITH YOU TO ALL DOCTOR APPOINTMENTS AND SHOW THEM TO ALL YOUR HEALTH CARE PROVIDERS AND CAREGIVERS***    ***AMBULATE WITH THE ROLLING WALKER AND SUPERVISION FOR YOUR SAFETY***

## 2021-09-21 NOTE — DISCHARGE NOTE NURSING/CASE MANAGEMENT/SOCIAL WORK - PATIENT PORTAL LINK FT
You can access the FollowMyHealth Patient Portal offered by [facility] by registering at the following website: [email or web address]. By joining [facility]’s FollowMyHealth portal, you will also be able to view your health information using other applications (apps) compatible with our system.

## 2021-09-21 NOTE — DISCHARGE NOTE PROVIDER - NSDCCPCAREPLAN_GEN_ALL_CORE_FT
PRINCIPAL DISCHARGE DIAGNOSIS  Diagnosis: Acute CVA (cerebrovascular accident)  Assessment and Plan of Treatment: You were admitted to the hospital with seizures due to an acute stroke in the back of your brain on the left side. You are being treated with diet and medication, and you were followed by the Neurology, Medicine and Rehab medicine teams. You also received therapy every day, and therapy will continue at home as well. Please continue to take your medications and follow a heart healthy diet as prescribed. You will need to follow up often with your primary care provider (PCP) Dr. Shelton, and with your Neurologist at the [facility]. A loop recorder was also implanted in your chest on 9/8/21 in order to monitor your heart for irregular beats that could cause a stroke. You will also need to follow up with the Electrophysiologist (Cardiologist) in about 4 weeks.      SECONDARY DISCHARGE DIAGNOSES  Diagnosis: History of status epilepticus  Assessment and Plan of Treatment: You had multiple seizures when you were admitted, and you are now taking two seizure medications to prevent further seizures. It is also important to keep your magnesium at a normal level, so you are taking magnesium supplements with meals and you can also increase your level by eating foods that contain high levels of magnesium (see diet below). Please follow up with your Neurologist at the Stroke Clinic in 2 to 4 weeks. Dr. Neil's office will contact you (via your daughter Pamela) in order to make an appointment for you.    Diagnosis: Hypertension  Assessment and Plan of Treatment: Your blood pressure is well-controlled on your current medications. Please continue them as precribed, as well as a heart healthy diet (low salt, low fat, low cholesterol). Follow up often with Dr. Shelton    Diagnosis: Type 2 diabetes mellitus  Assessment and Plan of Treatment: Your blood sugars are well - controlled on current diabetic diet and medications. Please check your blood sugar before each meal and keep a record to show your doctors, so they can better adjust your medications if needed. Also see your eye doctor (Ophthalmologist) and your foot doctor (Podiatrist) regularly.    Diagnosis: History of urinary retention  Assessment and Plan of Treatment: When you were hospitalized, you needed a lubin catheter because you were retaining urine in your bladder and could not pass it. You are taking medication called Cardura (doxazosin) which helps you to empty your bladder and also controls your blood pressure. Please continue to take this medication. You are now walking better as well, so the catheter was able to be removed and you are now voiding well. Call your doctor right away, or go to the hospital, if you are unable to pass urine, or if you have difficulty or pain when urinating, or you see blood in your urine.

## 2021-09-21 NOTE — DISCHARGE NOTE PROVIDER - NSDCMRMEDTOKEN_GEN_ALL_CORE_FT
acetaminophen 325 mg oral tablet: 2 tab(s) orally every 6 hours, As needed, for pain or fever  albuterol 90 mcg/inh inhalation aerosol: 2 puff(s) inhaled every 6 hours  aspirin 81 mg oral tablet, chewable: 1 tab(s) orally once a day; take with food  atorvastatin 40 mg oral tablet: 1 tab(s) orally once a day (at bedtime)  clopidogrel 75 mg oral tablet: 1 tab(s) orally once a day  doxazosin 4 mg oral tablet: 1 tab(s) orally once a day (at bedtime)  glucometer (per patient&#x27;s insurance): Test blood sugars 3 times a day, before each meal. Dispense #1 glucometer.  Janumet 50 mg-1000 mg oral tablet: 1 tab(s) orally 2 times a day with meals (with breakfast and with dinner)  lacosamide 100 mg oral tablet: 1 tab(s) orally every 12 hours MDD:200 mg  lancets: 1 application subcutaneously 3 times a day   Lantus Solostar Pen 100 units/mL subcutaneous solution: 30 unit(s) subcutaneous once a day (at bedtime)   Lantus Solostar Pen 100 units/mL subcutaneous solution: 30 unit(s) subcutaneous once a day (at bedtime); use with lantus Solostar pen as instructed   levETIRAcetam 1000 mg oral tablet: 1 tab(s) orally every 12 hours   lisinopril 5 mg oral tablet: 1 tab(s) orally once a day in the morning  magnesium oxide 400 mg oral tablet: 1 tab(s) orally 3 times a day (with meals)  melatonin 3 mg oral tablet: 1 tab(s) orally once a day (at bedtime), As needed, Insomnia  Multiple Vitamins oral tablet: 1 tab(s) orally once a day  OLANZapine 5 mg oral tablet: 1 tab(s) orally once a day (at bedtime)  omeprazole 20 mg oral delayed release capsule: 1 cap(s) orally once a day in the morning, take 30 minutes before breakfast  senna oral tablet: 2 tab(s) orally once a day (at bedtime), As Needed  test strips (per patient&#x27;s insurance): 1 application subcutaneously 4 times a day. ** Compatible with patient&#x27;s glucometer **  tiotropium 18 mcg inhalation capsule: 1 cap(s) inhaled once a day  Trulicity Pen 0.75 mg/0.5 mL subcutaneous solution: 0.5 milliliter(s) subcutaneous once a week, every Tuesday

## 2021-09-21 NOTE — PROGRESS NOTE ADULT - ATTENDING COMMENTS
Patient seen, examined and discussed with the resident. He is progressing nicely. I have directed the care. He is stable for discharge home.  Rehab of Acute Left Occipital Infarcts, s/p  Metabolic Encephalopathy s/p Status Epilepticus, with gait disorder and mental status changes. Good acute rehab candidate. Requires acute inpatient rehab and hospital setting to monitor blood sugars, monitor for further seizure activity and monitor mental status with Neurology f/u as needed.  - Patient has completed in pt rehab   - c/w aspirin   - c/w plavix     S/P Status Epilepticus  - continue Lacosamide 100 mg bid and levetiracetam 1000 mg po bid   - Monitor  - no clinical sign of seizure. No recent facial twitching noted    DM 2, s/p DKA, HHS  - FS glucose better today, 100s, on glargine 25 units and lispro 7 units TID AC   - sliding scale prn   - lispro 7 u with meals    -glargine 30 qHs    s/p Pneumonia, s/p Respiratory Failure  - likely aspiration  - s/p antibiotics    Urinary Retention  - lubin out 9/14/2021   - now urinating independently   - on cardura 4mg po nightly       HTN  - monitor on current meds  - controlled    Diabetic Neuropathy    History of Osteomyelitis, likely Diabetic Ulcers  - s/p remote amputations right toes, and recently left 1st and 2nd toes. Stable    History of Bipolar Depression  - Neuropsych Eval  - c/w olanzapine 5 mg po qhs         Anemia  - chronic disease.   - iron 54   - TIBC 145  - Fe sat 37%  - monitor    HLD  - Atorvastatin    Maintenance   GI - pantoprazole  BM - senna 2 tabs po qhs, magnesium hydroxide 30 ml po qd prn for constipation   sleep- melatonin 3 mg po qhs prn for insomnia   -FEN - monitor lytes, intake  - Diet: soft, DASH, carb controlled    Precautions / PROPHYLAXIS:    - Falls  - Ortho: Weight bearing status: WBAT  - DVT prophylaxis:  Lovenox

## 2021-09-21 NOTE — DISCHARGE NOTE PROVIDER - PROVIDER TOKENS
PROVIDER:[TOKEN:[[id number]],FOLLOWUP:[2 weeks],ESTABLISHEDPATIENT:[T]],PROVIDER:[TOKEN:[[id number]],FOLLOWUP:[2 weeks]],PROVIDER:[TOKEN:[[id number]],SCHEDULEDAPPT:[10/11/2021]]

## 2021-09-22 ENCOUNTER — APPOINTMENT (OUTPATIENT)
Dept: CARE COORDINATION | Facility: HOME HEALTH | Age: 71
End: 2021-09-22
Payer: MEDICARE

## 2021-09-22 DIAGNOSIS — G93.49 OTHER ENCEPHALOPATHY: ICD-10-CM

## 2021-09-22 DIAGNOSIS — Z79.82 LONG TERM (CURRENT) USE OF ASPIRIN: ICD-10-CM

## 2021-09-22 DIAGNOSIS — Z89.422 ACQUIRED ABSENCE OF OTHER LEFT TOE(S): ICD-10-CM

## 2021-09-22 DIAGNOSIS — F31.9 BIPOLAR DISORDER, UNSPECIFIED: ICD-10-CM

## 2021-09-22 DIAGNOSIS — J96.01 ACUTE RESPIRATORY FAILURE WITH HYPOXIA: ICD-10-CM

## 2021-09-22 DIAGNOSIS — Z80.1 FAMILY HISTORY OF MALIGNANT NEOPLASM OF TRACHEA, BRONCHUS AND LUNG: ICD-10-CM

## 2021-09-22 DIAGNOSIS — E11.51 TYPE 2 DIABETES MELLITUS WITH DIABETIC PERIPHERAL ANGIOPATHY WITHOUT GANGRENE: ICD-10-CM

## 2021-09-22 DIAGNOSIS — R47.01 APHASIA: ICD-10-CM

## 2021-09-22 DIAGNOSIS — F32.9 MAJOR DEPRESSIVE DISORDER, SINGLE EPISODE, UNSPECIFIED: ICD-10-CM

## 2021-09-22 DIAGNOSIS — Z82.49 FAMILY HISTORY OF ISCHEMIC HEART DISEASE AND OTHER DISEASES OF THE CIRCULATORY SYSTEM: ICD-10-CM

## 2021-09-22 DIAGNOSIS — K21.9 GASTRO-ESOPHAGEAL REFLUX DISEASE WITHOUT ESOPHAGITIS: ICD-10-CM

## 2021-09-22 DIAGNOSIS — G40.401 OTHER GENERALIZED EPILEPSY AND EPILEPTIC SYNDROMES, NOT INTRACTABLE, WITH STATUS EPILEPTICUS: ICD-10-CM

## 2021-09-22 DIAGNOSIS — I45.89 OTHER SPECIFIED CONDUCTION DISORDERS: ICD-10-CM

## 2021-09-22 DIAGNOSIS — R45.1 RESTLESSNESS AND AGITATION: ICD-10-CM

## 2021-09-22 DIAGNOSIS — I63.49 CEREBRAL INFARCTION DUE TO EMBOLISM OF OTHER CEREBRAL ARTERY: ICD-10-CM

## 2021-09-22 DIAGNOSIS — I95.9 HYPOTENSION, UNSPECIFIED: ICD-10-CM

## 2021-09-22 DIAGNOSIS — Z79.899 OTHER LONG TERM (CURRENT) DRUG THERAPY: ICD-10-CM

## 2021-09-22 DIAGNOSIS — F05 DELIRIUM DUE TO KNOWN PHYSIOLOGICAL CONDITION: ICD-10-CM

## 2021-09-22 DIAGNOSIS — I10 ESSENTIAL (PRIMARY) HYPERTENSION: ICD-10-CM

## 2021-09-22 DIAGNOSIS — J18.9 PNEUMONIA, UNSPECIFIED ORGANISM: ICD-10-CM

## 2021-09-22 DIAGNOSIS — Z79.4 LONG TERM (CURRENT) USE OF INSULIN: ICD-10-CM

## 2021-09-22 DIAGNOSIS — E78.5 HYPERLIPIDEMIA, UNSPECIFIED: ICD-10-CM

## 2021-09-22 DIAGNOSIS — R47.1 DYSARTHRIA AND ANARTHRIA: ICD-10-CM

## 2021-09-22 DIAGNOSIS — N17.9 ACUTE KIDNEY FAILURE, UNSPECIFIED: ICD-10-CM

## 2021-09-22 DIAGNOSIS — E87.5 HYPERKALEMIA: ICD-10-CM

## 2021-09-22 DIAGNOSIS — Z89.421 ACQUIRED ABSENCE OF OTHER RIGHT TOE(S): ICD-10-CM

## 2021-09-22 DIAGNOSIS — E11.00 TYPE 2 DIABETES MELLITUS WITH HYPEROSMOLARITY WITHOUT NONKETOTIC HYPERGLYCEMIC-HYPEROSMOLAR COMA (NKHHC): ICD-10-CM

## 2021-09-22 DIAGNOSIS — R57.8 OTHER SHOCK: ICD-10-CM

## 2021-09-22 DIAGNOSIS — R29.709 NIHSS SCORE 9: ICD-10-CM

## 2021-09-22 DIAGNOSIS — R41.82 ALTERED MENTAL STATUS, UNSPECIFIED: ICD-10-CM

## 2021-09-22 PROCEDURE — 99348 HOME/RES VST EST LOW MDM 30: CPT | Mod: 95

## 2021-09-22 RX ORDER — AMOXICILLIN AND CLAVULANATE POTASSIUM 875; 125 MG/1; MG/1
875-125 TABLET, COATED ORAL
Qty: 28 | Refills: 0 | Status: DISCONTINUED | COMMUNITY
Start: 2018-05-17 | End: 2021-09-22

## 2021-09-22 RX ORDER — FLUCONAZOLE 150 MG/1
150 TABLET ORAL DAILY
Qty: 14 | Refills: 0 | Status: DISCONTINUED | COMMUNITY
Start: 2020-05-28 | End: 2021-09-22

## 2021-09-22 RX ORDER — LANCETS
EACH MISCELLANEOUS
Qty: 100 | Refills: 1 | Status: DISCONTINUED | COMMUNITY
Start: 2020-05-20 | End: 2021-09-22

## 2021-09-22 RX ORDER — EMPAGLIFLOZIN 10 MG/1
10 TABLET, FILM COATED ORAL
Qty: 90 | Refills: 3 | Status: DISCONTINUED | COMMUNITY
Start: 2020-06-18 | End: 2021-09-22

## 2021-09-22 RX ORDER — INSULIN ASPART 100 [IU]/ML
(70-30) 100 INJECTION, SUSPENSION SUBCUTANEOUS
Qty: 90 | Refills: 0 | Status: DISCONTINUED | COMMUNITY
Start: 2017-04-01 | End: 2021-09-22

## 2021-09-22 RX ORDER — VITAMINS A AND D OINTMENT 15.5; 53.4 G/100G; G/100G
OINTMENT TOPICAL DAILY
Qty: 1 | Refills: 0 | Status: DISCONTINUED | COMMUNITY
Start: 2020-08-20 | End: 2021-09-22

## 2021-09-22 RX ORDER — COLLAGENASE SANTYL 250 [ARB'U]/G
250 OINTMENT TOPICAL DAILY
Qty: 1 | Refills: 2 | Status: DISCONTINUED | COMMUNITY
Start: 2020-05-18 | End: 2021-09-22

## 2021-09-22 RX ORDER — SILVER SULFADIAZINE 10 G/1000G
1 CREAM TOPICAL TWICE DAILY
Qty: 1 | Refills: 3 | Status: DISCONTINUED | COMMUNITY
Start: 2018-05-24 | End: 2021-09-22

## 2021-09-22 RX ORDER — NYSTATIN 100000 [USP'U]/G
100000 CREAM TOPICAL 3 TIMES DAILY
Qty: 3 | Refills: 10 | Status: DISCONTINUED | COMMUNITY
Start: 2017-10-26 | End: 2021-09-22

## 2021-09-22 RX ORDER — AMOXICILLIN AND CLAVULANATE POTASSIUM 875; 125 MG/1; MG/1
875-125 TABLET, COATED ORAL
Qty: 14 | Refills: 0 | Status: DISCONTINUED | COMMUNITY
Start: 2019-09-12 | End: 2021-09-22

## 2021-09-22 RX ORDER — PIOGLITAZONE HYDROCHLORIDE 30 MG/1
30 TABLET ORAL DAILY
Qty: 90 | Refills: 0 | Status: DISCONTINUED | COMMUNITY
Start: 2019-01-17 | End: 2021-09-22

## 2021-09-22 RX ORDER — BENAZEPRIL HYDROCHLORIDE 40 MG/1
40 TABLET, FILM COATED ORAL
Qty: 90 | Refills: 0 | Status: DISCONTINUED | COMMUNITY
Start: 2017-04-01 | End: 2021-09-22

## 2021-09-22 RX ORDER — COLLAGENASE SANTYL 250 [ARB'U]/G
250 OINTMENT TOPICAL DAILY
Qty: 1 | Refills: 4 | Status: DISCONTINUED | COMMUNITY
Start: 2019-01-25 | End: 2021-09-22

## 2021-09-22 RX ORDER — NYSTATIN 100000 [USP'U]/G
100000 CREAM TOPICAL 3 TIMES DAILY
Qty: 3 | Refills: 10 | Status: DISCONTINUED | COMMUNITY
Start: 2017-07-03 | End: 2021-09-22

## 2021-09-22 RX ORDER — FAMOTIDINE 20 MG/1
20 TABLET, FILM COATED ORAL TWICE DAILY
Qty: 60 | Refills: 5 | Status: DISCONTINUED | COMMUNITY
Start: 2017-12-29 | End: 2021-09-22

## 2021-09-22 RX ORDER — CEFADROXIL 500 MG/1
500 CAPSULE ORAL TWICE DAILY
Qty: 14 | Refills: 0 | Status: DISCONTINUED | COMMUNITY
Start: 2020-05-18 | End: 2021-09-22

## 2021-09-22 RX ORDER — INSULIN DEGLUDEC INJECTION 200 U/ML
200 INJECTION, SOLUTION SUBCUTANEOUS DAILY
Qty: 3 | Refills: 3 | Status: DISCONTINUED | COMMUNITY
Start: 2020-06-18 | End: 2021-09-22

## 2021-09-22 RX ORDER — AMOXICILLIN AND CLAVULANATE POTASSIUM 875; 125 MG/1; MG/1
875-125 TABLET, COATED ORAL
Qty: 14 | Refills: 0 | Status: DISCONTINUED | COMMUNITY
Start: 2018-05-24 | End: 2021-09-22

## 2021-09-22 RX ORDER — AMMONIUM LACTATE 12 %
12 LOTION (GRAM) TOPICAL
Qty: 1 | Refills: 6 | Status: DISCONTINUED | COMMUNITY
Start: 2017-10-26 | End: 2021-09-22

## 2021-09-22 RX ORDER — ROSUVASTATIN CALCIUM 10 MG/1
10 TABLET, FILM COATED ORAL
Qty: 90 | Refills: 0 | Status: DISCONTINUED | COMMUNITY
Start: 2017-04-01 | End: 2021-09-22

## 2021-09-22 RX ORDER — EMPAGLIFLOZIN AND METFORMIN HYDROCHLORIDE 12.5; 1 MG/1; MG/1
12.5-1 TABLET ORAL
Qty: 180 | Refills: 3 | Status: DISCONTINUED | COMMUNITY
Start: 2020-10-15 | End: 2021-09-22

## 2021-09-22 RX ORDER — COLLAGENASE SANTYL 250 [ARB'U]/G
250 OINTMENT TOPICAL DAILY
Qty: 1 | Refills: 3 | Status: DISCONTINUED | COMMUNITY
Start: 2019-05-23 | End: 2021-09-22

## 2021-09-22 RX ORDER — CIPROFLOXACIN HYDROCHLORIDE 750 MG/1
750 TABLET, FILM COATED ORAL
Qty: 7 | Refills: 0 | Status: DISCONTINUED | COMMUNITY
Start: 2019-09-12 | End: 2021-09-22

## 2021-09-22 RX ORDER — SERTRALINE HYDROCHLORIDE 50 MG/1
50 TABLET, FILM COATED ORAL
Qty: 90 | Refills: 0 | Status: DISCONTINUED | COMMUNITY
Start: 2017-04-01 | End: 2021-09-22

## 2021-09-22 NOTE — PLAN
[FreeTextEntry1] : CVA- c/w ASA, STATIN , plavix heart healthy diet\par HTN- c/w home meds\par DM- BS monitoring , low carb diet \par EP/ PCP/Neuro follow up advised

## 2021-09-22 NOTE — HISTORY OF PRESENT ILLNESS
[Home] : at home, [unfilled] , at the time of the visit. [Other Location: e.g. Home (Enter Location, City,State)___] : at [unfilled] [Verbal consent obtained from patient] : the patient, [unfilled] [FreeTextEntry1] : follow up cva [de-identified] : Patient is 72 y/o M  enrolled in the [employer] CV[facility] with PMH of DM 2, osteomyelitis, GERD, bipolar depression, HLD, and HTN, presented to the ED on 8/24/21 for altered mental status admitted for CVA and dc to [facility] rehab , patient d/c to home with daughter in [place] . Patient observed via tele health alert in NAD denies c/p, SOB, dizziness , headache and confusion.

## 2021-09-22 NOTE — COUNSELING
[de-identified] :  Continue to optimize  medical treatment for stroke prevention, including all prescribed medication regimen, provider follow up, and continue to maximize behavioral modifications, such as heart healthy, low fat diet rich in fruits and vegetables. TCM reviewed and all yellow card contact provided. Patient encouraged to reach out to NP with any complaints or concerns, will continue to monitor. \par \par

## 2021-10-04 DIAGNOSIS — Z86.73 PERSONAL HISTORY OF TRANSIENT ISCHEMIC ATTACK (TIA), AND CEREBRAL INFARCTION WITHOUT RESIDUAL DEFICITS: ICD-10-CM

## 2021-10-04 DIAGNOSIS — J44.9 CHRONIC OBSTRUCTIVE PULMONARY DISEASE, UNSPECIFIED: ICD-10-CM

## 2021-10-04 DIAGNOSIS — G93.41 METABOLIC ENCEPHALOPATHY: ICD-10-CM

## 2021-10-04 DIAGNOSIS — G40.909 EPILEPSY, UNSPECIFIED, NOT INTRACTABLE, WITHOUT STATUS EPILEPTICUS: ICD-10-CM

## 2021-10-04 DIAGNOSIS — R33.9 RETENTION OF URINE, UNSPECIFIED: ICD-10-CM

## 2021-10-04 DIAGNOSIS — D63.8 ANEMIA IN OTHER CHRONIC DISEASES CLASSIFIED ELSEWHERE: ICD-10-CM

## 2021-10-04 DIAGNOSIS — Z91.14 PATIENT'S OTHER NONCOMPLIANCE WITH MEDICATION REGIMEN: ICD-10-CM

## 2021-10-04 DIAGNOSIS — I69.398 OTHER SEQUELAE OF CEREBRAL INFARCTION: ICD-10-CM

## 2021-10-04 DIAGNOSIS — E11.40 TYPE 2 DIABETES MELLITUS WITH DIABETIC NEUROPATHY, UNSPECIFIED: ICD-10-CM

## 2021-10-04 DIAGNOSIS — K21.9 GASTRO-ESOPHAGEAL REFLUX DISEASE WITHOUT ESOPHAGITIS: ICD-10-CM

## 2021-10-04 DIAGNOSIS — R41.82 ALTERED MENTAL STATUS, UNSPECIFIED: ICD-10-CM

## 2021-10-04 DIAGNOSIS — I10 ESSENTIAL (PRIMARY) HYPERTENSION: ICD-10-CM

## 2021-10-04 DIAGNOSIS — F31.9 BIPOLAR DISORDER, UNSPECIFIED: ICD-10-CM

## 2021-10-04 DIAGNOSIS — R26.9 UNSPECIFIED ABNORMALITIES OF GAIT AND MOBILITY: ICD-10-CM

## 2021-10-04 DIAGNOSIS — I25.10 ATHEROSCLEROTIC HEART DISEASE OF NATIVE CORONARY ARTERY WITHOUT ANGINA PECTORIS: ICD-10-CM

## 2021-10-04 DIAGNOSIS — H70.90 UNSPECIFIED MASTOIDITIS, UNSPECIFIED EAR: ICD-10-CM

## 2021-10-04 DIAGNOSIS — Z89.422 ACQUIRED ABSENCE OF OTHER LEFT TOE(S): ICD-10-CM

## 2021-10-04 DIAGNOSIS — E78.5 HYPERLIPIDEMIA, UNSPECIFIED: ICD-10-CM

## 2021-10-04 DIAGNOSIS — Z95.5 PRESENCE OF CORONARY ANGIOPLASTY IMPLANT AND GRAFT: ICD-10-CM

## 2021-10-05 ENCOUNTER — OUTPATIENT (OUTPATIENT)
Dept: OUTPATIENT SERVICES | Facility: HOSPITAL | Age: 71
LOS: 1 days | Discharge: HOME | End: 2021-10-05

## 2021-10-05 ENCOUNTER — APPOINTMENT (OUTPATIENT)
Dept: PODIATRY | Facility: CLINIC | Age: 71
End: 2021-10-05
Payer: MEDICARE

## 2021-10-05 DIAGNOSIS — Z89.429 ACQUIRED ABSENCE OF OTHER TOE(S), UNSPECIFIED SIDE: Chronic | ICD-10-CM

## 2021-10-05 DIAGNOSIS — Z89.421 ACQUIRED ABSENCE OF OTHER RIGHT TOE(S): Chronic | ICD-10-CM

## 2021-10-05 PROCEDURE — 99213 OFFICE O/P EST LOW 20 MIN: CPT

## 2021-10-05 NOTE — ASSESSMENT
[Verbal] : verbal [Patient] : patient [FreeTextEntry1] : Assessment:\par -Diabetes mellitus with  neuropathy\par -s/p TMA, R foot\par -s/p hallux partial amputation, L foot\par -s/p 2nd digit amputation, L foot\par \par Plan:\par -Pt educated on proper diabetic foot care and blood sugar control\par -Pt advised to check feet daily\par -Elongated, hypertrophic nails debrided to normotrophic length and height\par -Ammonium lactate cream to b/l LE\par -Advised pt to not wear two surgical shoes concurrently\par -RTC 3 months for DFC

## 2021-10-05 NOTE — PHYSICAL EXAM
[General Appearance - Alert] : alert [General Appearance - In No Acute Distress] : in no acute distress [General Appearance - Well Nourished] : well nourished [General Appearance - Well Developed] : well developed [Skin Lesions] : no skin lesions [Ankle Swelling Bilaterally] : bilaterally  [1+] : left foot dorsalis pedis 1+ [Diminished Throughout Right Foot] : diminished sensation with monofilament testing throughout right foot [Diminished Throughout Left Foot] : diminished sensation with monofilament testing throughout left foot [Oriented To Time, Place, And Person] : oriented to person, place, and time [Impaired Insight] : insight and judgment were intact [Affect] : the affect was normal [Ankle Swelling (On Exam)] : not present [Varicose Veins Of Lower Extremities] : not present [] : not present [Delayed in the Right Toes] : capillary refills normal in right toes [Delayed in the Left Toes] : capillary refills normal in the left toes [de-identified] : 5/5 MMT to ankle testing b/l. No pain with passive ankle ROM testing b/l. \par TMA, R foot\par S/p partial hallux amputation, L foot\par S/p 2nd digit amputation, L foot [FreeTextEntry1] : Skin to b/l LE dry.\par Nails thick & dystrophic x 2\par Small area of blanchable erythema to lateral R TMA site [Vibration Dec.] : normal vibratory sensation at the level of the toes

## 2021-10-05 NOTE — END OF VISIT
[] : Resident [FreeTextEntry3] : h/o of b/l amputations (TMA and digital amps)\par foot exam unremarkable. no ulcerations. no opens wound\par reinforced diabetic foot precautions. monitor bottom of feet daily\par return 3 month

## 2021-10-05 NOTE — PHYSICAL EXAM
[General Appearance - Alert] : alert [General Appearance - In No Acute Distress] : in no acute distress [General Appearance - Well Nourished] : well nourished [General Appearance - Well Developed] : well developed [Skin Lesions] : no skin lesions [Ankle Swelling Bilaterally] : bilaterally  [1+] : left foot dorsalis pedis 1+ [Diminished Throughout Right Foot] : diminished sensation with monofilament testing throughout right foot [Diminished Throughout Left Foot] : diminished sensation with monofilament testing throughout left foot [Oriented To Time, Place, And Person] : oriented to person, place, and time [Impaired Insight] : insight and judgment were intact [Affect] : the affect was normal [Ankle Swelling (On Exam)] : not present [Varicose Veins Of Lower Extremities] : not present [] : not present [Delayed in the Right Toes] : capillary refills normal in right toes [Delayed in the Left Toes] : capillary refills normal in the left toes [de-identified] : 5/5 MMT to ankle testing b/l. No pain with passive ankle ROM testing b/l. \par TMA, R foot\par S/p partial hallux amputation, L foot\par S/p 2nd digit amputation, L foot [FreeTextEntry1] : Skin to b/l LE dry.\par Nails thick & dystrophic x 2\par Small area of blanchable erythema to lateral R TMA site [Vibration Dec.] : normal vibratory sensation at the level of the toes

## 2021-10-05 NOTE — HISTORY OF PRESENT ILLNESS
[Post-op Sandals] : post-op sandals [FreeTextEntry1] : 71 year old M  presents for a diabetic foot evaluation. Mr. PORTER denies any tingling burning in his feet. Pt reports to clinic ambulating in two surgical shoes. Pt states he no longer has his inserts; is interested in more inserts. Pt states the inserts helped. Pt reports recent TIA that required hospitalization last month. Pt states he is feeling better, states strength and coordination is still recovering. Pt is seeing physical therapy for rehab. Most recent , FBS usually ~100s. No other pedal complaints. \par

## 2021-10-06 DIAGNOSIS — Z89.431 ACQUIRED ABSENCE OF RIGHT FOOT: ICD-10-CM

## 2021-10-06 DIAGNOSIS — Z89.419 ACQUIRED ABSENCE OF UNSPECIFIED GREAT TOE: ICD-10-CM

## 2021-10-06 DIAGNOSIS — E11.40 TYPE 2 DIABETES MELLITUS WITH DIABETIC NEUROPATHY, UNSPECIFIED: ICD-10-CM

## 2021-10-08 NOTE — PROGRESS NOTE ADULT - ASSESSMENT
Called patient informed of   Normal cholesterol   Normal sugar/ diabetes screening test   Normal thyroid test   Potassium has improved. Normal sodium liver test. Kidney function stage 5; anemia has improved hgb up to 11.9. mcv is high - Please continue to follow up with dialysis / nephrologist regularly (pt goes m/w/f) and still needs gi follow up as discussed   Prostate blood test normal   Hep c negative   Patient verbalized understanding.  Stated he will call GI and schedule appt soon.   Pain: Denied     Location:  N/A                          Ratin/10     Intervention:  N/A  Orientation: Self /Place/ Event /Month/Year/Day/ Date/ Time  Arousal Level: Alert  Behavior: Pleasant and cooperative  Affect Range:  WFL     Needed: ? Yes  ? No   #: N/A  Attention: ? WFL ? Impaired  Insight into illness/deficits: Good     Treatment Session Focused on Cognition/ Family Contact     Patient was seen with his grandson to discuss discharge and outpatient services. There were minimal concerns about living with his daughter or transitioning out of acute care to outpatient services. He is motivated to continue progressing on his recovery. They were given information about outpatient neuropsychological services. Any questions were answered. He will be discharged later today.    Goals: ? Discharge  Plan: 1-2 times a week 30-60 minutes   Brain Injury Protocol:  ? No      Cognitive Behavioral Guidelines: ? No

## 2021-10-11 ENCOUNTER — APPOINTMENT (OUTPATIENT)
Dept: CARDIOLOGY | Facility: CLINIC | Age: 71
End: 2021-10-11
Payer: MEDICARE

## 2021-10-11 VITALS
OXYGEN SATURATION: 95 % | HEIGHT: 71 IN | BODY MASS INDEX: 28.84 KG/M2 | TEMPERATURE: 97.9 F | SYSTOLIC BLOOD PRESSURE: 118 MMHG | DIASTOLIC BLOOD PRESSURE: 66 MMHG | WEIGHT: 206 LBS | HEART RATE: 99 BPM | RESPIRATION RATE: 16 BRPM

## 2021-10-11 DIAGNOSIS — Z51.89 ENCOUNTER FOR OTHER SPECIFIED AFTERCARE: ICD-10-CM

## 2021-10-11 DIAGNOSIS — Z95.818 PRESENCE OF OTHER CARDIAC IMPLANTS AND GRAFTS: ICD-10-CM

## 2021-10-11 PROCEDURE — 99212 OFFICE O/P EST SF 10 MIN: CPT

## 2021-10-11 PROCEDURE — 93000 ELECTROCARDIOGRAM COMPLETE: CPT

## 2021-10-11 RX ORDER — METFORMIN HYDROCHLORIDE 1000 MG/1
1000 TABLET, COATED ORAL
Qty: 180 | Refills: 0 | Status: DISCONTINUED | COMMUNITY
Start: 2021-06-08 | End: 2021-10-11

## 2021-10-11 NOTE — PROCEDURE
[See Device Printout] : See device printout [de-identified] : GLORIA [de-identified] : LNQ11 [de-identified] : [id number] [de-identified] : 9/8/2021 [de-identified] : Good [de-identified] : No events

## 2021-10-11 NOTE — HISTORY OF PRESENT ILLNESS
[Palpitations] : no palpitations [SOB] : no dyspnea [Syncope] : no syncope [Dizziness] : no dizziness [Chest Pain] : no chest pain or discomfort [Pain at Site] : no pain at device site [Erythema at Site] : no erythema at device site [Swelling at Site] : no swelling at device site [de-identified] : PCP: Dr. Shelton\par Cardiologist: None\par \par The patient underwent placement of ILR on 9/8/2021 for cryptogenic CVA. He has no complaints and is doing well since the hospitalization. \par \par Cardiac testing:\par ECG (10/11/2021): Sinus rhythm at 86 bpm\par 2D ECHO (9/7/2021): EF normal, mildly enlarged LA, normal RA

## 2021-10-11 NOTE — PHYSICAL EXAM
[General Appearance - Well Developed] : well developed [Normal Appearance] : normal appearance [Well Groomed] : well groomed [General Appearance - Well Nourished] : well nourished [No Deformities] : no deformities [Heart Rate And Rhythm] : heart rate and rhythm were normal [General Appearance - In No Acute Distress] : no acute distress [Heart Sounds] : normal S1 and S2 [Murmurs] : no murmurs present [] : no respiratory distress [Respiration, Rhythm And Depth] : normal respiratory rhythm and effort [Exaggerated Use Of Accessory Muscles For Inspiration] : no accessory muscle use [Auscultation Breath Sounds / Voice Sounds] : lungs were clear to auscultation bilaterally [Clean] : clean [Dry] : dry [Well-Healed] : well-healed [FreeTextEntry1] : Parasternal [Abdomen Soft] : soft [Nail Clubbing] : no clubbing of the fingernails [Cyanosis, Localized] : no localized cyanosis

## 2021-10-11 NOTE — DISCUSSION/SUMMARY
[FreeTextEntry1] : Mr. Tarqi Ceja is a pleasant 71-year-old man with hypertension, hyperlipidemia, diabetes mellitus, depression, GERD, admitted to [facility] in September 2021 for cryptogenic stroke. I recommend an ILR implant for this patient for long term detection of arrhythmias as a cause of his symptoms. Patient underwent implant of ILR on 9/8/2021.\par \par His wound is healed well. There are no signs and symptoms of inflammation. I interrogated his device as described above. Patient is enrolled in remote monitoring services. I reviewed remote transmission process with the patient, as well as schedule and ability to do manual transmission. We also spoke about associated co-payments with remote monitoring that may not be covered by insurance. \par \par Hospital records reviewed. I recommended that patient establish care with a cardiologist as he has not seen one in the past. Referral provided. His blood pressure is well controlled.\par \par The patient will return to our office in 6 months. If you have any questions please contact our office at [phone].

## 2021-10-11 NOTE — HISTORY OF PRESENT ILLNESS
[Palpitations] : no palpitations [SOB] : no dyspnea [Syncope] : no syncope [Dizziness] : no dizziness [Chest Pain] : no chest pain or discomfort [Pain at Site] : no pain at device site [Erythema at Site] : no erythema at device site [Swelling at Site] : no swelling at device site [de-identified] : PCP: Dr. Shelton\par Cardiologist: None\par \par The patient underwent placement of ILR on 9/8/2021 for cryptogenic CVA. He has no complaints and is doing well since the hospitalization. \par \par Cardiac testing:\par ECG (10/11/2021): Sinus rhythm at 86 bpm\par 2D ECHO (9/7/2021): EF normal, mildly enlarged LA, normal RA

## 2021-10-11 NOTE — DISCUSSION/SUMMARY
[FreeTextEntry1] : Mr. Tariq Ceja is a pleasant 71-year-old man with hypertension, hyperlipidemia, diabetes mellitus, depression, GERD, admitted to [facility] in September 2021 for cryptogenic stroke. I recommend an ILR implant for this patient for long term detection of arrhythmias as a cause of his symptoms. Patient underwent implant of ILR on 9/8/2021.\par \par His wound is healed well. There are no signs and symptoms of inflammation. I interrogated his device as described above. Patient is enrolled in remote monitoring services. I reviewed remote transmission process with the patient, as well as schedule and ability to do manual transmission. We also spoke about associated co-payments with remote monitoring that may not be covered by insurance. \par \par Hospital records reviewed. I recommended that patient establish care with a cardiologist as he has not seen one in the past. Referral provided. His blood pressure is well controlled.\par \par The patient will return to our office in 6 months. If you have any questions please contact our office at [phone].

## 2021-10-11 NOTE — REASON FOR VISIT
[New Patient Device Check] : is here today for a new patient device check visit for [Other ___] : [unfilled] [Family Member] : family member [Other: _____] : [unfilled]

## 2021-10-11 NOTE — PROCEDURE
[See Device Printout] : See device printout [de-identified] : GLORIA [de-identified] : LNQ11 [de-identified] : [id number] [de-identified] : 9/8/2021 [de-identified] : Good [de-identified] : No events

## 2021-10-13 ENCOUNTER — NON-APPOINTMENT (OUTPATIENT)
Age: 71
End: 2021-10-13

## 2021-10-13 ENCOUNTER — APPOINTMENT (OUTPATIENT)
Dept: CARDIOLOGY | Facility: CLINIC | Age: 71
End: 2021-10-13
Payer: MEDICARE

## 2021-10-13 PROCEDURE — 93298 REM INTERROG DEV EVAL SCRMS: CPT

## 2021-10-13 PROCEDURE — G2066: CPT | Mod: NC

## 2021-10-14 ENCOUNTER — APPOINTMENT (OUTPATIENT)
Dept: NEUROLOGY | Facility: CLINIC | Age: 71
End: 2021-10-14
Payer: MEDICARE

## 2021-10-14 VITALS
TEMPERATURE: 97.4 F | HEIGHT: 71 IN | BODY MASS INDEX: 28.84 KG/M2 | HEART RATE: 80 BPM | WEIGHT: 206 LBS | SYSTOLIC BLOOD PRESSURE: 106 MMHG | OXYGEN SATURATION: 98 % | DIASTOLIC BLOOD PRESSURE: 66 MMHG

## 2021-10-14 PROCEDURE — 99215 OFFICE O/P EST HI 40 MIN: CPT

## 2021-10-14 RX ORDER — ROSUVASTATIN CALCIUM 20 MG/1
20 TABLET, FILM COATED ORAL
Qty: 30 | Refills: 0 | Status: DISCONTINUED | COMMUNITY
Start: 2021-05-26 | End: 2021-10-14

## 2021-10-14 NOTE — DISCUSSION/SUMMARY
[FreeTextEntry1] : Patient is 70 yo man with PMHx of uncontrolled DM, osteomyelitis, depression, HLD, HTN, who presents as HFU from 8/24/21 for interictal activity in setting of ?benzo/street drug abuse (Utox +Lorazepam)/uncontrolled hyperglycemia/metabolic encephalopathy, and small left occipital cortical infarct, etiology possibly cardioembolic and CTA H/N reporting diminutive VB system, functional b/l fetal PCA, left VA smaller than right, occluded at terminus, basilar being fed predominately by right VA.  Initial presentation included R sided weakness and AMS. FS at scene was >600. On arrival to ED he had global aphasia, severe dysarthria and confusion. He had multiple inpatient EEGs showing interictal activity/sharp transients mainly in bifrontal/FP regions. Discharge note on [facility] stated he was on Lacosimide and Keppra, DAPT, and Atorvastatin 40, but today, per daughter he is not on Lacosimide or statin. , A1c 15.  Exam today significant for spontaneous twitching of lower lip and tongue, which per daughter was present prior related to emotional changes, but now is constant. Otherwise, NIHSS 0. Will check Keppra level. He was on ASA 81 PTA. \par \par PLAN: \par -Take Atorvastatin 40 and repeat lipid panel next visit \par -Finish DAPT x21 day and then Plavix monotherapy\par -C/w Keppra 1000 q12 \par -Keppra trough explained and ordered \par -Follow up with ILR \par -Advised about possible withdrawal effects of being on benzos \par -Glycemic control per endocrinology w/ referral\par -Follow up in 4 months

## 2021-10-14 NOTE — REASON FOR VISIT
[Post Hospitalization] : a post hospitalization visit [Family Member] : family member [FreeTextEntry1] : for interictal activity in setting of ?benzo/street drug abuse (Utox +Lorazepam)/uncontrolled hyperglycemia/metabolic encephalopathy, and small left occipital cortical infarct, etiology possibly cardioembolic

## 2021-10-14 NOTE — HISTORY OF PRESENT ILLNESS
[FreeTextEntry1] : Patient is 72 yo man with PMHx of DM, osteomyelitis, GERD, depression, HLD, HTN, who presents as HFU from 8/24/21 for interictal activity in setting of ?benzo/street drug abuse (Utox +Lorazepam)/uncontrolled hyperglycemia/metabolic encephalopathy, and small left occipital cortical infarct, etiology possibly cardioembolic and CTA H/N reporting diminutive VB system, functional b/l fetal PCA, left VA smaller than right, occluded at terminus, basilar being fed predominately by right VA.  \par \par He presented to the ED w/ c/o of R sided weakness. On way to hospital, he became altered. FS at scene was >600.  Further history from wife states that he was sleepier than usual PTA. He went to [place] and came home with RUE weakness, started shaking his R arm, slurred speech and seemed to be confused. Stroke code called on arrival, NIHSS 9 for global aphasia, severe dysarthria and confusion. In CT scan patient had 2 witnessed seizure episodes, one first over the left leg that lasted few seconds, then he had GTC seizure that lasted for >2 min requiring Ativan 2 mg x2. CTA done with no LVO and CTP was non diagnostic. Labs significant for glucose 1052 and Na 124 corrected for glucose. He was intubated. He was started on Keppra. 24hr vEEG on 8/25/21 reported background with bursts of diffusely expressed sharply contoured theta with shifting asymmetry admixed with sharp transients and perhaps a preceding low amplitude spike. There was moderated to severe generalized slowing, but no seizures captured. Per daughter, no hx of sz. Per ex-wife, patient uses benzos/other street drugs (Utox positive for Lorazepam). Repeat 24hr vEEG on 8/26/21 reported rare bifrontal-FP sharp transients. Repeat 24hr vEEG on 8/27/21 reported no interictal activity. Repeat EEG on 8/29-8/31 suggested encephalopathy with irritable focus of bifrontal/FP. EEG on 8/30/21 reported small number of bifrontal/FP sharp waves that are probably epileptiform in nature. MRI brain with small left occipital cortical infarct. Possibly cardioembolic in etiology. CTA head/neck with diminutive VB system, functional b/l fetal PCA, left VA smaller than right, occluded at terminus, basilar being fed predominately by right VA. No significant ICA stenosis appreciated. ILR placed 9/8/21. Tox screen was (+) for benzos. He was discharged on ASA 81 and Atorvastatin 40, Plavix 75, Lacosimide 100 q12, Keppra 1000 q12, Olanzapine 5 QHS. , A1c 15.  \par \par Today patient presents to clinic with daughter and denies any seizure like episodes. He is not taking Atorvastatin. He is only on Keppra, not Lacosamide. He was on ASA PTA. He has ILR, no events since. Patient is denying any illicit drug use. \par

## 2021-10-14 NOTE — PHYSICAL EXAM
[FreeTextEntry1] : Focal neurological exam:\par \par MS: Awake, alert, oriented to person, place, situation and time. Normal affect. Follows commands. \par \par Language: Speech is clear, fluent with good repetition & comprehension. No dysarthria. \par \par CNs 2 - 12 intact. EOMI no nystagmus, no diplopia. VFF. No facial asymmetry b/l, full eye closure strength b/l. Hearing grossly normal. Head turning & shoulder shrug intact b/l. Tongue midline, normal movements, no atrophy.\par \par Motor: Normal muscle bulk & tone. No noticeable tremor or seizure. No pronator drift. Muscle strength of b/l UE and b/l LE 5/5. Spontaneous twitching of lower lip and tongue that is persistent. \par \par Reflexes: DTR of biceps 1+, knees 2+  \par \par Sensation: Intact to LT, temperature and vibration b/l throughout\par \par Cortical: No extinction\par \par Coordination: No dysmetria to FTN.\par \par Gait: No postural instability. Ambulates with rolling walker. \par \par \par \par \par

## 2021-10-25 ENCOUNTER — NON-APPOINTMENT (OUTPATIENT)
Age: 71
End: 2021-10-25

## 2021-10-25 LAB — LEVETIRACETAM SERPL-MCNC: 61.1 UG/ML

## 2021-10-26 ENCOUNTER — NON-APPOINTMENT (OUTPATIENT)
Age: 71
End: 2021-10-26

## 2021-11-04 ENCOUNTER — OUTPATIENT (OUTPATIENT)
Dept: OUTPATIENT SERVICES | Facility: HOSPITAL | Age: 71
LOS: 1 days | Discharge: HOME | End: 2021-11-04

## 2021-11-04 ENCOUNTER — APPOINTMENT (OUTPATIENT)
Dept: PODIATRY | Facility: CLINIC | Age: 71
End: 2021-11-04
Payer: MEDICARE

## 2021-11-04 DIAGNOSIS — Z89.421 ACQUIRED ABSENCE OF OTHER RIGHT TOE(S): Chronic | ICD-10-CM

## 2021-11-04 DIAGNOSIS — Z89.429 ACQUIRED ABSENCE OF OTHER TOE(S), UNSPECIFIED SIDE: Chronic | ICD-10-CM

## 2021-11-04 PROCEDURE — 97763 ORTHC/PROSTC MGMT SBSQ ENC: CPT | Mod: GP

## 2021-11-05 NOTE — PROCEDURE
[] : A mold was applied. [FreeTextEntry1] : callous debrided to right tma. good skin integrity throughout. added ppt to under inserts to pt's sneakers. went over wearing precautions, any sign of pressure not to wear sneakers. feet moisturized with a and d ointment. pt if no problems will return in approximately 2 months

## 2021-11-05 NOTE — END OF VISIT
[Resident] : Resident [FreeTextEntry2] : modification of inserts indicated to reduce pressure to weight, pressure baring regions\par

## 2021-11-09 DIAGNOSIS — Z89.419 ACQUIRED ABSENCE OF UNSPECIFIED GREAT TOE: ICD-10-CM

## 2021-11-09 DIAGNOSIS — E11.40 TYPE 2 DIABETES MELLITUS WITH DIABETIC NEUROPATHY, UNSPECIFIED: ICD-10-CM

## 2021-11-17 ENCOUNTER — NON-APPOINTMENT (OUTPATIENT)
Age: 71
End: 2021-11-17

## 2021-11-17 ENCOUNTER — APPOINTMENT (OUTPATIENT)
Dept: CARDIOLOGY | Facility: CLINIC | Age: 71
End: 2021-11-17
Payer: MEDICARE

## 2021-11-17 PROCEDURE — G2066: CPT | Mod: NC

## 2021-11-17 PROCEDURE — 93298 REM INTERROG DEV EVAL SCRMS: CPT

## 2021-11-23 ENCOUNTER — APPOINTMENT (OUTPATIENT)
Dept: CARDIOLOGY | Facility: CLINIC | Age: 71
End: 2021-11-23
Payer: MEDICARE

## 2021-11-23 VITALS
HEART RATE: 81 BPM | DIASTOLIC BLOOD PRESSURE: 73 MMHG | BODY MASS INDEX: 25.2 KG/M2 | TEMPERATURE: 95.9 F | SYSTOLIC BLOOD PRESSURE: 108 MMHG | WEIGHT: 180 LBS | HEIGHT: 71 IN

## 2021-11-23 DIAGNOSIS — Z87.891 PERSONAL HISTORY OF NICOTINE DEPENDENCE: ICD-10-CM

## 2021-11-23 DIAGNOSIS — F19.11 OTHER PSYCHOACTIVE SUBSTANCE ABUSE, IN REMISSION: ICD-10-CM

## 2021-11-23 DIAGNOSIS — L89.892 PRESSURE ULCER OF OTHER SITE, STAGE 2: ICD-10-CM

## 2021-11-23 DIAGNOSIS — I70.248 ATHEROSCLEROSIS OF NAT ARTRS OF LT LEG W/ UCLRTION OF OTHER PART OF LWR LEG: ICD-10-CM

## 2021-11-23 DIAGNOSIS — B37.9 CANDIDIASIS, UNSPECIFIED: ICD-10-CM

## 2021-11-23 DIAGNOSIS — B35.1 TINEA UNGUIUM: ICD-10-CM

## 2021-11-23 DIAGNOSIS — R56.9 UNSPECIFIED CONVULSIONS: ICD-10-CM

## 2021-11-23 DIAGNOSIS — I96 GANGRENE, NOT ELSEWHERE CLASSIFIED: ICD-10-CM

## 2021-11-23 DIAGNOSIS — K21.9 GASTRO-ESOPHAGEAL REFLUX DISEASE W/OUT ESOPHAGITIS: ICD-10-CM

## 2021-11-23 PROCEDURE — 93000 ELECTROCARDIOGRAM COMPLETE: CPT

## 2021-11-23 PROCEDURE — 99214 OFFICE O/P EST MOD 30 MIN: CPT

## 2021-11-23 RX ORDER — INSULIN GLARGINE 100 [IU]/ML
100 INJECTION, SOLUTION SUBCUTANEOUS
Refills: 0 | Status: COMPLETED | COMMUNITY
End: 2021-11-23

## 2021-11-23 RX ORDER — ALBUTEROL SULFATE 90 UG/1
108 (90 BASE) INHALANT RESPIRATORY (INHALATION)
Qty: 8 | Refills: 0 | Status: COMPLETED | COMMUNITY
Start: 2021-09-21 | End: 2021-11-23

## 2021-11-23 RX ORDER — ALBUTEROL 90 MCG
90 AEROSOL (GRAM) INHALATION
Refills: 0 | Status: COMPLETED | COMMUNITY
End: 2021-11-23

## 2021-11-23 RX ORDER — LEVETIRACETAM 1000 MG/1
1000 TABLET, FILM COATED ORAL
Qty: 60 | Refills: 0 | Status: COMPLETED | COMMUNITY
Start: 2021-09-21 | End: 2021-11-23

## 2021-11-23 RX ORDER — ATORVASTATIN CALCIUM 40 MG/1
40 TABLET, FILM COATED ORAL
Qty: 90 | Refills: 2 | Status: COMPLETED | COMMUNITY
Start: 2021-10-14 | End: 2021-11-23

## 2021-11-23 RX ORDER — LEVETIRACETAM 500 MG/1
500 TABLET, FILM COATED ORAL TWICE DAILY
Refills: 0 | Status: COMPLETED | COMMUNITY
End: 2021-11-23

## 2021-11-23 RX ORDER — UMECLIDINIUM 62.5 UG/1
62.5 AEROSOL, POWDER ORAL
Qty: 30 | Refills: 0 | Status: COMPLETED | COMMUNITY
Start: 2021-09-21 | End: 2021-11-23

## 2021-11-23 RX ORDER — DULAGLUTIDE 0.75 MG/.5ML
0.75 INJECTION, SOLUTION SUBCUTANEOUS
Qty: 2 | Refills: 0 | Status: COMPLETED | COMMUNITY
Start: 2021-09-21 | End: 2021-11-23

## 2021-11-23 RX ORDER — ASPIRIN 81 MG/1
81 TABLET, COATED ORAL DAILY
Refills: 0 | Status: COMPLETED | COMMUNITY
End: 2021-11-23

## 2021-11-23 NOTE — ASSESSMENT
[FreeTextEntry1] : Cryptogenic CVA\par CAD risk equivalent - secondary prevention. Goals of LDL, BP discussed.\par ILR - no evidence of AF\par DM, DL, HTN\par \par Plan:\par \par F/u with EP ofr ILR interrogation\par F/u with neurology post CVA\par F/u with podiatry for DFU, PVD\par Endocrine f/u for DM control.\par Tight control of DM, lipids, BP\par C/w statin. C/w ASA/Plavix\par Echo noted.\par Hospital records reviewed.\par Return to cardiology clinic in 6 months or if any symptoms.

## 2021-11-23 NOTE — PHYSICAL EXAM
[No Acute Distress] : no acute distress [Frail] : frail [No Carotid Bruit] : no carotid bruit [Normal S1, S2] : normal S1, S2 [No Murmur] : no murmur [Clear Lung Fields] : clear lung fields [Good Air Entry] : good air entry [No Respiratory Distress] : no respiratory distress  [Soft] : abdomen soft [Non Tender] : non-tender [No Edema] : no edema [No Cyanosis] : no cyanosis [No Rash] : no rash [Moves all extremities] : moves all extremities [de-identified] : PERRL [de-identified] : NC/AT [de-identified] : no JVD [de-identified] : walks with a cane [de-identified] : s/p toe amputation [de-identified] : withdrawn, but appropriate and responsive

## 2021-11-23 NOTE — HISTORY OF PRESENT ILLNESS
[FreeTextEntry1] : 72 y/o male with history of HTN, DL, DM2, bipolar disorder, osteomyelitis, s/p amputation, PVD, CVA, substance abuse, was in the hospital in August 2021 with an acute CVA, due to cryptogenic nature of CVA, an ILR was placed. Echo revealed normal LV function. Patient is no antiplatelet therapy and statin. BP is controlled. Presenting for general cardiology evaluation. Last device interrogation was 10/11/21, revealing no A. fib.

## 2021-11-23 NOTE — REVIEW OF SYSTEMS
[Feeling Fatigued] : feeling fatigued [Blurry Vision] : blurred vision [Urinary Frequency] : urinary frequency [Joint Pain] : joint pain [Dizziness] : dizziness [Negative] : Heme/Lymph [FreeTextEntry7] : GERD [de-identified] : diabetic ulcer

## 2021-12-22 ENCOUNTER — NON-APPOINTMENT (OUTPATIENT)
Age: 71
End: 2021-12-22

## 2021-12-22 ENCOUNTER — APPOINTMENT (OUTPATIENT)
Dept: CARDIOLOGY | Facility: CLINIC | Age: 71
End: 2021-12-22
Payer: MEDICARE

## 2021-12-22 PROCEDURE — G2066: CPT

## 2021-12-22 PROCEDURE — 93298 REM INTERROG DEV EVAL SCRMS: CPT

## 2021-12-29 ENCOUNTER — OUTPATIENT (OUTPATIENT)
Dept: OUTPATIENT SERVICES | Facility: HOSPITAL | Age: 71
LOS: 1 days | Discharge: HOME | End: 2021-12-29

## 2021-12-29 DIAGNOSIS — I63.9 CEREBRAL INFARCTION, UNSPECIFIED: ICD-10-CM

## 2021-12-29 DIAGNOSIS — H53.9 UNSPECIFIED VISUAL DISTURBANCE: ICD-10-CM

## 2021-12-29 DIAGNOSIS — R41.89 OTHER SYMPTOMS AND SIGNS INVOLVING COGNITIVE FUNCTIONS AND AWARENESS: ICD-10-CM

## 2021-12-29 DIAGNOSIS — Z89.421 ACQUIRED ABSENCE OF OTHER RIGHT TOE(S): Chronic | ICD-10-CM

## 2021-12-29 DIAGNOSIS — Z89.429 ACQUIRED ABSENCE OF OTHER TOE(S), UNSPECIFIED SIDE: Chronic | ICD-10-CM

## 2022-01-06 ENCOUNTER — OUTPATIENT (OUTPATIENT)
Dept: OUTPATIENT SERVICES | Facility: HOSPITAL | Age: 72
LOS: 1 days | Discharge: HOME | End: 2022-01-06

## 2022-01-06 ENCOUNTER — APPOINTMENT (OUTPATIENT)
Dept: PODIATRY | Facility: CLINIC | Age: 72
End: 2022-01-06
Payer: MEDICARE

## 2022-01-06 DIAGNOSIS — Z89.429 ACQUIRED ABSENCE OF OTHER TOE(S), UNSPECIFIED SIDE: Chronic | ICD-10-CM

## 2022-01-06 DIAGNOSIS — Z89.421 ACQUIRED ABSENCE OF OTHER RIGHT TOE(S): Chronic | ICD-10-CM

## 2022-01-06 PROCEDURE — 99213 OFFICE O/P EST LOW 20 MIN: CPT

## 2022-01-07 NOTE — PHYSICAL EXAM
[General Appearance - Alert] : alert [General Appearance - In No Acute Distress] : in no acute distress [1+] : left foot dorsalis pedis 1+ [Skin Lesions] : no skin lesions [Vibration Dec.] : diminished vibratory sensation at the level of the toes [de-identified] : right  TMA, left partial hallux and 2nd toe amputation  [Foot Ulcer] : no foot ulcer [Skin Induration] : no skin induration [Diminished Throughout Right Foot] : normal sensation with monofilament testing throughout right foot [Diminished Throughout Left Foot] : normal sensation with monofilament testing throughout left foot

## 2022-01-07 NOTE — HISTORY OF PRESENT ILLNESS
[FreeTextEntry1] : 71 year old M  presents for a diabetic foot evaluation.  Last A1c was 8% 11/2021. History of foot amputations \par

## 2022-01-07 NOTE — ASSESSMENT
[FreeTextEntry1] : Modified ADA Diabetic Foot Risk Classification 3\par \par -Loss of protective sensation: yes \par -Presence of foot deformity:  yes  \par -presence of pre-ulcerative lesion:no\par   -hemorrhage into callus:  no\par -atrophy of heel or metatarsal fat pads: no\par \par -Diabetic neurovascular foot assessment  performed. \par -Discussed with patient diabetic foot hygiene.Patient instructed to regularly check the bottom of the feet\par -return 2 month\par \par \par

## 2022-01-11 ENCOUNTER — APPOINTMENT (OUTPATIENT)
Dept: PODIATRY | Facility: CLINIC | Age: 72
End: 2022-01-11

## 2022-01-12 DIAGNOSIS — E11.40 TYPE 2 DIABETES MELLITUS WITH DIABETIC NEUROPATHY, UNSPECIFIED: ICD-10-CM

## 2022-01-12 DIAGNOSIS — Z89.431 ACQUIRED ABSENCE OF RIGHT FOOT: ICD-10-CM

## 2022-01-12 DIAGNOSIS — Z89.419 ACQUIRED ABSENCE OF UNSPECIFIED GREAT TOE: ICD-10-CM

## 2022-01-21 ENCOUNTER — APPOINTMENT (OUTPATIENT)
Dept: ENDOCRINOLOGY | Facility: CLINIC | Age: 72
End: 2022-01-21

## 2022-01-26 ENCOUNTER — APPOINTMENT (OUTPATIENT)
Dept: CARDIOLOGY | Facility: CLINIC | Age: 72
End: 2022-01-26
Payer: MEDICARE

## 2022-01-26 ENCOUNTER — NON-APPOINTMENT (OUTPATIENT)
Age: 72
End: 2022-01-26

## 2022-01-26 PROCEDURE — G2066: CPT

## 2022-01-26 PROCEDURE — 93298 REM INTERROG DEV EVAL SCRMS: CPT

## 2022-02-03 ENCOUNTER — APPOINTMENT (OUTPATIENT)
Dept: NEUROLOGY | Facility: CLINIC | Age: 72
End: 2022-02-03

## 2022-02-16 ENCOUNTER — OUTPATIENT (OUTPATIENT)
Dept: OUTPATIENT SERVICES | Facility: HOSPITAL | Age: 72
LOS: 1 days | Discharge: HOME | End: 2022-02-16

## 2022-02-16 DIAGNOSIS — H53.9 UNSPECIFIED VISUAL DISTURBANCE: ICD-10-CM

## 2022-02-16 DIAGNOSIS — Z89.429 ACQUIRED ABSENCE OF OTHER TOE(S), UNSPECIFIED SIDE: Chronic | ICD-10-CM

## 2022-02-16 DIAGNOSIS — I63.9 CEREBRAL INFARCTION, UNSPECIFIED: ICD-10-CM

## 2022-02-16 DIAGNOSIS — Z89.421 ACQUIRED ABSENCE OF OTHER RIGHT TOE(S): Chronic | ICD-10-CM

## 2022-02-16 DIAGNOSIS — R41.89 OTHER SYMPTOMS AND SIGNS INVOLVING COGNITIVE FUNCTIONS AND AWARENESS: ICD-10-CM

## 2022-03-03 ENCOUNTER — NON-APPOINTMENT (OUTPATIENT)
Age: 72
End: 2022-03-03

## 2022-03-03 ENCOUNTER — APPOINTMENT (OUTPATIENT)
Dept: CARDIOLOGY | Facility: CLINIC | Age: 72
End: 2022-03-03
Payer: MEDICARE

## 2022-03-03 PROCEDURE — G2066: CPT

## 2022-03-03 PROCEDURE — 93298 REM INTERROG DEV EVAL SCRMS: CPT

## 2022-03-10 ENCOUNTER — APPOINTMENT (OUTPATIENT)
Dept: PODIATRY | Facility: CLINIC | Age: 72
End: 2022-03-10

## 2022-03-21 ENCOUNTER — APPOINTMENT (OUTPATIENT)
Dept: PODIATRY | Facility: CLINIC | Age: 72
End: 2022-03-21
Payer: MEDICARE

## 2022-03-21 ENCOUNTER — OUTPATIENT (OUTPATIENT)
Dept: OUTPATIENT SERVICES | Facility: HOSPITAL | Age: 72
LOS: 1 days | Discharge: HOME | End: 2022-03-21

## 2022-03-21 DIAGNOSIS — Z89.429 ACQUIRED ABSENCE OF OTHER TOE(S), UNSPECIFIED SIDE: Chronic | ICD-10-CM

## 2022-03-21 DIAGNOSIS — Z89.421 ACQUIRED ABSENCE OF OTHER RIGHT TOE(S): Chronic | ICD-10-CM

## 2022-03-21 PROCEDURE — 99213 OFFICE O/P EST LOW 20 MIN: CPT

## 2022-03-22 NOTE — PHYSICAL EXAM
[General Appearance - Alert] : alert [General Appearance - In No Acute Distress] : in no acute distress [1+] : right foot dorsalis pedis 1+ [0] : left foot dorsalis pedis 0 [Skin Lesions] : no skin lesions [Vibration Dec.] : diminished vibratory sensation at the level of the toes [Oriented To Time, Place, And Person] : oriented to person, place, and time [Impaired Insight] : insight and judgment were intact [de-identified] : right  TMA, left partial hallux and 2nd toe amputation  [Foot Ulcer] : no foot ulcer [Skin Induration] : no skin induration [Diminished Throughout Right Foot] : normal sensation with monofilament testing throughout right foot [Diminished Throughout Left Foot] : normal sensation with monofilament testing throughout left foot

## 2022-03-22 NOTE — HISTORY OF PRESENT ILLNESS
[FreeTextEntry1] : 72 year old M  presents for a diabetic foot evaluation.  Last A1c was 9.2 % 2/2022. History of foot amputations \par

## 2022-03-22 NOTE — ASSESSMENT
[FreeTextEntry1] : Modified ADA Diabetic Foot Risk Classification 3\par \par -Loss of protective sensation: yes \par -Presence of foot deformity:  yes  \par -presence of pre-ulcerative lesion:no\par   -hemorrhage into callus:  no\par -atrophy of heel or metatarsal fat pads: no\par \par -Diabetic neurovascular foot assessment  performed. \par -Discussed with patient diabetic foot hygiene.Patient instructed to regularly check the bottom of the feet\par -nails debrided\par -referred for SHABANA/PVR\par -return 3 month\par \par Class A\par -non-traumatic amputation of foot/digit\par \par

## 2022-03-29 DIAGNOSIS — Z89.419 ACQUIRED ABSENCE OF UNSPECIFIED GREAT TOE: ICD-10-CM

## 2022-03-29 DIAGNOSIS — E11.40 TYPE 2 DIABETES MELLITUS WITH DIABETIC NEUROPATHY, UNSPECIFIED: ICD-10-CM

## 2022-03-29 DIAGNOSIS — I70.245 ATHEROSCLEROSIS OF NATIVE ARTERIES OF LEFT LEG WITH ULCERATION OF OTHER PART OF FOOT: ICD-10-CM

## 2022-03-29 DIAGNOSIS — Z89.431 ACQUIRED ABSENCE OF RIGHT FOOT: ICD-10-CM

## 2022-04-07 ENCOUNTER — APPOINTMENT (OUTPATIENT)
Dept: CARDIOLOGY | Facility: CLINIC | Age: 72
End: 2022-04-07
Payer: MEDICARE

## 2022-04-07 ENCOUNTER — NON-APPOINTMENT (OUTPATIENT)
Age: 72
End: 2022-04-07

## 2022-04-07 PROCEDURE — G2066: CPT

## 2022-04-07 PROCEDURE — 93298 REM INTERROG DEV EVAL SCRMS: CPT

## 2022-04-15 NOTE — REASON FOR VISIT
Dr Mason [Follow-Up] : a follow-up visit [Spouse] : spouse [FreeTextEntry1] : gangrenous changes to the left  forefoot. s/p foreign body removal left submet 2  12/28, Pt is here today for treatment of unrelated condition to the post op period

## 2022-04-18 ENCOUNTER — APPOINTMENT (OUTPATIENT)
Dept: CARDIOLOGY | Facility: CLINIC | Age: 72
End: 2022-04-18
Payer: MEDICARE

## 2022-04-18 VITALS
WEIGHT: 189 LBS | HEART RATE: 69 BPM | SYSTOLIC BLOOD PRESSURE: 162 MMHG | BODY MASS INDEX: 26.46 KG/M2 | TEMPERATURE: 97.9 F | DIASTOLIC BLOOD PRESSURE: 86 MMHG | HEIGHT: 71 IN

## 2022-04-18 PROCEDURE — 93000 ELECTROCARDIOGRAM COMPLETE: CPT

## 2022-04-18 PROCEDURE — 99213 OFFICE O/P EST LOW 20 MIN: CPT

## 2022-04-18 NOTE — HISTORY OF PRESENT ILLNESS
[Palpitations] : no palpitations [SOB] : no dyspnea [Syncope] : no syncope [Dizziness] : no dizziness [Chest Pain] : no chest pain or discomfort [Pain at Site] : no pain at device site [Erythema at Site] : no erythema at device site [Swelling at Site] : no swelling at device site [de-identified] : PCP: Dr. Shelton\par Cardiologist: Dr. Albarado\par \par The patient underwent placement of ILR on 9/8/2021 for cryptogenic CVA. He has no cardiac complaints.

## 2022-04-18 NOTE — CARDIOLOGY SUMMARY
[de-identified] : 4/18/22: SR 69 bpm, non-spec T wave abnormality\par ECG (10/11/2021): Sinus rhythm at 86 bpm [de-identified] : 2D ECHO (9/7/2021): EF normal, mildly enlarged LA, normal RA

## 2022-04-18 NOTE — PROCEDURE
[See Device Printout] : See device printout [de-identified] : LNQ11 [de-identified] : GLORIA [de-identified] : [id number] [de-identified] : 9/8/2021 [de-identified] : Good [de-identified] : 3 false AF events c/w SR with PACs

## 2022-04-18 NOTE — DISCUSSION/SUMMARY
[FreeTextEntry1] : Mr. Tariq Ceja is a pleasant 72-year-old man with hypertension, hyperlipidemia, diabetes mellitus, depression, GERD, admitted to [facility] in September 2021 for cryptogenic stroke. I recommend an ILR implant for this patient for long term detection of arrhythmias as a cause of his symptoms. Patient underwent implant of ILR on 9/8/2021.\par \par His wound is healed well. There are no signs and symptoms of inflammation. I interrogated his device as described above. Patient is enrolled in remote monitoring services. He is transmitting. 3 episodes on 11/29/21 are false AF - SR with APCs. \par \par His BP is elevated, manual recheck /90. Patient has run out of meds x 2 days, seeing his PCP Dr. Shelton tomorrow. He denies s/s hypertension. I asked him to call his PCP to refill - he says he will but it seems he will wait until he sees him tomorrow.\par \par The patient will return to our office in 9 months. If you have any questions please contact our office at [phone].

## 2022-04-18 NOTE — REASON FOR VISIT
[___ Device Check] : is here today for a [unfilled] device check for [Other ___] : [unfilled] [Family Member] : family member [Other: _____] : [unfilled]

## 2022-04-18 NOTE — PHYSICAL EXAM
[General Appearance - Well Developed] : well developed [Normal Appearance] : normal appearance [Well Groomed] : well groomed [General Appearance - Well Nourished] : well nourished [No Deformities] : no deformities [General Appearance - In No Acute Distress] : no acute distress [Heart Rate And Rhythm] : heart rate and rhythm were normal [Heart Sounds] : normal S1 and S2 [Murmurs] : no murmurs present [] : no respiratory distress [Respiration, Rhythm And Depth] : normal respiratory rhythm and effort [Exaggerated Use Of Accessory Muscles For Inspiration] : no accessory muscle use [Auscultation Breath Sounds / Voice Sounds] : lungs were clear to auscultation bilaterally [Clean] : clean [Dry] : dry [Well-Healed] : well-healed [Abdomen Soft] : soft [Nail Clubbing] : no clubbing of the fingernails [Cyanosis, Localized] : no localized cyanosis [FreeTextEntry1] : Parasternal

## 2022-05-12 ENCOUNTER — APPOINTMENT (OUTPATIENT)
Dept: ENDOCRINOLOGY | Facility: CLINIC | Age: 72
End: 2022-05-12

## 2022-05-19 ENCOUNTER — APPOINTMENT (OUTPATIENT)
Dept: CARDIOLOGY | Facility: CLINIC | Age: 72
End: 2022-05-19
Payer: MEDICARE

## 2022-05-19 ENCOUNTER — NON-APPOINTMENT (OUTPATIENT)
Age: 72
End: 2022-05-19

## 2022-05-19 PROCEDURE — 93298 REM INTERROG DEV EVAL SCRMS: CPT

## 2022-05-19 PROCEDURE — G2066: CPT

## 2022-05-23 ENCOUNTER — OUTPATIENT (OUTPATIENT)
Dept: OUTPATIENT SERVICES | Facility: HOSPITAL | Age: 72
LOS: 1 days | Discharge: HOME | End: 2022-05-23

## 2022-05-23 ENCOUNTER — APPOINTMENT (OUTPATIENT)
Dept: PODIATRY | Facility: CLINIC | Age: 72
End: 2022-05-23
Payer: MEDICARE

## 2022-05-23 DIAGNOSIS — B35.3 TINEA PEDIS: ICD-10-CM

## 2022-05-23 DIAGNOSIS — Z89.429 ACQUIRED ABSENCE OF OTHER TOE(S), UNSPECIFIED SIDE: Chronic | ICD-10-CM

## 2022-05-23 DIAGNOSIS — Z89.421 ACQUIRED ABSENCE OF OTHER RIGHT TOE(S): Chronic | ICD-10-CM

## 2022-05-23 PROCEDURE — 99213 OFFICE O/P EST LOW 20 MIN: CPT

## 2022-05-24 ENCOUNTER — APPOINTMENT (OUTPATIENT)
Dept: CARDIOLOGY | Facility: CLINIC | Age: 72
End: 2022-05-24

## 2022-05-25 PROBLEM — B35.3 TINEA PEDIS OF BOTH FEET: Status: ACTIVE | Noted: 2020-12-30

## 2022-05-25 NOTE — PHYSICAL EXAM
[General Appearance - Alert] : alert [General Appearance - In No Acute Distress] : in no acute distress [1+] : right foot dorsalis pedis 1+ [0] : left foot dorsalis pedis 0 [Skin Lesions] : no skin lesions [Vibration Dec.] : diminished vibratory sensation at the level of the toes [Oriented To Time, Place, And Person] : oriented to person, place, and time [Impaired Insight] : insight and judgment were intact [de-identified] : right  TMA, left partial hallux and 2nd toe amputation  [Foot Ulcer] : no foot ulcer [Skin Induration] : no skin induration [FreeTextEntry1] : annular skin scaling b/l feet  [Diminished Throughout Right Foot] : normal sensation with monofilament testing throughout right foot [Diminished Throughout Left Foot] : normal sensation with monofilament testing throughout left foot

## 2022-05-25 NOTE — HISTORY OF PRESENT ILLNESS
[FreeTextEntry1] : 72 year old M  presents for a diabetic foot evaluation.  Last A1c was 10.8% % 4/2022. History of foot amputations \par

## 2022-05-25 NOTE — ASSESSMENT
[FreeTextEntry1] : Modified ADA Diabetic Foot Risk Classification 3\par \par -Loss of protective sensation: yes \par -Presence of foot deformity:  yes  \par -presence of pre-ulcerative lesion:no\par   -hemorrhage into callus:  no\par -atrophy of heel or metatarsal fat pads: no\par \par -Diabetic neurovascular foot assessment  performed. \par -Discussed with patient diabetic foot hygiene.Patient instructed to regularly check the bottom of the feet\par -referred to vascular to evaluate for PAD\par -rx ciclopirox \par \par Class A\par -non-traumatic amputation of foot/digit\par \par

## 2022-06-20 NOTE — ED ADULT NURSE NOTE - BREATHING, MLM
Advise patients wife of a previous message requesting the same information  She verbalized understanding  Done 
Patient is requesting a referral for a parkinson's specialist 
[place]

## 2022-06-22 ENCOUNTER — APPOINTMENT (OUTPATIENT)
Dept: CARDIOLOGY | Facility: CLINIC | Age: 72
End: 2022-06-22

## 2022-06-22 ENCOUNTER — NON-APPOINTMENT (OUTPATIENT)
Age: 72
End: 2022-06-22

## 2022-06-22 PROCEDURE — G2066: CPT

## 2022-06-22 PROCEDURE — 93298 REM INTERROG DEV EVAL SCRMS: CPT

## 2022-06-27 ENCOUNTER — APPOINTMENT (OUTPATIENT)
Dept: PODIATRY | Facility: CLINIC | Age: 72
End: 2022-06-27
Payer: MEDICARE

## 2022-06-27 ENCOUNTER — OUTPATIENT (OUTPATIENT)
Dept: OUTPATIENT SERVICES | Facility: HOSPITAL | Age: 72
LOS: 1 days | Discharge: HOME | End: 2022-06-27

## 2022-06-27 DIAGNOSIS — Z89.421 ACQUIRED ABSENCE OF OTHER RIGHT TOE(S): Chronic | ICD-10-CM

## 2022-06-27 DIAGNOSIS — Z89.429 ACQUIRED ABSENCE OF OTHER TOE(S), UNSPECIFIED SIDE: Chronic | ICD-10-CM

## 2022-06-27 PROCEDURE — 99212 OFFICE O/P EST SF 10 MIN: CPT

## 2022-06-27 NOTE — PHYSICAL EXAM
[General Appearance - Alert] : alert [General Appearance - In No Acute Distress] : in no acute distress [1+] : right foot dorsalis pedis 1+ [0] : left foot dorsalis pedis 0 [Vibration Dec.] : diminished vibratory sensation at the level of the toes [Oriented To Time, Place, And Person] : oriented to person, place, and time [Impaired Insight] : insight and judgment were intact [de-identified] : right  TMA, left partial hallux and 2nd toe amputation  [Foot Ulcer] : no foot ulcer [Skin Induration] : no skin induration [FreeTextEntry1] : hyperkeratotic skin right TMA stump.  [Diminished Throughout Right Foot] : normal sensation with monofilament testing throughout right foot [Diminished Throughout Left Foot] : normal sensation with monofilament testing throughout left foot

## 2022-06-27 NOTE — ASSESSMENT
[FreeTextEntry1] : DIabetic foot eval performed\par nails debrided \par -f/u  vascular to evaluate for PAD\par  \par \par Class A\par -non-traumatic amputation of foot/digit\par \par

## 2022-07-22 NOTE — PATIENT PROFILE ADULT - FUNCTIONAL SCREEN CURRENT LEVEL: SWALLOWING (IF SCORE 2 OR MORE FOR ANY ITEM, CONSULT REHAB SERVICES), MLM)
0 = swallows foods/liquids without difficulty Hydroxyzine Counseling: Patient advised that the medication is sedating and not to drive a car after taking this medication.  Patient informed of potential adverse effects including but not limited to dry mouth, urinary retention, and blurry vision.  The patient verbalized understanding of the proper use and possible adverse effects of hydroxyzine.  All of the patient's questions and concerns were addressed.

## 2022-07-27 ENCOUNTER — APPOINTMENT (OUTPATIENT)
Dept: CARDIOLOGY | Facility: CLINIC | Age: 72
End: 2022-07-27

## 2022-07-27 ENCOUNTER — NON-APPOINTMENT (OUTPATIENT)
Age: 72
End: 2022-07-27

## 2022-07-27 PROCEDURE — G2066: CPT

## 2022-07-27 PROCEDURE — 93298 REM INTERROG DEV EVAL SCRMS: CPT

## 2022-08-02 ENCOUNTER — APPOINTMENT (OUTPATIENT)
Dept: PODIATRY | Facility: CLINIC | Age: 72
End: 2022-08-02

## 2022-08-25 ENCOUNTER — APPOINTMENT (OUTPATIENT)
Dept: PODIATRY | Facility: CLINIC | Age: 72
End: 2022-08-25

## 2022-08-25 ENCOUNTER — OUTPATIENT (OUTPATIENT)
Dept: OUTPATIENT SERVICES | Facility: HOSPITAL | Age: 72
LOS: 1 days | Discharge: HOME | End: 2022-08-25

## 2022-08-25 DIAGNOSIS — Z89.429 ACQUIRED ABSENCE OF OTHER TOE(S), UNSPECIFIED SIDE: Chronic | ICD-10-CM

## 2022-08-25 DIAGNOSIS — Z89.421 ACQUIRED ABSENCE OF OTHER RIGHT TOE(S): Chronic | ICD-10-CM

## 2022-08-25 PROCEDURE — 99213 OFFICE O/P EST LOW 20 MIN: CPT

## 2022-08-26 DIAGNOSIS — E11.40 TYPE 2 DIABETES MELLITUS WITH DIABETIC NEUROPATHY, UNSPECIFIED: ICD-10-CM

## 2022-08-26 DIAGNOSIS — L85.1 ACQUIRED KERATOSIS [KERATODERMA] PALMARIS ET PLANTARIS: ICD-10-CM

## 2022-08-26 DIAGNOSIS — E11.42 TYPE 2 DIABETES MELLITUS WITH DIABETIC POLYNEUROPATHY: ICD-10-CM

## 2022-08-26 NOTE — ASSESSMENT
[FreeTextEntry1] : Modified ADA Diabetic Foot Risk Classification 3 \par \par -Loss of protective sensation: yes \par -Presence of foot deformity:  yes  \par -presence of pre-ulcerative lesion: yes \par   -hemorrhage into callus: no\par -atrophy of heel or metatarsal fat pads:  no\par \par -Diabetic neurovascular foot assessment  performed. \par -Discussed with patient diabetic foot hygiene.Patient instructed to regularly check the bottom of the feet\par -Patient given a diabetic foot education sheet\par \par \par Class A\par -non-traumatic amputation of foot/digit\par \par

## 2022-08-26 NOTE — HISTORY OF PRESENT ILLNESS
[FreeTextEntry1] : 72 year old M  presents for a diabetic foot evaluation.  Last A1c was 12.1%  8/2022. History of foot amputations \par

## 2022-08-26 NOTE — PHYSICAL EXAM
[General Appearance - Alert] : alert [General Appearance - In No Acute Distress] : in no acute distress [1+] : right foot dorsalis pedis 1+ [0] : left foot dorsalis pedis 0 [Vibration Dec.] : diminished vibratory sensation at the level of the toes [Oriented To Time, Place, And Person] : oriented to person, place, and time [Impaired Insight] : insight and judgment were intact [de-identified] : right  TMA, left partial hallux and 2nd toe amputation  [Foot Ulcer] : no foot ulcer [Skin Induration] : no skin induration [FreeTextEntry1] : hyperkeratotic skin right TMA stump. \par left third toe distal tuft skin bruising likely from shoe pressure. no break in skin  [Diminished Throughout Right Foot] : normal sensation with monofilament testing throughout right foot [Diminished Throughout Left Foot] : normal sensation with monofilament testing throughout left foot

## 2022-08-31 ENCOUNTER — NON-APPOINTMENT (OUTPATIENT)
Age: 72
End: 2022-08-31

## 2022-08-31 ENCOUNTER — APPOINTMENT (OUTPATIENT)
Dept: CARDIOLOGY | Facility: CLINIC | Age: 72
End: 2022-08-31

## 2022-08-31 PROCEDURE — 93298 REM INTERROG DEV EVAL SCRMS: CPT

## 2022-08-31 PROCEDURE — G2066: CPT

## 2022-09-15 ENCOUNTER — APPOINTMENT (OUTPATIENT)
Dept: NEUROLOGY | Facility: CLINIC | Age: 72
End: 2022-09-15

## 2022-09-22 ENCOUNTER — APPOINTMENT (OUTPATIENT)
Dept: PODIATRY | Facility: CLINIC | Age: 72
End: 2022-09-22

## 2022-09-30 ENCOUNTER — OUTPATIENT (OUTPATIENT)
Dept: OUTPATIENT SERVICES | Facility: HOSPITAL | Age: 72
LOS: 1 days | Discharge: HOME | End: 2022-09-30

## 2022-09-30 ENCOUNTER — APPOINTMENT (OUTPATIENT)
Dept: PODIATRY | Facility: CLINIC | Age: 72
End: 2022-09-30

## 2022-09-30 DIAGNOSIS — L85.1 ACQUIRED KERATOSIS [KERATODERMA] PALMARIS ET PLANTARIS: ICD-10-CM

## 2022-09-30 DIAGNOSIS — Z89.429 ACQUIRED ABSENCE OF OTHER TOE(S), UNSPECIFIED SIDE: Chronic | ICD-10-CM

## 2022-09-30 DIAGNOSIS — Z89.421 ACQUIRED ABSENCE OF OTHER RIGHT TOE(S): Chronic | ICD-10-CM

## 2022-09-30 PROCEDURE — 99213 OFFICE O/P EST LOW 20 MIN: CPT | Mod: GC

## 2022-09-30 NOTE — END OF VISIT
[] : Resident [FreeTextEntry3] : nails debrided, along with callus right foot\par left third toe distal tuft preulcerative lesion has healed\par no open wounds\par follow up 3 jenny\par Class A\par -non-traumatic amputation of foot/digit\par \par \par

## 2022-09-30 NOTE — ASSESSMENT
[Verbal] : verbal [Patient] : patient [Good - alert, interested, motivated] : Good - alert, interested, motivated [FreeTextEntry1] : Modified ADA Diabetic Foot Risk Classification 3 \par \par -Loss of protective sensation: yes \par -Presence of foot deformity:  yes  \par -presence of pre-ulcerative lesion: yes \par   -hemorrhage into callus: no\par -atrophy of heel or metatarsal fat pads:  no\par \par -Diabetic neurovascular foot assessment  performed. \par -Discussed with patient diabetic foot hygiene.Patient instructed to regularly check the bottom of the feet\par -Patient given a diabetic foot education sheet\par \par \par Class A\par -non-traumatic amputation of foot/digit\par \par \par Left foot nails debrided with nail nipper x3\par Left 3rd distal digit pre-ulcerative lesion noted; healed/stable\par Right TMA stump sharp debridement with dermal curette down to smooth epithelial layer\par \par RTC 3 Months\par \par

## 2022-09-30 NOTE — PHYSICAL EXAM
[General Appearance - Alert] : alert [General Appearance - In No Acute Distress] : in no acute distress [1+] : right foot dorsalis pedis 1+ [0] : left foot dorsalis pedis 0 [Vibration Dec.] : diminished vibratory sensation at the level of the toes [Oriented To Time, Place, And Person] : oriented to person, place, and time [Impaired Insight] : insight and judgment were intact [Ankle Swelling (On Exam)] : not present [Varicose Veins Of Lower Extremities] : not present [] : not present [de-identified] : right  TMA, left partial hallux and 2nd toe amputation  [Foot Ulcer] : no foot ulcer [Skin Induration] : no skin induration [FreeTextEntry1] : hyperkeratotic skin right TMA stump. \par  Pre ulcerative lesion; healed   [Diminished Throughout Right Foot] : normal sensation with monofilament testing throughout right foot [Diminished Throughout Left Foot] : normal sensation with monofilament testing throughout left foot

## 2022-09-30 NOTE — HISTORY OF PRESENT ILLNESS
[FreeTextEntry1] : 72 year old M  presents for a diabetic foot evaluation.  Last A1c was 12.1%  8/2022. History of foot amputations. Denies changes to health since previous visit. Returns today for f/u. Denies f/c/n/v/sob.\par

## 2022-10-03 DIAGNOSIS — L85.1 ACQUIRED KERATOSIS [KERATODERMA] PALMARIS ET PLANTARIS: ICD-10-CM

## 2022-10-03 DIAGNOSIS — Z89.431 ACQUIRED ABSENCE OF RIGHT FOOT: ICD-10-CM

## 2022-10-03 DIAGNOSIS — E11.40 TYPE 2 DIABETES MELLITUS WITH DIABETIC NEUROPATHY, UNSPECIFIED: ICD-10-CM

## 2022-10-05 ENCOUNTER — NON-APPOINTMENT (OUTPATIENT)
Age: 72
End: 2022-10-05

## 2022-10-05 ENCOUNTER — APPOINTMENT (OUTPATIENT)
Dept: CARDIOLOGY | Facility: CLINIC | Age: 72
End: 2022-10-05

## 2022-10-05 PROCEDURE — 93298 REM INTERROG DEV EVAL SCRMS: CPT

## 2022-10-05 PROCEDURE — G2066: CPT

## 2022-10-14 ENCOUNTER — APPOINTMENT (OUTPATIENT)
Dept: GASTROENTEROLOGY | Facility: CLINIC | Age: 72
End: 2022-10-14

## 2022-11-02 ENCOUNTER — APPOINTMENT (OUTPATIENT)
Dept: ENDOCRINOLOGY | Facility: CLINIC | Age: 72
End: 2022-11-02

## 2022-11-08 ENCOUNTER — NON-APPOINTMENT (OUTPATIENT)
Age: 72
End: 2022-11-08

## 2022-11-08 ENCOUNTER — APPOINTMENT (OUTPATIENT)
Dept: CARDIOLOGY | Facility: CLINIC | Age: 72
End: 2022-11-08

## 2022-11-08 PROCEDURE — 93298 REM INTERROG DEV EVAL SCRMS: CPT

## 2022-11-08 PROCEDURE — G2066: CPT

## 2022-12-01 ENCOUNTER — APPOINTMENT (OUTPATIENT)
Dept: PODIATRY | Facility: CLINIC | Age: 72
End: 2022-12-01

## 2022-12-01 ENCOUNTER — OUTPATIENT (OUTPATIENT)
Dept: OUTPATIENT SERVICES | Facility: HOSPITAL | Age: 72
LOS: 1 days | Discharge: HOME | End: 2022-12-01

## 2022-12-01 DIAGNOSIS — R21 RASH AND OTHER NONSPECIFIC SKIN ERUPTION: ICD-10-CM

## 2022-12-01 DIAGNOSIS — Z89.429 ACQUIRED ABSENCE OF OTHER TOE(S), UNSPECIFIED SIDE: Chronic | ICD-10-CM

## 2022-12-01 DIAGNOSIS — Z89.421 ACQUIRED ABSENCE OF OTHER RIGHT TOE(S): Chronic | ICD-10-CM

## 2022-12-01 PROCEDURE — 99213 OFFICE O/P EST LOW 20 MIN: CPT | Mod: GC

## 2022-12-02 PROBLEM — R21 BLISTERING ERUPTION: Status: ACTIVE | Noted: 2019-01-04

## 2022-12-02 NOTE — HISTORY OF PRESENT ILLNESS
[Sneakers] : hosea [FreeTextEntry1] : CC: "Right foot discolored"\par HPI:\par -72 year old M presents to clinic for eval of R foot discoloration, noticed it a few days ago\par -Denies any recent trauma or change in activities\par -Reports recent change in shoegear; wearing sneakers instead of boots\par -Pain 4/10\par -Has not done any tx\par -Saw PCP who advised he see Podiatry\par -Last A1c was 12.1%  8/2022. History of foot amputations. \par -Denies changes to health since previous visit. Returns today for f/u. Denies f/c/n/v/sob.\par -No other pedal complaints\par \par \par

## 2022-12-02 NOTE — PHYSICAL EXAM
[General Appearance - Alert] : alert [General Appearance - In No Acute Distress] : in no acute distress [Ankle Swelling On The Right] : of the right ankle [Varicose Veins Of The Right Leg] : on the right foot/ankle [1+] : right foot dorsalis pedis 1+ [0] : left foot dorsalis pedis 0 [Vibration Dec.] : diminished vibratory sensation at the level of the toes [Oriented To Time, Place, And Person] : oriented to person, place, and time [Impaired Insight] : insight and judgment were intact [No Joint Swelling] : no joint swelling [Ankle Swelling (On Exam)] : not present [Varicose Veins Of Lower Extremities] : not present [] : not present [de-identified] : Right TMA,\par Left partial hallux and 2nd toe amputation  [Foot Ulcer] : no foot ulcer [Skin Induration] : no skin induration [FreeTextEntry1] : Callus to plantar medial right TMA stump\par Dried sanguinous blister to plantar lateral midfoot\par Dystrophic & fungal nails x2 [Diminished Throughout Right Foot] : normal sensation with monofilament testing throughout right foot [Diminished Throughout Left Foot] : normal sensation with monofilament testing throughout left foot

## 2022-12-02 NOTE — ASSESSMENT
[Verbal] : verbal [Patient] : patient [Good - alert, interested, motivated] : Good - alert, interested, motivated [Demonstrates independently] : demonstrates independently [FreeTextEntry1] : Modified ADA Diabetic Foot Risk Classification 3 \par \par -Loss of protective sensation: yes \par -Presence of foot deformity:  yes  \par -presence of pre-ulcerative lesion: yes \par   -hemorrhage into callus: no\par -atrophy of heel or metatarsal fat pads:  no\par \par -Diabetic neurovascular foot assessment  performed. \par -Discussed with patient diabetic foot hygiene.Patient instructed to regularly check the bottom of the feet\par -Patient given a diabetic foot education sheet\par \par \par Class A\par -non-traumatic amputation of foot/digit\par \par -Right plantar medial callus to TMA stump\par -Right plantarlateral midfoot Right TMA stump dried sanguinous bulla\par \par Plan:\par -Pt seen & evaluated\par -Aseptic sharp debridement of callus & dried sanguinous bulla w/dermal curette down to smooth epithelial layer\par -Pt states he had Vascular f/u 5 months ago; no note in EMR\par -Aseptic dbx of all dystrophic & fungal nails x 2\par -Pt will change back to previous shoegear\par -Pt advised to check BG & feet daily\par -RTC 3 Months

## 2022-12-02 NOTE — END OF VISIT
[] : Resident [FreeTextEntry3] : dried blood blister on the plantar surface of the right foot--> on debridement noted healthy underlying epithelial skin. \par submetatarsal head one preulcerative lesion-->excisional debridement performed down to skin level with 15 blade\par pt to return to wearing diabetic shoes with molded inserts (pt recently started wearing sneakers, which likely attributed to new foot wounds)\par nails debrided\par diabetic foot exam performed\par return 3 weeks\par \par Class A\par -non-traumatic amputation of foot/digit\par \par My notes supersedes the above resident documentation in case of discrepancy. Correction for their notes is in my notes. \par \par \par

## 2022-12-06 DIAGNOSIS — Z89.419 ACQUIRED ABSENCE OF UNSPECIFIED GREAT TOE: ICD-10-CM

## 2022-12-06 DIAGNOSIS — R21 RASH AND OTHER NONSPECIFIC SKIN ERUPTION: ICD-10-CM

## 2022-12-06 DIAGNOSIS — L97.519 NON-PRESSURE CHRONIC ULCER OF OTHER PART OF RIGHT FOOT WITH UNSPECIFIED SEVERITY: ICD-10-CM

## 2022-12-13 ENCOUNTER — APPOINTMENT (OUTPATIENT)
Dept: CARDIOLOGY | Facility: CLINIC | Age: 72
End: 2022-12-13

## 2022-12-13 ENCOUNTER — NON-APPOINTMENT (OUTPATIENT)
Age: 72
End: 2022-12-13

## 2022-12-13 PROCEDURE — 93298 REM INTERROG DEV EVAL SCRMS: CPT

## 2022-12-13 PROCEDURE — G2066: CPT

## 2022-12-21 ENCOUNTER — OUTPATIENT (OUTPATIENT)
Dept: OUTPATIENT SERVICES | Facility: HOSPITAL | Age: 72
LOS: 1 days | Discharge: HOME | End: 2022-12-21

## 2022-12-21 ENCOUNTER — APPOINTMENT (OUTPATIENT)
Dept: ENDOCRINOLOGY | Facility: CLINIC | Age: 72
End: 2022-12-21

## 2022-12-21 VITALS
SYSTOLIC BLOOD PRESSURE: 169 MMHG | BODY MASS INDEX: 26.5 KG/M2 | WEIGHT: 190 LBS | DIASTOLIC BLOOD PRESSURE: 84 MMHG | HEART RATE: 92 BPM

## 2022-12-21 DIAGNOSIS — Z89.421 ACQUIRED ABSENCE OF OTHER RIGHT TOE(S): Chronic | ICD-10-CM

## 2022-12-21 DIAGNOSIS — Z89.429 ACQUIRED ABSENCE OF OTHER TOE(S), UNSPECIFIED SIDE: Chronic | ICD-10-CM

## 2022-12-21 NOTE — ASSESSMENT
[FreeTextEntry1] : 70 y/o male with history of HTN, DL, DM2, bipolar disorder, osteomyelitis, s/p amputation, PVD, CVA, substance abuse, was in the hospital in August 2021 with an acute CVA in office today for routine follow up. He is scheduled for right eye cataract surgery next months.\par Last ophthalmologist visit was 2 months ago. Last Podiatry visit on 12/01/22.\par \par # DM\par - c/w home dose of Janumet  twice daily\par - start Jardiance 25 mg daily, add pioglitazone\par - increase trulicity to 3 mg q weekly\par - RTC in 6 months or PRN\par

## 2022-12-21 NOTE — HISTORY OF PRESENT ILLNESS
[FreeTextEntry1] : 72 y/o male with history of HTN, DL, DM2, bipolar disorder, osteomyelitis, s/p amputation, PVD, CVA, substance abuse, was in the hospital in August 2021 with an acute CVA in office today for routine follow up. He is scheduled for right eye cataract surgery next months.\par Last ophthalmologist visit was 2 months ago. Last Podiatry visit on 12/01/22.

## 2023-01-04 NOTE — PROGRESS NOTE ADULT - SUBJECTIVE AND OBJECTIVE BOX
JOSÉ MANUEL PORTER 68y Male  MRN#: [id number]       SUBJECTIVE  Patient is a 68y old Male who presents with a chief complaint of Left big toe skin tear (31 Dec 2018 12:31)  Currently admitted to medicine with the primary diagnosis of Foreign body (FB) in soft tissue.      OBJECTIVE  PAST MEDICAL & SURGICAL HISTORY  Dyslipidemia  HTN (hypertension)  DM (diabetes mellitus)  Toe amputation status, right: (2008)    ALLERGIES:  No Known Allergies    MEDICATIONS:  STANDING MEDICATIONS  atorvastatin 40 milliGRAM(s) Oral at bedtime  chlorhexidine 4% Liquid 1 Application(s) Topical <User Schedule>  dextrose 5%. 1000 milliLiter(s) IV Continuous <Continuous>  dextrose 50% Injectable 12.5 Gram(s) IV Push once  dextrose 50% Injectable 25 Gram(s) IV Push once  dextrose 50% Injectable 25 Gram(s) IV Push once  enoxaparin Injectable 40 milliGRAM(s) SubCutaneous daily  influenza   Vaccine 0.5 milliLiter(s) IntraMuscular once  insulin glargine Injectable (LANTUS) 45 Unit(s) SubCutaneous at bedtime  insulin lispro (HumaLOG) corrective regimen sliding scale   SubCutaneous three times a day before meals  insulin lispro Injectable (HumaLOG) 15 Unit(s) SubCutaneous three times a day before meals  lactulose Syrup 20 Gram(s) Oral once  lisinopril 40 milliGRAM(s) Oral daily  pantoprazole    Tablet 40 milliGRAM(s) Oral before breakfast  povidone iodine 10% Solution 1 Application(s) Topical <User Schedule>  sertraline 50 milliGRAM(s) Oral daily    PRN MEDICATIONS  dextrose 40% Gel 15 Gram(s) Oral once PRN  glucagon  Injectable 1 milliGRAM(s) IntraMuscular once PRN      VITAL SIGNS: Last 24 Hours  T(C): 36.2 (31 Dec 2018 12:22), Max: 36.9 (31 Dec 2018 06:52)  T(F): 97.1 (31 Dec 2018 12:22), Max: 98.5 (31 Dec 2018 06:52)  HR: 77 (31 Dec 2018 12:22) (67 - 77)  BP: 131/64 (31 Dec 2018 12:22) (131/64 - 142/67)  BP(mean): --  RR: 19 (31 Dec 2018 12:22) (18 - 19)  SpO2: --    LABS:                        11.1   6.48  )-----------( 180      ( 30 Dec 2018 09:05 )             33.8     12-30    140  |  100  |  15  ----------------------------<  169<H>  4.1   |  25  |  0.9    Ca    8.7      30 Dec 2018 09:05  Mg     1.8     12-30    TPro  5.5<L>  /  Alb  3.6  /  TBili  0.7  /  DBili  x   /  AST  13  /  ALT  15  /  AlkPhos  67  12-30        PHYSICAL EXAM:    GENERAL: NAD, well-developed, AAOx3  HEENT:  Atraumatic, Normocephalic. EOMI, PERRLA, conjunctiva and sclera clear, No JVD  PULMONARY: Clear to auscultation bilaterally; No wheeze  CARDIOVASCULAR: Regular rate and rhythm; No murmurs, rubs, or gallops  GASTROINTESTINAL: Soft, Nontender, Nondistended  MUSCULOSKELETAL:  2+ Peripheral Pulses, No clubbing, cyanosis, or edema  NEUROLOGY: non-focal  SKIN: No rashes or lesions [FreeTextEntry1] : F/u 3 weeks for BP check\par

## 2023-01-05 ENCOUNTER — OUTPATIENT (OUTPATIENT)
Dept: OUTPATIENT SERVICES | Facility: HOSPITAL | Age: 73
LOS: 1 days | Discharge: HOME | End: 2023-01-05

## 2023-01-05 ENCOUNTER — APPOINTMENT (OUTPATIENT)
Dept: PODIATRY | Facility: CLINIC | Age: 73
End: 2023-01-05
Payer: MEDICARE

## 2023-01-05 ENCOUNTER — APPOINTMENT (OUTPATIENT)
Dept: PODIATRY | Facility: CLINIC | Age: 73
End: 2023-01-05

## 2023-01-05 PROCEDURE — 11042 DBRDMT SUBQ TIS 1ST 20SQCM/<: CPT | Mod: LT

## 2023-01-05 NOTE — HISTORY OF PRESENT ILLNESS
[Sneakers] : hosea [FreeTextEntry1] : CC: "Right foot discolored"\par HPI:\par -72 year old M presents to clinic for eval of R foot discoloration, noticed it a few days ago\par -Denies any recent trauma or change in activities\par -Reports recent change in shoegear; wearing sneakers instead of boots\par -Pain 4/10\par -Has not done any tx\par -Saw PCP who advised he see Podiatry\par -Last A1c was 12.1%  8/2022. History of foot amputations. \par -Denies changes to health since previous visit. Returns today for f/u. Denies f/c/n/v/sob.\par -No other pedal complaints\par \par 1/5 returns today for wound care \par

## 2023-01-05 NOTE — ASSESSMENT
[Verbal] : verbal [Patient] : patient [Good - alert, interested, motivated] : Good - alert, interested, motivated [Demonstrates independently] : demonstrates independently [FreeTextEntry1] : -right foot ulcer  excisionally debrided of fibrotic/devitalized tissue down to subcutaneous layer. Performed under sterile conditions with curette and scalpel.\par -referred to see Jamshid for application of total contact cast

## 2023-01-05 NOTE — PHYSICAL EXAM
[General Appearance - Alert] : alert [General Appearance - In No Acute Distress] : in no acute distress [Ankle Swelling On The Right] : of the right ankle [Varicose Veins Of The Right Leg] : on the right foot/ankle [1+] : right foot dorsalis pedis 1+ [0] : left foot dorsalis pedis 0 [No Joint Swelling] : no joint swelling [Vibration Dec.] : diminished vibratory sensation at the level of the toes [Oriented To Time, Place, And Person] : oriented to person, place, and time [Impaired Insight] : insight and judgment were intact [Ankle Swelling (On Exam)] : not present [Varicose Veins Of Lower Extremities] : not present [] : not present [de-identified] : Right TMA,\par Left partial hallux and 2nd toe amputation  [Foot Ulcer] : no foot ulcer [Skin Induration] : no skin induration [FreeTextEntry1] : ulcer right plantar medial TMA stump. extends to subcutaneous tissue. measures 0.8cm x 0.8cm.  no acute signs of infection\par  [Diminished Throughout Right Foot] : normal sensation with monofilament testing throughout right foot [Diminished Throughout Left Foot] : normal sensation with monofilament testing throughout left foot

## 2023-01-09 ENCOUNTER — APPOINTMENT (OUTPATIENT)
Dept: CARDIOLOGY | Facility: CLINIC | Age: 73
End: 2023-01-09

## 2023-01-19 ENCOUNTER — OUTPATIENT (OUTPATIENT)
Dept: OUTPATIENT SERVICES | Facility: HOSPITAL | Age: 73
LOS: 1 days | Discharge: HOME | End: 2023-01-19

## 2023-01-19 ENCOUNTER — APPOINTMENT (OUTPATIENT)
Dept: PODIATRY | Facility: CLINIC | Age: 73
End: 2023-01-19
Payer: MEDICARE

## 2023-01-19 DIAGNOSIS — Z89.429 ACQUIRED ABSENCE OF OTHER TOE(S), UNSPECIFIED SIDE: Chronic | ICD-10-CM

## 2023-01-19 DIAGNOSIS — Z89.421 ACQUIRED ABSENCE OF OTHER RIGHT TOE(S): Chronic | ICD-10-CM

## 2023-01-19 DIAGNOSIS — I70.235 ATHEROSCLEROSIS OF NATIVE ARTERIES OF RIGHT LEG WITH ULCERATION OF OTHER PART OF FOOT: ICD-10-CM

## 2023-01-19 PROCEDURE — 97763 ORTHC/PROSTC MGMT SBSQ ENC: CPT | Mod: GP

## 2023-01-20 PROBLEM — I70.235 ATHEROSCLEROSIS OF NATIVE ARTERY OF RIGHT LOWER EXTREMITY WITH ULCERATION OF OTHER PART OF FOOT: Status: ACTIVE | Noted: 2019-01-10

## 2023-01-20 NOTE — HISTORY OF PRESENT ILLNESS
[Sneakers] : hosea [FreeTextEntry1] : 71 y/o male with PMH of right foot TMA. here today for wound care\par \par

## 2023-01-20 NOTE — PROCEDURE
[] : A mold was applied. [FreeTextEntry1] : wound cleaned and sharply debrided. wound 0.5 x 0.6 superficial. no signs of active infection. wound improved from last week. dressed with gentian violet, bacitracin,2x2,omnifix. orthotic fabricated for [facility] shoe. if no drainage pt will leave on till returns this coming monday, if needs will change dressing.

## 2023-01-20 NOTE — END OF VISIT
[FreeTextEntry3] : ulcer size improving. no acute signs of infection\par  [FreeTextEntry2] : fabrication  of inserts indicated to reduce weight to pressure baring regions\par

## 2023-01-23 ENCOUNTER — APPOINTMENT (OUTPATIENT)
Dept: PODIATRY | Facility: CLINIC | Age: 73
End: 2023-01-23
Payer: MEDICARE

## 2023-01-23 ENCOUNTER — OUTPATIENT (OUTPATIENT)
Dept: OUTPATIENT SERVICES | Facility: HOSPITAL | Age: 73
LOS: 1 days | Discharge: HOME | End: 2023-01-23

## 2023-01-23 DIAGNOSIS — Z89.429 ACQUIRED ABSENCE OF OTHER TOE(S), UNSPECIFIED SIDE: Chronic | ICD-10-CM

## 2023-01-23 DIAGNOSIS — Z89.421 ACQUIRED ABSENCE OF OTHER RIGHT TOE(S): Chronic | ICD-10-CM

## 2023-01-23 PROCEDURE — 99212 OFFICE O/P EST SF 10 MIN: CPT

## 2023-01-24 NOTE — PROCEDURE
[FreeTextEntry1] : dressing removed.right 1st met is healed. dressed with gentian violet to allow skin to mature. pt to return in approximately 1 week with shoes to evaluate inserts.

## 2023-01-24 NOTE — END OF VISIT
[] : Resident [FreeTextEntry3] : plantar ulcer, right foot has healed\par continue standard diabetic foot precautions

## 2023-01-24 NOTE — HISTORY OF PRESENT ILLNESS
[Sneakers] : hosea [FreeTextEntry1] : 71 y/o male with PMH of right foot TMA. here today for wound care\par \par 1/23 returns for wound care

## 2023-01-31 ENCOUNTER — APPOINTMENT (OUTPATIENT)
Dept: PODIATRY | Facility: CLINIC | Age: 73
End: 2023-01-31
Payer: MEDICARE

## 2023-01-31 ENCOUNTER — OUTPATIENT (OUTPATIENT)
Dept: OUTPATIENT SERVICES | Facility: HOSPITAL | Age: 73
LOS: 1 days | Discharge: HOME | End: 2023-01-31

## 2023-01-31 DIAGNOSIS — Z89.431 ACQUIRED ABSENCE OF RIGHT FOOT: ICD-10-CM

## 2023-01-31 DIAGNOSIS — Z86.73 PERSONAL HISTORY OF TRANSIENT ISCHEMIC ATTACK (TIA), AND CEREBRAL INFARCTION W/OUT RESIDUAL DEFICITS: ICD-10-CM

## 2023-01-31 DIAGNOSIS — Z98.890 OTHER SPECIFIED POSTPROCEDURAL STATES: ICD-10-CM

## 2023-01-31 DIAGNOSIS — Z89.429 ACQUIRED ABSENCE OF OTHER TOE(S), UNSPECIFIED SIDE: Chronic | ICD-10-CM

## 2023-01-31 DIAGNOSIS — L85.3 XEROSIS CUTIS: ICD-10-CM

## 2023-01-31 DIAGNOSIS — Z89.421 ACQUIRED ABSENCE OF OTHER RIGHT TOE(S): Chronic | ICD-10-CM

## 2023-01-31 PROCEDURE — 99212 OFFICE O/P EST SF 10 MIN: CPT

## 2023-02-03 DIAGNOSIS — E11.621 TYPE 2 DIABETES MELLITUS WITH FOOT ULCER: ICD-10-CM

## 2023-02-03 DIAGNOSIS — Z89.419 ACQUIRED ABSENCE OF UNSPECIFIED GREAT TOE: ICD-10-CM

## 2023-02-03 DIAGNOSIS — L97.519 NON-PRESSURE CHRONIC ULCER OF OTHER PART OF RIGHT FOOT WITH UNSPECIFIED SEVERITY: ICD-10-CM

## 2023-02-03 PROBLEM — Z86.73 HISTORY OF CVA (CEREBROVASCULAR ACCIDENT): Status: ACTIVE | Noted: 2021-09-22

## 2023-02-03 PROBLEM — Z98.890 S/P FOOT SURGERY, RIGHT: Status: ACTIVE | Noted: 2019-12-31

## 2023-02-03 PROBLEM — L85.3 DRY SKIN: Status: ACTIVE | Noted: 2017-01-09

## 2023-02-03 PROBLEM — Z89.431 STATUS POST AMPUTATION OF RIGHT FOOT THROUGH METATARSAL BONE: Status: ACTIVE | Noted: 2019-12-26

## 2023-02-03 NOTE — HISTORY OF PRESENT ILLNESS
[Sneakers] : hosea [FreeTextEntry1] : 74 y/o male with PMH of right foot TMA. here today for wound care\par \par 1/23 returns for wound care\par 1/31-returns for follow up care

## 2023-02-03 NOTE — END OF VISIT
[] : Resident [FreeTextEntry3] : recurrence of superficial ulceration -->light debridement performed w/ 15 blade\par off-loading padding/strapping applied \par return 1 week\par Class A\par -non-traumatic amputation of foot/digit\par

## 2023-02-03 NOTE — PROCEDURE
[] : Padding and strapping was applied to the patient's right foot to offload pressure bearing regions. [FreeTextEntry1] : feet cleaned, debrided of callosities. under callous of former wound to right distal tma found a superficial opening,0.8 x 0.3 cm. dressed with medi honey,2x2,omnifix. felt reliefs applied held in place with coban and omnifix. pt will return next week.

## 2023-02-06 ENCOUNTER — APPOINTMENT (OUTPATIENT)
Dept: PODIATRY | Facility: CLINIC | Age: 73
End: 2023-02-06
Payer: MEDICARE

## 2023-02-06 ENCOUNTER — OUTPATIENT (OUTPATIENT)
Dept: OUTPATIENT SERVICES | Facility: HOSPITAL | Age: 73
LOS: 1 days | End: 2023-02-06
Payer: MEDICARE

## 2023-02-06 ENCOUNTER — APPOINTMENT (OUTPATIENT)
Dept: PODIATRY | Facility: CLINIC | Age: 73
End: 2023-02-06

## 2023-02-06 DIAGNOSIS — Z00.00 ENCOUNTER FOR GENERAL ADULT MEDICAL EXAMINATION WITHOUT ABNORMAL FINDINGS: ICD-10-CM

## 2023-02-06 DIAGNOSIS — L97.529 NON-PRESSURE CHRONIC ULCER OF OTHER PART OF LEFT FOOT WITH UNSPECIFIED SEVERITY: ICD-10-CM

## 2023-02-06 DIAGNOSIS — Z89.429 ACQUIRED ABSENCE OF OTHER TOE(S), UNSPECIFIED SIDE: Chronic | ICD-10-CM

## 2023-02-06 DIAGNOSIS — Z89.421 ACQUIRED ABSENCE OF OTHER RIGHT TOE(S): Chronic | ICD-10-CM

## 2023-02-06 PROCEDURE — 99213 OFFICE O/P EST LOW 20 MIN: CPT

## 2023-02-07 PROBLEM — L97.529 FOOT ULCER, LEFT: Status: ACTIVE | Noted: 2021-02-09

## 2023-02-07 NOTE — HISTORY OF PRESENT ILLNESS
[Sneakers] : hosea [FreeTextEntry1] : 74 y/o male with PMH of right foot TMA. here today for wound care\par \par 1/23 returns for wound care\par 1/31-returns for follow up care \par 2/6 returns for continued care

## 2023-02-07 NOTE — END OF VISIT
[] : Resident [FreeTextEntry3] : no acute signs of infection\par hyperkeratosis tissue debrided with 15 blade\par off-loading padding applied\par Class A\par -non-traumatic amputation of foot/digit\par

## 2023-02-07 NOTE — PROCEDURE
[] : Padding and strapping was applied to the patient's left foot to offload pressure bearing regions. [FreeTextEntry1] : dressing removed,foot cleaned. mild debridement of callous. wound much improved,pinpoint opening remains. dressed with medi honey 2x2,omnifix. felt reliefs applied to off load wound, held in place wit coban and omnifix. pt to return next week.

## 2023-02-10 DIAGNOSIS — L97.529 NON-PRESSURE CHRONIC ULCER OF OTHER PART OF LEFT FOOT WITH UNSPECIFIED SEVERITY: ICD-10-CM

## 2023-02-10 DIAGNOSIS — E11.621 TYPE 2 DIABETES MELLITUS WITH FOOT ULCER: ICD-10-CM

## 2023-02-13 ENCOUNTER — NON-APPOINTMENT (OUTPATIENT)
Age: 73
End: 2023-02-13

## 2023-02-13 ENCOUNTER — APPOINTMENT (OUTPATIENT)
Dept: CARDIOLOGY | Facility: CLINIC | Age: 73
End: 2023-02-13
Payer: MEDICARE

## 2023-02-13 PROCEDURE — 93298 REM INTERROG DEV EVAL SCRMS: CPT

## 2023-02-13 PROCEDURE — G2066: CPT

## 2023-02-16 ENCOUNTER — OUTPATIENT (OUTPATIENT)
Dept: OUTPATIENT SERVICES | Facility: HOSPITAL | Age: 73
LOS: 1 days | End: 2023-02-16
Payer: MEDICARE

## 2023-02-16 ENCOUNTER — APPOINTMENT (OUTPATIENT)
Dept: PODIATRY | Facility: CLINIC | Age: 73
End: 2023-02-16
Payer: MEDICARE

## 2023-02-16 DIAGNOSIS — L03.116 CELLULITIS OF LEFT LOWER LIMB: ICD-10-CM

## 2023-02-16 DIAGNOSIS — Z89.429 ACQUIRED ABSENCE OF OTHER TOE(S), UNSPECIFIED SIDE: Chronic | ICD-10-CM

## 2023-02-16 DIAGNOSIS — Z89.421 ACQUIRED ABSENCE OF OTHER RIGHT TOE(S): Chronic | ICD-10-CM

## 2023-02-16 DIAGNOSIS — T14.8XXD OTHER INJURY OF UNSPECIFIED BODY REGION, SUBSEQUENT ENCOUNTER: ICD-10-CM

## 2023-02-16 DIAGNOSIS — I70.245 ATHEROSCLEROSIS OF NATIVE ARTERIES OF LEFT LEG WITH ULCERATION OF OTHER PART OF FOOT: ICD-10-CM

## 2023-02-16 DIAGNOSIS — M86.9 OSTEOMYELITIS, UNSPECIFIED: ICD-10-CM

## 2023-02-16 DIAGNOSIS — Z00.00 ENCOUNTER FOR GENERAL ADULT MEDICAL EXAMINATION WITHOUT ABNORMAL FINDINGS: ICD-10-CM

## 2023-02-16 PROCEDURE — 97763 ORTHC/PROSTC MGMT SBSQ ENC: CPT

## 2023-02-16 PROCEDURE — 97763 ORTHC/PROSTC MGMT SBSQ ENC: CPT | Mod: GP

## 2023-02-20 PROBLEM — I70.245 ATHEROSCLEROSIS OF NATIVE ARTERIES OF LEFT LEG WITH ULCERATION OF OTHER PART OF FOOT: Status: ACTIVE | Noted: 2020-10-12

## 2023-02-20 PROBLEM — T14.8XXD DELAYED WOUND HEALING: Status: ACTIVE | Noted: 2020-01-14

## 2023-02-20 PROBLEM — M86.9 TOE OSTEOMYELITIS, LEFT: Status: ACTIVE | Noted: 2021-03-04

## 2023-02-20 PROBLEM — L03.116 CELLULITIS OF FOOT, LEFT: Status: ACTIVE | Noted: 2019-08-19

## 2023-02-20 NOTE — PROCEDURE
[] : A mold was applied. [FreeTextEntry1] : dressing removed, wound site is healed. insert to sneakers modified to better off load former wound site. pt given darco shoes added pink plastizote to darco insert so pt can use same as slippers at home.pt will return here in 3 weeks,sooner if problem arises.

## 2023-02-20 NOTE — HISTORY OF PRESENT ILLNESS
[Sneakers] : hosea [FreeTextEntry1] : 74 y/o male with PMH of right foot TMA. here today for wound care\par \par 1/23 returns for wound care\par 1/31-returns for follow up care \par 2/6 returns for continued care \par 2/16 returns for follow up

## 2023-02-20 NOTE — END OF VISIT
[] : Resident [FreeTextEntry3] : modification of inserts indicated to reduce weight to pressure baring regions\par

## 2023-02-21 ENCOUNTER — APPOINTMENT (OUTPATIENT)
Dept: ELECTROPHYSIOLOGY | Facility: CLINIC | Age: 73
End: 2023-02-21

## 2023-02-21 ENCOUNTER — APPOINTMENT (OUTPATIENT)
Dept: ELECTROPHYSIOLOGY | Facility: CLINIC | Age: 73
End: 2023-02-21
Payer: MEDICARE

## 2023-02-21 VITALS
DIASTOLIC BLOOD PRESSURE: 83 MMHG | SYSTOLIC BLOOD PRESSURE: 180 MMHG | TEMPERATURE: 97 F | BODY MASS INDEX: 26.6 KG/M2 | RESPIRATION RATE: 14 BRPM | HEIGHT: 71 IN | WEIGHT: 190 LBS | HEART RATE: 79 BPM

## 2023-02-21 VITALS — SYSTOLIC BLOOD PRESSURE: 182 MMHG | DIASTOLIC BLOOD PRESSURE: 86 MMHG

## 2023-02-21 VITALS — DIASTOLIC BLOOD PRESSURE: 90 MMHG | SYSTOLIC BLOOD PRESSURE: 160 MMHG

## 2023-02-21 DIAGNOSIS — Z86.73 PERSONAL HISTORY OF TRANSIENT ISCHEMIC ATTACK (TIA), AND CEREBRAL INFARCTION W/OUT RESIDUAL DEFICITS: ICD-10-CM

## 2023-02-21 PROCEDURE — 93000 ELECTROCARDIOGRAM COMPLETE: CPT | Mod: 59

## 2023-02-21 PROCEDURE — 93291 INTERROG DEV EVAL SCRMS IP: CPT

## 2023-02-21 NOTE — PROCEDURE
[See Device Printout] : See device printout [de-identified] : GLORIA [de-identified] : [id number] [de-identified] : [id number] [de-identified] : 9/8/2021 [de-identified] : Good [de-identified] : no events

## 2023-02-21 NOTE — HISTORY OF PRESENT ILLNESS
[Palpitations] : no palpitations [SOB] : no dyspnea [Syncope] : no syncope [Dizziness] : no dizziness [Chest Pain] : no chest pain or discomfort [Pain at Site] : no pain at device site [Erythema at Site] : no erythema at device site [Swelling at Site] : no swelling at device site [de-identified] : PCP: Dr. Shelton\par Cardiologist: Dr. Albarado\par \par The patient underwent placement of ILR on 9/8/2021 for cryptogenic CVA. He has no cardiac complaints.\par PMH includes HTN, HLD, DM

## 2023-02-21 NOTE — DISCUSSION/SUMMARY
[FreeTextEntry1] : Mr. Tariq Ceja is a pleasant 73-year-old man with hypertension, hyperlipidemia, diabetes mellitus, depression, GERD, admitted to [facility] in September 2021 for cryptogenic stroke. I recommend an ILR implant for this patient for long term detection of arrhythmias as a cause of his symptoms. Patient underwent implant of ILR on 9/8/2021.\par \par \par His BP is elevated, manual recheck /90. Patient is not clear what medicine he is on. Report lisinopril was discontinued and was put on a new medication for BP. He will get an appointment with PCP Dr. Shelton . I also asked patient to call me back to reconcile his meds. He denies s/s hypertension. I asked him \par avoid using salt in his meals and to avoid process food. \par The patient will return to our office in 9 months. If you have any questions please contact our office at [phone].

## 2023-02-21 NOTE — CARDIOLOGY SUMMARY
[de-identified] : 2/21/2023 79bpm sinus rhythm\par 4/18/22: SR 69 bpm, non-spec T wave abnormality\par ECG (10/11/2021): Sinus rhythm at 86 bpm [de-identified] : 2D ECHO (9/7/2021): EF normal, mildly enlarged LA, normal RA

## 2023-02-22 DIAGNOSIS — Z46.89 ENCOUNTER FOR FITTING AND ADJUSTMENT OF OTHER SPECIFIED DEVICES: ICD-10-CM

## 2023-02-22 DIAGNOSIS — E11.40 TYPE 2 DIABETES MELLITUS WITH DIABETIC NEUROPATHY, UNSPECIFIED: ICD-10-CM

## 2023-02-23 NOTE — ED PROVIDER NOTE - PMH
Depression    Diabetic foot ulcer    DM (diabetes mellitus)  12/27/18 hgb aic  11.2  Dyslipidemia    GERD (gastroesophageal reflux disease)    HTN (hypertension) n/a

## 2023-03-09 ENCOUNTER — OUTPATIENT (OUTPATIENT)
Dept: OUTPATIENT SERVICES | Facility: HOSPITAL | Age: 73
LOS: 1 days | End: 2023-03-09
Payer: MEDICARE

## 2023-03-09 ENCOUNTER — APPOINTMENT (OUTPATIENT)
Dept: PODIATRY | Facility: CLINIC | Age: 73
End: 2023-03-09
Payer: MEDICARE

## 2023-03-09 DIAGNOSIS — Z89.421 ACQUIRED ABSENCE OF OTHER RIGHT TOE(S): Chronic | ICD-10-CM

## 2023-03-09 DIAGNOSIS — Z00.00 ENCOUNTER FOR GENERAL ADULT MEDICAL EXAMINATION WITHOUT ABNORMAL FINDINGS: ICD-10-CM

## 2023-03-09 DIAGNOSIS — Z89.429 ACQUIRED ABSENCE OF OTHER TOE(S), UNSPECIFIED SIDE: Chronic | ICD-10-CM

## 2023-03-09 PROCEDURE — 99213 OFFICE O/P EST LOW 20 MIN: CPT | Mod: 50

## 2023-03-09 PROCEDURE — 99213 OFFICE O/P EST LOW 20 MIN: CPT

## 2023-03-11 NOTE — PROCEDURE
[FreeTextEntry1] : callouses debrided bilateral feet. pre ulcer site to right forefoot. pt advised to use shoes with orthotics at all times. feet moisturized with a and d ointment. pt will return in 2 weeks.

## 2023-03-11 NOTE — HISTORY OF PRESENT ILLNESS
[Sneakers] : hosea [FreeTextEntry1] : 74 y/o male with PMH of right foot TMA. here today for wound care\par \par

## 2023-03-11 NOTE — END OF VISIT
[] : Resident [FreeTextEntry3] : preulcerative lesion w/ hemorrhaging into the callus, right plantar TMA stump\par pt not compliant with wearing diabetic shoes and custom inserts.\par reinforced importance off offloading w/ custom inserts\par preulcerative lesion debrided\par discussed possible TCC if no improvement

## 2023-03-11 NOTE — PHYSICAL EXAM
[Foot Ulcer] : foot ulcer [Vibration Dec.] : diminished vibratory sensation at the level of the toes [FreeTextEntry1] : pre ulcer to right forefoot [Diminished Throughout Right Foot] : normal sensation with monofilament testing throughout right foot [Diminished Throughout Left Foot] : normal sensation with monofilament testing throughout left foot

## 2023-03-14 DIAGNOSIS — Z89.431 ACQUIRED ABSENCE OF RIGHT FOOT: ICD-10-CM

## 2023-03-14 DIAGNOSIS — L97.519 NON-PRESSURE CHRONIC ULCER OF OTHER PART OF RIGHT FOOT WITH UNSPECIFIED SEVERITY: ICD-10-CM

## 2023-03-23 ENCOUNTER — OUTPATIENT (OUTPATIENT)
Dept: OUTPATIENT SERVICES | Facility: HOSPITAL | Age: 73
LOS: 1 days | End: 2023-03-23
Payer: MEDICARE

## 2023-03-23 ENCOUNTER — APPOINTMENT (OUTPATIENT)
Dept: PODIATRY | Facility: CLINIC | Age: 73
End: 2023-03-23
Payer: MEDICARE

## 2023-03-23 DIAGNOSIS — Z89.421 ACQUIRED ABSENCE OF OTHER RIGHT TOE(S): Chronic | ICD-10-CM

## 2023-03-23 DIAGNOSIS — Z89.429 ACQUIRED ABSENCE OF OTHER TOE(S), UNSPECIFIED SIDE: Chronic | ICD-10-CM

## 2023-03-23 DIAGNOSIS — Z00.00 ENCOUNTER FOR GENERAL ADULT MEDICAL EXAMINATION WITHOUT ABNORMAL FINDINGS: ICD-10-CM

## 2023-03-23 PROCEDURE — 97763 ORTHC/PROSTC MGMT SBSQ ENC: CPT | Mod: RT,GP

## 2023-03-23 PROCEDURE — 97763 ORTHC/PROSTC MGMT SBSQ ENC: CPT | Mod: GP

## 2023-03-28 ENCOUNTER — NON-APPOINTMENT (OUTPATIENT)
Age: 73
End: 2023-03-28

## 2023-03-28 ENCOUNTER — APPOINTMENT (OUTPATIENT)
Dept: CARDIOLOGY | Facility: CLINIC | Age: 73
End: 2023-03-28
Payer: MEDICARE

## 2023-03-28 PROCEDURE — 93298 REM INTERROG DEV EVAL SCRMS: CPT

## 2023-03-28 PROCEDURE — G2066: CPT

## 2023-03-29 NOTE — HISTORY OF PRESENT ILLNESS
[Sneakers] : hosea [FreeTextEntry1] : 72 y/o male with PMH of right foot TMA. here today for wound care\par \par

## 2023-03-29 NOTE — PROCEDURE
[FreeTextEntry1] : foot cleaned. debridement of callous to wound site. superficial pinpoint opening dressed with gentian violet,bacitracin,2x2,omnifix. if any drainage pt will change dressing using bacitracin,2x2,omnifix. orthotic modified added ppt ti better cushion 1st met to right foot. pt to return in 2 weeks.

## 2023-03-30 DIAGNOSIS — Z46.89 ENCOUNTER FOR FITTING AND ADJUSTMENT OF OTHER SPECIFIED DEVICES: ICD-10-CM

## 2023-03-30 NOTE — ED ADULT NURSE NOTE - NS ED NURSE PATIENT LEFT UNIT TIME
Internal Medicine H&P      Reason for Admission/CC:   Chief Complaint   Patient presents with   • Chest Pain Adult   Palpitations [R00.2]    HPI: Kayden Villegas is a 57 year old male here for evaluation of palpitations. Last year he got the COVID-vaccine and reports he had an allergic reaction to it per previously documentation. In the last few months the palpitations have felt like a pounding in his chest, walking him up from sleep. He also experiences exertional dyspnea when he climbs stairs, but has no difficulty walking to the train station in the morning. He went to the ED 12/2022 for these palpitations and EKG showed sinus tachycardia. He was referred to cardiology and recently saw Dr. Wright in clinic, who recommended a Holter monitor and a stress echo. He is awaiting the results from his holter monitor but missed his appointment for the stress echo. He denies chest pain, lower extremity edema, orthopnea, recent URI symptoms, sick contacts, travel.    ED Course  Vitals: afebrile, , /93, SpO2 99% on RA  Labs: K 3.3, Cr. 0.87, trop 7, wbc 6.8, hgb 14.8, ddimer 0.22  CXR: no acute process  EKG: , sinus tachycardia, no ischemic changes or heart blocks  Intervention: kcl, admitted for further cardiac w/u    Review of Systems:  Review of Systems   Constitutional: Negative.    HENT: Negative.    Eyes: Negative.    Respiratory: Positive for chest tightness and shortness of breath.    Cardiovascular: Positive for palpitations. Negative for chest pain and leg swelling.   Gastrointestinal: Negative.    Endocrine: Negative.    Genitourinary: Negative.    Musculoskeletal: Negative.    Skin: Negative.    Allergic/Immunologic: Negative.    Hematological: Negative.    Psychiatric/Behavioral: Negative.        PMH:  Past Medical History:   Diagnosis Date   • Gastroesophageal reflux disease      Past Surgical History:   Procedure Laterality Date   • Cholecystectomy     • Hand surgery         Prior to Admission  Medications:  (Not in a hospital admission)      Active Medications:  Current Facility-Administered Medications   Medication Dose Route Frequency Provider Last Rate Last Admin   • potassium CHLORIDE (KLOR-CON M) richard ER tablet 40 mEq  40 mEq Oral Once Anis Luly         Current Outpatient Medications   Medication Sig Dispense Refill   • VITAMIN D, CHOLECALCIFEROL, PO      • Dexlansoprazole (Dexilant) 60 MG capsule Take 60 mg by mouth daily.         ALLERGIES:   Allergen Reactions   • Ciprofloxacin ANAPHYLAXIS   • Penicillins ANAPHYLAXIS       Social History:  He reports that he has never smoked. He has never used smokeless tobacco. He reports that he does not currently use alcohol. He reports that he does not currently use drugs. He has no history on file for sexual activity.    Family History:  His family history is not on file.    Vitals:    Vital Last Value 24 Hour Range   Temperature 97.9 °F (36.6 °C) (03/30/23 1457) Temp  Min: 97.9 °F (36.6 °C)  Max: 97.9 °F (36.6 °C)   Pulse (!) 110 (03/30/23 1514) Pulse  Min: 110  Max: 121   Respiratory 18 (03/30/23 1514) Resp  Min: 16  Max: 18   Non-Invasive  Blood Pressure (!) 153/93 (03/30/23 1457) BP  Min: 153/93  Max: 153/93   Pulse Oximetry 98 % (03/30/23 1514) SpO2  Min: 98 %  Max: 99 %   Arterial   Blood Pressure   No data recorded        Physical Exam:  General: in no acute distress, anxious appearing  Eyes: PERRL, EOMI, anicteric sclera  Neck: supple  Ears, nose, throat: moist oral mucosa  Lungs: CTA b/l, non-labored respirations, no wheezing  Cardiac: tachycardic, regular rhythm, no m/r/g  Abdomen: soft, non-tender, non-distended, normoactive bowel sounds, no rebound/guarding/rigidity  Musculoskeletal: Full ROM  Vascular: no edema, palpable pulses  Neurologic: AOx4, no focal motor/sensory deficits, no facial droop, no slurred speech  Psychiatric: cooperative    Diagnostic data:  Recent Results (from the past 24 hour(s))   Electrocardiogram 12-Lead    Collection  Time: 03/30/23  2:59 PM   Result Value Ref Range    Ventricular Rate EKG/Min (BPM) 117     Atrial Rate (BPM) 117     OK-Interval (MSEC) 146     QRS-Interval (MSEC) 82     QT-Interval (MSEC) 324     QTc 452     P Axis (Degrees) 29     R Axis (Degrees) 41     T Axis (Degrees) 13     REPORT TEXT       Sinus tachycardia  Inferior infarct  , age undetermined  Possible  Anterior infarct  (cited on or before  06-DEC-2022)  Abnormal ECG  When compared with ECG of  06-DEC-2022 20:15,  Inferior infarct  is now  present  Inverted T waves have replaced nonspecific T wave abnormality in  Inferior leads  Confirmed by IGNACIO LANDAVERDE MD ([id number]) on 3/30/2023 4:38:39 PM     Comprehensive Metabolic Panel    Collection Time: 03/30/23  3:19 PM   Result Value Ref Range    Fasting Status      Sodium 137 135 - 145 mmol/L    Potassium 3.3 (L) 3.4 - 5.1 mmol/L    Chloride 101 97 - 110 mmol/L    Carbon Dioxide 23 21 - 32 mmol/L    Anion Gap 16 7 - 19 mmol/L    Glucose 95 70 - 99 mg/dL    BUN 8 6 - 20 mg/dL    Creatinine 0.87 0.67 - 1.17 mg/dL    Glomerular Filtration Rate >90 >=60    BUN/Cr 9 7 - 25    Calcium 9.4 8.4 - 10.2 mg/dL    Bilirubin, Total 0.5 0.2 - 1.0 mg/dL    GOT/AST 31 <=37 Units/L    GPT/ALT 34 <64 Units/L    Alkaline Phosphatase 96 45 - 117 Units/L    Albumin 4.5 3.6 - 5.1 g/dL    Protein, Total 8.1 6.4 - 8.2 g/dL    Globulin 3.6 2.0 - 4.0 g/dL    A/G Ratio 1.3 1.0 - 2.4   TROPONIN I, HIGH SENSITIVITY    Collection Time: 03/30/23  3:19 PM   Result Value Ref Range    Troponin I, High Sensitivity 7 <77 ng/L   CBC with Automated Differential (performable only)    Collection Time: 03/30/23  3:19 PM   Result Value Ref Range    WBC 6.8 4.2 - 11.0 K/mcL    RBC 5.37 4.50 - 5.90 mil/mcL    HGB 14.8 13.0 - 17.0 g/dL    HCT 43.5 39.0 - 51.0 %    MCV 81.0 78.0 - 100.0 fl    MCH 27.6 26.0 - 34.0 pg    MCHC 34.0 32.0 - 36.5 g/dL    RDW-CV 12.9 11.0 - 15.0 %    RDW-SD 37.5 (L) 39.0 - 50.0 fL     140 - 450 K/mcL    NRBC 0 <=0 /100 WBC     Neutrophil, Percent 64 %    Lymphocytes, Percent 28 %    Mono, Percent 7 %    Eosinophils, Percent 1 %    Basophils, Percent 0 %    Immature Granulocytes 0 %    Absolute Neutrophils 4.3 1.8 - 7.7 K/mcL    Absolute Lymphocytes 1.9 1.0 - 4.0 K/mcL    Absolute Monocytes 0.5 0.3 - 0.9 K/mcL    Absolute Eosinophils  0.0 0.0 - 0.5 K/mcL    Absolute Basophils 0.0 0.0 - 0.3 K/mcL    Absolute Immature Granulocytes 0.0 0.0 - 0.2 K/mcL   D Dimer, Quantitative    Collection Time: 03/30/23  3:19 PM   Result Value Ref Range    D Dimer, Quantitative 0.22 <0.57 mg/L (FEU)       ?  Studies:  XR CHEST PA AND LATERAL 2 VIEWS    Result Date: 3/30/2023  Examination: Chest radiographs, 2 views. Clinical Information: Chest pain, palpitations, hypertension Comparison: Chest x-ray 12/06/2022 Findings: Support tubes and lines: None. Heart, mediastinum, and pulmonary vasculature: The cardiomediastinal silhouette is within normal limits for size and contour. No pulmonary vascular congestion. Lungs and pleura: No pulmonary consolidation or edema. No pleural effusion. No pneumothorax. Bones: No acute abnormality. Other findings: Cholecystectomy clips in the right upper quadrant.     Impression:  No acute cardiopulmonary abnormality. Electronically Signed by: CHRISTIANO KUMAR M.D. Signed on: 3/30/2023 3:57 PM Workstation ID: [id number]      Cardiac studies:   Encounter Date: 03/30/23   Electrocardiogram 12-Lead   Result Value    Ventricular Rate EKG/Min (BPM) 117    Atrial Rate (BPM) 117    OK-Interval (MSEC) 146    QRS-Interval (MSEC) 82    QT-Interval (MSEC) 324    QTc 452    P Axis (Degrees) 29    R Axis (Degrees) 41    T Axis (Degrees) 13    REPORT TEXT      Sinus tachycardia  Inferior infarct  , age undetermined  Possible  Anterior infarct  (cited on or before  06-DEC-2022)  Abnormal ECG  When compared with ECG of  06-DEC-2022 20:15,  Inferior infarct  is now  present  Inverted T waves have replaced nonspecific T wave abnormality  in  Inferior leads  Confirmed by IGNACIO LANDAVERDE MD ([id number]) on 3/30/2023 4:38:39 PM           ASSESSMENT/PLAN:  Kayden Villegas is a 57 year old male admitted for Palpitations [R00.2]    #Sinus tachycardia  #Palpitations  - Presents with palpitations progressively worsening since last year after receiving the COVID vaccine  - , sinus tachycardia  - Labs: trop negative x1, ddimer negative  - EKG: sinus tachycardia, similar to prior from 12/2022  - Follows with cardiology outpatient  - F/u utox  - F/u TSH  - F/u repeat trop  - F/u stress echo, npo at [facility]    #Hypokalemia  - K 3.3, repleted  - F/u mag      Fluids: LR  Electrolytes: repleted prn  Nutrition: cardiac, NPO at [facility]  VTE/GI Ppx: lovenox        Code Status: Not on file    DISPO: tele    D/w: Attending Physician Dr. Lo.  Awilda Stone MD  Internal Medicine  PGY-3   21:11

## 2023-04-06 ENCOUNTER — APPOINTMENT (OUTPATIENT)
Dept: PODIATRY | Facility: CLINIC | Age: 73
End: 2023-04-06
Payer: MEDICARE

## 2023-04-06 ENCOUNTER — OUTPATIENT (OUTPATIENT)
Dept: OUTPATIENT SERVICES | Facility: HOSPITAL | Age: 73
LOS: 1 days | End: 2023-04-06
Payer: MEDICARE

## 2023-04-06 DIAGNOSIS — Z89.421 ACQUIRED ABSENCE OF OTHER RIGHT TOE(S): Chronic | ICD-10-CM

## 2023-04-06 DIAGNOSIS — Z89.429 ACQUIRED ABSENCE OF OTHER TOE(S), UNSPECIFIED SIDE: Chronic | ICD-10-CM

## 2023-04-06 DIAGNOSIS — Z46.89 ENCOUNTER FOR FITTING AND ADJUSTMENT OF OTHER SPECIFIED DEVICES: ICD-10-CM

## 2023-04-06 DIAGNOSIS — Z00.00 ENCOUNTER FOR GENERAL ADULT MEDICAL EXAMINATION WITHOUT ABNORMAL FINDINGS: ICD-10-CM

## 2023-04-06 PROCEDURE — 97763 ORTHC/PROSTC MGMT SBSQ ENC: CPT | Mod: GP

## 2023-04-06 PROCEDURE — 97763 ORTHC/PROSTC MGMT SBSQ ENC: CPT | Mod: RT,GP

## 2023-04-06 PROCEDURE — 97763 ORTHC/PROSTC MGMT SBSQ ENC: CPT

## 2023-04-07 PROBLEM — Z46.89 ENCOUNTER FOR FITTING AND ADJUSTMENT OF OTHER SPECIFIED DEVICES: Status: ACTIVE | Noted: 2023-01-20

## 2023-04-07 NOTE — HISTORY OF PRESENT ILLNESS
[Sneakers] : hosea [FreeTextEntry1] : 72 y/o male with PMH of right foot TMA. here today for fabrication of inserts \par \par

## 2023-04-07 NOTE — PROCEDURE
[] : A mold was applied. [FreeTextEntry1] : callous trimmed to right distal tma. wound site fully healed. foot moisturized with a and d ointment. added ppt to orthotic distal aspect . pt doing well. will return in 3 weeks with new shoes for inspection.

## 2023-04-10 DIAGNOSIS — Z46.89 ENCOUNTER FOR FITTING AND ADJUSTMENT OF OTHER SPECIFIED DEVICES: ICD-10-CM

## 2023-04-26 NOTE — CONSULT NOTE ADULT - CONSULT REQUESTED DATE/TIME
Occupational Therapy   Treatment    Name: Zachariah Pike  MRN: [id number]  Admitting Diagnosis:  Pneumonia of left upper lobe due to infectious organism       Recommendations:     Discharge Recommendations: nursing facility, skilled  Discharge Equipment Recommendations:  bedside commode, shower chair, wheelchair  Barriers to discharge:  Decreased caregiver support    Assessment:     Zachariah Pike is a 73 y.o. male with a medical diagnosis of Pneumonia of left upper lobe due to infectious organism.  Performance deficits affecting function are weakness, impaired endurance, impaired self care skills, impaired functional mobility, gait instability, impaired balance, decreased coordination, decreased upper extremity function, decreased lower extremity function, impaired coordination, impaired fine motor, impaired cardiopulmonary response to activity.     Rehab Prognosis:  Good; patient would benefit from acute skilled OT services to address these deficits and reach maximum level of function.       Plan:     Patient to be seen 5 x/week to address the above listed problems via self-care/home management, therapeutic activities, therapeutic exercises, wheelchair management/training, neuromuscular re-education  Plan of Care Expires: 05/17/23  Plan of Care Reviewed with: patient, spouse    Subjective     Chief Complaint: none stated  Patient/Family Comments/goals: none stated  Pain/Comfort:  Pain Rating 1: 0/10  Pain Rating Post-Intervention 1: 0/10    Objective:     Communicated with: nurse prior to session.  Patient found HOB elevated with telemetry, Tracheostomy, PEG upon OT entry to room.    General Precautions: Standard, fall, droplet, contact    Orthopedic Precautions:N/A  Braces: N/A     Occupational Performance:     Activities of Daily Living:  Feeding:  set-up assistance for opening packaging; moderate cues to recall/follow aspiration precautions posted at bedside by ST; wife present/also encouraging pt to  04-Dec-2019 11:46 follow precautions; HOB fully elevated prior to intake and left elevated after completion      Patient left HOB elevated with all lines intact, call button in reach, bed alarm on, and spouse present    GOALS:   Multidisciplinary Problems       Occupational Therapy Goals          Problem: Occupational Therapy    Goal Priority Disciplines Outcome Interventions   Occupational Therapy Goal     OT, PT/OT     Description: Goals to be met by: 5/17/23     Patient will increase functional independence with ADLs by performing:    Feeding with Supervision and minimal cues for following aspiration precautions.    UE Dressing with Moderate Assistance.  LE Dressing with Moderate Assistance and Assistive Devices as needed.  Grooming while seated with Supervision.  Toileting from bedside commode with Moderate Assistance for hygiene and clothing management.   Toilet transfer to bedside commode with Moderate Assistance.                         Time Tracking:     OT Date of Treatment: 04/26/23  OT Start Time: 1208  OT Stop Time: 1250  OT Total Time (min): 42 min    Billable Minutes:Self Care/Home Management 42    OT/SHANTEL: OT          4/26/2023

## 2023-04-27 ENCOUNTER — APPOINTMENT (OUTPATIENT)
Dept: PODIATRY | Facility: CLINIC | Age: 73
End: 2023-04-27

## 2023-05-02 ENCOUNTER — APPOINTMENT (OUTPATIENT)
Dept: CARDIOLOGY | Facility: CLINIC | Age: 73
End: 2023-05-02
Payer: MEDICARE

## 2023-05-02 ENCOUNTER — NON-APPOINTMENT (OUTPATIENT)
Age: 73
End: 2023-05-02

## 2023-05-02 PROCEDURE — G2066: CPT

## 2023-05-02 PROCEDURE — 93298 REM INTERROG DEV EVAL SCRMS: CPT

## 2023-05-15 ENCOUNTER — APPOINTMENT (OUTPATIENT)
Dept: PODIATRY | Facility: CLINIC | Age: 73
End: 2023-05-15
Payer: MEDICARE

## 2023-05-15 ENCOUNTER — OUTPATIENT (OUTPATIENT)
Dept: OUTPATIENT SERVICES | Facility: HOSPITAL | Age: 73
LOS: 1 days | End: 2023-05-15
Payer: MEDICARE

## 2023-05-15 DIAGNOSIS — Z00.00 ENCOUNTER FOR GENERAL ADULT MEDICAL EXAMINATION WITHOUT ABNORMAL FINDINGS: ICD-10-CM

## 2023-05-15 DIAGNOSIS — L97.519 NON-PRESSURE CHRONIC ULCER OF OTHER PART OF RIGHT FOOT WITH UNSPECIFIED SEVERITY: ICD-10-CM

## 2023-05-15 DIAGNOSIS — Z89.421 ACQUIRED ABSENCE OF OTHER RIGHT TOE(S): Chronic | ICD-10-CM

## 2023-05-15 PROCEDURE — 11055 PARING/CUTG B9 HYPRKER LES 1: CPT | Mod: GC

## 2023-05-15 PROCEDURE — 99213 OFFICE O/P EST LOW 20 MIN: CPT | Mod: GC,25

## 2023-05-15 NOTE — PHYSICAL EXAM
[General Appearance - Alert] : alert [General Appearance - In No Acute Distress] : in no acute distress [General Appearance - Well Nourished] : well nourished [General Appearance - Well Developed] : well developed [Ankle Swelling On The Right] : of the right ankle [Varicose Veins Of The Right Leg] : on the right foot/ankle [1+] : right foot dorsalis pedis 1+ [No Joint Swelling] : no joint swelling [Skin Color & Pigmentation] : normal skin color and pigmentation [Vibration Dec.] : diminished vibratory sensation at the level of the toes [Diminished Throughout Right Foot] : diminished sensation with monofilament testing throughout right foot [Diminished Throughout Left Foot] : diminished sensation with monofilament testing throughout left foot [Oriented To Time, Place, And Person] : oriented to person, place, and time [Impaired Insight] : insight and judgment were intact [Affect] : the affect was normal [Mood] : the mood was normal [Ankle Swelling (On Exam)] : not present [Varicose Veins Of Lower Extremities] : not present [] : not present [Delayed in the Right Toes] : capillary refills normal in right toes [de-identified] : R foot TMA\par No pain w/passive/active ankle ROM, b/l\par No TTP b/l feet [Foot Ulcer] : no foot ulcer [Skin Induration] : no skin induration [FreeTextEntry1] : right submet head 1 ulcer measuring 2cm x 2cm. Noted necrotic tissue base, + malodor and drainage.

## 2023-05-15 NOTE — ASSESSMENT
[Verbal] : verbal [Patient] : patient [Good - alert, interested, motivated] : Good - alert, interested, motivated [Demonstrates independently] : demonstrates independently [FreeTextEntry1] : Assessment:\par -Callus plantarmedial TMA site, Right\par \par Plan:\par -Pt seen & evaluated\par -Aseptic dbx of R foot callus debrided; underlying ulcer probes to bone; wound base fibrotic/necrotic & malodorous\par -Wound Cx obtained\par -Pt will come to [facility] ED tomorrow, 5/16/23 for admission & surgical dbx later this week

## 2023-05-15 NOTE — END OF VISIT
[Resident] : Resident [] : Resident [FreeTextEntry3] : cultures taken\par rx augmentin\par patient instructed go ED. Would need surgical debridement (pt states he will go tomorrow) [FreeTextEntry2] : -right foot ulcer  excisionally debrided of fibrotic/devitalized tissue down to subcutaneous layer. Performed under sterile conditions with curette and scalpel.\par \par \par

## 2023-05-15 NOTE — HISTORY OF PRESENT ILLNESS
[Sneakers] : hosea [Cane] : a cane [FreeTextEntry1] : 74 y/o male presents with c/c of odorous discharge from the right foot ulcer

## 2023-05-16 ENCOUNTER — INPATIENT (INPATIENT)
Facility: HOSPITAL | Age: 73
LOS: 6 days | Discharge: HOME HEALTH | DRG: 464 | End: 2023-05-23
Attending: STUDENT IN AN ORGANIZED HEALTH CARE EDUCATION/TRAINING PROGRAM | Admitting: STUDENT IN AN ORGANIZED HEALTH CARE EDUCATION/TRAINING PROGRAM
Payer: MEDICARE

## 2023-05-16 VITALS
RESPIRATION RATE: 16 BRPM | SYSTOLIC BLOOD PRESSURE: 165 MMHG | HEART RATE: 88 BPM | OXYGEN SATURATION: 99 % | DIASTOLIC BLOOD PRESSURE: 77 MMHG | WEIGHT: 205.91 LBS | HEIGHT: 71 IN | TEMPERATURE: 99 F

## 2023-05-16 DIAGNOSIS — T87.53 NECROSIS OF AMPUTATION STUMP, RIGHT LOWER EXTREMITY: ICD-10-CM

## 2023-05-16 DIAGNOSIS — K21.9 GASTRO-ESOPHAGEAL REFLUX DISEASE WITHOUT ESOPHAGITIS: ICD-10-CM

## 2023-05-16 DIAGNOSIS — Z18.89 OTHER SPECIFIED RETAINED FOREIGN BODY FRAGMENTS: ICD-10-CM

## 2023-05-16 DIAGNOSIS — E11.621 TYPE 2 DIABETES MELLITUS WITH FOOT ULCER: ICD-10-CM

## 2023-05-16 DIAGNOSIS — E78.5 HYPERLIPIDEMIA, UNSPECIFIED: ICD-10-CM

## 2023-05-16 DIAGNOSIS — Z89.421 ACQUIRED ABSENCE OF OTHER RIGHT TOE(S): Chronic | ICD-10-CM

## 2023-05-16 DIAGNOSIS — F32.89 OTHER SPECIFIED DEPRESSIVE EPISODES: ICD-10-CM
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
## 2023-05-16 DIAGNOSIS — Z79.4 LONG TERM (CURRENT) USE OF INSULIN: ICD-10-CM

## 2023-05-16 DIAGNOSIS — Z79.82 LONG TERM (CURRENT) USE OF ASPIRIN: ICD-10-CM

## 2023-05-16 DIAGNOSIS — Z89.422 ACQUIRED ABSENCE OF OTHER LEFT TOE(S): ICD-10-CM

## 2023-05-16 DIAGNOSIS — L97.518 NON-PRESSURE CHRONIC ULCER OF OTHER PART OF RIGHT FOOT WITH OTHER SPECIFIED SEVERITY: ICD-10-CM
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
## 2023-05-16 DIAGNOSIS — J45.30 MILD PERSISTENT ASTHMA, UNCOMPLICATED: ICD-10-CM

## 2023-05-16 DIAGNOSIS — Z79.02 LONG TERM (CURRENT) USE OF ANTITHROMBOTICS/ANTIPLATELETS: ICD-10-CM

## 2023-05-16 DIAGNOSIS — Z89.421 ACQUIRED ABSENCE OF OTHER RIGHT TOE(S): ICD-10-CM

## 2023-05-16 DIAGNOSIS — G40.909 EPILEPSY, UNSPECIFIED, NOT INTRACTABLE, WITHOUT STATUS EPILEPTICUS: ICD-10-CM

## 2023-05-16 DIAGNOSIS — Z89.429 ACQUIRED ABSENCE OF OTHER TOE(S), UNSPECIFIED SIDE: Chronic | ICD-10-CM

## 2023-05-16 DIAGNOSIS — E11.40 TYPE 2 DIABETES MELLITUS WITH DIABETIC NEUROPATHY, UNSPECIFIED: ICD-10-CM

## 2023-05-16 DIAGNOSIS — Z79.84 LONG TERM (CURRENT) USE OF ORAL HYPOGLYCEMIC DRUGS: ICD-10-CM

## 2023-05-16 DIAGNOSIS — T87.43 INFECTION OF AMPUTATION STUMP, RIGHT LOWER EXTREMITY: ICD-10-CM

## 2023-05-16 DIAGNOSIS — I25.10 ATHEROSCLEROTIC HEART DISEASE OF NATIVE CORONARY ARTERY WITHOUT ANGINA PECTORIS: ICD-10-CM

## 2023-05-16 DIAGNOSIS — I10 ESSENTIAL (PRIMARY) HYPERTENSION: ICD-10-CM

## 2023-05-16 DIAGNOSIS — Z79.85 LONG-TERM (CURRENT) USE OF INJECTABLE NON-INSULIN ANTIDIABETIC DRUGS: ICD-10-CM

## 2023-05-16 LAB
ALBUMIN SERPL ELPH-MCNC: 4.2 G/DL — SIGNIFICANT CHANGE UP (ref 3.5–5.2)
ALP SERPL-CCNC: 84 U/L — SIGNIFICANT CHANGE UP (ref 30–115)
ALT FLD-CCNC: 15 U/L — SIGNIFICANT CHANGE UP (ref 0–41)
ANION GAP SERPL CALC-SCNC: 14 MMOL/L — SIGNIFICANT CHANGE UP (ref 7–14)
APTT BLD: 27.6 SEC — SIGNIFICANT CHANGE UP (ref 27–39.2)
AST SERPL-CCNC: 17 U/L — SIGNIFICANT CHANGE UP (ref 0–41)
BASOPHILS # BLD AUTO: 0.03 K/UL — SIGNIFICANT CHANGE UP (ref 0–0.2)
BASOPHILS NFR BLD AUTO: 0.3 % — SIGNIFICANT CHANGE UP (ref 0–1)
BILIRUB SERPL-MCNC: 0.8 MG/DL — SIGNIFICANT CHANGE UP (ref 0.2–1.2)
BUN SERPL-MCNC: 22 MG/DL — HIGH (ref 10–20)
CALCIUM SERPL-MCNC: 9.3 MG/DL — SIGNIFICANT CHANGE UP (ref 8.4–10.5)
CHLORIDE SERPL-SCNC: 108 MMOL/L — SIGNIFICANT CHANGE UP (ref 98–110)
CO2 SERPL-SCNC: 21 MMOL/L — SIGNIFICANT CHANGE UP (ref 17–32)
CREAT SERPL-MCNC: 1.3 MG/DL — SIGNIFICANT CHANGE UP (ref 0.7–1.5)
EGFR: 57 ML/MIN/1.73M2 — LOW
EOSINOPHIL # BLD AUTO: 0.01 K/UL — SIGNIFICANT CHANGE UP (ref 0–0.7)
EOSINOPHIL NFR BLD AUTO: 0.1 % — SIGNIFICANT CHANGE UP (ref 0–8)
GLUCOSE BLDC GLUCOMTR-MCNC: 62 MG/DL — LOW (ref 70–99)
GLUCOSE BLDC GLUCOMTR-MCNC: 69 MG/DL — LOW (ref 70–99)
GLUCOSE SERPL-MCNC: 125 MG/DL — HIGH (ref 70–99)
HCT VFR BLD CALC: 38.2 % — LOW (ref 42–52)
HGB BLD-MCNC: 12.7 G/DL — LOW (ref 14–18)
IMM GRANULOCYTES NFR BLD AUTO: 0.6 % — HIGH (ref 0.1–0.3)
INR BLD: 0.99 RATIO — SIGNIFICANT CHANGE UP (ref 0.65–1.3)
LYMPHOCYTES # BLD AUTO: 0.84 K/UL — LOW (ref 1.2–3.4)
LYMPHOCYTES # BLD AUTO: 8 % — LOW (ref 20.5–51.1)
MAGNESIUM SERPL-MCNC: 1.9 MG/DL — SIGNIFICANT CHANGE UP (ref 1.8–2.4)
MCHC RBC-ENTMCNC: 30 PG — SIGNIFICANT CHANGE UP (ref 27–31)
MCHC RBC-ENTMCNC: 33.2 G/DL — SIGNIFICANT CHANGE UP (ref 32–37)
MCV RBC AUTO: 90.1 FL — SIGNIFICANT CHANGE UP (ref 80–94)
MONOCYTES # BLD AUTO: 1.02 K/UL — HIGH (ref 0.1–0.6)
MONOCYTES NFR BLD AUTO: 9.7 % — HIGH (ref 1.7–9.3)
NEUTROPHILS # BLD AUTO: 8.57 K/UL — HIGH (ref 1.4–6.5)
NEUTROPHILS NFR BLD AUTO: 81.3 % — HIGH (ref 42.2–75.2)
NRBC # BLD: 0 /100 WBCS — SIGNIFICANT CHANGE UP (ref 0–0)
PLATELET # BLD AUTO: 183 K/UL — SIGNIFICANT CHANGE UP (ref 130–400)
PMV BLD: 11.1 FL — HIGH (ref 7.4–10.4)
POTASSIUM SERPL-MCNC: 4.3 MMOL/L — SIGNIFICANT CHANGE UP (ref 3.5–5)
POTASSIUM SERPL-SCNC: 4.3 MMOL/L — SIGNIFICANT CHANGE UP (ref 3.5–5)
PROT SERPL-MCNC: 6.6 G/DL — SIGNIFICANT CHANGE UP (ref 6–8)
PROTHROM AB SERPL-ACNC: 11.3 SEC — SIGNIFICANT CHANGE UP (ref 9.95–12.87)
RBC # BLD: 4.24 M/UL — LOW (ref 4.7–6.1)
RBC # FLD: 13.3 % — SIGNIFICANT CHANGE UP (ref 11.5–14.5)
SODIUM SERPL-SCNC: 143 MMOL/L — SIGNIFICANT CHANGE UP (ref 135–146)
TROPONIN T SERPL-MCNC: <0.01 NG/ML — SIGNIFICANT CHANGE UP
WBC # BLD: 10.53 K/UL — SIGNIFICANT CHANGE UP (ref 4.8–10.8)
WBC # FLD AUTO: 10.53 K/UL — SIGNIFICANT CHANGE UP (ref 4.8–10.8)

## 2023-05-16 PROCEDURE — 99285 EMERGENCY DEPT VISIT HI MDM: CPT | Mod: FS

## 2023-05-16 PROCEDURE — 83735 ASSAY OF MAGNESIUM: CPT

## 2023-05-16 PROCEDURE — 73630 X-RAY EXAM OF FOOT: CPT | Mod: 26,RT

## 2023-05-16 PROCEDURE — 85730 THROMBOPLASTIN TIME PARTIAL: CPT

## 2023-05-16 PROCEDURE — 73630 X-RAY EXAM OF FOOT: CPT | Mod: RT

## 2023-05-16 PROCEDURE — 97162 PT EVAL MOD COMPLEX 30 MIN: CPT | Mod: GP

## 2023-05-16 PROCEDURE — 93010 ELECTROCARDIOGRAM REPORT: CPT

## 2023-05-16 PROCEDURE — 88304 TISSUE EXAM BY PATHOLOGIST: CPT

## 2023-05-16 PROCEDURE — 87186 SC STD MICRODIL/AGAR DIL: CPT

## 2023-05-16 PROCEDURE — 80048 BASIC METABOLIC PNL TOTAL CA: CPT

## 2023-05-16 PROCEDURE — 87075 CULTR BACTERIA EXCEPT BLOOD: CPT

## 2023-05-16 PROCEDURE — 85025 COMPLETE CBC W/AUTO DIFF WBC: CPT

## 2023-05-16 PROCEDURE — 94640 AIRWAY INHALATION TREATMENT: CPT

## 2023-05-16 PROCEDURE — 85610 PROTHROMBIN TIME: CPT

## 2023-05-16 PROCEDURE — 80053 COMPREHEN METABOLIC PANEL: CPT

## 2023-05-16 PROCEDURE — 36415 COLL VENOUS BLD VENIPUNCTURE: CPT

## 2023-05-16 PROCEDURE — 93005 ELECTROCARDIOGRAM TRACING: CPT

## 2023-05-16 PROCEDURE — 85027 COMPLETE CBC AUTOMATED: CPT

## 2023-05-16 PROCEDURE — 83036 HEMOGLOBIN GLYCOSYLATED A1C: CPT

## 2023-05-16 PROCEDURE — 80061 LIPID PANEL: CPT

## 2023-05-16 PROCEDURE — 87077 CULTURE AEROBIC IDENTIFY: CPT

## 2023-05-16 PROCEDURE — 82962 GLUCOSE BLOOD TEST: CPT

## 2023-05-16 PROCEDURE — 87070 CULTURE OTHR SPECIMN AEROBIC: CPT

## 2023-05-16 PROCEDURE — 93925 LOWER EXTREMITY STUDY: CPT

## 2023-05-16 PROCEDURE — 88300 SURGICAL PATH GROSS: CPT

## 2023-05-16 PROCEDURE — 93925 LOWER EXTREMITY STUDY: CPT | Mod: 26

## 2023-05-16 RX ORDER — VANCOMYCIN HCL 1 G
1000 VIAL (EA) INTRAVENOUS ONCE
Refills: 0 | Status: COMPLETED | OUTPATIENT
Start: 2023-05-16 | End: 2023-05-16

## 2023-05-16 RX ORDER — METRONIDAZOLE 500 MG
500 TABLET ORAL ONCE
Refills: 0 | Status: COMPLETED | OUTPATIENT
Start: 2023-05-16 | End: 2023-05-16

## 2023-05-16 RX ORDER — CEFEPIME 1 G/1
2000 INJECTION, POWDER, FOR SOLUTION INTRAMUSCULAR; INTRAVENOUS ONCE
Refills: 0 | Status: COMPLETED | OUTPATIENT
Start: 2023-05-16 | End: 2023-05-16

## 2023-05-16 RX ADMIN — Medication 100 MILLIGRAM(S): at 21:59

## 2023-05-16 RX ADMIN — CEFEPIME 100 MILLIGRAM(S): 1 INJECTION, POWDER, FOR SOLUTION INTRAMUSCULAR; INTRAVENOUS at 21:58

## 2023-05-16 RX ADMIN — Medication 250 MILLIGRAM(S): at 21:59

## 2023-05-16 NOTE — ED ADULT NURSE NOTE - NSFALLRISKINTERV_ED_ALL_ED
Communicate fall risk and risk factors to all staff, patient, and family/Provide visual cue: yellow wristband, yellow gown, etc/Reinforce activity limits and safety measures with patient and family/Use of alarms - bed, stretcher, chair and/or video monitoring/Call bell, personal items and telephone in reach/Instruct patient to call for assistance before getting out of bed/chair/stretcher/Non-slip footwear applied when patient is off stretcher/[place] to call system/Physically safe environment - no spills, clutter or unnecessary equipment/Purposeful Proactive Rounding/Room/bathroom lighting operational, light cord in reach

## 2023-05-16 NOTE — ED PROVIDER NOTE - OBJECTIVE STATEMENT
73 year old male with a history of DM, HTN, HLD, CAD and multiple DFU requiring toe amputations presents to the ED with DFU right foot. Patient was seen in clinic by Dr. Bhatia who noted right stump DFU with pus discharge now plans for surgcal debridement inpatient. At this time patient is not in pain and denies fevers. Denies chest pain, shortness of breath, abdominal pain, nausea, vomiting, diarrhea, constipation, dysuria, hematuria, lower extremity swelling, rash. Private car

## 2023-05-16 NOTE — ED ADULT NURSE NOTE - SUICIDE SCREENING QUESTION 3
Continue        Adjustments to plan of care: Cont POC. Patients Report of Tolerance: good bx overall. No glasses.       Communication with other providers: POC sent to PCP     Equipment provided to patient:none     Changes in medical status or medications: none     PLAN: see pt. 1 time per week for 30-45 minutes.       Electronically Signed by RICARDO Lunsford,  08/27/19
No

## 2023-05-16 NOTE — ED PROVIDER NOTE - PHYSICAL EXAMINATION
Physical Exam    Constitutional: No acute distress.   Eyes: Conjunctiva pink, Sclera clear, PERRLA, EOMI.  ENT: No sinus tenderness. No nasal discharge. No oropharyngeal erythema, edema, or exudates. Uvula midline.   Cardiovascular: Regular rate, regular rhythm. No noted murmurs rubs or gallops.  Respiratory: unlabored respiratory effort, clear to auscultation bilaterally no wheezing, rales or rhonchi  Gastrointestinal: Normal bowel sounds. soft, non distended, non-tender abdomen.   Musculoskeletal: supple neck, no midline tenderness. Right foot DFU base of stump with serosanguinous drainage   Integumentary: warm, dry, no rash  Neurologic: awake, alert, cranial nerves II-XII grossly intact, extremities’ motor and sensory functions grossly intact  Psychiatric: appropriate mood, appropriate affect

## 2023-05-16 NOTE — ED PROVIDER NOTE - CLINICAL SUMMARY MEDICAL DECISION MAKING FREE TEXT BOX
73 year old male with a history of DM, HTN, HLD, CAD and multiple DFU requiring toe amputations presents to the ED with DFU right foot. Vitals noted, triage note reviewed, and agree with exam as documented above.  Discussed with podiatry at the bedside.  Preop labs, imaging sent.  Patient admitted for IV antibiotics, definitive management.  Patient updated the bedside.

## 2023-05-16 NOTE — ED PROVIDER NOTE - NS ED ATTENDING STATEMENT MOD
This was a shared visit with the KAMILLE. I reviewed and verified the documentation and independently performed the documented:

## 2023-05-17 LAB
A1C WITH ESTIMATED AVERAGE GLUCOSE RESULT: 7.3 % — HIGH (ref 4–5.6)
ANION GAP SERPL CALC-SCNC: 12 MMOL/L — SIGNIFICANT CHANGE UP (ref 7–14)
APTT BLD: 31.5 SEC — SIGNIFICANT CHANGE UP (ref 27–39.2)
BUN SERPL-MCNC: 15 MG/DL — SIGNIFICANT CHANGE UP (ref 10–20)
CALCIUM SERPL-MCNC: 9.3 MG/DL — SIGNIFICANT CHANGE UP (ref 8.4–10.4)
CHLORIDE SERPL-SCNC: 109 MMOL/L — SIGNIFICANT CHANGE UP (ref 98–110)
CHOLEST SERPL-MCNC: 103 MG/DL — SIGNIFICANT CHANGE UP
CO2 SERPL-SCNC: 24 MMOL/L — SIGNIFICANT CHANGE UP (ref 17–32)
CREAT SERPL-MCNC: 0.9 MG/DL — SIGNIFICANT CHANGE UP (ref 0.7–1.5)
EGFR: 89 ML/MIN/1.73M2 — SIGNIFICANT CHANGE UP
ESTIMATED AVERAGE GLUCOSE: 163 MG/DL — HIGH (ref 68–114)
GLUCOSE BLDC GLUCOMTR-MCNC: 120 MG/DL — HIGH (ref 70–99)
GLUCOSE BLDC GLUCOMTR-MCNC: 136 MG/DL — HIGH (ref 70–99)
GLUCOSE BLDC GLUCOMTR-MCNC: 141 MG/DL — HIGH (ref 70–99)
GLUCOSE BLDC GLUCOMTR-MCNC: 78 MG/DL — SIGNIFICANT CHANGE UP (ref 70–99)
GLUCOSE BLDC GLUCOMTR-MCNC: 87 MG/DL — SIGNIFICANT CHANGE UP (ref 70–99)
GLUCOSE SERPL-MCNC: 84 MG/DL — SIGNIFICANT CHANGE UP (ref 70–99)
HCT VFR BLD CALC: 38.7 % — LOW (ref 42–52)
HDLC SERPL-MCNC: 52 MG/DL — SIGNIFICANT CHANGE UP
HGB BLD-MCNC: 12.9 G/DL — LOW (ref 14–18)
INR BLD: 1.05 RATIO — SIGNIFICANT CHANGE UP (ref 0.65–1.3)
LIPID PNL WITH DIRECT LDL SERPL: 40 MG/DL — SIGNIFICANT CHANGE UP
MAGNESIUM SERPL-MCNC: 1.8 MG/DL — SIGNIFICANT CHANGE UP (ref 1.8–2.4)
MCHC RBC-ENTMCNC: 29.7 PG — SIGNIFICANT CHANGE UP (ref 27–31)
MCHC RBC-ENTMCNC: 33.3 G/DL — SIGNIFICANT CHANGE UP (ref 32–37)
MCV RBC AUTO: 89.2 FL — SIGNIFICANT CHANGE UP (ref 80–94)
NON HDL CHOLESTEROL: 51 MG/DL — SIGNIFICANT CHANGE UP
NRBC # BLD: 0 /100 WBCS — SIGNIFICANT CHANGE UP (ref 0–0)
PLATELET # BLD AUTO: 186 K/UL — SIGNIFICANT CHANGE UP (ref 130–400)
PMV BLD: 11 FL — HIGH (ref 7.4–10.4)
POTASSIUM SERPL-MCNC: 3.6 MMOL/L — SIGNIFICANT CHANGE UP (ref 3.5–5)
POTASSIUM SERPL-SCNC: 3.6 MMOL/L — SIGNIFICANT CHANGE UP (ref 3.5–5)
PROTHROM AB SERPL-ACNC: 12 SEC — SIGNIFICANT CHANGE UP (ref 9.95–12.87)
RBC # BLD: 4.34 M/UL — LOW (ref 4.7–6.1)
RBC # FLD: 13.1 % — SIGNIFICANT CHANGE UP (ref 11.5–14.5)
SODIUM SERPL-SCNC: 145 MMOL/L — SIGNIFICANT CHANGE UP (ref 135–146)
TRIGL SERPL-MCNC: 54 MG/DL — SIGNIFICANT CHANGE UP
WBC # BLD: 9.08 K/UL — SIGNIFICANT CHANGE UP (ref 4.8–10.8)
WBC # FLD AUTO: 9.08 K/UL — SIGNIFICANT CHANGE UP (ref 4.8–10.8)

## 2023-05-17 PROCEDURE — 99222 1ST HOSP IP/OBS MODERATE 55: CPT | Mod: 25,57

## 2023-05-17 PROCEDURE — 99233 SBSQ HOSP IP/OBS HIGH 50: CPT

## 2023-05-17 RX ORDER — ASPIRIN/CALCIUM CARB/MAGNESIUM 324 MG
81 TABLET ORAL DAILY
Refills: 0 | Status: DISCONTINUED | OUTPATIENT
Start: 2023-05-17 | End: 2023-05-18

## 2023-05-17 RX ORDER — LEVETIRACETAM 250 MG/1
750 TABLET, FILM COATED ORAL
Refills: 0 | Status: DISCONTINUED | OUTPATIENT
Start: 2023-05-17 | End: 2023-05-18

## 2023-05-17 RX ORDER — DEXTROSE 50 % IN WATER 50 %
25 SYRINGE (ML) INTRAVENOUS ONCE
Refills: 0 | Status: DISCONTINUED | OUTPATIENT
Start: 2023-05-17 | End: 2023-05-18

## 2023-05-17 RX ORDER — DEXTROSE 50 % IN WATER 50 %
15 SYRINGE (ML) INTRAVENOUS ONCE
Refills: 0 | Status: DISCONTINUED | OUTPATIENT
Start: 2023-05-17 | End: 2023-05-18

## 2023-05-17 RX ORDER — PREDNISOLONE SODIUM PHOSPHATE 1 %
1 DROPS OPHTHALMIC (EYE)
Refills: 0 | Status: DISCONTINUED | OUTPATIENT
Start: 2023-05-17 | End: 2023-05-18

## 2023-05-17 RX ORDER — SODIUM CHLORIDE 9 MG/ML
1000 INJECTION, SOLUTION INTRAVENOUS
Refills: 0 | Status: DISCONTINUED | OUTPATIENT
Start: 2023-05-17 | End: 2023-05-18

## 2023-05-17 RX ORDER — ENOXAPARIN SODIUM 100 MG/ML
40 INJECTION SUBCUTANEOUS EVERY 24 HOURS
Refills: 0 | Status: DISCONTINUED | OUTPATIENT
Start: 2023-05-17 | End: 2023-05-18

## 2023-05-17 RX ORDER — SODIUM CHLORIDE 9 MG/ML
1000 INJECTION INTRAMUSCULAR; INTRAVENOUS; SUBCUTANEOUS
Refills: 0 | Status: DISCONTINUED | OUTPATIENT
Start: 2023-05-17 | End: 2023-05-17

## 2023-05-17 RX ORDER — BUDESONIDE AND FORMOTEROL FUMARATE DIHYDRATE 160; 4.5 UG/1; UG/1
2 AEROSOL RESPIRATORY (INHALATION)
Refills: 0 | Status: DISCONTINUED | OUTPATIENT
Start: 2023-05-17 | End: 2023-05-18

## 2023-05-17 RX ORDER — CEFTRIAXONE 500 MG/1
2000 INJECTION, POWDER, FOR SOLUTION INTRAMUSCULAR; INTRAVENOUS EVERY 24 HOURS
Refills: 0 | Status: DISCONTINUED | OUTPATIENT
Start: 2023-05-17 | End: 2023-05-17

## 2023-05-17 RX ORDER — METOPROLOL TARTRATE 50 MG
25 TABLET ORAL
Refills: 0 | Status: DISCONTINUED | OUTPATIENT
Start: 2023-05-17 | End: 2023-05-18

## 2023-05-17 RX ORDER — GLUCAGON INJECTION, SOLUTION 0.5 MG/.1ML
1 INJECTION, SOLUTION SUBCUTANEOUS ONCE
Refills: 0 | Status: DISCONTINUED | OUTPATIENT
Start: 2023-05-17 | End: 2023-05-18

## 2023-05-17 RX ORDER — METRONIDAZOLE 500 MG
500 TABLET ORAL EVERY 8 HOURS
Refills: 0 | Status: DISCONTINUED | OUTPATIENT
Start: 2023-05-17 | End: 2023-05-17

## 2023-05-17 RX ORDER — NIFEDIPINE 30 MG
30 TABLET, EXTENDED RELEASE 24 HR ORAL ONCE
Refills: 0 | Status: COMPLETED | OUTPATIENT
Start: 2023-05-17 | End: 2023-05-17

## 2023-05-17 RX ORDER — INSULIN LISPRO 100/ML
VIAL (ML) SUBCUTANEOUS
Refills: 0 | Status: DISCONTINUED | OUTPATIENT
Start: 2023-05-17 | End: 2023-05-18

## 2023-05-17 RX ORDER — ONDANSETRON 8 MG/1
4 TABLET, FILM COATED ORAL EVERY 8 HOURS
Refills: 0 | Status: DISCONTINUED | OUTPATIENT
Start: 2023-05-17 | End: 2023-05-18

## 2023-05-17 RX ORDER — LANOLIN ALCOHOL/MO/W.PET/CERES
3 CREAM (GRAM) TOPICAL AT BEDTIME
Refills: 0 | Status: DISCONTINUED | OUTPATIENT
Start: 2023-05-17 | End: 2023-05-18

## 2023-05-17 RX ORDER — PANTOPRAZOLE SODIUM 20 MG/1
40 TABLET, DELAYED RELEASE ORAL
Refills: 0 | Status: DISCONTINUED | OUTPATIENT
Start: 2023-05-17 | End: 2023-05-18

## 2023-05-17 RX ORDER — OFLOXACIN 0.3 %
1 DROPS OPHTHALMIC (EYE)
Refills: 0 | Status: DISCONTINUED | OUTPATIENT
Start: 2023-05-17 | End: 2023-05-18

## 2023-05-17 RX ORDER — ACETAMINOPHEN 500 MG
650 TABLET ORAL EVERY 6 HOURS
Refills: 0 | Status: DISCONTINUED | OUTPATIENT
Start: 2023-05-17 | End: 2023-05-18

## 2023-05-17 RX ORDER — METFORMIN HYDROCHLORIDE 850 MG/1
1 TABLET ORAL
Qty: 0 | Refills: 0 | DISCHARGE

## 2023-05-17 RX ORDER — ALBUTEROL 90 UG/1
2 AEROSOL, METERED ORAL EVERY 6 HOURS
Refills: 0 | Status: DISCONTINUED | OUTPATIENT
Start: 2023-05-17 | End: 2023-05-18

## 2023-05-17 RX ORDER — LACOSAMIDE 50 MG/1
100 TABLET ORAL
Refills: 0 | Status: DISCONTINUED | OUTPATIENT
Start: 2023-05-17 | End: 2023-05-18

## 2023-05-17 RX ORDER — INSULIN LISPRO 100/ML
VIAL (ML) SUBCUTANEOUS AT BEDTIME
Refills: 0 | Status: DISCONTINUED | OUTPATIENT
Start: 2023-05-17 | End: 2023-05-18

## 2023-05-17 RX ADMIN — LEVETIRACETAM 750 MILLIGRAM(S): 250 TABLET, FILM COATED ORAL at 17:56

## 2023-05-17 RX ADMIN — LEVETIRACETAM 750 MILLIGRAM(S): 250 TABLET, FILM COATED ORAL at 07:00

## 2023-05-17 RX ADMIN — Medication 81 MILLIGRAM(S): at 12:11

## 2023-05-17 RX ADMIN — PANTOPRAZOLE SODIUM 40 MILLIGRAM(S): 20 TABLET, DELAYED RELEASE ORAL at 07:01

## 2023-05-17 RX ADMIN — Medication 1 DROP(S): at 07:00

## 2023-05-17 RX ADMIN — Medication 25 MILLIGRAM(S): at 17:56

## 2023-05-17 RX ADMIN — Medication 30 MILLIGRAM(S): at 04:49

## 2023-05-17 RX ADMIN — ENOXAPARIN SODIUM 40 MILLIGRAM(S): 100 INJECTION SUBCUTANEOUS at 17:56

## 2023-05-17 RX ADMIN — Medication 1 DROP(S): at 12:12

## 2023-05-17 RX ADMIN — LACOSAMIDE 100 MILLIGRAM(S): 50 TABLET ORAL at 05:04

## 2023-05-17 RX ADMIN — Medication 25 MILLIGRAM(S): at 13:56

## 2023-05-17 RX ADMIN — Medication 1 DROP(S): at 17:47

## 2023-05-17 RX ADMIN — Medication 1 DROP(S): at 18:00

## 2023-05-17 RX ADMIN — Medication 500 MILLIGRAM(S): at 05:05

## 2023-05-17 RX ADMIN — LACOSAMIDE 100 MILLIGRAM(S): 50 TABLET ORAL at 17:56

## 2023-05-17 NOTE — CONSULT NOTE ADULT - SUBJECTIVE AND OBJECTIVE BOX
Podiatry Consult Note    Subjective:  JOSÉ MANUEL PORTER  Seen Bedside 73y Male  .   Patient is a 73y old  Male who presents with a chief complaint of R DFU, history of R TMA. Pt was seen by Dr. Bhatia in clinic yesterday 5/15 and was advised to head to ED to be admitted for surgical debridement   HPI:      Past Medical History and Surgical History  PAST MEDICAL & SURGICAL HISTORY:  DM (diabetes mellitus)  Type 2, 12/27/18 hgb aic  11.2      HTN (hypertension)      Dyslipidemia      Diabetic foot ulcer      Depression      GERD (gastroesophageal reflux disease)      Neuropathy, diabetic      Toe amputation status, right  (2008)      History of amputation of toe  LT -partial 1st toe           Review of Systems:  [X] Ten point review of systems is otherwise negative except as noted     Objective:  Vital Signs Last 24 Hrs  T(C): 37 (16 May 2023 18:35), Max: 37 (16 May 2023 18:35)  T(F): 98.6 (16 May 2023 18:35), Max: 98.6 (16 May 2023 18:35)  HR: 88 (16 May 2023 18:35) (88 - 88)  BP: 165/77 (16 May 2023 18:35) (165/77 - 165/77)  BP(mean): --  RR: 16 (16 May 2023 18:35) (16 - 16)  SpO2: 99% (16 May 2023 18:35) (99% - 99%)    Parameters below as of 16 May 2023 18:35  Patient On (Oxygen Delivery Method): room air                              Physical Exam - Lower Extremity Focused:   Derm:  circular wound plantar aspect of the R TMA amputation stump, probes deep to bone, surrounding hyperkeratosis, base is fibrotic with serosanguinous drainage  Vascular: DP and PT Pulses Diminished; Foot is Warm to Warm to the touch; Capillary Refill Time < 3 Seconds;    Neuro: Protective Sensation Diminished / Moderately Neuropathic   MSK: No Pain On Palpation, R TMA    Assessment:   - R DFU, infected     Plan:  Chart reviewed and Patient evaluated. All Questions and Concerns Addressed and Answered  XR Imaging Right Foot; Pending Results  Wound Culture Obtained; Sent to Microbiology; Pending Results   Local Wound Care; Wound Flushed w/ NS; Wound Packed w/ Betadine Soaked Gauze / DSD / Kerlix   Weight Bearing Status; WBAT w surgical shoe   Request ID Consult;   Please admit the pt for surgical debridement, request medical clearance will update the date of surgery once is booked  Discussed Plan w/ Dr. Bhatia    Podiatry 
  CHARLOTTE JOSÉ MANUEL  73y, Male  Allergy: No Known Allergies      All historical available data reviewed.    HPI:  74yo man pmhx DM 2, hx of osteomyelitis, gerd, bipolar depression, HLD, HTN, presents from podiatry office for surgical debridement of R foot ulcer.     Per pt, some pain, but no fevers, n/v/d, no cp, sob, st;  endorses being in his usual state of health outside of some manageable pain.    ED  /77 HR 88 RR 16 99% T 98.6F  WBC 10.53 Hg 12.7 Plt 183 BUN 22 Cr 1.3    Given cef, flag, vanc and admitted to medicine   (17 May 2023 01:49)    FAMILY HISTORY:  Family history of lung cancer (Mother)    Family history of ischemic heart disease (IHD) (Father)      PAST MEDICAL & SURGICAL HISTORY:  DM (diabetes mellitus)  Type 2, 12/27/18 hgb aic  11.2      HTN (hypertension)      Dyslipidemia      Diabetic foot ulcer      Depression      GERD (gastroesophageal reflux disease)      Neuropathy, diabetic      Toe amputation status, right  (2008)      History of amputation of toe  LT -partial 1st toe            VITALS:  T(F): 98.9, Max: 98.9 (05-17-23 @ 04:50)  HR: 75  BP: 188/99  RR: 19Vital Signs Last 24 Hrs  T(C): 37.2 (17 May 2023 04:50), Max: 37.2 (17 May 2023 04:50)  T(F): 98.9 (17 May 2023 04:50), Max: 98.9 (17 May 2023 04:50)  HR: 75 (17 May 2023 04:50) (75 - 88)  BP: 188/99 (17 May 2023 04:50) (165/77 - 188/99)  BP(mean): --  RR: 19 (17 May 2023 04:50) (16 - 19)  SpO2: 100% (17 May 2023 04:50) (99% - 100%)    Parameters below as of 17 May 2023 04:50  Patient On (Oxygen Delivery Method): room air        TESTS & MEASUREMENTS:                        12.9   9.08  )-----------( 186      ( 17 May 2023 09:10 )             38.7     05-17    145  |  109  |  15  ----------------------------<  84  3.6   |  24  |  0.9    Ca    9.3      17 May 2023 09:10  Mg     1.8     05-17    TPro  6.6  /  Alb  4.2  /  TBili  0.8  /  DBili  x   /  AST  17  /  ALT  15  /  AlkPhos  84  05-16    LIVER FUNCTIONS - ( 16 May 2023 19:52 )  Alb: 4.2 g/dL / Pro: 6.6 g/dL / ALK PHOS: 84 U/L / ALT: 15 U/L / AST: 17 U/L / GGT: x                   RADIOLOGY & ADDITIONAL TESTS:  Personal review of radiological diagnostics performed  Echo and EKG results noted when applicable.     MEDICATIONS:  acetaminophen     Tablet .. 650 milliGRAM(s) Oral every 6 hours PRN  albuterol    90 MICROgram(s) HFA Inhaler 2 Puff(s) Inhalation every 6 hours PRN  aluminum hydroxide/magnesium hydroxide/simethicone Suspension 30 milliLiter(s) Oral every 4 hours PRN  aspirin  chewable 81 milliGRAM(s) Oral daily  budesonide  80 MICROgram(s)/formoterol 4.5 MICROgram(s) Inhaler 2 Puff(s) Inhalation two times a day  cefTRIAXone   IVPB 2000 milliGRAM(s) IV Intermittent every 24 hours  dextrose 5%. 1000 milliLiter(s) IV Continuous <Continuous>  dextrose 5%. 1000 milliLiter(s) IV Continuous <Continuous>  dextrose 50% Injectable 25 Gram(s) IV Push once  dextrose Oral Gel 15 Gram(s) Oral once PRN  enoxaparin Injectable 40 milliGRAM(s) SubCutaneous every 24 hours  glucagon  Injectable 1 milliGRAM(s) IntraMuscular once  insulin lispro (ADMELOG) corrective regimen sliding scale   SubCutaneous three times a day before meals  insulin lispro (ADMELOG) corrective regimen sliding scale   SubCutaneous at bedtime  lacosamide 100 milliGRAM(s) Oral two times a day  levETIRAcetam 750 milliGRAM(s) Oral two times a day  melatonin 3 milliGRAM(s) Oral at bedtime PRN  metoprolol tartrate 25 milliGRAM(s) Oral two times a day  metroNIDAZOLE    Tablet 500 milliGRAM(s) Oral every 8 hours  ofloxacin 0.3% Solution 1 Drop(s) Right EYE four times a day  ondansetron Injectable 4 milliGRAM(s) IV Push every 8 hours PRN  pantoprazole    Tablet 40 milliGRAM(s) Oral before breakfast  prednisoLONE acetate 1% Suspension 1 Drop(s) Right EYE four times a day      ANTIBIOTICS:  cefTRIAXone   IVPB 2000 milliGRAM(s) IV Intermittent every 24 hours  metroNIDAZOLE    Tablet 500 milliGRAM(s) Oral every 8 hours

## 2023-05-17 NOTE — H&P ADULT - HISTORY OF PRESENT ILLNESS
72yo man pmhx DM 2, hx of osteomyelitis, gerd, bipolar depression, HLD, HTN, presents from podiatry office for surgical debridement of R foot ulcer.     Per pt, some pain, but no fevers, n/v/d, no cp, sob, st;  endorses being in his usual state of health outside of some manageable pain.    ED  /77 HR 88 RR 16 99% T 98.6F  WBC 10.53 Hg 12.7 Plt 183 BUN 22 Cr 1.3    Given cef, flag, vanc and admitted to medicine

## 2023-05-17 NOTE — H&P ADULT - NSHPLABSRESULTS_GEN_ALL_CORE
LABS:                        12.7   10.53 )-----------( 183      ( 16 May 2023 19:52 )             38.2     05-16    143  |  108  |  22<H>  ----------------------------<  125<H>  4.3   |  21  |  1.3    Ca    9.3      16 May 2023 19:52  Mg     1.9     05-16    TPro  6.6  /  Alb  4.2  /  TBili  0.8  /  DBili  x   /  AST  17  /  ALT  15  /  AlkPhos  84  05-16    PT/INR - ( 16 May 2023 19:52 )   PT: 11.30 sec;   INR: 0.99 ratio         PTT - ( 16 May 2023 19:52 )  PTT:27.6 sec      Troponin T, Serum: <0.01 ng/mL (05-16-23 @ 19:52)      CARDIAC MARKERS ( 16 May 2023 19:52 )  x     / <0.01 ng/mL / x     / x     / x          VITALS:   T(F): 98.8  HR: 86  BP: 178/86  RR: 18  SpO2: 100%

## 2023-05-17 NOTE — H&P ADULT - ATTENDING COMMENTS
72yo man pmhx DM 2, hx of osteomyelitis, gerd, bipolar depression, HLD, HTN, presents from podiatry office for surgical debridement of R foot ulcer.     a/p  # DFU, right foot.   plan is surgery tomorrow,   pt is moderate risk for foot surgery   cont abx  fu cultures     # DM , avoid hypoglycemia, decrease lantus, cont insulin per protocol   CAPILLARY BLOOD GLUCOSE  POCT Blood Glucose.: 87 mg/dL (17 May 2023 11:28)  POCT Blood Glucose.: 78 mg/dL (17 May 2023 07:59)  POCT Blood Glucose.: 141 mg/dL (17 May 2023 00:58)  POCT Blood Glucose.: 69 mg/dL (16 May 2023 23:51)  POCT Blood Glucose.: 62 mg/dL (16 May 2023 22:46)    # GERD cont meds     # HTN,  uncontrolled, adjust meds , add bb and will reevaluate , will probably need 2 meds   # Hyperlipidemia cont meds    # seizure  - c/w home Keppra 750 bid    # bipolar   - c/w home lacosamide 100 bid    # eye disease? patient is on eye drops, cont     #Progress Note Handoff  Pending :  surgery in am   Family discussion: dw pt   Disposition: home, reevaluate after surgery , may need services or SNF , may need long term abx

## 2023-05-17 NOTE — PROGRESS NOTE ADULT - SUBJECTIVE AND OBJECTIVE BOX
Podiatry Progress Note    Subjective:  JOSÉ MANUEL PORTER is a  73y Male.   Seen bedside.   Patient is a 73y old  Male who presents with a chief complaint of     Past Medical History and Surgical History  PAST MEDICAL & SURGICAL HISTORY:  DM (diabetes mellitus)  Type 2, 12/27/18 hgb aic  11.2      HTN (hypertension)      Dyslipidemia      Diabetic foot ulcer      Depression      GERD (gastroesophageal reflux disease)      Neuropathy, diabetic      Toe amputation status, right  (2008)      History of amputation of toe  LT -partial 1st toe           Objective:  Vital Signs Last 24 Hrs  T(C): 37.2 (17 May 2023 04:50), Max: 37.2 (17 May 2023 04:50)  T(F): 98.9 (17 May 2023 04:50), Max: 98.9 (17 May 2023 04:50)  HR: 75 (17 May 2023 04:50) (75 - 88)  BP: 188/99 (17 May 2023 04:50) (165/77 - 188/99)  BP(mean): --  RR: 19 (17 May 2023 04:50) (16 - 19)  SpO2: 100% (17 May 2023 04:50) (99% - 100%)    Parameters below as of 17 May 2023 04:50  Patient On (Oxygen Delivery Method): room air                            12.7   10.53 )-----------( 183      ( 16 May 2023 19:52 )             38.2                 05-16    143  |  108  |  22<H>  ----------------------------<  125<H>  4.3   |  21  |  1.3    Ca    9.3      16 May 2023 19:52  Mg     1.9     05-16    TPro  6.6  /  Alb  4.2  /  TBili  0.8  /  DBili  x   /  AST  17  /  ALT  15  /  AlkPhos  84  05-16        Physical Exam - Lower Extremity Focused:   Derm:  circular wound plantar aspect of the R TMA amputation stump, probes deep to bone, surrounding hyperkeratosis, base is fibrotic with serosanguinous drainage  Vascular: Pedal pulses diminished; Foot is Warm to warm to touch. Cap refill < 5 sec  Neuro: Protective Sensation Diminished / Moderately Neuropathic   MSK: No Pain On Palpation, R TMA    Assessment:   - R DFU, infected     Plan:  Chart reviewed and Patient evaluated. All Questions and Concerns Addressed and Answered  XR Imaging Right Foot; Pending Results  Wound Culture Obtained; Sent to Microbiology; Pending Results   Local Wound Care; Wound Flushed w/ NS; Wound Packed w/ Betadine Soaked Gauze / DSD / Kerlix   Weight Bearing Status; WBAT w surgical shoe   Scheduled for surgery tomorrow Thurs 5/18 after 3pm: excisional debridement of soft tissue/bone right foot  Please make pt NPO MN 5/17  Optimize lab values prior to OR  Please document medical clearance in chart  Discussed Plan w/ Dr. Bhatia    Podiatry      Podiatry Progress Note    Subjective:  JOSÉ MANUEL PORTER is a  73y Male.   Seen bedside.   Patient is a 73y old  Male who presents with a chief complaint of     Past Medical History and Surgical History  PAST MEDICAL & SURGICAL HISTORY:  DM (diabetes mellitus)  Type 2, 12/27/18 hgb aic  11.2      HTN (hypertension)      Dyslipidemia      Diabetic foot ulcer      Depression      GERD (gastroesophageal reflux disease)      Neuropathy, diabetic      Toe amputation status, right  (2008)      History of amputation of toe  LT -partial 1st toe           Objective:  Vital Signs Last 24 Hrs  T(C): 37.2 (17 May 2023 04:50), Max: 37.2 (17 May 2023 04:50)  T(F): 98.9 (17 May 2023 04:50), Max: 98.9 (17 May 2023 04:50)  HR: 75 (17 May 2023 04:50) (75 - 88)  BP: 188/99 (17 May 2023 04:50) (165/77 - 188/99)  BP(mean): --  RR: 19 (17 May 2023 04:50) (16 - 19)  SpO2: 100% (17 May 2023 04:50) (99% - 100%)    Parameters below as of 17 May 2023 04:50  Patient On (Oxygen Delivery Method): room air                            12.7   10.53 )-----------( 183      ( 16 May 2023 19:52 )             38.2                 05-16    143  |  108  |  22<H>  ----------------------------<  125<H>  4.3   |  21  |  1.3    Ca    9.3      16 May 2023 19:52  Mg     1.9     05-16    TPro  6.6  /  Alb  4.2  /  TBili  0.8  /  DBili  x   /  AST  17  /  ALT  15  /  AlkPhos  84  05-16        Physical Exam - Lower Extremity Focused:   Derm:  circular wound plantar aspect of the R TMA amputation stump, probes deep to bone, surrounding hyperkeratosis, base is fibrotic with serosanguinous drainage  Vascular: Pedal pulses diminished; Foot is Warm to warm to touch. Cap refill < 5 sec  Neuro: Protective Sensation Diminished / Moderately Neuropathic   MSK: No Pain On Palpation, R TMA    Assessment:   - R DFU, infected     Plan:  Chart reviewed and Patient evaluated. All Questions and Concerns Addressed and Answered  XR Imaging Right Foot; Pending Results  Wound Culture Obtained; Sent to Microbiology; Pending Results   Local Wound Care; Wound Flushed w/ NS; Wound Packed w/ Betadine Soaked Gauze / DSD / Kerlix   Weight Bearing Status; WBAT w surgical shoe   Scheduled for surgery tomorrow Thurs 5/18 at 7:30am: excisional debridement of soft tissue/bone right foot  Please make pt NPO MN 5/17  Optimize lab values prior to OR  Please document medical clearance in chart  Discussed Plan w/ Dr. Bhatia    Podiatry

## 2023-05-17 NOTE — H&P ADULT - NSHPPHYSICALEXAM_GEN_ALL_CORE
CONSTITUTIONAL: NAD    EYES: PERRLA and symmetric, EOMI, No conjunctival or scleral injection, non-icteric    ENMT: Oral mucosa with moist membranes. No external nasal lesions; normal dentition; no gross hearing impairment noted.    NECK: Supple, symmetric and without tracheal deviation; thyroid gland not enlarged and without palpable masses    RESPIRATORY: No respiratory distress, no use of accessory muscles; CTA b/l, no wheezes, rales or rhonchi, no dullness or hyperresonance to percussion, no tactile fremitus, no subcutaneous emphysema    CARDIOVASCULAR: RRRR, +S1S2, no murmur appreciated, no rubs, no gallops; no JVD; no peripheral edema    Vascular: no carotid bruits; no abdominal bruit; carotid pulse palpable, radial pulse palpable, femoral pulse palpable, posterior tibialis pulse palpable    GASTROINTESTINAL: Soft, non tender, non distended, no rebound, no guarding; No palpable masses; no hepatosplenomegaly; no hernia palpated;    MUSCULOSKELETAL: no significant limitation on ROM, moves all extremities in plane of bed    SKIN: No rashes or ulcers noted; no subcutaneous nodules or induration palpable    NEUROLOGIC: moves all extremities against resistance     PSYCHIATRIC: Appropriate insight/judgment; A+O x 3, mood and affect appropriate, recent/remote memory intact

## 2023-05-17 NOTE — PATIENT PROFILE ADULT - FALL HARM RISK - HARM RISK INTERVENTIONS
Detail Level: Zone Quality 265: Biopsy Follow-Up: Biopsy results reviewed, communicated, tracked, and documented Assistance with ambulation/Assistance OOB with selected safe patient handling equipment/Communicate Risk of Fall with Harm to all staff/Discuss with provider need for PT consult/Monitor gait and stability/Reinforce activity limits and safety measures with patient and family/Tailored Fall Risk Interventions/Visual Cue: Yellow wristband and red socks/Bed in lowest position, wheels locked, appropriate side rails in place/Call bell, personal items and telephone in reach/Instruct patient to call for assistance before getting out of bed or chair/Non-slip footwear when patient is out of bed/[place] to call system/Physically safe environment - no spills, clutter or unnecessary equipment/Purposeful Proactive Rounding/Room/bathroom lighting operational, light cord in reach

## 2023-05-17 NOTE — PROGRESS NOTE ADULT - ATTENDING COMMENTS
right plantar foot ulcer with retained staples.   xrays reviewed  presence of necrotic tissue within the ulcer with drainage   OR tomorrow for excisional debridement of tissue, possible bone with removal of foreign body (staples)

## 2023-05-17 NOTE — H&P ADULT - ASSESSMENT
74yo man pmhx DM 2, hx of osteomyelitis, gerd, bipolar depression, szr disorder, HLD, HTN, presents from podiatry office for surgical debridement of R foot ulcer.     # foot ulcer  - no signs of systemic infection  - ceftriaxone flagyl  - needs medical risk stratification  - f/u ID recs  - f/u podiatry for surgery date    # DM  - per last endo note only on PO  - f/u A1C   - ISS for now, titrate as needed    # szr  - c/w home keppra 750 bid    # bipolar   - c/w home lacosamide 100 bid    # GERD  - therapeutic exchange for home omeprazole     # HTN/HLD  - per pt not on statin   - f/u w/ pt if this is intentional or an oversight     DVT lovenox  diet dash/tlc/cc

## 2023-05-17 NOTE — H&P ADULT - NSHPREVIEWOFSYSTEMS_GEN_ALL_CORE
General:	denies night sweats, wt changes, cold/heat intolerance    Skin: denies rashes, pruritis, nail/hair changes  	  Ophthalmologic: denies vision changes, denies eye discharge or irritation  	  ENMT: denies nasal discharge, hearing changes, mouth sores, difficulty swallowing    Respiratory and Thorax: endorses mild non productive cough, denies difficulty breathing, denies shortness of breath  	  Cardiovascular: denies CP, denies PHILLIPS, denies palpitation    Gastrointestinal: denies n/v/d    Genitourinary: denies urgency, bruning, nocturia    Musculoskeletal: denies muscle/joint pain    Neurological: denies dizziness, syncope, HA    Psychiatric: denies si/hi and hallucinations    Hematology/Lymphatics: denies easy bleeding/bruising    Endocrine: denies wt changes, hair/nail changes, denies cold/heat sensitivity

## 2023-05-18 ENCOUNTER — RESULT REVIEW (OUTPATIENT)
Age: 73
End: 2023-05-18

## 2023-05-18 LAB
GLUCOSE BLDC GLUCOMTR-MCNC: 134 MG/DL — HIGH (ref 70–99)
GLUCOSE BLDC GLUCOMTR-MCNC: 202 MG/DL — HIGH (ref 70–99)
GLUCOSE BLDC GLUCOMTR-MCNC: 207 MG/DL — HIGH (ref 70–99)
GLUCOSE BLDC GLUCOMTR-MCNC: 221 MG/DL — HIGH (ref 70–99)

## 2023-05-18 PROCEDURE — 73630 X-RAY EXAM OF FOOT: CPT | Mod: 26,RT

## 2023-05-18 PROCEDURE — 88300 SURGICAL PATH GROSS: CPT | Mod: 26

## 2023-05-18 PROCEDURE — 11043 DBRDMT MUSC&/FSCA 1ST 20/<: CPT | Mod: 59

## 2023-05-18 PROCEDURE — 28193 REMOVAL OF FOOT FOREIGN BODY: CPT | Mod: RT

## 2023-05-18 PROCEDURE — 99233 SBSQ HOSP IP/OBS HIGH 50: CPT

## 2023-05-18 PROCEDURE — 88304 TISSUE EXAM BY PATHOLOGIST: CPT | Mod: 26

## 2023-05-18 RX ORDER — MORPHINE SULFATE 50 MG/1
2 CAPSULE, EXTENDED RELEASE ORAL
Refills: 0 | Status: DISCONTINUED | OUTPATIENT
Start: 2023-05-18 | End: 2023-05-18

## 2023-05-18 RX ORDER — PANTOPRAZOLE SODIUM 20 MG/1
40 TABLET, DELAYED RELEASE ORAL
Refills: 0 | Status: DISCONTINUED | OUTPATIENT
Start: 2023-05-18 | End: 2023-05-23

## 2023-05-18 RX ORDER — OFLOXACIN 0.3 %
1 DROPS OPHTHALMIC (EYE)
Refills: 0 | Status: DISCONTINUED | OUTPATIENT
Start: 2023-05-18 | End: 2023-05-18

## 2023-05-18 RX ORDER — DEXTROSE 50 % IN WATER 50 %
15 SYRINGE (ML) INTRAVENOUS ONCE
Refills: 0 | Status: DISCONTINUED | OUTPATIENT
Start: 2023-05-18 | End: 2023-05-18

## 2023-05-18 RX ORDER — DEXTROSE 50 % IN WATER 50 %
25 SYRINGE (ML) INTRAVENOUS ONCE
Refills: 0 | Status: DISCONTINUED | OUTPATIENT
Start: 2023-05-18 | End: 2023-05-18

## 2023-05-18 RX ORDER — ONDANSETRON 8 MG/1
4 TABLET, FILM COATED ORAL ONCE
Refills: 0 | Status: DISCONTINUED | OUTPATIENT
Start: 2023-05-18 | End: 2023-05-18

## 2023-05-18 RX ORDER — MORPHINE SULFATE 50 MG/1
4 CAPSULE, EXTENDED RELEASE ORAL
Refills: 0 | Status: DISCONTINUED | OUTPATIENT
Start: 2023-05-18 | End: 2023-05-18

## 2023-05-18 RX ORDER — BUDESONIDE AND FORMOTEROL FUMARATE DIHYDRATE 160; 4.5 UG/1; UG/1
2 AEROSOL RESPIRATORY (INHALATION)
Refills: 0 | Status: DISCONTINUED | OUTPATIENT
Start: 2023-05-18 | End: 2023-05-23

## 2023-05-18 RX ORDER — LACOSAMIDE 50 MG/1
100 TABLET ORAL
Refills: 0 | Status: DISCONTINUED | OUTPATIENT
Start: 2023-05-18 | End: 2023-05-23

## 2023-05-18 RX ORDER — INSULIN LISPRO 100/ML
VIAL (ML) SUBCUTANEOUS AT BEDTIME
Refills: 0 | Status: DISCONTINUED | OUTPATIENT
Start: 2023-05-18 | End: 2023-05-19

## 2023-05-18 RX ORDER — MEPERIDINE HYDROCHLORIDE 50 MG/ML
12.5 INJECTION INTRAMUSCULAR; INTRAVENOUS; SUBCUTANEOUS
Refills: 0 | Status: DISCONTINUED | OUTPATIENT
Start: 2023-05-18 | End: 2023-05-18

## 2023-05-18 RX ORDER — LANOLIN ALCOHOL/MO/W.PET/CERES
3 CREAM (GRAM) TOPICAL AT BEDTIME
Refills: 0 | Status: DISCONTINUED | OUTPATIENT
Start: 2023-05-18 | End: 2023-05-23

## 2023-05-18 RX ORDER — PREDNISOLONE SODIUM PHOSPHATE 1 %
1 DROPS OPHTHALMIC (EYE)
Refills: 0 | Status: DISCONTINUED | OUTPATIENT
Start: 2023-05-18 | End: 2023-05-23

## 2023-05-18 RX ORDER — SODIUM CHLORIDE 9 MG/ML
1000 INJECTION, SOLUTION INTRAVENOUS
Refills: 0 | Status: DISCONTINUED | OUTPATIENT
Start: 2023-05-18 | End: 2023-05-18

## 2023-05-18 RX ORDER — LEVETIRACETAM 250 MG/1
750 TABLET, FILM COATED ORAL
Refills: 0 | Status: DISCONTINUED | OUTPATIENT
Start: 2023-05-18 | End: 2023-05-23

## 2023-05-18 RX ORDER — OFLOXACIN 0.3 %
1 DROPS OPHTHALMIC (EYE)
Refills: 0 | Status: DISCONTINUED | OUTPATIENT
Start: 2023-05-18 | End: 2023-05-23

## 2023-05-18 RX ORDER — GLUCAGON INJECTION, SOLUTION 0.5 MG/.1ML
1 INJECTION, SOLUTION SUBCUTANEOUS ONCE
Refills: 0 | Status: DISCONTINUED | OUTPATIENT
Start: 2023-05-18 | End: 2023-05-18

## 2023-05-18 RX ORDER — METOPROLOL TARTRATE 50 MG
25 TABLET ORAL
Refills: 0 | Status: DISCONTINUED | OUTPATIENT
Start: 2023-05-18 | End: 2023-05-23

## 2023-05-18 RX ADMIN — Medication 1 DROP(S): at 11:14

## 2023-05-18 RX ADMIN — Medication 1 DROP(S): at 17:15

## 2023-05-18 RX ADMIN — Medication 25 MILLIGRAM(S): at 05:14

## 2023-05-18 RX ADMIN — Medication 1 DROP(S): at 00:05

## 2023-05-18 RX ADMIN — Medication 1 DROP(S): at 05:14

## 2023-05-18 RX ADMIN — Medication 1 DROP(S): at 23:51

## 2023-05-18 RX ADMIN — SODIUM CHLORIDE 50 MILLILITER(S): 9 INJECTION, SOLUTION INTRAVENOUS at 00:07

## 2023-05-18 RX ADMIN — Medication 25 MILLIGRAM(S): at 17:14

## 2023-05-18 RX ADMIN — LEVETIRACETAM 750 MILLIGRAM(S): 250 TABLET, FILM COATED ORAL at 05:14

## 2023-05-18 RX ADMIN — LEVETIRACETAM 750 MILLIGRAM(S): 250 TABLET, FILM COATED ORAL at 17:14

## 2023-05-18 RX ADMIN — LACOSAMIDE 100 MILLIGRAM(S): 50 TABLET ORAL at 05:14

## 2023-05-18 RX ADMIN — PANTOPRAZOLE SODIUM 40 MILLIGRAM(S): 20 TABLET, DELAYED RELEASE ORAL at 06:07

## 2023-05-18 RX ADMIN — LACOSAMIDE 100 MILLIGRAM(S): 50 TABLET ORAL at 17:14

## 2023-05-18 NOTE — BRIEF OPERATIVE NOTE - NSICDXBRIEFPOSTOP_GEN_ALL_CORE_FT
POST-OP DIAGNOSIS:  Diabetic foot ulcer 18-May-2023 09:13:11  Royal Bhatia  Foreign body in skin 18-May-2023 09:13:21  Royal Bhatia

## 2023-05-18 NOTE — PRE-OP CHECKLIST - WEIGHT IN KG
93.4
[FreeTextEntry1] : no issues/concerns\par eats well \par plays football and baseball\par +dentist \par nml urine/bm\par +social etoh, responsible\par +SA, + condom use

## 2023-05-18 NOTE — PROGRESS NOTE ADULT - ASSESSMENT
73M PMHx DM, hx of osteomyelitis s/p right TMA, bipolar depression, seizure disorder, HLD, HTN, and GERD presents from podiatry office for surgical debridement of R foot ulcer.    #Right Plantar Foot Ulcer s/p debridement  - s/p ceftriaxone and flagyl in ED  - s/p debridement today  - monitor off Abx, f/u OR cultures    # DM  - per last endo note only on PO  - f/u A1C   - ISS for now, titrate as needed    # szr  - c/w home keppra 750 bid    # bipolar   - c/w home lacosamide 100 bid    # GERD  - therapeutic exchange for home omeprazole     # HTN/HLD  - per pt not on statin   - f/u w/ pt if this is intentional or an oversight     DVT lovenox  diet dash/tlc/cc 73M PMHx DM, hx of osteomyelitis s/p right TMA, HTN, HLD, Asthma, bipolar depression, seizure disorder, and GERD presents from podiatry office for surgical debridement of R foot ulcer.    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  - s/p ceftriaxone and flagyl in ED  - s/p debridement today  - monitor off Abx as per ID  - f/u OR cultures    #DM  - A1C 7.3%  - ISS for now, adjust PRN  - monitor FS    #HTN  #HLD  - not on statin  - lipid profile wnl  - c/w Metoprolol 25mg BID  - adjust BP meds PRN    #Asthma  - not in exacerbation  - c/w inhalers    #Seizure Disorder  - c/w home Keppra 750mg BID  - c/w home lacosamide 100mg BID    #HO Bipolar Depression  - not on meds  - not in manic or depression state    #GERD  - therapeutic exchange for home omeprazole    #s/p R cataract surgery  - c/w Prednisolone 1% drops of right eye QID  - c/w ofloxacin 0.3%, 1 drop in right eye QID    #DVT ppx: lovenox  #GI ppx: PPI  #Diet: DASH/TLC/CC, NPO now for surgery  #Activity: AAT - PT  #Code Status: Full Code  #Dispo: home, acute  #Labs: CBC, BMP, Mag

## 2023-05-18 NOTE — PROGRESS NOTE ADULT - ATTENDING COMMENTS
72yo man pmhx DM 2, hx of osteomyelitis, gerd, bipolar depression, HLD, HTN, presents from podiatry office for surgical debridement of R foot ulcer.     a/p  # DFU, right foot.   s/p surgery today   cont abx  fu cultures     # DM , avoid hypoglycemia, decrease lantus, cont insulin per protocol   CAPILLARY BLOOD GLUCOSE  POCT Blood Glucose.: 134 mg/dL (18 May 2023 10:15)  POCT Blood Glucose.: 207 mg/dL (18 May 2023 05:33)  POCT Blood Glucose.: 136 mg/dL (17 May 2023 21:31)  POCT Blood Glucose.: 120 mg/dL (17 May 2023 17:05)    # GERD cont meds   # HTN,  uncontrolled, adjust meds , add bb and will reevaluate , will probably need 2 meds   # Hyperlipidemia cont meds  # status PPM, outpt fu   # seizure  - c/w home Keppra 750 bid  # bipolar   - c/w home lacosamide 100 bid  # eye disease? patient is on eye drops, cont     #Progress Note Handoff  Pending : podiatry followup regarding post op wound care   Family discussion: fidel pt   Disposition: home, 72yo man pmhx DM 2, hx of osteomyelitis, gerd, bipolar depression, HLD, HTN, presents from podiatry office for surgical debridement of R foot ulcer.     a/p  # DFU, right foot.   s/p surgery today   cont abx  fu cultures     # DM , avoid hypoglycemia, decrease lantus, cont insulin per protocol   CAPILLARY BLOOD GLUCOSE  POCT Blood Glucose.: 134 mg/dL (18 May 2023 10:15)  POCT Blood Glucose.: 207 mg/dL (18 May 2023 05:33)  POCT Blood Glucose.: 136 mg/dL (17 May 2023 21:31)  POCT Blood Glucose.: 120 mg/dL (17 May 2023 17:05)    # GERD cont meds   # HTN,  uncontrolled, adjust meds , add bb and will reevaluate , will probably need 2 meds   # Hyperlipidemia cont meds    # seizure  - c/w home Keppra 750 bid  # bipolar   - c/w home lacosamide 100 bid  # eye disease? patient is on eye drops, cont     #Progress Note Handoff  Pending : podiatry followup regarding post op wound care   Family discussion: fidel pt   Disposition: home,

## 2023-05-18 NOTE — PROGRESS NOTE ADULT - SUBJECTIVE AND OBJECTIVE BOX
SUBJECTIVE:    Patient is a 73y old Male who presents with a chief complaint of DFU    Currently admitted to medicine with the primary diagnosis of: DFU    Today is hospital day 2d.     Overnight Events:     no significant overnight events    PAST MEDICAL & SURGICAL HISTORY  DM (diabetes mellitus)  Type 2, 12/27/18 hgb aic  11.2    HTN (hypertension)    Dyslipidemia    Diabetic foot ulcer    Depression    GERD (gastroesophageal reflux disease)    Neuropathy, diabetic    Toe amputation status, right  (2008)    History of amputation of toe  LT -partial 1st toe      SOCIAL HISTORY:  no significant social hx    ALLERGIES:  No Known Allergies    MEDICATIONS:  STANDING MEDICATIONS  budesonide  80 MICROgram(s)/formoterol 4.5 MICROgram(s) Inhaler 2 Puff(s) Inhalation two times a day  dextrose 5%. 1000 milliLiter(s) IV Continuous <Continuous>  dextrose 50% Injectable 25 Gram(s) IV Push once  glucagon  Injectable 1 milliGRAM(s) IntraMuscular once  lacosamide 100 milliGRAM(s) Oral two times a day  lactated ringers. 1000 milliLiter(s) IV Continuous <Continuous>  levETIRAcetam 750 milliGRAM(s) Oral two times a day  metoprolol tartrate 25 milliGRAM(s) Oral two times a day  ofloxacin 0.3% Solution 1 Drop(s) Right EYE four times a day  pantoprazole    Tablet 40 milliGRAM(s) Oral before breakfast  prednisoLONE acetate 1% Suspension 1 Drop(s) Right EYE four times a day    PRN MEDICATIONS  aluminum hydroxide/magnesium hydroxide/simethicone Suspension 30 milliLiter(s) Oral every 4 hours PRN  dextrose Oral Gel 15 Gram(s) Oral once PRN  melatonin 3 milliGRAM(s) Oral at bedtime PRN  meperidine     Injectable 12.5 milliGRAM(s) IV Push every 10 minutes PRN  morphine  - Injectable 2 milliGRAM(s) IV Push every 10 minutes PRN  morphine  - Injectable 4 milliGRAM(s) IV Push every 10 minutes PRN  ondansetron Injectable 4 milliGRAM(s) IV Push once PRN    VITALS:   ICU Vital Signs Last 24 Hrs  T(C): 36.4 (18 May 2023 09:22), Max: 36.9 (17 May 2023 20:31)  T(F): 97.5 (18 May 2023 09:22), Max: 98.4 (17 May 2023 20:31)  HR: 59 (18 May 2023 09:25) (59 - 81)  BP: 139/75 (18 May 2023 09:35) (131/72 - 173/80)  RR: 19 (18 May 2023 09:35) (16 - 20)  SpO2: 98% (18 May 2023 09:35) (96% - 100%)    O2 Parameters below as of 18 May 2023 09:35  Patient On (Oxygen Delivery Method): room air        LABS:                        12.9   9.08  )-----------( 186      ( 17 May 2023 09:10 )             38.7     05-17    145  |  109  |  15  ----------------------------<  84  3.6   |  24  |  0.9    Ca    9.3      17 May 2023 09:10  Mg     1.8     05-17    TPro  6.6  /  Alb  4.2  /  TBili  0.8  /  DBili  x   /  AST  17  /  ALT  15  /  AlkPhos  84  05-16    PT/INR - ( 17 May 2023 09:10 )   PT: 12.00 sec;   INR: 1.05 ratio         PTT - ( 17 May 2023 09:10 )  PTT:31.5 sec      Culture - Blood (collected 16 May 2023 19:52)  Source: .Blood Blood  Preliminary Report (18 May 2023 02:02):    No growth to date.    Culture - Blood (collected 16 May 2023 19:52)  Source: .Blood Blood  Preliminary Report (18 May 2023 02:02):    No growth to date.      CARDIAC MARKERS ( 16 May 2023 19:52 )  x     / <0.01 ng/mL / x     / x     / x            RADIOLOGY:    < from: Xray Foot AP + Lateral + Oblique, Right (05.16.23 @ 20:07) >    ACC: [id number] EXAM:  XR FOOT COMP MIN 3 VIEWS RT   ORDERED BY: KILLIAN DEGROOT     PROCEDURE DATE:  05/16/2023          INTERPRETATION:  Clinical History / Reason for exam: Diabetic foot ulcer.    3 views of the right foot.    Findings/  impression: The transmetatarsal amputation is seen of all digits with   soft tissue swelling and postsurgical change.    Midfoot arthrosis is seen.    Vascular calcifications are recognized.    If concern with osteomyelitis, consider correlation with MR scanning..    --- End of Report ---            MP HAYES MD; Attending Interventional Radiologist  This document has been electronically signed. May 18 2023  8:48AM    < end of copied text >      PHYSICAL EXAM: unable to perform as patient was in OR with podiatry this AM  GEN:   LUNGS:   HEART:   ABD:   EXT:   NEURO:          SUBJECTIVE:    Patient is a 73y old Male who presents with a chief complaint of DFU    Currently admitted to medicine with the primary diagnosis of: DFU    Today is hospital day 2d.     Overnight Events:     no significant overnight events    PAST MEDICAL & SURGICAL HISTORY  DM (diabetes mellitus)  Type 2, 12/27/18 hgb aic  11.2  HTN (hypertension  Dyslipidemia  Diabetic foot ulcer  Depression  GERD (gastroesophageal reflux disease)  Neuropathy, diabetic  Toe amputation status, right (2008)  History of amputation of toe  LT -partial 1st toe    SOCIAL HISTORY:  no significant social hx    ALLERGIES:  No Known Allergies    MEDICATIONS:  STANDING MEDICATIONS  budesonide  80 MICROgram(s)/formoterol 4.5 MICROgram(s) Inhaler 2 Puff(s) Inhalation two times a day  dextrose 5%. 1000 milliLiter(s) IV Continuous <Continuous>  dextrose 50% Injectable 25 Gram(s) IV Push once  glucagon  Injectable 1 milliGRAM(s) IntraMuscular once  lacosamide 100 milliGRAM(s) Oral two times a day  lactated ringers. 1000 milliLiter(s) IV Continuous <Continuous>  levETIRAcetam 750 milliGRAM(s) Oral two times a day  metoprolol tartrate 25 milliGRAM(s) Oral two times a day  ofloxacin 0.3% Solution 1 Drop(s) Right EYE four times a day  pantoprazole    Tablet 40 milliGRAM(s) Oral before breakfast  prednisoLONE acetate 1% Suspension 1 Drop(s) Right EYE four times a day    PRN MEDICATIONS  aluminum hydroxide/magnesium hydroxide/simethicone Suspension 30 milliLiter(s) Oral every 4 hours PRN  dextrose Oral Gel 15 Gram(s) Oral once PRN  melatonin 3 milliGRAM(s) Oral at bedtime PRN  meperidine     Injectable 12.5 milliGRAM(s) IV Push every 10 minutes PRN  morphine  - Injectable 2 milliGRAM(s) IV Push every 10 minutes PRN  morphine  - Injectable 4 milliGRAM(s) IV Push every 10 minutes PRN  ondansetron Injectable 4 milliGRAM(s) IV Push once PRN    VITALS:   ICU Vital Signs Last 24 Hrs  T(C): 36.4 (18 May 2023 09:22), Max: 36.9 (17 May 2023 20:31)  T(F): 97.5 (18 May 2023 09:22), Max: 98.4 (17 May 2023 20:31)  HR: 59 (18 May 2023 09:25) (59 - 81)  BP: 139/75 (18 May 2023 09:35) (131/72 - 173/80)  RR: 19 (18 May 2023 09:35) (16 - 20)  SpO2: 98% (18 May 2023 09:35) (96% - 100%)    O2 Parameters below as of 18 May 2023 09:35  Patient On (Oxygen Delivery Method): room air        LABS:                        12.9   9.08  )-----------( 186      ( 17 May 2023 09:10 )             38.7     05-17    145  |  109  |  15  ----------------------------<  84  3.6   |  24  |  0.9    Ca    9.3      17 May 2023 09:10  Mg     1.8     05-17    TPro  6.6  /  Alb  4.2  /  TBili  0.8  /  DBili  x   /  AST  17  /  ALT  15  /  AlkPhos  84  05-16    PT/INR - ( 17 May 2023 09:10 )   PT: 12.00 sec;   INR: 1.05 ratio    PTT - ( 17 May 2023 09:10 )  PTT:31.5 sec    Culture - Blood (collected 16 May 2023 19:52)  Source: .Blood Blood  Preliminary Report (18 May 2023 02:02):    No growth to date.    Culture - Blood (collected 16 May 2023 19:52)  Source: .Blood Blood  Preliminary Report (18 May 2023 02:02):    No growth to date.    CARDIAC MARKERS ( 16 May 2023 19:52 )  x     / <0.01 ng/mL / x     / x     / x            RADIOLOGY:    < from: Xray Foot AP + Lateral + Oblique, Right (05.16.23 @ 20:07) >    ACC: [id number] EXAM:  XR FOOT COMP MIN 3 VIEWS RT   ORDERED BY: KILLIAN DEGROOT     PROCEDURE DATE:  05/16/2023      INTERPRETATION:  Clinical History / Reason for exam: Diabetic foot ulcer.    3 views of the right foot.    Findings/  impression: The transmetatarsal amputation is seen of all digits with   soft tissue swelling and postsurgical change.    Midfoot arthrosis is seen.    Vascular calcifications are recognized.    If concern with osteomyelitis, consider correlation with MR scanning..    --- End of Report ---      MP HAYES MD; Attending Interventional Radiologist  This document has been electronically signed. May 18 2023  8:48AM    < end of copied text >      PHYSICAL EXAM: unable to perform as patient was in OR with podiatry this AM  GEN:   LUNGS:   HEART:   ABD:   EXT:   NEURO:

## 2023-05-18 NOTE — BRIEF OPERATIVE NOTE - NSICDXBRIEFPREOP_GEN_ALL_CORE_FT
PRE-OP DIAGNOSIS:  Diabetic foot ulcer 18-May-2023 09:12:52  Royal Bhatia  Foreign body in skin 18-May-2023 09:13:01  Royal Bhatia

## 2023-05-18 NOTE — BRIEF OPERATIVE NOTE - NSICDXBRIEFPROCEDURE_GEN_ALL_CORE_FT
PROCEDURES:  Debridement of muscle or fascia 18-May-2023 09:12:19  Royal Bhatia  Complex removal of foreign body 18-May-2023 09:12:41  Royal Bhatia

## 2023-05-19 DIAGNOSIS — L97.519 NON-PRESSURE CHRONIC ULCER OF OTHER PART OF RIGHT FOOT WITH UNSPECIFIED SEVERITY: ICD-10-CM

## 2023-05-19 DIAGNOSIS — L03.115 CELLULITIS OF RIGHT LOWER LIMB: ICD-10-CM

## 2023-05-19 LAB
ANION GAP SERPL CALC-SCNC: 12 MMOL/L — SIGNIFICANT CHANGE UP (ref 7–14)
BUN SERPL-MCNC: 24 MG/DL — HIGH (ref 10–20)
CALCIUM SERPL-MCNC: 8.6 MG/DL — SIGNIFICANT CHANGE UP (ref 8.4–10.5)
CHLORIDE SERPL-SCNC: 106 MMOL/L — SIGNIFICANT CHANGE UP (ref 98–110)
CO2 SERPL-SCNC: 22 MMOL/L — SIGNIFICANT CHANGE UP (ref 17–32)
CREAT SERPL-MCNC: 1.2 MG/DL — SIGNIFICANT CHANGE UP (ref 0.7–1.5)
EGFR: 63 ML/MIN/1.73M2 — SIGNIFICANT CHANGE UP
GLUCOSE BLDC GLUCOMTR-MCNC: 162 MG/DL — HIGH (ref 70–99)
GLUCOSE BLDC GLUCOMTR-MCNC: 224 MG/DL — HIGH (ref 70–99)
GLUCOSE BLDC GLUCOMTR-MCNC: 263 MG/DL — HIGH (ref 70–99)
GLUCOSE BLDC GLUCOMTR-MCNC: 299 MG/DL — HIGH (ref 70–99)
GLUCOSE BLDC GLUCOMTR-MCNC: 363 MG/DL — HIGH (ref 70–99)
GLUCOSE SERPL-MCNC: 252 MG/DL — HIGH (ref 70–99)
HCT VFR BLD CALC: 38 % — LOW (ref 42–52)
HGB BLD-MCNC: 12.5 G/DL — LOW (ref 14–18)
MAGNESIUM SERPL-MCNC: 2.1 MG/DL — SIGNIFICANT CHANGE UP (ref 1.8–2.4)
MCHC RBC-ENTMCNC: 29.6 PG — SIGNIFICANT CHANGE UP (ref 27–31)
MCHC RBC-ENTMCNC: 32.9 G/DL — SIGNIFICANT CHANGE UP (ref 32–37)
MCV RBC AUTO: 89.8 FL — SIGNIFICANT CHANGE UP (ref 80–94)
NRBC # BLD: 0 /100 WBCS — SIGNIFICANT CHANGE UP (ref 0–0)
PLATELET # BLD AUTO: 226 K/UL — SIGNIFICANT CHANGE UP (ref 130–400)
PMV BLD: 10.9 FL — HIGH (ref 7.4–10.4)
POTASSIUM SERPL-MCNC: 4.4 MMOL/L — SIGNIFICANT CHANGE UP (ref 3.5–5)
POTASSIUM SERPL-SCNC: 4.4 MMOL/L — SIGNIFICANT CHANGE UP (ref 3.5–5)
RBC # BLD: 4.23 M/UL — LOW (ref 4.7–6.1)
RBC # FLD: 12.8 % — SIGNIFICANT CHANGE UP (ref 11.5–14.5)
SODIUM SERPL-SCNC: 140 MMOL/L — SIGNIFICANT CHANGE UP (ref 135–146)
SURGICAL PATHOLOGY STUDY: SIGNIFICANT CHANGE UP
WBC # BLD: 7.05 K/UL — SIGNIFICANT CHANGE UP (ref 4.8–10.8)
WBC # FLD AUTO: 7.05 K/UL — SIGNIFICANT CHANGE UP (ref 4.8–10.8)

## 2023-05-19 PROCEDURE — 99232 SBSQ HOSP IP/OBS MODERATE 35: CPT

## 2023-05-19 RX ORDER — SODIUM CHLORIDE 9 MG/ML
1000 INJECTION, SOLUTION INTRAVENOUS
Refills: 0 | Status: DISCONTINUED | OUTPATIENT
Start: 2023-05-19 | End: 2023-05-23

## 2023-05-19 RX ORDER — INSULIN LISPRO 100/ML
4 VIAL (ML) SUBCUTANEOUS ONCE
Refills: 0 | Status: COMPLETED | OUTPATIENT
Start: 2023-05-19 | End: 2023-05-19

## 2023-05-19 RX ORDER — DEXTROSE 50 % IN WATER 50 %
25 SYRINGE (ML) INTRAVENOUS ONCE
Refills: 0 | Status: DISCONTINUED | OUTPATIENT
Start: 2023-05-19 | End: 2023-05-23

## 2023-05-19 RX ORDER — ACETAMINOPHEN 500 MG
650 TABLET ORAL EVERY 6 HOURS
Refills: 0 | Status: DISCONTINUED | OUTPATIENT
Start: 2023-05-19 | End: 2023-05-23

## 2023-05-19 RX ORDER — HYDRALAZINE HCL 50 MG
25 TABLET ORAL EVERY 6 HOURS
Refills: 0 | Status: DISCONTINUED | OUTPATIENT
Start: 2023-05-19 | End: 2023-05-23

## 2023-05-19 RX ORDER — DEXTROSE 50 % IN WATER 50 %
15 SYRINGE (ML) INTRAVENOUS ONCE
Refills: 0 | Status: DISCONTINUED | OUTPATIENT
Start: 2023-05-19 | End: 2023-05-23

## 2023-05-19 RX ORDER — ENOXAPARIN SODIUM 100 MG/ML
40 INJECTION SUBCUTANEOUS EVERY 24 HOURS
Refills: 0 | Status: DISCONTINUED | OUTPATIENT
Start: 2023-05-19 | End: 2023-05-23

## 2023-05-19 RX ORDER — DEXTROSE 50 % IN WATER 50 %
12.5 SYRINGE (ML) INTRAVENOUS ONCE
Refills: 0 | Status: DISCONTINUED | OUTPATIENT
Start: 2023-05-19 | End: 2023-05-23

## 2023-05-19 RX ORDER — GLUCAGON INJECTION, SOLUTION 0.5 MG/.1ML
1 INJECTION, SOLUTION SUBCUTANEOUS ONCE
Refills: 0 | Status: DISCONTINUED | OUTPATIENT
Start: 2023-05-19 | End: 2023-05-23

## 2023-05-19 RX ORDER — INSULIN LISPRO 100/ML
VIAL (ML) SUBCUTANEOUS
Refills: 0 | Status: DISCONTINUED | OUTPATIENT
Start: 2023-05-19 | End: 2023-05-23

## 2023-05-19 RX ADMIN — Medication 1 DROP(S): at 05:31

## 2023-05-19 RX ADMIN — LACOSAMIDE 100 MILLIGRAM(S): 50 TABLET ORAL at 17:38

## 2023-05-19 RX ADMIN — PANTOPRAZOLE SODIUM 40 MILLIGRAM(S): 20 TABLET, DELAYED RELEASE ORAL at 06:26

## 2023-05-19 RX ADMIN — Medication 25 MILLIGRAM(S): at 17:38

## 2023-05-19 RX ADMIN — LACOSAMIDE 100 MILLIGRAM(S): 50 TABLET ORAL at 05:32

## 2023-05-19 RX ADMIN — ENOXAPARIN SODIUM 40 MILLIGRAM(S): 100 INJECTION SUBCUTANEOUS at 21:22

## 2023-05-19 RX ADMIN — Medication 1 DROP(S): at 17:39

## 2023-05-19 RX ADMIN — Medication 1 DROP(S): at 11:32

## 2023-05-19 RX ADMIN — BUDESONIDE AND FORMOTEROL FUMARATE DIHYDRATE 2 PUFF(S): 160; 4.5 AEROSOL RESPIRATORY (INHALATION) at 08:53

## 2023-05-19 RX ADMIN — LEVETIRACETAM 750 MILLIGRAM(S): 250 TABLET, FILM COATED ORAL at 05:32

## 2023-05-19 RX ADMIN — Medication 1 DROP(S): at 23:48

## 2023-05-19 RX ADMIN — Medication 3: at 16:56

## 2023-05-19 RX ADMIN — Medication 4 UNIT(S): at 15:28

## 2023-05-19 RX ADMIN — LEVETIRACETAM 750 MILLIGRAM(S): 250 TABLET, FILM COATED ORAL at 17:38

## 2023-05-19 RX ADMIN — Medication 25 MILLIGRAM(S): at 05:32

## 2023-05-19 NOTE — PROGRESS NOTE ADULT - SUBJECTIVE AND OBJECTIVE BOX
Podiatry Progress Note    Subjective:  JOSÉ MANUEL PORTER is a  73y Male.   Seen bedside.   Patient is a 73y old  Male who presents with a chief complaint of DFU (19 May 2023 08:44)      Past Medical History and Surgical History  PAST MEDICAL & SURGICAL HISTORY:  DM (diabetes mellitus)  Type 2, 12/27/18 hgb aic  11.2      HTN (hypertension)      Dyslipidemia      Diabetic foot ulcer      Depression      GERD (gastroesophageal reflux disease)      Neuropathy, diabetic      Toe amputation status, right  (2008)      History of amputation of toe  LT -partial 1st toe           Objective:  Vital Signs Last 24 Hrs  T(C): 36.4 (19 May 2023 05:00), Max: 36.7 (18 May 2023 19:25)  T(F): 97.5 (19 May 2023 05:00), Max: 98 (18 May 2023 19:25)  HR: 75 (19 May 2023 05:00) (66 - 75)  BP: 129/68 (19 May 2023 05:00) (129/68 - 149/75)  BP(mean): --  RR: 18 (19 May 2023 05:00) (18 - 18)  SpO2: 98% (19 May 2023 08:00) (98% - 99%)    Parameters below as of 19 May 2023 08:00  Patient On (Oxygen Delivery Method): room air                            12.5   7.05  )-----------( 226      ( 19 May 2023 06:40 )             38.0                 05-19    140  |  106  |  24<H>  ----------------------------<  252<H>  4.4   |  22  |  1.2    Ca    8.6      19 May 2023 06:40  Mg     2.1     05-19          Physical Exam - Right Lower Extremity Focused:   Derm: Plantar right foot ulcer with granular tissue, deep to bone, no purulence or malodor noted, no necrosis, no active drainage  Vascular: DP and PT Pulses Diminished; Foot is Warm to Warm to the touch; Capillary Refill Time < 3 Seconds;    Neuro: Protective Sensation Diminished / Moderately Neuropathic   MSK: Pain On Palpation at Wound Site     Assessment:  -s/p excisional debridement with foreign body removal right foot    Plan:  Chart reviewed and Patient evaluated. All Questions and Concerns Addressed and Answered  Local Wound Care; Wound Flushed w/ NS; Wound Packed w/ iodoform packing / DSD / Kerlix / ACE   Weight Bearing Status; WBAT w/ Heel Touch w/ Surgical Shoe;   Continue w/ Local Wound Care; Q24 Dressing Changes;  Will need home VAC for d/c, paperwork will be completed by podiatry  No Further Sx Debridement;   Patient Stable Per Podiatry Standpoint; Follow Up as an Outpatient w/ Dr. Bhatia @ [address] [place] [address]  Discussed Plan w/ Attending; Dr. Bhatia    Podiatry

## 2023-05-19 NOTE — PROGRESS NOTE ADULT - SUBJECTIVE AND OBJECTIVE BOX
SUBJECTIVE:    Patient is a 73y old Male who presents with a chief complaint of DFU (18 May 2023 09:43)    Currently admitted to medicine with the primary diagnosis of:    Today is hospital day 3d.     Overnight Events:         PAST MEDICAL & SURGICAL HISTORY  DM (diabetes mellitus)  Type 2, 12/27/18 hgb aic  11.2    HTN (hypertension)    Dyslipidemia    Diabetic foot ulcer    Depression    GERD (gastroesophageal reflux disease)    Neuropathy, diabetic    Toe amputation status, right  (2008)    History of amputation of toe  LT -partial 1st toe        SOCIAL HISTORY:  Smoking history:  Alcohol Use;  Illicit Drug Use:    ALLERGIES:  No Known Allergies    MEDICATIONS:  STANDING MEDICATIONS  budesonide  80 MICROgram(s)/formoterol 4.5 MICROgram(s) Inhaler 2 Puff(s) Inhalation two times a day  insulin lispro (ADMELOG) corrective regimen sliding scale   SubCutaneous at bedtime  lacosamide 100 milliGRAM(s) Oral two times a day  levETIRAcetam 750 milliGRAM(s) Oral two times a day  metoprolol tartrate 25 milliGRAM(s) Oral two times a day  ofloxacin 0.3% Solution 1 Drop(s) Right EYE four times a day  pantoprazole    Tablet 40 milliGRAM(s) Oral before breakfast  prednisoLONE acetate 1% Suspension 1 Drop(s) Right EYE four times a day    PRN MEDICATIONS  melatonin 3 milliGRAM(s) Oral at bedtime PRN    VITALS:   ICU Vital Signs Last 24 Hrs  T(C): 36.4 (19 May 2023 05:00), Max: 36.7 (18 May 2023 19:25)  T(F): 97.5 (19 May 2023 05:00), Max: 98 (18 May 2023 19:25)  HR: 75 (19 May 2023 05:00) (59 - 75)  BP: 129/68 (19 May 2023 05:00) (129/68 - 149/75)  BP(mean): --  ABP: --  ABP(mean): --  RR: 18 (19 May 2023 05:00) (17 - 20)  SpO2: 99% (18 May 2023 19:25) (98% - 99%)    O2 Parameters below as of 18 May 2023 09:50  Patient On (Oxygen Delivery Method): room air            LABS:                        12.5   7.05  )-----------( 226      ( 19 May 2023 06:40 )             38.0     05-19    140  |  106  |  24<H>  ----------------------------<  252<H>  4.4   |  22  |  1.2    Ca    8.6      19 May 2023 06:40  Mg     2.1     05-19      PT/INR - ( 17 May 2023 09:10 )   PT: 12.00 sec;   INR: 1.05 ratio         PTT - ( 17 May 2023 09:10 )  PTT:31.5 sec          Culture - Blood (collected 16 May 2023 19:52)  Source: .Blood Blood  Preliminary Report (18 May 2023 02:02):    No growth to date.    Culture - Blood (collected 16 May 2023 19:52)  Source: .Blood Blood  Preliminary Report (18 May 2023 02:02):    No growth to date.            RADIOLOGY:    PHYSICAL EXAM:  GEN:   LUNGS:   HEART:   ABD:   EXT:   NEURO:     Mobility (6 Click Score):      /24    Lines:  Central line:              Date placed:             Indication:   Intravenous Access:   NG tube:   Basilio Catheter:          SUBJECTIVE:    Patient is a 73y old Male who presents with a chief complaint of DFU (18 May 2023 09:43)    Currently admitted to medicine with the primary diagnosis of: DFU    Today is hospital day 3d.     Overnight Events:     no significant overnight events    PAST MEDICAL & SURGICAL HISTORY  DM (diabetes mellitus)  Type 2, 12/27/18 hgb aic  11.2    HTN (hypertension)    Dyslipidemia    Diabetic foot ulcer    Depression    GERD (gastroesophageal reflux disease)    Neuropathy, diabetic    Toe amputation status, right  (2008)    History of amputation of toe  LT -partial 1st toe      SOCIAL HISTORY:  no significant social hx    ALLERGIES:  No Known Allergies    MEDICATIONS:  STANDING MEDICATIONS  budesonide  80 MICROgram(s)/formoterol 4.5 MICROgram(s) Inhaler 2 Puff(s) Inhalation two times a day  insulin lispro (ADMELOG) corrective regimen sliding scale   SubCutaneous at bedtime  lacosamide 100 milliGRAM(s) Oral two times a day  levETIRAcetam 750 milliGRAM(s) Oral two times a day  metoprolol tartrate 25 milliGRAM(s) Oral two times a day  ofloxacin 0.3% Solution 1 Drop(s) Right EYE four times a day  pantoprazole    Tablet 40 milliGRAM(s) Oral before breakfast  prednisoLONE acetate 1% Suspension 1 Drop(s) Right EYE four times a day    PRN MEDICATIONS  melatonin 3 milliGRAM(s) Oral at bedtime PRN    VITALS:   ICU Vital Signs Last 24 Hrs  T(C): 36.4 (19 May 2023 05:00), Max: 36.7 (18 May 2023 19:25)  T(F): 97.5 (19 May 2023 05:00), Max: 98 (18 May 2023 19:25)  HR: 75 (19 May 2023 05:00) (59 - 75)  BP: 129/68 (19 May 2023 05:00) (129/68 - 149/75)  RR: 18 (19 May 2023 05:00) (17 - 20)  SpO2: 99% (18 May 2023 19:25) (98% - 99%)    O2 Parameters below as of 18 May 2023 09:50  Patient On (Oxygen Delivery Method): room air      LABS:                        12.5   7.05  )-----------( 226      ( 19 May 2023 06:40 )             38.0     05-19    140  |  106  |  24<H>  ----------------------------<  252<H>  4.4   |  22  |  1.2    Ca    8.6      19 May 2023 06:40  Mg     2.1     05-19      PT/INR - ( 17 May 2023 09:10 )   PT: 12.00 sec;   INR: 1.05 ratio         PTT - ( 17 May 2023 09:10 )  PTT:31.5 sec        Culture - Blood (collected 16 May 2023 19:52)  Source: .Blood Blood  Preliminary Report (18 May 2023 02:02):    No growth to date.    Culture - Blood (collected 16 May 2023 19:52)  Source: .Blood Blood  Preliminary Report (18 May 2023 02:02):    No growth to date.      RADIOLOGY:    < from: Xray Foot AP + Lateral + Oblique, Right (05.18.23 @ 10:58) >    ACC: [id number] EXAM:  XR FOOT COMP MIN 3 VIEWS RT   ORDERED BY: EDGAR HALL     PROCEDURE DATE:  05/18/2023      INTERPRETATION:  INDICATION: Postoperative assessment    COMPARISON: Right foot radiographs dated 5/16/2023.    TECHNIQUE: 3 views of the right foot.    FINDINGS/  IMPRESSION:    The patient is again status post transmetatarsal amputation of all rays.    Compared to 5/16/2023 there has been interval removal of 4 out of 5   staples in the medial forefoot.    There is no definiteosseous erosion/osteomyelitis.    --- End of Report ---      PHYSICAL EXAM:  GEN: in NAD  LUNGS: clear to ascultation b/l, no wheezing  HEART: normal rate and rhythm  ABD: soft, nontender, nondistended, +BS, no guarding  EXT: no edema in b/l LEs, right foot wrapped in ace bandage  NEURO: A&Ox3

## 2023-05-19 NOTE — PROGRESS NOTE ADULT - ATTENDING COMMENTS
HPI:  72yo man pmhx DM 2, hx of osteomyelitis, gerd, bipolar depression, HLD, HTN, presents from podiatry office for surgical debridement of R foot ulcer.     S: no right foot pain, denies fever    PE:  EXT: Right foot stump wrapped in ace bandages.     Assessment /Plan   #Right Plantar Diabetic Foot Ulcer s/p debridement, hx/o R TMA     - BCx - NGTD  - monitor off Abx as per ID  - pending deep wound cx on 5/18/23.   - PT following, heel touch    #HTN, elevated ?2/2 pain   #HLD  - not on statin  - lipid profile wnl  - c/w Metoprolol 25mg BID, added prn tylenol, and prn hydralazine.   - adjust BP meds PRN    #DMT2  - A1C 7.3%  - ISS for now, adjust PRN  - monitor FS  - hold home diabetic meds    #Mild persistent Asthma  - no exac.   - prn inhalers    #Seizure Disorder  - c/w home Keppra 750mg BID  - c/w home lacosamide 100mg BID  - no seizure activity noted.     #HO Bipolar Depression  - not on meds  - not in manic or depression state    #GERD  - therapeutic exchange for home omeprazole    #hx/o R cataract surgery  - c/w Prednisolone 1% drops of right eye QID  - c/w ofloxacin 0.3%, 1 drop in right eye QID    #DVT ppx: lovenox  #GI ppx: PPI  #Diet: DASH/TLC/CC  #Activity: AAT - PT  #Code Status: Full Code

## 2023-05-19 NOTE — PROGRESS NOTE ADULT - ASSESSMENT
73M PMHx DM, hx of osteomyelitis s/p right TMA, HTN, HLD, Asthma, bipolar depression, seizure disorder, and GERD presents from podiatry office for surgical debridement of R foot ulcer.    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  - s/p ceftriaxone and flagyl in ED  - s/p debridement today  - BCx - NGTD  - monitor off Abx as per ID  - f/u OR cultures    #DM  - A1C 7.3%  - ISS for now, adjust PRN  - monitor FS    #HTN  #HLD  - not on statin  - lipid profile wnl  - c/w Metoprolol 25mg BID  - adjust BP meds PRN    #Asthma  - not in exacerbation  - c/w inhalers    #Seizure Disorder  - c/w home Keppra 750mg BID  - c/w home lacosamide 100mg BID    #HO Bipolar Depression  - not on meds  - not in manic or depression state    #GERD  - therapeutic exchange for home omeprazole    #s/p R cataract surgery  - c/w Prednisolone 1% drops of right eye QID  - c/w ofloxacin 0.3%, 1 drop in right eye QID    #DVT ppx: lovenox  #GI ppx: PPI  #Diet: DASH/TLC/CC, NPO now for surgery  #Activity: AAT - PT  #Code Status: Full Code  #Dispo: home, acute  #Labs: CBC, BMP, Mag 73M PMHx DM, hx of osteomyelitis s/p right TMA, HTN, HLD, Asthma, bipolar depression, seizure disorder, and GERD presents from podiatry office for surgical debridement of R foot ulcer.    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  - s/p ceftriaxone and flagyl in ED  - s/p debridement today  - BCx - NGTD  - monitor off Abx as per ID  - f/u OR cultures  - PT following, heel touch    #DM  - A1C 7.3%  - ISS for now, adjust PRN  - monitor FS    #HTN  #HLD  - not on statin  - lipid profile wnl  - c/w Metoprolol 25mg BID  - adjust BP meds PRN    #Asthma  - not in exacerbation  - c/w inhalers    #Seizure Disorder  - c/w home Keppra 750mg BID  - c/w home lacosamide 100mg BID    #HO Bipolar Depression  - not on meds  - not in manic or depression state    #GERD  - therapeutic exchange for home omeprazole    #s/p R cataract surgery  - c/w Prednisolone 1% drops of right eye QID  - c/w ofloxacin 0.3%, 1 drop in right eye QID    #DVT ppx: lovenox  #GI ppx: PPI  #Diet: DASH/TLC/CC  #Activity: AAT - PT  #Code Status: Full Code  #Dispo: home, acute  #Labs: BMP Home Medications:  Actos 15 mg oral tablet: 1 orally once a day (17 May 2023 02:32)  Jardiance 25 mg oral tablet: 1 orally once a day (17 May 2023 02:32)  levETIRAcetam 750 mg oral tablet: 1 orally 2 times a day (17 May 2023 02:32)  ofloxacin 0.3% ophthalmic solution: 1 in the right eye 4 times a day (17 May 2023 02:32)  prednisoLONE sodium phosphate 1% ophthalmic solution: 1 in the right eye 4 times a day (17 May 2023 02:32)  Trulicity Pen 3 mg/0.5 mL subcutaneous solution: 3 subcutaneously every 7 days (17 May 2023 02:31)    MEDICATIONS  (STANDING):  budesonide  80 MICROgram(s)/formoterol 4.5 MICROgram(s) Inhaler 2 Puff(s) Inhalation two times a day  dextrose 5%. 1000 milliLiter(s) (50 mL/Hr) IV Continuous <Continuous>  dextrose 5%. 1000 milliLiter(s) (100 mL/Hr) IV Continuous <Continuous>  dextrose 50% Injectable 25 Gram(s) IV Push once  dextrose 50% Injectable 12.5 Gram(s) IV Push once  dextrose 50% Injectable 25 Gram(s) IV Push once  glucagon  Injectable 1 milliGRAM(s) IntraMuscular once  insulin lispro (ADMELOG) corrective regimen sliding scale   SubCutaneous three times a day before meals  lacosamide 100 milliGRAM(s) Oral two times a day  levETIRAcetam 750 milliGRAM(s) Oral two times a day  metoprolol tartrate 25 milliGRAM(s) Oral two times a day  ofloxacin 0.3% Solution 1 Drop(s) Right EYE four times a day  pantoprazole    Tablet 40 milliGRAM(s) Oral before breakfast  prednisoLONE acetate 1% Suspension 1 Drop(s) Right EYE four times a day    MEDICATIONS  (PRN):  dextrose Oral Gel 15 Gram(s) Oral once PRN Blood Glucose LESS THAN 70 milliGRAM(s)/deciliter  melatonin 3 milliGRAM(s) Oral at bedtime PRN Insomnia      73M PMHx DM, hx of osteomyelitis s/p right TMA, HTN, HLD, Asthma, bipolar depression, seizure disorder, and GERD presents from podiatry office for surgical debridement of R foot ulcer.    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  - s/p ceftriaxone and flagyl in ED  - s/p debridement today  - BCx - NGTD  - monitor off Abx as per ID  - f/u  OR cultures  - PT following, heel touch    #DMT2  - A1C 7.3%  - ISS for now, adjust PRN  - monitor FS    #HTN  #HLD  - not on statin  - lipid profile wnl  - c/w Metoprolol 25mg BID  - adjust BP meds PRN    #Asthma  - not in exacerbation  - c/w inhalers    #Seizure Disorder  - c/w home Keppra 750mg BID  - c/w home lacosamide 100mg BID    #HO Bipolar Depression  - not on meds  - not in manic or depression state    #GERD  - therapeutic exchange for home omeprazole    #s/p R cataract surgery  - c/w Prednisolone 1% drops of right eye QID  - c/w ofloxacin 0.3%, 1 drop in right eye QID    #DVT ppx: lovenox  #GI ppx: PPI  #Diet: DASH/TLC/CC  #Activity: AAT - PT  #Code Status: Full Code  #Dispo: home, acute  #Labs: BMP

## 2023-05-20 LAB
-  AMIKACIN: SIGNIFICANT CHANGE UP
-  AMOXICILLIN/CLAVULANIC ACID: SIGNIFICANT CHANGE UP
-  AMPICILLIN/SULBACTAM: SIGNIFICANT CHANGE UP
-  AMPICILLIN: SIGNIFICANT CHANGE UP
-  AZTREONAM: SIGNIFICANT CHANGE UP
-  CEFAZOLIN: SIGNIFICANT CHANGE UP
-  CEFEPIME: SIGNIFICANT CHANGE UP
-  CEFOXITIN: SIGNIFICANT CHANGE UP
-  CEFTRIAXONE: SIGNIFICANT CHANGE UP
-  CIPROFLOXACIN: SIGNIFICANT CHANGE UP
-  ERTAPENEM: SIGNIFICANT CHANGE UP
-  GENTAMICIN: SIGNIFICANT CHANGE UP
-  IMIPENEM: SIGNIFICANT CHANGE UP
-  LEVOFLOXACIN: SIGNIFICANT CHANGE UP
-  MEROPENEM: SIGNIFICANT CHANGE UP
-  PIPERACILLIN/TAZOBACTAM: SIGNIFICANT CHANGE UP
-  TOBRAMYCIN: SIGNIFICANT CHANGE UP
-  TRIMETHOPRIM/SULFAMETHOXAZOLE: SIGNIFICANT CHANGE UP
ANION GAP SERPL CALC-SCNC: 12 MMOL/L — SIGNIFICANT CHANGE UP (ref 7–14)
BUN SERPL-MCNC: 21 MG/DL — HIGH (ref 10–20)
CALCIUM SERPL-MCNC: 9.1 MG/DL — SIGNIFICANT CHANGE UP (ref 8.4–10.5)
CHLORIDE SERPL-SCNC: 109 MMOL/L — SIGNIFICANT CHANGE UP (ref 98–110)
CO2 SERPL-SCNC: 21 MMOL/L — SIGNIFICANT CHANGE UP (ref 17–32)
CREAT SERPL-MCNC: 1.1 MG/DL — SIGNIFICANT CHANGE UP (ref 0.7–1.5)
CULTURE RESULTS: SIGNIFICANT CHANGE UP
EGFR: 70 ML/MIN/1.73M2 — SIGNIFICANT CHANGE UP
GLUCOSE BLDC GLUCOMTR-MCNC: 205 MG/DL — HIGH (ref 70–99)
GLUCOSE BLDC GLUCOMTR-MCNC: 212 MG/DL — HIGH (ref 70–99)
GLUCOSE BLDC GLUCOMTR-MCNC: 318 MG/DL — HIGH (ref 70–99)
GLUCOSE BLDC GLUCOMTR-MCNC: 345 MG/DL — HIGH (ref 70–99)
GLUCOSE SERPL-MCNC: 256 MG/DL — HIGH (ref 70–99)
METHOD TYPE: SIGNIFICANT CHANGE UP
ORGANISM # SPEC MICROSCOPIC CNT: SIGNIFICANT CHANGE UP
ORGANISM # SPEC MICROSCOPIC CNT: SIGNIFICANT CHANGE UP
POTASSIUM SERPL-MCNC: 4.8 MMOL/L — SIGNIFICANT CHANGE UP (ref 3.5–5)
POTASSIUM SERPL-SCNC: 4.8 MMOL/L — SIGNIFICANT CHANGE UP (ref 3.5–5)
SODIUM SERPL-SCNC: 142 MMOL/L — SIGNIFICANT CHANGE UP (ref 135–146)
SPECIMEN SOURCE: SIGNIFICANT CHANGE UP

## 2023-05-20 PROCEDURE — 99233 SBSQ HOSP IP/OBS HIGH 50: CPT

## 2023-05-20 RX ORDER — INSULIN GLARGINE 100 [IU]/ML
10 INJECTION, SOLUTION SUBCUTANEOUS AT BEDTIME
Refills: 0 | Status: DISCONTINUED | OUTPATIENT
Start: 2023-05-20 | End: 2023-05-23

## 2023-05-20 RX ORDER — INSULIN LISPRO 100/ML
3 VIAL (ML) SUBCUTANEOUS
Refills: 0 | Status: DISCONTINUED | OUTPATIENT
Start: 2023-05-20 | End: 2023-05-23

## 2023-05-20 RX ORDER — INSULIN LISPRO 100/ML
2 VIAL (ML) SUBCUTANEOUS ONCE
Refills: 0 | Status: COMPLETED | OUTPATIENT
Start: 2023-05-20 | End: 2023-05-20

## 2023-05-20 RX ADMIN — Medication 3 UNIT(S): at 12:24

## 2023-05-20 RX ADMIN — Medication 2: at 17:26

## 2023-05-20 RX ADMIN — Medication 25 MILLIGRAM(S): at 06:05

## 2023-05-20 RX ADMIN — PANTOPRAZOLE SODIUM 40 MILLIGRAM(S): 20 TABLET, DELAYED RELEASE ORAL at 06:04

## 2023-05-20 RX ADMIN — LEVETIRACETAM 750 MILLIGRAM(S): 250 TABLET, FILM COATED ORAL at 06:05

## 2023-05-20 RX ADMIN — Medication 2 UNIT(S): at 23:03

## 2023-05-20 RX ADMIN — ENOXAPARIN SODIUM 40 MILLIGRAM(S): 100 INJECTION SUBCUTANEOUS at 21:55

## 2023-05-20 RX ADMIN — INSULIN GLARGINE 10 UNIT(S): 100 INJECTION, SOLUTION SUBCUTANEOUS at 21:54

## 2023-05-20 RX ADMIN — Medication 1 DROP(S): at 06:05

## 2023-05-20 RX ADMIN — LEVETIRACETAM 750 MILLIGRAM(S): 250 TABLET, FILM COATED ORAL at 18:44

## 2023-05-20 RX ADMIN — Medication 4: at 12:23

## 2023-05-20 RX ADMIN — Medication 4: at 08:22

## 2023-05-20 RX ADMIN — Medication 650 MILLIGRAM(S): at 12:27

## 2023-05-20 RX ADMIN — Medication 1 DROP(S): at 18:44

## 2023-05-20 RX ADMIN — Medication 1 DROP(S): at 18:46

## 2023-05-20 RX ADMIN — LACOSAMIDE 100 MILLIGRAM(S): 50 TABLET ORAL at 06:04

## 2023-05-20 RX ADMIN — Medication 25 MILLIGRAM(S): at 18:44

## 2023-05-20 RX ADMIN — Medication 1 TABLET(S): at 21:55

## 2023-05-20 RX ADMIN — Medication 3 UNIT(S): at 17:26

## 2023-05-20 RX ADMIN — LACOSAMIDE 100 MILLIGRAM(S): 50 TABLET ORAL at 18:44

## 2023-05-20 RX ADMIN — Medication 1 DROP(S): at 12:25

## 2023-05-20 NOTE — PROGRESS NOTE ADULT - ATTENDING COMMENTS
74yo man pmhx DM 2, hx of osteomyelitis, gerd, bipolar depression, HLD, HTN, presents from podiatry office for surgical debridement of R foot ulcer. Per pt, some pain, but no fevers, n/v/d, no cp, sob, st;  endorses being in his usual state of health outside of some manageable pain.    # foot ulcer, possible OM, Plantar right foot ulcer with granular tissue, deep to bone,  - VAC upon dc per podiatry , cont dressing per podiatry   -needs rehab  -no ABx at this time, Id will followup   -bone histopathology and cultures    # DM   CAPILLARY BLOOD GLUCOSE  POCT Blood Glucose.: 318 mg/dL (20 May 2023 08:01)  POCT Blood Glucose.: 224 mg/dL (19 May 2023 21:09)  POCT Blood Glucose.: 263 mg/dL (19 May 2023 16:47)  POCT Blood Glucose.: 299 mg/dL (19 May 2023 15:23)  POCT Blood Glucose.: 363 mg/dL (19 May 2023 12:44)    cont insulin per protocol     # bipolar/depression, cont meds  # HTN, HLD cont meds   BP: 128/67 (05-20-23 @ 05:00) (126/60 - 189/83)    #Progress Note Handoff  Pending: Vac for home, ID reeval, fs control   Family discussion: dw pt   Disposition: Home with VN vs snf

## 2023-05-20 NOTE — PROGRESS NOTE ADULT - ASSESSMENT
73M PMHx DM, hx of osteomyelitis s/p right TMA, HTN, HLD, Asthma, bipolar depression, seizure disorder, and GERD presents from podiatry office for surgical debridement of R foot ulcer.    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  - s/p ceftriaxone and flagyl in ED  - s/p debridement today  - BCx - NGTD  - PT following, heel touch  - OR cultures - Moderate Escherichia coli and Few Streptococcus mitis/oralis   - f/u ID for Abx recs    #DMT2  - A1C 7.3%  - FS have been high  - Starting 10u Lantus and 3u Lispro premeal w/ ISS  - monitor FS, adjust PRN    #HTN  #HLD  - not on statin  - lipid profile wnl  - c/w Metoprolol 25mg BID  - adjust BP meds PRN    #Asthma  - not in exacerbation  - c/w inhalers    #Seizure Disorder  - c/w home Keppra 750mg BID  - c/w home lacosamide 100mg BID    #HO Bipolar Depression  - not on meds  - not in manic or depression state    #GERD  - therapeutic exchange for home omeprazole    #s/p R cataract surgery  - c/w Prednisolone 1% drops of right eye QID  - c/w ofloxacin 0.3%, 1 drop in right eye QID    #DVT ppx: lovenox  #GI ppx: PPI  #Diet: DASH/TLC/CC  #Activity: AAT - PT  #Code Status: Full Code  #Dispo: home, acute  #Labs: CBC, BMP 73M PMHx DM, hx of osteomyelitis s/p right TMA, HTN, HLD, Asthma, bipolar depression, seizure disorder, and GERD presents from podiatry office for surgical debridement of R foot ulcer.    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  - s/p ceftriaxone and flagyl in ED  - s/p debridement today  - BCx - NGTD  - PT following, heel touch  - OR cultures - Moderate Escherichia coli and Few Streptococcus mitis/oralis   - started on Augmentin 875 BID for 7 days  - f/u ID for Abx recs    #DMT2  - A1C 7.3%  - FS have been high  - Starting 10u Lantus and 3u Lispro premeal w/ ISS  - monitor FS, adjust PRN    #HTN  #HLD  - not on statin  - lipid profile wnl  - c/w Metoprolol 25mg BID  - adjust BP meds PRN    #Asthma  - not in exacerbation  - c/w inhalers    #Seizure Disorder  - c/w home Keppra 750mg BID  - c/w home lacosamide 100mg BID    #HO Bipolar Depression  - not on meds  - not in manic or depression state    #GERD  - therapeutic exchange for home omeprazole    #s/p R cataract surgery  - c/w Prednisolone 1% drops of right eye QID  - c/w ofloxacin 0.3%, 1 drop in right eye QID    #DVT ppx: lovenox  #GI ppx: PPI  #Diet: DASH/TLC/CC  #Activity: AAT - PT  #Code Status: Full Code  #Dispo: home, acute  #Labs: CBC, BMP

## 2023-05-20 NOTE — PROGRESS NOTE ADULT - SUBJECTIVE AND OBJECTIVE BOX
SUBJECTIVE:    Patient is a 73y old Male who presents with a chief complaint of DFU (19 May 2023 08:44)    Currently admitted to medicine with the primary diagnosis of: DFU    Today is hospital day 4d.     Overnight Events:     no significant overnight events    PAST MEDICAL & SURGICAL HISTORY  DM (diabetes mellitus)  Type 2, 12/27/18 hgb aic  11.2    HTN (hypertension)    Dyslipidemia    Diabetic foot ulcer    Depression    GERD (gastroesophageal reflux disease)    Neuropathy, diabetic    Toe amputation status, right  (2008)    History of amputation of toe  LT -partial 1st toe      SOCIAL HISTORY:  no significant social hx    ALLERGIES:  No Known Allergies    MEDICATIONS:  STANDING MEDICATIONS  budesonide  80 MICROgram(s)/formoterol 4.5 MICROgram(s) Inhaler 2 Puff(s) Inhalation two times a day  dextrose 5%. 1000 milliLiter(s) IV Continuous <Continuous>  dextrose 5%. 1000 milliLiter(s) IV Continuous <Continuous>  dextrose 50% Injectable 25 Gram(s) IV Push once  dextrose 50% Injectable 25 Gram(s) IV Push once  dextrose 50% Injectable 12.5 Gram(s) IV Push once  enoxaparin Injectable 40 milliGRAM(s) SubCutaneous every 24 hours  glucagon  Injectable 1 milliGRAM(s) IntraMuscular once  insulin glargine Injectable (LANTUS) 10 Unit(s) SubCutaneous at bedtime  insulin lispro (ADMELOG) corrective regimen sliding scale   SubCutaneous three times a day before meals  insulin lispro Injectable (ADMELOG) 3 Unit(s) SubCutaneous three times a day before meals  lacosamide 100 milliGRAM(s) Oral two times a day  levETIRAcetam 750 milliGRAM(s) Oral two times a day  metoprolol tartrate 25 milliGRAM(s) Oral two times a day  ofloxacin 0.3% Solution 1 Drop(s) Right EYE four times a day  pantoprazole    Tablet 40 milliGRAM(s) Oral before breakfast  prednisoLONE acetate 1% Suspension 1 Drop(s) Right EYE four times a day    PRN MEDICATIONS  acetaminophen     Tablet .. 650 milliGRAM(s) Oral every 6 hours PRN  dextrose Oral Gel 15 Gram(s) Oral once PRN  hydrALAZINE 25 milliGRAM(s) Oral every 6 hours PRN  melatonin 3 milliGRAM(s) Oral at bedtime PRN    VITALS:   ICU Vital Signs Last 24 Hrs  T(C): 36.6 (20 May 2023 05:00), Max: 36.6 (19 May 2023 12:48)  T(F): 97.8 (20 May 2023 05:00), Max: 97.9 (19 May 2023 12:48)  HR: 73 (20 May 2023 05:00) (62 - 73)  BP: 128/67 (20 May 2023 05:00) (126/60 - 189/83)  RR: 18 (20 May 2023 05:00) (18 - 18)  SpO2: 99% (20 May 2023 06:00) (98% - 100%)    O2 Parameters below as of 20 May 2023 06:00  Patient On (Oxygen Delivery Method): room air      LABS:                        12.5   7.05  )-----------( 226      ( 19 May 2023 06:40 )             38.0     05-20    142  |  109  |  21<H>  ----------------------------<  256<H>  4.8   |  21  |  1.1    Ca    9.1      20 May 2023 08:48  Mg     2.1     05-19      Culture - Surgical Swab (collected 18 May 2023 10:19)  Source: .Surgical Swab None  Preliminary Report (19 May 2023 21:38):    Moderate Escherichia coli    Few Streptococcus mitis/oralis group Susceptibilites not performed.      RADIOLOGY:    < from: Xray Foot AP + Lateral + Oblique, Right (05.18.23 @ 10:58) >    ACC: [id number] EXAM:  XR FOOT COMP MIN 3 VIEWS RT   ORDERED BY: EDGAR HALL     PROCEDURE DATE:  05/18/2023      INTERPRETATION:  INDICATION: Postoperative assessment    COMPARISON: Right foot radiographs dated 5/16/2023.    TECHNIQUE: 3 views of the right foot.    FINDINGS/  IMPRESSION:    The patient is again status post transmetatarsal amputation of all rays.    Compared to 5/16/2023 there has been interval removal of 4 out of 5   staples in the medial forefoot.    There is no definiteosseous erosion/osteomyelitis.    --- End of Report ---    CHANCE BHAKTA MD; Attending Radiologist  This document has been electronically signed. May 18 2023 11:59AM    < end of copied text >      PHYSICAL EXAM:  GEN: in NAD  LUNGS: clear to ascultation b/l, no wheezing  HEART: normal rate and rhythm  ABD: soft, nontender, nondistended, +BS, no guarding  EXT: trace edema in b/l LEs  NEURO: A&Ox3    Mobility (6 Click Score):      /24    Lines:  Central line:              Date placed:             Indication:   Intravenous Access:   NG tube:   Basilio Catheter:

## 2023-05-21 LAB
ANION GAP SERPL CALC-SCNC: 9 MMOL/L — SIGNIFICANT CHANGE UP (ref 7–14)
BUN SERPL-MCNC: 19 MG/DL — SIGNIFICANT CHANGE UP (ref 10–20)
CALCIUM SERPL-MCNC: 8.7 MG/DL — SIGNIFICANT CHANGE UP (ref 8.4–10.5)
CHLORIDE SERPL-SCNC: 107 MMOL/L — SIGNIFICANT CHANGE UP (ref 98–110)
CO2 SERPL-SCNC: 24 MMOL/L — SIGNIFICANT CHANGE UP (ref 17–32)
CREAT SERPL-MCNC: 1 MG/DL — SIGNIFICANT CHANGE UP (ref 0.7–1.5)
EGFR: 78 ML/MIN/1.73M2 — SIGNIFICANT CHANGE UP
GLUCOSE BLDC GLUCOMTR-MCNC: 142 MG/DL — HIGH (ref 70–99)
GLUCOSE BLDC GLUCOMTR-MCNC: 200 MG/DL — HIGH (ref 70–99)
GLUCOSE BLDC GLUCOMTR-MCNC: 207 MG/DL — HIGH (ref 70–99)
GLUCOSE BLDC GLUCOMTR-MCNC: 265 MG/DL — HIGH (ref 70–99)
GLUCOSE SERPL-MCNC: 134 MG/DL — HIGH (ref 70–99)
HCT VFR BLD CALC: 38.6 % — LOW (ref 42–52)
HGB BLD-MCNC: 12.8 G/DL — LOW (ref 14–18)
MCHC RBC-ENTMCNC: 29.6 PG — SIGNIFICANT CHANGE UP (ref 27–31)
MCHC RBC-ENTMCNC: 33.2 G/DL — SIGNIFICANT CHANGE UP (ref 32–37)
MCV RBC AUTO: 89.4 FL — SIGNIFICANT CHANGE UP (ref 80–94)
NRBC # BLD: 0 /100 WBCS — SIGNIFICANT CHANGE UP (ref 0–0)
PLATELET # BLD AUTO: 237 K/UL — SIGNIFICANT CHANGE UP (ref 130–400)
PMV BLD: 10.6 FL — HIGH (ref 7.4–10.4)
POTASSIUM SERPL-MCNC: 4.5 MMOL/L — SIGNIFICANT CHANGE UP (ref 3.5–5)
POTASSIUM SERPL-SCNC: 4.5 MMOL/L — SIGNIFICANT CHANGE UP (ref 3.5–5)
RBC # BLD: 4.32 M/UL — LOW (ref 4.7–6.1)
RBC # FLD: 12.6 % — SIGNIFICANT CHANGE UP (ref 11.5–14.5)
SODIUM SERPL-SCNC: 140 MMOL/L — SIGNIFICANT CHANGE UP (ref 135–146)
WBC # BLD: 7.08 K/UL — SIGNIFICANT CHANGE UP (ref 4.8–10.8)
WBC # FLD AUTO: 7.08 K/UL — SIGNIFICANT CHANGE UP (ref 4.8–10.8)

## 2023-05-21 PROCEDURE — 99233 SBSQ HOSP IP/OBS HIGH 50: CPT

## 2023-05-21 RX ORDER — INSULIN LISPRO 100/ML
3 VIAL (ML) SUBCUTANEOUS ONCE
Refills: 0 | Status: COMPLETED | OUTPATIENT
Start: 2023-05-21 | End: 2023-05-21

## 2023-05-21 RX ADMIN — Medication 1 DROP(S): at 12:40

## 2023-05-21 RX ADMIN — Medication 1 DROP(S): at 05:04

## 2023-05-21 RX ADMIN — LACOSAMIDE 100 MILLIGRAM(S): 50 TABLET ORAL at 17:55

## 2023-05-21 RX ADMIN — INSULIN GLARGINE 10 UNIT(S): 100 INJECTION, SOLUTION SUBCUTANEOUS at 21:16

## 2023-05-21 RX ADMIN — Medication 25 MILLIGRAM(S): at 17:54

## 2023-05-21 RX ADMIN — LACOSAMIDE 100 MILLIGRAM(S): 50 TABLET ORAL at 05:03

## 2023-05-21 RX ADMIN — Medication 1 DROP(S): at 00:03

## 2023-05-21 RX ADMIN — Medication 1 TABLET(S): at 05:02

## 2023-05-21 RX ADMIN — Medication 0: at 08:34

## 2023-05-21 RX ADMIN — Medication 1 DROP(S): at 17:54

## 2023-05-21 RX ADMIN — Medication 2: at 17:28

## 2023-05-21 RX ADMIN — Medication 1: at 12:00

## 2023-05-21 RX ADMIN — Medication 3 UNIT(S): at 22:50

## 2023-05-21 RX ADMIN — LEVETIRACETAM 750 MILLIGRAM(S): 250 TABLET, FILM COATED ORAL at 17:54

## 2023-05-21 RX ADMIN — Medication 3 UNIT(S): at 08:36

## 2023-05-21 RX ADMIN — Medication 25 MILLIGRAM(S): at 05:02

## 2023-05-21 RX ADMIN — ENOXAPARIN SODIUM 40 MILLIGRAM(S): 100 INJECTION SUBCUTANEOUS at 21:17

## 2023-05-21 RX ADMIN — LEVETIRACETAM 750 MILLIGRAM(S): 250 TABLET, FILM COATED ORAL at 05:03

## 2023-05-21 RX ADMIN — Medication 25 MILLIGRAM(S): at 13:36

## 2023-05-21 RX ADMIN — Medication 1 TABLET(S): at 17:56

## 2023-05-21 RX ADMIN — PANTOPRAZOLE SODIUM 40 MILLIGRAM(S): 20 TABLET, DELAYED RELEASE ORAL at 05:02

## 2023-05-21 RX ADMIN — Medication 3 UNIT(S): at 17:28

## 2023-05-21 RX ADMIN — Medication 3 UNIT(S): at 12:02

## 2023-05-21 NOTE — PHYSICAL THERAPY INITIAL EVALUATION ADULT - GENERAL OBSERVATIONS, REHAB EVAL
PT IE [id number]. Chart reviewed. pt encountered semi-Alexandre in bed. In NAD. + IV lock R foot dressing in place

## 2023-05-21 NOTE — PROGRESS NOTE ADULT - SUBJECTIVE AND OBJECTIVE BOX
pt seen and examined.     My notes supersede resident's notes in case of discrepancy       ROS: no cp, no sob, no n/v, no fever    Vital Signs Last 24 Hrs  T(C): 36.6 (21 May 2023 04:14), Max: 36.6 (20 May 2023 20:41)  T(F): 97.9 (21 May 2023 04:14), Max: 97.9 (21 May 2023 04:14)  HR: 58 (21 May 2023 06:05) (56 - 67)  BP: 155/70 (21 May 2023 06:05) (143/68 - 172/81)  BP(mean): --  RR: 19 (21 May 2023 04:14) (18 - 19)  SpO2: 99% (21 May 2023 04:14) (99% - 99%)    Parameters below as of 21 May 2023 04:14  Patient On (Oxygen Delivery Method): room air        physical exam  constitutional NAD, AAOX3, Respiratory  lungs CTA, CVS heart RRR, GI: abdomen Soft NT, ND, BS+, skin: intact  neuro exam Motor, sensory and CN normal, no deficit     MEDICATIONS  (STANDING):  amoxicillin  875 milliGRAM(s)/clavulanate 1 Tablet(s) Oral every 12 hours  budesonide  80 MICROgram(s)/formoterol 4.5 MICROgram(s) Inhaler 2 Puff(s) Inhalation two times a day  dextrose 5%. 1000 milliLiter(s) (50 mL/Hr) IV Continuous <Continuous>  dextrose 5%. 1000 milliLiter(s) (100 mL/Hr) IV Continuous <Continuous>  dextrose 50% Injectable 25 Gram(s) IV Push once  dextrose 50% Injectable 25 Gram(s) IV Push once  dextrose 50% Injectable 12.5 Gram(s) IV Push once  enoxaparin Injectable 40 milliGRAM(s) SubCutaneous every 24 hours  glucagon  Injectable 1 milliGRAM(s) IntraMuscular once  insulin glargine Injectable (LANTUS) 10 Unit(s) SubCutaneous at bedtime  insulin lispro (ADMELOG) corrective regimen sliding scale   SubCutaneous three times a day before meals  insulin lispro Injectable (ADMELOG) 3 Unit(s) SubCutaneous three times a day before meals  lacosamide 100 milliGRAM(s) Oral two times a day  levETIRAcetam 750 milliGRAM(s) Oral two times a day  metoprolol tartrate 25 milliGRAM(s) Oral two times a day  ofloxacin 0.3% Solution 1 Drop(s) Right EYE four times a day  pantoprazole    Tablet 40 milliGRAM(s) Oral before breakfast  prednisoLONE acetate 1% Suspension 1 Drop(s) Right EYE four times a day    MEDICATIONS  (PRN):  acetaminophen     Tablet .. 650 milliGRAM(s) Oral every 6 hours PRN Temp greater or equal to 38C (100.4F), Mild Pain (1 - 3)  dextrose Oral Gel 15 Gram(s) Oral once PRN Blood Glucose LESS THAN 70 milliGRAM(s)/deciliter  hydrALAZINE 25 milliGRAM(s) Oral every 6 hours PRN Systolic blood pressure >160 mmg  melatonin 3 milliGRAM(s) Oral at bedtime PRN Insomnia                            12.8   7.08  )-----------( 237      ( 21 May 2023 07:37 )             38.6     05-21    140  |  107  |  19  ----------------------------<  134<H>  4.5   |  24  |  1.0    Ca    8.7      21 May 2023 07:37    72yo man pmhx DM 2, hx of osteomyelitis, gerd, bipolar depression, HLD, HTN, presents from podiatry office for surgical debridement of R foot ulcer. Per pt, some pain, but no fevers, n/v/d, no cp, sob, st;  endorses being in his usual state of health outside of some manageable pain.    # foot ulcer, possible OM, Plantar right foot ulcer with granular tissue, deep to bone,  dressing per prodiatry   -needs rehab  -no ABx at this time per ID   -bone histopathology and cultures  pod followup   dw podiaty]  may need vac on dc     # DM   CAPILLARY BLOOD GLUCOSE    POCT Blood Glucose.: 142 mg/dL (21 May 2023 07:55)  POCT Blood Glucose.: 212 mg/dL (20 May 2023 21:32)  POCT Blood Glucose.: 205 mg/dL (20 May 2023 16:37)  POCT Blood Glucose.: 345 mg/dL (20 May 2023 11:28)    cont insulin per protocol     # bipolar/depression, cont meds  # HTN, HLD cont meds   BP: 155/70 (05-21-23 @ 06:05) (143/68 - 172/81)    #Progress Note Handoff  Pending: Vac for home, ID reeval, fs control   Family discussion: fidel pt   Disposition: Home with VN vs snf. pt wants to go home

## 2023-05-21 NOTE — PHYSICAL THERAPY INITIAL EVALUATION ADULT - PERTINENT HX OF CURRENT PROBLEM, REHAB EVAL
74yo man pmhx DM 2, hx of osteomyelitis, gerd, bipolar depression, HLD, HTN, presents from podiatry office for surgical debridement of R foot ulcer.

## 2023-05-22 ENCOUNTER — TRANSCRIPTION ENCOUNTER (OUTPATIENT)
Age: 73
End: 2023-05-22

## 2023-05-22 LAB
ANION GAP SERPL CALC-SCNC: 8 MMOL/L — SIGNIFICANT CHANGE UP (ref 7–14)
BUN SERPL-MCNC: 18 MG/DL — SIGNIFICANT CHANGE UP (ref 10–20)
CALCIUM SERPL-MCNC: 8.8 MG/DL — SIGNIFICANT CHANGE UP (ref 8.4–10.5)
CHLORIDE SERPL-SCNC: 104 MMOL/L — SIGNIFICANT CHANGE UP (ref 98–110)
CO2 SERPL-SCNC: 27 MMOL/L — SIGNIFICANT CHANGE UP (ref 17–32)
CREAT SERPL-MCNC: 0.9 MG/DL — SIGNIFICANT CHANGE UP (ref 0.7–1.5)
CULTURE RESULTS: SIGNIFICANT CHANGE UP
CULTURE RESULTS: SIGNIFICANT CHANGE UP
EGFR: 89 ML/MIN/1.73M2 — SIGNIFICANT CHANGE UP
GLUCOSE BLDC GLUCOMTR-MCNC: 162 MG/DL — HIGH (ref 70–99)
GLUCOSE BLDC GLUCOMTR-MCNC: 229 MG/DL — HIGH (ref 70–99)
GLUCOSE BLDC GLUCOMTR-MCNC: 271 MG/DL — HIGH (ref 70–99)
GLUCOSE BLDC GLUCOMTR-MCNC: 321 MG/DL — HIGH (ref 70–99)
GLUCOSE SERPL-MCNC: 181 MG/DL — HIGH (ref 70–99)
HCT VFR BLD CALC: 38.4 % — LOW (ref 42–52)
HGB BLD-MCNC: 12.7 G/DL — LOW (ref 14–18)
MCHC RBC-ENTMCNC: 29.3 PG — SIGNIFICANT CHANGE UP (ref 27–31)
MCHC RBC-ENTMCNC: 33.1 G/DL — SIGNIFICANT CHANGE UP (ref 32–37)
MCV RBC AUTO: 88.7 FL — SIGNIFICANT CHANGE UP (ref 80–94)
NRBC # BLD: 0 /100 WBCS — SIGNIFICANT CHANGE UP (ref 0–0)
PLATELET # BLD AUTO: 268 K/UL — SIGNIFICANT CHANGE UP (ref 130–400)
PMV BLD: 10.4 FL — SIGNIFICANT CHANGE UP (ref 7.4–10.4)
POTASSIUM SERPL-MCNC: 4.1 MMOL/L — SIGNIFICANT CHANGE UP (ref 3.5–5)
POTASSIUM SERPL-SCNC: 4.1 MMOL/L — SIGNIFICANT CHANGE UP (ref 3.5–5)
RBC # BLD: 4.33 M/UL — LOW (ref 4.7–6.1)
RBC # FLD: 12.3 % — SIGNIFICANT CHANGE UP (ref 11.5–14.5)
SODIUM SERPL-SCNC: 139 MMOL/L — SIGNIFICANT CHANGE UP (ref 135–146)
SPECIMEN SOURCE: SIGNIFICANT CHANGE UP
SPECIMEN SOURCE: SIGNIFICANT CHANGE UP
WBC # BLD: 6.73 K/UL — SIGNIFICANT CHANGE UP (ref 4.8–10.8)
WBC # FLD AUTO: 6.73 K/UL — SIGNIFICANT CHANGE UP (ref 4.8–10.8)

## 2023-05-22 PROCEDURE — 99232 SBSQ HOSP IP/OBS MODERATE 35: CPT | Mod: GC

## 2023-05-22 RX ADMIN — Medication 1 DROP(S): at 18:11

## 2023-05-22 RX ADMIN — Medication 1: at 08:08

## 2023-05-22 RX ADMIN — Medication 1 DROP(S): at 23:05

## 2023-05-22 RX ADMIN — LEVETIRACETAM 750 MILLIGRAM(S): 250 TABLET, FILM COATED ORAL at 05:07

## 2023-05-22 RX ADMIN — Medication 1 TABLET(S): at 05:06

## 2023-05-22 RX ADMIN — PANTOPRAZOLE SODIUM 40 MILLIGRAM(S): 20 TABLET, DELAYED RELEASE ORAL at 05:07

## 2023-05-22 RX ADMIN — Medication 25 MILLIGRAM(S): at 18:10

## 2023-05-22 RX ADMIN — LEVETIRACETAM 750 MILLIGRAM(S): 250 TABLET, FILM COATED ORAL at 18:10

## 2023-05-22 RX ADMIN — LACOSAMIDE 100 MILLIGRAM(S): 50 TABLET ORAL at 18:10

## 2023-05-22 RX ADMIN — Medication 1 DROP(S): at 11:32

## 2023-05-22 RX ADMIN — Medication 1 TABLET(S): at 18:10

## 2023-05-22 RX ADMIN — Medication 3 UNIT(S): at 17:49

## 2023-05-22 RX ADMIN — Medication 25 MILLIGRAM(S): at 11:30

## 2023-05-22 RX ADMIN — Medication 1 DROP(S): at 00:36

## 2023-05-22 RX ADMIN — ENOXAPARIN SODIUM 40 MILLIGRAM(S): 100 INJECTION SUBCUTANEOUS at 21:37

## 2023-05-22 RX ADMIN — INSULIN GLARGINE 10 UNIT(S): 100 INJECTION, SOLUTION SUBCUTANEOUS at 21:49

## 2023-05-22 RX ADMIN — Medication 1 DROP(S): at 00:35

## 2023-05-22 RX ADMIN — Medication 3 UNIT(S): at 11:31

## 2023-05-22 RX ADMIN — Medication 3 UNIT(S): at 08:09

## 2023-05-22 RX ADMIN — Medication 1 DROP(S): at 05:09

## 2023-05-22 RX ADMIN — LACOSAMIDE 100 MILLIGRAM(S): 50 TABLET ORAL at 05:07

## 2023-05-22 RX ADMIN — Medication 3: at 11:31

## 2023-05-22 RX ADMIN — Medication 4: at 17:48

## 2023-05-22 RX ADMIN — Medication 1 DROP(S): at 05:08

## 2023-05-22 NOTE — PROGRESS NOTE ADULT - ATTENDING COMMENTS
a/p  # 72yo man pmhx DM 2, hx of osteomyelitis, gerd, bipolar depression, HLD, HTN, presents from podiatry office for surgical debridement of R foot ulcer. Per pt, some pain, but no fevers, n/v/d, no cp, sob, st;  endorses being in his usual state of health outside of some manageable pain.    # foot ulcer, possible OM, Plantar right foot ulcer with granular tissue, deep to bone,  dressing per prodiatry   -needs rehab  -no ABx at this time per ID   -bone histopathology and cultures  pod followup   dw podiaty]  may need vac on dc     # DM   CAPILLARY BLOOD GLUCOSE    POCT Blood Glucose.: 162 mg/dL (22 May 2023 07:46)  POCT Blood Glucose.: 265 mg/dL (21 May 2023 21:12)  POCT Blood Glucose.: 207 mg/dL (21 May 2023 17:18)  POCT Blood Glucose.: 200 mg/dL (21 May 2023 11:53)    cont insulin per protocol     # bipolar/depression, cont meds  # HTN, HLD cont meds   BP: 162/74 (05-21-23 @ 21:10) (160/69 - 195/89)    #Progress Note Handoff  Pending: Vac for home, ID re-eval, fs control   Family discussion: fidel pt   Disposition: Home with VN vs snf. pt wants to go home

## 2023-05-22 NOTE — DISCHARGE NOTE NURSING/CASE MANAGEMENT/SOCIAL WORK - NSDCPEFALRISK_GEN_ALL_CORE
For information on Fall & Injury Prevention, visit: [email or web address] OR  [email or web address] OR  https://www.cdc.gov/steadi/patient.html

## 2023-05-22 NOTE — CHART NOTE - NSCHARTNOTEFT_GEN_A_CORE
Limited Note:  patient hospital length of stay >7days     Medical:  74yo man pmhx DM 2, hx of osteomyelitis, gerd, bipolar depression, HLD, HTN, presents from podiatry office for surgical debridement of R foot ulcer.     Nutrition:  Good appetite/PO intake during admission, consuming % of meal trays     Diet:  Diet, Regular:   Consistent Carbohydrate {Evening Snack}  DASH/TLC {Sodium & Cholesterol Restricted} (05-18-23 @ 09:33) [Active]    Anthropometrics:  Height: 180.3cm  Weight 93.4kg (05-18-23 @ 07:25)  BMI: 28.7kg/m2    previous admission weights (kg):  82.3 (09-09-21 @ 18:09),   88.2 (08-24-21 @ 06:35),   95.9 (03-10-21 @ 14:33),   95.3 (10-30-20 @ 10:47),   93.9 (10-19-20 @ 16:28)  no significant unintentional weight loss     CAPILLARY BLOOD GLUCOSE  POCT Blood Glucose.: 271 mg/dL (22 May 2023 11:21)  POCT Blood Glucose.: 162 mg/dL (22 May 2023 07:46)  POCT Blood Glucose.: 265 mg/dL (21 May 2023 21:12)  POCT Blood Glucose.: 207 mg/dL (21 May 2023 17:18)    LABS:                     12.7   6.73  )-----------( 268      ( 22 May 2023 07:29 )             38.4     05-22    139  |  104  |  18  ----------------------------<  181<H>  4.1   |  27  |  0.9    Ca    8.8      22 May 2023 07:29    cognition A&Ox4   edema EXT: trace edema in b/l LEs  no pressure injuries noted in flow sheets     continue diet   patient at no nutrition risk, will follow up PRN or within 7-10days  Contact RD if needed  Tez Gutierrez, #[phone] spectra or via TEAMS
This note is to verify that patient has been admitted to [facility] since May 16 2023 for treatment of illness. Daughter Amber Ceja has been bedside caring for patient throughout the stay.
Patient seen bedside; discussed plan for wound VAC as outpatient. Pt is agreeable at this time. No further surgical intervention; stable from podiatry standpoint.     Podiatry   [phone]
Scheduled for surgery tomorrow Thurs 5/18 at 3pm. Please document medical clearance in chart. PT NPO MN 5/18. Please optimize lab values prior to OR.

## 2023-05-22 NOTE — PROGRESS NOTE ADULT - ASSESSMENT
73M PMHx DM, hx of osteomyelitis s/p right TMA, HTN, HLD, Asthma, bipolar depression, seizure disorder, and GERD presents from podiatry office for surgical debridement of R foot ulcer.    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  - s/p ceftriaxone and flagyl in ED  - s/p debridement today  - BCx - NGTD  - PT following, heel touch  - OR cultures - Moderate Escherichia coli and Few Streptococcus mitis/oralis   - f/u ID for Abx recs    #DMT2  - A1C 7.3%  - FS have been high  - Started on 10u Lantus and 3u Lispro premeal w/ ISS  - monitor FS, adjust PRN    #HTN  #HLD  - not on statin  - lipid profile wnl  - c/w Metoprolol 25mg BID  - adjust BP meds PRN    #Asthma  - not in exacerbation  - c/w inhalers    #Seizure Disorder  - c/w home Keppra 750mg BID  - c/w home lacosamide 100mg BID    #HO Bipolar Depression  - not on meds  - not in manic or depression state    #GERD  - therapeutic exchange for home omeprazole    #s/p R cataract surgery  - c/w Prednisolone 1% drops of right eye QID  - c/w ofloxacin 0.3%, 1 drop in right eye QID    #DVT ppx: lovenox  #GI ppx: PPI  #Diet: DASH/TLC/CC  #Activity: AAT - PT  #Code Status: Full Code  #Dispo: home, acute  #Labs: CBC, BMP

## 2023-05-22 NOTE — DISCHARGE NOTE NURSING/CASE MANAGEMENT/SOCIAL WORK - PATIENT PORTAL LINK FT
You can access the FollowMyHealth Patient Portal offered by [facility] by registering at the following website: [email or web address]. By joining [employer]’s FollowMyHealth portal, you will also be able to view your health information using other applications (apps) compatible with our system.

## 2023-05-22 NOTE — CHART NOTE - NSCHARTNOTESELECT_GEN_ALL_CORE
Event Note
Event Note
[Palliative Care Plan] : not applicable at this time
Event Note
Event Note
[FreeTextEntry1] : Loc Ro 

## 2023-05-22 NOTE — PROGRESS NOTE ADULT - SUBJECTIVE AND OBJECTIVE BOX
JOSÉ MANUEL PORTER 73y Male  MRN#: [id number]   Hospital Day: 6d    HPI:  74yo man pmhx DM 2, hx of osteomyelitis, gerd, bipolar depression, HLD, HTN, presents from podiatry office for surgical debridement of R foot ulcer.     Per pt, some pain, but no fevers, n/v/d, no cp, sob, st;  endorses being in his usual state of health outside of some manageable pain.    ED  /77 HR 88 RR 16 99% T 98.6F  WBC 10.53 Hg 12.7 Plt 183 BUN 22 Cr 1.3    Given cef, flag, vanc and admitted to medicine   (17 May 2023 01:49)      SUBJECTIVE  Patient is a 73y old Male who presents with a chief complaint of DFU (20 May 2023 11:10)  Currently admitted to medicine with the primary diagnosis of Diabetic foot ulcer      INTERVAL HPI AND OVERNIGHT EVENTS:  Patient was examined and seen at bedside. This morning he is resting comfortably in bed and reports no issues or overnight events.      VITAL SIGNS: Last 24 Hours  T(C): 36.7 (21 May 2023 21:10), Max: 37.1 (21 May 2023 12:30)  T(F): 98.1 (21 May 2023 21:10), Max: 98.7 (21 May 2023 12:30)  HR: 72 (21 May 2023 21:10) (59 - 72)  BP: 162/74 (21 May 2023 21:10) (160/69 - 195/89)  BP(mean): --  RR: 19 (21 May 2023 21:10) (18 - 20)  SpO2: 98% (21 May 2023 13:23) (98% - 98%)    LABS:                        12.8   7.08  )-----------( 237      ( 21 May 2023 07:37 )             38.6     05-21    140  |  107  |  19  ----------------------------<  134<H>  4.5   |  24  |  1.0    Ca    8.7      21 May 2023 07:37    PHYSICAL EXAM:    GEN: in NAD  LUNGS: clear to ascultation b/l, no wheezing  HEART: normal rate and rhythm  ABD: soft, nontender, nondistended, +BS, no guarding  EXT: trace edema in b/l LEs  NEURO: A&Ox3

## 2023-05-23 ENCOUNTER — TRANSCRIPTION ENCOUNTER (OUTPATIENT)
Age: 73
End: 2023-05-23

## 2023-05-23 VITALS
DIASTOLIC BLOOD PRESSURE: 76 MMHG | OXYGEN SATURATION: 93 % | HEART RATE: 69 BPM | TEMPERATURE: 98 F | SYSTOLIC BLOOD PRESSURE: 126 MMHG | RESPIRATION RATE: 18 BRPM

## 2023-05-23 LAB
ALBUMIN SERPL ELPH-MCNC: 3.7 G/DL — SIGNIFICANT CHANGE UP (ref 3.5–5.2)
ALP SERPL-CCNC: 82 U/L — SIGNIFICANT CHANGE UP (ref 30–115)
ALT FLD-CCNC: 15 U/L — SIGNIFICANT CHANGE UP (ref 0–41)
ANION GAP SERPL CALC-SCNC: 9 MMOL/L — SIGNIFICANT CHANGE UP (ref 7–14)
AST SERPL-CCNC: 13 U/L — SIGNIFICANT CHANGE UP (ref 0–41)
BASOPHILS # BLD AUTO: 0.02 K/UL — SIGNIFICANT CHANGE UP (ref 0–0.2)
BASOPHILS NFR BLD AUTO: 0.3 % — SIGNIFICANT CHANGE UP (ref 0–1)
BILIRUB SERPL-MCNC: 0.3 MG/DL — SIGNIFICANT CHANGE UP (ref 0.2–1.2)
BUN SERPL-MCNC: 14 MG/DL — SIGNIFICANT CHANGE UP (ref 10–20)
CALCIUM SERPL-MCNC: 8.9 MG/DL — SIGNIFICANT CHANGE UP (ref 8.4–10.5)
CHLORIDE SERPL-SCNC: 107 MMOL/L — SIGNIFICANT CHANGE UP (ref 98–110)
CO2 SERPL-SCNC: 26 MMOL/L — SIGNIFICANT CHANGE UP (ref 17–32)
CREAT SERPL-MCNC: 0.8 MG/DL — SIGNIFICANT CHANGE UP (ref 0.7–1.5)
EGFR: 92 ML/MIN/1.73M2 — SIGNIFICANT CHANGE UP
EOSINOPHIL # BLD AUTO: 0.07 K/UL — SIGNIFICANT CHANGE UP (ref 0–0.7)
EOSINOPHIL NFR BLD AUTO: 1.1 % — SIGNIFICANT CHANGE UP (ref 0–8)
GLUCOSE BLDC GLUCOMTR-MCNC: 221 MG/DL — HIGH (ref 70–99)
GLUCOSE BLDC GLUCOMTR-MCNC: 347 MG/DL — HIGH (ref 70–99)
GLUCOSE SERPL-MCNC: 245 MG/DL — HIGH (ref 70–99)
HCT VFR BLD CALC: 39.9 % — LOW (ref 42–52)
HGB BLD-MCNC: 13.2 G/DL — LOW (ref 14–18)
IMM GRANULOCYTES NFR BLD AUTO: 1.1 % — HIGH (ref 0.1–0.3)
LYMPHOCYTES # BLD AUTO: 1.16 K/UL — LOW (ref 1.2–3.4)
LYMPHOCYTES # BLD AUTO: 17.7 % — LOW (ref 20.5–51.1)
MAGNESIUM SERPL-MCNC: 2.3 MG/DL — SIGNIFICANT CHANGE UP (ref 1.8–2.4)
MCHC RBC-ENTMCNC: 29.5 PG — SIGNIFICANT CHANGE UP (ref 27–31)
MCHC RBC-ENTMCNC: 33.1 G/DL — SIGNIFICANT CHANGE UP (ref 32–37)
MCV RBC AUTO: 89.3 FL — SIGNIFICANT CHANGE UP (ref 80–94)
MONOCYTES # BLD AUTO: 0.51 K/UL — SIGNIFICANT CHANGE UP (ref 0.1–0.6)
MONOCYTES NFR BLD AUTO: 7.8 % — SIGNIFICANT CHANGE UP (ref 1.7–9.3)
NEUTROPHILS # BLD AUTO: 4.72 K/UL — SIGNIFICANT CHANGE UP (ref 1.4–6.5)
NEUTROPHILS NFR BLD AUTO: 72 % — SIGNIFICANT CHANGE UP (ref 42.2–75.2)
NRBC # BLD: 0 /100 WBCS — SIGNIFICANT CHANGE UP (ref 0–0)
PLATELET # BLD AUTO: 257 K/UL — SIGNIFICANT CHANGE UP (ref 130–400)
PMV BLD: 10.4 FL — SIGNIFICANT CHANGE UP (ref 7.4–10.4)
POTASSIUM SERPL-MCNC: 4.8 MMOL/L — SIGNIFICANT CHANGE UP (ref 3.5–5)
POTASSIUM SERPL-SCNC: 4.8 MMOL/L — SIGNIFICANT CHANGE UP (ref 3.5–5)
PROT SERPL-MCNC: 5.9 G/DL — LOW (ref 6–8)
RBC # BLD: 4.47 M/UL — LOW (ref 4.7–6.1)
RBC # FLD: 12.5 % — SIGNIFICANT CHANGE UP (ref 11.5–14.5)
SODIUM SERPL-SCNC: 142 MMOL/L — SIGNIFICANT CHANGE UP (ref 135–146)
WBC # BLD: 6.55 K/UL — SIGNIFICANT CHANGE UP (ref 4.8–10.8)
WBC # FLD AUTO: 6.55 K/UL — SIGNIFICANT CHANGE UP (ref 4.8–10.8)

## 2023-05-23 PROCEDURE — 99239 HOSP IP/OBS DSCHRG MGMT >30: CPT

## 2023-05-23 RX ADMIN — Medication 25 MILLIGRAM(S): at 05:36

## 2023-05-23 RX ADMIN — LEVETIRACETAM 750 MILLIGRAM(S): 250 TABLET, FILM COATED ORAL at 05:36

## 2023-05-23 RX ADMIN — Medication 2: at 08:04

## 2023-05-23 RX ADMIN — Medication 4: at 12:29

## 2023-05-23 RX ADMIN — Medication 3 UNIT(S): at 08:05

## 2023-05-23 RX ADMIN — Medication 1 TABLET(S): at 05:37

## 2023-05-23 RX ADMIN — Medication 1 DROP(S): at 12:28

## 2023-05-23 RX ADMIN — PANTOPRAZOLE SODIUM 40 MILLIGRAM(S): 20 TABLET, DELAYED RELEASE ORAL at 05:36

## 2023-05-23 RX ADMIN — LACOSAMIDE 100 MILLIGRAM(S): 50 TABLET ORAL at 05:36

## 2023-05-23 RX ADMIN — Medication 1 DROP(S): at 05:37

## 2023-05-23 RX ADMIN — Medication 1 DROP(S): at 05:36

## 2023-05-23 RX ADMIN — Medication 3 UNIT(S): at 12:30

## 2023-05-23 RX ADMIN — Medication 1 DROP(S): at 12:29

## 2023-05-23 NOTE — PROGRESS NOTE ADULT - ATTENDING COMMENTS
72yo man pmhx DM 2, hx of osteomyelitis, gerd, bipolar depression, HLD, HTN, presents from podiatry office for surgical debridement of R foot ulcer. Per pt, some pain, but no fevers, n/v/d, no cp, sob, st;  endorses being in his usual state of health outside of some manageable pain.    # foot ulcer, possible OM, Plantar right foot ulcer with granular tissue, deep to bone,  dressing per prodiatry   -needs rehab  -no ABx at this time per ID   -bone histopathology and cultures  pod followup   dw podiaty]  may need vac on dc     # DM   CAPILLARY BLOOD GLUCOSE    POCT Blood Glucose.: 221 mg/dL (23 May 2023 07:54)  POCT Blood Glucose.: 229 mg/dL (22 May 2023 21:40)  POCT Blood Glucose.: 321 mg/dL (22 May 2023 17:11)  POCT Blood Glucose.: 271 mg/dL (22 May 2023 11:21)    cont insulin per protocol     # bipolar/depression, cont meds    # HTN, HLD cont meds   BP: 150/71 (05-23-23 @ 04:50) (139/65 - 194/84)    #Progress Note Handoff  Pending: discharge today   Family discussion: dw pt   Disposition: Home with VN   time spent 35 min

## 2023-05-23 NOTE — PROGRESS NOTE ADULT - ASSESSMENT
73M PMHx DM, hx of osteomyelitis s/p right TMA, HTN, HLD, Asthma, bipolar depression, seizure disorder, and GERD presents from podiatry office for surgical debridement of R foot ulcer.    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  - s/p ceftriaxone and flagyl in ED  - s/p debridement today  - BCx - NGTD  - PT following, heel touch  - OR cultures - Moderate Escherichia coli and Few Streptococcus mitis/oralis   - f/u ID for Abx recs    #DMT2  - A1C 7.3%  - FS have been high  - Started on 10u Lantus and 3u Lispro premeal w/ ISS  - monitor FS, adjust PRN    #HTN  #HLD  - not on statin  - lipid profile wnl  - c/w Metoprolol 25mg BID  - adjust BP meds PRN    #Asthma  - not in exacerbation  - c/w inhalers    #Seizure Disorder  - c/w home Keppra 750mg BID  - c/w home lacosamide 100mg BID    #HO Bipolar Depression  - not on meds  - not in manic or depression state    #GERD  - therapeutic exchange for home omeprazole    #s/p R cataract surgery  - c/w Prednisolone 1% drops of right eye QID  - c/w ofloxacin 0.3%, 1 drop in right eye QID    #DVT ppx: lovenox  #GI ppx: PPI  #Diet: DASH/TLC/CC  #Activity: AAT - PT  #Code Status: Full Code  #Dispo: home, acute  #Labs: CBC, BMP 73M PMHx DM, hx of osteomyelitis s/p right TMA, HTN, HLD, Asthma, bipolar depression, seizure disorder, and GERD presents from podiatry office for surgical debridement of R foot ulcer.    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  - s/p ceftriaxone and flagyl in ED  - s/p debridement  - BCx - NGTD  - PT following, heel touch  - OR cultures - Moderate Escherichia coli and Few Streptococcus mitis/oralis   - f/u ID for Abx recs    #DMT2  - A1C 7.3%  - FS have been high  - Started on 10u Lantus and 3u Lispro premeal w/ ISS  - monitor FS, adjust PRN    #HTN  #HLD  - not on statin  - lipid profile wnl  - c/w Metoprolol 25mg BID  - adjust BP meds PRN    #Asthma  - not in exacerbation  - c/w inhalers    #Seizure Disorder  - c/w home Keppra 750mg BID  - c/w home lacosamide 100mg BID    #HO Bipolar Depression  - not on meds  - not in manic or depression state    #GERD  - therapeutic exchange for home omeprazole    #s/p R cataract surgery  - c/w Prednisolone 1% drops of right eye QID  - c/w ofloxacin 0.3%, 1 drop in right eye QID    #DVT ppx: lovenox  #GI ppx: PPI  #Diet: DASH/TLC/CC  #Activity: AAT - PT  #Code Status: Full Code  #Dispo: home

## 2023-05-23 NOTE — PROGRESS NOTE ADULT - SUBJECTIVE AND OBJECTIVE BOX
Subjective:   An Rainey is a 53 y.o. female here today for     HPI:Patient is here to discuss:    Cardiology visit follow up  results      Current medicines (including changes today)  Current Outpatient Medications   Medication Sig Dispense Refill    amLODIPine (NORVASC) 10 MG Tab Take 1 Tablet by mouth every day.      apixaban (ELIQUIS) 5mg Tab Take 1 Tablet by mouth 2 times a day for 30 days. Indications: Thromboembolism secondary to Atrial Fibrillation 60 Tablet 0    lisinopril (PRINIVIL) 20 MG Tab Take 1 Tablet by mouth every day. 90 Tablet 1    therapeutic multivitamin-minerals (THERAGRAN-M) Tab Take 1 Tablet by mouth every day.      flecainide (TAMBOCOR) 50 MG tablet Take 2 Tablets by mouth 2 times a day. 60 Tablet 11    albuterol 108 (90 Base) MCG/ACT Aero Soln inhalation aerosol Inhale 2 Puffs every 6 hours as needed for Shortness of Breath.      cetirizine (ZYRTEC) 10 MG Tab Take 1 Tablet by mouth every day. Indications: Hayfever      Cholecalciferol (VITAMIN D) 2000 UNIT Tab Take 500 Units by mouth every day.      fluticasone (FLONASE) 50 MCG/ACT nasal spray Administer 1 Spray into affected nostril(S) every day.       No current facility-administered medications for this visit.     She  has a past medical history of Allergy, Arrhythmia (11/11/2022), Breath shortness (11/11/2022), GERD (gastroesophageal reflux disease), History of anemia, HTN (hypertension) (04/27/2019), Hypertension, Lymph edema, Seasonal allergies, and Snoring.    She has no past medical history of Asthma, Congestive heart failure (HCC), Liver disease, Stroke (HCC), or Type II or unspecified type diabetes mellitus without mention of complication, not stated as uncontrolled.  Patient has no known allergies.     Social History and Family History were reviewed and updated.    ROS   No headaches, chest pain, no shortness of breath, abdominal pain, nausea, or vomiting.  All other systems were reviewed and are negative or noted as  "positive in the HPI.       Objective:     /74   Pulse 77   Temp 36.5 °C (97.7 °F) (Temporal)   Resp 16   Ht 1.651 m (5' 5\")   Wt (!) 160 kg (353 lb)   SpO2 93%  Body mass index is 58.74 kg/m².     Physical Exam:  General: Patient appears well-nourished, well-hydrated, nontoxic  HEENT, normocephalic atraumatic, PERRLA, extraocular movements intact, nares are patent and clear  Neck: No visible masses, thyromegaly or abnormalities noted  Cardiovascular.  Sitting comfortably without visible signs of edema  Lungs: No cyanosis noted, nondyspneic  Skin: Well perfused without evidence of rash or lesions  Neurological: Cranial nerves II through XII intact, normal gait  Musculoskeletal: Normal range of motion, normal strength and no deficit noted     Clinical Course/Lab Analysis:    EKG 11/17/22: My Personal interpretation reveals SB 48     Echo 10/10/22:  Mild concentric left ventricular hypertrophy.  Grossly normal left ventricular systolic function.  Dilated right ventricle with akinesis of the free wall- findings are   suspicious for pulmonary embolism.      Stress Test 4/27/2019:   NUCLEAR IMAGING INTERPRETATION   No evidence of significant jeopardized viable myocardium or prior myocardial    infarction.   Normal left ventricular ejection fraction, and wall motion.   Mild dilatation of LV cavity.      Sinus rhythm.   Nondiagnostic ECG portion of regadenoson nuclear stress test.   ECG INTERPRETATION   Nondiagnostic ECG portion of regadenoson nuclear stress test.    Assessment and Plan:   The following treatment plan was discussed.  Signs and symptoms for which to return were discussed with patient at length.  Patient verbalized understanding.    Problem List Items Addressed This Visit    None     PAF  Chronic stable  Improving.  Patient's heart rate went too low on metoprolol.  Continue to hold off  -Continue flecainide 50 mg twice daily  -Surveillance EKG/follow-ups  -Continue Eliquis 5 mg bid  -[id number] " Score 2     HTN  Chronic and stable  Is in good control here at my clinic  -Continue amlodipine 10 mg daily and lisinopril 20 mg daily     Morbid obesity, BMI 54.7  Lymphedema  Chronic and unstable  -put in referral for nutritional services  Patient saw cardiology  -Discussed diet changes and encouraged weight loss  -Patient motivated to start weight loss program in January  -Not interested in bariatric surgery currently  -Recommend MANUEL hose to help with lymphedema as well as drastic weight loss  -elevate legs when able, consider lymphedema pump  -Referral to sleep medicine for EVERARDO assessment    Form completion  Patient brought in DMV form.  She gets short of breath while walking.  I stated I would fill this out for 6 months only.  She needs to follow-up with nutritionist and lose weight.    In the clinic today, hemoglobin A1c was 5.5% today.  Discussed continued decrease in carbohydrate consumption      Followup: 6 months    Please note that this dictation was created using voice recognition software. I have made every reasonable attempt to correct obvious errors, but I expect that there are errors of grammar and possibly content that I did not discover before finalizing the note.                DAILY PROGRESS NOTE  ===========================================================    Patient Information:  JOSÉ MANUEL PORTER  /  73y  /  Male  /  MRN#: [id number]    Patient is a 73y old Male who presents with a chief complaint of DFU (20 May 2023 11:10)      Hospital Day: 7d     |:::::::::::::::::::::::::::| SUBJECTIVE |:::::::::::::::::::::::::::|    OVERNIGHT EVENTS: none.  TODAY: Patient was seen today at bedside. Review of systems is otherwise negative.    |:::::::::::::::::::::::::::| OBJECTIVE |:::::::::::::::::::::::::::|    VITAL SIGNS: Last 24 Hours  T(C): 36.3 (23 May 2023 04:50), Max: 36.5 (22 May 2023 13:19)  T(F): 97.3 (23 May 2023 04:50), Max: 97.7 (22 May 2023 13:19)  HR: 60 (23 May 2023 04:50) (60 - 68)  BP: 150/71 (23 May 2023 04:50) (139/65 - 194/84)  BP(mean): --  RR: 18 (23 May 2023 04:50) (18 - 18)  SpO2: 100% (23 May 2023 04:50) (98% - 100%)    PHYSICAL EXAM:  GENERAL: Awake, alert; NAD.  HEENT: Head NC/AT  CARDIO: Regular rate; Regular rhythm; S1 & S2.  RESP: No rales, wheezing, or rhonchi appreciated.  GI: Soft; NT/ND; BS; No guarding; No rebound tenderness.  EXT: RLE dressed, c/d/i   SKIN: Intact, no rashes, no erythema    LAB RESULTS:                        13.2   6.55  )-----------( 257      ( 23 May 2023 05:36 )             39.9     05-22    139  |  104  |  18  ----------------------------<  181<H>  4.1   |  27  |  0.9    Ca    8.8      22 May 2023 07:29                  MICROBIOLOGY:    RADIOLOGY:    ALLERGIES:  No Known Allergies    MEDICATIONS:  acetaminophen     Tablet .. 650 milliGRAM(s) Oral every 6 hours PRN  amoxicillin  875 milliGRAM(s)/clavulanate 1 Tablet(s) Oral every 12 hours  budesonide  80 MICROgram(s)/formoterol 4.5 MICROgram(s) Inhaler 2 Puff(s) Inhalation two times a day  dextrose 5%. 1000 milliLiter(s) IV Continuous <Continuous>  dextrose 5%. 1000 milliLiter(s) IV Continuous <Continuous>  dextrose 50% Injectable 25 Gram(s) IV Push once  dextrose 50% Injectable 12.5 Gram(s) IV Push once  dextrose 50% Injectable 25 Gram(s) IV Push once  dextrose Oral Gel 15 Gram(s) Oral once PRN  enoxaparin Injectable 40 milliGRAM(s) SubCutaneous every 24 hours  glucagon  Injectable 1 milliGRAM(s) IntraMuscular once  hydrALAZINE 25 milliGRAM(s) Oral every 6 hours PRN  insulin glargine Injectable (LANTUS) 10 Unit(s) SubCutaneous at bedtime  insulin lispro (ADMELOG) corrective regimen sliding scale   SubCutaneous three times a day before meals  insulin lispro Injectable (ADMELOG) 3 Unit(s) SubCutaneous three times a day before meals  lacosamide 100 milliGRAM(s) Oral two times a day  levETIRAcetam 750 milliGRAM(s) Oral two times a day  melatonin 3 milliGRAM(s) Oral at bedtime PRN  metoprolol tartrate 25 milliGRAM(s) Oral two times a day  ofloxacin 0.3% Solution 1 Drop(s) Right EYE four times a day  pantoprazole    Tablet 40 milliGRAM(s) Oral before breakfast  prednisoLONE acetate 1% Suspension 1 Drop(s) Right EYE four times a day    ===========================================================

## 2023-05-23 NOTE — DISCHARGE NOTE PROVIDER - HOSPITAL COURSE
72yo man pmhx DM 2, hx of osteomyelitis, gerd, bipolar depression, HLD, HTN, presents from podiatry office for surgical debridement of R foot ulcer.     Per pt, some pain, but no fevers, n/v/d, no cp, sob, st;  endorses being in his usual state of health outside of some manageable pain.    ED  /77 HR 88 RR 16 99% T 98.6F  WBC 10.53 Hg 12.7 Plt 183 BUN 22 Cr 1.3    Given cef, flag, vanc and admitted to medicine.    Hospital course:    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  - s/p ceftriaxone and flagyl in ED  - s/p debridement  - BCx - NGTD  - PT following, heel touch  - OR cultures - Moderate Escherichia coli and Few Streptococcus mitis/oralis   - will dc on Augmentin 865mg BID, wound vac to be placed as o/p    #DMT2  - A1C 7.3%  - FS have been high  - Started on 10u Lantus and 3u Lispro premeal w/ ISS  - monitor FS, adjust PRN    #HTN  #HLD  - not on statin  - lipid profile wnl  - c/w Metoprolol 25mg BID  - adjust BP meds PRN    #Asthma  - not in exacerbation  - c/w inhalers    #Seizure Disorder  - c/w home Keppra 750mg BID  - c/w home lacosamide 100mg BID    #HO Bipolar Depression  - not on meds  - not in manic or depression state    #GERD  - therapeutic exchange for home omeprazole    #s/p R cataract surgery  - c/w Prednisolone 1% drops of right eye QID  - c/w ofloxacin 0.3%, 1 drop in right eye QID      Patient to be discharge on a course of Augmentin PO 875mg BID for 7 days.

## 2023-05-23 NOTE — PROGRESS NOTE ADULT - PROVIDER SPECIALTY LIST ADULT
Internal Medicine
Internal Medicine
Podiatry
Podiatry
Hospitalist
Internal Medicine

## 2023-05-23 NOTE — DISCHARGE NOTE PROVIDER - NSDCFUSCHEDAPPT_GEN_ALL_CORE_FT
[facility] Physician [facility]  CARDIOLOGY [address]  Scheduled Appointment: 06/07/2023    [facility] PreAdmits  Scheduled Appointment: 06/21/2023    [facility] [address]  Scheduled Appointment: 06/21/2023

## 2023-05-23 NOTE — DISCHARGE NOTE PROVIDER - NSDCCPCAREPLAN_GEN_ALL_CORE_FT
PRINCIPAL DISCHARGE DIAGNOSIS  Diagnosis: Diabetic foot ulcer  Assessment and Plan of Treatment: A diabetic foot ulcer can be redness over a bony area or an open sore. The ulcer can develop anywhere on your foot or toes. Ulcers usually develop on the bottom of the foot. You may not know you have an ulcer until you notice drainage on your sock. Drainage is fluid that may be yellow, brown, or red. The fluid may also contain pus or blood.  Foot Ulcers  What increases my risk for a diabetic foot ulcer?  Blood sugar levels that are not controlled  Nerve damage and numbness in your feet  Poor blood flow  A foot deformity, such as a bunion or hammertoe  Calluses or corns on your feet or toes  A decrease in vision that keeps you from seeing your feet clearly  Being overweight  Cigarette smoking or alcohol use.  You were treated for your foot ulcer with  debridement procedure. You are being discharge on oral antibiotics for one week's duration. Please follow up with your healthcare team for further monitoring and management.

## 2023-05-23 NOTE — DISCHARGE NOTE PROVIDER - NSDCMRMEDTOKEN_GEN_ALL_CORE_FT
Actos 15 mg oral tablet: 1 orally once a day  albuterol 90 mcg/inh inhalation aerosol: 2 puff(s) inhaled every 6 hours  amoxicillin-clavulanate 875 mg-125 mg oral tablet: 1 tab(s) orally every 12 hours  aspirin 81 mg oral tablet, chewable: 1 tab(s) orally once a day; take with food  Incruse Ellipta 62.5 mcg/inh inhalation powder: 1 inhaler(s) inhaled once a day   Janumet 50 mg-1000 mg oral tablet: 1 tab(s) orally 2 times a day with meals (with breakfast and with dinner)  Jardiance 25 mg oral tablet: 1 orally once a day  lacosamide 100 mg oral tablet: 1 tab(s) orally every 12 hours MDD:200 mg  levETIRAcetam 750 mg oral tablet: 1 orally 2 times a day  ofloxacin 0.3% ophthalmic solution: 1 in the right eye 4 times a day  omeprazole 20 mg oral delayed release capsule: 1 cap(s) orally once a day in the morning, take 30 minutes before breakfast  prednisoLONE sodium phosphate 1% ophthalmic solution: 1 in the right eye 4 times a day  Trulicity Pen 3 mg/0.5 mL subcutaneous solution: 3 subcutaneously every 7 days

## 2023-05-23 NOTE — DISCHARGE NOTE PROVIDER - CARE PROVIDER_API CALL
Shane Garnett  Internal Medicine  [address] [place] [address]  Phone: [phone]  Fax: [phone]  Follow Up Time: Routine

## 2023-05-24 ENCOUNTER — APPOINTMENT (OUTPATIENT)
Dept: NEUROLOGY | Facility: CLINIC | Age: 73
End: 2023-05-24

## 2023-06-01 ENCOUNTER — APPOINTMENT (OUTPATIENT)
Dept: PODIATRY | Facility: CLINIC | Age: 73
End: 2023-06-01
Payer: MEDICARE

## 2023-06-01 ENCOUNTER — OUTPATIENT (OUTPATIENT)
Dept: OUTPATIENT SERVICES | Facility: HOSPITAL | Age: 73
LOS: 1 days | End: 2023-06-01
Payer: MEDICARE

## 2023-06-01 DIAGNOSIS — Z89.421 ACQUIRED ABSENCE OF OTHER RIGHT TOE(S): Chronic | ICD-10-CM

## 2023-06-01 DIAGNOSIS — Z00.00 ENCOUNTER FOR GENERAL ADULT MEDICAL EXAMINATION WITHOUT ABNORMAL FINDINGS: ICD-10-CM

## 2023-06-01 DIAGNOSIS — Z89.429 ACQUIRED ABSENCE OF OTHER TOE(S), UNSPECIFIED SIDE: Chronic | ICD-10-CM

## 2023-06-01 PROCEDURE — 99213 OFFICE O/P EST LOW 20 MIN: CPT

## 2023-06-01 NOTE — ASSESSMENT
[FreeTextEntry1] : -d/c wound vac\par -might debridement of plantar ulcer\par -lwc with aquacell q48h\par -return 2 weeks

## 2023-06-01 NOTE — PHYSICAL EXAM
[General Appearance - Alert] : alert [General Appearance - In No Acute Distress] : in no acute distress [Ankle Swelling On The Right] : of the right ankle [Varicose Veins Of The Right Leg] : on the right foot/ankle [1+] : right foot dorsalis pedis 1+ [0] : left foot dorsalis pedis 0 [Ankle Swelling (On Exam)] : not present [Varicose Veins Of Lower Extremities] : not present [] : not present [FreeTextEntry1] : plantar ulcer healing well. no probe to bone. measures 4mm x 8mm. no acute signs of infection\par dorsal surgical wound. superficial. no acute signs of infection\par

## 2023-06-01 NOTE — HISTORY OF PRESENT ILLNESS
[FreeTextEntry1] : 72 y/o diabetic male s/p right foot ulcer debridement w/ removal of staples 5/18/2023. Here today for follow up wound care

## 2023-06-02 DIAGNOSIS — L97.519 NON-PRESSURE CHRONIC ULCER OF OTHER PART OF RIGHT FOOT WITH UNSPECIFIED SEVERITY: ICD-10-CM

## 2023-06-07 ENCOUNTER — NON-APPOINTMENT (OUTPATIENT)
Age: 73
End: 2023-06-07

## 2023-06-07 ENCOUNTER — APPOINTMENT (OUTPATIENT)
Dept: CARDIOLOGY | Facility: CLINIC | Age: 73
End: 2023-06-07
Payer: MEDICARE

## 2023-06-07 PROCEDURE — 93298 REM INTERROG DEV EVAL SCRMS: CPT

## 2023-06-07 PROCEDURE — G2066: CPT

## 2023-06-17 ENCOUNTER — EMERGENCY (EMERGENCY)
Facility: HOSPITAL | Age: 73
LOS: 0 days | Discharge: HOME | End: 2023-06-17
Attending: STUDENT IN AN ORGANIZED HEALTH CARE EDUCATION/TRAINING PROGRAM
Payer: MEDICARE

## 2023-06-17 VITALS
RESPIRATION RATE: 16 BRPM | SYSTOLIC BLOOD PRESSURE: 156 MMHG | OXYGEN SATURATION: 98 % | HEART RATE: 100 BPM | DIASTOLIC BLOOD PRESSURE: 72 MMHG | WEIGHT: 195.99 LBS | TEMPERATURE: 99 F | HEIGHT: 71 IN

## 2023-06-17 DIAGNOSIS — Z87.39 PERSONAL HISTORY OF OTHER DISEASES OF THE MUSCULOSKELETAL SYSTEM AND CONNECTIVE TISSUE: ICD-10-CM

## 2023-06-17 DIAGNOSIS — K21.9 GASTRO-ESOPHAGEAL REFLUX DISEASE WITHOUT ESOPHAGITIS: ICD-10-CM

## 2023-06-17 DIAGNOSIS — E78.5 HYPERLIPIDEMIA, UNSPECIFIED: ICD-10-CM

## 2023-06-17 DIAGNOSIS — E11.40 TYPE 2 DIABETES MELLITUS WITH DIABETIC NEUROPATHY, UNSPECIFIED: ICD-10-CM

## 2023-06-17 DIAGNOSIS — Z89.421 ACQUIRED ABSENCE OF OTHER RIGHT TOE(S): Chronic | ICD-10-CM

## 2023-06-17 DIAGNOSIS — Z89.429 ACQUIRED ABSENCE OF OTHER TOE(S), UNSPECIFIED SIDE: Chronic | ICD-10-CM

## 2023-06-17 DIAGNOSIS — E11.621 TYPE 2 DIABETES MELLITUS WITH FOOT ULCER: ICD-10-CM

## 2023-06-17 DIAGNOSIS — F31.9 BIPOLAR DISORDER, UNSPECIFIED: ICD-10-CM

## 2023-06-17 DIAGNOSIS — Z79.82 LONG TERM (CURRENT) USE OF ASPIRIN: ICD-10-CM

## 2023-06-17 DIAGNOSIS — I10 ESSENTIAL (PRIMARY) HYPERTENSION: ICD-10-CM

## 2023-06-17 LAB
ALBUMIN SERPL ELPH-MCNC: 4.1 G/DL — SIGNIFICANT CHANGE UP (ref 3.5–5.2)
ALP SERPL-CCNC: 105 U/L — SIGNIFICANT CHANGE UP (ref 30–115)
ALT FLD-CCNC: 27 U/L — SIGNIFICANT CHANGE UP (ref 0–41)
ANION GAP SERPL CALC-SCNC: 10 MMOL/L — SIGNIFICANT CHANGE UP (ref 7–14)
ANISOCYTOSIS BLD QL: SIGNIFICANT CHANGE UP
AST SERPL-CCNC: 27 U/L — SIGNIFICANT CHANGE UP (ref 0–41)
BASE EXCESS BLDV CALC-SCNC: 4.1 MMOL/L — HIGH (ref -2–3)
BASOPHILS # BLD AUTO: 0 K/UL — SIGNIFICANT CHANGE UP (ref 0–0.2)
BASOPHILS NFR BLD AUTO: 0 % — SIGNIFICANT CHANGE UP (ref 0–1)
BILIRUB DIRECT SERPL-MCNC: 0.2 MG/DL — SIGNIFICANT CHANGE UP (ref 0–0.3)
BILIRUB INDIRECT FLD-MCNC: 0.5 MG/DL — SIGNIFICANT CHANGE UP (ref 0.2–1.2)
BILIRUB SERPL-MCNC: 0.7 MG/DL — SIGNIFICANT CHANGE UP (ref 0.2–1.2)
BUN SERPL-MCNC: 15 MG/DL — SIGNIFICANT CHANGE UP (ref 10–20)
CA-I SERPL-SCNC: 1.21 MMOL/L — SIGNIFICANT CHANGE UP (ref 1.15–1.33)
CALCIUM SERPL-MCNC: 9 MG/DL — SIGNIFICANT CHANGE UP (ref 8.4–10.5)
CHLORIDE SERPL-SCNC: 103 MMOL/L — SIGNIFICANT CHANGE UP (ref 98–110)
CO2 SERPL-SCNC: 27 MMOL/L — SIGNIFICANT CHANGE UP (ref 17–32)
CREAT SERPL-MCNC: 1.1 MG/DL — SIGNIFICANT CHANGE UP (ref 0.7–1.5)
EGFR: 70 ML/MIN/1.73M2 — SIGNIFICANT CHANGE UP
EOSINOPHIL # BLD AUTO: 0 K/UL — SIGNIFICANT CHANGE UP (ref 0–0.7)
EOSINOPHIL NFR BLD AUTO: 0 % — SIGNIFICANT CHANGE UP (ref 0–8)
GAS PNL BLDV: 135 MMOL/L — LOW (ref 136–145)
GAS PNL BLDV: SIGNIFICANT CHANGE UP
GAS PNL BLDV: SIGNIFICANT CHANGE UP
GIANT PLATELETS BLD QL SMEAR: PRESENT — SIGNIFICANT CHANGE UP
GLUCOSE SERPL-MCNC: 238 MG/DL — HIGH (ref 70–99)
HCO3 BLDV-SCNC: 30 MMOL/L — HIGH (ref 22–29)
HCT VFR BLD CALC: 36.6 % — LOW (ref 42–52)
HCT VFR BLDA CALC: 37 % — LOW (ref 39–51)
HGB BLD CALC-MCNC: 12.3 G/DL — LOW (ref 12.6–17.4)
HGB BLD-MCNC: 12 G/DL — LOW (ref 14–18)
LACTATE BLDV-MCNC: 1 MMOL/L — SIGNIFICANT CHANGE UP (ref 0.5–2)
LYMPHOCYTES # BLD AUTO: 0.45 K/UL — LOW (ref 1.2–3.4)
LYMPHOCYTES # BLD AUTO: 3.5 % — LOW (ref 20.5–51.1)
MACROCYTES BLD QL: SIGNIFICANT CHANGE UP
MANUAL SMEAR VERIFICATION: SIGNIFICANT CHANGE UP
MCHC RBC-ENTMCNC: 29.9 PG — SIGNIFICANT CHANGE UP (ref 27–31)
MCHC RBC-ENTMCNC: 32.8 G/DL — SIGNIFICANT CHANGE UP (ref 32–37)
MCV RBC AUTO: 91.3 FL — SIGNIFICANT CHANGE UP (ref 80–94)
MONOCYTES # BLD AUTO: 0.55 K/UL — SIGNIFICANT CHANGE UP (ref 0.1–0.6)
MONOCYTES NFR BLD AUTO: 4.3 % — SIGNIFICANT CHANGE UP (ref 1.7–9.3)
NEUTROPHILS # BLD AUTO: 11.82 K/UL — HIGH (ref 1.4–6.5)
NEUTROPHILS NFR BLD AUTO: 92.2 % — HIGH (ref 42.2–75.2)
PCO2 BLDV: 51 MMHG — SIGNIFICANT CHANGE UP (ref 42–55)
PH BLDV: 7.38 — SIGNIFICANT CHANGE UP (ref 7.32–7.43)
PLAT MORPH BLD: NORMAL — SIGNIFICANT CHANGE UP
PLATELET # BLD AUTO: 236 K/UL — SIGNIFICANT CHANGE UP (ref 130–400)
PMV BLD: 10.5 FL — HIGH (ref 7.4–10.4)
PO2 BLDV: 24 MMHG — SIGNIFICANT CHANGE UP
POLYCHROMASIA BLD QL SMEAR: SIGNIFICANT CHANGE UP
POTASSIUM BLDV-SCNC: 4.4 MMOL/L — SIGNIFICANT CHANGE UP (ref 3.5–5.1)
POTASSIUM SERPL-MCNC: 4.6 MMOL/L — SIGNIFICANT CHANGE UP (ref 3.5–5)
POTASSIUM SERPL-SCNC: 4.6 MMOL/L — SIGNIFICANT CHANGE UP (ref 3.5–5)
PROT SERPL-MCNC: 6.5 G/DL — SIGNIFICANT CHANGE UP (ref 6–8)
RBC # BLD: 4.01 M/UL — LOW (ref 4.7–6.1)
RBC # FLD: 13.2 % — SIGNIFICANT CHANGE UP (ref 11.5–14.5)
RBC BLD AUTO: ABNORMAL
SAO2 % BLDV: 40 % — SIGNIFICANT CHANGE UP
SODIUM SERPL-SCNC: 140 MMOL/L — SIGNIFICANT CHANGE UP (ref 135–146)
WBC # BLD: 12.82 K/UL — HIGH (ref 4.8–10.8)
WBC # FLD AUTO: 12.82 K/UL — HIGH (ref 4.8–10.8)

## 2023-06-17 PROCEDURE — 80048 BASIC METABOLIC PNL TOTAL CA: CPT

## 2023-06-17 PROCEDURE — 36415 COLL VENOUS BLD VENIPUNCTURE: CPT

## 2023-06-17 PROCEDURE — 82330 ASSAY OF CALCIUM: CPT

## 2023-06-17 PROCEDURE — 73630 X-RAY EXAM OF FOOT: CPT | Mod: RT

## 2023-06-17 PROCEDURE — 85025 COMPLETE CBC W/AUTO DIFF WBC: CPT

## 2023-06-17 PROCEDURE — 85014 HEMATOCRIT: CPT

## 2023-06-17 PROCEDURE — 82962 GLUCOSE BLOOD TEST: CPT

## 2023-06-17 PROCEDURE — 99285 EMERGENCY DEPT VISIT HI MDM: CPT | Mod: GC

## 2023-06-17 PROCEDURE — 73630 X-RAY EXAM OF FOOT: CPT | Mod: 26,RT

## 2023-06-17 PROCEDURE — 85018 HEMOGLOBIN: CPT

## 2023-06-17 PROCEDURE — 80076 HEPATIC FUNCTION PANEL: CPT

## 2023-06-17 PROCEDURE — 84132 ASSAY OF SERUM POTASSIUM: CPT

## 2023-06-17 PROCEDURE — 84295 ASSAY OF SERUM SODIUM: CPT

## 2023-06-17 PROCEDURE — 99284 EMERGENCY DEPT VISIT MOD MDM: CPT | Mod: 25

## 2023-06-17 PROCEDURE — 82803 BLOOD GASES ANY COMBINATION: CPT

## 2023-06-17 PROCEDURE — 83605 ASSAY OF LACTIC ACID: CPT

## 2023-06-17 RX ORDER — FAMOTIDINE 10 MG/ML
20 INJECTION INTRAVENOUS ONCE
Refills: 0 | Status: DISCONTINUED | OUTPATIENT
Start: 2023-06-17 | End: 2023-06-17

## 2023-06-17 RX ORDER — KETOROLAC TROMETHAMINE 30 MG/ML
15 SYRINGE (ML) INJECTION ONCE
Refills: 0 | Status: DISCONTINUED | OUTPATIENT
Start: 2023-06-17 | End: 2023-06-17

## 2023-06-17 RX ORDER — SODIUM CHLORIDE 9 MG/ML
1000 INJECTION, SOLUTION INTRAVENOUS ONCE
Refills: 0 | Status: DISCONTINUED | OUTPATIENT
Start: 2023-06-17 | End: 2023-06-17

## 2023-06-17 NOTE — ED PROVIDER NOTE - ATTENDING CONTRIBUTION TO CARE
73-year-old male past medical history significant for osteomyelitis GERD bipolar disorder depression hyperlipidemia hypertension who presents with concern for infection.  As per patient  was visited by nursing who today for wound check.  And was sent to the ED due to concerns for possible infection.  Patient denies any fevers chills nausea vomiting or any other medical complaints.    VITAL SIGNS: I have reviewed nursing notes and confirm.  CONSTITUTIONAL: non-toxic, well appearing  SKIN: no rash, no petechiae.  EYES: EOMI, pink conjunctiva, anicteric  ENT: tongue midline, no exudates, MMM  NECK: Supple; no meningismus, no JVD  CARD: RRR, no murmurs, equal radial pulses bilaterally 2+  RESP: CTAB, no respiratory distress  ABD: Soft, non-tender, non-distended, no peritoneal signs, no HSM, no CVA tenderness  EXT; +R foot in dressing - s/p amputation of toes, DFU on sole near the first distal metatarsal with packing in place, the dorsal surface near the distal metatarsal with serosanguinous discharge; re-dressed    73 yr old m that presents with concern for infection. labs, imaging, podiatry, reassess. dispo pending.

## 2023-06-17 NOTE — ED PROVIDER NOTE - OBJECTIVE STATEMENT
Spoke with Lubna from [facility] and OK to hold Eliquis for 48 hours prior to the EGD.  Letter updates and faxed.   Patient is a 73-year-old man with a history of diabetes, hyperlipidemia, hypertension, bipolar disorder, GERD, osteomyelitis in the past, presenting for evaluation of right diabetic foot ulcer following up outpatient with podiatry Dr. Bhatia.  Patient receives dressing changes by visiting nurse daily.  Today the nurse expressed concern, and advised the patient to be evaluated. Patient ambulates with a walker. Denies worsening pain. No fever/chills, CP, SOB, abd pain, NVD.

## 2023-06-17 NOTE — ED PROVIDER NOTE - CLINICAL SUMMARY MEDICAL DECISION MAKING FREE TEXT BOX
73 yr old m that presents with concern for infection. labs, imaging, podiatry. pt evaluated and cleared by podiatry for discharge. Labs  were ordered and reviewed.  Imaging was ordered and reviewed by me.  Appropriate medications for patient's presenting complaints were ordered and effects were reassessed.  Patient's records (prior hospital, ED visit, and/or nursing home notes if available) were reviewed.  Additional history was obtained from EMS, family, and/or PCP (where available).  Escalation to admission/observation was considered.  However patient feels much better and is comfortable with discharge.  Appropriate follow-up was arranged.     I have discussed the discharge plan with the patient. The patient agrees with the plan, as discussed.  The patient understands Emergency Department diagnosis is a preliminary diagnosis often based on limited information and that the patient must adhere to the follow-up plan as discussed.  The patient understands that if the symptoms worsen or if prescribed medications do not have the desired/planned effect that the patient may return to the Emergency Department at any time for further evaluation and treatment.

## 2023-06-17 NOTE — CONSULT NOTE ADULT - SUBJECTIVE AND OBJECTIVE BOX
Podiatry Consult Note    Subjective:  JOSÉ MANUEL PORTER  Seen Bedside 73y Male  .   Patient is a 73y old  Male who presents with a chief complaint of   HPI:      Past Medical History and Surgical History  PAST MEDICAL & SURGICAL HISTORY:  DM (diabetes mellitus)  Type 2, 12/27/18 hgb aic  11.2      HTN (hypertension)      Dyslipidemia      Diabetic foot ulcer      Depression      GERD (gastroesophageal reflux disease)      Neuropathy, diabetic      Toe amputation status, right  (2008)      History of amputation of toe  LT -partial 1st toe           Review of Systems:  [X] Ten point review of systems is otherwise negative except as noted     Objective:  Vital Signs Last 24 Hrs  T(C): 37.1 (17 Jun 2023 18:47), Max: 37.1 (17 Jun 2023 18:47)  T(F): 98.7 (17 Jun 2023 18:47), Max: 98.7 (17 Jun 2023 18:47)  HR: 100 (17 Jun 2023 18:47) (100 - 100)  BP: 156/72 (17 Jun 2023 18:47) (156/72 - 156/72)  BP(mean): --  RR: 16 (17 Jun 2023 18:47) (16 - 16)  SpO2: 98% (17 Jun 2023 18:47) (98% - 98%)    Parameters below as of 17 Jun 2023 18:47  Patient On (Oxygen Delivery Method): room air                            12.0   12.82 )-----------( 236      ( 17 Jun 2023 21:32 )             36.6                 06-17    140  |  103  |  15  ----------------------------<  238<H>  4.6   |  27  |  1.1    Ca    9.0      17 Jun 2023 21:32    TPro  6.5  /  Alb  4.1  /  TBili  0.7  /  DBili  0.2  /  AST  27  /  ALT  27  /  AlkPhos  105  06-17        Physical Exam - Right Lower Extremity Focused:   Derm: Plantar right foot ulcer with granular tissue, deep to bone, mild macerated borders, no purulence or malodor noted, Mild serosanguinous drainage.  Vascular: DP and PT Pulses Diminished; Foot is Warm to Warm to the touch; Capillary Refill Time < 3 Seconds;    Neuro: Protective Sensation Diminished / Moderately Neuropathic   MSK: minimal Pain On Palpation at Wound Site     Assessment:  Right DFU;     Plan:  Chart reviewed and Patient evaluated. All Questions and Concerns Addressed and Answered  XR_RightFoot; Pending;  Local Wound Care; Wound Packed w/ iodoform packing / DSD / Kerlix / ACE   Weight Bearing Status; WBAT   Continue w/ Local Wound Care; Q24 Dressing Changes;  Patient Stable Per Podiatry Standpoint;  Follow Up as an Outpatient w/ Dr. Bhatia @ [address] [place] [address]  Discussed Plan w/ Attending; Dr. Bhatia    Podiatry

## 2023-06-17 NOTE — ED ADULT TRIAGE NOTE - CHIEF COMPLAINT QUOTE
pt sent to ED by visiting RN service for wound check to his right foot, states he recently had surgery but believes the wound may be infected

## 2023-06-17 NOTE — ED PROVIDER NOTE - WR INTERPRETATION 1
Rx Refill Note  Requested Prescriptions     Pending Prescriptions Disp Refills   • clopidogrel (PLAVIX) 75 MG tablet [Pharmacy Med Name: CLOPIDOGREL 75 MG TABLET] 90 tablet 1     Sig: TAKE 1 TABLET BY MOUTH EVERY DAY      Last office visit with prescribing clinician: 8/25/2021      Next office visit with prescribing clinician: Visit date not found            Arsen Alberts MA  12/08/21, 08:52 EST  
S/p toe amputation. No signs of worsening infection.

## 2023-06-17 NOTE — ED ADULT NURSE NOTE - EXTENSIONS OF SELF_ADULT
Patient in office to follow up with wart on bottom of right foot. has been using salinocaine. Peewee Ruiz : 1960 Sex: female  Age: 59 y.o. Patient was referred by Annia Alonzo MD    CC:    Follow-up plantar wart right foot        HPI:   Follow-up plantar wart right foot. Has been using Salinocaine once daily. Denies any pain right foot today. No open skin lesion abrasions. No recent injuries. No additional pedal complaints. ROS:  Const: Denies constitutional symptoms  Musculo: Denies symptoms other than stated above  Skin: Denies symptoms other than stated above       Current Outpatient Medications:     lisinopril (PRINIVIL;ZESTRIL) 5 MG tablet, Take 1 tablet by mouth daily, Disp: 90 tablet, Rfl: 1    spironolactone (ALDACTONE) 50 MG tablet, Take 1 tablet by mouth daily, Disp: 90 tablet, Rfl: 1    vitamin B-12 (CYANOCOBALAMIN) 1000 MCG tablet, Take by mouth, Disp: , Rfl:   No Known Allergies    Past Medical History:   Diagnosis Date    Anxiety 2019    Depression     Essential hypertension 2019           There were no vitals filed for this visit. Work History/Social History:     Focused Lower Extremity Physical Exam:    Neurovascular examination:    Dorsalis Pedis palpable bilateral.  Posterior tibialis palpable bilateral.    Capillary Refill Time:  Immediate return  Hair growth:  Symmetrical and bilateral   Skin:  Not atrophic  Edema: No edema bilateral feet or ankles. Neurologic:  Light touch intact bilateral.      Musculoskeletal/ Orthopedic examination:    Equinis: Absent bilateral  Dorsiflexion, plantarflexion, inversion, eversion bilateral 5 out of 5 muscle strength  Wiggling toes  Negative Homans  No pain to palpation plantar right foot. Dermatology examination:    Hyperkeratotic tissue plantar second, third and fourth metatarsal head right foot. After paring no black foci. No plantar verruca noted. No open wounds.   Assessment and Plan:  Angeline was seen today for follow-up and verruca vulgaris. Diagnoses and all orders for this visit:    Plantar verruca    Pain in right foot    Corns and callosities        Return if symptoms worsen or fail to improve. Follow-up plantar wart right foot  Mild hyperkeratotic tissue plantar right foot I did use a #15 blade to pare hyperkeratotic tissue. No open wounds or plantar verruca noted today. I did recommend transition to regular lotion once daily both feet. Any recurrence of plantar warts come in sooner. Pain-free progressing well. Follow-up as needed. Seen By:  Junie Spann DPM      Document was created using voice recognition software. Note was reviewed, however may contain grammatical errors. None

## 2023-06-17 NOTE — ED PROVIDER NOTE - NSFOLLOWUPINSTRUCTIONS_ED_ALL_ED_FT
CONTINUE CURRENT CARE FOR THE ULCER, AND FOLLOWING UP WITH PODIATRY.   ------------------------------------------------------------------------------------------------------------------------  Diabetes Mellitus and Foot Care  ------------------------------------------------------------------------------------------------------------------------  Foot care is an important part of your health, especially when you have diabetes. Diabetes may cause you to have problems because of poor blood flow (circulation) to your feet and legs, which can cause your skin to:  Become thinner and drier.  Break more easily.  Heal more slowly.  Peel and crack.  You may also have nerve damage (neuropathy) in your legs and feet, causing decreased feeling in them. This means that you may not notice minor injuries to your feet that could lead to more serious problems. Noticing and addressing any potential problems early is the best way to prevent future foot problems.  ------------------------------------------------------------------------------------------------------------------------  Wear clean socks or stockings every day. Make sure they are not too tight. ------------------------------------------------------------------------------------------------------------------------  Check your feet daily for blisters, cuts, bruises, sores, and redness. If you cannot see the bottom of your feet, use a mirror or ask someone for help.  ------------------------------------------------------------------------------------------------------------------------  If you have a wound, scrape, corn, or callus on your foot, look at it several times a day to make sure it is healing and not infected. Check for:  Redness, swelling, or pain.  Fluid or blood.  Warmth.  Pus or a bad smell.  ------------------------------------------------------------------------------------------------------------------------  Contact a health care provider if:  You have a medical condition that increases your risk of infection and you have any cuts, sores, or bruises on your feet.  You have an injury that is not healing.  You have redness on your legs or feet.  You feel burning or tingling in your legs or feet.  You have pain or cramps in your legs and feet.  Your legs or feet are numb.  Your feet always feel cold.  You have pain around any toenails.  ------------------------------------------------------------------------------------------------------------------------  Get help right away if:  You have a wound, scrape, corn, or callus on your foot and:  You have pain, swelling, or redness that gets worse.  You have fluid or blood coming from the wound, scrape, corn, or callus.  Your wound, scrape, corn, or callus feels warm to the touch.  You have pus or a bad smell coming from the wound, scrape, corn, or callus.  You have a fever.  You have a red line going up your leg.  ------------------------------------------------------------------------------------------------------------------------  Summary  Check your feet every day for blisters, cuts, bruises, sores, and redness.  Apply a moisturizing lotion or petroleum jelly to the skin on your feet and to dry, brittle toenails.  Wear shoes that fit properly and have enough cushioning.  If you have foot problems, report any cuts, sores, or bruises to your health care provider immediately.  Schedule a complete foot exam at least once a year (annually) or more often if you have foot problems.

## 2023-06-17 NOTE — ED ADULT NURSE NOTE - NSFALLRISKINTERV_ED_ALL_ED

## 2023-06-17 NOTE — ED PROVIDER NOTE - PHYSICAL EXAMINATION
_  Vital signs reviewed; ABCs intact  GENERAL: Well nourished, comfortable  SKIN: Warm, dry  HEAD & NECK: NCAT, supple neck  EYES: EOMI, PER B/L, no scleral icterus, no conjunctival injection, no conjunctival pallor  ENT: MMM; uvula midline; no oropharyngeal erythema or exudates  CARD: RRR, S1, S2; no murmurs, no rubs, no gallops  RESP: Normal respiratory effort, CTAB, no rales, no wheezing  ABD: Soft, ND, NT, no rebound, no guarding, +BS; no CVAT  NEUROMSK: +R foot in dressing - s/p amputation of toes, DFU on sole near the first distal metatarsal with packing in place, the dorsal surface near the distal metatarsal with serosanguinous discharge; re-dressed  PSYCH: AAOx3, cooperative, appropriate

## 2023-06-17 NOTE — ED PROVIDER NOTE - PATIENT PORTAL LINK FT
You can access the FollowMyHealth Patient Portal offered by [facility] by registering at the following website: [email or web address]. By joining [employer]’s FollowMyHealth portal, you will also be able to view your health information using other applications (apps) compatible with our system.

## 2023-06-21 ENCOUNTER — APPOINTMENT (OUTPATIENT)
Dept: PODIATRY | Facility: CLINIC | Age: 73
End: 2023-06-21
Payer: MEDICARE

## 2023-06-21 ENCOUNTER — APPOINTMENT (OUTPATIENT)
Dept: ENDOCRINOLOGY | Facility: CLINIC | Age: 73
End: 2023-06-21

## 2023-06-21 ENCOUNTER — OUTPATIENT (OUTPATIENT)
Dept: OUTPATIENT SERVICES | Facility: HOSPITAL | Age: 73
LOS: 1 days | End: 2023-06-21

## 2023-06-21 ENCOUNTER — INPATIENT (INPATIENT)
Facility: HOSPITAL | Age: 73
LOS: 8 days | Discharge: HOME HEALTH | DRG: 629 | End: 2023-06-30
Attending: INTERNAL MEDICINE | Admitting: INTERNAL MEDICINE
Payer: MEDICARE

## 2023-06-21 ENCOUNTER — OUTPATIENT (OUTPATIENT)
Dept: OUTPATIENT SERVICES | Facility: HOSPITAL | Age: 73
LOS: 1 days | End: 2023-06-21
Payer: MEDICARE

## 2023-06-21 VITALS
RESPIRATION RATE: 18 BRPM | HEIGHT: 71 IN | SYSTOLIC BLOOD PRESSURE: 199 MMHG | HEART RATE: 97 BPM | DIASTOLIC BLOOD PRESSURE: 102 MMHG | WEIGHT: 195.99 LBS | TEMPERATURE: 98 F | OXYGEN SATURATION: 99 %

## 2023-06-21 DIAGNOSIS — E11.9 TYPE 2 DIABETES MELLITUS WITHOUT COMPLICATIONS: ICD-10-CM

## 2023-06-21 DIAGNOSIS — E11.621 TYPE 2 DIABETES MELLITUS WITH FOOT ULCER: ICD-10-CM

## 2023-06-21 DIAGNOSIS — Z00.00 ENCOUNTER FOR GENERAL ADULT MEDICAL EXAMINATION WITHOUT ABNORMAL FINDINGS: ICD-10-CM

## 2023-06-21 DIAGNOSIS — Z89.429 ACQUIRED ABSENCE OF OTHER TOE(S), UNSPECIFIED SIDE: Chronic | ICD-10-CM

## 2023-06-21 DIAGNOSIS — Z89.421 ACQUIRED ABSENCE OF OTHER RIGHT TOE(S): Chronic | ICD-10-CM

## 2023-06-21 DIAGNOSIS — L03.115 CELLULITIS OF RIGHT LOWER LIMB: ICD-10-CM

## 2023-06-21 DIAGNOSIS — E78.00 PURE HYPERCHOLESTEROLEMIA, UNSPECIFIED: ICD-10-CM

## 2023-06-21 LAB
ALBUMIN SERPL ELPH-MCNC: 3.9 G/DL — SIGNIFICANT CHANGE UP (ref 3.5–5.2)
ALP SERPL-CCNC: 239 U/L — HIGH (ref 30–115)
ALT FLD-CCNC: 132 U/L — HIGH (ref 0–41)
ANION GAP SERPL CALC-SCNC: 12 MMOL/L — SIGNIFICANT CHANGE UP (ref 7–14)
APTT BLD: 31.3 SEC — SIGNIFICANT CHANGE UP (ref 27–39.2)
AST SERPL-CCNC: 41 U/L — SIGNIFICANT CHANGE UP (ref 0–41)
BASOPHILS # BLD AUTO: 0.05 K/UL — SIGNIFICANT CHANGE UP (ref 0–0.2)
BASOPHILS NFR BLD AUTO: 0.5 % — SIGNIFICANT CHANGE UP (ref 0–1)
BILIRUB SERPL-MCNC: 0.5 MG/DL — SIGNIFICANT CHANGE UP (ref 0.2–1.2)
BUN SERPL-MCNC: 12 MG/DL — SIGNIFICANT CHANGE UP (ref 10–20)
CALCIUM SERPL-MCNC: 9.5 MG/DL — SIGNIFICANT CHANGE UP (ref 8.4–10.5)
CHLORIDE SERPL-SCNC: 102 MMOL/L — SIGNIFICANT CHANGE UP (ref 98–110)
CO2 SERPL-SCNC: 26 MMOL/L — SIGNIFICANT CHANGE UP (ref 17–32)
CREAT SERPL-MCNC: 0.9 MG/DL — SIGNIFICANT CHANGE UP (ref 0.7–1.5)
EGFR: 89 ML/MIN/1.73M2 — SIGNIFICANT CHANGE UP
EOSINOPHIL # BLD AUTO: 0.07 K/UL — SIGNIFICANT CHANGE UP (ref 0–0.7)
EOSINOPHIL NFR BLD AUTO: 0.7 % — SIGNIFICANT CHANGE UP (ref 0–8)
GLUCOSE SERPL-MCNC: 147 MG/DL — HIGH (ref 70–99)
HCT VFR BLD CALC: 37.8 % — LOW (ref 42–52)
HGB BLD-MCNC: 12.4 G/DL — LOW (ref 14–18)
IMM GRANULOCYTES NFR BLD AUTO: 2.4 % — HIGH (ref 0.1–0.3)
INR BLD: 1.07 RATIO — SIGNIFICANT CHANGE UP (ref 0.65–1.3)
LIDOCAIN IGE QN: 107 U/L — HIGH (ref 7–60)
LYMPHOCYTES # BLD AUTO: 1.03 K/UL — LOW (ref 1.2–3.4)
LYMPHOCYTES # BLD AUTO: 9.8 % — LOW (ref 20.5–51.1)
MCHC RBC-ENTMCNC: 29.7 PG — SIGNIFICANT CHANGE UP (ref 27–31)
MCHC RBC-ENTMCNC: 32.8 G/DL — SIGNIFICANT CHANGE UP (ref 32–37)
MCV RBC AUTO: 90.4 FL — SIGNIFICANT CHANGE UP (ref 80–94)
MONOCYTES # BLD AUTO: 0.72 K/UL — HIGH (ref 0.1–0.6)
MONOCYTES NFR BLD AUTO: 6.9 % — SIGNIFICANT CHANGE UP (ref 1.7–9.3)
NEUTROPHILS # BLD AUTO: 8.39 K/UL — HIGH (ref 1.4–6.5)
NEUTROPHILS NFR BLD AUTO: 79.7 % — HIGH (ref 42.2–75.2)
NRBC # BLD: 0 /100 WBCS — SIGNIFICANT CHANGE UP (ref 0–0)
PLATELET # BLD AUTO: 273 K/UL — SIGNIFICANT CHANGE UP (ref 130–400)
PMV BLD: 10.7 FL — HIGH (ref 7.4–10.4)
POTASSIUM SERPL-MCNC: 4.6 MMOL/L — SIGNIFICANT CHANGE UP (ref 3.5–5)
POTASSIUM SERPL-SCNC: 4.6 MMOL/L — SIGNIFICANT CHANGE UP (ref 3.5–5)
PROT SERPL-MCNC: 7 G/DL — SIGNIFICANT CHANGE UP (ref 6–8)
PROTHROM AB SERPL-ACNC: 12.2 SEC — SIGNIFICANT CHANGE UP (ref 9.95–12.87)
RBC # BLD: 4.18 M/UL — LOW (ref 4.7–6.1)
RBC # FLD: 12.9 % — SIGNIFICANT CHANGE UP (ref 11.5–14.5)
SODIUM SERPL-SCNC: 140 MMOL/L — SIGNIFICANT CHANGE UP (ref 135–146)
TROPONIN T SERPL-MCNC: <0.01 NG/ML — SIGNIFICANT CHANGE UP
WBC # BLD: 10.51 K/UL — SIGNIFICANT CHANGE UP (ref 4.8–10.8)
WBC # FLD AUTO: 10.51 K/UL — SIGNIFICANT CHANGE UP (ref 4.8–10.8)

## 2023-06-21 PROCEDURE — 82977 ASSAY OF GGT: CPT

## 2023-06-21 PROCEDURE — 86901 BLOOD TYPING SEROLOGIC RH(D): CPT

## 2023-06-21 PROCEDURE — 82962 GLUCOSE BLOOD TEST: CPT

## 2023-06-21 PROCEDURE — 86850 RBC ANTIBODY SCREEN: CPT

## 2023-06-21 PROCEDURE — 73630 X-RAY EXAM OF FOOT: CPT | Mod: 26,RT

## 2023-06-21 PROCEDURE — 87186 SC STD MICRODIL/AGAR DIL: CPT

## 2023-06-21 PROCEDURE — 87206 SMEAR FLUORESCENT/ACID STAI: CPT

## 2023-06-21 PROCEDURE — 85027 COMPLETE CBC AUTOMATED: CPT

## 2023-06-21 PROCEDURE — 87070 CULTURE OTHR SPECIMN AEROBIC: CPT

## 2023-06-21 PROCEDURE — 88304 TISSUE EXAM BY PATHOLOGIST: CPT

## 2023-06-21 PROCEDURE — 87077 CULTURE AEROBIC IDENTIFY: CPT

## 2023-06-21 PROCEDURE — 86900 BLOOD TYPING SEROLOGIC ABO: CPT

## 2023-06-21 PROCEDURE — 93970 EXTREMITY STUDY: CPT

## 2023-06-21 PROCEDURE — 87102 FUNGUS ISOLATION CULTURE: CPT

## 2023-06-21 PROCEDURE — 87205 SMEAR GRAM STAIN: CPT

## 2023-06-21 PROCEDURE — 87116 MYCOBACTERIA CULTURE: CPT

## 2023-06-21 PROCEDURE — 93010 ELECTROCARDIOGRAM REPORT: CPT

## 2023-06-21 PROCEDURE — 83735 ASSAY OF MAGNESIUM: CPT

## 2023-06-21 PROCEDURE — 94640 AIRWAY INHALATION TREATMENT: CPT

## 2023-06-21 PROCEDURE — 74177 CT ABD & PELVIS W/CONTRAST: CPT

## 2023-06-21 PROCEDURE — 85730 THROMBOPLASTIN TIME PARTIAL: CPT

## 2023-06-21 PROCEDURE — 76705 ECHO EXAM OF ABDOMEN: CPT

## 2023-06-21 PROCEDURE — 99285 EMERGENCY DEPT VISIT HI MDM: CPT | Mod: FS

## 2023-06-21 PROCEDURE — 85025 COMPLETE CBC W/AUTO DIFF WBC: CPT

## 2023-06-21 PROCEDURE — 87040 BLOOD CULTURE FOR BACTERIA: CPT

## 2023-06-21 PROCEDURE — 86140 C-REACTIVE PROTEIN: CPT

## 2023-06-21 PROCEDURE — 36415 COLL VENOUS BLD VENIPUNCTURE: CPT

## 2023-06-21 PROCEDURE — 87075 CULTR BACTERIA EXCEPT BLOOD: CPT

## 2023-06-21 PROCEDURE — 85610 PROTHROMBIN TIME: CPT

## 2023-06-21 PROCEDURE — 85652 RBC SED RATE AUTOMATED: CPT

## 2023-06-21 PROCEDURE — 88311 DECALCIFY TISSUE: CPT

## 2023-06-21 PROCEDURE — 93306 TTE W/DOPPLER COMPLETE: CPT

## 2023-06-21 PROCEDURE — 80048 BASIC METABOLIC PNL TOTAL CA: CPT

## 2023-06-21 PROCEDURE — 99213 OFFICE O/P EST LOW 20 MIN: CPT | Mod: 24

## 2023-06-21 PROCEDURE — 82306 VITAMIN D 25 HYDROXY: CPT

## 2023-06-21 PROCEDURE — 80053 COMPREHEN METABOLIC PANEL: CPT

## 2023-06-21 RX ORDER — CEFEPIME 1 G/1
2000 INJECTION, POWDER, FOR SOLUTION INTRAMUSCULAR; INTRAVENOUS ONCE
Refills: 0 | Status: COMPLETED | OUTPATIENT
Start: 2023-06-21 | End: 2023-06-21

## 2023-06-21 RX ORDER — VANCOMYCIN HCL 1 G
1000 VIAL (EA) INTRAVENOUS ONCE
Refills: 0 | Status: COMPLETED | OUTPATIENT
Start: 2023-06-21 | End: 2023-06-21

## 2023-06-21 RX ORDER — METRONIDAZOLE 500 MG
500 TABLET ORAL ONCE
Refills: 0 | Status: COMPLETED | OUTPATIENT
Start: 2023-06-21 | End: 2023-06-21

## 2023-06-21 RX ADMIN — Medication 250 MILLIGRAM(S): at 16:15

## 2023-06-21 RX ADMIN — Medication 100 MILLIGRAM(S): at 16:14

## 2023-06-21 RX ADMIN — CEFEPIME 100 MILLIGRAM(S): 1 INJECTION, POWDER, FOR SOLUTION INTRAMUSCULAR; INTRAVENOUS at 16:15

## 2023-06-21 NOTE — PHYSICAL EXAM
[General Appearance - Alert] : alert [General Appearance - In No Acute Distress] : in no acute distress [Ankle Swelling On The Right] : of the right ankle [Varicose Veins Of The Right Leg] : on the right foot/ankle [1+] : right foot dorsalis pedis 1+ [0] : left foot dorsalis pedis 0 [Ankle Swelling (On Exam)] : not present [Varicose Veins Of Lower Extremities] : not present [] : not present [FreeTextEntry1] : plantar ulcer no probe to bone.  with periwound maceration and serous drainage \par dorsal surgical wound. with hypergranular tissue \par increased skin temperature to right foot

## 2023-06-21 NOTE — ED PROVIDER NOTE - OBJECTIVE STATEMENT
74 yo M with PMH of diabetes, hyperlipidemia, hypertension, bipolar disorder, GERD, osteomyelitis with toe amputations presents to the ER sent in by his podiatrist due to non healing ulceration to right foot. Pt was seen in this ER for similar sx 6/17, followed up with his podiatrist Dr Bhatia today who referred pt back to ER. Aslo endorses "gas"- like sensation to chest which has been present for a few days, though denies pain, shortness of breath, cough. Denies fever, chills, CP, SOB or other complaints.

## 2023-06-21 NOTE — ED PROVIDER NOTE - ATTENDING APP SHARED VISIT CONTRIBUTION OF CARE
73-year-old man with a history of diabetes, hyperlipidemia, hypertension, bipolar disorder, GERD, osteomyelitis in the past,  pt presents for nonhealing DFU to R foot. ulcer near 1st toe dorsal and pedal. pt saw pod today and was sent for admission for failure of OP abx.  + redness to foot. no fevers/chills. pt also endorsing indigestion/gas which he feels occasionally. no cp.

## 2023-06-21 NOTE — ED PROVIDER NOTE - PHYSICAL EXAMINATION
VITAL SIGNS: I have reviewed nursing notes and confirm.  CONSTITUTIONAL: Patient is in no acute distress.  SKIN: Skin exam is warm and dry, no acute rash.  HEAD: Normocephalic; atraumatic.  EYES: PERRL, EOM intact; conjunctiva and sclera clear.  ENT: No nasal discharge; airway clear.   NECK: Supple; non tender.  CARD: S1, S2 normal; Regular rate and rhythm.  RESP: Clear to auscultation bilaterally. No wheezes, rales or rhonchi.  ABD: Normal bowel sounds; soft; non-distended; non-tender.   EXT: Normal ROM. No edema. +Right foot dorsal and plantar ulceration near medial aspect with malodor.  NEURO: Alert, oriented. Grossly unremarkable. No focal deficits.  PSYCH: Cooperative, appropriate.

## 2023-06-21 NOTE — ED ADULT TRIAGE NOTE - CHIEF COMPLAINT QUOTE
Patient a+ox3 reports was sent in by PMD for eval of chronic right foot cellulitis wound with drainage- pt had recent debridement X 1 month ago

## 2023-06-21 NOTE — CONSULT NOTE ADULT - SUBJECTIVE AND OBJECTIVE BOX
Podiatry Consult Note    Subjective:  JOSÉ MANUEL OPRTER  Seen Bedside 73y Male  .   Patient is a 73y old  Male who presents with a chief complaint of   HPI:      Past Medical History and Surgical History  PAST MEDICAL & SURGICAL HISTORY:  DM (diabetes mellitus)  Type 2, 12/27/18 hgb aic  11.2      HTN (hypertension)      Dyslipidemia      Diabetic foot ulcer      Depression      GERD (gastroesophageal reflux disease)      Neuropathy, diabetic      Toe amputation status, right  (2008)      History of amputation of toe  LT -partial 1st toe           Review of Systems:  [X] Ten point review of systems is otherwise negative except as noted     Objective:  Vital Signs Last 24 Hrs  T(C): 36.7 (21 Jun 2023 12:23), Max: 36.7 (21 Jun 2023 12:23)  T(F): 98 (21 Jun 2023 12:23), Max: 98 (21 Jun 2023 12:23)  HR: 97 (21 Jun 2023 12:23) (97 - 97)  BP: 199/102 (21 Jun 2023 12:23) (199/102 - 199/102)  BP(mean): --  RR: 18 (21 Jun 2023 12:23) (18 - 18)  SpO2: 99% (21 Jun 2023 12:23) (99% - 99%)    Parameters below as of 21 Jun 2023 12:23  Patient On (Oxygen Delivery Method): room air                          Physical Exam - Right Lower Extremity Focused:   Derm: Plantar right foot ulcer with granular tissue present, deep to bone, mild macerated borders, mild malodor noted, Mild serosanguinous drainage. with periwound erythema.   Vascular: DP and PT Pulses Diminished; Foot is Warm to Warm to the touch; Capillary Refill Time < 3 Seconds;    Neuro: Protective Sensation Diminished / Moderately Neuropathic   MSK: minimal Pain On Palpation at Wound Site     Assessment:  Right DFU;     Plan:  Chart reviewed and Patient evaluated. All Questions and Concerns Addressed and Answered  XR Imaging R Foot; Pending Results  Local Wound Care; Wound Flushed w/ NS; Wound Packed w/ iodoform / DSD / Kerlix / ACE  Continue with local wound care q24h;  Weight Bearing Status; WBAT   Recommend;  ESR/CRP;   Request Medical clearance;   Plan for surgery Friday 6/23/23 @ 2pm; Excisional debridement of soft tissue w/ wound wash out right foot;   Please make patient NPO at midnight 6/22/23  T&S and COAG studies prior to OR:  Discussed Plan w/ Dr. Bhatia    Podiatry  Podiatry Consult Note    Subjective:  JOSÉ MANUEL PORTER  Seen Bedside 73y Male  .   Patient is a 73y old  Male who presents with a chief complaint of   HPI:      Past Medical History and Surgical History  PAST MEDICAL & SURGICAL HISTORY:  DM (diabetes mellitus)  Type 2, 12/27/18 hgb aic  11.2      HTN (hypertension)      Dyslipidemia      Diabetic foot ulcer      Depression      GERD (gastroesophageal reflux disease)      Neuropathy, diabetic      Toe amputation status, right  (2008)      History of amputation of toe  LT -partial 1st toe           Review of Systems:  [X] Ten point review of systems is otherwise negative except as noted     Objective:  Vital Signs Last 24 Hrs  T(C): 36.7 (21 Jun 2023 12:23), Max: 36.7 (21 Jun 2023 12:23)  T(F): 98 (21 Jun 2023 12:23), Max: 98 (21 Jun 2023 12:23)  HR: 97 (21 Jun 2023 12:23) (97 - 97)  BP: 199/102 (21 Jun 2023 12:23) (199/102 - 199/102)  BP(mean): --  RR: 18 (21 Jun 2023 12:23) (18 - 18)  SpO2: 99% (21 Jun 2023 12:23) (99% - 99%)    Parameters below as of 21 Jun 2023 12:23  Patient On (Oxygen Delivery Method): room air                          Physical Exam - Right Lower Extremity Focused:   Derm: Plantar right foot ulcer with granular tissue present, deep to bone, mild macerated borders, mild malodor noted, Mild serosanguinous drainage. with periwound erythema.   Vascular: DP and PT Pulses Diminished; Foot is Warm to Warm to the touch; Capillary Refill Time < 3 Seconds;    Neuro: Protective Sensation Diminished / Moderately Neuropathic   MSK: minimal Pain On Palpation at Wound Site     Assessment:  Right DFU; probes to bone  Cellulitic Right Lower Extremity     Plan:  Chart reviewed and Patient evaluated. All Questions and Concerns Addressed and Answered  XR Imaging R Foot; Pending Results  Local Wound Care; Wound Flushed w/ NS; Wound Packed w/ iodoform / DSD / Kerlix / ACE  Continue with local wound care q24h;  Weight Bearing Status; WBAT   Recommend;  ESR/CRP;   Request Medical clearance;   Plan for surgery Friday 6/23/23 @ 2pm; Excisional debridement of soft tissue w/ wound wash out right foot;   Please make patient NPO at midnight 6/22/23  T&S and COAG studies prior to OR:  Discussed Plan w/ Dr. Bhatia    Podiatry  Podiatry Consult Note    Subjective:  JOSÉ MANUEL PORTER  Seen Bedside 73y Male  .   Patient is a 73y old  Male who presents with a chief complaint of   HPI:      Past Medical History and Surgical History  PAST MEDICAL & SURGICAL HISTORY:  DM (diabetes mellitus)  Type 2, 12/27/18 hgb aic  11.2      HTN (hypertension)      Dyslipidemia      Diabetic foot ulcer      Depression      GERD (gastroesophageal reflux disease)      Neuropathy, diabetic      Toe amputation status, right  (2008)      History of amputation of toe  LT -partial 1st toe           Review of Systems:  [X] Ten point review of systems is otherwise negative except as noted     Objective:  Vital Signs Last 24 Hrs  T(C): 36.7 (21 Jun 2023 12:23), Max: 36.7 (21 Jun 2023 12:23)  T(F): 98 (21 Jun 2023 12:23), Max: 98 (21 Jun 2023 12:23)  HR: 97 (21 Jun 2023 12:23) (97 - 97)  BP: 199/102 (21 Jun 2023 12:23) (199/102 - 199/102)  BP(mean): --  RR: 18 (21 Jun 2023 12:23) (18 - 18)  SpO2: 99% (21 Jun 2023 12:23) (99% - 99%)    Parameters below as of 21 Jun 2023 12:23  Patient On (Oxygen Delivery Method): room air                          Physical Exam - Right Lower Extremity Focused:   Derm: Plantar right foot ulcer with granular tissue present, deep to bone, mild macerated borders, mild malodor noted, Moderate purulent drainage noted; with periwound erythema.   Vascular: DP and PT Pulses Diminished; Foot is Warm to Warm to the touch; Capillary Refill Time < 3 Seconds;    Neuro: Protective Sensation Diminished / Moderately Neuropathic   MSK: minimal Pain On Palpation at Wound Site     Assessment:  Right DFU; probes to bone  Cellulitic Right Lower Extremity     Plan:  Chart reviewed and Patient evaluated. All Questions and Concerns Addressed and Answered  XR Imaging R Foot; Pending Results  Wound Culture Obtained; Sent to Microbiology; Pending Results   Local Wound Care; Wound Flushed w/ NS; Wound Packed w/ iodoform / DSD / Kerlix / ACE  Continue with local wound care q24h;  Weight Bearing Status; WBAT   Recommend;  ESR/CRP;   Request Medical clearance;   Plan for surgery Friday 6/23/23 @ 2pm; Excisional debridement of soft tissue w/ wound wash out right foot;   Please make patient NPO at midnight 6/22/23  T&S and COAG studies prior to OR:  Discussed Plan w/ Dr. Bhatia    Podiatry  Podiatry Consult Note    Subjective:  JOS ÉMANUEL PORTER  Seen Bedside 73y Male  .   Patient is a 73y old  Male who presents with a chief complaint of   HPI:      Past Medical History and Surgical History  PAST MEDICAL & SURGICAL HISTORY:  DM (diabetes mellitus)  Type 2, 12/27/18 hgb aic  11.2      HTN (hypertension)      Dyslipidemia      Diabetic foot ulcer      Depression      GERD (gastroesophageal reflux disease)      Neuropathy, diabetic      Toe amputation status, right  (2008)      History of amputation of toe  LT -partial 1st toe           Review of Systems:  [X] Ten point review of systems is otherwise negative except as noted     Objective:  Vital Signs Last 24 Hrs  T(C): 36.7 (21 Jun 2023 12:23), Max: 36.7 (21 Jun 2023 12:23)  T(F): 98 (21 Jun 2023 12:23), Max: 98 (21 Jun 2023 12:23)  HR: 97 (21 Jun 2023 12:23) (97 - 97)  BP: 199/102 (21 Jun 2023 12:23) (199/102 - 199/102)  BP(mean): --  RR: 18 (21 Jun 2023 12:23) (18 - 18)  SpO2: 99% (21 Jun 2023 12:23) (99% - 99%)    Parameters below as of 21 Jun 2023 12:23  Patient On (Oxygen Delivery Method): room air                          Physical Exam - Right Lower Extremity Focused:   Derm: Plantar right foot ulcer with granular tissue present, deep to bone, mild macerated borders, mild malodor noted, Moderate purulent drainage noted; with periwound erythema.   Vascular: DP and PT Pulses Diminished; Foot is Warm to Warm to the touch; Capillary Refill Time < 3 Seconds;    Neuro: Protective Sensation Diminished / Moderately Neuropathic   MSK: minimal Pain On Palpation at Wound Site     Assessment:  Right DFU; probes to bone  Cellulitic Right Lower Extremity     Plan:  Chart reviewed and Patient evaluated. All Questions and Concerns Addressed and Answered  XR Imaging R Foot; Pending Results  Wound Culture Obtained; Sent to Microbiology; Pending Results   Request ID consult; f/u with wound culture  Please continue with IV abx per ID recc;  Local Wound Care; Wound Flushed w/ NS; Wound Packed w/ iodoform / DSD / Kerlix / ACE  Continue with local wound care q24h;  Weight Bearing Status; WBAT   Recommend;  ESR/CRP;   Request Medical clearance;   Plan for surgery Friday 6/23/23 @ 2pm; Excisional debridement of soft tissue w/ wound wash out right foot;   Please make patient NPO at midnight 6/22/23  T&S and COAG studies prior to OR:  Discussed Plan w/ Dr. Bhatia    Podiatry

## 2023-06-21 NOTE — ED ADULT NURSE NOTE - NSFALLRISKINTERV_ED_ALL_ED

## 2023-06-21 NOTE — HISTORY OF PRESENT ILLNESS
[FreeTextEntry1] : 72 y/o diabetic male s/p right foot ulcer debridement w/ removal of staples 5/18/2023. Here today for follow up wound care\par \par 6/21 presents today with increased drainage to the right foot. Patient notes increased drainage since saturday

## 2023-06-21 NOTE — ED ADULT NURSE NOTE - PAIN: PRESENCE, MLM
Wife requesting refill of methocarbamol 750mg tid  Also they need a letter stating why pt is taking this med    If agreeable please fax to [phone] [facility]  Attn: Arleen Pool RN, [address]     Phone w/any questions Danilo [place] [address] [facility] denies pain/discomfort (Rating = 0)

## 2023-06-21 NOTE — ED PROVIDER NOTE - CLINICAL SUMMARY MEDICAL DECISION MAKING FREE TEXT BOX
ccruz - pt signed out to me by Dr Hurd. presents for admission for infected DFU. labs sent. seen by podiatry.

## 2023-06-21 NOTE — ED PROVIDER NOTE - NS ED ROS FT
Review of Systems:  	•	CONSTITUTIONAL - no fever, no diaphoresis, no chills  	•	SKIN - no rash, +R foot wound  	•	HEMATOLOGIC - no bleeding, no bruising  	•	EYES - no eye pain, no blurry vision  	•	ENT - no congestion  	•	RESPIRATORY - no shortness of breath, no cough  	•	CARDIAC - no chest pain, no palpitations  	•	GI - no abd pain, no nausea, no vomiting, no diarrhea, no constipation  	•	GENITO-URINARY - no dysuria; no hematuria, no increased urinary frequency  	•	MUSCULOSKELETAL - no joint paint, no swelling, no redness  	•	NEUROLOGIC - no weakness, no headache, no paresthesias, no LOC  	•	PSYCH - no anxiety, no depression  	All other ROS are negative except as documented in HPI.
Family

## 2023-06-21 NOTE — ASSESSMENT
[FreeTextEntry1] : discussed admission vs a trail of p.o antibiotics. \par right foot wound cultures taken\par xrays reviewed\par rx augmentin\par at the end of visit, patient ultimately decided to go the ED for admission, will schedule for debridement and washout once admitted

## 2023-06-22 ENCOUNTER — TRANSCRIPTION ENCOUNTER (OUTPATIENT)
Age: 73
End: 2023-06-22

## 2023-06-22 LAB
-  STREPTOCOCCUS SP. (NOT GRP A, B OR S PNEUMONIAE): SIGNIFICANT CHANGE UP
ALBUMIN SERPL ELPH-MCNC: 3.5 G/DL — SIGNIFICANT CHANGE UP (ref 3.5–5.2)
ALP SERPL-CCNC: 186 U/L — HIGH (ref 30–115)
ALT FLD-CCNC: 88 U/L — HIGH (ref 0–41)
ANION GAP SERPL CALC-SCNC: 10 MMOL/L — SIGNIFICANT CHANGE UP (ref 7–14)
ANION GAP SERPL CALC-SCNC: 13 MMOL/L — SIGNIFICANT CHANGE UP (ref 7–14)
APTT BLD: 33.4 SEC — SIGNIFICANT CHANGE UP (ref 27–39.2)
AST SERPL-CCNC: 22 U/L — SIGNIFICANT CHANGE UP (ref 0–41)
BASOPHILS # BLD AUTO: 0.04 K/UL — SIGNIFICANT CHANGE UP (ref 0–0.2)
BASOPHILS NFR BLD AUTO: 0.4 % — SIGNIFICANT CHANGE UP (ref 0–1)
BILIRUB SERPL-MCNC: 0.6 MG/DL — SIGNIFICANT CHANGE UP (ref 0.2–1.2)
BUN SERPL-MCNC: 10 MG/DL — SIGNIFICANT CHANGE UP (ref 10–20)
BUN SERPL-MCNC: 14 MG/DL — SIGNIFICANT CHANGE UP (ref 10–20)
CALCIUM SERPL-MCNC: 8.4 MG/DL — SIGNIFICANT CHANGE UP (ref 8.4–10.5)
CALCIUM SERPL-MCNC: 8.9 MG/DL — SIGNIFICANT CHANGE UP (ref 8.4–10.5)
CHLORIDE SERPL-SCNC: 104 MMOL/L — SIGNIFICANT CHANGE UP (ref 98–110)
CHLORIDE SERPL-SCNC: 105 MMOL/L — SIGNIFICANT CHANGE UP (ref 98–110)
CO2 SERPL-SCNC: 22 MMOL/L — SIGNIFICANT CHANGE UP (ref 17–32)
CO2 SERPL-SCNC: 25 MMOL/L — SIGNIFICANT CHANGE UP (ref 17–32)
CREAT SERPL-MCNC: 0.9 MG/DL — SIGNIFICANT CHANGE UP (ref 0.7–1.5)
CREAT SERPL-MCNC: 1 MG/DL — SIGNIFICANT CHANGE UP (ref 0.7–1.5)
CRP SERPL-MCNC: 77.3 MG/L — HIGH
EGFR: 78 ML/MIN/1.73M2 — SIGNIFICANT CHANGE UP
EGFR: 89 ML/MIN/1.73M2 — SIGNIFICANT CHANGE UP
EOSINOPHIL # BLD AUTO: 0.1 K/UL — SIGNIFICANT CHANGE UP (ref 0–0.7)
EOSINOPHIL NFR BLD AUTO: 1 % — SIGNIFICANT CHANGE UP (ref 0–8)
GGT SERPL-CCNC: 136 U/L — HIGH (ref 1–40)
GLUCOSE BLDC GLUCOMTR-MCNC: 130 MG/DL — HIGH (ref 70–99)
GLUCOSE BLDC GLUCOMTR-MCNC: 136 MG/DL — HIGH (ref 70–99)
GLUCOSE BLDC GLUCOMTR-MCNC: 80 MG/DL — SIGNIFICANT CHANGE UP (ref 70–99)
GLUCOSE BLDC GLUCOMTR-MCNC: 86 MG/DL — SIGNIFICANT CHANGE UP (ref 70–99)
GLUCOSE SERPL-MCNC: 142 MG/DL — HIGH (ref 70–99)
GLUCOSE SERPL-MCNC: 86 MG/DL — SIGNIFICANT CHANGE UP (ref 70–99)
GRAM STN FLD: SIGNIFICANT CHANGE UP
HCT VFR BLD CALC: 33.6 % — LOW (ref 42–52)
HCT VFR BLD CALC: 36.1 % — LOW (ref 42–52)
HGB BLD-MCNC: 11.1 G/DL — LOW (ref 14–18)
HGB BLD-MCNC: 11.8 G/DL — LOW (ref 14–18)
IMM GRANULOCYTES NFR BLD AUTO: 2.3 % — HIGH (ref 0.1–0.3)
INR BLD: 1.14 RATIO — SIGNIFICANT CHANGE UP (ref 0.65–1.3)
LYMPHOCYTES # BLD AUTO: 0.95 K/UL — LOW (ref 1.2–3.4)
LYMPHOCYTES # BLD AUTO: 9.5 % — LOW (ref 20.5–51.1)
MAGNESIUM SERPL-MCNC: 1.7 MG/DL — LOW (ref 1.8–2.4)
MAGNESIUM SERPL-MCNC: 2.2 MG/DL — SIGNIFICANT CHANGE UP (ref 1.8–2.4)
MCHC RBC-ENTMCNC: 29.1 PG — SIGNIFICANT CHANGE UP (ref 27–31)
MCHC RBC-ENTMCNC: 30.1 PG — SIGNIFICANT CHANGE UP (ref 27–31)
MCHC RBC-ENTMCNC: 32.7 G/DL — SIGNIFICANT CHANGE UP (ref 32–37)
MCHC RBC-ENTMCNC: 33 G/DL — SIGNIFICANT CHANGE UP (ref 32–37)
MCV RBC AUTO: 88.9 FL — SIGNIFICANT CHANGE UP (ref 80–94)
MCV RBC AUTO: 91.1 FL — SIGNIFICANT CHANGE UP (ref 80–94)
METHOD TYPE: SIGNIFICANT CHANGE UP
MONOCYTES # BLD AUTO: 0.88 K/UL — HIGH (ref 0.1–0.6)
MONOCYTES NFR BLD AUTO: 8.8 % — SIGNIFICANT CHANGE UP (ref 1.7–9.3)
MSSA DNA SPEC QL NAA+PROBE: SIGNIFICANT CHANGE UP
NEUTROPHILS # BLD AUTO: 7.84 K/UL — HIGH (ref 1.4–6.5)
NEUTROPHILS NFR BLD AUTO: 78 % — HIGH (ref 42.2–75.2)
NRBC # BLD: 0 /100 WBCS — SIGNIFICANT CHANGE UP (ref 0–0)
NRBC # BLD: 0 /100 WBCS — SIGNIFICANT CHANGE UP (ref 0–0)
PLATELET # BLD AUTO: 260 K/UL — SIGNIFICANT CHANGE UP (ref 130–400)
PLATELET # BLD AUTO: 276 K/UL — SIGNIFICANT CHANGE UP (ref 130–400)
PMV BLD: 10.6 FL — HIGH (ref 7.4–10.4)
PMV BLD: 11 FL — HIGH (ref 7.4–10.4)
POTASSIUM SERPL-MCNC: 4.1 MMOL/L — SIGNIFICANT CHANGE UP (ref 3.5–5)
POTASSIUM SERPL-MCNC: 4.8 MMOL/L — SIGNIFICANT CHANGE UP (ref 3.5–5)
POTASSIUM SERPL-SCNC: 4.1 MMOL/L — SIGNIFICANT CHANGE UP (ref 3.5–5)
POTASSIUM SERPL-SCNC: 4.8 MMOL/L — SIGNIFICANT CHANGE UP (ref 3.5–5)
PROT SERPL-MCNC: 6.1 G/DL — SIGNIFICANT CHANGE UP (ref 6–8)
PROTHROM AB SERPL-ACNC: 13.1 SEC — HIGH (ref 9.95–12.87)
RBC # BLD: 3.69 M/UL — LOW (ref 4.7–6.1)
RBC # BLD: 4.06 M/UL — LOW (ref 4.7–6.1)
RBC # FLD: 12.9 % — SIGNIFICANT CHANGE UP (ref 11.5–14.5)
RBC # FLD: 13.2 % — SIGNIFICANT CHANGE UP (ref 11.5–14.5)
SODIUM SERPL-SCNC: 139 MMOL/L — SIGNIFICANT CHANGE UP (ref 135–146)
SODIUM SERPL-SCNC: 140 MMOL/L — SIGNIFICANT CHANGE UP (ref 135–146)
SPECIMEN SOURCE: SIGNIFICANT CHANGE UP
WBC # BLD: 10.04 K/UL — SIGNIFICANT CHANGE UP (ref 4.8–10.8)
WBC # BLD: 8.15 K/UL — SIGNIFICANT CHANGE UP (ref 4.8–10.8)
WBC # FLD AUTO: 10.04 K/UL — SIGNIFICANT CHANGE UP (ref 4.8–10.8)
WBC # FLD AUTO: 8.15 K/UL — SIGNIFICANT CHANGE UP (ref 4.8–10.8)

## 2023-06-22 PROCEDURE — 99223 1ST HOSP IP/OBS HIGH 75: CPT

## 2023-06-22 RX ORDER — PREDNISOLONE SODIUM PHOSPHATE 1 %
1 DROPS OPHTHALMIC (EYE) EVERY 6 HOURS
Refills: 0 | Status: DISCONTINUED | OUTPATIENT
Start: 2023-06-22 | End: 2023-06-23

## 2023-06-22 RX ORDER — DEXTROSE 50 % IN WATER 50 %
25 SYRINGE (ML) INTRAVENOUS ONCE
Refills: 0 | Status: DISCONTINUED | OUTPATIENT
Start: 2023-06-22 | End: 2023-06-23

## 2023-06-22 RX ORDER — CEFEPIME 1 G/1
INJECTION, POWDER, FOR SOLUTION INTRAMUSCULAR; INTRAVENOUS
Refills: 0 | Status: DISCONTINUED | OUTPATIENT
Start: 2023-06-22 | End: 2023-06-22

## 2023-06-22 RX ORDER — DEXTROSE 50 % IN WATER 50 %
15 SYRINGE (ML) INTRAVENOUS ONCE
Refills: 0 | Status: DISCONTINUED | OUTPATIENT
Start: 2023-06-22 | End: 2023-06-23

## 2023-06-22 RX ORDER — SODIUM CHLORIDE 9 MG/ML
1000 INJECTION, SOLUTION INTRAVENOUS
Refills: 0 | Status: DISCONTINUED | OUTPATIENT
Start: 2023-06-22 | End: 2023-06-23

## 2023-06-22 RX ORDER — INSULIN GLARGINE 100 [IU]/ML
10 INJECTION, SOLUTION SUBCUTANEOUS AT BEDTIME
Refills: 0 | Status: DISCONTINUED | OUTPATIENT
Start: 2023-06-22 | End: 2023-06-23

## 2023-06-22 RX ORDER — ALBUTEROL 90 UG/1
2 AEROSOL, METERED ORAL EVERY 6 HOURS
Refills: 0 | Status: DISCONTINUED | OUTPATIENT
Start: 2023-06-22 | End: 2023-06-23

## 2023-06-22 RX ORDER — INSULIN LISPRO 100/ML
3 VIAL (ML) SUBCUTANEOUS
Refills: 0 | Status: DISCONTINUED | OUTPATIENT
Start: 2023-06-22 | End: 2023-06-23

## 2023-06-22 RX ORDER — ASPIRIN/CALCIUM CARB/MAGNESIUM 324 MG
81 TABLET ORAL DAILY
Refills: 0 | Status: DISCONTINUED | OUTPATIENT
Start: 2023-06-22 | End: 2023-06-23

## 2023-06-22 RX ORDER — GLUCAGON INJECTION, SOLUTION 0.5 MG/.1ML
1 INJECTION, SOLUTION SUBCUTANEOUS ONCE
Refills: 0 | Status: DISCONTINUED | OUTPATIENT
Start: 2023-06-22 | End: 2023-06-23

## 2023-06-22 RX ORDER — MAGNESIUM SULFATE 500 MG/ML
2 VIAL (ML) INJECTION ONCE
Refills: 0 | Status: COMPLETED | OUTPATIENT
Start: 2023-06-22 | End: 2023-06-22

## 2023-06-22 RX ORDER — CEFEPIME 1 G/1
2000 INJECTION, POWDER, FOR SOLUTION INTRAMUSCULAR; INTRAVENOUS EVERY 8 HOURS
Refills: 0 | Status: DISCONTINUED | OUTPATIENT
Start: 2023-06-22 | End: 2023-06-22

## 2023-06-22 RX ORDER — CEFTRIAXONE 500 MG/1
2000 INJECTION, POWDER, FOR SOLUTION INTRAMUSCULAR; INTRAVENOUS EVERY 12 HOURS
Refills: 0 | Status: DISCONTINUED | OUTPATIENT
Start: 2023-06-22 | End: 2023-06-23

## 2023-06-22 RX ORDER — LACOSAMIDE 50 MG/1
100 TABLET ORAL
Refills: 0 | Status: DISCONTINUED | OUTPATIENT
Start: 2023-06-22 | End: 2023-06-23

## 2023-06-22 RX ORDER — INSULIN LISPRO 100/ML
VIAL (ML) SUBCUTANEOUS
Refills: 0 | Status: DISCONTINUED | OUTPATIENT
Start: 2023-06-22 | End: 2023-06-23

## 2023-06-22 RX ORDER — METRONIDAZOLE 500 MG
500 TABLET ORAL ONCE
Refills: 0 | Status: DISCONTINUED | OUTPATIENT
Start: 2023-06-22 | End: 2023-06-23

## 2023-06-22 RX ORDER — DEXTROSE 50 % IN WATER 50 %
12.5 SYRINGE (ML) INTRAVENOUS ONCE
Refills: 0 | Status: DISCONTINUED | OUTPATIENT
Start: 2023-06-22 | End: 2023-06-23

## 2023-06-22 RX ORDER — PANTOPRAZOLE SODIUM 20 MG/1
40 TABLET, DELAYED RELEASE ORAL
Refills: 0 | Status: DISCONTINUED | OUTPATIENT
Start: 2023-06-22 | End: 2023-06-23

## 2023-06-22 RX ORDER — ENOXAPARIN SODIUM 100 MG/ML
40 INJECTION SUBCUTANEOUS EVERY 24 HOURS
Refills: 0 | Status: DISCONTINUED | OUTPATIENT
Start: 2023-06-22 | End: 2023-06-23

## 2023-06-22 RX ORDER — LEVETIRACETAM 250 MG/1
750 TABLET, FILM COATED ORAL
Refills: 0 | Status: DISCONTINUED | OUTPATIENT
Start: 2023-06-22 | End: 2023-06-23

## 2023-06-22 RX ORDER — METRONIDAZOLE 500 MG
500 TABLET ORAL EVERY 8 HOURS
Refills: 0 | Status: DISCONTINUED | OUTPATIENT
Start: 2023-06-22 | End: 2023-06-23

## 2023-06-22 RX ORDER — METRONIDAZOLE 500 MG
TABLET ORAL
Refills: 0 | Status: DISCONTINUED | OUTPATIENT
Start: 2023-06-22 | End: 2023-06-23

## 2023-06-22 RX ORDER — VANCOMYCIN HCL 1 G
1250 VIAL (EA) INTRAVENOUS EVERY 12 HOURS
Refills: 0 | Status: DISCONTINUED | OUTPATIENT
Start: 2023-06-22 | End: 2023-06-22

## 2023-06-22 RX ORDER — CEFEPIME 1 G/1
2000 INJECTION, POWDER, FOR SOLUTION INTRAMUSCULAR; INTRAVENOUS ONCE
Refills: 0 | Status: COMPLETED | OUTPATIENT
Start: 2023-06-22 | End: 2023-06-22

## 2023-06-22 RX ORDER — BUDESONIDE AND FORMOTEROL FUMARATE DIHYDRATE 160; 4.5 UG/1; UG/1
2 AEROSOL RESPIRATORY (INHALATION)
Refills: 0 | Status: DISCONTINUED | OUTPATIENT
Start: 2023-06-22 | End: 2023-06-23

## 2023-06-22 RX ADMIN — Medication 100 MILLIGRAM(S): at 05:13

## 2023-06-22 RX ADMIN — CEFEPIME 100 MILLIGRAM(S): 1 INJECTION, POWDER, FOR SOLUTION INTRAMUSCULAR; INTRAVENOUS at 05:15

## 2023-06-22 RX ADMIN — Medication 1 DROP(S): at 17:31

## 2023-06-22 RX ADMIN — PANTOPRAZOLE SODIUM 40 MILLIGRAM(S): 20 TABLET, DELAYED RELEASE ORAL at 05:12

## 2023-06-22 RX ADMIN — LACOSAMIDE 100 MILLIGRAM(S): 50 TABLET ORAL at 05:12

## 2023-06-22 RX ADMIN — Medication 100 MILLIGRAM(S): at 21:43

## 2023-06-22 RX ADMIN — Medication 1 DROP(S): at 11:33

## 2023-06-22 RX ADMIN — Medication 1 DROP(S): at 05:14

## 2023-06-22 RX ADMIN — CEFTRIAXONE 100 MILLIGRAM(S): 500 INJECTION, POWDER, FOR SOLUTION INTRAMUSCULAR; INTRAVENOUS at 17:30

## 2023-06-22 RX ADMIN — Medication 81 MILLIGRAM(S): at 11:31

## 2023-06-22 RX ADMIN — CEFEPIME 100 MILLIGRAM(S): 1 INJECTION, POWDER, FOR SOLUTION INTRAMUSCULAR; INTRAVENOUS at 01:54

## 2023-06-22 RX ADMIN — LACOSAMIDE 100 MILLIGRAM(S): 50 TABLET ORAL at 17:31

## 2023-06-22 RX ADMIN — Medication 1 DROP(S): at 21:43

## 2023-06-22 RX ADMIN — Medication 100 MILLIGRAM(S): at 13:41

## 2023-06-22 RX ADMIN — ENOXAPARIN SODIUM 40 MILLIGRAM(S): 100 INJECTION SUBCUTANEOUS at 05:12

## 2023-06-22 RX ADMIN — LEVETIRACETAM 750 MILLIGRAM(S): 250 TABLET, FILM COATED ORAL at 17:31

## 2023-06-22 RX ADMIN — Medication 25 GRAM(S): at 16:19

## 2023-06-22 RX ADMIN — INSULIN GLARGINE 10 UNIT(S): 100 INJECTION, SOLUTION SUBCUTANEOUS at 21:43

## 2023-06-22 RX ADMIN — LEVETIRACETAM 750 MILLIGRAM(S): 250 TABLET, FILM COATED ORAL at 05:13

## 2023-06-22 RX ADMIN — Medication 3 UNIT(S): at 17:32

## 2023-06-22 RX ADMIN — BUDESONIDE AND FORMOTEROL FUMARATE DIHYDRATE 2 PUFF(S): 160; 4.5 AEROSOL RESPIRATORY (INHALATION) at 11:34

## 2023-06-22 NOTE — H&P ADULT - HISTORY OF PRESENT ILLNESS
72 yo M w  PMH of DM  HLD  HTN  bipolar disorder  GERD  OM  with toe amputations was in by his podiatrist due to non healing ulceration to right foot.  Pt was recently admitted to [facility] 5/23 for debridement of the same ulcer.  Pt was seen in ER for similar sx 6/17, followed up with his podiatrist Dr Bhatia today who referred pt back to ER. As per podiatry's note the ulcer had no purulent discharge on 6/17, but today has malodorous purulent discharge. 72 yo M w  PMH of DM  HLD  HTN  bipolar disorder  GERD  OM  with toe amputations was in by his podiatrist due to non healing ulceration to right foot.  Pt was recently admitted to [facility] 5/23 for debridement of the same ulcer.  Pt was seen in ER for similar sx 6/17, followed up with his podiatrist Dr Bhatia today who referred pt back to ER. As per podiatry's note the ulcer had no purulent discharge on 6/17, but today has malodorous purulent discharge.  At my encounter pt denies pain at the site and denies fever / NV / diarrhoea / CP.    In the ED, /102  Labs: lipase 107  XR foot:    Possible 1.1 cm gas containing collection seen abutting the first metatarsal transection (new compared to XR in 5/23)   No new cortical destruction to suggest osteomyelitis  art duplex 5/23: wnl

## 2023-06-22 NOTE — H&P ADULT - ASSESSMENT
74 yo M w  PMH of DM  HLD  HTN  bipolar disorder  GERD  OM  with toe amputations was in by his podiatrist due to non healing ulceration to right foot.     DFU R foot  OM?  XR R foot :   Possible 1.1 cm gas containing collection seen abutting the first metatarsal transection (new compared to XR in 5/23)   No new cortical destruction to suggest osteomyelitis  art duplex 5/23: wnl  - vanc 1250 q12  +  cefepime 2 q8  +  flagyl 500 q8  - fu BCx    WCx  - fu ESR  CRP  - fu MR R foot  - podiatry: Excisional debridement of soft tissue w/ wound wash out right foot on 6/23  - fu ID consult    ++alk phos and ALP  possible from bone path (OM)  - fu GGT    DMT2  - Started on 10u Lantus and 3u Lispro premeal w/ ISS  - monitor FS, adjust PRN    Asthma  - not in exacerbation  - c/w inhalers    Seizure Disorder  - c/w home Keppra 750mg BID  - c/w home lacosamide 100mg BID    HO Bipolar Depression  - not on meds  - not in manic or depression state    GERD  - therapeutic exchange for home omeprazole    DVT ppx: lovenox  GI ppx: PPI  Diet: DASH/TLC/CC  Activity: AAT  Code Status: Full Code

## 2023-06-22 NOTE — PATIENT PROFILE ADULT - FALL HARM RISK - HARM RISK INTERVENTIONS

## 2023-06-22 NOTE — CONSULT NOTE ADULT - SUBJECTIVE AND OBJECTIVE BOX
JOSÉ MANUEL PORTER  73y, Male  Allergy: No Known Allergies      All historical available data reviewed.    HPI:  72 yo M w  PMH of DM  HLD  HTN  bipolar disorder  GERD  OM  with toe amputations was in by his podiatrist due to non healing ulceration to right foot.  Pt was recently admitted to [facility] 5/23 for debridement of the same ulcer.  Pt was seen in ER for similar sx 6/17, followed up with his podiatrist Dr Bhatia today who referred pt back to ER. As per podiatry's note the ulcer had no purulent discharge on 6/17, but today has malodorous purulent discharge.  At my encounter pt denies pain at the site and denies fever / NV / diarrhoea / CP.    In the ED, /102  Labs: lipase 107  XR foot:    Possible 1.1 cm gas containing collection seen abutting the first metatarsal transection (new compared to XR in 5/23)   No new cortical destruction to suggest osteomyelitis  art duplex 5/23: wnl     (22 Jun 2023 01:32)    FAMILY HISTORY:  Family history of lung cancer (Mother)    Family history of ischemic heart disease (IHD) (Father)      PAST MEDICAL & SURGICAL HISTORY:  DM (diabetes mellitus)  Type 2, 12/27/18 hgb aic  11.2      HTN (hypertension)      Dyslipidemia      Diabetic foot ulcer      Depression      GERD (gastroesophageal reflux disease)      Neuropathy, diabetic      Toe amputation status, right  (2008)      History of amputation of toe  LT -partial 1st toe            VITALS:  T(F): 97, Max: 100.2 (06-22-23 @ 00:18)  HR: 89  BP: 140/60  RR: 18Vital Signs Last 24 Hrs  T(C): 36.1 (22 Jun 2023 03:00), Max: 37.9 (22 Jun 2023 00:18)  T(F): 97 (22 Jun 2023 03:00), Max: 100.2 (22 Jun 2023 00:18)  HR: 89 (22 Jun 2023 03:00) (80 - 97)  BP: 140/60 (22 Jun 2023 03:00) (140/60 - 199/102)  BP(mean): --  RR: 18 (22 Jun 2023 03:00) (18 - 18)  SpO2: 98% (22 Jun 2023 03:00) (97% - 99%)    Parameters below as of 22 Jun 2023 03:00  Patient On (Oxygen Delivery Method): room air        TESTS & MEASUREMENTS:                        12.4   10.51 )-----------( 273      ( 21 Jun 2023 15:37 )             37.8     06-21    140  |  102  |  12  ----------------------------<  147<H>  4.6   |  26  |  0.9    Ca    9.5      21 Jun 2023 15:37    TPro  7.0  /  Alb  3.9  /  TBili  0.5  /  DBili  x   /  AST  41  /  ALT  132<H>  /  AlkPhos  239<H>  06-21    LIVER FUNCTIONS - ( 21 Jun 2023 15:37 )  Alb: 3.9 g/dL / Pro: 7.0 g/dL / ALK PHOS: 239 U/L / ALT: 132 U/L / AST: 41 U/L / GGT: x             Culture - Abscess with Gram Stain (collected 06-21-23 @ 19:50)  Source: .Abscess foot  Gram Stain (06-22-23 @ 04:54):    Moderate polymorphonuclear leukocytes seen per low power field    Moderate Gram positive cocci in pairs seen per oil power field    Rare Gram Negative Rods seen per oil power field      Urinalysis Basic - ( 21 Jun 2023 15:37 )    Color: x / Appearance: x / SG: x / pH: x  Gluc: 147 mg/dL / Ketone: x  / Bili: x / Urobili: x   Blood: x / Protein: x / Nitrite: x   Leuk Esterase: x / RBC: x / WBC x   Sq Epi: x / Non Sq Epi: x / Bacteria: x          RADIOLOGY & ADDITIONAL TESTS:  Personal review of radiological diagnostics performed  Echo and EKG results noted when applicable.     MEDICATIONS:  albuterol    90 MICROgram(s) HFA Inhaler 2 Puff(s) Inhalation every 6 hours PRN  aspirin  chewable 81 milliGRAM(s) Oral daily  budesonide  80 MICROgram(s)/formoterol 4.5 MICROgram(s) Inhaler 2 Puff(s) Inhalation two times a day  cefepime   IVPB 2000 milliGRAM(s) IV Intermittent every 8 hours  cefepime   IVPB      dextrose 5%. 1000 milliLiter(s) IV Continuous <Continuous>  dextrose 5%. 1000 milliLiter(s) IV Continuous <Continuous>  dextrose 50% Injectable 12.5 Gram(s) IV Push once  dextrose 50% Injectable 25 Gram(s) IV Push once  dextrose 50% Injectable 25 Gram(s) IV Push once  dextrose Oral Gel 15 Gram(s) Oral once PRN  enoxaparin Injectable 40 milliGRAM(s) SubCutaneous every 24 hours  glucagon  Injectable 1 milliGRAM(s) IntraMuscular once  insulin glargine Injectable (LANTUS) 10 Unit(s) SubCutaneous at bedtime  insulin lispro (ADMELOG) corrective regimen sliding scale   SubCutaneous three times a day before meals  insulin lispro Injectable (ADMELOG) 3 Unit(s) SubCutaneous three times a day before meals  lacosamide 100 milliGRAM(s) Oral two times a day  levETIRAcetam 750 milliGRAM(s) Oral two times a day  metroNIDAZOLE  IVPB 500 milliGRAM(s) IV Intermittent once  metroNIDAZOLE  IVPB 500 milliGRAM(s) IV Intermittent every 8 hours  metroNIDAZOLE  IVPB      pantoprazole    Tablet 40 milliGRAM(s) Oral before breakfast  prednisoLONE acetate 1% Suspension 1 Drop(s) Right EYE every 6 hours  vancomycin  IVPB 1250 milliGRAM(s) IV Intermittent every 12 hours      ANTIBIOTICS:  cefepime   IVPB 2000 milliGRAM(s) IV Intermittent every 8 hours  cefepime   IVPB      metroNIDAZOLE  IVPB      metroNIDAZOLE  IVPB 500 milliGRAM(s) IV Intermittent once  metroNIDAZOLE  IVPB 500 milliGRAM(s) IV Intermittent every 8 hours  vancomycin  IVPB 1250 milliGRAM(s) IV Intermittent every 12 hours

## 2023-06-22 NOTE — CONSULT NOTE ADULT - ASSESSMENT
74 yo M w  PMH of DM  HLD  HTN  bipolar disorder  GERD  OM  with toe amputations was in by his podiatrist due to non healing ulceration to right foot.  Pt was recently admitted to [facility] 5/23 for debridement of the same ulcer.  Pt was seen in ER for similar sx 6/17, followed up with his podiatrist Dr Bhatia today who referred pt back to ER. As per podiatry's note the ulcer had no purulent discharge on 6/17, but today has malodorous purulent discharge.      XR foot: Possible 1.1 cm gas containing collection seen abutting the first metatarsal transection (new compared to XR in 5/23). No new cortical destruction to suggest osteomyelitis  Art duplex 5/23: wnl 74 yo M w  PMH of DM  HLD  HTN  bipolar disorder  GERD  OM  with toe amputations was in by his podiatrist due to non healing ulceration to right foot.  Pt was recently admitted to [facility] 5/23 for debridement of the same ulcer.  Pt was seen in ER for similar sx 6/17, followed up with his podiatrist Dr Bhatia today who referred pt back to ER. As per podiatry's note the ulcer had no purulent discharge on 6/17, but today has malodorous purulent discharge.      XR foot: Possible 1.1 cm gas containing collection seen abutting the first metatarsal transection (new compared to XR in 5/23). No new cortical destruction to suggest osteomyelitis  Art duplex 5/23: wnl    IMPRESSION/RECOMMENDATIONS  Abscess right foot first met with associated chronic OM  6/18 GPC pairs, GNRs  5/18 Abscess: Strep mitis, E coli, Bacteroides  5/16 BCX NG  -f/u with Podiatry for surgical debridement with deep tissue/bone cultures and histopathology  -Rocephin 2 gm iv q12h for 2 dosis then 2 gm iv q24h  -flagyl 500 mg iv q8h

## 2023-06-22 NOTE — H&P ADULT - ATTENDING COMMENTS
Patient seen and examined at bedside independently of the residents. I read the resident's note and agree with the plan with the additions and corrections as noted below. My note supersedes the resident's note.     REVIEW OF SYSTEMS:  Negative except in HPI.       PMH: HTN, HLD, DM II, DFU with OM, Bipolar disorder, and GERD    FHx: Reviewed. No fhx of asthma/copd, No fhx of liver and pulmonary disease. No fhx of hematological disorder.     Physical Exam:  GEN: No acute distress. Awake, Alert and oriented x 3.   Head: Atraumatic, Normocephalic.   Eye: PEERLA. No sclera icterus. EOMI.   ENT: Normal oropharynx, no thyromegaly, no mass, no lymphadenopathy.   LUNGS: Clear to auscultation bilaterally. No wheeze/rales/crackles.   HEART: Normal. S1/S2 present. RRR. No murmur/gallops.   ABD: Soft, non-tender, non-distended. Bowel sounds present.   EXT: RLE 1+ pitting edema with erythema. Wrapped with ACE bandaged.   Integumentary: No rash, No sore, No petechia.   NEURO: CN III-XII intact. Strength: 5/5 b/l ULE. Sensory intact b/l ULE.     Vital Signs Last 24 Hrs  T(C): 37.9 (2023 00:18), Max: 37.9 (2023 00:18)  T(F): 100.2 (2023 00:18), Max: 100.2 (2023 00:18)  HR: 80 (2023 01:14) (80 - 97)  BP: 163/77 (2023 01:14) (163/77 - 199/102)  BP(mean): --  RR: 18 (2023 01:14) (18 - 18)  SpO2: 97% (2023 01:14) (97% - 99%)    Parameters below as of 2023 01:14  Patient On (Oxygen Delivery Method): room air      Please see the above notes for Labs and radiology.     Assessment and Plan:     72 yo M with hx of HTN, HLD, DM II, DFU with OM, Bipolar disorder, and GERD sent by his podiatry for non healing right DFU.     Right infected DFU with suspected OM   - f/u X-ray right foot  - c/w Vanc, cefepime and flagyl   - f/u BCx, Wound Cx and ESR/CRP  - check arterial duplex LE and venous duplex LE   - NPO after MN on 2023 for OR on 2023  - ID consult.   - F/u Podiatry     Pre-op risk stratification  - MET > 4, RCRI score 0 (Class 1 risk). Patient is 3.9% 30 days risk of death, MI or cardiac arrest.     Uncontrolled HTN   - c/w home med    HLD - c/w home med  DM II - monitor FS AC HS. Insulin regimen.   Bipolar disorder - c/w home med  GERD - PPI    DVT ppx: Lovenox SC  GI ppx: PPI  Diet: DASH/CC diet  Activity: as tolerated.     Date seen by the attendin2023  Total time spent: 75 minutes.

## 2023-06-22 NOTE — H&P ADULT - NSHPPHYSICALEXAM_GEN_ALL_CORE
Gen: NAD  HEENT: PERRL, EOMI, mouth clr, nose clr  Neck: no nodes, no JVD, thyroid nl  lungs: clr  hrt: s1 s2 rrr no murmur  abd: soft, NT/ND, no HS megaly  ext: DFU on the Rt foot  neuro: aa ox3, cn intact, can move all 4 ext

## 2023-06-22 NOTE — DISCHARGE NOTE NURSING/CASE MANAGEMENT/SOCIAL WORK - PATIENT PORTAL LINK FT
You can access the FollowMyHealth Patient Portal offered by [facility] by registering at the following website: [email or web address]. By joining [employer]’s FollowMyHealth portal, you will also be able to view your health information using other applications (apps) compatible with our system.

## 2023-06-23 ENCOUNTER — RESULT REVIEW (OUTPATIENT)
Age: 73
End: 2023-06-23

## 2023-06-23 LAB
ERYTHROCYTE [SEDIMENTATION RATE] IN BLOOD: 46 MM/HR — HIGH (ref 0–10)
GLUCOSE BLDC GLUCOMTR-MCNC: 122 MG/DL — HIGH (ref 70–99)
GLUCOSE BLDC GLUCOMTR-MCNC: 122 MG/DL — HIGH (ref 70–99)
GLUCOSE BLDC GLUCOMTR-MCNC: 129 MG/DL — HIGH (ref 70–99)
GLUCOSE BLDC GLUCOMTR-MCNC: 150 MG/DL — HIGH (ref 70–99)
GLUCOSE BLDC GLUCOMTR-MCNC: 294 MG/DL — HIGH (ref 70–99)

## 2023-06-23 PROCEDURE — 11044 DBRDMT BONE 1ST 20 SQ CM/<: CPT | Mod: 78,RT

## 2023-06-23 PROCEDURE — 88311 DECALCIFY TISSUE: CPT | Mod: 26

## 2023-06-23 PROCEDURE — 88304 TISSUE EXAM BY PATHOLOGIST: CPT | Mod: 26

## 2023-06-23 RX ORDER — METRONIDAZOLE 500 MG
500 TABLET ORAL ONCE
Refills: 0 | Status: COMPLETED | OUTPATIENT
Start: 2023-06-23 | End: 2023-06-23

## 2023-06-23 RX ORDER — LEVETIRACETAM 250 MG/1
750 TABLET, FILM COATED ORAL
Refills: 0 | Status: DISCONTINUED | OUTPATIENT
Start: 2023-06-23 | End: 2023-06-30

## 2023-06-23 RX ORDER — CEFAZOLIN SODIUM 1 G
2000 VIAL (EA) INJECTION EVERY 8 HOURS
Refills: 0 | Status: DISCONTINUED | OUTPATIENT
Start: 2023-06-23 | End: 2023-06-23

## 2023-06-23 RX ORDER — DEXTROSE 50 % IN WATER 50 %
15 SYRINGE (ML) INTRAVENOUS ONCE
Refills: 0 | Status: DISCONTINUED | OUTPATIENT
Start: 2023-06-23 | End: 2023-06-30

## 2023-06-23 RX ORDER — CEFAZOLIN SODIUM 1 G
2 VIAL (EA) INJECTION EVERY 8 HOURS
Refills: 0 | Status: DISCONTINUED | OUTPATIENT
Start: 2023-06-23 | End: 2023-06-23

## 2023-06-23 RX ORDER — LACOSAMIDE 50 MG/1
100 TABLET ORAL
Refills: 0 | Status: DISCONTINUED | OUTPATIENT
Start: 2023-06-23 | End: 2023-06-30

## 2023-06-23 RX ORDER — GLUCAGON INJECTION, SOLUTION 0.5 MG/.1ML
1 INJECTION, SOLUTION SUBCUTANEOUS ONCE
Refills: 0 | Status: DISCONTINUED | OUTPATIENT
Start: 2023-06-23 | End: 2023-06-30

## 2023-06-23 RX ORDER — PANTOPRAZOLE SODIUM 20 MG/1
40 TABLET, DELAYED RELEASE ORAL
Refills: 0 | Status: DISCONTINUED | OUTPATIENT
Start: 2023-06-23 | End: 2023-06-30

## 2023-06-23 RX ORDER — INSULIN LISPRO 100/ML
3 VIAL (ML) SUBCUTANEOUS
Refills: 0 | Status: DISCONTINUED | OUTPATIENT
Start: 2023-06-23 | End: 2023-06-27

## 2023-06-23 RX ORDER — BUDESONIDE AND FORMOTEROL FUMARATE DIHYDRATE 160; 4.5 UG/1; UG/1
2 AEROSOL RESPIRATORY (INHALATION)
Refills: 0 | Status: DISCONTINUED | OUTPATIENT
Start: 2023-06-23 | End: 2023-06-30

## 2023-06-23 RX ORDER — DEXTROSE 50 % IN WATER 50 %
25 SYRINGE (ML) INTRAVENOUS ONCE
Refills: 0 | Status: DISCONTINUED | OUTPATIENT
Start: 2023-06-23 | End: 2023-06-30

## 2023-06-23 RX ORDER — PREDNISOLONE SODIUM PHOSPHATE 1 %
1 DROPS OPHTHALMIC (EYE) EVERY 6 HOURS
Refills: 0 | Status: DISCONTINUED | OUTPATIENT
Start: 2023-06-23 | End: 2023-06-30

## 2023-06-23 RX ORDER — ALBUTEROL 90 UG/1
2 AEROSOL, METERED ORAL EVERY 6 HOURS
Refills: 0 | Status: DISCONTINUED | OUTPATIENT
Start: 2023-06-23 | End: 2023-06-30

## 2023-06-23 RX ORDER — INSULIN GLARGINE 100 [IU]/ML
10 INJECTION, SOLUTION SUBCUTANEOUS AT BEDTIME
Refills: 0 | Status: DISCONTINUED | OUTPATIENT
Start: 2023-06-23 | End: 2023-06-27

## 2023-06-23 RX ORDER — CEFAZOLIN SODIUM 1 G
2000 VIAL (EA) INJECTION EVERY 8 HOURS
Refills: 0 | Status: DISCONTINUED | OUTPATIENT
Start: 2023-06-23 | End: 2023-06-24

## 2023-06-23 RX ORDER — METRONIDAZOLE 500 MG
500 TABLET ORAL EVERY 8 HOURS
Refills: 0 | Status: DISCONTINUED | OUTPATIENT
Start: 2023-06-23 | End: 2023-06-24

## 2023-06-23 RX ORDER — INSULIN LISPRO 100/ML
VIAL (ML) SUBCUTANEOUS
Refills: 0 | Status: DISCONTINUED | OUTPATIENT
Start: 2023-06-23 | End: 2023-06-30

## 2023-06-23 RX ORDER — METRONIDAZOLE 500 MG
TABLET ORAL
Refills: 0 | Status: DISCONTINUED | OUTPATIENT
Start: 2023-06-23 | End: 2023-06-24

## 2023-06-23 RX ADMIN — Medication 3 UNIT(S): at 08:29

## 2023-06-23 RX ADMIN — ENOXAPARIN SODIUM 40 MILLIGRAM(S): 100 INJECTION SUBCUTANEOUS at 05:30

## 2023-06-23 RX ADMIN — LEVETIRACETAM 750 MILLIGRAM(S): 250 TABLET, FILM COATED ORAL at 05:29

## 2023-06-23 RX ADMIN — Medication 3 UNIT(S): at 17:21

## 2023-06-23 RX ADMIN — BUDESONIDE AND FORMOTEROL FUMARATE DIHYDRATE 2 PUFF(S): 160; 4.5 AEROSOL RESPIRATORY (INHALATION) at 22:04

## 2023-06-23 RX ADMIN — Medication 1 DROP(S): at 05:29

## 2023-06-23 RX ADMIN — Medication 100 MILLIGRAM(S): at 05:30

## 2023-06-23 RX ADMIN — CEFTRIAXONE 100 MILLIGRAM(S): 500 INJECTION, POWDER, FOR SOLUTION INTRAMUSCULAR; INTRAVENOUS at 05:30

## 2023-06-23 RX ADMIN — Medication 100 MILLIGRAM(S): at 22:05

## 2023-06-23 RX ADMIN — LEVETIRACETAM 750 MILLIGRAM(S): 250 TABLET, FILM COATED ORAL at 17:22

## 2023-06-23 RX ADMIN — LACOSAMIDE 100 MILLIGRAM(S): 50 TABLET ORAL at 05:29

## 2023-06-23 RX ADMIN — PANTOPRAZOLE SODIUM 40 MILLIGRAM(S): 20 TABLET, DELAYED RELEASE ORAL at 05:29

## 2023-06-23 RX ADMIN — Medication 100 MILLIGRAM(S): at 15:20

## 2023-06-23 RX ADMIN — Medication 1 DROP(S): at 17:22

## 2023-06-23 RX ADMIN — LACOSAMIDE 100 MILLIGRAM(S): 50 TABLET ORAL at 17:22

## 2023-06-23 NOTE — PRE-OP CHECKLIST - HEIGHT IN CM
Chief Complaint:  Chief Complaint   Patient presents with   • Follow-up     ct scan results.           History of Present Illness  Tammie Gallegos is a 70 year old female is an established patient who presents with history of abdominal pain.  Patient was evaluated back in December and reports it is intermittent sharp at times in the right flank area.  Patient is status post cholecystectomy at that time CT scan and pelvis was ordered for further evaluation.  Patient is here for report of imaging studies and further plan of care.     Review of Systems  Review of Systems   Constitutional: Negative.    HENT: Negative.    Eyes: Negative.    Respiratory: Negative.    Cardiovascular: Negative.    Gastrointestinal: Positive for abdominal pain.   Endocrine: Negative.    Genitourinary: Negative.    Musculoskeletal: Negative.         Right flank pain   Allergic/Immunologic: Negative.    Neurological: Negative.    Hematological: Negative.    Psychiatric/Behavioral: Negative.    All other systems reviewed and are negative.         Active Problems  Patient Active Problem List   Diagnosis   • Bilateral bunions   • Cataract, bilateral   • Diabetes mellitus with cataract (CMS/HCC)   • Hyperlipidemia associated with type 2 diabetes mellitus (CMS/[facility])   • Gastroesophageal reflux disease   • Type 2 diabetes mellitus with stage 2 chronic kidney disease, without long-term current use of insulin (CMS/[facility])   • Vitamin D deficiency   • Primary osteoarthritis of left knee   • Bilateral shoulder pain   • History of colon polyps   • Class 2 severe obesity due to excess calories with serious comorbidity and body mass index (BMI) of 35.0 to 35.9 in adult (CMS/[facility])   • Irritable bowel syndrome with diarrhea   • Right upper quadrant abdominal pain   • Nasal pain       Past Medical History  Past Medical History:   Diagnosis Date   • Patient has active power of  for health care     Ashvin Kuhn       Social History  Social History      Socioeconomic History   • Marital status: Single     Spouse name: Not on file   • Number of children: Not on file   • Years of education: Not on file   • Highest education level: Not on file   Occupational History   • Not on file   Tobacco Use   • Smoking status: Never Smoker   • Smokeless tobacco: Never Used   Substance and Sexual Activity   • Alcohol use: No   • Drug use: No   • Sexual activity: Not on file   Other Topics Concern   • Not on file   Social History Narrative   • Not on file     Social Determinants of Health     Financial Resource Strain:    • Social Determinants: Financial Resource Strain: Not on file   Food Insecurity:    • Social Determinants: Food Insecurity: Not on file   Transportation Needs:    • Lack of Transportation (Medical): Not on file   • Lack of Transportation (Non-Medical): Not on file   Physical Activity:    • Days of Exercise per Week: Not on file   • Minutes of Exercise per Session: Not on file   Stress:    • Social Determinants: Stress: Not on file   Social Connections:    • Social Determinants: Social Connections: Not on file   Intimate Partner Violence:    • Social Determinants: Intimate Partner Violence Past Fear: Not on file   • Social Determinants: Intimate Partner Violence Current Fear: Not on file       Family History  Family History   Problem Relation Age of Onset   • Hypertension Mother          Review  Past medical history, problem list, family medical history, surgical history and social history reviewed.       Current Meds  Current Outpatient Medications   Medication Sig Dispense Refill   • atorvastatin (LIPITOR) 10 MG tablet TAKE 1 TABLET BY MOUTH EVERY DAY 90 tablet 0   • metformin (GLUCOPHAGE) 1000 MG tablet Take 1 tablet by mouth 2 times daily (with meals). 180 tablet 1   • omeprazole (PRILOSEC) 40 MG capsule TAKE 1 CAPSULE BY MOUTH TWICE A DAY (Patient taking differently: as needed. ) 180 capsule 0   • Cholecalciferol (RA VITAMIN D-3) 5000 units Cap      •  vitamin B-12 (CYANOCOBALAMIN) 1000 MCG tablet        No current facility-administered medications for this visit.       Current Allergies      ALLERGIES:   Allergen Reactions   • Penicillins HIVES   • Cayenne   (Food Or Med) Other (See Comments)           Vitals  Visit Vitals  BP (!) 152/79   Pulse (!) 59   Ht 5' 5\" (1.651 m)   Wt 96.2 kg (212 lb)   BMI 35.28 kg/m²       Physical Exam  Physical Exam  Constitutional:       General: She is not in acute distress.     Appearance: She is well-developed. She is not diaphoretic.   HENT:      Head: Normocephalic.      Nose: Nose normal.   Eyes:      General: No scleral icterus.        Right eye: No discharge.         Left eye: No discharge.      Conjunctiva/sclera: Conjunctivae normal.      Pupils: Pupils are equal, round, and reactive to light.   Neck:      Thyroid: No thyromegaly.      Vascular: No JVD.      Trachea: No tracheal deviation.   Cardiovascular:      Rate and Rhythm: Normal rate and regular rhythm.      Heart sounds: Normal heart sounds.   Pulmonary:      Effort: Pulmonary effort is normal. No respiratory distress.      Breath sounds: Normal breath sounds. No stridor. No wheezing or rales.   Chest:      Chest wall: No tenderness.   Abdominal:      General: Bowel sounds are normal. There is no distension.      Palpations: Abdomen is soft. There is no mass.      Tenderness: There is no abdominal tenderness. There is no guarding or rebound.      Comments: Scars well-healed from prior surgeries.  No evidence of hernia   Genitourinary:     Rectum: Normal.   Musculoskeletal:         General: No tenderness or deformity. Normal range of motion.      Cervical back: Normal range of motion and neck supple.   Lymphadenopathy:      Cervical: No cervical adenopathy.   Skin:     General: Skin is warm and dry.      Coloration: Skin is not pale.      Findings: No erythema or rash.   Neurological:      Mental Status: She is alert and oriented to person, place, and time.       Cranial Nerves: No cranial nerve deficit.      Coordination: Coordination normal.   Psychiatric:         Behavior: Behavior normal.         Thought Content: Thought content normal.         Judgment: Judgment normal.             Assessment  Right upper quadrant abdominal pain      Plan  70-year-old female who presents with history of right flank right upper quadrant pain.  CT scan and pelvis was performed which reveals no evidence of any acute abnormality.  There is evidence of a renal cyst.  No evidence of any abnormality of the bowel and is no evidence of any hernias.  I would recommend that she follow-up with PCP if urology consult is indicated for renal cyst.  Is not clear whether source of her symptoms are secondary to scar tissue or even musculoskeletal etiology.  Patient will contact PCP for further care.      Risks, benefits, alternatives, expectations and preparations were discussed with the patient, who understands and agrees.   I plan to not operate as referred because agreement of diagnosis   Medical compliance with plan discussed and risks of non-compliance reviewed.    Patient education completed on disease process, etiology & prognosis.    Patient expresses understanding of the plan.    Return to clinic as clinically indicated as discussed with patient who verbalized understanding of & agreement with the plan.      Signatures  Mario Rose MD     180.3 180.34

## 2023-06-23 NOTE — PROGRESS NOTE ADULT - SUBJECTIVE AND OBJECTIVE BOX
JOSÉ MANUEL PORTER  73y, Male    All available historical data reviewed    OVERNIGHT EVENTS:  feels well and has no new complaints  No fevers     ROS:  General: Denies rigors, nightsweats  HEENT: Denies headache, rhinorrhea, sore throat, eye pain  CV: Denies CP, palpitations  PULM: Denies wheezing, hemoptysis  GI: Denies hematemesis, hematochezia, melena  : Denies discharge, hematuria  MSK: Denies arthralgias, myalgias  SKIN: Denies rash, lesions  NEURO: Denies paresthesias, weakness  PSYCH: Denies depression, anxiety    VITALS:  T(F): 97.1, Max: 97.1 (06-22-23 @ 13:00)  HR: 73  BP: 139/65  RR: 18Vital Signs Last 24 Hrs  T(C): 36.2 (23 Jun 2023 10:39), Max: 36.2 (22 Jun 2023 13:00)  T(F): 97.1 (23 Jun 2023 10:39), Max: 97.1 (22 Jun 2023 13:00)  HR: 73 (23 Jun 2023 10:39) (69 - 74)  BP: 139/65 (23 Jun 2023 10:39) (120/72 - 139/65)  BP(mean): --  RR: 18 (23 Jun 2023 05:00) (18 - 18)  SpO2: 97% (22 Jun 2023 13:00) (97% - 97%)        TESTS & MEASUREMENTS:                        11.1   8.15  )-----------( 260      ( 22 Jun 2023 21:06 )             33.6     06-22    140  |  105  |  14  ----------------------------<  142<H>  4.8   |  25  |  1.0    Ca    8.4      22 Jun 2023 21:06  Mg     2.2     06-22    TPro  6.1  /  Alb  3.5  /  TBili  0.6  /  DBili  x   /  AST  22  /  ALT  88<H>  /  AlkPhos  186<H>  06-22    LIVER FUNCTIONS - ( 22 Jun 2023 06:54 )  Alb: 3.5 g/dL / Pro: 6.1 g/dL / ALK PHOS: 186 U/L / ALT: 88 U/L / AST: 22 U/L / GGT: 136 U/L         Culture - Abscess with Gram Stain (collected 06-21-23 @ 19:50)  Source: .Abscess foot  Gram Stain (06-22-23 @ 04:54):    Moderate polymorphonuclear leukocytes seen per low power field    Moderate Gram positive cocci in pairs seen per oil power field    Rare Gram Negative Rods seen per oil power field  Preliminary Report (06-23-23 @ 10:41):    Numerous Gram Negative Rods    Numerous Staphylococcus aureus    Few Gram Negative Rods #2    Culture - Blood (collected 06-21-23 @ 15:36)  Source: .Blood Blood  Gram Stain (06-22-23 @ 15:30):    Growth in aerobic bottle: Gram Positive Cocci in Clusters    Growth in anaerobic bottle: Gram Positive Cocci in Clusters  Preliminary Report (06-22-23 @ 15:30):    Growth in aerobic bottle: Gram Positive Cocci in Clusters    Growth in anaerobic bottle: Gram Positive Cocci in Clusters    Culture - Blood (collected 06-21-23 @ 15:36)  Source: .Blood Blood  Gram Stain (06-22-23 @ 12:23):    Growth in aerobic bottle: Gram Positive Cocci in Clusters and Gram    Positive Cocci in Pairs and Chains    Growth in anaerobic bottle: Gram Positive Cocci in Pairs and Chains  Preliminary Report (06-22-23 @ 12:23):    Growth in aerobic bottle: Gram Positive Cocci in Clusters and Gram    Positive Cocci in Pairs and Chains    Growth in anaerobic bottle: Gram Positive Cocci in Pairs and Chains    ***Blood Panel PCR results on this specimen are available    approximately 3 hours after the Gram stain result.***    Gram stain, PCR, and/or culture results may not always    correspond due to difference in methodologies.    ************************************************************    This PCR assay was performed by multiplex PCR.This    Assay tests for 66 bacterial and resistance gene targets.    Please refer to the [facility] test directory    at [email or web address] for details.  Organism: Blood Culture PCR (06-22-23 @ 10:50)  Organism: Blood Culture PCR (06-22-23 @ 10:50)      Method Type: PCR      -  Methicillin SENSITIVE Staphylococcus aureus (MSSA): Detec Any isolate of Staphylococcus aureus from a blood culture is NOT considered a contaminant.      -  Streptococcus sp. (Not Grp A, B or S pneumoniae): Detec      Urinalysis Basic - ( 22 Jun 2023 21:06 )    Color: x / Appearance: x / SG: x / pH: x  Gluc: 142 mg/dL / Ketone: x  / Bili: x / Urobili: x   Blood: x / Protein: x / Nitrite: x   Leuk Esterase: x / RBC: x / WBC x   Sq Epi: x / Non Sq Epi: x / Bacteria: x          RADIOLOGY & ADDITIONAL TESTS:  Personal review of radiological diagnostics performed  Echo and EKG results noted when applicable.     MEDICATIONS:  albuterol    90 MICROgram(s) HFA Inhaler 2 Puff(s) Inhalation every 6 hours PRN  aspirin  chewable 81 milliGRAM(s) Oral daily  budesonide  80 MICROgram(s)/formoterol 4.5 MICROgram(s) Inhaler 2 Puff(s) Inhalation two times a day  cefTRIAXone   IVPB 2000 milliGRAM(s) IV Intermittent every 12 hours  dextrose 5%. 1000 milliLiter(s) IV Continuous <Continuous>  dextrose 5%. 1000 milliLiter(s) IV Continuous <Continuous>  dextrose 50% Injectable 25 Gram(s) IV Push once  dextrose 50% Injectable 12.5 Gram(s) IV Push once  dextrose 50% Injectable 25 Gram(s) IV Push once  dextrose Oral Gel 15 Gram(s) Oral once PRN  enoxaparin Injectable 40 milliGRAM(s) SubCutaneous every 24 hours  glucagon  Injectable 1 milliGRAM(s) IntraMuscular once  insulin glargine Injectable (LANTUS) 10 Unit(s) SubCutaneous at bedtime  insulin lispro (ADMELOG) corrective regimen sliding scale   SubCutaneous three times a day before meals  insulin lispro Injectable (ADMELOG) 3 Unit(s) SubCutaneous three times a day before meals  lacosamide 100 milliGRAM(s) Oral two times a day  levETIRAcetam 750 milliGRAM(s) Oral two times a day  metroNIDAZOLE  IVPB      metroNIDAZOLE  IVPB 500 milliGRAM(s) IV Intermittent once  metroNIDAZOLE  IVPB 500 milliGRAM(s) IV Intermittent every 8 hours  pantoprazole    Tablet 40 milliGRAM(s) Oral before breakfast  prednisoLONE acetate 1% Suspension 1 Drop(s) Right EYE every 6 hours      ANTIBIOTICS:  cefTRIAXone   IVPB 2000 milliGRAM(s) IV Intermittent every 12 hours  metroNIDAZOLE  IVPB      metroNIDAZOLE  IVPB 500 milliGRAM(s) IV Intermittent once  metroNIDAZOLE  IVPB 500 milliGRAM(s) IV Intermittent every 8 hours

## 2023-06-23 NOTE — PROGRESS NOTE ADULT - SUBJECTIVE AND OBJECTIVE BOX
24H events:    Patient is a 73y old Male who presents with a chief complaint of   Primary diagnosis of Diabetic ulcer of right foot    Today is hospital day 2d. This morning patient was seen and examined at bedside, resting comfortably in bed.    Underwent debridement in the OR today    Code Status: Full code    PAST MEDICAL & SURGICAL HISTORY  DM (diabetes mellitus)  Type 2, 12/27/18 hgb aic  11.2    HTN (hypertension)    Dyslipidemia    Diabetic foot ulcer    Depression    GERD (gastroesophageal reflux disease)    Neuropathy, diabetic    Toe amputation status, right  (2008)    History of amputation of toe  LT -partial 1st toe        ALLERGIES:  No Known Allergies    MEDICATIONS:  STANDING MEDICATIONS  budesonide  80 MICROgram(s)/formoterol 4.5 MICROgram(s) Inhaler 2 Puff(s) Inhalation two times a day  ceFAZolin   IVPB 2000 milliGRAM(s) IV Intermittent every 8 hours  dextrose 50% Injectable 25 Gram(s) IV Push once  glucagon  Injectable 1 milliGRAM(s) IntraMuscular once  insulin glargine Injectable (LANTUS) 10 Unit(s) SubCutaneous at bedtime  insulin lispro (ADMELOG) corrective regimen sliding scale   SubCutaneous three times a day before meals  insulin lispro Injectable (ADMELOG) 3 Unit(s) SubCutaneous three times a day before meals  lacosamide 100 milliGRAM(s) Oral two times a day  levETIRAcetam 750 milliGRAM(s) Oral two times a day  metroNIDAZOLE  IVPB      metroNIDAZOLE  IVPB 500 milliGRAM(s) IV Intermittent every 8 hours  pantoprazole    Tablet 40 milliGRAM(s) Oral before breakfast  prednisoLONE acetate 1% Suspension 1 Drop(s) Right EYE every 6 hours    PRN MEDICATIONS  albuterol    90 MICROgram(s) HFA Inhaler 2 Puff(s) Inhalation every 6 hours PRN  dextrose Oral Gel 15 Gram(s) Oral once PRN    VITALS:   T(F): 98  HR: 70  BP: 132/66  RR: 18  SpO2: 98%    PHYSICAL EXAM:  Gen: NAD  HEENT: PERRL, EOMI, mouth clr, nose clr  Neck: no nodes, no JVD, thyroid nl  lungs: clr  hrt: s1 s2 rrr no murmur  abd: soft, NT/ND, no HS megaly  ext: DFU on the Rt foot  neuro: aa ox3, cn intact, can move all 4 ex    LABS:                        11.1   8.15  )-----------( 260      ( 22 Jun 2023 21:06 )             33.6     06-22    140  |  105  |  14  ----------------------------<  142<H>  4.8   |  25  |  1.0    Ca    8.4      22 Jun 2023 21:06  Mg     2.2     06-22    TPro  6.1  /  Alb  3.5  /  TBili  0.6  /  DBili  x   /  AST  22  /  ALT  88<H>  /  AlkPhos  186<H>  06-22    PT/INR - ( 22 Jun 2023 21:06 )   PT: 13.10 sec;   INR: 1.14 ratio         PTT - ( 22 Jun 2023 21:06 )  PTT:33.4 sec  Urinalysis Basic - ( 22 Jun 2023 21:06 )    Color: x / Appearance: x / SG: x / pH: x  Gluc: 142 mg/dL / Ketone: x  / Bili: x / Urobili: x   Blood: x / Protein: x / Nitrite: x   Leuk Esterase: x / RBC: x / WBC x   Sq Epi: x / Non Sq Epi: x / Bacteria: x        Sedimentation Rate, Erythrocyte: 46 mm/Hr *H* (06-22-23 @ 21:06)      Culture - Abscess with Gram Stain (collected 21 Jun 2023 19:50)  Source: .Abscess foot  Gram Stain (22 Jun 2023 04:54):    Moderate polymorphonuclear leukocytes seen per low power field    Moderate Gram positive cocci in pairs seen per oil power field    Rare Gram Negative Rods seen per oil power field  Preliminary Report (23 Jun 2023 15:58):    Numerous Pseudomonas aeruginosa    Numerous Staphylococcus aureus    Few Gram Negative Rods #2    Moderate Corynebacterium striatum group "Susceptibilities not performed"    Culture - Blood (collected 21 Jun 2023 15:36)  Source: .Blood Blood  Gram Stain (22 Jun 2023 15:30):    Growth in aerobic bottle: Gram Positive Cocci in Clusters    Growth in anaerobic bottle: Gram Positive Cocci in Clusters  Preliminary Report (23 Jun 2023 15:19):    Growth in aerobic and anaerobic bottles: Staphylococcus aureus    Susceptibility to follow.    Culture - Blood (collected 21 Jun 2023 15:36)  Source: .Blood Blood  Gram Stain (22 Jun 2023 12:23):    Growth in aerobic bottle: Gram Positive Cocci in Clusters and Gram    Positive Cocci in Pairs and Chains    Growth in anaerobic bottle: Gram Positive Cocci in Pairs and Chains  Preliminary Report (23 Jun 2023 12:19):    Growth in aerobic bottle: Staphylococcus aureus Susceptibility to follow.    Growth in anaerobic bottle: Gram Positive Cocci in Pairs and Chains Most    closely resembling Alpha hemolytic strep "Susceptibilities not performed"    Growth in aerobic bottle: Gram Positive Cocci in Clusters and Gram    Positive Cocci in Pairs and Chains    ***Blood Panel PCR results on this specimen are available    approximately 3 hours after the Gram stain result.***    Gram stain, PCR, and/or culture results may not always    correspond due to difference in methodologies.    ************************************************************    This PCR assay was performed by multiplex PCR. This    Assay tests for 66 bacterial and resistance gene targets.    Please refer to the [facility] test directory    at [email or web address] for details.  Organism: Blood Culture PCR (22 Jun 2023 10:50)  Organism: Blood Culture PCR (22 Jun 2023 10:50)        RADIOLOGY    6/21 XR right foot:   The patient is again status post transmetatarsal amputation of all rays. There is evidence of skin ulceration at the amputation margin around the first metatarsal stump. There is no definite deep/tracking soft tissue gas. Osteomyelitis of the first metatarsal stump is difficult to exclude. A surgical staple is again noted between the first and second metatarsal stumps.

## 2023-06-23 NOTE — PROGRESS NOTE ADULT - ASSESSMENT
72 yo M w  PMH of DM  HLD  HTN  bipolar disorder  GERD  OM  with toe amputations was in by his podiatrist due to non healing ulceration to right foot.     DFU R foot  OM?  XR R foot :   Possible 1.1 cm gas containing collection seen abutting the first metatarsal transection (new compared to XR in 5/23)   No new cortical destruction to suggest osteomyelitis  art duplex 5/23: wnl  - vanc 1250 q12  +  cefepime 2 q8  +  flagyl 500 q8  - fu BCx    WCx  - fu ESR  CRP  - fu MR R foot  - podiatry: Excisional debridement of soft tissue done today (6/23)  - Wound and blood cultures show MSSA > Echo order to r/o IE  - Daily bcx until -ve    ++alk phos and ALP  possible from bone path (OM)  - fu GGT    DMT2  - Started on 10u Lantus and 3u Lispro premeal w/ ISS  - monitor FS, adjust PRN    Asthma  - not in exacerbation  - c/w inhalers    Seizure Disorder  - c/w home Keppra 750mg BID  - c/w home lacosamide 100mg BID    HO Bipolar Depression  - not on meds  - not in manic or depression state    GERD  - therapeutic exchange for home omeprazole    DVT ppx: lovenox  GI ppx: PPI  Diet: DASH/TLC/CC  Activity: AAT  Code Status: Full Code    To Do:  -F/u on daily bcx  -F/u on echo

## 2023-06-23 NOTE — CHART NOTE - NSCHARTNOTEFT_GEN_A_CORE
PACU ANESTHESIA ADMISSION NOTE      Procedure:   Post op diagnosis:      ____  Intubated  TV:______       Rate: ______      FiO2: ______    __x__  Patent Airway    __x__  Full return of protective reflexes    __x__  Full recovery from anesthesia / back to baseline     Vitals:   T:   36.2        R:  11                BP:  134/74                Sat:   100                P: 76      Mental Status:  __x_ Awake   ___x_ Alert   _____ Drowsy   _____ Sedated    Nausea/Vomiting:  _x__ NO  ______Yes,   See Post - Op Orders          Pain Scale (0-10):  ___x__    Treatment: ____ None    ____ See Post - Op/PCA Orders    Post - Operative Fluids:   ____ Oral   __x__ See Post - Op Orders    Plan: Discharge:   ____Home      x _____Floor     _____Critical Care    _____  Other:_________________    Comments:

## 2023-06-23 NOTE — PROGRESS NOTE ADULT - ASSESSMENT
· Assessment	  72 yo M w  PMH of DM  HLD  HTN  bipolar disorder  GERD  OM  with toe amputations was in by his podiatrist due to non healing ulceration to right foot.  Pt was recently admitted to [facility] 5/23 for debridement of the same ulcer.  Pt was seen in ER for similar sx 6/17, followed up with his podiatrist Dr Bhatia today who referred pt back to ER. As per podiatry's note the ulcer had no purulent discharge on 6/17, but today has malodorous purulent discharge.      XR foot: Possible 1.1 cm gas containing collection seen abutting the first metatarsal transection (new compared to XR in 5/23). No new cortical destruction to suggest osteomyelitis  Art duplex 5/23: wnl    IMPRESSION/RECOMMENDATIONS  Abscess right foot first met with associated chronic OM  6/21 JACOB, Strep  Etiology of the JACOB bacteremia is the infection right foot  R/o endocarditis  6/18 S aureus, GNRs  6/21 ESR/CRP 46/77.3  5/18 Abscess: Strep mitis, E coli, Bacteroides    -f/u with Podiatry for surgical debridement with deep tissue/bone cultures and histopathology  -f/u BCX  -ECHO  -Daily BCx till repeatedly negative starting 6/25  -start Ancef 2 gm iv q8h  -D/c rocephin   -flagyl 500 mg iv q8h

## 2023-06-24 LAB
-  AMIKACIN: SIGNIFICANT CHANGE UP
-  AMPICILLIN/SULBACTAM: SIGNIFICANT CHANGE UP
-  AZTREONAM: SIGNIFICANT CHANGE UP
-  CEFAZOLIN: SIGNIFICANT CHANGE UP
-  CEFEPIME: SIGNIFICANT CHANGE UP
-  CEFTAZIDIME: SIGNIFICANT CHANGE UP
-  CIPROFLOXACIN: SIGNIFICANT CHANGE UP
-  CLINDAMYCIN: SIGNIFICANT CHANGE UP
-  ERYTHROMYCIN: SIGNIFICANT CHANGE UP
-  GENTAMICIN: SIGNIFICANT CHANGE UP
-  IMIPENEM: SIGNIFICANT CHANGE UP
-  LEVOFLOXACIN: SIGNIFICANT CHANGE UP
-  MEROPENEM: SIGNIFICANT CHANGE UP
-  OXACILLIN: SIGNIFICANT CHANGE UP
-  PENICILLIN: SIGNIFICANT CHANGE UP
-  PIPERACILLIN/TAZOBACTAM: SIGNIFICANT CHANGE UP
-  RIFAMPIN: SIGNIFICANT CHANGE UP
-  TETRACYCLINE: SIGNIFICANT CHANGE UP
-  TOBRAMYCIN: SIGNIFICANT CHANGE UP
-  TRIMETHOPRIM/SULFAMETHOXAZOLE: SIGNIFICANT CHANGE UP
-  VANCOMYCIN: SIGNIFICANT CHANGE UP
ALBUMIN SERPL ELPH-MCNC: 3 G/DL — LOW (ref 3.5–5.2)
ALP SERPL-CCNC: 148 U/L — HIGH (ref 30–115)
ALT FLD-CCNC: 40 U/L — SIGNIFICANT CHANGE UP (ref 0–41)
ANION GAP SERPL CALC-SCNC: 10 MMOL/L — SIGNIFICANT CHANGE UP (ref 7–14)
AST SERPL-CCNC: 15 U/L — SIGNIFICANT CHANGE UP (ref 0–41)
BASOPHILS # BLD AUTO: 0.03 K/UL — SIGNIFICANT CHANGE UP (ref 0–0.2)
BASOPHILS NFR BLD AUTO: 0.5 % — SIGNIFICANT CHANGE UP (ref 0–1)
BILIRUB SERPL-MCNC: 0.2 MG/DL — SIGNIFICANT CHANGE UP (ref 0.2–1.2)
BUN SERPL-MCNC: 14 MG/DL — SIGNIFICANT CHANGE UP (ref 10–20)
CALCIUM SERPL-MCNC: 8.4 MG/DL — SIGNIFICANT CHANGE UP (ref 8.4–10.5)
CHLORIDE SERPL-SCNC: 104 MMOL/L — SIGNIFICANT CHANGE UP (ref 98–110)
CO2 SERPL-SCNC: 25 MMOL/L — SIGNIFICANT CHANGE UP (ref 17–32)
CREAT SERPL-MCNC: 1 MG/DL — SIGNIFICANT CHANGE UP (ref 0.7–1.5)
CULTURE RESULTS: SIGNIFICANT CHANGE UP
CULTURE RESULTS: SIGNIFICANT CHANGE UP
EGFR: 78 ML/MIN/1.73M2 — SIGNIFICANT CHANGE UP
EOSINOPHIL # BLD AUTO: 0.06 K/UL — SIGNIFICANT CHANGE UP (ref 0–0.7)
EOSINOPHIL NFR BLD AUTO: 0.9 % — SIGNIFICANT CHANGE UP (ref 0–8)
GLUCOSE BLDC GLUCOMTR-MCNC: 136 MG/DL — HIGH (ref 70–99)
GLUCOSE BLDC GLUCOMTR-MCNC: 218 MG/DL — HIGH (ref 70–99)
GLUCOSE BLDC GLUCOMTR-MCNC: 294 MG/DL — HIGH (ref 70–99)
GLUCOSE BLDC GLUCOMTR-MCNC: 96 MG/DL — SIGNIFICANT CHANGE UP (ref 70–99)
GLUCOSE SERPL-MCNC: 249 MG/DL — HIGH (ref 70–99)
HCT VFR BLD CALC: 34.4 % — LOW (ref 42–52)
HGB BLD-MCNC: 11.2 G/DL — LOW (ref 14–18)
IMM GRANULOCYTES NFR BLD AUTO: 3.1 % — HIGH (ref 0.1–0.3)
LYMPHOCYTES # BLD AUTO: 0.83 K/UL — LOW (ref 1.2–3.4)
LYMPHOCYTES # BLD AUTO: 12.8 % — LOW (ref 20.5–51.1)
MAGNESIUM SERPL-MCNC: 2 MG/DL — SIGNIFICANT CHANGE UP (ref 1.8–2.4)
MCHC RBC-ENTMCNC: 29.2 PG — SIGNIFICANT CHANGE UP (ref 27–31)
MCHC RBC-ENTMCNC: 32.6 G/DL — SIGNIFICANT CHANGE UP (ref 32–37)
MCV RBC AUTO: 89.6 FL — SIGNIFICANT CHANGE UP (ref 80–94)
METHOD TYPE: SIGNIFICANT CHANGE UP
MONOCYTES # BLD AUTO: 0.55 K/UL — SIGNIFICANT CHANGE UP (ref 0.1–0.6)
MONOCYTES NFR BLD AUTO: 8.5 % — SIGNIFICANT CHANGE UP (ref 1.7–9.3)
NEUTROPHILS # BLD AUTO: 4.82 K/UL — SIGNIFICANT CHANGE UP (ref 1.4–6.5)
NEUTROPHILS NFR BLD AUTO: 74.2 % — SIGNIFICANT CHANGE UP (ref 42.2–75.2)
NIGHT BLUE STAIN TISS: SIGNIFICANT CHANGE UP
NRBC # BLD: 0 /100 WBCS — SIGNIFICANT CHANGE UP (ref 0–0)
ORGANISM # SPEC MICROSCOPIC CNT: SIGNIFICANT CHANGE UP
PLATELET # BLD AUTO: 268 K/UL — SIGNIFICANT CHANGE UP (ref 130–400)
PMV BLD: 10.5 FL — HIGH (ref 7.4–10.4)
POTASSIUM SERPL-MCNC: 4.7 MMOL/L — SIGNIFICANT CHANGE UP (ref 3.5–5)
POTASSIUM SERPL-SCNC: 4.7 MMOL/L — SIGNIFICANT CHANGE UP (ref 3.5–5)
PROT SERPL-MCNC: 5.7 G/DL — LOW (ref 6–8)
RBC # BLD: 3.84 M/UL — LOW (ref 4.7–6.1)
RBC # FLD: 13 % — SIGNIFICANT CHANGE UP (ref 11.5–14.5)
SODIUM SERPL-SCNC: 139 MMOL/L — SIGNIFICANT CHANGE UP (ref 135–146)
SPECIMEN SOURCE: SIGNIFICANT CHANGE UP
WBC # BLD: 6.49 K/UL — SIGNIFICANT CHANGE UP (ref 4.8–10.8)
WBC # FLD AUTO: 6.49 K/UL — SIGNIFICANT CHANGE UP (ref 4.8–10.8)

## 2023-06-24 PROCEDURE — 99233 SBSQ HOSP IP/OBS HIGH 50: CPT

## 2023-06-24 RX ORDER — PIPERACILLIN AND TAZOBACTAM 4; .5 G/20ML; G/20ML
3.38 INJECTION, POWDER, LYOPHILIZED, FOR SOLUTION INTRAVENOUS ONCE
Refills: 0 | Status: COMPLETED | OUTPATIENT
Start: 2023-06-25 | End: 2023-06-25

## 2023-06-24 RX ORDER — PIPERACILLIN AND TAZOBACTAM 4; .5 G/20ML; G/20ML
3.38 INJECTION, POWDER, LYOPHILIZED, FOR SOLUTION INTRAVENOUS ONCE
Refills: 0 | Status: COMPLETED | OUTPATIENT
Start: 2023-06-24 | End: 2023-06-24

## 2023-06-24 RX ORDER — PIPERACILLIN AND TAZOBACTAM 4; .5 G/20ML; G/20ML
3.38 INJECTION, POWDER, LYOPHILIZED, FOR SOLUTION INTRAVENOUS EVERY 8 HOURS
Refills: 0 | Status: DISCONTINUED | OUTPATIENT
Start: 2023-06-25 | End: 2023-06-26

## 2023-06-24 RX ADMIN — LACOSAMIDE 100 MILLIGRAM(S): 50 TABLET ORAL at 05:56

## 2023-06-24 RX ADMIN — Medication 100 MILLIGRAM(S): at 05:56

## 2023-06-24 RX ADMIN — PIPERACILLIN AND TAZOBACTAM 200 GRAM(S): 4; .5 INJECTION, POWDER, LYOPHILIZED, FOR SOLUTION INTRAVENOUS at 14:18

## 2023-06-24 RX ADMIN — INSULIN GLARGINE 10 UNIT(S): 100 INJECTION, SOLUTION SUBCUTANEOUS at 00:12

## 2023-06-24 RX ADMIN — Medication 1 DROP(S): at 05:57

## 2023-06-24 RX ADMIN — LACOSAMIDE 100 MILLIGRAM(S): 50 TABLET ORAL at 17:20

## 2023-06-24 RX ADMIN — Medication 2: at 08:15

## 2023-06-24 RX ADMIN — LEVETIRACETAM 750 MILLIGRAM(S): 250 TABLET, FILM COATED ORAL at 17:20

## 2023-06-24 RX ADMIN — Medication 1 DROP(S): at 17:22

## 2023-06-24 RX ADMIN — LEVETIRACETAM 750 MILLIGRAM(S): 250 TABLET, FILM COATED ORAL at 05:55

## 2023-06-24 RX ADMIN — Medication 3 UNIT(S): at 17:20

## 2023-06-24 RX ADMIN — Medication 3 UNIT(S): at 12:05

## 2023-06-24 RX ADMIN — PANTOPRAZOLE SODIUM 40 MILLIGRAM(S): 20 TABLET, DELAYED RELEASE ORAL at 06:03

## 2023-06-24 RX ADMIN — INSULIN GLARGINE 10 UNIT(S): 100 INJECTION, SOLUTION SUBCUTANEOUS at 21:36

## 2023-06-24 RX ADMIN — Medication 3 UNIT(S): at 08:15

## 2023-06-24 RX ADMIN — Medication 1 DROP(S): at 12:06

## 2023-06-24 RX ADMIN — Medication 3: at 12:05

## 2023-06-24 RX ADMIN — PIPERACILLIN AND TAZOBACTAM 25 GRAM(S): 4; .5 INJECTION, POWDER, LYOPHILIZED, FOR SOLUTION INTRAVENOUS at 17:20

## 2023-06-24 NOTE — PROGRESS NOTE ADULT - SUBJECTIVE AND OBJECTIVE BOX
JOSÉ MANUEL PORTER  73y  [facility]-N 3A 007 B      Patient is a 73y old  Male who presents with a chief complaint of     INTERVAL HPI/OVERNIGHT EVENTS:  Patient feels well.   he's reporting pain in the right foot likely due to infection   no other complains noted         FAMILY HISTORY:  Family history of lung cancer (Mother)    Family history of ischemic heart disease (IHD) (Father)      T(C): 36.2 (06-24-23 @ 06:16), Max: 36.7 (06-23-23 @ 14:30)  HR: 68 (06-24-23 @ 06:16) (68 - 75)  BP: 109/53 (06-24-23 @ 06:16) (109/53 - 152/78)  RR: 18 (06-24-23 @ 06:16) (16 - 18)  SpO2: 99% (06-24-23 @ 06:16) (97% - 99%)  Wt(kg): --Vital Signs Last 24 Hrs  T(C): 36.2 (24 Jun 2023 06:16), Max: 36.7 (23 Jun 2023 14:30)  T(F): 97.1 (24 Jun 2023 06:16), Max: 98 (23 Jun 2023 14:30)  HR: 68 (24 Jun 2023 06:16) (68 - 75)  BP: 109/53 (24 Jun 2023 06:16) (109/53 - 152/78)  BP(mean): --  RR: 18 (24 Jun 2023 06:16) (16 - 18)  SpO2: 99% (24 Jun 2023 06:16) (97% - 99%)        PHYSICAL EXAM:  GENERAL: NAD, well-groomed, well-developed  NERVOUS SYSTEM:  Alert & Oriented X3, Good concentration; Motor Strength 5/5 B/L upper and lower extremities; DTRs 2+ intact and symmetric  PULM: Clear to auscultation bilaterally  CARDIAC: Regular rate and rhythm; No murmurs, rubs, or gallops  GI: Soft, Nontender, Nondistended; Bowel sounds present  EXTREMITIES: no clear signs of arterial insuff, left foot toe amputation noted, right foot covered     Consultant(s) Notes Reviewed:  [x ] YES  [ ] NO  Care Discussed with Consultants/Other Providers [ x] YES  [ ] NO    LABS:                            11.2   6.49  )-----------( 268      ( 24 Jun 2023 07:10 )             34.4   06-24    139  |  104  |  14  ----------------------------<  249<H>  4.7   |  25  |  1.0    Ca    8.4      24 Jun 2023 07:10  Mg     2.0     06-24    TPro  5.7<L>  /  Alb  3.0<L>  /  TBili  0.2  /  DBili  x   /  AST  15  /  ALT  40  /  AlkPhos  148<H>  06-24            Culture - Abscess with Gram Stain (collected 21 Jun 2023 19:50)  Source: .Abscess foot  Gram Stain (22 Jun 2023 04:54):    Moderate polymorphonuclear leukocytes seen per low power field    Moderate Gram positive cocci in pairs seen per oil power field    Rare Gram Negative Rods seen per oil power field  Preliminary Report (23 Jun 2023 15:58):    Numerous Pseudomonas aeruginosa    Numerous Staphylococcus aureus    Few Gram Negative Rods #2    Moderate Corynebacterium striatum group "Susceptibilities not performed"  Organism: Pseudomonas aeruginosa  Staphylococcus aureus (24 Jun 2023 10:07)  Organism: Staphylococcus aureus (24 Jun 2023 10:07)  Organism: Pseudomonas aeruginosa (24 Jun 2023 10:06)    Culture - Blood (collected 21 Jun 2023 15:36)  Source: .Blood Blood  Gram Stain (22 Jun 2023 15:30):    Growth in aerobic bottle: Gram Positive Cocci in Clusters    Growth in anaerobic bottle: Gram Positive Cocci in Clusters  Final Report (24 Jun 2023 11:52):    Growth in aerobic and anaerobic bottles: Staphylococcus aureus  Organism: Staphylococcus aureus (24 Jun 2023 11:52)  Organism: Staphylococcus aureus (24 Jun 2023 11:52)    Culture - Blood (collected 21 Jun 2023 15:36)  Source: .Blood Blood  Gram Stain (22 Jun 2023 12:23):    Growth in aerobic bottle: Gram Positive Cocci in Clusters and Gram    Positive Cocci in Pairs and Chains    Growth in anaerobic bottle: Gram Positive Cocci in Pairs and Chains  Final Report (24 Jun 2023 12:13):    Growth in aerobic and anaerobic bottles: Staphylococcus aureus    Growth in aerobic and anaerobic bottles: Gram Positive Cocci in Pairs and    Chains Most closely resembling Alpha hemolytic strep "Susceptibilities    not performed"    ***Blood Panel PCR results on this specimen are available    approximately 3 hours after the Gram stain result.***    Gram stain, PCR, and/or culture results may not always    correspond due to difference in methodologies.    ************************************************************    This PCR assay was performed by multiplex PCR. This    Assay tests for 66 bacterial and resistance gene targets.    Please refer to the [facility] test directory    at [email or web address] for details.  Organism: Blood Culture PCR  Staphylococcus aureus (24 Jun 2023 12:13)  Organism: Staphylococcus aureus (24 Jun 2023 12:13)  Organism: Blood Culture PCR (24 Jun 2023 12:13)      albuterol    90 MICROgram(s) HFA Inhaler 2 Puff(s) Inhalation every 6 hours PRN  budesonide  80 MICROgram(s)/formoterol 4.5 MICROgram(s) Inhaler 2 Puff(s) Inhalation two times a day  ceFAZolin   IVPB 2000 milliGRAM(s) IV Intermittent every 8 hours  dextrose 50% Injectable 25 Gram(s) IV Push once  dextrose Oral Gel 15 Gram(s) Oral once PRN  glucagon  Injectable 1 milliGRAM(s) IntraMuscular once  insulin glargine Injectable (LANTUS) 10 Unit(s) SubCutaneous at bedtime  insulin lispro (ADMELOG) corrective regimen sliding scale   SubCutaneous three times a day before meals  insulin lispro Injectable (ADMELOG) 3 Unit(s) SubCutaneous three times a day before meals  lacosamide 100 milliGRAM(s) Oral two times a day  levETIRAcetam 750 milliGRAM(s) Oral two times a day  metroNIDAZOLE  IVPB      metroNIDAZOLE  IVPB 500 milliGRAM(s) IV Intermittent every 8 hours  pantoprazole    Tablet 40 milliGRAM(s) Oral before breakfast  prednisoLONE acetate 1% Suspension 1 Drop(s) Right EYE every 6 hours      72 yo M w  PMH of DM  HLD  HTN  bipolar disorder  GERD  OM  with toe amputations was in by his podiatrist due to non healing ulceration to right foot.     1. RIght infected diabetic foot ulcer complicated by an abscess s/p drainage and debridement by podiatry   - Admit to medicine           - Right foot x-ray: Possible 1.1 cm gas containing collection seen abutting the first metatarsal transection (new compared to XR in 5/23)  - Was on vanco, cefazolin and metronidazole that I will change to Zosyn to cover MSSA and pseudomona   - Blood cx:MSSA/Strep. Repeat blood cx:pending         - Wound cx:MSSA/Pseudomona   - Echocardio:pending   - ESR:46   - Arterial duplex: WNL   - MRI right foot :pending   - podiatry: Excisional debridement of soft tissue done today (6/23)  - Daily bcx until -ve    2.Elevated Alk phos and ALP  - fu GGT  - check vit D     3. DMT2  - Started on 10u Lantus and 3u Lispro premeal w/ ISS  - monitor FS, adjust PRN    4. Asthma  - not in exacerbation  - c/w inhalers    5. Seizure Disorder  - c/w home Keppra 750mg BID  - c/w home lacosamide 100mg BID    6. HO Bipolar Depression  - not on meds  - not in manic or depression state    7. GERD  - therapeutic exchange for home omeprazole    DVT ppx: lovenox  GI ppx: PPI  Diet: DASH/TLC/CC  Activity: AAT  Code Status: Full Code    To Do:  -F/u on daily bcx  -F/u on echo     JOSÉ MANUEL PORTER  73y  [facility]-N 3A 007 B      Patient is a 73y old  Male who presents with a chief complaint of     INTERVAL HPI/OVERNIGHT EVENTS:  Patient feels well.   he's reporting pain in the right foot likely due to infection   no other complains noted         FAMILY HISTORY:  Family history of lung cancer (Mother)    Family history of ischemic heart disease (IHD) (Father)      T(C): 36.2 (06-24-23 @ 06:16), Max: 36.7 (06-23-23 @ 14:30)  HR: 68 (06-24-23 @ 06:16) (68 - 75)  BP: 109/53 (06-24-23 @ 06:16) (109/53 - 152/78)  RR: 18 (06-24-23 @ 06:16) (16 - 18)  SpO2: 99% (06-24-23 @ 06:16) (97% - 99%)  Wt(kg): --Vital Signs Last 24 Hrs  T(C): 36.2 (24 Jun 2023 06:16), Max: 36.7 (23 Jun 2023 14:30)  T(F): 97.1 (24 Jun 2023 06:16), Max: 98 (23 Jun 2023 14:30)  HR: 68 (24 Jun 2023 06:16) (68 - 75)  BP: 109/53 (24 Jun 2023 06:16) (109/53 - 152/78)  BP(mean): --  RR: 18 (24 Jun 2023 06:16) (16 - 18)  SpO2: 99% (24 Jun 2023 06:16) (97% - 99%)        PHYSICAL EXAM:  GENERAL: NAD, well-groomed, well-developed  NERVOUS SYSTEM:  Alert & Oriented X3, Good concentration; Motor Strength 5/5 B/L upper and lower extremities; DTRs 2+ intact and symmetric  PULM: Clear to auscultation bilaterally  CARDIAC: Regular rate and rhythm; No murmurs, rubs, or gallops  GI: Soft, Nontender, Nondistended; Bowel sounds present  EXTREMITIES: no clear signs of arterial insuff, left foot toe amputation noted, right foot covered     Consultant(s) Notes Reviewed:  [x ] YES  [ ] NO  Care Discussed with Consultants/Other Providers [ x] YES  [ ] NO    LABS:                            11.2   6.49  )-----------( 268      ( 24 Jun 2023 07:10 )             34.4   06-24    139  |  104  |  14  ----------------------------<  249<H>  4.7   |  25  |  1.0    Ca    8.4      24 Jun 2023 07:10  Mg     2.0     06-24    TPro  5.7<L>  /  Alb  3.0<L>  /  TBili  0.2  /  DBili  x   /  AST  15  /  ALT  40  /  AlkPhos  148<H>  06-24            Culture - Abscess with Gram Stain (collected 21 Jun 2023 19:50)  Source: .Abscess foot  Gram Stain (22 Jun 2023 04:54):    Moderate polymorphonuclear leukocytes seen per low power field    Moderate Gram positive cocci in pairs seen per oil power field    Rare Gram Negative Rods seen per oil power field  Preliminary Report (23 Jun 2023 15:58):    Numerous Pseudomonas aeruginosa    Numerous Staphylococcus aureus    Few Gram Negative Rods #2    Moderate Corynebacterium striatum group "Susceptibilities not performed"  Organism: Pseudomonas aeruginosa  Staphylococcus aureus (24 Jun 2023 10:07)  Organism: Staphylococcus aureus (24 Jun 2023 10:07)  Organism: Pseudomonas aeruginosa (24 Jun 2023 10:06)    Culture - Blood (collected 21 Jun 2023 15:36)  Source: .Blood Blood  Gram Stain (22 Jun 2023 15:30):    Growth in aerobic bottle: Gram Positive Cocci in Clusters    Growth in anaerobic bottle: Gram Positive Cocci in Clusters  Final Report (24 Jun 2023 11:52):    Growth in aerobic and anaerobic bottles: Staphylococcus aureus  Organism: Staphylococcus aureus (24 Jun 2023 11:52)  Organism: Staphylococcus aureus (24 Jun 2023 11:52)    Culture - Blood (collected 21 Jun 2023 15:36)  Source: .Blood Blood  Gram Stain (22 Jun 2023 12:23):    Growth in aerobic bottle: Gram Positive Cocci in Clusters and Gram    Positive Cocci in Pairs and Chains    Growth in anaerobic bottle: Gram Positive Cocci in Pairs and Chains  Final Report (24 Jun 2023 12:13):    Growth in aerobic and anaerobic bottles: Staphylococcus aureus    Growth in aerobic and anaerobic bottles: Gram Positive Cocci in Pairs and    Chains Most closely resembling Alpha hemolytic strep "Susceptibilities    not performed"    ***Blood Panel PCR results on this specimen are available    approximately 3 hours after the Gram stain result.***    Gram stain, PCR, and/or culture results may not always    correspond due to difference in methodologies.    ************************************************************    This PCR assay was performed by multiplex PCR. This    Assay tests for 66 bacterial and resistance gene targets.    Please refer to the [facility] test directory    at [email or web address] for details.  Organism: Blood Culture PCR  Staphylococcus aureus (24 Jun 2023 12:13)  Organism: Staphylococcus aureus (24 Jun 2023 12:13)  Organism: Blood Culture PCR (24 Jun 2023 12:13)      albuterol    90 MICROgram(s) HFA Inhaler 2 Puff(s) Inhalation every 6 hours PRN  budesonide  80 MICROgram(s)/formoterol 4.5 MICROgram(s) Inhaler 2 Puff(s) Inhalation two times a day  ceFAZolin   IVPB 2000 milliGRAM(s) IV Intermittent every 8 hours  dextrose 50% Injectable 25 Gram(s) IV Push once  dextrose Oral Gel 15 Gram(s) Oral once PRN  glucagon  Injectable 1 milliGRAM(s) IntraMuscular once  insulin glargine Injectable (LANTUS) 10 Unit(s) SubCutaneous at bedtime  insulin lispro (ADMELOG) corrective regimen sliding scale   SubCutaneous three times a day before meals  insulin lispro Injectable (ADMELOG) 3 Unit(s) SubCutaneous three times a day before meals  lacosamide 100 milliGRAM(s) Oral two times a day  levETIRAcetam 750 milliGRAM(s) Oral two times a day  metroNIDAZOLE  IVPB      metroNIDAZOLE  IVPB 500 milliGRAM(s) IV Intermittent every 8 hours  pantoprazole    Tablet 40 milliGRAM(s) Oral before breakfast  prednisoLONE acetate 1% Suspension 1 Drop(s) Right EYE every 6 hours      72 yo M w  PMH of DM  HLD  HTN  bipolar disorder  GERD  OM  with toe amputations was in by his podiatrist due to non healing ulceration to right foot.     1. RIght infected diabetic foot ulcer complicated by an abscess s/p drainage and debridement by podiatry   - Admit to medicine           - Right foot x-ray: Possible 1.1 cm gas containing collection seen abutting the first metatarsal transection (new compared to XR in 5/23)  - Was on vanco, cefazolin and metronidazole that I will change to Zosyn to cover MSSA and pseudomona   - Blood cx:MSSA/Strep. Repeat blood cx:pending         - Wound cx:MSSA/Pseudomona   - Echocardio:pending   - ESR:46   - Arterial duplex: WNL   - MRI right foot (cancelled)   - podiatry: Excisional debridement of soft tissue done today (6/23)  - Daily bcx until -ve    2.Elevated Alk phos and ALP  - fu GGT  - check vit D     3. DMT2  - Started on 10u Lantus and 3u Lispro premeal w/ ISS  - monitor FS, adjust PRN    4. Asthma  - not in exacerbation  - c/w inhalers    5. Seizure Disorder  - c/w home Keppra 750mg BID  - c/w home lacosamide 100mg BID    6. HO Bipolar Depression  - not on meds  - not in manic or depression state    7. GERD  - therapeutic exchange for home omeprazole    DVT ppx: lovenox  GI ppx: PPI  Diet: DASH/TLC/CC  Activity: AAT  Code Status: Full Code    To Do:  -F/u on daily bcx  -F/u on echo

## 2023-06-24 NOTE — PROGRESS NOTE ADULT - SUBJECTIVE AND OBJECTIVE BOX
24H events:    Patient is a 73y old Male who presents with a chief complaint of   Primary diagnosis of Diabetic ulcer of right foot    Today is hospital day 3d. This morning patient was seen and examined at bedside, resting comfortably in bed.    Underwent debridement in the OR yesterday.    Code Status: Full code    PAST MEDICAL & SURGICAL HISTORY  DM (diabetes mellitus)  Type 2, 12/27/18 hgb aic  11.2    HTN (hypertension)    Dyslipidemia    Diabetic foot ulcer    Depression    GERD (gastroesophageal reflux disease)    Neuropathy, diabetic    Toe amputation status, right  (2008)    History of amputation of toe  LT -partial 1st toe        ALLERGIES:  No Known Allergies    MEDICATIONS:  STANDING MEDICATIONS  budesonide  80 MICROgram(s)/formoterol 4.5 MICROgram(s) Inhaler 2 Puff(s) Inhalation two times a day  ceFAZolin   IVPB 2000 milliGRAM(s) IV Intermittent every 8 hours  dextrose 50% Injectable 25 Gram(s) IV Push once  glucagon  Injectable 1 milliGRAM(s) IntraMuscular once  insulin glargine Injectable (LANTUS) 10 Unit(s) SubCutaneous at bedtime  insulin lispro (ADMELOG) corrective regimen sliding scale   SubCutaneous three times a day before meals  insulin lispro Injectable (ADMELOG) 3 Unit(s) SubCutaneous three times a day before meals  lacosamide 100 milliGRAM(s) Oral two times a day  levETIRAcetam 750 milliGRAM(s) Oral two times a day  metroNIDAZOLE  IVPB      metroNIDAZOLE  IVPB 500 milliGRAM(s) IV Intermittent every 8 hours  pantoprazole    Tablet 40 milliGRAM(s) Oral before breakfast  prednisoLONE acetate 1% Suspension 1 Drop(s) Right EYE every 6 hours    PRN MEDICATIONS  albuterol    90 MICROgram(s) HFA Inhaler 2 Puff(s) Inhalation every 6 hours PRN  dextrose Oral Gel 15 Gram(s) Oral once PRN    VITALS:   Vital Signs Last 24 Hrs  T(C): 36.2 (24 Jun 2023 06:16), Max: 36.7 (23 Jun 2023 14:30)  T(F): 97.1 (24 Jun 2023 06:16), Max: 98 (23 Jun 2023 14:30)  HR: 68 (24 Jun 2023 06:16) (68 - 75)  BP: 109/53 (24 Jun 2023 06:16) (109/53 - 152/78)  BP(mean): --  RR: 18 (24 Jun 2023 06:16) (16 - 18)  SpO2: 99% (24 Jun 2023 06:16) (97% - 99%)      PHYSICAL EXAM:  Gen: NAD  HEENT: PERRL, EOMI, mouth clr, nose clr  Neck: no nodes, no JVD, thyroid nl  lungs: clr  hrt: s1 s2 rrr no murmur  abd: soft, NT/ND, no HS megaly  ext: DFU on the Rt foot  neuro: aa ox3, cn intact, can move all 4 ex    LABS:                        11.1   CBC:            11.2   6.49  )-----------( 268      ( 06-24-23 @ 07:10 )             34.4         Chem:         ( 06-24-23 @ 07:10 )    139  |  104  |  14  ----------------------------<  249<H>  4.7   |  25  |  1.0        Liver Functions: ( 06-24-23 @ 07:10 )  Alb: 3.0 g/dL / Pro: 5.7 g/dL / ALK PHOS: 148 U/L / ALT: 40 U/L / AST: 15 U/L / GGT: x            Sedimentation Rate, Erythrocyte: 46 mm/Hr *H* (06-22-23 @ 21:06)      Culture - Abscess with Gram Stain (collected 21 Jun 2023 19:50)  Source: .Abscess foot  Gram Stain (22 Jun 2023 04:54):    Moderate polymorphonuclear leukocytes seen per low power field    Moderate Gram positive cocci in pairs seen per oil power field    Rare Gram Negative Rods seen per oil power field  Preliminary Report (23 Jun 2023 15:58):    Numerous Pseudomonas aeruginosa    Numerous Staphylococcus aureus    Few Gram Negative Rods #2    Moderate Corynebacterium striatum group "Susceptibilities not performed"    Culture - Blood (collected 21 Jun 2023 15:36)  Source: .Blood Blood  Gram Stain (22 Jun 2023 15:30):    Growth in aerobic bottle: Gram Positive Cocci in Clusters    Growth in anaerobic bottle: Gram Positive Cocci in Clusters  Preliminary Report (23 Jun 2023 15:19):    Growth in aerobic and anaerobic bottles: Staphylococcus aureus    Susceptibility to follow.    Culture - Blood (collected 21 Jun 2023 15:36)  Source: .Blood Blood  Gram Stain (22 Jun 2023 12:23):    Growth in aerobic bottle: Gram Positive Cocci in Clusters and Gram    Positive Cocci in Pairs and Chains    Growth in anaerobic bottle: Gram Positive Cocci in Pairs and Chains  Preliminary Report (23 Jun 2023 12:19):    Growth in aerobic bottle: Staphylococcus aureus Susceptibility to follow.    Growth in anaerobic bottle: Gram Positive Cocci in Pairs and Chains Most    closely resembling Alpha hemolytic strep "Susceptibilities not performed"    Growth in aerobic bottle: Gram Positive Cocci in Clusters and Gram    Positive Cocci in Pairs and Chains    ***Blood Panel PCR results on this specimen are available    approximately 3 hours after the Gram stain result.***    Gram stain, PCR, and/or culture results may not always    correspond due to difference in methodologies.    ************************************************************    This PCR assay was performed by multiplex PCR. This    Assay tests for 66 bacterial and resistance gene targets.    Please refer to the [facility] test directory    at [email or web address] for details.  Organism: Blood Culture PCR (22 Jun 2023 10:50)  Organism: Blood Culture PCR (22 Jun 2023 10:50)        RADIOLOGY    6/21 XR right foot:   The patient is again status post transmetatarsal amputation of all rays. There is evidence of skin ulceration at the amputation margin around the first metatarsal stump. There is no definite deep/tracking soft tissue gas. Osteomyelitis of the first metatarsal stump is difficult to exclude. A surgical staple is again noted between the first and second metatarsal stumps.

## 2023-06-24 NOTE — PROGRESS NOTE ADULT - SUBJECTIVE AND OBJECTIVE BOX
Podiatry Progress Note    Subjective:  JOSÉ MANUEL PORTER is a  73y Male.   Seen bedside.   Patient is a 73y old  Male who presents with a chief complaint of right foot DFU    Past Medical History and Surgical History  PAST MEDICAL & SURGICAL HISTORY:  DM (diabetes mellitus)  Type 2, 12/27/18 hgb aic  11.2      HTN (hypertension)      Dyslipidemia      Diabetic foot ulcer      Depression      GERD (gastroesophageal reflux disease)      Neuropathy, diabetic      Toe amputation status, right  (2008)      History of amputation of toe  LT -partial 1st toe           Objective:  Vital Signs Last 24 Hrs  T(C): 36.2 (24 Jun 2023 06:16), Max: 36.7 (23 Jun 2023 14:30)  T(F): 97.1 (24 Jun 2023 06:16), Max: 98 (23 Jun 2023 14:30)  HR: 68 (24 Jun 2023 06:16) (68 - 75)  BP: 109/53 (24 Jun 2023 06:16) (109/53 - 152/78)  BP(mean): --  RR: 18 (24 Jun 2023 06:16) (16 - 18)  SpO2: 99% (24 Jun 2023 06:16) (97% - 99%)                            11.2   6.49  )-----------( 268      ( 24 Jun 2023 07:10 )             34.4                 06-24    139  |  104  |  14  ----------------------------<  249<H>  4.7   |  25  |  1.0    Ca    8.4      24 Jun 2023 07:10  Mg     2.0     06-24    TPro  5.7<L>  /  Alb  3.0<L>  /  TBili  0.2  /  DBili  x   /  AST  15  /  ALT  40  /  AlkPhos  148<H>  06-24        Physical Exam - Right Lower Extremity Focused:   Derm: Plantar right foot ulcer with granular tissue present, deep to bone, mild macerated borders, mild malodor noted, Moderate purulent drainage noted; with periwound erythema.   Vascular: DP and PT Pulses Diminished; Foot is Warm to Warm to the touch; Capillary Refill Time < 3 Seconds;    Neuro: Protective Sensation Diminished / Moderately Neuropathic   MSK: minimal Pain On Palpation at Wound Site     Assessment:  Right DFU; probes to bone  Cellulitic Right Lower Extremity     Plan:  Chart reviewed and Patient evaluated. All Questions and Concerns Addressed and Answered  XR Imaging R Foot; results reviewed  Local Wound Care; Wound Flushed w/ NS; Wound Packed w/ iodoform / DSD / Kerlix / ACE  Continue with local wound care q24h as stated above; patient has established VNS services   Weight Bearing Status; WBAT RLE heel touch  Patient is stable from podiatry standpoint for discharge  Follow up with Dr. Bhatia @ [address] [place] [address] 1 week post discharge  Discussed Plan w/ Dr. Bhatia    Podiatry          Podiatry Progress Note    Subjective:  JOSÉ MANUEL PORTER is a  73y Male.   Seen bedside.   Patient is a 73y old  Male who presents with a chief complaint of right foot DFU    Past Medical History and Surgical History  PAST MEDICAL & SURGICAL HISTORY:  DM (diabetes mellitus)  Type 2, 12/27/18 hgb aic  11.2      HTN (hypertension)      Dyslipidemia      Diabetic foot ulcer      Depression      GERD (gastroesophageal reflux disease)      Neuropathy, diabetic      Toe amputation status, right  (2008)      History of amputation of toe  LT -partial 1st toe           Objective:  Vital Signs Last 24 Hrs  T(C): 36.2 (24 Jun 2023 06:16), Max: 36.7 (23 Jun 2023 14:30)  T(F): 97.1 (24 Jun 2023 06:16), Max: 98 (23 Jun 2023 14:30)  HR: 68 (24 Jun 2023 06:16) (68 - 75)  BP: 109/53 (24 Jun 2023 06:16) (109/53 - 152/78)  BP(mean): --  RR: 18 (24 Jun 2023 06:16) (16 - 18)  SpO2: 99% (24 Jun 2023 06:16) (97% - 99%)                            11.2   6.49  )-----------( 268      ( 24 Jun 2023 07:10 )             34.4                 06-24    139  |  104  |  14  ----------------------------<  249<H>  4.7   |  25  |  1.0    Ca    8.4      24 Jun 2023 07:10  Mg     2.0     06-24    TPro  5.7<L>  /  Alb  3.0<L>  /  TBili  0.2  /  DBili  x   /  AST  15  /  ALT  40  /  AlkPhos  148<H>  06-24        Physical Exam - Right Lower Extremity Focused:   Derm: Plantar right foot ulcer with granular tissue present, deep to bone, mild macerated borders, no malodor or purulence noted. Sutures intact to plantar foot  Vascular: DP and PT Pulses Diminished; Foot is Warm to Warm to the touch; Capillary Refill Time < 3 Seconds;    Neuro: Protective Sensation Diminished / Moderately Neuropathic   MSK: minimal Pain On Palpation at Wound Site     Assessment:  Right DFU; probes to bone  Cellulitic Right Lower Extremity     Plan:  Chart reviewed and Patient evaluated. All Questions and Concerns Addressed and Answered  XR Imaging R Foot; results reviewed  Local Wound Care; Wound Flushed w/ NS; Wound Packed w/ iodoform / DSD / Kerlix / ACE  Continue with local wound care q24h as stated above; patient has established VNS services   Weight Bearing Status; WBAT RLE heel touch  Patient is stable from podiatry standpoint for discharge  Follow up with Dr. Bhatia @ [address] [place] [address] 1 week post discharge  Discussed Plan w/ Dr. Bhatia    Podiatry

## 2023-06-24 NOTE — PROGRESS NOTE ADULT - ASSESSMENT
72 yo M w  PMH of DM  HLD  HTN  bipolar disorder  GERD  OM  with toe amputations was in by his podiatrist due to non healing ulceration to right foot.     DFU R foot  OM?  XR R foot :   Possible 1.1 cm gas containing collection seen abutting the first metatarsal transection (new compared to XR in 5/23)   No new cortical destruction to suggest osteomyelitis  art duplex 5/23: wnl  - vanc 1250 q12  +  cefepime 2 q8  +  flagyl 500 q8  - fu BCx    WCx  - fu ESR  CRP  - fu MR R foot  - podiatry: Excisional debridement of soft tissue done (6/23)  - Wound and blood cultures show MSSA > Echo ordered to r/o IE  - Daily bcx until -ve    ++alk phos and ALP  possible from bone path (OM)  - fu GGT    DMT2  - Started on 10u Lantus and 3u Lispro premeal w/ ISS  - monitor FS, adjust PRN    Asthma  - not in exacerbation  - c/w inhalers    Seizure Disorder  - c/w home Keppra 750mg BID  - c/w home lacosamide 100mg BID    HO Bipolar Depression  - not on meds  - not in manic or depression state    GERD  - therapeutic exchange for home omeprazole    DVT ppx: lovenox  GI ppx: PPI  Diet: DASH/TLC/CC  Activity: AAT  Code Status: Full Code    To Do:  -F/u on daily bcx  -F/u on echo

## 2023-06-25 LAB
-  AMIKACIN: SIGNIFICANT CHANGE UP
-  AMOXICILLIN/CLAVULANIC ACID: SIGNIFICANT CHANGE UP
-  AMPICILLIN/SULBACTAM: SIGNIFICANT CHANGE UP
-  AMPICILLIN: SIGNIFICANT CHANGE UP
-  AZTREONAM: SIGNIFICANT CHANGE UP
-  CEFAZOLIN: SIGNIFICANT CHANGE UP
-  CEFEPIME: SIGNIFICANT CHANGE UP
-  CEFOXITIN: SIGNIFICANT CHANGE UP
-  CEFTRIAXONE: SIGNIFICANT CHANGE UP
-  CIPROFLOXACIN: SIGNIFICANT CHANGE UP
-  ERTAPENEM: SIGNIFICANT CHANGE UP
-  GENTAMICIN: SIGNIFICANT CHANGE UP
-  IMIPENEM: SIGNIFICANT CHANGE UP
-  LEVOFLOXACIN: SIGNIFICANT CHANGE UP
-  MEROPENEM: SIGNIFICANT CHANGE UP
-  PIPERACILLIN/TAZOBACTAM: SIGNIFICANT CHANGE UP
-  TOBRAMYCIN: SIGNIFICANT CHANGE UP
-  TRIMETHOPRIM/SULFAMETHOXAZOLE: SIGNIFICANT CHANGE UP
24R-OH-CALCIDIOL SERPL-MCNC: 20 NG/ML — LOW (ref 30–80)
ALBUMIN SERPL ELPH-MCNC: 3.3 G/DL — LOW (ref 3.5–5.2)
ALP SERPL-CCNC: 146 U/L — HIGH (ref 30–115)
ALT FLD-CCNC: 34 U/L — SIGNIFICANT CHANGE UP (ref 0–41)
ANION GAP SERPL CALC-SCNC: 8 MMOL/L — SIGNIFICANT CHANGE UP (ref 7–14)
AST SERPL-CCNC: 29 U/L — SIGNIFICANT CHANGE UP (ref 0–41)
BASOPHILS # BLD AUTO: 0.03 K/UL — SIGNIFICANT CHANGE UP (ref 0–0.2)
BASOPHILS NFR BLD AUTO: 0.5 % — SIGNIFICANT CHANGE UP (ref 0–1)
BILIRUB SERPL-MCNC: 0.3 MG/DL — SIGNIFICANT CHANGE UP (ref 0.2–1.2)
BUN SERPL-MCNC: 14 MG/DL — SIGNIFICANT CHANGE UP (ref 10–20)
CALCIUM SERPL-MCNC: 8.4 MG/DL — SIGNIFICANT CHANGE UP (ref 8.4–10.5)
CHLORIDE SERPL-SCNC: 106 MMOL/L — SIGNIFICANT CHANGE UP (ref 98–110)
CO2 SERPL-SCNC: 28 MMOL/L — SIGNIFICANT CHANGE UP (ref 17–32)
CREAT SERPL-MCNC: 1 MG/DL — SIGNIFICANT CHANGE UP (ref 0.7–1.5)
EGFR: 78 ML/MIN/1.73M2 — SIGNIFICANT CHANGE UP
EOSINOPHIL # BLD AUTO: 0.08 K/UL — SIGNIFICANT CHANGE UP (ref 0–0.7)
EOSINOPHIL NFR BLD AUTO: 1.4 % — SIGNIFICANT CHANGE UP (ref 0–8)
GGT SERPL-CCNC: 111 U/L — HIGH (ref 1–40)
GLUCOSE BLDC GLUCOMTR-MCNC: 243 MG/DL — HIGH (ref 70–99)
GLUCOSE SERPL-MCNC: 273 MG/DL — HIGH (ref 70–99)
HCT VFR BLD CALC: 36.4 % — LOW (ref 42–52)
HGB BLD-MCNC: 11.8 G/DL — LOW (ref 14–18)
IMM GRANULOCYTES NFR BLD AUTO: 1.8 % — HIGH (ref 0.1–0.3)
LYMPHOCYTES # BLD AUTO: 1.18 K/UL — LOW (ref 1.2–3.4)
LYMPHOCYTES # BLD AUTO: 20.8 % — SIGNIFICANT CHANGE UP (ref 20.5–51.1)
MAGNESIUM SERPL-MCNC: 2 MG/DL — SIGNIFICANT CHANGE UP (ref 1.8–2.4)
MCHC RBC-ENTMCNC: 29.6 PG — SIGNIFICANT CHANGE UP (ref 27–31)
MCHC RBC-ENTMCNC: 32.4 G/DL — SIGNIFICANT CHANGE UP (ref 32–37)
MCV RBC AUTO: 91.2 FL — SIGNIFICANT CHANGE UP (ref 80–94)
METHOD TYPE: SIGNIFICANT CHANGE UP
MONOCYTES # BLD AUTO: 0.49 K/UL — SIGNIFICANT CHANGE UP (ref 0.1–0.6)
MONOCYTES NFR BLD AUTO: 8.7 % — SIGNIFICANT CHANGE UP (ref 1.7–9.3)
NEUTROPHILS # BLD AUTO: 3.78 K/UL — SIGNIFICANT CHANGE UP (ref 1.4–6.5)
NEUTROPHILS NFR BLD AUTO: 66.8 % — SIGNIFICANT CHANGE UP (ref 42.2–75.2)
NRBC # BLD: 0 /100 WBCS — SIGNIFICANT CHANGE UP (ref 0–0)
PLATELET # BLD AUTO: 313 K/UL — SIGNIFICANT CHANGE UP (ref 130–400)
PMV BLD: 10.2 FL — SIGNIFICANT CHANGE UP (ref 7.4–10.4)
POTASSIUM SERPL-MCNC: 5 MMOL/L — SIGNIFICANT CHANGE UP (ref 3.5–5)
POTASSIUM SERPL-SCNC: 5 MMOL/L — SIGNIFICANT CHANGE UP (ref 3.5–5)
PROT SERPL-MCNC: 6.2 G/DL — SIGNIFICANT CHANGE UP (ref 6–8)
RBC # BLD: 3.99 M/UL — LOW (ref 4.7–6.1)
RBC # FLD: 13.2 % — SIGNIFICANT CHANGE UP (ref 11.5–14.5)
SODIUM SERPL-SCNC: 142 MMOL/L — SIGNIFICANT CHANGE UP (ref 135–146)
WBC # BLD: 5.66 K/UL — SIGNIFICANT CHANGE UP (ref 4.8–10.8)
WBC # FLD AUTO: 5.66 K/UL — SIGNIFICANT CHANGE UP (ref 4.8–10.8)

## 2023-06-25 PROCEDURE — 99232 SBSQ HOSP IP/OBS MODERATE 35: CPT

## 2023-06-25 RX ORDER — LISINOPRIL 2.5 MG/1
10 TABLET ORAL DAILY
Refills: 0 | Status: DISCONTINUED | OUTPATIENT
Start: 2023-06-25 | End: 2023-06-25

## 2023-06-25 RX ADMIN — Medication 1 DROP(S): at 23:23

## 2023-06-25 RX ADMIN — LEVETIRACETAM 750 MILLIGRAM(S): 250 TABLET, FILM COATED ORAL at 05:49

## 2023-06-25 RX ADMIN — PIPERACILLIN AND TAZOBACTAM 25 GRAM(S): 4; .5 INJECTION, POWDER, LYOPHILIZED, FOR SOLUTION INTRAVENOUS at 05:46

## 2023-06-25 RX ADMIN — Medication 3: at 12:22

## 2023-06-25 RX ADMIN — Medication 1 DROP(S): at 11:46

## 2023-06-25 RX ADMIN — Medication 1 DROP(S): at 05:48

## 2023-06-25 RX ADMIN — Medication 2: at 08:27

## 2023-06-25 RX ADMIN — INSULIN GLARGINE 10 UNIT(S): 100 INJECTION, SOLUTION SUBCUTANEOUS at 21:56

## 2023-06-25 RX ADMIN — PIPERACILLIN AND TAZOBACTAM 25 GRAM(S): 4; .5 INJECTION, POWDER, LYOPHILIZED, FOR SOLUTION INTRAVENOUS at 13:54

## 2023-06-25 RX ADMIN — Medication 1: at 17:40

## 2023-06-25 RX ADMIN — LACOSAMIDE 100 MILLIGRAM(S): 50 TABLET ORAL at 05:50

## 2023-06-25 RX ADMIN — LACOSAMIDE 100 MILLIGRAM(S): 50 TABLET ORAL at 17:44

## 2023-06-25 RX ADMIN — Medication 3 UNIT(S): at 17:40

## 2023-06-25 RX ADMIN — LEVETIRACETAM 750 MILLIGRAM(S): 250 TABLET, FILM COATED ORAL at 17:41

## 2023-06-25 RX ADMIN — Medication 3 UNIT(S): at 12:21

## 2023-06-25 RX ADMIN — Medication 1 DROP(S): at 17:41

## 2023-06-25 RX ADMIN — Medication 3 UNIT(S): at 08:26

## 2023-06-25 RX ADMIN — PANTOPRAZOLE SODIUM 40 MILLIGRAM(S): 20 TABLET, DELAYED RELEASE ORAL at 05:49

## 2023-06-25 RX ADMIN — PIPERACILLIN AND TAZOBACTAM 25 GRAM(S): 4; .5 INJECTION, POWDER, LYOPHILIZED, FOR SOLUTION INTRAVENOUS at 00:18

## 2023-06-25 RX ADMIN — Medication 1 DROP(S): at 00:18

## 2023-06-25 RX ADMIN — PIPERACILLIN AND TAZOBACTAM 25 GRAM(S): 4; .5 INJECTION, POWDER, LYOPHILIZED, FOR SOLUTION INTRAVENOUS at 21:56

## 2023-06-25 NOTE — PROGRESS NOTE ADULT - SUBJECTIVE AND OBJECTIVE BOX
SUBJECTIVE:    Patient is a 73y old Male who presents with a chief complaint of pain rt foot.  Currently admitted to medicine with the primary diagnosis of Diabetic ulcer of right foot    Today is hospital day 4d. This morning he is resting comfortably in bed and reports no new issues or overnight events.     PAST MEDICAL & SURGICAL HISTORY  DM (diabetes mellitus)  Type 2, 12/27/18 hgb aic  11.2    HTN (hypertension)    Dyslipidemia    Diabetic foot ulcer    Depression    GERD (gastroesophageal reflux disease)    Neuropathy, diabetic    Toe amputation status, right  (2008)    History of amputation of toe  LT -partial 1st toe        ALLERGIES:  No Known Allergies    MEDICATIONS:  STANDING MEDICATIONS  budesonide  80 MICROgram(s)/formoterol 4.5 MICROgram(s) Inhaler 2 Puff(s) Inhalation two times a day  dextrose 50% Injectable 25 Gram(s) IV Push once  glucagon  Injectable 1 milliGRAM(s) IntraMuscular once  insulin glargine Injectable (LANTUS) 10 Unit(s) SubCutaneous at bedtime  insulin lispro (ADMELOG) corrective regimen sliding scale   SubCutaneous three times a day before meals  insulin lispro Injectable (ADMELOG) 3 Unit(s) SubCutaneous three times a day before meals  lacosamide 100 milliGRAM(s) Oral two times a day  levETIRAcetam 750 milliGRAM(s) Oral two times a day  pantoprazole    Tablet 40 milliGRAM(s) Oral before breakfast  piperacillin/tazobactam IVPB.. 3.375 Gram(s) IV Intermittent every 8 hours  prednisoLONE acetate 1% Suspension 1 Drop(s) Right EYE every 6 hours    PRN MEDICATIONS  albuterol    90 MICROgram(s) HFA Inhaler 2 Puff(s) Inhalation every 6 hours PRN  dextrose Oral Gel 15 Gram(s) Oral once PRN    VITALS:   T(F): 96.7  HR: 68  BP: 133/67  RR: 18  SpO2: 98%    LABS:                        11.2   6.49  )-----------( 268      ( 24 Jun 2023 07:10 )             34.4     06-24    139  |  104  |  14  ----------------------------<  249<H>  4.7   |  25  |  1.0    Ca    8.4      24 Jun 2023 07:10  Mg     2.0     06-24    TPro  5.7<L>  /  Alb  3.0<L>  /  TBili  0.2  /  DBili  x   /  AST  15  /  ALT  40  /  AlkPhos  148<H>  06-24      Urinalysis Basic - ( 24 Jun 2023 07:10 )    Color: x / Appearance: x / SG: x / pH: x  Gluc: 249 mg/dL / Ketone: x  / Bili: x / Urobili: x   Blood: x / Protein: x / Nitrite: x   Leuk Esterase: x / RBC: x / WBC x   Sq Epi: x / Non Sq Epi: x / Bacteria: x      Culture - Acid Fast - Other w/Smear (collected 23 Jun 2023 14:02)  Source: .Other None        PHYSICAL EXAM:  GEN: No acute distress  LUNGS: Clear to auscultation bilaterally   HEART: Regular  ABD: Soft, non-tender, non-distended.  EXT: RLE foot dressing in situ, moves all ext  NEURO: AAOX3

## 2023-06-25 NOTE — PROGRESS NOTE ADULT - ASSESSMENT
74 yo M w  PMH of DM  HLD  HTN  bipolar disorder  GERD  OM  with toe amputations was in by his podiatrist due to non healing ulceration to right foot.     1. RIght infected diabetic foot ulcer complicated by an abscess s/p drainage and debridement by podiatry          - Right foot x-ray: Possible 1.1 cm gas containing collection seen abutting the first metatarsal transection (new compared to XR in 5/23)  - Zosyn to cover MSSA and pseudomonas  - Blood cx:MSSA/Strep. Repeat blood cx:pending         - Wound cx:MSSA/Pseudomona   - Echocardio:pending   - ESR:46   - Arterial duplex: WNL   - MRI right foot (cancelled)   - podiatry: Excisional debridement of soft tissue (6/23)  - Daily bcx until -ve    2.Elevated Alk phos and ALP  US RUQ     3. DMT2  - Started on 10u Lantus and 3u Lispro premeal w/ ISS  - monitor FS, adjust PRN    4. Asthma  - not in exacerbation  - c/w inhalers    5. Seizure Disorder  - c/w home Keppra 750mg BID  - c/w home lacosamide 100mg BID    6. HO Bipolar Depression  - not on meds  - not in manic or depression state    7. GERD  - therapeutic exchange for home omeprazole    DVT ppx: lovenox  GI ppx: PPI  Diet: DASH/TLC/CC  Activity: AAT  Code Status: Full Code    DIspo : active

## 2023-06-26 ENCOUNTER — TRANSCRIPTION ENCOUNTER (OUTPATIENT)
Age: 73
End: 2023-06-26

## 2023-06-26 LAB
-  AMIKACIN: SIGNIFICANT CHANGE UP
-  AMPICILLIN/SULBACTAM: SIGNIFICANT CHANGE UP
-  AZTREONAM: SIGNIFICANT CHANGE UP
-  CEFAZOLIN: SIGNIFICANT CHANGE UP
-  CEFEPIME: SIGNIFICANT CHANGE UP
-  CEFTAZIDIME: SIGNIFICANT CHANGE UP
-  CIPROFLOXACIN: SIGNIFICANT CHANGE UP
-  CLINDAMYCIN: SIGNIFICANT CHANGE UP
-  ERYTHROMYCIN: SIGNIFICANT CHANGE UP
-  GENTAMICIN: SIGNIFICANT CHANGE UP
-  GENTAMICIN: SIGNIFICANT CHANGE UP
-  IMIPENEM: SIGNIFICANT CHANGE UP
-  LEVOFLOXACIN: SIGNIFICANT CHANGE UP
-  MEROPENEM: SIGNIFICANT CHANGE UP
-  OXACILLIN: SIGNIFICANT CHANGE UP
-  PENICILLIN: SIGNIFICANT CHANGE UP
-  PIPERACILLIN/TAZOBACTAM: SIGNIFICANT CHANGE UP
-  RIFAMPIN: SIGNIFICANT CHANGE UP
-  TETRACYCLINE: SIGNIFICANT CHANGE UP
-  TOBRAMYCIN: SIGNIFICANT CHANGE UP
-  TRIMETHOPRIM/SULFAMETHOXAZOLE: SIGNIFICANT CHANGE UP
-  VANCOMYCIN: SIGNIFICANT CHANGE UP
ALBUMIN SERPL ELPH-MCNC: 3.1 G/DL — LOW (ref 3.5–5.2)
ALP SERPL-CCNC: 135 U/L — HIGH (ref 30–115)
ALT FLD-CCNC: 35 U/L — SIGNIFICANT CHANGE UP (ref 0–41)
ANION GAP SERPL CALC-SCNC: 10 MMOL/L — SIGNIFICANT CHANGE UP (ref 7–14)
AST SERPL-CCNC: 33 U/L — SIGNIFICANT CHANGE UP (ref 0–41)
BASOPHILS # BLD AUTO: 0.04 K/UL — SIGNIFICANT CHANGE UP (ref 0–0.2)
BASOPHILS NFR BLD AUTO: 0.7 % — SIGNIFICANT CHANGE UP (ref 0–1)
BILIRUB SERPL-MCNC: 0.3 MG/DL — SIGNIFICANT CHANGE UP (ref 0.2–1.2)
BUN SERPL-MCNC: 14 MG/DL — SIGNIFICANT CHANGE UP (ref 10–20)
CALCIUM SERPL-MCNC: 8.8 MG/DL — SIGNIFICANT CHANGE UP (ref 8.4–10.5)
CHLORIDE SERPL-SCNC: 105 MMOL/L — SIGNIFICANT CHANGE UP (ref 98–110)
CO2 SERPL-SCNC: 23 MMOL/L — SIGNIFICANT CHANGE UP (ref 17–32)
CREAT SERPL-MCNC: 1 MG/DL — SIGNIFICANT CHANGE UP (ref 0.7–1.5)
EGFR: 78 ML/MIN/1.73M2 — SIGNIFICANT CHANGE UP
EOSINOPHIL # BLD AUTO: 0.08 K/UL — SIGNIFICANT CHANGE UP (ref 0–0.7)
EOSINOPHIL NFR BLD AUTO: 1.4 % — SIGNIFICANT CHANGE UP (ref 0–8)
GLUCOSE BLDC GLUCOMTR-MCNC: 280 MG/DL — HIGH (ref 70–99)
GLUCOSE BLDC GLUCOMTR-MCNC: 283 MG/DL — HIGH (ref 70–99)
GLUCOSE BLDC GLUCOMTR-MCNC: 309 MG/DL — HIGH (ref 70–99)
GLUCOSE SERPL-MCNC: 266 MG/DL — HIGH (ref 70–99)
HCT VFR BLD CALC: 36.9 % — LOW (ref 42–52)
HGB BLD-MCNC: 11.7 G/DL — LOW (ref 14–18)
IMM GRANULOCYTES NFR BLD AUTO: 2.1 % — HIGH (ref 0.1–0.3)
LYMPHOCYTES # BLD AUTO: 1.2 K/UL — SIGNIFICANT CHANGE UP (ref 1.2–3.4)
LYMPHOCYTES # BLD AUTO: 20.5 % — SIGNIFICANT CHANGE UP (ref 20.5–51.1)
MAGNESIUM SERPL-MCNC: 2 MG/DL — SIGNIFICANT CHANGE UP (ref 1.8–2.4)
MCHC RBC-ENTMCNC: 29.1 PG — SIGNIFICANT CHANGE UP (ref 27–31)
MCHC RBC-ENTMCNC: 31.7 G/DL — LOW (ref 32–37)
MCV RBC AUTO: 91.8 FL — SIGNIFICANT CHANGE UP (ref 80–94)
METHOD TYPE: SIGNIFICANT CHANGE UP
METHOD TYPE: SIGNIFICANT CHANGE UP
MONOCYTES # BLD AUTO: 0.54 K/UL — SIGNIFICANT CHANGE UP (ref 0.1–0.6)
MONOCYTES NFR BLD AUTO: 9.2 % — SIGNIFICANT CHANGE UP (ref 1.7–9.3)
NEUTROPHILS # BLD AUTO: 3.87 K/UL — SIGNIFICANT CHANGE UP (ref 1.4–6.5)
NEUTROPHILS NFR BLD AUTO: 66.1 % — SIGNIFICANT CHANGE UP (ref 42.2–75.2)
NRBC # BLD: 0 /100 WBCS — SIGNIFICANT CHANGE UP (ref 0–0)
PLATELET # BLD AUTO: 291 K/UL — SIGNIFICANT CHANGE UP (ref 130–400)
PMV BLD: 10.2 FL — SIGNIFICANT CHANGE UP (ref 7.4–10.4)
POTASSIUM SERPL-MCNC: 5 MMOL/L — SIGNIFICANT CHANGE UP (ref 3.5–5)
POTASSIUM SERPL-SCNC: 5 MMOL/L — SIGNIFICANT CHANGE UP (ref 3.5–5)
PROT SERPL-MCNC: 6 G/DL — SIGNIFICANT CHANGE UP (ref 6–8)
RBC # BLD: 4.02 M/UL — LOW (ref 4.7–6.1)
RBC # FLD: 13 % — SIGNIFICANT CHANGE UP (ref 11.5–14.5)
SODIUM SERPL-SCNC: 138 MMOL/L — SIGNIFICANT CHANGE UP (ref 135–146)
WBC # BLD: 5.85 K/UL — SIGNIFICANT CHANGE UP (ref 4.8–10.8)
WBC # FLD AUTO: 5.85 K/UL — SIGNIFICANT CHANGE UP (ref 4.8–10.8)

## 2023-06-26 PROCEDURE — 76705 ECHO EXAM OF ABDOMEN: CPT | Mod: 26

## 2023-06-26 PROCEDURE — 99232 SBSQ HOSP IP/OBS MODERATE 35: CPT | Mod: 24

## 2023-06-26 PROCEDURE — 99232 SBSQ HOSP IP/OBS MODERATE 35: CPT

## 2023-06-26 PROCEDURE — 93970 EXTREMITY STUDY: CPT | Mod: 26

## 2023-06-26 PROCEDURE — 93306 TTE W/DOPPLER COMPLETE: CPT | Mod: 26

## 2023-06-26 RX ORDER — POLYETHYLENE GLYCOL 3350 17 G/17G
17 POWDER, FOR SOLUTION ORAL DAILY
Refills: 0 | Status: DISCONTINUED | OUTPATIENT
Start: 2023-06-26 | End: 2023-06-30

## 2023-06-26 RX ORDER — CIPROFLOXACIN LACTATE 400MG/40ML
400 VIAL (ML) INTRAVENOUS EVERY 12 HOURS
Refills: 0 | Status: DISCONTINUED | OUTPATIENT
Start: 2023-06-26 | End: 2023-06-28

## 2023-06-26 RX ORDER — SENNA PLUS 8.6 MG/1
2 TABLET ORAL AT BEDTIME
Refills: 0 | Status: DISCONTINUED | OUTPATIENT
Start: 2023-06-26 | End: 2023-06-30

## 2023-06-26 RX ORDER — ASPIRIN/CALCIUM CARB/MAGNESIUM 324 MG
81 TABLET ORAL DAILY
Refills: 0 | Status: DISCONTINUED | OUTPATIENT
Start: 2023-06-26 | End: 2023-06-30

## 2023-06-26 RX ORDER — ERGOCALCIFEROL 1.25 MG/1
50000 CAPSULE ORAL DAILY
Refills: 0 | Status: COMPLETED | OUTPATIENT
Start: 2023-06-26 | End: 2023-06-28

## 2023-06-26 RX ORDER — ENOXAPARIN SODIUM 100 MG/ML
40 INJECTION SUBCUTANEOUS EVERY 24 HOURS
Refills: 0 | Status: DISCONTINUED | OUTPATIENT
Start: 2023-06-26 | End: 2023-06-30

## 2023-06-26 RX ORDER — METRONIDAZOLE 500 MG
500 TABLET ORAL EVERY 8 HOURS
Refills: 0 | Status: DISCONTINUED | OUTPATIENT
Start: 2023-06-26 | End: 2023-06-28

## 2023-06-26 RX ORDER — CEFAZOLIN SODIUM 1 G
2000 VIAL (EA) INJECTION EVERY 8 HOURS
Refills: 0 | Status: DISCONTINUED | OUTPATIENT
Start: 2023-06-26 | End: 2023-06-28

## 2023-06-26 RX ADMIN — Medication 100 MILLIGRAM(S): at 15:39

## 2023-06-26 RX ADMIN — PIPERACILLIN AND TAZOBACTAM 25 GRAM(S): 4; .5 INJECTION, POWDER, LYOPHILIZED, FOR SOLUTION INTRAVENOUS at 05:38

## 2023-06-26 RX ADMIN — LACOSAMIDE 100 MILLIGRAM(S): 50 TABLET ORAL at 05:37

## 2023-06-26 RX ADMIN — Medication 4: at 17:20

## 2023-06-26 RX ADMIN — Medication 3: at 12:03

## 2023-06-26 RX ADMIN — LACOSAMIDE 100 MILLIGRAM(S): 50 TABLET ORAL at 18:48

## 2023-06-26 RX ADMIN — PANTOPRAZOLE SODIUM 40 MILLIGRAM(S): 20 TABLET, DELAYED RELEASE ORAL at 05:37

## 2023-06-26 RX ADMIN — Medication 100 MILLIGRAM(S): at 21:05

## 2023-06-26 RX ADMIN — Medication 3 UNIT(S): at 08:07

## 2023-06-26 RX ADMIN — Medication 1 DROP(S): at 18:48

## 2023-06-26 RX ADMIN — Medication 2: at 08:07

## 2023-06-26 RX ADMIN — Medication 200 MILLIGRAM(S): at 18:48

## 2023-06-26 RX ADMIN — SENNA PLUS 2 TABLET(S): 8.6 TABLET ORAL at 21:06

## 2023-06-26 RX ADMIN — INSULIN GLARGINE 10 UNIT(S): 100 INJECTION, SOLUTION SUBCUTANEOUS at 21:05

## 2023-06-26 RX ADMIN — LEVETIRACETAM 750 MILLIGRAM(S): 250 TABLET, FILM COATED ORAL at 05:37

## 2023-06-26 RX ADMIN — Medication 81 MILLIGRAM(S): at 12:03

## 2023-06-26 RX ADMIN — Medication 3 UNIT(S): at 12:03

## 2023-06-26 RX ADMIN — Medication 1 DROP(S): at 05:37

## 2023-06-26 RX ADMIN — ENOXAPARIN SODIUM 40 MILLIGRAM(S): 100 INJECTION SUBCUTANEOUS at 12:04

## 2023-06-26 RX ADMIN — Medication 1 DROP(S): at 23:25

## 2023-06-26 RX ADMIN — Medication 100 MILLIGRAM(S): at 15:29

## 2023-06-26 RX ADMIN — ERGOCALCIFEROL 50000 UNIT(S): 1.25 CAPSULE ORAL at 12:04

## 2023-06-26 RX ADMIN — LEVETIRACETAM 750 MILLIGRAM(S): 250 TABLET, FILM COATED ORAL at 18:48

## 2023-06-26 RX ADMIN — Medication 100 MILLIGRAM(S): at 21:08

## 2023-06-26 RX ADMIN — Medication 3 UNIT(S): at 17:19

## 2023-06-26 RX ADMIN — Medication 1 DROP(S): at 12:04

## 2023-06-26 NOTE — DISCHARGE NOTE PROVIDER - NSDCMRMEDTOKEN_GEN_ALL_CORE_FT
Actos 15 mg oral tablet: 1 orally once a day  albuterol 90 mcg/inh inhalation aerosol: 2 puff(s) inhaled every 6 hours  amoxicillin-clavulanate 875 mg-125 mg oral tablet: 1 tab(s) orally every 12 hours  aspirin 81 mg oral tablet, chewable: 1 tab(s) orally once a day; take with food  Incruse Ellipta 62.5 mcg/inh inhalation powder: 1 inhaler(s) inhaled once a day   Janumet 50 mg-1000 mg oral tablet: 1 tab(s) orally 2 times a day with meals (with breakfast and with dinner)  Jardiance 25 mg oral tablet: 1 orally once a day  lacosamide 100 mg oral tablet: 1 tab(s) orally every 12 hours MDD:200 mg  levETIRAcetam 750 mg oral tablet: 1 orally 2 times a day  ofloxacin 0.3% ophthalmic solution: 1 in the right eye 4 times a day  omeprazole 20 mg oral delayed release capsule: 1 cap(s) orally once a day in the morning, take 30 minutes before breakfast  prednisoLONE sodium phosphate 1% ophthalmic solution: 1 in the right eye 4 times a day  Trulicity Pen 3 mg/0.5 mL subcutaneous solution: 3 subcutaneously every 7 days   Actos 15 mg oral tablet: 1 orally once a day  albuterol 90 mcg/inh inhalation aerosol: 2 puff(s) inhaled every 6 hours  aspirin 81 mg oral tablet, chewable: 1 tab(s) orally once a day; take with food  cefTRIAXone: 2,000 milligram(s) intravenous once a day Until 7/9/2023  Incruse Ellipta 62.5 mcg/inh inhalation powder: 1 inhaler(s) inhaled once a day   Janumet 50 mg-1000 mg oral tablet: 1 tab(s) orally 2 times a day with meals (with breakfast and with dinner)  Jardiance 25 mg oral tablet: 1 orally once a day  lacosamide 100 mg oral tablet: 1 tab(s) orally every 12 hours MDD:200 mg  levETIRAcetam 750 mg oral tablet: 1 orally 2 times a day  ofloxacin 0.3% ophthalmic solution: 1 in the right eye 4 times a day  omeprazole 20 mg oral delayed release capsule: 1 cap(s) orally once a day in the morning, take 30 minutes before breakfast  prednisoLONE sodium phosphate 1% ophthalmic solution: 1 in the right eye 4 times a day  Trulicity Pen 3 mg/0.5 mL subcutaneous solution: 3 subcutaneously every 7 days   Actos 15 mg oral tablet: 1 orally once a day  albuterol 90 mcg/inh inhalation aerosol: 2 puff(s) inhaled every 6 hours  amoxicillin-clavulanate 875 mg-125 mg oral tablet: 1 tab(s) orally every 12 hours From 7/10/2023- 8/7/2023  aspirin 81 mg oral tablet, chewable: 1 tab(s) orally once a day; take with food  cefTRIAXone: 2,000 milligram(s) intravenous once a day Until 7/9/2023  ciprofloxacin 750 mg oral tablet: 1 tab(s) orally every 12 hours Until 8/7/2023  Incruse Ellipta 62.5 mcg/inh inhalation powder: 1 inhaler(s) inhaled once a day   Janumet 50 mg-1000 mg oral tablet: 1 tab(s) orally 2 times a day with meals (with breakfast and with dinner)  Jardiance 25 mg oral tablet: 1 orally once a day  lacosamide 100 mg oral tablet: 1 tab(s) orally every 12 hours MDD:200 mg  levETIRAcetam 750 mg oral tablet: 1 orally 2 times a day  metroNIDAZOLE 500 mg oral tablet: 1 tab(s) orally every 8 hours Until 7/9/2023  ofloxacin 0.3% ophthalmic solution: 1 in the right eye 4 times a day  omeprazole 20 mg oral delayed release capsule: 1 cap(s) orally once a day in the morning, take 30 minutes before breakfast  prednisoLONE sodium phosphate 1% ophthalmic solution: 1 in the right eye 4 times a day  senna leaf extract oral tablet: 2 tab(s) orally once a day (at bedtime)  Trulicity Pen 3 mg/0.5 mL subcutaneous solution: 3 subcutaneously every 7 days

## 2023-06-26 NOTE — DISCHARGE NOTE PROVIDER - NSDCFUADDINST_GEN_ALL_CORE_FT
Continue with local wound care   Weight Bearing Status; Weight Bearing As Tolerated - Right Lower Eextremity heel touch

## 2023-06-26 NOTE — DISCHARGE NOTE PROVIDER - NSDCFUSCHEDAPPT_GEN_ALL_CORE_FT
[facility]  CARDIOLOGY [address] [facility]  Scheduled Appointment: 07/10/2023

## 2023-06-26 NOTE — PROGRESS NOTE ADULT - SUBJECTIVE AND OBJECTIVE BOX
Podiatry Progress Note    Subjective:  JOSÉ MANUEL PORTER is a  73y Male.   Seen bedside.   Patient is a 73y old  Male who presents with a chief complaint of     Past Medical History and Surgical History  PAST MEDICAL & SURGICAL HISTORY:  DM (diabetes mellitus)  Type 2, 12/27/18 hgb aic  11.2      HTN (hypertension)      Dyslipidemia      Diabetic foot ulcer      Depression      GERD (gastroesophageal reflux disease)      Neuropathy, diabetic      Toe amputation status, right  (2008)      History of amputation of toe  LT -partial 1st toe           Objective:  Vital Signs Last 24 Hrs  T(C): 35.9 (26 Jun 2023 05:02), Max: 35.9 (26 Jun 2023 05:02)  T(F): 96.7 (26 Jun 2023 05:02), Max: 96.7 (26 Jun 2023 05:02)  HR: 67 (26 Jun 2023 05:02) (67 - 67)  BP: 120/57 (26 Jun 2023 05:02) (120/57 - 142/69)  BP(mean): 82 (26 Jun 2023 05:02) (82 - 96)  RR: 18 (26 Jun 2023 05:02) (18 - 18)  SpO2: --    Parameters below as of 26 Jun 2023 05:02  Patient On (Oxygen Delivery Method): room air                            11.7   5.85  )-----------( 291      ( 26 Jun 2023 07:21 )             36.9                 06-26    138  |  105  |  14  ----------------------------<  266<H>  5.0   |  23  |  1.0    Ca    8.8      26 Jun 2023 07:21  Mg     2.0     06-26    TPro  6.0  /  Alb  3.1<L>  /  TBili  0.3  /  DBili  x   /  AST  33  /  ALT  35  /  AlkPhos  135<H>  06-26          Physical Exam - Right Lower Extremity Focused:   Derm: Dorsal right foot ulcer with granular tissue present, deep to bone, mild macerated borders, no malodor or purulence noted. Sutures intact to plantar foot surgical site; No signs of dehiscence; No drainage.   Vascular: DP and PT Pulses Diminished; Foot is Warm to Warm to the touch; Capillary Refill Time < 3 Seconds;    Neuro: Protective Sensation Diminished / Moderately Neuropathic   MSK: minimal Pain On Palpation at Wound Site     Assessment:  s/p exc dbx st/b w/ wound washout; DOS: 6/23/23; stable    Plan:  Chart reviewed and Patient evaluated. All Questions and Concerns Addressed and Answered  Local Wound Care; Wound Flushed w/ NS; Wound Packed w/ iodoform / DSD / Kerlix / ACE  Continue with local wound care q24h as stated above; patient has established VNS services   Weight Bearing Status; WBAT RLE heel touch  Patient is stable from podiatry standpoint for discharge  Follow up with Dr. Bhatia @ [address] [place] [address] 1 week post discharge  Discussed Plan w/ Dr. Bhatia    Podiatry    Podiatry Progress Note    Subjective:  JOSÉ MANUEL PORTER is a  73y Male.   Seen bedside.   Patient is a 73y old  Male who presents with a chief complaint of     Past Medical History and Surgical History  PAST MEDICAL & SURGICAL HISTORY:  DM (diabetes mellitus)  Type 2, 18 hgb aic  11.2      HTN (hypertension)      Dyslipidemia      Diabetic foot ulcer      Depression      GERD (gastroesophageal reflux disease)      Neuropathy, diabetic      Toe amputation status, right  ()      History of amputation of toe  LT -partial 1st toe           Objective:  Vital Signs Last 24 Hrs  T(C): 35.9 (2023 05:02), Max: 35.9 (2023 05:02)  T(F): 96.7 (2023 05:02), Max: 96.7 (2023 05:02)  HR: 67 (2023 05:02) (67 - 67)  BP: 120/57 (2023 05:02) (120/57 - 142/69)  BP(mean): 82 (2023 05:02) (82 - 96)  RR: 18 (2023 05:02) (18 - 18)  SpO2: --    Parameters below as of 2023 05:02  Patient On (Oxygen Delivery Method): room air                            11.7   5.85  )-----------( 291      ( 2023 07:21 )             36.9                     138  |  105  |  14  ----------------------------<  266<H>  5.0   |  23  |  1.0    Ca    8.8      2023 07:21  Mg     2.0         TPro  6.0  /  Alb  3.1<L>  /  TBili  0.3  /  DBili  x   /  AST  33  /  ALT  35  /  AlkPhos  135<H>             Culture - Abscess with Gram Stain             Final    No Documents Attached      ****Culture - Abscess with Gram Stain Report ****      Patient:   JOSÉ MANUEL PORTER    MRN:       [id number]    :       1950    FIN:       [id number]  SEX:       Male    Accession: [id number]    Microbiology      Test Name:                RUBINA ABS [P1]               Accession:                [id number]  Source:                   .Abscess                 Body Site:  Collected Date/Time:      2023 09:36 EDT      Received Date/Time:       2023 01:54 EDT  Start Date/Time:          2023 01:54 EDT      Free Text Source:    ***FINAL REPORT***  Final Report  []  Reported Date/Time: 2023 09:58 EDT  Culture yields >4 types of aerobic and/or anaerobic bacteria  Call client services within 7 days if further workup is clinically indicated.  including  Numerous Pseudomonas aeruginosa  Moderate Bacteroides fragilis "Susceptibilities not performed"      ***SUSCEPTIBILITY RESULTS***                    Pseaer  Antibiotic        MDIL          MINT  Amikacin          <=16          Susceptible  Aztreonam         <=4           Susceptible  Cefepime          <=2           Susceptible  Ceftazidime       4             Susceptible  Ciprofloxacin     <=0.25        Susceptible  Gentamicin        <=2           Susceptible  Imipenem          <=1           Susceptible  Levofloxacin      <=0.5         Susceptible  Meropenem         <=1           Susceptible  Piperacillin/     <=8           Susceptible  Tazobactam  Tobramycin        <=2           Susceptible      Performing Locations  P1:   This test was performed at:        [facility], [id number]       Ordered by: SERINA BROOKE       Collected/Examined: 2023 09:36AM         Verification Required       Stage: Final          Performed at: [facility] (Med Director: Jose Mccrary)       Resulted: 2023 09:58AM       Last Updated: 2023 09:58AM       Accession: [id number]       To be verified by: JONN BHATIA                   Physical Exam - Right Lower Extremity Focused:   Derm: Dorsal right foot ulcer with granular tissue present, deep to bone, mild macerated borders, no malodor or purulence noted. Sutures intact to plantar foot surgical site; No signs of dehiscence; No drainage.   Vascular: DP and PT Pulses Diminished; Foot is Warm to Warm to the touch; Capillary Refill Time < 3 Seconds;    Neuro: Protective Sensation Diminished / Moderately Neuropathic   MSK: minimal Pain On Palpation at Wound Site     Assessment:  s/p exc dbx st/b w/ wound washout; DOS: 23; stable    Plan:  Chart reviewed and Patient evaluated. All Questions and Concerns Addressed and Answered  Local Wound Care; Wound Flushed w/ NS; Wound Packed w/ iodoform / DSD / Kerlix / ACE  Continue with local wound care q24h as stated above; patient has established VNS services   Weight Bearing Status; WBAT RLE heel touch  Wound Culture; See Report above;   Patient is stable from podiatry standpoint for discharge  Follow up with Dr. Bhatia @ [address] [place] [address] 1 week post discharge  Discussed Plan w/ Dr. Bhatia    Podiatry

## 2023-06-26 NOTE — PROGRESS NOTE ADULT - ASSESSMENT
74 yo M w  PMH of DM  HLD  HTN  bipolar disorder  GERD  OM  with toe amputations was in by his podiatrist due to non healing ulceration to right foot.     #Right infected diabetic foot ulcer complicated by an abscess s/p drainage and debridement by podiatry          - Right foot x-ray: Possible 1.1 cm gas containing collection seen abutting the first metatarsal transection (new compared to XR in 5/23)  - ESR: 46, Arterial duplex: WNL   - podiatry: Excisional debridement of soft tissue (6/23)  - Blood cx: MSSA/Strep  - Wound cx: MSSA/Pseudomona/Kleb  - Zosyn to cover MSSA and pseudomonas  - Daily bcx until -ve, TTE (ro IE)> ID fu  - LE duplex    #Elevated Alk phos- RUQ US    #DMT2- c/w Basal Bolus  #Asthma- not in exacerbation- c/w inhalers  #Seizure Disorder- c/w home Keppra 750mg BID, home lacosamide 100mg BID  #HO Bipolar Depression- Stable- Monitor off meds  #GERD- cw PPI    #Misc  -Routine Lab frequency: PRN  -DVT prophylaxis: Lovenox  -GI prophylaxis: Protonix  -Diet: DASH/CC  -Code status: Full  -Activity: IAT  -Bowel regimen: Miralax, Senna  -Urinary catheter: None  -Dispo: Floors 72 yo M w  PMH of DM  HLD  HTN  bipolar disorder  GERD  OM  with toe amputations was in by his podiatrist due to non healing ulceration to right foot.     #Right infected diabetic foot ulcer complicated by an abscess s/p drainage and debridement by podiatry          - Right foot x-ray: Possible 1.1 cm gas containing collection seen abutting the first metatarsal transection (new compared to XR in 5/23)  - ESR: 46, Arterial duplex: WNL   - podiatry: Excisional debridement of soft tissue (6/23)  - Blood cx: MSSA/Strep  - Wound cx: MSSA/Pseudomona/Kleb  - Zosyn to cover MSSA and pseudomonas  - Daily bcx until -ve, TTE (ro IE)> ID fu  - LE duplex  - [facility] Podiatry, pt restarted on dvt ppx this am.    #Elevated Alk phos- improved     #DMT2- c/w Basal Bolus  #Asthma- not in exacerbation- c/w inhalers  #Seizure Disorder- c/w home Keppra 750mg BID, home lacosamide 100mg BID  #HO Bipolar Depression- Stable- Monitor off meds  #GERD- cw Proton Pump Inhibitor    #Misc  -Routine Lab frequency: PRN  -DVT prophylaxis: Lovenox  -GI prophylaxis: Protonix  -Diet: DASH/CC  -Code status: Full  -Activity: IAT  -Bowel regimen: Miralax, Senna  -Urinary catheter: None  -Dispo: Floors

## 2023-06-26 NOTE — DISCHARGE NOTE PROVIDER - CARE PROVIDER_API CALL
Shane Garnett  Internal Medicine  [address] [place] [address]  Phone: [phone]  Fax: [phone]  Established Patient  Follow Up Time: 1 week    Royal Bhatia  Podiatric Medicine  [address] [place] [address], [address] [place] [address]  Phone: [phone]  Fax: [phone]  Follow Up Time: 2 weeks   Shane Garnett  Internal Medicine  [address] [place] [address]  Phone: [phone]  Fax: [phone]  Established Patient  Follow Up Time: 1 week    Royal Bhatia  Podiatric Medicine  [address] [facility], [address]rd [place] [address]  Phone: [phone]  Fax: [phone]  Follow Up Time: 2 weeks    Gary Turpin  Infectious Disease  [address] [place] [address]  Phone: [phone]  Fax: [phone]  Follow Up Time: 2 weeks

## 2023-06-26 NOTE — DISCHARGE NOTE PROVIDER - HOSPITAL COURSE
72 yo M w  PMH of DM  HLD  HTN  bipolar disorder  GERD  OM  with toe amputations was in by his podiatrist due to non healing ulceration to right foot.  Pt was recently admitted to [facility] 5/23 for debridement of the same ulcer.  Pt was seen in ER for similar sx 6/17, followed up with his podiatrist Dr Bhatia today who referred pt back to ER. As per podiatry's note the ulcer had no purulent discharge on 6/17, but today has malodorous purulent discharge.  At my encounter pt denies pain at the site and denies fever / NV / diarrhoea / CP.    Patient was found to have DFU c/b emphysematous abscess, drainage and debridement was done by podiatry. Arterial duplex was wnl. Wound Cx grew MSSA/Pseudomonas/Klebsiella. Blood Cx was growing MSSA/Strep.         Patient is medically stable and ready for discharge. 72 yo M w  PMH of DM  HLD  HTN  bipolar disorder  GERD  OM  with toe amputations was in by his podiatrist due to non healing ulceration to right foot.  Pt was recently admitted to [facility] 5/23 for debridement of the same ulcer.  Pt was seen in ER for similar sx 6/17, followed up with his podiatrist Dr Bhatia today who referred pt back to ER. As per podiatry's note the ulcer had no purulent discharge on 6/17, but today has malodorous purulent discharge.  At my encounter pt denies pain at the site and denies fever / NV / diarrhoea / CP.    Patient was found to have DFU c/b abscess right foot first met with associated chronic OM, drainage and debridement was done by podiatry. Arterial duplex was wnl.   6/21 BCx JACOB, Strep ( did not grow )  Etiology of the JACOB bacteremia is the infection right foot  6/25 BCx NG  ECHO no vegetations  No evidence of endocarditis  6/21 WCx JACOB, P aeruginosa, Klebsiella  6/23 WCx JACOB, P aeruginosa, S lugdunensis  6/21 ESR/CRP 46/77.3  5/18 Abscess: Strep mitis, E coli, Bacteroides  ID was on board pt started on IV Ancef 2 gm iv q8h ,flagyl 500 mg iv q8h and Ciprofloxacin 40o mg iv q12h  Pt is medically clear for discharge on IV and PO antibiotics:    > Rocephin 2 gm iv q24h till 7/9  > po 500 mg q8h till 7/9  > po Ciprofloxacin 750 mg q12h till 8/7  ----->on 7/9 change Rocephin and flagyl to Augmentin 875 mg q12h till 8/7       74 yo M w  PMH of DM  HLD  HTN  bipolar disorder  GERD  OM  with toe amputations was in by his podiatrist due to non healing ulceration to right foot.  Pt was recently admitted to [facility] 5/23 for debridement of the same ulcer.  Pt was seen in ER for similar sx 6/17, followed up with his podiatrist Dr Bhatia today who referred pt back to ER. As per podiatry's note the ulcer had no purulent discharge on 6/17, but today has malodorous purulent discharge.  At my encounter pt denies pain at the site and denies fever / NV / diarrhoea / CP.    Patient was found to have DFU c/b abscess right foot first met with associated chronic OM, drainage and debridement was done by podiatry. Arterial duplex was wnl.   6/21 BCx JACOB, Strep ( did not grow )  Etiology of the JACOB bacteremia is the infection right foot  6/25 BCx NG  ECHO no vegetations  No evidence of endocarditis  6/21 WCx JACOB, P aeruginosa, Klebsiella  6/23 WCx JACOB, P aeruginosa, S lugdunensis  6/21 ESR/CRP 46/77.3  5/18 Abscess: Strep mitis, E coli, Bacteroides  ID was on board pt started on IV Rocephin 2 gm iv q8h ,flagyl 500 mg iv q8h and Ciprofloxacin 40o mg iv q12h  Pt is medically clear for discharge on IV(until 7/9) and PO antibiotics(until 8/7).         74 yo M w  PMH of DM  HLD  HTN  bipolar disorder  GERD  OM  with toe amputations was in by his podiatrist due to non healing ulceration to right foot.    Pt was recently admitted to [facility] 5/23 for debridement of the same ulcer.  Pt was seen in ER for similar sx 6/17, followed up with his podiatrist Dr Bhatia today who referred pt back to ER.   As per podiatry's note the ulcer had no purulent discharge on 6/17, but today has malodorous purulent discharge.  At my encounter pt denies pain at the site and denies fever / NV / diarrhoea / CP.    Patient was found to have DFU c/b abscess right foot first met with associated chronic OM, drainage and debridement was done by podiatry.   Arterial duplex was wnl.   6/21 BCx JACOB, Strep ( did not grow )  Etiology of the JACOB bacteremia is the infection right foot  6/25 BCx NG  ECHO no vegetations  No evidence of endocarditis  6/21 WCx JACOB, P aeruginosa, Klebsiella  6/23 WCx JACOB, P aeruginosa, S lugdunensis  6/21 ESR/CRP 46/77.3  5/18 Abscess: Strep mitis, E coli, Bacteroides  ID was on board pt started on IV Rocephin 2 gm iv q8h(till 7/9 and then augmentin 875 mg BID till 8/7) ,flagyl 500 PO q8h 7/9 and Ciprofloxacin 750 mg BID till 8/7  Pt is medically clear for discharge on IV(until 7/9) and PO antibiotics(until 8/7).

## 2023-06-26 NOTE — DISCHARGE NOTE PROVIDER - PROVIDER TOKENS
PROVIDER:[TOKEN:[[id number]],FOLLOWUP:[1 week],ESTABLISHEDPATIENT:[T]],PROVIDER:[TOKEN:[[id number]],FOLLOWUP:[2 weeks]] PROVIDER:[TOKEN:[[id number]],FOLLOWUP:[1 week],ESTABLISHEDPATIENT:[T]],PROVIDER:[TOKEN:[[id number]],FOLLOWUP:[2 weeks]],PROVIDER:[TOKEN:[[id number]],FOLLOWUP:[2 weeks]]

## 2023-06-26 NOTE — PROGRESS NOTE ADULT - SUBJECTIVE AND OBJECTIVE BOX
JOSÉ MANUEL PORTER  73y, Male    All available historical data reviewed    OVERNIGHT EVENTS:  6/23 s/p Sx : devitalized tissue    ROS:  General: Denies rigors, nightsweats  HEENT: Denies headache, rhinorrhea, sore throat, eye pain  CV: Denies CP, palpitations  PULM: Denies wheezing, hemoptysis  GI: Denies hematemesis, hematochezia, melena  : Denies discharge, hematuria  MSK: Denies arthralgias, myalgias  SKIN: Denies rash, lesions  NEURO: Denies paresthesias, weakness  PSYCH: Denies depression, anxiety    VITALS:  T(F): 96.7, Max: 96.7 (06-26-23 @ 05:02)  HR: 67  BP: 120/57  RR: 18Vital Signs Last 24 Hrs  T(C): 35.9 (26 Jun 2023 05:02), Max: 35.9 (26 Jun 2023 05:02)  T(F): 96.7 (26 Jun 2023 05:02), Max: 96.7 (26 Jun 2023 05:02)  HR: 67 (26 Jun 2023 05:02) (67 - 67)  BP: 120/57 (26 Jun 2023 05:02) (120/57 - 142/69)  BP(mean): 82 (26 Jun 2023 05:02) (82 - 96)  RR: 18 (26 Jun 2023 05:02) (18 - 18)  SpO2: --    Parameters below as of 26 Jun 2023 05:02  Patient On (Oxygen Delivery Method): room air        TESTS & MEASUREMENTS:                        11.7   5.85  )-----------( 291      ( 26 Jun 2023 07:21 )             36.9     06-26    138  |  105  |  14  ----------------------------<  266<H>  5.0   |  23  |  1.0    Ca    8.8      26 Jun 2023 07:21  Mg     2.0     06-26    TPro  6.0  /  Alb  3.1<L>  /  TBili  0.3  /  DBili  x   /  AST  33  /  ALT  35  /  AlkPhos  135<H>  06-26    LIVER FUNCTIONS - ( 26 Jun 2023 07:21 )  Alb: 3.1 g/dL / Pro: 6.0 g/dL / ALK PHOS: 135 U/L / ALT: 35 U/L / AST: 33 U/L / GGT: x             Culture - Surgical Swab (collected 06-23-23 @ 14:02)  Source: .Surgical Swab None  Preliminary Report (06-26-23 @ 12:09):    Rare Pseudomonas aeruginosa    Few Staphylococcus aureus    Few Staphylococcus lugdunensis Susceptibility to follow.  Organism: Pseudomonas aeruginosa  Staphylococcus aureus (06-26-23 @ 11:46)  Organism: Staphylococcus aureus (06-26-23 @ 11:46)      Method Type: LINDSEY      -  Ampicillin/Sulbactam: S <=8/4      -  Cefazolin: S <=4      -  Clindamycin: S <=0.25      -  Erythromycin: R >4      -  Gentamicin: S <=1 Should not be used as monotherapy      -  Oxacillin: S <=0.25 Oxacillin predicts susceptibility for dicloxacillin, methicillin, and nafcillin      -  Penicillin: R >8      -  Rifampin: S <=1 Should not be used as monotherapy      -  Tetracycline: S <=1      -  Trimethoprim/Sulfamethoxazole: S <=0.5/9.5      -  Vancomycin: S 2  Organism: Pseudomonas aeruginosa (06-26-23 @ 08:26)      Method Type: LINDSEY      -  Amikacin: S <=16      -  Aztreonam: S <=4      -  Cefepime: S <=2      -  Ceftazidime: S <=1      -  Ciprofloxacin: S <=0.25      -  Gentamicin: S <=2      -  Imipenem: S <=1      -  Levofloxacin: S <=0.5      -  Meropenem: S <=1      -  Piperacillin/Tazobactam: S <=8      -  Tobramycin: S <=2    Culture - Fungal, Other (collected 06-23-23 @ 14:02)  Source: .Other None  Preliminary Report (06-26-23 @ 09:07):    Testing in progress    Culture - Acid Fast - Other w/Smear (collected 06-23-23 @ 14:02)  Source: .Other None    Culture - Abscess with Gram Stain (collected 06-21-23 @ 19:50)  Source: .Abscess foot  Gram Stain (06-22-23 @ 04:54):    Moderate polymorphonuclear leukocytes seen per low power field    Moderate Gram positive cocci in pairs seen per oil power field    Rare Gram Negative Rods seen per oil power field  Preliminary Report (06-25-23 @ 09:29):    Numerous Pseudomonas aeruginosa    Numerous Staphylococcus aureus    Few Klebsiella pneumoniae    Moderate Corynebacterium striatum group "Susceptibilities not performed"  Organism: Pseudomonas aeruginosa  Staphylococcus aureus  Klebsiella pneumoniae (06-25-23 @ 09:28)  Organism: Klebsiella pneumoniae (06-25-23 @ 09:28)      Method Type: LINDSEY      -  Amikacin: S <=16      -  Amoxicillin/Clavulanic Acid: S <=8/4      -  Ampicillin: R >16 These ampicillin results predict results for amoxicillin      -  Ampicillin/Sulbactam: S <=4/2 Enterobacter, Klebsiella aerogenes, Citrobacter, and Serratia may develop resistance during prolonged therapy (3-4 days)      -  Aztreonam: S <=4      -  Cefazolin: S <=2 Enterobacter, Klebsiella aerogenes, Citrobacter, and Serratia may develop resistance during prolonged therapy (3-4 days)      -  Cefepime: S <=2      -  Cefoxitin: S <=8      -  Ceftriaxone: S <=1 Enterobacter, Klebsiella aerogenes, Citrobacter, and Serratia may develop resistance during prolonged therapy      -  Ciprofloxacin: S <=0.25      -  Ertapenem: S <=0.5      -  Gentamicin: S <=2      -  Imipenem: S <=1      -  Levofloxacin: S <=0.5      -  Meropenem: S <=1      -  Piperacillin/Tazobactam: S <=8      -  Tobramycin: S <=2      -  Trimethoprim/Sulfamethoxazole: S <=0.5/9.5  Organism: Staphylococcus aureus (06-24-23 @ 10:07)      Method Type: LINDSEY      -  Ampicillin/Sulbactam: S <=8/4      -  Cefazolin: S <=4      -  Clindamycin: S <=0.25      -  Erythromycin: R >4      -  Gentamicin: S <=1 Should not be used as monotherapy      -  Oxacillin: S <=0.25 Oxacillin predicts susceptibility for dicloxacillin, methicillin, and nafcillin      -  Penicillin: R 4      -  Rifampin: S <=1 Should not be used as monotherapy      -  Tetracycline: S <=1      -  Trimethoprim/Sulfamethoxazole: S <=0.5/9.5      -  Vancomycin: S 2  Organism: Pseudomonas aeruginosa (06-24-23 @ 10:06)      Method Type: LINDSEY      -  Amikacin: S <=16      -  Aztreonam: I 16      -  Cefepime: S 4      -  Ceftazidime: S <=1      -  Ciprofloxacin: S <=0.25      -  Gentamicin: S <=2      -  Imipenem: S <=1      -  Levofloxacin: S <=0.5      -  Meropenem: S <=1      -  Piperacillin/Tazobactam: S <=8      -  Tobramycin: S <=2    Culture - Blood (collected 06-21-23 @ 15:36)  Source: .Blood Blood  Gram Stain (06-22-23 @ 15:30):    Growth in aerobic bottle: Gram Positive Cocci in Clusters    Growth in anaerobic bottle: Gram Positive Cocci in Clusters  Final Report (06-24-23 @ 11:52):    Growth in aerobic and anaerobic bottles: Staphylococcus aureus  Organism: Staphylococcus aureus (06-24-23 @ 11:52)  Organism: Staphylococcus aureus (06-24-23 @ 11:52)      Method Type: LINDSEY      -  Ampicillin/Sulbactam: S <=8/4      -  Cefazolin: S <=4      -  Clindamycin: S <=0.25      -  Erythromycin: R >4      -  Gentamicin: S <=1 Should not be used as monotherapy      -  Oxacillin: S <=0.25 Oxacillin predicts susceptibility for dicloxacillin, methicillin, and nafcillin      -  Penicillin: R >8      -  Rifampin: S <=1 Should not be used as monotherapy      -  Tetracycline: S <=1      -  Trimethoprim/Sulfamethoxazole: S <=0.5/9.5      -  Vancomycin: S 2    Culture - Blood (collected 06-21-23 @ 15:36)  Source: .Blood Blood  Gram Stain (06-22-23 @ 12:23):    Growth in aerobic bottle: Gram Positive Cocci in Clusters and Gram    Positive Cocci in Pairs and Chains    Growth in anaerobic bottle: Gram Positive Cocci in Pairs and Chains  Final Report (06-24-23 @ 12:13):    Growth in aerobic and anaerobic bottles: Staphylococcus aureus    Growth in aerobic and anaerobic bottles: Gram Positive Cocci in Pairs and    Chains Most closely resembling Alpha hemolytic strep "Susceptibilities    not performed"    ***Blood Panel PCR results on this specimen are available    approximately 3 hours after the Gram stain result.***    Gram stain, PCR, and/or culture results may not always    correspond due to difference in methodologies.    ************************************************************    This PCR assay was performed by multiplex PCR. This    Assay tests for 66 bacterial and resistance gene targets.    Please refer to the [facility] test directory    at [email or web address] for details.  Organism: Blood Culture PCR  Staphylococcus aureus (06-24-23 @ 12:13)  Organism: Staphylococcus aureus (06-24-23 @ 12:13)      Method Type: LINDSEY      -  Ampicillin/Sulbactam: S <=8/4      -  Cefazolin: S <=4      -  Clindamycin: S <=0.25      -  Erythromycin: R >4      -  Gentamicin: S <=1 Should not be used as monotherapy      -  Oxacillin: S <=0.25 Oxacillin predicts susceptibility for dicloxacillin, methicillin, and nafcillin      -  Penicillin: R 8      -  Rifampin: S <=1 Should not be used as monotherapy      -  Tetracycline: S <=1      -  Trimethoprim/Sulfamethoxazole: S <=0.5/9.5      -  Vancomycin: S 2  Organism: Blood Culture PCR (06-24-23 @ 12:13)      Method Type: PCR      -  Methicillin SENSITIVE Staphylococcus aureus (MSSA): Detec Any isolate of Staphylococcus aureus from a blood culture is NOT considered a contaminant.      -  Streptococcus sp. (Not Grp A, B or S pneumoniae): Detec      Urinalysis Basic - ( 26 Jun 2023 07:21 )    Color: x / Appearance: x / SG: x / pH: x  Gluc: 266 mg/dL / Ketone: x  / Bili: x / Urobili: x   Blood: x / Protein: x / Nitrite: x   Leuk Esterase: x / RBC: x / WBC x   Sq Epi: x / Non Sq Epi: x / Bacteria: x          RADIOLOGY & ADDITIONAL TESTS:  Personal review of radiological diagnostics performed  Echo and EKG results noted when applicable.     MEDICATIONS:  albuterol    90 MICROgram(s) HFA Inhaler 2 Puff(s) Inhalation every 6 hours PRN  aspirin  chewable 81 milliGRAM(s) Oral daily  budesonide  80 MICROgram(s)/formoterol 4.5 MICROgram(s) Inhaler 2 Puff(s) Inhalation two times a day  dextrose 50% Injectable 25 Gram(s) IV Push once  dextrose Oral Gel 15 Gram(s) Oral once PRN  enoxaparin Injectable 40 milliGRAM(s) SubCutaneous every 24 hours  ergocalciferol 26962 Unit(s) Oral daily  glucagon  Injectable 1 milliGRAM(s) IntraMuscular once  insulin glargine Injectable (LANTUS) 10 Unit(s) SubCutaneous at bedtime  insulin lispro (ADMELOG) corrective regimen sliding scale   SubCutaneous three times a day before meals  insulin lispro Injectable (ADMELOG) 3 Unit(s) SubCutaneous three times a day before meals  lacosamide 100 milliGRAM(s) Oral two times a day  levETIRAcetam 750 milliGRAM(s) Oral two times a day  pantoprazole    Tablet 40 milliGRAM(s) Oral before breakfast  piperacillin/tazobactam IVPB.. 3.375 Gram(s) IV Intermittent every 8 hours  polyethylene glycol 3350 17 Gram(s) Oral daily PRN  prednisoLONE acetate 1% Suspension 1 Drop(s) Right EYE every 6 hours  senna 2 Tablet(s) Oral at bedtime      ANTIBIOTICS:  piperacillin/tazobactam IVPB.. 3.375 Gram(s) IV Intermittent every 8 hours

## 2023-06-26 NOTE — DISCHARGE NOTE PROVIDER - NSDCCPCAREPLAN_GEN_ALL_CORE_FT
PRINCIPAL DISCHARGE DIAGNOSIS  Diagnosis: Diabetic ulcer of right foot  Assessment and Plan of Treatment: You had a Diabetic foot ulcer which had infection which spread into your blood. Please make sure to take antibiotics as precribed. Follow up wit [facility] podiatrist.  If you have increasing pain in your foot, fever, reddness, chills or rigors, please come back to ED for further evaluation.

## 2023-06-26 NOTE — PROGRESS NOTE ADULT - ASSESSMENT
· Assessment	  74 yo M w  PMH of DM  HLD  HTN  bipolar disorder  GERD  OM  with toe amputations was in by his podiatrist due to non healing ulceration to right foot.  Pt was recently admitted to [facility] 5/23 for debridement of the same ulcer.  Pt was seen in ER for similar sx 6/17, followed up with his podiatrist Dr Bhatia today who referred pt back to ER. As per podiatry's note the ulcer had no purulent discharge on 6/17, but today has malodorous purulent discharge.      XR foot: Possible 1.1 cm gas containing collection seen abutting the first metatarsal transection (new compared to XR in 5/23). No new cortical destruction to suggest osteomyelitis  Art duplex 5/23: wnl    All recent and past cultures reviewed.     IMPRESSION/RECOMMENDATIONS  Resolved abscess right foot first met with associated chronic OM  6/21 BCx JACOB, Strep ( did not grow  Etiology of the JACOB bacteremia is the infection right foot  6/21 WCx JACOB, P aeruginosa, Klebsiella  6/23 WCx JACOB, P aeruginosa, S lugdunensis  6/21 ESR/CRP 46/77.3  5/18 Abscess: Strep mitis, E coli, Bacteroides    -ECHO  -Daily BCx till repeatedly negative   -Ancef 2 gm iv q8h   -flagyl 500 mg iv q8h  -start Ciprofloxacin 40o mg iv q12h

## 2023-06-26 NOTE — PROGRESS NOTE ADULT - SUBJECTIVE AND OBJECTIVE BOX
SUBJECTIVE:    Patient is a 73y old Male who presents with a chief complaint of   Currently admitted to medicine with the primary diagnosis of Diabetic ulcer of right foot       Today is hospital day 5d.     HPI:  72 yo M w  PMH of DM  HLD  HTN  bipolar disorder  GERD  OM  with toe amputations was in by his podiatrist due to non healing ulceration to right foot.  Pt was recently admitted to [facility] 5/23 for debridement of the same ulcer.  Pt was seen in ER for similar sx 6/17, followed up with his podiatrist Dr Bhatia today who referred pt back to ER. As per podiatry's note the ulcer had no purulent discharge on 6/17, but today has malodorous purulent discharge.  At my encounter pt denies pain at the site and denies fever / NV / diarrhoea / CP.    In the ED, /102  Labs: lipase 107  XR foot:    Possible 1.1 cm gas containing collection seen abutting the first metatarsal transection (new compared to XR in 5/23)   No new cortical destruction to suggest osteomyelitis  art duplex 5/23: wnl     (22 Jun 2023 01:32)      PAST MEDICAL & SURGICAL HISTORY  DM (diabetes mellitus)  Type 2, 12/27/18 hgb aic  11.2    HTN (hypertension)    Dyslipidemia    Diabetic foot ulcer    Depression    GERD (gastroesophageal reflux disease)    Neuropathy, diabetic    Toe amputation status, right  (2008)    History of amputation of toe  LT -partial 1st toe        ALLERGIES:  No Known Allergies    MEDICATIONS:  ACTIVE MEDICATIONS  albuterol    90 MICROgram(s) HFA Inhaler 2 Puff(s) Inhalation every 6 hours PRN  aspirin  chewable 81 milliGRAM(s) Oral daily  budesonide  80 MICROgram(s)/formoterol 4.5 MICROgram(s) Inhaler 2 Puff(s) Inhalation two times a day  dextrose 50% Injectable 25 Gram(s) IV Push once  dextrose Oral Gel 15 Gram(s) Oral once PRN  enoxaparin Injectable 40 milliGRAM(s) SubCutaneous every 24 hours  ergocalciferol 95214 Unit(s) Oral daily  glucagon  Injectable 1 milliGRAM(s) IntraMuscular once  insulin glargine Injectable (LANTUS) 10 Unit(s) SubCutaneous at bedtime  insulin lispro (ADMELOG) corrective regimen sliding scale   SubCutaneous three times a day before meals  insulin lispro Injectable (ADMELOG) 3 Unit(s) SubCutaneous three times a day before meals  lacosamide 100 milliGRAM(s) Oral two times a day  levETIRAcetam 750 milliGRAM(s) Oral two times a day  pantoprazole    Tablet 40 milliGRAM(s) Oral before breakfast  piperacillin/tazobactam IVPB.. 3.375 Gram(s) IV Intermittent every 8 hours  prednisoLONE acetate 1% Suspension 1 Drop(s) Right EYE every 6 hours      VITALS:   T(F): 96.7  HR: 67  BP: 120/57  RR: 18  SpO2: --    LABS:                        11.7   5.85  )-----------( 291      ( 26 Jun 2023 07:21 )             36.9     06-26    138  |  105  |  14  ----------------------------<  266<H>  5.0   |  23  |  1.0    Ca    8.8      26 Jun 2023 07:21  Mg     2.0     06-26    TPro  6.0  /  Alb  3.1<L>  /  TBili  0.3  /  DBili  x   /  AST  33  /  ALT  35  /  AlkPhos  135<H>  06-26      Urinalysis Basic - ( 26 Jun 2023 07:21 )    Color: x / Appearance: x / SG: x / pH: x  Gluc: 266 mg/dL / Ketone: x  / Bili: x / Urobili: x   Blood: x / Protein: x / Nitrite: x   Leuk Esterase: x / RBC: x / WBC x   Sq Epi: x / Non Sq Epi: x / Bacteria: x            Culture - Surgical Swab (collected 23 Jun 2023 14:02)  Source: .Surgical Swab None  Preliminary Report (25 Jun 2023 14:03):    Rare Pseudomonas aeruginosa    Few Staphylococcus aureus  Organism: Pseudomonas aeruginosa (26 Jun 2023 08:26)  Organism: Pseudomonas aeruginosa (26 Jun 2023 08:26)    Culture - Fungal, Other (collected 23 Jun 2023 14:02)  Source: .Other None  Preliminary Report (26 Jun 2023 09:07):    Testing in progress    Culture - Acid Fast - Other w/Smear (collected 23 Jun 2023 14:02)  Source: .Other None              PHYSICAL EXAM:  GEN: No acute distress  LUNGS: Clear to auscultation bilaterally   HEART: S1/S2 present.    ABD: Soft, non-tender, non-distended.   EXT: RLE 1+ pitting edema. RLE wrapped.  NEURO: AAOX3 SUBJECTIVE:    Patient is a 73y old Male who presents with a chief complaint of   Currently admitted to medicine with the primary diagnosis of Diabetic ulcer of right foot       Today is hospital day 5d.     HPI:  74 yo M w  PMH of DM  HLD  HTN  bipolar disorder  GERD  OM  with toe amputations was in by his podiatrist due to non healing ulceration to right foot.  Pt was recently admitted to [facility] 5/23 for debridement of the same ulcer.  Pt was seen in ER for similar sx 6/17, followed up with his podiatrist Dr Bhatia today who referred pt back to ER. As per podiatry's note the ulcer had no purulent discharge on 6/17, but today has malodorous purulent discharge.  At my encounter pt denies pain at the site and denies fever / NV / diarrhoea / CP.    In the ED, /102  Labs: lipase 107  XR foot:    Possible 1.1 cm gas containing collection seen abutting the first metatarsal transection (new compared to XR in 5/23)   No new cortical destruction to suggest osteomyelitis  art duplex 5/23: wnl     (22 Jun 2023 01:32)    Attending note: Pt seen      PAST MEDICAL & SURGICAL HISTORY  DM (diabetes mellitus)  Type 2, 12/27/18 hgb aic  11.2    HTN (hypertension)    Dyslipidemia    Diabetic foot ulcer    Depression    GERD (gastroesophageal reflux disease)    Neuropathy, diabetic    Toe amputation status, right  (2008)    History of amputation of toe  LT -partial 1st toe        ALLERGIES:  No Known Allergies    MEDICATIONS:  ACTIVE MEDICATIONS  albuterol    90 MICROgram(s) HFA Inhaler 2 Puff(s) Inhalation every 6 hours PRN  aspirin  chewable 81 milliGRAM(s) Oral daily  budesonide  80 MICROgram(s)/formoterol 4.5 MICROgram(s) Inhaler 2 Puff(s) Inhalation two times a day  dextrose 50% Injectable 25 Gram(s) IV Push once  dextrose Oral Gel 15 Gram(s) Oral once PRN  enoxaparin Injectable 40 milliGRAM(s) SubCutaneous every 24 hours  ergocalciferol 62548 Unit(s) Oral daily  glucagon  Injectable 1 milliGRAM(s) IntraMuscular once  insulin glargine Injectable (LANTUS) 10 Unit(s) SubCutaneous at bedtime  insulin lispro (ADMELOG) corrective regimen sliding scale   SubCutaneous three times a day before meals  insulin lispro Injectable (ADMELOG) 3 Unit(s) SubCutaneous three times a day before meals  lacosamide 100 milliGRAM(s) Oral two times a day  levETIRAcetam 750 milliGRAM(s) Oral two times a day  pantoprazole    Tablet 40 milliGRAM(s) Oral before breakfast  piperacillin/tazobactam IVPB.. 3.375 Gram(s) IV Intermittent every 8 hours  prednisoLONE acetate 1% Suspension 1 Drop(s) Right EYE every 6 hours      VITALS:   T(F): 96.7  HR: 67  BP: 120/57  RR: 18  SpO2: --    LABS:                        11.7   5.85  )-----------( 291      ( 26 Jun 2023 07:21 )             36.9     06-26    138  |  105  |  14  ----------------------------<  266<H>  5.0   |  23  |  1.0    Ca    8.8      26 Jun 2023 07:21  Mg     2.0     06-26    TPro  6.0  /  Alb  3.1<L>  /  TBili  0.3  /  DBili  x   /  AST  33  /  ALT  35  /  AlkPhos  135<H>  06-26      Urinalysis Basic - ( 26 Jun 2023 07:21 )    Color: x / Appearance: x / SG: x / pH: x  Gluc: 266 mg/dL / Ketone: x  / Bili: x / Urobili: x   Blood: x / Protein: x / Nitrite: x   Leuk Esterase: x / RBC: x / WBC x   Sq Epi: x / Non Sq Epi: x / Bacteria: x            Culture - Surgical Swab (collected 23 Jun 2023 14:02)  Source: .Surgical Swab None  Preliminary Report (25 Jun 2023 14:03):    Rare Pseudomonas aeruginosa    Few Staphylococcus aureus  Organism: Pseudomonas aeruginosa (26 Jun 2023 08:26)  Organism: Pseudomonas aeruginosa (26 Jun 2023 08:26)    Culture - Fungal, Other (collected 23 Jun 2023 14:02)  Source: .Other None  Preliminary Report (26 Jun 2023 09:07):    Testing in progress    Culture - Acid Fast - Other w/Smear (collected 23 Jun 2023 14:02)  Source: .Other None              PHYSICAL EXAM:  GEN: No acute distress  LUNGS: Clear to auscultation bilaterally   HEART: S1/S2 present.    ABD: Soft, non-tender, non-distended.   EXT: RLE 1+ pitting edema. RLE wrapped.  NEURO: AAOX3 SUBJECTIVE:    Patient is a 73y old Male who presents with a chief complaint of   Currently admitted to medicine with the primary diagnosis of Diabetic ulcer of right foot       Today is hospital day 5d.     HPI:  74 yo M w  PMH of DM  HLD  HTN  bipolar disorder  GERD  OM  with toe amputations was in by his podiatrist due to non healing ulceration to right foot.  Pt was recently admitted to [facility] 5/23 for debridement of the same ulcer.  Pt was seen in ER for similar sx 6/17, followed up with his podiatrist Dr Bhatia today who referred pt back to ER. As per podiatry's note the ulcer had no purulent discharge on 6/17, but today has malodorous purulent discharge.  At my encounter pt denies pain at the site and denies fever / NV / diarrhoea / CP.    In the ED, /102  Labs: lipase 107  XR foot:    Possible 1.1 cm gas containing collection seen abutting the first metatarsal transection (new compared to XR in 5/23)   No new cortical destruction to suggest osteomyelitis  art duplex 5/23: wnl     (22 Jun 2023 01:32)    Attending note: Pt seen and examined at bedside. No cp or sob.       PAST MEDICAL & SURGICAL HISTORY  DM (diabetes mellitus)  Type 2, 12/27/18 hgb aic  11.2    HTN (hypertension)    Dyslipidemia    Diabetic foot ulcer    Depression    GERD (gastroesophageal reflux disease)    Neuropathy, diabetic    Toe amputation status, right  (2008)    History of amputation of toe  LT -partial 1st toe        ALLERGIES:  No Known Allergies    MEDICATIONS:  ACTIVE MEDICATIONS  albuterol    90 MICROgram(s) HFA Inhaler 2 Puff(s) Inhalation every 6 hours PRN  aspirin  chewable 81 milliGRAM(s) Oral daily  budesonide  80 MICROgram(s)/formoterol 4.5 MICROgram(s) Inhaler 2 Puff(s) Inhalation two times a day  dextrose 50% Injectable 25 Gram(s) IV Push once  dextrose Oral Gel 15 Gram(s) Oral once PRN  enoxaparin Injectable 40 milliGRAM(s) SubCutaneous every 24 hours  ergocalciferol 41974 Unit(s) Oral daily  glucagon  Injectable 1 milliGRAM(s) IntraMuscular once  insulin glargine Injectable (LANTUS) 10 Unit(s) SubCutaneous at bedtime  insulin lispro (ADMELOG) corrective regimen sliding scale   SubCutaneous three times a day before meals  insulin lispro Injectable (ADMELOG) 3 Unit(s) SubCutaneous three times a day before meals  lacosamide 100 milliGRAM(s) Oral two times a day  levETIRAcetam 750 milliGRAM(s) Oral two times a day  pantoprazole    Tablet 40 milliGRAM(s) Oral before breakfast  piperacillin/tazobactam IVPB.. 3.375 Gram(s) IV Intermittent every 8 hours  prednisoLONE acetate 1% Suspension 1 Drop(s) Right EYE every 6 hours      VITALS:   T(F): 96.7  HR: 67  BP: 120/57  RR: 18  SpO2: --    LABS:                        11.7   5.85  )-----------( 291      ( 26 Jun 2023 07:21 )             36.9     06-26    138  |  105  |  14  ----------------------------<  266<H>  5.0   |  23  |  1.0    Ca    8.8      26 Jun 2023 07:21  Mg     2.0     06-26    TPro  6.0  /  Alb  3.1<L>  /  TBili  0.3  /  DBili  x   /  AST  33  /  ALT  35  /  AlkPhos  135<H>  06-26      Urinalysis Basic - ( 26 Jun 2023 07:21 )    Color: x / Appearance: x / SG: x / pH: x  Gluc: 266 mg/dL / Ketone: x  / Bili: x / Urobili: x   Blood: x / Protein: x / Nitrite: x   Leuk Esterase: x / RBC: x / WBC x   Sq Epi: x / Non Sq Epi: x / Bacteria: x            Culture - Surgical Swab (collected 23 Jun 2023 14:02)  Source: .Surgical Swab None  Preliminary Report (25 Jun 2023 14:03):    Rare Pseudomonas aeruginosa    Few Staphylococcus aureus  Organism: Pseudomonas aeruginosa (26 Jun 2023 08:26)  Organism: Pseudomonas aeruginosa (26 Jun 2023 08:26)    Culture - Fungal, Other (collected 23 Jun 2023 14:02)  Source: .Other None  Preliminary Report (26 Jun 2023 09:07):    Testing in progress    Culture - Acid Fast - Other w/Smear (collected 23 Jun 2023 14:02)  Source: .Other None              PHYSICAL EXAM:  GEN: No acute distress  LUNGS: Clear to auscultation bilaterally   HEART: S1/S2 present.    ABD: Soft, non-tender, non-distended.   EXT: RLE 1+ pitting edema. RLE wrapped.  NEURO: AAOX3

## 2023-06-27 LAB
-  AMPICILLIN/SULBACTAM: SIGNIFICANT CHANGE UP
-  CEFAZOLIN: SIGNIFICANT CHANGE UP
-  CLINDAMYCIN: SIGNIFICANT CHANGE UP
-  ERYTHROMYCIN: SIGNIFICANT CHANGE UP
-  GENTAMICIN: SIGNIFICANT CHANGE UP
-  OXACILLIN: SIGNIFICANT CHANGE UP
-  PENICILLIN: SIGNIFICANT CHANGE UP
-  RIFAMPIN: SIGNIFICANT CHANGE UP
-  TETRACYCLINE: SIGNIFICANT CHANGE UP
-  TRIMETHOPRIM/SULFAMETHOXAZOLE: SIGNIFICANT CHANGE UP
-  VANCOMYCIN: SIGNIFICANT CHANGE UP
CULTURE RESULTS: SIGNIFICANT CHANGE UP
GLUCOSE BLDC GLUCOMTR-MCNC: 198 MG/DL — HIGH (ref 70–99)
GLUCOSE BLDC GLUCOMTR-MCNC: 272 MG/DL — HIGH (ref 70–99)
GLUCOSE BLDC GLUCOMTR-MCNC: 278 MG/DL — HIGH (ref 70–99)
GLUCOSE BLDC GLUCOMTR-MCNC: 299 MG/DL — HIGH (ref 70–99)
METHOD TYPE: SIGNIFICANT CHANGE UP
ORGANISM # SPEC MICROSCOPIC CNT: SIGNIFICANT CHANGE UP
SPECIMEN SOURCE: SIGNIFICANT CHANGE UP
SURGICAL PATHOLOGY STUDY: SIGNIFICANT CHANGE UP

## 2023-06-27 PROCEDURE — 74177 CT ABD & PELVIS W/CONTRAST: CPT | Mod: 26

## 2023-06-27 PROCEDURE — 99232 SBSQ HOSP IP/OBS MODERATE 35: CPT

## 2023-06-27 RX ORDER — INSULIN GLARGINE 100 [IU]/ML
20 INJECTION, SOLUTION SUBCUTANEOUS AT BEDTIME
Refills: 0 | Status: DISCONTINUED | OUTPATIENT
Start: 2023-06-27 | End: 2023-06-28

## 2023-06-27 RX ORDER — INSULIN GLARGINE 100 [IU]/ML
15 INJECTION, SOLUTION SUBCUTANEOUS AT BEDTIME
Refills: 0 | Status: DISCONTINUED | OUTPATIENT
Start: 2023-06-27 | End: 2023-06-27

## 2023-06-27 RX ORDER — INSULIN LISPRO 100/ML
7 VIAL (ML) SUBCUTANEOUS
Refills: 0 | Status: DISCONTINUED | OUTPATIENT
Start: 2023-06-27 | End: 2023-06-28

## 2023-06-27 RX ORDER — INSULIN LISPRO 100/ML
5 VIAL (ML) SUBCUTANEOUS
Refills: 0 | Status: DISCONTINUED | OUTPATIENT
Start: 2023-06-27 | End: 2023-06-27

## 2023-06-27 RX ADMIN — Medication 3: at 08:12

## 2023-06-27 RX ADMIN — Medication 200 MILLIGRAM(S): at 17:48

## 2023-06-27 RX ADMIN — Medication 100 MILLIGRAM(S): at 05:23

## 2023-06-27 RX ADMIN — Medication 3: at 11:51

## 2023-06-27 RX ADMIN — ERGOCALCIFEROL 50000 UNIT(S): 1.25 CAPSULE ORAL at 11:52

## 2023-06-27 RX ADMIN — Medication 7 UNIT(S): at 17:42

## 2023-06-27 RX ADMIN — Medication 1 DROP(S): at 11:52

## 2023-06-27 RX ADMIN — BUDESONIDE AND FORMOTEROL FUMARATE DIHYDRATE 2 PUFF(S): 160; 4.5 AEROSOL RESPIRATORY (INHALATION) at 08:13

## 2023-06-27 RX ADMIN — Medication 1 DROP(S): at 17:46

## 2023-06-27 RX ADMIN — Medication 3 UNIT(S): at 08:11

## 2023-06-27 RX ADMIN — Medication 200 MILLIGRAM(S): at 05:28

## 2023-06-27 RX ADMIN — Medication 100 MILLIGRAM(S): at 21:33

## 2023-06-27 RX ADMIN — Medication 1 DROP(S): at 23:14

## 2023-06-27 RX ADMIN — LACOSAMIDE 100 MILLIGRAM(S): 50 TABLET ORAL at 05:27

## 2023-06-27 RX ADMIN — SENNA PLUS 2 TABLET(S): 8.6 TABLET ORAL at 21:33

## 2023-06-27 RX ADMIN — LACOSAMIDE 100 MILLIGRAM(S): 50 TABLET ORAL at 17:46

## 2023-06-27 RX ADMIN — Medication 81 MILLIGRAM(S): at 11:52

## 2023-06-27 RX ADMIN — Medication 1 DROP(S): at 05:23

## 2023-06-27 RX ADMIN — Medication 100 MILLIGRAM(S): at 15:52

## 2023-06-27 RX ADMIN — LEVETIRACETAM 750 MILLIGRAM(S): 250 TABLET, FILM COATED ORAL at 05:27

## 2023-06-27 RX ADMIN — INSULIN GLARGINE 20 UNIT(S): 100 INJECTION, SOLUTION SUBCUTANEOUS at 21:34

## 2023-06-27 RX ADMIN — PANTOPRAZOLE SODIUM 40 MILLIGRAM(S): 20 TABLET, DELAYED RELEASE ORAL at 05:28

## 2023-06-27 RX ADMIN — LEVETIRACETAM 750 MILLIGRAM(S): 250 TABLET, FILM COATED ORAL at 17:46

## 2023-06-27 RX ADMIN — Medication 1: at 17:42

## 2023-06-27 RX ADMIN — Medication 100 MILLIGRAM(S): at 14:18

## 2023-06-27 RX ADMIN — ENOXAPARIN SODIUM 40 MILLIGRAM(S): 100 INJECTION SUBCUTANEOUS at 08:12

## 2023-06-27 RX ADMIN — Medication 100 MILLIGRAM(S): at 22:02

## 2023-06-27 RX ADMIN — Medication 100 MILLIGRAM(S): at 05:24

## 2023-06-27 NOTE — PROGRESS NOTE ADULT - SUBJECTIVE AND OBJECTIVE BOX
JOSÉ MANUEL PORTER 73y Male      Patient is a 73y old  Male who presents with a chief complaint of       INTERVAL HPI/OVERNIGHT EVENTS: No acute events overnight. Patient was seen and evaluated at the bedside. The patient denies pain. Vitals stable. Patient denies fever/chills, chest pain, shortness of breath, abdominal pain, headaches, nausea/vomiting, and diarrhea/constipation.      PHYSICAL EXAM:  GENERAL: NAD  HEAD:  Normocephalic  EYES:  conjunctiva and sclera clear  ENMT: Moist mucous membranes  NECK: Supple  NERVOUS SYSTEM:  Alert, awake  CHEST/LUNG: Good air exchange bilaterally, no wheeze  HEART: Regular rate and rhythm        Vital Signs Last 24 Hrs  T(C): 36.7 (27 Jun 2023 05:02), Max: 36.7 (27 Jun 2023 05:02)  T(F): 98 (27 Jun 2023 05:02), Max: 98 (27 Jun 2023 05:02)  HR: 69 (27 Jun 2023 05:02) (69 - 77)  BP: 171/87 (27 Jun 2023 05:02) (155/70 - 176/80)  BP(mean): --  RR: 18 (27 Jun 2023 05:02) (18 - 20)  SpO2: --          Consultant(s) Notes Reviewed:  [X] YES  [ ] NO  Care Discussed with Consultants/Other Providers [X] YES  [ ] NO  Imaging Personally Reviewed:  [X] YES  [ ] NO      LABS:                        11.7   5.85  )-----------( 291      ( 26 Jun 2023 07:21 )             36.9     06-26    138  |  105  |  14  ----------------------------<  266<H>  5.0   |  23  |  1.0    Ca    8.8      26 Jun 2023 07:21  Mg     2.0     06-26    TPro  6.0  /  Alb  3.1<L>  /  TBili  0.3  /  DBili  x   /  AST  33  /  ALT  35  /  AlkPhos  135<H>  06-26      Urinalysis Basic - ( 26 Jun 2023 07:21 )    Color: x / Appearance: x / SG: x / pH: x  Gluc: 266 mg/dL / Ketone: x  / Bili: x / Urobili: x   Blood: x / Protein: x / Nitrite: x   Leuk Esterase: x / RBC: x / WBC x   Sq Epi: x / Non Sq Epi: x / Bacteria: x        CAPILLARY BLOOD GLUCOSE      POCT Blood Glucose.: 272 mg/dL (27 Jun 2023 07:46)  POCT Blood Glucose.: 280 mg/dL (26 Jun 2023 20:52)  POCT Blood Glucose.: 309 mg/dL (26 Jun 2023 16:46)  POCT Blood Glucose.: 283 mg/dL (26 Jun 2023 11:37)

## 2023-06-27 NOTE — PROGRESS NOTE ADULT - ASSESSMENT
72 yo M w  PMH of DM, HLD, HTN, bipolar disorder, GERD, OM with toe amputations was sent in by his podiatrist due to non healing ulceration to right foot.     #Right infected diabetic foot ulcer complicated by an abscess s/p drainage and debridement by podiatry          - Right foot x-ray: Possible 1.1 cm gas containing collection seen abutting the first metatarsal transection (new compared to XR in 5/23)  - ESR: 46, Arterial duplex: WNL   - podiatry: Excisional debridement of soft tissue (6/23)  - Blood cx: MSSA/Strep  - Wound cx: MSSA/Pseudomona/Kleb  - s/p Zosyn  - ID recs noted 6/26: Ancef 2 gm iv q8h Flagyl 500 mg iv q8h and Ciprofloxacin 40o mg iv q12h  - Daily bcx until repeatedly -ve   - f/u TTE (ro IE)  - f/u LE duplex  /26 results   - [facility] Podiatry, pt restarted on dvt ppx    #Elevated Alk phos- improved     #DMT2- c/w Basal Bolus adjusted from 10/3/3/3 to 15/5/5/5 6/27  #Asthma- not in exacerbation- c/w inhalers  #Seizure Disorder- c/w home Keppra 750mg BID, home lacosamide 100mg BID  #HO Bipolar Depression- Stable- Monitor off meds  #GERD- cw Proton Pump Inhibitor    DVT prophylaxis: Lovenox  GI prophylaxis: Protonix  Diet: DASH/CC  Code status: Full  Activity: IAT

## 2023-06-27 NOTE — PROGRESS NOTE ADULT - TIME BILLING
#Progress Note Handoff  Pending (specify):  echo and follow up blood culture, follow uP ID note today   Family discussion: house staff updated pt family  Disposition: home PT, AM-PAC score 21/24   Decision to admit the pt is based on acuity as above       time spent on review of labs, imaging studies, old records, obtaining history, personally examining patient, counselling and communicating with patient, entering orders for medications/tests/etc, discussions with other health care providers, documentation in electronic health records, independent interpretation of labs, imaging/procedure results and care coordination.
Counseled patient about diagnostic testing and treatment plan. All questions answered. Abnormal lab/radiographical/microbiological data reviewed.

## 2023-06-27 NOTE — PROGRESS NOTE ADULT - ASSESSMENT
· Assessment	  74 yo M w  PMH of DM  HLD  HTN  bipolar disorder  GERD  OM  with toe amputations was in by his podiatrist due to non healing ulceration to right foot.  Pt was recently admitted to [facility] 5/23 for debridement of the same ulcer.  Pt was seen in ER for similar sx 6/17, followed up with his podiatrist Dr Bhatia today who referred pt back to ER. As per podiatry's note the ulcer had no purulent discharge on 6/17, but today has malodorous purulent discharge.      XR foot: Possible 1.1 cm gas containing collection seen abutting the first metatarsal transection (new compared to XR in 5/23). No new cortical destruction to suggest osteomyelitis  Art duplex 5/23: wnl    All recent and past cultures reviewed.     IMPRESSION/RECOMMENDATIONS  Resolved abscess right foot first met with associated chronic OM  6/21 BCx JACOB, Strep ( did not grow )  Etiology of the JACOB bacteremia is the infection right foot  6/25 BCx NG  ECHO no vegetations  No evidence of endocarditis  6/21 WCx JACOB, P aeruginosa, Klebsiella  6/23 WCx JACOB, P aeruginosa, S lugdunensis  6/21 ESR/CRP 46/77.3  5/18 Abscess: Strep mitis, E coli, Bacteroides    -Ancef 2 gm iv q8h > change to Rocephin 2 gm iv q24h till 7/9  -flagyl 500 mg iv q8h> change to po 500 mg q8h till 7/9  -Ciprofloxacin 40o mg iv q12h > change now to po Ciprofloxacin 750 mg q12h till 8/7  -on 7/9 change rocephin and flagyl to Augmentin 875 mg q12h till 8/7.    Please do not hesitate to recall ID if any questions arise either through my [id number] or through [employer]

## 2023-06-27 NOTE — PROGRESS NOTE ADULT - ASSESSMENT
74 yo M w  PMH of DM  HLD  HTN  bipolar disorder  GERD  OM  with toe amputations was in by his podiatrist due to non healing ulceration to right foot.  Pt was recently admitted to [facility] 5/23 for debridement of the same ulcer.  Pt was seen in ER for similar sx 6/17, followed up with his podiatrist Dr Bhatia who referred pt back to ER as he was having purulent drainage, currently is s/p debridement found to have staph bacteremia also found in wound.  Had TTE which was negative for vegetation, on IV abx    Assessment  ·	Right foot first metatarsal DFU with abscess with chronic OM s/p debridement  ·	MSSA bacteremia, TTE negative  ·	DM  ·	HTN  ·	Bipolar Disorder  ·	Epilepsy    Plan  ·	wound growing pseudomonas and MSSA, c/w cipro/flagyl/cefazolin as per ID, wound dressings as per podiatry  ·	TTE negative, f/u with ID for possible LILI, one blood culture negative post abx so far, f/u culture from yesterday  ·	increasing lantus to 20 and lipro to 7 tid  ·	c/w home keppra and vimpat    Pending: ID f/u on possible LILI and antibiotic course pending second negative blood culture    # DVT PPX: lovenox  # GI PPX: protonix  # Diet: carb consistent  FULL CODE

## 2023-06-27 NOTE — PROGRESS NOTE ADULT - GASTROINTESTINAL
soft/nontender/no guarding/no rigidity
normal/soft/nontender/nondistended/normal active bowel sounds
normal/soft/nontender/nondistended/normal active bowel sounds

## 2023-06-27 NOTE — PROGRESS NOTE ADULT - SUBJECTIVE AND OBJECTIVE BOX
SUBJECTIVE:    Patient is a 73y old Male who presents with a chief complaint of     HPI:  74 yo M w  PMH of DM  HLD  HTN  bipolar disorder  GERD  OM  with toe amputations was in by his podiatrist due to non healing ulceration to right foot.  Pt was recently admitted to [facility] 5/23 for debridement of the same ulcer.  Pt was seen in ER for similar sx 6/17, followed up with his podiatrist Dr Bhatia today who referred pt back to ER. As per podiatry's note the ulcer had no purulent discharge on 6/17, but today has malodorous purulent discharge.  At my encounter pt denies pain at the site and denies fever / NV / diarrhoea / CP.    In the ED, /102  Labs: lipase 107  XR foot:    Possible 1.1 cm gas containing collection seen abutting the first metatarsal transection (new compared to XR in 5/23)   No new cortical destruction to suggest osteomyelitis  art duplex 5/23: wnl     (22 Jun 2023 01:32)      Currently admitted to medicine with the primary diagnosis of Diabetic ulcer of right foot    pain in leg is improving, no other complaints    Besides the pertinent positives and negatives described above, the ROS was within normal limits.    PAST MEDICAL & SURGICAL HISTORY  DM (diabetes mellitus)  Type 2, 12/27/18 hgb aic  11.2    HTN (hypertension)    Dyslipidemia    Diabetic foot ulcer    Depression    GERD (gastroesophageal reflux disease)    Neuropathy, diabetic    Toe amputation status, right  (2008)    History of amputation of toe  LT -partial 1st toe      SOCIAL HISTORY:    ALLERGIES:  No Known Allergies    MEDICATIONS:  STANDING MEDICATIONS  aspirin  chewable 81 milliGRAM(s) Oral daily  budesonide  80 MICROgram(s)/formoterol 4.5 MICROgram(s) Inhaler 2 Puff(s) Inhalation two times a day  ceFAZolin   IVPB 2000 milliGRAM(s) IV Intermittent every 8 hours  ciprofloxacin   IVPB 400 milliGRAM(s) IV Intermittent every 12 hours  dextrose 50% Injectable 25 Gram(s) IV Push once  enoxaparin Injectable 40 milliGRAM(s) SubCutaneous every 24 hours  ergocalciferol 23902 Unit(s) Oral daily  glucagon  Injectable 1 milliGRAM(s) IntraMuscular once  insulin glargine Injectable (LANTUS) 15 Unit(s) SubCutaneous at bedtime  insulin lispro (ADMELOG) corrective regimen sliding scale   SubCutaneous three times a day before meals  insulin lispro Injectable (ADMELOG) 5 Unit(s) SubCutaneous three times a day before meals  lacosamide 100 milliGRAM(s) Oral two times a day  levETIRAcetam 750 milliGRAM(s) Oral two times a day  metroNIDAZOLE  IVPB 500 milliGRAM(s) IV Intermittent every 8 hours  pantoprazole    Tablet 40 milliGRAM(s) Oral before breakfast  prednisoLONE acetate 1% Suspension 1 Drop(s) Right EYE every 6 hours  senna 2 Tablet(s) Oral at bedtime    PRN MEDICATIONS  albuterol    90 MICROgram(s) HFA Inhaler 2 Puff(s) Inhalation every 6 hours PRN  dextrose Oral Gel 15 Gram(s) Oral once PRN  polyethylene glycol 3350 17 Gram(s) Oral daily PRN    VITALS:   T(F): 98  HR: 69  BP: 171/87  RR: 18  SpO2: --    LABS:                        11.7   5.85  )-----------( 291      ( 26 Jun 2023 07:21 )             36.9     06-26    138  |  105  |  14  ----------------------------<  266<H>  5.0   |  23  |  1.0    Ca    8.8      26 Jun 2023 07:21  Mg     2.0     06-26    TPro  6.0  /  Alb  3.1<L>  /  TBili  0.3  /  DBili  x   /  AST  33  /  ALT  35  /  AlkPhos  135<H>  06-26      Urinalysis Basic - ( 26 Jun 2023 07:21 )    Color: x / Appearance: x / SG: x / pH: x  Gluc: 266 mg/dL / Ketone: x  / Bili: x / Urobili: x   Blood: x / Protein: x / Nitrite: x   Leuk Esterase: x / RBC: x / WBC x   Sq Epi: x / Non Sq Epi: x / Bacteria: x            Culture - Blood (collected 25 Jun 2023 08:16)  Source: .Blood None  Preliminary Report (26 Jun 2023 22:02):    No growth to date.          Diabetic ulcer of right foot      RADIOLOGY:    PHYSICAL EXAM:  General: WN/WD NAD  Neurology: A&Ox3, nonfocal, [place] x 4  Head:  Normocephalic, atraumatic  ENT:  Mucosa moist, no ulcerations  Neck:  Supple, no sinuses or palpable masses  Lymphatic:  No palpable cervical, supraclavicular, axillary or inguinal adenopathy  Respiratory: CTA B/L  CV: RRR, S1S2, no murmur  Abdominal: Soft, NT, ND no palpable mass  MSK: right foot debridement, wrapped  Incisions: intact, no erythema or drainage    Intravenous access: yes  NG tube: no  Basilio Catheter: no

## 2023-06-27 NOTE — PROGRESS NOTE ADULT - SUBJECTIVE AND OBJECTIVE BOX
JOSÉ MANUEL PORTER  73y, Male    All available historical data reviewed    OVERNIGHT EVENTS:  feels well and has no new complaints  No fevers     ROS:  General: Denies rigors, nightsweats  HEENT: Denies headache, rhinorrhea, sore throat, eye pain  CV: Denies CP, palpitations  PULM: Denies wheezing, hemoptysis  GI: Denies hematemesis, hematochezia, melena  : Denies discharge, hematuria  MSK: Denies arthralgias, myalgias  SKIN: Denies rash, lesions  NEURO: Denies paresthesias, weakness  PSYCH: Denies depression, anxiety    VITALS:  T(F): 96, Max: 98 (06-27-23 @ 05:02)  HR: 68  BP: 158/78  RR: 20Vital Signs Last 24 Hrs  T(C): 35.6 (27 Jun 2023 13:13), Max: 36.7 (27 Jun 2023 05:02)  T(F): 96 (27 Jun 2023 13:13), Max: 98 (27 Jun 2023 05:02)  HR: 68 (27 Jun 2023 13:13) (68 - 77)  BP: 158/78 (27 Jun 2023 13:13) (158/78 - 176/80)  BP(mean): --  RR: 20 (27 Jun 2023 13:13) (18 - 20)  SpO2: --        TESTS & MEASUREMENTS:                        11.7   5.85  )-----------( 291      ( 26 Jun 2023 07:21 )             36.9     06-26    138  |  105  |  14  ----------------------------<  266<H>  5.0   |  23  |  1.0    Ca    8.8      26 Jun 2023 07:21  Mg     2.0     06-26    TPro  6.0  /  Alb  3.1<L>  /  TBili  0.3  /  DBili  x   /  AST  33  /  ALT  35  /  AlkPhos  135<H>  06-26    LIVER FUNCTIONS - ( 26 Jun 2023 07:21 )  Alb: 3.1 g/dL / Pro: 6.0 g/dL / ALK PHOS: 135 U/L / ALT: 35 U/L / AST: 33 U/L / GGT: x             Culture - Blood (collected 06-25-23 @ 08:16)  Source: .Blood None  Preliminary Report (06-26-23 @ 22:02):    No growth to date.    Culture - Fungal, Other (collected 06-23-23 @ 14:02)  Source: .Other None  Preliminary Report (06-26-23 @ 09:07):    Testing in progress    Culture - Surgical Swab (collected 06-23-23 @ 14:02)  Source: .Surgical Swab None  Preliminary Report (06-27-23 @ 09:19):    Rare Pseudomonas aeruginosa    Few Staphylococcus aureus    Few Staphylococcus lugdunensis  Organism: Pseudomonas aeruginosa  Staphylococcus aureus  Staphylococcus lugdunensis (06-27-23 @ 09:19)  Organism: Staphylococcus lugdunensis (06-27-23 @ 09:19)      -  Cefazolin: R <=4      -  Clindamycin: S <=0.25      Method Type: LINDSEY      -  Ampicillin/Sulbactam: R <=8/4      -  Erythromycin: S <=0.25      -  Gentamicin: S <=1 Should not be used as monotherapy      -  Oxacillin: R >2      -  Penicillin: R >8      -  Rifampin: S <=1 Should not be used as monotherapy      -  Tetracycline: S <=1      -  Trimethoprim/Sulfamethoxazole: S <=0.5/9.5      -  Vancomycin: S 1  Organism: Staphylococcus aureus (06-26-23 @ 11:46)      Method Type: LINDSEY      -  Ampicillin/Sulbactam: S <=8/4      -  Cefazolin: S <=4      -  Clindamycin: S <=0.25      -  Erythromycin: R >4      -  Gentamicin: S <=1 Should not be used as monotherapy      -  Oxacillin: S <=0.25 Oxacillin predicts susceptibility for dicloxacillin, methicillin, and nafcillin      -  Penicillin: R >8      -  Rifampin: S <=1 Should not be used as monotherapy      -  Tetracycline: S <=1      -  Trimethoprim/Sulfamethoxazole: S <=0.5/9.5      -  Vancomycin: S 2  Organism: Pseudomonas aeruginosa (06-26-23 @ 08:26)      Method Type: LINDSEY      -  Amikacin: S <=16      -  Aztreonam: S <=4      -  Cefepime: S <=2      -  Ceftazidime: S <=1      -  Ciprofloxacin: S <=0.25      -  Gentamicin: S <=2      -  Imipenem: S <=1      -  Levofloxacin: S <=0.5      -  Meropenem: S <=1      -  Piperacillin/Tazobactam: S <=8      -  Tobramycin: S <=2    Culture - Acid Fast - Other w/Smear (collected 06-23-23 @ 14:02)  Source: .Other None    Culture - Abscess with Gram Stain (collected 06-21-23 @ 19:50)  Source: .Abscess foot  Gram Stain (06-22-23 @ 04:54):    Moderate polymorphonuclear leukocytes seen per low power field    Moderate Gram positive cocci in pairs seen per oil power field    Rare Gram Negative Rods seen per oil power field  Final Report (06-27-23 @ 11:02):    Numerous Pseudomonas aeruginosa    Numerous Staphylococcus aureus    Few Klebsiella pneumoniae    Moderate Corynebacterium striatum group "Susceptibilities not performed"  Organism: Pseudomonas aeruginosa  Staphylococcus aureus  Klebsiella pneumoniae (06-27-23 @ 11:02)  Organism: Klebsiella pneumoniae (06-27-23 @ 11:02)      Method Type: LINDSEY      -  Amikacin: S <=16      -  Amoxicillin/Clavulanic Acid: S <=8/4      -  Ampicillin: R >16 These ampicillin results predict results for amoxicillin      -  Ampicillin/Sulbactam: S <=4/2 Enterobacter, Klebsiella aerogenes, Citrobacter, and Serratia may develop resistance during prolonged therapy (3-4 days)      -  Aztreonam: S <=4      -  Cefazolin: S <=2 Enterobacter, Klebsiella aerogenes, Citrobacter, and Serratia may develop resistance during prolonged therapy (3-4 days)      -  Cefepime: S <=2      -  Cefoxitin: S <=8      -  Ceftriaxone: S <=1 Enterobacter, Klebsiella aerogenes, Citrobacter, and Serratia may develop resistance during prolonged therapy      -  Ciprofloxacin: S <=0.25      -  Ertapenem: S <=0.5      -  Gentamicin: S <=2      -  Imipenem: S <=1      -  Levofloxacin: S <=0.5      -  Meropenem: S <=1      -  Piperacillin/Tazobactam: S <=8      -  Tobramycin: S <=2      -  Trimethoprim/Sulfamethoxazole: S <=0.5/9.5  Organism: Staphylococcus aureus (06-27-23 @ 11:02)      Method Type: LINDSEY      -  Ampicillin/Sulbactam: S <=8/4      -  Cefazolin: S <=4      -  Clindamycin: S <=0.25      -  Erythromycin: R >4      -  Gentamicin: S <=1 Should not be used as monotherapy      -  Oxacillin: S <=0.25 Oxacillin predicts susceptibility for dicloxacillin, methicillin, and nafcillin      -  Penicillin: R 4      -  Rifampin: S <=1 Should not be used as monotherapy      -  Tetracycline: S <=1      -  Trimethoprim/Sulfamethoxazole: S <=0.5/9.5      -  Vancomycin: S 2  Organism: Pseudomonas aeruginosa (06-27-23 @ 11:02)      Method Type: LINDSEY      -  Amikacin: S <=16      -  Aztreonam: I 16      -  Cefepime: S 4      -  Ceftazidime: S <=1      -  Ciprofloxacin: S <=0.25      -  Gentamicin: S <=2      -  Imipenem: S <=1      -  Levofloxacin: S <=0.5      -  Meropenem: S <=1      -  Piperacillin/Tazobactam: S <=8      -  Tobramycin: S <=2    Culture - Blood (collected 06-21-23 @ 15:36)  Source: .Blood Blood  Gram Stain (06-22-23 @ 15:30):    Growth in aerobic bottle: Gram Positive Cocci in Clusters    Growth in anaerobic bottle: Gram Positive Cocci in Clusters  Final Report (06-24-23 @ 11:52):    Growth in aerobic and anaerobic bottles: Staphylococcus aureus  Organism: Staphylococcus aureus (06-24-23 @ 11:52)  Organism: Staphylococcus aureus (06-24-23 @ 11:52)      Method Type: LINDSEY      -  Ampicillin/Sulbactam: S <=8/4      -  Cefazolin: S <=4      -  Clindamycin: S <=0.25      -  Erythromycin: R >4      -  Gentamicin: S <=1 Should not be used as monotherapy      -  Oxacillin: S <=0.25 Oxacillin predicts susceptibility for dicloxacillin, methicillin, and nafcillin      -  Penicillin: R >8      -  Rifampin: S <=1 Should not be used as monotherapy      -  Tetracycline: S <=1      -  Trimethoprim/Sulfamethoxazole: S <=0.5/9.5      -  Vancomycin: S 2    Culture - Blood (collected 06-21-23 @ 15:36)  Source: .Blood Blood  Gram Stain (06-22-23 @ 12:23):    Growth in aerobic bottle: Gram Positive Cocci in Clusters and Gram    Positive Cocci in Pairs and Chains    Growth in anaerobic bottle: Gram Positive Cocci in Pairs and Chains  Final Report (06-24-23 @ 12:13):    Growth in aerobic and anaerobic bottles: Staphylococcus aureus    Growth in aerobic and anaerobic bottles: Gram Positive Cocci in Pairs and    Chains Most closely resembling Alpha hemolytic strep "Susceptibilities    not performed"    ***Blood Panel PCR results on this specimen are available    approximately 3 hours after the Gram stain result.***    Gram stain, PCR, and/or culture results may not always    correspond due to difference in methodologies.    ************************************************************    This PCR assay was performed by multiplex PCR. This    Assay tests for 66 bacterial and resistance gene targets.    Please refer to the [facility] test directory    at [email or web address] for details.  Organism: Blood Culture PCR  Staphylococcus aureus (06-24-23 @ 12:13)  Organism: Staphylococcus aureus (06-24-23 @ 12:13)      Method Type: LINDSEY      -  Ampicillin/Sulbactam: S <=8/4      -  Cefazolin: S <=4      -  Clindamycin: S <=0.25      -  Erythromycin: R >4      -  Gentamicin: S <=1 Should not be used as monotherapy      -  Oxacillin: S <=0.25 Oxacillin predicts susceptibility for dicloxacillin, methicillin, and nafcillin      -  Penicillin: R 8      -  Rifampin: S <=1 Should not be used as monotherapy      -  Tetracycline: S <=1      -  Trimethoprim/Sulfamethoxazole: S <=0.5/9.5      -  Vancomycin: S 2  Organism: Blood Culture PCR (06-24-23 @ 12:13)      Method Type: PCR      -  Methicillin SENSITIVE Staphylococcus aureus (MSSA): Detec Any isolate of Staphylococcus aureus from a blood culture is NOT considered a contaminant.      -  Streptococcus sp. (Not Grp A, B or S pneumoniae): Detec      Urinalysis Basic - ( 26 Jun 2023 07:21 )    Color: x / Appearance: x / SG: x / pH: x  Gluc: 266 mg/dL / Ketone: x  / Bili: x / Urobili: x   Blood: x / Protein: x / Nitrite: x   Leuk Esterase: x / RBC: x / WBC x   Sq Epi: x / Non Sq Epi: x / Bacteria: x          RADIOLOGY & ADDITIONAL TESTS:  Personal review of radiological diagnostics performed  Echo and EKG results noted when applicable.     MEDICATIONS:  albuterol    90 MICROgram(s) HFA Inhaler 2 Puff(s) Inhalation every 6 hours PRN  aspirin  chewable 81 milliGRAM(s) Oral daily  budesonide  80 MICROgram(s)/formoterol 4.5 MICROgram(s) Inhaler 2 Puff(s) Inhalation two times a day  ceFAZolin   IVPB 2000 milliGRAM(s) IV Intermittent every 8 hours  ciprofloxacin   IVPB 400 milliGRAM(s) IV Intermittent every 12 hours  dextrose 50% Injectable 25 Gram(s) IV Push once  dextrose Oral Gel 15 Gram(s) Oral once PRN  enoxaparin Injectable 40 milliGRAM(s) SubCutaneous every 24 hours  ergocalciferol 58336 Unit(s) Oral daily  glucagon  Injectable 1 milliGRAM(s) IntraMuscular once  insulin glargine Injectable (LANTUS) 20 Unit(s) SubCutaneous at bedtime  insulin lispro (ADMELOG) corrective regimen sliding scale   SubCutaneous three times a day before meals  insulin lispro Injectable (ADMELOG) 7 Unit(s) SubCutaneous three times a day before meals  lacosamide 100 milliGRAM(s) Oral two times a day  levETIRAcetam 750 milliGRAM(s) Oral two times a day  metroNIDAZOLE  IVPB 500 milliGRAM(s) IV Intermittent every 8 hours  pantoprazole    Tablet 40 milliGRAM(s) Oral before breakfast  polyethylene glycol 3350 17 Gram(s) Oral daily PRN  prednisoLONE acetate 1% Suspension 1 Drop(s) Right EYE every 6 hours  senna 2 Tablet(s) Oral at bedtime      ANTIBIOTICS:  ceFAZolin   IVPB 2000 milliGRAM(s) IV Intermittent every 8 hours  ciprofloxacin   IVPB 400 milliGRAM(s) IV Intermittent every 12 hours  metroNIDAZOLE  IVPB 500 milliGRAM(s) IV Intermittent every 8 hours

## 2023-06-28 LAB
GLUCOSE BLDC GLUCOMTR-MCNC: 132 MG/DL — HIGH (ref 70–99)
GLUCOSE BLDC GLUCOMTR-MCNC: 133 MG/DL — HIGH (ref 70–99)
GLUCOSE BLDC GLUCOMTR-MCNC: 283 MG/DL — HIGH (ref 70–99)
GLUCOSE BLDC GLUCOMTR-MCNC: 362 MG/DL — HIGH (ref 70–99)

## 2023-06-28 PROCEDURE — 36000 PLACE NEEDLE IN VEIN: CPT

## 2023-06-28 PROCEDURE — 99232 SBSQ HOSP IP/OBS MODERATE 35: CPT

## 2023-06-28 RX ORDER — INSULIN GLARGINE 100 [IU]/ML
27 INJECTION, SOLUTION SUBCUTANEOUS AT BEDTIME
Refills: 0 | Status: DISCONTINUED | OUTPATIENT
Start: 2023-06-28 | End: 2023-06-30

## 2023-06-28 RX ORDER — CIPROFLOXACIN LACTATE 400MG/40ML
750 VIAL (ML) INTRAVENOUS EVERY 12 HOURS
Refills: 0 | Status: DISCONTINUED | OUTPATIENT
Start: 2023-06-28 | End: 2023-06-30

## 2023-06-28 RX ORDER — CEFTRIAXONE 500 MG/1
2000 INJECTION, POWDER, FOR SOLUTION INTRAMUSCULAR; INTRAVENOUS EVERY 24 HOURS
Refills: 0 | Status: DISCONTINUED | OUTPATIENT
Start: 2023-06-28 | End: 2023-06-30

## 2023-06-28 RX ORDER — CEFTRIAXONE 500 MG/1
2000 INJECTION, POWDER, FOR SOLUTION INTRAMUSCULAR; INTRAVENOUS
Qty: 0 | Refills: 0 | DISCHARGE
Start: 2023-06-28

## 2023-06-28 RX ORDER — METRONIDAZOLE 500 MG
500 TABLET ORAL EVERY 8 HOURS
Refills: 0 | Status: DISCONTINUED | OUTPATIENT
Start: 2023-06-28 | End: 2023-06-30

## 2023-06-28 RX ORDER — METRONIDAZOLE 500 MG
1 TABLET ORAL
Qty: 33 | Refills: 0
Start: 2023-06-28 | End: 2023-07-08

## 2023-06-28 RX ORDER — INSULIN LISPRO 100/ML
9 VIAL (ML) SUBCUTANEOUS
Refills: 0 | Status: DISCONTINUED | OUTPATIENT
Start: 2023-06-28 | End: 2023-06-30

## 2023-06-28 RX ORDER — CIPROFLOXACIN LACTATE 400MG/40ML
1 VIAL (ML) INTRAVENOUS
Qty: 80 | Refills: 0
Start: 2023-06-28 | End: 2023-08-06

## 2023-06-28 RX ADMIN — Medication 1 DROP(S): at 05:12

## 2023-06-28 RX ADMIN — PANTOPRAZOLE SODIUM 40 MILLIGRAM(S): 20 TABLET, DELAYED RELEASE ORAL at 05:18

## 2023-06-28 RX ADMIN — Medication 500 MILLIGRAM(S): at 14:24

## 2023-06-28 RX ADMIN — LEVETIRACETAM 750 MILLIGRAM(S): 250 TABLET, FILM COATED ORAL at 17:34

## 2023-06-28 RX ADMIN — Medication 200 MILLIGRAM(S): at 05:11

## 2023-06-28 RX ADMIN — Medication 100 MILLIGRAM(S): at 05:11

## 2023-06-28 RX ADMIN — Medication 1 DROP(S): at 17:34

## 2023-06-28 RX ADMIN — SENNA PLUS 2 TABLET(S): 8.6 TABLET ORAL at 21:32

## 2023-06-28 RX ADMIN — Medication 9 UNIT(S): at 17:35

## 2023-06-28 RX ADMIN — Medication 750 MILLIGRAM(S): at 17:34

## 2023-06-28 RX ADMIN — Medication 500 MILLIGRAM(S): at 21:32

## 2023-06-28 RX ADMIN — Medication 3: at 12:16

## 2023-06-28 RX ADMIN — Medication 81 MILLIGRAM(S): at 12:18

## 2023-06-28 RX ADMIN — LEVETIRACETAM 750 MILLIGRAM(S): 250 TABLET, FILM COATED ORAL at 05:18

## 2023-06-28 RX ADMIN — Medication 7 UNIT(S): at 08:16

## 2023-06-28 RX ADMIN — LACOSAMIDE 100 MILLIGRAM(S): 50 TABLET ORAL at 17:33

## 2023-06-28 RX ADMIN — CEFTRIAXONE 100 MILLIGRAM(S): 500 INJECTION, POWDER, FOR SOLUTION INTRAMUSCULAR; INTRAVENOUS at 12:17

## 2023-06-28 RX ADMIN — Medication 1 DROP(S): at 23:35

## 2023-06-28 RX ADMIN — Medication 5: at 08:15

## 2023-06-28 RX ADMIN — Medication 1 DROP(S): at 12:18

## 2023-06-28 RX ADMIN — LACOSAMIDE 100 MILLIGRAM(S): 50 TABLET ORAL at 05:17

## 2023-06-28 RX ADMIN — ENOXAPARIN SODIUM 40 MILLIGRAM(S): 100 INJECTION SUBCUTANEOUS at 08:16

## 2023-06-28 RX ADMIN — ERGOCALCIFEROL 50000 UNIT(S): 1.25 CAPSULE ORAL at 12:18

## 2023-06-28 RX ADMIN — INSULIN GLARGINE 27 UNIT(S): 100 INJECTION, SOLUTION SUBCUTANEOUS at 21:31

## 2023-06-28 RX ADMIN — Medication 9 UNIT(S): at 12:16

## 2023-06-28 NOTE — PROGRESS NOTE ADULT - SUBJECTIVE AND OBJECTIVE BOX
JOSÉ MANUEL PORTER  73y Male    CHIEF COMPLAINT:    Patient is a 73y old  Male who presents with a chief complaint of DFU    INTERVAL HPI/OVERNIGHT EVENTS:    Patient seen and examined. No acute events overnight. No active complaints     ROS: All other systems are negative.    Vital Signs:    T(F): 95.9 (06-28-23 @ 13:05), Max: 97.3 (06-28-23 @ 05:07)  HR: 80 (06-28-23 @ 13:05) (75 - 80)  BP: 120/58 (06-28-23 @ 13:05) (107/55 - 120/58)  RR: 18 (06-28-23 @ 13:05) (18 - 18)    POCT Blood Glucose.: 132 mg/dL (28 Jun 2023 17:09)  POCT Blood Glucose.: 283 mg/dL (28 Jun 2023 11:30)  POCT Blood Glucose.: 362 mg/dL (28 Jun 2023 07:51)  POCT Blood Glucose.: 278 mg/dL (27 Jun 2023 21:21)  POCT Blood Glucose.: 198 mg/dL (27 Jun 2023 17:21)    PHYSICAL EXAM:    GENERAL:  NAD  SKIN: No rashes or lesions  HEENT: Atraumatic. Normocephalic.   NECK: Supple, No JVD.   PULMONARY: CTA B/L. No wheezing. No rales  CVS: Normal S1, S2. Rate and Rhythm are regular  ABDOMEN/GI: Soft, Nontender, Nondistended; BS present  MSK:  No clubbing or cyanosis  NEUROLOGIC: moves all extremities   PSYCH: Awake and alert    Consultant(s) Notes Reviewed:  [x ] YES  [ ] NO  Care Discussed with Consultants/Other Providers [ x] YES  [ ] NO    LABS:    Culture - Blood (collected 26 Jun 2023 07:21)  Source: .Blood None  Preliminary Report (27 Jun 2023 18:22):    No growth at 24 hours    RADIOLOGY & ADDITIONAL TESTS:  Imaging or report Personally Reviewed:  [x] YES  [ ] NO  EKG reviewed: [x] YES  [ ] NO    Medications:  Standing  aspirin  chewable 81 milliGRAM(s) Oral daily  budesonide  80 MICROgram(s)/formoterol 4.5 MICROgram(s) Inhaler 2 Puff(s) Inhalation two times a day  cefTRIAXone   IVPB 2000 milliGRAM(s) IV Intermittent every 24 hours  ciprofloxacin     Tablet 750 milliGRAM(s) Oral every 12 hours  dextrose 50% Injectable 25 Gram(s) IV Push once  enoxaparin Injectable 40 milliGRAM(s) SubCutaneous every 24 hours  glucagon  Injectable 1 milliGRAM(s) IntraMuscular once  insulin glargine Injectable (LANTUS) 27 Unit(s) SubCutaneous at bedtime  insulin lispro (ADMELOG) corrective regimen sliding scale   SubCutaneous three times a day before meals  insulin lispro Injectable (ADMELOG) 9 Unit(s) SubCutaneous three times a day before meals  lacosamide 100 milliGRAM(s) Oral two times a day  levETIRAcetam 750 milliGRAM(s) Oral two times a day  metroNIDAZOLE    Tablet 500 milliGRAM(s) Oral every 8 hours  pantoprazole    Tablet 40 milliGRAM(s) Oral before breakfast  prednisoLONE acetate 1% Suspension 1 Drop(s) Right EYE every 6 hours  senna 2 Tablet(s) Oral at bedtime    PRN Meds  albuterol    90 MICROgram(s) HFA Inhaler 2 Puff(s) Inhalation every 6 hours PRN  dextrose Oral Gel 15 Gram(s) Oral once PRN  polyethylene glycol 3350 17 Gram(s) Oral daily PRN

## 2023-06-28 NOTE — PROGRESS NOTE ADULT - ASSESSMENT
74 yo M w  PMH of DM  HLD  HTN  bipolar disorder  GERD  OM  with toe amputations was in by his podiatrist due to non healing ulceration to right foot.  Pt was recently admitted to [facility] 5/23 for debridement of the same ulcer.  Pt was seen in ER for similar sx 6/17, followed up with his podiatrist Dr Bhatia who referred pt back to ER as he was having purulent drainage, currently is s/p debridement found to have staph bacteremia also found in wound.  Had TTE which was negative for vegetation, on IV abx    Assessment  Right foot first metatarsal DFU with abscess with chronic OM s/p debridement  MSSA bacteremia, TTE negative  DM  HTN  Bipolar Disorder  Epilepsy    Plan  wound growing pseudomonas and MSSA, c/w Rocephin 2 gm iv q24h and Flagyl 500 PO Q8 till 7/9,Ciprofloxacin 750 mg PO q12h till 8/7  On 7/9, change Rocephin and Flagyl to Augmentin until 8/7  TTE negative, blood cx neg x2  monitor FS, titrate insulin up as needed based on FS   c/w home keppra and vimpat    Pending: home IV abx set up, anticipate dc tomorrow, patient in agreement

## 2023-06-28 NOTE — PROCEDURE NOTE - SUPERVISORY STATEMENT
Procedure team was consulted to perform an urgent midline procedure for the diagnosis of  diabetic foot ulcer. I was present for the key critical aspects of the procedure performed during the care of the patient. Utilizing ultrasound guidance,  a right upper extremity was inserted into the  right upper extremity vein. Prior to needle insertion, the patency of the vein was confirmed with ultrasound.  The representative images are stored on the [employer] application

## 2023-06-29 DIAGNOSIS — L03.115 CELLULITIS OF RIGHT LOWER LIMB: ICD-10-CM

## 2023-06-29 LAB
ALBUMIN SERPL ELPH-MCNC: 3.2 G/DL — LOW (ref 3.5–5.2)
ALP SERPL-CCNC: 128 U/L — HIGH (ref 30–115)
ALT FLD-CCNC: 17 U/L — SIGNIFICANT CHANGE UP (ref 0–41)
ANION GAP SERPL CALC-SCNC: 7 MMOL/L — SIGNIFICANT CHANGE UP (ref 7–14)
AST SERPL-CCNC: 22 U/L — SIGNIFICANT CHANGE UP (ref 0–41)
BACTERIA WND CULT: ABNORMAL
BACTERIA WND CULT: ABNORMAL
BASOPHILS # BLD AUTO: 0.05 K/UL — SIGNIFICANT CHANGE UP (ref 0–0.2)
BASOPHILS NFR BLD AUTO: 0.7 % — SIGNIFICANT CHANGE UP (ref 0–1)
BILIRUB SERPL-MCNC: <0.2 MG/DL — SIGNIFICANT CHANGE UP (ref 0.2–1.2)
BUN SERPL-MCNC: 18 MG/DL — SIGNIFICANT CHANGE UP (ref 10–20)
CALCIUM SERPL-MCNC: 8.7 MG/DL — SIGNIFICANT CHANGE UP (ref 8.4–10.4)
CHLORIDE SERPL-SCNC: 104 MMOL/L — SIGNIFICANT CHANGE UP (ref 98–110)
CO2 SERPL-SCNC: 26 MMOL/L — SIGNIFICANT CHANGE UP (ref 17–32)
CREAT SERPL-MCNC: 1.1 MG/DL — SIGNIFICANT CHANGE UP (ref 0.7–1.5)
CULTURE RESULTS: SIGNIFICANT CHANGE UP
EGFR: 70 ML/MIN/1.73M2 — SIGNIFICANT CHANGE UP
EOSINOPHIL # BLD AUTO: 0.07 K/UL — SIGNIFICANT CHANGE UP (ref 0–0.7)
EOSINOPHIL NFR BLD AUTO: 1 % — SIGNIFICANT CHANGE UP (ref 0–8)
GLUCOSE BLDC GLUCOMTR-MCNC: 198 MG/DL — HIGH (ref 70–99)
GLUCOSE BLDC GLUCOMTR-MCNC: 258 MG/DL — HIGH (ref 70–99)
GLUCOSE BLDC GLUCOMTR-MCNC: 286 MG/DL — HIGH (ref 70–99)
GLUCOSE BLDC GLUCOMTR-MCNC: 313 MG/DL — HIGH (ref 70–99)
GLUCOSE SERPL-MCNC: 216 MG/DL — HIGH (ref 70–99)
HCT VFR BLD CALC: 38.8 % — LOW (ref 42–52)
HGB BLD-MCNC: 12.5 G/DL — LOW (ref 14–18)
IMM GRANULOCYTES NFR BLD AUTO: 1.4 % — HIGH (ref 0.1–0.3)
LYMPHOCYTES # BLD AUTO: 1.19 K/UL — LOW (ref 1.2–3.4)
LYMPHOCYTES # BLD AUTO: 16.7 % — LOW (ref 20.5–51.1)
MAGNESIUM SERPL-MCNC: 1.9 MG/DL — SIGNIFICANT CHANGE UP (ref 1.8–2.4)
MCHC RBC-ENTMCNC: 29.6 PG — SIGNIFICANT CHANGE UP (ref 27–31)
MCHC RBC-ENTMCNC: 32.2 G/DL — SIGNIFICANT CHANGE UP (ref 32–37)
MCV RBC AUTO: 91.7 FL — SIGNIFICANT CHANGE UP (ref 80–94)
MONOCYTES # BLD AUTO: 0.51 K/UL — SIGNIFICANT CHANGE UP (ref 0.1–0.6)
MONOCYTES NFR BLD AUTO: 7.1 % — SIGNIFICANT CHANGE UP (ref 1.7–9.3)
NEUTROPHILS # BLD AUTO: 5.22 K/UL — SIGNIFICANT CHANGE UP (ref 1.4–6.5)
NEUTROPHILS NFR BLD AUTO: 73.1 % — SIGNIFICANT CHANGE UP (ref 42.2–75.2)
NRBC # BLD: 0 /100 WBCS — SIGNIFICANT CHANGE UP (ref 0–0)
ORGANISM # SPEC MICROSCOPIC CNT: SIGNIFICANT CHANGE UP
PLATELET # BLD AUTO: 287 K/UL — SIGNIFICANT CHANGE UP (ref 130–400)
PMV BLD: 10 FL — SIGNIFICANT CHANGE UP (ref 7.4–10.4)
POTASSIUM SERPL-MCNC: 5.1 MMOL/L — HIGH (ref 3.5–5)
POTASSIUM SERPL-SCNC: 5.1 MMOL/L — HIGH (ref 3.5–5)
PROT SERPL-MCNC: 6.3 G/DL — SIGNIFICANT CHANGE UP (ref 6–8)
RBC # BLD: 4.23 M/UL — LOW (ref 4.7–6.1)
RBC # FLD: 13.3 % — SIGNIFICANT CHANGE UP (ref 11.5–14.5)
SODIUM SERPL-SCNC: 137 MMOL/L — SIGNIFICANT CHANGE UP (ref 135–146)
SPECIMEN SOURCE: SIGNIFICANT CHANGE UP
WBC # BLD: 7.14 K/UL — SIGNIFICANT CHANGE UP (ref 4.8–10.8)
WBC # FLD AUTO: 7.14 K/UL — SIGNIFICANT CHANGE UP (ref 4.8–10.8)

## 2023-06-29 PROCEDURE — 99231 SBSQ HOSP IP/OBS SF/LOW 25: CPT

## 2023-06-29 RX ADMIN — LACOSAMIDE 100 MILLIGRAM(S): 50 TABLET ORAL at 17:33

## 2023-06-29 RX ADMIN — LACOSAMIDE 100 MILLIGRAM(S): 50 TABLET ORAL at 05:33

## 2023-06-29 RX ADMIN — INSULIN GLARGINE 27 UNIT(S): 100 INJECTION, SOLUTION SUBCUTANEOUS at 22:07

## 2023-06-29 RX ADMIN — Medication 750 MILLIGRAM(S): at 05:33

## 2023-06-29 RX ADMIN — Medication 500 MILLIGRAM(S): at 22:06

## 2023-06-29 RX ADMIN — Medication 500 MILLIGRAM(S): at 05:34

## 2023-06-29 RX ADMIN — LEVETIRACETAM 750 MILLIGRAM(S): 250 TABLET, FILM COATED ORAL at 05:33

## 2023-06-29 RX ADMIN — Medication 9 UNIT(S): at 08:27

## 2023-06-29 RX ADMIN — BUDESONIDE AND FORMOTEROL FUMARATE DIHYDRATE 2 PUFF(S): 160; 4.5 AEROSOL RESPIRATORY (INHALATION) at 08:00

## 2023-06-29 RX ADMIN — PANTOPRAZOLE SODIUM 40 MILLIGRAM(S): 20 TABLET, DELAYED RELEASE ORAL at 05:34

## 2023-06-29 RX ADMIN — Medication 750 MILLIGRAM(S): at 17:33

## 2023-06-29 RX ADMIN — LEVETIRACETAM 750 MILLIGRAM(S): 250 TABLET, FILM COATED ORAL at 17:34

## 2023-06-29 RX ADMIN — Medication 3: at 11:37

## 2023-06-29 RX ADMIN — Medication 1 DROP(S): at 11:37

## 2023-06-29 RX ADMIN — Medication 9 UNIT(S): at 11:38

## 2023-06-29 RX ADMIN — Medication 1 DROP(S): at 17:33

## 2023-06-29 RX ADMIN — ENOXAPARIN SODIUM 40 MILLIGRAM(S): 100 INJECTION SUBCUTANEOUS at 11:36

## 2023-06-29 RX ADMIN — Medication 1 DROP(S): at 05:34

## 2023-06-29 RX ADMIN — Medication 81 MILLIGRAM(S): at 11:36

## 2023-06-29 RX ADMIN — Medication 500 MILLIGRAM(S): at 14:54

## 2023-06-29 RX ADMIN — SENNA PLUS 2 TABLET(S): 8.6 TABLET ORAL at 22:07

## 2023-06-29 RX ADMIN — CEFTRIAXONE 100 MILLIGRAM(S): 500 INJECTION, POWDER, FOR SOLUTION INTRAMUSCULAR; INTRAVENOUS at 11:36

## 2023-06-29 RX ADMIN — Medication 1: at 08:27

## 2023-06-29 RX ADMIN — Medication 4: at 17:34

## 2023-06-29 RX ADMIN — Medication 9 UNIT(S): at 17:34

## 2023-06-29 NOTE — PROGRESS NOTE ADULT - SUBJECTIVE AND OBJECTIVE BOX
JOSÉ MANUEL PORTER  73y Male    CHIEF COMPLAINT:    Patient is a 73y old  Male who presents with a chief complaint of DFU    INTERVAL HPI/OVERNIGHT EVENTS:    Patient seen and examined. No acute events overnight. No active complaints     ROS: All other systems are negative.    Vital Signs:    T(F): 96 (06-29-23 @ 13:04), Max: 97.2 (06-29-23 @ 05:09)  HR: 72 (06-29-23 @ 13:04) (72 - 72)  BP: 109/54 (06-29-23 @ 13:04) (109/54 - 132/70)  RR: 18 (06-29-23 @ 13:04) (18 - 18)  SpO2: 99% (06-29-23 @ 05:09) (99% - 99%)    POCT Blood Glucose.: 286 mg/dL (29 Jun 2023 11:36)  POCT Blood Glucose.: 198 mg/dL (29 Jun 2023 08:00)  POCT Blood Glucose.: 133 mg/dL (28 Jun 2023 21:09)  POCT Blood Glucose.: 132 mg/dL (28 Jun 2023 17:09)    PHYSICAL EXAM:    GENERAL:  NAD  SKIN: No rashes or lesions  HEENT: Atraumatic. Normocephalic.    NECK: Supple, No JVD.   PULMONARY: CTA B/L. No wheezing. No rales  CVS: Normal S1, S2. Rate and Rhythm are regular   ABDOMEN/GI: Soft, Nontender, Nondistended  MSK:  No clubbing or cyanosis   NEUROLOGIC:  moves all extremities   PSYCH: Alert & oriented x 3, normal affect    Consultant(s) Notes Reviewed:  [x ] YES  [ ] NO  Care Discussed with Consultants/Other Providers [ x] YES  [ ] NO    LABS:                        12.5   7.14  )-----------( 287      ( 29 Jun 2023 08:31 )             38.8     137  |  104  |  18  ----------------------------<  216<H>  5.1<H>   |  26  |  1.1    Ca    8.7      29 Jun 2023 08:31  Mg     1.9     06-29    TPro  6.3  /  Alb  3.2<L>  /  TBili  <0.2  /  DBili  x   /  AST  22  /  ALT  17  /  AlkPhos  128<H>  06-29    RADIOLOGY & ADDITIONAL TESTS:  Imaging or report Personally Reviewed:  [x] YES  [ ] NO  EKG reviewed: [x] YES  [ ] NO    Medications:  Standing  aspirin  chewable 81 milliGRAM(s) Oral daily  budesonide  80 MICROgram(s)/formoterol 4.5 MICROgram(s) Inhaler 2 Puff(s) Inhalation two times a day  cefTRIAXone   IVPB 2000 milliGRAM(s) IV Intermittent every 24 hours  ciprofloxacin     Tablet 750 milliGRAM(s) Oral every 12 hours  dextrose 50% Injectable 25 Gram(s) IV Push once  enoxaparin Injectable 40 milliGRAM(s) SubCutaneous every 24 hours  glucagon  Injectable 1 milliGRAM(s) IntraMuscular once  insulin glargine Injectable (LANTUS) 27 Unit(s) SubCutaneous at bedtime  insulin lispro (ADMELOG) corrective regimen sliding scale   SubCutaneous three times a day before meals  insulin lispro Injectable (ADMELOG) 9 Unit(s) SubCutaneous three times a day before meals  lacosamide 100 milliGRAM(s) Oral two times a day  levETIRAcetam 750 milliGRAM(s) Oral two times a day  metroNIDAZOLE    Tablet 500 milliGRAM(s) Oral every 8 hours  pantoprazole    Tablet 40 milliGRAM(s) Oral before breakfast  prednisoLONE acetate 1% Suspension 1 Drop(s) Right EYE every 6 hours  senna 2 Tablet(s) Oral at bedtime    PRN Meds  albuterol    90 MICROgram(s) HFA Inhaler 2 Puff(s) Inhalation every 6 hours PRN  dextrose Oral Gel 15 Gram(s) Oral once PRN  polyethylene glycol 3350 17 Gram(s) Oral daily PRN

## 2023-06-29 NOTE — PROGRESS NOTE ADULT - ASSESSMENT
74 yo M w  PMH of DM  HLD  HTN  bipolar disorder  GERD  OM  with toe amputations was in by his podiatrist due to non healing ulceration to right foot.  Pt was recently admitted to [facility] 5/23 for debridement of the same ulcer.  Pt was seen in ER for similar sx 6/17, followed up with his podiatrist Dr Bhatia who referred pt back to ER as he was having purulent drainage, currently is s/p debridement found to have staph bacteremia also found in wound.  Had TTE which was negative for vegetation, on IV abx    Assessment  Right foot first metatarsal DFU with abscess with chronic OM s/p debridement  MSSA bacteremia, TTE negative  DM  HTN  Bipolar Disorder  Epilepsy    Plan  wound growing pseudomonas and MSSA, c/w Rocephin 2 gm iv q24h and Flagyl 500 PO Q8 till 7/9,Ciprofloxacin 750 mg PO q12h till 8/7  On 7/9, change Rocephin and Flagyl to Augmentin until 8/7  TTE negative, blood cx neg x2  monitor FS, titrate insulin up as needed based on FS   c/w home keppra and vimpat    Pending: home IV abx set up, anticipate dc tomorrow, patient in agreement

## 2023-06-30 VITALS — SYSTOLIC BLOOD PRESSURE: 110 MMHG | HEART RATE: 84 BPM | TEMPERATURE: 97 F | DIASTOLIC BLOOD PRESSURE: 66 MMHG

## 2023-06-30 LAB
CULTURE RESULTS: SIGNIFICANT CHANGE UP
GLUCOSE BLDC GLUCOMTR-MCNC: 231 MG/DL — HIGH (ref 70–99)
GLUCOSE BLDC GLUCOMTR-MCNC: 235 MG/DL — HIGH (ref 70–99)
GLUCOSE BLDC GLUCOMTR-MCNC: 277 MG/DL — HIGH (ref 70–99)
SPECIMEN SOURCE: SIGNIFICANT CHANGE UP

## 2023-06-30 PROCEDURE — 99238 HOSP IP/OBS DSCHRG MGMT 30/<: CPT

## 2023-06-30 RX ORDER — METRONIDAZOLE 500 MG
1 TABLET ORAL
Qty: 33 | Refills: 0
Start: 2023-06-30 | End: 2023-07-10

## 2023-06-30 RX ORDER — SENNA PLUS 8.6 MG/1
2 TABLET ORAL
Qty: 60 | Refills: 0
Start: 2023-06-30 | End: 2023-07-29

## 2023-06-30 RX ORDER — CIPROFLOXACIN LACTATE 400MG/40ML
1 VIAL (ML) INTRAVENOUS
Qty: 80 | Refills: 0
Start: 2023-06-30 | End: 2023-08-08

## 2023-06-30 RX ADMIN — Medication 500 MILLIGRAM(S): at 05:25

## 2023-06-30 RX ADMIN — Medication 1 DROP(S): at 00:59

## 2023-06-30 RX ADMIN — Medication 2: at 08:17

## 2023-06-30 RX ADMIN — Medication 9 UNIT(S): at 08:17

## 2023-06-30 RX ADMIN — LACOSAMIDE 100 MILLIGRAM(S): 50 TABLET ORAL at 17:06

## 2023-06-30 RX ADMIN — LEVETIRACETAM 750 MILLIGRAM(S): 250 TABLET, FILM COATED ORAL at 17:09

## 2023-06-30 RX ADMIN — PANTOPRAZOLE SODIUM 40 MILLIGRAM(S): 20 TABLET, DELAYED RELEASE ORAL at 06:12

## 2023-06-30 RX ADMIN — LACOSAMIDE 100 MILLIGRAM(S): 50 TABLET ORAL at 05:26

## 2023-06-30 RX ADMIN — Medication 1 DROP(S): at 17:10

## 2023-06-30 RX ADMIN — BUDESONIDE AND FORMOTEROL FUMARATE DIHYDRATE 2 PUFF(S): 160; 4.5 AEROSOL RESPIRATORY (INHALATION) at 08:18

## 2023-06-30 RX ADMIN — LEVETIRACETAM 750 MILLIGRAM(S): 250 TABLET, FILM COATED ORAL at 05:25

## 2023-06-30 RX ADMIN — Medication 500 MILLIGRAM(S): at 13:53

## 2023-06-30 RX ADMIN — Medication 1 DROP(S): at 05:25

## 2023-06-30 RX ADMIN — Medication 9 UNIT(S): at 17:10

## 2023-06-30 RX ADMIN — Medication 1 DROP(S): at 11:14

## 2023-06-30 RX ADMIN — Medication 750 MILLIGRAM(S): at 17:05

## 2023-06-30 RX ADMIN — Medication 81 MILLIGRAM(S): at 11:13

## 2023-06-30 RX ADMIN — Medication 9 UNIT(S): at 11:13

## 2023-06-30 RX ADMIN — CEFTRIAXONE 100 MILLIGRAM(S): 500 INJECTION, POWDER, FOR SOLUTION INTRAMUSCULAR; INTRAVENOUS at 11:18

## 2023-06-30 RX ADMIN — Medication 750 MILLIGRAM(S): at 05:25

## 2023-06-30 RX ADMIN — Medication 3: at 11:12

## 2023-06-30 RX ADMIN — Medication 2: at 17:10

## 2023-06-30 RX ADMIN — ENOXAPARIN SODIUM 40 MILLIGRAM(S): 100 INJECTION SUBCUTANEOUS at 08:18

## 2023-06-30 NOTE — PROCEDURE NOTE - NSPROCDETAILS_GEN_ALL_CORE
location identified, draped/prepped, sterile technique used/sterile dressing applied/sterile technique, catheter placed/supine position/ultrasound guidance
location identified, draped/prepped, sterile technique used/sterile dressing applied/supine position/ultrasound guidance

## 2023-06-30 NOTE — PROGRESS NOTE ADULT - PROVIDER SPECIALTY LIST ADULT
Hospitalist
Hospitalist
Internal Medicine
Podiatry
Podiatry
Hospitalist
Infectious Disease
Internal Medicine
Infectious Disease
Infectious Disease

## 2023-06-30 NOTE — PROGRESS NOTE ADULT - ASSESSMENT
72 yo M w  PMH of DM, HLD, HTN, bipolar disorder, GERD, OM with toe amputations was sent in by his podiatrist due to non healing ulceration to right foot.     #Right infected diabetic foot ulcer complicated by an abscess s/p drainage and debridement by podiatry          - Right foot x-ray: Possible 1.1 cm gas containing collection seen abutting the first metatarsal transection (new compared to XR in 5/23)  - ESR: 46, Arterial duplex: WNL   - podiatry: Excisional debridement of soft tissue (6/23)  - Blood cx: MSSA/Strep  - Wound cx: MSSA/Pseudomona/Kleb  - s/p Zosyn  - ID recs noted: DC on IV abx, transition to PO later-  for DC home with IV Abx  - Bcx -ve X2  - TTE: No vegetation  - LE duplex -ve  - DW Podiatry, pt restarted on dvt ppx    #Elevated Alk phos- improved     #DMT2- c/w Basal Bolus adjusted from 10/3/3/3 to 15/5/5/5 6/27  #Asthma- not in exacerbation- c/w inhalers  #Seizure Disorder- c/w home Keppra 750mg BID, home lacosamide 100mg BID  #HO Bipolar Depression- Stable- Monitor off meds  #GERD- cw Proton Pump Inhibitor    DVT prophylaxis: Lovenox  GI prophylaxis: Protonix  Diet: DASH/CC  Code status: Full  Activity: IAT

## 2023-06-30 NOTE — PROGRESS NOTE ADULT - ATTENDING COMMENTS
Discharge note reviewed and updated. Discharge medications reviewed  Follow up with PCP and podiatry  Follow up with ID    Discharge plan to home with home care
s/p right foot debridement with wash out  decreased maceration surrounding the dorsal ulcer  continue w/ packing  antibiotics as per ID  pt ok to d/c from podiatric perspective    My notes supersedes the above resident note in case of discrepancy.     Royal Bhatia DPM

## 2023-06-30 NOTE — PROGRESS NOTE ADULT - SUBJECTIVE AND OBJECTIVE BOX
SUBJECTIVE:    Patient is a 73y old Male who presents with a chief complaint of   Currently admitted to medicine with the primary diagnosis of Diabetic ulcer of right foot       Today is hospital day 9d.     HPI:  72 yo M w  PMH of DM  HLD  HTN  bipolar disorder  GERD  OM  with toe amputations was in by his podiatrist due to non healing ulceration to right foot.  Pt was recently admitted to [facility] 5/23 for debridement of the same ulcer.  Pt was seen in ER for similar sx 6/17, followed up with his podiatrist Dr Bhatia today who referred pt back to ER. As per podiatry's note the ulcer had no purulent discharge on 6/17, but today has malodorous purulent discharge.  At my encounter pt denies pain at the site and denies fever / NV / diarrhoea / CP.    In the ED, /102  Labs: lipase 107  XR foot:    Possible 1.1 cm gas containing collection seen abutting the first metatarsal transection (new compared to XR in 5/23)   No new cortical destruction to suggest osteomyelitis  art duplex 5/23: wnl     (22 Jun 2023 01:32)      PAST MEDICAL & SURGICAL HISTORY  DM (diabetes mellitus)  Type 2, 12/27/18 hgb aic  11.2    HTN (hypertension)    Dyslipidemia    Diabetic foot ulcer    Depression    GERD (gastroesophageal reflux disease)    Neuropathy, diabetic    Toe amputation status, right  (2008)    History of amputation of toe  LT -partial 1st toe        ALLERGIES:  No Known Allergies    MEDICATIONS:  ACTIVE MEDICATIONS  albuterol    90 MICROgram(s) HFA Inhaler 2 Puff(s) Inhalation every 6 hours PRN  aspirin  chewable 81 milliGRAM(s) Oral daily  budesonide  80 MICROgram(s)/formoterol 4.5 MICROgram(s) Inhaler 2 Puff(s) Inhalation two times a day  cefTRIAXone   IVPB 2000 milliGRAM(s) IV Intermittent every 24 hours  ciprofloxacin     Tablet 750 milliGRAM(s) Oral every 12 hours  dextrose 50% Injectable 25 Gram(s) IV Push once  dextrose Oral Gel 15 Gram(s) Oral once PRN  enoxaparin Injectable 40 milliGRAM(s) SubCutaneous every 24 hours  glucagon  Injectable 1 milliGRAM(s) IntraMuscular once  insulin glargine Injectable (LANTUS) 27 Unit(s) SubCutaneous at bedtime  insulin lispro (ADMELOG) corrective regimen sliding scale   SubCutaneous three times a day before meals  insulin lispro Injectable (ADMELOG) 9 Unit(s) SubCutaneous three times a day before meals  lacosamide 100 milliGRAM(s) Oral two times a day  levETIRAcetam 750 milliGRAM(s) Oral two times a day  metroNIDAZOLE    Tablet 500 milliGRAM(s) Oral every 8 hours  pantoprazole    Tablet 40 milliGRAM(s) Oral before breakfast  polyethylene glycol 3350 17 Gram(s) Oral daily PRN  prednisoLONE acetate 1% Suspension 1 Drop(s) Right EYE every 6 hours  senna 2 Tablet(s) Oral at bedtime      VITALS:   T(F): 96.4  HR: 73  BP: 124/60  RR: 18  SpO2: --    LABS:                        12.5   7.14  )-----------( 287      ( 29 Jun 2023 08:31 )             38.8     06-29    137  |  104  |  18  ----------------------------<  216<H>  5.1<H>   |  26  |  1.1    Ca    8.7      29 Jun 2023 08:31  Mg     1.9     06-29    TPro  6.3  /  Alb  3.2<L>  /  TBili  <0.2  /  DBili  x   /  AST  22  /  ALT  17  /  AlkPhos  128<H>  06-29      Urinalysis Basic - ( 29 Jun 2023 08:31 )    Color: x / Appearance: x / SG: x / pH: x  Gluc: 216 mg/dL / Ketone: x  / Bili: x / Urobili: x   Blood: x / Protein: x / Nitrite: x   Leuk Esterase: x / RBC: x / WBC x   Sq Epi: x / Non Sq Epi: x / Bacteria: x                    PHYSICAL EXAM:  GEN: No acute distress  LUNGS: Clear to auscultation bilaterally   HEART: S1/S2 present.    ABD: Soft, non-tender, non-distended.   EXT: Bandaged foot  NEURO: AAOX3

## 2023-07-01 LAB
CULTURE RESULTS: SIGNIFICANT CHANGE UP
SPECIMEN SOURCE: SIGNIFICANT CHANGE UP

## 2023-07-04 LAB
CULTURE RESULTS: SIGNIFICANT CHANGE UP
SPECIMEN SOURCE: SIGNIFICANT CHANGE UP

## 2023-07-05 ENCOUNTER — APPOINTMENT (OUTPATIENT)
Dept: PODIATRY | Facility: CLINIC | Age: 73
End: 2023-07-05
Payer: MEDICARE

## 2023-07-05 ENCOUNTER — OUTPATIENT (OUTPATIENT)
Dept: OUTPATIENT SERVICES | Facility: HOSPITAL | Age: 73
LOS: 1 days | End: 2023-07-05
Payer: MEDICARE

## 2023-07-05 DIAGNOSIS — Z89.429 ACQUIRED ABSENCE OF OTHER TOE(S), UNSPECIFIED SIDE: Chronic | ICD-10-CM

## 2023-07-05 DIAGNOSIS — Z00.00 ENCOUNTER FOR GENERAL ADULT MEDICAL EXAMINATION WITHOUT ABNORMAL FINDINGS: ICD-10-CM

## 2023-07-05 PROCEDURE — 99212 OFFICE O/P EST SF 10 MIN: CPT | Mod: 24

## 2023-07-05 PROCEDURE — 99212 OFFICE O/P EST SF 10 MIN: CPT

## 2023-07-05 NOTE — ASSESSMENT
[FreeTextEntry1] : plantar sutures removed and skin debridement. noted reapproximation of the deep tissue. \par continue abx\par local wound care with xeroform and gauze dressing\par return 1 week

## 2023-07-05 NOTE — HISTORY OF PRESENT ILLNESS
[FreeTextEntry1] : 72 y/o diabetic male s/p right foot ulcer debridement w/ removal of staples 5/18/2023. Here today for follow up wound care\par \par 6/21 presents today with increased drainage to the right foot. Patient notes increased drainage since saturday\par \par 7/5 patient s/p right foot debridement of tissue and bone 6/23 . doing well. no pain

## 2023-07-05 NOTE — PHYSICAL EXAM
[General Appearance - Alert] : alert [General Appearance - In No Acute Distress] : in no acute distress [Ankle Swelling On The Right] : of the right ankle [Varicose Veins Of The Right Leg] : on the right foot/ankle [1+] : right foot dorsalis pedis 1+ [0] : left foot dorsalis pedis 0 [Ankle Swelling (On Exam)] : not present [Varicose Veins Of Lower Extremities] : not present [] : not present [FreeTextEntry1] : plantar skin ulcer is healing well\par dorsal ulcer w/ decreased maceration tissue. progressing well s/p debridement

## 2023-07-07 DIAGNOSIS — L97.519 NON-PRESSURE CHRONIC ULCER OF OTHER PART OF RIGHT FOOT WITH UNSPECIFIED SEVERITY: ICD-10-CM

## 2023-07-07 DIAGNOSIS — E11.621 TYPE 2 DIABETES MELLITUS WITH FOOT ULCER: ICD-10-CM

## 2023-07-08 NOTE — DISCHARGE NOTE PROVIDER - NSDCHC_MEDRECSTATUS_GEN_ALL_CORE
Patient alert and orientated x4. Post-op day #1. Dressing in place to right leg. VSS. Saline locked. Monitoring blood glucose levels per order. Tolerating general diet. Voiding via purewick. Patient is on room air. SCDs on for DVT prophylaxis. PRN Norco given for Pain medication provided as needed. Up with x2 assist and a walker- Rolling chair to the bathroom. Encouraged frequent ambulation and use of incentive spirometer. Fall precautions maintained- bed alarm on, bed locked in lowest position, call light and personal belongings within reach, non-skid socks in place to bilateral feet. Frequent rounding by nursing staff. Plan to work with PT/OT for discharge planning. Problem: Patient Centered Care  Goal: Patient preferences are identified and integrated in the patient's plan of care  Description: Interventions:  - What would you like us to know as we care for you?  I live at [facility] with my   - Provide timely, complete, and accurate information to patient/family  - Incorporate patient and family knowledge, values, beliefs, and cultural backgrounds into the planning and delivery of care  - Encourage patient/family to participate in care and decision-making at the level they choose  - Honor patient and family perspectives and choices  Outcome: Progressing     Problem: PAIN - ADULT  Goal: Verbalizes/displays adequate comfort level or patient's stated pain goal  Description: INTERVENTIONS:  - Encourage pt to monitor pain and request assistance  - Assess pain using appropriate pain scale  - Administer analgesics based on type and severity of pain and evaluate response  - Implement non-pharmacological measures as appropriate and evaluate response  - Consider cultural and social influences on pain and pain management  - Manage/alleviate anxiety  - Utilize distraction and/or relaxation techniques  - Monitor for opioid side effects  - Notify MD/LIP if interventions unsuccessful or patient reports new pain  - Anticipate increased pain with activity and pre-medicate as appropriate  Outcome: Progressing     Problem: SAFETY ADULT - FALL  Goal: Free from fall injury  Description: INTERVENTIONS:  - Assess pt frequently for physical needs  - Identify cognitive and physical deficits and behaviors that affect risk of falls.   - [place] fall precautions as indicated by assessment.  - Educate pt/family on patient safety including physical limitations  - Instruct pt to call for assistance with activity based on assessment  - Modify environment to reduce risk of injury  - Provide assistive devices as appropriate  - Consider OT/PT consult to assist with strengthening/mobility  - Encourage toileting schedule  Outcome: Progressing     Problem: DISCHARGE PLANNING  Goal: Discharge to home or other facility with appropriate resources  Description: INTERVENTIONS:  - Identify barriers to discharge w/pt and caregiver  - Include patient/family/discharge partner in discharge planning  - Arrange for needed discharge resources and transportation as appropriate  - Identify discharge learning needs (meds, wound care, etc)  - Arrange for interpreters to assist at discharge as needed  - Consider post-discharge preferences of patient/family/discharge partner  - Complete POLST form as appropriate  - Assess patient's ability to be responsible for managing their own health  - Refer to Case Management Department for coordinating discharge planning if the patient needs post-hospital services based on physician/LIP order or complex needs related to functional status, cognitive ability or social support system  Outcome: Progressing     Problem: MUSCULOSKELETAL - ADULT  Goal: Return mobility to safest level of function  Description: INTERVENTIONS:  - Assess patient stability and activity tolerance for standing, transferring and ambulating w/ or w/o assistive devices  - Assist with transfers and ambulation using safe patient handling equipment as needed  - Ensure adequate protection for wounds/incisions during mobilization  - Obtain PT/OT consults as needed  - Advance activity as appropriate  - Communicate ordered activity level and limitations with patient/family  Outcome: Progressing  Goal: Maintain proper alignment of affected body part  Description: INTERVENTIONS:  - Support and protect limb and body alignment per provider's orders  - Instruct and reinforce with patient and family use of appropriate assistive device and precautions (e.g. spinal or hip dislocation precautions)  Outcome: Progressing Admission Reconciliation is Completed  Discharge Reconciliation is Completed

## 2023-07-12 ENCOUNTER — APPOINTMENT (OUTPATIENT)
Dept: PODIATRY | Facility: CLINIC | Age: 73
End: 2023-07-12
Payer: MEDICARE

## 2023-07-12 ENCOUNTER — NON-APPOINTMENT (OUTPATIENT)
Age: 73
End: 2023-07-12

## 2023-07-12 ENCOUNTER — APPOINTMENT (OUTPATIENT)
Dept: CARDIOLOGY | Facility: CLINIC | Age: 73
End: 2023-07-12
Payer: MEDICARE

## 2023-07-12 ENCOUNTER — OUTPATIENT (OUTPATIENT)
Dept: OUTPATIENT SERVICES | Facility: HOSPITAL | Age: 73
LOS: 1 days | End: 2023-07-12
Payer: MEDICARE

## 2023-07-12 DIAGNOSIS — Z89.429 ACQUIRED ABSENCE OF OTHER TOE(S), UNSPECIFIED SIDE: Chronic | ICD-10-CM

## 2023-07-12 DIAGNOSIS — Z00.00 ENCOUNTER FOR GENERAL ADULT MEDICAL EXAMINATION WITHOUT ABNORMAL FINDINGS: ICD-10-CM

## 2023-07-12 DIAGNOSIS — Z89.421 ACQUIRED ABSENCE OF OTHER RIGHT TOE(S): Chronic | ICD-10-CM

## 2023-07-12 PROCEDURE — 99212 OFFICE O/P EST SF 10 MIN: CPT | Mod: 24

## 2023-07-12 PROCEDURE — G2066: CPT

## 2023-07-12 PROCEDURE — 93298 REM INTERROG DEV EVAL SCRMS: CPT

## 2023-07-12 PROCEDURE — 99212 OFFICE O/P EST SF 10 MIN: CPT

## 2023-07-12 NOTE — ASSESSMENT
[FreeTextEntry1] : light debridement of callus\par local wound care with xeroform.\par return 1 week

## 2023-07-12 NOTE — HISTORY OF PRESENT ILLNESS
[FreeTextEntry1] : 72 y/o diabetic male s/p right foot ulcer debridement w/ removal of staples 5/18/2023. Here today for follow up wound care\par \par 6/21 presents today with increased drainage to the right foot. Patient notes increased drainage since saturday\par \par 7/5 patient s/p right foot debridement of tissue and bone 6/23 . doing well. no pain\par 7/12 returns for care

## 2023-07-12 NOTE — PHYSICAL EXAM
[General Appearance - Alert] : alert [General Appearance - In No Acute Distress] : in no acute distress [Ankle Swelling On The Right] : of the right ankle [Varicose Veins Of The Right Leg] : on the right foot/ankle [1+] : right foot dorsalis pedis 1+ [0] : left foot dorsalis pedis 0 [Ankle Swelling (On Exam)] : not present [Varicose Veins Of Lower Extremities] : not present [] : not present [FreeTextEntry1] : plantar skin ulcer is healing well. noted overlying hyperkeratotic tissue \par dorsal ulcer healing well. no maceraton

## 2023-07-13 DIAGNOSIS — L97.519 NON-PRESSURE CHRONIC ULCER OF OTHER PART OF RIGHT FOOT WITH UNSPECIFIED SEVERITY: ICD-10-CM

## 2023-07-14 DIAGNOSIS — E11.69 TYPE 2 DIABETES MELLITUS WITH OTHER SPECIFIED COMPLICATION: ICD-10-CM

## 2023-07-14 DIAGNOSIS — Z79.84 LONG TERM (CURRENT) USE OF ORAL HYPOGLYCEMIC DRUGS: ICD-10-CM

## 2023-07-14 DIAGNOSIS — Z79.82 LONG TERM (CURRENT) USE OF ASPIRIN: ICD-10-CM

## 2023-07-14 DIAGNOSIS — K21.9 GASTRO-ESOPHAGEAL REFLUX DISEASE WITHOUT ESOPHAGITIS: ICD-10-CM

## 2023-07-14 DIAGNOSIS — Z89.412 ACQUIRED ABSENCE OF LEFT GREAT TOE: ICD-10-CM

## 2023-07-14 DIAGNOSIS — B96.89 OTHER SPECIFIED BACTERIAL AGENTS AS THE CAUSE OF DISEASES CLASSIFIED ELSEWHERE: ICD-10-CM

## 2023-07-14 DIAGNOSIS — L97.519 NON-PRESSURE CHRONIC ULCER OF OTHER PART OF RIGHT FOOT WITH UNSPECIFIED SEVERITY: ICD-10-CM

## 2023-07-14 DIAGNOSIS — E78.5 HYPERLIPIDEMIA, UNSPECIFIED: ICD-10-CM

## 2023-07-14 DIAGNOSIS — B96.1 KLEBSIELLA PNEUMONIAE [K. PNEUMONIAE] AS THE CAUSE OF DISEASES CLASSIFIED ELSEWHERE: ICD-10-CM

## 2023-07-14 DIAGNOSIS — L02.611 CUTANEOUS ABSCESS OF RIGHT FOOT: ICD-10-CM

## 2023-07-14 DIAGNOSIS — Z89.421 ACQUIRED ABSENCE OF OTHER RIGHT TOE(S): ICD-10-CM

## 2023-07-14 DIAGNOSIS — L03.115 CELLULITIS OF RIGHT LOWER LIMB: ICD-10-CM

## 2023-07-14 DIAGNOSIS — J45.909 UNSPECIFIED ASTHMA, UNCOMPLICATED: ICD-10-CM

## 2023-07-14 DIAGNOSIS — R78.81 BACTEREMIA: ICD-10-CM

## 2023-07-14 DIAGNOSIS — M86.671 OTHER CHRONIC OSTEOMYELITIS, RIGHT ANKLE AND FOOT: ICD-10-CM

## 2023-07-14 DIAGNOSIS — G40.909 EPILEPSY, UNSPECIFIED, NOT INTRACTABLE, WITHOUT STATUS EPILEPTICUS: ICD-10-CM

## 2023-07-14 DIAGNOSIS — F31.9 BIPOLAR DISORDER, UNSPECIFIED: ICD-10-CM

## 2023-07-14 DIAGNOSIS — B95.61 METHICILLIN SUSCEPTIBLE STAPHYLOCOCCUS AUREUS INFECTION AS THE CAUSE OF DISEASES CLASSIFIED ELSEWHERE: ICD-10-CM

## 2023-07-14 DIAGNOSIS — Z79.52 LONG TERM (CURRENT) USE OF SYSTEMIC STEROIDS: ICD-10-CM

## 2023-07-14 DIAGNOSIS — I10 ESSENTIAL (PRIMARY) HYPERTENSION: ICD-10-CM

## 2023-07-14 DIAGNOSIS — Z79.85 LONG-TERM (CURRENT) USE OF INJECTABLE NON-INSULIN ANTIDIABETIC DRUGS: ICD-10-CM

## 2023-07-14 DIAGNOSIS — E11.621 TYPE 2 DIABETES MELLITUS WITH FOOT ULCER: ICD-10-CM

## 2023-07-19 ENCOUNTER — OUTPATIENT (OUTPATIENT)
Dept: OUTPATIENT SERVICES | Facility: HOSPITAL | Age: 73
LOS: 1 days | End: 2023-07-19
Payer: MEDICARE

## 2023-07-19 ENCOUNTER — APPOINTMENT (OUTPATIENT)
Dept: PODIATRY | Facility: CLINIC | Age: 73
End: 2023-07-19
Payer: MEDICARE

## 2023-07-19 DIAGNOSIS — Z00.00 ENCOUNTER FOR GENERAL ADULT MEDICAL EXAMINATION WITHOUT ABNORMAL FINDINGS: ICD-10-CM

## 2023-07-19 DIAGNOSIS — L97.519 NON-PRESSURE CHRONIC ULCER OF OTHER PART OF RIGHT FOOT WITH UNSPECIFIED SEVERITY: ICD-10-CM

## 2023-07-19 DIAGNOSIS — Z89.429 ACQUIRED ABSENCE OF OTHER TOE(S), UNSPECIFIED SIDE: Chronic | ICD-10-CM

## 2023-07-19 DIAGNOSIS — Z89.421 ACQUIRED ABSENCE OF OTHER RIGHT TOE(S): Chronic | ICD-10-CM

## 2023-07-19 PROCEDURE — 99212 OFFICE O/P EST SF 10 MIN: CPT | Mod: 24

## 2023-07-19 PROCEDURE — 99212 OFFICE O/P EST SF 10 MIN: CPT

## 2023-07-19 NOTE — HISTORY OF PRESENT ILLNESS
[FreeTextEntry1] : 72 y/o diabetic male s/p right foot ulcer debridement w/ removal of staples 5/18/2023. Here today for follow up wound care\par \par 6/21 presents today with increased drainage to the right foot. Patient notes increased drainage since saturday\par \par 7/5 patient s/p right foot debridement of tissue and bone 6/23 . doing well. no pain\par 7/12 returns for care\par 7/19 here for follow up

## 2023-07-19 NOTE — END OF VISIT
[] : Resident [FreeTextEntry3] : plantar and dorsal wound have healed\par no surgical dressing needed\par follow up with [employer] for fabrication of inserts\par return 3 month\par \par My notes supersedes the above resident documentation in case of discrepancy. Correction for their notes is in my notes. \par \par

## 2023-07-19 NOTE — ASSESSMENT
[FreeTextEntry1] : -Ulcer healed, no wound care needed\par -Callus debrided with #15 blade without incident\par -Will make appt with [facility]sh[facility] for orthotics to offload area with toe filler\par -RTC 3 weeks to see Jamshid

## 2023-07-19 NOTE — PHYSICAL EXAM
[General Appearance - Alert] : alert [General Appearance - In No Acute Distress] : in no acute distress [Ankle Swelling On The Right] : of the right ankle [Varicose Veins Of The Right Leg] : on the right foot/ankle [1+] : right foot dorsalis pedis 1+ [0] : left foot dorsalis pedis 0 [Ankle Swelling (On Exam)] : not present [Varicose Veins Of Lower Extremities] : not present [] : not present [de-identified] : s/p TMA right foot [FreeTextEntry1] : Light touch and gross sensation intact

## 2023-07-21 DIAGNOSIS — L97.519 NON-PRESSURE CHRONIC ULCER OF OTHER PART OF RIGHT FOOT WITH UNSPECIFIED SEVERITY: ICD-10-CM

## 2023-07-21 DIAGNOSIS — E11.40 TYPE 2 DIABETES MELLITUS WITH DIABETIC NEUROPATHY, UNSPECIFIED: ICD-10-CM

## 2023-07-21 DIAGNOSIS — Z89.431 ACQUIRED ABSENCE OF RIGHT FOOT: ICD-10-CM

## 2023-07-22 LAB
CULTURE RESULTS: SIGNIFICANT CHANGE UP
SPECIMEN SOURCE: SIGNIFICANT CHANGE UP

## 2023-08-07 ENCOUNTER — INPATIENT (INPATIENT)
Facility: HOSPITAL | Age: 73
LOS: 8 days | Discharge: HOME HEALTH | DRG: 208 | End: 2023-08-16
Attending: HOSPITALIST | Admitting: INTERNAL MEDICINE
Payer: MEDICARE

## 2023-08-07 ENCOUNTER — APPOINTMENT (OUTPATIENT)
Dept: PODIATRY | Facility: CLINIC | Age: 73
End: 2023-08-07

## 2023-08-07 VITALS
SYSTOLIC BLOOD PRESSURE: 162 MMHG | OXYGEN SATURATION: 99 % | HEART RATE: 95 BPM | DIASTOLIC BLOOD PRESSURE: 74 MMHG | RESPIRATION RATE: 20 BRPM | TEMPERATURE: 99 F | HEIGHT: 71 IN

## 2023-08-07 DIAGNOSIS — R09.02 HYPOXEMIA: ICD-10-CM

## 2023-08-07 DIAGNOSIS — Z89.421 ACQUIRED ABSENCE OF OTHER RIGHT TOE(S): Chronic | ICD-10-CM

## 2023-08-07 DIAGNOSIS — Z89.429 ACQUIRED ABSENCE OF OTHER TOE(S), UNSPECIFIED SIDE: Chronic | ICD-10-CM

## 2023-08-07 LAB
ALBUMIN SERPL ELPH-MCNC: 4 G/DL — SIGNIFICANT CHANGE UP (ref 3.5–5.2)
ALBUMIN SERPL ELPH-MCNC: 4.1 G/DL — SIGNIFICANT CHANGE UP (ref 3.5–5.2)
ALP SERPL-CCNC: 116 U/L — HIGH (ref 30–115)
ALP SERPL-CCNC: 167 U/L — HIGH (ref 30–115)
ALT FLD-CCNC: 15 U/L — SIGNIFICANT CHANGE UP (ref 0–41)
ALT FLD-CCNC: 16 U/L — SIGNIFICANT CHANGE UP (ref 0–41)
AMPHET UR-MCNC: NEGATIVE — SIGNIFICANT CHANGE UP
AMYLASE P1 CFR SERPL: 62 U/L — SIGNIFICANT CHANGE UP (ref 25–115)
ANION GAP SERPL CALC-SCNC: 17 MMOL/L — HIGH (ref 7–14)
APPEARANCE UR: CLEAR — SIGNIFICANT CHANGE UP
APPEARANCE UR: CLEAR — SIGNIFICANT CHANGE UP
AST SERPL-CCNC: 13 U/L — SIGNIFICANT CHANGE UP (ref 0–41)
AST SERPL-CCNC: 15 U/L — SIGNIFICANT CHANGE UP (ref 0–41)
B-OH-BUTYR SERPL-SCNC: 1 MMOL/L — HIGH
B-OH-BUTYR SERPL-SCNC: <0.2 MMOL/L — SIGNIFICANT CHANGE UP
BACTERIA # UR AUTO: NEGATIVE /HPF — SIGNIFICANT CHANGE UP
BARBITURATES UR SCN-MCNC: NEGATIVE — SIGNIFICANT CHANGE UP
BASE EXCESS BLDV CALC-SCNC: 0.2 MMOL/L — SIGNIFICANT CHANGE UP (ref -2–3)
BASOPHILS # BLD AUTO: 0.02 K/UL — SIGNIFICANT CHANGE UP (ref 0–0.2)
BASOPHILS # BLD AUTO: 0.03 K/UL — SIGNIFICANT CHANGE UP (ref 0–0.2)
BASOPHILS NFR BLD AUTO: 0.3 % — SIGNIFICANT CHANGE UP (ref 0–1)
BASOPHILS NFR BLD AUTO: 0.5 % — SIGNIFICANT CHANGE UP (ref 0–1)
BENZODIAZ UR-MCNC: NEGATIVE — SIGNIFICANT CHANGE UP
BILIRUB SERPL-MCNC: 0.4 MG/DL — SIGNIFICANT CHANGE UP (ref 0.2–1.2)
BILIRUB SERPL-MCNC: 0.5 MG/DL — SIGNIFICANT CHANGE UP (ref 0.2–1.2)
BILIRUB UR-MCNC: NEGATIVE — SIGNIFICANT CHANGE UP
BILIRUB UR-MCNC: NEGATIVE — SIGNIFICANT CHANGE UP
BUN SERPL-MCNC: 35 MG/DL — HIGH (ref 10–20)
BUN SERPL-MCNC: 35 MG/DL — HIGH (ref 10–20)
BUN SERPL-MCNC: 36 MG/DL — HIGH (ref 10–20)
CA-I SERPL-SCNC: 1.23 MMOL/L — SIGNIFICANT CHANGE UP (ref 1.15–1.33)
CALCIUM SERPL-MCNC: 8.7 MG/DL — SIGNIFICANT CHANGE UP (ref 8.4–10.5)
CALCIUM SERPL-MCNC: 9.6 MG/DL — SIGNIFICANT CHANGE UP (ref 8.4–10.5)
CALCIUM SERPL-MCNC: 9.9 MG/DL — SIGNIFICANT CHANGE UP (ref 8.4–10.5)
CAST: 0 /LPF — SIGNIFICANT CHANGE UP (ref 0–4)
CHLORIDE SERPL-SCNC: 104 MMOL/L — SIGNIFICANT CHANGE UP (ref 98–110)
CHLORIDE SERPL-SCNC: 107 MMOL/L — SIGNIFICANT CHANGE UP (ref 98–110)
CHLORIDE SERPL-SCNC: 90 MMOL/L — LOW (ref 98–110)
CO2 SERPL-SCNC: 17 MMOL/L — SIGNIFICANT CHANGE UP (ref 17–32)
CO2 SERPL-SCNC: 23 MMOL/L — SIGNIFICANT CHANGE UP (ref 17–32)
CO2 SERPL-SCNC: 26 MMOL/L — SIGNIFICANT CHANGE UP (ref 17–32)
COCAINE METAB.OTHER UR-MCNC: NEGATIVE — SIGNIFICANT CHANGE UP
COLOR SPEC: YELLOW — SIGNIFICANT CHANGE UP
COLOR SPEC: YELLOW — SIGNIFICANT CHANGE UP
CREAT ?TM UR-MCNC: 40 MG/DL — SIGNIFICANT CHANGE UP
CREAT SERPL-MCNC: 1.5 MG/DL — SIGNIFICANT CHANGE UP (ref 0.7–1.5)
CREAT SERPL-MCNC: 1.6 MG/DL — HIGH (ref 0.7–1.5)
CREAT SERPL-MCNC: 1.6 MG/DL — HIGH (ref 0.7–1.5)
D DIMER BLD IA.RAPID-MCNC: 1083 NG/ML DDU — HIGH
DIFF PNL FLD: ABNORMAL
DIFF PNL FLD: NEGATIVE — SIGNIFICANT CHANGE UP
DRUG SCREEN 1, URINE RESULT: SIGNIFICANT CHANGE UP
EGFR: 45 ML/MIN/1.73M2 — LOW
EGFR: 45 ML/MIN/1.73M2 — LOW
EGFR: 48 ML/MIN/1.73M2 — LOW
EOSINOPHIL # BLD AUTO: 0 K/UL — SIGNIFICANT CHANGE UP (ref 0–0.7)
EOSINOPHIL # BLD AUTO: 0.02 K/UL — SIGNIFICANT CHANGE UP (ref 0–0.7)
EOSINOPHIL NFR BLD AUTO: 0 % — SIGNIFICANT CHANGE UP (ref 0–8)
EOSINOPHIL NFR BLD AUTO: 0.3 % — SIGNIFICANT CHANGE UP (ref 0–8)
FENTANYL UR QL: POSITIVE
GAS PNL BLDA: SIGNIFICANT CHANGE UP
GAS PNL BLDV: 131 MMOL/L — LOW (ref 136–145)
GAS PNL BLDV: SIGNIFICANT CHANGE UP
GAS PNL BLDV: SIGNIFICANT CHANGE UP
GLUCOSE BLDC GLUCOMTR-MCNC: 126 MG/DL — HIGH (ref 70–99)
GLUCOSE BLDC GLUCOMTR-MCNC: 141 MG/DL — HIGH (ref 70–99)
GLUCOSE BLDC GLUCOMTR-MCNC: 164 MG/DL — HIGH (ref 70–99)
GLUCOSE BLDC GLUCOMTR-MCNC: 177 MG/DL — HIGH (ref 70–99)
GLUCOSE BLDC GLUCOMTR-MCNC: 193 MG/DL — HIGH (ref 70–99)
GLUCOSE BLDC GLUCOMTR-MCNC: 194 MG/DL — HIGH (ref 70–99)
GLUCOSE BLDC GLUCOMTR-MCNC: 215 MG/DL — HIGH (ref 70–99)
GLUCOSE BLDC GLUCOMTR-MCNC: 221 MG/DL — HIGH (ref 70–99)
GLUCOSE BLDC GLUCOMTR-MCNC: 223 MG/DL — HIGH (ref 70–99)
GLUCOSE BLDC GLUCOMTR-MCNC: 241 MG/DL — HIGH (ref 70–99)
GLUCOSE BLDC GLUCOMTR-MCNC: 242 MG/DL — HIGH (ref 70–99)
GLUCOSE BLDC GLUCOMTR-MCNC: 249 MG/DL — HIGH (ref 70–99)
GLUCOSE BLDC GLUCOMTR-MCNC: 260 MG/DL — HIGH (ref 70–99)
GLUCOSE BLDC GLUCOMTR-MCNC: 388 MG/DL — HIGH (ref 70–99)
GLUCOSE BLDC GLUCOMTR-MCNC: 490 MG/DL — CRITICAL HIGH (ref 70–99)
GLUCOSE BLDC GLUCOMTR-MCNC: 560 MG/DL — CRITICAL HIGH (ref 70–99)
GLUCOSE SERPL-MCNC: 160 MG/DL — HIGH (ref 70–99)
GLUCOSE SERPL-MCNC: 228 MG/DL — HIGH (ref 70–99)
GLUCOSE SERPL-MCNC: 859 MG/DL — CRITICAL HIGH (ref 70–99)
GLUCOSE UR QL: >=1000 MG/DL
GLUCOSE UR QL: >=1000 MG/DL
HCO3 BLDV-SCNC: 27 MMOL/L — SIGNIFICANT CHANGE UP (ref 22–29)
HCT VFR BLD CALC: 37.4 % — LOW (ref 42–52)
HCT VFR BLD CALC: 41 % — LOW (ref 42–52)
HCT VFR BLDA CALC: 39 % — SIGNIFICANT CHANGE UP (ref 39–51)
HGB BLD CALC-MCNC: 13 G/DL — SIGNIFICANT CHANGE UP (ref 12.6–17.4)
HGB BLD-MCNC: 12.9 G/DL — LOW (ref 14–18)
HGB BLD-MCNC: 13.7 G/DL — LOW (ref 14–18)
IMM GRANULOCYTES NFR BLD AUTO: 0.7 % — HIGH (ref 0.1–0.3)
IMM GRANULOCYTES NFR BLD AUTO: 0.8 % — HIGH (ref 0.1–0.3)
KETONES UR-MCNC: 15 MG/DL
KETONES UR-MCNC: ABNORMAL MG/DL
LACTATE BLDV-MCNC: 5.9 MMOL/L — CRITICAL HIGH (ref 0.5–2)
LACTATE SERPL-SCNC: 4.5 MMOL/L — CRITICAL HIGH (ref 0.7–2)
LEUKOCYTE ESTERASE UR-ACNC: NEGATIVE — SIGNIFICANT CHANGE UP
LEUKOCYTE ESTERASE UR-ACNC: NEGATIVE — SIGNIFICANT CHANGE UP
LIDOCAIN IGE QN: 28 U/L — SIGNIFICANT CHANGE UP (ref 7–60)
LYMPHOCYTES # BLD AUTO: 0.36 K/UL — LOW (ref 1.2–3.4)
LYMPHOCYTES # BLD AUTO: 1.41 K/UL — SIGNIFICANT CHANGE UP (ref 1.2–3.4)
LYMPHOCYTES # BLD AUTO: 23.3 % — SIGNIFICANT CHANGE UP (ref 20.5–51.1)
LYMPHOCYTES # BLD AUTO: 6.3 % — LOW (ref 20.5–51.1)
MAGNESIUM SERPL-MCNC: 2.6 MG/DL — HIGH (ref 1.8–2.4)
MAGNESIUM SERPL-MCNC: 2.6 MG/DL — HIGH (ref 1.8–2.4)
MCHC RBC-ENTMCNC: 29.8 PG — SIGNIFICANT CHANGE UP (ref 27–31)
MCHC RBC-ENTMCNC: 30.4 PG — SIGNIFICANT CHANGE UP (ref 27–31)
MCHC RBC-ENTMCNC: 33.4 G/DL — SIGNIFICANT CHANGE UP (ref 32–37)
MCHC RBC-ENTMCNC: 34.5 G/DL — SIGNIFICANT CHANGE UP (ref 32–37)
MCV RBC AUTO: 88.2 FL — SIGNIFICANT CHANGE UP (ref 80–94)
MCV RBC AUTO: 89.3 FL — SIGNIFICANT CHANGE UP (ref 80–94)
METHADONE UR-MCNC: NEGATIVE — SIGNIFICANT CHANGE UP
MONOCYTES # BLD AUTO: 0.45 K/UL — SIGNIFICANT CHANGE UP (ref 0.1–0.6)
MONOCYTES # BLD AUTO: 0.67 K/UL — HIGH (ref 0.1–0.6)
MONOCYTES NFR BLD AUTO: 11.7 % — HIGH (ref 1.7–9.3)
MONOCYTES NFR BLD AUTO: 7.4 % — SIGNIFICANT CHANGE UP (ref 1.7–9.3)
MRSA PCR RESULT.: NEGATIVE — SIGNIFICANT CHANGE UP
NEUTROPHILS # BLD AUTO: 4.1 K/UL — SIGNIFICANT CHANGE UP (ref 1.4–6.5)
NEUTROPHILS # BLD AUTO: 4.64 K/UL — SIGNIFICANT CHANGE UP (ref 1.4–6.5)
NEUTROPHILS NFR BLD AUTO: 67.7 % — SIGNIFICANT CHANGE UP (ref 42.2–75.2)
NEUTROPHILS NFR BLD AUTO: 81 % — HIGH (ref 42.2–75.2)
NITRITE UR-MCNC: NEGATIVE — SIGNIFICANT CHANGE UP
NITRITE UR-MCNC: NEGATIVE — SIGNIFICANT CHANGE UP
NRBC # BLD: 0 /100 WBCS — SIGNIFICANT CHANGE UP (ref 0–0)
NRBC # BLD: 0 /100 WBCS — SIGNIFICANT CHANGE UP (ref 0–0)
OPIATES UR-MCNC: NEGATIVE — SIGNIFICANT CHANGE UP
OSMOLALITY SERPL: 318 MOS/KG — HIGH (ref 280–301)
OSMOLALITY SERPL: 343 MOS/KG — HIGH (ref 280–301)
OSMOLALITY UR: 546 MOS/KG — SIGNIFICANT CHANGE UP (ref 50–1200)
OXYCODONE UR-MCNC: NEGATIVE — SIGNIFICANT CHANGE UP
PCO2 BLDV: 51 MMHG — SIGNIFICANT CHANGE UP (ref 42–55)
PCP SPEC-MCNC: SIGNIFICANT CHANGE UP
PCP UR-MCNC: NEGATIVE — SIGNIFICANT CHANGE UP
PH BLDV: 7.33 — SIGNIFICANT CHANGE UP (ref 7.32–7.43)
PH UR: 5.5 — SIGNIFICANT CHANGE UP (ref 5–8)
PH UR: 6 — SIGNIFICANT CHANGE UP (ref 5–8)
PHOSPHATE SERPL-MCNC: 4 MG/DL — SIGNIFICANT CHANGE UP (ref 2.1–4.9)
PLATELET # BLD AUTO: 177 K/UL — SIGNIFICANT CHANGE UP (ref 130–400)
PLATELET # BLD AUTO: 199 K/UL — SIGNIFICANT CHANGE UP (ref 130–400)
PMV BLD: 12.4 FL — HIGH (ref 7.4–10.4)
PMV BLD: 12.5 FL — HIGH (ref 7.4–10.4)
PO2 BLDV: 61 MMHG — SIGNIFICANT CHANGE UP
POTASSIUM BLDV-SCNC: 4.4 MMOL/L — SIGNIFICANT CHANGE UP (ref 3.5–5.1)
POTASSIUM SERPL-MCNC: 3.4 MMOL/L — LOW (ref 3.5–5)
POTASSIUM SERPL-MCNC: 4.3 MMOL/L — SIGNIFICANT CHANGE UP (ref 3.5–5)
POTASSIUM SERPL-MCNC: 5.9 MMOL/L — HIGH (ref 3.5–5)
POTASSIUM SERPL-SCNC: 3.4 MMOL/L — LOW (ref 3.5–5)
POTASSIUM SERPL-SCNC: 4.3 MMOL/L — SIGNIFICANT CHANGE UP (ref 3.5–5)
POTASSIUM SERPL-SCNC: 5.9 MMOL/L — HIGH (ref 3.5–5)
POTASSIUM UR-SCNC: 12 MMOL/L — SIGNIFICANT CHANGE UP
PROPOXYPHENE QUALITATIVE URINE RESULT: NEGATIVE — SIGNIFICANT CHANGE UP
PROT ?TM UR-MCNC: 11 MG/DLG/24H — SIGNIFICANT CHANGE UP
PROT SERPL-MCNC: 6.3 G/DL — SIGNIFICANT CHANGE UP (ref 6–8)
PROT SERPL-MCNC: 6.5 G/DL — SIGNIFICANT CHANGE UP (ref 6–8)
PROT UR-MCNC: SIGNIFICANT CHANGE UP MG/DL
PROT UR-MCNC: SIGNIFICANT CHANGE UP MG/DL
PROT/CREAT UR-RTO: 0.3 RATIO — HIGH (ref 0–0.2)
RBC # BLD: 4.24 M/UL — LOW (ref 4.7–6.1)
RBC # BLD: 4.59 M/UL — LOW (ref 4.7–6.1)
RBC # FLD: 13.6 % — SIGNIFICANT CHANGE UP (ref 11.5–14.5)
RBC # FLD: 13.7 % — SIGNIFICANT CHANGE UP (ref 11.5–14.5)
RBC CASTS # UR COMP ASSIST: 0 /HPF — SIGNIFICANT CHANGE UP (ref 0–4)
SAO2 % BLDV: 90.6 % — SIGNIFICANT CHANGE UP
SODIUM SERPL-SCNC: 130 MMOL/L — LOW (ref 135–146)
SODIUM SERPL-SCNC: 141 MMOL/L — SIGNIFICANT CHANGE UP (ref 135–146)
SODIUM SERPL-SCNC: 147 MMOL/L — HIGH (ref 135–146)
SODIUM UR-SCNC: 30 MMOL/L — SIGNIFICANT CHANGE UP
SP GR SPEC: >1.03 — HIGH (ref 1–1.03)
SP GR SPEC: >1.03 — HIGH (ref 1–1.03)
SQUAMOUS # UR AUTO: 0 /HPF — SIGNIFICANT CHANGE UP (ref 0–5)
T4 AB SER-ACNC: 8.6 UG/DL — SIGNIFICANT CHANGE UP (ref 4.6–12)
THC UR QL: NEGATIVE — SIGNIFICANT CHANGE UP
TROPONIN T SERPL-MCNC: <0.01 NG/ML — SIGNIFICANT CHANGE UP
TSH SERPL-MCNC: 0.49 UIU/ML — SIGNIFICANT CHANGE UP (ref 0.27–4.2)
UROBILINOGEN FLD QL: 0.2 MG/DL — SIGNIFICANT CHANGE UP (ref 0.2–1)
UROBILINOGEN FLD QL: 0.2 MG/DL — SIGNIFICANT CHANGE UP (ref 0.2–1)
UUN UR-MCNC: 298 MG/DL — SIGNIFICANT CHANGE UP
WBC # BLD: 5.73 K/UL — SIGNIFICANT CHANGE UP (ref 4.8–10.8)
WBC # BLD: 6.06 K/UL — SIGNIFICANT CHANGE UP (ref 4.8–10.8)
WBC # FLD AUTO: 5.73 K/UL — SIGNIFICANT CHANGE UP (ref 4.8–10.8)
WBC # FLD AUTO: 6.06 K/UL — SIGNIFICANT CHANGE UP (ref 4.8–10.8)
WBC UR QL: 0 /HPF — SIGNIFICANT CHANGE UP (ref 0–5)

## 2023-08-07 PROCEDURE — 83605 ASSAY OF LACTIC ACID: CPT

## 2023-08-07 PROCEDURE — 84133 ASSAY OF URINE POTASSIUM: CPT

## 2023-08-07 PROCEDURE — 83735 ASSAY OF MAGNESIUM: CPT

## 2023-08-07 PROCEDURE — 95700 EEG CONT REC W/VID EEG TECH: CPT

## 2023-08-07 PROCEDURE — 83935 ASSAY OF URINE OSMOLALITY: CPT

## 2023-08-07 PROCEDURE — 93306 TTE W/DOPPLER COMPLETE: CPT

## 2023-08-07 PROCEDURE — 93970 EXTREMITY STUDY: CPT

## 2023-08-07 PROCEDURE — 94003 VENT MGMT INPAT SUBQ DAY: CPT

## 2023-08-07 PROCEDURE — 84100 ASSAY OF PHOSPHORUS: CPT

## 2023-08-07 PROCEDURE — 97166 OT EVAL MOD COMPLEX 45 MIN: CPT | Mod: GO

## 2023-08-07 PROCEDURE — 51703 INSERT BLADDER CATH COMPLEX: CPT

## 2023-08-07 PROCEDURE — 80235 DRUG ASSAY LACOSAMIDE: CPT

## 2023-08-07 PROCEDURE — 71045 X-RAY EXAM CHEST 1 VIEW: CPT

## 2023-08-07 PROCEDURE — C9254: CPT

## 2023-08-07 PROCEDURE — 99291 CRITICAL CARE FIRST HOUR: CPT

## 2023-08-07 PROCEDURE — 94760 N-INVAS EAR/PLS OXIMETRY 1: CPT

## 2023-08-07 PROCEDURE — 83036 HEMOGLOBIN GLYCOSYLATED A1C: CPT

## 2023-08-07 PROCEDURE — 82570 ASSAY OF URINE CREATININE: CPT

## 2023-08-07 PROCEDURE — 82962 GLUCOSE BLOOD TEST: CPT

## 2023-08-07 PROCEDURE — 82010 KETONE BODYS QUAN: CPT

## 2023-08-07 PROCEDURE — 82330 ASSAY OF CALCIUM: CPT

## 2023-08-07 PROCEDURE — 80053 COMPREHEN METABOLIC PANEL: CPT

## 2023-08-07 PROCEDURE — 87641 MR-STAPH DNA AMP PROBE: CPT

## 2023-08-07 PROCEDURE — 80307 DRUG TEST PRSMV CHEM ANLYZR: CPT

## 2023-08-07 PROCEDURE — 92526 ORAL FUNCTION THERAPY: CPT | Mod: GN

## 2023-08-07 PROCEDURE — 71045 X-RAY EXAM CHEST 1 VIEW: CPT | Mod: 26

## 2023-08-07 PROCEDURE — 82803 BLOOD GASES ANY COMBINATION: CPT

## 2023-08-07 PROCEDURE — 80048 BASIC METABOLIC PNL TOTAL CA: CPT

## 2023-08-07 PROCEDURE — 83010 ASSAY OF HAPTOGLOBIN QUANT: CPT

## 2023-08-07 PROCEDURE — 83690 ASSAY OF LIPASE: CPT

## 2023-08-07 PROCEDURE — 93971 EXTREMITY STUDY: CPT | Mod: RT

## 2023-08-07 PROCEDURE — 70450 CT HEAD/BRAIN W/O DYE: CPT | Mod: 26

## 2023-08-07 PROCEDURE — 87640 STAPH A DNA AMP PROBE: CPT

## 2023-08-07 PROCEDURE — 83880 ASSAY OF NATRIURETIC PEPTIDE: CPT

## 2023-08-07 PROCEDURE — 87075 CULTR BACTERIA EXCEPT BLOOD: CPT

## 2023-08-07 PROCEDURE — 83615 LACTATE (LD) (LDH) ENZYME: CPT

## 2023-08-07 PROCEDURE — 87040 BLOOD CULTURE FOR BACTERIA: CPT

## 2023-08-07 PROCEDURE — 85045 AUTOMATED RETICULOCYTE COUNT: CPT

## 2023-08-07 PROCEDURE — 84484 ASSAY OF TROPONIN QUANT: CPT

## 2023-08-07 PROCEDURE — 85730 THROMBOPLASTIN TIME PARTIAL: CPT

## 2023-08-07 PROCEDURE — 92610 EVALUATE SWALLOWING FUNCTION: CPT | Mod: GN

## 2023-08-07 PROCEDURE — 93005 ELECTROCARDIOGRAM TRACING: CPT

## 2023-08-07 PROCEDURE — 95714 VEEG EA 12-26 HR UNMNTR: CPT

## 2023-08-07 PROCEDURE — 87070 CULTURE OTHR SPECIMN AEROBIC: CPT

## 2023-08-07 PROCEDURE — C9113: CPT

## 2023-08-07 PROCEDURE — 71045 X-RAY EXAM CHEST 1 VIEW: CPT | Mod: 26,77

## 2023-08-07 PROCEDURE — 84436 ASSAY OF TOTAL THYROXINE: CPT

## 2023-08-07 PROCEDURE — 84540 ASSAY OF URINE/UREA-N: CPT

## 2023-08-07 PROCEDURE — 81003 URINALYSIS AUTO W/O SCOPE: CPT

## 2023-08-07 PROCEDURE — 85384 FIBRINOGEN ACTIVITY: CPT

## 2023-08-07 PROCEDURE — 0225U NFCT DS DNA&RNA 21 SARSCOV2: CPT

## 2023-08-07 PROCEDURE — 82607 VITAMIN B-12: CPT

## 2023-08-07 PROCEDURE — 85379 FIBRIN DEGRADATION QUANT: CPT

## 2023-08-07 PROCEDURE — 84156 ASSAY OF PROTEIN URINE: CPT

## 2023-08-07 PROCEDURE — 97116 GAIT TRAINING THERAPY: CPT | Mod: GP

## 2023-08-07 PROCEDURE — 80354 DRUG SCREENING FENTANYL: CPT

## 2023-08-07 PROCEDURE — 82150 ASSAY OF AMYLASE: CPT

## 2023-08-07 PROCEDURE — 85610 PROTHROMBIN TIME: CPT

## 2023-08-07 PROCEDURE — 85027 COMPLETE CBC AUTOMATED: CPT

## 2023-08-07 PROCEDURE — 97162 PT EVAL MOD COMPLEX 30 MIN: CPT | Mod: GP

## 2023-08-07 PROCEDURE — 70450 CT HEAD/BRAIN W/O DYE: CPT | Mod: MA

## 2023-08-07 PROCEDURE — 83930 ASSAY OF BLOOD OSMOLALITY: CPT

## 2023-08-07 PROCEDURE — 84132 ASSAY OF SERUM POTASSIUM: CPT

## 2023-08-07 PROCEDURE — 85025 COMPLETE CBC W/AUTO DIFF WBC: CPT

## 2023-08-07 PROCEDURE — 80177 DRUG SCRN QUAN LEVETIRACETAM: CPT

## 2023-08-07 PROCEDURE — 36415 COLL VENOUS BLD VENIPUNCTURE: CPT

## 2023-08-07 PROCEDURE — 84145 PROCALCITONIN (PCT): CPT

## 2023-08-07 PROCEDURE — 84295 ASSAY OF SERUM SODIUM: CPT

## 2023-08-07 PROCEDURE — 84300 ASSAY OF URINE SODIUM: CPT

## 2023-08-07 PROCEDURE — 81001 URINALYSIS AUTO W/SCOPE: CPT

## 2023-08-07 PROCEDURE — 87086 URINE CULTURE/COLONY COUNT: CPT

## 2023-08-07 PROCEDURE — 86022 PLATELET ANTIBODIES: CPT

## 2023-08-07 PROCEDURE — 84443 ASSAY THYROID STIM HORMONE: CPT

## 2023-08-07 RX ORDER — CHLORHEXIDINE GLUCONATE 213 G/1000ML
15 SOLUTION TOPICAL
Refills: 0 | Status: DISCONTINUED | OUTPATIENT
Start: 2023-08-07 | End: 2023-08-16

## 2023-08-07 RX ORDER — CHLORHEXIDINE GLUCONATE 213 G/1000ML
1 SOLUTION TOPICAL DAILY
Refills: 0 | Status: DISCONTINUED | OUTPATIENT
Start: 2023-08-07 | End: 2023-08-16

## 2023-08-07 RX ORDER — SODIUM CHLORIDE 9 MG/ML
1000 INJECTION, SOLUTION INTRAVENOUS
Refills: 0 | Status: DISCONTINUED | OUTPATIENT
Start: 2023-08-07 | End: 2023-08-10

## 2023-08-07 RX ORDER — SENNA PLUS 8.6 MG/1
2 TABLET ORAL AT BEDTIME
Refills: 0 | Status: DISCONTINUED | OUTPATIENT
Start: 2023-08-07 | End: 2023-08-16

## 2023-08-07 RX ORDER — INSULIN HUMAN 100 [IU]/ML
6 INJECTION, SOLUTION SUBCUTANEOUS
Qty: 100 | Refills: 0 | Status: DISCONTINUED | OUTPATIENT
Start: 2023-08-07 | End: 2023-08-07

## 2023-08-07 RX ORDER — INSULIN HUMAN 100 [IU]/ML
10 INJECTION, SOLUTION SUBCUTANEOUS
Qty: 100 | Refills: 0 | Status: DISCONTINUED | OUTPATIENT
Start: 2023-08-07 | End: 2023-08-07

## 2023-08-07 RX ORDER — INSULIN LISPRO 100/ML
5 VIAL (ML) SUBCUTANEOUS
Refills: 0 | Status: DISCONTINUED | OUTPATIENT
Start: 2023-08-07 | End: 2023-08-07

## 2023-08-07 RX ORDER — SODIUM CHLORIDE 9 MG/ML
1000 INJECTION, SOLUTION INTRAVENOUS ONCE
Refills: 0 | Status: COMPLETED | OUTPATIENT
Start: 2023-08-07 | End: 2023-08-07

## 2023-08-07 RX ORDER — SODIUM CHLORIDE 9 MG/ML
1000 INJECTION INTRAMUSCULAR; INTRAVENOUS; SUBCUTANEOUS
Refills: 0 | Status: DISCONTINUED | OUTPATIENT
Start: 2023-08-07 | End: 2023-08-07

## 2023-08-07 RX ORDER — SODIUM CHLORIDE 9 MG/ML
1000 INJECTION INTRAMUSCULAR; INTRAVENOUS; SUBCUTANEOUS ONCE
Refills: 0 | Status: COMPLETED | OUTPATIENT
Start: 2023-08-07 | End: 2023-08-07

## 2023-08-07 RX ORDER — ASPIRIN/CALCIUM CARB/MAGNESIUM 324 MG
81 TABLET ORAL DAILY
Refills: 0 | Status: DISCONTINUED | OUTPATIENT
Start: 2023-08-07 | End: 2023-08-16

## 2023-08-07 RX ORDER — POTASSIUM CHLORIDE 20 MEQ
20 PACKET (EA) ORAL
Refills: 0 | Status: COMPLETED | OUTPATIENT
Start: 2023-08-07 | End: 2023-08-07

## 2023-08-07 RX ORDER — NALOXONE HYDROCHLORIDE 4 MG/.1ML
0.04 SPRAY NASAL ONCE
Refills: 0 | Status: COMPLETED | OUTPATIENT
Start: 2023-08-07 | End: 2023-08-07

## 2023-08-07 RX ORDER — INSULIN HUMAN 100 [IU]/ML
4 INJECTION, SOLUTION SUBCUTANEOUS
Qty: 100 | Refills: 0 | Status: DISCONTINUED | OUTPATIENT
Start: 2023-08-07 | End: 2023-08-07

## 2023-08-07 RX ORDER — DEXTROSE 50 % IN WATER 50 %
25 SYRINGE (ML) INTRAVENOUS ONCE
Refills: 0 | Status: DISCONTINUED | OUTPATIENT
Start: 2023-08-07 | End: 2023-08-10

## 2023-08-07 RX ORDER — OFLOXACIN 0.3 %
1 DROPS OPHTHALMIC (EYE)
Refills: 0 | DISCHARGE

## 2023-08-07 RX ORDER — INSULIN HUMAN 100 [IU]/ML
2 INJECTION, SOLUTION SUBCUTANEOUS
Qty: 100 | Refills: 0 | Status: DISCONTINUED | OUTPATIENT
Start: 2023-08-07 | End: 2023-08-07

## 2023-08-07 RX ORDER — FENTANYL CITRATE 50 UG/ML
0.5 INJECTION INTRAVENOUS
Qty: 2500 | Refills: 0 | Status: DISCONTINUED | OUTPATIENT
Start: 2023-08-07 | End: 2023-08-10

## 2023-08-07 RX ORDER — DEXTROSE MONOHYDRATE, SODIUM CHLORIDE, AND POTASSIUM CHLORIDE 50; .745; 4.5 G/1000ML; G/1000ML; G/1000ML
1000 INJECTION, SOLUTION INTRAVENOUS
Refills: 0 | Status: DISCONTINUED | OUTPATIENT
Start: 2023-08-07 | End: 2023-08-08

## 2023-08-07 RX ORDER — DEXTROSE 50 % IN WATER 50 %
15 SYRINGE (ML) INTRAVENOUS ONCE
Refills: 0 | Status: DISCONTINUED | OUTPATIENT
Start: 2023-08-07 | End: 2023-08-10

## 2023-08-07 RX ORDER — LEVETIRACETAM 250 MG/1
750 TABLET, FILM COATED ORAL EVERY 12 HOURS
Refills: 0 | Status: DISCONTINUED | OUTPATIENT
Start: 2023-08-07 | End: 2023-08-07

## 2023-08-07 RX ORDER — SODIUM CHLORIDE 9 MG/ML
1000 INJECTION, SOLUTION INTRAVENOUS
Refills: 0 | Status: DISCONTINUED | OUTPATIENT
Start: 2023-08-07 | End: 2023-08-07

## 2023-08-07 RX ORDER — SODIUM CHLORIDE 9 MG/ML
250 INJECTION, SOLUTION INTRAVENOUS ONCE
Refills: 0 | Status: COMPLETED | OUTPATIENT
Start: 2023-08-07 | End: 2023-08-07

## 2023-08-07 RX ORDER — LEVETIRACETAM 250 MG/1
1000 TABLET, FILM COATED ORAL EVERY 12 HOURS
Refills: 0 | Status: DISCONTINUED | OUTPATIENT
Start: 2023-08-07 | End: 2023-08-16

## 2023-08-07 RX ORDER — LACOSAMIDE 50 MG/1
100 TABLET ORAL
Refills: 0 | Status: DISCONTINUED | OUTPATIENT
Start: 2023-08-07 | End: 2023-08-07

## 2023-08-07 RX ORDER — HEPARIN SODIUM 5000 [USP'U]/ML
5000 INJECTION INTRAVENOUS; SUBCUTANEOUS EVERY 12 HOURS
Refills: 0 | Status: DISCONTINUED | OUTPATIENT
Start: 2023-08-07 | End: 2023-08-09

## 2023-08-07 RX ORDER — INSULIN GLARGINE 100 [IU]/ML
20 INJECTION, SOLUTION SUBCUTANEOUS ONCE
Refills: 0 | Status: DISCONTINUED | OUTPATIENT
Start: 2023-08-07 | End: 2023-08-07

## 2023-08-07 RX ORDER — FENTANYL CITRATE 50 UG/ML
0.5 INJECTION INTRAVENOUS
Qty: 5000 | Refills: 0 | Status: DISCONTINUED | OUTPATIENT
Start: 2023-08-07 | End: 2023-08-07

## 2023-08-07 RX ORDER — PROPOFOL 10 MG/ML
20 INJECTION, EMULSION INTRAVENOUS
Qty: 500 | Refills: 0 | Status: DISCONTINUED | OUTPATIENT
Start: 2023-08-07 | End: 2023-08-07

## 2023-08-07 RX ORDER — PANTOPRAZOLE SODIUM 20 MG/1
40 TABLET, DELAYED RELEASE ORAL
Refills: 0 | Status: DISCONTINUED | OUTPATIENT
Start: 2023-08-07 | End: 2023-08-07

## 2023-08-07 RX ORDER — INSULIN HUMAN 100 [IU]/ML
6 INJECTION, SOLUTION SUBCUTANEOUS
Qty: 100 | Refills: 0 | Status: DISCONTINUED | OUTPATIENT
Start: 2023-08-07 | End: 2023-08-10

## 2023-08-07 RX ORDER — PROPOFOL 10 MG/ML
40 INJECTION, EMULSION INTRAVENOUS
Qty: 1000 | Refills: 0 | Status: DISCONTINUED | OUTPATIENT
Start: 2023-08-07 | End: 2023-08-08

## 2023-08-07 RX ORDER — PANTOPRAZOLE SODIUM 20 MG/1
40 TABLET, DELAYED RELEASE ORAL DAILY
Refills: 0 | Status: DISCONTINUED | OUTPATIENT
Start: 2023-08-07 | End: 2023-08-16

## 2023-08-07 RX ORDER — LACOSAMIDE 50 MG/1
100 TABLET ORAL EVERY 12 HOURS
Refills: 0 | Status: DISCONTINUED | OUTPATIENT
Start: 2023-08-07 | End: 2023-08-14

## 2023-08-07 RX ORDER — LEVETIRACETAM 250 MG/1
750 TABLET, FILM COATED ORAL
Refills: 0 | Status: DISCONTINUED | OUTPATIENT
Start: 2023-08-07 | End: 2023-08-07

## 2023-08-07 RX ORDER — INSULIN LISPRO 100/ML
VIAL (ML) SUBCUTANEOUS
Refills: 0 | Status: DISCONTINUED | OUTPATIENT
Start: 2023-08-07 | End: 2023-08-07

## 2023-08-07 RX ORDER — ALBUTEROL 90 UG/1
2 AEROSOL, METERED ORAL EVERY 6 HOURS
Refills: 0 | Status: DISCONTINUED | OUTPATIENT
Start: 2023-08-07 | End: 2023-08-09

## 2023-08-07 RX ORDER — INSULIN GLARGINE 100 [IU]/ML
20 INJECTION, SOLUTION SUBCUTANEOUS AT BEDTIME
Refills: 0 | Status: DISCONTINUED | OUTPATIENT
Start: 2023-08-07 | End: 2023-08-07

## 2023-08-07 RX ORDER — ONDANSETRON 8 MG/1
4 TABLET, FILM COATED ORAL EVERY 6 HOURS
Refills: 0 | Status: DISCONTINUED | OUTPATIENT
Start: 2023-08-07 | End: 2023-08-09

## 2023-08-07 RX ORDER — INSULIN HUMAN 100 [IU]/ML
8 INJECTION, SOLUTION SUBCUTANEOUS
Qty: 100 | Refills: 0 | Status: DISCONTINUED | OUTPATIENT
Start: 2023-08-07 | End: 2023-08-07

## 2023-08-07 RX ORDER — PREDNISOLONE SODIUM PHOSPHATE 1 %
1 DROPS OPHTHALMIC (EYE)
Refills: 0 | DISCHARGE

## 2023-08-07 RX ORDER — DEXTROSE 50 % IN WATER 50 %
12.5 SYRINGE (ML) INTRAVENOUS ONCE
Refills: 0 | Status: DISCONTINUED | OUTPATIENT
Start: 2023-08-07 | End: 2023-08-10

## 2023-08-07 RX ORDER — GLUCAGON INJECTION, SOLUTION 0.5 MG/.1ML
1 INJECTION, SOLUTION SUBCUTANEOUS ONCE
Refills: 0 | Status: DISCONTINUED | OUTPATIENT
Start: 2023-08-07 | End: 2023-08-10

## 2023-08-07 RX ADMIN — SODIUM CHLORIDE 1000 MILLILITER(S): 9 INJECTION INTRAMUSCULAR; INTRAVENOUS; SUBCUTANEOUS at 12:20

## 2023-08-07 RX ADMIN — SENNA PLUS 2 TABLET(S): 8.6 TABLET ORAL at 21:14

## 2023-08-07 RX ADMIN — DEXTROSE MONOHYDRATE, SODIUM CHLORIDE, AND POTASSIUM CHLORIDE 150 MILLILITER(S): 50; .745; 4.5 INJECTION, SOLUTION INTRAVENOUS at 14:29

## 2023-08-07 RX ADMIN — FENTANYL CITRATE 4 MICROGRAM(S)/KG/HR: 50 INJECTION INTRAVENOUS at 05:42

## 2023-08-07 RX ADMIN — CHLORHEXIDINE GLUCONATE 15 MILLILITER(S): 213 SOLUTION TOPICAL at 17:38

## 2023-08-07 RX ADMIN — PROPOFOL 19.2 MICROGRAM(S)/KG/MIN: 10 INJECTION, EMULSION INTRAVENOUS at 12:26

## 2023-08-07 RX ADMIN — SODIUM CHLORIDE 1000 MILLILITER(S): 9 INJECTION INTRAMUSCULAR; INTRAVENOUS; SUBCUTANEOUS at 08:17

## 2023-08-07 RX ADMIN — INSULIN HUMAN 8 UNIT(S)/HR: 100 INJECTION, SOLUTION SUBCUTANEOUS at 05:43

## 2023-08-07 RX ADMIN — PROPOFOL 19.2 MICROGRAM(S)/KG/MIN: 10 INJECTION, EMULSION INTRAVENOUS at 05:42

## 2023-08-07 RX ADMIN — SODIUM CHLORIDE 150 MILLILITER(S): 9 INJECTION INTRAMUSCULAR; INTRAVENOUS; SUBCUTANEOUS at 12:26

## 2023-08-07 RX ADMIN — INSULIN HUMAN 10 UNIT(S)/HR: 100 INJECTION, SOLUTION SUBCUTANEOUS at 08:03

## 2023-08-07 RX ADMIN — SODIUM CHLORIDE 1000 MILLILITER(S): 9 INJECTION, SOLUTION INTRAVENOUS at 05:42

## 2023-08-07 RX ADMIN — SODIUM CHLORIDE 200 MILLILITER(S): 9 INJECTION INTRAMUSCULAR; INTRAVENOUS; SUBCUTANEOUS at 08:03

## 2023-08-07 RX ADMIN — CHLORHEXIDINE GLUCONATE 1 APPLICATION(S): 213 SOLUTION TOPICAL at 14:26

## 2023-08-07 RX ADMIN — HEPARIN SODIUM 5000 UNIT(S): 5000 INJECTION INTRAVENOUS; SUBCUTANEOUS at 17:37

## 2023-08-07 RX ADMIN — INSULIN HUMAN 4 UNIT(S)/HR: 100 INJECTION, SOLUTION SUBCUTANEOUS at 12:26

## 2023-08-07 RX ADMIN — Medication 50 MILLIEQUIVALENT(S): at 16:05

## 2023-08-07 RX ADMIN — SODIUM CHLORIDE 1000 MILLILITER(S): 9 INJECTION INTRAMUSCULAR; INTRAVENOUS; SUBCUTANEOUS at 14:00

## 2023-08-07 RX ADMIN — NALOXONE HYDROCHLORIDE 0.04 MILLIGRAM(S): 4 SPRAY NASAL at 04:40

## 2023-08-07 RX ADMIN — Medication 2 MILLIGRAM(S): at 04:40

## 2023-08-07 RX ADMIN — PANTOPRAZOLE SODIUM 40 MILLIGRAM(S): 20 TABLET, DELAYED RELEASE ORAL at 12:19

## 2023-08-07 RX ADMIN — LEVETIRACETAM 400 MILLIGRAM(S): 250 TABLET, FILM COATED ORAL at 17:39

## 2023-08-07 RX ADMIN — LACOSAMIDE 100 MILLIGRAM(S): 50 TABLET ORAL at 17:37

## 2023-08-07 RX ADMIN — SODIUM CHLORIDE 750 MILLILITER(S): 9 INJECTION, SOLUTION INTRAVENOUS at 17:00

## 2023-08-07 RX ADMIN — Medication 50 MILLIEQUIVALENT(S): at 17:37

## 2023-08-07 NOTE — PROGRESS NOTE ADULT - ASSESSMENT
73M PMHx DM, HTN, HLD, h/o seizure disorders, h/o Bipolar Disorder, h/o BPH, GERD and h/o OM s/p multiple toe amputations presents to the ED for altered mental status. Admitted to ICU s/p intubation for airway protection iso AMS and management of HHS.    #DKA/HHS  -D5 NS  -insulin infusion  -trend sugars, lactate    #seizures  -EEG  -CTH  -c/w keppra   -check keppra and vimpat levels    Acute hypoxemic respiratory failure    Suspected seizure   Ho seizure disorder  HO bipolar disorder  Recently treated MSSA bacteremia secondary to toe OM  TYLER    PLAN:    CNS: EEG, CT head noted. Continue Keppra.  Check levels.  Drug screen.  Keep Propofol.  Hold Fentanyl     HEENT: Oral care, ETT care    PULMONARY:  HOB @ 45 degrees.  Vent changes as follows: Wean FiO2.  ARDS network MV settings.  Monitor PPL and DP.      CARDIOVASCULAR: Aggressive fluid resuscitation.  NS.  CE     GI: GI prophylaxis.  OG Feeding.  Lipase.      RENAL:  Follow up lytes.  Correct as needed.  BMP q 4-6 hr    INFECTIOUS DISEASE: Follow up cultures, Full RVP.  procal     HEMATOLOGICAL:  DVT prophylaxis.  Dimer     ENDOCRINE:  Follow up FS.  Insulin DKA protocol.  Hourly FS     MUSCULOSKELETAL: bedrest    MICU     73M PMHx DM, HTN, HLD, h/o seizure disorders, h/o Bipolar Disorder, h/o BPH, GERD and h/o OM s/p multiple toe amputations presents to the ED for altered mental status. Admitted to ICU s/p intubation for airway protection iso AMS and management of HHS.    #DKA/HHS  -D5 NS  -insulin infusion  -trend finger stick, lactate  -bmp q4-6    #AMS  #seizures  -EEG  focal and generalized slowing and interictal as described above  -CTH -ve  -c/w keppra   -check keppra and vimpat levels  -drug screen -ve  -r/o infection with procal, full rvp, cultures  -hold fentanyl but c/w propofol to reduce sedation    ----------------------------------------------------  # DVT prophylaxis: heparin  # GI prophylaxis: protonix  # Diet: NPO  # Activity: bed rest  # Code status: full code  # Disposition: acute  ----------------------------------------------------

## 2023-08-07 NOTE — H&P ADULT - NSHPLABSRESULTS_GEN_ALL_CORE
LABS:  cret                        12.9   6.06  )-----------( 199      ( 07 Aug 2023 03:15 )             37.4     08-07    130<L>  |  90<L>  |  36<H>  ----------------------------<  859<HH>  4.3   |  23  |  1.6<H>    Ca    9.6      07 Aug 2023 03:15  Phos  4.0     08-07  Mg     2.6     08-07    TPro  6.5  /  Alb  4.1  /  TBili  0.4  /  DBili  x   /  AST  13  /  ALT  16  /  AlkPhos  167<H>  08-07

## 2023-08-07 NOTE — H&P ADULT - HISTORY OF PRESENT ILLNESS
73M PMHx DM, HTN, HLD, h/o Bipolar Disorder, GERD and h/o OM s/p multiple toe amputations presents to the ED for altered mental status. As per wife, patient had a witnessed seizure-like activity at home that lasted for several minutes. EMS was called and found patient to be severely hyperglycemic. Patient was arousable while en route to the hospital. Wife reports that patient has not been acting himself for the last 2 weeks. He hid his medications and she is unsure if he has been taking any of them.    Vitals: Temp 98.7F, /74, HR 95, RR 20, SpO2 99% on RA    Labs: Hgb 12.9 (previously 12.5), Na 130 (142 corrected for hyperglycemia), Glucose 859, Beta-Hydroxy 1.0, Serum Osm 343, Cr 1.6 (previously 1.1)    In the ED:  - s/p 1L LR bolus  - started on Insulin drip  - s/p intubation for airway protection iso AMS    Admitted to MICU s/p intubation for airway protection and management of HHS. 73M PMHx DM, HTN, HLD, h/o seizure disorders, h/o Bipolar Disorder, h/o BPH, GERD and h/o OM s/p multiple toe amputations presents to the ED for altered mental status. As per wife, patient had a witnessed seizure-like activity at home that lasted for several minutes. EMS was called and found patient to be severely hyperglycemic. Patient was arousable while en route to the hospital. Wife reports that patient has not been acting himself for the last 2 weeks. States that he has a drug problem and takes medications for his chronic pain. He has also been hiding his medications and she is unsure if he has been taking any of them. As per patient's wife, his medications should be the same since last admission except for one possible change, but she is unsure of which one.    Vitals: Temp 98.7F, /74, HR 95, RR 20, SpO2 99% on RA    Labs: Hgb 12.9 (previously 12.5), Na 130 (142 corrected for hyperglycemia), Glucose 859, Beta-Hydroxy 1.0, Serum Osm 343, Cr 1.6 (previously 1.1)    In the ED:  - s/p 1L LR bolus  - started on Insulin drip  - s/p intubation for airway protection iso AMS and 1 episode of witness seizure in the ED  - s/p narcan nebulizer x1  - OG tube placed - 1700cc output stomach content/bile    Admitted to MICU s/p intubation for airway protection and management of HHS. 73M PMHx DM, HTN, HLD, h/o seizure disorders, h/o Bipolar Disorder, h/o BPH, GERD and h/o OM s/p multiple toe amputations presents to the ED for altered mental status. As per wife, patient had a witnessed seizure-like activity at home that lasted for several minutes. EMS was called and found patient to be severely hyperglycemic. Patient was arousable while en route to the hospital. Wife reports that patient has not been acting himself for the last 2 weeks. States that he has a drug problem and takes medications for his chronic pain. He has also been hiding his medications and she is unsure if he has been taking any of them. As per patient's wife, his medications should be the same since last admission except for one possible change, but she is unsure of which one.    Vitals: Temp 98.7F, /74, HR 95, RR 20, SpO2 99% on RA    Labs: Hgb 12.9 (previously 12.5), Na 130 (142 corrected for hyperglycemia), Glucose 859, Beta-Hydroxy 1.0, Serum Osm 343, Cr 1.6 (previously 1.1)    In the ED:  - s/p 1L LR bolus  - started on Insulin drip  - s/p intubation for airway protection iso AMS and 1 episode of witness seizure in the ED  - s/p Narcan nebulizer x1  - OG tube placed - 1700cc output stomach content/bile    Admitted to MICU s/p intubation for airway protection and management of HHS. 73M PMHx DM, HTN, HLD, h/o seizure disorders, h/o Bipolar Disorder, h/o BPH, GERD and h/o OM s/p multiple toe amputations presents to the ED for altered mental status. As per wife, patient had a witnessed seizure-like activity at home that lasted for several minutes. EMS was called and found patient to be severely hyperglycemic. Patient was arousable while en route to the hospital. Wife reports that patient has not been acting himself for the last 2 weeks. States that he has a drug problem and takes medications for his chronic pain. He has also been hiding his medications and she is unsure if he has been taking any of them. As per patient's wife, his medications should be the same since last admission except for one possible change, but she is unsure of which one.    Vitals: Temp 98.7F, /74, HR 95, RR 20, SpO2 99% on RA    Labs: Hgb 12.9 (previously 12.5), Na 130 (142 corrected for hyperglycemia), Glucose 859, Beta-Hydroxy 1.0, Serum Osm 343, Cr 1.6 (previously 1.1)    Imaging: CT Head non con: negative for acute intracranial pathology    In the ED:  - s/p 1L LR bolus  - started on Insulin drip  - s/p intubation for airway protection iso AMS and 1 episode of witness seizure in the ED  - s/p Narcan nebulizer x1  - OG tube placed - 1700cc output stomach content/bile    Admitted to MICU s/p intubation for airway protection and management of HHS.

## 2023-08-07 NOTE — ED PROVIDER NOTE - CLINICAL SUMMARY MEDICAL DECISION MAKING FREE TEXT BOX
73-year-old male presents to the ED for altered mentation.  Seizure-like activity witnessed at home.  EMS noted to have severe hyperglycemia.  Patient has been acting different from baseline for the past 2 weeks as per wife.  On my evaluation patient appears confused.  Vitals were reviewed by me and noted to be within acceptable parameters.  Initial fingerstick noted to be high.  Concern for DKA versus HHS.  During the course of the patient's stay patient had a witnessed seizure.  At that point given Ativan.  Unable to protect airway and patient noted to be hypoxic.  Decision was made to intubate to protect the airway as well as rule out intracranial processes.  Labs reviewed by me and noted to have pseudohyponatremia in the setting of hyperglycemia–859.  Mildly elevated anion gap at 17.  TYLER noted.  Elevated beta hydroxybutyrate.  pH normal.  In the setting of a normal pH and elevated glucose consideration for HHS.  Started on insulin drip and fluid bolus.  Additionally placed on propofol drip.  CTH neg for ich, midline shift, or hydrocephalus.  Admitted to ICU for HHS, hypoxemic respiratory failure, seizures.

## 2023-08-07 NOTE — ED ADULT NURSE NOTE - NSFALLHARMRISKINTERV_ED_ALL_ED
Assistance OOB with selected safe patient handling equipment if applicable/Assistance with ambulation/Communicate risk of Fall with Harm to all staff, patient, and family/Monitor for mental status changes and reorient to person, place, and time, as needed/Move patient closer to nursing station/within visual sight of ED staff/Provide visual cue: red socks, yellow wristband, yellow gown, etc/Reinforce activity limits and safety measures with patient and family/Toileting schedule using arm’s reach rule for commode and bathroom/Use of alarms - bed, stretcher, chair and/or video monitoring/Bed in lowest position, wheels locked, appropriate side rails in place/Call bell, personal items and telephone in reach/Instruct patient to call for assistance before getting out of bed/chair/stretcher/Non-slip footwear applied when patient is off stretcher/[place] to call system/Physically safe environment - no spills, clutter or unnecessary equipment/Purposeful Proactive Rounding/Room/bathroom lighting operational, light cord in reach

## 2023-08-07 NOTE — ED ADULT NURSE REASSESSMENT NOTE - NS ED NURSE REASSESS COMMENT FT1
Pt assessed, pupils bilaterally pinpointed. Pt given nebulized narcan. Pt woke up nauseous and AxOx2. Pt then seized and given ativan IV push. pt intubated to maintain airway. Pt started on propofol drip, fentanyl drip, and insulin drip. Pt taken to CT scan. VS stabilized.

## 2023-08-07 NOTE — PATIENT PROFILE ADULT - FALL HARM RISK - HARM RISK INTERVENTIONS

## 2023-08-07 NOTE — PROCEDURE NOTE - ADDITIONAL PROCEDURE DETAILS
Under sterile technique, successfully placed 18 fr coude catheter into urinary bladder with positive output of clear yellow urine. Balloon was inflated with 10cc of sterile water. Patient tolerated procedure well, no complications

## 2023-08-07 NOTE — CONSULT NOTE ADULT - ASSESSMENT
73M PMHx DM, HTN, HLD, h/o seizure disorders, h/o Bipolar Disorder, h/o BPH, GERD and h/o OM s/p multiple toe amputations admitted for hyperosmolar state with witnessed seizure-like activity at home that lasted for several minutes. Currently intubated and sedated and neurological exam was limited but negative for lateralizing signs. VEEG is on and was not showing ictal pattern at the time of exam. His seizures most likely related to HOS to hyperglycemia vs ASM non-compliance, polysubstance use.     Recommendations:     - c/w VEEG and f/u  - f/u Keppra and Vimpat levels  - c/w Keppra 1000 mg bid  - c/w Vimpat 100 mg bid    - f/u Urinetox  - c/w primary team treatment  - wean off the sedation tomorrow AM to re-assess his neurological status  - Neurology will follow       Discussed w attending Dr. Ontiveros  73M PMHx DM, HTN, HLD, h/o seizure disorders, h/o Bipolar Disorder, h/o BPH, GERD and h/o OM s/p multiple toe amputations admitted for hyperosmolar state with witnessed seizure-like activity at home that lasted for several minutes. Currently intubated and sedated and neurological exam was limited but negative for lateralizing signs. VEEG is on and was not showing ictal pattern at the time of exam. His seizures most likely related to HOS to hyperglycemia vs ASM non-compliance, polysubstance use.     Recommendations:     - c/w VEEG and f/u  - f/u Keppra and Vimpat levels  - c/w Keppra 1000 mg bid  - c/w Vimpat 100 mg bid    - f/u Urinetox  - c/w primary team treatment  - wean off the sedation tomorrow AM after VEEG reading when seizure was ruled out to re-assess his neurological status  - Neurology will follow       Discussed w attending Dr. Ontiveros

## 2023-08-07 NOTE — CONSULT NOTE ADULT - SUBJECTIVE AND OBJECTIVE BOX
Patient is a 73y old  Male who presents with a chief complaint of Altered Mental Status and Hyperglycemia (07 Aug 2023 05:05)      HPI:  73M PMHx DM, HTN, HLD, h/o seizure disorders, h/o Bipolar Disorder, h/o BPH, GERD and h/o OM s/p multiple toe amputations presents to the ED for altered mental status. As per wife, patient had a witnessed seizure-like activity at home that lasted for several minutes. EMS was called and found patient to be severely hyperglycemic. Patient was arousable while en route to the hospital. Wife reports that patient has not been acting himself for the last 2 weeks. States that he has a drug problem and takes medications for his chronic pain. He has also been hiding his medications and she is unsure if he has been taking any of them. As per patient's wife, his medications should be the same since last admission except for one possible change, but she is unsure of which one.    Vitals: Temp 98.7F, /74, HR 95, RR 20, SpO2 99% on RA    Labs: Hgb 12.9 (previously 12.5), Na 130 (142 corrected for hyperglycemia), Glucose 859, Beta-Hydroxy 1.0, Serum Osm 343, Cr 1.6 (previously 1.1)    Imaging: CT Head non con: negative for acute intracranial pathology    In the ED:  - s/p 1L LR bolus  - started on Insulin drip  - s/p intubation for airway protection iso AMS and 1 episode of witness seizure in the ED  - s/p Narcan nebulizer x1  - OG tube placed - 1700cc output stomach content/bile    Admitted to MICU s/p intubation for airway protection and management of HHS. (07 Aug 2023 05:05)      PAST MEDICAL & SURGICAL HISTORY:  DM (diabetes mellitus)  Type 2, 12/27/18 hgb aic  11.2      HTN (hypertension)      Dyslipidemia      Diabetic foot ulcer      Depression      GERD (gastroesophageal reflux disease)      Neuropathy, diabetic      Toe amputation status, right  (2008)      History of amputation of toe  LT -partial 1st toe          SOCIAL HX:  smoking+, drug abuse?                               FAMILY HISTORY:  Family history of lung cancer (Mother)    Family history of ischemic heart disease (IHD) (Father)    :  No known cardiovacular family hisotry     Review Of Systems:     All ROS are negative except per HPI       Allergies    No Known Allergies    Intolerances          PHYSICAL EXAM    ICU Vital Signs Last 24 Hrs  T(C): 37.1 (07 Aug 2023 02:58), Max: 37.1 (07 Aug 2023 02:58)  T(F): 98.7 (07 Aug 2023 02:58), Max: 98.7 (07 Aug 2023 02:58)  HR: 92 (07 Aug 2023 04:30) (90 - 96)  BP: 153/73 (07 Aug 2023 05:05) (153/73 - 210/100)  BP(mean): --  ABP: --  ABP(mean): --  RR: 14 (07 Aug 2023 05:05) (14 - 20)  SpO2: 100% (07 Aug 2023 05:05) (98% - 100%)    O2 Parameters below as of 07 Aug 2023 05:05  Patient On (Oxygen Delivery Method): ventilator    O2 Concentration (%): 100        CONSTITUTIONAL:  on mechanical ventilation  sedated    ENT:   ETT    CARDIAC:   Normal rate,   Regular rhythm.        RESPIRATORY:   no wheezing,   equal chest expansion  no crackles    GASTROINTESTINAL:  Abdomen soft,   Non-tender,   No guarding,   + BS      NEUROLOGICAL:   sedated    SKIN:   Skin normal color for race,   No evidence of rash.                LABS:                          12.9   6.06  )-----------( 199      ( 07 Aug 2023 03:15 )             37.4                                               08-07    130<L>  |  90<L>  |  36<H>  ----------------------------<  859<HH>  4.3   |  23  |  1.6<H>    Ca    9.6      07 Aug 2023 03:15  Phos  4.0     08-07  Mg     2.6     08-07    TPro  6.5  /  Alb  4.1  /  TBili  0.4  /  DBili  x   /  AST  13  /  ALT  16  /  AlkPhos  167<H>  08-07                                             Urinalysis Basic - ( 07 Aug 2023 06:40 )    Color: Yellow / Appearance: Clear / SG: >1.030 / pH: x  Gluc: x / Ketone: 15 mg/dL  / Bili: Negative / Urobili: 0.2 mg/dL   Blood: x / Protein: Trace mg/dL / Nitrite: Negative   Leuk Esterase: Negative / RBC: x / WBC x   Sq Epi: x / Non Sq Epi: x / Bacteria: x                                                  LIVER FUNCTIONS - ( 07 Aug 2023 03:15 )  Alb: 4.1 g/dL / Pro: 6.5 g/dL / ALK PHOS: 167 U/L / ALT: 16 U/L / AST: 13 U/L / GGT: x                                                                                               Mode: AC/ CMV (Assist Control/ Continuous Mandatory Ventilation)  RR (machine): 14  TV (machine): 450  FiO2: 100  PEEP: 8  ITime: 1  MAP: 13  PIP: 23                                      ABG - ( 07 Aug 2023 05:40 )  pH, Arterial: 7.45  pH, Blood: x     /  pCO2: 38    /  pO2: 216   / HCO3: 26    / Base Excess: 2.4   /  SaO2: x                   X-Rays                                                                            ECHO 60%      MEDICATIONS  (STANDING):  aspirin  chewable 81 milliGRAM(s) Oral daily  fentaNYL   Infusion. 0.5 MICROgram(s)/kG/Hr (4 mL/Hr) IV Continuous <Continuous>  insulin regular Infusion 8 Unit(s)/Hr (8 mL/Hr) IV Continuous <Continuous>  lacosamide IVPB 100 milliGRAM(s) IV Intermittent every 12 hours  levETIRAcetam  IVPB 750 milliGRAM(s) IV Intermittent every 12 hours  pantoprazole  Injectable 40 milliGRAM(s) IV Push daily  propofol Infusion 40 MICROgram(s)/kG/Min (19.2 mL/Hr) IV Continuous <Continuous>  senna 2 Tablet(s) Oral at bedtime  sodium chloride 0.9%. 1000 milliLiter(s) (150 mL/Hr) IV Continuous <Continuous>    MEDICATIONS  (PRN):  albuterol    90 MICROgram(s) HFA Inhaler 2 Puff(s) Inhalation every 6 hours PRN Bronchospasm  ondansetron Injectable 4 milliGRAM(s) IV Push every 6 hours PRN Nausea and/or Vomiting

## 2023-08-07 NOTE — ED PROVIDER NOTE - DIFFERENTIAL DIAGNOSIS
Intracranial hemorrhage, seizures, HHS, DKA, hypoxic respiratory failure, pneumonia Differential Diagnosis

## 2023-08-07 NOTE — ED PROVIDER NOTE - OBJECTIVE STATEMENT
73-year-old male past medical history of DM noncompliant with insulin, HLD, HTN, bipolar, GERD, numerous toe amputations, presents to the ED for altered mental status.  As per wife, patient had witnessed seizure-like activity at home that lasted for several minutes; EMS was called and found patient to be severely hyperglycemic.  Patient was arousable while en route to the hospital.  Wife reports that patient has not been acting himself for the last 2 weeks and has hid his medications and she is unsure if he has been taking any of them.

## 2023-08-07 NOTE — ED ADULT NURSE NOTE - NS ED NOTE ABUSE RESPONSE YN
Plan:  1. Take the FUrosemide very early in the morning   -- take 2 tablets for 3 days then take 1 tablet daily  -- the goal is to loose 10 lbs water weight   2. Continue the other meds, same doses for now.  3. Stress test To schedule this test please call [facility] at: [phone]   4. Follow up few days after the stress test  5. Electrocardiogram today   Yes

## 2023-08-07 NOTE — H&P ADULT - NSHPPHYSICALEXAM_GEN_ALL_CORE
VITALS:   Vital Signs Last 24 Hrs  T(C): 37.1 (07 Aug 2023 02:58), Max: 37.1 (07 Aug 2023 02:58)  T(F): 98.7 (07 Aug 2023 02:58), Max: 98.7 (07 Aug 2023 02:58)  HR: 96 (07 Aug 2023 05:05) (95 - 96)  BP: 153/73 (07 Aug 2023 05:05) (153/73 - 162/74)  RR: 14 (07 Aug 2023 05:05) (14 - 20)  SpO2: 100% (07 Aug 2023 05:05) (99% - 100%)    Parameters below as of 07 Aug 2023 05:05  Patient On (Oxygen Delivery Method): ventilator    O2 Concentration (%): 100  I&O's Summary    CAPILLARY BLOOD GLUCOSE      POCT Blood Glucose.: >600 mg/dL (07 Aug 2023 02:56)      PHYSICAL EXAM:  General: WN/WD NAD  HEENT: PERRLA, EOMI, moist mucous membranes  Neurology: A&Ox3, nonfocal, [place] x 4  Respiratory: CTA B/L, normal respiratory effort, no wheezes, crackles, rales  CV: RRR, S1S2, no murmurs, rubs or gallops  Abdominal: Soft, NT, ND +BS, Last BM  Extremities: No edema, + peripheral pulses  Incisions:   Tubes: VITALS:   Vital Signs Last 24 Hrs  T(C): 37.1 (07 Aug 2023 02:58), Max: 37.1 (07 Aug 2023 02:58)  T(F): 98.7 (07 Aug 2023 02:58), Max: 98.7 (07 Aug 2023 02:58)  HR: 96 (07 Aug 2023 05:05) (95 - 96)  BP: 153/73 (07 Aug 2023 05:05) (153/73 - 162/74)  RR: 14 (07 Aug 2023 05:05) (14 - 20)  SpO2: 100% (07 Aug 2023 05:05) (99% - 100%)    Parameters below as of 07 Aug 2023 05:05  Patient On (Oxygen Delivery Method): ventilator    O2 Concentration (%): 100  I&O's Summary    CAPILLARY BLOOD GLUCOSE      POCT Blood Glucose.: >600 mg/dL (07 Aug 2023 02:56)      PHYSICAL EXAM:  General: in NAD  HEENT: NCAT, dry mucous membranes  Neurology: sedated, intubated  Respiratory: CTA B/L, intubated, no wheezing or crackles appreciated  CV: RRR, S1S2, no murmurs, rubs or gallops  Abdominal: Soft, NT, ND, +BS, no guarding  Extremities: No edema, + peripheral pulses

## 2023-08-07 NOTE — ED PROVIDER NOTE - PROGRESS NOTE DETAILS
AE: Patient was initially responsive and answering questions after stimulation.  Shortly after patient stopped answering questions.  Patient then began experiencing seizure-like activity and was given Ativan.  Decision was made to intubate patient due to his acute change in mental status. AE: Patient was initially responsive and answering questions after stimulation.  Shortly after, patient stopped answering questions.  Patient then began experiencing seizure-like activity and was given Ativan.  Decision was made to intubate patient due to his acute change in mental status.

## 2023-08-07 NOTE — ED ADULT TRIAGE NOTE - NS ED NURSE AMBULANCES
Pt stops off TSH, T4 results she had drawn on her own at [facility] d/t cost savings. Pt will be needing a refill of her levothyroxine soon. [facility]. Patient is expecting a call back. (Lab results are placed in Dr. Scruggs's inbox to review.)     [facility]

## 2023-08-07 NOTE — CONSULT NOTE ADULT - CRITICAL CARE ATTENDING COMMENT
DM, HTN, HLD, h/o seizure disorders, h/o Bipolar Disorder, h/o BPH, GERD and h/o OM s/p multiple toe amputations admitted for hyperosmolar state with witnessed seizure-like activity. Patient is currently intubated and sedated on Propofol. CN are intact. Will perform VEEG. Follow up on levels. Will follow

## 2023-08-07 NOTE — CONSULT NOTE ADULT - SUBJECTIVE AND OBJECTIVE BOX
NEUROLOGY CONSULT    HPI:  "73M PMHx DM, HTN, HLD, h/o seizure disorders, h/o Bipolar Disorder, h/o BPH, GERD and h/o OM s/p multiple toe amputations presents to the ED for altered mental status. As per wife, patient had a witnessed seizure-like activity at home that lasted for several minutes. EMS was called and found patient to be severely hyperglycemic. Patient was arousable while en route to the hospital. Wife reports that patient has not been acting himself for the last 2 weeks. States that he has a drug problem and takes medications for his chronic pain. He has also been hiding his medications and she is unsure if he has been taking any of them. As per patient's wife, his medications should be the same since last admission except for one possible change, but she is unsure of which one.   ED: /74, HR 95, RR 20, SpO2 99% on RA  Labs: Na 130 (142 corrected for hyperglycemia), Glucose 859, Beta-Hydroxy 1.0, Serum Osm 343, Cr 1.6 (previously 1.1)  Imaging: CT Head non con: negative for acute intracranial pathology  Admitted to MICU s/p intubation for airway protection and management of HHS. (07 Aug 2023 05:05)"    Neurology was consulted for seizure management.     MEDICATIONS  Home Medications:  Actos 15 mg oral tablet: 1 orally once a day (17 May 2023 02:32)  Jardiance 25 mg oral tablet: 1 orally once a day (17 May 2023 02:32)  levETIRAcetam 750 mg oral tablet: 1 orally 2 times a day (17 May 2023 02:32)  Trulicity Pen 3 mg/0.5 mL subcutaneous solution: 3 subcutaneously every 7 days (17 May 2023 02:31)    MEDICATIONS  (STANDING):  aspirin  chewable 81 milliGRAM(s) Oral daily  chlorhexidine 2% Cloths 1 Application(s) Topical daily  dextrose 5% + sodium chloride 0.45% with potassium chloride 20 mEq/L 1000 milliLiter(s) (150 mL/Hr) IV Continuous <Continuous>  dextrose 5%. 1000 milliLiter(s) (100 mL/Hr) IV Continuous <Continuous>  dextrose 5%. 1000 milliLiter(s) (50 mL/Hr) IV Continuous <Continuous>  dextrose 50% Injectable 25 Gram(s) IV Push once  dextrose 50% Injectable 25 Gram(s) IV Push once  dextrose 50% Injectable 12.5 Gram(s) IV Push once  fentaNYL   Infusion. 0.5 MICROgram(s)/kG/Hr (4 mL/Hr) IV Continuous <Continuous>  glucagon  Injectable 1 milliGRAM(s) IntraMuscular once  heparin   Injectable 5000 Unit(s) SubCutaneous every 12 hours  insulin regular Infusion 4 Unit(s)/Hr (4 mL/Hr) IV Continuous <Continuous>  lacosamide IVPB 100 milliGRAM(s) IV Intermittent every 12 hours  lactated ringers Bolus 250 milliLiter(s) IV Bolus once  levETIRAcetam  IVPB 750 milliGRAM(s) IV Intermittent every 12 hours  pantoprazole  Injectable 40 milliGRAM(s) IV Push daily  potassium chloride  20 mEq/100 mL IVPB 20 milliEquivalent(s) IV Intermittent every 2 hours  propofol Infusion 40 MICROgram(s)/kG/Min (19.2 mL/Hr) IV Continuous <Continuous>  senna 2 Tablet(s) Oral at bedtime    MEDICATIONS  (PRN):  albuterol    90 MICROgram(s) HFA Inhaler 2 Puff(s) Inhalation every 6 hours PRN Bronchospasm  dextrose Oral Gel 15 Gram(s) Oral once PRN Blood Glucose LESS THAN 70 milliGRAM(s)/deciliter  ondansetron Injectable 4 milliGRAM(s) IV Push every 6 hours PRN Nausea and/or Vomiting     GEN: NAD, pleasant, cooperative    NEUROLOGICAL EXAMINATION:  Appearance is consistent with chronologic age, obese. Intubated and sedated under propofol. Non-responsive to verbal stimuli. Under VEEG: showing generalized diffuse attenuation w/o ictal pattern.   PERRLA, corneal b/l and oculocephalic reflex intact, gag and cough is intact  Face grimacing to painful stimuli, no asymmetry noted  Withdrawing in all extremities to noxious stimuli, no asymmetry noted. Babinski equivocal b/l (toe's amputations)  Meningeal signs are absent.            LABS:                        13.7   5.73  )-----------( 177      ( 07 Aug 2023 11:45 )             41.0     08-07    147<H>  |  104  |  35<H>  ----------------------------<  160<H>  3.4<L>   |  26  |  1.6<H>    Ca    9.9      07 Aug 2023 11:45  Phos  4.0     08-07  Mg     2.6     08-07    TPro  6.3  /  Alb  4.0  /  TBili  0.5  /  DBili  x   /  AST  15  /  ALT  15  /  AlkPhos  116<H>  08-07    Hemoglobin A1C:   Vitamin B12     CAPILLARY BLOOD GLUCOSE      POCT Blood Glucose.: 126 mg/dL (07 Aug 2023 13:56)      Urinalysis Basic - ( 07 Aug 2023 14:22 )    Color: Yellow / Appearance: Clear / SG: >1.030 / pH: x  Gluc: x / Ketone: Trace mg/dL  / Bili: Negative / Urobili: 0.2 mg/dL   Blood: x / Protein: Trace mg/dL / Nitrite: Negative   Leuk Esterase: Negative / RBC: x / WBC x   Sq Epi: x / Non Sq Epi: x / Bacteria: x        ABG - ( 07 Aug 2023 05:40 )  pH, Arterial: 7.45  pH, Blood: x     /  pCO2: 38    /  pO2: 216   / HCO3: 26    / Base Excess: 2.4   /  SaO2: x                   Microbiology:      RADIOLOGY, EKG AND ADDITIONAL TESTS: Reviewed.

## 2023-08-07 NOTE — CONSULT NOTE ADULT - ASSESSMENT
IMPRESSION:  Acute hypoxemic respiratory failure  Breakthrough seizure  Possible opiate overdose  DKA/HHS  Ho seizure disorder  HO bipolar disorder  Recently treated MSSA bacteremia secondary to toe OM      PLAN:    CNS: EEG, CT head noted, neurology, continue keppra     HEENT: Oral care, ETT care    PULMONARY:  HOB @ 45 degrees.  Vent changes as follows: lower Fio2 to 50%    CARDIOVASCULAR: MAP adequate, avoid volume overload    GI: GI prophylaxis.  OG Feeding.      RENAL:  Follow up lytes.  Correct as needed, continue LR at 150, monitor urine output, BMP q 4-6 hr    INFECTIOUS DISEASE: Follow up cultures, Full RVP,     HEMATOLOGICAL:  DVT prophylaxis.    ENDOCRINE:  Follow up FS.  Insulin DKA protocol    MUSCULOSKELETAL: bedrest    MICU         IMPRESSION:  Acute hypoxemic respiratory failure  Breakthrough seizure  Possible opiate overdose  DKA/HHS  Ho seizure disorder  HO bipolar disorder  Recently treated MSSA bacteremia secondary to toe OM  TYLER      PLAN:    CNS: EEG, CT head noted, neurology, continue keppra     HEENT: Oral care, ETT care    PULMONARY:  HOB @ 45 degrees.  Vent changes as follows: lower Fio2 to 50%    CARDIOVASCULAR: MAP adequate, avoid volume overload    GI: GI prophylaxis.  OG Feeding.      RENAL:  Follow up lytes.  Correct as needed, continue LR at 150, monitor urine output, BMP q 4-6 hr    INFECTIOUS DISEASE: Follow up cultures, Full RVP,     HEMATOLOGICAL:  DVT prophylaxis.    ENDOCRINE:  Follow up FS.  Insulin DKA protocol    MUSCULOSKELETAL: bedrest    MICU         IMPRESSION:    Acute hypoxemic respiratory failure  DKA/HHS  Suspected seizure   Ho seizure disorder  HO bipolar disorder  Recently treated MSSA bacteremia secondary to toe OM  TYLER    PLAN:    CNS: EEG, CT head noted. Continue Keppra.  Check levels.  Drug screen.  Keep Propofol.  Hold Fentanyl     HEENT: Oral care, ETT care    PULMONARY:  HOB @ 45 degrees.  Vent changes as follows: Wean FiO2.  ARDS network MV settings.  Monitor PPL and DP.      CARDIOVASCULAR: Aggressive fluid resuscitation.  NS.  CE     GI: GI prophylaxis.  OG Feeding.  Lipase.      RENAL:  Follow up lytes.  Correct as needed.  BMP q 4-6 hr    INFECTIOUS DISEASE: Follow up cultures, Full RVP.  procal     HEMATOLOGICAL:  DVT prophylaxis.  Dimer     ENDOCRINE:  Follow up FS.  Insulin DKA protocol.  Hourly FS     MUSCULOSKELETAL: bedrest    MICU

## 2023-08-07 NOTE — PROGRESS NOTE ADULT - SUBJECTIVE AND OBJECTIVE BOX
24H events:    Patient is a 73y old Male who presents with a chief complaint of Altered Mental Status and Hyperglycemia (07 Aug 2023 14:43)    Primary diagnosis of Acute respiratory failure with hypoxia      Day 1:  Day 2:  Day 3:     Today is hospital day . This morning patient was seen and examined at bedside, resting comfortably in bed.    No acute or major events overnight.    Code Status:    Family communication:  Contact date:  Name of person contacted:  Relationship to patient:  Communication details:  What matters most:    PAST MEDICAL & SURGICAL HISTORY  DM (diabetes mellitus)  Type 2, 12/27/18 hgb aic  11.2    HTN (hypertension)    Dyslipidemia    Diabetic foot ulcer    Depression    GERD (gastroesophageal reflux disease)    Neuropathy, diabetic    Toe amputation status, right  (2008)    History of amputation of toe  LT -partial 1st toe      SOCIAL HISTORY:  Social History:      ALLERGIES:  No Known Allergies    MEDICATIONS:  STANDING MEDICATIONS  aspirin  chewable 81 milliGRAM(s) Oral daily  chlorhexidine 0.12% Liquid 15 milliLiter(s) Oral Mucosa two times a day  chlorhexidine 2% Cloths 1 Application(s) Topical daily  dextrose 5% + sodium chloride 0.45% with potassium chloride 20 mEq/L 1000 milliLiter(s) IV Continuous <Continuous>  dextrose 5%. 1000 milliLiter(s) IV Continuous <Continuous>  dextrose 5%. 1000 milliLiter(s) IV Continuous <Continuous>  dextrose 50% Injectable 25 Gram(s) IV Push once  dextrose 50% Injectable 25 Gram(s) IV Push once  dextrose 50% Injectable 12.5 Gram(s) IV Push once  fentaNYL   Infusion. 0.5 MICROgram(s)/kG/Hr IV Continuous <Continuous>  glucagon  Injectable 1 milliGRAM(s) IntraMuscular once  heparin   Injectable 5000 Unit(s) SubCutaneous every 12 hours  insulin regular Infusion 6 Unit(s)/Hr IV Continuous <Continuous>  lacosamide IVPB 100 milliGRAM(s) IV Intermittent every 12 hours  levETIRAcetam  IVPB 1000 milliGRAM(s) IV Intermittent every 12 hours  pantoprazole  Injectable 40 milliGRAM(s) IV Push daily  propofol Infusion 40 MICROgram(s)/kG/Min IV Continuous <Continuous>  senna 2 Tablet(s) Oral at bedtime    PRN MEDICATIONS  albuterol    90 MICROgram(s) HFA Inhaler 2 Puff(s) Inhalation every 6 hours PRN  dextrose Oral Gel 15 Gram(s) Oral once PRN  ondansetron Injectable 4 milliGRAM(s) IV Push every 6 hours PRN    VITALS:   T(F): 98.2  HR: 67  BP: 133/64  RR: 17  SpO2: 100%    PHYSICAL EXAM:  GENERAL:   (  ) NAD, lying in bed comfortably     (  ) obtunded     (  ) lethargic     (  ) somnolent    HEAD:   (  ) Atraumatic     (  ) hematoma     (  ) laceration (specify location:       )     NECK:  (  ) Supple     (  ) neck stiffness     (  ) nuchal rigidity     (  )  no JVD     (  ) JVD present ( -- cm)    HEART:  Rate -->     (  ) normal rate     (  ) bradycardic     (  ) tachycardic  Rhythm -->     (  ) regular     (  ) regularly irregular     (  ) irregularly irregular  Murmurs -->     (  ) normal s1s2     (  ) systolic murmur     (  ) diastolic murmur     (  ) continuous murmur      (  ) S3 present     (  ) S4 present    LUNGS:   (  )Unlabored respirations     (  ) tachypnea  (  ) B/L air entry     (  ) decreased breath sounds in:  (location     )    (  ) no adventitious sound     (  ) crackles     (  ) wheezing      (  ) rhonchi      (specify location:       )  (  ) chest wall tenderness (specify location:       )    ABDOMEN:   (  ) Soft     (  ) tense   |   (  ) nondistended     (  ) distended   |   (  ) +BS     (  ) hypoactive bowel sounds     (  ) hyperactive bowel sounds  (  ) nontender     (  ) RUQ tenderness     (  ) RLQ tenderness     (  ) LLQ tenderness     (  ) epigastric tenderness     (  ) diffuse tenderness  (  ) Splenomegaly      (  ) Hepatomegaly      (  ) Jaundice     (  ) ecchymosis     EXTREMITIES: 2+ peripheral pulses bilaterally. No clubbing, cyanosis, or edema  (  ) Normal     (  ) Rash     (  ) ecchymosis     (  ) varicose veins      (  ) pitting edema     (  ) non-pitting edema   (  ) ulceration     (  ) gangrene:     (location:     )    NERVOUS SYSTEM:    (  ) A&Ox3     (  ) confused     (  ) lethargic  CN II-XII:     (  ) Intact     (  ) deficits found     (Specify:     )   Upper extremities:     (  ) no sensorimotor deficits     (  ) weakness     (  ) loss of proprioception/vibration     (  ) loss of touch/temperature (specify:    )  Lower extremities:     (  ) no sensorimotor deficits     (  ) weakness     (  ) loss of proprioception/vibration     (  ) loss of touch/temperature (specify:    )    SKIN:   (  ) No rashes or lesions     (  ) maculopapular rash     (  ) pustules     (  ) vesicles     (  ) ulcer     (  ) ecchymosis     (specify location:     )    [facility] score:    (  ) Indwelling Basilio Catheter:   Date insterted:    Reason (  ) Critical illness     (  ) urinary retention    (  ) Accurate Ins/Outs Monitoring     (  ) CMO patient    (  ) Central Line:   Date inserted:  Location: (  ) Right IJ     (  ) Left IJ     (  ) Right Fem     (  ) Left Fem    (  ) SPC        (  ) pigtail       (  ) PEG tube       (  ) colostomy       (  ) jejunostomy  (  ) U-Dall    LABS:                        13.7   5.73  )-----------( 177      ( 07 Aug 2023 11:45 )             41.0     08-07    141  |  107  |  35<H>  ----------------------------<  228<H>  5.9<H>   |  17  |  1.5    Ca    8.7      07 Aug 2023 16:00  Phos  4.0     08-07  Mg     2.6     08-07    TPro  6.3  /  Alb  4.0  /  TBili  0.5  /  DBili  x   /  AST  15  /  ALT  15  /  AlkPhos  116<H>  08-07      Urinalysis Basic - ( 07 Aug 2023 16:00 )    Color: x / Appearance: x / SG: x / pH: x  Gluc: 228 mg/dL / Ketone: x  / Bili: x / Urobili: x   Blood: x / Protein: x / Nitrite: x   Leuk Esterase: x / RBC: x / WBC x   Sq Epi: x / Non Sq Epi: x / Bacteria: x      ABG - ( 07 Aug 2023 05:40 )  pH, Arterial: 7.45  pH, Blood: x     /  pCO2: 38    /  pO2: 216   / HCO3: 26    / Base Excess: 2.4   /  SaO2: x                 Lactate, Blood: 4.5 mmol/L *HH* (08-07-23 @ 11:45)  Troponin T, Serum: <0.01 ng/mL (08-07-23 @ 11:45)      CARDIAC MARKERS ( 07 Aug 2023 11:45 )  x     / <0.01 ng/mL / x     / x     / x          RADIOLOGY:

## 2023-08-07 NOTE — H&P ADULT - ASSESSMENT
73M PMHx DM, HTN, HLD, h/o Bipolar Disorder, GERD and h/o OM s/p multiple toe amputations presents to the ED for altered mental status. 73M PMHx DM, HTN, HLD, h/o Bipolar Disorder, GERD and h/o OM s/p multiple toe amputations presents to the ED for altered mental status. Admitted to ICU s/p intubation for airway protection iso AMS and management of HHS.    ASSESSMENT:    HHS  TYLER    PLAN:    CNS: avoid CNS depressants     HEENT: oral care    PULMONARY: aspiration precautions, f/u AM CXR. ABG    CARDIOVASCULAR:    GI:    RENAL: monitor Cr and lytes, correct PRN    INFECTIOUS DISEASE:    HEMATOLOGICAL:     ENDOCRINE: insulin drip, monitor FS, change to subq insulin when appropriate    MUSCULOSKELETAL: Bedrest, fall precautions    GOC:    MICU monitoring 73M PMHx DM, HTN, HLD, h/o seizure disorders, h/o Bipolar Disorder, h/o BPH, GERD and h/o OM s/p multiple toe amputations presents to the ED for altered mental status. Admitted to ICU s/p intubation for airway protection iso AMS and management of HHS.    ***patient was nauseous after narcan neb in the ED, OG tube placed and 1700cc vomit/bile came out. Meds are being given IV. Please change back to PO when appropriate. Also, please contact pharmacy to see which medication was changed since discharge.***    ASSESSMENT:    HHS  TYLER on CKD  HO seizure disorder  HO DM  HO HTN  HO HLD  HO Bipolar Disorder  HO BPH  HO GERD  HO OM s/p multiple toe amputations b/l    PLAN:    CNS: on fentanyl and propofol for sedation, c/w anti-seizure medication, f/u neuro c/s, f/u EEG, daily SAT    HEENT: oral care    PULMONARY: Intubated, aspiration precautions, daily SBT, f/u AM CXR, f/u ABG    CARDIOVASCULAR: Previous ECHO 60% G1DD, c/w NS @150cc/hr    GI: OG tube to intermittent suction, NPO for now    RENAL: Monitor Na, change IVF to D5 1/2 NS if serum glucose <300, monitor Cr, BUN and lytes (K), correct PRN    INFECTIOUS DISEASE: was previously discharged on PO abx s/p digit amputation, WBC wnl, afebrile, monitor off Abx    HEMATOLOGICAL: monitor H&H    ENDOCRINE: insulin drip, monitor FS q1hr, change to subq insulin when appropriate or when resuming PO diet    MUSCULOSKELETAL: Bedrest, fall precautions    GOC: Full Code    MICU monitoring 73M PMHx DM, HTN, HLD, h/o seizure disorders, h/o Bipolar Disorder, h/o BPH, GERD and h/o OM s/p multiple toe amputations presents to the ED for altered mental status. Admitted to ICU s/p intubation for airway protection iso AMS and management of HHS.    ***patient was nauseous after Narcan neb in the ED, OG tube placed and 1700cc vomit/bile came out. Meds are being given IV. Please change back to PO when appropriate. Also, please contact pharmacy to see which medication was changed since discharge.***    ASSESSMENT:    HHS  TYLER on CKD  HO seizure disorder  HO DM  HO HTN  HO HLD  HO Bipolar Disorder  HO BPH  HO GERD  HO OM s/p multiple toe amputations b/l    PLAN:    CNS: on fentanyl and propofol for sedation, c/w anti-seizure medication, f/u neuro c/s, f/u EEG, daily SAT    HEENT: oral care    PULMONARY: Intubated, aspiration precautions, daily SBT, f/u AM CXR, f/u ABG    CARDIOVASCULAR: Previous ECHO 60% G1DD, c/w NS @150cc/hr    GI: OG tube to intermittent suction, NPO for now    RENAL: f/u UA, Monitor Na, change IVF to D5 1/2 NS if serum glucose <300, monitor Cr, BUN and lytes (K), correct PRN    INFECTIOUS DISEASE: was previously discharged on PO abx s/p digit amputation, WBC wnl, afebrile, f/u BCx, UCx, monitor off Abx    HEMATOLOGICAL: monitor H&H    ENDOCRINE: insulin drip, monitor FS q1hr, change to subq insulin when appropriate or when resuming PO diet, f/u TSH    MUSCULOSKELETAL: Bedrest, fall precautions    PSYCHIATRIC: h/o Bipolar, not on medication, does not follow psychiatrist, hides medication and refuses to take them, f/u behavioral health c/s    GOC: Full Code    MICU monitoring 73M PMHx DM, HTN, HLD, h/o seizure disorders, h/o Bipolar Disorder, h/o BPH, GERD and h/o OM s/p multiple toe amputations presents to the ED for altered mental status. Admitted to ICU s/p intubation for airway protection iso AMS and management of HHS.    ***patient was nauseous after Narcan neb in the ED, OG tube placed and 1700cc vomit/bile came out. Meds are being given IV. Please change back to PO when appropriate. Also, please contact pharmacy to see which medication was changed since discharge.***    ASSESSMENT:    HHS  TYLER on CKD  s/p Intubation for airway protection iso AMS  HO seizure disorder  HO DM  HO HTN  HO HLD  HO Bipolar Disorder  HO BPH  HO GERD  HO OM s/p multiple toe amputations b/l    PLAN:    CNS: on fentanyl and propofol for sedation, c/w anti-seizure medication, f/u neuro c/s, f/u EEG, daily SAT    HEENT: oral care    PULMONARY: Intubated, aspiration precautions, daily SBT, f/u AM CXR, f/u ABG    CARDIOVASCULAR: Previous ECHO 60% G1DD, c/w NS @150cc/hr    GI: OG tube to intermittent suction, NPO for now, resume PO diet when appropriate    RENAL: c/w NS @150cc/hr, change IVF to D5 1/2 NS if serum glucose <300, f/u UA, monitor Na, Cr, BUN and lytes (K), correct PRN    INFECTIOUS DISEASE: was previously discharged on PO abx s/p digit amputation, WBC wnl, afebrile, f/u BCx, UCx, f/u repeat lactate, monitor off Abx    HEMATOLOGICAL: monitor H&H    ENDOCRINE: c/w insulin drip, monitor FS q1hr, change to subq insulin when appropriate or when resuming PO diet, f/u TSH/T4, f/u Beta-hydroxy    MUSCULOSKELETAL: Bedrest, fall precautions    PSYCHIATRIC: h/o Bipolar, not on medication, does not follow psychiatrist, hides medication and refuses to take them, f/u behavioral health c/s    GOC: Full Code    MICU monitoring 73M PMHx DM, HTN, HLD, h/o seizure disorders, h/o Bipolar Disorder, h/o BPH, GERD and h/o OM s/p multiple toe amputations presents to the ED for altered mental status. Admitted to ICU s/p intubation for airway protection iso AMS and management of HHS.    ***patient was nauseous after Narcan neb in the ED, OG tube placed and 1700cc vomit/bile came out. Meds are being given IV. Please change back to PO when appropriate. Also, please contact pharmacy to see which medication was changed since discharge.***    ASSESSMENT:    HHS  TYLER on CKD  s/p Intubation for airway protection iso AMS  HO seizure disorder  HO DM  HO HTN  HO HLD  HO Bipolar Disorder  HO BPH  HO GERD  HO OM s/p multiple toe amputations b/l    PLAN:    CNS: CT head non con -ve for bleed, on fentanyl and propofol for sedation, c/w anti-seizure medication, f/u neuro c/s, f/u EEG, daily SAT    HEENT: oral care    PULMONARY: Intubated, aspiration precautions, daily SBT, f/u AM CXR, f/u ABG    CARDIOVASCULAR: Previous ECHO 60% G1DD, c/w NS @150cc/hr    GI: OG tube to intermittent suction, NPO for now, resume PO diet when appropriate    RENAL: c/w NS @150cc/hr, change IVF to D5 1/2 NS if serum glucose <300, f/u UA, monitor Na, Cr, BUN and lytes (K), correct PRN    INFECTIOUS DISEASE: was previously discharged on PO abx s/p digit amputation, WBC wnl, afebrile, f/u BCx, UCx, f/u repeat lactate, monitor off Abx    HEMATOLOGICAL: monitor H&H    ENDOCRINE: c/w insulin drip, monitor FS q1hr, change to subq insulin when appropriate or when resuming PO diet, f/u TSH/T4, f/u Beta-hydroxy    MUSCULOSKELETAL: Bedrest, fall precautions    PSYCHIATRIC: h/o Bipolar, not on medication, does not follow psychiatrist, hides medication and refuses to take them, f/u behavioral health c/s    GOC: Full Code    MICU monitoring

## 2023-08-07 NOTE — ED PROVIDER NOTE - CARE PLAN
1 Principal Discharge DX:	Acute respiratory failure with hypoxia  Secondary Diagnosis:	Type 2 diabetes mellitus with hyperosmolar hyperglycemic state (HHS)  Secondary Diagnosis:	Seizures

## 2023-08-07 NOTE — ED PROVIDER NOTE - PHYSICAL EXAMINATION
PHYSICAL EXAM: I have reviewed current vital signs.  GENERAL: Somnolent.  HEAD:  Normocephalic, atraumatic.  EYES: Pinpoint pupils.  NECK: Supple, no JVD.  CHEST/LUNG: Clear to auscultation bilaterally; no wheezes, rales, or rhonchi.  HEART: Regular rate and rhythm, normal S1 and S2; no murmurs, rubs, or gallops.  ABDOMEN: Soft, nondistended.  EXTREMITIES:  2+ peripheral pulses; no clubbing, cyanosis, or edema.  PSYCH: Somnolent.  NEUROLOGY: Responding to painful stimuli and answering some questions.  SKIN: Warm and dry.

## 2023-08-07 NOTE — ED ADULT TRIAGE NOTE - CHIEF COMPLAINT QUOTE
Pt BIBA for hyperglycemia, pt not compliant with insulin Pt BIBA for hyperglycemia, pt not compliant with insulin. Pt A&Ox2 in triage

## 2023-08-07 NOTE — CONSULT NOTE ADULT - ATTENDING COMMENTS
IMPRESSION:    Acute hypoxemic respiratory failure  DKA/HHS  Suspected seizure   Ho seizure disorder  HO bipolar disorder  Recently treated MSSA bacteremia secondary to toe OM  TYLER    Plan as oultined above

## 2023-08-08 LAB
A1C WITH ESTIMATED AVERAGE GLUCOSE RESULT: 13.3 % — HIGH (ref 4–5.6)
ALBUMIN SERPL ELPH-MCNC: 3.2 G/DL — LOW (ref 3.5–5.2)
ALP SERPL-CCNC: 76 U/L — SIGNIFICANT CHANGE UP (ref 30–115)
ALT FLD-CCNC: 11 U/L — SIGNIFICANT CHANGE UP (ref 0–41)
ANION GAP SERPL CALC-SCNC: 14 MMOL/L — SIGNIFICANT CHANGE UP (ref 7–14)
ANION GAP SERPL CALC-SCNC: 6 MMOL/L — LOW (ref 7–14)
AST SERPL-CCNC: 12 U/L — SIGNIFICANT CHANGE UP (ref 0–41)
BASE EXCESS BLDA CALC-SCNC: 0.8 MMOL/L — SIGNIFICANT CHANGE UP (ref -2–3)
BILIRUB SERPL-MCNC: 0.5 MG/DL — SIGNIFICANT CHANGE UP (ref 0.2–1.2)
BUN SERPL-MCNC: 29 MG/DL — HIGH (ref 10–20)
BUN SERPL-MCNC: 32 MG/DL — HIGH (ref 10–20)
CALCIUM SERPL-MCNC: 8.2 MG/DL — LOW (ref 8.4–10.5)
CALCIUM SERPL-MCNC: 8.5 MG/DL — SIGNIFICANT CHANGE UP (ref 8.4–10.5)
CHLORIDE SERPL-SCNC: 109 MMOL/L — SIGNIFICANT CHANGE UP (ref 98–110)
CHLORIDE SERPL-SCNC: 110 MMOL/L — SIGNIFICANT CHANGE UP (ref 98–110)
CO2 SERPL-SCNC: 19 MMOL/L — SIGNIFICANT CHANGE UP (ref 17–32)
CO2 SERPL-SCNC: 25 MMOL/L — SIGNIFICANT CHANGE UP (ref 17–32)
CREAT SERPL-MCNC: 1.1 MG/DL — SIGNIFICANT CHANGE UP (ref 0.7–1.5)
CREAT SERPL-MCNC: 1.3 MG/DL — SIGNIFICANT CHANGE UP (ref 0.7–1.5)
CULTURE RESULTS: NO GROWTH — SIGNIFICANT CHANGE UP
EGFR: 57 ML/MIN/1.73M2 — LOW
EGFR: 70 ML/MIN/1.73M2 — SIGNIFICANT CHANGE UP
ESTIMATED AVERAGE GLUCOSE: 335 MG/DL — HIGH (ref 68–114)
GLUCOSE BLDC GLUCOMTR-MCNC: 101 MG/DL — HIGH (ref 70–99)
GLUCOSE BLDC GLUCOMTR-MCNC: 111 MG/DL — HIGH (ref 70–99)
GLUCOSE BLDC GLUCOMTR-MCNC: 114 MG/DL — HIGH (ref 70–99)
GLUCOSE BLDC GLUCOMTR-MCNC: 134 MG/DL — HIGH (ref 70–99)
GLUCOSE BLDC GLUCOMTR-MCNC: 137 MG/DL — HIGH (ref 70–99)
GLUCOSE BLDC GLUCOMTR-MCNC: 222 MG/DL — HIGH (ref 70–99)
GLUCOSE BLDC GLUCOMTR-MCNC: 225 MG/DL — HIGH (ref 70–99)
GLUCOSE BLDC GLUCOMTR-MCNC: 225 MG/DL — HIGH (ref 70–99)
GLUCOSE SERPL-MCNC: 105 MG/DL — HIGH (ref 70–99)
GLUCOSE SERPL-MCNC: 177 MG/DL — HIGH (ref 70–99)
HCO3 BLDA-SCNC: 26 MMOL/L — SIGNIFICANT CHANGE UP (ref 21–28)
HCT VFR BLD CALC: 33.7 % — LOW (ref 42–52)
HGB BLD-MCNC: 11.4 G/DL — LOW (ref 14–18)
HOROWITZ INDEX BLDA+IHG-RTO: 40 — SIGNIFICANT CHANGE UP
LACTATE SERPL-SCNC: 1 MMOL/L — SIGNIFICANT CHANGE UP (ref 0.7–2)
MAGNESIUM SERPL-MCNC: 2.2 MG/DL — SIGNIFICANT CHANGE UP (ref 1.8–2.4)
MCHC RBC-ENTMCNC: 30.3 PG — SIGNIFICANT CHANGE UP (ref 27–31)
MCHC RBC-ENTMCNC: 33.8 G/DL — SIGNIFICANT CHANGE UP (ref 32–37)
MCV RBC AUTO: 89.6 FL — SIGNIFICANT CHANGE UP (ref 80–94)
NRBC # BLD: 0 /100 WBCS — SIGNIFICANT CHANGE UP (ref 0–0)
PCO2 BLDA: 43 MMHG — SIGNIFICANT CHANGE UP (ref 35–48)
PH BLDA: 7.39 — SIGNIFICANT CHANGE UP (ref 7.35–7.45)
PHOSPHATE SERPL-MCNC: 2.1 MG/DL — SIGNIFICANT CHANGE UP (ref 2.1–4.9)
PLATELET # BLD AUTO: 146 K/UL — SIGNIFICANT CHANGE UP (ref 130–400)
PMV BLD: 12.6 FL — HIGH (ref 7.4–10.4)
PO2 BLDA: 136 MMHG — HIGH (ref 83–108)
POTASSIUM SERPL-MCNC: 4.6 MMOL/L — SIGNIFICANT CHANGE UP (ref 3.5–5)
POTASSIUM SERPL-MCNC: 5.5 MMOL/L — HIGH (ref 3.5–5)
POTASSIUM SERPL-SCNC: 4.6 MMOL/L — SIGNIFICANT CHANGE UP (ref 3.5–5)
POTASSIUM SERPL-SCNC: 5.5 MMOL/L — HIGH (ref 3.5–5)
PROCALCITONIN SERPL-MCNC: 2.73 NG/ML — HIGH (ref 0.02–0.1)
PROT SERPL-MCNC: 5.1 G/DL — LOW (ref 6–8)
RAPID RVP RESULT: SIGNIFICANT CHANGE UP
RBC # BLD: 3.76 M/UL — LOW (ref 4.7–6.1)
RBC # FLD: 14.3 % — SIGNIFICANT CHANGE UP (ref 11.5–14.5)
SAO2 % BLDA: 99.9 % — HIGH (ref 94–98)
SARS-COV-2 RNA SPEC QL NAA+PROBE: SIGNIFICANT CHANGE UP
SODIUM SERPL-SCNC: 141 MMOL/L — SIGNIFICANT CHANGE UP (ref 135–146)
SODIUM SERPL-SCNC: 142 MMOL/L — SIGNIFICANT CHANGE UP (ref 135–146)
SPECIMEN SOURCE: SIGNIFICANT CHANGE UP
VIT B12 SERPL-MCNC: 716 PG/ML — SIGNIFICANT CHANGE UP (ref 232–1245)
WBC # BLD: 9.2 K/UL — SIGNIFICANT CHANGE UP (ref 4.8–10.8)
WBC # FLD AUTO: 9.2 K/UL — SIGNIFICANT CHANGE UP (ref 4.8–10.8)

## 2023-08-08 PROCEDURE — 71045 X-RAY EXAM CHEST 1 VIEW: CPT | Mod: 26

## 2023-08-08 PROCEDURE — 99291 CRITICAL CARE FIRST HOUR: CPT

## 2023-08-08 PROCEDURE — 93970 EXTREMITY STUDY: CPT | Mod: 26

## 2023-08-08 PROCEDURE — 95720 EEG PHY/QHP EA INCR W/VEEG: CPT

## 2023-08-08 PROCEDURE — 99232 SBSQ HOSP IP/OBS MODERATE 35: CPT

## 2023-08-08 PROCEDURE — 71045 X-RAY EXAM CHEST 1 VIEW: CPT | Mod: 26,77

## 2023-08-08 PROCEDURE — 93306 TTE W/DOPPLER COMPLETE: CPT | Mod: 26

## 2023-08-08 RX ORDER — SODIUM CHLORIDE 9 MG/ML
1000 INJECTION, SOLUTION INTRAVENOUS
Refills: 0 | Status: DISCONTINUED | OUTPATIENT
Start: 2023-08-08 | End: 2023-08-08

## 2023-08-08 RX ORDER — INSULIN LISPRO 100/ML
VIAL (ML) SUBCUTANEOUS
Refills: 0 | Status: DISCONTINUED | OUTPATIENT
Start: 2023-08-08 | End: 2023-08-15

## 2023-08-08 RX ORDER — INSULIN LISPRO 100/ML
7 VIAL (ML) SUBCUTANEOUS
Refills: 0 | Status: DISCONTINUED | OUTPATIENT
Start: 2023-08-08 | End: 2023-08-12

## 2023-08-08 RX ORDER — SODIUM ZIRCONIUM CYCLOSILICATE 10 G/10G
5 POWDER, FOR SUSPENSION ORAL ONCE
Refills: 0 | Status: COMPLETED | OUTPATIENT
Start: 2023-08-08 | End: 2023-08-08

## 2023-08-08 RX ORDER — DEXMEDETOMIDINE HYDROCHLORIDE IN 0.9% SODIUM CHLORIDE 4 UG/ML
0.2 INJECTION INTRAVENOUS
Qty: 200 | Refills: 0 | Status: DISCONTINUED | OUTPATIENT
Start: 2023-08-08 | End: 2023-08-08

## 2023-08-08 RX ORDER — DEXMEDETOMIDINE HYDROCHLORIDE IN 0.9% SODIUM CHLORIDE 4 UG/ML
4.18 INJECTION INTRAVENOUS
Qty: 400 | Refills: 0 | Status: DISCONTINUED | OUTPATIENT
Start: 2023-08-08 | End: 2023-08-08

## 2023-08-08 RX ORDER — DEXMEDETOMIDINE HYDROCHLORIDE IN 0.9% SODIUM CHLORIDE 4 UG/ML
0.2 INJECTION INTRAVENOUS
Qty: 400 | Refills: 0 | Status: DISCONTINUED | OUTPATIENT
Start: 2023-08-08 | End: 2023-08-10

## 2023-08-08 RX ORDER — PIPERACILLIN AND TAZOBACTAM 4; .5 G/20ML; G/20ML
3.38 INJECTION, POWDER, LYOPHILIZED, FOR SOLUTION INTRAVENOUS ONCE
Refills: 0 | Status: COMPLETED | OUTPATIENT
Start: 2023-08-08 | End: 2023-08-08

## 2023-08-08 RX ORDER — PIPERACILLIN AND TAZOBACTAM 4; .5 G/20ML; G/20ML
3.38 INJECTION, POWDER, LYOPHILIZED, FOR SOLUTION INTRAVENOUS EVERY 6 HOURS
Refills: 0 | Status: DISCONTINUED | OUTPATIENT
Start: 2023-08-09 | End: 2023-08-09

## 2023-08-08 RX ORDER — INSULIN GLARGINE 100 [IU]/ML
21 INJECTION, SOLUTION SUBCUTANEOUS EVERY MORNING
Refills: 0 | Status: DISCONTINUED | OUTPATIENT
Start: 2023-08-08 | End: 2023-08-12

## 2023-08-08 RX ORDER — SODIUM CHLORIDE 9 MG/ML
1000 INJECTION, SOLUTION INTRAVENOUS
Refills: 0 | Status: DISCONTINUED | OUTPATIENT
Start: 2023-08-08 | End: 2023-08-09

## 2023-08-08 RX ADMIN — INSULIN GLARGINE 21 UNIT(S): 100 INJECTION, SOLUTION SUBCUTANEOUS at 09:36

## 2023-08-08 RX ADMIN — PIPERACILLIN AND TAZOBACTAM 25 GRAM(S): 4; .5 INJECTION, POWDER, LYOPHILIZED, FOR SOLUTION INTRAVENOUS at 23:43

## 2023-08-08 RX ADMIN — LEVETIRACETAM 400 MILLIGRAM(S): 250 TABLET, FILM COATED ORAL at 18:26

## 2023-08-08 RX ADMIN — CHLORHEXIDINE GLUCONATE 1 APPLICATION(S): 213 SOLUTION TOPICAL at 13:01

## 2023-08-08 RX ADMIN — CHLORHEXIDINE GLUCONATE 15 MILLILITER(S): 213 SOLUTION TOPICAL at 05:49

## 2023-08-08 RX ADMIN — LACOSAMIDE 100 MILLIGRAM(S): 50 TABLET ORAL at 06:55

## 2023-08-08 RX ADMIN — LEVETIRACETAM 400 MILLIGRAM(S): 250 TABLET, FILM COATED ORAL at 05:49

## 2023-08-08 RX ADMIN — HEPARIN SODIUM 5000 UNIT(S): 5000 INJECTION INTRAVENOUS; SUBCUTANEOUS at 05:49

## 2023-08-08 RX ADMIN — Medication 4: at 15:51

## 2023-08-08 RX ADMIN — PANTOPRAZOLE SODIUM 40 MILLIGRAM(S): 20 TABLET, DELAYED RELEASE ORAL at 13:00

## 2023-08-08 RX ADMIN — LACOSAMIDE 100 MILLIGRAM(S): 50 TABLET ORAL at 18:26

## 2023-08-08 RX ADMIN — Medication 81 MILLIGRAM(S): at 13:00

## 2023-08-08 RX ADMIN — Medication 2 MILLIGRAM(S): at 15:10

## 2023-08-08 RX ADMIN — PIPERACILLIN AND TAZOBACTAM 25 GRAM(S): 4; .5 INJECTION, POWDER, LYOPHILIZED, FOR SOLUTION INTRAVENOUS at 18:26

## 2023-08-08 RX ADMIN — Medication 7 UNIT(S): at 15:50

## 2023-08-08 RX ADMIN — SODIUM ZIRCONIUM CYCLOSILICATE 5 GRAM(S): 10 POWDER, FOR SUSPENSION ORAL at 05:49

## 2023-08-08 RX ADMIN — Medication 7 UNIT(S): at 09:36

## 2023-08-08 RX ADMIN — PIPERACILLIN AND TAZOBACTAM 200 GRAM(S): 4; .5 INJECTION, POWDER, LYOPHILIZED, FOR SOLUTION INTRAVENOUS at 13:05

## 2023-08-08 RX ADMIN — Medication 4: at 13:28

## 2023-08-08 RX ADMIN — Medication 7 UNIT(S): at 13:27

## 2023-08-08 RX ADMIN — HEPARIN SODIUM 5000 UNIT(S): 5000 INJECTION INTRAVENOUS; SUBCUTANEOUS at 18:24

## 2023-08-08 RX ADMIN — Medication 4: at 09:37

## 2023-08-08 RX ADMIN — SENNA PLUS 2 TABLET(S): 8.6 TABLET ORAL at 21:48

## 2023-08-08 RX ADMIN — CHLORHEXIDINE GLUCONATE 15 MILLILITER(S): 213 SOLUTION TOPICAL at 18:24

## 2023-08-08 NOTE — PROGRESS NOTE ADULT - ASSESSMENT
IMPRESSION:    Acute hypoxemic respiratory failure  DKA / HHS  Suspected seizure   Ho seizure disorder  HO bipolar disorder  Recently treated MSSA bacteremia secondary to toe OM  TYLER improving     PLAN:    CNS: FU VEEG.  Continue Keppra and Vimpat.  FU levels.  Drug screen negative.  SAT.       HEENT: Oral care, ETT care    PULMONARY:  HOB @ 45 degrees.  Vent changes as follows: Wean FiO2.  Monitor PPL and DP.      CARDIOVASCULAR: Aggressive fluid resuscitation.  LR 75 cc per hour.  CE negative      GI: GI prophylaxis.  OG Feeding.  Lipase noted     RENAL:  Follow up lytes.  Correct as needed.      INFECTIOUS DISEASE: Follow up cultures, Full RVP.  procal 2.7.  Start Zosyn for now.  Nasal MRSA negative     HEMATOLOGICAL:  DVT prophylaxis.  Dimer noted.  LE duplex     ENDOCRINE:  Follow up FS.  Insulin protocol if needed     MUSCULOSKELETAL: bedrest    MICU             IMPRESSION:    Acute hypoxemic respiratory failure  DKA / HHS  Possible aspiration   Suspected seizure   Ho seizure disorder  HO bipolar disorder  Recently treated MSSA bacteremia secondary to toe OM  TYLER improving     PLAN:    CNS: FU VEEG.  Continue Keppra and Vimpat.  FU levels.  Drug screen negative.  SAT.       HEENT: Oral care, ETT care    PULMONARY:  HOB @ 45 degrees.  Vent changes as follows: Wean FiO2.  Monitor PPL and DP.      CARDIOVASCULAR: Aggressive fluid resuscitation.  LR 75 cc per hour.  CE negative      GI: GI prophylaxis.  OG Feeding.  Lipase noted     RENAL:  Follow up lytes.  Correct as needed.      INFECTIOUS DISEASE: Follow up cultures, Full RVP.  procal 2.7.  Start Zosyn for now.  Nasal MRSA negative     HEMATOLOGICAL:  DVT prophylaxis.  Dimer noted.  LE duplex     ENDOCRINE:  Follow up FS.  Insulin protocol if needed     MUSCULOSKELETAL: bedrest    MICU

## 2023-08-08 NOTE — PROGRESS NOTE ADULT - ASSESSMENT
73M PMHx DM, HTN, HLD, h/o seizure disorders, h/o Bipolar Disorder, h/o BPH, GERD and h/o OM s/p multiple toe amputations admitted for hyperosmolar state with witnessed seizure-like activity at home that lasted for several minutes. Seizure event likely provoked 2/2 hyperosmolar state. Currently intubated and sedated. Brainstem reflexes intact. EEG w/ generalized slowing.     Recommendations:   - Switch sedation from propofol to Precedex to better assess   - c/w vEEG for next 12-48hrs  - Get Keppra and Vimpat levels  - c/w Keppra 1000 mg bid  - c/w Vimpat 100 mg bid    - f/u Urinetox  - c/w primary team treatment for hyperglycemia     Neurology will follow #[id number]

## 2023-08-08 NOTE — PROGRESS NOTE ADULT - SUBJECTIVE AND OBJECTIVE BOX
Patient is a 73y old  Male who presents with a chief complaint of Altered Mental Status and Hyperglycemia (07 Aug 2023 20:03)        Over Night Events:  remains critically ill on MV.  Sedated.  off pressors.          ROS:     All ROS are negative except HPI         PHYSICAL EXAM    ICU Vital Signs Last 24 Hrs  T(C): 36.1 (08 Aug 2023 04:00), Max: 37.5 (07 Aug 2023 09:10)  T(F): 97 (08 Aug 2023 04:00), Max: 99.5 (07 Aug 2023 09:10)  HR: 72 (08 Aug 2023 07:00) (63 - 97)  BP: 103/52 (08 Aug 2023 07:00) (58/38 - 186/84)  BP(mean): 73 (08 Aug 2023 07:00) (44 - 121)  ABP: --  ABP(mean): --  RR: 14 (08 Aug 2023 07:00) (14 - 22)  SpO2: 100% (08 Aug 2023 07:00) (98% - 100%)    O2 Parameters below as of 08 Aug 2023 04:00  Patient On (Oxygen Delivery Method): ventilator    O2 Concentration (%): 50        CONSTITUTIONAL:  In  NAD    ENT:   Airway patent,   Mouth with normal mucosa.   ET     EYES:   Pupils equal,   Round and reactive to light.    CARDIAC:   Normal rate,   Regular rhythm.        RESPIRATORY:   No wheezing  Bilateral BS  Normal chest expansion  Not tachypneic,  No use of accessory muscles    GASTROINTESTINAL:  Abdomen soft,   Non-tender,   No guarding,   + BS    MUSCULOSKELETAL:   Range of motion is not limited,  No clubbing, cyanosis    NEUROLOGICAL:   Sedated  moves all extremities     SKIN:   Skin normal color for race,   No evidence of rash.        08-07-23 @ 07:01  -  08-08-23 @ 07:00  --------------------------------------------------------  IN:    dextrose 5% + sodium chloride 0.45% w/ Additives: 2250 mL    dextrose 5% + sodium chloride 0.9%: 300 mL    FentaNYL: 11.4 mL    Insulin: 18 mL    Insulin: 22 mL    Insulin: 16 mL    Insulin: 12 mL    Insulin: 45 mL    IV PiggyBack: 350 mL    Lactated Ringers Bolus: 250 mL    Propofol: 390 mL    sodium chloride 0.9%: 900 mL    Sodium Chloride 0.9% Bolus: 2000 mL  Total IN: 6564.4 mL    OUT:    Indwelling Catheter - Urethral (mL): 1500 mL  Total OUT: 1500 mL    Total NET: 5064.4 mL          LABS:                            11.4   9.20  )-----------( 146      ( 08 Aug 2023 05:35 )             33.7                                               08-08    141  |  110  |  29<H>  ----------------------------<  105<H>  4.6   |  25  |  1.1    Creatinine Trend  BUN 29, Cr 1.1, (08-08-23 @ 05:35)  Creatinine Trend  BUN 32, Cr 1.3, (08-08-23 @ 02:01)  Creatinine Trend  BUN 35, Cr 1.5, (08-07-23 @ 16:00)  Creatinine Trend  BUN 35, Cr 1.6, (08-07-23 @ 11:45)  Creatinine Trend  BUN 36, Cr 1.6, (08-07-23 @ 03:15)      Ca    8.2<L>      08 Aug 2023 05:35  Phos  2.1     08-08  Mg     2.2     08-08    TPro  5.1<L>  /  Alb  3.2<L>  /  TBili  0.5  /  DBili  x   /  AST  12  /  ALT  11  /  AlkPhos  76  08-08                                             Urinalysis Basic - ( 08 Aug 2023 05:35 )    Color: x / Appearance: x / SG: x / pH: x  Gluc: 105 mg/dL / Ketone: x  / Bili: x / Urobili: x   Blood: x / Protein: x / Nitrite: x   Leuk Esterase: x / RBC: x / WBC x   Sq Epi: x / Non Sq Epi: x / Bacteria: x        CARDIAC MARKERS ( 07 Aug 2023 11:45 )  x     / <0.01 ng/mL / x     / x     / x                                                LIVER FUNCTIONS - ( 08 Aug 2023 05:35 )  Alb: 3.2 g/dL / Pro: 5.1 g/dL / ALK PHOS: 76 U/L / ALT: 11 U/L / AST: 12 U/L / GGT: x                                                                                               Mode: AC/ CMV (Assist Control/ Continuous Mandatory Ventilation)  RR (machine): 14  TV (machine): 450  FiO2: 40  PEEP: 8  ITime: 1  MAP: 11  PIP: 21                                      ABG - ( 08 Aug 2023 03:20 )  pH, Arterial: 7.39  pH, Blood: x     /  pCO2: 43    /  pO2: 136   / HCO3: 26    / Base Excess: 0.8   /  SaO2: 99.9    Lac 1.2               MEDICATIONS  (STANDING):  aspirin  chewable 81 milliGRAM(s) Oral daily  chlorhexidine 0.12% Liquid 15 milliLiter(s) Oral Mucosa two times a day  chlorhexidine 2% Cloths 1 Application(s) Topical daily  dextrose 5% + sodium chloride 0.9%. 1000 milliLiter(s) (150 mL/Hr) IV Continuous <Continuous>  dextrose 5%. 1000 milliLiter(s) (50 mL/Hr) IV Continuous <Continuous>  dextrose 5%. 1000 milliLiter(s) (100 mL/Hr) IV Continuous <Continuous>  dextrose 50% Injectable 25 Gram(s) IV Push once  dextrose 50% Injectable 25 Gram(s) IV Push once  dextrose 50% Injectable 12.5 Gram(s) IV Push once  fentaNYL   Infusion. 0.5 MICROgram(s)/kG/Hr (4 mL/Hr) IV Continuous <Continuous>  glucagon  Injectable 1 milliGRAM(s) IntraMuscular once  heparin   Injectable 5000 Unit(s) SubCutaneous every 12 hours  insulin glargine Injectable (LANTUS) 21 Unit(s) SubCutaneous every morning  insulin lispro (ADMELOG) corrective regimen sliding scale   SubCutaneous three times a day before meals  insulin lispro Injectable (ADMELOG) 7 Unit(s) SubCutaneous before breakfast  insulin lispro Injectable (ADMELOG) 7 Unit(s) SubCutaneous before lunch  insulin lispro Injectable (ADMELOG) 7 Unit(s) SubCutaneous before dinner  insulin regular Infusion 6 Unit(s)/Hr (6 mL/Hr) IV Continuous <Continuous>  lacosamide IVPB 100 milliGRAM(s) IV Intermittent every 12 hours  levETIRAcetam  IVPB 1000 milliGRAM(s) IV Intermittent every 12 hours  pantoprazole  Injectable 40 milliGRAM(s) IV Push daily  propofol Infusion 40 MICROgram(s)/kG/Min (19.2 mL/Hr) IV Continuous <Continuous>  senna 2 Tablet(s) Oral at bedtime    MEDICATIONS  (PRN):  albuterol    90 MICROgram(s) HFA Inhaler 2 Puff(s) Inhalation every 6 hours PRN Bronchospasm  dextrose Oral Gel 15 Gram(s) Oral once PRN Blood Glucose LESS THAN 70 milliGRAM(s)/deciliter  ondansetron Injectable 4 milliGRAM(s) IV Push every 6 hours PRN Nausea and/or Vomiting      New X-rays reviewed:                                                                                  ECHO

## 2023-08-08 NOTE — PROGRESS NOTE ADULT - ASSESSMENT
73M PMHx DM, HTN, HLD, h/o seizure disorders, h/o Bipolar Disorder, h/o BPH, GERD and h/o OM s/p multiple toe amputations presents to the ED for altered mental status. Admitted to ICU s/p intubation for airway protection iso AMS and management of HHS.    #DKA/HHS  -insulin infusion  -trend finger stick, lactate  -bmp q4-6  -switch D5-NS to LR 75cc/hr    #AMS  #seizures  -EEG  focal and generalized slowing and interictal as described above  -CTH -ve  -c/w keppra   -check keppra and vimpat levels  -drug screen -ve  -r/o infection with procal, full rvp, cultures  -spontaneous awakening trial  -switch propofol to precedex after VEEG discontinuatio  -ABG qd  -CXR qd  -BMP qd    #PNA  -zosyn  -bcx    -duplex  -daily CBC, CMP    ----------------------------------------------------  # DVT prophylaxis: heparin  # GI prophylaxis: protonix  # Diet: NPO  # Activity: bed rest  # Code status: full code  # Disposition: acute  ----------------------------------------------------       73M PMHx DM, HTN, HLD, h/o seizure disorders, h/o Bipolar Disorder, h/o BPH, GERD and h/o OM s/p multiple toe amputations presents to the ED for altered mental status. Admitted to ICU s/p intubation for airway protection iso AMS and management of HHS.    #DKA/HHS  -insulin infusion  -trend finger stick, lactate  -bmp q4-6  -switch D5-NS to LR 75cc/hr    #AMS  #seizures  -EEG  focal and generalized slowing and interictal as described above  -CTH -ve  -c/w keppra   -check keppra and vimpat levels  -drug screen -ve  -r/o infection with procal, full rvp, cultures  -spontaneous awakening trial  -per neuro switched propofol to precedex  -ABG qd  -CXR qd  -BMP qd    #PNA  -zosyn  -bcx    -duplex  -daily CBC, CMP    ----------------------------------------------------  # DVT prophylaxis: heparin  # GI prophylaxis: protonix  # Diet: NPO  # Activity: bed rest  # Code status: full code  # Disposition: acute  ----------------------------------------------------

## 2023-08-08 NOTE — EEG REPORT - NS EEG TEXT BOX
Epilepsy Attending Note:     JOSÉ MANUEL PORTER    73y Male  MRN [id number]    Vital Signs Last 24 Hrs  T(C): 37.1 (08 Aug 2023 08:00), Max: 37.2 (07 Aug 2023 20:00)  T(F): 98.7 (08 Aug 2023 08:00), Max: 98.9 (07 Aug 2023 20:00)  HR: 78 (08 Aug 2023 09:00) (63 - 83)  BP: 131/60 (08 Aug 2023 09:00) (58/38 - 186/84)  BP(mean): 87 (08 Aug 2023 09:00) (44 - 121)  RR: 26 (08 Aug 2023 09:00) (13 - 26)  SpO2: 100% (08 Aug 2023 09:00) (100% - 100%)    Parameters below as of 08 Aug 2023 08:00  Patient On (Oxygen Delivery Method): ventilator    O2 Concentration (%): 50                          11.4   9.20  )-----------( 146      ( 08 Aug 2023 05:35 )             33.7       08-    141  |  110  |  29<H>  ----------------------------<  105<H>  4.6   |  25  |  1.1    Ca    8.2<L>      08 Aug 2023 05:35  Phos  2.1     08-  Mg     2.2         TPro  5.1<L>  /  Alb  3.2<L>  /  TBili  0.5  /  DBili  x   /  AST  12  /  ALT  11  /  AlkPhos  76  -08      MEDICATIONS  (STANDING):  aspirin  chewable 81 milliGRAM(s) Oral daily  chlorhexidine 0.12% Liquid 15 milliLiter(s) Oral Mucosa two times a day  chlorhexidine 2% Cloths 1 Application(s) Topical daily  dextrose 5%. 1000 milliLiter(s) (50 mL/Hr) IV Continuous <Continuous>  dextrose 5%. 1000 milliLiter(s) (100 mL/Hr) IV Continuous <Continuous>  dextrose 50% Injectable 25 Gram(s) IV Push once  dextrose 50% Injectable 25 Gram(s) IV Push once  dextrose 50% Injectable 12.5 Gram(s) IV Push once  fentaNYL   Infusion. 0.5 MICROgram(s)/kG/Hr (4 mL/Hr) IV Continuous <Continuous>  glucagon  Injectable 1 milliGRAM(s) IntraMuscular once  heparin   Injectable 5000 Unit(s) SubCutaneous every 12 hours  insulin glargine Injectable (LANTUS) 21 Unit(s) SubCutaneous every morning  insulin lispro (ADMELOG) corrective regimen sliding scale   SubCutaneous three times a day before meals  insulin lispro Injectable (ADMELOG) 7 Unit(s) SubCutaneous before breakfast  insulin lispro Injectable (ADMELOG) 7 Unit(s) SubCutaneous before lunch  insulin lispro Injectable (ADMELOG) 7 Unit(s) SubCutaneous before dinner  insulin regular Infusion 6 Unit(s)/Hr (6 mL/Hr) IV Continuous <Continuous>  lacosamide IVPB 100 milliGRAM(s) IV Intermittent every 12 hours  lactated ringers. 1000 milliLiter(s) (75 mL/Hr) IV Continuous <Continuous>  levETIRAcetam  IVPB 1000 milliGRAM(s) IV Intermittent every 12 hours  pantoprazole  Injectable 40 milliGRAM(s) IV Push daily  propofol Infusion 40 MICROgram(s)/kG/Min (19.2 mL/Hr) IV Continuous <Continuous>  senna 2 Tablet(s) Oral at bedtime    MEDICATIONS  (PRN):  albuterol    90 MICROgram(s) HFA Inhaler 2 Puff(s) Inhalation every 6 hours PRN Bronchospasm  dextrose Oral Gel 15 Gram(s) Oral once PRN Blood Glucose LESS THAN 70 milliGRAM(s)/deciliter  ondansetron Injectable 4 milliGRAM(s) IV Push every 6 hours PRN Nausea and/or Vomiting            VEEG in the last 24 hours: Intubated, sedated    Background - mainly sedated and sleep recording that is low in amplitude, shows symmetry, mildly reactive    Focal and generalized slowin. moderate generalized slowing  2. no focal slowing    Interictal activity - none    Events - none    Seizures - none    Impression: Abnormal VEEG as above    Plan - per neurology team

## 2023-08-08 NOTE — PROGRESS NOTE ADULT - SUBJECTIVE AND OBJECTIVE BOX
24H events:    Patient is a 73y old Male who presents with a chief complaint of Altered Mental Status and Hyperglycemia (08 Aug 2023 08:07)    Primary diagnosis of Acute respiratory failure with hypoxia      Day 1:  Day 2:  Day 3:     Today is hospital day 1d. This morning patient was seen and examined at bedside, resting comfortably in bed.    No acute or major events overnight.    Code Status:    Family communication:  Contact date:  Name of person contacted:  Relationship to patient:  Communication details:  What matters most:    PAST MEDICAL & SURGICAL HISTORY  DM (diabetes mellitus)  Type 2, 12/27/18 hgb aic  11.2    HTN (hypertension)    Dyslipidemia    Diabetic foot ulcer    Depression    GERD (gastroesophageal reflux disease)    Neuropathy, diabetic    Toe amputation status, right  (2008)    History of amputation of toe  LT -partial 1st toe      SOCIAL HISTORY:  Social History:      ALLERGIES:  No Known Allergies    MEDICATIONS:  STANDING MEDICATIONS  aspirin  chewable 81 milliGRAM(s) Oral daily  chlorhexidine 0.12% Liquid 15 milliLiter(s) Oral Mucosa two times a day  chlorhexidine 2% Cloths 1 Application(s) Topical daily  dextrose 5%. 1000 milliLiter(s) IV Continuous <Continuous>  dextrose 5%. 1000 milliLiter(s) IV Continuous <Continuous>  dextrose 50% Injectable 25 Gram(s) IV Push once  dextrose 50% Injectable 25 Gram(s) IV Push once  dextrose 50% Injectable 12.5 Gram(s) IV Push once  fentaNYL   Infusion. 0.5 MICROgram(s)/kG/Hr IV Continuous <Continuous>  glucagon  Injectable 1 milliGRAM(s) IntraMuscular once  heparin   Injectable 5000 Unit(s) SubCutaneous every 12 hours  insulin glargine Injectable (LANTUS) 21 Unit(s) SubCutaneous every morning  insulin lispro (ADMELOG) corrective regimen sliding scale   SubCutaneous three times a day before meals  insulin lispro Injectable (ADMELOG) 7 Unit(s) SubCutaneous before breakfast  insulin lispro Injectable (ADMELOG) 7 Unit(s) SubCutaneous before lunch  insulin lispro Injectable (ADMELOG) 7 Unit(s) SubCutaneous before dinner  insulin regular Infusion 6 Unit(s)/Hr IV Continuous <Continuous>  lacosamide IVPB 100 milliGRAM(s) IV Intermittent every 12 hours  lactated ringers. 1000 milliLiter(s) IV Continuous <Continuous>  levETIRAcetam  IVPB 1000 milliGRAM(s) IV Intermittent every 12 hours  pantoprazole  Injectable 40 milliGRAM(s) IV Push daily  piperacillin/tazobactam IVPB. 3.375 Gram(s) IV Intermittent once  piperacillin/tazobactam IVPB.- 3.375 Gram(s) IV Intermittent once  piperacillin/tazobactam IVPB.- 3.375 Gram(s) IV Intermittent once  propofol Infusion 40 MICROgram(s)/kG/Min IV Continuous <Continuous>  senna 2 Tablet(s) Oral at bedtime    PRN MEDICATIONS  albuterol    90 MICROgram(s) HFA Inhaler 2 Puff(s) Inhalation every 6 hours PRN  dextrose Oral Gel 15 Gram(s) Oral once PRN  ondansetron Injectable 4 milliGRAM(s) IV Push every 6 hours PRN    VITALS:   T(F): 98.7  HR: 78  BP: 131/60  RR: 26  SpO2: 100%    PHYSICAL EXAM:  GENERAL:   (  ) NAD, lying in bed comfortably     (  ) obtunded     (  ) lethargic     (  ) somnolent    HEAD:   (  ) Atraumatic     (  ) hematoma     (  ) laceration (specify location:       )     NECK:  (  ) Supple     (  ) neck stiffness     (  ) nuchal rigidity     (  )  no JVD     (  ) JVD present ( -- cm)    HEART:  Rate -->     (  ) normal rate     (  ) bradycardic     (  ) tachycardic  Rhythm -->     (  ) regular     (  ) regularly irregular     (  ) irregularly irregular  Murmurs -->     (  ) normal s1s2     (  ) systolic murmur     (  ) diastolic murmur     (  ) continuous murmur      (  ) S3 present     (  ) S4 present    LUNGS:   (  )Unlabored respirations     (  ) tachypnea  (  ) B/L air entry     (  ) decreased breath sounds in:  (location     )    (  ) no adventitious sound     (  ) crackles     (  ) wheezing      (  ) rhonchi      (specify location:       )  (  ) chest wall tenderness (specify location:       )    ABDOMEN:   (  ) Soft     (  ) tense   |   (  ) nondistended     (  ) distended   |   (  ) +BS     (  ) hypoactive bowel sounds     (  ) hyperactive bowel sounds  (  ) nontender     (  ) RUQ tenderness     (  ) RLQ tenderness     (  ) LLQ tenderness     (  ) epigastric tenderness     (  ) diffuse tenderness  (  ) Splenomegaly      (  ) Hepatomegaly      (  ) Jaundice     (  ) ecchymosis     EXTREMITIES: 2+ peripheral pulses bilaterally. No clubbing, cyanosis, or edema  (  ) Normal     (  ) Rash     (  ) ecchymosis     (  ) varicose veins      (  ) pitting edema     (  ) non-pitting edema   (  ) ulceration     (  ) gangrene:     (location:     )    NERVOUS SYSTEM:    (  ) A&Ox3     (  ) confused     (  ) lethargic  CN II-XII:     (  ) Intact     (  ) deficits found     (Specify:     )   Upper extremities:     (  ) no sensorimotor deficits     (  ) weakness     (  ) loss of proprioception/vibration     (  ) loss of touch/temperature (specify:    )  Lower extremities:     (  ) no sensorimotor deficits     (  ) weakness     (  ) loss of proprioception/vibration     (  ) loss of touch/temperature (specify:    )    SKIN:   (  ) No rashes or lesions     (  ) maculopapular rash     (  ) pustules     (  ) vesicles     (  ) ulcer     (  ) ecchymosis     (specify location:     )    AMPAC score:    (  ) Indwelling Basilio Catheter:   Date insterted:    Reason (  ) Critical illness     (  ) urinary retention    (  ) Accurate Ins/Outs Monitoring     (  ) CMO patient    (  ) Central Line:   Date inserted:  Location: (  ) Right IJ     (  ) Left IJ     (  ) Right Fem     (  ) Left Fem    (  ) SPC        (  ) pigtail       (  ) PEG tube       (  ) colostomy       (  ) jejunostomy  (  ) U-Dall    LABS:                        11.4   9.20  )-----------( 146      ( 08 Aug 2023 05:35 )             33.7     08-08    141  |  110  |  29<H>  ----------------------------<  105<H>  4.6   |  25  |  1.1    Ca    8.2<L>      08 Aug 2023 05:35  Phos  2.1     08-08  Mg     2.2     08-08    TPro  5.1<L>  /  Alb  3.2<L>  /  TBili  0.5  /  DBili  x   /  AST  12  /  ALT  11  /  AlkPhos  76  08-08      Urinalysis Basic - ( 08 Aug 2023 05:35 )    Color: x / Appearance: x / SG: x / pH: x  Gluc: 105 mg/dL / Ketone: x  / Bili: x / Urobili: x   Blood: x / Protein: x / Nitrite: x   Leuk Esterase: x / RBC: x / WBC x   Sq Epi: x / Non Sq Epi: x / Bacteria: x      ABG - ( 08 Aug 2023 03:20 )  pH, Arterial: 7.39  pH, Blood: x     /  pCO2: 43    /  pO2: 136   / HCO3: 26    / Base Excess: 0.8   /  SaO2: 99.9              Lactate, Blood: 1.0 mmol/L (08-08-23 @ 05:35)  Lactate, Blood: 4.5 mmol/L *HH* (08-07-23 @ 11:45)  Troponin T, Serum: <0.01 ng/mL (08-07-23 @ 11:45)      Culture - Urine (collected 07 Aug 2023 06:40)  Source: Clean Catch Clean Catch (Midstream)  Final Report (08 Aug 2023 09:42):    No growth      CARDIAC MARKERS ( 07 Aug 2023 11:45 )  x     / <0.01 ng/mL / x     / x     / x          RADIOLOGY:          24H events:    Patient is a 73y old Male who presents with a chief complaint of Altered Mental Status and Hyperglycemia (08 Aug 2023 08:07)    Primary diagnosis of Acute respiratory failure with hypoxia      Day 1:  Day 2:  Day 3:     Today is hospital day 1d. This morning patient was seen and examined at bedside, resting comfortably in bed.    No acute or major events overnight.    Code Status:    Family communication:  Contact date:  Name of person contacted:  Relationship to patient:  Communication details:  What matters most:    PAST MEDICAL & SURGICAL HISTORY  DM (diabetes mellitus)  Type 2, 12/27/18 hgb aic  11.2    HTN (hypertension)    Dyslipidemia    Diabetic foot ulcer    Depression    GERD (gastroesophageal reflux disease)    Neuropathy, diabetic    Toe amputation status, right  (2008)    History of amputation of toe  LT -partial 1st toe      SOCIAL HISTORY:  Social History:      ALLERGIES:  No Known Allergies    MEDICATIONS:  STANDING MEDICATIONS  aspirin  chewable 81 milliGRAM(s) Oral daily  chlorhexidine 0.12% Liquid 15 milliLiter(s) Oral Mucosa two times a day  chlorhexidine 2% Cloths 1 Application(s) Topical daily  dextrose 5%. 1000 milliLiter(s) IV Continuous <Continuous>  dextrose 5%. 1000 milliLiter(s) IV Continuous <Continuous>  dextrose 50% Injectable 25 Gram(s) IV Push once  dextrose 50% Injectable 25 Gram(s) IV Push once  dextrose 50% Injectable 12.5 Gram(s) IV Push once  fentaNYL   Infusion. 0.5 MICROgram(s)/kG/Hr IV Continuous <Continuous>  glucagon  Injectable 1 milliGRAM(s) IntraMuscular once  heparin   Injectable 5000 Unit(s) SubCutaneous every 12 hours  insulin glargine Injectable (LANTUS) 21 Unit(s) SubCutaneous every morning  insulin lispro (ADMELOG) corrective regimen sliding scale   SubCutaneous three times a day before meals  insulin lispro Injectable (ADMELOG) 7 Unit(s) SubCutaneous before breakfast  insulin lispro Injectable (ADMELOG) 7 Unit(s) SubCutaneous before lunch  insulin lispro Injectable (ADMELOG) 7 Unit(s) SubCutaneous before dinner  insulin regular Infusion 6 Unit(s)/Hr IV Continuous <Continuous>  lacosamide IVPB 100 milliGRAM(s) IV Intermittent every 12 hours  lactated ringers. 1000 milliLiter(s) IV Continuous <Continuous>  levETIRAcetam  IVPB 1000 milliGRAM(s) IV Intermittent every 12 hours  pantoprazole  Injectable 40 milliGRAM(s) IV Push daily  piperacillin/tazobactam IVPB. 3.375 Gram(s) IV Intermittent once  piperacillin/tazobactam IVPB.- 3.375 Gram(s) IV Intermittent once  piperacillin/tazobactam IVPB.- 3.375 Gram(s) IV Intermittent once  propofol Infusion 40 MICROgram(s)/kG/Min IV Continuous <Continuous>  senna 2 Tablet(s) Oral at bedtime    PRN MEDICATIONS  albuterol    90 MICROgram(s) HFA Inhaler 2 Puff(s) Inhalation every 6 hours PRN  dextrose Oral Gel 15 Gram(s) Oral once PRN  ondansetron Injectable 4 milliGRAM(s) IV Push every 6 hours PRN    VITALS:   T(F): 98.7  HR: 78  BP: 131/60  RR: 26  SpO2: 100%    PHYSICAL EXAM:  GENERAL:   (  ) NAD, lying in bed comfortably     (  ) obtunded     (  ) lethargic     (  ) somnolent    HEAD:   (  ) Atraumatic     (  ) hematoma     (  ) laceration (specify location:       )     NECK:  (  ) Supple     (  ) neck stiffness     (  ) nuchal rigidity     (  )  no JVD     (  ) JVD present ( -- cm)    HEART:  Rate -->     (  ) normal rate     (  ) bradycardic     (  ) tachycardic  Rhythm -->     (  ) regular     (  ) regularly irregular     (  ) irregularly irregular  Murmurs -->     (  ) normal s1s2     (  ) systolic murmur     (  ) diastolic murmur     (  ) continuous murmur      (  ) S3 present     (  ) S4 present    LUNGS:   (  )Unlabored respirations     (  ) tachypnea  (  ) B/L air entry     (  ) decreased breath sounds in:  (location     )    (  ) no adventitious sound     (  ) crackles     (  ) wheezing      (  ) rhonchi      (specify location:       )  (  ) chest wall tenderness (specify location:       )    ABDOMEN:   (  ) Soft     (  ) tense   |   (  ) nondistended     (  ) distended   |   (  ) +BS     (  ) hypoactive bowel sounds     (  ) hyperactive bowel sounds  (  ) nontender     (  ) RUQ tenderness     (  ) RLQ tenderness     (  ) LLQ tenderness     (  ) epigastric tenderness     (  ) diffuse tenderness  (  ) Splenomegaly      (  ) Hepatomegaly      (  ) Jaundice     (  ) ecchymosis     EXTREMITIES: 2+ peripheral pulses bilaterally. No clubbing, cyanosis, or edema  (  ) Normal     (  ) Rash     (  ) ecchymosis     (  ) varicose veins      (  ) pitting edema     (  ) non-pitting edema   (  ) ulceration     (  ) gangrene:     (location:     )    NERVOUS SYSTEM:    (  ) A&Ox3     (  ) confused     (  ) lethargic  CN II-XII:     (  ) Intact     (  ) deficits found     (Specify:     )   Upper extremities:     (  ) no sensorimotor deficits     (  ) weakness     (  ) loss of proprioception/vibration     (  ) loss of touch/temperature (specify:    )  Lower extremities:     (  ) no sensorimotor deficits     (  ) weakness     (  ) loss of proprioception/vibration     (  ) loss of touch/temperature (specify:    )    SKIN:   (  ) No rashes or lesions     (  ) maculopapular rash     (  ) pustules     (  ) vesicles     (  ) ulcer     (  ) ecchymosis     (specify location:     )    AMPAC score:    (  ) Indwelling Basilio Catheter:   Date insterted:    Reason (  ) Critical illness     (  ) urinary retention    (  ) Accurate Ins/Outs Monitoring     (  ) CMO patient    (  ) Central Line:   Date inserted:  Location: (  ) Right IJ     (  ) Left IJ     (  ) Right Fem     (  ) Left Fem    (  ) SPC        (  ) pigtail       (  ) PEG tube       (  ) colostomy       (  ) jejunostomy  (  ) U-Dall    LABS:                        11.4   9.20  )-----------( 146      ( 08 Aug 2023 05:35 )             33.7     08-08    141  |  110  |  29<H>  ----------------------------<  105<H>  4.6   |  25  |  1.1    Ca    8.2<L>      08 Aug 2023 05:35  Phos  2.1     08-08  Mg     2.2     08-08    TPro  5.1<L>  /  Alb  3.2<L>  /  TBili  0.5  /  DBili  x   /  AST  12  /  ALT  11  /  AlkPhos  76  08-08      Urinalysis Basic - ( 08 Aug 2023 05:35 )    Color: x / Appearance: x / SG: x / pH: x  Gluc: 105 mg/dL / Ketone: x  / Bili: x / Urobili: x   Blood: x / Protein: x / Nitrite: x   Leuk Esterase: x / RBC: x / WBC x   Sq Epi: x / Non Sq Epi: x / Bacteria: x      ABG - ( 08 Aug 2023 03:20 )  pH, Arterial: 7.39  pH, Blood: x     /  pCO2: 43    /  pO2: 136   / HCO3: 26    / Base Excess: 0.8   /  SaO2: 99.9              Lactate, Blood: 1.0 mmol/L (08-08-23 @ 05:35)  Lactate, Blood: 4.5 mmol/L *HH* (08-07-23 @ 11:45)  Troponin T, Serum: <0.01 ng/mL (08-07-23 @ 11:45)      Culture - Urine (collected 07 Aug 2023 06:40)  Source: Clean Catch Clean Catch (Midstream)  Final Report (08 Aug 2023 09:42):    No growth      CARDIAC MARKERS ( 07 Aug 2023 11:45 )  x     / <0.01 ng/mL / x     / x     / x          RADIOLOGY:           24H events:    Patient is a 73y old Male who presents with a chief complaint of Altered Mental Status and Hyperglycemia (08 Aug 2023 08:07)    Primary diagnosis of Acute respiratory failure with hypoxia    Today is hospital day 1d. This morning patient was seen and examined at bedside, resting comfortably in bed.    No acute or major events overnight.    Code Status:    Family communication:  Contact date:  Name of person contacted:  Relationship to patient:  Communication details:  What matters most:    PAST MEDICAL & SURGICAL HISTORY  DM (diabetes mellitus)  Type 2, 12/27/18 hgb aic  11.2    HTN (hypertension)    Dyslipidemia    Diabetic foot ulcer    Depression    GERD (gastroesophageal reflux disease)    Neuropathy, diabetic    Toe amputation status, right  (2008)    History of amputation of toe  LT -partial 1st toe      SOCIAL HISTORY:  Social History:      ALLERGIES:  No Known Allergies    MEDICATIONS:  STANDING MEDICATIONS  aspirin  chewable 81 milliGRAM(s) Oral daily  chlorhexidine 0.12% Liquid 15 milliLiter(s) Oral Mucosa two times a day  chlorhexidine 2% Cloths 1 Application(s) Topical daily  dextrose 5%. 1000 milliLiter(s) IV Continuous <Continuous>  dextrose 5%. 1000 milliLiter(s) IV Continuous <Continuous>  dextrose 50% Injectable 25 Gram(s) IV Push once  dextrose 50% Injectable 25 Gram(s) IV Push once  dextrose 50% Injectable 12.5 Gram(s) IV Push once  fentaNYL   Infusion. 0.5 MICROgram(s)/kG/Hr IV Continuous <Continuous>  glucagon  Injectable 1 milliGRAM(s) IntraMuscular once  heparin   Injectable 5000 Unit(s) SubCutaneous every 12 hours  insulin glargine Injectable (LANTUS) 21 Unit(s) SubCutaneous every morning  insulin lispro (ADMELOG) corrective regimen sliding scale   SubCutaneous three times a day before meals  insulin lispro Injectable (ADMELOG) 7 Unit(s) SubCutaneous before breakfast  insulin lispro Injectable (ADMELOG) 7 Unit(s) SubCutaneous before lunch  insulin lispro Injectable (ADMELOG) 7 Unit(s) SubCutaneous before dinner  insulin regular Infusion 6 Unit(s)/Hr IV Continuous <Continuous>  lacosamide IVPB 100 milliGRAM(s) IV Intermittent every 12 hours  lactated ringers. 1000 milliLiter(s) IV Continuous <Continuous>  levETIRAcetam  IVPB 1000 milliGRAM(s) IV Intermittent every 12 hours  pantoprazole  Injectable 40 milliGRAM(s) IV Push daily  piperacillin/tazobactam IVPB. 3.375 Gram(s) IV Intermittent once  piperacillin/tazobactam IVPB.- 3.375 Gram(s) IV Intermittent once  piperacillin/tazobactam IVPB.- 3.375 Gram(s) IV Intermittent once  propofol Infusion 40 MICROgram(s)/kG/Min IV Continuous <Continuous>  senna 2 Tablet(s) Oral at bedtime    PRN MEDICATIONS  albuterol    90 MICROgram(s) HFA Inhaler 2 Puff(s) Inhalation every 6 hours PRN  dextrose Oral Gel 15 Gram(s) Oral once PRN  ondansetron Injectable 4 milliGRAM(s) IV Push every 6 hours PRN    VITALS:   T(F): 98.7  HR: 78  BP: 131/60  RR: 26  SpO2: 100%    PHYSICAL EXAM:  GENERAL:   ( ) NAD, lying in bed comfortably     (  ) obtunded     (  ) lethargic     ( x ) somnolent    HEAD:   ( x ) Atraumatic     (  ) hematoma     (  ) laceration (specify location:       )     NECK:  ( x ) Supple     (  ) neck stiffness     (  ) nuchal rigidity     (  )  no JVD     (  ) JVD present ( -- cm)    HEART:  Rate -->     ( x ) normal rate     (  ) bradycardic     (  ) tachycardic  Rhythm -->     ( x ) regular     (  ) regularly irregular     (  ) irregularly irregular  Murmurs -->     ( x ) normal s1s2     (  ) systolic murmur     (  ) diastolic murmur     (  ) continuous murmur      (  ) S3 present     (  ) S4 present    LUNGS:   ( x )Unlabored respirations     (  ) tachypnea  (  ) B/L air entry     (  ) decreased breath sounds in:  (location     )    (  ) no adventitious sound     (  ) crackles     (  ) wheezing      (  ) rhonchi      (specify location:       )  (  ) chest wall tenderness (specify location:       )    ABDOMEN:   ( x ) Soft     (  ) tense   |   (  ) nondistended     (  ) distended   |   (  ) +BS     (  ) hypoactive bowel sounds     (  ) hyperactive bowel sounds  (  ) nontender     (  ) RUQ tenderness     (  ) RLQ tenderness     (  ) LLQ tenderness     (  ) epigastric tenderness     (  ) diffuse tenderness  (  ) Splenomegaly      (  ) Hepatomegaly      (  ) Jaundice     (  ) ecchymosis     EXTREMITIES:  (  ) Normal     (  ) Rash     (  ) ecchymosis     (  ) varicose veins      (  ) pitting edema     (  ) non-pitting edema   (  ) ulceration     (  ) gangrene:     (location:     )    NERVOUS SYSTEM:    ( ) A&Ox3     (  ) confused     (  ) lethargic  CN II-XII:     (  ) Intact     (  ) deficits found     (Specify:     )   Upper extremities:     (  ) no sensorimotor deficits     (  ) weakness     (  ) loss of proprioception/vibration     (  ) loss of touch/temperature (specify:    )  Lower extremities:     (  ) no sensorimotor deficits     (  ) weakness     (  ) loss of proprioception/vibration     (  ) loss of touch/temperature (specify:    )    SKIN:   (  ) No rashes or lesions     (  ) maculopapular rash     (  ) pustules     (  ) vesicles     (  ) ulcer     (  ) ecchymosis     (specify location:     )    AMPAC score:    (  ) Indwelling Basilio Catheter:   Date insterted:    Reason (  ) Critical illness     (  ) urinary retention    (  ) Accurate Ins/Outs Monitoring     (  ) CMO patient    (  ) Central Line:   Date inserted:  Location: (  ) Right IJ     (  ) Left IJ     (  ) Right Fem     (  ) Left Fem    (  ) SPC        (  ) pigtail       (  ) PEG tube       (  ) colostomy       (  ) jejunostomy  (  ) U-Dall    LABS:                        11.4   9.20  )-----------( 146      ( 08 Aug 2023 05:35 )             33.7     08-08    141  |  110  |  29<H>  ----------------------------<  105<H>  4.6   |  25  |  1.1    Ca    8.2<L>      08 Aug 2023 05:35  Phos  2.1     08-08  Mg     2.2     08-08    TPro  5.1<L>  /  Alb  3.2<L>  /  TBili  0.5  /  DBili  x   /  AST  12  /  ALT  11  /  AlkPhos  76  08-08      Urinalysis Basic - ( 08 Aug 2023 05:35 )    Color: x / Appearance: x / SG: x / pH: x  Gluc: 105 mg/dL / Ketone: x  / Bili: x / Urobili: x   Blood: x / Protein: x / Nitrite: x   Leuk Esterase: x / RBC: x / WBC x   Sq Epi: x / Non Sq Epi: x / Bacteria: x      ABG - ( 08 Aug 2023 03:20 )  pH, Arterial: 7.39  pH, Blood: x     /  pCO2: 43    /  pO2: 136   / HCO3: 26    / Base Excess: 0.8   /  SaO2: 99.9              Lactate, Blood: 1.0 mmol/L (08-08-23 @ 05:35)  Lactate, Blood: 4.5 mmol/L *HH* (08-07-23 @ 11:45)  Troponin T, Serum: <0.01 ng/mL (08-07-23 @ 11:45)      Culture - Urine (collected 07 Aug 2023 06:40)  Source: Clean Catch Clean Catch (Midstream)  Final Report (08 Aug 2023 09:42):    No growth      CARDIAC MARKERS ( 07 Aug 2023 11:45 )  x     / <0.01 ng/mL / x     / x     / x          RADIOLOGY:          24H events:    Patient is a 73y old Male who presents with a chief complaint of Altered Mental Status and Hyperglycemia (08 Aug 2023 08:07)    Primary diagnosis of Acute respiratory failure with hypoxia      Day 1:  Day 2:  Day 3:     Today is hospital day 1d. This morning patient was seen and examined at bedside, resting comfortably in bed.    No acute or major events overnight.    Code Status:    Family communication:  Contact date:  Name of person contacted:  Relationship to patient:  Communication details:  What matters most:    PAST MEDICAL & SURGICAL HISTORY  DM (diabetes mellitus)  Type 2, 12/27/18 hgb aic  11.2    HTN (hypertension)    Dyslipidemia    Diabetic foot ulcer    Depression    GERD (gastroesophageal reflux disease)    Neuropathy, diabetic    Toe amputation status, right  (2008)    History of amputation of toe  LT -partial 1st toe      SOCIAL HISTORY:  Social History:      ALLERGIES:  No Known Allergies    MEDICATIONS:  STANDING MEDICATIONS  aspirin  chewable 81 milliGRAM(s) Oral daily  chlorhexidine 0.12% Liquid 15 milliLiter(s) Oral Mucosa two times a day  chlorhexidine 2% Cloths 1 Application(s) Topical daily  dextrose 5%. 1000 milliLiter(s) IV Continuous <Continuous>  dextrose 5%. 1000 milliLiter(s) IV Continuous <Continuous>  dextrose 50% Injectable 25 Gram(s) IV Push once  dextrose 50% Injectable 25 Gram(s) IV Push once  dextrose 50% Injectable 12.5 Gram(s) IV Push once  fentaNYL   Infusion. 0.5 MICROgram(s)/kG/Hr IV Continuous <Continuous>  glucagon  Injectable 1 milliGRAM(s) IntraMuscular once  heparin   Injectable 5000 Unit(s) SubCutaneous every 12 hours  insulin glargine Injectable (LANTUS) 21 Unit(s) SubCutaneous every morning  insulin lispro (ADMELOG) corrective regimen sliding scale   SubCutaneous three times a day before meals  insulin lispro Injectable (ADMELOG) 7 Unit(s) SubCutaneous before breakfast  insulin lispro Injectable (ADMELOG) 7 Unit(s) SubCutaneous before lunch  insulin lispro Injectable (ADMELOG) 7 Unit(s) SubCutaneous before dinner  insulin regular Infusion 6 Unit(s)/Hr IV Continuous <Continuous>  lacosamide IVPB 100 milliGRAM(s) IV Intermittent every 12 hours  lactated ringers. 1000 milliLiter(s) IV Continuous <Continuous>  levETIRAcetam  IVPB 1000 milliGRAM(s) IV Intermittent every 12 hours  pantoprazole  Injectable 40 milliGRAM(s) IV Push daily  piperacillin/tazobactam IVPB. 3.375 Gram(s) IV Intermittent once  piperacillin/tazobactam IVPB.- 3.375 Gram(s) IV Intermittent once  piperacillin/tazobactam IVPB.- 3.375 Gram(s) IV Intermittent once  propofol Infusion 40 MICROgram(s)/kG/Min IV Continuous <Continuous>  senna 2 Tablet(s) Oral at bedtime    PRN MEDICATIONS  albuterol    90 MICROgram(s) HFA Inhaler 2 Puff(s) Inhalation every 6 hours PRN  dextrose Oral Gel 15 Gram(s) Oral once PRN  ondansetron Injectable 4 milliGRAM(s) IV Push every 6 hours PRN    VITALS:   T(F): 98.7  HR: 78  BP: 131/60  RR: 26  SpO2: 100%    PHYSICAL EXAM:  GENERAL:   (  ) NAD, lying in bed comfortably     (  ) obtunded     (  ) lethargic     (  ) somnolent    HEAD:   (  ) Atraumatic     (  ) hematoma     (  ) laceration (specify location:       )     NECK:  (  ) Supple     (  ) neck stiffness     (  ) nuchal rigidity     (  )  no JVD     (  ) JVD present ( -- cm)    HEART:  Rate -->     (  ) normal rate     (  ) bradycardic     (  ) tachycardic  Rhythm -->     (  ) regular     (  ) regularly irregular     (  ) irregularly irregular  Murmurs -->     (  ) normal s1s2     (  ) systolic murmur     (  ) diastolic murmur     (  ) continuous murmur      (  ) S3 present     (  ) S4 present    LUNGS:   (  )Unlabored respirations     (  ) tachypnea  (  ) B/L air entry     (  ) decreased breath sounds in:  (location     )    (  ) no adventitious sound     (  ) crackles     (  ) wheezing      (  ) rhonchi      (specify location:       )  (  ) chest wall tenderness (specify location:       )    ABDOMEN:   (  ) Soft     (  ) tense   |   (  ) nondistended     (  ) distended   |   (  ) +BS     (  ) hypoactive bowel sounds     (  ) hyperactive bowel sounds  (  ) nontender     (  ) RUQ tenderness     (  ) RLQ tenderness     (  ) LLQ tenderness     (  ) epigastric tenderness     (  ) diffuse tenderness  (  ) Splenomegaly      (  ) Hepatomegaly      (  ) Jaundice     (  ) ecchymosis     EXTREMITIES: 2+ peripheral pulses bilaterally. No clubbing, cyanosis, or edema  (  ) Normal     (  ) Rash     (  ) ecchymosis     (  ) varicose veins      (  ) pitting edema     (  ) non-pitting edema   (  ) ulceration     (  ) gangrene:     (location:     )    NERVOUS SYSTEM:    (  ) A&Ox3     (  ) confused     (  ) lethargic  CN II-XII:     (  ) Intact     (  ) deficits found     (Specify:     )   Upper extremities:     (  ) no sensorimotor deficits     (  ) weakness     (  ) loss of proprioception/vibration     (  ) loss of touch/temperature (specify:    )  Lower extremities:     (  ) no sensorimotor deficits     (  ) weakness     (  ) loss of proprioception/vibration     (  ) loss of touch/temperature (specify:    )    SKIN:   (  ) No rashes or lesions     (  ) maculopapular rash     (  ) pustules     (  ) vesicles     (  ) ulcer     (  ) ecchymosis     (specify location:     )    AMPAC score:    (  ) Indwelling Basilio Catheter:   Date insterted:    Reason (  ) Critical illness     (  ) urinary retention    (  ) Accurate Ins/Outs Monitoring     (  ) CMO patient    (  ) Central Line:   Date inserted:  Location: (  ) Right IJ     (  ) Left IJ     (  ) Right Fem     (  ) Left Fem    (  ) SPC        (  ) pigtail       (  ) PEG tube       (  ) colostomy       (  ) jejunostomy  (  ) U-Dall    LABS:                        11.4   9.20  )-----------( 146      ( 08 Aug 2023 05:35 )             33.7     08-08    141  |  110  |  29<H>  ----------------------------<  105<H>  4.6   |  25  |  1.1    Ca    8.2<L>      08 Aug 2023 05:35  Phos  2.1     08-08  Mg     2.2     08-08    TPro  5.1<L>  /  Alb  3.2<L>  /  TBili  0.5  /  DBili  x   /  AST  12  /  ALT  11  /  AlkPhos  76  08-08      Urinalysis Basic - ( 08 Aug 2023 05:35 )    Color: x / Appearance: x / SG: x / pH: x  Gluc: 105 mg/dL / Ketone: x  / Bili: x / Urobili: x   Blood: x / Protein: x / Nitrite: x   Leuk Esterase: x / RBC: x / WBC x   Sq Epi: x / Non Sq Epi: x / Bacteria: x      ABG - ( 08 Aug 2023 03:20 )  pH, Arterial: 7.39  pH, Blood: x     /  pCO2: 43    /  pO2: 136   / HCO3: 26    / Base Excess: 0.8   /  SaO2: 99.9              Lactate, Blood: 1.0 mmol/L (08-08-23 @ 05:35)  Lactate, Blood: 4.5 mmol/L *HH* (08-07-23 @ 11:45)  Troponin T, Serum: <0.01 ng/mL (08-07-23 @ 11:45)      Culture - Urine (collected 07 Aug 2023 06:40)  Source: Clean Catch Clean Catch (Midstream)  Final Report (08 Aug 2023 09:42):    No growth      CARDIAC MARKERS ( 07 Aug 2023 11:45 )  x     / <0.01 ng/mL / x     / x     / x          RADIOLOGY:

## 2023-08-08 NOTE — PROGRESS NOTE ADULT - SUBJECTIVE AND OBJECTIVE BOX
Neurology Progress Note    Interval History:    Pt was seen and examined..     Medications:  albuterol    90 MICROgram(s) HFA Inhaler 2 Puff(s) Inhalation every 6 hours PRN  aspirin  chewable 81 milliGRAM(s) Oral daily  chlorhexidine 0.12% Liquid 15 milliLiter(s) Oral Mucosa two times a day  chlorhexidine 2% Cloths 1 Application(s) Topical daily  dextrose 5%. 1000 milliLiter(s) IV Continuous <Continuous>  dextrose 5%. 1000 milliLiter(s) IV Continuous <Continuous>  dextrose 50% Injectable 25 Gram(s) IV Push once  dextrose 50% Injectable 25 Gram(s) IV Push once  dextrose 50% Injectable 12.5 Gram(s) IV Push once  dextrose Oral Gel 15 Gram(s) Oral once PRN  fentaNYL   Infusion. 0.5 MICROgram(s)/kG/Hr IV Continuous <Continuous>  glucagon  Injectable 1 milliGRAM(s) IntraMuscular once  heparin   Injectable 5000 Unit(s) SubCutaneous every 12 hours  insulin glargine Injectable (LANTUS) 21 Unit(s) SubCutaneous every morning  insulin lispro (ADMELOG) corrective regimen sliding scale   SubCutaneous three times a day before meals  insulin lispro Injectable (ADMELOG) 7 Unit(s) SubCutaneous before breakfast  insulin lispro Injectable (ADMELOG) 7 Unit(s) SubCutaneous before lunch  insulin lispro Injectable (ADMELOG) 7 Unit(s) SubCutaneous before dinner  insulin regular Infusion 6 Unit(s)/Hr IV Continuous <Continuous>  lacosamide IVPB 100 milliGRAM(s) IV Intermittent every 12 hours  lactated ringers. 1000 milliLiter(s) IV Continuous <Continuous>  levETIRAcetam  IVPB 1000 milliGRAM(s) IV Intermittent every 12 hours  ondansetron Injectable 4 milliGRAM(s) IV Push every 6 hours PRN  pantoprazole  Injectable 40 milliGRAM(s) IV Push daily  piperacillin/tazobactam IVPB.- 3.375 Gram(s) IV Intermittent once  piperacillin/tazobactam IVPB.- 3.375 Gram(s) IV Intermittent once  propofol Infusion 40 MICROgram(s)/kG/Min IV Continuous <Continuous>  senna 2 Tablet(s) Oral at bedtime      Vital Signs Last 24 Hrs  T(C): 37.1 (08 Aug 2023 08:00), Max: 37.2 (07 Aug 2023 20:00)  T(F): 98.7 (08 Aug 2023 08:00), Max: 98.9 (07 Aug 2023 20:00)  HR: 68 (08 Aug 2023 13:07) (63 - 87)  BP: 100/52 (08 Aug 2023 12:00) (88/50 - 186/84)  BP(mean): 72 (08 Aug 2023 12:00) (64 - 121)  RR: 14 (08 Aug 2023 12:00) (13 - 26)  SpO2: 100% (08 Aug 2023 13:07) (99% - 100%)    Parameters below as of 08 Aug 2023 12:00  Patient On (Oxygen Delivery Method): ventilator    O2 Concentration (%): 50    Neurological Examination:   Comatose  PERRL, Corneal reflex intact. Oculocephalic reflex intact. Gag and cough reflex intact. grimace and withdraw to noxious stimulation    Labs:  CBC Full  -  ( 08 Aug 2023 05:35 )  WBC Count : 9.20 K/uL  RBC Count : 3.76 M/uL  Hemoglobin : 11.4 g/dL  Hematocrit : 33.7 %  Platelet Count - Automated : 146 K/uL  Mean Cell Volume : 89.6 fL  Mean Cell Hemoglobin : 30.3 pg  Mean Cell Hemoglobin Concentration : 33.8 g/dL      08    141  |  110  |  29<H>  ----------------------------<  105<H>  4.6   |  25  |  1.1    Ca    8.2<L>      08 Aug 2023 05:35  Phos  2.1     08-  Mg     2.2     08-08    TPro  5.1<L>  /  Alb  3.2<L>  /  TBili  0.5  /  DBili  x   /  AST  12  /  ALT  11  /  AlkPhos  76  08-08    LIVER FUNCTIONS - ( 08 Aug 2023 05:35 )  Alb: 3.2 g/dL / Pro: 5.1 g/dL / ALK PHOS: 76 U/L / ALT: 11 U/L / AST: 12 U/L / GGT: x             Urinalysis Basic - ( 08 Aug 2023 05:35 )            RADIOLOGY & ADDITIONAL TESTS:  Focal and generalized slowin. moderate generalized slowing  2. no focal slowing    Interictal activity - none    Events - none    Seizures - none

## 2023-08-09 LAB
A1C WITH ESTIMATED AVERAGE GLUCOSE RESULT: 12.9 % — HIGH (ref 4–5.6)
ALBUMIN SERPL ELPH-MCNC: 3.2 G/DL — LOW (ref 3.5–5.2)
ALP SERPL-CCNC: 84 U/L — SIGNIFICANT CHANGE UP (ref 30–115)
ALT FLD-CCNC: 10 U/L — SIGNIFICANT CHANGE UP (ref 0–41)
ANION GAP SERPL CALC-SCNC: 12 MMOL/L — SIGNIFICANT CHANGE UP (ref 7–14)
APTT BLD: 30.3 SEC — SIGNIFICANT CHANGE UP (ref 27–39.2)
AST SERPL-CCNC: 13 U/L — SIGNIFICANT CHANGE UP (ref 0–41)
BASE EXCESS BLDA CALC-SCNC: -0.6 MMOL/L — SIGNIFICANT CHANGE UP (ref -2–3)
BASE EXCESS BLDA CALC-SCNC: -0.8 MMOL/L — SIGNIFICANT CHANGE UP (ref -2–3)
BASE EXCESS BLDA CALC-SCNC: 0.3 MMOL/L — SIGNIFICANT CHANGE UP (ref -2–3)
BASOPHILS # BLD AUTO: 0.02 K/UL — SIGNIFICANT CHANGE UP (ref 0–0.2)
BASOPHILS NFR BLD AUTO: 0.2 % — SIGNIFICANT CHANGE UP (ref 0–1)
BILIRUB SERPL-MCNC: 0.9 MG/DL — SIGNIFICANT CHANGE UP (ref 0.2–1.2)
BUN SERPL-MCNC: 23 MG/DL — HIGH (ref 10–20)
CALCIUM SERPL-MCNC: 8.8 MG/DL — SIGNIFICANT CHANGE UP (ref 8.4–10.4)
CHLORIDE SERPL-SCNC: 110 MMOL/L — SIGNIFICANT CHANGE UP (ref 98–110)
CO2 SERPL-SCNC: 21 MMOL/L — SIGNIFICANT CHANGE UP (ref 17–32)
CREAT SERPL-MCNC: 1.1 MG/DL — SIGNIFICANT CHANGE UP (ref 0.7–1.5)
CULTURE RESULTS: SIGNIFICANT CHANGE UP
EGFR: 70 ML/MIN/1.73M2 — SIGNIFICANT CHANGE UP
EOSINOPHIL # BLD AUTO: 0.03 K/UL — SIGNIFICANT CHANGE UP (ref 0–0.7)
EOSINOPHIL NFR BLD AUTO: 0.3 % — SIGNIFICANT CHANGE UP (ref 0–8)
ESTIMATED AVERAGE GLUCOSE: 324 MG/DL — HIGH (ref 68–114)
FIBRINOGEN PPP-MCNC: 632 MG/DL — HIGH (ref 204.4–570.6)
GLUCOSE BLDC GLUCOMTR-MCNC: 195 MG/DL — HIGH (ref 70–99)
GLUCOSE BLDC GLUCOMTR-MCNC: 234 MG/DL — HIGH (ref 70–99)
GLUCOSE BLDC GLUCOMTR-MCNC: 238 MG/DL — HIGH (ref 70–99)
GLUCOSE BLDC GLUCOMTR-MCNC: 267 MG/DL — HIGH (ref 70–99)
GLUCOSE SERPL-MCNC: 232 MG/DL — HIGH (ref 70–99)
HCO3 BLDA-SCNC: 24 MMOL/L — SIGNIFICANT CHANGE UP (ref 21–28)
HCO3 BLDA-SCNC: 25 MMOL/L — SIGNIFICANT CHANGE UP (ref 21–28)
HCO3 BLDA-SCNC: 25 MMOL/L — SIGNIFICANT CHANGE UP (ref 21–28)
HCT VFR BLD CALC: 33.6 % — LOW (ref 42–52)
HGB BLD-MCNC: 11.2 G/DL — LOW (ref 14–18)
HOROWITZ INDEX BLDA+IHG-RTO: 40 — SIGNIFICANT CHANGE UP
IMM GRANULOCYTES NFR BLD AUTO: 0.6 % — HIGH (ref 0.1–0.3)
INR BLD: 0.98 RATIO — SIGNIFICANT CHANGE UP (ref 0.65–1.3)
LACOSAMIDE (VIMPAT) RESULT: <0.5 UG/ML — LOW (ref 1–10)
LACTATE SERPL-SCNC: 1 MMOL/L — SIGNIFICANT CHANGE UP (ref 0.7–2)
LDH SERPL L TO P-CCNC: 183 U/L — SIGNIFICANT CHANGE UP (ref 50–242)
LYMPHOCYTES # BLD AUTO: 0.69 K/UL — LOW (ref 1.2–3.4)
LYMPHOCYTES # BLD AUTO: 7.2 % — LOW (ref 20.5–51.1)
MAGNESIUM SERPL-MCNC: 2.1 MG/DL — SIGNIFICANT CHANGE UP (ref 1.8–2.4)
MCHC RBC-ENTMCNC: 30.2 PG — SIGNIFICANT CHANGE UP (ref 27–31)
MCHC RBC-ENTMCNC: 33.3 G/DL — SIGNIFICANT CHANGE UP (ref 32–37)
MCV RBC AUTO: 90.6 FL — SIGNIFICANT CHANGE UP (ref 80–94)
MONOCYTES # BLD AUTO: 0.7 K/UL — HIGH (ref 0.1–0.6)
MONOCYTES NFR BLD AUTO: 7.3 % — SIGNIFICANT CHANGE UP (ref 1.7–9.3)
NEUTROPHILS # BLD AUTO: 8.1 K/UL — HIGH (ref 1.4–6.5)
NEUTROPHILS NFR BLD AUTO: 84.4 % — HIGH (ref 42.2–75.2)
NRBC # BLD: 0 /100 WBCS — SIGNIFICANT CHANGE UP (ref 0–0)
PCO2 BLDA: 41 MMHG — SIGNIFICANT CHANGE UP (ref 35–48)
PCO2 BLDA: 42 MMHG — SIGNIFICANT CHANGE UP (ref 35–48)
PCO2 BLDA: 43 MMHG — SIGNIFICANT CHANGE UP (ref 35–48)
PH BLDA: 7.37 — SIGNIFICANT CHANGE UP (ref 7.35–7.45)
PH BLDA: 7.38 — SIGNIFICANT CHANGE UP (ref 7.35–7.45)
PH BLDA: 7.39 — SIGNIFICANT CHANGE UP (ref 7.35–7.45)
PHOSPHATE SERPL-MCNC: 2.7 MG/DL — SIGNIFICANT CHANGE UP (ref 2.1–4.9)
PLATELET # BLD AUTO: 117 K/UL — LOW (ref 130–400)
PMV BLD: 12.6 FL — HIGH (ref 7.4–10.4)
PO2 BLDA: 100 MMHG — SIGNIFICANT CHANGE UP (ref 83–108)
PO2 BLDA: 132 MMHG — HIGH (ref 83–108)
PO2 BLDA: 92 MMHG — SIGNIFICANT CHANGE UP (ref 83–108)
POTASSIUM SERPL-MCNC: 4.5 MMOL/L — SIGNIFICANT CHANGE UP (ref 3.5–5)
POTASSIUM SERPL-SCNC: 4.5 MMOL/L — SIGNIFICANT CHANGE UP (ref 3.5–5)
PROT SERPL-MCNC: 5.5 G/DL — LOW (ref 6–8)
PROTHROM AB SERPL-ACNC: 11.2 SEC — SIGNIFICANT CHANGE UP (ref 9.95–12.87)
RBC # BLD: 3.69 M/UL — LOW (ref 4.7–6.1)
RBC # BLD: 3.71 M/UL — LOW (ref 4.7–6.1)
RBC # FLD: 14.3 % — SIGNIFICANT CHANGE UP (ref 11.5–14.5)
RETICS #: 63.1 K/UL — SIGNIFICANT CHANGE UP (ref 25–125)
RETICS/RBC NFR: 1.7 % — HIGH (ref 0.5–1.5)
SAO2 % BLDA: 99 % — HIGH (ref 94–98)
SAO2 % BLDA: 99.5 % — HIGH (ref 94–98)
SAO2 % BLDA: 99.6 % — HIGH (ref 94–98)
SODIUM SERPL-SCNC: 143 MMOL/L — SIGNIFICANT CHANGE UP (ref 135–146)
SPECIMEN SOURCE: SIGNIFICANT CHANGE UP
WBC # BLD: 9.6 K/UL — SIGNIFICANT CHANGE UP (ref 4.8–10.8)
WBC # FLD AUTO: 9.6 K/UL — SIGNIFICANT CHANGE UP (ref 4.8–10.8)

## 2023-08-09 PROCEDURE — 97597 DBRDMT OPN WND 1ST 20 CM/<: CPT

## 2023-08-09 PROCEDURE — 71045 X-RAY EXAM CHEST 1 VIEW: CPT | Mod: 26,76

## 2023-08-09 PROCEDURE — 93010 ELECTROCARDIOGRAM REPORT: CPT

## 2023-08-09 PROCEDURE — 95720 EEG PHY/QHP EA INCR W/VEEG: CPT

## 2023-08-09 PROCEDURE — 70450 CT HEAD/BRAIN W/O DYE: CPT | Mod: 26

## 2023-08-09 PROCEDURE — 97598 DBRDMT OPN WND ADDL 20CM/<: CPT

## 2023-08-09 PROCEDURE — 93971 EXTREMITY STUDY: CPT | Mod: 26,RT

## 2023-08-09 PROCEDURE — 99291 CRITICAL CARE FIRST HOUR: CPT

## 2023-08-09 RX ORDER — HALOPERIDOL DECANOATE 100 MG/ML
2 INJECTION INTRAMUSCULAR ONCE
Refills: 0 | Status: DISCONTINUED | OUTPATIENT
Start: 2023-08-09 | End: 2023-08-09

## 2023-08-09 RX ORDER — HYDRALAZINE HCL 50 MG
5 TABLET ORAL ONCE
Refills: 0 | Status: COMPLETED | OUTPATIENT
Start: 2023-08-09 | End: 2023-08-09

## 2023-08-09 RX ORDER — PIPERACILLIN AND TAZOBACTAM 4; .5 G/20ML; G/20ML
3.38 INJECTION, POWDER, LYOPHILIZED, FOR SOLUTION INTRAVENOUS EVERY 8 HOURS
Refills: 0 | Status: DISCONTINUED | OUTPATIENT
Start: 2023-08-09 | End: 2023-08-14

## 2023-08-09 RX ORDER — POLYETHYLENE GLYCOL 3350 17 G/17G
17 POWDER, FOR SOLUTION ORAL
Refills: 0 | Status: DISCONTINUED | OUTPATIENT
Start: 2023-08-09 | End: 2023-08-16

## 2023-08-09 RX ORDER — FONDAPARINUX SODIUM 2.5 MG/.5ML
2.5 INJECTION, SOLUTION SUBCUTANEOUS DAILY
Refills: 0 | Status: DISCONTINUED | OUTPATIENT
Start: 2023-08-09 | End: 2023-08-11

## 2023-08-09 RX ORDER — MAGNESIUM SULFATE 500 MG/ML
2 VIAL (ML) INJECTION ONCE
Refills: 0 | Status: COMPLETED | OUTPATIENT
Start: 2023-08-09 | End: 2023-08-09

## 2023-08-09 RX ORDER — FENTANYL CITRATE 50 UG/ML
50 INJECTION INTRAVENOUS ONCE
Refills: 0 | Status: DISCONTINUED | OUTPATIENT
Start: 2023-08-09 | End: 2023-08-09

## 2023-08-09 RX ORDER — OLANZAPINE 15 MG/1
5 TABLET, FILM COATED ORAL ONCE
Refills: 0 | Status: COMPLETED | OUTPATIENT
Start: 2023-08-09 | End: 2023-08-09

## 2023-08-09 RX ADMIN — FONDAPARINUX SODIUM 2.5 MILLIGRAM(S): 2.5 INJECTION, SOLUTION SUBCUTANEOUS at 13:15

## 2023-08-09 RX ADMIN — PIPERACILLIN AND TAZOBACTAM 25 GRAM(S): 4; .5 INJECTION, POWDER, LYOPHILIZED, FOR SOLUTION INTRAVENOUS at 21:45

## 2023-08-09 RX ADMIN — PIPERACILLIN AND TAZOBACTAM 25 GRAM(S): 4; .5 INJECTION, POWDER, LYOPHILIZED, FOR SOLUTION INTRAVENOUS at 07:00

## 2023-08-09 RX ADMIN — Medication 5 MILLIGRAM(S): at 16:00

## 2023-08-09 RX ADMIN — FENTANYL CITRATE 50 MICROGRAM(S): 50 INJECTION INTRAVENOUS at 02:50

## 2023-08-09 RX ADMIN — Medication 7 UNIT(S): at 13:17

## 2023-08-09 RX ADMIN — LACOSAMIDE 100 MILLIGRAM(S): 50 TABLET ORAL at 05:34

## 2023-08-09 RX ADMIN — PIPERACILLIN AND TAZOBACTAM 25 GRAM(S): 4; .5 INJECTION, POWDER, LYOPHILIZED, FOR SOLUTION INTRAVENOUS at 13:17

## 2023-08-09 RX ADMIN — Medication 7 UNIT(S): at 06:17

## 2023-08-09 RX ADMIN — Medication 6: at 13:18

## 2023-08-09 RX ADMIN — HEPARIN SODIUM 5000 UNIT(S): 5000 INJECTION INTRAVENOUS; SUBCUTANEOUS at 05:17

## 2023-08-09 RX ADMIN — INSULIN GLARGINE 21 UNIT(S): 100 INJECTION, SOLUTION SUBCUTANEOUS at 08:56

## 2023-08-09 RX ADMIN — LEVETIRACETAM 400 MILLIGRAM(S): 250 TABLET, FILM COATED ORAL at 05:18

## 2023-08-09 RX ADMIN — CHLORHEXIDINE GLUCONATE 1 APPLICATION(S): 213 SOLUTION TOPICAL at 13:16

## 2023-08-09 RX ADMIN — CHLORHEXIDINE GLUCONATE 15 MILLILITER(S): 213 SOLUTION TOPICAL at 05:17

## 2023-08-09 RX ADMIN — Medication 2 GRAM(S): at 19:52

## 2023-08-09 RX ADMIN — OLANZAPINE 5 MILLIGRAM(S): 15 TABLET, FILM COATED ORAL at 15:09

## 2023-08-09 RX ADMIN — FENTANYL CITRATE 50 MICROGRAM(S): 50 INJECTION INTRAVENOUS at 01:20

## 2023-08-09 RX ADMIN — Medication 5 MILLIGRAM(S): at 06:27

## 2023-08-09 RX ADMIN — FENTANYL CITRATE 50 MICROGRAM(S): 50 INJECTION INTRAVENOUS at 03:00

## 2023-08-09 RX ADMIN — LEVETIRACETAM 400 MILLIGRAM(S): 250 TABLET, FILM COATED ORAL at 17:57

## 2023-08-09 RX ADMIN — PANTOPRAZOLE SODIUM 40 MILLIGRAM(S): 20 TABLET, DELAYED RELEASE ORAL at 13:15

## 2023-08-09 RX ADMIN — FENTANYL CITRATE 50 MICROGRAM(S): 50 INJECTION INTRAVENOUS at 01:05

## 2023-08-09 RX ADMIN — Medication 2: at 17:59

## 2023-08-09 RX ADMIN — Medication 4: at 06:18

## 2023-08-09 RX ADMIN — LACOSAMIDE 100 MILLIGRAM(S): 50 TABLET ORAL at 17:58

## 2023-08-09 RX ADMIN — Medication 5 MILLIGRAM(S): at 18:00

## 2023-08-09 RX ADMIN — CHLORHEXIDINE GLUCONATE 15 MILLILITER(S): 213 SOLUTION TOPICAL at 17:57

## 2023-08-09 NOTE — CONSULT NOTE ADULT - ASSESSMENT
73M PMHx DM, HTN, HLD, h/o seizure disorders, h/o Bipolar Disorder, h/o BPH, GERD and h/o OM s/p multiple toe amputations presents to the ED for altered mental status. Pt intubated in MICU being treated for pneumonia. Derm consulted for evaluation of RUE blisters/bullae and RUE swelling surrounding previous IV site.     #RUE bullae around elbow  - Pt with small blisters and desquamating lesions on RUE anterior and medical aspect  - Recent IV insertion in Rt antecubital fossa with right arm swelling compared to left  - Started on phenytoin recently but no other skin lesions noted  - Swelling and bullae localized to right elbow    Recs  - Right arm skin lesions and blisters likely due to arm swelling with stretching of the skin causing blisters and desquamation  - No signs of diffuse dermatologic reaction from any of the anti-epileptic drugs   - Obtain RUE duplex to evaluate for DVT  - Burn consult if arm does not improve to decide on skin biopsy 73M PMHx DM, HTN, HLD, h/o seizure disorders, h/o Bipolar Disorder, h/o BPH, GERD and h/o OM s/p multiple toe amputations presents to the ED for altered mental status. Pt intubated in MICU being treated for pneumonia. Derm consulted for evaluation of RUE blisters/bullae and RUE swelling surrounding previous IV site.     #RUE bullae around elbow  - Recent IV insertion in Rt antecubital fossa with right arm swelling compared to left  - Started on phenytoin recently but no other skin lesions noted  - Swelling and bullae localized to right elbow    Recs  - Right arm skin lesions and blisters likely due to edema from IV infiltration   - No signs of diffuse dermatologic reaction from any of the anti-epileptic drugs   - Obtain RUE duplex to evaluate for DVT  - Burn consult if arm does not improve to decide on skin biopsy

## 2023-08-09 NOTE — DIETITIAN INITIAL EVALUATION ADULT - NS FNS DIET ORDER
Diet, NPO with Tube Feed:   Tube Feeding Modality: Orogastric  Glucerna 1.2 [place]  Total Volume for 24 Hours (mL): 1320  Continuous  Starting Tube Feed Rate {mL per Hour}: 20  Increase Tube Feed Rate by (mL): 20     Every 4 hours  Until Goal Tube Feed Rate (mL per Hour): 55  Tube Feed Duration (in Hours): 24  Tube Feed Start Time: 07:00  No Carb Prosource (1pkg = 15gms Protein)     Qty per Day:  2 (08-08-23 @ 07:40) [Active]

## 2023-08-09 NOTE — CONSULT NOTE ADULT - ASSESSMENT
#RUE Bullae    - no burn surgical intervention  - wound culture sent, f/u results  - continue local wound care BID  - wash with soap and water, apply silvadene, xeroform, wrap with kerlix and ACE.   - burn signing off, recall prn

## 2023-08-09 NOTE — PROGRESS NOTE ADULT - ASSESSMENT
73M PMHx DM, HTN, HLD, h/o seizure disorders, h/o Bipolar Disorder, h/o BPH, GERD and h/o OM s/p multiple toe amputations presents to the ED for altered mental status. Admitted to ICU s/p intubation for airway protection iso AMS and management of HHS.    #DKA/HHS  -insulin infusion  -trend finger stick, lactate  -bmp q4-6  -switch D5-NS to LR 75cc/hr    #AMS  #seizures  -EEG  focal and generalized slowing and interictal as described above  -CTH -ve  -c/w keppra   -check keppra and vimpat levels  -drug screen -ve  -r/o infection with procal, full rvp, cultures  -spontaneous awakening trial  -per neuro switched propofol to precedex  -ABG qd  -CXR qd  -BMP qd    #PNA  -zosyn  -bcx    -duplex  -daily CBC, CMP    ----------------------------------------------------  # DVT prophylaxis: heparin  # GI prophylaxis: protonix  # Diet: NPO  # Activity: bed rest  # Code status: full code  # Disposition: acute  ----------------------------------------------------       73M PMHx DM, HTN, HLD, h/o seizure disorders, h/o Bipolar Disorder, h/o BPH, GERD and h/o OM s/p multiple toe amputations presents to the ED for altered mental status. Admitted to ICU s/p intubation for airway protection iso AMS and management of HHS.    #Upper Extremity swelling  -platelet count 117<146<177<199  -f/u duplex  -f/u DIC, HIIT panels  - hold heparin    #DKA/HHS  -insulin infusion  -trend finger stick, lactate  -bmp q4-6  -hold LR 75cc/hr    #AMS  #seizures  -EEG  focal and generalized slowing and interictal as described above  -CTH -ve 8/7/23, repeat CTH  -c/w keppra   -check keppra and vimpat levels  -drug screen -ve  -blood, urine cultures negative  -RVP negative  -procal noted 2.73 on 8/7/23  -spontaneous awakening trial, now off precedex  -ABG qd  -CXR qd  -BMP qd  -neuro recs appreciated    #PNA  -c/w zosyn  -bcx noted, WNL  -CXR improved from yesterday    -bowel regimen  -daily CBC, CMP    ----------------------------------------------------  # DVT prophylaxis: heparin  # GI prophylaxis: protonix  # Diet: NPO  # Activity: bed rest  # Code status: full code  # Disposition: acute  ----------------------------------------------------       73M PMHx DM, HTN, HLD, h/o seizure disorders, h/o Bipolar Disorder, h/o BPH, GERD and h/o OM s/p multiple toe amputations presents to the ED for altered mental status. Admitted to ICU s/p intubation for airway protection iso AMS and management of HHS.    #Upper Extremity swelling  - RUE duplex    #thrombocytopenia  -platelet count 117<146<177<199  -f/u DIC, HIIT panels  - peripheral smear  - hold heparin  - switch to fondaparinux    #DKA/HHS  -insulin infusion  -trend finger stick, lactate  -bmp qd  -hold LR 75cc/hr    #AMS  #seizures  -EEG  focal and generalized slowing and interictal as described above  -CTH -ve 8/7/23, repeat CTH  -c/w keppra   -check keppra and vimpat levels  -drug screen -ve  -blood, urine cultures negative  -RVP negative  -procal noted 2.73 on 8/7/23  -BMP qd  - extubated to 4L NC  -neuro recs appreciated    #PNA  -c/w zosyn  -bcx noted, WNL  -ABG qd  -CXR qd  -CXR improved from yesterday    -bowel regimen  -daily CBC, CMP    ----------------------------------------------------  # DVT prophylaxis: fondaparinux  # GI prophylaxis: protonix  # Diet: NPO with tube feeds  # Activity: bed rest, increase as tolerated  # Code status: full code  # Disposition: acute  ----------------------------------------------------

## 2023-08-09 NOTE — SWALLOW BEDSIDE ASSESSMENT ADULT - COMMENTS
RN reports patient is combative, confused. RN reports patient is combative, confused, s/p extubation today.

## 2023-08-09 NOTE — CONSULT NOTE ADULT - REASON FOR ADMISSION
Altered Mental Status and Hyperglycemia
left hip IT fx s/p ORIF
Altered Mental Status and Hyperglycemia

## 2023-08-09 NOTE — EEG REPORT - NS EEG TEXT BOX
Epilepsy Attending Note:     JOSÉ MANUEL PORTER    73y Male  MRN [id number]    Vital Signs Last 24 Hrs  T(C): 35.6 (09 Aug 2023 08:00), Max: 37 (08 Aug 2023 12:00)  T(F): 96.1 (09 Aug 2023 08:00), Max: 98.6 (08 Aug 2023 12:00)  HR: 66 (09 Aug 2023 10:30) (52 - 87)  BP: 121/56 (09 Aug 2023 10:30) (83/51 - 215/97)  BP(mean): 81 (09 Aug 2023 10:30) (63 - 139)  RR: 18 (09 Aug 2023 10:30) (14 - 21)  SpO2: 98% (09 Aug 2023 10:30) (98% - 100%)    Parameters below as of 09 Aug 2023 09:35  Patient On (Oxygen Delivery Method): mask, aerosol  O2 Flow (L/min): 40                            11.2   9.60  )-----------( 117      ( 09 Aug 2023 05:08 )             33.6       08-09    143  |  110  |  23<H>  ----------------------------<  232<H>  4.5   |  21  |  1.1    Ca    8.8      09 Aug 2023 05:08  Phos  2.7     08-09  Mg     2.1     -    TPro  5.5<L>  /  Alb  3.2<L>  /  TBili  0.9  /  DBili  x   /  AST  13  /  ALT  10  /  AlkPhos  84  -      MEDICATIONS  (STANDING):  aspirin  chewable 81 milliGRAM(s) Oral daily  chlorhexidine 0.12% Liquid 15 milliLiter(s) Oral Mucosa two times a day  chlorhexidine 2% Cloths 1 Application(s) Topical daily  dexMEDEtomidine Infusion 0.2 MICROgram(s)/kG/Hr (4.18 mL/Hr) IV Continuous <Continuous>  dextrose 5%. 1000 milliLiter(s) (100 mL/Hr) IV Continuous <Continuous>  dextrose 5%. 1000 milliLiter(s) (50 mL/Hr) IV Continuous <Continuous>  dextrose 50% Injectable 25 Gram(s) IV Push once  dextrose 50% Injectable 12.5 Gram(s) IV Push once  dextrose 50% Injectable 25 Gram(s) IV Push once  fentaNYL   Infusion. 0.5 MICROgram(s)/kG/Hr (4 mL/Hr) IV Continuous <Continuous>  fondaparinux Injectable 2.5 milliGRAM(s) SubCutaneous daily  glucagon  Injectable 1 milliGRAM(s) IntraMuscular once  insulin glargine Injectable (LANTUS) 21 Unit(s) SubCutaneous every morning  insulin lispro (ADMELOG) corrective regimen sliding scale   SubCutaneous three times a day before meals  insulin lispro Injectable (ADMELOG) 7 Unit(s) SubCutaneous before breakfast  insulin lispro Injectable (ADMELOG) 7 Unit(s) SubCutaneous before lunch  insulin lispro Injectable (ADMELOG) 7 Unit(s) SubCutaneous before dinner  insulin regular Infusion 6 Unit(s)/Hr (6 mL/Hr) IV Continuous <Continuous>  lacosamide IVPB 100 milliGRAM(s) IV Intermittent every 12 hours  levETIRAcetam  IVPB 1000 milliGRAM(s) IV Intermittent every 12 hours  pantoprazole  Injectable 40 milliGRAM(s) IV Push daily  polyethylene glycol 3350 17 Gram(s) Oral two times a day  senna 2 Tablet(s) Oral at bedtime    MEDICATIONS  (PRN):  albuterol    90 MICROgram(s) HFA Inhaler 2 Puff(s) Inhalation every 6 hours PRN Bronchospasm  dextrose Oral Gel 15 Gram(s) Oral once PRN Blood Glucose LESS THAN 70 milliGRAM(s)/deciliter  ondansetron Injectable 4 milliGRAM(s) IV Push every 6 hours PRN Nausea and/or Vomiting            VEEG in the last 24 hours: Intubated, sedated    Background - mainly sedated and sleep recording that is low in amplitude, shows symmetry, mildly reactive    Focal and generalized slowin. moderate generalized slowing  2. mild to moderate bifrontal focal slowing    Interictal activity:  1. small number of diffusely expressed triphasic waves  2. diffusely expressed, more prominently frontopolar, sharply contoured theta    Events - none    Seizures - none    Impression: Abnormal VEEG as above    Plan - per neurology team

## 2023-08-09 NOTE — PROGRESS NOTE ADULT - SUBJECTIVE AND OBJECTIVE BOX
24H events:    Patient is a 73y old Male who presents with a chief complaint of Altered Mental Status and Hyperglycemia (08 Aug 2023 13:22)    Primary diagnosis of Acute respiratory failure with hypoxia      Day 1:  Day 2:  Day 3:     Today is hospital day 2d. This morning patient was seen and examined at bedside, resting comfortably in bed.    No acute or major events overnight.  This morning patient was seen and examined at bedside, resting comfortably in bed.  >chief complaint< has improved.  Reports having good night sleep.   Appetite is adequate, and denies nausea or vomiting.   Denies any abdominal pain, diarrhea, or constipation.   Ambulating and denies any shortness of breath, chest pain, palpitations, or light headedness.  Voiding freely / via lubin catheter and denies urinary symptoms (dysuria, urgency, frequency, intermittence).    Code Status:    Family communication:  Contact date:  Name of person contacted:  Relationship to patient:  Communication details:  What matters most:    PAST MEDICAL & SURGICAL HISTORY  DM (diabetes mellitus)  Type 2, 12/27/18 hgb aic  11.2    HTN (hypertension)    Dyslipidemia    Diabetic foot ulcer    Depression    GERD (gastroesophageal reflux disease)    Neuropathy, diabetic    Toe amputation status, right  (2008)    History of amputation of toe  LT -partial 1st toe      SOCIAL HISTORY:  Social History:      ALLERGIES:  No Known Allergies    MEDICATIONS:  STANDING MEDICATIONS  aspirin  chewable 81 milliGRAM(s) Oral daily  chlorhexidine 0.12% Liquid 15 milliLiter(s) Oral Mucosa two times a day  chlorhexidine 2% Cloths 1 Application(s) Topical daily  dexMEDEtomidine Infusion 0.2 MICROgram(s)/kG/Hr IV Continuous <Continuous>  dextrose 5%. 1000 milliLiter(s) IV Continuous <Continuous>  dextrose 5%. 1000 milliLiter(s) IV Continuous <Continuous>  dextrose 50% Injectable 25 Gram(s) IV Push once  dextrose 50% Injectable 25 Gram(s) IV Push once  dextrose 50% Injectable 12.5 Gram(s) IV Push once  fentaNYL   Infusion. 0.5 MICROgram(s)/kG/Hr IV Continuous <Continuous>  glucagon  Injectable 1 milliGRAM(s) IntraMuscular once  heparin   Injectable 5000 Unit(s) SubCutaneous every 12 hours  insulin glargine Injectable (LANTUS) 21 Unit(s) SubCutaneous every morning  insulin lispro (ADMELOG) corrective regimen sliding scale   SubCutaneous three times a day before meals  insulin lispro Injectable (ADMELOG) 7 Unit(s) SubCutaneous before breakfast  insulin lispro Injectable (ADMELOG) 7 Unit(s) SubCutaneous before lunch  insulin lispro Injectable (ADMELOG) 7 Unit(s) SubCutaneous before dinner  insulin regular Infusion 6 Unit(s)/Hr IV Continuous <Continuous>  lacosamide IVPB 100 milliGRAM(s) IV Intermittent every 12 hours  lactated ringers. 1000 milliLiter(s) IV Continuous <Continuous>  levETIRAcetam  IVPB 1000 milliGRAM(s) IV Intermittent every 12 hours  pantoprazole  Injectable 40 milliGRAM(s) IV Push daily  senna 2 Tablet(s) Oral at bedtime    PRN MEDICATIONS  albuterol    90 MICROgram(s) HFA Inhaler 2 Puff(s) Inhalation every 6 hours PRN  dextrose Oral Gel 15 Gram(s) Oral once PRN  ondansetron Injectable 4 milliGRAM(s) IV Push every 6 hours PRN    VITALS:   T(F): 95.5  HR: 53  BP: 145/69  RR: 14  SpO2: 100%    PHYSICAL EXAM:  GENERAL:   (  ) NAD, lying in bed comfortably     (  ) obtunded     (  ) lethargic     (  ) somnolent    HEAD:   (  ) Atraumatic     (  ) hematoma     (  ) laceration (specify location:       )     NECK:  (  ) Supple     (  ) neck stiffness     (  ) nuchal rigidity     (  )  no JVD     (  ) JVD present ( -- cm)    HEART:  Rate -->     (  ) normal rate     (  ) bradycardic     (  ) tachycardic  Rhythm -->     (  ) regular     (  ) regularly irregular     (  ) irregularly irregular  Murmurs -->     (  ) normal s1s2     (  ) systolic murmur     (  ) diastolic murmur     (  ) continuous murmur      (  ) S3 present     (  ) S4 present    LUNGS:   (  )Unlabored respirations     (  ) tachypnea  (  ) B/L air entry     (  ) decreased breath sounds in:  (location     )    (  ) no adventitious sound     (  ) crackles     (  ) wheezing      (  ) rhonchi      (specify location:       )  (  ) chest wall tenderness (specify location:       )    ABDOMEN:   (  ) Soft     (  ) tense   |   (  ) nondistended     (  ) distended   |   (  ) +BS     (  ) hypoactive bowel sounds     (  ) hyperactive bowel sounds  (  ) nontender     (  ) RUQ tenderness     (  ) RLQ tenderness     (  ) LLQ tenderness     (  ) epigastric tenderness     (  ) diffuse tenderness  (  ) Splenomegaly      (  ) Hepatomegaly      (  ) Jaundice     (  ) ecchymosis     EXTREMITIES: 2+ peripheral pulses bilaterally. No clubbing, cyanosis, or edema  (  ) Normal     (  ) Rash     (  ) ecchymosis     (  ) varicose veins      (  ) pitting edema     (  ) non-pitting edema   (  ) ulceration     (  ) gangrene:     (location:     )    NERVOUS SYSTEM:    (  ) A&Ox3     (  ) confused     (  ) lethargic  CN II-XII:     (  ) Intact     (  ) deficits found     (Specify:     )   Upper extremities:     (  ) no sensorimotor deficits     (  ) weakness     (  ) loss of proprioception/vibration     (  ) loss of touch/temperature (specify:    )  Lower extremities:     (  ) no sensorimotor deficits     (  ) weakness     (  ) loss of proprioception/vibration     (  ) loss of touch/temperature (specify:    )    SKIN:   (  ) No rashes or lesions     (  ) maculopapular rash     (  ) pustules     (  ) vesicles     (  ) ulcer     (  ) ecchymosis     (specify location:     )    AMPAC score:    (  ) Indwelling Lubin Catheter:   Date insterted:    Reason (  ) Critical illness     (  ) urinary retention    (  ) Accurate Ins/Outs Monitoring     (  ) CMO patient    (  ) Central Line:   Date inserted:  Location: (  ) Right IJ     (  ) Left IJ     (  ) Right Fem     (  ) Left Fem    (  ) SPC        (  ) pigtail       (  ) PEG tube       (  ) colostomy       (  ) jejunostomy  (  ) U-Dall    LABS:                        11.2   9.60  )-----------( 117      ( 09 Aug 2023 05:08 )             33.6     08-08    141  |  110  |  29<H>  ----------------------------<  105<H>  4.6   |  25  |  1.1    Ca    8.2<L>      08 Aug 2023 05:35  Phos  2.1     08-08  Mg     2.2     08-08    TPro  5.1<L>  /  Alb  3.2<L>  /  TBili  0.5  /  DBili  x   /  AST  12  /  ALT  11  /  AlkPhos  76  08-08      Urinalysis Basic - ( 08 Aug 2023 05:35 )    Color: x / Appearance: x / SG: x / pH: x  Gluc: 105 mg/dL / Ketone: x  / Bili: x / Urobili: x   Blood: x / Protein: x / Nitrite: x   Leuk Esterase: x / RBC: x / WBC x   Sq Epi: x / Non Sq Epi: x / Bacteria: x      ABG - ( 09 Aug 2023 03:08 )  pH, Arterial: 7.38  pH, Blood: x     /  pCO2: 41    /  pO2: 100   / HCO3: 24    / Base Excess: -0.8  /  SaO2: 99.5              Lactate, Blood: 1.0 mmol/L (08-09-23 @ 05:08)      Culture - Urine (collected 07 Aug 2023 06:40)  Source: Clean Catch Clean Catch (Midstream)  Final Report (08 Aug 2023 09:42):    No growth    Culture - Blood (collected 07 Aug 2023 03:15)  Source: .Blood Blood-Peripheral  Preliminary Report (08 Aug 2023 14:01):    No growth at 24 hours    Culture - Blood (collected 07 Aug 2023 03:15)  Source: .Blood Blood-Peripheral  Preliminary Report (08 Aug 2023 14:01):    No growth at 24 hours      CARDIAC MARKERS ( 07 Aug 2023 11:45 )  x     / <0.01 ng/mL / x     / x     / x        RADIOLOGY:    ECHO TTE 8/8/23  1. Left ventricular ejection fraction, by visual estimation, is 50 to 55%.    CXR 8/8/23  Improving left perihilar infiltrate.    CXR 8/7/23  Left perihilar infiltrate.  Nasogastric tube in satisfactory position.    CT BRAIN 8/7/23  1.  No acute intracranial pathology.  2.  Mild chronic microvascular ischemic changes.    CXR 8/7/23  Endotracheal tube tip located above the dinesh. 24H events:    Patient is a 73y old Male who presents with a chief complaint of Altered Mental Status and Hyperglycemia (08 Aug 2023 13:22)    Primary diagnosis of Acute respiratory failure with hypoxia      Day 1:  Day 2:  Day 3:     Today is hospital day 2d. This morning patient was seen and examined at bedside, resting comfortably in bed.    No acute or major events overnight.  This morning patient was seen and examined at bedside, resting comfortably in bed.  >chief complaint< has improved.  Reports having good night sleep.   Appetite is adequate, and denies nausea or vomiting.   Denies any abdominal pain, diarrhea, or constipation.   Ambulating and denies any shortness of breath, chest pain, palpitations, or light headedness.  Voiding freely / via lubin catheter and denies urinary symptoms (dysuria, urgency, frequency, intermittence).    Code Status:    Family communication:  Contact date:  Name of person contacted:  Relationship to patient:  Communication details:  What matters most:    PAST MEDICAL & SURGICAL HISTORY  DM (diabetes mellitus)  Type 2, 18 hgb aic  11.2    HTN (hypertension)    Dyslipidemia    Diabetic foot ulcer    Depression    GERD (gastroesophageal reflux disease)    Neuropathy, diabetic    Toe amputation status, right  ()    History of amputation of toe  LT -partial 1st toe      SOCIAL HISTORY:  Social History:      ALLERGIES:  No Known Allergies    MEDICATIONS:  STANDING MEDICATIONS  aspirin  chewable 81 milliGRAM(s) Oral daily  chlorhexidine 0.12% Liquid 15 milliLiter(s) Oral Mucosa two times a day  chlorhexidine 2% Cloths 1 Application(s) Topical daily  dexMEDEtomidine Infusion 0.2 MICROgram(s)/kG/Hr IV Continuous <Continuous>  dextrose 5%. 1000 milliLiter(s) IV Continuous <Continuous>  dextrose 5%. 1000 milliLiter(s) IV Continuous <Continuous>  dextrose 50% Injectable 25 Gram(s) IV Push once  dextrose 50% Injectable 25 Gram(s) IV Push once  dextrose 50% Injectable 12.5 Gram(s) IV Push once  fentaNYL   Infusion. 0.5 MICROgram(s)/kG/Hr IV Continuous <Continuous>  glucagon  Injectable 1 milliGRAM(s) IntraMuscular once  heparin   Injectable 5000 Unit(s) SubCutaneous every 12 hours  insulin glargine Injectable (LANTUS) 21 Unit(s) SubCutaneous every morning  insulin lispro (ADMELOG) corrective regimen sliding scale   SubCutaneous three times a day before meals  insulin lispro Injectable (ADMELOG) 7 Unit(s) SubCutaneous before breakfast  insulin lispro Injectable (ADMELOG) 7 Unit(s) SubCutaneous before lunch  insulin lispro Injectable (ADMELOG) 7 Unit(s) SubCutaneous before dinner  insulin regular Infusion 6 Unit(s)/Hr IV Continuous <Continuous>  lacosamide IVPB 100 milliGRAM(s) IV Intermittent every 12 hours  lactated ringers. 1000 milliLiter(s) IV Continuous <Continuous>  levETIRAcetam  IVPB 1000 milliGRAM(s) IV Intermittent every 12 hours  pantoprazole  Injectable 40 milliGRAM(s) IV Push daily  senna 2 Tablet(s) Oral at bedtime    PRN MEDICATIONS  albuterol    90 MICROgram(s) HFA Inhaler 2 Puff(s) Inhalation every 6 hours PRN  dextrose Oral Gel 15 Gram(s) Oral once PRN  ondansetron Injectable 4 milliGRAM(s) IV Push every 6 hours PRN    VITALS:   T(F): 95.5  HR: 53  BP: 145/69  RR: 14  SpO2: 100%    PHYSICAL EXAM:  GENERAL:   (  ) NAD, lying in bed comfortably     (  ) obtunded     (  ) lethargic     (  ) somnolent    HEAD:   (  ) Atraumatic     (  ) hematoma     (  ) laceration (specify location:       )     NECK:  (  ) Supple     (  ) neck stiffness     (  ) nuchal rigidity     (  )  no JVD     (  ) JVD present ( -- cm)    HEART:  Rate -->     (  ) normal rate     (  ) bradycardic     (  ) tachycardic  Rhythm -->     (  ) regular     (  ) regularly irregular     (  ) irregularly irregular  Murmurs -->     (  ) normal s1s2     (  ) systolic murmur     (  ) diastolic murmur     (  ) continuous murmur      (  ) S3 present     (  ) S4 present    LUNGS:   (  )Unlabored respirations     (  ) tachypnea  (  ) B/L air entry     (  ) decreased breath sounds in:  (location     )    (  ) no adventitious sound     (  ) crackles     (  ) wheezing      (  ) rhonchi      (specify location:       )  (  ) chest wall tenderness (specify location:       )    ABDOMEN:   (  ) Soft     (  ) tense   |   (  ) nondistended     (  ) distended   |   (  ) +BS     (  ) hypoactive bowel sounds     (  ) hyperactive bowel sounds  (  ) nontender     (  ) RUQ tenderness     (  ) RLQ tenderness     (  ) LLQ tenderness     (  ) epigastric tenderness     (  ) diffuse tenderness  (  ) Splenomegaly      (  ) Hepatomegaly      (  ) Jaundice     (  ) ecchymosis     EXTREMITIES: 2+ peripheral pulses bilaterally. No clubbing, cyanosis, or edema  (  ) Normal     (  ) Rash     (  ) ecchymosis     (  ) varicose veins      (  ) pitting edema     (  ) non-pitting edema   (  ) ulceration     (  ) gangrene:     (location:     )    NERVOUS SYSTEM:    (  ) A&Ox3     (  ) confused     (  ) lethargic  CN II-XII:     (  ) Intact     (  ) deficits found     (Specify:     )   Upper extremities:     (  ) no sensorimotor deficits     (  ) weakness     (  ) loss of proprioception/vibration     (  ) loss of touch/temperature (specify:    )  Lower extremities:     (  ) no sensorimotor deficits     (  ) weakness     (  ) loss of proprioception/vibration     (  ) loss of touch/temperature (specify:    )    SKIN:   (  ) No rashes or lesions     (  ) maculopapular rash     (  ) pustules     (  ) vesicles     (  ) ulcer     (  ) ecchymosis     (specify location:     )    AMPAC score:    (  ) Indwelling Lubin Catheter:   Date insterted:    Reason (  ) Critical illness     (  ) urinary retention    (  ) Accurate Ins/Outs Monitoring     (  ) CMO patient    (  ) Central Line:   Date inserted:  Location: (  ) Right IJ     (  ) Left IJ     (  ) Right Fem     (  ) Left Fem    (  ) SPC        (  ) pigtail       (  ) PEG tube       (  ) colostomy       (  ) jejunostomy  (  ) U-Dall    LABS:                        11.2   9.60  )-----------( 117      ( 09 Aug 2023 05:08 )             33.6     08-08    141  |  110  |  29<H>  ----------------------------<  105<H>  4.6   |  25  |  1.1    Ca    8.2<L>      08 Aug 2023 05:35  Phos  2.1     08-08  Mg     2.2     08-08    TPro  5.1<L>  /  Alb  3.2<L>  /  TBili  0.5  /  DBili  x   /  AST  12  /  ALT  11  /  AlkPhos  76  08-08      Urinalysis Basic - ( 08 Aug 2023 05:35 )    Color: x / Appearance: x / SG: x / pH: x  Gluc: 105 mg/dL / Ketone: x  / Bili: x / Urobili: x   Blood: x / Protein: x / Nitrite: x   Leuk Esterase: x / RBC: x / WBC x   Sq Epi: x / Non Sq Epi: x / Bacteria: x      ABG - ( 09 Aug 2023 03:08 )  pH, Arterial: 7.38  pH, Blood: x     /  pCO2: 41    /  pO2: 100   / HCO3: 24    / Base Excess: -0.8  /  SaO2: 99.5              Lactate, Blood: 1.0 mmol/L (23 @ 05:08)      Culture - Urine (collected 07 Aug 2023 06:40)  Source: Clean Catch Clean Catch (Midstream)  Final Report (08 Aug 2023 09:42):    No growth    Culture - Blood (collected 07 Aug 2023 03:15)  Source: .Blood Blood-Peripheral  Preliminary Report (08 Aug 2023 14:01):    No growth at 24 hours    Culture - Blood (collected 07 Aug 2023 03:15)  Source: .Blood Blood-Peripheral  Preliminary Report (08 Aug 2023 14:01):    No growth at 24 hours      CARDIAC MARKERS ( 07 Aug 2023 11:45 )  x     / <0.01 ng/mL / x     / x     / x        RADIOLOGY:    ECHO TTE 23  1. Left ventricular ejection fraction, by visual estimation, is 50 to 55%.    VEEG 23  Focal and generalized slowin. moderate generalized slowing  2. no focal slowing  Interictal activity - none  Events - none  Seizures - none    CXR 23  Improving left perihilar infiltrate.    CXR 23  Left perihilar infiltrate.  Nasogastric tube in satisfactory position.    CT BRAIN 23  1.  No acute intracranial pathology.  2.  Mild chronic microvascular ischemic changes.    CXR 23  Endotracheal tube tip located above the dinesh. 24H events:    Patient is a 73y old Male who presents with a chief complaint of Altered Mental Status and Hyperglycemia (08 Aug 2023 13:22)    Primary diagnosis of Acute respiratory failure with hypoxia      Day 1:  Day 2:  Day 3:     Today is hospital day 2d. This morning patient was seen and examined at bedside, resting comfortably in bed.    Overnight patient's BP elevated to 180 systolic, received 5mg Hydralazine  This morning patient was seen and examined at bedside, resting comfortably in bed.  >chief complaint< has improved.  Reports having good night sleep, is sedated.   Denies nausea or vomiting  Patient has not had any bowel movements  Ambulating and denies any shortness of breath, chest pain, palpitations, or light headedness.  Voiding freely / via lubin catheter and denies urinary symptoms (dysuria, urgency, frequency, intermittence).    Code Status:    Family communication:  Contact date:  Name of person contacted:  Relationship to patient:  Communication details:  What matters most:    PAST MEDICAL & SURGICAL HISTORY  DM (diabetes mellitus)  Type 2, 18 hgb aic  11.2    HTN (hypertension)    Dyslipidemia    Diabetic foot ulcer    Depression    GERD (gastroesophageal reflux disease)    Neuropathy, diabetic    Toe amputation status, right  ()    History of amputation of toe  LT -partial 1st toe      SOCIAL HISTORY:  Social History:      ALLERGIES:  No Known Allergies    MEDICATIONS:  STANDING MEDICATIONS  aspirin  chewable 81 milliGRAM(s) Oral daily  chlorhexidine 0.12% Liquid 15 milliLiter(s) Oral Mucosa two times a day  chlorhexidine 2% Cloths 1 Application(s) Topical daily  dexMEDEtomidine Infusion 0.2 MICROgram(s)/kG/Hr IV Continuous <Continuous>  dextrose 5%. 1000 milliLiter(s) IV Continuous <Continuous>  dextrose 5%. 1000 milliLiter(s) IV Continuous <Continuous>  dextrose 50% Injectable 25 Gram(s) IV Push once  dextrose 50% Injectable 25 Gram(s) IV Push once  dextrose 50% Injectable 12.5 Gram(s) IV Push once  fentaNYL   Infusion. 0.5 MICROgram(s)/kG/Hr IV Continuous <Continuous>  glucagon  Injectable 1 milliGRAM(s) IntraMuscular once  heparin   Injectable 5000 Unit(s) SubCutaneous every 12 hours  insulin glargine Injectable (LANTUS) 21 Unit(s) SubCutaneous every morning  insulin lispro (ADMELOG) corrective regimen sliding scale   SubCutaneous three times a day before meals  insulin lispro Injectable (ADMELOG) 7 Unit(s) SubCutaneous before breakfast  insulin lispro Injectable (ADMELOG) 7 Unit(s) SubCutaneous before lunch  insulin lispro Injectable (ADMELOG) 7 Unit(s) SubCutaneous before dinner  insulin regular Infusion 6 Unit(s)/Hr IV Continuous <Continuous>  lacosamide IVPB 100 milliGRAM(s) IV Intermittent every 12 hours  lactated ringers. 1000 milliLiter(s) IV Continuous <Continuous>  levETIRAcetam  IVPB 1000 milliGRAM(s) IV Intermittent every 12 hours  pantoprazole  Injectable 40 milliGRAM(s) IV Push daily  senna 2 Tablet(s) Oral at bedtime    PRN MEDICATIONS  albuterol    90 MICROgram(s) HFA Inhaler 2 Puff(s) Inhalation every 6 hours PRN  dextrose Oral Gel 15 Gram(s) Oral once PRN  ondansetron Injectable 4 milliGRAM(s) IV Push every 6 hours PRN    VITALS:   T(F): 95.5  HR: 53  BP: 145/69  RR: 14  SpO2: 100%    PHYSICAL EXAM:  GENERAL:   (  ) NAD, lying in bed comfortably     (  ) obtunded     (  ) lethargic     (  ) somnolent    HEAD:   (  ) Atraumatic     (  ) hematoma     (  ) laceration (specify location:       )     NECK:  (  ) Supple     (  ) neck stiffness     (  ) nuchal rigidity     (  )  no JVD     (  ) JVD present ( -- cm)    HEART:  Rate -->     (  ) normal rate     (  ) bradycardic     (  ) tachycardic  Rhythm -->     (  ) regular     (  ) regularly irregular     (  ) irregularly irregular  Murmurs -->     (  ) normal s1s2     (  ) systolic murmur     (  ) diastolic murmur     (  ) continuous murmur      (  ) S3 present     (  ) S4 present    LUNGS:   (  )Unlabored respirations     (  ) tachypnea  (  ) B/L air entry     (  ) decreased breath sounds in:  (location     )    (  ) no adventitious sound     (  ) crackles     (  ) wheezing      (  ) rhonchi      (specify location:       )  (  ) chest wall tenderness (specify location:       )    ABDOMEN:   (  ) Soft     (  ) tense   |   (  ) nondistended     (  ) distended   |   (  ) +BS     (  ) hypoactive bowel sounds     (  ) hyperactive bowel sounds  (  ) nontender     (  ) RUQ tenderness     (  ) RLQ tenderness     (  ) LLQ tenderness     (  ) epigastric tenderness     (  ) diffuse tenderness  (  ) Splenomegaly      (  ) Hepatomegaly      (  ) Jaundice     (  ) ecchymosis     EXTREMITIES: 2+ peripheral pulses bilaterally. No clubbing, cyanosis, or edema  (  ) Normal     (  ) Rash     (  ) ecchymosis     (  ) varicose veins      (  ) pitting edema     (  ) non-pitting edema   (  ) ulceration     (  ) gangrene:     (location:     )    NERVOUS SYSTEM:    (  ) A&Ox3     (  ) confused     (  ) lethargic  CN II-XII:     (  ) Intact     (  ) deficits found     (Specify:     )   Upper extremities:     (  ) no sensorimotor deficits     (  ) weakness     (  ) loss of proprioception/vibration     (  ) loss of touch/temperature (specify:    )  Lower extremities:     (  ) no sensorimotor deficits     (  ) weakness     (  ) loss of proprioception/vibration     (  ) loss of touch/temperature (specify:    )    SKIN:   (  ) No rashes or lesions     (  ) maculopapular rash     (  ) pustules     (  ) vesicles     (  ) ulcer     (  ) ecchymosis     (specify location:     )    AMPAC score:    (  ) Indwelling Lubin Catheter:   Date insterted:    Reason (  ) Critical illness     (  ) urinary retention    (  ) Accurate Ins/Outs Monitoring     (  ) CMO patient    (  ) Central Line:   Date inserted:  Location: (  ) Right IJ     (  ) Left IJ     (  ) Right Fem     (  ) Left Fem    (  ) SPC        (  ) pigtail       (  ) PEG tube       (  ) colostomy       (  ) jejunostomy  (  ) U-Dall    LABS:                        11.2   9.60  )-----------( 117      ( 09 Aug 2023 05:08 )             33.6     08-08    141  |  110  |  29<H>  ----------------------------<  105<H>  4.6   |  25  |  1.1    Ca    8.2<L>      08 Aug 2023 05:35  Phos  2.1     08-08  Mg     2.2     08-08    TPro  5.1<L>  /  Alb  3.2<L>  /  TBili  0.5  /  DBili  x   /  AST  12  /  ALT  11  /  AlkPhos  76  08-08      Urinalysis Basic - ( 08 Aug 2023 05:35 )    Color: x / Appearance: x / SG: x / pH: x  Gluc: 105 mg/dL / Ketone: x  / Bili: x / Urobili: x   Blood: x / Protein: x / Nitrite: x   Leuk Esterase: x / RBC: x / WBC x   Sq Epi: x / Non Sq Epi: x / Bacteria: x      ABG - ( 09 Aug 2023 03:08 )  pH, Arterial: 7.38  pH, Blood: x     /  pCO2: 41    /  pO2: 100   / HCO3: 24    / Base Excess: -0.8  /  SaO2: 99.5              Lactate, Blood: 1.0 mmol/L (23 @ 05:08)      Culture - Urine (collected 07 Aug 2023 06:40)  Source: Clean Catch Clean Catch (Midstream)  Final Report (08 Aug 2023 09:42):    No growth    Culture - Blood (collected 07 Aug 2023 03:15)  Source: .Blood Blood-Peripheral  Preliminary Report (08 Aug 2023 14:01):    No growth at 24 hours    Culture - Blood (collected 07 Aug 2023 03:15)  Source: .Blood Blood-Peripheral  Preliminary Report (08 Aug 2023 14:01):    No growth at 24 hours      CARDIAC MARKERS ( 07 Aug 2023 11:45 )  x     / <0.01 ng/mL / x     / x     / x        RADIOLOGY:    ECHO TTE 23  1. Left ventricular ejection fraction, by visual estimation, is 50 to 55%.    VEEG 23  Focal and generalized slowin. moderate generalized slowing  2. no focal slowing  Interictal activity - none  Events - none  Seizures - none    CXR 23  Improving left perihilar infiltrate.    CXR 23  Left perihilar infiltrate.  Nasogastric tube in satisfactory position.    CT BRAIN 23  1.  No acute intracranial pathology.  2.  Mild chronic microvascular ischemic changes.    CXR 23  Endotracheal tube tip located above the dinesh. 24H events:    Patient is a 73y old Male who presents with a chief complaint of Altered Mental Status and Hyperglycemia (08 Aug 2023 13:22)    Primary diagnosis of Acute respiratory failure with hypoxia    Today is hospital day 2d. This morning patient was seen and examined at bedside, sedated in bed.    Overnight patient was agitated, BP elevated to 180 systolic, received 5mg Hydralazine  Voiding via lubin catheter    Code Status:    Family communication:  Contact date:  Name of person contacted:  Relationship to patient:  Communication details:  What matters most:    PAST MEDICAL & SURGICAL HISTORY  DM (diabetes mellitus)  Type 2, 18 hgb aic  11.2    HTN (hypertension)    Dyslipidemia    Diabetic foot ulcer    Depression    GERD (gastroesophageal reflux disease)    Neuropathy, diabetic    Toe amputation status, right  ()    History of amputation of toe  LT -partial 1st toe      SOCIAL HISTORY:  Social History:      ALLERGIES:  No Known Allergies    MEDICATIONS:  STANDING MEDICATIONS  aspirin  chewable 81 milliGRAM(s) Oral daily  chlorhexidine 0.12% Liquid 15 milliLiter(s) Oral Mucosa two times a day  chlorhexidine 2% Cloths 1 Application(s) Topical daily  dexMEDEtomidine Infusion 0.2 MICROgram(s)/kG/Hr IV Continuous <Continuous>  dextrose 5%. 1000 milliLiter(s) IV Continuous <Continuous>  dextrose 5%. 1000 milliLiter(s) IV Continuous <Continuous>  dextrose 50% Injectable 25 Gram(s) IV Push once  dextrose 50% Injectable 25 Gram(s) IV Push once  dextrose 50% Injectable 12.5 Gram(s) IV Push once  fentaNYL   Infusion. 0.5 MICROgram(s)/kG/Hr IV Continuous <Continuous>  glucagon  Injectable 1 milliGRAM(s) IntraMuscular once  heparin   Injectable 5000 Unit(s) SubCutaneous every 12 hours  insulin glargine Injectable (LANTUS) 21 Unit(s) SubCutaneous every morning  insulin lispro (ADMELOG) corrective regimen sliding scale   SubCutaneous three times a day before meals  insulin lispro Injectable (ADMELOG) 7 Unit(s) SubCutaneous before breakfast  insulin lispro Injectable (ADMELOG) 7 Unit(s) SubCutaneous before lunch  insulin lispro Injectable (ADMELOG) 7 Unit(s) SubCutaneous before dinner  insulin regular Infusion 6 Unit(s)/Hr IV Continuous <Continuous>  lacosamide IVPB 100 milliGRAM(s) IV Intermittent every 12 hours  lactated ringers. 1000 milliLiter(s) IV Continuous <Continuous>  levETIRAcetam  IVPB 1000 milliGRAM(s) IV Intermittent every 12 hours  pantoprazole  Injectable 40 milliGRAM(s) IV Push daily  senna 2 Tablet(s) Oral at bedtime    PRN MEDICATIONS  albuterol    90 MICROgram(s) HFA Inhaler 2 Puff(s) Inhalation every 6 hours PRN  dextrose Oral Gel 15 Gram(s) Oral once PRN  ondansetron Injectable 4 milliGRAM(s) IV Push every 6 hours PRN    VITALS:   T(F): 95.5  HR: 53  BP: 145/69  RR: 14  SpO2: 100%    PHYSICAL EXAM:  GENERAL:   ( ) NAD, lying in bed comfortably     (  ) obtunded     (  ) lethargic     ( x ) somnolent    HEAD:   ( x ) Atraumatic     (  ) hematoma     (  ) laceration (specify location:       )     NECK:  ( x ) Supple     (  ) neck stiffness     (  ) nuchal rigidity     (  )  no JVD     (  ) JVD present ( -- cm)    HEART:  Rate -->     ( x ) normal rate     (  ) bradycardic     (  ) tachycardic  Rhythm -->     ( x ) regular     (  ) regularly irregular     (  ) irregularly irregular  Murmurs -->     ( x ) normal s1s2     (  ) systolic murmur     (  ) diastolic murmur     (  ) continuous murmur      (  ) S3 present     (  ) S4 present    LUNGS:   ( x )Unlabored respirations     (  ) tachypnea  ( x ) B/L air entry     (  ) decreased breath sounds in:  (location     )    (  ) no adventitious sound     (  ) crackles     (  ) wheezing      (  ) rhonchi      (specify location:       )  (  ) chest wall tenderness (specify location:       )    ABDOMEN:   ( x ) Soft     (  ) tense   |   (  ) nondistended     (  ) distended   |   (  ) +BS     (  ) hypoactive bowel sounds     (  ) hyperactive bowel sounds  (  ) nontender     (  ) RUQ tenderness     (  ) RLQ tenderness     (  ) LLQ tenderness     (  ) epigastric tenderness     (  ) diffuse tenderness  (  ) Splenomegaly      (  ) Hepatomegaly      (  ) Jaundice     (  ) ecchymosis     EXTREMITIES:   (  ) Normal     (  ) Rash     (  ) ecchymosis     (  ) varicose veins      (  ) pitting edema     (  ) non-pitting edema   (  ) ulceration     (  ) gangrene:     (location:     )    NERVOUS SYSTEM: sedated  ( ) A&Ox3     (  ) confused     (  ) lethargic  CN II-XII:     (  ) Intact     (  ) deficits found     (Specify:     )   Upper extremities:     (  ) no sensorimotor deficits     (  ) weakness     (  ) loss of proprioception/vibration     (  ) loss of touch/temperature (specify:    )  Lower extremities:     (  ) no sensorimotor deficits     (  ) weakness     (  ) loss of proprioception/vibration     (  ) loss of touch/temperature (specify:    )    SKIN: RUE bandaged at site of blisters  (  ) No rashes or lesions     (  ) maculopapular rash     (  ) pustules     (  ) vesicles     (  ) ulcer     (  ) ecchymosis     (specify location:     )    AMPAC score:    ( x ) Indwelling Lubin Catheter:   Date insterted:    Reason (  ) Critical illness     (  ) urinary retention    (  ) Accurate Ins/Outs Monitoring     (  ) CMO patient    (  ) Central Line:   Date inserted:  Location: (  ) Right IJ     (  ) Left IJ     (  ) Right Fem     (  ) Left Fem    (  ) SPC        (  ) pigtail       (  ) PEG tube       (  ) colostomy       (  ) jejunostomy  (  ) U-Dall    LABS:                        11.2   9.60  )-----------( 117      ( 09 Aug 2023 05:08 )             33.6     08-08    141  |  110  |  29<H>  ----------------------------<  105<H>  4.6   |  25  |  1.1    Ca    8.2<L>      08 Aug 2023 05:35  Phos  2.1     08-08  Mg     2.2     -08    TPro  5.1<L>  /  Alb  3.2<L>  /  TBili  0.5  /  DBili  x   /  AST  12  /  ALT  11  /  AlkPhos  76  08-08      Urinalysis Basic - ( 08 Aug 2023 05:35 )    Color: x / Appearance: x / SG: x / pH: x  Gluc: 105 mg/dL / Ketone: x  / Bili: x / Urobili: x   Blood: x / Protein: x / Nitrite: x   Leuk Esterase: x / RBC: x / WBC x   Sq Epi: x / Non Sq Epi: x / Bacteria: x      ABG - ( 09 Aug 2023 03:08 )  pH, Arterial: 7.38  pH, Blood: x     /  pCO2: 41    /  pO2: 100   / HCO3: 24    / Base Excess: -0.8  /  SaO2: 99.5              Lactate, Blood: 1.0 mmol/L (23 @ 05:08)      Culture - Urine (collected 07 Aug 2023 06:40)  Source: Clean Catch Clean Catch (Midstream)  Final Report (08 Aug 2023 09:42):    No growth    Culture - Blood (collected 07 Aug 2023 03:15)  Source: .Blood Blood-Peripheral  Preliminary Report (08 Aug 2023 14:01):    No growth at 24 hours    Culture - Blood (collected 07 Aug 2023 03:15)  Source: .Blood Blood-Peripheral  Preliminary Report (08 Aug 2023 14:01):    No growth at 24 hours      CARDIAC MARKERS ( 07 Aug 2023 11:45 )  x     / <0.01 ng/mL / x     / x     / x        RADIOLOGY:    ECHO TTE 23  1. Left ventricular ejection fraction, by visual estimation, is 50 to 55%.    VEEG 23  Focal and generalized slowin. moderate generalized slowing  2. no focal slowing  Interictal activity - none  Events - none  Seizures - none    CXR 23  Improving left perihilar infiltrate.    CXR 23  Left perihilar infiltrate.  Nasogastric tube in satisfactory position.    CT BRAIN 23  1.  No acute intracranial pathology.  2.  Mild chronic microvascular ischemic changes.    CXR 23  Endotracheal tube tip located above the dinesh.

## 2023-08-09 NOTE — CONSULT NOTE ADULT - CONSULT REASON
R arm bullae
Right arm blistering rash
AMS  HHS
s/p seizure x1 at home and in the ED, witnessed, on Lacosamide and Keppra at home, but according to wife patient has been hiding pills and not taking them.

## 2023-08-09 NOTE — BRIEF OPERATIVE NOTE - NSICDXBRIEFPROCEDURE_GEN_ALL_CORE_FT
PROCEDURES:  Selective debridement 09-Aug-2023 22:15:56 excisional debridement with scissors right arm Mauricio Montalvo

## 2023-08-09 NOTE — PROGRESS NOTE ADULT - ASSESSMENT
IMPRESSION:    Acute hypoxemic respiratory failure  DKA / HHS  Possible aspiration   Suspected seizure   Ho seizure disorder  HO bipolar disorder  Recently treated MSSA bacteremia secondary to toe OM  TYELR improving     PLAN:    CNS: FU VEEG.  Continue Keppra and Vimpat.  FU levels. SAT.  Repeat CTH     HEENT: Oral care, ETT care    PULMONARY:  HOB @ 45 degrees.  Vent changes as follows: Wean FiO2.  Monitor PPL and DP.      CARDIOVASCULAR: Avoid overload.  DC IVF.        GI: GI prophylaxis.  OG Feeding.   Bowel regimen     RENAL:  Follow up lytes.  Correct as needed.      INFECTIOUS DISEASE: Follow up cultures, Full RVP negative.  Continue Zosyn for now.  Nasal MRSA negative     HEMATOLOGICAL:  DVT prophylaxis.  Dimer noted.  FU LE duplex.  HIT and DIC panel.  Fondaparinux for now     ENDOCRINE:  Follow up FS.  Insulin protocol if needed     MUSCULOSKELETAL: bedrest    MICU    Prognosis overall poor

## 2023-08-09 NOTE — CONSULT NOTE ADULT - SUBJECTIVE AND OBJECTIVE BOX
Patient is a 73y old  Male who presents with a chief complaint of Altered Mental Status and Hyperglycemia (09 Aug 2023 15:30)  AM ROUNDS  Burn consulted for R arm bullae    Vital Signs Last 24 Hrs  T(C): 37.5 (09 Aug 2023 12:00), Max: 37.5 (09 Aug 2023 12:00)  T(F): 99.5 (09 Aug 2023 12:00), Max: 99.5 (09 Aug 2023 12:00)  HR: 100 (09 Aug 2023 16:00) (52 - 106)  BP: 204/93 (09 Aug 2023 16:00) (91/50 - 215/97)  BP(mean): 134 (09 Aug 2023 16:00) (65 - 139)  RR: 24 (09 Aug 2023 16:00) (14 - 25)  SpO2: 94% (09 Aug 2023 16:00) (92% - 100%)    LABS:                        11.2   9.60  )-----------( 117      ( 09 Aug 2023 05:08 )             33.6     08-09    143  |  110  |  23<H>  ----------------------------<  232<H>  4.5   |  21  |  1.1    Ca    8.8      09 Aug 2023 05:08  Phos  2.7     08-09  Mg     2.1     08-09    TPro  5.5<L>  /  Alb  3.2<L>  /  TBili  0.9  /  DBili  x   /  AST  13  /  ALT  10  /  AlkPhos  84  08-09    PT/INR - ( 09 Aug 2023 11:32 )   PT: 11.20 sec;   INR: 0.98 ratio    PTT - ( 09 Aug 2023 11:32 )  PTT:30.3 sec    MEDICATIONS  (STANDING):  aspirin  chewable 81 milliGRAM(s) Oral daily  chlorhexidine 0.12% Liquid 15 milliLiter(s) Oral Mucosa two times a day  chlorhexidine 2% Cloths 1 Application(s) Topical daily  dexMEDEtomidine Infusion 0.2 MICROgram(s)/kG/Hr (4.18 mL/Hr) IV Continuous <Continuous>  dextrose 5%. 1000 milliLiter(s) (50 mL/Hr) IV Continuous <Continuous>  dextrose 5%. 1000 milliLiter(s) (100 mL/Hr) IV Continuous <Continuous>  dextrose 50% Injectable 25 Gram(s) IV Push once  dextrose 50% Injectable 25 Gram(s) IV Push once  dextrose 50% Injectable 12.5 Gram(s) IV Push once  fentaNYL   Infusion. 0.5 MICROgram(s)/kG/Hr (4 mL/Hr) IV Continuous <Continuous>  fondaparinux Injectable 2.5 milliGRAM(s) SubCutaneous daily  glucagon  Injectable 1 milliGRAM(s) IntraMuscular once  haloperidol    Injectable 2 milliGRAM(s) IV Push once  hydrALAZINE Injectable 5 milliGRAM(s) IV Push once  insulin glargine Injectable (LANTUS) 21 Unit(s) SubCutaneous every morning  insulin lispro (ADMELOG) corrective regimen sliding scale   SubCutaneous three times a day before meals  insulin lispro Injectable (ADMELOG) 7 Unit(s) SubCutaneous before breakfast  insulin lispro Injectable (ADMELOG) 7 Unit(s) SubCutaneous before lunch  insulin lispro Injectable (ADMELOG) 7 Unit(s) SubCutaneous before dinner  insulin regular Infusion 6 Unit(s)/Hr (6 mL/Hr) IV Continuous <Continuous>  lacosamide IVPB 100 milliGRAM(s) IV Intermittent every 12 hours  levETIRAcetam  IVPB 1000 milliGRAM(s) IV Intermittent every 12 hours  pantoprazole  Injectable 40 milliGRAM(s) IV Push daily  piperacillin/tazobactam IVPB.. 3.375 Gram(s) IV Intermittent every 8 hours  polyethylene glycol 3350 17 Gram(s) Oral two times a day  senna 2 Tablet(s) Oral at bedtime    MEDICATIONS  (PRN):  albuterol    90 MICROgram(s) HFA Inhaler 2 Puff(s) Inhalation every 6 hours PRN Bronchospasm  dextrose Oral Gel 15 Gram(s) Oral once PRN Blood Glucose LESS THAN 70 milliGRAM(s)/deciliter  ondansetron Injectable 4 milliGRAM(s) IV Push every 6 hours PRN Nausea and/or Vomiting    PHYSICAL EXAM:  General: Pt lying in bed, no acute distress  Skin: RUE- scattered RUE bullae, some intact with serous fluid underlying, some deroofed with pink moist dermis at base. TBSA~ 2%, + edema.

## 2023-08-09 NOTE — DIETITIAN INITIAL EVALUATION ADULT - PERTINENT MEDS FT
MEDICATIONS  (STANDING):  aspirin  chewable 81 milliGRAM(s) Oral daily  chlorhexidine 0.12% Liquid 15 milliLiter(s) Oral Mucosa two times a day  chlorhexidine 2% Cloths 1 Application(s) Topical daily  dexMEDEtomidine Infusion 0.2 MICROgram(s)/kG/Hr (4.18 mL/Hr) IV Continuous <Continuous>  dextrose 5%. 1000 milliLiter(s) (50 mL/Hr) IV Continuous <Continuous>  dextrose 5%. 1000 milliLiter(s) (100 mL/Hr) IV Continuous <Continuous>  dextrose 50% Injectable 25 Gram(s) IV Push once  dextrose 50% Injectable 25 Gram(s) IV Push once  dextrose 50% Injectable 12.5 Gram(s) IV Push once  fentaNYL   Infusion. 0.5 MICROgram(s)/kG/Hr (4 mL/Hr) IV Continuous <Continuous>  fondaparinux Injectable 2.5 milliGRAM(s) SubCutaneous daily  glucagon  Injectable 1 milliGRAM(s) IntraMuscular once  insulin glargine Injectable (LANTUS) 21 Unit(s) SubCutaneous every morning  insulin lispro (ADMELOG) corrective regimen sliding scale   SubCutaneous three times a day before meals  insulin lispro Injectable (ADMELOG) 7 Unit(s) SubCutaneous before breakfast  insulin lispro Injectable (ADMELOG) 7 Unit(s) SubCutaneous before lunch  insulin lispro Injectable (ADMELOG) 7 Unit(s) SubCutaneous before dinner  insulin regular Infusion 6 Unit(s)/Hr (6 mL/Hr) IV Continuous <Continuous>  lacosamide IVPB 100 milliGRAM(s) IV Intermittent every 12 hours  levETIRAcetam  IVPB 1000 milliGRAM(s) IV Intermittent every 12 hours  pantoprazole  Injectable 40 milliGRAM(s) IV Push daily  polyethylene glycol 3350 17 Gram(s) Oral two times a day  senna 2 Tablet(s) Oral at bedtime    MEDICATIONS  (PRN):  albuterol    90 MICROgram(s) HFA Inhaler 2 Puff(s) Inhalation every 6 hours PRN Bronchospasm  dextrose Oral Gel 15 Gram(s) Oral once PRN Blood Glucose LESS THAN 70 milliGRAM(s)/deciliter  ondansetron Injectable 4 milliGRAM(s) IV Push every 6 hours PRN Nausea and/or Vomiting

## 2023-08-09 NOTE — DIETITIAN INITIAL EVALUATION ADULT - PERTINENT LABORATORY DATA
08-09    143  |  110  |  23<H>  ----------------------------<  232<H>  4.5   |  21  |  1.1    Ca    8.8      09 Aug 2023 05:08  Phos  2.7     08-09  Mg     2.1     08-09    TPro  5.5<L>  /  Alb  3.2<L>  /  TBili  0.9  /  DBili  x   /  AST  13  /  ALT  10  /  AlkPhos  84  08-09    CAPILLARY BLOOD GLUCOSE  POCT Blood Glucose.: 238 mg/dL (09 Aug 2023 08:41)  POCT Blood Glucose.: 234 mg/dL (09 Aug 2023 06:13)  POCT Blood Glucose.: 111 mg/dL (08 Aug 2023 22:45)  POCT Blood Glucose.: 222 mg/dL (08 Aug 2023 15:20)  POCT Blood Glucose.: 225 mg/dL (08 Aug 2023 13:26)  POCT Blood Glucose.: 238 mg/dL (08-09-23 @ 08:41)    A1C with Estimated Average Glucose Result: 13.3 % (08-08-23 @ 05:35)  A1C with Estimated Average Glucose Result: 7.3 % (05-17-23 @ 09:10)

## 2023-08-09 NOTE — DIETITIAN INITIAL EVALUATION ADULT - NSFNSGIIOFT_GEN_A_CORE
I&O's Detail    08 Aug 2023 07:01  -  09 Aug 2023 07:00  --------------------------------------------------------  IN:    Dexmedetomidine: 35.5 mL    Dexmedetomidine: 208.4 mL    Glucerna: 590 mL    IV PiggyBack: 600 mL    Lactated Ringers: 1725 mL    Propofol: 150.6 mL  Total IN: 3309.4 mL    OUT:    FentaNYL: 0 mL    Indwelling Catheter - Urethral (mL): 1325 mL    Insulin: 0 mL  Total OUT: 1325 mL    Total NET: 1984.4 mL

## 2023-08-09 NOTE — PROGRESS NOTE ADULT - SUBJECTIVE AND OBJECTIVE BOX
Neurology Progress Note    Interval History:    Patient was seen and examined, VEEG reported: Moderate generalized slowing, mild to moderate bifrontal focal slowing with small number of diffusely expressed triphasic waves. Patient extubated, awake, tries to speak under mask, following commands in general w improvement.        Medications:  albuterol    90 MICROgram(s) HFA Inhaler 2 Puff(s) Inhalation every 6 hours PRN  aspirin  chewable 81 milliGRAM(s) Oral daily  chlorhexidine 0.12% Liquid 15 milliLiter(s) Oral Mucosa two times a day  chlorhexidine 2% Cloths 1 Application(s) Topical daily  dexMEDEtomidine Infusion 0.2 MICROgram(s)/kG/Hr IV Continuous <Continuous>  dextrose 5%. 1000 milliLiter(s) IV Continuous <Continuous>  dextrose 5%. 1000 milliLiter(s) IV Continuous <Continuous>  dextrose 50% Injectable 25 Gram(s) IV Push once  dextrose 50% Injectable 12.5 Gram(s) IV Push once  dextrose 50% Injectable 25 Gram(s) IV Push once  dextrose Oral Gel 15 Gram(s) Oral once PRN  fentaNYL   Infusion. 0.5 MICROgram(s)/kG/Hr IV Continuous <Continuous>  fondaparinux Injectable 2.5 milliGRAM(s) SubCutaneous daily  glucagon  Injectable 1 milliGRAM(s) IntraMuscular once  hydrALAZINE Injectable 5 milliGRAM(s) IV Push once  insulin glargine Injectable (LANTUS) 21 Unit(s) SubCutaneous every morning  insulin lispro (ADMELOG) corrective regimen sliding scale   SubCutaneous three times a day before meals  insulin lispro Injectable (ADMELOG) 7 Unit(s) SubCutaneous before breakfast  insulin lispro Injectable (ADMELOG) 7 Unit(s) SubCutaneous before lunch  insulin lispro Injectable (ADMELOG) 7 Unit(s) SubCutaneous before dinner  insulin regular Infusion 6 Unit(s)/Hr IV Continuous <Continuous>  lacosamide IVPB 100 milliGRAM(s) IV Intermittent every 12 hours  levETIRAcetam  IVPB 1000 milliGRAM(s) IV Intermittent every 12 hours  magnesium sulfate Injectable 2 Gram(s) IV Push once  ondansetron Injectable 4 milliGRAM(s) IV Push every 6 hours PRN  pantoprazole  Injectable 40 milliGRAM(s) IV Push daily  piperacillin/tazobactam IVPB.. 3.375 Gram(s) IV Intermittent every 8 hours  polyethylene glycol 3350 17 Gram(s) Oral two times a day  senna 2 Tablet(s) Oral at bedtime  silver sulfADIAZINE 1% Cream 1 Application(s) Topical two times a day      Vital Signs Last 24 Hrs  T(C): 37.5 (09 Aug 2023 12:00), Max: 37.5 (09 Aug 2023 12:00)  T(F): 99.5 (09 Aug 2023 12:00), Max: 99.5 (09 Aug 2023 12:00)  HR: 96 (09 Aug 2023 17:00) (52 - 106)  BP: 177/78 (09 Aug 2023 17:00) (91/50 - 215/97)  BP(mean): 112 (09 Aug 2023 17:00) (65 - 139)  RR: 23 (09 Aug 2023 17:00) (14 - 25)  SpO2: 97% (09 Aug 2023 17:00) (92% - 100%)    Parameters below as of 09 Aug 2023 16:00  Patient On (Oxygen Delivery Method): mask, Venturi    O2 Concentration (%): 40    NEUROLOGICAL EXAMINATION:  GENERAL: Not in acute distress. Awake, alert, under oxygen mask, tries to speak - whispering - aphonic speech.  Can follow commands.    CRANIAL NERVES:   - Eyes:  Visual acuity: seems intact in both eyes, Pupils equal round and reactive, no RAPD. EOMI w/o nystagmus. No ptosis/weakness of eyelid closure.   - Face:  Facial sensation normal V1 - 3, no facial asymmetry.    - Ears/Nose/Throat:  Hearing grossly intact b/l to finger rub.  Palate elevates midline.  Tongue and uvula midline.   MOTOR EXAM:  (R/L) 5/5 UE; 5/5 LE.  No observable drift. Normal tone and bulk. No tenderness, twitching, tremors or involuntary movements.  SENSORY EXAM:  Intact to light touch in all extremities.  REFLEXES:   1+ b/l biceps, triceps, patella and achilles.     CEREBELLUM:  No dysmetria observed.    GAIT: deferred.   Romberg: deferred.

## 2023-08-09 NOTE — PROGRESS NOTE ADULT - SUBJECTIVE AND OBJECTIVE BOX
Patient is a 73y old  Male who presents with a chief complaint of Altered Mental Status and Hyperglycemia (09 Aug 2023 06:30)        Over Night Events:  Remains critically ill on MV.  Off pressors.  Sedated.          ROS:     All ROS are negative except HPI         PHYSICAL EXAM    ICU Vital Signs Last 24 Hrs  T(C): 35.1 (09 Aug 2023 06:00), Max: 37 (08 Aug 2023 12:00)  T(F): 95.2 (09 Aug 2023 06:00), Max: 98.6 (08 Aug 2023 12:00)  HR: 53 (09 Aug 2023 07:54) (52 - 87)  BP: 140/63 (09 Aug 2023 07:00) (83/51 - 215/97)  BP(mean): 124 (09 Aug 2023 06:00) (63 - 139)  ABP: --  ABP(mean): --  RR: 14 (09 Aug 2023 07:00) (14 - 26)  SpO2: 99% (09 Aug 2023 07:54) (99% - 100%)    O2 Parameters below as of 09 Aug 2023 04:00  Patient On (Oxygen Delivery Method): ventilator    O2 Concentration (%): 40        CONSTITUTIONAL:  In NAD    ENT:   Airway patent,   Mouth with normal mucosa.   ET     EYES:   Pupils equal,   Round and reactive to light.    CARDIAC:   Normal rate,   Regular rhythm.      RESPIRATORY:   No wheezing  Bilateral BS  Normal chest expansion  Not tachypneic,  No use of accessory muscles    GASTROINTESTINAL:  Abdomen soft,   Non-tender,   No guarding,   + BS    MUSCULOSKELETAL:   Range of motion is not limited,  No clubbing, cyanosis    NEUROLOGICAL:   Sedated     SKIN:   Skin normal color for race,   No evidence of rash.        08-08-23 @ 07:01  -  08-09-23 @ 07:00  --------------------------------------------------------  IN:    Dexmedetomidine: 35.5 mL    Dexmedetomidine: 89.4 mL    Glucerna: 350 mL    IV PiggyBack: 350 mL    Lactated Ringers: 1500 mL    Propofol: 150.6 mL  Total IN: 2475.5 mL    OUT:    FentaNYL: 0 mL    Indwelling Catheter - Urethral (mL): 875 mL    Insulin: 0 mL  Total OUT: 875 mL    Total NET: 1600.5 mL          LABS:                            11.2   9.60  )-----------( 117      ( 09 Aug 2023 05:08 )             33.6                                               08-09             11.2   9.60  )-----------( 117      ( 08-09 @ 05:08 )             33.6                11.4   9.20  )-----------( 146      ( 08-08 @ 05:35 )             33.7                13.7   5.73  )-----------( 177      ( 08-07 @ 11:45 )             41.0                12.9   6.06  )-----------( 199      ( 08-07 @ 03:15 )             37.4           143  |  110  |  23<H>  ----------------------------<  232<H>  4.5   |  21  |  1.1    Ca    8.8      09 Aug 2023 05:08  Phos  2.7     08-09  Mg     2.1     08-09    TPro  5.5<L>  /  Alb  3.2<L>  /  TBili  0.9  /  DBili  x   /  AST  13  /  ALT  10  /  AlkPhos  84  08-09                                             Urinalysis Basic - ( 09 Aug 2023 05:08 )    Color: x / Appearance: x / SG: x / pH: x  Gluc: 232 mg/dL / Ketone: x  / Bili: x / Urobili: x   Blood: x / Protein: x / Nitrite: x   Leuk Esterase: x / RBC: x / WBC x   Sq Epi: x / Non Sq Epi: x / Bacteria: x        CARDIAC MARKERS ( 07 Aug 2023 11:45 )  x     / <0.01 ng/mL / x     / x     / x                                                LIVER FUNCTIONS - ( 09 Aug 2023 05:08 )  Alb: 3.2 g/dL / Pro: 5.5 g/dL / ALK PHOS: 84 U/L / ALT: 10 U/L / AST: 13 U/L / GGT: x                                                  Culture - Urine (collected 07 Aug 2023 06:40)  Source: Clean Catch Clean Catch (Midstream)  Final Report (08 Aug 2023 09:42):    No growth    Culture - Blood (collected 07 Aug 2023 03:15)  Source: .Blood Blood-Peripheral  Preliminary Report (08 Aug 2023 14:01):    No growth at 24 hours    Culture - Blood (collected 07 Aug 2023 03:15)  Source: .Blood Blood-Peripheral  Preliminary Report (08 Aug 2023 14:01):    No growth at 24 hours                                                   Mode: AC/ CMV (Assist Control/ Continuous Mandatory Ventilation)  RR (machine): 14  TV (machine): 450  FiO2: 40  PEEP: 8  ITime: 1  MAP: 11  PIP: 24                                      ABG - ( 09 Aug 2023 03:08 )   pH, Arterial: 7.38  pH, Blood: x     /  pCO2: 41    /  pO2: 100   / HCO3: 24    / Base Excess: -0.8  /  SaO2: 99.5    o.8              MEDICATIONS  (STANDING):  aspirin  chewable 81 milliGRAM(s) Oral daily  chlorhexidine 0.12% Liquid 15 milliLiter(s) Oral Mucosa two times a day  chlorhexidine 2% Cloths 1 Application(s) Topical daily  dexMEDEtomidine Infusion 0.2 MICROgram(s)/kG/Hr (4.18 mL/Hr) IV Continuous <Continuous>  dextrose 5%. 1000 milliLiter(s) (50 mL/Hr) IV Continuous <Continuous>  dextrose 5%. 1000 milliLiter(s) (100 mL/Hr) IV Continuous <Continuous>  dextrose 50% Injectable 25 Gram(s) IV Push once  dextrose 50% Injectable 25 Gram(s) IV Push once  dextrose 50% Injectable 12.5 Gram(s) IV Push once  fentaNYL   Infusion. 0.5 MICROgram(s)/kG/Hr (4 mL/Hr) IV Continuous <Continuous>  glucagon  Injectable 1 milliGRAM(s) IntraMuscular once  heparin   Injectable 5000 Unit(s) SubCutaneous every 12 hours  insulin glargine Injectable (LANTUS) 21 Unit(s) SubCutaneous every morning  insulin lispro (ADMELOG) corrective regimen sliding scale   SubCutaneous three times a day before meals  insulin lispro Injectable (ADMELOG) 7 Unit(s) SubCutaneous before breakfast  insulin lispro Injectable (ADMELOG) 7 Unit(s) SubCutaneous before lunch  insulin lispro Injectable (ADMELOG) 7 Unit(s) SubCutaneous before dinner  insulin regular Infusion 6 Unit(s)/Hr (6 mL/Hr) IV Continuous <Continuous>  lacosamide IVPB 100 milliGRAM(s) IV Intermittent every 12 hours  lactated ringers. 1000 milliLiter(s) (75 mL/Hr) IV Continuous <Continuous>  levETIRAcetam  IVPB 1000 milliGRAM(s) IV Intermittent every 12 hours  pantoprazole  Injectable 40 milliGRAM(s) IV Push daily  senna 2 Tablet(s) Oral at bedtime    MEDICATIONS  (PRN):  albuterol    90 MICROgram(s) HFA Inhaler 2 Puff(s) Inhalation every 6 hours PRN Bronchospasm  dextrose Oral Gel 15 Gram(s) Oral once PRN Blood Glucose LESS THAN 70 milliGRAM(s)/deciliter  ondansetron Injectable 4 milliGRAM(s) IV Push every 6 hours PRN Nausea and/or Vomiting

## 2023-08-09 NOTE — DIETITIAN INITIAL EVALUATION ADULT - OTHER INFO
Pt presented with altered mental status. Admitted to ICU s/p intubation for airway protection iso AMS and management of HHS.

## 2023-08-09 NOTE — PROGRESS NOTE ADULT - ASSESSMENT
Neurology Progress Note    Interval History:    Patient was seen and examined, VEEG reported: Moderate generalized slowing, mild to moderate bifrontal focal slowing with small number of diffusely expressed triphasic waves. Patient extubated, awake, tries to speak under mask, following commands in general w improvement.        Medications:  albuterol    90 MICROgram(s) HFA Inhaler 2 Puff(s) Inhalation every 6 hours PRN  aspirin  chewable 81 milliGRAM(s) Oral daily  chlorhexidine 0.12% Liquid 15 milliLiter(s) Oral Mucosa two times a day  chlorhexidine 2% Cloths 1 Application(s) Topical daily  dexMEDEtomidine Infusion 0.2 MICROgram(s)/kG/Hr IV Continuous <Continuous>  dextrose 5%. 1000 milliLiter(s) IV Continuous <Continuous>  dextrose 5%. 1000 milliLiter(s) IV Continuous <Continuous>  dextrose 50% Injectable 25 Gram(s) IV Push once  dextrose 50% Injectable 12.5 Gram(s) IV Push once  dextrose 50% Injectable 25 Gram(s) IV Push once  dextrose Oral Gel 15 Gram(s) Oral once PRN  fentaNYL   Infusion. 0.5 MICROgram(s)/kG/Hr IV Continuous <Continuous>  fondaparinux Injectable 2.5 milliGRAM(s) SubCutaneous daily  glucagon  Injectable 1 milliGRAM(s) IntraMuscular once  hydrALAZINE Injectable 5 milliGRAM(s) IV Push once  insulin glargine Injectable (LANTUS) 21 Unit(s) SubCutaneous every morning  insulin lispro (ADMELOG) corrective regimen sliding scale   SubCutaneous three times a day before meals  insulin lispro Injectable (ADMELOG) 7 Unit(s) SubCutaneous before breakfast  insulin lispro Injectable (ADMELOG) 7 Unit(s) SubCutaneous before lunch  insulin lispro Injectable (ADMELOG) 7 Unit(s) SubCutaneous before dinner  insulin regular Infusion 6 Unit(s)/Hr IV Continuous <Continuous>  lacosamide IVPB 100 milliGRAM(s) IV Intermittent every 12 hours  levETIRAcetam  IVPB 1000 milliGRAM(s) IV Intermittent every 12 hours  magnesium sulfate Injectable 2 Gram(s) IV Push once  ondansetron Injectable 4 milliGRAM(s) IV Push every 6 hours PRN  pantoprazole  Injectable 40 milliGRAM(s) IV Push daily  piperacillin/tazobactam IVPB.. 3.375 Gram(s) IV Intermittent every 8 hours  polyethylene glycol 3350 17 Gram(s) Oral two times a day  senna 2 Tablet(s) Oral at bedtime  silver sulfADIAZINE 1% Cream 1 Application(s) Topical two times a day      Vital Signs Last 24 Hrs  T(C): 37.5 (09 Aug 2023 12:00), Max: 37.5 (09 Aug 2023 12:00)  T(F): 99.5 (09 Aug 2023 12:00), Max: 99.5 (09 Aug 2023 12:00)  HR: 96 (09 Aug 2023 17:00) (52 - 106)  BP: 177/78 (09 Aug 2023 17:00) (91/50 - 215/97)  BP(mean): 112 (09 Aug 2023 17:00) (65 - 139)  RR: 23 (09 Aug 2023 17:00) (14 - 25)  SpO2: 97% (09 Aug 2023 17:00) (92% - 100%)    Parameters below as of 09 Aug 2023 16:00  Patient On (Oxygen Delivery Method): mask, Venturi    O2 Concentration (%): 40    NEUROLOGICAL EXAMINATION:  GENERAL: Not in acute distress. Awake, alert, under oxygen mask, tries to speak - whispering - aphonic speech.  Can follow commands.    CRANIAL NERVES:   - Eyes:  Visual acuity: seems intact in both eyes, Pupils equal round and reactive, no RAPD. EOMI w/o nystagmus. No ptosis/weakness of eyelid closure.   - Face:  Facial sensation normal V1 - 3, no facial asymmetry.    - Ears/Nose/Throat:  Hearing grossly intact b/l to finger rub.  Palate elevates midline.  Tongue and uvula midline.   MOTOR EXAM:  (R/L) 5/5 UE; 5/5 LE.  No observable drift. Normal tone and bulk. No tenderness, twitching, tremors or involuntary movements.  SENSORY EXAM:  Intact to light touch in all extremities.  REFLEXES:   1+ b/l biceps, triceps, patella and achilles.     CEREBELLUM:  No dysmetria observed.    GAIT: deferred.   Romberg: deferred.       73M PMHx DM, HTN, HLD, h/o seizure disorders, h/o Bipolar Disorder, h/o BPH, GERD and h/o OM s/p multiple toe amputations admitted for hyperosmolar state with witnessed seizure-like activity at home that lasted for several minutes. Was intubated and sedated and extubated recently, and neurological exam was grossly non-focal. VEEG reported no ictal pattern reported, can d/c. His seizures most likely was related to HOS to hyperglycemia vs ASM non-compliance, polysubstance use.     Recommendations:     - c/w Keppra 1000 mg bid  - c/w Vimpat 100 mg bid    - c/w primary team treatment  - f/u w neurologist in OP settings after d/c         Discussed w attending Dr. Ontiveros

## 2023-08-09 NOTE — CONSULT NOTE ADULT - SUBJECTIVE AND OBJECTIVE BOX
HPI:  73M PMHx DM, HTN, HLD, h/o seizure disorders, h/o Bipolar Disorder, h/o BPH, GERD and h/o OM s/p multiple toe amputations presents to the ED for altered mental status. As per wife, patient had a witnessed seizure-like activity at home that lasted for several minutes. EMS was called and found patient to be severely hyperglycemic. Patient was arousable while en route to the hospital. Wife reports that patient has not been acting himself for the last 2 weeks. States that he has a drug problem and takes medications for his chronic pain. He has also been hiding his medications and she is unsure if he has been taking any of them. As per patient's wife, his medications should be the same since last admission except for one possible change, but she is unsure of which one.    Vitals: Temp 98.7F, /74, HR 95, RR 20, SpO2 99% on RA    Labs: Hgb 12.9 (previously 12.5), Na 130 (142 corrected for hyperglycemia), Glucose 859, Beta-Hydroxy 1.0, Serum Osm 343, Cr 1.6 (previously 1.1)    Imaging: CT Head non con: negative for acute intracranial pathology    In the ED:  - s/p 1L LR bolus  - started on Insulin drip  - s/p intubation for airway protection iso AMS and 1 episode of witness seizure in the ED  - s/p Narcan nebulizer x1  - OG tube placed - 1700cc output stomach content/bile    Admitted to MICU s/p intubation for airway protection and management of HHS. (07 Aug 2023 05:05)      Pt admitted for AMS, s/p intubation with suspected seizure activity, being treated for PNA.     PAST MEDICAL & SURGICAL HISTORY:  DM (diabetes mellitus)  Type 2, 12/27/18 hgb aic  11.2      HTN (hypertension)      Dyslipidemia      Diabetic foot ulcer      Depression      GERD (gastroesophageal reflux disease)      Neuropathy, diabetic      Toe amputation status, right  (2008)      History of amputation of toe  LT -partial 1st toe            MEDICATIONS  (STANDING):  aspirin  chewable 81 milliGRAM(s) Oral daily  chlorhexidine 0.12% Liquid 15 milliLiter(s) Oral Mucosa two times a day  chlorhexidine 2% Cloths 1 Application(s) Topical daily  dexMEDEtomidine Infusion 0.2 MICROgram(s)/kG/Hr (4.18 mL/Hr) IV Continuous <Continuous>  dextrose 5%. 1000 milliLiter(s) (50 mL/Hr) IV Continuous <Continuous>  dextrose 5%. 1000 milliLiter(s) (100 mL/Hr) IV Continuous <Continuous>  dextrose 50% Injectable 25 Gram(s) IV Push once  dextrose 50% Injectable 25 Gram(s) IV Push once  dextrose 50% Injectable 12.5 Gram(s) IV Push once  fentaNYL   Infusion. 0.5 MICROgram(s)/kG/Hr (4 mL/Hr) IV Continuous <Continuous>  fondaparinux Injectable 2.5 milliGRAM(s) SubCutaneous daily  glucagon  Injectable 1 milliGRAM(s) IntraMuscular once  insulin glargine Injectable (LANTUS) 21 Unit(s) SubCutaneous every morning  insulin lispro (ADMELOG) corrective regimen sliding scale   SubCutaneous three times a day before meals  insulin lispro Injectable (ADMELOG) 7 Unit(s) SubCutaneous before breakfast  insulin lispro Injectable (ADMELOG) 7 Unit(s) SubCutaneous before lunch  insulin lispro Injectable (ADMELOG) 7 Unit(s) SubCutaneous before dinner  insulin regular Infusion 6 Unit(s)/Hr (6 mL/Hr) IV Continuous <Continuous>  lacosamide IVPB 100 milliGRAM(s) IV Intermittent every 12 hours  levETIRAcetam  IVPB 1000 milliGRAM(s) IV Intermittent every 12 hours  pantoprazole  Injectable 40 milliGRAM(s) IV Push daily  piperacillin/tazobactam IVPB.. 3.375 Gram(s) IV Intermittent every 8 hours  polyethylene glycol 3350 17 Gram(s) Oral two times a day  senna 2 Tablet(s) Oral at bedtime    MEDICATIONS  (PRN):  albuterol    90 MICROgram(s) HFA Inhaler 2 Puff(s) Inhalation every 6 hours PRN Bronchospasm  dextrose Oral Gel 15 Gram(s) Oral once PRN Blood Glucose LESS THAN 70 milliGRAM(s)/deciliter  ondansetron Injectable 4 milliGRAM(s) IV Push every 6 hours PRN Nausea and/or Vomiting      Allergies    No Known Allergies    Intolerances        SOCIAL HISTORY:    FAMILY HISTORY:  Family history of lung cancer (Mother)    Family history of ischemic heart disease (IHD) (Father)        Vital Signs Last 24 Hrs  T(C): 37.5 (09 Aug 2023 12:00), Max: 37.5 (09 Aug 2023 12:00)  T(F): 99.5 (09 Aug 2023 12:00), Max: 99.5 (09 Aug 2023 12:00)  HR: 106 (09 Aug 2023 14:48) (52 - 106)  BP: 187/92 (09 Aug 2023 14:48) (91/50 - 215/97)  BP(mean): 131 (09 Aug 2023 14:48) (65 - 139)  RR: 25 (09 Aug 2023 14:48) (14 - 25)  SpO2: 92% (09 Aug 2023 14:48) (92% - 100%)    Parameters below as of 09 Aug 2023 12:00  Patient On (Oxygen Delivery Method): mask, Venturi    O2 Concentration (%): 40      LABS:                        11.2   9.60  )-----------( 117      ( 09 Aug 2023 05:08 )             33.6     08-09    143  |  110  |  23<H>  ----------------------------<  232<H>  4.5   |  21  |  1.1    Ca    8.8      09 Aug 2023 05:08  Phos  2.7     08-09  Mg     2.1     08-09    TPro  5.5<L>  /  Alb  3.2<L>  /  TBili  0.9  /  DBili  x   /  AST  13  /  ALT  10  /  AlkPhos  84  08-09    PT/INR - ( 09 Aug 2023 11:32 )   PT: 11.20 sec;   INR: 0.98 ratio         PTT - ( 09 Aug 2023 11:32 )  PTT:30.3 sec  Urinalysis Basic - ( 09 Aug 2023 05:08 )    Color: x / Appearance: x / SG: x / pH: x  Gluc: 232 mg/dL / Ketone: x  / Bili: x / Urobili: x   Blood: x / Protein: x / Nitrite: x   Leuk Esterase: x / RBC: x / WBC x   Sq Epi: x / Non Sq Epi: x / Bacteria: x        RADIOLOGY & ADDITIONAL STUDIES: HPI:  73M PMHx DM, HTN, HLD, h/o seizure disorders, h/o Bipolar Disorder, h/o BPH, GERD and h/o OM s/p multiple toe amputations presents to the ED for altered mental status. As per wife, patient had a witnessed seizure-like activity at home that lasted for several minutes. EMS was called and found patient to be severely hyperglycemic. Patient was arousable while en route to the hospital. Wife reports that patient has not been acting himself for the last 2 weeks. States that he has a drug problem and takes medications for his chronic pain. He has also been hiding his medications and she is unsure if he has been taking any of them. As per patient's wife, his medications should be the same since last admission except for one possible change, but she is unsure of which one.    Vitals: Temp 98.7F, /74, HR 95, RR 20, SpO2 99% on RA    Labs: Hgb 12.9 (previously 12.5), Na 130 (142 corrected for hyperglycemia), Glucose 859, Beta-Hydroxy 1.0, Serum Osm 343, Cr 1.6 (previously 1.1)    Imaging: CT Head non con: negative for acute intracranial pathology    In the ED:  - s/p 1L LR bolus  - started on Insulin drip  - s/p intubation for airway protection iso AMS and 1 episode of witness seizure in the ED  - s/p Narcan nebulizer x1  - OG tube placed - 1700cc output stomach content/bile    Admitted to MICU s/p intubation for airway protection and management of HHS. (07 Aug 2023 05:05)      Pt admitted for AMS, s/p intubation with suspected seizure activity, being treated for PNA. Dermatology consulted for evaluation of right arm blistering lesions with multiple small open wounds. Pt had IV in his right antecubital fossa. Pt has significant right arm swelling compared to left, with small blisters on the anterior and medical surface of the arm. Started on phenytoin recently but no other rashes and blistering lesions are very localized.     PAST MEDICAL & SURGICAL HISTORY:  DM (diabetes mellitus)  Type 2, 12/27/18 hgb aic  11.2      HTN (hypertension)      Dyslipidemia      Diabetic foot ulcer      Depression      GERD (gastroesophageal reflux disease)      Neuropathy, diabetic      Toe amputation status, right  (2008)      History of amputation of toe  LT -partial 1st toe            MEDICATIONS  (STANDING):  aspirin  chewable 81 milliGRAM(s) Oral daily  chlorhexidine 0.12% Liquid 15 milliLiter(s) Oral Mucosa two times a day  chlorhexidine 2% Cloths 1 Application(s) Topical daily  dexMEDEtomidine Infusion 0.2 MICROgram(s)/kG/Hr (4.18 mL/Hr) IV Continuous <Continuous>  dextrose 5%. 1000 milliLiter(s) (50 mL/Hr) IV Continuous <Continuous>  dextrose 5%. 1000 milliLiter(s) (100 mL/Hr) IV Continuous <Continuous>  dextrose 50% Injectable 25 Gram(s) IV Push once  dextrose 50% Injectable 25 Gram(s) IV Push once  dextrose 50% Injectable 12.5 Gram(s) IV Push once  fentaNYL   Infusion. 0.5 MICROgram(s)/kG/Hr (4 mL/Hr) IV Continuous <Continuous>  fondaparinux Injectable 2.5 milliGRAM(s) SubCutaneous daily  glucagon  Injectable 1 milliGRAM(s) IntraMuscular once  insulin glargine Injectable (LANTUS) 21 Unit(s) SubCutaneous every morning  insulin lispro (ADMELOG) corrective regimen sliding scale   SubCutaneous three times a day before meals  insulin lispro Injectable (ADMELOG) 7 Unit(s) SubCutaneous before breakfast  insulin lispro Injectable (ADMELOG) 7 Unit(s) SubCutaneous before lunch  insulin lispro Injectable (ADMELOG) 7 Unit(s) SubCutaneous before dinner  insulin regular Infusion 6 Unit(s)/Hr (6 mL/Hr) IV Continuous <Continuous>  lacosamide IVPB 100 milliGRAM(s) IV Intermittent every 12 hours  levETIRAcetam  IVPB 1000 milliGRAM(s) IV Intermittent every 12 hours  pantoprazole  Injectable 40 milliGRAM(s) IV Push daily  piperacillin/tazobactam IVPB.. 3.375 Gram(s) IV Intermittent every 8 hours  polyethylene glycol 3350 17 Gram(s) Oral two times a day  senna 2 Tablet(s) Oral at bedtime    MEDICATIONS  (PRN):  albuterol    90 MICROgram(s) HFA Inhaler 2 Puff(s) Inhalation every 6 hours PRN Bronchospasm  dextrose Oral Gel 15 Gram(s) Oral once PRN Blood Glucose LESS THAN 70 milliGRAM(s)/deciliter  ondansetron Injectable 4 milliGRAM(s) IV Push every 6 hours PRN Nausea and/or Vomiting      Allergies    No Known Allergies    Intolerances        SOCIAL HISTORY:    FAMILY HISTORY:  Family history of lung cancer (Mother)    Family history of ischemic heart disease (IHD) (Father)        Vital Signs Last 24 Hrs  T(C): 37.5 (09 Aug 2023 12:00), Max: 37.5 (09 Aug 2023 12:00)  T(F): 99.5 (09 Aug 2023 12:00), Max: 99.5 (09 Aug 2023 12:00)  HR: 106 (09 Aug 2023 14:48) (52 - 106)  BP: 187/92 (09 Aug 2023 14:48) (91/50 - 215/97)  BP(mean): 131 (09 Aug 2023 14:48) (65 - 139)  RR: 25 (09 Aug 2023 14:48) (14 - 25)  SpO2: 92% (09 Aug 2023 14:48) (92% - 100%)    Parameters below as of 09 Aug 2023 12:00  Patient On (Oxygen Delivery Method): mask, Venturi    O2 Concentration (%): 40      LABS:                        11.2   9.60  )-----------( 117      ( 09 Aug 2023 05:08 )             33.6     08-09    143  |  110  |  23<H>  ----------------------------<  232<H>  4.5   |  21  |  1.1    Ca    8.8      09 Aug 2023 05:08  Phos  2.7     08-09  Mg     2.1     08-09    TPro  5.5<L>  /  Alb  3.2<L>  /  TBili  0.9  /  DBili  x   /  AST  13  /  ALT  10  /  AlkPhos  84  08-09    PT/INR - ( 09 Aug 2023 11:32 )   PT: 11.20 sec;   INR: 0.98 ratio         PTT - ( 09 Aug 2023 11:32 )  PTT:30.3 sec  Urinalysis Basic - ( 09 Aug 2023 05:08 )    Color: x / Appearance: x / SG: x / pH: x  Gluc: 232 mg/dL / Ketone: x  / Bili: x / Urobili: x   Blood: x / Protein: x / Nitrite: x   Leuk Esterase: x / RBC: x / WBC x   Sq Epi: x / Non Sq Epi: x / Bacteria: x        RADIOLOGY & ADDITIONAL STUDIES: HPI:  73M PMHx DM, HTN, HLD, h/o seizure disorders, h/o Bipolar Disorder, h/o BPH, GERD and h/o OM s/p multiple toe amputations presents to the ED for altered mental status. As per wife, patient had a witnessed seizure-like activity at home that lasted for several minutes. EMS was called and found patient to be severely hyperglycemic. Patient was arousable while en route to the hospital. Wife reports that patient has not been acting himself for the last 2 weeks. States that he has a drug problem and takes medications for his chronic pain. He has also been hiding his medications and she is unsure if he has been taking any of them. As per patient's wife, his medications should be the same since last admission except for one possible change, but she is unsure of which one.    Vitals: Temp 98.7F, /74, HR 95, RR 20, SpO2 99% on RA    Labs: Hgb 12.9 (previously 12.5), Na 130 (142 corrected for hyperglycemia), Glucose 859, Beta-Hydroxy 1.0, Serum Osm 343, Cr 1.6 (previously 1.1)    Imaging: CT Head non con: negative for acute intracranial pathology    In the ED:  - s/p 1L LR bolus  - started on Insulin drip  - s/p intubation for airway protection iso AMS and 1 episode of witness seizure in the ED  - s/p Narcan nebulizer x1  - OG tube placed - 1700cc output stomach content/bile    Admitted to MICU s/p intubation for airway protection and management of HHS. (07 Aug 2023 05:05)      Pt admitted for AMS, s/p intubation with suspected seizure activity, being treated for PNA. Dermatology consulted for evaluation of right arm blistering lesions with multiple small open wounds. Pt had IV in his right antecubital fossa. Pt has significant right arm swelling compared to left, with small blisters on the anterior and medical surface of the arm. Started on phenytoin recently but no other rashes and blistering lesions are very localized.     PAST MEDICAL & SURGICAL HISTORY:  DM (diabetes mellitus)  Type 2, 12/27/18 hgb aic  11.2      HTN (hypertension)      Dyslipidemia      Diabetic foot ulcer      Depression      GERD (gastroesophageal reflux disease)      Neuropathy, diabetic      Toe amputation status, right  (2008)      History of amputation of toe  LT -partial 1st toe            MEDICATIONS  (STANDING):  aspirin  chewable 81 milliGRAM(s) Oral daily  chlorhexidine 0.12% Liquid 15 milliLiter(s) Oral Mucosa two times a day  chlorhexidine 2% Cloths 1 Application(s) Topical daily  dexMEDEtomidine Infusion 0.2 MICROgram(s)/kG/Hr (4.18 mL/Hr) IV Continuous <Continuous>  dextrose 5%. 1000 milliLiter(s) (50 mL/Hr) IV Continuous <Continuous>  dextrose 5%. 1000 milliLiter(s) (100 mL/Hr) IV Continuous <Continuous>  dextrose 50% Injectable 25 Gram(s) IV Push once  dextrose 50% Injectable 25 Gram(s) IV Push once  dextrose 50% Injectable 12.5 Gram(s) IV Push once  fentaNYL   Infusion. 0.5 MICROgram(s)/kG/Hr (4 mL/Hr) IV Continuous <Continuous>  fondaparinux Injectable 2.5 milliGRAM(s) SubCutaneous daily  glucagon  Injectable 1 milliGRAM(s) IntraMuscular once  insulin glargine Injectable (LANTUS) 21 Unit(s) SubCutaneous every morning  insulin lispro (ADMELOG) corrective regimen sliding scale   SubCutaneous three times a day before meals  insulin lispro Injectable (ADMELOG) 7 Unit(s) SubCutaneous before breakfast  insulin lispro Injectable (ADMELOG) 7 Unit(s) SubCutaneous before lunch  insulin lispro Injectable (ADMELOG) 7 Unit(s) SubCutaneous before dinner  insulin regular Infusion 6 Unit(s)/Hr (6 mL/Hr) IV Continuous <Continuous>  lacosamide IVPB 100 milliGRAM(s) IV Intermittent every 12 hours  levETIRAcetam  IVPB 1000 milliGRAM(s) IV Intermittent every 12 hours  pantoprazole  Injectable 40 milliGRAM(s) IV Push daily  piperacillin/tazobactam IVPB.. 3.375 Gram(s) IV Intermittent every 8 hours  polyethylene glycol 3350 17 Gram(s) Oral two times a day  senna 2 Tablet(s) Oral at bedtime    MEDICATIONS  (PRN):  albuterol    90 MICROgram(s) HFA Inhaler 2 Puff(s) Inhalation every 6 hours PRN Bronchospasm  dextrose Oral Gel 15 Gram(s) Oral once PRN Blood Glucose LESS THAN 70 milliGRAM(s)/deciliter  ondansetron Injectable 4 milliGRAM(s) IV Push every 6 hours PRN Nausea and/or Vomiting      Allergies    No Known Allergies    Intolerances        SOCIAL HISTORY:    FAMILY HISTORY:  Family history of lung cancer (Mother)    Family history of ischemic heart disease (IHD) (Father)        Vital Signs Last 24 Hrs  T(C): 37.5 (09 Aug 2023 12:00), Max: 37.5 (09 Aug 2023 12:00)  T(F): 99.5 (09 Aug 2023 12:00), Max: 99.5 (09 Aug 2023 12:00)  HR: 106 (09 Aug 2023 14:48) (52 - 106)  BP: 187/92 (09 Aug 2023 14:48) (91/50 - 215/97)  BP(mean): 131 (09 Aug 2023 14:48) (65 - 139)  RR: 25 (09 Aug 2023 14:48) (14 - 25)  SpO2: 92% (09 Aug 2023 14:48) (92% - 100%)    Parameters below as of 09 Aug 2023 12:00  Patient On (Oxygen Delivery Method): mask, Venturi    O2 Concentration (%): 40      LABS:                        11.2   9.60  )-----------( 117      ( 09 Aug 2023 05:08 )             33.6     08-09    143  |  110  |  23<H>  ----------------------------<  232<H>  4.5   |  21  |  1.1    Ca    8.8      09 Aug 2023 05:08  Phos  2.7     08-09  Mg     2.1     08-09    TPro  5.5<L>  /  Alb  3.2<L>  /  TBili  0.9  /  DBili  x   /  AST  13  /  ALT  10  /  AlkPhos  84  08-09    PT/INR - ( 09 Aug 2023 11:32 )   PT: 11.20 sec;   INR: 0.98 ratio         PTT - ( 09 Aug 2023 11:32 )  PTT:30.3 sec  Urinalysis Basic - ( 09 Aug 2023 05:08 )    Color: x / Appearance: x / SG: x / pH: x  Gluc: 232 mg/dL / Ketone: x  / Bili: x / Urobili: x   Blood: x / Protein: x / Nitrite: x   Leuk Esterase: x / RBC: x / WBC x   Sq Epi: x / Non Sq Epi: x / Bacteria: x        PHYSICAL EXAMINATION : (photos reviewed) small blisters and desquamating lesions on RUE anterior and medical aspect with edema of the arm

## 2023-08-09 NOTE — PHARMACOTHERAPY INTERVENTION NOTE - COMMENTS
Currently on extended infusion piperacillin/tazobactam dosed every 6 hours. Recommended to adjust frequency to every 8 hours.

## 2023-08-09 NOTE — PROGRESS NOTE ADULT - NUTRITIONAL ASSESSMENT
73M PMHx DM, HTN, HLD, h/o seizure disorders, h/o Bipolar Disorder, h/o BPH, GERD and h/o OM s/p multiple toe amputations admitted for hyperosmolar state with witnessed seizure-like activity at home that lasted for several minutes. Was intubated and sedated and extubated recently, and neurological exam was grossly non-focal. VEEG reported no ictal pattern reported, can d/c. His seizures most likely was related to HOS to hyperglycemia vs ASM non-compliance, polysubstance use.     Recommendations:     - c/w Keppra 1000 mg bid  - c/w Vimpat 100 mg bid    - c/w primary team treatment  - f/u w neurologist in OP settings after d/c         Discussed w attending Dr. Ontiveros

## 2023-08-10 LAB
ALBUMIN SERPL ELPH-MCNC: 3.3 G/DL — LOW (ref 3.5–5.2)
ALP SERPL-CCNC: 93 U/L — SIGNIFICANT CHANGE UP (ref 30–115)
ALT FLD-CCNC: 10 U/L — SIGNIFICANT CHANGE UP (ref 0–41)
ANION GAP SERPL CALC-SCNC: 16 MMOL/L — HIGH (ref 7–14)
AST SERPL-CCNC: 13 U/L — SIGNIFICANT CHANGE UP (ref 0–41)
BASOPHILS # BLD AUTO: 0.04 K/UL — SIGNIFICANT CHANGE UP (ref 0–0.2)
BASOPHILS NFR BLD AUTO: 0.4 % — SIGNIFICANT CHANGE UP (ref 0–1)
BILIRUB SERPL-MCNC: 1 MG/DL — SIGNIFICANT CHANGE UP (ref 0.2–1.2)
BUN SERPL-MCNC: 16 MG/DL — SIGNIFICANT CHANGE UP (ref 10–20)
CALCIUM SERPL-MCNC: 8.9 MG/DL — SIGNIFICANT CHANGE UP (ref 8.4–10.5)
CHLORIDE SERPL-SCNC: 106 MMOL/L — SIGNIFICANT CHANGE UP (ref 98–110)
CO2 SERPL-SCNC: 21 MMOL/L — SIGNIFICANT CHANGE UP (ref 17–32)
CREAT SERPL-MCNC: 1 MG/DL — SIGNIFICANT CHANGE UP (ref 0.7–1.5)
EGFR: 78 ML/MIN/1.73M2 — SIGNIFICANT CHANGE UP
EOSINOPHIL # BLD AUTO: 0 K/UL — SIGNIFICANT CHANGE UP (ref 0–0.7)
EOSINOPHIL NFR BLD AUTO: 0 % — SIGNIFICANT CHANGE UP (ref 0–8)
GLUCOSE BLDC GLUCOMTR-MCNC: 170 MG/DL — HIGH (ref 70–99)
GLUCOSE BLDC GLUCOMTR-MCNC: 176 MG/DL — HIGH (ref 70–99)
GLUCOSE BLDC GLUCOMTR-MCNC: 216 MG/DL — HIGH (ref 70–99)
GLUCOSE BLDC GLUCOMTR-MCNC: 224 MG/DL — HIGH (ref 70–99)
GLUCOSE BLDC GLUCOMTR-MCNC: 242 MG/DL — HIGH (ref 70–99)
GLUCOSE SERPL-MCNC: 260 MG/DL — HIGH (ref 70–99)
GRAM STN FLD: SIGNIFICANT CHANGE UP
HAPTOGLOB SERPL-MCNC: 175 MG/DL — SIGNIFICANT CHANGE UP (ref 34–200)
HCT VFR BLD CALC: 34.5 % — LOW (ref 42–52)
HEPARIN-PF4 AB RESULT: <0.6 U/ML — SIGNIFICANT CHANGE UP (ref 0–0.9)
HGB BLD-MCNC: 11.4 G/DL — LOW (ref 14–18)
IMM GRANULOCYTES NFR BLD AUTO: 1.4 % — HIGH (ref 0.1–0.3)
LEVETIRACETAM SERPL-MCNC: <2 UG/ML — LOW (ref 10–40)
LYMPHOCYTES # BLD AUTO: 0.7 K/UL — LOW (ref 1.2–3.4)
LYMPHOCYTES # BLD AUTO: 6.3 % — LOW (ref 20.5–51.1)
MAGNESIUM SERPL-MCNC: 2.2 MG/DL — SIGNIFICANT CHANGE UP (ref 1.8–2.4)
MCHC RBC-ENTMCNC: 29.5 PG — SIGNIFICANT CHANGE UP (ref 27–31)
MCHC RBC-ENTMCNC: 33 G/DL — SIGNIFICANT CHANGE UP (ref 32–37)
MCV RBC AUTO: 89.4 FL — SIGNIFICANT CHANGE UP (ref 80–94)
MONOCYTES # BLD AUTO: 0.75 K/UL — HIGH (ref 0.1–0.6)
MONOCYTES NFR BLD AUTO: 6.8 % — SIGNIFICANT CHANGE UP (ref 1.7–9.3)
NEUTROPHILS # BLD AUTO: 9.39 K/UL — HIGH (ref 1.4–6.5)
NEUTROPHILS NFR BLD AUTO: 85.1 % — HIGH (ref 42.2–75.2)
NRBC # BLD: 0 /100 WBCS — SIGNIFICANT CHANGE UP (ref 0–0)
NT-PROBNP SERPL-SCNC: 1051 PG/ML — HIGH (ref 0–300)
PF4 HEPARIN CMPLX AB SER-ACNC: NEGATIVE — SIGNIFICANT CHANGE UP
PLATELET # BLD AUTO: 161 K/UL — SIGNIFICANT CHANGE UP (ref 130–400)
PMV BLD: 11.9 FL — HIGH (ref 7.4–10.4)
POTASSIUM SERPL-MCNC: 4.1 MMOL/L — SIGNIFICANT CHANGE UP (ref 3.5–5)
POTASSIUM SERPL-SCNC: 4.1 MMOL/L — SIGNIFICANT CHANGE UP (ref 3.5–5)
PROT SERPL-MCNC: 5.6 G/DL — LOW (ref 6–8)
RBC # BLD: 3.86 M/UL — LOW (ref 4.7–6.1)
RBC # FLD: 14.2 % — SIGNIFICANT CHANGE UP (ref 11.5–14.5)
SODIUM SERPL-SCNC: 143 MMOL/L — SIGNIFICANT CHANGE UP (ref 135–146)
SPECIMEN SOURCE: SIGNIFICANT CHANGE UP
WBC # BLD: 11.04 K/UL — HIGH (ref 4.8–10.8)
WBC # FLD AUTO: 11.04 K/UL — HIGH (ref 4.8–10.8)

## 2023-08-10 PROCEDURE — 99291 CRITICAL CARE FIRST HOUR: CPT

## 2023-08-10 PROCEDURE — 71045 X-RAY EXAM CHEST 1 VIEW: CPT | Mod: 26

## 2023-08-10 RX ORDER — AMLODIPINE BESYLATE 2.5 MG/1
5 TABLET ORAL DAILY
Refills: 0 | Status: DISCONTINUED | OUTPATIENT
Start: 2023-08-10 | End: 2023-08-11

## 2023-08-10 RX ORDER — HYDRALAZINE HCL 50 MG
5 TABLET ORAL ONCE
Refills: 0 | Status: COMPLETED | OUTPATIENT
Start: 2023-08-10 | End: 2023-08-10

## 2023-08-10 RX ORDER — LABETALOL HCL 100 MG
10 TABLET ORAL ONCE
Refills: 0 | Status: COMPLETED | OUTPATIENT
Start: 2023-08-10 | End: 2023-08-10

## 2023-08-10 RX ORDER — LOSARTAN POTASSIUM 100 MG/1
50 TABLET, FILM COATED ORAL DAILY
Refills: 0 | Status: DISCONTINUED | OUTPATIENT
Start: 2023-08-10 | End: 2023-08-16

## 2023-08-10 RX ADMIN — Medication 1 APPLICATION(S): at 05:08

## 2023-08-10 RX ADMIN — Medication 5 MILLIGRAM(S): at 01:30

## 2023-08-10 RX ADMIN — Medication 2: at 17:34

## 2023-08-10 RX ADMIN — Medication 5 MILLIGRAM(S): at 21:15

## 2023-08-10 RX ADMIN — POLYETHYLENE GLYCOL 3350 17 GRAM(S): 17 POWDER, FOR SOLUTION ORAL at 17:28

## 2023-08-10 RX ADMIN — INSULIN GLARGINE 21 UNIT(S): 100 INJECTION, SOLUTION SUBCUTANEOUS at 09:13

## 2023-08-10 RX ADMIN — Medication 7 UNIT(S): at 17:36

## 2023-08-10 RX ADMIN — CHLORHEXIDINE GLUCONATE 1 APPLICATION(S): 213 SOLUTION TOPICAL at 12:16

## 2023-08-10 RX ADMIN — Medication 10 MILLIGRAM(S): at 22:07

## 2023-08-10 RX ADMIN — FONDAPARINUX SODIUM 2.5 MILLIGRAM(S): 2.5 INJECTION, SOLUTION SUBCUTANEOUS at 12:13

## 2023-08-10 RX ADMIN — Medication 81 MILLIGRAM(S): at 12:12

## 2023-08-10 RX ADMIN — CHLORHEXIDINE GLUCONATE 15 MILLILITER(S): 213 SOLUTION TOPICAL at 17:28

## 2023-08-10 RX ADMIN — PIPERACILLIN AND TAZOBACTAM 25 GRAM(S): 4; .5 INJECTION, POWDER, LYOPHILIZED, FOR SOLUTION INTRAVENOUS at 13:13

## 2023-08-10 RX ADMIN — Medication 4: at 12:28

## 2023-08-10 RX ADMIN — LOSARTAN POTASSIUM 50 MILLIGRAM(S): 100 TABLET, FILM COATED ORAL at 17:30

## 2023-08-10 RX ADMIN — Medication 4: at 06:03

## 2023-08-10 RX ADMIN — SENNA PLUS 2 TABLET(S): 8.6 TABLET ORAL at 21:33

## 2023-08-10 RX ADMIN — PANTOPRAZOLE SODIUM 40 MILLIGRAM(S): 20 TABLET, DELAYED RELEASE ORAL at 12:12

## 2023-08-10 RX ADMIN — LACOSAMIDE 100 MILLIGRAM(S): 50 TABLET ORAL at 18:25

## 2023-08-10 RX ADMIN — PIPERACILLIN AND TAZOBACTAM 25 GRAM(S): 4; .5 INJECTION, POWDER, LYOPHILIZED, FOR SOLUTION INTRAVENOUS at 21:33

## 2023-08-10 RX ADMIN — Medication 5 MILLIGRAM(S): at 12:29

## 2023-08-10 RX ADMIN — LEVETIRACETAM 400 MILLIGRAM(S): 250 TABLET, FILM COATED ORAL at 05:08

## 2023-08-10 RX ADMIN — AMLODIPINE BESYLATE 5 MILLIGRAM(S): 2.5 TABLET ORAL at 17:37

## 2023-08-10 RX ADMIN — Medication 7 UNIT(S): at 12:29

## 2023-08-10 RX ADMIN — PIPERACILLIN AND TAZOBACTAM 25 GRAM(S): 4; .5 INJECTION, POWDER, LYOPHILIZED, FOR SOLUTION INTRAVENOUS at 05:08

## 2023-08-10 RX ADMIN — Medication 1 APPLICATION(S): at 17:37

## 2023-08-10 RX ADMIN — Medication 10 MILLIGRAM(S): at 14:42

## 2023-08-10 RX ADMIN — CHLORHEXIDINE GLUCONATE 15 MILLILITER(S): 213 SOLUTION TOPICAL at 05:07

## 2023-08-10 RX ADMIN — LEVETIRACETAM 400 MILLIGRAM(S): 250 TABLET, FILM COATED ORAL at 17:34

## 2023-08-10 RX ADMIN — LACOSAMIDE 100 MILLIGRAM(S): 50 TABLET ORAL at 05:13

## 2023-08-10 RX ADMIN — Medication 10 MILLIGRAM(S): at 17:30

## 2023-08-10 NOTE — SWALLOW BEDSIDE ASSESSMENT ADULT - COMMENTS
RN reports patient is confused however much more alert than yesterday. RN reports patient is confused however much more alert than yesterday. As per wife, pt was eating regular solids and thin liquids at home. Wife denies any hx of dysphagia.

## 2023-08-10 NOTE — CHART NOTE - NSCHARTNOTEFT_GEN_A_CORE
MICU Transfer Note    Transfer from: MICU  Transfer to:  (  ) Medicine    (  ) Telemetry    (  ) RCU    (  ) Palliative    (  ) Stroke Unit    (  ) _______________    Chief Complaint: Altered Mental Status and Hyperglycemia    HPI:  73M PMHx DM, HTN, HLD, h/o seizure disorders, h/o Bipolar Disorder, h/o BPH, GERD and h/o OM s/p multiple toe amputations presents to the ED for altered mental status. As per wife, patient had a witnessed seizure-like activity at home that lasted for several minutes. EMS was called and found patient to be severely hyperglycemic. Patient was arousable while en route to the hospital. Wife reports that patient has not been acting himself for the last 2 weeks. States that he has a drug problem and takes medications for his chronic pain. He has also been hiding his medications and she is unsure if he has been taking any of them. As per patient's wife, his medications should be the same since last admission except for one possible change, but she is unsure of which one.    Admitted to MICU s/p intubation    MICU COURSE:  Patient receieved in MICU intubated on MV. Initially sedated on fentanyl and presedex, failed initial SBT trials and had been agitated/not following commands. Patient had not required pressors throughout ICU course. Patient would have multiple BP spikes to 180mmHg systolic requiring hydralazine push. In ICU platelet count downtrended to 117, heparin d/c'ed and started on fondaparinux, HIT and DIC panel negative, platelet counted uptrended back to 160s. Patient experienced upper extremity nonpitting edema, debridement of blister in RUE duplex positive for superficial embolism. CT Head twice performed, negative for acute pathology. Patient had been successfully extubated, though remained confused and intermittently AO 2x/AO1x requiring redirection to deescalate agitation.        ASSESSMENT & PLAN:   73M PMHx DM, HTN, HLD, h/o seizure disorders, h/o Bipolar Disorder, h/o BPH, GERD and h/o OM s/p multiple toe amputations presents to the ED for altered mental status. Admitted to ICU s/p intubation for airway protection iso AMS and management of HHS.    #Upper Extremity swelling  - RUE duplex performed, pending result  - seen by burn, derm, debridement performed on RUE    #thrombocytopenia  -platelet count 161<117<146<177<199  - DIC, HIT panels noted, negative  - peripheral smear  - fondaparinux    #DKA/HHS  -insulin infusion  -trend finger stick, lactate  -bmp qd    #AMS  #seizures  -EEG  focal and generalized slowing and interictal as described above, d/c as per neuro  -CTH -ve 8/9/23, noted  -c/w keppra   -check keppra levels  - vimpat levelsnoted  -drug screen -ve  -blood, urine cultures negative  -RVP negative  -procal noted 2.73 on 8/7/23  -BMP qd  - extubated to 4L NC- now on 3L  - wean O2 as tolerated  -neuro recs appreciated    #PNA  -c/w zosyn on day 3 of 5  -bcx noted, WNL  -ABG qd  -CXR qd  -CXR stable    -bowel regimen  -daily CBC, CMP  - aspiration precautions  -NPO via OG tube  -NGT if necessary  - f/u speech swallow  -f/u TOV  -monitor mental status  ----------------------------------------------------  # DVT prophylaxis: fondaparinux  # GI prophylaxis: protonix  # Diet: NPO with tube feeds  # Activity: bed rest, increase as tolerated  # Code status: full code        For Follow-Up:      General: No acute distress; Pallor (-), Icterus (-), Cyanosis (-), Clubbing (-)  HEENT: Normocephalic, atraumatic, PERRLA, EOMI  PULM: Bilaterally equal and clear breath sounds, wheeze (-), rubs (-), crackles (-)  CVS: Normal S1 and S2, murmurs (-), rubs (-), gallops (-)   GI: Soft, nondistended, nontender, BS +  MSK: Edema (-), no muscle, bone or joint tenderness noted  SKIN: Warm and well perfused, no rashes noted  NEURO:  Alert and Oriented x 3; No gross focal neurological deficit noted      Vital Signs Last 24 Hrs  T(C): 37.6 (10 Aug 2023 08:00), Max: 37.7 (09 Aug 2023 17:00)  T(F): 99.6 (10 Aug 2023 08:00), Max: 99.9 (09 Aug 2023 17:00)  HR: 114 (10 Aug 2023 10:00) (67 - 124)  BP: 177/82 (10 Aug 2023 10:00) (130/61 - 204/93)  BP(mean): 117 (10 Aug 2023 10:00) (88 - 134)  RR: 30 (10 Aug 2023 10:00) (15 - 35)  SpO2: 100% (10 Aug 2023 10:00) (92% - 100%)    Parameters below as of 10 Aug 2023 08:15  Patient On (Oxygen Delivery Method): nasal cannula  O2 Flow (L/min): 2      I&O's Summary    09 Aug 2023 07:01  -  10 Aug 2023 07:00  --------------------------------------------------------  IN: 655 mL / OUT: 1782 mL / NET: -1127 mL    10 Aug 2023 07:01  -  10 Aug 2023 10:39  --------------------------------------------------------  IN: 0 mL / OUT: 70 mL / NET: -70 mL          MEDICATIONS  (STANDING):  aspirin  chewable 81 milliGRAM(s) Oral daily  chlorhexidine 0.12% Liquid 15 milliLiter(s) Oral Mucosa two times a day  chlorhexidine 2% Cloths 1 Application(s) Topical daily  fondaparinux Injectable 2.5 milliGRAM(s) SubCutaneous daily  insulin glargine Injectable (LANTUS) 21 Unit(s) SubCutaneous every morning  insulin lispro (ADMELOG) corrective regimen sliding scale   SubCutaneous three times a day before meals  insulin lispro Injectable (ADMELOG) 7 Unit(s) SubCutaneous before lunch  insulin lispro Injectable (ADMELOG) 7 Unit(s) SubCutaneous before dinner  insulin lispro Injectable (ADMELOG) 7 Unit(s) SubCutaneous before breakfast  lacosamide IVPB 100 milliGRAM(s) IV Intermittent every 12 hours  levETIRAcetam  IVPB 1000 milliGRAM(s) IV Intermittent every 12 hours  pantoprazole  Injectable 40 milliGRAM(s) IV Push daily  piperacillin/tazobactam IVPB.. 3.375 Gram(s) IV Intermittent every 8 hours  polyethylene glycol 3350 17 Gram(s) Oral two times a day  senna 2 Tablet(s) Oral at bedtime  silver sulfADIAZINE 1% Cream 1 Application(s) Topical two times a day    MEDICATIONS  (PRN):        LABS                                            11.4                  Neurophils% (auto):   85.1   (08-10 @ 05:50):    11.04)-----------(161          Lymphocytes% (auto):  6.3                                           34.5                   Eosinphils% (auto):   0.0      Manual%: Neutrophils x    ; Lymphocytes x    ; Eosinophils x    ; Bands%: x    ; Blasts x                                    143    |  106    |  16                  Calcium: 8.9   / iCa: x      (08-10 @ 05:50)    ----------------------------<  260       Magnesium: 2.2                              4.1     |  21     |  1.0              Phosphorous: x        TPro  5.6    /  Alb  3.3    /  TBili  1.0    /  DBili  x      /  AST  13     /  ALT  10     /  AlkPhos  93     10 Aug 2023 05:50    ( 08-09 @ 11:32 )   PT: 11.20 sec;   INR: 0.98 ratio  aPTT: 30.3 sec MICU Transfer Note    Transfer from: MICU  Transfer to:  (  ) Medicine    (  ) Telemetry    (  ) RCU    (  ) Palliative    (  ) Stroke Unit    (  ) _______________    Chief Complaint: Altered Mental Status and Hyperglycemia    HPI:  73M PMHx DM, HTN, HLD, h/o seizure disorders, h/o Bipolar Disorder, h/o BPH, GERD and h/o OM s/p multiple toe amputations presents to the ED for altered mental status. As per wife, patient had a witnessed seizure-like activity at home that lasted for several minutes. EMS was called and found patient to be severely hyperglycemic. Patient was arousable while en route to the hospital. Wife reports that patient has not been acting himself for the last 2 weeks. States that he has a drug problem and takes medications for his chronic pain. He has also been hiding his medications and she is unsure if he has been taking any of them. As per patient's wife, his medications should be the same since last admission except for one possible change, but she is unsure of which one.    Admitted to MICU s/p intubation    MICU COURSE:  Patient receieved in MICU intubated on MV. Initially sedated on fentanyl and presedex, failed initial SBT trials and had been agitated/not following commands. Patient had not required pressors throughout ICU course. Patient would have multiple BP spikes to 180mmHg systolic requiring hydralazine push. In ICU platelet count downtrended to 117, heparin d/c'ed and started on fondaparinux, HIT and DIC panel negative, platelet counted uptrended back to 160s. Patient experienced upper extremity nonpitting edema, debridement of blister in RUE duplex positive for superficial embolism. CT Head twice performed, negative for acute pathology. Patient had been successfully extubated, though remained confused and intermittently AO 2x/AO1x requiring redirection to deescalate agitation.    A&P    73M PMHx DM, HTN, HLD, h/o seizure disorders, h/o Bipolar Disorder, h/o BPH, GERD and h/o OM s/p multiple toe amputations presents to the ED for altered mental status. Admitted to ICU s/p intubation for airway protection iso AMS and management of HHS.    #AMS - resolved  #seizures  -EEG  focal and generalized slowing and interictal as described above, d/c as per neuro  -CTH -ve 8/9/23, noted  -c/w keppra   -check keppra levels  - vimpat levelsnoted  -drug screen -ve  -blood, urine cultures negative  -RVP negative  -procal noted 2.73 on 8/7/23  -BMP qd  - extubated to 4L NC- now on 3L  - wean O2 as tolerated  -neuro recs appreciated    #PNA  -c/w zosyn on day 3 of 5  -bcx noted, WNL  -ABG qd  -CXR qd  -CXR stable    #Upper Extremity swelling  - RUE duplex performed, pending result  - seen by burn, derm, debridement performed on RUE    #DKA/HHS  #DM2  - s/p insulin infusion  -basal-bolus regimen now that he's eating  -trend finger stick, lactate  -bmp qd    #HTN  - has required labetalol and hydralazine for hypertensive urgency  - started amlodipine 5 qd and losartan 50qd    #thrombocytopenia - resolved  -platelet count 161<117<146<177<199  - DIC, HIT panels noted, negative  - peripheral smear  - fondaparinux    -bowel regimen  -daily CBC, CMP  - aspiration precautions  -f/u TOV  -monitor mental status  ----------------------------------------------------  # DVT prophylaxis: fondaparinux, heparin  # GI prophylaxis: protonix  # Diet: pureed diet with thin liquids and 1:1 feeds per speech and swallow  # Activity: out of bed to chair, increase as tolerated  # Code status: full code  # Disposition: SDU  ----------------------------------------------------          For Follow-Up:      General: No acute distress; Pallor (-), Icterus (-), Cyanosis (-), Clubbing (-)  HEENT: Normocephalic, atraumatic, PERRLA, EOMI  PULM: Bilaterally equal and clear breath sounds, wheeze (-), rubs (-), crackles (-)  CVS: Normal S1 and S2, murmurs (-), rubs (-), gallops (-)   GI: Soft, nondistended, nontender, BS +  MSK: Edema (-), no muscle, bone or joint tenderness noted  SKIN: Warm and well perfused, no rashes noted  NEURO:  Alert and Oriented x 3; No gross focal neurological deficit noted      Vital Signs Last 24 Hrs  T(C): 37.6 (10 Aug 2023 08:00), Max: 37.7 (09 Aug 2023 17:00)  T(F): 99.6 (10 Aug 2023 08:00), Max: 99.9 (09 Aug 2023 17:00)  HR: 114 (10 Aug 2023 10:00) (67 - 124)  BP: 177/82 (10 Aug 2023 10:00) (130/61 - 204/93)  BP(mean): 117 (10 Aug 2023 10:00) (88 - 134)  RR: 30 (10 Aug 2023 10:00) (15 - 35)  SpO2: 100% (10 Aug 2023 10:00) (92% - 100%)    Parameters below as of 10 Aug 2023 08:15  Patient On (Oxygen Delivery Method): nasal cannula  O2 Flow (L/min): 2      I&O's Summary    09 Aug 2023 07:01  -  10 Aug 2023 07:00  --------------------------------------------------------  IN: 655 mL / OUT: 1782 mL / NET: -1127 mL    10 Aug 2023 07:01  -  10 Aug 2023 10:39  --------------------------------------------------------  IN: 0 mL / OUT: 70 mL / NET: -70 mL          MEDICATIONS  (STANDING):  aspirin  chewable 81 milliGRAM(s) Oral daily  chlorhexidine 0.12% Liquid 15 milliLiter(s) Oral Mucosa two times a day  chlorhexidine 2% Cloths 1 Application(s) Topical daily  fondaparinux Injectable 2.5 milliGRAM(s) SubCutaneous daily  insulin glargine Injectable (LANTUS) 21 Unit(s) SubCutaneous every morning  insulin lispro (ADMELOG) corrective regimen sliding scale   SubCutaneous three times a day before meals  insulin lispro Injectable (ADMELOG) 7 Unit(s) SubCutaneous before lunch  insulin lispro Injectable (ADMELOG) 7 Unit(s) SubCutaneous before dinner  insulin lispro Injectable (ADMELOG) 7 Unit(s) SubCutaneous before breakfast  lacosamide IVPB 100 milliGRAM(s) IV Intermittent every 12 hours  levETIRAcetam  IVPB 1000 milliGRAM(s) IV Intermittent every 12 hours  pantoprazole  Injectable 40 milliGRAM(s) IV Push daily  piperacillin/tazobactam IVPB.. 3.375 Gram(s) IV Intermittent every 8 hours  polyethylene glycol 3350 17 Gram(s) Oral two times a day  senna 2 Tablet(s) Oral at bedtime  silver sulfADIAZINE 1% Cream 1 Application(s) Topical two times a day    MEDICATIONS  (PRN):        LABS                                            11.4                  Neurophils% (auto):   85.1   (08-10 @ 05:50):    11.04)-----------(161          Lymphocytes% (auto):  6.3                                           34.5                   Eosinphils% (auto):   0.0      Manual%: Neutrophils x    ; Lymphocytes x    ; Eosinophils x    ; Bands%: x    ; Blasts x                                    143    |  106    |  16                  Calcium: 8.9   / iCa: x      (08-10 @ 05:50)    ----------------------------<  260       Magnesium: 2.2                              4.1     |  21     |  1.0              Phosphorous: x        TPro  5.6    /  Alb  3.3    /  TBili  1.0    /  DBili  x      /  AST  13     /  ALT  10     /  AlkPhos  93     10 Aug 2023 05:50    ( 08-09 @ 11:32 )   PT: 11.20 sec;   INR: 0.98 ratio  aPTT: 30.3 sec

## 2023-08-10 NOTE — PROGRESS NOTE ADULT - ASSESSMENT
IMPRESSION:    Acute hypoxemic respiratory failure SP extubation   DKA / HHS   Possible aspiration treated   Suspected seizure   Ho seizure disorder  HO bipolar disorder  Recently treated MSSA bacteremia secondary to toe OM  TYLER improving   AMS improving     PLAN:    CNS:  VEEG negative.  Continue Keppra and Vimpat.  FU levels. Repeat CTH negative     HEENT: Oral care.  Speech and Swallow.      PULMONARY:  HOB @ 45 degrees. Wean FiO2.  Aspiration precaution.      CARDIOVASCULAR: Avoid overload.     GI: GI prophylaxis.  Feeding per speech.  Might NGT.  Bowel regimen     RENAL:  Follow up lytes.  Correct as needed.      INFECTIOUS DISEASE: Follow up cultures, Finish Zosyn course.       HEMATOLOGICAL:  DVT prophylaxis. LE duplex negative.  HIT neg.      ENDOCRINE:  Follow up FS.  Insulin protocol if needed     MUSCULOSKELETAL: bed chair position     Possible sdu     Prognosis overall poor     Voiding trial

## 2023-08-10 NOTE — PROGRESS NOTE ADULT - ASSESSMENT
73M PMHx DM, HTN, HLD, h/o seizure disorders, h/o Bipolar Disorder, h/o BPH, GERD and h/o OM s/p multiple toe amputations presents to the ED for altered mental status. Admitted to ICU s/p intubation for airway protection iso AMS and management of HHS.    #Upper Extremity swelling  - RUE duplex performed, pending result  - seen by burn, derm, debridement performed on RUE    #thrombocytopenia  -platelet count 161<117<146<177<199  - DIC, HIT panels noted, negative  - peripheral smear  - fondaparinux    #DKA/HHS  -insulin infusion  -trend finger stick, lactate  -bmp qd    #AMS  #seizures  -EEG  focal and generalized slowing and interictal as described above, d/c as per neuro  -CTH -ve 8/9/23, noted  -c/w keppra   -check keppra levels  - vimpat levelsnoted  -drug screen -ve  -blood, urine cultures negative  -RVP negative  -procal noted 2.73 on 8/7/23  -BMP qd  - extubated to 4L NC- now on 3L  - wean O2 as tolerated  -neuro recs appreciated    #PNA  -c/w zosyn on day 3 of 5  -bcx noted, WNL  -ABG qd  -CXR qd  -CXR stable    -bowel regimen  -daily CBC, CMP  - aspiration precautions  -NGT if necessary  -f/u speech swallow for NPO  -f/u TOV  -monitor mental status  ----------------------------------------------------  # DVT prophylaxis: fondaparinux  # GI prophylaxis: protonix  # Diet: NPO with tube feeds  # Activity: bed rest, increase as tolerated  # Code status: full code  # Disposition: acute  ----------------------------------------------------   73M PMHx DM, HTN, HLD, h/o seizure disorders, h/o Bipolar Disorder, h/o BPH, GERD and h/o OM s/p multiple toe amputations presents to the ED for altered mental status. Admitted to ICU s/p intubation for airway protection iso AMS and management of HHS.    #Upper Extremity swelling  - RUE duplex performed, pending result  - seen by burn, derm, debridement performed on RUE    #thrombocytopenia  -platelet count 161<117<146<177<199  - DIC, HIT panels noted, negative  - peripheral smear  - fondaparinux    #DKA/HHS  -insulin infusion  -trend finger stick, lactate  -bmp qd    #AMS  #seizures  -EEG  focal and generalized slowing and interictal as described above, d/c as per neuro  -CTH -ve 8/9/23, noted  -c/w keppra   -check keppra levels  - vimpat levelsnoted  -drug screen -ve  -blood, urine cultures negative  -RVP negative  -procal noted 2.73 on 8/7/23  -BMP qd  - extubated to 4L NC- now on 3L  - wean O2 as tolerated  -neuro recs appreciated    #PNA  -c/w zosyn on day 3 of 5  -bcx noted, WNL  -ABG qd  -CXR qd  -CXR stable    -bowel regimen  -daily CBC, CMP  - aspiration precautions  -NPO via OG tube  -NGT if necessary  - f/u speech swallow  -f/u TOV  -monitor mental status  ----------------------------------------------------  # DVT prophylaxis: fondaparinux  # GI prophylaxis: protonix  # Diet: NPO with tube feeds  # Activity: bed rest, increase as tolerated  # Code status: full code  # Disposition: acute  ----------------------------------------------------   73M PMHx DM, HTN, HLD, h/o seizure disorders, h/o Bipolar Disorder, h/o BPH, GERD and h/o OM s/p multiple toe amputations presents to the ED for altered mental status. Admitted to ICU s/p intubation for airway protection iso AMS and management of HHS.    #AMS - resolved  #seizures  -EEG  focal and generalized slowing and interictal as described above, d/c as per neuro  -CTH -ve 8/9/23, noted  -c/w keppra   -check keppra levels  - vimpat levelsnoted  -drug screen -ve  -blood, urine cultures negative  -RVP negative  -procal noted 2.73 on 8/7/23  -BMP qd  - extubated to 4L NC- now on 3L  - wean O2 as tolerated  -neuro recs appreciated    #PNA  -c/w zosyn on day 3 of 5  -bcx noted, WNL  -ABG qd  -CXR qd  -CXR stable    #Upper Extremity swelling  - RUE duplex performed, pending result  - seen by burn, derm, debridement performed on RUE    #DKA/HHS  #DM2  - s/p insulin infusion  -basal-bolus regimen now that he's eating  -trend finger stick, lactate  -bmp qd    #HTN  - has required labetalol and hydralazine for hypertensive urgency  - started amlodipine 5 qd and losartan 50qd    #thrombocytopenia - resolved  -platelet count 161<117<146<177<199  - DIC, HIT panels noted, negative  - peripheral smear  - fondaparinux    -bowel regimen  -daily CBC, CMP  - aspiration precautions  -f/u TOV  -monitor mental status  ----------------------------------------------------  # DVT prophylaxis: fondaparinux, heparin  # GI prophylaxis: protonix  # Diet: pureed diet with thin liquids and 1:1 feeds per speech and swallow  # Activity: out of bed to chair, increase as tolerated  # Code status: full code  # Disposition: SDU  ----------------------------------------------------

## 2023-08-10 NOTE — PROGRESS NOTE ADULT - SUBJECTIVE AND OBJECTIVE BOX
Patient is a 73y old  Male who presents with a chief complaint of Altered Mental Status and Hyperglycemia (09 Aug 2023 17:39)        Over Night Events:  Extubated yesterday.  Improving MS.  off pressors.  On 3 liters O2         ROS:     All ROS are negative except HPI         PHYSICAL EXAM    ICU Vital Signs Last 24 Hrs  T(C): 37.4 (10 Aug 2023 04:00), Max: 37.7 (09 Aug 2023 17:00)  T(F): 99.3 (10 Aug 2023 04:00), Max: 99.9 (09 Aug 2023 17:00)  HR: 84 (10 Aug 2023 07:00) (54 - 106)  BP: 143/65 (10 Aug 2023 07:00) (121/56 - 204/93)  BP(mean): 94 (10 Aug 2023 07:00) (81 - 134)  ABP: --  ABP(mean): --  RR: 21 (10 Aug 2023 07:00) (15 - 31)  SpO2: 100% (10 Aug 2023 07:00) (92% - 100%)    O2 Parameters below as of 10 Aug 2023 04:00  Patient On (Oxygen Delivery Method): nasal cannula  O2 Flow (L/min): 4          CONSTITUTIONAL:  IN  NAD    ENT:   Airway patent,   Mouth with normal mucosa.       EYES:   Pupils equal,   Round and reactive to light.    CARDIAC:   Normal rate,   Regular rhythm.        RESPIRATORY:   No wheezing  Bilateral BS  Normal chest expansion  Not tachypneic,  No use of accessory muscles    GASTROINTESTINAL:  Abdomen soft,   Non-tender,   No guarding,   + BS    MUSCULOSKELETAL:   Range of motion is not limited,  No clubbing, cyanosis    NEUROLOGICAL:   Alert   No motor  deficits.    SKIN:   Skin normal color for race,   No evidence of rash.        08-09-23 @ 07:01  -  08-10-23 @ 07:00  --------------------------------------------------------  IN:    Glucerna: 55 mL    IV PiggyBack: 600 mL  Total IN: 655 mL    OUT:    Dexmedetomidine: 0 mL    Indwelling Catheter - Urethral (mL): 1782 mL    Lactated Ringers: 0 mL  Total OUT: 1782 mL    Total NET: -1127 mL          LABS:                            11.4   11.04 )-----------( 161      ( 10 Aug 2023 05:50 )             34.5                                               08-10    143  |  106  |  16  ----------------------------<  260<H>  4.1   |  21  |  1.0    Ca    8.9      10 Aug 2023 05:50  Phos  2.7     08-09  Mg     2.2     08-10    TPro  5.6<L>  /  Alb  3.3<L>  /  TBili  1.0  /  DBili  x   /  AST  13  /  ALT  10  /  AlkPhos  93  08-10      PT/INR - ( 09 Aug 2023 11:32 )   PT: 11.20 sec;   INR: 0.98 ratio         PTT - ( 09 Aug 2023 11:32 )  PTT:30.3 sec                                       Urinalysis Basic - ( 10 Aug 2023 05:50 )    Color: x / Appearance: x / SG: x / pH: x  Gluc: 260 mg/dL / Ketone: x  / Bili: x / Urobili: x   Blood: x / Protein: x / Nitrite: x   Leuk Esterase: x / RBC: x / WBC x   Sq Epi: x / Non Sq Epi: x / Bacteria: x                                                  LIVER FUNCTIONS - ( 10 Aug 2023 05:50 )  Alb: 3.3 g/dL / Pro: 5.6 g/dL / ALK PHOS: 93 U/L / ALT: 10 U/L / AST: 13 U/L / GGT: x                                                  Culture - Tissue with Gram Stain (collected 09 Aug 2023 13:07)  Source: .Tissue RUE  Gram Stain (10 Aug 2023 01:59):    No polymorphonuclear leukocytes seen per low power field    No organisms seen per oil power field    Culture - Blood (collected 08 Aug 2023 12:20)  Source: .Blood None  Preliminary Report (09 Aug 2023 23:01):    No growth at 24 hours                                                   Mode: CPAP with PS  FiO2: 40  PEEP: 8  PS: 6                                      ABG - ( 09 Aug 2023 10:16 )  pH, Arterial: 7.37  pH, Blood: x     /  pCO2: 43    /  pO2: 92    / HCO3: 25    / Base Excess: -0.6  /  SaO2: 99.0                MEDICATIONS  (STANDING):  aspirin  chewable 81 milliGRAM(s) Oral daily  chlorhexidine 0.12% Liquid 15 milliLiter(s) Oral Mucosa two times a day  chlorhexidine 2% Cloths 1 Application(s) Topical daily  fondaparinux Injectable 2.5 milliGRAM(s) SubCutaneous daily  insulin glargine Injectable (LANTUS) 21 Unit(s) SubCutaneous every morning  insulin lispro (ADMELOG) corrective regimen sliding scale   SubCutaneous three times a day before meals  insulin lispro Injectable (ADMELOG) 7 Unit(s) SubCutaneous before breakfast  insulin lispro Injectable (ADMELOG) 7 Unit(s) SubCutaneous before lunch  insulin lispro Injectable (ADMELOG) 7 Unit(s) SubCutaneous before dinner  lacosamide IVPB 100 milliGRAM(s) IV Intermittent every 12 hours  levETIRAcetam  IVPB 1000 milliGRAM(s) IV Intermittent every 12 hours  pantoprazole  Injectable 40 milliGRAM(s) IV Push daily  piperacillin/tazobactam IVPB.. 3.375 Gram(s) IV Intermittent every 8 hours  polyethylene glycol 3350 17 Gram(s) Oral two times a day  senna 2 Tablet(s) Oral at bedtime  silver sulfADIAZINE 1% Cream 1 Application(s) Topical two times a day    MEDICATIONS  (PRN):

## 2023-08-10 NOTE — PROGRESS NOTE ADULT - SUBJECTIVE AND OBJECTIVE BOX
24H events:    Patient is a 73y old Male who presents with a chief complaint of Altered Mental Status and Hyperglycemia (10 Aug 2023 08:48)    Primary diagnosis of Acute respiratory failure with hypoxia    Today is hospital day 3d. This morning patient was seen and examined at bedside, resting comfortably in bed, appears confused by redirectable.    Patient extubated yesterday in AM. Overnight patient seen by [facility] Dermatology. Debridement performed for blister on right upper extremity. Overnight patient was agitated, BP elevated to 180 systolic, received 5mg Hydralazine. No BM overnight. Patient currently NPO. Voiding via lubin catheter.    Code Status:    PAST MEDICAL & SURGICAL HISTORY  DM (diabetes mellitus)  Type 2, 12/27/18 hgb aic  11.2    HTN (hypertension)    Dyslipidemia    Diabetic foot ulcer    Depression    GERD (gastroesophageal reflux disease)    Neuropathy, diabetic    Toe amputation status, right  (2008)    History of amputation of toe  LT -partial 1st toe      SOCIAL HISTORY:  Social History:      ALLERGIES:  No Known Allergies    MEDICATIONS:  STANDING MEDICATIONS  aspirin  chewable 81 milliGRAM(s) Oral daily  chlorhexidine 0.12% Liquid 15 milliLiter(s) Oral Mucosa two times a day  chlorhexidine 2% Cloths 1 Application(s) Topical daily  fondaparinux Injectable 2.5 milliGRAM(s) SubCutaneous daily  insulin glargine Injectable (LANTUS) 21 Unit(s) SubCutaneous every morning  insulin lispro (ADMELOG) corrective regimen sliding scale   SubCutaneous three times a day before meals  insulin lispro Injectable (ADMELOG) 7 Unit(s) SubCutaneous before breakfast  insulin lispro Injectable (ADMELOG) 7 Unit(s) SubCutaneous before lunch  insulin lispro Injectable (ADMELOG) 7 Unit(s) SubCutaneous before dinner  lacosamide IVPB 100 milliGRAM(s) IV Intermittent every 12 hours  levETIRAcetam  IVPB 1000 milliGRAM(s) IV Intermittent every 12 hours  pantoprazole  Injectable 40 milliGRAM(s) IV Push daily  piperacillin/tazobactam IVPB.. 3.375 Gram(s) IV Intermittent every 8 hours  polyethylene glycol 3350 17 Gram(s) Oral two times a day  senna 2 Tablet(s) Oral at bedtime  silver sulfADIAZINE 1% Cream 1 Application(s) Topical two times a day    PRN MEDICATIONS    VITALS:   T(F): 99.6  HR: 86  BP: 163/70  RR: 18  SpO2: 100%    PHYSICAL EXAM:  GENERAL:   ( x ) NAD, lying in bed comfortably     (  ) obtunded     (  ) lethargic     (  ) somnolent    HEAD:   ( x ) Atraumatic     (  ) hematoma     (  ) laceration (specify location:       )     NECK:  ( x ) Supple     (  ) neck stiffness     (  ) nuchal rigidity     ( x )  no JVD     (  ) JVD present ( -- cm)    HEART:  Rate -->     ( x ) normal rate     (  ) bradycardic     (  ) tachycardic  Rhythm -->     ( x ) regular     (  ) regularly irregular     (  ) irregularly irregular  Murmurs -->     ( x ) normal s1s2     (  ) systolic murmur     (  ) diastolic murmur     (  ) continuous murmur      (  ) S3 present     (  ) S4 present    LUNGS:   ( x )Unlabored respirations     (  ) tachypnea  ( x ) B/L air entry     (  ) decreased breath sounds in:  (location     )    ( x ) on 4L nasal cannula  (  ) no adventitious sound     (  ) crackles     (  ) wheezing      (  ) rhonchi      (specify location:       )  (  ) chest wall tenderness (specify location:       )    ABDOMEN:   ( x ) Soft     (  ) tense   |   ( x ) nondistended     (  ) distended   |   ( x ) +BS     (  ) hypoactive bowel sounds     (  ) hyperactive bowel sounds  ( x ) nontender     (  ) RUQ tenderness     (  ) RLQ tenderness     (  ) LLQ tenderness     (  ) epigastric tenderness     (  ) diffuse tenderness  (  ) Splenomegaly      (  ) Hepatomegaly      (  ) Jaundice     (  ) ecchymosis     EXTREMITIES: 2+ peripheral pulses bilaterally. No clubbing, cyanosis, or edema  ( x ) Normal     (  ) Rash     (  ) ecchymosis     (  ) varicose veins      (  ) pitting edema     ( x ) non-pitting edema   (  ) ulceration     (  ) gangrene:     (location: batool upper extremities    )    NERVOUS SYSTEM:    (  ) A&Ox3     ( x ) confused     (  ) lethargic  CN II-XII:     (  ) Intact     (  ) deficits found     (Specify:     )   Upper extremities:     ( x ) no sensorimotor deficits     (  ) weakness     (  ) loss of proprioception/vibration     (  ) loss of touch/temperature (specify:    )  Lower extremities:     ( x ) no sensorimotor deficits     (  ) weakness     (  ) loss of proprioception/vibration     (  ) loss of touch/temperature (specify:    )    SKIN:   (  ) No rashes or lesions     (  ) maculopapular rash     (  ) pustules     (  ) vesicles     (  ) ulcer     (  ) ecchymosis     (specify location:     )  s/p debridement RUE    [facility] score:    ( x ) Indwelling Lubin Catheter:   Date insterted:    Reason (  ) Critical illness     (  ) urinary retention    (  ) Accurate Ins/Outs Monitoring     (  ) CMO patient    (  ) Central Line:   Date inserted:  Location: (  ) Right IJ     (  ) Left IJ     (  ) Right Fem     (  ) Left Fem    (  ) SPC        (  ) pigtail       (  ) PEG tube       (  ) colostomy       (  ) jejunostomy  (  ) U-Dall    LABS:                        11.4   11.04 )-----------( 161      ( 10 Aug 2023 05:50 )             34.5     08-10    143  |  106  |  16  ----------------------------<  260<H>  4.1   |  21  |  1.0    Ca    8.9      10 Aug 2023 05:50  Phos  2.7     08-09  Mg     2.2     08-10    TPro  5.6<L>  /  Alb  3.3<L>  /  TBili  1.0  /  DBili  x   /  AST  13  /  ALT  10  /  AlkPhos  93  08-10    PT/INR - ( 09 Aug 2023 11:32 )   PT: 11.20 sec;   INR: 0.98 ratio         PTT - ( 09 Aug 2023 11:32 )  PTT:30.3 sec  Urinalysis Basic - ( 10 Aug 2023 05:50 )    Color: x / Appearance: x / SG: x / pH: x  Gluc: 260 mg/dL / Ketone: x  / Bili: x / Urobili: x   Blood: x / Protein: x / Nitrite: x   Leuk Esterase: x / RBC: x / WBC x   Sq Epi: x / Non Sq Epi: x / Bacteria: x      ABG - ( 09 Aug 2023 10:16 )  pH, Arterial: 7.37  pH, Blood: x     /  pCO2: 43    /  pO2: 92    / HCO3: 25    / Base Excess: -0.6  /  SaO2: 99.0                  Culture - Tissue with Gram Stain (collected 09 Aug 2023 13:07)  Source: .Tissue RUE  Gram Stain (10 Aug 2023 01:59):    No polymorphonuclear leukocytes seen per low power field    No organisms seen per oil power field    Culture - Blood (collected 08 Aug 2023 12:20)  Source: .Blood None  Preliminary Report (09 Aug 2023 23:01):    No growth at 24 hours          RADIOLOGY:  CXR 8/10/23  Impression:    Bibasilar opacities, unchanged    CTHead 8/9/23  IMPRESSION:  No acute intracranial pathology.      Venous duplex batool LE 8/8/23  IMPRESSION:  No evidence of deep venous thrombosis in either lower extremity.      Venous duplex batool UE pending   24H events:    Patient is a 73y old Male who presents with a chief complaint of Altered Mental Status and Hyperglycemia (10 Aug 2023 08:48)    Primary diagnosis of Acute respiratory failure with hypoxia    Today is hospital day 3d. This morning patient was seen and examined at bedside, resting comfortably in bed, appears confused but redirectable, agitated AO2x (location, self). Reports hallucinations of seeing wife, "", and other hallucinations.    Patient extubated yesterday in AM. Overnight patient seen by Burn and Dermatology. Debridement performed for blister on right upper extremity. Overnight patient was agitated, BP elevated to 180 systolic, received 5mg Hydralazine. No BM overnight. Patient currently NPO. Voiding via lubin catheter.    Code Status:    PAST MEDICAL & SURGICAL HISTORY  DM (diabetes mellitus)  Type 2, 12/27/18 hgb aic  11.2    HTN (hypertension)    Dyslipidemia    Diabetic foot ulcer    Depression    GERD (gastroesophageal reflux disease)    Neuropathy, diabetic    Toe amputation status, right  (2008)    History of amputation of toe  LT -partial 1st toe      SOCIAL HISTORY:  Social History:      ALLERGIES:  No Known Allergies    MEDICATIONS:  STANDING MEDICATIONS  aspirin  chewable 81 milliGRAM(s) Oral daily  chlorhexidine 0.12% Liquid 15 milliLiter(s) Oral Mucosa two times a day  chlorhexidine 2% Cloths 1 Application(s) Topical daily  fondaparinux Injectable 2.5 milliGRAM(s) SubCutaneous daily  insulin glargine Injectable (LANTUS) 21 Unit(s) SubCutaneous every morning  insulin lispro (ADMELOG) corrective regimen sliding scale   SubCutaneous three times a day before meals  insulin lispro Injectable (ADMELOG) 7 Unit(s) SubCutaneous before breakfast  insulin lispro Injectable (ADMELOG) 7 Unit(s) SubCutaneous before lunch  insulin lispro Injectable (ADMELOG) 7 Unit(s) SubCutaneous before dinner  lacosamide IVPB 100 milliGRAM(s) IV Intermittent every 12 hours  levETIRAcetam  IVPB 1000 milliGRAM(s) IV Intermittent every 12 hours  pantoprazole  Injectable 40 milliGRAM(s) IV Push daily  piperacillin/tazobactam IVPB.. 3.375 Gram(s) IV Intermittent every 8 hours  polyethylene glycol 3350 17 Gram(s) Oral two times a day  senna 2 Tablet(s) Oral at bedtime  silver sulfADIAZINE 1% Cream 1 Application(s) Topical two times a day    PRN MEDICATIONS    VITALS:   T(F): 99.6  HR: 86  BP: 163/70  RR: 18  SpO2: 100%    PHYSICAL EXAM:  GENERAL:   ( x ) NAD, lying in bed comfortably     (  ) obtunded     (  ) lethargic     (  ) somnolent    HEAD:   ( x ) Atraumatic     (  ) hematoma     (  ) laceration (specify location:       )     NECK:  ( x ) Supple     (  ) neck stiffness     (  ) nuchal rigidity     ( x )  no JVD     (  ) JVD present ( -- cm)    HEART:  Rate -->     ( x ) normal rate     (  ) bradycardic     (  ) tachycardic  Rhythm -->     ( x ) regular     (  ) regularly irregular     (  ) irregularly irregular  Murmurs -->     ( x ) normal s1s2     (  ) systolic murmur     (  ) diastolic murmur     (  ) continuous murmur      (  ) S3 present     (  ) S4 present    LUNGS:   ( x )Unlabored respirations     (  ) tachypnea  ( x ) B/L air entry     (  ) decreased breath sounds in:  (location     )    ( x ) on 4L nasal cannula  (  ) no adventitious sound     (  ) crackles     (  ) wheezing      (  ) rhonchi      (specify location:       )  (  ) chest wall tenderness (specify location:       )    ABDOMEN:   ( x ) Soft     (  ) tense   |   ( x ) nondistended     (  ) distended   |   ( x ) +BS     (  ) hypoactive bowel sounds     (  ) hyperactive bowel sounds  ( x ) nontender     (  ) RUQ tenderness     (  ) RLQ tenderness     (  ) LLQ tenderness     (  ) epigastric tenderness     (  ) diffuse tenderness  (  ) Splenomegaly      (  ) Hepatomegaly      (  ) Jaundice     (  ) ecchymosis     EXTREMITIES: 2+ peripheral pulses bilaterally. No clubbing, cyanosis, or edema  ( x ) Normal     (  ) Rash     (  ) ecchymosis     (  ) varicose veins      (  ) pitting edema     ( x ) non-pitting edema   (  ) ulceration     (  ) gangrene:     (location: batool upper extremities    )    NERVOUS SYSTEM:    (  ) A&Ox3     ( x ) confused AO2x, redirectable, hyperactive agitation. Sees hallucinations of wife, "", unseen objects such as doors.     (  ) lethargic  CN II-XII:     (  ) Intact     (  ) deficits found     (Specify:     )   Upper extremities:     ( x ) no sensorimotor deficits     (  ) weakness     (  ) loss of proprioception/vibration     (  ) loss of touch/temperature (specify:    )  Lower extremities:     ( x ) no sensorimotor deficits     (  ) weakness     (  ) loss of proprioception/vibration     (  ) loss of touch/temperature (specify:    )    SKIN:   (  ) No rashes or lesions     (  ) maculopapular rash     (  ) pustules     (  ) vesicles     (  ) ulcer     (  ) ecchymosis     (specify location:     )  s/p debridement RUE    AMPAC score:    ( x ) Indwelling Lubin Catheter:   Date insterted:    Reason (  ) Critical illness     (  ) urinary retention    (  ) Accurate Ins/Outs Monitoring     (  ) CMO patient    (  ) Central Line:   Date inserted:  Location: (  ) Right IJ     (  ) Left IJ     (  ) Right Fem     (  ) Left Fem    (  ) SPC        (  ) pigtail       (  ) PEG tube       (  ) colostomy       (  ) jejunostomy  (  ) U-Dall    LABS:                        11.4   11.04 )-----------( 161      ( 10 Aug 2023 05:50 )             34.5     08-10    143  |  106  |  16  ----------------------------<  260<H>  4.1   |  21  |  1.0    Ca    8.9      10 Aug 2023 05:50  Phos  2.7     08-09  Mg     2.2     08-10    TPro  5.6<L>  /  Alb  3.3<L>  /  TBili  1.0  /  DBili  x   /  AST  13  /  ALT  10  /  AlkPhos  93  08-10    PT/INR - ( 09 Aug 2023 11:32 )   PT: 11.20 sec;   INR: 0.98 ratio         PTT - ( 09 Aug 2023 11:32 )  PTT:30.3 sec  Urinalysis Basic - ( 10 Aug 2023 05:50 )    Color: x / Appearance: x / SG: x / pH: x  Gluc: 260 mg/dL / Ketone: x  / Bili: x / Urobili: x   Blood: x / Protein: x / Nitrite: x   Leuk Esterase: x / RBC: x / WBC x   Sq Epi: x / Non Sq Epi: x / Bacteria: x      ABG - ( 09 Aug 2023 10:16 )  pH, Arterial: 7.37  pH, Blood: x     /  pCO2: 43    /  pO2: 92    / HCO3: 25    / Base Excess: -0.6  /  SaO2: 99.0                  Culture - Tissue with Gram Stain (collected 09 Aug 2023 13:07)  Source: .Tissue RUE  Gram Stain (10 Aug 2023 01:59):    No polymorphonuclear leukocytes seen per low power field    No organisms seen per oil power field    Culture - Blood (collected 08 Aug 2023 12:20)  Source: .Blood None  Preliminary Report (09 Aug 2023 23:01):    No growth at 24 hours          RADIOLOGY:  CXR 8/10/23  Impression:    Bibasilar opacities, unchanged    CTHead 8/9/23  IMPRESSION:  No acute intracranial pathology.      Venous duplex batool LE 8/8/23  IMPRESSION:  No evidence of deep venous thrombosis in either lower extremity.      Venous duplex batool UE pending

## 2023-08-11 LAB
ALBUMIN SERPL ELPH-MCNC: 3.5 G/DL — SIGNIFICANT CHANGE UP (ref 3.5–5.2)
ALP SERPL-CCNC: 94 U/L — SIGNIFICANT CHANGE UP (ref 30–115)
ALT FLD-CCNC: 11 U/L — SIGNIFICANT CHANGE UP (ref 0–41)
AMPHET UR-MCNC: NEGATIVE NG/ML — SIGNIFICANT CHANGE UP
ANION GAP SERPL CALC-SCNC: 19 MMOL/L — HIGH (ref 7–14)
AST SERPL-CCNC: 17 U/L — SIGNIFICANT CHANGE UP (ref 0–41)
BARBITURATES UR QL SCN: NEGATIVE NG/ML — SIGNIFICANT CHANGE UP
BARBITURATES UR-MCNC: NEGATIVE NG/ML — SIGNIFICANT CHANGE UP
BASOPHILS # BLD AUTO: 0.04 K/UL — SIGNIFICANT CHANGE UP (ref 0–0.2)
BASOPHILS NFR BLD AUTO: 0.4 % — SIGNIFICANT CHANGE UP (ref 0–1)
BENZODIAZ UR-MCNC: NEGATIVE NG/ML — SIGNIFICANT CHANGE UP
BILIRUB SERPL-MCNC: 1.3 MG/DL — HIGH (ref 0.2–1.2)
BUN SERPL-MCNC: 12 MG/DL — SIGNIFICANT CHANGE UP (ref 10–20)
CALCIUM SERPL-MCNC: 9.4 MG/DL — SIGNIFICANT CHANGE UP (ref 8.4–10.5)
CHLORIDE SERPL-SCNC: 102 MMOL/L — SIGNIFICANT CHANGE UP (ref 98–110)
CO2 SERPL-SCNC: 19 MMOL/L — SIGNIFICANT CHANGE UP (ref 17–32)
COCAINE METAB.OTHER UR-MCNC: NEGATIVE NG/ML — SIGNIFICANT CHANGE UP
CREAT SERPL-MCNC: 0.9 MG/DL — SIGNIFICANT CHANGE UP (ref 0.7–1.5)
CREATININE, URINE THERAPEUTIC: 41 MG/DL — SIGNIFICANT CHANGE UP (ref 20–300)
EGFR: 89 ML/MIN/1.73M2 — SIGNIFICANT CHANGE UP
EOSINOPHIL # BLD AUTO: 0 K/UL — SIGNIFICANT CHANGE UP (ref 0–0.7)
EOSINOPHIL NFR BLD AUTO: 0 % — SIGNIFICANT CHANGE UP (ref 0–8)
FENTANYL RESULT, UR: SIGNIFICANT CHANGE UP NG/ML
FENTANYL UR CFM-MCNC: 41.9 NG/ML — SIGNIFICANT CHANGE UP
FENTANYL UR CFM-MCNC: POSITIVE
FENTANYL UR QL SCN: SIGNIFICANT CHANGE UP NG/ML
FENTANYL+NORFENTANYL UR QL SCN: 38.3 NG/ML — SIGNIFICANT CHANGE UP
FENTANYL+NORFENTANYL UR QL SCN: POSITIVE
FENTANYL/NORFENTANYL RESULT, UR: POSITIVE
GLUCOSE BLDC GLUCOMTR-MCNC: 201 MG/DL — HIGH (ref 70–99)
GLUCOSE BLDC GLUCOMTR-MCNC: 239 MG/DL — HIGH (ref 70–99)
GLUCOSE BLDC GLUCOMTR-MCNC: 268 MG/DL — HIGH (ref 70–99)
GLUCOSE BLDC GLUCOMTR-MCNC: 301 MG/DL — HIGH (ref 70–99)
GLUCOSE BLDC GLUCOMTR-MCNC: 379 MG/DL — HIGH (ref 70–99)
GLUCOSE BLDC GLUCOMTR-MCNC: 463 MG/DL — CRITICAL HIGH (ref 70–99)
GLUCOSE SERPL-MCNC: 315 MG/DL — HIGH (ref 70–99)
HCT VFR BLD CALC: 35.6 % — LOW (ref 42–52)
HGB BLD-MCNC: 12.1 G/DL — LOW (ref 14–18)
IMM GRANULOCYTES NFR BLD AUTO: 2.1 % — HIGH (ref 0.1–0.3)
LYMPHOCYTES # BLD AUTO: 0.37 K/UL — LOW (ref 1.2–3.4)
LYMPHOCYTES # BLD AUTO: 3.9 % — LOW (ref 20.5–51.1)
Lab: SIGNIFICANT CHANGE UP
MAGNESIUM SERPL-MCNC: 1.8 MG/DL — SIGNIFICANT CHANGE UP (ref 1.8–2.4)
MCHC RBC-ENTMCNC: 30.2 PG — SIGNIFICANT CHANGE UP (ref 27–31)
MCHC RBC-ENTMCNC: 34 G/DL — SIGNIFICANT CHANGE UP (ref 32–37)
MCV RBC AUTO: 88.8 FL — SIGNIFICANT CHANGE UP (ref 80–94)
METHADONE UR-MCNC: NEGATIVE NG/ML — SIGNIFICANT CHANGE UP
MONOCYTES # BLD AUTO: 0.74 K/UL — HIGH (ref 0.1–0.6)
MONOCYTES NFR BLD AUTO: 7.9 % — SIGNIFICANT CHANGE UP (ref 1.7–9.3)
NEUTROPHILS # BLD AUTO: 8.02 K/UL — HIGH (ref 1.4–6.5)
NEUTROPHILS NFR BLD AUTO: 85.7 % — HIGH (ref 42.2–75.2)
NORFENTANYL CONFIRMATION, MS RESULT, UR: 38.3 NG/ML — SIGNIFICANT CHANGE UP
NORFENTANYL UR-MCNC: POSITIVE
NRBC # BLD: 0 /100 WBCS — SIGNIFICANT CHANGE UP (ref 0–0)
OPIATES UR-MCNC: NEGATIVE NG/ML — SIGNIFICANT CHANGE UP
OXYCODONE UR QL SCN: NEGATIVE NG/ML — SIGNIFICANT CHANGE UP
PCP UR-MCNC: NEGATIVE NG/ML — SIGNIFICANT CHANGE UP
PH, URINE RESULT: 4.9 — SIGNIFICANT CHANGE UP (ref 4.5–8.9)
PLATELET # BLD AUTO: 189 K/UL — SIGNIFICANT CHANGE UP (ref 130–400)
PMV BLD: 11.1 FL — HIGH (ref 7.4–10.4)
POTASSIUM SERPL-MCNC: 4.2 MMOL/L — SIGNIFICANT CHANGE UP (ref 3.5–5)
POTASSIUM SERPL-SCNC: 4.2 MMOL/L — SIGNIFICANT CHANGE UP (ref 3.5–5)
PROT SERPL-MCNC: 6 G/DL — SIGNIFICANT CHANGE UP (ref 6–8)
RBC # BLD: 4.01 M/UL — LOW (ref 4.7–6.1)
RBC # FLD: 14.1 % — SIGNIFICANT CHANGE UP (ref 11.5–14.5)
SODIUM SERPL-SCNC: 140 MMOL/L — SIGNIFICANT CHANGE UP (ref 135–146)
THC UR QL: NEGATIVE NG/ML — SIGNIFICANT CHANGE UP
WBC # BLD: 9.37 K/UL — SIGNIFICANT CHANGE UP (ref 4.8–10.8)
WBC # FLD AUTO: 9.37 K/UL — SIGNIFICANT CHANGE UP (ref 4.8–10.8)

## 2023-08-11 PROCEDURE — 71045 X-RAY EXAM CHEST 1 VIEW: CPT | Mod: 26

## 2023-08-11 PROCEDURE — 99233 SBSQ HOSP IP/OBS HIGH 50: CPT

## 2023-08-11 RX ORDER — AMLODIPINE BESYLATE 2.5 MG/1
10 TABLET ORAL DAILY
Refills: 0 | Status: DISCONTINUED | OUTPATIENT
Start: 2023-08-11 | End: 2023-08-12

## 2023-08-11 RX ORDER — LABETALOL HCL 100 MG
10 TABLET ORAL ONCE
Refills: 0 | Status: COMPLETED | OUTPATIENT
Start: 2023-08-11 | End: 2023-08-11

## 2023-08-11 RX ORDER — OLANZAPINE 15 MG/1
5 TABLET, FILM COATED ORAL ONCE
Refills: 0 | Status: COMPLETED | OUTPATIENT
Start: 2023-08-11 | End: 2023-08-11

## 2023-08-11 RX ORDER — AMLODIPINE BESYLATE 2.5 MG/1
5 TABLET ORAL ONCE
Refills: 0 | Status: COMPLETED | OUTPATIENT
Start: 2023-08-11 | End: 2023-08-11

## 2023-08-11 RX ORDER — OLANZAPINE 15 MG/1
5 TABLET, FILM COATED ORAL DAILY
Refills: 0 | Status: DISCONTINUED | OUTPATIENT
Start: 2023-08-11 | End: 2023-08-14

## 2023-08-11 RX ORDER — ENOXAPARIN SODIUM 100 MG/ML
40 INJECTION SUBCUTANEOUS EVERY 24 HOURS
Refills: 0 | Status: DISCONTINUED | OUTPATIENT
Start: 2023-08-11 | End: 2023-08-16

## 2023-08-11 RX ADMIN — LOSARTAN POTASSIUM 50 MILLIGRAM(S): 100 TABLET, FILM COATED ORAL at 05:18

## 2023-08-11 RX ADMIN — POLYETHYLENE GLYCOL 3350 17 GRAM(S): 17 POWDER, FOR SOLUTION ORAL at 05:19

## 2023-08-11 RX ADMIN — Medication 7 UNIT(S): at 18:18

## 2023-08-11 RX ADMIN — AMLODIPINE BESYLATE 10 MILLIGRAM(S): 2.5 TABLET ORAL at 05:18

## 2023-08-11 RX ADMIN — AMLODIPINE BESYLATE 5 MILLIGRAM(S): 2.5 TABLET ORAL at 00:34

## 2023-08-11 RX ADMIN — CHLORHEXIDINE GLUCONATE 15 MILLILITER(S): 213 SOLUTION TOPICAL at 18:20

## 2023-08-11 RX ADMIN — PANTOPRAZOLE SODIUM 40 MILLIGRAM(S): 20 TABLET, DELAYED RELEASE ORAL at 13:24

## 2023-08-11 RX ADMIN — FONDAPARINUX SODIUM 2.5 MILLIGRAM(S): 2.5 INJECTION, SOLUTION SUBCUTANEOUS at 13:25

## 2023-08-11 RX ADMIN — LEVETIRACETAM 400 MILLIGRAM(S): 250 TABLET, FILM COATED ORAL at 05:19

## 2023-08-11 RX ADMIN — Medication 6: at 08:26

## 2023-08-11 RX ADMIN — Medication 7 UNIT(S): at 13:24

## 2023-08-11 RX ADMIN — Medication 8: at 13:23

## 2023-08-11 RX ADMIN — POLYETHYLENE GLYCOL 3350 17 GRAM(S): 17 POWDER, FOR SOLUTION ORAL at 18:20

## 2023-08-11 RX ADMIN — Medication 10 MILLIGRAM(S): at 01:31

## 2023-08-11 RX ADMIN — OLANZAPINE 5 MILLIGRAM(S): 15 TABLET, FILM COATED ORAL at 00:34

## 2023-08-11 RX ADMIN — Medication 1 APPLICATION(S): at 05:19

## 2023-08-11 RX ADMIN — LACOSAMIDE 100 MILLIGRAM(S): 50 TABLET ORAL at 05:28

## 2023-08-11 RX ADMIN — INSULIN GLARGINE 21 UNIT(S): 100 INJECTION, SOLUTION SUBCUTANEOUS at 08:33

## 2023-08-11 RX ADMIN — Medication 4: at 17:02

## 2023-08-11 RX ADMIN — PIPERACILLIN AND TAZOBACTAM 25 GRAM(S): 4; .5 INJECTION, POWDER, LYOPHILIZED, FOR SOLUTION INTRAVENOUS at 21:49

## 2023-08-11 RX ADMIN — PIPERACILLIN AND TAZOBACTAM 25 GRAM(S): 4; .5 INJECTION, POWDER, LYOPHILIZED, FOR SOLUTION INTRAVENOUS at 13:31

## 2023-08-11 RX ADMIN — LACOSAMIDE 100 MILLIGRAM(S): 50 TABLET ORAL at 18:04

## 2023-08-11 RX ADMIN — ENOXAPARIN SODIUM 40 MILLIGRAM(S): 100 INJECTION SUBCUTANEOUS at 18:22

## 2023-08-11 RX ADMIN — SENNA PLUS 2 TABLET(S): 8.6 TABLET ORAL at 21:49

## 2023-08-11 RX ADMIN — Medication 1 APPLICATION(S): at 18:23

## 2023-08-11 RX ADMIN — OLANZAPINE 5 MILLIGRAM(S): 15 TABLET, FILM COATED ORAL at 15:12

## 2023-08-11 RX ADMIN — CHLORHEXIDINE GLUCONATE 1 APPLICATION(S): 213 SOLUTION TOPICAL at 13:22

## 2023-08-11 RX ADMIN — Medication 81 MILLIGRAM(S): at 13:23

## 2023-08-11 RX ADMIN — PIPERACILLIN AND TAZOBACTAM 25 GRAM(S): 4; .5 INJECTION, POWDER, LYOPHILIZED, FOR SOLUTION INTRAVENOUS at 05:19

## 2023-08-11 RX ADMIN — LEVETIRACETAM 400 MILLIGRAM(S): 250 TABLET, FILM COATED ORAL at 18:21

## 2023-08-11 RX ADMIN — Medication 7 UNIT(S): at 08:26

## 2023-08-11 NOTE — PROGRESS NOTE ADULT - SUBJECTIVE AND OBJECTIVE BOX
Patient is a 73y old  Male who presents with a chief complaint of Altered Mental Status and Hyperglycemia (10 Aug 2023 08:48)        Over Night Events:  on NC.  Intermittently restless.            ROS:     All ROS are negative except HPI         PHYSICAL EXAM    ICU Vital Signs Last 24 Hrs  T(C): 37 (11 Aug 2023 04:00), Max: 37.6 (10 Aug 2023 08:00)  T(F): 98.6 (11 Aug 2023 04:00), Max: 99.6 (10 Aug 2023 08:00)  HR: 93 (11 Aug 2023 04:00) (73 - 124)  BP: 173/80 (11 Aug 2023 04:00) (143/70 - 210/91)  BP(mean): 101 (11 Aug 2023 02:30) (90 - 145)  ABP: --  ABP(mean): --  RR: 24 (11 Aug 2023 04:00) (17 - 35)  SpO2: 100% (11 Aug 2023 04:00) (95% - 100%)    O2 Parameters below as of 11 Aug 2023 04:00  Patient On (Oxygen Delivery Method): nasal cannula            CONSTITUTIONAL:  In  NAD    ENT:   Airway patent,   Mouth with normal mucosa.   No thrush    EYES:   Pupils equal,   Round and reactive to light.    CARDIAC:   Normal rate,   Regular rhythm.    No edema      RESPIRATORY:   No wheezing  Bilateral BS  Normal chest expansion  Not tachypneic,  No use of accessory muscles    GASTROINTESTINAL:  Abdomen soft,   Non-tender,   No guarding,   + BS    MUSCULOSKELETAL:   Range of motion is not limited,  No clubbing, cyanosis    NEUROLOGICAL:   Alert   Follows simple commands   No motor  deficits.    SKIN:   Skin normal color for race,    No evidence of rash.          08-10-23 @ 07:01  -  08-11-23 @ 07:00  --------------------------------------------------------  IN:    IV PiggyBack: 300 mL  Total IN: 300 mL    OUT:    Indwelling Catheter - Urethral (mL): 170 mL    Voided (mL): 1150 mL  Total OUT: 1320 mL    Total NET: -1020 mL          LABS:                            12.1   9.37  )-----------( 189      ( 11 Aug 2023 06:40 )             35.6                                               08-10    143  |  106  |  16  ----------------------------<  260<H>  4.1   |  21  |  1.0    Ca    8.9      10 Aug 2023 05:50  Mg     2.2     08-10    TPro  5.6<L>  /  Alb  3.3<L>  /  TBili  1.0  /  DBili  x   /  AST  13  /  ALT  10  /  AlkPhos  93  08-10      PT/INR - ( 09 Aug 2023 11:32 )   PT: 11.20 sec;   INR: 0.98 ratio         PTT - ( 09 Aug 2023 11:32 )  PTT:30.3 sec                                       Urinalysis Basic - ( 10 Aug 2023 05:50 )    Color: x / Appearance: x / SG: x / pH: x  Gluc: 260 mg/dL / Ketone: x  / Bili: x / Urobili: x   Blood: x / Protein: x / Nitrite: x   Leuk Esterase: x / RBC: x / WBC x   Sq Epi: x / Non Sq Epi: x / Bacteria: x                                                  LIVER FUNCTIONS - ( 10 Aug 2023 05:50 )  Alb: 3.3 g/dL / Pro: 5.6 g/dL / ALK PHOS: 93 U/L / ALT: 10 U/L / AST: 13 U/L / GGT: x                                                  Culture - Tissue with Gram Stain (collected 09 Aug 2023 13:07)  Source: .Tissue RUE  Gram Stain (10 Aug 2023 01:59):    No polymorphonuclear leukocytes seen per low power field    No organisms seen per oil power field  Preliminary Report (10 Aug 2023 20:23):    No growth to date.    Culture - Blood (collected 09 Aug 2023 05:08)  Source: .Blood None  Preliminary Report (10 Aug 2023 14:02):    No growth at 24 hours    Culture - Blood (collected 08 Aug 2023 12:20)  Source: .Blood None  Preliminary Report (10 Aug 2023 23:01):    No growth at 48 Hours                                                                                       ABG - ( 09 Aug 2023 10:16 )  pH, Arterial: 7.37  pH, Blood: x     /  pCO2: 43    /  pO2: 92    / HCO3: 25    / Base Excess: -0.6  /  SaO2: 99.0                MEDICATIONS  (STANDING):  amLODIPine   Tablet 10 milliGRAM(s) Oral daily  aspirin  chewable 81 milliGRAM(s) Oral daily  chlorhexidine 0.12% Liquid 15 milliLiter(s) Oral Mucosa two times a day  chlorhexidine 2% Cloths 1 Application(s) Topical daily  fondaparinux Injectable 2.5 milliGRAM(s) SubCutaneous daily  insulin glargine Injectable (LANTUS) 21 Unit(s) SubCutaneous every morning  insulin lispro (ADMELOG) corrective regimen sliding scale   SubCutaneous three times a day before meals  insulin lispro Injectable (ADMELOG) 7 Unit(s) SubCutaneous before breakfast  insulin lispro Injectable (ADMELOG) 7 Unit(s) SubCutaneous before lunch  insulin lispro Injectable (ADMELOG) 7 Unit(s) SubCutaneous before dinner  lacosamide IVPB 100 milliGRAM(s) IV Intermittent every 12 hours  levETIRAcetam  IVPB 1000 milliGRAM(s) IV Intermittent every 12 hours  losartan 50 milliGRAM(s) Oral daily  pantoprazole  Injectable 40 milliGRAM(s) IV Push daily  piperacillin/tazobactam IVPB.. 3.375 Gram(s) IV Intermittent every 8 hours  polyethylene glycol 3350 17 Gram(s) Oral two times a day  senna 2 Tablet(s) Oral at bedtime  silver sulfADIAZINE 1% Cream 1 Application(s) Topical two times a day    MEDICATIONS  (PRN):      New X-rays reviewed:                                                                                  ECHO    CXR interpreted by me:       S/P CABG x 3

## 2023-08-11 NOTE — PROGRESS NOTE ADULT - SUBJECTIVE AND OBJECTIVE BOX
JOSÉ MANUEL PORTER  73y Male    CHIEF COMPLAINT:    Patient is a 73y old  Male who presents with a chief complaint of Altered Mental Status and Hyperglycemia (11 Aug 2023 07:41)    INTERVAL HPI/OVERNIGHT EVENTS:    Patient seen and examined. No acute events overnight. Downgraded from MICU, comfortable on NC     ROS: All other systems are negative.    Vital Signs:    T(F): 98.6 (08-11-23 @ 04:00), Max: 99.2 (08-10-23 @ 12:00)  HR: 93 (08-11-23 @ 04:00) (73 - 101)  BP: 173/80 (08-11-23 @ 04:00) (143/70 - 210/91)  RR: 24 (08-11-23 @ 04:00) (17 - 35)  SpO2: 100% (08-11-23 @ 04:00) (95% - 100%)    10 Aug 2023 07:01  -  11 Aug 2023 07:00  --------------------------------------------------------  IN: 300 mL / OUT: 1320 mL / NET: -1020 mL    POCT Blood Glucose.: 268 mg/dL (11 Aug 2023 07:44)  POCT Blood Glucose.: 170 mg/dL (10 Aug 2023 21:42)  POCT Blood Glucose.: 176 mg/dL (10 Aug 2023 17:23)  POCT Blood Glucose.: 242 mg/dL (10 Aug 2023 12:19)    PHYSICAL EXAM:    GENERAL:  NAD  SKIN: No rashes or lesions  HEENT: Atraumatic. Normocephalic.   NECK: Supple, No JVD.   PULMONARY: CTA B/L. No wheezing. No rales  CVS: Normal S1, S2. Rate and Rhythm are regular.    ABDOMEN/GI: Soft, Nontender, Nondistended   MSK:  No clubbing or cyanosis   NEUROLOGIC:  moves all extremities  PSYCH: Awake and alert     Consultant(s) Notes Reviewed:  [x ] YES  [ ] NO  Care Discussed with Consultants/Other Providers [ x] YES  [ ] NO    LABS:                        12.1   9.37  )-----------( 189      ( 11 Aug 2023 06:40 )             35.6     140  |  102  |  12  ----------------------------<  315<H>  4.2   |  19  |  0.9    Ca    9.4      11 Aug 2023 06:40  Mg     1.8     08-11    TPro  6.0  /  Alb  3.5  /  TBili  1.3<H>  /  DBili  x   /  AST  17  /  ALT  11  /  AlkPhos  94  08-11    PT/INR - ( 09 Aug 2023 11:32 )   PT: 11.20 sec;   INR: 0.98 ratio      PTT - ( 09 Aug 2023 11:32 )  PTT:30.3 sec    Culture - Tissue with Gram Stain (collected 09 Aug 2023 13:07)  Source: .Tissue RUE  Gram Stain (10 Aug 2023 01:59):    No polymorphonuclear leukocytes seen per low power field    No organisms seen per oil power field  Preliminary Report (10 Aug 2023 20:23):    No growth to date.    Culture - Blood (collected 09 Aug 2023 05:08)  Source: .Blood None  Preliminary Report (10 Aug 2023 14:02):    No growth at 24 hours    Culture - Blood (collected 08 Aug 2023 12:20)  Source: .Blood None  Preliminary Report (10 Aug 2023 23:01):    No growth at 48 Hours    RADIOLOGY & ADDITIONAL TESTS:  Imaging or report Personally Reviewed:  [x] YES  [ ] NO  EKG reviewed: [x] YES  [ ] NO    Medications:  Standing  amLODIPine   Tablet 10 milliGRAM(s) Oral daily  aspirin  chewable 81 milliGRAM(s) Oral daily  chlorhexidine 0.12% Liquid 15 milliLiter(s) Oral Mucosa two times a day  chlorhexidine 2% Cloths 1 Application(s) Topical daily  fondaparinux Injectable 2.5 milliGRAM(s) SubCutaneous daily  insulin glargine Injectable (LANTUS) 21 Unit(s) SubCutaneous every morning  insulin lispro (ADMELOG) corrective regimen sliding scale   SubCutaneous three times a day before meals  insulin lispro Injectable (ADMELOG) 7 Unit(s) SubCutaneous before breakfast  insulin lispro Injectable (ADMELOG) 7 Unit(s) SubCutaneous before lunch  insulin lispro Injectable (ADMELOG) 7 Unit(s) SubCutaneous before dinner  lacosamide IVPB 100 milliGRAM(s) IV Intermittent every 12 hours  levETIRAcetam  IVPB 1000 milliGRAM(s) IV Intermittent every 12 hours  losartan 50 milliGRAM(s) Oral daily  pantoprazole  Injectable 40 milliGRAM(s) IV Push daily  piperacillin/tazobactam IVPB.. 3.375 Gram(s) IV Intermittent every 8 hours  polyethylene glycol 3350 17 Gram(s) Oral two times a day  senna 2 Tablet(s) Oral at bedtime  silver sulfADIAZINE 1% Cream 1 Application(s) Topical two times a day    PRN Meds             JOSÉ MANUEL PORTER  73y Male    CHIEF COMPLAINT:    Patient is a 73y old  Male who presents with a chief complaint of Altered Mental Status and Hyperglycemia (11 Aug 2023 07:41)    INTERVAL HPI/OVERNIGHT EVENTS:    Patient seen and examined. No acute events overnight. Downgraded from MICU, comfortable on NC     ROS: All other systems are negative.    Vital Signs:    T(F): 98.6 (08-11-23 @ 04:00), Max: 99.2 (08-10-23 @ 12:00)  HR: 93 (08-11-23 @ 04:00) (73 - 101)  BP: 173/80 (08-11-23 @ 04:00) (143/70 - 210/91)  RR: 24 (08-11-23 @ 04:00) (17 - 35)  SpO2: 100% (08-11-23 @ 04:00) (95% - 100%)    10 Aug 2023 07:01  -  11 Aug 2023 07:00  --------------------------------------------------------  IN: 300 mL / OUT: 1320 mL / NET: -1020 mL    POCT Blood Glucose.: 268 mg/dL (11 Aug 2023 07:44)  POCT Blood Glucose.: 170 mg/dL (10 Aug 2023 21:42)  POCT Blood Glucose.: 176 mg/dL (10 Aug 2023 17:23)  POCT Blood Glucose.: 242 mg/dL (10 Aug 2023 12:19)    PHYSICAL EXAM:    GENERAL:  NAD  SKIN: No rashes or lesions  HEENT: Atraumatic. Normocephalic.   NECK: Supple, No JVD.   PULMONARY: CTA B/L. No wheezing. No rales  CVS: Normal S1, S2. Rate and Rhythm are regular.    ABDOMEN/GI: Soft, Nontender, Nondistended   MSK:  No clubbing or cyanosis   NEUROLOGIC:  moves all extremities  PSYCH: AAo x2, intermittently agitated     Consultant(s) Notes Reviewed:  [x ] YES  [ ] NO  Care Discussed with Consultants/Other Providers [ x] YES  [ ] NO    LABS:                        12.1   9.37  )-----------( 189      ( 11 Aug 2023 06:40 )             35.6     140  |  102  |  12  ----------------------------<  315<H>  4.2   |  19  |  0.9    Ca    9.4      11 Aug 2023 06:40  Mg     1.8     08-11    TPro  6.0  /  Alb  3.5  /  TBili  1.3<H>  /  DBili  x   /  AST  17  /  ALT  11  /  AlkPhos  94  08-11    PT/INR - ( 09 Aug 2023 11:32 )   PT: 11.20 sec;   INR: 0.98 ratio      PTT - ( 09 Aug 2023 11:32 )  PTT:30.3 sec    Culture - Tissue with Gram Stain (collected 09 Aug 2023 13:07)  Source: .Tissue RUE  Gram Stain (10 Aug 2023 01:59):    No polymorphonuclear leukocytes seen per low power field    No organisms seen per oil power field  Preliminary Report (10 Aug 2023 20:23):    No growth to date.    Culture - Blood (collected 09 Aug 2023 05:08)  Source: .Blood None  Preliminary Report (10 Aug 2023 14:02):    No growth at 24 hours    Culture - Blood (collected 08 Aug 2023 12:20)  Source: .Blood None  Preliminary Report (10 Aug 2023 23:01):    No growth at 48 Hours    RADIOLOGY & ADDITIONAL TESTS:  Imaging or report Personally Reviewed:  [x] YES  [ ] NO  EKG reviewed: [x] YES  [ ] NO    Medications:  Standing  amLODIPine   Tablet 10 milliGRAM(s) Oral daily  aspirin  chewable 81 milliGRAM(s) Oral daily  chlorhexidine 0.12% Liquid 15 milliLiter(s) Oral Mucosa two times a day  chlorhexidine 2% Cloths 1 Application(s) Topical daily  fondaparinux Injectable 2.5 milliGRAM(s) SubCutaneous daily  insulin glargine Injectable (LANTUS) 21 Unit(s) SubCutaneous every morning  insulin lispro (ADMELOG) corrective regimen sliding scale   SubCutaneous three times a day before meals  insulin lispro Injectable (ADMELOG) 7 Unit(s) SubCutaneous before breakfast  insulin lispro Injectable (ADMELOG) 7 Unit(s) SubCutaneous before lunch  insulin lispro Injectable (ADMELOG) 7 Unit(s) SubCutaneous before dinner  lacosamide IVPB 100 milliGRAM(s) IV Intermittent every 12 hours  levETIRAcetam  IVPB 1000 milliGRAM(s) IV Intermittent every 12 hours  losartan 50 milliGRAM(s) Oral daily  pantoprazole  Injectable 40 milliGRAM(s) IV Push daily  piperacillin/tazobactam IVPB.. 3.375 Gram(s) IV Intermittent every 8 hours  polyethylene glycol 3350 17 Gram(s) Oral two times a day  senna 2 Tablet(s) Oral at bedtime  silver sulfADIAZINE 1% Cream 1 Application(s) Topical two times a day    PRN Meds

## 2023-08-11 NOTE — SWALLOW BEDSIDE ASSESSMENT ADULT - COMMENTS
As per wife, pt was eating regular solids and thin liquids at home. Wife denies any hx of dysphagia. As per wife, pt was eating regular solids and thin liquids at home. Wife denies any hx of dysphagia.  CXR 8/10/23- Bibasilar opacities, unchanged

## 2023-08-11 NOTE — PROGRESS NOTE ADULT - ASSESSMENT
73M PMHx DM, HTN, HLD, h/o seizure disorders, h/o Bipolar Disorder, h/o BPH, GERD and h/o OM s/p multiple toe amputations presents to the ED for altered mental status/Seizure like activity. Patient was found to be severely hyperglycemic, intubated in ED for AMS, and admitted to MICU for DKA/HHS.     Acute Metabolic Encephalopathy - improved  Seizure like activity with a a history of seizure disorder  - s/p extubation, now on NC  - VEEG : Moderate generalized slowing, mild to moderate bifrontal focal slowing with small number of diffusely expressed triphasic waves  - CTH without acute process  - Utox neg   - s/p neurology eval, c/w Keppra 1000 mg bid and Vimpat 100 mg bid    - c/w seizure precautions  - PT/OT    Suspected Aspiration PNA  - clinically improving on NC  - blood cx NGTD, Procal 2.73, wound cx NGTD  - c/w zosyn for 5 day course    DKA/HHS - resolved  History of DM II  - s/p insulin drip  - c/w basal bolus insulin, monitor FS AC and HS, titrate insulin up based on FS     Upper Extremity bullae, likely due to edema and recent IV  - seen by burn, derm, bedside debridement performed on RUE  - wound cx NGTD     HTN - Bp better on Amlodipine 10mg daily and losartan 50mg daily  - if persistently elevated, can increase losartan     Thrombocytopenia - resolved, mayi 117  - DIC, HIT panels  negative  - peripheral smear  - change fondaparinux to prophylactic Lovenox     h/o OM s/p multiple toe amputations  recently admitted for MSSA bacteremia s/p course of antibiotics, to be completed 8/7  recent blood cx NGTD     Basilio removed 8/10, check bladder scan q6H today   Overall prognosis is guarded, monitor in SDU

## 2023-08-11 NOTE — PROGRESS NOTE ADULT - ASSESSMENT
IMPRESSION:    Acute hypoxemic respiratory failure SP extubation   DKA / HHS   Possible aspiration treated   Suspected seizure   Ho seizure disorder  HO bipolar disorder  Recently treated MSSA bacteremia secondary to toe OM  TYLER improving   AMS improving     PLAN:    CNS:  VEEG negative.  Continue Keppra and Vimpat.  FU levels. Repeat CTH negative.  Zyprexa 5 mg daily for now     HEENT: Oral care.      PULMONARY:  HOB @ 45 degrees. Wean FiO2.  Aspiration precaution.      CARDIOVASCULAR: Avoid overload.     GI: GI prophylaxis.  Feeding per speech.  Bowel regimen     RENAL:  Follow up lytes.  Correct as needed.  Bladder scans     INFECTIOUS DISEASE: Follow up cultures, Finish Zosyn course.       HEMATOLOGICAL:  DVT prophylaxis. LE duplex negative.  HIT neg.      ENDOCRINE:  Follow up FS.  Insulin protocol if needed     MUSCULOSKELETAL: bed chair position.      SDU      Prognosis overall poor     Voiding trial

## 2023-08-11 NOTE — PROGRESS NOTE ADULT - ASSESSMENT
73M PMHx DM, HTN, HLD, h/o seizure disorders, h/o Bipolar Disorder, h/o BPH, GERD and h/o OM s/p multiple toe amputations presents to the ED for altered mental status/Seizure like activity. PT severely hyperglycemic in ED and was intubated, admitted to ICU for management of DKA/HHS.      #AMS 2/2 acute metabolic encephalopathy-improved  #Seizure-like activity  - VEEG 8/8 - moderate generalized slowing, mild to moderate bifrontal focal slowing, small number of diffusely expressed triphasic waves, diffusely expressed, more prominently frontopolar, sharply contoured theta  - CTH neg 8/9/23, noted  - c/w keppra and Vimpat-f/u levels  - Utox neg  - bcx, ucx NTD  - RVP negative  -procal noted 2.73 on 8/7/23  - BMP qd  - now on NC, wean O2      # Aspiration PNA  -c/w zosyn on day 4 of 5  -bcx, wound cx NTD  - elevated procal    #DKA/HHS-resolved  #DM2  - s/p insulin drip  - on basal/bolus insulin, fu fs AC/HS, adjust accordingly      #Upper Extremity swelling 2/2 IV edema  - seen by burn, derm, debridement performed on RUE      #HTN  - well managed with amlodipine 10 qd and losartan 50qd  - increase losartan if still hypertensive      #thrombocytopenia - resolved  -platelet count 161<117<146<177<199  - DIC, HIT panels noted, negative  - peripheral smear  - changed fondaparinux to lovenox on 8/11      - bladder scans q6  # DVT ppx: lovenox  # GI ppx: protonix  # Diet: pureed diet with thin liquids and 1:1 feeds per speech and swallow  # Activity: out of bed to chair, increase as tolerated  # Code: full code  # Dispo: SDU

## 2023-08-11 NOTE — PROGRESS NOTE ADULT - SUBJECTIVE AND OBJECTIVE BOX
24H events:    Patient is a 73y old Male who presents with a chief complaint of Altered Mental Status and Hyperglycemia (11 Aug 2023 10:00)    Primary diagnosis of Acute respiratory failure with hypoxia      Today is hospital day 4d. This morning patient was seen and examined at bedside, pt was placed on 1:1 observation for safety as he was frequently attempting to get oob.    Code Status:    Family communication:  Contact date:  Name of person contacted:  Relationship to patient:  Communication details:  What matters most:    PAST MEDICAL & SURGICAL HISTORY  DM (diabetes mellitus)  Type 2, 12/27/18 hgb aic  11.2    HTN (hypertension)    Dyslipidemia    Diabetic foot ulcer    Depression    GERD (gastroesophageal reflux disease)    Neuropathy, diabetic    Toe amputation status, right  (2008)    History of amputation of toe  LT -partial 1st toe      SOCIAL HISTORY:  Social History:      ALLERGIES:  No Known Allergies    MEDICATIONS:  STANDING MEDICATIONS  amLODIPine   Tablet 10 milliGRAM(s) Oral daily  aspirin  chewable 81 milliGRAM(s) Oral daily  chlorhexidine 0.12% Liquid 15 milliLiter(s) Oral Mucosa two times a day  chlorhexidine 2% Cloths 1 Application(s) Topical daily  fondaparinux Injectable 2.5 milliGRAM(s) SubCutaneous daily  insulin glargine Injectable (LANTUS) 21 Unit(s) SubCutaneous every morning  insulin lispro (ADMELOG) corrective regimen sliding scale   SubCutaneous three times a day before meals  insulin lispro Injectable (ADMELOG) 7 Unit(s) SubCutaneous before breakfast  insulin lispro Injectable (ADMELOG) 7 Unit(s) SubCutaneous before lunch  insulin lispro Injectable (ADMELOG) 7 Unit(s) SubCutaneous before dinner  lacosamide IVPB 100 milliGRAM(s) IV Intermittent every 12 hours  levETIRAcetam  IVPB 1000 milliGRAM(s) IV Intermittent every 12 hours  losartan 50 milliGRAM(s) Oral daily  OLANZapine 5 milliGRAM(s) Oral daily  pantoprazole  Injectable 40 milliGRAM(s) IV Push daily  piperacillin/tazobactam IVPB.. 3.375 Gram(s) IV Intermittent every 8 hours  polyethylene glycol 3350 17 Gram(s) Oral two times a day  senna 2 Tablet(s) Oral at bedtime  silver sulfADIAZINE 1% Cream 1 Application(s) Topical two times a day    PRN MEDICATIONS    VITALS:   T(F): 97.1  HR: 80  BP: 164/70  RR: 20  SpO2: 100%    PHYSICAL EXAM:  GENERAL: attempting to get oob  (  ) NAD, lying in bed comfortably     (  ) obtunded     (  ) lethargic     (  ) somnolent    HEAD:   ( x ) Atraumatic     (  ) hematoma     (  ) laceration (specify location:       )     NECK:  (  ) Supple     (  ) neck stiffness     (  ) nuchal rigidity     (  )  no JVD     (  ) JVD present ( -- cm)    HEART:  Rate -->     ( x ) normal rate     (  ) bradycardic     (  ) tachycardic  Rhythm -->     (  ) regular     (  ) regularly irregular     (  ) irregularly irregular  Murmurs -->     (  ) normal s1s2     (  ) systolic murmur     (  ) diastolic murmur     (  ) continuous murmur      (  ) S3 present     (  ) S4 present    LUNGS:   (  )Unlabored respirations     (  ) tachypnea  (  ) B/L air entry     (  ) decreased breath sounds in:  (location     )    (  ) no adventitious sound     (  ) crackles     (  ) wheezing      (  ) rhonchi      (specify location:       )  (  ) chest wall tenderness (specify location:       )    ABDOMEN:   ( x ) Soft     (  ) tense   |   (  ) nondistended     (  ) distended   |   (  ) +BS     (  ) hypoactive bowel sounds     (  ) hyperactive bowel sounds  (  ) nontender     (  ) RUQ tenderness     (  ) RLQ tenderness     (  ) LLQ tenderness     (  ) epigastric tenderness     (  ) diffuse tenderness  (  ) Splenomegaly      (  ) Hepatomegaly      (  ) Jaundice     (  ) ecchymosis     EXTREMITIES:  (  ) Normal     (  ) Rash     (  ) ecchymosis     (  ) varicose veins      (  ) pitting edema     (  ) non-pitting edema   (  ) ulceration     (  ) gangrene:     (location:     )    NERVOUS SYSTEM:    (  ) A&Ox3     (  ) confused     (  ) lethargic  CN II-XII:     (  ) Intact     (  ) deficits found     (Specify:     )   Upper extremities:     (  ) no sensorimotor deficits     (  ) weakness     (  ) loss of proprioception/vibration     (  ) loss of touch/temperature (specify:    )  Lower extremities:     (  ) no sensorimotor deficits     (  ) weakness     (  ) loss of proprioception/vibration     (  ) loss of touch/temperature (specify:    )    SKIN:   (  ) No rashes or lesions     (  ) maculopapular rash     (  ) pustules     (  ) vesicles     (  ) ulcer     (  ) ecchymosis     (specify location:     )    AMPAC score:    (  ) Indwelling Basilio Catheter:   Date insterted:    Reason (  ) Critical illness     (  ) urinary retention    (  ) Accurate Ins/Outs Monitoring     (  ) CMO patient    (  ) Central Line:   Date inserted:  Location: (  ) Right IJ     (  ) Left IJ     (  ) Right Fem     (  ) Left Fem    (  ) SPC        (  ) pigtail       (  ) PEG tube       (  ) colostomy       (  ) jejunostomy  (  ) U-Dall    LABS:                        12.1   9.37  )-----------( 189      ( 11 Aug 2023 06:40 )             35.6     08-11    140  |  102  |  12  ----------------------------<  315<H>  4.2   |  19  |  0.9    Ca    9.4      11 Aug 2023 06:40  Mg     1.8     08-11    TPro  6.0  /  Alb  3.5  /  TBili  1.3<H>  /  DBili  x   /  AST  17  /  ALT  11  /  AlkPhos  94  08-11      Urinalysis Basic - ( 11 Aug 2023 06:40 )    Color: x / Appearance: x / SG: x / pH: x  Gluc: 315 mg/dL / Ketone: x  / Bili: x / Urobili: x   Blood: x / Protein: x / Nitrite: x   Leuk Esterase: x / RBC: x / WBC x   Sq Epi: x / Non Sq Epi: x / Bacteria: x            Culture - Tissue with Gram Stain (collected 09 Aug 2023 13:07)  Source: .Tissue RUE  Gram Stain (10 Aug 2023 01:59):    No polymorphonuclear leukocytes seen per low power field    No organisms seen per oil power field  Preliminary Report (10 Aug 2023 20:23):    No growth to date.    Culture - Blood (collected 09 Aug 2023 05:08)  Source: .Blood None  Preliminary Report (11 Aug 2023 14:01):    No growth at 48 Hours          RADIOLOGY:

## 2023-08-12 LAB
ALBUMIN SERPL ELPH-MCNC: 3.3 G/DL — LOW (ref 3.5–5.2)
ALFENTANIL UR QUANT: SIGNIFICANT CHANGE UP NG/MG CREAT
ALP SERPL-CCNC: 85 U/L — SIGNIFICANT CHANGE UP (ref 30–115)
ALT FLD-CCNC: 13 U/L — SIGNIFICANT CHANGE UP (ref 0–41)
ANION GAP SERPL CALC-SCNC: 12 MMOL/L — SIGNIFICANT CHANGE UP (ref 7–14)
AST SERPL-CCNC: 18 U/L — SIGNIFICANT CHANGE UP (ref 0–41)
BASOPHILS # BLD AUTO: 0.04 K/UL — SIGNIFICANT CHANGE UP (ref 0–0.2)
BASOPHILS NFR BLD AUTO: 0.6 % — SIGNIFICANT CHANGE UP (ref 0–1)
BILIRUB SERPL-MCNC: 0.9 MG/DL — SIGNIFICANT CHANGE UP (ref 0.2–1.2)
BUN SERPL-MCNC: 12 MG/DL — SIGNIFICANT CHANGE UP (ref 10–20)
CALCIUM SERPL-MCNC: 8.9 MG/DL — SIGNIFICANT CHANGE UP (ref 8.4–10.4)
CHLORIDE SERPL-SCNC: 105 MMOL/L — SIGNIFICANT CHANGE UP (ref 98–110)
CO2 SERPL-SCNC: 24 MMOL/L — SIGNIFICANT CHANGE UP (ref 17–32)
CREAT SERPL-MCNC: 1.1 MG/DL — SIGNIFICANT CHANGE UP (ref 0.7–1.5)
CREAT UR-MCNC: 40 MG/DL — SIGNIFICANT CHANGE UP
CULTURE RESULTS: SIGNIFICANT CHANGE UP
CULTURE RESULTS: SIGNIFICANT CHANGE UP
EGFR: 70 ML/MIN/1.73M2 — SIGNIFICANT CHANGE UP
EOSINOPHIL # BLD AUTO: 0.03 K/UL — SIGNIFICANT CHANGE UP (ref 0–0.7)
EOSINOPHIL NFR BLD AUTO: 0.4 % — SIGNIFICANT CHANGE UP (ref 0–8)
GLUCOSE BLDC GLUCOMTR-MCNC: 189 MG/DL — HIGH (ref 70–99)
GLUCOSE BLDC GLUCOMTR-MCNC: 211 MG/DL — HIGH (ref 70–99)
GLUCOSE BLDC GLUCOMTR-MCNC: 264 MG/DL — HIGH (ref 70–99)
GLUCOSE BLDC GLUCOMTR-MCNC: 308 MG/DL — HIGH (ref 70–99)
GLUCOSE SERPL-MCNC: 321 MG/DL — HIGH (ref 70–99)
HCT VFR BLD CALC: 34.4 % — LOW (ref 42–52)
HGB BLD-MCNC: 11.3 G/DL — LOW (ref 14–18)
IMM GRANULOCYTES NFR BLD AUTO: 3.4 % — HIGH (ref 0.1–0.3)
LYMPHOCYTES # BLD AUTO: 0.54 K/UL — LOW (ref 1.2–3.4)
LYMPHOCYTES # BLD AUTO: 7.7 % — LOW (ref 20.5–51.1)
MAGNESIUM SERPL-MCNC: 2 MG/DL — SIGNIFICANT CHANGE UP (ref 1.8–2.4)
MCHC RBC-ENTMCNC: 29.6 PG — SIGNIFICANT CHANGE UP (ref 27–31)
MCHC RBC-ENTMCNC: 32.8 G/DL — SIGNIFICANT CHANGE UP (ref 32–37)
MCV RBC AUTO: 90.1 FL — SIGNIFICANT CHANGE UP (ref 80–94)
MONOCYTES # BLD AUTO: 0.69 K/UL — HIGH (ref 0.1–0.6)
MONOCYTES NFR BLD AUTO: 9.9 % — HIGH (ref 1.7–9.3)
NEUTROPHILS # BLD AUTO: 5.44 K/UL — SIGNIFICANT CHANGE UP (ref 1.4–6.5)
NEUTROPHILS NFR BLD AUTO: 78 % — HIGH (ref 42.2–75.2)
NRBC # BLD: 0 /100 WBCS — SIGNIFICANT CHANGE UP (ref 0–0)
PLATELET # BLD AUTO: 183 K/UL — SIGNIFICANT CHANGE UP (ref 130–400)
PMV BLD: 11.2 FL — HIGH (ref 7.4–10.4)
POTASSIUM SERPL-MCNC: 4.4 MMOL/L — SIGNIFICANT CHANGE UP (ref 3.5–5)
POTASSIUM SERPL-SCNC: 4.4 MMOL/L — SIGNIFICANT CHANGE UP (ref 3.5–5)
PROT SERPL-MCNC: 5.7 G/DL — LOW (ref 6–8)
RBC # BLD: 3.82 M/UL — LOW (ref 4.7–6.1)
RBC # FLD: 14 % — SIGNIFICANT CHANGE UP (ref 11.5–14.5)
SODIUM SERPL-SCNC: 141 MMOL/L — SIGNIFICANT CHANGE UP (ref 135–146)
SPECIMEN SOURCE: SIGNIFICANT CHANGE UP
SPECIMEN SOURCE: SIGNIFICANT CHANGE UP
SUFENTANIL UR QUANT: SIGNIFICANT CHANGE UP NG/MG CREAT
WBC # BLD: 6.98 K/UL — SIGNIFICANT CHANGE UP (ref 4.8–10.8)
WBC # FLD AUTO: 6.98 K/UL — SIGNIFICANT CHANGE UP (ref 4.8–10.8)

## 2023-08-12 PROCEDURE — 99233 SBSQ HOSP IP/OBS HIGH 50: CPT

## 2023-08-12 RX ORDER — INSULIN LISPRO 100/ML
10 VIAL (ML) SUBCUTANEOUS
Refills: 0 | Status: DISCONTINUED | OUTPATIENT
Start: 2023-08-12 | End: 2023-08-15

## 2023-08-12 RX ORDER — INSULIN GLARGINE 100 [IU]/ML
25 INJECTION, SOLUTION SUBCUTANEOUS EVERY MORNING
Refills: 0 | Status: DISCONTINUED | OUTPATIENT
Start: 2023-08-12 | End: 2023-08-16

## 2023-08-12 RX ORDER — NIFEDIPINE 30 MG
60 TABLET, EXTENDED RELEASE 24 HR ORAL DAILY
Refills: 0 | Status: DISCONTINUED | OUTPATIENT
Start: 2023-08-12 | End: 2023-08-16

## 2023-08-12 RX ADMIN — INSULIN GLARGINE 25 UNIT(S): 100 INJECTION, SOLUTION SUBCUTANEOUS at 09:43

## 2023-08-12 RX ADMIN — ENOXAPARIN SODIUM 40 MILLIGRAM(S): 100 INJECTION SUBCUTANEOUS at 17:42

## 2023-08-12 RX ADMIN — LACOSAMIDE 100 MILLIGRAM(S): 50 TABLET ORAL at 05:24

## 2023-08-12 RX ADMIN — Medication 4: at 11:45

## 2023-08-12 RX ADMIN — LEVETIRACETAM 400 MILLIGRAM(S): 250 TABLET, FILM COATED ORAL at 05:59

## 2023-08-12 RX ADMIN — Medication 1 APPLICATION(S): at 05:59

## 2023-08-12 RX ADMIN — LOSARTAN POTASSIUM 50 MILLIGRAM(S): 100 TABLET, FILM COATED ORAL at 05:26

## 2023-08-12 RX ADMIN — AMLODIPINE BESYLATE 10 MILLIGRAM(S): 2.5 TABLET ORAL at 05:25

## 2023-08-12 RX ADMIN — Medication 8: at 08:27

## 2023-08-12 RX ADMIN — Medication 10 UNIT(S): at 17:35

## 2023-08-12 RX ADMIN — LEVETIRACETAM 400 MILLIGRAM(S): 250 TABLET, FILM COATED ORAL at 17:37

## 2023-08-12 RX ADMIN — Medication 10 UNIT(S): at 11:45

## 2023-08-12 RX ADMIN — CHLORHEXIDINE GLUCONATE 1 APPLICATION(S): 213 SOLUTION TOPICAL at 11:46

## 2023-08-12 RX ADMIN — PIPERACILLIN AND TAZOBACTAM 25 GRAM(S): 4; .5 INJECTION, POWDER, LYOPHILIZED, FOR SOLUTION INTRAVENOUS at 15:58

## 2023-08-12 RX ADMIN — PANTOPRAZOLE SODIUM 40 MILLIGRAM(S): 20 TABLET, DELAYED RELEASE ORAL at 11:47

## 2023-08-12 RX ADMIN — PIPERACILLIN AND TAZOBACTAM 25 GRAM(S): 4; .5 INJECTION, POWDER, LYOPHILIZED, FOR SOLUTION INTRAVENOUS at 05:27

## 2023-08-12 RX ADMIN — Medication 10 UNIT(S): at 08:27

## 2023-08-12 RX ADMIN — POLYETHYLENE GLYCOL 3350 17 GRAM(S): 17 POWDER, FOR SOLUTION ORAL at 17:36

## 2023-08-12 RX ADMIN — Medication 60 MILLIGRAM(S): at 11:49

## 2023-08-12 RX ADMIN — CHLORHEXIDINE GLUCONATE 15 MILLILITER(S): 213 SOLUTION TOPICAL at 17:36

## 2023-08-12 RX ADMIN — PIPERACILLIN AND TAZOBACTAM 25 GRAM(S): 4; .5 INJECTION, POWDER, LYOPHILIZED, FOR SOLUTION INTRAVENOUS at 22:58

## 2023-08-12 RX ADMIN — Medication 1 APPLICATION(S): at 17:40

## 2023-08-12 RX ADMIN — OLANZAPINE 5 MILLIGRAM(S): 15 TABLET, FILM COATED ORAL at 11:47

## 2023-08-12 RX ADMIN — CHLORHEXIDINE GLUCONATE 15 MILLILITER(S): 213 SOLUTION TOPICAL at 05:26

## 2023-08-12 RX ADMIN — Medication 2: at 17:35

## 2023-08-12 RX ADMIN — POLYETHYLENE GLYCOL 3350 17 GRAM(S): 17 POWDER, FOR SOLUTION ORAL at 05:26

## 2023-08-12 RX ADMIN — Medication 81 MILLIGRAM(S): at 11:46

## 2023-08-12 RX ADMIN — LACOSAMIDE 100 MILLIGRAM(S): 50 TABLET ORAL at 17:36

## 2023-08-12 NOTE — PROGRESS NOTE ADULT - SUBJECTIVE AND OBJECTIVE BOX
JOSÉ MANUEL PORTER  73y Male    CHIEF COMPLAINT:    Patient is a 73y old  Male who presents with a chief complaint of Altered Mental Status and Hyperglycemia (12 Aug 2023 09:28)    INTERVAL HPI/OVERNIGHT EVENTS:    Patient seen and examined. No acute events overnight. Intermittently agitated and confused, on 2l nc    ROS: All other systems are negative.    Vital Signs:    T(F): 98 (08-12-23 @ 08:00), Max: 98 (08-11-23 @ 20:00)  HR: 81 (08-12-23 @ 08:00) (80 - 103)  BP: 175/81 (08-12-23 @ 08:00) (147/67 - 215/98)  RR: 19 (08-12-23 @ 08:00) (18 - 20)  SpO2: 99% (08-12-23 @ 08:00) (96% - 100%)    POCT Blood Glucose.: 211 mg/dL (12 Aug 2023 11:15)  POCT Blood Glucose.: 308 mg/dL (12 Aug 2023 07:33)  POCT Blood Glucose.: 239 mg/dL (11 Aug 2023 23:42)  POCT Blood Glucose.: 463 mg/dL (11 Aug 2023 21:33)  POCT Blood Glucose.: 201 mg/dL (11 Aug 2023 16:13)  POCT Blood Glucose.: 301 mg/dL (11 Aug 2023 12:43)    PHYSICAL EXAM:    GENERAL:  NAD  SKIN: No rashes or lesions  HEENT: Atraumatic. Normocephalic.   NECK: Supple, No JVD.   PULMONARY: CTA B/L. No wheezing. No rales  CVS: Normal S1, S2. Rate and Rhythm are regular  ABDOMEN/GI: Soft, Nontender, Nondistended  MSK:  no clubbing or cyanosis, multiple toes amputated  NEUROLOGIC:  moves all extremities  PSYCH: Alert & oriented x 1    Consultant(s) Notes Reviewed:  [x ] YES  [ ] NO  Care Discussed with Consultants/Other Providers [ x] YES  [ ] NO    LABS:                        11.3   6.98  )-----------( 183      ( 12 Aug 2023 06:51 )             34.4     141  |  105  |  12  ----------------------------<  321<H>  4.4   |  24  |  1.1    Ca    8.9      12 Aug 2023 06:51  Mg     2.0     08-12    TPro  5.7<L>  /  Alb  3.3<L>  /  TBili  0.9  /  DBili  x   /  AST  18  /  ALT  13  /  AlkPhos  85  08-12    Culture - Tissue with Gram Stain (collected 09 Aug 2023 13:07)  Source: .Tissue RUE  Gram Stain (10 Aug 2023 01:59):    No polymorphonuclear leukocytes seen per low power field    No organisms seen per oil power field  Preliminary Report (10 Aug 2023 20:23):    No growth to date.    RADIOLOGY & ADDITIONAL TESTS:  Imaging or report Personally Reviewed:  [x] YES  [ ] NO  EKG reviewed: [x] YES  [ ] NO    Medications:  Standing  aspirin  chewable 81 milliGRAM(s) Oral daily  chlorhexidine 0.12% Liquid 15 milliLiter(s) Oral Mucosa two times a day  chlorhexidine 2% Cloths 1 Application(s) Topical daily  enoxaparin Injectable 40 milliGRAM(s) SubCutaneous every 24 hours  insulin glargine Injectable (LANTUS) 25 Unit(s) SubCutaneous every morning  insulin lispro (ADMELOG) corrective regimen sliding scale   SubCutaneous three times a day before meals  insulin lispro Injectable (ADMELOG) 10 Unit(s) SubCutaneous before breakfast  insulin lispro Injectable (ADMELOG) 10 Unit(s) SubCutaneous before lunch  insulin lispro Injectable (ADMELOG) 10 Unit(s) SubCutaneous before dinner  lacosamide IVPB 100 milliGRAM(s) IV Intermittent every 12 hours  levETIRAcetam  IVPB 1000 milliGRAM(s) IV Intermittent every 12 hours  losartan 50 milliGRAM(s) Oral daily  NIFEdipine XL 60 milliGRAM(s) Oral daily  OLANZapine 5 milliGRAM(s) Oral daily  pantoprazole  Injectable 40 milliGRAM(s) IV Push daily  piperacillin/tazobactam IVPB.. 3.375 Gram(s) IV Intermittent every 8 hours  polyethylene glycol 3350 17 Gram(s) Oral two times a day  senna 2 Tablet(s) Oral at bedtime  silver sulfADIAZINE 1% Cream 1 Application(s) Topical two times a day    PRN Meds

## 2023-08-12 NOTE — PROGRESS NOTE ADULT - ASSESSMENT
IMPRESSION:    Acute hypoxemic respiratory failure SP extubation   DKA / HHS - resolved  Possible aspiration treated   Suspected seizure   Ho seizure disorder  HO bipolar disorder  Recently treated MSSA bacteremia secondary to toe OM  TYLER improving   AMS improving     PLAN:    CNS:  VEEG negative.  Continue Keppra and Vimpat.  FU levels. Repeat CTH negative.  Zyprexa 5 mg daily for now     HEENT: Oral care.      PULMONARY:  HOB @ 45 degrees. Wean FiO2.  Aspiration precaution.      CARDIOVASCULAR: Avoid overload. BP control     GI: GI prophylaxis.  Feeding per speech.  Bowel regimen     RENAL:  Follow up lytes.  Correct as needed.  Bladder scans     INFECTIOUS DISEASE: Follow up cultures, Finish Zosyn course.     HEMATOLOGICAL:  DVT prophylaxis. LE duplex negative.  HIT neg.      ENDOCRINE:  Follow up FS.  Insulin protocol if needed     MUSCULOSKELETAL: bed chair position.      DGTF  Prognosis overall poor   Voiding trial  IMPRESSION:    Acute hypoxemic respiratory failure SP extubation   DKA / HHS - resolved  Possible aspiration treated   Suspected seizure   Ho seizure disorder  HO bipolar disorder  Recently treated MSSA bacteremia secondary to toe OM  TYLER improving   AMS improving     PLAN:    CNS:  VEEG negative.  Continue Keppra and Vimpat.  FU levels. Repeat CTH negative.  Zyprexa 5 mg daily for now     HEENT: Oral care.      PULMONARY:  HOB @ 45 degrees. Wean FiO2.  Aspiration precaution.      CARDIOVASCULAR: Avoid overload. BP control. QTc. Nifedipine XL.     GI: GI prophylaxis.  Feeding per speech.  Bowel regimen     RENAL:  Follow up lytes.  Correct as needed.  Bladder scans     INFECTIOUS DISEASE: CX negative. 5 day abx course.     HEMATOLOGICAL:  DVT prophylaxis. LE duplex negative.  HIT neg.      ENDOCRINE:  Follow up FS.  Insulin protocol if needed     MUSCULOSKELETAL: bed chair position.      DGTF    Voiding trial

## 2023-08-12 NOTE — CHART NOTE - NSCHARTNOTEFT_GEN_A_CORE
SDU Transfer Note    Transfer from: SDU    Transfer to: ( x ) Medicine       HPI:  73M PMHx DM, HTN, HLD, h/o seizure disorders, h/o Bipolar Disorder, h/o BPH, GERD and h/o OM s/p multiple toe amputations presents to the ED for altered mental status. As per wife, patient had a witnessed seizure-like activity at home that lasted for several minutes. EMS was called and found patient to be severely hyperglycemic. Patient was arousable while en route to the hospital. Wife reports that patient has not been acting himself for the last 2 weeks. States that he has a drug problem and takes medications for his chronic pain. He has also been hiding his medications and she is unsure if he has been taking any of them. As per patient's wife, his medications should be the same since last admission except for one possible change, but she is unsure of which one.    Admitted to MICU s/p intubation    MICU COURSE:  Patient receieved in MICU intubated on MV. Initially sedated on fentanyl and presedex, failed initial SBT trials and had been agitated/not following commands. Patient had not required pressors throughout ICU course. Patient would have multiple BP spikes to 180mmHg systolic requiring hydralazine push. In ICU platelet count downtrended to 117, heparin d/c'ed and started on fondaparinux, HIT and DIC panel negative, platelet counted uptrended back to 160s. Patient experienced upper extremity nonpitting edema, debridement of blister in RUE duplex positive for superficial embolism. CT Head twice performed, negative for acute pathology. Patient had been successfully extubated, though remained confused and intermittently AO 2x/AO1x requiring redirection to deescalate agitation.      CEU:  Pt was stable on NC in SDU. On  pt repeatedly tried to get out of his bed and was placed on 1:1 observation for safety otherwise pt is A&O x1 at baseline. Fondaparinux was changed to lovenox and insulin regimen was adjusted due to increasing fs. Pt stable for downgrade to floor.      Follow-up:  - f/u keppra and Vimpat  - wean oxygen requirements          Vital Signs Last 24 Hrs  T(C): 36.7 (12 Aug 2023 17:13), Max: 36.7 (11 Aug 2023 20:00)  T(F): 98.1 (12 Aug 2023 17:13), Max: 98.1 (12 Aug 2023 17:13)  HR: 89 (12 Aug 2023 17:13) (81 - 103)  BP: 157/70 (12 Aug 2023 17:13) (147/67 - 215/98)  BP(mean): --  RR: 18 (12 Aug 2023 17:13) (18 - 20)  SpO2: 98% (12 Aug 2023 17:) (96% - 100%)    Parameters below as of 12 Aug 2023 17:13  Patient On (Oxygen Delivery Method): nasal cannula        I&O's Summary      Physical Exam:       LABS:                               11.3   6.98  )-----------( 183      ( 12 Aug 2023 06:51 )             34.4       08-12    141  |  105  |  12  ----------------------------<  321<H>  4.4   |  24  |  1.1    Ca    8.9      12 Aug 2023 06:51  Mg     2.0     12    TPro  5.7<L>  /  Alb  3.3<L>  /  TBili  0.9  /  DBili  x   /  AST  18  /  ALT  13  /  AlkPhos  85  08-12                EC Lead ECG:   Ventricular Rate 100 BPM    Atrial Rate 100 BPM    P-R Interval 96 ms    QRS Duration 62 ms    Q-T Interval 518 ms    QTC Calculation(Bazett) 668 ms    P Axis 42 degrees    R Axis 8 degrees    T Axis 49 degrees    Diagnosis Line Sinus rhythm with short WI with occasional Premature ventricular complexes  Nonspecific T wave abnormality  Prolonged QT  Abnormal ECG    Confirmed by Misha Albarado ([id number]) on 2023 2:45:56 PM (23 @ 16:44)    Telemetry:    Imaging:      ASSESSMENT & PLAN:   73M PMHx DM, HTN, HLD, h/o seizure disorders, h/o Bipolar Disorder, h/o BPH, GERD and h/o OM s/p multiple toe amputations presents to the ED for altered mental status/Seizure like activity. PT severely hyperglycemic in ED and was intubated, admitted to ICU for management of DKA/HHS.      #AMS 2/2 acute metabolic encephalopathy-improved  #Seizure-like activity  - VEEG  - moderate generalized slowing, mild to moderate bifrontal focal slowing, small number of diffusely expressed triphasic waves, diffusely expressed, more prominently frontopolar, sharply contoured theta  - CTH neg 23, noted  - c/w keppra and Vimpat-f/u levels  - Utox neg  - bcx, ucx NTD  - RVP negative  -procal noted 2.73 on 23  - BMP qd  - now on NC, wean O2      # Aspiration PNA  - on NC  - complete 5 day course of zosyn  - bcx, wound cx NTD      #DKA/HHS-resolved  #DM2  - s/p insulin drip  - on basal/bolus insulin, fu fs AC/HS, adjust accordingly      #Upper Extremity swelling 2/2 IV edema  - seen by burn, derm, debridement performed on RUE for IV infiltration      #HTN  - on losartan 50qd  - stopped amlodipine 10 qd and added nifedipine 60 QD      #thrombocytopenia - resolved  - DIC, HIT panels noted, negative  - changed fondaparinux to lovenox on       - bladder scans q6  # DVT ppx: lovenox  # GI ppx: protonix  # Diet: pureed diet with thin liquids and 1:1 feeds per speech and swallow  # Activity: out of bed to chair, increase as tolerated  # Code: full code  # Dispo: Floor

## 2023-08-12 NOTE — PROGRESS NOTE ADULT - SUBJECTIVE AND OBJECTIVE BOX
Patient is a 73y old  Male who presents with a chief complaint of Altered Mental Status and Hyperglycemia (11 Aug 2023 16:22)        Over Night Events:  2 L NC.      ROS:     All ROS are negative except HPI         PHYSICAL EXAM    ICU Vital Signs Last 24 Hrs  T(C): 36.7 (12 Aug 2023 08:00), Max: 36.7 (11 Aug 2023 11:00)  T(F): 98 (12 Aug 2023 08:00), Max: 98.1 (11 Aug 2023 11:00)  HR: 81 (12 Aug 2023 08:00) (80 - 103)  BP: 175/81 (12 Aug 2023 08:00) (147/67 - 215/98)  BP(mean): 100 (11 Aug 2023 11:00) (100 - 100)  RR: 19 (12 Aug 2023 08:00) (18 - 20)  SpO2: 99% (12 Aug 2023 08:00) (95% - 100%)    O2 Parameters below as of 11 Aug 2023 16:00  Patient On (Oxygen Delivery Method): nasal cannula        ENT: Airway patent,  EYES: Pupils equal, Round and reactive to light.  CARDIAC: Normal rate, Regular rhythm.    RESPIRATORY: No wheezing, Bilateral BS, Not tachypneic, No use of accessory muscles  GASTROINTESTINAL: Abdomen soft, Non-tender, No guarding,   NEUROLOGICAL: Alert and oriented   SKIN: Skin normal color for race, Warm and dry and intact.         LABS:                            11.3   6.98  )-----------( 183      ( 12 Aug 2023 06:51 )             34.4                                               08-12    141  |  105  |  12  ----------------------------<  321<H>  4.4   |  24  |  1.1    Ca    8.9      12 Aug 2023 06:51  Mg     2.0     08-12    TPro  5.7<L>  /  Alb  3.3<L>  /  TBili  0.9  /  DBili  x   /  AST  18  /  ALT  13  /  AlkPhos  85  08-12                                             Urinalysis Basic - ( 12 Aug 2023 06:51 )    Color: x / Appearance: x / SG: x / pH: x  Gluc: 321 mg/dL / Ketone: x  / Bili: x / Urobili: x   Blood: x / Protein: x / Nitrite: x   Leuk Esterase: x / RBC: x / WBC x   Sq Epi: x / Non Sq Epi: x / Bacteria: x                                              LIVER FUNCTIONS - ( 12 Aug 2023 06:51 )  Alb: 3.3 g/dL / Pro: 5.7 g/dL / ALK PHOS: 85 U/L / ALT: 13 U/L / AST: 18 U/L / GGT: x                                                Culture - Tissue with Gram Stain (collected 09 Aug 2023 13:07)  Source: .Tissue RUE  Gram Stain (10 Aug 2023 01:59):    No polymorphonuclear leukocytes seen per low power field    No organisms seen per oil power field  Preliminary Report (10 Aug 2023 20:23):    No growth to date.                                                              MEDICATIONS  (STANDING):  amLODIPine   Tablet 10 milliGRAM(s) Oral daily  aspirin  chewable 81 milliGRAM(s) Oral daily  chlorhexidine 0.12% Liquid 15 milliLiter(s) Oral Mucosa two times a day  chlorhexidine 2% Cloths 1 Application(s) Topical daily  enoxaparin Injectable 40 milliGRAM(s) SubCutaneous every 24 hours  insulin glargine Injectable (LANTUS) 25 Unit(s) SubCutaneous every morning  insulin lispro (ADMELOG) corrective regimen sliding scale   SubCutaneous three times a day before meals  insulin lispro Injectable (ADMELOG) 10 Unit(s) SubCutaneous before breakfast  insulin lispro Injectable (ADMELOG) 10 Unit(s) SubCutaneous before lunch  insulin lispro Injectable (ADMELOG) 10 Unit(s) SubCutaneous before dinner  lacosamide IVPB 100 milliGRAM(s) IV Intermittent every 12 hours  levETIRAcetam  IVPB 1000 milliGRAM(s) IV Intermittent every 12 hours  losartan 50 milliGRAM(s) Oral daily  OLANZapine 5 milliGRAM(s) Oral daily  pantoprazole  Injectable 40 milliGRAM(s) IV Push daily  piperacillin/tazobactam IVPB.. 3.375 Gram(s) IV Intermittent every 8 hours  polyethylene glycol 3350 17 Gram(s) Oral two times a day  senna 2 Tablet(s) Oral at bedtime  silver sulfADIAZINE 1% Cream 1 Application(s) Topical two times a day    MEDICATIONS  (PRN):

## 2023-08-12 NOTE — PROGRESS NOTE ADULT - ASSESSMENT
73M PMHx DM, HTN, HLD, h/o seizure disorders, h/o Bipolar Disorder, h/o BPH, GERD and h/o OM s/p multiple toe amputations presents to the ED for altered mental status/Seizure like activity. Patient was found to be severely hyperglycemic, intubated in ED for AMS, and admitted to MICU for DKA/HHS.     Acute Metabolic Encephalopathy - improved, still confused  Seizure like activity with a a history of seizure disorder  - s/p extubation, now on 2L NC, AAO x1 today   - VEEG : Moderate generalized slowing, mild to moderate bifrontal focal slowing with small number of diffusely expressed triphasic waves  - CTH without acute process  - Utox neg   - s/p neurology eval, c/w Keppra 1000 mg bid and Vimpat 100 mg bid    - c/w seizure precautions  - PT/OT  - no lubin, has a collection bag    Suspected Aspiration PNA  - clinically improving on NC  - blood cx NGTD, Procal 2.73, wound cx NGTD  - c/w zosyn for 5 day course    DKA/HHS - resolved  History of DM II  - s/p insulin drip  - c/w basal bolus insulin, monitor FS AC and HS, adjusted today     Upper Extremity bullae, likely due to edema and recent IV  - seen by burn, derm, bedside debridement performed on RUE  - wound cx NGTD     HTN - Bp better on nifedipine 60mg daily and losartan 50mg daily  - if persistently elevated, can titrate up     Thrombocytopenia - resolved, mayi 117  - DIC, HIT panels  negative  - peripheral smear  - cw  prophylactic Lovenox     h/o OM s/p multiple toe amputations  recently admitted for MSSA bacteremia s/p course of antibiotics, to be completed 8/7  recent blood cx NGTD     Lubin removed 8/10, check bladder scan q6H today   Overall prognosis is guarded    Pending: labs, clinical improvement, IV abx, BP control, PT, improvement in mental status

## 2023-08-13 LAB
ALBUMIN SERPL ELPH-MCNC: 3.6 G/DL — SIGNIFICANT CHANGE UP (ref 3.5–5.2)
ALP SERPL-CCNC: 88 U/L — SIGNIFICANT CHANGE UP (ref 30–115)
ALT FLD-CCNC: 17 U/L — SIGNIFICANT CHANGE UP (ref 0–41)
ANION GAP SERPL CALC-SCNC: 18 MMOL/L — HIGH (ref 7–14)
AST SERPL-CCNC: 18 U/L — SIGNIFICANT CHANGE UP (ref 0–41)
BASOPHILS # BLD AUTO: 0.04 K/UL — SIGNIFICANT CHANGE UP (ref 0–0.2)
BASOPHILS NFR BLD AUTO: 0.5 % — SIGNIFICANT CHANGE UP (ref 0–1)
BILIRUB SERPL-MCNC: 0.9 MG/DL — SIGNIFICANT CHANGE UP (ref 0.2–1.2)
BUN SERPL-MCNC: 16 MG/DL — SIGNIFICANT CHANGE UP (ref 10–20)
CALCIUM SERPL-MCNC: 9.2 MG/DL — SIGNIFICANT CHANGE UP (ref 8.4–10.4)
CHLORIDE SERPL-SCNC: 104 MMOL/L — SIGNIFICANT CHANGE UP (ref 98–110)
CO2 SERPL-SCNC: 22 MMOL/L — SIGNIFICANT CHANGE UP (ref 17–32)
CREAT SERPL-MCNC: 1.2 MG/DL — SIGNIFICANT CHANGE UP (ref 0.7–1.5)
CULTURE RESULTS: SIGNIFICANT CHANGE UP
EGFR: 63 ML/MIN/1.73M2 — SIGNIFICANT CHANGE UP
EOSINOPHIL # BLD AUTO: 0.01 K/UL — SIGNIFICANT CHANGE UP (ref 0–0.7)
EOSINOPHIL NFR BLD AUTO: 0.1 % — SIGNIFICANT CHANGE UP (ref 0–8)
GLUCOSE BLDC GLUCOMTR-MCNC: 227 MG/DL — HIGH (ref 70–99)
GLUCOSE BLDC GLUCOMTR-MCNC: 240 MG/DL — HIGH (ref 70–99)
GLUCOSE BLDC GLUCOMTR-MCNC: 240 MG/DL — HIGH (ref 70–99)
GLUCOSE BLDC GLUCOMTR-MCNC: 266 MG/DL — HIGH (ref 70–99)
GLUCOSE BLDC GLUCOMTR-MCNC: 312 MG/DL — HIGH (ref 70–99)
GLUCOSE SERPL-MCNC: 374 MG/DL — HIGH (ref 70–99)
HCT VFR BLD CALC: 36.2 % — LOW (ref 42–52)
HGB BLD-MCNC: 11.9 G/DL — LOW (ref 14–18)
IMM GRANULOCYTES NFR BLD AUTO: 5.5 % — HIGH (ref 0.1–0.3)
LYMPHOCYTES # BLD AUTO: 0.51 K/UL — LOW (ref 1.2–3.4)
LYMPHOCYTES # BLD AUTO: 6.1 % — LOW (ref 20.5–51.1)
MAGNESIUM SERPL-MCNC: 2 MG/DL — SIGNIFICANT CHANGE UP (ref 1.8–2.4)
MCHC RBC-ENTMCNC: 30 PG — SIGNIFICANT CHANGE UP (ref 27–31)
MCHC RBC-ENTMCNC: 32.9 G/DL — SIGNIFICANT CHANGE UP (ref 32–37)
MCV RBC AUTO: 91.2 FL — SIGNIFICANT CHANGE UP (ref 80–94)
MONOCYTES # BLD AUTO: 0.99 K/UL — HIGH (ref 0.1–0.6)
MONOCYTES NFR BLD AUTO: 11.9 % — HIGH (ref 1.7–9.3)
NEUTROPHILS # BLD AUTO: 6.34 K/UL — SIGNIFICANT CHANGE UP (ref 1.4–6.5)
NEUTROPHILS NFR BLD AUTO: 75.9 % — HIGH (ref 42.2–75.2)
NRBC # BLD: 0 /100 WBCS — SIGNIFICANT CHANGE UP (ref 0–0)
PLATELET # BLD AUTO: 239 K/UL — SIGNIFICANT CHANGE UP (ref 130–400)
PMV BLD: 10.6 FL — HIGH (ref 7.4–10.4)
POTASSIUM SERPL-MCNC: 4.6 MMOL/L — SIGNIFICANT CHANGE UP (ref 3.5–5)
POTASSIUM SERPL-SCNC: 4.6 MMOL/L — SIGNIFICANT CHANGE UP (ref 3.5–5)
PROT SERPL-MCNC: 6.2 G/DL — SIGNIFICANT CHANGE UP (ref 6–8)
RBC # BLD: 3.97 M/UL — LOW (ref 4.7–6.1)
RBC # FLD: 13.9 % — SIGNIFICANT CHANGE UP (ref 11.5–14.5)
SODIUM SERPL-SCNC: 144 MMOL/L — SIGNIFICANT CHANGE UP (ref 135–146)
SPECIMEN SOURCE: SIGNIFICANT CHANGE UP
WBC # BLD: 8.35 K/UL — SIGNIFICANT CHANGE UP (ref 4.8–10.8)
WBC # FLD AUTO: 8.35 K/UL — SIGNIFICANT CHANGE UP (ref 4.8–10.8)

## 2023-08-13 PROCEDURE — 99232 SBSQ HOSP IP/OBS MODERATE 35: CPT

## 2023-08-13 RX ORDER — INSULIN LISPRO 100/ML
4 VIAL (ML) SUBCUTANEOUS ONCE
Refills: 0 | Status: COMPLETED | OUTPATIENT
Start: 2023-08-13 | End: 2023-08-13

## 2023-08-13 RX ADMIN — OLANZAPINE 5 MILLIGRAM(S): 15 TABLET, FILM COATED ORAL at 12:28

## 2023-08-13 RX ADMIN — INSULIN GLARGINE 25 UNIT(S): 100 INJECTION, SOLUTION SUBCUTANEOUS at 08:20

## 2023-08-13 RX ADMIN — Medication 4 UNIT(S): at 22:20

## 2023-08-13 RX ADMIN — LEVETIRACETAM 400 MILLIGRAM(S): 250 TABLET, FILM COATED ORAL at 06:35

## 2023-08-13 RX ADMIN — PANTOPRAZOLE SODIUM 40 MILLIGRAM(S): 20 TABLET, DELAYED RELEASE ORAL at 12:39

## 2023-08-13 RX ADMIN — Medication 1 APPLICATION(S): at 06:34

## 2023-08-13 RX ADMIN — PIPERACILLIN AND TAZOBACTAM 25 GRAM(S): 4; .5 INJECTION, POWDER, LYOPHILIZED, FOR SOLUTION INTRAVENOUS at 13:20

## 2023-08-13 RX ADMIN — LOSARTAN POTASSIUM 50 MILLIGRAM(S): 100 TABLET, FILM COATED ORAL at 06:34

## 2023-08-13 RX ADMIN — PIPERACILLIN AND TAZOBACTAM 25 GRAM(S): 4; .5 INJECTION, POWDER, LYOPHILIZED, FOR SOLUTION INTRAVENOUS at 06:35

## 2023-08-13 RX ADMIN — LACOSAMIDE 100 MILLIGRAM(S): 50 TABLET ORAL at 06:34

## 2023-08-13 RX ADMIN — CHLORHEXIDINE GLUCONATE 15 MILLILITER(S): 213 SOLUTION TOPICAL at 18:29

## 2023-08-13 RX ADMIN — POLYETHYLENE GLYCOL 3350 17 GRAM(S): 17 POWDER, FOR SOLUTION ORAL at 18:34

## 2023-08-13 RX ADMIN — Medication 8: at 08:18

## 2023-08-13 RX ADMIN — Medication 1 APPLICATION(S): at 18:35

## 2023-08-13 RX ADMIN — Medication 81 MILLIGRAM(S): at 12:39

## 2023-08-13 RX ADMIN — Medication 10 UNIT(S): at 13:18

## 2023-08-13 RX ADMIN — LEVETIRACETAM 400 MILLIGRAM(S): 250 TABLET, FILM COATED ORAL at 18:38

## 2023-08-13 RX ADMIN — CHLORHEXIDINE GLUCONATE 1 APPLICATION(S): 213 SOLUTION TOPICAL at 11:50

## 2023-08-13 RX ADMIN — ENOXAPARIN SODIUM 40 MILLIGRAM(S): 100 INJECTION SUBCUTANEOUS at 18:38

## 2023-08-13 RX ADMIN — LACOSAMIDE 100 MILLIGRAM(S): 50 TABLET ORAL at 18:29

## 2023-08-13 RX ADMIN — Medication 4: at 18:26

## 2023-08-13 RX ADMIN — Medication 10 UNIT(S): at 08:19

## 2023-08-13 RX ADMIN — Medication 60 MILLIGRAM(S): at 06:34

## 2023-08-13 RX ADMIN — Medication 6: at 13:17

## 2023-08-13 RX ADMIN — PIPERACILLIN AND TAZOBACTAM 25 GRAM(S): 4; .5 INJECTION, POWDER, LYOPHILIZED, FOR SOLUTION INTRAVENOUS at 21:10

## 2023-08-13 RX ADMIN — CHLORHEXIDINE GLUCONATE 15 MILLILITER(S): 213 SOLUTION TOPICAL at 06:34

## 2023-08-13 RX ADMIN — Medication 10 UNIT(S): at 18:27

## 2023-08-13 RX ADMIN — Medication 1 MILLIGRAM(S): at 12:20

## 2023-08-13 NOTE — PROGRESS NOTE ADULT - SUBJECTIVE AND OBJECTIVE BOX
24H events:    Patient is a 73y old Male who presents with a chief complaint of Altered Mental Status and Hyperglycemia (13 Aug 2023 09:48)    Primary diagnosis of Acute respiratory failure with hypoxia      Today is hospital day 6d. This morning patient was seen and examined at bedside, pt is not oriented to   No acute or major events overnight.    Code Status:    Family communication:  Contact date:  Name of person contacted:  Relationship to patient:  Communication details:  What matters most:    PAST MEDICAL & SURGICAL HISTORY  DM (diabetes mellitus)  Type 2, 12/27/18 hgb aic  11.2    HTN (hypertension)    Dyslipidemia    Diabetic foot ulcer    Depression    GERD (gastroesophageal reflux disease)    Neuropathy, diabetic    Toe amputation status, right  (2008)    History of amputation of toe  LT -partial 1st toe      SOCIAL HISTORY:  Social History:      ALLERGIES:  No Known Allergies    MEDICATIONS:  STANDING MEDICATIONS  aspirin  chewable 81 milliGRAM(s) Oral daily  chlorhexidine 0.12% Liquid 15 milliLiter(s) Oral Mucosa two times a day  chlorhexidine 2% Cloths 1 Application(s) Topical daily  enoxaparin Injectable 40 milliGRAM(s) SubCutaneous every 24 hours  insulin glargine Injectable (LANTUS) 25 Unit(s) SubCutaneous every morning  insulin lispro (ADMELOG) corrective regimen sliding scale   SubCutaneous three times a day before meals  insulin lispro Injectable (ADMELOG) 10 Unit(s) SubCutaneous before breakfast  insulin lispro Injectable (ADMELOG) 10 Unit(s) SubCutaneous before lunch  insulin lispro Injectable (ADMELOG) 10 Unit(s) SubCutaneous before dinner  lacosamide IVPB 100 milliGRAM(s) IV Intermittent every 12 hours  levETIRAcetam  IVPB 1000 milliGRAM(s) IV Intermittent every 12 hours  losartan 50 milliGRAM(s) Oral daily  NIFEdipine XL 60 milliGRAM(s) Oral daily  OLANZapine 5 milliGRAM(s) Oral daily  pantoprazole  Injectable 40 milliGRAM(s) IV Push daily  piperacillin/tazobactam IVPB.. 3.375 Gram(s) IV Intermittent every 8 hours  polyethylene glycol 3350 17 Gram(s) Oral two times a day  senna 2 Tablet(s) Oral at bedtime  silver sulfADIAZINE 1% Cream 1 Application(s) Topical two times a day    PRN MEDICATIONS    VITALS:   T(F): 98.1  HR: 116  BP: 176/73  RR: 18  SpO2: 99%    PHYSICAL EXAM:  GENERAL:   (  ) NAD, lying in bed comfortably     (  ) obtunded     (  ) lethargic     (  ) somnolent    HEAD:   (  ) Atraumatic     (  ) hematoma     (  ) laceration (specify location:       )     NECK:  (  ) Supple     (  ) neck stiffness     (  ) nuchal rigidity     (  )  no JVD     (  ) JVD present ( -- cm)    HEART:  Rate -->     (  ) normal rate     (  ) bradycardic     (  ) tachycardic  Rhythm -->     (  ) regular     (  ) regularly irregular     (  ) irregularly irregular  Murmurs -->     (  ) normal s1s2     (  ) systolic murmur     (  ) diastolic murmur     (  ) continuous murmur      (  ) S3 present     (  ) S4 present    LUNGS:   (  )Unlabored respirations     (  ) tachypnea  (  ) B/L air entry     (  ) decreased breath sounds in:  (location     )    (  ) no adventitious sound     (  ) crackles     (  ) wheezing      (  ) rhonchi      (specify location:       )  (  ) chest wall tenderness (specify location:       )    ABDOMEN:   (  ) Soft     (  ) tense   |   (  ) nondistended     (  ) distended   |   (  ) +BS     (  ) hypoactive bowel sounds     (  ) hyperactive bowel sounds  (  ) nontender     (  ) RUQ tenderness     (  ) RLQ tenderness     (  ) LLQ tenderness     (  ) epigastric tenderness     (  ) diffuse tenderness  (  ) Splenomegaly      (  ) Hepatomegaly      (  ) Jaundice     (  ) ecchymosis     EXTREMITIES:  (  ) Normal     (  ) Rash     (  ) ecchymosis     (  ) varicose veins      (  ) pitting edema     (  ) non-pitting edema   (  ) ulceration     (  ) gangrene:     (location:     )    NERVOUS SYSTEM:    (  ) A&Ox3     (  ) confused     (  ) lethargic  CN II-XII:     (  ) Intact     (  ) deficits found     (Specify:     )   Upper extremities:     (  ) no sensorimotor deficits     (  ) weakness     (  ) loss of proprioception/vibration     (  ) loss of touch/temperature (specify:    )  Lower extremities:     (  ) no sensorimotor deficits     (  ) weakness     (  ) loss of proprioception/vibration     (  ) loss of touch/temperature (specify:    )    SKIN:   (  ) No rashes or lesions     (  ) maculopapular rash     (  ) pustules     (  ) vesicles     (  ) ulcer     (  ) ecchymosis     (specify location:     )    AMPAC score:    (  ) Indwelling Basilio Catheter:   Date insterted:    Reason (  ) Critical illness     (  ) urinary retention    (  ) Accurate Ins/Outs Monitoring     (  ) CMO patient    (  ) Central Line:   Date inserted:  Location: (  ) Right IJ     (  ) Left IJ     (  ) Right Fem     (  ) Left Fem    (  ) SPC        (  ) pigtail       (  ) PEG tube       (  ) colostomy       (  ) jejunostomy  (  ) U-Dall    LABS:                        11.9   8.35  )-----------( 239      ( 13 Aug 2023 07:32 )             36.2     08-13    144  |  104  |  16  ----------------------------<  374<H>  4.6   |  22  |  1.2    Ca    9.2      13 Aug 2023 07:32  Mg     2.0     08-13    TPro  6.2  /  Alb  3.6  /  TBili  0.9  /  DBili  x   /  AST  18  /  ALT  17  /  AlkPhos  88  08-13      Urinalysis Basic - ( 13 Aug 2023 07:32 )    Color: x / Appearance: x / SG: x / pH: x  Gluc: 374 mg/dL / Ketone: x  / Bili: x / Urobili: x   Blood: x / Protein: x / Nitrite: x   Leuk Esterase: x / RBC: x / WBC x   Sq Epi: x / Non Sq Epi: x / Bacteria: x                RADIOLOGY:           24H events:    Patient is a 73y old Male who presents with a chief complaint of Altered Mental Status and Hyperglycemia (13 Aug 2023 09:48)    Primary diagnosis of Acute respiratory failure with hypoxia      Today is hospital day 6d. This morning patient was seen and examined at bedside, pt is not oriented tp place and time, confused.  pt was agitated overnight        PAST MEDICAL & SURGICAL HISTORY  DM (diabetes mellitus)  Type 2, 12/27/18 hgb aic  11.2    HTN (hypertension)    Dyslipidemia    Diabetic foot ulcer    Depression    GERD (gastroesophageal reflux disease)    Neuropathy, diabetic    Toe amputation status, right  (2008)    History of amputation of toe  LT -partial 1st toe      SOCIAL HISTORY:  Social History:      ALLERGIES:  No Known Allergies    MEDICATIONS:  STANDING MEDICATIONS  aspirin  chewable 81 milliGRAM(s) Oral daily  chlorhexidine 0.12% Liquid 15 milliLiter(s) Oral Mucosa two times a day  chlorhexidine 2% Cloths 1 Application(s) Topical daily  enoxaparin Injectable 40 milliGRAM(s) SubCutaneous every 24 hours  insulin glargine Injectable (LANTUS) 25 Unit(s) SubCutaneous every morning  insulin lispro (ADMELOG) corrective regimen sliding scale   SubCutaneous three times a day before meals  insulin lispro Injectable (ADMELOG) 10 Unit(s) SubCutaneous before breakfast  insulin lispro Injectable (ADMELOG) 10 Unit(s) SubCutaneous before lunch  insulin lispro Injectable (ADMELOG) 10 Unit(s) SubCutaneous before dinner  lacosamide IVPB 100 milliGRAM(s) IV Intermittent every 12 hours  levETIRAcetam  IVPB 1000 milliGRAM(s) IV Intermittent every 12 hours  losartan 50 milliGRAM(s) Oral daily  NIFEdipine XL 60 milliGRAM(s) Oral daily  OLANZapine 5 milliGRAM(s) Oral daily  pantoprazole  Injectable 40 milliGRAM(s) IV Push daily  piperacillin/tazobactam IVPB.. 3.375 Gram(s) IV Intermittent every 8 hours  polyethylene glycol 3350 17 Gram(s) Oral two times a day  senna 2 Tablet(s) Oral at bedtime  silver sulfADIAZINE 1% Cream 1 Application(s) Topical two times a day    PRN MEDICATIONS    VITALS:   T(F): 98.1  HR: 116  BP: 176/73  RR: 18  SpO2: 99%    PHYSICAL EXAM:  GENERAL:   (  ) NAD, lying in bed comfortably     (  ) obtunded     (  ) lethargic     (  ) somnolent   (X)confused/disoriented    HEAD:   ( X ) Atraumatic     (  ) hematoma     (  ) laceration (specify location:       )     NECK:  (X  ) Supple     (  ) neck stiffness     (  ) nuchal rigidity     (  )  no JVD     (  ) JVD present ( -- cm)    HEART:  Rate -->     ( X ) normal rate     (  ) bradycardic     (  ) tachycardic  Rhythm -->     ( X ) regular     (  ) regularly irregular     (  ) irregularly irregular  Murmurs -->     ( X ) normal s1s2     (  ) systolic murmur     (  ) diastolic murmur     (  ) continuous murmur      (  ) S3 present     (  ) S4 present    LUNGS:   (  )Unlabored respirations     (  ) tachypnea  ( X ) B/L air entry     (  ) decreased breath sounds in:  (location     )    ( X ) no adventitious sound     (  ) crackles     (  ) wheezing      (  ) rhonchi      (specify location:       )  (  ) chest wall tenderness (specify location:       )    ABDOMEN:   (X  ) Soft     (  ) tense   |   (  ) nondistended     (  ) distended   |   ( X ) +BS     (  ) hypoactive bowel sounds     (  ) hyperactive bowel sounds  (X  ) nontender     (  ) RUQ tenderness     (  ) RLQ tenderness     (  ) LLQ tenderness     (  ) epigastric tenderness     (  ) diffuse tenderness  (  ) Splenomegaly      (  ) Hepatomegaly      (  ) Jaundice     (  ) ecchymosis     EXTREMITIES:  ( X ) Normal     (  ) Rash     (  ) ecchymosis     (  ) varicose veins      (  ) pitting edema     (  ) non-pitting edema   (  ) ulceration     (  ) gangrene:     (location:     )    NERVOUS SYSTEM:    (X  ) A&Ox1     (  ) confused     (  ) lethargic  CN II-XII:     (  ) Intact     (  ) deficits found     (Specify:     )   Upper extremities:     (  ) no sensorimotor deficits     (  ) weakness     (  ) loss of proprioception/vibration     (  ) loss of touch/temperature (specify:    )  Lower extremities:     (  ) no sensorimotor deficits     (  ) weakness     (  ) loss of proprioception/vibration     (  ) loss of touch/temperature (specify:    )    SKIN:   (X  ) No rashes or lesions     (  ) maculopapular rash     (  ) pustules     (  ) vesicles     (  ) ulcer     (  ) ecchymosis     (specify location:     )    AMPAC score:    (  ) Indwelling Basilio Catheter:   Date insterted:    Reason (  ) Critical illness     (  ) urinary retention    (  ) Accurate Ins/Outs Monitoring     (  ) CMO patient    (  ) Central Line:   Date inserted:  Location: (  ) Right IJ     (  ) Left IJ     (  ) Right Fem     (  ) Left Fem    (  ) SPC        (  ) pigtail       (  ) PEG tube       (  ) colostomy       (  ) jejunostomy  (  ) U-Dall    LABS:                        11.9   8.35  )-----------( 239      ( 13 Aug 2023 07:32 )             36.2     08-13    144  |  104  |  16  ----------------------------<  374<H>  4.6   |  22  |  1.2    Ca    9.2      13 Aug 2023 07:32  Mg     2.0     08-13    TPro  6.2  /  Alb  3.6  /  TBili  0.9  /  DBili  x   /  AST  18  /  ALT  17  /  AlkPhos  88  08-13      Urinalysis Basic - ( 13 Aug 2023 07:32 )    Color: x / Appearance: x / SG: x / pH: x  Gluc: 374 mg/dL / Ketone: x  / Bili: x / Urobili: x   Blood: x / Protein: x / Nitrite: x   Leuk Esterase: x / RBC: x / WBC x   Sq Epi: x / Non Sq Epi: x / Bacteria: x                RADIOLOGY:  CT Head No Cont (08.09.23)  IMPRESSION:    No acute intracranial pathology.

## 2023-08-13 NOTE — OCCUPATIONAL THERAPY INITIAL EVALUATION ADULT - IMPAIRED TRANSFERS: SIT/STAND, REHAB EVAL
impaired safety awareness/insight/impaired balance/cognition/impaired coordination/decreased flexibility/decreased strength

## 2023-08-13 NOTE — PHYSICAL THERAPY INITIAL EVALUATION ADULT - HEALTH SCREEN CRITERIA
Head, normocephalic, atraumatic, Face, Face within normal limits, Ears, External ears within normal limits
yes

## 2023-08-13 NOTE — BH CONSULTATION LIAISON ASSESSMENT NOTE - HPI (INCLUDE ILLNESS QUALITY, SEVERITY, DURATION, TIMING, CONTEXT, MODIFYING FACTORS, ASSOCIATED SIGNS AND SYMPTOMS)
Tariq Ceja is a 73-year-old male, domiciled with his current wife of 12 years, no dependents, retired, PMH of DM, HTN, HLD, BPH, GERD, primary seizure disorder, past psychiatric history per chart of Bipolar disorder, no known inpatient psychiatric hospitalizations, no known history of suicide attempts, no known current outpatient psychiatrist, no known past substance use history although per collateral concerns for opioid abuse, admitted to medicine for altered mental status. Psychiatry consulted for mental health evaluation.    Upon approach, patient was sleeping in bed, a waste restraint was in place, and his daughter and ex-wife were present at bedside. This writer attempted to wake the patient up with verbal and physical stimuli, but patient would open his eyes for a brief period, look toward this writer, and then close them before going back to sleep. Patient did not speak at all during this time. When asked if the psychiatric team could speak with his daughter and ex-wife at bedside, patient nodded his head very subtly.    Spoke with daughter Amber Ceja ([phone]) and ex-wife (name/number not provided Tariq Ceja is a 73-year-old male, domiciled with his current wife of 12 years, no dependents, retired, PMH of DM, HTN, HLD, BPH, GERD, primary seizure disorder, past psychiatric history per chart of Bipolar disorder, no known inpatient psychiatric hospitalizations, no known history of suicide attempts, no known current outpatient psychiatrist, no known past substance use history although per collateral concerns for opioid abuse, admitted to medicine for altered mental status. Psychiatry consulted for mental health evaluation.    Upon approach, patient was sleeping in bed, a waste restraint was in place, and his daughter and ex-wife were present at bedside. This writer attempted to wake the patient up with verbal and physical stimuli, but patient would open his eyes for a brief period, look toward this writer, and then close them before going back to sleep. Patient did not speak at all during this time. When asked if the psychiatric team could speak with his daughter and ex-wife at bedside, patient nodded his head very subtly.    Spoke with daughter Amber Ceja ([phone]) and ex-wife (name/number not provided) at bedside. Both state they just came from vacation recently and do not know why patient came to the hospital. Both state that patient is a very private person and they do not know much about his mental health history. Ex-wife states she was in a relationship with the patient for 13 years that ended in 1989. States she is only aware that in the 80s he was following with an outpatient psychiatrist at [facility] (then called [facility]). Ex-wife states she does not know why he started seeing this psychiatrist or whether patient was on medications. States the only thing patient told her was that he was diagnosed with "paranoid schizophrenia." Wife states patient was never hospitalized in the 1980s, never attempted suicide, never displayed behaviors such as talking to himself or being paranoid or state he was hearing voices or seeing things, never displayed manic/hypomanic episodes, never endorsed homicidal ideation or aggression toward other people. Both wife and daughter state they do not often see patient over the past few years and do not have any additional information about his mental health history. They both state at his baseline patient is a "happy person, likes to make other people happy."     Called patient's current wife Mattie Anaya ([phone]).   -States she called EMS after patient started to be altered almost to the point of being unconscious at home. States when EMS arrived they told her it was seizure. States she last visited patient yesterday, and reports patient made odd statements such as talking about someone with a gun was trying to kill him and stating a "brown-skinned man is behind the door" even though there was no such thing.   -Wife also states she does not know about patient's mental health history. However she does state they have been together for the past 13 years and patient has never been hospitalized and never demonstrated suicidality, easton, or psychosis. Wife states the big concern is that patient is getting "pain medication" from some disabled friends and hiding them around the home. Wife states she has no idea what medication this is or how much he is taking, but reports he will appear fine and talking normally one second, disappear to a room where she suspects there are pills, then come back to the living room and immediately fall asleep and be unable to sustain a conversation.

## 2023-08-13 NOTE — BH CONSULTATION LIAISON ASSESSMENT NOTE - RISK ASSESSMENT
Risk factors - acute medical problem, chronic medical problems, male sex, suspected underlying mood disorder, potential substance opioid abuse).    Protective factors - no known history of suicide attempts, family support, residential stability, relationship stability

## 2023-08-13 NOTE — PHYSICAL THERAPY INITIAL EVALUATION ADULT - GENERAL OBSERVATIONS, REHAB EVAL
8:40-9:20 Pt encountered semi gomez in bed in NAD with +call bell, +bed rails, +bed alarm, 1:1 sit, +rock and roll, +IV, agreeable to therapy.

## 2023-08-13 NOTE — BH CONSULTATION LIAISON ASSESSMENT NOTE - NSBHCHARTREVIEWLAB_PSY_A_CORE FT
11.9   8.35  )-----------( 239      ( 13 Aug 2023 07:32 )             36.2     08-13    144  |  104  |  16  ----------------------------<  374<H>  4.6   |  22  |  1.2    Ca    9.2      13 Aug 2023 07:32  Mg     2.0     08-13    TPro  6.2  /  Alb  3.6  /  TBili  0.9  /  DBili  x   /  AST  18  /  ALT  17  /  AlkPhos  88  08-13

## 2023-08-13 NOTE — OCCUPATIONAL THERAPY INITIAL EVALUATION ADULT - PLANNED THERAPY INTERVENTIONS, OT EVAL
ADL retraining/balance training/bed mobility training/cognitive, visual perceptual/fine motor coordination training/motor coordination training/neuromuscular re-education/ROM/strengthening/stretching/transfer training

## 2023-08-13 NOTE — BH CONSULTATION LIAISON ASSESSMENT NOTE - NICOTINE
Tracking was updated.  Lab order placed with [facility]. Anticipated INR recheck date: 1/28/21  INR: 3.7 on 1/18/21  Order expires 10/5/21.       None known

## 2023-08-13 NOTE — PROGRESS NOTE ADULT - SUBJECTIVE AND OBJECTIVE BOX
CHARLOTTE JOSÉ MANUEL  73y, Male  Allergy: No Known Allergies    Hospital Day: 6d    Patient seen and examined. No acute events overnight    PMH/PSH:  PAST MEDICAL & SURGICAL HISTORY:  DM (diabetes mellitus)  Type 2, 12/27/18 hgb aic  11.2      HTN (hypertension)      Dyslipidemia      Diabetic foot ulcer      Depression      GERD (gastroesophageal reflux disease)      Neuropathy, diabetic      Toe amputation status, right  (2008)      History of amputation of toe  LT -partial 1st toe          VITALS:  T(F): 98.1 (08-12-23 @ 17:13), Max: 98.1 (08-12-23 @ 17:13)  HR: 116 (08-13-23 @ 05:00)  BP: 176/73 (08-13-23 @ 05:00) (136/62 - 176/73)  RR: 18 (08-12-23 @ 21:00)  SpO2: 99% (08-12-23 @ 21:00)    TESTS & MEASUREMENTS:  Weight (Kg):                             11.9   8.35  )-----------( 239      ( 13 Aug 2023 07:32 )             36.2       08-12    141  |  105  |  12  ----------------------------<  321<H>  4.4   |  24  |  1.1    Ca    8.9      12 Aug 2023 06:51  Mg     2.0     08-12    TPro  5.7<L>  /  Alb  3.3<L>  /  TBili  0.9  /  DBili  x   /  AST  18  /  ALT  13  /  AlkPhos  85  08-12    LIVER FUNCTIONS - ( 12 Aug 2023 06:51 )  Alb: 3.3 g/dL / Pro: 5.7 g/dL / ALK PHOS: 85 U/L / ALT: 13 U/L / AST: 18 U/L / GGT: x                 Culture - Tissue with Gram Stain (collected 08-09-23 @ 13:07)  Source: .Tissue RUE  Gram Stain (08-10-23 @ 01:59):    No polymorphonuclear leukocytes seen per low power field    No organisms seen per oil power field  Preliminary Report (08-10-23 @ 20:23):    No growth to date.    Culture - Blood (collected 08-09-23 @ 05:08)  Source: .Blood None  Preliminary Report (08-12-23 @ 14:01):    No growth at 72 Hours    Culture - Blood (collected 08-08-23 @ 12:20)  Source: .Blood None  Preliminary Report (08-12-23 @ 23:00):    No growth at 4 days    Culture - Urine (collected 08-07-23 @ 06:40)  Source: Clean Catch Clean Catch (Midstream)  Final Report (08-08-23 @ 09:42):    No growth    Culture - Blood (collected 08-07-23 @ 03:15)  Source: .Blood Blood-Peripheral  Final Report (08-12-23 @ 14:00):    No growth at 5 days    Culture - Blood (collected 08-07-23 @ 03:15)  Source: .Blood Blood-Peripheral  Final Report (08-12-23 @ 14:00):    No growth at 5 days      Urinalysis Basic - ( 12 Aug 2023 06:51 )    Color: x / Appearance: x / SG: x / pH: x  Gluc: 321 mg/dL / Ketone: x  / Bili: x / Urobili: x   Blood: x / Protein: x / Nitrite: x   Leuk Esterase: x / RBC: x / WBC x   Sq Epi: x / Non Sq Epi: x / Bacteria: x        RADIOLOGY & ADDITIONAL TESTS:    RECENT DIAGNOSTIC ORDERS:  Magnesium: AM Sched. Collection: 13-Aug-2023 04:30 (08-12-23 @ 10:31)  Comprehensive Metabolic Panel: AM Sched. Collection: 13-Aug-2023 04:30 (08-12-23 @ 10:31)      MEDICATIONS:  MEDICATIONS  (STANDING):  aspirin  chewable 81 milliGRAM(s) Oral daily  chlorhexidine 0.12% Liquid 15 milliLiter(s) Oral Mucosa two times a day  chlorhexidine 2% Cloths 1 Application(s) Topical daily  enoxaparin Injectable 40 milliGRAM(s) SubCutaneous every 24 hours  insulin glargine Injectable (LANTUS) 25 Unit(s) SubCutaneous every morning  insulin lispro (ADMELOG) corrective regimen sliding scale   SubCutaneous three times a day before meals  insulin lispro Injectable (ADMELOG) 10 Unit(s) SubCutaneous before lunch  insulin lispro Injectable (ADMELOG) 10 Unit(s) SubCutaneous before dinner  insulin lispro Injectable (ADMELOG) 10 Unit(s) SubCutaneous before breakfast  lacosamide IVPB 100 milliGRAM(s) IV Intermittent every 12 hours  levETIRAcetam  IVPB 1000 milliGRAM(s) IV Intermittent every 12 hours  losartan 50 milliGRAM(s) Oral daily  NIFEdipine XL 60 milliGRAM(s) Oral daily  OLANZapine 5 milliGRAM(s) Oral daily  pantoprazole  Injectable 40 milliGRAM(s) IV Push daily  piperacillin/tazobactam IVPB.. 3.375 Gram(s) IV Intermittent every 8 hours  polyethylene glycol 3350 17 Gram(s) Oral two times a day  senna 2 Tablet(s) Oral at bedtime  silver sulfADIAZINE 1% Cream 1 Application(s) Topical two times a day    MEDICATIONS  (PRN):      HOME MEDICATIONS:  Actos 15 mg oral tablet (05-17)  Jardiance 25 mg oral tablet (05-17)  levETIRAcetam 750 mg oral tablet (05-17)  Trulicity Pen 3 mg/0.5 mL subcutaneous solution (05-17)      REVIEW OF SYSTEMS:  All other review of systems is negative unless indicated above.     PHYSICAL EXAM:  PHYSICAL EXAM:  GENERAL: NAD  HEAD:  Atraumatic, Normocephalic  NECK: Supple, No JVD  CHEST/LUNG: Clear to auscultation bilaterally; No wheeze  HEART: Regular rate and rhythm; No murmurs, rubs, or gallops  ABDOMEN: Soft, Nontender, Nondistended; Bowel sounds present  EXTREMITIES:  2+ Peripheral Pulses, No clubbing, cyanosis, or edema  SKIN: No rashes or lesions

## 2023-08-13 NOTE — PROGRESS NOTE ADULT - ASSESSMENT
73M PMHx DM, HTN, HLD, h/o seizure disorders, h/o Bipolar Disorder, h/o BPH, GERD and h/o OM s/p multiple toe amputations presents to the ED for altered mental status/Seizure like activity. Patient was found to be severely hyperglycemic, intubated in ED for AMS, and admitted to MICU for DKA/HHS.     #Acute Metabolic Encephalopathy - improved, still confused  #Seizure like activity with a a history of seizure disorder  - s/p extubation, now on 2L NC, AAO x2 today . Walked with PT  - VEEG : Moderate generalized slowing, mild to moderate bifrontal focal slowing with small number of diffusely expressed triphasic waves  - CTH without acute process  - Utox neg   - s/p neurology eval, c/w Keppra 1000 mg bid and Vimpat 100 mg bid    - c/w seizure precautions  - PT/OT  - no tristian, has a collection bag  - Cont PT    #Suspected Aspiration PNA  - clinically improving on NC  - blood cx NGTD, Procal 2.73, wound cx NGTD  - c/w zosyn for 5 day course, can dc tomorrow    #DKA/HHS - resolved  #History of DM II  - s/p insulin drip  - c/w basal bolus insulin, monitor FS AC and HS, adjusted today     #Upper Extremity bullae, likely due to edema and recent IV  - seen by burn, derm, bedside debridement performed on RUE  - wound cx NGTD     #HTN   - Bp better on nifedipine 60mg daily and losartan 50mg daily  - if persistently elevated, can titrate up     #Thrombocytopenia - resolved, mayi 117  - DIC, HIT panels  negative  - peripheral smear  - cw  prophylactic Lovenox     #h/o OM s/p multiple toe amputations  recently admitted for MSSA bacteremia s/p course of antibiotics, to be completed 8/7  recent blood cx NGTD     #hx of bipolar disorder  - Cont zyprexa 5mg qD    DVT PPX Lovenox    #Progress Note Handoff  Pending (specify): Continue monitoring mental status, cont IV abx  Disposition: TBD

## 2023-08-13 NOTE — PHYSICAL THERAPY INITIAL EVALUATION ADULT - ADDITIONAL COMMENTS
History obtained from 201 form and pt himself, however pt is poor historian. Pt lives with wife and children in private house with 5 steps to enter 0 inside. Pt ambulates at baseline with RW and SC, supervision for dressing and showering with shower chair.

## 2023-08-13 NOTE — OCCUPATIONAL THERAPY INITIAL EVALUATION ADULT - PERTINENT HX OF CURRENT PROBLEM, REHAB EVAL
73M PMHx DM, HTN, HLD, h/o seizure disorders, h/o Bipolar Disorder, h/o BPH, GERD and h/o OM s/p multiple toe amputations presents to the ED for altered mental status. As per wife, patient had a witnessed seizure-like activity at home that lasted for several minutes. EMS was called and found patient to be severely hyperglycemic. Patient was arousable while en route to the hospital. Wife reports that patient has not been acting himself for the last 2 weeks. States that he has a drug problem and takes medications for his chronic pain. He has also been hiding his medications and she is unsure if he has been taking any of them. As per patient's wife, his medications should be the same since last admission except for one possible change, but she is unsure of which one.

## 2023-08-13 NOTE — PHYSICAL THERAPY INITIAL EVALUATION ADULT - NSACTIVITYREC_GEN_A_PT
Patient requires assistance with functional mobility. PT recommends D/C to rehabilitation facility when medically appropriate. Refer to evaluation for details.

## 2023-08-13 NOTE — OCCUPATIONAL THERAPY INITIAL EVALUATION ADULT - SHORT TERM MEMORY, REHAB EVAL
able to repeat 3/3 items immediately, unable to recall 3/3 items post ~5 minutes, severe impairment noticed/impaired

## 2023-08-13 NOTE — BH CONSULTATION LIAISON ASSESSMENT NOTE - NSBHREFERDETAILS_PSY_A_CORE_FT
h/o bipolar disorder, not on home meds, does not follow o/p psych, now has been hiding meds and refusing to take at home.

## 2023-08-13 NOTE — OCCUPATIONAL THERAPY INITIAL EVALUATION ADULT - NSOTDISCHREC_GEN_A_CORE
Patient requires assistance with activities of daily living. OT recommends D/C to rehabilitation facility when medically appropriate. Refer to evaluation/treatment for details. Sub-acute Rehab

## 2023-08-13 NOTE — BH CONSULTATION LIAISON ASSESSMENT NOTE - OTHER
Collateral obtained from:  -at bedside, daughter Amber Ceja ([phone])  -over the phone, wife Mattie Anaya ([phone])

## 2023-08-13 NOTE — OCCUPATIONAL THERAPY INITIAL EVALUATION ADULT - GENERAL OBSERVATIONS, REHAB EVAL
oral
Pt encountered semi gomez in bed in NAD +b/l sequentials +pulse oxi +BP cuff +IV(disconnected by AMPARO Gordon) +RUE blisters +rock n roll bedbelt. Pt agreed to OT eval with 1:1 SIT at bedside. Pt left semi gomez in bed in NAD, AMPARO Gordon aware.

## 2023-08-13 NOTE — BH CONSULTATION LIAISON ASSESSMENT NOTE - NSBHCHARTREVIEWINVESTIGATE_PSY_A_CORE FT
< from: 12 Lead ECG (08.09.23 @ 16:44) >    Ventricular Rate 100 BPM    Atrial Rate 100 BPM    P-R Interval 96 ms    QRS Duration 62 ms    Q-T Interval 518 ms    QTC Calculation(Bazett) 668 ms    P Axis 42 degrees    R Axis 8 degrees    T Axis 49 degrees    Diagnosis Line Sinus rhythm with short AK with occasional Premature ventricular complexes  Nonspecific T wave abnormality  Prolonged QT  Abnormal ECG    < end of copied text >

## 2023-08-13 NOTE — BH CONSULTATION LIAISON ASSESSMENT NOTE - NSBHMSETHTASSOC_PSY_A_CORE
Dear Bria,     Your test results are attached.    The heart ultrasound is within normal limits.    Please notify me via [facility] or contact the clinic at [phone] if you have any questions.    Stephanie Saldana MD Unable to assess

## 2023-08-13 NOTE — BH CONSULTATION LIAISON ASSESSMENT NOTE - NSBHINDICATION_PSY_ALL_CORE
There does not appear to be a psychiatric indication for 1:1 constant observation, but given delirium and potential resulting disorganized behavior may consider 1:1 for safety due to delirium.

## 2023-08-13 NOTE — BH CONSULTATION LIAISON ASSESSMENT NOTE - NSBHSAOPI_PSY_A_CORE FT
Although not clear at this time, there is suspicion that patient may be using illicit opioids as per collateral information form his wife. However, this needs to be confirmed with the patient.

## 2023-08-13 NOTE — BH CONSULTATION LIAISON ASSESSMENT NOTE - NSBHCONSULTSUBSTANCEOPIATES_PSY_A_CORE FT
Recommend continued symptomatic management of opioid withdrawals:        - Clonidine 0.1 mg PO q6 PRN for Autonomic Instability         - Tylenol 650mg PO q6 PRN for Generalized Body Pain        - Zofran 4mg PO q8 PRN for Nausea and Vomiting         - Claritin 10mg PO qD PRN for Nasal Congestion         - Imodium 2mg PRN for Diarrhea

## 2023-08-13 NOTE — BH CONSULTATION LIAISON ASSESSMENT NOTE - PATIENT'S CHIEF COMPLAINT
" " No chief complaint offered. Patient was sleeping and could not be woken up long enough to offer a chief complaint. Patient immediately fell back asleep when awoken.

## 2023-08-13 NOTE — BH CONSULTATION LIAISON ASSESSMENT NOTE - CURRENT MEDICATION
MEDICATIONS  (STANDING):  aspirin  chewable 81 milliGRAM(s) Oral daily  chlorhexidine 0.12% Liquid 15 milliLiter(s) Oral Mucosa two times a day  chlorhexidine 2% Cloths 1 Application(s) Topical daily  enoxaparin Injectable 40 milliGRAM(s) SubCutaneous every 24 hours  insulin glargine Injectable (LANTUS) 25 Unit(s) SubCutaneous every morning  insulin lispro (ADMELOG) corrective regimen sliding scale   SubCutaneous three times a day before meals  insulin lispro Injectable (ADMELOG) 10 Unit(s) SubCutaneous before breakfast  insulin lispro Injectable (ADMELOG) 10 Unit(s) SubCutaneous before lunch  insulin lispro Injectable (ADMELOG) 10 Unit(s) SubCutaneous before dinner  lacosamide IVPB 100 milliGRAM(s) IV Intermittent every 12 hours  levETIRAcetam  IVPB 1000 milliGRAM(s) IV Intermittent every 12 hours  losartan 50 milliGRAM(s) Oral daily  NIFEdipine XL 60 milliGRAM(s) Oral daily  OLANZapine 5 milliGRAM(s) Oral daily  pantoprazole  Injectable 40 milliGRAM(s) IV Push daily  piperacillin/tazobactam IVPB.. 3.375 Gram(s) IV Intermittent every 8 hours  polyethylene glycol 3350 17 Gram(s) Oral two times a day  senna 2 Tablet(s) Oral at bedtime  silver sulfADIAZINE 1% Cream 1 Application(s) Topical two times a day    MEDICATIONS  (PRN):

## 2023-08-13 NOTE — PROGRESS NOTE ADULT - ASSESSMENT
73M PMHx DM, HTN, HLD, h/o seizure disorders, h/o Bipolar Disorder, h/o BPH, GERD and h/o OM s/p multiple toe amputations presents to the ED for altered mental status/Seizure like activity. Patient was found to be severely hyperglycemic, intubated in ED for AMS, and admitted to MICU for DKA/HHS.     #Acute Metabolic Encephalopathy - improved, still confused  -Seizure like activity with a a history of seizure disorder  - s/p extubation, now on 2L NC, AAO x2 today . Walked with PT  - VEEG : Moderate generalized slowing, mild to moderate bifrontal focal slowing with small number of diffusely expressed triphasic waves  - CTH without acute process  - Utox neg   - s/p neurology eval, c/w Keppra 1000 mg bid and Vimpat 100 mg bid    - c/w seizure precautions  - PT/OT  - no tristian, has a collection bag  - Cont PT    #Suspected Aspiration PNA  - clinically improving on NC  - blood cx NGTD, Procal 2.73, wound cx NGTD  - c/w zosyn for 5 day course, can dc tomorrow    #DKA/HHS - resolved  #History of DM II  - s/p insulin drip  - c/w basal bolus insulin, monitor FS AC and HS, adjusted today     #Upper Extremity bullae, likely due to edema and recent IV  - seen by burn, derm, bedside debridement performed on RUE  - wound cx NGTD     #HTN   - Bp better on nifedipine 60mg daily and losartan 50mg daily  - if persistently elevated, can titrate up     #Thrombocytopenia - resolved, mayi 117  - DIC, HIT panels  negative  - peripheral smear  - cw  prophylactic Lovenox     #h/o OM s/p multiple toe amputations  -recently admitted for MSSA bacteremia s/p course of antibiotics, to be completed 8/7  -recent blood cx NGTD     #hx of bipolar disorder  - Cont zyprexa 5mg qD    DVT PPX Lovenox    #Progress Note Handoff  Pending (specify): Continue monitoring mental status, cont IV abx  Disposition: TBD

## 2023-08-13 NOTE — BH CONSULTATION LIAISON ASSESSMENT NOTE - SUMMARY
Tariq Ceja is a 73-year-old male, domiciled with his current wife of 12 years, no dependents, retired, PMH of DM, HTN, HLD, BPH, GERD, primary seizure disorder, past psychiatric history per chart of Bipolar disorder, no known inpatient psychiatric hospitalizations, no known history of suicide attempts, no known current outpatient psychiatrist, no known past substance use history although per collateral concerns for opioid abuse, admitted to medicine for altered mental status. Psychiatry consulted for mental health evaluation.    On psychiatric evaluation, patient demonstrates marked lethargic presentation that is briefly arousable to verbal and physical stimuli and a mental status that is currently altered but appears to be waxing and waning during his hospital presentation (e.g., wife reports yesterday he was more verbal and awake). There also appears to be moments of what may be psychosis (e.g., visual hallucinations of a brown-skinned man in his room) and delusional thoughts (e.g., previously endorsed a man with a gun is out to kill him). This presentation is consistent with delirium, and is likely due to a medical condition. The plan should be to continue treating this patient medically, as the management of delirium is to treat the underlying cause such as an infection.     However, there is also concern that patient may be using opioids via prescription meds obtained from meds daily at home. This cannot be said for certain, as his wife was unable to state for certain that these meds are opioids and how much and how frequently patient is taking them. But this will be important to consider, opioid withdrawal can contribute to delirium/altered mental status. Patient may benefit from symptomatic management of withdrawals symptoms from opioids.     Tariq Ceja is a 73-year-old male, domiciled with his current wife of 12 years, no dependents, retired, PMH of DM, HTN, HLD, BPH, GERD, primary seizure disorder, past psychiatric history per chart of Bipolar disorder, no known inpatient psychiatric hospitalizations, no known history of suicide attempts, no known current outpatient psychiatrist, no known past substance use history although per collateral concerns for opioid abuse, admitted to medicine for altered mental status. Psychiatry consulted for mental health evaluation.    On psychiatric evaluation, patient demonstrates marked lethargic presentation that is briefly arousable to verbal and physical stimuli and a mental status that is currently altered but appears to be waxing and waning during his hospital presentation (e.g., wife reports yesterday he was more verbal and awake). There also appears to be moments of what may be psychosis (e.g., visual hallucinations of a brown-skinned man in his room) and delusional thoughts (e.g., previously endorsed a man with a gun is out to kill him). This presentation is consistent with delirium, and is likely due to a medical condition. The plan should be to continue treating this patient medically, as the management of delirium is to treat the underlying cause such as an infection.     However, there is also concern that patient may be using opioids via prescription meds obtained from meds daily at home. This cannot be said for certain, as his wife was unable to state for certain that these meds are opioids and how much and how frequently patient is taking them. But this will be important to consider, opioid withdrawal can contribute to delirium/altered mental status. Patient may benefit from symptomatic management of withdrawals symptoms from opioids.    Regarding an underlying primary psychiatric condition such as Bipolar disorder (which is what is written in the chart) or Schizophrenia (which is what family members stated patient endorsed he had), it will be important to reevaluate this patient once his delirium resolves and he is oriented in order to perform a proper psychiatric assessment. Patient will need to be seen by psychiatry prior to discharge to ensure there is no acute psychiatric symptoms present. At this time, will recommend to hold off starting any psychiatric medication until mental status resolves.    Recommendations:  - Patient needs further psychiatric assessment prior to discharge  - Recheck EKG to monitor QTc. EKG from 8/13/2023 showed elevated QTc of 688ms. Recommend to hold off on antipsychotics given this finding.  -Recommend continued symptomatic management of opioid withdrawals:        - Clonidine 0.1 mg PO q6 PRN for Autonomic Instability         - Tylenol 650mg PO q6 PRN for Generalized Body Pain        - Zofran 4mg PO q8 PRN for Nausea and Vomiting         - Claritin 10mg PO qD PRN for Nasal Congestion         - Imodium 2mg PRN for Diarrhea     - Delirium precautions include:  - place patient's bed near window  - optimize sleep-wake cycle, minimize environmental noise  - reorientation techniques and memory cues such as calendar, clocks, family photos  - use verbal redirection as first line  - minimize restraints and lines  - ensure adequate pain control, use opioid sparing regimens when possible  - minimize use of anticholinergic, antihistaminic, and benzodiazepine medications

## 2023-08-14 LAB
ALBUMIN SERPL ELPH-MCNC: 3.2 G/DL — LOW (ref 3.5–5.2)
ALP SERPL-CCNC: 73 U/L — SIGNIFICANT CHANGE UP (ref 30–115)
ALT FLD-CCNC: 19 U/L — SIGNIFICANT CHANGE UP (ref 0–41)
ANION GAP SERPL CALC-SCNC: 11 MMOL/L — SIGNIFICANT CHANGE UP (ref 7–14)
AST SERPL-CCNC: 21 U/L — SIGNIFICANT CHANGE UP (ref 0–41)
BASOPHILS # BLD AUTO: 0.04 K/UL — SIGNIFICANT CHANGE UP (ref 0–0.2)
BASOPHILS NFR BLD AUTO: 0.6 % — SIGNIFICANT CHANGE UP (ref 0–1)
BILIRUB SERPL-MCNC: 0.5 MG/DL — SIGNIFICANT CHANGE UP (ref 0.2–1.2)
BUN SERPL-MCNC: 21 MG/DL — HIGH (ref 10–20)
CALCIUM SERPL-MCNC: 8.5 MG/DL — SIGNIFICANT CHANGE UP (ref 8.4–10.5)
CHLORIDE SERPL-SCNC: 106 MMOL/L — SIGNIFICANT CHANGE UP (ref 98–110)
CO2 SERPL-SCNC: 26 MMOL/L — SIGNIFICANT CHANGE UP (ref 17–32)
CREAT SERPL-MCNC: 1.3 MG/DL — SIGNIFICANT CHANGE UP (ref 0.7–1.5)
CULTURE RESULTS: SIGNIFICANT CHANGE UP
CULTURE RESULTS: SIGNIFICANT CHANGE UP
EGFR: 57 ML/MIN/1.73M2 — LOW
EOSINOPHIL # BLD AUTO: 0.04 K/UL — SIGNIFICANT CHANGE UP (ref 0–0.7)
EOSINOPHIL NFR BLD AUTO: 0.6 % — SIGNIFICANT CHANGE UP (ref 0–8)
GLUCOSE BLDC GLUCOMTR-MCNC: 150 MG/DL — HIGH (ref 70–99)
GLUCOSE BLDC GLUCOMTR-MCNC: 162 MG/DL — HIGH (ref 70–99)
GLUCOSE BLDC GLUCOMTR-MCNC: 170 MG/DL — HIGH (ref 70–99)
GLUCOSE BLDC GLUCOMTR-MCNC: 188 MG/DL — HIGH (ref 70–99)
GLUCOSE SERPL-MCNC: 177 MG/DL — HIGH (ref 70–99)
HCT VFR BLD CALC: 35.3 % — LOW (ref 42–52)
HGB BLD-MCNC: 11.3 G/DL — LOW (ref 14–18)
IMM GRANULOCYTES NFR BLD AUTO: 2.6 % — HIGH (ref 0.1–0.3)
LACOSAMIDE (VIMPAT) RESULT: 5 UG/ML — SIGNIFICANT CHANGE UP (ref 1–10)
LEVETIRACETAM SERPL-MCNC: 24.2 UG/ML — SIGNIFICANT CHANGE UP (ref 10–40)
LYMPHOCYTES # BLD AUTO: 0.55 K/UL — LOW (ref 1.2–3.4)
LYMPHOCYTES # BLD AUTO: 7.9 % — LOW (ref 20.5–51.1)
MAGNESIUM SERPL-MCNC: 2.1 MG/DL — SIGNIFICANT CHANGE UP (ref 1.8–2.4)
MCHC RBC-ENTMCNC: 29.6 PG — SIGNIFICANT CHANGE UP (ref 27–31)
MCHC RBC-ENTMCNC: 32 G/DL — SIGNIFICANT CHANGE UP (ref 32–37)
MCV RBC AUTO: 92.4 FL — SIGNIFICANT CHANGE UP (ref 80–94)
MONOCYTES # BLD AUTO: 0.75 K/UL — HIGH (ref 0.1–0.6)
MONOCYTES NFR BLD AUTO: 10.8 % — HIGH (ref 1.7–9.3)
NEUTROPHILS # BLD AUTO: 5.39 K/UL — SIGNIFICANT CHANGE UP (ref 1.4–6.5)
NEUTROPHILS NFR BLD AUTO: 77.5 % — HIGH (ref 42.2–75.2)
NRBC # BLD: 0 /100 WBCS — SIGNIFICANT CHANGE UP (ref 0–0)
PHOSPHATE SERPL-MCNC: 3.7 MG/DL — SIGNIFICANT CHANGE UP (ref 2.1–4.9)
PLATELET # BLD AUTO: 217 K/UL — SIGNIFICANT CHANGE UP (ref 130–400)
PMV BLD: 10.7 FL — HIGH (ref 7.4–10.4)
POTASSIUM SERPL-MCNC: 4 MMOL/L — SIGNIFICANT CHANGE UP (ref 3.5–5)
POTASSIUM SERPL-SCNC: 4 MMOL/L — SIGNIFICANT CHANGE UP (ref 3.5–5)
PROT SERPL-MCNC: 5.3 G/DL — LOW (ref 6–8)
RBC # BLD: 3.82 M/UL — LOW (ref 4.7–6.1)
RBC # FLD: 14 % — SIGNIFICANT CHANGE UP (ref 11.5–14.5)
SODIUM SERPL-SCNC: 143 MMOL/L — SIGNIFICANT CHANGE UP (ref 135–146)
SPECIMEN SOURCE: SIGNIFICANT CHANGE UP
SPECIMEN SOURCE: SIGNIFICANT CHANGE UP
WBC # BLD: 6.95 K/UL — SIGNIFICANT CHANGE UP (ref 4.8–10.8)
WBC # FLD AUTO: 6.95 K/UL — SIGNIFICANT CHANGE UP (ref 4.8–10.8)

## 2023-08-14 PROCEDURE — 99233 SBSQ HOSP IP/OBS HIGH 50: CPT

## 2023-08-14 PROCEDURE — 93010 ELECTROCARDIOGRAM REPORT: CPT

## 2023-08-14 PROCEDURE — 99233 SBSQ HOSP IP/OBS HIGH 50: CPT | Mod: 95

## 2023-08-14 RX ORDER — DIVALPROEX SODIUM 500 MG/1
250 TABLET, DELAYED RELEASE ORAL
Refills: 0 | Status: DISCONTINUED | OUTPATIENT
Start: 2023-08-14 | End: 2023-08-16

## 2023-08-14 RX ORDER — LANOLIN ALCOHOL/MO/W.PET/CERES
5 CREAM (GRAM) TOPICAL AT BEDTIME
Refills: 0 | Status: DISCONTINUED | OUTPATIENT
Start: 2023-08-14 | End: 2023-08-16

## 2023-08-14 RX ADMIN — Medication 2: at 12:45

## 2023-08-14 RX ADMIN — Medication 10 UNIT(S): at 17:54

## 2023-08-14 RX ADMIN — LACOSAMIDE 100 MILLIGRAM(S): 50 TABLET ORAL at 06:06

## 2023-08-14 RX ADMIN — LEVETIRACETAM 400 MILLIGRAM(S): 250 TABLET, FILM COATED ORAL at 06:06

## 2023-08-14 RX ADMIN — PIPERACILLIN AND TAZOBACTAM 25 GRAM(S): 4; .5 INJECTION, POWDER, LYOPHILIZED, FOR SOLUTION INTRAVENOUS at 06:07

## 2023-08-14 RX ADMIN — Medication 2: at 08:52

## 2023-08-14 RX ADMIN — LOSARTAN POTASSIUM 50 MILLIGRAM(S): 100 TABLET, FILM COATED ORAL at 06:08

## 2023-08-14 RX ADMIN — POLYETHYLENE GLYCOL 3350 17 GRAM(S): 17 POWDER, FOR SOLUTION ORAL at 17:28

## 2023-08-14 RX ADMIN — CHLORHEXIDINE GLUCONATE 1 APPLICATION(S): 213 SOLUTION TOPICAL at 11:59

## 2023-08-14 RX ADMIN — PANTOPRAZOLE SODIUM 40 MILLIGRAM(S): 20 TABLET, DELAYED RELEASE ORAL at 11:54

## 2023-08-14 RX ADMIN — Medication 5 MILLIGRAM(S): at 21:17

## 2023-08-14 RX ADMIN — Medication 1 APPLICATION(S): at 17:28

## 2023-08-14 RX ADMIN — INSULIN GLARGINE 25 UNIT(S): 100 INJECTION, SOLUTION SUBCUTANEOUS at 11:53

## 2023-08-14 RX ADMIN — Medication 2: at 17:54

## 2023-08-14 RX ADMIN — ENOXAPARIN SODIUM 40 MILLIGRAM(S): 100 INJECTION SUBCUTANEOUS at 17:27

## 2023-08-14 RX ADMIN — Medication 1 APPLICATION(S): at 06:08

## 2023-08-14 RX ADMIN — SENNA PLUS 2 TABLET(S): 8.6 TABLET ORAL at 21:17

## 2023-08-14 RX ADMIN — Medication 10 UNIT(S): at 12:46

## 2023-08-14 RX ADMIN — Medication 60 MILLIGRAM(S): at 06:08

## 2023-08-14 RX ADMIN — LEVETIRACETAM 400 MILLIGRAM(S): 250 TABLET, FILM COATED ORAL at 18:06

## 2023-08-14 RX ADMIN — Medication 81 MILLIGRAM(S): at 14:18

## 2023-08-14 RX ADMIN — Medication 10 UNIT(S): at 08:53

## 2023-08-14 RX ADMIN — CHLORHEXIDINE GLUCONATE 15 MILLILITER(S): 213 SOLUTION TOPICAL at 06:08

## 2023-08-14 RX ADMIN — CHLORHEXIDINE GLUCONATE 15 MILLILITER(S): 213 SOLUTION TOPICAL at 17:26

## 2023-08-14 NOTE — BH CONSULTATION LIAISON PROGRESS NOTE - NSBHASSESSMENTFT_PSY_ALL_CORE
73-year-old male, domiciled with his current wife of 12 years, no dependents, retired, PMH of DM, HTN, HLD, BPH, GERD, primary seizure disorder, past psychiatric history per chart of Bipolar disorder, no known inpatient psychiatric hospitalizations, no known history of suicide attempts, no known current outpatient psychiatrist, no known past substance use history although per collateral concerns for opioid abuse, admitted to medicine for altered mental status. Psychiatry consulted for mental health evaluation.    According to the collateral given by his daughter, ex wife and current wife to [facility] team yesterday, the  pt has hx of outpatient treatment in the 1980s for schizophrenia; no known psychiatric hospitalizations or   suicidality; and in the 13 years of being  to his current wife he has not been psychotic and she did not   give a hx of any current psychiatric treatment. she added she believes he has been abusing opioids. prior to presentation   he had an abrupt change in mental status with lethargy.    The presentation is therefore consistent with altered mental status/delirium, rather than a psychiatric disorder such as bipolar   or schizophrenia. Medical conditions and /or drug abuse/withdrawal are possibly contributing.     Recommend the medical team work to identify and treat the underlying cause of altered mental status, ie infectious/metabolic/neurological etc. .     For agitation, would recommend if nonpharmacologic interventions are not effective,  1. as qtc>500, depakote 125-250mg po/IV bid prn agitation ; lorazepam 1-2mg po/im q6h prn severe agitation  2. if repeak ekg qtc <500, may use haldol 1-2mg po /IM q6hr prn agitation   Patient is currently at high risk for delirium.  3. consider using melatonin 5mg po qhs for stabilization of sleep/wake cycle.   4. tx of opioid w/d as needed     Please follow the following precautions:  - Avoid use of anticholinergic, antihistaminergic, benzodiazepine and opioid medications as these can be deliriogenic  - Assess for pain and provide analgesia as needed  - Maintain normal sleep-wake cycle: daytime awake / night-time asleep, light/dark in room  - Encourage orientation to location, date, individuals  - Mobilize as tolerated during the day (i.e. transfer to chair / seated position even if unable to ambulate otherwise)  - Minimize excessive noise  - Provide daily orientation  - Ensure access to any sensory aides used prior to admission (i.e. glasses, hearing aids etc)  - Ensure communication with caregivers and HCPs as possible    Of note, treatment of delirium involves treating the original etiology. Antipsychotics can be used for behavioral disturbances that pose a danger to patient or others, when benefit outweighs risks.

## 2023-08-14 NOTE — PROGRESS NOTE ADULT - SUBJECTIVE AND OBJECTIVE BOX
24H events:    Patient is a 73y old Male who presents with a chief complaint of Altered Mental Status and Hyperglycemia (14 Aug 2023 09:48)    Primary diagnosis of Acute respiratory failure with hypoxia      Today is hospital day 7d. This morning patient was seen and examined at bedside, pt is not oriented to place and time, confused.  pt was agitated overnight        PAST MEDICAL & SURGICAL HISTORY  DM (diabetes mellitus)  Type 2, 12/27/18 hgb aic  11.2    HTN (hypertension)    Dyslipidemia    Diabetic foot ulcer    Depression    GERD (gastroesophageal reflux disease)    Neuropathy, diabetic    Toe amputation status, right  (2008)    History of amputation of toe  LT -partial 1st toe      SOCIAL HISTORY:  Social History:      ALLERGIES:  No Known Allergies    MEDICATIONS:  aspirin  chewable 81 milliGRAM(s) Oral daily  chlorhexidine 0.12% Liquid 15 milliLiter(s) Oral Mucosa two times a day  chlorhexidine 2% Cloths 1 Application(s) Topical daily  enoxaparin Injectable 40 milliGRAM(s) SubCutaneous every 24 hours  insulin glargine Injectable (LANTUS) 25 Unit(s) SubCutaneous every morning  insulin lispro (ADMELOG) corrective regimen sliding scale   SubCutaneous three times a day before meals  insulin lispro Injectable (ADMELOG) 10 Unit(s) SubCutaneous before breakfast  insulin lispro Injectable (ADMELOG) 10 Unit(s) SubCutaneous before lunch  insulin lispro Injectable (ADMELOG) 10 Unit(s) SubCutaneous before dinner  levETIRAcetam  IVPB 1000 milliGRAM(s) IV Intermittent every 12 hours  losartan 50 milliGRAM(s) Oral daily  melatonin 5 milliGRAM(s) Oral at bedtime  NIFEdipine XL 60 milliGRAM(s) Oral daily  pantoprazole  Injectable 40 milliGRAM(s) IV Push daily  polyethylene glycol 3350 17 Gram(s) Oral two times a day  senna 2 Tablet(s) Oral at bedtime  silver sulfADIAZINE 1% Cream 1 Application(s) Topical two times a day    MEDICATIONS  (PRN):  cloNIDine 0.1 milliGRAM(s) Oral every 6 hours PRN Autonomic instability  divalproex  milliGRAM(s) Oral two times a day PRN mild- mod agitation  LORazepam   Injectable 2 milliGRAM(s) IntraMuscular every 6 hours PRN severe agitation    VITALS:   T(F): 98.1  HR: 116  BP: 176/73  RR: 18  SpO2: 99%    PHYSICAL EXAM:  GENERAL:   (  ) NAD, lying in bed comfortably     (  ) obtunded     (  ) lethargic     (  ) somnolent   (X)confused/disoriented    HEAD:   ( X ) Atraumatic     (  ) hematoma     (  ) laceration (specify location:       )     NECK:  (X  ) Supple     (  ) neck stiffness     (  ) nuchal rigidity     (  )  no JVD     (  ) JVD present ( -- cm)    HEART:  Rate -->     ( X ) normal rate     (  ) bradycardic     (  ) tachycardic  Rhythm -->     ( X ) regular     (  ) regularly irregular     (  ) irregularly irregular  Murmurs -->     ( X ) normal s1s2     (  ) systolic murmur     (  ) diastolic murmur     (  ) continuous murmur      (  ) S3 present     (  ) S4 present    LUNGS:   (  )Unlabored respirations     (  ) tachypnea  ( X ) B/L air entry     (  ) decreased breath sounds in:  (location     )    ( X ) no adventitious sound     (  ) crackles     (  ) wheezing      (  ) rhonchi      (specify location:       )  (  ) chest wall tenderness (specify location:       )    ABDOMEN:   (X  ) Soft     (  ) tense   |   (  ) nondistended     (  ) distended   |   ( X ) +BS     (  ) hypoactive bowel sounds     (  ) hyperactive bowel sounds  (X  ) nontender     (  ) RUQ tenderness     (  ) RLQ tenderness     (  ) LLQ tenderness     (  ) epigastric tenderness     (  ) diffuse tenderness  (  ) Splenomegaly      (  ) Hepatomegaly      (  ) Jaundice     (  ) ecchymosis     EXTREMITIES:  ( X ) Normal     (  ) Rash     (  ) ecchymosis     (  ) varicose veins      (  ) pitting edema     (  ) non-pitting edema   (  ) ulceration     (  ) gangrene:     (location:     )    NERVOUS SYSTEM:    (X  ) A&Ox1     (  ) confused     (  ) lethargic  CN II-XII:     (  ) Intact     (  ) deficits found     (Specify:     )   Upper extremities:     (  ) no sensorimotor deficits     (  ) weakness     (  ) loss of proprioception/vibration     (  ) loss of touch/temperature (specify:    )  Lower extremities:     (  ) no sensorimotor deficits     (  ) weakness     (  ) loss of proprioception/vibration     (  ) loss of touch/temperature (specify:    )    SKIN:   (X  ) No rashes or lesions     (  ) maculopapular rash     (  ) pustules     (  ) vesicles     (  ) ulcer     (  ) ecchymosis     (specify location:     )    AMPAC score:    (  ) Indwelling Basilio Catheter:   Date insterted:    Reason (  ) Critical illness     (  ) urinary retention    (  ) Accurate Ins/Outs Monitoring     (  ) CMO patient    (  ) Central Line:   Date inserted:  Location: (  ) Right IJ     (  ) Left IJ     (  ) Right Fem     (  ) Left Fem    (  ) SPC        (  ) pigtail       (  ) PEG tube       (  ) colostomy       (  ) jejunostomy  (  ) U-Dall    LABS:                        11.9   8.35  )-----------( 239      ( 13 Aug 2023 07:32 )             36.2     08-13    144  |  104  |  16  ----------------------------<  374<H>  4.6   |  22  |  1.2    Ca    9.2      13 Aug 2023 07:32  Mg     2.0     08-13    TPro  6.2  /  Alb  3.6  /  TBili  0.9  /  DBili  x   /  AST  18  /  ALT  17  /  AlkPhos  88  08-13      Urinalysis Basic - ( 13 Aug 2023 07:32 )    Color: x / Appearance: x / SG: x / pH: x  Gluc: 374 mg/dL / Ketone: x  / Bili: x / Urobili: x   Blood: x / Protein: x / Nitrite: x   Leuk Esterase: x / RBC: x / WBC x   Sq Epi: x / Non Sq Epi: x / Bacteria: x                RADIOLOGY:  CT Head No Cont (08.09.23)  IMPRESSION:    No acute intracranial pathology.

## 2023-08-14 NOTE — BH CONSULTATION LIAISON PROGRESS NOTE - CURRENT MEDICATION
MEDICATIONS  (STANDING):  aspirin  chewable 81 milliGRAM(s) Oral daily  chlorhexidine 0.12% Liquid 15 milliLiter(s) Oral Mucosa two times a day  chlorhexidine 2% Cloths 1 Application(s) Topical daily  enoxaparin Injectable 40 milliGRAM(s) SubCutaneous every 24 hours  insulin glargine Injectable (LANTUS) 25 Unit(s) SubCutaneous every morning  insulin lispro (ADMELOG) corrective regimen sliding scale   SubCutaneous three times a day before meals  insulin lispro Injectable (ADMELOG) 10 Unit(s) SubCutaneous before breakfast  insulin lispro Injectable (ADMELOG) 10 Unit(s) SubCutaneous before lunch  insulin lispro Injectable (ADMELOG) 10 Unit(s) SubCutaneous before dinner  levETIRAcetam  IVPB 1000 milliGRAM(s) IV Intermittent every 12 hours  losartan 50 milliGRAM(s) Oral daily  NIFEdipine XL 60 milliGRAM(s) Oral daily  pantoprazole  Injectable 40 milliGRAM(s) IV Push daily  polyethylene glycol 3350 17 Gram(s) Oral two times a day  senna 2 Tablet(s) Oral at bedtime  silver sulfADIAZINE 1% Cream 1 Application(s) Topical two times a day    MEDICATIONS  (PRN):  cloNIDine 0.1 milliGRAM(s) Oral every 6 hours PRN Autonomic instability

## 2023-08-14 NOTE — BH CONSULTATION LIAISON PROGRESS NOTE - NSBHFUPINTERVALHXFT_PSY_A_CORE
pt seen signout provided to me by weekend team chart reviewed.     pt is lethargic, not responding to verbal or tactil stimuli, unable to participate in interview.     per nursing/pca pt has been sleeping all morning and was agitated last night.

## 2023-08-14 NOTE — PROGRESS NOTE ADULT - ASSESSMENT
73M PMHx DM, HTN, HLD, h/o seizure disorders, h/o Bipolar Disorder, h/o BPH, GERD and h/o OM s/p multiple toe amputations presents to the ED for altered mental status/Seizure like activity. Patient was found to be severely hyperglycemic, intubated in ED for AMS, and admitted to MICU for DKA/HHS.     #Acute Metabolic Encephalopathy - improved, still confused  -Seizure like activity with a a history of seizure disorder  - s/p extubation, now on 2L NC, AAO x2 today . Walked with PT  - VEEG : Moderate generalized slowing, mild to moderate bifrontal focal slowing with small number of diffusely expressed triphasic waves  - CTH without acute process  - Utox neg   - s/p neurology eval, c/w Keppra 1000 mg bid and Vimpat 100 mg bid    - c/w seizure precautions  - PT/OT  - no lubin, has a collection bag  - psyche eval. recs sodium valproate, lorazepam and melatonin, avoid haldol since prolong QTC    #Suspected Aspiration PNA  - clinically improving on NC  - blood cx NGTD, Procal 2.73, wound cx NGTD  - completed zosyn for 5 day (on 8/14)    #DKA/HHS - resolved  #History of DM II  - s/p insulin drip  - c/w basal bolus insulin, monitor FS AC and HS, adjusted today     #Upper Extremity bullae, likely due to edema and recent IV  - seen by burn, derm, bedside debridement performed on RUE  - wound cx NGTD     #HTN   - Bp better on nifedipine 60mg daily and losartan 50mg daily  - if persistently elevated, can titrate up     #Thrombocytopenia - resolved, mayi 117  - DIC, HIT panels  negative  - peripheral smear  - cw  prophylactic Lovenox     #h/o OM s/p multiple toe amputations  -recently admitted for MSSA bacteremia s/p course of antibiotics, to be completed 8/7  -recent blood cx NGTD     #hx of bipolar disorder  - Cont zyprexa 5mg qD    DVT PPX Lovenox    #Progress Note Handoff  Pending (specify): Continue monitoring mental status,   Disposition: TBD

## 2023-08-14 NOTE — BH CONSULTATION LIAISON PROGRESS NOTE - NSBHCHARTREVIEWVS_PSY_A_CORE FT
Vital Signs Last 24 Hrs  T(C): 36.4 (14 Aug 2023 04:57), Max: 37 (13 Aug 2023 19:17)  T(F): 97.6 (14 Aug 2023 04:57), Max: 98.6 (13 Aug 2023 19:17)  HR: 96 (14 Aug 2023 04:57) (65 - 96)  BP: 153/84 (14 Aug 2023 04:57) (113/54 - 153/84)  BP(mean): 104 (14 Aug 2023 04:57) (104 - 104)  RR: 18 (14 Aug 2023 04:57) (18 - 18)  SpO2: 100% (14 Aug 2023 10:03) (97% - 100%)    Parameters below as of 14 Aug 2023 10:03  Patient On (Oxygen Delivery Method): nasal cannula  O2 Flow (L/min): 3

## 2023-08-14 NOTE — SWALLOW BEDSIDE ASSESSMENT ADULT - COMMENTS
As per wife, pt was eating regular solids and thin liquids at home. Wife denies any hx of dysphagia.

## 2023-08-15 ENCOUNTER — TRANSCRIPTION ENCOUNTER (OUTPATIENT)
Age: 73
End: 2023-08-15

## 2023-08-15 LAB
ALBUMIN SERPL ELPH-MCNC: 3.1 G/DL — LOW (ref 3.5–5.2)
ALP SERPL-CCNC: 68 U/L — SIGNIFICANT CHANGE UP (ref 30–115)
ALT FLD-CCNC: 15 U/L — SIGNIFICANT CHANGE UP (ref 0–41)
ANION GAP SERPL CALC-SCNC: 7 MMOL/L — SIGNIFICANT CHANGE UP (ref 7–14)
AST SERPL-CCNC: 15 U/L — SIGNIFICANT CHANGE UP (ref 0–41)
BASOPHILS # BLD AUTO: 0.03 K/UL — SIGNIFICANT CHANGE UP (ref 0–0.2)
BASOPHILS NFR BLD AUTO: 0.5 % — SIGNIFICANT CHANGE UP (ref 0–1)
BILIRUB SERPL-MCNC: 0.4 MG/DL — SIGNIFICANT CHANGE UP (ref 0.2–1.2)
BUN SERPL-MCNC: 20 MG/DL — SIGNIFICANT CHANGE UP (ref 10–20)
CALCIUM SERPL-MCNC: 8.4 MG/DL — SIGNIFICANT CHANGE UP (ref 8.4–10.5)
CHLORIDE SERPL-SCNC: 108 MMOL/L — SIGNIFICANT CHANGE UP (ref 98–110)
CO2 SERPL-SCNC: 28 MMOL/L — SIGNIFICANT CHANGE UP (ref 17–32)
CREAT SERPL-MCNC: 1 MG/DL — SIGNIFICANT CHANGE UP (ref 0.7–1.5)
EGFR: 78 ML/MIN/1.73M2 — SIGNIFICANT CHANGE UP
EOSINOPHIL # BLD AUTO: 0.1 K/UL — SIGNIFICANT CHANGE UP (ref 0–0.7)
EOSINOPHIL NFR BLD AUTO: 1.8 % — SIGNIFICANT CHANGE UP (ref 0–8)
GLUCOSE BLDC GLUCOMTR-MCNC: 108 MG/DL — HIGH (ref 70–99)
GLUCOSE BLDC GLUCOMTR-MCNC: 146 MG/DL — HIGH (ref 70–99)
GLUCOSE BLDC GLUCOMTR-MCNC: 192 MG/DL — HIGH (ref 70–99)
GLUCOSE BLDC GLUCOMTR-MCNC: 204 MG/DL — HIGH (ref 70–99)
GLUCOSE SERPL-MCNC: 224 MG/DL — HIGH (ref 70–99)
HCT VFR BLD CALC: 33 % — LOW (ref 42–52)
HGB BLD-MCNC: 10.7 G/DL — LOW (ref 14–18)
IMM GRANULOCYTES NFR BLD AUTO: 2.4 % — HIGH (ref 0.1–0.3)
LEVETIRACETAM SERPL-MCNC: 24.2 UG/ML — SIGNIFICANT CHANGE UP (ref 10–40)
LYMPHOCYTES # BLD AUTO: 1.03 K/UL — LOW (ref 1.2–3.4)
LYMPHOCYTES # BLD AUTO: 18.7 % — LOW (ref 20.5–51.1)
MAGNESIUM SERPL-MCNC: 2.2 MG/DL — SIGNIFICANT CHANGE UP (ref 1.8–2.4)
MCHC RBC-ENTMCNC: 29.6 PG — SIGNIFICANT CHANGE UP (ref 27–31)
MCHC RBC-ENTMCNC: 32.4 G/DL — SIGNIFICANT CHANGE UP (ref 32–37)
MCV RBC AUTO: 91.2 FL — SIGNIFICANT CHANGE UP (ref 80–94)
MONOCYTES # BLD AUTO: 0.6 K/UL — SIGNIFICANT CHANGE UP (ref 0.1–0.6)
MONOCYTES NFR BLD AUTO: 10.9 % — HIGH (ref 1.7–9.3)
NEUTROPHILS # BLD AUTO: 3.62 K/UL — SIGNIFICANT CHANGE UP (ref 1.4–6.5)
NEUTROPHILS NFR BLD AUTO: 65.7 % — SIGNIFICANT CHANGE UP (ref 42.2–75.2)
NRBC # BLD: 0 /100 WBCS — SIGNIFICANT CHANGE UP (ref 0–0)
PHOSPHATE SERPL-MCNC: 2.7 MG/DL — SIGNIFICANT CHANGE UP (ref 2.1–4.9)
PLATELET # BLD AUTO: 238 K/UL — SIGNIFICANT CHANGE UP (ref 130–400)
PMV BLD: 10.7 FL — HIGH (ref 7.4–10.4)
POTASSIUM SERPL-MCNC: 4.1 MMOL/L — SIGNIFICANT CHANGE UP (ref 3.5–5)
POTASSIUM SERPL-SCNC: 4.1 MMOL/L — SIGNIFICANT CHANGE UP (ref 3.5–5)
PROT SERPL-MCNC: 4.9 G/DL — LOW (ref 6–8)
RBC # BLD: 3.62 M/UL — LOW (ref 4.7–6.1)
RBC # FLD: 13.6 % — SIGNIFICANT CHANGE UP (ref 11.5–14.5)
SODIUM SERPL-SCNC: 143 MMOL/L — SIGNIFICANT CHANGE UP (ref 135–146)
WBC # BLD: 5.51 K/UL — SIGNIFICANT CHANGE UP (ref 4.8–10.8)
WBC # FLD AUTO: 5.51 K/UL — SIGNIFICANT CHANGE UP (ref 4.8–10.8)

## 2023-08-15 PROCEDURE — 99233 SBSQ HOSP IP/OBS HIGH 50: CPT

## 2023-08-15 RX ORDER — INSULIN LISPRO 100/ML
7 VIAL (ML) SUBCUTANEOUS
Refills: 0 | Status: DISCONTINUED | OUTPATIENT
Start: 2023-08-15 | End: 2023-08-16

## 2023-08-15 RX ORDER — INSULIN LISPRO 100/ML
VIAL (ML) SUBCUTANEOUS
Refills: 0 | Status: DISCONTINUED | OUTPATIENT
Start: 2023-08-15 | End: 2023-08-16

## 2023-08-15 RX ORDER — SODIUM CHLORIDE 9 MG/ML
1000 INJECTION, SOLUTION INTRAVENOUS
Refills: 0 | Status: DISCONTINUED | OUTPATIENT
Start: 2023-08-15 | End: 2023-08-16

## 2023-08-15 RX ORDER — DEXTROSE 50 % IN WATER 50 %
15 SYRINGE (ML) INTRAVENOUS ONCE
Refills: 0 | Status: DISCONTINUED | OUTPATIENT
Start: 2023-08-15 | End: 2023-08-16

## 2023-08-15 RX ORDER — DEXTROSE 50 % IN WATER 50 %
25 SYRINGE (ML) INTRAVENOUS ONCE
Refills: 0 | Status: DISCONTINUED | OUTPATIENT
Start: 2023-08-15 | End: 2023-08-16

## 2023-08-15 RX ORDER — GLUCAGON INJECTION, SOLUTION 0.5 MG/.1ML
1 INJECTION, SOLUTION SUBCUTANEOUS ONCE
Refills: 0 | Status: DISCONTINUED | OUTPATIENT
Start: 2023-08-15 | End: 2023-08-16

## 2023-08-15 RX ORDER — DEXTROSE 50 % IN WATER 50 %
12.5 SYRINGE (ML) INTRAVENOUS ONCE
Refills: 0 | Status: DISCONTINUED | OUTPATIENT
Start: 2023-08-15 | End: 2023-08-16

## 2023-08-15 RX ADMIN — LEVETIRACETAM 400 MILLIGRAM(S): 250 TABLET, FILM COATED ORAL at 06:06

## 2023-08-15 RX ADMIN — ENOXAPARIN SODIUM 40 MILLIGRAM(S): 100 INJECTION SUBCUTANEOUS at 17:47

## 2023-08-15 RX ADMIN — Medication 1: at 17:48

## 2023-08-15 RX ADMIN — Medication 4: at 08:37

## 2023-08-15 RX ADMIN — LOSARTAN POTASSIUM 50 MILLIGRAM(S): 100 TABLET, FILM COATED ORAL at 05:15

## 2023-08-15 RX ADMIN — Medication 1 APPLICATION(S): at 17:38

## 2023-08-15 RX ADMIN — PANTOPRAZOLE SODIUM 40 MILLIGRAM(S): 20 TABLET, DELAYED RELEASE ORAL at 11:57

## 2023-08-15 RX ADMIN — Medication 81 MILLIGRAM(S): at 11:57

## 2023-08-15 RX ADMIN — INSULIN GLARGINE 25 UNIT(S): 100 INJECTION, SOLUTION SUBCUTANEOUS at 11:58

## 2023-08-15 RX ADMIN — Medication 5 MILLIGRAM(S): at 21:39

## 2023-08-15 RX ADMIN — Medication 10 UNIT(S): at 08:38

## 2023-08-15 RX ADMIN — CHLORHEXIDINE GLUCONATE 15 MILLILITER(S): 213 SOLUTION TOPICAL at 05:15

## 2023-08-15 RX ADMIN — CHLORHEXIDINE GLUCONATE 1 APPLICATION(S): 213 SOLUTION TOPICAL at 11:58

## 2023-08-15 RX ADMIN — Medication 7 UNIT(S): at 17:47

## 2023-08-15 RX ADMIN — Medication 60 MILLIGRAM(S): at 05:15

## 2023-08-15 RX ADMIN — POLYETHYLENE GLYCOL 3350 17 GRAM(S): 17 POWDER, FOR SOLUTION ORAL at 18:01

## 2023-08-15 RX ADMIN — CHLORHEXIDINE GLUCONATE 15 MILLILITER(S): 213 SOLUTION TOPICAL at 17:39

## 2023-08-15 RX ADMIN — LEVETIRACETAM 400 MILLIGRAM(S): 250 TABLET, FILM COATED ORAL at 17:47

## 2023-08-15 RX ADMIN — POLYETHYLENE GLYCOL 3350 17 GRAM(S): 17 POWDER, FOR SOLUTION ORAL at 05:14

## 2023-08-15 RX ADMIN — Medication 1 APPLICATION(S): at 05:17

## 2023-08-15 NOTE — SWALLOW BEDSIDE ASSESSMENT ADULT - SLP PERTINENT HISTORY OF CURRENT PROBLEM
74yo Male with PMHx DM, HTN, HLD, h/o seizure disorders, h/o Bipolar Disorder, h/o BPH, GERD and h/o OM s/p multiple toe amputations. Presents to the ED for altered mental status. Admitted to ICU s/p intubation for airway protection 2' AMS and management of HHS. Intubated 8/7-8/9. CXR 8/10/23: Bibasilar opacities. R/o seizures most likely related to HOS to hyperglycemia vs non-compliance, polysubstance use. S/P extubation 8/7/23-8/9/23.
72yo Male with PMHx DM, HTN, HLD, h/o seizure disorders, h/o Bipolar Disorder, h/o BPH, GERD and h/o OM s/p multiple toe amputations. Presents to the ED for altered mental status. Admitted to ICU s/p intubation for airway protection 2' AMS and management of HHS. Intubated 8/7-8/9. CXR: Bibasilar opacities/effusions. R/o seizures most likely related to HOS to hyperglycemia vs non-compliance, polysubstance use.
74yo Male with PMHx DM, HTN, HLD, h/o seizure disorders, h/o Bipolar Disorder, h/o BPH, GERD and h/o OM s/p multiple toe amputations. Presents to the ED for altered mental status. Admitted to ICU s/p intubation for airway protection 2' AMS and management of HHS. Intubated 8/7-8/9. CXR 8/10/23: Bibasilar opacities. R/o seizures most likely related to HOS to hyperglycemia vs non-compliance, polysubstance use. S/P extubation 8/7/23-8/9/23.
74yo Male with PMHx DM, HTN, HLD, h/o seizure disorders, h/o Bipolar Disorder, h/o BPH, GERD and h/o OM s/p multiple toe amputations. Presents to the ED for altered mental status. Admitted to ICU s/p intubation for airway protection 2' AMS and management of HHS. Intubated 8/7-8/9. CXR 8/10/23: Bibasilar opacities. R/o seizures most likely related to HOS to hyperglycemia vs non-compliance, polysubstance use.
74yo Male with PMHx DM, HTN, HLD, h/o seizure disorders, h/o Bipolar Disorder, h/o BPH, GERD and h/o OM s/p multiple toe amputations. Presents to the ED for altered mental status. Admitted to ICU s/p intubation for airway protection 2' AMS and management of HHS. Intubated 8/7-8/9. CXR 8/10/23: Bibasilar opacities. R/o seizures most likely related to HOS to hyperglycemia vs non-compliance, polysubstance use. S/P extubation 8/7/23-8/9/23.

## 2023-08-15 NOTE — SWALLOW BEDSIDE ASSESSMENT ADULT - CONSISTENCIES ADMINISTERED
thin liquid/pureed
thin liquid/soft & bite-sized/regular solid
thin liquid/pureed
thin liquid/minced & moist/soft & bite-sized

## 2023-08-15 NOTE — DISCHARGE NOTE NURSING/CASE MANAGEMENT/SOCIAL WORK - PATIENT PORTAL LINK FT
You can access the FollowMyHealth Patient Portal offered by [facility] by registering at the following website: [email or web address]. By joining [employer]’s FollowMyHealth portal, you will also be able to view your health information using other applications (apps) compatible with our system.

## 2023-08-15 NOTE — SWALLOW BEDSIDE ASSESSMENT ADULT - SPECIFY REASON(S)
Dysphagia evaluation post extubation
Dysphagia f/u
Dysphagia evaluation post extubation

## 2023-08-15 NOTE — PROGRESS NOTE ADULT - ASSESSMENT
73M PMHx DM, HTN, HLD, h/o seizure disorders, h/o Bipolar Disorder, h/o BPH, GERD and h/o OM s/p multiple toe amputations presents to the ED for altered mental status/Seizure like activity. Patient was found to be severely hyperglycemic, intubated in ED for AMS, and admitted to MICU for DKA/HHS.     #Acute Metabolic Encephalopathy - improved, still confused  - Seizure like activity with a a history of seizure disorder  - s/p extubation, now on 2L NC, AAO x3 today . Walked with PT  - VEEG : Moderate generalized slowing, mild to moderate bifrontal focal slowing with small number of diffusely expressed triphasic waves  - CTH without acute process  - Utox neg   - s/p neurology eval, c/w Keppra 1000 mg bid and Vimpat 100 mg bid    - c/w seizure precautions  - PT/OT  - no lubin, has a collection bag  - psyche eval. recs sodium valproate, lorazepam and melatonin, avoid haldol since prolong QTC    #Suspected Aspiration PNA  - clinically improving on NC  - blood cx NGTD, Procal 2.73, wound cx NGTD  - completed zosyn for 5 day (on 8/14)    #DKA/HHS - resolved  #History of DM II  - s/p insulin drip  - c/w basal bolus insulin, monitor FS AC and HS, adjusted today     #Upper Extremity bullae, likely due to edema and recent IV  - seen by burn, derm, bedside debridement performed on RUE  - wound cx NGTD     #HTN   - Bp better on nifedipine 60mg daily and losartan 50mg daily  - if persistently elevated, can titrate up     #Thrombocytopenia - resolved, mayi 117  - DIC, HIT panels  negative  - peripheral smear  - cw  prophylactic Lovenox     #h/o OM s/p multiple toe amputations  -recently admitted for MSSA bacteremia s/p course of antibiotics, to be completed 8/7  -recent blood cx NGTD     #hx of bipolar disorder  - Cont zyprexa 5mg qD    DVT PPX Lovenox    #Progress Note Handoff  Pending (specify): Continue monitoring mental status,   Disposition: TBD

## 2023-08-15 NOTE — PATIENT PROFILE ADULT - IS THERE A SUSPICION OF ABUSE/NEGLIGENCE?
[4. Prevent psychological harm to patient or others] : Reason - Prevent psychological harm to patient or others
no

## 2023-08-15 NOTE — SWALLOW BEDSIDE ASSESSMENT ADULT - SLP GENERAL OBSERVATIONS
Pt found laying in bed a&ox2. Pt presents w/ confusion to present time but able to recall past history.
Pt found laying in bed a&ox2. Pt able to follow simple commands. Pt presents w/ poor presentation to PO trials. SLP provided max verbal and visual cues.
Pt found in bed a&ox2 w/ wife and daughters at bedside. Pt presents w/ confusion however better presentation then last evaluation. Wife & daughters state he is much more awake today then he has been.
Pt found in bed a&ox3. Pt denies any hx of dysphagia and states he is feeling much better.
Pt. received lethargic, confused, min verbalizations, poor mental status, 4L NC.

## 2023-08-15 NOTE — PROGRESS NOTE ADULT - ATTENDING COMMENTS
73M PMHx DM, HTN, HLD, h/o seizure disorders, h/o Bipolar Disorder, h/o BPH, GERD and h/o OM s/p multiple toe amputations presents to the ED for altered mental status/Seizure like activity. Patient was found to be severely hyperglycemic, intubated in ED for AMS, and admitted to MICU for DKA/HHS.     #Acute Metabolic Encephalopathy - much improved now   #Seizure like activity with a a history of seizure disorder  - s/p extubation, now on 2L NC, AAO x2 earlier > Now AAOx3. Walked with PT (8/15)  - VEEG : Moderate generalized slowing, mild to moderate bifrontal focal slowing with small number of diffusely expressed triphasic waves  - CTH without acute process  - Utox neg   - s/p neurology eval, c/w Keppra 1000 mg bid and Vimpat 100 mg bid    - c/w seizure precautions  - PT/OT  - no lubin, has a collection bag  8/14: Zosyn ends 8/14. diet advanced per ST. Still very emotional when talking. O x2. Per Psych : watch for opioid w/d and treat for any medical delirium. CST. Maybe repeat Psych eval 8/16. Hold Zyprexa for now. agitations meds per psych  8/15: Per family patient is mentally back to baseline. Most likely presenting complants were all metabolic enceph/delirium and no underlying psych conditions active apparently right now. discussed with psych. No more zyprexa (it was started here, patient was not taking at home). D/c tomorrow with outpatient psych f/u    #RUE swelling: likely from IV infiltration. Open blisteres noted. pending Wound care RN, RUE duplex    #Suspected Aspiration PNA  - clinically improving on NC  - blood cx NGTD, Procal 2.73, wound cx NGTD  - s/p zosyn for 5 day     #DKA/HHS - resolved  #History of DM II  - s/p insulin drip  - c/w basal bolus insulin, monitor FS AC and HS, adjusted today : lantus same 25, lispro 7 TID AC.     #Upper Extremity bullae, likely due to edema and recent IV  - seen by burn, derm, bedside debridement performed on RUE  - wound cx NGTD     #HTN   - Bp better on nifedipine 60mg daily and losartan 50mg daily  - if persistently elevated, can titrate up     #Thrombocytopenia - resolved, mayi 117  - DIC, HIT panels  negative  - peripheral smear  - cw  prophylactic Lovenox     #h/o OM s/p multiple toe amputations  recently admitted for MSSA bacteremia s/p course of antibiotics, to be completed 8/7  recent blood cx NGTD     #hx of bipolar disorder  - No more zyprexa.     DVT PPX Lovenox    #Progress Note Handoff  Pending (specify): likely d/c tomorrow. pending wound care RN.
IMPRESSION:    Acute hypoxemic respiratory failure SP extubation   DKA / HHS - resolved  Possible aspiration treated   Suspected seizure   Ho seizure disorder  HO bipolar disorder  Recently treated MSSA bacteremia secondary to toe OM  TYLER improving   AMS improving     Impression and plan above are my own.
73M PMHx DM, HTN, HLD, h/o seizure disorders, h/o Bipolar Disorder, h/o BPH, GERD and h/o OM s/p multiple toe amputations presents to the ED for altered mental status/Seizure like activity. Patient was found to be severely hyperglycemic, intubated in ED for AMS, and admitted to MICU for DKA/HHS.     #Acute Metabolic Encephalopathy - improved, still confused  #Seizure like activity with a a history of seizure disorder  - s/p extubation, now on 2L NC, AAO x2 today . Walked with PT  - VEEG : Moderate generalized slowing, mild to moderate bifrontal focal slowing with small number of diffusely expressed triphasic waves  - CTH without acute process  - Utox neg   - s/p neurology eval, c/w Keppra 1000 mg bid and Vimpat 100 mg bid    - c/w seizure precautions  - PT/OT  - no lubin, has a collection bag  8/14: Zosyn ends 8/14. diet advanced per ST. Still very emotional when talking. O x2. Per Psych : watch for opioid w/d and treat for any medical delirium. CST. Maybe repeat Psych eval 8/16. Hold Zyprexa for now. agitations meds per psych      #Suspected Aspiration PNA  - clinically improving on NC  - blood cx NGTD, Procal 2.73, wound cx NGTD  - s/p zosyn for 5 day     #DKA/HHS - resolved  #History of DM II  - s/p insulin drip  - c/w basal bolus insulin, monitor FS AC and HS, adjusted today     #Upper Extremity bullae, likely due to edema and recent IV  - seen by burn, derm, bedside debridement performed on RUE  - wound cx NGTD     #HTN   - Bp better on nifedipine 60mg daily and losartan 50mg daily  - if persistently elevated, can titrate up     #Thrombocytopenia - resolved, mayi 117  - DIC, HIT panels  negative  - peripheral smear  - cw  prophylactic Lovenox     #h/o OM s/p multiple toe amputations  recently admitted for MSSA bacteremia s/p course of antibiotics, to be completed 8/7  recent blood cx NGTD     #hx of bipolar disorder  - Cont zyprexa 5mg qD    DVT PPX Lovenox    #Progress Note Handoff  Pending (specify): Continue monitoring mental status, cont IV abx  Disposition: TBD
Seen and examined the patient. No seizure for past 24 hours. Continue current AEDs.  Propofol to Precedex per primary team. VEEG for 24 hours.

## 2023-08-15 NOTE — DISCHARGE NOTE NURSING/CASE MANAGEMENT/SOCIAL WORK - NSDCPEFALRISK_GEN_ALL_CORE
For information on Fall & Injury Prevention, visit: [email or web address] OR  [email or web address] OR  https://www.cdc.gov/steadi/patient.html

## 2023-08-15 NOTE — SWALLOW BEDSIDE ASSESSMENT ADULT - ORAL PREPARATORY PHASE
Within functional limits
reduced labial seal/Reduced oral grading
Within functional limits
Within functional limits

## 2023-08-15 NOTE — SWALLOW BEDSIDE ASSESSMENT ADULT - SWALLOW EVAL: ORAL MUSCULATURE
Generalized weakness/anomalies present
generally intact
generally intact
unable to assess due to poor participation/comprehension

## 2023-08-15 NOTE — SWALLOW BEDSIDE ASSESSMENT ADULT - MODE OF PRESENTATION
cup/self fed
cup/spoon/fed by clinician
1:1 feed/cup/spoon/fed by clinician
1:1 feed/cup/spoon/fed by clinician

## 2023-08-15 NOTE — SWALLOW BEDSIDE ASSESSMENT ADULT - SWALLOW EVAL: DIAGNOSIS
Pt presents w/ no clinical overt s/s of aspiration/penetration while consuming puree/thin liquids. Pt presents w/ deficits (poor labial seal) in the oral prep stage.
Pt presents w/ no clinical overt s/s of aspiration/penetration while consuming puree/thin liquids.
Pt presents w/ no clinical overt s/s of aspiration/penetration while consuming soft & bite sized/thin liquids.
Pt. received with no awareness for feeding situation likely negatively impacted by confusion. PO trials are not appropriate at this time 2' high aspiration risk.
Pt presents w/ no clinical overt s/s of aspiration/penetration while consuming Regular /thin liquids.

## 2023-08-15 NOTE — SWALLOW BEDSIDE ASSESSMENT ADULT - SWALLOW EVAL: RECOMMENDED FEEDING/EATING TECHNIQUES
alternate food with liquid/oral hygiene/position upright (90 degrees)/small sips/bites
check mouth frequently for oral residue/pocketing/crush medication (when feasible)/oral hygiene/position upright (90 degrees)/small sips/bites
alternate food with liquid/oral hygiene/position upright (90 degrees)/small sips/bites
check mouth frequently for oral residue/pocketing/crush medication (when feasible)/oral hygiene/position upright (90 degrees)/small sips/bites

## 2023-08-15 NOTE — SWALLOW BEDSIDE ASSESSMENT ADULT - SWALLOW EVAL: RECOMMENDED DIET
Puree/ Thin liquids
Puree/ Thin liquids
Regular solids w/ thin liquids
NPO with alternate means for nutrition/ hydration.
Soft & Bite sized w/ thin liquids

## 2023-08-15 NOTE — SWALLOW BEDSIDE ASSESSMENT ADULT - NS SPL SWALLOW CLINIC TRIAL FT
Pt presents w/ no overt clinical s/s of aspiration/penetration while consuming soft & bite sized, regular solids and thin liquids.
Pt presents w/ poor presentation to PO trials. Pt presented w/ decreased labial seal ability w/ spoon presentation. SLP provided max verbal and visual cues. Pt presents w/ no clinical overt s/s of aspiration/penetration w/ puree and thin liquids.
Pt presents w/ no clinical overt s/s of aspiration/penetration w/ puree and thin liquids.
Pt presents w/ no overt clinical s/s of aspiration/penetration while consuming soft & bite sized and thin liquids. Pt presents w/ lingual stasis. Pt cleared lingual stasis when implementing alternating bites/sips.

## 2023-08-15 NOTE — PROGRESS NOTE ADULT - SUBJECTIVE AND OBJECTIVE BOX
24H events:    Patient is a 73y old Male who presents with a chief complaint of Altered Mental Status and Hyperglycemia (14 Aug 2023 09:48)    Primary diagnosis of Acute respiratory failure with hypoxia      Today is hospital day 8d. This morning patient was seen and examined at bedside, pt is lying comfortably in bed, oriented to time place and person today, answering questions, wants to walk with the PT  No overnight events        PAST MEDICAL & SURGICAL HISTORY  DM (diabetes mellitus)  Type 2, 12/27/18 hgb aic  11.2    HTN (hypertension)    Dyslipidemia    Diabetic foot ulcer    Depression    GERD (gastroesophageal reflux disease)    Neuropathy, diabetic    Toe amputation status, right  (2008)    History of amputation of toe  LT -partial 1st toe      SOCIAL HISTORY:  Social History:      ALLERGIES:  No Known Allergies    MEDICATIONS:  aspirin  chewable 81 milliGRAM(s) Oral daily  chlorhexidine 0.12% Liquid 15 milliLiter(s) Oral Mucosa two times a day  chlorhexidine 2% Cloths 1 Application(s) Topical daily  enoxaparin Injectable 40 milliGRAM(s) SubCutaneous every 24 hours  insulin glargine Injectable (LANTUS) 25 Unit(s) SubCutaneous every morning  insulin lispro (ADMELOG) corrective regimen sliding scale   SubCutaneous three times a day before meals  insulin lispro Injectable (ADMELOG) 10 Unit(s) SubCutaneous before lunch  insulin lispro Injectable (ADMELOG) 10 Unit(s) SubCutaneous before dinner  insulin lispro Injectable (ADMELOG) 10 Unit(s) SubCutaneous before breakfast  levETIRAcetam  IVPB 1000 milliGRAM(s) IV Intermittent every 12 hours  losartan 50 milliGRAM(s) Oral daily  melatonin 5 milliGRAM(s) Oral at bedtime  NIFEdipine XL 60 milliGRAM(s) Oral daily  pantoprazole  Injectable 40 milliGRAM(s) IV Push daily  polyethylene glycol 3350 17 Gram(s) Oral two times a day  senna 2 Tablet(s) Oral at bedtime  silver sulfADIAZINE 1% Cream 1 Application(s) Topical two times a day    MEDICATIONS  (PRN):  cloNIDine 0.1 milliGRAM(s) Oral every 6 hours PRN Autonomic instability  divalproex  milliGRAM(s) Oral two times a day PRN mild- mod agitation  LORazepam   Injectable 2 milliGRAM(s) IntraMuscular every 6 hours PRN severe agitation    VITALS:     T(C): 35.8   T(F): 96.5   HR: 65   BP: 108/55 )  RR: 18   SpO2: 96% (    O2 Parameters below as of 15 Aug 2023 10:18  Patient On (Oxygen Delivery Method): room air            PHYSICAL EXAM:  GENERAL:   ( X ) NAD, lying in bed comfortably     (  ) obtunded     (  ) lethargic     (  ) somnolent       HEAD:   ( X ) Atraumatic     (  ) hematoma     (  ) laceration (specify location:       )     NECK:  (X  ) Supple     (  ) neck stiffness     (  ) nuchal rigidity     (  )  no JVD     (  ) JVD present ( -- cm)    HEART:  Rate -->     ( X ) normal rate     (  ) bradycardic     (  ) tachycardic  Rhythm -->     ( X ) regular     (  ) regularly irregular     (  ) irregularly irregular  Murmurs -->     ( X ) normal s1s2     (  ) systolic murmur     (  ) diastolic murmur     (  ) continuous murmur      (  ) S3 present     (  ) S4 present    LUNGS:   (  )Unlabored respirations     (  ) tachypnea  ( X ) B/L air entry     (  ) decreased breath sounds in:  (location     )    ( X ) no adventitious sound     (  ) crackles     (  ) wheezing      (  ) rhonchi      (specify location:       )  (  ) chest wall tenderness (specify location:       )    ABDOMEN:   (X  ) Soft     (  ) tense   |   (  ) nondistended     (  ) distended   |   ( X ) +BS     (  ) hypoactive bowel sounds     (  ) hyperactive bowel sounds  (X  ) nontender     (  ) RUQ tenderness     (  ) RLQ tenderness     (  ) LLQ tenderness     (  ) epigastric tenderness     (  ) diffuse tenderness  (  ) Splenomegaly      (  ) Hepatomegaly      (  ) Jaundice     (  ) ecchymosis     EXTREMITIES:  ( X ) Normal     (  ) Rash     (  ) ecchymosis     (  ) varicose veins      (  ) pitting edema     (  ) non-pitting edema   (  ) ulceration     (  ) gangrene:     (location:     )    NERVOUS SYSTEM:    (X  ) A&Ox3     (  ) confused     (  ) lethargic  CN II-XII:     (  ) Intact     (  ) deficits found     (Specify:     )   Upper extremities:     (  ) no sensorimotor deficits     (  ) weakness     (  ) loss of proprioception/vibration     (  ) loss of touch/temperature (specify:    )  Lower extremities:     (  ) no sensorimotor deficits     (  ) weakness     (  ) loss of proprioception/vibration     (  ) loss of touch/temperature (specify:    )    SKIN:   (X  ) No rashes or lesions     (  ) maculopapular rash     (  ) pustules     (  ) vesicles     (  ) ulcer     (  ) ecchymosis     (specify location:     )      (  ) Indwelling Basilio Catheter:   Date insterted:    Reason (  ) Critical illness     (  ) urinary retention    (  ) Accurate Ins/Outs Monitoring     (  ) CMO patient    (  ) Central Line:   Date inserted:  Location: (  ) Right IJ     (  ) Left IJ     (  ) Right Fem     (  ) Left Fem    (  ) SPC        (  ) pigtail       (  ) PEG tube       (  ) colostomy       (  ) jejunostomy  (  ) U-Dall    LABS:                        11.9   8.35  )-----------( 239      ( 13 Aug 2023 07:32 )             36.2     08-13    144  |  104  |  16  ----------------------------<  374<H>  4.6   |  22  |  1.2    Ca    9.2      13 Aug 2023 07:32  Mg     2.0     08-13    TPro  6.2  /  Alb  3.6  /  TBili  0.9  /  DBili  x   /  AST  18  /  ALT  17  /  AlkPhos  88  08-13      Urinalysis Basic - ( 13 Aug 2023 07:32 )    Color: x / Appearance: x / SG: x / pH: x  Gluc: 374 mg/dL / Ketone: x  / Bili: x / Urobili: x   Blood: x / Protein: x / Nitrite: x   Leuk Esterase: x / RBC: x / WBC x   Sq Epi: x / Non Sq Epi: x / Bacteria: x                RADIOLOGY:  CT Head No Cont (08.09.23)  IMPRESSION:    No acute intracranial pathology.    12 Lead ECG (08.14.23 @ 11:31)  Ventricular Rate 60 BPM  Atrial Rate 60 BPM  P-R Interval 144 ms  QRS Duration 70 ms  Q-T Interval 406 ms  QTC Calculation(Bazett) 406 ms  P Axis 50 degrees  R Axis -16 degrees  T Axis 19 degrees  Diagnosis Line Normal sinus rhythm  Inferior infarct , age undetermined  Abnormal ECG

## 2023-08-16 ENCOUNTER — NON-APPOINTMENT (OUTPATIENT)
Age: 73
End: 2023-08-16

## 2023-08-16 ENCOUNTER — APPOINTMENT (OUTPATIENT)
Dept: CARDIOLOGY | Facility: CLINIC | Age: 73
End: 2023-08-16
Payer: MEDICARE

## 2023-08-16 ENCOUNTER — TRANSCRIPTION ENCOUNTER (OUTPATIENT)
Age: 73
End: 2023-08-16

## 2023-08-16 VITALS
HEART RATE: 75 BPM | RESPIRATION RATE: 18 BRPM | SYSTOLIC BLOOD PRESSURE: 143 MMHG | TEMPERATURE: 99 F | DIASTOLIC BLOOD PRESSURE: 66 MMHG

## 2023-08-16 LAB
ALBUMIN SERPL ELPH-MCNC: 3.5 G/DL — SIGNIFICANT CHANGE UP (ref 3.5–5.2)
ALP SERPL-CCNC: 81 U/L — SIGNIFICANT CHANGE UP (ref 30–115)
ALT FLD-CCNC: 14 U/L — SIGNIFICANT CHANGE UP (ref 0–41)
ANION GAP SERPL CALC-SCNC: 11 MMOL/L — SIGNIFICANT CHANGE UP (ref 7–14)
AST SERPL-CCNC: 15 U/L — SIGNIFICANT CHANGE UP (ref 0–41)
BASOPHILS # BLD AUTO: 0.03 K/UL — SIGNIFICANT CHANGE UP (ref 0–0.2)
BASOPHILS NFR BLD AUTO: 0.5 % — SIGNIFICANT CHANGE UP (ref 0–1)
BILIRUB SERPL-MCNC: 0.5 MG/DL — SIGNIFICANT CHANGE UP (ref 0.2–1.2)
BUN SERPL-MCNC: 14 MG/DL — SIGNIFICANT CHANGE UP (ref 10–20)
CALCIUM SERPL-MCNC: 9 MG/DL — SIGNIFICANT CHANGE UP (ref 8.4–10.5)
CHLORIDE SERPL-SCNC: 106 MMOL/L — SIGNIFICANT CHANGE UP (ref 98–110)
CO2 SERPL-SCNC: 24 MMOL/L — SIGNIFICANT CHANGE UP (ref 17–32)
CREAT SERPL-MCNC: 0.9 MG/DL — SIGNIFICANT CHANGE UP (ref 0.7–1.5)
EGFR: 89 ML/MIN/1.73M2 — SIGNIFICANT CHANGE UP
EOSINOPHIL # BLD AUTO: 0.14 K/UL — SIGNIFICANT CHANGE UP (ref 0–0.7)
EOSINOPHIL NFR BLD AUTO: 2.3 % — SIGNIFICANT CHANGE UP (ref 0–8)
GLUCOSE BLDC GLUCOMTR-MCNC: 143 MG/DL — HIGH (ref 70–99)
GLUCOSE BLDC GLUCOMTR-MCNC: 201 MG/DL — HIGH (ref 70–99)
GLUCOSE BLDC GLUCOMTR-MCNC: 230 MG/DL — HIGH (ref 70–99)
GLUCOSE SERPL-MCNC: 150 MG/DL — HIGH (ref 70–99)
HCT VFR BLD CALC: 35.8 % — LOW (ref 42–52)
HGB BLD-MCNC: 11.7 G/DL — LOW (ref 14–18)
IMM GRANULOCYTES NFR BLD AUTO: 2.6 % — HIGH (ref 0.1–0.3)
LYMPHOCYTES # BLD AUTO: 1.08 K/UL — LOW (ref 1.2–3.4)
LYMPHOCYTES # BLD AUTO: 17.6 % — LOW (ref 20.5–51.1)
MCHC RBC-ENTMCNC: 29.2 PG — SIGNIFICANT CHANGE UP (ref 27–31)
MCHC RBC-ENTMCNC: 32.7 G/DL — SIGNIFICANT CHANGE UP (ref 32–37)
MCV RBC AUTO: 89.3 FL — SIGNIFICANT CHANGE UP (ref 80–94)
MONOCYTES # BLD AUTO: 0.55 K/UL — SIGNIFICANT CHANGE UP (ref 0.1–0.6)
MONOCYTES NFR BLD AUTO: 9 % — SIGNIFICANT CHANGE UP (ref 1.7–9.3)
NEUTROPHILS # BLD AUTO: 4.16 K/UL — SIGNIFICANT CHANGE UP (ref 1.4–6.5)
NEUTROPHILS NFR BLD AUTO: 68 % — SIGNIFICANT CHANGE UP (ref 42.2–75.2)
NRBC # BLD: 0 /100 WBCS — SIGNIFICANT CHANGE UP (ref 0–0)
PLATELET # BLD AUTO: 273 K/UL — SIGNIFICANT CHANGE UP (ref 130–400)
PMV BLD: 10.5 FL — HIGH (ref 7.4–10.4)
POTASSIUM SERPL-MCNC: 4.3 MMOL/L — SIGNIFICANT CHANGE UP (ref 3.5–5)
POTASSIUM SERPL-SCNC: 4.3 MMOL/L — SIGNIFICANT CHANGE UP (ref 3.5–5)
PROT SERPL-MCNC: 5.9 G/DL — LOW (ref 6–8)
RBC # BLD: 4.01 M/UL — LOW (ref 4.7–6.1)
RBC # FLD: 13.2 % — SIGNIFICANT CHANGE UP (ref 11.5–14.5)
SODIUM SERPL-SCNC: 141 MMOL/L — SIGNIFICANT CHANGE UP (ref 135–146)
WBC # BLD: 6.12 K/UL — SIGNIFICANT CHANGE UP (ref 4.8–10.8)
WBC # FLD AUTO: 6.12 K/UL — SIGNIFICANT CHANGE UP (ref 4.8–10.8)

## 2023-08-16 PROCEDURE — 93298 REM INTERROG DEV EVAL SCRMS: CPT

## 2023-08-16 PROCEDURE — G2066: CPT

## 2023-08-16 PROCEDURE — 99239 HOSP IP/OBS DSCHRG MGMT >30: CPT

## 2023-08-16 RX ORDER — LEVETIRACETAM 250 MG/1
1000 TABLET, FILM COATED ORAL
Refills: 0 | Status: DISCONTINUED | OUTPATIENT
Start: 2023-08-16 | End: 2023-08-16

## 2023-08-16 RX ORDER — ISOPROPYL ALCOHOL, BENZOCAINE .7; .06 ML/ML; ML/ML
0 SWAB TOPICAL
Qty: 100 | Refills: 1
Start: 2023-08-16

## 2023-08-16 RX ORDER — INSULIN GLARGINE 100 [IU]/ML
25 INJECTION, SOLUTION SUBCUTANEOUS
Qty: 10 | Refills: 0
Start: 2023-08-16

## 2023-08-16 RX ORDER — LEVETIRACETAM 250 MG/1
1 TABLET, FILM COATED ORAL
Qty: 0 | Refills: 0 | DISCHARGE
Start: 2023-08-16

## 2023-08-16 RX ORDER — INSULIN LISPRO 100/ML
7 VIAL (ML) SUBCUTANEOUS
Qty: 3 | Refills: 3
Start: 2023-08-16

## 2023-08-16 RX ORDER — DULAGLUTIDE 4.5 MG/.5ML
3 INJECTION, SOLUTION SUBCUTANEOUS
Refills: 0 | DISCHARGE

## 2023-08-16 RX ORDER — LOSARTAN POTASSIUM 100 MG/1
1 TABLET, FILM COATED ORAL
Qty: 0 | Refills: 0 | DISCHARGE
Start: 2023-08-16

## 2023-08-16 RX ORDER — LEVETIRACETAM 250 MG/1
1 TABLET, FILM COATED ORAL
Refills: 0 | DISCHARGE

## 2023-08-16 RX ORDER — PIOGLITAZONE HYDROCHLORIDE 15 MG/1
1 TABLET ORAL
Refills: 0 | DISCHARGE

## 2023-08-16 RX ORDER — EMPAGLIFLOZIN 10 MG/1
1 TABLET, FILM COATED ORAL
Refills: 0 | DISCHARGE

## 2023-08-16 RX ORDER — INSULIN GLARGINE 100 [IU]/ML
25 INJECTION, SOLUTION SUBCUTANEOUS
Qty: 0 | Refills: 0 | DISCHARGE
Start: 2023-08-16

## 2023-08-16 RX ORDER — NIFEDIPINE 30 MG
1 TABLET, EXTENDED RELEASE 24 HR ORAL
Qty: 0 | Refills: 0 | DISCHARGE
Start: 2023-08-16

## 2023-08-16 RX ADMIN — LEVETIRACETAM 400 MILLIGRAM(S): 250 TABLET, FILM COATED ORAL at 05:37

## 2023-08-16 RX ADMIN — INSULIN GLARGINE 25 UNIT(S): 100 INJECTION, SOLUTION SUBCUTANEOUS at 09:16

## 2023-08-16 RX ADMIN — Medication 2: at 13:32

## 2023-08-16 RX ADMIN — LOSARTAN POTASSIUM 50 MILLIGRAM(S): 100 TABLET, FILM COATED ORAL at 05:37

## 2023-08-16 RX ADMIN — CHLORHEXIDINE GLUCONATE 1 APPLICATION(S): 213 SOLUTION TOPICAL at 12:25

## 2023-08-16 RX ADMIN — Medication 7 UNIT(S): at 13:32

## 2023-08-16 RX ADMIN — Medication 81 MILLIGRAM(S): at 12:26

## 2023-08-16 RX ADMIN — Medication 60 MILLIGRAM(S): at 05:37

## 2023-08-16 RX ADMIN — Medication 7 UNIT(S): at 09:15

## 2023-08-16 RX ADMIN — Medication 1 APPLICATION(S): at 05:40

## 2023-08-16 NOTE — DISCHARGE NOTE PROVIDER - HOSPITAL COURSE
73M PMHx DM, HTN, HLD, h/o seizure disorders, h/o Bipolar Disorder, h/o BPH, GERD and h/o OM s/p multiple toe amputations presents to the ED for altered mental status/Seizure like activity. Patient was found to be severely hyperglycemic, intubated in ED for AMS, and admitted to MICU for DKA/HHS. Extubated and downgraded to the floor.    #Acute Metabolic Encephalopathy - improved  - Seizure like activity with a a history of seizure disorder  - s/p extubation, now on 2L NC, AAO x3 today . Walked with PT  - VEEG : Moderate generalized slowing, mild to moderate bifrontal focal slowing with small number of diffusely expressed triphasic waves  - CTH without acute process  - Utox neg   - s/p neurology eval, c/w Keppra 1000 mg bid and Vimpat 100 mg bid    - c/w seizure precautions  - psyche eval. recs sodium valproate, lorazepam and melatonin, avoid haldol since prolong QTC (in the setting of opioid withdrawal/delirium)    #Suspected Aspiration PNA  - clinically improving on NC  - blood cx NGTD, Procal 2.73, wound cx NGTD  - completed zosyn for 5 day (on 8/14)    #DKA/HHS - resolved  #History of DM II  - s/p insulin drip  - c/w basal bolus insulin, monitor FS AC and HS     #Upper Extremity bullae, likely due to edema and recent IV  - seen by burn, derm, bedside debridement performed on RUE  - wound cx NGTD     #HTN   - Bp well controlled on nifedipine 60mg daily and losartan 50mg daily      #Thrombocytopenia - resolved, mayi 117  - DIC, HIT panels  negative  - peripheral smear  - on Lovenox     #h/o OM s/p multiple toe amputations  -recently admitted for MSSA bacteremia s/p course of antibiotics, to be completed 8/7  -recent blood cx NGTD     #hx of bipolar disorder  - Cont zyprexa 5mg qD    DVT PPX Lovenox   73M PMHx DM, HTN, HLD, h/o seizure disorders, h/o Bipolar Disorder, h/o BPH, GERD and h/o OM s/p multiple toe amputations presents to the ED for altered mental status/Seizure like activity. Patient was found to be severely hyperglycemic, intubated in ED for AMS, and admitted to MICU for DKA/HHS. Extubated and downgraded to the floor.    #Acute Metabolic Encephalopathy - improved  - Seizure like activity with a a history of seizure disorder  - s/p extubation, walked with PT  - VEEG : Moderate generalized slowing, mild to moderate bifrontal focal slowing with small number of diffusely expressed triphasic waves  - CTH without acute process  - Utox neg   - s/p neurology eval, c/w Keppra 1000 mg bid and Vimpat 100 mg bid    - c/w seizure precautions  - psyche eval. recs sodium valproate, lorazepam and melatonin, avoid haldol since prolong QTC (in the setting of opioid withdrawal/delirium)    #Suspected Aspiration PNA  - clinically improving on NC  - blood cx NGTD, Procal 2.73, wound cx NGTD  - completed zosyn for 5 day (on 8/14)    #DKA/HHS - resolved  #History of DM II  - s/p insulin drip  - c/w basal bolus insulin, monitor FS AC and HS     #Upper Extremity bullae, likely due to edema and recent IV  - seen by burn, derm, bedside debridement performed on RUE  - wound cx NGTD     #HTN   - Bp well controlled on nifedipine 60mg daily and losartan 50mg daily      #Thrombocytopenia - resolved, mayi 117  - DIC, HIT panels  negative  - peripheral smear  - on Lovenox     #h/o OM s/p multiple toe amputations  -recently admitted for MSSA bacteremia s/p course of antibiotics, to be completed 8/7  -recent blood cx NGTD     #hx of bipolar disorder  - Cont zyprexa 5mg qD    DVT PPX Lovenox   73M PMHx DM, HTN, HLD, h/o seizure disorders, h/o Bipolar Disorder, h/o BPH, GERD and h/o OM s/p multiple toe amputations presents to the ED for altered mental status/Seizure like activity. Patient was found to be severely hyperglycemic, intubated in ED for AMS, and admitted to MICU for DKA/HHS. Extubated and downgraded to the floor.    #Acute Metabolic Encephalopathy - improved  - Seizure like activity with a a history of seizure disorder  - s/p extubation, walked with PT  - VEEG : Moderate generalized slowing, mild to moderate bifrontal focal slowing with small number of diffusely expressed triphasic waves  - CTH without acute process  - Utox neg   - s/p neurology eval, c/w Keppra 1000 mg bid and Vimpat 100 mg bid    - c/w seizure precautions  - psychiatry eval. was completed recs sodium valproate, lorazepam and melatonin, avoid haldol since prolong QTC (in the setting of opioid withdrawal/delirium)    #Suspected Aspiration PNA  - clinically improving on NC  - blood cx NGTD, Procal 2.73, wound cx NGTD  - completed zosyn for 5 day (on 8/14)    #DKA/HHS - resolved  #History of DM II  - s/p insulin drip  - c/w basal bolus insulin, monitor FS AC and HS   - New insulin regimen therapy was prescribed to the patient, Home health services was arranged to assist patient's blood sugar level monitoring  -Patient's family was instructed to schedule pt. an appointment with Primary care provider in next 7 days to further manage pt's DM .   - Carbohydrate controlled diet compliance was d/w patient's family.     #Upper Extremity bullae, likely due to edema and recent IV  - seen by burn, derm, bedside debridement performed on RUE  - wound cxs NGTD   - continue topical application with silver sulfadiazine cream , cover with kerlex, outpatient Burn team f/up to assess healing process    #HTN   - Bp well controlled on nifedipine 60mg daily and losartan 50mg daily      #Thrombocytopenia - resolved, mayi 117  - DIC, HIT panels  negative  - peripheral smear  - on Lovenox during inpatient stay for DVT prophylaxis     #h/o OM s/p multiple toe amputations  -recently admitted for MSSA bacteremia s/p course of antibiotics, to be completed 8/7  -recent blood cx NGTD     #hx of bipolar disorder  - Cont zyprexa 5mg qD    DVT PPX Lovenox

## 2023-08-16 NOTE — DISCHARGE NOTE PROVIDER - PROVIDER TOKENS
PROVIDER:[TOKEN:[[id number]],FOLLOWUP:[1 week],ESTABLISHEDPATIENT:[T]],PROVIDER:[TOKEN:[[id number]],FOLLOWUP:[2 weeks]]

## 2023-08-16 NOTE — PROGRESS NOTE ADULT - PROVIDER SPECIALTY LIST ADULT
Hospitalist
Internal Medicine
MICU
Internal Medicine
Internal Medicine
MICU
Critical Care
Hospitalist
Internal Medicine
Internal Medicine
Neurology
Critical Care
Internal Medicine
MICU
MICU
Neurology
Critical Care

## 2023-08-16 NOTE — DISCHARGE NOTE PROVIDER - CARE PROVIDER_API CALL
Imtiaz Singletary  Psychiatry  [address] [place] [address]  Phone: [phone]  Fax: [phone]  Established Patient  Follow Up Time: 1 week    Sin Ontiveros  Neurology  [address] [place] [address]  Phone: [phone]  Fax: [phone]  Follow Up Time: 2 weeks

## 2023-08-16 NOTE — DISCHARGE NOTE PROVIDER - NSDCHHNEEDSERVICE_GEN_ALL_CORE
Wound care and assessment Medication teaching and assessment/Teaching and training/Wound care and assessment

## 2023-08-16 NOTE — DISCHARGE NOTE PROVIDER - NSDCCPCAREPLAN_GEN_ALL_CORE_FT
PRINCIPAL DISCHARGE DIAGNOSIS  Diagnosis: Acute respiratory failure with hypoxia  Assessment and Plan of Treatment: you presented to us with altered mental status, seizure activity and severe hyperglycemia, you were intubated and admitted in the ICU treated for hyperosmolar hyperglycemic syndrome, extubated and downgraded to the floor. Initially you were disoriented, neurology and psychiatry were on board, recommended to continue with keppra and started on ativan and haldol as per need in the setting of opioid withdrawal. You improved over the course, alert awake oriented and walked with physical therapist, medically stable to be discharged. Please take your medications as directed, do not skip doses. Apply the ointment on your right arm twice daily. Please follow up with endocrinologist for uncontrolled diabetes mellitus, psychiatrist, neurologist and your primary care physician as out patient.      SECONDARY DISCHARGE DIAGNOSES  Diagnosis: Type 2 diabetes mellitus with hyperosmolar hyperglycemic state (HHS)  Assessment and Plan of Treatment:     Diagnosis: Seizures  Assessment and Plan of Treatment:

## 2023-08-16 NOTE — DISCHARGE NOTE PROVIDER - NSDCMRMEDTOKEN_GEN_ALL_CORE_FT
Actos 15 mg oral tablet: 1 orally once a day  albuterol 90 mcg/inh inhalation aerosol: 2 puff(s) inhaled every 6 hours  aspirin 81 mg oral tablet, chewable: 1 tab(s) orally once a day; take with food  Incruse Ellipta 62.5 mcg/inh inhalation powder: 1 inhaler(s) inhaled once a day   Janumet 50 mg-1000 mg oral tablet: 1 tab(s) orally 2 times a day with meals (with breakfast and with dinner)  Jardiance 25 mg oral tablet: 1 orally once a day  lacosamide 100 mg oral tablet: 1 tab(s) orally every 12 hours MDD:200 mg  levETIRAcetam 750 mg oral tablet: 1 orally 2 times a day  omeprazole 20 mg oral delayed release capsule: 1 cap(s) orally once a day in the morning, take 30 minutes before breakfast  senna leaf extract oral tablet: 2 tab(s) orally once a day (at bedtime)  Trulicity Pen 3 mg/0.5 mL subcutaneous solution: 3 subcutaneously every 7 days   Actos 15 mg oral tablet: 1 orally once a day  albuterol 90 mcg/inh inhalation aerosol: 2 puff(s) inhaled every 6 hours  aspirin 81 mg oral tablet, chewable: 1 tab(s) orally once a day; take with food  Incruse Ellipta 62.5 mcg/inh inhalation powder: 1 inhaler(s) inhaled once a day   insulin glargine 100 units/mL subcutaneous solution: 25 unit(s) subcutaneous  Janumet 50 mg-1000 mg oral tablet: 1 tab(s) orally 2 times a day with meals (with breakfast and with dinner)  Jardiance 25 mg oral tablet: 1 orally once a day  lacosamide 100 mg oral tablet: 1 tab(s) orally every 12 hours MDD:200 mg  levETIRAcetam 1000 mg oral tablet: 1 tab(s) orally 2 times a day  levETIRAcetam 750 mg oral tablet: 1 orally 2 times a day  losartan 50 mg oral tablet: 1 tab(s) orally once a day  NIFEdipine 60 mg oral tablet, extended release: 1 tab(s) orally once a day  omeprazole 20 mg oral delayed release capsule: 1 cap(s) orally once a day in the morning, take 30 minutes before breakfast  senna leaf extract oral tablet: 2 tab(s) orally once a day (at bedtime)  silver sulfADIAZINE 1% topical cream: 1 Apply topically to affected area 2 times a day  Trulicity Pen 3 mg/0.5 mL subcutaneous solution: 3 subcutaneously every 7 days   albuterol 90 mcg/inh inhalation aerosol: 2 puff(s) inhaled every 6 hours  alcohol swabs: Apply topically to affected area 4 times a day  aspirin 81 mg oral tablet, chewable: 1 tab(s) orally once a day; take with food  Freestyle Precision Carlos Strips: Test blood sugar four times a day when you see check blood glucose symbol or when symptoms don&#x27;t match Sherlyn reading.  glucometer (per patient&#x27;s insurance): Test blood sugars four times a day. Dispense #1 glucometer.  Incruse Ellipta 62.5 mcg/inh inhalation powder: 1 inhaler(s) inhaled once a day   insulin glargine 100 units/mL subcutaneous solution: 25 unit(s) subcutaneous once a day (in the morning)  insulin lispro 100 units/mL injectable solution: 7 unit(s) injectable 3 times a day (before meals)  lacosamide 100 mg oral tablet: 1 tab(s) orally every 12 hours MDD:200 mg  lancets: 1 application subcutaneously 4 times a day  levETIRAcetam 1000 mg oral tablet: 1 tab(s) orally 2 times a day  losartan 50 mg oral tablet: 1 tab(s) orally once a day  NIFEdipine 60 mg oral tablet, extended release: 1 tab(s) orally once a day  omeprazole 20 mg oral delayed release capsule: 1 cap(s) orally once a day in the morning, take 30 minutes before breakfast  senna leaf extract oral tablet: 2 tab(s) orally once a day (at bedtime)  silver sulfADIAZINE 1% topical cream: Apply topically to affected area 2 times a day  U-100 Insulin Syringe, 1 mL: 1 application subcutaneously 2 times a day ** 1 mL holds up to 100 units of insulin **

## 2023-08-16 NOTE — DISCHARGE NOTE PROVIDER - NSDCFUSCHEDAPPT_GEN_ALL_CORE_FT
[facility] Physician [facility]  CARDIOLOGY [address] [facility]  Scheduled Appointment: 09/19/2023

## 2023-08-16 NOTE — ADVANCED PRACTICE NURSE CONSULT - RECOMMEDATIONS
Wound and Skin care recs.'   Clean wound with soap and water, pat dry  then apply Silvadene as ordered twice daily, cover with xeroform and  kerlex.  Skin and incontinence care.  Pressure Injury Prevention.  Assess wound and inform primary provider of any changes.   Case discussed with primary RN.  Wound/ ostomy specialist to f/u as needed.     Offloading: [x ] Frequent position changes [ ] Devices/Equipment  Cleansing: [ ] Saline [x ] Soap/Water [ ] Other: ______  Topicals: [] Barrier Cream [x ] Silvadene [ ] Enzymatic Wound Debridement.  Dressings: [x ] Dry, Kerlex [ ] Allevyn  Foam [ ] Absorbant Pads [ ] Collagenase.  Other Recs. as per Primary team.

## 2023-08-16 NOTE — PROGRESS NOTE ADULT - REASON FOR ADMISSION
Altered Mental Status and Hyperglycemia

## 2023-08-16 NOTE — PROGRESS NOTE ADULT - SUBJECTIVE AND OBJECTIVE BOX
JOSÉ MANUEL PORTER  [facility]-N 3A (Back) 018 B ([facility]-N 3A (Back))        Patient was evaluated and examined  by bedside, tolerating diet well, no confusion, cooperative         REVIEW OF SYSTEMS: please see pertinent positives mentioned above, all other 12 ROS negative      T(C): , Max: 36.7 (08-15-23 @ 21:00)  HR: 62 (08-16-23 @ 05:00)  BP: 160/67 (08-16-23 @ 05:00)  RR: 18 (08-16-23 @ 05:00)  SpO2: 98% (08-16-23 @ 05:00)  CAPILLARY BLOOD GLUCOSE      POCT Blood Glucose.: 143 mg/dL (16 Aug 2023 07:52)  POCT Blood Glucose.: 146 mg/dL (15 Aug 2023 22:27)  POCT Blood Glucose.: 192 mg/dL (15 Aug 2023 17:29)  POCT Blood Glucose.: 108 mg/dL (15 Aug 2023 13:09)      PHYSICAL EXAM:  General: NAD, AAOX3, patient is laying comfortably in bed  HEENT: AT, NC, Supple, NO JVD, NO CB  Lungs: CTA B/L, no wheezing, no rhonchi  CVS: normal S1, S2, RRR, NO M/G/R  Abdomen: soft, bowel sounds present, non-tender, non-distended  Extremities: no edema, no clubbing, no cyanosis, positive peripheral pulses b/l  Neuro: no acute focal neurological deficits  Skin: small open wounds healing well on RUE, covered with dressing      LAB  CBC  Date: 08-16-23 @ 07:24  Mean cell Ncrarwckrs56.2  Mean cell Hemoglobin Conc32.7  Mean cell Volum 89.3  Platelet count-Automate 273  RBC Count 4.01  Red Cell Distrib Width13.2  WBC Count6.12  % Albumin, Urine--  Hematocrit 35.8  Hemoglobin 11.7  CBC  Date: 08-15-23 @ 07:39  Mean cell Nfjlpxxlhe45.6  Mean cell Hemoglobin Conc32.4  Mean cell Volum 91.2  Platelet count-Automate 238  RBC Count 3.62  Red Cell Distrib Width13.6  WBC Count5.51  % Albumin, Urine--  Hematocrit 33.0  Hemoglobin 10.7  CBC  Date: 08-14-23 @ 08:33  Mean cell Zevgmpaftr07.6  Mean cell Hemoglobin Conc32.0  Mean cell Volum 92.4  Platelet count-Automate 217  RBC Count 3.82  Red Cell Distrib Width14.0  WBC Count6.95  % Albumin, Urine--  Hematocrit 35.3  Hemoglobin 11.3  CBC  Date: 08-13-23 @ 07:32  Mean cell Cvienvjkbn10.0  Mean cell Hemoglobin Conc32.9  Mean cell Volum 91.2  Platelet count-Automate 239  RBC Count 3.97  Red Cell Distrib Width13.9  WBC Count8.35  % Albumin, Urine--  Hematocrit 36.2  Hemoglobin 11.9  CBC  Date: 08-12-23 @ 06:51  Mean cell Rrkcfstefl01.6  Mean cell Hemoglobin Conc32.8  Mean cell Volum 90.1  Platelet count-Automate 183  RBC Count 3.82  Red Cell Distrib Width14.0  WBC Count6.98  % Albumin, Urine--  Hematocrit 34.4  Hemoglobin 11.3  CBC  Date: 08-11-23 @ 06:40  Mean cell Gfruftylzu61.2  Mean cell Hemoglobin Conc34.0  Mean cell Volum 88.8  Platelet count-Automate 189  RBC Count 4.01  Red Cell Distrib Width14.1  WBC Count9.37  % Albumin, Urine--  Hematocrit 35.6  Hemoglobin 12.1  CBC  Date: 08-10-23 @ 05:50  Mean cell Uokbaxtefr81.5  Mean cell Hemoglobin Conc33.0  Mean cell Volum 89.4  Platelet count-Automate 161  RBC Count 3.86  Red Cell Distrib Width14.2  WBC Count11.04  % Albumin, Urine--  Hematocrit 34.5  Hemoglobin 11.4    [facility]  08-16-23 @ 07:24  Blood Gas Arterial-Calcium,Ionized--  Blood Urea Nitrogen, Serum 14 mg/dL [10 - 20]  Carbon Dioxide, Serum24 mmol/L [17 - 32]  Chloride, Jbtbp449 mmol/L [98 - 110]  Creatinie, Serum0.9 mg/dL [0.7 - 1.5]  Glucose, Kxebv507 mg/dL<H> [70 - 99]  Potassium, Serum4.3 mmol/L [3.5 - 5.0]  Sodium, Serum 141 mmol/L [135 - 146]  [facility]  08-15-23 @ 07:39  Blood Gas Arterial-Calcium,Ionized--  Blood Urea Nitrogen, Serum 20 mg/dL [10 - 20]  Carbon Dioxide, Serum28 mmol/L [17 - 32]  Chloride, Jjjiq461 mmol/L [98 - 110]  Creatinie, Serum1.0 mg/dL [0.7 - 1.5]  Glucose, Gfwkw008 mg/dL<H> [70 - 99]  Potassium, Serum4.1 mmol/L [3.5 - 5.0]  Sodium, Serum 143 mmol/L [135 - 146]  [facility]  08-14-23 @ 08:33  Blood Gas Arterial-Calcium,Ionized--  Blood Urea Nitrogen, Serum 21 mg/dL<H> [10 - 20]  Carbon Dioxide, Serum26 mmol/L [17 - 32]  Chloride, Btpse748 mmol/L [98 - 110]  Creatinie, Serum1.3 mg/dL [0.7 - 1.5]  Glucose, Cphoi131 mg/dL<H> [70 - 99]  Potassium, Serum4.0 mmol/L [3.5 - 5.0]  Sodium, Serum 143 mmol/L [135 - 146]  [facility]  08-13-23 @ 07:32  Blood Gas Arterial-Calcium,Ionized--  Blood Urea Nitrogen, Serum 16 mg/dL [10 - 20]  Carbon Dioxide, Serum22 mmol/L [17 - 32]  Chloride, Glrye526 mmol/L [98 - 110]  Creatinie, Serum1.2 mg/dL [0.7 - 1.5]  Glucose, Omncm733 mg/dL<H> [70 - 99]  Potassium, Serum4.6 mmol/L [3.5 - 5.0]  Sodium, Serum 144 mmol/L [135 - 146]  [facility]  08-12-23 @ 06:51  Blood Gas Arterial-Calcium,Ionized--  Blood Urea Nitrogen, Serum 12 mg/dL [10 - 20]  Carbon Dioxide, Serum24 mmol/L [17 - 32]  Chloride, Iyddl385 mmol/L [98 - 110]  Creatinie, Serum1.1 mg/dL [0.7 - 1.5]  Glucose, Qcxtd483 mg/dL<H> [70 - 99]  Potassium, Serum4.4 mmol/L [3.5 - 5.0]  Sodium, Serum 141 mmol/L [135 - 146]              Microbiology:    Culture - Tissue with Gram Stain (collected 08-09-23 @ 13:07)  Source: .Tissue RUE  Gram Stain (08-10-23 @ 01:59):    No polymorphonuclear leukocytes seen per low power field    No organisms seen per oil power field  Final Report (08-14-23 @ 22:13):    No growth at 5 days    Culture - Blood (collected 08-09-23 @ 05:08)  Source: .Blood None  Final Report (08-14-23 @ 14:00):    No growth at 5 days    Culture - Blood (collected 08-08-23 @ 12:20)  Source: .Blood None  Final Report (08-13-23 @ 23:01):    No growth at 5 days    Culture - Urine (collected 08-07-23 @ 06:40)  Source: Clean Catch Clean Catch (Midstream)  Final Report (08-08-23 @ 09:42):    No growth    Culture - Blood (collected 08-07-23 @ 03:15)  Source: .Blood Blood-Peripheral  Final Report (08-12-23 @ 14:00):    No growth at 5 days    Culture - Blood (collected 08-07-23 @ 03:15)  Source: .Blood Blood-Peripheral  Final Report (08-12-23 @ 14:00):    No growth at 5 days        Medications:  aspirin  chewable 81 milliGRAM(s) Oral daily  chlorhexidine 0.12% Liquid 15 milliLiter(s) Oral Mucosa two times a day  chlorhexidine 2% Cloths 1 Application(s) Topical daily  cloNIDine 0.1 milliGRAM(s) Oral every 6 hours PRN  dextrose 5%. 1000 milliLiter(s) IV Continuous <Continuous>  dextrose 5%. 1000 milliLiter(s) IV Continuous <Continuous>  dextrose 50% Injectable 25 Gram(s) IV Push once  dextrose 50% Injectable 12.5 Gram(s) IV Push once  dextrose 50% Injectable 25 Gram(s) IV Push once  dextrose Oral Gel 15 Gram(s) Oral once PRN  divalproex  milliGRAM(s) Oral two times a day PRN  enoxaparin Injectable 40 milliGRAM(s) SubCutaneous every 24 hours  glucagon  Injectable 1 milliGRAM(s) IntraMuscular once  insulin glargine Injectable (LANTUS) 25 Unit(s) SubCutaneous every morning  insulin lispro (ADMELOG) corrective regimen sliding scale   SubCutaneous three times a day before meals  insulin lispro Injectable (ADMELOG) 7 Unit(s) SubCutaneous before breakfast  insulin lispro Injectable (ADMELOG) 7 Unit(s) SubCutaneous before lunch  insulin lispro Injectable (ADMELOG) 7 Unit(s) SubCutaneous before dinner  levETIRAcetam 1000 milliGRAM(s) Oral two times a day  LORazepam   Injectable 2 milliGRAM(s) IntraMuscular every 6 hours PRN  losartan 50 milliGRAM(s) Oral daily  melatonin 5 milliGRAM(s) Oral at bedtime  NIFEdipine XL 60 milliGRAM(s) Oral daily  polyethylene glycol 3350 17 Gram(s) Oral two times a day  senna 2 Tablet(s) Oral at bedtime  silver sulfADIAZINE 1% Cream 1 Application(s) Topical two times a day        Assessment and Plan:  Patient is a 74 y/o Male with PMHx DM, HTN, HLD, h/o seizure disorders, h/o Bipolar Disorder, h/o BPH, GERD and h/o OM s/p multiple toe amputations presents to the ED for altered mental status/Seizure like activity. Patient was found to be severely hyperglycemic, intubated in ED for AMS, and admitted to MICU for DKA/HHS. Post medical stabilization, patient was downgraded to medical unit.     #Acute Metabolic Encephalopathy - much improved now   #Seizure like activity with a a history of seizure disorder  - s/p extubation, now on 2L NC, AAO x2 earlier > Now AAOx3. Walked with PT (8/15)  - VEEG : Moderate generalized slowing, mild to moderate bifrontal focal slowing with small number of diffusely expressed triphasic waves  - CTH without acute process  - Utox neg   - s/p neurology eval, c/w Keppra 1000 mg bid   - c/w seizure precautions    8/15: Per family patient is mentally back to baseline. Most likely presenting complants were all metabolic enceph/delirium and no underlying psych conditions active apparently right now  8/16: Patient remains cooperative, calm, no agitation episodes, stable for d/c home with home care and caregiver, anticipated to f/up with PCP, Neuro and Psychiatry specialist post d/c home     #RUE swelling: likely from IV infiltration. Open blisters healing well, continue local Silver sulfadiazine application with kergene, outpatient Burn team f/up     #Suspected Aspiration PNA- resolved   - clinically improving on NC  - blood cx NGTD, Procal 2.73, wound cx NGTD  - s/p zosyn for 5 day     #DKA/HHS - resolved  #History of DM II  - s/p insulin drip  - c/w basal bolus insulin, monitor FS AC and HS, adjusted today : lantus  25units subc. once daily in the morning , lispro 7 TID AC.   - All new insulin regimen was prescribed to patient, will continued close bsfs supervision with home health services and PCP f/up for outpatient DM management  - Carb. controlled diet     #HTN  - better controlled on nifedipine 60mg daily and losartan 50mg daily      #Thrombocytopenia - resolved, mayi 117  - DIC, HIT panels  negative  - peripheral smear  - cw  prophylactic Lovenox     #h/o OM s/p multiple toe amputations  recently admitted for MSSA bacteremia s/p course of antibiotics, to be completed 8/7  recent blood cx NGTD     #hx of bipolar disorder- No more zyprexa, outpatient Psychiatry specialist f/up      DVT PPX Lovenox    #Progress Note Handoff: Patient was medically optimized, stable for d/c home with home care and Caregiver    Family discussion: yes, medical team Disposition: Home__today    Total time spent to complete patient's bedside assessment, review medical chart, discuss discharge plan of care with covering medical team was more than 35 minutes with >50% of time spent face to face with patient, discussion with patient/family and/or coordination of care

## 2023-08-16 NOTE — ADVANCED PRACTICE NURSE CONSULT - ASSESSMENT
History of Present Illness:  Reason for Admission: Altered Mental Status and Hyperglycemia  History of Present Illness:   73M PMHx DM, HTN, HLD, h/o seizure disorders, h/o Bipolar Disorder, h/o BPH, GERD and h/o OM s/p multiple toe amputations presents to the ED for altered mental status. As per wife, patient had a witnessed seizure-like activity at home that lasted for several minutes. EMS was called and found patient to be severely hyperglycemic. Patient was arousable while en route to the hospital. Wife reports that patient has not been acting himself for the last 2 weeks. States that he has a drug problem and takes medications for his chronic pain. He has also been hiding his medications and she is unsure if he has been taking any of them. As per patient's wife, his medications should be the same since last admission except for one possible change, but she is unsure of which one.    Vitals: Temp 98.7F, /74, HR 95, RR 20, SpO2 99% on RA    Labs: Hgb 12.9 (previously 12.5), Na 130 (142 corrected for hyperglycemia), Glucose 859, Beta-Hydroxy 1.0, Serum Osm 343, Cr 1.6 (previously 1.1)    Imaging: CT Head non con: negative for acute intracranial pathology    In the ED:  - s/p 1L LR bolus  - started on Insulin drip  - s/p intubation for airway protection iso AMS and 1 episode of witness seizure in the ED  - s/p Narcan nebulizer x1  - OG tube placed - 1700cc output stomach content/bile    Admitted to MICU s/p intubation for airway protection and management of HHS.       Review of Systems:  Unable to obtain due to: Altered mentation  Other Review of Systems: All other review of systems negative, except as noted in HPI      Allergies and Intolerances:        Allergies:  	No Known Allergies:     Home Medications:   * Patient Currently Takes Medications as of 30-Jun-2023 11:17 documented in Structured Notes  · 	senna leaf extract oral tablet: 2 tab(s) orally once a day (at bedtime)  · 	Incruse Ellipta 62.5 mcg/inh inhalation powder: 1 inhaler(s) inhaled once a day   · 	albuterol 90 mcg/inh inhalation aerosol: 2 puff(s) inhaled every 6 hours  · 	aspirin 81 mg oral tablet, chewable: 1 tab(s) orally once a day, take with food  · 	Janumet 50 mg-1000 mg oral tablet: 1 tab(s) orally 2 times a day with meals (with breakfast and with dinner)  · 	lacosamide 100 mg oral tablet: 1 tab(s) orally every 12 hours MDD:200 mg  · 	omeprazole 20 mg oral delayed release capsule: 1 cap(s) orally once a day in the morning, take 30 minutes before breakfast  · 	Trulicity Pen 3 mg/0.5 mL subcutaneous solution: 3 subcutaneously every 7 days  · 	Actos 15 mg oral tablet: 1 orally once a day  · 	levETIRAcetam 750 mg oral tablet: 1 orally 2 times a day  · 	Jardiance 25 mg oral tablet: 1 orally once a day    Patient received in bed, alert and oriented, limited mobility, high risk for pressure injury development or progression.    Wound  Type & Location: Right Upper Extremity  Size: multiple areas of denuded skin  Tissue Description: Pale pink  Wound Exudate : scant serous  Wound Edge: irregular  Periwound Condition: intact

## 2023-08-22 ENCOUNTER — INPATIENT (INPATIENT)
Facility: HOSPITAL | Age: 73
LOS: 1 days | Discharge: HOME HEALTH | DRG: 300 | End: 2023-08-24
Attending: INTERNAL MEDICINE | Admitting: STUDENT IN AN ORGANIZED HEALTH CARE EDUCATION/TRAINING PROGRAM
Payer: MEDICARE

## 2023-08-22 VITALS
HEART RATE: 77 BPM | SYSTOLIC BLOOD PRESSURE: 209 MMHG | OXYGEN SATURATION: 96 % | TEMPERATURE: 98 F | HEIGHT: 71 IN | RESPIRATION RATE: 17 BRPM | DIASTOLIC BLOOD PRESSURE: 98 MMHG | WEIGHT: 209 LBS

## 2023-08-22 DIAGNOSIS — Z89.421 ACQUIRED ABSENCE OF OTHER RIGHT TOE(S): ICD-10-CM

## 2023-08-22 DIAGNOSIS — G40.909 EPILEPSY, UNSPECIFIED, NOT INTRACTABLE, WITHOUT STATUS EPILEPTICUS: ICD-10-CM
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
## 2023-08-22 DIAGNOSIS — F31.9 BIPOLAR DISORDER, UNSPECIFIED: ICD-10-CM

## 2023-08-22 DIAGNOSIS — R30.0 DYSURIA: ICD-10-CM

## 2023-08-22 DIAGNOSIS — K21.9 GASTRO-ESOPHAGEAL REFLUX DISEASE WITHOUT ESOPHAGITIS: ICD-10-CM

## 2023-08-22 DIAGNOSIS — I10 ESSENTIAL (PRIMARY) HYPERTENSION: ICD-10-CM

## 2023-08-22 DIAGNOSIS — Z89.429 ACQUIRED ABSENCE OF OTHER TOE(S), UNSPECIFIED SIDE: Chronic | ICD-10-CM

## 2023-08-22 DIAGNOSIS — I82.621 ACUTE EMBOLISM AND THROMBOSIS OF DEEP VEINS OF RIGHT UPPER EXTREMITY: ICD-10-CM

## 2023-08-22 DIAGNOSIS — Z89.421 ACQUIRED ABSENCE OF OTHER RIGHT TOE(S): Chronic | ICD-10-CM

## 2023-08-22 DIAGNOSIS — Z79.82 LONG TERM (CURRENT) USE OF ASPIRIN: ICD-10-CM

## 2023-08-22 DIAGNOSIS — Z79.4 LONG TERM (CURRENT) USE OF INSULIN: ICD-10-CM
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
## 2023-08-22 DIAGNOSIS — E11.65 TYPE 2 DIABETES MELLITUS WITH HYPERGLYCEMIA: ICD-10-CM

## 2023-08-22 DIAGNOSIS — T83.84XA PAIN DUE TO GENITOURINARY PROSTHETIC DEVICES, IMPLANTS AND GRAFTS, INITIAL ENCOUNTER: ICD-10-CM

## 2023-08-22 DIAGNOSIS — Z89.412 ACQUIRED ABSENCE OF LEFT GREAT TOE: ICD-10-CM

## 2023-08-22 DIAGNOSIS — K57.92 DIVERTICULITIS OF INTESTINE, PART UNSPECIFIED, WITHOUT PERFORATION OR ABSCESS WITHOUT BLEEDING: ICD-10-CM

## 2023-08-22 DIAGNOSIS — E78.5 HYPERLIPIDEMIA, UNSPECIFIED: ICD-10-CM

## 2023-08-22 DIAGNOSIS — E87.5 HYPERKALEMIA: ICD-10-CM

## 2023-08-22 DIAGNOSIS — I16.0 HYPERTENSIVE URGENCY: ICD-10-CM

## 2023-08-22 DIAGNOSIS — N40.0 BENIGN PROSTATIC HYPERPLASIA WITHOUT LOWER URINARY TRACT SYMPTOMS: ICD-10-CM

## 2023-08-22 DIAGNOSIS — I80.8 PHLEBITIS AND THROMBOPHLEBITIS OF OTHER SITES: ICD-10-CM

## 2023-08-22 DIAGNOSIS — E11.40 TYPE 2 DIABETES MELLITUS WITH DIABETIC NEUROPATHY, UNSPECIFIED: ICD-10-CM

## 2023-08-22 DIAGNOSIS — R91.8 OTHER NONSPECIFIC ABNORMAL FINDING OF LUNG FIELD: ICD-10-CM

## 2023-08-22 LAB
ALBUMIN SERPL ELPH-MCNC: 3.7 G/DL — SIGNIFICANT CHANGE UP (ref 3.5–5.2)
ALP SERPL-CCNC: 108 U/L — SIGNIFICANT CHANGE UP (ref 30–115)
ALT FLD-CCNC: 23 U/L — SIGNIFICANT CHANGE UP (ref 0–41)
ANION GAP SERPL CALC-SCNC: 12 MMOL/L — SIGNIFICANT CHANGE UP (ref 7–14)
APPEARANCE UR: CLEAR — SIGNIFICANT CHANGE UP
AST SERPL-CCNC: 37 U/L — SIGNIFICANT CHANGE UP (ref 0–41)
B-OH-BUTYR SERPL-SCNC: 0.3 MMOL/L — SIGNIFICANT CHANGE UP
BACTERIA # UR AUTO: NEGATIVE /HPF — SIGNIFICANT CHANGE UP
BASE EXCESS BLDV CALC-SCNC: 2.1 MMOL/L — SIGNIFICANT CHANGE UP (ref -2–3)
BASOPHILS # BLD AUTO: 0.05 K/UL — SIGNIFICANT CHANGE UP (ref 0–0.2)
BASOPHILS NFR BLD AUTO: 0.6 % — SIGNIFICANT CHANGE UP (ref 0–1)
BILIRUB SERPL-MCNC: 0.3 MG/DL — SIGNIFICANT CHANGE UP (ref 0.2–1.2)
BILIRUB UR-MCNC: NEGATIVE — SIGNIFICANT CHANGE UP
BUN SERPL-MCNC: 14 MG/DL — SIGNIFICANT CHANGE UP (ref 10–20)
CA-I SERPL-SCNC: 1.21 MMOL/L — SIGNIFICANT CHANGE UP (ref 1.15–1.33)
CALCIUM SERPL-MCNC: 9 MG/DL — SIGNIFICANT CHANGE UP (ref 8.4–10.4)
CAST: 0 /LPF — SIGNIFICANT CHANGE UP (ref 0–4)
CHLORIDE SERPL-SCNC: 102 MMOL/L — SIGNIFICANT CHANGE UP (ref 98–110)
CO2 SERPL-SCNC: 22 MMOL/L — SIGNIFICANT CHANGE UP (ref 17–32)
COLOR SPEC: YELLOW — SIGNIFICANT CHANGE UP
CREAT SERPL-MCNC: 1 MG/DL — SIGNIFICANT CHANGE UP (ref 0.7–1.5)
DIFF PNL FLD: NEGATIVE — SIGNIFICANT CHANGE UP
EGFR: 78 ML/MIN/1.73M2 — SIGNIFICANT CHANGE UP
EOSINOPHIL # BLD AUTO: 0.01 K/UL — SIGNIFICANT CHANGE UP (ref 0–0.7)
EOSINOPHIL NFR BLD AUTO: 0.1 % — SIGNIFICANT CHANGE UP (ref 0–8)
GAS PNL BLDV: 135 MMOL/L — LOW (ref 136–145)
GAS PNL BLDV: SIGNIFICANT CHANGE UP
GAS PNL BLDV: SIGNIFICANT CHANGE UP
GLUCOSE BLDC GLUCOMTR-MCNC: 269 MG/DL — HIGH (ref 70–99)
GLUCOSE SERPL-MCNC: 450 MG/DL — CRITICAL HIGH (ref 70–99)
GLUCOSE UR QL: >=1000 MG/DL
HCO3 BLDV-SCNC: 28 MMOL/L — SIGNIFICANT CHANGE UP (ref 22–29)
HCT VFR BLD CALC: 35.1 % — LOW (ref 42–52)
HCT VFR BLDA CALC: 33 % — LOW (ref 39–51)
HGB BLD CALC-MCNC: 10.9 G/DL — LOW (ref 12.6–17.4)
HGB BLD-MCNC: 11.2 G/DL — LOW (ref 14–18)
IMM GRANULOCYTES NFR BLD AUTO: 1.4 % — HIGH (ref 0.1–0.3)
KETONES UR-MCNC: NEGATIVE MG/DL — SIGNIFICANT CHANGE UP
LACTATE BLDV-MCNC: 1.1 MMOL/L — SIGNIFICANT CHANGE UP (ref 0.5–2)
LACTATE SERPL-SCNC: 1.4 MMOL/L — SIGNIFICANT CHANGE UP (ref 0.7–2)
LEUKOCYTE ESTERASE UR-ACNC: ABNORMAL
LYMPHOCYTES # BLD AUTO: 0.69 K/UL — LOW (ref 1.2–3.4)
LYMPHOCYTES # BLD AUTO: 8.6 % — LOW (ref 20.5–51.1)
MCHC RBC-ENTMCNC: 29.2 PG — SIGNIFICANT CHANGE UP (ref 27–31)
MCHC RBC-ENTMCNC: 31.9 G/DL — LOW (ref 32–37)
MCV RBC AUTO: 91.4 FL — SIGNIFICANT CHANGE UP (ref 80–94)
MONOCYTES # BLD AUTO: 0.63 K/UL — HIGH (ref 0.1–0.6)
MONOCYTES NFR BLD AUTO: 7.9 % — SIGNIFICANT CHANGE UP (ref 1.7–9.3)
NEUTROPHILS # BLD AUTO: 6.5 K/UL — SIGNIFICANT CHANGE UP (ref 1.4–6.5)
NEUTROPHILS NFR BLD AUTO: 81.4 % — HIGH (ref 42.2–75.2)
NITRITE UR-MCNC: NEGATIVE — SIGNIFICANT CHANGE UP
NRBC # BLD: 0 /100 WBCS — SIGNIFICANT CHANGE UP (ref 0–0)
NT-PROBNP SERPL-SCNC: 220 PG/ML — SIGNIFICANT CHANGE UP (ref 0–300)
PCO2 BLDV: 50 MMHG — SIGNIFICANT CHANGE UP (ref 42–55)
PH BLDV: 7.36 — SIGNIFICANT CHANGE UP (ref 7.32–7.43)
PH UR: 6.5 — SIGNIFICANT CHANGE UP (ref 5–8)
PLATELET # BLD AUTO: 212 K/UL — SIGNIFICANT CHANGE UP (ref 130–400)
PMV BLD: 11 FL — HIGH (ref 7.4–10.4)
PO2 BLDV: 37 MMHG — SIGNIFICANT CHANGE UP
POTASSIUM BLDV-SCNC: 5.1 MMOL/L — SIGNIFICANT CHANGE UP (ref 3.5–5.1)
POTASSIUM SERPL-MCNC: 5.9 MMOL/L — HIGH (ref 3.5–5)
POTASSIUM SERPL-SCNC: 5.9 MMOL/L — HIGH (ref 3.5–5)
PROT SERPL-MCNC: 6.5 G/DL — SIGNIFICANT CHANGE UP (ref 6–8)
PROT UR-MCNC: SIGNIFICANT CHANGE UP MG/DL
RBC # BLD: 3.84 M/UL — LOW (ref 4.7–6.1)
RBC # FLD: 13.9 % — SIGNIFICANT CHANGE UP (ref 11.5–14.5)
RBC CASTS # UR COMP ASSIST: 0 /HPF — SIGNIFICANT CHANGE UP (ref 0–4)
SAO2 % BLDV: 58.1 % — SIGNIFICANT CHANGE UP
SODIUM SERPL-SCNC: 136 MMOL/L — SIGNIFICANT CHANGE UP (ref 135–146)
SP GR SPEC: 1.02 — SIGNIFICANT CHANGE UP (ref 1–1.03)
SQUAMOUS # UR AUTO: 0 /HPF — SIGNIFICANT CHANGE UP (ref 0–5)
TROPONIN T SERPL-MCNC: <0.01 NG/ML — SIGNIFICANT CHANGE UP
UROBILINOGEN FLD QL: 0.2 MG/DL — SIGNIFICANT CHANGE UP (ref 0.2–1)
WBC # BLD: 7.99 K/UL — SIGNIFICANT CHANGE UP (ref 4.8–10.8)
WBC # FLD AUTO: 7.99 K/UL — SIGNIFICANT CHANGE UP (ref 4.8–10.8)
WBC UR QL: 23 /HPF — HIGH (ref 0–5)

## 2023-08-22 PROCEDURE — 85025 COMPLETE CBC W/AUTO DIFF WBC: CPT

## 2023-08-22 PROCEDURE — 99285 EMERGENCY DEPT VISIT HI MDM: CPT | Mod: FS

## 2023-08-22 PROCEDURE — 83735 ASSAY OF MAGNESIUM: CPT

## 2023-08-22 PROCEDURE — 36415 COLL VENOUS BLD VENIPUNCTURE: CPT

## 2023-08-22 PROCEDURE — 99221 1ST HOSP IP/OBS SF/LOW 40: CPT | Mod: FS

## 2023-08-22 PROCEDURE — 82962 GLUCOSE BLOOD TEST: CPT

## 2023-08-22 PROCEDURE — 71275 CT ANGIOGRAPHY CHEST: CPT | Mod: 26,MA

## 2023-08-22 PROCEDURE — 80048 BASIC METABOLIC PNL TOTAL CA: CPT

## 2023-08-22 PROCEDURE — 93971 EXTREMITY STUDY: CPT | Mod: 26,RT

## 2023-08-22 PROCEDURE — 93010 ELECTROCARDIOGRAM REPORT: CPT

## 2023-08-22 PROCEDURE — 97162 PT EVAL MOD COMPLEX 30 MIN: CPT | Mod: GP

## 2023-08-22 PROCEDURE — 93005 ELECTROCARDIOGRAM TRACING: CPT

## 2023-08-22 PROCEDURE — 80053 COMPREHEN METABOLIC PANEL: CPT

## 2023-08-22 RX ORDER — INSULIN GLARGINE 100 [IU]/ML
25 INJECTION, SOLUTION SUBCUTANEOUS AT BEDTIME
Refills: 0 | Status: DISCONTINUED | OUTPATIENT
Start: 2023-08-22 | End: 2023-08-24

## 2023-08-22 RX ORDER — INSULIN LISPRO 100/ML
7 VIAL (ML) SUBCUTANEOUS
Refills: 0 | Status: DISCONTINUED | OUTPATIENT
Start: 2023-08-22 | End: 2023-08-24

## 2023-08-22 RX ORDER — DEXTROSE 50 % IN WATER 50 %
25 SYRINGE (ML) INTRAVENOUS ONCE
Refills: 0 | Status: DISCONTINUED | OUTPATIENT
Start: 2023-08-22 | End: 2023-08-24

## 2023-08-22 RX ORDER — NIFEDIPINE 30 MG
60 TABLET, EXTENDED RELEASE 24 HR ORAL ONCE
Refills: 0 | Status: DISCONTINUED | OUTPATIENT
Start: 2023-08-22 | End: 2023-08-22

## 2023-08-22 RX ORDER — ENOXAPARIN SODIUM 100 MG/ML
90 INJECTION SUBCUTANEOUS EVERY 12 HOURS
Refills: 0 | Status: DISCONTINUED | OUTPATIENT
Start: 2023-08-22 | End: 2023-08-23

## 2023-08-22 RX ORDER — INSULIN LISPRO 100/ML
VIAL (ML) SUBCUTANEOUS
Refills: 0 | Status: DISCONTINUED | OUTPATIENT
Start: 2023-08-22 | End: 2023-08-24

## 2023-08-22 RX ORDER — ENOXAPARIN SODIUM 100 MG/ML
90 INJECTION SUBCUTANEOUS ONCE
Refills: 0 | Status: COMPLETED | OUTPATIENT
Start: 2023-08-22 | End: 2023-08-22

## 2023-08-22 RX ORDER — LOSARTAN POTASSIUM 100 MG/1
50 TABLET, FILM COATED ORAL DAILY
Refills: 0 | Status: DISCONTINUED | OUTPATIENT
Start: 2023-08-22 | End: 2023-08-22

## 2023-08-22 RX ORDER — DEXTROSE 50 % IN WATER 50 %
12.5 SYRINGE (ML) INTRAVENOUS ONCE
Refills: 0 | Status: DISCONTINUED | OUTPATIENT
Start: 2023-08-22 | End: 2023-08-24

## 2023-08-22 RX ORDER — DEXTROSE 50 % IN WATER 50 %
15 SYRINGE (ML) INTRAVENOUS ONCE
Refills: 0 | Status: DISCONTINUED | OUTPATIENT
Start: 2023-08-22 | End: 2023-08-24

## 2023-08-22 RX ORDER — SODIUM CHLORIDE 9 MG/ML
1000 INJECTION, SOLUTION INTRAVENOUS
Refills: 0 | Status: DISCONTINUED | OUTPATIENT
Start: 2023-08-22 | End: 2023-08-24

## 2023-08-22 RX ORDER — PANTOPRAZOLE SODIUM 20 MG/1
40 TABLET, DELAYED RELEASE ORAL
Refills: 0 | Status: DISCONTINUED | OUTPATIENT
Start: 2023-08-22 | End: 2023-08-24

## 2023-08-22 RX ORDER — GLUCAGON INJECTION, SOLUTION 0.5 MG/.1ML
1 INJECTION, SOLUTION SUBCUTANEOUS ONCE
Refills: 0 | Status: DISCONTINUED | OUTPATIENT
Start: 2023-08-22 | End: 2023-08-24

## 2023-08-22 RX ADMIN — ENOXAPARIN SODIUM 90 MILLIGRAM(S): 100 INJECTION SUBCUTANEOUS at 17:58

## 2023-08-22 NOTE — ED PROVIDER NOTE - PHYSICAL EXAMINATION
CONSTITUTIONAL: Well-developed; well-nourished; in no acute distress, nontoxic appearing  SKIN: skin exam is warm and dry  ENT: MMM   CARD: S1, S2 normal, no murmur  RESP: No wheezes, rales or rhonchi. Good air movement bilaterally   EXT: RUE: +swelling/open wounds to right upper extremity, no weeping/draianage, no erythema/warmth/bleeding. pulses 2+.    NEURO: awake, alert, following commands, oriented, grossly unremarkable. No Focal deficits. GCS 15.   PSYCH: Cooperative, appropriate.

## 2023-08-22 NOTE — CONSULT NOTE ADULT - SUBJECTIVE AND OBJECTIVE BOX
Patient is a 73y old  Male who presents with a chief complaint of RUE pain/swelling.    Vital Signs Last 24 Hrs  T(C): 36.7 (22 Aug 2023 15:46), Max: 36.7 (22 Aug 2023 15:46)  T(F): 98.1 (22 Aug 2023 15:46), Max: 98.1 (22 Aug 2023 15:46)  HR: 77 (22 Aug 2023 15:46) (77 - 77)  BP: 209/98 (22 Aug 2023 15:46) (209/98 - 209/98)  RR: 17 (22 Aug 2023 15:46) (17 - 17)  SpO2: 96% (22 Aug 2023 15:46) (96% - 96%)    O2 Parameters below as of 22 Aug 2023 15:46  Patient On (Oxygen Delivery Method): room air    LABS:                        11.2   7.99  )-----------( 212      ( 22 Aug 2023 16:58 )             35.1     08-22    136  |  102  |  14  ----------------------------<  450<HH>  5.9<H>   |  22  |  1.0    Ca    9.0      22 Aug 2023 16:58    TPro  6.5  /  Alb  3.7  /  TBili  0.3  /  DBili  x   /  AST  37  /  ALT  23  /  AlkPhos  108  08-22  Urinalysis Basic - ( 22 Aug 2023 16:58 )      PHYSICAL EXAM:  General: Pt lying in stretcher, calm and cooperative  Skin: scattered partial thickness wounds to RUE, some areas pink, healed and dry, proximal healing area with dry yellow exudate overlying. No erythema, active bleeding or drainage.    + dressing applied with xeroform

## 2023-08-22 NOTE — CONSULT NOTE ADULT - ASSESSMENT
#RUE partial thickness wounds present on last admission    - wounds are mostly healed  - most likely due from RUE swelling (hx of DVT)  - no burn surgical intervention at this time  - continue local wound care daily- wash with soap and water, apply xeroform, wrap with cling.  - signing off, recall prn

## 2023-08-22 NOTE — CONSULT NOTE ADULT - SUBJECTIVE AND OBJECTIVE BOX
Patient is a 73y old  Male who presents with a chief complaint of     HPI: 73 year old male, past medical history dm, htn, hdl, seizures, bipolar disorder, bph, gerd, om s/p multiple toe amputations, who presents with rue swelling/pain. patient with recent admission for dka/pna, found to have rue bullae s/p debridement by burn dc on 8/16. patiet reports persistent pain to rue with swelling. denies f/c, chest pain, shortness of breath, numbness/weakness.    Patient denies fevers/chills, denies lightheadedness/dizziness, denies SOB/chest pain, denies nausea/vomiting, denies constipation/diarrhea.      ROS: 10-system review is otherwise negative except HPI above.      PAST MEDICAL & SURGICAL HISTORY:  DM (diabetes mellitus)  Type 2, 12/27/18 hgb aic  11.2      HTN (hypertension)      Dyslipidemia      Diabetic foot ulcer      Depression      GERD (gastroesophageal reflux disease)      Neuropathy, diabetic      Toe amputation status, right  (2008)      History of amputation of toe  LT -partial 1st toe        FAMILY HISTORY:  Family history of lung cancer (Mother)    Family history of ischemic heart disease (IHD) (Father)      [] Family history not pertinent as reviewed with the patient and family    SOCIAL HISTORY:  ***    ALLERGIES: No Known Allergies      HOME MEDICATIONS:  ***    CURRENT MEDICATIONS  MEDICATIONS (STANDING): enoxaparin Injectable 90 milliGRAM(s) SubCutaneous Once    MEDICATIONS (PRN):  --------------------------------------------------------------------------------------------    Vitals:   T(C): 36.7 (08-22-23 @ 15:46), Max: 36.7 (08-22-23 @ 15:46)  HR: 77 (08-22-23 @ 15:46) (77 - 77)  BP: 209/98 (08-22-23 @ 15:46) (209/98 - 209/98)  RR: 17 (08-22-23 @ 15:46) (17 - 17)  SpO2: 96% (08-22-23 @ 15:46) (96% - 96%)  CAPILLARY BLOOD GLUCOSE        CAPILLARY BLOOD GLUCOSE          Height (cm): 180.3 (08-22 @ 15:46)  Weight (kg): 94.8 (08-22 @ 15:46)  BMI (kg/m2): 29.2 (08-22 @ 15:46)  BSA (m2): 2.15 (08-22 @ 15:46)    PHYSICAL EXAM: ***  General: NAD, Lying in bed comfortably  Neuro: AAOx3  HEENT: PERRL, EOMI  Cardio: RRR, nml S1/S2  Resp: Good effort, CTA b/l  Thorax: No chest wall tenderness  GI/Abd: Soft, NT/ND, no rebound/guarding.   Vascular: Swollen RUE   Skin: healing bullae RUE   Lymphatic/Nodes: No palpable lymphadenopathy  Musculoskeletal: All 4 extremities moving spontaneously, no limitations.     --------------------------------------------------------------------------------------------    LABS  CBC (08-22 @ 16:58)                              11.2<L>                         7.99    )----------------(  212        81.4<H>% Neutrophils, 8.6<L>% Lymphocytes, ANC: 6.50                                35.1<L>    BMP (08-22 @ 16:58)             136     |  102     |  14    		Ca++ --      Ca 9.0                ---------------------------------( 450<HH>		Mg --                 5.9<H>  |  22      |  1.0   			Ph --        LFTs (08-22 @ 16:58)      TPro 6.5 / Alb 3.7 / TBili 0.3 / DBili -- / AST 37 / ALT 23 / AlkPhos 108        ABG (08-22 @ 16:58)      /  /  /  /  / %     Lactate:  1.4      --------------------------------------------------------------------------------------------    MICROBIOLOGY  Urinalysis (08-22 @ 16:58):     Color:  / Appearance:  / SG:  / pH:  / Gluc: 450<HH> / Ketones:  / Bili:  / Urobili:  / Protein : / Nitrites:  / Leuk.Est:  / RBC:  / WBC:  / Sq Epi:  / Non Sq Epi:  / Bacteria          --------------------------------------------------------------------------------------------    IMAGING       Patient is a 73y old  Male who presents with a chief complaint of     HPI: 73 year old male, past medical history dm, htn, hdl, seizures, bipolar disorder, bph, gerd, om s/p multiple toe amputations, who presents with rue swelling/pain. patient with recent admission for dka/pna, found to have rue bullae s/p debridement by burn dc on 8/16. patiet reports persistent pain to rue with swelling. denies f/c, chest pain, shortness of breath, numbness/weakness.    Patient denies fevers/chills, denies lightheadedness/dizziness, denies SOB/chest pain, denies nausea/vomiting, denies constipation/diarrhea.      ROS: 10-system review is otherwise negative except HPI above.      PAST MEDICAL & SURGICAL HISTORY:  DM (diabetes mellitus)  Type 2, 12/27/18 hgb aic  11.2      HTN (hypertension)      Dyslipidemia      Diabetic foot ulcer      Depression      GERD (gastroesophageal reflux disease)      Neuropathy, diabetic      Toe amputation status, right  (2008)      History of amputation of toe  LT -partial 1st toe        FAMILY HISTORY:  Family history of lung cancer (Mother)    Family history of ischemic heart disease (IHD) (Father)      [] Family history not pertinent as reviewed with the patient and family    SOCIAL HISTORY:  ***    ALLERGIES: No Known Allergies      HOME MEDICATIONS:  ***    CURRENT MEDICATIONS  MEDICATIONS (STANDING): enoxaparin Injectable 90 milliGRAM(s) SubCutaneous Once    MEDICATIONS (PRN):  --------------------------------------------------------------------------------------------    Vitals:   T(C): 36.7 (08-22-23 @ 15:46), Max: 36.7 (08-22-23 @ 15:46)  HR: 77 (08-22-23 @ 15:46) (77 - 77)  BP: 209/98 (08-22-23 @ 15:46) (209/98 - 209/98)  RR: 17 (08-22-23 @ 15:46) (17 - 17)  SpO2: 96% (08-22-23 @ 15:46) (96% - 96%)  CAPILLARY BLOOD GLUCOSE        CAPILLARY BLOOD GLUCOSE          Height (cm): 180.3 (08-22 @ 15:46)  Weight (kg): 94.8 (08-22 @ 15:46)  BMI (kg/m2): 29.2 (08-22 @ 15:46)  BSA (m2): 2.15 (08-22 @ 15:46)    PHYSICAL EXAM: ***  General: NAD, Lying in bed comfortably  Neuro: AAOx3  HEENT: PERRL, EOMI  Cardio: RRR, nml S1/S2  Resp: Good effort, CTA b/l  Thorax: No chest wall tenderness  GI/Abd: Soft, NT/ND, no rebound/guarding.   Vascular: RUE swelling noted, pulses present, motor and sensation intact.   Skin: healing bullae RUE   Lymphatic/Nodes: No palpable lymphadenopathy  Musculoskeletal: All 4 extremities moving spontaneously, no limitations.     --------------------------------------------------------------------------------------------    LABS  CBC (08-22 @ 16:58)                              11.2<L>                         7.99    )----------------(  212        81.4<H>% Neutrophils, 8.6<L>% Lymphocytes, ANC: 6.50                                35.1<L>    BMP (08-22 @ 16:58)             136     |  102     |  14    		Ca++ --      Ca 9.0                ---------------------------------( 450<HH>		Mg --                 5.9<H>  |  22      |  1.0   			Ph --        LFTs (08-22 @ 16:58)      TPro 6.5 / Alb 3.7 / TBili 0.3 / DBili -- / AST 37 / ALT 23 / AlkPhos 108        ABG (08-22 @ 16:58)      /  /  /  /  / %     Lactate:  1.4      --------------------------------------------------------------------------------------------    MICROBIOLOGY  Urinalysis (08-22 @ 16:58):     Color:  / Appearance:  / SG:  / pH:  / Gluc: 450<HH> / Ketones:  / Bili:  / Urobili:  / Protein : / Nitrites:  / Leuk.Est:  / RBC:  / WBC:  / Sq Epi:  / Non Sq Epi:  / Bacteria          --------------------------------------------------------------------------------------------    IMAGING

## 2023-08-22 NOTE — ED ADULT NURSE NOTE - PAIN: PRESENCE, MLM
Alba Morse    [address]  [place] [address]    November 1, 2017    Alba called into the office reporting she had nausea and diarrhea today and was unable to come into work.        MD Hugo Keene MD  [address] [place] [id number]    ***   complains of pain/discomfort

## 2023-08-22 NOTE — ED PROVIDER NOTE - PROGRESS NOTE DETAILS
burn consulted lovenox ordered. vasc consulted Dr. Ross: talked with burn. They state that wound looks improved and recommend normal dressing changes. Dr. Ross: sign out to Dr. Childress   pending CTA and admit EP: CTA chest done, no additional clots found, patient was evaluated by burn and vascular surgery services, admitted for further management and care.

## 2023-08-22 NOTE — ED ADULT NURSE NOTE - OBJECTIVE STATEMENT
72 y/o male reports he was discharged from the hospital on Thursday w/ right arm swelling & pain. Pain & swelling did not subside. No fever. Pt had IV catheter to the right arm during hospital stay.

## 2023-08-22 NOTE — ED ADULT TRIAGE NOTE - ACCOMPANIED BY
[FreeTextEntry1] : 22 yo M presents for abnormal anal pap\par Initial pap 03/2019 ASCUS w/ HR HPV\par Sexual debut 01/2018, diagnosed RPR and CT and treated\par \par MSM, HIV (-), anal insertive & receptive sex, reports spasm pain for less than 5 min after anal insertive sex\par Completed Gardasil vaccine 2014\par Denies anal discharge, BPR.\par BH: twice daily, formed\par  Spouse/Significant other

## 2023-08-22 NOTE — ED ADULT NURSE NOTE - NSFALLRISKINTERV_ED_ALL_ED

## 2023-08-22 NOTE — PATIENT PROFILE ADULT - FALL HARM RISK - HARM RISK INTERVENTIONS
Assistance with ambulation/Assistance OOB with selected safe patient handling equipment/Communicate Risk of Fall with Harm to all staff/Discuss with provider need for PT consult/Monitor gait and stability/Provide patient with walking aids - walker, cane, crutches/Reinforce activity limits and safety measures with patient and family/Tailored Fall Risk Interventions/Use of alarms - bed, chair and/or voice tab/Visual Cue: Yellow wristband and red socks/Bed in lowest position, wheels locked, appropriate side rails in place/Call bell, personal items and telephone in reach/Instruct patient to call for assistance before getting out of bed or chair/Non-slip footwear when patient is out of bed/[place] to call system/Physically safe environment - no spills, clutter or unnecessary equipment/Purposeful Proactive Rounding/Room/bathroom lighting operational, light cord in reach

## 2023-08-22 NOTE — ED PROVIDER NOTE - CLINICAL SUMMARY MEDICAL DECISION MAKING FREE TEXT BOX
73-year-old male with arm bullae and subclavian DVT.  Anticoagulation started, management discussed with parent and vascular services, patient admitted for further management and care.

## 2023-08-22 NOTE — CONSULT NOTE ADULT - ASSESSMENT
ASSESSMENT: Patient is a 73 year old male, past medical history dm, htn, hdl, seizures, bipolar disorder, bph, gerd, om s/p multiple toe amputations, who presents with rue swelling/pain. patient with recent admission for dka/pna, found to have rue bullae s/p debridement by burn dc on 8/16. patiet reports persistent pain to rue with swelling. denies f/c, chest pain, shortness of breath, numbness/weakness.    on physical exam RUE swelling noted.       PLAN:   -   -   -   -   - Patient seen/examined or Plan Discussed with Fellow,    - Plan to be discussed with Attending,       ASSESSMENT: Patient is a 73 year old male, past medical history dm, htn, hdl, seizures, bipolar disorder, bph, gerd, om s/p multiple toe amputations, who presents with rue swelling/pain. patient with recent admission for dka/pna, found to have rue bullae s/p debridement by burn dc on 8/16. patiet reports persistent pain to rue with swelling. denies f/c, chest pain, shortness of breath, numbness/weakness.    on physical exam RUE swelling noted, pulses present, motor and sensation intact.       PLAN:   - F/U CTA, NITHIN DPLX FINAL READ  - cont lvx  - Patient seen/examined or Plan Discussed with Fellow, Dr. Newby  - Plan to be discussed with Attending, Dr. Burns     ASSESSMENT: Patient is a 73 year old male, past medical history dm, htn, hdl, seizures, bipolar disorder, bph, gerd, om s/p multiple toe amputations, who presents with rue swelling/pain. patient with recent admission for dka/pna, found to have rue bullae s/p debridement by burn dc on 8/16. patiet reports persistent pain to rue with swelling. denies f/c, chest pain, shortness of breath, numbness/weakness.    on physical exam RUE swelling noted, pulses present, motor and sensation intact.       PLAN:   - F/U CTA, NITHIN DPLX FINAL READ  - cont therapeutic AC  - Patient seen/examined or Plan Discussed with Fellow, Dr. Newby  - Plan to be discussed with Attending, Dr. Burns

## 2023-08-22 NOTE — ED ADULT TRIAGE NOTE - CHIEF COMPLAINT QUOTE
As per pt, he was discharged from the hospital on Thursday w/ right arm swelling & pain. Pain & swelling did not subside. No fever. Pt had IV catheter to the right arm during hospital stay.

## 2023-08-22 NOTE — ED PROVIDER NOTE - OBJECTIVE STATEMENT
73 year old male, past medical history dm, htn, hdl, seizures, bipolar disorder, bph, gerd, om s/p multiple toe amputations, who presents with rue swelling/pain. patient with recent admission for dka/pna, found to have rue bullae s/p debridement by burn dc on 8/16. patiet reports persistent pain to rue with swelling. denies f/c, chest pain, shortness of breath, numbness/weakness.

## 2023-08-22 NOTE — ED PROVIDER NOTE - CARE PLAN
1 Principal Discharge DX:	Acute deep vein thrombosis (DVT) of right upper extremity  Secondary Diagnosis:	Hyperglycemia

## 2023-08-22 NOTE — ED ADULT NURSE NOTE - NSFALLHARMRISKINTERV_ED_ALL_ED

## 2023-08-22 NOTE — PATIENT PROFILE ADULT - FUNCTIONAL ASSESSMENT - BASIC MOBILITY ASSESSMENT TYPE
Admission During the surgery, the patient reported that he had a hip replacement less than 2 years ago. Cephalexin 2 grams was immediately given and a prescription for a 10 day course of cephalexin was sent to the pharmacy. Later, the patient's wife reported that the patient was incorrect and his hip replacement was 5+ years ago. The patient was instructed not to continue his cephalexin. Detail Level: Zone

## 2023-08-22 NOTE — ED PROVIDER NOTE - ATTENDING APP SHARED VISIT CONTRIBUTION OF CARE
72 yo M with PMH of DM, HTN, HLD, seizures, bipolar, BPH, GERD presents to the ED for RUE swelling. Patient was admitted to the floor for acute metabolic encephalopathy, aspiration pneumonia, DKA/HHS and right upper extremities swelling and bulla due to recent IV use and was discharged on August 16.  Patient is accompanied by family member saying that the right arm is still swollen and painful.  No fever no chills no pus.  They have been changing the dressings as instructed.    Const: NAD  Eyes: PERRL, no conjunctival injection  HENT:  Neck supple without meningismus   CV: RRR, Warm, well-perfused extremities  RESP: CTA B/L, no tachypnea   GI: soft, non-tender, non-distended  MSK/skin: RUE swelling with healing blisters. Radial pulse intact. No crepitus.   Neuro: Alert, CNs II-XII grossly intact. Sensation and motor function of extremities grossly intact.  Psych: Appropriate mood and affect.    labs, US

## 2023-08-22 NOTE — ED PROVIDER NOTE - CADM POA PRESS ULCER
Patient reported to the emergency room on 1/24 with reports of bright red blood per rectum. 2 units PRBC's, 2 liters NS given in the ED. Patient became agitated and diaphoretic overnight with subsequent decreased level of consciousness. Decision was made to intubate, shortly after patient went into PEA arrest with return of ROSC after a code blue was called. 2 units of PRBCs given overnight. Patient went into PEA arrest 3 more times throughout the night with return of ROSC. Post arrest patient remained hypotensive, bradycardic despite being on multiple pressors. Code fusion was called. Patient received 11 units of PRBCs, 4FFP and 1 units of platelets. Regular insulin and D50, as well as calcium gluconate was given for hyperkalemia. Patient remained hypotensive and was visibly hemorrhaging out of his rectum. Patient also had a reported seizure post cardiac arrest, 2mg IVP ativan given. Pupils were both fixed an dilated. Decision was made to suspend further life saving measures as patient was non-responsive to resuscitation efforts. Cardiopulmonary death was confirmed at 1021. Daughter Carmela and girlfriend Twila aware.
No

## 2023-08-23 ENCOUNTER — TRANSCRIPTION ENCOUNTER (OUTPATIENT)
Age: 73
End: 2023-08-23

## 2023-08-23 DIAGNOSIS — T80.89XA OTHER COMPLICATIONS FOLLOWING INFUSION, TRANSFUSION AND THERAPEUTIC INJECTION, INITIAL ENCOUNTER: ICD-10-CM

## 2023-08-23 DIAGNOSIS — Z79.51 LONG TERM (CURRENT) USE OF INHALED STEROIDS: ICD-10-CM
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
## 2023-08-23 DIAGNOSIS — Z79.84 LONG TERM (CURRENT) USE OF ORAL HYPOGLYCEMIC DRUGS: ICD-10-CM

## 2023-08-23 DIAGNOSIS — E11.00 TYPE 2 DIABETES MELLITUS WITH HYPEROSMOLARITY WITHOUT NONKETOTIC HYPERGLYCEMIC-HYPEROSMOLAR COMA (NKHHC): ICD-10-CM

## 2023-08-23 DIAGNOSIS — E11.22 TYPE 2 DIABETES MELLITUS WITH DIABETIC CHRONIC KIDNEY DISEASE: ICD-10-CM

## 2023-08-23 DIAGNOSIS — J69.0 PNEUMONITIS DUE TO INHALATION OF FOOD AND VOMIT: ICD-10-CM

## 2023-08-23 DIAGNOSIS — Z79.82 LONG TERM (CURRENT) USE OF ASPIRIN: ICD-10-CM

## 2023-08-23 DIAGNOSIS — G93.41 METABOLIC ENCEPHALOPATHY: ICD-10-CM

## 2023-08-23 DIAGNOSIS — E11.40 TYPE 2 DIABETES MELLITUS WITH DIABETIC NEUROPATHY, UNSPECIFIED: ICD-10-CM

## 2023-08-23 DIAGNOSIS — K21.9 GASTRO-ESOPHAGEAL REFLUX DISEASE WITHOUT ESOPHAGITIS: ICD-10-CM

## 2023-08-23 DIAGNOSIS — E11.10 TYPE 2 DIABETES MELLITUS WITH KETOACIDOSIS WITHOUT COMA: ICD-10-CM

## 2023-08-23 DIAGNOSIS — N17.9 ACUTE KIDNEY FAILURE, UNSPECIFIED: ICD-10-CM

## 2023-08-23 DIAGNOSIS — Z91.148 PATIENT'S OTHER NONCOMPLIANCE WITH MEDICATION REGIMEN FOR OTHER REASON: ICD-10-CM

## 2023-08-23 DIAGNOSIS — Z89.422 ACQUIRED ABSENCE OF OTHER LEFT TOE(S): ICD-10-CM

## 2023-08-23 DIAGNOSIS — F31.9 BIPOLAR DISORDER, UNSPECIFIED: ICD-10-CM

## 2023-08-23 DIAGNOSIS — N40.0 BENIGN PROSTATIC HYPERPLASIA WITHOUT LOWER URINARY TRACT SYMPTOMS: ICD-10-CM

## 2023-08-23 DIAGNOSIS — Z78.1 PHYSICAL RESTRAINT STATUS: ICD-10-CM

## 2023-08-23 DIAGNOSIS — I12.9 HYPERTENSIVE CHRONIC KIDNEY DISEASE WITH STAGE 1 THROUGH STAGE 4 CHRONIC KIDNEY DISEASE, OR UNSPECIFIED CHRONIC KIDNEY DISEASE: ICD-10-CM

## 2023-08-23 DIAGNOSIS — R23.8 OTHER SKIN CHANGES: ICD-10-CM

## 2023-08-23 DIAGNOSIS — G40.909 EPILEPSY, UNSPECIFIED, NOT INTRACTABLE, WITHOUT STATUS EPILEPTICUS: ICD-10-CM
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
## 2023-08-23 DIAGNOSIS — D69.6 THROMBOCYTOPENIA, UNSPECIFIED: ICD-10-CM

## 2023-08-23 DIAGNOSIS — J96.01 ACUTE RESPIRATORY FAILURE WITH HYPOXIA: ICD-10-CM

## 2023-08-23 DIAGNOSIS — N18.9 CHRONIC KIDNEY DISEASE, UNSPECIFIED: ICD-10-CM

## 2023-08-23 DIAGNOSIS — I82.611 ACUTE EMBOLISM AND THROMBOSIS OF SUPERFICIAL VEINS OF RIGHT UPPER EXTREMITY: ICD-10-CM

## 2023-08-23 DIAGNOSIS — Z79.85 LONG-TERM (CURRENT) USE OF INJECTABLE NON-INSULIN ANTIDIABETIC DRUGS: ICD-10-CM

## 2023-08-23 DIAGNOSIS — Z89.421 ACQUIRED ABSENCE OF OTHER RIGHT TOE(S): ICD-10-CM

## 2023-08-23 LAB
ANION GAP SERPL CALC-SCNC: 12 MMOL/L — SIGNIFICANT CHANGE UP (ref 7–14)
BASOPHILS # BLD AUTO: 0.04 K/UL — SIGNIFICANT CHANGE UP (ref 0–0.2)
BASOPHILS NFR BLD AUTO: 0.7 % — SIGNIFICANT CHANGE UP (ref 0–1)
BUN SERPL-MCNC: 11 MG/DL — SIGNIFICANT CHANGE UP (ref 10–20)
CALCIUM SERPL-MCNC: 9.2 MG/DL — SIGNIFICANT CHANGE UP (ref 8.4–10.5)
CHLORIDE SERPL-SCNC: 106 MMOL/L — SIGNIFICANT CHANGE UP (ref 98–110)
CO2 SERPL-SCNC: 24 MMOL/L — SIGNIFICANT CHANGE UP (ref 17–32)
CREAT SERPL-MCNC: 0.7 MG/DL — SIGNIFICANT CHANGE UP (ref 0.7–1.5)
EGFR: 96 ML/MIN/1.73M2 — SIGNIFICANT CHANGE UP
EOSINOPHIL # BLD AUTO: 0.06 K/UL — SIGNIFICANT CHANGE UP (ref 0–0.7)
EOSINOPHIL NFR BLD AUTO: 1 % — SIGNIFICANT CHANGE UP (ref 0–8)
GLUCOSE BLDC GLUCOMTR-MCNC: 103 MG/DL — HIGH (ref 70–99)
GLUCOSE BLDC GLUCOMTR-MCNC: 120 MG/DL — HIGH (ref 70–99)
GLUCOSE BLDC GLUCOMTR-MCNC: 161 MG/DL — HIGH (ref 70–99)
GLUCOSE BLDC GLUCOMTR-MCNC: 165 MG/DL — HIGH (ref 70–99)
GLUCOSE BLDC GLUCOMTR-MCNC: 63 MG/DL — LOW (ref 70–99)
GLUCOSE SERPL-MCNC: 136 MG/DL — HIGH (ref 70–99)
HCT VFR BLD CALC: 34.1 % — LOW (ref 42–52)
HGB BLD-MCNC: 11.1 G/DL — LOW (ref 14–18)
IMM GRANULOCYTES NFR BLD AUTO: 1.1 % — HIGH (ref 0.1–0.3)
LYMPHOCYTES # BLD AUTO: 1.12 K/UL — LOW (ref 1.2–3.4)
LYMPHOCYTES # BLD AUTO: 18.2 % — LOW (ref 20.5–51.1)
MAGNESIUM SERPL-MCNC: 1.7 MG/DL — LOW (ref 1.8–2.4)
MCHC RBC-ENTMCNC: 29.1 PG — SIGNIFICANT CHANGE UP (ref 27–31)
MCHC RBC-ENTMCNC: 32.6 G/DL — SIGNIFICANT CHANGE UP (ref 32–37)
MCV RBC AUTO: 89.3 FL — SIGNIFICANT CHANGE UP (ref 80–94)
MONOCYTES # BLD AUTO: 0.58 K/UL — SIGNIFICANT CHANGE UP (ref 0.1–0.6)
MONOCYTES NFR BLD AUTO: 9.4 % — HIGH (ref 1.7–9.3)
NEUTROPHILS # BLD AUTO: 4.27 K/UL — SIGNIFICANT CHANGE UP (ref 1.4–6.5)
NEUTROPHILS NFR BLD AUTO: 69.6 % — SIGNIFICANT CHANGE UP (ref 42.2–75.2)
NRBC # BLD: 0 /100 WBCS — SIGNIFICANT CHANGE UP (ref 0–0)
PLATELET # BLD AUTO: 270 K/UL — SIGNIFICANT CHANGE UP (ref 130–400)
PMV BLD: 11.2 FL — HIGH (ref 7.4–10.4)
POTASSIUM SERPL-MCNC: 3.8 MMOL/L — SIGNIFICANT CHANGE UP (ref 3.5–5)
POTASSIUM SERPL-SCNC: 3.8 MMOL/L — SIGNIFICANT CHANGE UP (ref 3.5–5)
RBC # BLD: 3.82 M/UL — LOW (ref 4.7–6.1)
RBC # FLD: 13.7 % — SIGNIFICANT CHANGE UP (ref 11.5–14.5)
SODIUM SERPL-SCNC: 142 MMOL/L — SIGNIFICANT CHANGE UP (ref 135–146)
WBC # BLD: 6.14 K/UL — SIGNIFICANT CHANGE UP (ref 4.8–10.8)
WBC # FLD AUTO: 6.14 K/UL — SIGNIFICANT CHANGE UP (ref 4.8–10.8)

## 2023-08-23 PROCEDURE — 99223 1ST HOSP IP/OBS HIGH 75: CPT

## 2023-08-23 PROCEDURE — 99231 SBSQ HOSP IP/OBS SF/LOW 25: CPT | Mod: FS

## 2023-08-23 RX ORDER — SENNA PLUS 8.6 MG/1
2 TABLET ORAL AT BEDTIME
Refills: 0 | Status: DISCONTINUED | OUTPATIENT
Start: 2023-08-23 | End: 2023-08-24

## 2023-08-23 RX ORDER — MAGNESIUM SULFATE 500 MG/ML
2 VIAL (ML) INJECTION ONCE
Refills: 0 | Status: COMPLETED | OUTPATIENT
Start: 2023-08-23 | End: 2023-08-23

## 2023-08-23 RX ORDER — HYDRALAZINE HCL 50 MG
25 TABLET ORAL ONCE
Refills: 0 | Status: COMPLETED | OUTPATIENT
Start: 2023-08-23 | End: 2023-08-23

## 2023-08-23 RX ORDER — APIXABAN 2.5 MG/1
10 TABLET, FILM COATED ORAL
Refills: 0 | Status: DISCONTINUED | OUTPATIENT
Start: 2023-08-23 | End: 2023-08-23

## 2023-08-23 RX ORDER — LOSARTAN POTASSIUM 100 MG/1
50 TABLET, FILM COATED ORAL DAILY
Refills: 0 | Status: DISCONTINUED | OUTPATIENT
Start: 2023-08-23 | End: 2023-08-23

## 2023-08-23 RX ORDER — APIXABAN 2.5 MG/1
2 TABLET, FILM COATED ORAL
Qty: 60 | Refills: 0
Start: 2023-08-23

## 2023-08-23 RX ORDER — ASPIRIN/CALCIUM CARB/MAGNESIUM 324 MG
81 TABLET ORAL DAILY
Refills: 0 | Status: DISCONTINUED | OUTPATIENT
Start: 2023-08-23 | End: 2023-08-24

## 2023-08-23 RX ORDER — ALBUTEROL 90 UG/1
2 AEROSOL, METERED ORAL EVERY 6 HOURS
Refills: 0 | Status: DISCONTINUED | OUTPATIENT
Start: 2023-08-23 | End: 2023-08-24

## 2023-08-23 RX ORDER — APIXABAN 2.5 MG/1
10 TABLET, FILM COATED ORAL
Refills: 0 | Status: DISCONTINUED | OUTPATIENT
Start: 2023-08-23 | End: 2023-08-24

## 2023-08-23 RX ORDER — LOSARTAN POTASSIUM 100 MG/1
50 TABLET, FILM COATED ORAL DAILY
Refills: 0 | Status: DISCONTINUED | OUTPATIENT
Start: 2023-08-23 | End: 2023-08-24

## 2023-08-23 RX ORDER — NIFEDIPINE 30 MG
60 TABLET, EXTENDED RELEASE 24 HR ORAL DAILY
Refills: 0 | Status: DISCONTINUED | OUTPATIENT
Start: 2023-08-23 | End: 2023-08-24

## 2023-08-23 RX ORDER — LEVETIRACETAM 250 MG/1
1000 TABLET, FILM COATED ORAL
Refills: 0 | Status: DISCONTINUED | OUTPATIENT
Start: 2023-08-23 | End: 2023-08-24

## 2023-08-23 RX ORDER — LACOSAMIDE 50 MG/1
100 TABLET ORAL
Refills: 0 | Status: DISCONTINUED | OUTPATIENT
Start: 2023-08-23 | End: 2023-08-24

## 2023-08-23 RX ADMIN — Medication 81 MILLIGRAM(S): at 11:29

## 2023-08-23 RX ADMIN — LEVETIRACETAM 1000 MILLIGRAM(S): 250 TABLET, FILM COATED ORAL at 06:34

## 2023-08-23 RX ADMIN — Medication 60 MILLIGRAM(S): at 06:34

## 2023-08-23 RX ADMIN — INSULIN GLARGINE 25 UNIT(S): 100 INJECTION, SOLUTION SUBCUTANEOUS at 22:27

## 2023-08-23 RX ADMIN — SENNA PLUS 2 TABLET(S): 8.6 TABLET ORAL at 21:19

## 2023-08-23 RX ADMIN — PANTOPRAZOLE SODIUM 40 MILLIGRAM(S): 20 TABLET, DELAYED RELEASE ORAL at 06:34

## 2023-08-23 RX ADMIN — Medication 25 MILLIGRAM(S): at 00:35

## 2023-08-23 RX ADMIN — LACOSAMIDE 100 MILLIGRAM(S): 50 TABLET ORAL at 17:37

## 2023-08-23 RX ADMIN — Medication 25 GRAM(S): at 11:29

## 2023-08-23 RX ADMIN — Medication 1 APPLICATION(S): at 17:38

## 2023-08-23 RX ADMIN — LOSARTAN POTASSIUM 50 MILLIGRAM(S): 100 TABLET, FILM COATED ORAL at 11:29

## 2023-08-23 RX ADMIN — Medication 7 UNIT(S): at 16:36

## 2023-08-23 RX ADMIN — Medication 7 UNIT(S): at 08:02

## 2023-08-23 RX ADMIN — Medication 2: at 16:36

## 2023-08-23 RX ADMIN — LACOSAMIDE 100 MILLIGRAM(S): 50 TABLET ORAL at 06:34

## 2023-08-23 RX ADMIN — ENOXAPARIN SODIUM 90 MILLIGRAM(S): 100 INJECTION SUBCUTANEOUS at 06:35

## 2023-08-23 RX ADMIN — INSULIN GLARGINE 25 UNIT(S): 100 INJECTION, SOLUTION SUBCUTANEOUS at 00:21

## 2023-08-23 RX ADMIN — APIXABAN 10 MILLIGRAM(S): 2.5 TABLET, FILM COATED ORAL at 17:38

## 2023-08-23 RX ADMIN — Medication 1 APPLICATION(S): at 06:33

## 2023-08-23 RX ADMIN — LEVETIRACETAM 1000 MILLIGRAM(S): 250 TABLET, FILM COATED ORAL at 17:38

## 2023-08-23 NOTE — H&P ADULT - ATTENDING COMMENTS
Patient seen and examined at bedside independently of the residents. I read the resident's note and agree with the plan with the additions and corrections as noted below. My note supersedes the resident's note.     REVIEW OF SYSTEMS:  Negative except in HPI.    PMH: HTN, HLD, DM II, Seizure disorder, Bipolar Disorder, BPH, GERD, hx of Diabetic foot w/ OM s/p multiple toe amputations, and recently discharged from hospital after being treated for DKA    FHx: Reviewed. No fhx of asthma/copd, No fhx of liver and pulmonary disease. No fhx of hematological disorder.     Physical Exam:  GEN: No acute distress. Awake, Alert and oriented x 3.   Head: Atraumatic, Normocephalic.   Eye: PEERLA. No sclera icterus. EOMI.   ENT: Normal oropharynx, no thyromegaly, no mass, no lymphadenopathy.   LUNGS: Clear to auscultation bilaterally. No wheeze/rales/crackles.   HEART: Normal. S1/S2 present. RRR. No murmur/gallops.   ABD: Soft, non-tender, non-distended. Bowel sounds present.   EXT: No pitting edema. No erythema. RUE swelling and tenderness.   Integumentary: No rash, No sore, No petechia.   NEURO: CN III-XII intact. Strength: 5/5 b/l ULE. Sensory intact b/l ULE.     Vital Signs Last 24 Hrs  T(C): 35 (22 Aug 2023 23:36), Max: 36.7 (22 Aug 2023 15:46)  T(F): 95 (22 Aug 2023 23:36), Max: 98.1 (22 Aug 2023 15:46)  HR: 64 (22 Aug 2023 23:36) (64 - 77)  BP: 193/93 (22 Aug 2023 23:36) (169/81 - 209/98)  BP(mean): 133 (22 Aug 2023 23:36) (133 - 133)  RR: 18 (22 Aug 2023 23:36) (16 - 18)  SpO2: 100% (22 Aug 2023 23:36) (96% - 100%)    Parameters below as of 22 Aug 2023 20:37  Patient On (Oxygen Delivery Method): room air      I&O's Summary    22 Aug 2023 07:01  -  23 Aug 2023 00:43  --------------------------------------------------------  IN: 0 mL / OUT: 400 mL / NET: -400 mL    Please see the above notes for Labs and radiology.     Assessment and Plan:     72 yo M with hx of HTN, HLD, DM II, Seizure disorder, Bipolar Disorder, BPH, GERD, hx of Diabetic foot w/ OM s/p multiple toe amputations, and recently discharged from hospital after being treated for DKA presents to ED (6days post discharge) with right upper extremity pain and swelling.    RUE pain and swelling likely 2/2 acute DVT   - CTA chest: neg for acute PE.   - s/p Lovenox 90mg x 1 in ED.   - c/w Lovenox BID for now.   - s/p eval by Vascular in ED.   - s/p eval by Burn in ED --> local wound care.   - pain control prn     HTN urgency 2/2 pain?  - s/p hydralazine and nifedipine 1 in ED.   - c/w nifedipine XL 60mg qd for now.     Dysuria likely post- catheter   - UA unremarkable.   - No fever/WBC.   - monitor off abx for now.     B/L nodular opacity on CT   - CT chest shows: Bilateral lower lobe reticular opacities with an additional nodular right lower lobe 0.6 cm opacity may represent infectious/inflammatory etiologies in the appropriate clinical setting. Short interval follow-up is recommended for surveillance.  - patient was recently treated for suspected aspiration.   - No fever/WBC. No evidence of sepsis.   - Monitor off abx for now.   - recommends outpatient CT follow up in 4-6 weeks.      Hyperglycemia likely 2/2 Type DM II   - monitor FS AC HS. Insulin regimen.     Hyperkalemia  - sample hemolyzed.   - repeat BMP    Seizure disorder - c/w keppra     DVT ppx: Lovenox SC  GI ppx:  PPI   Diet: DASH/CC diet   Activity: as tolerated.     Date seen by the attendin2023  Total time spent: 75 minutes. Patient seen and examined at bedside independently of the residents. I read the resident's note and agree with the plan with the additions and corrections as noted below. My note supersedes the resident's note.     REVIEW OF SYSTEMS:  Negative except in HPI.    PMH: HTN, HLD, DM II, Seizure disorder, Bipolar Disorder, BPH, GERD, hx of Diabetic foot w/ OM s/p multiple toe amputations, and recently discharged from hospital after being treated for DKA    FHx: Reviewed. No fhx of asthma/copd, No fhx of liver and pulmonary disease. No fhx of hematological disorder.     Physical Exam:  GEN: No acute distress. Awake, Alert and oriented x 3.   Head: Atraumatic, Normocephalic.   Eye: PEERLA. No sclera icterus. EOMI.   ENT: Normal oropharynx, no thyromegaly, no mass, no lymphadenopathy.   LUNGS: Clear to auscultation bilaterally. No wheeze/rales/crackles.   HEART: Normal. S1/S2 present. RRR. No murmur/gallops.   ABD: Soft, non-tender, non-distended. Bowel sounds present.   EXT: No pitting edema. No erythema. RUE swelling and tenderness.   Integumentary: No rash, No sore, No petechia.   NEURO: CN III-XII intact. Strength: 5/5 b/l ULE. Sensory intact b/l ULE.     Vital Signs Last 24 Hrs  T(C): 35 (22 Aug 2023 23:36), Max: 36.7 (22 Aug 2023 15:46)  T(F): 95 (22 Aug 2023 23:36), Max: 98.1 (22 Aug 2023 15:46)  HR: 64 (22 Aug 2023 23:36) (64 - 77)  BP: 193/93 (22 Aug 2023 23:36) (169/81 - 209/98)  BP(mean): 133 (22 Aug 2023 23:36) (133 - 133)  RR: 18 (22 Aug 2023 23:36) (16 - 18)  SpO2: 100% (22 Aug 2023 23:36) (96% - 100%)    Parameters below as of 22 Aug 2023 20:37  Patient On (Oxygen Delivery Method): room air      I&O's Summary    22 Aug 2023 07:01  -  23 Aug 2023 00:43  --------------------------------------------------------  IN: 0 mL / OUT: 400 mL / NET: -400 mL    Please see the above notes for Labs and radiology.     Assessment and Plan:     74 yo M with hx of HTN, HLD, DM II, Seizure disorder, Bipolar Disorder, BPH, GERD, hx of Diabetic foot w/ OM s/p multiple toe amputations, and recently discharged from hospital after being treated for DKA presents to ED (6days post discharge) with right upper extremity pain and swelling.    RUE pain and swelling likely 2/2 acute DVT   - CTA chest: neg for acute PE.   - s/p Lovenox 90mg x 1 in ED.   - c/w Lovenox BID for now.   - s/p eval by Vascular in ED.   - s/p eval by Burn in ED --> local wound care.   - pain control prn     HTN urgency 2/2 pain  - s/p hydralazine and nifedipine 1 in ED.   - c/w nifedipine XL 60mg qd for now.     Dysuria likely post- catheter   - UA unremarkable.   - No fever/WBC.   - monitor off abx for now.     B/L nodular opacity on CT   - CT chest shows: Bilateral lower lobe reticular opacities with an additional nodular right lower lobe 0.6 cm opacity may represent infectious/inflammatory etiologies in the appropriate clinical setting. Short interval follow-up is recommended for surveillance.  - patient was recently treated for suspected aspiration PNA.   - No fever/WBC. No evidence of sepsis.   - Monitor off abx for now.   - recommends outpatient CT/CXR follow up in 4-6 weeks.      Hyperglycemia likely 2/2 Type DM II   - monitor FS AC HS. Insulin regimen.     Hyperkalemia  - sample hemolyzed.   - repeat BMP    Seizure disorder - c/w keppra     DVT ppx: Lovenox SC  GI ppx:  PPI   Diet: DASH/CC diet   Activity: as tolerated.     Date seen by the attendin2023  Total time spent: 75 minutes.

## 2023-08-23 NOTE — PHYSICAL THERAPY INITIAL EVALUATION ADULT - ADDITIONAL COMMENTS
Pt lives with ex-wife with +ramp entrance to the house. Pt stated he has stairs in the house but he doesn't need to go up.

## 2023-08-23 NOTE — PHYSICAL THERAPY INITIAL EVALUATION ADULT - LEVEL OF INDEPENDENCE: SIT/STAND, REHAB EVAL
Spoke to patient's mother to schedule MRI follow up appointment.     Lea Keita  Clinical Assistant to Dr. Silvano Melara     stand-by assist

## 2023-08-23 NOTE — H&P ADULT - ASSESSMENT
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      #Rt subclavian DVT  #RT Upper extremity swelling  - lovenox 1mg/kg bid  - Vascular appreciated  - Burn follow ing and recs appreciated  - f/u Duplex official read  - wound care    #Dysuria  - UA WBC 23 but otherwise normal  - Unlikely UTI  - bladder scan once to r/o retention    #DM  #DM vasculopathy  - c/w basal and bolus insulin  - tight FS control  - ISS    #HTN  - BP elevated on admission  - c/w nifedipine  - hydralazine 25mg po once  - Hold losartan until BMP repeated to r/o hypekalemia (hemolysed blood sample)     #hx of seizure  - c/w AED    #hyperkalemia 2/2 hemolysed blood sample  - repeat BMP    DIET Diabetic  PPI  AAT

## 2023-08-23 NOTE — PHYSICAL THERAPY INITIAL EVALUATION ADULT - PERTINENT HX OF CURRENT PROBLEM, REHAB EVAL
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

## 2023-08-23 NOTE — DISCHARGE NOTE PROVIDER - ATTENDING DISCHARGE PHYSICAL EXAMINATION:
CONSTITUTIONAL: No apparent distress  EYES: PERRLA and symmetric, EOMI, No conjunctival or scleral injection, non-icteric  ENMT: Oral mucosa with moist membranes. poor dentition; no pharyngeal injection or exudates  NECK: Supple, symmetric and without tracheal deviation   RESP: No respiratory distress, no use of accessory muscles; CTA b/l, no WRR  CV: RRR, +S1S2, no MRG; no JVD; no peripheral edema  GI: Soft, NT, ND, no rebound, no guarding; no CVA tenderness  MSK:decrease ROM of Right elbow due to pain, RUE swelling. S/p Toe amputations of rt toes  SKIN: bullae in right upper arm

## 2023-08-23 NOTE — PHYSICAL THERAPY INITIAL EVALUATION ADULT - GENERAL OBSERVATIONS, REHAB EVAL
1:15-1:45pm Pt encountered supine in bed, A & O x 4 in NAD, no c/o pain and agreeable to PT. Pt requires supervision in bed mobility, close supervision in transfer mobility and ambulation 150 ft using RW with dec yasmin. Pt will benefit from skilled PT 3-5x/wk for thera ex, functional mobility training, balance and gait training to improve mobility.

## 2023-08-23 NOTE — H&P ADULT - HISTORY OF PRESENT ILLNESS
3M PMHx DM, HTN, HLD, seizure disorders, Bipolar Disorder, BPH, GERD, hx of Diabetic foot w/ OM s/p multiple toe amputations, and recently discharged from hospital after being treated for DKA presenting today (6days post discharge) with right upper extremity pain and swelling.  Swelling involves hand forearm and arm associated with few bullae. Je reports pain when flexing the elbow. Denies fever, chills, shoulder pain, trauma, nausea, vomiting, dyspnea, chest pain.   Pain is 8/10 in severity.  At time of evaluation a fresh dressing was placed by the burn team,    Review of system positive for mild dysuria (attributed to post lubin catheter removal during prior admission) and left sided flank pain. He also reports voiding 8 times since arrival to ED/frequency?. denies straining, post-void dribbling, hematuria, or supra-pubic pain/fullness.    Work up in ED showed RUE DVT in subclavian vein on duplex (unofficial read)  patient admitted for further treatment.   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  Swelling involves hand forearm and arm associated with few bullae. Je reports pain when flexing the elbow. Denies fever, chills, shoulder pain, trauma, nausea, vomiting, dyspnea, chest pain.   Pain is 8/10 in severity.  At time of evaluation a fresh dressing was placed by the burn team,    Review of system positive for mild dysuria (attributed to post lubin catheter removal during prior admission) and left sided flank pain. He also reports voiding 8 times since arrival to ED/frequency?. denies straining, post-void dribbling, hematuria, or supra-pubic pain/fullness.    Work up in ED showed RUE DVT in subclavian vein on duplex (unofficial read)  patient admitted for further treatment.

## 2023-08-23 NOTE — DISCHARGE NOTE PROVIDER - NSDCFUSCHEDAPPT_GEN_ALL_CORE_FT
Royal Bhatia  [facility] PreAdmits  Scheduled Appointment: 08/31/2023    [facility]  PODIATRY  [address]  Scheduled Appointment: 08/31/2023    [facility]  CARDIOLOGY [address]  Scheduled Appointment: 09/19/2023    [facility]  ELECTROPH [address]  Scheduled Appointment: 11/20/2023

## 2023-08-23 NOTE — H&P ADULT - NSHPPHYSICALEXAM_GEN_ALL_CORE
T(C): 35 (08-22-23 @ 23:36), Max: 36.7 (08-22-23 @ 15:46)  HR: 64 (08-22-23 @ 23:36) (64 - 77)  BP: 193/93 (08-22-23 @ 23:36) (169/81 - 209/98)  RR: 18 (08-22-23 @ 23:36) (16 - 18)  SpO2: 100% (08-22-23 @ 23:36) (96% - 100%)    CONSTITUTIONAL: No apparent distress  EYES: PERRLA and symmetric, EOMI, No conjunctival or scleral injection, non-icteric  ENMT: Oral mucosa with moist membranes. poor dentition; no pharyngeal injection or exudates  NECK: Supple, symmetric and without tracheal deviation   RESP: No respiratory distress, no use of accessory muscles; CTA b/l, no WRR  CV: RRR, +S1S2, no MRG; no JVD; no peripheral edema  GI: Soft, NT, ND, no rebound, no guarding; no CVA tenderness  MSK:decrease ROM of Right elbow due to pain, RUE swelling. S/p Toe amputations of rhight toes

## 2023-08-23 NOTE — PROGRESS NOTE ADULT - ASSESSMENT
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 On physical exam RUE swelling noted, pulses present, motor and sensation intact. RUE venous dplx notable for Deep venous thrombosis of the right subclavian, axillary, and brachial veins. Superficial thrombophlebitis of the cephalic vein.    PLAN:  -Therapeutic AC for at least 3 months  - Warm compresses to region of R cephalic, NSAIDS if not contraindicated for thrombopheliebitis  - Follow up w/ Dr. Burns outpatient 2 weeks post discharge  - No further vascular surgery intervention at this time.         SPECTRA: [id number] ASSESSMENT:  73M PMHx DM, HTN, HLD, seizure disorders, Bipolar Disorder, BPH, GERD, hx of Diabetic foot w/ OM s/p multiple toe amputations, and recently discharged from hospital after being treated for DKA presenting today (6days post discharge) with right upper extremity pain and swelling. On physical exam RUE swelling noted, pulses present, motor and sensation intact. RUE venous dplx notable for Deep venous thrombosis of the right subclavian, axillary, and brachial veins. Superficial thrombophlebitis of the cephalic vein.    PLAN:  -Therapeutic AC for at least 3 months  - Warm compresses to region of R cephalic, NSAIDS if not contraindicated for thrombophlebitis  - elevate RUE  - Follow up w/ Dr. Burns outpatient 2 weeks post discharge  - No further vascular surgery intervention at this time.         SPECTRA: [id number]

## 2023-08-23 NOTE — PROGRESS NOTE ADULT - ASSESSMENT
Patient is a 73y old  Male who presents with a chief complaint of right arm swelling    RUE partial thickness wounds   - Wounds are mostly healed,  burn surgical intervention  - Continue local wound care daily- wash with soap and water, apply xeroform, wrap with cling.

## 2023-08-23 NOTE — H&P ADULT - NSHPLABSRESULTS_GEN_ALL_CORE
LABS:  cret                        11.2   7.99  )-----------( 212      ( 22 Aug 2023 16:58 )             35.1     08-22    136  |  102  |  14  ----------------------------<  450<HH>  5.9<H>   |  22  |  1.0    Ca    9.0      22 Aug 2023 16:58    TPro  6.5  /  Alb  3.7  /  TBili  0.3  /  DBili  x   /  AST  37  /  ALT  23  /  AlkPhos  108  08-22    CT-angio chest PE protocol:    No acute pulmonary emboli.  Bilateral lower lobe reticular opacities with an additional nodular right   lower lobe 0.6 cm opacity may represent infectious/inflammatory   etiologies in the appropriate clinical setting. Short interval follow-up   is recommended for surveillance. LABS:               11.2   7.99  )-----------( 212      ( 22 Aug 2023 16:58 )             35.1     08-22    136  |  102  |  14  ----------------------------<  450<HH>  5.9<H>   |  22  |  1.0    Ca    9.0      22 Aug 2023 16:58    TPro  6.5  /  Alb  3.7  /  TBili  0.3  /  DBili  x   /  AST  37  /  ALT  23  /  AlkPhos  108  08-22    CT-angio chest PE protocol:    No acute pulmonary emboli.  Bilateral lower lobe reticular opacities with an additional nodular right   lower lobe 0.6 cm opacity may represent infectious/inflammatory   etiologies in the appropriate clinical setting. Short interval follow-up   is recommended for surveillance.

## 2023-08-23 NOTE — DISCHARGE NOTE PROVIDER - ATTENDING ATTESTATION STATEMENT
Vaccines discussed with provider prior to administration.   I have personally seen and examined the patient. I have collaborated with and supervised the

## 2023-08-23 NOTE — DISCHARGE NOTE NURSING/CASE MANAGEMENT/SOCIAL WORK - PATIENT PORTAL LINK FT
You can access the FollowMyHealth Patient Portal offered by [facility] by registering at the following website: [email or web address]. By joining [facility]’s FollowMyHealth portal, you will also be able to view your health information using other applications (apps) compatible with our system.

## 2023-08-23 NOTE — DISCHARGE NOTE PROVIDER - NSDCCPCAREPLAN_GEN_ALL_CORE_FT
PRINCIPAL DISCHARGE DIAGNOSIS  Diagnosis: Acute deep vein thrombosis (DVT) of right upper extremity  Assessment and Plan of Treatment: You came to the hospital with right arm pain and swelling. An ultrasound of your right arm was done and the results showed a clot in your right arm.  You are being given Eliquis, which is a medication that will help with the clot in your arm as you do no require surgery to remove the clot at this time. There were also some blister wounds on your arm which were dressed.      SECONDARY DISCHARGE DIAGNOSES  Diagnosis: Hyperglycemia  Assessment and Plan of Treatment: You came in with elevated blood sugar levels and were monitored your blood sugar levels and gave you, your home dose of insulin to stabilize your blood sugar levels.

## 2023-08-23 NOTE — DISCHARGE NOTE PROVIDER - CARE PROVIDER_API CALL
Shane Garnett  Internal Medicine  [address] [place] [address]  Phone: [phone]  Fax: [phone]  Follow Up Time: 2 weeks    Jose Burns  Vascular Surgery  [address] [place] [address], [address] [address]  Phone: [phone]  Fax: [phone]  Follow Up Time: 2 weeks

## 2023-08-23 NOTE — DISCHARGE NOTE PROVIDER - NSDCMRMEDTOKEN_GEN_ALL_CORE_FT
albuterol 90 mcg/inh inhalation aerosol: 2 puff(s) inhaled every 6 hours  alcohol swabs: Apply topically to affected area 4 times a day  aspirin 81 mg oral tablet, chewable: 1 tab(s) orally once a day; take with food  Eliquis Starter Pack for Treatment of DVT and PE 5 mg oral tablet: 2 tab(s) orally 2 times a day Please take 2 tablets twice a day until 8/28/23. Then, starting from 8/29/23, take 1 tablet twice a day.  Freestyle Precision Carlos Strips: Test blood sugar four times a day when you see check blood glucose symbol or when symptoms don&#x27;t match Sherlyn reading.  glucometer (per patient&#x27;s insurance): Test blood sugars four times a day. Dispense #1 glucometer.  Incruse Ellipta 62.5 mcg/inh inhalation powder: 1 inhaler(s) inhaled once a day   insulin glargine 100 units/mL subcutaneous solution: 25 unit(s) subcutaneous once a day (in the morning)  insulin lispro 100 units/mL injectable solution: 7 unit(s) injectable 3 times a day (before meals)  lacosamide 100 mg oral tablet: 1 tab(s) orally every 12 hours MDD:200 mg  lancets: 1 application subcutaneously 4 times a day  levETIRAcetam 1000 mg oral tablet: 1 tab(s) orally 2 times a day  losartan 50 mg oral tablet: 1 tab(s) orally once a day  NIFEdipine 60 mg oral tablet, extended release: 1 tab(s) orally once a day  omeprazole 20 mg oral delayed release capsule: 1 cap(s) orally once a day in the morning, take 30 minutes before breakfast  senna leaf extract oral tablet: 2 tab(s) orally once a day (at bedtime)  silver sulfADIAZINE 1% topical cream: Apply topically to affected area 2 times a day  U-100 Insulin Syringe, 1 mL: 1 application subcutaneously 2 times a day ** 1 mL holds up to 100 units of insulin **   albuterol 90 mcg/inh inhalation aerosol: 2 puff(s) inhaled every 6 hours  aspirin 81 mg oral tablet, chewable: 1 tab(s) orally once a day; take with food  Eliquis Starter Pack for Treatment of DVT and PE 5 mg oral tablet: 2 tab(s) orally 2 times a day Please take 2 tablets twice a day until 8/28/23. Then, starting from 8/29/23, take 1 tablet twice a day.  Incruse Ellipta 62.5 mcg/inh inhalation powder: 1 inhaler(s) inhaled once a day   insulin glargine 100 units/mL subcutaneous solution: 25 unit(s) subcutaneous once a day (in the morning)  insulin lispro 100 units/mL injectable solution: 7 unit(s) injectable 3 times a day (before meals)  lacosamide 100 mg oral tablet: 1 tab(s) orally every 12 hours MDD:200 mg  levETIRAcetam 1000 mg oral tablet: 1 tab(s) orally 2 times a day  losartan 50 mg oral tablet: 1 tab(s) orally once a day  NIFEdipine 60 mg oral tablet, extended release: 1 tab(s) orally once a day  omeprazole 20 mg oral delayed release capsule: 1 cap(s) orally once a day in the morning, take 30 minutes before breakfast  senna leaf extract oral tablet: 2 tab(s) orally once a day (at bedtime)  silver sulfADIAZINE 1% topical cream: Apply topically to affected area 2 times a day

## 2023-08-23 NOTE — DISCHARGE NOTE PROVIDER - HOSPITAL COURSE
73M PMHx DM, HTN, HLD, seizure disorders, Bipolar Disorder, BPH, GERD, hx of Diabetic foot w/ OM s/pmultiple toe amputations, and recently discharged from hospital after being treated for DKA presenting today (6days post discharge) with right upper extremity pain and swelling. Swelling involves hand forearm and arm associated with few bullae. He reports pain when flexing the elbow. Denies fever, chills, shoulder pain, trauma, nausea, vomiting, dyspnea, chest pain. Pain is 8/10 in severity.At time of evaluation a fresh dressing was placed by the burn team. Review of system positive for mild dysuria (attributed to post lubin catheter removal during prior admission) and left sided flank pain. He also reports voiding 8 times since arrival to ED/frequency?. denies straining, post-void dribbling, hematuria, or supra-pubic pain/fullness.  Work up in ED showed RUE DVT in the right subclavian, axillary, and brachial veins. Superficial thrombophlebitis of the cephalic vein.      #Acute RUE DVT  - RUE pain and swelling  - CTA chest neg for acute PE  - s/p Lovenox 90mg x 1 in ED.   - given Lovenox BID  - Vascular consulted-recommended no vascular surgery intervention at this time, continue therapeutic AC with eliquis for at least 3 months  - apply warm compresses to region of R cephalic vein and keep RUE elevated  - Follow up w/ Dr. Burns outpatient 2 weeks post discharge  - Burn eval appreciated-recommend local wound care  - pain control prn       #HTN urgency  - s/p hydralazine and nifedipine 1 in ED  - continuted home dose nifedipine XL 60mg qd during stay  - BP today 134/64      #Dysuria likely post- catheter   - UA on admission unremarkable.   - No fever/WBC  - no abx given      #B/L nodular opacity on CT   - CT chest showed b/l lower lobe reticular opacities with an additional nodular right lower lobe 0.6 cm opacity may represent infectious/inflammatory etiologies  - patient was recently treated for suspected aspiration PNA.   - No fever/WBC. No evidence of sepsis.   - pt was monitored off abx  - recommend outpatient CT/CXR follow up in 4-6 weeks.      #Hyperglycemia likely 2/2 Type DM II   - monitored FS AC HS  - continuted home insulin regimen      #Hyperkalemia-resolved  - sample hemolyzed  - repeat BMP showed normal K      #Seizure disorder  - continued keppra and lacosamide 73M PMHx DM, HTN, HLD, seizure disorders, Bipolar Disorder, BPH, GERD, hx of Diabetic foot w/ OM s/pmultiple toe amputations, and recently discharged from hospital after being treated for DKA presenting today (6days post discharge) with right upper extremity pain and swelling. Swelling involves hand forearm and arm associated with few bullae. He reports pain when flexing the elbow. Denies fever, chills, shoulder pain, trauma, nausea, vomiting, dyspnea, chest pain. Pain is 8/10 in severity. At time of evaluation a fresh dressing was placed by the burn team. Review of system positive for mild dysuria (attributed to post lubin catheter removal during prior admission) and left sided flank pain. He also reports voiding 8 times since arrival to ED/frequency?. denies straining, post-void dribbling, hematuria, or supra-pubic pain/fullness.  Work up in ED showed RUE DVT in the right subclavian, axillary, and brachial veins. Superficial thrombophlebitis of the cephalic vein.      #Acute RUE DVT  - RUE pain and swelling  - CTA chest neg for acute PE  - s/p Lovenox 90mg x 1 in ED.   - given Lovenox BID  - Vascular consulted-recommended no vascular surgery intervention at this time, continue therapeutic AC with eliquis for at least 3 months  - apply warm compresses to region of R cephalic vein and keep RUE elevated  - Follow up w/ Dr. Burns outpatient 2 weeks post discharge  - Burn eval appreciated-recommend local wound care  - pain control prn       #HTN urgency  - s/p hydralazine and nifedipine 1 in ED  - continued home dose nifedipine XL 60mg qd during stay  - BP today 134/64      #Dysuria likely post- catheter   - UA on admission unremarkable.   - No fever/WBC  - no abx given      #B/L nodular opacity on CT   - CT chest showed b/l lower lobe reticular opacities with an additional nodular right lower lobe 0.6 cm opacity may represent infectious/inflammatory etiologies  - patient was recently treated for suspected aspiration PNA.   - No fever/WBC. No evidence of sepsis.   - pt was monitored off abx  - recommend outpatient CT/CXR follow up in 4-6 weeks.      #Hyperglycemia likely 2/2 Type DM II   - monitored FS AC HS  - continued home insulin regimen      #Hyperkalemia-resolved  - sample hemolyzed  - repeat BMP showed normal K      #Seizure disorder  - continued keppra and lacosamide

## 2023-08-24 VITALS
RESPIRATION RATE: 18 BRPM | HEART RATE: 65 BPM | TEMPERATURE: 98 F | SYSTOLIC BLOOD PRESSURE: 135 MMHG | DIASTOLIC BLOOD PRESSURE: 65 MMHG

## 2023-08-24 LAB
ALBUMIN SERPL ELPH-MCNC: 3.4 G/DL — LOW (ref 3.5–5.2)
ALP SERPL-CCNC: 80 U/L — SIGNIFICANT CHANGE UP (ref 30–115)
ALT FLD-CCNC: 14 U/L — SIGNIFICANT CHANGE UP (ref 0–41)
ANION GAP SERPL CALC-SCNC: 9 MMOL/L — SIGNIFICANT CHANGE UP (ref 7–14)
AST SERPL-CCNC: 13 U/L — SIGNIFICANT CHANGE UP (ref 0–41)
BASOPHILS # BLD AUTO: 0.03 K/UL — SIGNIFICANT CHANGE UP (ref 0–0.2)
BASOPHILS NFR BLD AUTO: 0.5 % — SIGNIFICANT CHANGE UP (ref 0–1)
BILIRUB SERPL-MCNC: 0.3 MG/DL — SIGNIFICANT CHANGE UP (ref 0.2–1.2)
BUN SERPL-MCNC: 12 MG/DL — SIGNIFICANT CHANGE UP (ref 10–20)
CALCIUM SERPL-MCNC: 9.1 MG/DL — SIGNIFICANT CHANGE UP (ref 8.4–10.5)
CHLORIDE SERPL-SCNC: 107 MMOL/L — SIGNIFICANT CHANGE UP (ref 98–110)
CO2 SERPL-SCNC: 26 MMOL/L — SIGNIFICANT CHANGE UP (ref 17–32)
CREAT SERPL-MCNC: 0.8 MG/DL — SIGNIFICANT CHANGE UP (ref 0.7–1.5)
EGFR: 92 ML/MIN/1.73M2 — SIGNIFICANT CHANGE UP
EOSINOPHIL # BLD AUTO: 0.03 K/UL — SIGNIFICANT CHANGE UP (ref 0–0.7)
EOSINOPHIL NFR BLD AUTO: 0.5 % — SIGNIFICANT CHANGE UP (ref 0–8)
GLUCOSE BLDC GLUCOMTR-MCNC: 139 MG/DL — HIGH (ref 70–99)
GLUCOSE BLDC GLUCOMTR-MCNC: 97 MG/DL — SIGNIFICANT CHANGE UP (ref 70–99)
GLUCOSE SERPL-MCNC: 160 MG/DL — HIGH (ref 70–99)
HCT VFR BLD CALC: 32.8 % — LOW (ref 42–52)
HGB BLD-MCNC: 10.7 G/DL — LOW (ref 14–18)
IMM GRANULOCYTES NFR BLD AUTO: 1.1 % — HIGH (ref 0.1–0.3)
LYMPHOCYTES # BLD AUTO: 1.13 K/UL — LOW (ref 1.2–3.4)
LYMPHOCYTES # BLD AUTO: 20.3 % — LOW (ref 20.5–51.1)
MAGNESIUM SERPL-MCNC: 1.8 MG/DL — SIGNIFICANT CHANGE UP (ref 1.8–2.4)
MCHC RBC-ENTMCNC: 29.5 PG — SIGNIFICANT CHANGE UP (ref 27–31)
MCHC RBC-ENTMCNC: 32.6 G/DL — SIGNIFICANT CHANGE UP (ref 32–37)
MCV RBC AUTO: 90.4 FL — SIGNIFICANT CHANGE UP (ref 80–94)
MONOCYTES # BLD AUTO: 0.48 K/UL — SIGNIFICANT CHANGE UP (ref 0.1–0.6)
MONOCYTES NFR BLD AUTO: 8.6 % — SIGNIFICANT CHANGE UP (ref 1.7–9.3)
NEUTROPHILS # BLD AUTO: 3.85 K/UL — SIGNIFICANT CHANGE UP (ref 1.4–6.5)
NEUTROPHILS NFR BLD AUTO: 69 % — SIGNIFICANT CHANGE UP (ref 42.2–75.2)
NRBC # BLD: 0 /100 WBCS — SIGNIFICANT CHANGE UP (ref 0–0)
PLATELET # BLD AUTO: 279 K/UL — SIGNIFICANT CHANGE UP (ref 130–400)
PMV BLD: 10.6 FL — HIGH (ref 7.4–10.4)
POTASSIUM SERPL-MCNC: 3.9 MMOL/L — SIGNIFICANT CHANGE UP (ref 3.5–5)
POTASSIUM SERPL-SCNC: 3.9 MMOL/L — SIGNIFICANT CHANGE UP (ref 3.5–5)
PROT SERPL-MCNC: 5.4 G/DL — LOW (ref 6–8)
RBC # BLD: 3.63 M/UL — LOW (ref 4.7–6.1)
RBC # FLD: 14 % — SIGNIFICANT CHANGE UP (ref 11.5–14.5)
SODIUM SERPL-SCNC: 142 MMOL/L — SIGNIFICANT CHANGE UP (ref 135–146)
WBC # BLD: 5.58 K/UL — SIGNIFICANT CHANGE UP (ref 4.8–10.8)
WBC # FLD AUTO: 5.58 K/UL — SIGNIFICANT CHANGE UP (ref 4.8–10.8)

## 2023-08-24 PROCEDURE — 99239 HOSP IP/OBS DSCHRG MGMT >30: CPT

## 2023-08-24 RX ADMIN — LOSARTAN POTASSIUM 50 MILLIGRAM(S): 100 TABLET, FILM COATED ORAL at 05:22

## 2023-08-24 RX ADMIN — Medication 60 MILLIGRAM(S): at 05:22

## 2023-08-24 RX ADMIN — PANTOPRAZOLE SODIUM 40 MILLIGRAM(S): 20 TABLET, DELAYED RELEASE ORAL at 05:22

## 2023-08-24 RX ADMIN — LACOSAMIDE 100 MILLIGRAM(S): 50 TABLET ORAL at 05:24

## 2023-08-24 RX ADMIN — Medication 7 UNIT(S): at 08:01

## 2023-08-24 RX ADMIN — APIXABAN 10 MILLIGRAM(S): 2.5 TABLET, FILM COATED ORAL at 05:24

## 2023-08-24 RX ADMIN — LEVETIRACETAM 1000 MILLIGRAM(S): 250 TABLET, FILM COATED ORAL at 05:23

## 2023-08-24 RX ADMIN — Medication 7 UNIT(S): at 11:43

## 2023-08-24 RX ADMIN — Medication 81 MILLIGRAM(S): at 11:43

## 2023-08-24 NOTE — PROGRESS NOTE ADULT - SUBJECTIVE AND OBJECTIVE BOX
Patient is a 73y old  Male who presents with a chief complaint of right arm swelling (23 Aug 2023 08:15)    AM rounds with attending    Vital Signs Last 24 Hrs  T(C): 36.6 (23 Aug 2023 06:05), Max: 36.7 (22 Aug 2023 15:46)  T(F): 97.8 (23 Aug 2023 06:05), Max: 98.1 (22 Aug 2023 15:46)  HR: 69 (23 Aug 2023 06:05) (64 - 77)  BP: 158/88 (23 Aug 2023 06:05) (158/88 - 209/98)  BP(mean): 133 (22 Aug 2023 23:36) (133 - 133)  RR: 17 (23 Aug 2023 06:05) (16 - 18)  SpO2: 100% (23 Aug 2023 06:05) (96% - 100%)    O2 Parameters below as of 22 Aug 2023 20:37  Patient On (Oxygen Delivery Method): room air    I&O's Summary  22 Aug 2023 07:01  -  23 Aug 2023 07:00  --------------------------------------------------------  IN: 0 mL / OUT: 750 mL / NET: -750 mL    23 Aug 2023 07:01  -  23 Aug 2023 11:47  --------------------------------------------------------  IN: 0 mL / OUT: 500 mL / NET: -500 mL    LABS:                   11.1   6.14  )-----------( 270      ( 23 Aug 2023 06:28 )             34.1     08-23    142  |  106  |  11  ----------------------------<  136<H>  3.8   |  24  |  0.7    Ca    9.2      23 Aug 2023 06:28  Mg     1.7     08-23    TPro  6.5  /  Alb  3.7  /  TBili  0.3  /  DBili  x   /  AST  37  /  ALT  23  /  AlkPhos  108  08-22    CAPILLARY BLOOD GLUCOSE  POCT Blood Glucose.: 63 mg/dL (23 Aug 2023 11:20)  POCT Blood Glucose.: 120 mg/dL (23 Aug 2023 07:48)  POCT Blood Glucose.: 269 mg/dL (22 Aug 2023 23:48)  POCT Blood Glucose.: 323 mg/dL (22 Aug 2023 21:15)    MEDICATIONS  (STANDING):  apixaban 10 milliGRAM(s) Oral two times a day  aspirin  chewable 81 milliGRAM(s) Oral daily  dextrose 5%. 1000 milliLiter(s) (50 mL/Hr) IV Continuous <Continuous>  dextrose 5%. 1000 milliLiter(s) (100 mL/Hr) IV Continuous <Continuous>  dextrose 50% Injectable 25 Gram(s) IV Push once  dextrose 50% Injectable 12.5 Gram(s) IV Push once  dextrose 50% Injectable 25 Gram(s) IV Push once  glucagon  Injectable 1 milliGRAM(s) IntraMuscular once  insulin glargine Injectable (LANTUS) 25 Unit(s) SubCutaneous at bedtime  insulin lispro (ADMELOG) corrective regimen sliding scale   SubCutaneous three times a day before meals  insulin lispro Injectable (ADMELOG) 7 Unit(s) SubCutaneous three times a day before meals  lacosamide 100 milliGRAM(s) Oral two times a day  levETIRAcetam 1000 milliGRAM(s) Oral two times a day  losartan 50 milliGRAM(s) Oral daily  NIFEdipine XL 60 milliGRAM(s) Oral daily  pantoprazole    Tablet 40 milliGRAM(s) Oral before breakfast  senna 2 Tablet(s) Oral at bedtime  silver sulfADIAZINE 1% Cream 1 Application(s) Topical two times a day    MEDICATIONS  (PRN):  albuterol    90 MICROgram(s) HFA Inhaler 2 Puff(s) Inhalation every 6 hours PRN Shortness of Breath and/or Wheezing  dextrose Oral Gel 15 Gram(s) Oral once PRN Blood Glucose LESS THAN 70 milliGRAM(s)/deciliter    PHYSICAL EXAM:  GENERAL: well built, well nourished  HEAD:  Atraumatic, Normocephalic  NERVOUS SYSTEM:  Alert & Oriented X3  CHEST/LUNG: Breathing comfortably on RA, b/l chest rise appreciated   HEART: In no cardiopulmonary distress  Wound:   RUE: Scattered partial thickness wounds to RUE - healing. Some areas pink and dry. No active bleeding, erythema, active bleeding or drainage.
VASCULAR SURGERY PROGRESS NOTE    CC: R arm swelling  Hospital Day #1  Post-Op Day #    Events of past 24 hours:  No acute events overnight. Patient's R distal UE remains swollen, wrapped w/ gauze over previous IV site.     ROS otherwise negative except per subjective and HPI      PAST MEDICAL & SURGICAL HISTORY:  DM (diabetes mellitus)  Type 2, 18 hgb aic  11.2  HTN (hypertension)  Dyslipidemia  Diabetic foot ulcer  Depression  GERD (gastroesophageal reflux disease)  Neuropathy, diabetic  Toe amputation status, right  ()  History of amputation of toe  LT -partial 1st toe      Vital Signs Last 24 Hrs  T(C): 36.6 (23 Aug 2023 06:05), Max: 36.7 (22 Aug 2023 15:46)  T(F): 97.8 (23 Aug 2023 06:05), Max: 98.1 (22 Aug 2023 15:46)  HR: 69 (23 Aug 2023 06:05) (64 - 77)  BP: 158/88 (23 Aug 2023 06:05) (158/88 - 209/98)  BP(mean): 133 (22 Aug 2023 23:36) (133 - 133)  RR: 17 (23 Aug 2023 06:05) (16 - 18)  SpO2: 100% (23 Aug 2023 06:05) (96% - 100%)    Parameters below as of 22 Aug 2023 20:37  Patient On (Oxygen Delivery Method): room air        Pain (0-10):            Pain Control Adequate: [] YES [] N    Diet:    I&O's Detail    22 Aug 2023 07:01  -  23 Aug 2023 07:00  --------------------------------------------------------  IN:  Total IN: 0 mL    OUT:    Voided (mL): 750 mL  Total OUT: 750 mL    Total NET: -750 mL          Bowel Movement: : [] YES [] NO  Flatus: : [] YES [] NO    PHYSICAL EXAM    Appearance: Normal	  HEENT:   Normal oral mucosa, PERRL, EOMI	  Neck: Supple,   Cardiovascular: Regular Rate  Respiratory: Equal chest rise  Gastrointestinal:  Soft, Non-tender, + BS	  Skin: No rashes, No ecchymoses, No cyanosis  Extremities: Normal range of motion, No clubbing, cyanosis RUE edema. Peripheral pulses palpable b/l UE  Neurologic: Non-focal  Psychiatry: A & O x 3, Mood & affect appropriate        MEDICATIONS:   MEDICATIONS  (STANDING):  aspirin  chewable 81 milliGRAM(s) Oral daily  dextrose 5%. 1000 milliLiter(s) (50 mL/Hr) IV Continuous <Continuous>  dextrose 5%. 1000 milliLiter(s) (100 mL/Hr) IV Continuous <Continuous>  dextrose 50% Injectable 25 Gram(s) IV Push once  dextrose 50% Injectable 12.5 Gram(s) IV Push once  dextrose 50% Injectable 25 Gram(s) IV Push once  enoxaparin Injectable 90 milliGRAM(s) SubCutaneous every 12 hours  glucagon  Injectable 1 milliGRAM(s) IntraMuscular once  insulin glargine Injectable (LANTUS) 25 Unit(s) SubCutaneous at bedtime  insulin lispro (ADMELOG) corrective regimen sliding scale   SubCutaneous three times a day before meals  insulin lispro Injectable (ADMELOG) 7 Unit(s) SubCutaneous three times a day before meals  lacosamide 100 milliGRAM(s) Oral two times a day  levETIRAcetam 1000 milliGRAM(s) Oral two times a day  NIFEdipine XL 60 milliGRAM(s) Oral daily  pantoprazole    Tablet 40 milliGRAM(s) Oral before breakfast  senna 2 Tablet(s) Oral at bedtime  silver sulfADIAZINE 1% Cream 1 Application(s) Topical two times a day    MEDICATIONS  (PRN):  albuterol    90 MICROgram(s) HFA Inhaler 2 Puff(s) Inhalation every 6 hours PRN Shortness of Breath and/or Wheezing  dextrose Oral Gel 15 Gram(s) Oral once PRN Blood Glucose LESS THAN 70 milliGRAM(s)/deciliter      LAB/STUDIES:                        11.1   6.14  )-----------( 270      ( 23 Aug 2023 06:28 )             34.1     08-    136  |  102  |  14  ----------------------------<  450<HH>  5.9<H>   |  22  |  1.0    Ca    9.0      22 Aug 2023 16:58    TPro  6.5  /  Alb  3.7  /  TBili  0.3  /  DBili  x   /  AST  37  /  ALT  23  /  AlkPhos  108  08-      LIVER FUNCTIONS - ( 22 Aug 2023 16:58 )  Alb: 3.7 g/dL / Pro: 6.5 g/dL / ALK PHOS: 108 U/L / ALT: 23 U/L / AST: 37 U/L / GGT: x             Urinalysis Basic - ( 22 Aug 2023 19:01 )    Color: Yellow / Appearance: Clear / S.020 / pH: x  Gluc: x / Ketone: Negative mg/dL  / Bili: Negative / Urobili: 0.2 mg/dL   Blood: x / Protein: Trace mg/dL / Nitrite: Negative   Leuk Esterase: Trace / RBC: 0 /HPF / WBC 23 /HPF   Sq Epi: x / Non Sq Epi: 0 /HPF / Bacteria: Negative /HPF      CARDIAC MARKERS ( 22 Aug 2023 18:33 )  x     / <0.01 ng/mL / x     / x     / x          IMAGING:  < from: [facility] Duplex Upper Ext Vein Scan, Right (23 @ 17:31) >  Deep venous thrombosis of the right subclavian, axillary, and brachial   veins. Superficial thrombophlebitis of the cephalic vein.    < end of copied text >  < from: CT Angio Chest PE Protocol w/ IV Cont (23 @ 19:46) >    No acute pulmonary emboli.    Bilateral lower lobe reticular opacities with an additional nodular right   lower lobe 0.6 cm opacity may represent infectious/inflammatory   etiologies in the appropriate clinical setting. Short interval follow-up   is recommended for surveillance.    < end of copied text >      
24H events:    Patient is a 73y old Male who presents with a chief complaint of right arm swelling (23 Aug 2023 16:09)    Primary diagnosis of Acute deep vein thrombosis (DVT) of right upper extremity      Today is hospital day 2d. This morning patient was seen and examined at bedside, resting comfortably in bed.  No acute or major events overnight.      PAST MEDICAL & SURGICAL HISTORY  DM (diabetes mellitus)  Type 2, 12/27/18 hgb aic  11.2    HTN (hypertension)    Dyslipidemia    Diabetic foot ulcer    Depression    GERD (gastroesophageal reflux disease)    Neuropathy, diabetic    Toe amputation status, right  (2008)    History of amputation of toe  LT -partial 1st toe      SOCIAL HISTORY:  Social History:      ALLERGIES:  No Known Allergies    MEDICATIONS:  STANDING MEDICATIONS  apixaban 10 milliGRAM(s) Oral two times a day  aspirin  chewable 81 milliGRAM(s) Oral daily  dextrose 5%. 1000 milliLiter(s) IV Continuous <Continuous>  dextrose 5%. 1000 milliLiter(s) IV Continuous <Continuous>  dextrose 50% Injectable 12.5 Gram(s) IV Push once  dextrose 50% Injectable 25 Gram(s) IV Push once  dextrose 50% Injectable 25 Gram(s) IV Push once  glucagon  Injectable 1 milliGRAM(s) IntraMuscular once  insulin glargine Injectable (LANTUS) 25 Unit(s) SubCutaneous at bedtime  insulin lispro (ADMELOG) corrective regimen sliding scale   SubCutaneous three times a day before meals  insulin lispro Injectable (ADMELOG) 7 Unit(s) SubCutaneous three times a day before meals  lacosamide 100 milliGRAM(s) Oral two times a day  levETIRAcetam 1000 milliGRAM(s) Oral two times a day  losartan 50 milliGRAM(s) Oral daily  NIFEdipine XL 60 milliGRAM(s) Oral daily  pantoprazole    Tablet 40 milliGRAM(s) Oral before breakfast  senna 2 Tablet(s) Oral at bedtime  silver sulfADIAZINE 1% Cream 1 Application(s) Topical two times a day    PRN MEDICATIONS  albuterol    90 MICROgram(s) HFA Inhaler 2 Puff(s) Inhalation every 6 hours PRN  dextrose Oral Gel 15 Gram(s) Oral once PRN    VITALS:   T(F): 97.3  HR: 63  BP: 131/67  RR: 18  SpO2: --    PHYSICAL EXAM:  GENERAL:   ( x ) NAD, lying in bed comfortably     (  ) obtunded     (  ) lethargic     (  ) somnolent    HEAD:   ( x ) Atraumatic     (  ) hematoma     (  ) laceration (specify location:       )     NECK:  ( x ) Supple     (  ) neck stiffness     (  ) nuchal rigidity     (  )  no JVD     (  ) JVD present ( -- cm)    HEART:  Rate -->     ( x ) normal rate     (  ) bradycardic     (  ) tachycardic  Rhythm -->     (  x) regular     (  ) regularly irregular     (  ) irregularly irregular  Murmurs -->     ( x ) normal s1s2     (  ) systolic murmur     (  ) diastolic murmur     (  ) continuous murmur      (  ) S3 present     (  ) S4 present    LUNGS:   (x  )Unlabored respirations     (  ) tachypnea  (x  ) B/L air entry     (  ) decreased breath sounds in:  (location     )    (  ) no adventitious sound     (  ) crackles     (  ) wheezing      (  ) rhonchi      (specify location:       )  (  ) chest wall tenderness (specify location:       )    ABDOMEN:   ( x ) Soft     (  ) tense   |   (  ) nondistended     (  ) distended   |   (  ) +BS     (  ) hypoactive bowel sounds     (  ) hyperactive bowel sounds  (  ) nontender     (  ) RUQ tenderness     (  ) RLQ tenderness     (  ) LLQ tenderness     (  ) epigastric tenderness     (  ) diffuse tenderness  (  ) Splenomegaly      (  ) Hepatomegaly      (  ) Jaundice     (  ) ecchymosis     EXTREMITIES:  ( x ) Normal     (  ) Rash     (  ) ecchymosis     (  ) varicose veins      (  ) pitting edema     (  ) non-pitting edema   (  ) ulceration     (  ) gangrene:     (location:     )    NERVOUS SYSTEM:    ( x ) A&Ox3     (  ) confused     (  ) lethargic  CN II-XII:     (  ) Intact     (  ) deficits found     (Specify:     )   Upper extremities:     (  ) no sensorimotor deficits     (  ) weakness     (  ) loss of proprioception/vibration     (  ) loss of touch/temperature (specify:    )  Lower extremities:     (  ) no sensorimotor deficits     (  ) weakness     (  ) loss of proprioception/vibration     (  ) loss of touch/temperature (specify:    )    SKIN:   (  ) No rashes or lesions     (  ) maculopapular rash     (  ) pustules     (  ) vesicles     (  ) ulcer     (  ) ecchymosis     (specify location:     )    AMPAC score:    (  ) Indwelling Basilio Catheter:   Date insterted:    Reason (  ) Critical illness     (  ) urinary retention    (  ) Accurate Ins/Outs Monitoring     (  ) CMO patient    (  ) Central Line:   Date inserted:  Location: (  ) Right IJ     (  ) Left IJ     (  ) Right Fem     (  ) Left Fem    (  ) SPC        (  ) pigtail       (  ) PEG tube       (  ) colostomy       (  ) jejunostomy  (  ) U-Dall    LABS:                        10.7   5.58  )-----------( 279      ( 24 Aug 2023 07:41 )             32.8     08-24    142  |  107  |  12  ----------------------------<  160<H>  3.9   |  26  |  0.8    Ca    9.1      24 Aug 2023 07:41  Mg     1.8     08-24    TPro  5.4<L>  /  Alb  3.4<L>  /  TBili  0.3  /  DBili  x   /  AST  13  /  ALT  14  /  AlkPhos  80  08-24      Urinalysis Basic - ( 24 Aug 2023 07:41 )    Color: x / Appearance: x / SG: x / pH: x  Gluc: 160 mg/dL / Ketone: x  / Bili: x / Urobili: x   Blood: x / Protein: x / Nitrite: x   Leuk Esterase: x / RBC: x / WBC x   Sq Epi: x / Non Sq Epi: x / Bacteria: x            CARDIAC MARKERS ( 22 Aug 2023 18:33 )  x     / <0.01 ng/mL / x     / x     / x

## 2023-08-24 NOTE — PROGRESS NOTE ADULT - ASSESSMENT
74 yo M with hx of HTN, HLD, DM II, Seizure disorder, Bipolar Disorder, BPH, GERD, hx of Diabetic foot w/ OM s/p multiple toe amputations, and recently discharged from hospital after being treated for DKA presents to ED (6 days post discharge) with right upper extremity pain and swelling.      #Acute RUE DVT  - RUE pain and swelling  - CTA chest neg for acute PE  - s/p Lovenox 90mg x 1 in ED.   - pt was given Lovenox BID  - Vascular consulted-recommended no vascular surgery intervention at this time, continue therapeutic AC with eliquis for at least 3 months  - apply warm compresses to region of R cephalic vein and keep RUE elevated  - Follow up w/ Dr. Burns outpatient 2 weeks post discharge  - Burn eval appreciated-recommend local wound care      #HTN urgency  - s/p hydralazine and nifedipine 1 in ED  - continued home dose nifedipine XL 60mg qd during stay  - BP today 134/64      #Dysuria likely post- catheter   - UA on admission unremarkable.   - No fever/WBC  - no abx given      #B/L nodular opacity on CT   - CT chest showed b/l lower lobe reticular opacities with an additional nodular right lower lobe 0.6 cm opacity may represent infectious/inflammatory etiologies  - patient was recently treated for suspected aspiration PNA.   - No fever/WBC. No evidence of sepsis.   - pt was monitored off abx  - recommend outpatient CT/CXR follow up in 4-6 weeks.      #Hyperglycemia likely 2/2 Type DM II   - monitored FS AC HS  - continued home insulin regimen      #Hyperkalemia-resolved  - sample hemolyzed  - repeat BMP showed normal K      #Seizure disorder  - continued keppra and lacosamide      - Pt is discharged today, wife will be picking him up today. 72 yo M with hx of HTN, HLD, DM II, Seizure disorder, Bipolar Disorder, BPH, GERD, hx of Diabetic foot w/ OM s/p multiple toe amputations, and recently discharged from hospital after being treated for DKA presents to ED (6 days post discharge) with right upper extremity pain and swelling.      #Acute RUE DVT  - RUE pain and swelling  - CTA chest neg for acute PE  - s/p Lovenox 90mg x 1 in ED.   - pt was given Lovenox BID  - Vascular consulted-recommended no vascular surgery intervention at this time, continue therapeutic AC with eliquis for at least 3 months  - apply warm compresses to region of R cephalic vein and keep RUE elevated  - Follow up w/ Dr. Burns outpatient 2 weeks post discharge  - Burn eval appreciated-recommend local wound care      #HTN urgency  - s/p hydralazine and nifedipine 1 in ED  - continued home dose nifedipine XL 60mg qd during stay  - BP today 134/64      #Dysuria likely post- catheter   - UA on admission unremarkable.   - No fever/WBC  - no abx given      #B/L nodular opacity on CT   - CT chest showed b/l lower lobe reticular opacities with an additional nodular right lower lobe 0.6 cm opacity may represent infectious/inflammatory etiologies  - patient was recently treated for suspected aspiration PNA.   - No fever/WBC. No evidence of sepsis.   - pt was monitored off abx  - recommend outpatient CT/CXR follow up in 4-6 weeks.      #Hyperglycemia likely 2/2 Type DM II   - monitored FS AC HS  - continued home insulin regimen      #Hyperkalemia-resolved  - sample hemolyzed  - repeat BMP showed normal K      #Seizure disorder  - continued keppra and lacosamide      - Pt was discharged yesterday however due to disposition concern, pt did not leave yesterday-wife will be picking him up today.

## 2023-08-24 NOTE — CHART NOTE - NSCHARTNOTEFT_GEN_A_CORE
RD consulted for: [place]    Per H&P, pt is a 74 y/o male with PMHx of HTN, HLD, DM II, Seizure disorder, Bipolar Disorder, BPH, GERD, hx of Diabetic foot w/ OM s/p multiple toe amputations, and recently discharged from hospital after being treated for DKA presents to ED (6 days post discharge) with right upper extremity pain and swelling.    Dosing weight is 94.8 KG; BMI 29.2 (overweight range). Current diet order is DASH/TLC, consistent carbohydrate (no snacks). Pt reports good appetite; consuming >75% of meals provided in-house. No chewing or swallowing difficulties reported. No nausea or vomiting reported. Pt reports having good appetite prior to admit; consumed 3 meals daily. Denies drinking protein shake supplements. Pt took vitamins and minerals however, does not recall specific names. NKFA; denies any restrictive cultural or Jainism food preferences. No weight loss or gain reported.    No pressure injuries noted to skin per flow sheet. Edema 2+ noted to right hand. Last BM a few day ago per pt.     Pt is at low nutrition risk; will f/u in 7-10 days or prn.    RD to remain available: Esperanza Thompson [phone] or TEAMS
Pt was admitted today on 8/23. Saw pt bedside. Concerned about walking and wants to work with PT. Denies any complaints currently. Was planning on discharging pt as his eliquis was covered by insurance, however wife initially agreed to take him home and then called back and stated she can't take care of him. PT recommended OP PT so will work with CM/SW to figure out a dispo plan. Replenish Mg; confirm home meds and restarted losartan for BP control. BP controlled. Discussed plan with residents

## 2023-08-31 ENCOUNTER — APPOINTMENT (OUTPATIENT)
Dept: PODIATRY | Facility: CLINIC | Age: 73
End: 2023-08-31
Payer: MEDICARE

## 2023-08-31 ENCOUNTER — OUTPATIENT (OUTPATIENT)
Dept: OUTPATIENT SERVICES | Facility: HOSPITAL | Age: 73
LOS: 1 days | End: 2023-08-31
Payer: MEDICARE

## 2023-08-31 DIAGNOSIS — Z89.421 ACQUIRED ABSENCE OF OTHER RIGHT TOE(S): Chronic | ICD-10-CM

## 2023-08-31 DIAGNOSIS — Z89.431 ACQUIRED ABSENCE OF RIGHT FOOT: ICD-10-CM

## 2023-08-31 DIAGNOSIS — Z89.429 ACQUIRED ABSENCE OF OTHER TOE(S), UNSPECIFIED SIDE: Chronic | ICD-10-CM

## 2023-08-31 DIAGNOSIS — Z00.00 ENCOUNTER FOR GENERAL ADULT MEDICAL EXAMINATION WITHOUT ABNORMAL FINDINGS: ICD-10-CM

## 2023-08-31 PROCEDURE — 99212 OFFICE O/P EST SF 10 MIN: CPT

## 2023-09-01 PROBLEM — Z89.431 HISTORY OF TRANSMETATARSAL AMPUTATION OF RIGHT FOOT: Status: ACTIVE | Noted: 2020-09-04

## 2023-09-01 NOTE — END OF VISIT
[] : Resident [FreeTextEntry3] : aseptic debridement of calluses modification of inserts indicated to reduce weight to pressure baring regions Class A -non-traumatic amputation of foot/digit

## 2023-09-01 NOTE — PROCEDURE
[FreeTextEntry1] : callosities trimmed bilateral feet. pt with good skin integrity throughout, given new darco shoe for right foot, added pink plastizote and ppt to increase cushioning. pt to return in 3 to 4 weeks with shoes.

## 2023-09-01 NOTE — HISTORY OF PRESENT ILLNESS
[FreeTextEntry1] : 72 y/o diabetic male s/p right foot ulcer debridement w/ removal of staples 5/18/2023. Here today for follow up wound care  6/21 presents today with increased drainage to the right foot. Patient notes increased drainage since saturday 7/5 patient s/p right foot debridement of tissue and bone 6/23 . doing well. no pain 7/12 returns for care 7/19 here for follow up 8/31 here today for fabrication of inserts
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
## 2023-09-05 DIAGNOSIS — E11.40 TYPE 2 DIABETES MELLITUS WITH DIABETIC NEUROPATHY, UNSPECIFIED: ICD-10-CM

## 2023-09-05 DIAGNOSIS — Z89.431 ACQUIRED ABSENCE OF RIGHT FOOT: ICD-10-CM

## 2023-09-13 ENCOUNTER — APPOINTMENT (OUTPATIENT)
Dept: ENDOCRINOLOGY | Facility: CLINIC | Age: 73
End: 2023-09-13

## 2023-09-13 ENCOUNTER — OUTPATIENT (OUTPATIENT)
Dept: OUTPATIENT SERVICES | Facility: HOSPITAL | Age: 73
LOS: 1 days | End: 2023-09-13
Payer: MEDICARE

## 2023-09-13 VITALS
WEIGHT: 203 LBS | HEART RATE: 65 BPM | BODY MASS INDEX: 28.42 KG/M2 | SYSTOLIC BLOOD PRESSURE: 173 MMHG | HEIGHT: 71 IN | DIASTOLIC BLOOD PRESSURE: 83 MMHG

## 2023-09-13 DIAGNOSIS — E11.65 TYPE 2 DIABETES MELLITUS WITH HYPERGLYCEMIA: ICD-10-CM

## 2023-09-13 DIAGNOSIS — L97.509 TYPE 2 DIABETES MELLITUS WITH FOOT ULCER: ICD-10-CM

## 2023-09-13 DIAGNOSIS — E78.00 PURE HYPERCHOLESTEROLEMIA, UNSPECIFIED: ICD-10-CM

## 2023-09-13 DIAGNOSIS — Z89.421 ACQUIRED ABSENCE OF OTHER RIGHT TOE(S): Chronic | ICD-10-CM

## 2023-09-13 DIAGNOSIS — E11.621 TYPE 2 DIABETES MELLITUS WITH FOOT ULCER: ICD-10-CM

## 2023-09-13 DIAGNOSIS — Z89.429 ACQUIRED ABSENCE OF OTHER TOE(S), UNSPECIFIED SIDE: Chronic | ICD-10-CM

## 2023-09-13 DIAGNOSIS — Z00.00 ENCOUNTER FOR GENERAL ADULT MEDICAL EXAMINATION WITHOUT ABNORMAL FINDINGS: ICD-10-CM

## 2023-09-13 PROCEDURE — 99214 OFFICE O/P EST MOD 30 MIN: CPT

## 2023-09-14 NOTE — CONSULT NOTE ADULT - ATTENDING COMMENTS
It was a pleasure to see you in clinic today.   Here is what we discussed:    We will repeat your chest CT, to monitor the area of pneumonia that was seen back in July.  Continue Trelegy Ellipta, one inhalation daily, rinse/gargle after use.  Continue Albuterol or Duonebs every 4-6 hours as needed for shortness of breath or wheezing.  Call us with any change or worsening of your breathing.  Follow-up in six months.    Arlyn Nguyen, CNP  Pulmonary Medicine  [facility]  [phone]    
I have personally seen and examined this patient on 8/24.  I have fully participated in the care of this patient.  I have reviewed all pertinent clinical information, including history, physical exam, plan and note. Patient is currently intubated and sedated. Has facial grimacing to noxious. On VEEG. Continue Keppra.   I have reviewed all pertinent clinical information and reviewed all relevant imaging and diagnostic studies personally.  Recommendations as above.  Agree with above assessment except as noted.
Cryptogenic stroke  recommend ILR implant

## 2023-09-19 ENCOUNTER — NON-APPOINTMENT (OUTPATIENT)
Age: 73
End: 2023-09-19

## 2023-09-19 ENCOUNTER — APPOINTMENT (OUTPATIENT)
Dept: CARDIOLOGY | Facility: CLINIC | Age: 73
End: 2023-09-19
Payer: MEDICARE

## 2023-09-20 PROCEDURE — G2066: CPT

## 2023-09-20 PROCEDURE — 93298 REM INTERROG DEV EVAL SCRMS: CPT

## 2023-09-21 ENCOUNTER — APPOINTMENT (OUTPATIENT)
Dept: PODIATRY | Facility: CLINIC | Age: 73
End: 2023-09-21
Payer: MEDICARE

## 2023-09-21 ENCOUNTER — OUTPATIENT (OUTPATIENT)
Dept: OUTPATIENT SERVICES | Facility: HOSPITAL | Age: 73
LOS: 1 days | End: 2023-09-21
Payer: MEDICARE

## 2023-09-21 ENCOUNTER — APPOINTMENT (OUTPATIENT)
Dept: NUTRITION | Facility: CLINIC | Age: 73
End: 2023-09-21
Payer: MEDICARE

## 2023-09-21 DIAGNOSIS — Z89.429 ACQUIRED ABSENCE OF OTHER TOE(S), UNSPECIFIED SIDE: Chronic | ICD-10-CM

## 2023-09-21 DIAGNOSIS — E11.42 TYPE 2 DIABETES MELLITUS WITH DIABETIC POLYNEUROPATHY: ICD-10-CM

## 2023-09-21 DIAGNOSIS — Z89.421 ACQUIRED ABSENCE OF OTHER RIGHT TOE(S): Chronic | ICD-10-CM

## 2023-09-21 DIAGNOSIS — Z00.00 ENCOUNTER FOR GENERAL ADULT MEDICAL EXAMINATION WITHOUT ABNORMAL FINDINGS: ICD-10-CM

## 2023-09-21 PROCEDURE — G0108: CPT

## 2023-09-21 PROCEDURE — 99213 OFFICE O/P EST LOW 20 MIN: CPT

## 2023-09-22 DIAGNOSIS — E11.9 TYPE 2 DIABETES MELLITUS WITHOUT COMPLICATIONS: ICD-10-CM

## 2023-09-22 PROBLEM — E11.42 CONTROLLED DIABETES MELLITUS WITH DIABETIC POLYNEUROPATHY: Status: ACTIVE | Noted: 2018-04-26

## 2023-10-03 NOTE — OCCUPATIONAL THERAPY INITIAL EVALUATION ADULT - TRANSFER TRAINING, PT EVAL
absent
Patient will perform bed <> chair transfers with moderate assistance with appropriate assistive device by discharge. ; Pt. will improve toilet transfers to moderate assistance with appropriate DME by discharge.

## 2023-10-19 ENCOUNTER — APPOINTMENT (OUTPATIENT)
Dept: PODIATRY | Facility: CLINIC | Age: 73
End: 2023-10-19

## 2023-10-19 ENCOUNTER — APPOINTMENT (OUTPATIENT)
Dept: NUTRITION | Facility: CLINIC | Age: 73
End: 2023-10-19

## 2023-10-24 ENCOUNTER — APPOINTMENT (OUTPATIENT)
Dept: CARDIOLOGY | Facility: CLINIC | Age: 73
End: 2023-10-24

## 2023-11-06 ENCOUNTER — NON-APPOINTMENT (OUTPATIENT)
Age: 73
End: 2023-11-06

## 2023-11-20 ENCOUNTER — APPOINTMENT (OUTPATIENT)
Dept: ELECTROPHYSIOLOGY | Facility: CLINIC | Age: 73
End: 2023-11-20
Payer: MEDICARE

## 2023-11-20 VITALS
RESPIRATION RATE: 18 BRPM | SYSTOLIC BLOOD PRESSURE: 110 MMHG | DIASTOLIC BLOOD PRESSURE: 68 MMHG | WEIGHT: 180 LBS | HEART RATE: 77 BPM | BODY MASS INDEX: 25.2 KG/M2 | HEIGHT: 71 IN | TEMPERATURE: 97.1 F

## 2023-11-20 DIAGNOSIS — R42 DIZZINESS AND GIDDINESS: ICD-10-CM

## 2023-11-20 DIAGNOSIS — Z45.09 ENCOUNTER FOR ADJUSTMENT AND MANAGEMENT OF OTHER CARDIAC DEVICE: ICD-10-CM

## 2023-11-20 PROCEDURE — 99214 OFFICE O/P EST MOD 30 MIN: CPT

## 2023-11-20 RX ORDER — AMMONIUM LACTATE 12 %
12 CREAM (GRAM) TOPICAL
Qty: 1 | Refills: 5 | Status: DISCONTINUED | OUTPATIENT
Start: 2021-10-05 | End: 2023-11-20

## 2023-11-20 RX ORDER — LISINOPRIL 5 MG/1
5 TABLET ORAL DAILY
Refills: 0 | Status: DISCONTINUED | COMMUNITY
End: 2023-11-20

## 2023-11-20 RX ORDER — INSULIN LISPRO 100 [IU]/ML
100 INJECTION, SOLUTION INTRAVENOUS; SUBCUTANEOUS
Refills: 0 | Status: ACTIVE | COMMUNITY
Start: 2023-11-20

## 2023-11-20 RX ORDER — OLANZAPINE 5 MG/1
5 TABLET ORAL DAILY
Refills: 0 | Status: DISCONTINUED | COMMUNITY
End: 2023-11-20

## 2023-11-20 RX ORDER — ESOMEPRAZOLE MAGNESIUM 20 MG/1
20 CAPSULE, DELAYED RELEASE ORAL
Qty: 60 | Refills: 0 | Status: DISCONTINUED | COMMUNITY
Start: 2021-08-10 | End: 2023-11-20

## 2023-11-20 RX ORDER — EMPAGLIFLOZIN 25 MG/1
25 TABLET, FILM COATED ORAL
Qty: 90 | Refills: 3 | Status: DISCONTINUED | COMMUNITY
Start: 2022-12-21 | End: 2023-11-20

## 2023-11-20 RX ORDER — AMOXICILLIN AND CLAVULANATE POTASSIUM 875; 125 MG/1; MG/1
875-125 TABLET, COATED ORAL
Qty: 14 | Refills: 0 | Status: DISCONTINUED | COMMUNITY
Start: 2023-05-15 | End: 2023-11-20

## 2023-11-20 RX ORDER — DULAGLUTIDE 3 MG/.5ML
3 INJECTION, SOLUTION SUBCUTANEOUS
Qty: 1 | Refills: 5 | Status: DISCONTINUED | COMMUNITY
Start: 2019-01-17 | End: 2023-11-20

## 2023-11-20 RX ORDER — DOXAZOSIN 4 MG/1
4 TABLET ORAL DAILY
Refills: 0 | Status: DISCONTINUED | COMMUNITY
End: 2023-11-20

## 2023-11-20 RX ORDER — MAGNESIUM OXIDE TAB 400 MG (241.3 MG ELEMENTAL MG) 400 (241.3 MG) MG
400 (241.3 MG) TAB ORAL
Refills: 0 | Status: DISCONTINUED | COMMUNITY
End: 2023-11-20

## 2023-11-20 RX ORDER — CLOPIDOGREL 75 MG/1
75 TABLET, FILM COATED ORAL
Refills: 0 | Status: DISCONTINUED | COMMUNITY
End: 2023-11-20

## 2023-11-20 RX ORDER — LACOSAMIDE 100 MG/1
100 TABLET ORAL TWICE DAILY
Refills: 0 | Status: ACTIVE | COMMUNITY
Start: 2023-11-20

## 2023-11-20 RX ORDER — AMOXICILLIN AND CLAVULANATE POTASSIUM 875; 125 MG/1; MG/1
875-125 TABLET, COATED ORAL
Qty: 20 | Refills: 0 | Status: DISCONTINUED | COMMUNITY
Start: 2023-06-21 | End: 2023-11-20

## 2023-12-12 ENCOUNTER — RX RENEWAL (OUTPATIENT)
Age: 73
End: 2023-12-12

## 2023-12-27 ENCOUNTER — APPOINTMENT (OUTPATIENT)
Dept: CARDIOLOGY | Facility: CLINIC | Age: 73
End: 2023-12-27
Payer: MEDICARE

## 2023-12-27 ENCOUNTER — NON-APPOINTMENT (OUTPATIENT)
Age: 73
End: 2023-12-27

## 2023-12-28 PROCEDURE — G2066: CPT

## 2023-12-28 PROCEDURE — 93298 REM INTERROG DEV EVAL SCRMS: CPT

## 2024-01-30 ENCOUNTER — APPOINTMENT (OUTPATIENT)
Dept: CARDIOLOGY | Facility: CLINIC | Age: 74
End: 2024-01-30
Payer: MEDICARE

## 2024-01-30 ENCOUNTER — NON-APPOINTMENT (OUTPATIENT)
Age: 74
End: 2024-01-30

## 2024-01-31 PROCEDURE — 93298 REM INTERROG DEV EVAL SCRMS: CPT

## 2024-02-05 ENCOUNTER — APPOINTMENT (OUTPATIENT)
Dept: PODIATRY | Facility: CLINIC | Age: 74
End: 2024-02-05
Payer: MEDICARE

## 2024-02-05 ENCOUNTER — OUTPATIENT (OUTPATIENT)
Dept: OUTPATIENT SERVICES | Facility: HOSPITAL | Age: 74
LOS: 1 days | End: 2024-02-05
Payer: MEDICARE

## 2024-02-05 DIAGNOSIS — Z89.421 ACQUIRED ABSENCE OF OTHER RIGHT TOE(S): Chronic | ICD-10-CM

## 2024-02-05 DIAGNOSIS — Z89.429 ACQUIRED ABSENCE OF OTHER TOE(S), UNSPECIFIED SIDE: Chronic | ICD-10-CM

## 2024-02-05 DIAGNOSIS — Z00.00 ENCOUNTER FOR GENERAL ADULT MEDICAL EXAMINATION WITHOUT ABNORMAL FINDINGS: ICD-10-CM

## 2024-02-05 DIAGNOSIS — Z89.419 ACQUIRED ABSENCE OF UNSPECIFIED GREAT TOE: ICD-10-CM

## 2024-02-05 DIAGNOSIS — E11.40 TYPE 2 DIABETES MELLITUS WITH DIABETIC NEUROPATHY, UNSPECIFIED: ICD-10-CM

## 2024-02-05 PROCEDURE — G2211 COMPLEX E/M VISIT ADD ON: CPT

## 2024-02-05 PROCEDURE — 99213 OFFICE O/P EST LOW 20 MIN: CPT

## 2024-02-05 NOTE — END OF VISIT
[] : Resident [FreeTextEntry3] : right plantar ulcer has healed--->presence of mild callus  callus debridement performed modification of inserts indicated to reduce weight to pressure baring regions  Class A -non-traumatic amputation of foot/digit  [Time Spent: ___ minutes] : I have spent [unfilled] minutes of time on the encounter.

## 2024-02-08 PROBLEM — Z89.419 HISTORY OF AMPUTATION OF GREAT TOE: Status: ACTIVE | Noted: 2018-09-06

## 2024-02-08 NOTE — PHYSICAL EXAM
[Ankle Swelling Bilaterally] : bilaterally  [1+] : left foot dorsalis pedis 1+ [Vibration Dec.] : diminished vibratory sensation at the level of the toes [Diminished Throughout Right Foot] : diminished sensation with monofilament testing throughout right foot [Diminished Throughout Left Foot] : diminished sensation with monofilament testing throughout left foot [Oriented To Time, Place, And Person] : oriented to person, place, and time [Ankle Swelling (On Exam)] : not present [Varicose Veins Of Lower Extremities] : not present [] : not present [de-identified] : R EDIL. L hallux and 2nd digit amputated  [FreeTextEntry1] : mild hyperkeratosis R plantar forefoot.

## 2024-02-08 NOTE — ASSESSMENT
[FreeTextEntry1] : Modified ADA Diabetic Foot Risk Classification 3  A1c reviewed  -Loss of protective sensation: yes  -Presence of foot deformity:  yes   -presence of pre-ulcerative lesion: no   -hemorrhage into callus:  no -atrophy of heel or metatarsal fat pads: no  -Diabetic neurovascular foot assessment  performed.  -Discussed with patient diabetic foot hygiene.Patient instructed to regularly check the bottom of the feet -nails and callus debrided  -Return 3  month  Class A -non-traumatic amputation of foot/digit

## 2024-02-09 DIAGNOSIS — E11.40 TYPE 2 DIABETES MELLITUS WITH DIABETIC NEUROPATHY, UNSPECIFIED: ICD-10-CM
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
## 2024-02-09 DIAGNOSIS — Z89.419 ACQUIRED ABSENCE OF UNSPECIFIED GREAT TOE: ICD-10-CM
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
## 2024-03-05 ENCOUNTER — NON-APPOINTMENT (OUTPATIENT)
Age: 74
End: 2024-03-05

## 2024-03-05 ENCOUNTER — INPATIENT (INPATIENT)
Facility: HOSPITAL | Age: 74
LOS: 2 days | Discharge: HOME HEALTH | DRG: 65 | End: 2024-03-08
Attending: GENERAL ACUTE CARE HOSPITAL | Admitting: STUDENT IN AN ORGANIZED HEALTH CARE EDUCATION/TRAINING PROGRAM
Payer: MEDICARE

## 2024-03-05 ENCOUNTER — APPOINTMENT (OUTPATIENT)
Dept: CARDIOLOGY | Facility: CLINIC | Age: 74
End: 2024-03-05
Payer: MEDICARE

## 2024-03-05 VITALS
WEIGHT: 207.01 LBS | DIASTOLIC BLOOD PRESSURE: 88 MMHG | OXYGEN SATURATION: 99 % | SYSTOLIC BLOOD PRESSURE: 202 MMHG | TEMPERATURE: 98 F | HEART RATE: 90 BPM | RESPIRATION RATE: 16 BRPM

## 2024-03-05 DIAGNOSIS — I63.9 CEREBRAL INFARCTION, UNSPECIFIED: ICD-10-CM

## 2024-03-05 DIAGNOSIS — Z89.429 ACQUIRED ABSENCE OF OTHER TOE(S), UNSPECIFIED SIDE: Chronic | ICD-10-CM

## 2024-03-05 DIAGNOSIS — Z89.421 ACQUIRED ABSENCE OF OTHER RIGHT TOE(S): Chronic | ICD-10-CM

## 2024-03-05 LAB
ALBUMIN SERPL ELPH-MCNC: 4.5 G/DL — SIGNIFICANT CHANGE UP (ref 3.5–5.2)
ALP SERPL-CCNC: 127 U/L — HIGH (ref 30–115)
ALT FLD-CCNC: 18 U/L — SIGNIFICANT CHANGE UP (ref 0–41)
ANION GAP SERPL CALC-SCNC: 15 MMOL/L — HIGH (ref 7–14)
APTT BLD: 23.2 SEC — CRITICAL LOW (ref 27–39.2)
AST SERPL-CCNC: 21 U/L — SIGNIFICANT CHANGE UP (ref 0–41)
B-OH-BUTYR SERPL-SCNC: <0.2 MMOL/L — SIGNIFICANT CHANGE UP
BASE EXCESS BLDV CALC-SCNC: 3.5 MMOL/L — HIGH (ref -2–3)
BASOPHILS # BLD AUTO: 0.02 K/UL — SIGNIFICANT CHANGE UP (ref 0–0.2)
BASOPHILS NFR BLD AUTO: 0.3 % — SIGNIFICANT CHANGE UP (ref 0–1)
BILIRUB SERPL-MCNC: 0.5 MG/DL — SIGNIFICANT CHANGE UP (ref 0.2–1.2)
BUN SERPL-MCNC: 16 MG/DL — SIGNIFICANT CHANGE UP (ref 10–20)
CA-I SERPL-SCNC: 1.13 MMOL/L — LOW (ref 1.15–1.33)
CALCIUM SERPL-MCNC: 9.6 MG/DL — SIGNIFICANT CHANGE UP (ref 8.4–10.4)
CHLORIDE SERPL-SCNC: 101 MMOL/L — SIGNIFICANT CHANGE UP (ref 98–110)
CO2 SERPL-SCNC: 25 MMOL/L — SIGNIFICANT CHANGE UP (ref 17–32)
CREAT SERPL-MCNC: 1.1 MG/DL — SIGNIFICANT CHANGE UP (ref 0.7–1.5)
EGFR: 70 ML/MIN/1.73M2 — SIGNIFICANT CHANGE UP
EOSINOPHIL # BLD AUTO: 0.02 K/UL — SIGNIFICANT CHANGE UP (ref 0–0.7)
EOSINOPHIL NFR BLD AUTO: 0.3 % — SIGNIFICANT CHANGE UP (ref 0–8)
GAS PNL BLDV: 133 MMOL/L — LOW (ref 136–145)
GAS PNL BLDV: SIGNIFICANT CHANGE UP
GAS PNL BLDV: SIGNIFICANT CHANGE UP
GLUCOSE SERPL-MCNC: 493 MG/DL — CRITICAL HIGH (ref 70–99)
HCO3 BLDV-SCNC: 29 MMOL/L — SIGNIFICANT CHANGE UP (ref 22–29)
HCT VFR BLD CALC: 40.7 % — LOW (ref 42–52)
HGB BLD-MCNC: 13.4 G/DL — LOW (ref 14–18)
IMM GRANULOCYTES NFR BLD AUTO: 0.9 % — HIGH (ref 0.1–0.3)
INR BLD: 0.91 RATIO — SIGNIFICANT CHANGE UP (ref 0.65–1.3)
LACTATE BLDV-MCNC: 1.3 MMOL/L — SIGNIFICANT CHANGE UP (ref 0.5–2)
LYMPHOCYTES # BLD AUTO: 0.69 K/UL — LOW (ref 1.2–3.4)
LYMPHOCYTES # BLD AUTO: 10.7 % — LOW (ref 20.5–51.1)
MCHC RBC-ENTMCNC: 30.1 PG — SIGNIFICANT CHANGE UP (ref 27–31)
MCHC RBC-ENTMCNC: 32.9 G/DL — SIGNIFICANT CHANGE UP (ref 32–37)
MCV RBC AUTO: 91.5 FL — SIGNIFICANT CHANGE UP (ref 80–94)
MONOCYTES # BLD AUTO: 0.45 K/UL — SIGNIFICANT CHANGE UP (ref 0.1–0.6)
MONOCYTES NFR BLD AUTO: 7 % — SIGNIFICANT CHANGE UP (ref 1.7–9.3)
NEUTROPHILS # BLD AUTO: 5.21 K/UL — SIGNIFICANT CHANGE UP (ref 1.4–6.5)
NEUTROPHILS NFR BLD AUTO: 80.8 % — HIGH (ref 42.2–75.2)
NRBC # BLD: 0 /100 WBCS — SIGNIFICANT CHANGE UP (ref 0–0)
PCO2 BLDV: 47 MMHG — SIGNIFICANT CHANGE UP (ref 42–55)
PH BLDV: 7.4 — SIGNIFICANT CHANGE UP (ref 7.32–7.43)
PLATELET # BLD AUTO: 188 K/UL — SIGNIFICANT CHANGE UP (ref 130–400)
PMV BLD: 12.3 FL — HIGH (ref 7.4–10.4)
PO2 BLDV: 45 MMHG — SIGNIFICANT CHANGE UP (ref 25–45)
POTASSIUM BLDV-SCNC: 4.6 MMOL/L — SIGNIFICANT CHANGE UP (ref 3.5–5.1)
POTASSIUM SERPL-MCNC: 5.3 MMOL/L — HIGH (ref 3.5–5)
POTASSIUM SERPL-SCNC: 5.3 MMOL/L — HIGH (ref 3.5–5)
PROT SERPL-MCNC: 7.1 G/DL — SIGNIFICANT CHANGE UP (ref 6–8)
PROTHROM AB SERPL-ACNC: 10.4 SEC — SIGNIFICANT CHANGE UP (ref 9.95–12.87)
RBC # BLD: 4.45 M/UL — LOW (ref 4.7–6.1)
RBC # FLD: 13 % — SIGNIFICANT CHANGE UP (ref 11.5–14.5)
SAO2 % BLDV: 87.5 % — SIGNIFICANT CHANGE UP (ref 67–88)
SODIUM SERPL-SCNC: 141 MMOL/L — SIGNIFICANT CHANGE UP (ref 135–146)
TROPONIN T, HIGH SENSITIVITY RESULT: 14 NG/L — SIGNIFICANT CHANGE UP (ref 6–21)
WBC # BLD: 6.45 K/UL — SIGNIFICANT CHANGE UP (ref 4.8–10.8)
WBC # FLD AUTO: 6.45 K/UL — SIGNIFICANT CHANGE UP (ref 4.8–10.8)

## 2024-03-05 PROCEDURE — 97162 PT EVAL MOD COMPLEX 30 MIN: CPT | Mod: GP

## 2024-03-05 PROCEDURE — 93970 EXTREMITY STUDY: CPT

## 2024-03-05 PROCEDURE — 99291 CRITICAL CARE FIRST HOUR: CPT

## 2024-03-05 PROCEDURE — 93298 REM INTERROG DEV EVAL SCRMS: CPT

## 2024-03-05 PROCEDURE — 93306 TTE W/DOPPLER COMPLETE: CPT

## 2024-03-05 PROCEDURE — 80061 LIPID PANEL: CPT

## 2024-03-05 PROCEDURE — 93290 INTERROG DEV EVAL ICPMS IP: CPT

## 2024-03-05 PROCEDURE — 70496 CT ANGIOGRAPHY HEAD: CPT | Mod: 26,MC

## 2024-03-05 PROCEDURE — 70498 CT ANGIOGRAPHY NECK: CPT | Mod: 26,MC

## 2024-03-05 PROCEDURE — 85027 COMPLETE CBC AUTOMATED: CPT

## 2024-03-05 PROCEDURE — 83036 HEMOGLOBIN GLYCOSYLATED A1C: CPT

## 2024-03-05 PROCEDURE — 82962 GLUCOSE BLOOD TEST: CPT

## 2024-03-05 PROCEDURE — 93971 EXTREMITY STUDY: CPT | Mod: RT

## 2024-03-05 PROCEDURE — 36415 COLL VENOUS BLD VENIPUNCTURE: CPT

## 2024-03-05 PROCEDURE — 97167 OT EVAL HIGH COMPLEX 60 MIN: CPT | Mod: GO

## 2024-03-05 PROCEDURE — 80053 COMPREHEN METABOLIC PANEL: CPT

## 2024-03-05 PROCEDURE — 70551 MRI BRAIN STEM W/O DYE: CPT | Mod: MC

## 2024-03-05 PROCEDURE — 70450 CT HEAD/BRAIN W/O DYE: CPT | Mod: 26,MC,59

## 2024-03-05 PROCEDURE — 97116 GAIT TRAINING THERAPY: CPT | Mod: GP

## 2024-03-05 PROCEDURE — 97530 THERAPEUTIC ACTIVITIES: CPT | Mod: GP

## 2024-03-05 RX ORDER — INSULIN DEGLUDEC 100 U/ML
40 INJECTION, SOLUTION SUBCUTANEOUS
Refills: 0 | DISCHARGE

## 2024-03-05 RX ORDER — LISINOPRIL 2.5 MG/1
40 TABLET ORAL DAILY
Refills: 0 | Status: DISCONTINUED | OUTPATIENT
Start: 2024-03-05 | End: 2024-03-08

## 2024-03-05 RX ORDER — BENAZEPRIL HYDROCHLORIDE 40 MG/1
1 TABLET ORAL
Refills: 0 | DISCHARGE

## 2024-03-05 RX ORDER — INSULIN GLARGINE 100 [IU]/ML
20 INJECTION, SOLUTION SUBCUTANEOUS EVERY MORNING
Refills: 0 | Status: DISCONTINUED | OUTPATIENT
Start: 2024-03-06 | End: 2024-03-08

## 2024-03-05 RX ORDER — LACOSAMIDE 50 MG/1
100 TABLET ORAL
Refills: 0 | Status: DISCONTINUED | OUTPATIENT
Start: 2024-03-05 | End: 2024-03-08

## 2024-03-05 RX ORDER — INSULIN HUMAN 100 [IU]/ML
10 INJECTION, SOLUTION SUBCUTANEOUS ONCE
Refills: 0 | Status: COMPLETED | OUTPATIENT
Start: 2024-03-05 | End: 2024-03-05

## 2024-03-05 RX ORDER — NIFEDIPINE 30 MG
60 TABLET, EXTENDED RELEASE 24 HR ORAL DAILY
Refills: 0 | Status: DISCONTINUED | OUTPATIENT
Start: 2024-03-05 | End: 2024-03-08

## 2024-03-05 RX ORDER — SODIUM CHLORIDE 9 MG/ML
1000 INJECTION, SOLUTION INTRAVENOUS
Refills: 0 | Status: DISCONTINUED | OUTPATIENT
Start: 2024-03-05 | End: 2024-03-08

## 2024-03-05 RX ORDER — GLUCAGON INJECTION, SOLUTION 0.5 MG/.1ML
1 INJECTION, SOLUTION SUBCUTANEOUS ONCE
Refills: 0 | Status: DISCONTINUED | OUTPATIENT
Start: 2024-03-05 | End: 2024-03-08

## 2024-03-05 RX ORDER — DEXTROSE 50 % IN WATER 50 %
25 SYRINGE (ML) INTRAVENOUS ONCE
Refills: 0 | Status: DISCONTINUED | OUTPATIENT
Start: 2024-03-05 | End: 2024-03-08

## 2024-03-05 RX ORDER — SITAGLIPTIN AND METFORMIN HYDROCHLORIDE 500; 50 MG/1; MG/1
1 TABLET, FILM COATED ORAL
Refills: 0 | DISCHARGE

## 2024-03-05 RX ORDER — LEVETIRACETAM 250 MG/1
1 TABLET, FILM COATED ORAL
Refills: 0 | DISCHARGE

## 2024-03-05 RX ORDER — SODIUM CHLORIDE 9 MG/ML
1000 INJECTION, SOLUTION INTRAVENOUS ONCE
Refills: 0 | Status: DISCONTINUED | OUTPATIENT
Start: 2024-03-05 | End: 2024-03-05

## 2024-03-05 RX ORDER — LABETALOL HCL 100 MG
10 TABLET ORAL ONCE
Refills: 0 | Status: COMPLETED | OUTPATIENT
Start: 2024-03-05 | End: 2024-03-05

## 2024-03-05 RX ORDER — DEXTROSE 50 % IN WATER 50 %
12.5 SYRINGE (ML) INTRAVENOUS ONCE
Refills: 0 | Status: DISCONTINUED | OUTPATIENT
Start: 2024-03-05 | End: 2024-03-08

## 2024-03-05 RX ORDER — ASPIRIN/CALCIUM CARB/MAGNESIUM 324 MG
81 TABLET ORAL DAILY
Refills: 0 | Status: DISCONTINUED | OUTPATIENT
Start: 2024-03-05 | End: 2024-03-08

## 2024-03-05 RX ORDER — ATORVASTATIN CALCIUM 80 MG/1
20 TABLET, FILM COATED ORAL AT BEDTIME
Refills: 0 | Status: DISCONTINUED | OUTPATIENT
Start: 2024-03-05 | End: 2024-03-08

## 2024-03-05 RX ORDER — LEVETIRACETAM 250 MG/1
500 TABLET, FILM COATED ORAL
Refills: 0 | Status: DISCONTINUED | OUTPATIENT
Start: 2024-03-05 | End: 2024-03-08

## 2024-03-05 RX ORDER — APIXABAN 2.5 MG/1
5 TABLET, FILM COATED ORAL EVERY 12 HOURS
Refills: 0 | Status: DISCONTINUED | OUTPATIENT
Start: 2024-03-06 | End: 2024-03-06

## 2024-03-05 RX ORDER — INSULIN LISPRO 100/ML
VIAL (ML) SUBCUTANEOUS
Refills: 0 | Status: DISCONTINUED | OUTPATIENT
Start: 2024-03-05 | End: 2024-03-08

## 2024-03-05 RX ORDER — DEXTROSE 50 % IN WATER 50 %
15 SYRINGE (ML) INTRAVENOUS ONCE
Refills: 0 | Status: DISCONTINUED | OUTPATIENT
Start: 2024-03-05 | End: 2024-03-08

## 2024-03-05 RX ORDER — PIOGLITAZONE HYDROCHLORIDE 15 MG/1
1 TABLET ORAL
Refills: 0 | DISCHARGE

## 2024-03-05 RX ADMIN — Medication 10 MILLIGRAM(S): at 23:56

## 2024-03-05 RX ADMIN — INSULIN HUMAN 10 UNIT(S): 100 INJECTION, SOLUTION SUBCUTANEOUS at 21:44

## 2024-03-05 RX ADMIN — Medication 10 MILLIGRAM(S): at 22:36

## 2024-03-05 NOTE — ED ADULT NURSE NOTE - NSFALLHARMRISKINTERV_ED_ALL_ED
Assistance OOB with selected safe patient handling equipment if applicable/Assistance with ambulation/Communicate risk of Fall with Harm to all staff, patient, and family/Monitor gait and stability/Provide visual cue: red socks, yellow wristband, yellow gown, etc/Reinforce activity limits and safety measures with patient and family/Bed in lowest position, wheels locked, appropriate side rails in place/Call bell, personal items and telephone in reach/Instruct patient to call for assistance before getting out of bed/chair/stretcher/Non-slip footwear applied when patient is off stretcher/[place] to call system/Physically safe environment - no spills, clutter or unnecessary equipment/Purposeful Proactive Rounding/Room/bathroom lighting operational, light cord in reach

## 2024-03-05 NOTE — H&P ADULT - ASSESSMENT
74M w/ PMH of HTN, HLD, DMII (on insulin), L occipital punctate infarct (on asa 81 mg qd), seizures (on keppra 500 mg BID and lacosamide 100 mg qd) also on eliquis 5 mg qd pt unsure why (from prior chart review pt was supposed to be on full AC for 3 months after R UE vein thrombosis in August 2023 possible that med was never discontinued) who presents with cc of R LE weakness x 3 days with CTH showed L thalamic hypodensity concern for possible thalamic infarct.     Plan  # R LE weakness possibly 2/2 L thalamic infarct  - MR Brain non-con  - continue asa 81 mg qd  - continue home eliquis 5 mg qd for now will re-evaluate in AM  - EP consult to interrogate loop recorder  - q8 neurochecks  - f/u TSH, lipid panel, a1c  - PT/OT    #Seizures, chronic  - continue home keppra 500 mg BID and lacosamide 100 mg BID    #HTN, chronic  - SBP goal 120-160  - continue lisinopril  - start nifedipine 60 mg qd, pt was previously on it and blood pressure is uncontrolled    #DM, chronic  - ISS   - glucose accuchecks    #HLD, chronic  - f/u lipid panel   - continue home atorvastatin 20 mg qd      Diet: DASH, consistent carb  DVT ppx: lovenox      74M w/ PMH of HTN, HLD, DMII (on insulin), L occipital punctate infarct (on asa 81 mg qd), seizures (on keppra 500 mg BID and lacosamide 100 mg qd) also on eliquis 5 mg qd pt unsure why (from prior chart review pt was supposed to be on full AC for 3 months after R UE vein thrombosis in August 2023 possible that med was never discontinued) who presents with cc of R LE weakness x 3 days with CTH showed L thalamic hypodensity concern for possible thalamic infarct.     Plan  # R LE weakness possibly 2/2 L thalamic infarct  - MR Brain non-con  - continue asa 81 mg qd  - continue home eliquis 5 mg qd for now will re-evaluate in AM  - EP consult to interrogate loop recorder  - q8 neurochecks  - f/u TSH, lipid panel, a1c  - PT/OT    #Seizures, chronic  - continue home keppra 500 mg BID and lacosamide 100 mg BID    #HTN, chronic  - SBP goal 120-160  - continue lisinopril  - start nifedipine 60 mg qd, pt was previously on it and blood pressure is uncontrolled    #DM, chronic  - ISS   - glucose accuchecks    #HLD, chronic  - f/u lipid panel   - continue home atorvastatin 20 mg qd      Diet: DASH, consistent carb  DVT ppx: on eliquis      74M w/ PMH of HTN, HLD, DMII (on insulin), L occipital punctate infarct (on asa 81 mg qd), seizures (on keppra 500 mg BID and lacosamide 100 mg qd) also on eliquis 5 mg qd pt unsure why (from prior chart review pt was supposed to be on full AC for 3 months after R UE vein thrombosis in August 2023 possible that med was never discontinued) who presents with cc of R LE weakness x 3 days with CTH showed L thalamic hypodensity concern for possible thalamic infarct.     Plan  # R LE weakness possibly 2/2 L thalamic infarct  - MR Brain non-con  - continue asa 81 mg qd  - continue home eliquis 5 mg qd for now will re-evaluate in AM  - EP consult to interrogate loop recorder  - q8 neurochecks  - f/u TSH, lipid panel, a1c  - PT/OT    #Seizures, chronic  - continue home keppra 500 mg BID and lacosamide 100 mg BID    #HTN, chronic  - SBP goal 120-160  - continue lisinopril 40 mg qd  - start nifedipine 60 mg qd, pt was previously on it and blood pressure is uncontrolled    #DM, chronic  - ISS   - glucose accuchecks    #HLD, chronic  - f/u lipid panel   - continue home atorvastatin 20 mg qd      Diet: DASH, consistent carb  DVT ppx: on eliquis

## 2024-03-05 NOTE — ED ADULT TRIAGE NOTE - CHIEF COMPLAINT QUOTE
Since sunday decreased ambulatory status. Per family member "he can barely use his right side, which is not abnormal he has hx of cva with right side weakness". Family says he is walking better today than he was the day before. Family reports weakness over past few days. Pt states the only thing bothering him is his leg, denies trauma.  Per family "he does not have toes on the right foot he has a problem walking and noticed his right foot was swollen." Hx htn, took medication this morning.

## 2024-03-05 NOTE — ED PROVIDER NOTE - OBJECTIVE STATEMENT
74-year-old male with a past medical history of hypertension, hyperlipidemia, diabetes, bipolar disorder, BPH, GERD, DFU status post multiple toe amputations presents the ED for evaluation of weakness.  Patient with right-sided lower extremity weakness over the last 3 days.  No palliating provoking factors.  Patient admits to having difficulty walking.  Denies any fevers, chills, numbness, tingling, chest pain, shortness of breath, headache, vision changes, dysuria.

## 2024-03-05 NOTE — ED PROVIDER NOTE - PHYSICAL EXAMINATION
CONST: Well appearing in NAD  EYES: PERRL, EOMI, Sclera and conjunctiva clear.   ENT: Oropharynx normal appearing, no erythema or exudates. Uvula midline.  CARD: Normal S1 S2; Normal rate and rhythm  RESP: Equal BS B/L, No wheezes, rhonchi or rales. No distress  GI: Soft, non-tender, non-distended.  MS: Normal ROM in all extremities. No midline spinal tenderness.  SKIN: Warm, dry, no acute rashes.   NEURO: A&Ox3, No focal deficits. Strength 5/5 LLE/LUE, 4/5 RUE, RLE with no sensory deficits. Able to stand, but w/o assistance, but unable to ambulate.

## 2024-03-05 NOTE — ED PROVIDER NOTE - ATTENDING APP SHARED VISIT CONTRIBUTION OF CARE
74M PMH TIA HTN HL DM bipolar disease, BPH GERD DFU s/p toe amputions p/w RLE weakness x 3 days. difficulty ambulating as a result. no fall or loc. no ha, neck pain, bp. no cp, sob. no fever, cough. no abd pain, nvdc. no urinary sx. no arm numbness or weakness. no blurry vision, slurred speech, facial droop. wife at bedside.     on exam, AFVSS, well jessica nad, ncat, eomi, perrla, mmm, lctab, rrr nl s1s2 no mrg, abd soft ntnd, aaox3, CN 2-12 intact, No nystagmus.  5/5 motor x 3 ext, RLE 4/5 strength, SILT x 4 extremities, No facial droop or slurred speech. No pronator drift.  Normal rapid alternating movement and finger nose finger bilaterally. No midline C/T/L tenderness to palpation or step off. Normal gait, No ataxia. no le edema or calf ttp,     a/p; concern for cva, hypertensive emergency, labs, ct scan, ekg, neurology consult, bp control     Attending Statement: I have personally provided the amount of critical care time documented below excluding time spent on separate procedures.     Critical Care Time Spent (min) Must be 30 or more minutes to qualify: 35.

## 2024-03-05 NOTE — H&P ADULT - HISTORY OF PRESENT ILLNESS
NEUROLOGY CONSULT    HPI: 74M w/ PMH of HTN, HLD, DMII (on insulin), L occipital punctate infarct (on asa 81 mg qd), seizures (on keppra 500 mg BID and lacosamide 100 mg qd) also on eliquis 5 mg qd pt unsure why (from prior chart review pt was supposed to be on full AC for 3 months after R UE vein thrombosis in August 2023 possible that med was never discontinued) who presents with cc of R LE weakness that started when he woke up in the morning on Sunday to the point where he had difficulty walking but denies any numbness. He denies any blurry vision, diplopia, numbness, dizziness, headache. He reports compliance with medications.         FAMILY HISTORY:  Family history of lung cancer (Mother)    Family history of ischemic heart disease (IHD) (Father)      SOCIAL HISTORY: negative for tobacco, alcohol, or ilicit drug use.    Allergies    No Known Allergies    Intolerances        Neurological Examination:  General:  Appearance is consistent with chronologic age.   Cognitive/Language:  Awake, alert, and oriented to person, place, time and date.  Recent and remote memory intact.  Fund of knowledge is appropriate.  Naming, repetition and comprehension intact. Nondysarthric.    Cranial Nerves  - Eyes: Visual fields full.  EOMI w/o nystagmus, skew or reported double vision.  PERRL.  No ptosis/weakness of eyelid closure.    - Face:  Facial sensation normal V1 - 3, no facial asymmetry.    - Ears/Nose/Throat:  Hearing grossly intact b/l to finger rub.  Palate elevates midline.  Tongue and uvula midline.   Motor exam: Normal tone and bulk. No tenderness, twitching, tremors or involuntary movements. R UE drift does not hit the bed, R LE drift does not hit the bed            Upper extremity                  Bicep     Tricep     HG                                                 R      5/5        5/5          5/5                                                    L       5/5        5/5          5/5              Lower extremity                   HF        KE        DF         PF                                                  R     5/5       5/5       5/5       5/5                                               L      5/5      5/5       5/5        5/5    Sensory examination:  Intact to light touch and pinprick, pain, temperature and proprioception and vibration in all extremities.  Reflexes: 2+ b/l biceps, triceps, patella and achilles.  Plantar response downgoing b/l.  Harman, clonus absent.  Cerebellum: FTN R UE dysmetria L UE normal, HTS intact.     NIHSS: 3    LABS:                        13.4   6.45  )-----------( 188      ( 05 Mar 2024 20:50 )             40.7     03-05    141  |  101  |  16  ----------------------------<  493<HH>  5.3<H>   |  25  |  1.1    Ca    9.6      05 Mar 2024 20:23    TPro  7.1  /  Alb  4.5  /  TBili  0.5  /  DBili  x   /  AST  21  /  ALT  18  /  AlkPhos  127<H>  03-05    Hemoglobin A1C:   Vitamin B12   PT/INR - ( 05 Mar 2024 20:23 )   PT: 10.40 sec;   INR: 0.91 ratio         PTT - ( 05 Mar 2024 20:23 )  PTT:23.2 sec  CAPILLARY BLOOD GLUCOSE      POCT Blood Glucose.: 432 mg/dL (05 Mar 2024 21:14)      Urinalysis Basic - ( 05 Mar 2024 20:23 )    Color: x / Appearance: x / SG: x / pH: x  Gluc: 493 mg/dL / Ketone: x  / Bili: x / Urobili: x   Blood: x / Protein: x / Nitrite: x   Leuk Esterase: x / RBC: x / WBC x   Sq Epi: x / Non Sq Epi: x / Bacteria: x            Microbiology:      RADIOLOGY, EKG AND ADDITIONAL TESTS: Reviewed.    < from: CT Head No Cont (03.05.24 @ 20:57) >  New focal patchy hypodensity in the left thalamus compared to the prior   CT head from 8/9/2023, likely representing an acute infarct. No   intracranial hemorrhage or midline shift.  R temporal parietl encephalomalacia chronic  Stable mild chronic microvascular ischemic changes.    < from: CT Angio Neck w/ IV Cont (03.05.24 @ 21:13) >  Patent bilateral PCA. Focal moderate stenosis of the proximal left P3   segment ().    No large vessel occlusion high-grade stenosis or aneurysm.    < end of copied text >

## 2024-03-06 LAB
A1C WITH ESTIMATED AVERAGE GLUCOSE RESULT: 9.2 % — HIGH (ref 4–5.6)
ALBUMIN SERPL ELPH-MCNC: 3.9 G/DL — SIGNIFICANT CHANGE UP (ref 3.5–5.2)
ALP SERPL-CCNC: 88 U/L — SIGNIFICANT CHANGE UP (ref 30–115)
ALT FLD-CCNC: 15 U/L — SIGNIFICANT CHANGE UP (ref 0–41)
ANION GAP SERPL CALC-SCNC: 9 MMOL/L — SIGNIFICANT CHANGE UP (ref 7–14)
AST SERPL-CCNC: 14 U/L — SIGNIFICANT CHANGE UP (ref 0–41)
BILIRUB SERPL-MCNC: 0.4 MG/DL — SIGNIFICANT CHANGE UP (ref 0.2–1.2)
BUN SERPL-MCNC: 13 MG/DL — SIGNIFICANT CHANGE UP (ref 10–20)
CALCIUM SERPL-MCNC: 9.1 MG/DL — SIGNIFICANT CHANGE UP (ref 8.4–10.5)
CHLORIDE SERPL-SCNC: 105 MMOL/L — SIGNIFICANT CHANGE UP (ref 98–110)
CHOLEST SERPL-MCNC: 226 MG/DL — HIGH
CO2 SERPL-SCNC: 29 MMOL/L — SIGNIFICANT CHANGE UP (ref 17–32)
CREAT SERPL-MCNC: 0.8 MG/DL — SIGNIFICANT CHANGE UP (ref 0.7–1.5)
EGFR: 92 ML/MIN/1.73M2 — SIGNIFICANT CHANGE UP
ESTIMATED AVERAGE GLUCOSE: 217 MG/DL — HIGH (ref 68–114)
GLUCOSE SERPL-MCNC: 131 MG/DL — HIGH (ref 70–99)
HCT VFR BLD CALC: 37.4 % — LOW (ref 42–52)
HDLC SERPL-MCNC: 64 MG/DL — SIGNIFICANT CHANGE UP
HGB BLD-MCNC: 12.3 G/DL — LOW (ref 14–18)
LIPID PNL WITH DIRECT LDL SERPL: 152 MG/DL — HIGH
MCHC RBC-ENTMCNC: 30.1 PG — SIGNIFICANT CHANGE UP (ref 27–31)
MCHC RBC-ENTMCNC: 32.9 G/DL — SIGNIFICANT CHANGE UP (ref 32–37)
MCV RBC AUTO: 91.7 FL — SIGNIFICANT CHANGE UP (ref 80–94)
NON HDL CHOLESTEROL: 162 MG/DL — HIGH
NRBC # BLD: 0 /100 WBCS — SIGNIFICANT CHANGE UP (ref 0–0)
PLATELET # BLD AUTO: 181 K/UL — SIGNIFICANT CHANGE UP (ref 130–400)
PMV BLD: 11.7 FL — HIGH (ref 7.4–10.4)
POTASSIUM SERPL-MCNC: 3.7 MMOL/L — SIGNIFICANT CHANGE UP (ref 3.5–5)
POTASSIUM SERPL-SCNC: 3.7 MMOL/L — SIGNIFICANT CHANGE UP (ref 3.5–5)
PROT SERPL-MCNC: 6.1 G/DL — SIGNIFICANT CHANGE UP (ref 6–8)
RBC # BLD: 4.08 M/UL — LOW (ref 4.7–6.1)
RBC # FLD: 13.1 % — SIGNIFICANT CHANGE UP (ref 11.5–14.5)
SODIUM SERPL-SCNC: 143 MMOL/L — SIGNIFICANT CHANGE UP (ref 135–146)
TRIGL SERPL-MCNC: 47 MG/DL — SIGNIFICANT CHANGE UP
WBC # BLD: 5.77 K/UL — SIGNIFICANT CHANGE UP (ref 4.8–10.8)
WBC # FLD AUTO: 5.77 K/UL — SIGNIFICANT CHANGE UP (ref 4.8–10.8)

## 2024-03-06 PROCEDURE — 93970 EXTREMITY STUDY: CPT | Mod: 26

## 2024-03-06 PROCEDURE — 93291 INTERROG DEV EVAL SCRMS IP: CPT | Mod: 26

## 2024-03-06 PROCEDURE — 99222 1ST HOSP IP/OBS MODERATE 55: CPT

## 2024-03-06 RX ORDER — CLOPIDOGREL BISULFATE 75 MG/1
75 TABLET, FILM COATED ORAL DAILY
Refills: 0 | Status: DISCONTINUED | OUTPATIENT
Start: 2024-03-06 | End: 2024-03-08

## 2024-03-06 RX ORDER — ALPRAZOLAM 0.25 MG
0.25 TABLET ORAL ONCE
Refills: 0 | Status: DISCONTINUED | OUTPATIENT
Start: 2024-03-06 | End: 2024-03-06

## 2024-03-06 RX ADMIN — APIXABAN 5 MILLIGRAM(S): 2.5 TABLET, FILM COATED ORAL at 05:33

## 2024-03-06 RX ADMIN — Medication 60 MILLIGRAM(S): at 05:35

## 2024-03-06 RX ADMIN — LEVETIRACETAM 500 MILLIGRAM(S): 250 TABLET, FILM COATED ORAL at 05:35

## 2024-03-06 RX ADMIN — LACOSAMIDE 100 MILLIGRAM(S): 50 TABLET ORAL at 05:38

## 2024-03-06 RX ADMIN — Medication 6: at 17:29

## 2024-03-06 RX ADMIN — LISINOPRIL 40 MILLIGRAM(S): 2.5 TABLET ORAL at 05:33

## 2024-03-06 RX ADMIN — LACOSAMIDE 100 MILLIGRAM(S): 50 TABLET ORAL at 17:39

## 2024-03-06 RX ADMIN — ATORVASTATIN CALCIUM 20 MILLIGRAM(S): 80 TABLET, FILM COATED ORAL at 21:18

## 2024-03-06 RX ADMIN — Medication 0.25 MILLIGRAM(S): at 18:53

## 2024-03-06 RX ADMIN — Medication 81 MILLIGRAM(S): at 11:50

## 2024-03-06 RX ADMIN — INSULIN GLARGINE 20 UNIT(S): 100 INJECTION, SOLUTION SUBCUTANEOUS at 11:56

## 2024-03-06 RX ADMIN — LEVETIRACETAM 500 MILLIGRAM(S): 250 TABLET, FILM COATED ORAL at 17:39

## 2024-03-06 RX ADMIN — Medication 2: at 11:52

## 2024-03-06 NOTE — PHYSICAL THERAPY INITIAL EVALUATION ADULT - GENERAL OBSERVATIONS, REHAB EVAL
Pt seen in the ED 4 from [id number]. Pt encountered in the bed, c/o pain in batool LE, agreeable for b/s PT.

## 2024-03-06 NOTE — PATIENT PROFILE ADULT - FALL HARM RISK - HARM RISK INTERVENTIONS

## 2024-03-06 NOTE — CHART NOTE - NSCHARTNOTEFT_GEN_A_CORE
Electrophysiology    Pts device ___LINK2  [employer]___ was interrogated on 03-06-24  Device working properly  Events: no events since last check 11/20/23    Underlying rhythm NSR  Battery good        Contact EP ACP with any questions [phone]

## 2024-03-06 NOTE — PHYSICAL THERAPY INITIAL EVALUATION ADULT - WEIGHT-BEARING RESTRICTIONS: SIT/STAND, REHAB EVAL
weight-bearing as tolerated Tremfya Counseling: I discussed with the patient the risks of guselkumab including but not limited to immunosuppression, serious infections, worsening of inflammatory bowel disease and drug reactions.  The patient understands that monitoring is required including a PPD at baseline and must alert us or the primary physician if symptoms of infection or other concerning signs are noted.

## 2024-03-06 NOTE — PHYSICAL THERAPY INITIAL EVALUATION ADULT - PERTINENT HX OF CURRENT PROBLEM, REHAB EVAL
74M w/ PMH of HTN, HLD, DMII (on insulin), L occipital punctate infarct (on asa 81 mg qd), seizures (on keppra 500 mg BID and lacosamide 100 mg qd) also on eliquis 5 mg qd pt unsure why (from prior chart review pt was supposed to be on full AC for 3 months after R UE vein thrombosis in August 2023 possible that med was never discontinued) who presents with cc of R LE weakness x 3 days with CTH showed L thalamic hypodensity concern for possible thalamic infarct.

## 2024-03-06 NOTE — PHYSICAL THERAPY INITIAL EVALUATION ADULT - ADDITIONAL COMMENTS
Pt reported lives in a pvt house with ex wife, + ramp to enter, Pt stays on 1st floor.  Pt reported he has a wheelchair.

## 2024-03-06 NOTE — PROGRESS NOTE ADULT - ATTENDING COMMENTS
75 y/o gentleman with hx of HTN, HLD, DM, and prior left occipital infarct with known residual deficits, seizure disorder, and RUE DVT on Eliquis who presented with 3 day course of RLE weakness. CTH showing left thalamic hypodensity. CTA showing no concerning vessel findings. Patient reports intermittent compliance with meds. Etiology like small vessel angiopathy vs cardioembolic.    Start ASA/Plavix and high-intensity statin. Can D/C Eliquis given >6 months from RUE DVT s/p assessment of interval US. Continue seizure meds. Obtain MRI brain and 2D ECHO. EP reporting no concerning findings on interrogation of ILR. Stroke labs. PT/OT.

## 2024-03-07 ENCOUNTER — RESULT REVIEW (OUTPATIENT)
Age: 74
End: 2024-03-07

## 2024-03-07 LAB
ALBUMIN SERPL ELPH-MCNC: 3.9 G/DL — SIGNIFICANT CHANGE UP (ref 3.5–5.2)
ALP SERPL-CCNC: 81 U/L — SIGNIFICANT CHANGE UP (ref 30–115)
ALT FLD-CCNC: 14 U/L — SIGNIFICANT CHANGE UP (ref 0–41)
ANION GAP SERPL CALC-SCNC: 16 MMOL/L — HIGH (ref 7–14)
AST SERPL-CCNC: 18 U/L — SIGNIFICANT CHANGE UP (ref 0–41)
BILIRUB SERPL-MCNC: 0.6 MG/DL — SIGNIFICANT CHANGE UP (ref 0.2–1.2)
BUN SERPL-MCNC: 17 MG/DL — SIGNIFICANT CHANGE UP (ref 10–20)
CALCIUM SERPL-MCNC: 9.2 MG/DL — SIGNIFICANT CHANGE UP (ref 8.4–10.5)
CHLORIDE SERPL-SCNC: 108 MMOL/L — SIGNIFICANT CHANGE UP (ref 98–110)
CO2 SERPL-SCNC: 22 MMOL/L — SIGNIFICANT CHANGE UP (ref 17–32)
CREAT SERPL-MCNC: 1 MG/DL — SIGNIFICANT CHANGE UP (ref 0.7–1.5)
EGFR: 78 ML/MIN/1.73M2 — SIGNIFICANT CHANGE UP
GLUCOSE SERPL-MCNC: 63 MG/DL — LOW (ref 70–99)
HCT VFR BLD CALC: 39.3 % — LOW (ref 42–52)
HGB BLD-MCNC: 13.1 G/DL — LOW (ref 14–18)
MCHC RBC-ENTMCNC: 30.3 PG — SIGNIFICANT CHANGE UP (ref 27–31)
MCHC RBC-ENTMCNC: 33.3 G/DL — SIGNIFICANT CHANGE UP (ref 32–37)
MCV RBC AUTO: 91 FL — SIGNIFICANT CHANGE UP (ref 80–94)
NRBC # BLD: 0 /100 WBCS — SIGNIFICANT CHANGE UP (ref 0–0)
PLATELET # BLD AUTO: 144 K/UL — SIGNIFICANT CHANGE UP (ref 130–400)
PMV BLD: 11.7 FL — HIGH (ref 7.4–10.4)
POTASSIUM SERPL-MCNC: 4.1 MMOL/L — SIGNIFICANT CHANGE UP (ref 3.5–5)
POTASSIUM SERPL-SCNC: 4.1 MMOL/L — SIGNIFICANT CHANGE UP (ref 3.5–5)
PROT SERPL-MCNC: 6.1 G/DL — SIGNIFICANT CHANGE UP (ref 6–8)
RBC # BLD: 4.32 M/UL — LOW (ref 4.7–6.1)
RBC # FLD: 13 % — SIGNIFICANT CHANGE UP (ref 11.5–14.5)
SODIUM SERPL-SCNC: 146 MMOL/L — SIGNIFICANT CHANGE UP (ref 135–146)
WBC # BLD: 5.47 K/UL — SIGNIFICANT CHANGE UP (ref 4.8–10.8)
WBC # FLD AUTO: 5.47 K/UL — SIGNIFICANT CHANGE UP (ref 4.8–10.8)

## 2024-03-07 PROCEDURE — 70551 MRI BRAIN STEM W/O DYE: CPT | Mod: 26

## 2024-03-07 PROCEDURE — 93971 EXTREMITY STUDY: CPT | Mod: 26,RT

## 2024-03-07 PROCEDURE — 99232 SBSQ HOSP IP/OBS MODERATE 35: CPT

## 2024-03-07 RX ORDER — INSULIN LISPRO 100/ML
6 VIAL (ML) SUBCUTANEOUS ONCE
Refills: 0 | Status: COMPLETED | OUTPATIENT
Start: 2024-03-07 | End: 2024-03-07

## 2024-03-07 RX ADMIN — LEVETIRACETAM 500 MILLIGRAM(S): 250 TABLET, FILM COATED ORAL at 18:31

## 2024-03-07 RX ADMIN — Medication 81 MILLIGRAM(S): at 12:38

## 2024-03-07 RX ADMIN — LACOSAMIDE 100 MILLIGRAM(S): 50 TABLET ORAL at 06:34

## 2024-03-07 RX ADMIN — Medication 60 MILLIGRAM(S): at 06:35

## 2024-03-07 RX ADMIN — LISINOPRIL 40 MILLIGRAM(S): 2.5 TABLET ORAL at 06:34

## 2024-03-07 RX ADMIN — Medication 4: at 18:47

## 2024-03-07 RX ADMIN — CLOPIDOGREL BISULFATE 75 MILLIGRAM(S): 75 TABLET, FILM COATED ORAL at 12:38

## 2024-03-07 RX ADMIN — LEVETIRACETAM 500 MILLIGRAM(S): 250 TABLET, FILM COATED ORAL at 06:34

## 2024-03-07 RX ADMIN — LACOSAMIDE 100 MILLIGRAM(S): 50 TABLET ORAL at 18:31

## 2024-03-07 RX ADMIN — Medication 4: at 12:44

## 2024-03-07 RX ADMIN — Medication 6 UNIT(S): at 23:45

## 2024-03-07 RX ADMIN — ATORVASTATIN CALCIUM 20 MILLIGRAM(S): 80 TABLET, FILM COATED ORAL at 23:45

## 2024-03-07 RX ADMIN — INSULIN GLARGINE 20 UNIT(S): 100 INJECTION, SOLUTION SUBCUTANEOUS at 09:02

## 2024-03-07 NOTE — OCCUPATIONAL THERAPY INITIAL EVALUATION ADULT - ADL RETRAINING, OT EVAL
Patient will perform upper body dressing with supervision by discharge. Patient will perform lower body dressing with min assistance with use of appropriate adaptive equipment as needed by discharge.

## 2024-03-07 NOTE — PROGRESS NOTE ADULT - TIME BILLING
review of history and chart, physical examination, review of imaging, discussion of treatment plan, risk factors, and important lifestyle modifications.
review of history and chart, physical examination, review of imaging, discussion of medication compliance, treatment plan, risk factors, and important lifestyle modifications

## 2024-03-07 NOTE — PROGRESS NOTE ADULT - ATTENDING COMMENTS
73 y/o gentleman with hx of HTN, HLD, DM, and prior left occipital infarct with known residual deficits, seizure disorder, and RUE DVT on Eliquis who presented with 3 day course of RLE weakness. CTH showing left thalamic hypodensity. CTA showing no concerning vessel findings. Patient reports intermittent compliance with meds. Etiology like small vessel angiopathy vs cardioembolic.    RUE and B/L LE ultrasounds negative for DVT. Continue ASA/Plavix and high-intensity statin. Continue seizure meds. Pending MRI brain and 2D ECHO. EP reporting no concerning findings on interrogation of ILR. Stroke labs. PT/OT. SW for dispo

## 2024-03-07 NOTE — OCCUPATIONAL THERAPY INITIAL EVALUATION ADULT - GENERAL OBSERVATIONS, REHAB EVAL
Pt was received semi gomez in bed, in NAD, agreeable to OT Evaluation. +IV Lock. BP within parameters throughout session, pt was left with PT Jan. AMPARO Leigh aware

## 2024-03-07 NOTE — OCCUPATIONAL THERAPY INITIAL EVALUATION ADULT - NSOTDISCHREC_GEN_A_CORE
Pt requires assistance with functional mobility and ADLs, would benefit from rehab facility, however pt vocalizes for Home OT, if pt d/c home would require 24 hr home assistance

## 2024-03-07 NOTE — PROGRESS NOTE ADULT - SUBJECTIVE AND OBJECTIVE BOX
Neurology Progress Note    Interval History:  The patient was seen and examined at the bedside.       PAST MEDICAL & SURGICAL HISTORY:  DM (diabetes mellitus)  Type 2, 12/27/18 hgb aic  11.2    HTN (hypertension)    Dyslipidemia    Diabetic foot ulcer    Depression    GERD (gastroesophageal reflux disease)    Neuropathy, diabetic    Toe amputation status, right  (2008)    History of amputation of toe  LT -partial 1st toe            Medications:  aspirin enteric coated 81 milliGRAM(s) Oral daily  atorvastatin 20 milliGRAM(s) Oral at bedtime  clopidogrel Tablet 75 milliGRAM(s) Oral daily  dextrose 5%. 1000 milliLiter(s) IV Continuous <Continuous>  dextrose 5%. 1000 milliLiter(s) IV Continuous <Continuous>  dextrose 50% Injectable 25 Gram(s) IV Push once  dextrose 50% Injectable 12.5 Gram(s) IV Push once  dextrose 50% Injectable 25 Gram(s) IV Push once  dextrose Oral Gel 15 Gram(s) Oral once PRN  glucagon  Injectable 1 milliGRAM(s) IntraMuscular once  insulin glargine Injectable (LANTUS) 20 Unit(s) SubCutaneous every morning  insulin lispro (ADMELOG) corrective regimen sliding scale   SubCutaneous three times a day before meals  lacosamide 100 milliGRAM(s) Oral two times a day  levETIRAcetam 500 milliGRAM(s) Oral two times a day  lisinopril 40 milliGRAM(s) Oral daily  NIFEdipine XL 60 milliGRAM(s) Oral daily      Vital Signs Last 24 Hrs  T(C): 36.7 (06 Mar 2024 08:09), Max: 36.8 (06 Mar 2024 05:32)  T(F): 98 (06 Mar 2024 08:09), Max: 98.2 (06 Mar 2024 05:32)  HR: 64 (06 Mar 2024 08:09) (64 - 90)  BP: 132/82 (06 Mar 2024 08:09) (132/82 - 235/105)  BP(mean): --  RR: 18 (06 Mar 2024 08:09) (16 - 18)  SpO2: 99% (06 Mar 2024 08:09) (98% - 99%)    Parameters below as of 06 Mar 2024 08:09  Patient On (Oxygen Delivery Method): room air    Neurological Examination:  General:  Appearance is consistent with chronologic age.   Cognitive/Language:  Awake, alert, and oriented to person, place, time and date.  Recent and remote memory intact.  Fund of knowledge is appropriate.  Naming, repetition and comprehension intact. Nondysarthric.    Cranial Nerves  - Eyes: Visual fields full.  EOMI w/o nystagmus, skew or reported double vision.  PERRL.  No ptosis/weakness of eyelid closure.    - Face:  Facial sensation normal V1 - 3, no facial asymmetry.    - Ears/Nose/Throat:  Hearing grossly intact b/l to finger rub.  Palate elevates midline.  Tongue and uvula midline.   Motor exam: Normal tone and bulk. No tenderness, twitching, tremors or involuntary movements. R UE drift does not hit the bed, R LE drift does not hit the bed  Sensory examination:  Intact to light touch and pinprick, pain, temperature and proprioception and vibration in all extremities.  Reflexes: 2+ b/l biceps, triceps, patella and achilles.  Plantar response downgoing on the L. No toes on R.  Harman, clonus absent.  Cerebellum: FTN R UE dysmetria L UE normal, HTS intact.     NIHSS: 3      Labs:  CBC Full  -  ( 06 Mar 2024 07:41 )  WBC Count : 5.77 K/uL  RBC Count : 4.08 M/uL  Hemoglobin : 12.3 g/dL  Hematocrit : 37.4 %  Platelet Count - Automated : 181 K/uL  Mean Cell Volume : 91.7 fL  Mean Cell Hemoglobin : 30.1 pg  Mean Cell Hemoglobin Concentration : 32.9 g/dL  Auto Neutrophil # : x  Auto Lymphocyte # : x  Auto Monocyte # : x  Auto Eosinophil # : x  Auto Basophil # : x  Auto Neutrophil % : x  Auto Lymphocyte % : x  Auto Monocyte % : x  Auto Eosinophil % : x  Auto Basophil % : x    03-06    143  |  105  |  13  ----------------------------<  131<H>  3.7   |  29  |  0.8    Ca    9.1      06 Mar 2024 07:41    TPro  6.1  /  Alb  3.9  /  TBili  0.4  /  DBili  x   /  AST  14  /  ALT  15  /  AlkPhos  88  03-06    LIVER FUNCTIONS - ( 06 Mar 2024 07:41 )  Alb: 3.9 g/dL / Pro: 6.1 g/dL / ALK PHOS: 88 U/L / ALT: 15 U/L / AST: 14 U/L / GGT: x           PT/INR - ( 05 Mar 2024 20:23 )   PT: 10.40 sec;   INR: 0.91 ratio         PTT - ( 05 Mar 2024 20:23 )  PTT:23.2 sec  Urinalysis Basic - ( 06 Mar 2024 07:41 )    Color: x / Appearance: x / SG: x / pH: x  Gluc: 131 mg/dL / Ketone: x  / Bili: x / Urobili: x   Blood: x / Protein: x / Nitrite: x   Leuk Esterase: x / RBC: x / WBC x   Sq Epi: x / Non Sq Epi: x / Bacteria: x        Assessment:  This is a 74y Male w/ h/o     Plan:
Neurology Progress Note    Interval History:  The patient was seen and examined at the bedside. Eating breakfast, interactive. No complaints.      PAST MEDICAL & SURGICAL HISTORY:  DM (diabetes mellitus)  Type 2, 12/27/18 hgb aic  11.2    HTN (hypertension)    Dyslipidemia    Diabetic foot ulcer    Depression    GERD (gastroesophageal reflux disease)    Neuropathy, diabetic    Toe amputation status, right  (2008)    History of amputation of toe  LT -partial 1st toe            Medications:  aspirin enteric coated 81 milliGRAM(s) Oral daily  atorvastatin 20 milliGRAM(s) Oral at bedtime  clopidogrel Tablet 75 milliGRAM(s) Oral daily  dextrose 5%. 1000 milliLiter(s) IV Continuous <Continuous>  dextrose 5%. 1000 milliLiter(s) IV Continuous <Continuous>  dextrose 50% Injectable 25 Gram(s) IV Push once  dextrose 50% Injectable 12.5 Gram(s) IV Push once  dextrose 50% Injectable 25 Gram(s) IV Push once  dextrose Oral Gel 15 Gram(s) Oral once PRN  glucagon  Injectable 1 milliGRAM(s) IntraMuscular once  insulin glargine Injectable (LANTUS) 20 Unit(s) SubCutaneous every morning  insulin lispro (ADMELOG) corrective regimen sliding scale   SubCutaneous three times a day before meals  lacosamide 100 milliGRAM(s) Oral two times a day  levETIRAcetam 500 milliGRAM(s) Oral two times a day  lisinopril 40 milliGRAM(s) Oral daily  NIFEdipine XL 60 milliGRAM(s) Oral daily      Vital Signs Last 24 Hrs  T(C): 36.7 (07 Mar 2024 07:49), Max: 36.7 (07 Mar 2024 07:49)  T(F): 98 (07 Mar 2024 07:49), Max: 98 (07 Mar 2024 07:49)  HR: 62 (07 Mar 2024 07:49) (62 - 68)  BP: 131/66 (07 Mar 2024 07:49) (125/58 - 144/64)  BP(mean): --  RR: 18 (07 Mar 2024 07:49) (16 - 18)  SpO2: 99% (07 Mar 2024 07:49) (99% - 99%)    Parameters below as of 07 Mar 2024 07:49  Patient On (Oxygen Delivery Method): room air    Neurological Examination:  General:  Appearance is consistent with chronologic age.   Cognitive/Language:  Awake, alert, and oriented to person, place, time and date.  Recent and remote memory intact.  Fund of knowledge is appropriate.  Naming, repetition and comprehension intact. Nondysarthric.    Cranial Nerves  - Eyes: Visual fields full.  EOMI w/o nystagmus, skew or reported double vision.  PERRL.  No ptosis/weakness of eyelid closure.    - Face:  Facial sensation normal V1 - 3, no facial asymmetry.    - Ears/Nose/Throat:  Hearing grossly intact b/l to finger rub.  Palate elevates midline.  Tongue and uvula midline.   Motor exam: Normal tone and bulk. No tenderness, twitching, tremors or involuntary movements. R UE drift does not hit the bed, R LE drift does not hit the bed  Sensory examination:  Intact to light touch and pinprick, pain, temperature and proprioception and vibration in all extremities.  Reflexes: 2+ b/l biceps, triceps, patella and achilles.  Plantar response downgoing on the L. No toes on R.  Harman, clonus absent.  Cerebellum: FTN R UE dysmetria L UE normal, HTS intact.     NIHSS: 3    Labs:  CBC Full  -  ( 07 Mar 2024 08:14 )  WBC Count : 5.47 K/uL  RBC Count : 4.32 M/uL  Hemoglobin : 13.1 g/dL  Hematocrit : 39.3 %  Platelet Count - Automated : 144 K/uL  Mean Cell Volume : 91.0 fL  Mean Cell Hemoglobin : 30.3 pg  Mean Cell Hemoglobin Concentration : 33.3 g/dL  Auto Neutrophil # : x  Auto Lymphocyte # : x  Auto Monocyte # : x  Auto Eosinophil # : x  Auto Basophil # : x  Auto Neutrophil % : x  Auto Lymphocyte % : x  Auto Monocyte % : x  Auto Eosinophil % : x  Auto Basophil % : x    03-07    146  |  108  |  17  ----------------------------<  63<L>  4.1   |  22  |  1.0    Ca    9.2      07 Mar 2024 08:14    TPro  6.1  /  Alb  3.9  /  TBili  0.6  /  DBili  x   /  AST  18  /  ALT  14  /  AlkPhos  81  03-07    LIVER FUNCTIONS - ( 07 Mar 2024 08:14 )  Alb: 3.9 g/dL / Pro: 6.1 g/dL / ALK PHOS: 81 U/L / ALT: 14 U/L / AST: 18 U/L / GGT: x           PT/INR - ( 05 Mar 2024 20:23 )   PT: 10.40 sec;   INR: 0.91 ratio         PTT - ( 05 Mar 2024 20:23 )  PTT:23.2 sec  Urinalysis Basic - ( 07 Mar 2024 08:14 )    Color: x / Appearance: x / SG: x / pH: x  Gluc: 63 mg/dL / Ketone: x  / Bili: x / Urobili: x   Blood: x / Protein: x / Nitrite: x   Leuk Esterase: x / RBC: x / WBC x   Sq Epi: x / Non Sq Epi: x / Bacteria: x        Assessment:  This is a 74y Male w/ h/o     Plan:

## 2024-03-07 NOTE — PROGRESS NOTE ADULT - ASSESSMENT
74M w/ PMH of HTN, HLD, DMII (on insulin), L occipital punctate infarct (on asa 81 mg qd), seizures (on keppra 500 mg BID and lacosamide 100 mg qd) also on eliquis 5 mg qd pt unsure why (from prior chart review pt was supposed to be on full AC for 3 months after R UE vein thrombosis in August 2023 possible that med was never discontinued) who presents with cc of R LE weakness x 3 days with CTH showed L thalamic hypodensity concern for possible thalamic infarct.     Plan  # R LE weakness possibly 2/2 L thalamic infarct  - MR Brain non-con  - continue asa 81 mg qd  - Start Plavix 70mg po qd  - Stop Eliquis 5mg po qd  - Duplex US - negative  - EP consult to interrogate loop recorder - no events  - q8 neurochecks  - . A1c 9.2. TSH:   - PT/OT    #Seizures, chronic  - continue home keppra 500 mg BID and lacosamide 100 mg BID    #HTN, chronic  - SBP goal 120-160  - continue lisinopril 40 mg qd  - start nifedipine 60 mg qd, pt was previously on it and blood pressure is uncontrolled    #DM, chronic  - ISS   - glucose accuchecks    #HLD, chronic  - f/u lipid panel   - continue home atorvastatin 20 mg qd    Diet: DASH, consistent carb  DVT ppx: lovenox
74M w/ PMH of HTN, HLD, DMII (on insulin), L occipital punctate infarct (on asa 81 mg qd), seizures (on keppra 500 mg BID and lacosamide 100 mg qd) also on eliquis 5 mg qd pt unsure why (from prior chart review pt was supposed to be on full AC for 3 months after R UE vein thrombosis in August 2023 possible that med was never discontinued) who presents with cc of R LE weakness x 3 days with CTH showed L thalamic hypodensity concern for possible thalamic infarct.     Plan  # R LE weakness possibly 2/2 L thalamic infarct  - MR Brain non-con  - continue asa 81 mg qd  - Start Plavix 70mg po qd  - Stop Eliquis 5mg po qd  - Duplex US  - EP consult to interrogate loop recorder - no events  - q8 neurochecks  - f/u TSH, lipid panel, a1c  - PT/OT    #Seizures, chronic  - continue home keppra 500 mg BID and lacosamide 100 mg BID    #HTN, chronic  - SBP goal 120-160  - continue lisinopril 40 mg qd  - start nifedipine 60 mg qd, pt was previously on it and blood pressure is uncontrolled    #DM, chronic  - ISS   - glucose accuchecks    #HLD, chronic  - f/u lipid panel   - continue home atorvastatin 20 mg qd    Diet: DASH, consistent carb  DVT ppx: lovenox

## 2024-03-07 NOTE — OCCUPATIONAL THERAPY INITIAL EVALUATION ADULT - LIVES WITH, PROFILE
Pt lives with Ex wife in pvt home with ramp to enter, pt stays on 1st floor +Bath tub/other relative

## 2024-03-08 ENCOUNTER — TRANSCRIPTION ENCOUNTER (OUTPATIENT)
Age: 74
End: 2024-03-08

## 2024-03-08 VITALS
DIASTOLIC BLOOD PRESSURE: 66 MMHG | RESPIRATION RATE: 18 BRPM | TEMPERATURE: 97 F | OXYGEN SATURATION: 100 % | SYSTOLIC BLOOD PRESSURE: 133 MMHG | HEART RATE: 70 BPM

## 2024-03-08 LAB
ALBUMIN SERPL ELPH-MCNC: 4.1 G/DL — SIGNIFICANT CHANGE UP (ref 3.5–5.2)
ALP SERPL-CCNC: 91 U/L — SIGNIFICANT CHANGE UP (ref 30–115)
ALT FLD-CCNC: 21 U/L — SIGNIFICANT CHANGE UP (ref 0–41)
ANION GAP SERPL CALC-SCNC: 11 MMOL/L — SIGNIFICANT CHANGE UP (ref 7–14)
AST SERPL-CCNC: 29 U/L — SIGNIFICANT CHANGE UP (ref 0–41)
BILIRUB SERPL-MCNC: 0.6 MG/DL — SIGNIFICANT CHANGE UP (ref 0.2–1.2)
BUN SERPL-MCNC: 32 MG/DL — HIGH (ref 10–20)
CALCIUM SERPL-MCNC: 9.1 MG/DL — SIGNIFICANT CHANGE UP (ref 8.4–10.5)
CHLORIDE SERPL-SCNC: 103 MMOL/L — SIGNIFICANT CHANGE UP (ref 98–110)
CO2 SERPL-SCNC: 24 MMOL/L — SIGNIFICANT CHANGE UP (ref 17–32)
CREAT SERPL-MCNC: 1.2 MG/DL — SIGNIFICANT CHANGE UP (ref 0.7–1.5)
EGFR: 63 ML/MIN/1.73M2 — SIGNIFICANT CHANGE UP
GLUCOSE SERPL-MCNC: 104 MG/DL — HIGH (ref 70–99)
HCT VFR BLD CALC: 41.2 % — LOW (ref 42–52)
HGB BLD-MCNC: 13.4 G/DL — LOW (ref 14–18)
MCHC RBC-ENTMCNC: 29.9 PG — SIGNIFICANT CHANGE UP (ref 27–31)
MCHC RBC-ENTMCNC: 32.5 G/DL — SIGNIFICANT CHANGE UP (ref 32–37)
MCV RBC AUTO: 92 FL — SIGNIFICANT CHANGE UP (ref 80–94)
NRBC # BLD: 0 /100 WBCS — SIGNIFICANT CHANGE UP (ref 0–0)
PLATELET # BLD AUTO: 176 K/UL — SIGNIFICANT CHANGE UP (ref 130–400)
PMV BLD: 11.8 FL — HIGH (ref 7.4–10.4)
POTASSIUM SERPL-MCNC: 5.1 MMOL/L — HIGH (ref 3.5–5)
POTASSIUM SERPL-SCNC: 5.1 MMOL/L — HIGH (ref 3.5–5)
PROT SERPL-MCNC: 6.5 G/DL — SIGNIFICANT CHANGE UP (ref 6–8)
RBC # BLD: 4.48 M/UL — LOW (ref 4.7–6.1)
RBC # FLD: 12.8 % — SIGNIFICANT CHANGE UP (ref 11.5–14.5)
SODIUM SERPL-SCNC: 138 MMOL/L — SIGNIFICANT CHANGE UP (ref 135–146)
WBC # BLD: 6.35 K/UL — SIGNIFICANT CHANGE UP (ref 4.8–10.8)
WBC # FLD AUTO: 6.35 K/UL — SIGNIFICANT CHANGE UP (ref 4.8–10.8)

## 2024-03-08 PROCEDURE — 99222 1ST HOSP IP/OBS MODERATE 55: CPT

## 2024-03-08 PROCEDURE — 99239 HOSP IP/OBS DSCHRG MGMT >30: CPT

## 2024-03-08 RX ORDER — ATORVASTATIN CALCIUM 80 MG/1
1 TABLET, FILM COATED ORAL
Qty: 90 | Refills: 0
Start: 2024-03-08 | End: 2024-06-05

## 2024-03-08 RX ORDER — ATORVASTATIN CALCIUM 80 MG/1
1 TABLET, FILM COATED ORAL
Refills: 0 | DISCHARGE

## 2024-03-08 RX ORDER — NIFEDIPINE 30 MG
1 TABLET, EXTENDED RELEASE 24 HR ORAL
Qty: 90 | Refills: 0
Start: 2024-03-08 | End: 2024-06-05

## 2024-03-08 RX ORDER — APIXABAN 2.5 MG/1
1 TABLET, FILM COATED ORAL
Refills: 0 | DISCHARGE

## 2024-03-08 RX ORDER — ASPIRIN/CALCIUM CARB/MAGNESIUM 324 MG
1 TABLET ORAL
Qty: 90 | Refills: 0
Start: 2024-03-08 | End: 2024-06-05

## 2024-03-08 RX ORDER — CLOPIDOGREL BISULFATE 75 MG/1
1 TABLET, FILM COATED ORAL
Qty: 90 | Refills: 0
Start: 2024-03-08 | End: 2024-06-05

## 2024-03-08 RX ADMIN — CLOPIDOGREL BISULFATE 75 MILLIGRAM(S): 75 TABLET, FILM COATED ORAL at 12:02

## 2024-03-08 RX ADMIN — LACOSAMIDE 100 MILLIGRAM(S): 50 TABLET ORAL at 05:29

## 2024-03-08 RX ADMIN — Medication 2: at 11:59

## 2024-03-08 RX ADMIN — LEVETIRACETAM 500 MILLIGRAM(S): 250 TABLET, FILM COATED ORAL at 05:28

## 2024-03-08 RX ADMIN — Medication 81 MILLIGRAM(S): at 12:02

## 2024-03-08 RX ADMIN — LISINOPRIL 40 MILLIGRAM(S): 2.5 TABLET ORAL at 05:28

## 2024-03-08 RX ADMIN — Medication 60 MILLIGRAM(S): at 05:28

## 2024-03-08 RX ADMIN — INSULIN GLARGINE 20 UNIT(S): 100 INJECTION, SOLUTION SUBCUTANEOUS at 09:46

## 2024-03-08 NOTE — CONSULT NOTE ADULT - ASSESSMENT
74M w/ PMH of HTN, HLD, DMII (on insulin), L occipital punctate infarct (on asa 81 mg qd), seizures (on keppra 500 mg BID and lacosamide 100 mg qd) also on eliquis 5 mg qd pt unsure why (from prior chart review pt was supposed to be on full AC for 3 months after R UE vein thrombosis in August 2023 possible that med was never discontinued) who presents with cc of R LE weakness x 3 days with CTH showed L thalamic hypodensity concern for possible thalamic infarct.     # R LE weakness due to acute L thalamic infarct  - MR Brain non-con noted  - now on asa 81 mg qd and Plavix 70mg po qd  - off  Eliquis 5mg po qd  - Duplex US - negative  - reviewed outpt PMD notes and PMD was prescribing Eliquis BID for h/o stroke  - EP consult to interrogate loop recorder - no events  - q8 neurochecks  - . A1c 9.2. TSH:   - PT/OT    #Seizures, chronic  - continue home keppra 500 mg BID and lacosamide 100 mg BID    #HTN, chronic  - SBP goal 120-160  - continue lisinopril 40 mg qd  -  nifedipine 60 mg qd added  - pt needs to monitor BP at home    #Uncontrolled DM w/ hyperglycemia  - needs to monitor FS and higher dose of Tresiba  - close outpt PMD f/u    #HLD, chronic  -   - atorvastatin 80 mg qd  - needs better compliance    Diet: DASH, consistent carb  DVT ppx: lovenox  Chart and notes personally reviewed.  Care Discussed with Consultants/Other Providers/ Housestaff [ x] YES [ ] NO   Radiology, labs, old records personally reviewed.    discussed w/ housestaff, nursing, case management, neuro team    Time-based billing (NON-critical care).     55 minutes spent on total encounter. The necessity of the time spent during the encounter on this date of service was due to:     time spent on review of labs, imaging studies, old records, obtaining history, personally examining patient, counselling and communicating with patient/ family, entering orders for medications/tests/etc, discussions with other health care providers, documentation in electronic health records, independent interpretation of labs, imaging/procedure results and care coordination.    
IMPRESSION: Rehab of r/o stroke / RLE paresis / HTN, HLD, DMII (on insulin), L occipital punctate infarct (on asa 81 mg qd), seizures    PRECAUTIONS: [   ] Cardiac  [   ] Respiratory  [   ] Seizures [   ] Contact Isolation  [   ] Droplet Isolation  [   ] Other    Weight Bearing Status:     RECOMMENDATION:    Out of Bed to Chair     DVT/Decubiti Prophylaxis    REHAB PLAN:     [  x  ] Bedside P/T 3-5 times a week   [ x   ]   Bedside O/T  2-3 times a week             [    ] Speech Therapy               [    ]  No Rehab Therapy Indicated   Conditioning/ROM                                    ADL  Bed Mobility                                               Conditioning/ROM  Transfers                                                     Bed Mobility  Sitting /Standing Balance                         Transfers                                        Gait Training                                               Sitting/Standing Balance  Stair Training [   ]Applicable                    Home equipment Eval                                                                        Splinting  [   ] Only      GOALS:   ADL   [  x  ]   Independent                    Transfers  [ x   ] Independent                          Ambulation  [  x  ] Independent     [ x    ] With device                            [    ]  CG                                                         [    ]  CG                                                                  [    ] CG                            [    ] Min A                                                   [    ] Min A                                                              [    ] Min  A          DISCHARGE PLAN:   [    ]  Good candidate for Intensive Rehabilitation/Hospital based                                             Will tolerate 3hrs Intensive Rehab Daily                                       [     ]  Short Term Rehab in Skilled Nursing Facility                                       [     ]  Home with Outpatient or VN services                                         [   x  ]  Possible Candidate for Intensive Hospital based Rehab

## 2024-03-08 NOTE — DISCHARGE NOTE NURSING/CASE MANAGEMENT/SOCIAL WORK - PATIENT PORTAL LINK FT
You can access the FollowMyHealth Patient Portal offered by [facility] by registering at the following website: [email or web address]. By joining [facility]’s FollowMyHealth portal, you will also be able to view your health information using other applications (apps) compatible with our system.

## 2024-03-08 NOTE — PHARMACOTHERAPY INTERVENTION NOTE - COMMENTS
JOSÉ MANUEL PORTER is a 74yMale with a PMH of HTN, HLD, DMII, L occipital punctate infact, seizures.  The patient is currently being treated for L thalamic stroke.      Education occurred with patient    Educated on aspirin, clopidogrel, atorvastatin, lisinopril, nifedipine including indication, benefits of use, proper administration and possible side effects.  Medication education cards provided.      Patient was able to verbalize understanding and are able to teach back information.

## 2024-03-08 NOTE — DISCHARGE NOTE PROVIDER - NSDCFUSCHEDAPPT_GEN_ALL_CORE_FT
Dimas Walker  [facility] PreAdmits  Scheduled Appointment: 03/13/2024    [facility] Physician Count includes the [facility]  ENDOCRIN [address]  Scheduled Appointment: 03/13/2024    [facility]  CARDIOLOGY [address]  Scheduled Appointment: 04/09/2024    Royal Bhatia  [facility] PreAdmits  Scheduled Appointment: 05/06/2024    [facility]  PODIATRY  [address]  Scheduled Appointment: 05/06/2024    Ely Fernandez  [facility]  ELECTROPH [address]  Scheduled Appointment: 05/22/2024

## 2024-03-08 NOTE — DISCHARGE NOTE PROVIDER - CARE PROVIDER_API CALL
Adan Domingo  Neurology  [address] [place] [id number]  Phone: [phone]  Fax: [phone]  Follow Up Time: 2 weeks

## 2024-03-08 NOTE — DISCHARGE NOTE PROVIDER - EXTENDED VTE YES NO FOR MLM ENOXAPARIN
Quality 131: Pain Assessment And Follow-Up: Pain assessment using a standardized tool is documented as negative, no follow-up plan required Detail Level: Detailed Quality 130: Documentation Of Current Medications In The Medical Record: Current Medications Documented Additional Notes: Pain 0/10 ,

## 2024-03-08 NOTE — DISCHARGE NOTE NURSING/CASE MANAGEMENT/SOCIAL WORK - NSDCPEFALRISK_GEN_ALL_CORE
For information on Fall & Injury Prevention, visit: [email or web address] OR  [email or web address] OR  https://www.cdc.gov/steadi/patient.html

## 2024-03-08 NOTE — DISCHARGE NOTE PROVIDER - NSDCMRMEDTOKEN_GEN_ALL_CORE_FT
aspirin 81 mg oral tablet, chewable: 1 tab(s) orally once a day; take with food  atorvastatin 20 mg oral tablet: 1 tab(s) orally once a day (at bedtime)  benazepril 40 mg oral tablet: 1 tab(s) orally once a day  Eliquis 5 mg oral tablet: 1 tab(s) orally once a day  Janumet 50 mg-1000 mg oral tablet: 1 tab(s) orally 2 times a day  lacosamide 100 mg oral tablet: 1 tab(s) orally every 12 hours MDD:200 mg  levETIRAcetam 500 mg oral tablet: 1 tab(s) orally 2 times a day  omeprazole 20 mg oral delayed release capsule: 1 cap(s) orally once a day in the morning, take 30 minutes before breakfast  pioglitazone 15 mg oral tablet: 1 tab(s) orally once a day  Tresiba FlexTouch 100 units/mL subcutaneous solution: 40 unit(s) subcutaneous once a day   aspirin 81 mg oral delayed release tablet: 1 tab(s) orally once a day  benazepril 40 mg oral tablet: 1 tab(s) orally once a day  clopidogrel 75 mg oral tablet: 1 tab(s) orally once a day  Janumet 50 mg-1000 mg oral tablet: 1 tab(s) orally 2 times a day  lacosamide 100 mg oral tablet: 1 tab(s) orally every 12 hours MDD:200 mg  levETIRAcetam 500 mg oral tablet: 1 tab(s) orally 2 times a day  Lipitor 80 mg oral tablet: 1 tab(s) orally once a day (at bedtime)  NIFEdipine 60 mg oral tablet, extended release: 1 tab(s) orally once a day  omeprazole 20 mg oral delayed release capsule: 1 cap(s) orally once a day in the morning, take 30 minutes before breakfast  pioglitazone 15 mg oral tablet: 1 tab(s) orally once a day  Tresiba FlexTouch 100 units/mL subcutaneous solution: 40 unit(s) subcutaneous once a day

## 2024-03-08 NOTE — DISCHARGE NOTE PROVIDER - HOSPITAL COURSE
74M w/ PMH of HTN, HLD, DMII (on insulin), L occipital punctate infarct (on asa 81 mg qd), seizures (on keppra 500 mg BID and lacosamide 100 mg qd) also on eliquis 5 mg qd pt unsure why (from prior chart review pt was supposed to be on full AC for 3 months after R UE vein thrombosis in August 2023 possible that med was never discontinued) who presents with cc of R LE weakness x 3 days with CTH showed L thalamic hypodensity concern for possible thalamic infarct. MRI confirmed subacute thalamic infarct. ILR was interrogated, no events.     Impression:  Subacute L thalamic stroke, likely small vessel disease i/s/o poorly controlled risk factors though ESUS should be considered.    Plan  #Subacute L thalamic infarct  - continue asa 81 mg qd  - Start Plavix 70mg po qd  - Stop Eliquis 5mg po qd  - Duplex US - negative  - EP consult to interrogate loop recorder - no events  - . A1c 9.2. TSH:     #Seizures, chronic  - continue home keppra 500 mg BID and lacosamide 100 mg BID    #HTN, chronic  - SBP goal 120-160  - continue lisinopril 40 mg qd  - start nifedipine 60 mg qd    #DM, chronic  - Endo followup  - Continue home Lantus    #HLD, chronic  - Continue high dose statin

## 2024-03-08 NOTE — DISCHARGE NOTE PROVIDER - NSFOLLOWUPCLINICS_GEN_ALL_ED_FT
[facility] Pulmonolgy and Sleep Medicine  Pulmonology  [address] [place] [address], [place] [address]  Phone: [phone]  Fax:   Follow Up Time: 1 month

## 2024-03-08 NOTE — CONSULT NOTE ADULT - SUBJECTIVE AND OBJECTIVE BOX
JOSÉ MANUEL PORTER  74y  Male    Patient is a 74y old  Male who presents with a chief complaint of R LE weakness (08 Mar 2024 09:20)      HPI:  NEUROLOGY CONSULT    HPI: 74M w/ PMH of HTN, HLD, DMII (on insulin), L occipital punctate infarct (on asa 81 mg qd), seizures (on keppra 500 mg BID and lacosamide 100 mg qd) also on eliquis 5 mg qd pt unsure why (from prior chart review pt was supposed to be on full AC for 3 months after R UE vein thrombosis in August 2023 possible that med was never discontinued) who presents with cc of R LE weakness that started when he woke up in the morning on Sunday to the point where he had difficulty walking but denies any numbness. He denies any blurry vision, diplopia, numbness, dizziness, headache. He reports compliance with medications.         FAMILY HISTORY:  Family history of lung cancer (Mother)    Family history of ischemic heart disease (IHD) (Father)      SOCIAL HISTORY: negative for tobacco, alcohol, or ilicit drug use.    Allergies    No Known Allergies    Intolerances      S: Patient was examined and seen at bedside. This morning pt is resting comfortably in bed and reports no new issues or overnight events. No complaints, feels better  Denies CP, SOB, N/V/D/C/AP, cough, F, chills, dizziness, new focal weakness, HA, vision changes, dysuria, or urinary symptoms, blood in stool.  ROS: all other systems reviewed and are negative    PAST MEDICAL & SURGICAL HISTORY:  DM (diabetes mellitus)  Type 2, 12/27/18 hgb aic  11.2      HTN (hypertension)      Dyslipidemia      Diabetic foot ulcer      Depression      GERD (gastroesophageal reflux disease)      Neuropathy, diabetic      Toe amputation status, right  (2008)      History of amputation of toe  LT -partial 1st toe        SOCIAL HISTORY:  Tobacco: negative  Illicit drugs: negative  Alcohol: social  Family history reviewed and otherwise non-contributory No clotting disorders, CVAs at early age.  ALLERGIES: NKDA    MEDICATIONS  (STANDING):  aspirin enteric coated 81 milliGRAM(s) Oral daily  atorvastatin 20 milliGRAM(s) Oral at bedtime  clopidogrel Tablet 75 milliGRAM(s) Oral daily  dextrose 5%. 1000 milliLiter(s) (100 mL/Hr) IV Continuous <Continuous>  dextrose 5%. 1000 milliLiter(s) (50 mL/Hr) IV Continuous <Continuous>  dextrose 50% Injectable 25 Gram(s) IV Push once  dextrose 50% Injectable 12.5 Gram(s) IV Push once  dextrose 50% Injectable 25 Gram(s) IV Push once  glucagon  Injectable 1 milliGRAM(s) IntraMuscular once  insulin glargine Injectable (LANTUS) 20 Unit(s) SubCutaneous every morning  insulin lispro (ADMELOG) corrective regimen sliding scale   SubCutaneous three times a day before meals  lacosamide 100 milliGRAM(s) Oral two times a day  levETIRAcetam 500 milliGRAM(s) Oral two times a day  lisinopril 40 milliGRAM(s) Oral daily  NIFEdipine XL 60 milliGRAM(s) Oral daily    MEDICATIONS  (PRN):  dextrose Oral Gel 15 Gram(s) Oral once PRN Blood Glucose LESS THAN 70 milliGRAM(s)/deciliter    Home Medications:  benazepril 40 mg oral tablet: 1 tab(s) orally once a day (05 Mar 2024 23:08)  Janumet 50 mg-1000 mg oral tablet: 1 tab(s) orally 2 times a day (05 Mar 2024 23:08)  levETIRAcetam 500 mg oral tablet: 1 tab(s) orally 2 times a day (05 Mar 2024 23:08)  pioglitazone 15 mg oral tablet: 1 tab(s) orally once a day (05 Mar 2024 23:08)  Tresiba FlexTouch 100 units/mL subcutaneous solution: 40 unit(s) subcutaneous once a day (05 Mar 2024 23:08)          Vital Signs Last 24 Hrs  T(C): 36.1 (08 Mar 2024 16:00), Max: 36.7 (07 Mar 2024 23:35)  T(F): 97 (08 Mar 2024 16:00), Max: 98 (07 Mar 2024 23:35)  HR: 70 (08 Mar 2024 16:00) (69 - 75)  BP: 133/66 (08 Mar 2024 16:00) (130/72 - 156/73)  BP(mean): 88 (08 Mar 2024 16:00) (88 - 111)  RR: 18 (08 Mar 2024 16:00) (16 - 19)  SpO2: 100% (08 Mar 2024 16:00) (98% - 100%)    Parameters below as of 08 Mar 2024 16:00  Patient On (Oxygen Delivery Method): room air      CAPILLARY BLOOD GLUCOSE      POCT Blood Glucose.: 192 mg/dL (08 Mar 2024 11:33)  POCT Blood Glucose.: 187 mg/dL (08 Mar 2024 09:44)  POCT Blood Glucose.: 94 mg/dL (08 Mar 2024 05:34)  POCT Blood Glucose.: 194 mg/dL (08 Mar 2024 02:12)  POCT Blood Glucose.: 402 mg/dL (07 Mar 2024 22:50)  POCT Blood Glucose.: 340 mg/dL (07 Mar 2024 21:14)  POCT Blood Glucose.: 231 mg/dL (07 Mar 2024 18:34)      General: NAD. Looks stated age.  HEENT: clean oropharynx, EOMI, no LAD  Neck: trachea midline, no thyromegaly  CV: nl S1 S2; no m/r/g  Resp: decreased breath sounds at base  GI: NT/ND/S +BS  MS: no clubbing/cyanosis/edema, +pulses  Neuro: Rt 5-/5, rest 5/5,  + reflexes  Skin: no rashes, nl turgor  Psychiatric: AA0x3 w/ fair insight and judgement    tele: SR, nonspecific changes (on my own evaluation of tele monitor)        LABS:                        13.4   6.35  )-----------( 176      ( 08 Mar 2024 06:02 )             41.2     03-08    138  |  103  |  32<H>  ----------------------------<  104<H>  5.1<H>   |  24  |  1.2    Ca    9.1      08 Mar 2024 06:02    TPro  6.5  /  Alb  4.1  /  TBili  0.6  /  DBili  x   /  AST  29  /  ALT  21  /  AlkPhos  91  03-08    LIVER FUNCTIONS - ( 08 Mar 2024 06:02 )  Alb: 4.1 g/dL / Pro: 6.5 g/dL / ALK PHOS: 91 U/L / ALT: 21 U/L / AST: 29 U/L / GGT: x                 Urinalysis Basic - ( 08 Mar 2024 06:02 )    Color: x / Appearance: x / SG: x / pH: x  Gluc: 104 mg/dL / Ketone: x  / Bili: x / Urobili: x   Blood: x / Protein: x / Nitrite: x   Leuk Esterase: x / RBC: x / WBC x   Sq Epi: x / Non Sq Epi: x / Bacteria: x            EKG - SR, nonspecific changes (my read)  Chart and consultant noted personally reviewed.  Care Discussed with Consultants/Other Providers/ Housestaff [ x] YES [ ] NO   Radiology, labs, old records personally reviewed.          
    HPI: 74M w/ PMH of HTN, HLD, DMII (on insulin), L occipital punctate infarct (on asa 81 mg qd), seizures (on keppra 500 mg BID and lacosamide 100 mg qd) also on eliquis 5 mg qd pt unsure why (from prior chart review pt was supposed to be on full AC for 3 months after R UE vein thrombosis in August 2023 possible that med was never discontinued) who presents with cc of R LE weakness that started when he woke up in the morning on Sunday to the point where he had difficulty walking but denies any numbness. He denies any blurry vision, diplopia, numbness, dizziness, headache. He reports compliance with medications.         < from: CT Head No Cont (03.05.24 @ 20:57) >  New focal patchy hypodensity in the left thalamus compared to the prior   CT head from 8/9/2023, likely representing an acute infarct. No   intracranial hemorrhage or midline shift.  R temporal parietl encephalomalacia chronic  Stable mild chronic microvascular ischemic changes.    < from: CT Angio Neck w/ IV Cont (03.05.24 @ 21:13) >  Patent bilateral PCA. Focal moderate stenosis of the proximal left P3   segment ().    No large vessel occlusion high-grade stenosis or aneurysm.    < end of copied text >        PAST MEDICAL & SURGICAL HISTORY:  DM (diabetes mellitus)  Type 2, 12/27/18 hgb aic  11.2      HTN (hypertension)      Dyslipidemia      Diabetic foot ulcer      Depression      GERD (gastroesophageal reflux disease)      Neuropathy, diabetic      Toe amputation status, right  (2008)      History of amputation of toe  LT -partial 1st toe          Hospital Course:    TODAY'S SUBJECTIVE & REVIEW OF SYMPTOMS:     Constitutional WNL   Cardio WNL   Resp WNL   GI WNL  Heme WNL  Endo WNL  Skin WNL  MSK WNL  Neuro RLE weakness   Cognitive WNL  Psych WNL      MEDICATIONS  (STANDING):  aspirin enteric coated 81 milliGRAM(s) Oral daily  atorvastatin 20 milliGRAM(s) Oral at bedtime  clopidogrel Tablet 75 milliGRAM(s) Oral daily  dextrose 5%. 1000 milliLiter(s) (50 mL/Hr) IV Continuous <Continuous>  dextrose 5%. 1000 milliLiter(s) (100 mL/Hr) IV Continuous <Continuous>  dextrose 50% Injectable 25 Gram(s) IV Push once  dextrose 50% Injectable 12.5 Gram(s) IV Push once  dextrose 50% Injectable 25 Gram(s) IV Push once  glucagon  Injectable 1 milliGRAM(s) IntraMuscular once  insulin glargine Injectable (LANTUS) 20 Unit(s) SubCutaneous every morning  insulin lispro (ADMELOG) corrective regimen sliding scale   SubCutaneous three times a day before meals  lacosamide 100 milliGRAM(s) Oral two times a day  levETIRAcetam 500 milliGRAM(s) Oral two times a day  lisinopril 40 milliGRAM(s) Oral daily  NIFEdipine XL 60 milliGRAM(s) Oral daily    MEDICATIONS  (PRN):  dextrose Oral Gel 15 Gram(s) Oral once PRN Blood Glucose LESS THAN 70 milliGRAM(s)/deciliter      FAMILY HISTORY:  Family history of lung cancer (Mother)    Family history of ischemic heart disease (IHD) (Father)        Allergies    No Known Allergies    Intolerances        SOCIAL HISTORY:    [  ] Etoh  [  ] Smoking  [  ] Substance abuse     Home Environment:  [   ] Home Alone  [  x ] Lives with Family  [   ] Home Health Aid    Dwelling:  [   ] Apartment  [x   ] Private House  [   ] Adult Home  [   ] Skilled Nursing Facility      [   ] Short Term  [   ] Long Term  [ x  ] Stairs       Elevator [   ]    FUNCTIONAL STATUS PTA: (Check all that apply)  Ambulation: [  x  ]Independent    [   ] Dependent     [   ] Non-Ambulatory  Assistive Device: [  x ] SA Cane  [   ]  Q Cane  [ x  ] Walker  [   ]  Wheelchair  ADL : [ x  ] Independent  [    ]  Dependent       Vital Signs Last 24 Hrs  T(C): 36.7 (06 Mar 2024 08:09), Max: 36.8 (06 Mar 2024 05:32)  T(F): 98 (06 Mar 2024 08:09), Max: 98.2 (06 Mar 2024 05:32)  HR: 64 (06 Mar 2024 08:09) (64 - 90)  BP: 132/82 (06 Mar 2024 08:09) (132/82 - 235/105)  BP(mean): --  RR: 18 (06 Mar 2024 08:09) (16 - 18)  SpO2: 99% (06 Mar 2024 08:09) (98% - 99%)    Parameters below as of 06 Mar 2024 08:09  Patient On (Oxygen Delivery Method): room air          PHYSICAL EXAM: Awake & Alert  GENERAL: NAD  HEAD:  Normocephalic  CHEST/LUNG: Clear   HEART: S1S2+  ABDOMEN: Soft, Nontender  EXTREMITIES:  no calf tenderness    NERVOUS SYSTEM:  Cranial Nerves 2-12 intact [   ] Abnormal  [   ]  ROM: WFL all extremities [ x  ]  Abnormal [   ]  Motor Strength: WFL all extremities  [   ]  Abnormal [  x ]5/5 batool UE and LLE, 4/5 RLE  Sensation: intact to light touch [ x  ] Abnormal [   ]    FUNCTIONAL STATUS:  Bed Mobility: Independent [   ]  Supervision [   ]  Needs Assistance [  x ]  N/A [   ]  Transfers: Independent [   ]  Supervision [   ]  Needs Assistance [ x  ]  N/A [   ]   Ambulation: Independent [   ]  Supervision [   ]  Needs Assistance [ x  ]  N/A [   ]  ADL: Independent [   ] Requires Assistance [   ] N/A [   ]      LABS:                        12.3   5.77  )-----------( 181      ( 06 Mar 2024 07:41 )             37.4     03-06    143  |  105  |  13  ----------------------------<  131<H>  3.7   |  29  |  0.8    Ca    9.1      06 Mar 2024 07:41    TPro  6.1  /  Alb  3.9  /  TBili  0.4  /  DBili  x   /  AST  14  /  ALT  15  /  AlkPhos  88  03-06    PT/INR - ( 05 Mar 2024 20:23 )   PT: 10.40 sec;   INR: 0.91 ratio         PTT - ( 05 Mar 2024 20:23 )  PTT:23.2 sec  Urinalysis Basic - ( 06 Mar 2024 07:41 )    Color: x / Appearance: x / SG: x / pH: x  Gluc: 131 mg/dL / Ketone: x  / Bili: x / Urobili: x   Blood: x / Protein: x / Nitrite: x   Leuk Esterase: x / RBC: x / WBC x   Sq Epi: x / Non Sq Epi: x / Bacteria: x        RADIOLOGY & ADDITIONAL STUDIES:

## 2024-03-13 ENCOUNTER — OUTPATIENT (OUTPATIENT)
Dept: OUTPATIENT SERVICES | Facility: HOSPITAL | Age: 74
LOS: 1 days | End: 2024-03-13
Payer: MEDICARE

## 2024-03-13 ENCOUNTER — APPOINTMENT (OUTPATIENT)
Dept: ENDOCRINOLOGY | Facility: CLINIC | Age: 74
End: 2024-03-13

## 2024-03-13 VITALS
WEIGHT: 198 LBS | BODY MASS INDEX: 27.72 KG/M2 | SYSTOLIC BLOOD PRESSURE: 157 MMHG | HEART RATE: 78 BPM | HEIGHT: 71 IN | DIASTOLIC BLOOD PRESSURE: 78 MMHG

## 2024-03-13 DIAGNOSIS — E11.9 TYPE 2 DIABETES MELLITUS W/OUT COMPLICATIONS: ICD-10-CM

## 2024-03-13 DIAGNOSIS — Z00.00 ENCOUNTER FOR GENERAL ADULT MEDICAL EXAMINATION WITHOUT ABNORMAL FINDINGS: ICD-10-CM

## 2024-03-13 DIAGNOSIS — Z89.429 ACQUIRED ABSENCE OF OTHER TOE(S), UNSPECIFIED SIDE: Chronic | ICD-10-CM

## 2024-03-13 DIAGNOSIS — E78.00 PURE HYPERCHOLESTEROLEMIA, UNSPECIFIED: ICD-10-CM

## 2024-03-13 DIAGNOSIS — Z89.421 ACQUIRED ABSENCE OF OTHER RIGHT TOE(S): Chronic | ICD-10-CM

## 2024-03-13 DIAGNOSIS — Z79.4 TYPE 2 DIABETES MELLITUS W/OUT COMPLICATIONS: ICD-10-CM

## 2024-03-13 PROCEDURE — 99214 OFFICE O/P EST MOD 30 MIN: CPT

## 2024-03-13 NOTE — HISTORY OF PRESENT ILLNESS
[FreeTextEntry1] : RECENT STROKE, UNCONTROLLED HYPERCHOLESTEROLEMIA, CONFUSION OVER MEDS., poor diabetes control.

## 2024-03-13 NOTE — PHYSICAL EXAM
[Alert] : alert [Well Nourished] : well nourished [No Acute Distress] : no acute distress [Well Developed] : well developed [Normal Sclera/Conjunctiva] : normal sclera/conjunctiva [EOMI] : extra ocular movement intact [No Proptosis] : no proptosis [Normal Oropharynx] : the oropharynx was normal [Thyroid Not Enlarged] : the thyroid was not enlarged [No Thyroid Nodules] : no palpable thyroid nodules [No Accessory Muscle Use] : no accessory muscle use [No Respiratory Distress] : no respiratory distress [Normal S1, S2] : normal S1 and S2 [Clear to Auscultation] : lungs were clear to auscultation bilaterally [Normal Rate] : heart rate was normal [Regular Rhythm] : with a regular rhythm [Pedal Pulses Normal] : the pedal pulses are present [No Edema] : no peripheral edema [Not Tender] : non-tender [Normal Bowel Sounds] : normal bowel sounds [Soft] : abdomen soft [Not Distended] : not distended [Normal Anterior Cervical Nodes] : no anterior cervical lymphadenopathy [Normal Posterior Cervical Nodes] : no posterior cervical lymphadenopathy [Spine Straight] : spine straight [No Spinal Tenderness] : no spinal tenderness [Normal Gait] : normal gait [No Stigmata of Cushings Syndrome] : no stigmata of Cushings Syndrome [Normal Strength/Tone] : muscle strength and tone were normal [No Rash] : no rash [Acanthosis Nigricans] : no acanthosis nigricans [Normal Reflexes] : deep tendon reflexes were 2+ and symmetric [No Tremors] : no tremors [Oriented x3] : oriented to person, place, and time

## 2024-03-14 DIAGNOSIS — E11.40 TYPE 2 DIABETES MELLITUS WITH DIABETIC NEUROPATHY, UNSPECIFIED: ICD-10-CM

## 2024-03-14 DIAGNOSIS — Z79.01 LONG TERM (CURRENT) USE OF ANTICOAGULANTS: ICD-10-CM

## 2024-03-14 DIAGNOSIS — I10 ESSENTIAL (PRIMARY) HYPERTENSION: ICD-10-CM

## 2024-03-14 DIAGNOSIS — E78.5 HYPERLIPIDEMIA, UNSPECIFIED: ICD-10-CM

## 2024-03-14 DIAGNOSIS — Z79.82 LONG TERM (CURRENT) USE OF ASPIRIN: ICD-10-CM

## 2024-03-14 DIAGNOSIS — Z79.4 LONG TERM (CURRENT) USE OF INSULIN: ICD-10-CM

## 2024-03-14 DIAGNOSIS — Z79.84 LONG TERM (CURRENT) USE OF ORAL HYPOGLYCEMIC DRUGS: ICD-10-CM

## 2024-03-14 DIAGNOSIS — Z89.422 ACQUIRED ABSENCE OF OTHER LEFT TOE(S): ICD-10-CM

## 2024-03-14 DIAGNOSIS — I69.398 OTHER SEQUELAE OF CEREBRAL INFARCTION: ICD-10-CM

## 2024-03-14 DIAGNOSIS — Z86.718 PERSONAL HISTORY OF OTHER VENOUS THROMBOSIS AND EMBOLISM: ICD-10-CM

## 2024-03-14 DIAGNOSIS — I63.81 OTHER CEREBRAL INFARCTION DUE TO OCCLUSION OR STENOSIS OF SMALL ARTERY: ICD-10-CM

## 2024-03-14 DIAGNOSIS — Z87.891 PERSONAL HISTORY OF NICOTINE DEPENDENCE: ICD-10-CM

## 2024-03-14 DIAGNOSIS — R29.703 NIHSS SCORE 3: ICD-10-CM

## 2024-03-14 DIAGNOSIS — R26.0 ATAXIC GAIT: ICD-10-CM

## 2024-03-14 DIAGNOSIS — G40.802 OTHER EPILEPSY, NOT INTRACTABLE, WITHOUT STATUS EPILEPTICUS: ICD-10-CM

## 2024-03-14 DIAGNOSIS — K21.9 GASTRO-ESOPHAGEAL REFLUX DISEASE WITHOUT ESOPHAGITIS: ICD-10-CM

## 2024-03-14 DIAGNOSIS — E11.65 TYPE 2 DIABETES MELLITUS WITH HYPERGLYCEMIA: ICD-10-CM

## 2024-03-14 DIAGNOSIS — G83.11 MONOPLEGIA OF LOWER LIMB AFFECTING RIGHT DOMINANT SIDE: ICD-10-CM

## 2024-03-14 DIAGNOSIS — Z89.421 ACQUIRED ABSENCE OF OTHER RIGHT TOE(S): ICD-10-CM

## 2024-03-26 NOTE — DISCHARGE NOTE NURSING/CASE MANAGEMENT/SOCIAL WORK - NURSING SECTION COMPLETE
Brief Postoperative Note      Patient: Dinorah Beltran  YOB: 1973  MRN: [id number]    Date of Procedure: 3/26/2024    Pre-Op Diagnosis Codes:     * Incisional hernia, without obstruction or gangrene [K43.2]    Post-Op Diagnosis: Same       Procedure(s):  ROBOTIC INCISIONAL HERNIA REPAIR WITH MESH AND RIGHT UPPER QUADRANT PRIMARY INCISONAL HERNIA REPAIR    Surgeon(s):  Robbie Peres MD    Assistant:  Surgical Assistant: Marisela Vasquez    Anesthesia: General    Estimated Blood Loss (mL): Minimal    Complications: None    Specimens:   * No specimens in log *    Implants:  Implant Name Type Inv. Item Serial No.  Lot No. LRB No. Used Action   MESH SURG [id number] ELLIPSE [employer] VENTRALIGHT - SNA  MESH SURG [id number] ELLIPSE [employer] VENTRALIGHT NA BARD DAVOL-WD [id number] N/A 1 Implanted         Drains: * No LDAs found *    Findings: 2 cm umbilical defect  3 cm shallow defect at semilunar line, right upper quadrant    Electronically signed by Robbie Peres MD on 3/26/2024 at 1:22 PM  
Patient/Caregiver provided printed discharge information.

## 2024-03-27 DIAGNOSIS — E78.00 PURE HYPERCHOLESTEROLEMIA, UNSPECIFIED: ICD-10-CM

## 2024-03-27 DIAGNOSIS — E11.65 TYPE 2 DIABETES MELLITUS WITH HYPERGLYCEMIA: ICD-10-CM

## 2024-04-03 RX ORDER — SITAGLIPTIN AND METFORMIN HYDROCHLORIDE 50; 1000 MG/1; MG/1
50-1000 TABLET, FILM COATED ORAL TWICE DAILY
Qty: 180 | Refills: 1 | Status: DISCONTINUED | COMMUNITY
Start: 2019-01-17 | End: 2024-04-03

## 2024-04-04 NOTE — ED ADULT NURSE NOTE - IN THE PAST 6 MONTHS, HAVE YOU HEARD GUNS BEING SHOT OR HAD SOMEONE PULL A GUN ON YOU?
DISCHARGE SUMMARY    Pt discharging to: home with family  Transportation: medical transport, [place].  AVS given and discussed: Pt was given AVS and pt states understanding of content. Pt has no further questions.   Stoplight Tool given and discussed: Yes, no further questions.  Medications given: Yes, discussed. No further questions.   Belongings returned: Yes, ensured all belongings packed and sent with pt. No items in security.   Comments: Escorted safely to elevators. Pt left at 1:54 PM          No

## 2024-04-08 ENCOUNTER — NON-APPOINTMENT (OUTPATIENT)
Age: 74
End: 2024-04-08

## 2024-04-09 ENCOUNTER — APPOINTMENT (OUTPATIENT)
Dept: CARDIOLOGY | Facility: CLINIC | Age: 74
End: 2024-04-09
Payer: MEDICARE

## 2024-04-09 PROCEDURE — 93298 REM INTERROG DEV EVAL SCRMS: CPT

## 2024-04-18 NOTE — DISCHARGE NOTE NURSING/CASE MANAGEMENT/SOCIAL WORK - NSDCPEFALRISK_GEN_ALL_CORE
For information on Fall & Injury Prevention, visit: [email or web address] OR  [email or web address] OR  https://www.cdc.gov/steadi/patient.html
Nutrition focused physical exam deferred at this time, no overt signs of depletion observed during interview.

## 2024-05-06 ENCOUNTER — APPOINTMENT (OUTPATIENT)
Dept: PODIATRY | Facility: CLINIC | Age: 74
End: 2024-05-06

## 2024-05-22 ENCOUNTER — APPOINTMENT (OUTPATIENT)
Dept: ELECTROPHYSIOLOGY | Facility: CLINIC | Age: 74
End: 2024-05-22
Payer: MEDICARE

## 2024-05-22 VITALS
BODY MASS INDEX: 29.12 KG/M2 | HEART RATE: 73 BPM | WEIGHT: 208 LBS | SYSTOLIC BLOOD PRESSURE: 104 MMHG | TEMPERATURE: 98 F | DIASTOLIC BLOOD PRESSURE: 64 MMHG | HEIGHT: 71 IN

## 2024-05-22 DIAGNOSIS — I63.9 CEREBRAL INFARCTION, UNSPECIFIED: ICD-10-CM

## 2024-05-22 DIAGNOSIS — I48.0 PAROXYSMAL ATRIAL FIBRILLATION: ICD-10-CM

## 2024-05-22 DIAGNOSIS — I10 ESSENTIAL (PRIMARY) HYPERTENSION: ICD-10-CM

## 2024-05-22 PROCEDURE — 99214 OFFICE O/P EST MOD 30 MIN: CPT

## 2024-05-22 RX ORDER — PIOGLITAZONE HYDROCHLORIDE 15 MG/1
15 TABLET ORAL
Qty: 90 | Refills: 3 | Status: ACTIVE | COMMUNITY
Start: 2022-12-21

## 2024-05-22 RX ORDER — OMEPRAZOLE 20 MG/1
20 TABLET, DELAYED RELEASE ORAL DAILY
Refills: 0 | Status: ACTIVE | COMMUNITY
Start: 2023-11-20

## 2024-05-22 RX ORDER — LEVETIRACETAM 500 MG/1
500 TABLET, FILM COATED ORAL TWICE DAILY
Refills: 0 | Status: ACTIVE | COMMUNITY
Start: 2021-10-25

## 2024-05-22 RX ORDER — BENAZEPRIL HYDROCHLORIDE 40 MG/1
40 TABLET ORAL DAILY
Refills: 0 | Status: ACTIVE | COMMUNITY
Start: 2023-11-20

## 2024-05-22 RX ORDER — ATORVASTATIN CALCIUM 20 MG/1
20 TABLET, FILM COATED ORAL
Qty: 90 | Refills: 2 | Status: ACTIVE | COMMUNITY

## 2024-05-22 RX ORDER — BLOOD-GLUCOSE SENSOR
EACH MISCELLANEOUS
Qty: 6 | Refills: 3 | Status: ACTIVE | COMMUNITY
Start: 2023-09-21

## 2024-05-22 RX ORDER — APIXABAN 5 MG/1
5 TABLET, FILM COATED ORAL
Qty: 60 | Refills: 5 | Status: ACTIVE | COMMUNITY
Start: 2023-11-20

## 2024-05-22 RX ORDER — BLOOD-GLUCOSE,RECEIVER,CONT
EACH MISCELLANEOUS
Qty: 1 | Refills: 0 | Status: ACTIVE | COMMUNITY
Start: 2023-09-21

## 2024-05-22 RX ORDER — DULAGLUTIDE 3 MG/.5ML
3 INJECTION, SOLUTION SUBCUTANEOUS
Qty: 1 | Refills: 3 | Status: ACTIVE | COMMUNITY
Start: 2024-03-13

## 2024-05-22 RX ORDER — CICLOPIROX OLAMINE 7.7 MG/G
0.77 CREAM TOPICAL TWICE DAILY
Qty: 1 | Refills: 1 | Status: ACTIVE | COMMUNITY
Start: 2022-05-23

## 2024-05-22 RX ORDER — METFORMIN HYDROCHLORIDE 1000 MG/1
1000 TABLET, COATED ORAL
Qty: 180 | Refills: 3 | Status: ACTIVE | COMMUNITY
Start: 2024-04-03

## 2024-05-22 RX ORDER — INSULIN DEGLUDEC INJECTION 200 U/ML
200 INJECTION, SOLUTION SUBCUTANEOUS DAILY
Qty: 3 | Refills: 2 | Status: ACTIVE | COMMUNITY
Start: 2023-09-26

## 2024-05-22 RX ORDER — KRILL/OM-3/DHA/EPA/PHOSPHO/AST 1000-230MG
81 CAPSULE ORAL DAILY
Refills: 0 | Status: ACTIVE | COMMUNITY
Start: 2023-11-20

## 2024-05-22 RX ORDER — EZETIMIBE 10 MG/1
10 TABLET ORAL
Qty: 90 | Refills: 2 | Status: ACTIVE | COMMUNITY
Start: 2024-03-13

## 2024-05-22 NOTE — CARDIOLOGY SUMMARY
[de-identified] : 4/18/22: SR 69 bpm, non-spec T wave abnormality\par  ECG (10/11/2021): Sinus rhythm at 86 bpm [de-identified] : 2D ECHO (9/7/2021): EF normal, mildly enlarged LA, normal RA

## 2024-05-22 NOTE — ASU DISCHARGE PLAN (ADULT/PEDIATRIC) - CARE PROVIDER_API CALL
BMT patient discharged 2 days ago. Today having chills, hot flashes and short of breath.     Triage Assessment (Adult)       Row Name 05/22/24 8771          Triage Assessment    Airway WDL WDL        Respiratory WDL    Respiratory WDL X        Skin Circulation/Temperature WDL    Skin Circulation/Temperature WDL WDL        Cardiac WDL    Cardiac WDL WDL        Peripheral/Neurovascular WDL    Peripheral Neurovascular WDL WDL        Cognitive/Neuro/Behavioral WDL    Cognitive/Neuro/Behavioral WDL WDL                      Jabari Espinosa  SURGERY  [address] [place] [address], [place] [address]  Phone: [phone]  Fax: [phone]  Follow Up Time:     Royal Bhatia  PODIATRIC MEDICINE  [address] [place] [address], [facility] [place] [address] [place] [address]  Phone: [phone]  Fax: [phone]  Follow Up Time:

## 2024-05-22 NOTE — HISTORY OF PRESENT ILLNESS
[Dizziness] : dizziness [Palpitations] : no palpitations [SOB] : no dyspnea [Syncope] : no syncope [Chest Pain] : no chest pain or discomfort [de-identified] : PCP: Dr. Shelton Cardiologist: Dr. Albarado  The patient underwent placement of ILR on 9/8/2021 for cryptogenic CVA.  He was recently hospitalized in [facility] for seizures two weeks ago. He was intubated. He is doing PT now at home. His ex-wife is his caretaker.  They have a lot of questions about what went on in [facility] and some new  medications were supposed to have been started that were not sent to the pharmacy.

## 2024-05-22 NOTE — DISCUSSION/SUMMARY
[FreeTextEntry1] : Mr. Tariq Ceja is a pleasant 74-year-old man with hypertension, hyperlipidemia, diabetes mellitus, depression, GERD, admitted to [facility] in September 2021 for cryptogenic stroke. I recommend an ILR implant for this patient for long term detection of arrhythmias as a cause of his symptoms. Patient underwent implant of ILR on 9/8/2021.  His wound is healed well. There are no signs and symptoms of inflammation. I interrogated his device as described above. Patient is enrolled in remote monitoring services. He is transmitting.  Cha2 DS2 Vasc score of at least 6 (age1, stroke2, HTN, DM)  Patient is currently on Eliquis 5mg BID for RUE DVT. I recommend that patient continue Eliquis 5mg BID indefinitely for CVA prevention due to findings of subclinical AF on ILR. No s/s bleeding reported.   NCT on AF event - could be due to abberancy but cannot rule out NSVT. I recommend echo and stress test if none recent. He was seeing Dr. Albarado but thought he did not have to anymore. We set up an appointment at [facility] but the patient never went. His exwife is ill herself and he cannot bring himself to appointments. I recommend f/u with cardio.  Patient also has no neuro follow up and is unsure what was prescribed by [facility]. Seen by neuro here at 1110 in 2021. They only want to follow with [facility] doctors. I recommended that patient go and make an appointment with them before he leaves today. They say they will.  Also, patient needs PCP f/u, Dr. Shelton to evaluate if patient qualifies for assistance at home. Right now, he is getting PT at home.   His BP is well controlled. Continue lotensin 40mg QD.  The patient will return to our office in 6-9 months. If you have any questions please contact our office at [phone].

## 2024-05-22 NOTE — REASON FOR VISIT
[___ Device Check] : is here today for a [unfilled] device check for [Other ___] : [unfilled] [Other: _____] : [unfilled] [Family Member] : family member

## 2024-05-22 NOTE — PROCEDURE
[See Device Printout] : See device printout [de-identified] : GLORIA [de-identified] : [id number] [de-identified] : [id number] [de-identified] : 9/8/2021 [de-identified] : Good [de-identified] : No events

## 2024-05-24 NOTE — ED PROVIDER NOTE - ATTENDING SHARED VISIT SELECTORS
Spoke with granddaughter, Aixa. Pt has been found to have a moderate sized hiatal hernia and GERD. She is currently admitted to [facility] with plan for EGD later today. Advised Aixa that we will call and touch base next week.  
History/Exam/Medical Decision Making

## 2024-06-26 ENCOUNTER — NON-APPOINTMENT (OUTPATIENT)
Age: 74
End: 2024-06-26

## 2024-06-26 ENCOUNTER — APPOINTMENT (OUTPATIENT)
Dept: CARDIOLOGY | Facility: CLINIC | Age: 74
End: 2024-06-26
Payer: MEDICARE

## 2024-06-26 PROCEDURE — 93298 REM INTERROG DEV EVAL SCRMS: CPT

## 2024-06-27 ENCOUNTER — APPOINTMENT (OUTPATIENT)
Dept: NEUROLOGY | Facility: CLINIC | Age: 74
End: 2024-06-27

## 2024-07-15 ENCOUNTER — APPOINTMENT (OUTPATIENT)
Dept: VASCULAR SURGERY | Facility: CLINIC | Age: 74
End: 2024-07-15
Payer: MEDICARE

## 2024-07-15 VITALS
HEIGHT: 71 IN | SYSTOLIC BLOOD PRESSURE: 104 MMHG | WEIGHT: 206 LBS | BODY MASS INDEX: 28.84 KG/M2 | DIASTOLIC BLOOD PRESSURE: 67 MMHG

## 2024-07-15 DIAGNOSIS — I70.213 ATHEROSCLEROSIS OF NATIVE ARTERIES OF EXTREMITIES WITH INTERMITTENT CLAUDICATION, BILATERAL LEGS: ICD-10-CM

## 2024-07-15 DIAGNOSIS — M79.89 OTHER SPECIFIED SOFT TISSUE DISORDERS: ICD-10-CM

## 2024-07-15 PROCEDURE — 99203 OFFICE O/P NEW LOW 30 MIN: CPT

## 2024-07-15 PROCEDURE — 93925 LOWER EXTREMITY STUDY: CPT

## 2024-07-31 ENCOUNTER — NON-APPOINTMENT (OUTPATIENT)
Age: 74
End: 2024-07-31

## 2024-07-31 ENCOUNTER — APPOINTMENT (OUTPATIENT)
Dept: CARDIOLOGY | Facility: CLINIC | Age: 74
End: 2024-07-31
Payer: MEDICARE

## 2024-07-31 PROCEDURE — 93298 REM INTERROG DEV EVAL SCRMS: CPT

## 2024-08-01 ENCOUNTER — APPOINTMENT (OUTPATIENT)
Dept: NUTRITION | Facility: CLINIC | Age: 74
End: 2024-08-01

## 2024-08-01 ENCOUNTER — OUTPATIENT (OUTPATIENT)
Dept: OUTPATIENT SERVICES | Facility: HOSPITAL | Age: 74
LOS: 1 days | End: 2024-08-01
Payer: MEDICARE

## 2024-08-01 DIAGNOSIS — Z89.421 ACQUIRED ABSENCE OF OTHER RIGHT TOE(S): Chronic | ICD-10-CM

## 2024-08-01 DIAGNOSIS — E11.40 TYPE 2 DIABETES MELLITUS WITH DIABETIC NEUROPATHY, UNSPECIFIED: ICD-10-CM

## 2024-08-01 DIAGNOSIS — Z89.429 ACQUIRED ABSENCE OF OTHER TOE(S), UNSPECIFIED SIDE: Chronic | ICD-10-CM

## 2024-08-01 PROCEDURE — G0108: CPT

## 2024-08-05 NOTE — BRIEF OPERATIVE NOTE - NSICDXBRIEFPOSTOP_GEN_ALL_CORE_FT
Next Stop(s):  -Scheduling (waiting room)    For acid reflux/belching, avoid trigger foods and do not lay down at least 2 hours after eating. Can take Famotidine 10mg twice a day or Omeprazole 20mg daily.     For diarrhea, try to identify and avoid foods that trigger symptoms. Can take Loperamide 2mg up to 4 times daily as needed for diarrhea.     For arthritis, apply Diclofenac gel to affected joint up to 4 times a day.     Sleep hygiene tips:  Sleep only as much as you need to feel rested and then get out of bed   Keep a regular sleep schedule   Avoid forcing sleep   Exercise regularly for at least 20 minutes, preferably 4 to 5 hours before bedtime   Avoid caffeinated beverages after lunch   Avoid alcohol near bedtime  Avoid smoking, especially in the evening  Do not go to bed hungry   Adjust bedroom environment so it is cool, dark and quiet   Avoid use of light-emitting screens (phone/TV/computer/etc.) at least 1 hour before bedtime   Deal with your worries before bedtime    Stimulus control:  1. Go to bed only when sleepy.   2. Do not watch television, read, eat, or worry while in bed. Use bed only for sleep and sex.   3. Get out of bed if unable to fall asleep within 20 minutes and go to another room. Return to bed only when sleepy. Repeat this step as many times as necessary throughout the night.   4. Set an alarm clock to wake up at a fixed time each morning, including weekends.   5. Do not take a nap during the day.       POST-OP DIAGNOSIS:  Cellulitis of toe 20-Aug-2019 09:02:35  Royal Bhatia  Osteomyelitis 20-Aug-2019 09:02:22  Royal Bhatia

## 2024-08-07 ENCOUNTER — APPOINTMENT (OUTPATIENT)
Dept: ENDOCRINOLOGY | Facility: CLINIC | Age: 74
End: 2024-08-07

## 2024-08-07 ENCOUNTER — OUTPATIENT (OUTPATIENT)
Dept: OUTPATIENT SERVICES | Facility: HOSPITAL | Age: 74
LOS: 1 days | End: 2024-08-07
Payer: MEDICARE

## 2024-08-07 DIAGNOSIS — E11.9 TYPE 2 DIABETES MELLITUS WITHOUT COMPLICATIONS: ICD-10-CM

## 2024-08-07 DIAGNOSIS — Z89.421 ACQUIRED ABSENCE OF OTHER RIGHT TOE(S): Chronic | ICD-10-CM

## 2024-08-07 DIAGNOSIS — Z89.429 ACQUIRED ABSENCE OF OTHER TOE(S), UNSPECIFIED SIDE: Chronic | ICD-10-CM

## 2024-08-07 DIAGNOSIS — Z00.00 ENCOUNTER FOR GENERAL ADULT MEDICAL EXAMINATION WITHOUT ABNORMAL FINDINGS: ICD-10-CM

## 2024-08-07 LAB — GLUCOSE BLDC GLUCOMTR-MCNC: 65 MG/DL — LOW (ref 70–99)

## 2024-08-07 PROCEDURE — 82962 GLUCOSE BLOOD TEST: CPT

## 2024-08-07 PROCEDURE — 99214 OFFICE O/P EST MOD 30 MIN: CPT

## 2024-08-07 NOTE — HISTORY OF PRESENT ILLNESS
[FreeTextEntry1] : losing weight, finally decided to change lifestyle. now hypoglycemic with novolog prescribed by primary care.

## 2024-08-09 DIAGNOSIS — E11.65 TYPE 2 DIABETES MELLITUS WITH HYPERGLYCEMIA: ICD-10-CM

## 2024-08-09 DIAGNOSIS — E78.00 PURE HYPERCHOLESTEROLEMIA, UNSPECIFIED: ICD-10-CM

## 2024-08-15 NOTE — PATIENT PROFILE ADULT - PRIMARY SOURCE OF SUPPORT/COMFORT
The patient location is: [place]  The chief complaint leading to consultation is: follow up    Visit type: audiovisual    Face to Face time with patient: 20 minutes  30 minutes of total time spent on the encounter, which includes face to face time and non-face to face time preparing to see the patient (eg, review of tests), Obtaining and/or reviewing separately obtained history, Documenting clinical information in the electronic or other health record, Independently interpreting results (not separately reported) and communicating results to the patient/family/caregiver, or Care coordination (not separately reported).         Each patient to whom he or she provides medical services by telemedicine is:  (1) informed of the relationship between the physician and patient and the respective role of any other health care provider with respect to management of the patient; and (2) notified that he or she may decline to receive medical services by telemedicine and may withdraw from such care at any time.    Notes:     Subjective:       Patient ID: Gogo Muro is a 44 y.o. female.    Chief Complaint: Results (Pt is here to review labs)    Follow up after dx mammo and US. Biopsy is needed. Patient has concerns re: local lidocaine with epi.    Hx of dental work (wisdom teeth removed), and pt mother was dental assistant during the procedure.  Patient states during the dental procedure she had tachycardia and dentist felt due to the epinephrine.  Patient states the epinephrine may have caused her tachycardia.    Migraines stable, requests refill on Fioricet.    Travel advice, going on cruise soon and requests Scop patch. Has used previously and done well.              2/6/2024     8:12 AM 1/11/2023     7:26 AM 4/11/2022     1:30 PM   Depression Patient Health Questionnaire   Over the last two weeks how often have you been bothered by little interest or pleasure in doing things Not at all Not at all Not at all   Over the last two  weeks how often have you been bothered by feeling down, depressed or hopeless Not at all Not at all Not at all   PHQ-2 Total Score 0 0 0         7/15/2024    12:16 PM   GAD7   1. Feeling nervous, anxious, or on edge? 0   2. Not being able to stop or control worrying? 0   3. Worrying too much about different things? 0   4. Trouble relaxing? 1   5. Being so restless that it is hard to sit still? 0   6. Becoming easily annoyed or irritable? 1   7. Feeling afraid as if something awful might happen? 0   8. If you checked off any problems, how difficult have these problems made it for you to do your work, take care of things at home, or get along with other people? 0   NURIS-7 Score 2       Review of Systems   Constitutional:  Negative for activity change and unexpected weight change.   HENT:  Negative for hearing loss, rhinorrhea and trouble swallowing.    Eyes:  Negative for discharge and visual disturbance.   Respiratory:  Negative for chest tightness and wheezing.    Cardiovascular:  Negative for chest pain and palpitations.   Gastrointestinal:  Negative for blood in stool, constipation, diarrhea and vomiting.   Endocrine: Negative for polydipsia and polyuria.   Genitourinary:  Negative for difficulty urinating, dysuria, hematuria and menstrual problem.   Musculoskeletal:  Negative for arthralgias, joint swelling and neck pain.   Neurological:  Positive for headaches. Negative for weakness.   Psychiatric/Behavioral:  Negative for confusion and dysphoric mood.    All other systems reviewed and are negative.        Past Medical History:   Diagnosis Date    Hx of migraines     Pancreatitis     PONV (postoperative nausea and vomiting)      Past Surgical History:   Procedure Laterality Date     SECTION      x 3    EYE SURGERY      HYSTERECTOMY      2016     Family History   Problem Relation Name Age of Onset    Hypertension Mother Shey     Deep vein thrombosis Father      Hypertension Maternal Grandmother  "Grandmother     Deep vein thrombosis Maternal Grandmother Grandmother     Cancer Maternal Grandfather Grandfather         Lung    Stroke Brother Josep     Breast cancer Neg Hx         Review of patient's allergies indicates:   Allergen Reactions    Sulfamethoxazole-trimethoprim     Cephalexin Rash    Ibuprofen Palpitations    Sulfa (sulfonamide antibiotics) Rash       Current Outpatient Medications:     cyclobenzaprine (FLEXERIL) 5 MG tablet, Take 5 mg by mouth 2 (two) times daily as needed., Disp: , Rfl:     gabapentin (NEURONTIN) 100 MG capsule, TAKE 1 CAPSULE(100 MG) BY MOUTH THREE TIMES DAILY AS NEEDED FOR NERVE PAIN, Disp: 90 capsule, Rfl: 2    ivermectin (SOOLANTRA) 1 % Crea, Apply 1 Units topically once daily., Disp: , Rfl:     mupirocin (BACTROBAN) 2 % ointment, Apply topically 3 (three) times daily., Disp: 15 g, Rfl: 1    NURTEC 75 mg odt, Take 1 tablet (75 mg total) by mouth daily as needed for Migraine., Disp: 16 tablet, Rfl: 2    ondansetron (ZOFRAN-ODT) 4 MG TbDL, Take 1 tablet (4 mg total) by mouth every 6 (six) hours as needed (nausea)., Disp: 90 tablet, Rfl: 0    butalbital-acetaminophen-caffeine -40 mg (FIORICET, ESGIC) -40 mg per tablet, Take 1 tablet by mouth every 6 (six) hours as needed for Headaches., Disp: 30 tablet, Rfl: 2    scopolamine (TRANSDERM-SCOP) 1.3-1.5 mg (1 mg over 3 days), Place 1 patch onto the skin every 72 hours., Disp: 10 patch, Rfl: 0    valACYclovir (VALTREX) 1000 MG tablet, Take 2 tablets twice daily for 2 days at onset of viral outbreak; can continue on day #3 with 1 tablet twice daily for up to 10 days total. (Patient not taking: Reported on 8/15/2024), Disp: 60 tablet, Rfl: 5    valACYclovir (VALTREX) 500 MG tablet, Take 2 tablets (1,000 mg total) by mouth 2 (two) times daily. (Patient not taking: Reported on 8/15/2024), Disp: 120 tablet, Rfl: 0      Objective:      Ht 4' 7" (1.397 m)   Wt 39.5 kg (87 lb)   BMI 20.22 kg/m²   Physical Exam  Constitutional:  "      General: She is not in acute distress.     Appearance: Normal appearance. She is not ill-appearing or toxic-appearing.   Pulmonary:      Effort: Pulmonary effort is normal. No respiratory distress.   Neurological:      Mental Status: She is alert and oriented to person, place, and time.   Psychiatric:         Mood and Affect: Mood normal.         Behavior: Behavior normal.         Thought Content: Thought content normal.         Judgment: Judgment normal.         Assessment:       1. Abnormal finding on breast imaging    2. Migraine with aura and without status migrainosus, not intractable    3. Travel advice encounter    4. Nausea        Plan:       Abnormal finding on breast imaging    Migraine with aura and without status migrainosus, not intractable  -     butalbital-acetaminophen-caffeine -40 mg (FIORICET, ESGIC) -40 mg per tablet; Take 1 tablet by mouth every 6 (six) hours as needed for Headaches.  Dispense: 30 tablet; Refill: 2    Travel advice encounter    Nausea  -     scopolamine (TRANSDERM-SCOP) 1.3-1.5 mg (1 mg over 3 days); Place 1 patch onto the skin every 72 hours.  Dispense: 10 patch; Refill: 0            Previous records in Epic were reviewed, including the last 3 months of encounters, imaging, laboratory, and pathology reports.    Strict return precautions reviewed and patient verbalized understanding. Risks, benefits, and alternatives to the plan were reviewed in detail and all questions answered to the patient's satisfaction. Patient agrees to return to clinic or ER if symptoms worsen. 30 minutes total were spent on today's visit, not limited to but including time based on counseling and coordination of care.    Patient instructed that best way to communicate with my office staff is for patient to get on the [facility] epic patient portal to expedite communication and communication issues that may occur.  Patient was given instructions on how to get on the portal.  I encouraged  patient to obtain portal access as well.  Ultimately it is up to the patient to obtain access.  Patient voiced understanding.    This note was created using [employer] voice recognition software that occasionally may misinterpret phrases or words.    Follow up in about 2 weeks (around 8/29/2024) for after breast biopsy.     spouse

## 2024-08-19 ENCOUNTER — APPOINTMENT (OUTPATIENT)
Dept: NUTRITION | Facility: CLINIC | Age: 74
End: 2024-08-19

## 2024-08-20 RX ORDER — LANCETS 33 GAUGE
EACH MISCELLANEOUS
Qty: 100 | Refills: 2 | Status: ACTIVE | COMMUNITY
Start: 2024-08-20 | End: 1900-01-01

## 2024-08-20 RX ORDER — BLOOD-GLUCOSE METER
W/DEVICE EACH MISCELLANEOUS
Qty: 1 | Refills: 0 | Status: ACTIVE | COMMUNITY
Start: 2024-08-20 | End: 1900-01-01

## 2024-08-20 RX ORDER — BLOOD SUGAR DIAGNOSTIC
STRIP MISCELLANEOUS 3 TIMES DAILY
Qty: 1 | Refills: 5 | Status: ACTIVE | COMMUNITY
Start: 2024-08-20 | End: 1900-01-01

## 2024-08-30 ENCOUNTER — APPOINTMENT (OUTPATIENT)
Dept: PODIATRY | Facility: CLINIC | Age: 74
End: 2024-08-30

## 2024-08-30 ENCOUNTER — OUTPATIENT (OUTPATIENT)
Dept: OUTPATIENT SERVICES | Facility: HOSPITAL | Age: 74
LOS: 1 days | End: 2024-08-30
Payer: MEDICARE

## 2024-08-30 DIAGNOSIS — Z89.431 ACQUIRED ABSENCE OF RIGHT FOOT: ICD-10-CM

## 2024-08-30 DIAGNOSIS — Z89.421 ACQUIRED ABSENCE OF OTHER RIGHT TOE(S): Chronic | ICD-10-CM

## 2024-08-30 DIAGNOSIS — E11.40 TYPE 2 DIABETES MELLITUS WITH DIABETIC NEUROPATHY, UNSPECIFIED: ICD-10-CM

## 2024-08-30 DIAGNOSIS — Z00.00 ENCOUNTER FOR GENERAL ADULT MEDICAL EXAMINATION WITHOUT ABNORMAL FINDINGS: ICD-10-CM

## 2024-08-30 DIAGNOSIS — Z79.4 TYPE 2 DIABETES MELLITUS W/OUT COMPLICATIONS: ICD-10-CM

## 2024-08-30 DIAGNOSIS — Z89.429 ACQUIRED ABSENCE OF OTHER TOE(S), UNSPECIFIED SIDE: Chronic | ICD-10-CM

## 2024-08-30 DIAGNOSIS — E11.9 TYPE 2 DIABETES MELLITUS W/OUT COMPLICATIONS: ICD-10-CM

## 2024-08-30 PROCEDURE — 99213 OFFICE O/P EST LOW 20 MIN: CPT | Mod: GC

## 2024-08-30 PROCEDURE — 99213 OFFICE O/P EST LOW 20 MIN: CPT

## 2024-08-30 NOTE — ASSESSMENT
[FreeTextEntry1] : Modified ADA Diabetic Foot Risk Classification 3  A1c reviewed  -Loss of protective sensation: yes  -Presence of foot deformity:  yes   -presence of pre-ulcerative lesion: no   -hemorrhage into callus:  no -atrophy of heel or metatarsal fat pads: no  -Diabetic neurovascular foot assessment  performed.  -Discussed with patient diabetic foot hygiene.Patient instructed to regularly check the bottom of the feet -nails and callus debrided  -Return 3  month  Class A -non-traumatic amputation of foot/digit  [Verbal] : verbal [Patient] : patient [Good - alert, interested, motivated] : Good - alert, interested, motivated

## 2024-08-30 NOTE — PHYSICAL EXAM
[Ankle Swelling (On Exam)] : not present [Ankle Swelling Bilaterally] : bilaterally  [Varicose Veins Of Lower Extremities] : bilaterally [] : on both lower extremities [1+] : left foot dorsalis pedis 1+ [de-identified] : R TMA. L hallux and 2nd digit amputated - healed  [Vibration Dec.] : diminished vibratory sensation at the level of the toes [Diminished Throughout Right Foot] : diminished sensation with monofilament testing throughout right foot [Diminished Throughout Left Foot] : diminished sensation with monofilament testing throughout left foot [FreeTextEntry1] : 0/10 w/ SWMF [Oriented To Time, Place, And Person] : oriented to person, place, and time

## 2024-08-30 NOTE — HISTORY OF PRESENT ILLNESS
[Diabetic Shoes] : diabetic shoes [Cane] : a cane [FreeTextEntry1] : 73 y/o diabetic male e today for DM foot evaluation. last a1c 11.2% 10/2023  8/30 - RTC for diabetic foot eval and left foot nail trim. Denies changes to health since previous visit.

## 2024-08-30 NOTE — PHYSICAL EXAM
[Ankle Swelling (On Exam)] : not present [Ankle Swelling Bilaterally] : bilaterally  [Varicose Veins Of Lower Extremities] : bilaterally [] : on both lower extremities [1+] : left foot dorsalis pedis 1+ [de-identified] : R TMA. L hallux and 2nd digit amputated - healed  [Vibration Dec.] : diminished vibratory sensation at the level of the toes [Diminished Throughout Right Foot] : diminished sensation with monofilament testing throughout right foot [Diminished Throughout Left Foot] : diminished sensation with monofilament testing throughout left foot [FreeTextEntry1] : 0/10 w/ SWMF [Oriented To Time, Place, And Person] : oriented to person, place, and time

## 2024-09-04 ENCOUNTER — APPOINTMENT (OUTPATIENT)
Dept: CARDIOLOGY | Facility: CLINIC | Age: 74
End: 2024-09-04

## 2024-09-05 NOTE — PATIENT PROFILE ADULT - NSPROHMDIABETBLDGLCTARGET_GEN_A_NUR
Patient was given instructions and they were reviewed with patient. Patient was given sutab coupon. Patient was given lab orders. Patient ct scan and patients order for colon was faxed to central scheduling. No pa was required. Patient was given AVS with apt details. Patient signed DARREL was signed by patient and faxed to Dr. Carlos Haile. DARREL was uploaded to media manger as well. 9/5/24 LRA    unknown

## 2024-09-06 DIAGNOSIS — E11.9 TYPE 2 DIABETES MELLITUS WITHOUT COMPLICATIONS: ICD-10-CM
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
## 2024-09-06 DIAGNOSIS — E11.40 TYPE 2 DIABETES MELLITUS WITH DIABETIC NEUROPATHY, UNSPECIFIED: ICD-10-CM
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
## 2024-09-06 DIAGNOSIS — Z89.431 ACQUIRED ABSENCE OF RIGHT FOOT: ICD-10-CM

## 2024-10-07 ENCOUNTER — APPOINTMENT (OUTPATIENT)
Dept: CARDIOLOGY | Facility: CLINIC | Age: 74
End: 2024-10-07

## 2024-11-05 ENCOUNTER — OUTPATIENT (OUTPATIENT)
Dept: OUTPATIENT SERVICES | Facility: HOSPITAL | Age: 74
LOS: 1 days | End: 2024-11-05
Payer: MEDICARE

## 2024-11-05 ENCOUNTER — APPOINTMENT (OUTPATIENT)
Dept: PODIATRY | Facility: CLINIC | Age: 74
End: 2024-11-05
Payer: MEDICARE

## 2024-11-05 DIAGNOSIS — Z89.421 ACQUIRED ABSENCE OF OTHER RIGHT TOE(S): Chronic | ICD-10-CM

## 2024-11-05 DIAGNOSIS — Z89.429 ACQUIRED ABSENCE OF OTHER TOE(S), UNSPECIFIED SIDE: Chronic | ICD-10-CM

## 2024-11-05 DIAGNOSIS — Z00.00 ENCOUNTER FOR GENERAL ADULT MEDICAL EXAMINATION WITHOUT ABNORMAL FINDINGS: ICD-10-CM

## 2024-11-05 DIAGNOSIS — Z89.431 ACQUIRED ABSENCE OF RIGHT FOOT: ICD-10-CM

## 2024-11-05 DIAGNOSIS — E11.42 TYPE 2 DIABETES MELLITUS WITH DIABETIC POLYNEUROPATHY: ICD-10-CM

## 2024-11-05 PROCEDURE — 99214 OFFICE O/P EST MOD 30 MIN: CPT | Mod: 95

## 2024-11-05 PROCEDURE — 99214 OFFICE O/P EST MOD 30 MIN: CPT | Mod: GC

## 2024-11-05 RX ORDER — HYDROCORTISONE 1 %
12 CREAM (GRAM) TOPICAL
Qty: 45 | Refills: 0 | Status: ACTIVE | COMMUNITY
Start: 2024-11-05 | End: 1900-01-01

## 2024-11-05 RX ORDER — UREA 40 G/100G
40 CREAM TOPICAL
Qty: 1 | Refills: 0 | Status: ACTIVE | COMMUNITY
Start: 2024-11-05 | End: 1900-01-01

## 2024-11-11 ENCOUNTER — NON-APPOINTMENT (OUTPATIENT)
Age: 74
End: 2024-11-11

## 2024-11-11 ENCOUNTER — APPOINTMENT (OUTPATIENT)
Dept: CARDIOLOGY | Facility: CLINIC | Age: 74
End: 2024-11-11
Payer: MEDICARE

## 2024-11-11 PROCEDURE — 93298 REM INTERROG DEV EVAL SCRMS: CPT

## 2024-11-18 ENCOUNTER — EMERGENCY (EMERGENCY)
Facility: HOSPITAL | Age: 74
LOS: 0 days | Discharge: LEFT BEFORE DISCHARGE | End: 2024-11-19
Attending: EMERGENCY MEDICINE
Payer: MEDICARE

## 2024-11-18 VITALS
RESPIRATION RATE: 18 BRPM | DIASTOLIC BLOOD PRESSURE: 106 MMHG | SYSTOLIC BLOOD PRESSURE: 225 MMHG | WEIGHT: 205.03 LBS | OXYGEN SATURATION: 97 % | HEART RATE: 83 BPM | HEIGHT: 71 IN | TEMPERATURE: 98 F

## 2024-11-18 DIAGNOSIS — I25.10 ATHEROSCLEROTIC HEART DISEASE OF NATIVE CORONARY ARTERY WITHOUT ANGINA PECTORIS: ICD-10-CM

## 2024-11-18 DIAGNOSIS — Z95.5 PRESENCE OF CORONARY ANGIOPLASTY IMPLANT AND GRAFT: ICD-10-CM

## 2024-11-18 DIAGNOSIS — E11.51 TYPE 2 DIABETES MELLITUS WITH DIABETIC PERIPHERAL ANGIOPATHY WITHOUT GANGRENE: ICD-10-CM

## 2024-11-18 DIAGNOSIS — Z89.429 ACQUIRED ABSENCE OF OTHER TOE(S), UNSPECIFIED SIDE: Chronic | ICD-10-CM

## 2024-11-18 DIAGNOSIS — Z53.29 PROCEDURE AND TREATMENT NOT CARRIED OUT BECAUSE OF PATIENT'S DECISION FOR OTHER REASONS: ICD-10-CM

## 2024-11-18 DIAGNOSIS — E11.40 TYPE 2 DIABETES MELLITUS WITH DIABETIC NEUROPATHY, UNSPECIFIED: ICD-10-CM

## 2024-11-18 DIAGNOSIS — Z89.422 ACQUIRED ABSENCE OF OTHER LEFT TOE(S): ICD-10-CM

## 2024-11-18 DIAGNOSIS — Z87.891 PERSONAL HISTORY OF NICOTINE DEPENDENCE: ICD-10-CM

## 2024-11-18 DIAGNOSIS — Z89.421 ACQUIRED ABSENCE OF OTHER RIGHT TOE(S): ICD-10-CM

## 2024-11-18 DIAGNOSIS — I10 ESSENTIAL (PRIMARY) HYPERTENSION: ICD-10-CM

## 2024-11-18 DIAGNOSIS — E78.00 PURE HYPERCHOLESTEROLEMIA, UNSPECIFIED: ICD-10-CM

## 2024-11-18 DIAGNOSIS — Z89.421 ACQUIRED ABSENCE OF OTHER RIGHT TOE(S): Chronic | ICD-10-CM

## 2024-11-18 PROCEDURE — 99283 EMERGENCY DEPT VISIT LOW MDM: CPT

## 2024-11-18 PROCEDURE — 99284 EMERGENCY DEPT VISIT MOD MDM: CPT

## 2024-11-18 PROCEDURE — 82962 GLUCOSE BLOOD TEST: CPT

## 2024-11-18 NOTE — ED ADULT TRIAGE NOTE - CHIEF COMPLAINT QUOTE
PT c/o sharp pains in bottom of his feet w/ tingling since yesterday, pt has hx of diabetes fs 233  in triage
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
## 2024-11-19 DIAGNOSIS — E11.42 TYPE 2 DIABETES MELLITUS WITH DIABETIC POLYNEUROPATHY: ICD-10-CM

## 2024-11-19 DIAGNOSIS — Z89.431 ACQUIRED ABSENCE OF RIGHT FOOT: ICD-10-CM
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
## 2024-11-19 RX ORDER — LISINOPRIL 40 MG
40 TABLET ORAL ONCE
Refills: 0 | Status: DISCONTINUED | OUTPATIENT
Start: 2024-11-19 | End: 2024-11-19

## 2024-11-19 RX ORDER — GABAPENTIN 300 MG/1
300 CAPSULE ORAL ONCE
Refills: 0 | Status: DISCONTINUED | OUTPATIENT
Start: 2024-11-19 | End: 2024-11-19

## 2024-11-19 NOTE — ED ADULT NURSE NOTE - NS ED NURSE ELOPE COMMENTS
Announced leaving to [facility] during evaluation. Pt refused VS prior to leaving. Pt w/ no IV access

## 2024-11-19 NOTE — ED ADULT NURSE NOTE - AVIAN FLU SYMPTOMS
No
FAMILY HISTORY:  No family history of adverse response to anesthesia, No Family history of complications following anesthesia. No known family history of bleeding disorders; no family history of disproportionate bleeding following minor procedures.

## 2024-11-19 NOTE — ED PROVIDER NOTE - CLINICAL SUMMARY MEDICAL DECISION MAKING FREE TEXT BOX
75 yo M with PMH of hypertension, high cholesterol, diabetes, PAD, CAD with 1 stent, bilateral feet (right all toes amputated, left foot first and second toes amputated), diabetic neuropathy presents with complaint of bilateral feet pain.  Pain is shooting sharp in nature and is on/off.  Patient does not know if he takes any medication for neuropathy (pt does not know any of his meds, here with his ex wife who is a patient herself, and this patient states she would know--when I asked her, she did not recall any of his meds).  Otherwise denies fever, chills, coughing, chest pain, shortness of breath, Pt skipped all his daily meds as he spent the day here with his ex-wife while she was undergoing medical evaluation therefore this likely why BP high. Ordered meds for BP and pain, pt left prior to getting any meds as his ex-wife was discharged and he eloped (to go home with her).

## 2024-11-19 NOTE — ED PROVIDER NOTE - PHYSICAL EXAMINATION
CONSTITUTIONAL: Well-appearing; well-nourished; in no apparent distress.   EYES: PERRL; EOM intact.   CARDIOVASCULAR: Normal S1, S2; no murmurs, rubs, or gallops.   RESPIRATORY: Normal chest excursion with respiration; breath sounds clear and equal bilaterally; no wheezes, rhonchi, or rales.  GI/: Normal bowel sounds; non-distended; non-tender; no palpable organomegaly.   MS: all toes amputations to right foot. left 1st/2nd toe amputation to left foot. feet temp equal b/l with good cap refills.   SKIN: small callus noted to right foot (forefoot) without bleeding, drainage, erythema. no DFU noted to b/l feet   NEURO/PSYCH: A & O x 4; grossly unremarkable.

## 2024-11-19 NOTE — ED PROVIDER NOTE - OBJECTIVE STATEMENT
74 years old male history of hypertension, high cholesterol, diabetes, PAD, CAD with 1 stent, bilateral feet (right all toes amputation, left first and second toes amputation), diabetic neuropathy presents complaint of bilateral feet pain.  Pain is shooting like an off-and-on.  Patient does not know if he takes any medication for neuropathy.  Was here because his ex-wife was being evaluated in the ED so he just wanted to get his feet checked.  Otherwise denies fever, chills, coughing, chest pain, shortness of breath,

## 2024-11-21 ENCOUNTER — APPOINTMENT (OUTPATIENT)
Dept: PODIATRY | Facility: CLINIC | Age: 74
End: 2024-11-21

## 2024-12-02 NOTE — H&P PST ADULT - PRO INTERPRETER NEED 2
Rufino Armenta is calling to request a refill on the following medication(s):    Medication Request:  Requested Prescriptions     Pending Prescriptions Disp Refills    TRUE METRIX BLOOD GLUCOSE TEST strip [Pharmacy Med Name: True Metrix Blood Glucose Test In Vitro Strip] 400 strip 3     Sig: TEST BLOOD SUGAR FOUR TIMES DAILY AS NEEDED    TRUEplus Lancets 33G MISC [Pharmacy Med Name: TRUEplus Lancets 33G Miscellaneous] 400 each 3     Sig: TEST BLOOD SUGAR 4 TIMES DAILY AS NEEDED       Last Visit Date (If Applicable):  9/4/2024    Next Visit Date:    12/6/2024               English

## 2024-12-04 ENCOUNTER — APPOINTMENT (OUTPATIENT)
Dept: ENDOCRINOLOGY | Facility: CLINIC | Age: 74
End: 2024-12-04

## 2024-12-16 ENCOUNTER — APPOINTMENT (OUTPATIENT)
Dept: CARDIOLOGY | Facility: CLINIC | Age: 74
End: 2024-12-16
Payer: MEDICARE

## 2024-12-16 ENCOUNTER — NON-APPOINTMENT (OUTPATIENT)
Age: 74
End: 2024-12-16

## 2024-12-16 PROCEDURE — 93298 REM INTERROG DEV EVAL SCRMS: CPT

## 2025-01-16 ENCOUNTER — NON-APPOINTMENT (OUTPATIENT)
Age: 75
End: 2025-01-16

## 2025-01-16 ENCOUNTER — APPOINTMENT (OUTPATIENT)
Dept: CARDIOLOGY | Facility: CLINIC | Age: 75
End: 2025-01-16
Payer: MEDICARE

## 2025-01-16 PROCEDURE — 93298 REM INTERROG DEV EVAL SCRMS: CPT

## 2025-01-17 ENCOUNTER — OUTPATIENT (OUTPATIENT)
Dept: OUTPATIENT SERVICES | Facility: HOSPITAL | Age: 75
LOS: 1 days | End: 2025-01-17
Payer: MEDICARE

## 2025-01-17 ENCOUNTER — APPOINTMENT (OUTPATIENT)
Dept: PODIATRY | Facility: CLINIC | Age: 75
End: 2025-01-17
Payer: MEDICARE

## 2025-01-17 DIAGNOSIS — E11.621 TYPE 2 DIABETES MELLITUS WITH FOOT ULCER: ICD-10-CM

## 2025-01-17 DIAGNOSIS — E11.42 TYPE 2 DIABETES MELLITUS WITH DIABETIC POLYNEUROPATHY: ICD-10-CM

## 2025-01-17 DIAGNOSIS — Z00.00 ENCOUNTER FOR GENERAL ADULT MEDICAL EXAMINATION WITHOUT ABNORMAL FINDINGS: ICD-10-CM

## 2025-01-17 DIAGNOSIS — L97.519 NON-PRESSURE CHRONIC ULCER OF OTHER PART OF RIGHT FOOT WITH UNSPECIFIED SEVERITY: ICD-10-CM

## 2025-01-17 DIAGNOSIS — L97.509 TYPE 2 DIABETES MELLITUS WITH FOOT ULCER: ICD-10-CM

## 2025-01-17 DIAGNOSIS — Z89.421 ACQUIRED ABSENCE OF OTHER RIGHT TOE(S): Chronic | ICD-10-CM

## 2025-01-17 DIAGNOSIS — Z89.429 ACQUIRED ABSENCE OF OTHER TOE(S), UNSPECIFIED SIDE: Chronic | ICD-10-CM

## 2025-01-17 PROCEDURE — 99213 OFFICE O/P EST LOW 20 MIN: CPT | Mod: 25

## 2025-01-17 PROCEDURE — 99213 OFFICE O/P EST LOW 20 MIN: CPT | Mod: 25,GC

## 2025-01-17 PROCEDURE — 11042 DBRDMT SUBQ TIS 1ST 20SQCM/<: CPT | Mod: RT,GC

## 2025-01-17 PROCEDURE — 11042 DBRDMT SUBQ TIS 1ST 20SQCM/<: CPT | Mod: 50

## 2025-01-24 ENCOUNTER — APPOINTMENT (OUTPATIENT)
Dept: PODIATRY | Facility: CLINIC | Age: 75
End: 2025-01-24

## 2025-01-30 ENCOUNTER — APPOINTMENT (OUTPATIENT)
Dept: PODIATRY | Facility: CLINIC | Age: 75
End: 2025-01-30

## 2025-01-30 DIAGNOSIS — E11.42 TYPE 2 DIABETES MELLITUS WITH DIABETIC POLYNEUROPATHY: ICD-10-CM
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
## 2025-01-30 DIAGNOSIS — E11.621 TYPE 2 DIABETES MELLITUS WITH FOOT ULCER: ICD-10-CM
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
## 2025-01-30 DIAGNOSIS — L97.519 NON-PRESSURE CHRONIC ULCER OF OTHER PART OF RIGHT FOOT WITH UNSPECIFIED SEVERITY: ICD-10-CM

## 2025-02-12 ENCOUNTER — APPOINTMENT (OUTPATIENT)
Dept: ELECTROPHYSIOLOGY | Facility: CLINIC | Age: 75
End: 2025-02-12

## 2025-02-21 ENCOUNTER — APPOINTMENT (OUTPATIENT)
Dept: ELECTROPHYSIOLOGY | Facility: CLINIC | Age: 75
End: 2025-02-21
Payer: MEDICARE

## 2025-02-21 ENCOUNTER — NON-APPOINTMENT (OUTPATIENT)
Age: 75
End: 2025-02-21

## 2025-02-21 ENCOUNTER — APPOINTMENT (OUTPATIENT)
Dept: PODIATRY | Facility: CLINIC | Age: 75
End: 2025-02-21

## 2025-02-21 ENCOUNTER — OUTPATIENT (OUTPATIENT)
Dept: OUTPATIENT SERVICES | Facility: HOSPITAL | Age: 75
LOS: 1 days | End: 2025-02-21
Payer: MEDICARE

## 2025-02-21 VITALS
HEART RATE: 87 BPM | BODY MASS INDEX: 26.46 KG/M2 | WEIGHT: 189 LBS | HEIGHT: 71 IN | TEMPERATURE: 97.1 F | SYSTOLIC BLOOD PRESSURE: 165 MMHG | DIASTOLIC BLOOD PRESSURE: 79 MMHG

## 2025-02-21 DIAGNOSIS — Z86.73 PERSONAL HISTORY OF TRANSIENT ISCHEMIC ATTACK (TIA), AND CEREBRAL INFARCTION W/OUT RESIDUAL DEFICITS: ICD-10-CM
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
## 2025-02-21 DIAGNOSIS — Z45.09 ENCOUNTER FOR ADJUSTMENT AND MANAGEMENT OF OTHER CARDIAC DEVICE: ICD-10-CM

## 2025-02-21 DIAGNOSIS — Z89.421 ACQUIRED ABSENCE OF OTHER RIGHT TOE(S): Chronic | ICD-10-CM

## 2025-02-21 DIAGNOSIS — Z89.429 ACQUIRED ABSENCE OF OTHER TOE(S), UNSPECIFIED SIDE: Chronic | ICD-10-CM

## 2025-02-21 DIAGNOSIS — Z00.00 ENCOUNTER FOR GENERAL ADULT MEDICAL EXAMINATION WITHOUT ABNORMAL FINDINGS: ICD-10-CM

## 2025-02-21 DIAGNOSIS — L97.509 NON-PRESSURE CHRONIC ULCER OF OTHER PART OF UNSPECIFIED FOOT WITH UNSPECIFIED SEVERITY: ICD-10-CM

## 2025-02-21 DIAGNOSIS — E11.42 TYPE 2 DIABETES MELLITUS WITH DIABETIC POLYNEUROPATHY: ICD-10-CM
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
## 2025-02-21 DIAGNOSIS — I48.0 PAROXYSMAL ATRIAL FIBRILLATION: ICD-10-CM

## 2025-02-21 DIAGNOSIS — L97.519 NON-PRESSURE CHRONIC ULCER OF OTHER PART OF RIGHT FOOT WITH UNSPECIFIED SEVERITY: ICD-10-CM

## 2025-02-21 PROCEDURE — 11042 DBRDMT SUBQ TIS 1ST 20SQCM/<: CPT

## 2025-02-21 PROCEDURE — 99214 OFFICE O/P EST MOD 30 MIN: CPT | Mod: 25

## 2025-02-21 PROCEDURE — 93285 PRGRMG DEV EVAL SCRMS IP: CPT

## 2025-02-21 RX ORDER — INSULIN ASPART 100 [IU]/ML
100 INJECTION, SOLUTION INTRAVENOUS; SUBCUTANEOUS
Refills: 0 | Status: ACTIVE | COMMUNITY

## 2025-02-21 RX ORDER — OMEPRAZOLE 40 MG/1
40 CAPSULE, DELAYED RELEASE ORAL DAILY
Refills: 0 | Status: ACTIVE | COMMUNITY

## 2025-02-21 RX ORDER — SITAGLIPTIN AND METFORMIN HYDROCHLORIDE 50; 1000 MG/1; MG/1
50-1000 TABLET, FILM COATED ORAL TWICE DAILY
Refills: 0 | Status: ACTIVE | COMMUNITY

## 2025-02-21 RX ORDER — NIFEDIPINE 60 MG/1
60 TABLET, EXTENDED RELEASE ORAL DAILY
Refills: 0 | Status: ACTIVE | COMMUNITY

## 2025-02-21 RX ORDER — HYDROCHLOROTHIAZIDE 12.5 MG/1
12.5 TABLET ORAL DAILY
Refills: 0 | Status: ACTIVE | COMMUNITY

## 2025-02-21 NOTE — PATIENT PROFILE ADULT - NSPROCHRONICPAIN_GEN_A_NUR
Past Medical History:   Diagnosis Date    Acid reflux     Anemia, unspecified     Anxiety     Depression     Down's syndrome     GERD (gastroesophageal reflux disease)     Hiatal hernia     Hypothyroidism, unspecified        Past Surgical History:   Procedure Laterality Date    APPENDECTOMY      CATARACT EXTRACTION      CHOLECYSTECTOMY      COLONOSCOPY      ESOPHAGOGASTRODUODENOSCOPY      ESOPHAGOGASTRODUODENOSCOPY N/A 11/7/2023    Procedure: EGD (ESOPHAGOGASTRODUODENOSCOPY);  Surgeon: Robi Orta MD;  Location: [facility] PAT (2ND FLR);  Service: Endoscopy;  Laterality: N/A;  10/6/23: instructions mailed to mother, hx downs syndrome-GD  11/1-precall complete-MS    ESOPHAGOGASTRODUODENOSCOPY N/A 11/21/2023    Procedure: EGD (ESOPHAGOGASTRODUODENOSCOPY);  Surgeon: Robi Orta MD;  Location: [facility] PAT (2ND FLR);  Service: Endoscopy;  Laterality: N/A;  11/08 mail instr-Repeat upper endoscopy in 1 week for retreatment. You-tt  11/15/23-Precall complete-DS    ESOPHAGOGASTRODUODENOSCOPY N/A 12/7/2023    Procedure: EGD (ESOPHAGOGASTRODUODENOSCOPY);  Surgeon: Robi Orta MD;  Location: [facility] PAT (2ND FLR);  Service: Endoscopy;  Laterality: N/A;  11/30/23-Precall complete-DS    ESOPHAGOGASTRODUODENOSCOPY N/A 12/20/2023    Procedure: EGD (ESOPHAGOGASTRODUODENOSCOPY);  Surgeon: Robi Orta MD;  Location: [facility] PAT (2ND FLR);  Service: Endoscopy;  Laterality: N/A;  12/08 portal instr-Repeat upper endoscopy in 2 weeks for                          retreatment with flurosocopy using the [id number]. Orta-tt  12/13-precall complete-MS       Review of patient's allergies indicates:   Allergen Reactions    Cefdinir      rash    Vancomycin analogues        No current facility-administered medications on file prior to encounter.     Current Outpatient Medications on File Prior to Encounter   Medication Sig    brexpiprazole (REXULTI) 2 mg Tab Take 2 mg by mouth.    levothyroxine (SYNTHROID) 50 MCG tablet  Take 50 mcg by mouth before breakfast.    pantoprazole (PROTONIX) 40 MG tablet Take 40 mg by mouth once daily.    polyethylene glycol (GLYCOLAX) 17 gram PwPk Take by mouth.    wheat dextrin (BENEFIBER SUGAR FREE, DEXTRIN,) 3 gram/3.8 gram Powd Take by mouth.    ferrous sulfate 325 (65 FE) MG EC tablet Take 325 mg by mouth 3 (three) times daily with meals.    FLUoxetine 10 MG capsule Take 10 mg by mouth once daily.    fluticasone propionate (FLONASE) 50 mcg/actuation nasal spray 1 spray by Each Nostril route once daily.    neomycin-polymyxin-dexamethasone (DEXACINE) 3.5 mg/g-10,000 unit/g-0.1 % Oint 3 (three) times daily.    prednisoLONE acetate (PRED FORTE) 1 % DrpS 1 drop 4 (four) times daily.     Family History       Problem Relation (Age of Onset)    Cancer Paternal Grandfather          Tobacco Use    Smoking status: Never    Smokeless tobacco: Never   Substance and Sexual Activity    Alcohol use: Never    Drug use: Never    Sexual activity: Not on file     Review of Systems  Objective:     Vital Signs (Most Recent):  Temp: 97.7 °F (36.5 °C) (02/21/25 1431)  Pulse: 69 (02/21/25 1615)  Resp: 14 (02/21/25 1615)  BP: (!) 88/47 (02/21/25 1615)  SpO2: (!) 93 % (02/21/25 1615) Vital Signs (24h Range):  Temp:  [97.7 °F (36.5 °C)-98.2 °F (36.8 °C)] 97.7 °F (36.5 °C)  Pulse:  [60-90] 69  Resp:  [14-21] 14  SpO2:  [88 %-98 %] 93 %  BP: ()/(46-79) 88/47     Weight: 45.4 kg (100 lb 1.4 oz)  Body mass index is 17.73 kg/m².     Physical Exam  Constitutional:       Appearance: He is not ill-appearing.      Comments: Sedated   Cardiovascular:      Rate and Rhythm: Normal rate and regular rhythm.      Pulses: Normal pulses.      Heart sounds: Normal heart sounds. No murmur heard.  Pulmonary:      Effort: Pulmonary effort is normal. No respiratory distress.      Breath sounds: Normal breath sounds.      Comments: Wearing nasal cannula  Abdominal:      General: Abdomen is flat.      Palpations: Abdomen is soft.    Musculoskeletal:      Right lower leg: No edema.      Left lower leg: No edema.   Neurological:      Comments: asleep                Significant Labs: All pertinent labs within the past 24 hours have been reviewed.    Significant Imaging: I have reviewed all pertinent imaging results/findings within the past 24 hours.   no

## 2025-02-25 ENCOUNTER — APPOINTMENT (OUTPATIENT)
Dept: PODIATRY | Facility: CLINIC | Age: 75
End: 2025-02-25
Payer: MEDICARE

## 2025-02-25 ENCOUNTER — OUTPATIENT (OUTPATIENT)
Dept: OUTPATIENT SERVICES | Facility: HOSPITAL | Age: 75
LOS: 1 days | End: 2025-02-25
Payer: MEDICARE

## 2025-02-25 DIAGNOSIS — Z00.00 ENCOUNTER FOR GENERAL ADULT MEDICAL EXAMINATION WITHOUT ABNORMAL FINDINGS: ICD-10-CM

## 2025-02-25 DIAGNOSIS — L97.509 TYPE 2 DIABETES MELLITUS WITH FOOT ULCER: ICD-10-CM

## 2025-02-25 DIAGNOSIS — Z89.429 ACQUIRED ABSENCE OF OTHER TOE(S), UNSPECIFIED SIDE: Chronic | ICD-10-CM

## 2025-02-25 DIAGNOSIS — L97.519 NON-PRESSURE CHRONIC ULCER OF OTHER PART OF RIGHT FOOT WITH UNSPECIFIED SEVERITY: ICD-10-CM

## 2025-02-25 DIAGNOSIS — E11.621 TYPE 2 DIABETES MELLITUS WITH FOOT ULCER: ICD-10-CM

## 2025-02-25 PROCEDURE — 11042 DBRDMT SUBQ TIS 1ST 20SQCM/<: CPT | Mod: RT

## 2025-03-04 ENCOUNTER — APPOINTMENT (OUTPATIENT)
Dept: CARDIOLOGY | Facility: CLINIC | Age: 75
End: 2025-03-04
Payer: MEDICARE

## 2025-03-04 VITALS
BODY MASS INDEX: 36.82 KG/M2 | HEART RATE: 76 BPM | HEIGHT: 61 IN | SYSTOLIC BLOOD PRESSURE: 143 MMHG | TEMPERATURE: 98 F | WEIGHT: 195 LBS | DIASTOLIC BLOOD PRESSURE: 77 MMHG

## 2025-03-04 DIAGNOSIS — I48.0 PAROXYSMAL ATRIAL FIBRILLATION: ICD-10-CM

## 2025-03-04 DIAGNOSIS — E11.42 TYPE 2 DIABETES MELLITUS WITH DIABETIC POLYNEUROPATHY: ICD-10-CM

## 2025-03-04 DIAGNOSIS — I10 ESSENTIAL (PRIMARY) HYPERTENSION: ICD-10-CM

## 2025-03-04 DIAGNOSIS — I63.9 CEREBRAL INFARCTION, UNSPECIFIED: ICD-10-CM

## 2025-03-04 PROCEDURE — 99204 OFFICE O/P NEW MOD 45 MIN: CPT

## 2025-03-04 PROCEDURE — G2211 COMPLEX E/M VISIT ADD ON: CPT

## 2025-03-04 PROCEDURE — 93000 ELECTROCARDIOGRAM COMPLETE: CPT

## 2025-03-07 NOTE — DIETITIAN INITIAL EVALUATION ADULT. - FACTORS AFF FOOD INTAKE
Labs reviewed and stable. No concerns for upcoming surgery. denies difficulty swallowing/chewing; no GI distress noted, LBM 11/19/other (specify)

## 2025-03-11 ENCOUNTER — APPOINTMENT (OUTPATIENT)
Dept: PODIATRY | Facility: CLINIC | Age: 75
End: 2025-03-11
Payer: MEDICARE

## 2025-03-11 ENCOUNTER — OUTPATIENT (OUTPATIENT)
Dept: OUTPATIENT SERVICES | Facility: HOSPITAL | Age: 75
LOS: 1 days | End: 2025-03-11
Payer: MEDICARE

## 2025-03-11 DIAGNOSIS — L97.519 NON-PRESSURE CHRONIC ULCER OF OTHER PART OF RIGHT FOOT WITH UNSPECIFIED SEVERITY: ICD-10-CM

## 2025-03-11 DIAGNOSIS — E11.621 TYPE 2 DIABETES MELLITUS WITH FOOT ULCER: ICD-10-CM
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
## 2025-03-11 DIAGNOSIS — Z00.00 ENCOUNTER FOR GENERAL ADULT MEDICAL EXAMINATION WITHOUT ABNORMAL FINDINGS: ICD-10-CM
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
## 2025-03-11 PROCEDURE — 11042 DBRDMT SUBQ TIS 1ST 20SQCM/<: CPT | Mod: RT

## 2025-03-11 PROCEDURE — 29540 STRAPPING ANKLE &/FOOT: CPT | Mod: RT

## 2025-03-11 PROCEDURE — 29580 STRAPPING UNNA BOOT: CPT | Mod: RT,59

## 2025-03-11 PROCEDURE — 29580 STRAPPING UNNA BOOT: CPT | Mod: RT

## 2025-03-17 ENCOUNTER — APPOINTMENT (OUTPATIENT)
Dept: PODIATRY | Facility: CLINIC | Age: 75
End: 2025-03-17
Payer: MEDICARE

## 2025-03-17 ENCOUNTER — OUTPATIENT (OUTPATIENT)
Dept: OUTPATIENT SERVICES | Facility: HOSPITAL | Age: 75
LOS: 1 days | End: 2025-03-17
Payer: MEDICARE

## 2025-03-17 DIAGNOSIS — L97.519 NON-PRESSURE CHRONIC ULCER OF OTHER PART OF RIGHT FOOT WITH UNSPECIFIED SEVERITY: ICD-10-CM

## 2025-03-17 DIAGNOSIS — Z00.00 ENCOUNTER FOR GENERAL ADULT MEDICAL EXAMINATION WITHOUT ABNORMAL FINDINGS: ICD-10-CM

## 2025-03-17 DIAGNOSIS — Z89.421 ACQUIRED ABSENCE OF OTHER RIGHT TOE(S): Chronic | ICD-10-CM

## 2025-03-17 DIAGNOSIS — E11.621 TYPE 2 DIABETES MELLITUS WITH FOOT ULCER: ICD-10-CM

## 2025-03-17 DIAGNOSIS — L97.509 TYPE 2 DIABETES MELLITUS WITH FOOT ULCER: ICD-10-CM

## 2025-03-17 DIAGNOSIS — Z89.429 ACQUIRED ABSENCE OF OTHER TOE(S), UNSPECIFIED SIDE: Chronic | ICD-10-CM

## 2025-03-17 PROCEDURE — 11042 DBRDMT SUBQ TIS 1ST 20SQCM/<: CPT | Mod: RT

## 2025-03-17 PROCEDURE — 29580 STRAPPING UNNA BOOT: CPT | Mod: RT

## 2025-03-17 PROCEDURE — 29580 STRAPPING UNNA BOOT: CPT | Mod: RT,59

## 2025-03-18 RX ORDER — TIRZEPATIDE 5 MG/.5ML
5 INJECTION, SOLUTION SUBCUTANEOUS
Qty: 3 | Refills: 0 | Status: ACTIVE | COMMUNITY
Start: 2025-03-18 | End: 1900-01-01

## 2025-03-19 ENCOUNTER — APPOINTMENT (OUTPATIENT)
Dept: CARDIOLOGY | Facility: CLINIC | Age: 75
End: 2025-03-19

## 2025-03-20 NOTE — ED ADULT TRIAGE NOTE - ARRIVAL FROM
Home Monica Goyal is a 69 year old female presenting with   Chief Complaint   Patient presents with    Office Visit     Patient states a bump on the labia that became really sore.        No chaperone needed    Denies known Latex allergy or symptoms of Latex sensitivity.  Medications verified, no changes.  Lanie Martin MD      Patient would like communication of their results via:    [employer]

## 2025-03-24 ENCOUNTER — APPOINTMENT (OUTPATIENT)
Dept: PODIATRY | Facility: CLINIC | Age: 75
End: 2025-03-24
Payer: MEDICARE

## 2025-03-24 ENCOUNTER — OUTPATIENT (OUTPATIENT)
Dept: OUTPATIENT SERVICES | Facility: HOSPITAL | Age: 75
LOS: 1 days | End: 2025-03-24
Payer: MEDICARE

## 2025-03-24 DIAGNOSIS — Z89.429 ACQUIRED ABSENCE OF OTHER TOE(S), UNSPECIFIED SIDE: Chronic | ICD-10-CM

## 2025-03-24 DIAGNOSIS — Z89.421 ACQUIRED ABSENCE OF OTHER RIGHT TOE(S): Chronic | ICD-10-CM

## 2025-03-24 DIAGNOSIS — Z00.00 ENCOUNTER FOR GENERAL ADULT MEDICAL EXAMINATION WITHOUT ABNORMAL FINDINGS: ICD-10-CM

## 2025-03-24 PROCEDURE — 29580 STRAPPING UNNA BOOT: CPT | Mod: 59,RT

## 2025-03-24 PROCEDURE — 11042 DBRDMT SUBQ TIS 1ST 20SQCM/<: CPT | Mod: RT

## 2025-03-25 DIAGNOSIS — L97.519 NON-PRESSURE CHRONIC ULCER OF OTHER PART OF RIGHT FOOT WITH UNSPECIFIED SEVERITY: ICD-10-CM
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
## 2025-03-25 DIAGNOSIS — E11.621 TYPE 2 DIABETES MELLITUS WITH FOOT ULCER: ICD-10-CM
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
## 2025-03-26 DIAGNOSIS — E11.621 TYPE 2 DIABETES MELLITUS WITH FOOT ULCER: ICD-10-CM

## 2025-03-26 DIAGNOSIS — L97.519 NON-PRESSURE CHRONIC ULCER OF OTHER PART OF RIGHT FOOT WITH UNSPECIFIED SEVERITY: ICD-10-CM

## 2025-03-31 ENCOUNTER — APPOINTMENT (OUTPATIENT)
Dept: PODIATRY | Facility: CLINIC | Age: 75
End: 2025-03-31

## 2025-04-01 NOTE — H&P PST ADULT - FAMILY HISTORY
Ms. Vania Flores is a 74 yo F with CAD (sp ERI x4), HTN, HLD, T2DM, GERD, CHB (s/p Medtronic bi-v ppm 11/2024) presents with cp radiating to jaw x 3 days    [facility] 11/20/2024  LM- 10% stenosis, LAD 10% stenosis, LCx 10% stenosis    [facility] 11/20/2024  severe prox LAD ISR s/p balloon angioplasty  LM: minor dz, LAD 99% ISR, LCx patent stents with mild ISR, RCA     [facility] 8/2024  ERI x1 to pLAD with lithotripsy    TTE 11/2024   1. Left ventricular systolic function is mildly decreased with an ejection fraction visually estimated at 45 to 50 %. Regional wall motion abnormalities present.   2. Multiple segmental abnormalities exist. See findings.   3. Normal right ventricular cavity size, with normal wall thickness, and normal right ventricular systolic function.  4. No pericardial effusion seen.    #chest pain  ekg as, . BINH score 110, MANUELA score 5. pt with risks of HTN, HLD, T2DM, and CAD with prior stents  -s/p asa 324 mg total  -trend trops  -monitor on telemetry  -daily bmp for Mg>2, K>4  -ekg and trop prn chest pain  -order lipid panel and a1c for risk stratification  -strict i/os  -daily weights  -order TTE  -pt doesn't need to be npo for possible LHC    ***incomplete   Ms. Vania Flores is a 72 yo F with CAD (sp ERI x4), HTN, HLD, T2DM, GERD, CHB (s/p Medtronic bi-v ppm 11/2024), pAF presents with cp radiating to jaw x 3 days    [facility] 11/20/2024  LM- 10% stenosis, LAD 10% stenosis, LCx 10% stenosis    [facility] 11/20/2024  severe prox LAD ISR s/p balloon angioplasty  LM: minor dz, LAD 99% ISR, LCx patent stents with mild ISR, RCA     [facility] 8/2024  ERI x1 to pLAD with lithotripsy    TTE 11/2024   1. Left ventricular systolic function is mildly decreased with an ejection fraction visually estimated at 45 to 50 %. Regional wall motion abnormalities present.   2. Multiple segmental abnormalities exist. See findings.   3. Normal right ventricular cavity size, with normal wall thickness, and normal right ventricular systolic function.  4. No pericardial effusion seen.    #chest pain  #hx CAD  ekg as, . BINH score 110, MANUELA score 5. pt with risks of HTN, HLD, T2DM, and CAD with prior stents. would achieve better bp control, sbp 170s currently. pt describes pain as tearing, feeling like her chest is "opening", would get CTA aorta to rule out aortic dissection  -order coreg 25 mg bid  -order CTA chest aorta  -s/p asa 324 mg total  -trend trops  -monitor on telemetry  -daily bmp for Mg>2, K>4  -ekg and trop prn chest pain  -order lipid panel and a1c for risk stratification  -strict i/os  -daily weights  -order TTE  -pt doesn't need to be npo for possible LHC  -plavix 75 mg qd  -imdur 120 mg qd  -ranolazine 500 mg q12h  -rosuva 40 mg qd  -losartan 25 mg qd    #pAF  -continue home eliquis 5 mg bid Ms. Vania Flores is a 72 yo F with CAD (sp ERI x4), HTN, HLD, T2DM, GERD, CHB (s/p Medtronic bi-v ppm 11/2024), pAF presents with cp radiating to jaw x 3 days    [facility] 11/20/2024  LM- 10% stenosis, LAD 10% stenosis, LCx 10% stenosis    [facility] 11/20/2024  severe prox LAD ISR s/p balloon angioplasty  LM: minor dz, LAD 99% ISR, LCx patent stents with mild ISR, RCA     [facility] 8/2024  ERI x1 to pLAD with lithotripsy    TTE 11/2024   1. Left ventricular systolic function is mildly decreased with an ejection fraction visually estimated at 45 to 50 %. Regional wall motion abnormalities present.   2. Multiple segmental abnormalities exist. See findings.   3. Normal right ventricular cavity size, with normal wall thickness, and normal right ventricular systolic function.  4. No pericardial effusion seen.    #chest pain  #hx CAD  ekg as, . BINH score 110, MANUELA score 5. pt with risks of HTN, HLD, T2DM, and CAD with prior stents. would achieve better bp control, sbp 170s currently. pt describes pain as tearing, feeling like her chest is "opening", would get CTA aorta to rule out aortic dissection  -order coreg 25 mg bid  -order CTA chest aorta  -s/p asa 324 mg total  -trend trops till peak and then stop   -monitor on telemetry  -daily bmp for Mg>2, K>4  -ekg and trop prn chest pain  -order lipid panel and a1c for risk stratification  -strict i/os  -daily weights  -order TTE  -plavix 75 mg qd  -imdur 120 mg qd  -ranolazine 500 mg q12h  -rosuva 40 mg qd  -losartan 25 mg qd    #pAF  -continue home eliquis 5 mg bid Mother  Still living? Unknown  Family history of lung cancer, Age at diagnosis: Age Unknown     Father  Still living? Unknown  Family history of ischemic heart disease (IHD), Age at diagnosis: Age Unknown

## 2025-04-02 ENCOUNTER — OUTPATIENT (OUTPATIENT)
Dept: OUTPATIENT SERVICES | Facility: HOSPITAL | Age: 75
LOS: 1 days | End: 2025-04-02
Payer: MEDICARE

## 2025-04-02 ENCOUNTER — APPOINTMENT (OUTPATIENT)
Dept: PODIATRY | Facility: CLINIC | Age: 75
End: 2025-04-02
Payer: MEDICARE
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
## 2025-04-02 DIAGNOSIS — Z00.00 ENCOUNTER FOR GENERAL ADULT MEDICAL EXAMINATION WITHOUT ABNORMAL FINDINGS: ICD-10-CM

## 2025-04-02 DIAGNOSIS — L97.519 NON-PRESSURE CHRONIC ULCER OF OTHER PART OF RIGHT FOOT WITH UNSPECIFIED SEVERITY: ICD-10-CM

## 2025-04-02 DIAGNOSIS — Z89.421 ACQUIRED ABSENCE OF OTHER RIGHT TOE(S): Chronic | ICD-10-CM

## 2025-04-02 DIAGNOSIS — E11.621 TYPE 2 DIABETES MELLITUS WITH FOOT ULCER: ICD-10-CM
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
## 2025-04-02 DIAGNOSIS — Z89.429 ACQUIRED ABSENCE OF OTHER TOE(S), UNSPECIFIED SIDE: Chronic | ICD-10-CM

## 2025-04-02 PROCEDURE — 11042 DBRDMT SUBQ TIS 1ST 20SQCM/<: CPT | Mod: RT

## 2025-04-08 ENCOUNTER — APPOINTMENT (OUTPATIENT)
Dept: PODIATRY | Facility: CLINIC | Age: 75
End: 2025-04-08
Payer: MEDICARE

## 2025-04-08 ENCOUNTER — OUTPATIENT (OUTPATIENT)
Dept: OUTPATIENT SERVICES | Facility: HOSPITAL | Age: 75
LOS: 1 days | End: 2025-04-08
Payer: MEDICARE

## 2025-04-08 DIAGNOSIS — Z89.429 ACQUIRED ABSENCE OF OTHER TOE(S), UNSPECIFIED SIDE: Chronic | ICD-10-CM

## 2025-04-08 DIAGNOSIS — Z89.421 ACQUIRED ABSENCE OF OTHER RIGHT TOE(S): Chronic | ICD-10-CM

## 2025-04-08 DIAGNOSIS — Z00.00 ENCOUNTER FOR GENERAL ADULT MEDICAL EXAMINATION WITHOUT ABNORMAL FINDINGS: ICD-10-CM

## 2025-04-08 PROCEDURE — 11042 DBRDMT SUBQ TIS 1ST 20SQCM/<: CPT | Mod: RT,GC

## 2025-04-08 PROCEDURE — 11042 DBRDMT SUBQ TIS 1ST 20SQCM/<: CPT | Mod: RT
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
## 2025-04-10 DIAGNOSIS — E11.621 TYPE 2 DIABETES MELLITUS WITH FOOT ULCER: ICD-10-CM

## 2025-04-10 DIAGNOSIS — L97.519 NON-PRESSURE CHRONIC ULCER OF OTHER PART OF RIGHT FOOT WITH UNSPECIFIED SEVERITY: ICD-10-CM
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
## 2025-04-14 ENCOUNTER — APPOINTMENT (OUTPATIENT)
Dept: PODIATRY | Facility: CLINIC | Age: 75
End: 2025-04-14
Payer: MEDICARE

## 2025-04-14 ENCOUNTER — OUTPATIENT (OUTPATIENT)
Dept: OUTPATIENT SERVICES | Facility: HOSPITAL | Age: 75
LOS: 1 days | End: 2025-04-14
Payer: MEDICARE

## 2025-04-14 DIAGNOSIS — L97.519 NON-PRESSURE CHRONIC ULCER OF OTHER PART OF RIGHT FOOT WITH UNSPECIFIED SEVERITY: ICD-10-CM
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
## 2025-04-14 DIAGNOSIS — Z89.429 ACQUIRED ABSENCE OF OTHER TOE(S), UNSPECIFIED SIDE: Chronic | ICD-10-CM
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
## 2025-04-14 DIAGNOSIS — E11.621 TYPE 2 DIABETES MELLITUS WITH FOOT ULCER: ICD-10-CM

## 2025-04-14 DIAGNOSIS — Z89.421 ACQUIRED ABSENCE OF OTHER RIGHT TOE(S): Chronic | ICD-10-CM

## 2025-04-14 DIAGNOSIS — Z00.00 ENCOUNTER FOR GENERAL ADULT MEDICAL EXAMINATION WITHOUT ABNORMAL FINDINGS: ICD-10-CM

## 2025-04-14 DIAGNOSIS — L97.509 TYPE 2 DIABETES MELLITUS WITH FOOT ULCER: ICD-10-CM

## 2025-04-14 PROCEDURE — 11042 DBRDMT SUBQ TIS 1ST 20SQCM/<: CPT | Mod: RT

## 2025-04-14 PROCEDURE — 29580 STRAPPING UNNA BOOT: CPT | Mod: 59,RT

## 2025-04-14 PROCEDURE — 29580 STRAPPING UNNA BOOT: CPT | Mod: 59

## 2025-04-14 NOTE — PATIENT PROFILE ADULT - NSPROMEDSADMININFO_GEN_A_NUR
[FreeTextEntry1] : Problems: 1. Primary hypothyroidism 2.  Multinodular goiter 3. Osteoporosis  Primary hypothyroidism/Thyroid nodules 1. Diagnosed with primary hypothyroidism in 1990s. She also has a multinodular goiter.  Patient never had thyroid surgery/radiation/radioactive iodine therapy 2. Radiology: A. 10/16/2023 - US thyroid - Right Lobe: 6.3 cm x 2.2 cm x 2.7 cm. In the upper pole region a 1.2 x 0.8 x 1.2 cm hypoechoic nodule and a 1.2 x 0.6 x 1.1 cm nodule seen. In the midpole region a 1.7 x 0.8 x 1.4 cm  nodule is seen. In the mid to lower pole region a 2.6 x 1.5 x 2.1 cm hypoechoic nodule with areas of cystic degeneration within is noted. In the lower pole region a 0.7 x 0.5 x 0.5 cm curvilinear echogenic focus with acoustical shadowing is noted suggestive of a calcified nodule. Left Lobe: 6.5 cm x 2.2 cm x 2.7 cm. In the upper pole region a 1.1 x 0.6 x 0.9 cm heterogeneous nodule is seen. In the midpole region a 1.6 x 0.6 x 1.6 cm heterogeneous nodule is seen. Central and peripheral vascularity is associated with this nodule. In the midpole region a 1.4 x 1.0 x 1.6 cm hypoechoic nodule with an area of cystic degeneration within is noted. In the lower pole region a 2.1 x 1.6 x 1.6 cm hypoechoic nodule is seen. Also noted in the lower pole region is a 1.9 x 1.6 x 1.4 cm hypoechoic nodule with an area of cystic degeneration within. Isthmus: 5 mm. Inferior to the isthmus a 1.6 x 1.3 x 1.6 cm heterogeneous nodule with associated central vascularity is noted. B. November 2023 - US guided FNA of the right upper pole and left midpole nodule - cytology benign C. July 2024 - US thyroid - right lobe measures 6.5 cm and left lobe measures 6.6 cm - per the radiology report, the thyroid nodules when compared to that from October 2023, the patient's thyroid nodules remained stable in size or decreased in size.  3. No family history of thyroid disorders or thyroid cancer, no personal of radiation treatment 4. Compression symptom - no dysphagia, no SOB on lying flat 5. Meds: Levothyroxine (generic) 25 micrograms po every other day  - fully compliant and patient advised on the appropriate use of levothyroxine   Osteoporosis 1. Patient was diagnosed with osteoporosis in 1990s 2. Radiology: 10/16/2023 - DXA scan - Hologic - L1 to L4 - T score neg 1.9 with BMD of 0.839, Left femoral neck - T score neg 2  with BMD of 0.631 3. Labs: October 2023 - Cr N, corrected calcium N July 2024 - SPEP neg, Cr N, corrected calcium N, PTH N, serum phosphorous N, 25 OH vitamin D 56.9 4. Patient never had a fracture, parents never fractured hip, no current glucocorticoid use, patient does not smoke, she occasionally drinks alcohol.  5. Vitamin D and calcium intake On vitamin D 2000 units po daily On calcium 500 mg po bid Patient uses 1 cup of milk per day, eats two slices of cheese per week, eats one cup of ice-cream and one cup of yogurt twice per week 6. Meds: Patient used Risendronate 150 mg po once per month from 2004 to 05/10/2024  - I discontinued this on 05/10/2024 as she had been on bisphosphonates for more than 10 years. Prolia was prescribed in 2024.  First prolia injection administered on 10/10/2024. 
no concerns

## 2025-04-15 DIAGNOSIS — L97.519 NON-PRESSURE CHRONIC ULCER OF OTHER PART OF RIGHT FOOT WITH UNSPECIFIED SEVERITY: ICD-10-CM
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
## 2025-04-15 DIAGNOSIS — E11.621 TYPE 2 DIABETES MELLITUS WITH FOOT ULCER: ICD-10-CM
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
## 2025-04-24 ENCOUNTER — APPOINTMENT (OUTPATIENT)
Dept: PODIATRY | Facility: CLINIC | Age: 75
End: 2025-04-24
Payer: MEDICARE

## 2025-04-24 ENCOUNTER — OUTPATIENT (OUTPATIENT)
Dept: OUTPATIENT SERVICES | Facility: HOSPITAL | Age: 75
LOS: 1 days | End: 2025-04-24
Payer: MEDICARE

## 2025-04-24 ENCOUNTER — APPOINTMENT (OUTPATIENT)
Dept: ELECTROPHYSIOLOGY | Facility: HOSPITAL | Age: 75
End: 2025-04-24

## 2025-04-24 DIAGNOSIS — L97.519 NON-PRESSURE CHRONIC ULCER OF OTHER PART OF RIGHT FOOT WITH UNSPECIFIED SEVERITY: ICD-10-CM

## 2025-04-24 DIAGNOSIS — E11.621 TYPE 2 DIABETES MELLITUS WITH FOOT ULCER: ICD-10-CM

## 2025-04-24 DIAGNOSIS — Z89.429 ACQUIRED ABSENCE OF OTHER TOE(S), UNSPECIFIED SIDE: Chronic | ICD-10-CM

## 2025-04-24 DIAGNOSIS — L97.509 TYPE 2 DIABETES MELLITUS WITH FOOT ULCER: ICD-10-CM

## 2025-04-24 DIAGNOSIS — Z89.421 ACQUIRED ABSENCE OF OTHER RIGHT TOE(S): Chronic | ICD-10-CM

## 2025-04-24 DIAGNOSIS — Z00.00 ENCOUNTER FOR GENERAL ADULT MEDICAL EXAMINATION WITHOUT ABNORMAL FINDINGS: ICD-10-CM

## 2025-04-24 PROCEDURE — 11042 DBRDMT SUBQ TIS 1ST 20SQCM/<: CPT | Mod: 59

## 2025-04-24 PROCEDURE — 29445 APPL RIGID TOT CNTC LEG CAST: CPT | Mod: RT

## 2025-05-01 ENCOUNTER — OUTPATIENT (OUTPATIENT)
Dept: OUTPATIENT SERVICES | Facility: HOSPITAL | Age: 75
LOS: 1 days | End: 2025-05-01
Payer: MEDICARE

## 2025-05-01 ENCOUNTER — APPOINTMENT (OUTPATIENT)
Dept: PODIATRY | Facility: CLINIC | Age: 75
End: 2025-05-01
Payer: MEDICARE

## 2025-05-01 DIAGNOSIS — Z89.421 ACQUIRED ABSENCE OF OTHER RIGHT TOE(S): Chronic | ICD-10-CM

## 2025-05-01 DIAGNOSIS — L97.519 NON-PRESSURE CHRONIC ULCER OF OTHER PART OF RIGHT FOOT WITH UNSPECIFIED SEVERITY: ICD-10-CM

## 2025-05-01 DIAGNOSIS — Z00.00 ENCOUNTER FOR GENERAL ADULT MEDICAL EXAMINATION WITHOUT ABNORMAL FINDINGS: ICD-10-CM

## 2025-05-01 DIAGNOSIS — Z89.429 ACQUIRED ABSENCE OF OTHER TOE(S), UNSPECIFIED SIDE: Chronic | ICD-10-CM

## 2025-05-01 PROCEDURE — 11042 DBRDMT SUBQ TIS 1ST 20SQCM/<: CPT | Mod: 59

## 2025-05-01 PROCEDURE — 29445 APPL RIGID TOT CNTC LEG CAST: CPT | Mod: RT

## 2025-05-01 PROCEDURE — 11042 DBRDMT SUBQ TIS 1ST 20SQCM/<: CPT | Mod: RT

## 2025-05-05 DIAGNOSIS — L97.519 NON-PRESSURE CHRONIC ULCER OF OTHER PART OF RIGHT FOOT WITH UNSPECIFIED SEVERITY: ICD-10-CM
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
## 2025-05-05 DIAGNOSIS — E11.621 TYPE 2 DIABETES MELLITUS WITH FOOT ULCER: ICD-10-CM

## 2025-05-08 ENCOUNTER — APPOINTMENT (OUTPATIENT)
Dept: PODIATRY | Facility: CLINIC | Age: 75
End: 2025-05-08
Payer: MEDICARE

## 2025-05-08 ENCOUNTER — OUTPATIENT (OUTPATIENT)
Dept: OUTPATIENT SERVICES | Facility: HOSPITAL | Age: 75
LOS: 1 days | End: 2025-05-08
Payer: MEDICARE

## 2025-05-08 DIAGNOSIS — Z89.421 ACQUIRED ABSENCE OF OTHER RIGHT TOE(S): Chronic | ICD-10-CM

## 2025-05-08 DIAGNOSIS — L97.519 NON-PRESSURE CHRONIC ULCER OF OTHER PART OF RIGHT FOOT WITH UNSPECIFIED SEVERITY: ICD-10-CM

## 2025-05-08 DIAGNOSIS — Z89.429 ACQUIRED ABSENCE OF OTHER TOE(S), UNSPECIFIED SIDE: Chronic | ICD-10-CM
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
## 2025-05-08 DIAGNOSIS — E11.621 TYPE 2 DIABETES MELLITUS WITH FOOT ULCER: ICD-10-CM

## 2025-05-08 DIAGNOSIS — Z00.00 ENCOUNTER FOR GENERAL ADULT MEDICAL EXAMINATION WITHOUT ABNORMAL FINDINGS: ICD-10-CM

## 2025-05-08 DIAGNOSIS — L97.509 TYPE 2 DIABETES MELLITUS WITH FOOT ULCER: ICD-10-CM
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
## 2025-05-08 PROCEDURE — 11042 DBRDMT SUBQ TIS 1ST 20SQCM/<: CPT | Mod: RT

## 2025-05-14 ENCOUNTER — APPOINTMENT (OUTPATIENT)
Dept: ENDOCRINOLOGY | Facility: CLINIC | Age: 75
End: 2025-05-14

## 2025-05-15 ENCOUNTER — APPOINTMENT (OUTPATIENT)
Dept: PODIATRY | Facility: CLINIC | Age: 75
End: 2025-05-15
Payer: MEDICARE

## 2025-05-15 ENCOUNTER — OUTPATIENT (OUTPATIENT)
Dept: OUTPATIENT SERVICES | Facility: HOSPITAL | Age: 75
LOS: 1 days | End: 2025-05-15
Payer: MEDICARE

## 2025-05-15 DIAGNOSIS — L97.519 NON-PRESSURE CHRONIC ULCER OF OTHER PART OF RIGHT FOOT WITH UNSPECIFIED SEVERITY: ICD-10-CM

## 2025-05-15 DIAGNOSIS — Z00.00 ENCOUNTER FOR GENERAL ADULT MEDICAL EXAMINATION WITHOUT ABNORMAL FINDINGS: ICD-10-CM

## 2025-05-15 DIAGNOSIS — L97.509 TYPE 2 DIABETES MELLITUS WITH FOOT ULCER: ICD-10-CM

## 2025-05-15 DIAGNOSIS — E11.621 TYPE 2 DIABETES MELLITUS WITH FOOT ULCER: ICD-10-CM
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
## 2025-05-15 DIAGNOSIS — Z89.421 ACQUIRED ABSENCE OF OTHER RIGHT TOE(S): Chronic | ICD-10-CM
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
## 2025-05-15 PROCEDURE — 11042 DBRDMT SUBQ TIS 1ST 20SQCM/<: CPT | Mod: RT

## 2025-05-16 NOTE — H&P PST ADULT - NS PRO REFERRAL CMGT
05/15/25 1312   Wound 01/14/25 Toe (Comment  which one) Right #1 3rd toe   Date First Assessed/Time First Assessed: 01/14/25 1038   Present on Original Admission: Yes  Wound Approximate Age at First Assessment (Weeks): 50 weeks  Primary Wound Type: Diabetic Ulcer  Location: (c) Toe (Comment  which one)  Wound Location [place]...   Wound Image     Wound Etiology Diabetic Mota 3   Dressing Status Old drainage noted   Wound Cleansed Cleansed with saline   Dressing/Treatment Xeroform   Offloading for Diabetic Foot Ulcers Offloading boot   Wound Length (cm) 1.2 cm   Wound Width (cm) 0.5 cm   Wound Depth (cm) 0.1 cm   Wound Surface Area (cm^2) 0.6 cm^2   Change in Wound Size % (l*w) -566.67   Wound Volume (cm^3) 0.06 cm^3   Wound Healing % -122   Post-Procedure Length (cm) 1.2 cm   Post-Procedure Width (cm) 0.6 cm   Post-Procedure Depth (cm) 0.3 cm   Post-Procedure Surface Area (cm^2) 0.72 cm^2   Post-Procedure Volume (cm^3) 0.216 cm^3   Wound Assessment Eschar dry   Drainage Amount Small (< 25%)   Drainage Description Serosanguinous   Odor None   Jennifer-wound Assessment Edematous   Wound Thickness Description not for Pressure Injury Full thickness   Pain Assessment   Pain Assessment None - Denies Pain     Pre- and post-debridement. Patient is currently taking Eliquis   None

## 2025-05-21 ENCOUNTER — APPOINTMENT (OUTPATIENT)
Dept: ELECTROPHYSIOLOGY | Facility: HOSPITAL | Age: 75
End: 2025-05-21

## 2025-05-21 ENCOUNTER — TRANSCRIPTION ENCOUNTER (OUTPATIENT)
Age: 75
End: 2025-05-21

## 2025-05-21 ENCOUNTER — OUTPATIENT (OUTPATIENT)
Dept: OUTPATIENT SERVICES | Facility: HOSPITAL | Age: 75
LOS: 1 days | Discharge: HOME | End: 2025-05-21
Payer: MEDICARE

## 2025-05-21 DIAGNOSIS — Z89.429 ACQUIRED ABSENCE OF OTHER TOE(S), UNSPECIFIED SIDE: Chronic | ICD-10-CM

## 2025-05-21 DIAGNOSIS — Z89.421 ACQUIRED ABSENCE OF OTHER RIGHT TOE(S): Chronic | ICD-10-CM

## 2025-05-21 PROCEDURE — ZZZZZ: CPT

## 2025-05-21 PROCEDURE — 33286 RMVL SUBQ CAR RHYTHM MNTR: CPT

## 2025-05-21 RX ORDER — CEPHALEXIN 250 MG/1
500 CAPSULE ORAL ONCE
Refills: 0 | Status: COMPLETED | OUTPATIENT
Start: 2025-05-21 | End: 2025-05-21

## 2025-05-21 RX ADMIN — CEPHALEXIN 500 MILLIGRAM(S): 250 CAPSULE ORAL at 08:37

## 2025-05-21 NOTE — H&P ADULT - NSHPPHYSICALEXAM_GEN_ALL_CORE
CONSTITUTIONAL: Well groomed, no apparent distress  RESP: No respiratory distress, no use of accessory muscles; CTA b/l  CV: RRR, +S1S2, no MRG; no JVD; no peripheral edema  MSK Normal ROM without pain, no spinal tenderness, normal muscle strength/tone  PSYCH: Appropriate insight/judgment; A+O x 3, mood and affect appropriate, recent/remote memory intact

## 2025-05-21 NOTE — H&P ADULT - HISTORY OF PRESENT ILLNESS
Mr. Tariq Ceja is a pleasant 75-year-old man with hypertension, hyperlipidemia, diabetes mellitus, depression, GERD, admitted to [facility] in September 2021 for cryptogenic stroke. An ILR implant was recommended for long term detection of arrhythmias as a cause of his symptoms. Patient underwent implant of ILR on 9/8/2021. ILR reached EOS 2/20/2025. Cha2 DS2 Vasc score of at least 6 (age1, stroke2, HTN, DM)  Patient presents to EP lab today for ILR explant procedure. Procedure was discussed: risks such as infection, bleeding. Ample time was provided for questions/answers.

## 2025-05-21 NOTE — ASU DISCHARGE PLAN (ADULT/PEDIATRIC) - CARE PROVIDER_API CALL
Te Rocha  Cardiac Electrophysiology  [address] [place] [address], [address]  [place] [address]  Phone: [phone]  Fax: [phone]  Follow Up Time:

## 2025-05-21 NOTE — ASU DISCHARGE PLAN (ADULT/PEDIATRIC) - FINANCIAL ASSISTANCE
[facility] provides services at a reduced cost to those who are determined to be eligible through [facility]’s financial assistance program. Information regarding [facility]’s financial assistance program can be found by going to [email or web address] or by calling [phone].

## 2025-05-21 NOTE — H&P ADULT - ASSESSMENT
Mr. Tariq Ceja is a pleasant 75-year-old man with hypertension, hyperlipidemia, diabetes mellitus, depression, GERD, admitted to [facility] in September 2021 for cryptogenic stroke. An ILR implant was recommended for long term detection of arrhythmias as a cause of his symptoms. Patient underwent implant of ILR on 9/8/2021. ILR reached EOS 2/20/2025. Cha2 DS2 Vasc score of at least 6 (age1, stroke2, HTN, DM)  Patient presents to EP lab today for ILR explant procedure. Procedure was discussed: risks such as infection, bleeding. Ample time was provided for questions/answers.     - loop recorder explant today  - Keflex 500 mg x 1 dose ordered  - consent is obtained  - d/c home post procedure  - post op appt in 1 month for a wound check

## 2025-05-21 NOTE — ASU DISCHARGE PLAN (ADULT/PEDIATRIC) - ASU DC SPECIAL INSTRUCTIONSFT
Please keep the bandage intact and dry for 2 days. Please do sponge bath meantime.   You can remove it on 5/23/25. You can take a shower on 5/23/25 after the bandage removal.   Please avoid submerging under the water for 1 mon.   Please keep steri strips intact and let them to fall off on their own in 1-2 weeks.   Follow up appointment will be arranged in 1 mon for a wound check.   Continue all home medications.

## 2025-05-21 NOTE — ASU DISCHARGE PLAN (ADULT/PEDIATRIC) - DISCHARGE PLAN IS COMPLETE AND GIVEN TO PATIENT
03/28/23 0736   Encounter Summary   Encounter Overview/Reason  Advance Care Planning   Service Provided For: Patient   Referral/Consult From: Nurse   Support System Family members   Last Encounter  03/28/23  (Advanced Directive completed 7:37am)   Complexity of Encounter Moderate   Begin Time 0640   End Time  0738   Total Time Calculated 58 min   Encounter    Type Initial Screen/Assessment   Spiritual/Emotional needs   Type Spiritual Support   Advance Care Planning   Type Completed AD/ACP document(s)   Assessment/Intervention/Outcome   Assessment Hopeful;Peaceful   Intervention Active listening;Discussed belief system/Advent practices/varinder;Discussed illness injury and its impact; Discussed relationship with God;Life review/Legacy;Prayer (assurance of)/[place];Sustaining Presence/Ministry of presence   Outcome Encouraged;Engaged in conversation;Expressed Gratitude   Plan and Referrals   Plan/Referrals Continue Support (comment)  (as needed) Hospitalist Progress Note      Name:  Glendy Gayle /Age/Sex: 1959  (70 y.o. male)   MRN & CSN:  [id number] & [id number] Admission Date/Time: 3/24/2023  9:12 PM   Location:  -A PCP: Claudia Pendleton MD         Hospital Day: 4    Assessment and Plan:   Glendy Gayle is a 70 y.o.  male with past medical history of chronic HFpEF, paroxysmal atrial fibrillation s/p ablation May 2018 on chronic anticoagulation, hypertension, hyperlipidemia, uncontrolled diabetes mellitus type 2 with last hemoglobin A1c 9.3 in 2023, morbid obesity BMI 44.14, COPD, peripheral vascular disease status post left femoral popliteal bypass, came to the emergency room 3/24/2023 for evaluation of worsening shortness of breath. Diagnosed with COPD exacerbation. Unfortunately patient continues to smoke. Assessment    Acute on chronic hypoxic/hypercapnic respiratory failure secondary to exacerbation of severe COPD  Sinus tachycardia-resolved  Uncontrolled diabetes mellitus type 2 with hyperglycemia, likely secondary to steroids  Nicotine dependence  Lactic acidosis-resolved  Chronic HFpEF-compensated  Paroxysmal atrial fibrillation s/p ablation  Peripheral vascular disease s/p left femoral-popliteal bypass  Morbid obesity with BMI 44.14    Plan    Currently requiring supplemental oxygen via nasal cannula at 3 L/min. Baseline oxygen requirement 2 to 3 L/min  Continue azithromycin/DuoNeb/Symbicort  D/C IV Solu-Medrol. Start PO prednisone x 3 days ( stop tomorrow). Smoking cessation strongly advised. Noted to have brief episode of sinus tachycardia on telemetry. Subsequent EKG did not reveal any acute ST-T changes. Showed PACs. Continue aspirin/Cardizem CD/Lasix/Eliquis/Lipitor  Blood sugar better controlled. Endocrinology consulted. Continue Lantus to 50 units subcu nightly. Increase prandial insulin to 20 units 3 times daily with meals. Continue insulin sliding scale.   Hypoglycemia Outpatient Pharmacy Progress Note for Meds-to-Beds    Total number of Prescriptions Filled: 4  The following medications were dispensed to the patient during the discharge process:  Metformin  Levemir insulin pen  Prednisone  Insulin pen needles    Additional Documentation:  Medications given to nurse Rasta Ledesma to provide to patient  Med Assist was able to help cover the cost of the medications to provide patient with a $0.00 co-pay. Thank you for letting us serve your patients.   [address]\""    [address], [address] [facility]    Phone: [phone]    Fax: [phone] Pt educated on diet ordered. Pt accepted education but still does not want to abide by the diet. Attending provider notified. Pt hr NSR tachy up to 140-150's sustained for 3-5 mins. Pt asymptomatic. RN check leads ands and checked manual pulse, consistent with monitor. BP checked 107/82. Contacted Dr Mono Garg. Dr Mono Garg gave vo order for EKG. Order placed and completed. EKG given to Dr Mono Garg for reading. No further orders at this time. Pt hr now back down to 70's-80's. insulin lispro  20 Units SubCUTAneous TID WC    metFORMIN  1,000 mg Oral BID WC    glipiZIDE  10 mg Oral BID AC    ipratropium-albuterol  1 ampule Inhalation BID    insulin NPH  30 Units SubCUTAneous QAM    insulin lispro  0-16 Units SubCUTAneous TID WC    insulin lispro  0-16 Units SubCUTAneous 2 times per day    insulin glargine  50 Units SubCUTAneous Nightly    predniSONE  40 mg Oral Daily    apixaban  5 mg Oral BID    aspirin  81 mg Oral Daily    atorvastatin  40 mg Oral Daily    dilTIAZem  120 mg Oral Daily    furosemide  40 mg Oral BID    budesonide-formoterol  2 puff Inhalation BID    sodium chloride flush  5-40 mL IntraVENous 2 times per day    polyethylene glycol  17 g Oral Daily    sodium chloride flush  5-40 mL IntraVENous BID      Infusions:    sodium chloride      dextrose           Objective:   Vitals: BP (!) 136/90   Pulse 81   Temp 98.5 °F (36.9 °C) (Oral)   Resp 22   Ht 6' 6\" (1.981 m)   Wt (!) 368 lb (166.9 kg)   SpO2 93%   BMI 42.53 kg/m²   General appearance: alert and cooperative with exam  Neck: no JVD or bruit  Thyroid : Normal lobes   Lungs: Has Vesicular Breath sounds   Heart:  regular rate and rhythm  Abdomen: soft, non-tender; bowel sounds normal; no masses,  no organomegaly  Musculoskeletal: Normal  Extremities: extremities normal, , no edema  Neurologic:  Awake, alert, oriented to name, place and time. Cranial nerves II-XII are grossly intact. Motor is  intact. Sensory is intact. ,  and gait is normal.    Assessment:     Patient Active Problem List:     Hypercholesteremia     Tobacco use     Anxiety     Controlled type 2 diabetes mellitus without complication, without long-term current use of insulin ([facility])     Benign essential HTN     Simple chronic bronchitis (HCC)     Macular degeneration, dry     PVD (peripheral vascular disease) ([facility])     VT (ventricular tachycardia)     Left wrist sprain     Hypoxia     Morbid obesity ([address]     History of atrial fibrillation     Swelling of ablation of atrial fibrillation     LFT elevation     Cholecystitis     COPD, severe (HCC)     Type 2 diabetes mellitus with hyperglycemia     COVID-19     COPD exacerbation ([facility] [address]      Plan:     Reviewed POC blood glucose . Labs and X ray results   Reviewed Current Medicines   On meal/ Correction bolus Humalog/ Basal Lantus Insulin regime / and Oral Hypoglycemic drugs   Monitor Blood glucose frequently   Modified  the dose of Insulin/ other medicines as needed   Will follow     .      Julia Garcia MD, MD dextrose       PRN Meds: sodium chloride flush, 5-40 mL, PRN  sodium chloride, , PRN  potassium chloride, 10 mEq, PRN  magnesium sulfate, 2,000 mg, PRN  ondansetron, 4 mg, Q8H PRN   Or  ondansetron, 4 mg, Q6H PRN  nicotine, 1 patch, Daily PRN  acetaminophen, 650 mg, Q6H PRN   Or  acetaminophen, 650 mg, Q6H PRN  guaiFENesin, 600 mg, BID PRN  benzonatate, 100 mg, TID PRN  glucose, 4 tablet, PRN  dextrose bolus, 125 mL, PRN   Or  dextrose bolus, 250 mL, PRN  glucagon (rDNA), 1 mg, PRN  dextrose, , Continuous PRN          Electronically signed by Jerrell Noland MD on 3/25/2023 at 12:20 PM mg Oral BID    budesonide-formoterol  2 puff Inhalation BID    sodium chloride flush  5-40 mL IntraVENous 2 times per day    polyethylene glycol  17 g Oral Daily    azithromycin  500 mg IntraVENous Q24H    sodium chloride flush  5-40 mL IntraVENous BID      Infusions:    sodium chloride      dextrose       PRN Meds: sodium chloride flush, 5-40 mL, PRN  sodium chloride, , PRN  potassium chloride, 10 mEq, PRN  magnesium sulfate, 2,000 mg, PRN  ondansetron, 4 mg, Q8H PRN   Or  ondansetron, 4 mg, Q6H PRN  nicotine, 1 patch, Daily PRN  acetaminophen, 650 mg, Q6H PRN   Or  acetaminophen, 650 mg, Q6H PRN  guaiFENesin, 600 mg, BID PRN  benzonatate, 100 mg, TID PRN  glucose, 4 tablet, PRN  dextrose bolus, 125 mL, PRN   Or  dextrose bolus, 250 mL, PRN  glucagon (rDNA), 1 mg, PRN  dextrose, , Continuous PRN        Electronically signed by Alicia Patel MD on 3/26/2023 at 10:41 AM : Yes

## 2025-05-21 NOTE — PROGRESS NOTE ADULT - SUBJECTIVE AND OBJECTIVE BOX
Electrophysiology Brief Post-Op Note    I have personally seen and examined the patient.  I agree with the history and physical which I have reviewed and noted any changes below.  05-21-25 @ 08:33    PRE-OP DIAGNOSIS: Cryptogenic CVA  POST-OP DIAGNOSIS: Cryptogenic CVA    PROCEDURE: Loop recorder explant    Physician: Dr. Rocha  Physician Assistant: MEGHNA Dueñas    ESTIMATED BLOOD LOSS:  2    mL    ANESTHESIA TYPE:  [  ]General Anesthesia  [  ] Sedation  [X  ] Local/Regional    CONDITION  [  ] Critical  [  ] Serious  [  ]Fair  [ X ]Good    SPECIMENS REMOVED (IF APPLICABLE):  yes    IMPLANTS (IF APPLICABLE)  none    FINDINGS  PLAN OF CARE  - May remove bandaid in 2 days  - May shower in 2 days  Follow up in EP office in 1 mon for a wound check                     Electrophysiology Brief Post-Op Note    I have personally seen and examined the patient.  I agree with the history and physical which I have reviewed and noted any changes below.  05-21-25 @ 08:33    PRE-OP DIAGNOSIS: Cryptogenic CVA  POST-OP DIAGNOSIS: Cryptogenic CVA    PROCEDURE: Loop recorder explant S/N: [id number]    Physician: Dr. Rocha  Physician Assistant: MEGHNA Dueñas    ESTIMATED BLOOD LOSS:  2    mL    ANESTHESIA TYPE:  [  ]General Anesthesia  [  ] Sedation  [X  ] Local/Regional    CONDITION  [  ] Critical  [  ] Serious  [  ]Fair  [ X ]Good    SPECIMENS REMOVED (IF APPLICABLE):  yes    IMPLANTS (IF APPLICABLE)  none    FINDINGS  PLAN OF CARE  - May remove band aid in 2 days  - May shower in 2 days  Follow up in EP office in 1 mon for a wound check
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
## 2025-05-22 DIAGNOSIS — L97.519 NON-PRESSURE CHRONIC ULCER OF OTHER PART OF RIGHT FOOT WITH UNSPECIFIED SEVERITY: ICD-10-CM
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
## 2025-05-22 DIAGNOSIS — E11.621 TYPE 2 DIABETES MELLITUS WITH FOOT ULCER: ICD-10-CM

## 2025-05-23 DIAGNOSIS — Z45.09 ENCOUNTER FOR ADJUSTMENT AND MANAGEMENT OF OTHER CARDIAC DEVICE: ICD-10-CM

## 2025-05-23 DIAGNOSIS — E11.40 TYPE 2 DIABETES MELLITUS WITH DIABETIC NEUROPATHY, UNSPECIFIED: ICD-10-CM

## 2025-05-23 DIAGNOSIS — Z79.4 LONG TERM (CURRENT) USE OF INSULIN: ICD-10-CM

## 2025-05-23 DIAGNOSIS — F32.A DEPRESSION, UNSPECIFIED: ICD-10-CM

## 2025-05-23 DIAGNOSIS — K21.9 GASTRO-ESOPHAGEAL REFLUX DISEASE WITHOUT ESOPHAGITIS: ICD-10-CM

## 2025-05-23 DIAGNOSIS — E11.9 TYPE 2 DIABETES MELLITUS WITHOUT COMPLICATIONS: ICD-10-CM

## 2025-05-23 DIAGNOSIS — Z79.02 LONG TERM (CURRENT) USE OF ANTITHROMBOTICS/ANTIPLATELETS: ICD-10-CM

## 2025-05-23 DIAGNOSIS — I10 ESSENTIAL (PRIMARY) HYPERTENSION: ICD-10-CM

## 2025-05-23 DIAGNOSIS — Z79.82 LONG TERM (CURRENT) USE OF ASPIRIN: ICD-10-CM

## 2025-05-23 DIAGNOSIS — Z86.73 PERSONAL HISTORY OF TRANSIENT ISCHEMIC ATTACK (TIA), AND CEREBRAL INFARCTION WITHOUT RESIDUAL DEFICITS: ICD-10-CM

## 2025-05-23 DIAGNOSIS — E78.5 HYPERLIPIDEMIA, UNSPECIFIED: ICD-10-CM

## 2025-05-23 DIAGNOSIS — Z79.84 LONG TERM (CURRENT) USE OF ORAL HYPOGLYCEMIC DRUGS: ICD-10-CM

## 2025-05-27 ENCOUNTER — APPOINTMENT (OUTPATIENT)
Dept: PODIATRY | Facility: CLINIC | Age: 75
End: 2025-05-27
Payer: MEDICARE

## 2025-05-27 ENCOUNTER — OUTPATIENT (OUTPATIENT)
Dept: OUTPATIENT SERVICES | Facility: HOSPITAL | Age: 75
LOS: 1 days | End: 2025-05-27
Payer: MEDICARE

## 2025-05-27 DIAGNOSIS — Z89.429 ACQUIRED ABSENCE OF OTHER TOE(S), UNSPECIFIED SIDE: Chronic | ICD-10-CM

## 2025-05-27 DIAGNOSIS — Z00.00 ENCOUNTER FOR GENERAL ADULT MEDICAL EXAMINATION WITHOUT ABNORMAL FINDINGS: ICD-10-CM

## 2025-05-27 DIAGNOSIS — Z89.421 ACQUIRED ABSENCE OF OTHER RIGHT TOE(S): Chronic | ICD-10-CM

## 2025-05-27 DIAGNOSIS — Z89.419 ACQUIRED ABSENCE OF UNSPECIFIED GREAT TOE: ICD-10-CM

## 2025-05-27 DIAGNOSIS — E11.40 TYPE 2 DIABETES MELLITUS WITH DIABETIC NEUROPATHY, UNSPECIFIED: ICD-10-CM

## 2025-05-27 PROCEDURE — 99213 OFFICE O/P EST LOW 20 MIN: CPT

## 2025-06-03 ENCOUNTER — OUTPATIENT (OUTPATIENT)
Dept: OUTPATIENT SERVICES | Facility: HOSPITAL | Age: 75
LOS: 1 days | End: 2025-06-03
Payer: MEDICARE

## 2025-06-03 ENCOUNTER — APPOINTMENT (OUTPATIENT)
Dept: PODIATRY | Facility: CLINIC | Age: 75
End: 2025-06-03
Payer: MEDICARE

## 2025-06-03 DIAGNOSIS — Z89.429 ACQUIRED ABSENCE OF OTHER TOE(S), UNSPECIFIED SIDE: Chronic | ICD-10-CM

## 2025-06-03 DIAGNOSIS — Z89.421 ACQUIRED ABSENCE OF OTHER RIGHT TOE(S): Chronic | ICD-10-CM

## 2025-06-03 DIAGNOSIS — L97.519 NON-PRESSURE CHRONIC ULCER OF OTHER PART OF RIGHT FOOT WITH UNSPECIFIED SEVERITY: ICD-10-CM

## 2025-06-03 DIAGNOSIS — Z00.00 ENCOUNTER FOR GENERAL ADULT MEDICAL EXAMINATION WITHOUT ABNORMAL FINDINGS: ICD-10-CM

## 2025-06-03 PROCEDURE — 11042 DBRDMT SUBQ TIS 1ST 20SQCM/<: CPT | Mod: RT

## 2025-06-10 DIAGNOSIS — Z89.419 ACQUIRED ABSENCE OF UNSPECIFIED GREAT TOE: ICD-10-CM
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
## 2025-06-10 DIAGNOSIS — E11.40 TYPE 2 DIABETES MELLITUS WITH DIABETIC NEUROPATHY, UNSPECIFIED: ICD-10-CM

## 2025-06-10 DIAGNOSIS — L97.519 NON-PRESSURE CHRONIC ULCER OF OTHER PART OF RIGHT FOOT WITH UNSPECIFIED SEVERITY: ICD-10-CM

## 2025-06-12 ENCOUNTER — APPOINTMENT (OUTPATIENT)
Dept: PODIATRY | Facility: CLINIC | Age: 75
End: 2025-06-12
Payer: MEDICARE

## 2025-06-12 ENCOUNTER — OUTPATIENT (OUTPATIENT)
Dept: OUTPATIENT SERVICES | Facility: HOSPITAL | Age: 75
LOS: 1 days | End: 2025-06-12
Payer: MEDICARE

## 2025-06-12 DIAGNOSIS — Z89.421 ACQUIRED ABSENCE OF OTHER RIGHT TOE(S): Chronic | ICD-10-CM

## 2025-06-12 DIAGNOSIS — Z89.429 ACQUIRED ABSENCE OF OTHER TOE(S), UNSPECIFIED SIDE: Chronic | ICD-10-CM

## 2025-06-12 DIAGNOSIS — Z00.00 ENCOUNTER FOR GENERAL ADULT MEDICAL EXAMINATION WITHOUT ABNORMAL FINDINGS: ICD-10-CM

## 2025-06-12 DIAGNOSIS — L97.519 NON-PRESSURE CHRONIC ULCER OF OTHER PART OF RIGHT FOOT WITH UNSPECIFIED SEVERITY: ICD-10-CM
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
## 2025-06-12 DIAGNOSIS — Z89.431 ACQUIRED ABSENCE OF RIGHT FOOT: ICD-10-CM

## 2025-06-12 DIAGNOSIS — E11.42 TYPE 2 DIABETES MELLITUS WITH DIABETIC POLYNEUROPATHY: ICD-10-CM

## 2025-06-12 PROCEDURE — 99213 OFFICE O/P EST LOW 20 MIN: CPT

## 2025-06-16 ENCOUNTER — OUTPATIENT (OUTPATIENT)
Dept: OUTPATIENT SERVICES | Facility: HOSPITAL | Age: 75
LOS: 1 days | End: 2025-06-16
Payer: MEDICARE

## 2025-06-16 ENCOUNTER — APPOINTMENT (OUTPATIENT)
Dept: PODIATRY | Facility: CLINIC | Age: 75
End: 2025-06-16
Payer: MEDICARE

## 2025-06-16 DIAGNOSIS — E11.621 TYPE 2 DIABETES MELLITUS WITH FOOT ULCER: ICD-10-CM

## 2025-06-16 DIAGNOSIS — Z00.00 ENCOUNTER FOR GENERAL ADULT MEDICAL EXAMINATION WITHOUT ABNORMAL FINDINGS: ICD-10-CM
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
## 2025-06-16 DIAGNOSIS — L97.519 NON-PRESSURE CHRONIC ULCER OF OTHER PART OF RIGHT FOOT WITH UNSPECIFIED SEVERITY: ICD-10-CM

## 2025-06-16 DIAGNOSIS — Z89.429 ACQUIRED ABSENCE OF OTHER TOE(S), UNSPECIFIED SIDE: Chronic | ICD-10-CM

## 2025-06-16 DIAGNOSIS — Z89.421 ACQUIRED ABSENCE OF OTHER RIGHT TOE(S): Chronic | ICD-10-CM
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
## 2025-06-16 PROCEDURE — 29580 STRAPPING UNNA BOOT: CPT | Mod: RT

## 2025-06-18 ENCOUNTER — OUTPATIENT (OUTPATIENT)
Dept: OUTPATIENT SERVICES | Facility: HOSPITAL | Age: 75
LOS: 1 days | End: 2025-06-18
Payer: MEDICARE

## 2025-06-18 ENCOUNTER — APPOINTMENT (OUTPATIENT)
Dept: ENDOCRINOLOGY | Facility: CLINIC | Age: 75
End: 2025-06-18

## 2025-06-18 VITALS
HEART RATE: 75 BPM | SYSTOLIC BLOOD PRESSURE: 139 MMHG | BODY MASS INDEX: 35.87 KG/M2 | DIASTOLIC BLOOD PRESSURE: 75 MMHG | WEIGHT: 190 LBS | HEIGHT: 61 IN

## 2025-06-18 DIAGNOSIS — Z89.421 ACQUIRED ABSENCE OF OTHER RIGHT TOE(S): Chronic | ICD-10-CM

## 2025-06-18 DIAGNOSIS — Z89.429 ACQUIRED ABSENCE OF OTHER TOE(S), UNSPECIFIED SIDE: Chronic | ICD-10-CM
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
## 2025-06-18 DIAGNOSIS — E78.00 PURE HYPERCHOLESTEROLEMIA, UNSPECIFIED: ICD-10-CM
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
## 2025-06-18 DIAGNOSIS — Z00.00 ENCOUNTER FOR GENERAL ADULT MEDICAL EXAMINATION WITHOUT ABNORMAL FINDINGS: ICD-10-CM

## 2025-06-18 DIAGNOSIS — E11.65 TYPE 2 DIABETES MELLITUS WITH HYPERGLYCEMIA: ICD-10-CM

## 2025-06-18 PROCEDURE — 99214 OFFICE O/P EST MOD 30 MIN: CPT

## 2025-06-20 NOTE — DISCHARGE NOTE PROVIDER - DISCHARGE SERVICE FOR PATIENT
154.94
on the discharge service for the patient. I have reviewed and made amendments to the documentation where necessary.

## 2025-06-27 ENCOUNTER — APPOINTMENT (OUTPATIENT)
Dept: PODIATRY | Facility: CLINIC | Age: 75
End: 2025-06-27

## 2025-06-30 ENCOUNTER — APPOINTMENT (OUTPATIENT)
Dept: PODIATRY | Facility: CLINIC | Age: 75
End: 2025-06-30
Payer: MEDICARE

## 2025-06-30 ENCOUNTER — OUTPATIENT (OUTPATIENT)
Dept: OUTPATIENT SERVICES | Facility: HOSPITAL | Age: 75
LOS: 1 days | End: 2025-06-30
Payer: MEDICARE

## 2025-06-30 DIAGNOSIS — Z89.429 ACQUIRED ABSENCE OF OTHER TOE(S), UNSPECIFIED SIDE: Chronic | ICD-10-CM

## 2025-06-30 DIAGNOSIS — Z00.00 ENCOUNTER FOR GENERAL ADULT MEDICAL EXAMINATION WITHOUT ABNORMAL FINDINGS: ICD-10-CM

## 2025-06-30 DIAGNOSIS — Z89.421 ACQUIRED ABSENCE OF OTHER RIGHT TOE(S): Chronic | ICD-10-CM

## 2025-06-30 PROCEDURE — 99212 OFFICE O/P EST SF 10 MIN: CPT

## 2025-07-08 ENCOUNTER — APPOINTMENT (OUTPATIENT)
Dept: PODIATRY | Facility: CLINIC | Age: 75
End: 2025-07-08
Payer: MEDICARE

## 2025-07-08 ENCOUNTER — OUTPATIENT (OUTPATIENT)
Dept: OUTPATIENT SERVICES | Facility: HOSPITAL | Age: 75
LOS: 1 days | End: 2025-07-08
Payer: MEDICARE
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
## 2025-07-08 DIAGNOSIS — L97.519 NON-PRESSURE CHRONIC ULCER OF OTHER PART OF RIGHT FOOT WITH UNSPECIFIED SEVERITY: ICD-10-CM

## 2025-07-08 DIAGNOSIS — Z00.00 ENCOUNTER FOR GENERAL ADULT MEDICAL EXAMINATION WITHOUT ABNORMAL FINDINGS: ICD-10-CM
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
## 2025-07-08 DIAGNOSIS — Z89.421 ACQUIRED ABSENCE OF OTHER RIGHT TOE(S): Chronic | ICD-10-CM

## 2025-07-08 DIAGNOSIS — Z89.429 ACQUIRED ABSENCE OF OTHER TOE(S), UNSPECIFIED SIDE: Chronic | ICD-10-CM

## 2025-07-08 PROCEDURE — 11042 DBRDMT SUBQ TIS 1ST 20SQCM/<: CPT | Mod: RT
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
## 2025-07-14 DIAGNOSIS — L97.519 NON-PRESSURE CHRONIC ULCER OF OTHER PART OF RIGHT FOOT WITH UNSPECIFIED SEVERITY: ICD-10-CM

## 2025-07-14 DIAGNOSIS — E11.40 TYPE 2 DIABETES MELLITUS WITH DIABETIC NEUROPATHY, UNSPECIFIED: ICD-10-CM

## 2025-07-14 DIAGNOSIS — Z89.419 ACQUIRED ABSENCE OF UNSPECIFIED GREAT TOE: ICD-10-CM
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
## 2025-07-15 ENCOUNTER — APPOINTMENT (OUTPATIENT)
Dept: VASCULAR SURGERY | Facility: CLINIC | Age: 75
End: 2025-07-15

## 2025-07-17 ENCOUNTER — OUTPATIENT (OUTPATIENT)
Dept: OUTPATIENT SERVICES | Facility: HOSPITAL | Age: 75
LOS: 1 days | End: 2025-07-17
Payer: MEDICARE

## 2025-07-17 ENCOUNTER — APPOINTMENT (OUTPATIENT)
Dept: PODIATRY | Facility: CLINIC | Age: 75
End: 2025-07-17
Payer: MEDICARE

## 2025-07-17 DIAGNOSIS — E11.40 TYPE 2 DIABETES MELLITUS WITH DIABETIC NEUROPATHY, UNSPECIFIED: ICD-10-CM
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
## 2025-07-17 DIAGNOSIS — Z89.429 ACQUIRED ABSENCE OF OTHER TOE(S), UNSPECIFIED SIDE: Chronic | ICD-10-CM

## 2025-07-17 DIAGNOSIS — L97.519 NON-PRESSURE CHRONIC ULCER OF OTHER PART OF RIGHT FOOT WITH UNSPECIFIED SEVERITY: ICD-10-CM

## 2025-07-17 DIAGNOSIS — Z00.00 ENCOUNTER FOR GENERAL ADULT MEDICAL EXAMINATION WITHOUT ABNORMAL FINDINGS: ICD-10-CM

## 2025-07-17 DIAGNOSIS — Z89.421 ACQUIRED ABSENCE OF OTHER RIGHT TOE(S): Chronic | ICD-10-CM
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
## 2025-07-17 PROCEDURE — 99213 OFFICE O/P EST LOW 20 MIN: CPT

## 2025-07-24 ENCOUNTER — OUTPATIENT (OUTPATIENT)
Dept: OUTPATIENT SERVICES | Facility: HOSPITAL | Age: 75
LOS: 1 days | End: 2025-07-24
Payer: MEDICARE

## 2025-07-24 ENCOUNTER — APPOINTMENT (OUTPATIENT)
Dept: PODIATRY | Facility: CLINIC | Age: 75
End: 2025-07-24
Payer: MEDICARE

## 2025-07-24 DIAGNOSIS — Z00.00 ENCOUNTER FOR GENERAL ADULT MEDICAL EXAMINATION WITHOUT ABNORMAL FINDINGS: ICD-10-CM

## 2025-07-24 DIAGNOSIS — Z89.429 ACQUIRED ABSENCE OF OTHER TOE(S), UNSPECIFIED SIDE: Chronic | ICD-10-CM

## 2025-07-24 DIAGNOSIS — Z89.421 ACQUIRED ABSENCE OF OTHER RIGHT TOE(S): Chronic | ICD-10-CM

## 2025-07-24 PROCEDURE — 29580 STRAPPING UNNA BOOT: CPT | Mod: 50

## 2025-07-24 PROCEDURE — 29580 STRAPPING UNNA BOOT: CPT | Mod: RT

## 2025-07-29 NOTE — H&P PST ADULT - PRO INTERPRETER NEED 2
Dr. Vee Wallace messaged regarding need for sign out. Dr. Wallace will place sign out when able.  
English

## 2025-07-31 ENCOUNTER — APPOINTMENT (OUTPATIENT)
Dept: PODIATRY | Facility: CLINIC | Age: 75
End: 2025-07-31

## 2025-07-31 ENCOUNTER — OUTPATIENT (OUTPATIENT)
Dept: OUTPATIENT SERVICES | Facility: HOSPITAL | Age: 75
LOS: 1 days | End: 2025-07-31

## 2025-07-31 DIAGNOSIS — Z00.00 ENCOUNTER FOR GENERAL ADULT MEDICAL EXAMINATION WITHOUT ABNORMAL FINDINGS: ICD-10-CM

## 2025-07-31 DIAGNOSIS — Z89.429 ACQUIRED ABSENCE OF OTHER TOE(S), UNSPECIFIED SIDE: Chronic | ICD-10-CM

## 2025-07-31 DIAGNOSIS — Z89.421 ACQUIRED ABSENCE OF OTHER RIGHT TOE(S): Chronic | ICD-10-CM

## 2025-07-31 PROCEDURE — 11042 DBRDMT SUBQ TIS 1ST 20SQCM/<: CPT

## 2025-08-05 ENCOUNTER — APPOINTMENT (OUTPATIENT)
Dept: VASCULAR SURGERY | Facility: CLINIC | Age: 75
End: 2025-08-05
Payer: MEDICARE

## 2025-08-05 VITALS
HEIGHT: 71 IN | WEIGHT: 190 LBS | DIASTOLIC BLOOD PRESSURE: 77 MMHG | SYSTOLIC BLOOD PRESSURE: 131 MMHG | BODY MASS INDEX: 26.6 KG/M2

## 2025-08-05 DIAGNOSIS — I70.213 ATHEROSCLEROSIS OF NATIVE ARTERIES OF EXTREMITIES WITH INTERMITTENT CLAUDICATION, BILATERAL LEGS: ICD-10-CM

## 2025-08-05 PROCEDURE — 93926 LOWER EXTREMITY STUDY: CPT | Mod: LT

## 2025-08-05 PROCEDURE — 99213 OFFICE O/P EST LOW 20 MIN: CPT

## 2025-08-07 ENCOUNTER — APPOINTMENT (OUTPATIENT)
Dept: PODIATRY | Facility: CLINIC | Age: 75
End: 2025-08-07
Payer: MEDICARE

## 2025-08-07 ENCOUNTER — OUTPATIENT (OUTPATIENT)
Dept: OUTPATIENT SERVICES | Facility: HOSPITAL | Age: 75
LOS: 1 days | End: 2025-08-07
Payer: MEDICARE

## 2025-08-07 DIAGNOSIS — L97.519 NON-PRESSURE CHRONIC ULCER OF OTHER PART OF RIGHT FOOT WITH UNSPECIFIED SEVERITY: ICD-10-CM

## 2025-08-07 DIAGNOSIS — E11.621 TYPE 2 DIABETES MELLITUS WITH FOOT ULCER: ICD-10-CM

## 2025-08-07 DIAGNOSIS — Z00.00 ENCOUNTER FOR GENERAL ADULT MEDICAL EXAMINATION WITHOUT ABNORMAL FINDINGS: ICD-10-CM

## 2025-08-07 DIAGNOSIS — L97.509 TYPE 2 DIABETES MELLITUS WITH FOOT ULCER: ICD-10-CM

## 2025-08-07 DIAGNOSIS — Z89.429 ACQUIRED ABSENCE OF OTHER TOE(S), UNSPECIFIED SIDE: Chronic | ICD-10-CM
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
## 2025-08-07 PROCEDURE — 29580 STRAPPING UNNA BOOT: CPT | Mod: RT

## 2025-08-19 DIAGNOSIS — L97.519 NON-PRESSURE CHRONIC ULCER OF OTHER PART OF RIGHT FOOT WITH UNSPECIFIED SEVERITY: ICD-10-CM
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
## 2025-08-19 DIAGNOSIS — E11.621 TYPE 2 DIABETES MELLITUS WITH FOOT ULCER: ICD-10-CM
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
## 2025-08-21 ENCOUNTER — APPOINTMENT (OUTPATIENT)
Dept: PODIATRY | Facility: CLINIC | Age: 75
End: 2025-08-21

## 2025-09-02 ENCOUNTER — APPOINTMENT (OUTPATIENT)
Dept: PODIATRY | Facility: CLINIC | Age: 75
End: 2025-09-02
Payer: MEDICARE

## 2025-09-02 ENCOUNTER — OUTPATIENT (OUTPATIENT)
Dept: OUTPATIENT SERVICES | Facility: HOSPITAL | Age: 75
LOS: 1 days | End: 2025-09-02
Payer: MEDICARE

## 2025-09-02 DIAGNOSIS — L97.519 NON-PRESSURE CHRONIC ULCER OF OTHER PART OF RIGHT FOOT WITH UNSPECIFIED SEVERITY: ICD-10-CM

## 2025-09-02 DIAGNOSIS — Z89.421 ACQUIRED ABSENCE OF OTHER RIGHT TOE(S): Chronic | ICD-10-CM
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
## 2025-09-02 DIAGNOSIS — Z00.00 ENCOUNTER FOR GENERAL ADULT MEDICAL EXAMINATION WITHOUT ABNORMAL FINDINGS: ICD-10-CM

## 2025-09-02 DIAGNOSIS — Z89.429 ACQUIRED ABSENCE OF OTHER TOE(S), UNSPECIFIED SIDE: Chronic | ICD-10-CM

## 2025-09-02 DIAGNOSIS — E11.621 TYPE 2 DIABETES MELLITUS WITH FOOT ULCER: ICD-10-CM

## 2025-09-02 PROCEDURE — 99213 OFFICE O/P EST LOW 20 MIN: CPT | Mod: 25

## 2025-09-02 PROCEDURE — 83036 HEMOGLOBIN GLYCOSYLATED A1C: CPT

## 2025-09-02 PROCEDURE — 11042 DBRDMT SUBQ TIS 1ST 20SQCM/<: CPT | Mod: RT

## 2025-09-03 LAB
ESTIMATED AVERAGE GLUCOSE: 197 MG/DL
HBA1C MFR BLD HPLC: 8.5 %

## 2025-09-09 ENCOUNTER — APPOINTMENT (OUTPATIENT)
Dept: PODIATRY | Facility: CLINIC | Age: 75
End: 2025-09-09
Payer: MEDICARE

## 2025-09-09 DIAGNOSIS — L97.519 NON-PRESSURE CHRONIC ULCER OF OTHER PART OF RIGHT FOOT WITH UNSPECIFIED SEVERITY: ICD-10-CM

## 2025-09-09 PROCEDURE — 11042 DBRDMT SUBQ TIS 1ST 20SQCM/<: CPT | Mod: RT

## 2025-09-16 ENCOUNTER — APPOINTMENT (OUTPATIENT)
Dept: PODIATRY | Facility: CLINIC | Age: 75
End: 2025-09-16
Payer: MEDICARE

## 2025-09-16 DIAGNOSIS — E11.621 TYPE 2 DIABETES MELLITUS WITH FOOT ULCER: ICD-10-CM

## 2025-09-16 DIAGNOSIS — L97.519 NON-PRESSURE CHRONIC ULCER OF OTHER PART OF RIGHT FOOT WITH UNSPECIFIED SEVERITY: ICD-10-CM

## 2025-09-16 DIAGNOSIS — L97.509 TYPE 2 DIABETES MELLITUS WITH FOOT ULCER: ICD-10-CM

## 2025-09-16 PROCEDURE — 11042 DBRDMT SUBQ TIS 1ST 20SQCM/<: CPT | Mod: RT
